# Patient Record
Sex: FEMALE | Race: WHITE | NOT HISPANIC OR LATINO | Employment: FULL TIME | URBAN - METROPOLITAN AREA
[De-identification: names, ages, dates, MRNs, and addresses within clinical notes are randomized per-mention and may not be internally consistent; named-entity substitution may affect disease eponyms.]

---

## 2017-05-11 ENCOUNTER — HOSPITAL ENCOUNTER (EMERGENCY)
Facility: HOSPITAL | Age: 62
Discharge: HOME | End: 2017-05-11
Payer: COMMERCIAL

## 2017-05-11 ENCOUNTER — APPOINTMENT (EMERGENCY)
Dept: RADIOLOGY | Facility: HOSPITAL | Age: 62
End: 2017-05-11
Payer: COMMERCIAL

## 2017-05-11 VITALS
DIASTOLIC BLOOD PRESSURE: 77 MMHG | SYSTOLIC BLOOD PRESSURE: 169 MMHG | BODY MASS INDEX: 44.9 KG/M2 | HEIGHT: 64 IN | OXYGEN SATURATION: 96 % | RESPIRATION RATE: 18 BRPM | TEMPERATURE: 98.1 F | HEART RATE: 65 BPM | WEIGHT: 263 LBS

## 2017-05-11 DIAGNOSIS — S37.009A KIDNEY INJURY: ICD-10-CM

## 2017-05-11 DIAGNOSIS — M54.9 MUSCULOSKELETAL BACK PAIN: Primary | ICD-10-CM

## 2017-05-11 LAB
ALBUMIN SERPL BCP-MCNC: 3.7 G/DL (ref 3.5–5)
ALP SERPL-CCNC: 151 U/L (ref 46–116)
ALT SERPL W P-5'-P-CCNC: 29 U/L (ref 12–78)
ANION GAP SERPL CALCULATED.3IONS-SCNC: 8 MMOL/L (ref 4–13)
ANION GAP SERPL CALCULATED.3IONS-SCNC: 8 MMOL/L (ref 4–13)
APTT PPP: 29 SECONDS (ref 23–35)
AST SERPL W P-5'-P-CCNC: 17 U/L (ref 5–45)
BASOPHILS # BLD AUTO: 0.03 THOUSANDS/ΜL (ref 0–0.1)
BASOPHILS NFR BLD AUTO: 0 % (ref 0–1)
BILIRUB SERPL-MCNC: 0.3 MG/DL (ref 0.2–1)
BUN SERPL-MCNC: 42 MG/DL (ref 5–25)
BUN SERPL-MCNC: 43 MG/DL (ref 5–25)
CALCIUM SERPL-MCNC: 8.7 MG/DL (ref 8.3–10.1)
CALCIUM SERPL-MCNC: 8.9 MG/DL (ref 8.3–10.1)
CHLORIDE SERPL-SCNC: 103 MMOL/L (ref 100–108)
CHLORIDE SERPL-SCNC: 107 MMOL/L (ref 100–108)
CO2 SERPL-SCNC: 25 MMOL/L (ref 21–32)
CO2 SERPL-SCNC: 27 MMOL/L (ref 21–32)
CREAT SERPL-MCNC: 1.49 MG/DL (ref 0.6–1.3)
CREAT SERPL-MCNC: 1.58 MG/DL (ref 0.6–1.3)
EOSINOPHIL # BLD AUTO: 0.17 THOUSAND/ΜL (ref 0–0.61)
EOSINOPHIL NFR BLD AUTO: 2 % (ref 0–6)
ERYTHROCYTE [DISTWIDTH] IN BLOOD BY AUTOMATED COUNT: 14 % (ref 11.6–15.1)
GFR SERPL CREATININE-BSD FRML MDRD: 33.2 ML/MIN/1.73SQ M
GFR SERPL CREATININE-BSD FRML MDRD: 35.6 ML/MIN/1.73SQ M
GLUCOSE SERPL-MCNC: 197 MG/DL (ref 65–140)
GLUCOSE SERPL-MCNC: 360 MG/DL (ref 65–140)
HCT VFR BLD AUTO: 35.9 % (ref 34.8–46.1)
HGB BLD-MCNC: 11.9 G/DL (ref 11.5–15.4)
LYMPHOCYTES # BLD AUTO: 1.95 THOUSANDS/ΜL (ref 0.6–4.47)
LYMPHOCYTES NFR BLD AUTO: 24 % (ref 14–44)
MCH RBC QN AUTO: 28.8 PG (ref 26.8–34.3)
MCHC RBC AUTO-ENTMCNC: 33.1 G/DL (ref 31.4–37.4)
MCV RBC AUTO: 87 FL (ref 82–98)
MONOCYTES # BLD AUTO: 0.56 THOUSAND/ΜL (ref 0.17–1.22)
MONOCYTES NFR BLD AUTO: 7 % (ref 4–12)
NEUTROPHILS # BLD AUTO: 5.53 THOUSANDS/ΜL (ref 1.85–7.62)
NEUTS SEG NFR BLD AUTO: 67 % (ref 43–75)
PLATELET # BLD AUTO: 262 THOUSANDS/UL (ref 149–390)
PMV BLD AUTO: 10.3 FL (ref 8.9–12.7)
POTASSIUM SERPL-SCNC: 5.1 MMOL/L (ref 3.5–5.3)
POTASSIUM SERPL-SCNC: 5.5 MMOL/L (ref 3.5–5.3)
PROT SERPL-MCNC: 8.1 G/DL (ref 6.4–8.2)
RBC # BLD AUTO: 4.13 MILLION/UL (ref 3.81–5.12)
SODIUM SERPL-SCNC: 138 MMOL/L (ref 136–145)
SODIUM SERPL-SCNC: 140 MMOL/L (ref 136–145)
TROPONIN I SERPL-MCNC: <0.02 NG/ML
TROPONIN I SERPL-MCNC: <0.02 NG/ML
WBC # BLD AUTO: 8.24 THOUSAND/UL (ref 4.31–10.16)

## 2017-05-11 PROCEDURE — 96374 THER/PROPH/DIAG INJ IV PUSH: CPT

## 2017-05-11 PROCEDURE — 99285 EMERGENCY DEPT VISIT HI MDM: CPT

## 2017-05-11 PROCEDURE — 84484 ASSAY OF TROPONIN QUANT: CPT | Performed by: PHYSICIAN ASSISTANT

## 2017-05-11 PROCEDURE — 80048 BASIC METABOLIC PNL TOTAL CA: CPT | Performed by: PHYSICIAN ASSISTANT

## 2017-05-11 PROCEDURE — 36415 COLL VENOUS BLD VENIPUNCTURE: CPT | Performed by: PHYSICIAN ASSISTANT

## 2017-05-11 PROCEDURE — 96361 HYDRATE IV INFUSION ADD-ON: CPT

## 2017-05-11 PROCEDURE — 80053 COMPREHEN METABOLIC PANEL: CPT | Performed by: PHYSICIAN ASSISTANT

## 2017-05-11 PROCEDURE — 93005 ELECTROCARDIOGRAM TRACING: CPT | Performed by: PHYSICIAN ASSISTANT

## 2017-05-11 PROCEDURE — 85025 COMPLETE CBC W/AUTO DIFF WBC: CPT | Performed by: PHYSICIAN ASSISTANT

## 2017-05-11 PROCEDURE — 85730 THROMBOPLASTIN TIME PARTIAL: CPT | Performed by: PHYSICIAN ASSISTANT

## 2017-05-11 PROCEDURE — 71020 HB CHEST X-RAY 2VW FRONTAL&LATL: CPT

## 2017-05-11 RX ORDER — TRAMADOL HYDROCHLORIDE 50 MG/1
50 TABLET ORAL EVERY 6 HOURS PRN
Qty: 20 TABLET | Refills: 0 | Status: SHIPPED | OUTPATIENT
Start: 2017-05-11 | End: 2017-05-16

## 2017-05-11 RX ORDER — CLOPIDOGREL BISULFATE 75 MG/1
75 TABLET ORAL DAILY
COMMUNITY

## 2017-05-11 RX ORDER — KETOROLAC TROMETHAMINE 30 MG/ML
30 INJECTION, SOLUTION INTRAMUSCULAR; INTRAVENOUS ONCE
Status: COMPLETED | OUTPATIENT
Start: 2017-05-11 | End: 2017-05-11

## 2017-05-11 RX ORDER — ASPIRIN 81 MG/1
81 TABLET ORAL DAILY
COMMUNITY

## 2017-05-11 RX ORDER — SPIRONOLACTONE 25 MG/1
12.5 TABLET ORAL DAILY
Status: ON HOLD | COMMUNITY
End: 2020-01-07 | Stop reason: ALTCHOICE

## 2017-05-11 RX ORDER — LISINOPRIL 20 MG/1
20 TABLET ORAL DAILY
Status: ON HOLD | COMMUNITY
End: 2020-01-07 | Stop reason: ALTCHOICE

## 2017-05-11 RX ORDER — ATORVASTATIN CALCIUM 40 MG/1
40 TABLET, FILM COATED ORAL DAILY
COMMUNITY

## 2017-05-11 RX ORDER — METOPROLOL SUCCINATE 25 MG/1
75 TABLET, EXTENDED RELEASE ORAL DAILY
Status: ON HOLD | COMMUNITY
End: 2020-01-07 | Stop reason: ALTCHOICE

## 2017-05-11 RX ADMIN — SODIUM CHLORIDE 500 ML: 0.9 INJECTION, SOLUTION INTRAVENOUS at 17:40

## 2017-05-11 RX ADMIN — KETOROLAC TROMETHAMINE 30 MG: 30 INJECTION, SOLUTION INTRAMUSCULAR at 17:40

## 2017-05-12 LAB
ATRIAL RATE: 72 BPM
P AXIS: 24 DEGREES
PR INTERVAL: 126 MS
QRS AXIS: -17 DEGREES
QRSD INTERVAL: 88 MS
QT INTERVAL: 396 MS
QTC INTERVAL: 433 MS
T WAVE AXIS: 86 DEGREES
VENTRICULAR RATE: 72 BPM

## 2017-08-28 ENCOUNTER — APPOINTMENT (EMERGENCY)
Dept: RADIOLOGY | Facility: HOSPITAL | Age: 62
End: 2017-08-28
Attending: EMERGENCY MEDICINE
Payer: COMMERCIAL

## 2017-08-28 ENCOUNTER — HOSPITAL ENCOUNTER (EMERGENCY)
Facility: HOSPITAL | Age: 62
Discharge: HOME | End: 2017-08-28
Attending: EMERGENCY MEDICINE | Admitting: EMERGENCY MEDICINE
Payer: COMMERCIAL

## 2017-08-28 VITALS
HEART RATE: 74 BPM | SYSTOLIC BLOOD PRESSURE: 198 MMHG | HEIGHT: 64 IN | BODY MASS INDEX: 42.68 KG/M2 | DIASTOLIC BLOOD PRESSURE: 93 MMHG | RESPIRATION RATE: 17 BRPM | OXYGEN SATURATION: 98 % | WEIGHT: 250 LBS | TEMPERATURE: 97.8 F

## 2017-08-28 DIAGNOSIS — S92.002A CALCANEUS FRACTURE, LEFT: Primary | ICD-10-CM

## 2017-08-28 PROCEDURE — 73610 X-RAY EXAM OF ANKLE: CPT

## 2017-08-28 PROCEDURE — 99283 EMERGENCY DEPT VISIT LOW MDM: CPT

## 2017-08-28 PROCEDURE — 73630 X-RAY EXAM OF FOOT: CPT

## 2017-08-28 RX ORDER — HYDROCODONE BITARTRATE AND ACETAMINOPHEN 5; 325 MG/1; MG/1
1 TABLET ORAL EVERY 6 HOURS PRN
Qty: 15 TABLET | Refills: 0 | Status: SHIPPED | OUTPATIENT
Start: 2017-08-28 | End: 2017-09-07

## 2017-08-28 RX ORDER — INSULIN GLARGINE 100 [IU]/ML
36 INJECTION, SOLUTION SUBCUTANEOUS
COMMUNITY
End: 2020-02-21 | Stop reason: ALTCHOICE

## 2017-08-31 ENCOUNTER — ALLSCRIPTS OFFICE VISIT (OUTPATIENT)
Dept: OTHER | Facility: OTHER | Age: 62
End: 2017-08-31

## 2017-09-11 DIAGNOSIS — S92.002A CLOSED FRACTURE OF LEFT CALCANEUS: ICD-10-CM

## 2017-09-14 ENCOUNTER — GENERIC CONVERSION - ENCOUNTER (OUTPATIENT)
Dept: OTHER | Facility: OTHER | Age: 62
End: 2017-09-14

## 2017-09-14 ENCOUNTER — APPOINTMENT (OUTPATIENT)
Dept: RADIOLOGY | Facility: CLINIC | Age: 62
End: 2017-09-14
Payer: COMMERCIAL

## 2017-09-14 DIAGNOSIS — S92.002A CLOSED FRACTURE OF LEFT CALCANEUS: ICD-10-CM

## 2017-09-14 PROCEDURE — 73650 X-RAY EXAM OF HEEL: CPT

## 2017-10-16 DIAGNOSIS — S92.002D: ICD-10-CM

## 2017-10-17 ENCOUNTER — APPOINTMENT (OUTPATIENT)
Dept: RADIOLOGY | Facility: CLINIC | Age: 62
End: 2017-10-17
Payer: COMMERCIAL

## 2017-10-17 ENCOUNTER — ALLSCRIPTS OFFICE VISIT (OUTPATIENT)
Dept: OTHER | Facility: OTHER | Age: 62
End: 2017-10-17

## 2017-10-17 DIAGNOSIS — S92.002D: ICD-10-CM

## 2017-10-17 PROCEDURE — 73650 X-RAY EXAM OF HEEL: CPT

## 2017-10-18 NOTE — PROGRESS NOTES
Assessment  Assessed    1  Closed nondisplaced fracture of left calcaneus with routine healing, unspecified portion   of calcaneus, subsequent encounter (V54 19) (S90 002D)    Plan  Closed nondisplaced fracture of left calcaneus with routine healing, unspecified portion  of calcaneus, subsequent encounter    · * XR HEEL / CALCANEUS 2+ VIEW LEFT; Status:Active - Retrospective By Protocol  Authorization; Requested for:[id number];    · Follow-up visit in 6 weeks Evaluation and Treatment  Follow-up  Status: Hold For -  Scheduling  Requested for: 17GAR0482   · Weight bearing as tolerated ; Status:Complete;   Done: 70VLU2778 08:54AM    Discussion/Summary    41-year-old female with a left calcaneus fracture  She will begin weightbearing as tolerated with the cam on  I will see her back in 6 weeks with x-rays of the left calcaneus  Chief Complaint  Broken heel      History of Present Illness  HPI: 41-year-old female here today for follow-up of her left heel  On August 28, 2017 she was walking along the flat driveway when she felt and heard a pop and had exquisite pain and was found to have a left calcaneus fracture  She's been nonweightbearing in a cam boot  The most part she's been fairly compliant with that  Her pains been well controlled  Review of Systems    Constitutional: No fever, no chills, feels well, no tiredness, no recent weight gain or loss  Eyes: No complaints of eyesight problems, no red eyes  ENT: no loss of hearing, no nosebleeds, no sore throat  Cardiovascular: No complaints of chest pain, no palpitations, no leg claudication or lower extremity edema  Respiratory: no compliants of shortness of breath, no wheezing, no cough  Gastrointestinal: no complaints of abdominal pain, no constipation, no nausea or diarrhea, no vomiting, no bloody stools  Genitourinary: no complaints of dysuria, no incontinence  Musculoskeletal: limb pain, but-- as noted in HPI     Integumentary: no complaints of skin rash or lesion, no itching or dry skin, no skin wounds  Neurological: no complaints of headache, no confusion, no numbness or tingling, no dizziness  Endocrine: No complaints of muscle weakness, no feelings of weakness, no frequent urination, no excessive thirst    Psychiatric: No suicidal thoughts, no anxiety, no feelings of depression  Active Problems  Problems    1  Closed nondisplaced fracture of left calcaneus with routine healing, unspecified portion   of calcaneus, subsequent encounter (V54 19) (S92 002D)   2  Diabetes (250 00) (E11 9)   3  Heart disorder (429 9) (I51 9)   4  High blood pressure (401 9) (I10)    Surgical History  Problems    · History of Cath Stent Placement   · History of  Section    Family History  Family History    · Family history of arthritis (V17 7) (Z82 61)    Social History  Problems    · Drinks coffee   · Never a smoker   · Patient ingests cola containing caffeine (V49 89) (Z78 9)   · Social drinker (V49 89) (Z78 9)   · Tea    Current Meds   1  Atorvastatin Calcium 40 MG Oral Tablet; Therapy: (Recorded:48Fya5359) to Recorded   2  Clarithromycin 500 MG Oral Tablet; Therapy: 18ZCK0133 to (Last Elijah Delia)  Requested for: 50Pzm5614 Ordered   3  Lisinopril 20 MG Oral Tablet; Therapy: 50NCC4574 to (Last [facility])  Requested for: 23TMA8109 Ordered   4  Metoprolol Succinate ER 50 MG Oral Tablet Extended Release 24 Hour; Therapy: (Recorded:72Jna6200) to Recorded   5  Plavix 75 MG Oral Tablet; Therapy: (Recorded:19Hyd0835) to Recorded   6  Promethazine-Codeine 6 25-10 MG/5ML Oral Syrup; Therapy: 18OPN0215 to (Last Rx:45Yvo6479)  Requested for: 61Afw0809 Ordered   7  Spironolactone 25 MG Oral Tablet; Therapy: (Recorded:14Xra1335) to Recorded    Allergies  Medication    1  morphine   2   Penicillins    Vitals  Signs   Recorded: 60ZUU5938 08:19AM   Heart Rate: 74  Systolic: 868  Diastolic: 90  Recorded: 31CDB5811 08:17AM   Height: 5 ft 4 in    Physical Exam  Left foot: Full range of motion, no tenderness to palpation, mild swelling      Results/Data  I personally reviewed the films/images/results in the office today  My interpretation follows  X-ray Review X-rays show no further displacement and interval healing at the fracture site        Signatures   Electronically signed by : Bishop Cabrera MD; Oct 17 2017  8:54AM EST                       (Author)

## 2017-11-28 ENCOUNTER — ALLSCRIPTS OFFICE VISIT (OUTPATIENT)
Dept: OTHER | Facility: OTHER | Age: 62
End: 2017-11-28

## 2017-11-28 ENCOUNTER — APPOINTMENT (OUTPATIENT)
Dept: RADIOLOGY | Facility: CLINIC | Age: 62
End: 2017-11-28
Payer: COMMERCIAL

## 2017-11-28 DIAGNOSIS — S92.002D: ICD-10-CM

## 2017-11-28 PROCEDURE — 73650 X-RAY EXAM OF HEEL: CPT

## 2017-11-29 NOTE — PROGRESS NOTES
Assessment  Assessed    1  History of Closed nondisplaced fracture of left calcaneus with routine healing, unspecified portion of calcaneus, subsequent encounter (V54 19) (S92 002D)    Plan  PMH: Closed nondisplaced fracture of left calcaneus with routine healing, unspecifiedportion of calcaneus, subsequent encounter    · * XR HEEL / CALCANEUS 2+ VIEW LEFT; Status:Active - Retrospective By ProtocolAuthorization; Requested for:28Nov2017;    · Follow-up PRN Evaluation and Treatment  Follow-up  Status: Complete  Done:28Nov2017   · We recommend that you move your ankle through its full range of motion each day  [place] exercise 10 times in a row, 3 times a day ; Status:Complete;   Done: 56WWK4487   · We recommend that you use tubing to strengthen the muscles that move your foot andankle out to the side  Do this exercise 10 times in a row, 3 times a day ; Status:Complete;  Done: 66CUF3322   · We recommend that you use tubing to strengthen the muscles that turn your ankle in  [place] exercise 10 times in a row, 3 times a day ; Status:Complete;   Done: 69GOH9451   · *1 - [facility]  * heel calcaneus fracture  Evaluate and treatwith strengthening, range of motion, and proprioceptive training  No restrictions  Weanfrom Cam boot as tolerated  Status: Complete  Done: 43XWD0716  Care Summary provided  : Yes    Discussion/Summary    61-year-old female with a left calcaneus fracture, now healed  She may wean from the Cam boot  I have strongly recommended physical therapy  She will follow up as needed  Chief Complaint  Broken heel      History of Present Illness  HPI: 61-year-old female here today for follow-up of her left heel  On August 28, 2017 she was walking along the flat driveway when she felt and heard a pop and had exquisite pain and was found to have a left calcaneus fracture  She's been weightbearing in a cam boot  She feels like she is doing very well  She has no pain        Review of Systems   Constitutional: No fever, no chills, feels well, no tiredness, no recent weight gain or loss  Eyes: No complaints of eyesight problems, no red eyes  ENT: no loss of hearing, no nosebleeds, no sore throat  Cardiovascular: No complaints of chest pain, no palpitations, no leg claudication or lower extremity edema  Respiratory: no compliants of shortness of breath, no wheezing, no cough  Gastrointestinal: no complaints of abdominal pain, no constipation, no nausea or diarrhea, no vomiting, no bloody stools  Genitourinary: no complaints of dysuria, no incontinence  Musculoskeletal: as noted in HPI  Integumentary: no complaints of skin rash or lesion, no itching or dry skin, no skin wounds  Neurological: no complaints of headache, no confusion, no numbness or tingling, no dizziness  Endocrine: No complaints of muscle weakness, no feelings of weakness, no frequent urination, no excessive thirst   Psychiatric: No suicidal thoughts, no anxiety, no feelings of depression  ROS reviewed  Active Problems  Problems    1  Diabetes (250 00) (E11 9)   2  Heart disorder (429 9) (I51 9)   3  High blood pressure (401 9) (I10)    Past Medical History  Problems    · History of Closed nondisplaced fracture of left calcaneus with routine healing,unspecified portion of calcaneus, subsequent encounter (V54 19) (S92 002D)    Surgical History  Problems    · History of Cath Stent Placement   · History of  Section    Family History  Family History    · Family history of arthritis (V17 7) (Z82 61)    Social History  Problems    · Drinks coffee   · Never a smoker   · Patient ingests cola containing caffeine (V49 89) (Z78 9)   · Social drinker (V49 89) (Z78 9)   · Tea    Current Meds   1  Atorvastatin Calcium 40 MG Oral Tablet; Therapy: (Recorded:01Qzo2322) to Recorded   2  Clarithromycin 500 MG Oral Tablet; Therapy: 09UFA4177 to (Last Sharyon Clause)  Requested for: 05Gsq8864 Ordered   3   Lisinopril 20 MG Oral Tablet; Therapy: 54ZUB4470 to (Last Christine Crabtree)  Requested for: 00HHZ4592 Ordered   4  Metoprolol Succinate ER 50 MG Oral Tablet Extended Release 24 Hour; Therapy: (Recorded:85Yns2516) to Recorded   5  Plavix 75 MG Oral Tablet; Therapy: (Recorded:78Shk0111) to Recorded   6  Promethazine-Codeine 6 25-10 MG/5ML Oral Syrup; Therapy: 33OJB9762 to (Last Rx:20Lhe9075)  Requested for: 96Qmk8234 Ordered   7  Spironolactone 25 MG Oral Tablet; Therapy: (Recorded:31Zxj9847) to Recorded    Allergies  Medication    1  morphine   2  Penicillins    Vitals  Signs   Recorded: 28Nov2017 08:31AM   Heart Rate: 66  Systolic: 007  Diastolic: 92  Height: 5 ft 4 in  Weight: 263 lb   BMI Calculated: 45 14  BSA Calculated: 2 2    Physical Exam    Right Foot: Appearance: no swelling  Tenderness: None  ROM: subtalar motion was restricted   Results/Data  I personally reviewed the films/images/results in the office today  My interpretation follows  X-ray Review X-rays show healing of the left calcaneus fracture with excellent alignment        Signatures   Electronically signed by : William West MD; Nov 28 2017  9:13AM EST                       (Author)

## 2018-01-13 VITALS
SYSTOLIC BLOOD PRESSURE: 177 MMHG | HEIGHT: 64 IN | DIASTOLIC BLOOD PRESSURE: 92 MMHG | HEART RATE: 66 BPM | WEIGHT: 263 LBS | BODY MASS INDEX: 44.9 KG/M2

## 2018-01-13 VITALS — HEIGHT: 64 IN | SYSTOLIC BLOOD PRESSURE: 149 MMHG | HEART RATE: 67 BPM | DIASTOLIC BLOOD PRESSURE: 81 MMHG

## 2018-01-15 VITALS — DIASTOLIC BLOOD PRESSURE: 90 MMHG | SYSTOLIC BLOOD PRESSURE: 158 MMHG | HEIGHT: 64 IN | HEART RATE: 74 BPM

## 2018-01-22 VITALS — HEIGHT: 64 IN

## 2018-01-22 VITALS — SYSTOLIC BLOOD PRESSURE: 150 MMHG | HEART RATE: 71 BPM | DIASTOLIC BLOOD PRESSURE: 80 MMHG

## 2020-01-07 ENCOUNTER — APPOINTMENT (OUTPATIENT)
Dept: CT IMAGING | Facility: HOSPITAL | Age: 65
End: 2020-01-07
Payer: COMMERCIAL

## 2020-01-07 ENCOUNTER — APPOINTMENT (EMERGENCY)
Dept: RADIOLOGY | Facility: HOSPITAL | Age: 65
End: 2020-01-07
Payer: COMMERCIAL

## 2020-01-07 ENCOUNTER — HOSPITAL ENCOUNTER (OUTPATIENT)
Facility: HOSPITAL | Age: 65
Setting detail: OBSERVATION
Discharge: HOME | End: 2020-01-08
Attending: EMERGENCY MEDICINE | Admitting: FAMILY MEDICINE
Payer: COMMERCIAL

## 2020-01-07 DIAGNOSIS — R55 SYNCOPE AND COLLAPSE: ICD-10-CM

## 2020-01-07 DIAGNOSIS — R55 SYNCOPE: Primary | ICD-10-CM

## 2020-01-07 DIAGNOSIS — E11.9 DIABETES MELLITUS (HCC): ICD-10-CM

## 2020-01-07 DIAGNOSIS — L03.119 CELLULITIS OF LOWER EXTREMITY, UNSPECIFIED LATERALITY: ICD-10-CM

## 2020-01-07 PROBLEM — I25.10 CORONARY ARTERY DISEASE: Status: ACTIVE | Noted: 2020-01-07

## 2020-01-07 PROBLEM — I50.9 CHF (CONGESTIVE HEART FAILURE) (HCC): Status: ACTIVE | Noted: 2020-01-07

## 2020-01-07 PROBLEM — I67.1 CEREBRAL ANEURYSM WITHOUT RUPTURE: Status: ACTIVE | Noted: 2019-09-27

## 2020-01-07 PROBLEM — N18.4 STAGE 4 CHRONIC KIDNEY DISEASE (HCC): Status: ACTIVE | Noted: 2018-08-13

## 2020-01-07 PROBLEM — I10 HYPERTENSION: Status: ACTIVE | Noted: 2020-01-07

## 2020-01-07 LAB
ALBUMIN SERPL BCP-MCNC: 3.2 G/DL (ref 3.5–5)
ALP SERPL-CCNC: 97 U/L (ref 46–116)
ALT SERPL W P-5'-P-CCNC: 19 U/L (ref 12–78)
ANION GAP SERPL CALCULATED.3IONS-SCNC: 12 MMOL/L (ref 4–13)
AST SERPL W P-5'-P-CCNC: 14 U/L (ref 5–45)
BASOPHILS # BLD AUTO: 0.04 THOUSANDS/ΜL (ref 0–0.1)
BASOPHILS NFR BLD AUTO: 1 % (ref 0–1)
BILIRUB SERPL-MCNC: 0.5 MG/DL (ref 0.2–1)
BUN SERPL-MCNC: 79 MG/DL (ref 5–25)
CALCIUM SERPL-MCNC: 8.3 MG/DL (ref 8.3–10.1)
CHLORIDE SERPL-SCNC: 111 MMOL/L (ref 100–108)
CLARITY, POC: CLEAR
CO2 SERPL-SCNC: 21 MMOL/L (ref 21–32)
COLOR, POC: CLEAR
CREAT SERPL-MCNC: 2.15 MG/DL (ref 0.6–1.3)
EOSINOPHIL # BLD AUTO: 0.75 THOUSAND/ΜL (ref 0–0.61)
EOSINOPHIL NFR BLD AUTO: 10 % (ref 0–6)
ERYTHROCYTE [DISTWIDTH] IN BLOOD BY AUTOMATED COUNT: 15.7 % (ref 11.6–15.1)
EXT BILIRUBIN, UA: NEGATIVE
EXT BLOOD URINE: NEGATIVE
EXT GLUCOSE, UA: NEGATIVE
EXT KETONES: NEGATIVE
EXT NITRITE, UA: NEGATIVE
EXT PH, UA: 6
EXT PROTEIN, UA: NEGATIVE
EXT SPECIFIC GRAVITY, UA: 1.01
EXT UROBILINOGEN: 0.2
GFR SERPL CREATININE-BSD FRML MDRD: 24 ML/MIN/1.73SQ M
GLUCOSE SERPL-MCNC: 166 MG/DL (ref 65–140)
GLUCOSE SERPL-MCNC: 214 MG/DL (ref 65–140)
HCT VFR BLD AUTO: 29.5 % (ref 34.8–46.1)
HGB BLD-MCNC: 9.1 G/DL (ref 11.5–15.4)
IMM GRANULOCYTES # BLD AUTO: 0.01 THOUSAND/UL (ref 0–0.2)
IMM GRANULOCYTES NFR BLD AUTO: 0 % (ref 0–2)
LYMPHOCYTES # BLD AUTO: 0.73 THOUSANDS/ΜL (ref 0.6–4.47)
LYMPHOCYTES NFR BLD AUTO: 10 % (ref 14–44)
MCH RBC QN AUTO: 28.2 PG (ref 26.8–34.3)
MCHC RBC AUTO-ENTMCNC: 30.8 G/DL (ref 31.4–37.4)
MCV RBC AUTO: 91 FL (ref 82–98)
MONOCYTES # BLD AUTO: 0.45 THOUSAND/ΜL (ref 0.17–1.22)
MONOCYTES NFR BLD AUTO: 6 % (ref 4–12)
NEUTROPHILS # BLD AUTO: 5.29 THOUSANDS/ΜL (ref 1.85–7.62)
NEUTS SEG NFR BLD AUTO: 73 % (ref 43–75)
NRBC BLD AUTO-RTO: 0 /100 WBCS
PLATELET # BLD AUTO: 184 THOUSANDS/UL (ref 149–390)
PMV BLD AUTO: 11.1 FL (ref 8.9–12.7)
POTASSIUM SERPL-SCNC: 4.8 MMOL/L (ref 3.5–5.3)
PROT SERPL-MCNC: 7 G/DL (ref 6.4–8.2)
RBC # BLD AUTO: 3.23 MILLION/UL (ref 3.81–5.12)
SODIUM SERPL-SCNC: 144 MMOL/L (ref 136–145)
TROPONIN I SERPL-MCNC: 0.02 NG/ML
WBC # BLD AUTO: 7.27 THOUSAND/UL (ref 4.31–10.16)
WBC # BLD EST: NEGATIVE 10*3/UL

## 2020-01-07 PROCEDURE — 85025 COMPLETE CBC W/AUTO DIFF WBC: CPT | Performed by: EMERGENCY MEDICINE

## 2020-01-07 PROCEDURE — 70450 CT HEAD/BRAIN W/O DYE: CPT

## 2020-01-07 PROCEDURE — 81002 URINALYSIS NONAUTO W/O SCOPE: CPT | Performed by: EMERGENCY MEDICINE

## 2020-01-07 PROCEDURE — 99285 EMERGENCY DEPT VISIT HI MDM: CPT | Performed by: EMERGENCY MEDICINE

## 2020-01-07 PROCEDURE — 84484 ASSAY OF TROPONIN QUANT: CPT | Performed by: EMERGENCY MEDICINE

## 2020-01-07 PROCEDURE — 83036 HEMOGLOBIN GLYCOSYLATED A1C: CPT | Performed by: FAMILY MEDICINE

## 2020-01-07 PROCEDURE — 36415 COLL VENOUS BLD VENIPUNCTURE: CPT

## 2020-01-07 PROCEDURE — 80053 COMPREHEN METABOLIC PANEL: CPT | Performed by: EMERGENCY MEDICINE

## 2020-01-07 PROCEDURE — 71046 X-RAY EXAM CHEST 2 VIEWS: CPT

## 2020-01-07 PROCEDURE — 82948 REAGENT STRIP/BLOOD GLUCOSE: CPT

## 2020-01-07 PROCEDURE — 99285 EMERGENCY DEPT VISIT HI MDM: CPT

## 2020-01-07 PROCEDURE — 93005 ELECTROCARDIOGRAM TRACING: CPT

## 2020-01-07 PROCEDURE — 99218 PR INITIAL OBSERVATION CARE/DAY 30 MINUTES: CPT | Performed by: FAMILY MEDICINE

## 2020-01-07 RX ORDER — SPIRONOLACTONE 25 MG/1
12.5 TABLET ORAL DAILY
Status: DISCONTINUED | OUTPATIENT
Start: 2020-01-08 | End: 2020-01-07

## 2020-01-07 RX ORDER — CLOPIDOGREL BISULFATE 75 MG/1
75 TABLET ORAL DAILY
Status: DISCONTINUED | OUTPATIENT
Start: 2020-01-08 | End: 2020-01-08 | Stop reason: HOSPADM

## 2020-01-07 RX ORDER — HYDRALAZINE HYDROCHLORIDE 25 MG/1
25 TABLET, FILM COATED ORAL 3 TIMES DAILY
Status: ON HOLD | COMMUNITY
End: 2021-03-15 | Stop reason: CLARIF

## 2020-01-07 RX ORDER — CARVEDILOL 12.5 MG/1
25 TABLET ORAL 2 TIMES DAILY WITH MEALS
Status: DISCONTINUED | OUTPATIENT
Start: 2020-01-07 | End: 2020-01-08 | Stop reason: HOSPADM

## 2020-01-07 RX ORDER — TORSEMIDE 20 MG/1
40 TABLET ORAL DAILY
Status: DISCONTINUED | OUTPATIENT
Start: 2020-01-08 | End: 2020-01-08 | Stop reason: HOSPADM

## 2020-01-07 RX ORDER — HYDRALAZINE HYDROCHLORIDE 25 MG/1
75 TABLET, FILM COATED ORAL 3 TIMES DAILY
Status: DISCONTINUED | OUTPATIENT
Start: 2020-01-07 | End: 2020-01-08 | Stop reason: HOSPADM

## 2020-01-07 RX ORDER — ATORVASTATIN CALCIUM 40 MG/1
40 TABLET, FILM COATED ORAL DAILY
Status: DISCONTINUED | OUTPATIENT
Start: 2020-01-07 | End: 2020-01-08 | Stop reason: HOSPADM

## 2020-01-07 RX ORDER — ASPIRIN 81 MG/1
81 TABLET ORAL DAILY
Status: DISCONTINUED | OUTPATIENT
Start: 2020-01-08 | End: 2020-01-08 | Stop reason: HOSPADM

## 2020-01-07 RX ORDER — CARVEDILOL 25 MG/1
25 TABLET ORAL 2 TIMES DAILY
COMMUNITY
Start: 2019-03-18 | End: 2021-04-05 | Stop reason: HOSPADM

## 2020-01-07 RX ORDER — CEPHALEXIN 250 MG/1
250 CAPSULE ORAL EVERY 12 HOURS SCHEDULED
Status: DISCONTINUED | OUTPATIENT
Start: 2020-01-07 | End: 2020-01-08 | Stop reason: HOSPADM

## 2020-01-07 RX ORDER — INSULIN GLARGINE 100 [IU]/ML
36 INJECTION, SOLUTION SUBCUTANEOUS
Status: DISCONTINUED | OUTPATIENT
Start: 2020-01-07 | End: 2020-01-08 | Stop reason: HOSPADM

## 2020-01-07 RX ORDER — AMLODIPINE BESYLATE 2.5 MG/1
2.5 TABLET ORAL DAILY
COMMUNITY
End: 2021-07-19 | Stop reason: ALTCHOICE

## 2020-01-07 RX ORDER — HYDRALAZINE HYDROCHLORIDE 25 MG/1
25 TABLET, FILM COATED ORAL 3 TIMES DAILY
Status: DISCONTINUED | OUTPATIENT
Start: 2020-01-07 | End: 2020-01-07

## 2020-01-07 RX ORDER — TORSEMIDE 20 MG/1
40 TABLET ORAL DAILY
COMMUNITY
End: 2021-04-05 | Stop reason: HOSPADM

## 2020-01-07 RX ORDER — HYDRALAZINE HYDROCHLORIDE 50 MG/1
75 TABLET, FILM COATED ORAL 2 TIMES DAILY
COMMUNITY
End: 2021-04-05 | Stop reason: HOSPADM

## 2020-01-07 RX ORDER — AMLODIPINE BESYLATE 2.5 MG/1
2.5 TABLET ORAL DAILY
Status: DISCONTINUED | OUTPATIENT
Start: 2020-01-08 | End: 2020-01-08 | Stop reason: HOSPADM

## 2020-01-07 RX ORDER — LISINOPRIL 20 MG/1
20 TABLET ORAL DAILY
Status: DISCONTINUED | OUTPATIENT
Start: 2020-01-08 | End: 2020-01-07

## 2020-01-07 RX ORDER — NITROGLYCERIN 0.4 MG/1
0.4 TABLET SUBLINGUAL
COMMUNITY
Start: 2018-11-05

## 2020-01-07 RX ORDER — ATORVASTATIN CALCIUM 40 MG/1
40 TABLET, FILM COATED ORAL DAILY
Status: DISCONTINUED | OUTPATIENT
Start: 2020-01-08 | End: 2020-01-07

## 2020-01-07 RX ADMIN — CEPHALEXIN 250 MG: 250 CAPSULE ORAL at 23:09

## 2020-01-07 RX ADMIN — CARVEDILOL 25 MG: 12.5 TABLET, FILM COATED ORAL at 18:30

## 2020-01-07 RX ADMIN — HYDRALAZINE HYDROCHLORIDE 75 MG: 25 TABLET ORAL at 23:09

## 2020-01-07 RX ADMIN — ATORVASTATIN CALCIUM 40 MG: 40 TABLET, FILM COATED ORAL at 18:29

## 2020-01-07 NOTE — ED PROVIDER NOTES
History  Chief Complaint   Patient presents with    Syncope     pt presents to ER stating she woke up to get ready for work, got out of bed and passed out  reports nausea and neck pain     64y F here for evaluation after syncopal episodes  Was up getting ready for work  Had some nausea earlier in the am but none immediately prior to the incident  Was walking to the bathroom and then suddenly 'woke up on the floor'  Denies any preceding symptoms of lh, cp/pressure, sob/dillard, nausea, abd pain/cramping, numbness  No hx of sudden syncope in the past   Does have a sig cardiac hx w/ known cad, chf      Denies recent illness  No f/c/s, no sig cough/congestion  Does note a poor appetite w/ some mild nausea earlier today, none now  No v/d, no changes in urination  No sig le pain or swelling  No recent changes in her meds and reports med compliance  History provided by:  Patient and relative  Syncope   Episode history:  Single  Most recent episode: Today  Timing:  Unable to specify  Progression:  Resolved  Chronicity:  New  Context: normal activity    Witnessed: no    Relieved by:  Nothing  Worsened by:  Nothing  Ineffective treatments:  None tried  Associated symptoms: no anxiety, no chest pain, no diaphoresis, no difficulty breathing, no dizziness, no fever, no focal weakness, no headaches, no malaise/fatigue, no palpitations, no recent fall, no recent injury, no recent surgery, no rectal bleeding, no shortness of breath, no vomiting and no weakness    Risk factors: coronary artery disease        Prior to Admission Medications   Prescriptions Last Dose Informant Patient Reported? Taking?    amLODIPine (NORVASC) 2 5 mg tablet 1/6/2020 at Unknown time  Yes Yes   Sig: Take 2 5 mg by mouth daily   aspirin (ECOTRIN LOW STRENGTH) 81 mg EC tablet 1/7/2020 at Unknown time Self Yes Yes   Sig: Take 81 mg by mouth daily   atorvastatin (LIPITOR) 40 mg tablet 1/6/2020 at Unknown time Self Yes Yes   Sig: Take 40 mg by mouth daily   carvedilol (COREG) 25 mg tablet 2020 at Unknown time  Yes Yes   Sig: Take 25 mg by mouth 2 (two) times a day    clopidogrel (PLAVIX) 75 mg tablet 2020 at Unknown time Self Yes Yes   Sig: Take 75 mg by mouth daily   hydrALAZINE (APRESOLINE) 25 mg tablet   Yes Yes   Sig: Take 25 mg by mouth 3 (three) times a day   hydrALAZINE (APRESOLINE) 50 mg tablet   Yes Yes   Sig: Take by mouth 3 (three) times a day   insulin aspart (NovoLOG) 100 units/mL injection 2020 at Unknown time  Yes Yes   Sig: Inject 14 Units under the skin 3 (three) times a day before meals   insulin glargine (LANTUS) 100 units/mL subcutaneous injection   Yes No   Sig: Inject 36 Units under the skin daily at bedtime    nitroglycerin (NITROSTAT) 0 4 mg SL tablet Not Taking at Unknown time  Yes No   Sig: Place 0 4 mg under the tongue   torsemide (DEMADEX) 20 mg tablet 2020 at Unknown time  Yes Yes   Sig: Take 40 mg by mouth daily      Facility-Administered Medications: None       Past Medical History:   Diagnosis Date    Cardiac disease     [facility]     Coronary artery disease     Diabetes mellitus [facility]     Hyperlipidemia     Hypertension     Renal disorder     TIA (transient ischemic attack)        Past Surgical History:   Procedure Laterality Date    CARDIAC SURGERY       SECTION      CORONARY ANGIOPLASTY WITH STENT PLACEMENT         History reviewed  No pertinent family history  I have reviewed and agree with the history as documented  Social History     Tobacco Use    Smoking status: Former Smoker     Years: 40 00    Smokeless tobacco: Never Used    Tobacco comment: quit    Substance Use Topics    Alcohol use: Never     Frequency: Never     Binge frequency: Never     Comment: socially    Drug use: No        Review of Systems   Constitutional: Negative for diaphoresis, fever and malaise/fatigue  Respiratory: Negative for shortness of breath      Cardiovascular: Positive for syncope  Negative for chest pain and palpitations  Gastrointestinal: Negative for vomiting  Neurological: Negative for dizziness, focal weakness, weakness and headaches  All other systems reviewed and are negative  Physical Exam  Physical Exam   Constitutional: She is oriented to person, place, and time  She appears well-developed  Morbidly obese w/ bm 44 27    HENT:   Nose: Nose normal    Mouth/Throat: Oropharynx is clear and moist    Eyes: Pupils are equal, round, and reactive to light  EOM are normal    Neck: Neck supple  No JVD present  Cardiovascular: Normal rate and regular rhythm  Murmur heard  Pulmonary/Chest: Effort normal and breath sounds normal    Abdominal: Soft  There is no tenderness  Musculoskeletal: She exhibits edema (trace)  She exhibits no deformity  Neurological: She is alert and oriented to person, place, and time  She has normal strength  No cranial nerve deficit or sensory deficit  Coordination normal  GCS eye subscore is 4  GCS verbal subscore is 5  GCS motor subscore is 6  Skin: Skin is warm  There is pallor  Psychiatric: She has a normal mood and affect  Nursing note and vitals reviewed        Vital Signs  ED Triage Vitals   Temperature Pulse Respirations Blood Pressure SpO2   01/07/20 1136 01/07/20 1136 01/07/20 1136 01/07/20 1137 01/07/20 1136   (!) 97 2 °F (36 2 °C) 66 19 166/68 96 %      Temp src Heart Rate Source Patient Position - Orthostatic VS BP Location FiO2 (%)   -- 01/07/20 1136 -- -- --    Monitor         Pain Score       01/07/20 1136       7           Vitals:    01/07/20 1538 01/07/20 2309 01/07/20 2314 01/08/20 0740   BP: 142/62 162/74  157/67   Pulse: 66  69 66         Visual Acuity      ED Medications  Medications   amLODIPine (NORVASC) tablet 2 5 mg (2 5 mg Oral Given 1/8/20 0939)   aspirin (ECOTRIN LOW STRENGTH) EC tablet 81 mg (81 mg Oral Given 1/8/20 0939)   clopidogrel (PLAVIX) tablet 75 mg (75 mg Oral Given 1/8/20 0939) torsemide (DEMADEX) tablet 40 mg (40 mg Oral Given 1/8/20 0939)   carvedilol (COREG) tablet 25 mg (25 mg Oral Given 1/8/20 0943)   cephalexin (KEFLEX) capsule 250 mg (250 mg Oral Given 1/8/20 0939)   insulin glargine (LANTUS) subcutaneous injection 36 Units 0 36 mL (36 Units Subcutaneous Refused 1/7/20 2309)   atorvastatin (LIPITOR) tablet 40 mg (40 mg Oral Given 1/8/20 0939)   hydrALAZINE (APRESOLINE) tablet 75 mg (75 mg Oral Given 1/8/20 0939)   insulin lispro (HumaLOG) 100 units/mL subcutaneous injection 14 Units (14 Units Subcutaneous Given 1/8/20 0940)       Diagnostic Studies  Results Reviewed     Procedure Component Value Units Date/Time    Hemoglobin A1C w/ EAG Estimation [[id number]]  (Abnormal) Collected:  01/07/20 1243    Lab Status:  Final result Specimen:  Blood from Arm, Right Updated:  01/08/20 0547     Hemoglobin A1C 7 4 %       mg/dl     POCT urinalysis dipstick [[id number]]  (Normal) Resulted:  01/07/20 1433    Lab Status:  Final result Specimen:  Urine Updated:  01/07/20 1435     Color, UA clear     Clarity, UA clear     Glucose, UA (Ref: Negative) negative     Bilirubin, UA (Ref: Negative) negative     Ketones, UA (Ref: Negative) negative     Spec Grav, UA (Ref:1 003-1 030) 1 015     Blood, UA (Ref: Negative) negative     pH, UA (Ref: 4 5-8 0) 6 0     Protein, UA (Ref: Negative) negative     Urobilinogen, UA (Ref: 0 2- 1 0) 0 2      Leukocytes, UA (Ref: Negative) negative     Nitrite, UA (Ref: Negative) negative    Troponin I [[id number]]  (Normal) Collected:  01/07/20 1243    Lab Status:  Final result Specimen:  Blood from Arm, Right Updated:  01/07/20 1312     Troponin I 0 02 ng/mL     Comprehensive metabolic panel [[id number]]  (Abnormal) Collected:  01/07/20 1243    Lab Status:  Final result Specimen:  Blood from Arm, Right Updated:  01/07/20 1309     Sodium 144 mmol/L      Potassium 4 8 mmol/L      Chloride 111 mmol/L      CO2 21 mmol/L      ANION GAP 12 mmol/L      BUN 79 mg/dL Creatinine 2 15 mg/dL      Glucose 214 mg/dL      Calcium 8 3 mg/dL      AST 14 U/L      ALT 19 U/L      Alkaline Phosphatase 97 U/L      Total Protein 7 0 g/dL      Albumin 3 2 g/dL      Total Bilirubin 0 50 mg/dL      eGFR 24 ml/min/1 73sq m     Narrative:       National Kidney Disease Foundation guidelines for Chronic Kidney Disease (CKD):     Stage 1 with normal or high GFR (GFR > 90 mL/min/1 73 square meters)    Stage 2 Mild CKD (GFR = 60-89 mL/min/1 73 square meters)    Stage 3A Moderate CKD (GFR = 45-59 mL/min/1 73 square meters)    Stage 3B Moderate CKD (GFR = 30-44 mL/min/1 73 square meters)    Stage 4 Severe CKD (GFR = 15-29 mL/min/1 73 square meters)    Stage 5 End Stage CKD (GFR <15 mL/min/1 73 square meters)  Note: GFR calculation is accurate only with a steady state creatinine    CBC and differential [[id number]]  (Abnormal) Collected:  01/07/20 1243    Lab Status:  Final result Specimen:  Blood from Arm, Right Updated:  01/07/20 1254     WBC 7 27 Thousand/uL      RBC 3 23 Million/uL      Hemoglobin 9 1 g/dL      Hematocrit 29 5 %      MCV 91 fL      MCH 28 2 pg      MCHC 30 8 g/dL      RDW 15 7 %      MPV 11 1 fL      Platelets 607 Thousands/uL      nRBC 0 /100 WBCs      Neutrophils Relative 73 %      Immat GRANS % 0 %      Lymphocytes Relative 10 %      Monocytes Relative 6 %      Eosinophils Relative 10 %      Basophils Relative 1 %      Neutrophils Absolute 5 29 Thousands/µL      Immature Grans Absolute 0 01 Thousand/uL      Lymphocytes Absolute 0 73 Thousands/µL      Monocytes Absolute 0 45 Thousand/µL      Eosinophils Absolute 0 75 Thousand/µL      Basophils Absolute 0 04 Thousands/µL                  CT head wo contrast   Final Result by Jag Gray MD (01/08 8170)      No acute intracranial abnormality  Chronic lacunar infarction right caudate head and right frontal periventricular white matter  Atherosclerotic vascular calcifications                    Workstation performed: [id number]         XR chest 2 views   Final Result by Lizette Fox MD (01/07 0956)      No acute cardiopulmonary disease  Workstation performed: [id number]                    Procedures  ECG 12 Lead Documentation Only  Date/Time: 1/7/2020 12:00 PM  Performed by: Boni Gates DO  Authorized by: Boni Gates DO     ECG reviewed by me, the ED Provider: yes    Patient location:  ED  Previous ECG:     Previous ECG:  Unavailable  Interpretation:     Interpretation: normal    Rate:     ECG rate:  65    ECG rate assessment: normal    Rhythm:     Rhythm: sinus rhythm    Ectopy:     Ectopy: none    QRS:     QRS axis:  Normal  ST segments:     ST segments:  Normal  T waves:     T waves: normal               ED Course  ED Course as of Jan 08 1240   Tue Jan 07, 2020   1428 Hemoglobin(!): 9 1   1428 Baseline per patient  Creatinine(!): 2 15                               MDM  Number of Diagnoses or Management Options  Syncope: new and requires workup  Diagnosis management comments: Sudden syncope w/ no prodrome - hx of cad/chf  Will ck ekg, labs, cxr and re-eval  Will likely require observation admission       Amount and/or Complexity of Data Reviewed  Clinical lab tests: ordered and reviewed  Tests in the radiology section of CPT®: ordered and reviewed  Tests in the medicine section of CPT®: ordered and reviewed  Decide to obtain previous medical records or to obtain history from someone other than the patient: yes  Obtain history from someone other than the patient: yes  Independent visualization of images, tracings, or specimens: yes    Risk of Complications, Morbidity, and/or Mortality  General comments: Medium to high risk on [place] [facility] Syncope risk calculators            Disposition  Final diagnoses:   Syncope     Time reflects when diagnosis was documented in both MDM as applicable and the Disposition within this note     Time User Action Codes Description Comment    1/7/2020 2:49 PM Kate Montelongo Add [R55] Syncope     1/7/2020  4:45 PM Carmina Krishna Add [R55] Syncope and collapse       ED Disposition     ED Disposition Condition Date/Time Comment    Admit Stable Tue Jan 7, 2020  2:49 PM Case was discussed with Dr Lavonne Benito and the patient's admission status was agreed to be Admission Status: observation status to the service of Dr Dr Lavonne Benito   Follow-up Information    None         Current Discharge Medication List      CONTINUE these medications which have NOT CHANGED    Details   amLODIPine (NORVASC) 2 5 mg tablet Take 2 5 mg by mouth daily      aspirin (ECOTRIN LOW STRENGTH) 81 mg EC tablet Take 81 mg by mouth daily      atorvastatin (LIPITOR) 40 mg tablet Take 40 mg by mouth daily      carvedilol (COREG) 25 mg tablet Take 25 mg by mouth 2 (two) times a day       clopidogrel (PLAVIX) 75 mg tablet Take 75 mg by mouth daily      !! hydrALAZINE (APRESOLINE) 25 mg tablet Take 25 mg by mouth 3 (three) times a day      !! hydrALAZINE (APRESOLINE) 50 mg tablet Take by mouth 3 (three) times a day      insulin aspart (NovoLOG) 100 units/mL injection Inject 14 Units under the skin 3 (three) times a day before meals      torsemide (DEMADEX) 20 mg tablet Take 40 mg by mouth daily      insulin glargine (LANTUS) 100 units/mL subcutaneous injection Inject 36 Units under the skin daily at bedtime       nitroglycerin (NITROSTAT) 0 4 mg SL tablet Place 0 4 mg under the tongue       !! - Potential duplicate medications found  Please discuss with provider  No discharge procedures on file      ED Provider  Electronically Signed by           Sergey Plascencia DO  01/08/20 2160

## 2020-01-07 NOTE — PLAN OF CARE
Problem: Prexisting or High Potential for Compromised Skin Integrity  Goal: Skin integrity is maintained or improved  Description  INTERVENTIONS:  - Identify patients at risk for skin breakdown  - Assess and monitor skin integrity  - Assess and monitor nutrition and hydration status  - Monitor labs   - Assess for incontinence   - Turn and reposition patient  - Assist with mobility/ambulation  - Relieve pressure over bony prominences  - Avoid friction and shearing  - Provide appropriate hygiene as needed including keeping skin clean and dry  - Evaluate need for skin moisturizer/barrier cream  - Collaborate with interdisciplinary team   - Patient/family teaching  - Consider wound care consult   Outcome: Progressing     Problem: RESPIRATORY - ADULT  Goal: Achieves optimal ventilation and oxygenation  Description  INTERVENTIONS:  - Assess for changes in respiratory status  - Assess for changes in mentation and behavior  - Position to facilitate oxygenation and minimize respiratory effort  - Oxygen administered by appropriate delivery if ordered  - Initiate smoking cessation education as indicated  - Encourage broncho-pulmonary hygiene including cough, deep breathe, Incentive Spirometry  - Assess the need for suctioning and aspirate as needed  - Assess and instruct to report SOB or any respiratory difficulty  - Respiratory Therapy support as indicated  Outcome: Progressing     Problem: HEMATOLOGIC - ADULT  Goal: Maintains hematologic stability  Description  INTERVENTIONS  - Assess for signs and symptoms of bleeding or hemorrhage  - Monitor labs  - Administer supportive blood products/factors as ordered and appropriate  Outcome: Progressing     Problem: PAIN - ADULT  Goal: Verbalizes/displays adequate comfort level or baseline comfort level  Description  Interventions:  - Encourage patient to monitor pain and request assistance  - Assess pain using appropriate pain scale  - Administer analgesics based on type and severity of pain and evaluate response  - Implement non-pharmacological measures as appropriate and evaluate response  - Consider cultural and social influences on pain and pain management  - Notify physician/advanced practitioner if interventions unsuccessful or patient reports new pain  Outcome: Progressing     Problem: SAFETY ADULT  Goal: Patient will remain free of falls  Description  INTERVENTIONS:  - Assess patient frequently for physical needs  -  Identify cognitive and physical deficits and behaviors that affect risk of falls    -  [place] fall precautions as indicated by assessment   - Educate patient/family on patient safety including physical limitations  - Instruct patient to call for assistance with activity based on assessment  - Modify environment to reduce risk of injury  - Consider OT/PT consult to assist with strengthening/mobility  Outcome: Progressing  Goal: Maintain or return to baseline ADL function  Description  INTERVENTIONS:  -  Assess patient's ability to carry out ADLs; assess patient's baseline for ADL function and identify physical deficits which impact ability to perform ADLs (bathing, care of mouth/teeth, toileting, grooming, dressing, etc )  - Assess/evaluate cause of self-care deficits   - Assess range of motion  - Assess patient's mobility; develop plan if impaired  - Assess patient's need for assistive devices and provide as appropriate  - Encourage maximum independence but intervene and supervise when necessary  - Involve family in performance of ADLs  - Assess for home care needs following discharge   - Consider OT consult to assist with ADL evaluation and planning for discharge  - Provide patient education as appropriate  Outcome: Progressing  Goal: Maintain or return mobility status to optimal level  Description  INTERVENTIONS:  - Assess patient's baseline mobility status (ambulation, transfers, stairs, etc )    - Identify cognitive and physical deficits and behaviors that affect mobility  - Identify mobility aids required to assist with transfers and/or ambulation (gait belt, sit-to-stand, lift, walker, cane, etc )  - [place] fall precautions as indicated by assessment  - Record patient progress and toleration of activity level on Mobility SBAR; progress patient to next Phase/Stage  - Instruct patient to call for assistance with activity based on assessment  - Consider rehabilitation consult to assist with strengthening/weightbearing, etc   Outcome: Progressing     Problem: DISCHARGE PLANNING  Goal: Discharge to home or other facility with appropriate resources  Description  INTERVENTIONS:  - Identify barriers to discharge w/patient and caregiver  - Arrange for needed discharge resources and transportation as appropriate  - Identify discharge learning needs (meds, wound care, etc )  - Arrange for interpretive services to assist at discharge as needed  - Refer to Case Management Department for coordinating discharge planning if the patient needs post-hospital services based on physician/advanced practitioner order or complex needs related to functional status, cognitive ability, or social support system  Outcome: Progressing     Problem: Knowledge Deficit  Goal: Patient/family/caregiver demonstrates understanding of disease process, treatment plan, medications, and discharge instructions  Description  Complete learning assessment and assess knowledge base    Interventions:  - Provide teaching at level of understanding  - Provide teaching via preferred learning methods  Outcome: Progressing

## 2020-01-08 ENCOUNTER — APPOINTMENT (OUTPATIENT)
Dept: NON INVASIVE DIAGNOSTICS | Facility: HOSPITAL | Age: 65
End: 2020-01-08
Payer: COMMERCIAL

## 2020-01-08 VITALS
DIASTOLIC BLOOD PRESSURE: 67 MMHG | HEART RATE: 64 BPM | HEIGHT: 64 IN | BODY MASS INDEX: 44.18 KG/M2 | TEMPERATURE: 98.5 F | SYSTOLIC BLOOD PRESSURE: 157 MMHG | OXYGEN SATURATION: 94 % | WEIGHT: 258.8 LBS | RESPIRATION RATE: 16 BRPM

## 2020-01-08 LAB
ALBUMIN SERPL BCP-MCNC: 3.1 G/DL (ref 3.5–5)
ALP SERPL-CCNC: 93 U/L (ref 46–116)
ALT SERPL W P-5'-P-CCNC: 22 U/L (ref 12–78)
ANION GAP SERPL CALCULATED.3IONS-SCNC: 12 MMOL/L (ref 4–13)
AST SERPL W P-5'-P-CCNC: 15 U/L (ref 5–45)
ATRIAL RATE: 63 BPM
ATRIAL RATE: 65 BPM
BILIRUB SERPL-MCNC: 0.5 MG/DL (ref 0.2–1)
BUN SERPL-MCNC: 72 MG/DL (ref 5–25)
CALCIUM SERPL-MCNC: 8.5 MG/DL (ref 8.3–10.1)
CHLORIDE SERPL-SCNC: 115 MMOL/L (ref 100–108)
CO2 SERPL-SCNC: 20 MMOL/L (ref 21–32)
CREAT SERPL-MCNC: 2.06 MG/DL (ref 0.6–1.3)
ERYTHROCYTE [DISTWIDTH] IN BLOOD BY AUTOMATED COUNT: 15.8 % (ref 11.6–15.1)
EST. AVERAGE GLUCOSE BLD GHB EST-MCNC: 166 MG/DL
GFR SERPL CREATININE-BSD FRML MDRD: 25 ML/MIN/1.73SQ M
GLUCOSE SERPL-MCNC: 119 MG/DL (ref 65–140)
GLUCOSE SERPL-MCNC: 152 MG/DL (ref 65–140)
GLUCOSE SERPL-MCNC: 154 MG/DL (ref 65–140)
GLUCOSE SERPL-MCNC: 157 MG/DL (ref 65–140)
GLUCOSE SERPL-MCNC: 58 MG/DL (ref 65–140)
HBA1C MFR BLD: 7.4 % (ref 4.2–6.3)
HCT VFR BLD AUTO: 28.8 % (ref 34.8–46.1)
HGB BLD-MCNC: 8.8 G/DL (ref 11.5–15.4)
MCH RBC QN AUTO: 28.3 PG (ref 26.8–34.3)
MCHC RBC AUTO-ENTMCNC: 30.6 G/DL (ref 31.4–37.4)
MCV RBC AUTO: 93 FL (ref 82–98)
P AXIS: 72 DEGREES
P AXIS: 74 DEGREES
PLATELET # BLD AUTO: 174 THOUSANDS/UL (ref 149–390)
PMV BLD AUTO: 10.7 FL (ref 8.9–12.7)
POTASSIUM SERPL-SCNC: 4.9 MMOL/L (ref 3.5–5.3)
PR INTERVAL: 162 MS
PR INTERVAL: 162 MS
PROT SERPL-MCNC: 6.8 G/DL (ref 6.4–8.2)
QRS AXIS: -1 DEGREES
QRS AXIS: -2 DEGREES
QRSD INTERVAL: 84 MS
QRSD INTERVAL: 88 MS
QT INTERVAL: 458 MS
QT INTERVAL: 462 MS
QTC INTERVAL: 472 MS
QTC INTERVAL: 476 MS
RBC # BLD AUTO: 3.11 MILLION/UL (ref 3.81–5.12)
SODIUM SERPL-SCNC: 147 MMOL/L (ref 136–145)
T WAVE AXIS: 65 DEGREES
T WAVE AXIS: 73 DEGREES
VENTRICULAR RATE: 63 BPM
VENTRICULAR RATE: 65 BPM
WBC # BLD AUTO: 7.72 THOUSAND/UL (ref 4.31–10.16)

## 2020-01-08 PROCEDURE — 93010 ELECTROCARDIOGRAM REPORT: CPT | Performed by: INTERNAL MEDICINE

## 2020-01-08 PROCEDURE — 85027 COMPLETE CBC AUTOMATED: CPT | Performed by: FAMILY MEDICINE

## 2020-01-08 PROCEDURE — 99217 PR OBSERVATION CARE DISCHARGE MANAGEMENT: CPT | Performed by: FAMILY MEDICINE

## 2020-01-08 PROCEDURE — 93306 TTE W/DOPPLER COMPLETE: CPT | Performed by: INTERNAL MEDICINE

## 2020-01-08 PROCEDURE — 82948 REAGENT STRIP/BLOOD GLUCOSE: CPT

## 2020-01-08 PROCEDURE — 93306 TTE W/DOPPLER COMPLETE: CPT

## 2020-01-08 PROCEDURE — 80053 COMPREHEN METABOLIC PANEL: CPT | Performed by: FAMILY MEDICINE

## 2020-01-08 PROCEDURE — 99244 OFF/OP CNSLTJ NEW/EST MOD 40: CPT | Performed by: INTERNAL MEDICINE

## 2020-01-08 RX ORDER — CEPHALEXIN 250 MG/1
250 CAPSULE ORAL EVERY 12 HOURS SCHEDULED
Qty: 10 CAPSULE | Refills: 0 | Status: SHIPPED | OUTPATIENT
Start: 2020-01-08 | End: 2020-01-13

## 2020-01-08 RX ADMIN — INSULIN LISPRO 14 UNITS: 100 INJECTION, SOLUTION INTRAVENOUS; SUBCUTANEOUS at 09:40

## 2020-01-08 RX ADMIN — INSULIN LISPRO 14 UNITS: 100 INJECTION, SOLUTION INTRAVENOUS; SUBCUTANEOUS at 12:46

## 2020-01-08 RX ADMIN — HYDRALAZINE HYDROCHLORIDE 75 MG: 25 TABLET ORAL at 09:39

## 2020-01-08 RX ADMIN — CEPHALEXIN 250 MG: 250 CAPSULE ORAL at 09:39

## 2020-01-08 RX ADMIN — CLOPIDOGREL BISULFATE 75 MG: 75 TABLET ORAL at 09:39

## 2020-01-08 RX ADMIN — ASPIRIN 81 MG: 81 TABLET, COATED ORAL at 09:39

## 2020-01-08 RX ADMIN — ATORVASTATIN CALCIUM 40 MG: 40 TABLET, FILM COATED ORAL at 09:39

## 2020-01-08 RX ADMIN — CARVEDILOL 25 MG: 12.5 TABLET, FILM COATED ORAL at 09:43

## 2020-01-08 RX ADMIN — AMLODIPINE BESYLATE 2.5 MG: 2.5 TABLET ORAL at 09:39

## 2020-01-08 RX ADMIN — TORSEMIDE 40 MG: 20 TABLET ORAL at 09:39

## 2020-01-08 NOTE — PLAN OF CARE
Problem: Prexisting or High Potential for Compromised Skin Integrity  Goal: Skin integrity is maintained or improved  Description  INTERVENTIONS:  - Identify patients at risk for skin breakdown  - Assess and monitor skin integrity  - Assess and monitor nutrition and hydration status  - Monitor labs   - Assess for incontinence   - Turn and reposition patient  - Assist with mobility/ambulation  - Relieve pressure over bony prominences  - Avoid friction and shearing  - Provide appropriate hygiene as needed including keeping skin clean and dry  - Evaluate need for skin moisturizer/barrier cream  - Collaborate with interdisciplinary team   - Patient/family teaching  - Consider wound care consult   Outcome: Progressing     Problem: RESPIRATORY - ADULT  Goal: Achieves optimal ventilation and oxygenation  Description  INTERVENTIONS:  - Assess for changes in respiratory status  - Assess for changes in mentation and behavior  - Position to facilitate oxygenation and minimize respiratory effort  - Oxygen administered by appropriate delivery if ordered  - Initiate smoking cessation education as indicated  - Encourage broncho-pulmonary hygiene including cough, deep breathe, Incentive Spirometry  - Assess the need for suctioning and aspirate as needed  - Assess and instruct to report SOB or any respiratory difficulty  - Respiratory Therapy support as indicated  Outcome: Progressing     Problem: HEMATOLOGIC - ADULT  Goal: Maintains hematologic stability  Description  INTERVENTIONS  - Assess for signs and symptoms of bleeding or hemorrhage  - Monitor labs  - Administer supportive blood products/factors as ordered and appropriate  Outcome: Progressing     Problem: PAIN - ADULT  Goal: Verbalizes/displays adequate comfort level or baseline comfort level  Description  Interventions:  - Encourage patient to monitor pain and request assistance  - Assess pain using appropriate pain scale  - Administer analgesics based on type and severity of pain and evaluate response  - Implement non-pharmacological measures as appropriate and evaluate response  - Consider cultural and social influences on pain and pain management  - Notify physician/advanced practitioner if interventions unsuccessful or patient reports new pain  Outcome: Progressing     Problem: SAFETY ADULT  Goal: Patient will remain free of falls  Description  INTERVENTIONS:  - Assess patient frequently for physical needs  -  Identify cognitive and physical deficits and behaviors that affect risk of falls    -  [place] fall precautions as indicated by assessment   - Educate patient/family on patient safety including physical limitations  - Instruct patient to call for assistance with activity based on assessment  - Modify environment to reduce risk of injury  - Consider OT/PT consult to assist with strengthening/mobility  Outcome: Progressing  Goal: Maintain or return to baseline ADL function  Description  INTERVENTIONS:  -  Assess patient's ability to carry out ADLs; assess patient's baseline for ADL function and identify physical deficits which impact ability to perform ADLs (bathing, care of mouth/teeth, toileting, grooming, dressing, etc )  - Assess/evaluate cause of self-care deficits   - Assess range of motion  - Assess patient's mobility; develop plan if impaired  - Assess patient's need for assistive devices and provide as appropriate  - Encourage maximum independence but intervene and supervise when necessary  - Involve family in performance of ADLs  - Assess for home care needs following discharge   - Consider OT consult to assist with ADL evaluation and planning for discharge  - Provide patient education as appropriate  Outcome: Progressing  Goal: Maintain or return mobility status to optimal level  Description  INTERVENTIONS:  - Assess patient's baseline mobility status (ambulation, transfers, stairs, etc )    - Identify cognitive and physical deficits and behaviors that affect mobility  - Identify mobility aids required to assist with transfers and/or ambulation (gait belt, sit-to-stand, lift, walker, cane, etc )  - [place] fall precautions as indicated by assessment  - Record patient progress and toleration of activity level on Mobility SBAR; progress patient to next Phase/Stage  - Instruct patient to call for assistance with activity based on assessment  - Consider rehabilitation consult to assist with strengthening/weightbearing, etc   Outcome: Progressing     Problem: DISCHARGE PLANNING  Goal: Discharge to home or other facility with appropriate resources  Description  INTERVENTIONS:  - Identify barriers to discharge w/patient and caregiver  - Arrange for needed discharge resources and transportation as appropriate  - Identify discharge learning needs (meds, wound care, etc )  - Arrange for interpretive services to assist at discharge as needed  - Refer to Case Management Department for coordinating discharge planning if the patient needs post-hospital services based on physician/advanced practitioner order or complex needs related to functional status, cognitive ability, or social support system  Outcome: Progressing     Problem: Knowledge Deficit  Goal: Patient/family/caregiver demonstrates understanding of disease process, treatment plan, medications, and discharge instructions  Description  Complete learning assessment and assess knowledge base  Interventions:  - Provide teaching at level of understanding  - Provide teaching via preferred learning methods  Outcome: Progressing     Problem: Potential for Falls  Goal: Patient will remain free of falls  Description  INTERVENTIONS:  - Assess patient frequently for physical needs  -  Identify cognitive and physical deficits and behaviors that affect risk of falls    -  [place] fall precautions as indicated by assessment   - Educate patient/family on patient safety including physical limitations  - Instruct patient to call for assistance with activity based on assessment  - Modify environment to reduce risk of injury  - Consider OT/PT consult to assist with strengthening/mobility  Outcome: Progressing

## 2020-01-08 NOTE — ASSESSMENT & PLAN NOTE
Pt states that she was found to have a "stable aneursym" in her neck at [facility] in the Fall  Was told simply to monitor  To return in 6 months for follow-up  Pt is very afraid of IV contrast due to her kidney disease  CT head was done without contrast showed asthersclerotic disease of the internal carotid  Do not believe this was related to patient's presentation- syncope most likely 2/2 arrythmia  Pt should follow-up with vascular as scheduled at [facility]

## 2020-01-08 NOTE — ASSESSMENT & PLAN NOTE
Pt states that she was found to have a "stable aneursym" in her neck at [facility] in the Fall  Was told simply to monitor  To return in 6 months for follow-up  Pt is very afraid of IV contrast due to her kidney disease  Will consider further imaging, workup as needed  Cards consulted  Will consider vascular consult if needed

## 2020-01-08 NOTE — UTILIZATION REVIEW
Initial Clinical Review    Admission: Date/Time/Statement:  1/7/2020 1449 Observation   Orders Placed This Encounter   Procedures    Place in Observation     Standing Status:   Standing     Number of Occurrences:   1     Order Specific Question:   Admitting Physician     Answer:   Meño Pennington [[id number]]     Order Specific Question:   Level of Care     Answer:   Med Surg [[id number]]     ED Arrival Information     Expected Arrival Acuity Means of Arrival Escorted By Service Admission Type    - 1/7/2020 11:23 Urgent Walk-In Family Member General Medicine Urgent    Arrival Complaint    syncope, dizzy, SOB        Chief Complaint   Patient presents with    Syncope     pt presents to ER stating she woke up to get ready for work, got out of bed and passed out  reports nausea and neck pain     Assessment/Plan: this is a 62year old female from home to ED admitted as observation due to syncope R/O arrhthymias  Presented post syncope, ws in kitchen preparing lunch before work walked to bathroom and went down  Does not remember event, awoke on floor  Since Friday treated with antibiotics for LLE cellulitis  Pt has extensive cardiac hx, with MI s/p 3 stents  approx 5 years ago  Pt also denies having hx of any arrythmia in the past, but previous LVN problem list mentions afib not on medication  Pt also mentioned that she had been found to have a "stable aneursym" in her neck  On exam has minor area on cellulitis bilateral LE   EKG showed sinus  Bun 709,  Creatinine 2 15 with baseline of 2 5   Glucose 214 with history of diabetes  Telemetry, cardiology consult and echo in progress       ED Triage Vitals   Temperature Pulse Respirations Blood Pressure SpO2   01/07/20 1136 01/07/20 1136 01/07/20 1136 01/07/20 1137 01/07/20 1136   (!) 97 2 °F (36 2 °C) 66 19 166/68 96 %      Temp src Heart Rate Source Patient Position - Orthostatic VS BP Location FiO2 (%)   -- 01/07/20 1136 -- -- --    Monitor         Pain Score       01/07/20 1136 7        Wt Readings from Last 1 Encounters:   01/07/20 117 kg (258 lb 12 8 oz)     Additional Vital Signs:   01/08/20 07:40:56  98 5 °F (36 9 °C)  66  16  157/67  97  89 %Abnormal      01/07/20 23:14:45  98 5 °F (36 9 °C)  69        92 %     01/07/20 2309        162/74         01/07/20 15:38:05  97 7 °F (36 5 °C)  66    142/62  89  94 %         Pertinent Labs/Diagnostic Test Results:  1/7/2020 EKG - sinus  Normal ST and T       1/7/2020 CxR - No acute cardiopulmonary disease      1/8/2020 CT head - No acute intracranial abnormality     Chronic lacunar infarction right caudate head and right frontal periventricular white matter   Atherosclerotic vascular calcifications  Results from last 7 days   Lab Units 01/08/20  0511 01/07/20  1243   WBC Thousand/uL 7 72 7 27   HEMOGLOBIN g/dL 8 8* 9 1*   HEMATOCRIT % 28 8* 29 5*   PLATELETS Thousands/uL 174 184   NEUTROS ABS Thousands/µL  --  5 29     Results from last 7 days   Lab Units 01/08/20  0511 01/07/20  1243   SODIUM mmol/L 147* 144   POTASSIUM mmol/L 4 9 4 8   CHLORIDE mmol/L 115* 111*   CO2 mmol/L 20* 21   ANION GAP mmol/L 12 12   BUN mg/dL 72* 79*   CREATININE mg/dL 2 06* 2 15*   EGFR ml/min/1 73sq m 25 24   CALCIUM mg/dL 8 5 8 3     Results from last 7 days   Lab Units 01/08/20  0511 01/07/20  1243   AST U/L 15 14   ALT U/L 22 19   ALK PHOS U/L 93 97   TOTAL PROTEIN g/dL 6 8 7 0   ALBUMIN g/dL 3 1* 3 2*   TOTAL BILIRUBIN mg/dL 0 50 0 50     Results from last 7 days   Lab Units 01/08/20  0740 01/07/20  2204   POC GLUCOSE mg/dl 154* 166*     Results from last 7 days   Lab Units 01/08/20  0511 01/07/20  1243   GLUCOSE RANDOM mg/dL 157* 214*     Results from last 7 days   Lab Units 01/07/20  1243   HEMOGLOBIN A1C % 7 4*   EAG mg/dl 166     Results from last 7 days   Lab Units 01/07/20  1243   TROPONIN I ng/mL 0 02     Results from last 7 days   Lab Units 01/07/20  1433   CLARITY UA  clear   COLOR UA  clear   SPEC GRAV US  1 015   PH UA  6 0   GLUCOSE UA  negative   KETONES UA  negative   BLOOD UA  negative   PROTEIN UA  negative   NITRITE UA  negative   BILIRUBIN, UA  negative   UROBILINOGEN UA  0 2   LEUKOCYTES UA  negative       ED Treatment:   Medication Administration from 01/07/2020 1123 to 01/07/2020 1531     None        Past Medical History:   Diagnosis Date    Cardiac disease     CHF (congestive heart failure) ([place] [address] )     Coronary artery disease     Diabetes mellitus ([place] [address] )     Hyperlipidemia     Hypertension     Renal disorder     TIA (transient ischemic attack)      Present on Admission:   Syncope and collapse   [facility]) [facility]   Hypertension   Stage 4 chronic kidney disease ([facility])   Obesity   Cerebral aneurysm without rupture   Coronary artery disease   Diabetes mellitus ([place] [address] )   Lower extremity cellulitis      Admitting Diagnosis: Syncope [R55]  Age/Sex: 62 y o  female  Admission Orders: 1/7/2020 1449 Observation   Scheduled Medications:  Medications:  amLODIPine 2 5 mg Oral Daily   aspirin 81 mg Oral Daily   atorvastatin 40 mg Oral Daily   carvedilol 25 mg Oral BID With Meals   cephalexin 250 mg Oral Q12H [facility] & intermediate   clopidogrel 75 mg Oral Daily   hydrALAZINE 75 mg Oral TID   insulin glargine 36 Units Subcutaneous HS   insulin lispro 14 Units Subcutaneous TID With Meals   torsemide 40 mg Oral Daily     Continuous IV Infusions:     PRN Meds:       IP CONSULT TO CARDIOLOGY    Network Utilization Review Department  [email or web address]  ATTENTION: Please call with any questions or concerns to [phone] and carefully listen to the prompts so that you are directed to the right person  All voicemails are confidential   Robert Bhatt all requests for admission clinical reviews, approved or denied determinations and any other requests to dedicated fax number below belonging to the campus where the patient is receiving treatment   List of dedicated fax numbers for the Facilities:  [facility] ADMISSION DENIALS (Administrative/Medical Necessity) [address] [facility] (Maternity/NICU/Pediatrics) [place] [phone]     [address] [phone]   [facility] [phone]   [facility] [phone]   [facility]  [phone]   [address] [facility], [address]   [address] [place] [address] And [address] Tia Becker [phone]

## 2020-01-08 NOTE — ASSESSMENT & PLAN NOTE
Wt Readings from Last 3 Encounters:   01/07/20 117 kg (258 lb 12 8 oz)   11/28/17 119 kg (263 lb)   08/28/17 113 kg (250 lb)     Pt's +1 b/l FAUSTINO improved overnight  Continue pt's home toresemide

## 2020-01-08 NOTE — ASSESSMENT & PLAN NOTE
No results found for: HGBA1C  Will add to AM blood draw  No results for input(s): POCGLU in the last 72 hours  Blood Sugar Average: Last 72 hrs:     Pt is on Toujeo 36 units at home  Novolog 14 units with meals  Continue lantus, humalog while inpt  Will add ISS if still uncontrolled  CC diet

## 2020-01-08 NOTE — ASSESSMENT & PLAN NOTE
Hx of uncontrolled HTN that has recently been controlled on amlodipine, coreg, hydralazine   Continue home doses on discharge

## 2020-01-08 NOTE — ASSESSMENT & PLAN NOTE
Hx of uncontrolled HTN that has recently been controlled on amlodipine, coreg, hydralazine    Will continue home doses on admission

## 2020-01-08 NOTE — ASSESSMENT & PLAN NOTE
Lab Results   Component Value Date    HGBA1C 7 4 (H) 01/07/2020        Recent Labs     01/07/20  2204 01/08/20  0740 01/08/20  1122   POCGLU 166* 154* 152*       Blood Sugar Average: Last 72 hrs:  (P) 085 [id number]   Upon admission, pt stated that she was on Toujeo 36 units q AM &  Novolog 14 units standing with meals  This dosing was continued inpatient and this evening pt experienced low blood sugar of 58 that quickly improved with eating to 119  Upon further discussion with pt, she stated that she incorrectly stated 14 units with meals but that she uses a sliding scale depending on what she's eating  This was corrected in the med rec  Pt should continue to closely monitor her blood sugars upon discharge and continue her previous home dosing

## 2020-01-08 NOTE — SOCIAL WORK
LOS: 21 hours  Pt is not a documented bundle  Pt is not a 30 day readmission  Met with Pt  Pt represents AA&Ox3  Discussed role of , discharge planning, identifying help at home and discharge preference  Pt reports she lives with her dtr(Ericka) in [place], has 1st floor setup, 3 nora  Pt reports she is independent with adls and uses cane to get in and out of house  Pt drives  Pt uses [facility] in [place]  Pt reports she has prescription plan and is able to afford medications  Pt reports she does not have living will or POA and is agreeable for information  Pt denies hx of SNF and VNA  Pt denies hx of mental health and drug and alcohol treatment  Pt reports her dtr will help her at home if needed  Pt reports her preference is to return home and does not anticipate discharge needs  Pt reports her dtr will transport her home  Will follow  Information given to Pt for POA and living will

## 2020-01-08 NOTE — H&P
H&P- Radha Pearce 1955, 62 y o  female MRN: [id number]    Unit/Bed#: -01 Encounter: [id number]    Primary Care Provider: Rosenda Hall DO   Date and time admitted to hospital: 1/7/2020 11:49 AM        * Syncope and collapse  Assessment & Plan  Possibly secondary to arrhythmia given pt's description: no prescynopal symptoms  Pt was making lunch in her kitchen and then was walking to her bedroom when all she remembers is waking up on the floor  Pt denies hx of arrythmia, afib in the past- but there is mention of it as a past problem in a LVN note  Admit to telemetry  CT head  Cards consult  Echo    Lower extremity cellulitis  Assessment & Plan  Pt presents with some minor localized cellulitis of b/l LE that pt has been being treated with Keflex and per pt has significantly improved  Will continue keflex     Diabetes mellitus ([facility] )  Assessment & Plan  No results found for: HGBA1C  Will add to AM blood draw  No results for input(s): POCGLU in the last 72 hours  Blood Sugar Average: Last 72 hrs:     Pt is on Toujeo 36 units at home  Novolog 14 units with meals  Continue lantus, humalog while inpt  Will add ISS if still uncontrolled  CC diet  Coronary artery disease  Assessment & Plan  Pt with hx of MI s/p 3 stents  Continue ASA, Plavix, statin    Hypertension  Assessment & Plan  Hx of uncontrolled HTN that has recently been controlled on amlodipine, coreg, hydralazine  Will continue home doses on admission    CHF (congestive heart failure) ([facility] )  Assessment & Plan  Wt Readings from Last 3 Encounters:   01/07/20 117 kg (258 lb 12 8 oz)   11/28/17 119 kg (263 lb)   08/28/17 113 kg (250 lb)     Pt's has+1 b/l FAUSTINO  Will continue pt's home toresemide  Will consider increasing as needed  Cerebral aneurysm without rupture  Assessment & Plan  Pt states that she was found to have a "stable aneursym" in her neck at [facility] in the Fall  Was told simply to monitor    To return in 6 months for follow-up  Pt is very afraid of IV contrast due to her kidney disease  Will consider further imaging, workup as needed  Cards consulted  Will consider vascular consult if needed  Obesity  Assessment & Plan  Pt states that her weight has not changed recently  BMI 44  Encourage lifestyle, diet changes        Stage 4 chronic kidney disease ([facility] )  Assessment & Plan  Pt states that her baseline Cr is approx 2 5  Pt's Cr  2 15 today  If pt is correct, CKD is stable  VTE Prophylaxis: Heparin  / reason for no mechanical VTE prophylaxis LE cellulitis   Code Status: full      Anticipated Length of Stay:  Patient will be admitted on an Observation basis with an anticipated length of stay of  Less than 2 midnights  Justification for Hospital Stay: telemetry, further syncopal workup    Total Time for Visit, including Counseling / Coordination of Care: 45 minutes  Greater than 50% of this total time spent on direct patient counseling and coordination of care  Chief Complaint:   Syncope and collapse    History of Present Illness:    Misty Piedra is a 62 y o  female who presents with episode of syncope this AM   Pt had been in her kitchen preparing lunch before work and then was walking to the bathroom on the same floor when she believes that she simply went down  Pt does not remember the event  Just remembers waking up on the floor  Called her daughter  Daughter came and then convinced pt to come to the hospital   Pt has no previous hx of syncope  Pt has extensive cardiac hx, with MI s/p 3 stents  approx 5 years ago  Pt also denies having hx of any arrythmia in the past, but previous LVN problem list mentions afib not on medication  Pt also mentioned that she had been found to have a "stable aneursym" in her neck  States that she was simply to follow-up in 6 months at [facility]        Pt is currently changing all of her medical care providers from [facility] and so has not been following up there     Pt states that she didn't feel completely "right" this AM   Vague feeling  Pt has been treating some mild lower extremity cellulitis with antibiotics since Friday  Review of Systems:    Review of Systems   Constitutional: Negative for activity change, appetite change, chills, diaphoresis, fatigue, fever and unexpected weight change  HENT: Negative for rhinorrhea and sinus pressure  Respiratory: Negative for apnea, cough, chest tightness and shortness of breath  Cardiovascular: Positive for leg swelling  Negative for chest pain  Gastrointestinal: Negative for abdominal pain, diarrhea and nausea  Musculoskeletal: Negative for gait problem and myalgias  Skin:        LE cellulitis   Neurological: Positive for syncope  Negative for dizziness, light-headedness and headaches  Past Medical and Surgical History:     Past Medical History:   Diagnosis Date    Cardiac disease     CHF [facility]congest[facility] failure) ([facility] )     Coronary artery disease     Diabetes mellitus ([place] [address] )     Hyperlipidemia     Hypertension     Renal disorder     TIA (transient ischemic attack)        Past Surgical History:   Procedure Laterality Date    CARDIAC SURGERY       SECTION      CORONARY ANGIOPLASTY WITH STENT PLACEMENT         Meds/Allergies:    Prior to Admission medications    Medication Sig Start Date End Date Taking?  Authorizing Provider   amLODIPine (NORVASC) 2 5 mg tablet Take 2 5 mg by mouth daily   Yes Historical Provider, MD   aspirin (ECOTRIN LOW STRENGTH) 81 mg EC tablet Take 81 mg by mouth daily   Yes Historical Provider, MD   atorvastatin (LIPITOR) 40 mg tablet Take 40 mg by mouth daily   Yes Historical Provider, MD   carvedilol (COREG) 25 mg tablet Take 25 mg by mouth 2 (two) times a day  3/18/19 3/17/20 Yes Historical Provider, MD   clopidogrel (PLAVIX) 75 mg tablet Take 75 mg by mouth daily   Yes Historical Provider, MD   hydrALAZINE (APRESOLINE) 25 mg tablet Take 25 mg by mouth 3 (three) times a day   Yes Historical Provider, MD   hydrALAZINE (APRESOLINE) 50 mg tablet Take by mouth 3 (three) times a day   Yes Historical Provider, MD   insulin aspart (NovoLOG) 100 units/mL injection Inject 14 Units under the skin 3 (three) times a day before meals   Yes Historical Provider, MD   torsemide (DEMADEX) 20 mg tablet Take 40 mg by mouth daily   Yes Historical Provider, MD   insulin glargine (LANTUS) 100 units/mL subcutaneous injection Inject 36 Units under the skin daily at bedtime     Historical Provider, MD   nitroglycerin (NITROSTAT) 0 4 mg SL tablet Place 0 4 mg under the tongue 11/5/18   Historical Provider, MD   lisinopril (ZESTRIL) 20 mg tablet Take 20 mg by mouth daily  1/7/20  Historical Provider, MD   metoprolol succinate (TOPROL-XL) 25 mg 24 hr tablet Take 75 mg by mouth daily  1/7/20  Historical Provider, MD   spironolactone (ALDACTONE) 25 mg tablet Take 12 5 mg by mouth daily    1/7/20  Historical Provider, MD     I have reviewed home medications with patient personally  Allergies: Allergies   Allergen Reactions    Morphine     Penicillins        Social History:     Marital Status:     Occupation: unknown  Patient Pre-hospital Living Situation: independently at home  Patient Pre-hospital Level of Mobility: ambulates without assistance  Substance Use History:   Social History     Substance and Sexual Activity   Alcohol Use Never    Frequency: Never    Binge frequency: Never    Comment: socially     Social History     Tobacco Use   Smoking Status Former Smoker    Years: 40 00   Smokeless Tobacco Never Used   Tobacco Comment    quit 1980     Social History     Substance and Sexual Activity   Drug Use No       Family History:    non-contributory    Physical Exam:     Vitals:   Blood Pressure: 142/62 (01/07/20 1538)  Pulse: 66 (01/07/20 1538)  Temperature: 97 7 °F (36 5 °C) (01/07/20 1538)  Respirations: 18 (01/07/20 1430)  Height: 5' 4" (162 6 cm) (01/07/20 1748)  Weight - Scale: 117 kg (258 lb 12 8 oz) (01/07/20 1748)  SpO2: 94 % (01/07/20 1538)    Physical Exam   Constitutional: She is oriented to person, place, and time  obese   HENT:   Head: Normocephalic and atraumatic  Eyes: Pupils are equal, round, and reactive to light  Conjunctivae and EOM are normal    Cardiovascular: Normal rate, regular rhythm and normal heart sounds  Pulmonary/Chest: Effort normal and breath sounds normal  No respiratory distress  Abdominal: Soft  Musculoskeletal: She exhibits edema  Neurological: She is alert and oriented to person, place, and time  Skin: There is erythema  Areas of minor cellulitis of b/l LE       Additional Data:     Lab Results: I have personally reviewed pertinent reports  Results from last 7 days   Lab Units 01/07/20  1243   WBC Thousand/uL 7 27   HEMOGLOBIN g/dL 9 1*   HEMATOCRIT % 29 5*   PLATELETS Thousands/uL 184   NEUTROS PCT % 73   LYMPHS PCT % 10*   MONOS PCT % 6   EOS PCT % 10*     Results from last 7 days   Lab Units 01/07/20  1243   SODIUM mmol/L 144   POTASSIUM mmol/L 4 8   CHLORIDE mmol/L 111*   CO2 mmol/L 21   BUN mg/dL 79*   CREATININE mg/dL 2 15*   ANION GAP mmol/L 12   CALCIUM mg/dL 8 3   ALBUMIN g/dL 3 2*   TOTAL BILIRUBIN mg/dL 0 50   ALK PHOS U/L 97   ALT U/L 19   AST U/L 14   GLUCOSE RANDOM mg/dL 214*                       Imaging: I have personally reviewed pertinent reports  XR chest 2 views   Final Result by Marquita Gutiérrez MD (01/07 1635)      No acute cardiopulmonary disease  Workstation performed: [id number]         CT head wo contrast    (Results Pending)       EKG, Pathology, and Other Studies Reviewed on Admission:   · EKG: NSR    Allscripts / Epic Records Reviewed: No     ** Please Note: This note has been constructed using a voice recognition system   **

## 2020-01-08 NOTE — ASSESSMENT & PLAN NOTE
Possibly secondary to arrhythmia given pt's description: no prescynopal symptoms  Pt was making lunch in her kitchen and then was walking to her bedroom when all she remembers is waking up on the floor  Pt denies hx of arrythmia, afib in the past- but there is mention of it as a past problem in a LVN note  Admitted to telemetry overnight  No events  CT head neg for acute intracranial abnormality  Showed old lacunar infarcts  Cards consulted and saw pt  Will see pt in office next week for loop recorder placement  Pt advised not to drive or work until she follows up with cards

## 2020-01-08 NOTE — DISCHARGE SUMMARY
Discharge- Christo Anacoco 1955, 62 y o  female MRN: [id number]    Unit/Bed#: -01 Encounter: [id number]    Primary Care Provider: Jose Ramon Romero DO   Date and time admitted to hospital: 1/7/2020 11:49 AM        * Syncope and collapse  Assessment & Plan  Possibly secondary to arrhythmia given pt's description: no prescynopal symptoms  Pt was making lunch in her kitchen and then was walking to her bedroom when all she remembers is waking up on the floor  Pt denies hx of arrythmia, afib in the past- but there is mention of it as a past problem in a LVN note  Admitted to telemetry overnight  No events  CT head neg for acute intracranial abnormality  Showed old lacunar infarcts  Cards consulted and saw pt  Will see pt in office next week for loop recorder placement  Pt advised not to drive or work until she follows up with cards  Lower extremity cellulitis  Assessment & Plan  Pt presents with some minor localized cellulitis of b/l LE that pt has been being treated with Keflex and per pt had significantly improved  With LE elevation, today appears even better- minimal swelling, minimal erythema  Will continue keflex x 5 days and recommend LE elevation on discharge  Diabetes mellitus [facility])  Assessment & Plan  Lab Results   Component Value Date    HGBA1C 7 4 (H) 01/07/2020        Recent Labs     01/07/20  2204 01/08/20  0740 01/08/20  1122   POCGLU 166* 154* 152*       Blood Sugar Average: Last 72 hrs:  (P) 829 [id number]   Upon admission, pt stated that she was on Toujeo 36 units q AM &  Novolog 14 units standing with meals  This dosing was continued inpatient and this evening pt experienced low blood sugar of 58 that quickly improved with eating to 119  Upon further discussion with pt, she stated that she incorrectly stated 14 units with meals but that she uses a sliding scale depending on what she's eating  This was corrected in the med rec      Pt should continue to closely monitor her blood sugars upon discharge and continue her previous home dosing  Coronary artery disease  Assessment & Plan  Pt with hx of MI s/p 3 stents  Continue ASA, Plavix, statin on discharge    Hypertension  Assessment & Plan  Hx of uncontrolled HTN that has recently been controlled on amlodipine, coreg, hydralazine  Continue home doses on discharge    CHF (congestive heart failure) ([facility] )  Assessment & Plan  Wt Readings from Last 3 Encounters:   01/07/20 117 kg (258 lb 12 8 oz)   11/28/17 119 kg (263 lb)   08/28/17 113 kg (250 lb)     Pt's +1 b/l FAUSTINO improved overnight  Continue pt's home toresemide  Cerebral aneurysm without rupture  Assessment & Plan  Pt states that she was found to have a "stable aneursym" in her neck at [facility] in the Fall  Was told simply to monitor  To return in 6 months for follow-up  Pt is very afraid of IV contrast due to her kidney disease  CT head was done without contrast showed asthersclerotic disease of the internal carotid  Do not believe this was related to patient's presentation- syncope most likely 2/2 arrythmia  Pt should follow-up with vascular as scheduled at [facility]  Obesity  Assessment & Plan  Pt states that her weight has not changed recently  BMI 44  Encourage lifestyle, diet changes        Stage 4 chronic kidney disease ([facility] )  Assessment & Plan  Pt states that her baseline Cr is approx 2 5  Pt's Cr  2 06 today  If pt is correct, CKD is stable          Discharging Physician / Practitioner: Yuni Marino DO  PCP: Ananth Mo DO  Admission Date:   Admission Orders (From admission, onward)     Ordered        01/07/20 1449  Place in Observation  Once                   Discharge Date: 01/08/20    Resolved Problems  Date Reviewed: 1/7/2020    None          Consultations During Hospital Stay:  · Cardiology    Procedures Performed:   · Head CT    Significant Findings / Test Results:   · Head CT- no acute intracranial abnormality    Incidental Findings:   Chronic lacunar infarction right caudate head and right frontal periventricular white matter  Atherosclerotic vascular calcifications  Test Results Pending at Discharge (will require follow up):   · none     Outpatient Tests Requested:  · none    Complications:  none    Reason for Admission:  Syncope and collapse    Hospital Course:     Rajinder Stevenson is a 62 y o  female patient who originally presented to the hospital on 1/7/2020 due to episode of synocope  Pt had been in her kitchen preparing lunch before work and then was walking to the bathroom on the same floor when she believes that she simply went down  Pt does not remember the event  Just remembers waking up on the floor  Called her daughter  Daughter came and then convinced pt to come to the hospital   Pt had no previous hx of syncope  Pt had extensive cardiac hx s/p 3 stents, but denied any hx of arrthymia  Pt was admitted to observation with telemetry monitoring due to concern for possible arrrythmia  However, telemetry revealed no rhythm disturbances  Cardiology was consulted and recommended outpt follow-up for loop monitor placement, with driving and work restrictions for a week  Pt was admitted with improving lower extremity cellulitis treated with keflex that continued to improve while here in the hospital  Pt discharged home on keflex and instructed to elevated her feet  Upon admission, pt stated that she was on Toujeo 36 units q AM &  Novolog 14 units standing with meals  Pt stated that her BS was running high at home on admission  This dosing was continued inpatient and this evening pt experienced low blood sugar of 58 that quickly improved with eating to 119  Upon further discussion with pt, she stated that she incorrectly stated 14 units with meals but that she uses a sliding scale depending on what she's eating  This was corrected in the med rec      Pt should continue to closely monitor her blood sugars upon discharge and continue her previous home dosing  Please see above list of diagnoses and related plan for additional information  Condition at Discharge: good     Discharge Day Visit / Exam:     Subjective:  Pt seen and examined at bedside  Doing well  No acute events overnight per nurse and patient  Pt said that she was feeling better but still didn't feel great, most likely due to her LE celulitis- although it was continuing to improve  Vitals: Blood Pressure: 157/67 (01/08/20 0740)  Pulse: 64 (01/08/20 1700)  Temperature: 98 5 °F (36 9 °C) (01/08/20 0740)  Respirations: 16 (01/08/20 0740)  Height: 5' 4" (162 6 cm) (01/07/20 1748)  Weight - Scale: 117 kg (258 lb 12 8 oz) (01/07/20 1748)  SpO2: 94 % (01/08/20 1700)  Exam:   Physical Exam   Constitutional: She is oriented to person, place, and time  obese   HENT:   Head: Normocephalic and atraumatic  Eyes: Pupils are equal, round, and reactive to light  Conjunctivae and EOM are normal    Cardiovascular: Normal rate, regular rhythm and normal heart sounds  Pulmonary/Chest: Effort normal and breath sounds normal    Abdominal: Soft  Bowel sounds are normal    Neurological: She is alert and oriented to person, place, and time  Skin:   B/l +1 FAUSTINO & erythema improved with elevation and  Abx,     Psychiatric: She has a normal mood and affect  Her behavior is normal  Judgment normal        Discussion with Family: daughter    Discharge instructions/Information to patient and family:   See after visit summary for information provided to patient and family  Provisions for Follow-Up Care:  See after visit summary for information related to follow-up care and any pertinent home health orders  Disposition:     Home    For Discharges to [facility] SNF:   · Not Applicable to this Patient - Not Applicable to this Patient    Planned Readmission: NA     Discharge Statement:  I spent 30 minutes discharging the patient   This time was spent on the day of discharge  I had direct contact with the patient on the day of discharge  Greater than 50% of the total time was spent examining patient, answering all patient questions, arranging and discussing plan of care with patient as well as directly providing post-discharge instructions  Additional time then spent on discharge activities  Discharge Medications:  See after visit summary for reconciled discharge medications provided to patient and family        ** Please Note: This note has been constructed using a voice recognition system **

## 2020-01-08 NOTE — ASSESSMENT & PLAN NOTE
Pt presents with some minor localized cellulitis of b/l LE that pt has been being treated with Keflex and per pt had significantly improved  With LE elevation, today appears even better- minimal swelling, minimal erythema  Will continue keflex x 5 days and recommend LE elevation on discharge

## 2020-01-08 NOTE — ASSESSMENT & PLAN NOTE
Wt Readings from Last 3 Encounters:   01/07/20 117 kg (258 lb 12 8 oz)   11/28/17 119 kg (263 lb)   08/28/17 113 kg (250 lb)     Pt's has+1 b/l FAUSTINO  Will continue pt's home toresemide  Will consider increasing as needed

## 2020-01-08 NOTE — NURSING NOTE
Patient to be discharged to home in stable condition  Instructions given to patient  Daughter to take her home

## 2020-01-08 NOTE — ASSESSMENT & PLAN NOTE
Pt presents with some minor localized cellulitis of b/l LE that pt has been being treated with Keflex and per pt has significantly improved    Will continue keflex

## 2020-01-08 NOTE — CONSULTS
Consultation - Cardiology   Ирина Romo 62 y o  female MRN: [id number]  Unit/Bed#: -01 Encounter: [id number]    Assessment/Plan     Assessment:    Syncope    CAD    PAF      Plan: This is her first episode of syncope  There was no prodrome  EKG shows NSR  BP is stable  EF is normal without valvular disease  Recommend thirty day event monitor for further evaluation  CAD: no angina and no clear ischemia  Continue with usual medical therapy    PAF: She is in sinus rhythm  Not on anticoagulation due to cerebral aneurysm  She would like to followup with us and we will order event monitor  History of Present Illness   Physician Requesting Consult: Rodger Dan DO  Reason for Consult / Principal Problem: Syncope  HPI: Ирина Romo is a 62y o  year old female who presents with syncope    She has a history of CAD and history of RON to LAD and she does have history of residual D1 disease  She has a history of TIA and atrial fibrillation, however not on anticoagulation due to a cerebral aneurysm  She also has chronic diastolic CHF  SHe has no prior history of syncope  She was walking in her bedroom and passed out without warning  She otherwise has had no chest pain or dyspnea or palpitations  Inpatient consult to Cardiology  Consult performed by: Bebo Saleh MD  Consult ordered by: Rodger Dan DO          Review of Systems   Constitutional: Negative  HENT: Negative  Eyes: Negative  Respiratory: Negative  Cardiovascular: Negative  Gastrointestinal: Negative  Endocrine: Negative  Genitourinary: Negative  Musculoskeletal: Negative  Skin: Negative  Allergic/Immunologic: Negative  Neurological: Positive for syncope  Hematological: Negative  Psychiatric/Behavioral: Negative          Historical Information   Past Medical History:   Diagnosis Date    Cardiac disease     [facility]congestive [facility] failure) [facility] [address] [facility]     Coronary artery disease     Diabetes mellitus ([facility] [address] [facility]     Hyperlipidemia     Hypertension     Renal disorder     TIA (transient ischemic attack)      Past Surgical History:   Procedure Laterality Date    CARDIAC SURGERY       SECTION      CORONARY ANGIOPLASTY WITH STENT PLACEMENT       Social History     Substance and Sexual Activity   Alcohol Use Never    Frequency: Never    Binge frequency: Never    Comment: socially     Social History     Substance and Sexual Activity   Drug Use No     Social History     Tobacco Use   Smoking Status Former Smoker    Years: 40 00   Smokeless Tobacco Never Used   Tobacco Comment    quit      Family History: History reviewed  No pertinent family history  Meds/Allergies   current meds:   Current Facility-Administered Medications   Medication Dose Route Frequency    amLODIPine (NORVASC) tablet 2 5 mg  2 5 mg Oral Daily    aspirin (ECOTRIN LOW STRENGTH) EC tablet 81 mg  81 mg Oral Daily    atorvastatin (LIPITOR) tablet 40 mg  40 mg Oral Daily    carvedilol (COREG) tablet 25 mg  25 mg Oral BID With Meals    cephalexin (KEFLEX) capsule 250 mg  250 mg Oral Q12H [facility]    clopidogrel (PLAVIX) tablet 75 mg  75 mg Oral Daily    hydrALAZINE (APRESOLINE) tablet 75 mg  75 mg Oral TID    insulin glargine (LANTUS) subcutaneous injection 36 Units 0 36 mL  36 Units Subcutaneous HS    insulin lispro (HumaLOG) 100 units/mL subcutaneous injection 14 Units  14 Units Subcutaneous TID With Meals    torsemide (DEMADEX) tablet 40 mg  40 mg Oral Daily     Allergies   Allergen Reactions    Morphine     Penicillins        Objective   Vitals: Blood pressure 157/67, pulse 66, temperature 98 5 °F (36 9 °C), resp  rate 16, height 5' 4" (1 626 m), weight 117 kg (258 lb 12 8 oz), SpO2 (!) 89 %, not currently breastfeeding    Orthostatic Blood Pressures      Most Recent Value   Blood Pressure  157/67 filed at 2020 0740          No intake or output data in the 24 hours ending 20 0857    Invasive Devices     Peripheral Intravenous Line            Peripheral IV 01/07/20 Right Arm less than 1 day                Physical Exam   Constitutional: She is oriented to person, place, and time  No distress  HENT:   Mouth/Throat: No oropharyngeal exudate  Eyes: No scleral icterus  Neck: No JVD present  Cardiovascular: Normal rate and regular rhythm  Pulmonary/Chest: Effort normal and breath sounds normal  No stridor  No respiratory distress  She has no wheezes  Abdominal: Soft  Bowel sounds are normal  She exhibits no distension  There is no tenderness  There is no guarding  Musculoskeletal: She exhibits no edema  Neurological: She is alert and oriented to person, place, and time  Skin: Skin is warm and dry  She is not diaphoretic  Psychiatric: She has a normal mood and affect  Her behavior is normal        Lab Results:   I have personally reviewed pertinent lab results  CBC with diff:   Results from last 7 days   Lab Units 01/08/20  0511   WBC Thousand/uL 7 72   RBC Million/uL 3 11*   HEMOGLOBIN g/dL 8 8*   HEMATOCRIT % 28 8*   MCV fL 93   MCH pg 28 3   MCHC g/dL 30 6*   RDW % 15 8*   MPV fL 10 7   PLATELETS Thousands/uL 174     CMP:   Results from last 7 days   Lab Units 01/08/20  0511   SODIUM mmol/L 147*   POTASSIUM mmol/L 4 9   CHLORIDE mmol/L 115*   CO2 mmol/L 20*   BUN mg/dL 72*   CREATININE mg/dL 2 06*   CALCIUM mg/dL 8 5   AST U/L 15   ALT U/L 22   ALK PHOS U/L 93   EGFR ml/min/1 73sq m 25     Troponin:   0   Lab Value Date/Time    TROPONINI 0 02 01/07/2020 1243    TROPONINI <0 02 05/11/2017 1949    TROPONINI <0 02 05/11/2017 1616     BNP:   Results from last 7 days   Lab Units 01/08/20  0511   POTASSIUM mmol/L 4 9   CHLORIDE mmol/L 115*   CO2 mmol/L 20*   BUN mg/dL 72*   CREATININE mg/dL 2 06*   CALCIUM mg/dL 8 5   EGFR ml/min/1 73sq m 25     Coags:     TSH:     Magnesium:     Lipid Profile:     Imaging: I have personally reviewed pertinent films in PACS  EKG: NSR  Normal ECG         Code Status: Level 1 - Full Code  Advance Directive and Living Will:      Power of :    POLST:      Counseling / Coordination of Care  Total floor / unit time spent today 45 minutes  Greater than 50% of total time was spent with the patient and / or family counseling and / or coordination of care  A description of the counseling / coordination of care

## 2020-01-08 NOTE — ASSESSMENT & PLAN NOTE
Possibly secondary to arrhythmia given pt's description: no prescynopal symptoms  Pt was making lunch in her kitchen and then was walking to her bedroom when all she remembers is waking up on the floor    Pt denies hx of arrythmia, afib in the past- but there is mention of it as a past problem in a LVN note  Admit to telemetry  CT head  Cards consult  Echo

## 2020-01-13 ENCOUNTER — TELEPHONE (OUTPATIENT)
Dept: CARDIOLOGY CLINIC | Facility: CLINIC | Age: 65
End: 2020-01-13

## 2020-01-13 NOTE — TELEPHONE ENCOUNTER
----- Message from Arie Donnelly MD sent at 1/8/2020  3:47 PM EST -----  FU WITH ME and I ordered thirty day event monitor

## 2020-02-21 ENCOUNTER — OFFICE VISIT (OUTPATIENT)
Dept: CARDIOLOGY CLINIC | Facility: CLINIC | Age: 65
End: 2020-02-21
Payer: COMMERCIAL

## 2020-02-21 VITALS
DIASTOLIC BLOOD PRESSURE: 60 MMHG | HEART RATE: 68 BPM | WEIGHT: 258 LBS | BODY MASS INDEX: 44.05 KG/M2 | SYSTOLIC BLOOD PRESSURE: 136 MMHG | HEIGHT: 64 IN

## 2020-02-21 DIAGNOSIS — N18.4 STAGE 4 CHRONIC KIDNEY DISEASE (HCC): Primary | ICD-10-CM

## 2020-02-21 DIAGNOSIS — R55 SYNCOPE AND COLLAPSE: ICD-10-CM

## 2020-02-21 PROCEDURE — 99214 OFFICE O/P EST MOD 30 MIN: CPT | Performed by: INTERNAL MEDICINE

## 2020-02-21 NOTE — PROGRESS NOTES
Cardiology Follow Up    Baldo Glasgows  1955  [id number]  [address] [facility] [address] [phone]    1  Syncope and collapse  Ambulatory referral to Cardiology       Interval History: Followup for syncope  She has not had syncope since her hospitalization  She has a history of CAD and RON to LAD  She also has diastolic CHF  SHe is good about her sodium restriction  Problem List     Syncope and collapse    Stage 4 chronic kidney disease ([facility] [address] )    Overview Signed 1/7/2020  9:35 PM by Queenie Strickland DO     Last Assessment & Plan:   Sees Dr Seth Murillo         Obesity    Overview Signed 1/7/2020  9:35 PM by Queenie Strickland DO     Last Assessment & Plan:   bariatric medicine  Cerebral aneurysm without rupture    CHF (congestive heart failure) ([facility])    Wt Readings from Last 3 Encounters:   02/21/20 117 kg (258 lb)   01/07/20 117 kg (258 lb 12 8 oz)   11/28/17 119 kg (263 lb)                 Hypertension    Coronary artery disease    Diabetes mellitus ([facility] [address] )      Lab Results   Component Value Date    HGBA1C 7 4 (H) 01/07/2020         Lower extremity cellulitis        Past Medical History:   Diagnosis Date    Cardiac disease     CHF (congestive heart failure) ([facility] [address] )     Coronary artery disease     Diabetes mellitus ([facility] [address] [facility]     Hyperlipidemia     Hypertension     Renal disorder     TIA (transient ischemic attack)      Social History     Socioeconomic History    Marital status:       Spouse name: Not on file    Number of children: Not on file    Years of education: Not on file    Highest education level: Not on file   Occupational History    Not on file   Social Needs    Financial resource strain: Not on file    Food insecurity:     Worry: Not on file     Inability: Not on file    Transportation needs:     Medical: Not on file     Non-medical: Not on file Tobacco Use    Smoking status: Former Smoker     Years: 40 00    Smokeless tobacco: Never Used    Tobacco comment: quit    Substance and Sexual Activity    Alcohol use: Never     Frequency: Never     Binge frequency: Never     Comment: socially    Drug use: No    Sexual activity: Not on file   Lifestyle    Physical activity:     Days per week: Not on file     Minutes per session: Not on file    Stress: Not on file   Relationships    Social connections:     Talks on phone: Not on file     Gets together: Not on file     Attends Hindu service: Not on file     Active member of club or organization: Not on file     Attends meetings of clubs or organizations: Not on file     Relationship status: Not on file    Intimate partner violence:     Fear of current or ex partner: Not on file     Emotionally abused: Not on file     Physically abused: Not on file     Forced sexual activity: Not on file   Other Topics Concern    Not on file   Social History Narrative    Not on file      No family history on file    Past Surgical History:   Procedure Laterality Date    CARDIAC SURGERY       SECTION      CORONARY ANGIOPLASTY WITH STENT PLACEMENT         Current Outpatient Medications:     amLODIPine (NORVASC) 2 5 mg tablet, Take 2 5 mg by mouth daily, Disp: , Rfl:     aspirin (ECOTRIN LOW STRENGTH) 81 mg EC tablet, Take 81 mg by mouth daily, Disp: , Rfl:     atorvastatin (LIPITOR) 40 mg tablet, Take 40 mg by mouth daily, Disp: , Rfl:     carvedilol (COREG) 25 mg tablet, Take 25 mg by mouth 2 (two) times a day , Disp: , Rfl:     clopidogrel (PLAVIX) 75 mg tablet, Take 75 mg by mouth daily, Disp: , Rfl:     hydrALAZINE (APRESOLINE) 25 mg tablet, Take 25 mg by mouth 3 (three) times a day, Disp: , Rfl:     hydrALAZINE (APRESOLINE) 50 mg tablet, Take by mouth 3 (three) times a day, Disp: , Rfl:     insulin aspart (NovoLOG) 100 units/mL injection, Inject 14 Units under the skin 3 (three) times a day before meals Continue pt's home sliding scale 0-14 units with meals, Disp: , Rfl: 0    insulin glargine (Toujeo SoloStar) 300 units/mL CONCETRATED U-300 injection pen (1-unit dial), use as directed, Disp: , Rfl:     torsemide (DEMADEX) 20 mg tablet, Take 40 mg by mouth daily, Disp: , Rfl:     nitroglycerin (NITROSTAT) 0 4 mg SL tablet, Place 0 4 mg under the tongue, Disp: , Rfl:   Allergies   Allergen Reactions    Sodium Hypochlorite Other (See Comments)    Morphine     Penicillins        Labs:     Chemistry        Component Value Date/Time     03/10/2015 1503    K 4 9 01/08/2020 0511    K 4 3 03/10/2015 1503     (H) 01/08/2020 0511     03/10/2015 1503    CO2 20 (L) 01/08/2020 0511    CO2 27 03/10/2015 1503    BUN 72 (H) 01/08/2020 0511    BUN 22 03/10/2015 1503    CREATININE 2 06 (H) 01/08/2020 0511    CREATININE 0 65 03/10/2015 1503        Component Value Date/Time    CALCIUM 8 5 01/08/2020 0511    CALCIUM 8 8 03/10/2015 1503    ALKPHOS 93 01/08/2020 0511    ALKPHOS 120 (H) 03/10/2015 1503    AST 15 01/08/2020 0511    AST 13 03/10/2015 1503    ALT 22 01/08/2020 0511    ALT 28 03/10/2015 1503    BILITOT 0 50 03/10/2015 1503            No results found for: CHOL  No results found for: HDL  No results found for: LDLCALC  No results found for: TRIG  No results found for: CHOLHDL    Imaging: No results found  Review of Systems   Constitution: Negative  HENT: Negative  Eyes: Negative  Cardiovascular: Positive for dyspnea on exertion  Respiratory: Positive for shortness of breath  Endocrine: Negative  Hematologic/Lymphatic: Negative  Skin: Negative  Musculoskeletal: Negative  Gastrointestinal: Negative  Genitourinary: Negative  Neurological: Negative  Psychiatric/Behavioral: Negative  Allergic/Immunologic: Negative          Vitals:    02/21/20 0911   BP: 136/60   Pulse: 68           Physical Exam   Constitutional: She is oriented to person, place, and time  No distress  HENT:   Mouth/Throat: No oropharyngeal exudate  Eyes: No scleral icterus  Neck: No JVD present  Cardiovascular: Normal rate and regular rhythm  Exam reveals no friction rub  No murmur heard  Pulmonary/Chest: Effort normal and breath sounds normal  No stridor  No respiratory distress  She has no wheezes  Abdominal: Soft  Bowel sounds are normal  She exhibits no distension  There is no tenderness  There is no guarding  Musculoskeletal: She exhibits no edema  Neurological: She is alert and oriented to person, place, and time  Skin: Skin is warm and dry  She is not diaphoretic  Psychiatric: She has a normal mood and affect  Her behavior is normal        Discussion/Summary:    Chronic Diastolic CHF: Her weight is up 2 pounds  She will take an extra 20 mg torsemide this afternoon  Syncope: No recurrence  The event monitor did not go well  CAD: Continue with current medical therapy  She has no angina continue with current medical management  She has been on long term DAPT will need to look into this further  Check BMP and refer to nephrology for CKD  The patient was counseled regarding diagnostic results, instructions for management, risk factor reductions, impressions  total time of encounter was 25 minutes and 15 minutes was spent counseling

## 2020-05-12 ENCOUNTER — TELEPHONE (OUTPATIENT)
Dept: NEPHROLOGY | Facility: CLINIC | Age: 65
End: 2020-05-12

## 2020-05-12 ENCOUNTER — TRANSCRIBE ORDERS (OUTPATIENT)
Dept: ADMINISTRATIVE | Facility: HOSPITAL | Age: 65
End: 2020-05-12

## 2020-05-12 ENCOUNTER — APPOINTMENT (OUTPATIENT)
Dept: WOUND CARE | Facility: HOSPITAL | Age: 65
End: 2020-05-12
Payer: COMMERCIAL

## 2020-05-12 DIAGNOSIS — L97.929 IDIOPATHIC CHRONIC VENOUS HYPERTENSION OF BOTH LOWER EXTREMITIES WITH ULCER (HCC): Primary | ICD-10-CM

## 2020-05-12 DIAGNOSIS — I87.313 IDIOPATHIC CHRONIC VENOUS HYPERTENSION OF BOTH LOWER EXTREMITIES WITH ULCER (HCC): Primary | ICD-10-CM

## 2020-05-12 DIAGNOSIS — L97.919 IDIOPATHIC CHRONIC VENOUS HYPERTENSION OF BOTH LOWER EXTREMITIES WITH ULCER (HCC): Primary | ICD-10-CM

## 2020-05-12 PROCEDURE — 99213 OFFICE O/P EST LOW 20 MIN: CPT

## 2020-05-19 ENCOUNTER — OFFICE VISIT (OUTPATIENT)
Dept: WOUND CARE | Facility: HOSPITAL | Age: 65
End: 2020-05-19
Payer: COMMERCIAL

## 2020-05-19 PROCEDURE — 97597 DBRDMT OPN WND 1ST 20 CM/<: CPT

## 2020-06-02 ENCOUNTER — APPOINTMENT (OUTPATIENT)
Dept: WOUND CARE | Facility: HOSPITAL | Age: 65
End: 2020-06-02
Payer: COMMERCIAL

## 2020-06-02 PROCEDURE — 99213 OFFICE O/P EST LOW 20 MIN: CPT

## 2020-06-16 ENCOUNTER — APPOINTMENT (OUTPATIENT)
Dept: WOUND CARE | Facility: HOSPITAL | Age: 65
End: 2020-06-16
Payer: COMMERCIAL

## 2020-06-16 PROCEDURE — 99213 OFFICE O/P EST LOW 20 MIN: CPT

## 2020-06-30 ENCOUNTER — APPOINTMENT (OUTPATIENT)
Dept: WOUND CARE | Facility: HOSPITAL | Age: 65
End: 2020-06-30
Payer: COMMERCIAL

## 2020-06-30 PROCEDURE — 97597 DBRDMT OPN WND 1ST 20 CM/<: CPT

## 2020-06-30 PROCEDURE — 99213 OFFICE O/P EST LOW 20 MIN: CPT

## 2020-07-07 ENCOUNTER — APPOINTMENT (OUTPATIENT)
Dept: WOUND CARE | Facility: HOSPITAL | Age: 65
End: 2020-07-07
Payer: COMMERCIAL

## 2020-07-07 PROCEDURE — 11042 DBRDMT SUBQ TIS 1ST 20SQCM/<: CPT

## 2020-07-07 PROCEDURE — 97597 DBRDMT OPN WND 1ST 20 CM/<: CPT

## 2020-07-15 ENCOUNTER — APPOINTMENT (OUTPATIENT)
Dept: WOUND CARE | Facility: HOSPITAL | Age: 65
End: 2020-07-15
Payer: COMMERCIAL

## 2020-07-15 PROCEDURE — 99213 OFFICE O/P EST LOW 20 MIN: CPT

## 2020-07-23 ENCOUNTER — APPOINTMENT (OUTPATIENT)
Dept: WOUND CARE | Facility: HOSPITAL | Age: 65
End: 2020-07-23
Payer: COMMERCIAL

## 2020-07-23 PROCEDURE — 11042 DBRDMT SUBQ TIS 1ST 20SQCM/<: CPT

## 2020-07-30 ENCOUNTER — APPOINTMENT (OUTPATIENT)
Dept: WOUND CARE | Facility: HOSPITAL | Age: 65
End: 2020-07-30
Payer: COMMERCIAL

## 2020-07-30 PROCEDURE — 99213 OFFICE O/P EST LOW 20 MIN: CPT

## 2020-08-06 ENCOUNTER — APPOINTMENT (OUTPATIENT)
Dept: WOUND CARE | Facility: HOSPITAL | Age: 65
End: 2020-08-06
Payer: COMMERCIAL

## 2020-08-06 PROCEDURE — 97597 DBRDMT OPN WND 1ST 20 CM/<: CPT

## 2020-08-12 ENCOUNTER — OFFICE VISIT (OUTPATIENT)
Dept: WOUND CARE | Facility: HOSPITAL | Age: 65
End: 2020-08-12
Payer: COMMERCIAL

## 2020-08-12 VITALS
SYSTOLIC BLOOD PRESSURE: 138 MMHG | HEART RATE: 72 BPM | DIASTOLIC BLOOD PRESSURE: 86 MMHG | RESPIRATION RATE: 16 BRPM | TEMPERATURE: 97.9 F

## 2020-08-12 DIAGNOSIS — I87.312 IDIOPATHIC CHRONIC VENOUS HYPERTENSION OF LEFT LOWER EXTREMITY WITH ULCER (HCC): Primary | ICD-10-CM

## 2020-08-12 DIAGNOSIS — Z79.4 TYPE 2 DIABETES MELLITUS WITH CHRONIC KIDNEY DISEASE, WITH LONG-TERM CURRENT USE OF INSULIN, UNSPECIFIED CKD STAGE (HCC): ICD-10-CM

## 2020-08-12 DIAGNOSIS — E11.22 TYPE 2 DIABETES MELLITUS WITH CHRONIC KIDNEY DISEASE, WITH LONG-TERM CURRENT USE OF INSULIN, UNSPECIFIED CKD STAGE (HCC): ICD-10-CM

## 2020-08-12 DIAGNOSIS — L97.929 IDIOPATHIC CHRONIC VENOUS HYPERTENSION OF LEFT LOWER EXTREMITY WITH ULCER (HCC): Primary | ICD-10-CM

## 2020-08-12 PROCEDURE — 97597 DBRDMT OPN WND 1ST 20 CM/<: CPT | Performed by: FAMILY MEDICINE

## 2020-08-12 PROCEDURE — 99213 OFFICE O/P EST LOW 20 MIN: CPT | Performed by: FAMILY MEDICINE

## 2020-08-12 RX ORDER — LIDOCAINE HYDROCHLORIDE 40 MG/ML
5 SOLUTION TOPICAL ONCE
Status: COMPLETED | OUTPATIENT
Start: 2020-08-12 | End: 2020-08-12

## 2020-08-12 RX ADMIN — LIDOCAINE HYDROCHLORIDE 1 ML: 40 SOLUTION TOPICAL at 09:19

## 2020-08-12 NOTE — PATIENT INSTRUCTIONS
Orders Placed This Encounter   Procedures    Wound cleansing and dressings     Wash your hands with soap and water  Remove old dressing, discard into plastic bag and place in trash  Cleanse the wound with soap and water prior to applying a clean dressing  Do not use tissue or cotton balls  Do not scrub the wound  Pat dry using gauze  Shower yes   Left lower leg wound  Apply moistuizer  to skin surrounding wound and dry skin on left lower leg  Apply puracol to the left lower leg wound    Cover with dry dressing, tape  Secure with thang  Change dressing every other day  Done today    Wound compression and edema control     Spandagrip G to be worn daily

## 2020-08-12 NOTE — PROGRESS NOTES
Patient ID: Annabella Naranjo is a 60 y o  female Date of Birth 1955     Chief Complaint  Follow up LLE ulcer    Allergies  Sodium hypochlorite; Morphine; and Penicillins    Assessment:    Idiopathic chronic venous hypertension of left lower extremity with ulcer ([facility] [address] )  Change to purachol  Keep leg elevated  Spandigrip for compression      Type 2 diabetes mellitus, with long-term current use of insulin (HCC)  Low carb diet, take meds as directed  Lab Results   Component Value Date    HGBA1C 7 4 (H) 01/07/2020        Diagnoses and all orders for this visit:    Idiopathic chronic venous hypertension of left lower extremity with ulcer (HCC)  -     Wound cleansing and dressings  -     Wound compression and edema control  -     Debridement  -     lidocaine (XYLOCAINE) 4 % topical solution 5 mL    Type 2 diabetes mellitus with chronic kidney disease, with long-term current use of insulin, unspecified CKD stage ([facility] [address] )    Other orders  -     Cancel: Cast application            Debridement   Wound 04/01/20 Pretibial Left;Lateral    Consent obtained? verbal  Consent given by: patient  Risks discussed?  procedural risks discussed  Time out called at 8/12/2020 8:45 AM  Immediately prior to the procedure a time out was called  Performed by: physician  Debridement type: selective  Pain control: lidocaine 4%  Pre-debridement measurements  Length (cm): 1  Width (cm): 0 8  Depth (cm): 0 1  Surface Area (cm^2): 0 8  Volume (cm^3): 0 08    Post-debridement measurements  Length (cm): 1  Width (cm): 0 8  Depth (cm): 0 1  Percent debrided: 100%  Surface Area (cm^2): 0 8  Area debrided (cm^2): 0 8  Volume (cm^3): 0 08  Tissue and other material debrided: dermis  Devitalized tissue debrided: biofilm  Instrument(s) utilized: curette  Bleeding: small  Hemostasis obtained with: pressure  Procedural pain (0-10): 0  Post-procedural pain: 0   Response to treatment: procedure was tolerated well          Plan:     Wound 04/01/20 Pretibial Left;Lateral (Active)       Subjective: Ani Sood Pt presents for f/u venous ulcer  No new complaints  No increased pain or drainage  The following portions of the patient's history were reviewed and updated as appropriate: allergies, current medications, past family history, past medical history, past social history, past surgical history and problem list     Review of Systems   Constitutional: Negative for chills and fever  HENT: Negative for congestion and sneezing  Respiratory: Negative for cough  Cardiovascular: Positive for leg swelling  Musculoskeletal: Negative for gait problem  Skin: Positive for wound  Psychiatric/Behavioral: Negative for agitation  Objective:     Wound 04/01/20 Pretibial Left;Lateral (Active)   Wound Image Images linked 08/12/20 0825   Wound Description Granulation tissue 08/12/20 0825   Kathy-wound Assessment Intact; Hyperpigmented 08/12/20 0825   Wound Length (cm) 11 cm 08/12/20 0825   Wound Width (cm) 0 8 cm 08/12/20 0825   Wound Depth (cm) 0 1 cm 08/12/20 0825   Wound Surface Area (cm^2) 8 8 cm^2 08/12/20 0825   Wound Volume (cm^3) 0 88 cm^3 08/12/20 0825   Calculated Wound Volume (cm^3) 0 88 cm^3 08/12/20 0825   Drainage Amount Moderate 08/12/20 0825   Drainage Description Serosanguineous 08/12/20 0825   Non-staged Wound Description Full thickness 08/12/20 0825   Dressing Status Intact 08/12/20 0825         /86   Pulse 72   Temp 97 9 °F (36 6 °C)   Resp 16     Physical Exam      Wound Instructions:  Orders Placed This Encounter   Procedures    Wound cleansing and dressings     Wash your hands with soap and water  Remove old dressing, discard into plastic bag and place in trash  Cleanse the wound with soap and water prior to applying a clean dressing  Do not use tissue or cotton balls  Do not scrub the wound  Pat dry using gauze    Shower yes   Left lower leg wound  Apply moistuizer  to skin surrounding wound and dry skin on left lower leg  Apply puracol to the left lower leg wound    Cover with dry dressing, tape  Secure with thang  Change dressing every other day  Done today    Wound compression and edema control     Spandagrip G to be worn daily    Debridement     This order was created via procedure documentation

## 2020-08-12 NOTE — ASSESSMENT & PLAN NOTE
Low carb diet, take meds as directed  Lab Results   Component Value Date    HGBA1C 7 4 (H) 01/07/2020

## 2020-08-17 ENCOUNTER — OFFICE VISIT (OUTPATIENT)
Dept: WOUND CARE | Facility: HOSPITAL | Age: 65
End: 2020-08-17
Payer: COMMERCIAL

## 2020-08-17 VITALS
HEART RATE: 80 BPM | DIASTOLIC BLOOD PRESSURE: 80 MMHG | RESPIRATION RATE: 20 BRPM | SYSTOLIC BLOOD PRESSURE: 140 MMHG | TEMPERATURE: 97.5 F

## 2020-08-17 DIAGNOSIS — L97.929 IDIOPATHIC CHRONIC VENOUS HYPERTENSION OF LEFT LOWER EXTREMITY WITH ULCER (HCC): Primary | ICD-10-CM

## 2020-08-17 DIAGNOSIS — I87.312 IDIOPATHIC CHRONIC VENOUS HYPERTENSION OF LEFT LOWER EXTREMITY WITH ULCER (HCC): Primary | ICD-10-CM

## 2020-08-17 DIAGNOSIS — Z79.4 TYPE 2 DIABETES MELLITUS WITH CHRONIC KIDNEY DISEASE, WITH LONG-TERM CURRENT USE OF INSULIN, UNSPECIFIED CKD STAGE (HCC): ICD-10-CM

## 2020-08-17 DIAGNOSIS — E11.22 TYPE 2 DIABETES MELLITUS WITH CHRONIC KIDNEY DISEASE, WITH LONG-TERM CURRENT USE OF INSULIN, UNSPECIFIED CKD STAGE (HCC): ICD-10-CM

## 2020-08-17 PROCEDURE — 99213 OFFICE O/P EST LOW 20 MIN: CPT | Performed by: FAMILY MEDICINE

## 2020-08-17 PROCEDURE — 99212 OFFICE O/P EST SF 10 MIN: CPT | Performed by: FAMILY MEDICINE

## 2020-08-17 NOTE — ASSESSMENT & PLAN NOTE
Wound is closed  Discussed the importance of long term compression  I recommend use of tubigrips which she has been tolerating during wound treatment  F/u prn or call with questions/concerns

## 2020-08-17 NOTE — PATIENT INSTRUCTIONS
Orders Placed This Encounter   Procedures    Wound cleansing and dressings     Wash your hands with soap and water  Remove old dressing, discard into plastic bag and place in trash  Cleanse the wound with soap and water prior to applying a clean dressing  Do not use tissue or cotton balls  Do not scrub the wound  Pat dry using gauze  Shower yes   Apply mepilex to the wound (Keep wound covered for one week)  Spandagrip G    Tubular elastic bandage: Apply from base of toes to behind the knee  Apply in AM, may remove for sleep  Avoid prolonged standing in one place  Elevate leg(s) above the level of the heart when sitting or as much as possible  Wound is healed- call if you need anything       Standing Status:   Future     Standing Expiration Date:   8/17/2021

## 2020-08-17 NOTE — PROGRESS NOTES
Patient ID: Annabella Naranjo is a 60 y o  female Date of Birth 1955     Chief Complaint  Chief Complaint   Patient presents with    Follow Up Wound Care Visit       Allergies  Sodium hypochlorite; Morphine; and Penicillins    Assessment:    Idiopathic chronic venous hypertension of left lower extremity with ulcer ([facility] [address] )  Wound is closed  Discussed the importance of long term compression  I recommend use of tubigrips which she has been tolerating during wound treatment  F/u prn or call with questions/concerns  Diagnoses and all orders for this visit:    Idiopathic chronic venous hypertension of left lower extremity with ulcer ([address] )  -     Wound cleansing and dressings; Future    Type 2 diabetes mellitus with chronic kidney disease, with long-term current use of insulin, unspecified CKD stage ([facility] [address] )              Procedures    Plan:          Subjective: Néstor Garsia Pt presents for f/u LLE venous ulcer  No pain or drainage  The following portions of the patient's history were reviewed and updated as appropriate: She  has a past medical history of Cardiac disease, CHF (congestive heart failure) ([facility] [address] ), Coronary artery disease, Diabetes mellitus ([address] ), Hyperlipidemia, Hypertension, Renal disorder, and TIA (transient ischemic attack)  She is allergic to sodium hypochlorite; morphine; and penicillins       Review of Systems   Constitutional: Negative for chills and fever  HENT: Negative for congestion and sneezing  Respiratory: Negative for cough  Cardiovascular: Positive for leg swelling  Musculoskeletal: Negative for gait problem  Skin: Negative for wound  Psychiatric/Behavioral: Negative for agitation  Objective:         Wound 01/07/20 Neuropathic/diabetic ulcer Leg Left;Right; Lower (Active)                    /80 (BP Location: Right arm)   Pulse 80   Temp 97 5 °F (36 4 °C)   Resp 20     Physical Exam  Constitutional:       Appearance: Normal appearance  She is obese  HENT:      Head: Normocephalic and atraumatic  Right Ear: External ear normal       Left Ear: External ear normal    Eyes:      Conjunctiva/sclera: Conjunctivae normal    Neck:      Musculoskeletal: Neck supple  Pulmonary:      Effort: Pulmonary effort is normal    Skin:     Comments: Wound appears closed   Neurological:      Mental Status: She is alert  Psychiatric:         Mood and Affect: Mood normal          Behavior: Behavior normal            Wound Instructions:  Orders Placed This Encounter   Procedures    Wound cleansing and dressings     Wash your hands with soap and water  Remove old dressing, discard into plastic bag and place in trash  Cleanse the wound with soap and water prior to applying a clean dressing  Do not use tissue or cotton balls  Do not scrub the wound  Pat dry using gauze  Shower yes   Apply mepilex to the wound (Keep wound covered for one week)  Spandagrip G    Tubular elastic bandage: Apply from base of toes to behind the knee  Apply in AM, may remove for sleep  Avoid prolonged standing in one place  Elevate leg(s) above the level of the heart when sitting or as much as possible  Wound is healed- call if you need anything       Standing Status:   Future     Standing Expiration Date:   8/17/2021

## 2021-03-15 ENCOUNTER — HOSPITAL ENCOUNTER (INPATIENT)
Facility: HOSPITAL | Age: 66
LOS: 21 days | Discharge: HOME HEALTH | DRG: 698 | End: 2021-04-05
Attending: EMERGENCY MEDICINE | Admitting: INTERNAL MEDICINE
Payer: COMMERCIAL

## 2021-03-15 ENCOUNTER — APPOINTMENT (EMERGENCY)
Dept: RADIOLOGY | Facility: HOSPITAL | Age: 66
DRG: 698 | End: 2021-03-15
Payer: COMMERCIAL

## 2021-03-15 DIAGNOSIS — L03.116 CELLULITIS OF LEFT FOOT: ICD-10-CM

## 2021-03-15 DIAGNOSIS — I25.10 CORONARY ARTERY DISEASE INVOLVING NATIVE CORONARY ARTERY OF NATIVE HEART WITHOUT ANGINA PECTORIS: ICD-10-CM

## 2021-03-15 DIAGNOSIS — M79.672 PAIN OF LEFT HEEL: Primary | ICD-10-CM

## 2021-03-15 DIAGNOSIS — N18.4 STAGE 4 CHRONIC KIDNEY DISEASE (HCC): ICD-10-CM

## 2021-03-15 DIAGNOSIS — Z79.4 TYPE 2 DIABETES MELLITUS WITH HYPERGLYCEMIA, WITH LONG-TERM CURRENT USE OF INSULIN (HCC): ICD-10-CM

## 2021-03-15 DIAGNOSIS — I50.9 ACUTE ON CHRONIC CONGESTIVE HEART FAILURE, UNSPECIFIED HEART FAILURE TYPE (HCC): ICD-10-CM

## 2021-03-15 DIAGNOSIS — I50.43 ACUTE ON CHRONIC COMBINED SYSTOLIC AND DIASTOLIC CONGESTIVE HEART FAILURE (HCC): ICD-10-CM

## 2021-03-15 DIAGNOSIS — Z79.4 TYPE 2 DIABETES MELLITUS WITH STAGE 4 CHRONIC KIDNEY DISEASE, WITH LONG-TERM CURRENT USE OF INSULIN (HCC): ICD-10-CM

## 2021-03-15 DIAGNOSIS — N18.9 CKD (CHRONIC KIDNEY DISEASE): ICD-10-CM

## 2021-03-15 DIAGNOSIS — I50.9 CHF (CONGESTIVE HEART FAILURE) (HCC): ICD-10-CM

## 2021-03-15 DIAGNOSIS — E11.22 TYPE 2 DIABETES MELLITUS WITH STAGE 4 CHRONIC KIDNEY DISEASE, WITH LONG-TERM CURRENT USE OF INSULIN (HCC): ICD-10-CM

## 2021-03-15 DIAGNOSIS — I10 ESSENTIAL HYPERTENSION: ICD-10-CM

## 2021-03-15 DIAGNOSIS — E11.65 TYPE 2 DIABETES MELLITUS WITH HYPERGLYCEMIA, WITH LONG-TERM CURRENT USE OF INSULIN (HCC): ICD-10-CM

## 2021-03-15 DIAGNOSIS — N18.4 TYPE 2 DIABETES MELLITUS WITH STAGE 4 CHRONIC KIDNEY DISEASE, WITH LONG-TERM CURRENT USE OF INSULIN (HCC): ICD-10-CM

## 2021-03-15 DIAGNOSIS — R73.9 HYPERGLYCEMIA: ICD-10-CM

## 2021-03-15 DIAGNOSIS — R60.0 LOCALIZED EDEMA: ICD-10-CM

## 2021-03-15 PROBLEM — S81.809A OPEN WOUNDS INVOLVING MULTIPLE REGIONS OF LOWER EXTREMITY: Status: ACTIVE | Noted: 2021-03-15

## 2021-03-15 PROBLEM — Z91.14 NON COMPLIANCE W MEDICATION REGIMEN: Status: ACTIVE | Noted: 2021-03-15

## 2021-03-15 PROBLEM — D64.9 ANEMIA: Status: ACTIVE | Noted: 2021-03-15

## 2021-03-15 LAB
ALBUMIN SERPL BCP-MCNC: 2.3 G/DL (ref 3.5–5)
ALP SERPL-CCNC: 99 U/L (ref 46–116)
ALT SERPL W P-5'-P-CCNC: 19 U/L (ref 12–78)
ANION GAP SERPL CALCULATED.3IONS-SCNC: 12 MMOL/L (ref 4–13)
APTT PPP: 32 SECONDS (ref 23–37)
AST SERPL W P-5'-P-CCNC: 12 U/L (ref 5–45)
BASOPHILS # BLD AUTO: 0.04 THOUSANDS/ΜL (ref 0–0.1)
BASOPHILS NFR BLD AUTO: 0 % (ref 0–1)
BILIRUB SERPL-MCNC: 0.3 MG/DL (ref 0.2–1)
BUN SERPL-MCNC: 72 MG/DL (ref 5–25)
CALCIUM ALBUM COR SERPL-MCNC: 8.9 MG/DL (ref 8.3–10.1)
CALCIUM SERPL-MCNC: 7.5 MG/DL (ref 8.3–10.1)
CHLORIDE SERPL-SCNC: 113 MMOL/L (ref 100–108)
CO2 SERPL-SCNC: 18 MMOL/L (ref 21–32)
CREAT SERPL-MCNC: 3.23 MG/DL (ref 0.6–1.3)
EOSINOPHIL # BLD AUTO: 1.11 THOUSAND/ΜL (ref 0–0.61)
EOSINOPHIL NFR BLD AUTO: 10 % (ref 0–6)
ERYTHROCYTE [DISTWIDTH] IN BLOOD BY AUTOMATED COUNT: 16.2 % (ref 11.6–15.1)
GFR SERPL CREATININE-BSD FRML MDRD: 14 ML/MIN/1.73SQ M
GLUCOSE SERPL-MCNC: 202 MG/DL (ref 65–140)
HCT VFR BLD AUTO: 27 % (ref 34.8–46.1)
HGB BLD-MCNC: 8.1 G/DL (ref 11.5–15.4)
IMM GRANULOCYTES # BLD AUTO: 0.04 THOUSAND/UL (ref 0–0.2)
IMM GRANULOCYTES NFR BLD AUTO: 0 % (ref 0–2)
INR PPP: 1.23 (ref 0.84–1.19)
LYMPHOCYTES # BLD AUTO: 0.79 THOUSANDS/ΜL (ref 0.6–4.47)
LYMPHOCYTES NFR BLD AUTO: 7 % (ref 14–44)
MCH RBC QN AUTO: 27.7 PG (ref 26.8–34.3)
MCHC RBC AUTO-ENTMCNC: 30 G/DL (ref 31.4–37.4)
MCV RBC AUTO: 93 FL (ref 82–98)
MONOCYTES # BLD AUTO: 0.64 THOUSAND/ΜL (ref 0.17–1.22)
MONOCYTES NFR BLD AUTO: 6 % (ref 4–12)
NEUTROPHILS # BLD AUTO: 8.39 THOUSANDS/ΜL (ref 1.85–7.62)
NEUTS SEG NFR BLD AUTO: 77 % (ref 43–75)
NRBC BLD AUTO-RTO: 0 /100 WBCS
NT-PROBNP SERPL-MCNC: ABNORMAL PG/ML
PLATELET # BLD AUTO: 223 THOUSANDS/UL (ref 149–390)
PMV BLD AUTO: 11.1 FL (ref 8.9–12.7)
POTASSIUM SERPL-SCNC: 4.8 MMOL/L (ref 3.5–5.3)
PROT SERPL-MCNC: 6.3 G/DL (ref 6.4–8.2)
PROTHROMBIN TIME: 15.5 SECONDS (ref 11.6–14.5)
RBC # BLD AUTO: 2.92 MILLION/UL (ref 3.81–5.12)
SODIUM SERPL-SCNC: 143 MMOL/L (ref 136–145)
TROPONIN I SERPL-MCNC: <0.02 NG/ML
WBC # BLD AUTO: 11.01 THOUSAND/UL (ref 4.31–10.16)

## 2021-03-15 PROCEDURE — 36415 COLL VENOUS BLD VENIPUNCTURE: CPT | Performed by: PHYSICIAN ASSISTANT

## 2021-03-15 PROCEDURE — 82948 REAGENT STRIP/BLOOD GLUCOSE: CPT

## 2021-03-15 PROCEDURE — 83880 ASSAY OF NATRIURETIC PEPTIDE: CPT | Performed by: PHYSICIAN ASSISTANT

## 2021-03-15 PROCEDURE — 99285 EMERGENCY DEPT VISIT HI MDM: CPT

## 2021-03-15 PROCEDURE — 99223 1ST HOSP IP/OBS HIGH 75: CPT | Performed by: INTERNAL MEDICINE

## 2021-03-15 PROCEDURE — 90682 RIV4 VACC RECOMBINANT DNA IM: CPT | Performed by: INTERNAL MEDICINE

## 2021-03-15 PROCEDURE — 85025 COMPLETE CBC W/AUTO DIFF WBC: CPT | Performed by: PHYSICIAN ASSISTANT

## 2021-03-15 PROCEDURE — 87040 BLOOD CULTURE FOR BACTERIA: CPT | Performed by: PHYSICIAN ASSISTANT

## 2021-03-15 PROCEDURE — 83036 HEMOGLOBIN GLYCOSYLATED A1C: CPT | Performed by: INTERNAL MEDICINE

## 2021-03-15 PROCEDURE — 80053 COMPREHEN METABOLIC PANEL: CPT | Performed by: PHYSICIAN ASSISTANT

## 2021-03-15 PROCEDURE — 71045 X-RAY EXAM CHEST 1 VIEW: CPT

## 2021-03-15 PROCEDURE — 84484 ASSAY OF TROPONIN QUANT: CPT | Performed by: PHYSICIAN ASSISTANT

## 2021-03-15 PROCEDURE — 73630 X-RAY EXAM OF FOOT: CPT

## 2021-03-15 PROCEDURE — 93005 ELECTROCARDIOGRAM TRACING: CPT

## 2021-03-15 PROCEDURE — 85610 PROTHROMBIN TIME: CPT | Performed by: PHYSICIAN ASSISTANT

## 2021-03-15 PROCEDURE — 99285 EMERGENCY DEPT VISIT HI MDM: CPT | Performed by: PHYSICIAN ASSISTANT

## 2021-03-15 PROCEDURE — 85730 THROMBOPLASTIN TIME PARTIAL: CPT | Performed by: PHYSICIAN ASSISTANT

## 2021-03-15 PROCEDURE — 90471 IMMUNIZATION ADMIN: CPT | Performed by: INTERNAL MEDICINE

## 2021-03-15 RX ORDER — HYDRALAZINE HYDROCHLORIDE 25 MG/1
75 TABLET, FILM COATED ORAL 2 TIMES DAILY
Status: DISCONTINUED | OUTPATIENT
Start: 2021-03-15 | End: 2021-03-22

## 2021-03-15 RX ORDER — ACETAMINOPHEN 325 MG/1
650 TABLET ORAL EVERY 6 HOURS PRN
Status: DISCONTINUED | OUTPATIENT
Start: 2021-03-15 | End: 2021-04-05 | Stop reason: HOSPADM

## 2021-03-15 RX ORDER — ATORVASTATIN CALCIUM 40 MG/1
40 TABLET, FILM COATED ORAL DAILY
Status: DISCONTINUED | OUTPATIENT
Start: 2021-03-16 | End: 2021-04-05 | Stop reason: HOSPADM

## 2021-03-15 RX ORDER — ASPIRIN 81 MG/1
81 TABLET ORAL DAILY
Status: DISCONTINUED | OUTPATIENT
Start: 2021-03-16 | End: 2021-03-22

## 2021-03-15 RX ORDER — CLOPIDOGREL BISULFATE 75 MG/1
75 TABLET ORAL DAILY
Status: DISCONTINUED | OUTPATIENT
Start: 2021-03-16 | End: 2021-03-22

## 2021-03-15 RX ORDER — CEFEPIME HYDROCHLORIDE 2 G/50ML
2000 INJECTION, SOLUTION INTRAVENOUS ONCE
Status: COMPLETED | OUTPATIENT
Start: 2021-03-15 | End: 2021-03-16

## 2021-03-15 RX ORDER — CARVEDILOL 12.5 MG/1
25 TABLET ORAL 2 TIMES DAILY
Status: DISCONTINUED | OUTPATIENT
Start: 2021-03-16 | End: 2021-03-22

## 2021-03-15 RX ORDER — HEPARIN SODIUM 5000 [USP'U]/ML
5000 INJECTION, SOLUTION INTRAVENOUS; SUBCUTANEOUS EVERY 8 HOURS SCHEDULED
Status: DISCONTINUED | OUTPATIENT
Start: 2021-03-15 | End: 2021-03-26

## 2021-03-15 RX ORDER — OXYCODONE HYDROCHLORIDE 5 MG/1
2.5 TABLET ORAL ONCE
Status: CANCELLED | OUTPATIENT
Start: 2021-03-15

## 2021-03-15 RX ORDER — TORSEMIDE 20 MG/1
40 TABLET ORAL DAILY
Status: DISCONTINUED | OUTPATIENT
Start: 2021-03-16 | End: 2021-03-16

## 2021-03-15 RX ORDER — CEFAZOLIN SODIUM 1 G/50ML
1000 SOLUTION INTRAVENOUS EVERY 8 HOURS
Status: DISCONTINUED | OUTPATIENT
Start: 2021-03-16 | End: 2021-03-16

## 2021-03-15 RX ORDER — TORSEMIDE 20 MG/1
40 TABLET ORAL ONCE
Status: COMPLETED | OUTPATIENT
Start: 2021-03-15 | End: 2021-03-15

## 2021-03-15 RX ORDER — OXYCODONE HYDROCHLORIDE 5 MG/1
2.5 TABLET ORAL ONCE
Status: COMPLETED | OUTPATIENT
Start: 2021-03-15 | End: 2021-03-15

## 2021-03-15 RX ORDER — INSULIN GLARGINE 100 [IU]/ML
38 INJECTION, SOLUTION SUBCUTANEOUS EVERY MORNING
Status: DISCONTINUED | OUTPATIENT
Start: 2021-03-16 | End: 2021-03-19

## 2021-03-15 RX ADMIN — TORSEMIDE 40 MG: 20 TABLET ORAL at 22:02

## 2021-03-15 RX ADMIN — INFLUENZA A VIRUS A/HAWAII/70/2019 (H1N1) RECOMBINANT HEMAGGLUTININ ANTIGEN, INFLUENZA A VIRUS A/MINNESOTA/41/2019 (H3N2) RECOMBINANT HEMAGGLUTININ ANTIGEN, INFLUENZA B VIRUS B/WASHINGTON/02/2019 RECOMBINANT HEMAGGLUTININ ANTIGEN, AND INFLUENZA B VIRUS B/PHUKET/3073/2013 RECOMBINANT HEMAGGLUTININ ANTIGEN 0.5 ML: 45; 45; 45; 45 INJECTION INTRAMUSCULAR at 23:32

## 2021-03-15 RX ADMIN — CEFEPIME HYDROCHLORIDE 2000 MG: 2 INJECTION, SOLUTION INTRAVENOUS at 21:38

## 2021-03-15 RX ADMIN — INSULIN LISPRO 1 UNITS: 100 INJECTION, SOLUTION INTRAVENOUS; SUBCUTANEOUS at 23:29

## 2021-03-15 RX ADMIN — HEPARIN SODIUM 5000 UNITS: 5000 INJECTION INTRAVENOUS; SUBCUTANEOUS at 23:29

## 2021-03-15 RX ADMIN — HYDRALAZINE HYDROCHLORIDE 75 MG: 25 TABLET, FILM COATED ORAL at 23:38

## 2021-03-15 RX ADMIN — OXYCODONE HYDROCHLORIDE 2.5 MG: 5 TABLET ORAL at 23:31

## 2021-03-15 NOTE — ED NOTES
Pt has scabies and is non compliant with her insulin   Pt states she has not seen a dr since last year due to being afraid of angela Persaud RN  03/15/21 1914

## 2021-03-15 NOTE — ED PROVIDER NOTES
History  Chief Complaint   Patient presents with    Foot Pain     pt c/o left heel pain  pt has had pain for few days  pt also has ulcers on top of left foot  Patient is a 61 y/o F with h/o DM, CHF, CAD, HTN, Hyperlipidemia that presents to the ED with pain in left heel for a few days  Patient states she has had open wounds on both legs for a while  She was discharged from wound care last year after her wounds healed, but states since covid she has been unable to get to a doctor  She states her daughter has been taking care of her wounds  She does have a wound to her left heel, but patient states her daughter told her there were no open wounds  She has not been taking her medications since last year  She is unable to tell me why she stopped taking her medications  Patient had diapers wrapped around both legs upon arrival        History provided by:  Patient  Foot Injury - Major  Location:  Foot  Time since incident:  2 days  Injury: no    Foot location:  Sole of L foot  Pain details:     Quality:  Aching    Radiates to:  L leg    Severity:  Moderate    Onset quality:  Gradual    Timing:  Constant    Progression:  Worsening  Chronicity:  New  Tetanus status:  Unknown  Prior injury to area:  No  Relieved by:  Nothing  Worsened by:  Nothing  Ineffective treatments:  None tried  Associated symptoms: swelling    Associated symptoms: no decreased ROM, no fever and no numbness        Prior to Admission Medications   Prescriptions Last Dose Informant Patient Reported? Taking?    amLODIPine (NORVASC) 2 5 mg tablet Not Taking at Unknown time Self Yes No   Sig: Take 2 5 mg by mouth daily   aspirin (ECOTRIN LOW STRENGTH) 81 mg EC tablet 3/15/2021 at Unknown time Self Yes Yes   Sig: Take 81 mg by mouth daily   atorvastatin (LIPITOR) 40 mg tablet 3/15/2021 at Unknown time Self Yes Yes   Sig: Take 40 mg by mouth daily   carvedilol (COREG) 25 mg tablet 3/15/2021 at Unknown time Self Yes Yes   Sig: Take 25 mg by mouth 2 (two) times a day    clopidogrel (PLAVIX) 75 mg tablet 3/15/2021 at Unknown time Self Yes Yes   Sig: Take 75 mg by mouth daily   hydrALAZINE (APRESOLINE) 25 mg tablet 3/15/2021 at Unknown time Self Yes Yes   Sig: Take 25 mg by mouth 3 (three) times a day   hydrALAZINE (APRESOLINE) 50 mg tablet 3/15/2021 at Unknown time Self Yes Yes   Sig: Take by mouth 3 (three) times a day   insulin aspart (NovoLOG) 100 units/mL injection Not Taking at Unknown time Self No No   Sig: Inject 14 Units under the skin 3 (three) times a day before meals Continue pt's home sliding scale 0-14 units with meals   Patient not taking: Reported on 3/15/2021   insulin glargine (Toujeo SoloStar) 300 units/mL CONCETRATED U-300 injection pen (1-unit dial) Not Taking at Unknown time Self Yes No   Sig: use as directed   nitroglycerin (NITROSTAT) 0 4 mg SL tablet Unknown at Unknown time Self Yes No   Sig: Place 0 4 mg under the tongue   torsemide (DEMADEX) 20 mg tablet 3/15/2021 at Unknown time Self Yes Yes   Sig: Take 40 mg by mouth daily      Facility-Administered Medications: None       Past Medical History:   Diagnosis Date    Cardiac disease     [facility]     Coronary artery disease     Diabetes mellitus ([facility] )     Hyperlipidemia     Hypertension     Renal disorder     TIA (transient ischemic attack)        Past Surgical History:   Procedure Laterality Date    CARDIAC SURGERY       SECTION      CORONARY ANGIOPLASTY WITH STENT PLACEMENT         History reviewed  No pertinent family history  I have reviewed and agree with the history as documented      E-Cigarette/Vaping    E-Cigarette Use Never User      E-Cigarette/Vaping Substances    Nicotine No     THC No     CBD No     Flavoring No     Other No     Unknown No      Social History     Tobacco Use    Smoking status: Former Smoker     Years: 40 00    Smokeless tobacco: Never Used    Tobacco comment: quit    Substance Use Topics    Alcohol use: Never     Frequency: Never     Binge frequency: Never     Comment: socially    Drug use: No       Review of Systems   Constitutional: Negative for chills and fever  HENT: Negative  Respiratory: Positive for shortness of breath  Negative for cough  Cardiovascular: Positive for leg swelling  Negative for chest pain  Gastrointestinal: Negative for abdominal pain, diarrhea, nausea and vomiting  Skin: Positive for color change and wound  Neurological: Positive for weakness  Negative for dizziness and numbness  Psychiatric/Behavioral: Negative for confusion  All other systems reviewed and are negative  Physical Exam  Physical Exam  Vitals signs and nursing note reviewed  Constitutional:       General: She is not in acute distress  Appearance: She is well-developed and overweight  She is not ill-appearing or diaphoretic  HENT:      Head: Normocephalic and atraumatic  Right Ear: Hearing and external ear normal       Left Ear: Hearing and external ear normal       Nose: Nose normal    Eyes:      Conjunctiva/sclera: Conjunctivae normal       Pupils: Pupils are equal    Neck:      Musculoskeletal: Normal range of motion  Cardiovascular:      Rate and Rhythm: Normal rate and regular rhythm  Pulses:           Dorsalis pedis pulses are 1+ on the right side and 1+ on the left side  Heart sounds: Normal heart sounds  Pulmonary:      Breath sounds: Decreased breath sounds (due to poor respiratory effort  ) present  No wheezing  Abdominal:      General: Bowel sounds are normal       Palpations: Abdomen is soft  Tenderness: There is no abdominal tenderness  Hernia: A hernia is present  Hernia is present in the umbilical area  Musculoskeletal:      Right lower leg: 3+ Edema present  Left lower leg: 3+ Edema present  Comments: Swelling b/l LE with multiple open wounds and excoriations     Feet:      Left foot:      Skin integrity: Ulcer (left heel  ) and erythema present  Comments: Patient has tenderness to slightest touch of left heel  Skin:     General: Skin is warm and dry  Coloration: Skin is pale  Findings: Erythema (left foot and leg  ) and rash (multiple excoriations to body  Patient states she is a   ) present  Neurological:      Mental Status: She is alert and oriented to person, place, and time  Sensory: Sensation is intact  Psychiatric:         Mood and Affect: Mood is anxious  Speech: Speech normal          Behavior: Behavior is cooperative           Vital Signs  ED Triage Vitals [03/15/21 1908]   Temperature Pulse Respirations Blood Pressure SpO2   (!) 97 1 °F (36 2 °C) 66 18 (!) 179/71 95 %      Temp Source Heart Rate Source Patient Position - Orthostatic VS BP Location FiO2 (%)   Temporal Monitor Sitting Right arm --      Pain Score       Worst Possible Pain           Vitals:    03/15/21 1908   BP: (!) 179/71   Pulse: 66   Patient Position - Orthostatic VS: Sitting         Visual Acuity      ED Medications  Medications   torsemide (DEMADEX) tablet 40 mg (has no administration in time range)   cefepime (MAXIPIME) IVPB (premix in dextrose) 2,000 mg 50 mL (has no administration in time range)       Diagnostic Studies  Results Reviewed     Procedure Component Value Units Date/Time    NT-BNP PRO [[id number]]  (Abnormal) Collected: 03/15/21 1933    Lab Status: Final result Specimen: Blood from Arm, Right Updated: 03/15/21 2017     NT-proBNP 21,507 pg/mL     Comprehensive metabolic panel [[id number]]  (Abnormal) Collected: 03/15/21 1933    Lab Status: Final result Specimen: Blood from Arm, Right Updated: 03/15/21 2016     Sodium 143 mmol/L      Potassium 4 8 mmol/L      Chloride 113 mmol/L      CO2 18 mmol/L      ANION GAP 12 mmol/L      BUN 72 mg/dL      Creatinine 3 23 mg/dL      Glucose 202 mg/dL      Calcium 7 5 mg/dL      Corrected Calcium 8 9 mg/dL      AST 12 U/L      ALT 19 U/L      Alkaline Phosphatase 99 U/L      Total Protein 6 3 g/dL      Albumin 2 3 g/dL      Total Bilirubin 0 30 mg/dL      eGFR 14 ml/min/1 73sq m     Narrative:      [place] guidelines for Chronic Kidney Disease (CKD):     Stage 1 with normal or high GFR (GFR > 90 mL/min/1 73 square meters)    Stage 2 Mild CKD (GFR = 60-89 mL/min/1 73 square meters)    Stage 3A Moderate CKD (GFR = 45-59 mL/min/1 73 square meters)    Stage 3B Moderate CKD (GFR = 30-44 mL/min/1 73 square meters)    Stage 4 Severe CKD (GFR = 15-29 mL/min/1 73 square meters)    Stage 5 End Stage CKD (GFR <15 mL/min/1 73 square meters)  Note: GFR calculation is accurate only with a steady state creatinine    Troponin I [[id number]]  (Normal) Collected: 03/15/21 1933    Lab Status: Final result Specimen: Blood from Arm, Right Updated: 03/15/21 2009     Troponin I <0 02 ng/mL     Protime-INR [[id number]]  (Abnormal) Collected: 03/15/21 1933    Lab Status: Final result Specimen: Blood from Arm, Right Updated: 03/15/21 2006     Protime 15 5 seconds      INR 1 23    APTT [[id number]]  (Normal) Collected: 03/15/21 1933    Lab Status: Final result Specimen: Blood from Arm, Right Updated: 03/15/21 2006     PTT 32 seconds     CBC and differential [[id number]]  (Abnormal) Collected: 03/15/21 1933    Lab Status: Final result Specimen: Blood from Arm, Right Updated: 03/15/21 1949     WBC 11 01 Thousand/uL      RBC 2 92 Million/uL      Hemoglobin 8 1 g/dL      Hematocrit 27 0 %      MCV 93 fL      MCH 27 7 pg      MCHC 30 0 g/dL      RDW 16 2 %      MPV 11 1 fL      Platelets 061 Thousands/uL      nRBC 0 /100 WBCs      Neutrophils Relative 77 %      Immat GRANS % 0 %      Lymphocytes Relative 7 %      Monocytes Relative 6 %      Eosinophils Relative 10 %      Basophils Relative 0 %      Neutrophils Absolute 8 39 Thousands/µL      Immature Grans Absolute 0 04 Thousand/uL      Lymphocytes Absolute 0 79 Thousands/µL      Monocytes Absolute 0 64 Thousand/µL Eosinophils Absolute 1 11 Thousand/µL      Basophils Absolute 0 04 Thousands/µL     Blood culture #1 [[id number]] Collected: 03/15/21 1933    Lab Status: In process Specimen: Blood from Arm, Left Updated: 03/15/21 1944    Blood culture #2 [[id number]] Collected: 03/15/21 1933    Lab Status: In process Specimen: Blood from Arm, Right Updated: 03/15/21 1944    UA w Reflex to Microscopic w Reflex to Culture [[id number]]     Lab Status: No result Specimen: Urine, Clean Catch                  XR foot 3+ views LEFT   ED Interpretation by Melvin Barbour PA-C (03/15 2019)   Calcaneal spur, no acute fracture  XR chest 1 view portable    (Results Pending)              Procedures  ECG 12 Lead Documentation Only    Date/Time: 3/15/2021 9:25 PM  Performed by: Melvin Barbour PA-C  Authorized by: Melvin Barbour PA-C     Indications / Diagnosis:  Weakness  ECG reviewed by me, the ED Provider: yes    Patient location:  ED  Previous ECG:     Previous ECG:  Compared to current    Similarity:  No change  Rate:     ECG rate:  67  Rhythm:     Rhythm: sinus rhythm    Conduction:     Conduction: normal    ST segments:     ST segments:  Normal  T waves:     T waves: normal               ED Course                         Initial Sepsis Screening     Row Name 03/15/21 2022                Is the patient's history suggestive of a new or worsening infection? (!) Yes (Proceed)  -CD        Suspected source of infection  soft tissue  -CD        Are two or more of the following signs & symptoms of infection both present and new to the patient?   No  -CD        Indicate SIRS criteria  --        If the answer is yes to both questions, suspicion of sepsis is present  --        If severe sepsis is present AND tissue hypoperfusion perists in the hour after fluid resuscitation or lactate > 4, the patient meets criteria for SEPTIC SHOCK  --        Are any of the following organ dysfunction criteria present within 6 hours of suspected infection and SIRS criteria that are NOT considered to be chronic conditions? --        Organ dysfunction  --        Date of presentation of severe sepsis  --        Time of presentation of severe sepsis  --        Tissue hypoperfusion persists in the hour after crystalloid fluid administration, evidenced, by either:  --        Was hypotension present within one hour of the conclusion of crystalloid fluid administration?  --        Date of presentation of septic shock  --        Time of presentation of septic shock  --          User Key  (r) = Recorded By, (t) = Taken By, (c) = Cosigned By    Initials Name Provider Type    RADHA Lopes PA-C Physician Assistant          SBIRT 22yo+      Most Recent Value   SBIRT (23 yo +)   In order to provide better care to our patients, we are screening all of our patients for alcohol and drug use  Would it be okay to ask you these screening questions? No Filed at: 03/15/2021 2007                    MDM  Number of Diagnoses or Management Options  Cellulitis of left foot: new and requires workup  CHF (congestive heart failure) ([facility] ): established and worsening  CKD (chronic kidney disease): established and worsening  Hyperglycemia: established and worsening  Pain of left heel: new and requires workup  Diagnosis management comments: Patient with left heel pain, found to have multiple ulcers to both legs, cellulitis to left leg  She also has not been taking meds and is SOB and has pitting edema B/L, will check labs, to r/o cardiopulmonary disease, electrolyte abnormality  She does have hyperglycemia-due to noncompliance of medications  CKD worsening  BNP over 20,000, will restart demadex  Will start abx for cellulitis          Amount and/or Complexity of Data Reviewed  Clinical lab tests: ordered and reviewed  Tests in the radiology section of CPT®: ordered and reviewed  Discuss the patient with other providers: yes    Patient Progress  Patient progress: stable      Disposition  Final diagnoses:   Pain of left heel   Cellulitis of left foot   CHF (congestive heart failure) ([facility])   Hyperglycemia   CKD (chronic kidney disease)     Time reflects when diagnosis was documented in both MDM as applicable and the Disposition within this note     Time User Action Codes Description Comment    3/15/2021  9:07 PM Kimberly Finders Add [F07 574] Pain of left heel     3/15/2021  9:07 PM Kimberly Finders Add [L03 116] Cellulitis of left foot     3/15/2021  9:07 PM Kimberly Finders Add [I50 9] CHF (congestive heart failure) ([facility] )     3/15/2021  9:07 PM Kimberly Finders Add [R73 9] Hyperglycemia     3/15/2021  9:08 PM Kimberly Finders Add [N18 9] CKD (chronic kidney disease)       ED Disposition     ED Disposition Condition Date/Time Comment    Admit Stable Mon Mar 15, 2021  9:07 PM Case was discussed with Sarah Harper and the patient's admission status was agreed to be Admission Status: inpatient status to the service of Dr Aris Sanchez   Follow-up Information    None         Patient's Medications   Discharge Prescriptions    No medications on file     No discharge procedures on file      PDMP Review     None          ED Provider  Electronically Signed by           John Graham PA-C  03/15/21 2127

## 2021-03-16 ENCOUNTER — APPOINTMENT (INPATIENT)
Dept: RADIOLOGY | Facility: HOSPITAL | Age: 66
DRG: 698 | End: 2021-03-16
Payer: COMMERCIAL

## 2021-03-16 ENCOUNTER — APPOINTMENT (INPATIENT)
Dept: NON INVASIVE DIAGNOSTICS | Facility: HOSPITAL | Age: 66
DRG: 698 | End: 2021-03-16
Payer: COMMERCIAL

## 2021-03-16 PROBLEM — N17.9 ACUTE KIDNEY INJURY SUPERIMPOSED ON CHRONIC KIDNEY DISEASE (HCC): Status: ACTIVE | Noted: 2021-03-16

## 2021-03-16 PROBLEM — E87.2 INCREASED ANION GAP METABOLIC ACIDOSIS: Status: ACTIVE | Noted: 2021-03-16

## 2021-03-16 PROBLEM — L03.115 CELLULITIS OF BOTH LOWER EXTREMITIES: Status: ACTIVE | Noted: 2021-03-15

## 2021-03-16 PROBLEM — N18.9 ACUTE KIDNEY INJURY SUPERIMPOSED ON CHRONIC KIDNEY DISEASE (HCC): Status: ACTIVE | Noted: 2021-03-16

## 2021-03-16 PROBLEM — L97.529 FOOT ULCER, LEFT (HCC): Status: ACTIVE | Noted: 2021-03-16

## 2021-03-16 PROBLEM — L03.116 CELLULITIS OF BOTH LOWER EXTREMITIES: Status: ACTIVE | Noted: 2021-03-15

## 2021-03-16 PROBLEM — E87.5 HYPERKALEMIA: Status: ACTIVE | Noted: 2021-03-16

## 2021-03-16 LAB
ANION GAP SERPL CALCULATED.3IONS-SCNC: 13 MMOL/L (ref 4–13)
ANION GAP SERPL CALCULATED.3IONS-SCNC: 17 MMOL/L (ref 4–13)
ATRIAL RATE: 67 BPM
BACTERIA UR QL AUTO: ABNORMAL /HPF
BASOPHILS # BLD AUTO: 0.03 THOUSANDS/ΜL (ref 0–0.1)
BASOPHILS NFR BLD AUTO: 0 % (ref 0–1)
BILIRUB UR QL STRIP: NEGATIVE
BUN SERPL-MCNC: 69 MG/DL (ref 5–25)
BUN SERPL-MCNC: 71 MG/DL (ref 5–25)
CALCIUM SERPL-MCNC: 7.5 MG/DL (ref 8.3–10.1)
CALCIUM SERPL-MCNC: 7.5 MG/DL (ref 8.3–10.1)
CHLORIDE SERPL-SCNC: 114 MMOL/L (ref 100–108)
CHLORIDE SERPL-SCNC: 114 MMOL/L (ref 100–108)
CLARITY UR: CLEAR
CO2 SERPL-SCNC: 14 MMOL/L (ref 21–32)
CO2 SERPL-SCNC: 16 MMOL/L (ref 21–32)
COLOR UR: YELLOW
CREAT SERPL-MCNC: 3.31 MG/DL (ref 0.6–1.3)
CREAT SERPL-MCNC: 3.33 MG/DL (ref 0.6–1.3)
EOSINOPHIL # BLD AUTO: 0.27 THOUSAND/ΜL (ref 0–0.61)
EOSINOPHIL NFR BLD AUTO: 2 % (ref 0–6)
ERYTHROCYTE [DISTWIDTH] IN BLOOD BY AUTOMATED COUNT: 16.3 % (ref 11.6–15.1)
EST. AVERAGE GLUCOSE BLD GHB EST-MCNC: 171 MG/DL
FINE GRAN CASTS URNS QL MICRO: ABNORMAL /LPF
GFR SERPL CREATININE-BSD FRML MDRD: 14 ML/MIN/1.73SQ M
GFR SERPL CREATININE-BSD FRML MDRD: 14 ML/MIN/1.73SQ M
GLUCOSE SERPL-MCNC: 103 MG/DL (ref 65–140)
GLUCOSE SERPL-MCNC: 111 MG/DL (ref 65–140)
GLUCOSE SERPL-MCNC: 123 MG/DL (ref 65–140)
GLUCOSE SERPL-MCNC: 148 MG/DL (ref 65–140)
GLUCOSE SERPL-MCNC: 153 MG/DL (ref 65–140)
GLUCOSE SERPL-MCNC: 155 MG/DL (ref 65–140)
GLUCOSE UR STRIP-MCNC: ABNORMAL MG/DL
HBA1C MFR BLD: 7.6 %
HCT VFR BLD AUTO: 28.2 % (ref 34.8–46.1)
HGB BLD-MCNC: 8.3 G/DL (ref 11.5–15.4)
HGB UR QL STRIP.AUTO: NEGATIVE
HYALINE CASTS #/AREA URNS LPF: ABNORMAL /LPF
IMM GRANULOCYTES # BLD AUTO: 0.06 THOUSAND/UL (ref 0–0.2)
IMM GRANULOCYTES NFR BLD AUTO: 1 % (ref 0–2)
KETONES UR STRIP-MCNC: NEGATIVE MG/DL
LEUKOCYTE ESTERASE UR QL STRIP: NEGATIVE
LYMPHOCYTES # BLD AUTO: 0.62 THOUSANDS/ΜL (ref 0.6–4.47)
LYMPHOCYTES NFR BLD AUTO: 5 % (ref 14–44)
MCH RBC QN AUTO: 27.5 PG (ref 26.8–34.3)
MCHC RBC AUTO-ENTMCNC: 29.4 G/DL (ref 31.4–37.4)
MCV RBC AUTO: 93 FL (ref 82–98)
MONOCYTES # BLD AUTO: 0.48 THOUSAND/ΜL (ref 0.17–1.22)
MONOCYTES NFR BLD AUTO: 4 % (ref 4–12)
MUCOUS THREADS UR QL AUTO: ABNORMAL
NEUTROPHILS # BLD AUTO: 10.98 THOUSANDS/ΜL (ref 1.85–7.62)
NEUTS SEG NFR BLD AUTO: 88 % (ref 43–75)
NITRITE UR QL STRIP: NEGATIVE
NON-SQ EPI CELLS URNS QL MICRO: ABNORMAL /HPF
NRBC BLD AUTO-RTO: 0 /100 WBCS
P AXIS: 74 DEGREES
PH UR STRIP.AUTO: 5 [PH]
PLATELET # BLD AUTO: 204 THOUSANDS/UL (ref 149–390)
PLATELET # BLD AUTO: 229 THOUSANDS/UL (ref 149–390)
PMV BLD AUTO: 10.7 FL (ref 8.9–12.7)
PMV BLD AUTO: 10.8 FL (ref 8.9–12.7)
POTASSIUM SERPL-SCNC: 5 MMOL/L (ref 3.5–5.3)
POTASSIUM SERPL-SCNC: 5.1 MMOL/L (ref 3.5–5.3)
POTASSIUM SERPL-SCNC: 5.5 MMOL/L (ref 3.5–5.3)
PR INTERVAL: 162 MS
PROT UR STRIP-MCNC: >=300 MG/DL
QRS AXIS: 0 DEGREES
QRSD INTERVAL: 84 MS
QT INTERVAL: 460 MS
QTC INTERVAL: 486 MS
RBC # BLD AUTO: 3.02 MILLION/UL (ref 3.81–5.12)
RBC #/AREA URNS AUTO: ABNORMAL /HPF
SODIUM SERPL-SCNC: 143 MMOL/L (ref 136–145)
SODIUM SERPL-SCNC: 145 MMOL/L (ref 136–145)
SP GR UR STRIP.AUTO: 1.02 (ref 1–1.03)
T WAVE AXIS: 73 DEGREES
UROBILINOGEN UR QL STRIP.AUTO: 0.2 E.U./DL
VENTRICULAR RATE: 67 BPM
WBC # BLD AUTO: 12.44 THOUSAND/UL (ref 4.31–10.16)
WBC #/AREA URNS AUTO: ABNORMAL /HPF

## 2021-03-16 PROCEDURE — 80048 BASIC METABOLIC PNL TOTAL CA: CPT | Performed by: NURSE PRACTITIONER

## 2021-03-16 PROCEDURE — 97163 PT EVAL HIGH COMPLEX 45 MIN: CPT

## 2021-03-16 PROCEDURE — 97167 OT EVAL HIGH COMPLEX 60 MIN: CPT

## 2021-03-16 PROCEDURE — 85025 COMPLETE CBC W/AUTO DIFF WBC: CPT | Performed by: INTERNAL MEDICINE

## 2021-03-16 PROCEDURE — 82948 REAGENT STRIP/BLOOD GLUCOSE: CPT

## 2021-03-16 PROCEDURE — 84132 ASSAY OF SERUM POTASSIUM: CPT | Performed by: INTERNAL MEDICINE

## 2021-03-16 PROCEDURE — 81001 URINALYSIS AUTO W/SCOPE: CPT | Performed by: INTERNAL MEDICINE

## 2021-03-16 PROCEDURE — 93306 TTE W/DOPPLER COMPLETE: CPT | Performed by: INTERNAL MEDICINE

## 2021-03-16 PROCEDURE — 93010 ELECTROCARDIOGRAM REPORT: CPT | Performed by: INTERNAL MEDICINE

## 2021-03-16 PROCEDURE — 87086 URINE CULTURE/COLONY COUNT: CPT | Performed by: INTERNAL MEDICINE

## 2021-03-16 PROCEDURE — 99232 SBSQ HOSP IP/OBS MODERATE 35: CPT | Performed by: INTERNAL MEDICINE

## 2021-03-16 PROCEDURE — 80048 BASIC METABOLIC PNL TOTAL CA: CPT | Performed by: INTERNAL MEDICINE

## 2021-03-16 PROCEDURE — 85049 AUTOMATED PLATELET COUNT: CPT | Performed by: INTERNAL MEDICINE

## 2021-03-16 PROCEDURE — 99255 IP/OBS CONSLTJ NEW/EST HI 80: CPT | Performed by: INTERNAL MEDICINE

## 2021-03-16 PROCEDURE — 93306 TTE W/DOPPLER COMPLETE: CPT

## 2021-03-16 RX ORDER — PERMETHRIN 50 MG/G
CREAM TOPICAL ONCE
Status: DISCONTINUED | OUTPATIENT
Start: 2021-03-16 | End: 2021-03-16

## 2021-03-16 RX ORDER — PERMETHRIN 50 MG/G
CREAM TOPICAL ONCE
Status: COMPLETED | OUTPATIENT
Start: 2021-03-16 | End: 2021-03-16

## 2021-03-16 RX ORDER — CEFEPIME HYDROCHLORIDE 2 G/50ML
2000 INJECTION, SOLUTION INTRAVENOUS EVERY 24 HOURS
Status: DISCONTINUED | OUTPATIENT
Start: 2021-03-16 | End: 2021-03-21

## 2021-03-16 RX ORDER — CALCIUM GLUCONATE 20 MG/ML
1 INJECTION, SOLUTION INTRAVENOUS ONCE
Status: COMPLETED | OUTPATIENT
Start: 2021-03-16 | End: 2021-03-16

## 2021-03-16 RX ORDER — DEXTROSE MONOHYDRATE 25 G/50ML
50 INJECTION, SOLUTION INTRAVENOUS ONCE
Status: COMPLETED | OUTPATIENT
Start: 2021-03-16 | End: 2021-03-16

## 2021-03-16 RX ORDER — ALBUMIN (HUMAN) 12.5 G/50ML
25 SOLUTION INTRAVENOUS ONCE
Status: COMPLETED | OUTPATIENT
Start: 2021-03-16 | End: 2021-03-16

## 2021-03-16 RX ORDER — SODIUM POLYSTYRENE SULFONATE 4.1 MEQ/G
15 POWDER, FOR SUSPENSION ORAL; RECTAL ONCE
Status: COMPLETED | OUTPATIENT
Start: 2021-03-16 | End: 2021-03-16

## 2021-03-16 RX ORDER — SODIUM BICARBONATE 650 MG/1
1300 TABLET ORAL
Status: DISCONTINUED | OUTPATIENT
Start: 2021-03-16 | End: 2021-03-19

## 2021-03-16 RX ORDER — FUROSEMIDE 10 MG/ML
80 INJECTION INTRAMUSCULAR; INTRAVENOUS ONCE
Status: COMPLETED | OUTPATIENT
Start: 2021-03-16 | End: 2021-03-16

## 2021-03-16 RX ADMIN — VANCOMYCIN HYDROCHLORIDE 1250 MG: 1 INJECTION, POWDER, LYOPHILIZED, FOR SOLUTION INTRAVENOUS at 15:49

## 2021-03-16 RX ADMIN — HEPARIN SODIUM 5000 UNITS: 5000 INJECTION INTRAVENOUS; SUBCUTANEOUS at 16:49

## 2021-03-16 RX ADMIN — SODIUM POLYSTYRENE SULFONATE 15 G: 4.1 POWDER, FOR SUSPENSION ORAL; RECTAL at 06:25

## 2021-03-16 RX ADMIN — CARVEDILOL 25 MG: 12.5 TABLET, FILM COATED ORAL at 18:01

## 2021-03-16 RX ADMIN — TORSEMIDE 40 MG: 20 TABLET ORAL at 10:47

## 2021-03-16 RX ADMIN — ASPIRIN 81 MG: 81 TABLET, COATED ORAL at 10:48

## 2021-03-16 RX ADMIN — INSULIN GLARGINE 38 UNITS: 100 INJECTION, SOLUTION SUBCUTANEOUS at 10:50

## 2021-03-16 RX ADMIN — CEFEPIME HYDROCHLORIDE 2000 MG: 2 INJECTION, SOLUTION INTRAVENOUS at 22:09

## 2021-03-16 RX ADMIN — ATORVASTATIN CALCIUM 40 MG: 40 TABLET, FILM COATED ORAL at 10:47

## 2021-03-16 RX ADMIN — CALCIUM GLUCONATE 1 G: 20 INJECTION, SOLUTION INTRAVENOUS at 06:26

## 2021-03-16 RX ADMIN — HEPARIN SODIUM 5000 UNITS: 5000 INJECTION INTRAVENOUS; SUBCUTANEOUS at 22:09

## 2021-03-16 RX ADMIN — HEPARIN SODIUM 5000 UNITS: 5000 INJECTION INTRAVENOUS; SUBCUTANEOUS at 05:13

## 2021-03-16 RX ADMIN — PERMETHRIN: 50 CREAM TOPICAL at 11:59

## 2021-03-16 RX ADMIN — INSULIN HUMAN 10 UNITS: 100 INJECTION, SOLUTION PARENTERAL at 06:25

## 2021-03-16 RX ADMIN — SODIUM BICARBONATE 650 MG TABLET 1300 MG: at 18:01

## 2021-03-16 RX ADMIN — DEXTROSE MONOHYDRATE 50 ML: 500 INJECTION PARENTERAL at 06:25

## 2021-03-16 RX ADMIN — CLOPIDOGREL BISULFATE 75 MG: 75 TABLET ORAL at 10:48

## 2021-03-16 RX ADMIN — CARVEDILOL 25 MG: 12.5 TABLET, FILM COATED ORAL at 10:47

## 2021-03-16 RX ADMIN — ALBUMIN (HUMAN) 25 G: 0.25 INJECTION, SOLUTION INTRAVENOUS at 17:31

## 2021-03-16 RX ADMIN — SODIUM BICARBONATE 650 MG TABLET 1300 MG: at 16:46

## 2021-03-16 RX ADMIN — HYDRALAZINE HYDROCHLORIDE 75 MG: 25 TABLET, FILM COATED ORAL at 22:08

## 2021-03-16 RX ADMIN — FUROSEMIDE 80 MG: 10 INJECTION, SOLUTION INTRAMUSCULAR; INTRAVENOUS at 17:17

## 2021-03-16 RX ADMIN — CEFAZOLIN SODIUM 1000 MG: 1 SOLUTION INTRAVENOUS at 05:16

## 2021-03-16 RX ADMIN — HYDRALAZINE HYDROCHLORIDE 75 MG: 25 TABLET, FILM COATED ORAL at 10:54

## 2021-03-16 NOTE — PLAN OF CARE
Problem: OCCUPATIONAL THERAPY ADULT  Goal: Performs self-care activities at highest level of function for planned discharge setting  See evaluation for individualized goals  Description: Treatment Interventions: ADL retraining, Compensatory technique education, Endurance training, Functional transfer training, Equipment evaluation/education, Activityengagement          See flowsheet documentation for full assessment, interventions and recommendations  Note: Limitation: Decreased ADL status, Decreased self-care trans, Decreased high-level ADLs  Prognosis: Good  Assessment: Pt is a 60 y o  female seen for OT evaluation at [facility], admitted 3/15/2021 w/ Acute on chronic congestive heart failure ([facility] )  OT completed extensive review of pt's medical and social history  Comorbidities affecting pt's functional performance at time of assessment include: hyperglycemia, CKD, left foot cellulitis, class 3 obesity, essential HTN, etc (see chart for additional hx)   Personal factors affecting pt at time of IE include:difficulty performing ADLS, difficulty performing IADLS  and decreased functional mobiltiy  Prior to admission, pt was living with daughter in house with 1st floor set-up  Pt required occasional assist for ADLs  Required use of cane PTA  Upon evaluation: Pt requires mod A X 2 for bed mobility, min A x 1 for functional mobility/transfers, sup for UB ADLs and mod -max A for LB ADLS 2* the following deficits impacting occupational performance: weakness, decreased balance, decreased tolerance and nausea  Pt to benefit from continued skilled OT tx while in the hospital to address deficits as defined above and maximize level of functional independence w ADL's and functional mobility  Occupational Performance areas to address include: bathing/shower, toilet hygiene, dressing and functional mobility  Based on findings, pt is of high complexity   The patient's raw score on the AM-PAC Daily Activity inpatient short form is 19, standardized score is 40 22, greater than 39 4  Patients at this level are likely to benefit from DC to home  Please refer to the recommendation of the Occupational Therapist for safe DC planning  At this time, OT recommendations at time of discharge are home OT/home with family support        OT Discharge Recommendation: Home with skilled therapy(home with family support)

## 2021-03-16 NOTE — CASE MANAGEMENT
LOS: 1 day  Pt is not a documented bundle  Pt is not a 30 day readmission  Unplanned readmission score is 21 and green  Met with Pt  Pt presents AA&Ox3  Pt's dtr at bedside  Discussed role of , discharge planning, identifying help at home and discharge preference  Pt reports she and her dtr live in 1st floor apartment, 2 steps to enter into building and 5-6 steps to enter into apartment  Pt reports she is independent with adls and ambulation  Pt reports her PCP is Dr Lavell Montoya  Pt reports she does not have POA or living will and declines information  Pt reports she uses [facility] pharmacy in [facility], has prescription plan and is able to afford medications  Pt cannot say why she is non compliant with her medications  Pt reports she is employed and works from home  Pt reports her dtr drives, prepares meals and goes grocery shopping  Pt denies hx of VNA and SNF  Pt denies hx of mental health and drug and alcohol treatment  Pt reports her dtr will help her at home as needed  Pt reports her dtr will transport her home upon discharge  CM informed Pt of VNA/home PT/OT recommendation for discharge  Pt in agreement  Freedom of Choice given  Referral sent to [facility] via [facility]  Await determination  CM to follow

## 2021-03-16 NOTE — PLAN OF CARE
Problem: PHYSICAL THERAPY ADULT  Goal: Performs mobility at highest level of function for planned discharge setting  See evaluation for individualized goals  Description: Treatment/Interventions: ADL retraining, Functional transfer training, LE strengthening/ROM, Therapeutic exercise, Endurance training, Gait training, Equipment eval/education, Spoke to nursing, Spoke to case management, OT  Equipment Recommended: Nikki Gong       See flowsheet documentation for full assessment, interventions and recommendations  Outcome: Progressing  Note: Prognosis: Good  Problem List: Decreased strength, Decreased endurance, Impaired balance, Decreased mobility, Decreased coordination, Decreased skin integrity, Pain, Obesity  Assessment: Pt presents with decreased mobility ,strength adn activity tolerance related to adm for CHF , skin issues and B les pain/edema  Able to [facility] e to recliner with assist and RW  Numerous dry heaves during session  Can benefit form skiledTP serivc esto regain safe ind funciotnal mobility adn encoruage increased activity to decreased edema B les  Will follow see goals  Barriers to Discharge: (medical clearance)     PT Discharge Recommendation: Home with skilled therapy     PT - OK to Discharge: Yes    See flowsheet documentation for full assessment

## 2021-03-16 NOTE — ASSESSMENT & PLAN NOTE
· Patient initially came into the ED with diapers wrapped around both of her legs and reported left heel pain over the last few days  Patient noted to have open wounds on both right and left leg with signs of cellulitis  · about 1 year ago patient reports she had been seeing wound care but stopped due to her wounds improving and fear of jennifer COVID-19  · White blood cells slightly elevated at 11, afebrile  · Blood cultures pending  · ED started patient on Cefepime    Start Ancef  · Consult wound care

## 2021-03-16 NOTE — ASSESSMENT & PLAN NOTE
Wt Readings from Last 3 Encounters:   03/15/21 113 kg (250 lb)   02/21/20 117 kg (258 lb)   01/07/20 117 kg (258 lb 12 8 oz)     · Patient does not appear to be fluid overload  · Patient stopped taking home torsemide about 1 week ago due to trouble with ambulation     · In the ED given torsemide p o  40 mg  · Restart torsemide 40 mg daily and continue carvedilol 25 mg b i d  and Plavix 75 mg daily  · BNP 21,507 on admission  · Last echo 01/08/2020:  EF 55%  · Repeat echo  · Place patient on telemetry  · Consult Cardiology

## 2021-03-16 NOTE — PLAN OF CARE
Problem: Potential for Falls  Goal: Patient will remain free of falls  Description: INTERVENTIONS:  - Assess patient frequently for physical needs  -  Identify cognitive and physical deficits and behaviors that affect risk of falls    -  [place] fall precautions as indicated by assessment   - Educate patient/family on patient safety including physical limitations  - Instruct patient to call for assistance with activity based on assessment  - Modify environment to reduce risk of injury  - Consider OT/PT consult to assist with strengthening/mobility  Outcome: Progressing     Problem: PAIN - ADULT  Goal: Verbalizes/displays adequate comfort level or baseline comfort level  Description: Interventions:  - Encourage patient to monitor pain and request assistance  - Assess pain using appropriate pain scale  - Administer analgesics based on type and severity of pain and evaluate response  - Implement non-pharmacological measures as appropriate and evaluate response  - Consider cultural and social influences on pain and pain management  - Notify physician/advanced practitioner if interventions unsuccessful or patient reports new pain  Outcome: Progressing     Problem: INFECTION - ADULT  Goal: Absence or prevention of progression during hospitalization  Description: INTERVENTIONS:  - Assess and monitor for signs and symptoms of infection  - Monitor lab/diagnostic results  - Monitor all insertion sites, i e  indwelling lines, tubes, and drains  - Monitor endotracheal if appropriate and nasal secretions for changes in amount and color  - [place] appropriate cooling/warming therapies per order  - Administer medications as ordered  - Instruct and encourage patient and family to use good hand hygiene technique  - Identify and instruct in appropriate isolation precautions for identified infection/condition  Outcome: Progressing  Goal: Absence of fever/infection during neutropenic period  Description: INTERVENTIONS:  - Monitor WBC    Outcome: Progressing     Problem: SAFETY ADULT  Goal: Patient will remain free of falls  Description: INTERVENTIONS:  - Assess patient frequently for physical needs  -  Identify cognitive and physical deficits and behaviors that affect risk of falls    -  [place] fall precautions as indicated by assessment   - Educate patient/family on patient safety including physical limitations  - Instruct patient to call for assistance with activity based on assessment  - Modify environment to reduce risk of injury  - Consider OT/PT consult to assist with strengthening/mobility  Outcome: Progressing  Goal: Maintain or return to baseline ADL function  Description: INTERVENTIONS:  -  Assess patient's ability to carry out ADLs; assess patient's baseline for ADL function and identify physical deficits which impact ability to perform ADLs (bathing, care of mouth/teeth, toileting, grooming, dressing, etc )  - Assess/evaluate cause of self-care deficits   - Assess range of motion  - Assess patient's mobility; develop plan if impaired  - Assess patient's need for assistive devices and provide as appropriate  - Encourage maximum independence but intervene and supervise when necessary  - Involve family in performance of ADLs  - Assess for home care needs following discharge   - Consider OT consult to assist with ADL evaluation and planning for discharge  - Provide patient education as appropriate  Outcome: Progressing  Goal: Maintain or return mobility status to optimal level  Description: INTERVENTIONS:  - Assess patient's baseline mobility status (ambulation, transfers, stairs, etc )    - Identify cognitive and physical deficits and behaviors that affect mobility  - Identify mobility aids required to assist with transfers and/or ambulation (gait belt, sit-to-stand, lift, walker, cane, etc )  - [place] fall precautions as indicated by assessment  - Record patient progress and toleration of activity level on Mobility SBAR; progress patient to next Phase/Stage  - Instruct patient to call for assistance with activity based on assessment  - Consider rehabilitation consult to assist with strengthening/weightbearing, etc   Outcome: Progressing     Problem: DISCHARGE PLANNING  Goal: Discharge to home or other facility with appropriate resources  Description: INTERVENTIONS:  - Identify barriers to discharge w/patient and caregiver  - Arrange for needed discharge resources and transportation as appropriate  - Identify discharge learning needs (meds, wound care, etc )  - Arrange for interpretive services to assist at discharge as needed  - Refer to Case Management Department for coordinating discharge planning if the patient needs post-hospital services based on physician/advanced practitioner order or complex needs related to functional status, cognitive ability, or social support system  Outcome: Progressing     Problem: Knowledge Deficit  Goal: Patient/family/caregiver demonstrates understanding of disease process, treatment plan, medications, and discharge instructions  Description: Complete learning assessment and assess knowledge base    Interventions:  - Provide teaching at level of understanding  - Provide teaching via preferred learning methods  Outcome: Progressing     Problem: CARDIOVASCULAR - ADULT  Goal: Maintains optimal cardiac output and hemodynamic stability  Description: INTERVENTIONS:  - Monitor I/O, vital signs and rhythm  - Monitor for S/S and trends of decreased cardiac output  - Administer and titrate ordered vasoactive medications to optimize hemodynamic stability  - Assess quality of pulses, skin color and temperature  - Assess for signs of decreased coronary artery perfusion  - Instruct patient to report change in severity of symptoms  Outcome: Progressing  Goal: Absence of cardiac dysrhythmias or at baseline rhythm  Description: INTERVENTIONS:  - Continuous cardiac monitoring, vital signs, obtain 12 lead EKG if ordered  - Administer antiarrhythmic and heart rate control medications as ordered  - Monitor electrolytes and administer replacement therapy as ordered  Outcome: Progressing     Problem: SKIN/TISSUE INTEGRITY - ADULT  Goal: Skin integrity remains intact  Description: INTERVENTIONS  - Identify patients at risk for skin breakdown  - Assess and monitor skin integrity  - Assess and monitor nutrition and hydration status  - Monitor labs (i e  albumin)  - Assess for incontinence   - Turn and reposition patient  - Assist with mobility/ambulation  - Relieve pressure over bony prominences  - Avoid friction and shearing  - Provide appropriate hygiene as needed including keeping skin clean and dry  - Evaluate need for skin moisturizer/barrier cream  - Collaborate with interdisciplinary team (i e  Nutrition, Rehabilitation, etc )   - Patient/family teaching  Outcome: Progressing  Goal: Incision(s), wounds(s) or drain site(s) healing without S/S of infection  Description: INTERVENTIONS  - Assess and document risk factors for skin impairment   - Assess and document dressing, incision, wound bed, drain sites and surrounding tissue  - Consider nutrition services referral as needed  - Oral mucous membranes remain intact  - Provide patient/ family education  Outcome: Progressing  Goal: Oral mucous membranes remain intact  Description: INTERVENTIONS  - Assess oral mucosa and hygiene practices  - Implement preventative oral hygiene regimen  - Implement oral medicated treatments as ordered  - Initiate Nutrition services referral as needed  Outcome: Progressing

## 2021-03-16 NOTE — UTILIZATION REVIEW
Initial Clinical Review    Admission: Date/Time/Statement:   Admission Orders (From admission, onward)     Ordered        03/15/21 2108  Inpatient Admission  Once                   Orders Placed This Encounter   Procedures    Inpatient Admission     Standing Status:   Standing     Number of Occurrences:   1     Order Specific Question:   Level of Care     Answer:   Med Surg [16]     Order Specific Question:   Estimated length of stay     Answer:   More than 2 Midnights     Order Specific Question:   Certification     Answer:   I certify that inpatient services are medically necessary for this patient for a duration of greater than two midnights  See H&P and MD Progress Notes for additional information about the patient's course of treatment  ED Arrival Information     Expected Arrival Acuity Means of Arrival Escorted By Service Admission Type    - 3/15/2021 19:06 Urgent Ambulance [facility]) Hospitalist Urgent    Arrival Complaint    foot pain left        Chief Complaint   Patient presents with    Foot Pain     pt c/o left heel pain  pt has had pain for few days  pt also has ulcers on top of left foot  HPI:      60 y o  female with past medical history of CHF,DM2 on insulin, CKD, HTN, obesity and cellulitis, who presents with left heel pain that has been present for about a week  Patient is found to have an ulcer on the left heel, as well as multiple open wounds on bilateral lower extremities with signs of cellulitis  Patient also reports that she stopped taking her insulin around Thanksgiving and has not been taking her blood sugars  Patient reports compliance with all other medications except for her torsemide that she stop taking about 1 week ago  She stopped taking her torsemide because she has had increased difficulty with ambulation due to the pain in her legs and did not want the frequent trips to the bathroom caused by the torsemide   ED labs revealed elevated BNP, mild leukocytosis,  creatinine above baseline (2 06) and glucose 202  Physical exam: 3+ B/L LE edema, multiple open wounds and excoriations, ulcer on heel of left foot  Plan: Inpatient Med Surg admission for evaluation and treatment of acute on chronic CHF, cellulitis B/L LE, left foot ulcer and ADI:  Telemetry, restart torsemide,  consult Cardiology  IV antibiotics, follow blood cultures, consult  Podiatry and Wound Care  Monitor creatinine, consult Nephrology  Restart insulin, check A1c      3/16 Internal Medicine: Potassium 5 5 on a m  BMP  Give  50 mL of dextrose, 1 g calcium gluconate, 10 units or regular insulin, and 15 g of kayexalate  Continue to keep patient on telemetry  Recheck potassium  Nephrology consult:  Acute kidney injury (POA) on chronic kidney disease, stage IV vs progression of chronic kidney disease, stage IV: Etiology, suspect secondary to cardiorenal syndrome, underlying cellulitis, ATN  Creatinine 1 9- 2 1 mg/dL in 2019, 2 1 mg/dL in 2020  Creatinine 3 33 today  Stop oral diuretics  Give Albumin 25 g and furosemide 80 mg IV now  Check renal US, PVR with bladder scan  No indication for emergent renal replacement therapy  Avoid NSAIDS, nephrotoxic agents, IV contrast  Bicarb 14 and anion gap of 17, start sodium bicarb tablets  Internal Medicine: continue IV antibiotics, monitor renal function, trend H&H, continue Lantus with Humalog sliding scale  Podiatry consult pending          ED Triage Vitals [03/15/21 1908]   Temperature Pulse Respirations Blood Pressure SpO2   (!) 97 1 °F (36 2 °C) 66 18 (!) 179/71 95 %      Temp Source Heart Rate Source Patient Position - Orthostatic VS BP Location FiO2 (%)   Temporal Monitor Sitting Right arm --      Pain Score       Worst Possible Pain          Wt Readings from Last 1 Encounters:   03/16/21 (!) 262 kg (578 lb 0 7 oz)     Additional Vital Signs:     Date/Time  Temp  Pulse  Resp  BP  MAP (mmHg)  SpO2  Calculated FIO2 (%) - Nasal Cannula  Nasal Cannula O2 Flow Rate (L/min)  O2 Device    03/16/21 08:30:58  98 °F (36 7 °C)  71  17  138/58  85  97 %  --  --  --    03/16/21 0016  97 5 °F (36 4 °C)  101  18  155/95  --  --  28  2 L/min  Nasal cannula    03/15/21 23:37:20  --  101  --  155/95  115  97 %  --  --  --    03/15/21 23:34:51  --  82  --  152/104  120  96 %  --  --  --    03/15/21 22:31:01  97 7 °F (36 5 °C)  98  18  112/58  76  90 %  --  --  --          Pertinent Labs/Diagnostic Test Results:     3/16 US kidneys and bladder: Normal    3/16 ECHO:      LEFT VENTRICLE:  Systolic function was at the lower limits of normal  Ejection fraction was estimated in the range of 45 % to 50 %  There was hypokinesis of the mid-apical inferior and apical septal wall(s)  Wall thickness was at the upper limits of normal  Features were consistent with a pseudonormal left ventricular filling pattern, with concomitant abnormal relaxation and increased filling pressure (grade 2 diastolic dysfunction)      LEFT ATRIUM: The atrium was mildly dilated      MITRAL VALVE: There was mild annular calcification  There was mild-moderate diffuse thickening  There was mild regurgitation      AORTIC VALVE: The valve was trileaflet  Leaflets exhibited mildly to moderately increased thickness and moderately reduced cuspal separation  There was mild to moderate stenosis  Valve mean gradient was 15 mmHg  Estimated aortic valve area (by VTI) was 1 52 cmï¾²  Estimated aortic valve area (by Vmax) was 1 56 cmï¾²     TRICUSPID VALVE: There was mild regurgitation        3/15 x-ray left foot: No acute osseous abnormality  Calcaneal spurring  3/15 CXR: No acute cardiopulmonary disease       3/15 EKG:      Normal sinus rhythm with sinus arrhythmia  Poor R wave progression  Abnormal ECG  When compared with ECG of 07-JAN-2020 12:42,  No significant change was found          Results from last 7 days   Lab Units 03/16/21  0522 03/15/21  1933   WBC Thousand/uL 12 44* 11 01*   HEMOGLOBIN g/dL 8  3* 8 1*   HEMATOCRIT % 28 2* 27 0*   PLATELETS Thousands/uL 229 223   NEUTROS ABS Thousands/µL 10 98* 8 39*         Results from last 7 days   Lab Units 03/16/21  0522 03/15/21  1933   SODIUM mmol/L 145 143   POTASSIUM mmol/L 5 5* 4 8   CHLORIDE mmol/L 114* 113*   CO2 mmol/L 14* 18*   ANION GAP mmol/L 17* 12   BUN mg/dL 69* 72*   CREATININE mg/dL 3 33* 3 23*   EGFR ml/min/1 73sq m 14 14   CALCIUM mg/dL 7 5* 7 5*     Results from last 7 days   Lab Units 03/15/21  1933   AST U/L 12   ALT U/L 19   ALK PHOS U/L 99   TOTAL PROTEIN g/dL 6 3*   ALBUMIN g/dL 2 3*   TOTAL BILIRUBIN mg/dL 0 30     Results from last 7 days   Lab Units 03/16/21  0828 03/15/21  2328   POC GLUCOSE mg/dl 155* 153*     Results from last 7 days   Lab Units 03/16/21  0522 03/15/21  1933   GLUCOSE RANDOM mg/dL 148* 202*         Results from last 7 days   Lab Units 03/15/21  1933   HEMOGLOBIN A1C % 7 6*   EAG mg/dl 171         Results from last 7 days   Lab Units 03/15/21  1933   TROPONIN I ng/mL <0 02         Results from last 7 days   Lab Units 03/15/21  1933   PROTIME seconds 15 5*   INR  1 23*   PTT seconds 32         Results from last 7 days   Lab Units 03/15/21  1933   NT-PRO BNP pg/mL 21,507*           Results from last 7 days   Lab Units 03/16/21  0522   CLARITY UA  Clear   COLOR UA  Yellow   SPEC GRAV UA  1 025   PH UA  5 0   GLUCOSE UA mg/dl 250 (1/4%)*   KETONES UA mg/dl Negative   BLOOD UA  Negative   PROTEIN UA mg/dl >=300*   NITRITE UA  Negative   BILIRUBIN UA  Negative   UROBILINOGEN UA E U /dl 0 2   LEUKOCYTES UA  Negative   WBC UA /hpf 10-20*   RBC UA /hpf None Seen   BACTERIA UA /hpf Moderate*   EPITHELIAL CELLS WET PREP /hpf Occasional   MUCUS THREADS  Occasional*         Results from last 7 days   Lab Units 03/15/21  1933   BLOOD CULTURE  Received in Microbiology Lab  Culture in Progress  Received in Microbiology Lab  Culture in Progress                 ED Treatment:   Medication Administration from 03/15/2021 1906 to 03/15/2021 2223       Date/Time Order Dose Route Action     03/15/2021 2202 torsemide (DEMADEX) tablet 40 mg 40 mg Oral Given     03/15/2021 2138 cefepime (MAXIPIME) IVPB (premix in dextrose) 2,000 mg 50 mL 2,000 mg Intravenous New Bag        Past Medical History:   Diagnosis Date    Cardiac disease     [facility]cong[facility])     Coronary artery disease     [facility] mellitus ([facility] [address] )     Hyperlipidemia     Hypertension     Renal disorder     TIA (transient ischemic attack)      Present on Admission:   Acute on chronic congestive heart failure ([facility])      Admitting Diagnosis: CHF (congestive heart failure) ([facility]) [I50 9]  Hyperglycemia [R73 9]  CKD (chronic kidney disease) [N18 9]  Foot pain, left [M79 672]  Cellulitis of left foot [L03 116]  Pain of left heel [M79 672]  Age/Sex: 60 y o  female       Admission Orders:      Telemetry, PT/OT, ECHO      Scheduled Medications:    aspirin, 81 mg, Oral, Daily  atorvastatin, 40 mg, Oral, Daily  carvedilol, 25 mg, Oral, BID  cefazolin, 1,000 mg, Intravenous, Q8H  clopidogrel, 75 mg, Oral, Daily  heparin (porcine), 5,000 Units, Subcutaneous, Q8H MAL  hydrALAZINE, 75 mg, Oral, BID  insulin glargine, 38 Units, Subcutaneous, QAM  insulin lispro, 1-6 Units, Subcutaneous, TID AC  insulin lispro, 1-6 Units, Subcutaneous, HS  permethrin, , Topical, Once  torsemide, 40 mg, Oral, Daily      albumin human (FLEXBUMIN) 25 % injection 25 g   Dose: 25 g  Freq: Once Route: IV  Start: 03/16/21 1215    furosemide (LASIX) injection 80 mg   Dose: 80 mg  Freq: Once Route: IV  Start: 03/16/21 1215    vancomycin (VANCOCIN) 1,250 mg in sodium chloride 0 9 % 250 mL IVPB   Dose: 15 mg/kg  Weight Dosing Info: 80 4 kg (Adjusted)  Freq:  Once Route: IV  Start: 03/16/21 1330    cefepime (MAXIPIME) IVPB (premix in dextrose) 2,000 mg 50 mL   Dose: 2,000 mg  Freq: Every 24 hours Route: IV  Start: 03/16/21 2100    ceFAZolin (ANCEF) IVPB (premix in dextrose) 1,000 mg 50 mL   Dose: 1,000 mg  Freq: Every 8 hours Route: IV  Last Dose: 1,000 mg (03/16/21 0516)  Start: 03/16/21 0600 End: 03/16/21 1314    Continuous IV Infusions: None  PRN Meds:    acetaminophen, 650 mg, Oral, Q6H PRN        IP CONSULT TO NUTRITION SERVICES  IP CONSULT TO PODIATRY  IP CONSULT TO CARDIOLOGY  IP CONSULT TO NEPHROLOGY                Network Utilization Review Department  ATTENTION: Please call with any questions or concerns to [phone] and carefully listen to the prompts so that you are directed to the right person  All voicemails are confidential   Martin Kimball all requests for admission clinical reviews, approved or denied determinations and any other requests to dedicated fax number below belonging to the campus where the patient is receiving treatment   List of dedicated fax numbers for the Facilities:  [address] DENIALS (Administrative/Medical Necessity) [phone]   [address] (Maternity/NICU/Pediatrics) [phone] [address] ([facility]) [address] [place] [address] [address] [place] [address] [phone]

## 2021-03-16 NOTE — ASSESSMENT & PLAN NOTE
· Blood pressure stable  · Continue home carvedilol 25 mg b i d , hydralazine 75 mg b i d   · Continue to monitor blood pressure per unit protocol

## 2021-03-16 NOTE — ASSESSMENT & PLAN NOTE
Lab Results   Component Value Date    EGFR 14 03/16/2021    EGFR 14 03/16/2021    EGFR 14 03/15/2021    CREATININE 3 31 (H) 03/16/2021    CREATININE 3 33 (H) 03/16/2021    CREATININE 3 23 (H) 03/15/2021     · Creatinine 3 23 on admission   · Creatinine around 2 06 1 year ago  · Consult Nephrology - appreciate recommendations  · Plan for IV albumin x1 in addition to 80 mg IV Lasix x1 - may have cardiorenal component given noncompliance with torsemide  · Obtain ultrasound kidney and bladder  · Trend BMP

## 2021-03-16 NOTE — OCCUPATIONAL THERAPY NOTE
Occupational Therapy Evaluation     Patient Name: Annabella Naranjo  UNUPM'W Date: 3/16/2021  Problem List  Principal Problem:    Acute on chronic congestive heart failure ([facility] [address] )  Active Problems:    Class 3 severe obesity with body mass index (BMI) of 45 0 to 49 9 in [facility])    Essential hypertension    Type 2 diabetes mellitus with hyperglycemia, with long-term current use of insulin (HCC)    Anemia    Cellulitis of both lower extremities    Non compliance w medication regimen    Acute kidney injury superimposed on chronic kidney disease (HCC)    Foot ulcer, left (HCC)    Increased anion gap metabolic acidosis    Past Medical History  Past Medical History:   Diagnosis Date    Cardiac disease     [facility]     Coronary artery disease     Diabetes mellitus ([facility] )     Hyperlipidemia     Hypertension     Renal disorder     TIA (transient ischemic attack)      Past Surgical History  Past Surgical History:   Procedure Laterality Date    CARDIAC SURGERY       SECTION      CORONARY ANGIOPLASTY WITH STENT PLACEMENT             21 1359   OT Last Visit   OT Visit Date 21   Note Type   Note type Evaluation   Restrictions/Precautions   Weight Bearing Precautions Per Order No   Other Precautions Contact/isolation; Fall Risk   Pain Assessment   Pain Assessment Tool Barrett-Baker FACES   Barrett-Baker FACES Pain Rating 4   Pain Location/Orientation Orientation: Bilateral  (Feet)   Effect of Pain on Daily Activities movement/touch   Hospital Pain Intervention(s) Repositioned; Emotional support; Ambulation/increased activity   Home Living   Type of [address] [facility] One level   Bathroom Shower/Tub Tub/shower unit  (Pt typicall sponge bathes)   Irma Anguiano   Additional Comments Per dante pt sleeps in recliner   Prior Function   Lives With Daughter   Receives Help From Family   ADL Assistance Needs assistance  (Occasional assist with LB dressing)   IADLs Needs assistance   Vocational Retired   Subjective   Subjective Pt received in semisupine position  Pt agreeable to session   ADL   Eating Assistance 5  Supervision/Setup   Eating Deficit Setup   Grooming Assistance 5  Supervision/Setup   Grooming Deficit Setup   UB Bathing Assistance 5  Supervision/Setup   LB Bathing Assistance 3  Moderate Assistance   UB Dressing Assistance 5  Supervision/Setup   LB Dressing Assistance 2  Maximal Assistance   Toileting Assistance  3  Moderate Assistance   Bed Mobility   Supine to Sit 3  Moderate assistance   Additional items Assist x 2   Additional Comments Pt remained seated in recliner by end of session   Transfers   Sit to Stand 4  Minimal assistance   Additional items Assist x 1;Verbal cues; Increased time required   Stand to Sit 5  Supervision   Additional items Armrests   Stand pivot 4  Minimal assistance   Additional items Assist x 1;Verbal cues  (RW)   Balance   Static Sitting Normal   Dynamic Sitting Good   Static Standing Fair +   Dynamic Standing Fair   Activity Tolerance   Activity Tolerance Patient limited by fatigue   Medical Staff Made Aware PT Poly   Nurse Made Aware XIOMY COCHRAN Assessment   RUE Assessment WFL   LUE Assessment   LUE Assessment WFL   Cognition   Overall Cognitive Status WFL   Arousal/Participation Alert   Attention Within functional limits   Orientation Level Oriented X4   Memory Within functional limits   Following Commands Follows one step commands without difficulty   Assessment   Limitation Decreased ADL status; Decreased self-care trans;Decreased high-level ADLs   Prognosis Good   Assessment Pt is a 60 y o  female seen for OT evaluation at [facility], admitted 3/15/2021 w/ Acute on chronic congestive heart failure ([facility] [address] )  OT completed extensive review of pt's medical and social history   Comorbidities affecting pt's functional performance at time of assessment include: hyperglycemia, CKD, left foot cellulitis, class 3 obesity, essential HTN, etc (see chart for additional hx)   Personal factors affecting pt at time of IE include:difficulty performing ADLS, difficulty performing IADLS  and decreased functional mobiltiy  Prior to admission, pt was living with daughter in house with 1st floor set-up  Pt required occasional assist for ADLs  Required use of cane PTA  Upon evaluation: Pt requires mod A X 2 for bed mobility, min A x 1 for functional mobility/transfers, sup for UB ADLs and mod -max A for LB ADLS 2* the following deficits impacting occupational performance: weakness, decreased balance, decreased tolerance and nausea  Pt to benefit from continued skilled OT tx while in the hospital to address deficits as defined above and maximize level of functional independence w ADL's and functional mobility  Occupational Performance areas to address include: bathing/shower, toilet hygiene, dressing and functional mobility  Based on findings, pt is of high complexity  The patient's raw score on the AM-PAC Daily Activity inpatient short form is 19, standardized score is 40 22, greater than 39 4  Patients at this level are likely to benefit from DC to home  Please refer to the recommendation of the Occupational Therapist for safe DC planning  At this time, OT recommendations at time of discharge are home OT/home with family support  Goals   Patient Goals Pt wishses to get better   Plan   Treatment Interventions ADL retraining; Compensatory technique education; Endurance training;Functional transfer training;Equipment evaluation/education; Activityengagement   Goal Expiration Date 03/26/21   OT Treatment Day 0   Recommendation   OT Discharge Recommendation Home with skilled therapy  (home with family support)   AM-[facility] Daily Activity Inpatient   Lower Body Dressing 2   Bathing 3   Toileting 2   Upper Body Dressing 4   Grooming 4   Eating 4   Daily Activity Raw Score 19   Daily Activity Standardized Score (Calc for Raw Score >=11) 40 22   AM-PAC Applied Cognition Inpatient   Following a Speech/Presentation 4   Understanding Ordinary Conversation 4   Taking Medications 4   Remembering Where Things Are Placed or Put Away 4   Remembering List of 4-5 Errands 4   Taking Care of Complicated Tasks 3   Applied Cognition Raw Score 23   Applied Cognition Standardized Score 53 08       Pt will achieve the following goals within 10 days  *Pt will complete UB bathing and dressing with mod I     *Pt will complete LB bathing and dressing with mod I      * Pt will complete toileting w/ mod I w/ G hygiene/thoroughness using DME PRN    *Pt will perform functional transfers with on/off all surfaces with mod I using DME as needed w/ G balance/safety  *Pt will increase standing tolerance to 5 minutes in order to complete sinkside ADL task  *Pt will identify 3-5 fall risks during ADL routine to ensure home safety upon discharge  *Pt will improve functional mobility during ADL/IADL/leisure tasks to mod I using DME as needed w/ G balance/safety       *Assess DME needs       Heladio Orantes, OTR/L

## 2021-03-16 NOTE — PHYSICAL THERAPY NOTE
PT eval    03/16/21 1400   PT Last Visit   PT Visit Date 03/16/21   Note Type   Note type Evaluation   Pain Assessment   Pain Assessment Tool Barrett-Baker FACES   Barrett-Baker FACES Pain Rating 4   Pain Location/Orientation Orientation: Left;Orientation: Right;Location: Foot   Home Living   Type of [address]   Home Equipment Walker;Cane  (sleeps in recliner)   Prior Function   Level of [place] Needs assistance with IADLs; Needs assistance with ADLs and functional mobility   Lives With Daughter   Receives Help From Family   ADL Assistance Independent   IADLs Needs assistance   Falls in the last 6 months 0   Vocational Retired   Restrictions/Precautions   [facility] Bearing Precautions Per Order No   Other Precautions Pain;Contact/isolation  (decresased skin integrity)   General   Additional Pertinent History adm with CHF, Bles wounds, edema, r/o scabies, N+V    Family/Caregiver Present Yes   Cognition   Overall Cognitive Status WFL   Orientation Level Oriented X4   Memory Within functional limits   Following Commands Follows one step commands without difficulty   RUE Assessment   RUE Assessment WFL   LUE Assessment   LUE Assessment WFL   RLE Assessment   RLE Assessment   (stregnth 3-/5 aarom wlf to 90/90 ankleDF to neutral)   LLE Assessment   LLE Assessment   (aarom wfl strength 3-/5)   Coordination   Movements are Fluid and Coordinated 0   Coordination and Movement Description bradykinetic   Sensation   (hypersensative Bles)   Proprioception   RLE Proprioception Grossly intact   LLE Proprioception Grossly Intact   Bed Mobility   Supine to Sit 3  Moderate assistance   Additional items Assist x 2  (NB pt sleeps in recliner at home)   Transfers   Sit to Stand 4  Minimal assistance   Additional items Assist x 1; Increased time required;Verbal cues   Stand to Sit 5  Supervision   Stand pivot 4  Minimal assistance   Additional items Assist x 1; Increased time required;Verbal cues;Armrests  (rw) Ambulation/Elevation   Gait pattern Wide JARAD; Forward Flexion; Inconsistent paulette; Step to;Short stride   Gait Assistance 4  Minimal assist   Additional items Assist x 1;Verbal cues; Tactile cues   Assistive Device Rolling walker   Distance 5'   Balance   Static Sitting Normal   Dynamic Sitting Good   Static Standing Fair +   Dynamic Standing Fair   Ambulatory Fair   Endurance Deficit   Endurance Deficit Yes   Endurance Deficit Description tires quickly adn dry heaves throughout session   Activity Tolerance   Activity Tolerance Patient limited by fatigue   Medical Staff Made Aware OT Aspen   Nurse Made Aware Rn Elisa   Assessment   Prognosis Good   Problem List Decreased strength;Decreased endurance; Impaired balance;Decreased mobility; Decreased coordination;Decreased skin integrity;Pain;Obesity   Assessment Pt presents with decreased mobility ,strength adn activity tolerance related to adm for CHF , skin issues and B les pain/edema  Able to [facility] e to recliner with assist and RW  Numerous dry heaves during session  Can benefit form skiledTP serivc esto regain safe ind funciotnal mobility adn encoruage increased activity to decreased edema B les  Will follow see goals   Barriers to Discharge   (medical clearance)   Goals 1) safe ind transfers 2) safe ind amb with rw 100' level 3) improve standing tolerance to 5 min 4) up/down 5 steps with rail and sba   Patient Goals get better   Plan   Treatment/Interventions ADL retraining;Functional transfer training;LE strengthening/ROM; Therapeutic exercise; Endurance training;Gait training;Equipment eval/education;Spoke to nursing;Spoke to case management;OT   PT Frequency 2-3x/wk   Recommendation   PT Discharge Recommendation Home with skilled therapy   Equipment Recommended [address] [facility] Recommended Wheeled walker   PT - OK to Discharge Yes   Additional Comments   (with medical clearance)   AM-PAC Basic Mobility Inpatient   Turning in Bed Without Bedrails 2   Lying on Back to Sitting on Edge of Flat Bed 2   Moving Bed to Chair 2   Standing Up From Chair 3   Walk in Room 3   Climb 3-5 Stairs 1   Basic Mobility Inpatient Raw Score 13   Basic Mobility Standardized Score 33 99   Modified [place] Scale   Modified [place] Scale 3   Poly Lenz, PT

## 2021-03-16 NOTE — ASSESSMENT & PLAN NOTE
· Patient with ulcer on left heel of the foot  · Patient is unaware when ulcer started      · Patient reports increased pain with touch and walking over the past week  · Patient with poor compliance of diabetes insulin  · Podiatry and Wound Care consulted

## 2021-03-16 NOTE — PROGRESS NOTES
Alejandro Davis       Progress Note - Flori Gallegos 1955, 60 y o  female MRN: [id number]  Unit/Bed#: -01 Encounter: [id number]  Primary Care Provider: Rian Figueroa DO   Date and time admitted to hospital: 3/15/2021  7:09 PM    * Acute on chronic congestive heart failure ([address] )  Assessment & Plan  Wt Readings from Last 3 Encounters:   03/16/21 119 kg (261 lb 14 4 oz)   02/21/20 117 kg (258 lb)   01/07/20 117 kg (258 lb 12 8 oz)     · Noted to have lower extremity edema  · Patient stopped taking home torsemide about 1 week ago due to trouble with ambulation     · In the ED given torsemide p o  40 mg  · Originally reordered for torsemide 40 mg daily, discussed with Nephrology, will administer IV albumin x1 in addition to 80 mg IV Lasix x1  · BNP 21,507 on admission  · Last echo 01/08/2020:  EF 55%  · Repeat echo pending  · Place patient on telemetry  · Daily weights  · I/O  · Low-sodium diet  · Nephrology consulted - appreciate recommendations    Acute kidney injury superimposed on chronic kidney disease [facility])  Assessment & Plan  Lab Results   Component Value Date    EGFR 14 03/16/2021    EGFR 14 03/16/2021    EGFR 14 03/15/2021    CREATININE 3 31 (H) 03/16/2021    CREATININE 3 33 (H) 03/16/2021    CREATININE 3 23 (H) 03/15/2021     · Creatinine 3 23 on admission   · Creatinine around 2 06 1 year ago  · Consult Nephrology - appreciate recommendations  · Plan for IV albumin x1 in addition to 80 mg IV Lasix x1 - may have cardiorenal component given noncompliance with torsemide  · Obtain ultrasound kidney and bladder  · Trend BMP    Foot ulcer, left (HCC)  Assessment & Plan  · Patient with ulcer on left heel of the foot  · Patient is unaware when ulcer started but notes the rash on her lower extremities began about 6 weeks ago  · Patient reports increased pain with touch and walking over the past week  · Patient with poor compliance of diabetes insulin  · Continue IV cefepime/vancomycin  · XR left foot completed on 03/15 with calcaneal spurring, no acute osseous abnormality  · Podiatry consult    Increased anion gap metabolic acidosis  Assessment & Plan  · Likely due to worsening renal function  · Appreciate nephrology input - start sodium bicarbonate tablets  · Continue to trend BMP    Non compliance w medication regimen  Assessment & Plan  · Patient reports she stopped taking her insulin around Thanksgiving 2020, her torsemide about 1 week ago and has not been seeing wound care for her legs,   · Patient had been seeing wound care for wounds of her bilateral lower extremities but stopped going due to the fear of jennifer Covid-19  · consult case management    Cellulitis of both lower extremities  Assessment & Plan  · Patient initially came into the ED with diapers wrapped around both of her legs and reported left heel pain over the last few days  Patient noted to have open wounds on both right and left leg with signs of cellulitis  · About 1 year ago patient reports she had been seeing wound care but stopped due to her wounds improving and fear of jennifer COVID-19  · Leukocytosis 12 K  · Blood cultures pending  · ED started patient on Cefepime and originally had been transitioned to Ancef, will broadened to cefepime/vancomycin due to diabetic ulcer with drainage  · Consult podiatry      Anemia  Assessment & Plan  · Hemoglobin 8 3  · No signs of active bleeding  · Baseline from about 1 year ago about 9  · Continue to monitor    Type 2 diabetes mellitus with hyperglycemia, with long-term current use of insulin [facility])  Assessment & Plan  Lab Results   Component Value Date    HGBA1C 7 6 (H) 03/15/2021       Recent Labs     03/15/21  2328 03/16/21  0828 03/16/21  1106   POCGLU 153* 155* 111       Blood Sugar Average: Last 72 hrs:  · (P) 423 [id number] with blood sugar of 202 on admission  · Patient stopped taking insulin around Thanksgiving 2020    At that time patient was taking insulin glargine 38 units in the morning and sliding scale insulin with meals  · Restart insulin glargine 38 units in the morning  · Hemoglobin A1c 7 6  · SSI plus Accu-Chek  · Diabetic diet    Essential hypertension  Assessment & Plan  · Blood pressure stable  · Continue home carvedilol 25 mg b i d , hydralazine 75 mg b i d   · Continue to monitor blood pressure per unit protocol    Class 3 severe obesity with body mass index (BMI) of 45 0 to 49 9 in adult [facility])  Assessment & Plan  Body mass index is 44 96 kg/m²  · Encourage lifestyle changes  · Consult nutrition    VTE Pharmacologic Prophylaxis:   Pharmacologic: Heparin  Mechanical VTE Prophylaxis in Place: No    Patient Centered Rounds: I have performed bedside rounds with nursing staff today  Discussions with Specialists or Other Care Team Provider: Nursing, CM, Attending, Nephrology    Education and Discussions with Family / Patient:  Discussed with patient directly at bedside  Declined family update  Time Spent for Care: 30 minutes  More than 50% of total time spent on counseling and coordination of care as described above  Current Length of Stay: 1 day(s)    Current Patient Status: Inpatient   Certification Statement: The patient will continue to require additional inpatient hospital stay due to Diuresis, acute kidney injury on CKD, left foot ulcer, cellulitis    Discharge Plan:  Pending clinical course    Code Status: Level 1 - Full Code      Subjective:   Patient states that she feels much improved this morning  Feels that her left foot pain has improved  Denies chest pain/palpitations, shortness of breath, nausea vomiting, abdominal pain      Objective:     Vitals:   Temp (24hrs), Av 6 °F (36 4 °C), Min:97 1 °F (36 2 °C), Max:98 °F (36 7 °C)    Temp:  [97 1 °F (36 2 °C)-98 °F (36 7 °C)] 98 °F (36 7 °C)  HR:  [] 71  Resp:  [17-18] 17  BP: (112-179)/() 138/58  SpO2:  [90 %-97 %] 93 %  Body mass index is 44 96 kg/m²  Input and Output Summary (last 24 hours): Intake/Output Summary (Last 24 hours) at 3/16/2021 1409  Last data filed at 3/16/2021 0701  Gross per 24 hour   Intake 300 ml   Output --   Net 300 ml       Physical Exam:     Physical Exam  Vitals signs and nursing note reviewed  Constitutional:       Appearance: Normal appearance  Comments: Appears comfortable, no acute distress   HENT:      Head: Normocephalic  Eyes:      General: No scleral icterus  Extraocular Movements: Extraocular movements intact  Conjunctiva/sclera: Conjunctivae normal    Neck:      Musculoskeletal: Normal range of motion  Cardiovascular:      Rate and Rhythm: Normal rate and regular rhythm  Heart sounds: S1 normal and S2 normal    Pulmonary:      Effort: Pulmonary effort is normal       Breath sounds: Normal breath sounds  No wheezing, rhonchi or rales  Abdominal:      General: Bowel sounds are normal       Palpations: Abdomen is soft  Tenderness: There is no abdominal tenderness  There is no guarding or rebound  Musculoskeletal:         General: No swelling, tenderness or deformity  Comments: Able to move upper/lower extremities bilaterally  2+ pitting edema lower extremities bilaterally   Skin:     General: Skin is warm and dry  Findings: Erythema, rash and wound present  Rash is scaling  Comments: Left heel ulcer   Neurological:      Mental Status: She is alert and oriented to person, place, and time     Psychiatric:         Mood and Affect: Mood normal          Speech: Speech normal          Behavior: Behavior normal            Additional Data:     Labs:    Results from last 7 days   Lab Units 03/16/21  1122 03/16/21  0522   WBC Thousand/uL  --  12 44*   HEMOGLOBIN g/dL  --  8 3*   HEMATOCRIT %  --  28 2*   PLATELETS Thousands/uL 204 229   NEUTROS PCT %  --  88*   LYMPHS PCT %  --  5*   MONOS PCT %  --  4   EOS PCT %  --  2     Results from last 7 days   Lab Units 03/16/21  1122  03/15/21  1933   SODIUM mmol/L 143   < > 143   POTASSIUM mmol/L 5 0  5 1   < > 4 8   CHLORIDE mmol/L 114*   < > 113*   CO2 mmol/L 16*   < > 18*   BUN mg/dL 71*   < > 72*   CREATININE mg/dL 3 31*   < > 3 23*   ANION GAP mmol/L 13   < > 12   CALCIUM mg/dL 7 5*   < > 7 5*   ALBUMIN g/dL  --   --  2 3*   TOTAL BILIRUBIN mg/dL  --   --  0 30   ALK PHOS U/L  --   --  99   ALT U/L  --   --  19   AST U/L  --   --  12   GLUCOSE RANDOM mg/dL 103   < > 202*    < > = values in this interval not displayed  Results from last 7 days   Lab Units 03/15/21  1933   INR  1 23*     Results from last 7 days   Lab Units 03/16/21  1106 03/16/21  0828 03/15/21  2328   POC GLUCOSE mg/dl 111 155* 153*     Results from last 7 days   Lab Units 03/15/21  1933   HEMOGLOBIN A1C % 7 6*               * I Have Reviewed All Lab Data Listed Above  * Additional Pertinent Lab Tests Reviewed: All Labs Within Last 24 Hours Reviewed    Imaging:    Imaging Reports Reviewed Today Include: XR foot left, CXR  Imaging Personally Reviewed by Myself Includes:      Recent Cultures (last 7 days):     Results from last 7 days   Lab Units 03/15/21  1933   BLOOD CULTURE  Received in Microbiology Lab  Culture in Progress  Received in Microbiology Lab  Culture in Progress         Last 24 Hours Medication List:   Current Facility-Administered Medications   Medication Dose Route Frequency Provider Last Rate    acetaminophen  650 mg Oral Q6H PRN Ana Nesbitt PA-C      albumin human  25 g Intravenous Once [employer], CRNP      aspirin  81 mg Oral Daily Ana Nesbitt PA-C      atorvastatin  40 mg Oral Daily Ana Nesbitt PA-C      carvedilol  25 mg Oral BID Ana Nesbitt PA-C      cefepime  2,000 mg Intravenous Q24H Nagi Pouch AMBER Miller      clopidogrel  75 mg Oral Daily Ana Nesbitt PA-C      furosemide  80 mg Intravenous Once [employer], CRNP      heparin (porcine)  5,000 Units Subcutaneous [facility] Sarah Sanchez PA-C      hydrALAZINE  75 mg Oral BID Zaida Lizarraga PA-C      insulin glargine  38 Units Subcutaneous QAM HARLAN Antoino-RAGHAVENDRA      insulin lispro  1-6 Units Subcutaneous TID AC HARLAN Antonio-C      insulin lispro  1-6 Units Subcutaneous HS Sarah Sanchez PA-C      sodium bicarbonate  1,300 mg Oral TID after meals HOWARD Puente      vancomycin  15 mg/kg (Adjusted) Intravenous Once Randall Cline PA-C          Today, Patient Was Seen By: Randall Cline PA-C    ** Please Note: Dictation voice to text software may have been used in the creation of this document   **

## 2021-03-16 NOTE — CONSULTS
Consultation - Nephrology   Nishi Brar 60 y o  female MRN: [id number]  Unit/Bed#: -01 Encounter: [id number]    ASSESSMENT and PLAN:  Acute kidney injury (POA) on chronic kidney disease, stage IV vs progression of chronic kidney disease, stage IV:  - etiology suspect secondary to cardiorenal syndrome, underlying cellulitis, ATN  - per review, note reviewed from [facility], but appears patient has not followed with nephrology in the past    - upon review of medical records, creatinine 1 9- 2 1 mg/dL in 2019, 2 1 mg/dL in 2020   - creatinine 3 23 mg/dL upon admission on 3/15   - most recent creatinine 3 33 mg/dL today  - status post torsemide 40 mg p o  upon admission    - will discontinue oral diuretics and provide albumin 25 g + furosemide 80 mg IV now     - UA: 250 glucose, greater than 300 protein, 10-20 WBC, moderate bacteria, 4-10 hyaline casts, 5-10 fine granular casts  - imaging: will check renal ultrasound  - check PVR with bladder scan, maintain urinary retention protocol   - no indication for emergent renal replacement therapy  Discussed dialysis with patient, at present, patient is unsure if she would want to pursue dialysis if warranted  - avoid NSAIDs, nephrotoxic agents, IV contrast   - adjust medications to appropriate GFR  - monitor volume status with strict intake/output, recommend [employer]  Electrolytes, acid/base:  - hyperkalemia most recent potassium 5 5, received medical treatment  Pending repeat potassium level, recommend strict low-potassium diet  - diuretics as above  - suspected elevated anion gap acidosis with most recent bicarbonate 14 and anion gap of 17, will start sodium bicarbonate supplements 2 tab three times daily  - will continue to monitor with repeat lab studies  Hypertension:  - blood pressure elevated upon admission, improving more recently    - outpatient regimen includes:  Carvedilol 25 mg b i d , hydralazine 75 mg b i d , torsemide 40 mg daily, amlodipine 2 5 mg daily  - currently on:  Carvedilol 25 mg b i d , hydralazine 75 mg b i d , torsemide 40 mg daily  - recommendations: will stop oral diuretics, provide IV as above  - optimize hemodynamics; avoid hypotension and fluctuations of blood pressure   - maintain MAP > 65  H/H, anemia of chronic kidney disease:  - most recent hemoglobin 8 3 grams/deciliter   - goal hemoglobin greater than 8 grams/deciliter  - recommend PRBC transfusion for hemoglobin less than 7  Acute on chronic CHF:  - chest x-ray completed upon admission revealed no acute cardiopulmonary disease, lungs clear with no effusion or pneumothorax  - echo completed in 2020 revealed EF of 55% with grade 2 diastolic dysfunction, awaiting repeat echocardiogram   - outpatient diuretics as above  - continue to monitor weight, intake/output closely  Cellulitis of bilateral lower extremities, on antibiotics per primary team, follow-up blood cultures  DM type 2, most recent hemoglobin A1c 7 4, on insulin per primary team        HISTORY OF PRESENT ILLNESS:  Requesting Physician: Rhonda Redmond MD  Reason for Consult:  Acute kidney injury    Reyna Gonzáles is a 60 y o  female with history of CHF, type 2 diabetes, CKD, hypertension who was admitted to [facility] after presenting with left heel pain that had been present for about a week  Patient found to have an ulcer on her left heel and also noted to have multiple open wounds in the bilateral lower extremities with signs of cellulitis  Patient is with history of cellulitis of bilateral lower extremities about 1 year ago when she was hospitalized  Patient reports that she stop taking her insulin around Thanksgiving and has not been checking her blood sugars  Per patient, she has been compliant with her other medications except her torsemide that she stop taking approximately 1 week ago   Patient stopped taking her torsemide as she did not want frequent trips to the bathroom given her pain in her bilateral lower extremities  Upon assessment and evaluation, patient is resting in bed comfortably  Patient is with intermittent shortness of breath  Patient denies chest pain  Patient states she has distention in her abdomen and intermittent nausea  Per patient, she is unsure if she would like to proceed with dialysis if warranted, she will continue to think about this  A renal consultation is requested today for assistance in the management of acute kidney injury  PAST MEDICAL HISTORY:  Past Medical History:   Diagnosis Date    Cardiac disease     [facility]congestive [facility] failure) ([facility] )     Coronary artery disease     Diabetes mellitus ([facility] )     Hyperlipidemia     Hypertension     Renal disorder     TIA (transient ischemic attack)        PAST SURGICAL HISTORY:  Past Surgical History:   Procedure Laterality Date    CARDIAC SURGERY       SECTION      CORONARY ANGIOPLASTY WITH STENT PLACEMENT         ALLERGIES:  Allergies   Allergen Reactions    Sodium Hypochlorite Other (See Comments)    Morphine     Penicillins        SOCIAL HISTORY:  Social History     Substance and Sexual Activity   Alcohol Use Never    Frequency: Never    Binge frequency: Never    Comment: socially     Social History     Substance and Sexual Activity   Drug Use No     Social History     Tobacco Use   Smoking Status Former Smoker    Years: 40 00   Smokeless Tobacco Never Used   Tobacco Comment    quit        FAMILY HISTORY:  History reviewed  No pertinent family history      MEDICATIONS:    Current Facility-Administered Medications:     acetaminophen (TYLENOL) tablet 650 mg, 650 mg, Oral, Q6H PRN, Mir Reilly PA-C    aspirin (ECOTRIN LOW STRENGTH) EC tablet 81 mg, 81 mg, Oral, Daily, Sarah Sanchez PA-C    atorvastatin (LIPITOR) tablet 40 mg, 40 mg, Oral, Daily, Sarah Sanchez PA-C    carvedilol (COREG) tablet 25 mg, 25 mg, Oral, BID, Mir Reilly PA-C    ceFAZolin (ANCEF) IVPB (premix in dextrose) 1,000 mg 50 mL, 1,000 mg, Intravenous, Q8H, Sarah Sanchez PA-C, Last Rate: 100 mL/hr at 03/16/21 0516, 1,000 mg at 03/16/21 0516    clopidogrel (PLAVIX) tablet 75 mg, 75 mg, Oral, Daily, Huong Araujo, PA-C    heparin (porcine) subcutaneous injection 5,000 Units, 5,000 Units, Subcutaneous, Q8H [address], 5,000 Units at 03/16/21 0513 **AND** Platelet count, , , Once, Huong Araujo, PA-C    hydrALAZINE (APRESOLINE) tablet 75 mg, 75 mg, Oral, BID, Sarah Sanchez PA-C, 75 mg at 03/15/21 2338    insulin glargine (LANTUS) subcutaneous injection 38 Units 0 38 mL, 38 Units, Subcutaneous, QAM, Sarah Sanchez PA-C    insulin lispro (HumaLOG) 100 units/mL subcutaneous injection 1-6 Units, 1-6 Units, Subcutaneous, TID AC **AND** Fingerstick Glucose (POCT), , , TID AC, Sarah Sanchez PA-C    insulin lispro (HumaLOG) 100 units/mL subcutaneous injection 1-6 Units, 1-6 Units, Subcutaneous, HS, Sarah Sanchez PA-C, 1 Units at 03/15/21 2329    permethrin (ELIMITE) 5 % cream, , Topical, Once, Huong Araujo, PA-C    torsemide (DEMADEX) tablet 40 mg, 40 mg, Oral, Daily, Huong Araujo, PA-C    REVIEW OF SYSTEMS:  All the systems were reviewed and were negative except as documented on the HPI      PHYSICAL EXAM:  Current Weight: Weight - Scale: (!) 262 kg (578 lb 0 7 oz)  First Weight: Weight - Scale: 113 kg (250 lb)  Vitals:    03/15/21 2337 03/16/21 0016 03/16/21 0500 03/16/21 0830   BP: 155/95 155/95  138/58   BP Location:       Pulse: 101 101  71   Resp:  18  17   Temp:  97 5 °F (36 4 °C)  98 °F (36 7 °C)   TempSrc:  Oral     SpO2: 97%   97%   Weight:   (!) 262 kg (578 lb 0 7 oz)    Height:           Intake/Output Summary (Last 24 hours) at 3/16/2021 0913  Last data filed at 3/16/2021 0701  Gross per 24 hour   Intake 300 ml   Output --   Net 300 ml     General: conscious, coherent, cooperative, not in acute distress  Skin:  Rash, warm, dry  Eyes: pale conjunctivae, anicteric sclerae  ENT: moist lips and mucous membranes  Neck: supple, with trachea midline  Chest:  Diminished breath sounds  CVS:  normal rate, regular rhythm  Abdomen:  Obese, rounded  Extremities:  Edema bilaterally with wounds present  Neuro: awake, alert  Psych: appropriate affect       Invasive Devices:        Lab Results:   Results from last 7 days   Lab Units 03/16/21  0522 03/15/21  1933   WBC Thousand/uL 12 44* 11 01*   HEMOGLOBIN g/dL 8 3* 8 1*   HEMATOCRIT % 28 2* 27 0*   PLATELETS Thousands/uL 229 223   POTASSIUM mmol/L 5 5* 4 8   CHLORIDE mmol/L 114* 113*   CO2 mmol/L 14* 18*   BUN mg/dL 69* 72*   CREATININE mg/dL 3 33* 3 23*   CALCIUM mg/dL 7 5* 7 5*   ALK PHOS U/L  --  99   ALT U/L  --  19   AST U/L  --  12

## 2021-03-16 NOTE — DISCHARGE INSTR - OTHER ORDERS
Skin Care orders:  1-Hydraguard to sacrum, buttocks BID  and PRN  2-EHOB cushion when out of bed in chair  3-Moisturize skin daily with skin nourishing cream  4-Elevate heels to offload pressure  5-Turn/reposition q2h for pressure re-distribution on skin  6  Podiatry to manage lower extremity and left heel wound - orders placed by Dr Barnett Handler   7   Upon discharge for the  wound center for the right lower leg and left heel wound - central scheduling for appointment at [phone] option 1

## 2021-03-16 NOTE — QUICK NOTE
Patient with potassium of 5 5 on morning BMP  Ordered 50 mL of dextrose, 1 g calcium gluconate, 10 units or regular insulin, and 15 g of kayexalate  Continue to keep patient on telemetry   Recheck potassium at 1000

## 2021-03-16 NOTE — ASSESSMENT & PLAN NOTE
· Likely due to worsening renal function  · Appreciate nephrology input - start sodium bicarbonate tablets  · Continue to trend BMP

## 2021-03-16 NOTE — ASSESSMENT & PLAN NOTE
Lab Results   Component Value Date    EGFR 14 03/15/2021    EGFR 25 01/08/2020    EGFR 24 01/07/2020    CREATININE 3 23 (H) 03/15/2021    CREATININE 2 06 (H) 01/08/2020    CREATININE 2 15 (H) 01/07/2020     · Creatinine 3 23 on admission   · Creatinine around 2 06 1 year ago  · Consult Nephrology  · Continue to monitor

## 2021-03-16 NOTE — PLAN OF CARE
Problem: Potential for Falls  Goal: Patient will remain free of falls  Description: INTERVENTIONS:  - Assess patient frequently for physical needs  -  Identify cognitive and physical deficits and behaviors that affect risk of falls    -  [place] fall precautions as indicated by assessment   - Educate patient/family on patient safety including physical limitations  - Instruct patient to call for assistance with activity based on assessment  - Modify environment to reduce risk of injury  - Consider OT/PT consult to assist with strengthening/mobility  Outcome: Progressing     Problem: PAIN - ADULT  Goal: Verbalizes/displays adequate comfort level or baseline comfort level  Description: Interventions:  - Encourage patient to monitor pain and request assistance  - Assess pain using appropriate pain scale  - Administer analgesics based on type and severity of pain and evaluate response  - Implement non-pharmacological measures as appropriate and evaluate response  - Consider cultural and social influences on pain and pain management  - Notify physician/advanced practitioner if interventions unsuccessful or patient reports new pain  Outcome: Progressing     Problem: INFECTION - ADULT  Goal: Absence or prevention of progression during hospitalization  Description: INTERVENTIONS:  - Assess and monitor for signs and symptoms of infection  - Monitor lab/diagnostic results  - Monitor all insertion sites, i e  indwelling lines, tubes, and drains  - Monitor endotracheal if appropriate and nasal secretions for changes in amount and color  - [place] appropriate cooling/warming therapies per order  - Administer medications as ordered  - Instruct and encourage patient and family to use good hand hygiene technique  - Identify and instruct in appropriate isolation precautions for identified infection/condition  Outcome: Progressing  Goal: Absence of fever/infection during neutropenic period  Description: INTERVENTIONS:  - Monitor WBC    Outcome: Progressing     Problem: SAFETY ADULT  Goal: Patient will remain free of falls  Description: INTERVENTIONS:  - Assess patient frequently for physical needs  -  Identify cognitive and physical deficits and behaviors that affect risk of falls    -  [place] fall precautions as indicated by assessment   - Educate patient/family on patient safety including physical limitations  - Instruct patient to call for assistance with activity based on assessment  - Modify environment to reduce risk of injury  - Consider OT/PT consult to assist with strengthening/mobility  Outcome: Progressing  Goal: Maintain or return to baseline ADL function  Description: INTERVENTIONS:  -  Assess patient's ability to carry out ADLs; assess patient's baseline for ADL function and identify physical deficits which impact ability to perform ADLs (bathing, care of mouth/teeth, toileting, grooming, dressing, etc )  - Assess/evaluate cause of self-care deficits   - Assess range of motion  - Assess patient's mobility; develop plan if impaired  - Assess patient's need for assistive devices and provide as appropriate  - Encourage maximum independence but intervene and supervise when necessary  - Involve family in performance of ADLs  - Assess for home care needs following discharge   - Consider OT consult to assist with ADL evaluation and planning for discharge  - Provide patient education as appropriate  Outcome: Progressing  Goal: Maintain or return mobility status to optimal level  Description: INTERVENTIONS:  - Assess patient's baseline mobility status (ambulation, transfers, stairs, etc )    - Identify cognitive and physical deficits and behaviors that affect mobility  - Identify mobility aids required to assist with transfers and/or ambulation (gait belt, sit-to-stand, lift, walker, cane, etc )  - [place] fall precautions as indicated by assessment  - Record patient progress and toleration of activity level on Mobility SBAR; progress patient to next Phase/Stage  - Instruct patient to call for assistance with activity based on assessment  - Consider rehabilitation consult to assist with strengthening/weightbearing, etc   Outcome: Progressing     Problem: DISCHARGE PLANNING  Goal: Discharge to home or other facility with appropriate resources  Description: INTERVENTIONS:  - Identify barriers to discharge w/patient and caregiver  - Arrange for needed discharge resources and transportation as appropriate  - Identify discharge learning needs (meds, wound care, etc )  - Arrange for interpretive services to assist at discharge as needed  - Refer to Case Management Department for coordinating discharge planning if the patient needs post-hospital services based on physician/advanced practitioner order or complex needs related to functional status, cognitive ability, or social support system  Outcome: Progressing     Problem: Knowledge Deficit  Goal: Patient/family/caregiver demonstrates understanding of disease process, treatment plan, medications, and discharge instructions  Description: Complete learning assessment and assess knowledge base    Interventions:  - Provide teaching at level of understanding  - Provide teaching via preferred learning methods  Outcome: Progressing     Problem: CARDIOVASCULAR - ADULT  Goal: Maintains optimal cardiac output and hemodynamic stability  Description: INTERVENTIONS:  - Monitor I/O, vital signs and rhythm  - Monitor for S/S and trends of decreased cardiac output  - Administer and titrate ordered vasoactive medications to optimize hemodynamic stability  - Assess quality of pulses, skin color and temperature  - Assess for signs of decreased coronary artery perfusion  - Instruct patient to report change in severity of symptoms  Outcome: Progressing  Goal: Absence of cardiac dysrhythmias or at baseline rhythm  Description: INTERVENTIONS:  - Continuous cardiac monitoring, vital signs, obtain 12 lead EKG if ordered  - Administer antiarrhythmic and heart rate control medications as ordered  - Monitor electrolytes and administer replacement therapy as ordered  Outcome: Progressing     Problem: SKIN/TISSUE INTEGRITY - ADULT  Goal: Skin integrity remains intact  Description: INTERVENTIONS  - Identify patients at risk for skin breakdown  - Assess and monitor skin integrity  - Assess and monitor nutrition and hydration status  - Monitor labs (i e  albumin)  - Assess for incontinence   - Turn and reposition patient  - Assist with mobility/ambulation  - Relieve pressure over bony prominences  - Avoid friction and shearing  - Provide appropriate hygiene as needed including keeping skin clean and dry  - Evaluate need for skin moisturizer/barrier cream  - Collaborate with interdisciplinary team (i e  Nutrition, Rehabilitation, etc )   - Patient/family teaching  Outcome: Progressing  Goal: Incision(s), wounds(s) or drain site(s) healing without S/S of infection  Description: INTERVENTIONS  - Assess and document risk factors for skin impairment   - Assess and document dressing, incision, wound bed, drain sites and surrounding tissue  - Consider nutrition services referral as needed  - Oral mucous membranes remain intact  - Provide patient/ family education  Outcome: Progressing  Goal: Oral mucous membranes remain intact  Description: INTERVENTIONS  - Assess oral mucosa and hygiene practices  - Implement preventative oral hygiene regimen  - Implement oral medicated treatments as ordered  - Initiate Nutrition services referral as needed  Outcome: Progressing     Problem: Prexisting or High Potential for Compromised Skin Integrity  Goal: Skin integrity is maintained or improved  Description: INTERVENTIONS:  - Identify patients at risk for skin breakdown  - Assess and monitor skin integrity  - Assess and monitor nutrition and hydration status  - Monitor labs   - Assess for incontinence   - Turn and reposition patient  - Assist with mobility/ambulation  - Relieve pressure over bony prominences  - Avoid friction and shearing  - Provide appropriate hygiene as needed including keeping skin clean and dry  - Evaluate need for skin moisturizer/barrier cream  - Collaborate with interdisciplinary team   - Patient/family teaching  - Consider wound care consult   Outcome: Progressing     Problem: Nutrition/Hydration-ADULT  Goal: Nutrient/Hydration intake appropriate for improving, restoring or maintaining nutritional needs  Description: Monitor and assess patient's nutrition/hydration status for malnutrition  Collaborate with interdisciplinary team and initiate plan and interventions as ordered  Monitor patient's weight and dietary intake as ordered or per policy  Utilize nutrition screening tool and intervene as necessary  Determine patient's food preferences and provide high-protein, high-caloric foods as appropriate       INTERVENTIONS:  - Monitor oral intake, urinary output, labs, and treatment plans  - Assess nutrition and hydration status and recommend course of action  - Evaluate amount of meals eaten  - Assist patient with eating if necessary   - Allow adequate time for meals  - Recommend/ encourage appropriate diets, oral nutritional supplements, and vitamin/mineral supplements  - Order, calculate, and assess calorie counts as needed  - Recommend, monitor, and adjust tube feedings and TPN/PPN based on assessed needs  - Assess need for intravenous fluids  - Provide specific nutrition/hydration education as appropriate  - Include patient/family/caregiver in decisions related to nutrition  Outcome: Progressing

## 2021-03-16 NOTE — ASSESSMENT & PLAN NOTE
Lab Results   Component Value Date    HGBA1C 7 6 (H) 03/15/2021       Recent Labs     03/15/21  2328 03/16/21  0828 03/16/21  1106   POCGLU 153* 155* 111       Blood Sugar Average: Last 72 hrs:  · (P) 578 [id number] with blood sugar of 202 on admission  · Patient stopped taking insulin around Thanksgiving 2020  At that time patient was taking insulin glargine 38 units in the morning and sliding scale insulin with meals     · Restart insulin glargine 38 units in the morning  · Hemoglobin A1c 7 6  · SSI plus Accu-Chek  · Diabetic diet

## 2021-03-16 NOTE — H&P
[place]  H&P- Daniel Bail 1955, 60 y o  female MRN: [id number]  Unit/Bed#: -01 Encounter: [id number]  Primary Care Provider: Francisco Messer DO   Date and time admitted to hospital: 3/15/2021  7:09 PM    * Acute on chronic congestive heart failure ([address] )  Assessment & Plan  Wt Readings from Last 3 Encounters:   03/15/21 113 kg (250 lb)   02/21/20 117 kg (258 lb)   01/07/20 117 kg (258 lb 12 8 oz)     · Patient does not appear to be fluid overload  · Patient stopped taking home torsemide about 1 week ago due to trouble with ambulation  · In the ED given torsemide p o  40 mg  · Restart torsemide 40 mg daily and continue carvedilol 25 mg b i d  and Plavix 75 mg daily  · BNP 21,507 on admission  · Last echo 01/08/2020:  EF 55%  · Repeat echo  · Place patient on telemetry  · Consult Cardiology        Cellulitis of both lower extremities  Assessment & Plan  · Patient initially came into the ED with diapers wrapped around both of her legs and reported left heel pain over the last few days  Patient noted to have open wounds on both right and left leg with signs of cellulitis  · about 1 year ago patient reports she had been seeing wound care but stopped due to her wounds improving and fear of jennifer COVID-19  · White blood cells slightly elevated at 11, afebrile  · Blood cultures pending  · ED started patient on Cefepime    Start Ancef  · Consult wound care      Acute kidney injury superimposed on chronic kidney disease [facility])  Assessment & Plan  Lab Results   Component Value Date    EGFR 14 03/15/2021    EGFR 25 01/08/2020    EGFR 24 01/07/2020    CREATININE 3 23 (H) 03/15/2021    CREATININE 2 06 (H) 01/08/2020    CREATININE 2 15 (H) 01/07/2020     · Creatinine 3 23 on admission   · Creatinine around 2 06 1 year ago  · Consult Nephrology  · Continue to monitor    Type 2 diabetes mellitus with hyperglycemia, with long-term current use of insulin (HCC)  Assessment & Plan  Lab Results   Component Value Date    HGBA1C 7 4 (H) 01/07/2020       No results for input(s): POCGLU in the last 72 hours  Blood Sugar Average: Last 72 hrs:  · Patient with blood sugar of 202 on admission  · Patient stopped taking insulin around Thanksgiving 2020  At that time patient was taking insulin glargine 38 units in the morning and sliding scale insulin with meals  · Restart insulin glargine 38 units in the morning  · Order A1c  · Start sliding scale insulin    Foot ulcer, left ([address] )  Assessment & Plan  · Patient with ulcer on left heel of the foot  · Patient is unaware when ulcer started  · Patient reports increased pain with touch and walking over the past week  · Patient with poor compliance of diabetes insulin  · Podiatry and Wound Care consulted    Non compliance w medication regimen  Assessment & Plan  · Patient reports she stopped taking her insulin around Thanksgiving 2020, her torsemide about 1 week ago and has not been seeing wound care for her legs,   · Patient had been seeing wound care for wounds of her bilateral lower extremities but stopped going due to the fear of jennifer Covid-19  · consult case management    Anemia  Assessment & Plan  · Hemoglobin 8 1  · No signs of active bleeding  · Baseline from about 1 year ago about 9  · Continue to monitor    Essential hypertension  Assessment & Plan  · Patient's blood pressure 155/95  · Continue home carvedilol 25 mg b i d , hydralazine 75 mg b i d   · Continue to monitor blood pressure    Class 3 severe obesity with body mass index (BMI) of 45 0 to 49 9 in adult [facility])  Assessment & Plan  · Body mass index is 46 55 kg/m²    · Encourage lifestyle changes  · Consult nutrition    Stage 4 chronic kidney disease [facility])  Assessment & Plan  Lab Results   Component Value Date    EGFR 14 03/15/2021    EGFR 25 01/08/2020    EGFR 24 01/07/2020    CREATININE 3 23 (H) 03/15/2021    CREATININE 2 06 (H) 01/08/2020    CREATININE 2 15 (H) 01/07/2020     VTE Prophylaxis: Heparin  / reason for no mechanical VTE prophylaxis bilateral wounds on lower extremities   Code Status: Level 1 Full code   POLST: There is no POLST form on file for this patient (pre-hospital)  Discussion with family: No     Anticipated Length of Stay:  Patient will be admitted on an Inpatient basis with an anticipated length of stay of  > 2 midnights  Justification for Hospital Stay: Acute CHF exacerbation, cellulitis     Total Time for Visit, including Counseling / Coordination of Care: 1 hour  Greater than 50% of this total time spent on direct patient counseling and coordination of care  Chief Complaint:  Left heel pain  History of Present Illness:    Viviana Avila is a 60 y o  female with past medical history of CHF, type 2 diabetes on insulin, CKD and hypertension who presents with left heel pain that has been present for about a week  Patient is found to have an ulcer on the left heel  Patient is also noted to have multiple open wounds on bilateral lower extremities with signs of cellulitis  Patient has history of cellulitis of bilateral lower extremities about 1 year ago when she was hospitalized  Patient was discharged with instructions to see wound care  Patient followed up with wound care for short period of time but stopped due to improvement and fear of jennifer COVID-19  Patient also reports that she stopped taking her insulin around Thanksgiving and has not been taking her blood sugars  Patient reports compliance with all other medications except for her torsemide that she stop taking about 1 week ago  She stopped taking her torsemide because she has had increased difficulty with ambulation due to the pain in her legs and did not want the frequent trips to the bathroom caused by the torsemide  Patient denies chest pain, shortness of breath, abdominal pain, nausea, vomiting, diarrhea       Review of Systems:    Review of Systems Constitutional: Positive for fever  Negative for fatigue  HENT: Negative for sore throat  Respiratory: Negative for cough, chest tightness and shortness of breath  Cardiovascular: Negative for chest pain and leg swelling  Gastrointestinal: Negative for abdominal distention, abdominal pain, diarrhea, nausea and vomiting  Genitourinary: Negative for difficulty urinating  Musculoskeletal: Negative for arthralgias  Skin: Positive for wound  Ulcer on left heel causing pain   Neurological: Positive for weakness  Negative for headaches  Psychiatric/Behavioral: Negative for agitation and behavioral problems  All other systems reviewed and are negative  Past Medical and Surgical History:     Past Medical History:   Diagnosis Date    Cardiac disease     CHF [facility]congestive [facility] failure) ([facility] )     Coronary artery disease     Diabetes mellitus ([facility] )     Hyperlipidemia     Hypertension     Renal disorder     TIA (transient ischemic attack)        Past Surgical History:   Procedure Laterality Date    CARDIAC SURGERY       SECTION      CORONARY ANGIOPLASTY WITH STENT PLACEMENT         Meds/Allergies:    Prior to Admission medications    Medication Sig Start Date End Date Taking?  Authorizing Provider   aspirin (ECOTRIN LOW STRENGTH) 81 mg EC tablet Take 81 mg by mouth daily   Yes Historical Provider, MD   atorvastatin (LIPITOR) 40 mg tablet Take 40 mg by mouth daily   Yes Historical Provider, MD   carvedilol (COREG) 25 mg tablet Take 25 mg by mouth 2 (two) times a day  3/18/19 3/15/21 Yes Historical Provider, MD   clopidogrel (PLAVIX) 75 mg tablet Take 75 mg by mouth daily   Yes Historical Provider, MD   hydrALAZINE (APRESOLINE) 25 mg tablet Take 25 mg by mouth 3 (three) times a day   Yes Historical Provider, MD   hydrALAZINE (APRESOLINE) 50 mg tablet Take by mouth 3 (three) times a day   Yes Historical Provider, MD   torsemide (DEMADEX) 20 mg tablet Take 40 mg by mouth daily   Yes Historical Provider, MD   amLODIPine (NORVASC) 2 5 mg tablet Take 2 5 mg by mouth daily    Historical Provider, MD   insulin aspart (NovoLOG) 100 units/mL injection Inject 14 Units under the skin 3 (three) times a day before meals Continue pt's home sliding scale 0-14 units with meals  Patient not taking: Reported on 3/15/2021 1/8/20   Juan Alberto De La Torre DO   insulin glargine (Toujeo SoloStar) 300 units/mL CONCETRATED U-300 injection pen (1-unit dial) use as directed 1/9/18   Historical Provider, MD   nitroglycerin (NITROSTAT) 0 4 mg SL tablet Place 0 4 mg under the tongue 11/5/18   Historical Provider, MD     I have reviewed home medications with patient personally  Allergies: Allergies   Allergen Reactions    Sodium Hypochlorite Other (See Comments)    Morphine     Penicillins        Social History:     Marital Status:    Occupation:  Unknown  Patient Pre-hospital Living Situation:  Home  Patient Pre-hospital Level of Mobility:  Limited  Patient Pre-hospital Diet Restrictions:  Diabetic  Substance Use History:   Social History     Substance and Sexual Activity   Alcohol Use Never    Frequency: Never    Binge frequency: Never    Comment: socially     Social History     Tobacco Use   Smoking Status Former Smoker    Years: 40 00   Smokeless Tobacco Never Used   Tobacco Comment    quit 1980     Social History     Substance and Sexual Activity   Drug Use No       Family History:    History reviewed  No pertinent family history  Physical Exam:     Vitals:   Blood Pressure: 155/95 (03/16/21 0016)  Pulse: 101 (03/16/21 0016)  Temperature: 97 5 °F (36 4 °C) (03/16/21 0016)  Temp Source: Oral (03/16/21 0016)  Respirations: 18 (03/16/21 0016)  Height: 5' 4" (162 6 cm) (03/15/21 2231)  Weight - Scale: 123 kg (271 lb 2 7 oz) (03/15/21 2231)  SpO2: 97 % (03/15/21 2337)    Physical Exam  Vitals signs and nursing note reviewed  Constitutional:       Appearance: Normal appearance  She is obese     HENT: Head: Normocephalic  Eyes:      Extraocular Movements: Extraocular movements intact  Pupils: Pupils are equal, round, and reactive to light  Neck:      Musculoskeletal: Normal range of motion  Cardiovascular:      Rate and Rhythm: Normal rate and regular rhythm  Heart sounds: No murmur  Pulmonary:      Effort: Pulmonary effort is normal  No respiratory distress  Breath sounds: Normal breath sounds  No wheezing  Abdominal:      General: Bowel sounds are normal  There is no distension  Tenderness: There is no abdominal tenderness  There is no guarding  Musculoskeletal: Normal range of motion  Right lower leg: No edema  Left lower leg: No edema  Skin:     General: Skin is warm  Findings: Erythema, lesion and rash present  Comments: Ulcer on left heel of foot   Neurological:      General: No focal deficit present  Mental Status: She is alert and oriented to person, place, and time  Psychiatric:         Mood and Affect: Mood is anxious  Behavior: Behavior normal          Thought Content: Thought content normal          Additional Data:     Lab Results: I have personally reviewed pertinent reports        Results from last 7 days   Lab Units 03/15/21  1933   WBC Thousand/uL 11 01*   HEMOGLOBIN g/dL 8 1*   HEMATOCRIT % 27 0*   PLATELETS Thousands/uL 223   NEUTROS PCT % 77*   LYMPHS PCT % 7*   MONOS PCT % 6   EOS PCT % 10*     Results from last 7 days   Lab Units 03/15/21  1933   SODIUM mmol/L 143   POTASSIUM mmol/L 4 8   CHLORIDE mmol/L 113*   CO2 mmol/L 18*   BUN mg/dL 72*   CREATININE mg/dL 3 23*   ANION GAP mmol/L 12   CALCIUM mg/dL 7 5*   ALBUMIN g/dL 2 3*   TOTAL BILIRUBIN mg/dL 0 30   ALK PHOS U/L 99   ALT U/L 19   AST U/L 12   GLUCOSE RANDOM mg/dL 202*     Results from last 7 days   Lab Units 03/15/21  1933   INR  1 23*     Results from last 7 days   Lab Units 03/15/21  2328   POC GLUCOSE mg/dl 153*               Imaging: I have personally reviewed pertinent reports  XR chest 1 view portable   Final Result by Suzann Babinski, MD (03/15 2219)      No acute cardiopulmonary disease  Workstation performed: [id number]         XR foot 3+ views LEFT   ED Interpretation by David Lindsay PA-C (03/15 2019)   Calcaneal spur, no acute fracture  Allscripts / Epic Records Reviewed: Yes     ** Please Note: This note has been constructed using a voice recognition system   **

## 2021-03-16 NOTE — ASSESSMENT & PLAN NOTE
· Patient reports she stopped taking her insulin around Thanksgiving 2020, her torsemide about 1 week ago and has not been seeing wound care for her legs,   · Patient had been seeing wound care for wounds of her bilateral lower extremities but stopped going due to the fear of jennifer Covid-19  · consult case management

## 2021-03-16 NOTE — ASSESSMENT & PLAN NOTE
· Hemoglobin 8 3  · No signs of active bleeding  · Baseline from about 1 year ago about 9  · Continue to monitor

## 2021-03-16 NOTE — WOUND OSTOMY CARE
Consult Note - Wound   Loly Jennifer 60 y o  female MRN: [id number]  Unit/Bed#: -01 Encounter: [id number]      History and Present Illness: Patient is out of bed in the chair   RN reports it is very difficult for her to stand   RN Vikas Early reports that she has a linear scratched area or cut on the left inner buttocks area   Patient reports she scratched herself   No noted picture in media   Vikas Early RN will take a picture for the chart of the left inner buttocks   She was admitted with chronic congestive heart failure , obesity , DM , anemia , cellulitis of both lower legs , and left foot wound   Podiatry Dr Senia Aviles is consulted and he placed treatment orders for the right lower leg and the left heel wound   Patient has scattered scratched areas on the arms and legs   Patient reports she is a  with her skin   Assessment Findings:   1  RN linear area on the left inner buttocks pink in color   Will place orders for treatment         Skin Care orders:  1-Hydraguard to sacrum, buttocks BID  and PRN  2-EHOB cushion when out of bed in chair  3-Moisturize skin daily with skin nourishing cream  4-Elevate heels to offload pressure  5-Turn/reposition q2h for pressure re-distribution on skin  6  Podiatry to manage lower extremity and left heel wound - orders placed by Dr Senia Aviles   7  Upon discharge for the  wound center for the right lower leg and left heel wound - central scheduling for appointment at [phone] option 1            Vitals: Blood pressure 138/58, pulse 71, temperature 98 °F (36 7 °C), resp  rate 17, height 5' 4" (1 626 m), weight 119 kg (261 lb 14 4 oz), SpO2 93 %, not currently breastfeeding  ,Body mass index is 44 96 kg/m²  Picture from the ER   Wound 03/15/21 Neuropathic Leg Left;Right; Lower (Active)   Wound Image    03/15/21 2031   Wound Description Edema;Fragile 03/16/21 0900   Kathy-wound Assessment Edema;Pink 03/16/21 0900   Drainage Amount Small 03/16/21 0900   Drainage Description Yellow 03/16/21 0900   Treatments Cleansed;Site care 03/16/21 0900   Dressing Xeroform;ABD;Pressure dressing 03/16/21 0900   Wound packed?  No 03/16/21 0900       Wound care will follow weekly call or tiger text with questions or concerns     Jacqui Mcgowan RN BSN CWOCN

## 2021-03-16 NOTE — ASSESSMENT & PLAN NOTE
Wt Readings from Last 3 Encounters:   03/16/21 119 kg (261 lb 14 4 oz)   02/21/20 117 kg (258 lb)   01/07/20 117 kg (258 lb 12 8 oz)     · Noted to have lower extremity edema  · Patient stopped taking home torsemide about 1 week ago due to trouble with ambulation     · In the ED given torsemide p o  40 mg  · Originally reordered for torsemide 40 mg daily, discussed with Nephrology, will administer IV albumin x1 in addition to 80 mg IV Lasix x1  · BNP 21,507 on admission  · Last echo 01/08/2020:  EF 55%  · Repeat echo pending  · Place patient on telemetry  · Daily weights  · I/O  · Low-sodium diet  · Nephrology consulted - appreciate recommendations

## 2021-03-16 NOTE — ASSESSMENT & PLAN NOTE
Lab Results   Component Value Date    HGBA1C 7 4 (H) 01/07/2020       No results for input(s): POCGLU in the last 72 hours  Blood Sugar Average: Last 72 hrs:  · Patient with blood sugar of 202 on admission  · Patient stopped taking insulin around Thanksgiving 2020  At that time patient was taking insulin glargine 38 units in the morning and sliding scale insulin with meals     · Restart insulin glargine 38 units in the morning  · Order A1c  · Start sliding scale insulin

## 2021-03-16 NOTE — ASSESSMENT & PLAN NOTE
· Patient's blood pressure 155/95  · Continue home carvedilol 25 mg b i d , hydralazine 75 mg b i d   · Continue to monitor blood pressure

## 2021-03-16 NOTE — ASSESSMENT & PLAN NOTE
· Hemoglobin 8 1  · No signs of active bleeding  · Baseline from about 1 year ago about 9  · Continue to monitor

## 2021-03-16 NOTE — MALNUTRITION/BMI
This medical record reflects one or more clinical indicators suggestive of malnutrition and/or morbid obesity  BMI Findings:  Adult BMI Classifications: Morbid Obesity 40-44 9     Body mass index is 44 96 kg/m²  Treat with CCD2, 2 gm NA+, 1800 ml fluid restriction     See Nutrition note dated 03/16/21 for additional details  Completed nutrition assessment is viewable in the nutrition documentation

## 2021-03-16 NOTE — PROGRESS NOTES
Vancomycin Assessment    Conner Mcmahan is a 60 y o  female who is currently receiving vancomycin 1750mg IV Q24H for skin-soft tissue infection     Relevant clinical data and objective history reviewed:  Creatinine   Date Value Ref Range Status   03/16/2021 3 31 (H) 0 60 - 1 30 mg/dL Final     Comment:     Standardized to IDMS reference method   03/16/2021 3 33 (H) 0 60 - 1 30 mg/dL Final     Comment:     Standardized to IDMS reference method   03/15/2021 3 23 (H) 0 60 - 1 30 mg/dL Final     Comment:     Standardized to IDMS reference method   03/10/2015 0 65 0 60 - 1 30 mg/dL Final     Comment:     Standardized to IDMS reference method   04/07/2014 0 60 0 60 - 1 30 mg/dL Final     Comment:     Standardized to IDMS reference method     /58   Pulse 71   Temp 98 °F (36 7 °C)   Resp 17   Ht 5' 4" (1 626 m)   Wt 119 kg (261 lb 14 4 oz)   SpO2 93%   BMI 44 96 kg/m²   No intake/output data recorded  Lab Results   Component Value Date/Time    BUN 71 (H) 03/16/2021 11:22 AM    BUN 22 03/10/2015 03:03 PM    WBC 12 44 (H) 03/16/2021 05:22 AM    WBC 9 69 03/10/2015 03:03 PM    HGB 8 3 (L) 03/16/2021 05:22 AM    HGB 15 3 03/10/2015 03:03 PM    HCT 28 2 (L) 03/16/2021 05:22 AM    HCT 44 7 03/10/2015 03:03 PM    MCV 93 03/16/2021 05:22 AM    MCV 85 03/10/2015 03:03 PM     03/16/2021 11:22 AM     03/10/2015 03:03 PM     Temp Readings from Last 3 Encounters:   03/16/21 98 °F (36 7 °C)   08/17/20 97 5 °F (36 4 °C)   08/12/20 97 9 °F (36 6 °C)     Vancomycin Days of Therapy: 1    Assessment/Plan  The patient is currently on vancomycin utilizing scheduled dosing based on adjusted body weight (due to obesity)  Baseline risks associated with therapy include: pre-existing renal impairment, concomitant nephrotoxic medications, and advanced age        The patient is currently ordered Vancomycin 1750mg IV Q24H and after clinical evaluation dose will be changed to  1250mg IV once as bolus and then 750mg IV Q24H   Pharmacy will also follow closely for s/sx of nephrotoxicity, infusion reactions, and appropriateness of therapy  BMP and CBC will be ordered per protocol  Plan for trough as patient approaches steady state, prior to the 4th  dose at approximately 1530 on 03/19/21  Due to infection severity, will target a trough of 15-20 (appropriate for most indications)   Pharmacy will continue to follow the patients culture results and clinical progress daily      Efrem Moses, Pharmacist

## 2021-03-16 NOTE — ASSESSMENT & PLAN NOTE
· Patient initially came into the ED with diapers wrapped around both of her legs and reported left heel pain over the last few days  Patient noted to have open wounds on both right and left leg with signs of cellulitis  · About 1 year ago patient reports she had been seeing wound care but stopped due to her wounds improving and fear of jennifer COVID-19     · Leukocytosis 12 K  · Blood cultures pending  · ED started patient on Cefepime and originally had been transitioned to Ancef, will broadened to cefepime/vancomycin due to diabetic ulcer with drainage  · Consult podiatry

## 2021-03-16 NOTE — ASSESSMENT & PLAN NOTE
· Patient with ulcer on left heel of the foot  · Patient is unaware when ulcer started but notes the rash on her lower extremities began about 6 weeks ago  · Patient reports increased pain with touch and walking over the past week  · Patient with poor compliance of diabetes insulin  · Continue IV cefepime/vancomycin  · XR left foot completed on 03/15 with calcaneal spurring, no acute osseous abnormality  · Podiatry consult

## 2021-03-16 NOTE — ASSESSMENT & PLAN NOTE
Lab Results   Component Value Date    EGFR 14 03/15/2021    EGFR 25 01/08/2020    EGFR 24 01/07/2020    CREATININE 3 23 (H) 03/15/2021    CREATININE 2 06 (H) 01/08/2020    CREATININE 2 15 (H) 01/07/2020

## 2021-03-17 ENCOUNTER — APPOINTMENT (INPATIENT)
Dept: ULTRASOUND IMAGING | Facility: HOSPITAL | Age: 66
DRG: 698 | End: 2021-03-17
Payer: COMMERCIAL

## 2021-03-17 LAB
ANION GAP SERPL CALCULATED.3IONS-SCNC: 13 MMOL/L (ref 4–13)
BACTERIA UR CULT: NORMAL
BASOPHILS # BLD AUTO: 0.03 THOUSANDS/ΜL (ref 0–0.1)
BASOPHILS NFR BLD AUTO: 0 % (ref 0–1)
BUN SERPL-MCNC: 74 MG/DL (ref 5–25)
CALCIUM SERPL-MCNC: 7.6 MG/DL (ref 8.3–10.1)
CHLORIDE SERPL-SCNC: 115 MMOL/L (ref 100–108)
CO2 SERPL-SCNC: 17 MMOL/L (ref 21–32)
CREAT SERPL-MCNC: 3.57 MG/DL (ref 0.6–1.3)
EOSINOPHIL # BLD AUTO: 0.47 THOUSAND/ΜL (ref 0–0.61)
EOSINOPHIL NFR BLD AUTO: 6 % (ref 0–6)
ERYTHROCYTE [DISTWIDTH] IN BLOOD BY AUTOMATED COUNT: 16.4 % (ref 11.6–15.1)
GFR SERPL CREATININE-BSD FRML MDRD: 13 ML/MIN/1.73SQ M
GLUCOSE SERPL-MCNC: 100 MG/DL (ref 65–140)
GLUCOSE SERPL-MCNC: 126 MG/DL (ref 65–140)
GLUCOSE SERPL-MCNC: 141 MG/DL (ref 65–140)
GLUCOSE SERPL-MCNC: 178 MG/DL (ref 65–140)
GLUCOSE SERPL-MCNC: 73 MG/DL (ref 65–140)
GLUCOSE SERPL-MCNC: 93 MG/DL (ref 65–140)
HCT VFR BLD AUTO: 24 % (ref 34.8–46.1)
HGB BLD-MCNC: 7.1 G/DL (ref 11.5–15.4)
IMM GRANULOCYTES # BLD AUTO: 0.01 THOUSAND/UL (ref 0–0.2)
IMM GRANULOCYTES NFR BLD AUTO: 0 % (ref 0–2)
LYMPHOCYTES # BLD AUTO: 0.67 THOUSANDS/ΜL (ref 0.6–4.47)
LYMPHOCYTES NFR BLD AUTO: 9 % (ref 14–44)
MAGNESIUM SERPL-MCNC: 1.8 MG/DL (ref 1.6–2.6)
MCH RBC QN AUTO: 27.6 PG (ref 26.8–34.3)
MCHC RBC AUTO-ENTMCNC: 29.6 G/DL (ref 31.4–37.4)
MCV RBC AUTO: 93 FL (ref 82–98)
MONOCYTES # BLD AUTO: 0.61 THOUSAND/ΜL (ref 0.17–1.22)
MONOCYTES NFR BLD AUTO: 8 % (ref 4–12)
NEUTROPHILS # BLD AUTO: 5.82 THOUSANDS/ΜL (ref 1.85–7.62)
NEUTS SEG NFR BLD AUTO: 77 % (ref 43–75)
NRBC BLD AUTO-RTO: 0 /100 WBCS
PHOSPHATE SERPL-MCNC: 6.1 MG/DL (ref 2.3–4.1)
PLATELET # BLD AUTO: 187 THOUSANDS/UL (ref 149–390)
PMV BLD AUTO: 10.9 FL (ref 8.9–12.7)
POTASSIUM SERPL-SCNC: 4.7 MMOL/L (ref 3.5–5.3)
RBC # BLD AUTO: 2.57 MILLION/UL (ref 3.81–5.12)
SODIUM SERPL-SCNC: 145 MMOL/L (ref 136–145)
VANCOMYCIN SERPL-MCNC: 13.5 UG/ML
WBC # BLD AUTO: 7.61 THOUSAND/UL (ref 4.31–10.16)

## 2021-03-17 PROCEDURE — 84100 ASSAY OF PHOSPHORUS: CPT | Performed by: INTERNAL MEDICINE

## 2021-03-17 PROCEDURE — 76770 US EXAM ABDO BACK WALL COMP: CPT

## 2021-03-17 PROCEDURE — 80048 BASIC METABOLIC PNL TOTAL CA: CPT | Performed by: INTERNAL MEDICINE

## 2021-03-17 PROCEDURE — 83735 ASSAY OF MAGNESIUM: CPT | Performed by: INTERNAL MEDICINE

## 2021-03-17 PROCEDURE — 82948 REAGENT STRIP/BLOOD GLUCOSE: CPT

## 2021-03-17 PROCEDURE — 80202 ASSAY OF VANCOMYCIN: CPT | Performed by: PHYSICIAN ASSISTANT

## 2021-03-17 PROCEDURE — 85025 COMPLETE CBC W/AUTO DIFF WBC: CPT | Performed by: INTERNAL MEDICINE

## 2021-03-17 PROCEDURE — 99232 SBSQ HOSP IP/OBS MODERATE 35: CPT | Performed by: INTERNAL MEDICINE

## 2021-03-17 PROCEDURE — 99233 SBSQ HOSP IP/OBS HIGH 50: CPT | Performed by: INTERNAL MEDICINE

## 2021-03-17 RX ORDER — DIPHENHYDRAMINE HCL 25 MG
25 TABLET ORAL EVERY 6 HOURS PRN
Status: DISCONTINUED | OUTPATIENT
Start: 2021-03-17 | End: 2021-04-05 | Stop reason: HOSPADM

## 2021-03-17 RX ORDER — BUMETANIDE 0.25 MG/ML
3 INJECTION, SOLUTION INTRAMUSCULAR; INTRAVENOUS ONCE
Status: COMPLETED | OUTPATIENT
Start: 2021-03-17 | End: 2021-03-17

## 2021-03-17 RX ORDER — CALCIUM ACETATE 667 MG/1
667 CAPSULE ORAL 2 TIMES DAILY WITH MEALS
Status: DISCONTINUED | OUTPATIENT
Start: 2021-03-17 | End: 2021-03-20

## 2021-03-17 RX ORDER — BUMETANIDE 0.25 MG/ML
3 INJECTION, SOLUTION INTRAMUSCULAR; INTRAVENOUS EVERY 8 HOURS
Status: DISCONTINUED | OUTPATIENT
Start: 2021-03-17 | End: 2021-03-17

## 2021-03-17 RX ORDER — ALBUMIN (HUMAN) 12.5 G/50ML
12.5 SOLUTION INTRAVENOUS ONCE
Status: COMPLETED | OUTPATIENT
Start: 2021-03-17 | End: 2021-03-17

## 2021-03-17 RX ADMIN — HEPARIN SODIUM 5000 UNITS: 5000 INJECTION INTRAVENOUS; SUBCUTANEOUS at 05:09

## 2021-03-17 RX ADMIN — ASPIRIN 81 MG: 81 TABLET, COATED ORAL at 10:39

## 2021-03-17 RX ADMIN — ATORVASTATIN CALCIUM 40 MG: 40 TABLET, FILM COATED ORAL at 10:40

## 2021-03-17 RX ADMIN — HEPARIN SODIUM 5000 UNITS: 5000 INJECTION INTRAVENOUS; SUBCUTANEOUS at 21:08

## 2021-03-17 RX ADMIN — HYDRALAZINE HYDROCHLORIDE 75 MG: 25 TABLET, FILM COATED ORAL at 10:39

## 2021-03-17 RX ADMIN — SODIUM BICARBONATE 650 MG TABLET 1300 MG: at 10:39

## 2021-03-17 RX ADMIN — CALCIUM ACETATE 667 MG: 667 CAPSULE ORAL at 17:03

## 2021-03-17 RX ADMIN — CEFEPIME HYDROCHLORIDE 2000 MG: 2 INJECTION, SOLUTION INTRAVENOUS at 21:11

## 2021-03-17 RX ADMIN — VANCOMYCIN HYDROCHLORIDE 500 MG: 500 INJECTION, POWDER, LYOPHILIZED, FOR SOLUTION INTRAVENOUS at 16:55

## 2021-03-17 RX ADMIN — ACETAMINOPHEN 650 MG: 325 TABLET, FILM COATED ORAL at 23:20

## 2021-03-17 RX ADMIN — ACETAMINOPHEN 650 MG: 325 TABLET, FILM COATED ORAL at 15:34

## 2021-03-17 RX ADMIN — INSULIN GLARGINE 38 UNITS: 100 INJECTION, SOLUTION SUBCUTANEOUS at 10:42

## 2021-03-17 RX ADMIN — BUMETANIDE 3 MG: 0.25 INJECTION, SOLUTION INTRAMUSCULAR; INTRAVENOUS at 13:31

## 2021-03-17 RX ADMIN — CARVEDILOL 25 MG: 12.5 TABLET, FILM COATED ORAL at 10:39

## 2021-03-17 RX ADMIN — CARVEDILOL 25 MG: 12.5 TABLET, FILM COATED ORAL at 17:03

## 2021-03-17 RX ADMIN — ALBUMIN (HUMAN) 12.5 G: 0.25 INJECTION, SOLUTION INTRAVENOUS at 19:28

## 2021-03-17 RX ADMIN — HYDRALAZINE HYDROCHLORIDE 75 MG: 25 TABLET, FILM COATED ORAL at 22:02

## 2021-03-17 RX ADMIN — INSULIN LISPRO 1 UNITS: 100 INJECTION, SOLUTION INTRAVENOUS; SUBCUTANEOUS at 13:47

## 2021-03-17 RX ADMIN — HEPARIN SODIUM 5000 UNITS: 5000 INJECTION INTRAVENOUS; SUBCUTANEOUS at 13:38

## 2021-03-17 RX ADMIN — CLOPIDOGREL BISULFATE 75 MG: 75 TABLET ORAL at 10:40

## 2021-03-17 RX ADMIN — SODIUM BICARBONATE 650 MG TABLET 1300 MG: at 13:42

## 2021-03-17 RX ADMIN — BUMETANIDE 3 MG: 0.25 INJECTION INTRAMUSCULAR; INTRAVENOUS at 21:08

## 2021-03-17 RX ADMIN — DIPHENHYDRAMINE HYDROCHLORIDE 25 MG: 25 TABLET ORAL at 05:45

## 2021-03-17 NOTE — PROGRESS NOTES
Post bed bath, patient had one episode of itching and scratched off multiple scabs on left arm and chest  Scabs are bleeding and open to air  Cleansed and placed gauze over wounds  Provider Nuzhat Lamb notified, administered benadryl PRN for itching  Patient resting in bed  Will continue to monitor   Tana Connelly RN

## 2021-03-17 NOTE — NUTRITION
03/17/21 1659   Assessment   Timepoint Nutrition Review   Adequacy of Intake   Nutrition Modality PO  (CCD2, 2 gm NA+)   Current Meal Intake %; Adequate   Estimated Calorie Intake %   Estimated Protein Intake  %   Estimated Fluid Intake %   Estimated calorie intake compared to estimated need Pt eating well  Nutrition Prognosis   Nutrition Considerations   (diet education: discussed current diet restrictions CCD2, 2 GM NA+  Encouraged compliance with meds )   Recommendations/Interventions   Summary Pt with food complaints re: preparation of vegetables and othre menu items  Have discussed with kitchen     Interventions Diet: continued as ordered   Nutrition Recommendations Continue diet as ordered   Nutrition Complexity Risk   Nutrition complexity level Moderate risk   Nutrition review: 03/23/21   Follow up date 03/23/21  (po intake, diet ed questions)

## 2021-03-17 NOTE — ASSESSMENT & PLAN NOTE
· Patient initially came into the ED with diapers wrapped around both of her legs and reported left heel pain over the last few days  Patient noted to have open wounds on both right and left leg with signs of cellulitis  · About 1 year ago patient reports she had been seeing wound care but stopped due to her wounds improving and fear of jennifer COVID-19     · Leukocytosis resolved  · Follow-up culture results  · ED started patient on Cefepime and originally had been transitioned to Ancef, will broadened to cefepime/vancomycin due to diabetic ulcer with drainage  · Consult podiatry

## 2021-03-17 NOTE — ASSESSMENT & PLAN NOTE
· Hemoglobin 8 3   · No signs of active bleeding  · Hemoglobin this morning is 7 1  · Monitor H&H and transfuse as needed  · Baseline from about 1 year ago about 9  · Continue to monitor

## 2021-03-17 NOTE — PROGRESS NOTES
[place]  Progress Note - Leigh Drake 1955, 60 y o  female MRN: [id number]  Unit/Bed#: -Uday Encounter: [id number]  Primary Care Provider: Kamari Spann DO   Date and time admitted to hospital: 3/15/2021  7:09 PM    Increased anion gap metabolic acidosis  Assessment & Plan  · Likely due to worsening renal function  · Appreciate nephrology input - on sodium bicarbonate tablets  · Continue to trend BMP    Foot ulcer, left ([address] )  Assessment & Plan  · Patient with ulcer on left heel of the foot  · Patient is unaware when ulcer started but notes the rash on her lower extremities began about 6 weeks ago  · Patient reports increased pain with touch and walking over the past week  · Patient with poor compliance of diabetes insulin  · Continue IV cefepime/vancomycin  · XR left foot completed on 03/15 with calcaneal spurring, no acute osseous abnormality  · Podiatry consult    Acute kidney injury superimposed on chronic kidney disease [facility])  Assessment & Plan  Lab Results   Component Value Date    EGFR 13 03/17/2021    EGFR 14 03/16/2021    EGFR 14 03/16/2021    CREATININE 3 57 (H) 03/17/2021    CREATININE 3 31 (H) 03/16/2021    CREATININE 3 33 (H) 03/16/2021     · Creatinine 3 23 on admission   · Creatinine around 2 06 1 year ago  · Consult Nephrology - appreciate recommendations  · Patient received IV albumin x1 in addition to 80 mg IV Lasix x1 - may have cardiorenal component given noncompliance with torsemide  · Creatinine this morning is 3 57  · Diuretics per Nephrology  · Ultrasound did not reveal any hydronephrosis  · Avoid hypotension/nephrotoxins medication  · Trend BMP    Non compliance w medication regimen  Assessment & Plan  · Patient reports she stopped taking her insulin around Thanksgiving 2020, her torsemide about 1 week ago and has not been seeing wound care for her legs,   · Patient had been seeing wound care for wounds of her bilateral lower extremities but stopped going due to the fear of jennifer Covid-19  · consult case management    Cellulitis of both lower extremities  Assessment & Plan  · Patient initially came into the ED with diapers wrapped around both of her legs and reported left heel pain over the last few days  Patient noted to have open wounds on both right and left leg with signs of cellulitis  · About 1 year ago patient reports she had been seeing wound care but stopped due to her wounds improving and fear of jennifer COVID-19  · Leukocytosis resolved  · Follow-up culture results  · ED started patient on Cefepime and originally had been transitioned to Ancef, will broadened to cefepime/vancomycin due to diabetic ulcer with drainage  · Consult podiatry      Anemia  Assessment & Plan  · Hemoglobin 8 3   · No signs of active bleeding  · Hemoglobin this morning is 7 1  · Monitor H&H and transfuse as needed  · Baseline from about 1 year ago about 9  · Continue to monitor    Type 2 diabetes mellitus with hyperglycemia, with long-term current use of insulin [facility])  Assessment & Plan  Lab Results   Component Value Date    HGBA1C 7 6 (H) 03/15/2021       Recent Labs     03/15/21  2328 03/16/21  0828 03/16/21  1106 03/16/21  1650   POCGLU 153* 155* 111 123       Blood Sugar Average: Last 72 hrs:  · (P) 135 5Patient with blood sugar of 202 on admission  · Patient stopped taking insulin around Thanksgiving 2020  At that time patient was taking insulin glargine 38 units in the morning and sliding scale insulin with meals  · Continue on insulin glargine 38 units in the morning  · Hemoglobin A1c 7 6  · SSI plus Accu-Chek  · Diabetic diet    Essential hypertension  Assessment & Plan  · Continue home carvedilol 25 mg b i d , hydralazine 75 mg b i d   · Continue to monitor blood pressure per unit protocol    Class 3 severe obesity with body mass index (BMI) of 45 0 to 49 9 in adult [facility])  Assessment & Plan  Body mass index is 44 82 kg/m²    · Encourage lifestyle changes  · Consult nutrition    * Acute on chronic congestive heart failure (HCC)  Assessment & Plan  Wt Readings from Last 3 Encounters:   03/17/21 118 kg (261 lb 1 6 oz)   02/21/20 117 kg (258 lb)   01/07/20 117 kg (258 lb 12 8 oz)     · Noted to have lower extremity edema  · Patient stopped taking home torsemide about 1 week ago due to trouble with ambulation  · In the ED given torsemide p o  40 mg  · Originally reordered for torsemide 40 mg daily, per Nephrology, received IV albumin x1 in addition to 80 mg IV Lasix x1 on 03/16  · BNP 21,507 on admission  · Last echo 01/08/2020:  EF 55%  · Echo showed ejection fraction of 45-50% with grade 2 diastolic dysfunction  · Place patient on telemetry  · Daily weights and I/O  · Low-sodium diet  · Nephrology on board      Labs & Imaging: I have personally reviewed pertinent reports  VTE Pharmacologic Prophylaxis: Heparin  VTE Mechanical Prophylaxis: sequential compression device    Code Status:   Level 1 - Full Code    Patient Centered Rounds: I have performed bedside rounds with nursing staff today  Discussions with Specialists or Other Care Team Provider:  Nephrology    Education and Discussions with Family / Patient:  Patient states she will update her daughter  Current Length of Stay: 2 day(s)    Current Patient Status: Inpatient   Certification Statement: The patient will continue to require additional inpatient hospital stay due to see my assessment and plan  Subjective:   Patient is seen and examined at bedside  Denies any new complaints  States her pain in the lower extremities is improved  Afebrile  All other ROS are negative  Objective:    Vitals: Blood pressure 115/65, pulse 72, temperature (!) 97 4 °F (36 3 °C), resp  rate 20, height 5' 4" (1 626 m), weight 118 kg (261 lb 1 6 oz), SpO2 91 %, not currently breastfeeding  ,Body mass index is 44 82 kg/m²    SPO2 RA Rest      ED to Hosp-Admission (Current) from 3/15/2021 in [place]  [facility]urg [facility]   SpO2  91 %   SpO2 Activity  At Rest   O2 Device  None (Room air)   O2 Flow Rate  --        I&O:     Intake/Output Summary (Last 24 hours) at 3/17/2021 0654  Last data filed at 3/17/2021 0504  Gross per 24 hour   Intake 1301 ml   Output 1025 ml   Net 276 ml       Physical Exam:    General- Alert, sitting comfortably in chair  Not in any acute distress  Neck- Supple, No JVD  CVS- regular, S1 and S2 normal   Chest- Bilateral Air entry, diminished at bases  Abdomen- soft, obese, nontender, not distended, no guarding or rigidity, BS+  Extremities-  has pedal edema, No calf tenderness  Bilateral lower extremity in Ace wrap  CNS-   Alert, awake and orientedx3  No focal deficits present  Invasive Devices     Peripheral Intravenous Line            Peripheral IV 03/15/21 Left Antecubital 1 day    Peripheral IV 03/16/21 Dorsal (posterior); Right Forearm less than 1 day                      Social History  reviewed  History reviewed  No pertinent family history   reviewed    Meds:  Current Facility-Administered Medications   Medication Dose Route Frequency Provider Last Rate Last Admin    acetaminophen (TYLENOL) tablet 650 mg  650 mg Oral Q6H PRN Heidi Negron PA-C        aspirin (ECOTRIN LOW STRENGTH) EC tablet 81 mg  81 mg Oral Daily Sarah Sanchez PA-C   81 mg at 03/16/21 1048    atorvastatin (LIPITOR) tablet 40 mg  40 mg Oral Daily Sarah Sanchez PA-C   40 mg at 03/16/21 1047    carvedilol (COREG) tablet 25 mg  25 mg Oral BID Heidi Negron PA-C   25 mg at 03/16/21 1801    cefepime (MAXIPIME) IVPB (premix in dextrose) 2,000 mg 50 mL  2,000 mg Intravenous Q24H Cat Mireles PA-C 100 mL/hr at 03/16/21 2209 2,000 mg at 03/16/21 2209    clopidogrel (PLAVIX) tablet 75 mg  75 mg Oral Daily Sarah Sanchez PA-C   75 mg at 03/16/21 1048    diphenhydrAMINE (BENADRYL) tablet 25 mg  25 mg Oral Q6H PRN Heidi Negron PA-C   25 mg at 03/17/21 0545    heparin (porcine) subcutaneous injection 5,000 Units  5,000 Units Subcutaneous [facility] Brianna Collado PA-C   5,000 Units at 03/17/21 2361    hydrALAZINE (APRESOLINE) tablet 75 mg  75 mg Oral BID Brianna Collado PA-C   75 mg at 03/16/21 2208    insulin glargine (LANTUS) subcutaneous injection 38 Units 0 38 mL  38 Units Subcutaneous QAM Sarah Sanchez PA-C   38 Units at 03/16/21 1050    insulin lispro (HumaLOG) 100 units/mL subcutaneous injection 1-6 Units  1-6 Units Subcutaneous TID AC Sarah Sanchez PA-C        insulin lispro (HumaLOG) 100 units/mL subcutaneous injection 1-6 Units  1-6 Units Subcutaneous HS Brianna Collado PA-C   1 Units at 03/15/21 2329    sodium bicarbonate tablet 1,300 mg  1,300 mg Oral TID after meals HOWARD Puente   1,300 mg at 03/16/21 1801    vancomycin (VANCOCIN) IVPB (premix in dextrose) 750 mg 150 mL  10 mg/kg (Adjusted) Intravenous Q24H Taisha Mccormick PA-C          Medications Prior to Admission   Medication    aspirin (ECOTRIN LOW STRENGTH) 81 mg EC tablet    atorvastatin (LIPITOR) 40 mg tablet    carvedilol (COREG) 25 mg tablet    clopidogrel (PLAVIX) 75 mg tablet    hydrALAZINE (APRESOLINE) 50 mg tablet    torsemide (DEMADEX) 20 mg tablet    amLODIPine (NORVASC) 2 5 mg tablet    insulin aspart (NovoLOG) 100 units/mL injection    insulin glargine (Toujeo SoloStar) 300 units/mL CONCETRATED U-300 injection pen (1-unit dial)    nitroglycerin (NITROSTAT) 0 4 mg SL tablet       Labs:  Results from last 7 days   Lab Units 03/17/21  0421 03/16/21  1122 03/16/21  0522 03/15/21  1933   WBC Thousand/uL 7 61  --  12 44* 11 01*   HEMOGLOBIN g/dL 7 1*  --  8 3* 8 1*   HEMATOCRIT % 24 0*  --  28 2* 27 0*   PLATELETS Thousands/uL 187 204 229 223   NEUTROS PCT % 77*  --  88* 77*   LYMPHS PCT % 9*  --  5* 7*   MONOS PCT % 8  --  4 6   EOS PCT % 6  --  2 10*     Results from last 7 days   Lab Units 03/17/21  0421 03/16/21  1122 03/16/21  0522 03/15/21  1933   POTASSIUM mmol/L 4 7 5 0  5 1 5 5* 4 8   CHLORIDE mmol/L 115* 114* 114* 113*   CO2 mmol/L 17* 16* 14* 18*   BUN mg/dL 74* 71* 69* 72*   CREATININE mg/dL 3 57* 3 31* 3 33* 3 23*   CALCIUM mg/dL 7 6* 7 5* 7 5* 7 5*   ALK PHOS U/L  --   --   --  99   ALT U/L  --   --   --  19   AST U/L  --   --   --  12     Lab Results   Component Value Date    TROPONINI <0 02 03/15/2021    TROPONINI 0 02 01/07/2020    TROPONINI <0 02 05/11/2017    CKTOTAL 53 04/07/2014     Results from last 7 days   Lab Units 03/15/21  1933   INR  1 23*     Lab Results   Component Value Date    BLOODCX Received in Microbiology Lab  Culture in Progress  03/15/2021    BLOODCX Received in Microbiology Lab  Culture in Progress  03/15/2021         Imaging:  Results for orders placed during the hospital encounter of 03/15/21   XR chest 1 view portable    Narrative CHEST     INDICATION:   weakness  COMPARISON:  Chest radiograph from 1/7/2020 and 5/11/2017  EXAM PERFORMED/VIEWS:  XR CHEST PORTABLE  FINDINGS:    Heart size exaggerated by the portable projection and suboptimal inspiration  Lungs clear  No effusion or pneumothorax  Osseous structures normal for age  Impression No acute cardiopulmonary disease  Workstation performed: [id number]       Results for orders placed during the hospital encounter of 01/07/20   XR chest 2 views    Narrative CHEST     INDICATION:   Chest Pain  COMPARISON:  5/11/2017    EXAM PERFORMED/VIEWS:  XR CHEST PA & LATERAL      FINDINGS:    Cardiomediastinal silhouette appears unremarkable  Crowding of the pulmonary markings secondary to shallow inspiration  Grossly clear lungs  No pneumothorax or pleural effusion  Osseous structures appear within normal limits for patient age  Impression No acute cardiopulmonary disease          Workstation performed: [id number]         Last 24 Hours Medication List:   Current Facility-Administered Medications   Medication Dose Route Frequency Provider Last Rate    acetaminophen  650 mg Oral Q6H PRN Anabelle Asher PA-C      aspirin  81 mg Oral Daily Nj Chen PA-C      atorvastatin  40 mg Oral Daily Nj Chen PA-C      carvedilol  25 mg Oral BID Nj Chen PA-C      cefepime  2,000 mg Intravenous Q24H Szuanna Miller PA-C 2,000 mg (03/16/21 2209)    clopidogrel  75 mg Oral Daily Nj Chen PA-C      diphenhydrAMINE  25 mg Oral Q6H PRN Nj Chen PA-C      heparin (porcine)  5,000 Units Subcutaneous [facility] Nj Chen PA-C      hydrALAZINE  75 mg Oral BID Nj Chen PA-C      insulin glargine  38 Units Subcutaneous QAM Nj Chen PA-C      insulin lispro  1-6 Units Subcutaneous TID AC Sarah Sanchez PA-C      insulin lispro  1-6 Units Subcutaneous HS Sarah Sanchez PA-C      sodium bicarbonate  1,300 mg Oral TID after meals HOWARD Puente      vancomycin  10 mg/kg (Adjusted) Intravenous Q24H Jenny Weathers PA-C          Today, Patient Was Seen By: Pernell Calle MD    ** Please Note: Dictation voice to text software may have been used in the creation of this document   **

## 2021-03-17 NOTE — ASSESSMENT & PLAN NOTE
· Continue home carvedilol 25 mg b i d , hydralazine 75 mg b i d   · Continue to monitor blood pressure per unit protocol

## 2021-03-17 NOTE — ASSESSMENT & PLAN NOTE
· Likely due to worsening renal function  · Appreciate nephrology input - on sodium bicarbonate tablets  · Continue to trend BMP

## 2021-03-17 NOTE — ASSESSMENT & PLAN NOTE
Lab Results   Component Value Date    HGBA1C 7 6 (H) 03/15/2021       Recent Labs     03/15/21  2328 03/16/21  0828 03/16/21  1106 03/16/21  1650   POCGLU 153* 155* 111 123       Blood Sugar Average: Last 72 hrs:  · (P) 135 5Patient with blood sugar of 202 on admission  · Patient stopped taking insulin around Thanksgiving 2020  At that time patient was taking insulin glargine 38 units in the morning and sliding scale insulin with meals     · Continue on insulin glargine 38 units in the morning  · Hemoglobin A1c 7 6  · SSI plus Accu-Chek  · Diabetic diet

## 2021-03-17 NOTE — PROGRESS NOTES
NEPHROLOGY PROGRESS NOTE   Kristine Crowley 60 y o  female MRN: [id number]  Unit/Bed#: -01 Encounter: [id number]      ASSESSMENT/PLAN:  Acute kidney injury (POA) on chronic kidney disease, stage IV vs progression of chronic kidney disease, stage IV:  - etiology suspect secondary to cardiorenal syndrome, underlying cellulitis, ATN versus overall progression of underlying chronic kidney disease   - per review, note reviewed from [facility], but appears patient has not followed with nephrology in the past    - upon review of medical records, creatinine 2- 2 1 mg/dL in 2020   - creatinine 3 23 mg/dL upon admission on 3/15   - most recent creatinine 3 57 mg/dL today  - status post albumin 25 g + furosemide 80 mg IV yesterday, 3/16/21    - will provide Bumex 3 mg IV every 8 hours, order placed for 3 doses for now, will adjust as needed  - UA: 250 glucose, greater than 300 protein, 10-20 WBC, moderate bacteria, 4-10 hyaline casts, 5-10 fine granular casts  - imaging:  Renal ultrasound revealed right kidney 11 9 x 4 2 x 6 6 cm, left kidney 11 8 x 5 4 x 5 6 cm, normal echogenicity and contour bilaterally, no hydronephrosis bilaterally  - maintain urinary retention protocol   - no indication for emergent renal replacement therapy  Discussed dialysis with patient, at present, patient is unsure if she would want to pursue dialysis if warranted  - avoid NSAIDs, nephrotoxic agents, IV contrast   - adjust medications to appropriate GFR  - monitor volume status with strict intake/output, daily weight  - patient without robust response to Lasix, provide Bumex as above and monitor intake/output very closely as well as weights  Patient is voiding independently  If patient is unable to void independently, utilize purwik    - check BMP in a m      Electrolytes, acid/base:  - hyperkalemia improving with most recent potassium 4 7 post medical management, diuretics as above, continue strict low-potassium diet  - suspected anion gap acidosis improving with most recent bicarbonate 17 and anion gap of 13, continue sodium bicarbonate supplements 2 tab three times daily and adjust supplements as needed pending repeat lab studies  - will continue to monitor with repeat lab studies  Hyperphosphatemia:  - most recent phosphorus 6 1, will initiate calcium acetate 667 mg twice daily with meals for now  - check phosphorus in a m      Hypertension:  - blood pressure with elevations and fluctuations  - outpatient regimen includes: Carvedilol 25 mg b i d , hydralazine 75 mg b i d , torsemide 40 mg daily, amlodipine 2 5 mg daily  - currently on: Carvedilol 25 mg b i d , hydralazine 75 mg b i d   - recommendations: provide IV diuretics as above  - optimize hemodynamics; avoid hypotension and fluctuations of blood pressure   - maintain MAP > 65       Anemia of chronic kidney disease:  - most recent hemoglobin 7 1 grams/deciliter   - goal hemoglobin greater than 8 grams/deciliter  - recommend PRBC transfusion for hemoglobin less than 7    - avoid IV Venofer in light of infection  - check FOBT  Acute on chronic CHF:  - chest x-ray completed upon admission revealed no acute cardiopulmonary disease, lungs clear with no effusion or pneumothorax   -  most recent echo completed on 03/16 revealed EF of 12-14%, grade 2 diastolic dysfunction, mild-to-moderate aortic stenosis, dilated IVC  - diuretics as above  - continue to monitor weight, intake/output closely      Cellulitis of bilateral lower extremities, on antibiotics per primary team, follow-up blood cultures, no growth at 24 hours      DM type 2, most recent hemoglobin A1c 7 4, on insulin per primary team      SUBJECTIVE:  Patient is resting in chair  Patient is without current shortness of breath or chest pain  Patient states she does not have nausea today, did have nausea yesterday    She feels as though she is not eating as well      OBJECTIVE:  Current Weight: Weight - Scale: 118 kg (261 lb 1 6 oz)  Vitals:    03/17/21 0805   BP: 145/64   Pulse: 70   Resp: 19   Temp: 98 2 °F (36 8 °C)   SpO2: 91%       Intake/Output Summary (Last 24 hours) at 3/17/2021 1126  Last data filed at 3/17/2021 1631  Gross per 24 hour   Intake 801 ml   Output 1025 ml   Net -224 ml       General:  No acute distress  Skin:  Warm, no rash  Eyes:  Sclerae anicteric  ENT:  Moist lips and mucous membranes  Neck:  Supple trachea midline  Chest:  Diminished breath sounds   CVS:  Regular rate regular rhythm  Abdomen:  Obese, rounded  Extremities:  Bilateral lower extremity edema present, Ace wraps in place   Neuro:  Awake and interactive  Psych:  Appropriate affect      Medications:  Scheduled Meds:  Current Facility-Administered Medications   Medication Dose Route Frequency Provider Last Rate    acetaminophen  650 mg Oral Q6H PRN Yue Daniel, PA-C      aspirin  81 mg Oral Daily Yue Daniel, PA-C      atorvastatin  40 mg Oral Daily Yue Daniel, PA-RAGHAVENDRA      bumetanide  3 mg Intravenous Q8H HOWARD Puente      carvedilol  25 mg Oral BID Yue Daniel, PA-RAGHAVENDRA      cefepime  2,000 mg Intravenous Q24H [place] [employer] AMBER Newman 2,000 mg (03/16/21 2209)    clopidogrel  75 mg Oral Daily Yue Daniel, PA-C      diphenhydrAMINE  25 mg Oral Q6H PRN Yue Daniel, PA-C      heparin (porcine)  5,000 Units Subcutaneous [facility] Yue Daniel, PA-RAGHAVENDRA      hydrALAZINE  75 mg Oral BID Yue Daniel, PA-RAGHAVENDRA      insulin glargine  38 Units Subcutaneous QAM Yue DanielHARLAN pittman-RAGHAVENDRA      insulin lispro  1-6 Units Subcutaneous TID AC Sarah Sanchez PA-C      insulin lispro  1-6 Units Subcutaneous HS Sarah Sanchez PA-C      sodium bicarbonate  1,300 mg Oral TID after meals HOWARD Puente      vancomycin  10 mg/kg (Adjusted) Intravenous Q24H [place] [employer] HARLAN Newman-RAGHAVENDRA         PRN Meds:   acetaminophen    diphenhydrAMINE    Continuous Infusions:     Laboratory Results:  Results from last 7 days   Lab Units 03/17/21  0421 03/16/21  1122 03/16/21  0522 03/15/21  1933   WBC Thousand/uL 7 61  --  12 44* 11 01*   HEMOGLOBIN g/dL 7 1*  --  8 3* 8 1*   HEMATOCRIT % 24 0*  --  28 2* 27 0*   PLATELETS Thousands/uL 187 204 229 223   SODIUM mmol/L 145 143 145 143   POTASSIUM mmol/L 4 7 5 0  5 1 5 5* 4 8   CHLORIDE mmol/L 115* 114* 114* 113*   CO2 mmol/L 17* 16* 14* 18*   BUN mg/dL 74* 71* 69* 72*   CREATININE mg/dL 3 57* 3 31* 3 33* 3 23*   CALCIUM mg/dL 7 6* 7 5* 7 5* 7 5*   MAGNESIUM mg/dL 1 8  --   --   --    PHOSPHORUS mg/dL 6 1*  --   --   --

## 2021-03-17 NOTE — ASSESSMENT & PLAN NOTE
Lab Results   Component Value Date    EGFR 13 03/17/2021    EGFR 14 03/16/2021    EGFR 14 03/16/2021    CREATININE 3 57 (H) 03/17/2021    CREATININE 3 31 (H) 03/16/2021    CREATININE 3 33 (H) 03/16/2021     · Creatinine 3 23 on admission   · Creatinine around 2 06 1 year ago  · Consult Nephrology - appreciate recommendations  · Patient received IV albumin x1 in addition to 80 mg IV Lasix x1 - may have cardiorenal component given noncompliance with torsemide  · Creatinine this morning is 3 57  · Diuretics per Nephrology  · Ultrasound did not reveal any hydronephrosis  · Avoid hypotension/nephrotoxins medication  · Trend BMP

## 2021-03-17 NOTE — PROGRESS NOTES
Vancomycin IV Pharmacy-to-Dose Consultation    Erick García is a 60 y o  female who is currently receiving Vancomycin IV with management by the Pharmacy Consult service  Assessment/Plan:  The patient was reviewed  No signs or symptoms of nephrotoxicity and/or infusion reactions were documented in the chart  Scr is trending up and now @ 3 57 mg/dL today  Based on todays assessment will change vancomycin (day #2) dosing to 500mg IV Q24H  Plan to check trough level @ 1530 on 03/19/21  We will continue to follow the patients culture results and clinical progress daily      Deepa Downing, Pharmacist

## 2021-03-17 NOTE — PLAN OF CARE
Problem: Potential for Falls  Goal: Patient will remain free of falls  Description: INTERVENTIONS:  - Assess patient frequently for physical needs  -  Identify cognitive and physical deficits and behaviors that affect risk of falls    -  [place] fall precautions as indicated by assessment   - Educate patient/family on patient safety including physical limitations  - Instruct patient to call for assistance with activity based on assessment  - Modify environment to reduce risk of injury  - Consider OT/PT consult to assist with strengthening/mobility  Outcome: Progressing     Problem: PAIN - ADULT  Goal: Verbalizes/displays adequate comfort level or baseline comfort level  Description: Interventions:  - Encourage patient to monitor pain and request assistance  - Assess pain using appropriate pain scale  - Administer analgesics based on type and severity of pain and evaluate response  - Implement non-pharmacological measures as appropriate and evaluate response  - Consider cultural and social influences on pain and pain management  - Notify physician/advanced practitioner if interventions unsuccessful or patient reports new pain  Outcome: Progressing     Problem: INFECTION - ADULT  Goal: Absence or prevention of progression during hospitalization  Description: INTERVENTIONS:  - Assess and monitor for signs and symptoms of infection  - Monitor lab/diagnostic results  - Monitor all insertion sites, i e  indwelling lines, tubes, and drains  - Monitor endotracheal if appropriate and nasal secretions for changes in amount and color  - [place] appropriate cooling/warming therapies per order  - Administer medications as ordered  - Instruct and encourage patient and family to use good hand hygiene technique  - Identify and instruct in appropriate isolation precautions for identified infection/condition  Outcome: Progressing  Goal: Absence of fever/infection during neutropenic period  Description: INTERVENTIONS:  - Monitor WBC    Outcome: Progressing     Problem: SAFETY ADULT  Goal: Patient will remain free of falls  Description: INTERVENTIONS:  - Assess patient frequently for physical needs  -  Identify cognitive and physical deficits and behaviors that affect risk of falls    -  [place] fall precautions as indicated by assessment   - Educate patient/family on patient safety including physical limitations  - Instruct patient to call for assistance with activity based on assessment  - Modify environment to reduce risk of injury  - Consider OT/PT consult to assist with strengthening/mobility  Outcome: Progressing  Goal: Maintain or return to baseline ADL function  Description: INTERVENTIONS:  -  Assess patient's ability to carry out ADLs; assess patient's baseline for ADL function and identify physical deficits which impact ability to perform ADLs (bathing, care of mouth/teeth, toileting, grooming, dressing, etc )  - Assess/evaluate cause of self-care deficits   - Assess range of motion  - Assess patient's mobility; develop plan if impaired  - Assess patient's need for assistive devices and provide as appropriate  - Encourage maximum independence but intervene and supervise when necessary  - Involve family in performance of ADLs  - Assess for home care needs following discharge   - Consider OT consult to assist with ADL evaluation and planning for discharge  - Provide patient education as appropriate  Outcome: Progressing  Goal: Maintain or return mobility status to optimal level  Description: INTERVENTIONS:  - Assess patient's baseline mobility status (ambulation, transfers, stairs, etc )    - Identify cognitive and physical deficits and behaviors that affect mobility  - Identify mobility aids required to assist with transfers and/or ambulation (gait belt, sit-to-stand, lift, walker, cane, etc )  - [place] fall precautions as indicated by assessment  - Record patient progress and toleration of activity level on Mobility SBAR; progress patient to next Phase/Stage  - Instruct patient to call for assistance with activity based on assessment  - Consider rehabilitation consult to assist with strengthening/weightbearing, etc   Outcome: Progressing     Problem: DISCHARGE PLANNING  Goal: Discharge to home or other facility with appropriate resources  Description: INTERVENTIONS:  - Identify barriers to discharge w/patient and caregiver  - Arrange for needed discharge resources and transportation as appropriate  - Identify discharge learning needs (meds, wound care, etc )  - Arrange for interpretive services to assist at discharge as needed  - Refer to Case Management Department for coordinating discharge planning if the patient needs post-hospital services based on physician/advanced practitioner order or complex needs related to functional status, cognitive ability, or social support system  Outcome: Progressing     Problem: Knowledge Deficit  Goal: Patient/family/caregiver demonstrates understanding of disease process, treatment plan, medications, and discharge instructions  Description: Complete learning assessment and assess knowledge base    Interventions:  - Provide teaching at level of understanding  - Provide teaching via preferred learning methods  Outcome: Progressing     Problem: CARDIOVASCULAR - ADULT  Goal: Maintains optimal cardiac output and hemodynamic stability  Description: INTERVENTIONS:  - Monitor I/O, vital signs and rhythm  - Monitor for S/S and trends of decreased cardiac output  - Administer and titrate ordered vasoactive medications to optimize hemodynamic stability  - Assess quality of pulses, skin color and temperature  - Assess for signs of decreased coronary artery perfusion  - Instruct patient to report change in severity of symptoms  Outcome: Progressing  Goal: Absence of cardiac dysrhythmias or at baseline rhythm  Description: INTERVENTIONS:  - Continuous cardiac monitoring, vital signs, obtain 12 lead EKG if ordered  - Administer antiarrhythmic and heart rate control medications as ordered  - Monitor electrolytes and administer replacement therapy as ordered  Outcome: Progressing     Problem: SKIN/TISSUE INTEGRITY - ADULT  Goal: Skin integrity remains intact  Description: INTERVENTIONS  - Identify patients at risk for skin breakdown  - Assess and monitor skin integrity  - Assess and monitor nutrition and hydration status  - Monitor labs (i e  albumin)  - Assess for incontinence   - Turn and reposition patient  - Assist with mobility/ambulation  - Relieve pressure over bony prominences  - Avoid friction and shearing  - Provide appropriate hygiene as needed including keeping skin clean and dry  - Evaluate need for skin moisturizer/barrier cream  - Collaborate with interdisciplinary team (i e  Nutrition, Rehabilitation, etc )   - Patient/family teaching  Outcome: Progressing  Goal: Incision(s), wounds(s) or drain site(s) healing without S/S of infection  Description: INTERVENTIONS  - Assess and document risk factors for skin impairment   - Assess and document dressing, incision, wound bed, drain sites and surrounding tissue  - Consider nutrition services referral as needed  - Oral mucous membranes remain intact  - Provide patient/ family education  Outcome: Progressing  Goal: Oral mucous membranes remain intact  Description: INTERVENTIONS  - Assess oral mucosa and hygiene practices  - Implement preventative oral hygiene regimen  - Implement oral medicated treatments as ordered  - Initiate Nutrition services referral as needed  Outcome: Progressing     Problem: Prexisting or High Potential for Compromised Skin Integrity  Goal: Skin integrity is maintained or improved  Description: INTERVENTIONS:  - Identify patients at risk for skin breakdown  - Assess and monitor skin integrity  - Assess and monitor nutrition and hydration status  - Monitor labs   - Assess for incontinence   - Turn and reposition patient  - Assist with mobility/ambulation  - Relieve pressure over bony prominences  - Avoid friction and shearing  - Provide appropriate hygiene as needed including keeping skin clean and dry  - Evaluate need for skin moisturizer/barrier cream  - Collaborate with interdisciplinary team   - Patient/family teaching  - Consider wound care consult   Outcome: Progressing     Problem: Nutrition/Hydration-ADULT  Goal: Nutrient/Hydration intake appropriate for improving, restoring or maintaining nutritional needs  Description: Monitor and assess patient's nutrition/hydration status for malnutrition  Collaborate with interdisciplinary team and initiate plan and interventions as ordered  Monitor patient's weight and dietary intake as ordered or per policy  Utilize nutrition screening tool and intervene as necessary  Determine patient's food preferences and provide high-protein, high-caloric foods as appropriate       INTERVENTIONS:  - Monitor oral intake, urinary output, labs, and treatment plans  - Assess nutrition and hydration status and recommend course of action  - Evaluate amount of meals eaten  - Assist patient with eating if necessary   - Allow adequate time for meals  - Recommend/ encourage appropriate diets, oral nutritional supplements, and vitamin/mineral supplements  - Order, calculate, and assess calorie counts as needed  - Recommend, monitor, and adjust tube feedings and TPN/PPN based on assessed needs  - Assess need for intravenous fluids  - Provide specific nutrition/hydration education as appropriate  - Include patient/family/caregiver in decisions related to nutrition  Outcome: Progressing

## 2021-03-17 NOTE — ASSESSMENT & PLAN NOTE
Wt Readings from Last 3 Encounters:   03/17/21 118 kg (261 lb 1 6 oz)   02/21/20 117 kg (258 lb)   01/07/20 117 kg (258 lb 12 8 oz)     · Noted to have lower extremity edema  · Patient stopped taking home torsemide about 1 week ago due to trouble with ambulation     · In the ED given torsemide p o  40 mg  · Originally reordered for torsemide 40 mg daily, per Nephrology, received IV albumin x1 in addition to 80 mg IV Lasix x1 on 03/16  · BNP 21,507 on admission  · Last echo 01/08/2020:  EF 55%  · Echo showed ejection fraction of 45-50% with grade 2 diastolic dysfunction  · Place patient on telemetry  · Daily weights and I/O  · Low-sodium diet  · Nephrology on board

## 2021-03-18 ENCOUNTER — APPOINTMENT (INPATIENT)
Dept: NON INVASIVE DIAGNOSTICS | Facility: HOSPITAL | Age: 66
DRG: 698 | End: 2021-03-18
Payer: COMMERCIAL

## 2021-03-18 LAB
ALBUMIN SERPL BCP-MCNC: 2.3 G/DL (ref 3.5–5)
ALP SERPL-CCNC: 82 U/L (ref 46–116)
ALT SERPL W P-5'-P-CCNC: 14 U/L (ref 12–78)
ANION GAP SERPL CALCULATED.3IONS-SCNC: 16 MMOL/L (ref 4–13)
AST SERPL W P-5'-P-CCNC: 12 U/L (ref 5–45)
BASOPHILS # BLD AUTO: 0.05 THOUSANDS/ΜL (ref 0–0.1)
BASOPHILS NFR BLD AUTO: 1 % (ref 0–1)
BILIRUB SERPL-MCNC: 0.3 MG/DL (ref 0.2–1)
BUN SERPL-MCNC: 75 MG/DL (ref 5–25)
C3 SERPL-MCNC: 104 MG/DL (ref 90–180)
C4 SERPL-MCNC: 20 MG/DL (ref 10–40)
CALCIUM ALBUM COR SERPL-MCNC: 8.8 MG/DL (ref 8.3–10.1)
CALCIUM SERPL-MCNC: 7.4 MG/DL (ref 8.3–10.1)
CHLORIDE SERPL-SCNC: 115 MMOL/L (ref 100–108)
CO2 SERPL-SCNC: 16 MMOL/L (ref 21–32)
CREAT SERPL-MCNC: 3.76 MG/DL (ref 0.6–1.3)
EOSINOPHIL # BLD AUTO: 1.11 THOUSAND/ΜL (ref 0–0.61)
EOSINOPHIL NFR BLD AUTO: 14 % (ref 0–6)
ERYTHROCYTE [DISTWIDTH] IN BLOOD BY AUTOMATED COUNT: 16.1 % (ref 11.6–15.1)
GFR SERPL CREATININE-BSD FRML MDRD: 12 ML/MIN/1.73SQ M
GLUCOSE SERPL-MCNC: 100 MG/DL (ref 65–140)
GLUCOSE SERPL-MCNC: 115 MG/DL (ref 65–140)
GLUCOSE SERPL-MCNC: 122 MG/DL (ref 65–140)
GLUCOSE SERPL-MCNC: 143 MG/DL (ref 65–140)
GLUCOSE SERPL-MCNC: 77 MG/DL (ref 65–140)
GLUCOSE SERPL-MCNC: 89 MG/DL (ref 65–140)
HCT VFR BLD AUTO: 24.7 % (ref 34.8–46.1)
HGB BLD-MCNC: 7.2 G/DL (ref 11.5–15.4)
IMM GRANULOCYTES # BLD AUTO: 0.02 THOUSAND/UL (ref 0–0.2)
IMM GRANULOCYTES NFR BLD AUTO: 0 % (ref 0–2)
LYMPHOCYTES # BLD AUTO: 0.94 THOUSANDS/ΜL (ref 0.6–4.47)
LYMPHOCYTES NFR BLD AUTO: 12 % (ref 14–44)
MAGNESIUM SERPL-MCNC: 1.9 MG/DL (ref 1.6–2.6)
MCH RBC QN AUTO: 27.4 PG (ref 26.8–34.3)
MCHC RBC AUTO-ENTMCNC: 29.1 G/DL (ref 31.4–37.4)
MCV RBC AUTO: 94 FL (ref 82–98)
MONOCYTES # BLD AUTO: 0.59 THOUSAND/ΜL (ref 0.17–1.22)
MONOCYTES NFR BLD AUTO: 8 % (ref 4–12)
NEUTROPHILS # BLD AUTO: 5.12 THOUSANDS/ΜL (ref 1.85–7.62)
NEUTS SEG NFR BLD AUTO: 65 % (ref 43–75)
NRBC BLD AUTO-RTO: 0 /100 WBCS
PHOSPHATE SERPL-MCNC: 5.6 MG/DL (ref 2.3–4.1)
PLATELET # BLD AUTO: 196 THOUSANDS/UL (ref 149–390)
PMV BLD AUTO: 10.4 FL (ref 8.9–12.7)
POTASSIUM SERPL-SCNC: 4.4 MMOL/L (ref 3.5–5.3)
PROT SERPL-MCNC: 6 G/DL (ref 6.4–8.2)
RBC # BLD AUTO: 2.63 MILLION/UL (ref 3.81–5.12)
SODIUM SERPL-SCNC: 147 MMOL/L (ref 136–145)
VANCOMYCIN SERPL-MCNC: 14.9 UG/ML
WBC # BLD AUTO: 7.83 THOUSAND/UL (ref 4.31–10.16)

## 2021-03-18 PROCEDURE — 82728 ASSAY OF FERRITIN: CPT | Performed by: INTERNAL MEDICINE

## 2021-03-18 PROCEDURE — 86705 HEP B CORE ANTIBODY IGM: CPT | Performed by: NURSE PRACTITIONER

## 2021-03-18 PROCEDURE — 85025 COMPLETE CBC W/AUTO DIFF WBC: CPT | Performed by: INTERNAL MEDICINE

## 2021-03-18 PROCEDURE — 87340 HEPATITIS B SURFACE AG IA: CPT | Performed by: NURSE PRACTITIONER

## 2021-03-18 PROCEDURE — 86038 ANTINUCLEAR ANTIBODIES: CPT | Performed by: NURSE PRACTITIONER

## 2021-03-18 PROCEDURE — 80202 ASSAY OF VANCOMYCIN: CPT | Performed by: INTERNAL MEDICINE

## 2021-03-18 PROCEDURE — 83735 ASSAY OF MAGNESIUM: CPT | Performed by: NURSE PRACTITIONER

## 2021-03-18 PROCEDURE — 86704 HEP B CORE ANTIBODY TOTAL: CPT | Performed by: NURSE PRACTITIONER

## 2021-03-18 PROCEDURE — 86160 COMPLEMENT ANTIGEN: CPT | Performed by: NURSE PRACTITIONER

## 2021-03-18 PROCEDURE — 97530 THERAPEUTIC ACTIVITIES: CPT

## 2021-03-18 PROCEDURE — 83540 ASSAY OF IRON: CPT | Performed by: INTERNAL MEDICINE

## 2021-03-18 PROCEDURE — 80053 COMPREHEN METABOLIC PANEL: CPT | Performed by: NURSE PRACTITIONER

## 2021-03-18 PROCEDURE — 99232 SBSQ HOSP IP/OBS MODERATE 35: CPT | Performed by: INTERNAL MEDICINE

## 2021-03-18 PROCEDURE — 84100 ASSAY OF PHOSPHORUS: CPT | Performed by: NURSE PRACTITIONER

## 2021-03-18 PROCEDURE — 83550 IRON BINDING TEST: CPT | Performed by: INTERNAL MEDICINE

## 2021-03-18 PROCEDURE — 82948 REAGENT STRIP/BLOOD GLUCOSE: CPT

## 2021-03-18 PROCEDURE — 86803 HEPATITIS C AB TEST: CPT | Performed by: NURSE PRACTITIONER

## 2021-03-18 PROCEDURE — 93970 EXTREMITY STUDY: CPT | Performed by: INTERNAL MEDICINE

## 2021-03-18 PROCEDURE — 99233 SBSQ HOSP IP/OBS HIGH 50: CPT | Performed by: INTERNAL MEDICINE

## 2021-03-18 PROCEDURE — 93970 EXTREMITY STUDY: CPT

## 2021-03-18 PROCEDURE — 97110 THERAPEUTIC EXERCISES: CPT

## 2021-03-18 RX ORDER — DEXTROSE MONOHYDRATE 50 MG/ML
50 INJECTION, SOLUTION INTRAVENOUS CONTINUOUS
Status: DISPENSED | OUTPATIENT
Start: 2021-03-18 | End: 2021-03-18

## 2021-03-18 RX ORDER — OXYCODONE HYDROCHLORIDE 5 MG/1
2.5 TABLET ORAL ONCE
Status: COMPLETED | OUTPATIENT
Start: 2021-03-18 | End: 2021-03-18

## 2021-03-18 RX ADMIN — CALCIUM ACETATE 667 MG: 667 CAPSULE ORAL at 19:13

## 2021-03-18 RX ADMIN — CARVEDILOL 25 MG: 12.5 TABLET, FILM COATED ORAL at 19:09

## 2021-03-18 RX ADMIN — DEXTROSE 50 ML/HR: 5 SOLUTION INTRAVENOUS at 13:48

## 2021-03-18 RX ADMIN — SODIUM BICARBONATE 650 MG TABLET 1300 MG: at 13:50

## 2021-03-18 RX ADMIN — ASPIRIN 81 MG: 81 TABLET, COATED ORAL at 08:32

## 2021-03-18 RX ADMIN — SODIUM BICARBONATE 650 MG TABLET 1300 MG: at 19:09

## 2021-03-18 RX ADMIN — HEPARIN SODIUM 5000 UNITS: 5000 INJECTION INTRAVENOUS; SUBCUTANEOUS at 05:44

## 2021-03-18 RX ADMIN — CLOPIDOGREL BISULFATE 75 MG: 75 TABLET ORAL at 08:33

## 2021-03-18 RX ADMIN — OXYCODONE HYDROCHLORIDE 2.5 MG: 5 TABLET ORAL at 00:34

## 2021-03-18 RX ADMIN — HYDRALAZINE HYDROCHLORIDE 75 MG: 25 TABLET, FILM COATED ORAL at 08:32

## 2021-03-18 RX ADMIN — CEFEPIME HYDROCHLORIDE 2000 MG: 2 INJECTION, SOLUTION INTRAVENOUS at 21:03

## 2021-03-18 RX ADMIN — CALCIUM ACETATE 667 MG: 667 CAPSULE ORAL at 08:32

## 2021-03-18 RX ADMIN — SODIUM BICARBONATE 650 MG TABLET 1300 MG: at 08:32

## 2021-03-18 RX ADMIN — HEPARIN SODIUM 5000 UNITS: 5000 INJECTION INTRAVENOUS; SUBCUTANEOUS at 14:12

## 2021-03-18 RX ADMIN — HYDRALAZINE HYDROCHLORIDE 75 MG: 25 TABLET, FILM COATED ORAL at 21:03

## 2021-03-18 RX ADMIN — CARVEDILOL 25 MG: 12.5 TABLET, FILM COATED ORAL at 08:33

## 2021-03-18 RX ADMIN — INSULIN GLARGINE 38 UNITS: 100 INJECTION, SOLUTION SUBCUTANEOUS at 08:37

## 2021-03-18 RX ADMIN — VANCOMYCIN HYDROCHLORIDE 500 MG: 500 INJECTION, POWDER, LYOPHILIZED, FOR SOLUTION INTRAVENOUS at 19:01

## 2021-03-18 RX ADMIN — ATORVASTATIN CALCIUM 40 MG: 40 TABLET, FILM COATED ORAL at 08:33

## 2021-03-18 RX ADMIN — HEPARIN SODIUM 5000 UNITS: 5000 INJECTION INTRAVENOUS; SUBCUTANEOUS at 21:02

## 2021-03-18 NOTE — ASSESSMENT & PLAN NOTE
· Hemoglobin 8 3   · No signs of active bleeding  · Hemoglobin this morning is 7 2  · Monitor H&H and transfuse as needed  · Baseline from about 1 year ago about 9  · Iron panel and stool for occult blood

## 2021-03-18 NOTE — ASSESSMENT & PLAN NOTE
Lab Results   Component Value Date    HGBA1C 7 6 (H) 03/15/2021       Recent Labs     03/17/21  1038 03/17/21  1524 03/17/21 2050 03/18/21  0753   POCGLU 178* 100 126 89       Blood Sugar Average: Last 72 hrs:  · (P) 124 9Patient with blood sugar of 202 on admission  · Patient stopped taking insulin around Thanksgiving 2020  At that time patient was taking insulin glargine 38 units in the morning and sliding scale insulin with meals     · Continue on insulin glargine 38 units in the morning  · Hemoglobin A1c 7 6  · SSI plus Accu-Chek  · Diabetic diet

## 2021-03-18 NOTE — CONSULTS
Consult - Podiatry   Sakina Mcrae 60 y o  female MRN: [id number]  Unit/Bed#: -01 Encounter: [id number]    Assessment/Plan     1  Skin ulceration left heel partial depth with DM2   -Dermagran gauze dressing applied left heel  2  Cellulitis bilateral legs   -secondary to #3     -resolving satisfactorily with current antibiotics  3  Venous hypertension with ulceration and inflammation bilateral legs   -multiple wounds bilateral legs  No purulent drainage noted  -dry sterile dressing bilateral legs with Xeroform, ABDs, Kerlix, and six-inch Ace wraps  4  Noncompliance with medication, anemia, ADI, essential hypertension, morbid obesity   -managed per Internal Medicine and Nephrology      History of Present Illness     HPI:  Sakina Mcrae is a 60 y o  female who presents with left heel pain and swollen lower legs and associated draining wounds  She was previously hospitalized a year ago but failed to follow up  Relates her heel pain present for about a week       Podiatry consult requested to evaluate left heel and leg wounds  Patient's chart reviewed noting all pertinent clinical laboratory data  Inpatient consult to Podiatry  Consult performed by: Tyrone Moreno DPM  Consult ordered by: Gilson Andrade PA-C        Review of Systems   Constitutional: Negative  HENT: Negative  Eyes: Negative  Respiratory: Negative  Cardiovascular:  CHF and acute kidney injury    Gastrointestinal: Negative      Musculoskeletal:  Morbidly obese with painful left heel   Skin:  Multiple open wounds   Neurological:  Negative        Historical Information   Past Medical History:   Diagnosis Date    Cardiac disease     [facility]est[facility] [address] [facility]     Coronary artery disease     Diabetes mellitus [facility] [address] [facility]     Hyperlipidemia     Hypertension     Renal disorder     TIA (transient ischemic attack)      Past Surgical History:   Procedure Laterality Date    CARDIAC SURGERY       SECTION      CORONARY ANGIOPLASTY WITH STENT PLACEMENT       Social History   Social History     Substance and Sexual Activity   Alcohol Use Never    Frequency: Never    Binge frequency: Never    Comment: socially     Social History     Substance and Sexual Activity   Drug Use No     Social History     Tobacco Use   Smoking Status Former Smoker    Years: 40 00   Smokeless Tobacco Never Used   Tobacco Comment    quit 1980     Family History: History reviewed  No pertinent family history      Meds/Allergies   Medications Prior to Admission   Medication    aspirin (ECOTRIN LOW STRENGTH) 81 mg EC tablet    atorvastatin (LIPITOR) 40 mg tablet    carvedilol (COREG) 25 mg tablet    clopidogrel (PLAVIX) 75 mg tablet    hydrALAZINE (APRESOLINE) 50 mg tablet    torsemide (DEMADEX) 20 mg tablet    amLODIPine (NORVASC) 2 5 mg tablet    insulin aspart (NovoLOG) 100 units/mL injection    insulin glargine (Toujeo SoloStar) 300 units/mL CONCETRATED U-300 injection pen (1-unit dial)    nitroglycerin (NITROSTAT) 0 4 mg SL tablet     Allergies   Allergen Reactions    Sodium Hypochlorite Other (See Comments)    Morphine     Penicillins        Objective   First Vitals:   Blood Pressure: (!) 179/71 (03/15/21 1908)  Pulse: 66 (03/15/21 1908)  Temperature: (!) 97 1 °F (36 2 °C) (03/15/21 1908)  Temp Source: Temporal (03/15/21 1908)  Respirations: 18 (03/15/21 1908)  Height: 5' 4" (162 6 cm) (03/15/21 2231)  Weight - Scale: 113 kg (250 lb) (03/15/21 1908)  SpO2: 95 % (03/15/21 1908)    Current Vitals:   Blood Pressure: 138/56 (03/18/21 0826)  Pulse: 66 (03/18/21 0826)  Temperature: 97 9 °F (36 6 °C) (03/18/21 0730)  Temp Source: Oral (03/17/21 1527)  Respirations: 19 (03/18/21 0730)  Height: 5' 4" (162 6 cm) (03/15/21 2231)  Weight - Scale: 117 kg (257 lb 8 oz) (03/18/21 0540)  SpO2: 96 % (03/18/21 0826)        /56   Pulse 66   Temp 97 9 °F (36 6 °C)   Resp 19   Ht 5' 4" (1 626 m)   Wt 117 kg (257 lb 8 oz)   SpO2 96%   BMI 44 20 kg/m²     General Appearance:    Alert, cooperative, no distress, resting comfortably in bed   Head:    Normocephalic, without obvious abnormality, atraumatic   Eyes:    PERRL, conjunctiva/corneas clear, EOM's intact            Nose:   Moist mucous membranes, no drainage or sinus tenderness   Throat:   No tenderness, no exudates   Neck:   Supple, symmetrical, trachea midline, no JVD   Back:     Symmetric, no CVA tenderness   Lungs:     Respirations unlabored   Chest wall:    No tenderness or deformity   Heart:    Rate and rhythm noted on last vitals  Abdomen:     Soft, non-tender, bowel sounds active all four quadrants,     no masses, no organomegaly       Lower Ext/Ortho: Edematous bilateral lower extremities  Neuro/Vasc: CNII-XII intact  Normal strength  Sensation and reflexes:  Diminished epicritic sensation  Pulses: Right: DP 0/4, PT 0/4, Left: DP 0/4, PT 0/4  Capillary Filling:  3 Sec, Edema:  + 2 - +3 edema bilateral     Skin: Texture, Tone and Turgor:  Diminished, Digital Hair:  None  Atrophic Changes: Moderate   Lesions:  None  Nail Pathology:  Multiple dystrophic nails consistent with mycosis  Ulcers/Wounds:   · Left heel:  3 0 x 3 0 x 0 1 cm partial depth ulcerative breakdown posterior lateral aspect, minimal serosanguineous drainage, evidence of old DD removed blister, 50% yellow, 50% pink/red, no evidence of acute infection  Pain palpation  · Bilateral legs:  Multiple partial depth venous hypertension ulcerations with moderate serosanguineous drainage  Erythema appears be diminishing with less calor noted  Edema has also been diminishing over the past 24 hours                         Lab Results:   CBC w/diff  Results from last 7 days   Lab Units 03/18/21  0529   WBC Thousand/uL 7 83   HEMOGLOBIN g/dL 7 2*   HEMATOCRIT % 24 7*   PLATELETS Thousands/uL 196   NEUTROS PCT % 65   LYMPHS PCT % 12*   MONOS PCT % 8   EOS PCT % 14*     BMP  Results from last 7 days   Lab Units 03/18/21  0530 POTASSIUM mmol/L 4 4   CHLORIDE mmol/L 115*   CO2 mmol/L 16*   BUN mg/dL 75*   CREATININE mg/dL 3 76*   CALCIUM mg/dL 7 4*     CMP  Results from last 7 days   Lab Units 03/18/21  0530   POTASSIUM mmol/L 4 4   CHLORIDE mmol/L 115*   CO2 mmol/L 16*   BUN mg/dL 75*   CREATININE mg/dL 3 76*   CALCIUM mg/dL 7 4*   ALK PHOS U/L 82   ALT U/L 14   AST U/L 12     @Culture@  Lab Results   Component Value Date    BLOODCX No Growth at 48 hrs  03/15/2021    BLOODCX No Growth at 48 hrs  03/15/2021    Ferol Persons <10,000 cfu/ml  03/16/2021     No results found for: WOUNDCULT      Imaging: I have personally reviewed pertinent films in PACS      EKG, Pathology, and Other Studies: I have personally reviewed pertinent reports  Code Status: Level 1 - Full Code  Advance Directive and Living Will:      Power of :      Please Note: Voice to text dictation software was used in the creation of this document         Demetrius Khalil DPM

## 2021-03-18 NOTE — ASSESSMENT & PLAN NOTE
Wt Readings from Last 3 Encounters:   03/18/21 117 kg (257 lb 8 oz)   02/21/20 117 kg (258 lb)   01/07/20 117 kg (258 lb 12 8 oz)     · Noted to have lower extremity edema  · Patient stopped taking home torsemide about 1 week ago due to trouble with ambulation     · In the ED given torsemide p o  40 mg  · Originally reordered for torsemide 40 mg daily, per Nephrology, received IV albumin x1 in addition to 80 mg IV Lasix x1 on 03/16  · BNP 21,507 on admission  · Last echo 01/08/2020:  EF 55%  · Echo showed ejection fraction of 45-50% with grade 2 diastolic dysfunction  · Received 2 doses of Bumex on 03/17  · Holding diuretics per Nephrology  · Daily weights and I/O  · Low-sodium diet  · Nephrology on board

## 2021-03-18 NOTE — ASSESSMENT & PLAN NOTE
Lab Results   Component Value Date    EGFR 12 03/18/2021    EGFR 13 03/17/2021    EGFR 14 03/16/2021    CREATININE 3 76 (H) 03/18/2021    CREATININE 3 57 (H) 03/17/2021    CREATININE 3 31 (H) 03/16/2021     · Creatinine 3 23 on admission   · Creatinine around 2 06 1 year ago  · Consult Nephrology - appreciate recommendations  · Patient received IV albumin x1 in addition to 80 mg IV Lasix x1 - may have cardiorenal component given noncompliance with torsemide  · Creatinine this morning is 3 76  · Patient received 2 doses of Bumex yesterday on 03/17  · Hold diuretics today per Nephrology  · Ultrasound did not reveal any hydronephrosis  · Avoid hypotension/nephrotoxins medication  · Trend BMP

## 2021-03-18 NOTE — PROGRESS NOTES
[place]  Progress Note - Andrea Strange 1955, 60 y o  female MRN: [id number]  Unit/Bed#: -01 Encounter: [id number]  Primary Care Provider: Mary Nam DO   Date and time admitted to hospital: 3/15/2021  7:09 PM    Increased anion gap metabolic acidosis  Assessment & Plan  · Likely due to worsening renal function  · Appreciate nephrology input - on sodium bicarbonate tablets  · Continue to trend BMP    Foot ulcer, left ([address] )  Assessment & Plan  · Patient with ulcer on left heel of the foot  · Patient is unaware when ulcer started but notes the rash on her lower extremities began about 6 weeks ago  · Patient reports increased pain with touch and walking over the past week  · Patient with poor compliance of diabetes insulin  · Continue IV cefepime/vancomycin  · XR left foot completed on 03/15 with calcaneal spurring, no acute osseous abnormality  · Podiatry consult    Acute kidney injury superimposed on chronic kidney disease [facility])  Assessment & Plan  Lab Results   Component Value Date    EGFR 12 03/18/2021    EGFR 13 03/17/2021    EGFR 14 03/16/2021    CREATININE 3 76 (H) 03/18/2021    CREATININE 3 57 (H) 03/17/2021    CREATININE 3 31 (H) 03/16/2021     · Creatinine 3 23 on admission   · Creatinine around 2 06 1 year ago  · Consult Nephrology - appreciate recommendations  · Patient received IV albumin x1 in addition to 80 mg IV Lasix x1 - may have cardiorenal component given noncompliance with torsemide  · Creatinine this morning is 3 76  · Patient received 2 doses of Bumex yesterday on 03/17  · Hold diuretics today per Nephrology  · Ultrasound did not reveal any hydronephrosis  · Avoid hypotension/nephrotoxins medication  · Trend BMP    Non compliance w medication regimen  Assessment & Plan  · Patient reports she stopped taking her insulin around Thanksgiving 2020, her torsemide about 1 week ago and has not been seeing wound care for her legs,   · Patient had been seeing wound care for wounds of her bilateral lower extremities but stopped going due to the fear of jennifer Covid-19  · consult case management    Cellulitis of both lower extremities  Assessment & Plan  · Patient initially came into the ED with diapers wrapped around both of her legs and reported left heel pain over the last few days  Patient noted to have open wounds on both right and left leg with signs of cellulitis  · About 1 year ago patient reports she had been seeing wound care but stopped due to her wounds improving and fear of jennifer COVID-19  · Leukocytosis resolved  · Follow-up culture results  · ED started patient on Cefepime and originally had been transitioned to Ancef, will broadened to cefepime/vancomycin due to diabetic ulcer with drainage  · Consult podiatry      Anemia  Assessment & Plan  · Hemoglobin 8 3   · No signs of active bleeding  · Hemoglobin this morning is 7 2  · Monitor H&H and transfuse as needed  · Baseline from about 1 year ago about 9  · Iron panel and stool for occult blood    Type 2 diabetes mellitus with hyperglycemia, with long-term current use of insulin [facility])  Assessment & Plan  Lab Results   Component Value Date    HGBA1C 7 6 (H) 03/15/2021       Recent Labs     03/17/21  1038 03/17/21  1524 03/17/21  2050 03/18/21  0753   POCGLU 178* 100 126 89       Blood Sugar Average: Last 72 hrs:  · (P) 124 9Patient with blood sugar of 202 on admission  · Patient stopped taking insulin around Thanksgiving 2020  At that time patient was taking insulin glargine 38 units in the morning and sliding scale insulin with meals     · Continue on insulin glargine 38 units in the morning  · Hemoglobin A1c 7 6  · SSI plus Accu-Chek  · Diabetic diet    Essential hypertension  Assessment & Plan  · Continue home carvedilol 25 mg b i d , hydralazine 75 mg b i d   · Continue to monitor blood pressure per unit protocol    Class 3 severe obesity with body mass index (BMI) of 45 0 to 49 9 in adult [facility])  Assessment & Plan  Body mass index is 44 2 kg/m²  · Encourage lifestyle changes  · Consult nutrition    * Acute on chronic congestive heart failure (HCC)  Assessment & Plan  Wt Readings from Last 3 Encounters:   03/18/21 117 kg (257 lb 8 oz)   02/21/20 117 kg (258 lb)   01/07/20 117 kg (258 lb 12 8 oz)     · Noted to have lower extremity edema  · Patient stopped taking home torsemide about 1 week ago due to trouble with ambulation  · In the ED given torsemide p o  40 mg  · Originally reordered for torsemide 40 mg daily, per Nephrology, received IV albumin x1 in addition to 80 mg IV Lasix x1 on 03/16  · BNP 21,507 on admission  · Last echo 01/08/2020:  EF 55%  · Echo showed ejection fraction of 45-50% with grade 2 diastolic dysfunction  · Received 2 doses of Bumex on 03/17  · Holding diuretics per Nephrology  · Daily weights and I/O  · Low-sodium diet  · Nephrology on board        Labs & Imaging: I have personally reviewed pertinent reports  VTE Pharmacologic Prophylaxis: Heparin  VTE Mechanical Prophylaxis: sequential compression device    Code Status:   Level 1 - Full Code    Patient Centered Rounds: I have performed bedside rounds with nursing staff today  Discussions with Specialists or Other Care Team Provider:  Nephrology    Education and Discussions with Family / Patient:  Daughter at bedside  Current Length of Stay: 3 day(s)    Current Patient Status: Inpatient   Certification Statement: The patient will continue to require additional inpatient hospital stay due to see my assessment and plan  Subjective:   Patient is seen and examined at bedside  Pain is well controlled  Denies any new complaints  Afebrile  All other ROS are negative  Objective:    Vitals: Blood pressure 138/56, pulse 66, temperature 97 9 °F (36 6 °C), resp  rate 19, height 5' 4" (1 626 m), weight 117 kg (257 lb 8 oz), SpO2 96 %, not currently breastfeeding  ,Body mass index is 44 2 kg/m²    SPO2 RA Rest ED to Hosp-Admission (Current) from 3/15/2021 in [address] Med Surg Unit   SpO2  96 %   SpO2 Activity  At Rest   O2 Device  None (Room air)   O2 Flow Rate  --        I&O:     Intake/Output Summary (Last 24 hours) at 3/18/2021 1059  Last data filed at 3/17/2021 2319  Gross per 24 hour   Intake 450 ml   Output 1100 ml   Net -650 ml       Physical Exam:    General- Alert, lying comfortably in bed  Not in any acute distress  Neck- Supple, No JVD  CVS- regular, S1 and S2 normal  Chest- Bilateral Air entry, No rhochi, crackles or wheezing present  Abdomen- soft, obese, nontender, not distended, no guarding or rigidity, BS+  Extremities-  has pedal edema, No calf tenderness  Bilateral lower extremity-in Ace bandage  CNS-   Alert, awake and orientedx3  No focal deficits present  Invasive Devices     Peripheral Intravenous Line            Peripheral IV 03/16/21 Dorsal (posterior); Right Forearm 1 day                      Social History  reviewed  History reviewed  No pertinent family history   reviewed    Meds:  Current Facility-Administered Medications   Medication Dose Route Frequency Provider Last Rate Last Admin    acetaminophen (TYLENOL) tablet 650 mg  650 mg Oral Q6H PRN Christiane Smith PA-C   650 mg at 03/17/21 2320    aspirin (ECOTRIN LOW STRENGTH) EC tablet 81 mg  81 mg Oral Daily Sarah Sanchez PA-C   81 mg at 03/18/21 3822    atorvastatin (LIPITOR) tablet 40 mg  40 mg Oral Daily Sarah Sanchez PA-C   40 mg at 03/18/21 4495    calcium acetate (PHOSLO) capsule 667 mg  667 mg Oral BID With Meals HOWARD Puente   667 mg at 03/18/21 0832    carvedilol (COREG) tablet 25 mg  25 mg Oral BID Christiane Smith PA-C   25 mg at 03/18/21 9497    cefepime (MAXIPIME) IVPB (premix in dextrose) 2,000 mg 50 mL  2,000 mg Intravenous Q24H Linh Peng PA-C 100 mL/hr at 03/17/21 2111 2,000 mg at 03/17/21 2111    clopidogrel (PLAVIX) tablet 75 mg  75 mg Oral Daily Sarah Sanchez PA-C   75 mg at 03/18/21 8833  diphenhydrAMINE (BENADRYL) tablet 25 mg  25 mg Oral Q6H PRN Zaida Lizarraga PA-C   25 mg at 03/17/21 0545    heparin (porcine) subcutaneous injection 5,000 Units  5,000 Units Subcutaneous [facility] Zaida Lizarraga PA-C   5,000 Units at 03/18/21 0544    hydrALAZINE (APRESOLINE) tablet 75 mg  75 mg Oral BID Zaida Lizarraga PA-C   75 mg at 03/18/21 5207    insulin glargine (LANTUS) subcutaneous injection 38 Units 0 38 mL  38 Units Subcutaneous QAM Zaida Lizarraga PA-C   38 Units at 03/18/21 0837    insulin lispro (HumaLOG) 100 units/mL subcutaneous injection 1-6 Units  1-6 Units Subcutaneous TID AC Zaida Lizarraga PA-C   1 Units at 03/17/21 1347    insulin lispro (HumaLOG) 100 units/mL subcutaneous injection 1-6 Units  1-6 Units Subcutaneous HS Zaida Lizarraga PA-C   1 Units at 03/15/21 2329    sodium bicarbonate tablet 1,300 mg  1,300 mg Oral TID after meals HOWARD Puente   1,300 mg at 03/18/21 2159    vancomycin (VANCOCIN) 500 mg in sodium chloride 0 9% 100 mL IVPB  500 mg Intravenous Q24H Dedrick Newman PA-C   500 mg at 03/17/21 1655      Medications Prior to Admission   Medication    aspirin (ECOTRIN LOW STRENGTH) 81 mg EC tablet    atorvastatin (LIPITOR) 40 mg tablet    carvedilol (COREG) 25 mg tablet    clopidogrel (PLAVIX) 75 mg tablet    hydrALAZINE (APRESOLINE) 50 mg tablet    torsemide (DEMADEX) 20 mg tablet    amLODIPine (NORVASC) 2 5 mg tablet    insulin aspart (NovoLOG) 100 units/mL injection    insulin glargine (Toujeo SoloStar) 300 units/mL CONCETRATED U-300 injection pen (1-unit dial)    nitroglycerin (NITROSTAT) 0 4 mg SL tablet       Labs:  Results from last 7 days   Lab Units 03/18/21  0529 03/17/21  0421 03/16/21  1122 03/16/21  0522   WBC Thousand/uL 7 83 7 61  --  12 44*   HEMOGLOBIN g/dL 7 2* 7 1*  --  8 3*   HEMATOCRIT % 24 7* 24 0*  --  28 2*   PLATELETS Thousands/uL 196 187 204 229   NEUTROS PCT % 65 77*  --  88*   LYMPHS PCT % 12* 9*  --  5*   MONOS PCT % 8 8  --  4   EOS PCT % 14* 6  -- 2     Results from last 7 days   Lab Units 03/18/21  0530 03/17/21  0421 03/16/21  1122  03/15/21  1933   POTASSIUM mmol/L 4 4 4 7 5 0  5 1   < > 4 8   CHLORIDE mmol/L 115* 115* 114*   < > 113*   CO2 mmol/L 16* 17* 16*   < > 18*   BUN mg/dL 75* 74* 71*   < > 72*   CREATININE mg/dL 3 76* 3 57* 3 31*   < > 3 23*   CALCIUM mg/dL 7 4* 7 6* 7 5*   < > 7 5*   ALK PHOS U/L 82  --   --   --  99   ALT U/L 14  --   --   --  19   AST U/L 12  --   --   --  12    < > = values in this interval not displayed  Lab Results   Component Value Date    TROPONINI <0 02 03/15/2021    TROPONINI 0 02 01/07/2020    TROPONINI <0 02 05/11/2017    CKTOTAL 53 04/07/2014     Results from last 7 days   Lab Units 03/15/21  1933   INR  1 23*     Lab Results   Component Value Date    BLOODCX No Growth at 48 hrs  03/15/2021    BLOODCX No Growth at 48 hrs  03/15/2021    Bela Relic <10,000 cfu/ml  03/16/2021         Imaging:  Results for orders placed during the hospital encounter of 03/15/21   XR chest 1 view portable    Narrative CHEST     INDICATION:   weakness  COMPARISON:  Chest radiograph from 1/7/2020 and 5/11/2017  EXAM PERFORMED/VIEWS:  XR CHEST PORTABLE  FINDINGS:    Heart size exaggerated by the portable projection and suboptimal inspiration  Lungs clear  No effusion or pneumothorax  Osseous structures normal for age  Impression No acute cardiopulmonary disease  Workstation performed: [id number]       Results for orders placed during the hospital encounter of 01/07/20   XR chest 2 views    Narrative CHEST     INDICATION:   Chest Pain  COMPARISON:  5/11/2017    EXAM PERFORMED/VIEWS:  XR CHEST PA & LATERAL      FINDINGS:    Cardiomediastinal silhouette appears unremarkable  Crowding of the pulmonary markings secondary to shallow inspiration  Grossly clear lungs  No pneumothorax or pleural effusion  Osseous structures appear within normal limits for patient age        Impression No acute cardiopulmonary disease  Workstation performed: [id number]         Last 24 Hours Medication List:   Current Facility-Administered Medications   Medication Dose Route Frequency Provider Last Rate    acetaminophen  650 mg Oral Q6H PRN Sarah Harper PA-C      aspirin  81 mg Oral Daily Sarah Harper PA-C      atorvastatin  40 mg Oral Daily Sarah Harper PA-C      calcium acetate  667 mg Oral BID With Meals HOWARD Puente      carvedilol  25 mg Oral BID Sarah Harper PA-C      cefepime  2,000 mg Intravenous Q24H Gail Hernández PA-C 2,000 mg (03/17/21 2111)    clopidogrel  75 mg Oral Daily Sarah Harper PA-C      diphenhydrAMINE  25 mg Oral Q6H PRN Sarah Harper PA-C      heparin (porcine)  5,000 Units Subcutaneous [facility] Sarah Harper PA-C      hydrALAZINE  75 mg Oral BID Sarah Harper PA-C      insulin glargine  38 Units Subcutaneous QAM Sarah Harper PA-C      insulin lispro  1-6 Units Subcutaneous TID AC Sarah Sanchez PA-C      insulin lispro  1-6 Units Subcutaneous HS Sarah Sanchez PA-C      sodium bicarbonate  1,300 mg Oral TID after meals HOWARD Puente      vancomycin  500 mg Intravenous Q24H Gail Hernández PA-C          Today, Patient Was Seen By: Arnol Herzog MD    ** Please Note: Dictation voice to text software may have been used in the creation of this document   **

## 2021-03-18 NOTE — OCCUPATIONAL THERAPY NOTE
[address] Progress Note     Patient Name: Guanaco Das  JSTHZ'P Date: 3/18/2021  Problem List  Principal Problem:    Acute on chronic congestive heart failure ([facility] )  Active Problems:    Class 3 severe obesity with body mass index (BMI) of 45 0 to 49 9 in adult [facility])    Essential hypertension    Type 2 diabetes mellitus with hyperglycemia, with long-term current use of insulin ([facility])    Anemia    Cellulitis of both lower extremities    Non compliance w medication regimen    Acute kidney injury superimposed on chronic kidney disease ([facility] )    Foot ulcer, left (HCC)    Increased anion gap metabolic acidosis          03/18/21 1353   OT Last Visit   OT Visit Date 03/18/21   Note Type   Note Type Treatment   Restrictions/Precautions   Weight Bearing Precautions Per Order No   Other Precautions Pain;O2   Pain Assessment   Pain Assessment Tool 0-10   Pain Score 5   Pain Location/Orientation Orientation: Bilateral;Location: Leg   Patient's Stated Pain Goal 1   Hospital Pain Intervention(s) Repositioned; Ambulation/increased activity; Emotional support   ADL   LB Dressing Assistance 2  Maximal Assistance   LB Dressing Deficit Don/doff R sock  (Required assist due to bandage)   Toileting Comments Daughter present and states has been taking pt to/from the bathroom all day; (S)  Bed Mobility   Supine to Sit 6  Modified independent   Additional items HOB elevated; Bedrails   Additional items   (Daughter states pt has been MI w/ bed mobility all day)   Additional Comments Remains OOB in recliner w/ all needs    Transfers   Sit to Stand 5  Supervision   Additional items Armrests; Increased time required;Verbal cues  (Cues for hand placement )   Stand to Sit 5  Supervision   Additional items Armrests; Increased time required;Verbal cues  (Cues for hand placement)   Stand pivot 5  Supervision   Additional items   (Using RW )   Functional Mobility   Functional Mobility   (Declined ambulation in room )   Therapeutic Excerise-Strength   UE Strength   (B/L UE isometrics x 10 reps for 3 planes)   Cognition   Overall Cognitive Status WFL   Arousal/Participation Cooperative   Attention Within functional limits   Orientation Level Oriented X4   Memory Within functional limits   Following Commands Follows multistep commands without difficulty   Activity Tolerance   Activity Tolerance Patient tolerated treatment well   Medical Staff Made Aware Kimberly STAUFFER    Assessment   Assessment Pt seen for OT tx session w/ focus on bed mobility, UE exercises, activity tolerance, and functional transfers  Reports 5/10 B/L leg pain  Completed bed mobility w/ MI w/ HOB elevated  RN present in room and took picture of scratch on buttock  Removed oxygen  Performed transfers w/ (S) using RW  Required cues for hand placement  Declined functional mobility  SPO2 87% RA  Tolerated B/L UE isometrics to increase activity x 10 reps for 3 planes w/ breaks  SPO2 94% RA  Daughter reports she has been supervising her mother to/from bathroom and chair all day  Feels her mother is moving at her baseline status  Remains seated in recliner w/ all needs w/ daughter and RN present  Continue OT for functional consistency  Recommendation for returning home w/ family support remains appropriate  Plan   Treatment Interventions ADL retraining;Functional transfer training;UE strengthening/ROM; Endurance training;Patient/family training; Activityengagement   Goal Expiration Date 03/26/21   OT Treatment Day 1   Recommendation   OT Discharge Recommendation Return to previous environment with social support   AM-PAC Daily Activity Inpatient   Lower Body Dressing 2   Bathing 3   Toileting 3   Upper Body Dressing 4   Grooming 4   Eating 4   Daily Activity Raw Score 20   Daily Activity Standardized Score (Calc for Raw Score >=11) 42 03   AM-PAC Applied Cognition Inpatient   Following a Speech/Presentation 4   Understanding Ordinary Conversation 4   Taking Medications 4   Remembering Where Things Are Placed or Put Away 4   Remembering List of 4-5 Errands 4   Taking Care of Complicated Tasks 3   Applied Cognition Raw Score 23   Applied Cognition Standardized Score 53 08     [facility] Karri CORDOVA, OTR/L

## 2021-03-18 NOTE — PROGRESS NOTES
NEPHROLOGY PROGRESS NOTE   Chace Reyes 60 y o  female MRN: [id number]  Unit/Bed#: -01 Encounter: [id number]      ASSESSMENT/PLAN:  Acute kidney injury (POA) on chronic kidney disease, stage IV vs progression of chronic kidney disease, stage IV:  - etiology suspect secondary to cardiorenal syndrome, underlying cellulitis, ATN versus overall progression of underlying chronic kidney disease   - per review, note reviewed from [address] [facility], but appears patient has not followed with nephrology in the past    - upon review of medical records, creatinine 2- 2 1 mg/dL in 2020   - creatinine 3 23 mg/dL upon admission on 3/15   - most recent creatinine 3 76 mg/dL today  - status post Bumex 3 mg IV x 2, will hold further diuretics for now  Will provide D5W 500 mL over 10 hours  - please avoid administering pain medications if able as patient with significantly impaired renal function  - check serologies              - UA: 250 glucose, greater than 300 protein, 10-20 WBC, moderate bacteria, 4-10 hyaline casts, 5-10 fine granular casts  - proteinuria, urine protein creatinine ratio 3 g in 2018, repeat once in steady state                - imaging:  Renal ultrasound revealed right kidney 11 9 x 4 2 x 6 6 cm, left kidney 11 8 x 5 4 x 5 6 cm, normal echogenicity and contour bilaterally, no hydronephrosis bilaterally               - maintain urinary retention protocol   - no indication for emergent renal replacement therapy today  Discussed dialysis with patient, at present, patient is unsure if she would want to pursue dialysis if warranted  - avoid NSAIDs, nephrotoxic agents, IV contrast   - adjust medications to appropriate GFR  - monitor volume status with strict intake/output, daily weight                - weight slightly improved today, 117 kg    - check BMP in a m      Electrolytes, acid/base:  - hyperkalemia improving with most recent potassium 4 4, continue strict low-potassium diet   - suspected elevated anion gap acidosis with most recent bicarbonate 16 and anion gap of 16, continue sodium bicarbonate supplements 2 tab three times daily  - hypernatremia with most recent sodium 147, adjust fluids as above  Bhavesh Her continue to monitor with repeat lab studies       Hyperphosphatemia:  - most recent phosphorus 5 6, continue calcium acetate 667 mg twice daily with meals  - check phosphorus in a m      Hypertension:  - blood pressure with slight fluctuations    - outpatient regimen includes: Carvedilol 25 mg b i d , hydralazine 75 mg b i d , torsemide 40 mg daily, amlodipine 2 5 mg daily  - currently on: Carvedilol 25 mg b i d , hydralazine 75 mg b i d   - optimize hemodynamics; avoid hypotension and fluctuations of blood pressure   - maintain MAP > 65       Anemia of chronic kidney disease:  - most recent hemoglobin 7 2 grams/deciliter   - goal hemoglobin greater than 8 grams/deciliter   - recommend PRBC transfusion for hemoglobin less than 7    - FOBT ordered, will check iron panel as well       Acute on chronic CHF:  - chest x-ray completed upon admission revealed no acute cardiopulmonary disease, lungs clear with no effusion or pneumothorax  -  most recent echo completed on 03/16 revealed EF of 93-43%, grade 2 diastolic dysfunction, mild-to-moderate aortic stenosis, dilated IVC  - continue to monitor weight, intake/output closely      Cellulitis of bilateral lower extremities, on antibiotics per primary team, follow-up blood cultures, no growth at 48 hours  Will also check duplex as patient with continued pain       DM type 2, most recent hemoglobin A1c 7 4, on insulin per primary team      SUBJECTIVE:  Patient is resting in the chair  Patient is without current shortness of breath, wearing 1 L nasal cannula saturating 99%  She feels lethargic today, did not sleep well overnight  She continues with bilateral lower extremity discomfort       OBJECTIVE:  Current Weight: Weight - Scale: 117 kg (257 lb 8 oz)  Vitals:    03/18/21 0826   BP: 138/56   Pulse: 66   Resp:    Temp:    SpO2: 96%       Intake/Output Summary (Last 24 hours) at 3/18/2021 0855  Last data filed at 3/17/2021 2319  Gross per 24 hour   Intake 450 ml   Output 1100 ml   Net -650 ml       General:  No acute distress  Skin:  Warm, dry  Eyes:  Sclerae anicteric  ENT:  Moist lips and mucous membranes  Neck:  Supple trachea midline  Chest:  Diminished breath sounds at bilateral bases   CVS:  Regular rate regular rhythm  Abdomen:  Obese, rounded, nontender  Extremities:  Bilateral lower extremity edema improving, Ace wraps in place   Neuro:  Awake and interactive  Psych:  Appropriate affect      Medications:  Scheduled Meds:  Current Facility-Administered Medications   Medication Dose Route Frequency Provider Last Rate    acetaminophen  650 mg Oral Q6H PRN Christiane Smith PA-C      aspirin  81 mg Oral Daily Christiane Smith PA-C      atorvastatin  40 mg Oral Daily Christiane Smith PA-C      calcium acetate  667 mg Oral BID With Meals HOWARD Puente      carvedilol  25 mg Oral BID Christiane Smith PA-C      cefepime  2,000 mg Intravenous Q24H Lucie Newman PA-C 2,000 mg (03/17/21 2111)    clopidogrel  75 mg Oral Daily Christiane Smith PA-C      diphenhydrAMINE  25 mg Oral Q6H PRN Christiane Smith PA-C      heparin (porcine)  5,000 Units Subcutaneous [facility] Christiane Smith PA-C      hydrALAZINE  75 mg Oral BID Christiane Smith PA-C      insulin glargine  38 Units Subcutaneous QAM Christiane Smith PA-C      insulin lispro  1-6 Units Subcutaneous TID AC Sarah Sanchez PA-C      insulin lispro  1-6 Units Subcutaneous HS Sarah Sanchez PA-C      sodium bicarbonate  1,300 mg Oral TID after meals HOWARD Puente      vancomycin  500 mg Intravenous Q24H Linh Peng PA-C         PRN Meds:   acetaminophen    diphenhydrAMINE    Continuous Infusions:     Laboratory Results:  Results from last 7 days   Lab Units 03/18/21  0530 03/18/21  0529 03/17/21  2629 03/16/21  1122 03/16/21  0522 03/15/21  1933   WBC Thousand/uL  --  7 83 7 61  --  12 44* 11 01*   HEMOGLOBIN g/dL  --  7 2* 7 1*  --  8 3* 8 1*   HEMATOCRIT %  --  24 7* 24 0*  --  28 2* 27 0*   PLATELETS Thousands/uL  --  196 187 204 229 223   SODIUM mmol/L 147*  --  145 143 145 143   POTASSIUM mmol/L 4 4  --  4 7 5 0  5 1 5 5* 4 8   CHLORIDE mmol/L 115*  --  115* 114* 114* 113*   CO2 mmol/L 16*  --  17* 16* 14* 18*   BUN mg/dL 75*  --  74* 71* 69* 72*   CREATININE mg/dL 3 76*  --  3 57* 3 31* 3 33* 3 23*   CALCIUM mg/dL 7 4*  --  7 6* 7 5* 7 5* 7 5*   MAGNESIUM mg/dL 1 9  --  1 8  --   --   --    PHOSPHORUS mg/dL 5 6*  --  6 1*  --   --   --

## 2021-03-18 NOTE — PROGRESS NOTES
Vancomycin IV Pharmacy-to-Dose Consultation    Erick García is a 60 y o  female who is currently receiving Vancomycin IV with management by the Pharmacy Consult service  Assessment/Plan:  The patient was reviewed  Renal function is declining with a Scr of 3 76 today when her baseline is around 2 0 and no signs or symptoms of nephrotoxicity and/or infusion reactions were documented in the chart  Random level from today came back as 14 9 after a 500 mg dose  Will administer a 500 mg now and check random level again in 24 hours and dose with 500 mg if level < 20  We will continue to follow the patients culture results and clinical progress daily      Tariq Gresham, Pharmacist

## 2021-03-18 NOTE — PLAN OF CARE
Problem: OCCUPATIONAL THERAPY ADULT  Goal: Performs self-care activities at highest level of function for planned discharge setting  See evaluation for individualized goals  Description: Treatment Interventions: ADL retraining, Compensatory technique education, Endurance training, Functional transfer training, Equipment evaluation/education, Activityengagement          See flowsheet documentation for full assessment, interventions and recommendations  Outcome: Progressing  Note: Limitation: Decreased ADL status, Decreased self-care trans, Decreased high-level ADLs  Prognosis: Good  Assessment: Pt seen for OT tx session w/ focus on bed mobility, UE exercises, activity tolerance, and functional transfers  Reports 5/10 B/L leg pain  Completed bed mobility w/ MI w/ HOB elevated  RN present in room and took picture of scratch on buttock  Removed oxygen  Performed transfers w/ (S) using RW  Required cues for hand placement  Declined functional mobility  SPO2 87% RA  Tolerated B/L UE isometrics to increase activity x 10 reps for 3 planes w/ breaks  SPO2 94% RA  Daughter reports she has been supervising her mother to/from bathroom and chair all day  Feels her mother is moving at her baseline status  Remains seated in recliner w/ all needs w/ daughter and RN present  Continue OT for functional consistency  Recommendation for returning home w/ family support remains appropriate        OT Discharge Recommendation: Return to previous environment with social support

## 2021-03-19 LAB
ALBUMIN SERPL BCP-MCNC: 2.2 G/DL (ref 3.5–5)
ALP SERPL-CCNC: 78 U/L (ref 46–116)
ALT SERPL W P-5'-P-CCNC: 12 U/L (ref 12–78)
ANION GAP SERPL CALCULATED.3IONS-SCNC: 13 MMOL/L (ref 4–13)
AST SERPL W P-5'-P-CCNC: 18 U/L (ref 5–45)
BASOPHILS # BLD AUTO: 0.04 THOUSANDS/ΜL (ref 0–0.1)
BASOPHILS NFR BLD AUTO: 0 % (ref 0–1)
BILIRUB SERPL-MCNC: 0.4 MG/DL (ref 0.2–1)
BUN SERPL-MCNC: 72 MG/DL (ref 5–25)
CALCIUM ALBUM COR SERPL-MCNC: 8.9 MG/DL (ref 8.3–10.1)
CALCIUM SERPL-MCNC: 7.5 MG/DL (ref 8.3–10.1)
CHLORIDE SERPL-SCNC: 114 MMOL/L (ref 100–108)
CO2 SERPL-SCNC: 18 MMOL/L (ref 21–32)
CREAT SERPL-MCNC: 3.67 MG/DL (ref 0.6–1.3)
EOSINOPHIL # BLD AUTO: 0.91 THOUSAND/ΜL (ref 0–0.61)
EOSINOPHIL NFR BLD AUTO: 8 % (ref 0–6)
ERYTHROCYTE [DISTWIDTH] IN BLOOD BY AUTOMATED COUNT: 15.9 % (ref 11.6–15.1)
FERRITIN SERPL-MCNC: 88 NG/ML (ref 8–388)
GFR SERPL CREATININE-BSD FRML MDRD: 12 ML/MIN/1.73SQ M
GLUCOSE SERPL-MCNC: 109 MG/DL (ref 65–140)
GLUCOSE SERPL-MCNC: 175 MG/DL (ref 65–140)
GLUCOSE SERPL-MCNC: 57 MG/DL (ref 65–140)
GLUCOSE SERPL-MCNC: 73 MG/DL (ref 65–140)
GLUCOSE SERPL-MCNC: 83 MG/DL (ref 65–140)
GLUCOSE SERPL-MCNC: 85 MG/DL (ref 65–140)
HBV CORE AB SER QL: NORMAL
HBV CORE IGM SER QL: NORMAL
HBV SURFACE AG SER QL: NORMAL
HCT VFR BLD AUTO: 25.9 % (ref 34.8–46.1)
HCV AB SER QL: NORMAL
HGB BLD-MCNC: 7.6 G/DL (ref 11.5–15.4)
IMM GRANULOCYTES # BLD AUTO: 0.07 THOUSAND/UL (ref 0–0.2)
IMM GRANULOCYTES NFR BLD AUTO: 1 % (ref 0–2)
IRON SATN MFR SERPL: 14 %
IRON SERPL-MCNC: 25 UG/DL (ref 50–170)
LYMPHOCYTES # BLD AUTO: 0.61 THOUSANDS/ΜL (ref 0.6–4.47)
LYMPHOCYTES NFR BLD AUTO: 5 % (ref 14–44)
MCH RBC QN AUTO: 27.3 PG (ref 26.8–34.3)
MCHC RBC AUTO-ENTMCNC: 29.3 G/DL (ref 31.4–37.4)
MCV RBC AUTO: 93 FL (ref 82–98)
MONOCYTES # BLD AUTO: 0.72 THOUSAND/ΜL (ref 0.17–1.22)
MONOCYTES NFR BLD AUTO: 6 % (ref 4–12)
NEUTROPHILS # BLD AUTO: 9.22 THOUSANDS/ΜL (ref 1.85–7.62)
NEUTS SEG NFR BLD AUTO: 80 % (ref 43–75)
NRBC BLD AUTO-RTO: 0 /100 WBCS
PHOSPHATE SERPL-MCNC: 4.5 MG/DL (ref 2.3–4.1)
PLATELET # BLD AUTO: 219 THOUSANDS/UL (ref 149–390)
PMV BLD AUTO: 10.9 FL (ref 8.9–12.7)
POTASSIUM SERPL-SCNC: 4.5 MMOL/L (ref 3.5–5.3)
PROT SERPL-MCNC: 6.1 G/DL (ref 6.4–8.2)
RBC # BLD AUTO: 2.78 MILLION/UL (ref 3.81–5.12)
RYE IGE QN: NEGATIVE
SODIUM SERPL-SCNC: 145 MMOL/L (ref 136–145)
TIBC SERPL-MCNC: 174 UG/DL (ref 250–450)
VANCOMYCIN SERPL-MCNC: 18 UG/ML
WBC # BLD AUTO: 11.57 THOUSAND/UL (ref 4.31–10.16)

## 2021-03-19 PROCEDURE — 82948 REAGENT STRIP/BLOOD GLUCOSE: CPT

## 2021-03-19 PROCEDURE — 99232 SBSQ HOSP IP/OBS MODERATE 35: CPT | Performed by: INTERNAL MEDICINE

## 2021-03-19 PROCEDURE — 82570 ASSAY OF URINE CREATININE: CPT | Performed by: INTERNAL MEDICINE

## 2021-03-19 PROCEDURE — 87205 SMEAR GRAM STAIN: CPT | Performed by: INTERNAL MEDICINE

## 2021-03-19 PROCEDURE — 80053 COMPREHEN METABOLIC PANEL: CPT | Performed by: NURSE PRACTITIONER

## 2021-03-19 PROCEDURE — 84165 PROTEIN E-PHORESIS SERUM: CPT | Performed by: NURSE PRACTITIONER

## 2021-03-19 PROCEDURE — 84156 ASSAY OF PROTEIN URINE: CPT | Performed by: INTERNAL MEDICINE

## 2021-03-19 PROCEDURE — 84165 PROTEIN E-PHORESIS SERUM: CPT | Performed by: PATHOLOGY

## 2021-03-19 PROCEDURE — 85025 COMPLETE CBC W/AUTO DIFF WBC: CPT | Performed by: INTERNAL MEDICINE

## 2021-03-19 PROCEDURE — 80202 ASSAY OF VANCOMYCIN: CPT | Performed by: PHYSICIAN ASSISTANT

## 2021-03-19 PROCEDURE — 84100 ASSAY OF PHOSPHORUS: CPT | Performed by: NURSE PRACTITIONER

## 2021-03-19 RX ORDER — FERROUS SULFATE 325(65) MG
325 TABLET ORAL
Status: DISCONTINUED | OUTPATIENT
Start: 2021-03-19 | End: 2021-03-23

## 2021-03-19 RX ORDER — SODIUM BICARBONATE 650 MG/1
1300 TABLET ORAL 4 TIMES DAILY
Status: DISCONTINUED | OUTPATIENT
Start: 2021-03-19 | End: 2021-03-26

## 2021-03-19 RX ORDER — ONDANSETRON 2 MG/ML
4 INJECTION INTRAMUSCULAR; INTRAVENOUS EVERY 6 HOURS PRN
Status: DISCONTINUED | OUTPATIENT
Start: 2021-03-19 | End: 2021-04-05 | Stop reason: HOSPADM

## 2021-03-19 RX ORDER — INSULIN GLARGINE 100 [IU]/ML
34 INJECTION, SOLUTION SUBCUTANEOUS EVERY MORNING
Status: DISCONTINUED | OUTPATIENT
Start: 2021-03-19 | End: 2021-03-21

## 2021-03-19 RX ORDER — FERROUS SULFATE 325(65) MG
325 TABLET ORAL
Status: DISCONTINUED | OUTPATIENT
Start: 2021-03-20 | End: 2021-03-19

## 2021-03-19 RX ADMIN — SODIUM BICARBONATE 650 MG TABLET 1300 MG: at 08:08

## 2021-03-19 RX ADMIN — HEPARIN SODIUM 5000 UNITS: 5000 INJECTION INTRAVENOUS; SUBCUTANEOUS at 13:51

## 2021-03-19 RX ADMIN — SODIUM BICARBONATE 650 MG TABLET 1300 MG: at 12:00

## 2021-03-19 RX ADMIN — CEFEPIME HYDROCHLORIDE 2000 MG: 2 INJECTION, SOLUTION INTRAVENOUS at 21:21

## 2021-03-19 RX ADMIN — HEPARIN SODIUM 5000 UNITS: 5000 INJECTION INTRAVENOUS; SUBCUTANEOUS at 22:21

## 2021-03-19 RX ADMIN — INSULIN GLARGINE 30 UNITS: 100 INJECTION, SOLUTION SUBCUTANEOUS at 09:49

## 2021-03-19 RX ADMIN — VANCOMYCIN HYDROCHLORIDE 500 MG: 500 INJECTION, POWDER, LYOPHILIZED, FOR SOLUTION INTRAVENOUS at 20:38

## 2021-03-19 RX ADMIN — SODIUM BICARBONATE 650 MG TABLET 1300 MG: at 18:23

## 2021-03-19 RX ADMIN — HEPARIN SODIUM 5000 UNITS: 5000 INJECTION INTRAVENOUS; SUBCUTANEOUS at 05:19

## 2021-03-19 RX ADMIN — HYDRALAZINE HYDROCHLORIDE 75 MG: 25 TABLET, FILM COATED ORAL at 08:08

## 2021-03-19 RX ADMIN — HYDRALAZINE HYDROCHLORIDE 75 MG: 25 TABLET, FILM COATED ORAL at 20:39

## 2021-03-19 RX ADMIN — CALCIUM ACETATE 667 MG: 667 CAPSULE ORAL at 18:23

## 2021-03-19 RX ADMIN — CLOPIDOGREL BISULFATE 75 MG: 75 TABLET ORAL at 08:08

## 2021-03-19 RX ADMIN — FERROUS SULFATE TAB 325 MG (65 MG ELEMENTAL FE) 325 MG: 325 (65 FE) TAB at 18:23

## 2021-03-19 RX ADMIN — ONDANSETRON 4 MG: 2 INJECTION INTRAMUSCULAR; INTRAVENOUS at 13:50

## 2021-03-19 RX ADMIN — ATORVASTATIN CALCIUM 40 MG: 40 TABLET, FILM COATED ORAL at 08:08

## 2021-03-19 RX ADMIN — SODIUM BICARBONATE 650 MG TABLET 1300 MG: at 22:21

## 2021-03-19 RX ADMIN — INSULIN LISPRO 1 UNITS: 100 INJECTION, SOLUTION INTRAVENOUS; SUBCUTANEOUS at 22:21

## 2021-03-19 RX ADMIN — CARVEDILOL 25 MG: 12.5 TABLET, FILM COATED ORAL at 08:08

## 2021-03-19 RX ADMIN — CARVEDILOL 25 MG: 12.5 TABLET, FILM COATED ORAL at 18:23

## 2021-03-19 RX ADMIN — CALCIUM ACETATE 667 MG: 667 CAPSULE ORAL at 08:08

## 2021-03-19 RX ADMIN — ASPIRIN 81 MG: 81 TABLET, COATED ORAL at 08:08

## 2021-03-19 NOTE — ASSESSMENT & PLAN NOTE
Lab Results   Component Value Date    EGFR 12 03/19/2021    EGFR 12 03/18/2021    EGFR 13 03/17/2021    CREATININE 3 67 (H) 03/19/2021    CREATININE 3 76 (H) 03/18/2021    CREATININE 3 57 (H) 03/17/2021     · Creatinine 3 23 on admission   · Creatinine around 2 06 1 year ago  · Consult Nephrology - appreciate recommendations  · Patient received IV albumin x1 in addition to 80 mg IV Lasix x1 - may have cardiorenal component given noncompliance with torsemide  · Creatinine this morning is 3 67  · Patient received 2 doses of Bumex yesterday on 03/17  · Diuretics per Nephrology  · Ultrasound did not reveal any hydronephrosis  · Avoid hypotension/nephrotoxins medication  · Trend BMP

## 2021-03-19 NOTE — PROGRESS NOTES
NEPHROLOGY PROGRESS NOTE   Loly Rodriguez 60 y o  female MRN: [id number]  Unit/Bed#: -01 Encounter: [id number]      ASSESSMENT/PLAN:  Acute kidney injury (POA) on chronic kidney disease, stage IV vs progression of chronic kidney disease, stage IV:  - etiology suspect secondary to cardiorenal syndrome, underlying cellulitis, ATN versus overall progression of underlying chronic kidney disease   - per review, note reviewed from [facility], but appears patient has not followed with nephrology in the past    - upon review of medical records, creatinine 2- 2 1 mg/dL in 2020   - creatinine 3 23 mg/dL upon admission on 3/15   - most recent creatinine 3 67 mg/dL today               - diuretics and IVF on hold  - please avoid administering pain medications if able as patient with significantly impaired renal function  - serologies: Complements normal, hepatitis panel negative, further serologies pending               - UA: 250 glucose, greater than 300 protein, 10-20 WBC, moderate bacteria, 4-10 hyaline casts, 5-10 fine granular casts  - proteinuria, urine protein creatinine ratio 3 g in 2018, repeat once in steady state                - imaging:  Renal ultrasound revealed right kidney 11 9 x 4 2 x 6 6 cm, left kidney 11 8 x 5 4 x 5 6 cm, normal echogenicity and contour bilaterally, no hydronephrosis bilaterally               - maintain urinary retention protocol   - no indication for emergent renal replacement therapy today  Dr Sofia Dailey obtained consent from patient and placed in chart    - avoid NSAIDs, nephrotoxic agents, IV contrast   - adjust medications to appropriate GFR    - monitor volume status with strict intake/output, daily weight               - weight unchanged 117 kg    - check BMP in a m      Electrolytes, acid/base:  - suspected anion gap acidosis slowly improving with most recent bicarbonate 18 and anion gap of 13, continue sodium bicarbonate supplements, increase to 4 times daily for now and adjust as needed  - check lactic acid, ABG in am    - hypernatremia slightly improving with most recent sodium 145, encouraged free water intake  Cyndishelby Gooden continue to monitor with repeat lab studies       Hyperphosphatemia:  - improving with most recent phosphorus 4 5, continue calcium acetate 667 mg twice daily with meals   - check phosphorus in a m      Hypertension:  - blood pressure with slight fluctuations    - outpatient regimen includes: Carvedilol 25 mg b i d , hydralazine 75 mg b i d , torsemide 40 mg daily, amlodipine 2 5 mg daily  - currently on: Carvedilol 25 mg b i d , hydralazine 75 mg b i d   - optimize hemodynamics; avoid hypotension and fluctuations of blood pressure   - maintain MAP > 65       Anemia of chronic kidney disease:  - most recent hemoglobin 7 6 grams/deciliter   - goal hemoglobin greater than 8 grams/deciliter   - recommend PRBC transfusion for hemoglobin less than 7    - iron panel: Iron saturation 14%, ferritin 88, holding IV Venofer for now in the setting of infection with mild leukocytosis  Will start oral iron supplements      Acute on chronic CHF:  - chest x-ray completed upon admission revealed no acute cardiopulmonary disease, lungs clear with no effusion or pneumothorax  -  most recent echo completed on 03/16 revealed EF of 29-74%, grade 2 diastolic dysfunction, mild-to-moderate aortic stenosis, dilated IVC     - continue to monitor weight, intake/output closely      Cellulitis of bilateral lower extremities, on antibiotics per primary team, follow-up blood cultures, no growth at 72 hours  Lower extremity duplex negative for acute DVT       DM type 2, most recent hemoglobin A1c 7 4, on insulin per primary team      SUBJECTIVE:  Patient is resting in chair  Patient is without acute shortness of breath or chest pain  Patient with some diarrhea overnight, nausea present during diarrhea, none at current    Patient was able to tolerate her breakfast without issue  Encouraged patient to drink more water  OBJECTIVE:  Current Weight: Weight - Scale: 117 kg (258 lb 1 6 oz)  Vitals:    03/19/21 0730   BP: 147/56   Pulse: 65   Resp: 18   Temp: 98 4 °F (36 9 °C)   SpO2: 95%       Intake/Output Summary (Last 24 hours) at 3/19/2021 1007  Last data filed at 3/19/2021 0947  Gross per 24 hour   Intake 1300 ml   Output 300 ml   Net 1000 ml       General:  No acute distress  Skin:  Warm, no rash  Eyes:  Sclerae anicteric  ENT:  Moist lips mucous membranes  Neck:  Supple trachea midline  Chest:  Clear breath sounds bilaterally   CVS:  Regular rate regular rhythm  Abdomen:  Obese, rounded, normoactive bowel sounds  Extremities:  Bilateral lower extremity edema, bilateral lower extremities wrapped, pedal edema noted   Neuro:  Awake and interactive  Psych:  Appropriate affect      Medications:  Scheduled Meds:  Current Facility-Administered Medications   Medication Dose Route Frequency Provider Last Rate    acetaminophen  650 mg Oral Q6H PRN Mir Reilly PA-C      aspirin  81 mg Oral Daily Mir Reilly PA-C      atorvastatin  40 mg Oral Daily Mir Reilly PA-C      calcium acetate  667 mg Oral BID With Meals HOWARD Puente      carvedilol  25 mg Oral BID Mir Reilly PA-C      cefepime  2,000 mg Intravenous Q24H Baron Sheila Newman PA-C 2,000 mg (03/18/21 2103)    clopidogrel  75 mg Oral Daily Mir Reilly PA-C      diphenhydrAMINE  25 mg Oral Q6H PRN Mir Reilly PA-C      heparin (porcine)  5,000 Units Subcutaneous [facility] Mir Reilly PA-C      hydrALAZINE  75 mg Oral BID Mir Reilly PA-C      insulin glargine  34 Units Subcutaneous QAM Britany Mora MD      insulin lispro  1-6 Units Subcutaneous TID AC Sarah Sanchez PA-C      insulin lispro  1-6 Units Subcutaneous HS Sarah Sanchez PA-C      sodium bicarbonate  1,300 mg Oral TID after meals HOWARD Puente      vancomycin  500 mg Intravenous Daily PRN Brianda Barkley PA-C         PRN Meds:   acetaminophen    diphenhydrAMINE    vancomycin    Continuous Infusions:     Laboratory Results:  Results from last 7 days   Lab Units 03/19/21  0513 03/18/21  0530 03/18/21  0529 03/17/21  0421 03/16/21  1122 03/16/21  0522 03/15/21  1933   WBC Thousand/uL 11 57*  --  7 83 7 61  --  12 44* 11 01*   HEMOGLOBIN g/dL 7 6*  --  7 2* 7 1*  --  8 3* 8 1*   HEMATOCRIT % 25 9*  --  24 7* 24 0*  --  28 2* 27 0*   PLATELETS Thousands/uL 219  --  196 187 204 229 223   SODIUM mmol/L 145 147*  --  145 143 145 143   POTASSIUM mmol/L 4 5 4 4  --  4 7 5 0  5 1 5 5* 4 8   CHLORIDE mmol/L 114* 115*  --  115* 114* 114* 113*   CO2 mmol/L 18* 16*  --  17* 16* 14* 18*   BUN mg/dL 72* 75*  --  74* 71* 69* 72*   CREATININE mg/dL 3 67* 3 76*  --  3 57* 3 31* 3 33* 3 23*   CALCIUM mg/dL 7 5* 7 4*  --  7 6* 7 5* 7 5* 7 5*   MAGNESIUM mg/dL  --  1 9  --  1 8  --   --   --    PHOSPHORUS mg/dL 4 5* 5 6*  --  6 1*  --   --   --

## 2021-03-19 NOTE — ASSESSMENT & PLAN NOTE
· Hemoglobin 8 3   · No signs of active bleeding  · Hemoglobin this morning is 7 6  · Monitor H&H and transfuse as needed  · Baseline from about 1 year ago about 9  · Iron panel and stool for occult blood

## 2021-03-19 NOTE — PROGRESS NOTES
Progress Note - Podiatry  Lavon Lanes 60 y o  female MRN: [id number]  Unit/Bed#: -01 Encounter: [id number]    Assessment/Plan:    1  Skin ulceration left heel partial depth with DM2              -will change bandage tomorrow  2  Cellulitis bilateral legs              -secondary to #3                -resolving satisfactorily with current antibiotics  3  Venous hypertension with ulceration and inflammation bilateral legs              -multiple wounds bilateral legs  No strike through drainage noted              -dry sterile dressing bilateral legs with Xeroform, ABDs, Kerlix, and six-inch Ace wraps QOD  4  Noncompliance with medication, anemia, ADI, essential hypertension, morbid obesity              -managed per Internal Medicine and Nephrology      Subjective/Objective   Chief Complaint:   Chief Complaint   Patient presents with    Foot Pain     pt c/o left heel pain  pt has had pain for few days  pt also has ulcers on top of left foot  Subjective:  56-year-old white female resting comfortably in bedside recliner  Relates no significant pain over the past 24 hours from her legs and finds the bandage is quite comfortable  Anxious about bandage change tomorrow  Denies any fever shortness of breath  Blood pressure 144/59, pulse 65, temperature 98 2 °F (36 8 °C), resp  rate 17, height 5' 4" (1 626 m), weight 117 kg (258 lb 1 6 oz), SpO2 95 %, not currently breastfeeding  ,Body mass index is 44 3 kg/m²  Invasive Devices     Peripheral Intravenous Line            Peripheral IV 03/18/21 Dorsal (posterior); Right Forearm less than 1 day                Physical Exam:   General appearance: alert, cooperative and no distress  Neuro/Vascular: Normal strength  Sensation and reflexes:  Diminished epicritic sensation  Pulses: Right: DP 0/4, PT 0/4, Left: DP 0/4, PT 0/4  Capillary Filling:  3 Sec, Edema:  + 2 - +3 edema bilateral  Extremity: Ulcers/Wounds:   · Left heel:  3 0 x 3 0 x 0 1 cm partial depth ulcerative breakdown posterior lateral aspect, minimal serosanguineous drainage, evidence of old DD removed blister, 50% yellow, 50% pink/red, no evidence of acute infection  Pain with palpation  · Bilateral legs:  Multiple partial depth venous hypertension ulcerations with moderate serosanguineous drainage  Erythema appears be diminishing with less calor noted    Edema has also been diminishing over the past 24 hours            Labs and other studies:   CBC w/diff  Results from last 7 days   Lab Units 03/19/21  0513   WBC Thousand/uL 11 57*   HEMOGLOBIN g/dL 7 6*   HEMATOCRIT % 25 9*   PLATELETS Thousands/uL 219   NEUTROS PCT % 80*   LYMPHS PCT % 5*   MONOS PCT % 6   EOS PCT % 8*     BMP  Results from last 7 days   Lab Units 03/19/21  0513   POTASSIUM mmol/L 4 5   CHLORIDE mmol/L 114*   CO2 mmol/L 18*   BUN mg/dL 72*   CREATININE mg/dL 3 67*   CALCIUM mg/dL 7 5*     CMP  Results from last 7 days   Lab Units 03/19/21  0513   POTASSIUM mmol/L 4 5   CHLORIDE mmol/L 114*   CO2 mmol/L 18*   BUN mg/dL 72*   CREATININE mg/dL 3 67*   CALCIUM mg/dL 7 5*   ALK PHOS U/L 78   ALT U/L 12   AST U/L 18       @Culture@  Lab Results   Component Value Date    BLOODCX No Growth at 72 hrs  03/15/2021    BLOODCX No Growth at 72 hrs  03/15/2021    URINECX <10,000 cfu/ml  03/16/2021     No results found for: WOUNDCULT      Current Facility-Administered Medications:     acetaminophen (TYLENOL) tablet 650 mg, 650 mg, Oral, Q6H PRN, Sarah Sanchez PA-C, 650 mg at 03/17/21 2320    aspirin (ECOTRIN LOW STRENGTH) EC tablet 81 mg, 81 mg, Oral, Daily, Sarah Sanchez PA-C, 81 mg at 03/19/21 0808    atorvastatin (LIPITOR) tablet 40 mg, 40 mg, Oral, Daily, Sarah Sanchez PA-C, 40 mg at 03/19/21 0808    calcium acetate (PHOSLO) capsule 667 mg, 667 mg, Oral, BID With Meals, HOWARD Puente, 667 mg at 03/19/21 0808    carvedilol (COREG) tablet 25 mg, 25 mg, Oral, BID, Sarah Sanchez PA-C, 25 mg at 03/19/21 0808    cefepime (MAXIPIME) IVPB (premix in dextrose) 2,000 mg 50 mL, 2,000 mg, Intravenous, Q24H, Zaheer Miller PA-C, Last Rate: 100 mL/hr at 03/18/21 2103, 2,000 mg at 03/18/21 2103    clopidogrel (PLAVIX) tablet 75 mg, 75 mg, Oral, Daily, Sarah Sanchez PA-C, 75 mg at 03/19/21 0808    diphenhydrAMINE (BENADRYL) tablet 25 mg, 25 mg, Oral, Q6H PRN, Denise Todd PA-C, 25 mg at 03/17/21 0545    ferrous sulfate tablet 325 mg, 325 mg, Oral, Daily With Breakfast, Twin Meier MD    heparin (porcine) subcutaneous injection 5,000 Units, 5,000 Units, Subcutaneous, Q8H [facility], 5,000 Units at 03/19/21 1351 **AND** [COMPLETED] Platelet count, , , Once, Denise Todd PA-C    hydrALAZINE (APRESOLINE) tablet 75 mg, 75 mg, Oral, BID, Sarah Sanchez PA-C, 75 mg at 03/19/21 0808    insulin glargine (LANTUS) subcutaneous injection 34 Units 0 34 mL, 34 Units, Subcutaneous, QAM, Danielle Francisco MD, 30 Units at 03/19/21 0949    insulin lispro (HumaLOG) 100 units/mL subcutaneous injection 1-6 Units, 1-6 Units, Subcutaneous, TID AC, Stopped at 03/19/21 1725 **AND** Fingerstick Glucose (POCT), , , TID AC, Sarah Sanchez PA-C    insulin lispro (HumaLOG) 100 units/mL subcutaneous injection 1-6 Units, 1-6 Units, Subcutaneous, HS, Sarah Sanchez PA-C, 1 Units at 03/15/21 2329    ondansetron (ZOFRAN) injection 4 mg, 4 mg, Intravenous, Q6H PRN, Danielle Francisco MD, 4 mg at 03/19/21 1350    sodium bicarbonate tablet 1,300 mg, 1,300 mg, Oral, 4x Daily, HOWARD Puente, 1,300 mg at 03/19/21 1200    vancomycin (VANCOCIN) 500 mg in sodium chloride 0 9% 100 mL IVPB, 500 mg, Intravenous, Daily PRN, Yamini Srinivasan PA-C    Imaging: I have personally reviewed pertinent films in PACS  EKG, Pathology, and Other Studies: I have personally reviewed pertinent reports    VTE Pharmacologic Prophylaxis: Heparin  VTE Mechanical Prophylaxis: reason for no mechanical VTE prophylaxis Bilateral leg wounds    Janet Shown, DPM

## 2021-03-19 NOTE — ASSESSMENT & PLAN NOTE
· Patient initially came into the ED with diapers wrapped around both of her legs and reported left heel pain over the last few days  Patient noted to have open wounds on both right and left leg with signs of cellulitis  · About 1 year ago patient reports she had been seeing wound care but stopped due to her wounds improving and fear of jennifer COVID-19  · Leukocytosis resolved  · ED started patient on Cefepime and originally had been transitioned to Ancef, will broadened to cefepime/vancomycin due to diabetic ulcer with drainage  · Podiatry consult appreciated  · Wound care and dressing changes per Podiatry  · Will DC vancomycin    Continue on cefepime  · Follow-up culture results

## 2021-03-19 NOTE — ASSESSMENT & PLAN NOTE
· Patient with ulcer on left heel of the foot  · Patient is unaware when ulcer started but notes the rash on her lower extremities began about 6 weeks ago  · Patient reports increased pain with touch and walking over the past week  · Patient with poor compliance of diabetes insulin  · Continue IV cefepime  · XR left foot completed on 03/15 with calcaneal spurring, no acute osseous abnormality  · Podiatry consult and recommendations appreciated  · Wound care and dressing changes per Podiatry recommendations

## 2021-03-19 NOTE — ASSESSMENT & PLAN NOTE
Lab Results   Component Value Date    HGBA1C 7 6 (H) 03/15/2021       Recent Labs     03/18/21  1134 03/18/21  1626 03/18/21 2059 03/18/21  2142   POCGLU 115 100 143* 122       Blood Sugar Average: Last 72 hrs:  · (P) 121 [id number] with blood sugar of 202 on admission  · Patient stopped taking insulin around Thanksgiving 2020  At that time patient was taking insulin glargine 38 units in the morning and sliding scale insulin with meals     · Continue on insulin glargine 38 units in the morning  · Hemoglobin A1c 7 6  · SSI plus Accu-Chek  · Diabetic diet

## 2021-03-19 NOTE — ASSESSMENT & PLAN NOTE
Wt Readings from Last 3 Encounters:   03/19/21 117 kg (258 lb 1 6 oz)   02/21/20 117 kg (258 lb)   01/07/20 117 kg (258 lb 12 8 oz)     · Noted to have lower extremity edema  · Patient stopped taking home torsemide about 1 week ago due to trouble with ambulation     · In the ED given torsemide p o  40 mg  · Originally reordered for torsemide 40 mg daily, per Nephrology, received IV albumin x1 in addition to 80 mg IV Lasix x1 on 03/16  · BNP 21,507 on admission  · Last echo 01/08/2020:  EF 55%  · Echo showed ejection fraction of 45-50% with grade 2 diastolic dysfunction  · Received 2 doses of Bumex on 03/17 and held on 03/18  · Diuretics per Nephrology  · Daily weights and I/O  · Low-sodium diet  · Nephrology on board

## 2021-03-19 NOTE — PROGRESS NOTES
[place]  Progress Note - Maurilio Bravo 1955, 60 y o  female MRN: [id number]  Unit/Bed#: -01 Encounter: [id number]  Primary Care Provider: Santo Jeffries DO   Date and time admitted to hospital: 3/15/2021  7:09 PM    Increased anion gap metabolic acidosis  Assessment & Plan  · Likely due to worsening renal function  · Appreciate nephrology input - on sodium bicarbonate tablets  · Continue to trend BMP    Foot ulcer, left ([address] )  Assessment & Plan  · Patient with ulcer on left heel of the foot  · Patient is unaware when ulcer started but notes the rash on her lower extremities began about 6 weeks ago  · Patient reports increased pain with touch and walking over the past week  · Patient with poor compliance of diabetes insulin  · Continue IV cefepime  · XR left foot completed on 03/15 with calcaneal spurring, no acute osseous abnormality  · Podiatry consult and recommendations appreciated  · Wound care and dressing changes per Podiatry recommendations    Acute kidney injury superimposed on chronic kidney disease [facility])  Assessment & Plan  Lab Results   Component Value Date    EGFR 12 03/19/2021    EGFR 12 03/18/2021    EGFR 13 03/17/2021    CREATININE 3 67 (H) 03/19/2021    CREATININE 3 76 (H) 03/18/2021    CREATININE 3 57 (H) 03/17/2021     · Creatinine 3 23 on admission   · Creatinine around 2 06 1 year ago  · Consult Nephrology - appreciate recommendations  · Patient received IV albumin x1 in addition to 80 mg IV Lasix x1 - may have cardiorenal component given noncompliance with torsemide  · Creatinine this morning is 3 67  · Patient received 2 doses of Bumex yesterday on 03/17  · Diuretics per Nephrology  · Ultrasound did not reveal any hydronephrosis  · Avoid hypotension/nephrotoxins medication  · Trend BMP    Non compliance w medication regimen  Assessment & Plan  · Patient reports she stopped taking her insulin around Thanksgiving 2020, her torsemide about 1 week ago and has not been seeing wound care for her legs,   · Patient had been seeing wound care for wounds of her bilateral lower extremities but stopped going due to the fear of jennifer Covid-19  · consult case management    Cellulitis of both lower extremities  Assessment & Plan  · Patient initially came into the ED with diapers wrapped around both of her legs and reported left heel pain over the last few days  Patient noted to have open wounds on both right and left leg with signs of cellulitis  · About 1 year ago patient reports she had been seeing wound care but stopped due to her wounds improving and fear of jennifer COVID-19  · Leukocytosis resolved  · ED started patient on Cefepime and originally had been transitioned to Ancef, will broadened to cefepime/vancomycin due to diabetic ulcer with drainage  · Podiatry consult appreciated  · Wound care and dressing changes per Podiatry  · Will DC vancomycin  Continue on cefepime  · Follow-up culture results      Anemia  Assessment & Plan  · Hemoglobin 8 3   · No signs of active bleeding  · Hemoglobin this morning is 7 6  · Monitor H&H and transfuse as needed  · Baseline from about 1 year ago about 9  · Iron panel and stool for occult blood    Type 2 diabetes mellitus with hyperglycemia, with long-term current use of insulin [facility])  Assessment & Plan  Lab Results   Component Value Date    HGBA1C 7 6 (H) 03/15/2021       Recent Labs     03/18/21  1134 03/18/21  1626 03/18/21  2059 03/18/21  2142   POCGLU 115 100 143* 122       Blood Sugar Average: Last 72 hrs:  · (P) 121 [id number] with blood sugar of 202 on admission  · Patient stopped taking insulin around Thanksgiving 2020  At that time patient was taking insulin glargine 38 units in the morning and sliding scale insulin with meals     · Continue on insulin glargine 38 units in the morning  · Hemoglobin A1c 7 6  · SSI plus Accu-Chek  · Diabetic diet    Essential hypertension  Assessment & Plan  · Continue home carvedilol 25 mg b i d , hydralazine 75 mg b i d   · Continue to monitor blood pressure per unit protocol    Class 3 severe obesity with body mass index (BMI) of 45 0 to 49 9 in adult [facility])  Assessment & Plan  Body mass index is 44 3 kg/m²  · Encourage lifestyle changes  · Consult nutrition    * Acute on chronic congestive heart failure (HCC)  Assessment & Plan  Wt Readings from Last 3 Encounters:   03/19/21 117 kg (258 lb 1 6 oz)   02/21/20 117 kg (258 lb)   01/07/20 117 kg (258 lb 12 8 oz)     · Noted to have lower extremity edema  · Patient stopped taking home torsemide about 1 week ago due to trouble with ambulation  · In the ED given torsemide p o  40 mg  · Originally reordered for torsemide 40 mg daily, per Nephrology, received IV albumin x1 in addition to 80 mg IV Lasix x1 on 03/16  · BNP 21,507 on admission  · Last echo 01/08/2020:  EF 55%  · Echo showed ejection fraction of 45-50% with grade 2 diastolic dysfunction  · Received 2 doses of Bumex on 03/17 and held on 03/18  · Diuretics per Nephrology  · Daily weights and I/O  · Low-sodium diet  · Nephrology on board        Labs & Imaging: I have personally reviewed pertinent reports  VTE Pharmacologic Prophylaxis: Heparin  VTE Mechanical Prophylaxis: sequential compression device    Code Status:   Level 1 - Full Code    Patient Centered Rounds: I have performed bedside rounds with nursing staff today  Discussions with Specialists or Other Care Team Provider:  Nephrology    Education and Discussions with Family / Patient:  Patient states she will update her daughter  Current Length of Stay: 4 day(s)    Current Patient Status: Inpatient   Certification Statement: The patient will continue to require additional inpatient hospital stay due to see my assessment and plan  Subjective:   Patient is seen and examined at bedside  Denies any new complaints  States she feels a little better    Afebrile  All other ROS are negative  Objective:    Vitals: Blood pressure 138/52, pulse 71, temperature 98 1 °F (36 7 °C), resp  rate (!) 25, height 5' 4" (1 626 m), weight 117 kg (258 lb 1 6 oz), SpO2 (!) 89 %, not currently breastfeeding  ,Body mass index is 44 3 kg/m²  SPO2 RA Rest      ED to Hosp-Admission (Current) from 3/15/2021 in [facility]   SpO2  (!) 89 %   SpO2 Activity  At Rest   O2 Device  None (Room air)   O2 Flow Rate  --        I&O:     Intake/Output Summary (Last 24 hours) at 3/19/2021 0706  Last data filed at 3/19/2021 0229  Gross per 24 hour   Intake 1300 ml   Output --   Net 1300 ml       Physical Exam:    General- Alert, sitting comfortably in chair  Not in any acute distress  Neck- Supple, No JVD  CVS- regular, S1 and S2 normal,  Chest- Bilateral Air entry, No rhochi, crackles or wheezing present  Abdomen- soft, obese, nontender, not distended, no guarding or rigidity, BS+  Extremities-  has pedal edema, No calf tenderness  Dressings in place  CNS-   Alert, awake and orientedx3  No focal deficits present  Invasive Devices     Peripheral Intravenous Line            Peripheral IV 03/18/21 Dorsal (posterior); Right Forearm less than 1 day                      Social History  reviewed  History reviewed  No pertinent family history   reviewed    Meds:  Current Facility-Administered Medications   Medication Dose Route Frequency Provider Last Rate Last Admin    acetaminophen (TYLENOL) tablet 650 mg  650 mg Oral Q6H PRN Tabitha Gorman PA-C   650 mg at 03/17/21 2320    aspirin (ECOTRIN LOW STRENGTH) EC tablet 81 mg  81 mg Oral Daily Sarah Sanchez PA-C   81 mg at 03/18/21 0597    atorvastatin (LIPITOR) tablet 40 mg  40 mg Oral Daily Sarah Sanchez PA-C   40 mg at 03/18/21 0833    calcium acetate (PHOSLO) capsule 667 mg  667 mg Oral BID With Meals HOWARD Puente   667 mg at 03/18/21 1913    carvedilol (COREG) tablet 25 mg  25 mg Oral BID Tabitha Gorman PA-C   25 mg at 03/18/21 1909    cefepime (MAXIPIME) IVPB (premix in dextrose) 2,000 mg 50 mL  2,000 mg Intravenous Q24H Sanjana Masterson PA-C 100 mL/hr at 03/18/21 2103 2,000 mg at 03/18/21 2103    clopidogrel (PLAVIX) tablet 75 mg  75 mg Oral Daily Sarah Sanchez PA-C   75 mg at 03/18/21 1285    diphenhydrAMINE (BENADRYL) tablet 25 mg  25 mg Oral Q6H PRN Marely Henderson PA-C   25 mg at 03/17/21 0545    heparin (porcine) subcutaneous injection 5,000 Units  5,000 Units Subcutaneous [facility] Marely Henderson PA-C   5,000 Units at 03/19/21 4737    hydrALAZINE (APRESOLINE) tablet 75 mg  75 mg Oral BID Marely Henderson PA-C   75 mg at 03/18/21 2103    insulin glargine (LANTUS) subcutaneous injection 38 Units 0 38 mL  38 Units Subcutaneous QAM Sarah Sanchez PA-C   38 Units at 03/18/21 0837    insulin lispro (HumaLOG) 100 units/mL subcutaneous injection 1-6 Units  1-6 Units Subcutaneous TID AC Sarah Sanchez PA-C   1 Units at 03/17/21 1347    insulin lispro (HumaLOG) 100 units/mL subcutaneous injection 1-6 Units  1-6 Units Subcutaneous HS Marely Henderson PA-C   1 Units at 03/15/21 2329    sodium bicarbonate tablet 1,300 mg  1,300 mg Oral TID after meals HOWARD Puente   1,300 mg at 03/18/21 1909    vancomycin (VANCOCIN) 500 mg in sodium chloride 0 9% 100 mL IVPB  500 mg Intravenous Daily PRN Sanjana Masterson PA-C          Medications Prior to Admission   Medication    aspirin (ECOTRIN LOW STRENGTH) 81 mg EC tablet    atorvastatin (LIPITOR) 40 mg tablet    carvedilol (COREG) 25 mg tablet    clopidogrel (PLAVIX) 75 mg tablet    hydrALAZINE (APRESOLINE) 50 mg tablet    torsemide (DEMADEX) 20 mg tablet    amLODIPine (NORVASC) 2 5 mg tablet    insulin aspart (NovoLOG) 100 units/mL injection    insulin glargine (Toujeo SoloStar) 300 units/mL CONCETRATED U-300 injection pen (1-unit dial)    nitroglycerin (NITROSTAT) 0 4 mg SL tablet       Labs:  Results from last 7 days   Lab Units 03/19/21  0513 03/18/21  0529 03/17/21  0421   WBC Thousand/uL 11 57* 7 83 7 61 HEMOGLOBIN g/dL 7 6* 7 2* 7 1*   HEMATOCRIT % 25 9* 24 7* 24 0*   PLATELETS Thousands/uL 219 196 187   NEUTROS PCT % 80* 65 77*   LYMPHS PCT % 5* 12* 9*   MONOS PCT % 6 8 8   EOS PCT % 8* 14* 6     Results from last 7 days   Lab Units 03/19/21  0513 03/18/21  0530 03/17/21  0421  03/15/21  1933   POTASSIUM mmol/L 4 5 4 4 4 7   < > 4 8   CHLORIDE mmol/L 114* 115* 115*   < > 113*   CO2 mmol/L 18* 16* 17*   < > 18*   BUN mg/dL 72* 75* 74*   < > 72*   CREATININE mg/dL 3 67* 3 76* 3 57*   < > 3 23*   CALCIUM mg/dL 7 5* 7 4* 7 6*   < > 7 5*   ALK PHOS U/L 78 82  --   --  99   ALT U/L 12 14  --   --  19   AST U/L 18 12  --   --  12    < > = values in this interval not displayed  Lab Results   Component Value Date    TROPONINI <0 02 03/15/2021    TROPONINI 0 02 01/07/2020    TROPONINI <0 02 05/11/2017    CKTOTAL 53 04/07/2014     Results from last 7 days   Lab Units 03/15/21  1933   INR  1 23*     Lab Results   Component Value Date    BLOODCX No Growth at 72 hrs  03/15/2021    BLOODCX No Growth at 72 hrs  03/15/2021    Johnnye Therese <10,000 cfu/ml  03/16/2021         Imaging:  Results for orders placed during the hospital encounter of 03/15/21   XR chest 1 view portable    Narrative CHEST     INDICATION:   weakness  COMPARISON:  Chest radiograph from 1/7/2020 and 5/11/2017  EXAM PERFORMED/VIEWS:  XR CHEST PORTABLE  FINDINGS:    Heart size exaggerated by the portable projection and suboptimal inspiration  Lungs clear  No effusion or pneumothorax  Osseous structures normal for age  Impression No acute cardiopulmonary disease  Workstation performed: [id number]       Results for orders placed during the hospital encounter of 01/07/20   XR chest 2 views    Narrative CHEST     INDICATION:   Chest Pain  COMPARISON:  5/11/2017    EXAM PERFORMED/VIEWS:  XR CHEST PA & LATERAL      FINDINGS:    Cardiomediastinal silhouette appears unremarkable      Crowding of the pulmonary markings secondary to shallow inspiration  Grossly clear lungs  No pneumothorax or pleural effusion  Osseous structures appear within normal limits for patient age  Impression No acute cardiopulmonary disease  Workstation performed: [id number]         Last 24 Hours Medication List:   Current Facility-Administered Medications   Medication Dose Route Frequency Provider Last Rate    acetaminophen  650 mg Oral Q6H PRN Mir Reilly PA-C      aspirin  81 mg Oral Daily Mir eRilly PA-C      atorvastatin  40 mg Oral Daily Mir Reilly PA-C      calcium acetate  667 mg Oral BID With Meals HOWARD Puente      carvedilol  25 mg Oral BID Mir Reilly PA-C      cefepime  2,000 mg Intravenous Q24H Brianda Barkley PA-C 2,000 mg (03/18/21 2103)    clopidogrel  75 mg Oral Daily Mir Reilly PA-C      diphenhydrAMINE  25 mg Oral Q6H PRN Mir Reilly PA-C      heparin (porcine)  5,000 Units Subcutaneous [facility] Mir Reilly PA-C      hydrALAZINE  75 mg Oral BID Mir Reilly PA-C      insulin glargine  38 Units Subcutaneous QAM Mir Reilly PA-C      insulin lispro  1-6 Units Subcutaneous TID AC Sarah Sanchez PA-C      insulin lispro  1-6 Units Subcutaneous HS Sarah Sanchez PA-C      sodium bicarbonate  1,300 mg Oral TID after meals HOWARD Puente      vancomycin  500 mg Intravenous Daily PRN Brianda Barkley PA-C          Today, Patient Was Seen By: Britany Mora MD    ** Please Note: Dictation voice to text software may have been used in the creation of this document   **

## 2021-03-19 NOTE — PLAN OF CARE
Problem: Potential for Falls  Goal: Patient will remain free of falls  Description: INTERVENTIONS:  - Assess patient frequently for physical needs  -  Identify cognitive and physical deficits and behaviors that affect risk of falls    -  [place] fall precautions as indicated by assessment   - Educate patient/family on patient safety including physical limitations  - Instruct patient to call for assistance with activity based on assessment  - Modify environment to reduce risk of injury  - Consider OT/PT consult to assist with strengthening/mobility  Outcome: Progressing     Problem: PAIN - ADULT  Goal: Verbalizes/displays adequate comfort level or baseline comfort level  Description: Interventions:  - Encourage patient to monitor pain and request assistance  - Assess pain using appropriate pain scale  - Administer analgesics based on type and severity of pain and evaluate response  - Implement non-pharmacological measures as appropriate and evaluate response  - Consider cultural and social influences on pain and pain management  - Notify physician/advanced practitioner if interventions unsuccessful or patient reports new pain  Outcome: Progressing     Problem: INFECTION - ADULT  Goal: Absence or prevention of progression during hospitalization  Description: INTERVENTIONS:  - Assess and monitor for signs and symptoms of infection  - Monitor lab/diagnostic results  - Monitor all insertion sites, i e  indwelling lines, tubes, and drains  - Monitor endotracheal if appropriate and nasal secretions for changes in amount and color  - [place] appropriate cooling/warming therapies per order  - Administer medications as ordered  - Instruct and encourage patient and family to use good hand hygiene technique  - Identify and instruct in appropriate isolation precautions for identified infection/condition  Outcome: Progressing  Goal: Absence of fever/infection during neutropenic period  Description: INTERVENTIONS:  - Monitor WBC    Outcome: Progressing     Problem: SAFETY ADULT  Goal: Patient will remain free of falls  Description: INTERVENTIONS:  - Assess patient frequently for physical needs  -  Identify cognitive and physical deficits and behaviors that affect risk of falls    -  [place] fall precautions as indicated by assessment   - Educate patient/family on patient safety including physical limitations  - Instruct patient to call for assistance with activity based on assessment  - Modify environment to reduce risk of injury  - Consider OT/PT consult to assist with strengthening/mobility  Outcome: Progressing  Goal: Maintain or return to baseline ADL function  Description: INTERVENTIONS:  -  Assess patient's ability to carry out ADLs; assess patient's baseline for ADL function and identify physical deficits which impact ability to perform ADLs (bathing, care of mouth/teeth, toileting, grooming, dressing, etc )  - Assess/evaluate cause of self-care deficits   - Assess range of motion  - Assess patient's mobility; develop plan if impaired  - Assess patient's need for assistive devices and provide as appropriate  - Encourage maximum independence but intervene and supervise when necessary  - Involve family in performance of ADLs  - Assess for home care needs following discharge   - Consider OT consult to assist with ADL evaluation and planning for discharge  - Provide patient education as appropriate  Outcome: Progressing  Goal: Maintain or return mobility status to optimal level  Description: INTERVENTIONS:  - Assess patient's baseline mobility status (ambulation, transfers, stairs, etc )    - Identify cognitive and physical deficits and behaviors that affect mobility  - Identify mobility aids required to assist with transfers and/or ambulation (gait belt, sit-to-stand, lift, walker, cane, etc )  - [place] fall precautions as indicated by assessment  - Record patient progress and toleration of activity level on Mobility SBAR; progress patient to next Phase/Stage  - Instruct patient to call for assistance with activity based on assessment  - Consider rehabilitation consult to assist with strengthening/weightbearing, etc   Outcome: Progressing     Problem: DISCHARGE PLANNING  Goal: Discharge to home or other facility with appropriate resources  Description: INTERVENTIONS:  - Identify barriers to discharge w/patient and caregiver  - Arrange for needed discharge resources and transportation as appropriate  - Identify discharge learning needs (meds, wound care, etc )  - Arrange for interpretive services to assist at discharge as needed  - Refer to Case Management Department for coordinating discharge planning if the patient needs post-hospital services based on physician/advanced practitioner order or complex needs related to functional status, cognitive ability, or social support system  Outcome: Progressing     Problem: Knowledge Deficit  Goal: Patient/family/caregiver demonstrates understanding of disease process, treatment plan, medications, and discharge instructions  Description: Complete learning assessment and assess knowledge base    Interventions:  - Provide teaching at level of understanding  - Provide teaching via preferred learning methods  Outcome: Progressing     Problem: CARDIOVASCULAR - ADULT  Goal: Maintains optimal cardiac output and hemodynamic stability  Description: INTERVENTIONS:  - Monitor I/O, vital signs and rhythm  - Monitor for S/S and trends of decreased cardiac output  - Administer and titrate ordered vasoactive medications to optimize hemodynamic stability  - Assess quality of pulses, skin color and temperature  - Assess for signs of decreased coronary artery perfusion  - Instruct patient to report change in severity of symptoms  Outcome: Progressing  Goal: Absence of cardiac dysrhythmias or at baseline rhythm  Description: INTERVENTIONS:  - Continuous cardiac monitoring, vital signs, obtain 12 lead EKG if ordered  - Administer antiarrhythmic and heart rate control medications as ordered  - Monitor electrolytes and administer replacement therapy as ordered  Outcome: Progressing     Problem: SKIN/TISSUE INTEGRITY - ADULT  Goal: Skin integrity remains intact  Description: INTERVENTIONS  - Identify patients at risk for skin breakdown  - Assess and monitor skin integrity  - Assess and monitor nutrition and hydration status  - Monitor labs (i e  albumin)  - Assess for incontinence   - Turn and reposition patient  - Assist with mobility/ambulation  - Relieve pressure over bony prominences  - Avoid friction and shearing  - Provide appropriate hygiene as needed including keeping skin clean and dry  - Evaluate need for skin moisturizer/barrier cream  - Collaborate with interdisciplinary team (i e  Nutrition, Rehabilitation, etc )   - Patient/family teaching  Outcome: Progressing  Goal: Incision(s), wounds(s) or drain site(s) healing without S/S of infection  Description: INTERVENTIONS  - Assess and document risk factors for skin impairment   - Assess and document dressing, incision, wound bed, drain sites and surrounding tissue  - Consider nutrition services referral as needed  - Oral mucous membranes remain intact  - Provide patient/ family education  Outcome: Progressing  Goal: Oral mucous membranes remain intact  Description: INTERVENTIONS  - Assess oral mucosa and hygiene practices  - Implement preventative oral hygiene regimen  - Implement oral medicated treatments as ordered  - Initiate Nutrition services referral as needed  Outcome: Progressing     Problem: Prexisting or High Potential for Compromised Skin Integrity  Goal: Skin integrity is maintained or improved  Description: INTERVENTIONS:  - Identify patients at risk for skin breakdown  - Assess and monitor skin integrity  - Assess and monitor nutrition and hydration status  - Monitor labs   - Assess for incontinence   - Turn and reposition patient  - Assist with mobility/ambulation  - Relieve pressure over bony prominences  - Avoid friction and shearing  - Provide appropriate hygiene as needed including keeping skin clean and dry  - Evaluate need for skin moisturizer/barrier cream  - Collaborate with interdisciplinary team   - Patient/family teaching  - Consider wound care consult   Outcome: Progressing     Problem: Nutrition/Hydration-ADULT  Goal: Nutrient/Hydration intake appropriate for improving, restoring or maintaining nutritional needs  Description: Monitor and assess patient's nutrition/hydration status for malnutrition  Collaborate with interdisciplinary team and initiate plan and interventions as ordered  Monitor patient's weight and dietary intake as ordered or per policy  Utilize nutrition screening tool and intervene as necessary  Determine patient's food preferences and provide high-protein, high-caloric foods as appropriate       INTERVENTIONS:  - Monitor oral intake, urinary output, labs, and treatment plans  - Assess nutrition and hydration status and recommend course of action  - Evaluate amount of meals eaten  - Assist patient with eating if necessary   - Allow adequate time for meals  - Recommend/ encourage appropriate diets, oral nutritional supplements, and vitamin/mineral supplements  - Order, calculate, and assess calorie counts as needed  - Recommend, monitor, and adjust tube feedings and TPN/PPN based on assessed needs  - Assess need for intravenous fluids  - Provide specific nutrition/hydration education as appropriate  - Include patient/family/caregiver in decisions related to nutrition  Outcome: Progressing

## 2021-03-20 LAB
ALBUMIN SERPL BCP-MCNC: 2.2 G/DL (ref 3.5–5)
ALP SERPL-CCNC: 89 U/L (ref 46–116)
ALT SERPL W P-5'-P-CCNC: 13 U/L (ref 12–78)
ANION GAP SERPL CALCULATED.3IONS-SCNC: 15 MMOL/L (ref 4–13)
AST SERPL W P-5'-P-CCNC: 15 U/L (ref 5–45)
BASOPHILS # BLD AUTO: 0.03 THOUSANDS/ΜL (ref 0–0.1)
BASOPHILS NFR BLD AUTO: 0 % (ref 0–1)
BILIRUB SERPL-MCNC: 0.3 MG/DL (ref 0.2–1)
BUN SERPL-MCNC: 71 MG/DL (ref 5–25)
CALCIUM ALBUM COR SERPL-MCNC: 9 MG/DL (ref 8.3–10.1)
CALCIUM SERPL-MCNC: 7.6 MG/DL (ref 8.3–10.1)
CHLORIDE SERPL-SCNC: 113 MMOL/L (ref 100–108)
CO2 SERPL-SCNC: 18 MMOL/L (ref 21–32)
CREAT SERPL-MCNC: 3.93 MG/DL (ref 0.6–1.3)
CREAT UR-MCNC: 143 MG/DL
EOSINOPHIL # BLD AUTO: 1.23 THOUSAND/ΜL (ref 0–0.61)
EOSINOPHIL NFR BLD AUTO: 12 % (ref 0–6)
EOSINOPHIL NFR URNS MANUAL: 0 %
ERYTHROCYTE [DISTWIDTH] IN BLOOD BY AUTOMATED COUNT: 16.5 % (ref 11.6–15.1)
GFR SERPL CREATININE-BSD FRML MDRD: 11 ML/MIN/1.73SQ M
GLUCOSE SERPL-MCNC: 118 MG/DL (ref 65–140)
GLUCOSE SERPL-MCNC: 126 MG/DL (ref 65–140)
GLUCOSE SERPL-MCNC: 140 MG/DL (ref 65–140)
GLUCOSE SERPL-MCNC: 66 MG/DL (ref 65–140)
GLUCOSE SERPL-MCNC: 73 MG/DL (ref 65–140)
GLUCOSE SERPL-MCNC: 99 MG/DL (ref 65–140)
HCT VFR BLD AUTO: 27 % (ref 34.8–46.1)
HGB BLD-MCNC: 7.9 G/DL (ref 11.5–15.4)
IMM GRANULOCYTES # BLD AUTO: 0.02 THOUSAND/UL (ref 0–0.2)
IMM GRANULOCYTES NFR BLD AUTO: 0 % (ref 0–2)
LACTATE SERPL-SCNC: 0.4 MMOL/L (ref 0.5–2)
LYMPHOCYTES # BLD AUTO: 0.74 THOUSANDS/ΜL (ref 0.6–4.47)
LYMPHOCYTES NFR BLD AUTO: 7 % (ref 14–44)
MCH RBC QN AUTO: 27.4 PG (ref 26.8–34.3)
MCHC RBC AUTO-ENTMCNC: 29.3 G/DL (ref 31.4–37.4)
MCV RBC AUTO: 94 FL (ref 82–98)
MONOCYTES # BLD AUTO: 0.66 THOUSAND/ΜL (ref 0.17–1.22)
MONOCYTES NFR BLD AUTO: 7 % (ref 4–12)
NEUTROPHILS # BLD AUTO: 7.53 THOUSANDS/ΜL (ref 1.85–7.62)
NEUTS SEG NFR BLD AUTO: 74 % (ref 43–75)
PHOSPHATE SERPL-MCNC: 5.4 MG/DL (ref 2.3–4.1)
PLATELET # BLD AUTO: 240 THOUSANDS/UL (ref 149–390)
PMV BLD AUTO: 10 FL (ref 8.9–12.7)
POTASSIUM SERPL-SCNC: 4.3 MMOL/L (ref 3.5–5.3)
PROT SERPL-MCNC: 6.3 G/DL (ref 6.4–8.2)
PROT UR-MCNC: 653 MG/DL
PROT/CREAT UR: 4.57 MG/G{CREAT} (ref 0–0.1)
RBC # BLD AUTO: 2.88 MILLION/UL (ref 3.81–5.12)
SODIUM SERPL-SCNC: 146 MMOL/L (ref 136–145)
WBC # BLD AUTO: 10.21 THOUSAND/UL (ref 4.31–10.16)

## 2021-03-20 PROCEDURE — 99232 SBSQ HOSP IP/OBS MODERATE 35: CPT | Performed by: INTERNAL MEDICINE

## 2021-03-20 PROCEDURE — 84100 ASSAY OF PHOSPHORUS: CPT | Performed by: NURSE PRACTITIONER

## 2021-03-20 PROCEDURE — 82948 REAGENT STRIP/BLOOD GLUCOSE: CPT

## 2021-03-20 PROCEDURE — 80053 COMPREHEN METABOLIC PANEL: CPT | Performed by: NURSE PRACTITIONER

## 2021-03-20 PROCEDURE — 83605 ASSAY OF LACTIC ACID: CPT | Performed by: NURSE PRACTITIONER

## 2021-03-20 PROCEDURE — 85025 COMPLETE CBC W/AUTO DIFF WBC: CPT | Performed by: INTERNAL MEDICINE

## 2021-03-20 RX ORDER — ALBUMIN (HUMAN) 12.5 G/50ML
25 SOLUTION INTRAVENOUS 2 TIMES DAILY
Status: COMPLETED | OUTPATIENT
Start: 2021-03-20 | End: 2021-03-21

## 2021-03-20 RX ORDER — CALCIUM ACETATE 667 MG/1
667 CAPSULE ORAL
Status: DISCONTINUED | OUTPATIENT
Start: 2021-03-20 | End: 2021-04-05 | Stop reason: HOSPADM

## 2021-03-20 RX ADMIN — HEPARIN SODIUM 5000 UNITS: 5000 INJECTION INTRAVENOUS; SUBCUTANEOUS at 21:39

## 2021-03-20 RX ADMIN — FERROUS SULFATE TAB 325 MG (65 MG ELEMENTAL FE) 325 MG: 325 (65 FE) TAB at 08:25

## 2021-03-20 RX ADMIN — ALBUMIN (HUMAN) 25 G: 0.25 INJECTION, SOLUTION INTRAVENOUS at 22:08

## 2021-03-20 RX ADMIN — SODIUM BICARBONATE 650 MG TABLET 1300 MG: at 21:38

## 2021-03-20 RX ADMIN — SODIUM BICARBONATE 650 MG TABLET 1300 MG: at 17:12

## 2021-03-20 RX ADMIN — SODIUM BICARBONATE 650 MG TABLET 1300 MG: at 12:08

## 2021-03-20 RX ADMIN — CALCIUM ACETATE 667 MG: 667 CAPSULE ORAL at 12:08

## 2021-03-20 RX ADMIN — CLOPIDOGREL BISULFATE 75 MG: 75 TABLET ORAL at 08:25

## 2021-03-20 RX ADMIN — CARVEDILOL 25 MG: 12.5 TABLET, FILM COATED ORAL at 08:25

## 2021-03-20 RX ADMIN — VANCOMYCIN HYDROCHLORIDE 500 MG: 500 INJECTION, POWDER, LYOPHILIZED, FOR SOLUTION INTRAVENOUS at 20:09

## 2021-03-20 RX ADMIN — HEPARIN SODIUM 5000 UNITS: 5000 INJECTION INTRAVENOUS; SUBCUTANEOUS at 13:37

## 2021-03-20 RX ADMIN — INSULIN GLARGINE 34 UNITS: 100 INJECTION, SOLUTION SUBCUTANEOUS at 08:30

## 2021-03-20 RX ADMIN — ATORVASTATIN CALCIUM 40 MG: 40 TABLET, FILM COATED ORAL at 08:25

## 2021-03-20 RX ADMIN — CEFEPIME HYDROCHLORIDE 2000 MG: 2 INJECTION, SOLUTION INTRAVENOUS at 22:08

## 2021-03-20 RX ADMIN — SODIUM BICARBONATE 650 MG TABLET 1300 MG: at 08:24

## 2021-03-20 RX ADMIN — CALCIUM ACETATE 667 MG: 667 CAPSULE ORAL at 17:12

## 2021-03-20 RX ADMIN — CARVEDILOL 25 MG: 12.5 TABLET, FILM COATED ORAL at 17:12

## 2021-03-20 RX ADMIN — HYDRALAZINE HYDROCHLORIDE 75 MG: 25 TABLET, FILM COATED ORAL at 21:37

## 2021-03-20 RX ADMIN — ASPIRIN 81 MG: 81 TABLET, COATED ORAL at 08:25

## 2021-03-20 RX ADMIN — ALBUMIN (HUMAN) 25 G: 0.25 INJECTION, SOLUTION INTRAVENOUS at 08:25

## 2021-03-20 RX ADMIN — HYDRALAZINE HYDROCHLORIDE 75 MG: 25 TABLET, FILM COATED ORAL at 08:25

## 2021-03-20 RX ADMIN — HEPARIN SODIUM 5000 UNITS: 5000 INJECTION INTRAVENOUS; SUBCUTANEOUS at 05:29

## 2021-03-20 NOTE — PROGRESS NOTES
[place]  Progress Note - Reyna Gonzáles 1955, 60 y o  female MRN: [id number]  Unit/Bed#: -01 Encounter: [id number]  Primary Care Provider: Conrado Baxter DO   Date and time admitted to hospital: 3/15/2021  7:09 PM    Increased anion gap metabolic acidosis  Assessment & Plan  · Likely due to worsening renal function  · Appreciate nephrology input - on sodium bicarbonate tablets  · Continue to trend BMP    Foot ulcer, left (HCC)  Assessment & Plan  · Patient with ulcer on left heel of the foot  · Patient is unaware when ulcer started but notes the rash on her lower extremities began about 6 weeks ago  · Patient reports increased pain with touch and walking over the past week  · Patient with poor compliance of diabetes insulin  · Continue IV cefepime  · XR left foot completed on 03/15 with calcaneal spurring, no acute osseous abnormality  · Podiatry consult and recommendations appreciated  · Wound care and dressing changes per Podiatry recommendations    Acute kidney injury superimposed on chronic kidney disease [facility])  Assessment & Plan  Lab Results   Component Value Date    EGFR 11 03/20/2021    EGFR 12 03/19/2021    EGFR 12 03/18/2021    CREATININE 3 93 (H) 03/20/2021    CREATININE 3 67 (H) 03/19/2021    CREATININE 3 76 (H) 03/18/2021     · Creatinine 3 23 on admission   · Creatinine around 2 06 1 year ago  · Consult Nephrology - appreciate recommendations  · Patient received IV albumin x1 in addition to 80 mg IV Lasix x1 - may have cardiorenal component given noncompliance with torsemide  · Creatinine this morning is 3 93  · Patient received 2 doses of Bumex yesterday on 03/17  · Diuretics on hold    · Patient is receiving albumin as per Nephrology recommendation  · Diuretics per Nephrology  · Ultrasound did not reveal any hydronephrosis  · Avoid hypotension/nephrotoxins medication  · Trend BMP    Non compliance w medication regimen  Assessment & Plan  · Patient reports she stopped taking her insulin around Thanksgiving 2020, her torsemide about 1 week ago and has not been seeing wound care for her legs,   · Patient had been seeing wound care for wounds of her bilateral lower extremities but stopped going due to the fear of jennifer Covid-19  · consult case management    Cellulitis of both lower extremities  Assessment & Plan  · Patient initially came into the ED with diapers wrapped around both of her legs and reported left heel pain over the last few days  Patient noted to have open wounds on both right and left leg with signs of cellulitis  · About 1 year ago patient reports she had been seeing wound care but stopped due to her wounds improving and fear of jennifer COVID-19  · Leukocytosis resolved  · ED started patient on Cefepime and originally had been transitioned to Ancef, will broadened to cefepime/vancomycin due to diabetic ulcer with drainage  · Podiatry consult appreciated  · Wound care and dressing changes per Podiatry  · Continue on cefepime  · Cultures are negative to date  Anemia  Assessment & Plan  · Hemoglobin 8 3   · No signs of active bleeding  · Hemoglobin this morning is 7 9  · Monitor H&H and transfuse as needed  · Baseline from about 1 year ago about 9  · Iron panel noted and stool for occult blood    Type 2 diabetes mellitus with hyperglycemia, with long-term current use of insulin [facility])  Assessment & Plan  Lab Results   Component Value Date    HGBA1C 7 6 (H) 03/15/2021       Recent Labs     03/19/21  1612 03/19/21  2109 03/20/21  0733 03/20/21  0812   POCGLU 85 175* 66 118       Blood Sugar Average: Last 72 hrs:  · (P) 834 [id number] with blood sugar of 202 on admission  · Patient stopped taking insulin around Thanksgiving 2020  At that time patient was taking insulin glargine 38 units in the morning and sliding scale insulin with meals     · Continue on insulin glargine 38 units in the morning  · Hemoglobin A1c 7 6  · SSI plus Accu-Chek  · Diabetic diet    Essential hypertension  Assessment & Plan  · Continue home carvedilol 25 mg b i d , hydralazine 75 mg b i d   · Continue to monitor blood pressure per unit protocol    Class 3 severe obesity with body mass index (BMI) of 45 0 to 49 9 in adult [facility])  Assessment & Plan  Body mass index is 44 65 kg/m²  · Encourage lifestyle changes  · Consult nutrition    * Acute on chronic congestive heart failure (HCC)  Assessment & Plan  Wt Readings from Last 3 Encounters:   03/20/21 118 kg (260 lb 1 6 oz)   02/21/20 117 kg (258 lb)   01/07/20 117 kg (258 lb 12 8 oz)     · Noted to have lower extremity edema  · Patient stopped taking home torsemide about 1 week ago due to trouble with ambulation  · In the ED given torsemide p o  40 mg  · Originally reordered for torsemide 40 mg daily, per Nephrology, received IV albumin x1 in addition to 80 mg IV Lasix x1 on 03/16  · BNP 21,507 on admission  · Last echo 01/08/2020:  EF 55%  · Echo showed ejection fraction of 45-50% with grade 2 diastolic dysfunction  · Received 2 doses of Bumex on 03/17 and held on 03/18  · Diuretics on hold per Nephrology  · Daily weights and I/O  · Low-sodium diet  · Nephrology on board      Labs & Imaging: I have personally reviewed pertinent reports  VTE Pharmacologic Prophylaxis: Heparin  VTE Mechanical Prophylaxis: sequential compression device    Code Status:   Level 1 - Full Code    Patient Centered Rounds: I have performed bedside rounds with nursing staff today  Discussions with Specialists or Other Care Team Provider:  Nephrology    Education and Discussions with Family / Patient:  Daughter    Current Length of Stay: 5 day(s)    Current Patient Status: Inpatient   Certification Statement: The patient will continue to require additional inpatient hospital stay due to see my assessment and plan  Subjective:   Patient is seen and examined at bedside  Lower extremity pain is well controlled    Denies any nausea, vomiting or abdominal pain  No shortness of breath  Afebrile  All other ROS are negative  Objective:    Vitals: Blood pressure 140/62, pulse 60, temperature 98 °F (36 7 °C), resp  rate 18, height 5' 4" (1 626 m), weight 118 kg (260 lb 1 6 oz), SpO2 93 %, not currently breastfeeding  ,Body mass index is 44 65 kg/m²  SPO2 RA Rest      ED to Hosp-Admission (Current) from 3/15/2021 in [address] Med [facility]urg [address]   SpO2  93 %   SpO2 Activity  At Rest   O2 Device  None (Room air)   O2 Flow Rate  --        I&O:     Intake/Output Summary (Last 24 hours) at 3/20/2021 0936  Last data filed at 3/20/2021 0813  Gross per 24 hour   Intake 360 ml   Output 300 ml   Net 60 ml       Physical Exam:    General- Alert, sitting comfortably in chair  Not in any acute distress  Neck- Supple, No JVD  CVS- regular, S1 and S2 normal   Chest- Bilateral Air entry, decreased at bases  Abdomen- soft, nontender, not distended, no guarding or rigidity, BS+  Extremities- bilateral lower extremity dressing in place  CNS-   Alert, awake and orientedx3  No focal deficits present  Invasive Devices     Peripheral Intravenous Line            Peripheral IV 03/18/21 Dorsal (posterior); Right Forearm 1 day                      Social History  reviewed  History reviewed  No pertinent family history   reviewed    Meds:  Current Facility-Administered Medications   Medication Dose Route Frequency Provider Last Rate Last Admin    acetaminophen (TYLENOL) tablet 650 mg  650 mg Oral Q6H PRN Martinez Berry PA-C   650 mg at 03/17/21 2320    albumin human (FLEXBUMIN) 25 % injection 25 g  25 g Intravenous BID HOWARD Roman   25 g at 03/20/21 0825    aspirin (ECOTRIN LOW STRENGTH) EC tablet 81 mg  81 mg Oral Daily Sarah Sanchez PA-C   81 mg at 03/20/21 0825    atorvastatin (LIPITOR) tablet 40 mg  40 mg Oral Daily Sarah Sanchez PA-C   40 mg at 03/20/21 0825    calcium acetate (PHOSLO) capsule 667 mg  667 mg Oral TID With Meals HOWARD Roman        carvedilol (COREG) tablet 25 mg  25 mg Oral BID Jacqui Correa PA-C   25 mg at 03/20/21 0825    cefepime (MAXIPIME) IVPB (premix in dextrose) 2,000 mg 50 mL  2,000 mg Intravenous Q24H Juan Jose Bray PA-C 100 mL/hr at 03/19/21 2121 2,000 mg at 03/19/21 2121    clopidogrel (PLAVIX) tablet 75 mg  75 mg Oral Daily Sarah Sanchez PA-C   75 mg at 03/20/21 0825    diphenhydrAMINE (BENADRYL) tablet 25 mg  25 mg Oral Q6H PRN Jacqui Correa PA-C   25 mg at 03/17/21 0545    ferrous sulfate tablet 325 mg  325 mg Oral Daily With Breakfast Sarah Norton MD   325 mg at 03/20/21 0825    heparin (porcine) subcutaneous injection 5,000 Units  5,000 Units Subcutaneous [facility] Jacqui Correa PA-C   5,000 Units at 03/20/21 0529    hydrALAZINE (APRESOLINE) tablet 75 mg  75 mg Oral BID Jacqui Correa PA-C   75 mg at 03/20/21 0825    insulin glargine (LANTUS) subcutaneous injection 34 Units 0 34 mL  34 Units Subcutaneous QAM Rosa Mcguire MD   34 Units at 03/20/21 0830    insulin lispro (HumaLOG) 100 units/mL subcutaneous injection 1-6 Units  1-6 Units Subcutaneous TID AC Sarah Sanchez PA-C   Stopped at 03/19/21 1725    insulin lispro (HumaLOG) 100 units/mL subcutaneous injection 1-6 Units  1-6 Units Subcutaneous HS Jacqui Correa PA-C   1 Units at 03/19/21 2221    ondansetron (ZOFRAN) injection 4 mg  4 mg Intravenous Q6H PRN Roas Mcguire MD   4 mg at 03/19/21 1350    sodium bicarbonate tablet 1,300 mg  1,300 mg Oral 4x Daily HOWARD Puente   1,300 mg at 03/20/21 0824    vancomycin (VANCOCIN) 500 mg in sodium chloride 0 9% 100 mL IVPB  7 5 mg/kg (Adjusted) Intravenous Q24H Juan Jose Bray PA-C          Medications Prior to Admission   Medication    aspirin (ECOTRIN LOW STRENGTH) 81 mg EC tablet    atorvastatin (LIPITOR) 40 mg tablet    carvedilol (COREG) 25 mg tablet    clopidogrel (PLAVIX) 75 mg tablet    hydrALAZINE (APRESOLINE) 50 mg tablet    torsemide (DEMADEX) 20 mg tablet    amLODIPine (NORVASC) 2 5 mg tablet    insulin glargine (Toujeo SoloStar) 300 units/mL CONCETRATED U-300 injection pen (1-unit dial)    nitroglycerin (NITROSTAT) 0 4 mg SL tablet       Labs:  Results from last 7 days   Lab Units 03/20/21  0537 03/19/21  0513 03/18/21  0529   WBC Thousand/uL 10 21* 11 57* 7 83   HEMOGLOBIN g/dL 7 9* 7 6* 7 2*   HEMATOCRIT % 27 0* 25 9* 24 7*   PLATELETS Thousands/uL 240 219 196   NEUTROS PCT % 74 80* 65   LYMPHS PCT % 7* 5* 12*   MONOS PCT % 7 6 8   EOS PCT % 12* 8* 14*     Results from last 7 days   Lab Units 03/20/21  0537 03/19/21  0513 03/18/21  0530   POTASSIUM mmol/L 4 3 4 5 4 4   CHLORIDE mmol/L 113* 114* 115*   CO2 mmol/L 18* 18* 16*   BUN mg/dL 71* 72* 75*   CREATININE mg/dL 3 93* 3 67* 3 76*   CALCIUM mg/dL 7 6* 7 5* 7 4*   ALK PHOS U/L 89 78 82   ALT U/L 13 12 14   AST U/L 15 18 12     Lab Results   Component Value Date    TROPONINI <0 02 03/15/2021    TROPONINI 0 02 01/07/2020    TROPONINI <0 02 05/11/2017    CKTOTAL 53 04/07/2014     Results from last 7 days   Lab Units 03/15/21  1933   INR  1 23*     Lab Results   Component Value Date    BLOODCX No Growth After 4 Days  03/15/2021    BLOODCX No Growth After 4 Days  03/15/2021    URINECX <10,000 cfu/ml  03/16/2021         Imaging:  Results for orders placed during the hospital encounter of 03/15/21   XR chest 1 view portable    Narrative CHEST     INDICATION:   weakness  COMPARISON:  Chest radiograph from 1/7/2020 and 5/11/2017  EXAM PERFORMED/VIEWS:  XR CHEST PORTABLE  FINDINGS:    Heart size exaggerated by the portable projection and suboptimal inspiration  Lungs clear  No effusion or pneumothorax  Osseous structures normal for age  Impression No acute cardiopulmonary disease  Workstation performed: [id number]       Results for orders placed during the hospital encounter of 01/07/20   XR chest 2 views    Narrative CHEST     INDICATION:   Chest Pain      COMPARISON:  5/11/2017    EXAM PERFORMED/VIEWS:  XR CHEST PA & LATERAL      FINDINGS:    Cardiomediastinal silhouette appears unremarkable  Crowding of the pulmonary markings secondary to shallow inspiration  Grossly clear lungs  No pneumothorax or pleural effusion  Osseous structures appear within normal limits for patient age  Impression No acute cardiopulmonary disease  Workstation performed: [id number]         Last 24 Hours Medication List:   Current Facility-Administered Medications   Medication Dose Route Frequency Provider Last Rate    acetaminophen  650 mg Oral Q6H PRN Lynda Méndez PA-C      albumin human  25 g Intravenous BID HOWARD Weaver      aspirin  81 mg Oral Daily Lynda Méndez PA-C      atorvastatin  40 mg Oral Daily Lynda Méndez PA-C      calcium acetate  667 mg Oral TID With Meals HOWARD Weaver      carvedilol  25 mg Oral BID Lynda Méndez PA-C      cefepime  2,000 mg Intravenous Q24H Tavares Das PA-C 2,000 mg (03/19/21 2121)    clopidogrel  75 mg Oral Daily Lynda Méndez PA-C      diphenhydrAMINE  25 mg Oral Q6H PRN Lynda Méndez PA-C      ferrous sulfate  325 mg Oral Daily With Breakfast Bonnie Ellis MD      heparin (porcine)  5,000 Units Subcutaneous [facility] Lynda Méndez PA-C      hydrALAZINE  75 mg Oral BID Lynda Méndez PA-C      insulin glargine  34 Units Subcutaneous QAM Kayode Tanner MD      insulin lispro  1-6 Units Subcutaneous TID AC Sarah Sanchez PA-C      insulin lispro  1-6 Units Subcutaneous HS Sarah Sanchez PA-C      ondansetron  4 mg Intravenous Q6H PRN Kayode Tanner MD      sodium bicarbonate  1,300 mg Oral 4x Daily HOWARD Puente      vancomycin  7 5 mg/kg (Adjusted) Intravenous Q24H Tavares Das PA-C          Today, Patient Was Seen By: Kayode Tanner MD    ** Please Note: Dictation voice to text software may have been used in the creation of this document   **

## 2021-03-20 NOTE — PROGRESS NOTES
Progress Note - Podiatry  Marko Casey 60 y o  female MRN: [id number]  Unit/Bed#: -01 Encounter: [id number]    Assessment/Plan:    1  Skin ulceration left heel partial depth with DM2              -dry sterile dressing with Dermagran to left heel    -dressing change to be done nursing  2  Cellulitis bilateral legs              -secondary to #3                -diminishing erythema and edema bilateral   3  Venous hypertension with ulceration and inflammation bilateral legs              -multiple wounds bilateral legs  No strike through drainage noted              -dry sterile dressing bilateral legs with Xeroform, ABDs, Kerlix, and six-inch Ace wraps QOD   -dressing changed by nursing  4  Noncompliance with medication, anemia, ADI, essential hypertension, morbid obesity, DM2              -managed per Internal Medicine and Nephrology      Subjective/Objective   Chief Complaint:   Chief Complaint   Patient presents with    Foot Pain     pt c/o left heel pain  pt has had pain for few days  pt also has ulcers on top of left foot  Subjective:  62-year-old white female resting comfortably in bedside recliner  Relates no new or significant pain over the past 24 hours from her legs and left foot  Denies any fever shortness of breath  Blood pressure 140/62, pulse 60, temperature 98 °F (36 7 °C), resp  rate 18, height 5' 4" (1 626 m), weight 118 kg (260 lb 1 6 oz), SpO2 93 %, not currently breastfeeding  ,Body mass index is 44 65 kg/m²  Invasive Devices     Peripheral Intravenous Line            Peripheral IV 03/18/21 Dorsal (posterior); Right Forearm 1 day                Physical Exam:   General appearance: alert, cooperative and no distress  Neuro/Vascular: Normal strength  Sensation and reflexes:  Diminished epicritic sensation  Pulses: Right: DP 0/4, PT 0/4, Left: DP 0/4, PT 0/4  Capillary Filling:  3 Sec, Edema:  + 2 - +3 edema bilateral  Extremity: Ulcers/Wounds:   · Left heel:  3 0 x 3 0 x 0 1 cm partial depth ulcerative breakdown posterior lateral aspect, minimal serosanguineous drainage, evidence of old DD removed blister, 50% yellow, 50% pink/red, no evidence of acute infection  Pain with palpation  · Bilateral legs:  Multiple partial depth venous hypertension ulcerations with moderate serosanguineous drainage  Erythema appears be diminishing with less calor noted  Wounds granulating satisfactorily  Overall legs are improving  Edema has also been diminishing over the past 24 hours            Labs and other studies:   CBC w/diff  Results from last 7 days   Lab Units 03/20/21  0537   WBC Thousand/uL 10 21*   HEMOGLOBIN g/dL 7 9*   HEMATOCRIT % 27 0*   PLATELETS Thousands/uL 240   NEUTROS PCT % 74   LYMPHS PCT % 7*   MONOS PCT % 7   EOS PCT % 12*     BMP  Results from last 7 days   Lab Units 03/20/21  0537   POTASSIUM mmol/L 4 3   CHLORIDE mmol/L 113*   CO2 mmol/L 18*   BUN mg/dL 71*   CREATININE mg/dL 3 93*   CALCIUM mg/dL 7 6*     CMP  Results from last 7 days   Lab Units 03/20/21  0537   POTASSIUM mmol/L 4 3   CHLORIDE mmol/L 113*   CO2 mmol/L 18*   BUN mg/dL 71*   CREATININE mg/dL 3 93*   CALCIUM mg/dL 7 6*   ALK PHOS U/L 89   ALT U/L 13   AST U/L 15       @Culture@  Lab Results   Component Value Date    BLOODCX No Growth After 4 Days  03/15/2021    BLOODCX No Growth After 4 Days   03/15/2021    URINECX <10,000 cfu/ml  03/16/2021     No results found for: WOUNDCULT      Current Facility-Administered Medications:     acetaminophen (TYLENOL) tablet 650 mg, 650 mg, Oral, Q6H PRN, Juana Juares PA-C, 650 mg at 03/17/21 2320    albumin human (FLEXBUMIN) 25 % injection 25 g, 25 g, Intravenous, BID, HOWARD Arevalo, 25 g at 03/20/21 0825    aspirin (ECOTRIN LOW STRENGTH) EC tablet 81 mg, 81 mg, Oral, Daily, Sarah Sanchez PA-C, 81 mg at 03/20/21 0825    atorvastatin (LIPITOR) tablet 40 mg, 40 mg, Oral, Daily, Sarah Sanchez PA-C, 40 mg at 03/20/21 0825    calcium acetate (PHOSLO) capsule 667 mg, 667 mg, Oral, TID With Meals, HOWARD He    carvedilol (COREG) tablet 25 mg, 25 mg, Oral, BID, Sarah Sanchez PA-C, 25 mg at 03/20/21 0825    cefepime (MAXIPIME) IVPB (premix in dextrose) 2,000 mg 50 mL, 2,000 mg, Intravenous, Q24H, Cat Mireles PA-C, Last Rate: 100 mL/hr at 03/19/21 2121, 2,000 mg at 03/19/21 2121    clopidogrel (PLAVIX) tablet 75 mg, 75 mg, Oral, Daily, Sarah Sanchez PA-C, 75 mg at 03/20/21 0825    diphenhydrAMINE (BENADRYL) tablet 25 mg, 25 mg, Oral, Q6H PRN, Heidi Negron PA-C, 25 mg at 03/17/21 0545    ferrous sulfate tablet 325 mg, 325 mg, Oral, Daily With Breakfast, Zohreh Fry MD, 325 mg at 03/20/21 0825    heparin (porcine) subcutaneous injection 5,000 Units, 5,000 Units, Subcutaneous, Q8H [address], 5,000 Units at 03/20/21 0529 **AND** [COMPLETED] Platelet count, , , Once, Heidi Negron PA-C    hydrALAZINE (APRESOLINE) tablet 75 mg, 75 mg, Oral, BID, Sarah Sanchez PA-C, 75 mg at 03/20/21 0825    insulin glargine (LANTUS) subcutaneous injection 34 Units 0 34 mL, 34 Units, Subcutaneous, QAM, Ag Kyle MD, 34 Units at 03/20/21 0830    insulin lispro (HumaLOG) 100 units/mL subcutaneous injection 1-6 Units, 1-6 Units, Subcutaneous, TID AC, Stopped at 03/19/21 1725 **AND** Fingerstick Glucose (POCT), , , TID AC, Sarah Sanchez PA-C    insulin lispro (HumaLOG) 100 units/mL subcutaneous injection 1-6 Units, 1-6 Units, Subcutaneous, HS, Sarah Sanchez PA-C, 1 Units at 03/19/21 2221    ondansetron (ZOFRAN) injection 4 mg, 4 mg, Intravenous, Q6H PRN, Ag Kyle MD, 4 mg at 03/19/21 1350    sodium bicarbonate tablet 1,300 mg, 1,300 mg, Oral, 4x Daily, HOWARD Puente, 1,300 mg at 03/20/21 0824    vancomycin (VANCOCIN) 500 mg in sodium chloride 0 9% 100 mL IVPB, 7 5 mg/kg (Adjusted), Intravenous, Q24H, Cat Mireles PA-C    Imaging: I have personally reviewed pertinent films in PACS  EKG, Pathology, and Other Studies: I have personally reviewed pertinent reports    VTE Pharmacologic Prophylaxis: Heparin  VTE Mechanical Prophylaxis: reason for no mechanical VTE prophylaxis Bilateral leg wounds    Joel Cola, DPM

## 2021-03-20 NOTE — PROGRESS NOTES
Vancomycin Assessment    Maximiliano Carrion is a 60 y o  female who is currently receiving vancomycin 500 mg daily PRN when level < 20 for skin-soft tissue infection     Relevant clinical data and objective history reviewed:  Creatinine   Date Value Ref Range Status   03/19/2021 3 67 (H) 0 60 - 1 30 mg/dL Final     Comment:     Standardized to IDMS reference method   03/18/2021 3 76 (H) 0 60 - 1 30 mg/dL Final     Comment:     Standardized to IDMS reference method   03/17/2021 3 57 (H) 0 60 - 1 30 mg/dL Final     Comment:     Standardized to IDMS reference method   03/10/2015 0 65 0 60 - 1 30 mg/dL Final     Comment:     Standardized to IDMS reference method   04/07/2014 0 60 0 60 - 1 30 mg/dL Final     Comment:     Standardized to IDMS reference method     Vancomycin Rm   Date Value Ref Range Status   03/19/2021 18 0 ug/mL Final     BP (!) 125/48   Pulse 65   Temp 97 8 °F (36 6 °C)   Resp 18   Ht 5' 4" (1 626 m)   Wt 117 kg (258 lb 1 6 oz)   SpO2 95%   BMI 44 30 kg/m²   I/O last 3 completed shifts: In: 6276 [P O :1040; I V :500]  Out: 300 [Urine:200; Stool:100]  Lab Results   Component Value Date/Time    BUN 72 (H) 03/19/2021 05:13 AM    BUN 22 03/10/2015 03:03 PM    WBC 11 57 (H) 03/19/2021 05:13 AM    WBC 9 69 03/10/2015 03:03 PM    HGB 7 6 (L) 03/19/2021 05:13 AM    HGB 15 3 03/10/2015 03:03 PM    HCT 25 9 (L) 03/19/2021 05:13 AM    HCT 44 7 03/10/2015 03:03 PM    MCV 93 03/19/2021 05:13 AM    MCV 85 03/10/2015 03:03 PM     03/19/2021 05:13 AM     03/10/2015 03:03 PM     Temp Readings from Last 3 Encounters:   03/19/21 97 8 °F (36 6 °C)   08/17/20 97 5 °F (36 4 °C)   08/12/20 97 9 °F (36 6 °C)     Vancomycin Days of Therapy: 4    Assessment/Plan  The patient is currently on vancomycin utilizing scheduled dosing    The patient is receiving 500 mg daily PRN when level < 20 with the most recent vancomycin level being at steady-state and therapeutic based on a goal of 15-20 (appropriate for most indications) ; Trough returned as 18 0; Scr has improved and will continue to be monitored closely for any changes  Therefore, after clinical evaluation will be changed to 500mg IV Q24H   Pharmacy will continue to follow closely for s/sx of nephrotoxicity, infusion reactions, and appropriateness of therapy  BMP and CBC will be ordered per protocol  Plan for trough at approximately 2000 on 3/21/21 Pharmacy will continue to follow the patients culture results and clinical progress daily      Alfonzo Bianchi, Pharmacist

## 2021-03-20 NOTE — PROGRESS NOTES
[address] [facility] NOTE   Dorothy Muller 60 y o  female MRN: [id number]  Unit/Bed#: -01 Encounter: [id number]  Reason for Consult:  Acute kidney injury on CKD    ASSESSMENT and PLAN:  Wyatt Lujan is a 61-year-old female with a PMH of diastolic CHF, progressive worsening CKD, presented with worsening leg edema/leg wounds, shortness of breath   Nephrology consulted for management of acute kidney injury on CKD  1  Acute kidney injury (POA):  · Acute kidney injury on worsening underlying CKD  · Creatinine 3 23 on admission slowly increasing currently 3 93  Creatinine bumped up in the last 24 hours after plateauing near 0 4/3 7 for 3 days  Blood pressure slightly lower/relative hypotension?    Blood pressure improved this morning  · Strongly suspect that increase in creatinine related to significant diarrhea yesterday  · Increase in lower extremity edema may be related to dependent position since patient reports that she was on the commode whole day yesterday  · Etiology:    · Cardiorenal, infection/cellulitis causing ATN all in the setting of underlying progression of disease  Also in the differential AIN given peripheral eosinophilia  · Vancomycin level acceptable 18 on 03/19  · Workup:    · Urinalysis concentrated specimen, 3+ protein, no RBCs, granular casts, hyaline casts  · Imaging:  Renal ultrasound unremarkable, no hydronephrosis  · Serologic workup unrevealing which included BHUMI, hepatitis panel, complements  · UPEP still needs to be collected and SPEP pending  · Urine eosinophils pending  · Plan/recommendations:  · Avoid systolic blood pressure less than 130 in light of low diastolic readings  · Will give a short course of albumin due to significant 3rd spacing, volume losses with diarrhea, lower blood pressures  · Requested UPEP to be sent  2  CKD, stage 4:  · Baseline creatinine 2-2 1 since early 2020   Follows with [facility] but would like to follow-up with [facility] at discharge  3  Proteinuria:    · Previous urine protein creatinine ratio 3 g in 2018  · Worsening proteinuria:  Urine protein creatinine ratio 4 57 in the setting of acute kidney injury  4  Acid-base:    · Low bicarbonate likely metabolic acidosis in the setting of severe renal failure  Started on sodium bicarbonate  · Improved slightly with bicarbonate  · Lactic acid normal/low:  0 4  5  Anemia in the setting of CKD:  Iron deficiency with iron saturation 14%; avoiding IV iron given underlying infection  On oral iron supplement  6  Lower extremity cellulitis:  Antibiotics per primary team   No evidence of DVT lower extremities  7  Electrolytes/mineral bone:    · Hyperphosphatemia improving/increasing- increase binder to t i d  Add diet restriction  · Hypernatremia:  Sodium level mildly elevated  Oral intake encouraged  8  Hypertension blood pressure acceptable  Avoid hypotension  9  Chronic combined CHF:  Echocardiogram this admission shows ejection fraction 45-50% with grade 2 diastolic dysfunction, mild-to-moderate AS, no pericardial effusion, dilated IVC    DISPOSITION:  Albumin 25 g b i d  X3 doses  Check labs in the a m  SUBJECTIVE / 24H INTERVAL HISTORY:   "I had a lot of diarrhea yesterday  I was on the commode all day  No shortness of Breath  Appetite has improved, eating better today  Restricts fluid intake      OBJECTIVE:  Current Weight: Weight - Scale: 118 kg (260 lb 1 6 oz)  Vitals:    03/19/21 2039 03/19/21 2112 03/19/21 2114 03/20/21 0641   BP: (!) 118/46 (!) 125/48 (!) 125/48    Pulse:   62    Resp:  18     Temp:  97 8 °F (36 6 °C) 97 8 °F (36 6 °C)    TempSrc:       SpO2:   93%    Weight:    118 kg (260 lb 1 6 oz)   Height:           Intake/Output Summary (Last 24 hours) at 3/20/2021 5473  Last data filed at 3/19/2021 0947  Gross per 24 hour   Intake --   Output 300 ml   Net -300 ml     General: NAD, sitting out of bed in the chair eating breakfast  Skin: no rash  Eyes: anicteric sclera  ENT: moist mucous membrane  Neck: supple  Chest: CTA b/l, no ronchii, no wheeze, no rubs, no rales  Normal effort  CVS: G1A7, systolic murmur, no gallop, no rub    Regular rhythm  Abdomen: soft, nontender, nl sounds, protuberant  Extremities:  +3 edema LE b/l  : no alvares  Neuro: AAOX3  Psych: normal affect  Medications:    Current Facility-Administered Medications:     acetaminophen (TYLENOL) tablet 650 mg, 650 mg, Oral, Q6H PRN, Sarah Sanchez PA-C, 650 mg at 03/17/21 2320    aspirin (ECOTRIN LOW STRENGTH) EC tablet 81 mg, 81 mg, Oral, Daily, Sarah Sanchez PA-C, 81 mg at 03/19/21 0808    atorvastatin (LIPITOR) tablet 40 mg, 40 mg, Oral, Daily, Sarah Sanchez PA-C, 40 mg at 03/19/21 0808    calcium acetate (PHOSLO) capsule 667 mg, 667 mg, Oral, BID With Meals, HOWARD Puente, 667 mg at 03/19/21 1823    carvedilol (COREG) tablet 25 mg, 25 mg, Oral, BID, Sarah Sanchez PA-C, 25 mg at 03/19/21 1823    cefepime (MAXIPIME) IVPB (premix in dextrose) 2,000 mg 50 mL, 2,000 mg, Intravenous, Q24H, Yong Newman PA-C, Last Rate: 100 mL/hr at 03/19/21 2121, 2,000 mg at 03/19/21 2121    clopidogrel (PLAVIX) tablet 75 mg, 75 mg, Oral, Daily, Sarah Sanchez PA-C, 75 mg at 03/19/21 0808    diphenhydrAMINE (BENADRYL) tablet 25 mg, 25 mg, Oral, Q6H PRN, Sarah Sanchez PA-C, 25 mg at 03/17/21 0545    ferrous sulfate tablet 325 mg, 325 mg, Oral, Daily With Breakfast, Verner Hollering, MD, 325 mg at 03/19/21 1823    heparin (porcine) subcutaneous injection 5,000 Units, 5,000 Units, Subcutaneous, Q8H [address], 5,000 Units at 03/20/21 0529 **AND** [COMPLETED] Platelet count, , , Once, Gerry Allen PA-C    hydrALAZINE (APRESOLINE) tablet 75 mg, 75 mg, Oral, BID, Sarah Sanchez PA-C, 75 mg at 03/19/21 2039    insulin glargine (LANTUS) subcutaneous injection 34 Units 0 34 mL, 34 Units, Subcutaneous, Neyda VELASCO MD, 30 Units at 03/19/21 0949    insulin lispro (HumaLOG) 100 units/mL subcutaneous injection 1-6 Units, 1-6 Units, Subcutaneous, TID AC, Stopped at 03/19/21 1725 **AND** Fingerstick Glucose (POCT), , , TID AC, Sarah Sanchez PA-C    insulin lispro (HumaLOG) 100 units/mL subcutaneous injection 1-6 Units, 1-6 Units, Subcutaneous, HS, Sarah Sanchez PA-C, 1 Units at 03/19/21 2221    ondansetron (ZOFRAN) injection 4 mg, 4 mg, Intravenous, Q6H PRN, Jean Marie Don MD, 4 mg at 03/19/21 1350    sodium bicarbonate tablet 1,300 mg, 1,300 mg, Oral, 4x Daily, HOWARD Puente, 1,300 mg at 03/19/21 2221    vancomycin (VANCOCIN) 500 mg in sodium chloride 0 9% 100 mL IVPB, 7 5 mg/kg (Adjusted), Intravenous, Q24H, Jayne Moreno PA-C    Laboratory Results:  Results from last 7 days   Lab Units 03/20/21  0537 03/19/21  0513 03/18/21  0530 03/18/21  0529 03/17/21  0421 03/16/21  1122 03/16/21  0522 03/15/21  1933   WBC Thousand/uL 10 21* 11 57*  --  7 83 7 61  --  12 44* 11 01*   HEMOGLOBIN g/dL 7 9* 7 6*  --  7 2* 7 1*  --  8 3* 8 1*   HEMATOCRIT % 27 0* 25 9*  --  24 7* 24 0*  --  28 2* 27 0*   PLATELETS Thousands/uL 240 219  --  196 187 204 229 223   POTASSIUM mmol/L 4 3 4 5 4 4  --  4 7 5 0  5 1 5 5* 4 8   CHLORIDE mmol/L 113* 114* 115*  --  115* 114* 114* 113*   CO2 mmol/L 18* 18* 16*  --  17* 16* 14* 18*   BUN mg/dL 71* 72* 75*  --  74* 71* 69* 72*   CREATININE mg/dL 3 93* 3 67* 3 76*  --  3 57* 3 31* 3 33* 3 23*   CALCIUM mg/dL 7 6* 7 5* 7 4*  --  7 6* 7 5* 7 5* 7 5*   MAGNESIUM mg/dL  --   --  1 9  --  1 8  --   --   --    PHOSPHORUS mg/dL 5 4* 4 5* 5 6*  --  6 1*  --   --   --

## 2021-03-20 NOTE — PROGRESS NOTES
Vancomycin IV Pharmacy-to-Dose Consultation    Nishi Brar is a 60 y o  female who is currently receiving Vancomycin IV with management by the Pharmacy Consult service  Assessment/Plan:  The patient was reviewed  Renal function is stable and no signs or symptoms of nephrotoxicity and/or infusion reactions were documented in the chart  Based on todays assessment, continue current vancomycin (day # 5) dosing of 500mg q24h, with a plan for trough to be drawn at 2000 on 3/21  We will continue to follow the patients culture results and clinical progress daily      Howard Crowley, Pharmacist

## 2021-03-20 NOTE — ASSESSMENT & PLAN NOTE
Lab Results   Component Value Date    EGFR 11 03/20/2021    EGFR 12 03/19/2021    EGFR 12 03/18/2021    CREATININE 3 93 (H) 03/20/2021    CREATININE 3 67 (H) 03/19/2021    CREATININE 3 76 (H) 03/18/2021     · Creatinine 3 23 on admission   · Creatinine around 2 06 1 year ago  · Consult Nephrology - appreciate recommendations  · Patient received IV albumin x1 in addition to 80 mg IV Lasix x1 - may have cardiorenal component given noncompliance with torsemide  · Creatinine this morning is 3 93  · Patient received 2 doses of Bumex yesterday on 03/17  · Diuretics on hold    · Patient is receiving albumin as per Nephrology recommendation  · Diuretics per Nephrology  · Ultrasound did not reveal any hydronephrosis  · Avoid hypotension/nephrotoxins medication  · Trend BMP

## 2021-03-20 NOTE — ASSESSMENT & PLAN NOTE
Wt Readings from Last 3 Encounters:   03/20/21 118 kg (260 lb 1 6 oz)   02/21/20 117 kg (258 lb)   01/07/20 117 kg (258 lb 12 8 oz)     · Noted to have lower extremity edema  · Patient stopped taking home torsemide about 1 week ago due to trouble with ambulation     · In the ED given torsemide p o  40 mg  · Originally reordered for torsemide 40 mg daily, per Nephrology, received IV albumin x1 in addition to 80 mg IV Lasix x1 on 03/16  · BNP 21,507 on admission  · Last echo 01/08/2020:  EF 55%  · Echo showed ejection fraction of 45-50% with grade 2 diastolic dysfunction  · Received 2 doses of Bumex on 03/17 and held on 03/18  · Diuretics on hold per Nephrology  · Daily weights and I/O  · Low-sodium diet  · Nephrology on board

## 2021-03-20 NOTE — ASSESSMENT & PLAN NOTE
· Patient initially came into the ED with diapers wrapped around both of her legs and reported left heel pain over the last few days  Patient noted to have open wounds on both right and left leg with signs of cellulitis  · About 1 year ago patient reports she had been seeing wound care but stopped due to her wounds improving and fear of jennifer COVID-19  · Leukocytosis resolved  · ED started patient on Cefepime and originally had been transitioned to Ancef, will broadened to cefepime/vancomycin due to diabetic ulcer with drainage  · Podiatry consult appreciated  · Wound care and dressing changes per Podiatry  · Continue on cefepime  · Cultures are negative to date

## 2021-03-20 NOTE — ASSESSMENT & PLAN NOTE
Lab Results   Component Value Date    HGBA1C 7 6 (H) 03/15/2021       Recent Labs     03/19/21  1612 03/19/21  2109 03/20/21  0733 03/20/21  0812   POCGLU 85 175* 66 118       Blood Sugar Average: Last 72 hrs:  · (P) [id number] with blood sugar of 202 on admission  · Patient stopped taking insulin around Thanksgiving 2020  At that time patient was taking insulin glargine 38 units in the morning and sliding scale insulin with meals     · Continue on insulin glargine 38 units in the morning  · Hemoglobin A1c 7 6  · SSI plus Accu-Chek  · Diabetic diet

## 2021-03-20 NOTE — ASSESSMENT & PLAN NOTE
· Hemoglobin 8 3   · No signs of active bleeding  · Hemoglobin this morning is 7 9  · Monitor H&H and transfuse as needed  · Baseline from about 1 year ago about 9  · Iron panel noted and stool for occult blood

## 2021-03-21 PROBLEM — N18.4 TYPE 2 DIABETES MELLITUS WITH STAGE 4 CHRONIC KIDNEY DISEASE, WITH LONG-TERM CURRENT USE OF INSULIN (HCC): Status: ACTIVE | Noted: 2020-01-07

## 2021-03-21 PROBLEM — E11.22 TYPE 2 DIABETES MELLITUS WITH STAGE 4 CHRONIC KIDNEY DISEASE, WITH LONG-TERM CURRENT USE OF INSULIN (HCC): Status: ACTIVE | Noted: 2020-01-07

## 2021-03-21 PROBLEM — I50.43 ACUTE ON CHRONIC COMBINED SYSTOLIC AND DIASTOLIC CONGESTIVE HEART FAILURE (HCC): Status: ACTIVE | Noted: 2020-01-07

## 2021-03-21 LAB
ANION GAP SERPL CALCULATED.3IONS-SCNC: 12 MMOL/L (ref 4–13)
BACTERIA BLD CULT: NORMAL
BACTERIA BLD CULT: NORMAL
BASOPHILS # BLD AUTO: 0.04 THOUSANDS/ΜL (ref 0–0.1)
BASOPHILS NFR BLD AUTO: 0 % (ref 0–1)
BUN SERPL-MCNC: 72 MG/DL (ref 5–25)
CALCIUM SERPL-MCNC: 7.4 MG/DL (ref 8.3–10.1)
CHLORIDE SERPL-SCNC: 113 MMOL/L (ref 100–108)
CO2 SERPL-SCNC: 18 MMOL/L (ref 21–32)
CREAT SERPL-MCNC: 4.37 MG/DL (ref 0.6–1.3)
EOSINOPHIL # BLD AUTO: 1.44 THOUSAND/ΜL (ref 0–0.61)
EOSINOPHIL NFR BLD AUTO: 15 % (ref 0–6)
ERYTHROCYTE [DISTWIDTH] IN BLOOD BY AUTOMATED COUNT: 16.1 % (ref 11.6–15.1)
GFR SERPL CREATININE-BSD FRML MDRD: 10 ML/MIN/1.73SQ M
GLUCOSE SERPL-MCNC: 105 MG/DL (ref 65–140)
GLUCOSE SERPL-MCNC: 163 MG/DL (ref 65–140)
GLUCOSE SERPL-MCNC: 71 MG/DL (ref 65–140)
GLUCOSE SERPL-MCNC: 89 MG/DL (ref 65–140)
GLUCOSE SERPL-MCNC: 92 MG/DL (ref 65–140)
HCT VFR BLD AUTO: 24.4 % (ref 34.8–46.1)
HGB BLD-MCNC: 7 G/DL (ref 11.5–15.4)
IMM GRANULOCYTES # BLD AUTO: 0.04 THOUSAND/UL (ref 0–0.2)
IMM GRANULOCYTES NFR BLD AUTO: 0 % (ref 0–2)
LYMPHOCYTES # BLD AUTO: 0.71 THOUSANDS/ΜL (ref 0.6–4.47)
LYMPHOCYTES NFR BLD AUTO: 7 % (ref 14–44)
MCH RBC QN AUTO: 27 PG (ref 26.8–34.3)
MCHC RBC AUTO-ENTMCNC: 28.7 G/DL (ref 31.4–37.4)
MCV RBC AUTO: 94 FL (ref 82–98)
MONOCYTES # BLD AUTO: 0.78 THOUSAND/ΜL (ref 0.17–1.22)
MONOCYTES NFR BLD AUTO: 8 % (ref 4–12)
NEUTROPHILS # BLD AUTO: 6.56 THOUSANDS/ΜL (ref 1.85–7.62)
NEUTS SEG NFR BLD AUTO: 70 % (ref 43–75)
NRBC BLD AUTO-RTO: 0 /100 WBCS
PLATELET # BLD AUTO: 209 THOUSANDS/UL (ref 149–390)
PMV BLD AUTO: 10.8 FL (ref 8.9–12.7)
POTASSIUM SERPL-SCNC: 4.3 MMOL/L (ref 3.5–5.3)
RBC # BLD AUTO: 2.59 MILLION/UL (ref 3.81–5.12)
SODIUM SERPL-SCNC: 143 MMOL/L (ref 136–145)
VANCOMYCIN TROUGH SERPL-MCNC: 21.6 UG/ML (ref 10–20)
WBC # BLD AUTO: 9.57 THOUSAND/UL (ref 4.31–10.16)

## 2021-03-21 PROCEDURE — 82948 REAGENT STRIP/BLOOD GLUCOSE: CPT

## 2021-03-21 PROCEDURE — 99233 SBSQ HOSP IP/OBS HIGH 50: CPT | Performed by: INTERNAL MEDICINE

## 2021-03-21 PROCEDURE — 80048 BASIC METABOLIC PNL TOTAL CA: CPT | Performed by: NURSE PRACTITIONER

## 2021-03-21 PROCEDURE — 80202 ASSAY OF VANCOMYCIN: CPT | Performed by: PHYSICIAN ASSISTANT

## 2021-03-21 PROCEDURE — 99232 SBSQ HOSP IP/OBS MODERATE 35: CPT | Performed by: INTERNAL MEDICINE

## 2021-03-21 PROCEDURE — 85025 COMPLETE CBC W/AUTO DIFF WBC: CPT | Performed by: INTERNAL MEDICINE

## 2021-03-21 RX ORDER — INSULIN GLARGINE 100 [IU]/ML
30 INJECTION, SOLUTION SUBCUTANEOUS EVERY MORNING
Status: DISCONTINUED | OUTPATIENT
Start: 2021-03-21 | End: 2021-03-23

## 2021-03-21 RX ORDER — OXYCODONE HYDROCHLORIDE 5 MG/1
5 TABLET ORAL EVERY 6 HOURS PRN
Status: DISCONTINUED | OUTPATIENT
Start: 2021-03-21 | End: 2021-04-05 | Stop reason: HOSPADM

## 2021-03-21 RX ADMIN — CALCIUM ACETATE 667 MG: 667 CAPSULE ORAL at 13:19

## 2021-03-21 RX ADMIN — SODIUM BICARBONATE 650 MG TABLET 1300 MG: at 13:19

## 2021-03-21 RX ADMIN — ALBUMIN (HUMAN) 25 G: 0.25 INJECTION, SOLUTION INTRAVENOUS at 08:48

## 2021-03-21 RX ADMIN — ASPIRIN 81 MG: 81 TABLET, COATED ORAL at 08:46

## 2021-03-21 RX ADMIN — HEPARIN SODIUM 5000 UNITS: 5000 INJECTION INTRAVENOUS; SUBCUTANEOUS at 21:23

## 2021-03-21 RX ADMIN — HYDRALAZINE HYDROCHLORIDE 75 MG: 25 TABLET, FILM COATED ORAL at 21:24

## 2021-03-21 RX ADMIN — INSULIN LISPRO 1 UNITS: 100 INJECTION, SOLUTION INTRAVENOUS; SUBCUTANEOUS at 21:24

## 2021-03-21 RX ADMIN — HEPARIN SODIUM 5000 UNITS: 5000 INJECTION INTRAVENOUS; SUBCUTANEOUS at 13:19

## 2021-03-21 RX ADMIN — ATORVASTATIN CALCIUM 40 MG: 40 TABLET, FILM COATED ORAL at 08:47

## 2021-03-21 RX ADMIN — CARVEDILOL 25 MG: 12.5 TABLET, FILM COATED ORAL at 17:13

## 2021-03-21 RX ADMIN — FERROUS SULFATE TAB 325 MG (65 MG ELEMENTAL FE) 325 MG: 325 (65 FE) TAB at 08:47

## 2021-03-21 RX ADMIN — CALCIUM ACETATE 667 MG: 667 CAPSULE ORAL at 17:13

## 2021-03-21 RX ADMIN — SODIUM BICARBONATE 650 MG TABLET 1300 MG: at 08:45

## 2021-03-21 RX ADMIN — INSULIN GLARGINE 30 UNITS: 100 INJECTION, SOLUTION SUBCUTANEOUS at 08:54

## 2021-03-21 RX ADMIN — SODIUM BICARBONATE 650 MG TABLET 1300 MG: at 17:12

## 2021-03-21 RX ADMIN — CALCIUM ACETATE 667 MG: 667 CAPSULE ORAL at 08:47

## 2021-03-21 RX ADMIN — CARVEDILOL 25 MG: 12.5 TABLET, FILM COATED ORAL at 08:46

## 2021-03-21 RX ADMIN — SODIUM BICARBONATE 650 MG TABLET 1300 MG: at 21:23

## 2021-03-21 RX ADMIN — HYDRALAZINE HYDROCHLORIDE 75 MG: 25 TABLET, FILM COATED ORAL at 08:47

## 2021-03-21 RX ADMIN — CLOPIDOGREL BISULFATE 75 MG: 75 TABLET ORAL at 08:47

## 2021-03-21 RX ADMIN — HEPARIN SODIUM 5000 UNITS: 5000 INJECTION INTRAVENOUS; SUBCUTANEOUS at 05:28

## 2021-03-21 NOTE — PROGRESS NOTES
[place]  Progress Note - Justice Clonts 1955, 60 y o  female MRN: [id number]  Unit/Bed#: -01 Encounter: [id number]  Primary Care Provider: Zoe Becker DO   Date and time admitted to hospital: 3/15/2021  7:09 PM    Increased anion gap metabolic acidosis  Assessment & Plan  · Likely due to worsening renal function  · Appreciate nephrology input - on sodium bicarbonate tablets  · Continue to trend BMP    Foot ulcer, left (HCC)  Assessment & Plan  · Patient with ulcer on left heel of the foot  · Patient is unaware when ulcer started but notes the rash on her lower extremities began about 6 weeks ago  · Patient reports increased pain with touch and walking over the past week  · Patient with poor compliance of diabetes insulin  · Patient completed the course of cefepime  · XR left foot completed on 03/15 with calcaneal spurring, no acute osseous abnormality  · Podiatry consult and recommendations appreciated  · Wound care and dressing changes per Podiatry recommendations    Acute kidney injury superimposed on chronic kidney disease [facility])  Assessment & Plan  Lab Results   Component Value Date    EGFR 10 03/21/2021    EGFR 11 03/20/2021    EGFR 12 03/19/2021    CREATININE 4 37 (H) 03/21/2021    CREATININE 3 93 (H) 03/20/2021    CREATININE 3 67 (H) 03/19/2021     · Creatinine 3 23 on admission   · Creatinine around 2 06 1 year ago  · Consult Nephrology - appreciate recommendations  · Patient received IV albumin x1 in addition to 80 mg IV Lasix x1 - may have cardiorenal component given noncompliance with torsemide  · Creatinine this morning is 4 37  · Patient received 2 doses of Bumex yesterday on 03/17  · Diuretics on hold    · Patient is receiving 3rd dose of albumin as per Nephrology recommendation  · Diuretics per Nephrology  · Ultrasound did not reveal any hydronephrosis  · Avoid hypotension/nephrotoxins medication  · Trend BMP    Non compliance w medication regimen  Assessment & Plan  · Patient reports she stopped taking her insulin around Thanksgiving 2020, her torsemide about 1 week ago and has not been seeing wound care for her legs,   · Patient had been seeing wound care for wounds of her bilateral lower extremities but stopped going due to the fear of jennifer Covid-19  · consult case management    Cellulitis of both lower extremities  Assessment & Plan  · Patient initially came into the ED with diapers wrapped around both of her legs and reported left heel pain over the last few days  Patient noted to have open wounds on both right and left leg with signs of cellulitis  · About 1 year ago patient reports she had been seeing wound care but stopped due to her wounds improving and fear of jennifer COVID-19  · Leukocytosis resolved  · ED started patient on Cefepime and originally had been transitioned to Ancef, will broadened to cefepime/vancomycin due to diabetic ulcer with drainage  · Podiatry consult appreciated  · Wound care and dressing changes per Podiatry  · DC antibiotics  · Cultures are negative to date  · Trend WBC and fever curve    Anemia  Assessment & Plan  · Hemoglobin 8 3   · No signs of active or overt bleeding  · Hemoglobin this morning is 7 0  · Monitor H&H and transfuse as needed  · Baseline from about 1 year ago about 9  · Iron panel noted and stool for occult blood    Type 2 diabetes mellitus with hyperglycemia, with long-term current use of insulin [facility])  Assessment & Plan  Lab Results   Component Value Date    HGBA1C 7 6 (H) 03/15/2021       Recent Labs     03/20/21  1059 03/20/21  1554 03/20/21  2137 03/21/21  0722   POCGLU 126 99 140 71       Blood Sugar Average: Last 72 hrs:  · (P) 106 125Patient with blood sugar of 202 on admission  · Patient stopped taking insulin around Thanksgiving 2020  At that time patient was taking insulin glargine 38 units in the morning and sliding scale insulin with meals     · Continue on insulin glargine 30 units in the morning  · Hemoglobin A1c 7 6  · SSI plus Accu-Chek  · Diabetic diet    Essential hypertension  Assessment & Plan  · Continue home carvedilol 25 mg b i d , hydralazine 75 mg b i d   · Continue to monitor blood pressure per unit protocol    Class 3 severe obesity with body mass index (BMI) of 45 0 to 49 9 in adult [facility])  Assessment & Plan  Body mass index is 45 06 kg/m²  · Encourage lifestyle changes  · Consult nutrition    * Acute on chronic congestive heart failure (HCC)  Assessment & Plan  Wt Readings from Last 3 Encounters:   03/21/21 119 kg (262 lb 8 oz)   02/21/20 117 kg (258 lb)   01/07/20 117 kg (258 lb 12 8 oz)     · Noted to have lower extremity edema  · Patient stopped taking home torsemide about 1 week ago due to trouble with ambulation  · In the ED given torsemide p o  40 mg  · Originally reordered for torsemide 40 mg daily, per Nephrology, received IV albumin x1 in addition to 80 mg IV Lasix x1 on 03/16  · BNP 21,507 on admission  · Last echo 01/08/2020:  EF 55%  · Echo showed ejection fraction of 45-50% with grade 2 diastolic dysfunction  · Received 2 doses of Bumex on 03/17 and held on 03/18  · Diuretics on hold per Nephrology  Patient receiving 3rd does on albumin this morning  · Daily weights and I/O  · Low-sodium diet  · Nephrology on board        Labs & Imaging: I have personally reviewed pertinent reports  VTE Pharmacologic Prophylaxis: Heparin  VTE Mechanical Prophylaxis: sequential compression device    Code Status:   Level 1 - Full Code    Patient Centered Rounds: I have performed bedside rounds with nursing staff today  Discussions with Specialists or Other Care Team Provider:  Nephrology    Education and Discussions with Family / Patient:  Patient states she will update her daughter      Current Length of Stay: 6 day(s)    Current Patient Status: Inpatient   Certification Statement: The patient will continue to require additional inpatient hospital stay due to see my assessment and plan  Subjective:   Patient is seen and examined at bedside  Denies any new complaints  Afebrile  All other ROS are negative  Objective:    Vitals: Blood pressure 141/57, pulse 59, temperature (!) 97 4 °F (36 3 °C), resp  rate 18, height 5' 4" (1 626 m), weight 119 kg (262 lb 8 oz), SpO2 94 %, not currently breastfeeding  ,Body mass index is 45 06 kg/m²  SPO2 RA Rest      ED to Hosp-Admission (Current) from 3/15/2021 in [address] Med [address]urg [address]   SpO2  94 %   SpO2 Activity  At Rest   O2 Device  None (Room air)   O2 Flow Rate  --        I&O:     Intake/Output Summary (Last 24 hours) at 3/21/2021 0953  Last data filed at 3/20/2021 2201  Gross per 24 hour   Intake 600 ml   Output 400 ml   Net 200 ml       Physical Exam:    General- Alert, sitting comfortably in chair  Not in any acute distress  Neck- Supple, No JVD  CVS- regular, S1 and S2 normal  Chest- Bilateral Air entry, No rhochi, crackles or wheezing present  Abdomen- soft, nontender, not distended, no guarding or rigidity, BS+  Extremities- bilateral lower extremity dressings in place  CNS-   Alert, awake and orientedx3  No focal deficits present  Invasive Devices     Peripheral Intravenous Line            Peripheral IV 03/20/21 Right Antecubital less than 1 day                      Social History  reviewed  History reviewed  No pertinent family history   reviewed    Meds:  Current Facility-Administered Medications   Medication Dose Route Frequency Provider Last Rate Last Admin    acetaminophen (TYLENOL) tablet 650 mg  650 mg Oral Q6H PRN Victor Manuel Elizabeth PA-C   650 mg at 03/17/21 2320    aspirin (ECOTRIN LOW STRENGTH) EC tablet 81 mg  81 mg Oral Daily Sarah Sanchez PA-C   81 mg at 03/21/21 0846    atorvastatin (LIPITOR) tablet 40 mg  40 mg Oral Daily Sarah Sanchez PA-C   40 mg at 03/21/21 0847    calcium acetate (PHOSLO) capsule 667 mg  667 mg Oral TID With Meals HOWARD Sanchez   667 mg at 03/21/21 9368  carvedilol (COREG) tablet 25 mg  25 mg Oral BID Twin Aguilar PA-C   25 mg at 03/21/21 0846    clopidogrel (PLAVIX) tablet 75 mg  75 mg Oral Daily Sarah Sanchez PA-C   75 mg at 03/21/21 0847    diphenhydrAMINE (BENADRYL) tablet 25 mg  25 mg Oral Q6H PRN Twin Aguilar PA-C   25 mg at 03/17/21 0545    ferrous sulfate tablet 325 mg  325 mg Oral Daily With Breakfast Shaheen Osroio MD   325 mg at 03/21/21 0847    heparin (porcine) subcutaneous injection 5,000 Units  5,000 Units Subcutaneous [facility] Twin Aguilar PA-C   5,000 Units at 03/21/21 0528    hydrALAZINE (APRESOLINE) tablet 75 mg  75 mg Oral BID Twin Aguilar PA-C   75 mg at 03/21/21 0847    insulin glargine (LANTUS) subcutaneous injection 30 Units 0 3 mL  30 Units Subcutaneous QAM Jean Marie Don MD   30 Units at 03/21/21 0854    insulin lispro (HumaLOG) 100 units/mL subcutaneous injection 1-6 Units  1-6 Units Subcutaneous TID AC Sarah Sanchez PA-C   Stopped at 03/19/21 1725    insulin lispro (HumaLOG) 100 units/mL subcutaneous injection 1-6 Units  1-6 Units Subcutaneous HS Twin Aguilar PA-C   1 Units at 03/19/21 2221    ondansetron (ZOFRAN) injection 4 mg  4 mg Intravenous Q6H PRN Jean Marie Don MD   4 mg at 03/19/21 1350    sodium bicarbonate tablet 1,300 mg  1,300 mg Oral 4x Daily HOWARD Puente   1,300 mg at 03/21/21 0845    vancomycin (VANCOCIN) 500 mg in sodium chloride 0 9% 100 mL IVPB  7 5 mg/kg (Adjusted) Intravenous Daily PRN Jayne Moreno PA-C          Medications Prior to Admission   Medication    aspirin (ECOTRIN LOW STRENGTH) 81 mg EC tablet    atorvastatin (LIPITOR) 40 mg tablet    carvedilol (COREG) 25 mg tablet    clopidogrel (PLAVIX) 75 mg tablet    hydrALAZINE (APRESOLINE) 50 mg tablet    torsemide (DEMADEX) 20 mg tablet    amLODIPine (NORVASC) 2 5 mg tablet    insulin glargine (Toujeo SoloStar) 300 units/mL CONCETRATED U-300 injection pen (1-unit dial)    nitroglycerin (NITROSTAT) 0 4 mg SL tablet Labs:  Results from last 7 days   Lab Units 03/21/21  0522 03/20/21  0537 03/19/21  0513   WBC Thousand/uL 9 57 10 21* 11 57*   HEMOGLOBIN g/dL 7 0* 7 9* 7 6*   HEMATOCRIT % 24 4* 27 0* 25 9*   PLATELETS Thousands/uL 209 240 219   NEUTROS PCT % 70 74 80*   LYMPHS PCT % 7* 7* 5*   MONOS PCT % 8 7 6   EOS PCT % 15* 12* 8*     Results from last 7 days   Lab Units 03/21/21  0522 03/20/21  0537 03/19/21  0513 03/18/21  0530   POTASSIUM mmol/L 4 3 4 3 4 5 4 4   CHLORIDE mmol/L 113* 113* 114* 115*   CO2 mmol/L 18* 18* 18* 16*   BUN mg/dL 72* 71* 72* 75*   CREATININE mg/dL 4 37* 3 93* 3 67* 3 76*   CALCIUM mg/dL 7 4* 7 6* 7 5* 7 4*   ALK PHOS U/L  --  89 78 82   ALT U/L  --  13 12 14   AST U/L  --  15 18 12     Lab Results   Component Value Date    TROPONINI <0 02 03/15/2021    TROPONINI 0 02 01/07/2020    TROPONINI <0 02 05/11/2017    CKTOTAL 53 04/07/2014     Results from last 7 days   Lab Units 03/15/21  1933   INR  1 23*     Lab Results   Component Value Date    BLOODCX No Growth After 5 Days  03/15/2021    BLOODCX No Growth After 5 Days  03/15/2021    URINECX <10,000 cfu/ml  03/16/2021         Imaging:  Results for orders placed during the hospital encounter of 03/15/21   XR chest 1 view portable    Narrative CHEST     INDICATION:   weakness  COMPARISON:  Chest radiograph from 1/7/2020 and 5/11/2017  EXAM PERFORMED/VIEWS:  XR CHEST PORTABLE  FINDINGS:    Heart size exaggerated by the portable projection and suboptimal inspiration  Lungs clear  No effusion or pneumothorax  Osseous structures normal for age  Impression No acute cardiopulmonary disease  Workstation performed: [id number]       Results for orders placed during the hospital encounter of 01/07/20   XR chest 2 views    Narrative CHEST     INDICATION:   Chest Pain  COMPARISON:  5/11/2017    EXAM PERFORMED/VIEWS:  XR CHEST PA & LATERAL      FINDINGS:    Cardiomediastinal silhouette appears unremarkable      Crowding of the pulmonary markings secondary to shallow inspiration  Grossly clear lungs  No pneumothorax or pleural effusion  Osseous structures appear within normal limits for patient age  Impression No acute cardiopulmonary disease  Workstation performed: [id number]         Last 24 Hours Medication List:   Current Facility-Administered Medications   Medication Dose Route Frequency Provider Last Rate    acetaminophen  650 mg Oral Q6H PRN Tabitha Gorman PA-C      aspirin  81 mg Oral Daily Tabitha Gorman PA-C      atorvastatin  40 mg Oral Daily Tabitha Gorman PA-C      calcium acetate  667 mg Oral TID With Meals HOWARD Perez      carvedilol  25 mg Oral BID Tabitha Gorman PA-C      clopidogrel  75 mg Oral Daily Tabitha Gorman PA-C      diphenhydrAMINE  25 mg Oral Q6H PRN Tabitha Gorman PA-C      ferrous sulfate  325 mg Oral Daily With Breakfast Wild Raymundo MD      heparin (porcine)  5,000 Units Subcutaneous [facility] Tabitha Gorman PA-C      hydrALAZINE  75 mg Oral BID Tabitha Gorman PA-C      insulin glargine  30 Units Subcutaneous QAM Hunter Garcia MD      insulin lispro  1-6 Units Subcutaneous TID AC Sarah Sanchez PA-C      insulin lispro  1-6 Units Subcutaneous HS Sarah Sanchez PA-C      ondansetron  4 mg Intravenous Q6H PRN Hunter Garcia MD      sodium bicarbonate  1,300 mg Oral 4x Daily HOWARD Puente      vancomycin  7 5 mg/kg (Adjusted) Intravenous Daily PRN Radha Batista PA-C          Today, Patient Was Seen By: Hunter Garcia MD    ** Please Note: Dictation voice to text software may have been used in the creation of this document   **

## 2021-03-21 NOTE — ASSESSMENT & PLAN NOTE
Lab Results   Component Value Date    HGBA1C 7 6 (H) 03/15/2021       Recent Labs     03/20/21  1059 03/20/21  1554 03/20/21  2137 03/21/21  0722   POCGLU 126 99 140 71       Blood Sugar Average: Last 72 hrs:  · (P) 106 125Patient with blood sugar of 202 on admission  · Patient stopped taking insulin around Thanksgiving 2020  At that time patient was taking insulin glargine 38 units in the morning and sliding scale insulin with meals     · Continue on insulin glargine 30 units in the morning  · Hemoglobin A1c 7 6  · SSI plus Accu-Chek  · Diabetic diet

## 2021-03-21 NOTE — PLAN OF CARE
Problem: Potential for Falls  Goal: Patient will remain free of falls  Description: INTERVENTIONS:  - Assess patient frequently for physical needs  -  Identify cognitive and physical deficits and behaviors that affect risk of falls    -  Asher fall precautions as indicated by assessment   - Educate patient/family on patient safety including physical limitations  - Instruct patient to call for assistance with activity based on assessment  - Modify environment to reduce risk of injury  - Consider OT/PT consult to assist with strengthening/mobility  Outcome: Progressing

## 2021-03-21 NOTE — ASSESSMENT & PLAN NOTE
· Patient with ulcer on left heel of the foot  · Patient is unaware when ulcer started but notes the rash on her lower extremities began about 6 weeks ago  · Patient reports increased pain with touch and walking over the past week  · Patient with poor compliance of diabetes insulin  · Patient completed the course of cefepime  · XR left foot completed on 03/15 with calcaneal spurring, no acute osseous abnormality  · Podiatry consult and recommendations appreciated  · Wound care and dressing changes per Podiatry recommendations

## 2021-03-21 NOTE — PROGRESS NOTES
Vancomycin IV Pharmacy-to-Dose Consultation    Maximiliano Carrion is a 60 y o  female who is currently receiving Vancomycin IV with management by the Pharmacy Consult service  Assessment/Plan:  The patient was reviewed  Renal function is worsening (Scr=4 37 from yesterday's 3 93) and no signs or symptoms of nephrotoxicity and/or infusion reactions were documented in the chart  Based on todays assessment, change current vancomycin (day # 6) dosing back to pulse dosing (500mg daily PRN level<20), with a plan for trough to be drawn at 2000 tonight  We will continue to follow the patients culture results and clinical progress daily      Omaira Cook, Pharmacist

## 2021-03-21 NOTE — ASSESSMENT & PLAN NOTE
Wt Readings from Last 3 Encounters:   03/21/21 119 kg (262 lb 8 oz)   02/21/20 117 kg (258 lb)   01/07/20 117 kg (258 lb 12 8 oz)     · Noted to have lower extremity edema  · Patient stopped taking home torsemide about 1 week ago due to trouble with ambulation  · In the ED given torsemide p o  40 mg  · Originally reordered for torsemide 40 mg daily, per Nephrology, received IV albumin x1 in addition to 80 mg IV Lasix x1 on 03/16  · BNP 21,507 on admission  · Last echo 01/08/2020:  EF 55%  · Echo showed ejection fraction of 45-50% with grade 2 diastolic dysfunction  · Received 2 doses of Bumex on 03/17 and held on 03/18  · Diuretics on hold per Nephrology  Patient receiving 3rd does on albumin this morning    · Daily weights and I/O  · Low-sodium diet  · Nephrology on board
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
## 2021-03-21 NOTE — ASSESSMENT & PLAN NOTE
· Patient initially came into the ED with diapers wrapped around both of her legs and reported left heel pain over the last few days  Patient noted to have open wounds on both right and left leg with signs of cellulitis  · About 1 year ago patient reports she had been seeing wound care but stopped due to her wounds improving and fear of jennifer COVID-19  · Leukocytosis resolved  · ED started patient on Cefepime and originally had been transitioned to Ancef, will broadened to cefepime/vancomycin due to diabetic ulcer with drainage  · Podiatry consult appreciated  · Wound care and dressing changes per Podiatry  · DC antibiotics  · Cultures are negative to date    · Trend WBC and fever curve

## 2021-03-21 NOTE — ASSESSMENT & PLAN NOTE
· Hemoglobin 8 3   · No signs of active or overt bleeding  · Hemoglobin this morning is 7 0  · Monitor H&H and transfuse as needed  · Baseline from about 1 year ago about 9  · Iron panel noted and stool for occult blood

## 2021-03-21 NOTE — PROGRESS NOTES
Progress Note - Podiatry  Maximiliano Carrion 60 y o  female MRN: [id number]  Unit/Bed#: -01 Encounter: [id number]    Assessment/Plan:    1  Skin ulceration left heel partial depth with DM2              -dressing change to be done nursing  2  Cellulitis bilateral legs              -secondary to #3                -diminishing erythema and edema bilateral resolving satisfactorily  3  Venous hypertension with ulceration and inflammation bilateral legs   -nursing advised to encourage elevation of extremities and to avoid dependence at all times  -multiple wounds bilateral legs  No strike through drainage noted              -continue with Xeroform, ABDs, Kerlix, and six-inch Ace wraps QOD   -dressing changed by nursing  4  Noncompliance with medication, anemia, ADI, essential hypertension, morbid obesity, DM2              -managed per Internal Medicine and Nephrology      Subjective/Objective   Chief Complaint:   Chief Complaint   Patient presents with    Foot Pain     pt c/o left heel pain  pt has had pain for few days  pt also has ulcers on top of left foot  Subjective:  67-year-old white female resting comfortably in bedside recliner with legs dependent  Relates no new or significant pain over the past 24 hours from her legs and left foot  Denies any fever shortness of breath  Blood pressure 141/57, pulse 59, temperature (!) 97 4 °F (36 3 °C), resp  rate 18, height 5' 4" (1 626 m), weight 119 kg (262 lb 8 oz), SpO2 94 %, not currently breastfeeding  ,Body mass index is 45 06 kg/m²      Invasive Devices     Peripheral Intravenous Line            Peripheral IV 03/20/21 Right Antecubital less than 1 day                Physical Exam:   General appearance: alert, cooperative and no distress  Neuro/Vascular: Normal strength  Sensation and reflexes:  Diminished epicritic sensation  Pulses: Right: DP 0/4, PT 0/4, Left: DP 0/4, PT 0/4  Capillary Filling:  3 Sec, Edema:  + 2 - +3 edema bilateral  Extremity: Ulcers/Wounds:   · Left heel:  3 0 x 3 0 x 0 1 cm partial depth ulcerative breakdown posterior lateral aspect, minimal serosanguineous drainage, evidence of old DD removed blister, 50% yellow, 50% pink/red, no evidence of acute infection  Pain with palpation  · Bilateral legs:  Multiple partial depth venous hypertension ulcerations with moderate serosanguineous drainage  Erythema appears be diminishing with less calor noted  Wounds granulating satisfactorily  Overall legs are improving  Edema has also been diminishing over the past 24 hours            Labs and other studies:   CBC w/diff  Results from last 7 days   Lab Units 03/21/21  0522   WBC Thousand/uL 9 57   HEMOGLOBIN g/dL 7 0*   HEMATOCRIT % 24 4*   PLATELETS Thousands/uL 209   NEUTROS PCT % 70   LYMPHS PCT % 7*   MONOS PCT % 8   EOS PCT % 15*     BMP  Results from last 7 days   Lab Units 03/21/21  0522   POTASSIUM mmol/L 4 3   CHLORIDE mmol/L 113*   CO2 mmol/L 18*   BUN mg/dL 72*   CREATININE mg/dL 4 37*   CALCIUM mg/dL 7 4*     CMP  Results from last 7 days   Lab Units 03/21/21  0522 03/20/21  0537   POTASSIUM mmol/L 4 3 4 3   CHLORIDE mmol/L 113* 113*   CO2 mmol/L 18* 18*   BUN mg/dL 72* 71*   CREATININE mg/dL 4 37* 3 93*   CALCIUM mg/dL 7 4* 7 6*   ALK PHOS U/L  --  89   ALT U/L  --  13   AST U/L  --  15       @Culture@  Lab Results   Component Value Date    BLOODCX No Growth After 5 Days  03/15/2021    BLOODCX No Growth After 5 Days   03/15/2021    URINECX <10,000 cfu/ml  03/16/2021     No results found for: WOUNDCULT      Current Facility-Administered Medications:     acetaminophen (TYLENOL) tablet 650 mg, 650 mg, Oral, Q6H PRN, Sarah Sanchez PA-C, 650 mg at 03/17/21 2320    aspirin (ECOTRIN LOW STRENGTH) EC tablet 81 mg, 81 mg, Oral, Daily, Sarah Sanchez PA-C, 81 mg at 03/21/21 0846    atorvastatin (LIPITOR) tablet 40 mg, 40 mg, Oral, Daily, Sarah Sanchez PA-C, 40 mg at 03/21/21 0847    calcium acetate (PHOSLO) capsule 663 mg, 667 mg, Oral, TID With Meals, HOWARD Gilmore, 667 mg at 03/21/21 0847    carvedilol (COREG) tablet 25 mg, 25 mg, Oral, BID, Sarah Sanchez PA-C, 25 mg at 03/21/21 0846    clopidogrel (PLAVIX) tablet 75 mg, 75 mg, Oral, Daily, Sarah Sanchez PA-C, 75 mg at 03/21/21 0847    diphenhydrAMINE (BENADRYL) tablet 25 mg, 25 mg, Oral, Q6H PRN, Denise Todd PA-C, 25 mg at 03/17/21 0545    ferrous sulfate tablet 325 mg, 325 mg, Oral, Daily With Breakfast, Twin Meier MD, 325 mg at 03/21/21 0847    heparin (porcine) subcutaneous injection 5,000 Units, 5,000 Units, Subcutaneous, Q8H [address], 5,000 Units at 03/21/21 0528 **AND** [COMPLETED] Platelet count, , , Once, Denise Todd PA-C    hydrALAZINE (APRESOLINE) tablet 75 mg, 75 mg, Oral, BID, Sarah Sanchez PA-C, 75 mg at 03/21/21 0847    insulin glargine (LANTUS) subcutaneous injection 30 Units 0 3 mL, 30 Units, Subcutaneous, QAM, Danielle Francisco MD, 30 Units at 03/21/21 0854    insulin lispro (HumaLOG) 100 units/mL subcutaneous injection 1-6 Units, 1-6 Units, Subcutaneous, TID AC, Stopped at 03/19/21 1725 **AND** Fingerstick Glucose (POCT), , , TID AC, Sarah Sanchez PA-C    insulin lispro (HumaLOG) 100 units/mL subcutaneous injection 1-6 Units, 1-6 Units, Subcutaneous, HS, Sarah Sanchez PA-C, 1 Units at 03/19/21 2221    ondansetron (ZOFRAN) injection 4 mg, 4 mg, Intravenous, Q6H PRN, Danielle Francisco MD, 4 mg at 03/19/21 1350    sodium bicarbonate tablet 1,300 mg, 1,300 mg, Oral, 4x Daily, HOWARD Puente, 1,300 mg at 03/21/21 0845    vancomycin (VANCOCIN) 500 mg in sodium chloride 0 9% 100 mL IVPB, 7 5 mg/kg (Adjusted), Intravenous, Daily PRN, Yamini Srinivasan PA-C    Imaging: I have personally reviewed pertinent films in PACS  EKG, Pathology, and Other Studies: I have personally reviewed pertinent reports    VTE Pharmacologic Prophylaxis: Heparin  VTE Mechanical Prophylaxis: reason for no mechanical VTE prophylaxis Bilateral leg wounds    Janet Shown, DPM

## 2021-03-21 NOTE — ASSESSMENT & PLAN NOTE
Lab Results   Component Value Date    EGFR 10 03/21/2021    EGFR 11 03/20/2021    EGFR 12 03/19/2021    CREATININE 4 37 (H) 03/21/2021    CREATININE 3 93 (H) 03/20/2021    CREATININE 3 67 (H) 03/19/2021     · Creatinine 3 23 on admission   · Creatinine around 2 06 1 year ago  · Consult Nephrology - appreciate recommendations  · Patient received IV albumin x1 in addition to 80 mg IV Lasix x1 - may have cardiorenal component given noncompliance with torsemide  · Creatinine this morning is 4 37  · Patient received 2 doses of Bumex yesterday on 03/17  · Diuretics on hold    · Patient is receiving 3rd dose of albumin as per Nephrology recommendation  · Diuretics per Nephrology  · Ultrasound did not reveal any hydronephrosis  · Avoid hypotension/nephrotoxins medication  · Trend BMP

## 2021-03-22 PROBLEM — D64.9 ANEMIA: Status: ACTIVE | Noted: 2021-03-22

## 2021-03-22 PROBLEM — D63.1 ANEMIA DUE TO STAGE 4 CHRONIC KIDNEY DISEASE (HCC): Status: ACTIVE | Noted: 2021-03-15

## 2021-03-22 PROBLEM — N18.4 ANEMIA DUE TO STAGE 4 CHRONIC KIDNEY DISEASE (HCC): Status: ACTIVE | Noted: 2021-03-15

## 2021-03-22 PROBLEM — E83.39 HYPERPHOSPHATEMIA: Status: ACTIVE | Noted: 2021-03-22

## 2021-03-22 PROBLEM — E87.2 ACIDOSIS: Status: ACTIVE | Noted: 2021-03-22

## 2021-03-22 LAB
ALBUMIN SERPL ELPH-MCNC: 2.86 G/DL (ref 3.5–5)
ALBUMIN SERPL ELPH-MCNC: 48.5 % (ref 52–65)
ALPHA1 GLOB SERPL ELPH-MCNC: 0.49 G/DL (ref 0.1–0.4)
ALPHA1 GLOB SERPL ELPH-MCNC: 8.3 % (ref 2.5–5)
ALPHA2 GLOB SERPL ELPH-MCNC: 0.84 G/DL (ref 0.4–1.2)
ALPHA2 GLOB SERPL ELPH-MCNC: 14.2 % (ref 7–13)
ANION GAP SERPL CALCULATED.3IONS-SCNC: 16 MMOL/L (ref 4–13)
BACTERIA UR QL AUTO: ABNORMAL /HPF
BASOPHILS # BLD AUTO: 0.03 THOUSANDS/ΜL (ref 0–0.1)
BASOPHILS NFR BLD AUTO: 0 % (ref 0–1)
BETA GLOB ABNORMAL SERPL ELPH-MCNC: 0.38 G/DL (ref 0.4–0.8)
BETA1 GLOB SERPL ELPH-MCNC: 6.5 % (ref 5–13)
BETA2 GLOB SERPL ELPH-MCNC: 7.5 % (ref 2–8)
BETA2+GAMMA GLOB SERPL ELPH-MCNC: 0.44 G/DL (ref 0.2–0.5)
BILIRUB UR QL STRIP: NEGATIVE
BUN SERPL-MCNC: 75 MG/DL (ref 5–25)
CALCIUM SERPL-MCNC: 7.9 MG/DL (ref 8.3–10.1)
CHLORIDE SERPL-SCNC: 112 MMOL/L (ref 100–108)
CLARITY UR: ABNORMAL
CO2 SERPL-SCNC: 18 MMOL/L (ref 21–32)
COLOR UR: YELLOW
CREAT SERPL-MCNC: 4.5 MG/DL (ref 0.6–1.3)
EOSINOPHIL # BLD AUTO: 1.46 THOUSAND/ΜL (ref 0–0.61)
EOSINOPHIL NFR BLD AUTO: 15 % (ref 0–6)
ERYTHROCYTE [DISTWIDTH] IN BLOOD BY AUTOMATED COUNT: 16.2 % (ref 11.6–15.1)
FINE GRAN CASTS URNS QL MICRO: ABNORMAL /LPF
GAMMA GLOB ABNORMAL SERPL ELPH-MCNC: 0.89 G/DL (ref 0.5–1.6)
GAMMA GLOB SERPL ELPH-MCNC: 15 % (ref 12–22)
GFR SERPL CREATININE-BSD FRML MDRD: 10 ML/MIN/1.73SQ M
GLUCOSE SERPL-MCNC: 129 MG/DL (ref 65–140)
GLUCOSE SERPL-MCNC: 147 MG/DL (ref 65–140)
GLUCOSE SERPL-MCNC: 156 MG/DL (ref 65–140)
GLUCOSE SERPL-MCNC: 91 MG/DL (ref 65–140)
GLUCOSE SERPL-MCNC: 98 MG/DL (ref 65–140)
GLUCOSE UR STRIP-MCNC: ABNORMAL MG/DL
HCT VFR BLD AUTO: 24.4 % (ref 34.8–46.1)
HGB BLD-MCNC: 7.3 G/DL (ref 11.5–15.4)
HGB UR QL STRIP.AUTO: NEGATIVE
HIV 1+2 AB+HIV1 P24 AG SERPL QL IA: NORMAL
HIV1 P24 AG SER QL: NORMAL
HYALINE CASTS #/AREA URNS LPF: ABNORMAL /LPF
IGG/ALB SER: 0.94 {RATIO} (ref 1.1–1.8)
IMM GRANULOCYTES # BLD AUTO: 0.04 THOUSAND/UL (ref 0–0.2)
IMM GRANULOCYTES NFR BLD AUTO: 0 % (ref 0–2)
KETONES UR STRIP-MCNC: NEGATIVE MG/DL
LEUKOCYTE ESTERASE UR QL STRIP: NEGATIVE
LYMPHOCYTES # BLD AUTO: 0.9 THOUSANDS/ΜL (ref 0.6–4.47)
LYMPHOCYTES NFR BLD AUTO: 9 % (ref 14–44)
MAGNESIUM SERPL-MCNC: 2.1 MG/DL (ref 1.6–2.6)
MCH RBC QN AUTO: 27.8 PG (ref 26.8–34.3)
MCHC RBC AUTO-ENTMCNC: 29.9 G/DL (ref 31.4–37.4)
MCV RBC AUTO: 93 FL (ref 82–98)
MONOCYTES # BLD AUTO: 0.75 THOUSAND/ΜL (ref 0.17–1.22)
MONOCYTES NFR BLD AUTO: 8 % (ref 4–12)
NEUTROPHILS # BLD AUTO: 6.61 THOUSANDS/ΜL (ref 1.85–7.62)
NEUTS SEG NFR BLD AUTO: 68 % (ref 43–75)
NITRITE UR QL STRIP: NEGATIVE
NON-SQ EPI CELLS URNS QL MICRO: ABNORMAL /HPF
NRBC BLD AUTO-RTO: 0 /100 WBCS
OTHER STN SPEC: ABNORMAL
PH UR STRIP.AUTO: 6 [PH]
PLATELET # BLD AUTO: 197 THOUSANDS/UL (ref 149–390)
PMV BLD AUTO: 10.2 FL (ref 8.9–12.7)
POTASSIUM SERPL-SCNC: 4.4 MMOL/L (ref 3.5–5.3)
PROT PATTERN SERPL ELPH-IMP: ABNORMAL
PROT SERPL-MCNC: 5.9 G/DL (ref 6.4–8.2)
PROT UR STRIP-MCNC: >=300 MG/DL
RBC # BLD AUTO: 2.63 MILLION/UL (ref 3.81–5.12)
RBC #/AREA URNS AUTO: ABNORMAL /HPF
SODIUM SERPL-SCNC: 146 MMOL/L (ref 136–145)
SP GR UR STRIP.AUTO: 1.02 (ref 1–1.03)
UROBILINOGEN UR QL STRIP.AUTO: 0.2 E.U./DL
WBC # BLD AUTO: 9.79 THOUSAND/UL (ref 4.31–10.16)
WBC #/AREA URNS AUTO: ABNORMAL /HPF

## 2021-03-22 PROCEDURE — 80048 BASIC METABOLIC PNL TOTAL CA: CPT | Performed by: INTERNAL MEDICINE

## 2021-03-22 PROCEDURE — 85025 COMPLETE CBC W/AUTO DIFF WBC: CPT | Performed by: INTERNAL MEDICINE

## 2021-03-22 PROCEDURE — 86063 ANTISTREPTOLYSIN O SCREEN: CPT | Performed by: INTERNAL MEDICINE

## 2021-03-22 PROCEDURE — 83883 ASSAY NEPHELOMETRY NOT SPEC: CPT | Performed by: INTERNAL MEDICINE

## 2021-03-22 PROCEDURE — 86255 FLUORESCENT ANTIBODY SCREEN: CPT | Performed by: INTERNAL MEDICINE

## 2021-03-22 PROCEDURE — 82595 ASSAY OF CRYOGLOBULIN: CPT | Performed by: INTERNAL MEDICINE

## 2021-03-22 PROCEDURE — 87806 HIV AG W/HIV1&2 ANTB W/OPTIC: CPT | Performed by: INTERNAL MEDICINE

## 2021-03-22 PROCEDURE — 82948 REAGENT STRIP/BLOOD GLUCOSE: CPT

## 2021-03-22 PROCEDURE — 99233 SBSQ HOSP IP/OBS HIGH 50: CPT | Performed by: INTERNAL MEDICINE

## 2021-03-22 PROCEDURE — 86592 SYPHILIS TEST NON-TREP QUAL: CPT | Performed by: INTERNAL MEDICINE

## 2021-03-22 PROCEDURE — 84300 ASSAY OF URINE SODIUM: CPT | Performed by: NURSE PRACTITIONER

## 2021-03-22 PROCEDURE — 86225 DNA ANTIBODY NATIVE: CPT | Performed by: INTERNAL MEDICINE

## 2021-03-22 PROCEDURE — 86430 RHEUMATOID FACTOR TEST QUAL: CPT | Performed by: INTERNAL MEDICINE

## 2021-03-22 PROCEDURE — 82436 ASSAY OF URINE CHLORIDE: CPT | Performed by: NURSE PRACTITIONER

## 2021-03-22 PROCEDURE — 81001 URINALYSIS AUTO W/SCOPE: CPT | Performed by: INTERNAL MEDICINE

## 2021-03-22 PROCEDURE — 83520 IMMUNOASSAY QUANT NOS NONAB: CPT | Performed by: INTERNAL MEDICINE

## 2021-03-22 PROCEDURE — 82570 ASSAY OF URINE CREATININE: CPT | Performed by: NURSE PRACTITIONER

## 2021-03-22 PROCEDURE — 83735 ASSAY OF MAGNESIUM: CPT | Performed by: INTERNAL MEDICINE

## 2021-03-22 RX ORDER — AMLODIPINE BESYLATE 2.5 MG/1
2.5 TABLET ORAL DAILY
Status: DISCONTINUED | OUTPATIENT
Start: 2021-03-22 | End: 2021-03-23

## 2021-03-22 RX ORDER — HYDRALAZINE HYDROCHLORIDE 25 MG/1
75 TABLET, FILM COATED ORAL 2 TIMES DAILY
Status: DISCONTINUED | OUTPATIENT
Start: 2021-03-22 | End: 2021-03-30

## 2021-03-22 RX ORDER — CARVEDILOL 25 MG/1
25 TABLET ORAL 2 TIMES DAILY
Status: DISCONTINUED | OUTPATIENT
Start: 2021-03-22 | End: 2021-04-01

## 2021-03-22 RX ADMIN — HEPARIN SODIUM 5000 UNITS: 5000 INJECTION INTRAVENOUS; SUBCUTANEOUS at 21:09

## 2021-03-22 RX ADMIN — HYDRALAZINE HYDROCHLORIDE 75 MG: 25 TABLET, FILM COATED ORAL at 21:03

## 2021-03-22 RX ADMIN — HYDRALAZINE HYDROCHLORIDE 75 MG: 25 TABLET, FILM COATED ORAL at 08:26

## 2021-03-22 RX ADMIN — INSULIN GLARGINE 30 UNITS: 100 INJECTION, SOLUTION SUBCUTANEOUS at 08:26

## 2021-03-22 RX ADMIN — ATORVASTATIN CALCIUM 40 MG: 40 TABLET, FILM COATED ORAL at 08:26

## 2021-03-22 RX ADMIN — CARVEDILOL 25 MG: 12.5 TABLET, FILM COATED ORAL at 17:21

## 2021-03-22 RX ADMIN — SODIUM BICARBONATE 650 MG TABLET 1300 MG: at 21:09

## 2021-03-22 RX ADMIN — FERROUS SULFATE TAB 325 MG (65 MG ELEMENTAL FE) 325 MG: 325 (65 FE) TAB at 08:27

## 2021-03-22 RX ADMIN — CALCIUM ACETATE 667 MG: 667 CAPSULE ORAL at 17:20

## 2021-03-22 RX ADMIN — ASPIRIN 81 MG: 81 TABLET, COATED ORAL at 08:26

## 2021-03-22 RX ADMIN — DEXTROSE 250 ML: 5 SOLUTION INTRAVENOUS at 11:31

## 2021-03-22 RX ADMIN — AMLODIPINE BESYLATE 2.5 MG: 2.5 TABLET ORAL at 11:27

## 2021-03-22 RX ADMIN — SODIUM BICARBONATE 650 MG TABLET 1300 MG: at 11:28

## 2021-03-22 RX ADMIN — HEPARIN SODIUM 5000 UNITS: 5000 INJECTION INTRAVENOUS; SUBCUTANEOUS at 05:17

## 2021-03-22 RX ADMIN — INSULIN LISPRO 1 UNITS: 100 INJECTION, SOLUTION INTRAVENOUS; SUBCUTANEOUS at 17:18

## 2021-03-22 RX ADMIN — HEPARIN SODIUM 5000 UNITS: 5000 INJECTION INTRAVENOUS; SUBCUTANEOUS at 14:38

## 2021-03-22 RX ADMIN — SODIUM BICARBONATE 650 MG TABLET 1300 MG: at 17:20

## 2021-03-22 RX ADMIN — SODIUM BICARBONATE 650 MG TABLET 1300 MG: at 08:26

## 2021-03-22 RX ADMIN — CALCIUM ACETATE 667 MG: 667 CAPSULE ORAL at 11:27

## 2021-03-22 RX ADMIN — CARVEDILOL 25 MG: 12.5 TABLET, FILM COATED ORAL at 08:26

## 2021-03-22 RX ADMIN — CALCIUM ACETATE 667 MG: 667 CAPSULE ORAL at 08:26

## 2021-03-22 RX ADMIN — CLOPIDOGREL BISULFATE 75 MG: 75 TABLET ORAL at 08:26

## 2021-03-22 NOTE — PLAN OF CARE
Problem: Potential for Falls  Goal: Patient will remain free of falls  Description: INTERVENTIONS:  - Assess patient frequently for physical needs  -  Identify cognitive and physical deficits and behaviors that affect risk of falls    -  [place] fall precautions as indicated by assessment   - Educate patient/family on patient safety including physical limitations  - Instruct patient to call for assistance with activity based on assessment  - Modify environment to reduce risk of injury  - Consider OT/PT consult to assist with strengthening/mobility  Outcome: Progressing     Problem: PAIN - ADULT  Goal: Verbalizes/displays adequate comfort level or baseline comfort level  Description: Interventions:  - Encourage patient to monitor pain and request assistance  - Assess pain using appropriate pain scale  - Administer analgesics based on type and severity of pain and evaluate response  - Implement non-pharmacological measures as appropriate and evaluate response  - Consider cultural and social influences on pain and pain management  - Notify physician/advanced practitioner if interventions unsuccessful or patient reports new pain  Outcome: Progressing     Problem: INFECTION - ADULT  Goal: Absence or prevention of progression during hospitalization  Description: INTERVENTIONS:  - Assess and monitor for signs and symptoms of infection  - Monitor lab/diagnostic results  - Monitor all insertion sites, i e  indwelling lines, tubes, and drains  - Monitor endotracheal if appropriate and nasal secretions for changes in amount and color  - [place] appropriate cooling/warming therapies per order  - Administer medications as ordered  - Instruct and encourage patient and family to use good hand hygiene technique  - Identify and instruct in appropriate isolation precautions for identified infection/condition  Outcome: Progressing  Goal: Absence of fever/infection during neutropenic period  Description: INTERVENTIONS:  - Monitor WBC    Outcome: Progressing     Problem: SAFETY ADULT  Goal: Patient will remain free of falls  Description: INTERVENTIONS:  - Assess patient frequently for physical needs  -  Identify cognitive and physical deficits and behaviors that affect risk of falls    -  [place] fall precautions as indicated by assessment   - Educate patient/family on patient safety including physical limitations  - Instruct patient to call for assistance with activity based on assessment  - Modify environment to reduce risk of injury  - Consider OT/PT consult to assist with strengthening/mobility  Outcome: Progressing  Goal: Maintain or return to baseline ADL function  Description: INTERVENTIONS:  -  Assess patient's ability to carry out ADLs; assess patient's baseline for ADL function and identify physical deficits which impact ability to perform ADLs (bathing, care of mouth/teeth, toileting, grooming, dressing, etc )  - Assess/evaluate cause of self-care deficits   - Assess range of motion  - Assess patient's mobility; develop plan if impaired  - Assess patient's need for assistive devices and provide as appropriate  - Encourage maximum independence but intervene and supervise when necessary  - Involve family in performance of ADLs  - Assess for home care needs following discharge   - Consider OT consult to assist with ADL evaluation and planning for discharge  - Provide patient education as appropriate  Outcome: Progressing  Goal: Maintain or return mobility status to optimal level  Description: INTERVENTIONS:  - Assess patient's baseline mobility status (ambulation, transfers, stairs, etc )    - Identify cognitive and physical deficits and behaviors that affect mobility  - Identify mobility aids required to assist with transfers and/or ambulation (gait belt, sit-to-stand, lift, walker, cane, etc )  - [place] fall precautions as indicated by assessment  - Record patient progress and toleration of activity level on Mobility SBAR; progress patient to next Phase/Stage  - Instruct patient to call for assistance with activity based on assessment  - Consider rehabilitation consult to assist with strengthening/weightbearing, etc   Outcome: Progressing     Problem: DISCHARGE PLANNING  Goal: Discharge to home or other facility with appropriate resources  Description: INTERVENTIONS:  - Identify barriers to discharge w/patient and caregiver  - Arrange for needed discharge resources and transportation as appropriate  - Identify discharge learning needs (meds, wound care, etc )  - Arrange for interpretive services to assist at discharge as needed  - Refer to Case Management Department for coordinating discharge planning if the patient needs post-hospital services based on physician/advanced practitioner order or complex needs related to functional status, cognitive ability, or social support system  Outcome: Progressing     Problem: Knowledge Deficit  Goal: Patient/family/caregiver demonstrates understanding of disease process, treatment plan, medications, and discharge instructions  Description: Complete learning assessment and assess knowledge base    Interventions:  - Provide teaching at level of understanding  - Provide teaching via preferred learning methods  Outcome: Progressing     Problem: CARDIOVASCULAR - ADULT  Goal: Maintains optimal cardiac output and hemodynamic stability  Description: INTERVENTIONS:  - Monitor I/O, vital signs and rhythm  - Monitor for S/S and trends of decreased cardiac output  - Administer and titrate ordered vasoactive medications to optimize hemodynamic stability  - Assess quality of pulses, skin color and temperature  - Assess for signs of decreased coronary artery perfusion  - Instruct patient to report change in severity of symptoms  Outcome: Progressing  Goal: Absence of cardiac dysrhythmias or at baseline rhythm  Description: INTERVENTIONS:  - Continuous cardiac monitoring, vital signs, obtain 12 lead EKG if ordered  - Administer antiarrhythmic and heart rate control medications as ordered  - Monitor electrolytes and administer replacement therapy as ordered  Outcome: Progressing     Problem: SKIN/TISSUE INTEGRITY - ADULT  Goal: Skin integrity remains intact  Description: INTERVENTIONS  - Identify patients at risk for skin breakdown  - Assess and monitor skin integrity  - Assess and monitor nutrition and hydration status  - Monitor labs (i e  albumin)  - Assess for incontinence   - Turn and reposition patient  - Assist with mobility/ambulation  - Relieve pressure over bony prominences  - Avoid friction and shearing  - Provide appropriate hygiene as needed including keeping skin clean and dry  - Evaluate need for skin moisturizer/barrier cream  - Collaborate with interdisciplinary team (i e  Nutrition, Rehabilitation, etc )   - Patient/family teaching  Outcome: Progressing  Goal: Incision(s), wounds(s) or drain site(s) healing without S/S of infection  Description: INTERVENTIONS  - Assess and document risk factors for skin impairment   - Assess and document dressing, incision, wound bed, drain sites and surrounding tissue  - Consider nutrition services referral as needed  - Oral mucous membranes remain intact  - Provide patient/ family education  Outcome: Progressing  Goal: Oral mucous membranes remain intact  Description: INTERVENTIONS  - Assess oral mucosa and hygiene practices  - Implement preventative oral hygiene regimen  - Implement oral medicated treatments as ordered  - Initiate Nutrition services referral as needed  Outcome: Progressing     Problem: Prexisting or High Potential for Compromised Skin Integrity  Goal: Skin integrity is maintained or improved  Description: INTERVENTIONS:  - Identify patients at risk for skin breakdown  - Assess and monitor skin integrity  - Assess and monitor nutrition and hydration status  - Monitor labs   - Assess for incontinence   - Turn and reposition patient  - Assist with mobility/ambulation  - Relieve pressure over bony prominences  - Avoid friction and shearing  - Provide appropriate hygiene as needed including keeping skin clean and dry  - Evaluate need for skin moisturizer/barrier cream  - Collaborate with interdisciplinary team   - Patient/family teaching  - Consider wound care consult   Outcome: Progressing     Problem: Nutrition/Hydration-ADULT  Goal: Nutrient/Hydration intake appropriate for improving, restoring or maintaining nutritional needs  Description: Monitor and assess patient's nutrition/hydration status for malnutrition  Collaborate with interdisciplinary team and initiate plan and interventions as ordered  Monitor patient's weight and dietary intake as ordered or per policy  Utilize nutrition screening tool and intervene as necessary  Determine patient's food preferences and provide high-protein, high-caloric foods as appropriate       INTERVENTIONS:  - Monitor oral intake, urinary output, labs, and treatment plans  - Assess nutrition and hydration status and recommend course of action  - Evaluate amount of meals eaten  - Assist patient with eating if necessary   - Allow adequate time for meals  - Recommend/ encourage appropriate diets, oral nutritional supplements, and vitamin/mineral supplements  - Order, calculate, and assess calorie counts as needed  - Recommend, monitor, and adjust tube feedings and TPN/PPN based on assessed needs  - Assess need for intravenous fluids  - Provide specific nutrition/hydration education as appropriate  - Include patient/family/caregiver in decisions related to nutrition  Outcome: Progressing     Problem: RESPIRATORY - ADULT  Goal: Achieves optimal ventilation and oxygenation  Description: INTERVENTIONS:  - Assess for changes in respiratory status  - Assess for changes in mentation and behavior  - Position to facilitate oxygenation and minimize respiratory effort  - Oxygen administered by appropriate delivery if ordered  - Initiate smoking cessation education as indicated  - Encourage broncho-pulmonary hygiene including cough, deep breathe, Incentive Spirometry  - Assess the need for suctioning and aspirate as needed  - Assess and instruct to report SOB or any respiratory difficulty  - Respiratory Therapy support as indicated  Outcome: Progressing     Problem: METABOLIC, FLUID AND ELECTROLYTES - ADULT  Goal: Electrolytes maintained within normal limits  Description: INTERVENTIONS:  - Monitor labs and assess patient for signs and symptoms of electrolyte imbalances  - Administer electrolyte replacement as ordered  - Monitor response to electrolyte replacements, including repeat lab results as appropriate  - Instruct patient on fluid and nutrition as appropriate  Outcome: Progressing  Goal: Fluid balance maintained  Description: INTERVENTIONS:  - Monitor labs   - Monitor I/O and WT  - Instruct patient on fluid and nutrition as appropriate  - Assess for signs & symptoms of volume excess or deficit  Outcome: Progressing  Goal: Glucose maintained within target range  Description: INTERVENTIONS:  - Monitor Blood Glucose as ordered  - Assess for signs and symptoms of hyperglycemia and hypoglycemia  - Administer ordered medications to maintain glucose within target range  - Assess nutritional intake and initiate nutrition service referral as needed  Outcome: Progressing     Problem: HEMATOLOGIC - ADULT  Goal: Maintains hematologic stability  Description: INTERVENTIONS  - Assess for signs and symptoms of bleeding or hemorrhage  - Monitor labs  - Administer supportive blood products/factors as ordered and appropriate  Outcome: Progressing     Problem: MUSCULOSKELETAL - ADULT  Goal: Maintain or return mobility to safest level of function  Description: INTERVENTIONS:  - Assess patient's ability to carry out ADLs; assess patient's baseline for ADL function and identify physical deficits which impact ability to perform ADLs (bathing, care of mouth/teeth, toileting, grooming, dressing, etc )  - Assess/evaluate cause of self-care deficits   - Assess range of motion  - Assess patient's mobility  - Assess patient's need for assistive devices and provide as appropriate  - Encourage maximum independence but intervene and supervise when necessary  - Involve family in performance of ADLs  - Assess for home care needs following discharge   - Consider OT consult to assist with ADL evaluation and planning for discharge  - Provide patient education as appropriate  Outcome: Progressing

## 2021-03-22 NOTE — PROGRESS NOTES
[place]  Progress Note - Brennan Schmidt 1955, 60 y o  female MRN: [id number]  Unit/Bed#: -01 Encounter: [id number]  Primary Care Provider: Milly Freeman DO   Date and time admitted to hospital: 3/15/2021  7:09 PM    * Acute on chronic combined systolic and diastolic congestive heart failure (HCC)  Assessment & Plan  Wt Readings from Last 3 Encounters:   03/22/21 120 kg (264 lb 1 6 oz)   02/21/20 117 kg (258 lb)   01/07/20 117 kg (258 lb 12 8 oz)     Patient admitted with increased lower extremity edema due to acute systolic CHF due to noncompliance with medications  Ejection fraction was 45-50% on this admission  Patient was treated with IV Bumex then IV diuretics were held because of worsening renal function    Acute systolic CHF resolved lungs are clear to auscultation, oxygen saturation is 90-92% on room air  Patient still has lower extremity edema    I discussed the case with patient's daughter at the bedside in detail March 22nd      Acute kidney injury superimposed on chronic kidney disease [facility])  Assessment & Plan  Lab Results   Component Value Date    EGFR 10 03/22/2021    EGFR 10 03/21/2021    EGFR 11 03/20/2021    CREATININE 4 50 (H) 03/22/2021    CREATININE 4 37 (H) 03/21/2021    CREATININE 3 93 (H) 03/20/2021   ·   Patient has stage IV chronic disease with baseline creatinine between 1 49-2  15  She presented with acute kidney injury  She has nephrotic range proteinuria  Nephrology was consulted  Her renal function is worsening  Nephrology plans to discuss kidney biopsy  D/w dr Belle Heal    Cellulitis of both lower extremities  Assessment & Plan    Patient admitted with bilateral lower extremity cellulitis and was treated with IV cefepime and vancomycin  cellulitis resolved      Type 2 diabetes mellitus with stage 4 chronic kidney disease, with long-term current use of insulin [facility])  Assessment & Plan  Lab Results   Component Value Date    HGBA1C 7 6 (H) 03/15/2021       Recent Labs     21  1544 21  2117 21  0744 21  1125   POCGLU 89 163* 98 129       Blood Sugar Average: Last 72 hrs:  · (P) 106 25     Patient has type 2 diabetes on insulin with stage IV chronic disease  Her blood sugars are controlled    Continue Lantus and pre meal Humalog    Anemia due to stage 4 chronic kidney disease [facility])  Assessment & Plan  Patient has normocytic anemia  Iron studies revealed anemia chronic disease likely due to chronic disease  Patient denies any signs of bleeding  Will monitor hemoglobin closely    Coronary artery disease involving native coronary artery of native heart without angina pectoris  Assessment & Plan  Patient has coronary disease and she status post stent placements in 2018  She denies any chest pain or shortness of breath currently  I am holding aspirin and Plavix before possible renal biopsy  Continue atorvastatin        VTE Prophylaxis: in place    Patient Centered Rounds: I rounded with patient's nurse    Current Length of Stay: 7 day(s)    Current Patient Status: Inpatient    Certification Statement: Pt requires additional inpatient hospital stay due to: see assessment and plan        Subjective:   Patient denies chest pain, shortness of breath, abdominal pain, signs of bleeding, difficulty urinating  Earlier this admission she had frequent loose stools but that has resolved  All other ROS are negative    Objective:     Vitals:   Temp (24hrs), Av 4 °F (36 9 °C), Min:98 1 °F (36 7 °C), Max:98 6 °F (37 °C)    Temp:  [98 1 °F (36 7 °C)-98 6 °F (37 °C)] 98 1 °F (36 7 °C)  HR:  [62-68] 62  Resp:  [18] 18  BP: (156-162)/(65-67) 156/66  SpO2:  [89 %-92 %] 90 %  Body mass index is 45 33 kg/m²  Input and Output Summary (last 24 hours):        Intake/Output Summary (Last 24 hours) at 3/22/2021 1437  Last data filed at 3/22/2021 0828  Gross per 24 hour   Intake 240 ml   Output 750 ml   Net -510 ml Physical Exam:     Physical Exam  HENT:      Head: Normocephalic  Eyes:      Conjunctiva/sclera: Conjunctivae normal    Neck:      Musculoskeletal: Neck supple  Cardiovascular:      Rate and Rhythm: Normal rate and regular rhythm  Heart sounds: Murmur (Systolic murmur is present) present  Pulmonary:      Effort: No respiratory distress  Breath sounds: No wheezing or rales  Abdominal:      General: Bowel sounds are normal       Palpations: Abdomen is soft  Tenderness: There is no abdominal tenderness  Skin:     General: Skin is warm and dry  Findings: Rash ( do superficial excoriations and ulcers are present on the thigh without cellulitis) present  Comments: Lower extremities were wrapped, edema was present   Neurological:      Mental Status: She is alert and oriented to person, place, and time  Cranial Nerves: No cranial nerve deficit  Comments: Patient moves all extremities on command   Psychiatric:         Mood and Affect: Mood normal              I personally reviewed labs and imaging reports for today        Last 24 Hours Medication List:   Current Facility-Administered Medications   Medication Dose Route Frequency Provider Last Rate    acetaminophen  650 mg Oral Q6H PRN Cliffton Ped, PA-C      amLODIPine  2 5 mg Oral Daily Lenteena Box, CRNP      atorvastatin  40 mg Oral Daily Cliffton Ped, PA-C      calcium acetate  667 mg Oral TID With Meals HOWARD Ayala      carvedilol  25 mg Oral BID Agusto Box, CRNP      dextrose  250 mL Intravenous Once Agusto Box CRNP 250 mL (03/22/21 1131)    diphenhydrAMINE  25 mg Oral Q6H PRN Cliffton Ped, PA-C      ferrous sulfate  325 mg Oral Daily With Breakfast Sandhya Gaspar MD      heparin (porcine)  5,000 Units Subcutaneous [facility] Sarah Sanchez PA-C      hydrALAZINE  75 mg Oral BID Agusto Box CRNP      insulin glargine  30 Units Subcutaneous KRISTA Hauser MD      insulin lispro  1-6 Units Subcutaneous TID AC Saarh Sanchez PA-C      insulin lispro  1-6 Units Subcutaneous HS Sarah Sanchez PA-C      ondansetron  4 mg Intravenous Q6H PRN William Finley MD      oxyCODONE  5 mg Oral Q6H PRN Wliliam Finley MD      sodium bicarbonate  1,300 mg Oral 4x Daily HOWARD Puente            Today, Patient Was Seen By: Dejon Bennett MD    ** Please Note: Dictation voice to text software may have been used in the creation of this document   **

## 2021-03-22 NOTE — ASSESSMENT & PLAN NOTE
Patient has coronary disease and she status post stent placements in 2018  She denies any chest pain or shortness of breath currently  I am holding aspirin and Plavix before possible renal biopsy      Continue atorvastatin

## 2021-03-22 NOTE — ASSESSMENT & PLAN NOTE
Wt Readings from Last 3 Encounters:   03/22/21 120 kg (264 lb 1 6 oz)   02/21/20 117 kg (258 lb)   01/07/20 117 kg (258 lb 12 8 oz)     Patient admitted with increased lower extremity edema due to acute systolic CHF due to noncompliance with medications  Ejection fraction was 45-50% on this admission  Patient was treated with IV Bumex then IV diuretics were held because of worsening renal function    Acute systolic CHF resolved lungs are clear to auscultation, oxygen saturation is 90-92% on room air    Patient still has lower extremity edema    I discussed the case with patient's daughter at the bedside in detail March 22nd

## 2021-03-22 NOTE — PROGRESS NOTES
Progress Note - Podiatry  Salem No 60 y o  female MRN: [id number]  Unit/Bed#: -01 Encounter: [id number]    Assessment/Plan:    1  Skin ulceration left heel partial depth with DM2              -dressing change done earlier today by nursing  2  Cellulitis bilateral legs              -secondary to #3                -diminishing erythema and edema bilateral resolving satisfactorily  3  Venous hypertension with ulceration and inflammation bilateral legs   -nursing advised to encourage elevation of extremities and to avoid dependence at all times  -multiple wounds bilateral legs  No strike through drainage noted              -continue with Xeroform, ABDs, Kerlix, and six-inch Ace wraps QOD   -dressing changed by nursing  4  Noncompliance with medication, anemia, ADI, essential hypertension, morbid obesity, DM2              -managed per Internal Medicine and Nephrology      Subjective/Objective   Chief Complaint:   Chief Complaint   Patient presents with    Foot Pain     pt c/o left heel pain  pt has had pain for few days  pt also has ulcers on top of left foot  Subjective:  44-year-old white female resting comfortably in bedside recliner with legs elevated  Relates no new or significant pain over the past 24 hours from her legs and left foot  Relates legs feel much better overall than upon admission  Denies any fever shortness of breath  Blood pressure 156/66, pulse 62, temperature 98 1 °F (36 7 °C), temperature source Oral, resp  rate 18, height 5' 4" (1 626 m), weight 120 kg (264 lb 1 6 oz), SpO2 90 %, not currently breastfeeding  ,Body mass index is 45 33 kg/m²      Invasive Devices     Peripheral Intravenous Line            Peripheral IV 03/22/21 Right Hand less than 1 day                Physical Exam:   General appearance: alert, cooperative and no distress  Neuro/Vascular: Normal strength  Sensation and reflexes:  Diminished epicritic sensation  Pulses: Right: DP 0/4, PT 0/4, Left: DP 0/4, PT 0/4  Capillary Filling:  3 Sec, Edema:  + 2 - +3 edema bilateral  Extremity: Ulcers/Wounds:   · Left heel:  3 0 x 3 0 x 0 1 cm partial depth ulcerative breakdown posterior lateral aspect, minimal serosanguineous drainage, evidence of old DD removed blister, 50% yellow, 50% pink/red, no evidence of acute infection  Pain with palpation  · Bilateral legs:  Multiple partial depth venous hypertension ulcerations with moderate serosanguineous drainage  Erythema appears be diminishing with less calor noted  Wounds granulating satisfactorily  Overall legs are improving  Edema has also been diminishing over the past 24 hours            Labs and other studies:   CBC w/diff  Results from last 7 days   Lab Units 03/22/21  0417   WBC Thousand/uL 9 79   HEMOGLOBIN g/dL 7 3*   HEMATOCRIT % 24 4*   PLATELETS Thousands/uL 197   NEUTROS PCT % 68   LYMPHS PCT % 9*   MONOS PCT % 8   EOS PCT % 15*     BMP  Results from last 7 days   Lab Units 03/22/21  0417   POTASSIUM mmol/L 4 4   CHLORIDE mmol/L 112*   CO2 mmol/L 18*   BUN mg/dL 75*   CREATININE mg/dL 4 50*   CALCIUM mg/dL 7 9*     CMP  Results from last 7 days   Lab Units 03/22/21  0417  03/20/21  0537   POTASSIUM mmol/L 4 4   < > 4 3   CHLORIDE mmol/L 112*   < > 113*   CO2 mmol/L 18*   < > 18*   BUN mg/dL 75*   < > 71*   CREATININE mg/dL 4 50*   < > 3 93*   CALCIUM mg/dL 7 9*   < > 7 6*   ALK PHOS U/L  --   --  89   ALT U/L  --   --  13   AST U/L  --   --  15    < > = values in this interval not displayed  @Culture@  Lab Results   Component Value Date    BLOODCX No Growth After 5 Days  03/15/2021    BLOODCX No Growth After 5 Days   03/15/2021    URINECX <10,000 cfu/ml  03/16/2021     No results found for: WOUNDCULT      Current Facility-Administered Medications:     acetaminophen (TYLENOL) tablet 650 mg, 650 mg, Oral, Q6H PRN, Sarah Sanchez PA-C, 650 mg at 03/17/21 2320    amLODIPine (NORVASC) tablet 2 5 mg, 2 5 mg, Oral, Daily, HOWARD Hopper, 2 5 mg at 03/22/21 1127    aspirin (ECOTRIN LOW STRENGTH) EC tablet 81 mg, 81 mg, Oral, Daily, Sarah Sanchez PA-C, 81 mg at 03/22/21 0826    atorvastatin (LIPITOR) tablet 40 mg, 40 mg, Oral, Daily, Sarah Sanchez PA-C, 40 mg at 03/22/21 8489    calcium acetate (PHOSLO) capsule 667 mg, 667 mg, Oral, TID With Meals, HOWARD Roman, 667 mg at 03/22/21 1127    carvedilol (COREG) tablet 25 mg, 25 mg, Oral, BID, HOWARD Thomas    clopidogrel (PLAVIX) tablet 75 mg, 75 mg, Oral, Daily, Sarah Sanchez PA-C, 75 mg at 03/22/21 0826    dextrose 5 % bolus 250 mL, 250 mL, Intravenous, Once, HOWARD Thomas, Last Rate: 62 5 mL/hr at 03/22/21 1131, 250 mL at 03/22/21 1131    diphenhydrAMINE (BENADRYL) tablet 25 mg, 25 mg, Oral, Q6H PRN, Martinez Berry PA-C, 25 mg at 03/17/21 0545    ferrous sulfate tablet 325 mg, 325 mg, Oral, Daily With Breakfast, Fabricio Flor MD, 325 mg at 03/22/21 0827    heparin (porcine) subcutaneous injection 5,000 Units, 5,000 Units, Subcutaneous, Q8H [address], 5,000 Units at 03/22/21 0517 **AND** [COMPLETED] Platelet count, , , Once, Martinez Berry PA-C    hydrALAZINE (APRESOLINE) tablet 75 mg, 75 mg, Oral, BID, HOWARD Thomas    insulin glargine (LANTUS) subcutaneous injection 30 Units 0 3 mL, 30 Units, Subcutaneous, QAM, Thea Box MD, 30 Units at 03/22/21 0826    insulin lispro (HumaLOG) 100 units/mL subcutaneous injection 1-6 Units, 1-6 Units, Subcutaneous, TID AC, Stopped at 03/19/21 1725 **AND** Fingerstick Glucose (POCT), , , TID AC, Sarah Sanchez PA-C    insulin lispro (HumaLOG) 100 units/mL subcutaneous injection 1-6 Units, 1-6 Units, Subcutaneous, HS, Sarah Sanchez PA-C, 1 Units at 03/21/21 2124    ondansetron (ZOFRAN) injection 4 mg, 4 mg, Intravenous, Q6H PRN, Thea Box MD, 4 mg at 03/19/21 1350    oxyCODONE (ROXICODONE) IR tablet 5 mg, 5 mg, Oral, Q6H PRN, Thea Box MD    sodium bicarbonate tablet 1,300 mg, 1,300 mg, Oral, 4x Daily, HOWARD Puente, 1,300 mg at 03/22/21 1128    Imaging: I have personally reviewed pertinent films in PACS  EKG, Pathology, and Other Studies: I have personally reviewed pertinent reports    VTE Pharmacologic Prophylaxis: Heparin  VTE Mechanical Prophylaxis: reason for no mechanical VTE prophylaxis Bilateral leg wounds    Rhiannon Hinds, JAMIE

## 2021-03-22 NOTE — ASSESSMENT & PLAN NOTE
Lab Results   Component Value Date    HGBA1C 7 6 (H) 03/15/2021       Recent Labs     03/21/21  1544 03/21/21  2117 03/22/21  0744 03/22/21  1125   POCGLU 89 163* 98 129       Blood Sugar Average: Last 72 hrs:  · (P) 106 25     Patient has type 2 diabetes on insulin with stage IV chronic disease      Her blood sugars are controlled    Continue Lantus and pre meal Humalog

## 2021-03-22 NOTE — ASSESSMENT & PLAN NOTE
Lab Results   Component Value Date    EGFR 10 03/22/2021    EGFR 10 03/21/2021    EGFR 11 03/20/2021    CREATININE 4 50 (H) 03/22/2021    CREATININE 4 37 (H) 03/21/2021    CREATININE 3 93 (H) 03/20/2021   ·   Patient has stage IV chronic disease with baseline creatinine between 1 49-2  15  She presented with acute kidney injury  She has nephrotic range proteinuria    Nephrology was consulted  Her renal function is worsening  Nephrology plans to discuss kidney biopsy  D/w dr Geraldine Reyes

## 2021-03-22 NOTE — CONSULTS
Vancomycin therapy was discontinued by attending team  Pharmacy is signing off the consult   Thank you for the consult

## 2021-03-22 NOTE — PROGRESS NOTES
NEPHROLOGY PROGRESS NOTE   Brennan Schmidt 60 y o  female MRN: [id number]  Unit/Bed#: -01 Encounter: [id number]  Reason for Consult: ADI (POA)    PLAN:  -creatinine unfortunately continues to rise  Reports diarrheal stools Friday into Saturday, at least 10 BM's  ? Rise acute rise in creatinine    -complements, hepatitis panel, BHUMI negative  -recheck bladder scan and UA  Check urine lytes  -collect UPEP  SPEP pending    -may need renal biopsy this week, on ASA and plavix  -hypotension resolved, antihypertensives resumed  Holding parameters adjusted  -stable acidosis, continues on sodium bicarbonate 1300 mg 4 times per day   -bilateral lower extremity edema, Doppler negative for DVT  -CHF with EF of 45-50%, last dose of diuretics 3/17   -lower extremity cellulitis, completed antibiotics  -hyperphosphatemia, on phosphorus binder  Continue to monitor and trend  -hypernatremia, sodium level increasing to 146  Will give small bolus of D5W today  ASSESSMENT/PLAN:  Acute kidney injury (POA) on CKD stage 4, versus underlying progression of chronic disease:  Suspected prerenal secondary to volume depletion in the setting of diarrhea and hypotension as well as ATN with cellulitis  -presents with creatinine of 3 23   -creatinine increased to 3 93 with rise to 4 37 on 03/21 and steady increase to 4 5 today    -baseline creatinine previously low 2s in 2020    -diuretics currently held, last dose on 03/17   -received IV albumin for hypotension  -will recheck bladder scan PVR with urinary retention protocol    -urine eosinophils 0   -UCPR: 4 57 g  -BHUMI, hepatitis panel, complements normal     -UA:  3+ protein, no rbc's, fine granular cast   -imaging unremarkable for hydronephrosis  -SPEP/UPEP pending   -avoid nephrotoxins, hypotension, IV contrast     Proteinuria:  -previous UPCR 3 g in 2018   -recent UPCR 4 57    -will continue to monitor  -SPEP/UPEP pending    -not on Ace/Arb      Metabolic acidosis: in the setting of acute renal failure as well as diarrhea   -continues on oral bicarbonate tablets  -CO2 level stable at 18  Hypotension: (resolved) blood pressure now stable between 140s in 160s  -received IV albumin administration   -avoid hypotension or high fluctuations in blood pressure   -outpatient medications:  Coreg 25 mg 2 times per day, hydralazine 75 mg 2 times per day, amlodipine 2 5 mg daily, torsemide 40 mg daily   -continues on Coreg 25 mg b i d , hydralazine 25 mg 2 times per day  -will resume amlodipine 2 5 mg daily  -holding parameters adjusted on antihypertensive for systolic blood pressure less than 130  Diastolic heart failure:  Presenting with shortness of breath and lower extremity edema  Reports stop taking torsemide approximately 1 week ago  Most recent echo showed EF of 45-50% with grade 2 diastolic dysfunction, mild-to-moderate AS, dilated IVC  -chest x-ray negative for acute cardiopulmonary disease   -continue daily weights, I/O, fluid restriction   -documented weight increased since admission 3 lbs  -outpatient diuretic:  Torsemide 40 mg daily  Bilateral lower extremity edema:  -lower extremity Doppler negative for DVT  -continue to elevate legs throughout the day  -recommend low-salt diet  Anemia: Hgb stable in the low 7's    -continue to monitor and transfuse as needed for hemoglobin less than 7 0   -iron panel showed iron sat 14%   -avoiding IV iron due to infection   -continue oral iron supplements  Lower extremity cellulitis:  -completed antibiotics  MBD in CKD:  -hyperphosphatemia, continues on increased dose of phosphorus binder  Last phos level 5 4  Will continue to monitor, check phosphorus level again on Wednesday   -Mg 2 1    Hypernatremia:   -Na level increasing to 146  Other:  Diabetes, obesity    Disposition:  Requiring additional stay due to medical needs  SUBJECTIVE:  The patient is resting in her bedside chair    She denies any chest pain or shortness of breath  She denies nausea, vomiting  She reports having at least 10 diarrheal stools from Friday night into Saturday  She denies any further diarrhea  She feels as if she is urinating less than usual   She states that she has a poor overall appetite      OBJECTIVE:  Current Weight: Weight - Scale: 120 kg (264 lb 1 6 oz)  Vitals:    03/21/21 1423 03/21/21 2119 03/22/21 0415 03/22/21 0759   BP: 160/66 158/65  162/67   BP Location:    Left arm   Pulse: 66 67  68   Resp: 16 18  18   Temp: (!) 97 3 °F (36 3 °C) 98 6 °F (37 °C)  98 1 °F (36 7 °C)   TempSrc:    Oral   SpO2: 94% (!) 89%  92%   Weight:   120 kg (264 lb 1 6 oz)    Height:           Intake/Output Summary (Last 24 hours) at 3/22/2021 0910  Last data filed at 3/22/2021 6755  Gross per 24 hour   Intake 480 ml   Output 1250 ml   Net -770 ml     General: NAD  Skin: warm, dry, intact, no rash  HEENT: Moist mucous membranes, sclera anicteric, normocephalic, atraumatic  Neck: No apparent JVD appreciated  Chest: lung sounds clear B/L, on RA   CVS:Regular rate and rhythm, no murmer   Abdomen: Soft, round, non-tender, +BS, obese  Extremities: B/L LE edema present  Neuro: alert and oriented  Psych: appropriate mood and affect     Medications:    Current Facility-Administered Medications:     acetaminophen (TYLENOL) tablet 650 mg, 650 mg, Oral, Q6H PRN, Sarah Sanchez PA-C, 650 mg at 03/17/21 2320    aspirin (ECOTRIN LOW STRENGTH) EC tablet 81 mg, 81 mg, Oral, Daily, Sarah Sanchez PA-C, 81 mg at 03/22/21 0826    atorvastatin (LIPITOR) tablet 40 mg, 40 mg, Oral, Daily, Sarah Sanchez PA-C, 40 mg at 03/22/21 6262    calcium acetate (PHOSLO) capsule 667 mg, 667 mg, Oral, TID With Meals, Lisa Shells, CRNP, 667 mg at 03/22/21 0826    carvedilol (COREG) tablet 25 mg, 25 mg, Oral, BID, Sarah Sanchez PA-C, 25 mg at 03/22/21 6216    clopidogrel (PLAVIX) tablet 75 mg, 75 mg, Oral, Daily, Sarah Sanchez PA-C, 75 mg at 03/22/21 0826    diphenhydrAMINE (BENADRYL) tablet 25 mg, 25 mg, Oral, Q6H PRN, Brianna Collado PA-C, 25 mg at 03/17/21 0545    ferrous sulfate tablet 325 mg, 325 mg, Oral, Daily With Breakfast, Tiffanie Burns MD, 325 mg at 03/22/21 0827    heparin (porcine) subcutaneous injection 5,000 Units, 5,000 Units, Subcutaneous, Q8H [address], 5,000 Units at 03/22/21 0517 **AND** [COMPLETED] Platelet count, , , Once, Brianna Collado PA-C    hydrALAZINE (APRESOLINE) tablet 75 mg, 75 mg, Oral, BID, Sarah Sanchez PA-C, 75 mg at 03/22/21 0826    insulin glargine (LANTUS) subcutaneous injection 30 Units 0 3 mL, 30 Units, Subcutaneous, QAM, Viri Calhoun MD, 30 Units at 03/22/21 0826    insulin lispro (HumaLOG) 100 units/mL subcutaneous injection 1-6 Units, 1-6 Units, Subcutaneous, TID AC, Stopped at 03/19/21 1725 **AND** Fingerstick Glucose (POCT), , , TID AC, Sarah Sanchez PA-C    insulin lispro (HumaLOG) 100 units/mL subcutaneous injection 1-6 Units, 1-6 Units, Subcutaneous, HS, Sarah Sanchez PA-C, 1 Units at 03/21/21 2124    ondansetron (ZOFRAN) injection 4 mg, 4 mg, Intravenous, Q6H PRN, Viri Calhoun MD, 4 mg at 03/19/21 1350    oxyCODONE (ROXICODONE) IR tablet 5 mg, 5 mg, Oral, Q6H PRN, Viri Calhoun MD    sodium bicarbonate tablet 1,300 mg, 1,300 mg, Oral, 4x Daily, HOWARD Puente, 1,300 mg at 03/22/21 8704    Laboratory Results:  Results from last 7 days   Lab Units 03/22/21  0417 03/21/21  0522 03/20/21  0537 03/19/21  0513 03/18/21  0530  03/17/21  0421   WBC Thousand/uL 9 79 9 57 10 21* 11 57*  --    < > 7 61   HEMOGLOBIN g/dL 7 3* 7 0* 7 9* 7 6*  --    < > 7 1*   HEMATOCRIT % 24 4* 24 4* 27 0* 25 9*  --    < > 24 0*   PLATELETS Thousands/uL 197 209 240 219  --    < > 187   SODIUM mmol/L 146* 143 146* 145 147*  --  145   POTASSIUM mmol/L 4 4 4 3 4 3 4 5 4 4  --  4 7   CHLORIDE mmol/L 112* 113* 113* 114* 115*  --  115*   CO2 mmol/L 18* 18* 18* 18* 16*  --  17*   BUN mg/dL 75* 72* 71* 72* 75*  --  74*   CREATININE mg/dL 4 50* 4 37* 3 93* 3 67* 3 76*  --  3 57* CALCIUM mg/dL 7 9* 7 4* 7 6* 7 5* 7 4*  --  7 6*   MAGNESIUM mg/dL 2 1  --   --   --  1 9  --  1 8   PHOSPHORUS mg/dL  --   --  5 4* 4 5* 5 6*  --  6 1*   ALK PHOS U/L  --   --  89 78 82  --   --    ALT U/L  --   --  13 12 14  --   --    AST U/L  --   --  15 18 12  --   --     < > = values in this interval not displayed

## 2021-03-22 NOTE — ASSESSMENT & PLAN NOTE
Patient has normocytic anemia  Iron studies revealed anemia chronic disease likely due to chronic disease  Patient denies any signs of bleeding    Will monitor hemoglobin closely

## 2021-03-22 NOTE — ASSESSMENT & PLAN NOTE
Patient admitted with bilateral lower extremity cellulitis and was treated with IV cefepime and vancomycin  cellulitis resolved

## 2021-03-23 ENCOUNTER — TRANSCRIBE ORDERS (OUTPATIENT)
Dept: LAB | Facility: HOSPITAL | Age: 66
End: 2021-03-23

## 2021-03-23 DIAGNOSIS — Z79.4 TYPE 2 DIABETES MELLITUS WITH STAGE 4 CHRONIC KIDNEY DISEASE, WITH LONG-TERM CURRENT USE OF INSULIN (HCC): Primary | ICD-10-CM

## 2021-03-23 DIAGNOSIS — L03.119 CELLULITIS OF LOWER EXTREMITY, UNSPECIFIED LATERALITY: ICD-10-CM

## 2021-03-23 DIAGNOSIS — L97.522 ULCER OF LEFT FOOT WITH FAT LAYER EXPOSED (HCC): ICD-10-CM

## 2021-03-23 DIAGNOSIS — E87.5 HYPERKALEMIA: ICD-10-CM

## 2021-03-23 DIAGNOSIS — N18.9 ACUTE KIDNEY INJURY SUPERIMPOSED ON CHRONIC KIDNEY DISEASE (HCC): ICD-10-CM

## 2021-03-23 DIAGNOSIS — E87.2 INCREASED ANION GAP METABOLIC ACIDOSIS: ICD-10-CM

## 2021-03-23 DIAGNOSIS — E83.39 HYPERPHOSPHATEMIA: ICD-10-CM

## 2021-03-23 DIAGNOSIS — I67.1 CEREBRAL ANEURYSM WITHOUT RUPTURE: ICD-10-CM

## 2021-03-23 DIAGNOSIS — E11.22 TYPE 2 DIABETES MELLITUS WITH STAGE 4 CHRONIC KIDNEY DISEASE, WITH LONG-TERM CURRENT USE OF INSULIN (HCC): Primary | ICD-10-CM

## 2021-03-23 DIAGNOSIS — D63.1 ANEMIA DUE TO STAGE 4 CHRONIC KIDNEY DISEASE (HCC): ICD-10-CM

## 2021-03-23 DIAGNOSIS — R55 SYNCOPE AND COLLAPSE: ICD-10-CM

## 2021-03-23 DIAGNOSIS — I50.43 ACUTE ON CHRONIC COMBINED SYSTOLIC AND DIASTOLIC CONGESTIVE HEART FAILURE (HCC): ICD-10-CM

## 2021-03-23 DIAGNOSIS — I87.312 IDIOPATHIC CHRONIC VENOUS HYPERTENSION OF LEFT LOWER EXTREMITY WITH ULCER (HCC): ICD-10-CM

## 2021-03-23 DIAGNOSIS — Z91.14 NON COMPLIANCE W MEDICATION REGIMEN: ICD-10-CM

## 2021-03-23 DIAGNOSIS — E87.2 ACIDOSIS: ICD-10-CM

## 2021-03-23 DIAGNOSIS — L03.116 CELLULITIS OF BOTH LOWER EXTREMITIES: ICD-10-CM

## 2021-03-23 DIAGNOSIS — N18.4 ANEMIA DUE TO STAGE 4 CHRONIC KIDNEY DISEASE (HCC): ICD-10-CM

## 2021-03-23 DIAGNOSIS — L03.115 CELLULITIS OF BOTH LOWER EXTREMITIES: ICD-10-CM

## 2021-03-23 DIAGNOSIS — N28.9 KIDNEY DISEASE: ICD-10-CM

## 2021-03-23 DIAGNOSIS — N18.4 STAGE 4 CHRONIC KIDNEY DISEASE (HCC): ICD-10-CM

## 2021-03-23 DIAGNOSIS — L97.929 IDIOPATHIC CHRONIC VENOUS HYPERTENSION OF LEFT LOWER EXTREMITY WITH ULCER (HCC): ICD-10-CM

## 2021-03-23 DIAGNOSIS — E66.01 CLASS 3 SEVERE OBESITY WITHOUT SERIOUS COMORBIDITY WITH BODY MASS INDEX (BMI) OF 45.0 TO 49.9 IN ADULT, UNSPECIFIED OBESITY TYPE (HCC): ICD-10-CM

## 2021-03-23 DIAGNOSIS — N17.9 ACUTE KIDNEY INJURY SUPERIMPOSED ON CHRONIC KIDNEY DISEASE (HCC): ICD-10-CM

## 2021-03-23 DIAGNOSIS — N18.4 TYPE 2 DIABETES MELLITUS WITH STAGE 4 CHRONIC KIDNEY DISEASE, WITH LONG-TERM CURRENT USE OF INSULIN (HCC): Primary | ICD-10-CM

## 2021-03-23 DIAGNOSIS — I25.10 CORONARY ARTERY DISEASE INVOLVING NATIVE CORONARY ARTERY OF NATIVE HEART WITHOUT ANGINA PECTORIS: ICD-10-CM

## 2021-03-23 DIAGNOSIS — I10 ESSENTIAL HYPERTENSION: ICD-10-CM

## 2021-03-23 LAB
ALBUMIN SERPL BCP-MCNC: 2.7 G/DL (ref 3.5–5)
ALP SERPL-CCNC: 94 U/L (ref 46–116)
ALT SERPL W P-5'-P-CCNC: 26 U/L (ref 12–78)
ANION GAP SERPL CALCULATED.3IONS-SCNC: 14 MMOL/L (ref 4–13)
ASO AB TITR SER LA: NORMAL {TITER}
AST SERPL W P-5'-P-CCNC: 22 U/L (ref 5–45)
BILIRUB SERPL-MCNC: 0.4 MG/DL (ref 0.2–1)
BUN SERPL-MCNC: 73 MG/DL (ref 5–25)
CALCIUM ALBUM COR SERPL-MCNC: 8.9 MG/DL (ref 8.3–10.1)
CALCIUM SERPL-MCNC: 7.9 MG/DL (ref 8.3–10.1)
CHLORIDE SERPL-SCNC: 110 MMOL/L (ref 100–108)
CHLORIDE UR-SCNC: 27 MMOL/L
CO2 SERPL-SCNC: 21 MMOL/L (ref 21–32)
CREAT SERPL-MCNC: 4.24 MG/DL (ref 0.6–1.3)
CREAT UR-MCNC: 108 MG/DL
DSDNA AB SER-ACNC: 1 IU/ML (ref 0–9)
ERYTHROCYTE [DISTWIDTH] IN BLOOD BY AUTOMATED COUNT: 15.9 % (ref 11.6–15.1)
GFR SERPL CREATININE-BSD FRML MDRD: 10 ML/MIN/1.73SQ M
GLUCOSE SERPL-MCNC: 101 MG/DL (ref 65–140)
GLUCOSE SERPL-MCNC: 104 MG/DL (ref 65–140)
GLUCOSE SERPL-MCNC: 204 MG/DL (ref 65–140)
GLUCOSE SERPL-MCNC: 221 MG/DL (ref 65–140)
GLUCOSE SERPL-MCNC: 31 MG/DL (ref 65–140)
GLUCOSE SERPL-MCNC: 73 MG/DL (ref 65–140)
GLUCOSE SERPL-MCNC: 76 MG/DL (ref 65–140)
GLUCOSE SERPL-MCNC: 79 MG/DL (ref 65–140)
GLUCOSE SERPL-MCNC: 98 MG/DL (ref 65–140)
HCT VFR BLD AUTO: 24.1 % (ref 34.8–46.1)
HGB BLD-MCNC: 7.3 G/DL (ref 11.5–15.4)
KAPPA LC FREE SER-MCNC: 157.9 MG/L (ref 3.3–19.4)
KAPPA LC FREE/LAMBDA FREE SER: 2.02 {RATIO} (ref 0.26–1.65)
LAMBDA LC FREE SERPL-MCNC: 78.1 MG/L (ref 5.7–26.3)
MCH RBC QN AUTO: 28.2 PG (ref 26.8–34.3)
MCHC RBC AUTO-ENTMCNC: 30.3 G/DL (ref 31.4–37.4)
MCV RBC AUTO: 93 FL (ref 82–98)
PHOSPHATE SERPL-MCNC: 3.9 MG/DL (ref 2.3–4.1)
PLATELET # BLD AUTO: 207 THOUSANDS/UL (ref 149–390)
PMV BLD AUTO: 11.2 FL (ref 8.9–12.7)
POTASSIUM SERPL-SCNC: 4.3 MMOL/L (ref 3.5–5.3)
PROT SERPL-MCNC: 6.3 G/DL (ref 6.4–8.2)
RBC # BLD AUTO: 2.59 MILLION/UL (ref 3.81–5.12)
RHEUMATOID FACT SER QL LA: NEGATIVE
RPR SER QL: NORMAL
SODIUM 24H UR-SCNC: 35 MOL/L
SODIUM SERPL-SCNC: 145 MMOL/L (ref 136–145)
WBC # BLD AUTO: 9.84 THOUSAND/UL (ref 4.31–10.16)

## 2021-03-23 PROCEDURE — 99232 SBSQ HOSP IP/OBS MODERATE 35: CPT | Performed by: INTERNAL MEDICINE

## 2021-03-23 PROCEDURE — 82947 ASSAY GLUCOSE BLOOD QUANT: CPT | Performed by: INTERNAL MEDICINE

## 2021-03-23 PROCEDURE — 82948 REAGENT STRIP/BLOOD GLUCOSE: CPT

## 2021-03-23 PROCEDURE — 83516 IMMUNOASSAY NONANTIBODY: CPT

## 2021-03-23 PROCEDURE — 84100 ASSAY OF PHOSPHORUS: CPT | Performed by: INTERNAL MEDICINE

## 2021-03-23 PROCEDURE — 85027 COMPLETE CBC AUTOMATED: CPT | Performed by: INTERNAL MEDICINE

## 2021-03-23 PROCEDURE — 80053 COMPREHEN METABOLIC PANEL: CPT | Performed by: INTERNAL MEDICINE

## 2021-03-23 RX ORDER — DEXTROSE MONOHYDRATE 25 G/50ML
25 INJECTION, SOLUTION INTRAVENOUS ONCE
Status: DISCONTINUED | OUTPATIENT
Start: 2021-03-23 | End: 2021-04-05 | Stop reason: HOSPADM

## 2021-03-23 RX ORDER — INSULIN GLARGINE 100 [IU]/ML
25 INJECTION, SOLUTION SUBCUTANEOUS EVERY MORNING
Status: DISCONTINUED | OUTPATIENT
Start: 2021-03-24 | End: 2021-04-05 | Stop reason: HOSPADM

## 2021-03-23 RX ORDER — AMLODIPINE BESYLATE 5 MG/1
5 TABLET ORAL DAILY
Status: DISCONTINUED | OUTPATIENT
Start: 2021-03-24 | End: 2021-03-30

## 2021-03-23 RX ORDER — ALBUMIN (HUMAN) 12.5 G/50ML
12.5 SOLUTION INTRAVENOUS 2 TIMES DAILY
Status: DISCONTINUED | OUTPATIENT
Start: 2021-03-23 | End: 2021-03-26

## 2021-03-23 RX ORDER — DEXTROSE MONOHYDRATE 25 G/50ML
INJECTION, SOLUTION INTRAVENOUS
Status: COMPLETED
Start: 2021-03-23 | End: 2021-03-23

## 2021-03-23 RX ADMIN — CALCIUM ACETATE 667 MG: 667 CAPSULE ORAL at 16:33

## 2021-03-23 RX ADMIN — CALCIUM ACETATE 667 MG: 667 CAPSULE ORAL at 07:40

## 2021-03-23 RX ADMIN — ATORVASTATIN CALCIUM 40 MG: 40 TABLET, FILM COATED ORAL at 09:43

## 2021-03-23 RX ADMIN — HEPARIN SODIUM 5000 UNITS: 5000 INJECTION INTRAVENOUS; SUBCUTANEOUS at 22:05

## 2021-03-23 RX ADMIN — ALBUMIN (HUMAN) 12.5 G: 0.25 INJECTION, SOLUTION INTRAVENOUS at 10:39

## 2021-03-23 RX ADMIN — HEPARIN SODIUM 5000 UNITS: 5000 INJECTION INTRAVENOUS; SUBCUTANEOUS at 14:04

## 2021-03-23 RX ADMIN — HYDRALAZINE HYDROCHLORIDE 75 MG: 25 TABLET, FILM COATED ORAL at 09:43

## 2021-03-23 RX ADMIN — ALBUMIN (HUMAN) 12.5 G: 0.25 INJECTION, SOLUTION INTRAVENOUS at 21:00

## 2021-03-23 RX ADMIN — CALCIUM ACETATE 667 MG: 667 CAPSULE ORAL at 12:52

## 2021-03-23 RX ADMIN — HYDRALAZINE HYDROCHLORIDE 75 MG: 25 TABLET, FILM COATED ORAL at 21:00

## 2021-03-23 RX ADMIN — AMLODIPINE BESYLATE 2.5 MG: 2.5 TABLET ORAL at 09:43

## 2021-03-23 RX ADMIN — FERROUS SULFATE TAB 325 MG (65 MG ELEMENTAL FE) 325 MG: 325 (65 FE) TAB at 07:40

## 2021-03-23 RX ADMIN — SODIUM BICARBONATE 650 MG TABLET 1300 MG: at 12:52

## 2021-03-23 RX ADMIN — SODIUM BICARBONATE 650 MG TABLET 1300 MG: at 18:52

## 2021-03-23 RX ADMIN — CARVEDILOL 25 MG: 12.5 TABLET, FILM COATED ORAL at 09:43

## 2021-03-23 RX ADMIN — INSULIN GLARGINE 30 UNITS: 100 INJECTION, SOLUTION SUBCUTANEOUS at 09:53

## 2021-03-23 RX ADMIN — SODIUM BICARBONATE 650 MG TABLET 1300 MG: at 22:05

## 2021-03-23 RX ADMIN — HEPARIN SODIUM 5000 UNITS: 5000 INJECTION INTRAVENOUS; SUBCUTANEOUS at 06:18

## 2021-03-23 RX ADMIN — CARVEDILOL 25 MG: 12.5 TABLET, FILM COATED ORAL at 18:52

## 2021-03-23 RX ADMIN — INSULIN LISPRO 2 UNITS: 100 INJECTION, SOLUTION INTRAVENOUS; SUBCUTANEOUS at 22:00

## 2021-03-23 RX ADMIN — SODIUM BICARBONATE 650 MG TABLET 1300 MG: at 09:43

## 2021-03-23 RX ADMIN — SODIUM CHLORIDE 200 MG: 9 INJECTION, SOLUTION INTRAVENOUS at 16:00

## 2021-03-23 NOTE — PROGRESS NOTES
Progress Note - Podiatry  Janette Fernando 60 y o  female MRN: [id number]  Unit/Bed#: -01 Encounter: [id number]    Assessment/Plan:    1  Skin ulceration left heel partial depth with DM2   - Continue with Maxorb Ag every other day  -dressing change done earlier today by nursing   - Patient should follow-up at [address] [facility]  upon discharge  2   Cellulitis bilateral legs              -secondary to #3                -diminishing erythema and edema bilateral resolving satisfactorily  3  Venous hypertension with ulceration and inflammation bilateral legs   -nursing advised to encourage elevation of extremities and to avoid dependence at all times  -multiple wounds bilateral legs  No strike through drainage noted              -continue with Xeroform, ABDs, Kerlix, and six-inch Ace wraps QOD   -dressing changes by nursing staff  4  Noncompliance with medication, anemia, ADI, essential hypertension, morbid obesity, DM2              -managed per Internal Medicine and Nephrology      Subjective/Objective   Chief Complaint:   Chief Complaint   Patient presents with    Foot Pain     pt c/o left heel pain  pt has had pain for few days  pt also has ulcers on top of left foot  Subjective:  61-year-old white female resting comfortably in bedside sleeping with legs dependent  Relates no new or significant pain over the past 24 hours from her legs and left foot  Relates legs feel much better overall than upon admission  Denies any fever shortness of breath  Blood pressure 166/62, pulse 66, temperature 97 5 °F (36 4 °C), resp  rate 18, height 5' 4" (1 626 m), weight 120 kg (264 lb 12 8 oz), SpO2 90 %, not currently breastfeeding  ,Body mass index is 45 45 kg/m²      Invasive Devices     Peripheral Intravenous Line            Peripheral IV 03/22/21 Right Hand 1 day                Physical Exam:   General appearance: alert, cooperative and no distress  Neuro/Vascular: Normal strength  Sensation and reflexes:  Diminished epicritic sensation  Pulses: Right: DP 0/4, PT 0/4, Left: DP 0/4, PT 0/4  Capillary Filling:  3 Sec, Edema:  + 2 - +3 edema bilateral  Extremity: Ulcers/Wounds:   · Left heel:  3 0 x 3 0 x 0 1 cm partial depth ulcerative breakdown posterior lateral aspect, minimal serosanguineous drainage, evidence of old DD removed blister, 50% yellow, 50% pink/red, no evidence of acute infection  Pain with palpation  · Bilateral legs:  Multiple partial depth venous hypertension ulcerations with diminishing serosanguineous drainage  Erythema appears be resolving with less calor  Wounds granulating well  Overall legs are improving  Edema has also reduced in both legs  Labs and other studies:   CBC w/diff  Results from last 7 days   Lab Units 03/23/21  0518 03/22/21  0417   WBC Thousand/uL 9 84 9 79   HEMOGLOBIN g/dL 7 3* 7 3*   HEMATOCRIT % 24 1* 24 4*   PLATELETS Thousands/uL 207 197   NEUTROS PCT %  --  68   LYMPHS PCT %  --  9*   MONOS PCT %  --  8   EOS PCT %  --  15*     BMP  Results from last 7 days   Lab Units 03/23/21  0518   POTASSIUM mmol/L 4 3   CHLORIDE mmol/L 110*   CO2 mmol/L 21   BUN mg/dL 73*   CREATININE mg/dL 4 24*   CALCIUM mg/dL 7 9*     CMP  Results from last 7 days   Lab Units 03/23/21  0518   POTASSIUM mmol/L 4 3   CHLORIDE mmol/L 110*   CO2 mmol/L 21   BUN mg/dL 73*   CREATININE mg/dL 4 24*   CALCIUM mg/dL 7 9*   ALK PHOS U/L 94   ALT U/L 26   AST U/L 22       @Culture@  Lab Results   Component Value Date    BLOODCX No Growth After 5 Days  03/15/2021    BLOODCX No Growth After 5 Days   03/15/2021    URINECX <10,000 cfu/ml  03/16/2021     No results found for: WOUNDCULT      Current Facility-Administered Medications:     acetaminophen (TYLENOL) tablet 650 mg, 650 mg, Oral, Q6H PRN, Sarah Sanchez PA-C, 650 mg at 03/17/21 2320    albumin human (FLEXBUMIN) 25 % injection 12 5 g, 12 5 g, Intravenous, BID, HOWARD Dickey, Last Rate: 2 mL/hr at 03/23/21 1039, 12 5 g at 03/23/21 1039    [START ON 3/24/2021] amLODIPine (NORVASC) tablet 5 mg, 5 mg, Oral, Daily, HOWARD Guerrero    atorvastatin (LIPITOR) tablet 40 mg, 40 mg, Oral, Daily, Sarah Sanchez PA-C, 40 mg at 03/23/21 4844    calcium acetate (PHOSLO) capsule 667 mg, 667 mg, Oral, TID With Meals, HOWARD Hernandez, 667 mg at 03/23/21 0740    carvedilol (COREG) tablet 25 mg, 25 mg, Oral, BID, HOWARD Guerrero, 25 mg at 03/23/21 0923    diphenhydrAMINE (BENADRYL) tablet 25 mg, 25 mg, Oral, Q6H PRN, Mikayla Mock PA-C, 25 mg at 03/17/21 0545    ferrous sulfate tablet 325 mg, 325 mg, Oral, Daily With Breakfast, Linda Lujan MD, 325 mg at 03/23/21 0740    heparin (porcine) subcutaneous injection 5,000 Units, 5,000 Units, Subcutaneous, Q8H [address], 5,000 Units at 03/23/21 0618 **AND** [COMPLETED] Platelet count, , , Once, Mikayla Mock PA-C    hydrALAZINE (APRESOLINE) tablet 75 mg, 75 mg, Oral, BID, HOWARD Guerrero, 75 mg at 03/23/21 0943    insulin glargine (LANTUS) subcutaneous injection 30 Units 0 3 mL, 30 Units, Subcutaneous, QAM, Allyn Tomlin MD, 30 Units at 03/23/21 0953    insulin lispro (HumaLOG) 100 units/mL subcutaneous injection 1-6 Units, 1-6 Units, Subcutaneous, TID AC, 1 Units at 03/22/21 1718 **AND** Fingerstick Glucose (POCT), , , TID AC, Sarah Sanchez PA-C    insulin lispro (HumaLOG) 100 units/mL subcutaneous injection 1-6 Units, 1-6 Units, Subcutaneous, HS, Sarah Sanchez PA-C, 1 Units at 03/21/21 2124    ondansetron (ZOFRAN) injection 4 mg, 4 mg, Intravenous, Q6H PRN, Allyn Tomlin MD, 4 mg at 03/19/21 1350    oxyCODONE (ROXICODONE) IR tablet 5 mg, 5 mg, Oral, Q6H PRN, Allyn Tomlin MD    sodium bicarbonate tablet 1,300 mg, 1,300 mg, Oral, 4x Daily, HOWARD Puente, 1,300 mg at 03/23/21 6499    Imaging: I have personally reviewed pertinent films in PACS  EKG, Pathology, and Other Studies: I have personally reviewed pertinent reports    VTE Pharmacologic Prophylaxis: Heparin  VTE Mechanical Prophylaxis: reason for no mechanical VTE prophylaxis Bilateral leg wounds    Jenifer Davis DPM

## 2021-03-23 NOTE — PLAN OF CARE
Problem: Potential for Falls  Goal: Patient will remain free of falls  Description: INTERVENTIONS:  - Assess patient frequently for physical needs  -  Identify cognitive and physical deficits and behaviors that affect risk of falls    -  [place] fall precautions as indicated by assessment   - Educate patient/family on patient safety including physical limitations  - Instruct patient to call for assistance with activity based on assessment  - Modify environment to reduce risk of injury  - Consider OT/PT consult to assist with strengthening/mobility  Outcome: Progressing     Problem: PAIN - ADULT  Goal: Verbalizes/displays adequate comfort level or baseline comfort level  Description: Interventions:  - Encourage patient to monitor pain and request assistance  - Assess pain using appropriate pain scale  - Administer analgesics based on type and severity of pain and evaluate response  - Implement non-pharmacological measures as appropriate and evaluate response  - Consider cultural and social influences on pain and pain management  - Notify physician/advanced practitioner if interventions unsuccessful or patient reports new pain  Outcome: Progressing     Problem: INFECTION - ADULT  Goal: Absence or prevention of progression during hospitalization  Description: INTERVENTIONS:  - Assess and monitor for signs and symptoms of infection  - Monitor lab/diagnostic results  - Monitor all insertion sites, i e  indwelling lines, tubes, and drains  - Monitor endotracheal if appropriate and nasal secretions for changes in amount and color  - [place] appropriate cooling/warming therapies per order  - Administer medications as ordered  - Instruct and encourage patient and family to use good hand hygiene technique  - Identify and instruct in appropriate isolation precautions for identified infection/condition  Outcome: Progressing  Goal: Absence of fever/infection during neutropenic period  Description: INTERVENTIONS:  - Monitor WBC    Outcome: Progressing     Problem: SAFETY ADULT  Goal: Patient will remain free of falls  Description: INTERVENTIONS:  - Assess patient frequently for physical needs  -  Identify cognitive and physical deficits and behaviors that affect risk of falls    -  [place] fall precautions as indicated by assessment   - Educate patient/family on patient safety including physical limitations  - Instruct patient to call for assistance with activity based on assessment  - Modify environment to reduce risk of injury  - Consider OT/PT consult to assist with strengthening/mobility  Outcome: Progressing  Goal: Maintain or return to baseline ADL function  Description: INTERVENTIONS:  -  Assess patient's ability to carry out ADLs; assess patient's baseline for ADL function and identify physical deficits which impact ability to perform ADLs (bathing, care of mouth/teeth, toileting, grooming, dressing, etc )  - Assess/evaluate cause of self-care deficits   - Assess range of motion  - Assess patient's mobility; develop plan if impaired  - Assess patient's need for assistive devices and provide as appropriate  - Encourage maximum independence but intervene and supervise when necessary  - Involve family in performance of ADLs  - Assess for home care needs following discharge   - Consider OT consult to assist with ADL evaluation and planning for discharge  - Provide patient education as appropriate  Outcome: Progressing  Goal: Maintain or return mobility status to optimal level  Description: INTERVENTIONS:  - Assess patient's baseline mobility status (ambulation, transfers, stairs, etc )    - Identify cognitive and physical deficits and behaviors that affect mobility  - Identify mobility aids required to assist with transfers and/or ambulation (gait belt, sit-to-stand, lift, walker, cane, etc )  - [place] fall precautions as indicated by assessment  - Record patient progress and toleration of activity level on Mobility SBAR; progress patient to next Phase/Stage  - Instruct patient to call for assistance with activity based on assessment  - Consider rehabilitation consult to assist with strengthening/weightbearing, etc   Outcome: Progressing     Problem: DISCHARGE PLANNING  Goal: Discharge to home or other facility with appropriate resources  Description: INTERVENTIONS:  - Identify barriers to discharge w/patient and caregiver  - Arrange for needed discharge resources and transportation as appropriate  - Identify discharge learning needs (meds, wound care, etc )  - Arrange for interpretive services to assist at discharge as needed  - Refer to Case Management Department for coordinating discharge planning if the patient needs post-hospital services based on physician/advanced practitioner order or complex needs related to functional status, cognitive ability, or social support system  Outcome: Progressing     Problem: Knowledge Deficit  Goal: Patient/family/caregiver demonstrates understanding of disease process, treatment plan, medications, and discharge instructions  Description: Complete learning assessment and assess knowledge base    Interventions:  - Provide teaching at level of understanding  - Provide teaching via preferred learning methods  Outcome: Progressing     Problem: CARDIOVASCULAR - ADULT  Goal: Maintains optimal cardiac output and hemodynamic stability  Description: INTERVENTIONS:  - Monitor I/O, vital signs and rhythm  - Monitor for S/S and trends of decreased cardiac output  - Administer and titrate ordered vasoactive medications to optimize hemodynamic stability  - Assess quality of pulses, skin color and temperature  - Assess for signs of decreased coronary artery perfusion  - Instruct patient to report change in severity of symptoms  Outcome: Progressing  Goal: Absence of cardiac dysrhythmias or at baseline rhythm  Description: INTERVENTIONS:  - Continuous cardiac monitoring, vital signs, obtain 12 lead EKG if ordered  - Administer antiarrhythmic and heart rate control medications as ordered  - Monitor electrolytes and administer replacement therapy as ordered  Outcome: Progressing     Problem: SKIN/TISSUE INTEGRITY - ADULT  Goal: Skin integrity remains intact  Description: INTERVENTIONS  - Identify patients at risk for skin breakdown  - Assess and monitor skin integrity  - Assess and monitor nutrition and hydration status  - Monitor labs (i e  albumin)  - Assess for incontinence   - Turn and reposition patient  - Assist with mobility/ambulation  - Relieve pressure over bony prominences  - Avoid friction and shearing  - Provide appropriate hygiene as needed including keeping skin clean and dry  - Evaluate need for skin moisturizer/barrier cream  - Collaborate with interdisciplinary team (i e  Nutrition, Rehabilitation, etc )   - Patient/family teaching  Outcome: Progressing  Goal: Incision(s), wounds(s) or drain site(s) healing without S/S of infection  Description: INTERVENTIONS  - Assess and document risk factors for skin impairment   - Assess and document dressing, incision, wound bed, drain sites and surrounding tissue  - Consider nutrition services referral as needed  - Oral mucous membranes remain intact  - Provide patient/ family education  Outcome: Progressing  Goal: Oral mucous membranes remain intact  Description: INTERVENTIONS  - Assess oral mucosa and hygiene practices  - Implement preventative oral hygiene regimen  - Implement oral medicated treatments as ordered  - Initiate Nutrition services referral as needed  Outcome: Progressing     Problem: Prexisting or High Potential for Compromised Skin Integrity  Goal: Skin integrity is maintained or improved  Description: INTERVENTIONS:  - Identify patients at risk for skin breakdown  - Assess and monitor skin integrity  - Assess and monitor nutrition and hydration status  - Monitor labs   - Assess for incontinence   - Turn and reposition patient  - Assist with mobility/ambulation  - Relieve pressure over bony prominences  - Avoid friction and shearing  - Provide appropriate hygiene as needed including keeping skin clean and dry  - Evaluate need for skin moisturizer/barrier cream  - Collaborate with interdisciplinary team   - Patient/family teaching  - Consider wound care consult   Outcome: Progressing     Problem: Nutrition/Hydration-ADULT  Goal: Nutrient/Hydration intake appropriate for improving, restoring or maintaining nutritional needs  Description: Monitor and assess patient's nutrition/hydration status for malnutrition  Collaborate with interdisciplinary team and initiate plan and interventions as ordered  Monitor patient's weight and dietary intake as ordered or per policy  Utilize nutrition screening tool and intervene as necessary  Determine patient's food preferences and provide high-protein, high-caloric foods as appropriate       INTERVENTIONS:  - Monitor oral intake, urinary output, labs, and treatment plans  - Assess nutrition and hydration status and recommend course of action  - Evaluate amount of meals eaten  - Assist patient with eating if necessary   - Allow adequate time for meals  - Recommend/ encourage appropriate diets, oral nutritional supplements, and vitamin/mineral supplements  - Order, calculate, and assess calorie counts as needed  - Recommend, monitor, and adjust tube feedings and TPN/PPN based on assessed needs  - Assess need for intravenous fluids  - Provide specific nutrition/hydration education as appropriate  - Include patient/family/caregiver in decisions related to nutrition  Outcome: Progressing     Problem: RESPIRATORY - ADULT  Goal: Achieves optimal ventilation and oxygenation  Description: INTERVENTIONS:  - Assess for changes in respiratory status  - Assess for changes in mentation and behavior  - Position to facilitate oxygenation and minimize respiratory effort  - Oxygen administered by appropriate delivery if ordered  - Initiate smoking cessation education as indicated  - Encourage broncho-pulmonary hygiene including cough, deep breathe, Incentive Spirometry  - Assess the need for suctioning and aspirate as needed  - Assess and instruct to report SOB or any respiratory difficulty  - Respiratory Therapy support as indicated  Outcome: Progressing     Problem: METABOLIC, FLUID AND ELECTROLYTES - ADULT  Goal: Electrolytes maintained within normal limits  Description: INTERVENTIONS:  - Monitor labs and assess patient for signs and symptoms of electrolyte imbalances  - Administer electrolyte replacement as ordered  - Monitor response to electrolyte replacements, including repeat lab results as appropriate  - Instruct patient on fluid and nutrition as appropriate  Outcome: Progressing  Goal: Fluid balance maintained  Description: INTERVENTIONS:  - Monitor labs   - Monitor I/O and WT  - Instruct patient on fluid and nutrition as appropriate  - Assess for signs & symptoms of volume excess or deficit  Outcome: Progressing  Goal: Glucose maintained within target range  Description: INTERVENTIONS:  - Monitor Blood Glucose as ordered  - Assess for signs and symptoms of hyperglycemia and hypoglycemia  - Administer ordered medications to maintain glucose within target range  - Assess nutritional intake and initiate nutrition service referral as needed  Outcome: Progressing     Problem: HEMATOLOGIC - ADULT  Goal: Maintains hematologic stability  Description: INTERVENTIONS  - Assess for signs and symptoms of bleeding or hemorrhage  - Monitor labs  - Administer supportive blood products/factors as ordered and appropriate  Outcome: Progressing     Problem: MUSCULOSKELETAL - ADULT  Goal: Maintain or return mobility to safest level of function  Description: INTERVENTIONS:  - Assess patient's ability to carry out ADLs; assess patient's baseline for ADL function and identify physical deficits which impact ability to perform ADLs (bathing, care of mouth/teeth, toileting, grooming, dressing, etc )  - Assess/evaluate cause of self-care deficits   - Assess range of motion  - Assess patient's mobility  - Assess patient's need for assistive devices and provide as appropriate  - Encourage maximum independence but intervene and supervise when necessary  - Involve family in performance of ADLs  - Assess for home care needs following discharge   - Consider OT consult to assist with ADL evaluation and planning for discharge  - Provide patient education as appropriate  Outcome: Progressing

## 2021-03-23 NOTE — PROGRESS NOTES
NEPHROLOGY PROGRESS NOTE   Daksha Harris 60 y o  female MRN: [id number]  Unit/Bed#: -[facility] Encounter: [id number]  Reason for Consult: ADI    Plan:  -creatinine slightly improved after 250 mL D5 bolus yesterday to 4 24 today   -calculated FENa 0 9%, indicated pre-renal state    -continue to hold anticoagulation in preparation for renal biopsy (last dose 3/22/21)  -stable acidosis, continues on sodium bicarbonate 1300 mg QID  -serologies pending, workup so far negative   -amlodipine increased to 5 mg daily  Goal systolic blood pressure [phone]   -completed antibiotics for lower extremity cellulitis  -volume status stable, weight remains unchanged, ? Accuracy and documented output  Reports urinating  Poor oral intake   -hypernatremia improved, s/p 250 cc hypotonic fluid administration yesterday   -will give albumin today x2  ASSESSMENT/PLAN:  Acute kidney injury (POA) on CKD stage 4, versus underlying progression of chronic disease:  Suspected prerenal secondary to volume depletion in the setting of diarrhea and hypotension as well as ATN with cellulitis  -presents with creatinine of 3 23   -creatinine increased to 3 93 with rise to 4 37 on 03/21   -creatinine 4 24 today after 250 mL D5 bolus  -baseline creatinine previously low 2s in 2020    -diuretics currently held, last dose on 03/17   -received IV albumin for hypotension  -repeat UA shows protein, 0-1 RBCs, 2-4 hyaline casts, 5-10 granular cast   -will repeat bladder scan PVR  -urine eosinophils 0   -UCPR: 4 57 g  -BHUMI, hepatitis panel, complements, rapid HIV normal     -RPR, RF, GBM, ASO, ANCA, anti-DNA double stranded, cryo: pending  -UA:  3+ protein, no rbc's, fine granular cast   -imaging unremarkable for hydronephrosis    -SPEP negative/UPEP pending   -anticoagulation held in preparation for potential biopsy   -avoid nephrotoxins, hypotension, IV contrast      Proteinuria:  -previous UPCR 3 g in 2018   -recent UPCR 4 57    -will continue to monitor  -SPEP negative/UPEP pending    -not on Ace/Arb   -planning on renal bx, anticoags on hold       Metabolic acidosis:  in the setting of acute renal failure as well as diarrhea  (improved)  -continues on oral bicarbonate tablets  -CO2 level stable at 21       Hypotension: (resolved) blood pressure now stable between 140s in 160s  -received IV albumin administration   -avoid hypotension or high fluctuations in blood pressure   -outpatient medications:  Coreg 25 mg 2 times per day, hydralazine 75 mg 2 times per day, amlodipine 2 5 mg daily, torsemide 40 mg daily   -continues on Coreg 25 mg b i d , hydralazine 75 mg 2 times per day, amlodipine 2 5 mg daily  -increase amlodipine to 5 mg po daily  -holding parameters adjusted on antihypertensive for systolic blood pressure less than 588       Diastolic heart failure:  Presenting with shortness of breath and lower extremity edema  Reports stop taking torsemide approximately 1 week ago  Most recent echo showed EF of 45-50% with grade 2 diastolic dysfunction, mild-to-moderate AS, dilated IVC  -chest x-ray negative for acute cardiopulmonary disease   -accuracy of I/O? Documented 350 UO for yesterday   -continue daily weights, I/O, fluid restriction   -documented weight increased since admission 3 lbs  -outpatient diuretic:  Torsemide 40 mg daily      Bilateral lower extremity edema:  Suspect 3rd spacing   -lower extremity Doppler negative for DVT  -continue to elevate legs throughout the day  -recommend low-salt diet      Anemia: Hgb stable in the low 7's    -continue to monitor and transfuse as needed for hemoglobin less than 7 0   -iron panel showed iron sat 14%   -avoiding IV iron due to infection   -continue oral iron supplements      Lower extremity cellulitis:  -completed antibiotics      MBD in CKD:  -hyperphosphatemia, continues on increased dose of phosphorus binder  Last phos level 5 4    Will continue to monitor, check phosphorus level again on Wednesday   -Mg 2 1     Hypernatremia: improved    -Na level 145 after 250 mL D5W bolus yesterday   -continue to monitor      Other: Diabetes, obesity      Disposition:  Requiring additional stay due to medical needs  SUBJECTIVE:  Patient seen Yolanda Felton in bed  Denies chest pain or shortness of breath  Endorses bilateral lower extremity edema along with decreased appetite       OBJECTIVE:  Current Weight: Weight - Scale: 120 kg (264 lb 12 8 oz)  Vitals:    03/22/21 2315 03/23/21 0539 03/23/21 0742 03/23/21 0744   BP: 153/54  166/62 166/62   BP Location:       Pulse: 66      Resp: 18  16 18   Temp: 98 °F (36 7 °C)   97 5 °F (36 4 °C)   TempSrc:       SpO2: 90%      Weight:  120 kg (264 lb 12 8 oz)     Height:           Intake/Output Summary (Last 24 hours) at 3/23/2021 0945  Last data filed at 3/22/2021 1930  Gross per 24 hour   Intake 250 ml   Output --   Net 250 ml     General: NAD  Skin: warm, dry, intact, no rash  HEENT: Moist mucous membranes, sclera anicteric, normocephalic, atraumatic  Neck: No apparent JVD appreciated  Chest: lung sounds clear B/L, on RA   CVS:Regular rate and rhythm, no murmer   Abdomen: Soft, round, non-tender, +BS, obese  Extremities: +B/L LE edema present, wrapped in ACE BL  Neuro: alert and oriented  Psych: appropriate mood and affect     Medications:    Current Facility-Administered Medications:     acetaminophen (TYLENOL) tablet 650 mg, 650 mg, Oral, Q6H PRN, Sarah Sanchez PA-C, 650 mg at 03/17/21 2320    amLODIPine (NORVASC) tablet 2 5 mg, 2 5 mg, Oral, Daily, Madge Romberg, CRNP, 2 5 mg at 03/23/21 0943    atorvastatin (LIPITOR) tablet 40 mg, 40 mg, Oral, Daily, Sarah Sanchez PA-C, 40 mg at 03/23/21 0943    calcium acetate (PHOSLO) capsule 667 mg, 667 mg, Oral, TID With Meals, HOWARD Sanchez, 667 mg at 03/23/21 0740    carvedilol (COREG) tablet 25 mg, 25 mg, Oral, BID, Madge Romberg, CRNP, 25 mg at 03/23/21 0943    diphenhydrAMINE (BENADRYL) tablet 25 mg, 25 mg, Oral, Q6H PRN, Raymond Ford PA-C, 25 mg at 03/17/21 0545    ferrous sulfate tablet 325 mg, 325 mg, Oral, Daily With Breakfast, Brinda Bustos MD, 325 mg at 03/23/21 0740    heparin (porcine) subcutaneous injection 5,000 Units, 5,000 Units, Subcutaneous, Q8H [address], 5,000 Units at 03/23/21 0618 **AND** [COMPLETED] Platelet count, , , Once, Raymond Ford PA-C    hydrALAZINE (APRESOLINE) tablet 75 mg, 75 mg, Oral, BID, [place], 75 mg at 03/23/21 0943    insulin glargine (LANTUS) subcutaneous injection 30 Units 0 3 mL, 30 Units, Subcutaneous, QAMJose MD, 30 Units at 03/22/21 0826    insulin lispro (HumaLOG) 100 units/mL subcutaneous injection 1-6 Units, 1-6 Units, Subcutaneous, TID AC, 1 Units at 03/22/21 1718 **AND** Fingerstick Glucose (POCT), , , TID AC, Sarah Sanchez PA-C    insulin lispro (HumaLOG) 100 units/mL subcutaneous injection 1-6 Units, 1-6 Units, Subcutaneous, HS, Sarah Sanchez PA-C, 1 Units at 03/21/21 2124    ondansetron (ZOFRAN) injection 4 mg, 4 mg, Intravenous, Q6H PRN, Jose Flores MD, 4 mg at 03/19/21 1350    oxyCODONE (ROXICODONE) IR tablet 5 mg, 5 mg, Oral, Q6H PRN, Jose Flores MD    sodium bicarbonate tablet 1,300 mg, 1,300 mg, Oral, 4x Daily, HOWARD Puente, 1,300 mg at 03/23/21 9709    Laboratory Results:  Results from last 7 days   Lab Units 03/23/21  0518 03/22/21  0417 03/21/21  0522 03/20/21  0537 03/19/21  0513 03/18/21  0530  03/17/21  0421   WBC Thousand/uL 9 84 9 79 9 57 10 21* 11 57*  --    < > 7 61   HEMOGLOBIN g/dL 7 3* 7 3* 7 0* 7 9* 7 6*  --    < > 7 1*   HEMATOCRIT % 24 1* 24 4* 24 4* 27 0* 25 9*  --    < > 24 0*   PLATELETS Thousands/uL 207 197 209 240 219  --    < > 187   SODIUM mmol/L 145 146* 143 146* 145 147*  --  145   POTASSIUM mmol/L 4 3 4 4 4 3 4 3 4 5 4 4  --  4 7   CHLORIDE mmol/L 110* 112* 113* 113* 114* 115*  --  115*   CO2 mmol/L 21 18* 18* 18* 18* 16*  --  17*   BUN mg/dL 73* 75* 72* 71* 72* 75*  --  74*   CREATININE mg/dL 4 24* 4 50* 4 37* 3 93* 3 67* 3 76*  --  3 57*   CALCIUM mg/dL 7 9* 7 9* 7 4* 7 6* 7 5* 7 4*  --  7 6*   MAGNESIUM mg/dL  --  2 1  --   --   --  1 9  --  1 8   PHOSPHORUS mg/dL 3 9  --   --  5 4* 4 5* 5 6*  --  6 1*   ALK PHOS U/L 94  --   --  89 78 82   < >  --    ALT U/L 26  --   --  13 12 14   < >  --    AST U/L 22  --   --  15 18 12   < >  --     < > = values in this interval not displayed

## 2021-03-23 NOTE — PROGRESS NOTES
Patient assisted back to chair after using the bathroom  The patient stated, "I don't feel so good  I feel like my blood sugar is low"  I checked her blood sugar and it read < 31  Dr Ana Maria Desai was at bedside to round on patient and he advised me to place an order of D50% 25ml (1/2 amp)  After apprximately 5 minutes the patient stated, "I feel a little better"  Glucose blood draw to be collected momentarily

## 2021-03-23 NOTE — PLAN OF CARE
Problem: Potential for Falls  Goal: Patient will remain free of falls  Description: INTERVENTIONS:  - Assess patient frequently for physical needs  -  Identify cognitive and physical deficits and behaviors that affect risk of falls    -  [place] fall precautions as indicated by assessment   - Educate patient/family on patient safety including physical limitations  - Instruct patient to call for assistance with activity based on assessment  - Modify environment to reduce risk of injury  - Consider OT/PT consult to assist with strengthening/mobility  Outcome: Progressing     Problem: PAIN - ADULT  Goal: Verbalizes/displays adequate comfort level or baseline comfort level  Description: Interventions:  - Encourage patient to monitor pain and request assistance  - Assess pain using appropriate pain scale  - Administer analgesics based on type and severity of pain and evaluate response  - Implement non-pharmacological measures as appropriate and evaluate response  - Consider cultural and social influences on pain and pain management  - Notify physician/advanced practitioner if interventions unsuccessful or patient reports new pain  Outcome: Progressing     Problem: INFECTION - ADULT  Goal: Absence or prevention of progression during hospitalization  Description: INTERVENTIONS:  - Assess and monitor for signs and symptoms of infection  - Monitor lab/diagnostic results  - Monitor all insertion sites, i e  indwelling lines, tubes, and drains  - Monitor endotracheal if appropriate and nasal secretions for changes in amount and color  - [place] appropriate cooling/warming therapies per order  - Administer medications as ordered  - Instruct and encourage patient and family to use good hand hygiene technique  - Identify and instruct in appropriate isolation precautions for identified infection/condition  Outcome: Progressing  Goal: Absence of fever/infection during neutropenic period  Description: INTERVENTIONS:  - Monitor WBC    Outcome: Progressing     Problem: SAFETY ADULT  Goal: Patient will remain free of falls  Description: INTERVENTIONS:  - Assess patient frequently for physical needs  -  Identify cognitive and physical deficits and behaviors that affect risk of falls    -  [place] fall precautions as indicated by assessment   - Educate patient/family on patient safety including physical limitations  - Instruct patient to call for assistance with activity based on assessment  - Modify environment to reduce risk of injury  - Consider OT/PT consult to assist with strengthening/mobility  Outcome: Progressing  Goal: Maintain or return to baseline ADL function  Description: INTERVENTIONS:  -  Assess patient's ability to carry out ADLs; assess patient's baseline for ADL function and identify physical deficits which impact ability to perform ADLs (bathing, care of mouth/teeth, toileting, grooming, dressing, etc )  - Assess/evaluate cause of self-care deficits   - Assess range of motion  - Assess patient's mobility; develop plan if impaired  - Assess patient's need for assistive devices and provide as appropriate  - Encourage maximum independence but intervene and supervise when necessary  - Involve family in performance of ADLs  - Assess for home care needs following discharge   - Consider OT consult to assist with ADL evaluation and planning for discharge  - Provide patient education as appropriate  Outcome: Progressing  Goal: Maintain or return mobility status to optimal level  Description: INTERVENTIONS:  - Assess patient's baseline mobility status (ambulation, transfers, stairs, etc )    - Identify cognitive and physical deficits and behaviors that affect mobility  - Identify mobility aids required to assist with transfers and/or ambulation (gait belt, sit-to-stand, lift, walker, cane, etc )  - [place]yrus fall precautions as indicated by assessment  - Record patient progress and toleration of activity level on Mobility SBAR; progress patient to next Phase/Stage  - Instruct patient to call for assistance with activity based on assessment  - Consider rehabilitation consult to assist with strengthening/weightbearing, etc   Outcome: Progressing

## 2021-03-24 LAB
ANION GAP SERPL CALCULATED.3IONS-SCNC: 12 MMOL/L (ref 4–13)
BUN SERPL-MCNC: 75 MG/DL (ref 5–25)
C-ANCA TITR SER IF: NORMAL TITER
CALCIUM SERPL-MCNC: 8 MG/DL (ref 8.3–10.1)
CHLORIDE SERPL-SCNC: 110 MMOL/L (ref 100–108)
CO2 SERPL-SCNC: 24 MMOL/L (ref 21–32)
CREAT SERPL-MCNC: 4.38 MG/DL (ref 0.6–1.3)
ERYTHROCYTE [DISTWIDTH] IN BLOOD BY AUTOMATED COUNT: 16.5 % (ref 11.6–15.1)
GBM AB SER IA-ACNC: 3 UNITS (ref 0–20)
GFR SERPL CREATININE-BSD FRML MDRD: 10 ML/MIN/1.73SQ M
GLUCOSE SERPL-MCNC: 122 MG/DL (ref 65–140)
GLUCOSE SERPL-MCNC: 123 MG/DL (ref 65–140)
GLUCOSE SERPL-MCNC: 153 MG/DL (ref 65–140)
GLUCOSE SERPL-MCNC: 161 MG/DL (ref 65–140)
GLUCOSE SERPL-MCNC: 198 MG/DL (ref 65–140)
HCT VFR BLD AUTO: 25.6 % (ref 34.8–46.1)
HGB BLD-MCNC: 7.6 G/DL (ref 11.5–15.4)
MCH RBC QN AUTO: 27.7 PG (ref 26.8–34.3)
MCHC RBC AUTO-ENTMCNC: 29.7 G/DL (ref 31.4–37.4)
MCV RBC AUTO: 93 FL (ref 82–98)
MYELOPEROXIDASE AB SER IA-ACNC: <9 U/ML (ref 0–9)
P-ANCA ATYPICAL TITR SER IF: NORMAL TITER
P-ANCA TITR SER IF: NORMAL TITER
PLATELET # BLD AUTO: 211 THOUSANDS/UL (ref 149–390)
PMV BLD AUTO: 11 FL (ref 8.9–12.7)
POTASSIUM SERPL-SCNC: 4.5 MMOL/L (ref 3.5–5.3)
PROTEINASE3 AB SER IA-ACNC: <3.5 U/ML (ref 0–3.5)
RBC # BLD AUTO: 2.74 MILLION/UL (ref 3.81–5.12)
SODIUM SERPL-SCNC: 146 MMOL/L (ref 136–145)
WBC # BLD AUTO: 8.92 THOUSAND/UL (ref 4.31–10.16)

## 2021-03-24 PROCEDURE — NC001 PR NO CHARGE: Performed by: RADIOLOGY

## 2021-03-24 PROCEDURE — 99232 SBSQ HOSP IP/OBS MODERATE 35: CPT | Performed by: INTERNAL MEDICINE

## 2021-03-24 PROCEDURE — 80048 BASIC METABOLIC PNL TOTAL CA: CPT | Performed by: INTERNAL MEDICINE

## 2021-03-24 PROCEDURE — 85027 COMPLETE CBC AUTOMATED: CPT | Performed by: INTERNAL MEDICINE

## 2021-03-24 PROCEDURE — 82948 REAGENT STRIP/BLOOD GLUCOSE: CPT

## 2021-03-24 RX ORDER — SODIUM CHLORIDE 9 MG/ML
75 INJECTION, SOLUTION INTRAVENOUS CONTINUOUS
Status: DISCONTINUED | OUTPATIENT
Start: 2021-03-24 | End: 2021-03-25

## 2021-03-24 RX ADMIN — SODIUM CHLORIDE 75 ML/HR: 0.9 INJECTION, SOLUTION INTRAVENOUS at 16:47

## 2021-03-24 RX ADMIN — INSULIN LISPRO 1 UNITS: 100 INJECTION, SOLUTION INTRAVENOUS; SUBCUTANEOUS at 12:05

## 2021-03-24 RX ADMIN — HEPARIN SODIUM 5000 UNITS: 5000 INJECTION INTRAVENOUS; SUBCUTANEOUS at 14:15

## 2021-03-24 RX ADMIN — SODIUM BICARBONATE 650 MG TABLET 1300 MG: at 08:17

## 2021-03-24 RX ADMIN — HYDRALAZINE HYDROCHLORIDE 75 MG: 25 TABLET, FILM COATED ORAL at 22:12

## 2021-03-24 RX ADMIN — ALBUMIN (HUMAN) 12.5 G: 0.25 INJECTION, SOLUTION INTRAVENOUS at 22:04

## 2021-03-24 RX ADMIN — CALCIUM ACETATE 667 MG: 667 CAPSULE ORAL at 17:12

## 2021-03-24 RX ADMIN — CALCIUM ACETATE 667 MG: 667 CAPSULE ORAL at 08:10

## 2021-03-24 RX ADMIN — SODIUM BICARBONATE 650 MG TABLET 1300 MG: at 17:12

## 2021-03-24 RX ADMIN — IRON SUCROSE 200 MG: 20 INJECTION, SOLUTION INTRAVENOUS at 12:57

## 2021-03-24 RX ADMIN — DEXTROSE 500 ML: 5 SOLUTION INTRAVENOUS at 11:09

## 2021-03-24 RX ADMIN — CARVEDILOL 25 MG: 12.5 TABLET, FILM COATED ORAL at 17:12

## 2021-03-24 RX ADMIN — CARVEDILOL 25 MG: 12.5 TABLET, FILM COATED ORAL at 08:09

## 2021-03-24 RX ADMIN — INSULIN LISPRO 2 UNITS: 100 INJECTION, SOLUTION INTRAVENOUS; SUBCUTANEOUS at 21:55

## 2021-03-24 RX ADMIN — CALCIUM ACETATE 667 MG: 667 CAPSULE ORAL at 12:06

## 2021-03-24 RX ADMIN — AMLODIPINE BESYLATE 5 MG: 5 TABLET ORAL at 08:11

## 2021-03-24 RX ADMIN — INSULIN GLARGINE 25 UNITS: 100 INJECTION, SOLUTION SUBCUTANEOUS at 08:25

## 2021-03-24 RX ADMIN — HEPARIN SODIUM 5000 UNITS: 5000 INJECTION INTRAVENOUS; SUBCUTANEOUS at 06:22

## 2021-03-24 RX ADMIN — HYDRALAZINE HYDROCHLORIDE 75 MG: 25 TABLET, FILM COATED ORAL at 08:11

## 2021-03-24 RX ADMIN — DEXTROSE 500 ML: 5 SOLUTION INTRAVENOUS at 10:55

## 2021-03-24 RX ADMIN — INSULIN LISPRO 1 UNITS: 100 INJECTION, SOLUTION INTRAVENOUS; SUBCUTANEOUS at 17:11

## 2021-03-24 RX ADMIN — SODIUM BICARBONATE 650 MG TABLET 1300 MG: at 22:04

## 2021-03-24 RX ADMIN — HEPARIN SODIUM 5000 UNITS: 5000 INJECTION INTRAVENOUS; SUBCUTANEOUS at 21:55

## 2021-03-24 RX ADMIN — ATORVASTATIN CALCIUM 40 MG: 40 TABLET, FILM COATED ORAL at 08:11

## 2021-03-24 RX ADMIN — ALBUMIN (HUMAN) 12.5 G: 0.25 INJECTION, SOLUTION INTRAVENOUS at 08:11

## 2021-03-24 RX ADMIN — SODIUM BICARBONATE 650 MG TABLET 1300 MG: at 12:06

## 2021-03-24 NOTE — ASSESSMENT & PLAN NOTE
Lab Results   Component Value Date    HGBA1C 7 6 (H) 03/15/2021       Recent Labs     03/23/21  1349 03/23/21  1410 03/23/21  1613 03/23/21  1756   POCGLU 31* 98 79 221*       Blood Sugar Average: Last 72 hrs:  · (P) 823 [id number]     Patient has type 2 diabetes on insulin with stage IV chronic disease  Patient was hypoglycemic today because she did not eat much lunch      I decreased Lantus 25 units at bedtime and will stop pre meal Humalog

## 2021-03-24 NOTE — ASSESSMENT & PLAN NOTE
Lab Results   Component Value Date    HGBA1C 7 6 (H) 03/15/2021       Recent Labs     03/23/21  2328 03/24/21  0746 03/24/21  1031 03/24/21  1639   POCGLU 204* 123 161* 153*       Blood Sugar Average: Last 72 hrs:  · (P) 120 [id number]     Patient has type 2 diabetes on insulin with stage IV chronic disease      Patient has no hypoglycemic episodes, will continue Lantus and sliding scale insulin

## 2021-03-24 NOTE — ASSESSMENT & PLAN NOTE
Wt Readings from Last 3 Encounters:   03/24/21 121 kg (266 lb 3 2 oz)   02/21/20 117 kg (258 lb)   01/07/20 117 kg (258 lb 12 8 oz)     Patient admitted with increased lower extremity edema due to acute systolic CHF due to noncompliance with medications  Ejection fraction was 45-50% on this admission    Patient was treated with IV Bumex then IV diuretics were held because of worsening renal function    Lungs are clear to auscultation but patient has persistent lower extremity edema    Diuretics are on hold due to acute kidney injury    I called patient's daughter at the phone number on the chart but no one answered the phone and I could not leave a message on March 24th

## 2021-03-24 NOTE — CONSULTS
[facility] Electronic [facility] Record Consult  IR has been consulted to evaluate the patient, determine the appropriate procedure, and whether or not a procedure can and should be performed regarding the care of Nishi Brar  We were consulted by nephrology concerning John Morenoer, and to possibly perform a renal core biopsy if medically appropriate for the patient  The patient is aware that a specialty consultation is taking place, and agrees to the IR Consult on their behalf  Assessment/Plan:   61 yo female with acute on chronic kidney disease with proteinuria  Longstanding diabetes  Will have IR staff confirm last anticoagulation dose and will arrange to have scheduled    I spent 15 minutes in medical consultative time evaluating the medical record and imaging of Nishi Brar  Thank you for allowing [facility] to participate in the care of Nishi Brar  Please don't hesitate to call or TigerText us with any questions       Starla Geronimo, DO

## 2021-03-24 NOTE — PROGRESS NOTES
NEPHROLOGY PROGRESS NOTE   Guanaco Das 60 y o  female MRN: [id number]  Unit/Bed#: -01 Encounter: [id number]  Reason for Consult: ADI (POA) on CKD IV    PLAN:   -creatinine continues to be elevated  -continues on albumin 2 times daily  -will give 500 cc of D5W today  -repeating bladder scan PVR  -anticoagulation remains on hold for future renal biopsy  Planning on biopsy tomorrow  Amrik kit in chart   -serologies pending, so far negative   -hypernatremia, Na level increased to 146    -stable bilateral extremity edema   -if exhibiting acute shortness of breath, may give diuretics  -continues on bicarbonate supplements for chronic acidosis  -completed antibiotics cellulitis  ASSESSMENT/PLAN:  Acute kidney injury (POA) on CKD stage 4, versus underlying progression of chronic disease:  Suspected prerenal secondary to volume depletion in the setting of diarrhea and hypotension as well as ATN with cellulitis  -presents with creatinine of 3 23   -creatinine increased to 3 93 with rise to 4 37 on 03/21   -baseline creatinine previously low 2s in 2020    -diuretics currently held, last dose on 03/17   -received IV albumin for hypotension  -repeat UA shows protein, 0-1 RBCs, 2-4 hyaline casts, 5-10 granular cast   -will repeat bladder scan PVR  -urine eosinophils 0   -UCPR: 4 57 g  -BHUMI, hepatitis panel, complements, rapid HIV, RPR, RF, ASO, anti-DNA double stranded normal     -GBM, ANCA, cryo: pending  -UA:  3+ protein, no rbc's, fine granular cast   -imaging unremarkable for hydronephrosis  -SPEP negative/UPEP pending   -anticoagulation held in preparation for potential biopsy, will need to hold for 5 days  Likely Monday due to IR availability on Monday    -avoid nephrotoxins, hypotension, IV contrast      Proteinuria:  -previous UPCR 3 g in 2018   -recent UPCR 4 57    -will continue to monitor  -SPEP negative/UPEP pending    -not on Ace/Arb   -planning on renal bx, anticoags on hold     Metabolic acidosis:  in the setting of acute renal failure as well as diarrhea  (improved)  -continues on oral bicarbonate tablets      Hypotension: (resolved) blood pressure now stable between 140s in 160s  -received IV albumin administration   -avoid hypotension or high fluctuations in blood pressure   -outpatient medications:  Coreg 25 mg 2 times per day, hydralazine 75 mg 2 times per day, amlodipine 2 5 mg daily, torsemide 40 mg daily   -continues on Coreg 25 mg b i d , hydralazine 75 mg 2 times per day, amlodipine 5 mg daily  -holding parameters adjusted on antihypertensive for systolic blood pressure less than 263       Diastolic heart failure:  Presenting with shortness of breath and lower extremity edema   Reports stop taking torsemide approximately 1 week ago   Most recent echo showed EF of 45-50% with grade 2 diastolic dysfunction, mild-to-moderate AS, dilated IVC  -chest x-ray negative for acute cardiopulmonary disease   -continue daily weights, I/O, fluid restriction  -outpatient diuretic:  Torsemide 40 mg daily      Bilateral lower extremity edema:  Suspect 3rd spacing   -lower extremity Doppler negative for DVT  -continue to elevate legs throughout the day  -recommend low-salt diet      Anemia: Hgb stable in the low 7's    -continue to monitor and transfuse as needed for hemoglobin less than 7 0   -iron panel showed iron sat 14%  -placed on IV Venofer 200 mg daily   -checking occult stools       Lower extremity cellulitis:  -completed antibiotics      MBD in CKD:  -hyperphosphatemia, continues on increased dose of phosphorus binder  Last phos level 5  4   Will continue to monitor, check phosphorus level in am    -Mg 2 1     Hypernatremia:   -Na level increased this morning to 146    -continue to monitor      Other: Diabetes, obesity        Disposition:  Requiring additional stay due to medical needs  SUBJECTIVE:  The patient is resting in bed    She denies chest pain or increased shortness of breath  She denies nausea, vomiting, diarrhea  She reports that she is now eating but not drinking very much  She reports itching of the skin  The patient is tearful during conversation      OBJECTIVE:  Current Weight: Weight - Scale: 121 kg (266 lb 3 2 oz)  Vitals:    03/23/21 1611 03/23/21 2251 03/24/21 0539 03/24/21 0749   BP: 165/63 163/64  159/62   BP Location: Left arm      Pulse: 68   65   Resp: 20 (!) 24  18   Temp: 97 8 °F (36 6 °C) 97 8 °F (36 6 °C)  97 7 °F (36 5 °C)   TempSrc: Oral      SpO2: 94%   (!) 87%   Weight:   121 kg (266 lb 3 2 oz)    Height:           Intake/Output Summary (Last 24 hours) at 3/24/2021 2675  Last data filed at 3/24/2021 0222  Gross per 24 hour   Intake 120 ml   Output 700 ml   Net -580 ml     General: NAD  Skin: warm, dry, intact, no rash  HEENT: Moist mucous membranes, sclera anicteric, normocephalic, atraumatic  Neck: No apparent JVD appreciated  Chest: lung sounds clear B/L, on RA   CVS:Regular rate and rhythm, no murmer   Abdomen: Soft, round, non-tender, +BS, obese  Extremities: B/L LE edema present  Neuro: alert and oriented  Psych: appropriate mood and affect     Medications:    Current Facility-Administered Medications:     acetaminophen (TYLENOL) tablet 650 mg, 650 mg, Oral, Q6H PRN, Sarah Sanchez PA-C, 650 mg at 03/17/21 2320    albumin human (FLEXBUMIN) 25 % injection 12 5 g, 12 5 g, Intravenous, BID, Rondal Carolyn, CRNP, Last Rate: 2 mL/hr at 03/23/21 1039, 12 5 g at 03/24/21 0811    amLODIPine (NORVASC) tablet 5 mg, 5 mg, Oral, Daily, Rondal Carolyn, CRNP, 5 mg at 03/24/21 5624    atorvastatin (LIPITOR) tablet 40 mg, 40 mg, Oral, Daily, Sarah Sanchez PA-C, 40 mg at 03/24/21 6481    calcium acetate (PHOSLO) capsule 667 mg, 667 mg, Oral, TID With Meals, HOWARD Salguero, 667 mg at 03/24/21 0810    carvedilol (COREG) tablet 25 mg, 25 mg, Oral, BID, HOWARD Demarco, 25 mg at 03/24/21 0809    dextrose 50 % IV solution 25 mL, 25 mL, Intravenous, Once, Fannie Joya,     diphenhydrAMINE (BENADRYL) tablet 25 mg, 25 mg, Oral, Q6H PRN, Juana Juares PA-C, 25 mg at 03/17/21 0545    heparin (porcine) subcutaneous injection 5,000 Units, 5,000 Units, Subcutaneous, Q8H [address], 5,000 Units at 03/24/21 0622 **AND** [COMPLETED] Platelet count, , , Once, Juana Juares PA-C    hydrALAZINE (APRESOLINE) tablet 75 mg, 75 mg, Oral, BID, HOWARD Hopper, 75 mg at 03/24/21 0811    insulin glargine (LANTUS) subcutaneous injection 25 Units 0 25 mL, 25 Units, Subcutaneous, QAM, Christine Mayo MD, 25 Units at 03/24/21 0825    insulin lispro (HumaLOG) 100 units/mL subcutaneous injection 1-6 Units, 1-6 Units, Subcutaneous, TID AC, 1 Units at 03/22/21 1718 **AND** Fingerstick Glucose (POCT), , , TID AC, Sarah Sanchez PA-C    insulin lispro (HumaLOG) 100 units/mL subcutaneous injection 1-6 Units, 1-6 Units, Subcutaneous, HS, Sarah Sanchez PA-C, 2 Units at 03/23/21 2200    ondansetron (ZOFRAN) injection 4 mg, 4 mg, Intravenous, Q6H PRN, Casey Bowie MD, 4 mg at 03/19/21 1350    oxyCODONE (ROXICODONE) IR tablet 5 mg, 5 mg, Oral, Q6H PRN, Casey Bowie MD    sodium bicarbonate tablet 1,300 mg, 1,300 mg, Oral, 4x Daily, HOWARD Puente, 1,300 mg at 03/24/21 0817    Laboratory Results:  Results from last 7 days   Lab Units 03/24/21  0540 03/23/21  0518 03/22/21  0417  03/20/21  0537 03/19/21  0513 03/18/21  0530   WBC Thousand/uL 8 92 9 84 9 79   < > 10 21* 11 57*  --    HEMOGLOBIN g/dL 7 6* 7 3* 7 3*   < > 7 9* 7 6*  --    HEMATOCRIT % 25 6* 24 1* 24 4*   < > 27 0* 25 9*  --    PLATELETS Thousands/uL 211 207 197   < > 240 219  --    SODIUM mmol/L 146* 145 146*   < > 146* 145 147*   POTASSIUM mmol/L 4 5 4 3 4 4   < > 4 3 4 5 4 4   CHLORIDE mmol/L 110* 110* 112*   < > 113* 114* 115*   CO2 mmol/L 24 21 18*   < > 18* 18* 16*   BUN mg/dL 75* 73* 75*   < > 71* 72* 75*   CREATININE mg/dL 4 38* 4 24* 4 50*   < > 3 93* 3 67* 3 76*   CALCIUM mg/dL 8 0* 7 9* 7 9*   < > 7 6* 7 5* 7 4* MAGNESIUM mg/dL  --   --  2 1  --   --   --  1 9   PHOSPHORUS mg/dL  --  3 9  --   --  5 4* 4 5* 5 6*   ALK PHOS U/L  --  94  --   --  89 78 82   ALT U/L  --  26  --   --  13 12 14   AST U/L  --  22  --   --  15 18 12    < > = values in this interval not displayed

## 2021-03-24 NOTE — ASSESSMENT & PLAN NOTE
Wt Readings from Last 3 Encounters:   03/23/21 120 kg (264 lb 12 8 oz)   02/21/20 117 kg (258 lb)   01/07/20 117 kg (258 lb 12 8 oz)     Patient admitted with increased lower extremity edema due to acute systolic CHF due to noncompliance with medications  Ejection fraction was 45-50% on this admission    Patient was treated with IV Bumex then IV diuretics were held because of worsening renal function    Lungs are clear to auscultation and oxygen saturation on room air is 94%    Diuretics are on hold due to acute kidney injury

## 2021-03-24 NOTE — ASSESSMENT & PLAN NOTE
Lab Results   Component Value Date    EGFR 10 03/23/2021    EGFR 10 03/22/2021    EGFR 10 03/21/2021    CREATININE 4 24 (H) 03/23/2021    CREATININE 4 50 (H) 03/22/2021    CREATININE 4 37 (H) 03/21/2021     Patient has stage IV chronic disease with baseline creatinine between 1 49-2  15  She presented with acute kidney injury  She has nephrotic range proteinuria    Nephrology was consulted    Patient's renal function is slightly better today compared to yesterday  Will monitor renal function closely

## 2021-03-24 NOTE — PROGRESS NOTES
[place]  Progress Note - Ashley Chanel 1955, 60 y o  female MRN: [id number]  Unit/Bed#: -01 Encounter: [id number]  Primary Care Provider: Karey Schneider DO   Date and time admitted to hospital: 3/15/2021  7:09 PM    * Acute on chronic combined systolic and diastolic congestive heart failure (HCC)  Assessment & Plan  Wt Readings from Last 3 Encounters:   03/23/21 120 kg (264 lb 12 8 oz)   02/21/20 117 kg (258 lb)   01/07/20 117 kg (258 lb 12 8 oz)     Patient admitted with increased lower extremity edema due to acute systolic CHF due to noncompliance with medications  Ejection fraction was 45-50% on this admission  Patient was treated with IV Bumex then IV diuretics were held because of worsening renal function    Lungs are clear to auscultation and oxygen saturation on room air is 94%    Diuretics are on hold due to acute kidney injury    Acute kidney injury superimposed on chronic kidney disease [facility])  Assessment & Plan  Lab Results   Component Value Date    EGFR 10 03/23/2021    EGFR 10 03/22/2021    EGFR 10 03/21/2021    CREATININE 4 24 (H) 03/23/2021    CREATININE 4 50 (H) 03/22/2021    CREATININE 4 37 (H) 03/21/2021     Patient has stage IV chronic disease with baseline creatinine between 1 49-2  15  She presented with acute kidney injury  She has nephrotic range proteinuria  Nephrology was consulted    Patient's renal function is slightly better today compared to yesterday  Will monitor renal function closely    Type 2 diabetes mellitus with stage 4 chronic kidney disease, with long-term current use of insulin [facility])  Assessment & Plan  Lab Results   Component Value Date    HGBA1C 7 6 (H) 03/15/2021       Recent Labs     03/23/21  1349 03/23/21  1410 03/23/21  1613 03/23/21  1756   POCGLU 31* 98 79 221*       Blood Sugar Average: Last 72 hrs:  · (P) 777 [id number]     Patient has type 2 diabetes on insulin with stage IV chronic disease      Patient was hypoglycemic today because she did not eat much lunch  I decreased Lantus 25 units at bedtime and will stop pre meal Humalog      VTE Prophylaxis: in place    Patient Centered Rounds: I rounded with patient's nurse    Current Length of Stay: 8 day(s)    Current Patient Status: Inpatient    Certification Statement: Pt requires additional inpatient hospital stay due to: see assessment and plan        Subjective:   Patient had a hypoglycemic event today and she admits to not eating breakfast or lunch much  She had dinner denies any nausea abdominal pain, chest, shortness of breath    All other ROS are negative    Objective:     Vitals:   Temp (24hrs), Av 8 °F (36 6 °C), Min:97 5 °F (36 4 °C), Max:98 °F (36 7 °C)    Temp:  [97 5 °F (36 4 °C)-98 °F (36 7 °C)] 97 8 °F (36 6 °C)  HR:  [63-68] 68  Resp:  [16-20] 20  BP: (146-166)/(49-63) 165/63  SpO2:  [89 %-94 %] 94 %  Body mass index is 45 45 kg/m²  Input and Output Summary (last 24 hours): Intake/Output Summary (Last 24 hours) at 3/23/2021 2045  Last data filed at 3/23/2021 1909  Gross per 24 hour   Intake 120 ml   Output 300 ml   Net -180 ml       Physical Exam:     Physical Exam  HENT:      Head: Normocephalic  Eyes:      Conjunctiva/sclera: Conjunctivae normal    Cardiovascular:      Rate and Rhythm: Normal rate and regular rhythm  Heart sounds: Murmur (3/6 systolic murmur is heard) present  Pulmonary:      Effort: No respiratory distress  Breath sounds: No wheezing or rales  Abdominal:      Palpations: Abdomen is soft  Tenderness: There is no abdominal tenderness  Skin:     General: Skin is warm and dry  Comments: Patient has 1+ pedal edema   Neurological:      Mental Status: She is alert and oriented to person, place, and time  Cranial Nerves: No cranial nerve deficit  I personally reviewed labs and imaging reports for today        Last 24 Hours Medication List:   Current Facility-Administered Medications   Medication Dose Route Frequency Provider Last Rate    acetaminophen  650 mg Oral Q6H PRN Reggie Delacruz PA-C      albumin human  12 5 g Intravenous BID Jose HOWARD Oropeza 12 5 g (03/23/21 1039)    [START ON 3/24/2021] amLODIPine  5 mg Oral Daily Jose HOWARD Oropeza      atorvastatin  40 mg Oral Daily Reggie Delacruz PA-C      calcium acetate  667 mg Oral TID With Meals HOWARD Stovall      carvedilol  25 mg Oral BID Jose HOWARD Oropeza      dextrose  25 mL Intravenous Once Melani Meade DO      diphenhydrAMINE  25 mg Oral Q6H PRN Reggie Delacruz PA-C      heparin (porcine)  5,000 Units Subcutaneous [facility] Reggie Delacruz PA-C      hydrALAZINE  75 mg Oral BID HOWARD Vyas      [START ON 3/24/2021] insulin glargine  25 Units Subcutaneous QAM Chaim Arias MD      insulin lispro  1-6 Units Subcutaneous TID AC Sarah Sanchez PA-C      insulin lispro  1-6 Units Subcutaneous HS Sarah Sanchez PA-C      ondansetron  4 mg Intravenous Q6H PRN Rhnoda Redmond MD      oxyCODONE  5 mg Oral Q6H PRN Rhonda Redmond MD      sodium bicarbonate  1,300 mg Oral 4x Daily HOWARD Puente            Today, Patient Was Seen By: Chaim Arias MD    ** Please Note: Dictation voice to text software may have been used in the creation of this document   **

## 2021-03-24 NOTE — PROGRESS NOTES
[place]  Progress Note - Maximiliano Carrion 1955, 60 y o  female MRN: [id number]  Unit/Bed#: -01 Encounter: [id number]  Primary Care Provider: Jadiel Oliva DO   Date and time admitted to hospital: 3/15/2021  7:09 PM    * Acute on chronic combined systolic and diastolic congestive heart failure (HCC)  Assessment & Plan  Wt Readings from Last 3 Encounters:   03/24/21 121 kg (266 lb 3 2 oz)   02/21/20 117 kg (258 lb)   01/07/20 117 kg (258 lb 12 8 oz)     Patient admitted with increased lower extremity edema due to acute systolic CHF due to noncompliance with medications  Ejection fraction was 45-50% on this admission  Patient was treated with IV Bumex then IV diuretics were held because of worsening renal function    Lungs are clear to auscultation but patient has persistent lower extremity edema    Diuretics are on hold due to acute kidney injury    I called patient's daughter at the phone number on the chart but no one answered the phone and I could not leave a message on March 24th    Acute kidney injury superimposed on chronic kidney disease [facility])  Assessment & Plan  Lab Results   Component Value Date    EGFR 10 03/24/2021    EGFR 10 03/23/2021    EGFR 10 03/22/2021    CREATININE 4 38 (H) 03/24/2021    CREATININE 4 24 (H) 03/23/2021    CREATININE 4 50 (H) 03/22/2021     Patient has stage IV chronic disease with baseline creatinine between 1 49-2  15  She presented with acute kidney injury  She has nephrotic range proteinuria  Nephrology was consulted    Patient's renal function is worse today than yesterday  Plan is for kidney biopsy tomorrow and depending on results will likely start hemodialysis this week      I discussed the case with Dr Xavi Ford    Type 2 diabetes mellitus with stage 4 chronic kidney disease, with long-term current use of insulin [facility])  Assessment & Plan  Lab Results   Component Value Date    HGBA1C 7 6 (H) 03/15/2021       Recent Labs 21  2328 21  0746 21  1031 21  1639   POCGLU 204* 123 161* 153*       Blood Sugar Average: Last 72 hrs:  · (P) 120 [id number]     Patient has type 2 diabetes on insulin with stage IV chronic disease  Patient has no hypoglycemic episodes, will continue Lantus and sliding scale insulin      VTE Prophylaxis: in place    Patient Centered Rounds: I rounded with patient's nurse    Current Length of Stay: 9 day(s)    Current Patient Status: Inpatient    Certification Statement: Pt requires additional inpatient hospital stay due to: see assessment and plan        Subjective:   Patient is nervous about possibility of starting hemodialysis  Denies chest pain, shortness of breath abdominal pain    All other ROS are negative    Objective:     Vitals:   Temp (24hrs), Av 8 °F (36 6 °C), Min:97 7 °F (36 5 °C), Max:97 8 °F (36 6 °C)    Temp:  [97 7 °F (36 5 °C)-97 8 °F (36 6 °C)] 97 8 °F (36 6 °C)  HR:  [65-66] 66  Resp:  [18-24] 18  BP: (156-163)/(62-65) 156/65  SpO2:  [87 %-90 %] 90 %  Body mass index is 45 69 kg/m²  Input and Output Summary (last 24 hours): Intake/Output Summary (Last 24 hours) at 3/24/2021 1721  Last data filed at 3/24/2021 1245  Gross per 24 hour   Intake 240 ml   Output 900 ml   Net -660 ml       Physical Exam:     Physical Exam  Constitutional:       Appearance: Normal appearance  Cardiovascular:      Rate and Rhythm: Normal rate and regular rhythm  Heart sounds: Murmur (Systolic ejection murmur present) present  Pulmonary:      Effort: No respiratory distress  Breath sounds: No wheezing or rales  Abdominal:      General: Bowel sounds are normal       Palpations: Abdomen is soft  Tenderness: There is no abdominal tenderness  Skin:     General: Skin is warm and dry  Comments: Pedal Edema was present   Neurological:      Mental Status: She is alert and oriented to person, place, and time  Mental status is at baseline               I personally reviewed labs and imaging reports for today  Last 24 Hours Medication List:   Current Facility-Administered Medications   Medication Dose Route Frequency Provider Last Rate    acetaminophen  650 mg Oral Q6H PRN Rylie Brizuela, PA-C      albumin human  12 5 g Intravenous BID CHARLY BrodyNP 12 5 g (03/23/21 1039)    amLODIPine  5 mg Oral Daily Giuliana Bajwa, CRLOS      atorvastatin  40 mg Oral Daily Rylie Brizuela, PA-C      calcium acetate  667 mg Oral TID With Meals HOWARD Walsh      carvedilol  25 mg Oral BID Kathyrandrew Justiceoms, CRNP      dextrose  25 mL Intravenous Once Hemant Desanctis, DO      diphenhydrAMINE  25 mg Oral Q6H PRN Rylie Brizuela, PA-C      heparin (porcine)  5,000 Units Subcutaneous [facility] Rylie Brizuela, PA-C      hydrALAZINE  75 mg Oral BID HOWARD Brody      insulin glargine  25 Units Subcutaneous QAM Albert Farley MD      insulin lispro  1-6 Units Subcutaneous TID AC Rylie Brizuela, PA-C      insulin lispro  1-6 Units Subcutaneous HS Rylie Brizuela, PA-C      iron sucrose  200 mg Intravenous Daily Win Phillips, DO      ondansetron  4 mg Intravenous Q6H PRN Shikha Faulkner MD      oxyCODONE  5 mg Oral Q6H PRN Shikha Faulkner MD      sodium bicarbonate  1,300 mg Oral 4x Daily HOWARD Puente      sodium chloride  75 mL/hr Intravenous Continuous Nisreen Kill, DO 75 mL/hr (03/24/21 1647)          Today, Patient Was Seen By: Albert Farley MD    ** Please Note: Dictation voice to text software may have been used in the creation of this document   **

## 2021-03-24 NOTE — PLAN OF CARE
Problem: Potential for Falls  Goal: Patient will remain free of falls  Description: INTERVENTIONS:  - Assess patient frequently for physical needs  -  Identify cognitive and physical deficits and behaviors that affect risk of falls    -  [place] fall precautions as indicated by assessment   - Educate patient/family on patient safety including physical limitations  - Instruct patient to call for assistance with activity based on assessment  - Modify environment to reduce risk of injury  - Consider OT/PT consult to assist with strengthening/mobility  Outcome: Progressing     Problem: PAIN - ADULT  Goal: Verbalizes/displays adequate comfort level or baseline comfort level  Description: Interventions:  - Encourage patient to monitor pain and request assistance  - Assess pain using appropriate pain scale  - Administer analgesics based on type and severity of pain and evaluate response  - Implement non-pharmacological measures as appropriate and evaluate response  - Consider cultural and social influences on pain and pain management  - Notify physician/advanced practitioner if interventions unsuccessful or patient reports new pain  Outcome: Progressing     Problem: INFECTION - ADULT  Goal: Absence or prevention of progression during hospitalization  Description: INTERVENTIONS:  - Assess and monitor for signs and symptoms of infection  - Monitor lab/diagnostic results  - Monitor all insertion sites, i e  indwelling lines, tubes, and drains  - Monitor endotracheal if appropriate and nasal secretions for changes in amount and color  - [place] appropriate cooling/warming therapies per order  - Administer medications as ordered  - Instruct and encourage patient and family to use good hand hygiene technique  - Identify and instruct in appropriate isolation precautions for identified infection/condition  Outcome: Progressing  Goal: Absence of fever/infection during neutropenic period  Description: INTERVENTIONS:  - Monitor WBC    Outcome: Progressing     Problem: SAFETY ADULT  Goal: Patient will remain free of falls  Description: INTERVENTIONS:  - Assess patient frequently for physical needs  -  Identify cognitive and physical deficits and behaviors that affect risk of falls    -  [place] fall precautions as indicated by assessment   - Educate patient/family on patient safety including physical limitations  - Instruct patient to call for assistance with activity based on assessment  - Modify environment to reduce risk of injury  - Consider OT/PT consult to assist with strengthening/mobility  Outcome: Progressing  Goal: Maintain or return to baseline ADL function  Description: INTERVENTIONS:  -  Assess patient's ability to carry out ADLs; assess patient's baseline for ADL function and identify physical deficits which impact ability to perform ADLs (bathing, care of mouth/teeth, toileting, grooming, dressing, etc )  - Assess/evaluate cause of self-care deficits   - Assess range of motion  - Assess patient's mobility; develop plan if impaired  - Assess patient's need for assistive devices and provide as appropriate  - Encourage maximum independence but intervene and supervise when necessary  - Involve family in performance of ADLs  - Assess for home care needs following discharge   - Consider OT consult to assist with ADL evaluation and planning for discharge  - Provide patient education as appropriate  Outcome: Progressing  Goal: Maintain or return mobility status to optimal level  Description: INTERVENTIONS:  - Assess patient's baseline mobility status (ambulation, transfers, stairs, etc )    - Identify cognitive and physical deficits and behaviors that affect mobility  - Identify mobility aids required to assist with transfers and/or ambulation (gait belt, sit-to-stand, lift, walker, cane, etc )  - [place] fall precautions as indicated by assessment  - Record patient progress and toleration of activity level on Mobility SBAR; progress patient to next Phase/Stage  - Instruct patient to call for assistance with activity based on assessment  - Consider rehabilitation consult to assist with strengthening/weightbearing, etc   Outcome: Progressing     Problem: DISCHARGE PLANNING  Goal: Discharge to home or other facility with appropriate resources  Description: INTERVENTIONS:  - Identify barriers to discharge w/patient and caregiver  - Arrange for needed discharge resources and transportation as appropriate  - Identify discharge learning needs (meds, wound care, etc )  - Arrange for interpretive services to assist at discharge as needed  - Refer to Case Management Department for coordinating discharge planning if the patient needs post-hospital services based on physician/advanced practitioner order or complex needs related to functional status, cognitive ability, or social support system  Outcome: Progressing     Problem: Knowledge Deficit  Goal: Patient/family/caregiver demonstrates understanding of disease process, treatment plan, medications, and discharge instructions  Description: Complete learning assessment and assess knowledge base    Interventions:  - Provide teaching at level of understanding  - Provide teaching via preferred learning methods  Outcome: Progressing     Problem: CARDIOVASCULAR - ADULT  Goal: Maintains optimal cardiac output and hemodynamic stability  Description: INTERVENTIONS:  - Monitor I/O, vital signs and rhythm  - Monitor for S/S and trends of decreased cardiac output  - Administer and titrate ordered vasoactive medications to optimize hemodynamic stability  - Assess quality of pulses, skin color and temperature  - Assess for signs of decreased coronary artery perfusion  - Instruct patient to report change in severity of symptoms  Outcome: Progressing  Goal: Absence of cardiac dysrhythmias or at baseline rhythm  Description: INTERVENTIONS:  - Continuous cardiac monitoring, vital signs, obtain 12 lead EKG if ordered  - Administer antiarrhythmic and heart rate control medications as ordered  - Monitor electrolytes and administer replacement therapy as ordered  Outcome: Progressing     Problem: SKIN/TISSUE INTEGRITY - ADULT  Goal: Skin integrity remains intact  Description: INTERVENTIONS  - Identify patients at risk for skin breakdown  - Assess and monitor skin integrity  - Assess and monitor nutrition and hydration status  - Monitor labs (i e  albumin)  - Assess for incontinence   - Turn and reposition patient  - Assist with mobility/ambulation  - Relieve pressure over bony prominences  - Avoid friction and shearing  - Provide appropriate hygiene as needed including keeping skin clean and dry  - Evaluate need for skin moisturizer/barrier cream  - Collaborate with interdisciplinary team (i e  Nutrition, Rehabilitation, etc )   - Patient/family teaching  Outcome: Progressing  Goal: Incision(s), wounds(s) or drain site(s) healing without S/S of infection  Description: INTERVENTIONS  - Assess and document risk factors for skin impairment   - Assess and document dressing, incision, wound bed, drain sites and surrounding tissue  - Consider nutrition services referral as needed  - Oral mucous membranes remain intact  - Provide patient/ family education  Outcome: Progressing  Goal: Oral mucous membranes remain intact  Description: INTERVENTIONS  - Assess oral mucosa and hygiene practices  - Implement preventative oral hygiene regimen  - Implement oral medicated treatments as ordered  - Initiate Nutrition services referral as needed  Outcome: Progressing     Problem: Prexisting or High Potential for Compromised Skin Integrity  Goal: Skin integrity is maintained or improved  Description: INTERVENTIONS:  - Identify patients at risk for skin breakdown  - Assess and monitor skin integrity  - Assess and monitor nutrition and hydration status  - Monitor labs   - Assess for incontinence   - Turn and reposition patient  - Assist with mobility/ambulation  - Relieve pressure over bony prominences  - Avoid friction and shearing  - Provide appropriate hygiene as needed including keeping skin clean and dry  - Evaluate need for skin moisturizer/barrier cream  - Collaborate with interdisciplinary team   - Patient/family teaching  - Consider wound care consult   Outcome: Progressing     Problem: Nutrition/Hydration-ADULT  Goal: Nutrient/Hydration intake appropriate for improving, restoring or maintaining nutritional needs  Description: Monitor and assess patient's nutrition/hydration status for malnutrition  Collaborate with interdisciplinary team and initiate plan and interventions as ordered  Monitor patient's weight and dietary intake as ordered or per policy  Utilize nutrition screening tool and intervene as necessary  Determine patient's food preferences and provide high-protein, high-caloric foods as appropriate       INTERVENTIONS:  - Monitor oral intake, urinary output, labs, and treatment plans  - Assess nutrition and hydration status and recommend course of action  - Evaluate amount of meals eaten  - Assist patient with eating if necessary   - Allow adequate time for meals  - Recommend/ encourage appropriate diets, oral nutritional supplements, and vitamin/mineral supplements  - Order, calculate, and assess calorie counts as needed  - Recommend, monitor, and adjust tube feedings and TPN/PPN based on assessed needs  - Assess need for intravenous fluids  - Provide specific nutrition/hydration education as appropriate  - Include patient/family/caregiver in decisions related to nutrition  Outcome: Progressing     Problem: RESPIRATORY - ADULT  Goal: Achieves optimal ventilation and oxygenation  Description: INTERVENTIONS:  - Assess for changes in respiratory status  - Assess for changes in mentation and behavior  - Position to facilitate oxygenation and minimize respiratory effort  - Oxygen administered by appropriate delivery if ordered  - Initiate smoking cessation education as indicated  - Encourage broncho-pulmonary hygiene including cough, deep breathe, Incentive Spirometry  - Assess the need for suctioning and aspirate as needed  - Assess and instruct to report SOB or any respiratory difficulty  - Respiratory Therapy support as indicated  Outcome: Progressing     Problem: METABOLIC, FLUID AND ELECTROLYTES - ADULT  Goal: Electrolytes maintained within normal limits  Description: INTERVENTIONS:  - Monitor labs and assess patient for signs and symptoms of electrolyte imbalances  - Administer electrolyte replacement as ordered  - Monitor response to electrolyte replacements, including repeat lab results as appropriate  - Instruct patient on fluid and nutrition as appropriate  Outcome: Progressing  Goal: Fluid balance maintained  Description: INTERVENTIONS:  - Monitor labs   - Monitor I/O and WT  - Instruct patient on fluid and nutrition as appropriate  - Assess for signs & symptoms of volume excess or deficit  Outcome: Progressing  Goal: Glucose maintained within target range  Description: INTERVENTIONS:  - Monitor Blood Glucose as ordered  - Assess for signs and symptoms of hyperglycemia and hypoglycemia  - Administer ordered medications to maintain glucose within target range  - Assess nutritional intake and initiate nutrition service referral as needed  Outcome: Progressing     Problem: HEMATOLOGIC - ADULT  Goal: Maintains hematologic stability  Description: INTERVENTIONS  - Assess for signs and symptoms of bleeding or hemorrhage  - Monitor labs  - Administer supportive blood products/factors as ordered and appropriate  Outcome: Progressing     Problem: MUSCULOSKELETAL - ADULT  Goal: Maintain or return mobility to safest level of function  Description: INTERVENTIONS:  - Assess patient's ability to carry out ADLs; assess patient's baseline for ADL function and identify physical deficits which impact ability to perform ADLs (bathing, care of mouth/teeth, toileting, grooming, dressing, etc )  - Assess/evaluate cause of self-care deficits   - Assess range of motion  - Assess patient's mobility  - Assess patient's need for assistive devices and provide as appropriate  - Encourage maximum independence but intervene and supervise when necessary  - Involve family in performance of ADLs  - Assess for home care needs following discharge   - Consider OT consult to assist with ADL evaluation and planning for discharge  - Provide patient education as appropriate  Outcome: Progressing

## 2021-03-24 NOTE — PROGRESS NOTES
Progress Note - Podiatry  Loly Jennifer 60 y o  female MRN: [id number]  Unit/Bed#: -01 Encounter: [id number]    Assessment/Plan:    1  Skin ulceration left heel partial depth with DM2   - Continue with Maxorb Ag every other day  - Patient should follow-up at [address]  upon discharge  2   Cellulitis bilateral legs              -secondary to #3                -diminishing erythema and edema bilateral resolving satisfactorily  3  Venous hypertension with ulceration and inflammation bilateral legs   -nursing advised to encourage elevation of extremities and to avoid dependence at all times  -multiple wounds bilateral legs  No strike through drainage noted today              -continue with Xeroform, ABDs, Kerlix, and six-inch Ace wraps QOD   -dressing changes by nursing staff  4  Noncompliance with medication, anemia, ADI, essential hypertension, morbid obesity, DM2              -managed per Internal Medicine and Nephrology      Subjective/Objective   Chief Complaint:   Chief Complaint   Patient presents with    Foot Pain     pt c/o left heel pain  pt has had pain for few days  pt also has ulcers on top of left foot  Subjective:  27-year-old white female resting comfortably in bedside sleeping with legs dependent  Denies any new pain or discomfort from legs  Anxious about having kidney biopsy tomorrow  Relates legs feel much better overall than upon admission  Denies any fever shortness of breath  Blood pressure 156/65, pulse 66, temperature 97 8 °F (36 6 °C), resp  rate 18, height 5' 4" (1 626 m), weight 121 kg (266 lb 3 2 oz), SpO2 90 %, not currently breastfeeding  ,Body mass index is 45 69 kg/m²      Invasive Devices     Peripheral Intravenous Line            Peripheral IV 03/22/21 Right Hand 2 days                Physical Exam:   General appearance: alert, cooperative and no distress  Neuro/Vascular: Normal strength  Sensation and reflexes:  Diminished epicritic sensation  Pulses: Right: DP 0/4, PT 0/4, Left: DP 0/4, PT 0/4  Capillary Filling:  3 Sec, Edema:  + 2 - +3 edema bilateral  Extremity: Ulcers/Wounds:   · Left heel:  Partial depth ulcerative breakdown posterior lateral aspect, minimal serosanguineous drainage, evidence of old DD removed blister, 50% yellow, 50% pink/red, no evidence of acute infection  Pain with palpation  · Bilateral legs:  Multiple partial depth venous hypertension ulcerations with diminishing serosanguineous drainage  Erythema appears be resolving with less calor  Wounds granulating well  Overall legs are improving  Edema has also reduced in both legs  Labs and other studies:   CBC w/diff  Results from last 7 days   Lab Units 03/24/21  0540  03/22/21  0417   WBC Thousand/uL 8 92   < > 9 79   HEMOGLOBIN g/dL 7 6*   < > 7 3*   HEMATOCRIT % 25 6*   < > 24 4*   PLATELETS Thousands/uL 211   < > 197   NEUTROS PCT %  --   --  68   LYMPHS PCT %  --   --  9*   MONOS PCT %  --   --  8   EOS PCT %  --   --  15*    < > = values in this interval not displayed  BMP  Results from last 7 days   Lab Units 03/24/21  0540   POTASSIUM mmol/L 4 5   CHLORIDE mmol/L 110*   CO2 mmol/L 24   BUN mg/dL 75*   CREATININE mg/dL 4 38*   CALCIUM mg/dL 8 0*     CMP  Results from last 7 days   Lab Units 03/24/21  0540 03/23/21  0518   POTASSIUM mmol/L 4 5 4 3   CHLORIDE mmol/L 110* 110*   CO2 mmol/L 24 21   BUN mg/dL 75* 73*   CREATININE mg/dL 4 38* 4 24*   CALCIUM mg/dL 8 0* 7 9*   ALK PHOS U/L  --  94   ALT U/L  --  26   AST U/L  --  22       @Culture@  Lab Results   Component Value Date    BLOODCX No Growth After 5 Days  03/15/2021    BLOODCX No Growth After 5 Days   03/15/2021    URINECX <10,000 cfu/ml  03/16/2021     No results found for: WOUNDCULT      Current Facility-Administered Medications:     acetaminophen (TYLENOL) tablet 650 mg, 650 mg, Oral, Q6H PRN, Sarah Sanchez PA-C, 650 mg at 03/17/21 2320    albumin human (FLEXBUMIN) 25 % injection 12 5 g, 12 5 g, Intravenous, BID, Ling Carrera, CRNP, Last Rate: 2 mL/hr at 03/23/21 1039, 12 5 g at 03/24/21 0811    amLODIPine (NORVASC) tablet 5 mg, 5 mg, Oral, Daily, Ling Carmende, CRNP, 5 mg at 03/24/21 2155    atorvastatin (LIPITOR) tablet 40 mg, 40 mg, Oral, Daily, Austin Ryan PA-C, 40 mg at 03/24/21 8432    calcium acetate (PHOSLO) capsule 667 mg, 667 mg, Oral, TID With Meals, Skye Ramsay, CHARLYNP, 667 mg at 03/24/21 1712    carvedilol (COREG) tablet 25 mg, 25 mg, Oral, BID, Ling Carrera CRNP, 25 mg at 03/24/21 1712    dextrose 50 % IV solution 25 mL, 25 mL, Intravenous, Once, Tommy Mckeon DO    diphenhydrAMINE (BENADRYL) tablet 25 mg, 25 mg, Oral, Q6H PRN, Austin Ryan PA-C, 25 mg at 03/17/21 0545    heparin (porcine) subcutaneous injection 5,000 Units, 5,000 Units, Subcutaneous, Q8H [address], 5,000 Units at 03/24/21 1415 **AND** [COMPLETED] Platelet count, , , Once, Austin Ryan PA-C    hydrALAZINE (APRESOLINE) tablet 75 mg, 75 mg, Oral, BID, Ling Carmende, CRNP, 75 mg at 03/24/21 0811    insulin glargine (LANTUS) subcutaneous injection 25 Units 0 25 mL, 25 Units, Subcutaneous, QAMJag MD, 25 Units at 03/24/21 0825    insulin lispro (HumaLOG) 100 units/mL subcutaneous injection 1-6 Units, 1-6 Units, Subcutaneous, TID AC, 1 Units at 03/24/21 1711 **AND** Fingerstick Glucose (POCT), , , TID AC, Sarah Sanchez PA-C    insulin lispro (HumaLOG) 100 units/mL subcutaneous injection 1-6 Units, 1-6 Units, Subcutaneous, HS, Sarah Sanchez PA-C, 2 Units at 03/23/21 2200    iron sucrose (VENOFER) 200 mg in sodium chloride 0 9% 100 ml IVPB 200 mg, 200 mg, Intravenous, Daily, Win Phillips DO, 200 mg at 03/24/21 1257    ondansetron (ZOFRAN) injection 4 mg, 4 mg, Intravenous, Q6H PRN, Wale Parker MD, 4 mg at 03/19/21 1350    oxyCODONE (ROXICODONE) IR tablet 5 mg, 5 mg, Oral, Q6H PRN, Wale Parker MD    sodium bicarbonate tablet 1,300 mg, 1,300 mg, Oral, 4x Daily, HOWARD Puente, 1,300 mg at 03/24/21 1712    sodium chloride 0 9 % infusion, 75 mL/hr, Intravenous, Continuous, Barbra Chandler DO, Last Rate: 75 mL/hr at 03/24/21 1647, 75 mL/hr at 03/24/21 1647    Imaging: I have personally reviewed pertinent films in PACS  EKG, Pathology, and Other Studies: I have personally reviewed pertinent reports    VTE Pharmacologic Prophylaxis: Heparin  VTE Mechanical Prophylaxis: reason for no mechanical VTE prophylaxis Bilateral leg wounds    Nolon Adjutant, DPTEODORA

## 2021-03-24 NOTE — ASSESSMENT & PLAN NOTE
Lab Results   Component Value Date    EGFR 10 03/24/2021    EGFR 10 03/23/2021    EGFR 10 03/22/2021    CREATININE 4 38 (H) 03/24/2021    CREATININE 4 24 (H) 03/23/2021    CREATININE 4 50 (H) 03/22/2021     Patient has stage IV chronic disease with baseline creatinine between 1 49-2  15  She presented with acute kidney injury  She has nephrotic range proteinuria  Nephrology was consulted    Patient's renal function is worse today than yesterday  Plan is for kidney biopsy tomorrow and depending on results will likely start hemodialysis this week      I discussed the case with Dr Priscila Jaeger

## 2021-03-24 NOTE — CASE MANAGEMENT
Continue to follow  [facility] can accept Pt for VNA/home PT/OT services  Acknowledge Pt may need outpt dialysis upon discharge  CM to follow

## 2021-03-25 ENCOUNTER — APPOINTMENT (INPATIENT)
Dept: CT IMAGING | Facility: HOSPITAL | Age: 66
DRG: 698 | End: 2021-03-25
Attending: RADIOLOGY
Payer: COMMERCIAL

## 2021-03-25 LAB
ANION GAP SERPL CALCULATED.3IONS-SCNC: 11 MMOL/L (ref 4–13)
BASOPHILS # BLD AUTO: 0.05 THOUSANDS/ΜL (ref 0–0.1)
BASOPHILS NFR BLD AUTO: 1 % (ref 0–1)
BUN SERPL-MCNC: 77 MG/DL (ref 5–25)
CALCIUM SERPL-MCNC: 8 MG/DL (ref 8.3–10.1)
CHLORIDE SERPL-SCNC: 110 MMOL/L (ref 100–108)
CO2 SERPL-SCNC: 25 MMOL/L (ref 21–32)
CREAT SERPL-MCNC: 4.1 MG/DL (ref 0.6–1.3)
EOSINOPHIL # BLD AUTO: 0.94 THOUSAND/ΜL (ref 0–0.61)
EOSINOPHIL NFR BLD AUTO: 9 % (ref 0–6)
ERYTHROCYTE [DISTWIDTH] IN BLOOD BY AUTOMATED COUNT: 16.6 % (ref 11.6–15.1)
GFR SERPL CREATININE-BSD FRML MDRD: 11 ML/MIN/1.73SQ M
GLUCOSE SERPL-MCNC: 116 MG/DL (ref 65–140)
GLUCOSE SERPL-MCNC: 117 MG/DL (ref 65–140)
GLUCOSE SERPL-MCNC: 150 MG/DL (ref 65–140)
GLUCOSE SERPL-MCNC: 158 MG/DL (ref 65–140)
GLUCOSE SERPL-MCNC: 80 MG/DL (ref 65–140)
GLUCOSE SERPL-MCNC: 87 MG/DL (ref 65–140)
HCT VFR BLD AUTO: 24.2 % (ref 34.8–46.1)
HGB BLD-MCNC: 7.1 G/DL (ref 11.5–15.4)
IMM GRANULOCYTES # BLD AUTO: 0.17 THOUSAND/UL (ref 0–0.2)
IMM GRANULOCYTES NFR BLD AUTO: 2 % (ref 0–2)
INR PPP: 1.22 (ref 0.84–1.19)
LYMPHOCYTES # BLD AUTO: 1.11 THOUSANDS/ΜL (ref 0.6–4.47)
LYMPHOCYTES NFR BLD AUTO: 11 % (ref 14–44)
MCH RBC QN AUTO: 27.7 PG (ref 26.8–34.3)
MCHC RBC AUTO-ENTMCNC: 29.3 G/DL (ref 31.4–37.4)
MCV RBC AUTO: 95 FL (ref 82–98)
MONOCYTES # BLD AUTO: 0.74 THOUSAND/ΜL (ref 0.17–1.22)
MONOCYTES NFR BLD AUTO: 7 % (ref 4–12)
NEUTROPHILS # BLD AUTO: 7.25 THOUSANDS/ΜL (ref 1.85–7.62)
NEUTS SEG NFR BLD AUTO: 70 % (ref 43–75)
NRBC BLD AUTO-RTO: 0 /100 WBCS
PHOSPHATE SERPL-MCNC: 3.7 MG/DL (ref 2.3–4.1)
PLATELET # BLD AUTO: 215 THOUSANDS/UL (ref 149–390)
PMV BLD AUTO: 10.7 FL (ref 8.9–12.7)
POTASSIUM SERPL-SCNC: 4.7 MMOL/L (ref 3.5–5.3)
PROTHROMBIN TIME: 15.4 SECONDS (ref 11.6–14.5)
RBC # BLD AUTO: 2.56 MILLION/UL (ref 3.81–5.12)
SODIUM SERPL-SCNC: 146 MMOL/L (ref 136–145)
WBC # BLD AUTO: 10.26 THOUSAND/UL (ref 4.31–10.16)

## 2021-03-25 PROCEDURE — 88300 SURGICAL PATH GROSS: CPT | Performed by: PATHOLOGY

## 2021-03-25 PROCEDURE — 77012 CT SCAN FOR NEEDLE BIOPSY: CPT

## 2021-03-25 PROCEDURE — 88313 SPECIAL STAINS GROUP 2: CPT

## 2021-03-25 PROCEDURE — 88346 IMFLUOR 1ST 1ANTB STAIN PX: CPT

## 2021-03-25 PROCEDURE — 88350 IMFLUOR EA ADDL 1ANTB STN PX: CPT

## 2021-03-25 PROCEDURE — 99232 SBSQ HOSP IP/OBS MODERATE 35: CPT | Performed by: INTERNAL MEDICINE

## 2021-03-25 PROCEDURE — 99153 MOD SED SAME PHYS/QHP EA: CPT

## 2021-03-25 PROCEDURE — 88348 ELECTRON MICROSCOPY DX: CPT

## 2021-03-25 PROCEDURE — 84100 ASSAY OF PHOSPHORUS: CPT | Performed by: INTERNAL MEDICINE

## 2021-03-25 PROCEDURE — 88305 TISSUE EXAM BY PATHOLOGIST: CPT

## 2021-03-25 PROCEDURE — 82948 REAGENT STRIP/BLOOD GLUCOSE: CPT

## 2021-03-25 PROCEDURE — 80048 BASIC METABOLIC PNL TOTAL CA: CPT | Performed by: INTERNAL MEDICINE

## 2021-03-25 PROCEDURE — 0TB13ZX EXCISION OF LEFT KIDNEY, PERCUTANEOUS APPROACH, DIAGNOSTIC: ICD-10-PCS | Performed by: RADIOLOGY

## 2021-03-25 PROCEDURE — 50200 RENAL BIOPSY PERQ: CPT

## 2021-03-25 PROCEDURE — 99152 MOD SED SAME PHYS/QHP 5/>YRS: CPT

## 2021-03-25 PROCEDURE — 85025 COMPLETE CBC W/AUTO DIFF WBC: CPT | Performed by: INTERNAL MEDICINE

## 2021-03-25 PROCEDURE — 85610 PROTHROMBIN TIME: CPT | Performed by: RADIOLOGY

## 2021-03-25 RX ORDER — FENTANYL CITRATE 50 UG/ML
INJECTION, SOLUTION INTRAMUSCULAR; INTRAVENOUS CODE/TRAUMA/SEDATION MEDICATION
Status: COMPLETED | OUTPATIENT
Start: 2021-03-25 | End: 2021-03-25

## 2021-03-25 RX ORDER — MIDAZOLAM HYDROCHLORIDE 2 MG/2ML
INJECTION, SOLUTION INTRAMUSCULAR; INTRAVENOUS CODE/TRAUMA/SEDATION MEDICATION
Status: COMPLETED | OUTPATIENT
Start: 2021-03-25 | End: 2021-03-25

## 2021-03-25 RX ADMIN — CARVEDILOL 25 MG: 12.5 TABLET, FILM COATED ORAL at 08:34

## 2021-03-25 RX ADMIN — FENTANYL CITRATE 25 MCG: 50 INJECTION, SOLUTION INTRAMUSCULAR; INTRAVENOUS at 10:14

## 2021-03-25 RX ADMIN — HEPARIN SODIUM 5000 UNITS: 5000 INJECTION INTRAVENOUS; SUBCUTANEOUS at 15:39

## 2021-03-25 RX ADMIN — MIDAZOLAM 0.5 MG: 1 INJECTION INTRAMUSCULAR; INTRAVENOUS at 10:08

## 2021-03-25 RX ADMIN — MIDAZOLAM 1 MG: 1 INJECTION INTRAMUSCULAR; INTRAVENOUS at 10:03

## 2021-03-25 RX ADMIN — FENTANYL CITRATE 25 MCG: 50 INJECTION, SOLUTION INTRAMUSCULAR; INTRAVENOUS at 10:20

## 2021-03-25 RX ADMIN — SODIUM BICARBONATE 650 MG TABLET 1300 MG: at 08:33

## 2021-03-25 RX ADMIN — MIDAZOLAM 0.5 MG: 1 INJECTION INTRAMUSCULAR; INTRAVENOUS at 10:13

## 2021-03-25 RX ADMIN — CALCIUM ACETATE 667 MG: 667 CAPSULE ORAL at 12:02

## 2021-03-25 RX ADMIN — CARVEDILOL 25 MG: 12.5 TABLET, FILM COATED ORAL at 17:30

## 2021-03-25 RX ADMIN — SODIUM CHLORIDE 75 ML/HR: 0.9 INJECTION, SOLUTION INTRAVENOUS at 05:42

## 2021-03-25 RX ADMIN — FENTANYL CITRATE 25 MCG: 50 INJECTION, SOLUTION INTRAMUSCULAR; INTRAVENOUS at 10:35

## 2021-03-25 RX ADMIN — DEXTROSE 500 ML: 5 SOLUTION INTRAVENOUS at 12:24

## 2021-03-25 RX ADMIN — FENTANYL CITRATE 25 MCG: 50 INJECTION, SOLUTION INTRAMUSCULAR; INTRAVENOUS at 10:08

## 2021-03-25 RX ADMIN — ALBUMIN (HUMAN) 12.5 G: 0.25 INJECTION, SOLUTION INTRAVENOUS at 22:40

## 2021-03-25 RX ADMIN — CALCIUM ACETATE 667 MG: 667 CAPSULE ORAL at 08:33

## 2021-03-25 RX ADMIN — FENTANYL CITRATE 25 MCG: 50 INJECTION, SOLUTION INTRAMUSCULAR; INTRAVENOUS at 10:22

## 2021-03-25 RX ADMIN — INSULIN GLARGINE 25 UNITS: 100 INJECTION, SOLUTION SUBCUTANEOUS at 08:33

## 2021-03-25 RX ADMIN — SODIUM BICARBONATE 650 MG TABLET 1300 MG: at 17:31

## 2021-03-25 RX ADMIN — FENTANYL CITRATE 50 MCG: 50 INJECTION, SOLUTION INTRAMUSCULAR; INTRAVENOUS at 10:03

## 2021-03-25 RX ADMIN — HYDRALAZINE HYDROCHLORIDE 75 MG: 25 TABLET, FILM COATED ORAL at 08:34

## 2021-03-25 RX ADMIN — IRON SUCROSE 200 MG: 20 INJECTION, SOLUTION INTRAVENOUS at 08:42

## 2021-03-25 RX ADMIN — CALCIUM ACETATE 667 MG: 667 CAPSULE ORAL at 17:31

## 2021-03-25 RX ADMIN — SODIUM BICARBONATE 650 MG TABLET 1300 MG: at 12:02

## 2021-03-25 RX ADMIN — ATORVASTATIN CALCIUM 40 MG: 40 TABLET, FILM COATED ORAL at 08:35

## 2021-03-25 RX ADMIN — HEPARIN SODIUM 5000 UNITS: 5000 INJECTION INTRAVENOUS; SUBCUTANEOUS at 22:40

## 2021-03-25 RX ADMIN — INSULIN LISPRO 1 UNITS: 100 INJECTION, SOLUTION INTRAVENOUS; SUBCUTANEOUS at 17:32

## 2021-03-25 RX ADMIN — SODIUM BICARBONATE 650 MG TABLET 1300 MG: at 22:25

## 2021-03-25 RX ADMIN — ONDANSETRON 4 MG: 2 INJECTION INTRAMUSCULAR; INTRAVENOUS at 12:21

## 2021-03-25 RX ADMIN — HYDRALAZINE HYDROCHLORIDE 75 MG: 25 TABLET, FILM COATED ORAL at 22:25

## 2021-03-25 RX ADMIN — AMLODIPINE BESYLATE 5 MG: 5 TABLET ORAL at 08:34

## 2021-03-25 RX ADMIN — ALBUMIN (HUMAN) 12.5 G: 0.25 INJECTION, SOLUTION INTRAVENOUS at 08:35

## 2021-03-25 NOTE — BRIEF OP NOTE (RAD/CATH)
INTERVENTIONAL RADIOLOGY PROCEDURE NOTE    Date: 3/25/2021    Procedure:  Kidney biopsy    Preoperative diagnosis:   1  Pain of left heel    2  Cellulitis of left foot    3  CHF (congestive heart failure) ([place] [address] )    4  Hyperglycemia    5  CKD (chronic kidney disease)    6  Acute on chronic congestive heart failure, unspecified heart failure type ([place] [address] )    7  Stage 4 chronic kidney disease ([place] [address] )    8  Type 2 diabetes mellitus with hyperglycemia, with long-term current use of insulin (HCC)    9  Localized edema         Postoperative diagnosis: Same  Surgeon: Toro Kelly MD     Assistant: None  No qualified resident was available  Blood loss:  Minimal    Specimens:  Left kidney core biopsy     Findings:  Under CT guidance multiple 18 gauge core biopsy specimens obtained and given to pathologist which was deemed adequate  Gel-Foam embolization of the tract performed  No bleeding postprocedure  Complications: None immediate      Anesthesia: conscious sedation

## 2021-03-25 NOTE — PROGRESS NOTES
Progress Note - Nephrology   Ashley Chanel 60 y o  female MRN: [id number]  Unit/Bed#: -01 Encounter: [id number]      Assessment / Plan:  1  ADI, POA, possibly due to diarrhea, vs hypotension vs ATN with cellulitis  -doubt AIN, urine eos zero  -FLC 2 02-  Acceptable in setting of CKD  -antiPLA 2R pending  -complements and chronic hepatitis panel/HIV/RPR/RF/ASO/anti GBM/ANCA normal, cryoglobulin normal, BHUMI negative  -UpCr 4 57g  -for renal biopsy today  [facility] completed  -A/C being held in preparation for renal biopsy  Last dose aspirin and plavix as of 3/22  Concern for possible ATN  -repeat UA with microscopy  Showed positive glucose, greater than 300 protein, 0-1 RBCs, 2-4 WBCs, occasional bacteria, 2-4 hyaline casts, 5-10 fine granular casts  -renal u/s: normal kidneys but slightly enlarged, possibly due to diabetic nephropathy, normal echogenicity noted-would be safe for renal biopsy  -f/u am BMP/UOP s/p hypotonic fluid for possible dehydration today  -does have LE edema but more likely third spacing in setting of nephrotic syndrome    -continue holding diuretics for now  SCr up to 4 5, b/l sCr 2 15 as of 1 year ago with gradual rise over time noted  Normal sCr prior to 2017  sCr has improved a bit to 4 1 s/p hypotonic fluid  -nonoliguric, no urgent RRT indication, evaluate daily  2  CKD stage 4 vs progressive CKD for some underlying cause (DM2 vs weight related) - f/u renal biopsy results, biopsy being performed today  3  Elevated anion gap acidosis - on high dose sodium bicarbonate supplementation with normalization of serum bicarb, monitor BMP  4  Anemia - SPEP/FLC negative for monoclonality  Hgb 7  1  transfuse prn  Recent iron panel suggests iron deficiency as well as chronic disease as TIBC low, iron and iron sat low  Ferritin 88  Continue oral iron  5  Hyperphosphatemia in setting of ADI - resolved on phoslo 1 tab TID, monitor phos  6   HTN - BP well controlled on amlodipine 5mg daily, hydralazine 75mg po BID and coreg 25mg po BID  Monitor BP  Avoid saline containing solution in light of third spacing and HTN  7  Hypernatremia -d/c NS and provide 500ml D5W today  F/u am BMP  Allow patient to drink to thirst s/p renal biopsy  Expect biopsy results approximately 36hrs s/p biopsy  Will await results  F/u am BMP  Subjective:   She denies any chest pain or shortness of breath  She has been laying for 4 hours status post renal biopsy this morning  She has not yet urinated since the renal biopsy  Objective:     Vitals: Blood pressure 129/59, pulse 61, temperature 98 2 °F (36 8 °C), temperature source Oral, resp  rate 18, height 5' 4" (1 626 m), weight 122 kg (270 lb), SpO2 93 %, not currently breastfeeding  ,Body mass index is 46 35 kg/m²  Temp (24hrs), Av °F (36 7 °C), Min:97 8 °F (36 6 °C), Max:98 2 °F (36 8 °C)      Weight (last 2 days)     Date/Time   Weight    21 0411   122 (270)    21 0539   121 (266 2)    21 0539   120 (264 8)                Intake/Output Summary (Last 24 hours) at 3/25/2021 1202  Last data filed at 3/25/2021 0542  Gross per 24 hour   Intake 1170 ml   Output 550 ml   Net 620 ml     I/O last 24 hours: In: 3559 [P O :300; I V :990]  Out: 850 [Urine:850]        Physical Exam:   Physical Exam  Vitals signs and nursing note reviewed  Constitutional:       General: She is not in acute distress  Appearance: Normal appearance  She is well-developed  She is obese  She is not diaphoretic  HENT:      Head: Normocephalic and atraumatic  Mouth/Throat:      Pharynx: No oropharyngeal exudate  Eyes:      General: No scleral icterus  Right eye: No discharge  Left eye: No discharge  Neck:      Musculoskeletal: Normal range of motion and neck supple  Thyroid: No thyromegaly  Cardiovascular:      Rate and Rhythm: Normal rate and regular rhythm  Heart sounds: No murmur     Pulmonary:      Effort: Pulmonary effort is normal  No respiratory distress  Breath sounds: Normal breath sounds  No wheezing  Abdominal:      General: Bowel sounds are normal  There is no distension  Palpations: Abdomen is soft  Musculoskeletal: Normal range of motion  General: Swelling (b/l LE, wrapped) present  Skin:     General: Skin is warm and dry  Findings: No rash  Neurological:      Mental Status: She is alert  Motor: No abnormal muscle tone        Comments: awake   Psychiatric:         Behavior: Behavior normal          Invasive Devices     Peripheral Intravenous Line            Peripheral IV 03/22/21 Right Hand 3 days                Medications:    Scheduled Meds:  Current Facility-Administered Medications   Medication Dose Route Frequency Provider Last Rate    acetaminophen  650 mg Oral Q6H PRN Sulaiman Gautam PA-C      albumin human  12 5 g Intravenous BID Rondal Carolyn, CRNP 12 5 g (03/25/21 0835)    amLODIPine  5 mg Oral Daily Rondal Carolyn, CRNP      atorvastatin  40 mg Oral Daily Sulaiman Gautam PA-C      calcium acetate  667 mg Oral TID With Meals HOWARD Salguero      carvedilol  25 mg Oral BID Rondal Carolyn, CRNP      dextrose  25 mL Intravenous Once Sharene Saver, DO      diphenhydrAMINE  25 mg Oral Q6H PRN Sulaiman Gautam PA-C      heparin (porcine)  5,000 Units Subcutaneous [facility] Sulaiman Gautam PA-C      hydrALAZINE  75 mg Oral BID Rondal Carolyn, CRNP      insulin glargine  25 Units Subcutaneous KRISTA East MD      insulin lispro  1-6 Units Subcutaneous TID AC Sarah Sanchez PA-C      insulin lispro  1-6 Units Subcutaneous HS Sulaiman Gautam PA-C      iron sucrose  200 mg Intravenous Daily Win Phillips,  mg (03/25/21 0842)    ondansetron  4 mg Intravenous Q6H PRN Jazzmine Romero MD      oxyCODONE  5 mg Oral Q6H PRN Jazzmine Romero MD      sodium bicarbonate  1,300 mg Oral 4x Daily HOWARD Puente      sodium chloride  75 mL/hr Intravenous Continuous Marlo Dame, DO 75 mL/hr (03/25/21 2270)       PRN Meds:   acetaminophen    diphenhydrAMINE    ondansetron    oxyCODONE    Continuous Infusions:sodium chloride, 75 mL/hr, Last Rate: 75 mL/hr (03/25/21 0542)            LAB RESULTS:      Results from last 7 days   Lab Units 03/25/21  0411 03/24/21  0540 03/23/21  0518 03/22/21  0417 03/21/21  0522 03/20/21  0537 03/19/21  0513   WBC Thousand/uL 10 26* 8 92 9 84 9 79 9 57 10 21* 11 57*   HEMOGLOBIN g/dL 7 1* 7 6* 7 3* 7 3* 7 0* 7 9* 7 6*   HEMATOCRIT % 24 2* 25 6* 24 1* 24 4* 24 4* 27 0* 25 9*   PLATELETS Thousands/uL 215 211 207 197 209 240 219   NEUTROS PCT % 70  --   --  68 70 74 80*   LYMPHS PCT % 11*  --   --  9* 7* 7* 5*   MONOS PCT % 7  --   --  8 8 7 6   EOS PCT % 9*  --   --  15* 15* 12* 8*   POTASSIUM mmol/L 4 7 4 5 4 3 4 4 4 3 4 3 4 5   CHLORIDE mmol/L 110* 110* 110* 112* 113* 113* 114*   CO2 mmol/L 25 24 21 18* 18* 18* 18*   BUN mg/dL 77* 75* 73* 75* 72* 71* 72*   CREATININE mg/dL 4 10* 4 38* 4 24* 4 50* 4 37* 3 93* 3 67*   CALCIUM mg/dL 8 0* 8 0* 7 9* 7 9* 7 4* 7 6* 7 5*   ALK PHOS U/L  --   --  94  --   --  89 78   ALT U/L  --   --  26  --   --  13 12   AST U/L  --   --  22  --   --  15 18   MAGNESIUM mg/dL  --   --   --  2 1  --   --   --    PHOSPHORUS mg/dL 3 7  --  3 9  --   --  5 4* 4 5*       CUTURES:  Lab Results   Component Value Date    BLOODCX No Growth After 5 Days  03/15/2021    BLOODCX No Growth After 5 Days  03/15/2021    URINECX <10,000 cfu/ml  03/16/2021                 Portions of the record may have been created with voice recognition software  Occasional wrong word or "sound a like" substitutions may have occurred due to the inherent limitations of voice recognition software  Read the chart carefully and recognize, using context, where substitutions have occurred  If you have any questions, please contact the dictating provider

## 2021-03-25 NOTE — ASSESSMENT & PLAN NOTE
Lab Results   Component Value Date    EGFR 11 03/25/2021    EGFR 10 03/24/2021    EGFR 10 03/23/2021    CREATININE 4 10 (H) 03/25/2021    CREATININE 4 38 (H) 03/24/2021    CREATININE 4 24 (H) 03/23/2021     Patient has stage IV chronic disease with baseline creatinine between 1 49-2  15  She presented with acute kidney injury  She has nephrotic range proteinuria  Nephrology was consulted    Renal function is not significantly better:  Creatinine is 4 1    Patient is scheduled for renal biopsy today she is NPO this morning

## 2021-03-25 NOTE — ASSESSMENT & PLAN NOTE
Wt Readings from Last 3 Encounters:   03/25/21 122 kg (270 lb)   02/21/20 117 kg (258 lb)   01/07/20 117 kg (258 lb 12 8 oz)     Patient admitted with increased lower extremity edema due to acute systolic CHF due to noncompliance with medications  Ejection fraction was 45-50% on this admission    Patient was treated with IV Bumex then IV diuretics were held because of worsening renal function    Lungs are clear to auscultation but patient has persistent lower extremity edema    Diuretics are on hold due to acute kidney injury    I called patient's daughter at the phone number on the chart but no one answered the phone and I could not leave a message because the voicemail has not been set up on March 25th

## 2021-03-25 NOTE — PROGRESS NOTES
[place]  Progress Note - Guanaco Das 1955, 60 y o  female MRN: [id number]  Unit/Bed#: -01 Encounter: [id number]  Primary Care Provider: Chrissie Bill DO   Date and time admitted to hospital: 3/15/2021  7:09 PM    * Acute on chronic combined systolic and diastolic congestive heart failure (HCC)  Assessment & Plan  Wt Readings from Last 3 Encounters:   03/25/21 122 kg (270 lb)   02/21/20 117 kg (258 lb)   01/07/20 117 kg (258 lb 12 8 oz)     Patient admitted with increased lower extremity edema due to acute systolic CHF due to noncompliance with medications  Ejection fraction was 45-50% on this admission  Patient was treated with IV Bumex then IV diuretics were held because of worsening renal function    Lungs are clear to auscultation but patient has persistent lower extremity edema    Diuretics are on hold due to acute kidney injury    I called patient's daughter at the phone number on the chart but no one answered the phone and I could not leave a message because the voicemail has not been set up on March 25th    Acute kidney injury superimposed on chronic kidney disease [facility])  Assessment & Plan  Lab Results   Component Value Date    EGFR 11 03/25/2021    EGFR 10 03/24/2021    EGFR 10 03/23/2021    CREATININE 4 10 (H) 03/25/2021    CREATININE 4 38 (H) 03/24/2021    CREATININE 4 24 (H) 03/23/2021     Patient has stage IV chronic disease with baseline creatinine between 1 49-2  15  She presented with acute kidney injury  She has nephrotic range proteinuria  Nephrology was consulted    Renal function is not significantly better:  Creatinine is 4 1    Patient is scheduled for renal biopsy today she is NPO this morning      Type 2 diabetes mellitus with stage 4 chronic kidney disease, with long-term current use of insulin [facility])  Assessment & Plan  Lab Results   Component Value Date    HGBA1C 7 6 (H) 03/15/2021       Recent Labs     03/24/21  1639 03/24/21  2154 21  0017 21  0732   POCGLU 153* 198* 158* 116       Blood Sugar Average: Last 72 hrs:  · (P) 318 [id number]     Patient has type 2 diabetes on insulin with stage IV chronic disease  Blood sugars are controlled, continue same dose of Lantus and sliding scale insulin    Anemia due to stage 4 chronic kidney disease [facility])  Assessment & Plan  Patient has normocytic anemia  Iron studies revealed anemia chronic disease likely due to chronic disease  Patient denies any signs of bleeding  Patient's hemoglobin is worse today at 7 1 likely due to hemodilution from IV fluids yesterday    Will monitor renal function closely        VTE Prophylaxis: in place    Patient Centered Rounds: I rounded with patient's nurse    Current Length of Stay: 10 day(s)    Current Patient Status: Inpatient    Certification Statement: Pt requires additional inpatient hospital stay due to: see assessment and plan        Subjective:   Patient is anxious about the renal biopsy today  Denies chest pain, shortness of breath, abdominal pain, diarrhea, signs of GI or  bleeding    All other ROS are negative    Objective:     Vitals:   Temp (24hrs), Av °F (36 7 °C), Min:97 8 °F (36 6 °C), Max:98 2 °F (36 8 °C)    Temp:  [97 8 °F (36 6 °C)-98 2 °F (36 8 °C)] 98 2 °F (36 8 °C)  HR:  [66-71] 66  Resp:  [13-20] 20  BP: (138-184)/(58-76) 184/74  SpO2:  [83 %-95 %] 93 %  Body mass index is 46 35 kg/m²  Input and Output Summary (last 24 hours): Intake/Output Summary (Last 24 hours) at 3/25/2021 8193  Last data filed at 3/25/2021 0542  Gross per 24 hour   Intake 1290 ml   Output 850 ml   Net 440 ml       Physical Exam:     Physical Exam  Constitutional:       General: She is not in acute distress  HENT:      Head: Normocephalic  Eyes:      Conjunctiva/sclera: Conjunctivae normal    Cardiovascular:      Rate and Rhythm: Normal rate and regular rhythm  Heart sounds: Murmur (Systolic murmur is present) present  Pulmonary:      Effort: No respiratory distress  Breath sounds: Normal breath sounds  No wheezing or rales  Abdominal:      Palpations: Abdomen is soft  Tenderness: There is no abdominal tenderness  Comments: Ventral hernia is present without tenderness   Skin:     General: Skin is warm and dry  Comments: There is no change in pedal edema   Neurological:      Mental Status: She is alert and oriented to person, place, and time  Comments: Moves all extremities on command, speech is normal             I personally reviewed labs and imaging reports for today        Last 24 Hours Medication List:   Current Facility-Administered Medications   Medication Dose Route Frequency Provider Last Rate    acetaminophen  650 mg Oral Q6H PRN David Martinez PA-C      albumin human  12 5 g Intravenous BID Jerome Maxin, CRNP 12 5 g (03/23/21 1039)    amLODIPine  5 mg Oral Daily Jerome Maxin, CRNP      atorvastatin  40 mg Oral Daily David Martinez PA-C      calcium acetate  667 mg Oral TID With Meals HOWARD Pollard      carvedilol  25 mg Oral BID Jerome Maxin, CRNP      dextrose  25 mL Intravenous Once Emrgenny Cantus, DO      diphenhydrAMINE  25 mg Oral Q6H PRN David Martinez PA-C      heparin (porcine)  5,000 Units Subcutaneous [facility] David Martinez PA-C      hydrALAZINE  75 mg Oral BID Jerome Maxin, CRNP      insulin glargine  25 Units Subcutaneous QAM Radha Chatman MD      insulin lispro  1-6 Units Subcutaneous TID AC David Martinez PA-C      insulin lispro  1-6 Units Subcutaneous HS David Martinez PA-C      iron sucrose  200 mg Intravenous Daily Win Phillips DO      ondansetron  4 mg Intravenous Q6H PRN Jose Ramon Rebolledo MD      oxyCODONE  5 mg Oral Q6H PRN Jose Ramon Rebolledo MD      sodium bicarbonate  1,300 mg Oral 4x Daily HOWARD Puente      sodium chloride  75 mL/hr Intravenous Continuous Sagrario Moya, DO 75 mL/hr (03/25/21 0542)          Today, Patient Was Seen By: Radha Chatman MD    ** Please Note: Dictation voice to text software may have been used in the creation of this document   **

## 2021-03-25 NOTE — PLAN OF CARE
Problem: Potential for Falls  Goal: Patient will remain free of falls  Description: INTERVENTIONS:  - Assess patient frequently for physical needs  -  Identify cognitive and physical deficits and behaviors that affect risk of falls    -  [place] fall precautions as indicated by assessment   - Educate patient/family on patient safety including physical limitations  - Instruct patient to call for assistance with activity based on assessment  - Modify environment to reduce risk of injury  - Consider OT/PT consult to assist with strengthening/mobility  Outcome: Progressing

## 2021-03-25 NOTE — ASSESSMENT & PLAN NOTE
Patient has normocytic anemia  Iron studies revealed anemia chronic disease likely due to chronic disease  Patient denies any signs of bleeding      Patient's hemoglobin is worse today at 7 1 likely due to hemodilution from IV fluids yesterday    Will monitor renal function closely

## 2021-03-25 NOTE — PROGRESS NOTES
Alerted by PCA that patient became dizzy during ambulation to bathroom and had to sit down  RN assessed patient to be sitting in chair  Manual blood pressure is 138/58, heart rate 71, at 83% on room air  Replaced 1 liter of O2 on patient now 91%  Blood sugar is 158  Patient reports ongoing anxiety related to personal life and procedure in am  Patient reports being able to feel heart rate in ears, denies chest pain or palpitation in chest      Assisted patient to commode and notified provider Joshua Plant PA  Patient now sitting in recliner, reports feeling "a lot better" after sitting back down  Instructed to continue to monitor patient  Patient resting comfortably in chair, will continue to monitor   Johnny XIOMY Donis

## 2021-03-25 NOTE — ASSESSMENT & PLAN NOTE
Lab Results   Component Value Date    HGBA1C 7 6 (H) 03/15/2021       Recent Labs     03/24/21  1639 03/24/21  2154 03/25/21  0017 03/25/21  0732   POCGLU 153* 198* 158* 116       Blood Sugar Average: Last 72 hrs:  · (P) 437 [id number]     Patient has type 2 diabetes on insulin with stage IV chronic disease      Blood sugars are controlled, continue same dose of Lantus and sliding scale insulin

## 2021-03-26 LAB
ANION GAP SERPL CALCULATED.3IONS-SCNC: 12 MMOL/L (ref 4–13)
BASOPHILS # BLD AUTO: 0.06 THOUSANDS/ΜL (ref 0–0.1)
BASOPHILS NFR BLD AUTO: 0 % (ref 0–1)
BUN SERPL-MCNC: 76 MG/DL (ref 5–25)
CALCIUM SERPL-MCNC: 8 MG/DL (ref 8.3–10.1)
CHLORIDE SERPL-SCNC: 108 MMOL/L (ref 100–108)
CO2 SERPL-SCNC: 23 MMOL/L (ref 21–32)
CREAT SERPL-MCNC: 4.31 MG/DL (ref 0.6–1.3)
CRYOGLOB SER QL 1D COLD INC: POSITIVE
EOSINOPHIL # BLD AUTO: 0.55 THOUSAND/ΜL (ref 0–0.61)
EOSINOPHIL NFR BLD AUTO: 4 % (ref 0–6)
ERYTHROCYTE [DISTWIDTH] IN BLOOD BY AUTOMATED COUNT: 17 % (ref 11.6–15.1)
GFR SERPL CREATININE-BSD FRML MDRD: 10 ML/MIN/1.73SQ M
GLUCOSE SERPL-MCNC: 138 MG/DL (ref 65–140)
GLUCOSE SERPL-MCNC: 164 MG/DL (ref 65–140)
GLUCOSE SERPL-MCNC: 181 MG/DL (ref 65–140)
GLUCOSE SERPL-MCNC: 224 MG/DL (ref 65–140)
GLUCOSE SERPL-MCNC: 97 MG/DL (ref 65–140)
HCT VFR BLD AUTO: 23 % (ref 34.8–46.1)
HCT VFR BLD AUTO: 23.2 % (ref 34.8–46.1)
HCT VFR BLD AUTO: 24.4 % (ref 34.8–46.1)
HGB BLD-MCNC: 6.7 G/DL (ref 11.5–15.4)
HGB BLD-MCNC: 6.7 G/DL (ref 11.5–15.4)
HGB BLD-MCNC: 7 G/DL (ref 11.5–15.4)
IMM GRANULOCYTES # BLD AUTO: 0.12 THOUSAND/UL (ref 0–0.2)
IMM GRANULOCYTES NFR BLD AUTO: 1 % (ref 0–2)
LYMPHOCYTES # BLD AUTO: 0.85 THOUSANDS/ΜL (ref 0.6–4.47)
LYMPHOCYTES NFR BLD AUTO: 6 % (ref 14–44)
MCH RBC QN AUTO: 28.3 PG (ref 26.8–34.3)
MCHC RBC AUTO-ENTMCNC: 29.1 G/DL (ref 31.4–37.4)
MCV RBC AUTO: 97 FL (ref 82–98)
MONOCYTES # BLD AUTO: 0.94 THOUSAND/ΜL (ref 0.17–1.22)
MONOCYTES NFR BLD AUTO: 7 % (ref 4–12)
NEUTROPHILS # BLD AUTO: 11.31 THOUSANDS/ΜL (ref 1.85–7.62)
NEUTS SEG NFR BLD AUTO: 82 % (ref 43–75)
PHOSPHATE SERPL-MCNC: 4.8 MG/DL (ref 2.3–4.1)
PLATELET # BLD AUTO: 201 THOUSANDS/UL (ref 149–390)
PMV BLD AUTO: 11.3 FL (ref 8.9–12.7)
POTASSIUM SERPL-SCNC: 4.8 MMOL/L (ref 3.5–5.3)
RBC # BLD AUTO: 2.37 MILLION/UL (ref 3.81–5.12)
SODIUM SERPL-SCNC: 143 MMOL/L (ref 136–145)
WBC # BLD AUTO: 13.83 THOUSAND/UL (ref 4.31–10.16)

## 2021-03-26 PROCEDURE — 99233 SBSQ HOSP IP/OBS HIGH 50: CPT | Performed by: INTERNAL MEDICINE

## 2021-03-26 PROCEDURE — 85018 HEMOGLOBIN: CPT | Performed by: INTERNAL MEDICINE

## 2021-03-26 PROCEDURE — 84100 ASSAY OF PHOSPHORUS: CPT | Performed by: INTERNAL MEDICINE

## 2021-03-26 PROCEDURE — 85025 COMPLETE CBC W/AUTO DIFF WBC: CPT | Performed by: INTERNAL MEDICINE

## 2021-03-26 PROCEDURE — 85014 HEMATOCRIT: CPT | Performed by: INTERNAL MEDICINE

## 2021-03-26 PROCEDURE — 82948 REAGENT STRIP/BLOOD GLUCOSE: CPT

## 2021-03-26 PROCEDURE — 99232 SBSQ HOSP IP/OBS MODERATE 35: CPT | Performed by: INTERNAL MEDICINE

## 2021-03-26 PROCEDURE — 80048 BASIC METABOLIC PNL TOTAL CA: CPT | Performed by: INTERNAL MEDICINE

## 2021-03-26 RX ORDER — BUMETANIDE 0.25 MG/ML
1 INJECTION, SOLUTION INTRAMUSCULAR; INTRAVENOUS 2 TIMES DAILY
Status: DISCONTINUED | OUTPATIENT
Start: 2021-03-26 | End: 2021-03-29

## 2021-03-26 RX ORDER — SODIUM BICARBONATE 650 MG/1
1300 TABLET ORAL
Status: DISCONTINUED | OUTPATIENT
Start: 2021-03-26 | End: 2021-03-29

## 2021-03-26 RX ADMIN — IRON SUCROSE 200 MG: 20 INJECTION, SOLUTION INTRAVENOUS at 09:57

## 2021-03-26 RX ADMIN — ALBUMIN (HUMAN) 12.5 G: 0.25 INJECTION, SOLUTION INTRAVENOUS at 09:08

## 2021-03-26 RX ADMIN — HYDRALAZINE HYDROCHLORIDE 75 MG: 25 TABLET, FILM COATED ORAL at 21:30

## 2021-03-26 RX ADMIN — HYDRALAZINE HYDROCHLORIDE 75 MG: 25 TABLET, FILM COATED ORAL at 09:08

## 2021-03-26 RX ADMIN — HEPARIN SODIUM 5000 UNITS: 5000 INJECTION INTRAVENOUS; SUBCUTANEOUS at 06:11

## 2021-03-26 RX ADMIN — ATORVASTATIN CALCIUM 40 MG: 40 TABLET, FILM COATED ORAL at 09:09

## 2021-03-26 RX ADMIN — INSULIN GLARGINE 25 UNITS: 100 INJECTION, SOLUTION SUBCUTANEOUS at 09:17

## 2021-03-26 RX ADMIN — INSULIN LISPRO 2 UNITS: 100 INJECTION, SOLUTION INTRAVENOUS; SUBCUTANEOUS at 17:24

## 2021-03-26 RX ADMIN — INSULIN LISPRO 1 UNITS: 100 INJECTION, SOLUTION INTRAVENOUS; SUBCUTANEOUS at 11:48

## 2021-03-26 RX ADMIN — CARVEDILOL 25 MG: 12.5 TABLET, FILM COATED ORAL at 09:09

## 2021-03-26 RX ADMIN — CALCIUM ACETATE 667 MG: 667 CAPSULE ORAL at 17:23

## 2021-03-26 RX ADMIN — BUMETANIDE 1 MG: 0.25 INJECTION, SOLUTION INTRAMUSCULAR; INTRAVENOUS at 17:23

## 2021-03-26 RX ADMIN — INSULIN LISPRO 1 UNITS: 100 INJECTION, SOLUTION INTRAVENOUS; SUBCUTANEOUS at 22:13

## 2021-03-26 RX ADMIN — SODIUM BICARBONATE 650 MG TABLET 1300 MG: at 17:23

## 2021-03-26 RX ADMIN — SODIUM BICARBONATE 650 MG TABLET 1300 MG: at 09:08

## 2021-03-26 RX ADMIN — CALCIUM ACETATE 667 MG: 667 CAPSULE ORAL at 09:09

## 2021-03-26 RX ADMIN — CALCIUM ACETATE 667 MG: 667 CAPSULE ORAL at 11:47

## 2021-03-26 RX ADMIN — CARVEDILOL 25 MG: 12.5 TABLET, FILM COATED ORAL at 17:23

## 2021-03-26 RX ADMIN — AMLODIPINE BESYLATE 5 MG: 5 TABLET ORAL at 09:10

## 2021-03-26 NOTE — PLAN OF CARE
Problem: Potential for Falls  Goal: Patient will remain free of falls  Description: INTERVENTIONS:  - Assess patient frequently for physical needs  -  Identify cognitive and physical deficits and behaviors that affect risk of falls    -  [place] fall precautions as indicated by assessment   - Educate patient/family on patient safety including physical limitations  - Instruct patient to call for assistance with activity based on assessment  - Modify environment to reduce risk of injury  - Consider OT/PT consult to assist with strengthening/mobility  Outcome: Progressing

## 2021-03-26 NOTE — NURSING NOTE
Pt has c/o of nausea and actively dry heaving  Offered pt PRN zofran but pt refuses stating "it doesn't work"  Helped reposition pt and offered cool compress  Currently sitting in chair with legs elevated and call bell in reach  Will continue to monitor

## 2021-03-26 NOTE — PHYSICAL THERAPY NOTE
Physical Therapy Cancellation Note       03/26/21 1135   PT Last Visit   PT Visit Date 03/26/21   Note Type   Cancel Reasons Medical status   Chart reviewed  Hbg 6 1  Per nursing patient refusing transfusion at this time  To repeat labs and possible imaging in afternoon  Nurse recommend hold of PT at this time        Jameson Vital, PT

## 2021-03-26 NOTE — PROGRESS NOTES
[place]  Progress Note - Brennan Schmidt 1955, 60 y o  female MRN: [id number]  Unit/Bed#: -01 Encounter: [id number]  Primary Care Provider: Milly Freeman DO   Date and time admitted to hospital: 3/15/2021  7:09 PM    Acute kidney injury superimposed on chronic kidney disease [facility])  Assessment & Plan  Lab Results   Component Value Date    EGFR 10 03/26/2021    EGFR 11 03/25/2021    EGFR 10 03/24/2021    CREATININE 4 31 (H) 03/26/2021    CREATININE 4 10 (H) 03/25/2021    CREATININE 4 38 (H) 03/24/2021     Patient has stage IV chronic disease with baseline creatinine between 1 49-2  15  She presented with acute kidney injury  She has nephrotic range proteinuria  Nephrology was consulted    Patient underwent renal biopsy yesterday  She denies any abdominal pain or signs of bleeding    Renal function is at the same level with creatinine 4 3  We are awaiting pathology report    I discussed the case with patient's son on the phone in detail on March 26th      Type 2 diabetes mellitus with stage 4 chronic kidney disease, with long-term current use of insulin [facility])  Assessment & Plan  Lab Results   Component Value Date    HGBA1C 7 6 (H) 03/15/2021       Recent Labs     03/25/21  1109 03/25/21  1723 03/25/21  2239 03/26/21  0744   POCGLU 87 150* 80 138       Blood Sugar Average: Last 72 hrs:  · (P) 430 [id number]     Patient has type 2 diabetes on insulin with stage IV chronic disease  Blood sugars are controlled, continue same dose of Lantus and sliding scale insulin    Anemia due to stage 4 chronic kidney disease [facility])  Assessment & Plan  Patient has normocytic anemia  Iron studies revealed anemia chronic disease likely due to chronic disease  Patient denies any signs of bleeding  Patient's hemoglobin decreased to 6 7 after kidney biopsy  She denies any abdominal pain flank pain    I offered blood transfusion to her but since she feels well without chest pain, shortness of breath, nausea abdominal pain, signs of bleeding she declined it  My plan is repeat hemoglobin at 12 noon and if it decreases then will do CT of the abdomen to look for bleeding after renal biopsy  VTE Prophylaxis:  I am holding subcutaneous heparin due to worsening anemia in case patient has intra-abdominal bleed    Patient Centered Rounds: I rounded with patient's nurse    Current Length of Stay: 11 day(s)    Current Patient Status: Inpatient    Certification Statement: Pt requires additional inpatient hospital stay due to: see assessment and plan        Subjective:   Patient denies any abdominal pain or flank pain after kidney biopsy  Denies any chest pain, shortness of breath, signs of GI or  bleeding    All other ROS are negative    Objective:     Vitals:   Temp (24hrs), Av 4 °F (36 9 °C), Min:98 2 °F (36 8 °C), Max:98 8 °F (37 1 °C)    Temp:  [98 2 °F (36 8 °C)-98 8 °F (37 1 °C)] 98 2 °F (36 8 °C)  HR:  [61-67] 67  Resp:  [15-20] 15  BP: (118-165)/(56-72) 165/72  SpO2:  [93 %-98 %] 97 %  Body mass index is 46 83 kg/m²  Input and Output Summary (last 24 hours): Intake/Output Summary (Last 24 hours) at 3/26/2021 1011  Last data filed at 3/25/2021 2200  Gross per 24 hour   Intake --   Output 200 ml   Net -200 ml       Physical Exam:     Physical Exam  HENT:      Head: Normocephalic  Eyes:      Conjunctiva/sclera: Conjunctivae normal    Neck:      Musculoskeletal: Neck supple  Cardiovascular:      Rate and Rhythm: Normal rate and regular rhythm  Heart sounds: Murmur (Systolic murmur is heard) present  Pulmonary:      Effort: No respiratory distress  Breath sounds: No wheezing or rales  Abdominal:      General: Bowel sounds are normal       Palpations: Abdomen is soft  Tenderness: There is no abdominal tenderness  There is left CVA tenderness ( slight left CVA tenderness is present)  There is no right CVA tenderness     Skin:     General: Skin is warm and dry  Comments: Lower extremity edema is seen, patient has no evidence of lower leg cellulitis   Neurological:      Mental Status: She is alert and oriented to person, place, and time  Cranial Nerves: No cranial nerve deficit  Comments: She moves all extremities on command, speech is normal   Psychiatric:         Mood and Affect: Mood normal              I personally reviewed labs and imaging reports for today  Last 24 Hours Medication List:   Current Facility-Administered Medications   Medication Dose Route Frequency Provider Last Rate    acetaminophen  650 mg Oral Q6H PRN Sorin Morning, PA-C      amLODIPine  5 mg Oral Daily Neldon Alfonso CRLOS      atorvastatin  40 mg Oral Daily Sorin Morning, PA-C      calcium acetate  667 mg Oral TID With Meals HOWARD Estrella      carvedilol  25 mg Oral BID HOWARD Bush      dextrose  25 mL Intravenous Once Anjali Sandy DO      diphenhydrAMINE  25 mg Oral Q6H PRN Mauro Morning, PA-C      hydrALAZINE  75 mg Oral BID HOWARD Bush      insulin glargine  25 Units Subcutaneous QAM King Shira MD      insulin lispro  1-6 Units Subcutaneous TID AC Sorin Morning, PA-C      insulin lispro  1-6 Units Subcutaneous HS Sorin Morning, PA-C      iron sucrose  200 mg Intravenous Daily Win Phillips,  mg (03/26/21 0957)    ondansetron  4 mg Intravenous Q6H PRN Johnny Fountain MD      oxyCODONE  5 mg Oral Q6H PRN Johnny Fountain MD      sodium bicarbonate  1,300 mg Oral TID after meals Jeannette Stephens PA-C            Today, Patient Was Seen By: King Shira MD    ** Please Note: Dictation voice to text software may have been used in the creation of this document   **

## 2021-03-26 NOTE — PROGRESS NOTES
NEPHROLOGY PROGRESS NOTE   Ashley Chanel 60 y o  female MRN: [id number]  Unit/Bed#: -01 Encounter: [id number]      ASSESSMENT/PLAN:  1  Acute kidney injury, POA:  Possibly related to diarrhea, hypotension and ATN with cellulitis  Doubt AIN as urine eosinophils 0     · Creatinine increased to 4 3 today  · Workup:  Free light chain 2 02, complements/hepatitis panel/HIV/RPR/rheumatoid factor/AS/anti-GBM/ANCA/BHUMI/cryoglobin unremarkable  · Pending:  AntiPLA 2R  · Urine protein creatinine ratio 4 57 g  · Repeat UA:  Glucose, 3+ protein, 0-1 RBC, 2-4 WBC, occasional bacteria, 2-4 hyaline casts, 5-10 granular cast  · Renal ultrasound:  Slightly enlarged kidneys possibly due to diabetic nephropathy, normal echogenicity  · Status post renal biopsy yesterday  · Suspect edema is related to 3rd spacing from nephrotic syndrome  · No current indication for HD   · Trend creatinine   2  CKD stage 4 versus progression of CKD:  Creatinine normal prior to 2017 but then 2 15 a year ago   · Likely related to diabetes versus obesity but renal biopsy is pending  3  Anion gap metabolic acidosis:  Currently on sodium bicarb 1300 mg 4 times a day  4  Anemia:  No signs of monoclonal gammopathy on workup  Currently getting Venofer 200 mg x 4 doses  · Hemoglobin decreased to 6 7 today--discussed with primary team who will be giving blood  5  Hyperphosphatemia:  On PhosLo 1 tab t i d  With meals  6  Hypertension:  Continue amlodipine 5 mg daily, hydralazine 75 mg p o  B i d , Coreg 25 mg p o  B i d   7  Hypernatremia:  Improved with D5W yesterday  Currently drinking well and will trend off fluid    Plan Summary:    Transfusion today per primary team--would recommend IV Lasix with blood   Will discontinue albumin   Decrease sodium bicarb to 3 times a day   Check a m  BMP   Currently no need to start hemodialysis but will trend closely    SUBJECTIVE:  Status post renal biopsy yesterday with no back pain or hematuria today    No chest pain, shortness of breath, vomiting or diarrhea  Does have nausea but is still able to eat and drink  Thinks that her edema is stable      OBJECTIVE:  Current Weight: Weight - Scale: 124 kg (272 lb 12 8 oz)  Vitals:    03/26/21 0746   BP: 165/72   Pulse: 67   Resp: 15   Temp: 98 2 °F (36 8 °C)   SpO2: 97%       Intake/Output Summary (Last 24 hours) at 3/26/2021 8581  Last data filed at 3/25/2021 2200  Gross per 24 hour   Intake --   Output 200 ml   Net -200 ml       General:  No acute distress  Skin:  No rash  Eyes:  Sclerae anicteric  ENT:  Moist mucous membranes  Neck:  Trachea midline   Chest:  Clear to auscultation bilaterally  CVS:  Regular rate and rhythm  Abdomen:  Soft, nontender, nondistended  Extremities:  Significant bilateral lower extremity edema with lower extremity wound  Neuro:  Awake and alert  Psych:  Appropriate affect    Medications:  Scheduled Meds:  Current Facility-Administered Medications   Medication Dose Route Frequency Provider Last Rate    acetaminophen  650 mg Oral Q6H PRN Martinez Berry PA-C      albumin human  12 5 g Intravenous BID LisaHOWARD Ashley 12 5 g (03/26/21 0908)    amLODIPine  5 mg Oral Daily HOWARD Thomas      atorvastatin  40 mg Oral Daily Martinez Berry PA-C      calcium acetate  667 mg Oral TID With Meals HOWARD Roman      carvedilol  25 mg Oral BID HOWARD Thomas      dextrose  25 mL Intravenous Once Dolph Peewee, DO      diphenhydrAMINE  25 mg Oral Q6H PRN Martinez Berry PA-C      heparin (porcine)  5,000 Units Subcutaneous [facility] Martinez Berry PA-C      hydrALAZINE  75 mg Oral BID HOWARD Thomas      insulin glargine  25 Units Subcutaneous QAM Jones King MD      insulin lispro  1-6 Units Subcutaneous TID AC Sarah Sanchez PA-C      insulin lispro  1-6 Units Subcutaneous HS Martinez Berry PA-C      iron sucrose  200 mg Intravenous Daily Win Phillips,  mg (03/25/21 0842)    ondansetron  4 mg Intravenous Q6H PRN Thea Box MD  oxyCODONE  5 mg Oral Q6H PRN Ag Kyle MD      sodium bicarbonate  1,300 mg Oral 4x Daily HOWARD Puente         PRN Meds:   acetaminophen    diphenhydrAMINE    ondansetron    oxyCODONE      Laboratory Results:  Results from last 7 days   Lab Units 03/26/21  0601 03/25/21  0411 03/24/21  0540 03/23/21  0518 03/22/21  0417 03/21/21  0522 03/20/21  0537   WBC Thousand/uL 13 83* 10 26* 8 92 9 84 9 79 9 57 10 21*   HEMOGLOBIN g/dL 6 7* 7 1* 7 6* 7 3* 7 3* 7 0* 7 9*   HEMATOCRIT % 23 0* 24 2* 25 6* 24 1* 24 4* 24 4* 27 0*   PLATELETS Thousands/uL 201 215 211 207 197 209 240   SODIUM mmol/L 143 146* 146* 145 146* 143 146*   POTASSIUM mmol/L 4 8 4 7 4 5 4 3 4 4 4 3 4 3   CHLORIDE mmol/L 108 110* 110* 110* 112* 113* 113*   CO2 mmol/L 23 25 24 21 18* 18* 18*   BUN mg/dL 76* 77* 75* 73* 75* 72* 71*   CREATININE mg/dL 4 31* 4 10* 4 38* 4 24* 4 50* 4 37* 3 93*   CALCIUM mg/dL 8 0* 8 0* 8 0* 7 9* 7 9* 7 4* 7 6*   MAGNESIUM mg/dL  --   --   --   --  2 1  --   --    PHOSPHORUS mg/dL 4 8* 3 7  --  3 9  --   --  5 4*

## 2021-03-26 NOTE — PROGRESS NOTES
Progress Note - Podiatry  Qian Velázquez 60 y o  female MRN: [id number]  Unit/Bed#: -01 Encounter: [id number]    Assessment/Plan:    1  Skin ulceration left heel partial depth with DM2   - Continue with Maxorb Ag every other day  - Patient should follow-up at [address]  upon discharge  2   Cellulitis bilateral legs              -secondary to #3                -diminishing erythema and edema bilateral resolving satisfactorily    -should be okay for discharge if medically cleared regarding renal complications  3  Venous hypertension with ulceration and inflammation bilateral legs   -nursing advised to encourage elevation of extremities and to avoid dependence at all times  -multiple wounds bilateral legs improving  No strike through drainage noted today              -continue with Xeroform, ABDs, Kerlix, and six-inch Ace wraps QOD   -dressing changes by nursing staff  4  Noncompliance with medication, anemia, ADI, essential hypertension, morbid obesity, DM2   -status post kidney biopsy              -managed per Internal Medicine and Nephrology      Subjective/Objective   Chief Complaint:   Chief Complaint   Patient presents with    Foot Pain     pt c/o left heel pain  pt has had pain for few days  pt also has ulcers on top of left foot  Subjective:  71-year-old white female resting comfortably in bedside recliner with legs dependent  Denies any new pain or discomfort from legs  Relates legs feel much better compared to admission  2  Denies any fever shortness of breath  Blood pressure 165/72, pulse 67, temperature 98 2 °F (36 8 °C), temperature source Oral, resp  rate 15, height 5' 4" (1 626 m), weight 124 kg (272 lb 12 8 oz), SpO2 97 %, not currently breastfeeding  ,Body mass index is 46 83 kg/m²      Invasive Devices     Peripheral Intravenous Line            Peripheral IV 03/25/21 Right Antecubital less than 1 day                Physical Exam:   General appearance: alert, cooperative and no distress  Neuro/Vascular: Normal strength  Sensation and reflexes:  Diminished epicritic sensation  Pulses: Right: DP 0/4, PT 0/4, Left: DP 0/4, PT 0/4  Capillary Filling:  3 Sec, Edema:  + 2 - +3 edema bilateral  Extremity: Ulcers/Wounds:   · Left heel:  Partial depth ulcerative breakdown posterior lateral aspect, minimal serosanguineous drainage, 50% yellow, 50% pink/red, no evidence of acute infection  Pain with palpation  · Bilateral legs:  Multiple partial depth venous hypertension ulcerations with diminishing size and much less  drainage  Erythema appears be resolving with less calor  Overall legs are improving dramatically  Edema has also reduced in both legs  Labs and other studies:   CBC w/diff  Results from last 7 days   Lab Units 03/26/21  0601   WBC Thousand/uL 13 83*   HEMOGLOBIN g/dL 6 7*   HEMATOCRIT % 23 0*   PLATELETS Thousands/uL 201   NEUTROS PCT % 82*   LYMPHS PCT % 6*   MONOS PCT % 7   EOS PCT % 4     BMP  Results from last 7 days   Lab Units 03/26/21  0601   POTASSIUM mmol/L 4 8   CHLORIDE mmol/L 108   CO2 mmol/L 23   BUN mg/dL 76*   CREATININE mg/dL 4 31*   CALCIUM mg/dL 8 0*     CMP  Results from last 7 days   Lab Units 03/26/21  0601  03/23/21  0518   POTASSIUM mmol/L 4 8   < > 4 3   CHLORIDE mmol/L 108   < > 110*   CO2 mmol/L 23   < > 21   BUN mg/dL 76*   < > 73*   CREATININE mg/dL 4 31*   < > 4 24*   CALCIUM mg/dL 8 0*   < > 7 9*   ALK PHOS U/L  --   --  94   ALT U/L  --   --  26   AST U/L  --   --  22    < > = values in this interval not displayed  @Culture@  Lab Results   Component Value Date    BLOODCX No Growth After 5 Days  03/15/2021    BLOODCX No Growth After 5 Days   03/15/2021    URINECX <10,000 cfu/ml  03/16/2021     No results found for: WOUNDCULT      Current Facility-Administered Medications:     acetaminophen (TYLENOL) tablet 650 mg, 650 mg, Oral, Q6H PRN, Sarah Sanchez PA-C, 650 mg at 03/17/21 2320    albumin human (FLEXBUMIN) 25 % injection 12 5 g, 12 5 g, Intravenous, BID, HOWARD Valencia, Last Rate: 2 mL/hr at 03/25/21 0835, 12 5 g at 03/25/21 2240    amLODIPine (NORVASC) tablet 5 mg, 5 mg, Oral, Daily, Ling Moralesde, CRNP, 5 mg at 03/25/21 0834    atorvastatin (LIPITOR) tablet 40 mg, 40 mg, Oral, Daily, Sarah Sanchez PA-C, 40 mg at 03/25/21 0052    calcium acetate (PHOSLO) capsule 667 mg, 667 mg, Oral, TID With Meals, Skye Ramsay, CRNP, 667 mg at 03/25/21 1731    carvedilol (COREG) tablet 25 mg, 25 mg, Oral, BID, Ling Carrera, CRNP, 25 mg at 03/25/21 1730    dextrose 50 % IV solution 25 mL, 25 mL, Intravenous, Once, Tommy Mckeon DO    diphenhydrAMINE (BENADRYL) tablet 25 mg, 25 mg, Oral, Q6H PRN, Austin Ryan PA-C, 25 mg at 03/17/21 0545    heparin (porcine) subcutaneous injection 5,000 Units, 5,000 Units, Subcutaneous, Q8H [address], 5,000 Units at 03/26/21 0611 **AND** [COMPLETED] Platelet count, , , Once, Austin Ryan PA-C    hydrALAZINE (APRESOLINE) tablet 75 mg, 75 mg, Oral, BID, Ling Deandre, CRNP, 75 mg at 03/25/21 2225    insulin glargine (LANTUS) subcutaneous injection 25 Units 0 25 mL, 25 Units, Subcutaneous, QAJag ARAIZA MD, 25 Units at 03/25/21 0833    insulin lispro (HumaLOG) 100 units/mL subcutaneous injection 1-6 Units, 1-6 Units, Subcutaneous, TID AC, 1 Units at 03/25/21 1732 **AND** Fingerstick Glucose (POCT), , , TID AC, Sarah Sanchez PA-C    insulin lispro (HumaLOG) 100 units/mL subcutaneous injection 1-6 Units, 1-6 Units, Subcutaneous, HS, Sarah Sanchez PA-C, 2 Units at 03/24/21 2155    iron sucrose (VENOFER) 200 mg in sodium chloride 0 9% 100 ml IVPB 200 mg, 200 mg, Intravenous, Daily, Win Phillips DO, Last Rate: 100 mL/hr at 03/25/21 0842, 200 mg at 03/25/21 0842    ondansetron (ZOFRAN) injection 4 mg, 4 mg, Intravenous, Q6H PRN, Wale Parker MD, 4 mg at 03/25/21 1221    oxyCODONE (ROXICODONE) IR tablet 5 mg, 5 mg, Oral, Q6H PRN, Wale Parker MD    sodium bicarbonate tablet 1,300 mg, 1,300 mg, Oral, 4x Daily, HOWARD Puente, 1,300 mg at 03/25/21 7242    Imaging: I have personally reviewed pertinent films in PACS  EKG, Pathology, and Other Studies: I have personally reviewed pertinent reports    VTE Pharmacologic Prophylaxis: Heparin  VTE Mechanical Prophylaxis: reason for no mechanical VTE prophylaxis Bilateral leg wounds    Joel Rahat, JAMIE

## 2021-03-26 NOTE — ASSESSMENT & PLAN NOTE
Lab Results   Component Value Date    EGFR 10 03/26/2021    EGFR 11 03/25/2021    EGFR 10 03/24/2021    CREATININE 4 31 (H) 03/26/2021    CREATININE 4 10 (H) 03/25/2021    CREATININE 4 38 (H) 03/24/2021     Patient has stage IV chronic disease with baseline creatinine between 1 49-2  15  She presented with acute kidney injury  She has nephrotic range proteinuria    Nephrology was consulted    Patient underwent renal biopsy yesterday  She denies any abdominal pain or signs of bleeding    Renal function is at the same level with creatinine 4 3  We are awaiting pathology report    I discussed the case with patient's son on the phone in detail on March 26th

## 2021-03-26 NOTE — ASSESSMENT & PLAN NOTE
Patient has normocytic anemia  Iron studies revealed anemia chronic disease likely due to chronic disease  Patient denies any signs of bleeding  Patient's hemoglobin decreased to 6 7 after kidney biopsy  She denies any abdominal pain flank pain  I offered blood transfusion to her but since she feels well without chest pain, shortness of breath, nausea abdominal pain, signs of bleeding she declined it  My plan is repeat hemoglobin at 12 noon and if it decreases then will do CT of the abdomen to look for bleeding after renal biopsy

## 2021-03-26 NOTE — ASSESSMENT & PLAN NOTE
Lab Results   Component Value Date    HGBA1C 7 6 (H) 03/15/2021       Recent Labs     03/25/21  1109 03/25/21  1723 03/25/21  2239 03/26/21  0744   POCGLU 87 150* 80 138       Blood Sugar Average: Last 72 hrs:  · (P) 916 [id number]     Patient has type 2 diabetes on insulin with stage IV chronic disease      Blood sugars are controlled, continue same dose of Lantus and sliding scale insulin

## 2021-03-27 LAB
ANION GAP SERPL CALCULATED.3IONS-SCNC: 13 MMOL/L (ref 4–13)
BUN SERPL-MCNC: 82 MG/DL (ref 5–25)
CALCIUM SERPL-MCNC: 8.3 MG/DL (ref 8.3–10.1)
CHLORIDE SERPL-SCNC: 107 MMOL/L (ref 100–108)
CO2 SERPL-SCNC: 22 MMOL/L (ref 21–32)
CREAT SERPL-MCNC: 4.93 MG/DL (ref 0.6–1.3)
ERYTHROCYTE [DISTWIDTH] IN BLOOD BY AUTOMATED COUNT: 16.9 % (ref 11.6–15.1)
GFR SERPL CREATININE-BSD FRML MDRD: 9 ML/MIN/1.73SQ M
GLUCOSE SERPL-MCNC: 148 MG/DL (ref 65–140)
GLUCOSE SERPL-MCNC: 170 MG/DL (ref 65–140)
GLUCOSE SERPL-MCNC: 214 MG/DL (ref 65–140)
GLUCOSE SERPL-MCNC: 265 MG/DL (ref 65–140)
GLUCOSE SERPL-MCNC: 84 MG/DL (ref 65–140)
GLUCOSE SERPL-MCNC: 87 MG/DL (ref 65–140)
HCT VFR BLD AUTO: 25 % (ref 34.8–46.1)
HGB BLD-MCNC: 7.2 G/DL (ref 11.5–15.4)
MCH RBC QN AUTO: 28.1 PG (ref 26.8–34.3)
MCHC RBC AUTO-ENTMCNC: 28.8 G/DL (ref 31.4–37.4)
MCV RBC AUTO: 98 FL (ref 82–98)
PLATELET # BLD AUTO: 186 THOUSANDS/UL (ref 149–390)
PMV BLD AUTO: 10 FL (ref 8.9–12.7)
POTASSIUM SERPL-SCNC: 5 MMOL/L (ref 3.5–5.3)
RBC # BLD AUTO: 2.56 MILLION/UL (ref 3.81–5.12)
SODIUM SERPL-SCNC: 142 MMOL/L (ref 136–145)
WBC # BLD AUTO: 10.08 THOUSAND/UL (ref 4.31–10.16)

## 2021-03-27 PROCEDURE — 85027 COMPLETE CBC AUTOMATED: CPT | Performed by: INTERNAL MEDICINE

## 2021-03-27 PROCEDURE — 82948 REAGENT STRIP/BLOOD GLUCOSE: CPT

## 2021-03-27 PROCEDURE — 80048 BASIC METABOLIC PNL TOTAL CA: CPT | Performed by: INTERNAL MEDICINE

## 2021-03-27 PROCEDURE — 99232 SBSQ HOSP IP/OBS MODERATE 35: CPT | Performed by: INTERNAL MEDICINE

## 2021-03-27 PROCEDURE — 99233 SBSQ HOSP IP/OBS HIGH 50: CPT | Performed by: INTERNAL MEDICINE

## 2021-03-27 RX ADMIN — INSULIN LISPRO 2 UNITS: 100 INJECTION, SOLUTION INTRAVENOUS; SUBCUTANEOUS at 17:38

## 2021-03-27 RX ADMIN — CALCIUM ACETATE 667 MG: 667 CAPSULE ORAL at 08:16

## 2021-03-27 RX ADMIN — HYDRALAZINE HYDROCHLORIDE 75 MG: 25 TABLET, FILM COATED ORAL at 08:15

## 2021-03-27 RX ADMIN — CALCIUM ACETATE 667 MG: 667 CAPSULE ORAL at 17:39

## 2021-03-27 RX ADMIN — AMLODIPINE BESYLATE 5 MG: 5 TABLET ORAL at 08:16

## 2021-03-27 RX ADMIN — SODIUM BICARBONATE 650 MG TABLET 1300 MG: at 13:34

## 2021-03-27 RX ADMIN — CARVEDILOL 25 MG: 12.5 TABLET, FILM COATED ORAL at 08:16

## 2021-03-27 RX ADMIN — CARVEDILOL 25 MG: 12.5 TABLET, FILM COATED ORAL at 17:39

## 2021-03-27 RX ADMIN — BUMETANIDE 1 MG: 0.25 INJECTION, SOLUTION INTRAMUSCULAR; INTRAVENOUS at 08:15

## 2021-03-27 RX ADMIN — ATORVASTATIN CALCIUM 40 MG: 40 TABLET, FILM COATED ORAL at 08:16

## 2021-03-27 RX ADMIN — SODIUM BICARBONATE 650 MG TABLET 1300 MG: at 17:39

## 2021-03-27 RX ADMIN — IRON SUCROSE 200 MG: 20 INJECTION, SOLUTION INTRAVENOUS at 09:29

## 2021-03-27 RX ADMIN — BUMETANIDE 1 MG: 0.25 INJECTION, SOLUTION INTRAMUSCULAR; INTRAVENOUS at 17:38

## 2021-03-27 RX ADMIN — INSULIN GLARGINE 25 UNITS: 100 INJECTION, SOLUTION SUBCUTANEOUS at 08:15

## 2021-03-27 RX ADMIN — INSULIN LISPRO 3 UNITS: 100 INJECTION, SOLUTION INTRAVENOUS; SUBCUTANEOUS at 21:46

## 2021-03-27 RX ADMIN — CALCIUM ACETATE 667 MG: 667 CAPSULE ORAL at 13:34

## 2021-03-27 RX ADMIN — SODIUM BICARBONATE 650 MG TABLET 1300 MG: at 08:15

## 2021-03-27 RX ADMIN — HYDRALAZINE HYDROCHLORIDE 75 MG: 25 TABLET, FILM COATED ORAL at 21:43

## 2021-03-27 NOTE — ASSESSMENT & PLAN NOTE
Patient has normocytic anemia  Iron studies revealed anemia chronic disease likely due to chronic disease  Patient denies any signs of bleeding  Hemoglobin is 7 2 this morning  Patient declines blood transfusion  She has no abdominal tenderness and denies abdominal pain  Will continue monitor hemoglobin

## 2021-03-27 NOTE — ASSESSMENT & PLAN NOTE
Lab Results   Component Value Date    EGFR 9 03/27/2021    EGFR 10 03/26/2021    EGFR 11 03/25/2021    CREATININE 4 93 (H) 03/27/2021    CREATININE 4 31 (H) 03/26/2021    CREATININE 4 10 (H) 03/25/2021     Patient has stage IV chronic disease with baseline creatinine between 1 49-2  15  She presented with acute kidney injury  She has nephrotic range proteinuria    Nephrology was consulted    Patient underwent renal biopsy on March 25th  She denies any abdominal pain or signs of bleeding    Renal function is worse today with creatinine 4 9  We are awaiting pathology report    I discussed the case with patient's son at the bedside in detail on March 26th

## 2021-03-27 NOTE — ASSESSMENT & PLAN NOTE
Lab Results   Component Value Date    HGBA1C 7 6 (H) 03/15/2021       Recent Labs     03/26/21  2143 03/27/21  0729 03/27/21  0956 03/27/21  1102   POCGLU 181* 87 170* 148*       Blood Sugar Average: Last 72 hrs:  · (P) 146 125     Patient has type 2 diabetes on insulin with stage IV chronic disease      Blood sugars are controlled, continue same dose of Lantus and sliding scale insulin

## 2021-03-27 NOTE — PROGRESS NOTES
NEPHROLOGY PROGRESS NOTE   Ed Wilson 60 y o  female MRN: [id number]  Unit/Bed#: -01 Encounter: [id number]      ASSESSMENT & PLAN:  1  Acute kidney injury on top of CKD stage 4  Status post kidney biopsy on 03/25, awaiting final results  Renal function unfortunately worsening  Patient does not have any uremic symptoms, no urgent indication for dialysis at the moment  Continue with medical management  Avoid relative hypotension  Follow daily labs  2  Chronic kidney disease likely related to diabetes versus obesity related FSGS    3  Anemia, recheck hemoglobin is stable, patient refused blood transfusion yesterday  Continue with Venofer  Monitor H&H     4  Electrolytes stable  5  Hypertension, blood pressure is stable, avoid relative hypotension    My plan and recommendations were discussed with Dr Boby Rizzo from Internal Medicine team    SUBJECTIVE:  Patient seen and examined, big family at bedside  Patient denies any significant complaint, no chest pain, no shortness of breaths, no nausea, no vomiting, eating well        OBJECTIVE:  Current Weight: Weight - Scale: 124 kg (274 lb 6 4 oz)  Vitals:    03/27/21 0900   BP: 127/59   Pulse: 59   Resp: 18   Temp: 97 8 °F (36 6 °C)   SpO2: 92%       Intake/Output Summary (Last 24 hours) at 3/27/2021 1455  Last data filed at 3/27/2021 0600  Gross per 24 hour   Intake --   Output 200 ml   Net -200 ml     General:  Morbidly obese, cooperative, in not acute distress  Eyes: conjunctivae pale, anicteric sclerae  ENT: lips and mucous membranes moist, on room air  Neck: supple, no JVD  Chest: clear breath sounds bilateral, no crackles, ronchus or wheezings  CVS: distinct S1 & S2, normal rate, regular rhythm  Abdomen:  Obese, non-tender, non-distended, normoactive bowel sounds  Extremities: 1-2+ edema of both legs  Skin: no rash, legs wrapped  Neuro: awake, alert, oriented        Medications:    Current Facility-Administered Medications:     acetaminophen (TYLENOL) tablet 650 mg, 650 mg, Oral, Q6H PRN, Twin Aguilar PA-C, 650 mg at 03/17/21 2320    amLODIPine (NORVASC) tablet 5 mg, 5 mg, Oral, Daily, Vidhya Riddles, CRNP, 5 mg at 03/27/21 0816    atorvastatin (LIPITOR) tablet 40 mg, 40 mg, Oral, Daily, Sarah Sanchez PA-C, 40 mg at 03/27/21 0816    bumetanide (BUMEX) injection 1 mg, 1 mg, Intravenous, BID, Romy K Mathew, DO, 1 mg at 03/27/21 0815    calcium acetate (PHOSLO) capsule 667 mg, 667 mg, Oral, TID With Meals, Carmelo Holiday, CRNP, 667 mg at 03/27/21 1334    carvedilol (COREG) tablet 25 mg, 25 mg, Oral, BID, Vidhya Riddles, CRNP, 25 mg at 03/27/21 0816    dextrose 50 % IV solution 25 mL, 25 mL, Intravenous, Once, Arekarrie Moh, DO    diphenhydrAMINE (BENADRYL) tablet 25 mg, 25 mg, Oral, Q6H PRN, Twin Aguilar PA-C, 25 mg at 03/17/21 0545    hydrALAZINE (APRESOLINE) tablet 75 mg, 75 mg, Oral, BID, Vidhya Riddles, CRNP, 75 mg at 03/27/21 0815    insulin glargine (LANTUS) subcutaneous injection 25 Units 0 25 mL, 25 Units, Subcutaneous, QANova MD, 25 Units at 03/27/21 0815    insulin lispro (HumaLOG) 100 units/mL subcutaneous injection 1-6 Units, 1-6 Units, Subcutaneous, TID AC, 2 Units at 03/26/21 1724 **AND** Fingerstick Glucose (POCT), , , TID ACSarah PA-C    insulin lispro (HumaLOG) 100 units/mL subcutaneous injection 1-6 Units, 1-6 Units, Subcutaneous, HS, Sarah Sanchez PA-C, 1 Units at 03/26/21 2213    ondansetron (ZOFRAN) injection 4 mg, 4 mg, Intravenous, Q6H PRN, Jean Marie Don MD, 4 mg at 03/25/21 1221    oxyCODONE (ROXICODONE) IR tablet 5 mg, 5 mg, Oral, Q6H PRN, Jean Marie Don MD    sodium bicarbonate tablet 1,300 mg, 1,300 mg, Oral, TID after meals, Harish Thurston PA-C, 1,300 mg at 03/27/21 1334    Invasive Devices:        Lab Results:   Results from last 7 days   Lab Units 03/27/21  0521 03/26/21  1915 03/26/21  1446 03/26/21  0601 03/25/21  0411  03/23/21  0518 03/22/21  0417   WBC Thousand/uL 10 08  --   --  13 83* 10 26* < > 9 84 9 79   HEMOGLOBIN g/dL 7 2* 6 7* 7 0* 6 7* 7 1*   < > 7 3* 7 3*   HEMATOCRIT % 25 0* 23 2* 24 4* 23 0* 24 2*   < > 24 1* 24 4*   PLATELETS Thousands/uL 186  --   --  201 215   < > 207 197   SODIUM mmol/L 142  --   --  143 146*   < > 145 146*   POTASSIUM mmol/L 5 0  --   --  4 8 4 7   < > 4 3 4 4   CHLORIDE mmol/L 107  --   --  108 110*   < > 110* 112*   CO2 mmol/L 22  --   --  23 25   < > 21 18*   BUN mg/dL 82*  --   --  76* 77*   < > 73* 75*   CREATININE mg/dL 4 93*  --   --  4 31* 4 10*   < > 4 24* 4 50*   CALCIUM mg/dL 8 3  --   --  8 0* 8 0*   < > 7 9* 7 9*   MAGNESIUM mg/dL  --   --   --   --   --   --   --  2 1   PHOSPHORUS mg/dL  --   --   --  4 8* 3 7  --  3 9  --    ALK PHOS U/L  --   --   --   --   --   --  94  --    ALT U/L  --   --   --   --   --   --  26  --    AST U/L  --   --   --   --   --   --  22  --     < > = values in this interval not displayed  Previous work up:  See previous notes      Portions of the record may have been created with voice recognition software  Occasional wrong word or "sound a like" substitutions may have occurred due to the inherent limitations of voice recognition software  Read the chart carefully and recognize, using context, where substitutions have occurred  If you have any questions, please contact the dictating provider

## 2021-03-27 NOTE — PROGRESS NOTES
[place]  Progress Note - Loly Rodriguez 1955, 60 y o  female MRN: [id number]  Unit/Bed#: -01 Encounter: [id number]  Primary Care Provider: Fei Chow DO   Date and time admitted to hospital: 3/15/2021  7:09 PM    Acute kidney injury superimposed on chronic kidney disease [facility])  Assessment & Plan  Lab Results   Component Value Date    EGFR 9 03/27/2021    EGFR 10 03/26/2021    EGFR 11 03/25/2021    CREATININE 4 93 (H) 03/27/2021    CREATININE 4 31 (H) 03/26/2021    CREATININE 4 10 (H) 03/25/2021     Patient has stage IV chronic disease with baseline creatinine between 1 49-2  15  She presented with acute kidney injury  She has nephrotic range proteinuria  Nephrology was consulted    Patient underwent renal biopsy on March 25th  She denies any abdominal pain or signs of bleeding    Renal function is worse today with creatinine 4 9  We are awaiting pathology report    I discussed the case with patient's son at the bedside in detail on March 26th      Type 2 diabetes mellitus with stage 4 chronic kidney disease, with long-term current use of insulin [facility])  Assessment & Plan  Lab Results   Component Value Date    HGBA1C 7 6 (H) 03/15/2021       Recent Labs     03/26/21  2143 03/27/21  0729 03/27/21  0956 03/27/21  1102   POCGLU 181* 87 170* 148*       Blood Sugar Average: Last 72 hrs:  · (P) 146 125     Patient has type 2 diabetes on insulin with stage IV chronic disease  Blood sugars are controlled, continue same dose of Lantus and sliding scale insulin    Anemia due to stage 4 chronic kidney disease [facility])  Assessment & Plan  Patient has normocytic anemia  Iron studies revealed anemia chronic disease likely due to chronic disease  Patient denies any signs of bleeding  Hemoglobin is 7 2 this morning  Patient declines blood transfusion  She has no abdominal tenderness and denies abdominal pain  Will continue monitor hemoglobin          VTE Prophylaxis: in place    Patient Centered Rounds: I rounded with patient's nurse    Current Length of Stay: 12 day(s)    Current Patient Status: Inpatient    Certification Statement: Pt requires additional inpatient hospital stay due to: see assessment and plan        Subjective:   Patient denies any abdominal pain, signs of GI or  bleeding  Denies chest pain or shortness of breath      All other ROS are negative    Objective:     Vitals:   Temp (24hrs), Av 8 °F (36 6 °C), Min:97 5 °F (36 4 °C), Max:98 3 °F (36 8 °C)    Temp:  [97 5 °F (36 4 °C)-98 3 °F (36 8 °C)] 97 8 °F (36 6 °C)  HR:  [59-62] 59  Resp:  [15-18] 18  BP: (119-151)/(57-80) 127/59  SpO2:  [92 %-100 %] 92 %  Body mass index is 47 1 kg/m²  Input and Output Summary (last 24 hours): Intake/Output Summary (Last 24 hours) at 3/27/2021 1322  Last data filed at 3/27/2021 0600  Gross per 24 hour   Intake --   Output 200 ml   Net -200 ml       Physical Exam:     Physical Exam  HENT:      Head: Normocephalic  Eyes:      Conjunctiva/sclera: Conjunctivae normal    Cardiovascular:      Rate and Rhythm: Normal rate and regular rhythm  Heart sounds: Murmur (Systolic murmur is heard) present  Pulmonary:      Effort: No respiratory distress  Breath sounds: No wheezing or rales  Abdominal:      General: Bowel sounds are normal       Palpations: Abdomen is soft  Tenderness: There is no abdominal tenderness  Hernia: A hernia ( ventral hernia is nontender) is present  Neurological:      Mental Status: She is alert and oriented to person, place, and time  Cranial Nerves: No cranial nerve deficit  Comments: Moves all extremities, speech is no             I personally reviewed labs and imaging reports for today        Last 24 Hours Medication List:   Current Facility-Administered Medications   Medication Dose Route Frequency Provider Last Rate    acetaminophen  650 mg Oral Q6H PRN Christiane Smith PA-C      amLODIPine  5 mg Oral Daily Ángel Meza HOWARD Alvarado      atorvastatin  40 mg Oral Daily Anabelle Asher PA-C      bumetanide  1 mg Intravenous BID Dallas Furrow, DO      calcium acetate  667 mg Oral TID With Meals HOWARD Peñaloza      carvedilol  25 mg Oral BID HOWARD Gutierrez      dextrose  25 mL Intravenous Once Jerrald Grave, DO      diphenhydrAMINE  25 mg Oral Q6H PRN Anabelle Asher PA-C      hydrALAZINE  75 mg Oral BID HOWARD Gutierrez      insulin glargine  25 Units Subcutaneous QAM Ronaldo Sarmiento MD      insulin lispro  1-6 Units Subcutaneous TID AC Sarah Sanchez PA-C      insulin lispro  1-6 Units Subcutaneous HS Sarah Sanchez PA-C      ondansetron  4 mg Intravenous Q6H PRN Brittany Melendez MD      oxyCODONE  5 mg Oral Q6H PRN Brittany Melendez MD      sodium bicarbonate  1,300 mg Oral TID after meals Dheeraj Mendoza PA-C            Today, Patient Was Seen By: Ronaldo Sarmiento MD    ** Please Note: Dictation voice to text software may have been used in the creation of this document   **

## 2021-03-28 LAB
ANION GAP SERPL CALCULATED.3IONS-SCNC: 12 MMOL/L (ref 4–13)
BUN SERPL-MCNC: 89 MG/DL (ref 5–25)
CALCIUM SERPL-MCNC: 7.9 MG/DL (ref 8.3–10.1)
CHLORIDE SERPL-SCNC: 107 MMOL/L (ref 100–108)
CO2 SERPL-SCNC: 26 MMOL/L (ref 21–32)
CREAT SERPL-MCNC: 5.49 MG/DL (ref 0.6–1.3)
ERYTHROCYTE [DISTWIDTH] IN BLOOD BY AUTOMATED COUNT: 16.9 % (ref 11.6–15.1)
GFR SERPL CREATININE-BSD FRML MDRD: 8 ML/MIN/1.73SQ M
GLUCOSE SERPL-MCNC: 121 MG/DL (ref 65–140)
GLUCOSE SERPL-MCNC: 136 MG/DL (ref 65–140)
GLUCOSE SERPL-MCNC: 151 MG/DL (ref 65–140)
GLUCOSE SERPL-MCNC: 161 MG/DL (ref 65–140)
GLUCOSE SERPL-MCNC: 94 MG/DL (ref 65–140)
HCT VFR BLD AUTO: 24.8 % (ref 34.8–46.1)
HGB BLD-MCNC: 7.2 G/DL (ref 11.5–15.4)
MCH RBC QN AUTO: 28.5 PG (ref 26.8–34.3)
MCHC RBC AUTO-ENTMCNC: 29 G/DL (ref 31.4–37.4)
MCV RBC AUTO: 98 FL (ref 82–98)
PLATELET # BLD AUTO: 185 THOUSANDS/UL (ref 149–390)
PMV BLD AUTO: 10.6 FL (ref 8.9–12.7)
POTASSIUM SERPL-SCNC: 5.2 MMOL/L (ref 3.5–5.3)
RBC # BLD AUTO: 2.53 MILLION/UL (ref 3.81–5.12)
SODIUM SERPL-SCNC: 145 MMOL/L (ref 136–145)
WBC # BLD AUTO: 10.57 THOUSAND/UL (ref 4.31–10.16)

## 2021-03-28 PROCEDURE — 99233 SBSQ HOSP IP/OBS HIGH 50: CPT | Performed by: PHYSICIAN ASSISTANT

## 2021-03-28 PROCEDURE — 80048 BASIC METABOLIC PNL TOTAL CA: CPT | Performed by: INTERNAL MEDICINE

## 2021-03-28 PROCEDURE — 99233 SBSQ HOSP IP/OBS HIGH 50: CPT | Performed by: INTERNAL MEDICINE

## 2021-03-28 PROCEDURE — 82948 REAGENT STRIP/BLOOD GLUCOSE: CPT

## 2021-03-28 PROCEDURE — 85027 COMPLETE CBC AUTOMATED: CPT | Performed by: INTERNAL MEDICINE

## 2021-03-28 RX ORDER — HEPARIN SODIUM 5000 [USP'U]/ML
5000 INJECTION, SOLUTION INTRAVENOUS; SUBCUTANEOUS EVERY 8 HOURS SCHEDULED
Status: DISCONTINUED | OUTPATIENT
Start: 2021-03-28 | End: 2021-04-05 | Stop reason: HOSPADM

## 2021-03-28 RX ADMIN — INSULIN LISPRO 1 UNITS: 100 INJECTION, SOLUTION INTRAVENOUS; SUBCUTANEOUS at 08:47

## 2021-03-28 RX ADMIN — BUMETANIDE 1 MG: 0.25 INJECTION, SOLUTION INTRAMUSCULAR; INTRAVENOUS at 17:50

## 2021-03-28 RX ADMIN — SODIUM BICARBONATE 650 MG TABLET 1300 MG: at 17:50

## 2021-03-28 RX ADMIN — ATORVASTATIN CALCIUM 40 MG: 40 TABLET, FILM COATED ORAL at 08:45

## 2021-03-28 RX ADMIN — CARVEDILOL 25 MG: 12.5 TABLET, FILM COATED ORAL at 08:45

## 2021-03-28 RX ADMIN — CALCIUM ACETATE 667 MG: 667 CAPSULE ORAL at 12:41

## 2021-03-28 RX ADMIN — CALCIUM ACETATE 667 MG: 667 CAPSULE ORAL at 08:45

## 2021-03-28 RX ADMIN — INSULIN LISPRO 3 UNITS: 100 INJECTION, SOLUTION INTRAVENOUS; SUBCUTANEOUS at 08:47

## 2021-03-28 RX ADMIN — INSULIN LISPRO 3 UNITS: 100 INJECTION, SOLUTION INTRAVENOUS; SUBCUTANEOUS at 12:42

## 2021-03-28 RX ADMIN — HYDRALAZINE HYDROCHLORIDE 75 MG: 25 TABLET, FILM COATED ORAL at 08:46

## 2021-03-28 RX ADMIN — SODIUM BICARBONATE 650 MG TABLET 1300 MG: at 08:45

## 2021-03-28 RX ADMIN — SODIUM BICARBONATE 650 MG TABLET 1300 MG: at 12:41

## 2021-03-28 RX ADMIN — HYDRALAZINE HYDROCHLORIDE 75 MG: 25 TABLET, FILM COATED ORAL at 22:02

## 2021-03-28 RX ADMIN — HEPARIN SODIUM 5000 UNITS: 5000 INJECTION INTRAVENOUS; SUBCUTANEOUS at 22:03

## 2021-03-28 RX ADMIN — HEPARIN SODIUM 5000 UNITS: 5000 INJECTION INTRAVENOUS; SUBCUTANEOUS at 08:45

## 2021-03-28 RX ADMIN — INSULIN LISPRO 1 UNITS: 100 INJECTION, SOLUTION INTRAVENOUS; SUBCUTANEOUS at 12:42

## 2021-03-28 RX ADMIN — BUMETANIDE 1 MG: 0.25 INJECTION, SOLUTION INTRAMUSCULAR; INTRAVENOUS at 08:45

## 2021-03-28 RX ADMIN — CALCIUM ACETATE 667 MG: 667 CAPSULE ORAL at 17:49

## 2021-03-28 RX ADMIN — INSULIN GLARGINE 25 UNITS: 100 INJECTION, SOLUTION SUBCUTANEOUS at 08:46

## 2021-03-28 RX ADMIN — HEPARIN SODIUM 5000 UNITS: 5000 INJECTION INTRAVENOUS; SUBCUTANEOUS at 17:50

## 2021-03-28 RX ADMIN — AMLODIPINE BESYLATE 5 MG: 5 TABLET ORAL at 08:45

## 2021-03-28 RX ADMIN — INSULIN LISPRO 3 UNITS: 100 INJECTION, SOLUTION INTRAVENOUS; SUBCUTANEOUS at 17:51

## 2021-03-28 RX ADMIN — CARVEDILOL 25 MG: 12.5 TABLET, FILM COATED ORAL at 17:49

## 2021-03-28 NOTE — PROGRESS NOTES
Progress Note - Podiatry  Autumn Gutierrez 60 y o  female MRN: [id number]  Unit/Bed#: -01 Encounter: [id number]    Assessment/Plan:    1  Skin ulceration left heel partial depth with DM2   -overall improving   - Continue with Maxorb Ag every other day  - Patient should follow-up at [address]  upon discharge  2   Cellulitis bilateral legs              -secondary to #3 getting infected              -diminishing erythema and +3 edema bilateral persists  3  Venous hypertension with ulceration and inflammation bilateral legs   -nursing advised to encourage elevation of extremities and to avoid dependence at all times  -multiple wounds bilateral legs continue to improve                 -continue with Xeroform, ABDs, Kerlix, and six-inch Ace wraps QOD   -dressing changes per nursing staff  4  Noncompliance with medication, anemia, ADI, essential hypertension, morbid obesity, DM2   -status post kidney biopsy, results pending              -managed per Internal Medicine and Nephrology      Subjective/Objective   Chief Complaint:   Chief Complaint   Patient presents with    Foot Pain     pt c/o left heel pain  pt has had pain for few days  pt also has ulcers on top of left foot  Subjective:  61-year-old white female resting comfortably in bedside recliner with legs dependent  Denies any new pain or discomfort from legs  Relates legs overall are feeling much better  Denies any fever shortness of breath  Blood pressure 139/68, pulse 62, temperature 98 °F (36 7 °C), temperature source Oral, resp  rate 19, height 5' 4" (1 626 m), weight 124 kg (274 lb 6 4 oz), SpO2 96 %, not currently breastfeeding  ,Body mass index is 47 1 kg/m²      Invasive Devices     Peripheral Intravenous Line            Peripheral IV 03/27/21 Right Forearm 1 day                Physical Exam:   General appearance: alert, cooperative and no distress  Neuro/Vascular: Normal strength  Sensation and reflexes:  Diminished epicritic sensation  Pulses: Right: DP 0/4, PT 0/4, Left: DP 0/4, PT 0/4  Capillary Filling:  3 Sec, Edema:  + 2 - +3 edema bilateral  Extremity: Ulcers/Wounds:   · Left heel:  Partial depth ulcerative breakdown posterior lateral aspect, minimal serosanguineous drainage, 50% yellow, 50% pink/red, diminishing size, no evidence of acute infection  Less pain with palpation  · Bilateral legs:  Multiple partial depth venous hypertension ulcerations continue to diminish in size with much less  drainage  Erythema appears to have resolved  Overall legs are improving dramatically  Edema appears to have increased in both legs  Labs and other studies:   CBC w/diff  Results from last 7 days   Lab Units 03/28/21  0556  03/26/21  0601   WBC Thousand/uL 10 57*   < > 13 83*   HEMOGLOBIN g/dL 7 2*   < > 6 7*   HEMATOCRIT % 24 8*   < > 23 0*   PLATELETS Thousands/uL 185   < > 201   NEUTROS PCT %  --   --  82*   LYMPHS PCT %  --   --  6*   MONOS PCT %  --   --  7   EOS PCT %  --   --  4    < > = values in this interval not displayed  BMP  Results from last 7 days   Lab Units 03/28/21  0556   POTASSIUM mmol/L 5 2   CHLORIDE mmol/L 107   CO2 mmol/L 26   BUN mg/dL 89*   CREATININE mg/dL 5 49*   CALCIUM mg/dL 7 9*     CMP  Results from last 7 days   Lab Units 03/28/21  0556  03/23/21  0518   POTASSIUM mmol/L 5 2   < > 4 3   CHLORIDE mmol/L 107   < > 110*   CO2 mmol/L 26   < > 21   BUN mg/dL 89*   < > 73*   CREATININE mg/dL 5 49*   < > 4 24*   CALCIUM mg/dL 7 9*   < > 7 9*   ALK PHOS U/L  --   --  94   ALT U/L  --   --  26   AST U/L  --   --  22    < > = values in this interval not displayed  @Culture@  Lab Results   Component Value Date    BLOODCX No Growth After 5 Days  03/15/2021    BLOODCX No Growth After 5 Days   03/15/2021    URINECX <10,000 cfu/ml  03/16/2021     No results found for: WOUNDCULT      Current Facility-Administered Medications:     acetaminophen (TYLENOL) tablet 650 mg, 650 mg, Oral, Q6H PRN, Sarah Harper PA-C, 650 mg at 03/17/21 2320    amLODIPine (NORVASC) tablet 5 mg, 5 mg, Oral, Daily, HOWARD Rosario, 5 mg at 03/28/21 0845    atorvastatin (LIPITOR) tablet 40 mg, 40 mg, Oral, Daily, Sarah Sanchez PA-C, 40 mg at 03/28/21 0845    bumetanide (BUMEX) injection 1 mg, 1 mg, Intravenous, BID, Romy Carmona DO, 1 mg at 03/28/21 0845    calcium acetate (PHOSLO) capsule 667 mg, 667 mg, Oral, TID With Meals, HOWARD Langston, 667 mg at 03/28/21 1241    carvedilol (COREG) tablet 25 mg, 25 mg, Oral, BID, HOWARD Rosario, 25 mg at 03/28/21 0845    dextrose 50 % IV solution 25 mL, 25 mL, Intravenous, Once, Rubia Taylor DO    diphenhydrAMINE (BENADRYL) tablet 25 mg, 25 mg, Oral, Q6H PRN, Sarah Sanchez PA-C, 25 mg at 03/17/21 0545    heparin (porcine) subcutaneous injection 5,000 Units, 5,000 Units, Subcutaneous, Q8H [address], Noel Cornejo MD, 5,000 Units at 03/28/21 0845    hydrALAZINE (APRESOLINE) tablet 75 mg, 75 mg, Oral, BID, HOWARD Rosario, 75 mg at 03/28/21 0846    insulin glargine (LANTUS) subcutaneous injection 25 Units 0 25 mL, 25 Units, Subcutaneous, QAM, Noel Cornejo MD, 25 Units at 03/28/21 0846    insulin lispro (HumaLOG) 100 units/mL subcutaneous injection 1-6 Units, 1-6 Units, Subcutaneous, TID AC, 1 Units at 03/28/21 1242 **AND** Fingerstick Glucose (POCT), , , TID AC, Sarah Sanchez PA-C    insulin lispro (HumaLOG) 100 units/mL subcutaneous injection 1-6 Units, 1-6 Units, Subcutaneous, HS, Sarah Sanchez PA-C, 3 Units at 03/27/21 2146    insulin lispro (HumaLOG) 100 units/mL subcutaneous injection 3 Units, 3 Units, Subcutaneous, TID With Meals, Noel Cornejo MD, 3 Units at 03/28/21 1242    ondansetron (ZOFRAN) injection 4 mg, 4 mg, Intravenous, Q6H PRN, Arnol Herzog MD, 4 mg at 03/25/21 1221    oxyCODONE (ROXICODONE) IR tablet 5 mg, 5 mg, Oral, Q6H PRN, Arnol Herzog MD    sodium bicarbonate tablet 1,300 mg, 1,300 mg, Oral, TID after meals, Rashel Huang PA-C, 1,300 mg at 03/28/21 1241    Imaging: I have personally reviewed pertinent films in PACS  EKG, Pathology, and Other Studies: I have personally reviewed pertinent reports    VTE Pharmacologic Prophylaxis: Heparin  VTE Mechanical Prophylaxis: reason for no mechanical VTE prophylaxis Bilateral leg wounds    Tyrone Moreno DPM

## 2021-03-28 NOTE — ASSESSMENT & PLAN NOTE
Lab Results   Component Value Date    EGFR 8 03/28/2021    EGFR 9 03/27/2021    EGFR 10 03/26/2021    CREATININE 5 49 (H) 03/28/2021    CREATININE 4 93 (H) 03/27/2021    CREATININE 4 31 (H) 03/26/2021     Patient has stage IV chronic disease with baseline creatinine between 1 49-2  15  She presented with acute kidney injury  She has nephrotic range proteinuria    Nephrology was consulted    Patient underwent renal biopsy on March 25th  She denies any abdominal pain or signs of bleeding    Renal function is worse today with creatinine of 5 49  We are awaiting pathology report  Nephrology will likely start hemodialysis this week

## 2021-03-28 NOTE — PLAN OF CARE
Problem: Potential for Falls  Goal: Patient will remain free of falls  Description: INTERVENTIONS:  - Assess patient frequently for physical needs  -  Identify cognitive and physical deficits and behaviors that affect risk of falls    -  Palmer fall precautions as indicated by assessment   - Educate patient/family on patient safety including physical limitations  - Instruct patient to call for assistance with activity based on assessment  - Modify environment to reduce risk of injury  - Consider OT/PT consult to assist with strengthening/mobility  Outcome: Progressing     Problem: PAIN - ADULT  Goal: Verbalizes/displays adequate comfort level or baseline comfort level  Description: Interventions:  - Encourage patient to monitor pain and request assistance  - Assess pain using appropriate pain scale  - Administer analgesics based on type and severity of pain and evaluate response  - Implement non-pharmacological measures as appropriate and evaluate response  - Consider cultural and social influences on pain and pain management  - Notify physician/advanced practitioner if interventions unsuccessful or patient reports new pain  Outcome: Progressing     Problem: INFECTION - ADULT  Goal: Absence or prevention of progression during hospitalization  Description: INTERVENTIONS:  - Assess and monitor for signs and symptoms of infection  - Monitor lab/diagnostic results  - Monitor all insertion sites, i e  indwelling lines, tubes, and drains  - Monitor endotracheal if appropriate and nasal secretions for changes in amount and color  - Palmer appropriate cooling/warming therapies per order  - Administer medications as ordered  - Instruct and encourage patient and family to use good hand hygiene technique  - Identify and instruct in appropriate isolation precautions for identified infection/condition  Outcome: Progressing  Goal: Absence of fever/infection during neutropenic period  Description: INTERVENTIONS:  - Monitor WBC    Outcome: Progressing     Problem: SAFETY ADULT  Goal: Patient will remain free of falls  Description: INTERVENTIONS:  - Assess patient frequently for physical needs  -  Identify cognitive and physical deficits and behaviors that affect risk of falls    -  Brock fall precautions as indicated by assessment   - Educate patient/family on patient safety including physical limitations  - Instruct patient to call for assistance with activity based on assessment  - Modify environment to reduce risk of injury  - Consider OT/PT consult to assist with strengthening/mobility  Outcome: Progressing  Goal: Maintain or return to baseline ADL function  Description: INTERVENTIONS:  -  Assess patient's ability to carry out ADLs; assess patient's baseline for ADL function and identify physical deficits which impact ability to perform ADLs (bathing, care of mouth/teeth, toileting, grooming, dressing, etc )  - Assess/evaluate cause of self-care deficits   - Assess range of motion  - Assess patient's mobility; develop plan if impaired  - Assess patient's need for assistive devices and provide as appropriate  - Encourage maximum independence but intervene and supervise when necessary  - Involve family in performance of ADLs  - Assess for home care needs following discharge   - Consider OT consult to assist with ADL evaluation and planning for discharge  - Provide patient education as appropriate  Outcome: Progressing  Goal: Maintain or return mobility status to optimal level  Description: INTERVENTIONS:  - Assess patient's baseline mobility status (ambulation, transfers, stairs, etc )    - Identify cognitive and physical deficits and behaviors that affect mobility  - Identify mobility aids required to assist with transfers and/or ambulation (gait belt, sit-to-stand, lift, walker, cane, etc )  - Brock fall precautions as indicated by assessment  - Record patient progress and toleration of activity level on Mobility SBAR; progress patient to next Phase/Stage  - Instruct patient to call for assistance with activity based on assessment  - Consider rehabilitation consult to assist with strengthening/weightbearing, etc   Outcome: Progressing     Problem: DISCHARGE PLANNING  Goal: Discharge to home or other facility with appropriate resources  Description: INTERVENTIONS:  - Identify barriers to discharge w/patient and caregiver  - Arrange for needed discharge resources and transportation as appropriate  - Identify discharge learning needs (meds, wound care, etc )  - Arrange for interpretive services to assist at discharge as needed  - Refer to Case Management Department for coordinating discharge planning if the patient needs post-hospital services based on physician/advanced practitioner order or complex needs related to functional status, cognitive ability, or social support system  Outcome: Progressing     Problem: Knowledge Deficit  Goal: Patient/family/caregiver demonstrates understanding of disease process, treatment plan, medications, and discharge instructions  Description: Complete learning assessment and assess knowledge base    Interventions:  - Provide teaching at level of understanding  - Provide teaching via preferred learning methods  Outcome: Progressing     Problem: CARDIOVASCULAR - ADULT  Goal: Maintains optimal cardiac output and hemodynamic stability  Description: INTERVENTIONS:  - Monitor I/O, vital signs and rhythm  - Monitor for S/S and trends of decreased cardiac output  - Administer and titrate ordered vasoactive medications to optimize hemodynamic stability  - Assess quality of pulses, skin color and temperature  - Assess for signs of decreased coronary artery perfusion  - Instruct patient to report change in severity of symptoms  Outcome: Progressing  Goal: Absence of cardiac dysrhythmias or at baseline rhythm  Description: INTERVENTIONS:  - Continuous cardiac monitoring, vital signs, obtain 12 lead EKG if ordered  - Administer antiarrhythmic and heart rate control medications as ordered  - Monitor electrolytes and administer replacement therapy as ordered  Outcome: Progressing     Problem: SKIN/TISSUE INTEGRITY - ADULT  Goal: Skin integrity remains intact  Description: INTERVENTIONS  - Identify patients at risk for skin breakdown  - Assess and monitor skin integrity  - Assess and monitor nutrition and hydration status  - Monitor labs (i e  albumin)  - Assess for incontinence   - Turn and reposition patient  - Assist with mobility/ambulation  - Relieve pressure over bony prominences  - Avoid friction and shearing  - Provide appropriate hygiene as needed including keeping skin clean and dry  - Evaluate need for skin moisturizer/barrier cream  - Collaborate with interdisciplinary team (i e  Nutrition, Rehabilitation, etc )   - Patient/family teaching  Outcome: Progressing  Goal: Incision(s), wounds(s) or drain site(s) healing without S/S of infection  Description: INTERVENTIONS  - Assess and document risk factors for skin impairment   - Assess and document dressing, incision, wound bed, drain sites and surrounding tissue  - Consider nutrition services referral as needed  - Oral mucous membranes remain intact  - Provide patient/ family education  Outcome: Progressing  Goal: Oral mucous membranes remain intact  Description: INTERVENTIONS  - Assess oral mucosa and hygiene practices  - Implement preventative oral hygiene regimen  - Implement oral medicated treatments as ordered  - Initiate Nutrition services referral as needed  Outcome: Progressing     Problem: Prexisting or High Potential for Compromised Skin Integrity  Goal: Skin integrity is maintained or improved  Description: INTERVENTIONS:  - Identify patients at risk for skin breakdown  - Assess and monitor skin integrity  - Assess and monitor nutrition and hydration status  - Monitor labs   - Assess for incontinence   - Turn and reposition patient  - Assist with mobility/ambulation  - Relieve pressure over bony prominences  - Avoid friction and shearing  - Provide appropriate hygiene as needed including keeping skin clean and dry  - Evaluate need for skin moisturizer/barrier cream  - Collaborate with interdisciplinary team   - Patient/family teaching  - Consider wound care consult   Outcome: Progressing     Problem: Nutrition/Hydration-ADULT  Goal: Nutrient/Hydration intake appropriate for improving, restoring or maintaining nutritional needs  Description: Monitor and assess patient's nutrition/hydration status for malnutrition  Collaborate with interdisciplinary team and initiate plan and interventions as ordered  Monitor patient's weight and dietary intake as ordered or per policy  Utilize nutrition screening tool and intervene as necessary  Determine patient's food preferences and provide high-protein, high-caloric foods as appropriate       INTERVENTIONS:  - Monitor oral intake, urinary output, labs, and treatment plans  - Assess nutrition and hydration status and recommend course of action  - Evaluate amount of meals eaten  - Assist patient with eating if necessary   - Allow adequate time for meals  - Recommend/ encourage appropriate diets, oral nutritional supplements, and vitamin/mineral supplements  - Order, calculate, and assess calorie counts as needed  - Recommend, monitor, and adjust tube feedings and TPN/PPN based on assessed needs  - Assess need for intravenous fluids  - Provide specific nutrition/hydration education as appropriate  - Include patient/family/caregiver in decisions related to nutrition  Outcome: Progressing     Problem: RESPIRATORY - ADULT  Goal: Achieves optimal ventilation and oxygenation  Description: INTERVENTIONS:  - Assess for changes in respiratory status  - Assess for changes in mentation and behavior  - Position to facilitate oxygenation and minimize respiratory effort  - Oxygen administered by appropriate delivery if ordered  - Initiate smoking cessation education as indicated  - Encourage broncho-pulmonary hygiene including cough, deep breathe, Incentive Spirometry  - Assess the need for suctioning and aspirate as needed  - Assess and instruct to report SOB or any respiratory difficulty  - Respiratory Therapy support as indicated  Outcome: Progressing     Problem: METABOLIC, FLUID AND ELECTROLYTES - ADULT  Goal: Electrolytes maintained within normal limits  Description: INTERVENTIONS:  - Monitor labs and assess patient for signs and symptoms of electrolyte imbalances  - Administer electrolyte replacement as ordered  - Monitor response to electrolyte replacements, including repeat lab results as appropriate  - Instruct patient on fluid and nutrition as appropriate  Outcome: Progressing  Goal: Fluid balance maintained  Description: INTERVENTIONS:  - Monitor labs   - Monitor I/O and WT  - Instruct patient on fluid and nutrition as appropriate  - Assess for signs & symptoms of volume excess or deficit  Outcome: Progressing  Goal: Glucose maintained within target range  Description: INTERVENTIONS:  - Monitor Blood Glucose as ordered  - Assess for signs and symptoms of hyperglycemia and hypoglycemia  - Administer ordered medications to maintain glucose within target range  - Assess nutritional intake and initiate nutrition service referral as needed  Outcome: Progressing     Problem: HEMATOLOGIC - ADULT  Goal: Maintains hematologic stability  Description: INTERVENTIONS  - Assess for signs and symptoms of bleeding or hemorrhage  - Monitor labs  - Administer supportive blood products/factors as ordered and appropriate  Outcome: Progressing     Problem: MUSCULOSKELETAL - ADULT  Goal: Maintain or return mobility to safest level of function  Description: INTERVENTIONS:  - Assess patient's ability to carry out ADLs; assess patient's baseline for ADL function and identify physical deficits which impact ability to perform ADLs (bathing, care of mouth/teeth, toileting, grooming, dressing, etc )  - Assess/evaluate cause of self-care deficits   - Assess range of motion  - Assess patient's mobility  - Assess patient's need for assistive devices and provide as appropriate  - Encourage maximum independence but intervene and supervise when necessary  - Involve family in performance of ADLs  - Assess for home care needs following discharge   - Consider OT consult to assist with ADL evaluation and planning for discharge  - Provide patient education as appropriate  Outcome: Progressing

## 2021-03-28 NOTE — PROGRESS NOTES
NEPHROLOGY PROGRESS NOTE   Thais Loza 60 y o  female MRN: [id number]  Unit/Bed#: -01 Encounter: [id number]  Reason for Consult:     ASSESSMENT/PLAN:  1  Acute kidney injury, POA:  Possibly related to diarrhea, hypotension and ATN with cellulitis  Doubt AIN as urine eosinophils 0     · Creatinine continues to rise  · Workup:  Free light chain 2 02, complements/hepatitis panel/HIV/RPR/rheumatoid factor/AS/anti-GBM/ANCA/BHUMI/cryoglobin unremarkable  · Pending:  AntiPLA 2R  · Urine protein creatinine ratio 4 57 g  · Repeat UA:  Glucose, 3+ protein, 0-1 RBC, 2-4 WBC, occasional bacteria, 2-4 hyaline casts, 5-10 granular cast  · Renal ultrasound:  Slightly enlarged kidneys possibly due to diabetic nephropathy, normal echogenicity  · Status post renal biopsy 3/25  · Suspect edema is related to 3rd spacing from nephrotic syndrome  · No current indication for HD  · Trend creatinine  2  CKD stage 4 versus progression of CKD:  Creatinine normal prior to 2017 but then 2 15 a year ago   · Likely related to diabetes versus obesity but renal biopsy is pending  3  Anion gap metabolic acidosis:  Currently on sodium bicarb 1300mg TID  4  Anemia:  No signs of monoclonal gammopathy on workup  Received venofer this admission  · Status post PRBC, continue to trend  5  Hyperphosphatemia:  continue PhosLo 1 tab t i d  With meals  6  Hypertension:  BP acceptable  Continue amlodipine 5 mg daily, hydralazine 75 mg p o  B i d , Coreg 25 mg p o  B i d   7  Hypernatremia: encourage oral intake, no fluid restriction  8  Volume: currently on bumex 1mg IV BID restarted yesterday    Disposition:  No indication for HD today  May need HD this week if no improvement in symptoms  SUBJECTIVE:  Patient states her appetite is so so  States her breathing is normal   Denies SOB  States LE edema normal for the past few months  Denies nausea  She is tired today but states she didn't sleep at all last night      OBJECTIVE:  Current Weight: Weight - Scale: 124 kg (274 lb 6 4 oz)  Vitals:    03/27/21 1615 03/27/21 2143 03/27/21 2149 03/28/21 0700   BP: 145/65 162/72  139/68   BP Location: Right arm   Right arm   Pulse: 67 64  62   Resp: 15 20  19   Temp: 98 1 °F (36 7 °C) 98 °F (36 7 °C)  98 °F (36 7 °C)   TempSrc: Oral Oral  Oral   SpO2: 98%  96% 96%   Weight:       Height:           Intake/Output Summary (Last 24 hours) at 3/28/2021 1256  Last data filed at 3/28/2021 0800  Gross per 24 hour   Intake 240 ml   Output 300 ml   Net -60 ml     General: NAD  Skin: no rash  Eyes: anicteric  ENMT: mm moist  Neck: no masses  Respiratory: CTAB  Cardiac: RRR  Extremities: + bilateral LE edema  GI: soft nt nd  Neuro: alert awake  Psych: mood and affect appropriate    Medications:    Current Facility-Administered Medications:     acetaminophen (TYLENOL) tablet 650 mg, 650 mg, Oral, Q6H PRN, Sarah Sanchez PA-C, 650 mg at 03/17/21 2320    amLODIPine (NORVASC) tablet 5 mg, 5 mg, Oral, Daily, HOWARD Dickey, 5 mg at 03/28/21 0845    atorvastatin (LIPITOR) tablet 40 mg, 40 mg, Oral, Daily, Sarah Sanchez PA-C, 40 mg at 03/28/21 0845    bumetanide (BUMEX) injection 1 mg, 1 mg, Intravenous, BID, Romy Carmona DO, 1 mg at 03/28/21 0845    calcium acetate (PHOSLO) capsule 667 mg, 667 mg, Oral, TID With Meals, Thereasa Nageotte, CRNP, 667 mg at 03/28/21 1241    carvedilol (COREG) tablet 25 mg, 25 mg, Oral, BID, HOWARD Dickey, 25 mg at 03/28/21 0845    dextrose 50 % IV solution 25 mL, 25 mL, Intravenous, Once, Francois Kawasaki, DO    diphenhydrAMINE (BENADRYL) tablet 25 mg, 25 mg, Oral, Q6H PRN, Sarah Sanchez PA-C, 25 mg at 03/17/21 0545    heparin (porcine) subcutaneous injection 5,000 Units, 5,000 Units, Subcutaneous, Q8H [facility], Lavonne Smith MD, 5,000 Units at 03/28/21 0845    hydrALAZINE (APRESOLINE) tablet 75 mg, 75 mg, Oral, BID, HOWARD Dickey, 75 mg at 03/28/21 0846    insulin glargine (LANTUS) subcutaneous injection 25 Units 0 25 mL, 25 Units, Subcutaneous, QAM, Ronaldo Sarmiento MD, 25 Units at 03/28/21 0846    insulin lispro (HumaLOG) 100 units/mL subcutaneous injection 1-6 Units, 1-6 Units, Subcutaneous, TID AC, 1 Units at 03/28/21 1242 **AND** Fingerstick Glucose (POCT), , , TID AC, Sarah Sanchez PA-C    insulin lispro (HumaLOG) 100 units/mL subcutaneous injection 1-6 Units, 1-6 Units, Subcutaneous, HS, Sarah Sanchez PA-C, 3 Units at 03/27/21 2146    insulin lispro (HumaLOG) 100 units/mL subcutaneous injection 3 Units, 3 Units, Subcutaneous, TID With Meals, Ronaldo Sarmiento MD, 3 Units at 03/28/21 1242    ondansetron (ZOFRAN) injection 4 mg, 4 mg, Intravenous, Q6H PRN, Brittany Melendez MD, 4 mg at 03/25/21 1221    oxyCODONE (ROXICODONE) IR tablet 5 mg, 5 mg, Oral, Q6H PRN, Brittany Melendez MD    sodium bicarbonate tablet 1,300 mg, 1,300 mg, Oral, TID after meals, Dheeraj Mendoza PA-C, 1,300 mg at 03/28/21 1241    Laboratory Results:  Results from last 7 days   Lab Units 03/28/21  0556 03/27/21  0521 03/26/21  1915 03/26/21  1446 03/26/21  0601 03/25/21  0411 03/24/21  0540 03/23/21  0518 03/22/21  0417   WBC Thousand/uL 10 57* 10 08  --   --  13 83* 10 26* 8 92 9 84 9 79   HEMOGLOBIN g/dL 7 2* 7 2* 6 7* 7 0* 6 7* 7 1* 7 6* 7 3* 7 3*   HEMATOCRIT % 24 8* 25 0* 23 2* 24 4* 23 0* 24 2* 25 6* 24 1* 24 4*   PLATELETS Thousands/uL 185 186  --   --  201 215 211 207 197   POTASSIUM mmol/L 5 2 5 0  --   --  4 8 4 7 4 5 4 3 4 4   CHLORIDE mmol/L 107 107  --   --  108 110* 110* 110* 112*   CO2 mmol/L 26 22  --   --  23 25 24 21 18*   BUN mg/dL 89* 82*  --   --  76* 77* 75* 73* 75*   CREATININE mg/dL 5 49* 4 93*  --   --  4 31* 4 10* 4 38* 4 24* 4 50*   CALCIUM mg/dL 7 9* 8 3  --   --  8 0* 8 0* 8 0* 7 9* 7 9*   MAGNESIUM mg/dL  --   --   --   --   --   --   --   --  2 1   PHOSPHORUS mg/dL  --   --   --   --  4 8* 3 7  --  3 9  --      I have personally reviewed the blood work as stated above and in my note

## 2021-03-28 NOTE — PROGRESS NOTES
[place]  Progress Note - Justice Clonts 1955, 60 y o  female MRN: [id number]  Unit/Bed#: -01 Encounter: [id number]  Primary Care Provider: Zoe Becker DO   Date and time admitted to hospital: 3/15/2021  7:09 PM    * Acute on chronic combined systolic and diastolic congestive heart failure (HCC)  Assessment & Plan  Wt Readings from Last 3 Encounters:   03/27/21 124 kg (274 lb 6 4 oz)   02/21/20 117 kg (258 lb)   01/07/20 117 kg (258 lb 12 8 oz)     Patient wasadmitted with increased lower extremity edema due to acute systolic CHF due to noncompliance with medications  Ejection fraction was 45-50% on this admission  Patient was treated with IV Bumex then IV diuretics were held because of worsening renal function then restarted yesterday due to increasing lower extremity edema and requirement for oxygen via nasal cannula which is now at 3 liters/minute as patient's renal function is worsening    Lungs are clear to auscultation but patient has worsening lower extremity edema    Patient is on Bumex 1 mg IV q 12 hours        Acute kidney injury superimposed on chronic kidney disease [facility])  Assessment & Plan  Lab Results   Component Value Date    EGFR 8 03/28/2021    EGFR 9 03/27/2021    EGFR 10 03/26/2021    CREATININE 5 49 (H) 03/28/2021    CREATININE 4 93 (H) 03/27/2021    CREATININE 4 31 (H) 03/26/2021     Patient has stage IV chronic disease with baseline creatinine between 1 49-2  15  She presented with acute kidney injury  She has nephrotic range proteinuria    Nephrology was consulted    Patient underwent renal biopsy on March 25th  She denies any abdominal pain or signs of bleeding    Renal function is worse today with creatinine of 5 49  We are awaiting pathology report  Nephrology will likely start hemodialysis this week    Type 2 diabetes mellitus with stage 4 chronic kidney disease, with long-term current use of insulin [facility])  Assessment & Plan  Lab Results   Component Value Date    HGBA1C 7 6 (H) 03/15/2021       Recent Labs     21  1102 21  1611 21  2142 21  0717   POCGLU 148* 214* 265* 151*       Blood Sugar Average: Last 72 hrs:  · (P) 155 [id number]     Patient has type 2 diabetes on insulin with stage IV chronic disease  Blood sugars are uncontrolled, I am adding Humalog 3 units before meals, continue same dose of Lantus   Anemia due to stage 4 chronic kidney disease [facility])  Assessment & Plan  Patient has normocytic anemia  Iron studies revealed anemia of chronic disease    Patient denies any signs of bleeding  Hemoglobin is 7 2 this morning, stable  Patient declined blood transfusion earlier this this week when hemoglobin was 6 7  She has no abdominal tenderness and denies abdominal pain  I doubt patient would have bleeding after kidney biopsy    Will continue monitor hemoglobin  I am resuming subcutaneous heparin for DVT prevention purposes        VTE Prophylaxis:  I am resuming subcutaneous heparin for DVT prevention    Patient Centered Rounds: I rounded with patient's nurse    Current Length of Stay: 13 day(s)    Current Patient Status: Inpatient    Certification Statement: Pt requires additional inpatient hospital stay due to: see assessment and plan        Subjective:   Patient denies chest pain, pleurisy or shortness of breath on oxygen  Denies any abdominal pain, flank pain, signs of GI or  bleeding  She complains of increasing lower extremity edema    All other ROS are negative    Objective:     Vitals:   Temp (24hrs), Av °F (36 7 °C), Min:97 8 °F (36 6 °C), Max:98 1 °F (36 7 °C)    Temp:  [97 8 °F (36 6 °C)-98 1 °F (36 7 °C)] 98 °F (36 7 °C)  HR:  [59-67] 62  Resp:  [15-20] 19  BP: (127-162)/(59-72) 139/68  SpO2:  [92 %-98 %] 96 %  Body mass index is 47 1 kg/m²  Input and Output Summary (last 24 hours):        Intake/Output Summary (Last 24 hours) at 3/28/2021 6035  Last data filed at 3/28/2021 0700  Gross per 24 hour   Intake --   Output 300 ml   Net -300 ml       Physical Exam:     Physical Exam  HENT:      Head: Normocephalic  Eyes:      Conjunctiva/sclera: Conjunctivae normal    Cardiovascular:      Rate and Rhythm: Normal rate and regular rhythm  Heart sounds: Murmur ( systolic murmur is present) present  Pulmonary:      Effort: No respiratory distress  Breath sounds: No wheezing or rales  Abdominal:      General: Bowel sounds are normal       Palpations: Abdomen is soft  Tenderness: There is no abdominal tenderness  There is no right CVA tenderness or left CVA tenderness  Skin:     General: Skin is warm and dry  Comments: At least 1+ bilateral lower extremity edema is present   Neurological:      Mental Status: She is alert and oriented to person, place, and time  Mental status is at baseline  Psychiatric:         Mood and Affect: Mood normal              I personally reviewed labs and imaging reports for today        Last 24 Hours Medication List:   Current Facility-Administered Medications   Medication Dose Route Frequency Provider Last Rate    acetaminophen  650 mg Oral Q6H PRN Mikayla Mock PA-C      amLODIPine  5 mg Oral Daily HOWARD Guerrero      atorvastatin  40 mg Oral Daily Mikayla Mock PA-C      bumetanide  1 mg Intravenous BID Romy TAWANA XiongMathew, DO      calcium acetate  667 mg Oral TID With Meals HOWARD Hernandez      carvedilol  25 mg Oral BID HOWARD Guerrero      dextrose  25 mL Intravenous Once Othelia Reynold, DO      diphenhydrAMINE  25 mg Oral Q6H PRN Mikayla Mock PA-C      heparin (porcine)  5,000 Units Subcutaneous [facility] Tonia Mahmood MD      hydrALAZINE  75 mg Oral BID HOWARD Guerrero      insulin glargine  25 Units Subcutaneous QAM Tonia Mahmood MD      insulin lispro  1-6 Units Subcutaneous TID AC Sarah Sanchez PA-C      insulin lispro  1-6 Units Subcutaneous HS Sarah Sanchez PA-C      insulin lispro  3 Units Subcutaneous TID With Blayne Velásquez MD      ondansetron  4 mg Intravenous Q6H PRN Viri Calhoun MD      oxyCODONE  5 mg Oral Q6H PRN Viri Calhoun MD      sodium bicarbonate  1,300 mg Oral TID after meals Juliana Gómez PA-C            Today, Patient Was Seen By: Skylar Quick MD    ** Please Note: Dictation voice to text software may have been used in the creation of this document   **

## 2021-03-28 NOTE — ASSESSMENT & PLAN NOTE
Wt Readings from Last 3 Encounters:   03/27/21 124 kg (274 lb 6 4 oz)   02/21/20 117 kg (258 lb)   01/07/20 117 kg (258 lb 12 8 oz)     Patient wasadmitted with increased lower extremity edema due to acute systolic CHF due to noncompliance with medications  Ejection fraction was 45-50% on this admission    Patient was treated with IV Bumex then IV diuretics were held because of worsening renal function then restarted yesterday due to increasing lower extremity edema and requirement for oxygen via nasal cannula which is now at 3 liters/minute as patient's renal function is worsening    Lungs are clear to auscultation but patient has worsening lower extremity edema    Patient is on Bumex 1 mg IV q 12 hours

## 2021-03-28 NOTE — ASSESSMENT & PLAN NOTE
Patient has normocytic anemia  Iron studies revealed anemia of chronic disease    Patient denies any signs of bleeding  Hemoglobin is 7 2 this morning, stable  Patient declined blood transfusion earlier this this week when hemoglobin was 6 7  She has no abdominal tenderness and denies abdominal pain  I doubt patient would have bleeding after kidney biopsy    Will continue monitor hemoglobin      I am resuming subcutaneous heparin for DVT prevention purposes

## 2021-03-28 NOTE — ASSESSMENT & PLAN NOTE
Lab Results   Component Value Date    HGBA1C 7 6 (H) 03/15/2021       Recent Labs     03/27/21  1102 03/27/21  1611 03/27/21  2142 03/28/21  0717   POCGLU 148* 214* 265* 151*       Blood Sugar Average: Last 72 hrs:  · (P) 96 [id number]     Patient has type 2 diabetes on insulin with stage IV chronic disease  Blood sugars are uncontrolled, I am adding Humalog 3 units before meals, continue same dose of Lantus

## 2021-03-29 LAB
ANION GAP SERPL CALCULATED.3IONS-SCNC: 10 MMOL/L (ref 4–13)
BUN SERPL-MCNC: 100 MG/DL (ref 5–25)
CALCIUM SERPL-MCNC: 8 MG/DL (ref 8.3–10.1)
CHLORIDE SERPL-SCNC: 108 MMOL/L (ref 100–108)
CO2 SERPL-SCNC: 27 MMOL/L (ref 21–32)
CREAT SERPL-MCNC: 5.95 MG/DL (ref 0.6–1.3)
GFR SERPL CREATININE-BSD FRML MDRD: 7 ML/MIN/1.73SQ M
GLUCOSE SERPL-MCNC: 142 MG/DL (ref 65–140)
GLUCOSE SERPL-MCNC: 72 MG/DL (ref 65–140)
GLUCOSE SERPL-MCNC: 91 MG/DL (ref 65–140)
GLUCOSE SERPL-MCNC: 93 MG/DL (ref 65–140)
GLUCOSE SERPL-MCNC: 95 MG/DL (ref 65–140)
HBV CORE AB SER QL: NORMAL
HBV CORE IGM SER QL: NORMAL
HBV SURFACE AB SER-ACNC: <3.1 MIU/ML
HBV SURFACE AG SER QL: NORMAL
HCV AB SER QL: NORMAL
Lab: NORMAL
MISCELLANEOUS LAB TEST RESULT: NORMAL
POTASSIUM SERPL-SCNC: 5.2 MMOL/L (ref 3.5–5.3)
SODIUM SERPL-SCNC: 145 MMOL/L (ref 136–145)

## 2021-03-29 PROCEDURE — 86705 HEP B CORE ANTIBODY IGM: CPT | Performed by: NURSE PRACTITIONER

## 2021-03-29 PROCEDURE — 86706 HEP B SURFACE ANTIBODY: CPT | Performed by: NURSE PRACTITIONER

## 2021-03-29 PROCEDURE — 87340 HEPATITIS B SURFACE AG IA: CPT | Performed by: NURSE PRACTITIONER

## 2021-03-29 PROCEDURE — 99233 SBSQ HOSP IP/OBS HIGH 50: CPT | Performed by: INTERNAL MEDICINE

## 2021-03-29 PROCEDURE — 99232 SBSQ HOSP IP/OBS MODERATE 35: CPT | Performed by: INTERNAL MEDICINE

## 2021-03-29 PROCEDURE — 86803 HEPATITIS C AB TEST: CPT | Performed by: NURSE PRACTITIONER

## 2021-03-29 PROCEDURE — 86704 HEP B CORE ANTIBODY TOTAL: CPT | Performed by: NURSE PRACTITIONER

## 2021-03-29 PROCEDURE — 82948 REAGENT STRIP/BLOOD GLUCOSE: CPT

## 2021-03-29 PROCEDURE — 80048 BASIC METABOLIC PNL TOTAL CA: CPT | Performed by: PHYSICIAN ASSISTANT

## 2021-03-29 RX ORDER — BUMETANIDE 0.25 MG/ML
0.5 INJECTION INTRAMUSCULAR; INTRAVENOUS CONTINUOUS
Status: DISCONTINUED | OUTPATIENT
Start: 2021-03-29 | End: 2021-03-30

## 2021-03-29 RX ORDER — ALBUMIN (HUMAN) 12.5 G/50ML
12.5 SOLUTION INTRAVENOUS 3 TIMES DAILY
Status: DISCONTINUED | OUTPATIENT
Start: 2021-03-29 | End: 2021-03-30

## 2021-03-29 RX ORDER — SODIUM BICARBONATE 650 MG/1
650 TABLET ORAL
Status: DISCONTINUED | OUTPATIENT
Start: 2021-03-29 | End: 2021-03-30

## 2021-03-29 RX ADMIN — AMLODIPINE BESYLATE 5 MG: 5 TABLET ORAL at 08:52

## 2021-03-29 RX ADMIN — Medication 0.5 MG/HR: at 11:00

## 2021-03-29 RX ADMIN — HEPARIN SODIUM 5000 UNITS: 5000 INJECTION INTRAVENOUS; SUBCUTANEOUS at 14:42

## 2021-03-29 RX ADMIN — HEPARIN SODIUM 5000 UNITS: 5000 INJECTION INTRAVENOUS; SUBCUTANEOUS at 21:31

## 2021-03-29 RX ADMIN — INSULIN LISPRO 3 UNITS: 100 INJECTION, SOLUTION INTRAVENOUS; SUBCUTANEOUS at 14:44

## 2021-03-29 RX ADMIN — INSULIN GLARGINE 25 UNITS: 100 INJECTION, SOLUTION SUBCUTANEOUS at 08:47

## 2021-03-29 RX ADMIN — ALBUMIN (HUMAN) 12.5 G: 0.25 INJECTION, SOLUTION INTRAVENOUS at 21:40

## 2021-03-29 RX ADMIN — SODIUM BICARBONATE 650 MG TABLET 650 MG: at 18:17

## 2021-03-29 RX ADMIN — ALBUMIN (HUMAN) 12.5 G: 0.25 INJECTION, SOLUTION INTRAVENOUS at 15:51

## 2021-03-29 RX ADMIN — CALCIUM ACETATE 667 MG: 667 CAPSULE ORAL at 16:14

## 2021-03-29 RX ADMIN — ATORVASTATIN CALCIUM 40 MG: 40 TABLET, FILM COATED ORAL at 08:51

## 2021-03-29 RX ADMIN — CARVEDILOL 25 MG: 12.5 TABLET, FILM COATED ORAL at 08:51

## 2021-03-29 RX ADMIN — ALBUMIN (HUMAN) 12.5 G: 0.25 INJECTION, SOLUTION INTRAVENOUS at 11:00

## 2021-03-29 RX ADMIN — BUMETANIDE 1 MG: 0.25 INJECTION, SOLUTION INTRAMUSCULAR; INTRAVENOUS at 08:38

## 2021-03-29 RX ADMIN — HEPARIN SODIUM 5000 UNITS: 5000 INJECTION INTRAVENOUS; SUBCUTANEOUS at 06:27

## 2021-03-29 RX ADMIN — CALCIUM ACETATE 667 MG: 667 CAPSULE ORAL at 08:51

## 2021-03-29 RX ADMIN — SODIUM BICARBONATE 650 MG TABLET 650 MG: at 14:46

## 2021-03-29 RX ADMIN — HYDRALAZINE HYDROCHLORIDE 75 MG: 25 TABLET, FILM COATED ORAL at 21:32

## 2021-03-29 RX ADMIN — SODIUM BICARBONATE 650 MG TABLET 1300 MG: at 08:51

## 2021-03-29 RX ADMIN — CARVEDILOL 25 MG: 12.5 TABLET, FILM COATED ORAL at 18:17

## 2021-03-29 RX ADMIN — INSULIN LISPRO 3 UNITS: 100 INJECTION, SOLUTION INTRAVENOUS; SUBCUTANEOUS at 08:44

## 2021-03-29 RX ADMIN — HYDRALAZINE HYDROCHLORIDE 75 MG: 25 TABLET, FILM COATED ORAL at 08:51

## 2021-03-29 RX ADMIN — CALCIUM ACETATE 667 MG: 667 CAPSULE ORAL at 11:08

## 2021-03-29 NOTE — ASSESSMENT & PLAN NOTE
Patient has normocytic anemia  Iron studies revealed anemia of chronic disease  Patient denies any signs of bleeding  Hemoglobin on 03/28 is 7 2  Patient declined blood transfusion earlier this this week when hemoglobin was 6 7  She has no abdominal tenderness and denies abdominal pain  Will continue monitor hemoglobin    Patient is on subcutaneous heparin for DVT prevention purposes

## 2021-03-29 NOTE — ASSESSMENT & PLAN NOTE
Wt Readings from Last 3 Encounters:   03/29/21 126 kg (277 lb 9 6 oz)   02/21/20 117 kg (258 lb)   01/07/20 117 kg (258 lb 12 8 oz)     Patient was admitted with increased lower extremity edema due to acute systolic CHF due to noncompliance with medications  Ejection fraction was 45-50% on this admission  Patient was treated with IV Bumex then IV diuretics were held because of worsening renal function then restarted on 3/26/2021 due to increasing lower extremity edema and oxygen requirement   Lungs are clear to auscultation but patient has worsening lower extremity edema  Continue on Bumex 1 mg b i d    Daily weight and I&Os  Diuretics per Nephrology

## 2021-03-29 NOTE — PROGRESS NOTES
NEPHROLOGY PROGRESS NOTE   Conner Mcmahan 60 y o  female MRN: [id number]  Unit/Bed#: -01 Encounter: [id number]  Reason for Consult: ADI ( POA) on CKD IV, vs progression of underlying disease  PLAN:   -unfortunately worsening creatinine, S/P renal biopsy, results pending    -admits to having slight metallic taste and uticaria, no urgent indication for RRT  -likely will need to initiate on dialysis this week, likely Tuesday or Wednesday    -weight is trending upward  UOP decreased  Increased hypoxia  -will place on Bumex drip and albumin TID  Monitor for response    -will place consult for IR for line placement tomorrow  Not agreeing for today    -Patient expresses that she would like her biopsy results back prior to initiating dialysis  We discussed that ultimately she will need to start dialysis despite her results  -patient expression she would like peritoneal dialysis as choice of modality in the future   -continues on oral bicarbonate, decreased to 650 mg TID  ASSESSMENT/PLAN:  Acute kidney injury (POA) on CKD stage 4, versus underlying progression of chronic disease:  Suspected prerenal secondary to volume depletion in the setting of diarrhea and hypotension as well as ATN with cellulitis  -presents with creatinine of 3 23   -creatinine continues to rise    -baseline creatinine previously low 2's in 2020    -diuretics resumed, currently on intermittent IV Bumex    -will place on Bumex drip   -previously received IV albumin for hypotension/3rd spacing  Will start IV albumin t i d  X6 doses  -repeat UA shows protein, 0-1 RBCs, 2-4 hyaline casts, 5-10 granular cast   -bladder scan unremarkable    -urine eosinophils 0   -UCPR: 4 57 g  -BHUMI, hepatitis panel, complements, rapid HIV, RPR, RF, ASO, anti-DNA double stranded, GBM, cryo, ANCA level normal     -antiPLA 2 R: pending  -UA:  3+ protein, no rbc's, fine granular cast   -imaging unremarkable for hydronephrosis    -SPEP negative/UPEP pending  -S/P renal biopsy 03/25   -avoid nephrotoxins, hypotension, IV contrast      Proteinuria:  -previous UPCR 3 g in 2018   -recent UPCR 4 57    -will continue to monitor  -SPEP negative/UPEP negative for monoclonal gammopathy   -not on Ace/Arb   -status post renal biopsy, results pending      Metabolic acidosis:  in the setting of acute renal failure as well as diarrhea  (improved)  -continues on oral bicarbonate tablets, decreased to 650 mg t i d        Hypertension: initially hypotensive,  blood pressure now stable between 140s-150s  -avoid hypotension or high fluctuations in blood pressure   -outpatient medications:  Coreg 25 mg 2 times per day, hydralazine 75 mg 2 times per day, amlodipine 2 5 mg daily, torsemide 40 mg daily   -continues on Coreg 25 mg b i d , hydralazine 75 mg 2 times per day, amlodipine 5 mg daily  -holding parameters adjusted on antihypertensive for systolic blood pressure less than 863       Diastolic heart failure:  Presenting with shortness of breath and lower extremity edema   Reports stop taking torsemide approximately 1 week ago   Most recent echo showed EF of 45-50% with grade 2 diastolic dysfunction, mild-to-moderate AS, dilated IVC  -chest x-ray negative for acute cardiopulmonary disease   -continue daily weights, I/O, fluid restriction  -outpatient diuretic:  Torsemide 40 mg daily  -currently on Bumex 1 mg IV BID  Will place on Bumex drip       Bilateral lower extremity edema:  Suspect 3rd spacing   -lower extremity Doppler negative for DVT  -continue to elevate legs throughout the day  -recommend low-salt diet      Anemia: Hgb low 7's  Previously refused PRBC transfusion    -continue to monitor and transfuse as needed for hemoglobin less than 7 0   -iron panel showed iron sat 14%  -received IV Venofer this admissions     -work up negative for monoclonal gammopathy    -checking occult stools       Lower extremity cellulitis: (resolved)  -completed antibiotics      MBD in CKD:  -hyperphosphatemia, continues on increased dose of phosphorus binder  Last phos level 4  8     -Will continue to monitor       Hypernatremia: (stable)  -continue to monitor, encourage oral intake      Other: Diabetes, obesity, CAD  Disposition:  Requiring additional stay due to medical needs  SUBJECTIVE:  The patient is resting in her bed  We discussed potential need to start dialysis tomorrow Wednesday  We discussed that her weight is increasing  She is currently on increased O2  She is short of breath with exertion  Her lower extremity edema is worsening  She is not producing much urine  We discussed the different dialysis modalities  The patient expresses she would like her biopsy results prior to line placement and initiation on dialysis  We discussed that despite her result, she will likely need dialysis by tomorrow or Wednesday      OBJECTIVE:  Current Weight: Weight - Scale: 126 kg (277 lb 9 6 oz)  Vitals:    03/28/21 1604 03/28/21 2100 03/29/21 0600 03/29/21 0700   BP: 138/69 142/63  143/61   BP Location: Right arm Right arm  Left arm   Pulse: 60 60  62   Resp: 16 18  12   Temp: 97 8 °F (36 6 °C) 97 9 °F (36 6 °C)  97 9 °F (36 6 °C)   TempSrc: Oral Oral  Oral   SpO2: 97% 99%  96%   Weight:   126 kg (277 lb 9 6 oz)    Height:           Intake/Output Summary (Last 24 hours) at 3/29/2021 8115  Last data filed at 3/29/2021 0801  Gross per 24 hour   Intake 580 ml   Output 450 ml   Net 130 ml     General: NAD  Skin: warm, dry, intact, no rash  HEENT: Moist mucous membranes, sclera anicteric, normocephalic, atraumatic  Neck: No apparent JVD appreciated  Chest: lung sounds clear B/L, on O2  CVS:Regular rate and rhythm, no murmer   Abdomen: Soft, round, non-tender, +BS, obese  Extremities: B/L LE edema present  Neuro: alert and oriented  Psych: appropriate mood and affect     Medications:    Current Facility-Administered Medications:     acetaminophen (TYLENOL) tablet 650 mg, 650 mg, Oral, Q6H PRN, Huong Araujo PA-C, 650 mg at 03/17/21 2320    amLODIPine (NORVASC) tablet 5 mg, 5 mg, Oral, Daily, HOWARD Martínez, 5 mg at 03/29/21 1327    atorvastatin (LIPITOR) tablet 40 mg, 40 mg, Oral, Daily, Sarah Sanchez PA-C, 40 mg at 03/29/21 0851    bumetanide (BUMEX) injection 1 mg, 1 mg, Intravenous, BID, Romy Carmona, DO, 1 mg at 03/29/21 8065    calcium acetate (PHOSLO) capsule 667 mg, 667 mg, Oral, TID With Meals, CHARLY AyalaNP, 667 mg at 03/29/21 0851    carvedilol (COREG) tablet 25 mg, 25 mg, Oral, BID, CHARLY MartínezNP, 25 mg at 03/29/21 0851    dextrose 50 % IV solution 25 mL, 25 mL, Intravenous, Once, Eleni Patterson DO    diphenhydrAMINE (BENADRYL) tablet 25 mg, 25 mg, Oral, Q6H PRN, Sarah Sanchez PA-C, 25 mg at 03/17/21 0545    heparin (porcine) subcutaneous injection 5,000 Units, 5,000 Units, Subcutaneous, Q8H [facility], Maureen Lemus MD, 5,000 Units at 03/29/21 0627    hydrALAZINE (APRESOLINE) tablet 75 mg, 75 mg, Oral, BID, CHARLY MartínezNP, 75 mg at 03/29/21 0851    insulin glargine (LANTUS) subcutaneous injection 25 Units 0 25 mL, 25 Units, Subcutaneous, QAM, Maureen Lemus MD, 25 Units at 03/29/21 0847    insulin lispro (HumaLOG) 100 units/mL subcutaneous injection 1-6 Units, 1-6 Units, Subcutaneous, TID AC, 1 Units at 03/28/21 1242 **AND** Fingerstick Glucose (POCT), , , TID AC, Sarah Sanchez PA-C    insulin lispro (HumaLOG) 100 units/mL subcutaneous injection 1-6 Units, 1-6 Units, Subcutaneous, HS, Sarah Sanchez PA-C, 3 Units at 03/27/21 2146    insulin lispro (HumaLOG) 100 units/mL subcutaneous injection 3 Units, 3 Units, Subcutaneous, TID With Meals, Maureen Lemus MD, 3 Units at 03/29/21 0844    ondansetron (ZOFRAN) injection 4 mg, 4 mg, Intravenous, Q6H PRN, Aguilar Hauser MD, 4 mg at 03/25/21 1221    oxyCODONE (ROXICODONE) IR tablet 5 mg, 5 mg, Oral, Q6H PRN, Aguilar Hauser MD    sodium bicarbonate tablet 1,300 mg, 1,300 mg, Oral, TID after meals, Kei Lawrence PA-C, 1,300 mg at 03/29/21 0851    Laboratory Results:  Results from last 7 days   Lab Units 03/29/21  0610 03/28/21  0556 03/27/21  0521 03/26/21  1915  03/26/21  0601 03/25/21  0411  03/23/21  0518   WBC Thousand/uL  --  10 57* 10 08  --   --  13 83* 10 26*   < > 9 84   HEMOGLOBIN g/dL  --  7 2* 7 2* 6 7*   < > 6 7* 7 1*   < > 7 3*   HEMATOCRIT %  --  24 8* 25 0* 23 2*   < > 23 0* 24 2*   < > 24 1*   PLATELETS Thousands/uL  --  185 186  --   --  201 215   < > 207   SODIUM mmol/L 145 145 142  --   --  143 146*   < > 145   POTASSIUM mmol/L 5 2 5 2 5 0  --   --  4 8 4 7   < > 4 3   CHLORIDE mmol/L 108 107 107  --   --  108 110*   < > 110*   CO2 mmol/L 27 26 22  --   --  23 25   < > 21   BUN mg/dL 100* 89* 82*  --   --  76* 77*   < > 73*   CREATININE mg/dL 5 95* 5 49* 4 93*  --   --  4 31* 4 10*   < > 4 24*   CALCIUM mg/dL 8 0* 7 9* 8 3  --   --  8 0* 8 0*   < > 7 9*   PHOSPHORUS mg/dL  --   --   --   --   --  4 8* 3 7  --  3 9   ALK PHOS U/L  --   --   --   --   --   --   --   --  94   ALT U/L  --   --   --   --   --   --   --   --  26   AST U/L  --   --   --   --   --   --   --   --  22    < > = values in this interval not displayed

## 2021-03-29 NOTE — ASSESSMENT & PLAN NOTE
Lab Results   Component Value Date    EGFR 7 03/29/2021    EGFR 8 03/28/2021    EGFR 9 03/27/2021    CREATININE 5 95 (H) 03/29/2021    CREATININE 5 49 (H) 03/28/2021    CREATININE 4 93 (H) 03/27/2021     Patient has stage IV chronic disease with baseline creatinine between 1 49-2  15  She presented with acute kidney injury  She has nephrotic range proteinuria    Nephrology was consulted  Patient underwent renal biopsy on March 25th  She denies any abdominal pain or signs of bleeding  Renal function is worse today with creatinine of 5 95  Awaiting kidney biopsy results  Nephrology will likely start hemodialysis this week

## 2021-03-29 NOTE — ASSESSMENT & PLAN NOTE
Lab Results   Component Value Date    HGBA1C 7 6 (H) 03/15/2021       Recent Labs     03/28/21  1100 03/28/21  1602 03/28/21 2058 03/29/21  0711   POCGLU 161* 94 121 91       Blood Sugar Average: Last 72 hrs:  · (P) 187 [id number]     Patient has type 2 diabetes on insulin with stage IV chronic disease  Continue Humalog 3 units before meals, and Lantus 25 units subcu daily

## 2021-03-29 NOTE — PROGRESS NOTES
Progress Note - Podiatry  Siri Caraballo 60 y o  female MRN: [id number]  Unit/Bed#: -01 Encounter: [id number]    Assessment/Plan:  1  Cellulitis bilateral legs              -secondary to #2&3 getting infected              -diminishing erythema and +3 edema bilateral persists  2  Skin ulceration left heel partial depth with DM2    - Continue with Maxorb Ag every other day  - Patient should follow-up at [address] [facility] [address]  upon discharge  - patient advised to avoid sleeping in recliner with legs dependent  3  Venous hypertension with ulceration and inflammation bilateral legs   -nursing advised to encourage elevation of extremities and to avoid dependence at all times  -multiple wounds bilateral legs continue to improve                 -continue with Xeroform, ABDs, Kerlix, and six-inch Ace wraps QOD   -dressing changes per nursing staff  4  Noncompliance with medication, anemia, ADI, essential hypertension, morbid obesity, DM2   -status post kidney biopsy, results pending              -managed per Internal Medicine and Nephrology      Subjective/Objective   Chief Complaint:   Chief Complaint   Patient presents with    Foot Pain     pt c/o left heel pain  pt has had pain for few days  pt also has ulcers on top of left foot  Subjective:  71-year-old white female resting comfortably in bedside recliner with legs dependent again  Denies any new pain or discomfort from legs  Relates legs overall are feeling much better but she continues to gain water weight with extensive lower extremity edema  Denies any fever or shortness of breath  Blood pressure 143/61, pulse 62, temperature 97 9 °F (36 6 °C), temperature source Oral, resp  rate 12, height 5' 4" (1 626 m), weight 126 kg (277 lb 9 6 oz), SpO2 91 %, not currently breastfeeding  ,Body mass index is 47 65 kg/m²      Invasive Devices     Peripheral Intravenous Line            Peripheral IV 03/27/21 Right Forearm 2 days                Physical Exam:   General appearance: alert, cooperative and no distress  Neuro/Vascular: Normal strength  Sensation and reflexes:  Diminished epicritic sensation  Pulses: Right: DP 0/4, PT 0/4, Left: DP 0/4, PT 0/4  Capillary Filling:  3 Sec, Edema:  + 2 - +3 edema bilateral  Extremity: Ulcers/Wounds:   · Left heel:  Partial depth ulcerative breakdown posterior lateral aspect, minimal serosanguineous drainage, 50% yellow, 50% pink/red, diminishing size, no evidence of acute infection  Less pain with palpation  Overall no significant change 2 days  · Bilateral legs:  Multiple partial depth venous hypertension ulcerations continue to diminish in size with much less  drainage  Erythema appears to have resolved  Overall legs are improving  Edema appears to have increased despite wounds positively healing in both legs  Labs and other studies:   CBC w/diff  Results from last 7 days   Lab Units 03/28/21  0556  03/26/21  0601   WBC Thousand/uL 10 57*   < > 13 83*   HEMOGLOBIN g/dL 7 2*   < > 6 7*   HEMATOCRIT % 24 8*   < > 23 0*   PLATELETS Thousands/uL 185   < > 201   NEUTROS PCT %  --   --  82*   LYMPHS PCT %  --   --  6*   MONOS PCT %  --   --  7   EOS PCT %  --   --  4    < > = values in this interval not displayed  BMP  Results from last 7 days   Lab Units 03/29/21  0610   POTASSIUM mmol/L 5 2   CHLORIDE mmol/L 108   CO2 mmol/L 27   BUN mg/dL 100*   CREATININE mg/dL 5 95*   CALCIUM mg/dL 8 0*     CMP  Results from last 7 days   Lab Units 03/29/21  0610  03/23/21  0518   POTASSIUM mmol/L 5 2   < > 4 3   CHLORIDE mmol/L 108   < > 110*   CO2 mmol/L 27   < > 21   BUN mg/dL 100*   < > 73*   CREATININE mg/dL 5 95*   < > 4 24*   CALCIUM mg/dL 8 0*   < > 7 9*   ALK PHOS U/L  --   --  94   ALT U/L  --   --  26   AST U/L  --   --  22    < > = values in this interval not displayed  @Culture@  Lab Results   Component Value Date    BLOODCX No Growth After 5 Days  03/15/2021    BLOODCX No Growth After 5 Days  03/15/2021    URINECX <10,000 cfu/ml  03/16/2021     No results found for: WOUNDCULT      Current Facility-Administered Medications:     acetaminophen (TYLENOL) tablet 650 mg, 650 mg, Oral, Q6H PRN, Sarah Sanchez PA-C, 650 mg at 03/17/21 2320    albumin human (FLEXBUMIN) 25 % injection 12 5 g, 12 5 g, Intravenous, TID, Vidhya Riddles, CRNP, Stopped at 03/29/21 1111    amLODIPine (NORVASC) tablet 5 mg, 5 mg, Oral, Daily, Vidhya Riddles, CRNP, 5 mg at 03/29/21 5292    atorvastatin (LIPITOR) tablet 40 mg, 40 mg, Oral, Daily, Sarah Sanchez PA-C, 40 mg at 03/29/21 0851    bumetanide (BUMEX) 12 5 mg infusion 50 mL, 0 5 mg/hr, Intravenous, Continuous, Vidhya Riddles, CRNP, Last Rate: 2 mL/hr at 03/29/21 1100, 0 5 mg/hr at 03/29/21 1100    calcium acetate (PHOSLO) capsule 667 mg, 667 mg, Oral, TID With Meals, Carmelo Holiday, CRNP, 667 mg at 03/29/21 1108    carvedilol (COREG) tablet 25 mg, 25 mg, Oral, BID, Vidhya Riddles, CRNP, 25 mg at 03/29/21 0851    dextrose 50 % IV solution 25 mL, 25 mL, Intravenous, Once, Areta Moh, DO    diphenhydrAMINE (BENADRYL) tablet 25 mg, 25 mg, Oral, Q6H PRN, Sarah Sanchez PA-C, 25 mg at 03/17/21 0545    heparin (porcine) subcutaneous injection 5,000 Units, 5,000 Units, Subcutaneous, Q8H [address], Nova Chatman MD, 5,000 Units at 03/29/21 7019    hydrALAZINE (APRESOLINE) tablet 75 mg, 75 mg, Oral, BID, Vidhya Riddles, CRNP, 75 mg at 03/29/21 0851    insulin glargine (LANTUS) subcutaneous injection 25 Units 0 25 mL, 25 Units, Subcutaneous, QAM, Nova Chatman MD, 25 Units at 03/29/21 0847    insulin lispro (HumaLOG) 100 units/mL subcutaneous injection 1-6 Units, 1-6 Units, Subcutaneous, TID AC, 1 Units at 03/28/21 1242 **AND** Fingerstick Glucose (POCT), , , TID AC, Sarah Sanchez PA-C    insulin lispro (HumaLOG) 100 units/mL subcutaneous injection 1-6 Units, 1-6 Units, Subcutaneous, HS, Sarah Sanchez PA-C, 3 Units at 03/27/21 2146    insulin lispro (HumaLOG) 100 units/mL subcutaneous injection 3 Units, 3 Units, Subcutaneous, TID With Meals, King Shira MD, 3 Units at 03/29/21 0844    ondansetron (ZOFRAN) injection 4 mg, 4 mg, Intravenous, Q6H PRN, Johnny Fountain MD, 4 mg at 03/25/21 1221    oxyCODONE (ROXICODONE) IR tablet 5 mg, 5 mg, Oral, Q6H PRN, Johnny Fountain MD    sodium bicarbonate tablet 650 mg, 650 mg, Oral, TID after meals, HOWARD Bush    Imaging: I have personally reviewed pertinent films in PACS  EKG, Pathology, and Other Studies: I have personally reviewed pertinent reports    VTE Pharmacologic Prophylaxis: Heparin  VTE Mechanical Prophylaxis: reason for no mechanical VTE prophylaxis Bilateral leg wounds    Rey Cooley DPM

## 2021-03-29 NOTE — PROGRESS NOTES
[place]  Progress Note - Autumn Gutierrez 1955, 60 y o  female MRN: [id number]  Unit/Bed#: -01 Encounter: [id number]  Primary Care Provider: Jeovanny Nichols DO   Date and time admitted to hospital: 3/15/2021  7:09 PM    Foot ulcer, left ([address] )  Assessment & Plan  · Patient with ulcer on left heel of the foot  · Patient is unaware when ulcer started but notes the rash on her lower extremities began about 6 weeks ago  · Patient reports increased pain with touch and walking over the past week  · Patient with poor compliance of diabetes insulin  · Patient completed the course of cefepime  · XR left foot completed on 03/15 with calcaneal spurring, no acute osseous abnormality  · Podiatry consult and recommendations appreciated  · Wound care and dressing changes per Podiatry recommendations    Acute kidney injury superimposed on chronic kidney disease [facility])  Assessment & Plan  Lab Results   Component Value Date    EGFR 7 03/29/2021    EGFR 8 03/28/2021    EGFR 9 03/27/2021    CREATININE 5 95 (H) 03/29/2021    CREATININE 5 49 (H) 03/28/2021    CREATININE 4 93 (H) 03/27/2021     Patient has stage IV chronic disease with baseline creatinine between 1 49-2  15  She presented with acute kidney injury  She has nephrotic range proteinuria    Nephrology was consulted  Patient underwent renal biopsy on March 25th  She denies any abdominal pain or signs of bleeding  Renal function is worse today with creatinine of 5 95  Awaiting kidney biopsy results  Nephrology will likely start hemodialysis this week    Non compliance w medication regimen  Assessment & Plan  · Patient reports she stopped taking her insulin around Thanksgiving 2020, her torsemide about 1 week ago and has not been seeing wound care for her legs,   · Patient had been seeing wound care for wounds of her bilateral lower extremities but stopped going due to the fear of jennifer Covid-19  · Consult case management    Cellulitis of both lower extremities  Assessment & Plan  Patient admitted with bilateral lower extremity cellulitis and was treated with IV cefepime and vancomycin  Cellulitis resolved  Anemia due to stage 4 chronic kidney disease [facility])  Assessment & Plan  Patient has normocytic anemia  Iron studies revealed anemia of chronic disease  Patient denies any signs of bleeding  Hemoglobin on 03/28 is 7 2  Patient declined blood transfusion earlier this this week when hemoglobin was 6 7  She has no abdominal tenderness and denies abdominal pain  Will continue monitor hemoglobin  Patient is on subcutaneous heparin for DVT prevention purposes    Type 2 diabetes mellitus with stage 4 chronic kidney disease, with long-term current use of insulin [facility])  Assessment & Plan  Lab Results   Component Value Date    HGBA1C 7 6 (H) 03/15/2021       Recent Labs     03/28/21  1100 03/28/21  1602 03/28/21 2058 03/29/21  0711   POCGLU 161* 94 121 91       Blood Sugar Average: Last 72 hrs:  · (P) 188 [id number]     Patient has type 2 diabetes on insulin with stage IV chronic disease  Continue Humalog 3 units before meals, and Lantus 25 units subcu daily   Coronary artery disease involving native coronary artery of native heart without angina pectoris  Assessment & Plan  Patient has coronary disease and she status post stent placements in 2018  She denies any chest pain or shortness of breath currently  Aspirin and Plavix are on hold  Continue atorvastatin    Essential hypertension  Assessment & Plan  · Continue home carvedilol 25 mg b i d , hydralazine 75 mg b i d   · Continue to monitor blood pressure per unit protocol    Class 3 severe obesity with body mass index (BMI) of 45 0 to 49 9 in adult [facility])  Assessment & Plan  Body mass index is 47 65 kg/m²    · Encourage lifestyle changes  · Consult nutrition    * Acute on chronic combined systolic and diastolic congestive heart failure (HCC)  Assessment & Plan  Wt Readings from Last 3 Encounters:   03/29/21 126 kg (277 lb 9 6 oz)   02/21/20 117 kg (258 lb)   01/07/20 117 kg (258 lb 12 8 oz)     Patient was admitted with increased lower extremity edema due to acute systolic CHF due to noncompliance with medications  Ejection fraction was 45-50% on this admission  Patient was treated with IV Bumex then IV diuretics were held because of worsening renal function then restarted on 3/26/2021 due to increasing lower extremity edema and oxygen requirement   Lungs are clear to auscultation but patient has worsening lower extremity edema  Continue on Bumex 1 mg b i d  Daily weight and I&Os  Diuretics per Nephrology            Labs & Imaging: I have personally reviewed pertinent reports  VTE Pharmacologic Prophylaxis: Heparin  VTE Mechanical Prophylaxis: sequential compression device    Code Status:   Level 1 - Full Code    Patient Centered Rounds: I have performed bedside rounds with nursing staff today  Discussions with Specialists or Other Care Team Provider:  Nephrology    Education and Discussions with Family / Patient:  Patient states she will update her daughter  I offered to call    Current Length of Stay: 14 day(s)    Current Patient Status: Inpatient   Certification Statement: The patient will continue to require additional inpatient hospital stay due to see my assessment and plan  Subjective:   Patient is seen and examined at bedside  Denies any new complaints  Has some shortness of breath  No chest pain, nausea, vomiting or abdominal pain  Afebrile  All other ROS are negative  Objective:    Vitals: Blood pressure 143/61, pulse 62, temperature 97 9 °F (36 6 °C), temperature source Oral, resp  rate 12, height 5' 4" (1 626 m), weight 126 kg (277 lb 9 6 oz), SpO2 96 %, not currently breastfeeding  ,Body mass index is 47 65 kg/m²    SPO2 RA Rest      ED to Hosp-Admission (Current) from 3/15/2021 in [address] Med [address]urg [address]   SpO2 96 %   SpO2 Activity  At Rest   O2 Device  Nasal cannula   O2 Flow Rate  --        I&O:     Intake/Output Summary (Last 24 hours) at 3/29/2021 0729  Last data filed at 3/29/2021 0600  Gross per 24 hour   Intake 720 ml   Output 450 ml   Net 270 ml       Physical Exam:    General- Alert, sitting comfortably in bed  Not in any acute distress  Neck- Supple, No JVD  CVS- regular, S1 and S2 normal  Chest- Bilateral Air entry, No rhochi, crackles or wheezing present  Abdomen- soft, nontender, not distended, no guarding or rigidity, BS+  Extremities-  has pedal edema, No calf tenderness  Bilateral lower extremity dressing in place  CNS-   Alert, awake and orientedx3  No focal deficits present  Invasive Devices     Peripheral Intravenous Line            Peripheral IV 03/27/21 Right Forearm 1 day                      Social History  reviewed  History reviewed  No pertinent family history   reviewed    Meds:  Current Facility-Administered Medications   Medication Dose Route Frequency Provider Last Rate Last Admin    acetaminophen (TYLENOL) tablet 650 mg  650 mg Oral Q6H PRN Anabelle Asher PA-C   650 mg at 03/17/21 2320    amLODIPine (NORVASC) tablet 5 mg  5 mg Oral Daily HOWARD Gutierrez   5 mg at 03/28/21 0845    atorvastatin (LIPITOR) tablet 40 mg  40 mg Oral Daily Sarah Sanchez PA-C   40 mg at 03/28/21 0845    bumetanide (BUMEX) injection 1 mg  1 mg Intravenous BID Romy Carmona, DO   1 mg at 03/28/21 1750    calcium acetate (PHOSLO) capsule 667 mg  667 mg Oral TID With Meals HOWARD Peñaloza   667 mg at 03/28/21 1749    carvedilol (COREG) tablet 25 mg  25 mg Oral BID HOWARD Gutierrez   25 mg at 03/28/21 1749    dextrose 50 % IV solution 25 mL  25 mL Intravenous Once Heather Gomez, DO        diphenhydrAMINE (BENADRYL) tablet 25 mg  25 mg Oral Q6H PRN Anabelle Asher PA-C   25 mg at 03/17/21 0545    heparin (porcine) subcutaneous injection 5,000 Units  5,000 Units Subcutaneous [facility] Ronaldo Sarmiento MD   5,000 Units at 03/29/21 3972    hydrALAZINE (APRESOLINE) tablet 75 mg  75 mg Oral BID HOWARD Cole   75 mg at 03/28/21 2202    insulin glargine (LANTUS) subcutaneous injection 25 Units 0 25 mL  25 Units Subcutaneous QAM Lenox Alpers, MD   25 Units at 03/28/21 0846    insulin lispro (HumaLOG) 100 units/mL subcutaneous injection 1-6 Units  1-6 Units Subcutaneous TID AC Zaida Lizarraga PA-C   1 Units at 03/28/21 1242    insulin lispro (HumaLOG) 100 units/mL subcutaneous injection 1-6 Units  1-6 Units Subcutaneous HS Zaida Lizarraga PA-C   3 Units at 03/27/21 2146    insulin lispro (HumaLOG) 100 units/mL subcutaneous injection 3 Units  3 Units Subcutaneous TID With Meals Lenox Alpers, MD   3 Units at 03/28/21 1751    ondansetron (ZOFRAN) injection 4 mg  4 mg Intravenous Q6H PRN Patricia Cruz MD   4 mg at 03/25/21 1221    oxyCODONE (ROXICODONE) IR tablet 5 mg  5 mg Oral Q6H PRN Patricia Cruz MD        sodium bicarbonate tablet 1,300 mg  1,300 mg Oral TID after meals Mansi Bowers PA-C   1,300 mg at 03/28/21 1750      Medications Prior to Admission   Medication    aspirin (ECOTRIN LOW STRENGTH) 81 mg EC tablet    atorvastatin (LIPITOR) 40 mg tablet    carvedilol (COREG) 25 mg tablet    clopidogrel (PLAVIX) 75 mg tablet    hydrALAZINE (APRESOLINE) 50 mg tablet    torsemide (DEMADEX) 20 mg tablet    amLODIPine (NORVASC) 2 5 mg tablet    insulin glargine (Toujeo SoloStar) 300 units/mL CONCETRATED U-300 injection pen (1-unit dial)    nitroglycerin (NITROSTAT) 0 4 mg SL tablet       Labs:  Results from last 7 days   Lab Units 03/28/21  0556 03/27/21  0521 03/26/21  1915  03/26/21  0601 03/25/21  0411   WBC Thousand/uL 10 57* 10 08  --   --  13 83* 10 26*   HEMOGLOBIN g/dL 7 2* 7 2* 6 7*   < > 6 7* 7 1*   HEMATOCRIT % 24 8* 25 0* 23 2*   < > 23 0* 24 2*   PLATELETS Thousands/uL 185 186  --   --  201 215   NEUTROS PCT %  --   --   --   --  82* 70   LYMPHS PCT %  --   --   --   --  6* 11*   MONOS PCT %  --   --   -- --  7 7   EOS PCT %  --   --   --   --  4 9*    < > = values in this interval not displayed  Results from last 7 days   Lab Units 03/29/21  0610 03/28/21  0556 03/27/21  0521  03/23/21  0518   POTASSIUM mmol/L 5 2 5 2 5 0   < > 4 3   CHLORIDE mmol/L 108 107 107   < > 110*   CO2 mmol/L 27 26 22   < > 21   BUN mg/dL 100* 89* 82*   < > 73*   CREATININE mg/dL 5 95* 5 49* 4 93*   < > 4 24*   CALCIUM mg/dL 8 0* 7 9* 8 3   < > 7 9*   ALK PHOS U/L  --   --   --   --  94   ALT U/L  --   --   --   --  26   AST U/L  --   --   --   --  22    < > = values in this interval not displayed  Lab Results   Component Value Date    TROPONINI <0 02 03/15/2021    TROPONINI 0 02 01/07/2020    TROPONINI <0 02 05/11/2017    CKTOTAL 53 04/07/2014     Results from last 7 days   Lab Units 03/25/21  0848   INR  1 22*     Lab Results   Component Value Date    BLOODCX No Growth After 5 Days  03/15/2021    BLOODCX No Growth After 5 Days  03/15/2021    URINECX <10,000 cfu/ml  03/16/2021         Imaging:  Results for orders placed during the hospital encounter of 03/15/21   XR chest 1 view portable    Narrative CHEST     INDICATION:   weakness  COMPARISON:  Chest radiograph from 1/7/2020 and 5/11/2017  EXAM PERFORMED/VIEWS:  XR CHEST PORTABLE  FINDINGS:    Heart size exaggerated by the portable projection and suboptimal inspiration  Lungs clear  No effusion or pneumothorax  Osseous structures normal for age  Impression No acute cardiopulmonary disease  Workstation performed: [id number]       Results for orders placed during the hospital encounter of 01/07/20   XR chest 2 views    Narrative CHEST     INDICATION:   Chest Pain  COMPARISON:  5/11/2017    EXAM PERFORMED/VIEWS:  XR CHEST PA & LATERAL      FINDINGS:    Cardiomediastinal silhouette appears unremarkable  Crowding of the pulmonary markings secondary to shallow inspiration  Grossly clear lungs  No pneumothorax or pleural effusion      Osseous structures appear within normal limits for patient age  Impression No acute cardiopulmonary disease  Workstation performed: [id number]         Last 24 Hours Medication List:   Current Facility-Administered Medications   Medication Dose Route Frequency Provider Last Rate    acetaminophen  650 mg Oral Q6H PRN Twin Aguilar PA-C      amLODIPine  5 mg Oral Daily HOWARD Walker      atorvastatin  40 mg Oral Daily Twin Aguilar PA-C      bumetanide  1 mg Intravenous BID Romy K Mathew, DO      calcium acetate  667 mg Oral TID With Meals Carmelo Holiday, CRLOS      carvedilol  25 mg Oral BID HOWARD Walker      dextrose  25 mL Intravenous Once Areta Janice, DO      diphenhydrAMINE  25 mg Oral Q6H PRN Twin Aguilar PA-C      heparin (porcine)  5,000 Units Subcutaneous [facility] Nova Chatman MD      hydrALAZINE  75 mg Oral BID HOWARD Walker      insulin glargine  25 Units Subcutaneous QAM Nova Chatman MD      insulin lispro  1-6 Units Subcutaneous TID AC Sarah Sanchez PA-C      insulin lispro  1-6 Units Subcutaneous HS Sarah Sanchez PA-C      insulin lispro  3 Units Subcutaneous TID With Meals Nova Chatman MD      ondansetron  4 mg Intravenous Q6H PRN Jean Marie Don MD      oxyCODONE  5 mg Oral Q6H PRN Jean Marie Don, MD      sodium bicarbonate  1,300 mg Oral TID after meals Harish Thurston PA-C          Today, Patient Was Seen By: Jean Marie Don MD    ** Please Note: Dictation voice to text software may have been used in the creation of this document   **

## 2021-03-29 NOTE — PLAN OF CARE
Problem: Potential for Falls  Goal: Patient will remain free of falls  Description: INTERVENTIONS:  - Assess patient frequently for physical needs  -  Identify cognitive and physical deficits and behaviors that affect risk of falls    -  [place] fall precautions as indicated by assessment   - Educate patient/family on patient safety including physical limitations  - Instruct patient to call for assistance with activity based on assessment  - Modify environment to reduce risk of injury  - Consider OT/PT consult to assist with strengthening/mobility  Outcome: Progressing     Problem: PAIN - ADULT  Goal: Verbalizes/displays adequate comfort level or baseline comfort level  Description: Interventions:  - Encourage patient to monitor pain and request assistance  - Assess pain using appropriate pain scale  - Administer analgesics based on type and severity of pain and evaluate response  - Implement non-pharmacological measures as appropriate and evaluate response  - Consider cultural and social influences on pain and pain management  - Notify physician/advanced practitioner if interventions unsuccessful or patient reports new pain  Outcome: Progressing     Problem: INFECTION - ADULT  Goal: Absence or prevention of progression during hospitalization  Description: INTERVENTIONS:  - Assess and monitor for signs and symptoms of infection  - Monitor lab/diagnostic results  - Monitor all insertion sites, i e  indwelling lines, tubes, and drains  - Monitor endotracheal if appropriate and nasal secretions for changes in amount and color  - [place] appropriate cooling/warming therapies per order  - Administer medications as ordered  - Instruct and encourage patient and family to use good hand hygiene technique  - Identify and instruct in appropriate isolation precautions for identified infection/condition  Outcome: Progressing  Goal: Absence of fever/infection during neutropenic period  Description: INTERVENTIONS:  - Monitor WBC    Outcome: Progressing     Problem: SAFETY ADULT  Goal: Patient will remain free of falls  Description: INTERVENTIONS:  - Assess patient frequently for physical needs  -  Identify cognitive and physical deficits and behaviors that affect risk of falls    -  [place] fall precautions as indicated by assessment   - Educate patient/family on patient safety including physical limitations  - Instruct patient to call for assistance with activity based on assessment  - Modify environment to reduce risk of injury  - Consider OT/PT consult to assist with strengthening/mobility  Outcome: Progressing  Goal: Maintain or return to baseline ADL function  Description: INTERVENTIONS:  -  Assess patient's ability to carry out ADLs; assess patient's baseline for ADL function and identify physical deficits which impact ability to perform ADLs (bathing, care of mouth/teeth, toileting, grooming, dressing, etc )  - Assess/evaluate cause of self-care deficits   - Assess range of motion  - Assess patient's mobility; develop plan if impaired  - Assess patient's need for assistive devices and provide as appropriate  - Encourage maximum independence but intervene and supervise when necessary  - Involve family in performance of ADLs  - Assess for home care needs following discharge   - Consider OT consult to assist with ADL evaluation and planning for discharge  - Provide patient education as appropriate  Outcome: Progressing  Goal: Maintain or return mobility status to optimal level  Description: INTERVENTIONS:  - Assess patient's baseline mobility status (ambulation, transfers, stairs, etc )    - Identify cognitive and physical deficits and behaviors that affect mobility  - Identify mobility aids required to assist with transfers and/or ambulation (gait belt, sit-to-stand, lift, walker, cane, etc )  - [place] fall precautions as indicated by assessment  - Record patient progress and toleration of activity level on Mobility SBAR; progress patient to next Phase/Stage  - Instruct patient to call for assistance with activity based on assessment  - Consider rehabilitation consult to assist with strengthening/weightbearing, etc   Outcome: Progressing     Problem: DISCHARGE PLANNING  Goal: Discharge to home or other facility with appropriate resources  Description: INTERVENTIONS:  - Identify barriers to discharge w/patient and caregiver  - Arrange for needed discharge resources and transportation as appropriate  - Identify discharge learning needs (meds, wound care, etc )  - Arrange for interpretive services to assist at discharge as needed  - Refer to Case Management Department for coordinating discharge planning if the patient needs post-hospital services based on physician/advanced practitioner order or complex needs related to functional status, cognitive ability, or social support system  Outcome: Progressing     Problem: Knowledge Deficit  Goal: Patient/family/caregiver demonstrates understanding of disease process, treatment plan, medications, and discharge instructions  Description: Complete learning assessment and assess knowledge base    Interventions:  - Provide teaching at level of understanding  - Provide teaching via preferred learning methods  Outcome: Progressing     Problem: CARDIOVASCULAR - ADULT  Goal: Maintains optimal cardiac output and hemodynamic stability  Description: INTERVENTIONS:  - Monitor I/O, vital signs and rhythm  - Monitor for S/S and trends of decreased cardiac output  - Administer and titrate ordered vasoactive medications to optimize hemodynamic stability  - Assess quality of pulses, skin color and temperature  - Assess for signs of decreased coronary artery perfusion  - Instruct patient to report change in severity of symptoms  Outcome: Progressing  Goal: Absence of cardiac dysrhythmias or at baseline rhythm  Description: INTERVENTIONS:  - Continuous cardiac monitoring, vital signs, obtain 12 lead EKG if ordered  - Administer antiarrhythmic and heart rate control medications as ordered  - Monitor electrolytes and administer replacement therapy as ordered  Outcome: Progressing     Problem: SKIN/TISSUE INTEGRITY - ADULT  Goal: Skin integrity remains intact  Description: INTERVENTIONS  - Identify patients at risk for skin breakdown  - Assess and monitor skin integrity  - Assess and monitor nutrition and hydration status  - Monitor labs (i e  albumin)  - Assess for incontinence   - Turn and reposition patient  - Assist with mobility/ambulation  - Relieve pressure over bony prominences  - Avoid friction and shearing  - Provide appropriate hygiene as needed including keeping skin clean and dry  - Evaluate need for skin moisturizer/barrier cream  - Collaborate with interdisciplinary team (i e  Nutrition, Rehabilitation, etc )   - Patient/family teaching  Outcome: Progressing  Goal: Incision(s), wounds(s) or drain site(s) healing without S/S of infection  Description: INTERVENTIONS  - Assess and document risk factors for skin impairment   - Assess and document dressing, incision, wound bed, drain sites and surrounding tissue  - Consider nutrition services referral as needed  - Oral mucous membranes remain intact  - Provide patient/ family education  Outcome: Progressing  Goal: Oral mucous membranes remain intact  Description: INTERVENTIONS  - Assess oral mucosa and hygiene practices  - Implement preventative oral hygiene regimen  - Implement oral medicated treatments as ordered  - Initiate Nutrition services referral as needed  Outcome: Progressing     Problem: Prexisting or High Potential for Compromised Skin Integrity  Goal: Skin integrity is maintained or improved  Description: INTERVENTIONS:  - Identify patients at risk for skin breakdown  - Assess and monitor skin integrity  - Assess and monitor nutrition and hydration status  - Monitor labs   - Assess for incontinence   - Turn and reposition patient  - Assist with mobility/ambulation  - Relieve pressure over bony prominences  - Avoid friction and shearing  - Provide appropriate hygiene as needed including keeping skin clean and dry  - Evaluate need for skin moisturizer/barrier cream  - Collaborate with interdisciplinary team   - Patient/family teaching  - Consider wound care consult   Outcome: Progressing     Problem: Nutrition/Hydration-ADULT  Goal: Nutrient/Hydration intake appropriate for improving, restoring or maintaining nutritional needs  Description: Monitor and assess patient's nutrition/hydration status for malnutrition  Collaborate with interdisciplinary team and initiate plan and interventions as ordered  Monitor patient's weight and dietary intake as ordered or per policy  Utilize nutrition screening tool and intervene as necessary  Determine patient's food preferences and provide high-protein, high-caloric foods as appropriate       INTERVENTIONS:  - Monitor oral intake, urinary output, labs, and treatment plans  - Assess nutrition and hydration status and recommend course of action  - Evaluate amount of meals eaten  - Assist patient with eating if necessary   - Allow adequate time for meals  - Recommend/ encourage appropriate diets, oral nutritional supplements, and vitamin/mineral supplements  - Order, calculate, and assess calorie counts as needed  - Recommend, monitor, and adjust tube feedings and TPN/PPN based on assessed needs  - Assess need for intravenous fluids  - Provide specific nutrition/hydration education as appropriate  - Include patient/family/caregiver in decisions related to nutrition  Outcome: Progressing     Problem: RESPIRATORY - ADULT  Goal: Achieves optimal ventilation and oxygenation  Description: INTERVENTIONS:  - Assess for changes in respiratory status  - Assess for changes in mentation and behavior  - Position to facilitate oxygenation and minimize respiratory effort  - Oxygen administered by appropriate delivery if ordered  - Initiate smoking cessation education as indicated  - Encourage broncho-pulmonary hygiene including cough, deep breathe, Incentive Spirometry  - Assess the need for suctioning and aspirate as needed  - Assess and instruct to report SOB or any respiratory difficulty  - Respiratory Therapy support as indicated  Outcome: Progressing     Problem: METABOLIC, FLUID AND ELECTROLYTES - ADULT  Goal: Electrolytes maintained within normal limits  Description: INTERVENTIONS:  - Monitor labs and assess patient for signs and symptoms of electrolyte imbalances  - Administer electrolyte replacement as ordered  - Monitor response to electrolyte replacements, including repeat lab results as appropriate  - Instruct patient on fluid and nutrition as appropriate  Outcome: Progressing  Goal: Fluid balance maintained  Description: INTERVENTIONS:  - Monitor labs   - Monitor I/O and WT  - Instruct patient on fluid and nutrition as appropriate  - Assess for signs & symptoms of volume excess or deficit  Outcome: Progressing  Goal: Glucose maintained within target range  Description: INTERVENTIONS:  - Monitor Blood Glucose as ordered  - Assess for signs and symptoms of hyperglycemia and hypoglycemia  - Administer ordered medications to maintain glucose within target range  - Assess nutritional intake and initiate nutrition service referral as needed  Outcome: Progressing     Problem: HEMATOLOGIC - ADULT  Goal: Maintains hematologic stability  Description: INTERVENTIONS  - Assess for signs and symptoms of bleeding or hemorrhage  - Monitor labs  - Administer supportive blood products/factors as ordered and appropriate  Outcome: Progressing     Problem: MUSCULOSKELETAL - ADULT  Goal: Maintain or return mobility to safest level of function  Description: INTERVENTIONS:  - Assess patient's ability to carry out ADLs; assess patient's baseline for ADL function and identify physical deficits which impact ability to perform ADLs (bathing, care of mouth/teeth, toileting, grooming, dressing, etc )  - Assess/evaluate cause of self-care deficits   - Assess range of motion  - Assess patient's mobility  - Assess patient's need for assistive devices and provide as appropriate  - Encourage maximum independence but intervene and supervise when necessary  - Involve family in performance of ADLs  - Assess for home care needs following discharge   - Consider OT consult to assist with ADL evaluation and planning for discharge  - Provide patient education as appropriate  Outcome: Progressing

## 2021-03-29 NOTE — TELEMEDICINE
INTERPROFESSIONAL (PHONE) CONSULTATION - Interventional Radiology  Dorothy Muller 60 y o  female MRN: [id number]  Unit/Bed#: -01 Encounter: [id number]    IR has been consulted to evaluate the patient, determine the appropriate procedure, and whether or not a procedure can and should be performed regarding the care of Dorothy Muller  IP Consult to IR  Consult performed by: Mally Rubi MD  Consult ordered by: Noe Bartlett [address] [place] [address]         03/29/21      Assessment/Recommendation:   71-year-old woman with progressive kidney disease with need to start hemodialysis this week per nephrology  Consult for tunneled hemodialysis catheter placement  Plan for procedure tomorrow  NPO after midnight  Total time spent in review of data, discussion with requesting provider and rendering advice was 10 minutes  Thank you for allowing Interventional Radiology to participate in the care of Dorothy Muller  Please don't hesitate to call or TigerText us with any questions       Cathie Garcia MD

## 2021-03-29 NOTE — ASSESSMENT & PLAN NOTE
Patient has coronary disease and she status post stent placements in 2018  She denies any chest pain or shortness of breath currently    Aspirin and Plavix are on hold  Continue atorvastatin

## 2021-03-29 NOTE — TREATMENT PLAN
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

## 2021-03-30 ENCOUNTER — APPOINTMENT (INPATIENT)
Dept: DIALYSIS | Facility: HOSPITAL | Age: 66
DRG: 698 | End: 2021-03-30
Attending: INTERNAL MEDICINE
Payer: COMMERCIAL

## 2021-03-30 ENCOUNTER — APPOINTMENT (INPATIENT)
Dept: INTERVENTIONAL RADIOLOGY/VASCULAR | Facility: HOSPITAL | Age: 66
DRG: 698 | End: 2021-03-30
Attending: RADIOLOGY
Payer: COMMERCIAL

## 2021-03-30 LAB
ANION GAP SERPL CALCULATED.3IONS-SCNC: 12 MMOL/L (ref 4–13)
BASOPHILS # BLD AUTO: 0.03 THOUSANDS/ΜL (ref 0–0.1)
BASOPHILS NFR BLD AUTO: 0 % (ref 0–1)
BUN SERPL-MCNC: 107 MG/DL (ref 5–25)
CALCIUM SERPL-MCNC: 7.8 MG/DL (ref 8.3–10.1)
CHLORIDE SERPL-SCNC: 107 MMOL/L (ref 100–108)
CO2 SERPL-SCNC: 26 MMOL/L (ref 21–32)
CREAT SERPL-MCNC: 6.16 MG/DL (ref 0.6–1.3)
EOSINOPHIL # BLD AUTO: 0.8 THOUSAND/ΜL (ref 0–0.61)
EOSINOPHIL NFR BLD AUTO: 11 % (ref 0–6)
ERYTHROCYTE [DISTWIDTH] IN BLOOD BY AUTOMATED COUNT: 17.3 % (ref 11.6–15.1)
GFR SERPL CREATININE-BSD FRML MDRD: 7 ML/MIN/1.73SQ M
GLUCOSE SERPL-MCNC: 210 MG/DL (ref 65–140)
GLUCOSE SERPL-MCNC: 84 MG/DL (ref 65–140)
GLUCOSE SERPL-MCNC: 86 MG/DL (ref 65–140)
GLUCOSE SERPL-MCNC: 88 MG/DL (ref 65–140)
GLUCOSE SERPL-MCNC: 91 MG/DL (ref 65–140)
GLUCOSE SERPL-MCNC: 96 MG/DL (ref 65–140)
HCT VFR BLD AUTO: 21.9 % (ref 34.8–46.1)
HCT VFR BLD AUTO: 22.2 % (ref 34.8–46.1)
HGB BLD-MCNC: 6.3 G/DL (ref 11.5–15.4)
HGB BLD-MCNC: 6.6 G/DL (ref 11.5–15.4)
IMM GRANULOCYTES # BLD AUTO: 0.04 THOUSAND/UL (ref 0–0.2)
IMM GRANULOCYTES NFR BLD AUTO: 1 % (ref 0–2)
LYMPHOCYTES # BLD AUTO: 0.71 THOUSANDS/ΜL (ref 0.6–4.47)
LYMPHOCYTES NFR BLD AUTO: 10 % (ref 14–44)
MCH RBC QN AUTO: 28.1 PG (ref 26.8–34.3)
MCHC RBC AUTO-ENTMCNC: 28.8 G/DL (ref 31.4–37.4)
MCV RBC AUTO: 98 FL (ref 82–98)
MONOCYTES # BLD AUTO: 0.43 THOUSAND/ΜL (ref 0.17–1.22)
MONOCYTES NFR BLD AUTO: 6 % (ref 4–12)
NEUTROPHILS # BLD AUTO: 5.33 THOUSANDS/ΜL (ref 1.85–7.62)
NEUTS SEG NFR BLD AUTO: 72 % (ref 43–75)
NRBC BLD AUTO-RTO: 0 /100 WBCS
PLATELET # BLD AUTO: 170 THOUSANDS/UL (ref 149–390)
PMV BLD AUTO: 10.8 FL (ref 8.9–12.7)
POTASSIUM SERPL-SCNC: 5.2 MMOL/L (ref 3.5–5.3)
RBC # BLD AUTO: 2.24 MILLION/UL (ref 3.81–5.12)
SODIUM SERPL-SCNC: 145 MMOL/L (ref 136–145)
WBC # BLD AUTO: 7.34 THOUSAND/UL (ref 4.31–10.16)

## 2021-03-30 PROCEDURE — C1894 INTRO/SHEATH, NON-LASER: HCPCS

## 2021-03-30 PROCEDURE — 85025 COMPLETE CBC W/AUTO DIFF WBC: CPT | Performed by: INTERNAL MEDICINE

## 2021-03-30 PROCEDURE — 87340 HEPATITIS B SURFACE AG IA: CPT | Performed by: INTERNAL MEDICINE

## 2021-03-30 PROCEDURE — 90935 HEMODIALYSIS ONE EVALUATION: CPT | Performed by: INTERNAL MEDICINE

## 2021-03-30 PROCEDURE — 86705 HEP B CORE ANTIBODY IGM: CPT | Performed by: INTERNAL MEDICINE

## 2021-03-30 PROCEDURE — 99152 MOD SED SAME PHYS/QHP 5/>YRS: CPT

## 2021-03-30 PROCEDURE — 76937 US GUIDE VASCULAR ACCESS: CPT

## 2021-03-30 PROCEDURE — 36581 REPLACE TUNNELED CV CATH: CPT

## 2021-03-30 PROCEDURE — 86803 HEPATITIS C AB TEST: CPT | Performed by: INTERNAL MEDICINE

## 2021-03-30 PROCEDURE — 82948 REAGENT STRIP/BLOOD GLUCOSE: CPT

## 2021-03-30 PROCEDURE — 85018 HEMOGLOBIN: CPT | Performed by: INTERNAL MEDICINE

## 2021-03-30 PROCEDURE — 5A1D70Z PERFORMANCE OF URINARY FILTRATION, INTERMITTENT, LESS THAN 6 HOURS PER DAY: ICD-10-PCS | Performed by: INTERNAL MEDICINE

## 2021-03-30 PROCEDURE — 80048 BASIC METABOLIC PNL TOTAL CA: CPT | Performed by: INTERNAL MEDICINE

## 2021-03-30 PROCEDURE — 85014 HEMATOCRIT: CPT | Performed by: INTERNAL MEDICINE

## 2021-03-30 PROCEDURE — 99153 MOD SED SAME PHYS/QHP EA: CPT

## 2021-03-30 PROCEDURE — 02H633Z INSERTION OF INFUSION DEVICE INTO RIGHT ATRIUM, PERCUTANEOUS APPROACH: ICD-10-PCS | Performed by: INTERNAL MEDICINE

## 2021-03-30 PROCEDURE — 86706 HEP B SURFACE ANTIBODY: CPT | Performed by: INTERNAL MEDICINE

## 2021-03-30 PROCEDURE — 86704 HEP B CORE ANTIBODY TOTAL: CPT | Performed by: INTERNAL MEDICINE

## 2021-03-30 PROCEDURE — 99232 SBSQ HOSP IP/OBS MODERATE 35: CPT | Performed by: INTERNAL MEDICINE

## 2021-03-30 PROCEDURE — 0JH63XZ INSERTION OF TUNNELED VASCULAR ACCESS DEVICE INTO CHEST SUBCUTANEOUS TISSUE AND FASCIA, PERCUTANEOUS APPROACH: ICD-10-PCS | Performed by: RADIOLOGY

## 2021-03-30 PROCEDURE — C1769 GUIDE WIRE: HCPCS

## 2021-03-30 RX ORDER — CEFAZOLIN SODIUM 1 G/50ML
SOLUTION INTRAVENOUS
Status: COMPLETED | OUTPATIENT
Start: 2021-03-30 | End: 2021-03-30

## 2021-03-30 RX ORDER — HYDRALAZINE HYDROCHLORIDE 25 MG/1
50 TABLET, FILM COATED ORAL 2 TIMES DAILY
Status: DISCONTINUED | OUTPATIENT
Start: 2021-03-30 | End: 2021-03-31

## 2021-03-30 RX ORDER — MIDAZOLAM HYDROCHLORIDE 2 MG/2ML
INJECTION, SOLUTION INTRAMUSCULAR; INTRAVENOUS CODE/TRAUMA/SEDATION MEDICATION
Status: COMPLETED | OUTPATIENT
Start: 2021-03-30 | End: 2021-03-30

## 2021-03-30 RX ORDER — FENTANYL CITRATE 50 UG/ML
INJECTION, SOLUTION INTRAMUSCULAR; INTRAVENOUS CODE/TRAUMA/SEDATION MEDICATION
Status: COMPLETED | OUTPATIENT
Start: 2021-03-30 | End: 2021-03-30

## 2021-03-30 RX ADMIN — ALBUMIN (HUMAN) 12.5 G: 0.25 INJECTION, SOLUTION INTRAVENOUS at 10:35

## 2021-03-30 RX ADMIN — INSULIN LISPRO 3 UNITS: 100 INJECTION, SOLUTION INTRAVENOUS; SUBCUTANEOUS at 10:36

## 2021-03-30 RX ADMIN — SODIUM BICARBONATE 650 MG TABLET 650 MG: at 10:36

## 2021-03-30 RX ADMIN — FENTANYL CITRATE 25 MCG: 50 INJECTION, SOLUTION INTRAMUSCULAR; INTRAVENOUS at 08:55

## 2021-03-30 RX ADMIN — AMLODIPINE BESYLATE 5 MG: 5 TABLET ORAL at 10:37

## 2021-03-30 RX ADMIN — CALCIUM ACETATE 667 MG: 667 CAPSULE ORAL at 11:51

## 2021-03-30 RX ADMIN — OXYCODONE HYDROCHLORIDE 5 MG: 5 TABLET ORAL at 08:13

## 2021-03-30 RX ADMIN — Medication 0.5 MG/HR: at 05:49

## 2021-03-30 RX ADMIN — EPOETIN ALFA 10000 UNITS: 10000 SOLUTION INTRAVENOUS; SUBCUTANEOUS at 14:23

## 2021-03-30 RX ADMIN — INSULIN LISPRO 2 UNITS: 100 INJECTION, SOLUTION INTRAVENOUS; SUBCUTANEOUS at 22:09

## 2021-03-30 RX ADMIN — HEPARIN SODIUM 5000 UNITS: 5000 INJECTION INTRAVENOUS; SUBCUTANEOUS at 22:09

## 2021-03-30 RX ADMIN — CEFAZOLIN SODIUM 3000 MG: 1 SOLUTION INTRAVENOUS at 08:58

## 2021-03-30 RX ADMIN — HYDRALAZINE HYDROCHLORIDE 75 MG: 25 TABLET, FILM COATED ORAL at 10:37

## 2021-03-30 RX ADMIN — INSULIN GLARGINE 25 UNITS: 100 INJECTION, SOLUTION SUBCUTANEOUS at 10:36

## 2021-03-30 RX ADMIN — CALCIUM ACETATE 667 MG: 667 CAPSULE ORAL at 10:36

## 2021-03-30 RX ADMIN — MIDAZOLAM 0.5 MG: 1 INJECTION INTRAMUSCULAR; INTRAVENOUS at 09:21

## 2021-03-30 RX ADMIN — ATORVASTATIN CALCIUM 40 MG: 40 TABLET, FILM COATED ORAL at 10:37

## 2021-03-30 RX ADMIN — MIDAZOLAM 0.5 MG: 1 INJECTION INTRAMUSCULAR; INTRAVENOUS at 08:55

## 2021-03-30 RX ADMIN — HYDRALAZINE HYDROCHLORIDE 50 MG: 25 TABLET, FILM COATED ORAL at 22:09

## 2021-03-30 RX ADMIN — OXYCODONE HYDROCHLORIDE 5 MG: 5 TABLET ORAL at 17:22

## 2021-03-30 RX ADMIN — MIDAZOLAM 0.5 MG: 1 INJECTION INTRAMUSCULAR; INTRAVENOUS at 09:14

## 2021-03-30 RX ADMIN — CARVEDILOL 25 MG: 12.5 TABLET, FILM COATED ORAL at 17:21

## 2021-03-30 RX ADMIN — HEPARIN SODIUM 5000 UNITS: 5000 INJECTION INTRAVENOUS; SUBCUTANEOUS at 13:32

## 2021-03-30 RX ADMIN — SODIUM BICARBONATE 650 MG TABLET 650 MG: at 11:51

## 2021-03-30 RX ADMIN — CARVEDILOL 25 MG: 12.5 TABLET, FILM COATED ORAL at 10:37

## 2021-03-30 NOTE — CASE MANAGEMENT
LOS: 15  Continuing to follow patient  Patient with IJV hemodialysis cath this am and will need outpatient chair  Met with patient and discussed her need for outpatient HD and she is aware and agreeable  She would like HD at [facility] HD at [facility]  She plans to continue to work M-F from 8:30-4:45 and requests an AM chair feeling she would not lose so much work time  Encouraged patient not to think about going directly back to work, her body may need time to adjust to HM  She informed CM her daughter would transport her to HD  Started intake form for Ana DUNCAN  CM to follow

## 2021-03-30 NOTE — PROCEDURES
NEPHROLOGY DIALYSIS PROCEDURE NOTE      Patient seen and examined on Hemodialysis, tolerating procedure well  All documentation, labs, medications were reviewed by myself, and the treatment plan was reviewed with nurse and patient  I was present for the initiation of the treatment, and before and I explained the procedure to her  Seen on Dialysis at : 1:59 PM  Dialysis Access: Right IJ tunneled PermCath  Vitals: 152/60  Dialysis time: 2 hours  Dialyzer: F160  Sodium bath: 140  Potassium bath: 2 K+  Bicarbonate bath: 30  Calcium bath: 2 5  Ultrafiltration: 0 5-1L  Blood flow rate: 250 cc/min  Dialysis flow rate: 1 5 X Qb  Dialysis temperature: 35 5C  Medications given on HD: Epogen 10K    Assessment/Plan:    Acute kidney injury, likely secondary to acute tubular necrosis in the setting of volume depletion along with hypotension and cellulitis  Admission creatinine 3 23 mg/dL  Renal function continued to worsen with symptoms of uremia and volume overload  Started on HD on 3/30/21 for 1st treatment  Plan for 2nd treatment tomorrow   for outpatient HD placement      Dialysis content was obtained last week by my colleague  Biopsy proven severe nodular diabetic glomerular sclerosis with severe tubular atrophy and interstitial fibrosis with severe arterial sclerosis and atherosclerosis with hyalinosis      Chronic kidney disease stage IV, with underlying progression of disease  Baseline creatinine in the low 2s back in 2020  She will now be deemed End Stage Renal Disease     Anemia chronic disease, SPEP/UPEP were negative for monoclonal gammopathy  Refused blood transfusion, Hb 6 3  Plan to start TOREY 10K today, and will give Iron Sucrose with tomorrow treatment      Volume overload, d/c Bumex drip  UF on HD  Hypertension, to allow more UF, d/c amlodipine, and reduce Hydralazine    Hyperkalemia, now on HD    Review of Systems: The entire 12 point ROS has been reviewed      Physical Exam:    General: awake, obese, No acute distress  Skin: excoriations, itchiness  CVS: S1S2+ RRR  Lungs: rales at bases  Abdomen: non-tender, obese  Access: Right IJ permcath  Extremities: ++ edema  Neuro: AAOx3          Current Facility-Administered Medications:     acetaminophen (TYLENOL) tablet 650 mg, 650 mg, Oral, Q6H PRN, Juana Juares PA-C, 650 mg at 03/17/21 2320    atorvastatin (LIPITOR) tablet 40 mg, 40 mg, Oral, Daily, Sarah Sanchez PA-C, 40 mg at 03/30/21 1037    calcium acetate (PHOSLO) capsule 667 mg, 667 mg, Oral, TID With Meals, HOWARD Arevalo, 667 mg at 03/30/21 1151    carvedilol (COREG) tablet 25 mg, 25 mg, Oral, BID, HOWARD Hopper, 25 mg at 03/30/21 1037    dextrose 50 % IV solution 25 mL, 25 mL, Intravenous, Once, Fannie Joya DO    diphenhydrAMINE (BENADRYL) tablet 25 mg, 25 mg, Oral, Q6H PRN, Sarah Sanchez PA-C, 25 mg at 03/17/21 0545    epoetin cedric (EPOGEN,PROCRIT) injection 10,000 Units, 10,000 Units, Intravenous, Once, Benjamin Truong MD    heparin (porcine) subcutaneous injection 5,000 Units, 5,000 Units, Subcutaneous, Q8H [address], Christine Mayo MD, 5,000 Units at 03/30/21 1332    hydrALAZINE (APRESOLINE) tablet 50 mg, 50 mg, Oral, BID, Benjamin Truong MD    insulin glargine (LANTUS) subcutaneous injection 25 Units 0 25 mL, 25 Units, Subcutaneous, QAM, Christine Mayo MD, 25 Units at 03/30/21 1036    insulin lispro (HumaLOG) 100 units/mL subcutaneous injection 1-6 Units, 1-6 Units, Subcutaneous, TID AC, 1 Units at 03/28/21 1242 **AND** Fingerstick Glucose (POCT), , , TID AC, Sarah Sanchez PA-C    insulin lispro (HumaLOG) 100 units/mL subcutaneous injection 1-6 Units, 1-6 Units, Subcutaneous, HS, Sarah Sanchez PA-C, 3 Units at 03/27/21 2146    insulin lispro (HumaLOG) 100 units/mL subcutaneous injection 3 Units, 3 Units, Subcutaneous, TID With Meals, Christine Mayo MD, 3 Units at 03/30/21 1036    ondansetron (ZOFRAN) injection 4 mg, 4 mg, Intravenous, Q6H PRN, Casey Bowie MD, 4 mg at 03/25/21 1222    oxyCODONE (ROXICODONE) IR tablet 5 mg, 5 mg, Oral, Q6H PRN, Jean Marie Don MD, 5 mg at 03/30/21 7796

## 2021-03-30 NOTE — PROGRESS NOTES
[place]  Progress Note - Annabella Naranjo 1955, 60 y o  female MRN: [id number]  Unit/Bed#: -01 Encounter: [id number]  Primary Care Provider: Yessenia Crews DO   Date and time admitted to hospital: 3/15/2021  7:09 PM    Foot ulcer, left ([address] )  Assessment & Plan  · Patient with ulcer on left heel of the foot  · Patient is unaware when ulcer started but notes the rash on her lower extremities began about 6 weeks ago  · Patient reports increased pain with touch and walking over the past week  · Patient with poor compliance of diabetes insulin  · Patient completed the course of cefepime  · XR left foot completed on 03/15 with calcaneal spurring, no acute osseous abnormality  · Podiatry consult and recommendations appreciated  · Wound care and dressing changes per Podiatry recommendations    Acute kidney injury superimposed on chronic kidney disease [facility])  Assessment & Plan  Lab Results   Component Value Date    EGFR 7 03/30/2021    EGFR 7 03/29/2021    EGFR 8 03/28/2021    CREATININE 6 16 (H) 03/30/2021    CREATININE 5 95 (H) 03/29/2021    CREATININE 5 49 (H) 03/28/2021     Patient has stage IV chronic disease with baseline creatinine between 1 49-2  15  She presented with acute kidney injury  She has nephrotic range proteinuria  Nephrology was consulted  Patient underwent renal biopsy on March 25th  She denies any abdominal pain or signs of bleeding  Renal function is worse today with creatinine of 6 16  Continue on Bumex drip as per Nephrology recs  In the biopsy results showed severe nodular diabetic glomerular sclerosis with severe tubular atrophy and interstitial fibrosis with severe arterial sclerosis and atherosclerosis with hyalinosis    Nephrology ordered IR consult for dialysis catheter placement  Nephrology is planning to initiate dialysis    Non compliance w medication regimen  Assessment & Plan  · Patient reports she stopped taking her insulin around Thanksgiving 2020, her torsemide about 1 week ago and has not been seeing wound care for her legs,   · Patient had been seeing wound care for wounds of her bilateral lower extremities but stopped going due to the fear of jennifer Covid-19  · Consult case management    Cellulitis of both lower extremities  Assessment & Plan  Patient admitted with bilateral lower extremity cellulitis and was treated with IV cefepime and vancomycin  Cellulitis resolved  Anemia due to stage 4 chronic kidney disease [facility])  Assessment & Plan  Patient has normocytic anemia  Iron studies revealed anemia of chronic disease  Patient denies any signs of bleeding  Hemoglobin is 6 3  Patient declined blood transfusion earlier this this week when hemoglobin was 6 7  Discussed with patient again and she declined blood transfusion and wants to monitor the H&H  She has no abdominal tenderness and denies abdominal pain  Will continue monitor hemoglobin  Patient is on subcutaneous heparin for DVT prevention purposes    Type 2 diabetes mellitus with stage 4 chronic kidney disease, with long-term current use of insulin [facility])  Assessment & Plan  Lab Results   Component Value Date    HGBA1C 7 6 (H) 03/15/2021       Recent Labs     03/29/21  1117 03/29/21  1604 03/29/21  2118 03/30/21  0636   POCGLU 95 72 142* 84       Blood Sugar Average: Last 72 hrs:  · (P) 135 [id number]     Patient has type 2 diabetes on insulin with stage IV chronic disease  Continue Humalog 3 units before meals, and Lantus 25 units subcu daily   Coronary artery disease involving native coronary artery of native heart without angina pectoris  Assessment & Plan  Patient has coronary disease and she status post stent placements in 2018  She denies any chest pain or shortness of breath currently    Aspirin and Plavix are on hold  Continue atorvastatin    Essential hypertension  Assessment & Plan  · Continue home carvedilol 25 mg b i d , hydralazine 75 mg b i d  · Continue to monitor blood pressure per unit protocol    Class 3 severe obesity with body mass index (BMI) of 45 0 to 49 9 in adult [facility])  Assessment & Plan  Body mass index is 48 82 kg/m²  · Encourage lifestyle changes  · Consult nutrition    * Acute on chronic combined systolic and diastolic congestive heart failure (HCC)  Assessment & Plan  Wt Readings from Last 3 Encounters:   03/30/21 129 kg (284 lb 6 3 oz)   02/21/20 117 kg (258 lb)   01/07/20 117 kg (258 lb 12 8 oz)     Patient was admitted with increased lower extremity edema due to acute systolic CHF due to noncompliance with medications  Ejection fraction was 45-50% on this admission  Patient was treated with IV Bumex then IV diuretics were held because of worsening renal function then restarted on 3/26/2021 due to increasing lower extremity edema and oxygen requirement   Lungs are clear to auscultation but patient has worsening lower extremity edema  Patient was switched to Bumex drip on 03/29  Continue as per Nephrology recommendations  Daily weight and I&Os  Nephrology is planning to initiate dialysis  IR consult for dialysis catheter placement          Labs & Imaging: I have personally reviewed pertinent reports  VTE Pharmacologic Prophylaxis: Heparin  VTE Mechanical Prophylaxis: sequential compression device    Code Status:   Level 1 - Full Code    Patient Centered Rounds: I have performed bedside rounds with nursing staff today  Discussions with Specialists or Other Care Team Provider:  Nephrology    Education and Discussions with Family / Patient:  Patient states she will update her daughter  Current Length of Stay: 15 day(s)    Current Patient Status: Inpatient   Certification Statement: The patient will continue to require additional inpatient hospital stay due to see my assessment and plan  Subjective:   Patient is seen and examined at bedside  Denies any new complaints    Afebrile  All other ROS are negative  Objective:    Vitals: Blood pressure 154/68, pulse 61, temperature 98 °F (36 7 °C), temperature source Oral, resp  rate 18, height 5' 4" (1 626 m), weight 129 kg (284 lb 6 3 oz), SpO2 98 %, not currently breastfeeding  ,Body mass index is 48 82 kg/m²  SPO2 RA Rest      ED to Hosp-Admission (Current) from 3/15/2021 in [address] Med Surg Unit   SpO2  98 %   SpO2 Activity  At Rest   O2 Device  Nasal cannula   O2 Flow Rate  --        I&O:     Intake/Output Summary (Last 24 hours) at 3/30/2021 0715  Last data filed at 3/29/2021 1801  Gross per 24 hour   Intake 730 ml   Output 225 ml   Net 505 ml       Physical Exam:    General- Alert, lying comfortably in bed  Not in any acute distress  Neck- Supple, No JVD  CVS- regular, S1 and S2 normal,  Chest- Bilateral Air entry, rhonchi, crackles or wheezing present  Abdomen- soft, nontender, not distended, no guarding or rigidity, BS+  Extremities-  No pedal edema, No calf tenderness  Bilateral lower extremity dressings in place  CNS-   Alert, awake and orientedx3  No focal deficits present  Invasive Devices     Peripheral Intravenous Line            Peripheral IV 03/27/21 Right Forearm 2 days                      Social History  reviewed  History reviewed  No pertinent family history   reviewed    Meds:  Current Facility-Administered Medications   Medication Dose Route Frequency Provider Last Rate Last Admin    acetaminophen (TYLENOL) tablet 650 mg  650 mg Oral Q6H PRN Gerry Allen PA-C   650 mg at 03/17/21 2320    albumin human (FLEXBUMIN) 25 % injection 12 5 g  12 5 g Intravenous TID HOWARD Malagon 0 mL/hr at 03/29/21 1616 12 5 g at 03/29/21 2140    amLODIPine (NORVASC) tablet 5 mg  5 mg Oral Daily HOWARD Malagon   5 mg at 03/29/21 3701    atorvastatin (LIPITOR) tablet 40 mg  40 mg Oral Daily Sarah Sanchez PA-C   40 mg at 03/29/21 0851    bumetanide (BUMEX) 12 5 mg infusion 50 mL  0 5 mg/hr Intravenous Continuous Yoko Qureshi CRNP 2 mL/hr at 03/30/21 0549 0 5 mg/hr at 03/30/21 0549    calcium acetate (PHOSLO) capsule 667 mg  667 mg Oral TID With Meals Contrerasjenifer Nageotte, CRNP   667 mg at 03/29/21 1614    carvedilol (COREG) tablet 25 mg  25 mg Oral BID Luiskira Cordero CRNP   25 mg at 03/29/21 1817    dextrose 50 % IV solution 25 mL  25 mL Intravenous Once Francois Kawasaki, DO        diphenhydrAMINE (BENADRYL) tablet 25 mg  25 mg Oral Q6H PRN Gilson Andrade PA-C   25 mg at 03/17/21 0545    heparin (porcine) subcutaneous injection 5,000 Units  5,000 Units Subcutaneous [facility] Lavonne Smith MD   5,000 Units at 03/29/21 2131    hydrALAZINE (APRESOLINE) tablet 75 mg  75 mg Oral BID HOWARD Dickey   75 mg at 03/29/21 2132    insulin glargine (LANTUS) subcutaneous injection 25 Units 0 25 mL  25 Units Subcutaneous QAM Lavonne Smith MD   25 Units at 03/29/21 0847    insulin lispro (HumaLOG) 100 units/mL subcutaneous injection 1-6 Units  1-6 Units Subcutaneous TID AC Gilson Andrade PA-C   1 Units at 03/28/21 1242    insulin lispro (HumaLOG) 100 units/mL subcutaneous injection 1-6 Units  1-6 Units Subcutaneous HS Gilson Andrade PA-C   3 Units at 03/27/21 2146    insulin lispro (HumaLOG) 100 units/mL subcutaneous injection 3 Units  3 Units Subcutaneous TID With Meals Lavonne Smith MD   3 Units at 03/29/21 1444    ondansetron (ZOFRAN) injection 4 mg  4 mg Intravenous Q6H PRN Bishop Catrachito MD   4 mg at 03/25/21 1221    oxyCODONE (ROXICODONE) IR tablet 5 mg  5 mg Oral Q6H PRN Bishop Catrachito MD        sodium bicarbonate tablet 650 mg  650 mg Oral TID after meals HOWARD Dickey   650 mg at 03/29/21 1817      Medications Prior to Admission   Medication    aspirin (ECOTRIN LOW STRENGTH) 81 mg EC tablet    atorvastatin (LIPITOR) 40 mg tablet    carvedilol (COREG) 25 mg tablet    clopidogrel (PLAVIX) 75 mg tablet    hydrALAZINE (APRESOLINE) 50 mg tablet    torsemide (DEMADEX) 20 mg tablet    amLODIPine (NORVASC) 2 5 mg tablet    insulin glargine (Toujeo SoloStar) 300 units/mL CONCETRATED U-300 injection pen (1-unit dial)    nitroglycerin (NITROSTAT) 0 4 mg SL tablet       Labs:  Results from last 7 days   Lab Units 03/28/21  0556 03/27/21  0521 03/26/21  1915  03/26/21  0601 03/25/21  0411   WBC Thousand/uL 10 57* 10 08  --   --  13 83* 10 26*   HEMOGLOBIN g/dL 7 2* 7 2* 6 7*   < > 6 7* 7 1*   HEMATOCRIT % 24 8* 25 0* 23 2*   < > 23 0* 24 2*   PLATELETS Thousands/uL 185 186  --   --  201 215   NEUTROS PCT %  --   --   --   --  82* 70   LYMPHS PCT %  --   --   --   --  6* 11*   MONOS PCT %  --   --   --   --  7 7   EOS PCT %  --   --   --   --  4 9*    < > = values in this interval not displayed  Results from last 7 days   Lab Units 03/30/21  0628 03/29/21  0610 03/28/21  0556   POTASSIUM mmol/L 5 2 5 2 5 2   CHLORIDE mmol/L 107 108 107   CO2 mmol/L 26 27 26   BUN mg/dL 107* 100* 89*   CREATININE mg/dL 6 16* 5 95* 5 49*   CALCIUM mg/dL 7 8* 8 0* 7 9*     Lab Results   Component Value Date    TROPONINI <0 02 03/15/2021    TROPONINI 0 02 01/07/2020    TROPONINI <0 02 05/11/2017    CKTOTAL 53 04/07/2014     Results from last 7 days   Lab Units 03/25/21  0848   INR  1 22*     Lab Results   Component Value Date    BLOODCX No Growth After 5 Days  03/15/2021    BLOODCX No Growth After 5 Days  03/15/2021    URINECX <10,000 cfu/ml  03/16/2021         Imaging:  Results for orders placed during the hospital encounter of 03/15/21   XR chest 1 view portable    Narrative CHEST     INDICATION:   weakness  COMPARISON:  Chest radiograph from 1/7/2020 and 5/11/2017  EXAM PERFORMED/VIEWS:  XR CHEST PORTABLE  FINDINGS:    Heart size exaggerated by the portable projection and suboptimal inspiration  Lungs clear  No effusion or pneumothorax  Osseous structures normal for age  Impression No acute cardiopulmonary disease             Workstation performed: [id number]       Results for orders placed during the hospital encounter of 01/07/20   XR chest 2 views    Narrative CHEST     INDICATION:   Chest Pain  COMPARISON:  5/11/2017    EXAM PERFORMED/VIEWS:  XR CHEST PA & LATERAL      FINDINGS:    Cardiomediastinal silhouette appears unremarkable  Crowding of the pulmonary markings secondary to shallow inspiration  Grossly clear lungs  No pneumothorax or pleural effusion  Osseous structures appear within normal limits for patient age  Impression No acute cardiopulmonary disease          Workstation performed: [id number]         Last 24 Hours Medication List:   Current Facility-Administered Medications   Medication Dose Route Frequency Provider Last Rate    acetaminophen  650 mg Oral Q6H PRN Reggie Delacruz PA-C      albumin human  12 5 g Intravenous TID Jose Sandip CRNP 0 g (03/29/21 1616)    amLODIPine  5 mg Oral Daily Jose HOWARD Oropeza      atorvastatin  40 mg Oral Daily Reggie Delacruz PA-C      bumetanide  0 5 mg/hr Intravenous Continuous Jose JuneauHOWARD 0 5 mg/hr (03/30/21 0549)    calcium acetate  667 mg Oral TID With Meals HOWARD Stovall      carvedilol  25 mg Oral BID Jose HOWARD Oropeza      dextrose  25 mL Intravenous Once Melani Meade DO      diphenhydrAMINE  25 mg Oral Q6H PRN Reggie Delacruz PA-C      heparin (porcine)  5,000 Units Subcutaneous [facility] Chaim Arias MD      hydrALAZINE  75 mg Oral BID Jose HOWARD Oropeza      insulin glargine  25 Units Subcutaneous QAM Chaim Arias MD      insulin lispro  1-6 Units Subcutaneous TID AC Sarah Sanchez PA-C      insulin lispro  1-6 Units Subcutaneous HS Sarah Sanchez PA-C      insulin lispro  3 Units Subcutaneous TID With Meals Chaim Arias MD      ondansetron  4 mg Intravenous Q6H PRN Rhonda Redmond MD      oxyCODONE  5 mg Oral Q6H PRN Rhonda Redmond MD      sodium bicarbonate  650 mg Oral TID after meals HOWARD Vyas          Today, Patient Was Seen By: Rhonda Redmond MD    ** Please Note: Dictation voice to text software may have been used in the creation of this document   **

## 2021-03-30 NOTE — BRIEF OP NOTE (RAD/CATH)
INTERVENTIONAL RADIOLOGY PROCEDURE NOTE    Date: 3/30/2021    Procedure: Procedure name not found  Preoperative diagnosis:   1  Pain of left heel    2  Cellulitis of left foot    3  CHF (congestive heart failure) ([place] [address] )    4  Hyperglycemia    5  CKD (chronic kidney disease)    6  Acute on chronic congestive heart failure, unspecified heart failure type ([place] [address] )    7  Stage 4 chronic kidney disease ([place] [address] )    8  Type 2 diabetes mellitus with hyperglycemia, with long-term current use of insulin ([facility])    9  Localized edema         Postoperative diagnosis: Same  Surgeon: Leticia Dai MD     Assistant: None  No qualified resident was available  Blood loss: None    Specimens: None     Findings: Successful placement of 15 5F x 28cm (23cm tip to cuff) tunneled right IJV hemodialysis catheter  OK for immediate use  Complications: None immediate      Anesthesia: conscious sedation and local

## 2021-03-30 NOTE — INTERVAL H&P NOTE
Update: (This section must be completed if the H&P was completed greater than 24 hrs to procedure or admission)    H&P reviewed  After examining the patient, I find no changed to the H&P since it had been written  Patient re-evaluated   Accept as history and physical     [place] MD Beto/March 30, 2021/9:43 AM

## 2021-03-30 NOTE — PLAN OF CARE
Problem: Potential for Falls  Goal: Patient will remain free of falls  Description: INTERVENTIONS:  - Assess patient frequently for physical needs  -  Identify cognitive and physical deficits and behaviors that affect risk of falls    -  [place] fall precautions as indicated by assessment   - Educate patient/family on patient safety including physical limitations  - Instruct patient to call for assistance with activity based on assessment  - Modify environment to reduce risk of injury  - Consider OT/PT consult to assist with strengthening/mobility  Outcome: Progressing

## 2021-03-30 NOTE — ASSESSMENT & PLAN NOTE
Lab Results   Component Value Date    EGFR 7 03/30/2021    EGFR 7 03/29/2021    EGFR 8 03/28/2021    CREATININE 6 16 (H) 03/30/2021    CREATININE 5 95 (H) 03/29/2021    CREATININE 5 49 (H) 03/28/2021     Patient has stage IV chronic disease with baseline creatinine between 1 49-2  15  She presented with acute kidney injury  She has nephrotic range proteinuria  Nephrology was consulted  Patient underwent renal biopsy on March 25th  She denies any abdominal pain or signs of bleeding  Renal function is worse today with creatinine of 6 16  Continue on Bumex drip as per Nephrology recs  In the biopsy results showed severe nodular diabetic glomerular sclerosis with severe tubular atrophy and interstitial fibrosis with severe arterial sclerosis and atherosclerosis with hyalinosis    Nephrology ordered IR consult for dialysis catheter placement  Nephrology is planning to initiate dialysis

## 2021-03-30 NOTE — ASSESSMENT & PLAN NOTE
Lab Results   Component Value Date    HGBA1C 7 6 (H) 03/15/2021       Recent Labs     03/29/21  1117 03/29/21  1604 03/29/21  2118 03/30/21  0636   POCGLU 95 72 142* 84       Blood Sugar Average: Last 72 hrs:  · (P) 135 [id number]     Patient has type 2 diabetes on insulin with stage IV chronic disease  Continue Humalog 3 units before meals, and Lantus 25 units subcu daily

## 2021-03-30 NOTE — OCCUPATIONAL THERAPY NOTE
Occupational Therapy Tx Note     Patient Name: Daniel Gardner  NTBVQ'I Date: 3/30/2021  Problem List  Principal Problem:    Acute on chronic combined systolic and diastolic congestive heart failure (HCC)  Active Problems:    Class 3 severe obesity with body mass index (BMI) of 45 0 to 49 9 in [facility])    Essential hypertension    Coronary artery disease involving native coronary artery of native heart without angina pectoris    Type 2 diabetes mellitus with stage 4 chronic kidney disease, with long-term current use of insulin ([facility])    Anemia due to stage 4 chronic kidney disease ([facility])    Cellulitis of both lower extremities    Non compliance w medication regimen    Acute kidney injury superimposed on chronic kidney disease ([facility] [address] [address] )    Foot ulcer, left ([facility] )            03/30/21 0940   Note Type   Cancel Reasons Medical status   Assessment   Assessment Attempted to see pt for OT treatment session  Pt currently with low H&H  Will follow as appropriate               AUGUSTO Rivera/ROGER

## 2021-03-30 NOTE — HEMODIALYSIS
Post-Dialysis RN Treatment Note    Blood Pressure:  Pre 149/59 mm/Hg  Post 143/58 mmHg   EDW  tbd kg    Weight:  Pre 129 kg   Post 128 7 kg   Mode of weight measurement: Bed Scale   Volume Removed  500 ml    Treatment duration 120 minutes    NS given  No    Treatment shortened? No   Medications given during Rx epogen [id number]   Estimated Kt/V  None Reported   Access type: Permacath/TDC   Access Status:  site bleeding   Report called to primary nurse   Yes Veena Glasgow RN    Bleeding under cath dressing noted post hd  New dressing applied, primary rn aware and reinforced with abd pad

## 2021-03-30 NOTE — ASSESSMENT & PLAN NOTE
Wt Readings from Last 3 Encounters:   03/30/21 129 kg (284 lb 6 3 oz)   02/21/20 117 kg (258 lb)   01/07/20 117 kg (258 lb 12 8 oz)     Patient was admitted with increased lower extremity edema due to acute systolic CHF due to noncompliance with medications  Ejection fraction was 45-50% on this admission  Patient was treated with IV Bumex then IV diuretics were held because of worsening renal function then restarted on 3/26/2021 due to increasing lower extremity edema and oxygen requirement   Lungs are clear to auscultation but patient has worsening lower extremity edema  Patient was switched to Bumex drip on 03/29  Continue as per Nephrology recommendations  Daily weight and I&Os  Nephrology is planning to initiate dialysis    IR consult for dialysis catheter placement

## 2021-03-30 NOTE — ASSESSMENT & PLAN NOTE
Patient has normocytic anemia  Iron studies revealed anemia of chronic disease  Patient denies any signs of bleeding  Hemoglobin is 6 3  Patient declined blood transfusion earlier this this week when hemoglobin was 6 7  Discussed with patient again and she declined blood transfusion and wants to monitor the H&H  She has no abdominal tenderness and denies abdominal pain  Will continue monitor hemoglobin    Patient is on subcutaneous heparin for DVT prevention purposes

## 2021-03-30 NOTE — PLAN OF CARE
2 hour initial hemodialysis session planned for today with ultrafiltration goal of 0 5 to 1 liter to optimize fluid and electrolyte balance      Problem: METABOLIC, FLUID AND ELECTROLYTES - ADULT  Goal: Electrolytes maintained within normal limits  Description: INTERVENTIONS:  - Monitor labs and assess patient for signs and symptoms of electrolyte imbalances  - Administer electrolyte replacement as ordered  - Monitor response to electrolyte replacements, including repeat lab results as appropriate  - Instruct patient on fluid and nutrition as appropriate  Outcome: Progressing  Goal: Fluid balance maintained  Description: INTERVENTIONS:  - Monitor labs   - Monitor I/O and WT  - Instruct patient on fluid and nutrition as appropriate  - Assess for signs & symptoms of volume excess or deficit  Outcome: Progressing

## 2021-03-31 ENCOUNTER — APPOINTMENT (INPATIENT)
Dept: DIALYSIS | Facility: HOSPITAL | Age: 66
DRG: 698 | End: 2021-03-31
Payer: COMMERCIAL

## 2021-03-31 LAB
ANION GAP SERPL CALCULATED.3IONS-SCNC: 10 MMOL/L (ref 4–13)
BUN SERPL-MCNC: 94 MG/DL (ref 5–25)
CALCIUM SERPL-MCNC: 8 MG/DL (ref 8.3–10.1)
CHLORIDE SERPL-SCNC: 107 MMOL/L (ref 100–108)
CO2 SERPL-SCNC: 28 MMOL/L (ref 21–32)
CREAT SERPL-MCNC: 5.75 MG/DL (ref 0.6–1.3)
GFR SERPL CREATININE-BSD FRML MDRD: 7 ML/MIN/1.73SQ M
GLUCOSE SERPL-MCNC: 134 MG/DL (ref 65–140)
GLUCOSE SERPL-MCNC: 159 MG/DL (ref 65–140)
GLUCOSE SERPL-MCNC: 84 MG/DL (ref 65–140)
GLUCOSE SERPL-MCNC: 98 MG/DL (ref 65–140)
GLUCOSE SERPL-MCNC: 99 MG/DL (ref 65–140)
HBV CORE AB SER QL: NORMAL
HBV CORE IGM SER QL: NORMAL
HBV SURFACE AB SER-ACNC: <3.1 MIU/ML
HBV SURFACE AG SER QL: NORMAL
HCV AB SER QL: NORMAL
MAGNESIUM SERPL-MCNC: 2.5 MG/DL (ref 1.6–2.6)
PHOSPHATE SERPL-MCNC: 4.8 MG/DL (ref 2.3–4.1)
POTASSIUM SERPL-SCNC: 4.8 MMOL/L (ref 3.5–5.3)
SODIUM SERPL-SCNC: 145 MMOL/L (ref 136–145)

## 2021-03-31 PROCEDURE — 99232 SBSQ HOSP IP/OBS MODERATE 35: CPT | Performed by: INTERNAL MEDICINE

## 2021-03-31 PROCEDURE — 5A1D70Z PERFORMANCE OF URINARY FILTRATION, INTERMITTENT, LESS THAN 6 HOURS PER DAY: ICD-10-PCS | Performed by: INTERNAL MEDICINE

## 2021-03-31 PROCEDURE — 82948 REAGENT STRIP/BLOOD GLUCOSE: CPT

## 2021-03-31 PROCEDURE — 80048 BASIC METABOLIC PNL TOTAL CA: CPT | Performed by: INTERNAL MEDICINE

## 2021-03-31 PROCEDURE — 84100 ASSAY OF PHOSPHORUS: CPT | Performed by: INTERNAL MEDICINE

## 2021-03-31 PROCEDURE — 83735 ASSAY OF MAGNESIUM: CPT | Performed by: INTERNAL MEDICINE

## 2021-03-31 RX ADMIN — INSULIN LISPRO 3 UNITS: 100 INJECTION, SOLUTION INTRAVENOUS; SUBCUTANEOUS at 18:12

## 2021-03-31 RX ADMIN — INSULIN LISPRO 1 UNITS: 100 INJECTION, SOLUTION INTRAVENOUS; SUBCUTANEOUS at 08:32

## 2021-03-31 RX ADMIN — CALCIUM ACETATE 667 MG: 667 CAPSULE ORAL at 14:20

## 2021-03-31 RX ADMIN — INSULIN LISPRO 3 UNITS: 100 INJECTION, SOLUTION INTRAVENOUS; SUBCUTANEOUS at 14:20

## 2021-03-31 RX ADMIN — HEPARIN SODIUM 5000 UNITS: 5000 INJECTION INTRAVENOUS; SUBCUTANEOUS at 14:20

## 2021-03-31 RX ADMIN — IRON SUCROSE 100 MG: 20 INJECTION, SOLUTION INTRAVENOUS at 06:32

## 2021-03-31 RX ADMIN — ATORVASTATIN CALCIUM 40 MG: 40 TABLET, FILM COATED ORAL at 08:30

## 2021-03-31 RX ADMIN — CARVEDILOL 25 MG: 12.5 TABLET, FILM COATED ORAL at 08:30

## 2021-03-31 RX ADMIN — CARVEDILOL 25 MG: 12.5 TABLET, FILM COATED ORAL at 18:12

## 2021-03-31 RX ADMIN — HYDRALAZINE HYDROCHLORIDE 50 MG: 25 TABLET, FILM COATED ORAL at 08:30

## 2021-03-31 RX ADMIN — HEPARIN SODIUM 5000 UNITS: 5000 INJECTION INTRAVENOUS; SUBCUTANEOUS at 22:00

## 2021-03-31 RX ADMIN — INSULIN LISPRO 3 UNITS: 100 INJECTION, SOLUTION INTRAVENOUS; SUBCUTANEOUS at 08:32

## 2021-03-31 RX ADMIN — CALCIUM ACETATE 667 MG: 667 CAPSULE ORAL at 06:32

## 2021-03-31 RX ADMIN — CALCIUM ACETATE 667 MG: 667 CAPSULE ORAL at 18:13

## 2021-03-31 RX ADMIN — INSULIN GLARGINE 25 UNITS: 100 INJECTION, SOLUTION SUBCUTANEOUS at 08:30

## 2021-03-31 NOTE — ASSESSMENT & PLAN NOTE
Lab Results   Component Value Date    EGFR 7 03/30/2021    EGFR 7 03/29/2021    EGFR 8 03/28/2021    CREATININE 6 16 (H) 03/30/2021    CREATININE 5 95 (H) 03/29/2021    CREATININE 5 49 (H) 03/28/2021     Patient has stage IV chronic disease with baseline creatinine between 1 49-2  15  She presented with acute kidney injury  She has nephrotic range proteinuria  Nephrology was consulted  Patient underwent renal biopsy on March 25th  She denies any abdominal pain or signs of bleeding  Patient creatinine was 6 16 on 03/30  Awaiting blood work from this morning  Patient was on Bumex drip which was discontinued by Nephrology  In the biopsy results showed severe nodular diabetic glomerular sclerosis with severe tubular atrophy and interstitial fibrosis with severe arterial sclerosis and atherosclerosis with hyalinosis  Patient had dialysis catheter placed by IR and underwent dialysis yesterday on 03/30/2021  Plan is to do 2nd session of dialysis today

## 2021-03-31 NOTE — ASSESSMENT & PLAN NOTE
Lab Results   Component Value Date    HGBA1C 7 6 (H) 03/15/2021       Recent Labs     03/30/21  0815 03/30/21  1107 03/30/21  1632 03/30/21  2133   POCGLU 91 88 96 210*       Blood Sugar Average: Last 72 hrs:  · (P) 110 [id number]     Patient has type 2 diabetes on insulin with stage IV chronic disease  Continue Humalog 3 units before meals, and Lantus 25 units subcu daily

## 2021-03-31 NOTE — CASE MANAGEMENT
Spoke with [employer] and they are in receipt of clinical information for dialysis and are reviewing it

## 2021-03-31 NOTE — PLAN OF CARE
Problem: Potential for Falls  Goal: Patient will remain free of falls  Description: INTERVENTIONS:  - Assess patient frequently for physical needs  -  Identify cognitive and physical deficits and behaviors that affect risk of falls    -  [place] fall precautions as indicated by assessment   - Educate patient/family on patient safety including physical limitations  - Instruct patient to call for assistance with activity based on assessment  - Modify environment to reduce risk of injury  - Consider OT/PT consult to assist with strengthening/mobility  Outcome: Progressing

## 2021-03-31 NOTE — WOUND OSTOMY CARE
Progress Note - Wound   Kristine Crowley 60 y o  female MRN: [id number]  Unit/Bed#: -01 Encounter: [id number]        Assessment:   Attempted to see patient for wound follow up  Patient refused assessment  Explained to patient reason for wanted to see previously documented wound to assess healing  Again patient refused  Patient states that, "it is just a scratch and it is healing " Podiatry is following patient's leg wounds  Wound care will sign off  Skin Care orders:  1-Hydraguard to sacrum, buttocks BID  and PRN  2-EHOB cushion when out of bed in chair  3-Moisturize skin daily with skin nourishing cream  4-Elevate heels to offload pressure    5-Turn/reposition q2h for pressure re-distribution on skin      Wound Care will sign off  Call or Tigertext with any questions

## 2021-03-31 NOTE — CASE MANAGEMENT
LOS:16  Patient with first dialysis treatment 3/30/21 and will need outpatient chair  She is requesting [facility] dialysis at [facility]  Faxed initial paperwork to Frank arreola at [phone]

## 2021-03-31 NOTE — ASSESSMENT & PLAN NOTE
Patient has normocytic anemia  Iron studies revealed anemia of chronic disease  Patient denies any signs of bleeding  Patient declined blood transfusion earlier this this week when hemoglobin was 6 7  Hemoglobin on 03/30 was 6 3  Discussed with patient again and she declined blood transfusion and wants to monitor the H&H  Patient is receiving Venofer and awaiting blood work from this morning  She has no abdominal tenderness and denies abdominal pain    Patient is on subcutaneous heparin for DVT prevention purposes

## 2021-03-31 NOTE — ASSESSMENT & PLAN NOTE
Wt Readings from Last 3 Encounters:   03/30/21 129 kg (284 lb 6 3 oz)   02/21/20 117 kg (258 lb)   01/07/20 117 kg (258 lb 12 8 oz)     Patient was admitted with increased lower extremity edema due to acute systolic CHF due to noncompliance with medications  Ejection fraction was 45-50% on this admission  Patient was treated with IV Bumex then IV diuretics were held because of worsening renal function then restarted on 3/26/2021 due to increasing lower extremity edema and oxygen requirement   Lungs are clear to auscultation but patient has worsening lower extremity edema  Patient was switched to Bumex drip on 03/29  Bumex drip was discontinued on 03/30 Daily weight and I&Os  Patient underwent dialysis yesterday and is planned for another session of dialysis today

## 2021-03-31 NOTE — PLAN OF CARE
Post-Dialysis RN Treatment Note    Blood Pressure:  Pre 137/66 mm/Hg  Post 122/63 mmHg   EDW  TBD kg    Weight:  Pre 126 7 kg   Post 126 7 kg   Mode of weight measurement: Standing Scale   Volume Removed  0 ml    Treatment duration 45 minutes    NS given  Yes, Pt c/o light headed & nausea    Treatment shortened?  Yes, describe: Pt sick Dr Delmer Pablo Notified   Medications given during Rx None Reported   Estimated Kt/V  Not Applicable   Access type: Permacath/TDC   Access Status: Yes, describe: able to maintain BFR     Report called to primary nurse   Yes  Mercedes STAUFFER      Problem: METABOLIC, FLUID AND ELECTROLYTES - ADULT  Goal: Electrolytes maintained within normal limits  Description: INTERVENTIONS:  - Monitor labs and assess patient for signs and symptoms of electrolyte imbalances  - Administer electrolyte replacement as ordered  - Monitor response to electrolyte replacements, including repeat lab results as appropriate  - Instruct patient on fluid and nutrition as appropriate  Outcome: Progressing  Goal: Fluid balance maintained  Description: INTERVENTIONS:  - Monitor labs   - Monitor I/O and WT  - Instruct patient on fluid and nutrition as appropriate  - Assess for signs & symptoms of volume excess or deficit  Outcome: Progressing

## 2021-03-31 NOTE — PROGRESS NOTES
NEPHROLOGY PROGRESS NOTE   Nishi Brar 60 y o  female MRN: [id number]  Unit/Bed#: -01 Encounter: [id number]  Reason for Consult: ADI (POA) on CKD IV, now on HD    PLAN:   -for 2nd dialysis treatment today  Plan on 3rd dialysis treatment tomorrow   -case management assisting with outpatient dialysis unit placement  Hepatitis panel completed  -will check Mag and phosphorus level today  Continues on PhosLo with meals  -hemoglobin 6 6 03/30, patient refusing blood transfusion  Receiving IV iron and Epogen with HD     ASSESSMENT/PLAN:  Acute kidney injury (POA) on CKD stage 4, versus underlying progression of chronic disease:  Suspected prerenal secondary to volume depletion in the setting of diarrhea and hypotension as well as ATN with cellulitis  -presents with creatinine of 3 23   -baseline creatinine previously low 2's in 2020    -previously received IV albumin for hypotension/3rd spacing and IV Bumex drip, discontinued 3/30  -repeat UA shows protein, 0-1 RBCs, 2-4 hyaline casts, 5-10 granular cast   -bladder scan unremarkable, urine eosinophils 0, UCPR: 4 57 g  -BHUMI, hepatitis panel, complements, rapid HIV, RPR, RF, ASO, anti-DNA double stranded, GBM, cryo, ANCA level, antiPLA 2 R: negative    -imaging unremarkable for hydronephrosis  -S/P renal biopsy 03/25 which showed diabetic glomerular sclerosis, with severe tubular atrophy, interstitial fibrosis, severe arterial sclerosis, likely progressed to ESRD  -initiated on dialysis 3/30  For 2nd dialysis treatment today   -avoid nephrotoxins, hypotension, IV contrast   -hepatitis panel completed    -case management assisting with outpatient dialysis placement  Patient requesting [facility]  Access: Tunneled dialysis catheter  Site intact   Placed 03/30/21       Proteinuria:  -previous UPCR 3 g in 2018, recent UPCR 4 57    -SPEP negative for monoclonal gammopathy   -not on Ace/Arb   -status post renal biopsy, please see above for results        Hypertension: initially hypotensive,  blood pressure now stable between 120s-150s  -avoid hypotension or high fluctuations in blood pressure   -outpatient medications:  Coreg 25 mg 2 times per day, hydralazine 75 mg 2 times per day, amlodipine 2 5 mg daily, torsemide 40 mg daily   -continues on Coreg 25 mg b i d , and hydralazine 50 mg 2 times per day    -holding parameters adjusted on antihypertensive for systolic blood pressure less than 645       Diastolic heart failure:  Presenting with shortness of breath and lower extremity edema   Reports stop taking torsemide approximately 1 week ago   Most recent echo showed EF of 45-50% with grade 2 diastolic dysfunction, mild-to-moderate AS, dilated IVC  -chest x-ray negative for acute cardiopulmonary disease   -continue daily weights, I/O, fluid restriction  -outpatient diuretic:  Torsemide 40 mg daily  -previous bleed on Bumex drip  Discontinued 3/30  -initiated on dialysis 3/30  Will continue with UF on HD      Bilateral lower extremity edema:  Suspect 3rd spacing   -lower extremity Doppler negative for DVT  -continue to elevate legs throughout the day  -recommend low-salt diet   -received IV albumin      Anemia:   -continue to monitor and transfuse as needed for hemoglobin less than 7 0   -patient refusing blood transfusion   -iron panel showed iron sat 14%  -received IV Venofer this admissions  -started on Epogen   -work up negative for monoclonal gammopathy    -checking occult stools       Lower extremity cellulitis: (resolved)  -completed antibiotics      MBD in CKD:  -hyperphosphatemia, continues on PhosLo with meals   Will check phos level today   -Will continue to monitor       Hypernatremia:  Resolved  -continue to monitor and trend      Other: Diabetes, obesity, CAD  Disposition:  Requiring additional stay due to medical needs  SUBJECTIVE:  The patient is sitting out of bed in her chair  She appears comfortable   She denies chest pain or increased SOB  She is for 2nd HD treatment today       OBJECTIVE:  Current Weight: Weight - Scale: 129 kg (284 lb 6 3 oz)  Vitals:    03/30/21 1530 03/30/21 1600 03/30/21 2209 03/31/21 0700   BP: 154/55 143/58 139/80 128/57   BP Location: Right arm Right arm  Right arm   Pulse: 63 59 59 61   Resp: 18 18 22 16   Temp:  98 1 °F (36 7 °C) 98 °F (36 7 °C) 98 2 °F (36 8 °C)   TempSrc:  Oral Oral Oral   SpO2:   97% 97%   Weight:       Height:           Intake/Output Summary (Last 24 hours) at 3/31/2021 1421  Last data filed at 3/30/2021 1600  Gross per 24 hour   Intake 500 ml   Output 1000 ml   Net -500 ml     General: NAD  Skin: warm, dry, intact, no rash  HEENT: Moist mucous membranes, sclera anicteric, normocephalic, atraumatic  Neck: No apparent JVD appreciated  Chest: lung sounds clear B/L, on O2, perm cath   CVS:Regular rate and rhythm, no murmer   Abdomen: Soft, round, non-tender, +BS, obese  Extremities: B/L LE edema present  Neuro: alert and oriented  Psych: appropriate mood and affect     Medications:    Current Facility-Administered Medications:     acetaminophen (TYLENOL) tablet 650 mg, 650 mg, Oral, Q6H PRN, Anabelle Asher PA-C, 650 mg at 03/17/21 2320    atorvastatin (LIPITOR) tablet 40 mg, 40 mg, Oral, Daily, Sarah Sanchez PA-C, 40 mg at 03/31/21 0830    calcium acetate (PHOSLO) capsule 667 mg, 667 mg, Oral, TID With Meals, Lisa Mendoza, CHARLYNP, 667 mg at 03/31/21 7544    carvedilol (COREG) tablet 25 mg, 25 mg, Oral, BID, HOWARD Gutierrez, 25 mg at 03/31/21 0830    dextrose 50 % IV solution 25 mL, 25 mL, Intravenous, Once, Heather Gomez DO    diphenhydrAMINE (BENADRYL) tablet 25 mg, 25 mg, Oral, Q6H PRN, Sarah Sanchez PA-C, 25 mg at 03/17/21 0545    heparin (porcine) subcutaneous injection 5,000 Units, 5,000 Units, Subcutaneous, Q8H [address], Ronaldo Samriento MD, 5,000 Units at 03/30/21 2209    hydrALAZINE (APRESOLINE) tablet 50 mg, 50 mg, Oral, BID, Hitesh Adam MD, 50 mg at 03/31/21 0830    insulin glargine (LANTUS) subcutaneous injection 25 Units 0 25 mL, 25 Units, Subcutaneous, QAM, Jones King MD, 25 Units at 03/31/21 0830    insulin lispro (HumaLOG) 100 units/mL subcutaneous injection 1-6 Units, 1-6 Units, Subcutaneous, TID AC, 1 Units at 03/31/21 0832 **AND** Fingerstick Glucose (POCT), , , TID AC, Sarah Sanchez PA-C    insulin lispro (HumaLOG) 100 units/mL subcutaneous injection 1-6 Units, 1-6 Units, Subcutaneous, HS, Sarah Sanchez PA-C, 2 Units at 03/30/21 2209    insulin lispro (HumaLOG) 100 units/mL subcutaneous injection 3 Units, 3 Units, Subcutaneous, TID With Meals, Jones King MD, 3 Units at 03/31/21 0832    ondansetron (ZOFRAN) injection 4 mg, 4 mg, Intravenous, Q6H PRN, Thea Box MD, 4 mg at 03/25/21 1221    oxyCODONE (ROXICODONE) IR tablet 5 mg, 5 mg, Oral, Q6H PRN, Thea Box MD, 5 mg at 03/30/21 1722    Laboratory Results:  Results from last 7 days   Lab Units 03/30/21 2009 03/30/21  2729 03/29/21  0610 03/28/21  0556 03/27/21  0521  03/26/21  0601 03/25/21  0411   WBC Thousand/uL  --  7 34  --  10 57* 10 08  --  13 83* 10 26*   HEMOGLOBIN g/dL 6 6* 6 3*  --  7 2* 7 2*   < > 6 7* 7 1*   HEMATOCRIT % 22 2* 21 9*  --  24 8* 25 0*   < > 23 0* 24 2*   PLATELETS Thousands/uL  --  170  --  185 186  --  201 215   SODIUM mmol/L  --  145 145 145 142  --  143 146*   POTASSIUM mmol/L  --  5 2 5 2 5 2 5 0  --  4 8 4 7   CHLORIDE mmol/L  --  107 108 107 107  --  108 110*   CO2 mmol/L  --  26 27 26 22  --  23 25   BUN mg/dL  --  107* 100* 89* 82*  --  76* 77*   CREATININE mg/dL  --  6 16* 5 95* 5 49* 4 93*  --  4 31* 4 10*   CALCIUM mg/dL  --  7 8* 8 0* 7 9* 8 3  --  8 0* 8 0*   PHOSPHORUS mg/dL  --   --   --   --   --   --  4 8* 3 7    < > = values in this interval not displayed

## 2021-04-01 ENCOUNTER — APPOINTMENT (INPATIENT)
Dept: DIALYSIS | Facility: HOSPITAL | Age: 66
DRG: 698 | End: 2021-04-01
Attending: INTERNAL MEDICINE
Payer: COMMERCIAL

## 2021-04-01 LAB
ANION GAP SERPL CALCULATED.3IONS-SCNC: 15 MMOL/L (ref 4–13)
BASOPHILS # BLD AUTO: 0.03 THOUSANDS/ΜL (ref 0–0.1)
BASOPHILS NFR BLD AUTO: 0 % (ref 0–1)
BUN SERPL-MCNC: 85 MG/DL (ref 5–25)
CALCIUM SERPL-MCNC: 8.3 MG/DL (ref 8.3–10.1)
CHLORIDE SERPL-SCNC: 104 MMOL/L (ref 100–108)
CO2 SERPL-SCNC: 23 MMOL/L (ref 21–32)
CREAT SERPL-MCNC: 5.65 MG/DL (ref 0.6–1.3)
EOSINOPHIL # BLD AUTO: 0.88 THOUSAND/ΜL (ref 0–0.61)
EOSINOPHIL NFR BLD AUTO: 11 % (ref 0–6)
ERYTHROCYTE [DISTWIDTH] IN BLOOD BY AUTOMATED COUNT: 17.4 % (ref 11.6–15.1)
GFR SERPL CREATININE-BSD FRML MDRD: 7 ML/MIN/1.73SQ M
GLUCOSE SERPL-MCNC: 116 MG/DL (ref 65–140)
GLUCOSE SERPL-MCNC: 217 MG/DL (ref 65–140)
GLUCOSE SERPL-MCNC: 72 MG/DL (ref 65–140)
GLUCOSE SERPL-MCNC: 90 MG/DL (ref 65–140)
GLUCOSE SERPL-MCNC: 95 MG/DL (ref 65–140)
HCT VFR BLD AUTO: 23.2 % (ref 34.8–46.1)
HGB BLD-MCNC: 6.6 G/DL (ref 11.5–15.4)
IMM GRANULOCYTES # BLD AUTO: 0.04 THOUSAND/UL (ref 0–0.2)
IMM GRANULOCYTES NFR BLD AUTO: 1 % (ref 0–2)
LYMPHOCYTES # BLD AUTO: 0.84 THOUSANDS/ΜL (ref 0.6–4.47)
LYMPHOCYTES NFR BLD AUTO: 11 % (ref 14–44)
MCH RBC QN AUTO: 28.1 PG (ref 26.8–34.3)
MCHC RBC AUTO-ENTMCNC: 28.4 G/DL (ref 31.4–37.4)
MCV RBC AUTO: 99 FL (ref 82–98)
MONOCYTES # BLD AUTO: 0.58 THOUSAND/ΜL (ref 0.17–1.22)
MONOCYTES NFR BLD AUTO: 7 % (ref 4–12)
NEUTROPHILS # BLD AUTO: 5.62 THOUSANDS/ΜL (ref 1.85–7.62)
NEUTS SEG NFR BLD AUTO: 70 % (ref 43–75)
NRBC BLD AUTO-RTO: 0 /100 WBCS
PHOSPHATE SERPL-MCNC: 4.6 MG/DL (ref 2.3–4.1)
PLATELET # BLD AUTO: 105 THOUSANDS/UL (ref 149–390)
PMV BLD AUTO: 11 FL (ref 8.9–12.7)
POTASSIUM SERPL-SCNC: 4.5 MMOL/L (ref 3.5–5.3)
PTH-INTACT SERPL-MCNC: 277.4 PG/ML (ref 18.4–80.1)
RBC # BLD AUTO: 2.35 MILLION/UL (ref 3.81–5.12)
SODIUM SERPL-SCNC: 142 MMOL/L (ref 136–145)
WBC # BLD AUTO: 7.99 THOUSAND/UL (ref 4.31–10.16)

## 2021-04-01 PROCEDURE — 97164 PT RE-EVAL EST PLAN CARE: CPT

## 2021-04-01 PROCEDURE — 83970 ASSAY OF PARATHORMONE: CPT | Performed by: INTERNAL MEDICINE

## 2021-04-01 PROCEDURE — 84100 ASSAY OF PHOSPHORUS: CPT | Performed by: INTERNAL MEDICINE

## 2021-04-01 PROCEDURE — 97168 OT RE-EVAL EST PLAN CARE: CPT

## 2021-04-01 PROCEDURE — 80048 BASIC METABOLIC PNL TOTAL CA: CPT | Performed by: INTERNAL MEDICINE

## 2021-04-01 PROCEDURE — 85025 COMPLETE CBC W/AUTO DIFF WBC: CPT | Performed by: INTERNAL MEDICINE

## 2021-04-01 PROCEDURE — 99232 SBSQ HOSP IP/OBS MODERATE 35: CPT | Performed by: INTERNAL MEDICINE

## 2021-04-01 PROCEDURE — 82948 REAGENT STRIP/BLOOD GLUCOSE: CPT

## 2021-04-01 RX ORDER — ASPIRIN 81 MG/1
81 TABLET ORAL DAILY
Status: DISCONTINUED | OUTPATIENT
Start: 2021-04-01 | End: 2021-04-05 | Stop reason: HOSPADM

## 2021-04-01 RX ORDER — MIDODRINE HYDROCHLORIDE 5 MG/1
10 TABLET ORAL
Status: DISCONTINUED | OUTPATIENT
Start: 2021-04-03 | End: 2021-04-05 | Stop reason: HOSPADM

## 2021-04-01 RX ORDER — CARVEDILOL 12.5 MG/1
12.5 TABLET ORAL 2 TIMES DAILY
Status: DISCONTINUED | OUTPATIENT
Start: 2021-04-01 | End: 2021-04-05 | Stop reason: HOSPADM

## 2021-04-01 RX ORDER — CLOPIDOGREL BISULFATE 75 MG/1
75 TABLET ORAL DAILY
Status: DISCONTINUED | OUTPATIENT
Start: 2021-04-01 | End: 2021-04-05 | Stop reason: HOSPADM

## 2021-04-01 RX ADMIN — IRON SUCROSE 100 MG: 20 INJECTION, SOLUTION INTRAVENOUS at 10:03

## 2021-04-01 RX ADMIN — CARVEDILOL 12.5 MG: 12.5 TABLET, FILM COATED ORAL at 17:10

## 2021-04-01 RX ADMIN — DIPHENHYDRAMINE HYDROCHLORIDE 25 MG: 25 TABLET ORAL at 03:53

## 2021-04-01 RX ADMIN — EPOETIN ALFA 10000 UNITS: 10000 SOLUTION INTRAVENOUS; SUBCUTANEOUS at 13:53

## 2021-04-01 RX ADMIN — INSULIN LISPRO 2 UNITS: 100 INJECTION, SOLUTION INTRAVENOUS; SUBCUTANEOUS at 21:48

## 2021-04-01 RX ADMIN — CLOPIDOGREL BISULFATE 75 MG: 75 TABLET ORAL at 10:03

## 2021-04-01 RX ADMIN — INSULIN GLARGINE 25 UNITS: 100 INJECTION, SOLUTION SUBCUTANEOUS at 08:23

## 2021-04-01 RX ADMIN — ATORVASTATIN CALCIUM 40 MG: 40 TABLET, FILM COATED ORAL at 08:22

## 2021-04-01 RX ADMIN — HEPARIN SODIUM 5000 UNITS: 5000 INJECTION INTRAVENOUS; SUBCUTANEOUS at 14:40

## 2021-04-01 RX ADMIN — CALCIUM ACETATE 667 MG: 667 CAPSULE ORAL at 17:10

## 2021-04-01 RX ADMIN — CARVEDILOL 25 MG: 12.5 TABLET, FILM COATED ORAL at 08:23

## 2021-04-01 RX ADMIN — ASPIRIN 81 MG: 81 TABLET, COATED ORAL at 10:03

## 2021-04-01 RX ADMIN — INSULIN LISPRO 3 UNITS: 100 INJECTION, SOLUTION INTRAVENOUS; SUBCUTANEOUS at 08:24

## 2021-04-01 RX ADMIN — INSULIN LISPRO 3 UNITS: 100 INJECTION, SOLUTION INTRAVENOUS; SUBCUTANEOUS at 12:23

## 2021-04-01 RX ADMIN — CALCIUM ACETATE 667 MG: 667 CAPSULE ORAL at 12:23

## 2021-04-01 RX ADMIN — CALCIUM ACETATE 667 MG: 667 CAPSULE ORAL at 08:22

## 2021-04-01 NOTE — ASSESSMENT & PLAN NOTE
Patient has coronary disease and she status post stent placements in 2018  She denies any chest pain or shortness of breath currently    Restarted on aspirin and Plavix  Continue atorvastatin

## 2021-04-01 NOTE — PROGRESS NOTES
[place]  Progress Note - Cloteal Kendall 1955, 60 y o  female MRN: [id number]  Unit/Bed#: -01 Encounter: [id number]  Primary Care Provider: Amy Leyva DO   Date and time admitted to hospital: 3/15/2021  7:09 PM    Foot ulcer, left ([address] )  Assessment & Plan  · Patient with ulcer on left heel of the foot  · Patient is unaware when ulcer started but notes the rash on her lower extremities began about 6 weeks ago  · Patient reports increased pain with touch and walking over the past week  · Patient with poor compliance of diabetes insulin  · Patient completed the course of cefepime  · XR left foot completed on 03/15 with calcaneal spurring, no acute osseous abnormality  · Podiatry consult and recommendations appreciated  · Wound care and dressing changes per Podiatry recommendations    Acute kidney injury superimposed on chronic kidney disease [facility])  Assessment & Plan  Lab Results   Component Value Date    EGFR 7 04/01/2021    EGFR 7 03/31/2021    EGFR 7 03/30/2021    CREATININE 5 65 (H) 04/01/2021    CREATININE 5 75 (H) 03/31/2021    CREATININE 6 16 (H) 03/30/2021     Patient has stage IV chronic disease with baseline creatinine between 1 49-2  15  She presented with acute kidney injury  She has nephrotic range proteinuria  Nephrology was consulted  Patient underwent renal biopsy on March 25th  She denies any abdominal pain or signs of bleeding  Patient creatinine was 6 16 on 03/30  Creatinine this morning is 5 65  Patient was on Bumex drip which was discontinued by Nephrology  Renal biopsy results showed severe nodular diabetic glomerular sclerosis with severe tubular atrophy and interstitial fibrosis with severe arterial sclerosis and atherosclerosis with hyalinosis  Patient had dialysis catheter placed by IR and underwent dialysis yesterday on 03/30/2021 and 03/31/2021    Patient is scheduled for another session of dialysis today    Non compliance w medication regimen  Assessment & Plan  · Patient reports she stopped taking her insulin around Thanksgiving 2020, her torsemide about 1 week ago and has not been seeing wound care for her legs,   · Patient had been seeing wound care for wounds of her bilateral lower extremities but stopped going due to the fear of jennifer Covid-19  · Consult case management    Cellulitis of both lower extremities  Assessment & Plan  Patient admitted with bilateral lower extremity cellulitis and was treated with IV cefepime and vancomycin  Cellulitis resolved  Anemia due to stage 4 chronic kidney disease [facility])  Assessment & Plan  Patient has normocytic anemia  Iron studies revealed anemia of chronic disease  Patient denies any signs of bleeding  Patient declined blood transfusion earlier this this week when hemoglobin was 6 7  Hemoglobin today is 6 6  Discussed with patient again and she declined blood transfusion and wants to monitor the H&H  No active or overt rectal bleeding  Patient is receiving Venofer  She has no abdominal tenderness and denies abdominal pain  Patient is on subcutaneous heparin for DVT prevention purposes    Type 2 diabetes mellitus with stage 4 chronic kidney disease, with long-term current use of insulin [facility])  Assessment & Plan  Lab Results   Component Value Date    HGBA1C 7 6 (H) 03/15/2021       Recent Labs     03/31/21  1106 03/31/21  1624 03/31/21  2046 04/01/21  0730   POCGLU 99 98 134 116       Blood Sugar Average: Last 72 hrs:  · (P) 112 5     Patient has type 2 diabetes on insulin with stage IV chronic disease  Continue Humalog 3 units before meals, and Lantus 25 units subcu daily   Coronary artery disease involving native coronary artery of native heart without angina pectoris  Assessment & Plan  Patient has coronary disease and she status post stent placements in 2018  She denies any chest pain or shortness of breath currently    Restarted on aspirin and Plavix  Continue atorvastatin    Essential hypertension  Assessment & Plan  · Continue home carvedilol 25 mg b i d , hydralazine 75 mg b i d   · Continue to monitor blood pressure per unit protocol    Class 3 severe obesity with body mass index (BMI) of 45 0 to 49 9 in adult [facility])  Assessment & Plan  Body mass index is 47 96 kg/m²  · Encourage lifestyle changes  · Consult nutrition    * Acute on chronic combined systolic and diastolic congestive heart failure (HCC)  Assessment & Plan  Wt Readings from Last 3 Encounters:   04/01/21 127 kg (279 lb 6 4 oz)   02/21/20 117 kg (258 lb)   01/07/20 117 kg (258 lb 12 8 oz)     Patient was admitted with increased lower extremity edema due to acute systolic CHF due to noncompliance with medications  Ejection fraction was 45-50% on this admission  Patient was treated with IV Bumex then IV diuretics were held because of worsening renal function then restarted on 3/26/2021 due to increasing lower extremity edema and oxygen requirement   Lungs are clear to auscultation but patient has worsening lower extremity edema  Patient was switched to Bumex drip on 03/29  Bumex drip was discontinued on 03/30 Daily weight and I&Os  Continue on dialysis  Patient underwent dialysis yesterday and is planned for another session of dialysis today  Labs & Imaging: I have personally reviewed pertinent reports  VTE Pharmacologic Prophylaxis: Heparin  VTE Mechanical Prophylaxis: sequential compression device    Code Status:   Level 1 - Full Code    Patient Centered Rounds: I have performed bedside rounds with nursing staff today  Discussions with Specialists or Other Care Team Provider:  Nephrology    Education and Discussions with Family / Patient:  Patient is updating her daughter  Current Length of Stay: 17 day(s)    Current Patient Status: Inpatient   Certification Statement: The patient will continue to require additional inpatient hospital stay due to see my assessment and plan  Subjective:   Patient is seen and examined at bedside  Denies any new complaints  Patient is on 4 L of supplemental oxygen  Denies any chest pain, nausea, vomiting or abdominal pain  All other ROS are negative  Objective:    Vitals: Blood pressure 130/60, pulse 82, temperature 98 4 °F (36 9 °C), temperature source Oral, resp  rate 18, height 5' 4" (1 626 m), weight 127 kg (279 lb 6 4 oz), SpO2 94 %, not currently breastfeeding  ,Body mass index is 47 96 kg/m²  SPO2 RA Rest      ED to Hosp-Admission (Current) from 3/15/2021 in [address] Med Surg Unit   SpO2  94 %   SpO2 Activity  At Rest   O2 Device  None (Room air)   O2 Flow Rate  --        I&O:     Intake/Output Summary (Last 24 hours) at 4/1/2021 0914  Last data filed at 4/1/2021 0501  Gross per 24 hour   Intake 920 ml   Output --   Net 920 ml       Physical Exam:    General- Alert, lying comfortably in bed  Not in any acute distress  Neck- Supple, No JVD  CVS- regular, S1 and S2 normal  Chest- Bilateral Air entry, No rhochi, crackles or wheezing present  Abdomen- soft, nontender, not distended, no guarding or rigidity, BS+  Extremities-  has pedal edema, No calf tenderness  Bilateral lower extremity dressings in place  CNS-   Alert, awake and orientedx3  No focal deficits present  Invasive Devices     Peripheral Intravenous Line            Peripheral IV 04/01/21 Dorsal (posterior); Left Wrist less than 1 day          Hemodialysis Catheter            HD Permanent Double Catheter 1 day                      Social History  reviewed  History reviewed  No pertinent family history   reviewed    Meds:  Current Facility-Administered Medications   Medication Dose Route Frequency Provider Last Rate Last Admin    acetaminophen (TYLENOL) tablet 650 mg  650 mg Oral Q6H PRN Sarah Harper PA-C   650 mg at 03/17/21 2320    atorvastatin (LIPITOR) tablet 40 mg  40 mg Oral Daily Sarah Sanchez PA-C   40 mg at 04/01/21 1397    calcium acetate (PHOSLO) capsule 667 mg  667 mg Oral TID With Meals Haily Mccall CRNP   667 mg at 04/01/21 6833    carvedilol (COREG) tablet 25 mg  25 mg Oral BID Brittney Lizarraga CRNP   25 mg at 04/01/21 9385    dextrose 50 % IV solution 25 mL  25 mL Intravenous Once Jami Landaverde DO        diphenhydrAMINE (BENADRYL) tablet 25 mg  25 mg Oral Q6H PRN Olga Kimble PA-C   25 mg at 04/01/21 0353    epoetin cedric (EPOGEN,PROCRIT) injection 10,000 Units  10,000 Units Intravenous After Dialysis Jas Marcus MD        heparin (porcine) subcutaneous injection 5,000 Units  5,000 Units Subcutaneous [facility] Rosana Osei MD   Stopped at 04/01/21 9927    insulin glargine (LANTUS) subcutaneous injection 25 Units 0 25 mL  25 Units Subcutaneous QAM Rosana Osei MD   25 Units at 04/01/21 0823    insulin lispro (HumaLOG) 100 units/mL subcutaneous injection 1-6 Units  1-6 Units Subcutaneous TID AC Olga Kimble PA-C   1 Units at 03/31/21 2086    insulin lispro (HumaLOG) 100 units/mL subcutaneous injection 1-6 Units  1-6 Units Subcutaneous HS Olga Kimble PA-C   2 Units at 03/30/21 2209    insulin lispro (HumaLOG) 100 units/mL subcutaneous injection 3 Units  3 Units Subcutaneous TID With Meals Rosana Osei MD   3 Units at 04/01/21 0824    iron sucrose (VENOFER) 100 mg in sodium chloride 0 9 % 100 mL IVPB  100 mg Intravenous Once Fiordaliza Ulrich MD        ondansetron [facility]) injection 4 mg  4 mg Intravenous Q6H PRN Fiordaliza Ulrich MD   4 mg at 03/25/21 1221    oxyCODONE (ROXICODONE) IR tablet 5 mg  5 mg Oral Q6H PRN Fiordaliza Ulrich MD   5 mg at 03/30/21 1722      Medications Prior to Admission   Medication    aspirin (ECOTRIN LOW STRENGTH) 81 mg EC tablet    atorvastatin (LIPITOR) 40 mg tablet    carvedilol (COREG) 25 mg tablet    clopidogrel (PLAVIX) 75 mg tablet    hydrALAZINE (APRESOLINE) 50 mg tablet    torsemide (DEMADEX) 20 mg tablet    amLODIPine (NORVASC) 2 5 mg tablet    insulin glargine (Toujeo SoloStar) 300 units/mL CONCETRATED U-300 injection pen (1-unit dial)    nitroglycerin (NITROSTAT) 0 4 mg SL tablet       Labs:  Results from last 7 days   Lab Units 04/01/21  0626 03/30/21  2009 03/30/21  0628 03/28/21  0556  03/26/21  0601   WBC Thousand/uL 7 99  --  7 34 10 57*   < > 13 83*   HEMOGLOBIN g/dL 6 6* 6 6* 6 3* 7 2*   < > 6 7*   HEMATOCRIT % 23 2* 22 2* 21 9* 24 8*   < > 23 0*   PLATELETS Thousands/uL 105*  --  170 185   < > 201   NEUTROS PCT % 70  --  72  --   --  82*   LYMPHS PCT % 11*  --  10*  --   --  6*   MONOS PCT % 7  --  6  --   --  7   EOS PCT % 11*  --  11*  --   --  4    < > = values in this interval not displayed  Results from last 7 days   Lab Units 04/01/21  0626 03/31/21  1632 03/30/21  0628   POTASSIUM mmol/L 4 5 4 8 5 2   CHLORIDE mmol/L 104 107 107   CO2 mmol/L 23 28 26   BUN mg/dL 85* 94* 107*   CREATININE mg/dL 5 65* 5 75* 6 16*   CALCIUM mg/dL 8 3 8 0* 7 8*     Lab Results   Component Value Date    TROPONINI <0 02 03/15/2021    TROPONINI 0 02 01/07/2020    TROPONINI <0 02 05/11/2017    CKTOTAL 53 04/07/2014         Lab Results   Component Value Date    BLOODCX No Growth After 5 Days  03/15/2021    BLOODCX No Growth After 5 Days  03/15/2021    URINECX <10,000 cfu/ml  03/16/2021         Imaging:  Results for orders placed during the hospital encounter of 03/15/21   XR chest 1 view portable    Narrative CHEST     INDICATION:   weakness  COMPARISON:  Chest radiograph from 1/7/2020 and 5/11/2017  EXAM PERFORMED/VIEWS:  XR CHEST PORTABLE  FINDINGS:    Heart size exaggerated by the portable projection and suboptimal inspiration  Lungs clear  No effusion or pneumothorax  Osseous structures normal for age  Impression No acute cardiopulmonary disease  Workstation performed: [id number]       Results for orders placed during the hospital encounter of 01/07/20   XR chest 2 views    Narrative CHEST     INDICATION:   Chest Pain      COMPARISON:  5/11/2017    EXAM PERFORMED/VIEWS:  XR CHEST PA & LATERAL      FINDINGS:    Cardiomediastinal silhouette appears unremarkable  Crowding of the pulmonary markings secondary to shallow inspiration  Grossly clear lungs  No pneumothorax or pleural effusion  Osseous structures appear within normal limits for patient age  Impression No acute cardiopulmonary disease  Workstation performed: [id number]         Last 24 Hours Medication List:   Current Facility-Administered Medications   Medication Dose Route Frequency Provider Last Rate    acetaminophen  650 mg Oral Q6H PRN AreHARLAN Nguyen-C      atorvastatin  40 mg Oral Daily AreHARLAN Nguyen-C      calcium acetate  667 mg Oral TID With Meals Carmelo Holiday, CRNP      carvedilol  25 mg Oral BID Vidhyaflip Carney, CRLOS      dextrose  25 mL Intravenous Once Areta Moh, DO      diphenhydrAMINE  25 mg Oral Q6H PRN Arekarrie Aguilar PA-C      epoetin cedric  10,000 Units Intravenous After Dialysis Holden Hinds MD      heparin (porcine)  5,000 Units Subcutaneous [facility] Nova Chatman MD      insulin glargine  25 Units Subcutaneous QAM Nova Chatman MD      insulin lispro  1-6 Units Subcutaneous TID AC Sarah Sanchez PA-C      insulin lispro  1-6 Units Subcutaneous HS Sarah Sanchez PA-C      insulin lispro  3 Units Subcutaneous TID With Meals Nova Chatman MD      iron sucrose  100 mg Intravenous Once Jean Marie Don MD      ondansetron  4 mg Intravenous Q6H PRN Jean Marie Don MD      oxyCODONE  5 mg Oral Q6H PRN Jean Marie Don MD          Today, Patient Was Seen By: Jean Marie Don MD    ** Please Note: Dictation voice to text software may have been used in the creation of this document   **

## 2021-04-01 NOTE — CASE MANAGEMENT
Received call from Celestino Silva at [facility]  She reports that patient will have a chair at [facility] on [facility] at [phone]  OP dialysis to start Tuesday 4/6/21  Patient agreeable to chair days and time

## 2021-04-01 NOTE — ASSESSMENT & PLAN NOTE
Wt Readings from Last 3 Encounters:   04/01/21 127 kg (279 lb 6 4 oz)   02/21/20 117 kg (258 lb)   01/07/20 117 kg (258 lb 12 8 oz)     Patient was admitted with increased lower extremity edema due to acute systolic CHF due to noncompliance with medications  Ejection fraction was 45-50% on this admission  Patient was treated with IV Bumex then IV diuretics were held because of worsening renal function then restarted on 3/26/2021 due to increasing lower extremity edema and oxygen requirement   Lungs are clear to auscultation but patient has worsening lower extremity edema  Patient was switched to Bumex drip on 03/29  Bumex drip was discontinued on 03/30 Daily weight and I&Os  Continue on dialysis  Patient underwent dialysis yesterday and is planned for another session of dialysis today

## 2021-04-01 NOTE — ASSESSMENT & PLAN NOTE
Lab Results   Component Value Date    HGBA1C 7 6 (H) 03/15/2021       Recent Labs     03/31/21  1106 03/31/21  1624 03/31/21 2046 04/01/21  0730   POCGLU 99 98 134 116       Blood Sugar Average: Last 72 hrs:  · (P) 112 5     Patient has type 2 diabetes on insulin with stage IV chronic disease  Continue Humalog 3 units before meals, and Lantus 25 units subcu daily

## 2021-04-01 NOTE — PROGRESS NOTES
Progress Note - Podiatry  Viviana Avila 60 y o  female MRN: [id number]  Unit/Bed#: -01 Encounter: [id number]    Assessment/Plan:  1  Cellulitis bilateral legs              -secondary to #2&3 getting infected              -satisfactorily resolved  2  Skin ulceration left heel partial depth with DM2    - Continue with Maxorb Ag every other day  - Patient should follow-up at [address]  upon discharge  - patient again advised to avoid sleeping in recliner with legs dependent  3  Venous hypertension with ulceration and inflammation bilateral legs   -nursing advised to encourage elevation of extremities and to avoid dependence at all times  -multiple wounds bilateral legs continue to improve                 -continue with Xeroform, ABDs, Kerlix, and six-inch Ace wraps QOD   -dressing changes per nursing staff  4  Noncompliance with medication, anemia, ADI, essential hypertension, morbid obesity, DM2   -status post kidney biopsy, results pending              -managed per Internal Medicine and Nephrology      Subjective/Objective   Chief Complaint:   Chief Complaint   Patient presents with    Foot Pain     pt c/o left heel pain  pt has had pain for few days  pt also has ulcers on top of left foot  Subjective:  27-year-old white female resting in bed receiving hemodialysis  Denies any new pain or discomfort from legs  Relates legs continue to feel much better but she continues to gain water weight with extensive lower extremity edema  Denies any fever or shortness of breath  Blood pressure 138/54, pulse 66, temperature 97 8 °F (36 6 °C), temperature source Oral, resp  rate 18, height 5' 4" (1 626 m), weight 127 kg (279 lb 6 4 oz), SpO2 96 %, not currently breastfeeding  ,Body mass index is 47 96 kg/m²  Invasive Devices     Peripheral Intravenous Line            Peripheral IV 04/01/21 Dorsal (posterior); Left Wrist less than 1 day          Hemodialysis Catheter            HD Permanent Double Catheter 2 days                Physical Exam:   General appearance: alert, cooperative and no distress  Neuro/Vascular: Normal strength  Sensation and reflexes:  Diminished epicritic sensation  Pulses: Right: DP 0/4, PT 0/4, Left: DP 0/4, PT 0/4  Capillary Filling:  3 Sec, Edema:  + 2 - +3 edema bilateral  Extremity: Ulcers/Wounds:   · Left heel:  Partial depth ulcerative breakdown posterior lateral aspect, minimal serosanguineous drainage, 50% yellow, 50% pink/red, diminishing size, no evidence of acute infection  Less pain with palpation  Overall improving  · Bilateral legs:  Multiple partial depth venous hypertension ulcerations continue to diminish in size with much less  drainage  Erythema appears to have resolved  Overall legs are dramatically improved over initial presentation upon admission  Labs and other studies:   CBC w/diff  Results from last 7 days   Lab Units 04/01/21  0626   WBC Thousand/uL 7 99   HEMOGLOBIN g/dL 6 6*   HEMATOCRIT % 23 2*   PLATELETS Thousands/uL 105*   NEUTROS PCT % 70   LYMPHS PCT % 11*   MONOS PCT % 7   EOS PCT % 11*     BMP  Results from last 7 days   Lab Units 04/01/21  0626   POTASSIUM mmol/L 4 5   CHLORIDE mmol/L 104   CO2 mmol/L 23   BUN mg/dL 85*   CREATININE mg/dL 5 65*   CALCIUM mg/dL 8 3     CMP  Results from last 7 days   Lab Units 04/01/21  0626   POTASSIUM mmol/L 4 5   CHLORIDE mmol/L 104   CO2 mmol/L 23   BUN mg/dL 85*   CREATININE mg/dL 5 65*   CALCIUM mg/dL 8 3       @Culture@  Lab Results   Component Value Date    BLOODCX No Growth After 5 Days  03/15/2021    BLOODCX No Growth After 5 Days   03/15/2021    URINECX <10,000 cfu/ml  03/16/2021     No results found for: WOUNDCULT      Current Facility-Administered Medications:     acetaminophen (TYLENOL) tablet 650 mg, 650 mg, Oral, Q6H PRN, Sarah Sanchez PA-C, 650 mg at 03/17/21 2320    aspirin (ECOTRIN LOW STRENGTH) EC tablet 81 mg, 81 mg, Oral, Daily, Padmini Eastman MD, 81 mg at 04/01/21 1003    atorvastatin (LIPITOR) tablet 40 mg, 40 mg, Oral, Daily, Sarah Sanchez PA-C, 40 mg at 04/01/21 1275    calcium acetate (PHOSLO) capsule 667 mg, 667 mg, Oral, TID With Meals, Cleooz Daniels, CHARLYNP, 667 mg at 04/01/21 1710    carvedilol (COREG) tablet 12 5 mg, 12 5 mg, Oral, BID, Sunday Washington MD, 12 5 mg at 04/01/21 1710    clopidogrel (PLAVIX) tablet 75 mg, 75 mg, Oral, Daily, Tawny Mariano MD, 75 mg at 04/01/21 1003    dextrose 50 % IV solution 25 mL, 25 mL, Intravenous, Once, Dell Scheafer,     diphenhydrAMINE (BENADRYL) tablet 25 mg, 25 mg, Oral, Q6H PRN, Sarah Sanchez PA-C, 25 mg at 04/01/21 0353    epoetin cedric (EPOGEN,PROCRIT) injection 10,000 Units, 10,000 Units, Intravenous, After Dialysis, Sunday Washington MD, 10,000 Units at 04/01/21 1353    heparin (porcine) subcutaneous injection 5,000 Units, 5,000 Units, Subcutaneous, Q8H [facility], Maria Mckeon MD, 5,000 Units at 04/01/21 1440    insulin glargine (LANTUS) subcutaneous injection 25 Units 0 25 mL, 25 Units, Subcutaneous, QAM, Maria Mckeon MD, 25 Units at 04/01/21 0823    insulin lispro (HumaLOG) 100 units/mL subcutaneous injection 1-6 Units, 1-6 Units, Subcutaneous, TID AC, 1 Units at 03/31/21 0832 **AND** Fingerstick Glucose (POCT), , , TID AC, Sarah Sanchez PA-C    insulin lispro (HumaLOG) 100 units/mL subcutaneous injection 1-6 Units, 1-6 Units, Subcutaneous, HS, Sarah Sanchez PA-C, 2 Units at 03/30/21 2209    insulin lispro (HumaLOG) 100 units/mL subcutaneous injection 3 Units, 3 Units, Subcutaneous, TID With Meals, Maria Mckeon MD, 3 Units at 04/01/21 1223    [START ON 4/3/2021] midodrine (PROAMATINE) tablet 10 mg, 10 mg, Oral, Before Dialysis, Sunday Washington MD    ondansetron [facility]) injection 4 mg, 4 mg, Intravenous, Q6H PRN, Tawny Mariano MD, 4 mg at 03/25/21 1221    oxyCODONE (ROXICODONE) IR tablet 5 mg, 5 mg, Oral, Q6H PRN, Tawny Mariano MD, 5 mg at 03/30/21 1722    Imaging: I have personally reviewed pertinent films in PACS  EKG, Pathology, and Other Studies: I have personally reviewed pertinent reports    VTE Pharmacologic Prophylaxis: Heparin  VTE Mechanical Prophylaxis: reason for no mechanical VTE prophylaxis Bilateral leg wounds    Edgardo Mayfield DPM

## 2021-04-01 NOTE — ASSESSMENT & PLAN NOTE
Lab Results   Component Value Date    EGFR 7 04/01/2021    EGFR 7 03/31/2021    EGFR 7 03/30/2021    CREATININE 5 65 (H) 04/01/2021    CREATININE 5 75 (H) 03/31/2021    CREATININE 6 16 (H) 03/30/2021     Patient has stage IV chronic disease with baseline creatinine between 1 49-2  15  She presented with acute kidney injury  She has nephrotic range proteinuria  Nephrology was consulted  Patient underwent renal biopsy on March 25th  She denies any abdominal pain or signs of bleeding  Patient creatinine was 6 16 on 03/30  Creatinine this morning is 5 65  Patient was on Bumex drip which was discontinued by Nephrology  Renal biopsy results showed severe nodular diabetic glomerular sclerosis with severe tubular atrophy and interstitial fibrosis with severe arterial sclerosis and atherosclerosis with hyalinosis  Patient had dialysis catheter placed by IR and underwent dialysis yesterday on 03/30/2021 and 03/31/2021    Patient is scheduled for another session of dialysis today

## 2021-04-01 NOTE — PLAN OF CARE
Problem: Potential for Falls  Goal: Patient will remain free of falls  Description: INTERVENTIONS:  - Assess patient frequently for physical needs  -  Identify cognitive and physical deficits and behaviors that affect risk of falls    -  [place] fall precautions as indicated by assessment   - Educate patient/family on patient safety including physical limitations  - Instruct patient to call for assistance with activity based on assessment  - Modify environment to reduce risk of injury  - Consider OT/PT consult to assist with strengthening/mobility  Outcome: Progressing     Problem: PAIN - ADULT  Goal: Verbalizes/displays adequate comfort level or baseline comfort level  Description: Interventions:  - Encourage patient to monitor pain and request assistance  - Assess pain using appropriate pain scale  - Administer analgesics based on type and severity of pain and evaluate response  - Implement non-pharmacological measures as appropriate and evaluate response  - Consider cultural and social influences on pain and pain management  - Notify physician/advanced practitioner if interventions unsuccessful or patient reports new pain  Outcome: Progressing     Problem: INFECTION - ADULT  Goal: Absence or prevention of progression during hospitalization  Description: INTERVENTIONS:  - Assess and monitor for signs and symptoms of infection  - Monitor lab/diagnostic results  - Monitor all insertion sites, i e  indwelling lines, tubes, and drains  - Monitor endotracheal if appropriate and nasal secretions for changes in amount and color  - [place] appropriate cooling/warming therapies per order  - Administer medications as ordered  - Instruct and encourage patient and family to use good hand hygiene technique  - Identify and instruct in appropriate isolation precautions for identified infection/condition  Outcome: Progressing  Goal: Absence of fever/infection during neutropenic period  Description: INTERVENTIONS:  - Monitor WBC    Outcome: Progressing     Problem: SAFETY ADULT  Goal: Patient will remain free of falls  Description: INTERVENTIONS:  - Assess patient frequently for physical needs  -  Identify cognitive and physical deficits and behaviors that affect risk of falls    -  [place] fall precautions as indicated by assessment   - Educate patient/family on patient safety including physical limitations  - Instruct patient to call for assistance with activity based on assessment  - Modify environment to reduce risk of injury  - Consider OT/PT consult to assist with strengthening/mobility  Outcome: Progressing  Goal: Maintain or return to baseline ADL function  Description: INTERVENTIONS:  -  Assess patient's ability to carry out ADLs; assess patient's baseline for ADL function and identify physical deficits which impact ability to perform ADLs (bathing, care of mouth/teeth, toileting, grooming, dressing, etc )  - Assess/evaluate cause of self-care deficits   - Assess range of motion  - Assess patient's mobility; develop plan if impaired  - Assess patient's need for assistive devices and provide as appropriate  - Encourage maximum independence but intervene and supervise when necessary  - Involve family in performance of ADLs  - Assess for home care needs following discharge   - Consider OT consult to assist with ADL evaluation and planning for discharge  - Provide patient education as appropriate  Outcome: Progressing  Goal: Maintain or return mobility status to optimal level  Description: INTERVENTIONS:  - Assess patient's baseline mobility status (ambulation, transfers, stairs, etc )    - Identify cognitive and physical deficits and behaviors that affect mobility  - Identify mobility aids required to assist with transfers and/or ambulation (gait belt, sit-to-stand, lift, walker, cane, etc )  - [place] fall precautions as indicated by assessment  - Record patient progress and toleration of activity level on Mobility SBAR; progress patient to next Phase/Stage  - Instruct patient to call for assistance with activity based on assessment  - Consider rehabilitation consult to assist with strengthening/weightbearing, etc   Outcome: Progressing     Problem: CARDIOVASCULAR - ADULT  Goal: Maintains optimal cardiac output and hemodynamic stability  Description: INTERVENTIONS:  - Monitor I/O, vital signs and rhythm  - Monitor for S/S and trends of decreased cardiac output  - Administer and titrate ordered vasoactive medications to optimize hemodynamic stability  - Assess quality of pulses, skin color and temperature  - Assess for signs of decreased coronary artery perfusion  - Instruct patient to report change in severity of symptoms  Outcome: Progressing  Goal: Absence of cardiac dysrhythmias or at baseline rhythm  Description: INTERVENTIONS:  - Continuous cardiac monitoring, vital signs, obtain 12 lead EKG if ordered  - Administer antiarrhythmic and heart rate control medications as ordered  - Monitor electrolytes and administer replacement therapy as ordered  Outcome: Progressing     Problem: SKIN/TISSUE INTEGRITY - ADULT  Goal: Skin integrity remains intact  Description: INTERVENTIONS  - Identify patients at risk for skin breakdown  - Assess and monitor skin integrity  - Assess and monitor nutrition and hydration status  - Monitor labs (i e  albumin)  - Assess for incontinence   - Turn and reposition patient  - Assist with mobility/ambulation  - Relieve pressure over bony prominences  - Avoid friction and shearing  - Provide appropriate hygiene as needed including keeping skin clean and dry  - Evaluate need for skin moisturizer/barrier cream  - Collaborate with interdisciplinary team (i e  Nutrition, Rehabilitation, etc )   - Patient/family teaching  Outcome: Progressing  Goal: Incision(s), wounds(s) or drain site(s) healing without S/S of infection  Description: INTERVENTIONS  - Assess and document risk factors for skin impairment   - Assess and document dressing, incision, wound bed, drain sites and surrounding tissue  - Consider nutrition services referral as needed  - Oral mucous membranes remain intact  - Provide patient/ family education  Outcome: Progressing  Goal: Oral mucous membranes remain intact  Description: INTERVENTIONS  - Assess oral mucosa and hygiene practices  - Implement preventative oral hygiene regimen  - Implement oral medicated treatments as ordered  - Initiate Nutrition services referral as needed  Outcome: Progressing

## 2021-04-01 NOTE — OCCUPATIONAL THERAPY NOTE
Occupational Therapy Evaluation     Patient Name: Guanaco Das  TJVWA'A Date: 2021  Problem List  Principal Problem:    Acute on chronic combined systolic and diastolic congestive heart failure ([facility] )  Active Problems:    Class 3 severe obesity with body mass index (BMI) of 45 0 to 49 9 in adult [facility])    Essential hypertension    Coronary artery disease involving native coronary artery of native heart without angina pectoris    Type 2 diabetes mellitus with stage 4 chronic kidney disease, with long-term current use of insulin ([facility])    Anemia due to stage 4 chronic kidney disease (HCC)    Cellulitis of both lower extremities    Non compliance w medication regimen    Acute kidney injury superimposed on chronic kidney disease ([facility] )    Foot ulcer, left ([place] [address] )    Past Medical History  Past Medical History:   Diagnosis Date    Cardiac disease     CHF (congestive heart failure) ([facility] )     Coronary artery disease     Diabetes mellitus ([place] [address] )     Hyperlipidemia     Hypertension     Renal disorder     TIA (transient ischemic attack)      Past Surgical History  Past Surgical History:   Procedure Laterality Date    CARDIAC SURGERY       SECTION      CORONARY ANGIOPLASTY WITH STENT PLACEMENT      CT NEEDLE BIOPSY KIDNEY  3/25/2021    IR TUNNELED DIALYSIS CATHETER PLACEMENT  3/30/2021      04/01/21 0954   OT Last Visit   OT Visit Date 21   Note Type   Note type Re-Evaluation   Restrictions/Precautions   Weight Bearing Precautions Per Order No   Other Precautions Fall Risk;O2   Pain Assessment   Pain Assessment Tool 0-10   Pain Score 2   Pain Location/Orientation Orientation: Left; Location: Foot   Home Living   Type of [address] [place] [address] One level   Bathroom Shower/Tub Tub/shower unit  (Pt sponge bathes at baseline)    [address] transfer bench   [address] Walker;Cane   Additional Comments Pt reports that she plans to stay with friend who lives in a house with 1st floor set-up and ramp access upon D/C   Prior Function   Lives With Daughter   Receives Help From Family   ADL Assistance Needs assistance  (needs assist with some LB ADLs)   IADLs Needs assistance   Falls in the last 6 months 0   Comments Pt reports that she plans to stay with friend who lives in a house with 1st floor set-up and ramp access  Psychosocial   Psychosocial (WDL) WDL   ADL   Eating Assistance 7  Independent   Grooming Assistance 7  Independent   UB Bathing Assistance 6  Modified Independent   LB Bathing Assistance 4  Minimal Assistance   UB Dressing Assistance 6  Modified independent   LB Dressing Assistance 3  Moderate Assistance   Toileting Assistance  6  Modified independent   Bed Mobility   Additional Comments Pt received seated in recliner by end of session   Transfers   Sit to Stand 6  Modified independent   Additional items Verbal cues   Stand to Sit 6  Modified independent   Additional items Verbal cues   Functional Mobility   Functional Mobility 6  Modified independent   Additional Comments RW   Balance   Static Sitting Normal   Dynamic Sitting Normal   Static Standing Good   Dynamic Standing Good   Activity Tolerance   Activity Tolerance Patient limited by fatigue   Medical Staff Made Aware PT [address] XIOMY Perez Assessment   RUE Assessment WFL   LUE Assessment   LUE Assessment WFL   Cognition   Overall Cognitive Status WFL   Arousal/Participation Alert   Attention Within functional limits   Orientation Level Oriented X4   Memory Within functional limits   Following Commands Follows all commands and directions without difficulty   Assessment   Assessment Pt is a 60 y o  female seen for OT re-evaluation at [facility], admitted 3/15/2021 w/ Acute on chronic combined systolic and diastolic congestive heart failure ([facility] )  Pt with prolonged hospital course  Now undergoing dialysis   Comorbidities affecting pt's functional performance at time of assessment include: hyperglycemia, CKD, left foot cellulitis, class 3 obesity, essential HTN, etc (see chart for additional hx)   Ibis Hernandez Personal factors affecting pt at time of IE include:difficulty performing ADLS, difficulty performing IADLS  and decreased functional mobility  Prior to admission, pt was living with daughter in house with 1st floor set-up  Pt required occasional assist for LB ADLs  Required use of cane PTA  Upon evaluation: Pt performs mod I for transfers/mobility with RW, supervision for UB ADLs and min-mod A for LB ADLS 2* the following deficits impacting occupational performance: decreased strength, decreased balance and decreased tolerance  Pt to benefit from continued skilled OT tx while in the hospital to address deficits as defined above and maximize level of functional independence w ADL's and functional mobility  Occupational Performance areas to address include: bathing/shower, toilet hygiene, dressing, functional mobility and community mobility  Based on findings, pt is of high complexity  The patient's raw score on the AM-PAC Daily Activity inpatient short form is 21, standardized score is 44 27, greater than 39 4  Patients at this level are likely to benefit from DC to home  Please refer to the recommendation of the Occupational Therapist for safe DC planning  At this time, OT recommendations at time of discharge are home with family support  Pt reports that she plans to stay with friend who lives in a house with 1st floor set-up and ramp access  Goals   Patient Goals Pt wishes to get home   Plan   OT Frequency Eval only  (D/C OT)   Recommendation   OT Discharge Recommendation Return to previous environment with social support   Equipment Recommended   (Reports no need for commode   Owns a tub bench  )   AM-PAC Daily Activity Inpatient   Lower Body Dressing 2   Bathing 3   Toileting 4   Upper Body Dressing 4   Grooming 4   Eating 4   Daily Activity Raw Score 21   Daily Activity Standardized Score (Calc for Raw Score >=11) 44 27   AM-PAC Applied Cognition Inpatient   Following a Speech/Presentation 4   Understanding Ordinary Conversation 4   Taking Medications 4   Remembering Where Things Are Placed or Put Away 4   Remembering List of 4-5 Errands 4   Taking Care of Complicated Tasks 3   Applied Cognition Raw Score 23   Applied Cognition Standardized Score 53 08         Aspen Kapadia OTR/L

## 2021-04-01 NOTE — CASE MANAGEMENT
LOS: 17 days  Continuing to follow patient  Received call from Christiano Helton at [facility]  She requests a CXR be faxed to [phone]  CXR faxed to same  Christiano Helton reports that patient will have a chair at [facility] and she will give me days and times when they receive the CXR  Patient will need a COVID prior to discharge

## 2021-04-01 NOTE — PROGRESS NOTES
NEPHROLOGY PROGRESS NOTE   Nishi Brar 60 y o  female MRN: [id number]  Unit/Bed#: -01 Encounter: [id number]  Reason for Consult: ADI (POA) on CKD IV, vs underlying progression of disease    PLAN:   -ADI on CKD 4, initiated on dialysis due to uremia, status post renal biopsy which showed diabetic glomerular sclerosis, patient now deemed ESRD  -for 3rd dialysis treatment today   -case management assisting with outpatient dialysis unit placement  Hepatitis panel completed  Will need COVID test prior to discharge   -anemia, with Hgb of 6 6, refusing PRBC transfusion  Receiving IV Venofer, started on Epogen  ASSESSMENT/PLAN:  Acute kidney injury (POA) on CKD stage 4, versus underlying progression of chronic disease:  Suspected prerenal secondary to volume depletion in the setting of diarrhea and hypotension as well as ATN with cellulitis  -presents with creatinine of 3 23   -baseline creatinine previously low 2's in 2020    -previously received IV albumin for hypotension/3rd spacing and IV Bumex drip, discontinued 3/30    -repeat UA shows protein, 0-1 RBCs, 2-4 hyaline casts, 5-10 granular cast   -bladder scan unremarkable, urine eosinophils 0, UCPR: 4 57 g  -BHUMI, hepatitis panel, complements, rapid HIV, RPR, RF, ASO, anti-DNA double stranded, GBM, cryo, ANCA level, antiPLA 2 R: negative    -imaging unremarkable for hydronephrosis  -S/P renal biopsy 03/25 which showed diabetic glomerular sclerosis, with severe tubular atrophy, interstitial fibrosis, severe arterial sclerosis, likely progressed to ESRD  -initiated on dialysis 3/30  For 3 rd dialysis treatment today   -avoid nephrotoxins, hypotension, IV contrast   -hepatitis panel completed    -case management assisting with outpatient dialysis placement  Patient requesting [facility] in [address] [place] [address]  WIll need COIVD test prior to discharge       Access: Tunneled dialysis catheter  Site intact   Placed 03/30/21       Proteinuria:  -previous UPCR 3 g in 2018, recent UPCR 4 57    -SPEP negative for monoclonal gammopathy   -not on Ace/Arb   -status post renal biopsy, please see above for results        Hypertension: initially hypotensive,  blood pressure now stable between 120s-150s  -avoid hypotension or high fluctuations in blood pressure   -outpatient medications:  Coreg 25 mg 2 times per day, hydralazine 75 mg 2 times per day, amlodipine 2 5 mg daily, torsemide 40 mg daily   -continues on Coreg 25 mg b i d   -hydralazine discontinued    -holding parameters adjusted on antihypertensive for systolic blood pressure less than 870       Diastolic heart failure:  Presenting with shortness of breath and lower extremity edema   Reports stop taking torsemide approximately 1 week ago   Most recent echo showed EF of 45-50% with grade 2 diastolic dysfunction, mild-to-moderate AS, dilated IVC  -chest x-ray negative for acute cardiopulmonary disease   -continue daily weights, I/O, fluid restriction  -outpatient diuretic:  Torsemide 40 mg daily  -previous bleed on Bumex drip  Discontinued 3/30  -initiated on dialysis 3/30  Will continue with UF on HD      Bilateral lower extremity edema:  Suspect 3rd spacing   -lower extremity Doppler negative for DVT  -continue to elevate legs throughout the day  -recommend low-salt diet   -received IV albumin      Anemia:  Hemoglobin dropping to 6 6   -continue to monitor and transfuse as needed for hemoglobin less than 7 0   -patient refusing blood transfusion   -iron panel showed iron sat 14%  -receiving IV venofer    -started on Epogen   -work up negative for monoclonal gammopathy    -checking occult stools       Lower extremity cellulitis: (resolved)  -completed antibiotics      MBD in CKD:  -hyperphosphatemia, continues on PhosLo with meals   Most recent phosphorus 4 6   -PTH level pending   -Will continue to monitor       Hypernatremia:  Resolved    -continue to monitor and trend      Other: Diabetes, obesity, CAD     Disposition:  Okay to discharge from Renal once medically cleared and outpatient dialysis unit is established  SUBJECTIVE:  The patient is sitting in her chair  She states she is not sleeping well  She denies chest pain or increased SOB  She states she has dry heeves  She is eating well  She is urinating less       OBJECTIVE:  Current Weight: Weight - Scale: 127 kg (279 lb 6 4 oz)  Vitals:    03/31/21 1716 03/31/21 2300 04/01/21 0600 04/01/21 0714   BP:  137/63  130/60   BP Location:  Right arm  Right arm   Pulse:  65  82   Resp:  16  18   Temp:  98 7 °F (37 1 °C)  98 4 °F (36 9 °C)   TempSrc:  Oral  Oral   SpO2:  94%     Weight: 129 kg (284 lb 6 3 oz)  127 kg (279 lb 6 4 oz)    Height: 5' 4" (1 626 m)          Intake/Output Summary (Last 24 hours) at 4/1/2021 0946  Last data filed at 4/1/2021 0501  Gross per 24 hour   Intake 920 ml   Output --   Net 920 ml     General: NAD  Skin: warm, dry, intact, no rash  HEENT: Moist mucous membranes, sclera anicteric, normocephalic, atraumatic  Neck: No apparent JVD appreciated  Chest: lung sounds clear B/L, on )2   CVS:Regular rate and rhythm, no murmer   Abdomen: Soft, round, non-tender, +BS, obese  Extremities: No B/L LE edema present  Neuro: alert and oriented  Psych: appropriate mood and affect     Medications:    Current Facility-Administered Medications:     acetaminophen (TYLENOL) tablet 650 mg, 650 mg, Oral, Q6H PRN, Sarah Sanchez PA-C, 650 mg at 03/17/21 2320    aspirin (ECOTRIN LOW STRENGTH) EC tablet 81 mg, 81 mg, Oral, Daily, Fito Castaneda MD    atorvastatin (LIPITOR) tablet 40 mg, 40 mg, Oral, Daily, Sarah Sanchez PA-C, 40 mg at 04/01/21 1076    calcium acetate (PHOSLO) capsule 667 mg, 667 mg, Oral, TID With Meals, HOWARD Crook, 667 mg at 04/01/21 9451    carvedilol (COREG) tablet 25 mg, 25 mg, Oral, BID, HOWARD Landeros, 25 mg at 04/01/21 7785    clopidogrel (PLAVIX) tablet 75 mg, 75 mg, Oral, Daily, Fito Castaneda MD    dextrose 50 % IV solution 25 mL, 25 mL, Intravenous, Once, Rubia Taylor,     diphenhydrAMINE (BENADRYL) tablet 25 mg, 25 mg, Oral, Q6H PRN, Sarah Sanchez PA-C, 25 mg at 04/01/21 0353    epoetin cedric (EPOGEN,PROCRIT) injection 10,000 Units, 10,000 Units, Intravenous, After Dialysis, Amaris Espinoza MD    heparin (porcine) subcutaneous injection 5,000 Units, 5,000 Units, Subcutaneous, Q8H [facility], Noel Cornejo MD, Stopped at 04/01/21 0607    insulin glargine (LANTUS) subcutaneous injection 25 Units 0 25 mL, 25 Units, Subcutaneous, QAM, Noel Cornejo MD, 25 Units at 04/01/21 0823    insulin lispro (HumaLOG) 100 units/mL subcutaneous injection 1-6 Units, 1-6 Units, Subcutaneous, TID AC, 1 Units at 03/31/21 0832 **AND** Fingerstick Glucose (POCT), , , TID AC, Sarah Sanchez PA-C    insulin lispro (HumaLOG) 100 units/mL subcutaneous injection 1-6 Units, 1-6 Units, Subcutaneous, HS, Sarah Sanchez PA-C, 2 Units at 03/30/21 2209    insulin lispro (HumaLOG) 100 units/mL subcutaneous injection 3 Units, 3 Units, Subcutaneous, TID With Meals, Noel Cornejo MD, 3 Units at 04/01/21 0824    iron sucrose (VENOFER) 100 mg in sodium chloride 0 9 % 100 mL IVPB, 100 mg, Intravenous, Once, Arnol Herzog MD    ondansetron [facility]) injection 4 mg, 4 mg, Intravenous, Q6H PRN, Arnol Herzog MD, 4 mg at 03/25/21 1221    oxyCODONE (ROXICODONE) IR tablet 5 mg, 5 mg, Oral, Q6H PRN, Arnol Herzog MD, 5 mg at 03/30/21 1722    Laboratory Results:  Results from last 7 days   Lab Units 04/01/21  0626 03/31/21  1632 03/30/21 2009 03/30/21  0628  03/28/21  0556  03/26/21  0601   WBC Thousand/uL 7 99  --   --  7 34  --  10 57*   < > 13 83*   HEMOGLOBIN g/dL 6 6*  --  6 6* 6 3*  --  7 2*   < > 6 7*   HEMATOCRIT % 23 2*  --  22 2* 21 9*  --  24 8*   < > 23 0*   PLATELETS Thousands/uL 105*  --   --  170  --  185   < > 201   SODIUM mmol/L 142 145  --  145   < > 145   < > 143   POTASSIUM mmol/L 4 5 4 8  --  5 2   < > 5 2   < > 4 8   CHLORIDE mmol/L 104 107  -- 107   < > 107   < > 108   CO2 mmol/L 23 28  --  26   < > 26   < > 23   BUN mg/dL 85* 94*  --  107*   < > 89*   < > 76*   CREATININE mg/dL 5 65* 5 75*  --  6 16*   < > 5 49*   < > 4 31*   CALCIUM mg/dL 8 3 8 0*  --  7 8*   < > 7 9*   < > 8 0*   MAGNESIUM mg/dL  --  2 5  --   --   --   --   --   --    PHOSPHORUS mg/dL 4 6* 4 8*  --   --   --   --   --  4 8*    < > = values in this interval not displayed

## 2021-04-01 NOTE — ASSESSMENT & PLAN NOTE
Patient has normocytic anemia  Iron studies revealed anemia of chronic disease  Patient denies any signs of bleeding  Patient declined blood transfusion earlier this this week when hemoglobin was 6 7  Hemoglobin today is 6 6  Discussed with patient again and she declined blood transfusion and wants to monitor the H&H  No active or overt rectal bleeding  Patient is receiving Venofer  She has no abdominal tenderness and denies abdominal pain    Patient is on subcutaneous heparin for DVT prevention purposes

## 2021-04-01 NOTE — PLAN OF CARE
HD tx plan: 3 hrs, removing 1-1 5L as josh per physician order  3K bath for K 4 5 4/1  Post-Dialysis RN Treatment Note    Blood Pressure:  Pre 113/77 mm/Hg  Post 138/54 mmHg   EDW  tbd    Weight:  Pre 127 3 kg   Post 126 6 kg   Mode of weight measurement: Standing Scale   Volume Removed  500 ml    Treatment duration 180 minutes    NS given  No    Treatment shortened? No   Medications given during Rx Epogen [id number]  Venofer 100 mg was given this am by primary nurse   Estimated Kt/V  None Reported   Access type: Permacath/TDC   Access Status: No    Report called to primary nurse   Yes   Pt c/o throbbing/pounding in head and feeling "woozy" approx 35 mins into dialysis  BP dropped from pre hd but remained sbp >100 throughout episode, lowest BP was 101/60  UF goal lowered then uf off as josh per Dr Rishi Long  BP improved and pt stated she was feeling better around 25 minutes after start of episode  UF goal increased to remove 0 5L as josh  Pt stable post reinfusion      Problem: METABOLIC, FLUID AND ELECTROLYTES - ADULT  Goal: Electrolytes maintained within normal limits  Description: INTERVENTIONS:  - Monitor labs and assess patient for signs and symptoms of electrolyte imbalances  - Administer electrolyte replacement as ordered  - Monitor response to electrolyte replacements, including repeat lab results as appropriate  - Instruct patient on fluid and nutrition as appropriate  Outcome: Progressing     Problem: METABOLIC, FLUID AND ELECTROLYTES - ADULT  Goal: Fluid balance maintained  Description: INTERVENTIONS:  - Monitor labs   - Monitor I/O and WT  - Instruct patient on fluid and nutrition as appropriate  - Assess for signs & symptoms of volume excess or deficit  Outcome: Progressing

## 2021-04-01 NOTE — PLAN OF CARE
Problem: PHYSICAL THERAPY ADULT  Goal: Performs mobility at highest level of function for planned discharge setting  See evaluation for individualized goals  Description: Treatment/Interventions: ADL retraining, Functional transfer training, LE strengthening/ROM, Therapeutic exercise, Endurance training, Gait training, Equipment eval/education, Spoke to nursing, Spoke to case management, OT  Equipment Recommended: Merlin Sovereign       See flowsheet documentation for full assessment, interventions and recommendations  Outcome: Adequate for Discharge  Note: Prognosis: Good  Problem List: Decreased endurance, Impaired balance, Obesity, Pain, Decreased skin integrity  Assessment: Patient for re-evalulation  Now receiving dialysis and a catheter was placed by IR  Patient was received OOB in chair and agreeable to session  She performed all mobility independently  Reports that she is at her baseline  She plans to [facility] to her [facility] home where there is a ramped entrance  She will stay on the first floor with recliner chair  Refuses home P T  services  Reports that she has been ambulating in room  Patient has no further inpatient P T  needs at this time  Will discharge orders  The patient's AM-PAC Basic Mobility Inpatient Short Form Raw Score is 21, Standardized Score is 45 55  A standardized score greater than 42 9 suggests the patient may benefit from discharge to home  Please also refer to the recommendation of the Physical Therapist for safe discharge planning  Barriers to Discharge: None  Barriers to Discharge Comments: Plans to go to [facility] home at discharge (no steps)  PT Discharge Recommendation: Return to previous environment with no needs     PT - OK to Discharge: Yes    See flowsheet documentation for full assessment

## 2021-04-01 NOTE — PHYSICAL THERAPY NOTE
PHYSICAL THERAPY RE-EVAL       04/01/21 0955   PT Last Visit   PT Visit Date 04/01/21   Note Type   Note type Re-Evaluation   Pain Assessment   Pain Assessment Tool 0-10   Pain Score 2   Pain Location/Orientation Orientation: Left  (heel)   Home Living   Additional Comments Refer to eval  Patient will be staying at [facility] discharge where there is a ramped entrance  0 steps inside  Will sleep in a recliner  Prior Function   Comments Refer to prior eval   Restrictions/Precautions   Weight Bearing Precautions Per Order No   Other Precautions O2;Pain   General   Additional Pertinent History Currently on 4 5L  O2 desat to 87% with amb  Recovered after 1-2 minute rest braek   Family/Caregiver Present No   Cognition   Overall Cognitive Status WFL   Arousal/Participation Alert   Attention Within functional limits   Orientation Level Oriented X4   Following Commands Follows all commands and directions without difficulty   Bed Mobility   Additional Comments Received OOB in chair   Transfers   Sit to Stand 6  Modified independent   Additional items Armrests   Stand to Sit 6  Modified independent   Additional items Armrests   Ambulation/Elevation   Gait pattern Decreased foot clearance; Wide JARAD; Antalgic   Gait Assistance 6  Modified independent   Assistive Device Rolling walker   Distance 20ft   Stair Management Assistance Not tested   Balance   Static Sitting Normal   Dynamic Sitting Normal   Static Standing Good   Dynamic Standing Good   Ambulatory Good   Endurance Deficit   Endurance Deficit Yes   Activity Tolerance   Activity Tolerance Patient limited by fatigue;Patient limited by pain   Medical Staff Made Aware OT, [facility]   Nurse Made Aware XIOMY Hurst  ([facility])   Assessment   Prognosis Good   Problem List Decreased endurance; Impaired balance;Obesity;Pain;Decreased skin integrity   Assessment Patient for re-evalulation   Now receiving dialysis and a catheter was placed by IR  Patient was received OOB in chair and agreeable to session  She performed all mobility independently  Reports that she is at her baseline  She plans to go to her friends home where there is a ramped entrance  She will stay on the first floor with recliner chair  Refuses home P T  services  Reports that she has been ambulating in room  Patient has no further inpatient P T  needs at this time  Will discharge orders  The patient's AM-PAC Basic Mobility Inpatient Short Form Raw Score is 21, Standardized Score is 45 55  A standardized score greater than 42 9 suggests the patient may benefit from discharge to home  Please also refer to the recommendation of the Physical Therapist for safe discharge planning  Barriers to Discharge None   Barriers to Discharge Comments Plans to go to [facility] home at discharge (no steps)   Goals   Patient Goals To get out of the hospital   Plan   Treatment/Interventions   (D/C P T )   Recommendation   PT Discharge Recommendation Return to previous environment with no needs   Equipment Recommended   (owns RW)   PT - OK to Discharge Yes   Additional Comments To [facility] home when medically stable   AM-PAC Basic Mobility Inpatient   Turning in Bed Without Bedrails 3   Lying on Back to Sitting on Edge of Flat Bed 3   Moving Bed to Chair 4   Standing Up From Chair 4   Walk in Room 4   Climb 3-5 Stairs 3   Basic Mobility Inpatient Raw Score 21   Basic Mobility Standardized Score 45 55   Nyla Milian, PT            Patient Name: Ani Rouse  QUCMI'P Date: 4/1/2021

## 2021-04-02 ENCOUNTER — APPOINTMENT (INPATIENT)
Dept: RADIOLOGY | Facility: HOSPITAL | Age: 66
DRG: 698 | End: 2021-04-02
Payer: COMMERCIAL

## 2021-04-02 LAB
ANION GAP SERPL CALCULATED.3IONS-SCNC: 13 MMOL/L (ref 4–13)
BASOPHILS # BLD AUTO: 0.04 THOUSANDS/ΜL (ref 0–0.1)
BASOPHILS NFR BLD AUTO: 0 % (ref 0–1)
BUN SERPL-MCNC: 59 MG/DL (ref 5–25)
CALCIUM SERPL-MCNC: 8.1 MG/DL (ref 8.3–10.1)
CHLORIDE SERPL-SCNC: 105 MMOL/L (ref 100–108)
CO2 SERPL-SCNC: 23 MMOL/L (ref 21–32)
CREAT SERPL-MCNC: 4.31 MG/DL (ref 0.6–1.3)
EOSINOPHIL # BLD AUTO: 1.09 THOUSAND/ΜL (ref 0–0.61)
EOSINOPHIL NFR BLD AUTO: 11 % (ref 0–6)
ERYTHROCYTE [DISTWIDTH] IN BLOOD BY AUTOMATED COUNT: 17.7 % (ref 11.6–15.1)
FLUAV RNA RESP QL NAA+PROBE: NEGATIVE
FLUBV RNA RESP QL NAA+PROBE: NEGATIVE
GFR SERPL CREATININE-BSD FRML MDRD: 10 ML/MIN/1.73SQ M
GLUCOSE SERPL-MCNC: 121 MG/DL (ref 65–140)
GLUCOSE SERPL-MCNC: 136 MG/DL (ref 65–140)
GLUCOSE SERPL-MCNC: 160 MG/DL (ref 65–140)
GLUCOSE SERPL-MCNC: 165 MG/DL (ref 65–140)
GLUCOSE SERPL-MCNC: 193 MG/DL (ref 65–140)
HCT VFR BLD AUTO: 23.3 % (ref 34.8–46.1)
HGB BLD-MCNC: 6.8 G/DL (ref 11.5–15.4)
IMM GRANULOCYTES # BLD AUTO: 0.06 THOUSAND/UL (ref 0–0.2)
IMM GRANULOCYTES NFR BLD AUTO: 1 % (ref 0–2)
LYMPHOCYTES # BLD AUTO: 0.82 THOUSANDS/ΜL (ref 0.6–4.47)
LYMPHOCYTES NFR BLD AUTO: 9 % (ref 14–44)
MCH RBC QN AUTO: 29.2 PG (ref 26.8–34.3)
MCHC RBC AUTO-ENTMCNC: 29.2 G/DL (ref 31.4–37.4)
MCV RBC AUTO: 100 FL (ref 82–98)
MONOCYTES # BLD AUTO: 0.72 THOUSAND/ΜL (ref 0.17–1.22)
MONOCYTES NFR BLD AUTO: 8 % (ref 4–12)
NEUTROPHILS # BLD AUTO: 6.93 THOUSANDS/ΜL (ref 1.85–7.62)
NEUTS SEG NFR BLD AUTO: 71 % (ref 43–75)
NRBC BLD AUTO-RTO: 0 /100 WBCS
PLATELET # BLD AUTO: 87 THOUSANDS/UL (ref 149–390)
PMV BLD AUTO: 10.9 FL (ref 8.9–12.7)
POTASSIUM SERPL-SCNC: 4.6 MMOL/L (ref 3.5–5.3)
RBC # BLD AUTO: 2.33 MILLION/UL (ref 3.81–5.12)
RSV RNA RESP QL NAA+PROBE: NEGATIVE
SARS-COV-2 RNA RESP QL NAA+PROBE: NEGATIVE
SODIUM SERPL-SCNC: 141 MMOL/L (ref 136–145)
WBC # BLD AUTO: 9.66 THOUSAND/UL (ref 4.31–10.16)

## 2021-04-02 PROCEDURE — 99232 SBSQ HOSP IP/OBS MODERATE 35: CPT | Performed by: INTERNAL MEDICINE

## 2021-04-02 PROCEDURE — 71045 X-RAY EXAM CHEST 1 VIEW: CPT

## 2021-04-02 PROCEDURE — 0241U HB NFCT DS VIR RESP RNA 4 TRGT: CPT | Performed by: INTERNAL MEDICINE

## 2021-04-02 PROCEDURE — 85025 COMPLETE CBC W/AUTO DIFF WBC: CPT | Performed by: INTERNAL MEDICINE

## 2021-04-02 PROCEDURE — 82948 REAGENT STRIP/BLOOD GLUCOSE: CPT

## 2021-04-02 PROCEDURE — 80048 BASIC METABOLIC PNL TOTAL CA: CPT | Performed by: INTERNAL MEDICINE

## 2021-04-02 RX ADMIN — CALCIUM ACETATE 667 MG: 667 CAPSULE ORAL at 17:34

## 2021-04-02 RX ADMIN — INSULIN LISPRO 3 UNITS: 100 INJECTION, SOLUTION INTRAVENOUS; SUBCUTANEOUS at 13:10

## 2021-04-02 RX ADMIN — INSULIN LISPRO 1 UNITS: 100 INJECTION, SOLUTION INTRAVENOUS; SUBCUTANEOUS at 17:34

## 2021-04-02 RX ADMIN — INSULIN GLARGINE 25 UNITS: 100 INJECTION, SOLUTION SUBCUTANEOUS at 08:47

## 2021-04-02 RX ADMIN — ATORVASTATIN CALCIUM 40 MG: 40 TABLET, FILM COATED ORAL at 08:47

## 2021-04-02 RX ADMIN — INSULIN LISPRO 1 UNITS: 100 INJECTION, SOLUTION INTRAVENOUS; SUBCUTANEOUS at 13:10

## 2021-04-02 RX ADMIN — CARVEDILOL 12.5 MG: 12.5 TABLET, FILM COATED ORAL at 17:34

## 2021-04-02 RX ADMIN — HEPARIN SODIUM 5000 UNITS: 5000 INJECTION INTRAVENOUS; SUBCUTANEOUS at 13:13

## 2021-04-02 RX ADMIN — INSULIN LISPRO 3 UNITS: 100 INJECTION, SOLUTION INTRAVENOUS; SUBCUTANEOUS at 17:34

## 2021-04-02 RX ADMIN — ASPIRIN 81 MG: 81 TABLET, COATED ORAL at 08:47

## 2021-04-02 RX ADMIN — CALCIUM ACETATE 667 MG: 667 CAPSULE ORAL at 08:46

## 2021-04-02 RX ADMIN — INSULIN LISPRO 2 UNITS: 100 INJECTION, SOLUTION INTRAVENOUS; SUBCUTANEOUS at 22:35

## 2021-04-02 RX ADMIN — CALCIUM ACETATE 667 MG: 667 CAPSULE ORAL at 13:10

## 2021-04-02 RX ADMIN — HEPARIN SODIUM 5000 UNITS: 5000 INJECTION INTRAVENOUS; SUBCUTANEOUS at 22:35

## 2021-04-02 RX ADMIN — HEPARIN SODIUM 5000 UNITS: 5000 INJECTION INTRAVENOUS; SUBCUTANEOUS at 05:37

## 2021-04-02 RX ADMIN — OXYCODONE HYDROCHLORIDE 5 MG: 5 TABLET ORAL at 05:42

## 2021-04-02 RX ADMIN — CLOPIDOGREL BISULFATE 75 MG: 75 TABLET ORAL at 08:47

## 2021-04-02 RX ADMIN — CARVEDILOL 12.5 MG: 12.5 TABLET, FILM COATED ORAL at 08:47

## 2021-04-02 NOTE — DISCHARGE INSTRUCTIONS
Wound Care  · Wash legs soap and water  · Maxorb Ag, ABD's(x3 Right, x2 Left), Kerlix, and Ace wrap(x1 - 4", x1 - 6" per leg) to be done every other day  · Ace wraps from forefoot to knee bilateral fairly tight  · DO NOT allow patient to sit/sleep with legs dependent !!!!!!!!!!!!!  · Schedule follow-up for wound care on a Wednesday with Dr Bill Ames Placement   WHAT YOU NEED TO KNOW:   A perma-cath is a catheter placed through a vein into or near your right atrium  Your right atrium is the right upper chamber of your heart  A perma-cath is used for dialysis in an emergency or until a long-term device is ready to use  After your procedure, you will have some pain and swelling on your chest and neck  You may have some bruises on your chest and neck  You may also have 2 dressings, one on your chest and one on your neck  DISCHARGE INSTRUCTIONS:   Call 911 for any of the following:   · You feel lightheaded, short of breath, and have chest pain  · Your catheter comes out   Contact Interventional Radiology at [phone] [facility] PATIENTS: Contact Interventional Radiology at [phone]) [facility] PATIENTS: Contact Interventional Radiology at [phone]) if:  · Blood soaks through your bandage  · You have new swelling in your arm, neck, face, or chest on your right side  · Your catheter gets wet  · Your bruises or pain get worse  · You have a fever or chills  · Persistent nausea or vomiting  · Your incision is red, swollen, or draining pus  · You have questions or concerns about your condition or care  Self-care:       · Resume your normal diet  · Keep your dressings dry  Do not take a shower or swim  You may take a tub bath, but do not get your dressings wet  Water in your wound can cause bacteria to grow and cause an infection  If your dressing gets wet, dry it off and cover it with dry sterile gauze  Call your healthcare provider  Do not use soaps or ointments    · Do not change your dressings  Your healthcare provider or dialysis nurse will change your dressings  Your dressings should stay in place until your healthcare provider removes them  The dressing on your chest will stay as long as you have the catheter in place  The dressing prevents infection  · Do not remove the red and blue caps from the end of your catheter  The caps prevent air from getting into your catheter  Follow up with your healthcare provider as directed: Write down your questions so you remember to ask them during your visits             POST BIOPSY    Care after your procedure:    1  Limit your activities for 24 hours after your biopsy  2  No driving day of biopsy  3  Return to your normal diet  Small sips of flat soda will help with mild nausea  4  Remove band-aid or dressing 24 hours after procedure  Contact Interventional Radiology at [phone] [place] PATIENTS: Contact Interventional Radiology at [phone]) [facility] PATIENTS: Contact Interventional Radiology at [phone]) if:    1  Difficulty breathing, nausea or vomiting  2  Chills or fever above 101 degrees F      3  Pain at biopsy site not relieved by medication  4  Develop any redness, swelling, heat, unusual drainage, heavy bruising or bleeding from biopsy site

## 2021-04-02 NOTE — ASSESSMENT & PLAN NOTE
Patient has normocytic anemia  Iron studies revealed anemia of chronic disease  Patient denies any signs of bleeding  Patient declined blood transfusion earlier this this week when hemoglobin was 6 7  Hemoglobin today is 6 8  Discussed with patient again and she declined blood transfusion and wants to monitor the H&H  No active or overt rectal bleeding  Patient received Venofer  She has no abdominal tenderness and denies abdominal pain    Patient is on subcutaneous heparin for DVT prevention purposes

## 2021-04-02 NOTE — ASSESSMENT & PLAN NOTE
Lab Results   Component Value Date    HGBA1C 7 6 (H) 03/15/2021       Recent Labs     04/01/21  0730 04/01/21  1153 04/01/21  1602 04/01/21 2126   POCGLU 116 90 72 217*       Blood Sugar Average: Last 72 hrs:  · (P) 578 [id number]     Patient has type 2 diabetes on insulin with stage IV chronic disease  Continue Humalog 3 units before meals, and Lantus 25 units subcu daily

## 2021-04-02 NOTE — PROGRESS NOTES
[place]  Progress Note - Qi Kennedy 1955, 60 y o  female MRN: [id number]  Unit/Bed#: -01 Encounter: [id number]  Primary Care Provider: Kacy Abebe DO   Date and time admitted to hospital: 3/15/2021  7:09 PM    Foot ulcer, left ([address] )  Assessment & Plan  · Patient with ulcer on left heel of the foot  · Patient is unaware when ulcer started but notes the rash on her lower extremities began about 6 weeks ago  · Patient reports increased pain with touch and walking over the past week  · Patient with poor compliance of diabetes insulin  · Patient completed the course of cefepime  · XR left foot completed on 03/15 with calcaneal spurring, no acute osseous abnormality  · Podiatry consult and recommendations appreciated  · Wound care and dressing changes per Podiatry recommendations    Acute kidney injury superimposed on chronic kidney disease [facility])  Assessment & Plan  Lab Results   Component Value Date    EGFR 10 04/02/2021    EGFR 7 04/01/2021    EGFR 7 03/31/2021    CREATININE 4 31 (H) 04/02/2021    CREATININE 5 65 (H) 04/01/2021    CREATININE 5 75 (H) 03/31/2021     Patient has stage IV chronic disease with baseline creatinine between 1 49-2  15  She presented with acute kidney injury  She has nephrotic range proteinuria  Nephrology was consulted  Patient underwent renal biopsy on March 25th  She denies any abdominal pain or signs of bleeding  Patient creatinine was 6 16 on 03/30  Creatinine this morning is 4 31  Patient was on Bumex drip which was discontinued by Nephrology  Renal biopsy results showed severe nodular diabetic glomerular sclerosis with severe tubular atrophy and interstitial fibrosis with severe arterial sclerosis and atherosclerosis with hyalinosis    Patient had dialysis catheter placed by IR and underwent dialysis yesterday on 03/30/2021 and 03/31/2021 and 4/01/2021  Patient is set up for dialysis as outpatient with April Borrow on Tuesday, Thursday and Saturday  COVID test is ordered  Patient will be getting dialyzed on Saturday while inpatient  On 3 L of supplemental oxygen  Wean as tolerated  Non compliance w medication regimen  Assessment & Plan  · Patient reports she stopped taking her insulin around Thanksgiving 2020, her torsemide about 1 week ago and has not been seeing wound care for her legs,   · Patient had been seeing wound care for wounds of her bilateral lower extremities but stopped going due to the fear of jennifer Covid-19  · Consult case management    Cellulitis of both lower extremities  Assessment & Plan  Patient admitted with bilateral lower extremity cellulitis and was treated with IV cefepime and vancomycin  Cellulitis resolved  Anemia due to stage 4 chronic kidney disease [facility])  Assessment & Plan  Patient has normocytic anemia  Iron studies revealed anemia of chronic disease  Patient denies any signs of bleeding  Patient declined blood transfusion earlier this this week when hemoglobin was 6 7  Hemoglobin today is 6 8  Discussed with patient again and she declined blood transfusion and wants to monitor the H&H  No active or overt rectal bleeding  Patient received Venofer  She has no abdominal tenderness and denies abdominal pain  Patient is on subcutaneous heparin for DVT prevention purposes    Type 2 diabetes mellitus with stage 4 chronic kidney disease, with long-term current use of insulin [facility])  Assessment & Plan  Lab Results   Component Value Date    HGBA1C 7 6 (H) 03/15/2021       Recent Labs     04/01/21  0730 04/01/21  1153 04/01/21  1602 04/01/21  2126   POCGLU 116 90 72 217*       Blood Sugar Average: Last 72 hrs:  · (P) 028 [id number]     Patient has type 2 diabetes on insulin with stage IV chronic disease  Continue Humalog 3 units before meals, and Lantus 25 units subcu daily       Coronary artery disease involving native coronary artery of native heart without angina pectoris  Assessment & Plan  Patient has coronary disease and she status post stent placements in 2018  She denies any chest pain or shortness of breath currently  Continue on aspirin and Plavix  Continue atorvastatin    Essential hypertension  Assessment & Plan  · Continue home carvedilol 25 mg b i d , hydralazine 75 mg b i d   · Continue to monitor blood pressure per unit protocol    Class 3 severe obesity with body mass index (BMI) of 45 0 to 49 9 in adult [facility])  Assessment & Plan  Body mass index is 47 65 kg/m²  · Encourage lifestyle changes  · Consult nutrition    * Acute on chronic combined systolic and diastolic congestive heart failure (HCC)  Assessment & Plan  Wt Readings from Last 3 Encounters:   04/02/21 126 kg (277 lb 9 6 oz)   02/21/20 117 kg (258 lb)   01/07/20 117 kg (258 lb 12 8 oz)     Patient was admitted with increased lower extremity edema due to acute systolic CHF due to noncompliance with medications  Ejection fraction was 45-50% on this admission  Patient was treated with IV Bumex then IV diuretics were held because of worsening renal function then restarted on 3/26/2021 due to increasing lower extremity edema and oxygen requirement   Lungs are clear to auscultation but patient has worsening lower extremity edema  Patient was switched to Bumex drip on 03/29  Bumex drip was discontinued on 03/30 Daily weight and I&Os  Continue on dialysis per Nephrology  Patient underwent dialysis for past 3 days  Next dialysis on Saturday      Labs & Imaging: I have personally reviewed pertinent reports  VTE Pharmacologic Prophylaxis: Heparin  VTE Mechanical Prophylaxis: sequential compression device    Code Status:   Level 1 - Full Code    Patient Centered Rounds: I have performed bedside rounds with nursing staff today  Discussions with Specialists or Other Care Team Provider:  Nephrology    Education and Discussions with Family / Patient:  Patient is updating her daughter      Current Length of Stay: 18 day(s)    Current Patient Status: Inpatient   Certification Statement: The patient will continue to require additional inpatient hospital stay due to see my assessment and plan  Subjective:   Patient is seen and examined at bedside  Denies any new complaints  Afebrile  All other ROS are negative  Objective:    Vitals: Blood pressure 138/84, pulse 61, temperature 97 8 °F (36 6 °C), temperature source Oral, resp  rate 17, height 5' 4" (1 626 m), weight 126 kg (277 lb 9 6 oz), SpO2 98 %, not currently breastfeeding  ,Body mass index is 47 65 kg/m²  SPO2 RA Rest      ED to Hosp-Admission (Current) from 3/15/2021 in [address] Med Surg Unit   SpO2  98 %   SpO2 Activity  At Rest   O2 Device  Nasal cannula   O2 Flow Rate  --        I&O:     Intake/Output Summary (Last 24 hours) at 4/2/2021 0724  Last data filed at 4/1/2021 1826  Gross per 24 hour   Intake 680 ml   Output 1000 ml   Net -320 ml       Physical Exam:    General- Alert, sitting comfortably in chair  Not in any acute distress  Neck- Supple, No JVD  CVS- regular, S1 and S2 normal  Chest- Bilateral Air entry, decreased at bases  Abdomen- soft, nontender, not distended, no guarding or rigidity, BS+  Extremities-  No pedal edema, No calf tenderness  Bilateral lower extremity dressing in place  CNS-   Alert, awake and orientedx3  No focal deficits present  Invasive Devices     Peripheral Intravenous Line            Peripheral IV 04/01/21 Dorsal (posterior); Left Wrist 1 day          Hemodialysis Catheter            HD Permanent Double Catheter 2 days                      Social History  reviewed  History reviewed  No pertinent family history   reviewed    Meds:  Current Facility-Administered Medications   Medication Dose Route Frequency Provider Last Rate Last Admin    acetaminophen (TYLENOL) tablet 650 mg  650 mg Oral Q6H PRN Martinez Berry PA-C   650 mg at 03/17/21 2320    aspirin (ECOTRIN LOW STRENGTH) EC tablet 81 mg  81 mg Oral Daily Ab Mendosa MD   81 mg at 04/01/21 1003    atorvastatin (LIPITOR) tablet 40 mg  40 mg Oral Daily Victor Manuel Elizabeth PA-C   40 mg at 04/01/21 0298    calcium acetate (PHOSLO) capsule 667 mg  667 mg Oral TID With Meals Chandler Chiu HOWARD   667 mg at 04/01/21 1710    carvedilol (COREG) tablet 12 5 mg  12 5 mg Oral BID Ama Hensley MD   12 5 mg at 04/01/21 1710    clopidogrel (PLAVIX) tablet 75 mg  75 mg Oral Daily Ab Mendosa MD   75 mg at 04/01/21 1003    dextrose 50 % IV solution 25 mL  25 mL Intravenous Once Sukhdev Mcgowan DO        diphenhydrAMINE (BENADRYL) tablet 25 mg  25 mg Oral Q6H PRN Victor Manuel Elizabeth PA-C   25 mg at 04/01/21 0353    epoetin cedric (EPOGEN,PROCRIT) injection 10,000 Units  10,000 Units Intravenous After Dialysis Ama Hensley MD   10,000 Units at 04/01/21 1353    [START ON 4/3/2021] epoetin cedric (EPOGEN,PROCRIT) injection 10,000 Units  10,000 Units Intravenous After Dialysis Ama Hensley MD        heparin (porcine) subcutaneous injection 5,000 Units  5,000 Units Subcutaneous [facility] Domenic Alexander MD   5,000 Units at 04/02/21 0537    insulin glargine (LANTUS) subcutaneous injection 25 Units 0 25 mL  25 Units Subcutaneous QAM Domenic Alexander MD   25 Units at 04/01/21 0823    insulin lispro (HumaLOG) 100 units/mL subcutaneous injection 1-6 Units  1-6 Units Subcutaneous TID AC Sarah Sanchez PA-C   1 Units at 03/31/21 4156    insulin lispro (HumaLOG) 100 units/mL subcutaneous injection 1-6 Units  1-6 Units Subcutaneous HS Victor Manuel Elizabeth PA-C   2 Units at 04/01/21 2148    insulin lispro (HumaLOG) 100 units/mL subcutaneous injection 3 Units  3 Units Subcutaneous TID With Meals Domenic Alexander MD   3 Units at 04/01/21 1223    [START ON 4/3/2021] iron sucrose (VENOFER) 100 mg in sodium chloride 0 9 % 100 mL IVPB  100 mg Intravenous Once in dialysis Ama Hensley MD       [facility] [START ON 4/[facility]21] midodrine (PROAMATINE) tablet 10 mg  10 mg Oral Before Dialysis Ama Hensley MD        ondansetron [facility] injection 4 mg  4 mg Intravenous Q6H PRN Padmini Eastman MD   4 mg at 03/25/21 1221    oxyCODONE (ROXICODONE) IR tablet 5 mg  5 mg Oral Q6H PRN Padmini Eastman MD   5 mg at 04/02/21 0542      Medications Prior to Admission   Medication    aspirin (ECOTRIN LOW STRENGTH) 81 mg EC tablet    atorvastatin (LIPITOR) 40 mg tablet    carvedilol (COREG) 25 mg tablet    clopidogrel (PLAVIX) 75 mg tablet    hydrALAZINE (APRESOLINE) 50 mg tablet    torsemide (DEMADEX) 20 mg tablet    amLODIPine (NORVASC) 2 5 mg tablet    insulin glargine (Toujeo SoloStar) 300 units/mL CONCETRATED U-300 injection pen (1-unit dial)    nitroglycerin (NITROSTAT) 0 4 mg SL tablet       Labs:  Results from last 7 days   Lab Units 04/02/21  0537 04/01/21  0626 03/30/21 2009 03/30/21  0628   WBC Thousand/uL 9 66 7 99  --  7 34   HEMOGLOBIN g/dL 6 8* 6 6* 6 6* 6 3*   HEMATOCRIT % 23 3* 23 2* 22 2* 21 9*   PLATELETS Thousands/uL 87* 105*  --  170   NEUTROS PCT % 71 70  --  72   LYMPHS PCT % 9* 11*  --  10*   MONOS PCT % 8 7  --  6   EOS PCT % 11* 11*  --  11*     Results from last 7 days   Lab Units 04/02/21  0537 04/01/21  0626 03/31/21  1632   POTASSIUM mmol/L 4 6 4 5 4 8   CHLORIDE mmol/L 105 104 107   CO2 mmol/L 23 23 28   BUN mg/dL 59* 85* 94*   CREATININE mg/dL 4 31* 5 65* 5 75*   CALCIUM mg/dL 8 1* 8 3 8 0*     Lab Results   Component Value Date    TROPONINI <0 02 03/15/2021    TROPONINI 0 02 01/07/2020    TROPONINI <0 02 05/11/2017    CKTOTAL 53 04/07/2014         Lab Results   Component Value Date    BLOODCX No Growth After 5 Days  03/15/2021    BLOODCX No Growth After 5 Days  03/15/2021    URINECX <10,000 cfu/ml  03/16/2021         Imaging:  Results for orders placed during the hospital encounter of 03/15/21   XR chest 1 view portable    Narrative CHEST     INDICATION:   weakness  COMPARISON:  Chest radiograph from 1/7/2020 and 5/11/2017  EXAM PERFORMED/VIEWS:  XR CHEST PORTABLE      FINDINGS:    Heart size exaggerated by the portable projection and suboptimal inspiration  Lungs clear  No effusion or pneumothorax  Osseous structures normal for age  Impression No acute cardiopulmonary disease  Workstation performed: [id number]       Results for orders placed during the hospital encounter of 01/07/20   XR chest 2 views    Narrative CHEST     INDICATION:   Chest Pain  COMPARISON:  5/11/2017    EXAM PERFORMED/VIEWS:  XR CHEST PA & LATERAL      FINDINGS:    Cardiomediastinal silhouette appears unremarkable  Crowding of the pulmonary markings secondary to shallow inspiration  Grossly clear lungs  No pneumothorax or pleural effusion  Osseous structures appear within normal limits for patient age  Impression No acute cardiopulmonary disease          Workstation performed: [id number]         Last 24 Hours Medication List:   Current Facility-Administered Medications   Medication Dose Route Frequency Provider Last Rate    acetaminophen  650 mg Oral Q6H PRN Heidi Negron PA-C      aspirin  81 mg Oral Daily Ag Kyle MD      atorvastatin  40 mg Oral Daily Heidi Negron PA-C      calcium acetate  667 mg Oral TID With Meals HOWARD He      carvedilol  12 5 mg Oral BID Maurice Taylor MD      clopidogrel  75 mg Oral Daily Ag Kyle MD      dextrose  25 mL Intravenous Once Christiana Ken DO      diphenhydrAMINE  25 mg Oral Q6H PRN Heidi Negron PA-C      epoetin cedric  10,000 Units Intravenous After Dialysis Maurice Taylor MD     Christian Pert [START ON 4/3/2021] epoetin cedric  10,000 Units Intravenous After Dialysis Maurice Taylor MD      heparin (porcine)  5,000 Units Subcutaneous [facility] Myla Mullen MD      insulin glargine  25 Units Subcutaneous QAM Myla Mullen MD      insulin lispro  1-6 Units Subcutaneous TID AC Sarah Sanchez PA-C      insulin lispro  1-6 Units Subcutaneous HS Heidi Negron PA-C      insulin lispro  3 Units Subcutaneous TID With Meals Myla Mullen MD      [START ON 4/3/2021] iron sucrose 100 mg Intravenous Once in dialysis MD Valeri Rojasenza Roque [START ON 4/3/2021] midodrine  10 mg Oral Before Dialysis Abi Campos MD      ondansetron  4 mg Intravenous Q6H PRN Allyn Tomlin MD      oxyCODONE  5 mg Oral Q6H PRN Allyn Tomlin MD          Today, Patient Was Seen By: Allyn Tomlin MD    ** Please Note: Dictation voice to text software may have been used in the creation of this document   **

## 2021-04-02 NOTE — ASSESSMENT & PLAN NOTE
Wt Readings from Last 3 Encounters:   04/02/21 126 kg (277 lb 9 6 oz)   02/21/20 117 kg (258 lb)   01/07/20 117 kg (258 lb 12 8 oz)     Patient was admitted with increased lower extremity edema due to acute systolic CHF due to noncompliance with medications  Ejection fraction was 45-50% on this admission  Patient was treated with IV Bumex then IV diuretics were held because of worsening renal function then restarted on 3/26/2021 due to increasing lower extremity edema and oxygen requirement   Lungs are clear to auscultation but patient has worsening lower extremity edema  Patient was switched to Bumex drip on 03/29  Bumex drip was discontinued on 03/30 Daily weight and I&Os  Continue on dialysis per Nephrology  Patient underwent dialysis for past 3 days    Next dialysis on Saturday

## 2021-04-02 NOTE — DISCHARGE INSTR - AVS FIRST PAGE
Wound Care  · Wash legs soap and water  · Maxorb Ag, ABD's(x3 Right, x2 Left), Kerlix, and Ace wrap(x1 - 4", x1 - 6" per leg) to be done every other day  · Ace wraps from forefoot to knee bilateral fairly tight    · DO NOT allow patient to sit/sleep with legs dependent !!!!!!!!!!!!!  · Schedule follow-up for wound care on a Wednesday with Dr Tayo Parada

## 2021-04-02 NOTE — PROGRESS NOTES
Progress Note - Podiatry  Maximiliano Carrion 60 y o  female MRN: [id number]  Unit/Bed#: -01 Encounter: [id number]    Assessment/Plan:  1  Cellulitis bilateral legs              -resolved  2  Skin ulceration left heel partial depth with DM2    - resolved, closed 100%  3  Venous hypertension with ulceration and inflammation bilateral legs   -unless lower extremity edema improves wounds will not heal anytime soon  4  Skin ulceration bilateral legs partial depth   -nursing advised to encourage elevation of extremities and to avoid dependence at all times  -x2 wounds left, x1 wound right              - Maxorb Ag, ABDs, Kerlix, and six-inch Ace wraps applied both legs  -important to wrap legs up to knees bilateral  4  Noncompliance with medication, anemia, ADI, essential hypertension, morbid obesity, DM2   -status post kidney biopsy, results pending              -managed per Internal Medicine and Nephrology      Subjective/Objective   Chief Complaint:   Chief Complaint   Patient presents with    Foot Pain     pt c/o left heel pain  pt has had pain for few days  pt also has ulcers on top of left foot  Subjective:  29-year-old white female resting in bed awaiting wound evaluation  Denies any new pain or discomfort from legs  Relates left heel feels much better  Chronic lower extremity edema persists  Denies any fever or shortness of breath  Blood pressure 138/84, pulse 61, temperature 97 8 °F (36 6 °C), temperature source Oral, resp  rate 17, height 5' 4" (1 626 m), weight 126 kg (277 lb 9 6 oz), SpO2 98 %, not currently breastfeeding  ,Body mass index is 47 65 kg/m²  Invasive Devices     Peripheral Intravenous Line            Peripheral IV 04/01/21 Dorsal (posterior); Left Wrist 1 day          Hemodialysis Catheter            HD Permanent Double Catheter 3 days                Physical Exam:   General appearance: alert, cooperative and no distress  Neuro/Vascular: Normal strength  Sensation and reflexes:  Diminished epicritic sensation  Pulses: Right: DP 0/4, PT 0/4, Left: DP 0/4, PT 0/4  Capillary Filling:  3 Sec, Edema:  + 2 - +3 edema bilateral  Extremity:  · Left heel:   100% closed with superficial skin slough/flaking  Overall good progress  · Right leg:  X1 partial depth venous hypertension ulcerations anterior right leg , moderate serous drainage  Erythema has resolved  +3 edema persists  · Left leg:  X2 partial depth venous hypertension ulcerations, large posterior and medium size on the medial aspect of the calf  No evidence of infection  Cellulitis has resolved  Moderate serosanguineous drainage noted  +3 edema persists  Labs and other studies:   CBC w/diff  Results from last 7 days   Lab Units 04/02/21  0537   WBC Thousand/uL 9 66   HEMOGLOBIN g/dL 6 8*   HEMATOCRIT % 23 3*   PLATELETS Thousands/uL 87*   NEUTROS PCT % 71   LYMPHS PCT % 9*   MONOS PCT % 8   EOS PCT % 11*     BMP  Results from last 7 days   Lab Units 04/02/21  0537   POTASSIUM mmol/L 4 6   CHLORIDE mmol/L 105   CO2 mmol/L 23   BUN mg/dL 59*   CREATININE mg/dL 4 31*   CALCIUM mg/dL 8 1*     CMP  Results from last 7 days   Lab Units 04/02/21  0537   POTASSIUM mmol/L 4 6   CHLORIDE mmol/L 105   CO2 mmol/L 23   BUN mg/dL 59*   CREATININE mg/dL 4 31*   CALCIUM mg/dL 8 1*       @Culture@  Lab Results   Component Value Date    BLOODCX No Growth After 5 Days  03/15/2021    BLOODCX No Growth After 5 Days   03/15/2021    URINECX <10,000 cfu/ml  03/16/2021     No results found for: WOUNDCULT      Current Facility-Administered Medications:     acetaminophen (TYLENOL) tablet 650 mg, 650 mg, Oral, Q6H PRN, Sarah Sanchez PA-C, 650 mg at 03/17/21 2320    aspirin (ECOTRIN LOW STRENGTH) EC tablet 81 mg, 81 mg, Oral, Daily, Kayode Tanner MD, 81 mg at 04/02/21 0847    atorvastatin (LIPITOR) tablet 40 mg, 40 mg, Oral, Daily, Sarah Sanchez PA-C, 40 mg at 04/02/21 0847    calcium acetate (PHOSLO) capsule 667 mg, 667 mg, Oral, TID With Meals, Sirena IdaliaHOWARD, 667 mg at 04/02/21 1310    carvedilol (COREG) tablet 12 5 mg, 12 5 mg, Oral, BID, Abi Campos MD, 12 5 mg at 04/02/21 0847    clopidogrel (PLAVIX) tablet 75 mg, 75 mg, Oral, Daily, Allyn Tomlin MD, 75 mg at 04/02/21 0847    dextrose 50 % IV solution 25 mL, 25 mL, Intravenous, Once, Jesse Flaherty DO    diphenhydrAMINE (BENADRYL) tablet 25 mg, 25 mg, Oral, Q6H PRN, Mikayla Mock PA-C, 25 mg at 04/01/21 0353    epoetin cedric (EPOGEN,PROCRIT) injection 10,000 Units, 10,000 Units, Intravenous, After Dialysis, Abi Campos MD, 10,000 Units at 04/01/21 1353    [START ON 4/3/2021] epoetin cedric (EPOGEN,PROCRIT) injection 10,000 Units, 10,000 Units, Intravenous, After Dialysis, Abi Campos MD    heparin (porcine) subcutaneous injection 5,000 Units, 5,000 Units, Subcutaneous, Q8H [facility], Tonia Mahmood MD, 5,000 Units at 04/02/21 1313    insulin glargine (LANTUS) subcutaneous injection 25 Units 0 25 mL, 25 Units, Subcutaneous, QAM, Tonia Mahmood MD, 25 Units at 04/02/21 0847    insulin lispro (HumaLOG) 100 units/mL subcutaneous injection 1-6 Units, 1-6 Units, Subcutaneous, TID AC, 1 Units at 04/02/21 1310 **AND** Fingerstick Glucose (POCT), , , TID AC, Sarah Sanchez PA-C    insulin lispro (HumaLOG) 100 units/mL subcutaneous injection 1-6 Units, 1-6 Units, Subcutaneous, HS, Sarah Sanchez PA-C, 2 Units at 04/01/21 2148    insulin lispro (HumaLOG) 100 units/mL subcutaneous injection 3 Units, 3 Units, Subcutaneous, TID With Meals, Tonia Mahmood MD, 3 Units at 04/02/21 1310    [START ON 4/3/2021] iron sucrose (VENOFER) 100 mg in sodium chloride 0 9 % 100 mL IVPB, 100 mg, Intravenous, Once in dialysis, MD Sarah Rojas  [START ON 4/3/2021] midodrine (PROAMATINE) tablet 10 mg, 10 mg, Oral, Before Dialysis, Abi Campos MD    ondansetron [facility]) injection 4 mg, 4 mg, Intravenous, Q6H PRN, Allyn Tomlin MD, 4 mg at 03/25/21 1221    oxyCODONE (ROXICODONE) IR tablet 5 mg, 5 mg, Oral, Q6H PRN, Danielle Francisco MD, 5 mg at 04/02/21 0526    Imaging: I have personally reviewed pertinent films in PACS  EKG, Pathology, and Other Studies: I have personally reviewed pertinent reports    VTE Pharmacologic Prophylaxis: Heparin  VTE Mechanical Prophylaxis: reason for no mechanical VTE prophylaxis Bilateral leg wounds    Janet Shown, DPM

## 2021-04-02 NOTE — CASE MANAGEMENT
LOS; 18 days  Continuing to follow patient  Met with patient and family at bedside  They have concerns about patient returning home as she has 8 steps to enter her home  As per PT/OT notes patient ambulating independently in room  Discussed possible need for SNF - daughter states my mother is not going to a nursing home  Daughter upset that patient is weak and did not get PT/OT daily here while at the hospital   Explained that patients are not necessarily seen daily while here  Daughter states she was lied to and told that patient woulld be seen daily  [facility]/SN/PT/OT already arranged for patient  Discussed that patient will most likely need home O2 at discharge  Encouraged patient to speak with MD if she feels unready to be discharged after dialysis tomorrow

## 2021-04-02 NOTE — ASSESSMENT & PLAN NOTE
Patient admitted with bilateral lower extremity cellulitis and was treated with IV cefepime and vancomycin  Cellulitis resolved  83 y/o F PMH HTN, breast ca diagnosed ~ 30 years ago s/p b/l mastectomy, history of heart failure?, hx of diverticulitis, who presents with AMS and profuse diarrhea from home.

## 2021-04-02 NOTE — PROGRESS NOTES
NEPHROLOGY PROGRESS NOTE   Emma Hodgkins 60 y o  female MRN: [id number]  Unit/Bed#: -01 Encounter: [id number]  Reason for Consult: ADI      SUMMARY:    Patient is 76-year-old female with significant medical issues of hypertension for many years, diabetes for 15 to 20 years, diastolic CHF, progressive worsening CKD, presented with worsening leg edema, leg wounds, worsening shortness of breath  We are consulted for ADI/CKD management  ASSESSMENT and PLAN:    Acute kidney injury, likely secondary to acute tubular necrosis in the setting of volume depletion along with hypotension and cellulitis   Admission creatinine 3 23 mg/dL   Renal function continued to worsen with symptoms of uremia and volume overload  Started on HD on 3/30/21 for 1st treatment  Currently on her 3rd dialysis treatment  Once outpatient dialysis set up stable from a renal standpoint for discharge  Shoshana Weber pursue PD options as an outpatient  Plan for next dialysis on Saturday     Dialysis content was obtained by my colleague       Biopsy proven severe nodular diabetic glomerular sclerosis with severe tubular atrophy and interstitial fibrosis with severe arterial sclerosis and atherosclerosis with hyalinosis      Chronic kidney disease stage IV, with underlying progression of disease   Baseline creatinine in the low 2s back in 2020  She will now be deemed End Stage Renal Disease  Outpatient dialysis has been set up Tuesday Thursday Saturday at [facility]  Plan for dialysis tomorrow and would be stable from a renal standpoint for discharge with her next dialysis being at outpatient on Tuesday     Anemia chronic disease, SPEP/UPEP were negative for monoclonal gammopathy  Refused blood transfusion  Started on Epogen with dialysis  Also given IV iron sucrose due to iron deficiency      Volume overload, ultrafiltration with dialysis  Was able to ultrafiltrate 0 7 kg yesterday  Plan for dialysis tomorrow    I will give her midodrine prior to dialysis to allow more ultrafiltration     Hypertension, majority of her antihypertensive agents have been discontinued by me  Reduce carvedilol 12 5 mg every 12 hours with hold parameters  Hold carvedilol on mornings of dialysis  Will give midodrine 10 mg prior to dialysis to prevent intra dialytic low blood pressure as well as to allow ultrafiltration      Hyperkalemia, resolved now on hemodialysis     Mineral bone disorder-chronic kidney disease,   4, phosphorus also at target at 4 6      SUBJECTIVE / INTERVAL HISTORY:    No overnight events  Underwent dialysis yesterday  OBJECTIVE:  Current Weight: Weight - Scale: 126 kg (277 lb 9 6 oz)  Vitals:    04/01/21 2130 04/01/21 2218 04/02/21 0530 04/02/21 0717   BP: 161/68   138/84   BP Location: Right arm   Right arm   Pulse: 69   61   Resp: 18   17   Temp: 98 1 °F (36 7 °C)   97 8 °F (36 6 °C)   TempSrc: Oral   Oral   SpO2:  97%  98%   Weight:   126 kg (277 lb 9 6 oz)    Height:           Intake/Output Summary (Last 24 hours) at 4/2/2021 1405  Last data filed at 4/1/2021 1826  Gross per 24 hour   Intake 480 ml   Output 1000 ml   Net -520 ml       Review of Systems:    12 point ROS has been reviewed  Physical Exam  Vitals signs and nursing note reviewed  Exam conducted with a chaperone present  Constitutional:       General: She is not in acute distress  Appearance: Normal appearance  She is obese  She is not ill-appearing, toxic-appearing or diaphoretic  HENT:      Head: Normocephalic and atraumatic  Eyes:      General: No scleral icterus  Right eye: No discharge  Left eye: No discharge  Neck:      Musculoskeletal: Normal range of motion  Cardiovascular:      Rate and Rhythm: Normal rate  Pulmonary:      Effort: No respiratory distress  Abdominal:      Tenderness: There is no guarding  Musculoskeletal:      Right lower leg: Edema present  Left lower leg: Edema present     Neurological:      Mental Status: She is alert and oriented to person, place, and time  Mental status is at baseline     Psychiatric:         Mood and Affect: Mood normal          Medications:    Current Facility-Administered Medications:     acetaminophen (TYLENOL) tablet 650 mg, 650 mg, Oral, Q6H PRN, Anabelle Asher PA-C, 650 mg at 03/17/21 2320    aspirin (ECOTRIN LOW STRENGTH) EC tablet 81 mg, 81 mg, Oral, Daily, Brittany Melendez MD, 81 mg at 04/02/21 0847    atorvastatin (LIPITOR) tablet 40 mg, 40 mg, Oral, Daily, Sarah Sanchez PA-C, 40 mg at 04/02/21 0847    calcium acetate (PHOSLO) capsule 667 mg, 667 mg, Oral, TID With Meals, HOWARD Peñaloza, 667 mg at 04/02/21 1310    carvedilol (COREG) tablet 12 5 mg, 12 5 mg, Oral, BID, Hitesh Adam MD, 12 5 mg at 04/02/21 0847    clopidogrel (PLAVIX) tablet 75 mg, 75 mg, Oral, Daily, Brittany Melendez MD, 75 mg at 04/02/21 0847    dextrose 50 % IV solution 25 mL, 25 mL, Intravenous, Once, Heather Gomez DO    diphenhydrAMINE (BENADRYL) tablet 25 mg, 25 mg, Oral, Q6H PRN, Sarah Sanchez PA-C, 25 mg at 04/01/21 0353    epoetin cedric (EPOGEN,PROCRIT) injection 10,000 Units, 10,000 Units, Intravenous, After Dialysis, Hitesh Adam MD, 10,000 Units at 04/01/21 1353    [START ON 4/3/2021] epoetin cedric (EPOGEN,PROCRIT) injection 10,000 Units, 10,000 Units, Intravenous, After Dialysis, Hitesh Adam MD    heparin (porcine) subcutaneous injection 5,000 Units, 5,000 Units, Subcutaneous, Q8H [address], Ronaldo Sarmiento MD, 5,000 Units at 04/02/21 1313    insulin glargine (LANTUS) subcutaneous injection 25 Units 0 25 mL, 25 Units, Subcutaneous, QAM, Ronaldo Sarmiento MD, 25 Units at 04/02/21 0847    insulin lispro (HumaLOG) 100 units/mL subcutaneous injection 1-6 Units, 1-6 Units, Subcutaneous, TID AC, 1 Units at 04/02/21 1310 **AND** Fingerstick Glucose (POCT), , , TID AC, Sarah Sanchez PA-C    insulin lispro (HumaLOG) 100 units/mL subcutaneous injection 1-6 Units, 1-6 Units, Subcutaneous, HS, Sarah Sanchez PA-C, 2 Units at 04/01/21 2148    insulin lispro (HumaLOG) 100 units/mL subcutaneous injection 3 Units, 3 Units, Subcutaneous, TID With Meals, Rosana Osei MD, 3 Units at 04/02/21 1310    [START ON 4/3/2021] iron sucrose (VENOFER) 100 mg in sodium chloride 0 9 % 100 mL IVPB, 100 mg, Intravenous, Once in dialysis, MD Angelia Strong  [START ON 4/3/2021] midodrine (PROAMATINE) tablet 10 mg, 10 mg, Oral, Before Dialysis, Jas Marcus MD    ondansetron [facility]) injection 4 mg, 4 mg, Intravenous, Q6H PRN, Fiordaliza Ulrich MD, 4 mg at 03/25/21 1221    oxyCODONE (ROXICODONE) IR tablet 5 mg, 5 mg, Oral, Q6H PRN, Fiordaliza Ulrich MD, 5 mg at 04/02/21 0542    Laboratory Results:  Results from last 7 days   Lab Units 04/02/21  0537 04/01/21  0626 03/31/21  1632 03/30/21 2009 03/30/21  0628 03/29/21  0610 03/28/21  0556 03/27/21  0521 03/26/21  1915   WBC Thousand/uL 9 66 7 99  --   --  7 34  --  10 57* 10 08  --    HEMOGLOBIN g/dL 6 8* 6 6*  --  6 6* 6 3*  --  7 2* 7 2* 6 7*   HEMATOCRIT % 23 3* 23 2*  --  22 2* 21 9*  --  24 8* 25 0* 23 2*   PLATELETS Thousands/uL 87* 105*  --   --  170  --  185 186  --    POTASSIUM mmol/L 4 6 4 5 4 8  --  5 2 5 2 5 2 5 0  --    CHLORIDE mmol/L 105 104 107  --  107 108 107 107  --    CO2 mmol/L 23 23 28  --  26 27 26 22  --    BUN mg/dL 59* 85* 94*  --  107* 100* 89* 82*  --    CREATININE mg/dL 4 31* 5 65* 5 75*  --  6 16* 5 95* 5 49* 4 93*  --    CALCIUM mg/dL 8 1* 8 3 8 0*  --  7 8* 8 0* 7 9* 8 3  --    MAGNESIUM mg/dL  --   --  2 5  --   --   --   --   --   --    PHOSPHORUS mg/dL  --  4 6* 4 8*  --   --   --   --   --   --

## 2021-04-02 NOTE — ASSESSMENT & PLAN NOTE
Lab Results   Component Value Date    EGFR 10 04/02/2021    EGFR 7 04/01/2021    EGFR 7 03/31/2021    CREATININE 4 31 (H) 04/02/2021    CREATININE 5 65 (H) 04/01/2021    CREATININE 5 75 (H) 03/31/2021     Patient has stage IV chronic disease with baseline creatinine between 1 49-2  15  She presented with acute kidney injury  She has nephrotic range proteinuria  Nephrology was consulted  Patient underwent renal biopsy on March 25th  She denies any abdominal pain or signs of bleeding  Patient creatinine was 6 16 on 03/30  Creatinine this morning is 4 31  Patient was on Bumex drip which was discontinued by Nephrology  Renal biopsy results showed severe nodular diabetic glomerular sclerosis with severe tubular atrophy and interstitial fibrosis with severe arterial sclerosis and atherosclerosis with hyalinosis  Patient had dialysis catheter placed by IR and underwent dialysis yesterday on 03/30/2021 and 03/31/2021 and 4/01/2021  Patient is set up for dialysis as outpatient with Tamika Likes on Tuesday, Thursday and Saturday  COVID test is ordered  Patient will be getting dialyzed on Saturday while inpatient  On 3 L of supplemental oxygen  Wean as tolerated

## 2021-04-02 NOTE — ASSESSMENT & PLAN NOTE
Patient has coronary disease and she status post stent placements in 2018  She denies any chest pain or shortness of breath currently    Continue on aspirin and Plavix  Continue atorvastatin

## 2021-04-03 ENCOUNTER — APPOINTMENT (INPATIENT)
Dept: DIALYSIS | Facility: HOSPITAL | Age: 66
DRG: 698 | End: 2021-04-03
Attending: INTERNAL MEDICINE
Payer: COMMERCIAL

## 2021-04-03 LAB
ABO GROUP BLD: NORMAL
ABO GROUP BLD: NORMAL
ANION GAP SERPL CALCULATED.3IONS-SCNC: 12 MMOL/L (ref 4–13)
BASOPHILS # BLD AUTO: 0.03 THOUSANDS/ΜL (ref 0–0.1)
BASOPHILS NFR BLD AUTO: 0 % (ref 0–1)
BLD GP AB SCN SERPL QL: NEGATIVE
BUN SERPL-MCNC: 70 MG/DL (ref 5–25)
CALCIUM SERPL-MCNC: 8.4 MG/DL (ref 8.3–10.1)
CHLORIDE SERPL-SCNC: 107 MMOL/L (ref 100–108)
CO2 SERPL-SCNC: 24 MMOL/L (ref 21–32)
CREAT SERPL-MCNC: 4.83 MG/DL (ref 0.6–1.3)
EOSINOPHIL # BLD AUTO: 1.06 THOUSAND/ΜL (ref 0–0.61)
EOSINOPHIL NFR BLD AUTO: 12 % (ref 0–6)
ERYTHROCYTE [DISTWIDTH] IN BLOOD BY AUTOMATED COUNT: 17.8 % (ref 11.6–15.1)
GFR SERPL CREATININE-BSD FRML MDRD: 9 ML/MIN/1.73SQ M
GLUCOSE SERPL-MCNC: 103 MG/DL (ref 65–140)
GLUCOSE SERPL-MCNC: 105 MG/DL (ref 65–140)
GLUCOSE SERPL-MCNC: 129 MG/DL (ref 65–140)
GLUCOSE SERPL-MCNC: 151 MG/DL (ref 65–140)
GLUCOSE SERPL-MCNC: 169 MG/DL (ref 65–140)
HCT VFR BLD AUTO: 22.3 % (ref 34.8–46.1)
HGB BLD-MCNC: 6.4 G/DL (ref 11.5–15.4)
IMM GRANULOCYTES # BLD AUTO: 0.03 THOUSAND/UL (ref 0–0.2)
IMM GRANULOCYTES NFR BLD AUTO: 0 % (ref 0–2)
LYMPHOCYTES # BLD AUTO: 0.85 THOUSANDS/ΜL (ref 0.6–4.47)
LYMPHOCYTES NFR BLD AUTO: 9 % (ref 14–44)
MCH RBC QN AUTO: 28.7 PG (ref 26.8–34.3)
MCHC RBC AUTO-ENTMCNC: 28.7 G/DL (ref 31.4–37.4)
MCV RBC AUTO: 100 FL (ref 82–98)
MONOCYTES # BLD AUTO: 0.67 THOUSAND/ΜL (ref 0.17–1.22)
MONOCYTES NFR BLD AUTO: 7 % (ref 4–12)
NEUTROPHILS # BLD AUTO: 6.56 THOUSANDS/ΜL (ref 1.85–7.62)
NEUTS SEG NFR BLD AUTO: 72 % (ref 43–75)
NRBC BLD AUTO-RTO: 0 /100 WBCS
PLATELET # BLD AUTO: 97 THOUSANDS/UL (ref 149–390)
PMV BLD AUTO: 11 FL (ref 8.9–12.7)
POTASSIUM SERPL-SCNC: 4.6 MMOL/L (ref 3.5–5.3)
RBC # BLD AUTO: 2.23 MILLION/UL (ref 3.81–5.12)
RH BLD: NEGATIVE
RH BLD: NEGATIVE
SODIUM SERPL-SCNC: 143 MMOL/L (ref 136–145)
SPECIMEN EXPIRATION DATE: NORMAL
WBC # BLD AUTO: 9.2 THOUSAND/UL (ref 4.31–10.16)

## 2021-04-03 PROCEDURE — 86920 COMPATIBILITY TEST SPIN: CPT

## 2021-04-03 PROCEDURE — 80048 BASIC METABOLIC PNL TOTAL CA: CPT | Performed by: INTERNAL MEDICINE

## 2021-04-03 PROCEDURE — 85025 COMPLETE CBC W/AUTO DIFF WBC: CPT | Performed by: INTERNAL MEDICINE

## 2021-04-03 PROCEDURE — 99232 SBSQ HOSP IP/OBS MODERATE 35: CPT | Performed by: INTERNAL MEDICINE

## 2021-04-03 PROCEDURE — 86900 BLOOD TYPING SEROLOGIC ABO: CPT | Performed by: INTERNAL MEDICINE

## 2021-04-03 PROCEDURE — 86850 RBC ANTIBODY SCREEN: CPT | Performed by: INTERNAL MEDICINE

## 2021-04-03 PROCEDURE — 82948 REAGENT STRIP/BLOOD GLUCOSE: CPT

## 2021-04-03 PROCEDURE — 97164 PT RE-EVAL EST PLAN CARE: CPT

## 2021-04-03 PROCEDURE — 97116 GAIT TRAINING THERAPY: CPT

## 2021-04-03 PROCEDURE — 90935 HEMODIALYSIS ONE EVALUATION: CPT | Performed by: INTERNAL MEDICINE

## 2021-04-03 PROCEDURE — 30233N1 TRANSFUSION OF NONAUTOLOGOUS RED BLOOD CELLS INTO PERIPHERAL VEIN, PERCUTANEOUS APPROACH: ICD-10-PCS | Performed by: INTERNAL MEDICINE

## 2021-04-03 PROCEDURE — 86901 BLOOD TYPING SEROLOGIC RH(D): CPT | Performed by: INTERNAL MEDICINE

## 2021-04-03 PROCEDURE — P9016 RBC LEUKOCYTES REDUCED: HCPCS

## 2021-04-03 RX ADMIN — CALCIUM ACETATE 667 MG: 667 CAPSULE ORAL at 20:42

## 2021-04-03 RX ADMIN — MIDODRINE HYDROCHLORIDE 10 MG: 5 TABLET ORAL at 13:01

## 2021-04-03 RX ADMIN — ASPIRIN 81 MG: 81 TABLET, COATED ORAL at 08:37

## 2021-04-03 RX ADMIN — CALCIUM ACETATE 667 MG: 667 CAPSULE ORAL at 08:37

## 2021-04-03 RX ADMIN — OXYCODONE HYDROCHLORIDE 5 MG: 5 TABLET ORAL at 14:23

## 2021-04-03 RX ADMIN — EPOETIN ALFA 10000 UNITS: 10000 SOLUTION INTRAVENOUS; SUBCUTANEOUS at 14:34

## 2021-04-03 RX ADMIN — ATORVASTATIN CALCIUM 40 MG: 40 TABLET, FILM COATED ORAL at 08:36

## 2021-04-03 RX ADMIN — HEPARIN SODIUM 5000 UNITS: 5000 INJECTION INTRAVENOUS; SUBCUTANEOUS at 06:25

## 2021-04-03 RX ADMIN — CLOPIDOGREL BISULFATE 75 MG: 75 TABLET ORAL at 08:37

## 2021-04-03 RX ADMIN — CARVEDILOL 12.5 MG: 12.5 TABLET, FILM COATED ORAL at 08:36

## 2021-04-03 RX ADMIN — OXYCODONE HYDROCHLORIDE 5 MG: 5 TABLET ORAL at 02:58

## 2021-04-03 NOTE — PLAN OF CARE
HD tx plan: 3 hrs, removing 1 5-2L as josh per physician order  2K bath for K 4 6 4/3  Post-Dialysis RN Treatment Note    Blood Pressure:  Pre 153/69 mm/Hg  Post 151/69 mmHg   EDW  tbd   Weight:  Pre 126 5 kg   Post 124 8 kg   Mode of weight measurement: Bed Scale   Volume Removed  1500 ml    Treatment duration 180 minutes    NS given  No    Treatment shortened? No   Medications given during Rx Epogen 29569 units   Estimated Kt/V  None Reported   Access type: Permacath/TDC   Access Status: No    Report called to primary nurse   Yes  Glenn Rosario RN    Pt dialyzed for 3 hrs today  1 unit prbc transfused with dialysis  Last 25 minutes of hd pt c/o "feeling funny" and then a headache  BP wnl at the time of symptoms  Toro LAWRENCE with nephrology was present while pt still had symptoms  Pt stated headache improved approx 30 minutes after reinfusion      Problem: METABOLIC, FLUID AND ELECTROLYTES - ADULT  Goal: Electrolytes maintained within normal limits  Description: INTERVENTIONS:  - Monitor labs and assess patient for signs and symptoms of electrolyte imbalances  - Administer electrolyte replacement as ordered  - Monitor response to electrolyte replacements, including repeat lab results as appropriate  - Instruct patient on fluid and nutrition as appropriate  Outcome: Progressing     Problem: METABOLIC, FLUID AND ELECTROLYTES - ADULT  Goal: Fluid balance maintained  Description: INTERVENTIONS:  - Monitor labs   - Monitor I/O and WT  - Instruct patient on fluid and nutrition as appropriate  - Assess for signs & symptoms of volume excess or deficit  Outcome: Progressing

## 2021-04-03 NOTE — PHYSICAL THERAPY NOTE
PHYSICAL THERAPY RE-eval/TX       04/03/21 0830   PT Last Visit   PT Visit Date 04/03/21   Note Type   Note type Re-Evaluation   Pain Assessment   Pain Assessment Tool Barrett-Baker FACES   Barrett-Baker FACES Pain Rating 2   Pain Location/Orientation Orientation: Bilateral;Location: Leg   Home Living   Type of [address] One level  (3+5 SONIA with 1 railing)   Home Equipment Walker   Prior Function   Level of [place] Needs assistance with IADLs; Needs assistance with ADLs and functional mobility   Lives With Daughter   Receives Help From Family   ADL Assistance Needs assistance   IADLs Needs assistance   Vocational Works at home   Restrictions/Precautions   Weight Bearing Precautions Per Order No   Other Precautions Pain; Fall Risk;O2   General   Additional Pertinent History O2 Desat to 85% on 4L with activity  (Orthostatic hypotension)   Family/Caregiver Present No   Cognition   Overall Cognitive Status WFL   Arousal/Participation Alert   Attention Within functional limits   Orientation Level Oriented X4   Memory Within functional limits   Following Commands Follows all commands and directions without difficulty   RLE Assessment   RLE Assessment   (Limited assessment due to pain, swelling)   LLE Assessment   LLE Assessment   (Limited assesment due to pain and swelling)   Bed Mobility   Additional Comments Received OOB in chair   Transfers   Sit to Stand 6  Modified independent   Additional items Armrests   Stand to Sit 6  Modified independent   Additional items Armrests   Ambulation/Elevation   Gait pattern Short stride; Foward flexed;Decreased foot clearance   Gait Assistance 5  Supervision   Additional items Verbal cues   Assistive Device Rolling walker   Distance 10ft x 2   Stair Management Assistance 4  Minimal assist   Additional items Assist x 1;Verbal cues; Tactile cues   Stair Management Technique One rail L  (2 hands on railing)   Number of Stairs 1  (First attempt, unable to complete   )   Balance   Static Sitting Normal   Dynamic Sitting Good   Static Standing Fair +   Dynamic Standing Fair +   Ambulatory Fair   Endurance Deficit   Endurance Deficit Yes   Endurance Deficit Description Tires very fast  Rest breaks needed   Activity Tolerance   Activity Tolerance Treatment limited secondary to medical complications (Comment)  (Orthostatic hypotension, SOB, fatigue)   Medical Staff Made Aware MD, Dr Andriy Overton RN, Julissa [place]   Assessment   Prognosis Good   Problem List Decreased strength;Decreased endurance; Impaired balance;Decreased mobility;Obesity; Decreased skin integrity;Pain   Assessment Patient for re-evaluation  Current HGB 6 4  Patient refusing transfusions  Patient reports mild pain in the LE's  No SOB at rest  Reports that she is tired  First attempt, patient was unable to complete stairs  Needed to sit down as she felt weak and fatigued  Able to complete 1 step for next trial but became dizzy and lightheaded  Seated BP after stair trial: 121/56  After sitting approx 2-3 minutes 147/63  Standing 127/60  Standing after 2-3 minutes 118/56  Patient with dizziness/lightheadedness again  Also with O2 desat to 85% on 4L with activity  Unable to assess further mobility due to medical status  Also, patient was only able to complete 1 step and has 8 steps to enter  She is no longer staying at a friends home where there was a ramped entrance  Patient is now requiring assistance and is not safe to return home  Goals have been updated  Recommend rehab  The patient's AM-PAC Basic Mobility Inpatient Short Form Raw Score is 19, Standardized Score is 42 48  A standardized score less than 42 9 suggests the patient may benefit from discharge to post-acute rehabilitation services  Please also refer to the recommendation of the Physical Therapist for safe discharge planning     Barriers to Discharge Inaccessible home environment   Barriers to Discharge Comments Patient no longer staying at friends home where there were no steps  Now has 8STE   Goals   Patient Goals To go home   STG Expiration Date 04/17/21   Short Term Goal #1 1  Amb 75ft with RW at a mod I level 2  Ascend/descend 8 steps with 1 railing nonreciprocal pattern supervision level   PT Treatment Day 0   Plan   Treatment/Interventions Elevations;LE strengthening/ROM; Therapeutic exercise; Endurance training;Gait training;Spoke to nursing;Spoke to MD   PT Frequency Other (Comment)  (3-5x/wk)   Recommendation   PT Discharge Recommendation Post-Acute Rehabilitation Services   Equipment Recommended   (owns RW)   PT - OK to Discharge No   Additional Comments Patient orthostatic during session and unable to complete steps   AM-PAC Basic Mobility Inpatient   Turning in Bed Without Bedrails 3   Lying on Back to Sitting on Edge of Flat Bed 3   Moving Bed to Chair 4   Standing Up From Chair 4   Walk in Room 3   Climb 3-5 Stairs 2   Basic Mobility Inpatient Raw Score 19   Basic Mobility Standardized Score 42 48       Nyla Milian, PT        Patient Name: Loly Rodriguez  [id number] Date: 4/3/2021

## 2021-04-03 NOTE — ASSESSMENT & PLAN NOTE
Patient has normocytic anemia  Iron studies revealed anemia of chronic disease  Patient denies any signs of bleeding  Patient declined blood transfusion earlier this this week when hemoglobin was 6 7  Discussed with patient and she had declined blood transfusion and wanted to monitor the H&H  Hemoglobin this morning is 6 4  Discussed with patient and she is in agreement for transfusion this morning  Will order 1 unit of packed red cells transfusion to be given with dialysis  No active or overt rectal bleeding  Patient received Venofer  She has no abdominal tenderness and denies abdominal pain    Patient is on subcutaneous heparin for DVT prevention purposes

## 2021-04-03 NOTE — PLAN OF CARE
Problem: Potential for Falls  Goal: Patient will remain free of falls  Description: INTERVENTIONS:  - Assess patient frequently for physical needs  -  Identify cognitive and physical deficits and behaviors that affect risk of falls    -  [place] fall precautions as indicated by assessment   - Educate patient/family on patient safety including physical limitations  - Instruct patient to call for assistance with activity based on assessment  - Modify environment to reduce risk of injury  - Consider OT/PT consult to assist with strengthening/mobility  Outcome: Progressing

## 2021-04-03 NOTE — PLAN OF CARE
Problem: PHYSICAL THERAPY ADULT  Goal: Performs mobility at highest level of function for planned discharge setting  See evaluation for individualized goals  Description: Treatment/Interventions: Elevations, LE strengthening/ROM, Therapeutic exercise, Endurance training, Gait training, Spoke to nursing, Spoke to MD  Equipment Recommended: (owns RW)       See flowsheet documentation for full assessment, interventions and recommendations  Note: Prognosis: Good  Problem List: Decreased strength, Decreased endurance, Impaired balance, Decreased mobility, Obesity, Decreased skin integrity, Pain  Assessment: Patient for re-evaluation  Current HGB 6 4  Patient refusing transfusions  Patient reports mild pain in the LE's  No SOB at rest  Reports that she is tired  First attempt, patient was unable to complete stairs  Needed to sit down as she felt weak and fatigued  Able to complete 1 step for next trial but became dizzy and lightheaded  Seated BP after stair trial: 121/56  After sitting approx 2-3 minutes 147/63  Standing 127/60  Standing after 2-3 minutes 118/56  Patient with dizziness/lightheadedness again  Also with O2 desat to 85% on 4L with activity  Unable to assess further mobility due to medical status  Also, patient was only able to complete 1 step and has 8 steps to enter  She is no longer staying at a friends home where there was a ramped entrance  Patient is now requiring assistance and is not safe to return home  Goals have been updated  Recommend rehab  The patient's AM-PAC Basic Mobility Inpatient Short Form Raw Score is 19, Standardized Score is 42 48  A standardized score less than 42 9 suggests the patient may benefit from discharge to post-acute rehabilitation services  Please also refer to the recommendation of the Physical Therapist for safe discharge planning    Barriers to Discharge: Inaccessible home environment  Barriers to Discharge Comments: Patient no longer staying at friends home where there were no steps  Now has 8STE  PT Discharge Recommendation: Post-Acute Rehabilitation Services     PT - OK to Discharge: No    See flowsheet documentation for full assessment

## 2021-04-03 NOTE — ASSESSMENT & PLAN NOTE
Wt Readings from Last 3 Encounters:   04/02/21 126 kg (277 lb 9 6 oz)   02/21/20 117 kg (258 lb)   01/07/20 117 kg (258 lb 12 8 oz)     Patient was admitted with increased lower extremity edema due to acute systolic CHF due to noncompliance with medications  Ejection fraction was 45-50% on this admission  Patient was treated with IV Bumex then IV diuretics were held because of worsening renal function then restarted on 3/26/2021 due to increasing lower extremity edema and oxygen requirement   Lungs are clear to auscultation but patient has worsening lower extremity edema  Patient was switched to Bumex drip on 03/29  Bumex drip was discontinued on 03/30 Daily weight and I&Os  Continue on dialysis per Nephrology  Patient underwent dialysis for 3 days [id number]  Scheduled for dialysis today

## 2021-04-03 NOTE — ASSESSMENT & PLAN NOTE
Lab Results   Component Value Date    EGFR 9 04/03/2021    EGFR 10 04/02/2021    EGFR 7 04/01/2021    CREATININE 4 83 (H) 04/03/2021    CREATININE 4 31 (H) 04/02/2021    CREATININE 5 65 (H) 04/01/2021     Patient has stage IV chronic disease with baseline creatinine between 1 49-2  15  She presented with acute kidney injury  She has nephrotic range proteinuria  Nephrology was consulted  Patient underwent renal biopsy on March 25th  She denies any abdominal pain or signs of bleeding  Patient creatinine was 6 16 on 03/30  Creatinine this morning is 4 83  Patient was on Bumex drip which was discontinued by Nephrology  Renal biopsy results showed severe nodular diabetic glomerular sclerosis with severe tubular atrophy and interstitial fibrosis with severe arterial sclerosis and atherosclerosis with hyalinosis  Patient had dialysis catheter placed by IR and underwent dialysis yesterday on 03/30/2021 and 03/31/2021 and 4/01/2021  Patient is set up for dialysis as outpatient with Rosario Pagan on Tuesday, Thursday and Saturday  COVID test is ordered  Patient will be getting dialyzed today  On 3 L of supplemental oxygen  Wean as tolerated  Chest x-ray shows increased vascular congestion  Family requested patient to be reevaluated by PT OT and PT saw patient today and recommended skilled nursing rehab    Will await case management for skilled nursing rehab placement

## 2021-04-03 NOTE — PROGRESS NOTES
Progress note- Nephrology   Leigh Drake 60 y o  female MRN: [id number]  Unit/Bed#: -01 Encounter: [id number]    HEMODIALYSIS PROCEDURE NOTE  The patient was seen and examined on hemodialysis  diacap filter   Time:  3 hours  Sodium: 140 Blood flow: 300   Dialyzer: F160 Potassium: 3 Dialysate flow: 1 5x   Access: CVC Bicarbonate: 35 Ultrafiltration goal: -1 5   Medications on HD: epogen       ASSESSMENT/PLAN:  Acute kidney injury on top of Chronic Kidney Disease IV with progression to end-stage renal disease  Assessment and Plan:  · Etiology of ADI:  Suspect ATN in the setting of hypotension, infection with cellulitis and volume depletion  · Renal function worsened while admitted with symptoms of uremia along with volume overload and dialysis was initiated on 03/30/2021  · Patient completed 4th treatment today with UF negative 1 5 liters  · Plan: to evaluate patient Monday for further HD however, likely will be on a TTS schedule while still admitted  · Outpatient placement at [facility] is interested in pursuing PD options as an outpatient  · Patient underwent renal biopsy on 03/25/2025: Which revealed diabetic glomerular sclerosis, severe tubular atrophy, interstitial fibrosis, severe arterial sclerosis and likely progressed to end-stage renal disease    Access:   Assessment and Plan:  · PermCath placement on 03/30/2021 be IR-site intact    Proteinuria:  Assessment and plan:  · SPEP/UPEP negative for monoclonal gammopathy  · Not on Ace inhibitor or Arb  · As above status post renal biopsy      Hypertension:  BP acceptable 140s  Assessment and Plan:   Avoid variations in blood pressure   Has p r n   Midodrine 10 mg before HD days   Current medications include:  Carvedilol 12 5 mg 2 times daily,   Will UF as blood pressure allows    Anemia likely Chronic Kidney Disease:  Assessment and Plan:   Now on Epogen 38476 units with HD   Status post 1 unit packed cells today for hemoglobin of 6  4   Status post IV Venofer 100 mg x 5 doses-recommend to continue with HD   Transfuse for hemoglobin less than 7 0    Bone mineral disease of Chronic Kidney Disease:  Assessment and Plan:   Hyperphosphatemia:  On calcium acetate one tablets 3 times a day with meals   Secondary hyperparathyroidism:  Will continue monitor PTH and phosphorus as outpatient   Recommend renal diet      Electrolytes/Acid Base:  Assessment and Plan:   Sodium, phosphorus, potassium, and acidosis to be corrected with dialysis as necessary   Will continue to monitor     Disposition:  Patient stable from a nephrology/dialysis standpoint for discharge when medically cleared  Patient for outpatient dialysis at [facility]:  Patient seen and examined on dialysis  Denies chest pain or shortness of breath  Patient reported developing a headache 20 minutes left of dialysis  Also had some nausea post HD while still in room       OBJECTIVE:  Current Weight: Weight - Scale: 126 kg (277 lb 9 6 oz)  Vitals:    04/03/21 1530 04/03/21 1545 04/03/21 1600 04/03/21 1615   BP: 147/65 118/66 146/62 151/69   BP Location:    Right arm   Pulse: 71 72 72 72   Resp: 18 20 20 16   Temp:    98 °F (36 7 °C)   TempSrc:    Oral   SpO2:    94%   Weight:       Height:           Intake/Output Summary (Last 24 hours) at 4/3/2021 1711  Last data filed at 4/3/2021 1615  Gross per 24 hour   Intake 1851 ml   Output 2300 ml   Net -449 ml     General:  No acute distress, cooperative, lying flat, obese  Skin:  Warm and dry without rash  ENMT:  Mucous membranes moist, sclera anicteric  Neck:  Supple without JVD  Respiratory:  Essentially clear on auscultation without crackles, rhonchi, wheezes  Cardiac:  Regular rate and rhythm without rub, or murmur  GI:  Soft, nontender, no distention, active bowel sounds  Extremities:  Bilateral lower extremity edema noted  Neuro:  Alert oriented and awake  Psych:  Appropriate affect    Medications:    Current Facility-Administered Medications:     acetaminophen (TYLENOL) tablet 650 mg, 650 mg, Oral, Q6H PRN, Denise Todd PA-C, 650 mg at 03/17/21 2320    aspirin (ECOTRIN LOW STRENGTH) EC tablet 81 mg, 81 mg, Oral, Daily, Danielle Francisco MD, 81 mg at 04/03/21 0837    atorvastatin (LIPITOR) tablet 40 mg, 40 mg, Oral, Daily, Sarah Sanchez PA-C, 40 mg at 04/03/21 0836    calcium acetate (PHOSLO) capsule 667 mg, 667 mg, Oral, TID With Meals, Kit More, CRNP, 667 mg at 04/03/21 0837    carvedilol (COREG) tablet 12 5 mg, 12 5 mg, Oral, BID, Christian Lam MD, 12 5 mg at 04/03/21 0836    clopidogrel (PLAVIX) tablet 75 mg, 75 mg, Oral, Daily, Danielle Francisco MD, 75 mg at 04/03/21 0837    dextrose 50 % IV solution 25 mL, 25 mL, Intravenous, Once, Raquel Crews,     diphenhydrAMINE (BENADRYL) tablet 25 mg, 25 mg, Oral, Q6H PRN, Sarah Sanchez PA-C, 25 mg at 04/01/21 0353    epoetin cedric (EPOGEN,PROCRIT) injection 10,000 Units, 10,000 Units, Intravenous, After Dialysis, Christian Lam MD, 10,000 Units at 04/01/21 1353    epoetin cedric (EPOGEN,PROCRIT) injection 10,000 Units, 10,000 Units, Intravenous, After Dialysis, Christian Lam MD, 10,000 Units at 04/03/21 1434    heparin (porcine) subcutaneous injection 5,000 Units, 5,000 Units, Subcutaneous, Q8H [facility], Silvia Orellana MD, 5,000 Units at 04/03/21 0625    insulin glargine (LANTUS) subcutaneous injection 25 Units 0 25 mL, 25 Units, Subcutaneous, QAM, Silvia Orellana MD, 25 Units at 04/02/21 0847    insulin lispro (HumaLOG) 100 units/mL subcutaneous injection 1-6 Units, 1-6 Units, Subcutaneous, TID AC, 1 Units at 04/02/21 1734 **AND** Fingerstick Glucose (POCT), , , TID AC, Sarah Sanchez PA-C    insulin lispro (HumaLOG) 100 units/mL subcutaneous injection 1-6 Units, 1-6 Units, Subcutaneous, HS, Sarah Sanchez PA-C, 2 Units at 04/02/21 2235    insulin lispro (HumaLOG) 100 units/mL subcutaneous injection 3 Units, 3 Units, Subcutaneous, TID With Meals, Silvia Orellana MD, 3 Units at 04/02/21 1734    midodrine (PROAMATINE) tablet 10 mg, 10 mg, Oral, Before Dialysis, Hetal Alvarez MD, 10 mg at 04/03/21 1301    ondansetron (ZOFRAN) injection 4 mg, 4 mg, Intravenous, Q6H PRN, Kayode Tanner MD, 4 mg at 03/25/21 1221    oxyCODONE (ROXICODONE) IR tablet 5 mg, 5 mg, Oral, Q6H PRN, Kayode Tanner MD, 5 mg at 04/03/21 1423    Laboratory Results:  Results from last 7 days   Lab Units 04/03/21  0613 04/02/21  0537 04/01/21  0626 03/31/21  1632 03/30/21 2009 03/30/21  0628 03/29/21  0610 03/28/21  0556   WBC Thousand/uL 9 20 9 66 7 99  --   --  7 34  --  10 57*   HEMOGLOBIN g/dL 6 4* 6 8* 6 6*  --  6 6* 6 3*  --  7 2*   HEMATOCRIT % 22 3* 23 3* 23 2*  --  22 2* 21 9*  --  24 8*   PLATELETS Thousands/uL 97* 87* 105*  --   --  170  --  185   SODIUM mmol/L 143 141 142 145  --  145 145 145   POTASSIUM mmol/L 4 6 4 6 4 5 4 8  --  5 2 5 2 5 2   CHLORIDE mmol/L 107 105 104 107  --  107 108 107   CO2 mmol/L 24 23 23 28  --  26 27 26   BUN mg/dL 70* 59* 85* 94*  --  107* 100* 89*   CREATININE mg/dL 4 83* 4 31* 5 65* 5 75*  --  6 16* 5 95* 5 49*   CALCIUM mg/dL 8 4 8 1* 8 3 8 0*  --  7 8* 8 0* 7 9*   MAGNESIUM mg/dL  --   --   --  2 5  --   --   --   --    PHOSPHORUS mg/dL  --   --  4 6* 4 8*  --   --   --   --

## 2021-04-03 NOTE — ASSESSMENT & PLAN NOTE
Lab Results   Component Value Date    HGBA1C 7 6 (H) 03/15/2021       Recent Labs     04/02/21  0739 04/02/21  1121 04/02/21  1644 04/02/21  2136   POCGLU 136 160* 165* 193*       Blood Sugar Average: Last 72 hrs:  · (P) 546 [id number]     Patient has type 2 diabetes on insulin with stage IV chronic disease  Continue Humalog 3 units before meals, and Lantus 25 units subcu daily

## 2021-04-03 NOTE — NURSING NOTE
Pt ambulated to the bathroom assisted by daughter with walker   Pt's oxygen did not reach the bathroom and was caught underneath the bed, pt continued to the bathroom  On the way back from the bathroom the pt made it half way and fell to the ground   The daughter stated she witnessed the fall and she also assisted her mother to the ground  Nurse was called  Nurse found pt sitting upright next to the bed with walker in front of pt  Pt denied any pain or discomfort  Pt did complain of SOB and asked for the oxygen tubing   Spo2 75% on ra  Pt then removed the oxygen sensor, as she stated the device was bothering her  Nursing supervisor, charge nurse, security were all called to asisst in transferring the pt to the bed  The team arrived  Pt was then placed back into bed  MD Sobia Reinoso was informed

## 2021-04-03 NOTE — PLAN OF CARE
Problem: Potential for Falls  Goal: Patient will remain free of falls  Description: INTERVENTIONS:  - Assess patient frequently for physical needs  -  Identify cognitive and physical deficits and behaviors that affect risk of falls    -  [place] fall precautions as indicated by assessment   - Educate patient/family on patient safety including physical limitations  - Instruct patient to call for assistance with activity based on assessment  - Modify environment to reduce risk of injury  - Consider OT/PT consult to assist with strengthening/mobility  Outcome: Progressing     Problem: PAIN - ADULT  Goal: Verbalizes/displays adequate comfort level or baseline comfort level  Description: Interventions:  - Encourage patient to monitor pain and request assistance  - Assess pain using appropriate pain scale  - Administer analgesics based on type and severity of pain and evaluate response  - Implement non-pharmacological measures as appropriate and evaluate response  - Consider cultural and social influences on pain and pain management  - Notify physician/advanced practitioner if interventions unsuccessful or patient reports new pain  Outcome: Progressing     Problem: INFECTION - ADULT  Goal: Absence or prevention of progression during hospitalization  Description: INTERVENTIONS:  - Assess and monitor for signs and symptoms of infection  - Monitor lab/diagnostic results  - Monitor all insertion sites, i e  indwelling lines, tubes, and drains  - Monitor endotracheal if appropriate and nasal secretions for changes in amount and color  - [place] appropriate cooling/warming therapies per order  - Administer medications as ordered  - Instruct and encourage patient and family to use good hand hygiene technique  - Identify and instruct in appropriate isolation precautions for identified infection/condition  Outcome: Progressing  Goal: Absence of fever/infection during neutropenic period  Description: INTERVENTIONS:  - Monitor WBC    Outcome: Progressing     Problem: SAFETY ADULT  Goal: Patient will remain free of falls  Description: INTERVENTIONS:  - Assess patient frequently for physical needs  -  Identify cognitive and physical deficits and behaviors that affect risk of falls    -  [place] fall precautions as indicated by assessment   - Educate patient/family on patient safety including physical limitations  - Instruct patient to call for assistance with activity based on assessment  - Modify environment to reduce risk of injury  - Consider OT/PT consult to assist with strengthening/mobility  Outcome: Progressing  Goal: Maintain or return to baseline ADL function  Description: INTERVENTIONS:  -  Assess patient's ability to carry out ADLs; assess patient's baseline for ADL function and identify physical deficits which impact ability to perform ADLs (bathing, care of mouth/teeth, toileting, grooming, dressing, etc )  - Assess/evaluate cause of self-care deficits   - Assess range of motion  - Assess patient's mobility; develop plan if impaired  - Assess patient's need for assistive devices and provide as appropriate  - Encourage maximum independence but intervene and supervise when necessary  - Involve family in performance of ADLs  - Assess for home care needs following discharge   - Consider OT consult to assist with ADL evaluation and planning for discharge  - Provide patient education as appropriate  Outcome: Progressing  Goal: Maintain or return mobility status to optimal level  Description: INTERVENTIONS:  - Assess patient's baseline mobility status (ambulation, transfers, stairs, etc )    - Identify cognitive and physical deficits and behaviors that affect mobility  - Identify mobility aids required to assist with transfers and/or ambulation (gait belt, sit-to-stand, lift, walker, cane, etc )  - [place] fall precautions as indicated by assessment  - Record patient progress and toleration of activity level on Mobility SBAR; progress patient to next Phase/Stage  - Instruct patient to call for assistance with activity based on assessment  - Consider rehabilitation consult to assist with strengthening/weightbearing, etc   Outcome: Progressing     Problem: DISCHARGE PLANNING  Goal: Discharge to home or other facility with appropriate resources  Description: INTERVENTIONS:  - Identify barriers to discharge w/patient and caregiver  - Arrange for needed discharge resources and transportation as appropriate  - Identify discharge learning needs (meds, wound care, etc )  - Arrange for interpretive services to assist at discharge as needed  - Refer to Case Management Department for coordinating discharge planning if the patient needs post-hospital services based on physician/advanced practitioner order or complex needs related to functional status, cognitive ability, or social support system  Outcome: Progressing     Problem: Knowledge Deficit  Goal: Patient/family/caregiver demonstrates understanding of disease process, treatment plan, medications, and discharge instructions  Description: Complete learning assessment and assess knowledge base    Interventions:  - Provide teaching at level of understanding  - Provide teaching via preferred learning methods  Outcome: Progressing     Problem: CARDIOVASCULAR - ADULT  Goal: Maintains optimal cardiac output and hemodynamic stability  Description: INTERVENTIONS:  - Monitor I/O, vital signs and rhythm  - Monitor for S/S and trends of decreased cardiac output  - Administer and titrate ordered vasoactive medications to optimize hemodynamic stability  - Assess quality of pulses, skin color and temperature  - Assess for signs of decreased coronary artery perfusion  - Instruct patient to report change in severity of symptoms  Outcome: Progressing  Goal: Absence of cardiac dysrhythmias or at baseline rhythm  Description: INTERVENTIONS:  - Continuous cardiac monitoring, vital signs, obtain 12 lead EKG if ordered  - Administer antiarrhythmic and heart rate control medications as ordered  - Monitor electrolytes and administer replacement therapy as ordered  Outcome: Progressing     Problem: SKIN/TISSUE INTEGRITY - ADULT  Goal: Skin integrity remains intact  Description: INTERVENTIONS  - Identify patients at risk for skin breakdown  - Assess and monitor skin integrity  - Assess and monitor nutrition and hydration status  - Monitor labs (i e  albumin)  - Assess for incontinence   - Turn and reposition patient  - Assist with mobility/ambulation  - Relieve pressure over bony prominences  - Avoid friction and shearing  - Provide appropriate hygiene as needed including keeping skin clean and dry  - Evaluate need for skin moisturizer/barrier cream  - Collaborate with interdisciplinary team (i e  Nutrition, Rehabilitation, etc )   - Patient/family teaching  Outcome: Progressing  Goal: Incision(s), wounds(s) or drain site(s) healing without S/S of infection  Description: INTERVENTIONS  - Assess and document risk factors for skin impairment   - Assess and document dressing, incision, wound bed, drain sites and surrounding tissue  - Consider nutrition services referral as needed  - Oral mucous membranes remain intact  - Provide patient/ family education  Outcome: Progressing  Goal: Oral mucous membranes remain intact  Description: INTERVENTIONS  - Assess oral mucosa and hygiene practices  - Implement preventative oral hygiene regimen  - Implement oral medicated treatments as ordered  - Initiate Nutrition services referral as needed  Outcome: Progressing     Problem: RESPIRATORY - ADULT  Goal: Achieves optimal ventilation and oxygenation  Description: INTERVENTIONS:  - Assess for changes in respiratory status  - Assess for changes in mentation and behavior  - Position to facilitate oxygenation and minimize respiratory effort  - Oxygen administered by appropriate delivery if ordered  - Initiate smoking cessation education as indicated  - Encourage broncho-pulmonary hygiene including cough, deep breathe, Incentive Spirometry  - Assess the need for suctioning and aspirate as needed  - Assess and instruct to report SOB or any respiratory difficulty  - Respiratory Therapy support as indicated  Outcome: Progressing     Problem: METABOLIC, FLUID AND ELECTROLYTES - ADULT  Goal: Electrolytes maintained within normal limits  Description: INTERVENTIONS:  - Monitor labs and assess patient for signs and symptoms of electrolyte imbalances  - Administer electrolyte replacement as ordered  - Monitor response to electrolyte replacements, including repeat lab results as appropriate  - Instruct patient on fluid and nutrition as appropriate  Outcome: Progressing  Goal: Fluid balance maintained  Description: INTERVENTIONS:  - Monitor labs   - Monitor I/O and WT  - Instruct patient on fluid and nutrition as appropriate  - Assess for signs & symptoms of volume excess or deficit  Outcome: Progressing  Goal: Glucose maintained within target range  Description: INTERVENTIONS:  - Monitor Blood Glucose as ordered  - Assess for signs and symptoms of hyperglycemia and hypoglycemia  - Administer ordered medications to maintain glucose within target range  - Assess nutritional intake and initiate nutrition service referral as needed  Outcome: Progressing     Problem: HEMATOLOGIC - ADULT  Goal: Maintains hematologic stability  Description: INTERVENTIONS  - Assess for signs and symptoms of bleeding or hemorrhage  - Monitor labs  - Administer supportive blood products/factors as ordered and appropriate  Outcome: Progressing     Problem: MUSCULOSKELETAL - ADULT  Goal: Maintain or return mobility to safest level of function  Description: INTERVENTIONS:  - Assess patient's ability to carry out ADLs; assess patient's baseline for ADL function and identify physical deficits which impact ability to perform ADLs (bathing, care of mouth/teeth, toileting, grooming, dressing, etc )  - Assess/evaluate cause of self-care deficits   - Assess range of motion  - Assess patient's mobility  - Assess patient's need for assistive devices and provide as appropriate  - Encourage maximum independence but intervene and supervise when necessary  - Involve family in performance of ADLs  - Assess for home care needs following discharge   - Consider OT consult to assist with ADL evaluation and planning for discharge  - Provide patient education as appropriate  Outcome: Progressing     Problem: Prexisting or High Potential for Compromised Skin Integrity  Goal: Skin integrity is maintained or improved  Description: INTERVENTIONS:  - Identify patients at risk for skin breakdown  - Assess and monitor skin integrity  - Assess and monitor nutrition and hydration status  - Monitor labs   - Assess for incontinence   - Turn and reposition patient  - Assist with mobility/ambulation  - Relieve pressure over bony prominences  - Avoid friction and shearing  - Provide appropriate hygiene as needed including keeping skin clean and dry  - Evaluate need for skin moisturizer/barrier cream  - Collaborate with interdisciplinary team   - Patient/family teaching  - Consider wound care consult   Outcome: Progressing     Problem: Nutrition/Hydration-ADULT  Goal: Nutrient/Hydration intake appropriate for improving, restoring or maintaining nutritional needs  Description: Monitor and assess patient's nutrition/hydration status for malnutrition  Collaborate with interdisciplinary team and initiate plan and interventions as ordered  Monitor patient's weight and dietary intake as ordered or per policy  Utilize nutrition screening tool and intervene as necessary  Determine patient's food preferences and provide high-protein, high-caloric foods as appropriate       INTERVENTIONS:  - Monitor oral intake, urinary output, labs, and treatment plans  - Assess nutrition and hydration status and recommend course of action  - Evaluate amount of meals eaten  - Assist patient with eating if necessary   - Allow adequate time for meals  - Recommend/ encourage appropriate diets, oral nutritional supplements, and vitamin/mineral supplements  - Order, calculate, and assess calorie counts as needed  - Recommend, monitor, and adjust tube feedings and TPN/PPN based on assessed needs  - Assess need for intravenous fluids  - Provide specific nutrition/hydration education as appropriate  - Include patient/family/caregiver in decisions related to nutrition  Outcome: Progressing

## 2021-04-03 NOTE — PROGRESS NOTES
[place]  Progress Note - Daksha Harris 1955, 60 y o  female MRN: [id number]  Unit/Bed#: -01 Encounter: [id number]  Primary Care Provider: Barrett Boas, DO   Date and time admitted to hospital: 3/15/2021  7:09 PM    Foot ulcer, left ([address] )  Assessment & Plan  · Patient with ulcer on left heel of the foot  · Patient is unaware when ulcer started but notes the rash on her lower extremities began about 6 weeks ago  · Patient reports increased pain with touch and walking over the past week  · Patient with poor compliance of diabetes insulin  · Patient completed the course of cefepime  · XR left foot completed on 03/15 with calcaneal spurring, no acute osseous abnormality  · Podiatry consult and recommendations appreciated  · Wound care and dressing changes per Podiatry recommendations    Acute kidney injury superimposed on chronic kidney disease [facility])  Assessment & Plan  Lab Results   Component Value Date    EGFR 9 04/03/2021    EGFR 10 04/02/2021    EGFR 7 04/01/2021    CREATININE 4 83 (H) 04/03/2021    CREATININE 4 31 (H) 04/02/2021    CREATININE 5 65 (H) 04/01/2021     Patient has stage IV chronic disease with baseline creatinine between 1 49-2  15  She presented with acute kidney injury  She has nephrotic range proteinuria  Nephrology was consulted  Patient underwent renal biopsy on March 25th  She denies any abdominal pain or signs of bleeding  Patient creatinine was 6 16 on 03/30  Creatinine this morning is 4 83  Patient was on Bumex drip which was discontinued by Nephrology  Renal biopsy results showed severe nodular diabetic glomerular sclerosis with severe tubular atrophy and interstitial fibrosis with severe arterial sclerosis and atherosclerosis with hyalinosis    Patient had dialysis catheter placed by IR and underwent dialysis yesterday on 03/30/2021 and 03/31/2021 and 4/01/2021  Patient is set up for dialysis as outpatient with Janet Flanagan on Tuesday, Thursday and Saturday  COVID test is ordered  Patient will be getting dialyzed today  On 3 L of supplemental oxygen  Wean as tolerated  Chest x-ray shows increased vascular congestion  Family requested patient to be reevaluated by PT OT and PT saw patient today and recommended skilled nursing rehab  Will await case management for skilled nursing rehab placement    Non compliance w medication regimen  Assessment & Plan  · Patient reports she stopped taking her insulin around Thanksgiving 2020, her torsemide about 1 week ago and has not been seeing wound care for her legs,   · Patient had been seeing wound care for wounds of her bilateral lower extremities but stopped going due to the fear of jennifer Covid-19  · Consult case management    Cellulitis of both lower extremities  Assessment & Plan  Patient admitted with bilateral lower extremity cellulitis and was treated with IV cefepime and vancomycin  Cellulitis resolved  Anemia due to stage 4 chronic kidney disease [facility])  Assessment & Plan  Patient has normocytic anemia  Iron studies revealed anemia of chronic disease  Patient denies any signs of bleeding  Patient declined blood transfusion earlier this this week when hemoglobin was 6 7  Discussed with patient and she had declined blood transfusion and wanted to monitor the H&H  Hemoglobin this morning is 6 4  Discussed with patient and she is in agreement for transfusion this morning  Will order 1 unit of packed red cells transfusion to be given with dialysis  No active or overt rectal bleeding  Patient received Venofer  She has no abdominal tenderness and denies abdominal pain    Patient is on subcutaneous heparin for DVT prevention purposes    Type 2 diabetes mellitus with stage 4 chronic kidney disease, with long-term current use of insulin [facility])  Assessment & Plan  Lab Results   Component Value Date    HGBA1C 7 6 (H) 03/15/2021       Recent Labs     04/02/21  0739 04/02/21  1121 04/02/21  1644 04/02/21  2136   POCGLU 136 160* 165* 193*       Blood Sugar Average: Last 72 hrs:  · (P) 125 [id number]     Patient has type 2 diabetes on insulin with stage IV chronic disease  Continue Humalog 3 units before meals, and Lantus 25 units subcu daily   Coronary artery disease involving native coronary artery of native heart without angina pectoris  Assessment & Plan  Patient has coronary disease and she status post stent placements in 2018  She denies any chest pain or shortness of breath currently  Continue on aspirin and Plavix  Continue atorvastatin    Essential hypertension  Assessment & Plan  · Continue home carvedilol 25 mg b i d  · Hydralazine was discontinued  · Continue to monitor blood pressure per unit protocol    Class 3 severe obesity with body mass index (BMI) of 45 0 to 49 9 in adult [facility])  Assessment & Plan  Body mass index is 47 65 kg/m²  · Encourage lifestyle changes  · Consult nutrition    * Acute on chronic combined systolic and diastolic congestive heart failure (HCC)  Assessment & Plan  Wt Readings from Last 3 Encounters:   04/02/21 126 kg (277 lb 9 6 oz)   02/21/20 117 kg (258 lb)   01/07/20 117 kg (258 lb 12 8 oz)     Patient was admitted with increased lower extremity edema due to acute systolic CHF due to noncompliance with medications  Ejection fraction was 45-50% on this admission  Patient was treated with IV Bumex then IV diuretics were held because of worsening renal function then restarted on 3/26/2021 due to increasing lower extremity edema and oxygen requirement   Lungs are clear to auscultation but patient has worsening lower extremity edema  Patient was switched to Bumex drip on 03/29  Bumex drip was discontinued on 03/30 Daily weight and I&Os  Continue on dialysis per Nephrology  Patient underwent dialysis for 3 days Tu-Wed-THu  Scheduled for dialysis today  Labs & Imaging: I have personally reviewed pertinent reports        VTE Pharmacologic Prophylaxis: Heparin  VTE Mechanical Prophylaxis: sequential compression device    Code Status:   Level 1 - Full Code    Patient Centered Rounds: I have performed bedside rounds with nursing staff today  Discussions with Specialists or Other Care Team Provider:  Nephrology and CM    Education and Discussions with Family / Patient:  Patient states she will update her daughter  I offered to call    Current Length of Stay: 19 day(s)    Current Patient Status: Inpatient   Certification Statement: The patient will continue to require additional inpatient hospital stay due to see my assessment and plan  Subjective:   Patient is seen and examined at bedside  On 4 L of supplemental oxygen  Has shortness of breath  Denies any new complaints  Afebrile  No nausea, vomiting, abdominal pain, chest pain  All other ROS are negative  Objective:    Vitals: Blood pressure 121/56, pulse 64, temperature 98 4 °F (36 9 °C), temperature source Oral, resp  rate 18, height 5' 4" (1 626 m), weight 126 kg (277 lb 9 6 oz), SpO2 96 %, not currently breastfeeding  ,Body mass index is 47 65 kg/m²  SPO2 RA Rest      ED to Hosp-Admission (Current) from 3/15/2021 in [address] Med Surg Unit   SpO2  96 %   SpO2 Activity  During Exercise [pt only able to tolerate 2 minutes]   O2 Device  None (Room air)   O2 Flow Rate  --        I&O:     Intake/Output Summary (Last 24 hours) at 4/3/2021 0659  Last data filed at 4/3/2021 0354  Gross per 24 hour   Intake 281 ml   Output --   Net 281 ml       Physical Exam:    General- Alert, sitting comfortably in chair  Not in any acute distress  Neck- Supple, No JVD  CVS- regular, S1 and S2 normal  Chest- Bilateral Air entry, fine basilar rales present  Abdomen- soft, nontender, not distended, no guarding or rigidity, BS+  Extremities-  No pedal edema, No calf tenderness  Bilateral lower extremity dressing in place  CNS-   Alert, awake and orientedx3  No focal deficits present      Invasive Devices     Peripheral Intravenous Line            Peripheral IV 04/01/21 Dorsal (posterior); Left Wrist 2 days          Hemodialysis Catheter            HD Permanent Double Catheter 3 days                      Social History  reviewed  History reviewed  No pertinent family history   reviewed    Meds:  Current Facility-Administered Medications   Medication Dose Route Frequency Provider Last Rate Last Admin    acetaminophen (TYLENOL) tablet 650 mg  650 mg Oral Q6H PRN Victor Manuel Elizabeth PA-C   650 mg at 03/17/21 2320    aspirin (ECOTRIN LOW STRENGTH) EC tablet 81 mg  81 mg Oral Daily Ab Mendosa MD   81 mg at 04/02/21 0847    atorvastatin (LIPITOR) tablet 40 mg  40 mg Oral Daily Sarah Sanchez PA-C   40 mg at 04/02/21 0847    calcium acetate (PHOSLO) capsule 667 mg  667 mg Oral TID With Meals HOWARD Snachez   667 mg at 04/02/21 1734    carvedilol (COREG) tablet 12 5 mg  12 5 mg Oral BID Ama Hensley MD   12 5 mg at 04/02/21 1734    clopidogrel (PLAVIX) tablet 75 mg  75 mg Oral Daily Ab Mendosa MD   75 mg at 04/02/21 0847    dextrose 50 % IV solution 25 mL  25 mL Intravenous Once Sukhdev Mcgowan,         diphenhydrAMINE (BENADRYL) tablet 25 mg  25 mg Oral Q6H PRN Victor Manuel Elizabeth PA-C   25 mg at 04/01/21 0353    epoetin cedric (EPOGEN,PROCRIT) injection 10,000 Units  10,000 Units Intravenous After Dialysis Ama Hensley MD   10,000 Units at 04/01/21 1353    epoetin cedric (EPOGEN,PROCRIT) injection 10,000 Units  10,000 Units Intravenous After Dialysis Ama Hensley MD        heparin (porcine) subcutaneous injection 5,000 Units  5,000 Units Subcutaneous Replaced by [facility] Domenic Alexander MD   5,000 Units at 04/03/21 0625    insulin glargine (LANTUS) subcutaneous injection 25 Units 0 25 mL  25 Units Subcutaneous QAM Domenic Alexander MD   25 Units at 04/02/21 0847    insulin lispro (HumaLOG) 100 units/mL subcutaneous injection 1-6 Units  1-6 Units Subcutaneous TID AC Sarah Sanchez PA-C   1 Units at 04/02/21 1734    insulin lispro (HumaLOG) 100 units/mL subcutaneous injection 1-6 Units  1-6 Units Subcutaneous HS Dotkatie AMBER Todd   2 Units at 04/02/21 2235    insulin lispro (HumaLOG) 100 units/mL subcutaneous injection 3 Units  3 Units Subcutaneous TID With Meals Silvia Orellana MD   3 Units at 04/02/21 1734    iron sucrose (VENOFER) 100 mg in sodium chloride 0 9 % 100 mL IVPB  100 mg Intravenous Once in dialysis Christian Lam MD        midodrine (PROAMATINE) tablet 10 mg  10 mg Oral Before Dialysis Christian Lam MD        ondansetron [facility]) injection 4 mg  4 mg Intravenous Q6H PRN Danielle Francisco MD   4 mg at 03/25/21 1221    oxyCODONE (ROXICODONE) IR tablet 5 mg  5 mg Oral Q6H PRN Danielle Francisco MD   5 mg at 04/03/21 0258      Medications Prior to Admission   Medication    aspirin (ECOTRIN LOW STRENGTH) 81 mg EC tablet    atorvastatin (LIPITOR) 40 mg tablet    carvedilol (COREG) 25 mg tablet    clopidogrel (PLAVIX) 75 mg tablet    hydrALAZINE (APRESOLINE) 50 mg tablet    torsemide (DEMADEX) 20 mg tablet    amLODIPine (NORVASC) 2 5 mg tablet    insulin glargine (Toujeo SoloStar) 300 units/mL CONCETRATED U-300 injection pen (1-unit dial)    nitroglycerin (NITROSTAT) 0 4 mg SL tablet       Labs:  Results from last 7 days   Lab Units 04/03/21  0613 04/02/21  0537 04/01/21  0626   WBC Thousand/uL 9 20 9 66 7 99   HEMOGLOBIN g/dL 6 4* 6 8* 6 6*   HEMATOCRIT % 22 3* 23 3* 23 2*   PLATELETS Thousands/uL 97* 87* 105*   NEUTROS PCT % 72 71 70   LYMPHS PCT % 9* 9* 11*   MONOS PCT % 7 8 7   EOS PCT % 12* 11* 11*     Results from last 7 days   Lab Units 04/03/21  0613 04/02/21  0537 04/01/21  0626   POTASSIUM mmol/L 4 6 4 6 4 5   CHLORIDE mmol/L 107 105 104   CO2 mmol/L 24 23 23   BUN mg/dL 70* 59* 85*   CREATININE mg/dL 4 83* 4 31* 5 65*   CALCIUM mg/dL 8 4 8 1* 8 3     Lab Results   Component Value Date    TROPONINI <0 02 03/15/2021    TROPONINI 0 02 01/07/2020    TROPONINI <0 02 05/11/2017    CKTOTAL 53 04/07/2014         Lab Results Component Value Date    BLOODCX No Growth After 5 Days  03/15/2021    BLOODCX No Growth After 5 Days  03/15/2021    URINECX <10,000 cfu/ml  03/16/2021         Imaging:  Results for orders placed during the hospital encounter of 03/15/21   XR chest portable    Narrative CHEST     INDICATION:   sob  COMPARISON:  3/15/2021    EXAM PERFORMED/VIEWS:  XR CHEST PORTABLE  Single view    FINDINGS:  Increased vascular congestion  Persistent cardiomegaly  No localized pulmonary mass or infiltrate  Right IJ dialysis catheter has been placed, terminating at cavoatrial junction  No pneumothorax or pleural effusion  Osseous structures appear within normal limits for patient age  Impression Increased vascular congestion    Typical right IJ dialysis catheter          Workstation performed: [id number]       Results for orders placed during the hospital encounter of 01/07/20   XR chest 2 views    Narrative CHEST     INDICATION:   Chest Pain  COMPARISON:  5/11/2017    EXAM PERFORMED/VIEWS:  XR CHEST PA & LATERAL      FINDINGS:    Cardiomediastinal silhouette appears unremarkable  Crowding of the pulmonary markings secondary to shallow inspiration  Grossly clear lungs  No pneumothorax or pleural effusion  Osseous structures appear within normal limits for patient age  Impression No acute cardiopulmonary disease          Workstation performed: [id number]         Last 24 Hours Medication List:   Current Facility-Administered Medications   Medication Dose Route Frequency Provider Last Rate    acetaminophen  650 mg Oral Q6H PRN Zaida Lizarraga PA-C      aspirin  81 mg Oral Daily Patricia Cruz MD      atorvastatin  40 mg Oral Daily Zaida Lizarraga PA-C      calcium acetate  667 mg Oral TID With Meals HOWARD Grady      carvedilol  12 5 mg Oral BID Gilda Odonnell MD      clopidogrel  75 mg Oral Daily Patricia Cruz MD      dextrose  25 mL Intravenous Once Adamaris Carmona DO      diphenhydrAMINE  25 mg Oral Q6H PRN David Martinez PA-C      epoetin cedric  10,000 Units Intravenous After Dialysis Ar Goodwin MD      epoetin cedric  10,000 Units Intravenous After Dialysis Ar Goodwin MD      heparin (porcine)  5,000 Units Subcutaneous [facility] Radha Chatman MD      insulin glargine  25 Units Subcutaneous QAM Radha Chatman MD      insulin lispro  1-6 Units Subcutaneous TID AC Sarah Sanchez PA-C      insulin lispro  1-6 Units Subcutaneous HS Sarah Sanchez PA-C      insulin lispro  3 Units Subcutaneous TID With Meals Radha Chatman MD      iron sucrose  100 mg Intravenous Once in dialysis Ar Goodwin MD      midodrine  10 mg Oral Before Dialysis Ar Goodwin MD      ondansetron  4 mg Intravenous Q6H PRN Jose Ramon Rebolledo MD      oxyCODONE  5 mg Oral Q6H PRN Jose Ramon Rebolledo MD          Today, Patient Was Seen By: Jose Ramon Rebolledo MD    ** Please Note: Dictation voice to text software may have been used in the creation of this document   **

## 2021-04-03 NOTE — ASSESSMENT & PLAN NOTE
· Continue home carvedilol 25 mg b i d  · Hydralazine was discontinued  · Continue to monitor blood pressure per unit protocol

## 2021-04-04 LAB
ABO GROUP BLD BPU: NORMAL
ANION GAP SERPL CALCULATED.3IONS-SCNC: 10 MMOL/L (ref 4–13)
BASOPHILS # BLD AUTO: 0.04 THOUSANDS/ΜL (ref 0–0.1)
BASOPHILS NFR BLD AUTO: 0 % (ref 0–1)
BPU ID: NORMAL
BUN SERPL-MCNC: 42 MG/DL (ref 5–25)
CALCIUM SERPL-MCNC: 8.4 MG/DL (ref 8.3–10.1)
CHLORIDE SERPL-SCNC: 103 MMOL/L (ref 100–108)
CO2 SERPL-SCNC: 27 MMOL/L (ref 21–32)
CREAT SERPL-MCNC: 3.65 MG/DL (ref 0.6–1.3)
CROSSMATCH: NORMAL
EOSINOPHIL # BLD AUTO: 1.11 THOUSAND/ΜL (ref 0–0.61)
EOSINOPHIL NFR BLD AUTO: 13 % (ref 0–6)
ERYTHROCYTE [DISTWIDTH] IN BLOOD BY AUTOMATED COUNT: 17.8 % (ref 11.6–15.1)
GFR SERPL CREATININE-BSD FRML MDRD: 12 ML/MIN/1.73SQ M
GLUCOSE SERPL-MCNC: 135 MG/DL (ref 65–140)
GLUCOSE SERPL-MCNC: 151 MG/DL (ref 65–140)
GLUCOSE SERPL-MCNC: 162 MG/DL (ref 65–140)
GLUCOSE SERPL-MCNC: 184 MG/DL (ref 65–140)
GLUCOSE SERPL-MCNC: 190 MG/DL (ref 65–140)
HCT VFR BLD AUTO: 24.8 % (ref 34.8–46.1)
HGB BLD-MCNC: 7.3 G/DL (ref 11.5–15.4)
IMM GRANULOCYTES # BLD AUTO: 0.05 THOUSAND/UL (ref 0–0.2)
IMM GRANULOCYTES NFR BLD AUTO: 1 % (ref 0–2)
LYMPHOCYTES # BLD AUTO: 0.63 THOUSANDS/ΜL (ref 0.6–4.47)
LYMPHOCYTES NFR BLD AUTO: 7 % (ref 14–44)
MCH RBC QN AUTO: 29.3 PG (ref 26.8–34.3)
MCHC RBC AUTO-ENTMCNC: 29.4 G/DL (ref 31.4–37.4)
MCV RBC AUTO: 100 FL (ref 82–98)
MONOCYTES # BLD AUTO: 0.58 THOUSAND/ΜL (ref 0.17–1.22)
MONOCYTES NFR BLD AUTO: 7 % (ref 4–12)
NEUTROPHILS # BLD AUTO: 6.48 THOUSANDS/ΜL (ref 1.85–7.62)
NEUTS SEG NFR BLD AUTO: 72 % (ref 43–75)
NRBC BLD AUTO-RTO: 0 /100 WBCS
PLATELET # BLD AUTO: 96 THOUSANDS/UL (ref 149–390)
PMV BLD AUTO: 10.6 FL (ref 8.9–12.7)
POTASSIUM SERPL-SCNC: 3.8 MMOL/L (ref 3.5–5.3)
RBC # BLD AUTO: 2.49 MILLION/UL (ref 3.81–5.12)
SODIUM SERPL-SCNC: 140 MMOL/L (ref 136–145)
UNIT DISPENSE STATUS: NORMAL
UNIT PRODUCT CODE: NORMAL
UNIT RH: NORMAL
WBC # BLD AUTO: 8.89 THOUSAND/UL (ref 4.31–10.16)

## 2021-04-04 PROCEDURE — 82948 REAGENT STRIP/BLOOD GLUCOSE: CPT

## 2021-04-04 PROCEDURE — NC001 PR NO CHARGE: Performed by: INTERNAL MEDICINE

## 2021-04-04 PROCEDURE — 85025 COMPLETE CBC W/AUTO DIFF WBC: CPT | Performed by: INTERNAL MEDICINE

## 2021-04-04 PROCEDURE — 99232 SBSQ HOSP IP/OBS MODERATE 35: CPT | Performed by: INTERNAL MEDICINE

## 2021-04-04 PROCEDURE — 80048 BASIC METABOLIC PNL TOTAL CA: CPT | Performed by: INTERNAL MEDICINE

## 2021-04-04 RX ADMIN — INSULIN LISPRO 1 UNITS: 100 INJECTION, SOLUTION INTRAVENOUS; SUBCUTANEOUS at 21:30

## 2021-04-04 RX ADMIN — HEPARIN SODIUM 5000 UNITS: 5000 INJECTION INTRAVENOUS; SUBCUTANEOUS at 21:31

## 2021-04-04 RX ADMIN — CALCIUM ACETATE 667 MG: 667 CAPSULE ORAL at 12:36

## 2021-04-04 RX ADMIN — INSULIN LISPRO 3 UNITS: 100 INJECTION, SOLUTION INTRAVENOUS; SUBCUTANEOUS at 17:06

## 2021-04-04 RX ADMIN — CALCIUM ACETATE 667 MG: 667 CAPSULE ORAL at 17:06

## 2021-04-04 RX ADMIN — HEPARIN SODIUM 5000 UNITS: 5000 INJECTION INTRAVENOUS; SUBCUTANEOUS at 12:36

## 2021-04-04 RX ADMIN — CALCIUM ACETATE 667 MG: 667 CAPSULE ORAL at 10:11

## 2021-04-04 RX ADMIN — INSULIN LISPRO 1 UNITS: 100 INJECTION, SOLUTION INTRAVENOUS; SUBCUTANEOUS at 12:37

## 2021-04-04 RX ADMIN — CLOPIDOGREL BISULFATE 75 MG: 75 TABLET ORAL at 10:10

## 2021-04-04 RX ADMIN — INSULIN LISPRO 3 UNITS: 100 INJECTION, SOLUTION INTRAVENOUS; SUBCUTANEOUS at 12:37

## 2021-04-04 RX ADMIN — ATORVASTATIN CALCIUM 40 MG: 40 TABLET, FILM COATED ORAL at 10:10

## 2021-04-04 RX ADMIN — CARVEDILOL 12.5 MG: 12.5 TABLET, FILM COATED ORAL at 10:10

## 2021-04-04 RX ADMIN — HEPARIN SODIUM 5000 UNITS: 5000 INJECTION INTRAVENOUS; SUBCUTANEOUS at 05:55

## 2021-04-04 RX ADMIN — ASPIRIN 81 MG: 81 TABLET, COATED ORAL at 10:10

## 2021-04-04 RX ADMIN — CARVEDILOL 12.5 MG: 12.5 TABLET, FILM COATED ORAL at 17:06

## 2021-04-04 RX ADMIN — ACETAMINOPHEN 650 MG: 325 TABLET, FILM COATED ORAL at 03:31

## 2021-04-04 RX ADMIN — INSULIN LISPRO 1 UNITS: 100 INJECTION, SOLUTION INTRAVENOUS; SUBCUTANEOUS at 17:06

## 2021-04-04 NOTE — ASSESSMENT & PLAN NOTE
Wt Readings from Last 3 Encounters:   04/02/21 126 kg (277 lb 9 6 oz)   02/21/20 117 kg (258 lb)   01/07/20 117 kg (258 lb 12 8 oz)     Patient was admitted with increased lower extremity edema due to acute systolic CHF due to noncompliance with medications  Ejection fraction was 45-50% on this admission  Patient was treated with IV Bumex then IV diuretics were held because of worsening renal function then restarted on 3/26/2021 due to increasing lower extremity edema and oxygen requirement   Lungs are clear to auscultation but patient has worsening lower extremity edema  Patient was switched to Bumex drip on 03/29  Bumex drip was discontinued on 03/30 Daily weight and I&Os  Continue on dialysis per Nephrology  Patient underwent dialysis for 3 days Tu-Wed-THu    Patient underwent dialysis on Saturday 4/3

## 2021-04-04 NOTE — NURSING NOTE
Pt was asked if wound care and CHG bath could be completed, and the pt stated not now and she would let us know when she would want to be bathed  Nurse complied, and reminded the pt about the importance of both wound care and a CHG bath

## 2021-04-04 NOTE — PLAN OF CARE
Problem: Potential for Falls  Goal: Patient will remain free of falls  Description: INTERVENTIONS:  - Assess patient frequently for physical needs  -  Identify cognitive and physical deficits and behaviors that affect risk of falls    -  [place] fall precautions as indicated by assessment   - Educate patient/family on patient safety including physical limitations  - Instruct patient to call for assistance with activity based on assessment  - Modify environment to reduce risk of injury  - Consider OT/PT consult to assist with strengthening/mobility  Outcome: Progressing

## 2021-04-04 NOTE — PLAN OF CARE
Problem: Potential for Falls  Goal: Patient will remain free of falls  Description: INTERVENTIONS:  - Assess patient frequently for physical needs  -  Identify cognitive and physical deficits and behaviors that affect risk of falls    -  [place] fall precautions as indicated by assessment   - Educate patient/family on patient safety including physical limitations  - Instruct patient to call for assistance with activity based on assessment  - Modify environment to reduce risk of injury  - Consider OT/PT consult to assist with strengthening/mobility  Outcome: Progressing     Problem: PAIN - ADULT  Goal: Verbalizes/displays adequate comfort level or baseline comfort level  Description: Interventions:  - Encourage patient to monitor pain and request assistance  - Assess pain using appropriate pain scale  - Administer analgesics based on type and severity of pain and evaluate response  - Implement non-pharmacological measures as appropriate and evaluate response  - Consider cultural and social influences on pain and pain management  - Notify physician/advanced practitioner if interventions unsuccessful or patient reports new pain  Outcome: Progressing     Problem: INFECTION - ADULT  Goal: Absence or prevention of progression during hospitalization  Description: INTERVENTIONS:  - Assess and monitor for signs and symptoms of infection  - Monitor lab/diagnostic results  - Monitor all insertion sites, i e  indwelling lines, tubes, and drains  - Monitor endotracheal if appropriate and nasal secretions for changes in amount and color  - [place] appropriate cooling/warming therapies per order  - Administer medications as ordered  - Instruct and encourage patient and family to use good hand hygiene technique  - Identify and instruct in appropriate isolation precautions for identified infection/condition  Outcome: Progressing  Goal: Absence of fever/infection during neutropenic period  Description: INTERVENTIONS:  - Monitor WBC    Outcome: Progressing     Problem: SAFETY ADULT  Goal: Patient will remain free of falls  Description: INTERVENTIONS:  - Assess patient frequently for physical needs  -  Identify cognitive and physical deficits and behaviors that affect risk of falls    -  [place] fall precautions as indicated by assessment   - Educate patient/family on patient safety including physical limitations  - Instruct patient to call for assistance with activity based on assessment  - Modify environment to reduce risk of injury  - Consider OT/PT consult to assist with strengthening/mobility  Outcome: Progressing  Goal: Maintain or return to baseline ADL function  Description: INTERVENTIONS:  -  Assess patient's ability to carry out ADLs; assess patient's baseline for ADL function and identify physical deficits which impact ability to perform ADLs (bathing, care of mouth/teeth, toileting, grooming, dressing, etc )  - Assess/evaluate cause of self-care deficits   - Assess range of motion  - Assess patient's mobility; develop plan if impaired  - Assess patient's need for assistive devices and provide as appropriate  - Encourage maximum independence but intervene and supervise when necessary  - Involve family in performance of ADLs  - Assess for home care needs following discharge   - Consider OT consult to assist with ADL evaluation and planning for discharge  - Provide patient education as appropriate  Outcome: Progressing  Goal: Maintain or return mobility status to optimal level  Description: INTERVENTIONS:  - Assess patient's baseline mobility status (ambulation, transfers, stairs, etc )    - Identify cognitive and physical deficits and behaviors that affect mobility  - Identify mobility aids required to assist with transfers and/or ambulation (gait belt, sit-to-stand, lift, walker, cane, etc )  - [place] fall precautions as indicated by assessment  - Record patient progress and toleration of activity level on Mobility SBAR; progress patient to next Phase/Stage  - Instruct patient to call for assistance with activity based on assessment  - Consider rehabilitation consult to assist with strengthening/weightbearing, etc   Outcome: Progressing     Problem: DISCHARGE PLANNING  Goal: Discharge to home or other facility with appropriate resources  Description: INTERVENTIONS:  - Identify barriers to discharge w/patient and caregiver  - Arrange for needed discharge resources and transportation as appropriate  - Identify discharge learning needs (meds, wound care, etc )  - Arrange for interpretive services to assist at discharge as needed  - Refer to Case Management Department for coordinating discharge planning if the patient needs post-hospital services based on physician/advanced practitioner order or complex needs related to functional status, cognitive ability, or social support system  Outcome: Progressing     Problem: Knowledge Deficit  Goal: Patient/family/caregiver demonstrates understanding of disease process, treatment plan, medications, and discharge instructions  Description: Complete learning assessment and assess knowledge base    Interventions:  - Provide teaching at level of understanding  - Provide teaching via preferred learning methods  Outcome: Progressing     Problem: CARDIOVASCULAR - ADULT  Goal: Maintains optimal cardiac output and hemodynamic stability  Description: INTERVENTIONS:  - Monitor I/O, vital signs and rhythm  - Monitor for S/S and trends of decreased cardiac output  - Administer and titrate ordered vasoactive medications to optimize hemodynamic stability  - Assess quality of pulses, skin color and temperature  - Assess for signs of decreased coronary artery perfusion  - Instruct patient to report change in severity of symptoms  Outcome: Progressing  Goal: Absence of cardiac dysrhythmias or at baseline rhythm  Description: INTERVENTIONS:  - Continuous cardiac monitoring, vital signs, obtain 12 lead EKG if ordered  - Administer antiarrhythmic and heart rate control medications as ordered  - Monitor electrolytes and administer replacement therapy as ordered  Outcome: Progressing     Problem: SKIN/TISSUE INTEGRITY - ADULT  Goal: Skin integrity remains intact  Description: INTERVENTIONS  - Identify patients at risk for skin breakdown  - Assess and monitor skin integrity  - Assess and monitor nutrition and hydration status  - Monitor labs (i e  albumin)  - Assess for incontinence   - Turn and reposition patient  - Assist with mobility/ambulation  - Relieve pressure over bony prominences  - Avoid friction and shearing  - Provide appropriate hygiene as needed including keeping skin clean and dry  - Evaluate need for skin moisturizer/barrier cream  - Collaborate with interdisciplinary team (i e  Nutrition, Rehabilitation, etc )   - Patient/family teaching  Outcome: Progressing  Goal: Incision(s), wounds(s) or drain site(s) healing without S/S of infection  Description: INTERVENTIONS  - Assess and document risk factors for skin impairment   - Assess and document dressing, incision, wound bed, drain sites and surrounding tissue  - Consider nutrition services referral as needed  - Oral mucous membranes remain intact  - Provide patient/ family education  Outcome: Progressing  Goal: Oral mucous membranes remain intact  Description: INTERVENTIONS  - Assess oral mucosa and hygiene practices  - Implement preventative oral hygiene regimen  - Implement oral medicated treatments as ordered  - Initiate Nutrition services referral as needed  Outcome: Progressing     Problem: RESPIRATORY - ADULT  Goal: Achieves optimal ventilation and oxygenation  Description: INTERVENTIONS:  - Assess for changes in respiratory status  - Assess for changes in mentation and behavior  - Position to facilitate oxygenation and minimize respiratory effort  - Oxygen administered by appropriate delivery if ordered  - Initiate smoking cessation education as indicated  - Encourage broncho-pulmonary hygiene including cough, deep breathe, Incentive Spirometry  - Assess the need for suctioning and aspirate as needed  - Assess and instruct to report SOB or any respiratory difficulty  - Respiratory Therapy support as indicated  Outcome: Progressing     Problem: METABOLIC, FLUID AND ELECTROLYTES - ADULT  Goal: Electrolytes maintained within normal limits  Description: INTERVENTIONS:  - Monitor labs and assess patient for signs and symptoms of electrolyte imbalances  - Administer electrolyte replacement as ordered  - Monitor response to electrolyte replacements, including repeat lab results as appropriate  - Instruct patient on fluid and nutrition as appropriate  Outcome: Progressing  Goal: Fluid balance maintained  Description: INTERVENTIONS:  - Monitor labs   - Monitor I/O and WT  - Instruct patient on fluid and nutrition as appropriate  - Assess for signs & symptoms of volume excess or deficit  Outcome: Progressing  Goal: Glucose maintained within target range  Description: INTERVENTIONS:  - Monitor Blood Glucose as ordered  - Assess for signs and symptoms of hyperglycemia and hypoglycemia  - Administer ordered medications to maintain glucose within target range  - Assess nutritional intake and initiate nutrition service referral as needed  Outcome: Progressing     Problem: HEMATOLOGIC - ADULT  Goal: Maintains hematologic stability  Description: INTERVENTIONS  - Assess for signs and symptoms of bleeding or hemorrhage  - Monitor labs  - Administer supportive blood products/factors as ordered and appropriate  Outcome: Progressing     Problem: MUSCULOSKELETAL - ADULT  Goal: Maintain or return mobility to safest level of function  Description: INTERVENTIONS:  - Assess patient's ability to carry out ADLs; assess patient's baseline for ADL function and identify physical deficits which impact ability to perform ADLs (bathing, care of mouth/teeth, toileting, grooming, dressing, etc )  - Assess/evaluate cause of self-care deficits   - Assess range of motion  - Assess patient's mobility  - Assess patient's need for assistive devices and provide as appropriate  - Encourage maximum independence but intervene and supervise when necessary  - Involve family in performance of ADLs  - Assess for home care needs following discharge   - Consider OT consult to assist with ADL evaluation and planning for discharge  - Provide patient education as appropriate  Outcome: Progressing     Problem: Prexisting or High Potential for Compromised Skin Integrity  Goal: Skin integrity is maintained or improved  Description: INTERVENTIONS:  - Identify patients at risk for skin breakdown  - Assess and monitor skin integrity  - Assess and monitor nutrition and hydration status  - Monitor labs   - Assess for incontinence   - Turn and reposition patient  - Assist with mobility/ambulation  - Relieve pressure over bony prominences  - Avoid friction and shearing  - Provide appropriate hygiene as needed including keeping skin clean and dry  - Evaluate need for skin moisturizer/barrier cream  - Collaborate with interdisciplinary team   - Patient/family teaching  - Consider wound care consult   Outcome: Progressing     Problem: Nutrition/Hydration-ADULT  Goal: Nutrient/Hydration intake appropriate for improving, restoring or maintaining nutritional needs  Description: Monitor and assess patient's nutrition/hydration status for malnutrition  Collaborate with interdisciplinary team and initiate plan and interventions as ordered  Monitor patient's weight and dietary intake as ordered or per policy  Utilize nutrition screening tool and intervene as necessary  Determine patient's food preferences and provide high-protein, high-caloric foods as appropriate       INTERVENTIONS:  - Monitor oral intake, urinary output, labs, and treatment plans  - Assess nutrition and hydration status and recommend course of action  - Evaluate amount of meals eaten  - Assist patient with eating if necessary   - Allow adequate time for meals  - Recommend/ encourage appropriate diets, oral nutritional supplements, and vitamin/mineral supplements  - Order, calculate, and assess calorie counts as needed  - Recommend, monitor, and adjust tube feedings and TPN/PPN based on assessed needs  - Assess need for intravenous fluids  - Provide specific nutrition/hydration education as appropriate  - Include patient/family/caregiver in decisions related to nutrition  Outcome: Progressing

## 2021-04-04 NOTE — PROGRESS NOTES
Patient with DAI on Chronic Kidney Disease now progressed to end-stage renal disease on hemodialysis  Last treatment was yesterday for UF of -1 5  Patient is hemodynamically stable, electrolytes within normal limits, will see patient on Monday for ongoing ER SD management    Please call Nephrology if we can be of further assistance

## 2021-04-04 NOTE — ASSESSMENT & PLAN NOTE
Lab Results   Component Value Date    EGFR 12 04/04/2021    EGFR 9 04/03/2021    EGFR 10 04/02/2021    CREATININE 3 65 (H) 04/04/2021    CREATININE 4 83 (H) 04/03/2021    CREATININE 4 31 (H) 04/02/2021     Patient has stage IV chronic disease with baseline creatinine between 1 49-2  15  She presented with acute kidney injury  She has nephrotic range proteinuria  Nephrology was consulted  Patient underwent renal biopsy on March 25th  She denies any abdominal pain or signs of bleeding  Patient creatinine was 6 16 on 03/30  Creatinine this morning is 3 65  Patient was on Bumex drip which was discontinued by Nephrology when patient was started on dialysis  Renal biopsy results showed severe nodular diabetic glomerular sclerosis with severe tubular atrophy and interstitial fibrosis with severe arterial sclerosis and atherosclerosis with hyalinosis  Patient had dialysis catheter placed by IR and underwent dialysis yesterday on 03/30/2021 and 03/31/2021 and 4/01/2021  Patient is set up for dialysis as outpatient with Giovana Wilson on Tuesday, Thursday and Saturday  COVID test is ordered  Patient got dialyzed on Saturday 4/3  On 4 L of supplemental oxygen  Wean as tolerated  Chest x-ray shows increased vascular congestion  Family requested patient to be reevaluated by PT OT and PT saw patient on 4/3 and recommended skilled nursing rehab    Will await case management for skilled nursing rehab placement

## 2021-04-04 NOTE — ASSESSMENT & PLAN NOTE
· Patient reports she stopped taking her insulin around Thanksgiving 2020, her torsemide about 1 week ago and has not been seeing wound care for her legs,   · Patient had been seeing wound care for wounds of her bilateral lower extremities but stopped going due to the fear of jennifer Covid-19  · Case Management on board

## 2021-04-04 NOTE — ASSESSMENT & PLAN NOTE
Patient has normocytic anemia  Iron studies revealed anemia of chronic disease  Patient denies any signs of bleeding  Patient declined blood transfusion earlier this this week when hemoglobin was 6 7  Discussed with patient and she had declined blood transfusion and wanted to monitor the H&H  Hemoglobin on 04/03 was 6 4  Discussed with patient and she is in agreement for transfusion this morning  Patient received 1 unit of packed red cells transfusion  Hemoglobin this morning is 7 3  No active or overt rectal bleeding  Patient received Venofer  She has no abdominal tenderness and denies abdominal pain    Patient is on subcutaneous heparin for DVT prevention purposes

## 2021-04-04 NOTE — ASSESSMENT & PLAN NOTE
Lab Results   Component Value Date    HGBA1C 7 6 (H) 03/15/2021       Recent Labs     04/03/21  1058 04/03/21  1539 04/03/21  2114 04/04/21  0733   POCGLU 169* 151* 129 151*       Blood Sugar Average: Last 72 hrs:  · (P) 100 [id number]     Patient has type 2 diabetes on insulin with stage IV chronic disease  Continue Humalog 3 units before meals, and Lantus 25 units subcu daily

## 2021-04-05 VITALS
HEIGHT: 64 IN | TEMPERATURE: 98 F | BODY MASS INDEX: 46.54 KG/M2 | DIASTOLIC BLOOD PRESSURE: 66 MMHG | RESPIRATION RATE: 18 BRPM | WEIGHT: 272.6 LBS | HEART RATE: 61 BPM | SYSTOLIC BLOOD PRESSURE: 144 MMHG | OXYGEN SATURATION: 96 %

## 2021-04-05 LAB
ANION GAP SERPL CALCULATED.3IONS-SCNC: 12 MMOL/L (ref 4–13)
BASOPHILS # BLD AUTO: 0.04 THOUSANDS/ΜL (ref 0–0.1)
BASOPHILS NFR BLD AUTO: 1 % (ref 0–1)
BUN SERPL-MCNC: 53 MG/DL (ref 5–25)
CALCIUM SERPL-MCNC: 8.2 MG/DL (ref 8.3–10.1)
CHLORIDE SERPL-SCNC: 105 MMOL/L (ref 100–108)
CO2 SERPL-SCNC: 27 MMOL/L (ref 21–32)
CREAT SERPL-MCNC: 4.84 MG/DL (ref 0.6–1.3)
EOSINOPHIL # BLD AUTO: 1.15 THOUSAND/ΜL (ref 0–0.61)
EOSINOPHIL NFR BLD AUTO: 14 % (ref 0–6)
ERYTHROCYTE [DISTWIDTH] IN BLOOD BY AUTOMATED COUNT: 17.9 % (ref 11.6–15.1)
GFR SERPL CREATININE-BSD FRML MDRD: 9 ML/MIN/1.73SQ M
GLUCOSE SERPL-MCNC: 167 MG/DL (ref 65–140)
GLUCOSE SERPL-MCNC: 189 MG/DL (ref 65–140)
GLUCOSE SERPL-MCNC: 215 MG/DL (ref 65–140)
HCT VFR BLD AUTO: 25.3 % (ref 34.8–46.1)
HGB BLD-MCNC: 7.4 G/DL (ref 11.5–15.4)
IMM GRANULOCYTES # BLD AUTO: 0.04 THOUSAND/UL (ref 0–0.2)
IMM GRANULOCYTES NFR BLD AUTO: 1 % (ref 0–2)
LYMPHOCYTES # BLD AUTO: 0.64 THOUSANDS/ΜL (ref 0.6–4.47)
LYMPHOCYTES NFR BLD AUTO: 8 % (ref 14–44)
MCH RBC QN AUTO: 29 PG (ref 26.8–34.3)
MCHC RBC AUTO-ENTMCNC: 29.2 G/DL (ref 31.4–37.4)
MCV RBC AUTO: 99 FL (ref 82–98)
MONOCYTES # BLD AUTO: 0.57 THOUSAND/ΜL (ref 0.17–1.22)
MONOCYTES NFR BLD AUTO: 7 % (ref 4–12)
NEUTROPHILS # BLD AUTO: 5.64 THOUSANDS/ΜL (ref 1.85–7.62)
NEUTS SEG NFR BLD AUTO: 69 % (ref 43–75)
NRBC BLD AUTO-RTO: 0 /100 WBCS
PLATELET # BLD AUTO: 111 THOUSANDS/UL (ref 149–390)
PMV BLD AUTO: 11.1 FL (ref 8.9–12.7)
POTASSIUM SERPL-SCNC: 4.2 MMOL/L (ref 3.5–5.3)
RBC # BLD AUTO: 2.55 MILLION/UL (ref 3.81–5.12)
SODIUM SERPL-SCNC: 144 MMOL/L (ref 136–145)
WBC # BLD AUTO: 8.08 THOUSAND/UL (ref 4.31–10.16)

## 2021-04-05 PROCEDURE — 99239 HOSP IP/OBS DSCHRG MGMT >30: CPT | Performed by: INTERNAL MEDICINE

## 2021-04-05 PROCEDURE — 97116 GAIT TRAINING THERAPY: CPT

## 2021-04-05 PROCEDURE — 80048 BASIC METABOLIC PNL TOTAL CA: CPT | Performed by: INTERNAL MEDICINE

## 2021-04-05 PROCEDURE — 97530 THERAPEUTIC ACTIVITIES: CPT

## 2021-04-05 PROCEDURE — 97168 OT RE-EVAL EST PLAN CARE: CPT

## 2021-04-05 PROCEDURE — 85025 COMPLETE CBC W/AUTO DIFF WBC: CPT | Performed by: INTERNAL MEDICINE

## 2021-04-05 PROCEDURE — 82948 REAGENT STRIP/BLOOD GLUCOSE: CPT

## 2021-04-05 PROCEDURE — 94761 N-INVAS EAR/PLS OXIMETRY MLT: CPT

## 2021-04-05 RX ORDER — MIDODRINE HYDROCHLORIDE 10 MG/1
10 TABLET ORAL
Qty: 30 TABLET | Refills: 1 | Status: SHIPPED | OUTPATIENT
Start: 2021-04-05

## 2021-04-05 RX ORDER — INSULIN GLARGINE 300 U/ML
25 INJECTION, SOLUTION SUBCUTANEOUS
Qty: 4.5 ML | Refills: 0 | Status: SHIPPED | OUTPATIENT
Start: 2021-04-05

## 2021-04-05 RX ORDER — ACETAMINOPHEN 325 MG/1
650 TABLET ORAL EVERY 6 HOURS PRN
Refills: 0
Start: 2021-04-05

## 2021-04-05 RX ORDER — CALCIUM ACETATE 667 MG/1
667 CAPSULE ORAL
Qty: 90 CAPSULE | Refills: 1 | Status: SHIPPED | OUTPATIENT
Start: 2021-04-05

## 2021-04-05 RX ORDER — CARVEDILOL 12.5 MG/1
12.5 TABLET ORAL 2 TIMES DAILY
Qty: 60 TABLET | Refills: 1 | Status: SHIPPED | OUTPATIENT
Start: 2021-04-05

## 2021-04-05 RX ADMIN — DIPHENHYDRAMINE HYDROCHLORIDE 25 MG: 25 TABLET ORAL at 02:16

## 2021-04-05 RX ADMIN — CLOPIDOGREL BISULFATE 75 MG: 75 TABLET ORAL at 09:25

## 2021-04-05 RX ADMIN — INSULIN LISPRO 2 UNITS: 100 INJECTION, SOLUTION INTRAVENOUS; SUBCUTANEOUS at 07:41

## 2021-04-05 RX ADMIN — INSULIN LISPRO 1 UNITS: 100 INJECTION, SOLUTION INTRAVENOUS; SUBCUTANEOUS at 12:23

## 2021-04-05 RX ADMIN — CARVEDILOL 12.5 MG: 12.5 TABLET, FILM COATED ORAL at 09:25

## 2021-04-05 RX ADMIN — ATORVASTATIN CALCIUM 40 MG: 40 TABLET, FILM COATED ORAL at 09:25

## 2021-04-05 RX ADMIN — HEPARIN SODIUM 5000 UNITS: 5000 INJECTION INTRAVENOUS; SUBCUTANEOUS at 05:47

## 2021-04-05 RX ADMIN — ACETAMINOPHEN 650 MG: 325 TABLET, FILM COATED ORAL at 02:16

## 2021-04-05 RX ADMIN — CALCIUM ACETATE 667 MG: 667 CAPSULE ORAL at 07:40

## 2021-04-05 RX ADMIN — ASPIRIN 81 MG: 81 TABLET, COATED ORAL at 09:25

## 2021-04-05 RX ADMIN — INSULIN LISPRO 3 UNITS: 100 INJECTION, SOLUTION INTRAVENOUS; SUBCUTANEOUS at 07:41

## 2021-04-05 RX ADMIN — CALCIUM ACETATE 667 MG: 667 CAPSULE ORAL at 12:24

## 2021-04-05 RX ADMIN — INSULIN GLARGINE 25 UNITS: 100 INJECTION, SOLUTION SUBCUTANEOUS at 09:25

## 2021-04-05 RX ADMIN — INSULIN LISPRO 3 UNITS: 100 INJECTION, SOLUTION INTRAVENOUS; SUBCUTANEOUS at 12:24

## 2021-04-05 NOTE — PLAN OF CARE
Problem: OCCUPATIONAL THERAPY ADULT  Goal: Performs self-care activities at highest level of function for planned discharge setting  See evaluation for individualized goals  Description: Treatment Interventions: ADL retraining, Functional transfer training, UE strengthening/ROM, Endurance training, Patient/family training, Equipment evaluation/education, Activityengagement  Equipment Recommended: (Reports no need for commode  Owns a tub bench  )       See flowsheet documentation for full assessment, interventions and recommendations  Note: Limitation: Decreased ADL status, Decreased endurance, Decreased self-care trans, Decreased high-level ADLs  Prognosis: Good  Assessment: Pt admit 3/15/21 for CHF  Undergoing dialysis  Seen for OT eval 4/1/21 and discharged from services  Re-consult placed to assess functional status due to fluctuating disposition and performance  Pt seen for re-evaluation and presents to OT below functional baseline due to the following performance deficits: balance, stand tolerance, functional mobility, coping, community integration, and self care  Therefore, would benefit from OT to achieve optimal level of performance  Occupational performance areas to be addressed include: bathing, dressing, activity tolerance, and clothing management  Pt continues to desat on 3L O2 during activity  Required (S) for  Functional transfers and ambulation using RW  Fatigues w/ minimal challenges  Pt with fluctuating BP's t/o session and is asymptomatic today  The patient's raw score on the AM-PAC Daily Activity inpatient short form is 22, standardized score is 47 1, greater than 39 4  Patients at this level are likely to benefit from discharge to home  Re[facility] w/ home OT  Disposition confirmed several times w/ pt  Also, reports will not be alone and friend is RN        OT Discharge Recommendation: Home with skilled therapy([facility] )

## 2021-04-05 NOTE — PLAN OF CARE
Problem: Potential for Falls  Goal: Patient will remain free of falls  Description: INTERVENTIONS:  - Assess patient frequently for physical needs  -  Identify cognitive and physical deficits and behaviors that affect risk of falls    -  [place] fall precautions as indicated by assessment   - Educate patient/family on patient safety including physical limitations  - Instruct patient to call for assistance with activity based on assessment  - Modify environment to reduce risk of injury  - Consider OT/PT consult to assist with strengthening/mobility  Outcome: Adequate for Discharge     Problem: PAIN - ADULT  Goal: Verbalizes/displays adequate comfort level or baseline comfort level  Description: Interventions:  - Encourage patient to monitor pain and request assistance  - Assess pain using appropriate pain scale  - Administer analgesics based on type and severity of pain and evaluate response  - Implement non-pharmacological measures as appropriate and evaluate response  - Consider cultural and social influences on pain and pain management  - Notify physician/advanced practitioner if interventions unsuccessful or patient reports new pain  Outcome: Adequate for Discharge     Problem: INFECTION - ADULT  Goal: Absence or prevention of progression during hospitalization  Description: INTERVENTIONS:  - Assess and monitor for signs and symptoms of infection  - Monitor lab/diagnostic results  - Monitor all insertion sites, i e  indwelling lines, tubes, and drains  - Monitor endotracheal if appropriate and nasal secretions for changes in amount and color  - [place] appropriate cooling/warming therapies per order  - Administer medications as ordered  - Instruct and encourage patient and family to use good hand hygiene technique  - Identify and instruct in appropriate isolation precautions for identified infection/condition  Outcome: Adequate for Discharge  Goal: Absence of fever/infection during neutropenic period  Description: INTERVENTIONS:  - Monitor WBC    Outcome: Adequate for Discharge     Problem: SAFETY ADULT  Goal: Patient will remain free of falls  Description: INTERVENTIONS:  - Assess patient frequently for physical needs  -  Identify cognitive and physical deficits and behaviors that affect risk of falls    -  [place] fall precautions as indicated by assessment   - Educate patient/family on patient safety including physical limitations  - Instruct patient to call for assistance with activity based on assessment  - Modify environment to reduce risk of injury  - Consider OT/PT consult to assist with strengthening/mobility  Outcome: Adequate for Discharge  Goal: Maintain or return to baseline ADL function  Description: INTERVENTIONS:  -  Assess patient's ability to carry out ADLs; assess patient's baseline for ADL function and identify physical deficits which impact ability to perform ADLs (bathing, care of mouth/teeth, toileting, grooming, dressing, etc )  - Assess/evaluate cause of self-care deficits   - Assess range of motion  - Assess patient's mobility; develop plan if impaired  - Assess patient's need for assistive devices and provide as appropriate  - Encourage maximum independence but intervene and supervise when necessary  - Involve family in performance of ADLs  - Assess for home care needs following discharge   - Consider OT consult to assist with ADL evaluation and planning for discharge  - Provide patient education as appropriate  Outcome: Adequate for Discharge  Goal: Maintain or return mobility status to optimal level  Description: INTERVENTIONS:  - Assess patient's baseline mobility status (ambulation, transfers, stairs, etc )    - Identify cognitive and physical deficits and behaviors that affect mobility  - Identify mobility aids required to assist with transfers and/or ambulation (gait belt, sit-to-stand, lift, walker, cane, etc )  - [place] fall precautions as indicated by assessment  - Record patient progress and toleration of activity level on Mobility SBAR; progress patient to next Phase/Stage  - Instruct patient to call for assistance with activity based on assessment  - Consider rehabilitation consult to assist with strengthening/weightbearing, etc   Outcome: Adequate for Discharge     Problem: DISCHARGE PLANNING  Goal: Discharge to home or other facility with appropriate resources  Description: INTERVENTIONS:  - Identify barriers to discharge w/patient and caregiver  - Arrange for needed discharge resources and transportation as appropriate  - Identify discharge learning needs (meds, wound care, etc )  - Arrange for interpretive services to assist at discharge as needed  - Refer to Case Management Department for coordinating discharge planning if the patient needs post-hospital services based on physician/advanced practitioner order or complex needs related to functional status, cognitive ability, or social support system  Outcome: Adequate for Discharge     Problem: Knowledge Deficit  Goal: Patient/family/caregiver demonstrates understanding of disease process, treatment plan, medications, and discharge instructions  Description: Complete learning assessment and assess knowledge base    Interventions:  - Provide teaching at level of understanding  - Provide teaching via preferred learning methods  Outcome: Adequate for Discharge     Problem: CARDIOVASCULAR - ADULT  Goal: Maintains optimal cardiac output and hemodynamic stability  Description: INTERVENTIONS:  - Monitor I/O, vital signs and rhythm  - Monitor for S/S and trends of decreased cardiac output  - Administer and titrate ordered vasoactive medications to optimize hemodynamic stability  - Assess quality of pulses, skin color and temperature  - Assess for signs of decreased coronary artery perfusion  - Instruct patient to report change in severity of symptoms  Outcome: Adequate for Discharge  Goal: Absence of cardiac dysrhythmias or at baseline rhythm  Description: INTERVENTIONS:  - Continuous cardiac monitoring, vital signs, obtain 12 lead EKG if ordered  - Administer antiarrhythmic and heart rate control medications as ordered  - Monitor electrolytes and administer replacement therapy as ordered  Outcome: Adequate for Discharge     Problem: SKIN/TISSUE INTEGRITY - ADULT  Goal: Skin integrity remains intact  Description: INTERVENTIONS  - Identify patients at risk for skin breakdown  - Assess and monitor skin integrity  - Assess and monitor nutrition and hydration status  - Monitor labs (i e  albumin)  - Assess for incontinence   - Turn and reposition patient  - Assist with mobility/ambulation  - Relieve pressure over bony prominences  - Avoid friction and shearing  - Provide appropriate hygiene as needed including keeping skin clean and dry  - Evaluate need for skin moisturizer/barrier cream  - Collaborate with interdisciplinary team (i e  Nutrition, Rehabilitation, etc )   - Patient/family teaching  Outcome: Adequate for Discharge  Goal: Incision(s), wounds(s) or drain site(s) healing without S/S of infection  Description: INTERVENTIONS  - Assess and document risk factors for skin impairment   - Assess and document dressing, incision, wound bed, drain sites and surrounding tissue  - Consider nutrition services referral as needed  - Oral mucous membranes remain intact  - Provide patient/ family education  Outcome: Adequate for Discharge  Goal: Oral mucous membranes remain intact  Description: INTERVENTIONS  - Assess oral mucosa and hygiene practices  - Implement preventative oral hygiene regimen  - Implement oral medicated treatments as ordered  - Initiate Nutrition services referral as needed  Outcome: Adequate for Discharge     Problem: Prexisting or High Potential for Compromised Skin Integrity  Goal: Skin integrity is maintained or improved  Description: INTERVENTIONS:  - Identify patients at risk for skin breakdown  - Assess and monitor skin integrity  - Assess and monitor nutrition and hydration status  - Monitor labs   - Assess for incontinence   - Turn and reposition patient  - Assist with mobility/ambulation  - Relieve pressure over bony prominences  - Avoid friction and shearing  - Provide appropriate hygiene as needed including keeping skin clean and dry  - Evaluate need for skin moisturizer/barrier cream  - Collaborate with interdisciplinary team   - Patient/family teaching  - Consider wound care consult   Outcome: Adequate for Discharge     Problem: Nutrition/Hydration-ADULT  Goal: Nutrient/Hydration intake appropriate for improving, restoring or maintaining nutritional needs  Description: Monitor and assess patient's nutrition/hydration status for malnutrition  Collaborate with interdisciplinary team and initiate plan and interventions as ordered  Monitor patient's weight and dietary intake as ordered or per policy  Utilize nutrition screening tool and intervene as necessary  Determine patient's food preferences and provide high-protein, high-caloric foods as appropriate       INTERVENTIONS:  - Monitor oral intake, urinary output, labs, and treatment plans  - Assess nutrition and hydration status and recommend course of action  - Evaluate amount of meals eaten  - Assist patient with eating if necessary   - Allow adequate time for meals  - Recommend/ encourage appropriate diets, oral nutritional supplements, and vitamin/mineral supplements  - Order, calculate, and assess calorie counts as needed  - Recommend, monitor, and adjust tube feedings and TPN/PPN based on assessed needs  - Assess need for intravenous fluids  - Provide specific nutrition/hydration education as appropriate  - Include patient/family/caregiver in decisions related to nutrition  Outcome: Adequate for Discharge     Problem: RESPIRATORY - ADULT  Goal: Achieves optimal ventilation and oxygenation  Description: INTERVENTIONS:  - Assess for changes in respiratory status  - Assess for changes in mentation and behavior  - Position to facilitate oxygenation and minimize respiratory effort  - Oxygen administered by appropriate delivery if ordered  - Initiate smoking cessation education as indicated  - Encourage broncho-pulmonary hygiene including cough, deep breathe, Incentive Spirometry  - Assess the need for suctioning and aspirate as needed  - Assess and instruct to report SOB or any respiratory difficulty  - Respiratory Therapy support as indicated  Outcome: Adequate for Discharge     Problem: METABOLIC, FLUID AND ELECTROLYTES - ADULT  Goal: Electrolytes maintained within normal limits  Description: INTERVENTIONS:  - Monitor labs and assess patient for signs and symptoms of electrolyte imbalances  - Administer electrolyte replacement as ordered  - Monitor response to electrolyte replacements, including repeat lab results as appropriate  - Instruct patient on fluid and nutrition as appropriate  Outcome: Adequate for Discharge  Goal: Fluid balance maintained  Description: INTERVENTIONS:  - Monitor labs   - Monitor I/O and WT  - Instruct patient on fluid and nutrition as appropriate  - Assess for signs & symptoms of volume excess or deficit  Outcome: Adequate for Discharge  Goal: Glucose maintained within target range  Description: INTERVENTIONS:  - Monitor Blood Glucose as ordered  - Assess for signs and symptoms of hyperglycemia and hypoglycemia  - Administer ordered medications to maintain glucose within target range  - Assess nutritional intake and initiate nutrition service referral as needed  Outcome: Adequate for Discharge     Problem: HEMATOLOGIC - ADULT  Goal: Maintains hematologic stability  Description: INTERVENTIONS  - Assess for signs and symptoms of bleeding or hemorrhage  - Monitor labs  - Administer supportive blood products/factors as ordered and appropriate  Outcome: Adequate for Discharge     Problem: MUSCULOSKELETAL - ADULT  Goal: Maintain or return mobility to safest level of function  Description: INTERVENTIONS:  - Assess patient's ability to carry out ADLs; assess patient's baseline for ADL function and identify physical deficits which impact ability to perform ADLs (bathing, care of mouth/teeth, toileting, grooming, dressing, etc )  - Assess/evaluate cause of self-care deficits   - Assess range of motion  - Assess patient's mobility  - Assess patient's need for assistive devices and provide as appropriate  - Encourage maximum independence but intervene and supervise when necessary  - Involve family in performance of ADLs  - Assess for home care needs following discharge   - Consider OT consult to assist with ADL evaluation and planning for discharge  - Provide patient education as appropriate  Outcome: Adequate for Discharge

## 2021-04-05 NOTE — CASE MANAGEMENT
LOS: 21 days  Continuing to follow patient  Patient is medically cleared for discharge  Seen by PT/OT this am and patient is feeling better and ambulating in room and able to do 1-2 steps  PT/OT recommending home with home services  [facility] to follow patient  Patient plans on staying with her friend Hortensia Howell when she leaves the hospital at [facility], [address] [place] [address] [address] ,[place], [address]  [facility] does not service that area  Referrals via [facility] to [address]  [facility] is able to see patient  Patient will need home O2 at [address]l [address]  She is agreeable  Given freedom of choice - she does not have a preference  Referral via [facility] to [facility]  Patients daughter to transport home  Will follow

## 2021-04-05 NOTE — DISCHARGE SUMMARY
Discharge Summary - Autumn Gutierrez 60 y o  female MRN: [id number]    Unit/Bed#: -01 Encounter: [id number]    Admission Date: 3/15/2021     Admitting Diagnosis: CHF (congestive heart failure) ([address] ) [I50 9]  Hyperglycemia [R73 9]  CKD (chronic kidney disease) [N18 9]  Foot pain, left [M79 672]  Cellulitis of left foot [L03 116]  Pain of left heel [M79 672]    HPI:  81-year-old female presented with increased leg edema shortness of breath and left foot pain with cellulitis  Consults  Podiatry-Dr Ortiz Adjutant  Nephrology Dr Jannie Fernandez  Procedures Performed:   Orders Placed This Encounter   Procedures    ED ECG Documentation Only       Hospital Course:  Patient was admitted on March 15, 2021 with diagnosis of acute on chronic diastolic CHF with active cellulitis and marked leg edema  She had wound care consults and was placed empirically on antibiotic therapy with cefepime and vancomycin  Patient had been noncompliant with her diuretic meds since November and had marked increase in her edema  She was given IV torsemide then was seen by Nephrology given IV albumin in addition to IV Lasix for her worsening chronic kidney disease issues  She was seen by podiatry for the heel ulcer issues and x-ray was done which showed calcaneal spurring but no acute osseous abnormalities  Patient had significant anemia felt to be in part due to her chronic kidney disease issues  She also has type 2 diabetes and had stopped using her insulin several months prior to admission  She was placed on a lower dose of Lantus and mealtime Humalog during her hospital stay with improving control  With [place] regard to her acute on chronic kidney disease issue she underwent an IR biopsy which showed showed severe diabetic glomerular sclerosis and severe I FTA with progression to end-stage renal disease-patient was started on dialysis due to uremia and fluid overload issues    Dialysis was initiated on March 30, 2021 and plans will be made for placement at   [facility]  Patient is interested in considering peritoneal dialysis options in future  Patient did have hypotension with dialysis and has been placed on midodrine 10 mg before hemodialysis on those days  Patient also had hyperphosphatemia related to her chronic kidney disease and is on calcium acetate 3 times daily 667 she also has secondary hyperparathyroidism and this will be monitored by the Nephrology team as an outpatient  Patient had improvement in her weakness and shortness of breath after receiving transfusion and additional fluid stabilization over the weekend  She was felt to be stable for discharge to home on April 5, 2021 and after being seen by PT she was felt to be appropriate for discharge to home with home PT and visiting nurses to follow    Creatinine on day of discharge was 4 84 hemoglobin 7 4 hematocrit 25 3  Patient had O2 sat of 85% on room air on 3 L this improved to 91% with ambulation up to 92%-patient will require continuous oxygen at home and this has been ordered as she did show improvement with this level of supplementation      Significant Findings, Care, Treatment and Services Provided:    General appearance: alert  Neck: no adenopathy and no JVD  Lungs: Minimally decreased breath sounds in the bases  Heart: regular rate and rhythm, S1, S2 normal, no murmur, click, rub or gallop  Abdomen: soft, non-tender; bowel sounds normal; no masses,  no organomegaly obese  Extremities:  Dressings in place on the legs bilaterally with trace edema  Skin:  Dressings in place on lower extremities  Neurologic:  No focal motor deficits       Complications: none    Discharge Diagnosis: Principal Problem:    Acute on chronic combined systolic and diastolic congestive heart failure (HCC)  Active Problems:    Anemia due to stage 4 chronic kidney disease (HCC)    Cellulitis of both lower extremities    Acute kidney injury superimposed on chronic kidney disease [facility])    Essential hypertension    Coronary artery disease involving native coronary artery of native heart without angina pectoris    Foot ulcer, left (HCC)    Non compliance w medication regimen    Class 3 severe obesity with body mass index (BMI) of 45 0 to 49 9 in adult [facility])    Type 2 diabetes mellitus with stage 4 chronic kidney disease, with long-term current use of insulin (HCC)      Acute on chronic respiratory failure with hypoxia  Condition at Discharge: fair     Discharge instructions/Information to patient and family:   See after visit summary for information provided to patient and family  Provisions for Follow-Up Care:  See after visit summary for information related to follow-up care and any pertinent home health orders  Disposition: Home    Planned Readmission: No    Discharge Statement   I spent 35 minutes discharging the patient  This time was spent on the day of discharge  I had direct contact with the patient on the day of discharge  Coordination of care with case management, rehab team   Discharge Medications:  See after visit summary for reconciled discharge medications provided to patient and family          Venita Landis DO

## 2021-04-05 NOTE — PLAN OF CARE
Problem: Potential for Falls  Goal: Patient will remain free of falls  Description: INTERVENTIONS:  - Assess patient frequently for physical needs  -  Identify cognitive and physical deficits and behaviors that affect risk of falls    -  [place] fall precautions as indicated by assessment   - Educate patient/family on patient safety including physical limitations  - Instruct patient to call for assistance with activity based on assessment  - Modify environment to reduce risk of injury  - Consider OT/PT consult to assist with strengthening/mobility  Outcome: Progressing     Problem: PAIN - ADULT  Goal: Verbalizes/displays adequate comfort level or baseline comfort level  Description: Interventions:  - Encourage patient to monitor pain and request assistance  - Assess pain using appropriate pain scale  - Administer analgesics based on type and severity of pain and evaluate response  - Implement non-pharmacological measures as appropriate and evaluate response  - Consider cultural and social influences on pain and pain management  - Notify physician/advanced practitioner if interventions unsuccessful or patient reports new pain  Outcome: Progressing     Problem: INFECTION - ADULT  Goal: Absence or prevention of progression during hospitalization  Description: INTERVENTIONS:  - Assess and monitor for signs and symptoms of infection  - Monitor lab/diagnostic results  - Monitor all insertion sites, i e  indwelling lines, tubes, and drains  - Monitor endotracheal if appropriate and nasal secretions for changes in amount and color  - [place] appropriate cooling/warming therapies per order  - Administer medications as ordered  - Instruct and encourage patient and family to use good hand hygiene technique  - Identify and instruct in appropriate isolation precautions for identified infection/condition  Outcome: Progressing  Goal: Absence of fever/infection during neutropenic period  Description: INTERVENTIONS:  - Monitor WBC    Outcome: Progressing     Problem: SAFETY ADULT  Goal: Patient will remain free of falls  Description: INTERVENTIONS:  - Assess patient frequently for physical needs  -  Identify cognitive and physical deficits and behaviors that affect risk of falls    -  [place] fall precautions as indicated by assessment   - Educate patient/family on patient safety including physical limitations  - Instruct patient to call for assistance with activity based on assessment  - Modify environment to reduce risk of injury  - Consider OT/PT consult to assist with strengthening/mobility  Outcome: Progressing  Goal: Maintain or return to baseline ADL function  Description: INTERVENTIONS:  -  Assess patient's ability to carry out ADLs; assess patient's baseline for ADL function and identify physical deficits which impact ability to perform ADLs (bathing, care of mouth/teeth, toileting, grooming, dressing, etc )  - Assess/evaluate cause of self-care deficits   - Assess range of motion  - Assess patient's mobility; develop plan if impaired  - Assess patient's need for assistive devices and provide as appropriate  - Encourage maximum independence but intervene and supervise when necessary  - Involve family in performance of ADLs  - Assess for home care needs following discharge   - Consider OT consult to assist with ADL evaluation and planning for discharge  - Provide patient education as appropriate  Outcome: Progressing  Goal: Maintain or return mobility status to optimal level  Description: INTERVENTIONS:  - Assess patient's baseline mobility status (ambulation, transfers, stairs, etc )    - Identify cognitive and physical deficits and behaviors that affect mobility  - Identify mobility aids required to assist with transfers and/or ambulation (gait belt, sit-to-stand, lift, walker, cane, etc )  - [place] fall precautions as indicated by assessment  - Record patient progress and toleration of activity level on Mobility SBAR; progress patient to next Phase/Stage  - Instruct patient to call for assistance with activity based on assessment  - Consider rehabilitation consult to assist with strengthening/weightbearing, etc   Outcome: Progressing     Problem: DISCHARGE PLANNING  Goal: Discharge to home or other facility with appropriate resources  Description: INTERVENTIONS:  - Identify barriers to discharge w/patient and caregiver  - Arrange for needed discharge resources and transportation as appropriate  - Identify discharge learning needs (meds, wound care, etc )  - Arrange for interpretive services to assist at discharge as needed  - Refer to Case Management Department for coordinating discharge planning if the patient needs post-hospital services based on physician/advanced practitioner order or complex needs related to functional status, cognitive ability, or social support system  Outcome: Progressing     Problem: Knowledge Deficit  Goal: Patient/family/caregiver demonstrates understanding of disease process, treatment plan, medications, and discharge instructions  Description: Complete learning assessment and assess knowledge base    Interventions:  - Provide teaching at level of understanding  - Provide teaching via preferred learning methods  Outcome: Progressing     Problem: CARDIOVASCULAR - ADULT  Goal: Maintains optimal cardiac output and hemodynamic stability  Description: INTERVENTIONS:  - Monitor I/O, vital signs and rhythm  - Monitor for S/S and trends of decreased cardiac output  - Administer and titrate ordered vasoactive medications to optimize hemodynamic stability  - Assess quality of pulses, skin color and temperature  - Assess for signs of decreased coronary artery perfusion  - Instruct patient to report change in severity of symptoms  Outcome: Progressing  Goal: Absence of cardiac dysrhythmias or at baseline rhythm  Description: INTERVENTIONS:  - Continuous cardiac monitoring, vital signs, obtain 12 lead EKG if ordered  - Administer antiarrhythmic and heart rate control medications as ordered  - Monitor electrolytes and administer replacement therapy as ordered  Outcome: Progressing     Problem: SKIN/TISSUE INTEGRITY - ADULT  Goal: Skin integrity remains intact  Description: INTERVENTIONS  - Identify patients at risk for skin breakdown  - Assess and monitor skin integrity  - Assess and monitor nutrition and hydration status  - Monitor labs (i e  albumin)  - Assess for incontinence   - Turn and reposition patient  - Assist with mobility/ambulation  - Relieve pressure over bony prominences  - Avoid friction and shearing  - Provide appropriate hygiene as needed including keeping skin clean and dry  - Evaluate need for skin moisturizer/barrier cream  - Collaborate with interdisciplinary team (i e  Nutrition, Rehabilitation, etc )   - Patient/family teaching  Outcome: Progressing  Goal: Incision(s), wounds(s) or drain site(s) healing without S/S of infection  Description: INTERVENTIONS  - Assess and document risk factors for skin impairment   - Assess and document dressing, incision, wound bed, drain sites and surrounding tissue  - Consider nutrition services referral as needed  - Oral mucous membranes remain intact  - Provide patient/ family education  Outcome: Progressing  Goal: Oral mucous membranes remain intact  Description: INTERVENTIONS  - Assess oral mucosa and hygiene practices  - Implement preventative oral hygiene regimen  - Implement oral medicated treatments as ordered  - Initiate Nutrition services referral as needed  Outcome: Progressing     Problem: RESPIRATORY - ADULT  Goal: Achieves optimal ventilation and oxygenation  Description: INTERVENTIONS:  - Assess for changes in respiratory status  - Assess for changes in mentation and behavior  - Position to facilitate oxygenation and minimize respiratory effort  - Oxygen administered by appropriate delivery if ordered  - Initiate smoking cessation education as indicated  - Encourage broncho-pulmonary hygiene including cough, deep breathe, Incentive Spirometry  - Assess the need for suctioning and aspirate as needed  - Assess and instruct to report SOB or any respiratory difficulty  - Respiratory Therapy support as indicated  Outcome: Progressing     Problem: METABOLIC, FLUID AND ELECTROLYTES - ADULT  Goal: Electrolytes maintained within normal limits  Description: INTERVENTIONS:  - Monitor labs and assess patient for signs and symptoms of electrolyte imbalances  - Administer electrolyte replacement as ordered  - Monitor response to electrolyte replacements, including repeat lab results as appropriate  - Instruct patient on fluid and nutrition as appropriate  Outcome: Progressing  Goal: Fluid balance maintained  Description: INTERVENTIONS:  - Monitor labs   - Monitor I/O and WT  - Instruct patient on fluid and nutrition as appropriate  - Assess for signs & symptoms of volume excess or deficit  Outcome: Progressing  Goal: Glucose maintained within target range  Description: INTERVENTIONS:  - Monitor Blood Glucose as ordered  - Assess for signs and symptoms of hyperglycemia and hypoglycemia  - Administer ordered medications to maintain glucose within target range  - Assess nutritional intake and initiate nutrition service referral as needed  Outcome: Progressing     Problem: HEMATOLOGIC - ADULT  Goal: Maintains hematologic stability  Description: INTERVENTIONS  - Assess for signs and symptoms of bleeding or hemorrhage  - Monitor labs  - Administer supportive blood products/factors as ordered and appropriate  Outcome: Progressing     Problem: MUSCULOSKELETAL - ADULT  Goal: Maintain or return mobility to safest level of function  Description: INTERVENTIONS:  - Assess patient's ability to carry out ADLs; assess patient's baseline for ADL function and identify physical deficits which impact ability to perform ADLs (bathing, care of mouth/teeth, toileting, grooming, dressing, etc )  - Assess/evaluate cause of self-care deficits   - Assess range of motion  - Assess patient's mobility  - Assess patient's need for assistive devices and provide as appropriate  - Encourage maximum independence but intervene and supervise when necessary  - Involve family in performance of ADLs  - Assess for home care needs following discharge   - Consider OT consult to assist with ADL evaluation and planning for discharge  - Provide patient education as appropriate  Outcome: Progressing     Problem: Prexisting or High Potential for Compromised Skin Integrity  Goal: Skin integrity is maintained or improved  Description: INTERVENTIONS:  - Identify patients at risk for skin breakdown  - Assess and monitor skin integrity  - Assess and monitor nutrition and hydration status  - Monitor labs   - Assess for incontinence   - Turn and reposition patient  - Assist with mobility/ambulation  - Relieve pressure over bony prominences  - Avoid friction and shearing  - Provide appropriate hygiene as needed including keeping skin clean and dry  - Evaluate need for skin moisturizer/barrier cream  - Collaborate with interdisciplinary team   - Patient/family teaching  - Consider wound care consult   Outcome: Progressing     Problem: Nutrition/Hydration-ADULT  Goal: Nutrient/Hydration intake appropriate for improving, restoring or maintaining nutritional needs  Description: Monitor and assess patient's nutrition/hydration status for malnutrition  Collaborate with interdisciplinary team and initiate plan and interventions as ordered  Monitor patient's weight and dietary intake as ordered or per policy  Utilize nutrition screening tool and intervene as necessary  Determine patient's food preferences and provide high-protein, high-caloric foods as appropriate       INTERVENTIONS:  - Monitor oral intake, urinary output, labs, and treatment plans  - Assess nutrition and hydration status and recommend course of action  - Evaluate amount of meals eaten  - Assist patient with eating if necessary   - Allow adequate time for meals  - Recommend/ encourage appropriate diets, oral nutritional supplements, and vitamin/mineral supplements  - Order, calculate, and assess calorie counts as needed  - Recommend, monitor, and adjust tube feedings and TPN/PPN based on assessed needs  - Assess need for intravenous fluids  - Provide specific nutrition/hydration education as appropriate  - Include patient/family/caregiver in decisions related to nutrition  Outcome: Progressing

## 2021-04-05 NOTE — PHYSICAL THERAPY NOTE
PHYSICAL THERAPY NOTE       04/05/21 1006   PT Last Visit   PT Visit Date 04/05/21   Note Type   Note Type Treatment   Pain Assessment   Pain Assessment Tool 0-10   Pain Score No Pain   Restrictions/Precautions   Weight Bearing Precautions Per Order No   Other Precautions Fall Risk   General   Chart Reviewed Yes   Additional Pertinent History Patient now reports that [facility]    (1st floor setup  Will never be alone )   Response to Previous Treatment Patient with no complaints from previous session  Family/Caregiver Present Yes   Cognition   Overall Cognitive Status WFL   Arousal/Participation Alert   Attention Within functional limits   Orientation Level Oriented X4   Memory Within functional limits   Following Commands Follows all commands and directions without difficulty   Subjective   Subjective Patient upset about discharge stating "No one is on the same page "   Bed Mobility   Additional Comments Received OOB in chair   Transfers   Sit to Stand 5  Supervision   Additional items Armrests   Stand to Sit 5  Supervision   Additional items Armrests   Ambulation/Elevation   Gait pattern Short stride;Decreased foot clearance   Gait Assistance 5  Supervision   Assistive Device Rolling walker   Distance 25ft   Stair Management Assistance 4  Minimal assist   Additional items Assist x 1  (min A for CG)   Stair Management Technique With walker   Number of Stairs 2  (curb step performed twice)   Balance   Static Sitting Normal   Dynamic Sitting Normal   Static Standing Good   Dynamic Standing Fair +   Ambulatory Fair +   Endurance Deficit   Endurance Deficit Yes   Endurance Deficit Description Easily fatigued  Activity Tolerance   Activity Tolerance Patient limited by fatigue;Treatment limited secondary to medical complications (Comment)  (O2 desat to 88% on 3L  )   Medical Staff Made Aware Nataly ENGLAND Nurse Made Aware RNNona   Assessment   Prognosis Good   Problem List Decreased strength;Decreased endurance; Impaired balance;Decreased mobility;Obesity; Decreased skin integrity   Assessment Patient was received OOB in chair  Since last session, patient is now reporting that [facility] with 24 hr supervision and 1 curb step to enter  Clarified with patient multiple times  Patient was able to tolerate increased amb today and was able to perform a curb step  Her BP during session was seated 181/79, standing 154/66, standing for approx 2-3 minutes 151/68, after activity 129/58  Patient reports that she feels well today and has no dizziness or lightheadedness  Reports no SOB  O2 sat 97% on 3L at rest  With activity 88% on 3L  Recovery to 95% on 3L after 2-3 minutes of rest  With improved functional status, supervision at home and change in home setup, patient can return home with home P T  The patient's AM-PAC Basic Mobility Inpatient Short Form Raw Score is 22, Standardized Score is 47 4  A standardized score greater than 42 9 suggests the patient may benefit from discharge to home  Please also refer to the recommendation of the Physical Therapist for safe discharge planning  Barriers to Discharge None   Barriers to Discharge Comments [facility] where she has 24 hr supervision, 1STE   Goals   Patient Goals To go home today   STG Expiration Date 04/17/21   Plan   Treatment/Interventions Functional transfer training;LE strengthening/ROM; Elevations; Endurance training; Therapeutic exercise;Gait training;Spoke to nursing;OT;Spoke to case management   Progress Progressing toward goals   Recommendation   PT Discharge Recommendation Home with skilled therapy  (Home P T )   Equipment Recommended   (owns RW)   PT - OK to Discharge Yes   Additional Comments To [facility] with supervision and home P T     AM-PAC Basic Mobility Inpatient   Turning in Bed Without Bedrails 4   Lying on Back to Sitting on Edge of Flat Bed 4   Moving Bed to Chair 4   Standing Up From Chair 4   Walk in Room 3   Climb 3-5 Stairs 3   Basic Mobility Inpatient Raw Score 22   Basic Mobility Standardized Score 47 4   Nyla Milian PT            Patient Name: Qi Kennedy  VFB[facility]'R Date: 4/5/2021

## 2021-04-05 NOTE — RESPIRATORY THERAPY NOTE
Home Oxygen Qualifying Test       Patient name: Edgar Batista        : 1955   Date of Test:  2021  Diagnosis:      Home Oxygen Test:    **Medicare Guidelines require item(s) 1-5 on all ambulatory patients or 1 and 2 on non-ambulatory patients  1   Baseline SPO2 on Room Air at rest 85 %  2   SPO2 during exercise on Room Air  %  During exercise monitor SpO2  If SPO2 increases >=89% with ambulation do not add supplemental             oxygen  If <= 88% on room air add O2 via NC and titrate patient  Patient must be ambulated with O2 and titrated to > 88% with exertion  3   SPO2 on Oxygen at rest 92 % 3 lpm     4   SPO2 during exercise on Oxygen  90% a liter flow of 3 lpm     5   Exercise performed:          walking, distance 50 feet with walker (feet)          [x]  Supplemental Home Oxygen is indicated  []  Client does not qualify for home oxygen        Respiratory Additional Notes-     Llewellyn Leventhal, RT

## 2021-04-05 NOTE — PLAN OF CARE
Problem: PHYSICAL THERAPY ADULT  Goal: Performs mobility at highest level of function for planned discharge setting  See evaluation for individualized goals  Description: Treatment/Interventions: Elevations, LE strengthening/ROM, Therapeutic exercise, Endurance training, Gait training, Spoke to nursing, Spoke to MD  Equipment Recommended: (owns RW)       See flowsheet documentation for full assessment, interventions and recommendations  Note: Prognosis: Good  Problem List: Decreased strength, Decreased endurance, Impaired balance, Decreased mobility, Obesity, Decreased skin integrity  Assessment: Patient was received OOB in chair  Since last session, patient is now reporting that [facility] with 24 hr supervision and 1 curb step to enter  Clarified with patient multiple times  Patient was able to tolerate increased amb today and was able to perform a curb step  Her BP during session was seated 181/79, standing 154/66, standing for approx 2-3 minutes 151/68, after activity 129/58  Patient reports that she feels well today and has no dizziness or lightheadedness  Reports no SOB  O2 sat 97% on 3L at rest  With activity 88% on 3L  Recovery to 95% on 3L after 2-3 minutes of rest  With improved functional status, supervision at home and change in home setup, patient can return home with home P T  The patient's AM-PAC Basic Mobility Inpatient Short Form Raw Score is 22, Standardized Score is 47 4  A standardized score greater than 42 9 suggests the patient may benefit from discharge to home  Please also refer to the recommendation of the Physical Therapist for safe discharge planning  Barriers to Discharge: None  Barriers to Discharge Comments: [facility] where she has 24 hr supervision, 1STE  PT Discharge Recommendation: Home with skilled therapy(Home P T )     PT - OK to Discharge: Yes    See flowsheet documentation for full assessment

## 2021-04-05 NOTE — OCCUPATIONAL THERAPY NOTE
Occupational Therapy Re- Evaluation & Treatment     Patient Name: Conner Mcmahan  PWUBS'S Date: 2021  Problem List  Principal Problem:    Acute on chronic combined systolic and diastolic congestive heart failure (HCC)  Active Problems:    Class 3 severe obesity with body mass index (BMI) of 45 0 to 49 9 in adult [facility])    Essential hypertension    Coronary artery disease involving native coronary artery of native heart without angina pectoris    Type 2 diabetes mellitus with stage 4 chronic kidney disease, with long-term current use of insulin ([facility])    Anemia due to stage 4 chronic kidney disease ([facility])    Cellulitis of both lower extremities    Non compliance w medication regimen    Acute kidney injury superimposed on chronic kidney disease ([facility] )    [facility], [facility] )    Past Medical History  Past Medical History:   Diagnosis Date    Cardiac disease     CHF [facility]congestive [facility] failure) ([facility] )     Coronary artery disease     Diabetes mellitus ([facility] )     Hyperlipidemia     Hypertension     Renal disorder     TIA (transient ischemic attack)      Past Surgical History  Past Surgical History:   Procedure Laterality Date    CARDIAC SURGERY       SECTION      CORONARY ANGIOPLASTY WITH STENT PLACEMENT      CT NEEDLE BIOPSY KIDNEY  3/25/2021    IR TUNNELED DIALYSIS CATHETER PLACEMENT  3/30/2021             04/05/21 0910   OT Last Visit   OT Visit Date 21   Note Type   Note type Re-Evaluation   Restrictions/Precautions   Weight Bearing Precautions Per Order No   Other Precautions Fall Risk;O2  (Monitor BP's)   Pain Assessment   Pain Assessment Tool Pain Assessment not indicated - pt denies pain   Home Living   Additional Comments Pt plans [facility] on  s/u w/ 1 SONIA  Tub/shower w/ shower chair  Raised toilet  See IE for additional details  Prior Function   Comments See IE for additional details      Psychosocial   Psychosocial (WDL) X   Patient Behaviors/Mood   (Upset w/ CM )   Subjective   Subjective " I feel better today"    ADL   Eating Assistance 7  Independent   Grooming Assistance 7  Independent   UB Bathing Assistance 6  Modified Independent   LB Bathing Assistance 5  Supervision/Setup   UB Dressing Assistance 6  Modified independent   LB Dressing Assistance 4  Minimal Assistance   Toileting Assistance  6  Modified independent   Bed Mobility   Additional Comments Received OOB and remains there at end of session    Transfers   Sit to Stand 6  Modified independent   Additional items Armrests   Stand to Sit 6  Modified independent   Additional items Armrests   Stand pivot 5  Supervision   Additional items   (RW)   Functional Mobility   Functional Mobility 5  Supervision   Additional Comments RW    Balance   Static Sitting Normal   Dynamic Sitting Fair +   Static Standing Fair +   Dynamic Standing Fair   Ambulatory Fair   Activity Tolerance   Activity Tolerance Patient tolerated treatment well   Medical Staff Made Aware PT, Suzanne    Nurse Made Aware RNTobias    RUE Assessment   RUE Assessment WFL   LUE Assessment   LUE Assessment WFL   Hand Function   Gross Motor Coordination Functional   Fine Motor Coordination Functional   Sensation   Light Touch No apparent deficits   Cognition   Overall Cognitive Status WFL   Arousal/Participation Cooperative   Attention Within functional limits   Orientation Level Oriented X4   Memory Within functional limits   Following Commands Follows all commands and directions without difficulty   Assessment   Limitation Decreased ADL status; Decreased endurance;Decreased self-care trans;Decreased high-level ADLs   Prognosis Good   Assessment Pt admit 3/15/21 for CHF  Undergoing dialysis  Seen for OT eval 4/1/21 and discharged from services  Re-consult placed to assess functional status due to fluctuating disposition and performance   Pt seen for re-evaluation and presents to OT below functional baseline due to the following performance deficits: balance, stand tolerance, functional mobility, coping, community integration, and self care  Therefore, would benefit from OT to achieve optimal level of performance  Occupational performance areas to be addressed include: bathing, dressing, activity tolerance, and clothing management  Pt continues to desat on 3L O2 during activity  Required (S) for  Functional transfers and ambulation using RW  Fatigues w/ minimal challenges  Pt with fluctuating BP's t/o session and is asymptomatic today  The patient's raw score on the AM-PAC Daily Activity inpatient short form is 22, standardized score is 47 1, greater than 39 4  Patients at this level are likely to benefit from discharge to home  Recommend pt discharge to [facility] w/ home OT  Disposition confirmed several times w/ pt  Also, reports will not be alone and friend is RN  Goals   Patient Goals "To go home"    Plan   Treatment Interventions ADL retraining;Functional transfer training;UE strengthening/ROM; Endurance training;Patient/family training;Equipment evaluation/education; Activityengagement   Goal Expiration Date 04/19/21   OT Treatment Day 1   OT Frequency 2-3x/wk   Additional Treatment Session   Start Time 8725   End Time 0910   Treatment Assessment Pt seen for OT tx session s/p re-evaluation  STS from chair w/ MI  Performed stair training w/ CG for safety; assist to stabilize RW  Stand tolerance 5 mins  BP's as follows: Seated-181/79 HR 61; Stand- 154/66 HR 61; Stand after 1 min- 151/68 HR 62; Seated at end of session- 129/58  Pt is asymptomatic  Educated pt on waiting a few minutes between position changes to ensure safety prior to moving  Ambulation in room w/ (S) using RW  SPO2 88% 3L  F balance  Returned to recliner w/ all needs at end of session  Educated pt on benefit of home OT; agreed  Continue OT to achieve goals and decrease caregiver burden      Recommendation   OT Discharge Recommendation Home with skilled therapy  ([facility] )   [facility] Daily Activity Inpatient   Lower Body Dressing 3   Bathing 3   Toileting 4   Upper Body Dressing 4   Grooming 4   Eating 4   Daily Activity Raw Score 22   Daily Activity Standardized Score (Calc for Raw Score >=11) 47  1   AM-PAC Applied Cognition Inpatient   Following a Speech/Presentation 4   Understanding Ordinary Conversation 4   Taking Medications 4   Remembering Where Things Are Placed or Put Away 4   Remembering List of 4-5 Errands 4   Taking Care of Complicated Tasks 3   Applied Cognition Raw Score 23   Applied Cognition Standardized Score 53 08     GOALS:        Pt will complete LB ADL's w/ (S) using AE PRN     Pt will complete toileting including hygiene and clothing management w/ MI      Pt will complete functional transfers and functional mobility w/ MI using RW     Pt will complete dynamic and unsupported stand balance w/ G- for safe clothing management      Pt will improve stand tolerance to 5-10 mins for safety w/ ADL tasks      Pt will improve activity tolerance to G for 20- 30 min tx session for increased engagement in functional tasks     Kris CORDOVA, OTR/L

## 2021-04-06 ENCOUNTER — TRANSCRIBE ORDERS (OUTPATIENT)
Dept: LAB | Facility: HOSPITAL | Age: 66
End: 2021-04-06

## 2021-04-06 DIAGNOSIS — N28.9 KIDNEY DISEASE: Primary | ICD-10-CM

## 2021-04-06 LAB — SCAN RESULT: NORMAL

## 2021-04-20 DIAGNOSIS — N18.6 ESRD (END STAGE RENAL DISEASE) (HCC): Primary | ICD-10-CM

## 2021-04-20 NOTE — PROGRESS NOTES
Patient is agreeable to Advance care planning meeting  Would like daughter to be present  Full note in [employer]  Orders placed  Currently TTS schedule on HD but being evaluated for transition to PD

## 2021-04-21 ENCOUNTER — TELEPHONE (OUTPATIENT)
Dept: NEPHROLOGY | Facility: HOSPITAL | Age: 66
End: 2021-04-21

## 2021-04-26 ENCOUNTER — OFFICE VISIT (OUTPATIENT)
Dept: WOUND CARE | Facility: HOSPITAL | Age: 66
End: 2021-04-26
Payer: COMMERCIAL

## 2021-04-26 VITALS
HEART RATE: 74 BPM | TEMPERATURE: 96.4 F | DIASTOLIC BLOOD PRESSURE: 72 MMHG | RESPIRATION RATE: 16 BRPM | SYSTOLIC BLOOD PRESSURE: 150 MMHG

## 2021-04-26 DIAGNOSIS — I87.2 VENOUS INSUFFICIENCY OF BOTH LOWER EXTREMITIES: Primary | ICD-10-CM

## 2021-04-26 PROCEDURE — 99213 OFFICE O/P EST LOW 20 MIN: CPT | Performed by: FAMILY MEDICINE

## 2021-04-26 PROCEDURE — 99212 OFFICE O/P EST SF 10 MIN: CPT | Performed by: FAMILY MEDICINE

## 2021-04-26 RX ORDER — LISINOPRIL 20 MG/1
20 TABLET ORAL DAILY
COMMUNITY
End: 2021-09-21

## 2021-04-26 NOTE — PROGRESS NOTES
Patient ID: Siri Caraballo is a 60 y o  female Date of Birth 1955     Chief Complaint  Chief Complaint   Patient presents with    No Wound Consult     Patient came for wounds on b/l lower legs that are now healed  Allergies  Sodium hypochlorite, Morphine, and Penicillins    Assessment:    Venous insufficiency of both lower extremities  No wounds today  Discussed the importance of long-term edema control  Fluid balance will be maintained via hemodialysis  Patient also has a significant history of CHF, last EF was noted to be 45-50%  I recommend use of tubigrips bilaterally  If this is not enough compression I would consider getting compression stockings, 20-30 mm hg  Elevate legs whenever possible  Follow up p r n  Or call with questions or concerns      Foot ulcer, left ([address] )  Covered with a callus today  Followup with Dr Cheryl Platt  Will need diabetic shoes       Diagnoses and all orders for this visit:    Venous insufficiency of both lower extremities  -     Wound cleansing and dressings; Future    Other orders  -     lisinopril (ZESTRIL) 20 mg tablet; Take 20 mg by mouth daily              Procedures    Plan:          [REMOVED] Wound 03/15/21 Neuropathic Leg Left;Right; Lower (Removed)   Resolved Date: 04/26/21  Date First Assessed/Time First Assessed: 03/15/21 2030   Pre-Existing Wound: Yes  Primary Wound Type: (c) Neuropathic  Location: Leg  Wound Location Orientation: Left;Right; Lower  Wound Description (Comments): partially weepin    [REMOVED] Wound 03/18/21 Buttocks (Removed)   Resolved Date: 04/26/21  Date First Assessed/Time First Assessed: 03/18/21 1339   Location: Buttocks       [REMOVED] Wound 03/25/21 Incision Back Left; Lower (Removed)   Resolved Date: 04/26/21  Date First Assessed/Time First Assessed: 03/25/21 1030   Primary Wound Type: Incision  Location: Back  Wound Location Orientation: Left; Lower  Wound Description (Comments):  Incision for Procedure       Subjective:      Patient presents for initial evaluation of bilateral lower extremity ulcers and left heel ulcer  Patient was recently hospitalized from 03/15/2021 until 04/05/2021 for CHF exacerbation as well as left lower extremity cellulitis including left foot  Podiatry, Dr Gerrie Dandy, was following patient in the hospital   Once patient was diuresed bilateral lower extremity edema was significantly reduced and patient reports no drainage  During patient's hospitalization she was also noted to have worsening kidney failure and is now on dialysis Tuesdays, Thursdays, and Saturdays  Patient is currently not wearing any diabetic shoes, does wear regular sneakers  No drainage from the foot wound  Patient does reports of itching of bilateral lower extremities  She states that the visiting nurse insist on using 1 topical cream but states that her own cream has always worked better and patient has tolerated it better      The following portions of the patient's history were reviewed and updated as appropriate:   She  has a past medical history of Cardiac disease, CHF (congestive heart failure) ([facility] ), Coronary artery disease, Diabetes mellitus ([address] ), Hyperlipidemia, Hypertension, Renal disorder, and TIA (transient ischemic attack)    She   Patient Active Problem List    Diagnosis Date Noted    Acute kidney injury superimposed on chronic kidney disease ([facility] ) 03/16/2021    [facility] 03/16/2021    Hyperkalemia 03/16/2021    Anemia due to stage 4 chronic kidney disease ([facility] ) 03/15/2021    Cellulitis of both lower extremities 03/15/2021    Non compliance w medication regimen 03/15/2021    Venous insufficiency of both lower extremities 08/12/2020    Syncope and collapse 01/07/2020    Acute on chronic combined systolic and diastolic congestive heart failure ([address] ) 01/07/2020    Essential hypertension 01/07/2020    Coronary artery disease involving native coronary artery of native heart without angina pectoris 2020    Type 2 diabetes mellitus with stage 4 chronic kidney disease, with long-term current use of insulin ([facility] ) 2020    Lower extremity cellulitis 2020    Cerebral aneurysm without rupture 2019    Stage 4 chronic kidney disease ([facility] ) 2018    Class 3 severe obesity with body mass index (BMI) of 45 0 to 49 9 in adult [facility]) 2015     She  has a past surgical history that includes Coronary angioplasty with stent; Cardiac surgery;  section; CT needle biopsy kidney (3/25/2021); and IR tunneled dialysis catheter placement (3/30/2021)  She  reports that she has quit smoking  She quit after 40 00 years of use  She has never used smokeless tobacco  She reports that she does not drink alcohol or use drugs    Current Outpatient Medications   Medication Sig Dispense Refill    acetaminophen (TYLENOL) 325 mg tablet Take 2 tablets (650 mg total) by mouth every 6 (six) hours as needed for mild pain, headaches or fever  0    amLODIPine (NORVASC) 2 5 mg tablet Take 2 5 mg by mouth daily      aspirin (ECOTRIN LOW STRENGTH) 81 mg EC tablet Take 81 mg by mouth daily      atorvastatin (LIPITOR) 40 mg tablet Take 40 mg by mouth daily      calcium acetate (PHOSLO) capsule Take 1 capsule (667 mg total) by mouth 3 (three) times a day with meals 90 capsule 1    carvedilol (COREG) 12 5 mg tablet Take 1 tablet (12 5 mg total) by mouth 2 (two) times a day 60 tablet 1    clopidogrel (PLAVIX) 75 mg tablet Take 75 mg by mouth daily      insulin glargine (Toujeo SoloStar) 300 units/mL CONCENTRATED U-300 injection pen (1-unit dial) Inject 25 Units under the skin daily before breakfast 4 5 mL 0    insulin lispro (HumaLOG) 100 units/mL injection Inject 3 Units under the skin 3 (three) times a day with meals 3 mL 1    lisinopril (ZESTRIL) 20 mg tablet Take 20 mg by mouth daily      nitroglycerin (NITROSTAT) 0 4 mg SL tablet Place 0 4 mg under the tongue      midodrine (PROAMATINE) 10 MG tablet Take 1 tablet (10 mg total) by mouth Before Dialysis (Please give 30-45 minutes prior to dialysis on April 3rd please) (Patient not taking: Reported on 4/26/2021) 30 tablet 1     No current facility-administered medications for this visit  She is allergic to sodium hypochlorite; morphine; and penicillins       Review of Systems   Constitutional: Negative for chills and fever  HENT: Negative for congestion and sneezing  Respiratory: Negative for cough  Cardiovascular: Positive for leg swelling  Musculoskeletal: Positive for gait problem  Psychiatric/Behavioral: Negative for agitation  Objective:            /72   Pulse 74   Temp (!) 96 4 °F (35 8 °C)   Resp 16     Physical Exam  Constitutional:       General: She is not in acute distress  Appearance: Normal appearance  She is obese  She is not ill-appearing, toxic-appearing or diaphoretic  HENT:      Head: Normocephalic and atraumatic  Right Ear: External ear normal       Left Ear: External ear normal    Eyes:      Conjunctiva/sclera: Conjunctivae normal    Neck:      Musculoskeletal: Neck supple  Cardiovascular:      Pulses:           Dorsalis pedis pulses are detected w/ Doppler on the right side and detected w/ Doppler on the left side  Posterior tibial pulses are detected w/ Doppler on the right side and detected w/ Doppler on the left side  Pulmonary:      Effort: Pulmonary effort is normal  No respiratory distress  Comments: On O2 via NC  Musculoskeletal:      Right lower leg: Edema present  Left lower leg: Edema present  Skin:     Comments: See wound assessment   Neurological:      Mental Status: She is alert  Psychiatric:         Mood and Affect: Mood normal          Behavior: Behavior normal            Wound Instructions:  Orders Placed This Encounter   Procedures    Wound cleansing and dressings     Callus on left heel: Now stable  B/L leg wounds: closed/now healed       Follow up with Dr Anitha Crocker (podiatrist)  Discontinue use of ACE wrap for now  Wear daily lite compression  Moisturize b/l legs daily  Wear Spandigrip on b/l lower legs  Elastic Tubular Stocking/Spandipgrip: size F (able to order spandigrip online/[employer])    Tubular elastic bandage: Apply from base of toes to behind the knee  Apply in AM, may remove for sleep  Avoid prolonged standing in one place  Elevate legs above the level of the heart when sitting or as much as possible  If Spandigrip compression not enough to control swelling, wear compression stockings (on in am, off at bedtime) with no more than 20-30mmHg compression  Standing Status:   Future     Standing Expiration Date:   4/26/2022        Diagnosis ICD-10-CM Associated Orders   1   Venous insufficiency of both lower extremities  I87 2 Wound cleansing and dressings

## 2021-04-26 NOTE — ASSESSMENT & PLAN NOTE
No wounds today  Discussed the importance of long-term edema control  Fluid balance will be maintained via hemodialysis  Patient also has a significant history of CHF, last EF was noted to be 45-50%  I recommend use of tubigrips bilaterally  If this is not enough compression I would consider getting compression stockings, 20-30 mm hg  Elevate legs whenever possible  Follow up p r n   Or call with questions or concerns

## 2021-04-26 NOTE — PATIENT INSTRUCTIONS
Orders Placed This Encounter   Procedures    Wound cleansing and dressings     Callus on left heel: Now stable  B/L leg wounds: closed/now healed  Follow up with Dr Patrick Maynard (podiatrist)  Discontinue use of ACE wrap for now  Wear daily lite compression  Moisturize b/l legs daily  Wear Spandigrip on b/l lower legs  Elastic Tubular Stocking/Spandipgrip: size F (able to order spandigrip online/[employer])    Tubular elastic bandage: Apply from base of toes to behind the knee  Apply in AM, may remove for sleep  Avoid prolonged standing in one place  Elevate legs above the level of the heart when sitting or as much as possible  If Spandigrip compression not enough to control swelling, wear compression stockings (on in am, off at bedtime) with no more than 20-30mmHg compression       Standing Status:   Future     Standing Expiration Date:   4/26/2022

## 2021-05-10 DIAGNOSIS — N18.4 TYPE 2 DIABETES MELLITUS WITH STAGE 4 CHRONIC KIDNEY DISEASE, WITH LONG-TERM CURRENT USE OF INSULIN (HCC): Primary | ICD-10-CM

## 2021-05-10 DIAGNOSIS — Z79.4 TYPE 2 DIABETES MELLITUS WITH STAGE 4 CHRONIC KIDNEY DISEASE, WITH LONG-TERM CURRENT USE OF INSULIN (HCC): Primary | ICD-10-CM

## 2021-05-10 DIAGNOSIS — E11.22 TYPE 2 DIABETES MELLITUS WITH STAGE 4 CHRONIC KIDNEY DISEASE, WITH LONG-TERM CURRENT USE OF INSULIN (HCC): Primary | ICD-10-CM

## 2021-05-11 ENCOUNTER — TELEPHONE (OUTPATIENT)
Dept: VASCULAR SURGERY | Facility: CLINIC | Age: 66
End: 2021-05-11

## 2021-05-11 NOTE — TELEPHONE ENCOUNTER
Called pt to schedule - left message     From: Briannashannan Guerra <[email or web address]   Sent: Monday, May 10, 2021 8:51 AM  To: Michael Sumner <[email or web address]; Wilma Weinberg <Dayanna  [email or web address]; Shan Quinn <[email or web address]  Cc: Adamaris Romero <Yoko  [email or web address] Johana Pfleegor <[email or web address]; Eugenie Harper <Eugenie  [email or web address]; Nathalie Rue <Chelsea  [email or web address]; Ferrol Billowitch <Ferrol  [email or web address]  Subject: [EXTERNAL] new CVC pt MF at [facility]! Based upon the critical issue with [employer] targeting Healthcare systems ALL attachments and links from this email should be heavily scrutinized  Hi All,    We have a new CVC pt, Yon Cassidy, at [facility] who needs to start vascular access planning  Nephrologist is Dr Saman Neely and HD schedule is TTS 1st shift  Please contact the pt to schedule vein mapping and surgical consult appts  The pt works full time, so if you have difficulty reaching her, please contact the clinic at [phone] for assistance       Pt details:    Marko Casey  1955    [address] [address]  [phone]  (A)  [phone] (M)    Thank you,  Rogerio Jolly

## 2021-05-12 ENCOUNTER — IMMUNIZATIONS (OUTPATIENT)
Dept: FAMILY MEDICINE CLINIC | Facility: HOSPITAL | Age: 66
End: 2021-05-12

## 2021-05-12 DIAGNOSIS — Z23 ENCOUNTER FOR IMMUNIZATION: Primary | ICD-10-CM

## 2021-05-12 PROCEDURE — 0002A SARS-COV-2 / COVID-19 MRNA VACCINE (PFIZER-BIONTECH) 30 MCG: CPT

## 2021-05-12 PROCEDURE — 91300 SARS-COV-2 / COVID-19 MRNA VACCINE (PFIZER-BIONTECH) 30 MCG: CPT

## 2021-06-14 DIAGNOSIS — I10 ESSENTIAL HYPERTENSION: ICD-10-CM

## 2021-06-14 DIAGNOSIS — I25.10 CORONARY ARTERY DISEASE INVOLVING NATIVE CORONARY ARTERY OF NATIVE HEART WITHOUT ANGINA PECTORIS: ICD-10-CM

## 2021-06-14 RX ORDER — CARVEDILOL 12.5 MG/1
12.5 TABLET ORAL 2 TIMES DAILY
Qty: 60 TABLET | Refills: 5 | Status: CANCELLED | OUTPATIENT
Start: 2021-06-14

## 2021-07-02 ENCOUNTER — HOSPITAL ENCOUNTER (OUTPATIENT)
Dept: NON INVASIVE DIAGNOSTICS | Facility: HOSPITAL | Age: 66
Discharge: HOME | End: 2021-07-02
Attending: SURGERY
Payer: COMMERCIAL

## 2021-07-02 DIAGNOSIS — Z79.4 TYPE 2 DIABETES MELLITUS WITH STAGE 4 CHRONIC KIDNEY DISEASE, WITH LONG-TERM CURRENT USE OF INSULIN (HCC): ICD-10-CM

## 2021-07-02 DIAGNOSIS — N18.4 TYPE 2 DIABETES MELLITUS WITH STAGE 4 CHRONIC KIDNEY DISEASE, WITH LONG-TERM CURRENT USE OF INSULIN (HCC): ICD-10-CM

## 2021-07-02 DIAGNOSIS — E11.22 TYPE 2 DIABETES MELLITUS WITH STAGE 4 CHRONIC KIDNEY DISEASE, WITH LONG-TERM CURRENT USE OF INSULIN (HCC): ICD-10-CM

## 2021-07-02 PROCEDURE — 3066F NEPHROPATHY DOC TX: CPT | Performed by: SURGERY

## 2021-07-02 PROCEDURE — 93985 DUP-SCAN HEMO COMPL BI STD: CPT

## 2021-07-02 PROCEDURE — 93985 DUP-SCAN HEMO COMPL BI STD: CPT | Performed by: SURGERY

## 2021-07-09 ENCOUNTER — TELEPHONE (OUTPATIENT)
Dept: VASCULAR SURGERY | Facility: CLINIC | Age: 66
End: 2021-07-09

## 2021-07-13 ENCOUNTER — APPOINTMENT (EMERGENCY)
Dept: RADIOLOGY | Facility: HOSPITAL | Age: 66
End: 2021-07-13
Payer: COMMERCIAL

## 2021-07-13 ENCOUNTER — HOSPITAL ENCOUNTER (EMERGENCY)
Facility: HOSPITAL | Age: 66
Discharge: HOME | End: 2021-07-13
Attending: EMERGENCY MEDICINE | Admitting: EMERGENCY MEDICINE
Payer: COMMERCIAL

## 2021-07-13 VITALS
RESPIRATION RATE: 18 BRPM | BODY MASS INDEX: 41.44 KG/M2 | OXYGEN SATURATION: 96 % | WEIGHT: 241.4 LBS | SYSTOLIC BLOOD PRESSURE: 198 MMHG | TEMPERATURE: 97.3 F | HEART RATE: 93 BPM | DIASTOLIC BLOOD PRESSURE: 120 MMHG

## 2021-07-13 DIAGNOSIS — J06.9 VIRAL URI: ICD-10-CM

## 2021-07-13 DIAGNOSIS — R11.0 NAUSEA: Primary | ICD-10-CM

## 2021-07-13 DIAGNOSIS — R42 LIGHTHEADEDNESS: ICD-10-CM

## 2021-07-13 LAB
ALBUMIN SERPL BCP-MCNC: 3.2 G/DL (ref 3.5–5)
ALP SERPL-CCNC: 138 U/L (ref 46–116)
ALT SERPL W P-5'-P-CCNC: 20 U/L (ref 12–78)
ANION GAP SERPL CALCULATED.3IONS-SCNC: 10 MMOL/L (ref 4–13)
AST SERPL W P-5'-P-CCNC: 21 U/L (ref 5–45)
BASOPHILS # BLD AUTO: 0.04 THOUSANDS/ΜL (ref 0–0.1)
BASOPHILS NFR BLD AUTO: 0 % (ref 0–1)
BILIRUB SERPL-MCNC: 0.4 MG/DL (ref 0.2–1)
BUN SERPL-MCNC: 29 MG/DL (ref 5–25)
CALCIUM ALBUM COR SERPL-MCNC: 9.2 MG/DL (ref 8.3–10.1)
CALCIUM SERPL-MCNC: 8.6 MG/DL (ref 8.3–10.1)
CHLORIDE SERPL-SCNC: 107 MMOL/L (ref 100–108)
CO2 SERPL-SCNC: 26 MMOL/L (ref 21–32)
CREAT SERPL-MCNC: 2.82 MG/DL (ref 0.6–1.3)
EOSINOPHIL # BLD AUTO: 0.47 THOUSAND/ΜL (ref 0–0.61)
EOSINOPHIL NFR BLD AUTO: 5 % (ref 0–6)
ERYTHROCYTE [DISTWIDTH] IN BLOOD BY AUTOMATED COUNT: 13.9 % (ref 11.6–15.1)
GFR SERPL CREATININE-BSD FRML MDRD: 17 ML/MIN/1.73SQ M
GLUCOSE SERPL-MCNC: 138 MG/DL (ref 65–140)
HCT VFR BLD AUTO: 40.3 % (ref 34.8–46.1)
HGB BLD-MCNC: 12.8 G/DL (ref 11.5–15.4)
IMM GRANULOCYTES # BLD AUTO: 0.05 THOUSAND/UL (ref 0–0.2)
IMM GRANULOCYTES NFR BLD AUTO: 1 % (ref 0–2)
LYMPHOCYTES # BLD AUTO: 1.16 THOUSANDS/ΜL (ref 0.6–4.47)
LYMPHOCYTES NFR BLD AUTO: 12 % (ref 14–44)
MCH RBC QN AUTO: 30.2 PG (ref 26.8–34.3)
MCHC RBC AUTO-ENTMCNC: 31.8 G/DL (ref 31.4–37.4)
MCV RBC AUTO: 95 FL (ref 82–98)
MONOCYTES # BLD AUTO: 0.41 THOUSAND/ΜL (ref 0.17–1.22)
MONOCYTES NFR BLD AUTO: 4 % (ref 4–12)
NEUTROPHILS # BLD AUTO: 7.45 THOUSANDS/ΜL (ref 1.85–7.62)
NEUTS SEG NFR BLD AUTO: 78 % (ref 43–75)
NRBC BLD AUTO-RTO: 0 /100 WBCS
PLATELET # BLD AUTO: 169 THOUSANDS/UL (ref 149–390)
PMV BLD AUTO: 10.6 FL (ref 8.9–12.7)
POTASSIUM SERPL-SCNC: 4.2 MMOL/L (ref 3.5–5.3)
PROT SERPL-MCNC: 7.2 G/DL (ref 6.4–8.2)
RBC # BLD AUTO: 4.24 MILLION/UL (ref 3.81–5.12)
SODIUM SERPL-SCNC: 143 MMOL/L (ref 136–145)
WBC # BLD AUTO: 9.58 THOUSAND/UL (ref 4.31–10.16)

## 2021-07-13 PROCEDURE — 99285 EMERGENCY DEPT VISIT HI MDM: CPT | Performed by: PHYSICIAN ASSISTANT

## 2021-07-13 PROCEDURE — 99284 EMERGENCY DEPT VISIT MOD MDM: CPT

## 2021-07-13 PROCEDURE — 85025 COMPLETE CBC W/AUTO DIFF WBC: CPT | Performed by: PHYSICIAN ASSISTANT

## 2021-07-13 PROCEDURE — 71046 X-RAY EXAM CHEST 2 VIEWS: CPT

## 2021-07-13 PROCEDURE — 93005 ELECTROCARDIOGRAM TRACING: CPT

## 2021-07-13 PROCEDURE — 80053 COMPREHEN METABOLIC PANEL: CPT | Performed by: PHYSICIAN ASSISTANT

## 2021-07-13 PROCEDURE — 36415 COLL VENOUS BLD VENIPUNCTURE: CPT | Performed by: PHYSICIAN ASSISTANT

## 2021-07-13 RX ORDER — FOLIC ACID/VIT B COMPLEX AND C 0.8 MG
1 TABLET ORAL DAILY
COMMUNITY
Start: 2021-04-23

## 2021-07-13 RX ORDER — CALCIUM ACETATE 667 MG/1
CAPSULE ORAL 3 TIMES DAILY
COMMUNITY
End: 2021-07-14 | Stop reason: SDUPTHER

## 2021-07-13 RX ORDER — AZITHROMYCIN 250 MG/1
TABLET, FILM COATED ORAL EVERY 24 HOURS
COMMUNITY
Start: 2021-07-10 | End: 2021-07-19 | Stop reason: ALTCHOICE

## 2021-07-13 RX ORDER — BENZONATATE 100 MG/1
CAPSULE ORAL
COMMUNITY
Start: 2021-07-12 | End: 2021-07-19 | Stop reason: SINTOL

## 2021-07-13 RX ORDER — LISINOPRIL 10 MG/1
10 TABLET ORAL DAILY
COMMUNITY
Start: 2021-04-20 | End: 2021-07-19 | Stop reason: DRUGHIGH

## 2021-07-13 NOTE — ED PROVIDER NOTES
History  Chief Complaint   Patient presents with    Nausea     To ED with c/o nausea started this morning while in dialysis  States that she was given zofran with no change  Also states that she was hypotensive and they replaced some fluids  73-year-old female with history of insulin-dependent diabetes, hypertension, hyperlipidemia, and end-stage renal disease on dialysis presents emergency department for evaluation of nausea and general malaise beginning this morning, worsening while in dialysis  States that she had episode of hypotension post dialysis and was given IV fluids replacement  She did not receive any vasoactive medications  Additionally she has not taken her antihypertensives this morning  She denies associated fevers but does report over the past 7-8 days having a dry cough with postnasal drip  No chest pain, shortness of breath diarrhea, or vomiting  Prior to Admission Medications   Prescriptions Last Dose Informant Patient Reported? Taking?    B Complex-C-Folic Acid (Liat-Cristy) TABS   Yes No   Sig: Take 1 tablet by mouth daily   acetaminophen (TYLENOL) 325 mg tablet   No No   Sig: Take 2 tablets (650 mg total) by mouth every 6 (six) hours as needed for mild pain, headaches or fever   amLODIPine (NORVASC) 2 5 mg tablet  Self Yes No   Sig: Take 2 5 mg by mouth daily   aspirin (ECOTRIN LOW STRENGTH) 81 mg EC tablet  Self Yes No   Sig: Take 81 mg by mouth daily   atorvastatin (LIPITOR) 40 mg tablet  Self Yes No   Sig: Take 40 mg by mouth daily   azithromycin (ZITHROMAX) 250 mg tablet   Yes Yes   Sig: every 24 hours   benzonatate (TESSALON PERLES) 100 mg capsule   Yes Yes   Si capsule as needed   calcium acetate (PHOSLO) capsule   No No   Sig: Take 1 capsule (667 mg total) by mouth 3 (three) times a day with meals   calcium acetate (PhosLo) capsule   Yes No   Sig: 3 (three) times a day   carvedilol (COREG) 12 5 mg tablet   No No   Sig: Take 1 tablet (12 5 mg total) by mouth 2 (two) times a day   clopidogrel (PLAVIX) 75 mg tablet  Self Yes No   Sig: Take 75 mg by mouth daily   insulin glargine (Toujeo SoloStar) 300 units/mL CONCENTRATED U-300 injection pen (1-unit dial)   No No   Sig: Inject 25 Units under the skin daily before breakfast   insulin lispro (HumaLOG) 100 units/mL injection   No No   Sig: Inject 3 Units under the skin 3 (three) times a day with meals   lisinopril (ZESTRIL) 10 mg tablet   Yes No   Sig: Take 10 mg by mouth daily   lisinopril (ZESTRIL) 20 mg tablet  Self Yes No   Sig: Take 20 mg by mouth daily   midodrine (PROAMATINE) 10 MG tablet   No No   Sig: Take 1 tablet (10 mg total) by mouth Before Dialysis (Please give 30-45 minutes prior to dialysis on  please)   Patient not taking: Reported on 2021   nitroglycerin (NITROSTAT) 0 4 mg SL tablet  Self Yes No   Sig: Place 0 4 mg under the tongue      Facility-Administered Medications: None       Past Medical History:   Diagnosis Date    Cardiac disease     CHF (congestive heart failure) ([facility])     Coronary artery disease     Diabetes mellitus ([facility] [place] [address] )     Hyperlipidemia     Hypertension     Renal disorder     TIA (transient ischemic attack)        Past Surgical History:   Procedure Laterality Date    CARDIAC SURGERY       SECTION      CORONARY ANGIOPLASTY WITH STENT PLACEMENT      CT NEEDLE BIOPSY KIDNEY  3/25/2021    IR TUNNELED DIALYSIS CATHETER PLACEMENT  3/30/2021       History reviewed  No pertinent family history  I have reviewed and agree with the history as documented      E-Cigarette/Vaping    E-Cigarette Use Never User      E-Cigarette/Vaping Substances    Nicotine No     THC No     CBD No     Flavoring No     Other No     Unknown No      Social History     Tobacco Use    Smoking status: Former Smoker     Years: 40 00    Smokeless tobacco: Never Used    Tobacco comment: quit    Vaping Use    Vaping Use: Never used   Substance Use Topics    Alcohol use: Never     Comment: socially    Drug use: No       Review of Systems   Constitutional: Positive for fatigue  Negative for chills, diaphoresis and fever  Eyes: Negative for visual disturbance  Respiratory: Negative for cough and shortness of breath  Cardiovascular: Negative for chest pain and palpitations  Gastrointestinal: Positive for nausea  Negative for abdominal pain, diarrhea and vomiting  Genitourinary: Negative for dysuria, flank pain and frequency  Musculoskeletal: Negative for arthralgias and myalgias  Skin: Negative for color change, rash and wound  Allergic/Immunologic: Negative for immunocompromised state  Neurological: Positive for light-headedness  Negative for dizziness  Hematological: Does not bruise/bleed easily  Psychiatric/Behavioral: Negative for confusion  The patient is not nervous/anxious  Physical Exam  Physical Exam  Vitals reviewed  Constitutional:       General: She is not in acute distress  Appearance: She is well-developed  She is not diaphoretic  HENT:      Head: Normocephalic and atraumatic  Mouth/Throat:      Mouth: Mucous membranes are moist    Eyes:      General: No scleral icterus  Pupils: Pupils are equal, round, and reactive to light  Neck:      Vascular: No JVD  Cardiovascular:      Rate and Rhythm: Normal rate and regular rhythm  Heart sounds: No murmur heard  No friction rub  No gallop  Pulmonary:      Effort: No respiratory distress  Breath sounds: No wheezing or rales  Chest:      Comments: R chest wall dialysis catheter, site C/D  Abdominal:      General: Bowel sounds are normal  There is no distension  Palpations: Abdomen is soft  There is no mass  Tenderness: There is no abdominal tenderness  There is no guarding or rebound  Musculoskeletal:      Right lower leg: Edema present  Left lower leg: Edema present  Skin:     General: Skin is warm and dry        Capillary Refill: Capillary refill takes less than 2 seconds  Coloration: Skin is not pale  Neurological:      Mental Status: She is alert and oriented to person, place, and time     Psychiatric:         Behavior: Behavior normal          Vital Signs  ED Triage Vitals   Temperature Pulse Respirations Blood Pressure SpO2   07/13/21 0942 07/13/21 0946 07/13/21 0946 07/13/21 0946 07/13/21 0946   (!) 97 3 °F (36 3 °C) 79 20 (!) 201/80 99 %      Temp Source Heart Rate Source Patient Position - Orthostatic VS BP Location FiO2 (%)   07/13/21 0942 07/13/21 0946 07/13/21 0946 07/13/21 0946 --   Tympanic Monitor Sitting Left arm       Pain Score       --                  Vitals:    07/13/21 0946 07/13/21 1000 07/13/21 1015 07/13/21 1030   BP: (!) 201/80 (!) 184/74 (!) 184/74 (!) 198/120   Pulse: 79 101 69 93   Patient Position - Orthostatic VS: Sitting            Visual Acuity      ED Medications  Medications - No data to display    Diagnostic Studies  Results Reviewed     Procedure Component Value Units Date/Time    Comprehensive metabolic panel [[id number]]  (Abnormal) Collected: 07/13/21 1004    Lab Status: Final result Specimen: Blood from Arm, Right Updated: 07/13/21 1038     Sodium 143 mmol/L      Potassium 4 2 mmol/L      Chloride 107 mmol/L      CO2 26 mmol/L      ANION GAP 10 mmol/L      BUN 29 mg/dL      Creatinine 2 82 mg/dL      Glucose 138 mg/dL      Calcium 8 6 mg/dL      Corrected Calcium 9 2 mg/dL      AST 21 U/L      ALT 20 U/L      Alkaline Phosphatase 138 U/L      Total Protein 7 2 g/dL      Albumin 3 2 g/dL      Total Bilirubin 0 40 mg/dL      eGFR 17 ml/min/1 73sq m     Narrative:      [place] guidelines for Chronic Kidney Disease (CKD):     Stage 1 with normal or high GFR (GFR > 90 mL/min/1 73 square meters)    Stage 2 Mild CKD (GFR = 60-89 mL/min/1 73 square meters)    Stage 3A Moderate CKD (GFR = 45-59 mL/min/1 73 square meters)    Stage 3B Moderate CKD (GFR = 30-44 mL/min/1 73 square meters)    Stage 4 Severe CKD (GFR = 15-29 mL/min/1 73 square meters)    Stage 5 End Stage CKD (GFR <15 mL/min/1 73 square meters)  Note: GFR calculation is accurate only with a steady state creatinine    CBC and differential [[id number]]  (Abnormal) Collected: 07/13/21 1004    Lab Status: Final result Specimen: Blood from Arm, Right Updated: 07/13/21 1013     WBC 9 58 Thousand/uL      RBC 4 24 Million/uL      Hemoglobin 12 8 g/dL      Hematocrit 40 3 %      MCV 95 fL      MCH 30 2 pg      MCHC 31 8 g/dL      RDW 13 9 %      MPV 10 6 fL      Platelets 505 Thousands/uL      nRBC 0 /100 WBCs      Neutrophils Relative 78 %      Immat GRANS % 1 %      Lymphocytes Relative 12 %      Monocytes Relative 4 %      Eosinophils Relative 5 %      Basophils Relative 0 %      Neutrophils Absolute 7 45 Thousands/µL      Immature Grans Absolute 0 05 Thousand/uL      Lymphocytes Absolute 1 16 Thousands/µL      Monocytes Absolute 0 41 Thousand/µL      Eosinophils Absolute 0 47 Thousand/µL      Basophils Absolute 0 04 Thousands/µL                  XR chest 2 views   ED Interpretation by Kris Hare PA-C (07/13 1043)   No acute disease      Final Result by Laurita Iraheta MD (07/13 1418)      No acute cardiopulmonary disease  Workstation performed: [id number]                    Procedures  Procedures         ED Course                             SBIRT 20yo+      Most Recent Value   SBIRT (22 yo +)   In order to provide better care to our patients, we are screening all of our patients for alcohol and drug use  Would it be okay to ask you these screening questions? No Filed at: 07/13/2021 1005   Initial Alcohol Screen: US AUDIT-C    1  How often do you have a drink containing alcohol?  0 Filed at: 07/13/2021 1005   2  How many drinks containing alcohol do you have on a typical day you are drinking? 0 Filed at: 07/13/2021 1005   3b  FEMALE Any Age, or MALE 65+: How often do you have 4 or more drinks on one occassion?   0 Filed at: 07/13/2021 1005   Audit-C Score  0 Filed at: 07/13/2021 1005   ELLA: How many times in the past year have you    Used an illegal drug or used a prescription medication for non-medical reasons? Never Filed at: 07/13/2021 1005                    [facility]  Number of Diagnoses or Management Options  Lightheadedness: new and requires workup  Nausea: new and requires workup  Viral URI: new and requires workup  Diagnosis management comments:   Labs reassuring  Chest x-ray is clear, doubt bacterial etiology to symptoms  Recommended patient discontinue azithromycin, administer antihypertensives upon return  Amount and/or Complexity of Data Reviewed  Clinical lab tests: ordered and reviewed  Tests in the radiology section of CPT®: ordered and reviewed  Tests in the medicine section of CPT®: ordered and reviewed  Review and summarize past medical records: yes  Independent visualization of images, tracings, or specimens: yes        Disposition  Final diagnoses:   Nausea   Lightheadedness   Viral URI     Time reflects when diagnosis was documented in both MDM as applicable and the Disposition within this note     Time User Action Codes Description Comment    7/13/2021 10:52 AM Promise Nathenh Add [R11 0] Nausea     7/13/2021 10:52 AM Promise Bruch Add [R42] Lightheadedness     7/13/2021 10:53 AM Promise Bruch Add [J06 9] Viral URI       ED Disposition     ED Disposition Condition Date/Time Comment    Discharge Stable Tue Jul 13, 2021 10:52 AM Jimmie Cowden discharge to home/self care              Follow-up Information     Follow up With Specialties Details Why [address] [place] [id number]  [phone]            Discharge Medication List as of 7/13/2021 10:53 AM      CONTINUE these medications which have NOT CHANGED    Details   azithromycin (ZITHROMAX) 250 mg tablet every 24 hours, Starting Sat 7/10/2021, Historical Med      benzonatate (TESSALON PERLES) 100 mg capsule 1 capsule as needed, Historical Med      acetaminophen (TYLENOL) 325 mg tablet Take 2 tablets (650 mg total) by mouth every 6 (six) hours as needed for mild pain, headaches or fever, Starting Mon 4/5/2021, No Print      amLODIPine (NORVASC) 2 5 mg tablet Take 2 5 mg by mouth daily, Historical Med      aspirin (ECOTRIN LOW STRENGTH) 81 mg EC tablet Take 81 mg by mouth daily, Until Discontinued, Historical Med      atorvastatin (LIPITOR) 40 mg tablet Take 40 mg by mouth daily, Until Discontinued, Historical Med      B Complex-C-Folic Acid (Liat-Cristy) TABS Take 1 tablet by mouth daily, Starting Fri 4/23/2021, Historical Med      !! calcium acetate (PHOSLO) capsule Take 1 capsule (667 mg total) by mouth 3 (three) times a day with meals, Starting Mon 4/5/2021, Normal      !! calcium acetate (PhosLo) capsule 3 (three) times a day, Historical Med      carvedilol (COREG) 12 5 mg tablet Take 1 tablet (12 5 mg total) by mouth 2 (two) times a day, Starting Mon 4/5/2021, Normal      clopidogrel (PLAVIX) 75 mg tablet Take 75 mg by mouth daily, Until Discontinued, Historical Med      insulin glargine (Toujeo SoloStar) 300 units/mL CONCENTRATED U-300 injection pen (1-unit dial) Inject 25 Units under the skin daily before breakfast, Starting Mon 4/5/2021, Normal      insulin lispro (HumaLOG) 100 units/mL injection Inject 3 Units under the skin 3 (three) times a day with meals, Starting Mon 4/5/2021, Until Wed 5/5/2021, Normal      !! lisinopril (ZESTRIL) 10 mg tablet Take 10 mg by mouth daily, Starting Tue 4/20/2021, Historical Med      !! lisinopril (ZESTRIL) 20 mg tablet Take 20 mg by mouth daily, Historical Med      midodrine (PROAMATINE) 10 MG tablet Take 1 tablet (10 mg total) by mouth Before Dialysis (Please give 30-45 minutes prior to dialysis on April 3rd please), Starting Mon 4/5/2021, Normal      nitroglycerin (NITROSTAT) 0 4 mg SL tablet Place 0 4 mg under the tongue, Starting Mon 11/5/2018, Historical Med       !! - Potential duplicate medications found  Please discuss with provider  No discharge procedures on file      PDMP Review     None          ED Provider  Electronically Signed by           Hanna Patel PA-C  07/13/21 0489

## 2021-07-14 ENCOUNTER — TELEPHONE (OUTPATIENT)
Dept: VASCULAR SURGERY | Facility: CLINIC | Age: 66
End: 2021-07-14

## 2021-07-14 ENCOUNTER — CONSULT (OUTPATIENT)
Dept: VASCULAR SURGERY | Facility: CLINIC | Age: 66
End: 2021-07-14
Payer: COMMERCIAL

## 2021-07-14 VITALS
DIASTOLIC BLOOD PRESSURE: 88 MMHG | SYSTOLIC BLOOD PRESSURE: 126 MMHG | HEART RATE: 88 BPM | HEIGHT: 64 IN | RESPIRATION RATE: 20 BRPM | WEIGHT: 243 LBS | BODY MASS INDEX: 41.48 KG/M2

## 2021-07-14 DIAGNOSIS — I67.1 CEREBRAL ANEURYSM WITHOUT RUPTURE: ICD-10-CM

## 2021-07-14 DIAGNOSIS — E11.22 TYPE 2 DIABETES MELLITUS WITH STAGE 4 CHRONIC KIDNEY DISEASE, WITH LONG-TERM CURRENT USE OF INSULIN (HCC): ICD-10-CM

## 2021-07-14 DIAGNOSIS — Z79.4 TYPE 2 DIABETES MELLITUS WITH STAGE 4 CHRONIC KIDNEY DISEASE, WITH LONG-TERM CURRENT USE OF INSULIN (HCC): ICD-10-CM

## 2021-07-14 DIAGNOSIS — Z99.2 END-STAGE RENAL DISEASE ON HEMODIALYSIS (HCC): Primary | ICD-10-CM

## 2021-07-14 DIAGNOSIS — N18.6 END-STAGE RENAL DISEASE ON HEMODIALYSIS (HCC): Primary | ICD-10-CM

## 2021-07-14 DIAGNOSIS — N18.4 TYPE 2 DIABETES MELLITUS WITH STAGE 4 CHRONIC KIDNEY DISEASE, WITH LONG-TERM CURRENT USE OF INSULIN (HCC): ICD-10-CM

## 2021-07-14 PROCEDURE — 3008F BODY MASS INDEX DOCD: CPT | Performed by: SURGERY

## 2021-07-14 PROCEDURE — 1036F TOBACCO NON-USER: CPT | Performed by: SURGERY

## 2021-07-14 PROCEDURE — 99244 OFF/OP CNSLTJ NEW/EST MOD 40: CPT | Performed by: SURGERY

## 2021-07-14 RX ORDER — CHLORHEXIDINE GLUCONATE 0.12 MG/ML
15 RINSE ORAL ONCE
Status: CANCELLED | OUTPATIENT
Start: 2021-07-14 | End: 2021-07-14

## 2021-07-14 NOTE — PROGRESS NOTES
Assessment/Plan:    Presents in need of permanent dialysis access  Is currently on hemodialysis via tunneled catheter on the right, after review of her vein mapping and physical exam there is a possibility of an AV fistula and after further intraoperative ultrasound evaluation if it does not appear adequate then a left upper arm dialysis graft will be placed  She has history of coronary stents in the last 2 or 3 years and will need to discontinue her Plavix I am requesting cardiac risk evaluation by Dr Jonathan Clatyon prior to placement of her access  Plan:  After cardiac risk assessment has been obtained will plan a left upper arm AV fistula possible AV graft at the [facility] sometime in the near future     Diagnoses and all orders for this visit:    End-stage renal disease on hemodialysis [facility])  -     Case request operating room: CREATION FISTULA ARTERIOVENOUS (AV) left upper extremity possible AV graft; Standing  -     Basic metabolic panel; Future  -     CBC and Platelet; Future  -     Ambulatory referral to Cardiology; Future  -     Basic metabolic panel  -     CBC and Platelet  -     Case request operating room: CREATION FISTULA ARTERIOVENOUS (AV) left upper extremity possible AV graft    Type 2 diabetes mellitus with stage 4 chronic kidney disease, with long-term current use of insulin ([facility])  -     Ambulatory referral to Vascular Surgery    Cerebral aneurysm without rupture    Other orders  -     Diet NPO; Sips with meds; Standing  -     Void on call to OR; Standing  -     Insert peripheral IV; Standing  -     Nursing Communication CHG bath, have staff wash entire body (neck down) per pre op bathing protocol  Routine, evening prior to, and day of surgery ; Standing  -     Nursing Communication Swab both nares with Povidone-Iodine solution, EXCLUDE if patient has shellfish/Iodine allergy   Routine, day of surgery, on call to OR ; Standing  -     chlorhexidine (PERIDEX) 0 12 % oral rinse 15 mL  -     Place sequential compression device; Standing  -     clindamycin (CLEOCIN) 900 mg in sodium chloride 0 9 % 50 mL IVPB        Subjective:      Patient ID: Toro Casey is a 73 y o  female  Patient is new to our practice and was referred by Dr Tavon Callaway for HD access  Pt had a HD vein mapping on 7/2/21  Pt is Rt hand dominate  Pt denies arm or wrist sx  Pt denies pain, numbness, or tingling in either arm or wrist  Pt denies long term use of IV products  Pt goes to [facility] where they use Rt chest PermCath  HPI    The following portions of the patient's history were reviewed and updated as appropriate: allergies, current medications, past family history, past medical history, past social history, past surgical history and problem list     Review of Systems   Constitutional: Negative  HENT: Positive for postnasal drip and rhinorrhea  Eyes: Negative  Respiratory: Positive for cough  Cardiovascular: Positive for leg swelling (uses compression stockings)  Gastrointestinal: Negative  Endocrine: Negative  Genitourinary: Negative  Musculoskeletal: Negative  Skin: Negative  Allergic/Immunologic: Negative  Neurological: Negative  Hematological: Negative  Psychiatric/Behavioral: Negative  Objective: There were no vitals taken for this visit  Physical Exam      Oriented x3 no evidence of clinical depression  Eyes:  Sclera non-icteric    Skin: normal without evidence of inflammation    Neck is supple carotid pulses equal bilaterally no bruits heard    Chest lungs clear, heart regular rhythm  tourniquet placed bilaterally possible lack of median cubital veins bilaterally, pulses are easily palpable brachial and radial    Neurological exam intact cranial nerves 2-12 grossly intact no gross motor sensory deficits detected        Imaging viewed and reviewed with Patient    Operative Scheduling Information:    Hospital:  [address] [place] [address]    Physician:  Me    Surgery:  Left upper arm AV fistula possible AV graft    Urgency:  Urgent:  standard    Level:  Level 3: Outpatients to be scheduled for elective surgery than can be delayed up to 4 weeks without reasonable expectation of detriment to patient    Case Length:  Normal    Post-op Bed:  Outpatient    OR Table:  Standard    Equipment Needs:  None    Medication Instructions:  Plavix:  Hold for  7-10  days prior to procedure    Hydration:  No    I have reviewed and made appropriate changes to the review of systems input by the medical assistant      Vitals:    21 0904 21 0905   BP: 122/84 126/88   BP Location: Right arm Left arm   Patient Position: Sitting Sitting   Pulse: 88    Resp: 20    Weight: 110 kg (243 lb)    Height: 5' 4" (1 626 m)        Patient Active Problem List   Diagnosis    Syncope and collapse    Stage 4 chronic kidney disease (HCC)    Class 3 severe obesity with body mass index (BMI) of 45 0 to 49 9 in [facility])    Cerebral aneurysm without rupture    Acute on chronic combined systolic and diastolic congestive heart failure ([facility])    Essential hypertension    Coronary artery disease involving native coronary artery of native heart without angina pectoris    Type 2 diabetes mellitus with stage 4 chronic kidney disease, with long-term current use of insulin ([facility])    Lower extremity cellulitis    Venous insufficiency of both lower extremities    Anemia due to stage 4 chronic kidney disease ([facility])    Cellulitis of both lower extremities    Non compliance w medication regimen    Acute kidney injury superimposed on chronic kidney disease (HCC)    Foot ulcer, left ([facility])    Hyperkalemia    End-stage renal disease on hemodialysis ([facility] [address] )       Past Surgical History:   Procedure Laterality Date    CARDIAC SURGERY       SECTION      CORONARY ANGIOPLASTY WITH STENT PLACEMENT      CT NEEDLE BIOPSY KIDNEY  3/25/2021    IR TUNNELED DIALYSIS CATHETER PLACEMENT  3/30/2021       No family history on file  Social History     Socioeconomic History    Marital status:      Spouse name: Not on file    Number of children: Not on file    Years of education: Not on file    Highest education level: Not on file   Occupational History    Not on file   Tobacco Use    Smoking status: Former Smoker     Years: 40 00    Smokeless tobacco: Never Used    Tobacco comment: quit 1980   Vaping Use    Vaping Use: Never used   Substance and Sexual Activity    Alcohol use: Never     Comment: socially    Drug use: No    Sexual activity: Not on file   Other Topics Concern    Not on file   Social History Narrative    Not on file     Social Determinants of Health     Financial Resource Strain:     Difficulty of Paying Living Expenses:    Food Insecurity:     Worried About [address] in the Last Year:     [address] in the Last Year:    Transportation Needs:     Lack of Transportation (Medical):  Lack of Transportation (Non-Medical):    Physical Activity:     Days of Exercise per Week:     Minutes of Exercise per Session:    Stress:     Feeling of Stress :    Social Connections:     Frequency of Communication with Friends and Family:     Frequency of Social Gatherings with Friends and Family:     Attends Spiritism Services:     Active Member of Clubs or Organizations:     Attends Club or Organization Meetings:     Marital Status:    Intimate Partner Violence:     Fear of Current or Ex-Partner:     Emotionally Abused:     Physically Abused:     Sexually Abused:         Allergies   Allergen Reactions    Sodium Hypochlorite Other (See Comments)     Nausea and vomiting    Morphine     Penicillins          Current Outpatient Medications:     acetaminophen (TYLENOL) 325 mg tablet, Take 2 tablets (650 mg total) by mouth every 6 (six) hours as needed for mild pain, headaches or fever, Disp:  , Rfl: 0    aspirin (ECOTRIN LOW STRENGTH) 81 mg EC tablet, Take 81 mg by mouth daily, Disp: , Rfl:     atorvastatin (LIPITOR) 40 mg tablet, Take 40 mg by mouth daily, Disp: , Rfl:     calcium acetate (PHOSLO) capsule, Take 1 capsule (667 mg total) by mouth 3 (three) times a day with meals, Disp: 90 capsule, Rfl: 1    carvedilol (COREG) 12 5 mg tablet, Take 1 tablet (12 5 mg total) by mouth 2 (two) times a day, Disp: 60 tablet, Rfl: 1    clopidogrel (PLAVIX) 75 mg tablet, Take 75 mg by mouth daily, Disp: , Rfl:     insulin glargine (Toujeo SoloStar) 300 units/mL CONCENTRATED U-300 injection pen (1-unit dial), Inject 25 Units under the skin daily before breakfast, Disp: 4 5 mL, Rfl: 0    insulin lispro (HumaLOG) 100 units/mL injection, Inject 3 Units under the skin 3 (three) times a day with meals, Disp: 3 mL, Rfl: 1    lisinopril (ZESTRIL) 20 mg tablet, Take 20 mg by mouth daily, Disp: , Rfl:     amLODIPine (NORVASC) 2 5 mg tablet, Take 2 5 mg by mouth daily (Patient not taking: Reported on 7/14/2021), Disp: , Rfl:     azithromycin (ZITHROMAX) 250 mg tablet, every 24 hours (Patient not taking: Reported on 7/14/2021), Disp: , Rfl:     B Complex-C-Folic Acid (Liat-Cristy) TABS, Take 1 tablet by mouth daily (Patient not taking: Reported on 7/14/2021), Disp: , Rfl:     benzonatate (TESSALON PERLES) 100 mg capsule, 1 capsule as needed (Patient not taking: Reported on 7/14/2021), Disp: , Rfl:     lisinopril (ZESTRIL) 10 mg tablet, Take 10 mg by mouth daily (Patient not taking: Reported on 7/14/2021), Disp: , Rfl:     midodrine (PROAMATINE) 10 MG tablet, Take 1 tablet (10 mg total) by mouth Before Dialysis (Please give 30-45 minutes prior to dialysis on April 3rd please) (Patient not taking: Reported on 4/26/2021), Disp: 30 tablet, Rfl: 1    nitroglycerin (NITROSTAT) 0 4 mg SL tablet, Place 0 4 mg under the tongue (Patient not taking: Reported on 7/14/2021), Disp: , Rfl:

## 2021-07-14 NOTE — PATIENT INSTRUCTIONS
Presents in need of permanent dialysis access  Is currently on hemodialysis via tunneled catheter on the right, after review of her vein mapping and physical exam there is a possibility of an AV fistula and after further intraoperative ultrasound evaluation if it does not appear adequate then a left upper arm dialysis graft will be placed  She has history of coronary stents in the last 2 or 3 years and will need to discontinue her Plavix I am requesting cardiac risk evaluation by Dr Johnny Curtis prior to placement of her access        Plan:  After cardiac risk assessment has been obtained will plan a left upper arm AV fistula possible AV graft at the [facility] [place] sometime in the near future

## 2021-07-14 NOTE — ASSESSMENT & PLAN NOTE
Lab Results   Component Value Date    EGFR 17 07/13/2021    EGFR 9 04/05/2021    EGFR 12 04/04/2021    CREATININE 2 82 (H) 07/13/2021    CREATININE 4 84 (H) 04/05/2021    CREATININE 3 65 (H) 04/04/2021   Presents in need of permanent dialysis access  Is currently on hemodialysis via tunneled catheter on the right, after review of her vein mapping and physical exam there is a possibility of an AV fistula and after further intraoperative ultrasound evaluation if it does not appear adequate then a left upper arm dialysis graft will be placed  She has history of coronary stents in the last 2 or 3 years and will need to discontinue her Plavix I am requesting cardiac risk evaluation by Dr Kaitlin Sánchez prior to placement of her access        Plan:  After cardiac risk assessment has been obtained will plan a left upper arm AV fistula possible AV graft at the [address] sometime in the near future

## 2021-07-14 NOTE — TELEPHONE ENCOUNTER
Patient is schedule for Cardiology Clearance on 07/19/2021 at 11:00 am with Dr Sebastian Mckeon at [facility]

## 2021-07-14 NOTE — TELEPHONE ENCOUNTER
REMINDER: Under Reason For Call, comments MUST be formatted as:   (Surgeon's Initials) / (Procedure)    Physician / Hospital: Talib Sotomayor (NPI: [id number]) / Zeyad Fatuma (Tax: [id number] / NPI: [id number])    Procedure: Left upper arm AV fistula possible AV graft    Level: 3 - Route clearance(s) to [facility] Clearance Pool     Equipment / Rep Needs: No    Assistant Surgeon: No    Allergies: Sodium hypochlorite, Morphine, and Penicillins    Instructions Given: Fistulagram / Venogram Instructions    Blood Thinners / Medication Hold: Hold Rx - Plavix (Clopidogrel) , 7-10 days prior to procedure  Hydration Required: Patient does not require hydration  Dialysis: Yes  Where: [facility] Days: Tue-Thur-Sat    Consent: I certify that patient has signed, printed, timed, and dated their surgery consent  I certify that BOTH sides of the completed surgery consent have been scanned into the patient's Epic chart by myself on 7/14/2021  Yes, I have LABELED the consent in Epic as Consent for Vascular Procedure  Clearances     Levels   1-3 ROUTE this encounter to [facility] Clearance Pool   AND   SEND Clearance Form(s) to Vascular Nursing e-mail group   Level   4 ROUTE this encounter to [facility] Surgery Coordinator Pool  AND   SEND Clearance Form(s) to Vascular Surgery Schedulers e-mail group     (1) ONE CLEARANCE NEEDED - Patient requires Cardiology  clearance  Spoke with, Howard Park, at [facility] Cardiology   CLEARING OFFICE MANAGEMENT ALERTED - Per office staff Howard Park, there are NO appointments available in the timeframe needed  Requested clearing office staff will reach out to Dr Stanley Navarro to for scheduling assistance  Office contact information P: [phone] Reanna Siad: [phone]    Yes, I have ROUTED this encounter to [facility] Surgery Coordinator and/or [facility] Clearance Pool

## 2021-07-15 LAB
ATRIAL RATE: 67 BPM
P AXIS: 21 DEGREES
PR INTERVAL: 162 MS
QRS AXIS: 37 DEGREES
QRSD INTERVAL: 96 MS
QT INTERVAL: 454 MS
QTC INTERVAL: 479 MS
T WAVE AXIS: 26 DEGREES
VENTRICULAR RATE: 67 BPM

## 2021-07-15 PROCEDURE — 93010 ELECTROCARDIOGRAM REPORT: CPT | Performed by: INTERNAL MEDICINE

## 2021-07-19 ENCOUNTER — OFFICE VISIT (OUTPATIENT)
Dept: CARDIOLOGY CLINIC | Facility: CLINIC | Age: 66
End: 2021-07-19
Payer: COMMERCIAL

## 2021-07-19 ENCOUNTER — PREP FOR PROCEDURE (OUTPATIENT)
Dept: VASCULAR SURGERY | Facility: CLINIC | Age: 66
End: 2021-07-19

## 2021-07-19 VITALS
HEART RATE: 67 BPM | BODY MASS INDEX: 41.32 KG/M2 | WEIGHT: 242 LBS | HEIGHT: 64 IN | SYSTOLIC BLOOD PRESSURE: 166 MMHG | DIASTOLIC BLOOD PRESSURE: 70 MMHG

## 2021-07-19 DIAGNOSIS — I25.10 CORONARY ARTERY DISEASE INVOLVING NATIVE CORONARY ARTERY OF NATIVE HEART WITHOUT ANGINA PECTORIS: Primary | ICD-10-CM

## 2021-07-19 DIAGNOSIS — N18.6 END-STAGE RENAL DISEASE ON HEMODIALYSIS (HCC): ICD-10-CM

## 2021-07-19 DIAGNOSIS — Z99.2 END-STAGE RENAL DISEASE ON HEMODIALYSIS (HCC): ICD-10-CM

## 2021-07-19 PROCEDURE — 3066F NEPHROPATHY DOC TX: CPT | Performed by: INTERNAL MEDICINE

## 2021-07-19 PROCEDURE — 1036F TOBACCO NON-USER: CPT | Performed by: INTERNAL MEDICINE

## 2021-07-19 PROCEDURE — 3008F BODY MASS INDEX DOCD: CPT | Performed by: INTERNAL MEDICINE

## 2021-07-19 PROCEDURE — 99214 OFFICE O/P EST MOD 30 MIN: CPT | Performed by: INTERNAL MEDICINE

## 2021-07-19 NOTE — H&P (VIEW-ONLY)
medication regimen    Acute kidney injury superimposed on chronic kidney disease [facility])     Lab Results   Component Value Date    EGFR 17 07/13/2021    EGFR 9 04/05/2021    EGFR 12 04/04/2021    CREATININE 2 82 (H) 07/13/2021    CREATININE 4 84 (H) 04/05/2021    CREATININE 3 65 (H) 04/04/2021              Foot ulcer, left (HCC)    Hyperkalemia    End-stage renal disease on hemodialysis [facility])     Lab Results   Component Value Date    EGFR 17 07/13/2021    EGFR 9 04/05/2021    EGFR 12 04/04/2021    CREATININE 2 82 (H) 07/13/2021    CREATININE 4 84 (H) 04/05/2021    CREATININE 3 65 (H) 04/04/2021                        Past Medical History:   Diagnosis Date    Cardiac disease     CHF [facility]congestive heart failure) ([facility] )     Coronary artery disease     Diabetes mellitus ([facility] )     Hyperlipidemia     Hypertension     Renal disorder     TIA (transient ischemic attack)      Social History     Socioeconomic History    Marital status:      Spouse name: Not on file    Number of children: Not on file    Years of education: Not on file    Highest education level: Not on file   Occupational History    Not on file   Tobacco Use    Smoking status: Former Smoker     Years: 40 00    Smokeless tobacco: Never Used    Tobacco comment: quit 1980   Vaping Use    Vaping Use: Never used   Substance and Sexual Activity    Alcohol use: Never     Comment: socially    Drug use: No    Sexual activity: Not on file   Other Topics Concern    Not on file   Social History Narrative    Not on file     Social Determinants of Health     Financial Resource Strain:     Difficulty of Paying Living Expenses:    Food Insecurity:     Worried About [address] in the Last Year:     [address] [place] in the Last Year:    Transportation Needs:     Lack of Transportation (Medical):      Lack of Transportation (Non-Medical):    Physical Activity:     Days of Exercise per Week:     Minutes of Exercise per Session:    Stress:     Feeling of Stress :    Social Connections:     Frequency of Communication with Friends and Family:     Frequency of Social Gatherings with Friends and Family:     Attends Protestant Services:     Active Member of Clubs or Organizations:     Attends Club or Organization Meetings:     Marital Status:    Intimate Partner Violence:     Fear of Current or Ex-Partner:     Emotionally Abused:     Physically Abused:     Sexually Abused:       No family history on file    Past Surgical History:   Procedure Laterality Date    CARDIAC SURGERY       SECTION      CORONARY ANGIOPLASTY WITH STENT PLACEMENT      CT NEEDLE BIOPSY KIDNEY  3/25/2021    IR TUNNELED DIALYSIS CATHETER PLACEMENT  3/30/2021       Current Outpatient Medications:     acetaminophen (TYLENOL) 325 mg tablet, Take 2 tablets (650 mg total) by mouth every 6 (six) hours as needed for mild pain, headaches or fever, Disp:  , Rfl: 0    aspirin (ECOTRIN LOW STRENGTH) 81 mg EC tablet, Take 81 mg by mouth daily, Disp: , Rfl:     atorvastatin (LIPITOR) 40 mg tablet, Take 40 mg by mouth daily, Disp: , Rfl:     calcium acetate (PHOSLO) capsule, Take 1 capsule (667 mg total) by mouth 3 (three) times a day with meals, Disp: 90 capsule, Rfl: 1    carvedilol (COREG) 12 5 mg tablet, Take 1 tablet (12 5 mg total) by mouth 2 (two) times a day, Disp: 60 tablet, Rfl: 1    clopidogrel (PLAVIX) 75 mg tablet, Take 75 mg by mouth daily, Disp: , Rfl:     insulin glargine (Toujeo SoloStar) 300 units/mL CONCENTRATED U-300 injection pen (1-unit dial), Inject 25 Units under the skin daily before breakfast, Disp: 4 5 mL, Rfl: 0    insulin lispro (HumaLOG) 100 units/mL injection, Inject 3 Units under the skin 3 (three) times a day with meals, Disp: 3 mL, Rfl: 1    lisinopril (ZESTRIL) 20 mg tablet, Take 20 mg by mouth daily , Disp: , Rfl:     nitroglycerin (NITROSTAT) 0 4 mg SL tablet, Place 0 4 mg under the tongue , Disp: , Rfl:     B Complex-C-Folic Acid (Gómez) TABS, Take 1 tablet by mouth daily (Patient not taking: Reported on 7/14/2021), Disp: , Rfl:     midodrine (PROAMATINE) 10 MG tablet, Take 1 tablet (10 mg total) by mouth Before Dialysis (Please give 30-45 minutes prior to dialysis on April 3rd please) (Patient not taking: Reported on 7/19/2021), Disp: 30 tablet, Rfl: 1  Allergies   Allergen Reactions    Sodium Hypochlorite Other (See Comments)     Nausea and vomiting    Benzonatate Other (See Comments)     felt drunk    Morphine     Penicillins     Other Rash     Chlorine bleach       Labs:     Chemistry        Component Value Date/Time     03/10/2015 1503    K 4 2 07/13/2021 1004    K 4 3 03/10/2015 1503     07/13/2021 1004     03/10/2015 1503    CO2 26 07/13/2021 1004    CO2 27 03/10/2015 1503    BUN 29 (H) 07/13/2021 1004    BUN 22 03/10/2015 1503    CREATININE 2 82 (H) 07/13/2021 1004    CREATININE 0 65 03/10/2015 1503        Component Value Date/Time    CALCIUM 8 6 07/13/2021 1004    CALCIUM 8 8 03/10/2015 1503    ALKPHOS 138 (H) 07/13/2021 1004    ALKPHOS 120 (H) 03/10/2015 1503    AST 21 07/13/2021 1004    AST 13 03/10/2015 1503    ALT 20 07/13/2021 1004    ALT 28 03/10/2015 1503    BILITOT 0 50 03/10/2015 1503            No results found for: CHOL  No results found for: HDL  No results found for: LDLCALC  No results found for: TRIG  No results found for: CHOLHDL    Imaging: XR chest 2 views    Result Date: 7/13/2021  Narrative: CHEST INDICATION:   cough x 1 week  COMPARISON:  04/02/2021 EXAM PERFORMED/VIEWS:  XR CHEST PA & LATERAL  The frontal view was performed utilizing dual energy radiographic technique  Images: 4 FINDINGS: Cardiomediastinal silhouette appears unremarkable  Dialysis catheter are again noted on the right  Coronary artery stents are present  The lungs are clear  No pneumothorax or pleural effusion  Osseous structures appear within normal limits for patient age       Impression: No acute cardiopulmonary disease  Workstation performed: [id number]     VAS vessel mapping for hemodialysis upper    Result Date: 7/2/2021  Narrative:  [facility] REPORT CLINICAL: Indications:  Pre-Op evaluation for future dialysis AVF  Patient has chronic kidney disease  Patient is Right handed  FINDINGS:  Right                   Diameter AP (mm)  PSV (cm/s)  Subclavian                                        73  Brachial Artery                      4 8          89  Dist  Radial Artery                  1 5          30  Prox  Radial                                      68  Dist  Ulnar Artery                               101  Mid Radial                                        41  Prox  Ulnar                                       94  Mid  Ulnar                                       110  Median Cubital V                     4 5              Bas Mid Arm                          2 9              Basilic Lower Arm                    4 0              Bas AC                               2 2              Basilic Upper Forearm                1 5              Bas Mid Forearm                      1 5              Ceph Upper                           4 9              Ceph Mid Arm                         3 6              Cephalic Lower Arm                   4 1              Ceph AC                              3 6              Cephalic Upper Forearm               3 1              Ceph Mid Forearm                     2 6              Cephalic Lower Forearm               2 9              Ceph Wrist                           2 0               Left                    Diameter AP (mm)  PSV (cm/s)  Subclavian                                        70  Brachial Artery                      4 1          86  Dist  Radial Artery                  1 9          65  Axillary                                          86  Prox   Radial                                      82  Dist  Ulnar Artery                   1 8          74  Mid Radial 65  Prox  Ulnar                                      106  Mid  Ulnar                                        76  Bas Mid Arm                          3 3              Basilic Lower Arm                    2 5              Bas AC                               2 2              Basilic Upper Forearm                2 1              Bas Mid Forearm                      2 4              Basilic Lower Forearm                2 2              Ceph Upper                           3 6              Ceph Mid Arm                         2 4              Cephalic Lower Arm                   3 1              Ceph AC                              4 3              Cephalic Upper Forearm               2 3              Ceph Mid Forearm                     2 1              Cephalic Lower Forearm               2 9              Ceph Wrist                           2 3                 CONCLUSION: Impression RIGHT ARM: The cephalic and basilic veins communicate with the median cubital vein  The cephalic vein is patent and remains >2mm in diameter throughout the arm The basilic vein is patent and remains >2mm in diameter from the mid upper arm to the fossa  The brachial artery measures 4 8mm in its greatest diameter and bifurcates at the mid upper arm  The subclavian, axillary and brachial arteries are widely patent  The IJV, subclavian, axillary and brachial veins are patent  LEFT ARM: The cephalic and basilic veins does not communicate with the median cubital vein  The cephalic vein is patent and remains >2mm in diameter throughout the arm The basilic vein is patent and remains >2mm in diameter throughout the arm The brachial artery measures 4 1mm in its greatest diameter and bifurcates at the upper arm  The ulnar artery then also bifurcates at the proximal forearm  The subclavian, axillary and brachial arteries are widely patent  The IJV, subclavian, axillary and brachial veins are patent    SIGNATURE: Electronically Signed by: Mirta Yousif MD on 2021-07-02 03:57:09 PM          Review of Systems   Constitutional: Positive for malaise/fatigue  HENT: Negative  Eyes: Negative  Cardiovascular: Negative  Respiratory: Negative  Endocrine: Negative  Hematologic/Lymphatic: Negative  Skin: Negative  Musculoskeletal: Negative  Gastrointestinal: Negative  Genitourinary: Negative  Neurological: Negative  Psychiatric/Behavioral: Negative  Allergic/Immunologic: Negative  Vitals:    07/19/21 1108   BP: 166/70   Pulse: 67           Physical Exam  Vitals and nursing note reviewed  Constitutional:       Appearance: Normal appearance  HENT:      Head: Normocephalic  Nose: Nose normal       Mouth/Throat:      Mouth: Mucous membranes are moist    Eyes:      General: No scleral icterus  Conjunctiva/sclera: Conjunctivae normal    Cardiovascular:      Rate and Rhythm: Normal rate and regular rhythm  Heart sounds: No murmur heard  No gallop  Pulmonary:      Effort: Pulmonary effort is normal  No respiratory distress  Breath sounds: Normal breath sounds  No wheezing or rales  Abdominal:      General: Abdomen is flat  Bowel sounds are normal  There is no distension  Palpations: Abdomen is soft  Tenderness: There is no abdominal tenderness  There is no guarding  Musculoskeletal:      Cervical back: Normal range of motion and neck supple  Right lower leg: No edema  Left lower leg: No edema  Skin:     General: Skin is warm and dry  Neurological:      General: No focal deficit present  Mental Status: She is alert and oriented to person, place, and time  Psychiatric:         Mood and Affect: Mood normal          Behavior: Behavior normal          Discussion/Summary:    Chronic Diastolic CHF: now on HD for ESRD She is stable to have AV fistula placed from CV perspective  Syncope: No recurrence       CAD: Continue with current medical therapy   She has no angina continue with current medical management  She has been on long term DAPT will need to look into this further                The patient was counseled regarding diagnostic results, instructions for management, risk factor reductions, impressions  total time of encounter was 25 minutes and 15 minutes was spent counseling

## 2021-07-19 NOTE — TELEPHONE ENCOUNTER
Is patient requesting a call when authorization has been obtained? Patient did not request a call  Surgery Date: 7/26/21  Primary Surgeon: Tam Mendez (NPI: [id number])  Assisting Surgeon: Not Applicable (N/A)  Facility: [facility] (Tax: [id number] / NPI: [id number])  Inpatient / Outpatient: Outpatient  Level: 2    Clearance Received: Yes, Cardiology   Consent Received: Yes, scanned into Epic on 7/14/21  Medication Hold / Last Dose: Hold on Plavix  Last dose 7/19/21  VQI Spreadsheet: 7/19/21  IR Notified: Not Applicable (N/A)  Rep  Notified: Not Applicable (N/A)  Equipment Needs: Not Applicable (N/A)  Vas Lab Requested: Not Applicable (N/A)  Patient Contacted: 7/19/21    Diagnosis: N18 6, Z99 2  Procedure/ CPT Code(s): (AV) Arteriovenous Fistula // CPT: 47083    For varicose vein related procedures, last LEVDR? Not Applicable (N/A)    Post Operative Date/ Time: To Be Determined (TBD)     Pt will have blood work done this week  Ekg done 7/15/21  Pt took last dose of Plavix 7/19/21  Cleared by Dr Matty Erickson 7/19/21

## 2021-07-19 NOTE — PROGRESS NOTES
Cardiology Follow Up    Kuldip Ku  1955  [id number]  [address] [facility] [address] [phone]    1  Coronary artery disease involving native coronary artery of native heart without angina pectoris         Interval History: Followup CAD, preop clearance fistula  Patient started HD for ESRD earlier this year  Cardiac clearance requested for AV fistula placement  She has no exertional chest pain or dyspnea  Medical Problems     Problem List     Syncope and collapse    Stage 4 chronic kidney disease ([address] )    Overview Signed 1/7/2020  9:35 PM by Amina Coffman DO     Last Assessment & Plan:   Sees Dr Luis Favors          Lab Results   Component Value Date    EGFR 17 07/13/2021    EGFR 9 04/05/2021    EGFR 12 04/04/2021    CREATININE 2 82 (H) 07/13/2021    CREATININE 4 84 (H) 04/05/2021    CREATININE 3 65 (H) 04/04/2021              Class 3 severe obesity with body mass index (BMI) of 45 0 to 49 9 in adult [facility])    Overview Signed 1/7/2020  9:35 PM by Amina Coffman DO     Last Assessment & Plan:   bariatric medicine           Cerebral aneurysm without rupture    Acute on chronic combined systolic and diastolic congestive heart failure (HCC)     Wt Readings from Last 3 Encounters:   07/19/21 110 kg (242 lb)   07/14/21 110 kg (243 lb)   07/13/21 110 kg (241 lb 6 5 oz)                      Essential hypertension    Coronary artery disease involving native coronary artery of native heart without angina pectoris    Type 2 diabetes mellitus with stage 4 chronic kidney disease, with long-term current use of insulin (HCC)       Lab Results   Component Value Date    HGBA1C 7 6 (H) 03/15/2021              Lower extremity cellulitis    Venous insufficiency of both lower extremities    Anemia due to stage 4 chronic kidney disease (HCC)    Cellulitis of both lower extremities    Non compliance w medication regimen    Acute kidney injury superimposed on chronic kidney disease [facility])     Lab Results   Component Value Date    EGFR 17 07/13/2021    EGFR 9 04/05/2021    EGFR 12 04/04/2021    CREATININE 2 82 (H) 07/13/2021    CREATININE 4 84 (H) 04/05/2021    CREATININE 3 65 (H) 04/04/2021              Foot ulcer, left (HCC)    Hyperkalemia    End-stage renal disease on hemodialysis [facility])     Lab Results   Component Value Date    EGFR 17 07/13/2021    EGFR 9 04/05/2021    EGFR 12 04/04/2021    CREATININE 2 82 (H) 07/13/2021    CREATININE 4 84 (H) 04/05/2021    CREATININE 3 65 (H) 04/04/2021                        Past Medical History:   Diagnosis Date    Cardiac disease     CHF (congestive heart failure) ([facility] )     Coronary artery disease     Diabetes mellitus ([facility] )     Hyperlipidemia     Hypertension     Renal disorder     TIA (transient ischemic attack)      Social History     Socioeconomic History    Marital status:      Spouse name: Not on file    Number of children: Not on file    Years of education: Not on file    Highest education level: Not on file   Occupational History    Not on file   Tobacco Use    Smoking status: Former Smoker     Years: 40 00    Smokeless tobacco: Never Used    Tobacco comment: quit 1980   Vaping Use    Vaping Use: Never used   Substance and Sexual Activity    Alcohol use: Never     Comment: socially    Drug use: No    Sexual activity: Not on file   Other Topics Concern    Not on file   Social History Narrative    Not on file     Social Determinants of Health     Financial Resource Strain:     Difficulty of Paying Living Expenses:    Food Insecurity:     Worried About [address] in the Last Year:     [address] in the Last Year:    Transportation Needs:     Lack of Transportation (Medical):      Lack of Transportation (Non-Medical):    Physical Activity:     Days of Exercise per Week:     Minutes of Exercise per Session:    Stress:     Feeling of Stress :    Social Connections:     Frequency of Communication with Friends and Family:     Frequency of Social Gatherings with Friends and Family:     Attends [employer]ism Services:     Active Member of Clubs or Organizations:     Attends Club or Organization Meetings:     Marital Status:    Intimate Partner Violence:     Fear of Current or Ex-Partner:     Emotionally Abused:     Physically Abused:     Sexually Abused:       No family history on file    Past Surgical History:   Procedure Laterality Date    CARDIAC SURGERY       SECTION      CORONARY ANGIOPLASTY WITH STENT PLACEMENT      CT NEEDLE BIOPSY KIDNEY  3/25/2021    IR TUNNELED DIALYSIS CATHETER PLACEMENT  3/30/2021       Current Outpatient Medications:     acetaminophen (TYLENOL) 325 mg tablet, Take 2 tablets (650 mg total) by mouth every 6 (six) hours as needed for mild pain, headaches or fever, Disp:  , Rfl: 0    aspirin (ECOTRIN LOW STRENGTH) 81 mg EC tablet, Take 81 mg by mouth daily, Disp: , Rfl:     atorvastatin (LIPITOR) 40 mg tablet, Take 40 mg by mouth daily, Disp: , Rfl:     calcium acetate (PHOSLO) capsule, Take 1 capsule (667 mg total) by mouth 3 (three) times a day with meals, Disp: 90 capsule, Rfl: 1    carvedilol (COREG) 12 5 mg tablet, Take 1 tablet (12 5 mg total) by mouth 2 (two) times a day, Disp: 60 tablet, Rfl: 1    clopidogrel (PLAVIX) 75 mg tablet, Take 75 mg by mouth daily, Disp: , Rfl:     insulin glargine (Toujeo SoloStar) 300 units/mL CONCENTRATED U-300 injection pen (1-unit dial), Inject 25 Units under the skin daily before breakfast, Disp: 4 5 mL, Rfl: 0    insulin lispro (HumaLOG) 100 units/mL injection, Inject 3 Units under the skin 3 (three) times a day with meals, Disp: 3 mL, Rfl: 1    lisinopril (ZESTRIL) 20 mg tablet, Take 20 mg by mouth daily , Disp: , Rfl:     nitroglycerin (NITROSTAT) 0 4 mg SL tablet, Place 0 4 mg under the tongue , Disp: , Rfl:     B Complex-C-Folic Acid (Gómez) TABS, Take 1 tablet by mouth daily (Patient not taking: Reported on 7/14/2021), Disp: , Rfl:     midodrine (PROAMATINE) 10 MG tablet, Take 1 tablet (10 mg total) by mouth Before Dialysis (Please give 30-45 minutes prior to dialysis on April 3rd please) (Patient not taking: Reported on 7/19/2021), Disp: 30 tablet, Rfl: 1  Allergies   Allergen Reactions    Sodium Hypochlorite Other (See Comments)     Nausea and vomiting    Benzonatate Other (See Comments)     felt drunk    Morphine     Penicillins     Other Rash     Chlorine bleach       Labs:     Chemistry        Component Value Date/Time     03/10/2015 1503    K 4 2 07/13/2021 1004    K 4 3 03/10/2015 1503     07/13/2021 1004     03/10/2015 1503    CO2 26 07/13/2021 1004    CO2 27 03/10/2015 1503    BUN 29 (H) 07/13/2021 1004    BUN 22 03/10/2015 1503    CREATININE 2 82 (H) 07/13/2021 1004    CREATININE 0 65 03/10/2015 1503        Component Value Date/Time    CALCIUM 8 6 07/13/2021 1004    CALCIUM 8 8 03/10/2015 1503    ALKPHOS 138 (H) 07/13/2021 1004    ALKPHOS 120 (H) 03/10/2015 1503    AST 21 07/13/2021 1004    AST 13 03/10/2015 1503    ALT 20 07/13/2021 1004    ALT 28 03/10/2015 1503    BILITOT 0 50 03/10/2015 1503            No results found for: CHOL  No results found for: HDL  No results found for: LDLCALC  No results found for: TRIG  No results found for: CHOLHDL    Imaging: XR chest 2 views    Result Date: 7/13/2021  Narrative: CHEST INDICATION:   cough x 1 week  COMPARISON:  04/02/2021 EXAM PERFORMED/VIEWS:  XR CHEST PA & LATERAL  The frontal view was performed utilizing dual energy radiographic technique  Images: 4 FINDINGS: Cardiomediastinal silhouette appears unremarkable  Dialysis catheter are again noted on the right  Coronary artery stents are present  The lungs are clear  No pneumothorax or pleural effusion  Osseous structures appear within normal limits for patient age       Impression: No acute cardiopulmonary disease  Workstation performed: [id number]     VAS vessel mapping for hemodialysis upper    Result Date: 7/2/2021  Narrative:  [facility] REPORT CLINICAL: Indications:  Pre-Op evaluation for future dialysis AVF  Patient has chronic kidney disease  Patient is Right handed  FINDINGS:  Right                   Diameter AP (mm)  PSV (cm/s)  Subclavian                                        73  Brachial Artery                      4 8          89  Dist  Radial Artery                  1 5          30  Prox  Radial                                      68  Dist  Ulnar Artery                               101  Mid Radial                                        41  Prox  Ulnar                                       94  Mid  Ulnar                                       110  Median Cubital V                     4 5              Bas Mid Arm                          2 9              Basilic Lower Arm                    4 0              Bas AC                               2 2              Basilic Upper Forearm                1 5              Bas Mid Forearm                      1 5              Ceph Upper                           4 9              Ceph Mid Arm                         3 6              Cephalic Lower Arm                   4 1              Ceph AC                              3 6              Cephalic Upper Forearm               3 1              Ceph Mid Forearm                     2 6              Cephalic Lower Forearm               2 9              Ceph Wrist                           2 0               Left                    Diameter AP (mm)  PSV (cm/s)  Subclavian                                        70  Brachial Artery                      4 1          86  Dist  Radial Artery                  1 9          65  Axillary                                          86  Prox   Radial                                      82  Dist  Ulnar Artery                   1 8          74  Mid Radial 65  Prox  Ulnar                                      106  Mid  Ulnar                                        76  Bas Mid Arm                          3 3              Basilic Lower Arm                    2 5              Bas AC                               2 2              Basilic Upper Forearm                2 1              Bas Mid Forearm                      2 4              Basilic Lower Forearm                2 2              Ceph Upper                           3 6              Ceph Mid Arm                         2 4              Cephalic Lower Arm                   3 1              Ceph AC                              4 3              Cephalic Upper Forearm               2 3              Ceph Mid Forearm                     2 1              Cephalic Lower Forearm               2 9              Ceph Wrist                           2 3                 CONCLUSION: Impression RIGHT ARM: The cephalic and basilic veins communicate with the median cubital vein  The cephalic vein is patent and remains >2mm in diameter throughout the arm The basilic vein is patent and remains >2mm in diameter from the mid upper arm to the fossa  The brachial artery measures 4 8mm in its greatest diameter and bifurcates at the mid upper arm  The subclavian, axillary and brachial arteries are widely patent  The IJV, subclavian, axillary and brachial veins are patent  LEFT ARM: The cephalic and basilic veins does not communicate with the median cubital vein  The cephalic vein is patent and remains >2mm in diameter throughout the arm The basilic vein is patent and remains >2mm in diameter throughout the arm The brachial artery measures 4 1mm in its greatest diameter and bifurcates at the upper arm  The ulnar artery then also bifurcates at the proximal forearm  The subclavian, axillary and brachial arteries are widely patent  The IJV, subclavian, axillary and brachial veins are patent    SIGNATURE: Electronically Signed by: Harrison Palma MD on 2021-07-02 03:57:09 PM          Review of Systems   Constitutional: Positive for malaise/fatigue  HENT: Negative  Eyes: Negative  Cardiovascular: Negative  Respiratory: Negative  Endocrine: Negative  Hematologic/Lymphatic: Negative  Skin: Negative  Musculoskeletal: Negative  Gastrointestinal: Negative  Genitourinary: Negative  Neurological: Negative  Psychiatric/Behavioral: Negative  Allergic/Immunologic: Negative  Vitals:    07/19/21 1108   BP: 166/70   Pulse: 67           Physical Exam  Vitals and nursing note reviewed  Constitutional:       Appearance: Normal appearance  HENT:      Head: Normocephalic  Nose: Nose normal       Mouth/Throat:      Mouth: Mucous membranes are moist    Eyes:      General: No scleral icterus  Conjunctiva/sclera: Conjunctivae normal    Cardiovascular:      Rate and Rhythm: Normal rate and regular rhythm  Heart sounds: No murmur heard  No gallop  Pulmonary:      Effort: Pulmonary effort is normal  No respiratory distress  Breath sounds: Normal breath sounds  No wheezing or rales  Abdominal:      General: Abdomen is flat  Bowel sounds are normal  There is no distension  Palpations: Abdomen is soft  Tenderness: There is no abdominal tenderness  There is no guarding  Musculoskeletal:      Cervical back: Normal range of motion and neck supple  Right lower leg: No edema  Left lower leg: No edema  Skin:     General: Skin is warm and dry  Neurological:      General: No focal deficit present  Mental Status: She is alert and oriented to person, place, and time  Psychiatric:         Mood and Affect: Mood normal          Behavior: Behavior normal          Discussion/Summary:    Chronic Diastolic CHF: now on HD for ESRD She is stable to have AV fistula placed from CV perspective  Syncope: No recurrence       CAD: Continue with current medical therapy   She has no angina continue with current medical management  She has been on long term DAPT will need to look into this further                The patient was counseled regarding diagnostic results, instructions for management, risk factor reductions, impressions  total time of encounter was 25 minutes and 15 minutes was spent counseling

## 2021-07-22 NOTE — TELEPHONE ENCOUNTER
Authorization requirements reviewed  Please refer to Yanira Houston / Jean Sell number [id number] for case updates

## 2021-07-23 ENCOUNTER — LAB (OUTPATIENT)
Dept: LAB | Facility: HOSPITAL | Age: 66
End: 2021-07-23
Attending: SURGERY
Payer: COMMERCIAL

## 2021-07-23 DIAGNOSIS — Z00.00 ROUTINE GENERAL MEDICAL EXAMINATION AT A HEALTH CARE FACILITY: ICD-10-CM

## 2021-07-23 LAB
ANION GAP SERPL CALCULATED.3IONS-SCNC: 9 MMOL/L (ref 4–13)
BUN SERPL-MCNC: 49 MG/DL (ref 5–25)
CALCIUM SERPL-MCNC: 8.6 MG/DL (ref 8.3–10.1)
CHLORIDE SERPL-SCNC: 104 MMOL/L (ref 100–108)
CO2 SERPL-SCNC: 25 MMOL/L (ref 21–32)
CREAT SERPL-MCNC: 4.06 MG/DL (ref 0.6–1.3)
ERYTHROCYTE [DISTWIDTH] IN BLOOD BY AUTOMATED COUNT: 14.4 % (ref 11.6–15.1)
GFR SERPL CREATININE-BSD FRML MDRD: 11 ML/MIN/1.73SQ M
GLUCOSE P FAST SERPL-MCNC: 185 MG/DL (ref 65–99)
HCT VFR BLD AUTO: 41.5 % (ref 34.8–46.1)
HGB BLD-MCNC: 13 G/DL (ref 11.5–15.4)
MCH RBC QN AUTO: 30.7 PG (ref 26.8–34.3)
MCHC RBC AUTO-ENTMCNC: 31.3 G/DL (ref 31.4–37.4)
MCV RBC AUTO: 98 FL (ref 82–98)
PLATELET # BLD AUTO: 174 THOUSANDS/UL (ref 149–390)
PMV BLD AUTO: 10.7 FL (ref 8.9–12.7)
POTASSIUM SERPL-SCNC: 4.6 MMOL/L (ref 3.5–5.3)
RBC # BLD AUTO: 4.23 MILLION/UL (ref 3.81–5.12)
SODIUM SERPL-SCNC: 138 MMOL/L (ref 136–145)
WBC # BLD AUTO: 7.73 THOUSAND/UL (ref 4.31–10.16)

## 2021-07-23 PROCEDURE — 36415 COLL VENOUS BLD VENIPUNCTURE: CPT

## 2021-07-23 PROCEDURE — 80048 BASIC METABOLIC PNL TOTAL CA: CPT

## 2021-07-23 PROCEDURE — 85027 COMPLETE CBC AUTOMATED: CPT

## 2021-07-26 ENCOUNTER — NURSE TRIAGE (OUTPATIENT)
Dept: OTHER | Facility: OTHER | Age: 66
End: 2021-07-26

## 2021-07-26 ENCOUNTER — HOSPITAL ENCOUNTER (OUTPATIENT)
Facility: HOSPITAL | Age: 66
Setting detail: OUTPATIENT SURGERY
Discharge: HOME | End: 2021-07-26
Attending: SURGERY | Admitting: SURGERY
Payer: COMMERCIAL

## 2021-07-26 ENCOUNTER — ANESTHESIA EVENT (OUTPATIENT)
Dept: PERIOP | Facility: HOSPITAL | Age: 66
End: 2021-07-26
Payer: COMMERCIAL

## 2021-07-26 ENCOUNTER — ANESTHESIA (OUTPATIENT)
Dept: PERIOP | Facility: HOSPITAL | Age: 66
End: 2021-07-26
Payer: COMMERCIAL

## 2021-07-26 VITALS
SYSTOLIC BLOOD PRESSURE: 162 MMHG | HEART RATE: 64 BPM | TEMPERATURE: 97.7 F | DIASTOLIC BLOOD PRESSURE: 72 MMHG | OXYGEN SATURATION: 94 % | RESPIRATION RATE: 19 BRPM

## 2021-07-26 DIAGNOSIS — Z99.2 END-STAGE RENAL DISEASE ON HEMODIALYSIS (HCC): Primary | ICD-10-CM

## 2021-07-26 DIAGNOSIS — N18.6 END-STAGE RENAL DISEASE ON HEMODIALYSIS (HCC): Primary | ICD-10-CM

## 2021-07-26 LAB
GLUCOSE SERPL-MCNC: 161 MG/DL (ref 65–140)
GLUCOSE SERPL-MCNC: 163 MG/DL (ref 65–140)
POTASSIUM SERPL-SCNC: 5.4 MMOL/L (ref 3.5–5.3)

## 2021-07-26 PROCEDURE — 36821 AV FUSION DIRECT ANY SITE: CPT | Performed by: PHYSICIAN ASSISTANT

## 2021-07-26 PROCEDURE — 82948 REAGENT STRIP/BLOOD GLUCOSE: CPT

## 2021-07-26 PROCEDURE — 84132 ASSAY OF SERUM POTASSIUM: CPT | Performed by: SURGERY

## 2021-07-26 PROCEDURE — 36821 AV FUSION DIRECT ANY SITE: CPT | Performed by: SURGERY

## 2021-07-26 RX ORDER — ONDANSETRON 2 MG/ML
INJECTION INTRAMUSCULAR; INTRAVENOUS AS NEEDED
Status: DISCONTINUED | OUTPATIENT
Start: 2021-07-26 | End: 2021-07-26

## 2021-07-26 RX ORDER — HYDRALAZINE HYDROCHLORIDE 20 MG/ML
10 INJECTION INTRAMUSCULAR; INTRAVENOUS EVERY 6 HOURS PRN
Status: DISCONTINUED | OUTPATIENT
Start: 2021-07-26 | End: 2021-07-26 | Stop reason: HOSPADM

## 2021-07-26 RX ORDER — OXYCODONE HYDROCHLORIDE 5 MG/1
5 TABLET ORAL EVERY 6 HOURS PRN
Qty: 10 TABLET | Refills: 0 | Status: SHIPPED | OUTPATIENT
Start: 2021-07-26 | End: 2021-09-21

## 2021-07-26 RX ORDER — CLINDAMYCIN PHOSPHATE 900 MG/50ML
INJECTION INTRAVENOUS AS NEEDED
Status: DISCONTINUED | OUTPATIENT
Start: 2021-07-26 | End: 2021-07-26

## 2021-07-26 RX ORDER — CLINDAMYCIN PHOSPHATE 900 MG/50ML
900 INJECTION INTRAVENOUS ONCE
Status: COMPLETED | OUTPATIENT
Start: 2021-07-26 | End: 2021-07-26

## 2021-07-26 RX ORDER — CHLORHEXIDINE GLUCONATE 0.12 MG/ML
15 RINSE ORAL ONCE
Status: COMPLETED | OUTPATIENT
Start: 2021-07-26 | End: 2021-07-26

## 2021-07-26 RX ORDER — PROPOFOL 10 MG/ML
INJECTION, EMULSION INTRAVENOUS CONTINUOUS PRN
Status: DISCONTINUED | OUTPATIENT
Start: 2021-07-26 | End: 2021-07-26

## 2021-07-26 RX ORDER — OXYCODONE HYDROCHLORIDE 5 MG/1
5 TABLET ORAL EVERY 4 HOURS PRN
Status: COMPLETED | OUTPATIENT
Start: 2021-07-26 | End: 2021-07-26

## 2021-07-26 RX ORDER — EPHEDRINE SULFATE 50 MG/ML
INJECTION INTRAVENOUS AS NEEDED
Status: DISCONTINUED | OUTPATIENT
Start: 2021-07-26 | End: 2021-07-26

## 2021-07-26 RX ORDER — SODIUM CHLORIDE, SODIUM LACTATE, POTASSIUM CHLORIDE, CALCIUM CHLORIDE 600; 310; 30; 20 MG/100ML; MG/100ML; MG/100ML; MG/100ML
INJECTION, SOLUTION INTRAVENOUS CONTINUOUS PRN
Status: DISCONTINUED | OUTPATIENT
Start: 2021-07-26 | End: 2021-07-26

## 2021-07-26 RX ORDER — FENTANYL CITRATE/PF 50 MCG/ML
25 SYRINGE (ML) INJECTION
Status: DISCONTINUED | OUTPATIENT
Start: 2021-07-26 | End: 2021-07-26 | Stop reason: HOSPADM

## 2021-07-26 RX ORDER — BUPIVACAINE HYDROCHLORIDE AND EPINEPHRINE 5; 5 MG/ML; UG/ML
INJECTION, SOLUTION EPIDURAL; INTRACAUDAL; PERINEURAL AS NEEDED
Status: DISCONTINUED | OUTPATIENT
Start: 2021-07-26 | End: 2021-07-26 | Stop reason: HOSPADM

## 2021-07-26 RX ORDER — PROPOFOL 10 MG/ML
INJECTION, EMULSION INTRAVENOUS AS NEEDED
Status: DISCONTINUED | OUTPATIENT
Start: 2021-07-26 | End: 2021-07-26

## 2021-07-26 RX ORDER — ONDANSETRON 2 MG/ML
4 INJECTION INTRAMUSCULAR; INTRAVENOUS ONCE
Status: DISCONTINUED | OUTPATIENT
Start: 2021-07-26 | End: 2021-07-26 | Stop reason: HOSPADM

## 2021-07-26 RX ORDER — MIDAZOLAM HYDROCHLORIDE 2 MG/2ML
INJECTION, SOLUTION INTRAMUSCULAR; INTRAVENOUS AS NEEDED
Status: DISCONTINUED | OUTPATIENT
Start: 2021-07-26 | End: 2021-07-26

## 2021-07-26 RX ORDER — LIDOCAINE HYDROCHLORIDE 10 MG/ML
INJECTION, SOLUTION EPIDURAL; INFILTRATION; INTRACAUDAL; PERINEURAL AS NEEDED
Status: DISCONTINUED | OUTPATIENT
Start: 2021-07-26 | End: 2021-07-26 | Stop reason: HOSPADM

## 2021-07-26 RX ORDER — HEPARIN SODIUM 1000 [USP'U]/ML
INJECTION, SOLUTION INTRAVENOUS; SUBCUTANEOUS AS NEEDED
Status: DISCONTINUED | OUTPATIENT
Start: 2021-07-26 | End: 2021-07-26

## 2021-07-26 RX ADMIN — MIDAZOLAM 2 MG: 1 INJECTION INTRAMUSCULAR; INTRAVENOUS at 09:30

## 2021-07-26 RX ADMIN — HEPARIN SODIUM 2000 UNITS: 1000 INJECTION INTRAVENOUS; SUBCUTANEOUS at 10:53

## 2021-07-26 RX ADMIN — CHLORHEXIDINE GLUCONATE 0.12% ORAL RINSE 15 ML: 1.2 LIQUID ORAL at 08:27

## 2021-07-26 RX ADMIN — OXYCODONE HYDROCHLORIDE 5 MG: 5 TABLET ORAL at 13:41

## 2021-07-26 RX ADMIN — CLINDAMYCIN IN 5 PERCENT DEXTROSE 900 MG: 18 INJECTION, SOLUTION INTRAVENOUS at 09:45

## 2021-07-26 RX ADMIN — EPHEDRINE SULFATE 5 MG: 50 INJECTION, SOLUTION INTRAVENOUS at 10:15

## 2021-07-26 RX ADMIN — PROPOFOL 120 MG: 10 INJECTION, EMULSION INTRAVENOUS at 09:57

## 2021-07-26 RX ADMIN — EPHEDRINE SULFATE 10 MG: 50 INJECTION, SOLUTION INTRAVENOUS at 10:49

## 2021-07-26 RX ADMIN — EPHEDRINE SULFATE 10 MG: 50 INJECTION, SOLUTION INTRAVENOUS at 10:38

## 2021-07-26 RX ADMIN — ONDANSETRON 4 MG: 2 INJECTION INTRAMUSCULAR; INTRAVENOUS at 11:34

## 2021-07-26 RX ADMIN — PROPOFOL 40 MG: 10 INJECTION, EMULSION INTRAVENOUS at 10:18

## 2021-07-26 RX ADMIN — SODIUM CHLORIDE, SODIUM LACTATE, POTASSIUM CHLORIDE, AND CALCIUM CHLORIDE: .6; .31; .03; .02 INJECTION, SOLUTION INTRAVENOUS at 08:25

## 2021-07-26 RX ADMIN — PROPOFOL 60 MCG/KG/MIN: 10 INJECTION, EMULSION INTRAVENOUS at 09:41

## 2021-07-26 RX ADMIN — EPHEDRINE SULFATE 10 MG: 50 INJECTION, SOLUTION INTRAVENOUS at 10:19

## 2021-07-26 RX ADMIN — EPHEDRINE SULFATE 10 MG: 50 INJECTION, SOLUTION INTRAVENOUS at 10:42

## 2021-07-26 NOTE — ANESTHESIA POSTPROCEDURE EVALUATION
Post-Op Assessment Note    CV Status:  Stable  Pain Score: 0    Pain management: adequate     Mental Status:  Alert, awake and anxious   Hydration Status:  Euvolemic   PONV Controlled:  Controlled   Airway Patency:  Patent  Airway: intubated (LMA)      Post Op Vitals Reviewed: Yes      Staff: Anesthesiologist, with CRNAs   Comments: to PACU on 3L NC        No complications documented      BP (!) 197/77 (07/26/21 1204)    Temp (!) 96 5 °F (35 8 °C) (07/26/21 1204)    Pulse 72 (07/26/21 1204)   Resp 17 (07/26/21 1204)    SpO2 94 % (07/26/21 1204)

## 2021-07-26 NOTE — OP NOTE
OPERATIVE REPORT  PATIENT NAME: Mendoza Parekh    :  1955  MRN: [id number]  Pt Location:  OR ROOM 01    SURGERY DATE: 2021    Surgeon(s) and Role:     * Vanessa Whitlock MD - Primary     * Mansi Stokes PA-C - Assisting    Preop Diagnosis:  End-stage renal disease on hemodialysis ([facility] ) [N18 6, Z99 2]    Post-Op Diagnosis Codes:     * End-[facility] renal [facility] ([facility] ) [N18 6, Z99 2]    Procedure(s) (LRB):  CREATION FISTULA ARTERIOVENOUS (AV) left upper extremity (Left)    Specimen(s):  * No specimens in log *    Estimated Blood Loss:   Minimal    Drains:  * No LDAs found *    Anesthesia Type:   IV Sedation with Anesthesia    Operative Indications:  End-stage renal disease on hemodialysis ([facility] ) [N18 6, Z99 2]      Operative Findings:  See op report    Complications:   None    Procedure and Technique:  The patient Kit Hollins was identified in the operating room after adequate IV sedation/laryngeal mask anesthesia was obtained the left arm was prepped and draped using chlorpropamide prep and sterile drape Infiltration with 1% Xylocaine incision was made over the cephalic vein sharp dissection down through the skin and subcutaneous tissue the vein was mobilized for several centimeters  A non-crushing vascular clamp was placed proximally and the vein dilated nicely with a heparin Papaverin solution  Deepening our incision the deep fascia was incised the distal brachial artery was identified and vessel loops were placed proximally and distally  An arteriotomy was made the artery was locally heparinized, the vein was cut to size and anastomosed  to the artery and an end to side fashion using a running 6-0 prolene  Prior to completion the vein was irrigated with heparin/papaverine solution, the artery was dilated with a 2 5 mm coronary dilator and the anastomosis was completed an excellent thrill to be felt immediately into the outflow tract  Handheld Doppler confirmed excellent flow    Physician assistant was required due to depth of the surgical field, helping with retraction and following during creation of the AV fistula anastomosis  The wound was irrigated with copious amounts of antibiotic solution, the wound was closed in 2 layers using running 3-0 Monocryl at the subcutaneous level and 4-0 Monocryl in a subcuticular fashion  Sterile dressing was place and he was taken to the recovery room in stable condition        Vascular Quality Initiative - Hemodialysis Access Placement    Previous Access: forearm fistula (none )    Preop ARTERIAL evaluation and/or treatment: duplex    Preop VENOUS evaluation and/or treatment: ultrasound mapping    Status: Outpatient    Anesthesia: General    Side:left    Access Type: AVF     I was present for the entire procedure, A qualified resident physician was not available and A physician assistant was required during the procedure for retraction tissue handling,dissection and suturing    Patient Disposition:  PACU     SIGNATURE: Nj Gresham MD  DATE: July 26, 2021  TIME: 11:42 AM

## 2021-07-26 NOTE — TELEPHONE ENCOUNTER
I TT Dr Kristine Jeffries regarding this patient's pain #8-9-postop from Fistula AV inserted today  No relief from one Oxycodone and 2- 500 mgs  Tylenol  Dr Alfred Robbins will call patient directly now

## 2021-07-26 NOTE — INTERVAL H&P NOTE
H&P reviewed  After examining the patient I find no changes in the patients condition since the H&P had been written      Plan:  Left upper arm AV fistula possible dialysis graft pending intraoperative ultrasound    Vitals:    07/26/21 0818   BP: (!) 187/77   Pulse: 59   Resp: 18   Temp: 98 1 °F (36 7 °C)   SpO2: 96%

## 2021-07-26 NOTE — DISCHARGE INSTRUCTIONS
DISCHARGE INSTRUCTIONS  ARM SURGERY    Following discharge from the hospital, you may have some questions about your operation, your activities or your general condition  These instructions may answer some of your questions and help you adjust during the first few weeks following your operation  ACTIVITY:    Limit use of the operated arm to what is absolutely necessary for the first day after surgery  On the second day after surgery, you may start to increase use of your arm as tolerated  One week after surgery, you should start to exercise your hand on the side of the dialysis graft by squeezing a ball  This increases blood flow in your graft and arm so your graft will function better  DIET:   Resume your normal diet  Try to eat low fat and low cholesterol foods  INCISION:   Your surgeon may have chosen to use a type of adhesive glue to close your incision  There are stitches present under the skin, which will absorb on their own  The glue is used to cover the incision, assist in closure, and prevent contamination  This adhesive will darken and peel away on its own within one to two weeks  You may shower after your surgery if there is adhesive glue present, but do not scrub the incision  If you do not have this adhesive glue, you may include the operated area in a shower on the third day following surgery  It is normal to have some pain at the surgical site  You will receive a prescription for pain medicine at the time of discharge  It is normal to have some bruising, swelling or mild discoloration around the incision  If increasing redness or pain develops at the incision site or severe pain, numbness, or weakness occurs in the hand, call our office immediately  Numbness in the region of the incision may occur following the surgery  This normally resolves in six to twelve months  ARM SWELLING: Most patients have some noticeable arm swelling after surgery    This usually disappears within a few weeks  If swelling is present, elevate the arm whenever possible  RESTRICTIONS: Do not have blood draws, IVs, or blood pressures performed on the operated arm  PLEASE CALL THE OFFICE IF YOU HAVE ANY QUESTIONS    [phone] Pasquale Other [phone] [facility] FREE [phone] [address] [facility] , [address], [place], [address]  [address], [address], [place], [address] [facility]  [address], [place], [address]  [address] [facility], [facility],[address], [facility], [address] [facility] [address]    [address], [address], [address]  [facility], [address] [facility], [address]  [address] [facility], [address] [facility], [address]  One [facility], 532 [facility], 30 [facility] 6  201 [facility], [place], [address]

## 2021-07-26 NOTE — ANESTHESIA PREPROCEDURE EVALUATION
Procedure:  CREATION FISTULA ARTERIOVENOUS (AV) left upper extremity possible AV graft (Left Arm Upper)    Relevant Problems   CARDIO   (+) Acute on chronic combined systolic and diastolic congestive heart failure (HCC)   (+) Coronary artery disease involving native coronary artery of native heart without angina pectoris   (+) Essential hypertension      ENDO   (+) Type 2 diabetes mellitus with stage 4 chronic kidney disease, with long-term current use of insulin (HCC)      /RENAL   (+) Acute kidney injury superimposed on chronic kidney disease (HCC)   (+) End-stage renal disease on hemodialysis (HCC)   (+) Stage 4 chronic kidney disease (HCC)      HEMATOLOGY   (+) Anemia due to stage 4 chronic kidney disease (HCC)        Physical Exam    Airway    Mallampati score: II  TM Distance: >3 FB  Neck ROM: full     Dental   No notable dental hx     Cardiovascular  Cardiovascular exam normal    Pulmonary  Pulmonary exam normal     Other Findings        Anesthesia Plan  ASA Score- 3     Anesthesia Type- IV sedation with anesthesia with ASA Monitors  Additional Monitors:   Airway Plan:           Plan Factors-    Chart reviewed  EKG reviewed  Patient summary reviewed  Patient is not a current smoker  Induction- intravenous  Postoperative Plan- Plan for postoperative opioid use  Informed Consent- Anesthetic plan and risks discussed with patient  I personally reviewed this patient with the CRNA  Discussed and agreed on the Anesthesia Plan with the CRNA  Rodri Chang

## 2021-07-26 NOTE — TELEPHONE ENCOUNTER
Reason for Disposition   [1] SEVERE post-op pain (e g , excruciating, pain scale 8-10) AND [2] not controlled with pain medications    Answer Assessment - Initial Assessment Questions  1  SYMPTOM: "What's the main symptom you're concerned about?" (e g , pain, fever, vomiting)      pain  2  ONSET: "When did pain  start?"      In recovery room pain started  3  SURGERY: "What surgery was performed?"      Fistula AV Left Upper Arm for hemodialysis  4  DATE of SURGERY: "When was surgery performed?"       Today just left the hospital at 1500  5  ANESTHESIA: " What type of anesthesia did you have?" (e g , general, spinal, epidural, local)      General  6  PAIN: "Is there any pain?" If Yes, ask: "How bad is it?"  (Scale 1-10; or mild, moderate, severe)      # 8-9  One Oxycodone given in Recovery Room and took 2 -500 mgs  Tylenol at 1515 with no relief  7  FEVER: "Do you have a fever?" If Yes, ask: "What is your temperature, how was it measured, and when did it start?"      None  8  VOMITING: "Is there any vomiting?" If yes, ask: "How many times?"      None  9  BLEEDING: "Is there any bleeding?" If Yes, ask: "How much?" and "Where?"      Small amount of dime size bright red blood on her dressing    10  OTHER SYMPTOMS: "Do you have any other symptoms?" (e g , drainage from wound, painful urination, constipation)        None    Protocols used: POST-OP SYMPTOMS AND QUESTIONS-[facility]

## 2021-07-27 NOTE — TELEPHONE ENCOUNTER
Pt left vm on nursing line stating she is returning call, she did s/w on call physician last evening and all of her concerns were addressed

## 2021-07-28 ENCOUNTER — TELEPHONE (OUTPATIENT)
Dept: VASCULAR SURGERY | Facility: CLINIC | Age: 66
End: 2021-07-28

## 2021-07-28 NOTE — TELEPHONE ENCOUNTER
Vascular Nurse Navigator Post Op Call    Procedure: CREATION FISTULA ARTERIOVENOUS (AV) left upper extremity (Left)    Date of Procedure: 7/26/21    Surgeon:    Leonila Hutchinson MD - Primary     * Mansi Stokes PA-C - Assisting      Painful tingling or numbness in your fingers?: No    Paleness/Coolness in hands/fingers?: No    Redness, swelling or pus from your wound?: No    Bleeding?: No    Thrill present?: Yes    Anticoagulation pt was discharged on post op?: Aspirin and Clopidogrel (Plavix)    Statin pt was discharged on post op?:  Lipitor (atorvastatin)    Fever/chills?: No    Uncontrolled Pain?: No      Reviewed discharge instructions and incision care with patient  Dialysis Days and Location:  [facility] at [address] [place] [address] OFFICE VISIT:  8/30/21 at 3:30 pm with Logan Conn at [facility] Confirmed?: Yes      Any Questions or Concerns? Patient stated that she is doing better since procedure  She stated that her pain has improved since yesterday  Reviewed incision care with patient - remove dressing 3 days post op and then wash incision daily with soap and water  Reviewed activity restrictions - no lifting more than 10 lbs  Reviewed hand exercises with patient  All questions answered  No concerns expressed at this time

## 2021-07-28 NOTE — TELEPHONE ENCOUNTER
Pt s/p SALOME AVF 7/26 by Dr Leonila Manning  Received call from Maria R at [facility]  She states she was making post-op calls and noticed that pt's discharge summary states that pt should leave dressing in place until post-op visit in the office but pt is not scheduled to return to the office until 8/30  Informed her I would send a message to our nurse navigator to address w/ the pt when she makes her post-op call

## 2021-08-17 NOTE — PROGRESS NOTES
Assessment/Plan:   Presents almost 1 month status post creation of left upper arm AV fistula  She has an excellent bruit and thrill an easily palpable radial pulse with no steal symptoms  Her incision is healing clean and dry  Plan:  Follow-up hemodialysis access evaluation for maturity in early to mid September and then back in the office for further evaluation and recommendations for accessing at that time  Diagnoses and all orders for this visit:    End-stage renal disease on hemodialysis ([facility] [address] )  -     VAS hemodialysis access duplex left upper limb avf; Future        Subjective:      Patient ID: Toro Casey is a 73 y o  female  Patient presents today s/p creation of LUE AVF by Dr Ginette Mitchell on 7/26/21  Patient denies fever, chills, tingling or coldness in the UE  She reports numbness in the left forearm and a discomfort with minor swelling  There is a positive thrill/bruit in the LUE  Patient goes to HD TTS at the [place] [address] in [facility] and is being dialyzed via Rt chest permacath  Patient is taking ASA 81, Atorvastatin, and Plavix  HPI    The following portions of the patient's history were reviewed and updated as appropriate: allergies, current medications, past family history, past medical history, past social history, past surgical history and problem list     Review of Systems   Constitutional: Negative  HENT: Negative  Eyes: Positive for visual disturbance (Cataracts)  Respiratory: Negative  Cardiovascular: Positive for leg swelling  Gastrointestinal: Negative  Endocrine: Negative  Genitourinary: Negative  Musculoskeletal: Negative  Skin: Negative  Allergic/Immunologic: Negative  Neurological: Positive for numbness (left forearm)  Hematological: Bruises/bleeds easily  Psychiatric/Behavioral: Negative  Objective: There were no vitals taken for this visit           Physical Exam    Left upper arm AV fistula excellent bruit and thrill, incisions healing clean and dry easily palpable radial pulse no steal symptoms  I have reviewed and made appropriate changes to the review of systems input by the medical assistant  Vitals:    21 0940   BP: 132/58   BP Location: Right arm   Patient Position: Sitting   Cuff Size: Adult   Pulse: 76   Temp: 99 6 °F (37 6 °C)   TempSrc: Tympanic   Weight: 109 kg (240 lb)   Height: 5' 4" (1 626 m)       Patient Active Problem List   Diagnosis    Syncope and collapse    Stage 4 chronic kidney disease ([facility])    Class 3 severe obesity with body mass index (BMI) of 45 0 to 49 9 in adult [facility])    Cerebral aneurysm without rupture    Acute on chronic combined systolic and diastolic congestive heart failure ([facility])    Essential hypertension    Coronary artery disease involving native coronary artery of native heart without angina pectoris    Type 2 diabetes mellitus with stage 4 chronic kidney disease, with long-term current use of insulin ([facility])    Lower extremity cellulitis    Venous insufficiency of both lower extremities    Anemia due to stage 4 chronic kidney disease ([facility])    Cellulitis of both lower extremities    Non compliance w medication regimen    Acute kidney injury superimposed on chronic kidney disease ([facility])    Foot ulcer, left ([facility])    Hyperkalemia    End-stage renal disease on hemodialysis ([facility])       Past Surgical History:   Procedure Laterality Date    CARDIAC SURGERY       SECTION      CORONARY ANGIOPLASTY WITH STENT PLACEMENT      CT NEEDLE BIOPSY KIDNEY  3/25/2021    IR TUNNELED DIALYSIS CATHETER PLACEMENT  3/30/2021    NM ANASTOMOSIS,AV,ANY SITE Left 2021    Procedure: CREATION FISTULA ARTERIOVENOUS (AV) left upper extremity;  Surgeon: Doni Ramírez MD;  Location:  MAIN OR;  Service: Vascular       No family history on file  Social History     Socioeconomic History    Marital status:       Spouse name: Not on file    Number of children: Not on file    Years of education: Not on file    Highest education level: Not on file   Occupational History    Not on file   Tobacco Use    Smoking status: Former Smoker     Years: 40 00    Smokeless tobacco: Never Used    Tobacco comment: quit 1980   Vaping Use    Vaping Use: Never used   Substance and Sexual Activity    Alcohol use: Never     Comment: socially    Drug use: No    Sexual activity: Not on file   Other Topics Concern    Not on file   Social History Narrative    Not on file     Social Determinants of Health     Financial Resource Strain:     Difficulty of Paying Living Expenses:    Food Insecurity:     Worried About [address] in the Last Year:     [address] [employer] [facility] in the Last Year:    Transportation Needs:     Lack of Transportation (Medical):  Lack of Transportation (Non-Medical):    Physical Activity:     Days of Exercise per Week:     Minutes of Exercise per Session:    Stress:     Feeling of Stress :    Social Connections:     Frequency of Communication with Friends and Family:     Frequency of Social Gatherings with Friends and Family:     Attends Sabianism Services:     Active Member of Clubs or Organizations:     Attends Club or Organization Meetings:     Marital Status:    Intimate Partner Violence:     Fear of Current or Ex-Partner:     Emotionally Abused:     Physically Abused:     Sexually Abused:         Allergies   Allergen Reactions    Sodium Hypochlorite Other (See Comments)     Nausea and vomiting    Benzonatate Other (See Comments)     felt drunk    Morphine     Penicillins     Other Rash     Chlorine bleach         Current Outpatient Medications:     acetaminophen (TYLENOL) 325 mg tablet, Take 2 tablets (650 mg total) by mouth every 6 (six) hours as needed for mild pain, headaches or fever, Disp:  , Rfl: 0    aspirin (ECOTRIN LOW STRENGTH) 81 mg EC tablet, Take 81 mg by mouth daily, Disp: , Rfl:     atorvastatin (LIPITOR) 40 mg tablet, Take 40 mg by mouth daily, Disp: , Rfl:     calcium acetate (PHOSLO) capsule, Take 1 capsule (667 mg total) by mouth 3 (three) times a day with meals, Disp: 90 capsule, Rfl: 1    carvedilol (COREG) 12 5 mg tablet, Take 1 tablet (12 5 mg total) by mouth 2 (two) times a day, Disp: 60 tablet, Rfl: 1    clopidogrel (PLAVIX) 75 mg tablet, Take 75 mg by mouth daily, Disp: , Rfl:     insulin glargine (Toujeo SoloStar) 300 units/mL CONCENTRATED U-300 injection pen (1-unit dial), Inject 25 Units under the skin daily before breakfast, Disp: 4 5 mL, Rfl: 0    insulin lispro (HumaLOG) 100 units/mL injection, Inject 3 Units under the skin 3 (three) times a day with meals, Disp: 3 mL, Rfl: 1    lisinopril (ZESTRIL) 20 mg tablet, Take 20 mg by mouth daily , Disp: , Rfl:     midodrine (PROAMATINE) 10 MG tablet, Take 1 tablet (10 mg total) by mouth Before Dialysis (Please give 30-45 minutes prior to dialysis on April 3rd please), Disp: 30 tablet, Rfl: 1    nitroglycerin (NITROSTAT) 0 4 mg SL tablet, Place 0 4 mg under the tongue , Disp: , Rfl:     B Complex-C-Folic Acid (Liat-Cristy) TABS, Take 1 tablet by mouth daily  (Patient not taking: Reported on 8/18/2021), Disp: , Rfl:     oxyCODONE (ROXICODONE) 5 mg immediate release tablet, Take 1 tablet (5 mg total) by mouth every 6 (six) hours as needed for severe pain for up to 10 dosesMax Daily Amount: 20 mg (Patient not taking: Reported on 8/18/2021), Disp: 10 tablet, Rfl: 0

## 2021-08-18 ENCOUNTER — OFFICE VISIT (OUTPATIENT)
Dept: VASCULAR SURGERY | Facility: CLINIC | Age: 66
End: 2021-08-18

## 2021-08-18 VITALS
HEART RATE: 76 BPM | HEIGHT: 64 IN | BODY MASS INDEX: 40.97 KG/M2 | WEIGHT: 240 LBS | DIASTOLIC BLOOD PRESSURE: 58 MMHG | TEMPERATURE: 99.6 F | SYSTOLIC BLOOD PRESSURE: 132 MMHG

## 2021-08-18 DIAGNOSIS — Z99.2 END-STAGE RENAL DISEASE ON HEMODIALYSIS (HCC): Primary | ICD-10-CM

## 2021-08-18 DIAGNOSIS — N18.6 END-STAGE RENAL DISEASE ON HEMODIALYSIS (HCC): Primary | ICD-10-CM

## 2021-08-18 PROCEDURE — 1124F ACP DISCUSS-NO DSCNMKR DOCD: CPT | Performed by: SURGERY

## 2021-08-18 PROCEDURE — 3008F BODY MASS INDEX DOCD: CPT | Performed by: INTERNAL MEDICINE

## 2021-08-18 PROCEDURE — 99024 POSTOP FOLLOW-UP VISIT: CPT | Performed by: SURGERY

## 2021-08-18 NOTE — ASSESSMENT & PLAN NOTE
Lab Results   Component Value Date    EGFR 11 07/23/2021    EGFR 17 07/13/2021    EGFR 9 04/05/2021    CREATININE 4 06 (H) 07/23/2021    CREATININE 2 82 (H) 07/13/2021    CREATININE 4 84 (H) 04/05/2021    Presents almost 1 month status post creation of left upper arm AV fistula  She has an excellent bruit and thrill an easily palpable radial pulse with no steal symptoms  Her incision is healing clean and dry  Plan:  Follow-up hemodialysis access evaluation for maturity in early to mid September and then back in the office for further evaluation and recommendations for accessing at that time

## 2021-08-18 NOTE — LETTER
August 18, 2021     Juris Kawasaki, DO  [address]    Patient: Jerome Oconnell   YOB: 1955   Date of Visit: 8/18/2021       Dear Dr Luciano Schwartz:    Thank you for referring Pee Benoit to me for evaluation  Below are my notes for this consultation  If you have questions, please do not hesitate to call me  I look forward to following your patient along with you           Sincerely,        Truong Horowitz MD        CC: Nir Meneses [facility]

## 2021-08-18 NOTE — PATIENT INSTRUCTIONS
Presents almost 1 month status post creation of left upper arm AV fistula  She has an excellent bruit and thrill an easily palpable radial pulse with no steal symptoms  Her incision is healing clean and dry  Plan:  Follow-up hemodialysis access evaluation for maturity in early to mid September and then back in the office for further evaluation and recommendations for accessing at that time

## 2021-09-04 ENCOUNTER — HOSPITAL ENCOUNTER (EMERGENCY)
Age: 66
Discharge: HOME | End: 2021-09-04
Attending: EMERGENCY MEDICINE
Payer: COMMERCIAL

## 2021-09-04 VITALS
HEART RATE: 71 BPM | DIASTOLIC BLOOD PRESSURE: 78 MMHG | SYSTOLIC BLOOD PRESSURE: 131 MMHG | OXYGEN SATURATION: 96 % | BODY MASS INDEX: 40.99 KG/M2 | TEMPERATURE: 97.8 F | WEIGHT: 240.08 LBS | RESPIRATION RATE: 20 BRPM | HEIGHT: 64 IN

## 2021-09-04 DIAGNOSIS — N18.5 STAGE 5 CHRONIC KIDNEY DISEASE NOT ON CHRONIC DIALYSIS (HCC): Primary | ICD-10-CM

## 2021-09-04 LAB
ALBUMIN SERPL-MCNC: 4.4 G/DL (ref 3.5–5.2)
ALP BLD-CCNC: 135 U/L (ref 35–104)
ALT SERPL-CCNC: 20 U/L (ref 0–32)
ANION GAP SERPL CALCULATED.3IONS-SCNC: 17 MMOL/L (ref 7–16)
AST SERPL-CCNC: 20 U/L (ref 0–31)
BASOPHILS ABSOLUTE: 0.04 E9/L (ref 0–0.2)
BASOPHILS RELATIVE PERCENT: 0.5 % (ref 0–2)
BILIRUB SERPL-MCNC: 0.4 MG/DL (ref 0–1.2)
BUN BLDV-MCNC: 64 MG/DL (ref 6–23)
CALCIUM SERPL-MCNC: 8.7 MG/DL (ref 8.6–10.2)
CHLORIDE BLD-SCNC: 103 MMOL/L (ref 98–107)
CO2: 22 MMOL/L (ref 22–29)
CREAT SERPL-MCNC: 6.2 MG/DL (ref 0.5–1)
EOSINOPHILS ABSOLUTE: 0.56 E9/L (ref 0.05–0.5)
EOSINOPHILS RELATIVE PERCENT: 6.9 % (ref 0–6)
GFR AFRICAN AMERICAN: 8
GFR NON-AFRICAN AMERICAN: 7 ML/MIN/1.73
GLUCOSE BLD-MCNC: 179 MG/DL (ref 74–99)
HCT VFR BLD CALC: 38.3 % (ref 34–48)
HEMOGLOBIN: 12.4 G/DL (ref 11.5–15.5)
IMMATURE GRANULOCYTES #: 0.03 E9/L
IMMATURE GRANULOCYTES %: 0.4 % (ref 0–5)
LYMPHOCYTES ABSOLUTE: 1.38 E9/L (ref 1.5–4)
LYMPHOCYTES RELATIVE PERCENT: 16.9 % (ref 20–42)
MCH RBC QN AUTO: 31.2 PG (ref 26–35)
MCHC RBC AUTO-ENTMCNC: 32.4 % (ref 32–34.5)
MCV RBC AUTO: 96.5 FL (ref 80–99.9)
MONOCYTES ABSOLUTE: 0.47 E9/L (ref 0.1–0.95)
MONOCYTES RELATIVE PERCENT: 5.8 % (ref 2–12)
NEUTROPHILS ABSOLUTE: 5.68 E9/L (ref 1.8–7.3)
NEUTROPHILS RELATIVE PERCENT: 69.5 % (ref 43–80)
PDW BLD-RTO: 13.6 FL (ref 11.5–15)
PLATELET # BLD: 189 E9/L (ref 130–450)
PMV BLD AUTO: 10.7 FL (ref 7–12)
POTASSIUM REFLEX MAGNESIUM: 5.2 MMOL/L (ref 3.5–5)
RBC # BLD: 3.97 E12/L (ref 3.5–5.5)
SODIUM BLD-SCNC: 142 MMOL/L (ref 132–146)
TOTAL PROTEIN: 7.8 G/DL (ref 6.4–8.3)
WBC # BLD: 8.2 E9/L (ref 4.5–11.5)

## 2021-09-04 PROCEDURE — 85025 COMPLETE CBC W/AUTO DIFF WBC: CPT

## 2021-09-04 PROCEDURE — 36415 COLL VENOUS BLD VENIPUNCTURE: CPT

## 2021-09-04 PROCEDURE — 90935 HEMODIALYSIS ONE EVALUATION: CPT | Performed by: INTERNAL MEDICINE

## 2021-09-04 PROCEDURE — 86706 HEP B SURFACE ANTIBODY: CPT

## 2021-09-04 PROCEDURE — 99283 EMERGENCY DEPT VISIT LOW MDM: CPT

## 2021-09-04 PROCEDURE — 80053 COMPREHEN METABOLIC PANEL: CPT

## 2021-09-04 PROCEDURE — 80074 ACUTE HEPATITIS PANEL: CPT

## 2021-09-04 RX ORDER — ASPIRIN 81 MG/1
81 TABLET ORAL DAILY
COMMUNITY
End: 2022-09-22 | Stop reason: ALTCHOICE

## 2021-09-04 RX ORDER — CARVEDILOL 12.5 MG/1
6.25 TABLET ORAL 2 TIMES DAILY WITH MEALS
COMMUNITY
End: 2022-09-22 | Stop reason: ALTCHOICE

## 2021-09-04 RX ORDER — SODIUM CHLORIDE 0.9 % (FLUSH) 0.9 %
SYRINGE (ML) INJECTION
Status: DISCONTINUED
Start: 2021-09-04 | End: 2021-09-04 | Stop reason: HOSPADM

## 2021-09-04 RX ORDER — CLOPIDOGREL BISULFATE 75 MG/1
75 TABLET ORAL DAILY
COMMUNITY
End: 2022-09-22 | Stop reason: ALTCHOICE

## 2021-09-04 RX ORDER — ATORVASTATIN CALCIUM 40 MG/1
40 TABLET, FILM COATED ORAL DAILY
COMMUNITY
End: 2022-09-22 | Stop reason: ALTCHOICE

## 2021-09-04 RX ORDER — INSULIN GLARGINE 300 U/ML
25 INJECTION, SOLUTION SUBCUTANEOUS DAILY
COMMUNITY
End: 2022-09-22 | Stop reason: ALTCHOICE

## 2021-09-04 ASSESSMENT — PAIN SCALES - GENERAL: PAINLEVEL_OUTOF10: 0

## 2021-09-04 ASSESSMENT — ENCOUNTER SYMPTOMS
SHORTNESS OF BREATH: 0
ABDOMINAL DISTENTION: 0
CHEST TIGHTNESS: 0
SORE THROAT: 0
COUGH: 0
CHOKING: 0
DIARRHEA: 0
NAUSEA: 0
APNEA: 0
VOMITING: 0
ABDOMINAL PAIN: 0

## 2021-09-04 NOTE — ED NOTES
Bed:  [place] [address]  Expected date: 9/4/21  Expected time: 5:45 PM  Means of arrival:   Comments:  No bed     Dallas Rothman RN  09/04/21 8771

## 2021-09-04 NOTE — FLOWSHEET NOTE
09/04/21 1747   Vital Signs   /78   Temp 97.8 °F (36.6 °C)   Pulse 71   Resp 20   Weight   (cart)   Pain Assessment   Pain Assessment 0-10   Pain Level 0   Post-Hemodialysis Assessment   Machine Disinfection Process Acid/Vinegar Clean;Bleach; Exterior Machine Disinfection   Rinseback Volume (ml) 300 ml   Total Liters Processed (l/min) 85.5 l/min   Dialyzer Clearance Moderately streaked   Duration of Treatment (minutes) 240 minutes   Hemodialysis Intake (ml) 300 ml   Hemodialysis Output (ml) 1532 ml   NET Removed (ml) 1200 ml   Tolerated Treatment Fair   Patient Response to Treatment minimal fluid removed d/t c/o cramping   Bilateral Breath Sounds Clear   Edema Generalized   Physician Notified?  No
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
## 2021-09-07 LAB
HAV IGM SER IA-ACNC: NORMAL
HBV SURFACE AB TITR SER: NORMAL {TITER}
HEPATITIS B CORE IGM ANTIBODY: NORMAL
HEPATITIS B SURFACE ANTIGEN INTERPRETATION: NORMAL
HEPATITIS C ANTIBODY INTERPRETATION: NORMAL

## 2021-09-17 ENCOUNTER — HOSPITAL ENCOUNTER (OUTPATIENT)
Dept: NON INVASIVE DIAGNOSTICS | Age: 66
Discharge: HOME | End: 2021-09-17
Attending: SURGERY
Payer: COMMERCIAL

## 2021-09-17 DIAGNOSIS — N18.6 END-STAGE RENAL DISEASE ON HEMODIALYSIS (HCC): ICD-10-CM

## 2021-09-17 DIAGNOSIS — Z99.2 END-STAGE RENAL DISEASE ON HEMODIALYSIS (HCC): ICD-10-CM

## 2021-09-17 PROCEDURE — 93990 DOPPLER FLOW TESTING: CPT | Performed by: SURGERY

## 2021-09-17 PROCEDURE — 93990 DOPPLER FLOW TESTING: CPT

## 2021-09-21 ENCOUNTER — TELEMEDICINE (OUTPATIENT)
Dept: VASCULAR SURGERY | Facility: CLINIC | Age: 66
End: 2021-09-21

## 2021-09-21 VITALS — WEIGHT: 240 LBS | BODY MASS INDEX: 40.97 KG/M2 | HEIGHT: 64 IN

## 2021-09-21 DIAGNOSIS — Z99.2 END-STAGE RENAL DISEASE ON HEMODIALYSIS (HCC): Primary | ICD-10-CM

## 2021-09-21 DIAGNOSIS — I77.0 AVF (ARTERIOVENOUS FISTULA) (HCC): ICD-10-CM

## 2021-09-21 DIAGNOSIS — N18.6 END-STAGE RENAL DISEASE ON HEMODIALYSIS (HCC): Primary | ICD-10-CM

## 2021-09-21 PROCEDURE — 99024 POSTOP FOLLOW-UP VISIT: CPT | Performed by: SURGERY

## 2021-09-21 NOTE — PATIENT INSTRUCTIONS
AVF (arteriovenous fistula) (HCC)       ESRD s/p left arm brachiocephalic arteriovenous fistula creation in 7/26/21  Presents to review hemodialysis access scan to assess AVF maturity       Hemodialysis access scan suggests adequate diameter of the AVF (8-9 6mm) with inadequate flow volumes and increased depth of the AVF     -Discussed findings of the hemodialysis access scan  The AVF is not matured/ready for use  There appear to be areas of inadequate flow volumes and depth 6 7-16mm  She will benefit from a fistulogram and superficialization of the AVF  -She will be moving to [place] this weekend  Recommend establishing care with new vascular surgeon to reassess dialysis access and discuss revision

## 2021-09-21 NOTE — PROGRESS NOTES
Virtual Brief Visit    Verification of patient location:    Patient is located in the following state in which I hold an active license [place]      Assessment/Plan:    Problem List Items Addressed This Visit        Cardiovascular and Mediastinum    [facility]     ESRD s/p left arm brachiocephalic arteriovenous fistula creation in 7/26/21  Presents to review hemodialysis access scan to assess AVF maturity  Hemodialysis access scan suggests adequate diameter of the AVF (8-9 6mm) with inadequate flow volumes and increased depth of the AVF  -Discussed findings of the hemodialysis access scan  The AVF is not matured/ready for use  There appear to be areas of inadequate flow volumes and depth 6 7-16mm  She will benefit from a fistulogram and superficialization of the AVF  -She will be moving to [place] this weekend  Recommend establishing care with new vascular surgeon to reassess dialysis access and discuss revision  Genitourinary    End-stage renal disease on hemodialysis ([facility] [address] ) - Primary                Reason for visit is   Chief Complaint   Patient presents with    Virtual Brief Visit    Virtual Brief Visit        Encounter provider Luis Nicholson MD    Provider located at [address] [place] [address]  [place] [id number]    Recent Visits  No visits were found meeting these conditions  Showing recent visits within past 7 days and meeting all other requirements  Today's Visits  Date Type Provider Dept   09/21/21 Telemedicine Luis Nicholsno MD [id number] today's visits and meeting all other requirements  Future Appointments  No visits were found meeting these conditions  Showing future appointments within next 150 days and meeting all other requirements       After connecting through telephone and patient was informed that this is not a secure, HIPAA-compliant platform  She agrees to proceed  , the patient was identified by name and date of birth  Chandler Ahmadi was informed that this is a telemedicine visit and that the visit is being conducted through telephone and patient was informed that this is not a secure, HIPAA-compliant platform  She agrees to proceed  My office door was closed  No one else was in the room  She acknowledged consent and understanding of privacy and security of the platform  The patient has agreed to participate and understands she can discontinue the visit at any time  Patient is aware this is a billable service  Subjective    Chandler Ahmadi is a 73 y o  female with ESRD dialyzing via permacath, s/p LUE AVF creation 2021 who presents to review hemodialysis access scan to assess maturity  She is able to feel a good thrill over her LUE AVF  She has no complaints  She is moving to [place] this weekend and will be starting HD on Tuesday in [place]  She is planning to establish care with a nephrologist and vascular surgeon in [place]        Past Medical History:   Diagnosis Date    Cardiac disease     CHF (congestive heart failure) ([facility] [address] )     Coronary artery disease     Diabetes mellitus ([facility] [address] )     Hyperlipidemia     Hypertension     Renal disorder     TIA (transient ischemic attack)        Past Surgical History:   Procedure Laterality Date    CARDIAC SURGERY       SECTION      CORONARY ANGIOPLASTY WITH STENT PLACEMENT      CT NEEDLE BIOPSY KIDNEY  3/25/2021    IR TUNNELED DIALYSIS CATHETER PLACEMENT  3/30/2021    ND ANASTOMOSIS,AV,ANY SITE Left 2021    Procedure: CREATION FISTULA ARTERIOVENOUS (AV) left upper extremity;  Surgeon: Martínez Santos MD;  Location:  MAIN OR;  Service: Vascular       Current Outpatient Medications   Medication Sig Dispense Refill    acetaminophen (TYLENOL) 325 mg tablet Take 2 tablets (650 mg total) by mouth every 6 (six) hours as needed for mild pain, headaches or fever  0    aspirin (ECOTRIN LOW STRENGTH) 81 mg EC tablet Take 81 mg by mouth daily      atorvastatin (LIPITOR) 40 mg tablet Take 40 mg by mouth daily      calcium acetate (PHOSLO) capsule Take 1 capsule (667 mg total) by mouth 3 (three) times a day with meals 90 capsule 1    carvedilol (COREG) 12 5 mg tablet Take 1 tablet (12 5 mg total) by mouth 2 (two) times a day 60 tablet 1    clopidogrel (PLAVIX) 75 mg tablet Take 75 mg by mouth daily      insulin aspart (NovoLOG) 100 units/mL injection Inject 3 Units under the skin      insulin glargine (Toujeo SoloStar) 300 units/mL CONCENTRATED U-300 injection pen (1-unit dial) Inject 25 Units under the skin daily before breakfast 4 5 mL 0    midodrine (PROAMATINE) 10 MG tablet Take 1 tablet (10 mg total) by mouth Before Dialysis (Please give 30-45 minutes prior to dialysis on April 3rd please) 30 tablet 1    nitroglycerin (NITROSTAT) 0 4 mg SL tablet Place 0 4 mg under the tongue       B Complex-C-Folic Acid (Liat-Cristy) TABS Take 1 tablet by mouth daily  (Patient not taking: Reported on 8/18/2021)      insulin lispro (HumaLOG) 100 units/mL injection Inject 3 Units under the skin 3 (three) times a day with meals 3 mL 1     No current facility-administered medications for this visit  Allergies   Allergen Reactions    Sodium Hypochlorite Other (See Comments)     Nausea and vomiting    Benzonatate Other (See Comments)     felt drunk    Morphine     Penicillins     Other Rash     Chlorine bleach       Review of Systems   Constitutional: Negative  HENT: Negative  Eyes: Positive for visual disturbance  Cataracts both eyes   Respiratory: Negative  Cardiovascular: Negative  Gastrointestinal: Negative  Endocrine: Negative  Genitourinary: Negative  Musculoskeletal: Negative  Skin: Negative  Allergic/Immunologic: Negative  Neurological: Positive for dizziness  Dizziness during dialysis   Hematological: Negative  Psychiatric/Behavioral: Negative          Vitals:    09/21/21 0848   Weight: 109 kg (240 lb) Height: 5' 4" (1 626 m)         I spent 10 minutes with patient today in which greater than 50% of the time was spent in counseling/coordination of care regarding LUE AVF maturity and duplex study    VIRTUAL VISIT [facility] verbally agrees to participate in [employer]  Pt is aware that [employer] could be limited without vital signs or the ability to perform a full hands-on physical Wanna Holts understands she or the provider may request at any time to terminate the video visit and request the patient to seek care or treatment in person

## 2021-09-21 NOTE — LETTER
September 21, 2021     Kristine Rizzo DO  [address]    Patient: Phil Almonte   YOB: 1955   Date of Visit: 9/21/2021       Dear Dr Pio Rodríguez:    Thank you for referring Neisha Hernandez to me for evaluation  Below are the relevant portions of my assessment and plan of care          AVF (arteriovenous fistula) ([address] )       ESRD s/p left arm brachiocephalic arteriovenous fistula creation in 7/26/21  Presents to review hemodialysis access scan to assess AVF maturity       Hemodialysis access scan suggests adequate diameter of the AVF (8-9 6mm) with inadequate flow volumes and increased depth of the AVF     -Discussed findings of the hemodialysis access scan  The AVF is not matured/ready for use  There appear to be areas of inadequate flow volumes and depth 6 7-16mm  She will benefit from a fistulogram and superficialization of the AVF  -She will be moving to [place] this weekend  Recommend establishing care with new vascular surgeon to reassess dialysis access and discuss revision  If you have questions, please do not hesitate to call me  I look forward to following Makayla Hammonds along with you           Sincerely,        Rain Wesley MD        CC: Lex Randall DO

## 2021-09-21 NOTE — ASSESSMENT & PLAN NOTE
ESRD s/p left arm brachiocephalic arteriovenous fistula creation in 7/26/21  Presents to review hemodialysis access scan to assess AVF maturity  Hemodialysis access scan suggests adequate diameter of the AVF (8-9 6mm) with inadequate flow volumes and increased depth of the AVF  -Discussed findings of the hemodialysis access scan  The AVF is not matured/ready for use  There appear to be areas of inadequate flow volumes and depth 6 7-16mm  She will benefit from a fistulogram and superficialization of the AVF  -[place] this weekend  Recommend establishing care with new vascular surgeon to reassess dialysis access and discuss revision

## 2021-11-17 ENCOUNTER — APPOINTMENT (OUTPATIENT)
Dept: GENERAL RADIOLOGY | Age: 66
DRG: 314 | End: 2021-11-17
Payer: MEDICARE

## 2021-11-17 LAB
ALBUMIN SERPL-MCNC: 3.9 G/DL (ref 3.5–5.2)
ALP BLD-CCNC: 135 U/L (ref 35–104)
ALT SERPL-CCNC: 14 U/L (ref 0–32)
ANION GAP SERPL CALCULATED.3IONS-SCNC: 16 MMOL/L (ref 7–16)
AST SERPL-CCNC: 18 U/L (ref 0–31)
BASOPHILS ABSOLUTE: 0.03 E9/L (ref 0–0.2)
BASOPHILS RELATIVE PERCENT: 0.2 % (ref 0–2)
BILIRUB SERPL-MCNC: 0.5 MG/DL (ref 0–1.2)
BUN BLDV-MCNC: 55 MG/DL (ref 6–23)
CALCIUM SERPL-MCNC: 8.6 MG/DL (ref 8.6–10.2)
CHLORIDE BLD-SCNC: 103 MMOL/L (ref 98–107)
CO2: 21 MMOL/L (ref 22–29)
CREAT SERPL-MCNC: 5 MG/DL (ref 0.5–1)
EOSINOPHILS ABSOLUTE: 0.15 E9/L (ref 0.05–0.5)
EOSINOPHILS RELATIVE PERCENT: 1 % (ref 0–6)
GFR AFRICAN AMERICAN: 10
GFR NON-AFRICAN AMERICAN: 9 ML/MIN/1.73
GLUCOSE BLD-MCNC: 216 MG/DL (ref 74–99)
HCT VFR BLD CALC: 33.3 % (ref 34–48)
HEMOGLOBIN: 11 G/DL (ref 11.5–15.5)
IMMATURE GRANULOCYTES #: 0.09 E9/L
IMMATURE GRANULOCYTES %: 0.6 % (ref 0–5)
LACTIC ACID: 1.6 MMOL/L (ref 0.5–2.2)
LIPASE: 55 U/L (ref 13–60)
LYMPHOCYTES ABSOLUTE: 0.76 E9/L (ref 1.5–4)
LYMPHOCYTES RELATIVE PERCENT: 5.1 % (ref 20–42)
MCH RBC QN AUTO: 32.7 PG (ref 26–35)
MCHC RBC AUTO-ENTMCNC: 33 % (ref 32–34.5)
MCV RBC AUTO: 99.1 FL (ref 80–99.9)
MONOCYTES ABSOLUTE: 0.62 E9/L (ref 0.1–0.95)
MONOCYTES RELATIVE PERCENT: 4.1 % (ref 2–12)
NEUTROPHILS ABSOLUTE: 13.34 E9/L (ref 1.8–7.3)
NEUTROPHILS RELATIVE PERCENT: 89 % (ref 43–80)
PDW BLD-RTO: 13.5 FL (ref 11.5–15)
PLATELET # BLD: 116 E9/L (ref 130–450)
PMV BLD AUTO: 10.6 FL (ref 7–12)
POTASSIUM SERPL-SCNC: 5.1 MMOL/L (ref 3.5–5)
RBC # BLD: 3.36 E12/L (ref 3.5–5.5)
SARS-COV-2, NAAT: NOT DETECTED
SODIUM BLD-SCNC: 140 MMOL/L (ref 132–146)
TOTAL PROTEIN: 7.5 G/DL (ref 6.4–8.3)
WBC # BLD: 15 E9/L (ref 4.5–11.5)

## 2021-11-17 PROCEDURE — 85025 COMPLETE CBC W/AUTO DIFF WBC: CPT

## 2021-11-17 PROCEDURE — 93005 ELECTROCARDIOGRAM TRACING: CPT | Performed by: PHYSICIAN ASSISTANT

## 2021-11-17 PROCEDURE — 99284 EMERGENCY DEPT VISIT MOD MDM: CPT

## 2021-11-17 PROCEDURE — 6370000000 HC RX 637 (ALT 250 FOR IP): Performed by: PHYSICIAN ASSISTANT

## 2021-11-17 PROCEDURE — 83690 ASSAY OF LIPASE: CPT

## 2021-11-17 PROCEDURE — 96374 THER/PROPH/DIAG INJ IV PUSH: CPT

## 2021-11-17 PROCEDURE — 83605 ASSAY OF LACTIC ACID: CPT

## 2021-11-17 PROCEDURE — 96372 THER/PROPH/DIAG INJ SC/IM: CPT

## 2021-11-17 PROCEDURE — 6360000002 HC RX W HCPCS: Performed by: PHYSICIAN ASSISTANT

## 2021-11-17 PROCEDURE — 80053 COMPREHEN METABOLIC PANEL: CPT

## 2021-11-17 PROCEDURE — 87635 SARS-COV-2 COVID-19 AMP PRB: CPT

## 2021-11-17 PROCEDURE — 87186 SC STD MICRODIL/AGAR DIL: CPT

## 2021-11-17 PROCEDURE — 71045 X-RAY EXAM CHEST 1 VIEW: CPT

## 2021-11-17 PROCEDURE — 87040 BLOOD CULTURE FOR BACTERIA: CPT

## 2021-11-17 PROCEDURE — 87077 CULTURE AEROBIC IDENTIFY: CPT

## 2021-11-17 PROCEDURE — 87150 DNA/RNA AMPLIFIED PROBE: CPT

## 2021-11-17 RX ORDER — PROMETHAZINE HYDROCHLORIDE 25 MG/ML
12.5 INJECTION, SOLUTION INTRAMUSCULAR; INTRAVENOUS ONCE
Status: COMPLETED | OUTPATIENT
Start: 2021-11-17 | End: 2021-11-17

## 2021-11-17 RX ORDER — 0.9 % SODIUM CHLORIDE 0.9 %
1000 INTRAVENOUS SOLUTION INTRAVENOUS ONCE
Status: COMPLETED | OUTPATIENT
Start: 2021-11-17 | End: 2021-11-18

## 2021-11-17 RX ORDER — ACETAMINOPHEN 500 MG
1000 TABLET ORAL ONCE
Status: COMPLETED | OUTPATIENT
Start: 2021-11-17 | End: 2021-11-17

## 2021-11-17 RX ADMIN — ACETAMINOPHEN 1000 MG: 500 TABLET ORAL at 21:58

## 2021-11-17 RX ADMIN — PROMETHAZINE HYDROCHLORIDE 12.5 MG: 25 INJECTION INTRAMUSCULAR; INTRAVENOUS at 16:30

## 2021-11-17 ASSESSMENT — PAIN SCALES - GENERAL: PAINLEVEL_OUTOF10: 10

## 2021-11-17 NOTE — ED NOTES
FIRST PROVIDER CONTACT ASSESSMENT NOTE      Department of Emergency Medicine   11/17/21  4:10 PM EST    Chief Complaint: No chief complaint on file. History of Present Illness:    Deandre Franklin is a 73 y.o. female who presents to the ED by private car for fever, nausea    Focused Screening Exam:  Constitutional:  Alert, appears stated age and is in no distress.       *ALLERGIES*     Morphine, Other, and Pcn [penicillins]     ED Triage Vitals [11/17/21 1546]   BP Temp Temp src Pulse Resp SpO2 Height Weight   -- -- -- 90 -- 91 % -- --        Initial Plan of Care:  Initiate Treatment-Testing, Proceed toTreatment Area When Bed Available for ED Attending/MLP to Continue Care    -----------------[address] ASSESSMENT NOTE--------------  Electronically signed by Author Marile PA-C   DD: 11/17/21     Author Mariel PA-C  11/17/21 8758

## 2021-11-18 ENCOUNTER — APPOINTMENT (OUTPATIENT)
Dept: CT IMAGING | Age: 66
DRG: 314 | End: 2021-11-18
Payer: MEDICARE

## 2021-11-18 ENCOUNTER — HOSPITAL ENCOUNTER (INPATIENT)
Age: 66
LOS: 8 days | Discharge: NURSING, REHAB OR LONG-TERM CARE | DRG: 314 | End: 2021-11-26
Attending: EMERGENCY MEDICINE | Admitting: FAMILY MEDICINE
Payer: MEDICARE

## 2021-11-18 DIAGNOSIS — N18.6 ESRD ON HEMODIALYSIS (HCC): ICD-10-CM

## 2021-11-18 DIAGNOSIS — R50.9 FEVER, UNSPECIFIED: Primary | ICD-10-CM

## 2021-11-18 DIAGNOSIS — Z99.2 ESRD ON HEMODIALYSIS (HCC): ICD-10-CM

## 2021-11-18 PROBLEM — R11.2 NAUSEA & VOMITING: Status: ACTIVE | Noted: 2021-11-18

## 2021-11-18 PROBLEM — R78.81 BACTEREMIA: Status: ACTIVE | Noted: 2021-11-18

## 2021-11-18 LAB
BACTERIA: ABNORMAL /HPF
BILIRUBIN URINE: NEGATIVE
BLOOD, URINE: ABNORMAL
BOTTLE TYPE: ABNORMAL
CANDIDA ALBICANS BY PCR: NOT DETECTED
CANDIDA GLABRATA BY PCR: NOT DETECTED
CANDIDA KRUSEI BY PCR: NOT DETECTED
CANDIDA PARAPSILOSIS BY PCR: NOT DETECTED
CANDIDA TROPICALIS BY PCR: NOT DETECTED
CLARITY: CLEAR
COLOR: YELLOW
EKG ATRIAL RATE: 106 BPM
EKG P AXIS: 29 DEGREES
EKG P-R INTERVAL: 148 MS
EKG Q-T INTERVAL: 358 MS
EKG QRS DURATION: 84 MS
EKG QTC CALCULATION (BAZETT): 475 MS
EKG R AXIS: 2 DEGREES
EKG T AXIS: 56 DEGREES
EKG VENTRICULAR RATE: 106 BPM
ENTEROBACTER CLOACAE COMPLEX BY PCR: NOT DETECTED
ENTEROBACTERALES BY PCR: NOT DETECTED
ENTEROCOCCUS BY PCR: NOT DETECTED
EPITHELIAL CELLS, UA: ABNORMAL /HPF
ESCHERICHIA COLI BY PCR: NOT DETECTED
FINE CASTS, UA: ABNORMAL /LPF (ref 0–2)
GLUCOSE URINE: 500 MG/DL
HAEMOPHILUS INFLUENZAE BY PCR: NOT DETECTED
INR BLD: 1.2
KETONES, URINE: ABNORMAL MG/DL
KLEBSIELLA OXYTOCA BY PCR: NOT DETECTED
KLEBSIELLA PNEUMONIAE GROUP BY PCR: NOT DETECTED
LEUKOCYTE ESTERASE, URINE: NEGATIVE
LISTERIA MONOCYTOGENES BY PCR: NOT DETECTED
METER GLUCOSE: 139 MG/DL (ref 74–99)
METER GLUCOSE: 150 MG/DL (ref 74–99)
METER GLUCOSE: 229 MG/DL (ref 74–99)
METER GLUCOSE: 301 MG/DL (ref 74–99)
METER GLUCOSE: 311 MG/DL (ref 74–99)
METHICILLIN RESISTANCE MECA/C  BY PCR: NOT DETECTED
NEISSERIA MENINGITIDIS BY PCR: NOT DETECTED
NITRITE, URINE: NEGATIVE
ORDER NUMBER: ABNORMAL
PH UA: 6 (ref 5–9)
PROCALCITONIN: 32.33 NG/ML (ref 0–0.08)
PROTEIN UA: >=300 MG/DL
PROTEUS SPECIES BY PCR: NOT DETECTED
PROTHROMBIN TIME: 13.5 SEC (ref 9.3–12.4)
PSEUDOMONAS AERUGINOSA BY PCR: NOT DETECTED
RBC UA: ABNORMAL /HPF (ref 0–2)
SERRATIA MARCESCENS BY PCR: NOT DETECTED
SOURCE OF BLOOD CULTURE: ABNORMAL
SPECIFIC GRAVITY UA: 1.02 (ref 1–1.03)
STAPHYLOCOCCUS AUREUS BY PCR: DETECTED
STAPHYLOCOCCUS SPECIES BY PCR: DETECTED
STREPTOCOCCUS AGALACTIAE BY PCR: NOT DETECTED
STREPTOCOCCUS PNEUMONIAE BY PCR: NOT DETECTED
STREPTOCOCCUS PYOGENES  BY PCR: NOT DETECTED
STREPTOCOCCUS SPECIES BY PCR: NOT DETECTED
UROBILINOGEN, URINE: 0.2 E.U./DL
WBC UA: ABNORMAL /HPF (ref 0–5)

## 2021-11-18 PROCEDURE — 36415 COLL VENOUS BLD VENIPUNCTURE: CPT

## 2021-11-18 PROCEDURE — 82962 GLUCOSE BLOOD TEST: CPT

## 2021-11-18 PROCEDURE — 6370000000 HC RX 637 (ALT 250 FOR IP): Performed by: FAMILY MEDICINE

## 2021-11-18 PROCEDURE — 93010 ELECTROCARDIOGRAM REPORT: CPT | Performed by: INTERNAL MEDICINE

## 2021-11-18 PROCEDURE — 1200000000 HC SEMI PRIVATE

## 2021-11-18 PROCEDURE — 74176 CT ABD & PELVIS W/O CONTRAST: CPT

## 2021-11-18 PROCEDURE — 6360000002 HC RX W HCPCS: Performed by: FAMILY MEDICINE

## 2021-11-18 PROCEDURE — 2580000003 HC RX 258: Performed by: FAMILY MEDICINE

## 2021-11-18 PROCEDURE — 2580000003 HC RX 258: Performed by: INTERNAL MEDICINE

## 2021-11-18 PROCEDURE — 87340 HEPATITIS B SURFACE AG IA: CPT

## 2021-11-18 PROCEDURE — 87040 BLOOD CULTURE FOR BACTERIA: CPT

## 2021-11-18 PROCEDURE — 81001 URINALYSIS AUTO W/SCOPE: CPT

## 2021-11-18 PROCEDURE — 2580000003 HC RX 258: Performed by: EMERGENCY MEDICINE

## 2021-11-18 PROCEDURE — 84145 PROCALCITONIN (PCT): CPT

## 2021-11-18 PROCEDURE — 2500000003 HC RX 250 WO HCPCS: Performed by: STUDENT IN AN ORGANIZED HEALTH CARE EDUCATION/TRAINING PROGRAM

## 2021-11-18 PROCEDURE — 6360000002 HC RX W HCPCS: Performed by: INTERNAL MEDICINE

## 2021-11-18 PROCEDURE — 2700000000 HC OXYGEN THERAPY PER DAY

## 2021-11-18 PROCEDURE — 85610 PROTHROMBIN TIME: CPT

## 2021-11-18 PROCEDURE — 90935 HEMODIALYSIS ONE EVALUATION: CPT

## 2021-11-18 PROCEDURE — 6360000002 HC RX W HCPCS: Performed by: EMERGENCY MEDICINE

## 2021-11-18 PROCEDURE — 86706 HEP B SURFACE ANTIBODY: CPT

## 2021-11-18 PROCEDURE — 99223 1ST HOSP IP/OBS HIGH 75: CPT | Performed by: SURGERY

## 2021-11-18 RX ORDER — DEXTROSE MONOHYDRATE 25 G/50ML
12.5 INJECTION, SOLUTION INTRAVENOUS PRN
Status: DISCONTINUED | OUTPATIENT
Start: 2021-11-18 | End: 2021-11-26 | Stop reason: HOSPADM

## 2021-11-18 RX ORDER — SODIUM CHLORIDE 0.9 % (FLUSH) 0.9 %
10 SYRINGE (ML) INJECTION PRN
Status: DISCONTINUED | OUTPATIENT
Start: 2021-11-18 | End: 2021-11-26 | Stop reason: HOSPADM

## 2021-11-18 RX ORDER — ASPIRIN 81 MG/1
81 TABLET ORAL DAILY
Status: DISCONTINUED | OUTPATIENT
Start: 2021-11-18 | End: 2021-11-26 | Stop reason: HOSPADM

## 2021-11-18 RX ORDER — CLOPIDOGREL BISULFATE 75 MG/1
75 TABLET ORAL DAILY
Status: DISCONTINUED | OUTPATIENT
Start: 2021-11-18 | End: 2021-11-26 | Stop reason: HOSPADM

## 2021-11-18 RX ORDER — HEPARIN SODIUM 10000 [USP'U]/ML
5000 INJECTION, SOLUTION INTRAVENOUS; SUBCUTANEOUS EVERY 8 HOURS SCHEDULED
Status: DISCONTINUED | OUTPATIENT
Start: 2021-11-18 | End: 2021-11-26 | Stop reason: HOSPADM

## 2021-11-18 RX ORDER — ONDANSETRON 2 MG/ML
4 INJECTION INTRAMUSCULAR; INTRAVENOUS EVERY 6 HOURS PRN
Status: DISCONTINUED | OUTPATIENT
Start: 2021-11-18 | End: 2021-11-18

## 2021-11-18 RX ORDER — ONDANSETRON 2 MG/ML
4 INJECTION INTRAMUSCULAR; INTRAVENOUS EVERY 6 HOURS PRN
Status: DISCONTINUED | OUTPATIENT
Start: 2021-11-18 | End: 2021-11-26 | Stop reason: HOSPADM

## 2021-11-18 RX ORDER — SODIUM CHLORIDE 9 MG/ML
INJECTION, SOLUTION INTRAVENOUS CONTINUOUS
Status: DISCONTINUED | OUTPATIENT
Start: 2021-11-18 | End: 2021-11-20

## 2021-11-18 RX ORDER — POLYETHYLENE GLYCOL 3350 17 G/17G
17 POWDER, FOR SOLUTION ORAL DAILY PRN
Status: DISCONTINUED | OUTPATIENT
Start: 2021-11-18 | End: 2021-11-26 | Stop reason: HOSPADM

## 2021-11-18 RX ORDER — SODIUM CHLORIDE 0.9 % (FLUSH) 0.9 %
10 SYRINGE (ML) INJECTION EVERY 12 HOURS SCHEDULED
Status: DISCONTINUED | OUTPATIENT
Start: 2021-11-18 | End: 2021-11-26 | Stop reason: HOSPADM

## 2021-11-18 RX ORDER — LIDOCAINE HYDROCHLORIDE AND EPINEPHRINE 10; 10 MG/ML; UG/ML
20 INJECTION, SOLUTION INFILTRATION; PERINEURAL ONCE
Status: COMPLETED | OUTPATIENT
Start: 2021-11-18 | End: 2021-11-18

## 2021-11-18 RX ORDER — ACETAMINOPHEN 325 MG/1
650 TABLET ORAL EVERY 6 HOURS PRN
Status: DISCONTINUED | OUTPATIENT
Start: 2021-11-18 | End: 2021-11-26 | Stop reason: HOSPADM

## 2021-11-18 RX ORDER — ATORVASTATIN CALCIUM 40 MG/1
40 TABLET, FILM COATED ORAL DAILY
Status: DISCONTINUED | OUTPATIENT
Start: 2021-11-18 | End: 2021-11-26 | Stop reason: HOSPADM

## 2021-11-18 RX ORDER — CARVEDILOL 6.25 MG/1
12.5 TABLET ORAL 2 TIMES DAILY WITH MEALS
Status: DISCONTINUED | OUTPATIENT
Start: 2021-11-18 | End: 2021-11-26 | Stop reason: HOSPADM

## 2021-11-18 RX ORDER — SODIUM CHLORIDE 9 MG/ML
25 INJECTION, SOLUTION INTRAVENOUS PRN
Status: DISCONTINUED | OUTPATIENT
Start: 2021-11-18 | End: 2021-11-26 | Stop reason: HOSPADM

## 2021-11-18 RX ORDER — DEXTROSE MONOHYDRATE 50 MG/ML
100 INJECTION, SOLUTION INTRAVENOUS PRN
Status: DISCONTINUED | OUTPATIENT
Start: 2021-11-18 | End: 2021-11-26 | Stop reason: HOSPADM

## 2021-11-18 RX ORDER — ACETAMINOPHEN 650 MG/1
650 SUPPOSITORY RECTAL EVERY 6 HOURS PRN
Status: DISCONTINUED | OUTPATIENT
Start: 2021-11-18 | End: 2021-11-26 | Stop reason: HOSPADM

## 2021-11-18 RX ORDER — INSULIN GLARGINE 100 [IU]/ML
20 INJECTION, SOLUTION SUBCUTANEOUS DAILY
Status: DISCONTINUED | OUTPATIENT
Start: 2021-11-18 | End: 2021-11-26 | Stop reason: HOSPADM

## 2021-11-18 RX ORDER — PROMETHAZINE HYDROCHLORIDE 25 MG/1
12.5 TABLET ORAL EVERY 6 HOURS PRN
Status: DISCONTINUED | OUTPATIENT
Start: 2021-11-18 | End: 2021-11-26 | Stop reason: HOSPADM

## 2021-11-18 RX ORDER — NICOTINE POLACRILEX 4 MG
15 LOZENGE BUCCAL PRN
Status: DISCONTINUED | OUTPATIENT
Start: 2021-11-18 | End: 2021-11-26 | Stop reason: HOSPADM

## 2021-11-18 RX ADMIN — ACETAMINOPHEN 650 MG: 325 TABLET ORAL at 08:55

## 2021-11-18 RX ADMIN — ONDANSETRON HYDROCHLORIDE 4 MG: 2 SOLUTION INTRAMUSCULAR; INTRAVENOUS at 03:04

## 2021-11-18 RX ADMIN — ATORVASTATIN CALCIUM 40 MG: 40 TABLET, FILM COATED ORAL at 08:47

## 2021-11-18 RX ADMIN — LIDOCAINE HYDROCHLORIDE AND EPINEPHRINE 20 ML: 10; 10 INJECTION, SOLUTION INFILTRATION; PERINEURAL at 23:50

## 2021-11-18 RX ADMIN — ASPIRIN 81 MG: 81 TABLET, COATED ORAL at 08:47

## 2021-11-18 RX ADMIN — TRIMETHOBENZAMIDE HYDROCHLORIDE 200 MG: 100 INJECTION INTRAMUSCULAR at 20:39

## 2021-11-18 RX ADMIN — INSULIN LISPRO 3 UNITS: 100 INJECTION, SOLUTION INTRAVENOUS; SUBCUTANEOUS at 08:48

## 2021-11-18 RX ADMIN — CLOPIDOGREL 75 MG: 75 TABLET, FILM COATED ORAL at 08:47

## 2021-11-18 RX ADMIN — ACETAMINOPHEN 650 MG: 325 TABLET ORAL at 20:39

## 2021-11-18 RX ADMIN — SODIUM CHLORIDE: 9 INJECTION, SOLUTION INTRAVENOUS at 06:45

## 2021-11-18 RX ADMIN — CARVEDILOL 12.5 MG: 6.25 TABLET, FILM COATED ORAL at 08:47

## 2021-11-18 RX ADMIN — ONDANSETRON 4 MG: 2 INJECTION INTRAMUSCULAR; INTRAVENOUS at 15:29

## 2021-11-18 RX ADMIN — VANCOMYCIN HYDROCHLORIDE 1500 MG: 10 INJECTION, POWDER, LYOPHILIZED, FOR SOLUTION INTRAVENOUS at 20:40

## 2021-11-18 RX ADMIN — INSULIN GLARGINE 20 UNITS: 100 INJECTION, SOLUTION SUBCUTANEOUS at 08:47

## 2021-11-18 RX ADMIN — ACETAMINOPHEN 650 MG: 325 TABLET ORAL at 15:00

## 2021-11-18 RX ADMIN — Medication 10 ML: at 10:19

## 2021-11-18 RX ADMIN — SODIUM CHLORIDE 1000 ML: 9 INJECTION, SOLUTION INTRAVENOUS at 03:04

## 2021-11-18 RX ADMIN — ONDANSETRON 4 MG: 2 INJECTION INTRAMUSCULAR; INTRAVENOUS at 18:08

## 2021-11-18 RX ADMIN — INSULIN LISPRO 3 UNITS: 100 INJECTION, SOLUTION INTRAVENOUS; SUBCUTANEOUS at 12:15

## 2021-11-18 RX ADMIN — HEPARIN SODIUM 5000 UNITS: 10000 INJECTION INTRAVENOUS; SUBCUTANEOUS at 06:46

## 2021-11-18 ASSESSMENT — PAIN SCALES - GENERAL
PAINLEVEL_OUTOF10: 0
PAINLEVEL_OUTOF10: 5
PAINLEVEL_OUTOF10: 0
PAINLEVEL_OUTOF10: 0
PAINLEVEL_OUTOF10: 4
PAINLEVEL_OUTOF10: 0
PAINLEVEL_OUTOF10: 5
PAINLEVEL_OUTOF10: 0
PAINLEVEL_OUTOF10: 0

## 2021-11-18 NOTE — H&P
Hospitalist History & Physical      PCP: No primary care provider on file. Date of Service: Pt seen/examined on 2021     Chief Complaint:  had concerns including Nausea (started this am ) and Fever (103 at home ). History Of Present Illness:    Ms. Isaura Nevarez, a 73y.o. year old female  who  has a past medical history of Brain aneurysm, Diabetes mellitus ([address] H/O heart artery stent, Hyperlipidemia, and Hypertension. Patient presents to the emergency department with nausea vomiting and fever. This began suddenly yesterday. Patient is a end-stage renal disease hemodialysis patient. Multiple episodes of emesis. Patient denies chest pain or cough, urinary symptoms. Patient is vaccinated for COVID-19. Vital signs reveal the patient to have a temperature of 100.7 remainder of her vital signs are within normal and stable. Operatory studies notable for potassium 5.1, BUN 55, creatinine 5.0, glucose 216, alk phos 135, WBC 15.0. COVID-19 negative. CT abdomen pelvis is unremarkable. Chest x-ray unremarkable. Patient given IV fluids and antiemetics with some relief of her symptoms. Blood cultures were obtained. Medicine consulted for admission. Past Medical History:   Diagnosis Date    Brain aneurysm     Diabetes mellitus ([address]     H/O heart artery stent     Hyperlipidemia     Hypertension        Past Surgical History:   Procedure Laterality Date    CARDIAC SURGERY       SECTION         Prior to Admission medications    Medication Sig Start Date End Date Taking?  Authorizing Provider   atorvastatin (LIPITOR) 40 MG tablet Take 40 mg by mouth daily    Historical Provider, MD   carvedilol (COREG) 12.5 MG tablet Take 12.5 mg by mouth 2 times daily (with meals)    Historical Provider, MD   clopidogrel (PLAVIX) 75 MG tablet Take 75 mg by mouth daily    Historical Provider, MD   aspirin 81 MG EC tablet Take 81 mg by mouth daily    Historical Provider, MD   Insulin Glargine, 2 Unit Dial, (TOUJEO MAX SOLOSTAR) 300 UNIT/ML SOPN Inject 25 Units into the skin daily    Historical Provider, MD   insulin aspart (NOVOLOG) 100 UNIT/ML injection vial Inject 3 Units into the skin 3 times daily (before meals)    Historical Provider, MD         Allergies:  Morphine, Other, and Pcn [penicillins]    Social History:    TOBACCO:   reports that she has quit smoking. She has never used smokeless tobacco.  ETOH:   reports no history of alcohol use. Family History:    Reviewed in detail and negative for DM, CAD, Cancer, CVA. Positive as follows\"  History reviewed. No pertinent family history. REVIEW OF SYSTEMS:   Pertinent positives as noted in the HPI. All other systems reviewed and negative. PHYSICAL EXAM:  /87   Pulse 92   Temp 100.1 °F (37.8 °C) (Oral)   Resp 19   Wt 240 lb (108.9 kg)   SpO2 94%   BMI 41.20 kg/m²   General appearance: No apparent distress, appears stated age and cooperative. HEENT: Normal cephalic, atraumatic without obvious deformity. Pupils equal, round, and reactive to light. Extra ocular muscles intact. Conjunctivae/corneas clear. Neck: Supple, with full range of motion. No jugular venous distention. Trachea midline. Respiratory: Clear to auscultation bilaterally  Cardiovascular: Regular rate and rhythm  Abdomen: Soft, nontender, nondistended  Musculoskeletal: No clubbing, cyanosis, edema of bilateral lower extremities. Brisk capillary refill. Skin: Normal skin color. No rashes or lesions. Neurologic:  Neurovascularly intact without any focal sensory/motor deficits.  Cranial nerves: II-XII intact, grossly non-focal.      CBC:   Recent Labs     11/17/21  1638   WBC 15.0*   RBC 3.36*   HGB 11.0*   HCT 33.3*   MCV 99.1   RDW 13.5   *     BMP:   Recent Labs     11/17/21  1638      K 5.1*      CO2 21*   BUN 55*   CREATININE 5.0*     LFT:  Recent Labs     11/17/21  1638   PROT 7.5   ALKPHOS 135*   ALT 14   AST 18   BILITOT 0.5   LIPASE 55 CE:  No results for input(s): Ardelle Chalk in the last 72 hours. PT/INR: No results for input(s): INR, APTT in the last 72 hours. BNP: No results for input(s): BNP in the last 72 hours. ESR: No results found for: SEDRATE  CRP: No results found for: CRP  D Dimer: No results found for: DDIMER   Folate and B12: No results found for: YBBBRNTU10, No results found for: FOLATE  Lactic Acid:   Lab Results   Component Value Date    LACTA 1.6 11/17/2021     Thyroid Studies: No results found for: TSH, L6RHJDR, G4BWTXL, THYROIDAB    Oupatient labs:  No results found for: CHOL, TRIG, HDL, LDLCALC, TSH, PSA, INR, LABA1C    Urinalysis:    Lab Results   Component Value Date    NITRU Negative 11/18/2021    WBCUA 1-3 11/18/2021    BACTERIA FEW 11/18/2021    RBCUA 0-1 11/18/2021    BLOODU SMALL 11/18/2021    SPECGRAV 1.020 11/18/2021    GLUCOSEU 500 11/18/2021       Imaging:  CT ABDOMEN PELVIS WO CONTRAST Additional Contrast? None    Result Date: 11/18/2021  EXAMINATION: CT OF THE ABDOMEN AND PELVIS WITHOUT CONTRAST 11/17/2021 11:56 pm TECHNIQUE: CT of the abdomen and pelvis was performed without the administration of intravenous contrast. Multiplanar reformatted images are provided for review. Dose modulation, iterative reconstruction, and/or weight based adjustment of the mA/kV was utilized to reduce the radiation dose to as low as reasonably achievable. COMPARISON: None. HISTORY: ORDERING SYSTEM PROVIDED HISTORY: Flank pain fever TECHNOLOGIST PROVIDED HISTORY: Reason for exam:->Flank pain fever Additional Contrast?->None Decision Support Exception - unselect if not a suspected or confirmed emergency medical condition->Emergency Medical Condition (MA) What reading provider will be dictating this exam?->CRC FINDINGS: Lower Chest: The visualized portions of the lung bases are clear. Organs:  Within the limitations of a noncontrast exam, the visualized liver, spleen, pancreas, adrenal glands and kidneys demonstrate no significant abnormality. There is no nephrolithiasis, hydronephrosis, hydroureter or other evidence for acute obstructive uropathy. GI/Bowel: There are no findings of intestinal obstruction. The appendix is not visualized. There is probable cholelithiasis there is mild diverticulosis involving descending colon. There is no acute diverticulitis seen. Pelvis:  Bladder is unremarkable in appearance. There is no abnormal pelvic mass or fluid collection seen. Peritoneum/Retroperitoneum: There is no abdominal aortic aneurysm. There is no abnormal lymphadenopathy seen. There is no abnormal fluid collection. There is no free intraperitoneal air. Bones/Soft Tissues: There is a moderate-sized fat containing umbilical hernia. There is no acute abnormality seen involving visualized osseous structures. 1.  There is no nephrolithiasis, hydronephrosis, hydroureter or other evidence for acute obstructive uropathy. 2. Probable cholelithiasis. 3. Mild diverticulosis. No acute diverticulitis seen. 4. Moderate-sized fat containing umbilical hernia. XR CHEST PORTABLE    Result Date: 11/17/2021  EXAMINATION: ONE XRAY VIEW OF THE CHEST 11/17/2021 10:46 pm COMPARISON: None. HISTORY: ORDERING SYSTEM PROVIDED HISTORY: fever TECHNOLOGIST PROVIDED HISTORY: Reason for exam:->fever What reading provider will be dictating this exam?->CRC FINDINGS: The heart is mildly enlarged. Pulmonary vessels are congested. There is no pneumothorax. The costophrenic angles are clear. Dialysis catheter in place. Mild cardiomegaly and suspect pulmonary vascular congestion.        ASSESSMENT:  -Intractable nausea vomiting  -Fever of unknown origin  -Leukocytosis  -End-stage renal disease on hemodialysis  -Coronary artery disease  -Hypertension  -Hyperlipidemia  -Diabetes      PLAN:  -Admit to medicine  -Consult nephrology  -Antiemetics  -IV fluids  -Monitor blood cultures  -Check procalcitonin  -Continue home medications        Diet: No diet orders on file  Code Status: No Order  Surrogate decision maker confirmed with patient:   Extended Emergency Contact Information  Primary Emergency Contact: Malcolm Rojas  Mobile Phone: [phone]  Relation: Child  Preferred language: English   needed? No    DVT Prophylaxis: []Lovenox []Heparin []PCD [] [address] []Encouraged ambulation  Disposition: []Med/Surg [] Intermediate [] ICU/CCU  Admit status: [] Observation [] Inpatient     +++++++++++++++++++++++++++++++++++++++++++++++++  Kristian Hale DO  [address]  +++++++++++++++++++++++++++++++++++++++++++++++++  NOTE: This report was transcribed using voice recognition software. Every effort was made to ensure accuracy; however, inadvertent computerized transcription errors may be present.

## 2021-11-18 NOTE — ED NOTES
Bed: 16  Expected date:   Expected time:   Means of arrival:   Comments:  Triage     Dolores Humphreys RN  11/18/21 0236

## 2021-11-18 NOTE — ED NOTES
Blood cultures obtained from right AC, per policy. Set (one of two) drawn at this time.                Marcos Weller RN  11/17/21 8527

## 2021-11-18 NOTE — FLOWSHEET NOTE
11/18/21 1642   Vital Signs   BP (!) 167/83   Temp 99.4 °F (37.4 °C)   Pulse 113   Resp 20   Weight 250 lb 3.6 oz (113.5 kg)   Weight Method Bed scale   Percent Weight Change -0.96   Pain Assessment   Pain Assessment 0-10   Pain Level 0   Post-Hemodialysis Assessment   Post-Treatment Procedures Blood returned; Catheter capped, clamped and heparinized x 2 ports   Machine Disinfection Process Exterior Machine Disinfection; Acid/Vinegar Clean; Heat Disinfect   Rinseback Volume (ml) 300 ml   Total Liters Processed (l/min) 68.2 l/min   Dialyzer Clearance Lightly streaked   Duration of Treatment (minutes) 180 minutes   Heparin amount administered during treatment (units) 0 units   Hemodialysis Intake (ml) 300 ml   Hemodialysis Output (ml) 1400 ml   NET Removed (ml) 1100 ml   Tolerated Treatment Fair   Patient Response to Treatment Pt has back pain throughout treatment, medicated for pain with tylenol 2 tabs , medicated for Nausea with Zofran 4 mg IV. Report to [facility]

## 2021-11-18 NOTE — ED PROVIDER NOTES
HPI:  21, Time: 10:03 PM DANIAL Kwok is a 73 y.o. female presenting to the ED for fever and reportedly not feeling well, beginning 1 day ago. The complaint has been persistent, moderate in severity, and worsened by nothing. Patient reports that she was at work and was not feeling well she was having chills as well as fever. Patient reportedly vomited out in the waiting room. Patient reporting no chest pain or cough. She reports she was vaccinated for Covid. Patient reporting no abdominal pain she does report some flank pain she does report some urinary symptoms. Patient reporting no sore throat. She reports no neck pain. ROS:   Pertinent positives and negatives are stated within HPI, all other systems reviewed and are negative.  --------------------------------------------- PAST HISTORY ---------------------------------------------  Past Medical History:  has a past medical history of Brain aneurysm, Diabetes mellitus ([address] H/O heart artery stent, Hyperlipidemia, and Hypertension. Past Surgical History:  has a past surgical history that includes Cardiac surgery and  section. Social History:  reports that she has quit smoking. She has never used smokeless tobacco. She reports that she does not drink alcohol and does not use drugs. Family History: family history is not on file. The patients home medications have been reviewed. Allergies: Morphine, Other, and Pcn [penicillins]    ---------------------------------------------------PHYSICAL EXAM--------------------------------------    Constitutional/General: Alert and oriented x3, ill-appearing  Head: Normocephalic and atraumatic  Eyes: PERRL, EOMI  Mouth: Oropharynx clear, handling secretions, no trismus  Neck: Supple, full ROM, non tender to palpation in the midline, no stridor, no crepitus, no meningeal signs  Pulmonary: Lungs clear to auscultation bilaterally, no wheezes, rales, or rhonchi.  Not in respiratory distress  Cardiovascular:  Regular rate. Regular rhythm. No murmurs, gallops, or rubs. 2+ distal pulses  Chest: no chest wall tenderness  Abdomen: Soft. Non tender tenderness to right flank region. Non distended. +BS. No rebound, guarding, or rigidity. No pulsatile masses appreciated. Musculoskeletal: Moves all extremities x 4. Warm and well perfused, no clubbing, cyanosis, or edema. Capillary refill <3 seconds  Skin: warm and dry. No rashes. Neurologic: GCS 15, CN 2-12 grossly intact, no focal deficits, symmetric strength 5/5 in the upper and lower extremities bilaterally  Psych: Normal Affect    -------------------------------------------------- RESULTS -------------------------------------------------  I have personally reviewed all laboratory and imaging results for this patient. Results are listed below.      LABS:  Results for orders placed or performed during the hospital encounter of 11/18/21   COVID-19, Rapid    Specimen: Nasopharyngeal Swab   Result Value Ref Range    SARS-CoV-2, NAAT Not Detected Not Detected   CBC Auto Differential   Result Value Ref Range    WBC 15.0 (H) 4.5 - 11.5 E9/L    RBC 3.36 (L) 3.50 - 5.50 E12/L    Hemoglobin 11.0 (L) 11.5 - 15.5 g/dL    Hematocrit 33.3 (L) 34.0 - 48.0 %    MCV 99.1 80.0 - 99.9 fL    MCH 32.7 26.0 - 35.0 pg    MCHC 33.0 32.0 - 34.5 %    RDW 13.5 11.5 - 15.0 fL    Platelets 543 (L) 546 - 450 E9/L    MPV 10.6 7.0 - 12.0 fL    Neutrophils % 89.0 (H) 43.0 - 80.0 %    Immature Granulocytes % 0.6 0.0 - 5.0 %    Lymphocytes % 5.1 (L) 20.0 - 42.0 %    Monocytes % 4.1 2.0 - 12.0 %    Eosinophils % 1.0 0.0 - 6.0 %    Basophils % 0.2 0.0 - 2.0 %    Neutrophils Absolute 13.34 (H) 1.80 - 7.30 E9/L    Immature Granulocytes # 0.09 E9/L    Lymphocytes Absolute 0.76 (L) 1.50 - 4.00 E9/L    Monocytes Absolute 0.62 0.10 - 0.95 E9/L    Eosinophils Absolute 0.15 0.05 - 0.50 E9/L    Basophils Absolute 0.03 0.00 - 0.20 E9/L   Comprehensive Metabolic Panel   Result Value Ref Range Sodium 140 132 - 146 mmol/L    Potassium 5.1 (H) 3.5 - 5.0 mmol/L    Chloride 103 98 - 107 mmol/L    CO2 21 (L) 22 - 29 mmol/L    Anion Gap 16 7 - 16 mmol/L    Glucose 216 (H) 74 - 99 mg/dL    BUN 55 (H) 6 - 23 mg/dL    CREATININE 5.0 (H) 0.5 - 1.0 mg/dL    GFR Non-African American 9 >=60 mL/min/1.73    GFR African American 10     Calcium 8.6 8.6 - 10.2 mg/dL    Total Protein 7.5 6.4 - 8.3 g/dL    Albumin 3.9 3.5 - 5.2 g/dL    Total Bilirubin 0.5 0.0 - 1.2 mg/dL    Alkaline Phosphatase 135 (H) 35 - 104 U/L    ALT 14 0 - 32 U/L    AST 18 0 - 31 U/L   Lipase   Result Value Ref Range    Lipase 55 13 - 60 U/L   Lactic Acid, Plasma   Result Value Ref Range    Lactic Acid 1.6 0.5 - 2.2 mmol/L   Urinalysis   Result Value Ref Range    Color, UA Yellow Straw/Yellow    Clarity, UA Clear Clear    Glucose, Ur 500 (A) Negative mg/dL    Bilirubin Urine Negative Negative    Ketones, Urine TRACE (A) Negative mg/dL    Specific Gravity, UA 1.020 1.005 - 1.030    Blood, Urine SMALL (A) Negative    pH, UA 6.0 5.0 - 9.0    Protein, UA >=300 (A) Negative mg/dL    Urobilinogen, Urine 0.2 <2.0 E.U./dL    Nitrite, Urine Negative Negative    Leukocyte Esterase, Urine Negative Negative   Microscopic Urinalysis   Result Value Ref Range    Fine Casts, UA 3-5 (A) 0 - 2 /LPF    WBC, UA 1-3 0 - 5 /HPF    RBC, UA 0-1 0 - 2 /HPF    Epithelial Cells, UA FEW /HPF    Bacteria, UA FEW (A) None Seen /HPF   EKG 12 Lead   Result Value Ref Range    Ventricular Rate 106 BPM    Atrial Rate 106 BPM    P-R Interval 148 ms    QRS Duration 84 ms    Q-T Interval 358 ms    QTc Calculation (Bazett) 475 ms    P Axis 29 degrees    R Axis 2 degrees    T Axis 56 degrees       RADIOLOGY:  Interpreted by Radiologist.  CT ABDOMEN PELVIS WO CONTRAST Additional Contrast? None   Final Result   1. There is no nephrolithiasis, hydronephrosis, hydroureter or other   evidence for acute obstructive uropathy. 2. Probable cholelithiasis. 3. Mild diverticulosis.   No acute diverticulitis seen. 4. Moderate-sized fat containing umbilical hernia. XR CHEST PORTABLE   Final Result   Mild cardiomegaly and suspect pulmonary vascular congestion. EKG:  This EKG is signed and interpreted by me. Rate: 106  Rhythm: Sinus tachycardia  Interpretation: non-specific EKG  Comparison: no previous EKG available        ------------------------- NURSING NOTES AND VITALS REVIEWED ---------------------------   The nursing notes within the ED encounter and vital signs as below have been reviewed by myself. /87   Pulse 92   Temp 100.1 °F (37.8 °C) (Oral)   Resp 19   Wt 240 lb (108.9 kg)   SpO2 94%   BMI 41.20 kg/m²   Oxygen Saturation Interpretation: Abnormal    The patients available past medical records and past encounters were reviewed. ------------------------------ ED COURSE/MEDICAL DECISION MAKING----------------------  Medications   0.9 % sodium chloride bolus (1,000 mLs IntraVENous New Bag 11/18/21 0304)   ondansetron (ZOFRAN) injection 4 mg (4 mg IntraVENous Given 11/18/21 0304)   acetaminophen (TYLENOL) tablet 1,000 mg (1,000 mg Oral Given 11/17/21 2158)   promethazine (PHENERGAN) injection 12.5 mg (12.5 mg IntraMUSCular Given 11/17/21 1630)             Medical Decision Making:   Patient presenting here for history of fever and chills. Started today. Patient reporting nausea vomiting. Patient reports she is on dialysis. Patient reporting no chest pain no difficulty breathing or cough patient reporting no abdominal pain or neck pain she does report some mild headache she reports no sore throat. Patient has dialysis Tuesday Thursday Saturday. Patient reporting no rash. Labs noted reviewed patient does have fever here. Her white count is 15,000. Labs as well as EKG and CT noted reviewed. Patient still febrile here. She was given fluids she was given Tylenol. She was also given antiemetic here.   Patient will be admitted for further evaluation and treatment. Re-Evaluations:             Re-evaluation. Patients symptoms show no change  Patient given IV fluids as well as Zofran. Patient was given Phenergan out in the waiting room. Patient still nauseated. Patient on recheck still having chills. Patient reporting no chest pain or difficulty breathing. Patient made aware of findings and plan for admission. Consultations:               Internal medicine  Critical Care: This patient's ED course included: a personal history and physicial eaxmination    This patient has been closely monitored during their ED course. Counseling: The emergency provider has spoken with the patient and discussed todays results, in addition to providing specific details for the plan of care and counseling regarding the diagnosis and prognosis. Questions are answered at this time and they are agreeable with the plan.       --------------------------------- IMPRESSION AND DISPOSITION ---------------------------------    IMPRESSION  1. Fever, unspecified        DISPOSITION  Disposition: Admit to telemetry  Patient condition is fair        NOTE: This report was transcribed using voice recognition software.  Every effort was made to ensure accuracy; however, inadvertent computerized transcription errors may be present          Randal Palomares MD  11/17/21 299 Michael Weiner MD  11/18/21 0386

## 2021-11-18 NOTE — PROGRESS NOTES
Pharmacy Consultation Note  (Antibiotic Dosing and Monitoring)    Initial consult date: 11/18/21  Consulting physician/provider: Dr. Joaquní Moise  Drug: Vancomycin  Indication: Gram-positive bacteremia     Age/  Gender Height Weight IBW  Allergy Information   66 y.o./female 5' 4\" (162.6 cm) 240 lb (108.9 kg)     Ideal body weight: 54.7 kg (120 lb 9.5 oz)  Adjusted ideal body weight: 76.4 kg (168 lb 5.7 oz)   Morphine, Other, and Pcn [penicillins]      Renal Function:  Recent Labs     11/17/21  1638   BUN 55*   CREATININE 5.0*     No intake or output data in the 24 hours ending 11/18/21 1215    Vancomycin Monitoring:  Trough:  No results for input(s): VANCOTROUGH in the last 72 hours. Random:  No results for input(s): VANCORANDOM in the last 72 hours. Vancomycin Administration Times:  Recent vancomycin administrations      No vancomycin IV orders with administrations found. Assessment:  · Patient is a 73 y.o. female who has been initiated on vancomycin  · Estimated Creatinine Clearance: 14 mL/min (A) (based on SCr of 5 mg/dL (H)). · To dose vancomycin, pharmacy will be utilizing dosing based off of levels due to patient requiring hemodialysis   · Tue, Thur, Sat HD starting 11/20.     Plan:  · Give vancomycin 1500 mg IV x 1 dose  · Will check vancomycin levels when appropriate  · Will continue to monitor renal function   · Clinical pharmacy to follow      Susanna Moore [facility] 11/18/2021 12:15 PM

## 2021-11-19 ENCOUNTER — ANESTHESIA (OUTPATIENT)
Dept: OPERATING ROOM | Age: 66
End: 2021-11-19

## 2021-11-19 ENCOUNTER — ANESTHESIA EVENT (OUTPATIENT)
Dept: OPERATING ROOM | Age: 66
End: 2021-11-19

## 2021-11-19 PROBLEM — T80.211A CLABSI (CENTRAL LINE-ASSOCIATED BLOODSTREAM INFECTION): Status: ACTIVE | Noted: 2021-11-19

## 2021-11-19 PROBLEM — Z99.2 ESRD ON HEMODIALYSIS (HCC): Status: ACTIVE | Noted: 2021-11-19

## 2021-11-19 PROBLEM — N18.6 ESRD ON HEMODIALYSIS (HCC): Status: ACTIVE | Noted: 2021-11-19

## 2021-11-19 LAB
ALBUMIN SERPL-MCNC: 3.5 G/DL (ref 3.5–5.2)
ALP BLD-CCNC: 91 U/L (ref 35–104)
ALT SERPL-CCNC: 21 U/L (ref 0–32)
ANION GAP SERPL CALCULATED.3IONS-SCNC: 21 MMOL/L (ref 7–16)
AST SERPL-CCNC: 30 U/L (ref 0–31)
BASOPHILS ABSOLUTE: 0.03 E9/L (ref 0–0.2)
BASOPHILS RELATIVE PERCENT: 0.2 % (ref 0–2)
BILIRUB SERPL-MCNC: 0.5 MG/DL (ref 0–1.2)
BUN BLDV-MCNC: 37 MG/DL (ref 6–23)
CALCIUM SERPL-MCNC: 7.8 MG/DL (ref 8.6–10.2)
CHLORIDE BLD-SCNC: 93 MMOL/L (ref 98–107)
CO2: 21 MMOL/L (ref 22–29)
CREAT SERPL-MCNC: 4.2 MG/DL (ref 0.5–1)
EOSINOPHILS ABSOLUTE: 0 E9/L (ref 0.05–0.5)
EOSINOPHILS RELATIVE PERCENT: 0 % (ref 0–6)
GFR AFRICAN AMERICAN: 13
GFR NON-AFRICAN AMERICAN: 11 ML/MIN/1.73
GLUCOSE BLD-MCNC: 305 MG/DL (ref 74–99)
HBV SURFACE AB TITR SER: NORMAL {TITER}
HCT VFR BLD CALC: 29 % (ref 34–48)
HEMOGLOBIN: 9.2 G/DL (ref 11.5–15.5)
HEPATITIS B SURFACE ANTIGEN INTERPRETATION: NORMAL
IMMATURE GRANULOCYTES #: 0.12 E9/L
IMMATURE GRANULOCYTES %: 0.9 % (ref 0–5)
LYMPHOCYTES ABSOLUTE: 0.65 E9/L (ref 1.5–4)
LYMPHOCYTES RELATIVE PERCENT: 4.9 % (ref 20–42)
MCH RBC QN AUTO: 31.8 PG (ref 26–35)
MCHC RBC AUTO-ENTMCNC: 31.7 % (ref 32–34.5)
MCV RBC AUTO: 100.3 FL (ref 80–99.9)
METER GLUCOSE: 196 MG/DL (ref 74–99)
METER GLUCOSE: 217 MG/DL (ref 74–99)
METER GLUCOSE: 292 MG/DL (ref 74–99)
METER GLUCOSE: 308 MG/DL (ref 74–99)
MONOCYTES ABSOLUTE: 1.05 E9/L (ref 0.1–0.95)
MONOCYTES RELATIVE PERCENT: 7.9 % (ref 2–12)
NEUTROPHILS ABSOLUTE: 11.4 E9/L (ref 1.8–7.3)
NEUTROPHILS RELATIVE PERCENT: 86.1 % (ref 43–80)
PDW BLD-RTO: 13.8 FL (ref 11.5–15)
PLATELET # BLD: 108 E9/L (ref 130–450)
PMV BLD AUTO: 11.1 FL (ref 7–12)
POTASSIUM REFLEX MAGNESIUM: 4.6 MMOL/L (ref 3.5–5)
RBC # BLD: 2.89 E12/L (ref 3.5–5.5)
SODIUM BLD-SCNC: 135 MMOL/L (ref 132–146)
TOTAL PROTEIN: 6.5 G/DL (ref 6.4–8.3)
WBC # BLD: 13.3 E9/L (ref 4.5–11.5)

## 2021-11-19 PROCEDURE — 87186 SC STD MICRODIL/AGAR DIL: CPT

## 2021-11-19 PROCEDURE — 82962 GLUCOSE BLOOD TEST: CPT

## 2021-11-19 PROCEDURE — 6360000002 HC RX W HCPCS: Performed by: FAMILY MEDICINE

## 2021-11-19 PROCEDURE — 87070 CULTURE OTHR SPECIMN AEROBIC: CPT

## 2021-11-19 PROCEDURE — 6360000002 HC RX W HCPCS: Performed by: SPECIALIST

## 2021-11-19 PROCEDURE — 36415 COLL VENOUS BLD VENIPUNCTURE: CPT

## 2021-11-19 PROCEDURE — 80053 COMPREHEN METABOLIC PANEL: CPT

## 2021-11-19 PROCEDURE — 6370000000 HC RX 637 (ALT 250 FOR IP): Performed by: FAMILY MEDICINE

## 2021-11-19 PROCEDURE — 1200000000 HC SEMI PRIVATE

## 2021-11-19 PROCEDURE — 87040 BLOOD CULTURE FOR BACTERIA: CPT

## 2021-11-19 PROCEDURE — 6370000000 HC RX 637 (ALT 250 FOR IP): Performed by: INTERNAL MEDICINE

## 2021-11-19 PROCEDURE — 2580000003 HC RX 258: Performed by: FAMILY MEDICINE

## 2021-11-19 PROCEDURE — 99231 SBSQ HOSP IP/OBS SF/LOW 25: CPT | Performed by: SURGERY

## 2021-11-19 PROCEDURE — 85025 COMPLETE CBC W/AUTO DIFF WBC: CPT

## 2021-11-19 PROCEDURE — 87077 CULTURE AEROBIC IDENTIFY: CPT

## 2021-11-19 PROCEDURE — 2700000000 HC OXYGEN THERAPY PER DAY

## 2021-11-19 RX ORDER — CYCLOBENZAPRINE HCL 10 MG
5 TABLET ORAL 3 TIMES DAILY PRN
Status: DISCONTINUED | OUTPATIENT
Start: 2021-11-19 | End: 2021-11-26 | Stop reason: HOSPADM

## 2021-11-19 RX ORDER — HYDROCODONE BITARTRATE AND ACETAMINOPHEN 5; 325 MG/1; MG/1
1 TABLET ORAL EVERY 6 HOURS PRN
Status: DISCONTINUED | OUTPATIENT
Start: 2021-11-19 | End: 2021-11-26 | Stop reason: HOSPADM

## 2021-11-19 RX ADMIN — ONDANSETRON 4 MG: 2 INJECTION INTRAMUSCULAR; INTRAVENOUS at 17:12

## 2021-11-19 RX ADMIN — CLOPIDOGREL 75 MG: 75 TABLET, FILM COATED ORAL at 08:30

## 2021-11-19 RX ADMIN — INSULIN LISPRO 3 UNITS: 100 INJECTION, SOLUTION INTRAVENOUS; SUBCUTANEOUS at 17:07

## 2021-11-19 RX ADMIN — PROMETHAZINE HYDROCHLORIDE 12.5 MG: 25 TABLET ORAL at 07:01

## 2021-11-19 RX ADMIN — Medication 2000 MG: at 14:55

## 2021-11-19 RX ADMIN — CYCLOBENZAPRINE 5 MG: 10 TABLET, FILM COATED ORAL at 10:09

## 2021-11-19 RX ADMIN — TRIMETHOBENZAMIDE HYDROCHLORIDE 200 MG: 100 INJECTION INTRAMUSCULAR at 08:23

## 2021-11-19 RX ADMIN — HEPARIN SODIUM 5000 UNITS: 10000 INJECTION INTRAVENOUS; SUBCUTANEOUS at 20:57

## 2021-11-19 RX ADMIN — INSULIN GLARGINE 20 UNITS: 100 INJECTION, SOLUTION SUBCUTANEOUS at 08:22

## 2021-11-19 RX ADMIN — ACETAMINOPHEN 650 MG: 325 TABLET ORAL at 02:49

## 2021-11-19 RX ADMIN — ONDANSETRON 4 MG: 2 INJECTION INTRAMUSCULAR; INTRAVENOUS at 00:47

## 2021-11-19 RX ADMIN — ATORVASTATIN CALCIUM 40 MG: 40 TABLET, FILM COATED ORAL at 08:23

## 2021-11-19 RX ADMIN — HYDROCODONE BITARTRATE AND ACETAMINOPHEN 1 TABLET: 5; 325 TABLET ORAL at 05:05

## 2021-11-19 RX ADMIN — Medication 10 ML: at 08:29

## 2021-11-19 RX ADMIN — INSULIN LISPRO 3 UNITS: 100 INJECTION, SOLUTION INTRAVENOUS; SUBCUTANEOUS at 06:37

## 2021-11-19 RX ADMIN — HEPARIN SODIUM 5000 UNITS: 10000 INJECTION INTRAVENOUS; SUBCUTANEOUS at 05:05

## 2021-11-19 RX ADMIN — Medication 10 ML: at 20:57

## 2021-11-19 RX ADMIN — HEPARIN SODIUM 5000 UNITS: 10000 INJECTION INTRAVENOUS; SUBCUTANEOUS at 14:54

## 2021-11-19 RX ADMIN — ASPIRIN 81 MG: 81 TABLET, COATED ORAL at 08:23

## 2021-11-19 RX ADMIN — INSULIN LISPRO 3 UNITS: 100 INJECTION, SOLUTION INTRAVENOUS; SUBCUTANEOUS at 12:42

## 2021-11-19 RX ADMIN — CARVEDILOL 12.5 MG: 6.25 TABLET, FILM COATED ORAL at 08:23

## 2021-11-19 ASSESSMENT — PAIN SCALES - GENERAL
PAINLEVEL_OUTOF10: 4
PAINLEVEL_OUTOF10: 7
PAINLEVEL_OUTOF10: 3
PAINLEVEL_OUTOF10: 8
PAINLEVEL_OUTOF10: 9
PAINLEVEL_OUTOF10: 8

## 2021-11-19 ASSESSMENT — PAIN DESCRIPTION - PAIN TYPE: TYPE: CHRONIC PAIN

## 2021-11-19 ASSESSMENT — ENCOUNTER SYMPTOMS: DYSPNEA ACTIVITY LEVEL: AFTER AMBULATING 1 FLIGHT OF STAIRS

## 2021-11-19 NOTE — CONSULTS
[address] [place] [address] [facility]    Consultation Note     Admit Date: 2021  2:36 AM    Reason for Consult:   Healthcare associated bloodstream infection with MSSA    Attending Physician:  Ivet Diggs MD     Chief Complaint: Nausea, fever    HISTORY OF PRESENT ILLNESS:   The patient is a 73 y.o.  woman known known to the Infectious Diseases service. The patient is admitted through the emergency room with fevers chills. Patient is end-stage renal disease dialysis twice a week tunnel catheter. Symptoms: Chills/generally not feeling well chills positive fever. Complains of some vague abdominal pain. Patient is vaccinated against Covid. Work-up in emergency room revealed that she had positive blood cultures and PCR platform associated MSSA. Labs showed a BUN and creatinine 35 and 5.0 admitting white count was 15 hemoglobin  Urine culture had 1-3 white cells per high-power. CT abdomen pelvis in the emergency room was not diagnostic for acute issues however noted includes diverticulosis hernia umbilical cholelithiasis  She was started on vancomycin.   Catheter was removed last night          Past Medical History:        Diagnosis Date    Brain aneurysm     Diabetes mellitus ([address]     H/O heart artery stent     Hyperlipidemia     Hypertension      Past Surgical History:        Procedure Laterality Date    CARDIAC SURGERY       SECTION       Current Medications:   Scheduled Meds:   vancomycin (VANCOCIN) intermittent dosing (placeholder)   Other RX Placeholder    aspirin  81 mg Oral Daily    atorvastatin  40 mg Oral Daily    carvedilol  12.5 mg Oral BID WC    clopidogrel  75 mg Oral Daily    insulin lispro  3 Units SubCUTAneous TID AC    insulin glargine  20 Units SubCUTAneous Daily    sodium chloride flush  10 mL IntraVENous 2 times per day    heparin (porcine)  5,000 Units SubCUTAneous 3 times per day     Continuous Infusions:   sodium chloride      sodium chloride 75 mL/hr at 11/18/21 0645    dextrose       PRN Meds:HYDROcodone-acetaminophen, cyclobenzaprine, sodium chloride flush, sodium chloride, promethazine **OR** ondansetron, polyethylene glycol, acetaminophen **OR** acetaminophen, glucose, dextrose, glucagon (rDNA), dextrose, trimethobenzamide    Allergies:  Morphine, Other, and Pcn [penicillins]    Social History:   Social History     Socioeconomic History    Marital status:      Spouse name: None    Number of children: None    Years of education: None    Highest education level: None   Occupational History    None   Tobacco Use    Smoking status: Former Smoker    Smokeless tobacco: Never Used   Substance and Sexual Activity    Alcohol use: Never    Drug use: Never    Sexual activity: None   Other Topics Concern    None   Social History Narrative    None     Social Determinants of Health     Financial Resource Strain:     Difficulty of Paying Living Expenses: Not on file   Food Insecurity:     Worried About Running Out of Food in the Last Year: Not on file    Gagan of Food in the Last Year: Not on file   Transportation Needs:     Lack of Transportation (Medical): Not on file    Lack of Transportation (Non-Medical):  Not on file   Physical Activity:     Days of Exercise per Week: Not on file    Minutes of Exercise per Session: Not on file   Stress:     Feeling of Stress : Not on file   Social Connections:     Frequency of Communication with Friends and Family: Not on file    Frequency of Social Gatherings with Friends and Family: Not on file    Attends Spiritism Services: Not on file    Active Member of Clubs or Organizations: Not on file    Attends Club or Organization Meetings: Not on file    Marital Status: Not on file   Intimate Partner Violence:     Fear of Current or Ex-Partner: Not on file    Emotionally Abused: Not on file    Physically Abused: Not on file    Sexually Abused: Not on file   Housing Stability:  Unable to Pay for Housing in the Last Year: Not on file    Number of Places Lived in the Last Year: Not on file    Unstable Housing in the Last Year: Not on file     Family History:   History reviewed. No pertinent family history. . Otherwise non-pertinent to the chief complaint. REVIEW OF SYSTEMS:    CONSTITUTIONAL: Positive chills, positive fevers or night sweats. No loss of weight. EYES:  No double vision or drainage from eyes, ears or throat. HEENT:  No neck stiffness. No dysphagia. No drainage from eyes, ears or throat  RESPIRATORY:  No cough, productive sputum or hemoptysis. CARDIOVASCULAR:  No chest pain, palpitations, orthopnea or dyspnea on exertion. GASTROINTESTINAL: Positive nausea, vomiting, negative diarrhea or constipation or hematochezia   GENITOURINARY:  No frequency burning dysuria or hematuria. INTEGUMENT/BREAST:  No rash or breast masses. HEMATOLOGIC/LYMPHATIC:  No lymphadenopathy or blood dyscrasics. ALLERGIC/IMMUNOLOGIC:  No anaphylaxis. ENDOCRINE:  No polyuria or polydipsia or temperature intolerance. MUSCULOSKELETAL:  No myalgia or arthralgia. Full ROM. NEUROLOGICAL:  No focal motor sensory deficit. BEHAVIOR/PSYCH:  No psychosis. PHYSICAL EXAM:    Vitals:    /64   Pulse 68   Temp 98.4 °F (36.9 °C) (Oral)   Resp 18   Ht 5' 4\" (1.626 m)   Wt 250 lb 3.6 oz (113.5 kg)   SpO2 93%   BMI 42.95 kg/m²   Constitutional: The patient is awake, alert, and oriented. Skin: Warm and dry. No rashes were noted. No jaundice. HEENT: Eyes show round, and reactive pupils. Moist mucous membranes, no ulcerations, no thrush. Neck: Supple to movements. No lymphadenopathy. Chest: No use of accessory muscles to breathe. Symmetrical expansion. Auscultation reveals no wheezing, crackles, or rhonchi. Cardiovascular: S1 and S2 are rhythmic and regular. No murmurs appreciated. Abdomen: Positive bowel sounds to auscultation. Benign to palpation. No masses felt.  No hepatosplenomegaly. Genitourinary: Female  Extremities: No clubbing, no cyanosis, positive lower extremities edema. Musculoskeletal: Equal and symmetrical  Neurological: No focal  Lines: peripheral      CBC+dif:  Recent Labs     11/17/21  1638 11/17/21  1638 11/19/21  0454   WBC 15.0*  --  13.3*   HGB 11.0*   < > 9.2*   HCT 33.3*   < > 29.0*   MCV 99.1   < > 100.3*   *   < > 108*   NEUTROABS 13.34*   < > 11.40*    < > = values in this interval not displayed. No results found for: CRP  No results found for: CRPHS  No results found for: SEDRATE  Lab Results   Component Value Date    ALT 21 11/19/2021    AST 30 11/19/2021    ALKPHOS 91 11/19/2021    BILITOT 0.5 11/19/2021     Lab Results   Component Value Date     11/19/2021    K 4.6 11/19/2021    CL 93 11/19/2021    CO2 21 11/19/2021    BUN 37 11/19/2021    CREATININE 4.2 11/19/2021    GFRAA 13 11/19/2021    LABGLOM 11 11/19/2021    GLUCOSE 305 11/19/2021    PROT 6.5 11/19/2021    LABALBU 3.5 11/19/2021    CALCIUM 7.8 11/19/2021    BILITOT 0.5 11/19/2021    ALKPHOS 91 11/19/2021    AST 30 11/19/2021    ALT 21 11/19/2021       Lab Results   Component Value Date    PROTIME 13.5 11/18/2021    INR 1.2 11/18/2021       No results found for: TSH    Lab Results   Component Value Date    COLORU Yellow 11/18/2021    PHUR 6.0 11/18/2021    WBCUA 1-3 11/18/2021    RBCUA 0-1 11/18/2021    BACTERIA FEW 11/18/2021    CLARITYU Clear 11/18/2021    SPECGRAV 1.020 11/18/2021    LEUKOCYTESUR Negative 11/18/2021    UROBILINOGEN 0.2 11/18/2021    BILIRUBINUR Negative 11/18/2021    BLOODU SMALL 11/18/2021    GLUCOSEU 500 11/18/2021       No results found for: LEK7JQA, BEART, F6PFOWNJ, PHART, THGBART, OKG6VHR, PO2ART, OSI4YFW  Radiology:  CT ABDOMEN PELVIS WO CONTRAST Additional Contrast? None   Final Result   1. There is no nephrolithiasis, hydronephrosis, hydroureter or other   evidence for acute obstructive uropathy. 2. Probable cholelithiasis.    3. Mild diverticulosis. No acute diverticulitis seen. 4. Moderate-sized fat containing umbilical hernia. XR CHEST PORTABLE   Final Result   Mild cardiomegaly and suspect pulmonary vascular congestion. Microbiology:  Pending  Recent Labs     11/17/21 2215   BC Gram stain performed from blood culture bottle media  Gram positive cocci in clusters  *  Sensitivity to follow     Recent Labs     11/17/21 2215   ORG Staphylococcus aureus*     Recent Labs     11/18/21  0319   BLOODCULT2 Gram stain performed from blood culture bottle media  Gram positive cocci in clusters  Previously positive blood culture called  *     No results for input(s): STREPNEUMAGU in the last 72 hours. No results for input(s): LP1UAG in the last 72 hours. No results for input(s): ASO in the last 72 hours. No results for input(s): CULTRESP in the last 72 hours. Assessment:  · Healthcare associated bloodstream infection-MSSA    Plan:    · Cont Ancef 2-2-3  · check cultures  · Baseline ESR, CRP  · Monitor labs  · Will follow with you    Thank you for having us see this patient in consultation. I will be discussing this case with the treating physicians.       Electronically signed by Marcie Tay MD on 11/19/2021 at 1:06 PM

## 2021-11-19 NOTE — PROGRESS NOTES
Hospitalist Progress Note      SYNOPSIS: Patient admitted on 2021 for <principal problem not specified>      SUBJECTIVE:    Patient seen and examined  Records reviewed. Feels better today, vomiting improved. No chest pain no abdominal pain. T-max 101.3    Stable overnight. No other overnight issues reported. Temp (24hrs), Av.4 °F (37.4 °C), Min:97.9 °F (36.6 °C), Max:101.3 °F (38.5 °C)    DIET: ADULT DIET; Clear Liquid  CODE: Full Code    Intake/Output Summary (Last 24 hours) at 2021 1057  Last data filed at 2021 0609  Gross per 24 hour   Intake 1347.79 ml   Output 1400 ml   Net -52.21 ml       OBJECTIVE:    /64   Pulse 68   Temp 98.4 °F (36.9 °C) (Oral)   Resp 18   Ht 5' 4\" (1.626 m)   Wt 250 lb 3.6 oz (113.5 kg)   SpO2 93%   BMI 42.95 kg/m²     General appearance: No apparent distress, appears stated age and cooperative. HEENT:  Conjunctivae/corneas clear. Neck: Supple. No jugular venous distention. Respiratory: Clear to auscultation bilaterally, normal respiratory effort  Cardiovascular: Regular rate rhythm, normal S1-S2  Abdomen: Soft, nontender, nondistended  Musculoskeletal: No clubbing, cyanosis, no bilateral lower extremity edema. Skin:  No rashes  on visible skin  Neurologic: awake, alert and following commands     Assessment and plan:  Ms. Gayathri Hernandez, a 73y.o. year old female  who  has a past medical history of Brain aneurysm, Diabetes mellitus ([address] H/O heart artery stent, Hyperlipidemia, and Hypertension.      Patient presents to the emergency department with nausea vomiting and fever. This began suddenly yesterday. Patient is a end-stage renal disease hemodialysis patient. Multiple episodes of emesis. Patient denies chest pain or cough, urinary symptoms. Patient is vaccinated for COVID-19. Vital signs reveal the patient to have a temperature of 100.7 remainder of her vital signs are within normal and stable.   Operatory studies notable for potassium 5.1, BUN 55, creatinine 5.0, glucose 216, alk phos 135, WBC 15.0. COVID-19 negative. CT abdomen pelvis is unremarkable. Chest x-ray unremarkable. Patient given IV fluids and antiemetics with some relief of her symptoms. Blood cultures were obtained. Medicine consulted for admission. 1.  Bacteremia with staph species: Continue IV vancomycin, infectious disease consultation. Vascular surgery consulted, removed right IJ tunneled catheter 11/18/2021, plan to replace with note of catheter 12/20/2021.    2.  End-stage renal disease on hemodialysis: Continue renal replacement therapy, nephrology following. 3.  Nausea and vomiting: Likely associated with underlying infection, improved, continue supportive care. 4.  Hypotension: Likely associated with underlying infection, improved    5. Diabetes type 2 with hyperglycemia: Continue insulin sliding scale    6.   Hyperlipidemia: Continue statin    Disposition: Pending clinical progression    Medications:  REVIEWED DAILY    Infusion Medications    sodium chloride      sodium chloride 75 mL/hr at 11/18/21 0645    dextrose       Scheduled Medications    vancomycin (VANCOCIN) intermittent dosing (placeholder)   Other RX Placeholder    aspirin  81 mg Oral Daily    atorvastatin  40 mg Oral Daily    carvedilol  12.5 mg Oral BID WC    clopidogrel  75 mg Oral Daily    insulin lispro  3 Units SubCUTAneous TID AC    insulin glargine  20 Units SubCUTAneous Daily    sodium chloride flush  10 mL IntraVENous 2 times per day    heparin (porcine)  5,000 Units SubCUTAneous 3 times per day     PRN Meds: HYDROcodone-acetaminophen, cyclobenzaprine, sodium chloride flush, sodium chloride, promethazine **OR** ondansetron, polyethylene glycol, acetaminophen **OR** acetaminophen, glucose, dextrose, glucagon (rDNA), dextrose, trimethobenzamide    Labs:     Recent Labs     11/17/21  1638 11/19/21  0454   WBC 15.0* 13.3*   HGB 11.0* 9.2*   HCT 33.3* 29.0*   PLT 116* 108*       Recent Labs     11/17/21  1638 11/19/21  0454    135   K 5.1* 4.6    93*   CO2 21* 21*   BUN 55* 37*   CREATININE 5.0* 4.2*   CALCIUM 8.6 7.8*       Recent Labs     11/17/21  1638 11/19/21  0454   PROT 7.5 6.5   ALKPHOS 135* 91   ALT 14 21   AST 18 30   BILITOT 0.5 0.5   LIPASE 55  --        Recent Labs     11/18/21 2058   INR 1.2       No results for input(s): Adonica Hoose in the last 72 hours. Chronic labs:    Lab Results   Component Value Date    INR 1.2 11/18/2021       Radiology: REVIEWED DAILY    +++++++++++++++++++++++++++++++++++++++++++++++++  Brian Whittaker MD  [employer] - [address] [place], [place]  +++++++++++++++++++++++++++++++++++++++++++++++++  NOTE: This report was transcribed using voice recognition software. Every effort was made to ensure accuracy; however, inadvertent computerized transcription errors may be present.

## 2021-11-19 NOTE — PROGRESS NOTES
[facility] MD Rabia Nelson MD Francie Dan, MD. Forrest Mota MD   Progress Note    2021    SUBJECTIVE:   Seen in her room she is feeling better since the cath was removed   On RA . Alert oriented , cooperative   PROBLEM LIST:    Active Problems:    Nausea & vomiting    Bacteremia  Resolved Problems:    * No resolved hospital problems. *       DIET:    ADULT DIET; Clear Liquid       Allergies : Morphine, Other, and Pcn [penicillins]    Past Medical History:   Diagnosis Date    Brain aneurysm     Diabetes mellitus ([address]     H/O heart artery stent     Hyperlipidemia     Hypertension        Past Surgical History:   Procedure Laterality Date    CARDIAC SURGERY       SECTION         History reviewed. No pertinent family history. reports that she has quit smoking. She has never used smokeless tobacco. She reports that she does not drink alcohol and does not use drugs. Review of Systems:   Constitutional: no fevers , no chills , feels ok   Eyes: no eye pain , no itching , no drainage  Ears, nose, mouth, throat, and face: no ear ,nose pain , hearing is ok ,no nasal drainage   Respiratory: no sob ,no cough ,no wheezing . Cardiovascular: no chest pain , no palpitation ,no sob . Gastrointestinal: no nausea, vomiting , constipation , no abdominal pain . Genitourinary:no urinary retention , no burning , dysuria . No polyuria   Hematologic/lymphatic: no bleeding , no cougulation issues . Musculoskeletal:no joint pain , no swelling . Neurological: no headaches ,no weakness , no numbness . Endocrine: no thirst , no weight issues .      MEDS (scheduled):    vancomycin (VANCOCIN) intermittent dosing (placeholder)   Other RX Placeholder    aspirin  81 mg Oral Daily    atorvastatin  40 mg Oral Daily    carvedilol  12.5 mg Oral BID WC    clopidogrel  75 mg Oral Daily    insulin lispro  3 Units SubCUTAneous TID AC    insulin glargine  20 Units SubCUTAneous Daily    sodium chloride flush  10 mL IntraVENous 2 times per day    heparin (porcine)  5,000 Units SubCUTAneous 3 times per day       MEDS (infusions):   sodium chloride      sodium chloride 75 mL/hr at 11/18/21 0645    dextrose         MEDS (prn):   HYDROcodone-acetaminophen, cyclobenzaprine, sodium chloride flush, sodium chloride, promethazine **OR** ondansetron, polyethylene glycol, acetaminophen **OR** acetaminophen, glucose, dextrose, glucagon (rDNA), dextrose, trimethobenzamide    PHYSICAL EXAM:     Patient Vitals for the past 24 hrs:   BP Temp Temp src Pulse Resp SpO2 Weight   11/19/21 0815 138/64 98.4 °F (36.9 °C) Oral 68 18 -- --   11/18/21 2302 119/74 100.1 °F (37.8 °C) Oral 87 20 -- --   11/18/21 1935 91/60 101.3 °F (38.5 °C) Oral 86 20 93 % --   11/18/21 1642 (!) 167/83 99.4 °F (37.4 °C) -- 113 20 -- 250 lb 3.6 oz (113.5 kg)   11/18/21 1630 116/85 -- -- 110 -- -- --   11/18/21 1600 (!) 153/56 -- -- 123 -- -- --   11/18/21 1530 (!) 174/35 -- -- 111 -- -- --   11/18/21 1500 (!) 161/38 -- -- 100 -- -- --   11/18/21 1433 139/68 -- -- 76 -- -- --   11/18/21 1404 105/61 -- -- 105 -- -- --   11/18/21 1341 136/64 -- -- 76 -- -- --   11/18/21 1336 137/73 97.9 °F (36.6 °C) -- 74 -- -- 252 lb 10.4 oz (114.6 kg)   @      Intake/Output Summary (Last 24 hours) at 11/19/2021 1121  Last data filed at 11/19/2021 9531  Gross per 24 hour   Intake 1347.79 ml   Output 1400 ml   Net -52.21 ml         Wt Readings from Last 3 Encounters:   11/18/21 250 lb 3.6 oz (113.5 kg)   09/04/21 240 lb 1.3 oz (108.9 kg)       Constitutional:  in no acute distress  Oral: mucus membranes moist  Neck: no JVD  Cardiovascular: S1, S2 regular rhythm, no murmur,or rub  Respiratory:  No crackles, no wheeze  Gastrointestinal:  Soft, nontender, nondistended, NABS  Ext: no edema, feet warm  Skin: dry, no rash  Neuro: awake, alert, interactive      DATA:    Recent Labs     11/17/21  1638 11/19/21  0454   WBC 15.0* 13.3*   HGB 11.0* 9.2*   HCT 33.3* 29.0*   MCV 99.1 100.3*   * 108*     Recent Labs     11/17/21  1638 11/19/21  0454    135   K 5.1* 4.6    93*   CO2 21* 21*   BUN 55* 37*   CREATININE 5.0* 4.2*   ALT 14 21   AST 18 30   BILITOT 0.5 0.5   ALKPHOS 135* 91       No results found for: LABPROT    ASSESSMENT / RECOMMENDATIONS:      1.  End-stage renal disease, on hemodialysis twice weekly. 2.  Gram-positive cocci bacteremia. 3.  Anemia of chronic disease. 4.  Hypertension. Plan :    Garth removed on 11/18 . She did get a dose of vancomycin post dialysis   ID to see today . Will defer PENELOPE , course and length of Abx to them   Repeat Blood cultures today .    D/w Dr Chris Phillips     Electronically signed by Rosie Hare MD on 11/19/2021 at 11:21 AM

## 2021-11-19 NOTE — CONSULTS
[address]                  [address] [facility]  [facility]                                  CONSULTATION    PATIENT NAME: Nathaly Veras                     :        1955  MED REC NO:   [id number]                            ROOM:       8409  ACCOUNT NO:   [de-identified]                           ADMIT DATE: 2021  PROVIDER:     Raelene Heimlich, MD    CONSULT DATE:  2021    REASON FOR CONSULTATION:  End-stage renal disease. HISTORY OF PRESENT ILLNESS:  The patient is a 43-year-old female I am  being asked to see for end-stage renal disease. The patient is  presenting to the hospital with complaints of nausea, fevers, and  chills. The patient is on dialysis twice weekly via right IJ tunneled catheter  and she is telling me she has been on dialysis since 2021. The patient follows up with Dr. Riky Braun as an outpatient regarding  dialysis purposes. I did see her in dialysis treatment, she is alert, she is oriented, she  continued to be chilly. Blood cultures are positive for gram-positive  cocci in clusters. REVIEW OF SYSTEMS:  12-point done, negative except for those mentioned  above. ALLERGIES:  INCLUDE MORPHINE, PENICILLIN. PAST MEDICAL HISTORY:  Includes end-stage renal disease, diabetes,  hyperlipidemia, hypertension. PAST SURGICAL HISTORY:  Includes tunneled line placement, heart surgery,  . SOCIAL HISTORY:  No smoking. No drinking. FAMILY HISTORY:  Noncontributory. MEDICATIONS:  Include the followin. Aspirin. 2.  Lipitor. 3.  Carvedilol. 4.  Plavix. PHYSICAL EXAMINATION:  VITAL SIGNS:  Blood pressure is 116/85, heart rate is 110. GENERAL:  She is in mild distress. HEAD:  Atraumatic and normocephalic. NECK:  Supple, symmetrical.  EYES:  Pupils are round and reactive to light bilaterally. HEART:  Tachycardic. ABDOMEN:  Soft, nontender. Bowel sounds active.   No organomegaly. LUNGS:  Good airflow bilaterally. SKIN:  Dry. PSYCHIATRIC:  Cooperative. BLOOD WORK:  Hemoglobin 11, sodium 140, CO2 21, creatinine 5, calcium  8.6, albumin 3.9. ASSESSMENT:  List of problems:  1. End-stage renal disease, on hemodialysis twice weekly. 2.  Gram-positive cocci bacteremia. 3.  Anemia of chronic disease. 4.  Hypotension. PLAN:  1. Hemodialysis today per orders. 2.  Give a dose of vancomycin post dialysis, discussed with hemodialysis  staff and charge nurse. 3.  Counseled Vascular Surgery for Tesio line removal.  4.  Continue other aspects of kidney management. Thank you for involving us in taking care of the patient.         Juhi Mann MD    D: 11/18/2021 18:29:08       T: 11/18/2021 19:40:31     [email or web address]  Job#: [id number]     Doc#: [id number]    CC:

## 2021-11-19 NOTE — CONSULTS
Vascular Surgery Consultation Note    Reason for Consult:  Removal of tunneled line    HPI :    This is a 73 y.o. female history of end-stage renal disease and heart catheterization who is admitted to the hospital for treatment of nausea, vomiting, fevers, chills, and aches thought to be secondary to staphylococcus bacteremia. Vascular surgery is consulted for evaluation and treatment of removal of tunneled dialysis catheter for source control. Patient undergoes dialysis Tuesday Thursday/Saturday. He uses a right internal jugular tunneled dialysis catheter. She most recently received dialysis this morning. She states that her dialysis they noticed some purulence from the catheter site. She had this tunneled catheter placed in March at [address]. There is no record of this. She underwent brachiocephalic AV fistula creation in [facility] this [facility] with Dr. Jonathan Edwards. In September the fistula was evaluated and flow was determined to be around 400, so the fistula was not mautre and subsequently has not been used. Said that she was also told that it is too deep to access. [place] and has not yet established care with a vascular surgeon. States that she is on aspirin, Plavix, and Coumadin.     ROS : Negative if blank [], Positive if [x]  General Vascular   [x] Fevers [] Claudication (Blocks)   [x] Chills [] Rest Pain   [] Weight Loss [] Tissue Loss   [] Chest Pain [] Clotting Disorder    [] SOB at rest [x] Leg Swelling   [] SOB with exertion [] DVT/PE      [x] Nausea    [x] Vomitting [] Stroke/TIA   [] Abdominal Pain [] Focal weakness   [] Melena [] Slurred Speech   [] Hematochezia [] Vision Changes   [] Hematuria    [] Dysuria [x] Hx of Central Catheters   [] Wears Glasses/Contacts  [x] Dialysis    [] Blindness     []  Hand Dominant   [] Difficulty swallowing        Past Medical History:   Diagnosis Date    Brain aneurysm     Diabetes mellitus ([facility] [address]     H/O heart artery stent Hyperlipidemia     Hypertension         Past Surgical History:   Procedure Laterality Date    CARDIAC SURGERY       SECTION       Current Medications:    sodium chloride      sodium chloride 75 mL/hr at 21 0645    dextrose        sodium chloride flush, sodium chloride, promethazine **OR** ondansetron, polyethylene glycol, acetaminophen **OR** acetaminophen, glucose, dextrose, glucagon (rDNA), dextrose, trimethobenzamide    aspirin  81 mg Oral Daily    atorvastatin  40 mg Oral Daily    carvedilol  12.5 mg Oral BID WC    clopidogrel  75 mg Oral Daily    insulin lispro  3 Units SubCUTAneous TID AC    insulin glargine  20 Units SubCUTAneous Daily    sodium chloride flush  10 mL IntraVENous 2 times per day    heparin (porcine)  5,000 Units SubCUTAneous 3 times per day    vancomycin  20 mg/kg (Adjusted) IntraVENous Once        Allergies:  Morphine, Other, and Pcn [penicillins]    Social History     Socioeconomic History    Marital status:      Spouse name: Not on file    Number of children: Not on file    Years of education: Not on file    Highest education level: Not on file   Occupational History    Not on file   Tobacco Use    Smoking status: Former Smoker    Smokeless tobacco: Never Used   Substance and Sexual Activity    Alcohol use: Never    Drug use: Never    Sexual activity: Not on file   Other Topics Concern    Not on file   Social History Narrative    Not on file     Social Determinants of Health     Financial Resource Strain:     Difficulty of Paying Living Expenses: Not on file   Food Insecurity:     Worried About [address] in the Last Year: Not on file    Ran Out of Food in the Last Year: Not on file   Transportation Needs:     Lack of Transportation (Medical): Not on file    Lack of Transportation (Non-Medical):  Not on file   Physical Activity:     Days of Exercise per Week: Not on file    Minutes of Exercise per Session: Not on file   Stress:     Feeling of Stress : Not on L posterior tibial 2   R dorsalis pedis 2 L dorsalis pedis 2     LABS:    Lab Results   Component Value Date    WBC 15.0 (H) 11/17/2021    HGB 11.0 (L) 11/17/2021    HCT 33.3 (L) 11/17/2021     (L) 11/17/2021    K 5.1 (H) 11/17/2021    BUN 55 (H) 11/17/2021    CREATININE 5.0 (H) 11/17/2021       RADIOLOGY:  CT ABDOMEN PELVIS WO CONTRAST Additional Contrast? None    Result Date: 11/18/2021  EXAMINATION: CT OF THE ABDOMEN AND PELVIS WITHOUT CONTRAST 11/17/2021 11:56 pm TECHNIQUE: CT of the abdomen and pelvis was performed without the administration of intravenous contrast. Multiplanar reformatted images are provided for review. Dose modulation, iterative reconstruction, and/or weight based adjustment of the mA/kV was utilized to reduce the radiation dose to as low as reasonably achievable. COMPARISON: None. HISTORY: ORDERING SYSTEM PROVIDED HISTORY: Flank pain fever TECHNOLOGIST PROVIDED HISTORY: Reason for exam:->Flank pain fever Additional Contrast?->None Decision Support Exception - unselect if not a suspected or confirmed emergency medical condition->Emergency Medical Condition (MA) What reading provider will be dictating this exam?->CRC FINDINGS: Lower Chest: The visualized portions of the lung bases are clear. Organs: Within the limitations of a noncontrast exam, the visualized liver, spleen, pancreas, adrenal glands and kidneys demonstrate no significant abnormality. There is no nephrolithiasis, hydronephrosis, hydroureter or other evidence for acute obstructive uropathy. GI/Bowel: There are no findings of intestinal obstruction. The appendix is not visualized. There is probable cholelithiasis there is mild diverticulosis involving descending colon. There is no acute diverticulitis seen. Pelvis:  Bladder is unremarkable in appearance. There is no abnormal pelvic mass or fluid collection seen. Peritoneum/Retroperitoneum: There is no abdominal aortic aneurysm.   There is no abnormal lymphadenopathy seen.  There is no abnormal fluid collection. There is no free intraperitoneal air. Bones/Soft Tissues: There is a moderate-sized fat containing umbilical hernia. There is no acute abnormality seen involving visualized osseous structures. 1.  There is no nephrolithiasis, hydronephrosis, hydroureter or other evidence for acute obstructive uropathy. 2. Probable cholelithiasis. 3. Mild diverticulosis. No acute diverticulitis seen. 4. Moderate-sized fat containing umbilical hernia. Assesment/Plan  Joann Drake is a 21-year-old female with Staphylococcus bacteremia and concern for right internal jugular tunneled dialysis catheter as potential source. Plan to remove tunneled line at bedside. Will check INR before removal.  Because patient last received dialysis this morning, no need for immediate temporary dialysis line placement. Will monitor electrolytes tomorrow morning. If electrolytes are normal, can consider inserting temporary line. Plan for tunneled central line placement in the operating room sometime in the upcoming days. Patient findings and plan discussed with Dr. Eugene Aguillon. Gita Harris MD    Pt seen and examined    Bacteremia    Tunn hd cath removed at bedside  Will plan onf temp catheter insertion on Saturday orning    Once cleared by id will place new tunn catheter likely tuesd or Wednesday    Pt has L UE bc avf which is patent but to deep for use - will arrange for outpatient revision in future    I reviewed the procedure with the patient and family as available. I discussed the procedure, risks, benefits, complications, and alternatives of the procedure. They understand and consent.   All questions were answered    Danie Vasquez MD

## 2021-11-19 NOTE — PROGRESS NOTES
 Vancomycin has been discontinued   [address] to [address] Physician to re-consult pharmacy if future dosing is needed    Thank you for the consult,  Juany Hernandez. Juan F Hatfield, PharmD 11/19/2021 1:17 PM       __________________________________________________________________    Pharmacy Consultation Note  (Antibiotic Dosing and Monitoring)    Initial consult date: 11/18/21  Consulting physician/provider: Dr. Carliss Soulier  Drug: Vancomycin  Indication: Gram-positive bacteremia     Age/  Gender Height Weight IBW  Allergy Information   66 y.o./female 5' 4\" (162.6 cm) 240 lb (108.9 kg)     Ideal body weight: 54.7 kg (120 lb 9.5 oz)  Adjusted ideal body weight: 78.2 kg (172 lb 7.1 oz)   Morphine, Other, and Pcn [penicillins]      Renal Function:  Recent Labs     11/17/21  1638 11/19/21  0454   BUN 55* 37*   CREATININE 5.0* 4.2*       Intake/Output Summary (Last 24 hours) at 11/19/2021 0742  Last data filed at 11/19/2021 9212  Gross per 24 hour   Intake 1347.79 ml   Output 1400 ml   Net -52.21 ml       Vancomycin Monitoring:  Trough:  No results for input(s): VANCOTROUGH in the last 72 hours. Random:  No results for input(s): VANCORANDOM in the last 72 hours. Date  WBC CrCl Drug/Dose Time   Given Level(s)   (Time) Comments   11/18 15.0  HD x 3 hrs Vancomycin 1500 mg IV x1 2040 11/19 13.3 ESRD No vancomycin -     11/20  HD   Random <0600>               Assessment:  · Patient is a 73 y.o. female who has been initiated on vancomycin  Estimated Creatinine Clearance: 16 mL/min (A) (based on SCr of 4.2 mg/dL (H)). · To dose vancomycin, pharmacy will be utilizing dosing based off of levels due to patient requiring hemodialysis   · [place], Sat HD. Plan:  · No dialysis today, no vancomycin today  · Will check vancomycin level tomorrow AM, prior to HD.   · Will continue to monitor renal function   · Clinical pharmacy to follow      Lázaro Dorado, [address] [facility] 11/19/2021 7:42 AM

## 2021-11-19 NOTE — PLAN OF CARE
Problem: Falls - Risk of:  Goal: Will remain free from falls  Description: Will remain free from falls  11/19/2021 0418 by Valdene Shone, RN  Outcome: Met This Shift  11/19/2021 0417 by Valdene Shone, RN  Outcome: Met This Shift  Goal: Absence of physical injury  Description: Absence of physical injury  11/19/2021 0418 by Valdene Shone, RN  Outcome: Met This Shift  11/19/2021 0417 by Valdene Shone, RN  Outcome: Met This Shift

## 2021-11-19 NOTE — PROGRESS NOTES
Vascular Surgery Progress Note    Pt is being seen in f/u today regarding     Subjective  Pt s/e. Pain well controlled. Right IJ tunneled line removed at bedside last evening    Current Medications:    sodium chloride      sodium chloride 75 mL/hr at 11/18/21 0645    dextrose        HYDROcodone-acetaminophen, sodium chloride flush, sodium chloride, promethazine **OR** ondansetron, polyethylene glycol, acetaminophen **OR** acetaminophen, glucose, dextrose, glucagon (rDNA), dextrose, trimethobenzamide    aspirin  81 mg Oral Daily    atorvastatin  40 mg Oral Daily    carvedilol  12.5 mg Oral BID WC    clopidogrel  75 mg Oral Daily    insulin lispro  3 Units SubCUTAneous TID AC    insulin glargine  20 Units SubCUTAneous Daily    sodium chloride flush  10 mL IntraVENous 2 times per day    heparin (porcine)  5,000 Units SubCUTAneous 3 times per day        PHYSICAL EXAM:    /74   Pulse 87   Temp 100.1 °F (37.8 °C) (Oral)   Resp 20   Ht 5' 4\" (1.626 m)   Wt 250 lb 3.6 oz (113.5 kg)   SpO2 93%   BMI 42.95 kg/m²     Intake/Output Summary (Last 24 hours) at 11/19/2021 2710  Last data filed at 11/19/2021 3923  Gross per 24 hour   Intake 1347.79 ml   Output 1400 ml   Net -52.21 ml          Gen: awake, alert and oriented x3, no apparent distress  CVS: RRR  Neck: Right IJ tunneled line site with minimal purulence draining  Resp: No increased work of breathing  Abd: Soft, non-tender, non-distended  R LE: warm, sensation intact  L LE: warm, sensation intact    LABS:    Lab Results   Component Value Date    WBC 13.3 (H) 11/19/2021    HGB 9.2 (L) 11/19/2021    HCT 29.0 (L) 11/19/2021     (L) 11/19/2021    PROTIME 13.5 (H) 11/18/2021    INR 1.2 11/18/2021    K 4.6 11/19/2021    BUN 37 (H) 11/19/2021    CREATININE 4.2 (H) 11/19/2021       A/P  73 y.o. female with staph bacteremia.   Vascular surgery consulted for removal of tunneled right internal jugular line removal.  Line was removed at bedside and tube was sent for culture 11/18/2021    Appreciate infectious disease assistance  We will plan on temporary hemodialysis catheter placement at the bedside Saturday 11/20 to allow for 2 days of line holiday      Vilma Montgomery MD

## 2021-11-19 NOTE — ANESTHESIA PRE PROCEDURE
Department of Anesthesiology  Preprocedure Note       Name:  Cecilio Sims   Age:  73 y.o.  :  1955                                          MRN:  [id number]         Date:  2021      Surgeon: Arturo Schaefer):  Wen Berkowitz MD    Procedure: Procedure(s):  REMOVAL TUNNELED DIALYSIS CATHETER , INSERTION TEMPORARY   HEMODIALYSIS CATHETER    Medications prior to admission:   Prior to Admission medications    Medication Sig Start Date End Date Taking?  Authorizing Provider   atorvastatin (LIPITOR) 40 MG tablet Take 40 mg by mouth daily   Yes Historical Provider, MD   carvedilol (COREG) 12.5 MG tablet Take 12.5 mg by mouth 2 times daily (with meals)   Yes Historical Provider, MD   clopidogrel (PLAVIX) 75 MG tablet Take 75 mg by mouth daily   Yes Historical Provider, MD   aspirin 81 MG EC tablet Take 81 mg by mouth daily   Yes Historical Provider, MD   Insulin Glargine, 2 Unit Dial, (TOUJEO MAX SOLOSTAR) 300 UNIT/ML SOPN Inject 25 Units into the skin daily   Yes Historical Provider, MD   insulin aspart (NOVOLOG) 100 UNIT/ML injection vial Inject 3 Units into the skin 3 times daily (before meals)   Yes Historical Provider, MD       Current medications:    Current Facility-Administered Medications   Medication Dose Route Frequency Provider Last Rate Last Admin    HYDROcodone-acetaminophen (NORCO) 5-325 MG per tablet 1 tablet  1 tablet Oral Q6H PRN Kierra Nuñez, DO   1 tablet at 21 0505    aspirin EC tablet 81 mg  81 mg Oral Daily Kierra Nuñez, DO   81 mg at 21 0847    atorvastatin (LIPITOR) tablet 40 mg  40 mg Oral Daily Kierra Nuñez, DO   40 mg at 21 0847    carvedilol (COREG) tablet 12.5 mg  12.5 mg Oral BID WC Kierra Nuñez, DO   12.5 mg at 21 0847    clopidogrel (PLAVIX) tablet 75 mg  75 mg Oral Daily Kierra Nuñez, DO   75 mg at 21 0847    insulin lispro (HUMALOG) injection vial 3 Units  3 Units SubCUTAneous TID [facility] Kierra Nuñez, DO   3 Units at 21 5454    insulin glargine (LANTUS) injection vial 20 Units  20 Units SubCUTAneous Daily Chyrel Diego, DO   20 Units at 11/18/21 0847    sodium chloride flush 0.9 % injection 10 mL  10 mL IntraVENous 2 times per day Chyrel Diego, DO   10 mL at 11/18/21 1019    sodium chloride flush 0.9 % injection 10 mL  10 mL IntraVENous PRN Chyrel Diego, DO        0.9 % sodium chloride infusion  25 mL IntraVENous PRN Chyrel Diego, DO        heparin (porcine) injection 5,000 Units  5,000 Units SubCUTAneous 3 times per day Chyrel Diego, DO   5,000 Units at 11/19/21 0505    promethazine (PHENERGAN) tablet 12.5 mg  12.5 mg Oral Q6H PRN Chyrel Diego, DO        Or    ondansetron [facility]) injection 4 mg  4 mg IntraVENous Q6H PRN Chyrel Diego, DO   4 mg at 11/19/21 0047    polyethylene glycol (GLYCOLAX) packet 17 g  17 g Oral Daily PRN Chyrel Diego, DO        acetaminophen (TYLENOL) tablet 650 mg  650 mg Oral Q6H PRN Chyrel Diego, DO   650 mg at 11/19/21 0249    Or    acetaminophen (TYLENOL) suppository 650 mg  650 mg Rectal Q6H PRN Chyrel Diego, DO        0.9 % sodium chloride infusion   IntraVENous Continuous Chyrel Diego, DO 75 mL/hr at 11/18/21 0645 New Bag at 11/18/21 0645    glucose (GLUTOSE) 40 % oral gel 15 g  15 g Oral PRN Chyrel Diego, DO        dextrose 50 % IV solution  12.5 g IntraVENous PRN Chyrel Diego, DO        glucagon (rDNA) injection 1 mg  1 mg IntraMUSCular PRN Chyrel Diego, DO        dextrose 5 % solution  100 mL/hr IntraVENous PRN Chyrel Diego, DO        trimethobenzamide Sebastian Yarbrough) injection 200 mg  200 mg IntraMUSCular Q6H PRN Minoo Reus, DO   200 mg at 11/18/21 2039       Allergies:     Allergies   Allergen Reactions    Morphine     Other      bleach    Pcn [Penicillins]        Problem List:    Patient Active Problem List   Diagnosis Code    Nausea & vomiting R11.2    Bacteremia R78.81       Past Medical History:        Diagnosis Date    Brain aneurysm     Diabetes mellitus ([facility] [address]     H/O heart artery stent     Hyperlipidemia     Hypertension        Past Surgical History:        Procedure Laterality Date    CARDIAC SURGERY       SECTION         Social History:    Social History     Tobacco Use    Smoking status: Former Smoker    Smokeless tobacco: Never Used   Substance Use Topics    Alcohol use: Never                                Counseling given: Not Answered      Vital Signs (Current):   Vitals:    21 1630 21 1642 21 1935 21 2302   BP: 116/85 (!) 167/83 91/60 119/74   Pulse: 110 113 86 87   Resp:   20   Temp:  37.4 °C (99.4 °F) 38.5 °C (101.3 °F) 37.8 °C (100.1 °F)   TempSrc:   Oral Oral   SpO2:   93%    Weight:  250 lb 3.6 oz (113.5 kg)     Height:                                                  BP Readings from Last 3 Encounters:   21 119/74   21 131/78       NPO Status:                                                                                 BMI:   Wt Readings from Last 3 Encounters:   21 250 lb 3.6 oz (113.5 kg)   21 240 lb 1.3 oz (108.9 kg)     Body mass index is 42.95 kg/m². CBC:   Lab Results   Component Value Date    WBC 13.3 2021    RBC 2.89 2021    HGB 9.2 2021    HCT 29.0 2021    .3 2021    RDW 13.8 2021     2021       CMP:   Lab Results   Component Value Date     2021    K 5.1 2021    K 5.2 2021     2021    CO2 21 2021    BUN 55 2021    CREATININE 5.0 2021    GFRAA 10 2021    LABGLOM 9 2021    GLUCOSE 216 2021    PROT 7.5 2021    CALCIUM 8.6 2021    BILITOT 0.5 2021    ALKPHOS 135 2021    AST 18 2021    ALT 14 2021       POC Tests: No results for input(s): POCGLU, POCNA, POCK, POCCL, POCBUN, POCHEMO, POCHCT in the last 72 hours.     Coags:   Lab Results   Component Value Date    PROTIME 13.5 2021    INR 1.2 2021       HCG (If Applicable): No results found for: PREGTESTUR, PREGSERUM, HCG, HCGQUANT     ABGs: No results found for: PHART, PO2ART, IEZ9YVX, VCG6HSI, BEART, O3PVDTNF     Type & Screen (If Applicable):  No results found for: LABABO, LABRH    Drug/Infectious Status (If Applicable):  No results found for: HIV, HEPCAB    COVID-19 Screening (If Applicable):   Lab Results   Component Value Date    COVID19 Not Detected 11/17/2021           Anesthesia Evaluation  Patient summary reviewed and Nursing notes reviewed   history of anesthetic complications (prolonged emergence): PONV. Airway: Mallampati: II  TM distance: >3 FB   Neck ROM: full  Mouth opening: > = 3 FB Dental:          Pulmonary:Negative Pulmonary ROS and normal exam  breath sounds clear to auscultation                             Cardiovascular:  Exercise tolerance: poor (<4 METS),   (+) hypertension: mild, CABG/stent (3 stents placed in 2017):, ZIMMERMAN: after ambulating 1 flight of stairs, hyperlipidemia      ECG reviewed  Rhythm: regular  Rate: normal           Beta Blocker:  Dose within 24 Hrs         Neuro/Psych:                ROS comment: Diagnosed with brain aneurysm  GI/Hepatic/Renal:   (+) renal disease: ESRD, morbid obesity          Endo/Other:    (+) DiabetesType II DM, poorly controlled, using insulin, . Abdominal:   (+) obese,     Abdomen: soft. Vascular: negative vascular ROS. Other Findings:      EKG 11/17/21  Sinus tachycardia with occasional premature ventricular complexes  Moderate voltage criteria for LVH, may be normal variant  Borderline ECG    CHEST XRAY 11/17/21  Mild cardiomegaly and suspect pulmonary vascular congestion     Anesthesia Plan      MAC     ASA 3       Induction: intravenous. MIPS: Postoperative opioids intended and Prophylactic antiemetics administered. Anesthetic plan and risks discussed with patient. Use of blood products discussed with patient whom consented to blood products.    Plan discussed with CRNA and attending.                   Gogo Santizo RN   11/19/2021

## 2021-11-19 NOTE — PROGRESS NOTES
Vascular:    I was called today, for consultation, to consider removal of the tunneled dialysis catheter because of MSSA bacteremia, by ID consultant Dr. Romaine Reaves    When I came to see patient, patient was resting comfortably, on supplemental oxygen, denies any further fever or chills    Patient tells me that originally she came in the hospital of, fever, chills, nausea and not feeling well    On examination, and noted, that the patient does not have a tunneled dialysis catheter    On further questioning, she told me that she was out of it yesterday, somebody came in the last night and took it out, she did not know about it    Explained the patient, for now we will cancel the surgery for tomorrow for removal of the tunneled dialysis catheter    When permitted, by the ID consultants will consider replacement with a new tunneled dialysis catheter, if any interim, patient needs hemodialysis before placement of a tunneled dialysis catheter, patient may need temporary dialysis catheter placement, risks benefits explained    All her questions were answered    I will notify Dr. Chaka Vazquez of the same

## 2021-11-20 LAB
CULTURE, BLOOD 2: ABNORMAL
CULTURE, BLOOD 2: ABNORMAL
METER GLUCOSE: 122 MG/DL (ref 74–99)
METER GLUCOSE: 161 MG/DL (ref 74–99)
METER GLUCOSE: 186 MG/DL (ref 74–99)
ORGANISM: ABNORMAL
ORGANISM: ABNORMAL

## 2021-11-20 PROCEDURE — 1200000000 HC SEMI PRIVATE

## 2021-11-20 PROCEDURE — 6360000002 HC RX W HCPCS: Performed by: FAMILY MEDICINE

## 2021-11-20 PROCEDURE — 5A1D70Z PERFORMANCE OF URINARY FILTRATION, INTERMITTENT, LESS THAN 6 HOURS PER DAY: ICD-10-PCS | Performed by: STUDENT IN AN ORGANIZED HEALTH CARE EDUCATION/TRAINING PROGRAM

## 2021-11-20 PROCEDURE — 6370000000 HC RX 637 (ALT 250 FOR IP): Performed by: FAMILY MEDICINE

## 2021-11-20 PROCEDURE — 82962 GLUCOSE BLOOD TEST: CPT

## 2021-11-20 PROCEDURE — 6370000000 HC RX 637 (ALT 250 FOR IP): Performed by: INTERNAL MEDICINE

## 2021-11-20 PROCEDURE — 90935 HEMODIALYSIS ONE EVALUATION: CPT

## 2021-11-20 PROCEDURE — 2700000000 HC OXYGEN THERAPY PER DAY

## 2021-11-20 PROCEDURE — 36556 INSERT NON-TUNNEL CV CATH: CPT | Performed by: SURGERY

## 2021-11-20 PROCEDURE — 2580000003 HC RX 258: Performed by: FAMILY MEDICINE

## 2021-11-20 RX ORDER — SIMETHICONE 80 MG
80 TABLET,CHEWABLE ORAL EVERY 6 HOURS PRN
Status: DISCONTINUED | OUTPATIENT
Start: 2021-11-20 | End: 2021-11-26 | Stop reason: HOSPADM

## 2021-11-20 RX ORDER — SODIUM CHLORIDE 9 MG/ML
INJECTION, SOLUTION INTRAVENOUS CONTINUOUS
Status: DISCONTINUED | OUTPATIENT
Start: 2021-11-20 | End: 2021-11-20

## 2021-11-20 RX ADMIN — CYCLOBENZAPRINE 5 MG: 10 TABLET, FILM COATED ORAL at 05:47

## 2021-11-20 RX ADMIN — INSULIN GLARGINE 20 UNITS: 100 INJECTION, SOLUTION SUBCUTANEOUS at 09:59

## 2021-11-20 RX ADMIN — INSULIN LISPRO 3 UNITS: 100 INJECTION, SOLUTION INTRAVENOUS; SUBCUTANEOUS at 10:03

## 2021-11-20 RX ADMIN — HEPARIN SODIUM 5000 UNITS: 10000 INJECTION INTRAVENOUS; SUBCUTANEOUS at 04:44

## 2021-11-20 RX ADMIN — SIMETHICONE 80 MG: 80 TABLET, CHEWABLE ORAL at 13:08

## 2021-11-20 RX ADMIN — HYDROCODONE BITARTRATE AND ACETAMINOPHEN 1 TABLET: 5; 325 TABLET ORAL at 04:44

## 2021-11-20 RX ADMIN — CYCLOBENZAPRINE 5 MG: 10 TABLET, FILM COATED ORAL at 21:51

## 2021-11-20 RX ADMIN — ONDANSETRON 4 MG: 2 INJECTION INTRAMUSCULAR; INTRAVENOUS at 04:44

## 2021-11-20 RX ADMIN — ASPIRIN 81 MG: 81 TABLET, COATED ORAL at 13:08

## 2021-11-20 RX ADMIN — CLOPIDOGREL 75 MG: 75 TABLET, FILM COATED ORAL at 13:08

## 2021-11-20 RX ADMIN — HYDROCODONE BITARTRATE AND ACETAMINOPHEN 1 TABLET: 5; 325 TABLET ORAL at 20:07

## 2021-11-20 RX ADMIN — INSULIN LISPRO 3 UNITS: 100 INJECTION, SOLUTION INTRAVENOUS; SUBCUTANEOUS at 13:13

## 2021-11-20 RX ADMIN — CYCLOBENZAPRINE 5 MG: 10 TABLET, FILM COATED ORAL at 16:07

## 2021-11-20 RX ADMIN — HEPARIN SODIUM 5000 UNITS: 10000 INJECTION INTRAVENOUS; SUBCUTANEOUS at 13:12

## 2021-11-20 RX ADMIN — Medication 10 ML: at 22:17

## 2021-11-20 RX ADMIN — ATORVASTATIN CALCIUM 40 MG: 40 TABLET, FILM COATED ORAL at 13:08

## 2021-11-20 ASSESSMENT — ENCOUNTER SYMPTOMS
CHEST TIGHTNESS: 0
SORE THROAT: 0
ABDOMINAL PAIN: 0
NAUSEA: 0
ABDOMINAL DISTENTION: 0
COUGH: 0
BACK PAIN: 1
SHORTNESS OF BREATH: 0
CHOKING: 0
WHEEZING: 0
VOMITING: 0

## 2021-11-20 ASSESSMENT — PAIN SCALES - GENERAL
PAINLEVEL_OUTOF10: 8
PAINLEVEL_OUTOF10: 0
PAINLEVEL_OUTOF10: 0
PAINLEVEL_OUTOF10: 8
PAINLEVEL_OUTOF10: 0
PAINLEVEL_OUTOF10: 0
PAINLEVEL_OUTOF10: 8

## 2021-11-20 NOTE — PROGRESS NOTES
[facility] MD Tristan Weeks MD Deon Cecil, MD. Vance De La Fuente MD   Progress Note    2021    SUBJECTIVE:    :Seen in her room she is feeling better since the cath was removed   On  . Alert oriented , cooperative      : seen in her room , will clarify with vascular and ID when HD line to be placed back   PROBLEM LIST:    Active Problems:    Nausea & vomiting    Bacteremia    CLABSI (central line-associated bloodstream infection)    ESRD on hemodialysis (HCC)    Fever, unspecified  Resolved Problems:    * No resolved hospital problems. *       DIET:    ADULT DIET; Regular; Low Sodium (2 gm); Low Potassium (Less than 3000 mg/day)       Allergies : Morphine, Other, and Pcn [penicillins]    Past Medical History:   Diagnosis Date    Brain aneurysm     CLABSI (central line-associated bloodstream infection) 2021    [facility]     ESRD on hemodialysis ([facility] 2021    H/O heart artery stent     Hyperlipidemia     Hypertension        Past Surgical History:   Procedure Laterality Date    CARDIAC SURGERY       SECTION         History reviewed. No pertinent family history. reports that she has quit smoking. She has never used smokeless tobacco. She reports that she does not drink alcohol and does not use drugs. Review of Systems:   Constitutional: no fevers , no chills , feels ok   Eyes: no eye pain , no itching , no drainage  Ears, nose, mouth, throat, and face: no ear ,nose pain , hearing is ok ,no nasal drainage   Respiratory: no sob ,no cough ,no wheezing . Cardiovascular: no chest pain , no palpitation ,no sob . Gastrointestinal: no nausea, vomiting , constipation , no abdominal pain . Genitourinary:no urinary retention , no burning , dysuria . No polyuria   Hematologic/lymphatic: no bleeding , no cougulation issues . Musculoskeletal:no joint pain , no swelling .    Neurological: no headaches ,no weakness , no numbness . Endocrine: no thirst , no weight issues .      MEDS (scheduled):    ceFAZolin  2,000 mg IntraVENous Q48H    ceFAZolin  2,000 mg IntraVENous Once per day on Tue Thu Sat    ceFAZolin  3,000 mg IntraVENous Once in dialysis    aspirin  81 mg Oral Daily    atorvastatin  40 mg Oral Daily    carvedilol  12.5 mg Oral BID WC    clopidogrel  75 mg Oral Daily    insulin lispro  3 Units SubCUTAneous TID AC    insulin glargine  20 Units SubCUTAneous Daily    sodium chloride flush  10 mL IntraVENous 2 times per day    heparin (porcine)  5,000 Units SubCUTAneous 3 times per day       MEDS (infusions):   sodium chloride      dextrose         MEDS (prn):  simethicone, HYDROcodone-acetaminophen, cyclobenzaprine, sodium chloride flush, sodium chloride, promethazine **OR** ondansetron, polyethylene glycol, acetaminophen **OR** acetaminophen, glucose, dextrose, glucagon (rDNA), dextrose, trimethobenzamide    PHYSICAL EXAM:     Patient Vitals for the past 24 hrs:   BP Temp Temp src Pulse Resp SpO2   11/20/21 0800 124/62 97.7 °F (36.5 °C) Temporal 68 16 97 %   11/20/21 0058 (!) 135/49 97.7 °F (36.5 °C) Temporal 76 16 98 %   11/19/21 1827 (!) 118/34 98.4 °F (36.9 °C) Oral 75 16 96 %   @    No intake or output data in the 24 hours ending 11/20/21 1514      Wt Readings from Last 3 Encounters:   11/18/21 250 lb 3.6 oz (113.5 kg)   09/04/21 240 lb 1.3 oz (108.9 kg)       Constitutional:  in no acute distress  Oral: mucus membranes moist  Neck: no JVD  Cardiovascular: S1, S2 regular rhythm, no murmur,or rub  Respiratory:  No crackles, no wheeze  Gastrointestinal:  Soft, nontender, nondistended, NABS  Ext: no edema, feet warm  Skin: dry, no rash  Neuro: awake, alert, interactive      DATA:    Recent Labs     11/17/21  1638 11/19/21  0454   WBC 15.0* 13.3*   HGB 11.0* 9.2*   HCT 33.3* 29.0*   MCV 99.1 100.3*   * 108*     Recent Labs     11/17/21  1638 11/19/21  0454    135   K 5.1* 4.6    93*   CO2

## 2021-11-20 NOTE — PROGRESS NOTES
[address] [place] [address] [facility]  Progress Note    CC: MSSA bacteremia   Face to face encounter   SUBJECTIVE:  Patient is tolerating medications. No reported adverse drug reactions. ROS: No nausea, vomiting, diarrhea. No fevers. On 5 liters nasal canula. Sitting up in chair- family at bedside. Medications:  Scheduled Meds:   ceFAZolin  2,000 mg IntraVENous Q48H    ceFAZolin  2,000 mg IntraVENous Once per day on Tue Thu Sat    ceFAZolin  3,000 mg IntraVENous Once in dialysis    aspirin  81 mg Oral Daily    atorvastatin  40 mg Oral Daily    carvedilol  12.5 mg Oral BID WC    clopidogrel  75 mg Oral Daily    insulin lispro  3 Units SubCUTAneous TID AC    insulin glargine  20 Units SubCUTAneous Daily    sodium chloride flush  10 mL IntraVENous 2 times per day    heparin (porcine)  5,000 Units SubCUTAneous 3 times per day     Continuous Infusions:   sodium chloride      dextrose       PRN Meds:simethicone, HYDROcodone-acetaminophen, cyclobenzaprine, sodium chloride flush, sodium chloride, promethazine **OR** ondansetron, polyethylene glycol, acetaminophen **OR** acetaminophen, glucose, dextrose, glucagon (rDNA), dextrose, trimethobenzamide  OBJECTIVE:  Patient Vitals for the past 24 hrs:   BP Temp Temp src Pulse Resp SpO2   11/20/21 0800 124/62 97.7 °F (36.5 °C) Temporal 68 16 97 %   11/20/21 0058 (!) 135/49 97.7 °F (36.5 °C) Temporal 76 16 98 %   11/19/21 1827 (!) 118/34 98.4 °F (36.9 °C) Oral 75 16 96 %     Constitutional: The patient is awake, alert, and oriented. Sitting up in bed. Skin: Warm and dry. No rashes were noted. Head: Eyes show round, and reactive pupils. No jaundice. Mouth: Moist mucous membranes, no ulcerations, no thrush. Neck: Supple to movements. No lymphadenopathy. Chest: No use of accessory muscles to breathe. Symmetrical expansion. Auscultation reveals no wheezing, crackles, or rhonchi. Cardiovascular: S1 and S2 are rhythmic and regular.    Abdomen: Positive bowel sounds to auscultation. Extremities: No clubbing, no cyanosis, some edema. PIV    Laboratory and Tests Review:  Lab Results   Component Value Date    WBC 13.3 (H) 11/19/2021    WBC 15.0 (H) 11/17/2021    WBC 8.2 09/04/2021    HGB 9.2 (L) 11/19/2021    HCT 29.0 (L) 11/19/2021    .3 (H) 11/19/2021     (L) 11/19/2021     Lab Results   Component Value Date    NEUTROABS 11.40 (H) 11/19/2021    NEUTROABS 13.34 (H) 11/17/2021    NEUTROABS 5.68 09/04/2021     No results found for: CRP  No results found for: SEDRATE  Lab Results   Component Value Date    ALT 21 11/19/2021    AST 30 11/19/2021    ALKPHOS 91 11/19/2021    BILITOT 0.5 11/19/2021     Lab Results   Component Value Date     11/19/2021    K 4.6 11/19/2021    CL 93 11/19/2021    CO2 21 11/19/2021    BUN 37 11/19/2021    CREATININE 4.2 11/19/2021    GFRAA 13 11/19/2021    LABGLOM 11 11/19/2021    GLUCOSE 305 11/19/2021    PROT 6.5 11/19/2021    LABALBU 3.5 11/19/2021    CALCIUM 7.8 11/19/2021    BILITOT 0.5 11/19/2021    ALKPHOS 91 11/19/2021    AST 30 11/19/2021    ALT 21 11/19/2021     Radiology:  CT  Impression:        1. There is no nephrolithiasis, hydronephrosis, hydroureter or other   evidence for acute obstructive uropathy. 2. Probable cholelithiasis. 3. Mild diverticulosis. No acute diverticulitis seen. 4. Moderate-sized fat containing umbilical hernia     Microbiology:   11/17/2021- blood cx- staph aureus - MSSA  11/18/2021- blood cx- staph aureus   11/19/2021- blood cx- Staph aureus     11/19/2021- tip cx- ++ > 15 colonies staph aureus     ASSESSMENT:  MSSA bacteremia    S/P HD cath removal- CLABSI  Leukocytosis   ESRD- HD     PLAN:  Continue Ancef- 2 grams -2 grams- 3 grams  Had ancef- 11/19/2021    vascular consulted for tunneled cath- cannot not place until blood cultures are negative   Check cultures  ECHO/ PENELOPE ordered   Monitor labs- repeat serial blood cultures ordered   Daughter at bedside. Updated.      Lucinda Calderón TAWNYA Kelley - CNS  12:53 PM  11/20/2021       Pt seen and examined. Above discussed agree with advanced practice nurse. Labs, cultures, and radiographs reviewed. Face to Face encounter occurred. Changes made as necessary.      Albino Vallejo MD

## 2021-11-20 NOTE — PROCEDURES
Benji Arriaza is a 73 y.o. female patient. 1. Fever, unspecified      Past Medical History:   Diagnosis Date    Brain aneurysm     CLABSI (central line-associated bloodstream infection) 11/19/2021    [facility]     ESRD on hemodialysis ([facility] 11/19/2021    H/O heart artery stent     Hyperlipidemia     Hypertension      Blood pressure 124/62, pulse 68, temperature 97.7 °F (36.5 °C), temperature source Temporal, resp. rate 16, height 5' 4\" (1.626 m), weight 250 lb 3.6 oz (113.5 kg), SpO2 97 %. Temporary HD Catheter    Date/Time: 11/20/2021 4:02 PM  Performed by: Manuel Pisano MD  Authorized by: Lynsey Barboza MD   Consent: Verbal consent obtained. Risks and benefits: risks, benefits and alternatives were discussed  Consent given by: patient  Patient identity confirmed: verbally with patient and arm band  Time out: Immediately prior to procedure a \"time out\" was called to verify the correct patient, procedure, equipment, support staff and site/side marked as required.   Indications: vascular access  Anesthesia: local infiltration    Anesthesia:  Local Anesthetic: lidocaine 1% with epinephrine    Sedation:  Patient sedated: no    Preparation: skin prepped with 2% chlorhexidine  Skin prep agent dried: skin prep agent completely dried prior to procedure  Sterile barriers: all five maximum sterile barriers used - cap, mask, sterile gown, sterile gloves, and large sterile sheet  Hand hygiene: hand hygiene performed prior to central venous catheter insertion  Location details: right femoral  Patient position: flat  Catheter type: double lumen  Catheter size: 14 Fr  Ultrasound guidance: yes  Sterile ultrasound techniques: sterile gel and sterile probe covers were used  Number of attempts: 1  Successful placement: yes  Post-procedure: line sutured and dressing applied  Assessment: blood return through all ports and free fluid flow  Patient tolerance: patient tolerated the procedure well with no immediate complications        Dr. Lashawn Nascimento was immediately available for the entirety of the procedure.      Tracie Oleary MD  11/20/2021

## 2021-11-20 NOTE — FLOWSHEET NOTE
11/20/21 1833   Vital Signs   BP (!) 158/60   Temp 98.2 °F (36.8 °C)   Pulse 71   Resp 18   Weight 255 lb 11.7 oz (116 kg)   Weight Method Bed scale   Percent Weight Change -1.19   Pain Assessment   Pain Assessment 0-10   Pain Level 0   Post-Hemodialysis Assessment   Post-Treatment Procedures Blood returned; Catheter capped, clamped with Citrate x 2 ports   Machine Disinfection Process Acid/Vinegar Clean; Exterior Machine Disinfection; Heat Disinfect   Rinseback Volume (ml) 300 ml   Total Liters Processed (l/min) 35 l/min   Dialyzer Clearance Heavily streaked   Duration of Treatment (minutes) 120 minutes   Hemodialysis Intake (ml) 300 ml   Hemodialysis Output (ml) 1800 ml   NET Removed (ml) 1500 ml   Tolerated Treatment Good   Patient Response to Treatment Tolerated tx well. Lines reversed.   Femoral line positional.   Bilateral Breath Sounds Diminished   Edema Generalized

## 2021-11-20 NOTE — PROGRESS NOTES
INTERNAL MEDICINE PROGRESS NOTE          CHIEF COMPLAINT    Chief Complaint   Patient presents with    Nausea     started this am     Fever     103 at home      Eloisa [address] course  73 y. o. year old [de-identified]  has a past medical history of Brain aneurysm, Diabetes mellitus , H/O heart artery stent, Hyperlipidemia, and Hypertension.  Patient presents to the emergency department with nausea vomiting and fever.  Patient is a end-stage renal disease hemodialysis patient.  Multiple episodes of emesis.   Patient is vaccinated for COVID-19. WBC 15. 0. COVID-19 negative.  CT abdomen pelvis is unremarkable.  Chest x-ray unremarkable.    Blood cultures were obtained which came back positive for staph aureus bacteremia infectious disease following nephrology consulted as well vascular surgery was consulted for hemodialysis catheter removal    Bacteremia secondary to staph aureus   Blood cultures collected 11/18+ for staph Aureus   Repeat  Blood cultures collected on 11/19+ for staph Aureus   Infectious disease following continue patient on Ancef   Dialysis catheter removed   Nephrology following as well   Last session of hemodialysis 11/18    Sepsis secondary to bacteremia  Lactic Acid   Date Value Ref Range Status   11/17/2021 1.6 0.5 - 2.2 mmol/L Final   Blood cultures positive for staph aureus  Continue patient on IV antibiotic infectious disease follow      End-stage renal disease on hemodialysis   Nephrology following   Dialysis catheter removed secondary to bacterial   Careful use of IV fluids and dialysis patient to avoid fluid overload    Diabetes mellitus type 2   Accu-Cheks AC at bedtime   Continue patient on sliding scale insulin and Lantus    Anemia of chronic kidney disease  Hemoglobin   Date Value Ref Range Status   11/19/2021 9.2 (L) 11.5 - 15.5 g/dL Final   11/17/2021 11.0 (L) 11.5 - 15.5 g/dL Final   09/04/2021 12.4 11.5 - 15.5 g/dL Final   · Continue to monitor H&H  · Monitor for signs of bleeding      11/20/2021, 11:55 AM  Alireza Alan MD  [facility] [place] Physician - [address] [place], [place]    Subjective         Today, She is seen and Examined at Bedside today. Patient is A & O x 3. Case discussed with the nurse  Pertinent positives mentioned in review of system. All labs and imaging was reviewed, and appropriate steps were taken     Review of Systems   Constitutional: Negative for activity change, chills, diaphoresis and fatigue. HENT: Negative for congestion and sore throat. Eyes: Negative for visual disturbance. Respiratory: Negative for cough, choking, chest tightness, shortness of breath and wheezing. Cardiovascular: Negative for chest pain and leg swelling. Gastrointestinal: Negative for abdominal distention, abdominal pain, nausea and vomiting. Musculoskeletal: Positive for back pain. Skin: Negative for rash. Neurological: Negative for dizziness, syncope, weakness, numbness and headaches. Psychiatric/Behavioral: Negative for behavioral problems. Objective    PHYSICAL EXAMINATION:  Blood pressure 124/62, pulse 68, temperature 97.7 °F (36.5 °C), temperature source Temporal, resp. rate 16, height 5' 4\" (1.626 m), weight 250 lb 3.6 oz (113.5 kg), SpO2 97 %. Physical Exam  HENT:      Head: Normocephalic. Eyes:      Pupils: Pupils are equal, round, and reactive to light. Cardiovascular:      Rate and Rhythm: Normal rate. Heart sounds: No murmur heard. Pulmonary:      Effort: Pulmonary effort is normal.      Breath sounds: Normal breath sounds. Abdominal:      General: Bowel sounds are normal. There is distension. Palpations: Abdomen is soft. Tenderness: There is no abdominal tenderness. Musculoskeletal:         General: No swelling. Cervical back: Neck supple. Right lower leg: Edema present. Left lower leg: Edema present.    Skin:     Findings: No lesion

## 2021-11-20 NOTE — PLAN OF CARE
Problem: Falls - Risk of:  Goal: Will remain free from falls  Description: Will remain free from falls  Outcome: Met This Shift  Goal: Absence of physical injury  Description: Absence of physical injury  Outcome: Met This Shift     Problem: Skin Integrity:  Goal: Will show no infection signs and symptoms  Description: Will show no infection signs and symptoms  Outcome: Met This Shift     Problem: Pain:  Goal: Pain level will decrease  Description: Pain level will decrease  Outcome: Met This Shift  Goal: Control of acute pain  Description: Control of acute pain  Outcome: Met This Shift  Goal: Control of chronic pain  Description: Control of chronic pain  Outcome: Met This Shift

## 2021-11-21 LAB
ANION GAP SERPL CALCULATED.3IONS-SCNC: 19 MMOL/L (ref 7–16)
BASOPHILS ABSOLUTE: 0.04 E9/L (ref 0–0.2)
BASOPHILS RELATIVE PERCENT: 0.3 % (ref 0–2)
BLOOD CULTURE, ROUTINE: ABNORMAL
BUN BLDV-MCNC: 48 MG/DL (ref 6–23)
CALCIUM SERPL-MCNC: 7.5 MG/DL (ref 8.6–10.2)
CHLORIDE BLD-SCNC: 94 MMOL/L (ref 98–107)
CO2: 21 MMOL/L (ref 22–29)
CREAT SERPL-MCNC: 5.2 MG/DL (ref 0.5–1)
CULTURE CATHETER TIP: ABNORMAL
EOSINOPHILS ABSOLUTE: 0.07 E9/L (ref 0.05–0.5)
EOSINOPHILS RELATIVE PERCENT: 0.6 % (ref 0–6)
GFR AFRICAN AMERICAN: 10
GFR NON-AFRICAN AMERICAN: 8 ML/MIN/1.73
GLUCOSE BLD-MCNC: 80 MG/DL (ref 74–99)
HCT VFR BLD CALC: 27.9 % (ref 34–48)
HEMOGLOBIN: 9.1 G/DL (ref 11.5–15.5)
IMMATURE GRANULOCYTES #: 0.06 E9/L
IMMATURE GRANULOCYTES %: 0.5 % (ref 0–5)
LYMPHOCYTES ABSOLUTE: 1.1 E9/L (ref 1.5–4)
LYMPHOCYTES RELATIVE PERCENT: 9.2 % (ref 20–42)
MCH RBC QN AUTO: 32.7 PG (ref 26–35)
MCHC RBC AUTO-ENTMCNC: 32.6 % (ref 32–34.5)
MCV RBC AUTO: 100.4 FL (ref 80–99.9)
METER GLUCOSE: 144 MG/DL (ref 74–99)
METER GLUCOSE: 146 MG/DL (ref 74–99)
METER GLUCOSE: 180 MG/DL (ref 74–99)
METER GLUCOSE: 328 MG/DL (ref 74–99)
METER GLUCOSE: 89 MG/DL (ref 74–99)
MONOCYTES ABSOLUTE: 1.33 E9/L (ref 0.1–0.95)
MONOCYTES RELATIVE PERCENT: 11.1 % (ref 2–12)
NEUTROPHILS ABSOLUTE: 9.39 E9/L (ref 1.8–7.3)
NEUTROPHILS RELATIVE PERCENT: 78.3 % (ref 43–80)
ORGANISM: ABNORMAL
PDW BLD-RTO: 13.3 FL (ref 11.5–15)
PLATELET # BLD: 146 E9/L (ref 130–450)
PMV BLD AUTO: 11.8 FL (ref 7–12)
POTASSIUM SERPL-SCNC: 3.9 MMOL/L (ref 3.5–5)
RBC # BLD: 2.78 E12/L (ref 3.5–5.5)
SEDIMENTATION RATE, ERYTHROCYTE: 103 MM/HR (ref 0–20)
SODIUM BLD-SCNC: 134 MMOL/L (ref 132–146)
WBC # BLD: 12 E9/L (ref 4.5–11.5)

## 2021-11-21 PROCEDURE — 85651 RBC SED RATE NONAUTOMATED: CPT

## 2021-11-21 PROCEDURE — 6360000002 HC RX W HCPCS: Performed by: FAMILY MEDICINE

## 2021-11-21 PROCEDURE — 6370000000 HC RX 637 (ALT 250 FOR IP): Performed by: FAMILY MEDICINE

## 2021-11-21 PROCEDURE — 6370000000 HC RX 637 (ALT 250 FOR IP): Performed by: INTERNAL MEDICINE

## 2021-11-21 PROCEDURE — 85025 COMPLETE CBC W/AUTO DIFF WBC: CPT

## 2021-11-21 PROCEDURE — 82962 GLUCOSE BLOOD TEST: CPT

## 2021-11-21 PROCEDURE — 36415 COLL VENOUS BLD VENIPUNCTURE: CPT

## 2021-11-21 PROCEDURE — 1200000000 HC SEMI PRIVATE

## 2021-11-21 PROCEDURE — 2700000000 HC OXYGEN THERAPY PER DAY

## 2021-11-21 PROCEDURE — 6360000002 HC RX W HCPCS: Performed by: SPECIALIST

## 2021-11-21 PROCEDURE — 90935 HEMODIALYSIS ONE EVALUATION: CPT

## 2021-11-21 PROCEDURE — 2580000003 HC RX 258: Performed by: FAMILY MEDICINE

## 2021-11-21 PROCEDURE — 5A1D70Z PERFORMANCE OF URINARY FILTRATION, INTERMITTENT, LESS THAN 6 HOURS PER DAY: ICD-10-PCS | Performed by: INTERNAL MEDICINE

## 2021-11-21 PROCEDURE — 80048 BASIC METABOLIC PNL TOTAL CA: CPT

## 2021-11-21 PROCEDURE — 87040 BLOOD CULTURE FOR BACTERIA: CPT

## 2021-11-21 RX ORDER — IPRATROPIUM BROMIDE AND ALBUTEROL SULFATE 2.5; .5 MG/3ML; MG/3ML
1 SOLUTION RESPIRATORY (INHALATION) EVERY 6 HOURS
Status: DISCONTINUED | OUTPATIENT
Start: 2021-11-21 | End: 2021-11-21

## 2021-11-21 RX ORDER — MIDODRINE HYDROCHLORIDE 5 MG/1
5 TABLET ORAL ONCE
Status: COMPLETED | OUTPATIENT
Start: 2021-11-21 | End: 2021-11-21

## 2021-11-21 RX ADMIN — MIDODRINE HYDROCHLORIDE 5 MG: 5 TABLET ORAL at 22:10

## 2021-11-21 RX ADMIN — HYDROCODONE BITARTRATE AND ACETAMINOPHEN 1 TABLET: 5; 325 TABLET ORAL at 08:00

## 2021-11-21 RX ADMIN — HEPARIN SODIUM 5000 UNITS: 10000 INJECTION INTRAVENOUS; SUBCUTANEOUS at 05:31

## 2021-11-21 RX ADMIN — HEPARIN SODIUM 5000 UNITS: 10000 INJECTION INTRAVENOUS; SUBCUTANEOUS at 22:10

## 2021-11-21 RX ADMIN — Medication 2000 MG: at 13:07

## 2021-11-21 RX ADMIN — INSULIN LISPRO 3 UNITS: 100 INJECTION, SOLUTION INTRAVENOUS; SUBCUTANEOUS at 18:13

## 2021-11-21 RX ADMIN — HEPARIN SODIUM 5000 UNITS: 10000 INJECTION INTRAVENOUS; SUBCUTANEOUS at 13:07

## 2021-11-21 RX ADMIN — CYCLOBENZAPRINE 5 MG: 10 TABLET, FILM COATED ORAL at 15:09

## 2021-11-21 RX ADMIN — CARVEDILOL 12.5 MG: 6.25 TABLET, FILM COATED ORAL at 18:13

## 2021-11-21 RX ADMIN — CARVEDILOL 12.5 MG: 6.25 TABLET, FILM COATED ORAL at 11:14

## 2021-11-21 RX ADMIN — ASPIRIN 81 MG: 81 TABLET, COATED ORAL at 11:15

## 2021-11-21 RX ADMIN — ATORVASTATIN CALCIUM 40 MG: 40 TABLET, FILM COATED ORAL at 11:15

## 2021-11-21 RX ADMIN — Medication 10 ML: at 21:39

## 2021-11-21 RX ADMIN — INSULIN LISPRO 3 UNITS: 100 INJECTION, SOLUTION INTRAVENOUS; SUBCUTANEOUS at 13:08

## 2021-11-21 RX ADMIN — ONDANSETRON 4 MG: 2 INJECTION INTRAMUSCULAR; INTRAVENOUS at 09:11

## 2021-11-21 RX ADMIN — CLOPIDOGREL 75 MG: 75 TABLET, FILM COATED ORAL at 11:15

## 2021-11-21 ASSESSMENT — ENCOUNTER SYMPTOMS
ABDOMINAL PAIN: 0
VOMITING: 0
BACK PAIN: 1
NAUSEA: 0
ABDOMINAL DISTENTION: 0
CHEST TIGHTNESS: 0
COUGH: 0
SHORTNESS OF BREATH: 0
SORE THROAT: 0
WHEEZING: 0
CHOKING: 0

## 2021-11-21 ASSESSMENT — PAIN SCALES - GENERAL
PAINLEVEL_OUTOF10: 7
PAINLEVEL_OUTOF10: 0

## 2021-11-21 NOTE — FLOWSHEET NOTE
11/21/21 1035   Vital Signs   BP (!) 124/54   Temp 98.9 °F (37.2 °C)   Pulse 62   Weight 248 lb 3.8 oz (112.6 kg)   Weight Method Bed scale   Percent Weight Change -0.71   Post-Hemodialysis Assessment   Post-Treatment Procedures Blood returned; Catheter capped, clamped and heparinized x 2 ports   Machine Disinfection Process Exterior Machine Disinfection   Rinseback Volume (ml) 300 ml   Total Liters Processed (l/min) 47.4 l/min   Dialyzer Clearance Lightly streaked   Duration of Treatment (minutes) 180 minutes   Hemodialysis Output (ml) 2000 ml   NET Removed (ml) 1700 ml   Tolerated Treatment Good   Patient Response to Treatment tolerated well blood returned, cath care per policy/procedure, lines flushed, heparin instilled, ports capped

## 2021-11-21 NOTE — PROGRESS NOTES
Vascular Surgery Progress Note    Pt is being seen in f/u today regarding     Subjective  Pt s/e. Pain well controlled. Underwent dialysis yesterday. Complaining of mild back pain. Current Medications:    sodium chloride      dextrose        simethicone, HYDROcodone-acetaminophen, cyclobenzaprine, sodium chloride flush, sodium chloride, promethazine **OR** ondansetron, polyethylene glycol, acetaminophen **OR** acetaminophen, glucose, dextrose, glucagon (rDNA), dextrose, trimethobenzamide    ceFAZolin  2,000 mg IntraVENous Q48H    ceFAZolin  2,000 mg IntraVENous Once per day on Tue Thu Sat    aspirin  81 mg Oral Daily    atorvastatin  40 mg Oral Daily    carvedilol  12.5 mg Oral BID WC    clopidogrel  75 mg Oral Daily    insulin lispro  3 Units SubCUTAneous TID AC    insulin glargine  20 Units SubCUTAneous Daily    sodium chloride flush  10 mL IntraVENous 2 times per day    heparin (porcine)  5,000 Units SubCUTAneous 3 times per day        PHYSICAL EXAM:    BP (!) 153/62   Pulse 71   Temp 97.7 °F (36.5 °C) (Oral)   Resp 18   Ht 5' 4\" (1.626 m)   Wt 255 lb 11.7 oz (116 kg)   SpO2 96%   BMI 43.90 kg/m²     Intake/Output Summary (Last 24 hours) at 11/21/2021 0839  Last data filed at 11/21/2021 0541  Gross per 24 hour   Intake 420 ml   Output 1800 ml   Net -1380 ml          Gen: awake, alert and oriented x3, no apparent distress  CVS: RRR  Neck: Right IJ tunneled line site with minimal purulence draining  Resp: No increased work of breathing on 5L NC  Abd: Soft, non-tender, non-distended  R LE: warm, sensation intact, pulses palpable  L LE: warm, sensation intact. Pulses palpable  Fistula with + thrill     LABS:    Lab Results   Component Value Date    WBC 12.0 (H) 11/21/2021    HGB 9.1 (L) 11/21/2021    HCT 27.9 (L) 11/21/2021     11/21/2021    PROTIME 13.5 (H) 11/18/2021    INR 1.2 11/18/2021    K 3.9 11/21/2021    BUN 48 (H) 11/21/2021    CREATININE 5.2 (H) 11/21/2021       A/P  73 y.o. female with staph bacteremia.   Vascular surgery consulted for removal of tunneled right internal jugular line removal.  Line was removed at bedside and tube was sent for culture 11/18/2021    Appreciate infectious disease input      Tee Lopez MD

## 2021-11-21 NOTE — PROGRESS NOTES
[address] [place] [address] [facility]  Progress Note    CC: MSSA bacteremia   Face to face encounter   SUBJECTIVE:  Patient is tolerating medications. No reported adverse drug reactions. ROS: No nausea, vomiting, diarrhea. No fevers. On 5 liters nasal canula.   Assisted patient to sitting up at bedside- she felt dizzy  She reports back pain- to lumbar area- about the same as yesterday     Medications:  Scheduled Meds:   ceFAZolin  2,000 mg IntraVENous Q48H    ceFAZolin  2,000 mg IntraVENous Once per day on Tue Thu Sat    aspirin  81 mg Oral Daily    atorvastatin  40 mg Oral Daily    carvedilol  12.5 mg Oral BID WC    clopidogrel  75 mg Oral Daily    insulin lispro  3 Units SubCUTAneous TID AC    insulin glargine  20 Units SubCUTAneous Daily    sodium chloride flush  10 mL IntraVENous 2 times per day    heparin (porcine)  5,000 Units SubCUTAneous 3 times per day     Continuous Infusions:   sodium chloride      dextrose       PRN Meds:simethicone, HYDROcodone-acetaminophen, cyclobenzaprine, sodium chloride flush, sodium chloride, promethazine **OR** ondansetron, polyethylene glycol, acetaminophen **OR** acetaminophen, glucose, dextrose, glucagon (rDNA), dextrose, trimethobenzamide  OBJECTIVE:  Patient Vitals for the past 24 hrs:   BP Temp Temp src Pulse Resp SpO2 Weight   11/21/21 1100 115/62 98.6 °F (37 °C) Temporal 69 18 95 % --   11/21/21 1035 (!) 124/54 98.9 °F (37.2 °C) -- 62 -- -- 248 lb 3.8 oz (112.6 kg)   11/21/21 1000 (!) 128/56 -- -- 68 -- -- --   11/21/21 0930 137/64 -- -- 67 -- -- --   11/21/21 0900 (!) 155/63 -- -- 72 -- -- --   11/21/21 0800 (!) 153/62 -- -- 71 -- -- --   11/21/21 0736 (!) 149/69 -- -- 73 -- -- --   11/21/21 0730 (!) 143/58 98.7 °F (37.1 °C) -- 73 -- -- 250 lb (113.4 kg)   11/20/21 2330 130/72 97.7 °F (36.5 °C) Oral 77 18 96 % --   11/20/21 1833 (!) 158/60 98.2 °F (36.8 °C) -- 71 18 -- 255 lb 11.7 oz (116 kg)   11/20/21 1800 133/71 -- -- 79 -- -- --   11/20/21 1730 (!) 133/49 -- -- 74 -- -- --   11/20/21 1700 (!) 140/70 -- -- 77 -- -- --   11/20/21 1638 (!) 147/71 98.5 °F (36.9 °C) -- 71 20 -- 258 lb 13.1 oz (117.4 kg)     Constitutional: The patient is awake, alert, and oriented. Feeling better today - still with back pain   Skin: Warm and dry. No rashes were noted. Head: Eyes show round, and reactive pupils. No jaundice. Mouth: Moist mucous membranes, no ulcerations, no thrush. Neck: Supple to movements. No lymphadenopathy. Chest: No use of accessory muscles to breathe. Symmetrical expansion. Auscultation reveals no wheezing, crackles, or rhonchi. Cardiovascular: S1 and S2 are rhythmic and regular. Abdomen: Positive bowel sounds to auscultation. Extremities: No clubbing, no cyanosis, some edema. Has tubi- on to lower extremities   PIV  Right fem HD temp line placed    Laboratory and Tests Review:  Lab Results   Component Value Date    WBC 12.0 (H) 11/21/2021    WBC 13.3 (H) 11/19/2021    WBC 15.0 (H) 11/17/2021    HGB 9.1 (L) 11/21/2021    HCT 27.9 (L) 11/21/2021    .4 (H) 11/21/2021     11/21/2021     Lab Results   Component Value Date    NEUTROABS 9.39 (H) 11/21/2021    NEUTROABS 11.40 (H) 11/19/2021    NEUTROABS 13.34 (H) 11/17/2021     No results found for: CRP  Lab Results   Component Value Date    SEDRATE 103 (H) 11/21/2021     Lab Results   Component Value Date    ALT 21 11/19/2021    AST 30 11/19/2021    ALKPHOS 91 11/19/2021    BILITOT 0.5 11/19/2021     Lab Results   Component Value Date     11/21/2021    K 3.9 11/21/2021    K 4.6 11/19/2021    CL 94 11/21/2021    CO2 21 11/21/2021    BUN 48 11/21/2021    CREATININE 5.2 11/21/2021    GFRAA 10 11/21/2021    LABGLOM 8 11/21/2021    GLUCOSE 80 11/21/2021    PROT 6.5 11/19/2021    LABALBU 3.5 11/19/2021    CALCIUM 7.5 11/21/2021    BILITOT 0.5 11/19/2021    ALKPHOS 91 11/19/2021    AST 30 11/19/2021    ALT 21 11/19/2021     Radiology:  CT  Impression:        1.   There is no nephrolithiasis, hydronephrosis, hydroureter or other   evidence for acute obstructive uropathy. 2. Probable cholelithiasis. 3. Mild diverticulosis. No acute diverticulitis seen. 4. Moderate-sized fat containing umbilical hernia     Microbiology:   11/17/2021- blood cx- staph aureus - MSSA  11/18/2021- blood cx- staph aureus   11/19/2021- blood cx- Staph aureus     11/21/2021- blood cx- pending     11/19/2021- tip cx- ++ > 15 colonies staph aureus     ASSESSMENT:  · MSSA bacteremia    · S/P HD cath removal- CLABSI  · Leukocytosis   · ESRD- HD   · S/p temp HD line placed to right groin   · Rule out discitis vertebral osteo-    PLAN:  · Continue Ancef- 2 grams -2 grams- 3 grams- has been ordered   · Had ancef- 11/19/2021 - ancef to be dosed again to day- patient had HD earlier today   · Check cultures  · ECHO/ PENELOPE ordered   · Will need MRI of lumbar spine- will need to coordinate and discuss with nephrology   · Monitor labs- repeat serial blood cultures ordered     TAWNYA Manjarrez - CNS  11:40 AM     Pt seen and examined. Above discussed agree with advanced practice nurse. Labs, cultures, and radiographs reviewed. Face to Face encounter occurred. Changes made as necessary.      Jada Mina MD  11/21/2021

## 2021-11-21 NOTE — PROGRESS NOTES
patient on sliding scale insulin and Lantus    Anemia of chronic kidney disease   Hemoglobin    Date  Value  Ref Range  Status    11/21/2021  9.1 (L)  11.5 - 15.5 g/dL  Final    11/19/2021  9.2 (L)  11.5 - 15.5 g/dL  Final    11/17/2021  11.0 (L)  11.5 - 15.5 g/dL  Final   · Continue to monitor H&H  · Monitor for signs of bleeding      11/21/2021, 10:23 AM  Sugar Garrison MD  [facility] Physician - [address] [place], [place]    Subjective         Today, She is seen and Examined at Bedside today. Patient is A & O x 3. Case discussed with the nurse  Pertinent positives mentioned in review of system. All labs and imaging was reviewed, and appropriate steps were taken     Review of Systems   Constitutional: Negative for activity change, chills, diaphoresis and fatigue. HENT: Negative for congestion and sore throat. Eyes: Negative for visual disturbance. Respiratory: Negative for cough, choking, chest tightness, shortness of breath and wheezing. Cardiovascular: Negative for chest pain and leg swelling. Gastrointestinal: Negative for abdominal distention, abdominal pain, nausea and vomiting. Musculoskeletal: Positive for back pain. Skin: Negative for rash. Neurological: Negative for dizziness, syncope, weakness, numbness and headaches. Psychiatric/Behavioral: Negative for behavioral problems. Objective    PHYSICAL EXAMINATION:  Blood pressure (!) 128/56, pulse 68, temperature 98.7 °F (37.1 °C), resp. rate 18, height 5' 4\" (1.626 m), weight 250 lb (113.4 kg), SpO2 96 %. Physical Exam  HENT:      Head: Normocephalic. Eyes:      Pupils: Pupils are equal, round, and reactive to light. Cardiovascular:      Rate and Rhythm: Normal rate. Heart sounds: No murmur heard. Comments: Right groin HD cath in place   Pulmonary:      Effort: Pulmonary effort is normal.      Breath sounds: Normal breath sounds.    Abdominal: promethazine **OR** ondansetron, polyethylene glycol, acetaminophen **OR** acetaminophen, glucose, dextrose, glucagon (rDNA), dextrose, trimethobenzamide

## 2021-11-21 NOTE — PROGRESS NOTES
@      Intake/Output Summary (Last 24 hours) at 11/21/2021 1430  Last data filed at 11/21/2021 1035  Gross per 24 hour   Intake 420 ml   Output 3800 ml   Net -3380 ml         Wt Readings from Last 3 Encounters:   11/21/21 248 lb 3.8 oz (112.6 kg)   09/04/21 240 lb 1.3 oz (108.9 kg)       Constitutional:  in no acute distress  Oral: mucus membranes moist  Neck: no JVD  Cardiovascular: S1, S2 regular rhythm, no murmur,or rub  Respiratory:  No crackles, no wheeze  Gastrointestinal:  Soft, nontender, nondistended, NABS  Ext: no edema, feet warm  Skin: dry, no rash  Neuro: awake, alert, interactive      DATA:    Recent Labs     11/19/21  0454 11/21/21  0501   WBC 13.3* 12.0*   HGB 9.2* 9.1*   HCT 29.0* 27.9*   .3* 100.4*   * 146     Recent Labs     11/19/21  0454 11/21/21  0501    134   K 4.6 3.9   CL 93* 94*   CO2 21* 21*   BUN 37* 48*   CREATININE 4.2* 5.2*   ALT 21  --    AST 30  --    BILITOT 0.5  --    ALKPHOS 91  --        No results found for: LABPROT    ASSESSMENT / RECOMMENDATIONS:      1.  End-stage renal disease, on hemodialysis twice weekly. 2.  Gram-positive cocci bacteremia/MSSA on Ancef per ID  3. Anemia of chronic disease. 4.  Hypertension. Plan :    HD today tolerated well UF 1800 cc   Abx per ID service   she is for PENELOPE   Consideration for MRI spine . Would avoid gadolinum .    If deemed clinically necessary to give gadolinium then will plan dialysis for three days in a row (to start within 1-2 hours of receiving gadolinium )    Electronically signed by Ely Cushing, MD on 11/21/2021 at 2:30 PM

## 2021-11-22 LAB
METER GLUCOSE: 123 MG/DL (ref 74–99)
METER GLUCOSE: 133 MG/DL (ref 74–99)
METER GLUCOSE: 183 MG/DL (ref 74–99)
METER GLUCOSE: 217 MG/DL (ref 74–99)
METER GLUCOSE: 271 MG/DL (ref 74–99)

## 2021-11-22 PROCEDURE — 5A1D70Z PERFORMANCE OF URINARY FILTRATION, INTERMITTENT, LESS THAN 6 HOURS PER DAY: ICD-10-PCS | Performed by: INTERNAL MEDICINE

## 2021-11-22 PROCEDURE — 6370000000 HC RX 637 (ALT 250 FOR IP): Performed by: INTERNAL MEDICINE

## 2021-11-22 PROCEDURE — 82962 GLUCOSE BLOOD TEST: CPT

## 2021-11-22 PROCEDURE — 6370000000 HC RX 637 (ALT 250 FOR IP): Performed by: FAMILY MEDICINE

## 2021-11-22 PROCEDURE — 2580000003 HC RX 258: Performed by: FAMILY MEDICINE

## 2021-11-22 PROCEDURE — 2700000000 HC OXYGEN THERAPY PER DAY

## 2021-11-22 PROCEDURE — 1200000000 HC SEMI PRIVATE

## 2021-11-22 PROCEDURE — 99231 SBSQ HOSP IP/OBS SF/LOW 25: CPT | Performed by: PHYSICIAN ASSISTANT

## 2021-11-22 PROCEDURE — 6360000002 HC RX W HCPCS: Performed by: FAMILY MEDICINE

## 2021-11-22 PROCEDURE — 87040 BLOOD CULTURE FOR BACTERIA: CPT

## 2021-11-22 PROCEDURE — 36415 COLL VENOUS BLD VENIPUNCTURE: CPT

## 2021-11-22 PROCEDURE — 90935 HEMODIALYSIS ONE EVALUATION: CPT

## 2021-11-22 PROCEDURE — 6360000002 HC RX W HCPCS: Performed by: NURSE PRACTITIONER

## 2021-11-22 RX ADMIN — HYDROCODONE BITARTRATE AND ACETAMINOPHEN 1 TABLET: 5; 325 TABLET ORAL at 10:15

## 2021-11-22 RX ADMIN — CLOPIDOGREL 75 MG: 75 TABLET, FILM COATED ORAL at 11:09

## 2021-11-22 RX ADMIN — INSULIN GLARGINE 20 UNITS: 100 INJECTION, SOLUTION SUBCUTANEOUS at 11:10

## 2021-11-22 RX ADMIN — HEPARIN SODIUM 5000 UNITS: 10000 INJECTION INTRAVENOUS; SUBCUTANEOUS at 14:32

## 2021-11-22 RX ADMIN — Medication 10 ML: at 11:10

## 2021-11-22 RX ADMIN — CYCLOBENZAPRINE 5 MG: 10 TABLET, FILM COATED ORAL at 05:18

## 2021-11-22 RX ADMIN — CARVEDILOL 12.5 MG: 6.25 TABLET, FILM COATED ORAL at 16:58

## 2021-11-22 RX ADMIN — Medication 10 ML: at 20:50

## 2021-11-22 RX ADMIN — ASPIRIN 81 MG: 81 TABLET, COATED ORAL at 11:09

## 2021-11-22 RX ADMIN — Medication 2000 MG: at 16:58

## 2021-11-22 RX ADMIN — HEPARIN SODIUM 5000 UNITS: 10000 INJECTION INTRAVENOUS; SUBCUTANEOUS at 22:43

## 2021-11-22 RX ADMIN — ATORVASTATIN CALCIUM 40 MG: 40 TABLET, FILM COATED ORAL at 11:09

## 2021-11-22 RX ADMIN — HEPARIN SODIUM 5000 UNITS: 10000 INJECTION INTRAVENOUS; SUBCUTANEOUS at 05:13

## 2021-11-22 RX ADMIN — INSULIN LISPRO 3 UNITS: 100 INJECTION, SOLUTION INTRAVENOUS; SUBCUTANEOUS at 16:59

## 2021-11-22 ASSESSMENT — PAIN DESCRIPTION - ONSET: ONSET: ON-GOING

## 2021-11-22 ASSESSMENT — PAIN DESCRIPTION - LOCATION: LOCATION: BACK;ARM

## 2021-11-22 ASSESSMENT — PAIN DESCRIPTION - PROGRESSION: CLINICAL_PROGRESSION: GRADUALLY IMPROVING

## 2021-11-22 ASSESSMENT — PAIN DESCRIPTION - PAIN TYPE: TYPE: ACUTE PAIN

## 2021-11-22 ASSESSMENT — PAIN SCALES - GENERAL
PAINLEVEL_OUTOF10: 0
PAINLEVEL_OUTOF10: 0
PAINLEVEL_OUTOF10: 8
PAINLEVEL_OUTOF10: 4
PAINLEVEL_OUTOF10: 0

## 2021-11-22 ASSESSMENT — PAIN - FUNCTIONAL ASSESSMENT: PAIN_FUNCTIONAL_ASSESSMENT: PREVENTS OR INTERFERES SOME ACTIVE ACTIVITIES AND ADLS

## 2021-11-22 ASSESSMENT — PAIN DESCRIPTION - FREQUENCY: FREQUENCY: CONTINUOUS

## 2021-11-22 ASSESSMENT — PAIN DESCRIPTION - ORIENTATION: ORIENTATION: RIGHT;LOWER

## 2021-11-22 ASSESSMENT — PAIN DESCRIPTION - DESCRIPTORS: DESCRIPTORS: ACHING;CONSTANT;DISCOMFORT

## 2021-11-22 NOTE — FLOWSHEET NOTE
11/22/21 1015   Vital Signs   BP (!) 168/74   Temp 98.5 °F (36.9 °C)   Pulse 61   Weight 247 lb 12.8 oz (112.4 kg)   Weight Method Bed scale   Percent Weight Change -1.23   Pain Assessment   Pain Level 8   Post-Hemodialysis Assessment   Post-Treatment Procedures Blood returned; Catheter capped, clamped and heparinized x 2 ports   Machine Disinfection Process Exterior Machine Disinfection   Rinseback Volume (ml) 300 ml   Total Liters Processed (l/min) 39 l/min   Dialyzer Clearance Lightly streaked   Duration of Treatment (minutes) 180 minutes   Heparin amount administered during treatment (units) 0 units   Hemodialysis Intake (ml) 300 ml   Hemodialysis Output (ml) 2000 ml   NET Removed (ml) 1700 ml   Tolerated Treatment Good   Patient Response to Treatment tolerated well, blood returned, cath care per policy/procedure, lines flushed, heparin instilled, ports capped, pain meds given at end of treatment for back pain

## 2021-11-22 NOTE — PLAN OF CARE
Problem: Falls - Risk of:  Goal: Will remain free from falls  Description: Will remain free from falls  Outcome: Met This Shift  Goal: Absence of physical injury  Description: Absence of physical injury  Outcome: Met This Shift     Problem: Skin Integrity:  Goal: Will show no infection signs and symptoms  Description: Will show no infection signs and symptoms  Outcome: Met This Shift     Problem: Pain:  Goal: Pain level will decrease  Description: Pain level will decrease  Outcome: Met This Shift  Goal: Control of acute pain  Description: Control of acute pain  Outcome: Met This Shift

## 2021-11-22 NOTE — PROGRESS NOTES
Vascular Surgery Progress Note    CC: ESRD requiring HD    HISTORY:  The patient is awake, alert, and oriented. States she is overall feeling well. Denies chills, nausea, vomiting, diarrhea.     IMPRESSION:  CLABSI, s/p removal of tdc 11/18, POD # 2 s/p insertion of temporary hemodialysis catheter    PLAN:    Continue to use temporary catheter for hemodialysis as needed   Will plan for insertion of tunneled hd catheter, removal of temporary once cleared by ID   Likely will need superficialization of LUE AVF, will plan as outpatient    Patient Active Problem List   Diagnosis Code    Nausea & vomiting R11.2    Bacteremia R78.81    CLABSI (central line-associated bloodstream infection) T80.211A    ESRD on hemodialysis ([facility] [address] N18.6, Z99.2    Fever, unspecified R50.9       Current Medications:    sodium chloride      dextrose        simethicone, HYDROcodone-acetaminophen, cyclobenzaprine, sodium chloride flush, sodium chloride, promethazine **OR** ondansetron, polyethylene glycol, acetaminophen **OR** acetaminophen, glucose, dextrose, glucagon (rDNA), dextrose, trimethobenzamide    ceFAZolin  2,000 mg IntraVENous Q48H    ceFAZolin  2,000 mg IntraVENous Once per day on Tue Thu Sat    aspirin  81 mg Oral Daily    atorvastatin  40 mg Oral Daily    carvedilol  12.5 mg Oral BID WC    clopidogrel  75 mg Oral Daily    insulin lispro  3 Units SubCUTAneous TID AC    insulin glargine  20 Units SubCUTAneous Daily    sodium chloride flush  10 mL IntraVENous 2 times per day    heparin (porcine)  5,000 Units SubCUTAneous 3 times per day          PHYSICAL EXAM:    Vitals:    11/22/21 1045   BP: (!) 133/52   Pulse: 82   Resp: 18   Temp: 98.2 °F (36.8 °C)   SpO2: 93%     CONSTITUTIONAL:  awake, alert, cooperative, no apparent distress  LUNGS:  no increased work of breathing, good air exchange  CARDIOVASCULAR:  regular rate and rhythm  ABDOMEN:  soft, non-distended and non-tender  LUE: + thrill through AVF  RLE: temporary hemodialysis catheter present without hematoma or ttp    LABS:    Lab Results   Component Value Date    WBC 12.0 (H) 11/21/2021    HGB 9.1 (L) 11/21/2021    HCT 27.9 (L) 11/21/2021     11/21/2021    PROTIME 13.5 (H) 11/18/2021    INR 1.2 11/18/2021    K 3.9 11/21/2021    BUN 48 (H) 11/21/2021    CREATININE 5.2 (H) 11/21/2021       RADIOLOGY:

## 2021-11-22 NOTE — PROGRESS NOTES
OT SESSION ATTEMPT     Date:2021  Patient Name: Essence Molina  MRN: [id number]  : 1955  Room: [address] [place] [address]     Occupational therapy orders received/chart review completed and OT session attempted this date. Pt off unit for dialysis this date. Will reattempt OT eval at a later time/date.     Sudarshan Michaels, [address] OTR/L #[id number]

## 2021-11-22 NOTE — PLAN OF CARE
Problem: Falls - Risk of:  Goal: Will remain free from falls  Description: Will remain free from falls  11/21/2021 2356 by Karen Adams RN  Outcome: Met This Shift  11/21/2021 1213 by Ana Paula Nguyễn RN  Outcome: Met This Shift  Goal: Absence of physical injury  Description: Absence of physical injury  11/21/2021 2356 by Karen Adams RN  Outcome: Met This Shift  11/21/2021 1213 by Ana Paula Nguyễn RN  Outcome: Met This Shift

## 2021-11-22 NOTE — PROGRESS NOTES
Hospitalist Progress Note      SYNOPSIS: Patient admitted on 2021 for nausea and fever. Blood cultures were positive for Staph aureus. Vascular surgery was consulted for removal of tunneled dialysis catheter. Serial blood cultures are still remain positive for staph aureus. Vascular surgery also on board. ID also on board. SUBJECTIVE:    Patient seen and examined. She was seen in the hemodialysis suite. She complained of lower back pain and rated it at 8 out of 10. Review of systems otherwise negative. Records reviewed. Temp (24hrs), Av.3 °F (36.8 °C), Min:98 °F (36.7 °C), Max:98.5 °F (36.9 °C)    DIET: ADULT DIET; Regular; Low Sodium (2 gm); Low Potassium (Less than 3000 mg/day)  Diet NPO  CODE: Full Code    Intake/Output Summary (Last 24 hours) at 2021 1334  Last data filed at 2021 1015  Gross per 24 hour   Intake 300 ml   Output 2000 ml   Net -1700 ml       OBJECTIVE:    BP (!) 133/52   Pulse 82   Temp 98.2 °F (36.8 °C) (Oral)   Resp 18   Ht 5' 4\" (1.626 m)   Wt 247 lb 12.8 oz (112.4 kg)   SpO2 93%   BMI 42.53 kg/m²     General appearance: No apparent distress, appears stated age and cooperative. miorbidly obese  HEENT:  Conjunctivae/corneas clear. Neck: Supple. No jugular venous distention. Respiratory: Clear to auscultation bilaterally, normal respiratory effort  Cardiovascular: Regular rate rhythm, normal S1-S2  Abdomen: Soft, nontender, nondistended  Musculoskeletal: No clubbing, cyanosis, no bilateral lower extremity edema. Brisk capillary refill. Skin:  No rashes  on visible skin  Neurologic: awake, alert and following commands     ASSESSMENT:  #Staph aureus bacteremia  -repeat blood cultures still positive for Staph  -On IV ancef  -ID on board  -Dialysis catheter was removed. Now has temporary dialysis catheter    #Back pain  -patient complaining of back pain today, which she says is new.  Rates it at 8/10  -if pain persists and doesn't respond to pain meds, will get imaging to review for discitis in light of staph bacteremia    #ESRD on HD  -nephrology on board  -Having dialysis today. Tunneled dialysis catheter removed and now has temporary dialysis catheter in place    #Type 2 diabetes mellitus  -on ISS. -On Lantus. -Accuchecks ACHS    #Anemia of chronic disease: Hb is ~ 9. Will trend    #CAD: On aspirin and statin as well as Plavix    DVT prophylaxis: Heparin    DISPOSITION: to be determined    Medications:  REVIEWED DAILY    Infusion Medications    sodium chloride      dextrose       Scheduled Medications    ceFAZolin  2,000 mg IntraVENous Q48H    ceFAZolin  2,000 mg IntraVENous Once per day on Tue Thu Sat    aspirin  81 mg Oral Daily    atorvastatin  40 mg Oral Daily    carvedilol  12.5 mg Oral BID WC    clopidogrel  75 mg Oral Daily    insulin lispro  3 Units SubCUTAneous TID AC    insulin glargine  20 Units SubCUTAneous Daily    sodium chloride flush  10 mL IntraVENous 2 times per day    heparin (porcine)  5,000 Units SubCUTAneous 3 times per day     PRN Meds: simethicone, HYDROcodone-acetaminophen, cyclobenzaprine, sodium chloride flush, sodium chloride, promethazine **OR** ondansetron, polyethylene glycol, acetaminophen **OR** acetaminophen, glucose, dextrose, glucagon (rDNA), dextrose, trimethobenzamide    Labs:     Recent Labs     11/21/21  0501   WBC 12.0*   HGB 9.1*   HCT 27.9*          Recent Labs     11/21/21  0501      K 3.9   CL 94*   CO2 21*   BUN 48*   CREATININE 5.2*   CALCIUM 7.5*       No results for input(s): PROT, ALB, ALKPHOS, ALT, AST, BILITOT, AMYLASE, LIPASE in the last 72 hours. No results for input(s): INR in the last 72 hours. No results for input(s): Kaleen Hope in the last 72 hours.     Chronic labs:    Lab Results   Component Value Date    INR 1.2 11/18/2021       Radiology: REVIEWED DAILY    +++++++++++++++++++++++++++++++++++++++++++++++++  Robinson Gilliam MD  [facility] Physician - [address] [place], [place]  +++++++++++++++++++++++++++++++++++++++++++++++++  NOTE: This report was transcribed using voice recognition software. Every effort was made to ensure accuracy; however, inadvertent computerized transcription errors may be present.

## 2021-11-22 NOTE — PROGRESS NOTES
[address] [place] [address] [facility]  MAURICIO[facility]  Progress Note    CC: MSSA bacteremia   Face to face encounter   SUBJECTIVE:  Patient is tolerating medications. No reported adverse drug reactions. ROS: No nausea, vomiting, diarrhea. No fevers. On 5 liters nasal canula. On dialysis. Still reports back pain- to lumbar area-improved; he recognized no body right there either abnormal disposition of this for mobility feels may be muscular.    Although she is Tuesday Thursday Saturday she had dialysis today  Medications:  Scheduled Meds:   ceFAZolin  2,000 mg IntraVENous Q48H    ceFAZolin  2,000 mg IntraVENous Once per day on Tue Thu Sat    aspirin  81 mg Oral Daily    atorvastatin  40 mg Oral Daily    carvedilol  12.5 mg Oral BID WC    clopidogrel  75 mg Oral Daily    insulin lispro  3 Units SubCUTAneous TID AC    insulin glargine  20 Units SubCUTAneous Daily    sodium chloride flush  10 mL IntraVENous 2 times per day    heparin (porcine)  5,000 Units SubCUTAneous 3 times per day     Continuous Infusions:   sodium chloride      dextrose       PRN Meds:simethicone, HYDROcodone-acetaminophen, cyclobenzaprine, sodium chloride flush, sodium chloride, promethazine **OR** ondansetron, polyethylene glycol, acetaminophen **OR** acetaminophen, glucose, dextrose, glucagon (rDNA), dextrose, trimethobenzamide  OBJECTIVE:  Patient Vitals for the past 24 hrs:   BP Temp Temp src Pulse Resp SpO2 Weight   11/22/21 1045 (!) 133/52 98.2 °F (36.8 °C) Oral 82 18 93 % --   11/22/21 1015 (!) 168/74 98.5 °F (36.9 °C) -- 61 -- -- 247 lb 12.8 oz (112.4 kg)   11/22/21 1003 (!) 141/55 -- -- 63 -- -- --   11/22/21 0934 (!) 149/58 -- -- 73 -- -- --   11/22/21 0905 (!) 148/53 -- -- 66 -- -- --   11/22/21 0835 (!) 131/51 -- -- 63 -- -- --   11/22/21 0806 (!) 135/54 -- -- 64 -- -- --   11/22/21 0736 96/66 -- -- 71 -- -- --   11/22/21 0715 (!) 154/75 -- -- 69 -- -- --   11/22/21 0710 (!) 153/63 98.5 °F (36.9 °C) -- 67 -- -- 250 lb 14.1 oz (113.8 kg)   11/21/21 2345 132/78 98 °F (36.7 °C) Temporal 65 18 93 % --   11/21/21 2145 (!) 97/54 -- -- -- -- -- --   11/21/21 2030 (!) 74/40 -- -- -- -- -- --     Constitutional: The patient is awake, alert, and oriented. Skin: Warm and dry. No rashes were noted. Head: Eyes show round, and reactive pupils. No jaundice. Mouth: Moist mucous membranes, no ulcerations, no thrush. Neck: Supple to movements. No lymphadenopathy. Chest: No use of accessory muscles to breathe. Symmetrical expansion. Auscultation reveals no wheezing, crackles, or rhonchi. Cardiovascular: S1 and S2 are rhythmic and regular. Abdomen: Positive bowel sounds to auscultation. Extremities: No clubbing, no cyanosis, some edema. Has tubi- on to lower extremities   PIV  Right fem HD temp line placed    Laboratory and Tests Review:  Lab Results   Component Value Date    WBC 12.0 (H) 11/21/2021    WBC 13.3 (H) 11/19/2021    WBC 15.0 (H) 11/17/2021    HGB 9.1 (L) 11/21/2021    HCT 27.9 (L) 11/21/2021    .4 (H) 11/21/2021     11/21/2021     Lab Results   Component Value Date    NEUTROABS 9.39 (H) 11/21/2021    NEUTROABS 11.40 (H) 11/19/2021    NEUTROABS 13.34 (H) 11/17/2021     No results found for: CRP  Lab Results   Component Value Date    SEDRATE 103 (H) 11/21/2021     Lab Results   Component Value Date    ALT 21 11/19/2021    AST 30 11/19/2021    ALKPHOS 91 11/19/2021    BILITOT 0.5 11/19/2021     Lab Results   Component Value Date     11/21/2021    K 3.9 11/21/2021    K 4.6 11/19/2021    CL 94 11/21/2021    CO2 21 11/21/2021    BUN 48 11/21/2021    CREATININE 5.2 11/21/2021    GFRAA 10 11/21/2021    LABGLOM 8 11/21/2021    GLUCOSE 80 11/21/2021    PROT 6.5 11/19/2021    LABALBU 3.5 11/19/2021    CALCIUM 7.5 11/21/2021    BILITOT 0.5 11/19/2021    ALKPHOS 91 11/19/2021    AST 30 11/19/2021    ALT 21 11/19/2021     Radiology:  CT  Impression:        1.   There is no nephrolithiasis, hydronephrosis, hydroureter or other   evidence for acute obstructive uropathy. 2. Probable cholelithiasis. 3. Mild diverticulosis. No acute diverticulitis seen. 4. Moderate-sized fat containing umbilical hernia     Microbiology:   11/17/2021- blood cx- staph aureus - MSSA  11/18/2021- blood cx- staph aureus   11/19/2021- blood cx- Staph aureus     11/21/2021- blood cx- NGTD  11/22/21 blood cx  Sent-pending  11/19/2021- tip cx- ++ > 15 colonies staph aureus     ASSESSMENT:  · MSSA bacteremia    · S/P HD cath removal- CLABSI  · Leukocytosis   · ESRD- HD   · S/p temp HD line placed to right groin   · Rule out discitis vertebral osteo-    PLAN:  · Continue Ancef- 2 grams -2 grams- 3 grams- has been ordered   · Had ancef- 11/19/2021 - ancef to be dosed again to day- patient had HD earlier today   · Check cultures  · ECHO/ PENELOPE ordered   · Monitor labs- repeat serial blood cultures ordered   · If 11/21/2021 blood cultures remain negative patient will have a chronic tunneled catheter placed on Wednesday    TAWNYA Odom - CNP  2:17 PM   Pt seen and examined. Above discussed agree with advanced practice nurse. Labs, cultures, and radiographs reviewed. Face to Face encounter occurred. Changes made as necessary.      Cassie Jc MD

## 2021-11-23 ENCOUNTER — ANESTHESIA EVENT (OUTPATIENT)
Dept: OPERATING ROOM | Age: 66
DRG: 314 | End: 2021-11-23
Payer: MEDICARE

## 2021-11-23 ENCOUNTER — ANESTHESIA EVENT (OUTPATIENT)
Dept: CARDIAC CATH/INVASIVE PROCEDURES | Age: 66
DRG: 314 | End: 2021-11-23
Payer: MEDICARE

## 2021-11-23 ENCOUNTER — ANESTHESIA (OUTPATIENT)
Dept: CARDIAC CATH/INVASIVE PROCEDURES | Age: 66
DRG: 314 | End: 2021-11-23
Payer: MEDICARE

## 2021-11-23 VITALS
SYSTOLIC BLOOD PRESSURE: 109 MMHG | DIASTOLIC BLOOD PRESSURE: 52 MMHG | RESPIRATION RATE: 18 BRPM | OXYGEN SATURATION: 92 %

## 2021-11-23 LAB
ANION GAP SERPL CALCULATED.3IONS-SCNC: 15 MMOL/L (ref 7–16)
BUN BLDV-MCNC: 33 MG/DL (ref 6–23)
CALCIUM SERPL-MCNC: 7.8 MG/DL (ref 8.6–10.2)
CHLORIDE BLD-SCNC: 92 MMOL/L (ref 98–107)
CO2: 23 MMOL/L (ref 22–29)
CREAT SERPL-MCNC: 5.1 MG/DL (ref 0.5–1)
GFR AFRICAN AMERICAN: 10
GFR NON-AFRICAN AMERICAN: 8 ML/MIN/1.73
GLUCOSE BLD-MCNC: 100 MG/DL (ref 74–99)
HCT VFR BLD CALC: 24.2 % (ref 34–48)
HEMOGLOBIN: 7.7 G/DL (ref 11.5–15.5)
MAGNESIUM: 2 MG/DL (ref 1.6–2.6)
MCH RBC QN AUTO: 31 PG (ref 26–35)
MCHC RBC AUTO-ENTMCNC: 31.8 % (ref 32–34.5)
MCV RBC AUTO: 97.6 FL (ref 80–99.9)
METER GLUCOSE: 114 MG/DL (ref 74–99)
METER GLUCOSE: 143 MG/DL (ref 74–99)
METER GLUCOSE: 252 MG/DL (ref 74–99)
METER GLUCOSE: 271 MG/DL (ref 74–99)
PDW BLD-RTO: 13.3 FL (ref 11.5–15)
PHOSPHORUS: 3.6 MG/DL (ref 2.5–4.5)
PLATELET # BLD: 171 E9/L (ref 130–450)
PMV BLD AUTO: 11.1 FL (ref 7–12)
POTASSIUM SERPL-SCNC: 3.9 MMOL/L (ref 3.5–5)
RBC # BLD: 2.48 E12/L (ref 3.5–5.5)
SODIUM BLD-SCNC: 130 MMOL/L (ref 132–146)
WBC # BLD: 12.4 E9/L (ref 4.5–11.5)

## 2021-11-23 PROCEDURE — 2580000003 HC RX 258: Performed by: FAMILY MEDICINE

## 2021-11-23 PROCEDURE — 82962 GLUCOSE BLOOD TEST: CPT

## 2021-11-23 PROCEDURE — 5A1D70Z PERFORMANCE OF URINARY FILTRATION, INTERMITTENT, LESS THAN 6 HOURS PER DAY: ICD-10-PCS | Performed by: INTERNAL MEDICINE

## 2021-11-23 PROCEDURE — 80048 BASIC METABOLIC PNL TOTAL CA: CPT

## 2021-11-23 PROCEDURE — 93312 ECHO TRANSESOPHAGEAL: CPT | Performed by: INTERNAL MEDICINE

## 2021-11-23 PROCEDURE — 84100 ASSAY OF PHOSPHORUS: CPT

## 2021-11-23 PROCEDURE — 1200000000 HC SEMI PRIVATE

## 2021-11-23 PROCEDURE — 85027 COMPLETE CBC AUTOMATED: CPT

## 2021-11-23 PROCEDURE — 2709999900 HC NON-CHARGEABLE SUPPLY

## 2021-11-23 PROCEDURE — 93325 DOPPLER ECHO COLOR FLOW MAPG: CPT

## 2021-11-23 PROCEDURE — 83735 ASSAY OF MAGNESIUM: CPT

## 2021-11-23 PROCEDURE — 6370000000 HC RX 637 (ALT 250 FOR IP): Performed by: FAMILY MEDICINE

## 2021-11-23 PROCEDURE — 99231 SBSQ HOSP IP/OBS SF/LOW 25: CPT | Performed by: PHYSICIAN ASSISTANT

## 2021-11-23 PROCEDURE — 6360000002 HC RX W HCPCS: Performed by: NURSE ANESTHETIST, CERTIFIED REGISTERED

## 2021-11-23 PROCEDURE — 36415 COLL VENOUS BLD VENIPUNCTURE: CPT

## 2021-11-23 PROCEDURE — 2700000000 HC OXYGEN THERAPY PER DAY

## 2021-11-23 PROCEDURE — 97165 OT EVAL LOW COMPLEX 30 MIN: CPT

## 2021-11-23 PROCEDURE — 93312 ECHO TRANSESOPHAGEAL: CPT

## 2021-11-23 PROCEDURE — 2580000003 HC RX 258: Performed by: NURSE ANESTHETIST, CERTIFIED REGISTERED

## 2021-11-23 PROCEDURE — 93325 DOPPLER ECHO COLOR FLOW MAPG: CPT | Performed by: INTERNAL MEDICINE

## 2021-11-23 PROCEDURE — 93321 DOPPLER ECHO F-UP/LMTD STD: CPT

## 2021-11-23 PROCEDURE — 6360000002 HC RX W HCPCS: Performed by: FAMILY MEDICINE

## 2021-11-23 PROCEDURE — 93320 DOPPLER ECHO COMPLETE: CPT | Performed by: INTERNAL MEDICINE

## 2021-11-23 RX ORDER — SODIUM CHLORIDE 9 MG/ML
INJECTION, SOLUTION INTRAVENOUS CONTINUOUS PRN
Status: DISCONTINUED | OUTPATIENT
Start: 2021-11-23 | End: 2021-11-23 | Stop reason: SDUPTHER

## 2021-11-23 RX ORDER — SODIUM CHLORIDE 9 MG/ML
INJECTION, SOLUTION INTRAVENOUS CONTINUOUS
Status: DISCONTINUED | OUTPATIENT
Start: 2021-11-23 | End: 2021-11-23 | Stop reason: ALTCHOICE

## 2021-11-23 RX ORDER — PROPOFOL 10 MG/ML
INJECTION, EMULSION INTRAVENOUS PRN
Status: DISCONTINUED | OUTPATIENT
Start: 2021-11-23 | End: 2021-11-23 | Stop reason: SDUPTHER

## 2021-11-23 RX ADMIN — ATORVASTATIN CALCIUM 40 MG: 40 TABLET, FILM COATED ORAL at 10:18

## 2021-11-23 RX ADMIN — HYDROCODONE BITARTRATE AND ACETAMINOPHEN 1 TABLET: 5; 325 TABLET ORAL at 21:06

## 2021-11-23 RX ADMIN — CARVEDILOL 12.5 MG: 6.25 TABLET, FILM COATED ORAL at 10:18

## 2021-11-23 RX ADMIN — Medication 10 ML: at 21:07

## 2021-11-23 RX ADMIN — HEPARIN SODIUM 5000 UNITS: 10000 INJECTION INTRAVENOUS; SUBCUTANEOUS at 21:07

## 2021-11-23 RX ADMIN — Medication 10 ML: at 09:00

## 2021-11-23 RX ADMIN — INSULIN GLARGINE 20 UNITS: 100 INJECTION, SOLUTION SUBCUTANEOUS at 10:18

## 2021-11-23 RX ADMIN — SODIUM CHLORIDE: 9 INJECTION, SOLUTION INTRAVENOUS at 08:47

## 2021-11-23 RX ADMIN — ONDANSETRON 4 MG: 2 INJECTION INTRAMUSCULAR; INTRAVENOUS at 00:23

## 2021-11-23 RX ADMIN — INSULIN LISPRO 3 UNITS: 100 INJECTION, SOLUTION INTRAVENOUS; SUBCUTANEOUS at 12:52

## 2021-11-23 RX ADMIN — INSULIN LISPRO 3 UNITS: 100 INJECTION, SOLUTION INTRAVENOUS; SUBCUTANEOUS at 17:14

## 2021-11-23 RX ADMIN — CLOPIDOGREL 75 MG: 75 TABLET, FILM COATED ORAL at 10:18

## 2021-11-23 RX ADMIN — HEPARIN SODIUM 5000 UNITS: 10000 INJECTION INTRAVENOUS; SUBCUTANEOUS at 12:52

## 2021-11-23 RX ADMIN — PROPOFOL 220 MG: 10 INJECTION, EMULSION INTRAVENOUS at 09:04

## 2021-11-23 RX ADMIN — ASPIRIN 81 MG: 81 TABLET, COATED ORAL at 10:18

## 2021-11-23 ASSESSMENT — PAIN SCALES - GENERAL
PAINLEVEL_OUTOF10: 4
PAINLEVEL_OUTOF10: 8
PAINLEVEL_OUTOF10: 8
PAINLEVEL_OUTOF10: 3

## 2021-11-23 ASSESSMENT — PAIN DESCRIPTION - ORIENTATION: ORIENTATION: RIGHT

## 2021-11-23 ASSESSMENT — PAIN DESCRIPTION - LOCATION: LOCATION: SHOULDER

## 2021-11-23 ASSESSMENT — ENCOUNTER SYMPTOMS: DYSPNEA ACTIVITY LEVEL: AFTER AMBULATING 1 FLIGHT OF STAIRS

## 2021-11-23 ASSESSMENT — PAIN DESCRIPTION - PAIN TYPE: TYPE: ACUTE PAIN

## 2021-11-23 ASSESSMENT — LIFESTYLE VARIABLES: SMOKING_STATUS: 0

## 2021-11-23 NOTE — PROGRESS NOTES
Pre PENELOPE exam:    H & P reviewed - Seen in the PACU area prior to PENELOPE. ID requested PENELOPE to r/o endocarditis    She denies any CP or dysphagia      Medical / Surgical history: Reviewed    FamilyHistory: Reviewed    Allergies:  Reviewed    Scheduled Meds:   [START ON 11/24/2021] ceFAZolin  3,000 mg IntraVENous Once    [START ON 12/2/2021] ceFAZolin  3,000 mg IntraVENous Weekly - Thursday    [START ON 11/27/2021] ceFAZolin  2,000 mg IntraVENous Once per day on Tue Sat    aspirin  81 mg Oral Daily    atorvastatin  40 mg Oral Daily    carvedilol  12.5 mg Oral BID WC    clopidogrel  75 mg Oral Daily    insulin lispro  3 Units SubCUTAneous TID AC    insulin glargine  20 Units SubCUTAneous Daily    sodium chloride flush  10 mL IntraVENous 2 times per day    heparin (porcine)  5,000 Units SubCUTAneous 3 times per day     Continuous Infusions:   sodium chloride      sodium chloride      dextrose       PRN Meds:.simethicone, HYDROcodone-acetaminophen, cyclobenzaprine, sodium chloride flush, sodium chloride, promethazine **OR** ondansetron, polyethylene glycol, acetaminophen **OR** acetaminophen, glucose, dextrose, glucagon (rDNA), dextrose, trimethobenzamide      Labs/imaging studies: Reviewed. Physical exam:     Vitals:    11/23/21 0745   BP: (!) 125/53   Pulse: 64   Resp: 16   Temp: 97.5 °F (36.4 °C)   SpO2: 99%       In general, this is a well developed, awake, alert, no apparent distress    HEENT: eyes -conjunctivae pink, Pharynx -Clear  Neck-  no stridor, no carotid bruit. no jugular venous distention   RESPIRATORY: No accessory muscles use. Lung auscultation - few rhonchi  CARDIOVASCULAR:     Heart Ausculation - Regular rate and rhythm, 2/6 systolic murmur, No s3 or rub. No lower extremity edema, Distal pulses palpable  ABDOMEN:  Bowel sounds present.   NEURO / PSYCH: oriented to person, place        Impression/Recommendations:    Bacteremia - ID requested PENELOPE to r/o Endocarditis, Risk benefits of PENELOPE discussed, agreeable for PENELOPE          Kim Ramos MD  11/23/2021  8:46 AM  [facility] ([facility] Cardiology

## 2021-11-23 NOTE — PROGRESS NOTES
[facility] MD Jose Valdez MD Madonna Gimenez, MD Otho Duffel, MD  Progress Note    2021    SUBJECTIVE:    :Seen in her room she is feeling better since the cath was removed  On  . Alert oriented , cooperative      : seen in her room , will clarify with vascular and ID when HD line to be placed back      ; seen in her room , HD today 1800 cc UF . BP ok . Has temporary fem line . Once cleared by ID (repeat cultures negative ) she is to have tesio placed in     : \"I feel so well,\" but cannot be more specific. Denies pain. Denies nausea vomiting indigestion, chest pain or palpitation, shortness of breath at rest.  No swelling. No complications with dialysis. : Feeling much better today. No dyspnea at rest on room air. Appetite improving. ROS otherwise unremarkable. PROBLEM LIST:    Active Problems:    Nausea & vomiting    Bacteremia    CLABSI (central line-associated bloodstream infection)    ESRD on hemodialysis ([facility])    Fever, unspecified  Resolved Problems:    * No resolved hospital problems. *       DIET:    ADULT DIET; Regular; 4 carb choices (60 gm/meal); Low Fat/Low Chol/High Fiber/CHELSEA; Low Sodium (2 gm)  Diet NPO       Allergies : Morphine, Other, and Pcn [penicillins]    Past Medical History:   Diagnosis Date    Brain aneurysm     CLABSI (central line-associated bloodstream infection) 2021    Diabetes mellitus ([facility] [address]     ESRD on hemodialysis ([facility] [address] 2021    H/O heart artery stent     Hyperlipidemia     Hypertension        Past Surgical History:   Procedure Laterality Date    CARDIAC SURGERY       SECTION         History reviewed. No pertinent family history. reports that she has quit smoking. She has never used smokeless tobacco. She reports that she does not drink alcohol and does not use drugs.     Review of Systems:   Constitutional: no fevers , no chills , feels ok   Eyes: no eye pain , no itching , no drainage  Ears, nose, mouth, throat, and face: no ear ,nose pain , hearing is ok ,no nasal drainage   Respiratory: no sob ,no cough ,no wheezing . Cardiovascular: no chest pain , no palpitation ,no sob . Gastrointestinal: no nausea, vomiting , constipation , no abdominal pain . Genitourinary:no urinary retention , no burning , dysuria . No polyuria   Hematologic/lymphatic: no bleeding , no cougulation issues . Musculoskeletal:no joint pain , no swelling . Neurological: no headaches ,no weakness , no numbness . Endocrine: no thirst , no weight issues .      MEDS (scheduled):    [START ON 11/24/2021] ceFAZolin  3,000 mg IntraVENous Once    [START ON 12/2/2021] ceFAZolin  3,000 mg IntraVENous Weekly - Thursday    [START ON 11/27/2021] ceFAZolin  2,000 mg IntraVENous Once per day on Tue Sat    aspirin  81 mg Oral Daily    atorvastatin  40 mg Oral Daily    carvedilol  12.5 mg Oral BID WC    clopidogrel  75 mg Oral Daily    insulin lispro  3 Units SubCUTAneous TID AC    insulin glargine  20 Units SubCUTAneous Daily    sodium chloride flush  10 mL IntraVENous 2 times per day    heparin (porcine)  5,000 Units SubCUTAneous 3 times per day       MEDS (infusions):   sodium chloride      dextrose         MEDS (prn):  simethicone, HYDROcodone-acetaminophen, cyclobenzaprine, sodium chloride flush, sodium chloride, promethazine **OR** ondansetron, polyethylene glycol, acetaminophen **OR** acetaminophen, glucose, dextrose, glucagon (rDNA), dextrose, trimethobenzamide    PHYSICAL EXAM:     Patient Vitals for the past 24 hrs:   BP Temp Temp src Pulse Resp SpO2   11/23/21 1630 (!) 109/50 -- -- -- -- --   11/23/21 1015 (!) 137/55 98.3 °F (36.8 °C) Oral 97 16 95 %   11/23/21 0925 (!) 107/52 -- -- 64 18 92 %   11/23/21 0745 (!) 125/53 97.5 °F (36.4 °C) Temporal 64 16 99 %   11/22/21 2345 138/87 98.4 °F (36.9 °C) Oral 68 18 96 %   11/22/21 2015 (!) 141/92 98.6 °F (37 °C) Oral 71 18 96 % @      Intake/Output Summary (Last 24 hours) at 11/23/2021 1827  Last data filed at 11/23/2021 0919  Gross per 24 hour   Intake 50 ml   Output --   Net 50 ml         Wt Readings from Last 3 Encounters:   11/22/21 247 lb 12.8 oz (112.4 kg)   09/04/21 240 lb 1.3 oz (108.9 kg)       Constitutional:  in no acute distress  Oral: mucus membranes moist  Neck: no JVD  Cardiovascular: S1, S2 regular rhythm, no murmur,or rub  Respiratory:  No crackles, no wheeze  Gastrointestinal:  Soft, nontender, nondistended, NABS  Ext: no edema, feet warm  Skin: dry, no rash  Neuro: awake, alert, interactive      DATA:    Recent Labs     11/21/21  0501 11/23/21  0526   WBC 12.0* 12.4*   HGB 9.1* 7.7*   HCT 27.9* 24.2*   .4* 97.6    171     Recent Labs     11/21/21  0501 11/23/21  0526    130*   K 3.9 3.9   CL 94* 92*   CO2 21* 23   MG  --  2.0   PHOS  --  3.6   BUN 48* 33*   CREATININE 5.2* 5.1*       No results found for: LABPROT    ASSESSMENT       1. End-stage renal disease, on hemodialysis thrice weekly. 2.  Gram-positive cocci bacteremia/MSSA on Ancef per ID  3. Anemia of chronic disease. 4.  Hypertension. PENELOPE no vegetation   Clinically improved      RECOMMENDATIONS  Abx per ID service   For tunneled dialysis catheter tomorrow  Consideration for MRI spine . Would avoid gadolinum .    If deemed clinically necessary to give gadolinium then will plan dialysis for three days in a row (to start within 1-2 hours of receiving gadolinium )  Continue IHD support for solute and volume clearance per holiday schedule this week, Monday, Wednesday, Saturday  Follow labs, BP    Electronically signed by Sharmila Schaefer MD on 11/23/2021

## 2021-11-23 NOTE — ANESTHESIA PRE PROCEDURE
1015    cyclobenzaprine (FLEXERIL) tablet 5 mg  5 mg Oral TID PRN Jeni Mitchell MD   5 mg at 11/22/21 0518    aspirin EC tablet 81 mg  81 mg Oral Daily Kimberly Muñoz, DO   81 mg at 11/22/21 1109    atorvastatin (LIPITOR) tablet 40 mg  40 mg Oral Daily Kimberly Muñoz, DO   40 mg at 11/22/21 1109    carvedilol (COREG) tablet 12.5 mg  12.5 mg Oral BID WC Kimberly Muñoz, DO   12.5 mg at 11/22/21 1658    clopidogrel (PLAVIX) tablet 75 mg  75 mg Oral Daily Kimberly Muñoz, DO   75 mg at 11/22/21 1109    insulin lispro (HUMALOG) injection vial 3 Units  3 Units SubCUTAneous TID AC Kimberly Muñoz, DO   3 Units at 11/22/21 1659    insulin glargine (LANTUS) injection vial 20 Units  20 Units SubCUTAneous Daily Kimberly Muñoz, DO   20 Units at 11/22/21 1110    sodium chloride flush 0.9 % injection 10 mL  10 mL IntraVENous 2 times per day Kimberly Muñoz, DO   10 mL at 11/22/21 2050    sodium chloride flush 0.9 % injection 10 mL  10 mL IntraVENous PRN Kimberly Muñoz, DO        0.9 % sodium chloride infusion  25 mL IntraVENous PRN Kimberly Muñoz, DO        heparin (porcine) injection 5,000 Units  5,000 Units SubCUTAneous 3 times per day Kimberly Muñoz, DO   5,000 Units at 11/22/21 2243    promethazine (PHENERGAN) tablet 12.5 mg  12.5 mg Oral Q6H PRN Kimberly Muñoz, DO   12.5 mg at 11/19/21 0701    Or    ondansetron (ZOFRAN) injection 4 mg  4 mg IntraVENous Q6H PRN Kimberly Muñoz, DO   4 mg at 11/23/21 0023    polyethylene glycol (GLYCOLAX) packet 17 g  17 g Oral Daily PRN Kimberly Muñoz, DO        acetaminophen (TYLENOL) tablet 650 mg  650 mg Oral Q6H PRN Kimberly Muñoz, DO   650 mg at 11/19/21 0249    Or    acetaminophen (TYLENOL) suppository 650 mg  650 mg Rectal Q6H PRN Kimberly Muñoz, DO        glucose (GLUTOSE) 40 % oral gel 15 g  15 g Oral PRN Kimberly Muñoz DO        dextrose 50 % IV solution  12.5 g IntraVENous PRN Kimberly Muñoz DO        glucagon (rDNA) injection 1 mg  1 mg IntraMUSCular PRN DO Lilian Brady dextrose 5 % solution  100 mL/hr IntraVENous PRN Alaina Patel DO        trimethobenzamide Clyda Flash) injection 200 mg  200 mg IntraMUSCular Q6H PRN Ke Sim DO   200 mg at 21 2826     Facility-Administered Medications Ordered in Other Encounters   Medication Dose Route Frequency Provider Last Rate Last Admin    0.9 % sodium chloride infusion   IntraVENous Continuous PRN Honor Glow, APRN - CRNA   New Bag at 21 0847    propofol injection   IntraVENous PRN Honor Glow, APRN - CRNA   220 mg at 21 5287       Allergies:     Allergies   Allergen Reactions    Morphine     Other      bleach    Pcn [Penicillins]        Problem List:    Patient Active Problem List   Diagnosis Code    Nausea & vomiting R11.2    Bacteremia R78.81    CLABSI (central line-associated bloodstream infection) T80.211A    ESRD on hemodialysis ([facility] [address] N18.6, Z99.2    Fever, unspecified R50.9       Past Medical History:        Diagnosis Date    Brain aneurysm     CLABSI (central line-associated bloodstream infection) 2021    Diabetes mellitus ([facility] [address]     ESRD on hemodialysis ([facility] [address] 2021    H/O heart artery stent     Hyperlipidemia     Hypertension        Past Surgical History:        Procedure Laterality Date    CARDIAC SURGERY       SECTION         Social History:    Social History     Tobacco Use    Smoking status: Former Smoker    Smokeless tobacco: Never Used   Substance Use Topics    Alcohol use: Never                                Counseling given: Not Answered      Vital Signs (Current):   Vitals:    21 1630 21 2345 21 0745   BP: (!) 133/56 (!) 141/92 138/87 (!) 125/53   Pulse: 70 71 68 64   Resp:  18 18 16   Temp:  98.6 °F (37 °C) 98.4 °F (36.9 °C) 97.5 °F (36.4 °C)   TempSrc:  Oral Oral Temporal   SpO2:  96% 96% 99%   Weight:       Height:                                                  BP Readings from Last 3 Encounters:   21 (!) 125/53 11/23/21 (!) 109/52   09/04/21 131/78       NPO Status:   > 8hrs                                                                               BMI:   Wt Readings from Last 3 Encounters:   11/22/21 247 lb 12.8 oz (112.4 kg)   09/04/21 240 lb 1.3 oz (108.9 kg)     Body mass index is 42.53 kg/m². CBC:   Lab Results   Component Value Date    WBC 12.4 11/23/2021    RBC 2.48 11/23/2021    HGB 7.7 11/23/2021    HCT 24.2 11/23/2021    MCV 97.6 11/23/2021    RDW 13.3 11/23/2021     11/23/2021       CMP:   Lab Results   Component Value Date     11/23/2021    K 3.9 11/23/2021    K 4.6 11/19/2021    CL 92 11/23/2021    CO2 23 11/23/2021    BUN 33 11/23/2021    CREATININE 5.1 11/23/2021    GFRAA 10 11/23/2021    LABGLOM 8 11/23/2021    GLUCOSE 100 11/23/2021    PROT 6.5 11/19/2021    CALCIUM 7.8 11/23/2021    BILITOT 0.5 11/19/2021    ALKPHOS 91 11/19/2021    AST 30 11/19/2021    ALT 21 11/19/2021       POC Tests: No results for input(s): POCGLU, POCNA, POCK, POCCL, POCBUN, POCHEMO, POCHCT in the last 72 hours. Coags:   Lab Results   Component Value Date    PROTIME 13.5 11/18/2021    INR 1.2 11/18/2021       HCG (If Applicable): No results found for: PREGTESTUR, PREGSERUM, HCG, HCGQUANT     ABGs: No results found for: PHART, PO2ART, AYH3WDJ, NSW1AGP, BEART, K1LSQWTH     Type & Screen (If Applicable):  No results found for: LABABO, LABRH    Drug/Infectious Status (If Applicable):  No results found for: HIV, HEPCAB    COVID-19 Screening (If Applicable):   Lab Results   Component Value Date    COVID19 Not Detected 11/17/2021           Anesthesia Evaluation  Patient summary reviewed   history of anesthetic complications (prolonged emergence): PONV.   Airway: Mallampati: II  TM distance: >3 FB   Neck ROM: full  Mouth opening: > = 3 FB Dental:          Pulmonary:Negative Pulmonary ROS                              Cardiovascular:  Exercise tolerance: poor (<4 METS),   (+) hypertension: mild, CABG/stent (3 stents placed in 2017):, ZIMMERMAN: after ambulating 1 flight of stairs, hyperlipidemia      ECG reviewed               Beta Blocker:  Dose within 24 Hrs         Neuro/Psych:                ROS comment: Diagnosed with brain aneurysm  GI/Hepatic/Renal:   (+) renal disease: ESRD, morbid obesity          Endo/Other:    (+) DiabetesType II DM, poorly controlled, using insulin, . ROS comment: bacteremia Abdominal:   (+) obese,           Vascular: negative vascular ROS. Other Findings:        EKG 11/17/21  Sinus tachycardia with occasional premature ventricular complexes  Moderate voltage criteria for LVH, may be normal variant  Borderline ECG    CHEST XRAY 11/17/21  Mild cardiomegaly and suspect pulmonary vascular congestion       Anesthesia Plan      MAC     ASA 4       Induction: intravenous. Use of blood products discussed with whom consented to blood products. Plan discussed with attending.                   Jadiel Peace DO   11/23/2021

## 2021-11-23 NOTE — ANESTHESIA PRE PROCEDURE
Department of Anesthesiology  Preprocedure Note       Name:  Matthieu Linton   Age:  73 y.o.  :  1955                                          MRN:  [id number]         Date:  2021      Surgeon: Sophie Davis):  Maia Grey MD    Procedure: Procedure(s):  CATHETER INSERTION  TUNNELED HEMODIALYSIS, WITH REMOVAL OF TEMPORARY CATHETER    Medications prior to admission:   Prior to Admission medications    Medication Sig Start Date End Date Taking?  Authorizing Provider   atorvastatin (LIPITOR) 40 MG tablet Take 40 mg by mouth daily   Yes Historical Provider, MD   carvedilol (COREG) 12.5 MG tablet Take 12.5 mg by mouth 2 times daily (with meals)   Yes Historical Provider, MD   clopidogrel (PLAVIX) 75 MG tablet Take 75 mg by mouth daily   Yes Historical Provider, MD   aspirin 81 MG EC tablet Take 81 mg by mouth daily   Yes Historical Provider, MD   Insulin Glargine, 2 Unit Dial, (TOUJEO MAX SOLOSTAR) 300 UNIT/ML SOPN Inject 25 Units into the skin daily   Yes Historical Provider, MD   insulin aspart (NOVOLOG) 100 UNIT/ML injection vial Inject 3 Units into the skin 3 times daily (before meals)   Yes Historical Provider, MD       Current medications:    Current Facility-Administered Medications   Medication Dose Route Frequency Provider Last Rate Last Admin    [START ON 2021] ceFAZolin (ANCEF) 3,000 mg in dextrose 5 % 100 mL IVPB  3,000 mg IntraVENous Once TAWNYA Little CNP        [START ON 2021] ceFAZolin (ANCEF) 3,000 mg in dextrose 5 % 100 mL IVPB  3,000 mg IntraVENous Weekly - Thursday TAWNYA Little - CNP        [START ON 2021] ceFAZolin (ANCEF) 2000 mg in sterile water 20 mL IV syringe  2,000 mg IntraVENous Once per day on Tue Sat Papi Caballero APRN - CNP        [facility]) chewable tablet 80 mg  80 mg Oral Q6H PRN Leora Quintana MD   80 mg at 21 1308    HYDROcodone-acetaminophen (NORCO) 5-325 MG per tablet 1 tablet  1 tablet Oral Q6H PRN Paulette Goins Bethany Pereira, DO   1 tablet at 11/22/21 1015    cyclobenzaprine (FLEXERIL) tablet 5 mg  5 mg Oral TID PRN Rafael Goel MD   5 mg at 11/22/21 0518    aspirin EC tablet 81 mg  81 mg Oral Daily Larayne Litter, DO   81 mg at 11/23/21 1018    atorvastatin (LIPITOR) tablet 40 mg  40 mg Oral Daily Larayne Litter, DO   40 mg at 11/23/21 1018    carvedilol (COREG) tablet 12.5 mg  12.5 mg Oral BID WC Larayne Litter, DO   12.5 mg at 11/23/21 1018    clopidogrel (PLAVIX) tablet 75 mg  75 mg Oral Daily Larayne Litter, DO   75 mg at 11/23/21 1018    insulin lispro (HUMALOG) injection vial 3 Units  3 Units SubCUTAneous TID AC Larayne Litter, DO   3 Units at 11/22/21 1659    insulin glargine (LANTUS) injection vial 20 Units  20 Units SubCUTAneous Daily Larayne Litter, DO   20 Units at 11/23/21 1018    sodium chloride flush 0.9 % injection 10 mL  10 mL IntraVENous 2 times per day Larayne Litter, DO   10 mL at 11/22/21 2050    sodium chloride flush 0.9 % injection 10 mL  10 mL IntraVENous PRN Larayne Litter, DO        0.9 % sodium chloride infusion  25 mL IntraVENous PRN Larayne Litter, DO        heparin (porcine) injection 5,000 Units  5,000 Units SubCUTAneous 3 times per day Larayne Litter, DO   5,000 Units at 11/22/21 2243    promethazine (PHENERGAN) tablet 12.5 mg  12.5 mg Oral Q6H PRN Larayne Litter, DO   12.5 mg at 11/19/21 0701    Or    ondansetron (ZOFRAN) injection 4 mg  4 mg IntraVENous Q6H PRN Larayne Litter, DO   4 mg at 11/23/21 0023    polyethylene glycol (GLYCOLAX) packet 17 g  17 g Oral Daily PRN Larayne Litter, DO        acetaminophen (TYLENOL) tablet 650 mg  650 mg Oral Q6H PRN Larayne Litter, DO   650 mg at 11/19/21 0249    Or    acetaminophen (TYLENOL) suppository 650 mg  650 mg Rectal Q6H PRN Larayne Litter, DO        glucose (GLUTOSE) 40 % oral gel 15 g  15 g Oral PRN Larayne Litter, DO        dextrose 50 % IV solution  12.5 g IntraVENous PRN Larayne Litter, DO        glucagon (rDNA) injection 1 mg  1 mg IntraMUSCular PRN Alaina Patel DO        dextrose 5 % solution  100 mL/hr IntraVENous PRN Alaina Patel DO        trimethobenzamide Clyda Flash) injection 200 mg  200 mg IntraMUSCular Q6H PRN Ke Sim DO   200 mg at 21 8377       Allergies:     Allergies   Allergen Reactions    Morphine     Other      bleach    Pcn [Penicillins]        Problem List:    Patient Active Problem List   Diagnosis Code    Nausea & vomiting R11.2    Bacteremia R78.81    CLABSI (central line-associated bloodstream infection) T80.211A    ESRD on hemodialysis ([facility] [address] N18.6, Z99.2    Fever, unspecified R50.9       Past Medical History:        Diagnosis Date    Brain aneurysm     CLABSI (central line-associated bloodstream infection) 2021    Diabetes mellitus ([facility] [address]     ESRD on hemodialysis ([facility] [address] 2021    H/O heart artery stent     Hyperlipidemia     Hypertension        Past Surgical History:        Procedure Laterality Date    CARDIAC SURGERY       SECTION         Social History:    Social History     Tobacco Use    Smoking status: Former Smoker    Smokeless tobacco: Never Used   Substance Use Topics    Alcohol use: Never                                Counseling given: Not Answered      Vital Signs (Current):   Vitals:    21 2345 21 0745 21 0925 21 1015   BP: 138/87 (!) 125/53 (!) 107/52 (!) 137/55   Pulse: 68 64 64 97   Resp: 18 16 18 16   Temp: 36.9 °C (98.4 °F) 36.4 °C (97.5 °F)  36.8 °C (98.3 °F)   TempSrc: Oral Temporal  Oral   SpO2: 96% 99% 92% 95%   Weight:       Height:                                                  BP Readings from Last 3 Encounters:   21 (!) 137/55   21 (!) 109/52   21 131/78       NPO Status:                                                                                 BMI:   Wt Readings from Last 3 Encounters:   21 247 lb 12.8 oz (112.4 kg)   21 240 lb 1.3 oz (108.9 kg)     Body mass index is 42.53 kg/m².    CBC:   Lab Results   Component Value Date    WBC 12.4 11/23/2021    RBC 2.48 11/23/2021    HGB 7.7 11/23/2021    HCT 24.2 11/23/2021    MCV 97.6 11/23/2021    RDW 13.3 11/23/2021     11/23/2021       CMP:   Lab Results   Component Value Date     11/23/2021    K 3.9 11/23/2021    K 4.6 11/19/2021    CL 92 11/23/2021    CO2 23 11/23/2021    BUN 33 11/23/2021    CREATININE 5.1 11/23/2021    GFRAA 10 11/23/2021    LABGLOM 8 11/23/2021    GLUCOSE 100 11/23/2021    PROT 6.5 11/19/2021    CALCIUM 7.8 11/23/2021    BILITOT 0.5 11/19/2021    ALKPHOS 91 11/19/2021    AST 30 11/19/2021    ALT 21 11/19/2021       POC Tests: No results for input(s): POCGLU, POCNA, POCK, POCCL, POCBUN, POCHEMO, POCHCT in the last 72 hours. Coags:   Lab Results   Component Value Date    PROTIME 13.5 11/18/2021    INR 1.2 11/18/2021       HCG (If Applicable): No results found for: PREGTESTUR, PREGSERUM, HCG, HCGQUANT     ABGs: No results found for: PHART, PO2ART, MTI1XQS, ZKF4QPG, BEART, [id number]     Type & Screen (If Applicable):  No results found for: LABABO, LABRH    Drug/Infectious Status (If Applicable):  No results found for: HIV, HEPCAB    COVID-19 Screening (If Applicable):   Lab Results   Component Value Date    COVID19 Not Detected 11/17/2021     EKG 11/17/2021  Narrative & Impression    Sinus tachycardia with occasional premature ventricular complexes  Moderate voltage criteria for LVH, may be normal variant  Borderline ECG  No previous ECGs available     CT ABDOMEN/PELVIS 11/18/2021  Narrative   EXAMINATION:   CT OF THE ABDOMEN AND PELVIS WITHOUT CONTRAST 11/17/2021 11:56 pm       TECHNIQUE:   CT of the abdomen and pelvis was performed without the administration of   intravenous contrast. Multiplanar reformatted images are provided for review. Dose modulation, iterative reconstruction, and/or weight based adjustment of   the mA/kV was utilized to reduce the radiation dose to as low as reasonably   achievable.     COMPARISON:   None.       HISTORY:   ORDERING SYSTEM PROVIDED HISTORY: Flank pain fever   TECHNOLOGIST PROVIDED HISTORY:   Reason for exam:->Flank pain fever   Additional Contrast?->None   Decision Support Exception - unselect if not a suspected or confirmed   emergency medical condition->Emergency Medical Condition (MA)   What reading provider will be dictating this exam?->CRC       FINDINGS:   Lower Chest: The visualized portions of the lung bases are clear.       Organs: Within the limitations of a noncontrast exam, the visualized liver,   spleen, pancreas, adrenal glands and kidneys demonstrate no significant   abnormality.       There is no nephrolithiasis, hydronephrosis, hydroureter or other evidence   for acute obstructive uropathy.       GI/Bowel: There are no findings of intestinal obstruction.  The appendix is   not visualized.  There is probable cholelithiasis there is mild   diverticulosis involving descending colon. Glenroy Blanc is no acute diverticulitis   seen.       Pelvis:  Bladder is unremarkable in appearance.  There is no abnormal pelvic   mass or fluid collection seen.       Peritoneum/Retroperitoneum: There is no abdominal aortic aneurysm.  There is   no abnormal lymphadenopathy seen.  There is no abnormal fluid collection. There is no free intraperitoneal air.       Bones/Soft Tissues: There is a moderate-sized fat containing umbilical   hernia. Glenroy Blanc is no acute abnormality seen involving visualized osseous   structures.           Impression   1. Glenroy Blanc is no nephrolithiasis, hydronephrosis, hydroureter or other   evidence for acute obstructive uropathy. 2. Probable cholelithiasis. 3. Mild diverticulosis.  No acute diverticulitis seen.    4. Moderate-sized fat containing umbilical hernia.               Anesthesia Evaluation  Patient summary reviewed and Nursing notes reviewed no history of anesthetic complications:   Airway: Mallampati: II  TM distance: >3 FB   Neck ROM: full  Mouth opening: > = 3 FB Dental:      Comment: Fair dentition    Pulmonary: breath sounds clear to auscultation      (-) not a current smoker                           Cardiovascular:    (+) hypertension:, CABG/stent ( x3 2017):,         Rhythm: regular  Rate: normal                 ROS comment: Normal LVF     Neuro/Psych:   (+) CVA ( 2017- no residual symptoms):,              ROS comment: Brain aneurysm GI/Hepatic/Renal:   (+) renal disease: ESRD and dialysis,          ROS comment: LD today through temporary dialysis catheter. Endo/Other:    (+) DiabetesType II DM, well controlled, using insulin, blood dyscrasia: anemia, arthritis:., .                 Abdominal:       Abdomen: soft. Vascular: Other Findings:           Anesthesia Plan      MAC     ASA 4       Induction: intravenous. Anesthetic plan and risks discussed with patient and child/children. Plan discussed with CRNA and attending. Ashley Leo RN   11/23/2021      Patient seen and examined, chart reviewed, agree with above findings. Anesthetic plan, risks, benefits, alternatives, and personnel involved discussed with patient. Patient verbalized an understanding and agreed to proceed. NPO status confirmed. Anesthetic plan discussed with care team members and agreed upon.     Eladio Gómez DO   11/24/2021  12:30 PM

## 2021-11-23 NOTE — PLAN OF CARE
Problem: Falls - Risk of:  Goal: Will remain free from falls  Description: Will remain free from falls  11/22/2021 2359 by Pauly Cr RN  Outcome: Met This Shift  11/22/2021 1514 by Lincoln Barton  Outcome: Met This Shift  Goal: Absence of physical injury  Description: Absence of physical injury  11/22/2021 2359 by Pauly Cr RN  Outcome: Met This Shift  11/22/2021 1514 by Lincoln Barton  Outcome: Met This Shift     Problem: Skin Integrity:  Goal: Will show no infection signs and symptoms  Description: Will show no infection signs and symptoms  11/22/2021 2359 by Pauly Cr RN  Outcome: Met This Shift  11/22/2021 1514 by Lincoln Barton  Outcome: Met This Shift     Problem: Pain:  Goal: Pain level will decrease  Description: Pain level will decrease  11/22/2021 2359 by Pauly Cr RN  Outcome: Met This Shift  11/22/2021 1514 by Lincoln Barton  Outcome: Met This Shift  Goal: Control of acute pain  Description: Control of acute pain  11/22/2021 2359 by Pauly Cr RN  Outcome: Met This Shift  11/22/2021 1514 by Lincoln Barton  Outcome: Met This Shift  Goal: Control of chronic pain  Description: Control of chronic pain  Outcome: Met This Shift

## 2021-11-23 NOTE — PROGRESS NOTES
[facility] MD Cosme Brooks MD Diedre Starch, MD Bessie Melia, MD  Progress Note    2021    SUBJECTIVE:    :Seen in her room she is feeling better since the cath was removed  On  . Alert oriented , cooperative      : seen in her room , will clarify with vascular and ID when HD line to be placed back      ; seen in her room , HD today 1800 cc UF . BP ok . Has temporary fem line . Once cleared by ID (repeat cultures negative ) she is to have tesio placed in     : \"I feel so well,\" but cannot be more specific. Denies pain. Denies nausea vomiting indigestion, chest pain or palpitation, shortness of breath at rest.  No swelling. No complications with dialysis. PROBLEM LIST:    Active Problems:    Nausea & vomiting    Bacteremia    CLABSI (central line-associated bloodstream infection)    ESRD on hemodialysis ([facility])    Fever, unspecified  Resolved Problems:    * No resolved hospital problems. *       DIET:    ADULT DIET; Regular; Low Sodium (2 gm); Low Potassium (Less than 3000 mg/day)  Diet NPO       Allergies : Morphine, Other, and Pcn [penicillins]    Past Medical History:   Diagnosis Date    Brain aneurysm     CLABSI (central line-associated bloodstream infection) 2021    Diabetes mellitus ([facility] [address]     ESRD on hemodialysis ([facility] [address] 2021    H/O heart artery stent     Hyperlipidemia     Hypertension        Past Surgical History:   Procedure Laterality Date    CARDIAC SURGERY       SECTION         History reviewed. No pertinent family history. reports that she has quit smoking. She has never used smokeless tobacco. She reports that she does not drink alcohol and does not use drugs.     Review of Systems:   Constitutional: no fevers , no chills , feels ok   Eyes: no eye pain , no itching , no drainage  Ears, nose, mouth, throat, and face: no ear ,nose pain , hearing is ok ,no nasal drainage   Respiratory: no sob -- -- --   11/22/21 0736 96/66 -- -- 71 -- -- --   11/22/21 0715 (!) 154/75 -- -- 69 -- -- --   11/22/21 0710 (!) 153/63 98.5 °F (36.9 °C) -- 67 -- -- 250 lb 14.1 oz (113.8 kg)   11/21/21 2345 132/78 98 °F (36.7 °C) Temporal 65 18 93 % --   11/21/21 2145 (!) 97/54 -- -- -- -- -- --   11/21/21 2030 (!) 74/40 -- -- -- -- -- --   @      Intake/Output Summary (Last 24 hours) at 11/22/2021 1905  Last data filed at 11/22/2021 1015  Gross per 24 hour   Intake 300 ml   Output 2000 ml   Net -1700 ml         Wt Readings from Last 3 Encounters:   11/22/21 247 lb 12.8 oz (112.4 kg)   09/04/21 240 lb 1.3 oz (108.9 kg)       Constitutional:  in no acute distress  Oral: mucus membranes moist  Neck: no JVD  Cardiovascular: S1, S2 regular rhythm, no murmur,or rub  Respiratory:  No crackles, no wheeze  Gastrointestinal:  Soft, nontender, nondistended, NABS  Ext: no edema, feet warm  Skin: dry, no rash  Neuro: awake, alert, interactive      DATA:    Recent Labs     11/21/21  0501   WBC 12.0*   HGB 9.1*   HCT 27.9*   .4*        Recent Labs     11/21/21  0501      K 3.9   CL 94*   CO2 21*   BUN 48*   CREATININE 5.2*       No results found for: LABPROT    ASSESSMENT       1. End-stage renal disease, on hemodialysis thrice weekly. 2.  Gram-positive cocci bacteremia/MSSA on Ancef per ID  3. Anemia of chronic disease. 4.  Hypertension. RECOMMENDATIONS  Abx per ID service   or PENELOPE   Consideration for MRI spine . Would avoid gadolinum .    If deemed clinically necessary to give gadolinium then will plan dialysis for three days in a row (to start within 1-2 hours of receiving gadolinium )  Continue IHD support for solute and volume clearance per holiday schedule this week, Sunday, Tuesday, Thursday, Friday    Electronically signed by Yee Broderick MD on 11/22/2021

## 2021-11-23 NOTE — CARE COORDINATION
Care Coordination  The plan is for the patient to be npo after mn   for a insertion of a tunneled hemodialysis cath and removal of the temporary Catheter in the am and is npo after mn . Joe Carlos will be done today await final results. . The patient is feeling much better. She gets ancef  1 gm on hemo on T_TH_[place] time is 545 am @[facility] at [address] [place] [address]. Her plan is to return to home once she is medically stable and her daughter in her ride home. Pt Darryl meneses pending.  I will follow

## 2021-11-23 NOTE — PROGRESS NOTES
Vascular Surgery Progress Note    CC: ESRD requiring HD    HISTORY:  The patient is awake, alert, and oriented. Sitting up in chair. States she will occasionally get groin discomfort with certain movements but overall feels well. IMPRESSION:  CLABSI, s/p removal of tdc 11/18, POD # 3 s/p insertion of temporary hemodialysis catheter    PLAN:    Continue to use temporary catheter for hemodialysis as needed   Will plan for insertion of tunneled hd catheter, removal of temporary tomorrow   The procedure as well as its indication, risks, benefits, and alternatives were discussed with the patient and available family members. All questions answered. They would like to proceed with intervention.    NPO after midnight    Patient Active Problem List   Diagnosis Code    Nausea & vomiting R11.2    Bacteremia R78.81    CLABSI (central line-associated bloodstream infection) T80.211A    ESRD on hemodialysis ([facility] [address] N18.6, Z99.2    Fever, unspecified R50.9       Current Medications:    sodium chloride      dextrose        simethicone, HYDROcodone-acetaminophen, cyclobenzaprine, sodium chloride flush, sodium chloride, promethazine **OR** ondansetron, polyethylene glycol, acetaminophen **OR** acetaminophen, glucose, dextrose, glucagon (rDNA), dextrose, trimethobenzamide    [START ON 11/24/2021] ceFAZolin  3,000 mg IntraVENous Once    [START ON 12/2/2021] ceFAZolin  3,000 mg IntraVENous Weekly - Thursday    [START ON 11/27/2021] ceFAZolin  2,000 mg IntraVENous Once per day on Tue Sat    aspirin  81 mg Oral Daily    atorvastatin  40 mg Oral Daily    carvedilol  12.5 mg Oral BID WC    clopidogrel  75 mg Oral Daily    insulin lispro  3 Units SubCUTAneous TID AC    insulin glargine  20 Units SubCUTAneous Daily    sodium chloride flush  10 mL IntraVENous 2 times per day    heparin (porcine)  5,000 Units SubCUTAneous 3 times per day          PHYSICAL EXAM:    Vitals:    11/23/21 1015   BP: (!) 137/55   Pulse: 97

## 2021-11-23 NOTE — PROGRESS NOTES
Hospitalist Progress Note      SYNOPSIS: Patient admitted on 2021 for nausea and fever. Blood cultures were positive for Staph aureus. Vascular surgery was consulted for removal of tunneled dialysis catheter. Serial blood cultures are still remain positive for staph aureus. Vascular surgery also on board. ID also on board. SUBJECTIVE:    Patient seen and examined. She had PENELOPE today, and was seen after the procedure. She had no complaints, apart from feeling hungry. Her back pain had largely resolved. Review of systems is otherwise negative. Records reviewed. Temp (24hrs), Av.2 °F (36.8 °C), Min:97.5 °F (36.4 °C), Max:98.6 °F (37 °C)    DIET: ADULT DIET; Regular; 4 carb choices (60 gm/meal); Low Fat/Low Chol/High Fiber/CHELSEA; Low Sodium (2 gm)  Diet NPO  CODE: Full Code    Intake/Output Summary (Last 24 hours) at 2021 1323  Last data filed at 2021 0919  Gross per 24 hour   Intake 50 ml   Output --   Net 50 ml       OBJECTIVE:    BP (!) 137/55   Pulse 97   Temp 98.3 °F (36.8 °C) (Oral)   Resp 16   Ht 5' 4\" (1.626 m)   Wt 247 lb 12.8 oz (112.4 kg)   SpO2 95%   BMI 42.53 kg/m²     General appearance: No apparent distress, appears stated age and cooperative. HEENT:  Conjunctivae/corneas clear. Neck: Supple. No jugular venous distention. Respiratory: Clear to auscultation bilaterally, normal respiratory effort  Cardiovascular: Regular rate rhythm, normal S1-S2  Abdomen: Soft, nontender, nondistended  Musculoskeletal: No clubbing, cyanosis, no bilateral lower extremity edema. Brisk capillary refill. Skin:  No rashes  on visible skin  Neurologic: awake, alert and following commands     ASSESSMENT:  #Staph aureus bacteremia  -repeat blood cultures still positive for Staph  -On IV ancef  -ID on board  -Dialysis catheter was removed. Now has temporary dialysis catheter  -had PENELOPE today which was negative for any evidence of endocarditis     #Back pain  -has resolved. #ESRD on HD  -nephrology on board  -Tunneled dialysis catheter removed and now has temporary dialysis catheter in place  -having regular dialysis. Had dialysis yesterday.      #Type 2 diabetes mellitus  -on ISS. -On Lantus. -Accuchecks ACHS     #Anemia of chronic disease:  - Hb today is 7.7, down from 9 yesterday.   -transfuse if Hb <7      #CAD: On aspirin and statin as well as Plavix     DVT prophylaxis: Heparin       DISPOSITION: to be determined    Medications:  REVIEWED DAILY    Infusion Medications    sodium chloride      dextrose       Scheduled Medications    [START ON 11/24/2021] ceFAZolin  3,000 mg IntraVENous Once    [START ON 12/2/2021] ceFAZolin  3,000 mg IntraVENous Weekly - Thursday    [START ON 11/27/2021] ceFAZolin  2,000 mg IntraVENous Once per day on Tue Sat    aspirin  81 mg Oral Daily    atorvastatin  40 mg Oral Daily    carvedilol  12.5 mg Oral BID WC    clopidogrel  75 mg Oral Daily    insulin lispro  3 Units SubCUTAneous TID AC    insulin glargine  20 Units SubCUTAneous Daily    sodium chloride flush  10 mL IntraVENous 2 times per day    heparin (porcine)  5,000 Units SubCUTAneous 3 times per day     PRN Meds: simethicone, HYDROcodone-acetaminophen, cyclobenzaprine, sodium chloride flush, sodium chloride, promethazine **OR** ondansetron, polyethylene glycol, acetaminophen **OR** acetaminophen, glucose, dextrose, glucagon (rDNA), dextrose, trimethobenzamide    Labs:     Recent Labs     11/21/21  0501 11/23/21  0526   WBC 12.0* 12.4*   HGB 9.1* 7.7*   HCT 27.9* 24.2*    171       Recent Labs     11/21/21  0501 11/23/21  0526    130*   K 3.9 3.9   CL 94* 92*   CO2 21* 23   BUN 48* 33*   CREATININE 5.2* 5.1*   CALCIUM 7.5* 7.8*   PHOS  --  3.6       No results for input(s): PROT, ALB, ALKPHOS, ALT, AST, BILITOT, AMYLASE, LIPASE in the last 72 hours. No results for input(s): INR in the last 72 hours.     No results for input(s): Genaro Gey in the last 72 hours.    Chronic labs:    Lab Results   Component Value Date    INR 1.2 11/18/2021       Radiology: REVIEWED DAILY    +++++++++++++++++++++++++++++++++++++++++++++++++  MD Angela Banuelos Physician - [address] [place] [address]  +++++++++++++++++++++++++++++++++++++++++++++++++  NOTE: This report was transcribed using voice recognition software. Every effort was made to ensure accuracy; however, inadvertent computerized transcription errors may be present.

## 2021-11-23 NOTE — OP NOTE
Preliminary PENELOPE/Operative Note      Patient: Mason Coker  YOB: 1955  MRN: [id number]    Date of Procedure: 11/19/2021      Brief PENELOPE Note    Pre-operative Diagnosis: Bacteremia, R/O Endocarditis    Post-operative Diagnosis: No PENELOPE indication of endocarditis;  Mild MR, TR, and AV sclerosis    Procedure: PENELOPE    Anesthesia: [facility]    Surgeons/Assistants: Dr Emmanuelle Marx    Complications: None    Full report to follow in [facility]      Electronically signed by Kim Ramos MD on 11/23/2021 at 9:43 AM

## 2021-11-23 NOTE — PROGRESS NOTES
[address] [place] [address] [facility]  [facility]  Progress Note    CC: MSSA bacteremia   Face to face encounter   SUBJECTIVE:  Patient is tolerating medications. No reported adverse drug reactions. ROS: No nausea, vomiting, diarrhea. No fevers. On 5 liters nasal canula. Resting in bed. Little back pain. Feels \"stiff\".     Medications:  Scheduled Meds:   [START ON 11/24/2021] ceFAZolin  3,000 mg IntraVENous Once    [START ON 12/2/2021] ceFAZolin  3,000 mg IntraVENous Weekly - Thursday    [START ON 11/27/2021] ceFAZolin  2,000 mg IntraVENous Once per day on Tue Sat    aspirin  81 mg Oral Daily    atorvastatin  40 mg Oral Daily    carvedilol  12.5 mg Oral BID WC    clopidogrel  75 mg Oral Daily    insulin lispro  3 Units SubCUTAneous TID AC    insulin glargine  20 Units SubCUTAneous Daily    sodium chloride flush  10 mL IntraVENous 2 times per day    heparin (porcine)  5,000 Units SubCUTAneous 3 times per day     Continuous Infusions:   sodium chloride      dextrose       PRN Meds:simethicone, HYDROcodone-acetaminophen, cyclobenzaprine, sodium chloride flush, sodium chloride, promethazine **OR** ondansetron, polyethylene glycol, acetaminophen **OR** acetaminophen, glucose, dextrose, glucagon (rDNA), dextrose, trimethobenzamide  OBJECTIVE:  Patient Vitals for the past 24 hrs:   BP Temp Temp src Pulse Resp SpO2 Weight   11/22/21 2345 138/87 98.4 °F (36.9 °C) Oral 68 18 96 % --   11/22/21 2015 (!) 141/92 98.6 °F (37 °C) Oral 71 18 96 % --   11/22/21 1630 (!) 133/56 -- -- 70 -- -- --   11/22/21 1415 (!) 135/54 98.1 °F (36.7 °C) Oral 77 18 -- --   11/22/21 1045 (!) 133/52 98.2 °F (36.8 °C) Oral 82 18 93 % --   11/22/21 1015 (!) 168/74 98.5 °F (36.9 °C) -- 61 -- -- 247 lb 12.8 oz (112.4 kg)   11/22/21 1003 (!) 141/55 -- -- 63 -- -- --   11/22/21 0934 (!) 149/58 -- -- 73 -- -- --   11/22/21 0905 (!) 148/53 -- -- 66 -- -- --   11/22/21 0835 (!) 131/51 -- -- 63 -- -- --   11/22/21 0806 (!) 135/54 -- -- 64 -- -- --     Constitutional: The patient is awake, alert, and oriented. Skin: Warm and dry. No rashes were noted. Head: Eyes show round, and reactive pupils. No jaundice. Mouth: Moist mucous membranes, no ulcerations, no thrush. Neck: Supple to movements. No lymphadenopathy. Chest: No use of accessory muscles to breathe. Symmetrical expansion. Auscultation reveals no wheezing, crackles, or rhonchi. Cardiovascular: S1 and S2 are rhythmic and regular. Abdomen: Positive bowel sounds to auscultation. Extremities: No clubbing, no cyanosis, some edema. Has tubi- on to lower extremities   PIV  Right fem HD temp line placed    Laboratory and Tests Review:  Lab Results   Component Value Date    WBC 12.4 (H) 11/23/2021    WBC 12.0 (H) 11/21/2021    WBC 13.3 (H) 11/19/2021    HGB 7.7 (L) 11/23/2021    HCT 24.2 (L) 11/23/2021    MCV 97.6 11/23/2021     11/23/2021     Lab Results   Component Value Date    NEUTROABS 9.39 (H) 11/21/2021    NEUTROABS 11.40 (H) 11/19/2021    NEUTROABS 13.34 (H) 11/17/2021     No results found for: CRP  Lab Results   Component Value Date    SEDRATE 103 (H) 11/21/2021     Lab Results   Component Value Date    ALT 21 11/19/2021    AST 30 11/19/2021    ALKPHOS 91 11/19/2021    BILITOT 0.5 11/19/2021     Lab Results   Component Value Date     11/23/2021    K 3.9 11/23/2021    K 4.6 11/19/2021    CL 92 11/23/2021    CO2 23 11/23/2021    BUN 33 11/23/2021    CREATININE 5.1 11/23/2021    GFRAA 10 11/23/2021    LABGLOM 8 11/23/2021    GLUCOSE 100 11/23/2021    PROT 6.5 11/19/2021    LABALBU 3.5 11/19/2021    CALCIUM 7.8 11/23/2021    BILITOT 0.5 11/19/2021    ALKPHOS 91 11/19/2021    AST 30 11/19/2021    ALT 21 11/19/2021     Radiology:  CT  Impression:        1. There is no nephrolithiasis, hydronephrosis, hydroureter or other   evidence for acute obstructive uropathy. 2. Probable cholelithiasis. 3. Mild diverticulosis. No acute diverticulitis seen.    4. Moderate-sized fat containing umbilical hernia     Microbiology:   11/17/2021- blood cx- staph aureus - MSSA  11/18/2021- blood cx- staph aureus   11/19/2021- blood cx- Staph aureus     11/21/2021- blood cx- NGTD  11/22/21 blood cx NGTD  11/19/2021- tip cx- ++ > 15 colonies staph aureus     ASSESSMENT:  · MSSA bacteremia    · S/P HD cath removal- CLABSI  · Leukocytosis   · ESRD- HD   · S/p temp HD line placed to right groin   · Back pain-Rule out discitis vertebral osteo-    PLAN:  · Continue Ancef- 2 grams -2 grams- 3 grams- has been ordered for 4 weeks  · Med rec complete  · Check cultures  · PENELOPE-No PENELOPE indication of endocarditis;  Mild MR, TR, and AV sclerosis  · Monitor labs- repeat serial blood cultures ordered   · I11/21/2021 blood cultures remain negative ok for chronic tunneled catheter placed on Wednesday  · Back pain muscle related    Leocadia Cancel, APRN - CNP  7:50 AM     Pt seen and examined. Above discussed agree with advanced practice nurse. Labs, cultures, and radiographs reviewed. Face to Face encounter occurred. Changes made as necessary.      Diane Potts MD

## 2021-11-23 NOTE — PROCEDURES
Victoriano Dessert was performed by Dr Emmanuelle Marx with [employer] WINTER assisting  No bubble study done

## 2021-11-23 NOTE — PROGRESS NOTES
Occupational Therapy  OCCUPATIONAL THERAPY INITIAL EVALUATION     Ava Mixbook Jacy Monacoheavenly [address] [place], [place]      FANJ:                                                Patient Name: Dennise Kwok  MRN: [id number]  : 1955  Room: 6023/0349-[address]    Referring Provider: Kellen Luevano MD  Specific Provider Orders/Date: OT eval and treat 21    Diagnosis: Nausea & vomiting [R11.2]  Fever, unspecified [R50.9]  Bacteremia [R78.81]   Pt admitted to hospital on 21 for fever, chills, n/v    Surgery / Procedure: PENELOPE    REMOVAL TUNNELED DIALYSIS CATHETER on , temporary HD catheter 21    Pertinent Medical History:  has a past medical history of Brain aneurysm, CLABSI (central line-associated bloodstream infection), Diabetes mellitus ([facility] [address] ESRD on hemodialysis ([facility] [address] H/O heart artery stent, Hyperlipidemia, and Hypertension.        Precautions:  Fall Risk, O2, HD T-Th-S    Assessment of current deficits    [x] Functional mobility  [x]ADLs  [x] Strength               []Cognition    [x] Functional transfers   [x] IADLs         [x] Safety Awareness   [x]Endurance    [] Fine Coordination              [x] Balance      [] Vision/perception   []Sensation     []Gross Motor Coordination  [] ROM  [] Delirium                   [] Motor Control     OT PLAN OF CARE   OT POC based on physician orders, patient diagnosis and results of clinical assessment    Frequency/Duration 1-3 days/wk for 2 weeks PRN   Specific OT Treatment Interventions to include:   * Instruction/training on adapted ADL techniques and AE recommendations to increase functional independence within precautions       * Training on energy conservation strategies, correct breathing pattern and techniques to improve independence/tolerance for self-care routine  * Functional transfer/mobility training/DME recommendations for increased NT  Pt declined w/ verbal encouragement and education d/t increased fatigue following procedure in early AM  Stand by Assist    Balance Sitting:     Static:  SBA    Dynamic:Min A  Standing: Min A w/ HHA     Activity Tolerance Poor+  C/o increased fatigue w/ minimal activity  Fair+   Visual/  Perceptual Glasses: yes  cataracts                  Hand Dominance R   AROM (PROM) Strength Additional Info:    RUE  WFL Distal: 3+/5 good  and wfl FMC/dexterity noted during ADL tasks       LUE WFL Distal: 3+/5 good  and wfl FMC/dexterity noted during ADL tasks       Hearing: WFL  Sensation:  No c/o numbness or tingling   Tone: WFL   Edema: none noted    Comments: Upon arrival patient seated in chair. Pt agreeable to OT session this date w/ encouragement/education. Therapist educated pt on role of OT. At end of session, patient seated in chair with call light and phone within reach, all lines and tubes intact. Overall patient demonstrated decreased independence and safety during completion of ADL/functional transfer/mobility tasks. Pt would benefit from continued skilled OT to increase safety and independence with completion of ADL/IADL tasks for functional independence and quality of life. Treatment: OT treatment provided this date includes: Facilitation of sitting balance (addressing posture, weight shifting and safety to prep for ADL's), functional transfers (w/ education/cues for safety/hand placement and attention to task) and standing tolerance tasks (addressing posture, balance and activity tolerance impacting ADLs and functional activity). Therapist facilitated self-care retraining: UB/LB self-care tasks while educating/cuing pt on modified techniques, posture, safety and energy conservation techniques. Skilled monitoring of HR, O2 sats and pts response to treatment. Pt on O2 (5L) via nasal cannula. O2 sat=^93% at rest and w/ activity.     Rehab Potential: Good for established goals     Patient / Family Goal: to return home w/ daughter      Patient and/or family were instructed on functional diagnosis, prognosis/goals and OT plan of care. Demonstrated fair understanding. Eval Complexity: Low    Time In: 11:04  Time Out: 11:20      Min Units   OT Eval Low 97165  X  1   OT Eval Medium 44034      OT Eval High 76850      OT Re-Eval [id number]       Therapeutic Ex 67077       Therapeutic Activities 81346       ADL/Self Care 17935       Orthotic Management 57720       Manual [id number]     Neuro Re-Ed 26922       Non-Billable Time          Evaluation Time additionally includes thorough review of current medical information, gathering information on past medical history/social history and prior level of function, interpretation of standardized testing/informal observation of tasks, assessment of data and development of plan of care and goals.         [place] OTR/L #[id number]

## 2021-11-24 ENCOUNTER — APPOINTMENT (OUTPATIENT)
Dept: GENERAL RADIOLOGY | Age: 66
DRG: 314 | End: 2021-11-24
Payer: MEDICARE

## 2021-11-24 ENCOUNTER — ANESTHESIA (OUTPATIENT)
Dept: OPERATING ROOM | Age: 66
DRG: 314 | End: 2021-11-24
Payer: MEDICARE

## 2021-11-24 VITALS — OXYGEN SATURATION: 99 % | DIASTOLIC BLOOD PRESSURE: 83 MMHG | SYSTOLIC BLOOD PRESSURE: 111 MMHG

## 2021-11-24 LAB
ANION GAP SERPL CALCULATED.3IONS-SCNC: 16 MMOL/L (ref 7–16)
BUN BLDV-MCNC: 50 MG/DL (ref 6–23)
CALCIUM SERPL-MCNC: 7.6 MG/DL (ref 8.6–10.2)
CHLORIDE BLD-SCNC: 94 MMOL/L (ref 98–107)
CO2: 24 MMOL/L (ref 22–29)
CREAT SERPL-MCNC: 7.1 MG/DL (ref 0.5–1)
GFR AFRICAN AMERICAN: 7
GFR NON-AFRICAN AMERICAN: 6 ML/MIN/1.73
GLUCOSE BLD-MCNC: 156 MG/DL (ref 74–99)
HCT VFR BLD CALC: 23.8 % (ref 34–48)
HEMOGLOBIN: 7.6 G/DL (ref 11.5–15.5)
MAGNESIUM: 2.2 MG/DL (ref 1.6–2.6)
MCH RBC QN AUTO: 32.1 PG (ref 26–35)
MCHC RBC AUTO-ENTMCNC: 31.9 % (ref 32–34.5)
MCV RBC AUTO: 100.4 FL (ref 80–99.9)
METER GLUCOSE: 121 MG/DL (ref 74–99)
METER GLUCOSE: 125 MG/DL (ref 74–99)
METER GLUCOSE: 143 MG/DL (ref 74–99)
METER GLUCOSE: 176 MG/DL (ref 74–99)
PDW BLD-RTO: 13.4 FL (ref 11.5–15)
PHOSPHORUS: 5.6 MG/DL (ref 2.5–4.5)
PLATELET # BLD: 175 E9/L (ref 130–450)
PMV BLD AUTO: 10.7 FL (ref 7–12)
POTASSIUM SERPL-SCNC: 4.1 MMOL/L (ref 3.5–5)
RBC # BLD: 2.37 E12/L (ref 3.5–5.5)
SODIUM BLD-SCNC: 134 MMOL/L (ref 132–146)
WBC # BLD: 9.8 E9/L (ref 4.5–11.5)

## 2021-11-24 PROCEDURE — 2700000000 HC OXYGEN THERAPY PER DAY

## 2021-11-24 PROCEDURE — 84100 ASSAY OF PHOSPHORUS: CPT

## 2021-11-24 PROCEDURE — 1200000000 HC SEMI PRIVATE

## 2021-11-24 PROCEDURE — 6360000002 HC RX W HCPCS: Performed by: NURSE PRACTITIONER

## 2021-11-24 PROCEDURE — 2580000003 HC RX 258: Performed by: NURSE ANESTHETIST, CERTIFIED REGISTERED

## 2021-11-24 PROCEDURE — 2580000003 HC RX 258: Performed by: NURSE PRACTITIONER

## 2021-11-24 PROCEDURE — 71045 X-RAY EXAM CHEST 1 VIEW: CPT

## 2021-11-24 PROCEDURE — 6370000000 HC RX 637 (ALT 250 FOR IP): Performed by: INTERNAL MEDICINE

## 2021-11-24 PROCEDURE — 2709999900 HC NON-CHARGEABLE SUPPLY: Performed by: SURGERY

## 2021-11-24 PROCEDURE — 3700000001 HC ADD 15 MINUTES (ANESTHESIA): Performed by: SURGERY

## 2021-11-24 PROCEDURE — 7100000001 HC PACU RECOVERY - ADDTL 15 MIN: Performed by: SURGERY

## 2021-11-24 PROCEDURE — 82962 GLUCOSE BLOOD TEST: CPT

## 2021-11-24 PROCEDURE — 6360000002 HC RX W HCPCS: Performed by: SURGERY

## 2021-11-24 PROCEDURE — 02PY33Z REMOVAL OF INFUSION DEVICE FROM GREAT VESSEL, PERCUTANEOUS APPROACH: ICD-10-PCS | Performed by: SURGERY

## 2021-11-24 PROCEDURE — 6360000002 HC RX W HCPCS: Performed by: PHYSICIAN ASSISTANT

## 2021-11-24 PROCEDURE — 3600000013 HC SURGERY LEVEL 3 ADDTL 15MIN: Performed by: SURGERY

## 2021-11-24 PROCEDURE — 6360000002 HC RX W HCPCS: Performed by: NURSE ANESTHETIST, CERTIFIED REGISTERED

## 2021-11-24 PROCEDURE — 36556 INSERT NON-TUNNEL CV CATH: CPT | Performed by: SURGERY

## 2021-11-24 PROCEDURE — 3600000003 HC SURGERY LEVEL 3 BASE: Performed by: SURGERY

## 2021-11-24 PROCEDURE — 6370000000 HC RX 637 (ALT 250 FOR IP): Performed by: PHYSICIAN ASSISTANT

## 2021-11-24 PROCEDURE — 80048 BASIC METABOLIC PNL TOTAL CA: CPT

## 2021-11-24 PROCEDURE — 7100000000 HC PACU RECOVERY - FIRST 15 MIN: Performed by: SURGERY

## 2021-11-24 PROCEDURE — 2580000003 HC RX 258: Performed by: SURGERY

## 2021-11-24 PROCEDURE — 90935 HEMODIALYSIS ONE EVALUATION: CPT

## 2021-11-24 PROCEDURE — 2500000003 HC RX 250 WO HCPCS: Performed by: SURGERY

## 2021-11-24 PROCEDURE — 85027 COMPLETE CBC AUTOMATED: CPT

## 2021-11-24 PROCEDURE — 3700000000 HC ANESTHESIA ATTENDED CARE: Performed by: SURGERY

## 2021-11-24 PROCEDURE — 77001 FLUOROGUIDE FOR VEIN DEVICE: CPT | Performed by: SURGERY

## 2021-11-24 PROCEDURE — 83735 ASSAY OF MAGNESIUM: CPT

## 2021-11-24 PROCEDURE — 2580000003 HC RX 258: Performed by: PHYSICIAN ASSISTANT

## 2021-11-24 PROCEDURE — 0JPT3XZ REMOVAL OF TUNNELED VASCULAR ACCESS DEVICE FROM TRUNK SUBCUTANEOUS TISSUE AND FASCIA, PERCUTANEOUS APPROACH: ICD-10-PCS | Performed by: SURGERY

## 2021-11-24 PROCEDURE — C1881 DIALYSIS ACCESS SYSTEM: HCPCS | Performed by: SURGERY

## 2021-11-24 PROCEDURE — 36415 COLL VENOUS BLD VENIPUNCTURE: CPT

## 2021-11-24 RX ORDER — FENTANYL CITRATE 50 UG/ML
50 INJECTION, SOLUTION INTRAMUSCULAR; INTRAVENOUS EVERY 5 MIN PRN
Status: DISCONTINUED | OUTPATIENT
Start: 2021-11-24 | End: 2021-11-24 | Stop reason: HOSPADM

## 2021-11-24 RX ORDER — FENTANYL CITRATE 50 UG/ML
INJECTION, SOLUTION INTRAMUSCULAR; INTRAVENOUS PRN
Status: DISCONTINUED | OUTPATIENT
Start: 2021-11-24 | End: 2021-11-24 | Stop reason: SDUPTHER

## 2021-11-24 RX ORDER — HEPARIN SODIUM (PORCINE) LOCK FLUSH IV SOLN 100 UNIT/ML 100 UNIT/ML
SOLUTION INTRAVENOUS PRN
Status: DISCONTINUED | OUTPATIENT
Start: 2021-11-24 | End: 2021-11-24 | Stop reason: ALTCHOICE

## 2021-11-24 RX ORDER — FENTANYL CITRATE 50 UG/ML
25 INJECTION, SOLUTION INTRAMUSCULAR; INTRAVENOUS EVERY 5 MIN PRN
Status: DISCONTINUED | OUTPATIENT
Start: 2021-11-24 | End: 2021-11-24 | Stop reason: HOSPADM

## 2021-11-24 RX ORDER — SODIUM CHLORIDE 9 MG/ML
INJECTION, SOLUTION INTRAVENOUS CONTINUOUS PRN
Status: DISCONTINUED | OUTPATIENT
Start: 2021-11-24 | End: 2021-11-24 | Stop reason: SDUPTHER

## 2021-11-24 RX ORDER — ONDANSETRON 2 MG/ML
4 INJECTION INTRAMUSCULAR; INTRAVENOUS
Status: DISCONTINUED | OUTPATIENT
Start: 2021-11-24 | End: 2021-11-24 | Stop reason: HOSPADM

## 2021-11-24 RX ORDER — PROPOFOL 10 MG/ML
INJECTION, EMULSION INTRAVENOUS PRN
Status: DISCONTINUED | OUTPATIENT
Start: 2021-11-24 | End: 2021-11-24 | Stop reason: SDUPTHER

## 2021-11-24 RX ORDER — MIDAZOLAM HYDROCHLORIDE 1 MG/ML
INJECTION INTRAMUSCULAR; INTRAVENOUS PRN
Status: DISCONTINUED | OUTPATIENT
Start: 2021-11-24 | End: 2021-11-24 | Stop reason: SDUPTHER

## 2021-11-24 RX ADMIN — CYCLOBENZAPRINE 5 MG: 10 TABLET, FILM COATED ORAL at 00:53

## 2021-11-24 RX ADMIN — HEPARIN SODIUM 5000 UNITS: 10000 INJECTION INTRAVENOUS; SUBCUTANEOUS at 20:44

## 2021-11-24 RX ADMIN — PROPOFOL 50 MCG/KG/MIN: 10 INJECTION, EMULSION INTRAVENOUS at 13:03

## 2021-11-24 RX ADMIN — Medication 10 ML: at 20:45

## 2021-11-24 RX ADMIN — FENTANYL CITRATE 50 MCG: 50 INJECTION, SOLUTION INTRAMUSCULAR; INTRAVENOUS at 13:03

## 2021-11-24 RX ADMIN — MIDAZOLAM 1 MG: 1 INJECTION INTRAMUSCULAR; INTRAVENOUS at 12:57

## 2021-11-24 RX ADMIN — FENTANYL CITRATE 50 MCG: 50 INJECTION, SOLUTION INTRAMUSCULAR; INTRAVENOUS at 13:11

## 2021-11-24 RX ADMIN — CEFAZOLIN SODIUM 3000 MG: 10 INJECTION, POWDER, FOR SOLUTION INTRAVENOUS at 15:15

## 2021-11-24 RX ADMIN — CARVEDILOL 12.5 MG: 6.25 TABLET, FILM COATED ORAL at 16:04

## 2021-11-24 RX ADMIN — MIDAZOLAM 1 MG: 1 INJECTION INTRAMUSCULAR; INTRAVENOUS at 12:52

## 2021-11-24 RX ADMIN — SODIUM CHLORIDE: 9 INJECTION, SOLUTION INTRAVENOUS at 12:52

## 2021-11-24 RX ADMIN — HYDROCODONE BITARTRATE AND ACETAMINOPHEN 1 TABLET: 5; 325 TABLET ORAL at 16:03

## 2021-11-24 ASSESSMENT — PAIN SCALES - GENERAL
PAINLEVEL_OUTOF10: 8
PAINLEVEL_OUTOF10: 0
PAINLEVEL_OUTOF10: 6
PAINLEVEL_OUTOF10: 9
PAINLEVEL_OUTOF10: 8

## 2021-11-24 ASSESSMENT — PULMONARY FUNCTION TESTS: PIF_VALUE: 0

## 2021-11-24 NOTE — PROGRESS NOTES
Physical Therapy      Facility/Department: [facility]      NAME: Lane Brown  : 1955  MRN: [id number]    Date of Service: 2021    Attempted to Evaluate pt 2x. Pt off floor for dialysis this AM, remains off the floor for procedure this PM. Will attempt again as schedule allows. Addendum: attempted a third time after pt returned from OR, nursing stating orders for bedrest until 1844 today. Will attempt again on later date.     Rajesh Hammond, PT, DPT  [id number]

## 2021-11-24 NOTE — PROGRESS NOTES
Hospitalist Progress Note      SYNOPSIS:Patient admitted on 2021 for nausea and fever.  Blood cultures were positive for Staph aureus.  Vascular surgery was consulted for removal of tunneled dialysis catheter.  Serial blood cultures are still remain positive for staph aureus.  Vascular surgery also on board.  ID also on board. SUBJECTIVE:    Patient seen and examined in her room. Denies any chest pain, nausea, vomiting. No major overnight events. Records reviewed. Stable overnight. No other overnight issues reported. Temp (24hrs), Av.2 °F (36.8 °C), Min:97.9 °F (36.6 °C), Max:98.5 °F (36.9 °C)    DIET: Diet NPO  CODE: Full Code    Intake/Output Summary (Last 24 hours) at 2021 0913  Last data filed at 2021 0919  Gross per 24 hour   Intake 50 ml   Output --   Net 50 ml       OBJECTIVE:    BP (!) 156/70   Pulse 62   Temp 97.9 °F (36.6 °C)   Resp 16   Ht 5' 4\" (1.626 m)   Wt 251 lb 1.7 oz (113.9 kg)   SpO2 97%   BMI 43.10 kg/m²     General appearance: No apparent distress, appears stated age and cooperative. HEENT:  Conjunctivae/corneas clear. Neck: Supple. No jugular venous distention. Respiratory: Clear to auscultation bilaterally, normal respiratory effort  Cardiovascular: Regular rate rhythm, normal S1-S2  Abdomen: Soft, nontender, nondistended  Musculoskeletal: No clubbing, cyanosis, no bilateral lower extremity edema. Brisk capillary refill.    Skin:  No rashes  on visible skin  Neurologic: awake, alert and following commands     ASSESSMENT & PLAN:    #Staph aureus bacteremia  -repeat blood cultures still positive for Staph  -On IV ancef  -ID on board  -Dialysis catheter was removed.  Now has temporary dialysis catheter  -had PENELOPE on  which was negative for any evidence of endocarditis     #Back pain  -has resolved.      #ESRD on HD  -nephrology on board  -Tunneled dialysis catheter removed and now has temporary dialysis catheter in place  -having regular dialysis.     #Type 2 diabetes mellitus  -on ISS. -On Lantus. -Accuchecks ACHS     #Anemia of chronic disease:  - Hb today is 7.7, down from 9 yesterday.   -transfuse if Hb <7      #CAD: On aspirin and statin as well as Plavix     DVT prophylaxis: Heparin    DISPOSITION:   Unclear discharge disposition at the moment. Medications:  REVIEWED DAILY    Infusion Medications    sodium chloride      dextrose       Scheduled Medications    ceFAZolin  3,000 mg IntraVENous Once    [START ON 12/2/2021] ceFAZolin  3,000 mg IntraVENous Weekly - Thursday    [START ON 11/27/2021] ceFAZolin  2,000 mg IntraVENous Once per day on Tue Sat    aspirin  81 mg Oral Daily    atorvastatin  40 mg Oral Daily    carvedilol  12.5 mg Oral BID WC    clopidogrel  75 mg Oral Daily    insulin lispro  3 Units SubCUTAneous TID AC    insulin glargine  20 Units SubCUTAneous Daily    sodium chloride flush  10 mL IntraVENous 2 times per day    heparin (porcine)  5,000 Units SubCUTAneous 3 times per day     PRN Meds: simethicone, HYDROcodone-acetaminophen, cyclobenzaprine, sodium chloride flush, sodium chloride, promethazine **OR** ondansetron, polyethylene glycol, acetaminophen **OR** acetaminophen, glucose, dextrose, glucagon (rDNA), dextrose, trimethobenzamide    Labs:     Recent Labs     11/23/21  0526 11/24/21  0710   WBC 12.4* 9.8   HGB 7.7* 7.6*   HCT 24.2* 23.8*    175       Recent Labs     11/23/21  0526 11/24/21  0710   * 134   K 3.9 4.1   CL 92* 94*   CO2 23 24   BUN 33* 50*   CREATININE 5.1* 7.1*   CALCIUM 7.8* 7.6*   PHOS 3.6 5.6*       No results for input(s): PROT, ALB, ALKPHOS, ALT, AST, BILITOT, AMYLASE, LIPASE in the last 72 hours. No results for input(s): INR in the last 72 hours. No results for input(s): Cherre Gash in the last 72 hours.     Chronic labs:    Lab Results   Component Value Date    INR 1.2 11/18/2021       Radiology: REVIEWED DAILY    +++++++++++++++++++++++++++++++++++++++++++++++++  Collette Manning MD  [address]  +++++++++++++++++++++++++++++++++++++++++++++++++  NOTE: This report was transcribed using voice recognition software. Every effort was made to ensure accuracy; however, inadvertent computerized transcription errors may be present.

## 2021-11-24 NOTE — ANESTHESIA POSTPROCEDURE EVALUATION
Department of Anesthesiology  Postprocedure Note    Patient: Royal Bauer  MRN: [id number]  YOB: 1955  Date of evaluation: 11/24/2021  Time:  5:44 PM     Procedure Summary     Date: 11/24/21 Room / Location: [facility] OR 03 / [facility]    Anesthesia Start: 1252 Anesthesia Stop: 9027    Procedure: CATHETER INSERTION  TUNNELED HEMODIALYSIS, WITH REMOVAL OF TEMPORARY CATHETER (N/A ) Diagnosis: (/)    Surgeons: Gely Trevino MD Responsible Provider: Yennifer Martinez DO    Anesthesia Type: MAC ASA Status: 4          Anesthesia Type: MAC    Gloria Phase I: Gloria Score: 8    Gloria Phase II:      Last vitals: Reviewed and per EMR flowsheets.        Anesthesia Post Evaluation    Patient location during evaluation: PACU  Patient participation: complete - patient participated  Level of consciousness: awake and alert  Pain score: 1  Airway patency: patent  Nausea & Vomiting: no nausea and no vomiting  Complications: no  Cardiovascular status: hemodynamically stable  Respiratory status: acceptable  Hydration status: euvolemic

## 2021-11-24 NOTE — PROGRESS NOTES
[facility] MD Steven Nelson MD Trenton Gu, MD Daria Leo, MD  Progress Note    11/24/2021    SUBJECTIVE:   11/19 :Seen in her room she is feeling better since the cath was removed  On RA . Alert oriented , cooperative     11/20 : seen in her room , will clarify with vascular and ID when HD line to be placed back     11/21 ; seen in her room , HD today 1800 cc UF . BP ok . Has temporary fem line . Once cleared by ID (repeat cultures negative ) she is to have tesio placed in     11/22: \"I feel so well,\" but cannot be more specific. Denies pain. Denies nausea vomiting indigestion, chest pain or palpitation, shortness of breath at rest.  No swelling. No complications with dialysis. 11/23: Feeling much better today. No dyspnea at rest on room air. Appetite improving. ROS otherwise unremarkable. 11/24: Off the floor, getting tunneled dialysis catheter placed. Dialysis this morning without event. 1.4 L UF. BP stable throughout treatment. BFR through her temporary dialysis line was reasonable. Per dialysis nurse, no complications. PROBLEM LIST:    Active Problems:    Nausea & vomiting    Bacteremia    CLABSI (central line-associated bloodstream infection)    ESRD on hemodialysis ([facility])    Fever, unspecified  Resolved Problems:    * No resolved hospital problems. *       DIET:    Diet NPO       Allergies : Morphine, Other, and Pcn [penicillins]    Review of Systems:   Constitutional: no fevers , no chills , feels ok   Eyes: no eye pain , no itching , no drainage  Ears, nose, mouth, throat, and face: no ear ,nose pain , hearing is ok ,no nasal drainage   Respiratory: no sob ,no cough ,no wheezing . Cardiovascular: no chest pain , no palpitation ,no sob . Gastrointestinal: no nausea, vomiting , constipation , no abdominal pain . Genitourinary:no urinary retention , no burning , dysuria .  No polyuria   Hematologic/lymphatic: no bleeding , no cougulation issues . Musculoskeletal:no joint pain , no swelling . Neurological: no headaches ,no weakness , no numbness . Endocrine: no thirst , no weight issues .      MEDS (scheduled):    ceFAZolin  3,000 mg IntraVENous Once    [START ON 12/2/2021] ceFAZolin  3,000 mg IntraVENous Weekly - Thursday    [START ON 11/27/2021] ceFAZolin  2,000 mg IntraVENous Once per day on Tue Sat    aspirin  81 mg Oral Daily    atorvastatin  40 mg Oral Daily    carvedilol  12.5 mg Oral BID WC    clopidogrel  75 mg Oral Daily    insulin lispro  3 Units SubCUTAneous TID AC    insulin glargine  20 Units SubCUTAneous Daily    sodium chloride flush  10 mL IntraVENous 2 times per day    heparin (porcine)  5,000 Units SubCUTAneous 3 times per day       MEDS (infusions):   sodium chloride      dextrose         MEDS (prn):  fentanNYL, fentanNYL, ondansetron, simethicone, HYDROcodone-acetaminophen, cyclobenzaprine, sodium chloride flush, sodium chloride, promethazine **OR** ondansetron, polyethylene glycol, acetaminophen **OR** acetaminophen, glucose, dextrose, glucagon (rDNA), dextrose, trimethobenzamide    PHYSICAL EXAM:     Patient Vitals for the past 24 hrs:   BP Temp Temp src Pulse Resp SpO2 Height Weight   11/24/21 1119 -- -- -- -- -- -- 5' 4\" (1.626 m) --   11/24/21 1030 (!) 147/86 97.2 °F (36.2 °C) Oral 62 16 100 % -- --   11/24/21 1012 (!) 160/74 97.8 °F (36.6 °C) -- 62 18 -- -- 247 lb 12.8 oz (112.4 kg)   11/24/21 0930 (!) 170/75 -- -- 61 -- -- -- --   11/24/21 0900 (!) 156/70 -- -- 62 -- -- -- --   11/24/21 0830 (!) 141/67 -- -- 60 -- -- -- --   11/24/21 0800 (!) 158/72 -- -- 61 -- -- -- --   11/24/21 0730 (!) 141/71 -- -- 61 -- -- -- --   11/24/21 0702 (!) 172/69 97.9 °F (36.6 °C) -- 68 16 -- -- 251 lb 1.7 oz (113.9 kg)   11/23/21 2000 (!) 101/36 98.5 °F (36.9 °C) Oral 63 18 97 % -- --   11/23/21 1630 (!) 109/50 -- -- -- -- -- -- --   @      Intake/Output Summary (Last 24 hours) at 11/24/2021 1258  Last data filed at 11/24/2021 1012  Gross per 24 hour   Intake 300 ml   Output 2000 ml   Net -1700 ml         Wt Readings from Last 3 Encounters:   11/24/21 247 lb 12.8 oz (112.4 kg)   09/04/21 240 lb 1.3 oz (108.9 kg)       PE:  Off the floor, at line placement    DATA:    Recent Labs     11/23/21  0526 11/24/21  0710   WBC 12.4* 9.8   HGB 7.7* 7.6*   HCT 24.2* 23.8*   MCV 97.6 100.4*    175     Recent Labs     11/23/21  0526 11/24/21  0710   * 134   K 3.9 4.1   CL 92* 94*   CO2 23 24   MG 2.0 2.2   PHOS 3.6 5.6*   BUN 33* 50*   CREATININE 5.1* 7.1*       No results found for: LABPROT    ASSESSMENT       1. End-stage renal disease, on hemodialysis thrice weekly. 2.  Gram-positive cocci bacteremia/MSSA on Ancef per ID  3. Anemia of chronic disease. 4.  Hypertension. 5.  Secondary hyperparathyroidism/mineral bone disease due to ESRD    PENELOPE no vegetation   Clinically improved  Undergoing line placement    RECOMMENDATIONS  Abx per ID service   Consideration for MRI spine . Would avoid gadolinum .    If deemed clinically necessary to give gadolinium then will plan dialysis for three days in a row (to start within 1-2 hours of receiving gadolinium )  Continue IHD support for solute and volume clearance per holiday schedule this week, Monday, Wednesday, Saturday  Follow labs, BP    Okay for discharge from renal standpoint    Electronically signed by Raul Rodrigez MD on 11/24/2021

## 2021-11-24 NOTE — OP NOTE
Operative Note      Patient: Royal Bauer  YOB: 1955  MRN: [id number]    Date of Procedure: 11/24/2021    Pre-Op Diagnosis: ESRD, Dialysis acces    Post-Op Diagnosis: Same       Procedure  Attempted us guided access of right IJ  Right subclavian tunneled hd catheter insertion  Removal of R femoral temporary    Surgeon(s):  Mathew Montoya MD    Assistant:   Surgical Assistant: Zara Palacios    Anesthesia: Monitor Anesthesia Care    Estimated Blood Loss (mL): Minimal    Complications: None    Specimens:   * No specimens in log *    Implants:  * No implants in log *      Drains: * No LDAs found *    Findings: R IJ occlusion    DESCRIPTION OF PROCEDURE: The patient was identified and the procedure was confirmed. The right neck and chest were prepped and draped in the usual sterile fashion. Next, 1% lidocaine mixed with 0.25% Marcaine was used for local anesthesia. The right internal jugular vein was identified under US, noted to be patent, compressible and was percutaneously entered under US guidance but no flow was present. The vein closer to chest was noted to be thrombosed. The right subclavian vein was accessed. A guidewire was advanced into the superior vena cava under fluoroscopic guidance. A small incision was made around the wire. The catheter was pulled through a subcutaneous tunnel from an inferior chest stab incision to the neck incision. The dilators were passed over the wire followed by the introducer. The catheter was passed through the introducer and the tip was positioned in the superior vena cava right atrial junction under fluoroscopic guidance. Both lumens were noted to withdrawal and flush blood easily and were flushed with heparinized saline solution. They catheter was secured to the skin with nylon sutures and the neck incision was approximated with an interrupted Vicryl suture. right groin prepped. Sutures cut, temporary catheter removed and pressure held. Sterile dressings were applied to the incisions in the operating room. Needle, sponge, and instrument counts were reported as correct times two. The patient tolerated the procedure and was transferred back to the floor in satisfactory condition. CC : No primary care provider on file.    Dr. Yessy Brown  Electronically signed by Michael Servin MD on 11/24/2021 at 2:08 PM

## 2021-11-24 NOTE — PROGRESS NOTES
Comprehensive Nutrition Assessment    Type and Reason for Visit:  Initial, RD Nutrition Re-Screen/LOS    Nutrition Recommendations/Plan: Monitor diet progression/tolerance. Further nutritional intervention pending intake at meals. Nutrition Assessment:  Pt w/infected tunneled dialysis cath (+staph aureus bacteremia), s/p removal of tdc and temp cath placement. PMHx: ESRD w/ HD, DM2, anemia, CAD, HTN. Currently NPO for new tdc placement. Noted appetite improving. Monitor diet tolerance as resumed and need for additional intervention. Malnutrition Assessment:  Malnutrition Status:  No malnutrition    Context:  Chronic Illness     Findings of the 6 clinical characteristics of malnutrition:  Energy Intake:  Mild decrease in energy intake (Comment) (N/V, decreased intake on adm)  Weight Loss:  No significant weight loss     Body Fat Loss:  No significant body fat loss     Muscle Mass Loss:  No significant muscle mass loss    Fluid Accumulation:  Unable to assess     Strength:  Not Performed    Estimated Daily Nutrient Needs:  Energy (kcal):  9821-2976; Weight Used for Energy Requirements:  Current     Protein (g):  100-110gm (1.8 - 2gm / kg IBW);  Weight Used for Protein Requirements:  Ideal        Fluid (ml/day):   ; Method Used for Fluid Requirements:  Standard Renal      Nutrition Related Findings:  NPO for new tdc, no intake recorded - noted appetite improving, no more N/V,  +BS, +2 LE edema, HD      Wounds:   (infected site - removal dialysis cath)       Current Nutrition Therapies:    Diet NPO    Anthropometric Measures:  · Height: 5' 4\" (162.6 cm)  · Current Body Weight: 247 lb 12.8 oz (112.4 kg) (11/24)   · Admission Body Weight: 252 lb 10.4 oz (114.6 kg) (11/18)    · Usual Body Weight:       · Ideal Body Weight: 120 lbs; % Ideal Body Weight 206.5 %   · BMI: 42.5  · ABMI Categories: Obese Class 3 (BMI 40.0 or greater)       Nutrition Diagnosis:   No nutrition diagnosis at this time Nutrition Interventions:   Food and/or Nutrient Delivery:  Continue NPO (monitor for diet progression post procedure)  Nutrition Education/Counseling:  No recommendation at this time   Coordination of Nutrition Care:  Continue to monitor while inpatient    Goals:  Consume >75% or meals when diet resumed       Nutrition Monitoring and Evaluation:   Behavioral-Environmental Outcomes:  None Identified   Food/Nutrient Intake Outcomes:  Food and Nutrient Intake  Physical Signs/Symptoms Outcomes:  Skin, Nausea or Vomiting, Fluid Status or Edema, Biochemical Data     Discharge Planning:    No discharge needs at this time     Electronically signed by Kennedi Coombs RD, LD on 11/24/21 at 11:41 AM EST    Contact: ext [phone]

## 2021-11-25 VITALS
HEIGHT: 64 IN | BODY MASS INDEX: 42.3 KG/M2 | HEART RATE: 58 BPM | DIASTOLIC BLOOD PRESSURE: 65 MMHG | TEMPERATURE: 97.9 F | RESPIRATION RATE: 18 BRPM | WEIGHT: 247.8 LBS | OXYGEN SATURATION: 96 % | SYSTOLIC BLOOD PRESSURE: 102 MMHG

## 2021-11-25 LAB
ALBUMIN SERPL-MCNC: 3 G/DL (ref 3.5–5.2)
ALP BLD-CCNC: 123 U/L (ref 35–104)
ALT SERPL-CCNC: <5 U/L (ref 0–32)
ANION GAP SERPL CALCULATED.3IONS-SCNC: 17 MMOL/L (ref 7–16)
AST SERPL-CCNC: 19 U/L (ref 0–31)
BILIRUB SERPL-MCNC: 0.3 MG/DL (ref 0–1.2)
BUN BLDV-MCNC: 37 MG/DL (ref 6–23)
CALCIUM SERPL-MCNC: 7.4 MG/DL (ref 8.6–10.2)
CHLORIDE BLD-SCNC: 97 MMOL/L (ref 98–107)
CO2: 21 MMOL/L (ref 22–29)
CREAT SERPL-MCNC: 6.2 MG/DL (ref 0.5–1)
GFR AFRICAN AMERICAN: 8
GFR NON-AFRICAN AMERICAN: 7 ML/MIN/1.73
GLUCOSE BLD-MCNC: 112 MG/DL (ref 74–99)
HCT VFR BLD CALC: 24.9 % (ref 34–48)
HEMOGLOBIN: 7.8 G/DL (ref 11.5–15.5)
MCH RBC QN AUTO: 32.2 PG (ref 26–35)
MCHC RBC AUTO-ENTMCNC: 31.3 % (ref 32–34.5)
MCV RBC AUTO: 102.9 FL (ref 80–99.9)
METER GLUCOSE: 117 MG/DL (ref 74–99)
METER GLUCOSE: 148 MG/DL (ref 74–99)
METER GLUCOSE: 163 MG/DL (ref 74–99)
METER GLUCOSE: 179 MG/DL (ref 74–99)
METER GLUCOSE: 188 MG/DL (ref 74–99)
PDW BLD-RTO: 13.5 FL (ref 11.5–15)
PLATELET # BLD: 194 E9/L (ref 130–450)
PMV BLD AUTO: 10.9 FL (ref 7–12)
POTASSIUM SERPL-SCNC: 4.6 MMOL/L (ref 3.5–5)
RBC # BLD: 2.42 E12/L (ref 3.5–5.5)
SODIUM BLD-SCNC: 135 MMOL/L (ref 132–146)
TOTAL PROTEIN: 6.1 G/DL (ref 6.4–8.3)
WBC # BLD: 12.6 E9/L (ref 4.5–11.5)

## 2021-11-25 PROCEDURE — 6370000000 HC RX 637 (ALT 250 FOR IP): Performed by: PHYSICIAN ASSISTANT

## 2021-11-25 PROCEDURE — 85027 COMPLETE CBC AUTOMATED: CPT

## 2021-11-25 PROCEDURE — 2580000003 HC RX 258: Performed by: INTERNAL MEDICINE

## 2021-11-25 PROCEDURE — 6360000002 HC RX W HCPCS: Performed by: PHYSICIAN ASSISTANT

## 2021-11-25 PROCEDURE — 5A1D70Z PERFORMANCE OF URINARY FILTRATION, INTERMITTENT, LESS THAN 6 HOURS PER DAY: ICD-10-PCS | Performed by: INTERNAL MEDICINE

## 2021-11-25 PROCEDURE — 2700000000 HC OXYGEN THERAPY PER DAY

## 2021-11-25 PROCEDURE — 1200000000 HC SEMI PRIVATE

## 2021-11-25 PROCEDURE — 2580000003 HC RX 258: Performed by: PHYSICIAN ASSISTANT

## 2021-11-25 PROCEDURE — 82962 GLUCOSE BLOOD TEST: CPT

## 2021-11-25 PROCEDURE — 80053 COMPREHEN METABOLIC PANEL: CPT

## 2021-11-25 PROCEDURE — 36415 COLL VENOUS BLD VENIPUNCTURE: CPT

## 2021-11-25 RX ORDER — 0.9 % SODIUM CHLORIDE 0.9 %
500 INTRAVENOUS SOLUTION INTRAVENOUS ONCE
Status: COMPLETED | OUTPATIENT
Start: 2021-11-25 | End: 2021-11-25

## 2021-11-25 RX ADMIN — ATORVASTATIN CALCIUM 40 MG: 40 TABLET, FILM COATED ORAL at 08:43

## 2021-11-25 RX ADMIN — INSULIN LISPRO 3 UNITS: 100 INJECTION, SOLUTION INTRAVENOUS; SUBCUTANEOUS at 08:43

## 2021-11-25 RX ADMIN — HEPARIN SODIUM 5000 UNITS: 10000 INJECTION INTRAVENOUS; SUBCUTANEOUS at 22:39

## 2021-11-25 RX ADMIN — INSULIN LISPRO 3 UNITS: 100 INJECTION, SOLUTION INTRAVENOUS; SUBCUTANEOUS at 13:36

## 2021-11-25 RX ADMIN — HEPARIN SODIUM 5000 UNITS: 10000 INJECTION INTRAVENOUS; SUBCUTANEOUS at 13:36

## 2021-11-25 RX ADMIN — INSULIN LISPRO 3 UNITS: 100 INJECTION, SOLUTION INTRAVENOUS; SUBCUTANEOUS at 17:27

## 2021-11-25 RX ADMIN — CARVEDILOL 12.5 MG: 6.25 TABLET, FILM COATED ORAL at 17:26

## 2021-11-25 RX ADMIN — INSULIN GLARGINE 20 UNITS: 100 INJECTION, SOLUTION SUBCUTANEOUS at 08:43

## 2021-11-25 RX ADMIN — HEPARIN SODIUM 5000 UNITS: 10000 INJECTION INTRAVENOUS; SUBCUTANEOUS at 06:00

## 2021-11-25 RX ADMIN — CARVEDILOL 12.5 MG: 6.25 TABLET, FILM COATED ORAL at 08:43

## 2021-11-25 RX ADMIN — CLOPIDOGREL 75 MG: 75 TABLET, FILM COATED ORAL at 08:43

## 2021-11-25 RX ADMIN — SODIUM CHLORIDE 500 ML: 9 INJECTION, SOLUTION INTRAVENOUS at 10:51

## 2021-11-25 RX ADMIN — ASPIRIN 81 MG: 81 TABLET, COATED ORAL at 08:43

## 2021-11-25 RX ADMIN — HYDROCODONE BITARTRATE AND ACETAMINOPHEN 1 TABLET: 5; 325 TABLET ORAL at 02:58

## 2021-11-25 RX ADMIN — Medication 10 ML: at 22:39

## 2021-11-25 RX ADMIN — CYCLOBENZAPRINE 5 MG: 10 TABLET, FILM COATED ORAL at 22:44

## 2021-11-25 ASSESSMENT — PAIN SCALES - GENERAL
PAINLEVEL_OUTOF10: 2
PAINLEVEL_OUTOF10: 8

## 2021-11-25 NOTE — PROGRESS NOTES
Hospitalist Progress Note      SYNOPSIS:Patient admitted on 2021 for nausea and fever.  Blood cultures were positive for Staph aureus.  Vascular surgery was consulted for removal of tunneled dialysis catheter.  Serial blood cultures are still remain positive for staph aureus.  Vascular surgery also on board.  ID also on board.  had Mary Flock  which was negative for any evidence of endocarditis. Right femoral temporary dialysis catheter removed on  and on the same day right IJ tunnel hemodialysis catheter inserted. SUBJECTIVE:    Patient seen and examined in her room. Denies any chest pain, nausea, vomiting. No major overnight events. Records reviewed. Stable overnight. No other overnight issues reported. Temp (24hrs), Av °F (36.7 °C), Min:97.8 °F (36.6 °C), Max:98.1 °F (36.7 °C)    DIET: ADULT DIET; Regular; 4 carb choices (60 gm/meal); Low Fat/Low Chol/High Fiber/2 gm Na  CODE: Full Code  No intake or output data in the 24 hours ending 21 1428    OBJECTIVE:    BP (!) 106/51   Pulse 57   Temp 98.1 °F (36.7 °C) (Oral)   Resp 19   Ht 5' 4\" (1.626 m)   Wt 247 lb 12.8 oz (112.4 kg)   SpO2 92%   BMI 42.53 kg/m²     General appearance: No apparent distress, appears stated age and cooperative. HEENT:  Conjunctivae/corneas clear. Neck: Supple. No jugular venous distention. Respiratory: Clear to auscultation bilaterally, normal respiratory effort  Cardiovascular: Regular rate rhythm, normal S1-S2  Abdomen: Soft, nontender, nondistended  Musculoskeletal: No clubbing, cyanosis, no bilateral lower extremity edema. Brisk capillary refill.    Skin:  No rashes  on visible skin  Neurologic: awake, alert and following commands     ASSESSMENT & PLAN:    #Staph aureus bacteremia  -repeat blood cultures still positive for Staph  -On IV ancef  -ID on board  -Dialysis catheter was removed.  Now has temporary dialysis catheter  -had PENELOPE on  which was negative for any evidence of endocarditis     #Back pain  -has resolved.      #ESRD on HD  -nephrology on board  -Tunneled dialysis catheter removed and now has temporary dialysis catheter in place  -having regular dialysis.     #Type 2 diabetes mellitus  -on ISS. -On Lantus. -Accuchecks ACHS     #Anemia of chronic disease:  - Hb today is 7.7, down from 9 yesterday.   -transfuse if Hb <7      #CAD: On aspirin and statin as well as Plavix     DVT prophylaxis: Heparin    DISPOSITION:   Patient was ready for discharge but she was noted to have hypoxia on exertion requiring supplemental oxygen. Also her blood pressure dropped to 97B systolic requiring normal saline bolus 500 cc x 1. Discharge was held for another 24 hours.     Medications:  REVIEWED DAILY    Infusion Medications    sodium chloride      dextrose       Scheduled Medications    [START ON 12/2/2021] ceFAZolin  3,000 mg IntraVENous Weekly - Thursday    [START ON 11/27/2021] ceFAZolin  2,000 mg IntraVENous Once per day on Tue Sat    aspirin  81 mg Oral Daily    atorvastatin  40 mg Oral Daily    carvedilol  12.5 mg Oral BID WC    clopidogrel  75 mg Oral Daily    insulin lispro  3 Units SubCUTAneous TID AC    insulin glargine  20 Units SubCUTAneous Daily    sodium chloride flush  10 mL IntraVENous 2 times per day    heparin (porcine)  5,000 Units SubCUTAneous 3 times per day     PRN Meds: simethicone, HYDROcodone-acetaminophen, cyclobenzaprine, sodium chloride flush, sodium chloride, promethazine **OR** ondansetron, polyethylene glycol, acetaminophen **OR** acetaminophen, glucose, dextrose, glucagon (rDNA), dextrose, trimethobenzamide    Labs:     Recent Labs     11/23/21  0526 11/24/21  0710 11/25/21  0536   WBC 12.4* 9.8 12.6*   HGB 7.7* 7.6* 7.8*   HCT 24.2* 23.8* 24.9*    175 194       Recent Labs     11/23/21  0526 11/24/21  0710 11/25/21  0536   * 134 135   K 3.9 4.1 4.6   CL 92* 94* 97*   CO2 23 24 21*   BUN 33* 50* 37*   CREATININE 5.1* 7.1* 6.2* CALCIUM 7.8* 7.6* 7.4*   PHOS 3.6 5.6*  --        Recent Labs     11/25/21  0536   PROT 6.1*   ALKPHOS 123*   ALT <5   AST 19   BILITOT 0.3       No results for input(s): INR in the last 72 hours. No results for input(s): Genaro Gey in the last 72 hours. Chronic labs:    Lab Results   Component Value Date    INR 1.2 11/18/2021       Radiology: REVIEWED DAILY    +++++++++++++++++++++++++++++++++++++++++++++++++  Isma Garcias MD  [facility] Physician - [address] [place] [address]  +++++++++++++++++++++++++++++++++++++++++++++++++  NOTE: This report was transcribed using voice recognition software. Every effort was made to ensure accuracy; however, inadvertent computerized transcription errors may be present.

## 2021-11-25 NOTE — PROGRESS NOTES
[address] [place] [address] [facility]  [facility]  Progress Note    CC: MSSA bacteremia   Face to face encounter   SUBJECTIVE:  Patient is tolerating medications. No reported adverse drug reactions. ROS: No nausea, vomiting, diarrhea. No fevers. On 5 liters nasal canula. Resting in bed.   Tunneled catheter placed today  Medications:  Scheduled Meds:   [START ON 12/2/2021] ceFAZolin  3,000 mg IntraVENous Weekly - Thursday    [START ON 11/27/2021] ceFAZolin  2,000 mg IntraVENous Once per day on Tue Sat    aspirin  81 mg Oral Daily    atorvastatin  40 mg Oral Daily    carvedilol  12.5 mg Oral BID WC    clopidogrel  75 mg Oral Daily    insulin lispro  3 Units SubCUTAneous TID AC    insulin glargine  20 Units SubCUTAneous Daily    sodium chloride flush  10 mL IntraVENous 2 times per day    heparin (porcine)  5,000 Units SubCUTAneous 3 times per day     Continuous Infusions:   sodium chloride      dextrose       PRN Meds:simethicone, HYDROcodone-acetaminophen, cyclobenzaprine, sodium chloride flush, sodium chloride, promethazine **OR** ondansetron, polyethylene glycol, acetaminophen **OR** acetaminophen, glucose, dextrose, glucagon (rDNA), dextrose, trimethobenzamide  OBJECTIVE:  Patient Vitals for the past 24 hrs:   BP Temp Temp src Pulse Resp SpO2 Height Weight   11/24/21 1515 (!) 140/67 97.9 °F (36.6 °C) Oral 65 16 100 % -- --   11/24/21 1445 (!) 161/69 97.8 °F (36.6 °C) -- 62 -- 100 % -- --   11/24/21 1430 (!) 176/60 -- -- 66 -- 100 % -- --   11/24/21 1415 (!) 177/70 -- -- 68 -- 100 % -- --   11/24/21 1406 (!) 171/73 97 °F (36.1 °C) -- 69 16 100 % -- --   11/24/21 1400 (!) 171/73 97 °F (36.1 °C) Temporal 68 16 100 % -- --   11/24/21 1119 -- -- -- -- -- -- 5' 4\" (1.626 m) --   11/24/21 1030 (!) 147/86 97.2 °F (36.2 °C) Oral 62 16 100 % -- --   11/24/21 1012 (!) 160/74 97.8 °F (36.6 °C) -- 62 18 -- -- 247 lb 12.8 oz (112.4 kg)   11/24/21 0930 (!) 170/75 -- -- 61 -- -- -- --   11/24/21 0900 (!) 156/70 -- -- 62 -- -- -- --   11/24/21 0830 (!) 141/67 -- -- 60 -- -- -- --   11/24/21 0800 (!) 158/72 -- -- 61 -- -- -- --   11/24/21 0730 (!) 141/71 -- -- 61 -- -- -- --   11/24/21 0702 (!) 172/69 97.9 °F (36.6 °C) -- 68 16 -- -- 251 lb 1.7 oz (113.9 kg)     Constitutional: The patient is awake, alert, and oriented. Skin: Warm and dry. No rashes were noted. Head: Eyes show round, and reactive pupils. No jaundice. Mouth: Moist mucous membranes, no ulcerations, no thrush. Neck: Supple to movements. No lymphadenopathy. Chest: No use of accessory muscles to breathe. Symmetrical expansion. Auscultation reveals no wheezing, crackles, or rhonchi. Cardiovascular: S1 and S2 are rhythmic and regular. Abdomen: Positive bowel sounds to auscultation. Extremities: No clubbing, no cyanosis, some edema.  Has tubi- on to lower extremities   PIV  Hemodialysis tunneled catheter placed 11/24/2021  Laboratory and Tests Review:  Lab Results   Component Value Date    WBC 9.8 11/24/2021    WBC 12.4 (H) 11/23/2021    WBC 12.0 (H) 11/21/2021    HGB 7.6 (L) 11/24/2021    HCT 23.8 (L) 11/24/2021    .4 (H) 11/24/2021     11/24/2021     Lab Results   Component Value Date    NEUTROABS 9.39 (H) 11/21/2021    NEUTROABS 11.40 (H) 11/19/2021    NEUTROABS 13.34 (H) 11/17/2021     No results found for: CRP  Lab Results   Component Value Date    SEDRATE 103 (H) 11/21/2021     Lab Results   Component Value Date    ALT 21 11/19/2021    AST 30 11/19/2021    ALKPHOS 91 11/19/2021    BILITOT 0.5 11/19/2021     Lab Results   Component Value Date     11/24/2021    K 4.1 11/24/2021    K 4.6 11/19/2021    CL 94 11/24/2021    CO2 24 11/24/2021    BUN 50 11/24/2021    CREATININE 7.1 11/24/2021    GFRAA 7 11/24/2021    LABGLOM 6 11/24/2021    GLUCOSE 156 11/24/2021    PROT 6.5 11/19/2021    LABALBU 3.5 11/19/2021    CALCIUM 7.6 11/24/2021    BILITOT 0.5 11/19/2021    ALKPHOS 91 11/19/2021    AST 30 11/19/2021    ALT 21 11/19/2021 Radiology:  CT  Impression:        1. There is no nephrolithiasis, hydronephrosis, hydroureter or other   evidence for acute obstructive uropathy. 2. Probable cholelithiasis. 3. Mild diverticulosis. No acute diverticulitis seen.    4. Moderate-sized fat containing umbilical hernia     Microbiology:   11/17/2021- blood cx- staph aureus - MSSA  11/18/2021- blood cx- staph aureus   11/19/2021- blood cx- Staph aureus   11/21/2021- blood cx- NGTD  11/22/21 blood cx NGTD  11/19/2021- tip cx- ++ > 15 colonies staph aureus     ASSESSMENT:  · MSSA bacteremia    · S/P HD cath removal- CLABSI  · Leukocytosis   · ESRD- HD   ·   · Back pain-Rule out discitis vertebral osteo-    PLAN:  · Continue Ancef- 2 grams -2 grams- 3 grams- has been ordered for 4 weeks  · Med rec complete  · Check cultures  · PENELOPE-No PENELOPE indication of endocarditis;  Mild MR, TR, and AV sclerosis  · Monitor labs- repeat serial blood cultures ordered   · Okay for discharge; outpatient follow-up closely for the back pain and possible bone scan or CT if symptoms progress  · Back pain muscle related    Addy Sterling MD  8:26 PM

## 2021-11-25 NOTE — DISCHARGE SUMMARY
Hospitalist Discharge Summary    Patient ID: Parrish Wright   Patient : 1955  Patient's PCP: No primary care provider on file. Admit Date: 2021   Admitting Physician: Reji Campbell DO    Discharge Date:  2021   Discharge Physician: Inocente Foy MD   Discharge Condition: Stable  Discharge Disposition: [facility] course in brief:  Patient admitted on 2021 for nausea and fever.  Blood cultures were positive for Staph aureus.  Vascular surgery was consulted for removal of tunneled dialysis catheter.  Serial blood cultures are still remain positive for staph aureus.  Vascular surgery also on board.  ID also on board. had PENELOPE on  which was negative for any evidence of endocarditis. Right femoral temporary dialysis catheter removed on  and on the same day right IJ tunnel hemodialysis catheter inserted.     ID recommendations on discharge are as below that needs to be followed up:  · MSSA bacteremia    · S/P HD cath removal- CLABSI  · Leukocytosis   · ESRD- HD   ·    · Back pain-Rule out discitis vertebral osteo-     PLAN:  · Continue Ancef- 2 grams -2 grams- 3 grams- has been ordered for 4 weeks  · Med rec complete  · Check cultures  · PENELOPE-No PENELOPE indication of endocarditis;  Mild MR, TR, and AV sclerosis  · Monitor labs- repeat serial blood cultures ordered   · Okay for discharge; outpatient follow-up closely for the back pain and possible bone scan or CT if symptoms progress  · Back pain muscle related        Consults:   IP CONSULT TO INTERNAL MEDICINE  IP CONSULT TO NEPHROLOGY  IP CONSULT TO VASCULAR SURGERY  IP CONSULT TO INFECTIOUS DISEASES  IP CONSULT TO VASCULAR SURGERY    Discharge Diagnoses:     #Staph aureus bacteremia  -repeat blood cultures still positive for Staph  -On IV ancef  -ID on board  -Dialysis catheter was removed.  Now has temporary dialysis catheter  -had PENELOPE on  which was negative for any evidence of endocarditis     #Back pain  -has resolved.      #ESRD on HD  -nephrology on board  -Tunneled dialysis catheter removed and now has temporary dialysis catheter in place  -having regular dialysis.     #Type 2 diabetes mellitus  -on ISS. -On Lantus. -Accuchecks ACHS     #Anemia of chronic disease:  - Hb today is 7.7, down from 9 yesterday.   -transfuse if Hb <7      #CAD: On aspirin and statin as well as Plavix     DVT prophylaxis: Heparin       Discharge Instructions / Follow up:    No future appointments. Continued appropriate risk factor modification of blood pressure, diabetes and serum lipids will remain essential to reducing risk of future atherosclerotic development    Activity: activity as tolerated    Significant labs:  CBC:   Recent Labs     11/23/21  0526 11/24/21  0710 11/25/21  0536   WBC 12.4* 9.8 12.6*   RBC 2.48* 2.37* 2.42*   HGB 7.7* 7.6* 7.8*   HCT 24.2* 23.8* 24.9*   MCV 97.6 100.4* 102.9*   RDW 13.3 13.4 13.5    175 194     BMP:   Recent Labs     11/23/21  0526 11/24/21  0710 11/25/21  0536   * 134 135   K 3.9 4.1 4.6   CL 92* 94* 97*   CO2 23 24 21*   BUN 33* 50* 37*   CREATININE 5.1* 7.1* 6.2*   MG 2.0 2.2  --    PHOS 3.6 5.6*  --      LFT:  Recent Labs     11/25/21  0536   PROT 6.1*   ALKPHOS 123*   ALT <5   AST 19   BILITOT 0.3     PT/INR: No results for input(s): INR, APTT in the last 72 hours. BNP: No results for input(s): BNP in the last 72 hours.   Hgb A1C: No results found for: LABA1C  Folate and B12: No results found for: Tanner Khalil, No results found for: FOLATE  Thyroid Studies: No results found for: TSH, A7MQRWC, V2WXVWR, THYROIDAB    Urinalysis:    Lab Results   Component Value Date    NITRU Negative 11/18/2021    WBCUA 1-3 11/18/2021    BACTERIA FEW 11/18/2021    RBCUA 0-1 11/18/2021    BLOODU SMALL 11/18/2021    SPECGRAV 1.020 11/18/2021    GLUCOSEU 500 11/18/2021       Imaging:  ECHO Transesophageal    Result Date: 11/23/2021  Transesophageal Echocardiography Report (PENELOPE)   Demographics Patient Name       Tre Juarez         Gender               Female                     RADHA   Medical Record     [id number]      Room Number          [address]  Number   Account #          [de-identified]     Procedure Date       11/23/2021   Corporate ID                     Ordering Physician   Accession Number   [id number]    Referring Physician   Date of Birth      1955    Sonographer          Caitlinphilip GongSimms MOY   Age                73 year(s)    Interpreting         Sabrina Griffiths MD                                   Physician                                    Any Other  Procedure Type of Study   PENELOPE procedure:Transesophageal Echo (PENELOPE). Procedure Date Date: 11/23/2021 Start: 08:55 AM Study Location: CVL Technical Quality: Adequate visualization Indications:R/O Vegetation. Patient Status: Routine Height: 64 inches Weight: 247 pounds BSA: 2.14 m^2 BMI: 42.4 kg/m^2 Rhythm: Within normal limits HR: 77 bpm BP: 153/86 mmHg O2 Saturation: 98 % PENELOPE Performed By: the attending and the sonographer  Type of Anesthesia: Moderate sedation  Allergies   - Morphine.   - Penicillins. Findings   Left Ventricle  Normal left ventricular chamber size and systolic function. Right Ventricle  Normal right ventricle structure and function. Left Atrium  Left atrium is mildly enlarged. Interatrial septum intact, focal area of calcification near mid septum. No thrombus in left atrium or left atrial appendage. Right Atrium  Moderately enlarged right atrium size. Mitral Valve  Normal mitral valve structure. There is mild mitral regurgitation. No mitral valve prolapse. There is mild tricuspid regurgitation. Tricuspid Valve  Normal tricuspid valve structure. There is mild tricuspid regurgitation. There is mild tricuspid regurgitation. Mild Pulmonary hypertension, RVSP 40mmHg. Aortic Valve  The aortic valve appears moderately sclerotic.   No hemodynamically significant aortic stenosis, mean gradient 14mmHg, peak  gradient 24mmHg, ERENDIRA 2.1cm2 by VTI, Vmax and planimetry. There is mild tricuspid regurgitation. Pulmonic Valve  The pulmonic valve was not well visualized. Pericardial Effusion  No evidence of pericardial effusion. Pericardium appears normal.   Pleural Effusion  No evidence of pleural effusion. Aorta  Normal aortic root size and ascending aorta. Miscellaneous  The inferior vena cava diameter is normal with normal respiratory  variation. Conclusions   Summary  No PENELOPE indication of Endocarditis. Normal left ventricular chamber size and systolic function. Left atrium is mildly enlarged. Interatrial septum intact, focal area of calcification near mid septum. No thrombus in left atrium or left atrial appendage. Normal right ventricle size and function. Moderately enlarged right atrium size. The aortic valve appears moderately sclerotic. No hemodynamically significant aortic stenosis, mean gradient 14mmHg, peak  gradient 24mmHg, ERENDIRA 2.1cm2 by VTI, Vmax and planimetry. There is mild tricuspid regurgitation. Mild Pulmonary hypertension, RVSP 40mmHg. Normal aortic root size. No evidence of pericardial effusion. No comparison study available.    Signature   ----------------------------------------------------------------  Electronically signed by Ned Chen MD(Interpreting  physician) on 11/23/2021 05:30 PM  ----------------------------------------------------------------  M-Mode/2D Measurements & Calculations   CO: 8.89 l/min  CI: 4.15 l/m*m^2            AV Area (2D): 2.1 cm^2                               LVOT: 2.3 cm  Doppler Measurements & Calculations    AV Peak Velocity: 2.46    LVOT Peak Velocity: 1.29 m/s   m/s                       LVOT Mean Velocity: 0.87 m/s   AV Peak Gradient: 24.29   LVOT Peak Gradient: 6.7 mmHgLVOT Mean Gradient:   mmHg                      3.6 mmHg   AV Mean Velocity: 1.8 m/s   AV Mean Gradient: 13.9   mmHg   AV VTI: 52.4 cm           TR Velocity:2.95 m/s   AV Area (Continuity):2.2 TR Gradient:34.86 mmHg   cm^2    LVOT VTI: 27.8 cm  [id number]    CT ABDOMEN PELVIS WO CONTRAST Additional Contrast? None    Result Date: 11/18/2021  EXAMINATION: CT OF THE ABDOMEN AND PELVIS WITHOUT CONTRAST 11/17/2021 11:56 pm TECHNIQUE: CT of the abdomen and pelvis was performed without the administration of intravenous contrast. Multiplanar reformatted images are provided for review. Dose modulation, iterative reconstruction, and/or weight based adjustment of the mA/kV was utilized to reduce the radiation dose to as low as reasonably achievable. COMPARISON: None. HISTORY: ORDERING SYSTEM PROVIDED HISTORY: Flank pain fever TECHNOLOGIST PROVIDED HISTORY: Reason for exam:->Flank pain fever Additional Contrast?->None Decision Support Exception - unselect if not a suspected or confirmed emergency medical condition->Emergency Medical Condition (MA) What reading provider will be dictating this exam?->CRC FINDINGS: Lower Chest: The visualized portions of the lung bases are clear. Organs: Within the limitations of a noncontrast exam, the visualized liver, spleen, pancreas, adrenal glands and kidneys demonstrate no significant abnormality. There is no nephrolithiasis, hydronephrosis, hydroureter or other evidence for acute obstructive uropathy. GI/Bowel: There are no findings of intestinal obstruction. The appendix is not visualized. There is probable cholelithiasis there is mild diverticulosis involving descending colon. There is no acute diverticulitis seen. Pelvis:  Bladder is unremarkable in appearance. There is no abnormal pelvic mass or fluid collection seen. Peritoneum/Retroperitoneum: There is no abdominal aortic aneurysm. There is no abnormal lymphadenopathy seen. There is no abnormal fluid collection. There is no free intraperitoneal air. Bones/Soft Tissues:  There is a moderate-sized fat containing umbilical hernia. There is no acute abnormality seen involving visualized osseous structures. 1.  There is no nephrolithiasis, hydronephrosis, hydroureter or other evidence for acute obstructive uropathy. 2. Probable cholelithiasis. 3. Mild diverticulosis. No acute diverticulitis seen. 4. Moderate-sized fat containing umbilical hernia. XR CHEST PORTABLE    Result Date: 11/24/2021  EXAMINATION: ONE XRAY VIEW OF THE CHEST 11/24/2021 11:42 am COMPARISON: One-view chest x-ray 11/17/2021 HISTORY: ORDERING SYSTEM PROVIDED HISTORY: line placement TECHNOLOGIST PROVIDED HISTORY: Reason for exam:->line placement What reading provider will be dictating this exam?->CRC FINDINGS: There is moderate enlargement of the cardiac silhouette, which may be exaggerated by AP technique. The mediastinal contours are within normal limits. There is interval removal of the previously noted large caliber right internal jugular central venous catheter. A new large caliber right subclavian central venous catheter terminates in the region of the right atrium. The catheter appears intact with mild luminal narrowing in the region of the thoracic outlet. The lungs are hypoinflated. No significant pleural effusions or pneumothorax. Osseous degenerative changes are present. 1. Large caliber right subclavian central venous catheter terminates in the region of the right atrium with concern for pinching of the catheter between the clavicle and 1st rib, which can result in suboptimal catheter function (often affected by differences in patient arm position) and increased risk for catheter fracture. 2. No evidence of pneumothorax or significant pleural effusions. RECOMMENDATION: \"Pinch off\" sign of the right subclavian central venous catheter. Recommend removal.     XR CHEST PORTABLE    Result Date: 11/17/2021  EXAMINATION: ONE XRAY VIEW OF THE CHEST 11/17/2021 10:46 pm COMPARISON: None.  HISTORY: ORDERING SYSTEM PROVIDED HISTORY: fever TECHNOLOGIST PROVIDED HISTORY: Reason for exam:->fever What reading provider will be dictating this exam?->CRC FINDINGS: The heart is mildly enlarged. Pulmonary vessels are congested. There is no pneumothorax. The costophrenic angles are clear. Dialysis catheter in place. Mild cardiomegaly and suspect pulmonary vascular congestion. Discharge Medications:      Medication List      START taking these medications    * ceFAZolin  infusion  Commonly known as: ANCEF  Infuse 3,000 mg intravenously once a week Compound per protocol. Every Saturday after HD. Stop date 12/23/21. * ceFAZolin  infusion  Commonly known as: ANCEF  Infuse 2,000 mg intravenously Twice a Week for 28 days Compound per protocol. Tuesday and Thursday after HD. Stop date 12/23/2021         * This list has 2 medication(s) that are the same as other medications prescribed for you. Read the directions carefully, and ask your doctor or other care provider to review them with you.             CONTINUE taking these medications    aspirin 81 MG EC tablet     atorvastatin 40 MG tablet  Commonly known as: LIPITOR     carvedilol 12.5 MG tablet  Commonly known as: COREG     clopidogrel 75 MG tablet  Commonly known as: PLAVIX     NovoLOG 100 UNIT/ML injection vial  Generic drug: insulin aspart     Toujeo Max SoloStar 300 UNIT/ML Sopn  Generic drug: Insulin Glargine (2 Unit Dial)           Where to Get Your Medications      You can get these medications from any pharmacy    Bring a paper prescription for each of these medications  · ceFAZolin  infusion  · ceFAZolin  infusion         Time Spent on discharge is more than 45 minutes in the examination, evaluation, counseling and review of medications and discharge plan.    +++++++++++++++++++++++++++++++++++++++++++++++++  Rose Garcia MD  [address]  +++++++++++++++++++++++++++++++++++++++++++++++++  NOTE: This report was transcribed using voice recognition software. Every effort was made to ensure accuracy; however, inadvertent computerized transcription errors may be present.

## 2021-11-25 NOTE — PROGRESS NOTES
[address] [place] [address] [facility]  [facility]IC[facility]  Progress Note    CC: MSSA bacteremia   Face to face encounter   SUBJECTIVE:  Patient is tolerating medications. No reported adverse drug reactions. ROS: No nausea, vomiting, diarrhea. No fevers. On 4 liters nasal canula. Resting in bed.   Tunneled catheter placed right chest  No complaints or concerns  Medications:  Scheduled Meds:   [START ON 12/2/2021] ceFAZolin  3,000 mg IntraVENous Weekly - Thursday    [START ON 11/27/2021] ceFAZolin  2,000 mg IntraVENous Once per day on Tue Sat    aspirin  81 mg Oral Daily    atorvastatin  40 mg Oral Daily    carvedilol  12.5 mg Oral BID WC    clopidogrel  75 mg Oral Daily    insulin lispro  3 Units SubCUTAneous TID AC    insulin glargine  20 Units SubCUTAneous Daily    sodium chloride flush  10 mL IntraVENous 2 times per day    heparin (porcine)  5,000 Units SubCUTAneous 3 times per day     Continuous Infusions:   sodium chloride      dextrose       PRN Meds:simethicone, HYDROcodone-acetaminophen, cyclobenzaprine, sodium chloride flush, sodium chloride, promethazine **OR** ondansetron, polyethylene glycol, acetaminophen **OR** acetaminophen, glucose, dextrose, glucagon (rDNA), dextrose, trimethobenzamide  OBJECTIVE:  Patient Vitals for the past 24 hrs:   BP Temp Temp src Pulse Resp SpO2 Height Weight   11/25/21 0812 (!) 127/56 98.1 °F (36.7 °C) Oral 59 -- 95 % -- --   11/25/21 0730 (!) 127/56 98.1 °F (36.7 °C) -- 63 19 96 % -- --   11/24/21 1515 (!) 140/67 97.9 °F (36.6 °C) Oral 65 16 100 % -- --   11/24/21 1445 (!) 161/69 97.8 °F (36.6 °C) -- 62 -- 100 % -- --   11/24/21 1430 (!) 176/60 -- -- 66 -- 100 % -- --   11/24/21 1415 (!) 177/70 -- -- 68 -- 100 % -- --   11/24/21 1406 (!) 171/73 97 °F (36.1 °C) -- 69 16 100 % -- --   11/24/21 1400 (!) 171/73 97 °F (36.1 °C) Temporal 68 16 100 % -- --   11/24/21 1119 -- -- -- -- -- -- 5' 4\" (1.626 m) --   11/24/21 1030 (!) 147/86 97.2 °F (36.2 °C) Oral 62 16 100 % -- -- 11/24/21 1012 (!) 160/74 97.8 °F (36.6 °C) -- 62 18 -- -- 247 lb 12.8 oz (112.4 kg)   11/24/21 0930 (!) 170/75 -- -- 61 -- -- -- --   11/24/21 0900 (!) 156/70 -- -- 62 -- -- -- --     Constitutional: The patient is awake, alert, and oriented. Skin: Warm and dry. No rashes were noted. Head:  No jaundice. Mouth: Moist mucous membranes, no ulcerations, no thrush. Neck: Supple to movements. No lymphadenopathy. Chest: No use of accessory muscles to breathe. Symmetrical expansion. Auscultation reveals no wheezing, crackles, or rhonchi. Cardiovascular: S1 and S2 are rhythmic and regular. Abdomen: Positive bowel sounds to auscultation. Extremities: No clubbing, no cyanosis, some edema. Has tubi- on to lower extremities   PIV  Hemodialysis tunneled catheter placed 11/24/2021  Laboratory and Tests Review:  Lab Results   Component Value Date    WBC 12.6 (H) 11/25/2021    WBC 9.8 11/24/2021    WBC 12.4 (H) 11/23/2021    HGB 7.8 (L) 11/25/2021    HCT 24.9 (L) 11/25/2021    .9 (H) 11/25/2021     11/25/2021     Lab Results   Component Value Date    NEUTROABS 9.39 (H) 11/21/2021    NEUTROABS 11.40 (H) 11/19/2021    NEUTROABS 13.34 (H) 11/17/2021     No results found for: CRP  Lab Results   Component Value Date    SEDRATE 103 (H) 11/21/2021     Lab Results   Component Value Date    ALT <5 11/25/2021    AST 19 11/25/2021    ALKPHOS 123 (H) 11/25/2021    BILITOT 0.3 11/25/2021     Lab Results   Component Value Date     11/25/2021    K 4.6 11/25/2021    K 4.6 11/19/2021    CL 97 11/25/2021    CO2 21 11/25/2021    BUN 37 11/25/2021    CREATININE 6.2 11/25/2021    GFRAA 8 11/25/2021    LABGLOM 7 11/25/2021    GLUCOSE 112 11/25/2021    PROT 6.1 11/25/2021    LABALBU 3.0 11/25/2021    CALCIUM 7.4 11/25/2021    BILITOT 0.3 11/25/2021    ALKPHOS 123 11/25/2021    AST 19 11/25/2021    ALT <5 11/25/2021     Radiology:  CT  Impression:        1.   There is no nephrolithiasis, hydronephrosis, hydroureter or other   evidence for acute obstructive uropathy. 2. Probable cholelithiasis. 3. Mild diverticulosis. No acute diverticulitis seen. 4. Moderate-sized fat containing umbilical hernia     Microbiology:   11/17/2021- blood cx- staph aureus - MSSA  11/18/2021- blood cx- staph aureus   11/19/2021- blood cx- Staph aureus   11/21/2021- blood cx- NGTD  11/22/21 blood cx NGTD  11/19/2021- tip cx- ++ > 15 colonies staph aureus     ASSESSMENT:  · MSSA bacteremia    · S/P HD cath removal- CLABSI  · Leukocytosis   · ESRD- HD   ·   · Back pain-Rule out discitis vertebral osteo-    PLAN:  · Continue Ancef- 2 grams -2 grams- 3 grams- has been ordered for 4 weeks  · Med rec complete  · Check cultures  · PENELOPE-No PENELOPE indication of endocarditis;  Mild MR, TR, and AV sclerosis  · Monitor labs- repeat serial blood cultures ordered   · Okay for discharge; outpatient follow-up closely for the back pain and possible bone scan or CT if symptoms progress  · Plan is home today    Ariadne Segura, APRN - CNP  8:48 AM   Pt seen and examined. Above discussed agree with advanced practice nurse. Labs, cultures, and radiographs reviewed. Face to Face encounter occurred. Changes made as necessary.      Andre Dawkins MD

## 2021-11-25 NOTE — PLAN OF CARE
Problem: Falls - Risk of:  Goal: Will remain free from falls  Description: Will remain free from falls  Outcome: Met This Shift  Goal: Absence of physical injury  Description: Absence of physical injury  Outcome: Met This Shift     Problem: Skin Integrity:  Goal: Will show no infection signs and symptoms  Description: Will show no infection signs and symptoms  Outcome: Met This Shift     Problem: Pain:  Goal: Control of acute pain  Description: Control of acute pain  Outcome: Met This Shift  Goal: Control of chronic pain  Description: Control of chronic pain  Outcome: Met This Shift

## 2021-11-26 LAB
ALBUMIN SERPL-MCNC: 3.1 G/DL (ref 3.5–5.2)
ALP BLD-CCNC: 129 U/L (ref 35–104)
ALT SERPL-CCNC: <5 U/L (ref 0–32)
ANION GAP SERPL CALCULATED.3IONS-SCNC: 20 MMOL/L (ref 7–16)
AST SERPL-CCNC: 20 U/L (ref 0–31)
BILIRUB SERPL-MCNC: 0.3 MG/DL (ref 0–1.2)
BLOOD CULTURE, ROUTINE: NORMAL
BLOOD CULTURE, ROUTINE: NORMAL
BUN BLDV-MCNC: 54 MG/DL (ref 6–23)
CALCIUM SERPL-MCNC: 7.5 MG/DL (ref 8.6–10.2)
CHLORIDE BLD-SCNC: 93 MMOL/L (ref 98–107)
CO2: 22 MMOL/L (ref 22–29)
CREAT SERPL-MCNC: 7.9 MG/DL (ref 0.5–1)
GFR AFRICAN AMERICAN: 6
GFR NON-AFRICAN AMERICAN: 5 ML/MIN/1.73
GLUCOSE BLD-MCNC: 257 MG/DL (ref 74–99)
HCT VFR BLD CALC: 24.9 % (ref 34–48)
HEMOGLOBIN: 7.9 G/DL (ref 11.5–15.5)
MCH RBC QN AUTO: 31.9 PG (ref 26–35)
MCHC RBC AUTO-ENTMCNC: 31.7 % (ref 32–34.5)
MCV RBC AUTO: 100.4 FL (ref 80–99.9)
METER GLUCOSE: 285 MG/DL (ref 74–99)
PDW BLD-RTO: 13.5 FL (ref 11.5–15)
PLATELET # BLD: 199 E9/L (ref 130–450)
PMV BLD AUTO: 11.3 FL (ref 7–12)
POTASSIUM SERPL-SCNC: 4.5 MMOL/L (ref 3.5–5)
RBC # BLD: 2.48 E12/L (ref 3.5–5.5)
SODIUM BLD-SCNC: 135 MMOL/L (ref 132–146)
TOTAL PROTEIN: 6.3 G/DL (ref 6.4–8.3)
WBC # BLD: 11.7 E9/L (ref 4.5–11.5)

## 2021-11-26 PROCEDURE — 2580000003 HC RX 258: Performed by: PHYSICIAN ASSISTANT

## 2021-11-26 PROCEDURE — 6370000000 HC RX 637 (ALT 250 FOR IP): Performed by: PHYSICIAN ASSISTANT

## 2021-11-26 PROCEDURE — 97530 THERAPEUTIC ACTIVITIES: CPT

## 2021-11-26 PROCEDURE — 85027 COMPLETE CBC AUTOMATED: CPT

## 2021-11-26 PROCEDURE — 6360000002 HC RX W HCPCS: Performed by: PHYSICIAN ASSISTANT

## 2021-11-26 PROCEDURE — 80053 COMPREHEN METABOLIC PANEL: CPT

## 2021-11-26 PROCEDURE — 97162 PT EVAL MOD COMPLEX 30 MIN: CPT

## 2021-11-26 PROCEDURE — 36415 COLL VENOUS BLD VENIPUNCTURE: CPT

## 2021-11-26 PROCEDURE — 82962 GLUCOSE BLOOD TEST: CPT

## 2021-11-26 RX ADMIN — CARVEDILOL 12.5 MG: 6.25 TABLET, FILM COATED ORAL at 09:17

## 2021-11-26 RX ADMIN — CLOPIDOGREL 75 MG: 75 TABLET, FILM COATED ORAL at 09:18

## 2021-11-26 RX ADMIN — HYDROCODONE BITARTRATE AND ACETAMINOPHEN 1 TABLET: 5; 325 TABLET ORAL at 09:17

## 2021-11-26 RX ADMIN — ATORVASTATIN CALCIUM 40 MG: 40 TABLET, FILM COATED ORAL at 09:18

## 2021-11-26 RX ADMIN — Medication 10 ML: at 10:16

## 2021-11-26 RX ADMIN — ASPIRIN 81 MG: 81 TABLET, COATED ORAL at 09:18

## 2021-11-26 RX ADMIN — INSULIN LISPRO 3 UNITS: 100 INJECTION, SOLUTION INTRAVENOUS; SUBCUTANEOUS at 09:19

## 2021-11-26 RX ADMIN — INSULIN GLARGINE 20 UNITS: 100 INJECTION, SOLUTION SUBCUTANEOUS at 09:19

## 2021-11-26 RX ADMIN — HEPARIN SODIUM 5000 UNITS: 10000 INJECTION INTRAVENOUS; SUBCUTANEOUS at 06:16

## 2021-11-26 ASSESSMENT — PAIN SCALES - GENERAL: PAINLEVEL_OUTOF10: 10

## 2021-11-26 NOTE — PROGRESS NOTES
[address] [place] [address] [facility]  Progress Note    CC: MSSA bacteremia   Face to face encounter   SUBJECTIVE:  Patient is tolerating medications. No reported adverse drug reactions. ROS: No nausea, vomiting, diarrhea. No fevers. On 4 liters nasal canula. Resting in bed. Tunneled catheter placed right chest  No complaints or concerns  Right shoulder pain, seen by PT, Felt it was a pinched nerve. Discharge was delayed due to hypotension and hypoxia on exertion. Given iv fluids. Medications:  Scheduled Meds:   [START ON 12/2/2021] ceFAZolin  3,000 mg IntraVENous Weekly - Thursday    [START ON 11/27/2021] ceFAZolin  2,000 mg IntraVENous Once per day on Tue Sat    aspirin  81 mg Oral Daily    atorvastatin  40 mg Oral Daily    carvedilol  12.5 mg Oral BID WC    clopidogrel  75 mg Oral Daily    insulin lispro  3 Units SubCUTAneous TID AC    insulin glargine  20 Units SubCUTAneous Daily    sodium chloride flush  10 mL IntraVENous 2 times per day    heparin (porcine)  5,000 Units SubCUTAneous 3 times per day     Continuous Infusions:   sodium chloride      dextrose       PRN Meds:simethicone, HYDROcodone-acetaminophen, cyclobenzaprine, sodium chloride flush, sodium chloride, promethazine **OR** ondansetron, polyethylene glycol, acetaminophen **OR** acetaminophen, glucose, dextrose, glucagon (rDNA), dextrose, trimethobenzamide  OBJECTIVE:  Patient Vitals for the past 24 hrs:   BP Temp Temp src Pulse Resp SpO2   11/25/21 2103 102/65 97.9 °F (36.6 °C) Temporal 58 18 96 %   11/25/21 1726 102/65 -- -- 58 -- --   11/25/21 1330 (!) 106/51 -- -- 57 -- 92 %   11/25/21 1300 (!) 94/44 -- -- 54 -- --     Constitutional: The patient is awake, alert, and oriented. Skin: Warm and dry. No rashes were noted. Head:  No jaundice. Mouth: Moist mucous membranes, no ulcerations, no thrush. Neck: Supple to movements. No lymphadenopathy. Chest: No use of accessory muscles to breathe. Symmetrical expansion. Auscultation reveals no wheezing, crackles, or rhonchi. Cardiovascular: S1 and S2 are rhythmic and regular. Abdomen: Positive bowel sounds to auscultation. Extremities: No clubbing, no cyanosis, some edema. Has tubi- on to lower extremities   PIV  Hemodialysis tunneled catheter placed 11/24/2021  Laboratory and Tests Review:  Lab Results   Component Value Date    WBC 11.7 (H) 11/26/2021    WBC 12.6 (H) 11/25/2021    WBC 9.8 11/24/2021    HGB 7.9 (L) 11/26/2021    HCT 24.9 (L) 11/26/2021    .4 (H) 11/26/2021     11/26/2021     Lab Results   Component Value Date    NEUTROABS 9.39 (H) 11/21/2021    NEUTROABS 11.40 (H) 11/19/2021    NEUTROABS 13.34 (H) 11/17/2021     No results found for: CRP  Lab Results   Component Value Date    SEDRATE 103 (H) 11/21/2021     Lab Results   Component Value Date    ALT <5 11/26/2021    AST 20 11/26/2021    ALKPHOS 129 (H) 11/26/2021    BILITOT 0.3 11/26/2021     Lab Results   Component Value Date     11/26/2021    K 4.5 11/26/2021    K 4.6 11/19/2021    CL 93 11/26/2021    CO2 22 11/26/2021    BUN 54 11/26/2021    CREATININE 7.9 11/26/2021    GFRAA 6 11/26/2021    LABGLOM 5 11/26/2021    GLUCOSE 257 11/26/2021    PROT 6.3 11/26/2021    LABALBU 3.1 11/26/2021    CALCIUM 7.5 11/26/2021    BILITOT 0.3 11/26/2021    ALKPHOS 129 11/26/2021    AST 20 11/26/2021    ALT <5 11/26/2021     Radiology:  CT  Impression:        1. There is no nephrolithiasis, hydronephrosis, hydroureter or other   evidence for acute obstructive uropathy. 2. Probable cholelithiasis. 3. Mild diverticulosis. No acute diverticulitis seen.    4. Moderate-sized fat containing umbilical hernia     Microbiology:   11/17/2021- blood cx- staph aureus - MSSA  11/18/2021- blood cx- staph aureus   11/19/2021- blood cx- Staph aureus   11/21/2021- blood cx- NGTD  11/22/21 blood cx NGTD  11/19/2021- tip cx- ++ > 15 colonies staph aureus     ASSESSMENT:  MSSA bacteremia    S/P HD cath removal- CLABSI  Leukocytosis   ESRD- HD   Back pain-Rule out discitis vertebral osteo    PLAN:  Continue Ancef- 2 grams -2 grams- 3 grams- has been ordered for 4 weeks  Med rec complete  Check cultures  PENELOPE-No PENELOPE indication of endocarditis;  Mild MR, TR, and AV sclerosis  Monitor labs-  Okay for discharge; outpatient follow-up closely for the back pain and possible bone scan or CT if symptoms progress  Plan is home today    Lina Janeths, APRN - CNP  12:27 PM     Pt seen and examined. Above discussed agree with advanced practice nurse. Labs, cultures, and radiographs reviewed. Face to Face encounter occurred. Changes made as necessary.      Suzanne Crouch MD

## 2021-11-26 NOTE — PROGRESS NOTES
Physical Therapy   Initial Assessment     Name: Indigo Orantes  : 1955  MRN: [id number]      Date of Service: 2021    Evaluating PT:  Nick Olvera. Stanislaw Mcintyre, [id number]    Room #:  1688/3530-O  Diagnosis:  Nausea & vomiting [R11.2]  Fever, unspecified [R50.9]  Bacteremia [R78.81]  PMHx/PSHx:     has a past medical history of Brain aneurysm, CLABSI (central line-associated bloodstream infection), Diabetes mellitus ([facility] [address] ESRD on hemodialysis ([facility] H/O heart artery stent, Hyperlipidemia, and Hypertension. has a past surgical history that includes Cardiac surgery;  section; and vascular surgery (N/A, 2021). Precautions:  Fall risk, dialysis    SUBJECTIVE:    Pt lives with her daughter in a 1 story home with 1 step to enter and no rail. Pt ambulated with a [employer] PTA. OBJECTIVE:   Initial Evaluation  Date: 21 Treatment Short Term/ Long Term   Goals   AM-PAC 6 Clicks 37/28     Was pt agreeable to Eval/treatment? Yes     Does pt have pain? Reported R side neck/shoulder pain     Bed Mobility  Rolling: NT  Supine to sit: NT  Sit to supine: NT  Scooting: NT  Modified Independent   Transfers Sit to stand: April  Stand to sit: April  Stand pivot: April with [employer]  Supervision   Ambulation    20 feet with [employer] with April  >50 feet with [employer] with Supervision   Stair negotiation: ascended and descended  NT  4 steps with 1 rail with SBA   BLE ROM WFL     BLE Strength Grossly 4-/5     Balance Sitting: Independent in chair  Standing: April  Standing: Supervision     Pt is A & O x 4  Sensation:  Denied numbness/tingling  Edema:  None noted in BLE    Therapeutic Exercises:    Ambulation: 2x20 feet with WW with April that improved to SBA  Active head/neck movement with STM to reduce pain and improve cervical mobility    Patient education  Pt educated on transfer technique, use of walker, muscle movement to reduce pain, use of heat for muscle strain.     Patient response to education:   Pt verbalized understanding Pt demonstrated skill Pt requires further education in this area   Yes Yes Reinforce      ASSESSMENT:    Conditions Requiring Skilled Therapeutic Intervention:    [x]Decreased strength     []Decreased ROM  [x]Decreased functional mobility  [x]Decreased balance   [x]Decreased endurance   []Decreased posture  []Decreased sensation  []Decreased coordination   []Decreased vision  [x]Decreased safety awareness   []Increased pain       Comments: The pt was seen sitting up in a chair and reported severe neck pain that was made worse with all cervical motion. The pt was instructed on pain free ROM and was able to follow instruction well, provided gentle STM as well. The pt required assistance to stand, reported her daughter assists her at home, the pt is anxious. Ambulated on RA, noted to be 92% after ambulation, informed nursing. The pt is anxious to return home, daughter is at bedside and expresses the same, pt appears to be at or very near functional baseline. Treatment:  Patient practiced and was instructed in the following treatment:     Transfer training: verbal cues for hand placement sit to stand transfer and assistance for anterior weight shift in order to facilitate initiation of the stand.  Gait training: verbal cues for positioning within walker and need for walker to increase Hawkins and safety with functional mobility.  Cervical ROM and STM to increase ROM and improve Hawkins as well as willingness to move      Patient's/ family goals   1. To return home    Prognosis is good for reaching above PT goals. Patient and or family understand(s) diagnosis, prognosis, and plan of care.   Yes    PHYSICAL THERAPY PLAN OF CARE:    PT POC is established based on physician order and patient diagnosis     Referring provider/PT Order:  DARIELA Love-  Diagnosis:  Nausea & vomiting [R11.2]  Fever, unspecified [R50.9]  Bacteremia [R78.81]  Specific instructions for next treatment:  Progress ambulation    Current Treatment Recommendations:     [x] Strengthening to improve independence with functional mobility   [] ROM to improve independence with functional mobility   [x] Balance Training to improve static/dynamic balance and to reduce fall risk  [x] Endurance Training to improve activity tolerance during functional mobility   [x] Transfer Training to improve safety and independence with all functional transfers   [x] Gait Training to improve gait mechanics, endurance and assess need for appropriate assistive device  [x] Stair Training in preparation for safe discharge home and/or into the community   [] Positioning to prevent skin breakdown and contractures  [x] Safety and Education Training   [] Patient/Caregiver Education   [] HEP  [] Other     PT long term treatment goals are located in above grid    Frequency of treatments: 2-5x/week x 1-2 weeks. Time in  1140  Time out  1205    Total Treatment Time  10 minutes     Evaluation Time includes thorough review of current medical information, gathering information on past medical history/social history and prior level of function, completion of standardized testing/informal observation of tasks, assessment of data and education on plan of care and goals. CPT codes:  [] Low Complexity PT evaluation 89235  [x] Moderate Complexity PT evaluation 36216  [] High Complexity PT evaluation 83413  [] PT Re-evaluation 38430  [] Gait training 67229 0 minutes  [] Manual therapy 82800 0 minutes  [x] Therapeutic activities 33397 10 minutes  [] Therapeutic exercises 23796 0 minutes  [] Neuromuscular reeducation 18835 0 minutes     Emely Stern.  [address]  [id number]

## 2021-11-26 NOTE — PROGRESS NOTES
temporary dialysis catheter  -had PENELOPE on 11/23 which was negative for any evidence of endocarditis     #Back pain  -has resolved.      #ESRD on HD  -nephrology on board  -Tunneled dialysis catheter removed and now has temporary dialysis catheter in place  -having regular dialysis.     #Type 2 diabetes mellitus  -on ISS. -On Lantus. -Accuchecks ACHS     #Anemia of chronic disease:  - Hb today is 7.7, down from 9 yesterday.   -transfuse if Hb <7      #CAD: On aspirin and statin as well as Plavix     DVT prophylaxis: Heparin    DISPOSITION:   Discharge was held on 11/26 because of hypotension. Reevaluate at noon time and possible discharge home today.     Medications:  REVIEWED DAILY    Infusion Medications    sodium chloride      dextrose       Scheduled Medications    [START ON 12/2/2021] ceFAZolin  3,000 mg IntraVENous Weekly - Thursday    [START ON 11/27/2021] ceFAZolin  2,000 mg IntraVENous Once per day on Tue Sat    aspirin  81 mg Oral Daily    atorvastatin  40 mg Oral Daily    carvedilol  12.5 mg Oral BID WC    clopidogrel  75 mg Oral Daily    insulin lispro  3 Units SubCUTAneous TID AC    insulin glargine  20 Units SubCUTAneous Daily    sodium chloride flush  10 mL IntraVENous 2 times per day    heparin (porcine)  5,000 Units SubCUTAneous 3 times per day     PRN Meds: simethicone, HYDROcodone-acetaminophen, cyclobenzaprine, sodium chloride flush, sodium chloride, promethazine **OR** ondansetron, polyethylene glycol, acetaminophen **OR** acetaminophen, glucose, dextrose, glucagon (rDNA), dextrose, trimethobenzamide    Labs:     Recent Labs     11/24/21  0710 11/25/21  0536 11/26/21  0448   WBC 9.8 12.6* 11.7*   HGB 7.6* 7.8* 7.9*   HCT 23.8* 24.9* 24.9*    194 199       Recent Labs     11/24/21  0710 11/25/21  0536 11/26/21  0448    135 135   K 4.1 4.6 4.5   CL 94* 97* 93*   CO2 24 21* 22   BUN 50* 37* 54*   CREATININE 7.1* 6.2* 7.9*   CALCIUM 7.6* 7.4* 7.5*   PHOS 5.6*  --   -- Recent Labs     11/25/21  0536 11/26/21  0448   PROT 6.1* 6.3*   ALKPHOS 123* 129*   ALT <5 <5   AST 19 20   BILITOT 0.3 0.3       No results for input(s): INR in the last 72 hours. No results for input(s): [place] in the last 72 hours. Chronic labs:    Lab Results   Component Value Date    INR 1.2 11/18/2021       Radiology: REVIEWED DAILY    +++++++++++++++++++++++++++++++++++++++++++++++++  Andra Mcgrath MD  [facility] Physician - [address] [place] [address]  +++++++++++++++++++++++++++++++++++++++++++++++++  NOTE: This report was transcribed using voice recognition software. Every effort was made to ensure accuracy; however, inadvertent computerized transcription errors may be present.

## 2021-11-26 NOTE — CARE COORDINATION
Discharge order noted. Met with patient and daughter in room to discuss transition of care. I informed her that once she establishes with a PCP she can have them order home health care if she chooses. She voiced understanding and she said she should be good without it for now. She is an HD patient on T-TH-S  am chair time @ [address] [place] [address]  on [facility] phone# [phone] and fax# [phone]. I called to let them know that patient is discharging home today and that she will need IV antibiotics with dialysis. IV antibiotic script faxed to [facility] Patient's daughter will transport her home today.   Pauline Montero RN CM

## 2021-11-26 NOTE — DISCHARGE SUMMARY
Hospitalist Discharge Summary    Patient ID: Josiah Wu   Patient : 1955  Patient's PCP: No primary care provider on file. Admit Date: 2021   Admitting Physician: Lisandro Hanson DO    Discharge Date:  2021   Discharge Physician: Eleni Cartagena MD   Discharge Condition: Stable  Discharge Disposition: [facility] course in brief:    Patient admitted on 2021 for nausea and fever.  Blood cultures were positive for Staph aureus.  Vascular surgery was consulted for removal of tunneled dialysis catheter.  Serial blood cultures are still remain positive for staph aureus.  Vascular surgery also on board.  ID also on board.  had [place] Gadiels  which was negative for any evidence of endocarditis. Right femoral temporary dialysis catheter removed on  and on the same day right IJ tunnel hemodialysis catheter inserted. ID recommendations on discharge are as below that needs to be followed up:  · MSSA bacteremia    · S/P HD cath removal- CLABSI  · Leukocytosis   · ESRD- HD   ·    · Back pain-Rule out discitis vertebral osteo-     PLAN:  · Continue Ancef- 2 grams -2 grams- 3 grams- has been ordered for 4 weeks  · Med rec complete  · Check cultures  · PENELOPE-No PENELOPE indication of endocarditis;  Mild MR, TR, and AV sclerosis  · Monitor labs- repeat serial blood cultures ordered   · Okay for discharge; outpatient follow-up closely for the back pain and possible bone scan or CT if symptoms progress  · Back pain muscle related      Patient walked without any difficulty with breathing, hypoxia, dizziness.     Consults:   IP CONSULT TO INTERNAL MEDICINE  IP CONSULT TO NEPHROLOGY  IP CONSULT TO VASCULAR SURGERY  IP CONSULT TO INFECTIOUS DISEASES  IP CONSULT TO VASCULAR SURGERY    Discharge Diagnoses:    #Staph aureus bacteremia  -repeat blood cultures still positive for Staph  -On IV ancef  -ID on board  -Dialysis catheter was removed.  Now has temporary dialysis catheter  -had PENELOPE on  which was negative for any evidence of endocarditis     #Back pain  -has resolved.      #ESRD on HD  -nephrology on board  -Tunneled dialysis catheter removed and now has temporary dialysis catheter in place  -having regular dialysis.     #Type 2 diabetes mellitus  -on ISS. -On Lantus. -Accuchecks ACHS     #Anemia of chronic disease:  - Hb today is 7.7, down from 9 yesterday.   -transfuse if Hb <7      #CAD: On aspirin and statin as well as Plavix     DVT prophylaxis: Heparin       Discharge Instructions / Follow up:    No future appointments. Continued appropriate risk factor modification of blood pressure, diabetes and serum lipids will remain essential to reducing risk of future atherosclerotic development    Activity: activity as tolerated    Significant labs:  CBC:   Recent Labs     11/24/21  0710 11/25/21 0536 11/26/21 0448   WBC 9.8 12.6* 11.7*   RBC 2.37* 2.42* 2.48*   HGB 7.6* 7.8* 7.9*   HCT 23.8* 24.9* 24.9*   .4* 102.9* 100.4*   RDW 13.4 13.5 13.5    194 199     BMP:   Recent Labs     11/24/21  0710 11/25/21 0536 11/26/21 0448    135 135   K 4.1 4.6 4.5   CL 94* 97* 93*   CO2 24 21* 22   BUN 50* 37* 54*   CREATININE 7.1* 6.2* 7.9*   MG 2.2  --   --    PHOS 5.6*  --   --      LFT:  Recent Labs     11/25/21 0536 11/26/21 0448   PROT 6.1* 6.3*   ALKPHOS 123* 129*   ALT <5 <5   AST 19 20   BILITOT 0.3 0.3     PT/INR: No results for input(s): INR, APTT in the last 72 hours. BNP: No results for input(s): BNP in the last 72 hours.   Hgb A1C: No results found for: LABA1C  Folate and B12: No results found for: [place], No results found for: FOLATE  Thyroid Studies: No results found for: TSH, V7QQSMZ, H4MNIJK, THYROIDAB    Urinalysis:    Lab Results   Component Value Date    NITRU Negative 11/18/2021    WBCUA 1-3 11/18/2021    BACTERIA FEW 11/18/2021    RBCUA 0-1 11/18/2021    BLOODU SMALL 11/18/2021    SPECGRAV 1.020 11/18/2021    GLUCOSEU 500 11/18/2021       Imaging:  ECHO Transesophageal    Result Date: 11/23/2021  Transesophageal Echocardiography Report (PENELOPE)   Demographics   Patient Name       Thuan Crabtree         Gender               Female                     RADHA   Medical Record     [id number]      Room Number          8409  Number   Account #          [de-identified]     Procedure Date       11/23/2021   Corporate ID                     Ordering Physician   Accession Number   [id number]    Referring Physician   Date of Birth      1955    Sonographer          Jasen WINTER   Age                73 year(s)    Interpreting         Hitesh Hernandez MD                                   Physician                                    Any Other  Procedure Type of Study   PENELOPE procedure:Transesophageal Echo (PENELOPE). Procedure Date Date: 11/23/2021 Start: 08:55 AM Study Location: CVL Technical Quality: Adequate visualization Indications:R/O Vegetation. Patient Status: Routine Height: 64 inches Weight: 247 pounds BSA: 2.14 m^2 BMI: 42.4 kg/m^2 Rhythm: Within normal limits HR: 77 bpm BP: 153/86 mmHg O2 Saturation: 98 % PENELOPE Performed By: the attending and the sonographer  Type of Anesthesia: Moderate sedation  Allergies   - Morphine.   - Penicillins. Findings   Left Ventricle  Normal left ventricular chamber size and systolic function. Right Ventricle  Normal right ventricle structure and function. Left Atrium  Left atrium is mildly enlarged. Interatrial septum intact, focal area of calcification near mid septum. No thrombus in left atrium or left atrial appendage. Right Atrium  Moderately enlarged right atrium size. Mitral Valve  Normal mitral valve structure. There is mild mitral regurgitation. No mitral valve prolapse. There is mild tricuspid regurgitation. Tricuspid Valve  Normal tricuspid valve structure. There is mild tricuspid regurgitation. There is mild tricuspid regurgitation. Mild Pulmonary hypertension, RVSP 40mmHg.    Aortic Valve  The aortic valve appears moderately sclerotic. No hemodynamically significant aortic stenosis, mean gradient 14mmHg, peak  gradient 24mmHg, ERENDIRA 2.1cm2 by VTI, Vmax and planimetry. There is mild tricuspid regurgitation. Pulmonic Valve  The pulmonic valve was not well visualized. Pericardial Effusion  No evidence of pericardial effusion. Pericardium appears normal.   Pleural Effusion  No evidence of pleural effusion. Aorta  Normal aortic root size and ascending aorta. Miscellaneous  The inferior vena cava diameter is normal with normal respiratory  variation. Conclusions   Summary  No PENELOPE indication of Endocarditis. Normal left ventricular chamber size and systolic function. Left atrium is mildly enlarged. Interatrial septum intact, focal area of calcification near mid septum. No thrombus in left atrium or left atrial appendage. Normal right ventricle size and function. Moderately enlarged right atrium size. The aortic valve appears moderately sclerotic. No hemodynamically significant aortic stenosis, mean gradient 14mmHg, peak  gradient 24mmHg, ERENDIRA 2.1cm2 by VTI, Vmax and planimetry. There is mild tricuspid regurgitation. Mild Pulmonary hypertension, RVSP 40mmHg. Normal aortic root size. No evidence of pericardial effusion. No comparison study available.    Signature   ----------------------------------------------------------------  Electronically signed by Hitesh Hernandez MD(Interpreting  physician) on 11/23/2021 05:30 PM  ----------------------------------------------------------------  M-Mode/2D Measurements & Calculations   CO: 8.89 l/min  CI: 4.15 l/m*m^2            AV Area (2D): 2.1 cm^2                               LVOT: 2.3 cm  Doppler Measurements & Calculations    AV Peak Velocity: 2.46    LVOT Peak Velocity: 1.29 m/s   m/s                       LVOT Mean Velocity: 0.87 m/s   AV Peak Gradient: 24.29   LVOT Peak Gradient: 6.7 mmHgLVOT Mean Gradient:   mmHg                      3.6 mmHg   AV Mean Velocity: is no abnormal fluid collection. There is no free intraperitoneal air. Bones/Soft Tissues: There is a moderate-sized fat containing umbilical hernia. There is no acute abnormality seen involving visualized osseous structures. 1.  There is no nephrolithiasis, hydronephrosis, hydroureter or other evidence for acute obstructive uropathy. 2. Probable cholelithiasis. 3. Mild diverticulosis. No acute diverticulitis seen. 4. Moderate-sized fat containing umbilical hernia. XR CHEST PORTABLE    Result Date: 11/24/2021  EXAMINATION: ONE XRAY VIEW OF THE CHEST 11/24/2021 11:42 am COMPARISON: One-view chest x-ray 11/17/2021 HISTORY: ORDERING SYSTEM PROVIDED HISTORY: line placement TECHNOLOGIST PROVIDED HISTORY: Reason for exam:->line placement What reading provider will be dictating this exam?->CRC FINDINGS: There is moderate enlargement of the cardiac silhouette, which may be exaggerated by AP technique. The mediastinal contours are within normal limits. There is interval removal of the previously noted large caliber right internal jugular central venous catheter. A new large caliber right subclavian central venous catheter terminates in the region of the right atrium. The catheter appears intact with mild luminal narrowing in the region of the thoracic outlet. The lungs are hypoinflated. No significant pleural effusions or pneumothorax. Osseous degenerative changes are present. 1. Large caliber right subclavian central venous catheter terminates in the region of the right atrium with concern for pinching of the catheter between the clavicle and 1st rib, which can result in suboptimal catheter function (often affected by differences in patient arm position) and increased risk for catheter fracture. 2. No evidence of pneumothorax or significant pleural effusions. RECOMMENDATION: \"Pinch off\" sign of the right subclavian central venous catheter.   Recommend removal.     XR CHEST PORTABLE    Result Date: 11/17/2021  EXAMINATION: ONE XRAY VIEW OF THE CHEST 11/17/2021 10:46 pm COMPARISON: None. HISTORY: ORDERING SYSTEM PROVIDED HISTORY: fever TECHNOLOGIST PROVIDED HISTORY: Reason for exam:->fever What reading provider will be dictating this exam?->CRC FINDINGS: The heart is mildly enlarged. Pulmonary vessels are congested. There is no pneumothorax. The costophrenic angles are clear. Dialysis catheter in place. Mild cardiomegaly and suspect pulmonary vascular congestion. Discharge Medications:      Medication List      START taking these medications    * ceFAZolin  infusion  Commonly known as: ANCEF  Infuse 3,000 mg intravenously once a week Compound per protocol. Every Saturday after HD. Stop date 12/23/21. * ceFAZolin  infusion  Commonly known as: ANCEF  Infuse 2,000 mg intravenously Twice a Week for 28 days Compound per protocol. Tuesday and Thursday after HD. Stop date 12/23/2021         * This list has 2 medication(s) that are the same as other medications prescribed for you. Read the directions carefully, and ask your doctor or other care provider to review them with you.             CONTINUE taking these medications    aspirin 81 MG EC tablet     atorvastatin 40 MG tablet  Commonly known as: LIPITOR     carvedilol 12.5 MG tablet  Commonly known as: COREG     clopidogrel 75 MG tablet  Commonly known as: PLAVIX     NovoLOG 100 UNIT/ML injection vial  Generic drug: insulin aspart     Toujeo Max SoloStar 300 UNIT/ML Sopn  Generic drug: Insulin Glargine (2 Unit Dial)           Where to Get Your Medications      You can get these medications from any pharmacy    Bring a paper prescription for each of these medications  · ceFAZolin  infusion  · ceFAZolin  infusion         Time Spent on discharge is more than 45 minutes in the examination, evaluation, counseling and review of medications and discharge plan.    +++++++++++++++++++++++++++++++++++++++++++++++++  Jalen Irene MD  [facility], [place] Physician - [address], [place]  +++++++++++++++++++++++++++++++++++++++++++++++++  NOTE: This report was transcribed using voice recognition software. Every effort was made to ensure accuracy; however, inadvertent computerized transcription errors may be present.

## 2021-11-26 NOTE — PROGRESS NOTES
9. 8 12.6* 11.7*   HGB 7.6* 7.8* 7.9*   HCT 23.8* 24.9* 24.9*   .4* 102.9* 100.4*    194 199     Recent Labs     11/24/21  0710 11/25/21  0536 11/26/21  0448    135 135   K 4.1 4.6 4.5   CL 94* 97* 93*   CO2 24 21* 22   MG 2.2  --   --    PHOS 5.6*  --   --    BUN 50* 37* 54*   CREATININE 7.1* 6.2* 7.9*   ALT  --  <5 <5   AST  --  19 20   BILITOT  --  0.3 0.3   ALKPHOS  --  123* 129*       No results found for: LABPROT    ASSESSMENT       1. End-stage renal disease, on hemodialysis thrice weekly. 2.  Gram-positive cocci bacteremia/MSSA on Ancef per ID  3. Anemia of chronic disease. 4.  Hypertension. 5.  Secondary hyperparathyroidism/mineral bone disease due to ESRD    PENELOPE no vegetation   Clinically improved  Undergoing line placement    RECOMMENDATIONS  Abx per ID service   Consideration for MRI spine . Would avoid gadolinum .    If deemed clinically necessary to give gadolinium then will plan dialysis for three days in a row (to start within 1-2 hours of receiving gadolinium )  Continue IHD support for solute and volume clearance per holiday schedule this week, Monday, Wednesday, Saturday  Follow labs, BP    Okay for discharge from renal standpoint    Electronically signed by Leslye Abarca MD on 11/26/2021

## 2021-11-27 LAB — BLOOD CULTURE, ROUTINE: NORMAL

## 2021-11-30 ENCOUNTER — HOSPITAL ENCOUNTER (INPATIENT)
Age: 66
LOS: 3 days | Discharge: HOME | DRG: 314 | End: 2021-12-03
Attending: EMERGENCY MEDICINE | Admitting: INTERNAL MEDICINE
Payer: MEDICARE

## 2021-11-30 ENCOUNTER — APPOINTMENT (OUTPATIENT)
Dept: GENERAL RADIOLOGY | Age: 66
DRG: 314 | End: 2021-11-30
Payer: MEDICARE

## 2021-11-30 ENCOUNTER — APPOINTMENT (OUTPATIENT)
Dept: CT IMAGING | Age: 66
DRG: 314 | End: 2021-11-30
Payer: MEDICARE

## 2021-11-30 DIAGNOSIS — M54.2 NECK PAIN: ICD-10-CM

## 2021-11-30 DIAGNOSIS — K92.2 GASTROINTESTINAL HEMORRHAGE, UNSPECIFIED GASTROINTESTINAL HEMORRHAGE TYPE: ICD-10-CM

## 2021-11-30 DIAGNOSIS — A41.9 SEPTICEMIA (HCC): Primary | ICD-10-CM

## 2021-11-30 DIAGNOSIS — M48.02 CERVICAL STENOSIS OF SPINAL CANAL: ICD-10-CM

## 2021-11-30 PROBLEM — I10 ESSENTIAL HYPERTENSION: Status: ACTIVE | Noted: 2021-11-30

## 2021-11-30 PROBLEM — E78.5 HYPERLIPIDEMIA: Status: ACTIVE | Noted: 2021-11-30

## 2021-11-30 PROBLEM — N18.9 ANEMIA IN CKD (CHRONIC KIDNEY DISEASE): Status: ACTIVE | Noted: 2021-11-30

## 2021-11-30 PROBLEM — E11.8 DIABETES MELLITUS TYPE 2 WITH COMPLICATIONS (HCC): Status: ACTIVE | Noted: 2021-11-30

## 2021-11-30 PROBLEM — D63.1 ANEMIA IN CKD (CHRONIC KIDNEY DISEASE): Status: ACTIVE | Noted: 2021-11-30

## 2021-11-30 PROBLEM — B95.61 MSSA BACTEREMIA: Status: ACTIVE | Noted: 2021-11-18

## 2021-11-30 LAB
ABO/RH: NORMAL
ALBUMIN SERPL-MCNC: 3 G/DL (ref 3.5–5.2)
ALP BLD-CCNC: 125 U/L (ref 35–104)
ALT SERPL-CCNC: <5 U/L (ref 0–32)
ANION GAP SERPL CALCULATED.3IONS-SCNC: 16 MMOL/L (ref 7–16)
ANTIBODY SCREEN: NORMAL
APTT: 45.1 SEC (ref 24.5–35.1)
AST SERPL-CCNC: 16 U/L (ref 0–31)
BASOPHILS ABSOLUTE: 0.07 E9/L (ref 0–0.2)
BASOPHILS RELATIVE PERCENT: 0.4 % (ref 0–2)
BILIRUB SERPL-MCNC: 0.3 MG/DL (ref 0–1.2)
BUN BLDV-MCNC: 23 MG/DL (ref 6–23)
CALCIUM SERPL-MCNC: 8.2 MG/DL (ref 8.6–10.2)
CHLORIDE BLD-SCNC: 97 MMOL/L (ref 98–107)
CO2: 24 MMOL/L (ref 22–29)
CREAT SERPL-MCNC: 4.1 MG/DL (ref 0.5–1)
EKG ATRIAL RATE: 88 BPM
EKG P AXIS: 15 DEGREES
EKG P-R INTERVAL: 158 MS
EKG Q-T INTERVAL: 386 MS
EKG QRS DURATION: 96 MS
EKG QTC CALCULATION (BAZETT): 467 MS
EKG R AXIS: -10 DEGREES
EKG T AXIS: 38 DEGREES
EKG VENTRICULAR RATE: 88 BPM
EOSINOPHILS ABSOLUTE: 0.39 E9/L (ref 0.05–0.5)
EOSINOPHILS RELATIVE PERCENT: 2 % (ref 0–6)
GFR AFRICAN AMERICAN: 13
GFR NON-AFRICAN AMERICAN: 11 ML/MIN/1.73
GLUCOSE BLD-MCNC: 131 MG/DL (ref 74–99)
HCT VFR BLD CALC: 26.5 % (ref 34–48)
HEMOGLOBIN: 8.4 G/DL (ref 11.5–15.5)
IMMATURE GRANULOCYTES #: 0.64 E9/L
IMMATURE GRANULOCYTES %: 3.3 % (ref 0–5)
INR BLD: 1.1
LACTIC ACID: 1.2 MMOL/L (ref 0.5–2.2)
LIPASE: 17 U/L (ref 13–60)
LYMPHOCYTES ABSOLUTE: 1.45 E9/L (ref 1.5–4)
LYMPHOCYTES RELATIVE PERCENT: 7.5 % (ref 20–42)
MCH RBC QN AUTO: 32.2 PG (ref 26–35)
MCHC RBC AUTO-ENTMCNC: 31.7 % (ref 32–34.5)
MCV RBC AUTO: 101.5 FL (ref 80–99.9)
METER GLUCOSE: 111 MG/DL (ref 74–99)
METER GLUCOSE: 177 MG/DL (ref 74–99)
MONOCYTES ABSOLUTE: 1.2 E9/L (ref 0.1–0.95)
MONOCYTES RELATIVE PERCENT: 6.2 % (ref 2–12)
NEUTROPHILS ABSOLUTE: 15.6 E9/L (ref 1.8–7.3)
NEUTROPHILS RELATIVE PERCENT: 80.6 % (ref 43–80)
PDW BLD-RTO: 13.9 FL (ref 11.5–15)
PLATELET # BLD: 247 E9/L (ref 130–450)
PMV BLD AUTO: 9.9 FL (ref 7–12)
POTASSIUM REFLEX MAGNESIUM: 4 MMOL/L (ref 3.5–5)
PRO-BNP: ABNORMAL PG/ML (ref 0–125)
PROTHROMBIN TIME: 11.7 SEC (ref 9.3–12.4)
RBC # BLD: 2.61 E12/L (ref 3.5–5.5)
SODIUM BLD-SCNC: 137 MMOL/L (ref 132–146)
TOTAL PROTEIN: 7 G/DL (ref 6.4–8.3)
TROPONIN, HIGH SENSITIVITY: 484 NG/L (ref 0–9)
TROPONIN, HIGH SENSITIVITY: 531 NG/L (ref 0–9)
WBC # BLD: 19.4 E9/L (ref 4.5–11.5)

## 2021-11-30 PROCEDURE — 6360000002 HC RX W HCPCS: Performed by: STUDENT IN AN ORGANIZED HEALTH CARE EDUCATION/TRAINING PROGRAM

## 2021-11-30 PROCEDURE — 1200000000 HC SEMI PRIVATE

## 2021-11-30 PROCEDURE — 85730 THROMBOPLASTIN TIME PARTIAL: CPT

## 2021-11-30 PROCEDURE — 86850 RBC ANTIBODY SCREEN: CPT

## 2021-11-30 PROCEDURE — 85610 PROTHROMBIN TIME: CPT

## 2021-11-30 PROCEDURE — 2580000003 HC RX 258: Performed by: NURSE PRACTITIONER

## 2021-11-30 PROCEDURE — 6370000000 HC RX 637 (ALT 250 FOR IP): Performed by: STUDENT IN AN ORGANIZED HEALTH CARE EDUCATION/TRAINING PROGRAM

## 2021-11-30 PROCEDURE — 99223 1ST HOSP IP/OBS HIGH 75: CPT | Performed by: INTERNAL MEDICINE

## 2021-11-30 PROCEDURE — 86901 BLOOD TYPING SEROLOGIC RH(D): CPT

## 2021-11-30 PROCEDURE — C9113 INJ PANTOPRAZOLE SODIUM, VIA: HCPCS | Performed by: NURSE PRACTITIONER

## 2021-11-30 PROCEDURE — 6370000000 HC RX 637 (ALT 250 FOR IP): Performed by: NURSE PRACTITIONER

## 2021-11-30 PROCEDURE — APPSS45 APP SPLIT SHARED TIME 31-45 MINUTES: Performed by: NURSE PRACTITIONER

## 2021-11-30 PROCEDURE — 93010 ELECTROCARDIOGRAM REPORT: CPT | Performed by: INTERNAL MEDICINE

## 2021-11-30 PROCEDURE — 6360000002 HC RX W HCPCS: Performed by: NURSE PRACTITIONER

## 2021-11-30 PROCEDURE — 87040 BLOOD CULTURE FOR BACTERIA: CPT

## 2021-11-30 PROCEDURE — 86900 BLOOD TYPING SEROLOGIC ABO: CPT

## 2021-11-30 PROCEDURE — 83605 ASSAY OF LACTIC ACID: CPT

## 2021-11-30 PROCEDURE — 72125 CT NECK SPINE W/O DYE: CPT

## 2021-11-30 PROCEDURE — 2580000003 HC RX 258: Performed by: STUDENT IN AN ORGANIZED HEALTH CARE EDUCATION/TRAINING PROGRAM

## 2021-11-30 PROCEDURE — 71045 X-RAY EXAM CHEST 1 VIEW: CPT

## 2021-11-30 PROCEDURE — 83690 ASSAY OF LIPASE: CPT

## 2021-11-30 PROCEDURE — 82962 GLUCOSE BLOOD TEST: CPT

## 2021-11-30 PROCEDURE — 84484 ASSAY OF TROPONIN QUANT: CPT

## 2021-11-30 PROCEDURE — 80053 COMPREHEN METABOLIC PANEL: CPT

## 2021-11-30 PROCEDURE — 85025 COMPLETE CBC W/AUTO DIFF WBC: CPT

## 2021-11-30 PROCEDURE — C9113 INJ PANTOPRAZOLE SODIUM, VIA: HCPCS | Performed by: STUDENT IN AN ORGANIZED HEALTH CARE EDUCATION/TRAINING PROGRAM

## 2021-11-30 PROCEDURE — 93005 ELECTROCARDIOGRAM TRACING: CPT | Performed by: STUDENT IN AN ORGANIZED HEALTH CARE EDUCATION/TRAINING PROGRAM

## 2021-11-30 PROCEDURE — 99283 EMERGENCY DEPT VISIT LOW MDM: CPT

## 2021-11-30 PROCEDURE — 36415 COLL VENOUS BLD VENIPUNCTURE: CPT

## 2021-11-30 PROCEDURE — 83880 ASSAY OF NATRIURETIC PEPTIDE: CPT

## 2021-11-30 RX ORDER — ACETAMINOPHEN 650 MG/1
650 SUPPOSITORY RECTAL EVERY 6 HOURS PRN
Status: DISCONTINUED | OUTPATIENT
Start: 2021-11-30 | End: 2021-12-03 | Stop reason: HOSPADM

## 2021-11-30 RX ORDER — ACETAMINOPHEN 325 MG/1
650 TABLET ORAL EVERY 6 HOURS PRN
Status: DISCONTINUED | OUTPATIENT
Start: 2021-11-30 | End: 2021-12-03 | Stop reason: HOSPADM

## 2021-11-30 RX ORDER — SODIUM CHLORIDE 9 MG/ML
25 INJECTION, SOLUTION INTRAVENOUS PRN
Status: DISCONTINUED | OUTPATIENT
Start: 2021-11-30 | End: 2021-12-03 | Stop reason: HOSPADM

## 2021-11-30 RX ORDER — SODIUM CHLORIDE 9 MG/ML
10 INJECTION INTRAVENOUS EVERY 12 HOURS
Status: DISCONTINUED | OUTPATIENT
Start: 2021-11-30 | End: 2021-12-03 | Stop reason: HOSPADM

## 2021-11-30 RX ORDER — SODIUM CHLORIDE 0.9 % (FLUSH) 0.9 %
5-40 SYRINGE (ML) INJECTION EVERY 12 HOURS SCHEDULED
Status: DISCONTINUED | OUTPATIENT
Start: 2021-11-30 | End: 2021-12-03 | Stop reason: HOSPADM

## 2021-11-30 RX ORDER — ATORVASTATIN CALCIUM 40 MG/1
40 TABLET, FILM COATED ORAL DAILY
Status: DISCONTINUED | OUTPATIENT
Start: 2021-11-30 | End: 2021-12-03 | Stop reason: HOSPADM

## 2021-11-30 RX ORDER — ONDANSETRON 4 MG/1
4 TABLET, ORALLY DISINTEGRATING ORAL EVERY 8 HOURS PRN
Status: DISCONTINUED | OUTPATIENT
Start: 2021-11-30 | End: 2021-12-03 | Stop reason: HOSPADM

## 2021-11-30 RX ORDER — PANTOPRAZOLE SODIUM 40 MG/10ML
40 INJECTION, POWDER, LYOPHILIZED, FOR SOLUTION INTRAVENOUS EVERY 12 HOURS
Status: DISCONTINUED | OUTPATIENT
Start: 2021-11-30 | End: 2021-12-03 | Stop reason: HOSPADM

## 2021-11-30 RX ORDER — CARVEDILOL 6.25 MG/1
12.5 TABLET ORAL 2 TIMES DAILY WITH MEALS
Status: DISCONTINUED | OUTPATIENT
Start: 2021-11-30 | End: 2021-12-03 | Stop reason: HOSPADM

## 2021-11-30 RX ORDER — HYDROCODONE BITARTRATE AND ACETAMINOPHEN 5; 325 MG/1; MG/1
1 TABLET ORAL ONCE
Status: COMPLETED | OUTPATIENT
Start: 2021-11-30 | End: 2021-11-30

## 2021-11-30 RX ORDER — SODIUM CHLORIDE 0.9 % (FLUSH) 0.9 %
5-40 SYRINGE (ML) INJECTION PRN
Status: DISCONTINUED | OUTPATIENT
Start: 2021-11-30 | End: 2021-12-03 | Stop reason: HOSPADM

## 2021-11-30 RX ORDER — ONDANSETRON 2 MG/ML
4 INJECTION INTRAMUSCULAR; INTRAVENOUS EVERY 6 HOURS PRN
Status: DISCONTINUED | OUTPATIENT
Start: 2021-11-30 | End: 2021-12-03 | Stop reason: HOSPADM

## 2021-11-30 RX ADMIN — HYDROCODONE BITARTRATE AND ACETAMINOPHEN 1 TABLET: 5; 325 TABLET ORAL at 11:22

## 2021-11-30 RX ADMIN — Medication 10 ML: at 21:00

## 2021-11-30 RX ADMIN — INSULIN LISPRO 3 UNITS: 100 INJECTION, SOLUTION INTRAVENOUS; SUBCUTANEOUS at 17:00

## 2021-11-30 RX ADMIN — ATORVASTATIN CALCIUM 40 MG: 40 TABLET, FILM COATED ORAL at 16:54

## 2021-11-30 RX ADMIN — SODIUM CHLORIDE, PRESERVATIVE FREE 10 ML: 5 INJECTION INTRAVENOUS at 17:00

## 2021-11-30 RX ADMIN — SODIUM CHLORIDE 80 MG: 9 INJECTION, SOLUTION INTRAVENOUS at 16:58

## 2021-11-30 RX ADMIN — PANTOPRAZOLE SODIUM 40 MG: 40 INJECTION, POWDER, FOR SOLUTION INTRAVENOUS at 16:53

## 2021-11-30 ASSESSMENT — PAIN SCALES - GENERAL
PAINLEVEL_OUTOF10: 4
PAINLEVEL_OUTOF10: 9
PAINLEVEL_OUTOF10: 0
PAINLEVEL_OUTOF10: 0
PAINLEVEL_OUTOF10: 6

## 2021-11-30 ASSESSMENT — ENCOUNTER SYMPTOMS
VOMITING: 0
EYE REDNESS: 0
SINUS PRESSURE: 0
DIARRHEA: 0
WHEEZING: 0
SHORTNESS OF BREATH: 0
EYE PAIN: 0
ABDOMINAL DISTENTION: 0
NAUSEA: 1
SORE THROAT: 0
COUGH: 0
BACK PAIN: 0
EYE DISCHARGE: 0
BLOOD IN STOOL: 1

## 2021-11-30 ASSESSMENT — PAIN DESCRIPTION - DESCRIPTORS: DESCRIPTORS: ACHING;DISCOMFORT

## 2021-11-30 ASSESSMENT — PAIN - FUNCTIONAL ASSESSMENT: PAIN_FUNCTIONAL_ASSESSMENT: ACTIVITIES ARE NOT PREVENTED

## 2021-11-30 ASSESSMENT — PAIN DESCRIPTION - FREQUENCY: FREQUENCY: CONTINUOUS

## 2021-11-30 ASSESSMENT — PAIN DESCRIPTION - PROGRESSION: CLINICAL_PROGRESSION: NOT CHANGED

## 2021-11-30 ASSESSMENT — PAIN DESCRIPTION - ONSET: ONSET: ON-GOING

## 2021-11-30 ASSESSMENT — PAIN DESCRIPTION - PAIN TYPE: TYPE: ACUTE PAIN

## 2021-11-30 ASSESSMENT — PAIN DESCRIPTION - LOCATION: LOCATION: SHOULDER

## 2021-11-30 ASSESSMENT — PAIN DESCRIPTION - ORIENTATION: ORIENTATION: RIGHT

## 2021-11-30 NOTE — CONSULTS
Gastroenterology Consult Note   Galdino Odom [facility] with Jeyson Santizo M.D. Consult Note      Date of Service: 12/1/2021  Reason for Consult: gi bleed  Requesting Physician: Dr Adelene Osgood: Neck pain, dark tarry stools, fatigue, and difficulty ambulating    History Obtained From:  patient, electronic medical record    HISTORY OF PRESENT ILLNESS:       Viral Brooks is a 73 y.o. female with significant past medical history of ESRD on HD, Anemia, HTN, MSSA Bacteremia 2/2 to CLABSI, and HLD admitted via ED from dialysis for neck pain and dark tarry stools associated with fatigue and difficulty ambulating due to pain. Pt recently admitted 11/18/2021 with MSSA Bacteremia 2/2 to CLABSI. Her HD catheter was removed, she was treated with antibiotics per ID, discharged home 11/26/21 on IV Ancef for 4 weeks. She reportedly had c/o neck pain that admission prior to going home and was reportedly told after a CT was done that it was arthritis for which she was taking Tylenol and using a heating pad without full relief, she states there is concern of infection in that area. She was having difficulty ambulating due to the pain. She reports fatigue, chills, and noted shortness of breath yesterday AM. She states she noted dark stools that were loose yesterday, she reports she was having diarrhea for a couple days of 3/day associated with a lot of gas in her stomach stating diarrhea was yellow water. She reports yesterday she had a dark tarry stool but today it was formed and brown. Patient reports she was having fatigue and some shortness of breath but states the shortness of breath was because she was hyperventilating due to significant neck pain right side posterior neck radiating to her right shoulder right arm and back described as \"sharp like as appendectomy pain to just an ache pain, 10/10 when I came here. \"  Patient states her pain was \"excruciating. \"  Pt states she had had labs with dialysis, was found to be anemic, her treatment was stopped one hour early and she was sent to ED for evaluation. Patient states she is from [place], only lived here for 7 months, reports her last colonoscopy was 3 years ago in [place], she denies polyps stating that she was told to have a repeat in 10 years. Patient states she had EGD a long time ago, she denies ulcers or any abnormalities. Patient states she had 1 day of chills prior to coming to the hospital, she did not check her temperature but did not think she had a fever. She denies abdominal pain, nausea, vomiting, hematemesis, hematochezia, fever, or weight loss. Admission labs: H&H 8.4 & 26.4; RBC 2.61; WBC 19.4; MCHC 31.7; neut 80.6%; lymph 7.5%; Abs neut 15.60; abs lymph 1.45; abs mono 1.20; INR 1.1; aPTT 45.1; Alb 3.0; ; Pro-BNP 58,450; Troponin 531; Cl 97; Creat 4.1; ; Ca 8.2. CT abdomen/pelvis-1. There is no nephrolithiasis, hydronephrosis, hydroureter or other evidence for acute obstructive uropathy. 2. Probable cholelithiasis. 3. Mild diverticulosis. No acute diverticulitis seen. 4. Moderate-sized fat containing umbilical hernia. Other imaging below. Consultation for gi bleed. Currently patient reports after her pain medication her neck pain is 7/10, she is requesting a heating pad and will oblige. Patient denies abdominal pain. Denies BM this a.m. Labs today: H&H 7.6 & 24; .1; RBC 2.35; MCHC 31.7; lymphs 12.2%; absolute neuts 8.60; absolute lymphs 1.39; albumin 3.1; alk phos 109; glucose 129; chloride 96; BUN 32; creatinine 5.3; glucose 129; calcium 8.2; phosphorus 5.2; albumin 3.1; alk phos 109.     Past Medical History:        Diagnosis Date    Brain aneurysm     CLABSI (central line-associated bloodstream infection) 11/19/2021    Diabetes mellitus ([facility] [address]     ESRD on hemodialysis ([facility] [address] 11/19/2021    H/O heart artery stent     Hyperlipidemia     Hypertension      Past Surgical History:        Procedure Laterality Date    Covid.\"    REVIEW OF SYSTEMS:    Aside from what was mentioned in the PMH and HPI, essentially unremarkable, all others negative. PHYSICAL EXAM:      Vitals:    /70   Pulse 72   Temp 98.2 °F (36.8 °C) (Oral)   Resp 16   Ht 5' 4\" (1.626 m)   Wt 252 lb 8 oz (114.5 kg)   SpO2 92%   BMI 43.34 kg/m²       CONSTITUTIONAL:  awake, alert, cooperative, pale, sitting in chair in no apparent distress, and appears stated age  EYES:  pupils equal, round and reactive to light, sclera anicteric and conjunctiva pale  ENT:  normocephalic, oral pharynx with moist mucous membranes  NECK:  supple   LUNGS:  clear to auscultation bilaterally. CARDIOVASCULAR:  regular rate and rhythm, no murmur noted; 2+ pulses; 3+ BLE edema/lymphedema, patient has POLO hose intact;  Hemodialysis catheter right chest was dry dressing intact  ABDOMEN:  normal bowel sounds, soft, large, non-tender, occult to discern masses or hepatosplenomegaly and lieu of body habitus  MUSCULOSKELETAL:  full range of motion noted  motor strength is 5 out of 5 all extremities bilaterally  NEUROLOGIC:  Mental Status Exam:  Level of Alertness:   awake  Orientation:   person, place, time  Motor Exam:  Motor exam is symmetrical 5 out of 5 all extremities bilaterally  SKIN: Pale skin color, normal texture, turgor    DATA:    CBC with Differential:    Lab Results   Component Value Date    WBC 11.4 12/01/2021    RBC 2.35 12/01/2021    HGB 7.6 12/01/2021    HCT 24.0 12/01/2021     12/01/2021    .1 12/01/2021    MCH 32.3 12/01/2021    MCHC 31.7 12/01/2021    RDW 14.0 12/01/2021    LYMPHOPCT 12.2 12/01/2021    MONOPCT 7.4 12/01/2021    BASOPCT 0.4 12/01/2021    MONOSABS 0.85 12/01/2021    LYMPHSABS 1.39 12/01/2021    EOSABS 0.34 12/01/2021    BASOSABS 0.04 12/01/2021     CMP:    Lab Results   Component Value Date     12/01/2021    K 5.0 12/01/2021    CL 96 12/01/2021    CO2 25 12/01/2021    BUN 32 12/01/2021    CREATININE 5.3 12/01/2021    GFRAA 10 12/01/2021    LABGLOM 8 12/01/2021    GLUCOSE 129 12/01/2021    PROT 6.9 12/01/2021    LABALBU 3.1 12/01/2021    CALCIUM 8.2 12/01/2021    BILITOT 0.3 12/01/2021    ALKPHOS 109 12/01/2021    AST 15 12/01/2021    ALT <5 12/01/2021     Hepatic Function Panel:    Lab Results   Component Value Date    ALKPHOS 109 12/01/2021    ALT <5 12/01/2021    AST 15 12/01/2021    PROT 6.9 12/01/2021    BILITOT 0.3 12/01/2021    LABALBU 3.1 12/01/2021     PT/INR:    Lab Results   Component Value Date    PROTIME 11.7 11/30/2021    INR 1.1 11/30/2021     PTT:    Lab Results   Component Value Date    APTT 45.1 11/30/2021   [APTT}  ECHO Transesophageal    Result Date: 11/23/2021  Transesophageal Echocardiography Report (PENELOPE)   Demographics   Patient Name       Jefry Alicia         Gender               Female                     RADHA   Medical Record     [id number]      Room Number          8409  Number   Account #          [de-identified]     Procedure Date       11/23/2021   Corporate ID                     Ordering Physician   Accession Number   [id number]    Referring Physician   Date of Birth      1955    Sonographer          Wyatt Lilly [facility]   Age                73 year(s)    Interpreting         Ned Chen MD                                   Physician                                    Any Other  Procedure Type of Study   PENELOPE procedure:Transesophageal Echo (PENELOPE). Procedure Date Date: 11/23/2021 Start: 08:55 AM Study Location: CVL Technical Quality: Adequate visualization Indications:R/O Vegetation. Patient Status: Routine Height: 64 inches Weight: 247 pounds BSA: 2.14 m^2 BMI: 42.4 kg/m^2 Rhythm: Within normal limits HR: 77 bpm BP: 153/86 mmHg O2 Saturation: 98 % PENELOPE Performed By: the attending and the sonographer  Type of Anesthesia: Moderate sedation  Allergies   - Morphine.   - Penicillins. Findings   Left Ventricle  Normal left ventricular chamber size and systolic function.    Right Ventricle  Normal right ventricle structure and function. Left Atrium  Left atrium is mildly enlarged. Interatrial septum intact, focal area of calcification near mid septum. No thrombus in left atrium or left atrial appendage. Right Atrium  Moderately enlarged right atrium size. Mitral Valve  Normal mitral valve structure. There is mild mitral regurgitation. No mitral valve prolapse. There is mild tricuspid regurgitation. Tricuspid Valve  Normal tricuspid valve structure. There is mild tricuspid regurgitation. There is mild tricuspid regurgitation. Mild Pulmonary hypertension, RVSP 40mmHg. Aortic Valve  The aortic valve appears moderately sclerotic. No hemodynamically significant aortic stenosis, mean gradient 14mmHg, peak  gradient 24mmHg, ERENDIRA 2.1cm2 by VTI, Vmax and planimetry. There is mild tricuspid regurgitation. Pulmonic Valve  The pulmonic valve was not well visualized. Pericardial Effusion  No evidence of pericardial effusion. Pericardium appears normal.   Pleural Effusion  No evidence of pleural effusion. Aorta  Normal aortic root size and ascending aorta. Miscellaneous  The inferior vena cava diameter is normal with normal respiratory  variation. Conclusions   Summary  No PENELOPE indication of Endocarditis. Normal left ventricular chamber size and systolic function. Left atrium is mildly enlarged. Interatrial septum intact, focal area of calcification near mid septum. No thrombus in left atrium or left atrial appendage. Normal right ventricle size and function. Moderately enlarged right atrium size. The aortic valve appears moderately sclerotic. No hemodynamically significant aortic stenosis, mean gradient 14mmHg, peak  gradient 24mmHg, ERENDIRA 2.1cm2 by VTI, Vmax and planimetry. There is mild tricuspid regurgitation. Mild Pulmonary hypertension, RVSP 40mmHg. Normal aortic root size. No evidence of pericardial effusion. No comparison study available.    Signature ----------------------------------------------------------------  Electronically signed by Miguel Bradley MD(Interpreting  physician) on 11/23/2021 05:30 PM  ----------------------------------------------------------------  M-Mode/2D Measurements & Calculations   CO: 8.89 l/min  CI: 4.15 l/m*m^2            AV Area (2D): 2.1 cm^2                               LVOT: 2.3 cm  Doppler Measurements & Calculations    AV Peak Velocity: 2.46    LVOT Peak Velocity: 1.29 m/s   m/s                       LVOT Mean Velocity: 0.87 m/s   AV Peak Gradient: 24.29   LVOT Peak Gradient: 6.7 mmHgLVOT Mean Gradient:   mmHg                      3.6 mmHg   AV Mean Velocity: 1.8 m/s   AV Mean Gradient: 13.9   mmHg   AV VTI: 52.4 cm           TR Velocity:2.95 m/s   AV Area (Continuity):2.2  TR Gradient:34.86 mmHg   cm^2    LVOT VTI: 27.8 cm  [email or web address] [id number]    CT ABDOMEN PELVIS WO CONTRAST Additional Contrast? None    Result Date: 11/18/2021  EXAMINATION: CT OF THE ABDOMEN AND PELVIS WITHOUT CONTRAST 11/17/2021 11:56 pm TECHNIQUE: CT of the abdomen and pelvis was performed without the administration of intravenous contrast. Multiplanar reformatted images are provided for review. Dose modulation, iterative reconstruction, and/or weight based adjustment of the mA/kV was utilized to reduce the radiation dose to as low as reasonably achievable. COMPARISON: None. HISTORY: ORDERING SYSTEM PROVIDED HISTORY: Flank pain fever TECHNOLOGIST PROVIDED HISTORY: Reason for exam:->Flank pain fever Additional Contrast?->None Decision Support Exception - unselect if not a suspected or confirmed emergency medical condition->Emergency Medical Condition (MA) What reading provider will be dictating this exam?->CRC FINDINGS: Lower Chest: The visualized portions of the lung bases are clear. Organs:  Within the limitations of a noncontrast exam, the visualized liver, spleen, pancreas, adrenal glands and kidneys demonstrate no significant abnormality. There is no nephrolithiasis, hydronephrosis, hydroureter or other evidence for acute obstructive uropathy. GI/Bowel: There are no findings of intestinal obstruction. The appendix is not visualized. There is probable cholelithiasis there is mild diverticulosis involving descending colon. There is no acute diverticulitis seen. Pelvis:  Bladder is unremarkable in appearance. There is no abnormal pelvic mass or fluid collection seen. Peritoneum/Retroperitoneum: There is no abdominal aortic aneurysm. There is no abnormal lymphadenopathy seen. There is no abnormal fluid collection. There is no free intraperitoneal air. Bones/Soft Tissues: There is a moderate-sized fat containing umbilical hernia. There is no acute abnormality seen involving visualized osseous structures. 1.  There is no nephrolithiasis, hydronephrosis, hydroureter or other evidence for acute obstructive uropathy. 2. Probable cholelithiasis. 3. Mild diverticulosis. No acute diverticulitis seen. 4. Moderate-sized fat containing umbilical hernia. CT Cervical Spine WO Contrast    Result Date: 11/30/2021  EXAMINATION: CT OF THE CERVICAL SPINE WITHOUT CONTRAST 11/30/2021 10:53 am TECHNIQUE: CT of the cervical spine was performed without the administration of intravenous contrast. Multiplanar reformatted images are provided for review. Dose modulation, iterative reconstruction, and/or weight based adjustment of the mA/kV was utilized to reduce the radiation dose to as low as reasonably achievable. COMPARISON: None. HISTORY: ORDERING SYSTEM PROVIDED HISTORY: neck pain TECHNOLOGIST PROVIDED HISTORY: Reason for exam:->neck pain Decision Support Exception - unselect if not a suspected or confirmed emergency medical condition->Emergency Medical Condition (MA) FINDINGS: The ring of C1 is intact as is the dense.   There is no compression fracture of the cervical spine. No jumped or perched facet is noted. Advanced multilevel degenerative disc and degenerative joint disease is noted. There is bilateral neural foraminal narrowing seen at the C4-5 and C5-6 levels. The prevertebral soft tissues are unremarkable. The airway is widely patent. Images through the lung apices are negative for a pneumothorax. 1. There is no acute compression fracture or subluxation of the cervical spine. 2. Advanced multilevel degenerative disc and degenerative joint disease. 3. If the patient's pain persists dedicated follow-up MRI is recommended for further evaluation. XR CHEST PORTABLE    Result Date: 11/30/2021  EXAMINATION: ONE XRAY VIEW OF THE CHEST PORTABLE SUPINE 11/30/2021 10:37 am COMPARISON: 11/24/2021 HISTORY: ORDERING SYSTEM PROVIDED HISTORY: epigastric pain TECHNOLOGIST PROVIDED HISTORY: Reason for exam:->epigastric pain FINDINGS: The right subclavian dialysis catheter appears unchanged in position. Again seen is short length mild kinking of the catheter at the level of the right 1st rib and the degree of catheter narrowing appears less conspicuous than prior. Otherwise, no significant change. Cardiomegaly and mild pulmonary venous congestion. No acute pulmonary infiltrate or pleural effusion. No pneumothorax. 1.  Cardiomegaly and mild pulmonary venous congestion, unchanged. No pneumonia identified. 2.  Right subclavian dialysis catheter with kinking, as above. XR CHEST PORTABLE    Result Date: 11/24/2021  EXAMINATION: ONE XRAY VIEW OF THE CHEST 11/24/2021 11:42 am COMPARISON: One-view chest x-ray 11/17/2021 HISTORY: ORDERING SYSTEM PROVIDED HISTORY: line placement TECHNOLOGIST PROVIDED HISTORY: Reason for exam:->line placement What reading provider will be dictating this exam?->CRC FINDINGS: There is moderate enlargement of the cardiac silhouette, which may be exaggerated by AP technique. The mediastinal contours are within normal limits.   There is interval removal of the previously noted large caliber right internal jugular central venous catheter. A new large caliber right subclavian central venous catheter terminates in the region of the right atrium. The catheter appears intact with mild luminal narrowing in the region of the thoracic outlet. The lungs are hypoinflated. No significant pleural effusions or pneumothorax. Osseous degenerative changes are present. 1. Large caliber right subclavian central venous catheter terminates in the region of the right atrium with concern for pinching of the catheter between the clavicle and 1st rib, which can result in suboptimal catheter function (often affected by differences in patient arm position) and increased risk for catheter fracture. 2. No evidence of pneumothorax or significant pleural effusions. RECOMMENDATION: \"Pinch off\" sign of the right subclavian central venous catheter. Recommend removal.     XR CHEST PORTABLE    Result Date: 11/17/2021  EXAMINATION: ONE XRAY VIEW OF THE CHEST 11/17/2021 10:46 pm COMPARISON: None. HISTORY: ORDERING SYSTEM PROVIDED HISTORY: fever TECHNOLOGIST PROVIDED HISTORY: Reason for exam:->fever What reading provider will be dictating this exam?->CRC FINDINGS: The heart is mildly enlarged. Pulmonary vessels are congested. There is no pneumothorax. The costophrenic angles are clear. Dialysis catheter in place. Mild cardiomegaly and suspect pulmonary vascular congestion. IMPRESSION:  · Anemia, macrocytic suspicious for GI blood loss, melanotic stool  · ESRD on HD  · Elevated alk phos, mildly elevated  · Right neck pain radiating to right shoulder, arm, and down back-defer to PCP  · MSSA bacteremia associated to CLABSI -ID following (hemodialysis catheter removed and replaced)  · Leukocytosis -ID following    RECOMMENDATIONS:    · Serial H&H, transfuse per PCP  · EGD today with Dr. Jaycob Donato. Procedure details for EGD discussed in detail.  Complications

## 2021-11-30 NOTE — CONSULTS
[address] [place] [address] [facility]  Consultation Note     Admit Date: 2021 10:14 AM    Reason for Consult:   Bacteremia    Attending Physician:  Griselda Skates, DO    HISTORY OF PRESENT ILLNESS:             The history is obtained from extensive review of available past medical records. The patient is a 73 y.o. female who is known to the ID service. The patient presented to [facility] on 2021 with fevers and chills. Blood cultures grew MSSA. She was treated with Vancomycin. Her HD catheter was removed. Seen by Dr. Wang Da Silva. Antibiotics were changed over to Cefazolin with dialysis. PENELOPE was negative for direct stations. She was discharged to complete 4 weeks of Cefazolin on 2021. Patient presents to the ED at [facility] where she was not actually feeling well. She felt weak and somewhat lightheaded. She was not having any fevers relates that she had had a very dark stool which was unusual for her. Knows that her hemoglobin was low so she was sent to the ED. She was also noted to have an elevated white count. The patient said that she was having some neck pain well at [facility]. Neck pain is still present. Past Medical History:        Diagnosis Date    Brain aneurysm     CLABSI (central line-associated bloodstream infection) 2021    Diabetes mellitus [facility] [address]     ESRD on hemodialysis ([facility] [facility] 2021    H/O heart artery stent     Hyperlipidemia     Hypertension      2021. The patient presented to [facility] with fevers and chills. Blood cultures grew MSSA. She was treated with Vancomycin. Her HD catheter was removed. Seen by Dr. Wang Da Silva. Antibiotics were changed over to Cefazolin with dialysis. PENELOPE was negative for vegetation. She was discharged to complete 4 weeks of Cefazolin.     Past Surgical History:        Procedure Laterality Date    CARDIAC SURGERY       SECTION      VASCULAR SURGERY N/A Social Gatherings with Friends and Family: Not on file    Attends Quaker Services: Not on file    Active Member of Clubs or Organizations: Not on file    Attends Club or Organization Meetings: Not on file    Marital Status: Not on file   Intimate Partner Violence:     Fear of Current or Ex-Partner: Not on file    Emotionally Abused: Not on file    Physically Abused: Not on file    Sexually Abused: Not on file   Housing Stability:     Unable to Pay for Housing in the Last Year: Not on file    Number of [place] in the Last Year: Not on file    Unstable Housing in the Last Year: Not on file      Pets: Dog  Travel: No  The patient works as an  from home. She lives with her daughter    Family History:       Problem Relation Age of Onset    Coronary Art Dis Mother     Coronary Art Dis Father     Coronary Art Dis Brother    . Otherwise non-pertinent to the chief complaint. REVIEW OF SYSTEMS:    Constitutional: Negative for fevers, chills, diaphoresis  Neurologic: Negative   Psychiatric: Negative  Rheumatologic: Negative   Endocrine: Negative  Hematologic: Negative  Immunologic: SARS-CoV-2 vaccine. Due for second  ENT: Negative  Respiratory: Negative   Cardiovascular: Negative  GI: Negative  : Negative  Musculoskeletal: As in the HPI  Skin: No rashes. PHYSICAL EXAM:    Vitals:   BP (!) 145/63   Pulse 98   Temp 97.7 °F (36.5 °C) (Oral)   Resp 20   Ht 5' 4\" (1.626 m)   Wt 252 lb 8 oz (114.5 kg)   SpO2 98%   BMI 43.34 kg/m²   Constitutional: The patient is awake, alert, and oriented. Lying in bed in the ED. She is in no distress. Daughter present. Skin: Warm and dry. No rashes were noted. HEENT: Eyes show round, and reactive pupils. No jaundice. Moist mucous membranes, no ulcerations, no thrush. Neck: Supple to movements. No lymphadenopathy. Chest: No use of accessory muscles to breathe. Symmetrical expansion. Auscultation reveals no wheezing, crackles, or rhonchi. Right tunneled HD catheter. Cardiovascular: S1 and S2 are rhythmic and regular. No murmurs appreciated. Abdomen: Positive bowel sounds to auscultation. Benign to palpation. No masses felt. No hepatosplenomegaly. Extremities: Bilateral lower extremity edema. Venous stasis dermatitis. Lines: peripheral      CBC+dif:  Recent Labs     11/30/21  1150   WBC 19.4*   HGB 8.4*   HCT 26.5*   .5*      NEUTROABS 15.60*     No results found for: CRP   No results found for: CRP  Lab Results   Component Value Date    SEDRATE 103 (H) 11/21/2021     Lab Results   Component Value Date    ALT <5 11/30/2021    AST 16 11/30/2021    ALKPHOS 125 (H) 11/30/2021    BILITOT 0.3 11/30/2021     Lab Results   Component Value Date     11/30/2021    K 4.0 11/30/2021    CL 97 11/30/2021    CO2 24 11/30/2021    BUN 23 11/30/2021    CREATININE 4.1 11/30/2021    GFRAA 13 11/30/2021    LABGLOM 11 11/30/2021    GLUCOSE 131 11/30/2021    PROT 7.0 11/30/2021    LABALBU 3.0 11/30/2021    CALCIUM 8.2 11/30/2021    BILITOT 0.3 11/30/2021    ALKPHOS 125 11/30/2021    AST 16 11/30/2021    ALT <5 11/30/2021       Lab Results   Component Value Date    PROTIME 11.7 11/30/2021    INR 1.1 11/30/2021       No results found for: TSH    Lab Results   Component Value Date    COLORU Yellow 11/18/2021    PHUR 6.0 11/18/2021    WBCUA 1-3 11/18/2021    RBCUA 0-1 11/18/2021    BACTERIA FEW 11/18/2021    CLARITYU Clear 11/18/2021    SPECGRAV 1.020 11/18/2021    LEUKOCYTESUR Negative 11/18/2021    UROBILINOGEN 0.2 11/18/2021    BILIRUBINUR Negative 11/18/2021    BLOODU SMALL 11/18/2021    GLUCOSEU 500 11/18/2021       No results found for: HCO3, BE, O2SAT, PH, THGB, PCO2, PO2, TCO2  Radiology:  Noted    Microbiology:  Pending  No results for input(s): BC in the last 72 hours. No results for input(s): ORG in the last 72 hours. No results for input(s): Clarnce Lax in the last 72 hours.   No results for input(s): STREPNEUMAGU in the last 72 hours. No results for input(s): LP1UAG in the last 72 hours. No results for input(s): ASO in the last 72 hours. No results for input(s): CULTRESP in the last 72 hours. No results for input(s): PROCAL in the last 72 hours. Assessment:  · CLABSI secondary to tunneled HD catheter infection. Status post removal and replacement  · MSSA bacteremia associated to CLABSI  · Neck pain. Possible vertebral osteomyelitis versus epidural abscess  · Probable GI bleed  · Leukocytosis associated with GI bleed    Plan:    · Continue Cefazolin Tuesdays, Thursdays, Fridays with a 2-2-3 regimen until at least 1/1/2022 (total 6 weeks since last negative blood culture)  · Check cultures, baseline ESR, CRP  · MRI of the cervical spine  · Will follow with you    Thank you for having us see this patient in consultation.   Discussed with hospitalist service    Vinicio Dunaway MD  3:28 PM  11/30/2021

## 2021-11-30 NOTE — H&P
[facility] History and Physical      CHIEF COMPLAINT:  Neck Pain, Dark Stools    History of Present Illness:  Patient is a 73year old female with a past medical history of hypertension, hyperlipidemia and ESRD on HD. She was recently hospitalized at [facility] from 11/18-11/26 with an MSSA Bacteremia secondary to CLABSI from an infected [facility] s/p HD catheter removal.  She was discharged home with plans to continue IV Ancef with HD TTS for 4 weeks treatment. She presents to the ER this morning from dialysis with complaints of neck pain and dark tarry stools. She reports that following discharge she has had constant pain in her neck that is severe and limits her ability to move. She reports that she was having this pain at the hospital and was told it was arthritis. She reports that since her discharge she has been taking Tylenol and using a heating pad with minimal improvement in her symptoms. She reports that she has also been very fatigued and is having difficulty walking due to the pain. She also admits to having some increased shortness of breath this morning but her dialysis treatment was ended about 40 minutes early on Saturday. She denies any chest discomfort, abdominal pain, nausea or vomiting. She does admit to having chills at home but has been unable to check her temperature as she does not have a working thermometer. She reports that since discharge she has also had some loose stools which were dark in color. This morning she went to dialysis as scheduled and per patient was told by the dialysis nurses that her hemoglobin was \"too low\". Her dialysis treatment was ended 1 hour early and she was told to go to the ER for evaluation. Patient expresses that she is unsure what her hemoglobin was with dialysis but was told it was \"in the 7's\".   Given the continued pain in her neck as well as the concerns expressed by the dialysis nurses she came to the ER for further evaluation. While in the ER lab work was obtained and noted stable H&H. Her WBC were noted to be increased compared to her prior admission. Given the neck pain a CT of the cervical spine was obtained and showed multilevel degenerative disc and joint disease. Due to the worsening leukocytosis as well as the concern for GI bleeding she was admitted for further management. Currently she is seen laying in bed awake and alert in no distress. She reports persistent pain in her neck. She was given a dose of Norco however has had no improvement. Daughter is at the bedside and expresses concerns over her antibiotic regimen reporting that patient is only getting antibiotics on Tuesday and Thursday and did not receive a dose with HD on Saturday. Much of the HPI has been obtained through discussion with patient/daughter as well as extensive chart review. All of patient's past medical history, past surgical history, social history, family history and allergies reviewed and verified with patient for accuracy. Patient is felt to be a good historian. Informant(s) for H&P: Patient    REVIEW OF SYSTEMS:  Full 12 point ROS negative unless mentioned in the HPI      PMH:  Past Medical History:   Diagnosis Date    Brain aneurysm     CLABSI (central line-associated bloodstream infection) 2021    [facility] [address]     ESRD on hemodialysis ([facility] [address] [facility] 2021    H/O heart artery stent     Hyperlipidemia     Hypertension        Surgical History:  Past Surgical History:   Procedure Laterality Date    CARDIAC SURGERY       SECTION      VASCULAR SURGERY N/A 2021    CATHETER INSERTION  TUNNELED HEMODIALYSIS, WITH REMOVAL OF TEMPORARY CATHETER performed by Lawrence Sandhoff, MD at [facility] OR       Medications Prior to Admission:    Prior to Admission medications    Medication Sig Start Date End Date Taking?  Authorizing Provider   ceFAZolin (ANCEF) infusion Infuse 2,000 mg intravenously Twice a Week for 28 days Compound per protocol. Tuesday and Thursday after HD. Stop date 12/23/2021 11/25/21 12/23/21  TAWNYA Walker CNP   ceFAZolin (ANCEF) infusion Infuse 3,000 mg intravenously once a week Compound per protocol. Every Saturday after HD. Stop date 12/23/21. 11/23/21 12/23/21  TAWNYA Walker CNP   atorvastatin (LIPITOR) 40 MG tablet Take 40 mg by mouth daily    Historical Provider, MD   carvedilol (COREG) 12.5 MG tablet Take 12.5 mg by mouth 2 times daily (with meals)    Historical Provider, MD   clopidogrel (PLAVIX) 75 MG tablet Take 75 mg by mouth daily    Historical Provider, MD   aspirin 81 MG EC tablet Take 81 mg by mouth daily    Historical Provider, MD   Insulin Glargine, 2 Unit Dial, (TOUJEO MAX SOLOSTAR) 300 UNIT/ML SOPN Inject 25 Units into the skin daily    Historical Provider, MD   insulin aspart (NOVOLOG) 100 UNIT/ML injection vial Inject 3 Units into the skin 3 times daily (before meals)    Historical Provider, MD       Allergies:    Morphine, Other, and Pcn [penicillins]    Social History:    reports that she quit smoking about 4 years ago. She smoked 1.00 pack per day. She has never used smokeless tobacco. She reports that she does not drink alcohol and does not use drugs. Family History:   family history includes Coronary Art Dis in her brother, father, and mother.        PHYSICAL EXAM:  Vitals:  BP (!) 140/68   Pulse 98   Temp 97.7 °F (36.5 °C) (Oral)   Resp 20   Ht 5' 4\" (1.626 m)   Wt 252 lb 8 oz (114.5 kg)   SpO2 98%   BMI 43.34 kg/m²   General Appearance: awake and alert, oriented x 3, in no distress  Skin: warm and dry, no rashes or lesions noted  Head: normocephalic and atraumatic  Eyes: pupils equal, round, and reactive to light, conjunctivae normal  Neck: supple and non tender without mass, no lymphadenopathy noted  Pulmonary/Chest: clear throughout, respirations even and non-labored on room air  Cardiovascular: regular rate and rhythm, S1S2 present, no murmur noted, right chest wall TDC  Abdomen: obese, soft, non-tender, non-distended, normal bowel sounds  Musculoskeletal: 4/5 strength to all extremities, pain with ROM to the neck, no other joint swelling/tenderness noted  Extremities: 1+ edema to BLE with tubi  in place, no cyanosis or clubbing noted  Neurologic: no cranial nerve deficit and speech normal        LABS:  Recent Labs     11/30/21  1150      K 4.0   CL 97*   CO2 24   BUN 23   CREATININE 4.1*   GLUCOSE 131*   CALCIUM 8.2*       Recent Labs     11/30/21  1150   WBC 19.4*   RBC 2.61*   HGB 8.4*   HCT 26.5*   .5*   MCH 32.2   MCHC 31.7*   RDW 13.9      MPV 9.9       No results for input(s): POCGLU in the last 72 hours. Radiology:   XR CHEST PORTABLE   Final Result   1. Cardiomegaly and mild pulmonary venous congestion, unchanged. No   pneumonia identified. 2.  Right subclavian dialysis catheter with kinking, as above. CT Cervical Spine WO Contrast   Final Result   1. There is no acute compression fracture or subluxation of the cervical   spine. 2. Advanced multilevel degenerative disc and degenerative joint disease. 3. If the patient's pain persists dedicated follow-up MRI is recommended for   further evaluation. EKG: Sinus Rhythm with PVC's    ASSESSMENT:      Active Problems:    MSSA bacteremia    CLABSI (central line-associated bloodstream infection)    ESRD on hemodialysis (HCC)    Upper GI bleed    Essential hypertension    Hyperlipidemia    Neck pain    Anemia in CKD (chronic kidney disease)  Resolved Problems:    * No resolved hospital problems. *      PLAN:    1. CLABSI with MSSA Bacteremia: s/p TDC removal, PENELOPE done during prior admission and negative for vegetation, continue IV Ancef x 4 weeks treatment, ID has been consulted given worsening leukocytosis  2.   Possible Upper GI Bleed: patient with dark tarry stools, hemoglobin is currently stable, will continue PPI BID for now, monitor H&H, full liquid diet pending GI evaluation  3. Neck Pain: was present during prior admission, CT cervical spine with degenerative changes, discussed with ID and will need MRI w/wo contrast to evaluate for osteomyelitis/discitis; will need to coordinate with HD  4. Leukocytosis: worsened since recent discharge, possibly related to infectious process, check ESR/CRP, check cultures, continue IV Ancef for now, ID has been consulted  5. ESRD: continue HD TTS, last two sessions had to be cut short, will defer management to Renal  6. Anemia in CKD: could also be a component of blood loss given dark tarry stools, will monitor H&H, transfuse to keep hemoglobin >7.0  7. Essential Hypertension: continue medications and monitor BP closely, adjust as indicated  8. Hyperlipidemia: continue statin  9. CAD: holding ASA/Plavix for now given concern for GI bleed, if H&H remains stable can resume  10. Physical Debility: patient reports difficulty ambulating and fatigue, will ask PT/OT to evaluate      Code Status: Full Code  DVT prophylaxis: SCD's given concern for GI bleeding      NOTE: This report was transcribed using voice recognition software. Every effort was made to ensure accuracy; however, inadvertent computerized transcription errors may be present. Electronically signed by Doyle Escudero CNP on 11/30/2021 at 2:33 PM

## 2021-11-30 NOTE — ED PROVIDER NOTES
Respiratory: Negative for cough, shortness of breath and wheezing. Cardiovascular: Negative for chest pain. Gastrointestinal: Positive for blood in stool and nausea. Negative for abdominal distention, diarrhea and vomiting. Genitourinary: Negative for dysuria and frequency. Musculoskeletal: Positive for neck pain. Negative for arthralgias and back pain. Skin: Negative for rash and wound. Neurological: Negative for weakness and headaches. Hematological: Negative for adenopathy. All other systems reviewed and are negative. Physical Exam  Vitals and nursing note reviewed. Constitutional:       General: She is not in acute distress. Appearance: Normal appearance. HENT:      Head: Normocephalic and atraumatic. Nose: No congestion or rhinorrhea. Mouth/Throat:      Mouth: Mucous membranes are moist.      Pharynx: Oropharynx is clear. Eyes:      Extraocular Movements: Extraocular movements intact. Pupils: Pupils are equal, round, and reactive to light. Neck:      Comments: No meningeal signs noted. Cardiovascular:      Rate and Rhythm: Normal rate and regular rhythm. Heart sounds: No murmur heard. No gallop. Pulmonary:      Effort: Pulmonary effort is normal. No respiratory distress. Breath sounds: No wheezing, rhonchi or rales. Abdominal:      General: Abdomen is flat. Palpations: Abdomen is soft. There is no mass. Tenderness: There is no abdominal tenderness. There is no guarding. Hernia: No hernia is present. Musculoskeletal:         General: No swelling or signs of injury. Normal range of motion. Cervical back: Normal range of motion. Tenderness (Mild paraspinal tenderness) present. No muscular tenderness. Skin:     General: Skin is warm and dry. Capillary Refill: Capillary refill takes less than 2 seconds. Neurological:      General: No focal deficit present.       Mental Status: She is alert and oriented to person, place, and time. Mental status is at baseline. Comments: Muscle strength 5 out of 5 to bilateral upper and lower extremities, sensation intact light touch. Psychiatric:         Mood and Affect: Mood normal.         Behavior: Behavior normal.          Procedures       MDM  Number of Diagnoses or Management Options  Gastrointestinal hemorrhage, unspecified gastrointestinal hemorrhage type  Neck pain  Septicemia ([facility] [address]  Diagnosis management comments: Patient is a 78-year-old female past medical history of ESRD, diabetes, hyperlipidemia and hypertension. Patient presents with chief complaint of dark tarry stools, nausea as well as neck pain. Vital signs stable on presentation supple mild tachycardia. Physical exam heart mildly tachycardic, regular rhythm, lungs clear to auscultation bilaterally, abdomen soft nontender. On examination neck there is mild paraspinal tenderness, no significant rigidity, no meningeal signs noted. Patient is neurovascular intact. EKG obtained demonstrate no acute ischemic changes. Laboratory work obtained CBC demonstrated mild leukocytosis 19.4, hemoglobin 8.4, CMP obtained demonstrated mildly elevated alk phos of 125, INR 1.1, APTT 45.1, lactic acid 1.2, lipase 17, proBNP 58,450. Troponin was obtained initially 531 on repeat 484. Chest x-ray demonstrated cardiomegaly with mild pulmonary vascular congestion. CT scan of the neck was obtained without contrast demonstrate no acute abnormalities. Findings concerning for sepsis in the setting of recent bacteremia as well as possible GI bleed patient given [place], infectious disease consult placed for antibiotic management. Patient given Protonix. Decision made to admit patient. Case discussed with hospitalist who agreed to admit patient. Plan of care discussed with patient including admission, all questions were answered, patient was in agreement plan of care and admitted to the hospital in stable condition.        Amount and/or Complexity of Data Reviewed  Clinical lab tests: ordered and reviewed  Tests in the radiology section of CPT®: ordered and reviewed  Decide to obtain previous medical records or to obtain history from someone other than the patient: yes    Risk of Complications, Morbidity, and/or Mortality  Presenting problems: moderate  Diagnostic procedures: moderate  Management options: moderate    Patient Progress  Patient progress: stable             --------------------------------------------- PAST HISTORY ---------------------------------------------  Past Medical History:  has a past medical history of Brain aneurysm, CLABSI (central line-associated bloodstream infection), Diabetes mellitus ([facility] [address] ESRD on hemodialysis ([facility] [address] H/O heart artery stent, Hyperlipidemia, and Hypertension. Past Surgical History:  has a past surgical history that includes Cardiac surgery;  section; and vascular surgery (N/A, 2021). Social History:  reports that she quit smoking about 4 years ago. She smoked 1.00 pack per day. She has never used smokeless tobacco. She reports that she does not drink alcohol and does not use drugs. Family History: family history includes Coronary Art Dis in her brother, father, and mother. The patients home medications have been reviewed.     Allergies: Morphine, Other, and Pcn [penicillins]    -------------------------------------------------- RESULTS -------------------------------------------------    LABS:  Results for orders placed or performed during the hospital encounter of 21   CBC Auto Differential   Result Value Ref Range    WBC 19.4 (H) 4.5 - 11.5 E9/L    RBC 2.61 (L) 3.50 - 5.50 E12/L    Hemoglobin 8.4 (L) 11.5 - 15.5 g/dL    Hematocrit 26.5 (L) 34.0 - 48.0 %    .5 (H) 80.0 - 99.9 fL    MCH 32.2 26.0 - 35.0 pg    MCHC 31.7 (L) 32.0 - 34.5 %    RDW 13.9 11.5 - 15.0 fL    Platelets 963 834 - 043 E9/L    MPV 9.9 7.0 - 12.0 fL    Neutrophils % 80.6 (H) 43.0 - 80.0 % Immature Granulocytes % 3.3 0.0 - 5.0 %    Lymphocytes % 7.5 (L) 20.0 - 42.0 %    Monocytes % 6.2 2.0 - 12.0 %    Eosinophils % 2.0 0.0 - 6.0 %    Basophils % 0.4 0.0 - 2.0 %    Neutrophils Absolute 15.60 (H) 1.80 - 7.30 E9/L    Immature Granulocytes # 0.64 E9/L    Lymphocytes Absolute 1.45 (L) 1.50 - 4.00 E9/L    Monocytes Absolute 1.20 (H) 0.10 - 0.95 E9/L    Eosinophils Absolute 0.39 0.05 - 0.50 E9/L    Basophils Absolute 0.07 0.00 - 0.20 E9/L   Comprehensive Metabolic Panel w/ Reflex to MG   Result Value Ref Range    Sodium 137 132 - 146 mmol/L    Potassium reflex Magnesium 4.0 3.5 - 5.0 mmol/L    Chloride 97 (L) 98 - 107 mmol/L    CO2 24 22 - 29 mmol/L    Anion Gap 16 7 - 16 mmol/L    Glucose 131 (H) 74 - 99 mg/dL    BUN 23 6 - 23 mg/dL    CREATININE 4.1 (H) 0.5 - 1.0 mg/dL    GFR Non-African American 11 >=60 mL/min/1.73    GFR African American 13     Calcium 8.2 (L) 8.6 - 10.2 mg/dL    Total Protein 7.0 6.4 - 8.3 g/dL    Albumin 3.0 (L) 3.5 - 5.2 g/dL    Total Bilirubin 0.3 0.0 - 1.2 mg/dL    Alkaline Phosphatase 125 (H) 35 - 104 U/L    ALT <5 0 - 32 U/L    AST 16 0 - 31 U/L   Lipase   Result Value Ref Range    Lipase 17 13 - 60 U/L   Brain Natriuretic Peptide   Result Value Ref Range    Pro-BNP 58,450 (H) 0 - 125 pg/mL   Troponin   Result Value Ref Range    Troponin, High Sensitivity 531 (H) 0 - 9 ng/L   Protime-INR   Result Value Ref Range    Protime 11.7 9.3 - 12.4 sec    INR 1.1    APTT   Result Value Ref Range    aPTT 45.1 (H) 24.5 - 35.1 sec   Lactic Acid, Plasma   Result Value Ref Range    Lactic Acid 1.2 0.5 - 2.2 mmol/L   Troponin   Result Value Ref Range    Troponin, High Sensitivity 484 (H) 0 - 9 ng/L   EKG 12 Lead   Result Value Ref Range    Ventricular Rate 88 BPM    Atrial Rate 88 BPM    P-R Interval 158 ms    QRS Duration 96 ms    Q-T Interval 386 ms    QTc Calculation (Bazett) 467 ms    P Axis 15 degrees    R Axis -10 degrees    T Axis 38 degrees   TYPE AND SCREEN   Result Value Ref Range ABO/Rh B NEG     Antibody Screen NEG        RADIOLOGY:  XR CHEST PORTABLE   Final Result   1. Cardiomegaly and mild pulmonary venous congestion, unchanged. No   pneumonia identified. 2.  Right subclavian dialysis catheter with kinking, as above. CT Cervical Spine WO Contrast   Final Result   1. There is no acute compression fracture or subluxation of the cervical   spine. 2. Advanced multilevel degenerative disc and degenerative joint disease. 3. If the patient's pain persists dedicated follow-up MRI is recommended for   further evaluation. MRI CERVICAL SPINE W WO CONTRAST    (Results Pending)       EKG: This EKG is signed and interpreted by me. Rate: 88  Rhythm: Sinus  Interpretation: EKG obtained demonstrates sinus rhythm sinus arrhythmia with occasional PVCs, rate 88, left axis, , no acute ST segment changes. Comparison: stable as compared to patient's most recent EKG      ------------------------- NURSING NOTES AND VITALS REVIEWED ---------------------------  Date / Time Roomed:  11/30/2021 10:14 AM  ED Bed Assignment:  11/11    The nursing notes within the ED encounter and vital signs as below have been reviewed. Patient Vitals for the past 24 hrs:   BP Temp Temp src Pulse Resp SpO2 Height Weight   11/30/21 1425 (!) 145/63 -- -- 89 20 97 % -- --   11/30/21 1122 (!) 140/68 -- -- 98 20 98 % -- --   11/30/21 1030 (!) 166/63 97.7 °F (36.5 °C) Oral 107 20 97 % 5' 4\" (1.626 m) 252 lb 8 oz (114.5 kg)       Oxygen Saturation Interpretation: Normal    ------------------------------------------ PROGRESS NOTES ------------------------------------------  Re-evaluation(s):  Time: 1430  Patients symptoms show no change  Repeat physical examination is not changed    Counseling:  I have spoken with the patient and discussed todays results, in addition to providing specific details for the plan of care and counseling regarding the diagnosis and prognosis.   Their questions are answered at this time and they are agreeable with the plan of admission.    --------------------------------- ADDITIONAL PROVIDER NOTES ---------------------------------  Consultations:  Time: 7865. Spoke with hospitalist.  Discussed case. They will admit the patient. This patient's ED course included: a personal history and physicial examination and re-evaluation prior to disposition    This patient has remained hemodynamically stable during their ED course. Diagnosis:  1. Septicemia ([address]    2. Neck pain    3. Gastrointestinal hemorrhage, unspecified gastrointestinal hemorrhage type        Disposition:  Patient's disposition: Admit to telemetry  Patient's condition is stable. Patient was seen and evaluated by myself and my attending Gildardo Cogan, DO. Assessment and Plan discussed with attending provider, please see attestation for final plan of care.      DO Lino Whitehead DO  Resident  11/30/21 4569

## 2021-11-30 NOTE — ED NOTES
Bed: 11  Expected date:   Expected time:   Means of arrival:   Comments:  AMY Mccoy, JUDY  11/30/21 4703

## 2021-12-01 ENCOUNTER — ANESTHESIA EVENT (OUTPATIENT)
Dept: ENDOSCOPY | Age: 66
DRG: 314 | End: 2021-12-01
Payer: MEDICARE

## 2021-12-01 ENCOUNTER — ANESTHESIA (OUTPATIENT)
Dept: ENDOSCOPY | Age: 66
DRG: 314 | End: 2021-12-01
Payer: MEDICARE

## 2021-12-01 VITALS — OXYGEN SATURATION: 100 % | DIASTOLIC BLOOD PRESSURE: 52 MMHG | SYSTOLIC BLOOD PRESSURE: 107 MMHG

## 2021-12-01 LAB
ALBUMIN SERPL-MCNC: 3.1 G/DL (ref 3.5–5.2)
ALP BLD-CCNC: 109 U/L (ref 35–104)
ALT SERPL-CCNC: <5 U/L (ref 0–32)
ANION GAP SERPL CALCULATED.3IONS-SCNC: 14 MMOL/L (ref 7–16)
AST SERPL-CCNC: 15 U/L (ref 0–31)
BASOPHILS ABSOLUTE: 0.04 E9/L (ref 0–0.2)
BASOPHILS RELATIVE PERCENT: 0.4 % (ref 0–2)
BILIRUB SERPL-MCNC: 0.3 MG/DL (ref 0–1.2)
BUN BLDV-MCNC: 32 MG/DL (ref 6–23)
CALCIUM SERPL-MCNC: 8.2 MG/DL (ref 8.6–10.2)
CHLORIDE BLD-SCNC: 96 MMOL/L (ref 98–107)
CO2: 25 MMOL/L (ref 22–29)
CREAT SERPL-MCNC: 5.3 MG/DL (ref 0.5–1)
EOSINOPHILS ABSOLUTE: 0.34 E9/L (ref 0.05–0.5)
EOSINOPHILS RELATIVE PERCENT: 3 % (ref 0–6)
GFR AFRICAN AMERICAN: 10
GFR NON-AFRICAN AMERICAN: 8 ML/MIN/1.73
GLUCOSE BLD-MCNC: 129 MG/DL (ref 74–99)
HCT VFR BLD CALC: 24 % (ref 34–48)
HCT VFR BLD CALC: 24.3 % (ref 34–48)
HCT VFR BLD CALC: 24.7 % (ref 34–48)
HEMOGLOBIN: 7.5 G/DL (ref 11.5–15.5)
HEMOGLOBIN: 7.6 G/DL (ref 11.5–15.5)
HEMOGLOBIN: 7.8 G/DL (ref 11.5–15.5)
IMMATURE GRANULOCYTES #: 0.19 E9/L
IMMATURE GRANULOCYTES %: 1.7 % (ref 0–5)
IRON SATURATION: 35 % (ref 15–50)
IRON: 59 MCG/DL (ref 37–145)
LYMPHOCYTES ABSOLUTE: 1.39 E9/L (ref 1.5–4)
LYMPHOCYTES RELATIVE PERCENT: 12.2 % (ref 20–42)
MAGNESIUM: 2 MG/DL (ref 1.6–2.6)
MCH RBC QN AUTO: 32.3 PG (ref 26–35)
MCHC RBC AUTO-ENTMCNC: 31.7 % (ref 32–34.5)
MCV RBC AUTO: 102.1 FL (ref 80–99.9)
METER GLUCOSE: 113 MG/DL (ref 74–99)
METER GLUCOSE: 121 MG/DL (ref 74–99)
METER GLUCOSE: 128 MG/DL (ref 74–99)
METER GLUCOSE: 135 MG/DL (ref 74–99)
MONOCYTES ABSOLUTE: 0.85 E9/L (ref 0.1–0.95)
MONOCYTES RELATIVE PERCENT: 7.4 % (ref 2–12)
NEUTROPHILS ABSOLUTE: 8.6 E9/L (ref 1.8–7.3)
NEUTROPHILS RELATIVE PERCENT: 75.3 % (ref 43–80)
PDW BLD-RTO: 14 FL (ref 11.5–15)
PHOSPHORUS: 5.2 MG/DL (ref 2.5–4.5)
PLATELET # BLD: 226 E9/L (ref 130–450)
PMV BLD AUTO: 10.2 FL (ref 7–12)
POTASSIUM REFLEX MAGNESIUM: 5 MMOL/L (ref 3.5–5)
POTASSIUM SERPL-SCNC: 5.3 MMOL/L (ref 3.5–5)
RBC # BLD: 2.35 E12/L (ref 3.5–5.5)
SODIUM BLD-SCNC: 135 MMOL/L (ref 132–146)
TOTAL IRON BINDING CAPACITY: 171 MCG/DL (ref 250–450)
TOTAL PROTEIN: 6.9 G/DL (ref 6.4–8.3)
WBC # BLD: 11.4 E9/L (ref 4.5–11.5)

## 2021-12-01 PROCEDURE — 2709999900 HC NON-CHARGEABLE SUPPLY: Performed by: INTERNAL MEDICINE

## 2021-12-01 PROCEDURE — 1200000000 HC SEMI PRIVATE

## 2021-12-01 PROCEDURE — 3700000000 HC ANESTHESIA ATTENDED CARE: Performed by: INTERNAL MEDICINE

## 2021-12-01 PROCEDURE — 3609012400 HC EGD TRANSORAL BIOPSY SINGLE/MULTIPLE: Performed by: INTERNAL MEDICINE

## 2021-12-01 PROCEDURE — 7100000010 HC PHASE II RECOVERY - FIRST 15 MIN: Performed by: INTERNAL MEDICINE

## 2021-12-01 PROCEDURE — 82962 GLUCOSE BLOOD TEST: CPT

## 2021-12-01 PROCEDURE — 85018 HEMOGLOBIN: CPT

## 2021-12-01 PROCEDURE — 83540 ASSAY OF IRON: CPT

## 2021-12-01 PROCEDURE — 84100 ASSAY OF PHOSPHORUS: CPT

## 2021-12-01 PROCEDURE — 0DB98ZX EXCISION OF DUODENUM, VIA NATURAL OR ARTIFICIAL OPENING ENDOSCOPIC, DIAGNOSTIC: ICD-10-PCS | Performed by: INTERNAL MEDICINE

## 2021-12-01 PROCEDURE — 2580000003 HC RX 258: Performed by: NURSE PRACTITIONER

## 2021-12-01 PROCEDURE — 99233 SBSQ HOSP IP/OBS HIGH 50: CPT | Performed by: FAMILY MEDICINE

## 2021-12-01 PROCEDURE — C9113 INJ PANTOPRAZOLE SODIUM, VIA: HCPCS | Performed by: NURSE PRACTITIONER

## 2021-12-01 PROCEDURE — 87081 CULTURE SCREEN ONLY: CPT

## 2021-12-01 PROCEDURE — 2580000003 HC RX 258: Performed by: NURSE ANESTHETIST, CERTIFIED REGISTERED

## 2021-12-01 PROCEDURE — 6370000000 HC RX 637 (ALT 250 FOR IP): Performed by: FAMILY MEDICINE

## 2021-12-01 PROCEDURE — 5A1D70Z PERFORMANCE OF URINARY FILTRATION, INTERMITTENT, LESS THAN 6 HOURS PER DAY: ICD-10-PCS | Performed by: INTERNAL MEDICINE

## 2021-12-01 PROCEDURE — 83550 IRON BINDING TEST: CPT

## 2021-12-01 PROCEDURE — 36415 COLL VENOUS BLD VENIPUNCTURE: CPT

## 2021-12-01 PROCEDURE — 3700000001 HC ADD 15 MINUTES (ANESTHESIA): Performed by: INTERNAL MEDICINE

## 2021-12-01 PROCEDURE — 80053 COMPREHEN METABOLIC PANEL: CPT

## 2021-12-01 PROCEDURE — 88305 TISSUE EXAM BY PATHOLOGIST: CPT

## 2021-12-01 PROCEDURE — 6370000000 HC RX 637 (ALT 250 FOR IP): Performed by: NURSE PRACTITIONER

## 2021-12-01 PROCEDURE — 85014 HEMATOCRIT: CPT

## 2021-12-01 PROCEDURE — 84132 ASSAY OF SERUM POTASSIUM: CPT

## 2021-12-01 PROCEDURE — APPSS30 APP SPLIT SHARED TIME 16-30 MINUTES: Performed by: NURSE PRACTITIONER

## 2021-12-01 PROCEDURE — 6360000002 HC RX W HCPCS: Performed by: NURSE ANESTHETIST, CERTIFIED REGISTERED

## 2021-12-01 PROCEDURE — 97165 OT EVAL LOW COMPLEX 30 MIN: CPT

## 2021-12-01 PROCEDURE — 83735 ASSAY OF MAGNESIUM: CPT

## 2021-12-01 PROCEDURE — 85025 COMPLETE CBC W/AUTO DIFF WBC: CPT

## 2021-12-01 PROCEDURE — 7100000011 HC PHASE II RECOVERY - ADDTL 15 MIN: Performed by: INTERNAL MEDICINE

## 2021-12-01 PROCEDURE — 0DB68ZX EXCISION OF STOMACH, VIA NATURAL OR ARTIFICIAL OPENING ENDOSCOPIC, DIAGNOSTIC: ICD-10-PCS | Performed by: INTERNAL MEDICINE

## 2021-12-01 PROCEDURE — 97161 PT EVAL LOW COMPLEX 20 MIN: CPT

## 2021-12-01 PROCEDURE — 6360000002 HC RX W HCPCS: Performed by: NURSE PRACTITIONER

## 2021-12-01 RX ORDER — LIDOCAINE HYDROCHLORIDE 20 MG/ML
INJECTION, SOLUTION INTRAVENOUS PRN
Status: DISCONTINUED | OUTPATIENT
Start: 2021-12-01 | End: 2021-12-01 | Stop reason: SDUPTHER

## 2021-12-01 RX ORDER — PROPOFOL 10 MG/ML
INJECTION, EMULSION INTRAVENOUS PRN
Status: DISCONTINUED | OUTPATIENT
Start: 2021-12-01 | End: 2021-12-01 | Stop reason: SDUPTHER

## 2021-12-01 RX ORDER — SODIUM CHLORIDE 9 MG/ML
INJECTION, SOLUTION INTRAVENOUS CONTINUOUS PRN
Status: DISCONTINUED | OUTPATIENT
Start: 2021-12-01 | End: 2021-12-01 | Stop reason: SDUPTHER

## 2021-12-01 RX ORDER — TRAMADOL HYDROCHLORIDE 50 MG/1
50 TABLET ORAL EVERY 6 HOURS PRN
Status: DISCONTINUED | OUTPATIENT
Start: 2021-12-01 | End: 2021-12-03 | Stop reason: HOSPADM

## 2021-12-01 RX ADMIN — TRAMADOL HYDROCHLORIDE 50 MG: 50 TABLET, COATED ORAL at 09:50

## 2021-12-01 RX ADMIN — Medication 10 ML: at 07:56

## 2021-12-01 RX ADMIN — CARVEDILOL 12.5 MG: 6.25 TABLET, FILM COATED ORAL at 16:29

## 2021-12-01 RX ADMIN — LIDOCAINE HYDROCHLORIDE 50 MG: 20 INJECTION, SOLUTION INTRAVENOUS at 14:13

## 2021-12-01 RX ADMIN — CARVEDILOL 12.5 MG: 6.25 TABLET, FILM COATED ORAL at 07:55

## 2021-12-01 RX ADMIN — SODIUM CHLORIDE: 9 INJECTION, SOLUTION INTRAVENOUS at 14:05

## 2021-12-01 RX ADMIN — ATORVASTATIN CALCIUM 40 MG: 40 TABLET, FILM COATED ORAL at 07:55

## 2021-12-01 RX ADMIN — PROPOFOL 150 MG: 10 INJECTION, EMULSION INTRAVENOUS at 14:13

## 2021-12-01 ASSESSMENT — PAIN SCALES - GENERAL
PAINLEVEL_OUTOF10: 0
PAINLEVEL_OUTOF10: 0
PAINLEVEL_OUTOF10: 8

## 2021-12-01 ASSESSMENT — PAIN DESCRIPTION - DESCRIPTORS: DESCRIPTORS: ACHING;DISCOMFORT

## 2021-12-01 ASSESSMENT — PAIN DESCRIPTION - LOCATION: LOCATION: NECK;SHOULDER

## 2021-12-01 ASSESSMENT — LIFESTYLE VARIABLES: SMOKING_STATUS: 0

## 2021-12-01 ASSESSMENT — PAIN - FUNCTIONAL ASSESSMENT: PAIN_FUNCTIONAL_ASSESSMENT: ACTIVITIES ARE NOT PREVENTED

## 2021-12-01 ASSESSMENT — PAIN DESCRIPTION - ORIENTATION: ORIENTATION: RIGHT

## 2021-12-01 ASSESSMENT — PAIN DESCRIPTION - PROGRESSION
CLINICAL_PROGRESSION: NOT CHANGED
CLINICAL_PROGRESSION: NOT CHANGED

## 2021-12-01 ASSESSMENT — PAIN DESCRIPTION - PAIN TYPE: TYPE: ACUTE PAIN

## 2021-12-01 ASSESSMENT — PAIN DESCRIPTION - DIRECTION: RADIATING_TOWARDS: NON-RADIATING

## 2021-12-01 ASSESSMENT — PAIN DESCRIPTION - FREQUENCY: FREQUENCY: CONTINUOUS

## 2021-12-01 ASSESSMENT — PAIN DESCRIPTION - ONSET: ONSET: ON-GOING

## 2021-12-01 NOTE — PROGRESS NOTES
Physical Therapy    Facility/Department: [facility] SURG  Initial Assessment    NAME: Meg Martinez  : 1955  MRN: [id number]    Date of Service: 2021      Patient Diagnosis(es): The primary encounter diagnosis was Septicemia [facility]). Diagnoses of Neck pain and Gastrointestinal hemorrhage, unspecified gastrointestinal hemorrhage type were also pertinent to this visit. has a past medical history of Brain aneurysm, CLABSI (central line-associated bloodstream infection), Diabetes mellitus ([facility] [address] ESRD on hemodialysis ([facility] [address] H/O heart artery stent, Hyperlipidemia, and Hypertension. has a past surgical history that includes Cardiac surgery;  section; and vascular surgery (N/A, 2021). Evaluating Therapist: Jez Nguyen PT      Room #:  3458/0251-K  Diagnosis:  Neck pain [M54.2]  Septicemia ([facility] [address] [A41.9]  Sepsis ([facility] [address] [A41.9]  Gastrointestinal hemorrhage, unspecified gastrointestinal hemorrhage type [K92.2]  PMHx/PSHx: DM, brain aneurysm  Precautions:  falls      Social:  Pt lives with daughter in a 1 floor plan 1 small step to enter. Independent with ww. Initial Evaluation  Date:  Treatment      Short Term/ Long Term   Goals   Was pt agreeable to Eval/treatment? yes     Does pt have pain? Neck pain and R shoulder pain     Bed Mobility  Rolling: NT  Supine to sit: NT  Sit to supine: NT  Scooting: NT    independent   Transfers Sit to stand: independent  Stand to sit: independent  Stand pivot: independent  independent   Ambulation    40 feet with ww with SBA (distance limited secondary to low Hgb?  100 feet with ww independent   Stair Negotiation  Ascended and descended  NT      LE strength     4-/5    4/5   balance      Good with ww     AM-PAC Raw score               20/24         Pt is alert and Oriented   LE ROM: WFL  Sensation: intact  Edema: none  Endurance: fair+     ASSESSMENT:    Pt displays functional ability as noted in the objective portion of this evaluation. Patient education  Pt educated on PT objectives    Patient response to education:   Pt verbalized understanding Pt demonstrated skill Pt requires further education in this area   yes           Comments:  Pt c/o neck and R shoulder pain. Declined to return to bed stating being in bed makes the pain worse. Conditions Requiring Skilled Therapeutic Intervention:    [x]Decreased strength     []Decreased ROM  [x]Decreased functional mobility  []Decreased balance   [x]Decreased endurance   []Decreased posture  []Decreased sensation  []Decreased coordination   []Decreased vision  []Decreased safety awareness   []Increased pain       Patient and or family understand(s) diagnosis, prognosis, and plan of care. Prognosis is good for reaching above PT goals    PHYSICAL THERAPY PLAN OF CARE:    PT POC is established based on physician order and patient diagnosis     Referring provider/PT Order: TAWNYA Marquez CNP/ PT eval and treat      Current Treatment Recommendations:     [x] Strengthening to improve independence with functional mobility   [] ROM to improve independence with functional mobility   [x] Balance Training to improve static/dynamic balance and to reduce fall risk  [x] Endurance Training to improve activity tolerance during functional mobility   [x] Transfer Training to improve safety and independence with all functional transfers   [x] Gait Training to improve gait mechanics, endurance and assess need for appropriate assistive device  [] Stair Training in preparation for safe discharge home and/or into the community   [] Positioning to prevent skin breakdown and contractures  [x] Safety and Education Training   [x] Patient/Caregiver Education   [] HEP  [] Other     PT long term treatment goals are located in above grid    Frequency of treatments: 2-5x/week x 3-5 days.     Time in  0919  Time out  0932      Evaluation Time includes thorough review of current medical information, gathering information on past medical history/social history and prior level of function, completion of standardized testing/informal observation of tasks, assessment of data and education on plan of care and goals.       CPT codes:  [x] Low Complexity PT evaluation 51079  [] Moderate Complexity PT evaluation 23440  [] High Complexity PT evaluation 22808  [] PT Re-evaluation 63292  [] Gait training 40773 minutes  [] Manual therapy 67263 minutes  [] Therapeutic activities 94521 minutes  [] Therapeutic exercises 11639 minutes  [] Neuromuscular reeducation 06846 minutes     [facility] PSYCHIATRY PT [id number]

## 2021-12-01 NOTE — PROGRESS NOTES
[address] [place] [address] [facility]  Progress Note    SUBJECTIVE:  Chief Complaint   Patient presents with    Rectal Bleeding     pt arrives with ems from dialysis. report of black tarry stools this am. pt had low hgb reported at dialysis 7ish.  Nausea     nausea reported since this am with dry heaves no emesis.  Neck Pain     neck pain since pt discharge from [facility] main friday. 9/10 sharp pain. Patient is tolerating medications. No nausea, vomiting, diarrhea. Sitting up in the chair, daughter present  Neck pain is slightly better today but still painful  She is going for an EGD today     Review of systems:  As stated above in the chief complaint, otherwise negative. Medications:  Scheduled Meds:   atorvastatin  40 mg Oral Daily    carvedilol  12.5 mg Oral BID WC    insulin lispro  3 Units SubCUTAneous TID WC    sodium chloride flush  5-40 mL IntraVENous 2 times per day    pantoprazole  40 mg IntraVENous Q12H    And    sodium chloride (PF)  10 mL IntraVENous Q12H    ceFAZolin 2000 mg in 20 mL SWFI IV Syringe  2,000 mg IntraVENous See Admin Instructions    ceFAZolin  3,000 mg IntraVENous See Admin Instructions     Continuous Infusions:   sodium chloride       PRN Meds:traMADol, sodium chloride flush, sodium chloride, ondansetron **OR** ondansetron, acetaminophen **OR** acetaminophen    OBJECTIVE:  /60   Pulse 71   Temp 98.9 °F (37.2 °C) (Oral)   Resp 18   Ht 5' 4\" (1.626 m)   Wt 252 lb 8 oz (114.5 kg)   SpO2 94%   BMI 43.34 kg/m²   Temp  Av.4 °F (36.9 °C)  Min: 98.2 °F (36.8 °C)  Max: 98.9 °F (37.2 °C)  Constitutional: The patient is awake, alert, and oriented. Sitting up in the chair in no distress  Skin: Warm and dry. No rashes were noted. HEENT: Round and reactive pupils. Moist mucous membranes. No ulcerations or thrush. Neck: Supple to movements. ++pain   Chest: No respiratory distress. Symmetrical expansion.   No wheezing, crackles or TAWNYA Blue - CNP  1:13 PM  12/1/2021     Patient seen and examined. I had a face to face encounter with the patient. Agree with exam.  Assessment and plan as outlined above and directed by me. Addition and corrections were done as deemed appropriate. My exam and plan include: The patient will be having an MRI tomorrow and then have dialysis. Continue Cefazolin.     Yanni Bryant MD  12/1/2021  1:48 PM

## 2021-12-01 NOTE — ANESTHESIA PRE PROCEDURE
Department of Anesthesiology  Preprocedure Note       Name:  Rafaela Lopes   Age:  73 y.o.  :  1955                                          MRN:  [id number]         Date:  2021      Surgeon: Stew Gong):  Dennie Salen, MD    Procedure: Procedure(s):  EGD ESOPHAGOGASTRODUODENOSCOPY    Medications prior to admission:   Prior to Admission medications    Medication Sig Start Date End Date Taking? Authorizing Provider   ceFAZolin (ANCEF) infusion Infuse 2,000 mg intravenously Twice a Week for 28 days Compound per protocol. Tuesday and Thursday after HD. Stop date 2021  TAWNYA Irene CNP   ceFAZolin (ANCEF) infusion Infuse 3,000 mg intravenously once a week Compound per protocol. Every Saturday after HD. Stop date 21. 21  TAWNYA Irene CNP   atorvastatin (LIPITOR) 40 MG tablet Take 40 mg by mouth daily    Historical Provider, MD   carvedilol (COREG) 12.5 MG tablet Take 12.5 mg by mouth 2 times daily (with meals)    Historical Provider, MD   clopidogrel (PLAVIX) 75 MG tablet Take 75 mg by mouth daily    Historical Provider, MD   aspirin 81 MG EC tablet Take 81 mg by mouth daily    Historical Provider, MD   Insulin Glargine, 2 Unit Dial, (TOUJEO MAX SOLOSTAR) 300 UNIT/ML SOPN Inject 25 Units into the skin daily    Historical Provider, MD   insulin aspart (NOVOLOG) 100 UNIT/ML injection vial Inject 3 Units into the skin 3 times daily (before meals)    Historical Provider, MD       Current medications:    No current facility-administered medications for this visit. No current outpatient medications on file.      Facility-Administered Medications Ordered in Other Visits   Medication Dose Route Frequency Provider Last Rate Last Admin    traMADol (ULTRAM) tablet 50 mg  50 mg Oral Q6H PRN Sandy López MD   50 mg at 21 0950    atorvastatin (LIPITOR) tablet 40 mg  40 mg Oral Daily TAWNYA Ghotra CNP   40 mg at 21 6895    carvedilol (COREG) tablet 12.5 mg  12.5 mg Oral BID WC Shelli Pry, APRN - CNP   12.5 mg at 12/01/21 0755    insulin lispro (HUMALOG) injection vial 3 Units  3 Units SubCUTAneous TID WC Shelli Pry, APRN - CNP   3 Units at 11/30/21 1700    sodium chloride flush 0.9 % injection 5-40 mL  5-40 mL IntraVENous 2 times per day Shelli Pry, APRN - CNP   10 mL at 12/01/21 0756    sodium chloride flush 0.9 % injection 5-40 mL  5-40 mL IntraVENous PRN Shelli Pry, APRN - CNP        0.9 % sodium chloride infusion  25 mL IntraVENous PRN Shelli Pry, APRN - CNP        ondansetron (ZOFRAN-ODT) disintegrating tablet 4 mg  4 mg Oral Q8H PRN Shelli Pry, APRN - CNP        Or    ondansetron [facility]) injection 4 mg  4 mg IntraVENous Q6H PRN Shelli Pry, APRN - CNP        acetaminophen (TYLENOL) tablet 650 mg  650 mg Oral Q6H PRN Shelli Pry, APRN - CNP        Or    acetaminophen (TYLENOL) suppository 650 mg  650 mg Rectal Q6H PRN Shelli Pry, APRN - CNP        pantoprazole (PROTONIX) injection 40 mg  40 mg IntraVENous Q12H Shelli Pry, APRN - CNP   40 mg at 11/30/21 1653    And    sodium chloride (PF) 0.9 % injection 10 mL  10 mL IntraVENous Q12H Shelli Pry, APRN - CNP   10 mL at 11/30/21 1700    ceFAZolin (ANCEF) 2000 mg in sterile water 20 mL IV syringe  2,000 mg IntraVENous See Admin Instructions Shelli Pry, APRN - CNP        ceFAZolin (ANCEF) 3,000 mg in dextrose 5 % 100 mL IVPB  3,000 mg IntraVENous See Admin Instructions Shelli Pry, APRN - CNP           Allergies:     Allergies   Allergen Reactions    Morphine Itching    Other      bleach    Pcn [Penicillins] Rash       Problem List:    Patient Active Problem List   Diagnosis Code    Nausea & vomiting R11.2    MSSA bacteremia R78.81, B95.61    CLABSI (central line-associated bloodstream infection) T80.211A    ESRD on hemodialysis ([facility] N18.6, Z99.2    Fever, unspecified R50.9    Sepsis ([facility] A41.9    Upper GI bleed K92.2    Essential hypertension I10    Hyperlipidemia E78.5    Diabetes mellitus type 2 with complications ([facility] E60.1    Neck pain M54.2    Anemia in CKD (chronic kidney disease) N18.9, D63.1       Past Medical History:        Diagnosis Date    Brain aneurysm     CLABSI (central line-associated bloodstream infection) 2021    Diabetes mellitus ([facility] [address]     ESRD on hemodialysis ([facility] [address] 2021    H/O heart artery stent     Hyperlipidemia     Hypertension        Past Surgical History:        Procedure Laterality Date    CARDIAC SURGERY       SECTION      VASCULAR SURGERY N/A 2021    CATHETER INSERTION  TUNNELED HEMODIALYSIS, WITH REMOVAL OF TEMPORARY CATHETER performed by Bartolo Mike MD at  [facility] History:    Social History     Tobacco Use    Smoking status: Former Smoker     Packs/day: 1.00     Quit date: 2017     Years since quittin.0    Smokeless tobacco: Never Used   Substance Use Topics    Alcohol use: Never                                Counseling given: Not Answered      Vital Signs (Current): There were no vitals filed for this visit.                                            BP Readings from Last 3 Encounters:   21 131/60   21 102/65   21 111/83       NPO Status:                                                                                 BMI:   Wt Readings from Last 3 Encounters:   21 252 lb 8 oz (114.5 kg)   21 247 lb 12.8 oz (112.4 kg)   21 240 lb 1.3 oz (108.9 kg)     There is no height or weight on file to calculate BMI.    CBC:   Lab Results   Component Value Date    WBC 11.4 2021    RBC 2.35 2021    HGB 7.8 2021    HCT 24.7 2021    .1 2021    RDW 14.0 2021     2021       CMP:   Lab Results   Component Value Date     2021    K 5.0 2021    CL 96 2021    CO2 25 2021    BUN 32 2021    CREATININE 5.3 12/01/2021    GFRAA 10 12/01/2021    LABGLOM 8 12/01/2021    GLUCOSE 129 12/01/2021    PROT 6.9 12/01/2021    CALCIUM 8.2 12/01/2021    BILITOT 0.3 12/01/2021    ALKPHOS 109 12/01/2021    AST 15 12/01/2021    ALT <5 12/01/2021       POC Tests: No results for input(s): POCGLU, POCNA, POCK, POCCL, POCBUN, POCHEMO, POCHCT in the last 72 hours. Coags:   Lab Results   Component Value Date    PROTIME 11.7 11/30/2021    INR 1.1 11/30/2021    APTT 45.1 11/30/2021       HCG (If Applicable): No results found for: PREGTESTUR, PREGSERUM, HCG, HCGQUANT     ABGs: No results found for: PHART, PO2ART, YPO4REQ, KEG2NUZ, BEART, E7DVNUFH     Type & Screen (If Applicable):  No results found for: LABABO, LABRH    Drug/Infectious Status (If Applicable):  No results found for: HIV, HEPCAB    COVID-19 Screening (If Applicable):   Lab Results   Component Value Date    COVID19 Not Detected 11/17/2021       Narrative   EXAMINATION:   ONE XRAY VIEW OF THE CHEST PORTABLE SUPINE       11/30/2021 10:37 am       COMPARISON:   11/24/2021       HISTORY:   ORDERING SYSTEM PROVIDED HISTORY: epigastric pain   TECHNOLOGIST PROVIDED HISTORY:   Reason for exam:->epigastric pain       FINDINGS:   The right subclavian dialysis catheter appears unchanged in position.  Again   seen is short length mild kinking of the catheter at the level of the right   1st rib and the degree of catheter narrowing appears less conspicuous than   prior.  Otherwise, no significant change.  Cardiomegaly and mild pulmonary   venous congestion.  No acute pulmonary infiltrate or pleural effusion.  No   pneumothorax.          Anesthesia Evaluation  Patient summary reviewed and Nursing notes reviewed no history of anesthetic complications:   Airway: Mallampati: II  TM distance: >3 FB   Neck ROM: full  Mouth opening: > = 3 FB Dental: normal exam         Pulmonary:Negative Pulmonary ROS and normal exam        (-) not a current smoker (Former) [facility] TAWNYA Healy - TANESHA   12/1/2021        History, data, and pertinent studies from chart review. Above represents information available via the shared medical record including previous anesthetic, medication, and allergy history.   Confirmation of above and final disposition per DOS anesthesiologist.    TAWNYA Lion - CRNA  Staff Anesthesiologist  December 1, 2021  2:03 PM

## 2021-12-01 NOTE — PROGRESS NOTES
Pt is a hard stick, RN attempted multiple times to draw pt's 8pm H&H waw unsuccessful. Informed the clinical manager and awaiting for her to come to draw the blood.      Electronically signed by Mesha Ortiz RN on 11/30/2021 at 11:31 PM

## 2021-12-01 NOTE — PROGRESS NOTES
Occupational Therapy  OCCUPATIONAL THERAPY INITIAL EVALUATION      Date:2021  Patient Name: Cecilio Sims  MRN: [id number]  : 1955  Room: ENDO POOL ROOM/NONE    OCCUPATIONAL THERAPY INITIAL EVALUATION    [facility], [place] [facility]:                                                  Patient Name: Cecilio Smis    MRN: [id number]    : 1955    Room: ENDO POOL ROOM/NONE      Evaluating OT: Krishan Delatorre OTR/L   [id number]      Referring TAWNYA Herrera CNP    Specific Provider Orders/Date:OT eval and treat 2021      Diagnosis:  Neck pain [M54.2]  Septicemia ([address] [A41.9]  Sepsis ([facility] [A41.9]  Gastrointestinal hemorrhage, unspecified gastrointestinal hemorrhage type [K92.2]     Pertinent Medical History: DM,  ESRD, dialysis, brain aneurysm     Precautions:  Fall Risk, alarm      Assessment of current deficits    [x] Functional mobility  [x]ADLs  [x] Strength               []Cognition    [x] Functional transfers   [x] IADLs         [x] Safety Awareness   [x]Endurance    [] Fine Coordination              [x] Balance      [] Vision/perception   []Sensation     []Gross Motor Coordination  [] ROM  [] Delirium                   [] Motor Control     OT PLAN OF CARE   OT POC based on physician orders, patient diagnosis and results of clinical assessment    Frequency/Duration  2-3 days/wk for 2 weeks PRN   Specific OT Treatment Interventions to include:   ADL retraining/adapted techniques and AE recommendations to increase functional independence within precautions                    Energy conservation techniques to improve tolerance for selfcare routine   Functional transfer/mobility training/DME recommendations for increased independence, safety and fall prevention         Patient/family education to increase safety and functional independence             Environmental modifications for safe mobility and completion of ADLs                             Therapeutic activity to improve functional performance during ADLs. Therapeutic exercise to improve tolerance and functional strength for ADLs    Balance retraining/tolerance tasks for facilitation of postural control with dynamic challenges during ADLs . Positioning to improve functional independence    Recommended Adaptive Equipment: TBD     Home Living: Pt lives with daughter, ranch with 1 step in.      Bathroom setup: tub/shower with grab bar    Equipment owned: walker     Prior Level of Function: Independent  with ADLs , assist as needed  with IADLs; ambulated with walker    Pain Level: R side neck and shoulder pain ;   Cognition: A&O: 4/4;    Memory:  Good    Sequencing:  Good    Problem solving:  Fair+   Judgement/safety:  Fair +     Functional Assessment:  AM-PAC Daily Activity Raw Score: 19/24   Initial Eval Status  Date: 12/1/21 Treatment Status  Date: STGs = LTGs  Time frame: 10-14 days   Feeding Independent      Grooming Set-up    Independent    UB Dressing Min A  R UE pain   Mod I    LB Dressing Min A  Mod I    Bathing Min A  Mod I    Toileting Independent      Bed Mobility  Up in recliner upon entry   [address] from chair   Mod I    Functional Mobility SBA,w/walker   Ambulating in room   No LOB or SOB   Mod I  with good tolerance    Balance Sitting:     Static:  Independent    Dynamic:SBA  Standing: SBA   Independent    Activity Tolerance Fair with light activity  Good  with ADL activity    Visual/  Perceptual Glasses: none by bedside         R UE ROM  Decrease ROM R UE   Tolerate UE ROM/therapeutic exercises with good tolerance to increase function for ADLS and decrease pain      Hand Dominance righ t   AROM (PROM) Strength Additional Info:    RUE  Patient actively moving R UE within tolerance  C/o increase pain shoulder flexion past 90 degrees  [facility] good  and wfl FMC/dexterity noted during ADL tasks       [facility] WFL good  and wfl FMC/dexterity noted during ADL tasks       Hearing: WFL   Sensation:  No c/o numbness or tingling   Tone: WFL   Edema: none observed     Comments: Upon arrival patient sitting in recliner . At end of session, patient returned to recline r with call light and phone within reach, all lines and tubes intact. *ALARM ON      Overall patient demonstrated  decreased independence and safety during completion of ADL/functional transfer/mobility tasks. Pt would benefit from continued skilled OT to increase safety and independence with completion of ADL/IADL tasks for functional independence and quality of life. Rehab Potential: good for established goals     Patient / Family Goal: return home      Patient and/or family were instructed on functional diagnosis, prognosis/goals and OT plan of care. Demonstrated  Good  understanding. Eval Complexity: Low    Time In: 0917  Time Out: 0929      Min Units   OT Eval Low 97165 x  1   OT Eval Medium 12771      OT Eval High 43182      OT Re-Eval [id number]       Therapeutic Ex [id number]       Therapeutic Activities 65961       ADL/Self Care 54575       Orthotic Management 86808       Manual 00440     Neuro Re-Ed 00404       Non-Billable Time          Evaluation Time additionally includes thorough review of current medical information, gathering information on past medical history/social history and prior level of function, interpretation of standardized testing/informal observation of tasks, assessment of data and development of plan of care and goals.             John Thayer  OTR/L  OT [id number]

## 2021-12-01 NOTE — PROGRESS NOTES
[facility] Progress Note    Admitting Date and Time: 11/30/2021 10:14 AM  Admit Dx: Neck pain [M54.2]  Septicemia ([facility] [address] [A41.9]  S[facility] [A41.9]  Gastrointestinal hemorrhage, unspecified gastrointestinal hemorrhage type [K92.2]    Chief Compliant: Neck Pain, Dark Stools    Subjective: Patient seen sitting up in the chair awake and alert in no distress. Reports that overall she is better however still with pain in her neck. She reports that the [address] yesterday made her sleep so doesn't really want to take that again. She denies any shortness of breath or difficulty breathing. Denies any fever or chills. Reports that she did have a bowel movement this morning and it was normal.  Per nursing patient is for EGD this afternoon with GI. MRI will be done tomorrow morning and patient will receive HD after given the contrast - will also need HD again on Friday and Saturday. No other issues or concerns from nursing. Full 10 point ROS negative unless mentioned above.      atorvastatin  40 mg Oral Daily    carvedilol  12.5 mg Oral BID WC    insulin lispro  3 Units SubCUTAneous TID WC    sodium chloride flush  5-40 mL IntraVENous 2 times per day    pantoprazole  40 mg IntraVENous Q12H    And    sodium chloride (PF)  10 mL IntraVENous Q12H    ceFAZolin 2000 mg in 20 mL SWFI IV Syringe  2,000 mg IntraVENous See Admin Instructions    ceFAZolin  3,000 mg IntraVENous See Admin Instructions     traMADol, 50 mg, Q6H PRN  sodium chloride flush, 5-40 mL, PRN  sodium chloride, 25 mL, PRN  ondansetron, 4 mg, Q8H PRN   Or  ondansetron, 4 mg, Q6H PRN  acetaminophen, 650 mg, Q6H PRN   Or  acetaminophen, 650 mg, Q6H PRN         Objective:    /60   Pulse 71   Temp 98.9 °F (37.2 °C) (Oral)   Resp 18   Ht 5' 4\" (1.626 m)   Wt 252 lb 8 oz (114.5 kg)   SpO2 94%   BMI 43.34 kg/m²   General Appearance: awake and alert, oriented x 3, in no distress  Skin: warm and dry, no rashes or lesions noted  Head: normocephalic and atraumatic  Eyes: pupils equal, round, and reactive to light, conjunctivae normal  Neck: supple and non tender without mass, no lymphadenopathy noted  Pulmonary/Chest: clear throughout, respirations even and non-labored on room air  Cardiovascular: regular rate and rhythm, S1S2 present, no murmur noted, right chest wall TDC  Abdomen: obese, soft, non-tender, non-distended, normal bowel sounds  Musculoskeletal: 4/5 strength to all extremities, pain with ROM to the neck, no other joint swelling/tenderness noted  Extremities: 1+ edema to BLE with tubi  in place, no cyanosis or clubbing noted  Neurologic: no cranial nerve deficit and speech         Recent Labs     11/30/21  1150 12/01/21  0228    135   K 4.0 5.0   CL 97* 96*   CO2 24 25   BUN 23 32*   CREATININE 4.1* 5.3*   GLUCOSE 131* 129*   CALCIUM 8.2* 8.2*       Recent Labs     11/30/21  1150 12/01/21  0228 12/01/21  0858   WBC 19.4* 11.4  --    RBC 2.61* 2.35*  --    HGB 8.4* 7.6* 7.8*   HCT 26.5* 24.0* 24.7*   .5* 102.1*  --    MCH 32.2 32.3  --    MCHC 31.7* 31.7*  --    RDW 13.9 14.0  --     226  --    MPV 9.9 10.2  --        Assessment:    Active Problems:    MSSA bacteremia    CLABSI (central line-associated bloodstream infection)    ESRD on hemodialysis (HCC)    Upper GI bleed    Essential hypertension    Hyperlipidemia    Neck pain    Anemia in CKD (chronic kidney disease)  Resolved Problems:    * No resolved hospital problems. *      Plan:  1. CLABSI with MSSA Bacteremia: s/p TDC removal, PENELOPE done during prior admission and negative for vegetation, continue IV Ancef TTS x 4 weeks treatment, ID following  2. Possible Upper GI Bleed: patient with dark tarry stools, hemoglobin has dropped slightly since admission but remains stable, will continue PPI BID for now, monitor H&H, plan for EGD this afternoon with GI  3.   Neck Pain: was present during prior admission, CT cervical spine with degenerative changes, will check MRI of the cervical spine w/wo contrast to evaluate for possible discitis/abscess; will be done tomorrow  4. Leukocytosis: worsened since recent discharge, possibly related to GI bleed vs infectious process, WBC has normalized this morning, continue IV Ancef - ID following for antibiotic management  5. ESRD: continue HD TTS, since MRI to be done with contrast tomorrow will need treatment Thursday/Friday/Saturday - Renal following  6. Anemia in CKD: could also be a component of blood loss given dark tarry stools, will monitor H&H, transfuse to keep hemoglobin >7.0  7. Essential Hypertension: continue medications and monitor BP closely, adjust as indicated  8. Hyperlipidemia: continue statin  9. CAD: holding ASA/Plavix for now given concern for GI bleed, can consider resuming after EGD if H&H remains stable  10. Physical Debility: patient reports difficulty ambulating and fatigue, will ask PT/OT to evaluate       NOTE: This report was transcribed using voice recognition software. Every effort was made to ensure accuracy; however, inadvertent computerized transcription errors may be present. Electronically signed by Terry Escudero CNP on 12/1/2021 at 10:49 AM

## 2021-12-01 NOTE — PROGRESS NOTES
Call received from Dr. Radha Chong regarding HD schedule. Pt to have MRI tomorrow at 11 am with HD immediately after. Repeat K+ draw per floor to determine if HD is needed today. Charge JUDY Spain made aware.

## 2021-12-01 NOTE — PROGRESS NOTES
Called extension [phone] for EGD schedule. Left voice message.       Electronically signed by Marc Stoner RN on 12/1/2021 at 1:03 AM

## 2021-12-01 NOTE — ANESTHESIA POSTPROCEDURE EVALUATION
Department of Anesthesiology  Postprocedure Note    Patient: Purvi Shelton  MRN: [id number]  YOB: 1955  Date of evaluation: 12/1/2021  Time:  2:21 PM     Procedure Summary     Date: 12/01/21 Room / Location: [address]    Anesthesia Start: 0623 Anesthesia Stop: 9322    Procedure: EGD BIOPSY (N/A ) Diagnosis: (-)    Surgeons: Mohit Buenrostro MD Responsible Provider: Prateek Brito MD    Anesthesia Type: MAC ASA Status: 4          Anesthesia Type: MAC    Gloria Phase I:      Gloria Phase II:      Last vitals: Reviewed and per EMR flowsheets.        Anesthesia Post Evaluation    Patient location during evaluation: PACU  Patient participation: complete - patient participated  Level of consciousness: awake and alert  Airway patency: patent  Nausea & Vomiting: no nausea and no vomiting  Complications: no  Cardiovascular status: blood pressure returned to baseline  Respiratory status: acceptable and room air  Hydration status: euvolemic

## 2021-12-01 NOTE — PATIENT CARE CONFERENCE
[facility] Quality Flow/Interdisciplinary Rounds Progress Note        Quality Flow Rounds held on December 1, 2021    Disciplines Attending:  Bedside Nurse, ,  and Nursing Unit Leadership    Benji Arriaza was admitted on 11/30/2021 10:14 AM    Anticipated Discharge Date:  Expected Discharge Date: 12/04/21    Disposition:    Dm Score:  Dm Scale Score: 19    Readmission Risk              Risk of Unplanned Readmission:  21           Discussed patient goal for the day, patient clinical progression, and barriers to discharge.   The following Goal(s) of the Day/Commitment(s) have been identified:  Diagnostics - Report Results      Mo Blanco RN  December 1, 2021

## 2021-12-01 NOTE — PROGRESS NOTES
Notified the clinical manager multiple times about pt's timed H&H. Clinical manager unable to come upstair and get the blood. Informed the phlebotomy about the timed lab and stated they will come up as soon as possible.         Electronically signed by Cholo Luo RN on 12/1/2021 at 2:13 AM

## 2021-12-01 NOTE — CARE COORDINATION
Introduced my self and provided explanation of CM role to patient. Patient is awake, alert, and aware of current diagnosis and treatment plan including EGD, ID consult, IV atb with HD. Patient voices she resides at home with her daughter. She indicates that daughter did offer some physical assistance with adl's. Daughter also provided transportation to patient's chronic HD sessions at [facility]  Patient adds she is new to [facility]. She has not yet established with a primary care physician. She states it is here intention to schedule with Dr. Shruthi Khalil as family members see him. She currently denies any NEW post discharge needs. If ID writes new IV atb script - will need to forward to JENNIFER. Will follow along with  and assist with discharge planning as necessary. Deisy Gómez.  Suzi, MSN, RN  [facility] Case Management  [phone]

## 2021-12-01 NOTE — PROGRESS NOTES
Clinical manager notified of EGD schedule for today.       Electronically signed by Cass Tamez RN on 12/1/2021 at 1:11 AM

## 2021-12-01 NOTE — ANESTHESIA PRE PROCEDURE
Department of Anesthesiology  Preprocedure Note       Name:  Deandre Franklin   Age:  73 y.o.  :  1955                                          MRN:  [id number]         Date:  2021      Surgeon: Lord Arreguin):  Torey Gale MD    Procedure: Procedure(s):  EGD ESOPHAGOGASTRODUODENOSCOPY    Medications prior to admission:   Prior to Admission medications    Medication Sig Start Date End Date Taking? Authorizing Provider   ceFAZolin (ANCEF) infusion Infuse 2,000 mg intravenously Twice a Week for 28 days Compound per protocol. Tuesday and Thursday after HD. Stop date 2021  TAWNYA Foster CNP   ceFAZolin (ANCEF) infusion Infuse 3,000 mg intravenously once a week Compound per protocol. Every Saturday after HD.  Stop date 21. 21  TAWNYA Foster CNP   atorvastatin (LIPITOR) 40 MG tablet Take 40 mg by mouth daily    Historical Provider, MD   carvedilol (COREG) 12.5 MG tablet Take 12.5 mg by mouth 2 times daily (with meals)    Historical Provider, MD   clopidogrel (PLAVIX) 75 MG tablet Take 75 mg by mouth daily    Historical Provider, MD   aspirin 81 MG EC tablet Take 81 mg by mouth daily    Historical Provider, MD   Insulin Glargine, 2 Unit Dial, (TOUJEO MAX SOLOSTAR) 300 UNIT/ML SOPN Inject 25 Units into the skin daily    Historical Provider, MD   insulin aspart (NOVOLOG) 100 UNIT/ML injection vial Inject 3 Units into the skin 3 times daily (before meals)    Historical Provider, MD       Current medications:    Current Facility-Administered Medications   Medication Dose Route Frequency Provider Last Rate Last Admin    atorvastatin (LIPITOR) tablet 40 mg  40 mg Oral Daily Naveen Cornelius APRN - CNP   40 mg at 21 1654    carvedilol (COREG) tablet 12.5 mg  12.5 mg Oral BID  TAWNYA Napoles - CNP        insulin lispro (HUMALOG) injection vial 3 Units  3 Units SubCUTAneous TID  Naveen Cornelius APRN - CNP   3 Units at 21 1700    sodium chloride flush 0.9 % injection 5-40 mL  5-40 mL IntraVENous 2 times per day Milus Lapidus, APRN - CNP   10 mL at 11/30/21 2100    sodium chloride flush 0.9 % injection 5-40 mL  5-40 mL IntraVENous PRN Milus Lapidus, APRN - CNP        0.9 % sodium chloride infusion  25 mL IntraVENous PRN Milus Lapidus, APRN - CNP        ondansetron (ZOFRAN-ODT) disintegrating tablet 4 mg  4 mg Oral Q8H PRN Milus Lapidus, APRN - CNP        Or    ondansetron [facility]) injection 4 mg  4 mg IntraVENous Q6H PRN Milus Lapidus, APRN - CNP        acetaminophen (TYLENOL) tablet 650 mg  650 mg Oral Q6H PRN Milus Lapidus, APRN - CNP        Or    acetaminophen (TYLENOL) suppository 650 mg  650 mg Rectal Q6H PRN Milus Lapidus, APRN - CNP        pantoprazole (PROTONIX) injection 40 mg  40 mg IntraVENous Q12H Milus Lapidus, APRN - CNP   40 mg at 11/30/21 1653    And    sodium chloride (PF) 0.9 % injection 10 mL  10 mL IntraVENous Q12H Milus Lapidus, APRN - CNP   10 mL at 11/30/21 1700    ceFAZolin (ANCEF) 2000 mg in sterile water 20 mL IV syringe  2,000 mg IntraVENous See Admin Instructions Milus Lapidus, APRN - CNP        ceFAZolin (ANCEF) 3,000 mg in dextrose 5 % 100 mL IVPB  3,000 mg IntraVENous See Admin Instructions Milus Lapidus, APRN - CNP           Allergies:     Allergies   Allergen Reactions    Morphine Itching    Other      bleach    Pcn [Penicillins] Rash       Problem List:    Patient Active Problem List   Diagnosis Code    Nausea & vomiting R11.2    MSSA bacteremia R78.81, B95.61    CLABSI (central line-associated bloodstream infection) T80.211A    ESRD on hemodialysis ([facility] [address] N18.6, Z99.2    Fever, unspecified R50.9    Sepsis ([facility] [address] A41.9    Upper GI bleed K92.2    Essential hypertension I10    Hyperlipidemia E78.5    Diabetes mellitus type 2 with complications ([facility] [address] V24.9    Neck pain M54.2    Anemia in CKD (chronic kidney disease) N18.9, D63.1       Past Medical History:        Diagnosis Date    Brain aneurysm     CLABSI (central line-associated bloodstream infection) 2021    Diabetes mellitus ([facility] [address]     ESRD on hemodialysis ([facility] [address] 2021    H/O heart artery stent     Hyperlipidemia     Hypertension        Past Surgical History:        Procedure Laterality Date    CARDIAC SURGERY       SECTION      VASCULAR SURGERY N/A 2021    CATHETER INSERTION  TUNNELED HEMODIALYSIS, WITH REMOVAL OF TEMPORARY CATHETER performed by Michael Servin MD at  [facility] [employer] History:    Social History     Tobacco Use    Smoking status: Former Smoker     Packs/day: 1.00     Quit date: 2017     Years since quittin.0    Smokeless tobacco: Never Used   Substance Use Topics    Alcohol use: Never                                Counseling given: Not Answered      Vital Signs (Current):   Vitals:    21 1655 21 1755 21 1924 21   BP: 134/66 (!) 105/58 (!) 134/59 134/70   Pulse: 74 74 72 72   Resp: 18 18 16 16   Temp: 98.2 °F (36.8 °C)  98.3 °F (36.8 °C) 98.2 °F (36.8 °C)   TempSrc: Oral  Oral Oral   SpO2: 97% 96% 95% 92%   Weight:       Height:                                                  BP Readings from Last 3 Encounters:   21 134/70   21 102/65   21 111/83       NPO Status: Time of last liquid consumption: 0000                        Time of last solid consumption: 0000                        Date of last liquid consumption: 21                        Date of last solid food consumption: 21    BMI:   Wt Readings from Last 3 Encounters:   21 252 lb 8 oz (114.5 kg)   21 247 lb 12.8 oz (112.4 kg)   21 240 lb 1.3 oz (108.9 kg)     Body mass index is 43.34 kg/m².     CBC:   Lab Results   Component Value Date    WBC 11.4 2021    RBC 2.35 2021    HGB 7.6 2021    HCT 24.0 2021    .1 2021    RDW 14.0 2021     2021       CMP:   Lab Results   Component Value Date     12/01/2021    K 5.0 12/01/2021    CL 96 12/01/2021    CO2 25 12/01/2021    BUN 32 12/01/2021    CREATININE 5.3 12/01/2021    GFRAA 10 12/01/2021    LABGLOM 8 12/01/2021    GLUCOSE 129 12/01/2021    PROT 6.9 12/01/2021    CALCIUM 8.2 12/01/2021    BILITOT 0.3 12/01/2021    ALKPHOS 109 12/01/2021    AST 15 12/01/2021    ALT <5 12/01/2021       POC Tests: No results for input(s): POCGLU, POCNA, POCK, POCCL, POCBUN, POCHEMO, POCHCT in the last 72 hours. Coags:   Lab Results   Component Value Date    PROTIME 11.7 11/30/2021    INR 1.1 11/30/2021    APTT 45.1 11/30/2021       HCG (If Applicable): No results found for: PREGTESTUR, PREGSERUM, HCG, HCGQUANT     ABGs: No results found for: PHART, PO2ART, ZAD1WWC, XVQ8SAQ, BEART, W9NKLBQC     Type & Screen (If Applicable):  No results found for: LABABO, LABRH    Drug/Infectious Status (If Applicable):  No results found for: HIV, HEPCAB    COVID-19 Screening (If Applicable):   Lab Results   Component Value Date    COVID19 Not Detected 11/17/2021       Narrative   EXAMINATION:   ONE XRAY VIEW OF THE CHEST PORTABLE SUPINE       11/30/2021 10:37 am       COMPARISON:   11/24/2021       HISTORY:   ORDERING SYSTEM PROVIDED HISTORY: epigastric pain   TECHNOLOGIST PROVIDED HISTORY:   Reason for exam:->epigastric pain       FINDINGS:   The right subclavian dialysis catheter appears unchanged in position.  Again   seen is short length mild kinking of the catheter at the level of the right   1st rib and the degree of catheter narrowing appears less conspicuous than   prior.  Otherwise, no significant change.  Cardiomegaly and mild pulmonary   venous congestion.  No acute pulmonary infiltrate or pleural effusion.  No   pneumothorax.          Anesthesia Evaluation  Patient summary reviewed and Nursing notes reviewed no history of anesthetic complications:   Airway: Mallampati: II  TM distance: >3 FB   Neck ROM: full  Mouth opening: > = 3 FB Dental: normal exam Pulmonary:Negative Pulmonary ROS and normal exam  breath sounds clear to auscultation      (-) not a current smoker (Former)                           Cardiovascular:  Exercise tolerance: good (>4 METS),   (+) hypertension: moderate, CAD: obstructive, CABG/stent (PTCA with KARMA):, pulmonary hypertension: mild, hyperlipidemia      ECG reviewed  Rhythm: regular  Rate: normal  Echocardiogram reviewed         Beta Blocker:  Dose within 24 Hrs      ROS comment: Sinus rhythm with marked sinus arrhythmia with occasional premature ventricular complexes  Moderate voltage criteria for LVH, may be normal variant  Nonspecific ST and T wave abnormality  Abnormal ECG  No previous ECGs available    Summary  No PENELOPE indication of Endocarditis. Normal left ventricular chamber size and systolic function. Left atrium is mildly enlarged. Interatrial septum intact, focal area of calcification near mid septum. No thrombus in left atrium or left atrial appendage. Normal right ventricle size and function. Moderately enlarged right atrium size. The aortic valve appears moderately sclerotic. No hemodynamically significant aortic stenosis, mean gradient 14mmHg, peak gradient 24mmHg, ERENDIRA 2.1cm2 by VTI, Vmax and  planimetry. There is mild tricuspid regurgitation. Mild Pulmonary hypertension, RVSP 40mmHg. Normal aortic root size. No evidence of pericardial effusion. No comparison study available. Neuro/Psych:                ROS comment: Cerebral aneurysm    Ambulatory dysfunction and fatigue GI/Hepatic/Renal:   (+) GERD: no interval change, renal disease (32/5.3 w/ GFR 8): ESRD and dialysis, morbid obesity (BMI 43.4 kg/m2)         ROS comment: Upper GI bleed.    Endo/Other:    (+) DiabetesType II DM, using insulin, blood dyscrasia (7.6/24.0): anemia:., .          Pt had no PAT visit        ROS comment: MSSA bacteremia    CLABSI (central line-associated bloodstream infection)     Abdominal:             Vascular: negative vascular ROS.         Other Findings:           Anesthesia Plan      MAC     ASA 4       Induction: intravenous. Plan discussed with TANESHA. TAWNYA Baum - TANESHA Jha DO   12/1/2021        History, data, and pertinent studies from chart review. Above represents information available via the shared medical record including previous anesthetic, medication, and allergy history.   Confirmation of above and final disposition per DOS anesthesiologist.    Oziel Jha DO  Staff Anesthesiologist  December 1, 2021  6:09 AM

## 2021-12-01 NOTE — BRIEF OP NOTE
Brief Postoperative Note    Dennise Kwok  YOB: 1955  [id number]    Procedure: EGD with biopsy    Anesthesia: [facility]    Surgeon:  Vielka Escoto MD    Findings:       Esophagus:  GERD      Stomach:  Gastritis bx done to rule out H. Pylori      Duodenum: scalloping  Bx. done to rule out sprue       Complications: None      Estimated blood loss: none      Taryn Weber MD

## 2021-12-02 ENCOUNTER — APPOINTMENT (OUTPATIENT)
Dept: MRI IMAGING | Age: 66
DRG: 314 | End: 2021-12-02
Payer: MEDICARE

## 2021-12-02 ENCOUNTER — APPOINTMENT (OUTPATIENT)
Dept: ULTRASOUND IMAGING | Age: 66
DRG: 314 | End: 2021-12-02
Payer: MEDICARE

## 2021-12-02 LAB
ALBUMIN SERPL-MCNC: 3 G/DL (ref 3.5–5.2)
ALP BLD-CCNC: 100 U/L (ref 35–104)
ALT SERPL-CCNC: <5 U/L (ref 0–32)
ANION GAP SERPL CALCULATED.3IONS-SCNC: 17 MMOL/L (ref 7–16)
AST SERPL-CCNC: 12 U/L (ref 0–31)
BASOPHILS ABSOLUTE: 0.04 E9/L (ref 0–0.2)
BASOPHILS RELATIVE PERCENT: 0.5 % (ref 0–2)
BILIRUB SERPL-MCNC: 0.2 MG/DL (ref 0–1.2)
BUN BLDV-MCNC: 47 MG/DL (ref 6–23)
CALCIUM SERPL-MCNC: 8 MG/DL (ref 8.6–10.2)
CHLORIDE BLD-SCNC: 96 MMOL/L (ref 98–107)
CLOTEST: NORMAL
CO2: 22 MMOL/L (ref 22–29)
CREAT SERPL-MCNC: 7 MG/DL (ref 0.5–1)
EOSINOPHILS ABSOLUTE: 0.28 E9/L (ref 0.05–0.5)
EOSINOPHILS RELATIVE PERCENT: 3.6 % (ref 0–6)
GFR AFRICAN AMERICAN: 7
GFR NON-AFRICAN AMERICAN: 6 ML/MIN/1.73
GLUCOSE BLD-MCNC: 177 MG/DL (ref 74–99)
HCT VFR BLD CALC: 22.4 % (ref 34–48)
HCT VFR BLD CALC: 23.3 % (ref 34–48)
HCT VFR BLD CALC: 24.4 % (ref 34–48)
HEMOGLOBIN: 7.2 G/DL (ref 11.5–15.5)
HEMOGLOBIN: 7.4 G/DL (ref 11.5–15.5)
HEMOGLOBIN: 7.5 G/DL (ref 11.5–15.5)
IMMATURE GRANULOCYTES #: 0.11 E9/L
IMMATURE GRANULOCYTES %: 1.4 % (ref 0–5)
LYMPHOCYTES ABSOLUTE: 1.43 E9/L (ref 1.5–4)
LYMPHOCYTES RELATIVE PERCENT: 18.4 % (ref 20–42)
MAGNESIUM: 2.3 MG/DL (ref 1.6–2.6)
MCH RBC QN AUTO: 32.7 PG (ref 26–35)
MCHC RBC AUTO-ENTMCNC: 31.8 % (ref 32–34.5)
MCV RBC AUTO: 103.1 FL (ref 80–99.9)
METER GLUCOSE: 109 MG/DL (ref 74–99)
METER GLUCOSE: 178 MG/DL (ref 74–99)
METER GLUCOSE: 270 MG/DL (ref 74–99)
MONOCYTES ABSOLUTE: 0.79 E9/L (ref 0.1–0.95)
MONOCYTES RELATIVE PERCENT: 10.2 % (ref 2–12)
NEUTROPHILS ABSOLUTE: 5.12 E9/L (ref 1.8–7.3)
NEUTROPHILS RELATIVE PERCENT: 65.9 % (ref 43–80)
PDW BLD-RTO: 13.8 FL (ref 11.5–15)
PHOSPHORUS: 8.1 MG/DL (ref 2.5–4.5)
PLATELET # BLD: 223 E9/L (ref 130–450)
PMV BLD AUTO: 10.1 FL (ref 7–12)
POTASSIUM REFLEX MAGNESIUM: 5.4 MMOL/L (ref 3.5–5)
POTASSIUM SERPL-SCNC: 5.4 MMOL/L (ref 3.5–5)
RBC # BLD: 2.26 E12/L (ref 3.5–5.5)
SODIUM BLD-SCNC: 135 MMOL/L (ref 132–146)
TOTAL PROTEIN: 7 G/DL (ref 6.4–8.3)
WBC # BLD: 7.8 E9/L (ref 4.5–11.5)

## 2021-12-02 PROCEDURE — 85018 HEMOGLOBIN: CPT

## 2021-12-02 PROCEDURE — 6370000000 HC RX 637 (ALT 250 FOR IP): Performed by: FAMILY MEDICINE

## 2021-12-02 PROCEDURE — 90935 HEMODIALYSIS ONE EVALUATION: CPT

## 2021-12-02 PROCEDURE — 85025 COMPLETE CBC W/AUTO DIFF WBC: CPT

## 2021-12-02 PROCEDURE — 85014 HEMATOCRIT: CPT

## 2021-12-02 PROCEDURE — C9113 INJ PANTOPRAZOLE SODIUM, VIA: HCPCS | Performed by: NURSE PRACTITIONER

## 2021-12-02 PROCEDURE — 6370000000 HC RX 637 (ALT 250 FOR IP): Performed by: STUDENT IN AN ORGANIZED HEALTH CARE EDUCATION/TRAINING PROGRAM

## 2021-12-02 PROCEDURE — 72141 MRI NECK SPINE W/O DYE: CPT

## 2021-12-02 PROCEDURE — 80048 BASIC METABOLIC PNL TOTAL CA: CPT

## 2021-12-02 PROCEDURE — 99233 SBSQ HOSP IP/OBS HIGH 50: CPT | Performed by: STUDENT IN AN ORGANIZED HEALTH CARE EDUCATION/TRAINING PROGRAM

## 2021-12-02 PROCEDURE — 6360000002 HC RX W HCPCS: Performed by: NURSE PRACTITIONER

## 2021-12-02 PROCEDURE — 84100 ASSAY OF PHOSPHORUS: CPT

## 2021-12-02 PROCEDURE — 1200000000 HC SEMI PRIVATE

## 2021-12-02 PROCEDURE — 2580000003 HC RX 258: Performed by: NURSE PRACTITIONER

## 2021-12-02 PROCEDURE — 6370000000 HC RX 637 (ALT 250 FOR IP): Performed by: NURSE PRACTITIONER

## 2021-12-02 PROCEDURE — 83735 ASSAY OF MAGNESIUM: CPT

## 2021-12-02 PROCEDURE — 76536 US EXAM OF HEAD AND NECK: CPT

## 2021-12-02 PROCEDURE — 80053 COMPREHEN METABOLIC PANEL: CPT

## 2021-12-02 PROCEDURE — 82962 GLUCOSE BLOOD TEST: CPT

## 2021-12-02 PROCEDURE — 36415 COLL VENOUS BLD VENIPUNCTURE: CPT

## 2021-12-02 PROCEDURE — APPSS30 APP SPLIT SHARED TIME 16-30 MINUTES: Performed by: NURSE PRACTITIONER

## 2021-12-02 RX ORDER — ASPIRIN 81 MG/1
81 TABLET ORAL DAILY
Status: DISCONTINUED | OUTPATIENT
Start: 2021-12-02 | End: 2021-12-03 | Stop reason: HOSPADM

## 2021-12-02 RX ORDER — CLOPIDOGREL BISULFATE 75 MG/1
75 TABLET ORAL DAILY
Status: DISCONTINUED | OUTPATIENT
Start: 2021-12-02 | End: 2021-12-03 | Stop reason: HOSPADM

## 2021-12-02 RX ORDER — LORAZEPAM 1 MG/1
1 TABLET ORAL ONCE
Status: COMPLETED | OUTPATIENT
Start: 2021-12-02 | End: 2021-12-02

## 2021-12-02 RX ADMIN — Medication 2000 MG: at 21:21

## 2021-12-02 RX ADMIN — CARVEDILOL 12.5 MG: 6.25 TABLET, FILM COATED ORAL at 17:43

## 2021-12-02 RX ADMIN — Medication 10 ML: at 07:41

## 2021-12-02 RX ADMIN — ASPIRIN 81 MG: 81 TABLET, COATED ORAL at 10:51

## 2021-12-02 RX ADMIN — SODIUM CHLORIDE, PRESERVATIVE FREE 10 ML: 5 INJECTION INTRAVENOUS at 00:14

## 2021-12-02 RX ADMIN — PANTOPRAZOLE SODIUM 40 MG: 40 INJECTION, POWDER, FOR SOLUTION INTRAVENOUS at 00:14

## 2021-12-02 RX ADMIN — INSULIN LISPRO 3 UNITS: 100 INJECTION, SOLUTION INTRAVENOUS; SUBCUTANEOUS at 06:26

## 2021-12-02 RX ADMIN — Medication 10 ML: at 20:07

## 2021-12-02 RX ADMIN — TRAMADOL HYDROCHLORIDE 50 MG: 50 TABLET, COATED ORAL at 10:55

## 2021-12-02 RX ADMIN — LORAZEPAM 1 MG: 1 TABLET ORAL at 11:14

## 2021-12-02 RX ADMIN — INSULIN LISPRO 3 UNITS: 100 INJECTION, SOLUTION INTRAVENOUS; SUBCUTANEOUS at 11:16

## 2021-12-02 RX ADMIN — ATORVASTATIN CALCIUM 40 MG: 40 TABLET, FILM COATED ORAL at 07:40

## 2021-12-02 RX ADMIN — CLOPIDOGREL BISULFATE 75 MG: 75 TABLET ORAL at 10:51

## 2021-12-02 RX ADMIN — TRAMADOL HYDROCHLORIDE 50 MG: 50 TABLET, COATED ORAL at 17:45

## 2021-12-02 RX ADMIN — CARVEDILOL 12.5 MG: 6.25 TABLET, FILM COATED ORAL at 07:40

## 2021-12-02 ASSESSMENT — PAIN SCALES - GENERAL
PAINLEVEL_OUTOF10: 8
PAINLEVEL_OUTOF10: 0
PAINLEVEL_OUTOF10: 7
PAINLEVEL_OUTOF10: 4
PAINLEVEL_OUTOF10: 6

## 2021-12-02 ASSESSMENT — PAIN DESCRIPTION - FREQUENCY
FREQUENCY: CONTINUOUS
FREQUENCY: CONTINUOUS

## 2021-12-02 ASSESSMENT — PAIN - FUNCTIONAL ASSESSMENT
PAIN_FUNCTIONAL_ASSESSMENT: ACTIVITIES ARE NOT PREVENTED
PAIN_FUNCTIONAL_ASSESSMENT: ACTIVITIES ARE NOT PREVENTED

## 2021-12-02 ASSESSMENT — PAIN DESCRIPTION - ORIENTATION
ORIENTATION: RIGHT;LOWER
ORIENTATION: RIGHT

## 2021-12-02 ASSESSMENT — PAIN DESCRIPTION - DESCRIPTORS
DESCRIPTORS: CONSTANT;ACHING
DESCRIPTORS: ACHING;THROBBING

## 2021-12-02 ASSESSMENT — PAIN DESCRIPTION - ONSET
ONSET: ON-GOING
ONSET: ON-GOING

## 2021-12-02 ASSESSMENT — PAIN DESCRIPTION - PAIN TYPE
TYPE: ACUTE PAIN
TYPE: ACUTE PAIN

## 2021-12-02 ASSESSMENT — PAIN DESCRIPTION - PROGRESSION
CLINICAL_PROGRESSION: NOT CHANGED
CLINICAL_PROGRESSION: NOT CHANGED

## 2021-12-02 ASSESSMENT — PAIN DESCRIPTION - DIRECTION
RADIATING_TOWARDS: NON-RADIATING
RADIATING_TOWARDS: NON-RADIATING

## 2021-12-02 ASSESSMENT — PAIN DESCRIPTION - LOCATION
LOCATION: NECK;SHOULDER
LOCATION: BACK;NECK;SHOULDER

## 2021-12-02 NOTE — OP NOTE
[id number] [address]                                OPERATIVE REPORT    PATIENT NAME: Michelle Richard                     :        1955  MED REC NO:   [id number]                            ROOM:       [id number]  ACCOUNT NO:   [de-identified]                           ADMIT DATE: 2021  PROVIDER:     Jeyson Santizo MD    DATE OF PROCEDURE:  2021    PROCEDURE PERFORMED:  Upper endoscopy with biopsy. PREOPERATIVE DIAGNOSES:  Evaluation for anemia and epigastric pain. POSTOPERATIVE DIAGNOSES:  Grade B LA classification GERD. Gastritis,  biopsied to rule out H. pylori infection. Duodenum showed scalloping  suspicious for celiac disease; biopsies were done. ANESTHESIA:  LMAC. ESTIMATED BLOOD LOSS:  N/A    CONSENT NOTE:  Prior to the procedure an informed consent was obtained  from the patient after explaining the benefits as well as the risks,  alternatives, and complications of the procedure to the patient, who  understood and agreed. DESCRIPTION OF PROCEDURE:  With the patient in the left lateral  decubitus position, the Olympus GIF-100 forward-viewing videoscope was  introduced into the esophagus, the evaluation of which showed grade B LA  classification GERD. No hiatal hernia was seen. The scope was then advanced through the gastroesophageal junction into  the gastric body, along the greater curvature. Evaluation of the body  of the stomach showed gastritis. Biopsies were taken from this area to  rule out Helicobacter pylori infection. The scope was then advanced through the pylorus into the duodenal bulb  and second portion of the duodenum. Duodenum showed some scalloping,  biopsied to rule out sprue.   The scope was then retrieved and  retroflexed in the prepyloric antrum, with thorough evaluation of the  cardiac and fundal portions of the stomach, which appeared to be within  normal limits. The scope was then straightened, the area deflated, and the procedure  was terminated by withdrawing the scope and conducting a second look on  the way out, which was essentially the same. The patient tolerated the procedure well.         Nhi Payton MD      D: 12/01/2021 15:54:46       T: 12/02/2021 0:09:11     [id number]  Job#: [id number]     Doc#: [id number]    CC:  Claude Penner, MD

## 2021-12-02 NOTE — PROGRESS NOTES
[facility] MD Madhav Sadler, MD Ronda Wills, MD Gustavo Parry, MD Yusef Mai, CASH Echavarria, YOSHI  Progress Note  12/2/2021    SUBJECTIVE:  We are following this patient for end-stage renal failure . 12/2: No complaint. Resting comfortably. ROS unremarkable. Pain in her neck has improved somewhat. No fever or chill. No melena or hematochezia.     PROBLEM LIST:    Patient Active Problem List   Diagnosis    Nausea & vomiting    MSSA bacteremia    CLABSI (central line-associated bloodstream infection)    ESRD on hemodialysis (HCC)    Fever, unspecified    Septicemia ([address]    Gastrointestinal hemorrhage    Essential hypertension    Hyperlipidemia    Diabetes mellitus type 2 with complications ([address]    Neck pain    Anemia in CKD (chronic kidney disease)        PAST MEDICAL HISTORY:    Past Medical History:   Diagnosis Date    Brain aneurysm     CLABSI (central line-associated bloodstream infection) 11/19/2021    Diabetes mellitus ([address]     ESRD on hemodialysis ([facility] [address] 11/19/2021    H/O heart artery stent     Hyperlipidemia     Hypertension        MEDS (scheduled):   [START ON 12/3/2021] epoetin sadia-epbx  30 Units/kg SubCUTAneous Once per day on Mon Wed Fri    aspirin  81 mg Oral Daily    clopidogrel  75 mg Oral Daily    atorvastatin  40 mg Oral Daily    carvedilol  12.5 mg Oral BID WC    insulin lispro  3 Units SubCUTAneous TID WC    sodium chloride flush  5-40 mL IntraVENous 2 times per day    pantoprazole  40 mg IntraVENous Q12H    And    sodium chloride (PF)  10 mL IntraVENous Q12H    ceFAZolin 2000 mg in 20 mL SWFI IV Syringe  2,000 mg IntraVENous See Admin Instructions    ceFAZolin  3,000 mg IntraVENous See Admin Instructions       MEDS (infusions):   sodium chloride         MEDS (prn):  traMADol, sodium chloride flush, sodium chloride, ondansetron **OR** ondansetron, acetaminophen **OR** acetaminophen    DIET:    ADULT DIET; Regular; Low Potassium (Less than 3000 mg/day)      PHYSICAL EXAM:      Patient Vitals for the past 24 hrs:   BP Temp Temp src Pulse Resp SpO2   12/02/21 0730 (!) 178/74 98.2 °F (36.8 °C) Oral 67 18 95 %   12/01/21 1530 (!) 142/69 98.8 °F (37.1 °C) Oral 86 18 93 %   12/01/21 1458 130/75 -- -- 65 18 95 %   12/01/21 1445 118/72 -- -- 63 18 95 %   12/01/21 1430 (!) 123/53 97.8 °F (36.6 °C) -- 63 18 98 %          Intake/Output Summary (Last 24 hours) at 12/2/2021 1117  Last data filed at 12/2/2021 0522  Gross per 24 hour   Intake 220 ml   Output --   Net 220 ml       Wt Readings from Last 3 Encounters:   11/30/21 252 lb 8 oz (114.5 kg)   11/24/21 247 lb 12.8 oz (112.4 kg)   09/04/21 240 lb 1.3 oz (108.9 kg)       General:  in no acute distress  HEENT: NC/AT, EOMI, sclera and conjunctiva are clear and anicteric. Mucous membranes moist.  Cardiovascular: regular rate and rhythm, no murmurs, gallops, or rubs  Respiratory:  Clear, no rales, rhochi, or wheezes  Gastrointestinal: soft, nontender, nondistended, NABS  Ext: no cyanosis, clubbing, or edema bilateral lower extremities  Neuro: awake, alert, oriented x3. Moves all 4 extremities. Cranial nerves II through XII grossly intact. Skin: dry, no rash      DATA:      Recent Labs     11/30/21  1150 11/30/21  1150 12/01/21  0228 12/01/21  0858 12/01/21  1605 12/02/21  0005 12/02/21  0505   WBC 19.4*  --  11.4  --   --   --  7.8   HGB 8.4*   < > 7.6*   < > 7.5* 7.5* 7.4*   HCT 26.5*   < > 24.0*   < > 24.3* 24.4* 23.3*   .5*  --  102.1*  --   --   --  103.1*     --  226  --   --   --  223    < > = values in this interval not displayed.      Recent Labs     11/30/21  1150 11/30/21  1150 12/01/21  0228 12/01/21  1513 12/02/21  0505     --  135  --  135   K 4.0   < > 5.0 5.3* 5.4*  5.4*   CL 97*  --  96*  --  96*   CO2 24  --  25  --  22   BUN 23  --  32*  --  47*   CREATININE 4.1*  --  5.3*  --  7.0*    < > = values in this interval not displayed. Iron studies:No results found for: FERRITIN  Bone disease:  Lab Results   Component Value Date    MG 2.3 12/02/2021    PHOS 8.1 (H) 12/02/2021     DIALYSIS ORDERS:    Reviewed    Assessment  1. 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 Anemia due to ESRD, as well as likely upper GI bleed, and acute blood loss  3. 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 History of hypertension, currently rather low, typically essential, typically well controlled  5. Neck pain, possibly vertebral osteomyelitis given recent bacteremia due to CLABSI    Recommendations  1. Continue IHD support for solute and volume clearance on Tuesdays Thursdays and Saturdays as per outpatient orders and dry weight  2. Given the need to administer gadolinium today even though it is low molecular weight and the risk for developing nephrogenic systemic fibrosis is low, still prudent to dialyze immediately after the MRI is performed, and plan for daily x3, 4-hour treatments, high flux dialyzer  3. Continue JHONATAN: Retacrit while here  4. Would not administer IV iron while here  5. Continue other aspects of renal management  6. Follow labs, BP  7.  Continue supportive care      Yee Broderick MD, MD  12/2/2021

## 2021-12-02 NOTE — PROGRESS NOTES
PROGRESS NOTE    Patient Presents with/Seen in Consultation For      *gi bleed  CHIEF COMPLAINT: Neck pain, dark tarry stools, fatigue, and difficulty ambulating    Subjective:     Patient off unit for testing. Chart/labs reviewed. Review of Systems  Aside from what was mentioned in the PMH and HPI, essentially unremarkable, all others negative. Objective:     BP (!) 93/48   Pulse 58   Temp 97.9 °F (36.6 °C)   Resp 16   Ht 5' 4\" (1.626 m)   Wt 252 lb 8 oz (114.5 kg)   SpO2 95%   BMI 43.34 kg/m²     Deferred, pt off unit for testing.      [START ON 12/3/2021] epoetin sadia-epbx (RETACRIT) injection 3,440 Units, Once per day on Mon Wed Fri  aspirin EC tablet 81 mg, Daily  clopidogrel (PLAVIX) tablet 75 mg, Daily  traMADol (ULTRAM) tablet 50 mg, Q6H PRN  atorvastatin (LIPITOR) tablet 40 mg, Daily  carvedilol (COREG) tablet 12.5 mg, BID WC  insulin lispro (HUMALOG) injection vial 3 Units, TID WC  sodium chloride flush 0.9 % injection 5-40 mL, 2 times per day  sodium chloride flush 0.9 % injection 5-40 mL, PRN  0.9 % sodium chloride infusion, PRN  ondansetron (ZOFRAN-ODT) disintegrating tablet 4 mg, Q8H PRN   Or  ondansetron (ZOFRAN) injection 4 mg, Q6H PRN  acetaminophen (TYLENOL) tablet 650 mg, Q6H PRN   Or  acetaminophen (TYLENOL) suppository 650 mg, Q6H PRN  pantoprazole (PROTONIX) injection 40 mg, Q12H   And  sodium chloride (PF) 0.9 % injection 10 mL, Q12H  ceFAZolin (ANCEF) 2000 mg in sterile water 20 mL IV syringe, See Admin Instructions  ceFAZolin (ANCEF) 3,000 mg in dextrose 5 % 100 mL IVPB, See Admin Instructions         Data Review  CBC:   Lab Results   Component Value Date    WBC 7.8 12/02/2021    RBC 2.26 12/02/2021    HGB 7.2 12/02/2021    HCT 22.4 12/02/2021    .1 12/02/2021    MCH 32.7 12/02/2021    MCHC 31.8 12/02/2021    RDW 13.8 12/02/2021     12/02/2021    MPV 10.1 12/02/2021     CMP:    Lab Results   Component Value Date     12/02/2021    K 5.4 12/02/2021    K 5.4 12/02/2021    CL 96 12/02/2021    CO2 22 12/02/2021    BUN 47 12/02/2021    CREATININE 7.0 12/02/2021    GFRAA 7 12/02/2021    LABGLOM 6 12/02/2021    GLUCOSE 177 12/02/2021    PROT 7.0 12/02/2021    LABALBU 3.0 12/02/2021    CALCIUM 8.0 12/02/2021    BILITOT 0.2 12/02/2021    ALKPHOS 100 12/02/2021    AST 12 12/02/2021    ALT <5 12/02/2021     Hepatic Function Panel:    Lab Results   Component Value Date    ALKPHOS 100 12/02/2021    ALT <5 12/02/2021    AST 12 12/02/2021    PROT 7.0 12/02/2021    BILITOT 0.2 12/02/2021    LABALBU 3.0 12/02/2021     No components found for: CHLPL  No results found for: TRIG  No results found for: HDL  No results found for: LDLCALC  No results found for: LABVLDL   PT/INR:    Lab Results   Component Value Date    PROTIME 11.7 11/30/2021    INR 1.1 11/30/2021     IRON:    Lab Results   Component Value Date    IRON 59 12/01/2021     Iron Saturation:  No components found for: PERCENTFE  FERRITIN:  No results found for: FERRITIN      Assessment:     Active Problems:  · Anemia, macrocytic suspicious for GI blood loss, melanotic stool  · ESRD on HD  · Elevated alk phos, mildly elevated  · Right neck pain radiating to right shoulder, arm, and down back-defer to PCP  · MSSA bacteremia associated to CLABSI -ID following (hemodialysis catheter removed and replaced)  · Leukocytosis -ID following  · EGD 12//1/21 - GERD, Gastritis, BRANDIE pending. Duodenum with scalloping, bx done and pending to r/o sprue. · Thyroid nodule - defer    Plan:     · MRI results noted - defer to PCP  · Heating pad to right neck  · Medicate for pain per PCP  · Antibiotics per ID  · Defer comorbidities to others  · Supportive care  · Diet as tolerated  · Continue to monitor    Note: This report was completed utilizing computer voice recognition software. Every effort has been made to ensure accuracy, however; inadvertent computerized transcription errors may be present.      Discussed with Dr. Rafa Norris per  Lena Pham [id number], FNP-BC 12/2/2021 3:28 PM For Dr. Lena Servin

## 2021-12-02 NOTE — PATIENT CARE CONFERENCE
P Quality Flow/Interdisciplinary Rounds Progress Note        Quality Flow Rounds held on December 2, 2021    Disciplines Attending:  Bedside Nurse, ,  and Nursing Unit Leadership    Christian Fernandez was admitted on 11/30/2021 10:14 AM    Anticipated Discharge Date:  Expected Discharge Date: 12/04/21    Disposition:    Dm Score:  Dm Scale Score: 19    Readmission Risk              Risk of Unplanned Readmission:  21           Discussed patient goal for the day, patient clinical progression, and barriers to discharge.   The following Goal(s) of the Day/Commitment(s) have been identified:  Labs - Report Results      Kobi Tripathi RN  December 2, 2021

## 2021-12-02 NOTE — PROGRESS NOTES
[facility] Progress Note    Admitting Date and Time: 11/30/2021 10:14 AM  Admit Dx: Neck pain [M54.2]  Septicemia ([facility] [address] [A41.9]  Sepsis ([facility] [address] [A41.9]  Gastrointestinal hemorrhage, unspecified gastrointestinal hemorrhage type [K92.2]    Chief Compliant: Neck Pain, Dark Stools    Subjective: Patient seen sitting up in the chair awake and alert in no distress. She reports that today she is feeling better. Still with some pain in her neck but thinks it may be a little bit better. She denies any shortness of breath or difficulty breathing. Denies any fever or chills. Anxious about the MRI today. Daughter present at the bedside and updated on condition as well as plan of care, all questions answered. Discussed with Dr. Shannon Baumann during rounds - plan remains for HD today after the MRI and then treatment again Friday and Saturday. No other issues or concerns from nursing. Full 10 point ROS negative unless mentioned above.      [START ON 12/3/2021] epoetin sadia-epbx  30 Units/kg SubCUTAneous Once per day on Mon Wed Fri    atorvastatin  40 mg Oral Daily    carvedilol  12.5 mg Oral BID WC    insulin lispro  3 Units SubCUTAneous TID WC    sodium chloride flush  5-40 mL IntraVENous 2 times per day    pantoprazole  40 mg IntraVENous Q12H    And    sodium chloride (PF)  10 mL IntraVENous Q12H    ceFAZolin 2000 mg in 20 mL SWFI IV Syringe  2,000 mg IntraVENous See Admin Instructions    ceFAZolin  3,000 mg IntraVENous See Admin Instructions     traMADol, 50 mg, Q6H PRN  sodium chloride flush, 5-40 mL, PRN  sodium chloride, 25 mL, PRN  ondansetron, 4 mg, Q8H PRN   Or  ondansetron, 4 mg, Q6H PRN  acetaminophen, 650 mg, Q6H PRN   Or  acetaminophen, 650 mg, Q6H PRN         Objective:    BP (!) 178/74   Pulse 67   Temp 98.2 °F (36.8 °C) (Oral)   Resp 18   Ht 5' 4\" (1.626 m)   Wt 252 lb 8 oz (114.5 kg)   SpO2 95%   BMI 43.34 kg/m²   General Appearance: awake and alert, oriented x 3, in no distress  Skin: warm and dry, no rashes or lesions noted  Head: normocephalic and atraumatic  Eyes: pupils equal, round, and reactive to light, conjunctivae normal  Neck: supple and non tender without mass, no lymphadenopathy noted  Pulmonary/Chest: clear throughout, respirations even and non-labored on room air  Cardiovascular: regular rate and rhythm, S1S2 present, no murmur noted, right chest wall TDC  Abdomen: obese, soft, non-tender, non-distended, normal bowel sounds  Musculoskeletal: 4/5 strength to all extremities, pain with ROM to the neck, no other joint swelling/tenderness noted  Extremities: 1+ edema to BLE with tubi  in place, no cyanosis or clubbing noted  Neurologic: no cranial nerve deficit and speech         Recent Labs     11/30/21  1150 11/30/21  1150 12/01/21  0228 12/01/21  1513 12/02/21  0505     --  135  --  135   K 4.0   < > 5.0 5.3* 5.4*  5.4*   CL 97*  --  96*  --  96*   CO2 24  --  25  --  22   BUN 23  --  32*  --  47*   CREATININE 4.1*  --  5.3*  --  7.0*   GLUCOSE 131*  --  129*  --  177*   CALCIUM 8.2*  --  8.2*  --  8.0*    < > = values in this interval not displayed. Recent Labs     11/30/21  1150 11/30/21  1150 12/01/21  0228 12/01/21  0858 12/01/21  1605 12/02/21  0005 12/02/21  0505   WBC 19.4*  --  11.4  --   --   --  7.8   RBC 2.61*  --  2.35*  --   --   --  2.26*   HGB 8.4*   < > 7.6*   < > 7.5* 7.5* 7.4*   HCT 26.5*   < > 24.0*   < > 24.3* 24.4* 23.3*   .5*  --  102.1*  --   --   --  103.1*   MCH 32.2  --  32.3  --   --   --  32.7   MCHC 31.7*  --  31.7*  --   --   --  31.8*   RDW 13.9  --  14.0  --   --   --  13.8     --  226  --   --   --  223   MPV 9.9  --  10.2  --   --   --  10.1    < > = values in this interval not displayed.        Assessment:    Active Problems:    MSSA bacteremia    CLABSI (central line-associated bloodstream infection)    ESRD on hemodialysis ([facility] [address]    Septicemia (HCC)    Gastrointestinal hemorrhage    Essential hypertension    Hyperlipidemia    Neck pain    Anemia in CKD (chronic kidney disease)  Resolved Problems:    * No resolved hospital problems. *      Plan:  1. CLABSI with MSSA Bacteremia: s/p TDC removal, PENELOPE done during prior admission and negative for vegetation, continue IV Ancef TTS x 4 weeks, ID following  2. Possible Upper GI Bleed: patient with dark tarry stools, s/p EGD 12/1 - noting GERD/gastritis and scalloping of the duodenum but no active bleeding. Hemoglobin has dropped slightly since admission but overall remains stable, will continue PPI BID for now, monitor H&H  3.  Neck Pain: was present during prior admission, CT cervical spine with degenerative changes, will check MRI of the cervical spine w/wo contrast to evaluate for possible discitis/abscess; will be done tomorrow  4. Leukocytosis: worsened since recent discharge, possibly related to GI bleed vs infectious process, WBC has normalized and patient remains afebrile, continue IV Ancef - ID following  5. ESRD: continue HD as per Renal - plan for treatment today after the MRI as well as Friday and Saturday  6. Anemia in CKD: could also be a component of blood loss given dark tarry stools, will monitor H&H, transfuse to keep hemoglobin >7.0  7. Essential Hypertension: continue medications and monitor BP closely, adjust as indicated  8. Hyperlipidemia: continue statin  9. CAD: ASA/Plavix held on admission given concern for GI bleed, EGD with no active bleeding and H&H is stable, will resume ASA/Plavix  10. Hyperkalemia: related to ESRD, patient for HD today - management as per Renal  11. Physical Debility: patient reports difficulty ambulating and fatigue, PT/OT following, [facility] 20/24  12. Disposition: await results of MRI, if negative will discuss with Renal about possible discharge tomorrow after HD and plan to resume regularly scheduled HD on Saturday      NOTE: This report was transcribed using voice recognition software.  Every effort was made to ensure accuracy; however, inadvertent computerized transcription errors may be present. Electronically signed by Sera Escudero CNP on 12/2/2021 at 10:20 AM

## 2021-12-02 NOTE — PROGRESS NOTES
[address] [place] [address] [facility]  [facility]  Progress Note    SUBJECTIVE:  Chief Complaint   Patient presents with    Rectal Bleeding     pt arrives with ems from dialysis. report of black tarry stools this am. pt had low hgb reported at dialysis 7ish.  Nausea     nausea reported since this am with dry heaves no emesis.  Neck Pain     neck pain since pt discharge from [facility] main friday. 9/10 sharp pain. No new problems reported by nursing. Review of systems:  As stated above in the chief complaint, otherwise negative. Medications:  Scheduled Meds:   [START ON 12/3/2021] epoetin sadia-epbx  30 Units/kg SubCUTAneous Once per day on     aspirin  81 mg Oral Daily    clopidogrel  75 mg Oral Daily    atorvastatin  40 mg Oral Daily    carvedilol  12.5 mg Oral BID WC    insulin lispro  3 Units SubCUTAneous TID WC    sodium chloride flush  5-40 mL IntraVENous 2 times per day    pantoprazole  40 mg IntraVENous Q12H    And    sodium chloride (PF)  10 mL IntraVENous Q12H    ceFAZolin 2000 mg in 20 mL SWFI IV Syringe  2,000 mg IntraVENous See Admin Instructions    ceFAZolin  3,000 mg IntraVENous See Admin Instructions     Continuous Infusions:   sodium chloride       PRN Meds:traMADol, sodium chloride flush, sodium chloride, ondansetron **OR** ondansetron, acetaminophen **OR** acetaminophen    OBJECTIVE:  /60   Pulse 59   Temp 97.9 °F (36.6 °C)   Resp 16   Ht 5' 4\" (1.626 m)   Wt 252 lb 8 oz (114.5 kg)   SpO2 95%   BMI 43.34 kg/m²   Temp  Av.2 °F (36.8 °C)  Min: 97.8 °F (36.6 °C)  Max: 98.8 °F (37.1 °C)  The patient is on dialysis.     Laboratory and Tests Review:  Lab Results   Component Value Date    WBC 7.8 2021    WBC 11.4 2021    WBC 19.4 (H) 2021    HGB 7.4 (L) 2021    HCT 23.3 (L) 2021    .1 (H) 2021     2021     Lab Results   Component Value Date    NEUTROABS 5.12 2021    NEUTROABS 8.60 (H) 12/01/2021    NEUTROABS 15.60 (H) 11/30/2021     No results found for: CRPHS  Lab Results   Component Value Date    ALT <5 12/02/2021    AST 12 12/02/2021    ALKPHOS 100 12/02/2021    BILITOT 0.2 12/02/2021     Lab Results   Component Value Date     12/02/2021    K 5.4 12/02/2021    K 5.4 12/02/2021    CL 96 12/02/2021    CO2 22 12/02/2021    BUN 47 12/02/2021    CREATININE 7.0 12/02/2021    CREATININE 5.3 12/01/2021    CREATININE 4.1 11/30/2021    GFRAA 7 12/02/2021    LABGLOM 6 12/02/2021    GLUCOSE 177 12/02/2021    PROT 7.0 12/02/2021    LABALBU 3.0 12/02/2021    CALCIUM 8.0 12/02/2021    BILITOT 0.2 12/02/2021    ALKPHOS 100 12/02/2021    AST 12 12/02/2021    ALT <5 12/02/2021     No results found for: CRP  Lab Results   Component Value Date    SEDRATE 103 (H) 11/21/2021     Radiology:  MRI reviewed    Microbiology:   Blood cultures: negative so far     ASSESSMENT:  · CLABSI secondary to tunneled HD catheter infection. Status post removal and replacement  · MSSA bacteremia associated to CLABSI  · Neck pain.   Possible vertebral osteomyelitis versus epidural abscess  · Probable GI bleed  · Leukocytosis associated with GI bleed, improved     PLAN:  · Continue Cefazolin 2-2-3 with HD until at least 1/1/2022  · The patient can be discharged from [address] [place] [address] with nursing    Matteo Barrow MD  1:43 PM  12/2/2021

## 2021-12-02 NOTE — CONSULTS
[facility] MD Tamiko Hanley, MD Anali Hall MD Porfirio Hu, YOSHI Beltre  Consultation  Patient's Name: Indigo Orantes  8:04 PM  12/1/2021  Seen this morning    Nephrologist: Tamiko Graham MD    Reason for Consult: ESRD management  Requesting Physician:  No primary care provider on file. Chief Complaint: Neck pain, possible vertebral osteomyelitis    History Obtained From: Patient, chart    History of Present Ilness:    Ms. Sheree Jaquez is a pleasant 21-year-old woman known to service. She recently moved to the [facility] from [facility], outside of [place], to live with her family. She has ESRD, undergoes chronic intermittent hemodialysis on Tuesdays Thursdays and Saturdays through a right IJ tunneled dialysis catheter. With recent infection of the catheter. She has a left upper extremity AV fistula, placed in [place], but which is not appropriately matured, and is a bit deep for cannulation. She is in fact due to be evaluated by Dr. Gloria Nava for revision in the near future (in fact, I believe she has an appointment with him today). She was [facility]. At times she is bacteremic. Catheter was removed, she was dialyzed with temporary catheter, and tunneled dialysis catheter was ultimately replaced. She presents with 2 problems: Melena, fatigue, malaise, hypotension. She has been lightheaded. Has been diagnosed with GI bleed and is for EGD this afternoon. Persistent neck pain, leukocytosis. There is a question of vertebral osteomyelitis. She is for MRI evaluation with gadolinium tomorrow    She otherwise is feeling pretty well. Denies fever or chill, nausea or vomiting, anorexia or recent weight loss, chest pain or palpitations, shortness of breath or dyspnea with exertion, cough wheeze or sputum production.   No abdominal pain or cramping, diarrhea, dysuria or hematuria. No peripheral swelling. No new skin rash or lesion. Past Medical History:   Diagnosis Date    Brain aneurysm     CLABSI (central line-associated bloodstream infection) 2021    Diabetes mellitus ([facility] [address]     ESRD on hemodialysis ([facility] [address] 2021    H/O heart artery stent     Hyperlipidemia     Hypertension        Past Surgical History:   Procedure Laterality Date    CARDIAC SURGERY       SECTION      VASCULAR SURGERY N/A 2021    CATHETER INSERTION  TUNNELED HEMODIALYSIS, WITH REMOVAL OF TEMPORARY CATHETER performed by Gely Trevino MD at [facility] OR       Family History   Problem Relation Age of Onset    Coronary Art Dis Mother     Coronary Art Dis Father     Coronary Art Dis Brother         reports that she quit smoking about 4 years ago. She smoked 1.00 pack per day. She has never used smokeless tobacco. She reports that she does not drink alcohol and does not use drugs.     Allergies:  Morphine, Other, and Pcn [penicillins]    Current Medications:    traMADol (ULTRAM) tablet 50 mg, Q6H PRN  atorvastatin (LIPITOR) tablet 40 mg, Daily  carvedilol (COREG) tablet 12.5 mg, BID WC  insulin lispro (HUMALOG) injection vial 3 Units, TID WC  sodium chloride flush 0.9 % injection 5-40 mL, 2 times per day  sodium chloride flush 0.9 % injection 5-40 mL, PRN  0.9 % sodium chloride infusion, PRN  ondansetron (ZOFRAN-ODT) disintegrating tablet 4 mg, Q8H PRN   Or  ondansetron (ZOFRAN) injection 4 mg, Q6H PRN  acetaminophen (TYLENOL) tablet 650 mg, Q6H PRN   Or  acetaminophen (TYLENOL) suppository 650 mg, Q6H PRN  pantoprazole (PROTONIX) injection 40 mg, Q12H   And  sodium chloride (PF) 0.9 % injection 10 mL, Q12H  ceFAZolin (ANCEF) 2000 mg in sterile water 20 mL IV syringe, See Admin Instructions  ceFAZolin (ANCEF) 3,000 mg in dextrose 5 % 100 mL IVPB, See Admin Instructions        Review of Systems:   As per above    Physical exam:   BP (!) 142/69   Pulse 86   Temp 98.8 °F (37.1 °C) (Oral)   Resp 18   Ht 5' 4\" (1.626 m)   Wt 252 lb 8 oz (114.5 kg)   SpO2 93%   BMI 43.34 kg/m²   Obese age-appropriate white woman in no apparent distress  NC/AT EOMI sclera conjunctiva clear and anicteric mucous membranes are moist  Neck soft supple trachea midline no carotid bruit  CTA bilateral  Regular rhythm normal S1 and S2 no murmur  Abdomen soft nontender nondistended normal active bowel sounds  Distal extremities are without edema  No rash or lesion on visible portions of integument  Moves all 4 extremities  Cranial nerves II through XII grossly intact  Awake, alert, interactive and appropriate    Data:   Labs:  CBC with Differential:    Lab Results   Component Value Date    WBC 11.4 12/01/2021    RBC 2.35 12/01/2021    HGB 7.5 12/01/2021    HCT 24.3 12/01/2021     12/01/2021    .1 12/01/2021    MCH 32.3 12/01/2021    MCHC 31.7 12/01/2021    RDW 14.0 12/01/2021    LYMPHOPCT 12.2 12/01/2021    MONOPCT 7.4 12/01/2021    BASOPCT 0.4 12/01/2021    MONOSABS 0.85 12/01/2021    LYMPHSABS 1.39 12/01/2021    EOSABS 0.34 12/01/2021    BASOSABS 0.04 12/01/2021     CMP:    Lab Results   Component Value Date     12/01/2021    K 5.3 12/01/2021    K 5.0 12/01/2021    CL 96 12/01/2021    CO2 25 12/01/2021    BUN 32 12/01/2021    CREATININE 5.3 12/01/2021    GFRAA 10 12/01/2021    LABGLOM 8 12/01/2021    GLUCOSE 129 12/01/2021    PROT 6.9 12/01/2021    LABALBU 3.1 12/01/2021    CALCIUM 8.2 12/01/2021    BILITOT 0.3 12/01/2021    ALKPHOS 109 12/01/2021    AST 15 12/01/2021    ALT <5 12/01/2021     Ionized Calcium:  No results found for: IONCA  Magnesium:    Lab Results   Component Value Date    MG 2.0 12/01/2021     Phosphorus:    Lab Results   Component Value Date    PHOS 5.2 12/01/2021     U/A:    Lab Results   Component Value Date    COLORU Yellow 11/18/2021    PHUR 6.0 11/18/2021    WBCUA 1-3 11/18/2021    RBCUA 0-1 11/18/2021    BACTERIA FEW 11/18/2021    CLARITYU Clear 11/18/2021 SPECGRAV 1.020 11/18/2021    LEUKOCYTESUR Negative 11/18/2021    UROBILINOGEN 0.2 11/18/2021    BILIRUBINUR Negative 11/18/2021    BLOODU SMALL 11/18/2021    GLUCOSEU 500 11/18/2021     Microalbumen/Creatinine ratio:  No components found for: RUCREAT  Iron Saturation:  No components found for: PERCENTFE  TIBC:    Lab Results   Component Value Date    TIBC 171 12/01/2021     FERRITIN:  No results found for: FERRITIN     Imaging:  XR CHEST PORTABLE   Final Result   1. Cardiomegaly and mild pulmonary venous congestion, unchanged. No   pneumonia identified. 2.  Right subclavian dialysis catheter with kinking, as above. CT Cervical Spine WO Contrast   Final Result   1. There is no acute compression fracture or subluxation of the cervical   spine. 2. Advanced multilevel degenerative disc and degenerative joint disease. 3. If the patient's pain persists dedicated follow-up MRI is recommended for   further evaluation. MRI CERVICAL SPINE W WO CONTRAST    (Results Pending)       Assessment  1. 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 Anemia due to ESRD, as well as likely upper GI bleed, and acute blood loss  3. 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 History of hypertension, currently rather low, typically essential, typically well controlled  5. Neck pain, possibly vertebral osteomyelitis given recent bacteremia due to CLABSI    Recommendations  1. Continue IHD support for solute and volume clearance on Tuesdays Thursdays and Saturdays as per outpatient orders and dry weight  2.  Given the need to administer gadolinium tomorrow, even though it is low molecular weight and the risk for developing nephrogenic systemic fibrosis is low, still prudent to dialyze immediately after the MRI is performed, and plan for daily x3, 4-hour treatments, high flux dialyzer  3. Continue JHONATAN: Retacrit while here  4. Would not administer IV iron while here  5. Continue other aspects of renal management  6. Follow labs, BMP  7. Continue supportive care      Thank you for the opportunity to participate in the care of your pleasant patient. We look forward to following along with you.         Electronically signed by Karan Randall MD on 12/1/2021 at 8:04 PM

## 2021-12-02 NOTE — FLOWSHEET NOTE
HD x 4 hours completed as ordered. UF 2000mls, Dressing changed. Vitals stable throughout tx. . Report given

## 2021-12-03 VITALS
RESPIRATION RATE: 18 BRPM | HEIGHT: 64 IN | SYSTOLIC BLOOD PRESSURE: 133 MMHG | TEMPERATURE: 98.6 F | WEIGHT: 252.5 LBS | BODY MASS INDEX: 43.11 KG/M2 | DIASTOLIC BLOOD PRESSURE: 57 MMHG | OXYGEN SATURATION: 94 % | HEART RATE: 66 BPM

## 2021-12-03 PROBLEM — A41.9 SEPTICEMIA (HCC): Status: RESOLVED | Noted: 2021-11-30 | Resolved: 2021-12-03

## 2021-12-03 PROBLEM — K92.2 GASTROINTESTINAL HEMORRHAGE: Status: RESOLVED | Noted: 2021-11-30 | Resolved: 2021-12-03

## 2021-12-03 LAB
ALBUMIN SERPL-MCNC: 3.4 G/DL (ref 3.5–5.2)
ALP BLD-CCNC: 112 U/L (ref 35–104)
ALT SERPL-CCNC: <5 U/L (ref 0–32)
ANION GAP SERPL CALCULATED.3IONS-SCNC: 16 MMOL/L (ref 7–16)
AST SERPL-CCNC: 12 U/L (ref 0–31)
BASOPHILS ABSOLUTE: 0.06 E9/L (ref 0–0.2)
BASOPHILS RELATIVE PERCENT: 0.7 % (ref 0–2)
BILIRUB SERPL-MCNC: 0.2 MG/DL (ref 0–1.2)
BUN BLDV-MCNC: 26 MG/DL (ref 6–23)
CALCIUM SERPL-MCNC: 8.3 MG/DL (ref 8.6–10.2)
CHLORIDE BLD-SCNC: 97 MMOL/L (ref 98–107)
CO2: 26 MMOL/L (ref 22–29)
CREAT SERPL-MCNC: 5 MG/DL (ref 0.5–1)
EOSINOPHILS ABSOLUTE: 0.26 E9/L (ref 0.05–0.5)
EOSINOPHILS RELATIVE PERCENT: 3.2 % (ref 0–6)
GFR AFRICAN AMERICAN: 10
GFR NON-AFRICAN AMERICAN: 9 ML/MIN/1.73
GLUCOSE BLD-MCNC: 155 MG/DL (ref 74–99)
HCT VFR BLD CALC: 25.2 % (ref 34–48)
HEMOGLOBIN: 7.9 G/DL (ref 11.5–15.5)
IMMATURE GRANULOCYTES #: 0.09 E9/L
IMMATURE GRANULOCYTES %: 1.1 % (ref 0–5)
LYMPHOCYTES ABSOLUTE: 1.05 E9/L (ref 1.5–4)
LYMPHOCYTES RELATIVE PERCENT: 12.7 % (ref 20–42)
MCH RBC QN AUTO: 32.4 PG (ref 26–35)
MCHC RBC AUTO-ENTMCNC: 31.3 % (ref 32–34.5)
MCV RBC AUTO: 103.3 FL (ref 80–99.9)
METER GLUCOSE: 182 MG/DL (ref 74–99)
METER GLUCOSE: 197 MG/DL (ref 74–99)
MONOCYTES ABSOLUTE: 0.69 E9/L (ref 0.1–0.95)
MONOCYTES RELATIVE PERCENT: 8.4 % (ref 2–12)
NEUTROPHILS ABSOLUTE: 6.09 E9/L (ref 1.8–7.3)
NEUTROPHILS RELATIVE PERCENT: 73.9 % (ref 43–80)
PDW BLD-RTO: 13.7 FL (ref 11.5–15)
PLATELET # BLD: 178 E9/L (ref 130–450)
PMV BLD AUTO: 10.4 FL (ref 7–12)
POTASSIUM REFLEX MAGNESIUM: 4.1 MMOL/L (ref 3.5–5)
RBC # BLD: 2.44 E12/L (ref 3.5–5.5)
SODIUM BLD-SCNC: 139 MMOL/L (ref 132–146)
TOTAL PROTEIN: 7.4 G/DL (ref 6.4–8.3)
WBC # BLD: 8.2 E9/L (ref 4.5–11.5)

## 2021-12-03 PROCEDURE — 2580000003 HC RX 258: Performed by: NURSE PRACTITIONER

## 2021-12-03 PROCEDURE — 99239 HOSP IP/OBS DSCHRG MGMT >30: CPT | Performed by: STUDENT IN AN ORGANIZED HEALTH CARE EDUCATION/TRAINING PROGRAM

## 2021-12-03 PROCEDURE — 36415 COLL VENOUS BLD VENIPUNCTURE: CPT

## 2021-12-03 PROCEDURE — 85025 COMPLETE CBC W/AUTO DIFF WBC: CPT

## 2021-12-03 PROCEDURE — 80053 COMPREHEN METABOLIC PANEL: CPT

## 2021-12-03 PROCEDURE — 6360000002 HC RX W HCPCS: Performed by: INTERNAL MEDICINE

## 2021-12-03 PROCEDURE — 6360000002 HC RX W HCPCS: Performed by: NURSE PRACTITIONER

## 2021-12-03 PROCEDURE — 97530 THERAPEUTIC ACTIVITIES: CPT

## 2021-12-03 PROCEDURE — 82962 GLUCOSE BLOOD TEST: CPT

## 2021-12-03 PROCEDURE — C9113 INJ PANTOPRAZOLE SODIUM, VIA: HCPCS | Performed by: NURSE PRACTITIONER

## 2021-12-03 PROCEDURE — 6370000000 HC RX 637 (ALT 250 FOR IP): Performed by: NURSE PRACTITIONER

## 2021-12-03 PROCEDURE — APPSS45 APP SPLIT SHARED TIME 31-45 MINUTES: Performed by: NURSE PRACTITIONER

## 2021-12-03 RX ORDER — TRAMADOL HYDROCHLORIDE 50 MG/1
50 TABLET ORAL EVERY 6 HOURS PRN
Qty: 28 TABLET | Refills: 0 | Status: SHIPPED | OUTPATIENT
Start: 2021-12-03 | End: 2021-12-10

## 2021-12-03 RX ORDER — TRAMADOL HYDROCHLORIDE 50 MG/1
50 TABLET ORAL EVERY 6 HOURS PRN
Qty: 28 TABLET | Refills: 0 | Status: SHIPPED | OUTPATIENT
Start: 2021-12-03 | End: 2021-12-03

## 2021-12-03 RX ORDER — PANTOPRAZOLE SODIUM 40 MG/1
40 TABLET, DELAYED RELEASE ORAL
Qty: 30 TABLET | Refills: 0 | Status: SHIPPED | OUTPATIENT
Start: 2021-12-03 | End: 2022-08-23

## 2021-12-03 RX ADMIN — INSULIN LISPRO 3 UNITS: 100 INJECTION, SOLUTION INTRAVENOUS; SUBCUTANEOUS at 07:18

## 2021-12-03 RX ADMIN — CLOPIDOGREL BISULFATE 75 MG: 75 TABLET ORAL at 09:53

## 2021-12-03 RX ADMIN — PANTOPRAZOLE SODIUM 40 MG: 40 INJECTION, POWDER, FOR SOLUTION INTRAVENOUS at 02:10

## 2021-12-03 RX ADMIN — INSULIN LISPRO 3 UNITS: 100 INJECTION, SOLUTION INTRAVENOUS; SUBCUTANEOUS at 11:41

## 2021-12-03 RX ADMIN — ASPIRIN 81 MG: 81 TABLET, COATED ORAL at 09:53

## 2021-12-03 RX ADMIN — SODIUM CHLORIDE, PRESERVATIVE FREE 10 ML: 5 INJECTION INTRAVENOUS at 02:10

## 2021-12-03 RX ADMIN — EPOETIN ALFA-EPBX 3440 UNITS: 4000 INJECTION, SOLUTION INTRAVENOUS; SUBCUTANEOUS at 09:58

## 2021-12-03 RX ADMIN — ATORVASTATIN CALCIUM 40 MG: 40 TABLET, FILM COATED ORAL at 09:53

## 2021-12-03 RX ADMIN — CARVEDILOL 12.5 MG: 6.25 TABLET, FILM COATED ORAL at 09:53

## 2021-12-03 RX ADMIN — Medication 10 ML: at 09:53

## 2021-12-03 NOTE — PROGRESS NOTES
[facility] MD Michael Dewey, MD Raymundo Marcos, MD Pawan Wellington, CNP   Jaja Echavarria, YOSHI  Progress Note  12/3/2021    SUBJECTIVE:  We are following this patient for end-stage renal failure . 12/2: No complaint. Resting comfortably. ROS unremarkable. Pain in her neck has improved somewhat. No fever or chill. No melena or hematochezia. 12/3 : seen in her room , feels ok , on RA , euvolemic .  Stable vitals   MRI done with no gadoliunium   No reported abcesses     PROBLEM LIST:    Patient Active Problem List   Diagnosis    Nausea & vomiting    MSSA bacteremia    CLABSI (central line-associated bloodstream infection)    [facility] ([facility]    Fever, unspecified    Essential hypertension    Hyperlipidemia    Diabetes mellitus type 2 with complications ([facility] [address]    Neck pain    Anemia in CKD (chronic kidney disease)        PAST MEDICAL HISTORY:    Past Medical History:   Diagnosis Date    Brain aneurysm     CLABSI (central line-associated bloodstream infection) 11/19/2021    Diabetes mellitus ([facility] [address]     ESRD on hemodialysis ([facility] [address] 11/19/2021    H/O heart artery stent     Hyperlipidemia     Hypertension        MEDS (scheduled):   epoetin sadia-epbx  30 Units/kg SubCUTAneous Once per day on Mon Wed Fri    aspirin  81 mg Oral Daily    clopidogrel  75 mg Oral Daily    atorvastatin  40 mg Oral Daily    carvedilol  12.5 mg Oral BID WC    insulin lispro  3 Units SubCUTAneous TID WC    sodium chloride flush  5-40 mL IntraVENous 2 times per day    pantoprazole  40 mg IntraVENous Q12H    And    sodium chloride (PF)  10 mL IntraVENous Q12H    ceFAZolin 2000 mg in 20 mL SWFI IV Syringe  2,000 mg IntraVENous See Admin Instructions    ceFAZolin  3,000 mg IntraVENous See Admin Instructions       MEDS (infusions):   sodium chloride         MEDS (prn):  traMADol, sodium chloride flush, sodium chloride, ondansetron **OR** ondansetron, acetaminophen **OR** acetaminophen    DIET:    ADULT DIET; Regular; Low Potassium (Less than 3000 mg/day)      PHYSICAL EXAM:      Patient Vitals for the past 24 hrs:   BP Temp Temp src Pulse Resp SpO2   12/03/21 0945 (!) 133/57 98.6 °F (37 °C) Oral 66 18 94 %   12/02/21 1957 (!) 143/63 98.2 °F (36.8 °C) Oral 73 18 91 %   12/02/21 1730 (!) 128/56 97.8 °F (36.6 °C) Oral 72 18 94 %   12/02/21 1710 (!) 118/56 97.7 °F (36.5 °C) -- 66 16 --   12/02/21 1700 (!) 114/48 -- -- 60 -- --   12/02/21 1630 (!) 116/30 -- -- 50 -- --   12/02/21 1600 (!) 91/38 -- -- 63 -- --   12/02/21 1530 (!) 98/42 -- -- 60 -- --   12/02/21 1500 (!) 93/48 -- -- 58 -- --   12/02/21 1430 (!) 113/44 -- -- 59 -- --   12/02/21 1400 (!) 101/47 -- -- 57 -- --   12/02/21 1330 109/60 -- -- 59 -- --   12/02/21 1257 (!) 112/43 -- -- 64 -- --   12/02/21 1252 (!) 93/50 97.9 °F (36.6 °C) -- 61 16 --          Intake/Output Summary (Last 24 hours) at 12/3/2021 1230  Last data filed at 12/2/2021 1710  Gross per 24 hour   Intake 500 ml   Output 2500 ml   Net -2000 ml       Wt Readings from Last 3 Encounters:   11/30/21 252 lb 8 oz (114.5 kg)   11/24/21 247 lb 12.8 oz (112.4 kg)   09/04/21 240 lb 1.3 oz (108.9 kg)       General:  in no acute distress  HEENT: NC/AT, EOMI, sclera and conjunctiva are clear and anicteric. Mucous membranes moist.  Cardiovascular: regular rate and rhythm, no murmurs, gallops, or rubs  Respiratory:  Clear, no rales, rhochi, or wheezes  Gastrointestinal: soft, nontender, nondistended, NABS  Ext: no cyanosis, clubbing, or edema bilateral lower extremities  Neuro: awake, alert, oriented x3. Moves all 4 extremities. Cranial nerves II through XII grossly intact.   Skin: dry, no rash      DATA:      Recent Labs     12/01/21  0228 12/01/21  0858 12/02/21  0505 12/02/21  1300 12/03/21  0933   WBC 11.4  --  7.8  --  8.2   HGB 7.6*   < > 7.4* 7.2* 7.9*   HCT 24.0*   < > 23.3* 22.4* 25.2*   .1*  --  103.1*  --  103.3*    --  223  --  178    < > = values in this interval not displayed. Recent Labs     12/01/21  0228 12/01/21  1513 12/02/21  0505 12/03/21  0933     --  135 139   K 5.0   < > 5.4*  5.4* 4.1   CL 96*  --  96* 97*   CO2 25  --  22 26   BUN 32*  --  47* 26*   CREATININE 5.3*  --  7.0* 5.0*    < > = values in this interval not displayed. Iron studies:No results found for: FERRITIN  Bone disease:  Lab Results   Component Value Date    MG 2.3 12/02/2021    PHOS 8.1 (H) 12/02/2021     DIALYSIS ORDERS:    Reviewed    Assessment  1. 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 Anemia due to ESRD, as well as likely upper GI bleed, and acute blood loss  3. 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 History of hypertension, currently rather low, typically essential, typically well controlled  5. Neck pain, possibly vertebral osteomyelitis given recent bacteremia due to CLABSI    Recommendations  1. Continue IHD support for solute and volume clearance on Tuesdays Thursdays and Saturdays as per outpatient orders and dry weight    2. Continue JHONATAN: Retacrit while here  3. Would not administer IV iron while here  4. Continue other aspects of renal management  5. Follow labs, BP  6.  Continue supportive care      Nancy Cabrera MD, MD  12/3/2021

## 2021-12-03 NOTE — PROGRESS NOTES
Occupational Therapy  OT BEDSIDE TREATMENT NOTE      Date:12/3/2021  Patient Name: Jadyn Clayton  MRN: [id number]  : 1955  Room: [address] [place] [address]     Per OT Eval:      Evaluating OT: Subha Riojas OTR/L   [id number]       Referring Lance Angeline, APRN - CNP    Specific Provider Orders/Date:OT eval and treat 2021       Diagnosis:  Neck pain [M54.2]  Septicemia ([facility] [A41.9]  Sepsis ([facility] [address] [facility] [A41.9]  Gastrointestinal hemorrhage, unspecified gastrointestinal hemorrhage type [K92.2]     Pertinent Medical History: DM,  ESRD, dialysis, brain aneurysm     Precautions:  Fall Risk, alarm       Assessment of current deficits    [x]? Functional mobility            [x]?ADLs           [x]? Strength                  []?Cognition    [x]? Functional transfers          [x]? IADLs         [x]? Safety Awareness   [x]? Endurance    []? Fine Coordination              [x]? Balance      []? Vision/perception   []? Sensation      []? Gross Motor Coordination  []? ROM           []? Delirium                   []? Motor Control      OT PLAN OF CARE   OT POC based on physician orders, patient diagnosis and results of clinical assessment     Frequency/Duration  2-3 days/wk for 2 weeks PRN   Specific OT Treatment Interventions to include:   ADL retraining/adapted techniques and AE recommendations to increase functional independence within precautions                    Energy conservation techniques to improve tolerance for selfcare routine   Functional transfer/mobility training/DME recommendations for increased independence, safety and fall prevention         Patient/family education to increase safety and functional independence             Environmental modifications for safe mobility and completion of ADLs                             Therapeutic activity to improve functional performance during ADLs.                                          Therapeutic exercise to improve tolerance and functional strength for ADLs    Balance retraining/tolerance tasks for facilitation of postural control with dynamic challenges during ADLs .       Positioning to improve functional independence     Recommended Adaptive Equipment: TBD      Home Living: Pt lives with daughter, ranch with 1 step in. Bathroom setup: tub/shower with grab bar, standard-height toilet  Equipment owned: walker     Prior Level of Function: Independent  with ADLs , assist as needed  with IADLs; ambulated with walker     Pain Level: Patient reported experiencing pain/discomfort in the R side of her neck and R shoulder; but did not rate her pain. Cognition: Patient alert and oriented. Patient pleasant, cooperative, and motivated to return to home environment and PLOF. Functional Assessment:  AM-PAC Daily Activity Raw Score: 18/24    Initial Eval Status  Date: 12/1/21 Treatment Status  Date: 12/3/2021 STGs = LTGs  Time Frame: 10-14 days   Feeding Independent        Grooming Set-up     Independent    UB Dressing Min A  R UE pain    Mod I    LB Dressing Min A Patient reported that her daughter can assist with ADLs, as needed. Mod I    Bathing Min A   Mod I    Toileting Independent        Bed Mobility  Up in recliner upon entry  Not assessed.  Independent    Functional Transfers Independent  Sit-stand from chair  Sit-to-Stands: SBA - CGA from bedside chair  Mod I    Functional Mobility SBA,w/walker   Ambulating in room   No LOB or SOB   SBA - CGA (with walker) within patient's room.  Mod I  with good tolerance    Balance Sitting:     Static:  Independent    Dynamic:SBA  Standing: SBA  Sitting: Good  (seated at edge of chair)   Standing: Fair  (with walker) Independent    Activity Tolerance Fair with light activity Fair-  Patient reported experiencing lightheadedness during functional mobility.  Good  with ADL activity    Visual/  Perceptual Glasses: none by bedside           R UE ROM  Decrease ROM R UE    Tolerate UE ROM/therapeutic exercises with good tolerance to increase function for ADLS and decrease pain        Comments: RN approved patient's participation in [address] [place] [address] activities. Upon arrival, patient seated in bedside chair. At end of session, patient seated in bedside chair with call light and phone within reach and all lines and tubes intact. Treatment: OT treatment provided this date included:    Instruction/training on safety and adapted techniques for completion of ADLs.   Instruction/training on safe functional mobility/transfer techniques.   Instruction/training on potential benefits of DME use to maximize independence/safety with ADLs. Further skilled OT treatment indicated to increase patient's safety and independence with completion of ADL/IADL tasks in order to maximize patient's functional independence and quality of life. Education: Patient education provided regardin) potential benefits of using walker to prevent falls and maximize safety with ADLs in home environment, 2) potential benefits of home therapy services. Patient verbalized understanding. · Patient has made progress towards set goals. · Continue OT plan of care. Time In: 1150  Time Out: 1210  Total Treatment Time: 20 minutes      Minutes Units   Therapeutic Ex 52259     Therapeutic Activities [id number]   ADL/Self Care 48123     Orthotic Management 93767     Neuro Re-Ed 41208     Non-Billable Time N/A ---       PRAVIN Cabrera/L  License Number: [id number]

## 2021-12-03 NOTE — CARE COORDINATION
Social Work discharge planning   Sw faxed two separate signed Rxs from ID for iv atb with outpt hemo. Sw spoke to Chandni Reeves at [facility], where pt goes to outpt hemo ph [phone], and advised her of 2 seperate and different Rx for iv atb for pt's discharge today.   Electronically signed by Mynor Dixon on 12/3/2021 at 1:46 PM

## 2021-12-03 NOTE — DISCHARGE SUMMARY
[facility] Physician Discharge Summary       PCP    In 1 week      Jose Zamorano MD  [address] [facility] [phone]      Follow up in 2-3 weeks    Kb High MD  [address] [place] [address]  [address] [phone]      Follow up in 2-3 weeks      Activity level: As tolerated    Dispo: Home      Condition on discharge: Stable     Patient ID:  Christian Fernandez  [id number]  48 y.o.  1955    Admit date: 11/30/2021    Discharge date and time:  12/3/2021  12:53 PM    Admission Diagnoses: Active Problems:    MSSA bacteremia    CLABSI (central line-associated bloodstream infection)    ESRD on hemodialysis ([facility] [address]    Essential hypertension    Hyperlipidemia    Neck pain    Anemia in CKD (chronic kidney disease)  Resolved Problems:    Septicemia [facility])    Gastrointestinal hemorrhage      Discharge Diagnoses: Active Problems:    MSSA bacteremia    CLABSI (central line-associated bloodstream infection)    ESRD on hemodialysis (H[facility])    Essential hypertension    Hyperlipidemia    Neck pain    Anemia in CKD (chronic kidney disease)  Resolved Problems:    Septicemia ([facility])    Gastrointestinal hemorrhage      Consults:  IP CONSULT TO INFECTIOUS DISEASES  IP CONSULT TO GI  IP CONSULT TO NEPHROLOGY      Procedures:  EGD 12/1/21    Hospital Course:   Patient is a 73year old female with a past medical history of hypertension, hyperlipidemia and ESRD on HD. She was recently hospitalized at [facility] from 11/18-11/26 with an MSSA Bacteremia secondary to CLABSI from an infected [facility] s/p HD catheter removal.  She was discharged home with plans to continue IV Ancef with HD TTS for 4 weeks treatment. She presented to the ER on 11/30 from dialysis with complaints of neck pain and dark tarry stools. She had been having pain in hr neck since her prior inpatient stay that had never went away and only worsened and caused difficulty ambulating.   While at dialysis she was also told that her hemoglobin was low and she needed to go to the ER for evaluation. While in the ER lab work was obtained and H&H was stable compared to most recent admission. Her WBC were noted to be increased compared to a few days prior. A CT of the cervical spine was obtained and noted multilevel degenerative disc and joint disease. Given the concern for GI bleed and worsening leukocytosis she was admitted for further management. During her stay she was followed by GI, ID and Nephrology. H&H was trended and noted to be stable although slightly lower. GI evaluated and an EGD was performed on 12/1 which showed some GERD/gastritis and also some concerns for possible celiac's for which a biopsy was done. She was continued on PPI and H&H remained stable with no need for transfusion. She was continued on the IV Ancef and given the persistent pain in her neck an MRI was recommended. MRI was obtained but had to be done without contrast as patient was unable to lay long enough due to the pain. MRI showed no evidence of osteomyelitis/discitis but noted degenerative changes in multilevel moderate spinal stenosis as well as a right thyroid nodule. US of the Thyroid was obtained and recommended a repeat US in 1 year. She was started on Tramadol for her pain with improvement. During her stay PT/OT did evaluate and she had an AMPAC score of 20/24 so no additional therapy needs were identified. She was felt to be stable for discharge home on 12/3/21 with the following medications and instructions.       Discharge Exam:  Vitals:    12/03/21 0945   BP: (!) 133/57   Pulse: 66   Resp: 18   Temp: 98.6 °F (37 °C)   SpO2: 94%     General Appearance: awake and alert, oriented x 3, in no distress  Skin: warm and dry, no rashes or lesions noted  Head: normocephalic and atraumatic  Eyes: pupils equal, round, and reactive to light, conjunctivae normal  Neck: supple and non tender without mass, no lymphadenopathy noted  Pulmonary/Chest: clear throughout, respirations even and non-labored on room air  Cardiovascular: regular rate and rhythm, S1S2 present, no murmur noted, right chest wall TDC  Abdomen: obese, soft, non-tender, non-distended, normal bowel sounds  Musculoskeletal: 4/5 strength to all extremities, pain with ROM to the neck - improvement noted, no other joint swelling/tenderness noted  Extremities: 1+ edema to BLE with tubi  in place, no cyanosis or clubbing noted  Neurologic: no cranial nerve deficit and speech     I/O last 3 completed shifts: In: 500   Out: 2500   I/O this shift:  In: 360 [P.O.:360]  Out: -       LABS:  Recent Labs     12/01/21  0228 12/01/21  1513 12/02/21  0505 12/03/21  0933     --  135 139   K 5.0   < > 5.4*  5.4* 4.1   CL 96*  --  96* 97*   CO2 25  --  22 26   BUN 32*  --  47* 26*   CREATININE 5.3*  --  7.0* 5.0*   GLUCOSE 129*  --  177* 155*   CALCIUM 8.2*  --  8.0* 8.3*    < > = values in this interval not displayed. Recent Labs     12/01/21  0228 12/01/21  0858 12/02/21  0505 12/02/21  1300 12/03/21  0933   WBC 11.4  --  7.8  --  8.2   RBC 2.35*  --  2.26*  --  2.44*   HGB 7.6*   < > 7.4* 7.2* 7.9*   HCT 24.0*   < > 23.3* 22.4* 25.2*   .1*  --  103.1*  --  103.3*   MCH 32.3  --  32.7  --  32.4   MCHC 31.7*  --  31.8*  --  31.3*   RDW 14.0  --  13.8  --  13.7     --  223  --  178   MPV 10.2  --  10.1  --  10.4    < > = values in this interval not displayed. No results for input(s): POCGLU in the last 72 hours. Imaging:  CT Cervical Spine WO Contrast    Result Date: 11/30/2021  EXAMINATION: CT OF THE CERVICAL SPINE WITHOUT CONTRAST 11/30/2021 10:53 am TECHNIQUE: CT of the cervical spine was performed without the administration of intravenous contrast. Multiplanar reformatted images are provided for review.  Dose modulation, iterative reconstruction, and/or weight based adjustment of the mA/kV was utilized to reduce the radiation dose to as low as reasonably achievable. COMPARISON: None. HISTORY: ORDERING SYSTEM PROVIDED HISTORY: neck pain TECHNOLOGIST PROVIDED HISTORY: Reason for exam:->neck pain Decision Support Exception - unselect if not a suspected or confirmed emergency medical condition->Emergency Medical Condition (MA) FINDINGS: The ring of C1 is intact as is the dense. There is no compression fracture of the cervical spine. No jumped or perched facet is noted. Advanced multilevel degenerative disc and degenerative joint disease is noted. There is bilateral neural foraminal narrowing seen at the C4-5 and C5-6 levels. The prevertebral soft tissues are unremarkable. The airway is widely patent. Images through the lung apices are negative for a pneumothorax. 1. There is no acute compression fracture or subluxation of the cervical spine. 2. Advanced multilevel degenerative disc and degenerative joint disease. 3. If the patient's pain persists dedicated follow-up MRI is recommended for further evaluation. XR CHEST PORTABLE    Result Date: 11/30/2021  EXAMINATION: ONE XRAY VIEW OF THE CHEST PORTABLE SUPINE 11/30/2021 10:37 am COMPARISON: 11/24/2021 HISTORY: ORDERING SYSTEM PROVIDED HISTORY: epigastric pain TECHNOLOGIST PROVIDED HISTORY: Reason for exam:->epigastric pain FINDINGS: The right subclavian dialysis catheter appears unchanged in position. Again seen is short length mild kinking of the catheter at the level of the right 1st rib and the degree of catheter narrowing appears less conspicuous than prior. Otherwise, no significant change. Cardiomegaly and mild pulmonary venous congestion. No acute pulmonary infiltrate or pleural effusion. No pneumothorax. 1.  Cardiomegaly and mild pulmonary venous congestion, unchanged. No pneumonia identified. 2.  Right subclavian dialysis catheter with kinking, as above.        Patient Instructions:      Medication List      START taking these medications    pantoprazole 40 MG tablet  Commonly known as: PROTONIX  Take 1 tablet by mouth every morning (before breakfast)     traMADol 50 MG tablet  Commonly known as: ULTRAM  Take 1 tablet by mouth every 6 hours as needed for Pain for up to 7 days. CHANGE how you take these medications    * ceFAZolin  infusion  Commonly known as: ANCEF  Infuse 3,000 mg intravenously once a week Compound per protocol. Every Saturday after HD. Stop date 1/1/22. What changed: additional instructions     * ceFAZolin  infusion  Commonly known as: ANCEF  Infuse 2,000 mg intravenously Twice a Week for 28 days Compound per protocol. Tuesday and Thursday after HD. Stop date 1/1/2022  Start taking on: December 6, 2021  What changed: additional instructions         * This list has 2 medication(s) that are the same as other medications prescribed for you. Read the directions carefully, and ask your doctor or other care provider to review them with you. CONTINUE taking these medications    aspirin 81 MG EC tablet     atorvastatin 40 MG tablet  Commonly known as: LIPITOR     carvedilol 12.5 MG tablet  Commonly known as: COREG     clopidogrel 75 MG tablet  Commonly known as: PLAVIX     NovoLOG 100 UNIT/ML injection vial  Generic drug: insulin aspart     Toujeo Max SoloStar 300 UNIT/ML Sopn  Generic drug: Insulin Glargine (2 Unit Dial)           Where to Get Your Medications      These medications were sent to [address] [place] [address]    Phone: [phone]   · pantoprazole 40 MG tablet     You can get these medications from any pharmacy    Bring a paper prescription for each of these medications  · ceFAZolin  infusion  · ceFAZolin  infusion  · traMADol 50 MG tablet           Note that more than 30 minutes was spent in preparing discharge papers, discussing discharge with patient, medication review, etc.    Signed:  Electronically signed by Scott Matthews.  Maurice Escudero CNP

## 2021-12-03 NOTE — PROGRESS NOTES
[address] [place] [address] [facility]  MAURICIOIDA  Progress Note    SUBJECTIVE:  Chief Complaint   Patient presents with    Rectal Bleeding     pt arrives with ems from dialysis. report of black tarry stools this am. pt had low hgb reported at dialysis 7ish.  Nausea     nausea reported since this am with dry heaves no emesis.  Neck Pain     neck pain since pt discharge from [facility] main friday. 9/10 sharp pain. Sitting up in the chair, says she is tired  Daughter is present. In no distress   for discharge today     Review of systems:  As stated above in the chief complaint, otherwise negative. Medications:  Scheduled Meds:   epoetin sadia-epbx  30 Units/kg SubCUTAneous Once per day on     aspirin  81 mg Oral Daily    clopidogrel  75 mg Oral Daily    atorvastatin  40 mg Oral Daily    carvedilol  12.5 mg Oral BID WC    insulin lispro  3 Units SubCUTAneous TID WC    sodium chloride flush  5-40 mL IntraVENous 2 times per day    pantoprazole  40 mg IntraVENous Q12H    And    sodium chloride (PF)  10 mL IntraVENous Q12H    ceFAZolin 2000 mg in 20 mL SWFI IV Syringe  2,000 mg IntraVENous See Admin Instructions    ceFAZolin  3,000 mg IntraVENous See Admin Instructions     Continuous Infusions:   sodium chloride       PRN Meds:traMADol, sodium chloride flush, sodium chloride, ondansetron **OR** ondansetron, acetaminophen **OR** acetaminophen    OBJECTIVE:  BP (!) 133/57   Pulse 66   Temp 98.6 °F (37 °C) (Oral)   Resp 18   Ht 5' 4\" (1.626 m)   Wt 252 lb 8 oz (114.5 kg)   SpO2 94%   BMI 43.34 kg/m²   Temp  Av °F (36.7 °C)  Min: 97.7 °F (36.5 °C)  Max: 98.6 °F (37 °C)  Constitutional: The patient is awake, alert, and oriented. Sitting up in the chair in no distress, daughter is present  Skin: Warm and dry. No rashes were noted. HEENT: Round and reactive pupils. Moist mucous membranes. No ulcerations or thrush. Neck: Supple to movements.  +pain, heatng pad on  Chest: No respiratory distress. Symmetrical expansion. No wheezing, crackles or rhonchi. Cardiovascular: S1 and S2 are rhythmic and regular. No murmurs appreciated. Abdomen: Positive bowel sounds to auscultation. Benign to palpation. No masses felt. Extremities: No edema. Lines: peripheral  Right chest HD catheter     Laboratory and Tests Review:  Lab Results   Component Value Date    WBC 8.2 12/03/2021    WBC 7.8 12/02/2021    WBC 11.4 12/01/2021    HGB 7.9 (L) 12/03/2021    HCT 25.2 (L) 12/03/2021    .3 (H) 12/03/2021     12/03/2021     Lab Results   Component Value Date    NEUTROABS 6.09 12/03/2021    NEUTROABS 5.12 12/02/2021    NEUTROABS 8.60 (H) 12/01/2021     No results found for: CRPHS  Lab Results   Component Value Date    ALT <5 12/03/2021    AST 12 12/03/2021    ALKPHOS 112 (H) 12/03/2021    BILITOT 0.2 12/03/2021     Lab Results   Component Value Date     12/03/2021    K 4.1 12/03/2021    CL 97 12/03/2021    CO2 26 12/03/2021    BUN 26 12/03/2021    CREATININE 5.0 12/03/2021    CREATININE 7.0 12/02/2021    CREATININE 5.3 12/01/2021    GFRAA 10 12/03/2021    LABGLOM 9 12/03/2021    GLUCOSE 155 12/03/2021    PROT 7.4 12/03/2021    LABALBU 3.4 12/03/2021    CALCIUM 8.3 12/03/2021    BILITOT 0.2 12/03/2021    ALKPHOS 112 12/03/2021    AST 12 12/03/2021    ALT <5 12/03/2021     No results found for: CRP  Lab Results   Component Value Date    SEDRATE 103 (H) 11/21/2021     Radiology:  MRI reviewed    Microbiology:   Blood cultures: negative so far     ASSESSMENT:  · CLABSI secondary to tunneled HD catheter infection. Status post removal and replacement  · MSSA bacteremia associated to CLABSI  · Neck pain.   Possible vertebral osteomyelitis versus epidural abscess  · Probable GI bleed  · Leukocytosis associated with GI bleed, improved     PLAN:  · Continue Cefazolin 2-2-3 with HD until at least 1/1/2022  · The patient can be discharged from ID standpoint  · Med rec complete Karyle Manos,

## 2021-12-05 LAB
BLOOD CULTURE, ROUTINE: NORMAL
CULTURE, BLOOD 2: NORMAL

## 2021-12-06 NOTE — PROGRESS NOTES
CLINICAL PHARMACY NOTE: MEDS TO BEDS    Total # of Prescriptions Filled: 1   The following medications were delivered to the patient:  · Pantoprazole 40 mg  ·     Additional Documentation:

## 2021-12-15 ENCOUNTER — TELEPHONE (OUTPATIENT)
Dept: VASCULAR SURGERY | Age: 66
End: 2021-12-15

## 2021-12-15 NOTE — TELEPHONE ENCOUNTER
Left message for patient to call office to schedule revision AVF (L). Does not need to be seen in office prior to surgery.

## 2021-12-20 ENCOUNTER — TELEPHONE (OUTPATIENT)
Dept: VASCULAR SURGERY | Age: 66
End: 2021-12-20

## 2021-12-28 ENCOUNTER — TELEPHONE (OUTPATIENT)
Dept: VASCULAR SURGERY | Age: 66
End: 2021-12-28

## 2021-12-28 NOTE — TELEPHONE ENCOUNTER
Call received from pt's dialysis nurse, Pawan Wellington. Pt is receiving IV abx for CLABSI with the last dose on 1/2/22; blood cultures will be redrawn the following week. Spoke with the pt, postponed AVF revision to 1/28/22 pending those results.

## 2022-01-07 ENCOUNTER — OFFICE VISIT (OUTPATIENT)
Dept: FAMILY MEDICINE CLINIC | Age: 67
End: 2022-01-07
Payer: MEDICARE

## 2022-01-07 VITALS
OXYGEN SATURATION: 96 % | WEIGHT: 256 LBS | DIASTOLIC BLOOD PRESSURE: 68 MMHG | SYSTOLIC BLOOD PRESSURE: 132 MMHG | BODY MASS INDEX: 43.94 KG/M2 | TEMPERATURE: 97.4 F | HEART RATE: 76 BPM

## 2022-01-07 DIAGNOSIS — U07.1 COVID-19: Primary | ICD-10-CM

## 2022-01-07 LAB
Lab: ABNORMAL
QC PASS/FAIL: ABNORMAL
SARS-COV-2 RDRP RESP QL NAA+PROBE: POSITIVE
SARS-COV-2: DETECTED

## 2022-01-07 PROCEDURE — 3017F COLORECTAL CA SCREEN DOC REV: CPT | Performed by: FAMILY MEDICINE

## 2022-01-07 PROCEDURE — 87635 SARS-COV-2 COVID-19 AMP PRB: CPT | Performed by: FAMILY MEDICINE

## 2022-01-07 PROCEDURE — 1036F TOBACCO NON-USER: CPT | Performed by: FAMILY MEDICINE

## 2022-01-07 PROCEDURE — 1123F ACP DISCUSS/DSCN MKR DOCD: CPT | Performed by: FAMILY MEDICINE

## 2022-01-07 PROCEDURE — G8400 PT W/DXA NO RESULTS DOC: HCPCS | Performed by: FAMILY MEDICINE

## 2022-01-07 PROCEDURE — 99203 OFFICE O/P NEW LOW 30 MIN: CPT | Performed by: FAMILY MEDICINE

## 2022-01-07 PROCEDURE — 4040F PNEUMOC VAC/ADMIN/RCVD: CPT | Performed by: FAMILY MEDICINE

## 2022-01-07 PROCEDURE — G8417 CALC BMI ABV UP PARAM F/U: HCPCS | Performed by: FAMILY MEDICINE

## 2022-01-07 PROCEDURE — G8484 FLU IMMUNIZE NO ADMIN: HCPCS | Performed by: FAMILY MEDICINE

## 2022-01-07 PROCEDURE — 1090F PRES/ABSN URINE INCON ASSESS: CPT | Performed by: FAMILY MEDICINE

## 2022-01-07 PROCEDURE — G8427 DOCREV CUR MEDS BY ELIG CLIN: HCPCS | Performed by: FAMILY MEDICINE

## 2022-01-07 ASSESSMENT — ENCOUNTER SYMPTOMS
SHORTNESS OF BREATH: 0
NAUSEA: 1
CONSTIPATION: 0
VOMITING: 0
COUGH: 1
RHINORRHEA: 1
SINUS PRESSURE: 1
DIARRHEA: 1
SORE THROAT: 1
SINUS PAIN: 1
WHEEZING: 0

## 2022-01-07 NOTE — PROGRESS NOTES
2022    Cathleen Damon is a 73 y.o. female here for:    Chief Complaint   Patient presents with    Concern For COVID-19     + home test/pt on dialysis/would like antibody     Congestion    Pharyngitis    Generalized Body Aches        HPI:  Cough, sore throat, postnasal drip, headache, body aches  - Started 2 days ago   - Had a home COVID-19 test that was positive yesterday   - Denies fevers, shortness of breath, and wheezing   - Admits to associated chills, diarrhea, and nausea   - PMHx ESRD on dialysis, T2DM, CAD, HTN  - Vaccinated against COVID-19 with Pfizer x 2   - Vaccinated against flu vaccine this year   - Has not tried anything for her symptoms       Current Outpatient Medications   Medication Sig Dispense Refill    pantoprazole (PROTONIX) 40 MG tablet Take 1 tablet by mouth every morning (before breakfast) 30 tablet 0    atorvastatin (LIPITOR) 40 MG tablet Take 40 mg by mouth daily      carvedilol (COREG) 12.5 MG tablet Take 12.5 mg by mouth 2 times daily (with meals)      clopidogrel (PLAVIX) 75 MG tablet Take 75 mg by mouth daily      aspirin 81 MG EC tablet Take 81 mg by mouth daily      Insulin Glargine, 2 Unit Dial, (TOUJEO MAX SOLOSTAR) 300 UNIT/ML SOPN Inject 25 Units into the skin daily      insulin aspart (NOVOLOG) 100 UNIT/ML injection vial Inject 3 Units into the skin 3 times daily (before meals)       No current facility-administered medications for this visit.       Allergies   Allergen Reactions    Morphine Itching    Other      bleach    Pcn [Penicillins] Rash       Past Medical & Surgical History:      Diagnosis Date    Brain aneurysm     CLABSI (central line-associated bloodstream infection) 2021    Diabetes mellitus ([facility] [address]     ESRD on hemodialysis ([facility] [address] 2021    H/O heart artery stent     Hyperlipidemia     Hypertension      Past Surgical History:   Procedure Laterality Date    CARDIAC SURGERY       SECTION      UPPER GASTROINTESTINAL ENDOSCOPY N/A 2021    EGD BIOPSY performed by Jo Sawyer MD at [address] N/A 2021    CATHETER INSERTION  TUNNELED HEMODIALYSIS, WITH REMOVAL OF TEMPORARY CATHETER performed by Michael Concepcion MD at [facility] OR       Family History:      Problem Relation Age of Onset    Coronary Art Dis Mother     Coronary Art Dis Father     Coronary Art Dis Brother        Social History:  Social History     Tobacco Use    Smoking status: Former Smoker     Packs/day: 1.00     Quit date: 2017     Years since quittin.1    Smokeless tobacco: Never Used   Substance Use Topics    Alcohol use: Never       Review of Systems   Constitutional: Positive for chills. Negative for fever. HENT: Positive for rhinorrhea, sinus pressure, sinus pain and sore throat. Negative for congestion and ear pain. Respiratory: Positive for cough. Negative for shortness of breath and wheezing. Cardiovascular: Positive for palpitations. Negative for chest pain. Gastrointestinal: Positive for diarrhea and nausea. Negative for constipation and vomiting. BP Readings from Last 3 Encounters:   22 132/68   21 (!) 133/57   21 (!) 107/52       VS:  /68   Pulse 76   Temp 97.4 °F (36.3 °C)   Wt 256 lb (116.1 kg)   SpO2 96%   BMI 43.94 kg/m²     Physical Exam  Vitals reviewed. Constitutional:       General: She is awake. She is not in acute distress. Appearance: She is well-developed and well-groomed. She is morbidly obese. HENT:      Head: Normocephalic and atraumatic. Right Ear: Ear canal and external ear normal. Tympanic membrane is not erythematous or bulging. Left Ear: Ear canal and external ear normal. Tympanic membrane is not erythematous or bulging. Nose: No congestion or rhinorrhea. Right Turbinates: Not swollen. Left Turbinates: Not swollen. Mouth/Throat:      Lips: Pink.       Mouth: Mucous membranes are moist. Pharynx: Uvula midline. No pharyngeal swelling, oropharyngeal exudate, posterior oropharyngeal erythema or uvula swelling. Cardiovascular:      Rate and Rhythm: Normal rate and regular rhythm. Heart sounds: S1 normal and S2 normal. Murmur heard. Pulmonary:      Breath sounds: No decreased breath sounds, wheezing, rhonchi or rales. Neurological:      General: No focal deficit present. Mental Status: She is alert. Psychiatric:         Attention and Perception: Attention normal.         Mood and Affect: Mood normal.         Speech: Speech normal.         Behavior: Behavior normal. Behavior is cooperative. Thought Content: Thought content normal.         Cognition and Memory: Cognition normal.         Judgment: Judgment normal.         Assessment/Plan:  1. COVID-19  Positive COVID-19 test today. Symptomatic treatment discussed with patient. COVID-19 antibody therapy ordered. - POCT COVID-19, Rapid        Return if symptoms worsen or fail to improve.       Ray Peña, DO  Family Medicine

## 2022-01-11 ENCOUNTER — APPOINTMENT (OUTPATIENT)
Dept: CT IMAGING | Age: 67
DRG: 177 | End: 2022-01-11
Payer: MEDICARE

## 2022-01-11 ENCOUNTER — HOSPITAL ENCOUNTER (INPATIENT)
Age: 67
LOS: 3 days | Discharge: HOME | DRG: 177 | End: 2022-01-14
Attending: EMERGENCY MEDICINE | Admitting: FAMILY MEDICINE
Payer: MEDICARE

## 2022-01-11 ENCOUNTER — APPOINTMENT (OUTPATIENT)
Dept: GENERAL RADIOLOGY | Age: 67
DRG: 177 | End: 2022-01-11
Payer: MEDICARE

## 2022-01-11 DIAGNOSIS — U07.1 PNEUMONIA DUE TO COVID-19 VIRUS: ICD-10-CM

## 2022-01-11 DIAGNOSIS — R19.7 DIARRHEA, UNSPECIFIED TYPE: ICD-10-CM

## 2022-01-11 DIAGNOSIS — J12.82 PNEUMONIA DUE TO COVID-19 VIRUS: ICD-10-CM

## 2022-01-11 DIAGNOSIS — R10.33 PERIUMBILICAL ABDOMINAL PAIN: Primary | ICD-10-CM

## 2022-01-11 DIAGNOSIS — Z99.2 STAGE 5 CHRONIC KIDNEY DISEASE ON CHRONIC DIALYSIS (HCC): ICD-10-CM

## 2022-01-11 DIAGNOSIS — N18.6 STAGE 5 CHRONIC KIDNEY DISEASE ON CHRONIC DIALYSIS (HCC): ICD-10-CM

## 2022-01-11 LAB
ALBUMIN SERPL-MCNC: 3.8 G/DL (ref 3.5–5.2)
ALP BLD-CCNC: 82 U/L (ref 35–104)
ALT SERPL-CCNC: <5 U/L (ref 0–32)
ANION GAP SERPL CALCULATED.3IONS-SCNC: 23 MMOL/L (ref 7–16)
AST SERPL-CCNC: 20 U/L (ref 0–31)
BASOPHILS ABSOLUTE: 0.01 E9/L (ref 0–0.2)
BASOPHILS RELATIVE PERCENT: 0.2 % (ref 0–2)
BETA-HYDROXYBUTYRATE: 0.46 MMOL/L (ref 0.02–0.27)
BILIRUB SERPL-MCNC: 0.3 MG/DL (ref 0–1.2)
BUN BLDV-MCNC: 66 MG/DL (ref 6–23)
C-REACTIVE PROTEIN: 5.2 MG/DL (ref 0–0.4)
CALCIUM SERPL-MCNC: 7.4 MG/DL (ref 8.6–10.2)
CHLORIDE BLD-SCNC: 92 MMOL/L (ref 98–107)
CHP ED QC CHECK: NORMAL
CO2: 18 MMOL/L (ref 22–29)
CREAT SERPL-MCNC: 8.4 MG/DL (ref 0.5–1)
D DIMER: 1893 NG/ML DDU
EOSINOPHILS ABSOLUTE: 0 E9/L (ref 0.05–0.5)
EOSINOPHILS RELATIVE PERCENT: 0 % (ref 0–6)
GFR AFRICAN AMERICAN: 6
GFR NON-AFRICAN AMERICAN: 5 ML/MIN/1.73
GLUCOSE BLD-MCNC: 145 MG/DL
GLUCOSE BLD-MCNC: 145 MG/DL (ref 74–99)
HCT VFR BLD CALC: 31.9 % (ref 34–48)
HEMOGLOBIN: 9.9 G/DL (ref 11.5–15.5)
IMMATURE GRANULOCYTES #: 0.04 E9/L
IMMATURE GRANULOCYTES %: 0.9 % (ref 0–5)
LACTATE DEHYDROGENASE: 379 U/L (ref 135–214)
LYMPHOCYTES ABSOLUTE: 0.71 E9/L (ref 1.5–4)
LYMPHOCYTES RELATIVE PERCENT: 15.7 % (ref 20–42)
MCH RBC QN AUTO: 31.6 PG (ref 26–35)
MCHC RBC AUTO-ENTMCNC: 31 % (ref 32–34.5)
MCV RBC AUTO: 101.9 FL (ref 80–99.9)
MONOCYTES ABSOLUTE: 0.47 E9/L (ref 0.1–0.95)
MONOCYTES RELATIVE PERCENT: 10.4 % (ref 2–12)
NEUTROPHILS ABSOLUTE: 3.28 E9/L (ref 1.8–7.3)
NEUTROPHILS RELATIVE PERCENT: 72.8 % (ref 43–80)
PDW BLD-RTO: 14.5 FL (ref 11.5–15)
PH VENOUS: 7.35 (ref 7.35–7.45)
PLATELET # BLD: 147 E9/L (ref 130–450)
PMV BLD AUTO: 10.4 FL (ref 7–12)
POTASSIUM REFLEX MAGNESIUM: 5 MMOL/L (ref 3.5–5)
PRO-BNP: ABNORMAL PG/ML (ref 0–125)
PROCALCITONIN: 2.45 NG/ML (ref 0–0.08)
RBC # BLD: 3.13 E12/L (ref 3.5–5.5)
SODIUM BLD-SCNC: 133 MMOL/L (ref 132–146)
T4 FREE: 1.1 NG/DL (ref 0.93–1.7)
TOTAL PROTEIN: 7.5 G/DL (ref 6.4–8.3)
TROPONIN, HIGH SENSITIVITY: 105 NG/L (ref 0–9)
TROPONIN, HIGH SENSITIVITY: 94 NG/L (ref 0–9)
WBC # BLD: 4.5 E9/L (ref 4.5–11.5)

## 2022-01-11 PROCEDURE — 85025 COMPLETE CBC W/AUTO DIFF WBC: CPT

## 2022-01-11 PROCEDURE — 83615 LACTATE (LD) (LDH) ENZYME: CPT

## 2022-01-11 PROCEDURE — 84145 PROCALCITONIN (PCT): CPT

## 2022-01-11 PROCEDURE — 71045 X-RAY EXAM CHEST 1 VIEW: CPT

## 2022-01-11 PROCEDURE — 6360000002 HC RX W HCPCS

## 2022-01-11 PROCEDURE — 83880 ASSAY OF NATRIURETIC PEPTIDE: CPT

## 2022-01-11 PROCEDURE — 86140 C-REACTIVE PROTEIN: CPT

## 2022-01-11 PROCEDURE — 99284 EMERGENCY DEPT VISIT MOD MDM: CPT

## 2022-01-11 PROCEDURE — 6360000004 HC RX CONTRAST MEDICATION: Performed by: RADIOLOGY

## 2022-01-11 PROCEDURE — 85378 FIBRIN DEGRADE SEMIQUANT: CPT

## 2022-01-11 PROCEDURE — 82800 BLOOD PH: CPT

## 2022-01-11 PROCEDURE — 1200000000 HC SEMI PRIVATE

## 2022-01-11 PROCEDURE — 93005 ELECTROCARDIOGRAM TRACING: CPT

## 2022-01-11 PROCEDURE — 84484 ASSAY OF TROPONIN QUANT: CPT

## 2022-01-11 PROCEDURE — 99223 1ST HOSP IP/OBS HIGH 75: CPT | Performed by: INTERNAL MEDICINE

## 2022-01-11 PROCEDURE — 96374 THER/PROPH/DIAG INJ IV PUSH: CPT

## 2022-01-11 PROCEDURE — 84439 ASSAY OF FREE THYROXINE: CPT

## 2022-01-11 PROCEDURE — 71275 CT ANGIOGRAPHY CHEST: CPT

## 2022-01-11 PROCEDURE — 6370000000 HC RX 637 (ALT 250 FOR IP): Performed by: NURSE PRACTITIONER

## 2022-01-11 PROCEDURE — 80053 COMPREHEN METABOLIC PANEL: CPT

## 2022-01-11 PROCEDURE — 2580000003 HC RX 258

## 2022-01-11 PROCEDURE — 6360000002 HC RX W HCPCS: Performed by: NURSE PRACTITIONER

## 2022-01-11 PROCEDURE — 2580000003 HC RX 258: Performed by: NURSE PRACTITIONER

## 2022-01-11 PROCEDURE — 82010 KETONE BODYS QUAN: CPT

## 2022-01-11 RX ORDER — VITAMIN B COMPLEX
6000 TABLET ORAL DAILY
Status: DISCONTINUED | OUTPATIENT
Start: 2022-01-11 | End: 2022-01-14 | Stop reason: HOSPADM

## 2022-01-11 RX ORDER — ATORVASTATIN CALCIUM 40 MG/1
40 TABLET, FILM COATED ORAL DAILY
Status: DISCONTINUED | OUTPATIENT
Start: 2022-01-11 | End: 2022-01-14 | Stop reason: HOSPADM

## 2022-01-11 RX ORDER — GUAIFENESIN/DEXTROMETHORPHAN 100-10MG/5
5 SYRUP ORAL EVERY 4 HOURS PRN
Status: DISCONTINUED | OUTPATIENT
Start: 2022-01-11 | End: 2022-01-14 | Stop reason: HOSPADM

## 2022-01-11 RX ORDER — SODIUM CHLORIDE 9 MG/ML
25 INJECTION, SOLUTION INTRAVENOUS PRN
Status: DISCONTINUED | OUTPATIENT
Start: 2022-01-11 | End: 2022-01-14 | Stop reason: HOSPADM

## 2022-01-11 RX ORDER — SODIUM CHLORIDE 0.9 % (FLUSH) 0.9 %
5-40 SYRINGE (ML) INJECTION PRN
Status: DISCONTINUED | OUTPATIENT
Start: 2022-01-11 | End: 2022-01-14 | Stop reason: HOSPADM

## 2022-01-11 RX ORDER — 0.9 % SODIUM CHLORIDE 0.9 %
30 INTRAVENOUS SOLUTION INTRAVENOUS PRN
Status: DISCONTINUED | OUTPATIENT
Start: 2022-01-11 | End: 2022-01-14 | Stop reason: HOSPADM

## 2022-01-11 RX ORDER — 0.9 % SODIUM CHLORIDE 0.9 %
500 INTRAVENOUS SOLUTION INTRAVENOUS ONCE
Status: COMPLETED | OUTPATIENT
Start: 2022-01-11 | End: 2022-01-12

## 2022-01-11 RX ORDER — HEPARIN SODIUM 10000 [USP'U]/ML
5000 INJECTION, SOLUTION INTRAVENOUS; SUBCUTANEOUS EVERY 8 HOURS SCHEDULED
Status: DISCONTINUED | OUTPATIENT
Start: 2022-01-11 | End: 2022-01-14 | Stop reason: HOSPADM

## 2022-01-11 RX ORDER — SODIUM CHLORIDE 0.9 % (FLUSH) 0.9 %
5-40 SYRINGE (ML) INJECTION EVERY 12 HOURS SCHEDULED
Status: DISCONTINUED | OUTPATIENT
Start: 2022-01-11 | End: 2022-01-14 | Stop reason: HOSPADM

## 2022-01-11 RX ORDER — ONDANSETRON 2 MG/ML
4 INJECTION INTRAMUSCULAR; INTRAVENOUS EVERY 6 HOURS PRN
Status: DISCONTINUED | OUTPATIENT
Start: 2022-01-11 | End: 2022-01-14 | Stop reason: HOSPADM

## 2022-01-11 RX ORDER — CARVEDILOL 6.25 MG/1
12.5 TABLET ORAL 2 TIMES DAILY WITH MEALS
Status: DISCONTINUED | OUTPATIENT
Start: 2022-01-11 | End: 2022-01-14 | Stop reason: HOSPADM

## 2022-01-11 RX ORDER — ACETAMINOPHEN 650 MG/1
650 SUPPOSITORY RECTAL EVERY 6 HOURS PRN
Status: DISCONTINUED | OUTPATIENT
Start: 2022-01-11 | End: 2022-01-14 | Stop reason: HOSPADM

## 2022-01-11 RX ORDER — INSULIN GLARGINE 100 [IU]/ML
25 INJECTION, SOLUTION SUBCUTANEOUS DAILY
Status: DISCONTINUED | OUTPATIENT
Start: 2022-01-11 | End: 2022-01-13

## 2022-01-11 RX ORDER — ACETAMINOPHEN 325 MG/1
650 TABLET ORAL EVERY 6 HOURS PRN
Status: DISCONTINUED | OUTPATIENT
Start: 2022-01-11 | End: 2022-01-14 | Stop reason: HOSPADM

## 2022-01-11 RX ORDER — ASCORBIC ACID 500 MG
500 TABLET ORAL 3 TIMES DAILY
Status: DISCONTINUED | OUTPATIENT
Start: 2022-01-11 | End: 2022-01-14 | Stop reason: HOSPADM

## 2022-01-11 RX ORDER — DEXAMETHASONE SODIUM PHOSPHATE 10 MG/ML
10 INJECTION INTRAMUSCULAR; INTRAVENOUS ONCE
Status: COMPLETED | OUTPATIENT
Start: 2022-01-11 | End: 2022-01-11

## 2022-01-11 RX ORDER — ONDANSETRON 4 MG/1
4 TABLET, ORALLY DISINTEGRATING ORAL EVERY 8 HOURS PRN
Status: DISCONTINUED | OUTPATIENT
Start: 2022-01-11 | End: 2022-01-14 | Stop reason: HOSPADM

## 2022-01-11 RX ORDER — CLOPIDOGREL BISULFATE 75 MG/1
75 TABLET ORAL DAILY
Status: DISCONTINUED | OUTPATIENT
Start: 2022-01-11 | End: 2022-01-14 | Stop reason: HOSPADM

## 2022-01-11 RX ORDER — VITAMIN B COMPLEX
2000 TABLET ORAL DAILY
Status: DISCONTINUED | OUTPATIENT
Start: 2022-01-18 | End: 2022-01-14 | Stop reason: HOSPADM

## 2022-01-11 RX ORDER — ASPIRIN 81 MG/1
81 TABLET ORAL DAILY
Status: DISCONTINUED | OUTPATIENT
Start: 2022-01-11 | End: 2022-01-14 | Stop reason: HOSPADM

## 2022-01-11 RX ORDER — PANTOPRAZOLE SODIUM 40 MG/1
40 TABLET, DELAYED RELEASE ORAL
Status: DISCONTINUED | OUTPATIENT
Start: 2022-01-12 | End: 2022-01-14 | Stop reason: HOSPADM

## 2022-01-11 RX ORDER — DEXAMETHASONE 4 MG/1
6 TABLET ORAL DAILY
Status: DISCONTINUED | OUTPATIENT
Start: 2022-01-12 | End: 2022-01-14 | Stop reason: HOSPADM

## 2022-01-11 RX ORDER — ZINC SULFATE 50(220)MG
50 CAPSULE ORAL DAILY
Status: DISCONTINUED | OUTPATIENT
Start: 2022-01-11 | End: 2022-01-14 | Stop reason: HOSPADM

## 2022-01-11 RX ADMIN — IOPAMIDOL 75 ML: 755 INJECTION, SOLUTION INTRAVENOUS at 17:43

## 2022-01-11 RX ADMIN — DEXAMETHASONE SODIUM PHOSPHATE 10 MG: 10 INJECTION INTRAMUSCULAR; INTRAVENOUS at 15:16

## 2022-01-11 RX ADMIN — OXYCODONE HYDROCHLORIDE AND ACETAMINOPHEN 500 MG: 500 TABLET ORAL at 20:24

## 2022-01-11 RX ADMIN — Medication 10 ML: at 20:34

## 2022-01-11 RX ADMIN — SODIUM CHLORIDE 500 ML: 9 INJECTION, SOLUTION INTRAVENOUS at 20:10

## 2022-01-11 RX ADMIN — AZITHROMYCIN DIHYDRATE 500 MG: 500 INJECTION, POWDER, LYOPHILIZED, FOR SOLUTION INTRAVENOUS at 20:27

## 2022-01-11 RX ADMIN — ONDANSETRON 4 MG: 4 TABLET, ORALLY DISINTEGRATING ORAL at 20:36

## 2022-01-11 RX ADMIN — SODIUM CHLORIDE, PRESERVATIVE FREE 1000 MG: 5 INJECTION INTRAVENOUS at 20:24

## 2022-01-11 ASSESSMENT — ENCOUNTER SYMPTOMS
CONSTIPATION: 0
ALLERGIC/IMMUNOLOGIC NEGATIVE: 1
SHORTNESS OF BREATH: 1
ABDOMINAL PAIN: 1
COUGH: 1
BACK PAIN: 0
WHEEZING: 0
COUGH: 0
DIARRHEA: 1
NAUSEA: 1
BLOOD IN STOOL: 0
EYES NEGATIVE: 1
CHEST TIGHTNESS: 0

## 2022-01-11 NOTE — ED PROVIDER NOTES
[address]      Pt Name: Ana Huerta  MRN: [id number]  [place] 1955  Date of evaluation: 1/11/2022      CHIEF COMPLAINT       Chief Complaint   Patient presents with    Positive For Covid-19     tested 4 days ago dialysis pt due for dialysis today    Diarrhea     3 times since 0001     Nausea     dry heaves no vomiting        HPI  Ana Huerta is a 73 y.o. female with medical history of insulin-dependent type 2 diabetes, CKD (on dialysis Tuesdays Thursdays and Saturdays), cardiac stents (on plavix) presents with shortness of breath, nausea, diarrhea, body aches for the past week. Patient states that she tested positive for COVID on Thursday, and has been quarantining at home. Patient started having nonbloody diarrhea for the past couple days, constant, worsening, she was due to get dialysis today but could not make it due to diarrhea. She has been taking Imodium with no improvement of her diarrhea. Associated with abdominal pain, rated 6 out of 10, dull, around periumbilical region, nonradiating,, worsened with dry heaving and no alleviating factors. Patient also endorses some nausea but denies any vomiting. States that her shortness of breath is worse with exertion and improved with rest, denies any fevers, chills, cough. He describes his symptoms as worsening, moderate in severity alleviating exacerbating factors. nies any headache, dizziness, weakness, cp, palpitations, cough,  dysuria, hematuria, constipation. Patient still makes urine. Except as noted above the remainder of the review of systems was reviewed and negative. Review of Systems   Constitutional: Positive for activity change, appetite change and fatigue. Negative for chills and fever. HENT: Negative for congestion, ear discharge, nosebleeds and tinnitus. Eyes: Negative for visual disturbance.    Respiratory: Positive for shortness of breath. Negative for cough, chest tightness and wheezing. Cardiovascular: Negative for chest pain, palpitations and leg swelling. Gastrointestinal: Positive for abdominal pain, diarrhea and nausea. Negative for blood in stool and constipation. Endocrine: Negative for polydipsia and polyphagia. Genitourinary: Negative for dysuria, flank pain, hematuria, vaginal bleeding, vaginal discharge and vaginal pain. Musculoskeletal: Negative for back pain, gait problem, joint swelling and myalgias. Skin: Negative for rash. Allergic/Immunologic: Negative for immunocompromised state. Neurological: Negative for dizziness, syncope, weakness, numbness and headaches. Hematological: Negative for adenopathy. Psychiatric/Behavioral: Negative for behavioral problems, confusion and hallucinations. Physical Exam  Constitutional:       General: She is not in acute distress. Appearance: Normal appearance. She is obese. She is not ill-appearing, toxic-appearing or diaphoretic. HENT:      Head: Normocephalic and atraumatic. Right Ear: External ear normal.      Left Ear: External ear normal.      Nose: Nose normal. No congestion or rhinorrhea. Mouth/Throat:      Mouth: Mucous membranes are moist.      Pharynx: Oropharynx is clear. No oropharyngeal exudate or posterior oropharyngeal erythema. Eyes:      Conjunctiva/sclera: Conjunctivae normal.      Pupils: Pupils are equal, round, and reactive to light. Cardiovascular:      Rate and Rhythm: Normal rate and regular rhythm. Pulses: Normal pulses. Heart sounds: Normal heart sounds. Pulmonary:      Effort: Pulmonary effort is normal.      Breath sounds: Normal breath sounds. No wheezing, rhonchi or rales. Chest:      Chest wall: No tenderness. Abdominal:      General: Abdomen is flat. Bowel sounds are normal. There is no distension. Palpations: Abdomen is soft. There is no mass. Tenderness:  There is abdominal COVID-pneumonia. Elevated troponin with delta Trop trending down. EKG normal sinus with occasional PVCs but no signs of acute ischemia. Patient became hypoxic while sitting and answering questions, she desaturated down to 86% she was placed on 2 L with improvement of oxygen saturation. Patient was given Decadron. She states that her nausea had improved. Admitted to the medicine team who requested 500 mL of fluid due to elevated beta hydroxyl. Consult placed to nephrology for hemodialysis. Patient currently receiving fluids, she is in agreement with plan of admission. ED Course as of 226   e 2022   1309 Patient became hypoxic while sitting at rest dropped to 88% and 86%, placed on 2L oxygen with improvement of oxygen saturation   [TC]      ED Course User Index  [TC] Svetlana Jauregui MD     --------------------------------------------- PAST HISTORY ---------------------------------------------  Past Medical History:  has a past medical history of Brain aneurysm, CLABSI (central line-associated bloodstream infection), Diabetes mellitus ([facility] [address] ESRD on hemodialysis ([facility] [address] H/O heart artery stent, Hyperlipidemia, and Hypertension. Past Surgical History:  has a past surgical history that includes Cardiac surgery;  section; vascular surgery (N/A, 2021); and Upper gastrointestinal endoscopy (N/A, 2021). Social History:  reports that she quit smoking about 4 years ago. She smoked 1.00 pack per day. She has never used smokeless tobacco. She reports that she does not drink alcohol and does not use drugs. Family History: family history includes Coronary Art Dis in her brother, father, and mother. The patients home medications have been reviewed.     Allergies: Morphine, Other, and Pcn [penicillins]    -------------------------------------------------- RESULTS -------------------------------------------------    LABS:  Results for orders placed or performed during the hospital encounter of 01/11/22   CBC Auto Differential   Result Value Ref Range    WBC 4.5 4.5 - 11.5 E9/L    RBC 3.13 (L) 3.50 - 5.50 E12/L    Hemoglobin 9.9 (L) 11.5 - 15.5 g/dL    Hematocrit 31.9 (L) 34.0 - 48.0 %    .9 (H) 80.0 - 99.9 fL    MCH 31.6 26.0 - 35.0 pg    MCHC 31.0 (L) 32.0 - 34.5 %    RDW 14.5 11.5 - 15.0 fL    Platelets 007 062 - 124 E9/L    MPV 10.4 7.0 - 12.0 fL    Neutrophils % 72.8 43.0 - 80.0 %    Immature Granulocytes % 0.9 0.0 - 5.0 %    Lymphocytes % 15.7 (L) 20.0 - 42.0 %    Monocytes % 10.4 2.0 - 12.0 %    Eosinophils % 0.0 0.0 - 6.0 %    Basophils % 0.2 0.0 - 2.0 %    Neutrophils Absolute 3.28 1.80 - 7.30 E9/L    Immature Granulocytes # 0.04 E9/L    Lymphocytes Absolute 0.71 (L) 1.50 - 4.00 E9/L    Monocytes Absolute 0.47 0.10 - 0.95 E9/L    Eosinophils Absolute 0.00 (L) 0.05 - 0.50 E9/L    Basophils Absolute 0.01 0.00 - 0.20 E9/L   Comprehensive Metabolic Panel w/ Reflex to MG   Result Value Ref Range    Sodium 133 132 - 146 mmol/L    Potassium reflex Magnesium 5.0 3.5 - 5.0 mmol/L    Chloride 92 (L) 98 - 107 mmol/L    CO2 18 (L) 22 - 29 mmol/L    Anion Gap 23 (H) 7 - 16 mmol/L    Glucose 145 (H) 74 - 99 mg/dL    BUN 66 (H) 6 - 23 mg/dL    CREATININE 8.4 (HH) 0.5 - 1.0 mg/dL    GFR Non-African American 5 >=60 mL/min/1.73    GFR African American 6     Calcium 7.4 (L) 8.6 - 10.2 mg/dL    Total Protein 7.5 6.4 - 8.3 g/dL    Albumin 3.8 3.5 - 5.2 g/dL    Total Bilirubin 0.3 0.0 - 1.2 mg/dL    Alkaline Phosphatase 82 35 - 104 U/L    ALT <5 0 - 32 U/L    AST 20 0 - 31 U/L   Troponin   Result Value Ref Range    Troponin, High Sensitivity 105 (H) 0 - 9 ng/L   Brain Natriuretic Peptide   Result Value Ref Range    Pro-BNP 47,179 (H) 0 - 125 pg/mL   pH, venous   Result Value Ref Range    pH, Zaki 7.35 7.35 - 7.45   Procalcitonin   Result Value Ref Range    Procalcitonin 2.45 (H) 0.00 - 0.08 ng/mL   C-Reactive Protein   Result Value Ref Range    CRP 5.2 (H) 0.0 - 0.4 mg/dL   Lactate Dehydrogenase   Result Value Ref Range     (H) 135 - 214 U/L   D-Dimer, Quantitative   Result Value Ref Range    D-Dimer, Quant 1893 ng/mL DDU   Beta-Hydroxybutyrate   Result Value Ref Range    Beta-Hydroxybutyrate 0.46 (H) 0.02 - 0.27 mmol/L   Troponin   Result Value Ref Range    Troponin, High Sensitivity 94 (H) 0 - 9 ng/L   T4, Free   Result Value Ref Range    T4 Free 1.10 0.93 - 1.70 ng/dL   POCT Glucose   Result Value Ref Range    Glucose 145 mg/dL    QC OK? ok    EKG 12 Lead   Result Value Ref Range    Ventricular Rate 80 BPM    Atrial Rate 80 BPM    P-R Interval 146 ms    QRS Duration 82 ms    Q-T Interval 408 ms    QTc Calculation (Bazett) 470 ms    P Axis 35 degrees    R Axis -12 degrees    T Axis 33 degrees       RADIOLOGY:  CTA PULMONARY W CONTRAST   Final Result   1. No pulmonary embolism. 2. Mild peripheral pulmonary infiltrates consistent with COVID-19 pneumonia. 3. Thyroid nodules, with the largest on the right measuring 2.5 cm. Per the   recommendations of the [employer] of Radiology, imaging follow-up with   sonography is recommended. RECOMMENDATIONS:   Unavailable         XR CHEST PORTABLE   Final Result   Cardiomegaly. Nothing active. EKG:  This EKG is signed and interpreted by me. Rate: 80  Rhythm: Sinus and occasional PVCs  Interpretation: LVH with non-specific EKG  Comparison: stable as compared to patient's most recent EKG      ------------------------- NURSING NOTES AND VITALS REVIEWED ---------------------------  Date / Time Roomed:  1/11/2022 12:13 PM  ED Bed Assignment:  02/02    The nursing notes within the ED encounter and vital signs as below have been reviewed.      Patient Vitals for the past 24 hrs:   BP Temp Temp src Pulse Resp SpO2 Height Weight   01/11/22 1836 (!) 142/87 -- -- 79 18 96 % -- --   01/11/22 1419 (!) 148/89 -- -- 88 18 95 % -- --   01/11/22 1314 -- -- -- -- -- 93 % -- --   01/11/22 1223 (!) 159/83 98.1 °F (36.7 °C) -- 87 18 97 % -- --   01/11/22 1209 -- 98 °F (36.7 °C) Temporal -- 18 -- 5' 4\" (1.626 m) 242 lb 8 oz (110 kg)       Oxygen Saturation Interpretation: Improved after treatment    ------------------------------------------ PROGRESS NOTES ------------------------------------------  Re-evaluation(s):  Time: 1300  Patients symptoms show no change  Repeat physical examination is not changed    Counseling:  I have spoken with the patient and discussed todays results, in addition to providing specific details for the plan of care and counseling regarding the diagnosis and prognosis. Their questions are answered at this time and they are agreeable with the plan of admission.    --------------------------------- ADDITIONAL PROVIDER NOTES ---------------------------------  Consultations:  Spoke with Dr. Willis Whitlock. Discussed case. They will admit the patient. This patient's ED course included: a personal history and physicial examination, re-evaluation prior to disposition, multiple bedside re-evaluations, IV medications, cardiac monitoring and continuous pulse oximetry    This patient has remained hemodynamically stable during their ED course. Diagnosis:  1. Periumbilical abdominal pain    2. Diarrhea, unspecified type    3. Pneumonia due to COVID-19 virus    4. Stage 5 chronic kidney disease on chronic dialysis (HCC)        Disposition:  Patient's disposition: Admit to telemetry  Patient's condition is stable.            Mazin Dietrich MD  Resident  01/11/22 8477

## 2022-01-11 NOTE — PROGRESS NOTES
[facility]   Department of Emergency Medicine ED  Provider Note  ED Room:     Radiology Procedure Waiver   Name: Sandrine Robertson  : 1955  MRN: [id number]   Date:  22    Time: 2:49 PM EST    Benefits of immediately proceeding with Radiology exam(s) without pre-testing outweigh the risks or are not indicated as specified below and therefore the following is/are being waived:    [] Pregnancy test:   [] Patients LMP on-time and regular.   [] Patient had Tubal Ligation or has other Contraception Device. [] Patient  is Menopausal or Premenarcheal.    [] Patient had Full or Partial Hysterectomy. [] Protocol for Iodine allergy    [] MRI Questionnaire    [] BUN/Creatinine   [] Patient age w/no hx of renal dysfunction. [x] Patient on Dialysis. [] Recent Normal Labs.     Electronically signed by Nona Talley MD    22    2:49 PM EST

## 2022-01-12 LAB
ALBUMIN SERPL-MCNC: 3.9 G/DL (ref 3.5–5.2)
ALP BLD-CCNC: 78 U/L (ref 35–104)
ALT SERPL-CCNC: <5 U/L (ref 0–32)
ANION GAP SERPL CALCULATED.3IONS-SCNC: 26 MMOL/L (ref 7–16)
AST SERPL-CCNC: 20 U/L (ref 0–31)
BASOPHILS ABSOLUTE: 0 E9/L (ref 0–0.2)
BASOPHILS RELATIVE PERCENT: 0 % (ref 0–2)
BILIRUB SERPL-MCNC: 0.3 MG/DL (ref 0–1.2)
BUN BLDV-MCNC: 81 MG/DL (ref 6–23)
BURR CELLS: ABNORMAL
C-REACTIVE PROTEIN: 5.8 MG/DL (ref 0–0.4)
CALCIUM SERPL-MCNC: 7.2 MG/DL (ref 8.6–10.2)
CHLORIDE BLD-SCNC: 91 MMOL/L (ref 98–107)
CO2: 14 MMOL/L (ref 22–29)
CREAT SERPL-MCNC: 9.4 MG/DL (ref 0.5–1)
D DIMER: 1655 NG/ML DDU
EKG ATRIAL RATE: 80 BPM
EKG P AXIS: 35 DEGREES
EKG P-R INTERVAL: 146 MS
EKG Q-T INTERVAL: 408 MS
EKG QRS DURATION: 82 MS
EKG QTC CALCULATION (BAZETT): 470 MS
EKG R AXIS: -12 DEGREES
EKG T AXIS: 33 DEGREES
EKG VENTRICULAR RATE: 80 BPM
EOSINOPHILS ABSOLUTE: 0 E9/L (ref 0.05–0.5)
EOSINOPHILS RELATIVE PERCENT: 0 % (ref 0–6)
FERRITIN: 1939 NG/ML
FIBRINOGEN: 479 MG/DL (ref 225–540)
GFR AFRICAN AMERICAN: 5
GFR NON-AFRICAN AMERICAN: 4 ML/MIN/1.73
GLUCOSE BLD-MCNC: 262 MG/DL (ref 74–99)
HCT VFR BLD CALC: 33 % (ref 34–48)
HEMOGLOBIN: 10.1 G/DL (ref 11.5–15.5)
LYMPHOCYTES ABSOLUTE: 0.34 E9/L (ref 1.5–4)
LYMPHOCYTES RELATIVE PERCENT: 14 % (ref 20–42)
MCH RBC QN AUTO: 30.8 PG (ref 26–35)
MCHC RBC AUTO-ENTMCNC: 30.6 % (ref 32–34.5)
MCV RBC AUTO: 100.6 FL (ref 80–99.9)
METER GLUCOSE: 158 MG/DL (ref 74–99)
METER GLUCOSE: 252 MG/DL (ref 74–99)
METER GLUCOSE: 308 MG/DL (ref 74–99)
METER GLUCOSE: 368 MG/DL (ref 74–99)
MONOCYTES ABSOLUTE: 0.31 E9/L (ref 0.1–0.95)
MONOCYTES RELATIVE PERCENT: 13.2 % (ref 2–12)
NEUTROPHILS ABSOLUTE: 1.75 E9/L (ref 1.8–7.3)
NEUTROPHILS RELATIVE PERCENT: 72.8 % (ref 43–80)
NUCLEATED RED BLOOD CELLS: 0 /100 WBC
OVALOCYTES: ABNORMAL
PDW BLD-RTO: 14.4 FL (ref 11.5–15)
PLATELET # BLD: 180 E9/L (ref 130–450)
PMV BLD AUTO: 11 FL (ref 7–12)
POIKILOCYTES: ABNORMAL
POLYCHROMASIA: ABNORMAL
POTASSIUM REFLEX MAGNESIUM: 6.2 MMOL/L (ref 3.5–5)
RBC # BLD: 3.28 E12/L (ref 3.5–5.5)
REASON FOR REJECTION: NORMAL
REJECTED TEST: NORMAL
SODIUM BLD-SCNC: 131 MMOL/L (ref 132–146)
TOTAL PROTEIN: 7.8 G/DL (ref 6.4–8.3)
TSH SERPL DL<=0.05 MIU/L-ACNC: 1.03 UIU/ML (ref 0.27–4.2)
VITAMIN D 25-HYDROXY: 8 NG/ML (ref 30–100)
WBC # BLD: 2.4 E9/L (ref 4.5–11.5)

## 2022-01-12 PROCEDURE — 36415 COLL VENOUS BLD VENIPUNCTURE: CPT

## 2022-01-12 PROCEDURE — 6360000002 HC RX W HCPCS: Performed by: NURSE PRACTITIONER

## 2022-01-12 PROCEDURE — 2580000003 HC RX 258: Performed by: NURSE PRACTITIONER

## 2022-01-12 PROCEDURE — 87040 BLOOD CULTURE FOR BACTERIA: CPT

## 2022-01-12 PROCEDURE — 1200000000 HC SEMI PRIVATE

## 2022-01-12 PROCEDURE — 80053 COMPREHEN METABOLIC PANEL: CPT

## 2022-01-12 PROCEDURE — 6370000000 HC RX 637 (ALT 250 FOR IP): Performed by: NURSE PRACTITIONER

## 2022-01-12 PROCEDURE — 90935 HEMODIALYSIS ONE EVALUATION: CPT | Performed by: INTERNAL MEDICINE

## 2022-01-12 PROCEDURE — XW033E5 INTRODUCTION OF REMDESIVIR ANTI-INFECTIVE INTO PERIPHERAL VEIN, PERCUTANEOUS APPROACH, NEW TECHNOLOGY GROUP 5: ICD-10-PCS | Performed by: FAMILY MEDICINE

## 2022-01-12 PROCEDURE — 82728 ASSAY OF FERRITIN: CPT

## 2022-01-12 PROCEDURE — 86140 C-REACTIVE PROTEIN: CPT

## 2022-01-12 PROCEDURE — 85025 COMPLETE CBC W/AUTO DIFF WBC: CPT

## 2022-01-12 PROCEDURE — 85384 FIBRINOGEN ACTIVITY: CPT

## 2022-01-12 PROCEDURE — 5A1D70Z PERFORMANCE OF URINARY FILTRATION, INTERMITTENT, LESS THAN 6 HOURS PER DAY: ICD-10-PCS | Performed by: INTERNAL MEDICINE

## 2022-01-12 PROCEDURE — 85378 FIBRIN DEGRADE SEMIQUANT: CPT

## 2022-01-12 PROCEDURE — 82306 VITAMIN D 25 HYDROXY: CPT

## 2022-01-12 PROCEDURE — 84443 ASSAY THYROID STIM HORMONE: CPT

## 2022-01-12 PROCEDURE — 82962 GLUCOSE BLOOD TEST: CPT

## 2022-01-12 RX ADMIN — INSULIN LISPRO 4 UNITS: 100 INJECTION, SOLUTION INTRAVENOUS; SUBCUTANEOUS at 11:52

## 2022-01-12 RX ADMIN — HEPARIN SODIUM 5000 UNITS: 10000 INJECTION INTRAVENOUS; SUBCUTANEOUS at 22:16

## 2022-01-12 RX ADMIN — CLOPIDOGREL BISULFATE 75 MG: 75 TABLET, FILM COATED ORAL at 10:29

## 2022-01-12 RX ADMIN — ASPIRIN 81 MG: 81 TABLET, COATED ORAL at 10:29

## 2022-01-12 RX ADMIN — CARVEDILOL 12.5 MG: 6.25 TABLET, FILM COATED ORAL at 17:49

## 2022-01-12 RX ADMIN — DEXAMETHASONE 6 MG: 4 TABLET ORAL at 10:28

## 2022-01-12 RX ADMIN — AZITHROMYCIN DIHYDRATE 500 MG: 500 INJECTION, POWDER, LYOPHILIZED, FOR SOLUTION INTRAVENOUS at 22:16

## 2022-01-12 RX ADMIN — GUAIFENESIN AND DEXTROMETHORPHAN 5 ML: 100; 10 SYRUP ORAL at 10:27

## 2022-01-12 RX ADMIN — ATORVASTATIN CALCIUM 40 MG: 40 TABLET, FILM COATED ORAL at 10:28

## 2022-01-12 RX ADMIN — OXYCODONE HYDROCHLORIDE AND ACETAMINOPHEN 500 MG: 500 TABLET ORAL at 10:28

## 2022-01-12 RX ADMIN — INSULIN LISPRO 1 UNITS: 100 INJECTION, SOLUTION INTRAVENOUS; SUBCUTANEOUS at 18:01

## 2022-01-12 RX ADMIN — Medication 10 ML: at 10:27

## 2022-01-12 RX ADMIN — ACETAMINOPHEN 650 MG: 325 TABLET ORAL at 10:29

## 2022-01-12 RX ADMIN — SODIUM CHLORIDE, PRESERVATIVE FREE 1000 MG: 5 INJECTION INTRAVENOUS at 22:14

## 2022-01-12 RX ADMIN — INSULIN LISPRO 2 UNITS: 100 INJECTION, SOLUTION INTRAVENOUS; SUBCUTANEOUS at 22:16

## 2022-01-12 RX ADMIN — INSULIN GLARGINE 25 UNITS: 100 INJECTION, SOLUTION SUBCUTANEOUS at 10:33

## 2022-01-12 RX ADMIN — OXYCODONE HYDROCHLORIDE AND ACETAMINOPHEN 500 MG: 500 TABLET ORAL at 22:14

## 2022-01-12 RX ADMIN — INSULIN LISPRO 5 UNITS: 100 INJECTION, SOLUTION INTRAVENOUS; SUBCUTANEOUS at 10:33

## 2022-01-12 RX ADMIN — PANTOPRAZOLE SODIUM 40 MG: 40 TABLET, DELAYED RELEASE ORAL at 06:31

## 2022-01-12 RX ADMIN — Medication 10 ML: at 22:32

## 2022-01-12 RX ADMIN — Medication 6000 UNITS: at 10:30

## 2022-01-12 RX ADMIN — HEPARIN SODIUM 5000 UNITS: 10000 INJECTION INTRAVENOUS; SUBCUTANEOUS at 06:31

## 2022-01-12 RX ADMIN — ZINC SULFATE 220 MG (50 MG) CAPSULE 50 MG: CAPSULE at 10:28

## 2022-01-12 ASSESSMENT — PAIN SCALES - GENERAL
PAINLEVEL_OUTOF10: 3
PAINLEVEL_OUTOF10: 0
PAINLEVEL_OUTOF10: 0
PAINLEVEL_OUTOF10: 6
PAINLEVEL_OUTOF10: 0

## 2022-01-12 ASSESSMENT — PAIN DESCRIPTION - DESCRIPTORS: DESCRIPTORS: ACHING

## 2022-01-12 ASSESSMENT — PAIN DESCRIPTION - LOCATION: LOCATION: HEAD

## 2022-01-12 NOTE — FLOWSHEET NOTE
Pt completed 3:40 HD on a 2K bath with 2.5L of UF removed safely. 01/12/22 1655   Vital Signs   BP (!) 149/84   Pulse 67   Resp 18   Pain Assessment   Pain Assessment 0-10   Pain Level 0   Post-Hemodialysis Assessment   Post-Treatment Procedures Blood returned;Catheter capped, clamped and heparinized x 2 ports   Machine Disinfection Process Acid/Vinegar Clean;Heat Disinfect; Exterior Machine Disinfection   Rinseback Volume (ml) 300 ml   Total Liters Processed (l/min) 80.1 l/min   Dialyzer Clearance Lightly streaked   Duration of Treatment (minutes) 214 minutes   Hemodialysis Output (ml) 2900 ml   NET Removed (ml) 2600 ml   Tolerated Treatment Good   Patient Response to Treatment tx termed with 26 min remaining in treatment due to cramping; Dr. Anais Carrasco aware; cath care per [facility] policy; cramping resolved with extra fluid post tx; pt stable; care transfered to floor   Bilateral Breath Sounds Diminished

## 2022-01-12 NOTE — H&P
[facility] History and Physical      CHIEF COMPLAINT:    Shortness of breath    History of Present Illness:      Ms. Lynne Orellana presented with dyspnea and cough worsened by exertion, nausea, diarrhea without abdominal pain, body aches for the past week after testing positive for COVID on Thursday on home test confirmed positive at [facility] urgent care, and has been quarantining at home. She has been getting her dialysis through Dr. Salvatore Guevara in an isolation room on her scheduled days. Her last session was Saturday which she did well with, but on Sunday she woke up with worsening symptoms. She had diarrhea which made her late to her last session and when she called the dialysis center to notify them she was running late was advised to report to the ED for evaluation. Her diarrhea has been unresponsive to imodium. In the ED she was started on oxygen for dyspnea and CTA was done which was negative for PE but positive for pulmonary infiltrates. Incidentally, thyroid nodules were identified which patient has been aware of but never formally evaluated as she is not established with a PCP. She is to see Dr. Kennie Opitz as she is new to the area from [place]. Informant(s) for H&P: Patient    REVIEW OF SYSTEMS:  Review of Systems   Constitutional: Positive for fatigue. HENT: Positive for postnasal drip. Eyes: Negative. Respiratory: Positive for cough and shortness of breath. Cardiovascular: Negative. Gastrointestinal: Positive for diarrhea and nausea. Endocrine: Negative. Genitourinary: Negative. Musculoskeletal: Positive for myalgias. Skin: Negative. Allergic/Immunologic: Negative. Neurological: Negative. Hematological: Negative. Psychiatric/Behavioral: Negative.         PMH:  Past Medical History:   Diagnosis Date    Brain aneurysm     CLABSI (central line-associated bloodstream infection) 11/19/2021    [facility]     ESRD on hemodialysis ([facility] 2021    H/O heart artery stent     Hyperlipidemia     Hypertension        Surgical History:  Past Surgical History:   Procedure Laterality Date    CARDIAC SURGERY       SECTION      UPPER GASTROINTESTINAL ENDOSCOPY N/A 2021    EGD BIOPSY performed by Mynor Lin MD at [address] [place] [address] N/A 2021    CATHETER INSERTION  TUNNELED HEMODIALYSIS, WITH REMOVAL OF TEMPORARY CATHETER performed by Council Cowden, MD at [facility] OR       Medications Prior to Admission:    Prior to Admission medications    Medication Sig Start Date End Date Taking? Authorizing Provider   pantoprazole (PROTONIX) 40 MG tablet Take 1 tablet by mouth every morning (before breakfast) 12/3/21   [facility], APRN - CNP   atorvastatin (LIPITOR) 40 MG tablet Take 40 mg by mouth daily    Historical Provider, MD   carvedilol (COREG) 12.5 MG tablet Take 12.5 mg by mouth 2 times daily (with meals)    Historical Provider, MD   clopidogrel (PLAVIX) 75 MG tablet Take 75 mg by mouth daily    Historical Provider, MD   aspirin 81 MG EC tablet Take 81 mg by mouth daily    Historical Provider, MD   Insulin Glargine, 2 Unit Dial, (TOUJEO MAX SOLOSTAR) 300 UNIT/ML SOPN Inject 25 Units into the skin daily    Historical Provider, MD   insulin aspart (NOVOLOG) 100 UNIT/ML injection vial Inject 3 Units into the skin 3 times daily (before meals)    Historical Provider, MD       Allergies:    Morphine, Other, and Pcn [penicillins]    Social History:    reports that she quit smoking about 4 years ago. She smoked 1.00 pack per day. She has never used smokeless tobacco. She reports that she does not drink alcohol and does not use drugs. Family History:   family history includes Coronary Art Dis in her brother, father, and mother.        PHYSICAL EXAM:  Vitals:  BP (!) 142/87   Pulse 79   Temp 98.1 °F (36.7 °C)   Resp 18   Ht 5' 4\" (1.626 m)   Wt 242 lb 8 oz (110 kg)   SpO2 96%   BMI 41.63 kg/m²     General Appearance: Alert and oriented to person, place and time. Up in chair eating dinner. Skin: Warm and dry  Head: Normocephalic and atraumatic  Eyes: Pupils equal, round, and reactive to light, extraocular eye movements intact, conjunctivae normal  Neck: Supple and non tender without mass   Pulmonary/Chest: Scattered fine rales or rhonchi. Normal air movement. No respiratory distress  Cardiovascular: Normal rate, normal S1 and S2  Abdomen: Soft, non-tender, non-distended, normal bowel sounds. No peritoneal signs, no rebound rigidity or guarding. Extremities: Symmetric ROM and strength. BLE edema with compression stockings on. Right chest tesio dressed  Neurologic: Clear speech, no facial asymmetry. Non focal        LABS:  Recent Labs     01/11/22  1245 01/11/22  1437     --    K 5.0  --    CL 92*  --    CO2 18*  --    BUN 66*  --    CREATININE 8.4*  --    GLUCOSE 145* 145   CALCIUM 7.4*  --        Recent Labs     01/11/22  1245   WBC 4.5   RBC 3.13*   HGB 9.9*   HCT 31.9*   .9*   MCH 31.6   MCHC 31.0*   RDW 14.5      MPV 10.4       Radiology:   CTA PULMONARY W CONTRAST   Final Result   1. No pulmonary embolism. 2. Mild peripheral pulmonary infiltrates consistent with COVID-19 pneumonia. 3. Thyroid nodules, with the largest on the right measuring 2.5 cm. Per the   recommendations of the [address] [place] [address] St of Radiology, imaging follow-up with   sonography is recommended. RECOMMENDATIONS:   Unavailable         XR CHEST PORTABLE   Final Result   Cardiomegaly. Nothing active. EKG:    Sinus rhythm with premature supraventricular complexes  Left ventricular hypertrophy with repolarization abnormality  Abnormal ECG  When compared with ECG of 30-NOV-2021 10:46,  premature ventricular complexes are no longer present  premature supraventricular complexes are now present  T wave inversion less evident in Anterolateral leads      ASSESSMENT:      Active Problems:    Pneumonia due to COVID-19 virus  Resolved Problems:    * No resolved hospital problems. *      PLAN:    1. COVID  - Start vitamin C, D, zinc  - Daily decadron - received 10 mg in ED  - Procal > 2.45 - start empiric zithro, ceftriaxone, trend  - PRN robitussin  - Send sputum, strep legionella ag  - Encourage self prone as tolerated    2. Hx ESRD  - Dialysis day T-Th-Sat  - Follows with Dr. Jaime Reed, consulted in ED  - Daily weights    3. Hx DBM  - Resume home lantus  - Start sliding scale    4. Hx HTN  - Resume home coreg    5. Hx HLP  - Resume home statin    6. Thyroid nodule on imaging - incidental  - Check T4, TSH  - Ultrasound done 12/2 without aggressive features - further follow up was advised. Defer to PCP. Code Status:Full  DVT prophylaxis: SQH  GI prophylaxis: pantoprazole  Bowel regimen: Hold with diarrhea, check c diff  Activity: Up as tolerated   Nutrition: Regular low sodium diet      NOTE: This report was transcribed using voice recognition software. Every effort was made to ensure accuracy; however, inadvertent computerized transcription errors may be present.   Electronically signed by TAWNYA Chandler CNP on 1/11/2022 at 7:11 PM

## 2022-01-12 NOTE — ED NOTES
Bed: 04  Expected date:   Expected time:   Means of arrival:   Comments:  Room [address], RN  01/12/22 9151

## 2022-01-12 NOTE — PROGRESS NOTES
Database initiated pharmacy and medications verified with the patient. She is a dialysis patient she goes Tue thur sat to [facility] dialysis [place]. Her nephrologist is Dr Ernestina Alexander. She hasn't been living in [facility] her new PCP is going to be Dr Phillip Krueger she has her first appointment with her 19th of this month.

## 2022-01-12 NOTE — ED NOTES
PT ASSISTED TO BR AT THIS TIME VIA WHEELCHAIR.  NO SIGNS OF DISTRESS     Srinivasan Fish RN  01/12/22 0105

## 2022-01-13 LAB
ALBUMIN SERPL-MCNC: 3.9 G/DL (ref 3.5–5.2)
ALP BLD-CCNC: 82 U/L (ref 35–104)
ALT SERPL-CCNC: <5 U/L (ref 0–32)
ANION GAP SERPL CALCULATED.3IONS-SCNC: 21 MMOL/L (ref 7–16)
AST SERPL-CCNC: 20 U/L (ref 0–31)
ATYPICAL LYMPHOCYTE RELATIVE PERCENT: 0.9 % (ref 0–4)
BASOPHILS ABSOLUTE: 0 E9/L (ref 0–0.2)
BASOPHILS RELATIVE PERCENT: 0 % (ref 0–2)
BILIRUB SERPL-MCNC: 0.2 MG/DL (ref 0–1.2)
BUN BLDV-MCNC: 41 MG/DL (ref 6–23)
C-REACTIVE PROTEIN: 5.3 MG/DL (ref 0–0.4)
CALCIUM SERPL-MCNC: 8.1 MG/DL (ref 8.6–10.2)
CHLORIDE BLD-SCNC: 92 MMOL/L (ref 98–107)
CO2: 22 MMOL/L (ref 22–29)
CREAT SERPL-MCNC: 5.7 MG/DL (ref 0.5–1)
D DIMER: 1594 NG/ML DDU
EOSINOPHILS ABSOLUTE: 0 E9/L (ref 0.05–0.5)
EOSINOPHILS RELATIVE PERCENT: 0 % (ref 0–6)
GFR AFRICAN AMERICAN: 9
GFR NON-AFRICAN AMERICAN: 7 ML/MIN/1.73
GLUCOSE BLD-MCNC: 215 MG/DL (ref 74–99)
HCT VFR BLD CALC: 31.9 % (ref 34–48)
HEMOGLOBIN: 10.1 G/DL (ref 11.5–15.5)
LYMPHOCYTES ABSOLUTE: 0.24 E9/L (ref 1.5–4)
LYMPHOCYTES RELATIVE PERCENT: 2.6 % (ref 20–42)
MAGNESIUM: 2 MG/DL (ref 1.6–2.6)
MCH RBC QN AUTO: 31.1 PG (ref 26–35)
MCHC RBC AUTO-ENTMCNC: 31.7 % (ref 32–34.5)
MCV RBC AUTO: 98.2 FL (ref 80–99.9)
METER GLUCOSE: 151 MG/DL (ref 74–99)
METER GLUCOSE: 178 MG/DL (ref 74–99)
METER GLUCOSE: 181 MG/DL (ref 74–99)
METER GLUCOSE: 270 MG/DL (ref 74–99)
METER GLUCOSE: 91 MG/DL (ref 74–99)
MONOCYTES ABSOLUTE: 0.42 E9/L (ref 0.1–0.95)
MONOCYTES RELATIVE PERCENT: 7 % (ref 2–12)
NEUTROPHILS ABSOLUTE: 5.4 E9/L (ref 1.8–7.3)
NEUTROPHILS RELATIVE PERCENT: 89.5 % (ref 43–80)
NUCLEATED RED BLOOD CELLS: 0 /100 WBC
OVALOCYTES: ABNORMAL
PDW BLD-RTO: 14.4 FL (ref 11.5–15)
PHOSPHORUS: 6 MG/DL (ref 2.5–4.5)
PLATELET # BLD: 212 E9/L (ref 130–450)
PMV BLD AUTO: 11.4 FL (ref 7–12)
POIKILOCYTES: ABNORMAL
POLYCHROMASIA: ABNORMAL
POTASSIUM REFLEX MAGNESIUM: 4.5 MMOL/L (ref 3.5–5)
POTASSIUM SERPL-SCNC: 4.5 MMOL/L (ref 3.5–5)
PROCALCITONIN: 3.67 NG/ML (ref 0–0.08)
RBC # BLD: 3.25 E12/L (ref 3.5–5.5)
SODIUM BLD-SCNC: 135 MMOL/L (ref 132–146)
TOTAL PROTEIN: 7.5 G/DL (ref 6.4–8.3)
WBC # BLD: 6 E9/L (ref 4.5–11.5)

## 2022-01-13 PROCEDURE — 2580000003 HC RX 258: Performed by: NURSE PRACTITIONER

## 2022-01-13 PROCEDURE — 99233 SBSQ HOSP IP/OBS HIGH 50: CPT | Performed by: INTERNAL MEDICINE

## 2022-01-13 PROCEDURE — 6360000002 HC RX W HCPCS: Performed by: NURSE PRACTITIONER

## 2022-01-13 PROCEDURE — 90935 HEMODIALYSIS ONE EVALUATION: CPT | Performed by: INTERNAL MEDICINE

## 2022-01-13 PROCEDURE — 84100 ASSAY OF PHOSPHORUS: CPT

## 2022-01-13 PROCEDURE — 6370000000 HC RX 637 (ALT 250 FOR IP): Performed by: NURSE PRACTITIONER

## 2022-01-13 PROCEDURE — 85025 COMPLETE CBC W/AUTO DIFF WBC: CPT

## 2022-01-13 PROCEDURE — 85378 FIBRIN DEGRADE SEMIQUANT: CPT

## 2022-01-13 PROCEDURE — 2580000003 HC RX 258: Performed by: FAMILY MEDICINE

## 2022-01-13 PROCEDURE — 86140 C-REACTIVE PROTEIN: CPT

## 2022-01-13 PROCEDURE — 80053 COMPREHEN METABOLIC PANEL: CPT

## 2022-01-13 PROCEDURE — 80048 BASIC METABOLIC PNL TOTAL CA: CPT

## 2022-01-13 PROCEDURE — 84145 PROCALCITONIN (PCT): CPT

## 2022-01-13 PROCEDURE — 82962 GLUCOSE BLOOD TEST: CPT

## 2022-01-13 PROCEDURE — 83735 ASSAY OF MAGNESIUM: CPT

## 2022-01-13 PROCEDURE — 2500000003 HC RX 250 WO HCPCS: Performed by: FAMILY MEDICINE

## 2022-01-13 PROCEDURE — 1200000000 HC SEMI PRIVATE

## 2022-01-13 PROCEDURE — 36415 COLL VENOUS BLD VENIPUNCTURE: CPT

## 2022-01-13 PROCEDURE — 6360000002 HC RX W HCPCS: Performed by: INTERNAL MEDICINE

## 2022-01-13 RX ORDER — INSULIN GLARGINE 100 [IU]/ML
25 INJECTION, SOLUTION SUBCUTANEOUS 2 TIMES DAILY
Status: DISCONTINUED | OUTPATIENT
Start: 2022-01-13 | End: 2022-01-14 | Stop reason: HOSPADM

## 2022-01-13 RX ADMIN — AZITHROMYCIN DIHYDRATE 500 MG: 500 INJECTION, POWDER, LYOPHILIZED, FOR SOLUTION INTRAVENOUS at 20:57

## 2022-01-13 RX ADMIN — ZINC SULFATE 220 MG (50 MG) CAPSULE 50 MG: CAPSULE at 12:29

## 2022-01-13 RX ADMIN — EPOETIN ALFA-EPBX 3320 UNITS: 4000 INJECTION, SOLUTION INTRAVENOUS; SUBCUTANEOUS at 13:35

## 2022-01-13 RX ADMIN — HEPARIN SODIUM 5000 UNITS: 10000 INJECTION INTRAVENOUS; SUBCUTANEOUS at 21:07

## 2022-01-13 RX ADMIN — Medication 6000 UNITS: at 12:28

## 2022-01-13 RX ADMIN — OXYCODONE HYDROCHLORIDE AND ACETAMINOPHEN 500 MG: 500 TABLET ORAL at 20:05

## 2022-01-13 RX ADMIN — INSULIN LISPRO 2 UNITS: 100 INJECTION, SOLUTION INTRAVENOUS; SUBCUTANEOUS at 20:50

## 2022-01-13 RX ADMIN — ASPIRIN 81 MG: 81 TABLET, COATED ORAL at 12:29

## 2022-01-13 RX ADMIN — SODIUM CHLORIDE, PRESERVATIVE FREE 1000 MG: 5 INJECTION INTRAVENOUS at 20:05

## 2022-01-13 RX ADMIN — ACETAMINOPHEN 650 MG: 325 TABLET ORAL at 08:55

## 2022-01-13 RX ADMIN — INSULIN LISPRO 1 UNITS: 100 INJECTION, SOLUTION INTRAVENOUS; SUBCUTANEOUS at 17:17

## 2022-01-13 RX ADMIN — ONDANSETRON 4 MG: 2 INJECTION INTRAMUSCULAR; INTRAVENOUS at 00:11

## 2022-01-13 RX ADMIN — REMDESIVIR 200 MG: 100 INJECTION, POWDER, LYOPHILIZED, FOR SOLUTION INTRAVENOUS at 12:33

## 2022-01-13 RX ADMIN — TRIMETHOBENZAMIDE HYDROCHLORIDE 200 MG: 100 INJECTION INTRAMUSCULAR at 04:42

## 2022-01-13 RX ADMIN — Medication 10 ML: at 12:32

## 2022-01-13 RX ADMIN — INSULIN LISPRO 1 UNITS: 100 INJECTION, SOLUTION INTRAVENOUS; SUBCUTANEOUS at 08:45

## 2022-01-13 RX ADMIN — CLOPIDOGREL BISULFATE 75 MG: 75 TABLET, FILM COATED ORAL at 12:29

## 2022-01-13 RX ADMIN — DEXAMETHASONE 6 MG: 4 TABLET ORAL at 12:29

## 2022-01-13 RX ADMIN — Medication 10 ML: at 20:06

## 2022-01-13 RX ADMIN — OXYCODONE HYDROCHLORIDE AND ACETAMINOPHEN 500 MG: 500 TABLET ORAL at 13:43

## 2022-01-13 RX ADMIN — CARVEDILOL 12.5 MG: 6.25 TABLET, FILM COATED ORAL at 17:07

## 2022-01-13 RX ADMIN — HEPARIN SODIUM 5000 UNITS: 10000 INJECTION INTRAVENOUS; SUBCUTANEOUS at 13:45

## 2022-01-13 RX ADMIN — PANTOPRAZOLE SODIUM 40 MG: 40 TABLET, DELAYED RELEASE ORAL at 12:29

## 2022-01-13 RX ADMIN — ATORVASTATIN CALCIUM 40 MG: 40 TABLET, FILM COATED ORAL at 12:29

## 2022-01-13 ASSESSMENT — PAIN SCALES - GENERAL
PAINLEVEL_OUTOF10: 0
PAINLEVEL_OUTOF10: 0
PAINLEVEL_OUTOF10: 2
PAINLEVEL_OUTOF10: 0
PAINLEVEL_OUTOF10: 0
PAINLEVEL_OUTOF10: 2

## 2022-01-13 ASSESSMENT — PAIN DESCRIPTION - DESCRIPTORS: DESCRIPTORS: ACHING

## 2022-01-13 ASSESSMENT — PAIN DESCRIPTION - PAIN TYPE
TYPE: ACUTE PAIN;OTHER (COMMENT)
TYPE: ACUTE PAIN

## 2022-01-13 ASSESSMENT — PAIN DESCRIPTION - LOCATION: LOCATION: HEAD

## 2022-01-13 NOTE — CONSULTS
[facility] MD Eladio Aaron MD Ledell Poag, MD Carleton Morita, MD Anniece Maywood, CASH Bernardo, YOSHI  Consultation  1/12/2022    Thank you for consult  Full note dictated, to follow  Briefly, 73 y.o. woman known to me from outpatient practice for follow her for ESRD. She undergoes chronic intermittent hemodialysis on Tuesdays Thursdays and Saturdays at the [facility]. She was diagnosed with COVID last week. She had a cough and tickle in her throat, no fever, this also developed fatigue, malaise, myalgias, cephalgia, then diarrhea which has been profuse for the 2 days preceding her presentation to the ER. As condition worsen, she came to the ER. With Imodium diarrhea has improved. With supplemental oxygen, dyspnea has resolved. Dialysis today for solute and volume clearance without event. BP stable throughout treatment. A/R:  1. ESRD  2. Anemia due to ESRD  3. 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 Hypertension  5. GTAGA-86 pneumonia, complicated by diarrhea      Continue IHD support for solute and volume clearance.    Plan to resume usual TTS schedule tomorrow, they will evaluate in a.m. as to what to do with her schedule while here  JHONATAN  Continue other aspects of renal management  Continue supportive care    Eladio Singer MD, MD

## 2022-01-13 NOTE — FLOWSHEET NOTE
Pt completed 3hrs on a 3K bath with 1.7    01/13/22 1200   Vital Signs   BP (!) 135/55   Temp 99.9 °F (37.7 °C)   Pulse 69   Resp 18   Weight 241 lb 10 oz (109.6 kg)   Weight Method Bed scale   Percent Weight Change -1.53   Pain Assessment   Pain Assessment 0-10   Pain Level 0   Post-Hemodialysis Assessment   Post-Treatment Procedures Blood returned;Catheter capped, clamped and heparinized x 2 ports   Machine Disinfection Process Acid/Vinegar Clean;Heat Disinfect; Exterior Machine Disinfection   Rinseback Volume (ml) 2300 ml   Total Liters Processed (l/min) 66.5 l/min   Dialyzer Clearance Lightly streaked   Duration of Treatment (minutes) 210 minutes   Hemodialysis Output (ml) 2000 ml   NET Removed (ml) 1700 ml   Tolerated Treatment Good   Patient Response to Treatment tolerated well; cath care per [facility] policy; post report to [facility] JENNA NUNEZ RN   Bilateral Breath Sounds Diminished   Edema Generalized   L of UF removed safely.

## 2022-01-13 NOTE — PLAN OF CARE
Problem: Falls - Risk of:  Goal: Will remain free from falls  Description: Will remain free from falls  Outcome: Met This Shift     Problem: Airway Clearance - Ineffective  Goal: Achieve or maintain patent airway  Outcome: Met This Shift

## 2022-01-13 NOTE — PROGRESS NOTES
[facility] Corey Mathew, MD Brooks Horowitz, MD Regina Lindquist, MD Eduar Gonzalez, CNP   Jaja Echavarria, YOSHI  Progress Note  1/13/2022    SUBJECTIVE:  We are following this patient for end-stage renal failure . 1/13: \"Rough night,\" as \"the antibiotics made me sick. \"  Nausea and vomiting multiple times overnight. No dyspnea on nasal cannula, respiratory status about the same. Ongoing nonproductive though wet sounding cough. No fever or chill. No chest pain or palpitations.   Anorexia    PROBLEM LIST:    Patient Active Problem List   Diagnosis    Nausea & vomiting    MSSA bacteremia    CLABSI (central line-associated bloodstream infection)    [facility]    Fever, unspecified    Essential hypertension    Hyperlipidemia    [facility] type [facility] complications [facility]    Neck pain    Anemia in CKD (chronic kidney disease)    Pneumonia due to COVID-19 virus        PAST MEDICAL HISTORY:    Past Medical History:   Diagnosis Date    Brain aneurysm     CLABSI (central line-associated bloodstream infection) 11/19/2021    Diabetes mellitus ([facility]     ESRD on hemodialysis ([facility] 11/19/2021    H/O heart artery stent     Hyperlipidemia     Hypertension        MEDS (scheduled):   remdesivir IVPB  200 mg IntraVENous Once    [START ON 1/14/2022] remdesivir IVPB  100 mg IntraVENous Q24H    sodium chloride flush  5-40 mL IntraVENous 2 times per day    pantoprazole  40 mg Oral QAM AC    heparin (porcine)  5,000 Units SubCUTAneous 3 times per day    dexamethasone  6 mg Oral Daily    Vitamin D  6,000 Units Oral Daily    Followed by   John Nevarez ON 1/18/2022] Vitamin D  2,000 Units Oral Daily    ascorbic acid  500 mg Oral TID    zinc sulfate  50 mg Oral Daily    cefTRIAXone (ROCEPHIN) IV  1,000 mg IntraVENous Q24H    azithromycin  500 mg IntraVENous Q24H    aspirin  81 mg Oral Daily    atorvastatin  40 mg Oral Daily    carvedilol  12.5 mg Oral BID     clopidogrel  75 mg Oral Daily    insulin glargine  25 Units SubCUTAneous Daily    insulin lispro  0-6 Units SubCUTAneous TID WC    insulin lispro  0-3 Units SubCUTAneous Nightly       MEDS (infusions):   sodium chloride         MEDS (prn):  trimethobenzamide, sodium chloride flush, sodium chloride, ondansetron **OR** ondansetron, acetaminophen **OR** acetaminophen, guaiFENesin-dextromethorphan, sodium chloride    DIET:    ADULT DIET; Regular; 4 carb choices (60 gm/meal); Low Sodium (2 gm); Low Potassium (Less than 3000 mg/day)      PHYSICAL EXAM:      Patient Vitals for the past 24 hrs:   BP Temp Temp src Pulse Resp SpO2 Weight   01/12/22 2200 138/66 98.7 °F (37.1 °C) Oral 74 18 98 % --   01/12/22 1730 136/73 98 °F (36.7 °C) Oral 88 18 98 % --   01/12/22 1655 (!) 149/84 -- -- 67 18 -- --   01/12/22 1630 138/89 -- -- 87 -- -- --   01/12/22 1600 134/85 -- -- 91 -- -- --   01/12/22 1530 (!) 166/79 -- -- 59 -- -- --   01/12/22 1500 134/79 -- -- 68 -- -- --   01/12/22 1430 (!) 180/83 -- -- 66 -- -- --   01/12/22 1400 (!) 144/76 -- -- 67 -- -- --   01/12/22 1330 (!) 162/78 -- -- 63 -- -- --   01/12/22 1307 (!) 156/74 -- -- 66 -- -- --   01/12/22 1255 (!) 156/74 97.7 °F (36.5 °C) -- 66 22 -- 242 lb 8.1 oz (110 kg)   01/12/22 1025 (!) 161/67 97.8 °F (36.6 °C) Oral 68 18 93 % --          Intake/Output Summary (Last 24 hours) at 1/13/2022 9839  Last data filed at 1/12/2022 1655  Gross per 24 hour   Intake --   Output 2900 ml   Net -2900 ml       Wt Readings from Last 3 Encounters:   01/12/22 242 lb 8.1 oz (110 kg)   01/07/22 256 lb (116.1 kg)   11/30/21 252 lb 8 oz (114.5 kg)       General: Age-appropriate obese white woman in no acute distress  HEENT: NC/AT, EOMI, sclera and conjunctiva are clear and anicteric.   Mucous membranes moist.  Cardiovascular: regular rate and rhythm, no murmurs, gallops, or rubs  Respiratory: Bilateral dry crackles throughout both lung fields, coarse upper airway noises, mildly prolonged expiratory phase though no wheeze  Gastrointestinal: soft, nontender, nondistended, NABS  Ext: Scant distal lower extremity edema   Neuro: awake, alert, oriented x3. Moves all 4 extremities. Cranial nerves II through XII grossly intact. Skin: dry, no rash      DATA:      Recent Labs     01/11/22  1245 01/12/22  0510 01/13/22  0438   WBC 4.5 2.4* 6.0   HGB 9.9* 10.1* 10.1*   HCT 31.9* 33.0* 31.9*   .9* 100.6* 98.2    180 212     Recent Labs     01/11/22  1245 01/12/22  0510 01/13/22  0438    131* 135   K 5.0 6.2* 4.5   CL 92* 91* 92*   CO2 18* 14* 22   BUN 66* 81* 41*   CREATININE 8.4* 9.4* 5.7*       Iron studies:  Lab Results   Component Value Date    FERRITIN 1,939 01/12/2022     Bone disease:  Lab Results   Component Value Date    MG 2.0 01/13/2022    PHOS 6.0 (H) 01/13/2022     Nutrition:No results found for: ALB    Microbiology:  Reviewed    DIALYSIS ORDERS:    Reviewed    ASSESSMENT / RECOMMENDATIONS:     1. ESRD  2. Anemia due to ESRD  3. 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 Hypertension  5. ZLALE-82 pneumonia, complicated by diarrhea        Continue IHD support for solute and volume clearance.    Plan to resume usual TTS schedule  today  JHONATAN --Retacrit while here  Continue other aspects of renal management  Continue supportive care    Abdulaziz Rosales MD, MD  1/13/2022

## 2022-01-13 NOTE — PLAN OF CARE
Problem: Falls - Risk of:  Goal: Will remain free from falls  Description: Will remain free from falls  1/13/2022 1621 by Peace Heaton RN  Outcome: Met This Shift  1/13/2022 1621 by Peace Heaton RN  Outcome: Met This Shift     Problem: Falls - Risk of:  Goal: Absence of physical injury  Description: Absence of physical injury  1/13/2022 1621 by Peace Heaton RN  Outcome: Met This Shift  1/13/2022 1621 by Peace Heaton RN  Outcome: Met This Shift

## 2022-01-13 NOTE — PROGRESS NOTES
The patient is currently, nauseous, dry heaving and not feeling well. She doesn't want her one time remdesiver  Right now because she is too nauseous. Called BRITTANY Peng and got the okay to retime the remdesiver for 9am and got new orders he put in for Ysitie 6.

## 2022-01-13 NOTE — PROGRESS NOTES
Objective:    BP (!) 155/48   Pulse 69   Temp 102.3 °F (39.1 °C)   Resp 20   Ht 5' 4\" (1.626 m)   Wt 245 lb 6 oz (111.3 kg)   SpO2 94%   BMI 42.12 kg/m²     General Appearance: alert and oriented to person, place and time and in no acute distress  Skin: warm and dry  Head: normocephalic and atraumatic  Eyes: pupils equal, round, and reactive to light, extraocular eye movements intact, conjunctivae normal  Neck: neck supple and non tender without mass   Pulmonary/Chest: clear to auscultation bilaterally- no wheezes, rales or rhonchi, normal air movement, no respiratory distress  Cardiovascular: normal rate, normal S1 and S2 and no carotid bruits  Abdomen: soft, non-tender, non-distended, normal bowel sounds, no masses or organomegaly  Extremities: no cyanosis, no clubbing and no edema  Neurologic: no cranial nerve deficit and speech normal        Recent Labs     01/11/22  1245 01/11/22  1245 01/11/22  1437 01/12/22  0510 01/13/22  0438     --   --  131* 135   K 5.0  --   --  6.2* 4.5   CL 92*  --   --  91* 92*   CO2 18*  --   --  14* 22   BUN 66*  --   --  81* 41*   CREATININE 8.4*  --   --  9.4* 5.7*   GLUCOSE 145*   < > 145 262* 215*   CALCIUM 7.4*  --   --  7.2* 8.1*    < > = values in this interval not displayed. Recent Labs     01/11/22  1245 01/12/22  0510 01/13/22  0438   WBC 4.5 2.4* 6.0   RBC 3.13* 3.28* 3.25*   HGB 9.9* 10.1* 10.1*   HCT 31.9* 33.0* 31.9*   .9* 100.6* 98.2   MCH 31.6 30.8 31.1   MCHC 31.0* 30.6* 31.7*   RDW 14.5 14.4 14.4    180 212   MPV 10.4 11.0 11.4       Radiology: None    Assessment:    Active Problems:    Pneumonia due to COVID-19 virus  Resolved Problems:    * No resolved hospital problems. *      Plan:    1. Acute hypoxic respiratory failure 2/2 covid with superimposed bacterial infection - Patient is currently on 2 L NC. Continue 7 days of rocephin and azithromycin. Continue vitamin cocktail. Patient is already on decadron and remdesiviri.  Not a candidate for baricitinib due to ESRD. 2. DM-II - Place on home dose of glargine 25 u BID and continue low dose insulin SS.   3. ESRD on HD - Follow up with nephrology  4. HTN - Continue coreg  5. HLD - Continue lipitor  6. DVT prophylaxis - Heparin     Follow up with home oxygen eval. Plan to discharge home. NOTE: This report was transcribed using voice recognition software. Every effort was made to ensure accuracy; however, inadvertent computerized transcription errors may be present.     Electronically signed by Caity Tolliver DO on 1/13/2022 at 9:46 AM

## 2022-01-13 NOTE — PROGRESS NOTES
Comprehensive Nutrition Assessment    Type and Reason for Visit:  Initial,Positive Nutrition Screen    Nutrition Recommendations/Plan: Continue current diet. Nutrition Assessment:  Pt came into ED with dyspnea and cough worsened by exertion, nausea, diarrhea without abdominal pain. Pt adm with COVID +PNA. Pt stated prior to adm had poor appetite and PO ~4-5days. PMHx of ESRD on HD and DM. Malnutrition Assessment:  Malnutrition Status:  Insufficient data    Context:  Acute Illness     Findings of the 6 clinical characteristics of malnutrition:  Energy Intake:  No significant decrease in energy intake  Weight Loss:  Unable to assess     Body Fat Loss:  Unable to assess (COVID isolation)     Muscle Mass Loss:  Unable to assess (COVID isolation)    Fluid Accumulation:  Unable to assess     Strength:  Not Performed    Estimated Daily Nutrient Needs:  Energy (kcal):  1000-1700kcal (8-15kcal/kg CBW); Weight Used for Energy Requirements:  Current     Protein (g):  70-80g (1.2-1.4g/kg IBW); Weight Used for Protein Requirements:  Ideal        Fluid (ml/day):  3514-3363; Method Used for Fluid Requirements:  1 ml/kcal      Nutrition Related Findings:  A&Ox4, BS active, Abd soft, -I/Os, Edema +1 non pitting BUE and +2 pitting      Wounds:  None       Current Nutrition Therapies:    ADULT DIET; Regular; 4 carb choices (60 gm/meal); Low Sodium (2 gm);  Low Potassium (Less than 3000 mg/day)    Anthropometric Measures:  · Height: 5' 4\" (162.6 cm)  · Current Body Weight: 245 lb 6 oz (111.3 kg) (1/13)       · Usual Body Weight: 247 lb 12.8 oz (112.4 kg) (11/18/21 bed)     · Ideal Body Weight: 120 lbs; % Ideal Body Weight 204.5 %   · BMI: 42.1  · Adjusted Body Weight:  ; No Adjustment     · BMI Categories: Obese Class 3 (BMI 40.0 or greater)       Nutrition Diagnosis:   · Inadequate oral intake related to impaired respiratory function (COVID +PNA) as evidenced by intake 26-50%,poor intake prior to admission    Nutrition Interventions:   Food and/or Nutrient Delivery:  Continue Current Diet  Nutrition Education/Counseling:  No recommendation at this time   Coordination of Nutrition Care:  Continue to monitor while inpatient    Goals:  >50% of meals consumed       Nutrition Monitoring and Evaluation:   Behavioral-Environmental Outcomes:  None Identified   Food/Nutrient Intake Outcomes:  Food and Nutrient Intake  Physical Signs/Symptoms Outcomes:  Biochemical Data,Fluid Status or Edema,Hemodynamic Status,Nutrition Focused Physical Findings,Skin,Weight     Discharge Planning:     Too soon to determine     Electronically signed by Molly Almodovra RD, LD on 1/13/22 at 10:32 AM EST    Contact: [phone]

## 2022-01-13 NOTE — CONSULTS
[facility] MD Sabrina Mir MD Marry Nettles, MD Earlyne Atta, MD Carlena Riding, YOSHI Mcguire  Consultation  Patient's Name: Cathleen Damon  2022    Nephrologist: Sabrina Palumbo MD    Reason for Consult: ESRD management  Requesting Physician:  No primary care provider on file. Chief Complaint: Dyspnea, diarrhea    History Obtained From: Patient, chart    History of Present Ilness:    Mrs. Lynne Orellana is a pleasant  68 y.o. woman known to me from outpatient practice for follow her for ESRD. She undergoes chronic intermittent hemodialysis on  and  at the [facility]. She was diagnosed with COVID last week. She had a cough and tickle in her throat, no fever, then subsequently developed fatigue, malaise, myalgias, cephalgia, then diarrhea which has been profuse for the 2 days preceding her presentation to the ER. She became dyspneic on the progress. Intermittent wheezing, as well. No headache or lightheadedness. Dysuria hematuria. No melena or hematochezia. No recent constipation. As condition worsen, she came to the ER. CT scan was consistent with COVID-19 pneumonia. COVID-19 testing was positive.     With Imodium diarrhea has improved. With supplemental oxygen, dyspnea has resolved.     Dialysis today for solute and volume clearance without event. BP stable throughout treatment.   A/R:        Past Medical History:   Diagnosis Date    Brain aneurysm     CLABSI (central line-associated bloodstream infection) 2021    [facility] mellitus [facility] [address]     ESRD on hemodialysis ([facility] [address] 2021    H/O heart artery stent     Hyperlipidemia     Hypertension        Past Surgical History:   Procedure Laterality Date    CARDIAC SURGERY       SECTION      UPPER GASTROINTESTINAL ENDOSCOPY N/A 2021    EGD BIOPSY performed by Damian Munguia MD at [facility] N/A 11/24/2021    CATHETER INSERTION  TUNNELED HEMODIALYSIS, WITH REMOVAL OF TEMPORARY CATHETER performed by Galen Garcia MD at [facility] OR       Family History   Problem Relation Age of Onset    Coronary Art Dis Mother     Coronary Art Dis Father     Coronary Art Dis Brother         reports that she quit smoking about 4 years ago. She smoked 1.00 pack per day. She has never used smokeless tobacco. She reports that she does not drink alcohol and does not use drugs.     Allergies:  Morphine, Other, and Pcn [penicillins]    Current Medications:    trimethobenzamide (TIGAN) injection 200 mg, Q6H PRN  epoetin sadia-epbx (RETACRIT) injection 3,320 Units, Once per day on Mon Wed Fri  remdesivir 200 mg in sodium chloride 0.9 % 250 mL IVPB, Once  [START ON 1/14/2022] remdesivir 100 mg in sodium chloride 0.9 % 250 mL IVPB, Q24H  sodium chloride flush 0.9 % injection 5-40 mL, 2 times per day  sodium chloride flush 0.9 % injection 5-40 mL, PRN  0.9 % sodium chloride infusion, PRN  ondansetron (ZOFRAN-ODT) disintegrating tablet 4 mg, Q8H PRN   Or  ondansetron (ZOFRAN) injection 4 mg, Q6H PRN  acetaminophen (TYLENOL) tablet 650 mg, Q6H PRN   Or  acetaminophen (TYLENOL) suppository 650 mg, Q6H PRN  pantoprazole (PROTONIX) tablet 40 mg, QAM AC  heparin (porcine) injection 5,000 Units, 3 times per day  guaiFENesin-dextromethorphan (ROBITUSSIN DM) 100-10 MG/5ML syrup 5 mL, Q4H PRN  dexamethasone (DECADRON) tablet 6 mg, Daily  Vitamin D (CHOLECALCIFEROL) tablet 6,000 Units, Daily   Followed by  Richard Morales ON 1/18/2022] Vitamin D (CHOLECALCIFEROL) tablet 2,000 Units, Daily  ascorbic acid (VITAMIN C) tablet 500 mg, TID  zinc sulfate (ZINCATE) capsule 50 mg, Daily  cefTRIAXone (ROCEPHIN) 1,000 mg in sodium chloride flush 10 mL IV syringe, Q24H  azithromycin (ZITHROMAX) 500 mg in D5W 250ml addavial, Q24H  aspirin EC tablet 81 mg, Daily  atorvastatin (LIPITOR) tablet 40 mg, Daily  carvedilol (COREG) tablet 12.5 mg, BID WC  clopidogrel (PLAVIX) tablet 75 mg, Daily  insulin glargine (LANTUS) injection vial 25 Units, Daily  insulin lispro (HUMALOG) injection vial 0-6 Units, TID WC  insulin lispro (HUMALOG) injection vial 0-3 Units, Nightly  0.9 % sodium chloride bolus, PRN        Review of Systems:   A comprehensive review of systems was negative. Except as noted above    Physical exam:   Vital signs reviewed  General: Age-appropriate morbidly obese white woman in no acute distress  HEENT: NC/AT, EOMI, sclera and conjunctiva are clear and anicteric. Mucous membranes moist.  Cardiovascular: regular rate and rhythm, no murmur  Respiratory: Bilateral dry crackles throughout both lung fields, coarse upper airway noises, mildly prolonged expiratory phase though no wheeze  Gastrointestinal: soft, nontender, nondistended, NABS  Ext: Scant distal lower extremity edema   Neuro: awake, alert, oriented x3. Moves all 4 extremities. Cranial nerves II through XII grossly intact. Skin: dry, no rash    Data:   Laboratory studies:  Reviewed    Imaging:  CTA PULMONARY W CONTRAST   Final Result   1. No pulmonary embolism. 2. Mild peripheral pulmonary infiltrates consistent with COVID-19 pneumonia. 3. Thyroid nodules, with the largest on the right measuring 2.5 cm. Per the   recommendations of the [employer] of [employer]iology, imaging follow-up with   sonography is recommended. RECOMMENDATIONS:   Unavailable         XR CHEST PORTABLE   Final Result   Cardiomegaly. Nothing active. Assessment  1. ESRD  2. Anemia due to ESRD  3. 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 Hypertension  5. NZVAV-70 pneumonia, complicated by diarrhea        Continue IHD support for solute and volume clearance.    Plan to resume usual TTS schedule tomorrow, they will evaluate in a.m. as to what to do with her schedule while here  JHONATAN  Continue other aspects of renal management  Continue supportive care      Thank you for the opportunity to participate in the care of your pleasant patient. We look forward to following along with you.         Electronically signed by Trung Li MD on 1/12/2022

## 2022-01-14 VITALS
HEIGHT: 64 IN | HEART RATE: 60 BPM | SYSTOLIC BLOOD PRESSURE: 97 MMHG | RESPIRATION RATE: 17 BRPM | DIASTOLIC BLOOD PRESSURE: 61 MMHG | BODY MASS INDEX: 41.25 KG/M2 | OXYGEN SATURATION: 95 % | TEMPERATURE: 97.4 F | WEIGHT: 241.62 LBS

## 2022-01-14 LAB
ALBUMIN SERPL-MCNC: 3.8 G/DL (ref 3.5–5.2)
ALP BLD-CCNC: 75 U/L (ref 35–104)
ALT SERPL-CCNC: <5 U/L (ref 0–32)
ANION GAP SERPL CALCULATED.3IONS-SCNC: 21 MMOL/L (ref 7–16)
AST SERPL-CCNC: 25 U/L (ref 0–31)
BASOPHILS ABSOLUTE: 0 E9/L (ref 0–0.2)
BASOPHILS RELATIVE PERCENT: 0 % (ref 0–2)
BILIRUB SERPL-MCNC: 0.2 MG/DL (ref 0–1.2)
BUN BLDV-MCNC: 36 MG/DL (ref 6–23)
CALCIUM SERPL-MCNC: 8.1 MG/DL (ref 8.6–10.2)
CHLORIDE BLD-SCNC: 92 MMOL/L (ref 98–107)
CO2: 24 MMOL/L (ref 22–29)
CREAT SERPL-MCNC: 4.8 MG/DL (ref 0.5–1)
EOSINOPHILS ABSOLUTE: 0 E9/L (ref 0.05–0.5)
EOSINOPHILS RELATIVE PERCENT: 0 % (ref 0–6)
GFR AFRICAN AMERICAN: 11
GFR NON-AFRICAN AMERICAN: 9 ML/MIN/1.73
GLUCOSE BLD-MCNC: 273 MG/DL (ref 74–99)
HCT VFR BLD CALC: 32 % (ref 34–48)
HEMOGLOBIN: 10.1 G/DL (ref 11.5–15.5)
IMMATURE GRANULOCYTES #: 0.01 E9/L
IMMATURE GRANULOCYTES %: 0.4 % (ref 0–5)
LYMPHOCYTES ABSOLUTE: 0.39 E9/L (ref 1.5–4)
LYMPHOCYTES RELATIVE PERCENT: 14.2 % (ref 20–42)
MAGNESIUM: 2.2 MG/DL (ref 1.6–2.6)
MCH RBC QN AUTO: 31.6 PG (ref 26–35)
MCHC RBC AUTO-ENTMCNC: 31.6 % (ref 32–34.5)
MCV RBC AUTO: 100 FL (ref 80–99.9)
METER GLUCOSE: 209 MG/DL (ref 74–99)
METER GLUCOSE: 276 MG/DL (ref 74–99)
METER GLUCOSE: 288 MG/DL (ref 74–99)
MONOCYTES ABSOLUTE: 0.35 E9/L (ref 0.1–0.95)
MONOCYTES RELATIVE PERCENT: 12.7 % (ref 2–12)
NEUTROPHILS ABSOLUTE: 2 E9/L (ref 1.8–7.3)
NEUTROPHILS RELATIVE PERCENT: 72.7 % (ref 43–80)
PDW BLD-RTO: 14.3 FL (ref 11.5–15)
PHOSPHORUS: 6.8 MG/DL (ref 2.5–4.5)
PLATELET # BLD: 177 E9/L (ref 130–450)
PMV BLD AUTO: 10.7 FL (ref 7–12)
POLYCHROMASIA: ABNORMAL
POTASSIUM REFLEX MAGNESIUM: 4.5 MMOL/L (ref 3.5–5)
POTASSIUM SERPL-SCNC: 4.5 MMOL/L (ref 3.5–5)
RBC # BLD: 3.2 E12/L (ref 3.5–5.5)
SODIUM BLD-SCNC: 137 MMOL/L (ref 132–146)
TOTAL PROTEIN: 7.4 G/DL (ref 6.4–8.3)
WBC # BLD: 2.8 E9/L (ref 4.5–11.5)

## 2022-01-14 PROCEDURE — 83735 ASSAY OF MAGNESIUM: CPT

## 2022-01-14 PROCEDURE — 6370000000 HC RX 637 (ALT 250 FOR IP): Performed by: INTERNAL MEDICINE

## 2022-01-14 PROCEDURE — 97161 PT EVAL LOW COMPLEX 20 MIN: CPT

## 2022-01-14 PROCEDURE — 6370000000 HC RX 637 (ALT 250 FOR IP): Performed by: NURSE PRACTITIONER

## 2022-01-14 PROCEDURE — 99239 HOSP IP/OBS DSCHRG MGMT >30: CPT | Performed by: INTERNAL MEDICINE

## 2022-01-14 PROCEDURE — 2580000003 HC RX 258: Performed by: NURSE PRACTITIONER

## 2022-01-14 PROCEDURE — 2580000003 HC RX 258: Performed by: FAMILY MEDICINE

## 2022-01-14 PROCEDURE — 90935 HEMODIALYSIS ONE EVALUATION: CPT

## 2022-01-14 PROCEDURE — 80053 COMPREHEN METABOLIC PANEL: CPT

## 2022-01-14 PROCEDURE — 84100 ASSAY OF PHOSPHORUS: CPT

## 2022-01-14 PROCEDURE — 36415 COLL VENOUS BLD VENIPUNCTURE: CPT

## 2022-01-14 PROCEDURE — 85025 COMPLETE CBC W/AUTO DIFF WBC: CPT

## 2022-01-14 PROCEDURE — 2700000000 HC OXYGEN THERAPY PER DAY

## 2022-01-14 PROCEDURE — 87070 CULTURE OTHR SPECIMN AEROBIC: CPT

## 2022-01-14 PROCEDURE — 82962 GLUCOSE BLOOD TEST: CPT

## 2022-01-14 PROCEDURE — 97165 OT EVAL LOW COMPLEX 30 MIN: CPT

## 2022-01-14 PROCEDURE — 2500000003 HC RX 250 WO HCPCS: Performed by: FAMILY MEDICINE

## 2022-01-14 PROCEDURE — 87206 SMEAR FLUORESCENT/ACID STAI: CPT

## 2022-01-14 PROCEDURE — 80048 BASIC METABOLIC PNL TOTAL CA: CPT

## 2022-01-14 PROCEDURE — 6360000002 HC RX W HCPCS: Performed by: NURSE PRACTITIONER

## 2022-01-14 RX ORDER — DEXAMETHASONE 0.5 MG/1
6 TABLET ORAL
Qty: 84 TABLET | Refills: 0 | Status: SHIPPED | OUTPATIENT
Start: 2022-01-14 | End: 2022-01-21

## 2022-01-14 RX ORDER — LEVOFLOXACIN 500 MG/1
500 TABLET, FILM COATED ORAL
Qty: 2 TABLET | Refills: 0 | Status: SHIPPED | OUTPATIENT
Start: 2022-01-14 | End: 2022-01-18

## 2022-01-14 RX ADMIN — INSULIN GLARGINE 25 UNITS: 100 INJECTION, SOLUTION SUBCUTANEOUS at 12:10

## 2022-01-14 RX ADMIN — Medication 6000 UNITS: at 08:19

## 2022-01-14 RX ADMIN — ACETAMINOPHEN 650 MG: 325 TABLET ORAL at 04:01

## 2022-01-14 RX ADMIN — OXYCODONE HYDROCHLORIDE AND ACETAMINOPHEN 500 MG: 500 TABLET ORAL at 14:39

## 2022-01-14 RX ADMIN — PANTOPRAZOLE SODIUM 40 MG: 40 TABLET, DELAYED RELEASE ORAL at 06:30

## 2022-01-14 RX ADMIN — ZINC SULFATE 220 MG (50 MG) CAPSULE 50 MG: CAPSULE at 08:16

## 2022-01-14 RX ADMIN — ASPIRIN 81 MG: 81 TABLET, COATED ORAL at 08:19

## 2022-01-14 RX ADMIN — CARVEDILOL 12.5 MG: 6.25 TABLET, FILM COATED ORAL at 08:16

## 2022-01-14 RX ADMIN — INSULIN LISPRO 3 UNITS: 100 INJECTION, SOLUTION INTRAVENOUS; SUBCUTANEOUS at 12:10

## 2022-01-14 RX ADMIN — REMDESIVIR 100 MG: 100 INJECTION, POWDER, LYOPHILIZED, FOR SOLUTION INTRAVENOUS at 09:10

## 2022-01-14 RX ADMIN — ACETAMINOPHEN 650 MG: 325 TABLET ORAL at 11:33

## 2022-01-14 RX ADMIN — CLOPIDOGREL BISULFATE 75 MG: 75 TABLET, FILM COATED ORAL at 08:16

## 2022-01-14 RX ADMIN — HEPARIN SODIUM 5000 UNITS: 10000 INJECTION INTRAVENOUS; SUBCUTANEOUS at 06:36

## 2022-01-14 RX ADMIN — ATORVASTATIN CALCIUM 40 MG: 40 TABLET, FILM COATED ORAL at 08:19

## 2022-01-14 RX ADMIN — OXYCODONE HYDROCHLORIDE AND ACETAMINOPHEN 500 MG: 500 TABLET ORAL at 08:19

## 2022-01-14 RX ADMIN — DEXAMETHASONE 6 MG: 4 TABLET ORAL at 08:19

## 2022-01-14 RX ADMIN — INSULIN LISPRO 3 UNITS: 100 INJECTION, SOLUTION INTRAVENOUS; SUBCUTANEOUS at 06:37

## 2022-01-14 RX ADMIN — Medication 10 ML: at 08:20

## 2022-01-14 RX ADMIN — ONDANSETRON 4 MG: 2 INJECTION INTRAMUSCULAR; INTRAVENOUS at 18:07

## 2022-01-14 ASSESSMENT — PAIN SCALES - GENERAL
PAINLEVEL_OUTOF10: 0
PAINLEVEL_OUTOF10: 5
PAINLEVEL_OUTOF10: 0
PAINLEVEL_OUTOF10: 5
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
## 2022-01-14 NOTE — DISCHARGE SUMMARY
[facility] Physician Discharge Summary       [facility]  Sheng Dejesus. [facility] [address]  [address] [place] [address]. Due to your Covid + status, your outpt hemo schedule is different temporarily. The first time you can go to [facility]  for hemo is Monday 1/17/22. JUDY Johnson at [facility] said for you to call MOnday morning to get a time. Kerry Guy MD  [address] [phone]    Schedule an appointment as soon as possible for a visit  For follow-up    [facility] Pre-Service  [phone]  Schedule an appointment as soon as possible for a visit  For follow-up      Activity level: As tolerated     Dispo: Home      Condition on discharge: Stable     Patient ID:  Leo Kelley  [id number]  89 y.o.  1955    Admit date: 1/11/2022    Discharge date and time:  1/14/2022  3:40 PM    Admission Diagnoses: Active Problems:    Pneumonia due to COVID-19 virus  Resolved Problems:    * No resolved hospital problems. *      Discharge Diagnoses: Active Problems:    Pneumonia due to COVID-19 virus  Resolved Problems:    * No resolved hospital problems. *      Consults:  IP CONSULT TO NEPHROLOGY  IP CONSULT TO PHARMACY  IP CONSULT TO PHARMACY    Procedures: None    Hospital Course:   Patient Leo Kelley is a 73 y.o. presented with Periumbilical abdominal pain [R10.33]  Diarrhea, unspecified type [R19.7]  Stage 5 chronic kidney disease on chronic dialysis ([facility] [address] [N18.6, Z99.2]  Pneumonia due to COVID-19 virus [U07.1, ]    73year old female presents to the hospital with covid 19 pneumonia as well as superimposed bacterial pneumonia. Patient was treated with decadron and remdesivir. Patient has been started on antibiotics. She is now off of oxygen. She is medically stable for discharge.      Discharge Exam:    General Appearance: alert and oriented to person, place and time and in no acute distress  Skin: warm and dry  Head: normocephalic and atraumatic  Eyes: pupils equal, round, and reactive to light, extraocular eye movements intact, conjunctivae normal  Neck: neck supple and non tender without mass   Pulmonary/Chest: clear to auscultation bilaterally- no wheezes, rales or rhonchi, normal air movement, no respiratory distress  Cardiovascular: normal rate, normal S1 and S2 and no carotid bruits  Abdomen: soft, non-tender, non-distended, normal bowel sounds, no masses or organomegaly  Extremities: no cyanosis, no clubbing and no edema  Neurologic: no cranial nerve deficit and speech normal    I/O last 3 completed shifts:  In: -   Out: 2000   No intake/output data recorded. LABS:  Recent Labs     01/12/22 0510 01/12/22 0510 01/13/22 0438 01/14/22  0345   *  --  135 137   K 6.2*   < > 4.5  4.5 4.5  4.5   CL 91*  --  92* 92*   CO2 14*  --  22 24   BUN 81*  --  41* 36*   CREATININE 9.4*  --  5.7* 4.8*   GLUCOSE 262*  --  215* 273*   CALCIUM 7.2*  --  8.1* 8.1*    < > = values in this interval not displayed. Recent Labs     01/12/22 0510 01/13/22 0438 01/14/22  0345   WBC 2.4* 6.0 2.8*   RBC 3.28* 3.25* 3.20*   HGB 10.1* 10.1* 10.1*   HCT 33.0* 31.9* 32.0*   .6* 98.2 100.0*   MCH 30.8 31.1 31.6   MCHC 30.6* 31.7* 31.6*   RDW 14.4 14.4 14.3    212 177   MPV 11.0 11.4 10.7       No results for input(s): POCGLU in the last 72 hours. Imaging:  XR CHEST PORTABLE    Result Date: 1/11/2022  EXAMINATION: ONE XRAY VIEW OF THE CHEST 1/11/2022 11:43 am COMPARISON: 30 November 2021 HISTORY: ORDERING SYSTEM PROVIDED HISTORY: palpitations TECHNOLOGIST PROVIDED HISTORY: Reason for exam:->palpitations FINDINGS: Indwelling dialysis catheter on the right terminating in the SVC. Heart is enlarged. Pulmonary vascularity is normal.  Lungs are clear. Neither costophrenic angle is blunted. Cardiomegaly. Nothing active.      CTA PULMONARY W CONTRAST    Result Date: 1/11/2022  EXAMINATION: CTA OF THE CHEST 1/11/2022 5:17 pm TECHNIQUE: CTA of the chest was performed after the administration of intravenous contrast.  Multiplanar reformatted images are provided for review. MIP images are provided for review. Dose modulation, iterative reconstruction, and/or weight based adjustment of the mA/kV was utilized to reduce the radiation dose to as low as reasonably achievable. COMPARISON: None. HISTORY: ORDERING SYSTEM PROVIDED HISTORY: elevated dimer with sob COVID + TECHNOLOGIST PROVIDED HISTORY: Reason for exam:->elevated dimer with sob COVID + Decision Support Exception - unselect if not a suspected or confirmed emergency medical condition->Emergency Medical Condition (MA) FINDINGS: Pulmonary Arteries: Pulmonary arteries are adequately opacified for evaluation. No evidence of intraluminal filling defect to suggest pulmonary embolism. Main pulmonary artery is normal in caliber. Mediastinum: The heart is mildly enlarged. Prominent calcified atherosclerosis seen in the coronary arteries. Aorta is normal in caliber without acute abnormality. Mid mildly prominent lymph nodes in the mediastinal and marilin are likely reactive. Multiple thyroid nodule seen, with the largest 2.5 cm. Lungs/pleura: Predominantly peripheral pulmonary infiltrates in the lungs, most notably in the lower lobes, are consistent with pneumonia, likely COVID 19. The central airway is clear. No pneumothorax or pleural effusion is seen. Upper Abdomen: Limited images of the upper abdomen are unremarkable. Soft Tissues/Bones: No acute bone or soft tissue abnormality. 1. No pulmonary embolism. 2. Mild peripheral pulmonary infiltrates consistent with COVID-19 pneumonia. 3. Thyroid nodules, with the largest on the right measuring 2.5 cm. Per the recommendations of the [employer] of Radiology, imaging follow-up with sonography is recommended.  RECOMMENDATIONS: Unavailable       Patient Instructions:      Medication List      START taking these medications dexamethasone 0.5 MG tablet  Commonly known as: DECADRON  Take 12 tablets by mouth daily (with breakfast) for 7 days     levoFLOXacin 500 MG tablet  Commonly known as: LEVAQUIN  Take 1 tablet by mouth every 48 hours for 4 days        CONTINUE taking these medications    aspirin 81 MG EC tablet     atorvastatin 40 MG tablet  Commonly known as: LIPITOR     carvedilol 12.5 MG tablet  Commonly known as: COREG     clopidogrel 75 MG tablet  Commonly known as: PLAVIX     NovoLOG 100 UNIT/ML injection vial  Generic drug: insulin aspart     pantoprazole 40 MG tablet  Commonly known as: PROTONIX  Take 1 tablet by mouth every morning (before breakfast)     Touscarlett Max SoloStar 300 UNIT/ML Sopn  Generic drug: Insulin Glargine (2 Unit Dial)           Where to Get Your Medications      You can get these medications from any pharmacy    Bring a paper prescription for each of these medications  · dexamethasone 0.5 MG tablet  · levoFLOXacin 500 MG tablet           Note that 34 minutes was spent in preparing discharge papers, discussing discharge with patient, medication review, etc.    Signed:  Electronically signed by Hemalatha Rutherford DO on 1/14/2022 at 3:40 PM

## 2022-01-14 NOTE — PROGRESS NOTES
Dr. Celina Giron will be in after 4 to round. Pt to receive short treatment today due to change of op schedule. 20-Jun-2018 16:39

## 2022-01-14 NOTE — PROGRESS NOTES
transfers/mobility and ADLs  * Therapeutic exercise to improve motor endurance, ROM, and functional strength for ADLs/functional transfers  * Therapeutic activities to facilitate/challenge dynamic balance, stand tolerance for increased safety and independence with ADLs  * Therapeutic activities to facilitate gross/fine motor skills for increased independence with ADLs        Recommended Adaptive Equipment: TBD     Home Living: Pt lives with daughter in 1 story house with 1 SHIRA. Bathroom setup: tub/shower with grab bars, standard height commode   Equipment owned: wheeled walker    Prior Level of Function: independent with ADLs , assist from daughter with IADLs; functional mobility: wheeled walker      Pain: pt reported no pain this date  Cognition: A&O: 4/4; WFL command follow demonstrated.  Pt pleasant, talkative, and agreeable to therapy   Memory:  WFL   Sequencing:  WFL   Problem solving:  WFL   Judgement/safety:  WFL     Functional Assessment:  AM-PAC Daily Activity Raw Score: 19/24   Initial Eval Status  Date: 1/14/22 Treatment Status  Date: STGs = LTGs  Time frame: 10-14 days   Feeding Independent     Grooming SBA/Set up  I   UB Dressing Min A  Mod I/I   LB Dressing Min A for socks and simulated pants  Mod I/I-with use of AD as appropriate/needed   Bathing Min A  Mod I/I -with use of AD as appropriate/needed   Toileting Min A  I    Bed Mobility  Pt in chair      Functional Transfers SBA with wheeled walker  Sit<>stand from EOB  Cues for hand placement  I    Functional Mobility SBA with wheeled walker  Short distance in room  I -with device as needed to maximize independence with ADLs and functional task completion   Balance Sitting:     Static:  I    Dynamic:Sup  Standing: SBA with wheeled walker  I for dynamic sitting balance to maximize independence with ADLs and functional task completion    I for standing balance to maximize independence with ADLs and functional task completion   Activity Tolerance Fair- with light activity. Pt's SpO2 at start of session was 93% on room air. Pt walked short distance and reported feeling \"funny. \" Pt returned to chair and BP and SpO2 checked. Pt's BP was 104/62 and SpO2 was 85%. Pt improved to 94% with seated rest break and cues for pursed lip breathing within a minute. RN notified. Pt educated on use of incentive spirometer and demonstrated good understanding. Fair+ with ADL activity. Pt will demonstrate good understanding of education provided on EC/WS techniques    Visual/  Perceptual Glasses: none at bedside; pt has cataracts                Additional long-term goal: Pt will increase functional independence to PLOF to allow pt to live in least restrictive environment. Hand Dominance R   AROM (PROM) Strength   RUE  Shoulder Flexion: ~100  Distally WFL 3-/5   LUE WFL 4-/5     Hearing: WFL   Sensation:  No c/o numbness or tingling   Tone: WFL   Edema: none noted    Comments: Upon arrival patient sitting in chair. At end of session, patient returned to chair with call light and phone within reach, all lines and tubes intact. Overall patient demonstrated  decreased independence and safety during completion of ADL/functional transfer/mobility tasks. Pt would benefit from continued skilled OT to increase safety and independence with completion of ADL/IADL tasks for functional independence and quality of life. Rehab Potential: Good for established goals     Patient / Family Goal: to return home    Patient and/or family were instructed on functional diagnosis, prognosis/goals and OT plan of care. Demonstrated good understanding.      Eval Complexity: Low    Time In: 1348  Time Out: 1409    Min Units   OT Eval Low 97165  x  1   OT Eval Medium 13398      OT Eval High 69601      OT Re-Eval [id number]       Therapeutic Ex 24859       Therapeutic Activities 13159       ADL/Self Care 74322       Orthotic Management 79940       Manual 96650     Neuro Re-Ed 26220       Non-Billable Time Evaluation Time additionally includes thorough review of current medical information, gathering information on past medical history/social history and prior level of function, interpretation of standardized testing/informal observation of tasks, assessment of data and development of plan of care and goals.             Eliza Elias, OTR/L, [id number]

## 2022-01-14 NOTE — CARE COORDINATION
Social Work / Discharge Planning:    COVID positive 1/7/22. Plan to discharge home. Patient received dialysis at [facility] and because she is COVID positive her schedule will need to change until 10 days post COVID dx. The FIRST available day is Monday 1/17/22 3rd shift. Patient will need to call them Monday 1/17/22 in the AM to find out chair time. Patient's daughter transports patient to dialysis. Social work contacted patient and patient reports she has not been up out of bed since hospitalization because she is not feeling well. Patient declining [facility] and [facility]. Reports she lives with her daughter, Spike Lowe, who provides care. Patient reports normally she walks independently with no AD but does have a walker at home. Patient made aware she will need to call JENNIFER Bonds Monday morning for chair time. Patient reports daughter will be able to transport her home. JENNIFER Bonds (ph: [phone]) will need contacted when patient is discharged. Social work will continue to follow.  Electronically signed by Ary Severs, LSW on 1/14/22 at 9:51 AM EST

## 2022-01-14 NOTE — ACP (ADVANCE CARE PLANNING)
Advance Care Planning     Advance Care Planning Clinical Specialist  Conversation Note      Date of ACP Conversation: 1/14/2022    Conversation Conducted with: patient    ACP Clinical Specialist: Ron Hutchins RN        Healthcare Decision Maker:     Current Designated Healthcare Decision Maker:     Primary Decision Maker: Naif Matos - [phone]    Care Preferences    Hospitalization: \"If your health worsens and it becomes clear that your chance of recovery is unlikely, what would your preference be regarding hospitalization? \"    Choice:  [] The patient wants hospitalization. [] The patient prefers comfort-focused treatment without hospitalization. Ventilation: \"If you were in your present state of health and suddenly became very ill and were unable to breathe on your own, what would your preference be about the use of a ventilator (breathing machine) if it were available to you? \"      If the patient would desire the use of ventilator (breathing machine), answer \"yes\". If not, \"no\": yes    \"If your health worsens and it becomes clear that your chance of recovery is unlikely, what would your preference be about the use of a ventilator (breathing machine) if it were available to you? \"     Would the patient desire the use of ventilator (breathing machine)?: Yes      Resuscitation  \"CPR works best to restart the heart when there is a sudden event, like a heart attack, in someone who is otherwise healthy. Unfortunately, CPR does not typically restart the heart for people who have serious health conditions or who are very sick. \"    \"In the event your heart stopped as a result of an underlying serious health condition, would you want attempts to be made to restart your heart (answer \"yes\" for attempt to resuscitate) or would you prefer a natural death (answer \"no\" for do not attempt to resuscitate)? \" yes       [] Yes   [] No   Educated Patient / Jadiel Hark regarding differences between Advance Directives and portable DNR orders.     Length of ACP Conversation in minutes:  10    Conversation Outcomes:  [] ACP discussion completed  [x] Existing advance directive reviewed with patient; no changes to patient's previously recorded wishes  [] New Advance Directive completed  [] Portable Do Not Rescitate prepared for Provider review and signature  [] POLST/POST/MOLST/MOST prepared for Provider review and signature      Follow-up plan:    [] Schedule follow-up conversation to continue planning  [x] Referred individual to Provider for additional questions/concerns   [] Advised patient/agent/surrogate to review completed ACP document and update if needed with changes in condition, patient preferences or care setting    [] This note routed to one or more involved healthcare providers

## 2022-01-14 NOTE — PROGRESS NOTES
Physical Therapy    Facility/Department: [address] MED SURG/TELE  Initial Assessment    NAME: Kat Post  : 1955  MRN: [id number]    Date of Service: 2022      Attending Provider:  Alejandra Baez DO    Evaluating PT:  [place] Bae, P.T. Room #:  5737/4970-X  Diagnosis:  Periumbilical abdominal pain [R10.33]  Diarrhea, unspecified type [R19.7]  Stage 5 chronic kidney disease on chronic dialysis (HCC) [N18.6, Z99.2]  Pneumonia due to COVID-19 virus [U07.1, J12.82]  Pertinent PMHx/PSHx:  Brain aneursym  Precautions:  Droplet +    SUBJECTIVE:    Pt lives with daughter in a 1 story home with 1/2 step to enter. Pt ambulated with ww PTA. OBJECTIVE:   Initial Evaluation  Date: 22 Treatment Short Term/ Long Term   Goals   Was pt agreeable to Eval/treatment? yes     Does pt have pain? No c/o pain     Bed Mobility  NA, pt was found and left sitting up in chair    Independent   Transfers Sit to stand: supervision  Stand to sit: supervision  Stand pivot: NA  Independent   Ambulation   NA, pt c/o feeling woozy and weak  100 feet with ww supervision   Stair negotiation: ascended and descended NA  1 step with 1 rail SBA   AM-PAC 6 Clicks 46/07       BLE ROM is WFL. BLE strength is grossly 4-/5 to 4/5. Balance: sitting is Independent in chair and standing with ww is supervision   Endurance: fair-    ASSESSMENT:    Conditions Requiring Skilled Therapeutic Intervention:    [x]Decreased strength     []Decreased ROM  [x]Decreased functional mobility  [x]Decreased balance   [x]Decreased endurance   []Decreased posture  []Decreased sensation  []Decreased coordination   []Decreased vision  []Decreased safety awareness   []Increased pain     Comments:  Pt not feeling good today. C/o feeling woozy and weak with standing and is afraid of falling. Symptoms increased with time and eventually she had to sit down. Pt was left sitting up in chair with call light left by patient. Pt's/ family goals   1. To go home. Patient and or family understand(s) diagnosis, prognosis, and plan of care. PHYSICAL THERAPY PLAN OF CARE:    PT POC is established based on physician order and patient diagnosis     Referring provider/PT Order:  PT eval and treat  Diagnosis:  Periumbilical abdominal pain [R10.33]  Diarrhea, unspecified type [R19.7]  Stage 5 chronic kidney disease on chronic dialysis ([facility] [address] [N18.6, Z99.2]  Pneumonia due to COVID-19 virus [U07.1, J12.82]  Specific instructions for next treatment:  Increase amb as pt is able. Current Treatment Recommendations:     [x] Strengthening to improve independence with functional mobility   [] ROM to improve ROM and decrease spasm and pain which will help promote independence with functional mobility   [x] Balance Training to improve static/dynamic balance and to reduce fall risk  [x] Endurance Training to improve activity tolerance during functional mobility   [x] Transfer Training to improve safety and independence with all functional transfers   [x] Gait Training to improve gait mechanics, endurance and assess need for appropriate assistive device  [x] Stair Training in preparation for safe discharge home and/or into the community   [] Positioning to prevent skin breakdown and contractures  [] Safety and Education Training   [x] Patient/Caregiver Education   [] HEP  [] Other     PT long term treatment goals are located in above grid    Frequency of treatments: 2-5x/week x 1-2 weeks. Time in  14:30  Time out  14:45    Evaluation Time includes thorough review of current medical information, gathering information on past medical history/social history and prior level of function, completion of standardized testing/informal observation of tasks, assessment of data and education on plan of care and goals.     CPT codes:  [x] Low Complexity PT evaluation 84649  [] Moderate Complexity PT evaluation 25519  [] High Complexity PT evaluation 15821  [] PT Re-evaluation 65899  [] Gait training 18613 ** minutes  [] Manual therapy 33717 ** minutes  [] Therapeutic activities 03502 ** minutes  [] Therapeutic exercises 54239 ** minutes  [] Neuromuscular reeducation 65374 ** minutes     Pieter Wellington., P.T.   License Number: [id number]

## 2022-01-14 NOTE — PLAN OF CARE
Problem: Falls - Risk of:  Goal: Will remain free from falls  Description: Will remain free from falls  1/14/2022 1053 by Cristiana Payne RN  Outcome: Completed  Goal: Absence of physical injury  Description: Absence of physical injury  1/14/2022 1053 by Cristiana Payne RN  Outcome: Completed     Problem: Airway Clearance - Ineffective  Goal: Achieve or maintain patent airway  1/14/2022 1053 by Cristiana Payne RN  Outcome: Completed     Problem: Gas Exchange - Impaired  Goal: Absence of hypoxia  1/14/2022 1053 by Cristiana Payne RN  Outcome: Completed  Goal: Promote optimal lung function  1/14/2022 1053 by Cristiana Payne RN  Outcome: Completed     Problem: Breathing Pattern - Ineffective  Goal: Ability to achieve and maintain a regular respiratory rate  1/14/2022 1053 by Cristiana Payne RN  Outcome: Completed     Problem:  Body Temperature -  Risk of, Imbalanced  Goal: Ability to maintain a body temperature within defined limits  1/14/2022 1053 by Cristiana Payne RN  Outcome: Completed  Goal: Will regain or maintain usual level of consciousness  1/14/2022 1053 by Cristiana Payne RN  Outcome: Completed  Goal: Complications related to the disease process, condition or treatment will be avoided or minimized  1/14/2022 1053 by Cristiana Payne RN  Outcome: Completed     Problem: Isolation Precautions - Risk of Spread of Infection  Goal: Prevent transmission of infection  1/14/2022 1053 by Cristiana Payne RN  Outcome: Completed     Problem: Nutrition Deficits  Goal: Optimize nutritional status  1/14/2022 1053 by Cristiana Payne RN  Outcome: Completed     Problem: Risk for Fluid Volume Deficit  Goal: Maintain normal heart rhythm  1/14/2022 1053 by Cristiana Payne RN  Outcome: Completed  Goal: Maintain absence of muscle cramping  1/14/2022 1053 by Cristiana Payne RN  Outcome: Completed  Goal: Maintain normal serum potassium, sodium, calcium, phosphorus, and pH  1/14/2022 1053 by Cristiana Payne RN  Outcome: Completed     Problem: Loneliness or Risk for Loneliness  Goal: Demonstrate positive use of time alone when socialization is not possible  1/14/2022 1053 by Ramos Stanley RN  Outcome: Completed     Problem: Fatigue  Goal: Verbalize increase energy and improved vitality  1/14/2022 1053 by Ramos Stanley RN  Outcome: Completed     Problem: Patient Education: Go to Patient Education Activity  Goal: Patient/Family Education  1/14/2022 1053 by Ramos Stanley RN  Outcome: Completed     Problem: Pain:  Goal: Pain level will decrease  Description: Pain level will decrease  1/14/2022 1053 by aRmos Stanley RN  Outcome: Completed  Goal: Control of acute pain  Description: Control of acute pain  1/14/2022 1053 by Ramos Stanley RN  Outcome: Completed  Goal: Control of chronic pain  Description: Control of chronic pain  1/14/2022 1053 by Ramos Stanley RN  Outcome: Completed

## 2022-01-15 NOTE — PROGRESS NOTES
[facility] MD Thanh Tolbert MD Rance Pears, MD Jemal Galindo, CNP   Jaja Echavarria, YOSHI  Progress Note  1/14/2022    SUBJECTIVE:  We are following this patient for end-stage renal failure . 1/13: \"Rough night,\" as \"the antibiotics made me sick. \"  Nausea and vomiting multiple times overnight. No dyspnea on nasal cannula, respiratory status about the same. Ongoing nonproductive though wet sounding cough. No fever or chill. No chest pain or palpitations.   Anorexia    1/14: possible d/c later on today   It seems her HD schedule as outpt was altered due to COVID 19 +    PROBLEM LIST:    Patient Active Problem List   Diagnosis    Nausea & vomiting    MSSA bacteremia    CLABSI (central line-associated bloodstream infection)    ESRD on hemodialysis ([address]    Fever, unspecified    Essential hypertension    Hyperlipidemia    Diabetes mellitus type 2 with complications ([address]    Neck pain    Anemia in CKD (chronic kidney disease)    Pneumonia due to COVID-19 virus        PAST MEDICAL HISTORY:    Past Medical History:   Diagnosis Date    Brain aneurysm     CLABSI (central line-associated bloodstream infection) 11/19/2021    Diabetes mellitus ([address]     ESRD on hemodialysis ([facility] [address] 11/19/2021    H/O heart artery stent     Hyperlipidemia     Hypertension        MEDS (scheduled):   epoetin sadia-epbx  30 Units/kg SubCUTAneous Once per day on Tue Thu Sat    insulin glargine  25 Units SubCUTAneous BID    remdesivir IVPB  100 mg IntraVENous Q24H    sodium chloride flush  5-40 mL IntraVENous 2 times per day    pantoprazole  40 mg Oral QAM AC    heparin (porcine)  5,000 Units SubCUTAneous 3 times per day    dexamethasone  6 mg Oral Daily    Vitamin D  6,000 Units Oral Daily    Followed by   [facility] 1/18/2022] Vitamin D  2,000 Units Oral Daily    ascorbic acid  500 mg Oral TID    zinc sulfate  50 mg Oral Daily    cefTRIAXone (ROCEPHIN) IV  1,000 mg IntraVENous Q24H    azithromycin  500 mg IntraVENous Q24H    aspirin  81 mg Oral Daily    atorvastatin  40 mg Oral Daily    carvedilol  12.5 mg Oral BID     clopidogrel  75 mg Oral Daily    insulin lispro  0-6 Units SubCUTAneous TID     insulin lispro  0-3 Units SubCUTAneous Nightly       MEDS (infusions):   sodium chloride         MEDS (prn):  trimethobenzamide, sodium chloride flush, sodium chloride, ondansetron **OR** ondansetron, acetaminophen **OR** acetaminophen, guaiFENesin-dextromethorphan, sodium chloride    DIET:    ADULT DIET; Regular; 4 carb choices (60 gm/meal); Low Sodium (2 gm); Low Potassium (Less than 3000 mg/day)      PHYSICAL EXAM:      Patient Vitals for the past 24 hrs:   BP Temp Temp src Pulse Resp   01/14/22 1800 97/61 97.4 °F (36.3 °C) Oral 60 17   01/14/22 1743 128/68 97.5 °F (36.4 °C) -- 61 18   01/14/22 1700 122/66 -- -- 60 --   01/14/22 1630 120/74 -- -- 62 --   01/14/22 1600 128/71 -- -- 61 --   01/14/22 1535 (!) 144/66 97.4 °F (36.3 °C) -- 62 20   01/14/22 0816 118/75 97.5 °F (36.4 °C) Oral 60 18          Intake/Output Summary (Last 24 hours) at 1/14/2022 2040  Last data filed at 1/14/2022 1743  Gross per 24 hour   Intake 300 ml   Output 1800 ml   Net -1500 ml       Wt Readings from Last 3 Encounters:   01/13/22 241 lb 10 oz (109.6 kg)   01/07/22 256 lb (116.1 kg)   11/30/21 252 lb 8 oz (114.5 kg)       General: Age-appropriate obese white woman in no acute distress  HEENT: NC/AT, EOMI, sclera and conjunctiva are clear and anicteric. Mucous membranes moist.  Cardiovascular: regular rate and rhythm, no murmurs, gallops, or rubs  Respiratory: Bilateral dry crackles throughout both lung fields, coarse upper airway noises, mildly prolonged expiratory phase though no wheeze  Gastrointestinal: soft, nontender, nondistended, NABS  Ext: Scant distal lower extremity edema   Neuro: awake, alert, oriented x3. Moves all 4 extremities.   Cranial nerves II through XII grossly intact. Skin: dry, no rash      DATA:      Recent Labs     01/12/22  0510 01/13/22  0438 01/14/22  0345   WBC 2.4* 6.0 2.8*   HGB 10.1* 10.1* 10.1*   HCT 33.0* 31.9* 32.0*   .6* 98.2 100.0*    212 177     Recent Labs     01/12/22  0510 01/12/22  0510 01/13/22  0438 01/14/22  0345   *  --  135 137   K 6.2*   < > 4.5  4.5 4.5  4.5   CL 91*  --  92* 92*   CO2 14*  --  22 24   BUN 81*  --  41* 36*   CREATININE 9.4*  --  5.7* 4.8*    < > = values in this interval not displayed. Iron studies:  Lab Results   Component Value Date    FERRITIN 1,939 01/12/2022     Bone disease:  Lab Results   Component Value Date    MG 2.2 01/14/2022    PHOS 6.8 (H) 01/14/2022     Nutrition:No results found for: ALB    Microbiology:  Reviewed    DIALYSIS ORDERS:    Reviewed    ASSESSMENT / RECOMMENDATIONS:     1. ESRD  2. Anemia due to ESRD  3. 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 Hypertension  5.   [id number] pneumonia, complicated by diarrhea        HD today to bridge her to MWF schedule  JHONATAN --Retacrit while here  Continue other aspects of renal management  Continue supportive care    Shane Garcia MD, MD  1/14/2022

## 2022-01-16 LAB
CULTURE, RESPIRATORY: NORMAL
SMEAR, RESPIRATORY: NORMAL

## 2022-01-17 ENCOUNTER — CARE COORDINATION (OUTPATIENT)
Dept: CASE MANAGEMENT | Age: 67
End: 2022-01-17

## 2022-01-17 LAB
BLOOD CULTURE, ROUTINE: NORMAL
CULTURE, BLOOD 2: NORMAL
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
## 2022-01-19 ENCOUNTER — OFFICE VISIT (OUTPATIENT)
Dept: PRIMARY CARE CLINIC | Age: 67
End: 2022-01-19
Payer: MEDICARE

## 2022-01-19 VITALS
SYSTOLIC BLOOD PRESSURE: 102 MMHG | RESPIRATION RATE: 20 BRPM | TEMPERATURE: 97.5 F | WEIGHT: 240 LBS | HEART RATE: 74 BPM | HEIGHT: 64 IN | DIASTOLIC BLOOD PRESSURE: 62 MMHG | OXYGEN SATURATION: 94 % | BODY MASS INDEX: 40.97 KG/M2

## 2022-01-19 DIAGNOSIS — I27.20 PULMONARY HYPERTENSION, UNSPECIFIED (HCC): ICD-10-CM

## 2022-01-19 DIAGNOSIS — Z91.81 AT HIGH RISK FOR FALLS: Primary | ICD-10-CM

## 2022-01-19 DIAGNOSIS — R42 DIZZINESS ON STANDING: ICD-10-CM

## 2022-01-19 DIAGNOSIS — E66.01 CLASS 3 SEVERE OBESITY DUE TO EXCESS CALORIES WITH SERIOUS COMORBIDITY AND BODY MASS INDEX (BMI) OF 40.0 TO 44.9 IN ADULT (HCC): ICD-10-CM

## 2022-01-19 DIAGNOSIS — N18.6 ESRD ON HEMODIALYSIS (HCC): ICD-10-CM

## 2022-01-19 DIAGNOSIS — N18.6 ESRD (END STAGE RENAL DISEASE) (HCC): ICD-10-CM

## 2022-01-19 DIAGNOSIS — U07.1 PNEUMONIA DUE TO COVID-19 VIRUS: ICD-10-CM

## 2022-01-19 DIAGNOSIS — Z86.19 HISTORY OF CLOSTRIDIOIDES DIFFICILE COLITIS: ICD-10-CM

## 2022-01-19 DIAGNOSIS — J12.82 PNEUMONIA DUE TO COVID-19 VIRUS: ICD-10-CM

## 2022-01-19 DIAGNOSIS — E66.01 OBESITY, CLASS III, BMI 40-49.9 (MORBID OBESITY) (HCC): ICD-10-CM

## 2022-01-19 DIAGNOSIS — Z99.2 ESRD ON HEMODIALYSIS (HCC): ICD-10-CM

## 2022-01-19 DIAGNOSIS — R05.9 COUGH: ICD-10-CM

## 2022-01-19 DIAGNOSIS — R29.6 MULTIPLE FALLS: ICD-10-CM

## 2022-01-19 DIAGNOSIS — I95.1 ORTHOSTATIC HYPOTENSION: ICD-10-CM

## 2022-01-19 DIAGNOSIS — F41.9 ANXIETY AND DEPRESSION: ICD-10-CM

## 2022-01-19 DIAGNOSIS — Z95.5 HISTORY OF CORONARY ANGIOPLASTY WITH INSERTION OF STENT: ICD-10-CM

## 2022-01-19 DIAGNOSIS — I25.10 CORONARY ARTERY DISEASE INVOLVING NATIVE CORONARY ARTERY OF NATIVE HEART WITHOUT ANGINA PECTORIS: ICD-10-CM

## 2022-01-19 DIAGNOSIS — E11.8 DIABETES MELLITUS TYPE 2 WITH COMPLICATIONS (HCC): ICD-10-CM

## 2022-01-19 DIAGNOSIS — F32.A ANXIETY AND DEPRESSION: ICD-10-CM

## 2022-01-19 PROBLEM — E66.813 CLASS 3 SEVERE OBESITY DUE TO EXCESS CALORIES WITH SERIOUS COMORBIDITY AND BODY MASS INDEX (BMI) OF 40.0 TO 44.9 IN ADULT: Status: ACTIVE | Noted: 2022-01-19

## 2022-01-19 PROCEDURE — 4040F PNEUMOC VAC/ADMIN/RCVD: CPT | Performed by: INTERNAL MEDICINE

## 2022-01-19 PROCEDURE — 1090F PRES/ABSN URINE INCON ASSESS: CPT | Performed by: INTERNAL MEDICINE

## 2022-01-19 PROCEDURE — 1036F TOBACCO NON-USER: CPT | Performed by: INTERNAL MEDICINE

## 2022-01-19 PROCEDURE — 2022F DILAT RTA XM EVC RTNOPTHY: CPT | Performed by: INTERNAL MEDICINE

## 2022-01-19 PROCEDURE — G8484 FLU IMMUNIZE NO ADMIN: HCPCS | Performed by: INTERNAL MEDICINE

## 2022-01-19 PROCEDURE — G8427 DOCREV CUR MEDS BY ELIG CLIN: HCPCS | Performed by: INTERNAL MEDICINE

## 2022-01-19 PROCEDURE — 1111F DSCHRG MED/CURRENT MED MERGE: CPT | Performed by: INTERNAL MEDICINE

## 2022-01-19 PROCEDURE — G8417 CALC BMI ABV UP PARAM F/U: HCPCS | Performed by: INTERNAL MEDICINE

## 2022-01-19 PROCEDURE — 3046F HEMOGLOBIN A1C LEVEL >9.0%: CPT | Performed by: INTERNAL MEDICINE

## 2022-01-19 PROCEDURE — G8400 PT W/DXA NO RESULTS DOC: HCPCS | Performed by: INTERNAL MEDICINE

## 2022-01-19 PROCEDURE — 3017F COLORECTAL CA SCREEN DOC REV: CPT | Performed by: INTERNAL MEDICINE

## 2022-01-19 PROCEDURE — 1123F ACP DISCUSS/DSCN MKR DOCD: CPT | Performed by: INTERNAL MEDICINE

## 2022-01-19 PROCEDURE — 99205 OFFICE O/P NEW HI 60 MIN: CPT | Performed by: INTERNAL MEDICINE

## 2022-01-19 RX ORDER — FERROUS SULFATE 325(65) MG
325 TABLET ORAL
COMMUNITY
End: 2022-01-26

## 2022-01-19 RX ORDER — MIDODRINE HYDROCHLORIDE 10 MG/1
10 TABLET ORAL DAILY PRN
COMMUNITY
Start: 2021-12-14

## 2022-01-19 RX ORDER — CALCIUM ACETATE 667 MG/1
667-2001 CAPSULE ORAL
COMMUNITY
Start: 2021-04-05

## 2022-01-19 RX ORDER — ACETAMINOPHEN 325 MG/1
650 TABLET ORAL EVERY 6 HOURS PRN
COMMUNITY
Start: 2021-04-05 | End: 2022-09-22 | Stop reason: ALTCHOICE

## 2022-01-19 SDOH — ECONOMIC STABILITY: FOOD INSECURITY: WITHIN THE PAST 12 MONTHS, YOU WORRIED THAT YOUR FOOD WOULD RUN OUT BEFORE YOU GOT MONEY TO BUY MORE.: NEVER TRUE

## 2022-01-19 SDOH — ECONOMIC STABILITY: FOOD INSECURITY: WITHIN THE PAST 12 MONTHS, THE FOOD YOU BOUGHT JUST DIDN'T LAST AND YOU DIDN'T HAVE MONEY TO GET MORE.: NEVER TRUE

## 2022-01-19 ASSESSMENT — PATIENT HEALTH QUESTIONNAIRE - PHQ9
SUM OF ALL RESPONSES TO PHQ QUESTIONS 1-9: 12
6. FEELING BAD ABOUT YOURSELF - OR THAT YOU ARE A FAILURE OR HAVE LET YOURSELF OR YOUR FAMILY DOWN: 0
5. POOR APPETITE OR OVEREATING: 0
4. FEELING TIRED OR HAVING LITTLE ENERGY: 3
SUM OF ALL RESPONSES TO PHQ QUESTIONS 1-9: 12
SUM OF ALL RESPONSES TO PHQ QUESTIONS 1-9: 12
1. LITTLE INTEREST OR PLEASURE IN DOING THINGS: 2
SUM OF ALL RESPONSES TO PHQ9 QUESTIONS 1 & 2: 4
10. IF YOU CHECKED OFF ANY PROBLEMS, HOW DIFFICULT HAVE THESE PROBLEMS MADE IT FOR YOU TO DO YOUR WORK, TAKE CARE OF THINGS AT HOME, OR GET ALONG WITH OTHER PEOPLE: 0
7. TROUBLE CONCENTRATING ON THINGS, SUCH AS READING THE NEWSPAPER OR WATCHING TELEVISION: 1
9. THOUGHTS THAT YOU WOULD BE BETTER OFF DEAD, OR OF HURTING YOURSELF: 0
2. FEELING DOWN, DEPRESSED OR HOPELESS: 2
3. TROUBLE FALLING OR STAYING ASLEEP: 3
8. MOVING OR SPEAKING SO SLOWLY THAT OTHER PEOPLE COULD HAVE NOTICED. OR THE OPPOSITE, BEING SO FIGETY OR RESTLESS THAT YOU HAVE BEEN MOVING AROUND A LOT MORE THAN USUAL: 1
SUM OF ALL RESPONSES TO PHQ QUESTIONS 1-9: 12

## 2022-01-19 ASSESSMENT — SOCIAL DETERMINANTS OF HEALTH (SDOH): HOW HARD IS IT FOR YOU TO PAY FOR THE VERY BASICS LIKE FOOD, HOUSING, MEDICAL CARE, AND HEATING?: NOT HARD AT ALL

## 2022-01-19 NOTE — PROGRESS NOTES
2022    Lucille Garduno (:  1955) is a 73 y.o. female, here for a preventive medicine evaluation. Patient is here to establish with a new provider. Patient states she moved recently from [place]. She also was discharged from [facility] few days ago when she was hospitalized with COVID-pneumonia and also had C. difficile colitis. Patient states her diarrhea has resolved. She has a follow-up appointment with infectious disease. Patient also was septic due to infection from her dialysis access. Patient had an echo done and showed no vegetations. Patient is complaining of feeling very dizzy especially when she stands. Patient Active Problem List   Diagnosis    Nausea & vomiting    MSSA bacteremia    CLABSI (central line-associated bloodstream infection)    [facility] ([facility] stage renal disease) [facility]    Fever, unspecified    Essential hypertension    Hyperlipidemia    Diabetes mellitus type 2 with complications ([facility])    Neck pain    Anemia in CKD (chronic kidney disease)    Pneumonia due to COVID-19 virus    Pulmonary hypertension, unspecified ([facility])    Obesity, Class III, BMI 40-49.9 (morbid obesity) ([facility]    History of Clostridioides difficile colitis    Anxiety and depression    At high risk for falls    Dizziness on standing    Cough       Review of Systems    Prior to Visit Medications    Medication Sig Taking?  Authorizing Provider   ferrous sulfate (IRON 325) 325 (65 Fe) MG tablet Take 325 mg by mouth daily (with breakfast) Yes Historical Provider, MD   midodrine (PROAMATINE) 10 MG tablet TAKE 1 TABLET BY MOUTH ON TUESDAY, THURSDAY, AND SATURDAY BEFORE DIALYSIS TREATMENT Yes Historical Provider, MD   acetaminophen (TYLENOL) 325 MG tablet Take 650 mg by mouth every 6 hours as needed Yes Historical Provider, MD   calcium acetate (PHOSLO) 667 MG CAPS capsule Take 667 mg by mouth 3 times daily (with meals) Yes Historical Provider, MD   Compression Stockings [address] [facility] 2 each by Does not apply route Daily 20-30 mmhg Yes Lourdes Murrell MD   pantoprazole (PROTONIX) 40 MG tablet Take 1 tablet by mouth every morning (before breakfast) Yes TAWNYA Galvan CNP   atorvastatin (LIPITOR) 40 MG tablet Take 40 mg by mouth daily Yes Historical Provider, MD   carvedilol (COREG) 12.5 MG tablet Take 12.5 mg by mouth 2 times daily (with meals) Yes Historical Provider, MD   clopidogrel (PLAVIX) 75 MG tablet Take 75 mg by mouth daily Yes Historical Provider, MD   aspirin 81 MG EC tablet Take 81 mg by mouth daily Yes Historical Provider, MD   Insulin Glargine, 2 Unit Dial, (TOUJEO MAX SOLOSTAR) 300 UNIT/ML SOPN Inject 25 Units into the skin daily Yes Historical Provider, MD   insulin aspart (NOVOLOG) 100 UNIT/ML injection vial Inject 3 Units into the skin 3 times daily (before meals) Yes Historical Provider, MD        Allergies   Allergen Reactions    Sodium Hypochlorite Nausea And Vomiting and Other (See Comments)     Nausea and vomiting      Benzonatate Other (See Comments)     felt drunk    Morphine Itching    Other      bleach    Pcn [Penicillins] Rash       Past Medical History:   Diagnosis Date    Brain aneurysm     CLABSI (central line-associated bloodstream infection) 2021    Diabetes mellitus [facility]     ESRD on hemodialysis ([facility] 2021    H/O heart artery stent     Hyperlipidemia     Hypertension        Past Surgical History:   Procedure Laterality Date    CARDIAC SURGERY       SECTION      UPPER GASTROINTESTINAL ENDOSCOPY N/A 2021    EGD BIOPSY performed by Gilles Castaneda MD at [address] [facility] N/A 2021    CATHETER INSERTION  TUNNELED HEMODIALYSIS, WITH REMOVAL OF TEMPORARY CATHETER performed by Joslyn Erickson MD at [address] [facility] History     Socioeconomic History    Marital status:       Spouse name: Not on file    Number of children: Not on file    Years of education: Not on file    Highest education level: Not on file   Occupational History    Not on file   Tobacco Use    Smoking status: Former Smoker     Packs/day: 1.00     Years: 10.00     Pack years: 10.00     Quit date: 2017     Years since quittin.1    Smokeless tobacco: Never Used   Substance and Sexual Activity    Alcohol use: Never    Drug use: Never    Sexual activity: Not on file   Other Topics Concern    Not on file   Social History Narrative    Not on file     Social Determinants of Health     Financial Resource Strain: Low Risk     Difficulty of Paying Living Expenses: Not hard at all   Food Insecurity: No Food Insecurity    Worried About [facility] in the Last Year: Never true    [facility] in the Last Year: Never true   Transportation Needs:     Lack of Transportation (Medical): Not on file    Lack of Transportation (Non-Medical):  Not on file   Physical Activity:     Days of Exercise per Week: Not on file    Minutes of Exercise per Session: Not on file   Stress:     Feeling of Stress : Not on file   Social Connections:     Frequency of Communication with Friends and Family: Not on file    Frequency of Social Gatherings with Friends and Family: Not on file    Attends Synagogue Services: Not on file    Active Member of [address] [place] [address] [employer] or Organizations: Not on file    Attends Club or Organization Meetings: Not on file    Marital Status: Not on file   Intimate Partner Violence:     Fear of Current or Ex-Partner: Not on file    Emotionally Abused: Not on file    Physically Abused: Not on file    Sexually Abused: Not on file   Housing Stability:     Unable to Pay for Housing in the Last Year: Not on file    Number of [place] in the Last Year: Not on file    Unstable Housing in the Last Year: Not on file        Family History   Problem Relation Age of Onset    Coronary Art Dis Mother     Coronary Art Dis Father     Coronary Art Dis Brother        ADVANCE DIRECTIVE: N, <no information>    Vitals:    01/19/22 1217 01/19/22 1321 01/19/22 1322   BP: 110/60 (!) 122/50 102/62   Site: Right Upper Arm Right Upper Arm Right Upper Arm   Position: Sitting Sitting Standing   Cuff Size: Large Adult Large Adult Large Adult   Pulse: 74     Resp: 20     Temp: 97.5 °F (36.4 °C)     SpO2: 94%     Weight: 240 lb (108.9 kg)     Height: 5' 4\" (1.626 m)       Estimated body mass index is 41.2 kg/m² as calculated from the following:    Height as of this encounter: 5' 4\" (1.626 m). Weight as of this encounter: 240 lb (108.9 kg). Physical Exam  Constitutional:       Appearance: Normal appearance. She is obese. HENT:      Head: Normocephalic and atraumatic. Right Ear: External ear normal.      Left Ear: External ear normal.      Nose: Nose normal.      Mouth/Throat:      Pharynx: Oropharynx is clear. Eyes:      General:         Right eye: No discharge. Left eye: No discharge. Extraocular Movements: Extraocular movements intact. Conjunctiva/sclera: Conjunctivae normal.   Cardiovascular:      Rate and Rhythm: Normal rate and regular rhythm. Pulses: Normal pulses. Heart sounds: Normal heart sounds. No murmur heard. No gallop. Pulmonary:      Effort: Pulmonary effort is normal.      Breath sounds: Normal breath sounds. No wheezing or rales. Chest:      Chest wall: No tenderness. Abdominal:      General: Bowel sounds are normal.      Palpations: Abdomen is soft. There is no mass. Tenderness: There is no abdominal tenderness. There is no guarding. Musculoskeletal:      Cervical back: Normal range of motion and neck supple. Right lower leg: No edema. Left lower leg: No edema. Skin:     General: Skin is warm. Coloration: Skin is not jaundiced. Findings: No lesion or rash. Neurological:      Mental Status: She is alert and oriented to person, place, and time.       Gait: Gait abnormal.   Psychiatric:         Mood and Affect: Mood normal. Behavior: Behavior normal.         Thought Content: Thought content normal.         No flowsheet data found. Lab Results   Component Value Date    GLUCOSE 273 01/14/2022       The ASCVD Risk score (Ariadne Fish., et al., 2013) failed to calculate for the following reasons:    Cannot find a previous HDL lab    Cannot find a previous total cholesterol lab    Immunization History   Administered Date(s) Administered    COVID-19, Pfizer Purple top, DILUTE for use, 12+ yrs, 30mcg/0.3mL dose 05/12/2021, 06/06/2021    Influenza A ([id number]),all Formulations 01/25/2010    Influenza Virus Vaccine 11/03/2016    Influenza, Naldo Lard, Recombinant, IM PF (Flublok 18 yrs and older) 03/15/2021       Health Maintenance   Topic Date Due    Diabetic foot exam  Never done    Lipid screen  Never done    HIV screen  Never done    Diabetic retinal exam  Never done    DTaP/Tdap/Td vaccine (1 - Tdap) Never done    Cervical cancer screen  Never done    Colon cancer screen colonoscopy  Never done    Breast cancer screen  Never done    Shingles Vaccine (1 of 2) Never done    DEXA (modify frequency per FRAX score)  Never done    A1C test (Diabetic or Prediabetic)  02/18/2020    Pneumococcal 65+ yrs at Risk Vaccine (1 of 2 - PCV13) Never done    Flu vaccine (1) 09/01/2021    COVID-19 Vaccine (3 - Booster for Allen Peter series) 11/06/2021    Annual Wellness Visit (AWV)  Never done    Potassium monitoring  01/14/2023    Creatinine monitoring  01/14/2023    Depression Monitoring  01/19/2023    Hepatitis C screen  Completed    Hepatitis A vaccine  Aged Out    Hib vaccine  Aged Out    Meningococcal (ACWY) vaccine  Aged Out          ASSESSMENT/PLAN:  1. At high risk for falls  2. Obesity, Class III, BMI 40-49.9 (morbid obesity) (HCC)  -     TSH; Future  -     T4, FREE; Future  3. ESRD (end stage renal disease) ([facility]  -     COMPREHENSIVE METABOLIC PANEL; Future  -     CBC WITH AUTO DIFFERENTIAL; Future  4.  Pulmonary hypertension, unspecified ([facility]  5. Diabetes mellitus type 2 with complications (HCC)  -     TSH; Future  -     T4, FREE; Future  -     HEMOGLOBIN A1C; Future  6. Anxiety and depression  7. ESRD on hemodialysis ([facility]  8. History of Clostridioides difficile colitis  9. Class 3 severe obesity due to excess calories with serious comorbidity and body mass index (BMI) of 40.0 to 44.9 in adult ([facility]  10. Dizziness on standing  11. Pneumonia due to COVID-19 virus  -     XR CHEST STANDARD (2 VW); Future  -     MISCELLANEOUS SENDOUT 1; Future  12. Cough  -     XR CHEST STANDARD (2 VW); Future  13. Orthostatic hypotension  14. Coronary artery disease involving native coronary artery of native heart without angina pectoris  -     Guerita Guillen MD, Cardiology, [facility]  15. History of coronary angioplasty with insertion of stent  -     Guerita Guillen MD, Cardiology, [facility]  16. Multiple falls  -     [facility] - Physical Therapy, [address]      Return in about 3 weeks (around 2/9/2022). An electronic signature was used to authenticate this note. --Abbie Crow MD on 1/25/2022 at 12:58 AM    On the basis of positive falls risk screening, assessment and plan is as follows: home safety tips provided.

## 2022-01-21 ENCOUNTER — CARE COORDINATION (OUTPATIENT)
Dept: CASE MANAGEMENT | Age: 67
End: 2022-01-21

## 2022-01-21 NOTE — CARE COORDINATION
Follow Up Call    Challenges to be reviewed by the provider   Additional needs identified to be addressed with provider: No  none                 Encounter was not routed to provider for escalation. Method of communication with provider:  none. Contacted the patient by telephone to follow up after hospital visit. Status: significantly improved. Patient states she is \"feeling better. \"  Patient reports symptoms that remain are productive cough of dark green, funny taste in mouth, and poor sleep. Patient also reports a \"runny nose. \"  Patient states dizziness with standing reported to PCP at visit is improved. Patient states she started her oral steroid therapy today, was unable to obtain sooner d/t bad weather. Patient has resumed her dialysis routine(T-TH-SAT)and states she is back to work(works from home.)  Interventions to address identified needs: Scheduled appointment with PCP-established on 1/19/22 with follow up on 2/10/22    [facility] follow up appointment(s):   Future Appointments   Date Time Provider Surinder Pastrana   2/10/2022  2:30 PM Amira Christine MD [facility]     Non-[facility] follow up appointment(s): none   Follow up appointment completed? Yes. Patient to obtain compression stockings as ordered at visit today. Patient received call from cardiology regarding referral and will call back. Patient has not heard yet from outpatient PT. Patient aware to obtain labs/CXR as ordered by PCP. Provided contact information for future needs. No further follow-up call indicated based on severity of symptoms and risk factors.     Francisco Marinelli RN

## 2022-01-26 NOTE — PROGRESS NOTES
[address]   PRE-ADMISSION TESTING GENERAL INSTRUCTIONS  [facility] Phone Number: [phone]      GENERAL INSTRUCTIONS:    [x] Antibacterial Soap Shower AM of Surgery. [x] Do not wear makeup, lotions, powders, deodorant. [x] Nothing by mouth after midnight, including gum, candy, mints, or water. [x] You may brush your teeth, gargle, but do NOT swallow water. [x] No tobacco products, illegal drugs, or alcohol within 24 hours of your surgery. [x] Jewelry, valuables or body piercing's should not be brought to the hospital. All body and/or tongue piercing's must be removed prior to arriving to hospital. ALL hair pins must be removed. [x] Arrange transportation with a responsible adult  to and from the hospital. Arrange for someone to be with you for the remainder of the day and for 24 hours after your procedure due to having had anesthesia. Who will be your  for transportation? Erla Sensing- Daughter        Who will be staying with you for 24 hrs after your procedure? Erla Sensing- Daughter  [x] Bring insurance card and photo ID. [x] Bring copy of living will or healthcare power of  papers to be placed in your electronic record. PARKING INSTRUCTIONS:     [x] Arrival Time: 1/28/22   · [x] Parking lot '\"I\"  is located on [facility] (the corner of [facility]). To enter, press the button and the gate will lift. A free token will be provided to exit the lot. EDUCATION INSTRUCTIONS:        [x] Incentive Spirometry,coughing & deep breathing exercises reviewed. [x] Medication information sheet(s)   [x] Pain: Post-op pain is normal and to be expected. You will be asked to rate your pain from 0-10. [x] Ask your nurse for your pain medication. MEDICATION INSTRUCTIONS:    [x] Bring a complete list of your medications, please write the last time you took the medicine, give this list to the nurse.   [x] Take the following medications the morning of surgery with 1-2 ounces of water:   [x] Stop herbal supplements and vitamins 5 days before your surgery. [x] DO NOT take any diabetic medicine the morning of surgery. Follow instructions for insulin the day before surgery. [x] If you are diabetic and your blood sugar is low or you feel symptomatic, you may drink 1-2 ounces of apple juice or take a glucose tablet. The morning of your procedure, you may call the pre-op area if you have concerns about your blood sugar [phone]. [x] Follow physician instructions regarding any blood thinners you may be taking. WHAT TO EXPECT:    [x] The day of surgery you will be greeted and checked in by the [employer].  In addition, you will be registered in the [place] by a Patient Access Representative. Please bring your photo ID and insurance card. A nurse will greet you in accordance to the time you are needed in the pre-op area to prepare you for surgery. Please do not be discouraged if you are not greeted in the order you arrive as there are many variables that are involved in patient preparation. Your patience is greatly appreciated as you wait for your nurse. Please bring in items such as: books, magazines, newspapers, electronics, or any other items  to occupy your time in the waiting area. [x]  Delays may occur with surgery and staff will make a sincere effort to keep you informed of delays. If any delays occur with your procedure, we apologize ahead of time for your inconvenience as we recognize the value of your time.

## 2022-01-27 ENCOUNTER — ANESTHESIA EVENT (OUTPATIENT)
Dept: OPERATING ROOM | Age: 67
End: 2022-01-27
Payer: MEDICARE

## 2022-01-28 ENCOUNTER — HOSPITAL ENCOUNTER (OUTPATIENT)
Age: 67
Setting detail: OUTPATIENT SURGERY
Discharge: HOME | End: 2022-01-28
Attending: SURGERY | Admitting: SURGERY
Payer: MEDICARE

## 2022-01-28 ENCOUNTER — ANESTHESIA (OUTPATIENT)
Dept: OPERATING ROOM | Age: 67
End: 2022-01-28
Payer: MEDICARE

## 2022-01-28 VITALS — SYSTOLIC BLOOD PRESSURE: 85 MMHG | OXYGEN SATURATION: 95 % | DIASTOLIC BLOOD PRESSURE: 65 MMHG

## 2022-01-28 VITALS
TEMPERATURE: 97 F | RESPIRATION RATE: 16 BRPM | HEART RATE: 72 BPM | WEIGHT: 245 LBS | BODY MASS INDEX: 41.83 KG/M2 | DIASTOLIC BLOOD PRESSURE: 78 MMHG | SYSTOLIC BLOOD PRESSURE: 132 MMHG | OXYGEN SATURATION: 92 % | HEIGHT: 64 IN

## 2022-01-28 DIAGNOSIS — Z01.818 PRE-OP TESTING: Primary | ICD-10-CM

## 2022-01-28 DIAGNOSIS — N18.6 ESRD (END STAGE RENAL DISEASE) (HCC): ICD-10-CM

## 2022-01-28 LAB
ABO/RH: NORMAL
ANION GAP SERPL CALCULATED.3IONS-SCNC: 17 MMOL/L (ref 7–16)
ANTIBODY SCREEN: NORMAL
BUN BLDV-MCNC: 35 MG/DL (ref 6–23)
CALCIUM SERPL-MCNC: 8.6 MG/DL (ref 8.6–10.2)
CHLORIDE BLD-SCNC: 101 MMOL/L (ref 98–107)
CO2: 23 MMOL/L (ref 22–29)
CREAT SERPL-MCNC: 4.3 MG/DL (ref 0.5–1)
GFR AFRICAN AMERICAN: 12
GFR NON-AFRICAN AMERICAN: 10 ML/MIN/1.73
GLUCOSE BLD-MCNC: 204 MG/DL (ref 74–99)
HCT VFR BLD CALC: 35.8 % (ref 34–48)
HEMOGLOBIN: 10.9 G/DL (ref 11.5–15.5)
INR BLD: 1.1
MCH RBC QN AUTO: 30.6 PG (ref 26–35)
MCHC RBC AUTO-ENTMCNC: 30.4 % (ref 32–34.5)
MCV RBC AUTO: 100.6 FL (ref 80–99.9)
PDW BLD-RTO: 15.4 FL (ref 11.5–15)
PLATELET # BLD: 104 E9/L (ref 130–450)
PMV BLD AUTO: 10.3 FL (ref 7–12)
POTASSIUM REFLEX MAGNESIUM: 5.1 MMOL/L (ref 3.5–5)
PROTHROMBIN TIME: 11.4 SEC (ref 9.3–12.4)
RBC # BLD: 3.56 E12/L (ref 3.5–5.5)
SODIUM BLD-SCNC: 141 MMOL/L (ref 132–146)
WBC # BLD: 7 E9/L (ref 4.5–11.5)

## 2022-01-28 PROCEDURE — 6360000002 HC RX W HCPCS: Performed by: SURGERY

## 2022-01-28 PROCEDURE — A4217 STERILE WATER/SALINE, 500 ML: HCPCS | Performed by: SURGERY

## 2022-01-28 PROCEDURE — 86900 BLOOD TYPING SEROLOGIC ABO: CPT

## 2022-01-28 PROCEDURE — 6360000002 HC RX W HCPCS: Performed by: ANESTHESIOLOGY

## 2022-01-28 PROCEDURE — 6360000002 HC RX W HCPCS: Performed by: NURSE ANESTHETIST, CERTIFIED REGISTERED

## 2022-01-28 PROCEDURE — 85027 COMPLETE CBC AUTOMATED: CPT

## 2022-01-28 PROCEDURE — 80048 BASIC METABOLIC PNL TOTAL CA: CPT

## 2022-01-28 PROCEDURE — 86850 RBC ANTIBODY SCREEN: CPT

## 2022-01-28 PROCEDURE — 3700000001 HC ADD 15 MINUTES (ANESTHESIA): Performed by: SURGERY

## 2022-01-28 PROCEDURE — 2580000003 HC RX 258: Performed by: SURGERY

## 2022-01-28 PROCEDURE — 3600000002 HC SURGERY LEVEL 2 BASE: Performed by: SURGERY

## 2022-01-28 PROCEDURE — 3600000012 HC SURGERY LEVEL 2 ADDTL 15MIN: Performed by: SURGERY

## 2022-01-28 PROCEDURE — 7100000010 HC PHASE II RECOVERY - FIRST 15 MIN: Performed by: SURGERY

## 2022-01-28 PROCEDURE — 85610 PROTHROMBIN TIME: CPT

## 2022-01-28 PROCEDURE — 2709999900 HC NON-CHARGEABLE SUPPLY: Performed by: SURGERY

## 2022-01-28 PROCEDURE — 2580000003 HC RX 258: Performed by: NURSE PRACTITIONER

## 2022-01-28 PROCEDURE — 7100000011 HC PHASE II RECOVERY - ADDTL 15 MIN: Performed by: SURGERY

## 2022-01-28 PROCEDURE — 36832 AV FISTULA REVISION OPEN: CPT | Performed by: SURGERY

## 2022-01-28 PROCEDURE — 82962 GLUCOSE BLOOD TEST: CPT

## 2022-01-28 PROCEDURE — 6360000002 HC RX W HCPCS: Performed by: NURSE PRACTITIONER

## 2022-01-28 PROCEDURE — 86901 BLOOD TYPING SEROLOGIC RH(D): CPT

## 2022-01-28 PROCEDURE — 2500000003 HC RX 250 WO HCPCS

## 2022-01-28 PROCEDURE — 3700000000 HC ANESTHESIA ATTENDED CARE: Performed by: SURGERY

## 2022-01-28 RX ORDER — FENTANYL CITRATE 50 UG/ML
INJECTION, SOLUTION INTRAMUSCULAR; INTRAVENOUS PRN
Status: DISCONTINUED | OUTPATIENT
Start: 2022-01-28 | End: 2022-01-28 | Stop reason: SDUPTHER

## 2022-01-28 RX ORDER — SODIUM CHLORIDE 0.9 % (FLUSH) 0.9 %
5-40 SYRINGE (ML) INJECTION PRN
Status: DISCONTINUED | OUTPATIENT
Start: 2022-01-28 | End: 2022-01-28 | Stop reason: HOSPADM

## 2022-01-28 RX ORDER — FENTANYL CITRATE 50 UG/ML
25 INJECTION, SOLUTION INTRAMUSCULAR; INTRAVENOUS EVERY 5 MIN PRN
Status: DISCONTINUED | OUTPATIENT
Start: 2022-01-28 | End: 2022-01-28 | Stop reason: HOSPADM

## 2022-01-28 RX ORDER — KETAMINE HCL IN NACL, ISO-OSM 100MG/10ML
SYRINGE (ML) INJECTION PRN
Status: DISCONTINUED | OUTPATIENT
Start: 2022-01-28 | End: 2022-01-28 | Stop reason: SDUPTHER

## 2022-01-28 RX ORDER — SODIUM CHLORIDE 9 MG/ML
25 INJECTION, SOLUTION INTRAVENOUS PRN
Status: DISCONTINUED | OUTPATIENT
Start: 2022-01-28 | End: 2022-01-28 | Stop reason: HOSPADM

## 2022-01-28 RX ORDER — MIDAZOLAM HYDROCHLORIDE 2 MG/2ML
2 INJECTION, SOLUTION INTRAMUSCULAR; INTRAVENOUS ONCE
Status: COMPLETED | OUTPATIENT
Start: 2022-01-28 | End: 2022-01-28

## 2022-01-28 RX ORDER — ONDANSETRON 2 MG/ML
4 INJECTION INTRAMUSCULAR; INTRAVENOUS
Status: DISCONTINUED | OUTPATIENT
Start: 2022-01-28 | End: 2022-01-28 | Stop reason: HOSPADM

## 2022-01-28 RX ORDER — PROPOFOL 10 MG/ML
INJECTION, EMULSION INTRAVENOUS CONTINUOUS PRN
Status: DISCONTINUED | OUTPATIENT
Start: 2022-01-28 | End: 2022-01-28 | Stop reason: SDUPTHER

## 2022-01-28 RX ORDER — OXYCODONE HYDROCHLORIDE AND ACETAMINOPHEN 5; 325 MG/1; MG/1
1 TABLET ORAL EVERY 6 HOURS PRN
Qty: 25 TABLET | Refills: 0 | Status: SHIPPED | OUTPATIENT
Start: 2022-01-28 | End: 2022-02-11

## 2022-01-28 RX ORDER — SODIUM CHLORIDE 9 MG/ML
INJECTION, SOLUTION INTRAVENOUS CONTINUOUS
Status: DISCONTINUED | OUTPATIENT
Start: 2022-01-28 | End: 2022-01-28 | Stop reason: HOSPADM

## 2022-01-28 RX ORDER — ROPIVACAINE HYDROCHLORIDE 5 MG/ML
25 INJECTION, SOLUTION EPIDURAL; INFILTRATION; PERINEURAL ONCE
Status: COMPLETED | OUTPATIENT
Start: 2022-01-28 | End: 2022-01-28

## 2022-01-28 RX ORDER — ROPIVACAINE HYDROCHLORIDE 5 MG/ML
INJECTION, SOLUTION EPIDURAL; INFILTRATION; PERINEURAL
Status: COMPLETED | OUTPATIENT
Start: 2022-01-28 | End: 2022-01-28

## 2022-01-28 RX ORDER — SODIUM CHLORIDE 0.9 % (FLUSH) 0.9 %
5-40 SYRINGE (ML) INJECTION EVERY 12 HOURS SCHEDULED
Status: DISCONTINUED | OUTPATIENT
Start: 2022-01-28 | End: 2022-01-28 | Stop reason: HOSPADM

## 2022-01-28 RX ADMIN — PROPOFOL 25 MG: 10 INJECTION, EMULSION INTRAVENOUS at 12:40

## 2022-01-28 RX ADMIN — ROPIVACAINE HYDROCHLORIDE 25 ML: 5 INJECTION, SOLUTION EPIDURAL; INFILTRATION; PERINEURAL at 12:10

## 2022-01-28 RX ADMIN — SODIUM CHLORIDE: 9 INJECTION, SOLUTION INTRAVENOUS at 12:29

## 2022-01-28 RX ADMIN — Medication 40 MG: at 12:37

## 2022-01-28 RX ADMIN — Medication 3 G: at 12:37

## 2022-01-28 RX ADMIN — ROPIVACAINE HYDROCHLORIDE 25 ML: 5 INJECTION EPIDURAL; INFILTRATION; PERINEURAL at 12:48

## 2022-01-28 RX ADMIN — PROPOFOL 25 MG: 10 INJECTION, EMULSION INTRAVENOUS at 12:42

## 2022-01-28 RX ADMIN — FENTANYL CITRATE 50 MCG: 50 INJECTION, SOLUTION INTRAMUSCULAR; INTRAVENOUS at 12:38

## 2022-01-28 RX ADMIN — PROPOFOL 100 MCG/KG/MIN: 10 INJECTION, EMULSION INTRAVENOUS at 12:37

## 2022-01-28 RX ADMIN — MIDAZOLAM 2 MG: 1 INJECTION INTRAMUSCULAR; INTRAVENOUS at 12:09

## 2022-01-28 ASSESSMENT — PULMONARY FUNCTION TESTS
PIF_VALUE: 1
PIF_VALUE: 1
PIF_VALUE: 18
PIF_VALUE: 1
PIF_VALUE: 0
PIF_VALUE: 1
PIF_VALUE: 1
PIF_VALUE: 0
PIF_VALUE: 0
PIF_VALUE: 1
PIF_VALUE: 0
PIF_VALUE: 1
PIF_VALUE: 2
PIF_VALUE: 1
PIF_VALUE: 3
PIF_VALUE: 1
PIF_VALUE: 1
PIF_VALUE: 0
PIF_VALUE: 0
PIF_VALUE: 1
PIF_VALUE: 0
PIF_VALUE: 1
PIF_VALUE: 0
PIF_VALUE: 1
PIF_VALUE: 0
PIF_VALUE: 1
PIF_VALUE: 1
PIF_VALUE: 16
PIF_VALUE: 1
PIF_VALUE: 0
PIF_VALUE: 1

## 2022-01-28 ASSESSMENT — LIFESTYLE VARIABLES: SMOKING_STATUS: 0

## 2022-01-28 ASSESSMENT — PAIN SCALES - GENERAL
PAINLEVEL_OUTOF10: 0

## 2022-01-28 ASSESSMENT — PAIN - FUNCTIONAL ASSESSMENT: PAIN_FUNCTIONAL_ASSESSMENT: 0-10

## 2022-01-28 NOTE — ANESTHESIA PRE PROCEDURE
Department of Anesthesiology  Preprocedure Note       Name:  Tamie Romero   Age:  73 y.o.  :  1955                                          MRN:  [id number]         Date:  2022      Surgeon: Keyshawn Avila):  Marcial Perdomo MD    Procedure: Procedure(s):  REVISION AV FISTULA LEFT ARM    Medications prior to admission:   Prior to Admission medications    Medication Sig Start Date End Date Taking? Authorizing Provider   midodrine (PROAMATINE) 10 MG tablet TAKE 1 TABLET BY MOUTH ON TUESDAY, THURSDAY, AND SATURDAY BEFORE DIALYSIS TREATMENT 21   Historical Provider, MD   acetaminophen (TYLENOL) 325 MG tablet Take 650 mg by mouth every 6 hours as needed 21   Historical Provider, MD   calcium acetate (PHOSLO) 667 MG CAPS capsule Take 667 mg by mouth 3 times daily (with meals) 21   Historical Provider, MD   Compression Stockings [facility] 2 each by Does not apply route Daily 20-30 mmhg 22   Herbie Reyes MD   pantoprazole (PROTONIX) 40 MG tablet Take 1 tablet by mouth every morning (before breakfast) 12/3/21   Sol Santacruz, APRN - CNP   atorvastatin (LIPITOR) 40 MG tablet Take 40 mg by mouth daily    Historical Provider, MD   carvedilol (COREG) 12.5 MG tablet Take 6.25 mg by mouth 2 times daily (with meals)     Historical Provider, MD   clopidogrel (PLAVIX) 75 MG tablet Take 75 mg by mouth daily    Historical Provider, MD   aspirin 81 MG EC tablet Take 81 mg by mouth daily    Historical Provider, MD   Insulin Glargine, 2 Unit Dial, (TOUJEO MAX SOLOSTAR) 300 UNIT/ML SOPN Inject 25 Units into the skin daily    Historical Provider, MD   insulin aspart (NOVOLOG) 100 UNIT/ML injection vial Inject 3 Units into the skin 3 times daily (before meals)    Historical Provider, MD       Current medications:    No current facility-administered medications for this visit. No current outpatient medications on file.      Facility-Administered Medications Ordered in Other Visits   Medication Dose Route Frequency Provider Last Rate Last Admin    0.9 % sodium chloride infusion   IntraVENous Continuous TAWNYA Lin - CNP        sodium chloride flush 0.9 % injection 5-40 mL  5-40 mL IntraVENous 2 times per day Sukumar Saucedo APRN - CNP        sodium chloride flush 0.9 % injection 5-40 mL  5-40 mL IntraVENous PRN Sukumar Saucedo APRN - CNP        0.9 % sodium chloride infusion  25 mL IntraVENous PRN TAWNYA Lin - CNP        ceFAZolin (ANCEF) 2000 mg in sterile water 20 mL IV syringe  2,000 mg IntraVENous On Call to [address] [facility], APRN - CNP        fentaNYL (SUBLIMAZE) injection 25 mcg  25 mcg IntraVENous Q5 Min PRN Ju Dubose MD        ondansetron [facility] injection 4 mg  4 mg IntraVENous Once PRN Ju Dubose MD           Allergies: Allergies   Allergen Reactions    Sodium Hypochlorite Nausea And Vomiting and Other (See Comments)     Nausea and vomiting      Benzonatate Other (See Comments)     felt drunk    Morphine Itching    Other      bleach    Pcn [Penicillins] Rash       Problem List:    Patient Active Problem List   Diagnosis Code    Nausea & vomiting R11.2    MSSA bacteremia R78.81, B95.61    CLABSI (central line-associated bloodstream infection) T80.211A    ESRD (end stage renal disease) ([facility] N18.6    Fever, unspecified R50.9    Essential hypertension I10    Hyperlipidemia E78.5    Diabetes mellitus type 2 with complications ([facility] [address] N18.5    Neck pain M54.2    Anemia in CKD (chronic kidney disease) N18.9, D63.1    Pneumonia due to COVID-19 virus U07.1, J12.82    Pulmonary hypertension, unspecified ([facility]) I27.20    Obesity, Class III, BMI 40-49.9 (morbid obesity) ([facility]) E66.01    History of Clostridioides difficile colitis Z86.19    Anxiety and depression F41.9, F32. A    At high risk for falls Z91.81    Dizziness on standing R42    Cough R05.9       Past Medical History:        Diagnosis Date    Brain aneurysm     CLABSI (central line-associated bloodstream infection) 2021    Diabetes mellitus ([facility]     ESRD on hemodialysis ([facility] 2021    H/O heart artery stent     Hyperlipidemia     Hypertension        Past Surgical History:        Procedure Laterality Date    CARDIAC SURGERY       SECTION      UPPER GASTROINTESTINAL ENDOSCOPY N/A 2021    EGD BIOPSY performed by Brien Man MD at [address] [facility] N/A 2021    CATHETER INSERTION  TUNNELED HEMODIALYSIS, WITH REMOVAL OF TEMPORARY CATHETER performed by Derrick Crowell MD at [address] [facility] History:    Social History     Tobacco Use    Smoking status: Former Smoker     Packs/day: 1.00     Years: 10.00     Pack years: 10.00     Quit date: 2017     Years since quittin.1    Smokeless tobacco: Never Used   Substance Use Topics    Alcohol use: Never                                Counseling given: Not Answered      Vital Signs (Current): There were no vitals filed for this visit.                                            BP Readings from Last 3 Encounters:   22 (!) 121/58   22 102/62   22 97/61       NPO Status:                                                                                 BMI:   Wt Readings from Last 3 Encounters:   22 245 lb (111.1 kg)   22 240 lb (108.9 kg)   22 241 lb 10 oz (109.6 kg)     There is no height or weight on file to calculate BMI.    CBC:   Lab Results   Component Value Date    WBC 7.0 2022    RBC 3.56 2022    HGB 10.9 2022    HCT 35.8 2022    .6 2022    RDW 15.4 2022     2022       CMP:   Lab Results   Component Value Date     2022    K 5.1 2022     2022    CO2 23 2022    BUN 35 2022    CREATININE 4.3 2022    GFRAA 12 2022    LABGLOM 10 2022    GLUCOSE 204 2022    PROT 7.4 2022    CALCIUM 8.6 2022    BILITOT 0.2 2022 ALKPHOS 75 01/14/2022    AST 25 01/14/2022    ALT <5 01/14/2022       POC Tests: No results for input(s): POCGLU, POCNA, POCK, POCCL, POCBUN, POCHEMO, POCHCT in the last 72 hours. Coags:   Lab Results   Component Value Date    PROTIME 11.4 01/28/2022    INR 1.1 01/28/2022    APTT 45.1 11/30/2021       HCG (If Applicable): No results found for: PREGTESTUR, PREGSERUM, HCG, HCGQUANT     ABGs: No results found for: PHART, PO2ART, VBH3QMZ, PIH6EPO, BEART, A2FGIMKF     Type & Screen (If Applicable):  No results found for: LABABO, LABRH    Drug/Infectious Status (If Applicable):  No results found for: HIV, HEPCAB    COVID-19 Screening (If Applicable):   Lab Results   Component Value Date    COVID19 Positive 01/07/2022    COVID19 detected 01/07/2022       Narrative   EXAMINATION:   ONE XRAY VIEW OF THE CHEST PORTABLE SUPINE       11/30/2021 10:37 am       COMPARISON:   11/24/2021       HISTORY:   ORDERING SYSTEM PROVIDED HISTORY: epigastric pain   TECHNOLOGIST PROVIDED HISTORY:   Reason for exam:->epigastric pain       FINDINGS:   The right subclavian dialysis catheter appears unchanged in position.  Again   seen is short length mild kinking of the catheter at the level of the right   1st rib and the degree of catheter narrowing appears less conspicuous than   prior.  Otherwise, no significant change.  Cardiomegaly and mild pulmonary   venous congestion.  No acute pulmonary infiltrate or pleural effusion.  No   pneumothorax.          Anesthesia Evaluation  Patient summary reviewed and Nursing notes reviewed no history of anesthetic complications:   Airway: Mallampati: II  TM distance: >3 FB   Neck ROM: full  Mouth opening: > = 3 FB Dental: normal exam         Pulmonary:Negative Pulmonary ROS and normal exam  breath sounds clear to auscultation      (-) not a current smoker (Former)                           Cardiovascular:  Exercise tolerance: good (>4 METS),   (+) hypertension: moderate, CAD: obstructive, CABG/stent (PTCA with AKRMA):, pulmonary hypertension: mild, hyperlipidemia      ECG reviewed  Rhythm: regular  Rate: normal  Echocardiogram reviewed         Beta Blocker:  Dose within 24 Hrs      ROS comment: Sinus rhythm with marked sinus arrhythmia with occasional premature ventricular complexes  Moderate voltage criteria for LVH, may be normal variant  Nonspecific ST and T wave abnormality  Abnormal ECG  No previous ECGs available    Summary  No PENELOPE indication of Endocarditis. Normal left ventricular chamber size and systolic function. Left atrium is mildly enlarged. Interatrial septum intact, focal area of calcification near mid septum. No thrombus in left atrium or left atrial appendage. Normal right ventricle size and function. Moderately enlarged right atrium size. The aortic valve appears moderately sclerotic. No hemodynamically significant aortic stenosis, mean gradient 14mmHg, peak gradient 24mmHg, ERENDIRA 2.1cm2 by VTI, Vmax and  planimetry. There is mild tricuspid regurgitation. Mild Pulmonary hypertension, RVSP 40mmHg. Normal aortic root size. No evidence of pericardial effusion. No comparison study available. Neuro/Psych:                ROS comment: Cerebral aneurysm    Ambulatory dysfunction and fatigue GI/Hepatic/Renal:   (+) GERD: no interval change, renal disease (32/5.3 w/ GFR 8): ESRD and dialysis, morbid obesity (BMI 43.4 kg/m2)         ROS comment: Upper GI bleed. Endo/Other:    (+) DiabetesType II DM, using insulin, blood dyscrasia (7.6/24.0): anemia:., .          Pt had no PAT visit        ROS comment: MSSA bacteremia    CLABSI (central line-associated bloodstream infection)     Abdominal:             Vascular: negative vascular ROS. Other Findings:               Anesthesia Plan      MAC and regional     ASA 4       Induction: intravenous. MIPS: Postoperative opioids intended. Anesthetic plan and risks discussed with patient. Plan discussed with CRNA.           Negrita Zarate MD Donnell Dinh MD   1/28/2022        History, data, and pertinent studies from chart review. Above represents information available via the shared medical record including previous anesthetic, medication, and allergy history.   Confirmation of above and final disposition per DOS anesthesiologist.    Donnell Moser MD  Staff Anesthesiologist  January 28, 2022  12:01 PM

## 2022-01-28 NOTE — PROGRESS NOTES
CLINICAL PHARMACY NOTE: MEDS TO BEDS    Total # of Prescriptions Filled: 1   The following medications were delivered to the patient:  · Percocet 5-325 mg    Additional Documentation:  Daughter picked up meds in pharmacy

## 2022-01-28 NOTE — OP NOTE
Operative Note      Patient: Marta Laurent  YOB: 1955  MRN: [id number]    Date of Procedure: 1/28/2022    Pre-Op Diagnosis: CHRONIC RENAL FAILURE    Post-Op Diagnosis: Same       Procedure :  Revision of L UE BC AVF - ligation of branches, superficialization    Surgeon(s):  Council Cowden, MD    Assistant:   Surgical Assistant: Aleah Thayer  Resident: Carolina Williamson MD    Anesthesia: Regional    Estimated Blood Loss (mL): less than 50     Complications: None    Specimens:   * No specimens in log *    Implants:  * No implants in log *      Drains: * No LDAs found *    Findings: well dilated vein    DESCRIPTION OF PROCEDURE: The patient was identified and the procedure was confirmed. The left arm was prepped and draped in the usual sterile fashion. Lidocaine 1% mixed with 0.25% Marcaine was used for local anesthesia as needed. A skin incision was made in the distal upper arm and carried down through the subcutaneous tissue. The cephalic vein was identified and freed from the surrounding tissues. Branches were divided between silk ties. Through this incision and a longitudinal incision made in the upper arm freeing up the length of the cephalic vein. The underlying fat was closed with running vicryl suture and flap created to allow vein to be more superficial.      Good flow was appreciated through the fistula, and a 1+ radial pulse was appreciated at the wrist. Hemostasis was obtained, and the incisions were irrigated with saline solution. The incisions were approximated with multiple layers of Vicryl suture, and Dermabond was applied to the skin incisions in the operating room. Needle, sponge and instrument counts were reported as correct x2. The patient tolerated the procedure and was transferred to the recovery area in satisfactory condition.      Council Cowden, MD    CC : Nba Chairez MD  Nephrologist : Dr. Sarina Bullard, NP [phone] : [facility] AT [facility]    3/2021 R IJ tunn hd catheter [place]    7/2021 L BC AVF - [place]   11/24/21 R subclavian tunn hd cath (R IJ occlusion)   1/28/22 L BC AVF revision, superficialization         Electronically signed by Savannah Hicks MD on 1/28/2022 at 1:21 PM

## 2022-01-28 NOTE — H&P
TAWNYA - CNP    0.9 % sodium chloride infusion, 25 mL, IntraVENous, PRN, TAWNYA Strong CNP    ceFAZolin (ANCEF) 2000 mg in sterile water 20 mL IV syringe, 2,000 mg, IntraVENous, On Call to OR, TANWYA Strong CNP    ropivacaine (NAROPIN) 0.5% injection 25 mL, 25 mL, Other, Once, Doris Sorensen MD    midazolam PF (VERSED) injection 2 mg, 2 mg, IntraVENous, Once, Doris Sorensen MD    fentaNYL (SUBLIMAZE) injection 25 mcg, 25 mcg, IntraVENous, Q5 Min PRN, Doris Sorensen MD    ondansetron [facility] injection 4 mg, 4 mg, IntraVENous, Once PRN, Doris Sorensen MD  Allergies:  Sodium hypochlorite, Benzonatate, Morphine, Other, and Pcn [penicillins]  Social History     Socioeconomic History    Marital status:      Spouse name: Not on file    Number of children: Not on file    Years of education: Not on file    Highest education level: Not on file   Occupational History    Not on file   Tobacco Use    Smoking status: Former Smoker     Packs/day: 1.00     Years: 10.00     Pack years: 10.00     Quit date: 2017     Years since quittin.1    Smokeless tobacco: Never Used   Vaping Use    Vaping Use: Never used   Substance and Sexual Activity    Alcohol use: Never    Drug use: Never    Sexual activity: Not Currently   Other Topics Concern    Not on file   Social History Narrative    Not on file     Social Determinants of Health     Financial Resource Strain: Low Risk     Difficulty of Paying Living Expenses: Not hard at all   Food Insecurity: No Food Insecurity    Worried About [facility] in the Last Year: Never true    [facility] in the Last Year: Never true   Transportation Needs:     Lack of Transportation (Medical): Not on file    Lack of Transportation (Non-Medical):  Not on file   Physical Activity:     Days of Exercise per Week: Not on file    Minutes of Exercise per Session: Not on file   Stress:     Feeling of Stress : Not on file   Social Connections:     Frequency of Communication with Friends and Family: Not on file    Frequency of Social Gatherings with Friends and Family: Not on file    Attends [employer] Services: Not on file    Active Member of Clubs or Organizations: Not on file    Attends Club or Organization Meetings: Not on file    Marital Status: Not on file   Intimate Partner Violence:     Fear of Current or Ex-Partner: Not on file    Emotionally Abused: Not on file    Physically Abused: Not on file    Sexually Abused: Not on file   Housing Stability:     Unable to Pay for Housing in the Last Year: Not on file    Number of [place] in the Last Year: Not on file    Unstable Housing in the Last Year: Not on file     Family History   Problem Relation Age of Onset    Coronary Art Dis Mother     Coronary Art Dis Father     Coronary Art Dis Brother        REVIEW OF SYSTEMS:  The chart was reviewed.     PHYSICAL EXAM:    Vitals:    01/28/22 1003   BP: (!) 121/58   Pulse: 73   Resp: 18   Temp: 97.8 °F (36.6 °C)   SpO2: 93%     CONSTITUTIONAL:  awake, alert, cooperative, no apparent distress, and appears stated age  NECK:  supple, symmetrical, trachea midline  LUNGS:  no increased work of breathing, good air exchange  CARDIOVASCULAR:  regular rate and rhythm   ABDOMEN:  soft, non-distended and non-tender  left upper extremity   Thrill/bruit present over AVF, runs deep    LABS:    Lab Results   Component Value Date    WBC 7.0 01/28/2022    HGB 10.9 (L) 01/28/2022    HCT 35.8 01/28/2022     (L) 01/28/2022    PROTIME 11.4 01/28/2022    INR 1.1 01/28/2022    APTT 45.1 (H) 11/30/2021    K 5.1 (H) 01/28/2022    BUN 35 (H) 01/28/2022    CREATININE 4.3 (H) 01/28/2022       Charity Dear, TAWNYA Davidson MD

## 2022-01-28 NOTE — ANESTHESIA POSTPROCEDURE EVALUATION
Department of Anesthesiology  Postprocedure Note    Patient: [facility]  MRN: [id number]  YOB: 1955  Date of evaluation: 1/28/2022  Time:  2:02 PM     Procedure Summary     Date: 01/28/22 Room / Location: [place] [address]    Anesthesia Start: 1229 Anesthesia Stop: 1401    Procedure: REVISION AV FISTULA LEFT ARM (Left ) Diagnosis: (CHRONIC RENAL FAILURE)    Surgeons: Elena Blackwell MD Responsible Provider: Guillermo Maharaj MD    Anesthesia Type: MAC, regional ASA Status: 4          Anesthesia Type: MAC, regional    Gloria Phase I: Gloria Score: 10    Gloria Phase II:      Last vitals: Reviewed and per EMR flowsheets.        Anesthesia Post Evaluation    Patient location during evaluation: PACU  Patient participation: complete - patient participated  Level of consciousness: awake and alert  Pain score: 0  Airway patency: patent  Nausea & Vomiting: no nausea and no vomiting  Complications: no  Cardiovascular status: hemodynamically stable  Respiratory status: acceptable, room air and spontaneous ventilation  Hydration status: stable

## 2022-01-28 NOTE — PROGRESS NOTES
Discharge instructions provided to patient, with daughter at bedside, written and verbal, patient verbalized understanding. Iv site removed. Assisted patient in getting dressed. Transport requested.

## 2022-01-29 LAB — METER GLUCOSE: 188 MG/DL (ref 74–99)

## 2022-02-18 PROBLEM — R05.9 COUGH: Status: RESOLVED | Noted: 2022-01-19 | Resolved: 2022-02-18

## 2022-03-02 ENCOUNTER — OFFICE VISIT (OUTPATIENT)
Dept: PRIMARY CARE CLINIC | Age: 67
End: 2022-03-02

## 2022-03-02 VITALS
SYSTOLIC BLOOD PRESSURE: 144 MMHG | HEART RATE: 86 BPM | DIASTOLIC BLOOD PRESSURE: 62 MMHG | OXYGEN SATURATION: 96 % | TEMPERATURE: 97.1 F | HEIGHT: 64 IN | WEIGHT: 250 LBS | BODY MASS INDEX: 42.68 KG/M2 | RESPIRATION RATE: 18 BRPM

## 2022-03-02 DIAGNOSIS — H26.9 CATARACT OF BOTH EYES, UNSPECIFIED CATARACT TYPE: ICD-10-CM

## 2022-03-02 DIAGNOSIS — F32.A ANXIETY AND DEPRESSION: Primary | ICD-10-CM

## 2022-03-02 DIAGNOSIS — Z99.2 ESRD ON HEMODIALYSIS (HCC): ICD-10-CM

## 2022-03-02 DIAGNOSIS — N18.6 ESRD ON HEMODIALYSIS (HCC): ICD-10-CM

## 2022-03-02 DIAGNOSIS — E11.8 DIABETES MELLITUS TYPE 2 WITH COMPLICATIONS (HCC): ICD-10-CM

## 2022-03-02 DIAGNOSIS — Z91.81 AT HIGH RISK FOR FALLS: ICD-10-CM

## 2022-03-02 DIAGNOSIS — I27.20 PULMONARY HYPERTENSION, UNSPECIFIED (HCC): ICD-10-CM

## 2022-03-02 DIAGNOSIS — F41.9 ANXIETY AND DEPRESSION: Primary | ICD-10-CM

## 2022-03-02 DIAGNOSIS — E66.01 CLASS 3 SEVERE OBESITY DUE TO EXCESS CALORIES WITH SERIOUS COMORBIDITY AND BODY MASS INDEX (BMI) OF 40.0 TO 44.9 IN ADULT (HCC): ICD-10-CM

## 2022-03-02 DIAGNOSIS — E78.2 MIXED HYPERLIPIDEMIA: ICD-10-CM

## 2022-03-02 LAB — HBA1C MFR BLD: 8.9 %

## 2022-03-02 PROCEDURE — G8427 DOCREV CUR MEDS BY ELIG CLIN: HCPCS | Performed by: INTERNAL MEDICINE

## 2022-03-02 PROCEDURE — G8400 PT W/DXA NO RESULTS DOC: HCPCS | Performed by: INTERNAL MEDICINE

## 2022-03-02 PROCEDURE — 4040F PNEUMOC VAC/ADMIN/RCVD: CPT | Performed by: INTERNAL MEDICINE

## 2022-03-02 PROCEDURE — G8417 CALC BMI ABV UP PARAM F/U: HCPCS | Performed by: INTERNAL MEDICINE

## 2022-03-02 PROCEDURE — 83036 HEMOGLOBIN GLYCOSYLATED A1C: CPT | Performed by: INTERNAL MEDICINE

## 2022-03-02 PROCEDURE — G8484 FLU IMMUNIZE NO ADMIN: HCPCS | Performed by: INTERNAL MEDICINE

## 2022-03-02 PROCEDURE — 1036F TOBACCO NON-USER: CPT | Performed by: INTERNAL MEDICINE

## 2022-03-02 PROCEDURE — 1090F PRES/ABSN URINE INCON ASSESS: CPT | Performed by: INTERNAL MEDICINE

## 2022-03-02 PROCEDURE — 3017F COLORECTAL CA SCREEN DOC REV: CPT | Performed by: INTERNAL MEDICINE

## 2022-03-02 PROCEDURE — 3046F HEMOGLOBIN A1C LEVEL >9.0%: CPT | Performed by: INTERNAL MEDICINE

## 2022-03-02 PROCEDURE — 2022F DILAT RTA XM EVC RTNOPTHY: CPT | Performed by: INTERNAL MEDICINE

## 2022-03-02 PROCEDURE — 1123F ACP DISCUSS/DSCN MKR DOCD: CPT | Performed by: INTERNAL MEDICINE

## 2022-03-02 PROCEDURE — 99214 OFFICE O/P EST MOD 30 MIN: CPT | Performed by: INTERNAL MEDICINE

## 2022-03-02 RX ORDER — FLUOXETINE 10 MG/1
10 CAPSULE ORAL DAILY
Qty: 30 CAPSULE | Refills: 3 | Status: SHIPPED
Start: 2022-03-02 | End: 2022-09-22 | Stop reason: ALTCHOICE

## 2022-03-02 ASSESSMENT — ENCOUNTER SYMPTOMS
VOMITING: 0
SORE THROAT: 0
ABDOMINAL PAIN: 0
VOICE CHANGE: 0
CHEST TIGHTNESS: 0
NAUSEA: 0
WHEEZING: 0
SHORTNESS OF BREATH: 0

## 2022-03-02 NOTE — PROGRESS NOTES
HPI:  Patient comes in today for follow-up on blood sugar blood pressure patient did not get all her blood work done and did not go for physical therapy because of difficulty with her insurance that she was able to resolve. Patient states that she expect her blood sugars to be elevated, she is been regular with dialysis but had a recent surgery for her access. Patient is getting panic attacks states that is been happening every 2 weeks last one happened yesterday in dialysis happened out of nowhere she becomes very panicky and he had to take her off the dialysis. Patient also thought that she was more swollen than usual.  Review of Systems   Constitutional: Negative for chills, fever and unexpected weight change. HENT: Negative for postnasal drip, sore throat and voice change. Respiratory: Negative for chest tightness, shortness of breath and wheezing. Cardiovascular: Positive for leg swelling (Chronic lymphedema). Negative for chest pain. Gastrointestinal: Negative for abdominal pain, nausea and vomiting. Musculoskeletal: Positive for gait problem. Negative for joint swelling. Skin: Negative for rash and wound. Psychiatric/Behavioral: Negative.          Past Medical History:   Diagnosis Date    Brain aneurysm     CLABSI (central line-associated bloodstream infection) 2021    Diabetes mellitus ([facility] [address]     ESRD on hemodialysis ([facility] [address] 2021    H/O heart artery stent     Hyperlipidemia     Hypertension      Past Surgical History:   Procedure Laterality Date    CARDIAC SURGERY       SECTION      DIALYSIS FISTULA CREATION Left 2022    REVISION AV FISTULA LEFT ARM performed by Cece Hdz MD at [place] [address] N/A 2021    EGD BIOPSY performed by Gilles Castaneda MD at [address] [place] [address] N/A 2021    CATHETER INSERTION  TUNNELED HEMODIALYSIS, WITH REMOVAL OF TEMPORARY CATHETER performed by Jovani Mckeon Jenn Santiago MD at [address] [facility] History   Problem Relation Age of Onset    Coronary Art Dis Mother     Coronary Art Dis Father     Coronary Art Dis Brother       Social History     Tobacco Use    Smoking status: Former Smoker     Packs/day: 1.00     Years: 10.00     Pack years: 10.00     Quit date: 2017     Years since quittin.2    Smokeless tobacco: Never Used   Vaping Use    Vaping Use: Never used   Substance Use Topics    Alcohol use: Never    Drug use: Never        Current Outpatient Medications on File Prior to Visit   Medication Sig Dispense Refill    midodrine (PROAMATINE) 10 MG tablet TAKE 1 TABLET BY MOUTH ON TUESDAY, THURSDAY, AND SATURDAY BEFORE DIALYSIS TREATMENT      acetaminophen (TYLENOL) 325 MG tablet Take 650 mg by mouth every 6 hours as needed      calcium acetate (PHOSLO) 667 MG CAPS capsule Take 667 mg by mouth 3 times daily (with meals)      Compression Stockings MISC 2 each by Does not apply route Daily 20-30 mmhg 2 each 0    pantoprazole (PROTONIX) 40 MG tablet Take 1 tablet by mouth every morning (before breakfast) 30 tablet 0    atorvastatin (LIPITOR) 40 MG tablet Take 40 mg by mouth daily      carvedilol (COREG) 12.5 MG tablet Take 6.25 mg by mouth 2 times daily (with meals)       clopidogrel (PLAVIX) 75 MG tablet Take 75 mg by mouth daily      aspirin 81 MG EC tablet Take 81 mg by mouth daily      Insulin Glargine, 2 Unit Dial, (TOUJEO MAX SOLOSTAR) 300 UNIT/ML SOPN Inject 25 Units into the skin daily      insulin aspart (NOVOLOG) 100 UNIT/ML injection vial Inject 3 Units into the skin 3 times daily (before meals)       No current facility-administered medications on file prior to visit.         PE:  VS:  BP (!) 144/62   Pulse 86   Temp 97.1 °F (36.2 °C)   Resp 18   Ht 5' 4\" (1.626 m)   Wt 250 lb (113.4 kg)   SpO2 96%   BMI 42.91 kg/m²   General:  Alert and oriented, NAD  HEENT: Ear auricles are normal, EOMI, Conjunctivae clear  NECK:  Supple without adenopathy or thyromegaly, no carotid bruits. LUNGS:  CTA all fields  HEART:  RRR without M, R, or G  ABDOMEN:  Soft and nontender without palpable abnormalities, No renal or aortic bruits. EXTREMITIES: No ankle edema bilaterally. Neuro: No focal deficit. Lab Results   Component Value Date    WBC 7.0 01/28/2022    HGB 10.9 (L) 01/28/2022    HCT 35.8 01/28/2022     (L) 01/28/2022    ALT <5 01/14/2022    AST 25 01/14/2022     01/28/2022    K 5.1 (H) 01/28/2022     01/28/2022    CREATININE 4.3 (H) 01/28/2022    BUN 35 (H) 01/28/2022    CO2 23 01/28/2022    TSH 1.030 01/12/2022    INR 1.1 01/28/2022        Impression/Plan:    1. Anxiety and depression  -     FLUoxetine (PROZAC) 10 MG capsule; Take 1 capsule by mouth daily, Disp-30 capsule, R-3Normal  2. Cataract of both eyes, unspecified cataract type  -     Martha Salomon MD, Ophthalmology, [place] (On license of [facility])  3. Pulmonary hypertension, unspecified ([facility]  4. ESRD on hemodialysis [facility])  Comments:  Patient counseled regarding high salt content in all animal protein. 5. Diabetes mellitus type 2 with complications (HCC)  -     POCT glycosylated hemoglobin (Hb A1C)  6. Class 3 severe obesity due to excess calories with serious comorbidity and body mass index (BMI) of 40.0 to 44.9 in adult ([address]  7. Mixed hyperlipidemia  8. At high risk for falls  Comments:  Patient will call to physical therapy and schedule and confirm that her insurance covers.

## 2022-03-11 ENCOUNTER — TELEPHONE (OUTPATIENT)
Dept: VASCULAR SURGERY | Age: 67
End: 2022-03-11

## 2022-03-14 ENCOUNTER — OFFICE VISIT (OUTPATIENT)
Dept: VASCULAR SURGERY | Age: 67
End: 2022-03-14

## 2022-03-14 ENCOUNTER — TELEPHONE (OUTPATIENT)
Dept: VASCULAR SURGERY | Age: 67
End: 2022-03-14

## 2022-03-14 VITALS — HEIGHT: 64 IN | BODY MASS INDEX: 42.68 KG/M2 | WEIGHT: 250 LBS

## 2022-03-14 DIAGNOSIS — N18.6 ESRD (END STAGE RENAL DISEASE) (HCC): Primary | ICD-10-CM

## 2022-03-14 PROCEDURE — 99024 POSTOP FOLLOW-UP VISIT: CPT | Performed by: NURSE PRACTITIONER

## 2022-03-14 NOTE — PROGRESS NOTES
3/14/2022    Frank Napier  1955    CC :       Edinson Weller MD  Nephrologist :   Dr. Louisa Wilkerson, NP [address] Escobar : MARINA [facility] AT [facility] JENNIFER Wadley     3/2021 [facility]    7[facility]2021 L [facility] 11/24/21 R subclavian tunn hd cath (R IJ occlusion)   1/28/22 L BC AVF revision, superficialization     Patient returns for post operative evaluation. The patient denies any unexpected problems since the procedure. The wound is healing well. Denies left hand pain. PE  Gen NAD Awake and Alert  left Upper Extremity  · Fistula has palpable thrill, and a bruit is auscultated  · Brachial 2 Radial 2   · Wound is clean, dry and intact  with no evidence of cellulitis or drainage  · No deficits in sensation or motor     A/P Dialysis Access  left brachiocephalic AV fistula  · wound is healing well  · no evidence of steal syndrome  · av access is ready for use  · Will notify dialysis facility  · Tunneled catheter will be removed once arm access is functioning well for dialysis   · I reviewed with the patient that normal activities can be resumed as tolerated  · Return in 6 months for follow-up office visit. Pt seen and plan reviewed with Dr. Monae Hernandez.      TAWNYA Gabriel - CNP

## 2022-08-23 ENCOUNTER — APPOINTMENT (OUTPATIENT)
Dept: CT IMAGING | Age: 67
End: 2022-08-23
Payer: MEDICARE

## 2022-08-23 ENCOUNTER — HOSPITAL ENCOUNTER (EMERGENCY)
Age: 67
Discharge: HOME | End: 2022-08-23
Attending: EMERGENCY MEDICINE
Payer: MEDICARE

## 2022-08-23 VITALS
TEMPERATURE: 97.6 F | HEART RATE: 72 BPM | OXYGEN SATURATION: 100 % | DIASTOLIC BLOOD PRESSURE: 56 MMHG | SYSTOLIC BLOOD PRESSURE: 149 MMHG | HEIGHT: 64 IN | RESPIRATION RATE: 18 BRPM | WEIGHT: 264.55 LBS | BODY MASS INDEX: 45.17 KG/M2

## 2022-08-23 DIAGNOSIS — K92.2 LOWER GI BLEED: Primary | ICD-10-CM

## 2022-08-23 DIAGNOSIS — K57.90 DIVERTICULOSIS: ICD-10-CM

## 2022-08-23 LAB
ALBUMIN SERPL-MCNC: 3.8 G/DL (ref 3.5–5.2)
ALP BLD-CCNC: 97 U/L (ref 35–104)
ALT SERPL-CCNC: 12 U/L (ref 0–32)
ANION GAP SERPL CALCULATED.3IONS-SCNC: 20 MMOL/L (ref 7–16)
APTT: 29 SEC (ref 24.5–35.1)
AST SERPL-CCNC: 12 U/L (ref 0–31)
BASOPHILS ABSOLUTE: 0.04 E9/L (ref 0–0.2)
BASOPHILS RELATIVE PERCENT: 0.4 % (ref 0–2)
BILIRUB SERPL-MCNC: 0.4 MG/DL (ref 0–1.2)
BILIRUBIN DIRECT: <0.2 MG/DL (ref 0–0.3)
BILIRUBIN, INDIRECT: NORMAL MG/DL (ref 0–1)
BUN BLDV-MCNC: 77 MG/DL (ref 6–23)
CALCIUM SERPL-MCNC: 8.4 MG/DL (ref 8.6–10.2)
CHLORIDE BLD-SCNC: 97 MMOL/L (ref 98–107)
CO2: 22 MMOL/L (ref 22–29)
CREAT SERPL-MCNC: 8.4 MG/DL (ref 0.5–1)
EKG ATRIAL RATE: 68 BPM
EKG P AXIS: 21 DEGREES
EKG P-R INTERVAL: 146 MS
EKG Q-T INTERVAL: 476 MS
EKG QRS DURATION: 94 MS
EKG QTC CALCULATION (BAZETT): 506 MS
EKG R AXIS: -10 DEGREES
EKG T AXIS: 14 DEGREES
EKG VENTRICULAR RATE: 68 BPM
EOSINOPHILS ABSOLUTE: 0.41 E9/L (ref 0.05–0.5)
EOSINOPHILS RELATIVE PERCENT: 3.9 % (ref 0–6)
GFR AFRICAN AMERICAN: 6
GFR NON-AFRICAN AMERICAN: 5 ML/MIN/1.73
GLUCOSE BLD-MCNC: 297 MG/DL (ref 74–99)
HCT VFR BLD CALC: 29.8 % (ref 34–48)
HEMOGLOBIN: 9.8 G/DL (ref 11.5–15.5)
IMMATURE GRANULOCYTES #: 0.07 E9/L
IMMATURE GRANULOCYTES %: 0.7 % (ref 0–5)
INR BLD: 1
LACTIC ACID: 1.8 MMOL/L (ref 0.5–2.2)
LIPASE: 112 U/L (ref 13–60)
LYMPHOCYTES ABSOLUTE: 1.09 E9/L (ref 1.5–4)
LYMPHOCYTES RELATIVE PERCENT: 10.2 % (ref 20–42)
MCH RBC QN AUTO: 32.5 PG (ref 26–35)
MCHC RBC AUTO-ENTMCNC: 32.9 % (ref 32–34.5)
MCV RBC AUTO: 98.7 FL (ref 80–99.9)
MONOCYTES ABSOLUTE: 0.5 E9/L (ref 0.1–0.95)
MONOCYTES RELATIVE PERCENT: 4.7 % (ref 2–12)
NEUTROPHILS ABSOLUTE: 8.53 E9/L (ref 1.8–7.3)
NEUTROPHILS RELATIVE PERCENT: 80.1 % (ref 43–80)
PDW BLD-RTO: 14.3 FL (ref 11.5–15)
PLATELET # BLD: 179 E9/L (ref 130–450)
PMV BLD AUTO: 10.7 FL (ref 7–12)
POTASSIUM REFLEX MAGNESIUM: 4.2 MMOL/L (ref 3.5–5)
PROTHROMBIN TIME: 11 SEC (ref 9.3–12.4)
RBC # BLD: 3.02 E12/L (ref 3.5–5.5)
SODIUM BLD-SCNC: 139 MMOL/L (ref 132–146)
TOTAL PROTEIN: 6.8 G/DL (ref 6.4–8.3)
TROPONIN, HIGH SENSITIVITY: 80 NG/L (ref 0–9)
WBC # BLD: 10.6 E9/L (ref 4.5–11.5)

## 2022-08-23 PROCEDURE — 83605 ASSAY OF LACTIC ACID: CPT

## 2022-08-23 PROCEDURE — 99285 EMERGENCY DEPT VISIT HI MDM: CPT

## 2022-08-23 PROCEDURE — 90935 HEMODIALYSIS ONE EVALUATION: CPT

## 2022-08-23 PROCEDURE — 6370000000 HC RX 637 (ALT 250 FOR IP)

## 2022-08-23 PROCEDURE — 83690 ASSAY OF LIPASE: CPT

## 2022-08-23 PROCEDURE — 74177 CT ABD & PELVIS W/CONTRAST: CPT

## 2022-08-23 PROCEDURE — 80076 HEPATIC FUNCTION PANEL: CPT

## 2022-08-23 PROCEDURE — 85730 THROMBOPLASTIN TIME PARTIAL: CPT

## 2022-08-23 PROCEDURE — 85025 COMPLETE CBC W/AUTO DIFF WBC: CPT

## 2022-08-23 PROCEDURE — 6360000004 HC RX CONTRAST MEDICATION: Performed by: RADIOLOGY

## 2022-08-23 PROCEDURE — 80048 BASIC METABOLIC PNL TOTAL CA: CPT

## 2022-08-23 PROCEDURE — 36415 COLL VENOUS BLD VENIPUNCTURE: CPT

## 2022-08-23 PROCEDURE — 93005 ELECTROCARDIOGRAM TRACING: CPT

## 2022-08-23 PROCEDURE — 84484 ASSAY OF TROPONIN QUANT: CPT

## 2022-08-23 PROCEDURE — G0257 UNSCHED DIALYSIS ESRD PT HOS: HCPCS

## 2022-08-23 PROCEDURE — 85610 PROTHROMBIN TIME: CPT

## 2022-08-23 RX ORDER — PANTOPRAZOLE SODIUM 40 MG/1
40 TABLET, DELAYED RELEASE ORAL
Qty: 10 TABLET | Refills: 0 | Status: SHIPPED | OUTPATIENT
Start: 2022-08-23 | End: 2022-09-22 | Stop reason: ALTCHOICE

## 2022-08-23 RX ORDER — PANTOPRAZOLE SODIUM 40 MG/1
40 TABLET, DELAYED RELEASE ORAL ONCE
Status: COMPLETED | OUTPATIENT
Start: 2022-08-23 | End: 2022-08-23

## 2022-08-23 RX ADMIN — LIDOCAINE HYDROCHLORIDE: 20 SOLUTION ORAL; TOPICAL at 12:42

## 2022-08-23 RX ADMIN — IOPAMIDOL 75 ML: 755 INJECTION, SOLUTION INTRAVENOUS at 15:56

## 2022-08-23 RX ADMIN — PANTOPRAZOLE SODIUM 40 MG: 40 TABLET, DELAYED RELEASE ORAL at 12:42

## 2022-08-23 ASSESSMENT — ENCOUNTER SYMPTOMS
NAUSEA: 0
BACK PAIN: 0
BLOOD IN STOOL: 1
COUGH: 0
ABDOMINAL DISTENTION: 1
ABDOMINAL PAIN: 1
SHORTNESS OF BREATH: 0
VOMITING: 0
SINUS PRESSURE: 0
EYE DISCHARGE: 0
CONSTIPATION: 0
DIARRHEA: 0
WHEEZING: 0
EYE REDNESS: 0
PHOTOPHOBIA: 0
SORE THROAT: 0
ANAL BLEEDING: 1

## 2022-08-23 ASSESSMENT — PAIN - FUNCTIONAL ASSESSMENT: PAIN_FUNCTIONAL_ASSESSMENT: NONE - DENIES PAIN

## 2022-08-23 NOTE — FLOWSHEET NOTE
08/23/22 1943   Vital Signs   BP (!) 149/56   Temp 97.6 °F (36.4 °C)   Heart Rate 72   Resp 18   Post-Hemodialysis Assessment   Post-Treatment Procedures Blood returned; Access bleeding time < 10 minutes   Machine Disinfection Process Acid/Vinegar Clean;Heat Disinfect; Exterior Machine Disinfection   Rinseback Volume (ml) 300 ml   Blood Volume Processed (Liters) 62.4 l/min   Dialyzer Clearance Lightly streaked   Duration of Treatment (minutes) 180 minutes   Hemodialysis Intake (ml) 400 ml   Hemodialysis Output (ml) 2000 ml   NET Removed (ml) 1600   Tolerated Treatment Good   Patient Response to Treatment Pt had some cramping at 2.5 hr brisa, decreased ufr/ goal 1600cc net fluid removal   Bilateral Breath Sounds Diminished   Edema Generalized   Edema Generalized +1   RLE Edema +3   LLE Edema +3   Physician Notified No   Time Off 1830   Patient Disposition Return to room

## 2022-08-23 NOTE — ED NOTES
TO DIALYSIS 1611     [facility]  08/23/22 1611

## 2022-08-23 NOTE — DISCHARGE INSTRUCTIONS
Please continue taking your prescribed Protonix. Please call and schedule an appointment at the gastroenterologist office as soon as possible. It is also important to follow-up with your primary care soon as possible to follow-up on your symptoms and to discuss your recent emergency room visit. Please return to the emergency room if you experience severe bleeding with loss of consciousness, severe chest pain, palpitations, severe shortness of breath, or severe abdominal pain.

## 2022-08-23 NOTE — ED NOTES
Dialysis Tuesday, Thursday, Saturday.  [employer]      Venita Berkowitz, JUDY  08/23/22 1009 G Husam Huertas RN  08/23/22 [id number]

## 2022-08-23 NOTE — ED NOTES
Radiology Procedure Waiver   Name: Janeen Hartman  : 1955  MRN: [id number]    Date:  22    Time: 3:49 PM EDT    Benefits of immediately proceeding with Radiology exam(s) without pre-testing outweigh the risks or are not indicated as specified below and therefore the following is/are being waived:    [] Pregnancy test   [] Patients LMP on-time and regular.   [] Patient had Tubal Ligation or has other Contraception Device. [] Patient  is Menopausal or Premenarcheal.    [] Patient had Full or Partial Hysterectomy. [] Protocol for Iodine allergy    [] MRI Questionnaire     [x] BUN/Creatinine   [] Patient age w/no hx of renal dysfunction. [x] Patient on Dialysis. [] Recent Normal Labs.   Electronically signed by Surinder Escobar MD on 22 at 3:49 PM EDT               Surinder Escobar MD  Resident  22 [id number]

## 2022-08-23 NOTE — ED PROVIDER NOTES
[facility]  Department of Emergency Medicine     Written by: Christi Avila MD  Patient Name: Nichole Fitzgerald  Attending Provider: Zach Caruso DO  Admit Date: 2022 11:33 AM  MRN: [id number]                   : 1955        Chief Complaint   Patient presents with    Rectal Bleeding     Started yesterday, c/o weakness and fatigue     - Chief complaint    The patient is a 29-year-old female with a past medical history of type 2 diabetes, end-stage renal disease on hemodialysis T, , S, brain aneurysm, and cardiac stent placement presenting with rectal bleeding. Yesterday the patient had a bowel movement and noticed a large amount of bright red blood in the bowl. She has a history of hemorrhoids in the past and thought that a hemorrhoid had burst and thought it would resolve on its own. She also noted a burning abdominal pain that is nonradiating, located in the middle of the abdomen, mild in severity, not improved by anything, not exacerbated by anything. She is also having increased distention and flatulence. Today the patient woke up at 4:30 AM and noticed significant blood upon standing and call the dialysis center who told her that she should come to the hospital to get evaluated instead of coming to dialysis. The patient believes that the blood is coming out of her rectum but is unsure whether there is blood in her urine. Her last colonoscopy was performed in  in [facility], she was told it was benign and that she would not need another colonoscopy for 10 years. In 2021 the patient received an upper endoscopy which revealed signs of celiac disease. Of note she recently took a trip over the weekend so her last round of hemodialysis was Friday, 2022. She also has chronic low back pain and lower extremity swelling with overlying skin peeling and redness.   Patient denies rectal pain, lightheadedness, dizziness, syncope, dysuria, increasing or decreasing urinary frequency, vaginal bleeding/discharge, vaginal pain, nausea, vomiting, chest pain, shortness of breath, fever, sore throat, cough, headache, neck pain, recent surgery, or sick contacts. Review of Systems   Constitutional:  Negative for activity change, chills, fatigue and fever. HENT:  Negative for congestion, postnasal drip, sinus pressure and sore throat. Eyes:  Negative for photophobia, discharge, redness and visual disturbance. Respiratory:  Negative for cough, shortness of breath and wheezing. Cardiovascular:  Negative for chest pain and leg swelling. Gastrointestinal:  Positive for abdominal distention, abdominal pain, anal bleeding and blood in stool. Negative for constipation, diarrhea, nausea and vomiting. Endocrine: Negative for cold intolerance and heat intolerance. Genitourinary:  Negative for decreased urine volume, difficulty urinating, dysuria, flank pain, frequency, hematuria, urgency, vaginal bleeding and vaginal discharge. Musculoskeletal:  Negative for arthralgias, back pain, joint swelling and myalgias. Skin:  Negative for rash and wound. Allergic/Immunologic: Negative for immunocompromised state. Neurological:  Negative for dizziness, syncope, facial asymmetry, weakness, light-headedness, numbness and headaches. Hematological:  Does not bruise/bleed easily. Psychiatric/Behavioral:  Negative for behavioral problems and hallucinations. Physical Exam  Constitutional:       General: She is not in acute distress. Appearance: Normal appearance. She is not toxic-appearing. HENT:      Head: Normocephalic and atraumatic. Right Ear: Hearing normal.      Left Ear: Hearing normal.      Nose: Nose normal.      Mouth/Throat:      Lips: Pink. Mouth: Mucous membranes are moist.      Pharynx: Oropharynx is clear. Eyes:      Extraocular Movements: Extraocular movements intact.       Conjunctiva/sclera: Conjunctivae normal. Pupils: Pupils are equal, round, and reactive to light. Cardiovascular:      Rate and Rhythm: Normal rate and regular rhythm. Pulses: Normal pulses. Heart sounds: S1 normal and S2 normal.   Pulmonary:      Effort: Pulmonary effort is normal. No respiratory distress. Breath sounds: Normal breath sounds and air entry. Abdominal:      General: Abdomen is flat. Bowel sounds are normal.      Palpations: Abdomen is soft. There is no mass. Tenderness: There is no abdominal tenderness. There is no right CVA tenderness, left CVA tenderness or guarding. Musculoskeletal:         General: No swelling or tenderness. Normal range of motion. Cervical back: Normal range of motion and neck supple. Right lower leg: No edema. Left lower leg: No edema. Skin:     General: Skin is warm and dry. Capillary Refill: Capillary refill takes less than 2 seconds. Findings: No bruising, lesion or rash. Neurological:      General: No focal deficit present. Mental Status: She is alert and oriented to person, place, and time. Mental status is at baseline. Motor: No weakness. Psychiatric:         Attention and Perception: Attention normal.         Mood and Affect: Mood and affect normal.         Behavior: Behavior is cooperative. EKG Interpretation    Interpreted by emergency department physician    Rhythm: normal sinus   Rate: normal  Axis: left  Ectopy: none  Conduction: Prolonged QTC of 506ms  ST Segments: no acute change  T Waves: no acute change  Q Waves: none    Clinical Impression: no acute changes; Left ventricular hypertrophy present on prior EKG on 1/1/2022    Arcelia Berman MD    Procedures       MDM  Number of Diagnoses or Management Options  Diverticulosis  Lower GI bleed  Diagnosis management comments:  The patient is a 67-year-old female with a past medical history of type 2 diabetes, end-stage renal disease, brain aneurysm, and cardiac stent placement presenting with rectal bleeding. At the time of examination the patient is vitally stable. EKG as above. CBC shows anemia with a hemoglobin of 9.8. No need for transfusion at this time. APTT, PT/INR, lactic acid, hepatic function panel were all reassuring. Initial troponin is 80 which is improved from a prior 74. BMP shows elevated BUN and creatinine and hyperglycemia consistent with history of no hemodialysis since Friday. Lipase is elevated but not to the degree suspicious for pancreatitis. Patient will receive hemodialysis so CT with contrast was ordered. CT showed mild diverticulosis. No bowel obstruction free air or focal inflammatory changes. Stable fat-containing umbilical hernia. Patient remains hemodynamically stable. Decision to discharge the patient with outpatient follow-up by gastroenterologist.  She was educated about her condition and demonstrates good understanding. At the time of discharge patient has no questions and was vitally stable. ED Course as of 08/23/22 2021 Tue Aug 23, 2022   1202 Bedside digital rectal exam was Hemoccult positive. Performed with chaperone present [VG]   1950 Patient has returned from dialysis. Patient reassessed and is experiencing relief of her abdominal pain. Patient was educated about her condition and the results of the imaging as well as the lab results. She was told about following up with a gastroenterologist for further evaluation for her GI bleed. She demonstrated good understanding. [VG]      ED Course User Index  [VG] Orlando Richards MD       --------------------------------------------- PAST HISTORY ---------------------------------------------  Past Medical History:  has a past medical history of Brain aneurysm, CLABSI (central line-associated bloodstream infection), Diabetes mellitus ([address] [facility] H/O heart artery stent, Hyperlipidemia, and Hypertension.     Past Surgical History:  has a past surgical history that includes Cardiac surgery;  section; vascular surgery (N/A, 2021); Upper gastrointestinal endoscopy (N/A, 2021); and Dialysis fistula creation (Left, 2022). Social History:  reports that she quit smoking about 4 years ago. She has a 10.00 pack-year smoking history. She has never used smokeless tobacco. She reports that she does not drink alcohol and does not use drugs. Family History: family history includes Coronary Art Dis in her brother, father, and mother. The patients home medications have been reviewed.     Allergies: Sodium hypochlorite, Benzonatate, Morphine, Other, and Pcn [penicillins]    -------------------------------------------------- RESULTS -------------------------------------------------  Labs:  Results for orders placed or performed during the hospital encounter of 22   CBC with Auto Differential   Result Value Ref Range    WBC 10.6 4.5 - 11.5 E9/L    RBC 3.02 (L) 3.50 - 5.50 E12/L    Hemoglobin 9.8 (L) 11.5 - 15.5 g/dL    Hematocrit 29.8 (L) 34.0 - 48.0 %    MCV 98.7 80.0 - 99.9 fL    MCH 32.5 26.0 - 35.0 pg    MCHC 32.9 32.0 - 34.5 %    RDW 14.3 11.5 - 15.0 fL    Platelets 129 462 - 755 E9/L    MPV 10.7 7.0 - 12.0 fL    Neutrophils % 80.1 (H) 43.0 - 80.0 %    Immature Granulocytes % 0.7 0.0 - 5.0 %    Lymphocytes % 10.2 (L) 20.0 - 42.0 %    Monocytes % 4.7 2.0 - 12.0 %    Eosinophils % 3.9 0.0 - 6.0 %    Basophils % 0.4 0.0 - 2.0 %    Neutrophils Absolute 8.53 (H) 1.80 - 7.30 E9/L    Immature Granulocytes # 0.07 E9/L    Lymphocytes Absolute 1.09 (L) 1.50 - 4.00 E9/L    Monocytes Absolute 0.50 0.10 - 0.95 E9/L    Eosinophils Absolute 0.41 0.05 - 0.50 E9/L    Basophils Absolute 0.04 0.00 - 0.20 E5/S   Basic Metabolic Panel w/ Reflex to MG   Result Value Ref Range    Sodium 139 132 - 146 mmol/L    Potassium reflex Magnesium 4.2 3.5 - 5.0 mmol/L    Chloride 97 (L) 98 - 107 mmol/L    CO2 22 22 - 29 mmol/L    Anion Gap 20 (H) 7 - 16 mmol/L    Glucose 297 (H) 74 - 99 mg/dL BUN 77 (H) 6 - 23 mg/dL    Creatinine 8.4 (HH) 0.5 - 1.0 mg/dL    GFR Non-African American 5 >=60 mL/min/1.73    GFR African American 6     Calcium 8.4 (L) 8.6 - 10.2 mg/dL   Hepatic Function Panel   Result Value Ref Range    Total Protein 6.8 6.4 - 8.3 g/dL    Albumin 3.8 3.5 - 5.2 g/dL    Alkaline Phosphatase 97 35 - 104 U/L    ALT 12 0 - 32 U/L    AST 12 0 - 31 U/L    Total Bilirubin 0.4 0.0 - 1.2 mg/dL    Bilirubin, Direct <0.2 0.0 - 0.3 mg/dL    Bilirubin, Indirect see below 0.0 - 1.0 mg/dL   Lipase   Result Value Ref Range    Lipase 112 (H) 13 - 60 U/L   Troponin   Result Value Ref Range    Troponin, High Sensitivity 80 (H) 0 - 9 ng/L   Lactic Acid   Result Value Ref Range    Lactic Acid 1.8 0.5 - 2.2 mmol/L   Protime-INR   Result Value Ref Range    Protime 11.0 9.3 - 12.4 sec    INR 1.0    APTT   Result Value Ref Range    aPTT 29.0 24.5 - 35.1 sec   EKG 12 Lead   Result Value Ref Range    Ventricular Rate 68 BPM    Atrial Rate 68 BPM    P-R Interval 146 ms    QRS Duration 94 ms    Q-T Interval 476 ms    QTc Calculation (Bazett) 506 ms    P Axis 21 degrees    R Axis -10 degrees    T Axis 14 degrees       Radiology:  CT ABDOMEN PELVIS W IV CONTRAST Additional Contrast? None   Final Result   1. Mild diverticulosis. 2. No bowel obstruction, free air, or focal inflammatory changes. 3. Stable fat containing umbilical hernia.             ------------------------- NURSING NOTES AND VITALS REVIEWED ---------------------------  Date / Time Roomed:  8/23/2022 11:33 AM  ED Bed Assignment:  24/24    The nursing notes within the ED encounter and vital signs as below have been reviewed.    BP (!) 149/56   Pulse 72   Temp 97.6 °F (36.4 °C)   Resp 18   Ht 5' 4\" (1.626 m)   Wt 264 lb 8.8 oz (120 kg)   SpO2 100%   BMI 45.41 kg/m²   Oxygen Saturation Interpretation: Normal      ------------------------------------------ PROGRESS NOTES ------------------------------------------  I have spoken with the patient and

## 2022-09-22 ENCOUNTER — APPOINTMENT (OUTPATIENT)
Dept: GENERAL RADIOLOGY | Age: 67
End: 2022-09-22
Payer: MEDICARE

## 2022-09-22 ENCOUNTER — APPOINTMENT (OUTPATIENT)
Dept: CT IMAGING | Age: 67
End: 2022-09-22
Payer: MEDICARE

## 2022-09-22 ENCOUNTER — HOSPITAL ENCOUNTER (EMERGENCY)
Age: 67
Discharge: HOME | End: 2022-09-22
Attending: EMERGENCY MEDICINE
Payer: MEDICARE

## 2022-09-22 VITALS
TEMPERATURE: 97.9 F | HEIGHT: 64 IN | RESPIRATION RATE: 15 BRPM | BODY MASS INDEX: 44.39 KG/M2 | SYSTOLIC BLOOD PRESSURE: 122 MMHG | OXYGEN SATURATION: 97 % | DIASTOLIC BLOOD PRESSURE: 78 MMHG | HEART RATE: 89 BPM | WEIGHT: 260 LBS

## 2022-09-22 DIAGNOSIS — R42 DIZZINESS: Primary | ICD-10-CM

## 2022-09-22 LAB
ALBUMIN SERPL-MCNC: 4 G/DL (ref 3.5–5.2)
ALP BLD-CCNC: 103 U/L (ref 35–104)
ALT SERPL-CCNC: 12 U/L (ref 0–32)
ANION GAP SERPL CALCULATED.3IONS-SCNC: 19 MMOL/L (ref 7–16)
AST SERPL-CCNC: 12 U/L (ref 0–31)
BASOPHILS ABSOLUTE: 0.03 E9/L (ref 0–0.2)
BASOPHILS RELATIVE PERCENT: 0.4 % (ref 0–2)
BILIRUB SERPL-MCNC: 0.3 MG/DL (ref 0–1.2)
BUN BLDV-MCNC: 56 MG/DL (ref 6–23)
CALCIUM SERPL-MCNC: 8.4 MG/DL (ref 8.6–10.2)
CHLORIDE BLD-SCNC: 94 MMOL/L (ref 98–107)
CO2: 25 MMOL/L (ref 22–29)
CREAT SERPL-MCNC: 6.3 MG/DL (ref 0.5–1)
EKG ATRIAL RATE: 89 BPM
EKG P-R INTERVAL: 200 MS
EKG Q-T INTERVAL: 414 MS
EKG QRS DURATION: 92 MS
EKG QTC CALCULATION (BAZETT): 503 MS
EKG R AXIS: -15 DEGREES
EKG T AXIS: 29 DEGREES
EKG VENTRICULAR RATE: 89 BPM
EOSINOPHILS ABSOLUTE: 0.5 E9/L (ref 0.05–0.5)
EOSINOPHILS RELATIVE PERCENT: 6.2 % (ref 0–6)
GFR AFRICAN AMERICAN: 8
GFR NON-AFRICAN AMERICAN: 7 ML/MIN/1.73
GLUCOSE BLD-MCNC: 341 MG/DL (ref 74–99)
HCT VFR BLD CALC: 30.4 % (ref 34–48)
HEMOGLOBIN: 10.1 G/DL (ref 11.5–15.5)
IMMATURE GRANULOCYTES #: 0.03 E9/L
IMMATURE GRANULOCYTES %: 0.4 % (ref 0–5)
LYMPHOCYTES ABSOLUTE: 1.08 E9/L (ref 1.5–4)
LYMPHOCYTES RELATIVE PERCENT: 13.4 % (ref 20–42)
MAGNESIUM: 2.3 MG/DL (ref 1.6–2.6)
MCH RBC QN AUTO: 32.7 PG (ref 26–35)
MCHC RBC AUTO-ENTMCNC: 33.2 % (ref 32–34.5)
MCV RBC AUTO: 98.4 FL (ref 80–99.9)
MONOCYTES ABSOLUTE: 0.47 E9/L (ref 0.1–0.95)
MONOCYTES RELATIVE PERCENT: 5.8 % (ref 2–12)
NEUTROPHILS ABSOLUTE: 5.94 E9/L (ref 1.8–7.3)
NEUTROPHILS RELATIVE PERCENT: 73.8 % (ref 43–80)
PDW BLD-RTO: 14.3 FL (ref 11.5–15)
PLATELET # BLD: 176 E9/L (ref 130–450)
PMV BLD AUTO: 10.5 FL (ref 7–12)
POTASSIUM SERPL-SCNC: 4.4 MMOL/L (ref 3.5–5)
RBC # BLD: 3.09 E12/L (ref 3.5–5.5)
SARS-COV-2, NAAT: NOT DETECTED
SODIUM BLD-SCNC: 138 MMOL/L (ref 132–146)
TOTAL PROTEIN: 7.2 G/DL (ref 6.4–8.3)
TROPONIN, HIGH SENSITIVITY: 67 NG/L (ref 0–9)
TROPONIN, HIGH SENSITIVITY: 73 NG/L (ref 0–9)
WBC # BLD: 8.1 E9/L (ref 4.5–11.5)

## 2022-09-22 PROCEDURE — 70450 CT HEAD/BRAIN W/O DYE: CPT

## 2022-09-22 PROCEDURE — 84484 ASSAY OF TROPONIN QUANT: CPT

## 2022-09-22 PROCEDURE — 6360000002 HC RX W HCPCS

## 2022-09-22 PROCEDURE — 96374 THER/PROPH/DIAG INJ IV PUSH: CPT

## 2022-09-22 PROCEDURE — 2580000003 HC RX 258

## 2022-09-22 PROCEDURE — 93005 ELECTROCARDIOGRAM TRACING: CPT | Performed by: PHYSICIAN ASSISTANT

## 2022-09-22 PROCEDURE — 99285 EMERGENCY DEPT VISIT HI MDM: CPT

## 2022-09-22 PROCEDURE — 71045 X-RAY EXAM CHEST 1 VIEW: CPT

## 2022-09-22 PROCEDURE — 80053 COMPREHEN METABOLIC PANEL: CPT

## 2022-09-22 PROCEDURE — 87635 SARS-COV-2 COVID-19 AMP PRB: CPT

## 2022-09-22 PROCEDURE — 83735 ASSAY OF MAGNESIUM: CPT

## 2022-09-22 PROCEDURE — 85025 COMPLETE CBC W/AUTO DIFF WBC: CPT

## 2022-09-22 PROCEDURE — 6370000000 HC RX 637 (ALT 250 FOR IP)

## 2022-09-22 RX ORDER — ONDANSETRON 4 MG/1
4 TABLET, FILM COATED ORAL EVERY 8 HOURS PRN
Qty: 12 TABLET | Refills: 0 | Status: SHIPPED | OUTPATIENT
Start: 2022-09-22 | End: 2022-10-03

## 2022-09-22 RX ORDER — ONDANSETRON 2 MG/ML
4 INJECTION INTRAMUSCULAR; INTRAVENOUS ONCE
Status: COMPLETED | OUTPATIENT
Start: 2022-09-22 | End: 2022-09-22

## 2022-09-22 RX ORDER — SENNOSIDES 8.6 MG
650 CAPSULE ORAL EVERY 8 HOURS PRN
COMMUNITY

## 2022-09-22 RX ORDER — 0.9 % SODIUM CHLORIDE 0.9 %
250 INTRAVENOUS SOLUTION INTRAVENOUS ONCE
Status: COMPLETED | OUTPATIENT
Start: 2022-09-22 | End: 2022-09-22

## 2022-09-22 RX ORDER — MECLIZINE HCL 12.5 MG/1
12.5 TABLET ORAL ONCE
Status: COMPLETED | OUTPATIENT
Start: 2022-09-22 | End: 2022-09-22

## 2022-09-22 RX ORDER — MECLIZINE HCL 12.5 MG/1
12.5 TABLET ORAL 3 TIMES DAILY PRN
Qty: 15 TABLET | Refills: 0 | Status: SHIPPED | OUTPATIENT
Start: 2022-09-22 | End: 2022-10-02

## 2022-09-22 RX ORDER — SODIUM CHLORIDE 0.9 % (FLUSH) 0.9 %
SYRINGE (ML) INJECTION
Status: DISCONTINUED
Start: 2022-09-22 | End: 2022-09-22 | Stop reason: HOSPADM

## 2022-09-22 RX ADMIN — MECLIZINE 12.5 MG: 12.5 TABLET ORAL at 13:46

## 2022-09-22 RX ADMIN — SODIUM CHLORIDE 250 ML: 9 INJECTION, SOLUTION INTRAVENOUS at 13:45

## 2022-09-22 RX ADMIN — ONDANSETRON 4 MG: 2 INJECTION INTRAMUSCULAR; INTRAVENOUS at 13:46

## 2022-09-22 ASSESSMENT — ENCOUNTER SYMPTOMS
VOMITING: 1
NAUSEA: 1
WHEEZING: 0
ABDOMINAL DISTENTION: 0
BACK PAIN: 0
EYE DISCHARGE: 0
COLOR CHANGE: 0
EYE ITCHING: 0
APNEA: 0
STRIDOR: 0
ABDOMINAL PAIN: 0
SHORTNESS OF BREATH: 0
COUGH: 1

## 2022-09-22 ASSESSMENT — PAIN DESCRIPTION - LOCATION: LOCATION: NECK

## 2022-09-22 ASSESSMENT — PAIN - FUNCTIONAL ASSESSMENT: PAIN_FUNCTIONAL_ASSESSMENT: 0-10

## 2022-09-22 NOTE — ED PROVIDER NOTES
difficulty. Hematological:  Negative for adenopathy. Does not bruise/bleed easily. Psychiatric/Behavioral:  Negative for agitation and behavioral problems. Physical Exam  Constitutional:       General: She is not in acute distress. Appearance: She is obese. She is not ill-appearing or toxic-appearing. HENT:      Head: Normocephalic and atraumatic. Right Ear: External ear normal.      Left Ear: External ear normal.      Nose: Nose normal.      Mouth/Throat:      Mouth: Mucous membranes are moist.      Pharynx: Oropharynx is clear. Eyes:      Extraocular Movements: Extraocular movements intact. Pupils: Pupils are equal, round, and reactive to light. Cardiovascular:      Rate and Rhythm: Normal rate and regular rhythm. Pulses: Normal pulses. Heart sounds: Normal heart sounds. Pulmonary:      Effort: Pulmonary effort is normal.      Breath sounds: Normal breath sounds. Abdominal:      General: There is no distension. Palpations: Abdomen is soft. Tenderness: There is no abdominal tenderness. Musculoskeletal:         General: Normal range of motion. Cervical back: Normal range of motion and neck supple. Skin:     General: Skin is warm and dry. Capillary Refill: Capillary refill takes less than 2 seconds. Neurological:      General: No focal deficit present. Mental Status: She is alert and oriented to person, place, and time. Cranial Nerves: No cranial nerve deficit. Motor: No weakness. Psychiatric:         Mood and Affect: Mood normal.         Thought Content: Thought content normal.        Procedures     MDM  Number of Diagnoses or Management Options  Dizziness  Diagnosis management comments: This is a 71-year-old female ESRD on dialysis that presented to the ER for dizziness. Upon evaluation she was AOx4, cooperative, afebrile, hemodynamically stable, completely neurovascular intact.   HINTS exam was normal.  CT of the head without contrast showed no acute intracranial abnormality, chest x-ray showed no acute cardiopulmonary process. Laboratory work-up was unremarkable and within acceptable limits for her ESRD. Spoke with patient's nephrologist, Dr. Heide Rouse, he also evaluated the labs and also reported that patient was clear for receiving HD tomorrow on 9/23/2022 at 5:45 AM.  Patient's potassium and other electrolytes were normal.  Patient was given meclizine and Zofran and 250 mL bolus of normal saline she reported feeling much better after receiving this antinausea and dry dizziness medicine. Patient's daughter was with her for transport home. Patient completely neurovascularly intact. Return precautions ER given. Patient medically clear for discharge home. She was given prescription of meclizine and Zofran. All of her questions and concerns answered. Patient was stable at time of discharge. ED Course as of 09/22/22 1611   Thu Sep 22, 2022   1330 HINTS Exam:   Head Impulse: Normal   Nystagmus: No direction changing or vertical nystagmus. There is horizontal nystagmus   Test of Skew: Occular movement/correction intact. [JR]   [phone] with patient's nephrologist, Dr. Heide Rouse, he reports that patient is stable from nephrology standpoint to get dialysis tomorrow. The dialysis center is closing at this time but her lab work is reassuring and clinical picture is stable for HD on 9/23/2022. [JR]   [phone] Patient's nephrologist, Dr. Heide Rouse, called back again and reported that he wants her at dialysis treatment center on 9/23/2022 at [phone] for her HD treatment. [JR]   1130 EKG: This EKG is signed and interpreted by me and attending physician, Dr. Trisha Diaz.     Rate: 89  Rhythm: Sinus  Interpretation: no acute changes  Comparison: stable as compared to patient's most recent EKG  [JR]      ED Course User Index  [JR] Joy Nagel DO      ED Course as of 09/22/22 1611 Thu Sep 22, 2022   1330 HINTS Exam:   Head Impulse: Normal Nystagmus: No direction changing or vertical nystagmus. There is horizontal nystagmus   Test of Skew: Occular movement/correction intact. [JR]   [phone] with patient's nephrologist, Dr. Vale Andujar, he reports that patient is stable from nephrology standpoint to get dialysis tomorrow. The dialysis center is closing at this time but her lab work is reassuring and clinical picture is stable for HD on 2022. [JR]   [phone] Patient's nephrologist, Dr. Vale Andujar, called back again and reported that he wants her at dialysis treatment center on 2022 at [phone] for her HD treatment. [JR]   5931 EKG: This EKG is signed and interpreted by me and attending physician, Dr. Steven Rodas. Rate: 89  Rhythm: Sinus  Interpretation: no acute changes  Comparison: stable as compared to patient's most recent EKG  [JR]      ED Course User Index  [JR] Jose Flowers, DO       --------------------------------------------- PAST HISTORY ---------------------------------------------  Past Medical History:  has a past medical history of Anemia in CKD (chronic kidney disease), Anxiety and depression, At high risk for falls, Brain aneurysm, CLABSI (central line-associated bloodstream infection), Cough, Diabetes mellitus ([address] Diabetes mellitus type 2 with complications ([address] Dizziness on standing, ESRD (end stage renal disease) ([address] ESRD on hemodialysis ([address] Essential hypertension, Fever, unspecified, H/O heart artery stent, History of Clostridioides difficile colitis, Hyperlipidemia, and Hypertension. Past Surgical History:  has a past surgical history that includes Cardiac surgery;  section; vascular surgery (N/A, 2021); Upper gastrointestinal endoscopy (N/A, 2021); and Dialysis fistula creation (Left, 2022). Social History:  reports that she quit smoking about 4 years ago. She has a 10.00 pack-year smoking history.  She has never used smokeless tobacco. She reports that she does not drink alcohol and does not use drugs. Family History: family history includes Coronary Art Dis in her brother, father, and mother. The patients home medications have been reviewed.     Allergies: Benzonatate, Morphine, Pcn [penicillins], and Sodium hypochlorite    -------------------------------------------------- RESULTS -------------------------------------------------  Labs:  Results for orders placed or performed during the hospital encounter of 09/22/22   COVID-19, Rapid    Specimen: Nasopharyngeal Swab   Result Value Ref Range    SARS-CoV-2, NAAT Not Detected Not Detected   CBC with Auto Differential   Result Value Ref Range    WBC 8.1 4.5 - 11.5 E9/L    RBC 3.09 (L) 3.50 - 5.50 E12/L    Hemoglobin 10.1 (L) 11.5 - 15.5 g/dL    Hematocrit 30.4 (L) 34.0 - 48.0 %    MCV 98.4 80.0 - 99.9 fL    MCH 32.7 26.0 - 35.0 pg    MCHC 33.2 32.0 - 34.5 %    RDW 14.3 11.5 - 15.0 fL    Platelets 269 315 - 967 E9/L    MPV 10.5 7.0 - 12.0 fL    Neutrophils % 73.8 43.0 - 80.0 %    Immature Granulocytes % 0.4 0.0 - 5.0 %    Lymphocytes % 13.4 (L) 20.0 - 42.0 %    Monocytes % 5.8 2.0 - 12.0 %    Eosinophils % 6.2 (H) 0.0 - 6.0 %    Basophils % 0.4 0.0 - 2.0 %    Neutrophils Absolute 5.94 1.80 - 7.30 E9/L    Immature Granulocytes # 0.03 E9/L    Lymphocytes Absolute 1.08 (L) 1.50 - 4.00 E9/L    Monocytes Absolute 0.47 0.10 - 0.95 E9/L    Eosinophils Absolute 0.50 0.05 - 0.50 E9/L    Basophils Absolute 0.03 0.00 - 0.20 E9/L   Comprehensive Metabolic Panel   Result Value Ref Range    Sodium 138 132 - 146 mmol/L    Potassium 4.4 3.5 - 5.0 mmol/L    Chloride 94 (L) 98 - 107 mmol/L    CO2 25 22 - 29 mmol/L    Anion Gap 19 (H) 7 - 16 mmol/L    Glucose 341 (H) 74 - 99 mg/dL    BUN 56 (H) 6 - 23 mg/dL    Creatinine 6.3 (H) 0.5 - 1.0 mg/dL    GFR Non-African American 7 >=60 mL/min/1.73    GFR African American 8     Calcium 8.4 (L) 8.6 - 10.2 mg/dL    Total Protein 7.2 6.4 - 8.3 g/dL    Albumin 4.0 3.5 - 5.2 g/dL    Total Bilirubin 0.3 0.0 - 1.2 mg/dL Alkaline Phosphatase 103 35 - 104 U/L    ALT 12 0 - 32 U/L    AST 12 0 - 31 U/L   Troponin   Result Value Ref Range    Troponin, High Sensitivity 67 (H) 0 - 9 ng/L   Magnesium   Result Value Ref Range    Magnesium 2.3 1.6 - 2.6 mg/dL   Troponin   Result Value Ref Range    Troponin, High Sensitivity 73 (H) 0 - 9 ng/L   EKG 12 Lead   Result Value Ref Range    Ventricular Rate 89 BPM    Atrial Rate 89 BPM    P-R Interval 200 ms    QRS Duration 92 ms    Q-T Interval 414 ms    QTc Calculation (Bazett) 503 ms    R Axis -15 degrees    T Axis 29 degrees       Radiology:  XR CHEST PORTABLE   Final Result   Cardiomegaly. There are no findings of failure or pneumonia. CT HEAD WO CONTRAST   Final Result   No acute intracranial abnormality. Specifically, there is no acute   intracranial hemorrhage             ------------------------- NURSING NOTES AND VITALS REVIEWED ---------------------------  Date / Time Roomed:  9/22/2022 12:07 PM  ED Bed Assignment:  24/24    The nursing notes within the ED encounter and vital signs as below have been reviewed. /78   Pulse 89   Temp 97.9 °F (36.6 °C) (Temporal)   Resp 15   Ht 5' 4\" (1.626 m)   Wt 260 lb (117.9 kg)   SpO2 97%   BMI 44.63 kg/m²   Oxygen Saturation Interpretation: Normal      ------------------------------------------ PROGRESS NOTES ------------------------------------------  4:09 PM EDT  I have spoken with the patient and discussed todays results, in addition to providing specific details for the plan of care and counseling regarding the diagnosis and prognosis. Their questions are answered at this time and they are agreeable with the plan. I discussed at length with them reasons for immediate return here for re evaluation. They will followup with their  Nephrologist tomorrow  by calling their office tomorrow.       --------------------------------- ADDITIONAL PROVIDER NOTES ---------------------------------  At this time the patient is without objective evidence of an acute process requiring hospitalization or inpatient management. They have remained hemodynamically stable throughout their entire ED visit and are stable for discharge with outpatient follow-up. The plan has been discussed in detail and they are aware of the specific conditions for emergent return, as well as the importance of follow-up. New Prescriptions    MECLIZINE (ANTIVERT) 12.5 MG TABLET    Take 1 tablet by mouth 3 times daily as needed for Dizziness    ONDANSETRON (ZOFRAN) 4 MG TABLET    Take 1 tablet by mouth every 8 hours as needed for Nausea or Vomiting       Diagnosis:  1. Dizziness        Disposition:  Patient's disposition: Discharge to home  Patient's condition is stable.          Vincent Tay,   Resident  09/22/22 6804

## 2022-09-22 NOTE — ED NOTES
Department of Emergency Medicine  FIRST PROVIDER TRIAGE NOTE             Independent MLP           9/22/22  12:05 PM EDT    Date of Encounter: 9/22/22   MRN: [id number]      HPI: Jadyn Clayton is a 78 y.o. female who presents to the ED for Dizziness (Dizziness started this morning, weakness on R side)     Patient is a 43-year-old presenting with dizziness that started last night. Patient stated it worsened around 2:30 in the morning and started having vomiting. Patient states she called her daughter. Patient states the episode lightened. Patient states she woke up this morning and it persisted. Patient states that she feels \"off balance. \"  Patient states she has history of vertigo. Patient is a dialysis patient states that she does get nauseated and had vomiting when she is due for dialysis. Patient states that she is due for dialysis today and did not go because of the dizziness and nausea. Patient states her dialysis days are Tuesday, Thursdays and Saturdays. Last known well 8:30 PM last    ROS: Negative for cp, sob, fever, cough, diarrhea, or rash. PE: Gen Appearance/Constitutional: alert  HEENT: NC/NT. PERRLA,  Airway patent. Neck: supple     Initial Plan of Care: All treatment areas with department are currently occupied. Plan to order/Initiate the following while awaiting opening in ED: labs, EKG, imaging studies, and COVID-19 testing.   Initiate Treatment-Testing, Proceed toTreatment Area When Bed Available for ED Attending/MLP to Continue Care    Electronically signed by Kay Galvez PA-C   DD: 9/22/22       Kay Galvez PA-C  09/22/22 0471

## 2022-09-22 NOTE — DISCHARGE INSTRUCTIONS
Thank you for letting us take care of you today. Your laboratory work-up and imaging was normal.  You have been prescribed meclizine and Zofran to help with your dizziness and nausea. Please make sure to go to your normal dialysis appointment on 9/23/2022 at 5:45 AM.  Your nephrologist is expecting you to be there. Return to the ER via any worsening symptoms.

## 2022-09-30 ENCOUNTER — TELEPHONE (OUTPATIENT)
Dept: VASCULAR SURGERY | Age: 67
End: 2022-09-30

## 2022-10-03 ENCOUNTER — OFFICE VISIT (OUTPATIENT)
Dept: VASCULAR SURGERY | Age: 67
End: 2022-10-03
Payer: MEDICARE

## 2022-10-03 VITALS — HEIGHT: 64 IN | BODY MASS INDEX: 44.39 KG/M2 | WEIGHT: 260 LBS

## 2022-10-03 DIAGNOSIS — Z99.2 ENCOUNTER REGARDING VASCULAR ACCESS FOR DIALYSIS FOR END-STAGE RENAL DISEASE (HCC): ICD-10-CM

## 2022-10-03 DIAGNOSIS — N18.6 ESRD (END STAGE RENAL DISEASE) (HCC): Primary | ICD-10-CM

## 2022-10-03 DIAGNOSIS — N18.6 ENCOUNTER REGARDING VASCULAR ACCESS FOR DIALYSIS FOR END-STAGE RENAL DISEASE (HCC): ICD-10-CM

## 2022-10-03 PROCEDURE — 1036F TOBACCO NON-USER: CPT | Performed by: PHYSICIAN ASSISTANT

## 2022-10-03 PROCEDURE — G8417 CALC BMI ABV UP PARAM F/U: HCPCS | Performed by: PHYSICIAN ASSISTANT

## 2022-10-03 PROCEDURE — 99213 OFFICE O/P EST LOW 20 MIN: CPT | Performed by: PHYSICIAN ASSISTANT

## 2022-10-03 PROCEDURE — 1090F PRES/ABSN URINE INCON ASSESS: CPT | Performed by: PHYSICIAN ASSISTANT

## 2022-10-03 PROCEDURE — G8484 FLU IMMUNIZE NO ADMIN: HCPCS | Performed by: PHYSICIAN ASSISTANT

## 2022-10-03 PROCEDURE — G8400 PT W/DXA NO RESULTS DOC: HCPCS | Performed by: PHYSICIAN ASSISTANT

## 2022-10-03 PROCEDURE — 1124F ACP DISCUSS-NO DSCNMKR DOCD: CPT | Performed by: PHYSICIAN ASSISTANT

## 2022-10-03 PROCEDURE — 3017F COLORECTAL CA SCREEN DOC REV: CPT | Performed by: PHYSICIAN ASSISTANT

## 2022-10-03 PROCEDURE — G8427 DOCREV CUR MEDS BY ELIG CLIN: HCPCS | Performed by: PHYSICIAN ASSISTANT

## 2022-10-03 RX ORDER — ATORVASTATIN CALCIUM 40 MG/1
40 TABLET, FILM COATED ORAL DAILY
COMMUNITY

## 2022-10-03 RX ORDER — ASPIRIN 81 MG/1
81 TABLET ORAL DAILY
COMMUNITY

## 2022-10-03 NOTE — PROGRESS NOTES
Vascular Surgery Outpatient Followup     PCP : Kristen Sterling MD  Nephrologist :   Dr. Dianne Yeung, NP [address] : [facility]    3/2021 R IJ tunn hd catheter Alfonsoli Carrillo    2021 L BC AVF - [facility]   21 R subclavian tunn hd cath (R IJ occlusion)   22 L BC AVF revision, superficialization     HISTORY OF PRESENT ILLNESS:    The patient is a 78 y.o. female who is here in regards to follow up of their previously placed L BC AVF. She states it is working very well for dialysis. She has no prolonged bleeding or decreased flow rates. The patient is Right Hand Dominant.     Past Medical History:        Diagnosis Date    Anemia in CKD (chronic kidney disease) 2021    Anxiety and depression 2022    At high risk for falls 2022    Brain aneurysm     CLABSI (central line-associated bloodstream infection) 2021    Cough 2022    Diabetes mellitus ([address]     Diabetes mellitus type 2 with complications ([address]     Dizziness on standing 2022    ESRD (end stage renal disease) ([address] 2021    ESRD on hemodialysis ([address] 2021    Essential hypertension 2021    Fever, unspecified     Gastrointestinal hemorrhage 2021    H/O heart artery stent     History of Clostridioides difficile colitis 2022    Hyperlipidemia     Hypertension     MSSA bacteremia 2021    Nausea & vomiting 2021    Obesity, Class III, BMI 40-49.9 (morbid obesity) ([address]     Periumbilical abdominal pain      Past Surgical History:        Procedure Laterality Date    CARDIAC SURGERY       SECTION      DIALYSIS FISTULA CREATION Left 2022    REVISION AV FISTULA LEFT ARM performed by Andre Pool MD at [address] [place] [address] N/A 2021    EGD BIOPSY performed by Judy Parker MD at [address] [place] [address] N/A 2021    CATHETER INSERTION  TUNNELED HEMODIALYSIS, WITH REMOVAL OF TEMPORARY CATHETER performed by Peyton Moser MD at [facility], [place], operated by [facility] OR     Current Medications:   Current Outpatient Medications   Medication Sig Dispense Refill    atorvastatin (LIPITOR) 40 MG tablet Take 40 mg by mouth daily      aspirin 81 MG EC tablet Take 81 mg by mouth daily      acetaminophen (TYLENOL) 650 MG extended release tablet Take 650 mg by mouth every 8 hours as needed for Pain      midodrine (PROAMATINE) 10 MG tablet Take 10 mg by mouth daily as needed (AT DIALYSIS FOR LOW BP)      calcium acetate (PHOSLO) 667 MG CAPS capsule Take 667-2,001 mg by mouth 3 times daily (with meals)       No current facility-administered medications for this visit. Allergies:  Benzonatate, Morphine, Pcn [penicillins], and Sodium hypochlorite  Social History     Socioeconomic History    Marital status:       Spouse name: Not on file    Number of children: Not on file    Years of education: Not on file    Highest education level: Not on file   Occupational History    Not on file   Tobacco Use    Smoking status: Former     Packs/day: 1.00     Years: 10.00     Pack years: 10.00     Types: Cigarettes     Quit date: 2017     Years since quittin.8    Smokeless tobacco: Never   Vaping Use    Vaping Use: Never used   Substance and Sexual Activity    Alcohol use: Never    Drug use: Never    Sexual activity: Not Currently   Other Topics Concern    Not on file   Social History Narrative    Not on file     Social Determinants of Health     Financial Resource Strain: Low Risk     Difficulty of Paying Living Expenses: Not hard at all   Food Insecurity: No Food Insecurity    Worried About Running Out of Food in the Last Year: Never true    Ran Out of Food in the Last Year: Never true   Transportation Needs: Not on file   Physical Activity: Not on file   Stress: Not on file   Social Connections: Not on file   Intimate Partner Violence: Not on file   Housing Stability: Not on file     Family History   Problem Relation Age of Onset Coronary Art Dis Mother     Coronary Art Dis Father     Coronary Art Dis Brother      Labs  Lab Results   Component Value Date    WBC 8.1 09/22/2022    HGB 10.1 (L) 09/22/2022    HCT 30.4 (L) 09/22/2022     09/22/2022    PROTIME 11.0 08/23/2022    INR 1.0 08/23/2022    APTT 29.0 08/23/2022    K 4.4 09/22/2022    BUN 56 (H) 09/22/2022    CREATININE 6.3 (H) 09/22/2022     PHYSICAL EXAM:    CONSTITUTIONAL:   Awake, alert, cooperative  PSYCHIATRIC :  Oriented to time, place and person     Appropriate insight to disease process  EYES: Lids and lashes normal  ENT:  External ears and nose without lesions   Hearing deficits absent  NECK: Supple, symmetrical, trachea midline  LUNGS:  No increased work of breathing                 Clear to auscultation  CARDIOVASCULAR:  regular rate and rhythm   ABDOMEN:  soft, non-distended, non-tender  SKIN:   Normal skin color   Texture and turgor normal, no induration  EXTREMITIES:   Left UE  Thrill present  Bruit present  Edema absent  Incisions well healed  Left brachial 2   Left radial 2   Left ulnar 1   R LE Edema absent  L LE Edema absent    A/P Hx of previous Left upper extremity BC arteriovenous fistula  pt's access is functioning well  they are using it for dialysis  they are not experiencing symptoms of steal syndrome  f/u in 6 months for followup  Patient understands to call with any issues related to their access    Pt seen and plan reviewed with Dr. Melanie Ayoub.      Bruno Barclay PA-C

## 2022-10-26 NOTE — PROGRESS NOTES
[facility] [place]  Progress Note - Dorothy Muller 1955, 60 y o  female MRN: [id number]  Unit/Bed#: -01 Encounter: [id number]  Primary Care Provider: Marcial Parisi DO   Date and time admitted to hospital: 3/15/2021  7:09 PM    Foot ulcer, left ([address] )  Assessment & Plan  · Patient with ulcer on left heel of the foot  · Patient is unaware when ulcer started but notes the rash on her lower extremities began about 6 weeks ago  · Patient reports increased pain with touch and walking over the past week  · Patient with poor compliance of diabetes insulin  · Patient completed the course of cefepime  · XR left foot completed on 03/15 with calcaneal spurring, no acute osseous abnormality  · Podiatry consult and recommendations appreciated  · Wound care and dressing changes per Podiatry recommendations    Acute kidney injury superimposed on chronic kidney disease [facility])  Assessment & Plan  Lab Results   Component Value Date    EGFR 12 04/04/2021    EGFR 9 04/03/2021    EGFR 10 04/02/2021    CREATININE 3 65 (H) 04/04/2021    CREATININE 4 83 (H) 04/03/2021    CREATININE 4 31 (H) 04/02/2021     Patient has stage IV chronic disease with baseline creatinine between 1 49-2  15  She presented with acute kidney injury  She has nephrotic range proteinuria  Nephrology was consulted  Patient underwent renal biopsy on March 25th  She denies any abdominal pain or signs of bleeding  Patient creatinine was 6 16 on 03/30  Creatinine this morning is 3 65  Patient was on Bumex drip which was discontinued by Nephrology when patient was started on dialysis  Renal biopsy results showed severe nodular diabetic glomerular sclerosis with severe tubular atrophy and interstitial fibrosis with severe arterial sclerosis and atherosclerosis with hyalinosis    Patient had dialysis catheter placed by IR and underwent dialysis yesterday on 03/30/2021 and 03/31/2021 and 4/01/2021  Patient is set up for dialysis as outpatient with Froylan Campbell on Tuesday, Thursday and Saturday  COVID test is ordered  Patient got dialyzed on Saturday 4/3  On 4 L of supplemental oxygen  Wean as tolerated  Chest x-ray shows increased vascular congestion  Family requested patient to be reevaluated by PT OT and PT saw patient on 4/3 and recommended skilled nursing rehab  Will await case management for skilled nursing rehab placement    Non compliance w medication regimen  Assessment & Plan  · Patient reports she stopped taking her insulin around Thanksgiving 2020, her torsemide about 1 week ago and has not been seeing wound care for her legs,   · Patient had been seeing wound care for wounds of her bilateral lower extremities but stopped going due to the fear of jennifer Covid-19  · Case Management on board    Cellulitis of both lower extremities  Assessment & Plan  Patient admitted with bilateral lower extremity cellulitis and was treated with IV cefepime and vancomycin  Cellulitis resolved  Anemia due to stage 4 chronic kidney disease [facility])  Assessment & Plan  Patient has normocytic anemia  Iron studies revealed anemia of chronic disease  Patient denies any signs of bleeding  Patient declined blood transfusion earlier this this week when hemoglobin was 6 7  Discussed with patient and she had declined blood transfusion and wanted to monitor the H&H  Hemoglobin on 04/03 was 6 4  Discussed with patient and she is in agreement for transfusion this morning  Patient received 1 unit of packed red cells transfusion  Hemoglobin this morning is 7 3  No active or overt rectal bleeding  Patient received Venofer  She has no abdominal tenderness and denies abdominal pain    Patient is on subcutaneous heparin for DVT prevention purposes    Type 2 diabetes mellitus with stage 4 chronic kidney disease, with long-term current use of insulin [facility])  Assessment & Plan  Lab Results   Component Value Date    HGBA1C 7 6 (H) 03/15/2021       Recent Labs 04/03/21  1058 04/03/21  1539 04/03/21  2114 04/04/21  0733   POCGLU 169* 151* 129 151*       Blood Sugar Average: Last 72 hrs:  · (P) 471 [id number]     Patient has type 2 diabetes on insulin with stage IV chronic disease  Continue Humalog 3 units before meals, and Lantus 25 units subcu daily   Coronary artery disease involving native coronary artery of native heart without angina pectoris  Assessment & Plan  Patient has coronary disease and she status post stent placements in 2018  She denies any chest pain or shortness of breath currently  Continue on aspirin and Plavix  Continue atorvastatin    Essential hypertension  Assessment & Plan  · Continue home carvedilol 25 mg b i d  · Hydralazine was discontinued  · Continue to monitor blood pressure per unit protocol    Class 3 severe obesity with body mass index (BMI) of 45 0 to 49 9 in adult [facility])  Assessment & Plan  Body mass index is 47 65 kg/m²  · Encourage lifestyle changes  · Consult nutrition    * Acute on chronic combined systolic and diastolic congestive heart failure (HCC)  Assessment & Plan  Wt Readings from Last 3 Encounters:   04/02/21 126 kg (277 lb 9 6 oz)   02/21/20 117 kg (258 lb)   01/07/20 117 kg (258 lb 12 8 oz)     Patient was admitted with increased lower extremity edema due to acute systolic CHF due to noncompliance with medications  Ejection fraction was 45-50% on this admission  Patient was treated with IV Bumex then IV diuretics were held because of worsening renal function then restarted on 3/26/2021 due to increasing lower extremity edema and oxygen requirement   Lungs are clear to auscultation but patient has worsening lower extremity edema  Patient was switched to Bumex drip on 03/29  Bumex drip was discontinued on 03/30 Daily weight and I&Os  Continue on dialysis per Nephrology  Patient underwent dialysis for 3 days [id number]    Patient underwent dialysis on Saturday 4/3      Labs & Imaging: I have personally reviewed pertinent reports  VTE Pharmacologic Prophylaxis: Heparin  VTE Mechanical Prophylaxis: sequential compression device    Code Status:   Level 1 - Full Code    Patient Centered Rounds: I have performed bedside rounds with nursing staff today  Discussions with Specialists or Other Care Team Provider:  Nephrology    Education and Discussions with Family / Patient:  Patient states she will update her daughter  I offered to call    Current Length of Stay: 20 day(s)    Current Patient Status: Inpatient   Certification Statement: The patient will continue to require additional inpatient hospital stay due to see my assessment and plan  Subjective:   Patient is seen and examined at bedside  Denies any new complaints  No overt or active rectal bleeding  Patient is on 4 L of supplemental oxygen  Denies any nausea, vomiting, pain  Afebrile  All other ROS are negative  Objective:    Vitals: Blood pressure 146/65, pulse 61, temperature 98 6 °F (37 °C), temperature source Oral, resp  rate 15, height 5' 4" (1 626 m), weight 126 kg (277 lb 9 6 oz), SpO2 97 %, not currently breastfeeding  ,Body mass index is 47 65 kg/m²  SPO2 RA Rest      ED to Hosp-Admission (Current) from 3/15/2021 in [address] Med Surg Unit   SpO2  97 %   SpO2 Activity  At Rest   O2 Device  None (Room air)   O2 Flow Rate  --        I&O:     Intake/Output Summary (Last 24 hours) at 4/4/2021 0803  Last data filed at 4/3/2021 1615  Gross per 24 hour   Intake 1090 ml   Output 2300 ml   Net -1210 ml       Physical Exam:    General- Alert, sitting comfortably in chair  Not in any acute distress  Neck- Supple, No JVD  CVS- regular, S1 and S2 normal  Chest- Bilateral Air entry, decreased at bases  Abdomen- soft, obese, nontender, not distended, no guarding or rigidity, BS+  Extremities-  Has pedal edema  Bilateral lower extremities-dressings in place  CNS-   Alert, awake and orientedx3  No focal deficits present      Invasive Devices     Peripheral Intravenous Line            Peripheral IV 04/01/21 Dorsal (posterior); Left Wrist 3 days          Hemodialysis Catheter            HD Permanent Double Catheter 4 days                      Social History  reviewed  History reviewed  No pertinent family history   reviewed    Meds:  Current Facility-Administered Medications   Medication Dose Route Frequency Provider Last Rate Last Admin    acetaminophen (TYLENOL) tablet 650 mg  650 mg Oral Q6H PRN Lynda Méndez PA-C   650 mg at 04/04/21 0331    aspirin (ECOTRIN LOW STRENGTH) EC tablet 81 mg  81 mg Oral Daily Kayode Tanner MD   81 mg at 04/03/21 0837    atorvastatin (LIPITOR) tablet 40 mg  40 mg Oral Daily Sarah Sanchez PA-C   40 mg at 04/03/21 0836    calcium acetate (PHOSLO) capsule 667 mg  667 mg Oral TID With Meals HOWARD Weaver   667 mg at 04/03/21 2042    carvedilol (COREG) tablet 12 5 mg  12 5 mg Oral BID Hetal Alvarez MD   12 5 mg at 04/03/21 0836    clopidogrel (PLAVIX) tablet 75 mg  75 mg Oral Daily Kayode Tanner MD   75 mg at 04/03/21 0837    dextrose 50 % IV solution 25 mL  25 mL Intravenous Once Bahman Lemons DO        diphenhydrAMINE (BENADRYL) tablet 25 mg  25 mg Oral Q6H PRN Lynda Méndez PA-C   25 mg at 04/01/21 0353    epoetin cedric (EPOGEN,PROCRIT) injection 10,000 Units  10,000 Units Intravenous After Dialysis Hetal Alvarez MD   10,000 Units at 04/01/21 1353    epoetin cedric (EPOGEN,PROCRIT) injection 10,000 Units  10,000 Units Intravenous After Dialysis Hetal Alvarez MD   10,000 Units at 04/03/21 1434    heparin (porcine) subcutaneous injection 5,000 Units  5,000 Units Subcutaneous [facility] Tunde Govea MD   5,000 Units at 04/04/21 0555    insulin glargine (LANTUS) subcutaneous injection 25 Units 0 25 mL  25 Units Subcutaneous QAM Tunde Govea MD   25 Units at 04/02/21 0847    insulin lispro (HumaLOG) 100 units/mL subcutaneous injection 1-6 Units  1-6 Units Subcutaneous TID AC Lynda Méndez PA-C   1 Units at 04/02/21 1734    insulin lispro (HumaLOG) 100 units/mL subcutaneous injection 1-6 Units  1-6 Units Subcutaneous HS Sual Leonardo PA-C   2 Units at 04/02/21 2235    insulin lispro (HumaLOG) 100 units/mL subcutaneous injection 3 Units  3 Units Subcutaneous TID With Meals Sahra aHtfield MD   3 Units at 04/02/21 1734    midodrine (PROAMATINE) tablet 10 mg  10 mg Oral Before Dialysis Parvin Herrmann MD   10 mg at 04/03/21 1301    ondansetron (ZOFRAN) injection 4 mg  4 mg Intravenous Q6H PRN Mariajose Contreras MD   4 mg at 03/25/21 1221    oxyCODONE (ROXICODONE) IR tablet 5 mg  5 mg Oral Q6H PRN Mariajose Contreras MD   5 mg at 04/03/21 1423      Medications Prior to Admission   Medication    aspirin (ECOTRIN LOW STRENGTH) 81 mg EC tablet    atorvastatin (LIPITOR) 40 mg tablet    carvedilol (COREG) 25 mg tablet    clopidogrel (PLAVIX) 75 mg tablet    hydrALAZINE (APRESOLINE) 50 mg tablet    torsemide (DEMADEX) 20 mg tablet    amLODIPine (NORVASC) 2 5 mg tablet    insulin glargine (Toujeo SoloStar) 300 units/mL CONCETRATED U-300 injection pen (1-unit dial)    nitroglycerin (NITROSTAT) 0 4 mg SL tablet       Labs:  Results from last 7 days   Lab Units 04/03/21  0613 04/02/21  0537 04/01/21  0626   WBC Thousand/uL 9 20 9 66 7 99   HEMOGLOBIN g/dL 6 4* 6 8* 6 6*   HEMATOCRIT % 22 3* 23 3* 23 2*   PLATELETS Thousands/uL 97* 87* 105*   NEUTROS PCT % 72 71 70   LYMPHS PCT % 9* 9* 11*   MONOS PCT % 7 8 7   EOS PCT % 12* 11* 11*     Results from last 7 days   Lab Units 04/04/21  0553 04/03/21  0613 04/02/21  0537   POTASSIUM mmol/L 3 8 4 6 4 6   CHLORIDE mmol/L 103 107 105   CO2 mmol/L 27 24 23   BUN mg/dL 42* 70* 59*   CREATININE mg/dL 3 65* 4 83* 4 31*   CALCIUM mg/dL 8 4 8 4 8 1*     Lab Results   Component Value Date    TROPONINI <0 02 03/15/2021    TROPONINI 0 02 01/07/2020    TROPONINI <0 02 05/11/2017    CKTOTAL 53 04/07/2014         Lab Results   Component Value Date    BLOODCX No Growth After 5 Days   03/15/2021    BLOODCX No Growth After 5 Days  03/15/2021    URINECX <10,000 cfu/ml  03/16/2021         Imaging:  Results for orders placed during the hospital encounter of 03/15/21   XR chest portable    Narrative CHEST     INDICATION:   sob  COMPARISON:  3/15/2021    EXAM PERFORMED/VIEWS:  XR CHEST PORTABLE  Single view    FINDINGS:  Increased vascular congestion  Persistent cardiomegaly  No localized pulmonary mass or infiltrate  Right IJ dialysis catheter has been placed, terminating at cavoatrial junction  No pneumothorax or pleural effusion  Osseous structures appear within normal limits for patient age  Impression Increased vascular congestion    Typical right IJ dialysis catheter          Workstation performed: [id number]       Results for orders placed during the hospital encounter of 01/07/20   XR chest 2 views    Narrative CHEST     INDICATION:   Chest Pain  COMPARISON:  5/11/2017    EXAM PERFORMED/VIEWS:  XR CHEST PA & LATERAL      FINDINGS:    Cardiomediastinal silhouette appears unremarkable  Crowding of the pulmonary markings secondary to shallow inspiration  Grossly clear lungs  No pneumothorax or pleural effusion  Osseous structures appear within normal limits for patient age  Impression No acute cardiopulmonary disease          Workstation performed: [id number]         Last 24 Hours Medication List:   Current Facility-Administered Medications   Medication Dose Route Frequency Provider Last Rate    acetaminophen  650 mg Oral Q6H PRN Sarah Harper PA-C      aspirin  81 mg Oral Daily Arnol Herzog MD      atorvastatin  40 mg Oral Daily Sarah Harper PA-C      calcium acetate  667 mg Oral TID With Meals HOWARD Langston      carvedilol  12 5 mg Oral BID Amaris Espinoza MD      clopidogrel  75 mg Oral Daily Arnol Herzog MD      dextrose  25 mL Intravenous Once Rubia Taylor DO      diphenhydrAMINE  25 mg Oral Q6H PRN Sarah Harper PA-C      epoetin cedric  10,000 Units Intravenous After Dialysis Batsheva Brandt MD      epoetin cedric  10,000 Units Intravenous After Dialysis Batsheva Brandt MD      heparin (porcine)  5,000 Units Subcutaneous [facility] Maureen Lemus MD      insulin glargine  25 Units Subcutaneous QAM Maureen Lemus MD      insulin lispro  1-6 Units Subcutaneous TID AC Sarah Sanchez PA-C      insulin lispro  1-6 Units Subcutaneous HS Sarah Sanchez PA-C      insulin lispro  3 Units Subcutaneous TID With Meals Maureen Lemus MD      midodrine  10 mg Oral Before Dialysis Batsheva Brandt MD      ondansetron  4 mg Intravenous Q6H PRN Aguilar Hauser MD      oxyCODONE  5 mg Oral Q6H PRN Aguilar Hauser MD          Today, Patient Was Seen By: Aguilar Hauser MD    ** Please Note: Dictation voice to text software may have been used in the creation of this document   **

## 2022-12-03 ENCOUNTER — HOSPITAL ENCOUNTER (INPATIENT)
Age: 67
LOS: 3 days | Discharge: HOME | DRG: 175 | End: 2022-12-06
Attending: EMERGENCY MEDICINE | Admitting: INTERNAL MEDICINE
Payer: MEDICARE

## 2022-12-03 ENCOUNTER — APPOINTMENT (OUTPATIENT)
Dept: ULTRASOUND IMAGING | Age: 67
DRG: 175 | End: 2022-12-03
Payer: MEDICARE

## 2022-12-03 ENCOUNTER — APPOINTMENT (OUTPATIENT)
Dept: CT IMAGING | Age: 67
DRG: 175 | End: 2022-12-03
Payer: MEDICARE

## 2022-12-03 DIAGNOSIS — I26.94 MULTIPLE SUBSEGMENTAL PULMONARY EMBOLI WITHOUT ACUTE COR PULMONALE (HCC): Primary | ICD-10-CM

## 2022-12-03 DIAGNOSIS — Z99.2 ESRD NEEDING DIALYSIS (HCC): ICD-10-CM

## 2022-12-03 DIAGNOSIS — N18.6 ESRD NEEDING DIALYSIS (HCC): ICD-10-CM

## 2022-12-03 PROBLEM — I26.99 ACUTE PULMONARY EMBOLISM (HCC): Status: ACTIVE | Noted: 2022-12-03

## 2022-12-03 PROBLEM — I26.99 PULMONARY EMBOLISM AND INFARCTION (HCC): Status: ACTIVE | Noted: 2022-12-03

## 2022-12-03 PROBLEM — Z95.5 H/O HEART ARTERY STENT: Status: ACTIVE | Noted: 2022-12-03

## 2022-12-03 LAB
ALBUMIN SERPL-MCNC: 3.7 G/DL (ref 3.5–5.2)
ALP BLD-CCNC: 89 U/L (ref 35–104)
ALT SERPL-CCNC: 8 U/L (ref 0–32)
ANION GAP SERPL CALCULATED.3IONS-SCNC: 17 MMOL/L (ref 7–16)
APTT: 34.4 SEC (ref 24.5–35.1)
AST SERPL-CCNC: 8 U/L (ref 0–31)
BILIRUB SERPL-MCNC: 0.4 MG/DL (ref 0–1.2)
BILIRUBIN DIRECT: <0.2 MG/DL (ref 0–0.3)
BILIRUBIN, INDIRECT: NORMAL MG/DL (ref 0–1)
BUN BLDV-MCNC: 40 MG/DL (ref 6–23)
C-REACTIVE PROTEIN: 23.8 MG/DL (ref 0–0.4)
CA 125: 19.8 U/ML (ref 0–35)
CALCIUM SERPL-MCNC: 8.3 MG/DL (ref 8.6–10.2)
CEA: 3 NG/ML (ref 0–5.2)
CHLORIDE BLD-SCNC: 90 MMOL/L (ref 98–107)
CO2: 28 MMOL/L (ref 22–29)
CREAT SERPL-MCNC: 6 MG/DL (ref 0.5–1)
D DIMER: 1765 NG/ML DDU
EKG ATRIAL RATE: 92 BPM
EKG P AXIS: 29 DEGREES
EKG P-R INTERVAL: 168 MS
EKG Q-T INTERVAL: 408 MS
EKG QRS DURATION: 92 MS
EKG QTC CALCULATION (BAZETT): 504 MS
EKG R AXIS: 0 DEGREES
EKG T AXIS: 81 DEGREES
EKG VENTRICULAR RATE: 92 BPM
GFR SERPL CREATININE-BSD FRML MDRD: 7 ML/MIN/1.73
GLUCOSE BLD-MCNC: 370 MG/DL (ref 74–99)
HBA1C MFR BLD: 12.7 % (ref 4–5.6)
HCT VFR BLD CALC: 31.7 % (ref 34–48)
HEMOGLOBIN: 10.1 G/DL (ref 11.5–15.5)
INFLUENZA A BY PCR: NOT DETECTED
INFLUENZA B BY PCR: NOT DETECTED
LACTIC ACID: 1.6 MMOL/L (ref 0.5–2.2)
MAGNESIUM: 2.1 MG/DL (ref 1.6–2.6)
MCH RBC QN AUTO: 32.2 PG (ref 26–35)
MCHC RBC AUTO-ENTMCNC: 31.9 % (ref 32–34.5)
MCV RBC AUTO: 101 FL (ref 80–99.9)
METER GLUCOSE: 163 MG/DL (ref 74–99)
METER GLUCOSE: 262 MG/DL (ref 74–99)
METER GLUCOSE: 349 MG/DL (ref 74–99)
PDW BLD-RTO: 13.7 FL (ref 11.5–15)
PHOSPHORUS: 6.7 MG/DL (ref 2.5–4.5)
PLATELET # BLD: 169 E9/L (ref 130–450)
PMV BLD AUTO: 10.7 FL (ref 7–12)
POTASSIUM SERPL-SCNC: 3.9 MMOL/L (ref 3.5–5)
PRO-BNP: ABNORMAL PG/ML (ref 0–125)
PROCALCITONIN: 0.77 NG/ML (ref 0–0.08)
RBC # BLD: 3.14 E12/L (ref 3.5–5.5)
SARS-COV-2, NAAT: NOT DETECTED
SODIUM BLD-SCNC: 135 MMOL/L (ref 132–146)
T4 FREE: 1.27 NG/DL (ref 0.93–1.7)
TOTAL PROTEIN: 7 G/DL (ref 6.4–8.3)
TROPONIN, HIGH SENSITIVITY: 73 NG/L (ref 0–9)
TROPONIN, HIGH SENSITIVITY: 76 NG/L (ref 0–9)
TSH SERPL DL<=0.05 MIU/L-ACNC: 1.47 UIU/ML (ref 0.27–4.2)
WBC # BLD: 11.4 E9/L (ref 4.5–11.5)

## 2022-12-03 PROCEDURE — 82378 CARCINOEMBRYONIC ANTIGEN: CPT

## 2022-12-03 PROCEDURE — 5A1D70Z PERFORMANCE OF URINARY FILTRATION, INTERMITTENT, LESS THAN 6 HOURS PER DAY: ICD-10-PCS | Performed by: INTERNAL MEDICINE

## 2022-12-03 PROCEDURE — 86140 C-REACTIVE PROTEIN: CPT

## 2022-12-03 PROCEDURE — 84145 PROCALCITONIN (PCT): CPT

## 2022-12-03 PROCEDURE — 83880 ASSAY OF NATRIURETIC PEPTIDE: CPT

## 2022-12-03 PROCEDURE — 87040 BLOOD CULTURE FOR BACTERIA: CPT

## 2022-12-03 PROCEDURE — 84100 ASSAY OF PHOSPHORUS: CPT

## 2022-12-03 PROCEDURE — 83036 HEMOGLOBIN GLYCOSYLATED A1C: CPT

## 2022-12-03 PROCEDURE — 83605 ASSAY OF LACTIC ACID: CPT

## 2022-12-03 PROCEDURE — 99285 EMERGENCY DEPT VISIT HI MDM: CPT

## 2022-12-03 PROCEDURE — 71275 CT ANGIOGRAPHY CHEST: CPT

## 2022-12-03 PROCEDURE — 87502 INFLUENZA DNA AMP PROBE: CPT

## 2022-12-03 PROCEDURE — 93005 ELECTROCARDIOGRAM TRACING: CPT | Performed by: EMERGENCY MEDICINE

## 2022-12-03 PROCEDURE — 0202U NFCT DS 22 TRGT SARS-COV-2: CPT

## 2022-12-03 PROCEDURE — 96374 THER/PROPH/DIAG INJ IV PUSH: CPT

## 2022-12-03 PROCEDURE — 6360000002 HC RX W HCPCS: Performed by: INTERNAL MEDICINE

## 2022-12-03 PROCEDURE — 80048 BASIC METABOLIC PNL TOTAL CA: CPT

## 2022-12-03 PROCEDURE — 84443 ASSAY THYROID STIM HORMONE: CPT

## 2022-12-03 PROCEDURE — 93970 EXTREMITY STUDY: CPT

## 2022-12-03 PROCEDURE — 90935 HEMODIALYSIS ONE EVALUATION: CPT

## 2022-12-03 PROCEDURE — 93010 ELECTROCARDIOGRAM REPORT: CPT | Performed by: INTERNAL MEDICINE

## 2022-12-03 PROCEDURE — 36415 COLL VENOUS BLD VENIPUNCTURE: CPT

## 2022-12-03 PROCEDURE — 6360000002 HC RX W HCPCS: Performed by: EMERGENCY MEDICINE

## 2022-12-03 PROCEDURE — 2500000003 HC RX 250 WO HCPCS: Performed by: INTERNAL MEDICINE

## 2022-12-03 PROCEDURE — 85730 THROMBOPLASTIN TIME PARTIAL: CPT

## 2022-12-03 PROCEDURE — 85027 COMPLETE CBC AUTOMATED: CPT

## 2022-12-03 PROCEDURE — 86304 IMMUNOASSAY TUMOR CA 125: CPT

## 2022-12-03 PROCEDURE — 6370000000 HC RX 637 (ALT 250 FOR IP): Performed by: INTERNAL MEDICINE

## 2022-12-03 PROCEDURE — 87635 SARS-COV-2 COVID-19 AMP PRB: CPT

## 2022-12-03 PROCEDURE — 80076 HEPATIC FUNCTION PANEL: CPT

## 2022-12-03 PROCEDURE — 84439 ASSAY OF FREE THYROXINE: CPT

## 2022-12-03 PROCEDURE — 2060000000 HC ICU INTERMEDIATE R&B

## 2022-12-03 PROCEDURE — 6360000004 HC RX CONTRAST MEDICATION: Performed by: RADIOLOGY

## 2022-12-03 PROCEDURE — 84484 ASSAY OF TROPONIN QUANT: CPT

## 2022-12-03 PROCEDURE — 85378 FIBRIN DEGRADE SEMIQUANT: CPT

## 2022-12-03 PROCEDURE — 2700000000 HC OXYGEN THERAPY PER DAY

## 2022-12-03 PROCEDURE — 82962 GLUCOSE BLOOD TEST: CPT

## 2022-12-03 PROCEDURE — 96372 THER/PROPH/DIAG INJ SC/IM: CPT

## 2022-12-03 PROCEDURE — 83735 ASSAY OF MAGNESIUM: CPT

## 2022-12-03 PROCEDURE — 82105 ALPHA-FETOPROTEIN SERUM: CPT

## 2022-12-03 PROCEDURE — 74177 CT ABD & PELVIS W/CONTRAST: CPT

## 2022-12-03 RX ORDER — ORPHENADRINE CITRATE 30 MG/ML
60 INJECTION INTRAMUSCULAR; INTRAVENOUS ONCE
Status: COMPLETED | OUTPATIENT
Start: 2022-12-03 | End: 2022-12-03

## 2022-12-03 RX ORDER — INSULIN LISPRO 100 [IU]/ML
0-4 INJECTION, SOLUTION INTRAVENOUS; SUBCUTANEOUS
Status: DISCONTINUED | OUTPATIENT
Start: 2022-12-03 | End: 2022-12-05

## 2022-12-03 RX ORDER — ACETAMINOPHEN 325 MG/1
650 TABLET ORAL EVERY 8 HOURS PRN
Status: DISCONTINUED | OUTPATIENT
Start: 2022-12-03 | End: 2022-12-06 | Stop reason: HOSPADM

## 2022-12-03 RX ORDER — DEXTROSE MONOHYDRATE 100 MG/ML
INJECTION, SOLUTION INTRAVENOUS CONTINUOUS PRN
Status: DISCONTINUED | OUTPATIENT
Start: 2022-12-03 | End: 2022-12-06 | Stop reason: HOSPADM

## 2022-12-03 RX ORDER — MIDODRINE HYDROCHLORIDE 5 MG/1
10 TABLET ORAL DAILY PRN
Status: DISCONTINUED | OUTPATIENT
Start: 2022-12-03 | End: 2022-12-06 | Stop reason: HOSPADM

## 2022-12-03 RX ORDER — ENOXAPARIN SODIUM 100 MG/ML
40 INJECTION SUBCUTANEOUS DAILY
Status: DISCONTINUED | OUTPATIENT
Start: 2022-12-03 | End: 2022-12-03 | Stop reason: ALTCHOICE

## 2022-12-03 RX ORDER — FENTANYL CITRATE 50 UG/ML
75 INJECTION, SOLUTION INTRAMUSCULAR; INTRAVENOUS ONCE
Status: COMPLETED | OUTPATIENT
Start: 2022-12-03 | End: 2022-12-03

## 2022-12-03 RX ORDER — HEPARIN SODIUM 10000 [USP'U]/ML
5000 INJECTION, SOLUTION INTRAVENOUS; SUBCUTANEOUS EVERY 8 HOURS
Status: DISCONTINUED | OUTPATIENT
Start: 2022-12-03 | End: 2022-12-03

## 2022-12-03 RX ORDER — ENOXAPARIN SODIUM 150 MG/ML
1 INJECTION SUBCUTANEOUS 2 TIMES DAILY
Status: DISCONTINUED | OUTPATIENT
Start: 2022-12-03 | End: 2022-12-03

## 2022-12-03 RX ORDER — PANTOPRAZOLE SODIUM 40 MG/1
40 TABLET, DELAYED RELEASE ORAL
Status: DISCONTINUED | OUTPATIENT
Start: 2022-12-04 | End: 2022-12-06 | Stop reason: HOSPADM

## 2022-12-03 RX ORDER — HEPARIN SODIUM 1000 [USP'U]/ML
80 INJECTION, SOLUTION INTRAVENOUS; SUBCUTANEOUS ONCE
Status: COMPLETED | OUTPATIENT
Start: 2022-12-03 | End: 2022-12-03

## 2022-12-03 RX ORDER — INSULIN LISPRO 100 [IU]/ML
0-4 INJECTION, SOLUTION INTRAVENOUS; SUBCUTANEOUS NIGHTLY
Status: DISCONTINUED | OUTPATIENT
Start: 2022-12-03 | End: 2022-12-06 | Stop reason: HOSPADM

## 2022-12-03 RX ORDER — HEPARIN SODIUM 1000 [USP'U]/ML
80 INJECTION, SOLUTION INTRAVENOUS; SUBCUTANEOUS PRN
Status: DISCONTINUED | OUTPATIENT
Start: 2022-12-03 | End: 2022-12-04 | Stop reason: ALTCHOICE

## 2022-12-03 RX ORDER — ATORVASTATIN CALCIUM 40 MG/1
40 TABLET, FILM COATED ORAL DAILY
Status: DISCONTINUED | OUTPATIENT
Start: 2022-12-03 | End: 2022-12-03

## 2022-12-03 RX ORDER — HEPARIN SODIUM 10000 [USP'U]/100ML
5-30 INJECTION, SOLUTION INTRAVENOUS CONTINUOUS
Status: DISCONTINUED | OUTPATIENT
Start: 2022-12-03 | End: 2022-12-04 | Stop reason: ALTCHOICE

## 2022-12-03 RX ORDER — SODIUM CHLORIDE 0.9 % (FLUSH) 0.9 %
10 SYRINGE (ML) INJECTION PRN
Status: DISCONTINUED | OUTPATIENT
Start: 2022-12-03 | End: 2022-12-06 | Stop reason: HOSPADM

## 2022-12-03 RX ORDER — HEPARIN SODIUM 1000 [USP'U]/ML
40 INJECTION, SOLUTION INTRAVENOUS; SUBCUTANEOUS PRN
Status: DISCONTINUED | OUTPATIENT
Start: 2022-12-03 | End: 2022-12-04 | Stop reason: ALTCHOICE

## 2022-12-03 RX ORDER — FENTANYL CITRATE 50 UG/ML
50 INJECTION, SOLUTION INTRAMUSCULAR; INTRAVENOUS EVERY 6 HOURS PRN
Status: DISCONTINUED | OUTPATIENT
Start: 2022-12-03 | End: 2022-12-06 | Stop reason: HOSPADM

## 2022-12-03 RX ORDER — CALCIUM ACETATE 667 MG/1
667 CAPSULE ORAL
Status: DISCONTINUED | OUTPATIENT
Start: 2022-12-03 | End: 2022-12-06 | Stop reason: HOSPADM

## 2022-12-03 RX ORDER — ENOXAPARIN SODIUM 150 MG/ML
1 INJECTION SUBCUTANEOUS ONCE
Status: COMPLETED | OUTPATIENT
Start: 2022-12-03 | End: 2022-12-03

## 2022-12-03 RX ORDER — ASPIRIN 81 MG/1
81 TABLET ORAL DAILY
Status: DISCONTINUED | OUTPATIENT
Start: 2022-12-03 | End: 2022-12-06 | Stop reason: HOSPADM

## 2022-12-03 RX ADMIN — FENTANYL CITRATE 75 MCG: 50 INJECTION INTRAMUSCULAR; INTRAVENOUS at 06:46

## 2022-12-03 RX ADMIN — ORPHENADRINE CITRATE 60 MG: 30 INJECTION INTRAMUSCULAR; INTRAVENOUS at 06:47

## 2022-12-03 RX ADMIN — IOPAMIDOL 75 ML: 755 INJECTION, SOLUTION INTRAVENOUS at 07:52

## 2022-12-03 RX ADMIN — HEPARIN SODIUM 17 UNITS/KG/HR: 10000 INJECTION, SOLUTION INTRAVENOUS at 18:38

## 2022-12-03 RX ADMIN — HEPARIN SODIUM 9350 UNITS: 1000 INJECTION INTRAVENOUS; SUBCUTANEOUS at 18:36

## 2022-12-03 RX ADMIN — ACETAMINOPHEN 650 MG: 325 TABLET ORAL at 18:16

## 2022-12-03 RX ADMIN — ASPIRIN 81 MG: 81 TABLET, COATED ORAL at 17:35

## 2022-12-03 RX ADMIN — CALCIUM ACETATE 667 MG: 667 CAPSULE ORAL at 17:35

## 2022-12-03 RX ADMIN — HEPARIN SODIUM 9350 UNITS: 1000 INJECTION INTRAVENOUS; SUBCUTANEOUS at 21:13

## 2022-12-03 RX ADMIN — INSULIN LISPRO 3 UNITS: 100 INJECTION, SOLUTION INTRAVENOUS; SUBCUTANEOUS at 11:48

## 2022-12-03 RX ADMIN — ENOXAPARIN SODIUM 120 MG: 150 INJECTION SUBCUTANEOUS at 09:01

## 2022-12-03 ASSESSMENT — ENCOUNTER SYMPTOMS
SINUS PRESSURE: 0
EYE DISCHARGE: 0
BACK PAIN: 0
DIARRHEA: 0
SHORTNESS OF BREATH: 0
WHEEZING: 0
EYE REDNESS: 0
ABDOMINAL DISTENTION: 0
SORE THROAT: 0
COUGH: 0
EYE PAIN: 0
VOMITING: 0
NAUSEA: 0

## 2022-12-03 ASSESSMENT — PAIN DESCRIPTION - LOCATION
LOCATION: CHEST
LOCATION: CHEST

## 2022-12-03 ASSESSMENT — PAIN SCALES - GENERAL
PAINLEVEL_OUTOF10: 6
PAINLEVEL_OUTOF10: 0
PAINLEVEL_OUTOF10: 5

## 2022-12-03 ASSESSMENT — PAIN DESCRIPTION - DESCRIPTORS: DESCRIPTORS: ACHING

## 2022-12-03 ASSESSMENT — PAIN DESCRIPTION - ORIENTATION: ORIENTATION: MID

## 2022-12-03 NOTE — PROGRESS NOTES
Pharmacy called regarding lovenox being contraindicated in ESRD patients. Put a call out to Dr. Bobbie Lundborg. He deferred to Dr. Cynthia Varghese. Awaiting callback.

## 2022-12-03 NOTE — H&P
History and Physical      CHIEF COMPLAINT: Flank pain Beeghly to dialysis, shortness of breath    History of Present Illness: 43-year-old female patient of Dr. Fady Del Valle am yesterday did not follow. History obtained from patient as well as extensive review of electronic record. Patient is on chronic hemodialysis Tuesdays Thursdays Saturdays at [address]. On her way to dialysis she began experiencing chest pain in the middle of her back and progressed. There was no initial dyspnea but she became dyspneic after arrival.  She states she has not been sedentary has been up and on to go. No recent travel history. In the ED temperature was 97.9. Blood pressure 174/95. Pulse 52 respirations 16 SPO2 99 on room air. She underwent CTA of the chest with following results--      FINDINGS:   Pulmonary Arteries: The main pulmonary arteries are well pacified with IV   contrast.     There is a filling defect seen within the segmental and subsegmental   pulmonary arteries of the right medial basal segment and posterior basal   segment. There is no pulmonary embolus seen within the pulmonary arteries of   the right middle lobe or right upper lobe. No left pulmonary embolus is noted. Mediastinum: No evidence of mediastinal lymphadenopathy. The heart is   enlarged. There is no pericardial effusion. There is calcified plaque seen   within the coronary arteries. There are no findings of right ventricular   strain. Lungs/pleura: There is a small mild pleural fluid seen within the right major   fissure     Patchy airspace disease seen within the right lung base which could represent   pneumonia or a small pulmonary arm infarct. There is minimal atelectasis seen within the left lung base. Upper Abdomen: Limited images of the upper abdomen are unremarkable. Soft Tissues/Bones:  Age related degenerative changes of the visualized   osseous structures without focal destructive lesion. Impression:       1. Pulmonary embolus seen within the segmental and subsegmental pulmonary   arteries of the medial basal segment and posterior basal segment of the right   lower lobe. 2. There are no findings of right ventricular strain   3. There is no pulmonary embolus seen within the pulmonary arteries of the   left lung   4. Patchy airspace disease within the right lower lobe which could represent   pneumonia or possibly an early pulmonary infarct   5. Linear atelectasis seen within the left lung base   6. Small amount of pleural fluid seen within the right major fissure. She is underwent CT of the abdomen with following results--     Impression:       Trace to small right pleural effusion and adjacent atelectasis at the right   lung base     No acute findings of the abdomen or pelvis. Fat containing infraumbilical   hernia measuring 2.4 cm neck of hernia without associated bowel. -- In the ED hemoglobin 10.1 WBC 11.4. D-dimer elevated 1765. proBNP 31,643. Troponins were slightly elevated 7376. BUN 40 creatinine 6.0.  Glucose 370.  -- Patient developed diabetes approximately age 49. She had been on metformin never on insulin.   She states recently she is taking nothing, watching her diet  --Smoking approximately  quit  1 pack daily 15 years  --Mother  age 62 acute MI, father  age 62 CVA  --Coronary artery disease with PTCA PCI done while living in [place], [place]  --Hemodialysis she states 2021; [facility] states 2021  --Denies any rheumatic fever scarlet fever polio diphtheria cancer        Past Medical History:   Diagnosis Date    Acute pulmonary embolism ([facility] 12/3/2022    Anemia in CKD (chronic kidney disease) 2021    Anxiety and depression 2022    At high risk for falls 2022    Brain aneurysm     CLABSI (central line-associated bloodstream infection) 2021    Cough 2022    Diabetes mellitus ([address]     Diabetes mellitus type 2 with complications [facility]     Dizziness on standing 2022    ESRD (end stage renal disease) ([facility] 2021    ESRD on hemodialysis ([facility] 2021    Essential hypertension 2021    Fever, unspecified     Gastrointestinal hemorrhage 2021    H/O heart artery stent     History of Clostridioides difficile colitis 2022    Hyperlipidemia     Hypertension     MSSA bacteremia 2021    Nausea & vomiting 2021    Obesity, Class III, BMI 40-49.9 (morbid obesity) ([facility]     Periumbilical abdominal pain          Past Surgical History:   Procedure Laterality Date    CARDIAC SURGERY       SECTION      DIALYSIS FISTULA CREATION Left 2022    REVISION AV FISTULA LEFT ARM performed by Katheryne Prader, MD at [address] [place] [address] N/A 2021    EGD BIOPSY performed by Bang Bear MD at [address] [place] [address] N/A 2021    CATHETER INSERTION  TUNNELED HEMODIALYSIS, WITH REMOVAL OF TEMPORARY CATHETER [facility]       Medications Prior to Admission:    Medications Prior to Admission: aspirin 81 MG EC tablet, Take 81 mg by mouth daily  acetaminophen (TYLENOL) 650 MG extended release tablet, Take 650 mg by mouth every 8 hours as needed for Pain  midodrine (PROAMATINE) 10 MG tablet, Take 10 mg by mouth daily as needed (AT DIALYSIS FOR LOW BP)  calcium acetate (PHOSLO) 667 MG CAPS capsule, Take 667-2,001 mg by mouth 3 times daily (with meals)    Allergies:    Benzonatate, Morphine, Pcn [penicillins], and Sodium hypochlorite    Social History:    reports that she quit smoking about 5 years ago. Her smoking use included cigarettes. She has a 10.00 pack-year smoking history. She has never used smokeless tobacco. She reports that she does not drink alcohol and does not use drugs. Family History:   family history includes Coronary Art Dis in her brother, father, and mother.     REVIEW OF SYSTEMS:  As above in the HPI, otherwise negative    PHYSICAL EXAM:    VS: /76   Pulse 86   Temp 98.6 °F (37 °C)   Resp 18   Ht 5' 4\" (1.626 m)   Wt 259 lb 14.8 oz (117.9 kg)   SpO2 98%   BMI 44.62 kg/m²     General appearance: Alert, Awake, Oriented times 3, no distress; morbidly obese  Skin: Warm and dry ; no rashes  Head: Normocephalic. No masses, lesions or tenderness noted  Eyes: Conjunctivae pink, sclera white. PERRL,EOM-I  Ears: External ears normal  Nose/Sinuses: Nares normal. Septum midline. Mucosa normal. No drainage  Oropharynx: Oropharynx clear with no exudate seen  Neck: Supple. No jugular venous distension, lymphadenopathy or thyromegaly Trachea midline  Lungs: Rare rhonchi  Heart: S1 S2  Regular rate and rhythm. No rub, gallop, murmur  Abdomen: Soft, non-tender. BS normal. No masses, organomegaly; no rebound or guarding  Extremities: Chronic 2-3+ bilateral edema since starting dialysis  Musculoskeletal: Muscular strength appears intact. Neuro:  No focal motor defects ; II-XII grossly intact .  MORRIS equally  Breast: deferred  Rectal: deferred  Genitalia:  deferred    LABS:  CBC:   Lab Results   Component Value Date/Time    WBC 11.4 12/03/2022 06:32 AM    RBC 3.14 12/03/2022 06:32 AM    HGB 10.1 12/03/2022 06:32 AM    HCT 31.7 12/03/2022 06:32 AM    .0 12/03/2022 06:32 AM    MCH 32.2 12/03/2022 06:32 AM    MCHC 31.9 12/03/2022 06:32 AM    RDW 13.7 12/03/2022 06:32 AM     12/03/2022 06:32 AM    MPV 10.7 12/03/2022 06:32 AM     CBC with Differential:    Lab Results   Component Value Date/Time    WBC 11.4 12/03/2022 06:32 AM    RBC 3.14 12/03/2022 06:32 AM    HGB 10.1 12/03/2022 06:32 AM    HCT 31.7 12/03/2022 06:32 AM     12/03/2022 06:32 AM    .0 12/03/2022 06:32 AM    MCH 32.2 12/03/2022 06:32 AM    MCHC 31.9 12/03/2022 06:32 AM    RDW 13.7 12/03/2022 06:32 AM    NRBC 0.0 01/13/2022 04:38 AM    LYMPHOPCT 13.4 09/22/2022 12:14 PM    MONOPCT 5.8 09/22/2022 12:14 PM BASOPCT 0.4 09/22/2022 12:14 PM    MONOSABS 0.47 09/22/2022 12:14 PM    LYMPHSABS 1.08 09/22/2022 12:14 PM    EOSABS 0.50 09/22/2022 12:14 PM    BASOSABS 0.03 09/22/2022 12:14 PM     Hemoglobin/Hematocrit:    Lab Results   Component Value Date/Time    HGB 10.1 12/03/2022 06:32 AM    HCT 31.7 12/03/2022 06:32 AM     CMP:    Lab Results   Component Value Date/Time     12/03/2022 06:32 AM    K 3.9 12/03/2022 06:32 AM    K 4.2 08/23/2022 12:12 PM    CL 90 12/03/2022 06:32 AM    CO2 28 12/03/2022 06:32 AM    BUN 40 12/03/2022 06:32 AM    CREATININE 6.0 12/03/2022 06:32 AM    GFRAA 8 09/22/2022 12:14 PM    LABGLOM 7 12/03/2022 06:32 AM    GLUCOSE 370 12/03/2022 06:32 AM    PROT 7.0 12/03/2022 06:32 AM    LABALBU 3.7 12/03/2022 06:32 AM    CALCIUM 8.3 12/03/2022 06:32 AM    BILITOT 0.4 12/03/2022 06:32 AM    ALKPHOS 89 12/03/2022 06:32 AM    AST 8 12/03/2022 06:32 AM    ALT 8 12/03/2022 06:32 AM     BMP:    Lab Results   Component Value Date/Time     12/03/2022 06:32 AM    K 3.9 12/03/2022 06:32 AM    K 4.2 08/23/2022 12:12 PM    CL 90 12/03/2022 06:32 AM    CO2 28 12/03/2022 06:32 AM    BUN 40 12/03/2022 06:32 AM    LABALBU 3.7 12/03/2022 06:32 AM    CREATININE 6.0 12/03/2022 06:32 AM    CALCIUM 8.3 12/03/2022 06:32 AM    GFRAA 8 09/22/2022 12:14 PM    LABGLOM 7 12/03/2022 06:32 AM    GLUCOSE 370 12/03/2022 06:32 AM     Hepatic Function Panel:    Lab Results   Component Value Date/Time    ALKPHOS 89 12/03/2022 06:32 AM    ALT 8 12/03/2022 06:32 AM    AST 8 12/03/2022 06:32 AM    PROT 7.0 12/03/2022 06:32 AM    BILITOT 0.4 12/03/2022 06:32 AM    BILIDIR <0.2 12/03/2022 06:32 AM    IBILI see below 12/03/2022 06:32 AM    LABALBU 3.7 12/03/2022 06:32 AM     Ionized Calcium:  No results found for: IONCA  Magnesium:    Lab Results   Component Value Date/Time    MG 2.1 12/03/2022 06:32 AM     Phosphorus:    Lab Results   Component Value Date/Time    PHOS 6.7 12/03/2022 12:09 PM     LDH:    Lab Results   Component Value Date/Time     01/11/2022 12:45 PM     Uric Acid:  No results found for: LABURIC, URICACID  PT/INR:    Lab Results   Component Value Date/Time    PROTIME 11.0 08/23/2022 12:12 PM    INR 1.0 08/23/2022 12:12 PM     Warfarin PT/INR:  No components found for: PTPATWAR, PTINRWAR  PTT:    Lab Results   Component Value Date/Time    APTT 29.0 08/23/2022 12:12 PM   [APTT}  Troponin:  No results found for: TROPONINI  Last 3 Troponin:  No results found for: TROPONINI  U/A:    Lab Results   Component Value Date/Time    COLORU Yellow 11/18/2021 03:19 AM    PROTEINU >=300 11/18/2021 03:19 AM    PHUR 6.0 11/18/2021 03:19 AM    WBCUA 1-3 11/18/2021 03:19 AM    RBCUA 0-1 11/18/2021 03:19 AM    BACTERIA FEW 11/18/2021 03:19 AM    CLARITYU Clear 11/18/2021 03:19 AM    SPECGRAV 1.020 11/18/2021 03:19 AM    LEUKOCYTESUR Negative 11/18/2021 03:19 AM    UROBILINOGEN 0.2 11/18/2021 03:19 AM    BILIRUBINUR Negative 11/18/2021 03:19 AM    BLOODU SMALL 11/18/2021 03:19 AM    GLUCOSEU 500 11/18/2021 03:19 AM     ABG:  No results found for: PH, PCO2, PO2, HCO3, BE, THGB, TCO2, O2SAT  HgBA1c:    Lab Results   Component Value Date/Time    LABA1C 8.9 03/02/2022 01:33 PM     FLP:  No results found for: TRIG, HDL, LDLCALC, LDLDIRECT, LABVLDL  TSH:    Lab Results   Component Value Date/Time    TSH 1.470 12/03/2022 12:09 PM     VITAMIN B12: No components found for: B12  FOLATE:  No results found for: FOLATE  IRON:    Lab Results   Component Value Date/Time    IRON 59 12/01/2021 02:28 AM     Iron Saturation:  No components found for: PERCENTFE  TIBC:    Lab Results   Component Value Date/Time    TIBC 171 12/01/2021 02:28 AM     FERRITIN:    Lab Results   Component Value Date/Time    FERRITIN 1,939 01/12/2022 05:10 AM     LIPASE:    Lab Results   Component Value Date/Time    LIPASE 112 08/23/2022 12:12 PM       RADIOLOGY:  US DUP LOWER EXTREMITIES BILATERAL VENOUS   Final Result   No evidence of DVT in either lower extremity.          CTA PULMONARY W CONTRAST   Final Result   1. Pulmonary embolus seen within the segmental and subsegmental pulmonary   arteries of the medial basal segment and posterior basal segment of the right   lower lobe. 2. There are no findings of right ventricular strain   3. There is no pulmonary embolus seen within the pulmonary arteries of the   left lung   4. Patchy airspace disease within the right lower lobe which could represent   pneumonia or possibly an early pulmonary infarct   5. Linear atelectasis seen within the left lung base   6. Small amount of pleural fluid seen within the right major fissure. CT ABDOMEN PELVIS W IV CONTRAST Additional Contrast? None   Final Result   Trace to small right pleural effusion and adjacent atelectasis at the right   lung base      No acute findings of the abdomen or pelvis. Fat containing infraumbilical   hernia measuring 2.4 cm neck of hernia without associated bowel. ASSESSMENT:      Active Hospital Problems    Diagnosis     Acute pulmonary embolism ([facility] [I26.99]--unprovoked?       Priority: Medium    H/O heart artery stent [Z95.5]      Priority: Medium    Pulmonary embolism and infarction [facility]) [I26.99]      Priority: Medium    Pulmonary hypertension, unspecified (HCC) [I27.20]     Obesity, Class III, BMI 40-49.9 (morbid obesity) ([facility] [facility] [E66.01]     Essential hypertension [I10]     Diabetes mellitus type 2 with complications ([facility] [address] [facility] [Q77.0]     Hyperlipidemia [E78.5]     ESRD (end stage renal disease) ([facility] [facility] [N18.6]        PLAN:  Medications discussed with patient  GI prophylaxis  DVT prophylaxis  Consultants notes reviewed  Aspirin 81 mg daily  PhosLo 667, 1 capsule 3 times daily  Enoxaparin 1 mg/kg subcu  Low-dose sliding scale insulin  She received 1 dose of Norflex 60 mg IV once  Hemodialysis 3 times a week  Ultrasound Doppler of lower extremities without evidence of DVT  Tumor markers in view of unprovoked pulmonary embolus  For carb low-fat low-salt diet  Consult to nephrology  Consult to pulmonary  2D echo            Please note that over 50 minutes was spent in evaluating the patient, review of records and results, discussion with staff/family, etc.    [address]    2:53 PM  12/3/2022    Voice recognition software use for dictation

## 2022-12-03 NOTE — FLOWSHEET NOTE
12/03/22 1715   Vital Signs   BP (!) 185/88   Temp 98.1 °F (36.7 °C)   Heart Rate 96   Resp 20   Weight 257 lb 11.5 oz (116.9 kg)   Weight Method Bed scale   Percent Weight Change -0.85   Pain Assessment   Pain Assessment None - Denies Pain   Post-Hemodialysis Assessment   Post-Treatment Procedures Blood returned; Access bleeding time < 10 minutes   Machine Disinfection Process Acid/Vinegar Clean;Heat Disinfect; Exterior Machine Disinfection   Blood Volume Processed (Liters) 98.4 l/min   Dialyzer Clearance Clear   Duration of Treatment (minutes) 240 minutes   Hemodialysis Intake (ml) 300 ml   Hemodialysis Output (ml) 1300 ml   NET Removed (ml) 1000   Tolerated Treatment Fair   Patient Response to Treatment Some cramping   Bilateral Breath Sounds Diminished   Edema Right upper extremity; Left upper extremity; Left lower extremity;Right lower extremity   RUE Edema +1;Non-pitting   LUE Edema +1;Non-pitting   RLE Edema Pitting;+2   LLE Edema Pitting;+2   Time Off 1701   Patient Disposition Return to room

## 2022-12-03 NOTE — ED NOTES
[facility] faxed, spoke to floor, confirmation received, pt to be transported momentarily.       Kb Snow RN  12/03/22 [id number]

## 2022-12-03 NOTE — ED PROVIDER NOTES
Patient reports vague symptom of mild left-sided flank pain beginning last night. On her way to dialysis today her pain became severely worse. She denies nausea vomiting. Movement increases the pain. As an aside, patient reports some mild shortness of breath of recent. She had negative COVID at home. Pulse ox in the low 80s upon arrival.  Improved with nasal cannula oxygen. The history is provided by the patient. Abdominal Pain  Pain location:  L flank  Pain quality: aching    Pain radiates to:  Does not radiate  Pain severity:  Moderate  Onset quality:  Sudden  Duration:  1 day  Timing:  Constant  Progression:  Worsening  Chronicity:  New  Relieved by:  Not moving  Worsened by: Movement  Ineffective treatments:  None tried  Associated symptoms: no chest pain, no chills, no cough, no diarrhea, no dysuria, no fever, no nausea, no shortness of breath, no sore throat and no vomiting       Review of Systems   Constitutional:  Negative for chills and fever. HENT:  Negative for sinus pressure and sore throat. Eyes:  Negative for pain, discharge and redness. Respiratory:  Negative for cough, shortness of breath and wheezing. Cardiovascular:  Negative for chest pain. Gastrointestinal:  Negative for abdominal distention, diarrhea, nausea and vomiting. Genitourinary:  Positive for flank pain. Negative for dysuria and frequency. Musculoskeletal:  Negative for arthralgias and back pain. Skin:  Negative for rash and wound. Neurological:  Negative for weakness and headaches. Hematological:  Negative for adenopathy. All other systems reviewed and are negative. Physical Exam  Vitals and nursing note reviewed. Constitutional:       General: She is not in acute distress. Appearance: She is well-developed. She is not ill-appearing. HENT:      Head: Normocephalic and atraumatic. Eyes:      Pupils: Pupils are equal, round, and reactive to light.    Cardiovascular:      Rate and Rhythm: Normal rate and regular rhythm. Heart sounds: Normal heart sounds. No murmur heard. Pulmonary:      Effort: Pulmonary effort is normal. No respiratory distress. Breath sounds: Normal breath sounds. No wheezing or rales. Abdominal:      General: Bowel sounds are normal.      Palpations: Abdomen is soft. Tenderness: There is no abdominal tenderness. There is no guarding or rebound. Musculoskeletal:      Cervical back: Normal range of motion and neck supple. Back:       Comments: Tenderness to palpation of the left flank as well as superior along the thoracic vertebrae. No deformity noted. Skin:     General: Skin is warm and dry. Neurological:      Mental Status: She is alert and oriented to person, place, and time. Cranial Nerves: No cranial nerve deficit. Coordination: Coordination normal.        Procedures     MDM     EKG: This EKG is signed and interpreted by me. Rate: 92  Rhythm: Sinus and numerous PVCs, bigeminy. Prolonged QT. Interpretation: no acute changes and non-specific EKG  Comparison: changes compared to previous EKG        Radiology Procedure Waiver   Name: Latha Valle  : 1955  MRN: [id number]    Date:  12/3/22    Time: 6:21 AM EST    Benefits of immediately proceeding with radiology exam(s) without pre-testing outweigh the risks or are not indicated as specified below and therefore the following is/are being waived:    [] Benefits of immediate radiology exam(s) outweigh any risk. OR    Pre-exam testing is not indicated for the following reason(s):  [] Pregnancy test   [] Patients LMP on-time and regular.   [] Patient had Tubal Ligation or has other Contraception Device. [] Patient  is Menopausal or Premenarcheal.    [] Patient had Full or Partial Hysterectomy.     [] Protocol for CT contrast allegry   [] Patient has tolerated well previously   [] Patient does not have a true allergy    [] MRI Questionnaire     [x] BUN/Creatinine   [] Patient age w/no hx of renal dysfunction. [x] Patient on Dialysis. [] Recent Normal Labs. Electronically signed by Summer Molina DO on 12/3/22 at 6:21 AM EST            8:28 AM EST  Discussed results with patient. She is willing to be admitted.         --------------------------------------------- PAST HISTORY ---------------------------------------------  Past Medical History:  has a past medical history of Anemia in CKD (chronic kidney disease), Anxiety and depression, At high risk for falls, Brain aneurysm, CLABSI (central line-associated bloodstream infection), Cough, Diabetes mellitus ([facility] Diabetes mellitus type 2 with complications ([facility] [facility] Dizziness on standing, ESRD (end stage renal disease) ([facility] [address] [facility] ESRD on hemodialysis ([facility] [address] [facility] Essential hypertension, Fever, unspecified, Gastrointestinal hemorrhage, H/O heart artery stent, History of Clostridioides difficile colitis, Hyperlipidemia, Hypertension, MSSA bacteremia, Nausea & vomiting, Obesity, Class III, BMI 40-49.9 (morbid obesity) ([facility] [address] [facility] and Periumbilical abdominal pain. Past Surgical History:  has a past surgical history that includes Cardiac surgery;  section; vascular surgery (N/A, 2021); Upper gastrointestinal endoscopy (N/A, 2021); and Dialysis fistula creation (Left, 2022). Social History:  reports that she quit smoking about 5 years ago. Her smoking use included cigarettes. She has a 10.00 pack-year smoking history. She has never used smokeless tobacco. She reports that she does not drink alcohol and does not use drugs. Family History: family history includes Coronary Art Dis in her brother, father, and mother. The patients home medications have been reviewed.     Allergies: Benzonatate, Morphine, Pcn [penicillins], and Sodium hypochlorite    -------------------------------------------------- RESULTS -------------------------------------------------    LABS:  Results for orders placed or performed during the hospital encounter of 12/03/22   COVID-19, Rapid    Specimen: Nasopharyngeal Swab   Result Value Ref Range    SARS-CoV-2, NAAT Not Detected Not Detected   Rapid influenza A/B antigens    Specimen: Nasopharyngeal   Result Value Ref Range    Influenza A by PCR Not Detected Not Detected    Influenza B by PCR Not Detected Not Detected   Basic metabolic panel   Result Value Ref Range    Sodium 135 132 - 146 mmol/L    Potassium 3.9 3.5 - 5.0 mmol/L    Chloride 90 (L) 98 - 107 mmol/L    CO2 28 22 - 29 mmol/L    Anion Gap 17 (H) 7 - 16 mmol/L    Glucose 370 (H) 74 - 99 mg/dL    BUN 40 (H) 6 - 23 mg/dL    Creatinine 6.0 (H) 0.5 - 1.0 mg/dL    Est, Glom Filt Rate 7 >=60 mL/min/1.73    Calcium 8.3 (L) 8.6 - 10.2 mg/dL   CBC   Result Value Ref Range    WBC 11.4 4.5 - 11.5 E9/L    RBC 3.14 (L) 3.50 - 5.50 E12/L    Hemoglobin 10.1 (L) 11.5 - 15.5 g/dL    Hematocrit 31.7 (L) 34.0 - 48.0 %    .0 (H) 80.0 - 99.9 fL    MCH 32.2 26.0 - 35.0 pg    MCHC 31.9 (L) 32.0 - 34.5 %    RDW 13.7 11.5 - 15.0 fL    Platelets 127 575 - 252 E9/L    MPV 10.7 7.0 - 12.0 fL   Troponin   Result Value Ref Range    Troponin, High Sensitivity 76 (H) 0 - 9 ng/L   Brain Natriuretic Peptide   Result Value Ref Range    Pro-BNP 31,643 (H) 0 - 125 pg/mL   D-Dimer, Quantitative   Result Value Ref Range    D-Dimer, Quant 1765 ng/mL DDU   Magnesium   Result Value Ref Range    Magnesium 2.1 1.6 - 2.6 mg/dL   Hepatic Function Panel   Result Value Ref Range    Total Protein 7.0 6.4 - 8.3 g/dL    Albumin 3.7 3.5 - 5.2 g/dL    Alkaline Phosphatase 89 35 - 104 U/L    ALT 8 0 - 32 U/L    AST 8 0 - 31 U/L    Total Bilirubin 0.4 0.0 - 1.2 mg/dL    Bilirubin, Direct <0.2 0.0 - 0.3 mg/dL    Bilirubin, Indirect see below 0.0 - 1.0 mg/dL   EKG 12 Lead   Result Value Ref Range    Ventricular Rate 92 BPM    Atrial Rate 92 BPM    P-R Interval 168 ms    QRS Duration 92 ms    Q-T Interval 408 ms    QTc Calculation (Bazett) 504 ms P Axis 29 degrees    R Axis 0 degrees    T Axis 81 degrees       RADIOLOGY:  CTA PULMONARY W CONTRAST   Final Result   1. Pulmonary embolus seen within the segmental and subsegmental pulmonary   arteries of the medial basal segment and posterior basal segment of the right   lower lobe. 2. There are no findings of right ventricular strain   3. There is no pulmonary embolus seen within the pulmonary arteries of the   left lung   4. Patchy airspace disease within the right lower lobe which could represent   pneumonia or possibly an early pulmonary infarct   5. Linear atelectasis seen within the left lung base   6. Small amount of pleural fluid seen within the right major fissure. CT ABDOMEN PELVIS W IV CONTRAST Additional Contrast? None   Final Result   Trace to small right pleural effusion and adjacent atelectasis at the right   lung base      No acute findings of the abdomen or pelvis. Fat containing infraumbilical   hernia measuring 2.4 cm neck of hernia without associated bowel.             ------------------------- NURSING NOTES AND VITALS REVIEWED ---------------------------  Date / Time Roomed:  12/3/2022  6:07 AM  ED Bed Assignment:  16/16    The nursing notes within the ED encounter and vital signs as below have been reviewed. Patient Vitals for the past 24 hrs:   BP Temp Temp src Pulse Resp SpO2 Height Weight   12/03/22 0804 (!) 174/95 97.9 °F (36.6 °C) Oral 52 16 99 % 5' 4\" (1.626 m) 260 lb (117.9 kg)       Oxygen Saturation Interpretation: Abnormal    ------------------------------------------ PROGRESS NOTES ------------------------------------------  Re-evaluation(s):  Time: 0820  Patients symptoms are improving  Repeat physical examination is improved    Counseling:  I have spoken with the patient and discussed todays results, in addition to providing specific details for the plan of care and counseling regarding the diagnosis and prognosis.   Their questions are answered at this time and they are agreeable with the plan of admission.    --------------------------------- ADDITIONAL PROVIDER NOTES ---------------------------------  Consultations:  Time: 7718. Spoke with Dr. Brent Sinha. Discussed case. They will admit the patient. This patient's ED course included: a personal history and physicial examination, multiple bedside re-evaluations, cardiac monitoring, and continuous pulse oximetry    This patient has remained hemodynamically stable during their ED course. Diagnosis:  1. Multiple subsegmental pulmonary emboli without acute cor pulmonale (HCC)        Disposition:  Patient's disposition: Admit to telemetry  Patient's condition is stable. Jesus Alberto Conti [address] [place] [address]  12/03/22 8285      8:39 AM EST  Discussed with Dr. Arvind Kimball. He will consult.      Jesus Alberto Conti [address] [place] [address]  12/03/22 2898

## 2022-12-03 NOTE — CONSULTS
[facility]  [address] [facility], [address]  [facility], [address] [facility]    Pulmonary Consultation      Reason for Consult:  sob and back pain    Requesting Physician:  Glo Arreguin MD    CHIEF COMPLAINT:  sob and back pain    History Obtained From:  patient    HISTORY OF PRESENT ILLNESS:                The patient is a 78 y.o. female with significant past medical history of ESRD on HD who presents with increased back pain and sob today. May have noticed some back pain yesterday but wasn't that bad, got worse when went to her HD session so came to hospital, noted to be hypoxic and started on NC. Currently reclining in bed, on 4 li NC, not much cough, slightly better with back pain depending on pain medications and (?) sob depending on pain as problems trying to take a deep breath.      Past Medical History:        Diagnosis Date    Acute pulmonary embolism ([address] 12/03/2022    Anemia in CKD (chronic kidney disease) 11/30/2021    Anxiety and depression 01/19/2022    At high risk for falls 01/19/2022    Brain aneurysm     CLABSI (central line-associated bloodstream infection) 11/19/2021    Cough 01/19/2022    Diabetes mellitus ([address] 2006    Diabetes mellitus type 2 with complications ([address] 08/83/6709    Dizziness on standing 01/19/2022    ESRD (end stage renal disease) ([address] 11/19/2021    ESRD on hemodialysis ([address] 11/19/2021    Essential hypertension 11/30/2021    Fever, unspecified     Gastrointestinal hemorrhage 11/30/2021    H/O heart artery stent 2015    Done in [place]    History of Clostridioides difficile colitis 01/19/2022    Hyperlipidemia     Hypertension     MSSA bacteremia 11/18/2021    Nausea & vomiting 11/18/2021    Obesity, Class III, BMI 40-49.9 (morbid obesity) ([address] 89/42/4756    Periumbilical abdominal pain        Past Surgical History:        Procedure Laterality Date    CARDIAC CATHETERIZATION  2015    Done in  [address] SURGERY       SECTION      COLONOSCOPY  2017    Negative findings    DIALYSIS FISTULA CREATION Left 2022    REVISION AV FISTULA LEFT ARM performed by Ella Orellana MD at [address]    PTCA      PTCA [facility] N/A 2021    EGD BIOPSY performed by Anna Villanueva MD at [address] [place] [address] N/A 2021    CATHETER INSERTION  TUNNELED HEMODIALYSIS, WITH REMOVAL OF TEMPORARY CATHETER performed by Ella Orellana MD at [address]       Current Medications:    Current Facility-Administered Medications: sodium chloride flush 0.9 % injection 10 mL, 10 mL, IntraVENous, PRN  acetaminophen (TYLENOL) tablet 650 mg, 650 mg, Oral, Q8H PRN  aspirin EC tablet 81 mg, 81 mg, Oral, Daily  calcium acetate (PHOSLO) capsule 667 mg, 667 mg, Oral, TID WC  midodrine (PROAMATINE) tablet 10 mg, 10 mg, Oral, Daily PRN  [START ON 2022] pantoprazole (PROTONIX) tablet 40 mg, 40 mg, Oral, QAM AC  glucose chewable tablet 16 g, 4 tablet, Oral, PRN  dextrose bolus 10% 125 mL, 125 mL, IntraVENous, PRN **OR** dextrose bolus 10% 250 mL, 250 mL, IntraVENous, PRN  glucagon (rDNA) injection 1 mg, 1 mg, IntraMUSCular, PRN  dextrose 10 % infusion, , IntraVENous, Continuous PRN  insulin lispro (HUMALOG) injection vial 0-4 Units, 0-4 Units, SubCUTAneous, TID WC  insulin lispro (HUMALOG) injection vial 0-4 Units, 0-4 Units, SubCUTAneous, Nightly  [START ON 2022] epoetin sadia-epbx (RETACRIT) injection 3,520 Units, 30 Units/kg, SubCUTAneous, Once per day on   heparin (porcine) injection 9,350 Units, 80 Units/kg, IntraVENous, PRN  heparin (porcine) injection 4,680 Units, 40 Units/kg, IntraVENous, PRN  heparin 25,000 units in dextrose 5% 250 mL (premix) infusion, 5-30 Units/kg/hr, IntraVENous, Continuous    Allergies:  Benzonatate, Morphine, Pcn [penicillins], and Sodium hypochlorite    Social History:    TOBACCO:   reports that she quit smoking about 36 years ago. Her smoking use included cigarettes. She started smoking about 48 years ago. She has a 12.00 pack-year smoking history. She has never used smokeless tobacco.    Family History:       Problem Relation Age of Onset    Coronary Art Dis Mother          age 62 acute MI    Coronary Art Dis Father          age 62 CVA    Coronary Art Dis Brother        REVIEW OF SYSTEMS:    RESPIRATORY:  sob  MUSCULOSKELETAL:  back pain  Remainder of complete ROS is negative. PHYSICAL EXAM:      Vitals:    BP (!) 185/88   Pulse 96   Temp 98.1 °F (36.7 °C)   Resp 20   Ht 5' 4\" (1.626 m)   Wt 257 lb 11.5 oz (116.9 kg)   SpO2 98%   BMI 44.24 kg/m²     CONSTITUTIONAL:  obese  EYES:  Lids and lashes normal, pupils equal, round and reactive to light, extra ocular muscles intact, sclera clear, conjunctiva normal  ENT:  Normocephalic, without obvious abnormality, atraumatic, sinuses nontender on palpation, external ears without lesions, oral pharynx with moist mucus membranes, tonsils without erythema or exudates, gums normal and good dentition. LUNGS:  minimal ronchi bibasal  CARDIOVASCULAR:  Normal apical impulse, regular rate and rhythm, normal S1 and S2, no S3 or S4, and no murmur noted  ABDOMEN:  No scars, normal bowel sounds, soft, non-distended, non-tender, no masses palpated, no hepatosplenomegally  MUSCULOSKELETAL:  There is no redness, warmth, or swelling of the joints. Full range of motion noted. NEUROLOGIC:  Awake, alert, oriented to name, place and time. Cranial nerves II-XII are grossly intact. DATA:    CBC:   Recent Labs     22  0632   WBC 11.4   HGB 10.1*   HCT 31.7*   .0*          BMP:  Recent Labs     22  0632 22  1209     --    K 3.9  --    CL 90*  --    CO2 28  --    PHOS  --  6.7*   BUN 40*  --    CREATININE 6.0*  --     ALB:3,BILIDIR:3,BILITOT:3,ALKPHOS:3)@    PT/INR: No results for input(s): PROTIME, INR in the last 72 hours.     ABG:   No results for input(s): PH, PO2, PCO2, HCO3, BE, O2SAT, METHB, O2HB, COHB, O2CON, HHB, THB in the last 72 hours. Radiology Review:  CTA chest with (+) new PE right lower lobe arteries, small loculated effusion at fissure, patchy opacity right lower lobe, better aeration left lower lobe and resolution of small patchy opacities rest of lung compared to previous    IMPRESSION/RECOMMENDATIONS:      PE  ESRD on HD  hypoxia    Cont with IV heparin, should be able to switch to Eliquis when stable  Cont with oxygen, taper as tolerated  Cont with HD as per Renal  OOB to chair as tolerated      Time at the bedside, reviewing labs and radiographs, reviewing notes and consultations, discussing with staff and family was more than 55 minutes. Thanks for letting us see this patient in consultation. Please contact us with any questions. Office [phone] or after hours through [employer], x [phone].

## 2022-12-03 NOTE — PROGRESS NOTES
This patient is on medication that requires renal, weight, and/or indication dose adjustment. Date Body Weight IBW  Adjusted BW SCr  CrCl Dialysis status   12/3/2022 260 lb (117.9 kg) Ideal body weight: 54.7 kg (120 lb 9.5 oz)  Adjusted ideal body weight: 80 kg (176 lb 5.7 oz) Serum creatinine: 6 mg/dL (H) 12/03/22 3193  Estimated creatinine clearance: 12 mL/min (A) HD       Pharmacy has dose-adjusted the following medication(s):    Date Previous Order Adjusted Order   12/3/2022 Enoxaparin 40 mg daily Heparin 5000 units three times daily       These changes were made per protocol according to the [address] for Pharmacists. *Please note this dose may need readjusted if patient's condition changes. Please contact pharmacy with any questions regarding these changes.     hallie pandey, [address] [facility]  12/3/2022  10:39 AM

## 2022-12-03 NOTE — CONSULTS
[facility] MD William North MD Elray Harold, MD   Robby Doe, CNP   YOSHI Oneil  Consultation  Patient's Name: Aura Yoon  2:07 PM  12/3/2022    Nephrologist: William Marie MD    Reason for Consult: ESRD management  Requesting Physician:  Kaitlin Blanton MD    Chief Complaint: Chest pain, dyspnea    History Obtained From: Patient, chart    History of Present Ilness:    Mrs. Breanna Preciado is a pleasant  78 y.o. woman known to me from outpatient practice where I follow her for ESRD. She undergoes chronic intermittent hemodialysis on Tuesdays Thursdays and Saturdays at the [facility]. Recent treatments have been without complication. This morning she presented to the outpatient center for her usual dialysis treatment. On the way to dialysis she began experiencing posterior chest pain in the middle of her back not radiating. The pain progressed. Was described as sharp and gripping. Was not dyspneic initially, though notes she has become dyspneic since her arrival.  Dyspnea is relieved with nasal cannula. CTA of chest demonstrated pulmonary embolus. She was given Lovenox. She was transported to the inpatient acute care dialysis center where I performed my evaluation. HD so far has been without complication. BP stable. BFR as prescribed. O2 saturation 100%. Pain somewhat improved though still present. Quite anxious regarding her new diagnosis. Notes also a intermittent irritating cough over the past 2 weeks, occasionally productive of thinnish phlegm, without hemoptysis. Feels she is having postnasal drip. She has some sinus pressure and rhinorrhea but no fever or chill. No dyspnea. No wheeze. No anterior chest pain or palpitations. No peripheral swelling.     Past Medical History:   Diagnosis Date    Acute pulmonary embolism ([address] 12/3/2022    Anemia in CKD (chronic kidney disease) 11/30/2021    Anxiety and depression 2022    At high risk for falls 2022    Brain aneurysm     CLABSI (central line-associated bloodstream infection) 2021    Cough 2022    Diabetes mellitus ([facility]     Diabetes mellitus type 2 with complications ([facility]     Dizziness on standing 2022    ESRD (end stage renal disease) ([facility] 2021    ESRD on hemodialysis ([facility] 2021    Essential hypertension 2021    Fever, unspecified     Gastrointestinal hemorrhage 2021    H/O heart artery stent     History of Clostridioides difficile colitis 2022    Hyperlipidemia     Hypertension     MSSA bacteremia 2021    Nausea & vomiting 2021    Obesity, Class III, BMI 40-49.9 (morbid obesity) ([facility] 1153    Periumbilical abdominal pain        Past Surgical History:   Procedure Laterality Date    CARDIAC SURGERY       SECTION      DIALYSIS FISTULA CREATION Left 2022    REVISION AV FISTULA LEFT ARM performed by Billie Pace MD at [address] [place] [address] N/A 2021    EGD BIOPSY performed by Niurka Clancy MD at [address] [place] [address] N/A 2021    CATHETER INSERTION  TUNNELED HEMODIALYSIS, WITH REMOVAL OF TEMPORARY CATHETER performed by Billie Pace MD at [facility] OR       Family History   Problem Relation Age of Onset    Coronary Art Dis Mother     Coronary Art Dis Father     Coronary Art Dis Brother         reports that she quit smoking about 5 years ago. Her smoking use included cigarettes. She has a 10.00 pack-year smoking history. She has never used smokeless tobacco. She reports that she does not drink alcohol and does not use drugs.     Allergies:  Benzonatate, Morphine, Pcn [penicillins], and Sodium hypochlorite    Current Medications:    sodium chloride flush 0.9 % injection 10 mL, PRN  acetaminophen (TYLENOL) tablet 650 mg, Q8H PRN  aspirin EC tablet 81 mg, Daily  atorvastatin (LIPITOR) tablet 40 mg, Daily  calcium acetate (PHOSLO) capsule 667 mg, TID WC  midodrine (PROAMATINE) tablet 10 mg, Daily PRN  [START ON 12/4/2022] pantoprazole (PROTONIX) tablet 40 mg, QAM AC  glucose chewable tablet 16 g, PRN  dextrose bolus 10% 125 mL, PRN   Or  dextrose bolus 10% 250 mL, PRN  glucagon (rDNA) injection 1 mg, PRN  dextrose 10 % infusion, Continuous PRN  insulin lispro (HUMALOG) injection vial 0-4 Units, TID WC  insulin lispro (HUMALOG) injection vial 0-4 Units, Nightly  heparin (porcine) injection 5,000 Units, Q8H        Review of Systems:   Pertinent items are noted in HPI. Otherwise unremarkable    Physical exam:   BP (!) 103/55   Pulse 53   Temp 98.6 °F (37 °C)   Resp 18   Ht 5' 4\" (1.626 m)   Wt 259 lb 14.8 oz (117.9 kg)   SpO2 98%   BMI 44.62 kg/m²   General: Age-appropriate morbidly obese white woman in no acute distress, though appears anxious, and is mildly tachypneic  HEENT: NC/AT, EOMI, sclera and conjunctiva are clear and anicteric. Mucous membranes moist.  Cardiovascular: regular rate and rhythm, no murmur  Respiratory: No crackles or wheeze. Decreased AE at bases, mild, in part due to splinting as it is painful for her to take deep breaths  Gastrointestinal: soft, nontender, nondistended, NABS  Ext: Scant distal lower extremity edema   Neuro: awake, alert, oriented x3. Moves all 4 extremities. Cranial nerves II through XII grossly intact.   Skin: dry, no rash       Data:   Labs:  CBC with Differential:    Lab Results   Component Value Date/Time    WBC 11.4 12/03/2022 06:32 AM    RBC 3.14 12/03/2022 06:32 AM    HGB 10.1 12/03/2022 06:32 AM    HCT 31.7 12/03/2022 06:32 AM     12/03/2022 06:32 AM    .0 12/03/2022 06:32 AM    MCH 32.2 12/03/2022 06:32 AM    MCHC 31.9 12/03/2022 06:32 AM    RDW 13.7 12/03/2022 06:32 AM    NRBC 0.0 01/13/2022 04:38 AM    LYMPHOPCT 13.4 09/22/2022 12:14 PM    MONOPCT 5.8 09/22/2022 12:14 PM    BASOPCT 0.4 09/22/2022 12:14 PM    MONOSABS 0.47 09/22/2022 12:14 PM    LYMPHSABS 1.08 09/22/2022 12:14 PM    EOSABS 0.50 09/22/2022 12:14 PM    BASOSABS 0.03 09/22/2022 12:14 PM     CMP:    Lab Results   Component Value Date/Time     12/03/2022 06:32 AM    K 3.9 12/03/2022 06:32 AM    K 4.2 08/23/2022 12:12 PM    CL 90 12/03/2022 06:32 AM    CO2 28 12/03/2022 06:32 AM    BUN 40 12/03/2022 06:32 AM    CREATININE 6.0 12/03/2022 06:32 AM    GFRAA 8 09/22/2022 12:14 PM    LABGLOM 7 12/03/2022 06:32 AM    GLUCOSE 370 12/03/2022 06:32 AM    PROT 7.0 12/03/2022 06:32 AM    LABALBU 3.7 12/03/2022 06:32 AM    CALCIUM 8.3 12/03/2022 06:32 AM    BILITOT 0.4 12/03/2022 06:32 AM    ALKPHOS 89 12/03/2022 06:32 AM    AST 8 12/03/2022 06:32 AM    ALT 8 12/03/2022 06:32 AM     Ionized Calcium:  No results found for: IONCA  Magnesium:    Lab Results   Component Value Date/Time    MG 2.1 12/03/2022 06:32 AM     Phosphorus:    Lab Results   Component Value Date/Time    PHOS 6.7 12/03/2022 12:09 PM     U/A:    Lab Results   Component Value Date/Time    COLORU Yellow 11/18/2021 03:19 AM    PHUR 6.0 11/18/2021 03:19 AM    WBCUA 1-3 11/18/2021 03:19 AM    RBCUA 0-1 11/18/2021 03:19 AM    BACTERIA FEW 11/18/2021 03:19 AM    CLARITYU Clear 11/18/2021 03:19 AM    SPECGRAV 1.020 11/18/2021 03:19 AM    LEUKOCYTESUR Negative 11/18/2021 03:19 AM    UROBILINOGEN 0.2 11/18/2021 03:19 AM    BILIRUBINUR Negative 11/18/2021 03:19 AM    BLOODU SMALL 11/18/2021 03:19 AM    GLUCOSEU 500 11/18/2021 03:19 AM     Microalbumen/Creatinine ratio:  No components found for: RUCREAT  Iron Saturation:  No components found for: PERCENTFE  TIBC:    Lab Results   Component Value Date/Time    TIBC 171 12/01/2021 02:28 AM     FERRITIN:    Lab Results   Component Value Date/Time    FERRITIN 1,939 01/12/2022 05:10 AM        Imaging:  US DUP LOWER EXTREMITIES BILATERAL VENOUS   Final Result   No evidence of DVT in either lower extremity. CTA PULMONARY W CONTRAST   Final Result   1.  Pulmonary embolus seen within the segmental and subsegmental pulmonary   arteries of the medial basal segment and posterior basal segment of the right   lower lobe. 2. There are no findings of right ventricular strain   3. There is no pulmonary embolus seen within the pulmonary arteries of the   left lung   4. Patchy airspace disease within the right lower lobe which could represent   pneumonia or possibly an early pulmonary infarct   5. Linear atelectasis seen within the left lung base   6. Small amount of pleural fluid seen within the right major fissure. CT ABDOMEN PELVIS W IV CONTRAST Additional Contrast? None   Final Result   Trace to small right pleural effusion and adjacent atelectasis at the right   lung base      No acute findings of the abdomen or pelvis. Fat containing infraumbilical   hernia measuring 2.4 cm neck of hernia without associated bowel. Assessment  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, renal microvascular atherosclerotic disease, and history of acute kidney injury due to contrast-induced nephropathy  Anemia due to ESRD. 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 Status post Lovenox in the ED     Recommendations  Continue IHD support for solute and volume clearance on Tuesdays Thursdays and Saturdays as per outpatient orders and dry weight  Continue JHONATAN: Retacrit while here  Continue other aspects of renal management  Follow labs, BMP  Continue supportive care      Thank you for the opportunity to participate in the care of your pleasant patient. We look forward to following along with you.         Electronically signed by Lynnette Zavaleta MD on 12/3/2022 at 2:07 PM

## 2022-12-04 LAB
ADENOVIRUS BY PCR: NOT DETECTED
ALBUMIN SERPL-MCNC: 3.4 G/DL (ref 3.5–5.2)
ALP BLD-CCNC: 83 U/L (ref 35–104)
ALT SERPL-CCNC: 7 U/L (ref 0–32)
ANION GAP SERPL CALCULATED.3IONS-SCNC: 12 MMOL/L (ref 7–16)
APTT: 136.2 SEC (ref 24.5–35.1)
APTT: 223.8 SEC (ref 24.5–35.1)
AST SERPL-CCNC: 10 U/L (ref 0–31)
BASOPHILS ABSOLUTE: 0.06 E9/L (ref 0–0.2)
BASOPHILS RELATIVE PERCENT: 0.5 % (ref 0–2)
BILIRUB SERPL-MCNC: 0.3 MG/DL (ref 0–1.2)
BILIRUBIN DIRECT: <0.2 MG/DL (ref 0–0.3)
BILIRUBIN, INDIRECT: ABNORMAL MG/DL (ref 0–1)
BORDETELLA PARAPERTUSSIS BY PCR: NOT DETECTED
BORDETELLA PERTUSSIS BY PCR: NOT DETECTED
BUN BLDV-MCNC: 21 MG/DL (ref 6–23)
C-REACTIVE PROTEIN: 29 MG/DL (ref 0–0.4)
CALCIUM IONIZED: 0.99 MMOL/L (ref 1.15–1.33)
CALCIUM SERPL-MCNC: 7.8 MG/DL (ref 8.6–10.2)
CHLAMYDOPHILIA PNEUMONIAE BY PCR: NOT DETECTED
CHLORIDE BLD-SCNC: 92 MMOL/L (ref 98–107)
CHOLESTEROL, TOTAL: 155 MG/DL (ref 0–199)
CO2: 29 MMOL/L (ref 22–29)
CORONAVIRUS 229E BY PCR: NOT DETECTED
CORONAVIRUS HKU1 BY PCR: NOT DETECTED
CORONAVIRUS NL63 BY PCR: NOT DETECTED
CORONAVIRUS OC43 BY PCR: NOT DETECTED
CREAT SERPL-MCNC: 3.7 MG/DL (ref 0.5–1)
EOSINOPHILS ABSOLUTE: 0.28 E9/L (ref 0.05–0.5)
EOSINOPHILS RELATIVE PERCENT: 2.4 % (ref 0–6)
FERRITIN: 1986 NG/ML
GFR SERPL CREATININE-BSD FRML MDRD: 13 ML/MIN/1.73
GLUCOSE BLD-MCNC: 302 MG/DL (ref 74–99)
HCT VFR BLD CALC: 30.4 % (ref 34–48)
HDLC SERPL-MCNC: 52 MG/DL
HEMOGLOBIN: 9.8 G/DL (ref 11.5–15.5)
HUMAN METAPNEUMOVIRUS BY PCR: NOT DETECTED
HUMAN RHINOVIRUS/ENTEROVIRUS BY PCR: NOT DETECTED
IMMATURE GRANULOCYTES #: 0.06 E9/L
IMMATURE GRANULOCYTES %: 0.5 % (ref 0–5)
INFLUENZA A BY PCR: NOT DETECTED
INFLUENZA B BY PCR: NOT DETECTED
IRON SATURATION: 15 % (ref 15–50)
IRON: 25 MCG/DL (ref 37–145)
LACTIC ACID: 1.3 MMOL/L (ref 0.5–2.2)
LDL CHOLESTEROL CALCULATED: 85 MG/DL (ref 0–99)
LYMPHOCYTES ABSOLUTE: 1.2 E9/L (ref 1.5–4)
LYMPHOCYTES RELATIVE PERCENT: 10.2 % (ref 20–42)
MAGNESIUM: 2 MG/DL (ref 1.6–2.6)
MCH RBC QN AUTO: 32.3 PG (ref 26–35)
MCHC RBC AUTO-ENTMCNC: 32.2 % (ref 32–34.5)
MCV RBC AUTO: 100.3 FL (ref 80–99.9)
METER GLUCOSE: 316 MG/DL (ref 74–99)
METER GLUCOSE: 323 MG/DL (ref 74–99)
METER GLUCOSE: 328 MG/DL (ref 74–99)
METER GLUCOSE: 328 MG/DL (ref 74–99)
MONOCYTES ABSOLUTE: 0.81 E9/L (ref 0.1–0.95)
MONOCYTES RELATIVE PERCENT: 6.9 % (ref 2–12)
MYCOPLASMA PNEUMONIAE BY PCR: NOT DETECTED
NEUTROPHILS ABSOLUTE: 9.3 E9/L (ref 1.8–7.3)
NEUTROPHILS RELATIVE PERCENT: 79.5 % (ref 43–80)
PARAINFLUENZA VIRUS 1 BY PCR: NOT DETECTED
PARAINFLUENZA VIRUS 2 BY PCR: NOT DETECTED
PARAINFLUENZA VIRUS 3 BY PCR: NOT DETECTED
PARAINFLUENZA VIRUS 4 BY PCR: NOT DETECTED
PDW BLD-RTO: 13.6 FL (ref 11.5–15)
PHOSPHORUS: 4 MG/DL (ref 2.5–4.5)
PLATELET # BLD: 182 E9/L (ref 130–450)
PMV BLD AUTO: 10.5 FL (ref 7–12)
POTASSIUM SERPL-SCNC: 3.8 MMOL/L (ref 3.5–5)
PRO-BNP: ABNORMAL PG/ML (ref 0–125)
RBC # BLD: 3.03 E12/L (ref 3.5–5.5)
RESPIRATORY SYNCYTIAL VIRUS BY PCR: NOT DETECTED
SARS-COV-2, PCR: NOT DETECTED
SEDIMENTATION RATE, ERYTHROCYTE: 99 MM/HR (ref 0–20)
SODIUM BLD-SCNC: 133 MMOL/L (ref 132–146)
TOTAL IRON BINDING CAPACITY: 170 MCG/DL (ref 250–450)
TOTAL PROTEIN: 6.7 G/DL (ref 6.4–8.3)
TRIGL SERPL-MCNC: 90 MG/DL (ref 0–149)
URIC ACID, SERUM: 3.3 MG/DL (ref 2.4–5.7)
VLDLC SERPL CALC-MCNC: 18 MG/DL
WBC # BLD: 11.7 E9/L (ref 4.5–11.5)

## 2022-12-04 PROCEDURE — 83735 ASSAY OF MAGNESIUM: CPT

## 2022-12-04 PROCEDURE — 80048 BASIC METABOLIC PNL TOTAL CA: CPT

## 2022-12-04 PROCEDURE — 2500000003 HC RX 250 WO HCPCS: Performed by: INTERNAL MEDICINE

## 2022-12-04 PROCEDURE — 84100 ASSAY OF PHOSPHORUS: CPT

## 2022-12-04 PROCEDURE — 6370000000 HC RX 637 (ALT 250 FOR IP): Performed by: INTERNAL MEDICINE

## 2022-12-04 PROCEDURE — 36415 COLL VENOUS BLD VENIPUNCTURE: CPT

## 2022-12-04 PROCEDURE — 82330 ASSAY OF CALCIUM: CPT

## 2022-12-04 PROCEDURE — 6360000004 HC RX CONTRAST MEDICATION: Performed by: INTERNAL MEDICINE

## 2022-12-04 PROCEDURE — 82728 ASSAY OF FERRITIN: CPT

## 2022-12-04 PROCEDURE — 82962 GLUCOSE BLOOD TEST: CPT

## 2022-12-04 PROCEDURE — 6360000002 HC RX W HCPCS: Performed by: INTERNAL MEDICINE

## 2022-12-04 PROCEDURE — 84550 ASSAY OF BLOOD/URIC ACID: CPT

## 2022-12-04 PROCEDURE — 85730 THROMBOPLASTIN TIME PARTIAL: CPT

## 2022-12-04 PROCEDURE — 2700000000 HC OXYGEN THERAPY PER DAY

## 2022-12-04 PROCEDURE — 2060000000 HC ICU INTERMEDIATE R&B

## 2022-12-04 PROCEDURE — 87449 NOS EACH ORGANISM AG IA: CPT

## 2022-12-04 PROCEDURE — 80061 LIPID PANEL: CPT

## 2022-12-04 PROCEDURE — 83605 ASSAY OF LACTIC ACID: CPT

## 2022-12-04 PROCEDURE — 87088 URINE BACTERIA CULTURE: CPT

## 2022-12-04 PROCEDURE — 83880 ASSAY OF NATRIURETIC PEPTIDE: CPT

## 2022-12-04 PROCEDURE — 85025 COMPLETE CBC W/AUTO DIFF WBC: CPT

## 2022-12-04 PROCEDURE — 85651 RBC SED RATE NONAUTOMATED: CPT

## 2022-12-04 PROCEDURE — 83540 ASSAY OF IRON: CPT

## 2022-12-04 PROCEDURE — 80076 HEPATIC FUNCTION PANEL: CPT

## 2022-12-04 PROCEDURE — 83550 IRON BINDING TEST: CPT

## 2022-12-04 PROCEDURE — 86140 C-REACTIVE PROTEIN: CPT

## 2022-12-04 PROCEDURE — 93306 TTE W/DOPPLER COMPLETE: CPT

## 2022-12-04 RX ORDER — INSULIN LISPRO 100 [IU]/ML
3 INJECTION, SOLUTION INTRAVENOUS; SUBCUTANEOUS
Status: DISCONTINUED | OUTPATIENT
Start: 2022-12-04 | End: 2022-12-05

## 2022-12-04 RX ORDER — INSULIN GLARGINE 100 [IU]/ML
5 INJECTION, SOLUTION SUBCUTANEOUS 2 TIMES DAILY
Status: DISCONTINUED | OUTPATIENT
Start: 2022-12-04 | End: 2022-12-05

## 2022-12-04 RX ADMIN — INSULIN LISPRO 3 UNITS: 100 INJECTION, SOLUTION INTRAVENOUS; SUBCUTANEOUS at 16:17

## 2022-12-04 RX ADMIN — INSULIN LISPRO 4 UNITS: 100 INJECTION, SOLUTION INTRAVENOUS; SUBCUTANEOUS at 23:23

## 2022-12-04 RX ADMIN — ASPIRIN 81 MG: 81 TABLET, COATED ORAL at 08:16

## 2022-12-04 RX ADMIN — INSULIN GLARGINE 5 UNITS: 100 INJECTION, SOLUTION SUBCUTANEOUS at 23:23

## 2022-12-04 RX ADMIN — APIXABAN 10 MG: 5 TABLET, FILM COATED ORAL at 23:20

## 2022-12-04 RX ADMIN — CALCIUM ACETATE 667 MG: 667 CAPSULE ORAL at 16:21

## 2022-12-04 RX ADMIN — INSULIN LISPRO 3 UNITS: 100 INJECTION, SOLUTION INTRAVENOUS; SUBCUTANEOUS at 11:15

## 2022-12-04 RX ADMIN — APIXABAN 10 MG: 5 TABLET, FILM COATED ORAL at 14:43

## 2022-12-04 RX ADMIN — INSULIN LISPRO 3 UNITS: 100 INJECTION, SOLUTION INTRAVENOUS; SUBCUTANEOUS at 07:20

## 2022-12-04 RX ADMIN — HEPARIN SODIUM 21 UNITS/KG/HR: 10000 INJECTION, SOLUTION INTRAVENOUS at 05:46

## 2022-12-04 RX ADMIN — FENTANYL CITRATE 50 MCG: 50 INJECTION, SOLUTION INTRAMUSCULAR; INTRAVENOUS at 02:16

## 2022-12-04 RX ADMIN — HEPARIN SODIUM 18 UNITS/KG/HR: 10000 INJECTION, SOLUTION INTRAVENOUS at 11:55

## 2022-12-04 RX ADMIN — PERFLUTREN 1.5 ML: 6.52 INJECTION, SUSPENSION INTRAVENOUS at 08:26

## 2022-12-04 RX ADMIN — ACETAMINOPHEN 650 MG: 325 TABLET ORAL at 23:20

## 2022-12-04 ASSESSMENT — PAIN SCALES - GENERAL
PAINLEVEL_OUTOF10: 3
PAINLEVEL_OUTOF10: 2

## 2022-12-04 ASSESSMENT — PAIN DESCRIPTION - DESCRIPTORS: DESCRIPTORS: ACHING

## 2022-12-04 ASSESSMENT — PAIN DESCRIPTION - PAIN TYPE: TYPE: ACUTE PAIN

## 2022-12-04 ASSESSMENT — PAIN DESCRIPTION - ORIENTATION: ORIENTATION: MID

## 2022-12-04 ASSESSMENT — PAIN DESCRIPTION - LOCATION: LOCATION: HEAD

## 2022-12-04 NOTE — CARE COORDINATION
Introduced my self and provided explanation of CM role to patient. Her daughter Cecilia Hodge is at bedside. Patient is awake, alert, and aware of current diagnosis and treatment plan including pulm consult, heparin drip. She voices she resides at home with her daughter and completes her adl's with her daughter's assistance. Patient is established with a pcp. She voices she pays privately for prescriptions and is familiar with [facility]  Informed by nursing staff of potential  new script for Eliquis. Met with patient and presented them with a  30 day free supply coupon to use on first fill of drug at retail pharmacy. Patient voiced understanding and was thankful for the information. She will reach out to  to see if she qualifies for any patient assistance programs. She denies other home going needs at this time. Flow sheets denote room air, however, nasal canula is indwelling. Will have nursing staff document ambulatory pulse oximetry. Hien Aguirre.  Suzi, MSN, RN  [facility] Case Management  [phone]

## 2022-12-04 NOTE — PROGRESS NOTES
[facility]  [address], [address], [address]      Pulmonary Progress Note      SUBJECTIVE:  sitting up in chair on 4 li NC, claims doing ok with breathing, (?) pain less as slightly better with control, ambulating some and tolerating.     OBJECTIVE    Medications    Continuous Infusions:   dextrose      heparin (PORCINE) Infusion 18 Units/kg/hr (22 1155)       Scheduled Meds:   insulin glargine  5 Units SubCUTAneous BID    insulin lispro  3 Units SubCUTAneous TID WC    aspirin  81 mg Oral Daily    calcium acetate  667 mg Oral TID WC    pantoprazole  40 mg Oral QAM AC    insulin lispro  0-4 Units SubCUTAneous TID WC    insulin lispro  0-4 Units SubCUTAneous Nightly    [START ON 2022] epoetin sadia-epbx  30 Units/kg SubCUTAneous Once per day on        PRN Meds:sodium chloride flush, acetaminophen, midodrine, glucose, dextrose bolus **OR** dextrose bolus, glucagon (rDNA), dextrose, heparin (porcine), heparin (porcine), fentanNYL    Physical    VITALS:  BP (!) 150/86   Pulse (!) 101   Temp 98.1 °F (36.7 °C) (Oral)   Resp 20   Ht 5' 4\" (1.626 m)   Wt 257 lb 11.5 oz (116.9 kg)   SpO2 100%   BMI 44.24 kg/m²     24HR INTAKE/OUTPUT:      Intake/Output Summary (Last 24 hours) at 2022 1415  Last data filed at 2022 1239  Gross per 24 hour   Intake 300 ml   Output 1700 ml   Net -1400 ml       24HR PULSE OXIMETRY RANGE:    SpO2  Av %  Min: 98 %  Max: 100 %    General appearance: alert, appears stated age, and cooperative, obese  Lungs: rhonchi bibasilar minimal  Heart: regular rate and rhythm, S1, S2 normal, no murmur, click, rub or gallop  Abdomen: soft, non-tender; bowel sounds normal; no masses,  no organomegaly  Extremities: extremities normal, atraumatic, no cyanosis or edema  Neurologic: Mental status: Alert, oriented, thought content appropriate    Data    CBC:   Recent Labs     22  1209 12/04/22  0530   WBC 11.4 11.7*   HGB 10.1* 9.8*   HCT 31.7* 30.4*   .0* 100.3*    182       BMP:  Recent Labs     12/03/22  0632 12/03/22  1209 12/04/22  0530     --  133   K 3.9  --  3.8   CL 90*  --  92*   CO2 28  --  29   PHOS  --  6.7* 4.0   BUN 40*  --  21   CREATININE 6.0*  --  3.7*    ALB:3,BILIDIR:3,BILITOT:3,ALKPHOS:3)@    PT/INR: No results for input(s): PROTIME, INR in the last 72 hours. ABG:   No results for input(s): PH, PO2, PCO2, HCO3, BE, O2SAT, METHB, O2HB, COHB, O2CON, HHB, THB in the last 72 hours. Radiology/Other tests reviewed: none    Assessment:     Principal Problem:    Acute pulmonary embolism (HCC)  Active Problems:    H/O heart artery stent    Pulmonary embolism and infarction (HCC)    Diabetes mellitus ([facility]    ESRD (end stage renal disease) ([facility]    Essential hypertension    Hyperlipidemia    Diabetes mellitus type 2 with complications ([facility]    Pulmonary hypertension, unspecified (HCC)    Obesity, Class III, BMI 40-49.9 (morbid obesity) ([facility]  Resolved Problems:    * No resolved hospital problems. *      Plan:       Can switch to eliquis and stop IV heparin once started  Cont with oxygen, taper as tolerated, will need to see if qualifies for home use prior to discharge  Cont with HD as per Renal  OOB to chair as tolerated  Possible discharge once above clarified      Time at the bedside, reviewing labs and radiographs, reviewing notes and consultations, discussing with staff and family was more than 35 minutes. Thanks for letting us see this patient in consultation. Please contact us with any questions. Office [phone] or after hours through [employer], x [phone].

## 2022-12-04 NOTE — PROGRESS NOTES
PROGRESS  NOTE --                                                          INTERNAL  MEDICINE                                                                              I  PERSONALLY SAW , EXAMINED, AND CARED Shanique [address], 12/4/2022     LABS, XRAY ,CHART, AND MEDICATIONS  REVIEWED BY ME       Chief complaint: Flank pain on her way to dialysis, short of breath      12/4/2022-SUBJECTIVE: Aura Yoon is alert awake and cooperative; oriented ×3. Denies any dyspnea nausea emesis. Tolerating diet. No abdominal pain. Still having some pleuritic pain mostly in her back area. Currently sitting in bedside chair family present. I discussed at length current A1c of 12.7, need for better control need for endocrinology consult. I also discussed case with pulmonary, if no procedures planned, apixaban will be started earlier rather than later. Currently on heparin drip. Afebrile last 24 hours. Pulse 101. Respirations 20 blood pressure 150/86. SPO2 100 on room air. Intake and output -1400 cc. BUN 21 creatinine 3.7. Ionized calcium low at 0.99. Total calcium low at 7.8. Fasting glucose 302. CRP 29.0 slightly worse; proBNP 29,968, slightly better. LDL 85. Albumin 3.4 liver functions normal.  Hemoglobin 9.8 WBC 11.7. ESR 99. Ferritin 1986; iron slightly low at 25 iron saturation normal TIBC low at 170. A1c elevated at 12.3. Blood cultures in process. Molecular/viral respiratory panel all not detected. Pulmonary consult yesterday appreciated. Continue with IV heparin drip, switch to apixaban when stable. Last evening Tylenol not helping her flank and back pain, fentanyl was added IV as needed. Patient did have hemodialysis yesterday with net removal of 1000 cc. 2D echo has been completed, interpretation pending.       Objective:     PHYSICAL EXAM:    VS: BP (!) 150/86   Pulse (!) 101   Temp 98.1 °F (36.7 °C) (Oral) Resp 20   Ht 5' 4\" (1.626 m)   Wt 257 lb 11.5 oz (116.9 kg)   SpO2 100%   BMI 44.24 kg/m²     Labs:   CBC:   Lab Results   Component Value Date/Time    WBC 11.7 12/04/2022 05:30 AM    RBC 3.03 12/04/2022 05:30 AM    HGB 9.8 12/04/2022 05:30 AM    HCT 30.4 12/04/2022 05:30 AM    .3 12/04/2022 05:30 AM    MCH 32.3 12/04/2022 05:30 AM    MCHC 32.2 12/04/2022 05:30 AM    RDW 13.6 12/04/2022 05:30 AM     12/04/2022 05:30 AM    MPV 10.5 12/04/2022 05:30 AM     CBC with Differential:    Lab Results   Component Value Date/Time    WBC 11.7 12/04/2022 05:30 AM    RBC 3.03 12/04/2022 05:30 AM    HGB 9.8 12/04/2022 05:30 AM    HCT 30.4 12/04/2022 05:30 AM     12/04/2022 05:30 AM    .3 12/04/2022 05:30 AM    MCH 32.3 12/04/2022 05:30 AM    MCHC 32.2 12/04/2022 05:30 AM    RDW 13.6 12/04/2022 05:30 AM    NRBC 0.0 01/13/2022 04:38 AM    LYMPHOPCT 10.2 12/04/2022 05:30 AM    MONOPCT 6.9 12/04/2022 05:30 AM    BASOPCT 0.5 12/04/2022 05:30 AM    MONOSABS 0.81 12/04/2022 05:30 AM    LYMPHSABS 1.20 12/04/2022 05:30 AM    EOSABS 0.28 12/04/2022 05:30 AM    BASOSABS 0.06 12/04/2022 05:30 AM     Hemoglobin/Hematocrit:    Lab Results   Component Value Date/Time    HGB 9.8 12/04/2022 05:30 AM    HCT 30.4 12/04/2022 05:30 AM     CMP:    Lab Results   Component Value Date/Time     12/04/2022 05:30 AM    K 3.8 12/04/2022 05:30 AM    K 4.2 08/23/2022 12:12 PM    CL 92 12/04/2022 05:30 AM    CO2 29 12/04/2022 05:30 AM    BUN 21 12/04/2022 05:30 AM    CREATININE 3.7 12/04/2022 05:30 AM    GFRAA 8 09/22/2022 12:14 PM    LABGLOM 13 12/04/2022 05:30 AM    GLUCOSE 302 12/04/2022 05:30 AM    PROT 6.7 12/04/2022 05:30 AM    LABALBU 3.4 12/04/2022 05:30 AM    CALCIUM 7.8 12/04/2022 05:30 AM    BILITOT 0.3 12/04/2022 05:30 AM    ALKPHOS 83 12/04/2022 05:30 AM    AST 10 12/04/2022 05:30 AM    ALT 7 12/04/2022 05:30 AM     BMP:    Lab Results   Component Value Date/Time     12/04/2022 05:30 AM    K 3.8 12/04/2022 05:30 AM    K 4.2 08/23/2022 12:12 PM    CL 92 12/04/2022 05:30 AM    CO2 29 12/04/2022 05:30 AM    BUN 21 12/04/2022 05:30 AM    LABALBU 3.4 12/04/2022 05:30 AM    CREATININE 3.7 12/04/2022 05:30 AM    CALCIUM 7.8 12/04/2022 05:30 AM    GFRAA 8 09/22/2022 12:14 PM    LABGLOM 13 12/04/2022 05:30 AM    GLUCOSE 302 12/04/2022 05:30 AM     Hepatic Function Panel:    Lab Results   Component Value Date/Time    ALKPHOS 83 12/04/2022 05:30 AM    ALT 7 12/04/2022 05:30 AM    AST 10 12/04/2022 05:30 AM    PROT 6.7 12/04/2022 05:30 AM    BILITOT 0.3 12/04/2022 05:30 AM    BILIDIR <0.2 12/04/2022 05:30 AM    IBILI see below 12/04/2022 05:30 AM    LABALBU 3.4 12/04/2022 05:30 AM     Ionized Calcium:  No results found for: IONCA  Magnesium:    Lab Results   Component Value Date/Time    MG 2.0 12/04/2022 05:30 AM     Phosphorus:    Lab Results   Component Value Date/Time    PHOS 4.0 12/04/2022 05:30 AM     LDH:    Lab Results   Component Value Date/Time     01/11/2022 12:45 PM     Uric Acid:    Lab Results   Component Value Date/Time    LABURIC 3.3 12/04/2022 05:30 AM     PT/INR:    Lab Results   Component Value Date/Time    PROTIME 11.0 08/23/2022 12:12 PM    INR 1.0 08/23/2022 12:12 PM     Warfarin PT/INR:  No components found for: PTPATWAR, PTINRWAR  PTT:    Lab Results   Component Value Date/Time    APTT 136.2 12/04/2022 09:18 AM   [APTT}  Troponin:  No results found for: TROPONINI  Last 3 Troponin:  No results found for: TROPONINI  U/A:    Lab Results   Component Value Date/Time    COLORU Yellow 11/18/2021 03:19 AM    PROTEINU >=300 11/18/2021 03:19 AM    PHUR 6.0 11/18/2021 03:19 AM    WBCUA 1-3 11/18/2021 03:19 AM    RBCUA 0-1 11/18/2021 03:19 AM    BACTERIA FEW 11/18/2021 03:19 AM    CLARITYU Clear 11/18/2021 03:19 AM    SPECGRAV 1.020 11/18/2021 03:19 AM    LEUKOCYTESUR Negative 11/18/2021 03:19 AM    UROBILINOGEN 0.2 11/18/2021 03:19 AM    BILIRUBINUR Negative 11/18/2021 03:19 AM    BLOODU SMALL infarction ([facility]    Diabetes mellitus ([facility]    ESRD (end stage renal disease) ([facility]    Essential hypertension    Hyperlipidemia    Diabetes mellitus type 2 with complications ([facility]    Pulmonary hypertension, unspecified (HCC)    Obesity, Class III, BMI 40-49.9 (morbid obesity) ([facility]  Resolved Problems:    * No resolved hospital problems. *      PLAN:  SEE ORDERS      RE  CHANGES AND FINDINGS   Medications reviewed with patient  GI prophylaxis  DVT prophylaxis  Consultants notes reviewed    Aspirin 81 mg daily  PhosLo 667, 1 capsule 3 times daily  Low-dose sliding scale insulin  Protonix 40 mg daily  Heparin drip IV  Acetaminophen 650 mg every 8 hours as needed for pain  Fentanyl 50 mcg IV every 6 hours as needed for pain  Endocrinology consult regarding poorly controlled diabetes; patient had stopped metformin and was controlling her glucose with \"diet\". Conversion to apixaban when okay with pulmonary      Discussed with patient and family and nursing. Trena Anne DO     1:02 PM     12/4/2022    TIME > 35 MINUTES    >  50 %  OF  TIME  DISCUSSION               ------------  INFORMATION  -----------      DIET:ADULT DIET; Regular; 4 carb choices (60 gm/meal); Low Fat/Low Chol/High Fiber/CHELSEA        Allergies   Allergen Reactions    Benzonatate Other (See Comments)     Patient states \"it was like I was drunk. \"    Morphine Itching    Pcn [Penicillins] Rash    Sodium Hypochlorite Nausea And Vomiting and Rash     AKA BLEACH.   RASH FROM SKIN EXPOSURE          MEDICATION SIDE EFFECTS:none       SCHEDULED MEDS:                                 Scheduled Meds:   aspirin  81 mg Oral Daily    calcium acetate  667 mg Oral TID WC    pantoprazole  40 mg Oral QAM AC    insulin lispro  0-4 Units SubCUTAneous TID WC    insulin lispro  0-4 Units SubCUTAneous Nightly    [START ON 12/5/2022] epoetin sadia-epbx  30 Units/kg SubCUTAneous Once per day on Mon Wed Fri       Continuous Infusions:   dextrose      heparin (PORCINE) Infusion 18 Units/kg/hr (12/04/22 1155)         Data:       Intake/Output Summary (Last 24 hours) at 12/4/2022 1302  Last data filed at 12/4/2022 1239  Gross per 24 hour   Intake 300 ml   Output 1700 ml   Net -1400 ml       Wt Readings from Last 3 Encounters:   12/03/22 257 lb 11.5 oz (116.9 kg)   10/03/22 260 lb (117.9 kg)   09/22/22 260 lb (117.9 kg)       Labs: Additional    GLUCOSE:No results for input(s): POCGLU in the last 72 hours. BNP:No results found for: BNP    CRP:   Recent Labs     12/03/22  1209 12/04/22  0530   CRP 23.8* 29.0*       ESR:  Recent Labs     12/04/22  0530   SEDRATE 99*       RADIOLOGY: REVIEWED AVAILABLE REPORT  US DUP LOWER EXTREMITIES BILATERAL VENOUS   Final Result   No evidence of DVT in either lower extremity. CTA PULMONARY W CONTRAST   Final Result   1. Pulmonary embolus seen within the segmental and subsegmental pulmonary   arteries of the medial basal segment and posterior basal segment of the right   lower lobe. 2. There are no findings of right ventricular strain   3. There is no pulmonary embolus seen within the pulmonary arteries of the   left lung   4. Patchy airspace disease within the right lower lobe which could represent   pneumonia or possibly an early pulmonary infarct   5. Linear atelectasis seen within the left lung base   6. Small amount of pleural fluid seen within the right major fissure. CT ABDOMEN PELVIS W IV CONTRAST Additional Contrast? None   Final Result   Trace to small right pleural effusion and adjacent atelectasis at the right   lung base      No acute findings of the abdomen or pelvis. Fat containing infraumbilical   hernia measuring 2.4 cm neck of hernia without associated bowel.                    [address],     1:02 PM     12/4/2022      Voice recognition software used for dictation

## 2022-12-05 LAB
AFP-TUMOR MARKER: 1 NG/ML (ref 0–9)
ALBUMIN SERPL-MCNC: 3.3 G/DL (ref 3.5–5.2)
ALP BLD-CCNC: 81 U/L (ref 35–104)
ALT SERPL-CCNC: 8 U/L (ref 0–32)
ANION GAP SERPL CALCULATED.3IONS-SCNC: 14 MMOL/L (ref 7–16)
AST SERPL-CCNC: 11 U/L (ref 0–31)
BASOPHILS ABSOLUTE: 0.04 E9/L (ref 0–0.2)
BASOPHILS RELATIVE PERCENT: 0.4 % (ref 0–2)
BILIRUB SERPL-MCNC: 0.2 MG/DL (ref 0–1.2)
BILIRUBIN DIRECT: <0.2 MG/DL (ref 0–0.3)
BILIRUBIN, INDIRECT: ABNORMAL MG/DL (ref 0–1)
BUN BLDV-MCNC: 35 MG/DL (ref 6–23)
C-REACTIVE PROTEIN: 30.4 MG/DL (ref 0–0.4)
CALCIUM SERPL-MCNC: 8 MG/DL (ref 8.6–10.2)
CHLORIDE BLD-SCNC: 94 MMOL/L (ref 98–107)
CO2: 28 MMOL/L (ref 22–29)
CREAT SERPL-MCNC: 5.4 MG/DL (ref 0.5–1)
EOSINOPHILS ABSOLUTE: 0.33 E9/L (ref 0.05–0.5)
EOSINOPHILS RELATIVE PERCENT: 3.6 % (ref 0–6)
GFR SERPL CREATININE-BSD FRML MDRD: 8 ML/MIN/1.73
GLUCOSE BLD-MCNC: 192 MG/DL (ref 74–99)
HCT VFR BLD CALC: 28 % (ref 34–48)
HEMOGLOBIN: 9 G/DL (ref 11.5–15.5)
IMMATURE GRANULOCYTES #: 0.04 E9/L
IMMATURE GRANULOCYTES %: 0.4 % (ref 0–5)
L. PNEUMOPHILA SEROGP 1 UR AG: NORMAL
LACTIC ACID: 1.4 MMOL/L (ref 0.5–2.2)
LYMPHOCYTES ABSOLUTE: 1.18 E9/L (ref 1.5–4)
LYMPHOCYTES RELATIVE PERCENT: 12.9 % (ref 20–42)
MAGNESIUM: 2.2 MG/DL (ref 1.6–2.6)
MCH RBC QN AUTO: 32.8 PG (ref 26–35)
MCHC RBC AUTO-ENTMCNC: 32.1 % (ref 32–34.5)
MCV RBC AUTO: 102.2 FL (ref 80–99.9)
METER GLUCOSE: 178 MG/DL (ref 74–99)
METER GLUCOSE: 236 MG/DL (ref 74–99)
METER GLUCOSE: 258 MG/DL (ref 74–99)
METER GLUCOSE: 341 MG/DL (ref 74–99)
MONOCYTES ABSOLUTE: 0.83 E9/L (ref 0.1–0.95)
MONOCYTES RELATIVE PERCENT: 9.1 % (ref 2–12)
NEUTROPHILS ABSOLUTE: 6.7 E9/L (ref 1.8–7.3)
NEUTROPHILS RELATIVE PERCENT: 73.6 % (ref 43–80)
PDW BLD-RTO: 13.6 FL (ref 11.5–15)
PHOSPHORUS: 4.5 MG/DL (ref 2.5–4.5)
PLATELET # BLD: 202 E9/L (ref 130–450)
PMV BLD AUTO: 10.6 FL (ref 7–12)
POTASSIUM SERPL-SCNC: 4.3 MMOL/L (ref 3.5–5)
RBC # BLD: 2.74 E12/L (ref 3.5–5.5)
SEDIMENTATION RATE, ERYTHROCYTE: 124 MM/HR (ref 0–20)
SODIUM BLD-SCNC: 136 MMOL/L (ref 132–146)
STREP PNEUMONIAE ANTIGEN, URINE: NORMAL
TOTAL PROTEIN: 6.5 G/DL (ref 6.4–8.3)
WBC # BLD: 9.1 E9/L (ref 4.5–11.5)

## 2022-12-05 PROCEDURE — 2500000003 HC RX 250 WO HCPCS: Performed by: INTERNAL MEDICINE

## 2022-12-05 PROCEDURE — 82962 GLUCOSE BLOOD TEST: CPT

## 2022-12-05 PROCEDURE — 84100 ASSAY OF PHOSPHORUS: CPT

## 2022-12-05 PROCEDURE — 2060000000 HC ICU INTERMEDIATE R&B

## 2022-12-05 PROCEDURE — 80076 HEPATIC FUNCTION PANEL: CPT

## 2022-12-05 PROCEDURE — 86140 C-REACTIVE PROTEIN: CPT

## 2022-12-05 PROCEDURE — 36415 COLL VENOUS BLD VENIPUNCTURE: CPT

## 2022-12-05 PROCEDURE — 80048 BASIC METABOLIC PNL TOTAL CA: CPT

## 2022-12-05 PROCEDURE — 97165 OT EVAL LOW COMPLEX 30 MIN: CPT

## 2022-12-05 PROCEDURE — 6370000000 HC RX 637 (ALT 250 FOR IP): Performed by: INTERNAL MEDICINE

## 2022-12-05 PROCEDURE — 99222 1ST HOSP IP/OBS MODERATE 55: CPT | Performed by: INTERNAL MEDICINE

## 2022-12-05 PROCEDURE — 2700000000 HC OXYGEN THERAPY PER DAY

## 2022-12-05 PROCEDURE — 83735 ASSAY OF MAGNESIUM: CPT

## 2022-12-05 PROCEDURE — 83605 ASSAY OF LACTIC ACID: CPT

## 2022-12-05 PROCEDURE — 6360000002 HC RX W HCPCS: Performed by: INTERNAL MEDICINE

## 2022-12-05 PROCEDURE — 85025 COMPLETE CBC W/AUTO DIFF WBC: CPT

## 2022-12-05 PROCEDURE — 85651 RBC SED RATE NONAUTOMATED: CPT

## 2022-12-05 RX ORDER — INSULIN LISPRO 100 [IU]/ML
0-6 INJECTION, SOLUTION INTRAVENOUS; SUBCUTANEOUS
Status: DISCONTINUED | OUTPATIENT
Start: 2022-12-06 | End: 2022-12-06 | Stop reason: HOSPADM

## 2022-12-05 RX ORDER — INSULIN GLARGINE 100 [IU]/ML
12 INJECTION, SOLUTION SUBCUTANEOUS NIGHTLY
Status: DISCONTINUED | OUTPATIENT
Start: 2022-12-05 | End: 2022-12-06

## 2022-12-05 RX ORDER — INSULIN LISPRO 100 [IU]/ML
6 INJECTION, SOLUTION INTRAVENOUS; SUBCUTANEOUS
Status: DISCONTINUED | OUTPATIENT
Start: 2022-12-06 | End: 2022-12-06 | Stop reason: HOSPADM

## 2022-12-05 RX ORDER — INSULIN GLARGINE 100 [IU]/ML
18 INJECTION, SOLUTION SUBCUTANEOUS NIGHTLY
Status: DISCONTINUED | OUTPATIENT
Start: 2022-12-05 | End: 2022-12-05

## 2022-12-05 RX ADMIN — INSULIN GLARGINE 5 UNITS: 100 INJECTION, SOLUTION SUBCUTANEOUS at 08:07

## 2022-12-05 RX ADMIN — INSULIN LISPRO 3 UNITS: 100 INJECTION, SOLUTION INTRAVENOUS; SUBCUTANEOUS at 16:00

## 2022-12-05 RX ADMIN — APIXABAN 10 MG: 5 TABLET, FILM COATED ORAL at 08:02

## 2022-12-05 RX ADMIN — INSULIN LISPRO 3 UNITS: 100 INJECTION, SOLUTION INTRAVENOUS; SUBCUTANEOUS at 07:47

## 2022-12-05 RX ADMIN — CALCIUM ACETATE 667 MG: 667 CAPSULE ORAL at 15:39

## 2022-12-05 RX ADMIN — APIXABAN 10 MG: 5 TABLET, FILM COATED ORAL at 19:46

## 2022-12-05 RX ADMIN — INSULIN GLARGINE 12 UNITS: 100 INJECTION, SOLUTION SUBCUTANEOUS at 19:46

## 2022-12-05 RX ADMIN — INSULIN LISPRO 1 UNITS: 100 INJECTION, SOLUTION INTRAVENOUS; SUBCUTANEOUS at 07:46

## 2022-12-05 RX ADMIN — INSULIN LISPRO 3 UNITS: 100 INJECTION, SOLUTION INTRAVENOUS; SUBCUTANEOUS at 11:14

## 2022-12-05 RX ADMIN — ASPIRIN 81 MG: 81 TABLET, COATED ORAL at 08:02

## 2022-12-05 RX ADMIN — PANTOPRAZOLE SODIUM 40 MG: 40 TABLET, DELAYED RELEASE ORAL at 07:48

## 2022-12-05 RX ADMIN — EPOETIN ALFA-EPBX 3520 UNITS: 4000 INJECTION, SOLUTION INTRAVENOUS; SUBCUTANEOUS at 08:05

## 2022-12-05 RX ADMIN — CALCIUM ACETATE 667 MG: 667 CAPSULE ORAL at 10:50

## 2022-12-05 ASSESSMENT — PAIN SCALES - GENERAL: PAINLEVEL_OUTOF10: 0

## 2022-12-05 NOTE — PROGRESS NOTES
ENDOCRINOLOGY INITIAL CONSULTATION NOTE      Date of admission: 12/3/2022  Date of service: 12/5/2022  Admitting physician: Sendy Dewitt DO   Primary Care Physician: Cheko Toledo MD  Consultant physician: Aleksandar Horne MD     Reason for the consultation:  Uncontrolled DM    History of Present Illness: The history is provided by the patient. Accuracy of the patient data is excellent    Luz Valera is a very pleasant 78 y.o. old female with PMH of DM type 2, ESRD of HD and other listed below admitted to [facility] on 12/3/2022 because of CP , endocrine service was consulted for diabetes management. Pt denies fever, chills, N/V/D. CTA of chest demonstrated pulmonary embolus. Prior to admission  The patient was diagnosed with type 2 DM long time ago. Prior to admission patient was supposed to be on Toujeo and Humalog but stopped using all insulin for the past 3-4 months due to insurance issue. Patient has had no hypoglycemic episodes. Patient has not been eating consistent carbohydrate meals, self-blood glucose monitoring has been above goal prior to admission. In addition, patient denied macrovascular or microvascular complications.  The patient is not up to date with yearly diabetic eye exam.   Lab Results   Component Value Date/Time    LABA1C 12.7 12/03/2022 12:09 PM     Inpatient diet:   Carb Restricted diet     Point of care glucose monitoring   (Independently reviewed)   Recent Labs     12/03/22  2117 12/04/22  0717 12/04/22  1113 12/04/22  1616 12/04/22  2318 12/05/22  0746 12/05/22  1052 12/05/22  1541   GLUMET 262* 323* 328* 316* 328* 236* 178* 341*       Past medical history:   Past Medical History:   Diagnosis Date    Acute pulmonary embolism ([address] 12/03/2022    Anemia in CKD (chronic kidney disease) 11/30/2021    Anxiety and depression 01/19/2022    At high risk for falls 01/19/2022    Brain aneurysm     CLABSI (central line-associated bloodstream infection) 11/19/2021    Cough 01/19/2022    Diabetes mellitus ([facility]     Diabetes mellitus type 2 with complications ([facility]     Dizziness on standing 2022    ESRD (end stage renal disease) ([facility] 2021    ESRD on hemodialysis ([facility] 2021    Essential hypertension 2021    Fever, unspecified     Gastrointestinal hemorrhage 2021    H/O heart artery stent 2015    Done in [place]    History of Clostridioides difficile colitis 2022    Hyperlipidemia     Hypertension     MSSA bacteremia 2021    Nausea & vomiting 2021    Obesity, Class III, BMI 40-49.9 (morbid obesity) ([facility]     Periumbilical abdominal pain        Past surgical history:  Past Surgical History:   Procedure Laterality Date    CARDIAC CATHETERIZATION  2015    Done in [address] [place] [address]  2017    Negative findings    DIALYSIS FISTULA CREATION Left 2022    REVISION AV FISTULA LEFT ARM performed by Stew Alfred MD at [address]    PTCA  2015    PTCA [address] [place] [address] N/A 2021    EGD BIOPSY performed by Kim Saunders MD at [address] [place] [address] N/A 2021    CATHETER INSERTION  TUNNELED HEMODIALYSIS, WITH REMOVAL OF TEMPORARY CATHETER performed by Stew Alfred MD at [address] [place] [address] history:   Tobacco:   reports that she quit smoking about 36 years ago. Her smoking use included cigarettes. She started smoking about 48 years ago. She has a 12.00 pack-year smoking history. She has never used smokeless tobacco.  Alcohol:   reports no history of alcohol use. Drugs:   reports no history of drug use. Family history:    Family History   Problem Relation Age of Onset    Coronary Art Dis Mother          age 62 acute MI    Coronary Art Dis Father          age 62 CVA    Coronary Art Dis Brother        Allergy and drug reactions:    Allergies   Allergen Reactions    Benzonatate Other (See Comments) Patient states \"it was like I was drunk. \"    Morphine Itching    Pcn [Penicillins] Rash    Sodium Hypochlorite Nausea And Vomiting and Rash     AKA BLEACH. RASH FROM SKIN EXPOSURE        Scheduled Meds:   insulin glargine  18 Units SubCUTAneous Nightly    [START ON 12/6/2022] insulin lispro  6 Units SubCUTAneous TID WC    [START ON 12/6/2022] insulin lispro  0-6 Units SubCUTAneous TID WC    apixaban  10 mg Oral BID    Followed by    Burke Aden ON 12/11/2022] apixaban  5 mg Oral BID    aspirin  81 mg Oral Daily    calcium acetate  667 mg Oral TID WC    pantoprazole  40 mg Oral QAM AC    insulin lispro  0-4 Units SubCUTAneous Nightly    epoetin sadia-epbx  30 Units/kg SubCUTAneous Once per day on Mon Wed Fri       PRN Meds:   sodium chloride flush, 10 mL, PRN  acetaminophen, 650 mg, Q8H PRN  midodrine, 10 mg, Daily PRN  glucose, 4 tablet, PRN  dextrose bolus, 125 mL, PRN   Or  dextrose bolus, 250 mL, PRN  glucagon (rDNA), 1 mg, PRN  dextrose, , Continuous PRN  fentanNYL, 50 mcg, Q6H PRN      Continuous Infusions:   dextrose         Review of Systems  All systems reviewed. All negative except for symptoms mentioned in HPI     OBJECTIVE    BP (!) 134/54   Pulse 75   Temp 98 °F (36.7 °C) (Oral)   Resp 20   Ht 5' 4\" (1.626 m)   Wt 257 lb 11.5 oz (116.9 kg)   SpO2 96%   BMI 44.24 kg/m²     Intake/Output Summary (Last 24 hours) at 12/5/2022 1836  Last data filed at 12/5/2022 0800  Gross per 24 hour   Intake 240 ml   Output --   Net 240 ml       Physical examination:  General: awake alert, oriented x3  HEENT: normocephalic non traumatic, no exophthalmos   Neck: supple, No thyroid tenderness,  Pulm: good equal air entry no added sounds  CVS: S1 + S2  Abd: soft lax, no tenderness  Skin: warm, no lesions, no rash.  No open wounds, no ulcers   Neuro: CN intact, sensation decreased bilateral , muscle power normal  Psych: normal mood, and affect    Review of Laboratory Data:  I personally reviewed the following labs:   Recent Labs     12/03/22  0632 12/04/22  0530 12/05/22  0310   WBC 11.4 11.7* 9.1   RBC 3.14* 3.03* 2.74*   HGB 10.1* 9.8* 9.0*   HCT 31.7* 30.4* 28.0*   .0* 100.3* 102.2*   MCH 32.2 32.3 32.8   MCHC 31.9* 32.2 32.1   RDW 13.7 13.6 13.6    182 202   MPV 10.7 10.5 10.6     Recent Labs     12/03/22  0632 12/04/22  0530 12/05/22  0310    133 136   K 3.9 3.8 4.3   CL 90* 92* 94*   CO2 28 29 28   BUN 40* 21 35*   CREATININE 6.0* 3.7* 5.4*   GLUCOSE 370* 302* 192*   CALCIUM 8.3* 7.8* 8.0*   PROT 7.0 6.7 6.5   LABALBU 3.7 3.4* 3.3*   BILITOT 0.4 0.3 0.2   ALKPHOS 89 83 81   AST 8 10 11   ALT 8 7 8     Beta-Hydroxybutyrate   Date Value Ref Range Status   01/11/2022 0.46 (H) 0.02 - 0.27 mmol/L Final     Lab Results   Component Value Date/Time    LABA1C 12.7 12/03/2022 12:09 PM    LABA1C 8.9 03/02/2022 01:33 PM     Lab Results   Component Value Date/Time    TSH 1.470 12/03/2022 12:09 PM    T4FREE 1.27 12/03/2022 12:09 PM     Lab Results   Component Value Date/Time    LABA1C 12.7 12/03/2022 12:09 PM    GLUCOSE 192 12/05/2022 03:10 AM     Lab Results   Component Value Date/Time    TRIG 90 12/04/2022 05:30 AM    HDL 52 12/04/2022 05:30 AM    LDLCALC 85 12/04/2022 05:30 AM    CHOL 155 12/04/2022 05:30 AM       Blood culture   Lab Results   Component Value Date/Time    BC 24 Hours no growth 12/03/2022 11:55 AM    BC 5 Days no growth 01/12/2022 05:10 AM       Radiology:  US DUP LOWER EXTREMITIES BILATERAL VENOUS   Final Result   No evidence of DVT in either lower extremity. CTA PULMONARY W CONTRAST   Final Result   1. Pulmonary embolus seen within the segmental and subsegmental pulmonary   arteries of the medial basal segment and posterior basal segment of the right   lower lobe. 2. There are no findings of right ventricular strain   3. There is no pulmonary embolus seen within the pulmonary arteries of the   left lung   4.  Patchy airspace disease within the right lower lobe which could represent   pneumonia or possibly an early pulmonary infarct   5. Linear atelectasis seen within the left lung base   6. Small amount of pleural fluid seen within the right major fissure. CT ABDOMEN PELVIS W IV CONTRAST Additional Contrast? None   Final Result   Trace to small right pleural effusion and adjacent atelectasis at the right   lung base      No acute findings of the abdomen or pelvis. Fat containing infraumbilical   hernia measuring 2.4 cm neck of hernia without associated bowel. Medical Records/Labs/Images review:   I personally reviewed and summarized previous records   All labs and imaging were reviewed independently     [address] [facility], a 78 y.o.-old female seen today for inpatient diabetes management     Diabetes Mellitus type 2  Patient's diabetes is uncontrolled   For now, will change diabetes regimen to:  Lantus 12u nightly   Humalog 6u with meals   Low dose sliding scale   Continue glucose check with meals and at bedtime   Will titrate insulin dose based on the blood glucose trend & insulin requirement  Will arrange for patient to be seen in endocrinology clinic upon discharge for routine diabetes maintenance and prevention. ESRD  On hemodialysis  Patient is at risk for hypoglycemia while receiving insulin due to ESRD  Continue to monitor blood glucose closely    Interdisciplinary plan for communication with healthcare providers:   Consult recommendations were discussed with the Primary Service/Nursing staff      The above issues were reviewed with the patient who understood and agreed with the plan. Thank you for allowing us to participate in the care of this patient. Please do not hesitate to contact us with any additional questions.      Ryne Dinh MD  Endocrinologist, [facility] Diabetes Care and Endocrinology   [address] [place] [address]   Phone: [phone]  Fax: [phone]  --------------------------------------------  An electronic signature was used to authenticate this note.  Xander Rivera MD on 12/5/2022 at 6:36 PM

## 2022-12-05 NOTE — PROGRESS NOTES
[facility] MD Richard Delgadillo MD Ria Gauze, MD Linwood Connors, CASH Echavarria, YOSHI Adam, CASH  Progress Note    12/5/2022    SUBJECTIVE:   12/5: Sitting up in the chair with her daughter present at bedside. Feels better today. Pain has improved. On NC. In NAD. PROBLEM LIST:    Principal Problem:    Acute pulmonary embolism (HCC)  Active Problems:    H/O heart artery stent    Pulmonary embolism and infarction (HCC)    Diabetes mellitus ([facility]    ESRD (end stage renal disease) ([facility]    Essential hypertension    Hyperlipidemia    Diabetes mellitus type 2 with complications ([facility]    Pulmonary hypertension, unspecified (HCC)    Obesity, Class III, BMI 40-49.9 (morbid obesity) ([facility]  Resolved Problems:    * No resolved hospital problems. *         DIET:    ADULT DIET; Regular; 4 carb choices (60 gm/meal);  Low Fat/Low Chol/High Fiber/CHELSEA     MEDS (scheduled):    insulin glargine  5 Units SubCUTAneous BID    insulin lispro  3 Units SubCUTAneous TID WC    apixaban  10 mg Oral BID    Followed by    Anni Bryantr ON 12/11/2022] apixaban  5 mg Oral BID    aspirin  81 mg Oral Daily    calcium acetate  667 mg Oral TID WC    pantoprazole  40 mg Oral QAM AC    insulin lispro  0-4 Units SubCUTAneous TID WC    insulin lispro  0-4 Units SubCUTAneous Nightly    epoetin sadia-epbx  30 Units/kg SubCUTAneous Once per day on Mon Wed Fri       MEDS (infusions):   dextrose         MEDS (prn):  sodium chloride flush, acetaminophen, midodrine, glucose, dextrose bolus **OR** dextrose bolus, glucagon (rDNA), dextrose, fentanNYL    PHYSICAL EXAM:     Patient Vitals for the past 24 hrs:   BP Temp Temp src Pulse Resp SpO2   12/05/22 0800 124/74 98.1 °F (36.7 °C) Oral 69 20 99 %   12/05/22 0645 (!) 149/65 97.5 °F (36.4 °C) Oral 70 20 --   12/04/22 2320 135/68 97.2 °F (36.2 °C) Oral 83 20 97 %   12/04/22 1444 132/67 97.1 °F (36.2 °C) Oral 74 24 99 %   @      Intake/Output Summary (Last 24 hours) at 12/5/2022 1157  Last data filed at 12/5/2022 0800  Gross per 24 hour   Intake 240 ml   Output 400 ml   Net -160 ml         Wt Readings from Last 3 Encounters:   12/03/22 257 lb 11.5 oz (116.9 kg)   10/03/22 260 lb (117.9 kg)   09/22/22 260 lb (117.9 kg)       Constitutional:  in no acute distress  HEENT: NC/AT, EOMI, sclera and conjunctiva are clear and anicteric, mucus membranes moist  Neck: Trachea midline, no JVD  Cardiovascular: S1, S2 regular rhythm. Systolic ejection murmur  Respiratory:  CTAB. No crackles, no wheeze  Gastrointestinal:  Soft, nontender, nondistended, NABS  Ext: +1 BLE edema, feet warm  Skin: dry, no rash  Neuro: awake, alert, interactive      DATA:    Recent Labs     12/03/22  0632 12/04/22  0530 12/05/22  0310   WBC 11.4 11.7* 9.1   HGB 10.1* 9.8* 9.0*   HCT 31.7* 30.4* 28.0*   .0* 100.3* 102.2*    182 202     Recent Labs     12/03/22  0632 12/03/22  1209 12/04/22  0530 12/05/22  0310     --  133 136   K 3.9  --  3.8 4.3   CL 90*  --  92* 94*   CO2 28  --  29 28   MG 2.1  --  2.0 2.2   PHOS  --  6.7* 4.0 4.5   BUN 40*  --  21 35*   CREATININE 6.0*  --  3.7* 5.4*   ALT 8  --  7 8   AST 8  --  10 11   BILIDIR <0.2  --  <0.2 <0.2   BILITOT 0.4  --  0.3 0.2   ALKPHOS 89  --  83 81       No results found for: LABPROT    Assessment  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, renal microvascular atherosclerotic disease, and history of acute kidney injury due to contrast-induced nephropathy  Anemia due to ESRD. 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   Status post Lovenox in the ED    Recommendations  Continue IHD support for solute and volume clearance on Tuesdays Thursdays and Saturdays as per outpatient orders and dry weight  Continue JHONATAN: Retacrit while here  Continue other aspects of renal management  Follow labs, BMP  Continue supportive care Electronically signed by Solange Yañez MD on 12/5/2022 at 11:57 AM

## 2022-12-05 NOTE — CONSULTS
ENDOCRINOLOGY INITIAL CONSULTATION NOTE      Date of admission: 12/3/2022  Date of service: 12/5/2022  Admitting physician: Brock Gilbert DO   Primary Care Physician: Katerina Arambula MD  Consultant physician: Diana Nieves MD     Reason for the consultation:  Uncontrolled DM    History of Present Illness: The history is provided by the patient. Accuracy of the patient data is excellent    Rosie Orantes is a very pleasant 78 y.o. old female with PMH of DM type 2, ESRD of HD and other listed below admitted to [facility] on 12/3/2022 because of CP , endocrine service was consulted for diabetes management. Pt denies fever, chills, N/V/D. CTA of chest demonstrated pulmonary embolus. Prior to admission  The patient was diagnosed with type 2 DM long time ago. Prior to admission patient was supposed to be on Toujeo and Humalog but stopped using all insulin for the past 3-4 months due to insurance issue. Patient has had no hypoglycemic episodes. Patient has not been eating consistent carbohydrate meals, self-blood glucose monitoring has been above goal prior to admission. In addition, patient denied macrovascular or microvascular complications.  The patient is not up to date with yearly diabetic eye exam.   Lab Results   Component Value Date/Time    LABA1C 12.7 12/03/2022 12:09 PM     Inpatient diet:   Carb Restricted diet     Point of care glucose monitoring   (Independently reviewed)   Recent Labs     12/03/22  2117 12/04/22  0717 12/04/22  1113 12/04/22  1616 12/04/22  2318 12/05/22  0746 12/05/22  1052 12/05/22  1541   GLUMET 262* 323* 328* 316* 328* 236* 178* 341*       Past medical history:   Past Medical History:   Diagnosis Date    Acute pulmonary embolism ([address] 12/03/2022    Anemia in CKD (chronic kidney disease) 11/30/2021    Anxiety and depression 01/19/2022    At high risk for falls 01/19/2022    Brain aneurysm     CLABSI (central line-associated bloodstream infection) 11/19/2021    Cough 01/19/2022    Diabetes mellitus ([facility] 2006    Diabetes mellitus type 2 with complications ([facility] 6991    Dizziness on standing 2022    ESRD (end stage renal disease) ([facility] 2021    ESRD on hemodialysis ([facility] 2021    Essential hypertension 2021    Fever, unspecified     Gastrointestinal hemorrhage 2021    H/O heart artery stent 2015    Done in [place] ([facility])    History of Clostridioides difficile colitis 2022    Hyperlipidemia     Hypertension     MSSA bacteremia 2021    Nausea & vomiting 2021    Obesity, Class III, BMI 40-49.9 (morbid obesity) ([facility]     Periumbilical abdominal pain        Past surgical history:  Past Surgical History:   Procedure Laterality Date    CARDIAC CATHETERIZATION  2015    Done in [address] [place] [address]  2017    Negative findings    DIALYSIS FISTULA CREATION Left 2022    REVISION AV FISTULA LEFT ARM performed by Kallie Mccrary MD at [address]    PTCA  2015    PTCA [facility] N/A 2021    EGD BIOPSY performed by Marcelo Lora MD at [address] [address] N/A 2021    CATHETER INSERTION  TUNNELED HEMODIALYSIS, WITH REMOVAL OF TEMPORARY CATHETER performed by [address], MD at [address] history:   Tobacco:   reports that she quit smoking about 36 years ago. Her smoking use included cigarettes. She started smoking about 48 years ago. She has a 12.00 pack-year smoking history. She has never used smokeless tobacco.  Alcohol:   reports no history of alcohol use. Drugs:   reports no history of drug use. Family history:    Family History   Problem Relation Age of Onset    Coronary Art Dis Mother          age 62 acute MI    Coronary Art Dis Father          age 62 CVA    Coronary Art Dis Brother        Allergy and drug reactions:    Allergies   Allergen Reactions    Benzonatate Other (See Comments) Patient states \"it was like I was drunk. \"    Morphine Itching    Pcn [Penicillins] Rash    Sodium Hypochlorite Nausea And Vomiting and Rash     AKA BLEACH. RASH FROM SKIN EXPOSURE        Scheduled Meds:   [START ON 12/6/2022] insulin lispro  6 Units SubCUTAneous TID WC    [START ON 12/6/2022] insulin lispro  0-6 Units SubCUTAneous TID WC    insulin glargine  12 Units SubCUTAneous Nightly    apixaban  10 mg Oral BID    Followed by    Chantale Domingo ON 12/11/2022] apixaban  5 mg Oral BID    aspirin  81 mg Oral Daily    calcium acetate  667 mg Oral TID WC    pantoprazole  40 mg Oral QAM AC    insulin lispro  0-4 Units SubCUTAneous Nightly    epoetin sadia-epbx  30 Units/kg SubCUTAneous Once per day on Mon Wed Fri       PRN Meds:   sodium chloride flush, 10 mL, PRN  acetaminophen, 650 mg, Q8H PRN  midodrine, 10 mg, Daily PRN  glucose, 4 tablet, PRN  dextrose bolus, 125 mL, PRN   Or  dextrose bolus, 250 mL, PRN  glucagon (rDNA), 1 mg, PRN  dextrose, , Continuous PRN  fentanNYL, 50 mcg, Q6H PRN    Continuous Infusions:   dextrose         Review of Systems  All systems reviewed. All negative except for symptoms mentioned in HPI     OBJECTIVE    BP (!) 134/54   Pulse 75   Temp 98 °F (36.7 °C) (Oral)   Resp 20   Ht 5' 4\" (1.626 m)   Wt 257 lb 11.5 oz (116.9 kg)   SpO2 96%   BMI 44.24 kg/m²     Intake/Output Summary (Last 24 hours) at 12/5/2022 1845  Last data filed at 12/5/2022 0800  Gross per 24 hour   Intake 240 ml   Output --   Net 240 ml       Physical examination:  General: awake alert, oriented x3  HEENT: normocephalic non traumatic, no exophthalmos   Neck: supple, No thyroid tenderness,  Pulm: good equal air entry no added sounds  CVS: S1 + S2  Abd: soft lax, no tenderness  Skin: warm, no lesions, no rash.  No open wounds, no ulcers   Neuro: CN intact, sensation decreased bilateral , muscle power normal  Psych: normal mood, and affect    Review of Laboratory Data:  I personally reviewed the following labs:   Recent Labs 12/03/22  0632 12/04/22  0530 12/05/22  0310   WBC 11.4 11.7* 9.1   RBC 3.14* 3.03* 2.74*   HGB 10.1* 9.8* 9.0*   HCT 31.7* 30.4* 28.0*   .0* 100.3* 102.2*   MCH 32.2 32.3 32.8   MCHC 31.9* 32.2 32.1   RDW 13.7 13.6 13.6    182 202   MPV 10.7 10.5 10.6     Recent Labs     12/03/22  0632 12/04/22  0530 12/05/22  0310    133 136   K 3.9 3.8 4.3   CL 90* 92* 94*   CO2 28 29 28   BUN 40* 21 35*   CREATININE 6.0* 3.7* 5.4*   GLUCOSE 370* 302* 192*   CALCIUM 8.3* 7.8* 8.0*   PROT 7.0 6.7 6.5   LABALBU 3.7 3.4* 3.3*   BILITOT 0.4 0.3 0.2   ALKPHOS 89 83 81   AST 8 10 11   ALT 8 7 8     Beta-Hydroxybutyrate   Date Value Ref Range Status   01/11/2022 0.46 (H) 0.02 - 0.27 mmol/L Final     Lab Results   Component Value Date/Time    LABA1C 12.7 12/03/2022 12:09 PM    LABA1C 8.9 03/02/2022 01:33 PM     Lab Results   Component Value Date/Time    TSH 1.470 12/03/2022 12:09 PM    T4FREE 1.27 12/03/2022 12:09 PM     Lab Results   Component Value Date/Time    LABA1C 12.7 12/03/2022 12:09 PM    GLUCOSE 192 12/05/2022 03:10 AM     Lab Results   Component Value Date/Time    TRIG 90 12/04/2022 05:30 AM    HDL 52 12/04/2022 05:30 AM    LDLCALC 85 12/04/2022 05:30 AM    CHOL 155 12/04/2022 05:30 AM       Blood culture   Lab Results   Component Value Date/Time    BC 24 Hours no growth 12/03/2022 11:55 AM    BC 5 Days no growth 01/12/2022 05:10 AM       Radiology:  US DUP LOWER EXTREMITIES BILATERAL VENOUS   Final Result   No evidence of DVT in either lower extremity. CTA PULMONARY W CONTRAST   Final Result   1. Pulmonary embolus seen within the segmental and subsegmental pulmonary   arteries of the medial basal segment and posterior basal segment of the right   lower lobe. 2. There are no findings of right ventricular strain   3. There is no pulmonary embolus seen within the pulmonary arteries of the   left lung   4.  Patchy airspace disease within the right lower lobe which could represent   pneumonia or possibly an early pulmonary infarct   5. Linear atelectasis seen within the left lung base   6. Small amount of pleural fluid seen within the right major fissure. CT ABDOMEN PELVIS W IV CONTRAST Additional Contrast? None   Final Result   Trace to small right pleural effusion and adjacent atelectasis at the right   lung base      No acute findings of the abdomen or pelvis. Fat containing infraumbilical   hernia measuring 2.4 cm neck of hernia without associated bowel. Medical Records/Labs/Images review:   I personally reviewed and summarized previous records   All labs and imaging were reviewed independently     [address] [facility], a 78 y.o.-old female seen today for inpatient diabetes management     Diabetes Mellitus type 2  Patient's diabetes is uncontrolled   For now, will change diabetes regimen to:  Lantus 12u nightly   Humalog 6u with meals   Low dose sliding scale   Continue glucose check with meals and at bedtime   Will titrate insulin dose based on the blood glucose trend & insulin requirement  Will arrange for patient to be seen in endocrinology clinic upon discharge for routine diabetes maintenance and prevention. ESRD  On hemodialysis  Patient is at risk for hypoglycemia while receiving insulin due to ESRD  Continue to monitor blood glucose closely    Interdisciplinary plan for communication with healthcare providers:   Consult recommendations were discussed with the Primary Service/Nursing staff      The above issues were reviewed with the patient who understood and agreed with the plan. Thank you for allowing us to participate in the care of this patient. Please do not hesitate to contact us with any additional questions.      Ryne Dinh MD  Endocrinologist, [facility] Diabetes Care and Endocrinology   [address] [place] [address]   Phone: [phone]  Fax: [phone]  --------------------------------------------  An electronic signature was used to authenticate this note.  Khang Parrish MD on 12/5/2022 at 6:45 PM

## 2022-12-05 NOTE — PROGRESS NOTES
Occupational Therapy  OCCUPATIONAL THERAPY INITIAL EVALUATION     [facility]  [facility], [address]                                                  Patient Name: Katty Currie    MRN: [id number]    : 1955    Room: [address] [place] [address]A      Evaluating OT: Antonella Guevara OTR/L [id number]      Referring Provider: Leonid Meza DO    Specific Provider Orders/Date:OT eval and treat 12/3/22      Diagnosis:  Pulmonary embolism and infarction [facility]) [I26.99]  ESRD needing dialysis ([facility] [address] [N18.6, Z99.2]  Multiple subsegmental pulmonary emboli without acute cor pulmonale ([facility] [address] [I26.94]      Pertinent Medical History: DM type 2, ESRD on hemodialysis, HTN       Precautions:  Fall Risk, O2, B cataracts      Assessment of current deficits    [x] Functional mobility  [x]ADLs  [x] Strength               []Cognition    [x] Functional transfers   [x] IADLs         [x] Safety Awareness   [x]Endurance    [] Fine Coordination              [x] Balance      [] Vision/perception   []Sensation     []Gross Motor Coordination  [] ROM  [] Delirium                   [] Motor Control     OT PLAN OF CARE   OT POC based on physician orders, patient diagnosis and results of clinical assessment    Frequency/Duration 2-4 days/wk for 2 weeks PRN   Specific OT Treatment Interventions to include:   * Instruction/training on adapted ADL techniques and AE recommendations to increase functional independence within precautions       * Training on energy conservation strategies, correct breathing pattern and techniques to improve independence/tolerance for self-care routine  * Functional transfer/mobility training/DME recommendations for increased independence, safety, and fall prevention  * Patient/Family education to increase follow through with safety techniques and functional independence  * Recommendation of environmental modifications for increased safety with functional transfers/mobility and ADLs  * Therapeutic exercise to improve motor endurance, ROM, and functional strength for ADLs/functional transfers  * Therapeutic activities to facilitate/challenge dynamic balance, stand tolerance for increased safety and independence with ADLs        Recommended Adaptive Equipment: TBD     Home Living: Pt lives with daughter in 1 story house with 1 SHIRA. Bathroom setup: tub/shower with grab bars, elevated commode   Equipment owned: wheeled walker    Prior Level of Function: independent with ADLs , assist from daughter with IADLs; functional mobility: wheeled walker    Pain Level: pt did not report pain this date; pt agreeable to therapy  Cognition: A&O: 4/4; WFL command follow demonstrated. Pt pleasant throughout session.    Memory:  Good   Sequencing:  Good   Problem solving:  Fair+   Judgement/safety:  Fair+     Functional Assessment:  AM-PAC Daily Activity Raw Score: 18/24   Initial Eval Status  Date: 12/5/22 Treatment Status  Date: STGs = LTGs  Time frame: 10-14 days   Feeding Independent      Grooming CGA  To complete hand hygiene standing at sink  Mod I/I   UB Dressing SBA  Mod I/I   LB Dressing SBA  To don shoes, seated in chair  Min A   Simulated pants    Mod I/I-with use of AD as appropriate/needed   Bathing Mod A/Min A  Mod I/I -with use of AD as appropriate/needed   Toileting Min A  For clothing management  CGA  For pericare, in standing   Mod I/I    Bed Mobility  Pt in chair   Independent    Functional Transfers Min A/CGA with wheeled walker  Sit<>stand from chair  Sit<>stand from commode  Pt with 1 LOB when sitting to commode; Min A to correct  Cues for hand placement and safe technique   Independent    Functional Mobility CGA with wheeled walker  To and from bathroom  Cues for safe wheeled walker management   Independent -with device as needed to maximize independence with ADLs and functional task completion   Balance Sitting:     Static:  Sup    Dynamic:SBA  Standing: CGA with wheeled walker  I for static/dynamic sitting balance to maximize independence with ADLs and functional task completion    I for standing balance to maximize independence with ADLs and functional task completion   Activity Tolerance Fair+ with light activity  Good with ADL activity. Pt will demonstrate good understanding of education provided on EC/WS techniques    Visual/  Perceptual Glasses: no; pt has B cataracts and reports that her vision is \"fuzzy\"                Additional long-term goal: Pt will increase functional independence to PLOF to allow pt to live in least restrictive environment. Hand Dominance R   AROM (PROM) Strength Additional Info:    RUE  WFL WFL good  and wfl FMC/dexterity noted during ADL tasks   LUE WFL WFL good  and wfl FMC/dexterity noted during ADL tasks     Hearing: WFL   Sensation:  No c/o numbness or tingling   Tone: WFL   Edema: none noted    Comments: Upon arrival patient sitting in chair with daughter present. At end of session, patient returned to chair with call light and phone within reach, all lines and tubes intact, and daughter and RN present. Overall patient demonstrated decreased independence and safety during completion of ADL/functional transfer/mobility tasks. Pt would benefit from continued skilled OT to increase safety and independence with completion of ADL/IADL tasks for functional independence and quality of life. Rehab Potential: Good for established goals     Patient / Family Goal: to return home     Patient and/or family were instructed on functional diagnosis, prognosis/goals and OT plan of care. Demonstrated good understanding.      Eval Complexity: Low    Time In: 0474  Time Out: 1052    Min Units   OT Eval Low 97165  x  1   OT Eval Medium 35745      OT Eval High 07512      OT Re-Eval [id number]       Therapeutic Ex 99222       Therapeutic Activities 00842       ADL/Self Care 70733       Orthotic Management 72658       Manual [id number]     Neuro Re-Ed 81144       Non-Billable Time          Evaluation Time additionally includes thorough review of current medical information, gathering information on past medical history/social history and prior level of function, interpretation of standardized testing/informal observation of tasks, assessment of data and development of plan of care and goals.             Benita Jasmine, OTR/L, [id number]

## 2022-12-05 NOTE — PROGRESS NOTES
[facility]  [address], [address], [address]      Pulmonary Progress Note      SUBJECTIVE:  sitting up in chair on 2 li NC, claims doing ok with breathing, ambulating some and tolerating.     OBJECTIVE    Medications    Continuous Infusions:   dextrose         Scheduled Meds:   insulin glargine  5 Units SubCUTAneous BID    insulin lispro  3 Units SubCUTAneous TID WC    apixaban  10 mg Oral BID    Followed by    Saintclair Muskrat ON 2022] apixaban  5 mg Oral BID    aspirin  81 mg Oral Daily    calcium acetate  667 mg Oral TID WC    pantoprazole  40 mg Oral QAM AC    insulin lispro  0-4 Units SubCUTAneous TID WC    insulin lispro  0-4 Units SubCUTAneous Nightly    epoetin sadia-epbx  30 Units/kg SubCUTAneous Once per day on        PRN Meds:sodium chloride flush, acetaminophen, midodrine, glucose, dextrose bolus **OR** dextrose bolus, glucagon (rDNA), dextrose, fentanNYL    Physical    VITALS:  /74   Pulse 69   Temp 98.1 °F (36.7 °C) (Oral)   Resp 20   Ht 5' 4\" (1.626 m)   Wt 257 lb 11.5 oz (116.9 kg)   SpO2 99%   BMI 44.24 kg/m²     24HR INTAKE/OUTPUT:      Intake/Output Summary (Last 24 hours) at 2022 1322  Last data filed at 2022 0800  Gross per 24 hour   Intake 240 ml   Output --   Net 240 ml         24HR PULSE OXIMETRY RANGE:    SpO2  Av.3 %  Min: 97 %  Max: 99 %    General appearance: alert, appears stated age, and cooperative, obese  Lungs: rhonchi bibasilar minimal  Heart: regular rate and rhythm, S1, S2 normal, no murmur, click, rub or gallop  Abdomen: soft, non-tender; bowel sounds normal; no masses,  no organomegaly  Extremities: edema bipedal minimal with chronic skin changes  Neurologic: Mental status: Alert, oriented, thought content appropriate    Data    CBC:   Recent Labs     22  0632 22  0530 22  0310   WBC 11.4 11.7* 9.1   HGB 10.1* 9.8* 9.0*   HCT 31.7* 30.4* 28.0* .0* 100.3* 102.2*    182 202         BMP:  Recent Labs     12/03/22  0632 12/03/22  1209 12/04/22  0530 12/05/22  0310     --  133 136   K 3.9  --  3.8 4.3   CL 90*  --  92* 94*   CO2 28  --  29 28   PHOS  --  6.7* 4.0 4.5   BUN 40*  --  21 35*   CREATININE 6.0*  --  3.7* 5.4*      ALB:3,BILIDIR:3,BILITOT:3,ALKPHOS:3)@    PT/INR: No results for input(s): PROTIME, INR in the last 72 hours. ABG:   No results for input(s): PH, PO2, PCO2, HCO3, BE, O2SAT, METHB, O2HB, COHB, O2CON, HHB, THB in the last 72 hours. Radiology/Other tests reviewed: none    Assessment:     Principal Problem:    Acute pulmonary embolism (HCC)  Active Problems:    H/O heart artery stent    Pulmonary embolism and infarction (HCC)    Diabetes mellitus ([facility] [address]    ESRD (end stage renal disease) ([facility] [address]    Essential hypertension    Hyperlipidemia    Diabetes mellitus type 2 with complications ([facility] [address]    Pulmonary hypertension, unspecified (HCC)    Obesity, Class III, BMI 40-49.9 (morbid obesity) ([facility] [address]  Resolved Problems:    * No resolved hospital problems. *      Plan:       Cont with eliquis taper and then maintenance dose  Cont with oxygen, taper as tolerated, will need to see if qualifies for home use prior to discharge  Cont with HD as per Renal  OOB to chair as tolerated  Possible discharge once above clarified      Time at the bedside, reviewing labs and radiographs, reviewing notes and consultations, discussing with staff and family was more than 35 minutes. Thanks for letting us see this patient in consultation. Please contact us with any questions. Office [phone] or after hours through [employer], [phone].

## 2022-12-05 NOTE — PROGRESS NOTES
PROGRESS  NOTE --                                                          INTERNAL  MEDICINE                                                                              I  PERSONALLY SAW , EXAMINED, AND CARED Shanique [address], 12/5/2022     LABS, XRAY ,CHART, AND MEDICATIONS  REVIEWED BY ME       Chief complaint: Flank pain on her way to dialysis, short of breath      12/4/2022-SUBJECTIVE: Jefe Bai is alert awake and cooperative; oriented ×3. Denies any dyspnea nausea emesis. Tolerating diet. No abdominal pain. Still having some pleuritic pain mostly in her back area. Currently sitting in bedside chair family present. I discussed at length current A1c of 12.7, need for better control need for endocrinology consult. I also discussed case with pulmonary, if no procedures planned, apixaban will be started earlier rather than later. Currently on heparin drip. Afebrile last 24 hours. Pulse 101. Respirations 20 blood pressure 150/86. SPO2 100 on room air. Intake and output -1400 cc. BUN 21 creatinine 3.7. Ionized calcium low at 0.99. Total calcium low at 7.8. Fasting glucose 302. CRP 29.0 slightly worse; proBNP 29,968, slightly better. LDL 85. Albumin 3.4 liver functions normal.  Hemoglobin 9.8 WBC 11.7. ESR 99. Ferritin 1986; iron slightly low at 25 iron saturation normal TIBC low at 170. A1c elevated at 12.3. Blood cultures in process. Molecular/viral respiratory panel all not detected. Pulmonary consult yesterday appreciated. Continue with IV heparin drip, switch to apixaban when stable. Last evening Tylenol not helping her flank and back pain, fentanyl was added IV as needed. Patient did have hemodialysis yesterday with net removal of 1000 cc. 2D echo has been completed, interpretation pending.     12/5/2022-patient sitting quietly in bedside chair; daughter present through the exam.  Pleuritic pains have resolved. No chest pain no dyspnea at rest.  Appetite good. Discussion regarding her 2D echo which is just resulted. EF 55%; she states its been as low as 30 in the past.  I also discussed her apixaban treatment and dosages. All questions answered. Afebrile last 24 hours. Pulse 75 blood pressure 134/54. SPO2 97 on 2 L nasal cannula. Intake and output -1160 cc. BUN 35 creatinine 5.4 magnesium 2.2 fasting glucose 192 CRP 30.4. Albumin 3.3. Hemoglobin 9.0 WBC 9.1. . CEA normal at 3.0; CA-125 normal at 19.9. Alpha-fetoprotein tumor marker normal at 1. Blood cultures negative to date; urine culture no growth incubation continues. Legionella strep antigen presumptive negative. Pulmonary note from yesterday appreciated. Ambulating and tolerating. Stop IV heparin and begin apixaban. Continue O2 taper as tolerated need to see if she qualifies for home use. Out of bed to chair. Cardiology consult from today appreciated. Patient with history of heart murmur and markedly elevated proBNP. Longstanding coronary artery disease, 2015, 3 stents placed separate occasions [facility]. Based on clinical presentation aggressive therapy to determine etiology of pulmonary embolism. Consider echocardiogram every other year to monitor progression of aortic valve disease. Pulmonary note from today appreciated. Continue apixaban 10 mg twice daily and then maintenance dose. Need to clarify if patient qualifies for home O2.  2D echo completed with following results--    Summary   Left ventricle grossly normal in size. Mild left ventricular concentric hypertrophy noted. Normal LV segmental wall motion. Estimated left ventricular ejection fraction is 58±5%. Does not meet 50% diagnostic criteria for diastolic dysfunction. The LAESV Index is <34ml/m2. Borderline dilated right ventricle. TAPSE is normal   Mild aortic stenosis is present.    Aortic valve dimensionless valve index . 41   Physiologic and/or trace tricuspid regurgitation. RVSP is 39 mmHg. Technically fair quality study. Technically difficult study due to patient''s body habitus. No comparison study available. Suggest clinical correlation. Await endocrinology consult. Obtain ambulatory pulse oximetry.     Objective:     PHYSICAL EXAM:    VS: /74   Pulse 69   Temp 98.1 °F (36.7 °C) (Oral)   Resp 20   Ht 5' 4\" (1.626 m)   Wt 257 lb 11.5 oz (116.9 kg)   SpO2 99%   BMI 44.24 kg/m²     Labs:   CBC:   Lab Results   Component Value Date/Time    WBC 9.1 12/05/2022 03:10 AM    RBC 2.74 12/05/2022 03:10 AM    HGB 9.0 12/05/2022 03:10 AM    HCT 28.0 12/05/2022 03:10 AM    .2 12/05/2022 03:10 AM    MCH 32.8 12/05/2022 03:10 AM    MCHC 32.1 12/05/2022 03:10 AM    RDW 13.6 12/05/2022 03:10 AM     12/05/2022 03:10 AM    MPV 10.6 12/05/2022 03:10 AM     CBC with Differential:    Lab Results   Component Value Date/Time    WBC 9.1 12/05/2022 03:10 AM    RBC 2.74 12/05/2022 03:10 AM    HGB 9.0 12/05/2022 03:10 AM    HCT 28.0 12/05/2022 03:10 AM     12/05/2022 03:10 AM    .2 12/05/2022 03:10 AM    MCH 32.8 12/05/2022 03:10 AM    MCHC 32.1 12/05/2022 03:10 AM    RDW 13.6 12/05/2022 03:10 AM    NRBC 0.0 01/13/2022 04:38 AM    LYMPHOPCT 12.9 12/05/2022 03:10 AM    MONOPCT 9.1 12/05/2022 03:10 AM    BASOPCT 0.4 12/05/2022 03:10 AM    MONOSABS 0.83 12/05/2022 03:10 AM    LYMPHSABS 1.18 12/05/2022 03:10 AM    EOSABS 0.33 12/05/2022 03:10 AM    BASOSABS 0.04 12/05/2022 03:10 AM     Hemoglobin/Hematocrit:    Lab Results   Component Value Date/Time    HGB 9.0 12/05/2022 03:10 AM    HCT 28.0 12/05/2022 03:10 AM     CMP:    Lab Results   Component Value Date/Time     12/05/2022 03:10 AM    K 4.3 12/05/2022 03:10 AM    K 4.2 08/23/2022 12:12 PM    CL 94 12/05/2022 03:10 AM    CO2 28 12/05/2022 03:10 AM    BUN 35 12/05/2022 03:10 AM    CREATININE 5.4 12/05/2022 03:10 AM    GFRAA 8 09/22/2022 12:14 PM    LABGLOM 8 12/05/2022 03:10 AM    GLUCOSE 192 12/05/2022 03:10 AM    PROT 6.5 12/05/2022 03:10 AM    LABALBU 3.3 12/05/2022 03:10 AM    CALCIUM 8.0 12/05/2022 03:10 AM    BILITOT 0.2 12/05/2022 03:10 AM    ALKPHOS 81 12/05/2022 03:10 AM    AST 11 12/05/2022 03:10 AM    ALT 8 12/05/2022 03:10 AM     BMP:    Lab Results   Component Value Date/Time     12/05/2022 03:10 AM    K 4.3 12/05/2022 03:10 AM    K 4.2 08/23/2022 12:12 PM    CL 94 12/05/2022 03:10 AM    CO2 28 12/05/2022 03:10 AM    BUN 35 12/05/2022 03:10 AM    LABALBU 3.3 12/05/2022 03:10 AM    CREATININE 5.4 12/05/2022 03:10 AM    CALCIUM 8.0 12/05/2022 03:10 AM    GFRAA 8 09/22/2022 12:14 PM    LABGLOM 8 12/05/2022 03:10 AM    GLUCOSE 192 12/05/2022 03:10 AM     Hepatic Function Panel:    Lab Results   Component Value Date/Time    ALKPHOS 81 12/05/2022 03:10 AM    ALT 8 12/05/2022 03:10 AM    AST 11 12/05/2022 03:10 AM    PROT 6.5 12/05/2022 03:10 AM    BILITOT 0.2 12/05/2022 03:10 AM    BILIDIR <0.2 12/05/2022 03:10 AM    IBILI see below 12/05/2022 03:10 AM    LABALBU 3.3 12/05/2022 03:10 AM     Ionized Calcium:  No results found for: IONCA  Magnesium:    Lab Results   Component Value Date/Time    MG 2.2 12/05/2022 03:10 AM     Phosphorus:    Lab Results   Component Value Date/Time    PHOS 4.5 12/05/2022 03:10 AM     LDH:    Lab Results   Component Value Date/Time     01/11/2022 12:45 PM     Uric Acid:    Lab Results   Component Value Date/Time    LABURIC 3.3 12/04/2022 05:30 AM     PT/INR:    Lab Results   Component Value Date/Time    PROTIME 11.0 08/23/2022 12:12 PM    INR 1.0 08/23/2022 12:12 PM     Warfarin PT/INR:  No components found for: PTPATWAR, PTINRWAR  PTT:    Lab Results   Component Value Date/Time    APTT 136.2 12/04/2022 09:18 AM   [APTT}  Troponin:  No results found for: TROPONINI  Last 3 Troponin:  No results found for: TROPONINI  U/A:    Lab Results   Component Value Date/Time    COLORU Yellow 11/18/2021 03:19 AM    PROTEINU >=300 11/18/2021 03:19 AM    PHUR 6.0 11/18/2021 03:19 AM    WBCUA 1-3 11/18/2021 03:19 AM    RBCUA 0-1 11/18/2021 03:19 AM    BACTERIA FEW 11/18/2021 03:19 AM    CLARITYU Clear 11/18/2021 03:19 AM    SPECGRAV 1.020 11/18/2021 03:19 AM    LEUKOCYTESUR Negative 11/18/2021 03:19 AM    UROBILINOGEN 0.2 11/18/2021 03:19 AM    BILIRUBINUR Negative 11/18/2021 03:19 AM    BLOODU SMALL 11/18/2021 03:19 AM    GLUCOSEU 500 11/18/2021 03:19 AM     ABG:  No results found for: PH, PCO2, PO2, HCO3, BE, THGB, TCO2, O2SAT  HgBA1c:    Lab Results   Component Value Date/Time    LABA1C 12.7 12/03/2022 12:09 PM     FLP:    Lab Results   Component Value Date/Time    TRIG 90 12/04/2022 05:30 AM    HDL 52 12/04/2022 05:30 AM    LDLCALC 85 12/04/2022 05:30 AM    LABVLDL 18 12/04/2022 05:30 AM     TSH:    Lab Results   Component Value Date/Time    TSH 1.470 12/03/2022 12:09 PM     VITAMIN B12: No components found for: B12  FOLATE:  No results found for: FOLATE  IRON:    Lab Results   Component Value Date/Time    IRON 25 12/04/2022 05:30 AM     Iron Saturation:  No components found for: PERCENTFE  TIBC:    Lab Results   Component Value Date/Time    TIBC 170 12/04/2022 05:30 AM     FERRITIN:    Lab Results   Component Value Date/Time    FERRITIN 1,986 12/04/2022 05:30 AM        General appearance: Alert, Awake, Oriented times 3, no distress; morbidly obese  Skin: Warm and dry ; no rashes  Head: Normocephalic. No masses, lesions or tenderness noted  Eyes: Conjunctivae pale, sclera white. PERRL,EOM-I  Ears: External ears normal  Nose/Sinuses: Nares normal. Septum midline. Mucosa normal. No drainage  Oropharynx: Oropharynx clear with no exudate seen  Neck: Supple. No jugular venous distension, lymphadenopathy or thyromegaly Trachea midline  Lungs: Minimal bibasilar rhonchi  Heart: S1 S2  Regular rate and rhythm. No rub, gallop, murmur  Abdomen: Soft, non-tender.  BS normal. No masses, organomegaly; no rebound or guarding  Extremities: Chronic 2-3+ bilateral edema, she states since starting dialysis  Musculoskeletal: Muscular strength appears intact. Neuro:  No focal motor defects ; II-XII grossly intact . MORRIS equally    TELEMETRY: REVIEWED--Telemetry: Sinus and occasional PVCs    ASSESSMENT:   Principal Problem:    Acute pulmonary embolism (HCC)  Active Problems:    H/O heart artery stent    Pulmonary embolism and infarction ([facility])    Diabetes mellitus ([facility]    ESRD (end stage renal disease) ([facility]    Essential hypertension    Hyperlipidemia    Diabetes mellitus type 2 with complications ([facility]    Pulmonary hypertension, unspecified (HCC)    Obesity, Class III, BMI 40-49.9 (morbid obesity) ([facility]  Resolved Problems:    * No resolved hospital problems. *      PLAN:  SEE ORDERS      RE  CHANGES AND FINDINGS   Medications reviewed with patient  GI prophylaxis  DVT prophylaxis  Consultants notes reviewed    Aspirin 81 mg daily  PhosLo 667, 1 capsule 3 times daily  Low-dose sliding scale insulin  Protonix 40 mg daily  Heparin drip IV  Acetaminophen 650 mg every 8 hours as needed for pain  Fentanyl 50 mcg IV every 6 hours as needed for pain  Endocrinology consult regarding poorly controlled diabetes; patient had stopped metformin and was controlling her glucose with \"diet\". Conversion to apixaban when okay with pulmonary  12/5/2022  Heparin drip has been discontinued  Apixaban 10 mg twice daily x7 days then 5 mg twice daily  Ambulatory pulse oximetry  Patient refused atorvastatin  Lantus 5 units subcu twice daily  Low-dose sliding scale insulin  Humalog 3 units 3 times daily with meals  Await endocrinology consult  Hemodialysis in a.m. Discussed with patient and family and nursing. [address]     2:58 PM     12/5/2022    TIME > 35 MINUTES    >  50 %  OF  TIME  DISCUSSION               ------------  INFORMATION  -----------      DIET:ADULT DIET; Regular; 4 carb choices (60 gm/meal);  Low Fat/Low Chol/High Fiber/CHELSEA        Allergies   Allergen Reactions    Benzonatate Other (See Comments)     Patient states \"it was like I was drunk. \"    Morphine Itching    Pcn [Penicillins] Rash    Sodium Hypochlorite Nausea And Vomiting and Rash     AKA BLEACH. RASH FROM SKIN EXPOSURE          MEDICATION SIDE EFFECTS:none       SCHEDULED MEDS:                                 Scheduled Meds:   insulin glargine  5 Units SubCUTAneous BID    insulin lispro  3 Units SubCUTAneous TID WC    apixaban  10 mg Oral BID    Followed by    Jena Mo ON 12/11/2022] apixaban  5 mg Oral BID    aspirin  81 mg Oral Daily    calcium acetate  667 mg Oral TID WC    pantoprazole  40 mg Oral QAM AC    insulin lispro  0-4 Units SubCUTAneous TID WC    insulin lispro  0-4 Units SubCUTAneous Nightly    epoetin sadia-epbx  30 Units/kg SubCUTAneous Once per day on Mon Wed Fri       Continuous Infusions:   dextrose           Data:       Intake/Output Summary (Last 24 hours) at 12/5/2022 1458  Last data filed at 12/5/2022 0800  Gross per 24 hour   Intake 240 ml   Output --   Net 240 ml       Wt Readings from Last 3 Encounters:   12/03/22 257 lb 11.5 oz (116.9 kg)   10/03/22 260 lb (117.9 kg)   09/22/22 260 lb (117.9 kg)       Labs: Additional    GLUCOSE:No results for input(s): POCGLU in the last 72 hours. BNP:No results found for: BNP    CRP:   Recent Labs     12/03/22  1209 12/04/22  0530 12/05/22  0310   CRP 23.8* 29.0* 30.4*       ESR:  Recent Labs     12/04/22  0530 12/05/22  0310   SEDRATE 99* 124*       RADIOLOGY: REVIEWED AVAILABLE REPORT  US DUP LOWER EXTREMITIES BILATERAL VENOUS   Final Result   No evidence of DVT in either lower extremity. CTA PULMONARY W CONTRAST   Final Result   1. Pulmonary embolus seen within the segmental and subsegmental pulmonary   arteries of the medial basal segment and posterior basal segment of the right   lower lobe. 2. There are no findings of right ventricular strain   3.  There is no pulmonary embolus seen within the pulmonary arteries of the   left lung   4. Patchy airspace disease within the right lower lobe which could represent   pneumonia or possibly an early pulmonary infarct   5. Linear atelectasis seen within the left lung base   6. Small amount of pleural fluid seen within the right major fissure. CT ABDOMEN PELVIS W IV CONTRAST Additional Contrast? None   Final Result   Trace to small right pleural effusion and adjacent atelectasis at the right   lung base      No acute findings of the abdomen or pelvis. Fat containing infraumbilical   hernia measuring 2.4 cm neck of hernia without associated bowel.                    [address],     2:58 PM     12/5/2022      Voice recognition software used for dictation

## 2022-12-05 NOTE — CARE COORDINATION
12/5/2022  Social Work Discharge Planning:Pt is independent  from home with her daughter. CM gave Pt a free 30 day eliquis card. Pt is currently on 2l o2 . Pulse ox testing is needed. Wean o2.   Electronically signed by HILDA Fraire Res on 12/5/2022 at 11:20 AM

## 2022-12-05 NOTE — CONSULTS
CARDIOLOGY CONSULTATION    Patient Name:  Emily Mcdonald    :  1955    Reason for Consultation:   Elevated proBNP; heart murmur    History of Present Illness:   Emily Mcdonald presents to [facility] following history of recent onset of severe pleuritic right paravertebral chest discomfort which increased in movement and in breathing. She finished her dialysis and came to the emergency room for further evaluation. She was found to have a pulmonary embolism. Upon further questioning I been asked to see her as she was found to have a heart murmur and markedly elevated proBNP. She does have a longstanding history of coronary artery disease dating back to  when she had 3 stents placed in separate occasions in the [facility]. He has had no chest discomfort since and did undergo a follow-up nuclear stress test which according to the patient did not demonstrate any abnormalities. Upon further questioning she denies any exertional chest discomfort prior to this episode which was not associated with exertion but occurred at rest and she has mild dyspnea with exertion but no palpitations lightheadedness nor claudication.   Until recently she was unaware of the presence of a heart murmur    Past Medical History:   has a past medical history of Acute pulmonary embolism ([address] Anemia in CKD (chronic kidney disease), Anxiety and depression, At high risk for falls, Brain aneurysm, CLABSI (central line-associated bloodstream infection), Cough, Diabetes mellitus ([address] Diabetes mellitus type 2 with complications ([address] Dizziness on standing, ESRD (end stage renal disease) ([address] ESRD on hemodialysis ([address] Essential hypertension, Fever, unspecified, Gastrointestinal hemorrhage, H/O heart artery stent, History of Clostridioides difficile colitis, Hyperlipidemia, Hypertension, MSSA bacteremia, Nausea & vomiting, Obesity, Class III, BMI 40-49.9 (morbid obesity) ([address] and Periumbilical abdominal pain. Surgical History:   has a past surgical history that includes Cardiac surgery;  section; vascular surgery (N/A, 2021); Upper gastrointestinal endoscopy (N/A, 2021); Dialysis fistula creation (Left, 2022); Colonoscopy (2017); Cardiac catheterization (); and Percutaneous Transluminal Coronary Angio (). Social History:   reports that she quit smoking about 36 years ago. Her smoking use included cigarettes. She started smoking about 48 years ago. She has a 12.00 pack-year smoking history. She has never used smokeless tobacco. She reports that she does not drink alcohol and does not use drugs. Family History:  family history includes Coronary Art Dis in her brother, father, and mother. Mother  coronary artery disease Father  secondary to CVA    Medications:  Prior to Admission medications    Medication Sig Start Date End Date Taking? Authorizing Provider   aspirin 81 MG EC tablet Take 81 mg by mouth daily    Historical Provider, MD   acetaminophen (TYLENOL) 650 MG extended release tablet Take 650 mg by mouth every 8 hours as needed for Pain    Historical Provider, MD   midodrine (PROAMATINE) 10 MG tablet Take 10 mg by mouth daily as needed (AT DIALYSIS FOR LOW BP) 21   Historical Provider, MD   calcium acetate (PHOSLO) 667 MG CAPS capsule Take 667-2,001 mg by mouth 3 times daily (with meals) 21   Historical Provider, MD       Allergies:  Benzonatate, Morphine, Pcn [penicillins], and Sodium hypochlorite     Review of Systems:   Constitutional: there has been no unanticipated weight loss. There's been no significant change in energy or activity level, nor sleep pattern . No fever chills or rigors. Eyes: No visual changes or diplopia. No scleral icterus. ENT: No Headaches, hearing loss or vertigo. No mouth sores or sore throat. No change in taste or smell.   Cardiovascular: Pleuritic-like chest discomfort, mild dyspnea on exertion, no palpitations, loss of consciousness, no phlebitis, no claudication. Respiratory: No cough or wheezing, no sputum production. No hemoptysis, pleuritic pain. Gastrointestinal: No abdominal pain, appetite loss, blood in stools. No change in bowel habits. No hematemesis  Genitourinary: End-stage renal disease-chronic dialysis  Musculoskeletal:  No gait disturbance, weakness or joint complaints. Integumentary: No rash or pruritis. Neurological: No headache, diplopia, change in muscle strength, numbness or tingling. No change in gait, balance, coordination, mood, affect, memory, mentation, behavior. Psychiatric: No anxiety or depression. Endocrine: No temperature intolerance. No excessive thirst, fluid intake, or urination. No tremor. Hematologic/Lymphatic: No abnormal bruising or bleeding, blood clots or swollen lymph nodes. Allergic/Immunologic: No nasal congestion or hives. Physical Examination:    Vital Signs: /74   Pulse 69   Temp 98.1 °F (36.7 °C) (Oral)   Resp 20   Ht 5' 4\" (1.626 m)   Wt 257 lb 11.5 oz (116.9 kg)   SpO2 99%   BMI 44.24 kg/m²   General appearance: Well preserved, mesomorphic body habitus, alert, no distress. Skin: Skin color, texture, turgor normal. No rashes or lesions. No induration or tightening palpated. Head: Normocephalic. No masses, lesions, tenderness or abnormalities  Eyes: Conjunctivae/corneas clear. PERRL, EOMs intact. Sclera non icteric. Ears: External ears normal. Canals clear. TM's clear bilaterally. Hearing normal to finger rub. Nose/Sinuses: Nares normal. Septum midline. Mucosa normal. No drainage or sinus tenderness. Oropharynx: Lips, mucosa, and tongue normal. Oropharynx clear with no exudate seen. Neck: Neck supple and symmetric. No adenopathy. Thyroid symmetric, normal size, without nodules. Trachea is midline. Carotids brisk in upstroke without bruits, no abnormal JVP noted at 45°.   Chest: Even excursion  Lungs: Lungs grossly clear to auscultation bilaterally. No retractions or use of accessory muscles. No tactile vocal fremitus. No rhonchi, crackles or rales. Heart:  S1 > S2. Regular rhythm. No gallop grade 2-3 systolic ejection murmur second right and left intercostal space no rub, palpable thrill or heave noted. PMI 5th intercostal space midclavicular line. Abdomen: Abdomen soft, grossly protuberant, non-tender. BS normal. No masses, organomegaly. No hernia noted. Extremities: Extremities normal. No deformities, edema, or skin discoloration. No cyanosis or clubbing noted to the nails. Peripheral pulses present 2+ upper extremities and present 1+  lower extremities. Musculoskeletal: Spine ROM normal. Muscular strength intact. Neuro: Cranial nerves intact. Motor: Strength 5/5 in all extremities. Reflexes 2+ in all extremities. No focal weakness. Sensory: grossly normal to touch. Coordination intact. Pertinent Labs:  CBC:   Recent Labs     12/03/22  0632 12/04/22  0530 12/05/22  0310   WBC 11.4 11.7* 9.1   HGB 10.1* 9.8* 9.0*    182 202     BMP:  Recent Labs     12/03/22  0632 12/04/22  0530 12/05/22  0310    133 136   K 3.9 3.8 4.3   CL 90* 92* 94*   CO2 28 29 28   BUN 40* 21 35*   CREATININE 6.0* 3.7* 5.4*   GLUCOSE 370* 302* 192*   LABGLOM 7 13 8     ABGs: No results found for: PH, PO2, PCO2  INR: No results for input(s): INR in the last 72 hours.   PRO-BNP:   Lab Results   Component Value Date    PROBNP 29,968 (H) 12/04/2022    PROBNP 31,643 (H) 12/03/2022      Cardiac Injury Profile:   Recent Labs     12/03/22  0632 12/03/22  0805   TROPHS 76* 73*      Lipid Profile:   Lab Results   Component Value Date/Time    TRIG 90 12/04/2022 05:30 AM    HDL 52 12/04/2022 05:30 AM    LDLCALC 85 12/04/2022 05:30 AM    CHOL 155 12/04/2022 05:30 AM      Thyroid:   Lab Results   Component Value Date    TSH 1.470 12/03/2022      Hemoglobin A1C: No components found for: HGBA1C   ECG:  See report  ECHO: 12/4/2022  Summary  Left ventricle grossly normal in size. Mild left ventricular concentric hypertrophy noted. Normal LV segmental wall motion. Estimated left ventricular ejection fraction is 58±5%. Does not meet 50% diagnostic criteria for diastolic dysfunction. The LAESV Index is <34ml/m2. Borderline dilated right ventricle. TAPSE is normal  Mild aortic stenosis is present. Aortic valve dimensionless valve index . 41  Physiologic and/or trace tricuspid regurgitation. RVSP is 39 mmHg. Technically fair quality study. Technically difficult study due to patient''s body habitus. No comparison study available. Radiology:  US DUP LOWER EXTREMITIES BILATERAL VENOUS   Final Result   No evidence of DVT in either lower extremity. CTA PULMONARY W CONTRAST   Final Result   1. Pulmonary embolus seen within the segmental and subsegmental pulmonary   arteries of the medial basal segment and posterior basal segment of the right   lower lobe. 2. There are no findings of right ventricular strain   3. There is no pulmonary embolus seen within the pulmonary arteries of the   left lung   4. Patchy airspace disease within the right lower lobe which could represent   pneumonia or possibly an early pulmonary infarct   5. Linear atelectasis seen within the left lung base   6. Small amount of pleural fluid seen within the right major fissure. CT ABDOMEN PELVIS W IV CONTRAST Additional Contrast? None   Final Result   Trace to small right pleural effusion and adjacent atelectasis at the right   lung base      No acute findings of the abdomen or pelvis. Fat containing infraumbilical   hernia measuring 2.4 cm neck of hernia without associated bowel.            Assessment:    Patient Active Problem List   Diagnosis Code    Nausea & vomiting R11.2    MSSA bacteremia R78.81, B95.61    CLABSI (central line-associated bloodstream infection) T80.211A    ESRD (end stage renal disease) ([facility] N18.6    Fever, unspecified R50.9    Essential hypertension I10    Hyperlipidemia E78.5    Diabetes mellitus type 2 with complications (HCC) K44.5    Neck pain M54.2    Anemia in CKD (chronic kidney disease) N18.9, D63.1    Pneumonia due to COVID-19 virus U07.1, J12.82    Pulmonary hypertension, unspecified (HCC) I27.20    Obesity, Class III, BMI 40-49.9 (morbid obesity) ([facility] [address] E66.01    History of Clostridioides difficile colitis Z86.19    Anxiety and depression F41.9, F32. A    At high risk for falls Z91.81    Dizziness on standing O04    Periumbilical abdominal pain R10.33    Encounter regarding vascular access for dialysis for end-stage renal disease ([facility] [address] N18.6, Z99.2    Acute pulmonary embolism (HCC) I26.99    H/O heart artery stent Z95.5    Pulmonary embolism and infarction (HCC) I26.99    Diabetes mellitus ([facility] [address] E11.9       Plan:  Based upon the patient's clinical presentation certainly aggressive therapy and attempt to determine the etiology of her pulmonary embolism is warranted. From a cardiac standpoint she has established coronary artery disease with successful history of stent appointments it is of utmost importance that she maintain her LDL cholesterol at < 55 mg/dL in accordance with updated 2020 ACC/AHA/AACE/ESC/EAS cholesterol guidelines. Additionally, I would consider an echocardiogram every other year to monitor the progression of her aortic valve disease. I have spent more than 55 minutes face to face with Bill Zhao reviewing notes and laboratory data with greater than 50% of this time instructing and counseling the patient and her daughter regarding my findings and recommendations and I have answered all questions as posed to me by Ms. Rafaela Torres and her daughter. Thank you, Lew Martinez DO, MD for allowing me to consult in the care of this patient. Jacques Sheldon DO , VINODP, [facility] [place], [place], [facility]    NOTE:  This report was transcribed using voice recognition software.   Every effort was made to ensure accuracy; however, inadvertent computerized transcription errors may be present.

## 2022-12-05 NOTE — PLAN OF CARE
Problem: ABCDS Injury Assessment  Goal: Absence of physical injury  Outcome: Progressing     Problem: Safety - Adult  Goal: Free from fall injury  Outcome: Progressing     Problem: Pain  Goal: Verbalizes/displays adequate comfort level or baseline comfort level  Outcome: Progressing     Problem: Chronic Conditions and Co-morbidities  Goal: Patient's chronic conditions and co-morbidity symptoms are monitored and maintained or improved  Outcome: Progressing

## 2022-12-06 ENCOUNTER — APPOINTMENT (OUTPATIENT)
Dept: GENERAL RADIOLOGY | Age: 67
DRG: 175 | End: 2022-12-06
Payer: MEDICARE

## 2022-12-06 VITALS
BODY MASS INDEX: 44.41 KG/M2 | OXYGEN SATURATION: 98 % | WEIGHT: 260.14 LBS | TEMPERATURE: 97.7 F | RESPIRATION RATE: 18 BRPM | SYSTOLIC BLOOD PRESSURE: 148 MMHG | HEIGHT: 64 IN | HEART RATE: 76 BPM | DIASTOLIC BLOOD PRESSURE: 72 MMHG

## 2022-12-06 LAB
ALBUMIN SERPL-MCNC: 3.3 G/DL (ref 3.5–5.2)
ALP BLD-CCNC: 87 U/L (ref 35–104)
ALT SERPL-CCNC: 8 U/L (ref 0–32)
ANION GAP SERPL CALCULATED.3IONS-SCNC: 15 MMOL/L (ref 7–16)
AST SERPL-CCNC: 9 U/L (ref 0–31)
BASOPHILS ABSOLUTE: 0.04 E9/L (ref 0–0.2)
BASOPHILS RELATIVE PERCENT: 0.4 % (ref 0–2)
BILIRUB SERPL-MCNC: 0.2 MG/DL (ref 0–1.2)
BILIRUBIN DIRECT: <0.2 MG/DL (ref 0–0.3)
BILIRUBIN, INDIRECT: ABNORMAL MG/DL (ref 0–1)
BUN BLDV-MCNC: 48 MG/DL (ref 6–23)
C-REACTIVE PROTEIN: 19.9 MG/DL (ref 0–0.4)
CALCIUM SERPL-MCNC: 7.9 MG/DL (ref 8.6–10.2)
CHLORIDE BLD-SCNC: 92 MMOL/L (ref 98–107)
CO2: 26 MMOL/L (ref 22–29)
CREAT SERPL-MCNC: 6.9 MG/DL (ref 0.5–1)
EOSINOPHILS ABSOLUTE: 0.43 E9/L (ref 0.05–0.5)
EOSINOPHILS RELATIVE PERCENT: 4.5 % (ref 0–6)
GFR SERPL CREATININE-BSD FRML MDRD: 6 ML/MIN/1.73
GLUCOSE BLD-MCNC: 335 MG/DL (ref 74–99)
HCT VFR BLD CALC: 27.6 % (ref 34–48)
HEMOGLOBIN: 8.8 G/DL (ref 11.5–15.5)
IMMATURE GRANULOCYTES #: 0.03 E9/L
IMMATURE GRANULOCYTES %: 0.3 % (ref 0–5)
LACTIC ACID: 1.2 MMOL/L (ref 0.5–2.2)
LYMPHOCYTES ABSOLUTE: 1.02 E9/L (ref 1.5–4)
LYMPHOCYTES RELATIVE PERCENT: 10.7 % (ref 20–42)
MAGNESIUM: 2.2 MG/DL (ref 1.6–2.6)
MCH RBC QN AUTO: 32.1 PG (ref 26–35)
MCHC RBC AUTO-ENTMCNC: 31.9 % (ref 32–34.5)
MCV RBC AUTO: 100.7 FL (ref 80–99.9)
METER GLUCOSE: 286 MG/DL (ref 74–99)
METER GLUCOSE: 332 MG/DL (ref 74–99)
MONOCYTES ABSOLUTE: 0.77 E9/L (ref 0.1–0.95)
MONOCYTES RELATIVE PERCENT: 8.1 % (ref 2–12)
NEUTROPHILS ABSOLUTE: 7.24 E9/L (ref 1.8–7.3)
NEUTROPHILS RELATIVE PERCENT: 76 % (ref 43–80)
OCCULT BLOOD SCREENING: NORMAL
PDW BLD-RTO: 13.6 FL (ref 11.5–15)
PHOSPHORUS: 5.5 MG/DL (ref 2.5–4.5)
PLATELET # BLD: 212 E9/L (ref 130–450)
PMV BLD AUTO: 10 FL (ref 7–12)
POTASSIUM SERPL-SCNC: 4.9 MMOL/L (ref 3.5–5)
RBC # BLD: 2.74 E12/L (ref 3.5–5.5)
SEDIMENTATION RATE, ERYTHROCYTE: 121 MM/HR (ref 0–20)
SODIUM BLD-SCNC: 133 MMOL/L (ref 132–146)
TOTAL PROTEIN: 6.4 G/DL (ref 6.4–8.3)
URINE CULTURE, ROUTINE: NORMAL
WBC # BLD: 9.5 E9/L (ref 4.5–11.5)

## 2022-12-06 PROCEDURE — 85025 COMPLETE CBC W/AUTO DIFF WBC: CPT

## 2022-12-06 PROCEDURE — 83735 ASSAY OF MAGNESIUM: CPT

## 2022-12-06 PROCEDURE — 71045 X-RAY EXAM CHEST 1 VIEW: CPT

## 2022-12-06 PROCEDURE — 82270 OCCULT BLOOD FECES: CPT

## 2022-12-06 PROCEDURE — 85651 RBC SED RATE NONAUTOMATED: CPT

## 2022-12-06 PROCEDURE — 6360000002 HC RX W HCPCS: Performed by: INTERNAL MEDICINE

## 2022-12-06 PROCEDURE — 2500000003 HC RX 250 WO HCPCS: Performed by: INTERNAL MEDICINE

## 2022-12-06 PROCEDURE — 36415 COLL VENOUS BLD VENIPUNCTURE: CPT

## 2022-12-06 PROCEDURE — 80048 BASIC METABOLIC PNL TOTAL CA: CPT

## 2022-12-06 PROCEDURE — 86140 C-REACTIVE PROTEIN: CPT

## 2022-12-06 PROCEDURE — 82962 GLUCOSE BLOOD TEST: CPT

## 2022-12-06 PROCEDURE — 80076 HEPATIC FUNCTION PANEL: CPT

## 2022-12-06 PROCEDURE — 6370000000 HC RX 637 (ALT 250 FOR IP): Performed by: INTERNAL MEDICINE

## 2022-12-06 PROCEDURE — 83605 ASSAY OF LACTIC ACID: CPT

## 2022-12-06 PROCEDURE — 90935 HEMODIALYSIS ONE EVALUATION: CPT

## 2022-12-06 PROCEDURE — 84100 ASSAY OF PHOSPHORUS: CPT

## 2022-12-06 PROCEDURE — 2700000000 HC OXYGEN THERAPY PER DAY

## 2022-12-06 PROCEDURE — 97161 PT EVAL LOW COMPLEX 20 MIN: CPT

## 2022-12-06 RX ORDER — GLUCOSAMINE HCL/CHONDROITIN SU 500-400 MG
CAPSULE ORAL
Qty: 250 STRIP | Refills: 5 | Status: SHIPPED | OUTPATIENT
Start: 2022-12-06

## 2022-12-06 RX ORDER — INSULIN GLARGINE 100 [IU]/ML
24 INJECTION, SOLUTION SUBCUTANEOUS NIGHTLY
Qty: 10 ADJUSTABLE DOSE PRE-FILLED PEN SYRINGE | Refills: 3 | Status: SHIPPED | OUTPATIENT
Start: 2022-12-06

## 2022-12-06 RX ORDER — PEN NEEDLE, DIABETIC 32 GX 1/4"
NEEDLE, DISPOSABLE MISCELLANEOUS
Qty: 250 EACH | Refills: 5 | Status: SHIPPED | OUTPATIENT
Start: 2022-12-06

## 2022-12-06 RX ORDER — INSULIN LISPRO 100 [IU]/ML
INJECTION, SOLUTION INTRAVENOUS; SUBCUTANEOUS
Qty: 10 ADJUSTABLE DOSE PRE-FILLED PEN SYRINGE | Refills: 4 | Status: SHIPPED | OUTPATIENT
Start: 2022-12-06

## 2022-12-06 RX ORDER — ROSUVASTATIN CALCIUM 5 MG/1
5 TABLET, COATED ORAL
Status: DISCONTINUED | OUTPATIENT
Start: 2022-12-07 | End: 2022-12-06 | Stop reason: HOSPADM

## 2022-12-06 RX ORDER — ROSUVASTATIN CALCIUM 5 MG/1
5 TABLET, COATED ORAL DAILY
Qty: 30 TABLET | Refills: 3 | Status: SHIPPED | OUTPATIENT
Start: 2022-12-06

## 2022-12-06 RX ORDER — LANCETS
EACH MISCELLANEOUS
Qty: 250 EACH | Refills: 5 | Status: SHIPPED | OUTPATIENT
Start: 2022-12-06

## 2022-12-06 RX ORDER — INSULIN GLARGINE 100 [IU]/ML
12 INJECTION, SOLUTION SUBCUTANEOUS 2 TIMES DAILY
Status: DISCONTINUED | OUTPATIENT
Start: 2022-12-06 | End: 2022-12-06

## 2022-12-06 RX ORDER — DIPHENHYDRAMINE HYDROCHLORIDE 50 MG/ML
25 INJECTION INTRAMUSCULAR; INTRAVENOUS DAILY PRN
Status: DISCONTINUED | OUTPATIENT
Start: 2022-12-06 | End: 2022-12-06 | Stop reason: HOSPADM

## 2022-12-06 RX ORDER — INSULIN GLARGINE 100 [IU]/ML
24 INJECTION, SOLUTION SUBCUTANEOUS 2 TIMES DAILY
Status: DISCONTINUED | OUTPATIENT
Start: 2022-12-06 | End: 2022-12-06 | Stop reason: HOSPADM

## 2022-12-06 RX ADMIN — INSULIN LISPRO 4 UNITS: 100 INJECTION, SOLUTION INTRAVENOUS; SUBCUTANEOUS at 16:14

## 2022-12-06 RX ADMIN — CALCIUM ACETATE 667 MG: 667 CAPSULE ORAL at 16:12

## 2022-12-06 RX ADMIN — INSULIN GLARGINE 24 UNITS: 100 INJECTION, SOLUTION SUBCUTANEOUS at 18:00

## 2022-12-06 RX ADMIN — INSULIN LISPRO 6 UNITS: 100 INJECTION, SOLUTION INTRAVENOUS; SUBCUTANEOUS at 16:16

## 2022-12-06 RX ADMIN — INSULIN LISPRO 3 UNITS: 100 INJECTION, SOLUTION INTRAVENOUS; SUBCUTANEOUS at 06:31

## 2022-12-06 RX ADMIN — SALINE NASAL SPRAY 1 SPRAY: 1.5 SOLUTION NASAL at 16:12

## 2022-12-06 RX ADMIN — DIPHENHYDRAMINE HYDROCHLORIDE 25 MG: 50 INJECTION, SOLUTION INTRAMUSCULAR; INTRAVENOUS at 10:32

## 2022-12-06 RX ADMIN — INSULIN LISPRO 6 UNITS: 100 INJECTION, SOLUTION INTRAVENOUS; SUBCUTANEOUS at 06:30

## 2022-12-06 RX ADMIN — PANTOPRAZOLE SODIUM 40 MG: 40 TABLET, DELAYED RELEASE ORAL at 05:00

## 2022-12-06 ASSESSMENT — PAIN SCALES - GENERAL
PAINLEVEL_OUTOF10: 0
PAINLEVEL_OUTOF10: 0

## 2022-12-06 NOTE — PROGRESS NOTES
PROGRESS  NOTE --                                                          INTERNAL  MEDICINE                                                                              I  PERSONALLY SAW , EXAMINED, AND CARED Shanique [address], 12/6/2022     LABS, XRAY ,CHART, AND MEDICATIONS  REVIEWED BY ME       Chief complaint: Flank pain on her way to dialysis, short of breath      12/4/2022-SUBJECTIVE: Crystal Luna is alert awake and cooperative; oriented ×3. Denies any dyspnea nausea emesis. Tolerating diet. No abdominal pain. Still having some pleuritic pain mostly in her back area. Currently sitting in bedside chair family present. I discussed at length current A1c of 12.7, need for better control need for endocrinology consult. I also discussed case with pulmonary, if no procedures planned, apixaban will be started earlier rather than later. Currently on heparin drip. Afebrile last 24 hours. Pulse 101. Respirations 20 blood pressure 150/86. SPO2 100 on room air. Intake and output -1400 cc. BUN 21 creatinine 3.7. Ionized calcium low at 0.99. Total calcium low at 7.8. Fasting glucose 302. CRP 29.0 slightly worse; proBNP 29,968, slightly better. LDL 85. Albumin 3.4 liver functions normal.  Hemoglobin 9.8 WBC 11.7. ESR 99. Ferritin 1986; iron slightly low at 25 iron saturation normal TIBC low at 170. A1c elevated at 12.3. Blood cultures in process. Molecular/viral respiratory panel all not detected. Pulmonary consult yesterday appreciated. Continue with IV heparin drip, switch to apixaban when stable. Last evening Tylenol not helping her flank and back pain, fentanyl was added IV as needed. Patient did have hemodialysis yesterday with net removal of 1000 cc. 2D echo has been completed, interpretation pending.     12/5/2022-patient sitting quietly in bedside chair; daughter present through the exam.  Pleuritic pains have resolved. No chest pain no dyspnea at rest.  Appetite good. Discussion regarding her 2D echo which is just resulted. EF 55%; she states its been as low as 30 in the past.  I also discussed her apixaban treatment and dosages. All questions answered. Afebrile last 24 hours. Pulse 75 blood pressure 134/54. SPO2 97 on 2 L nasal cannula. Intake and output -1160 cc. BUN 35 creatinine 5.4 magnesium 2.2 fasting glucose 192 CRP 30.4. Albumin 3.3. Hemoglobin 9.0 WBC 9.1. . CEA normal at 3.0; CA-125 normal at 19.9. Alpha-fetoprotein tumor marker normal at 1. Blood cultures negative to date; urine culture no growth incubation continues. Legionella strep antigen presumptive negative. Pulmonary note from yesterday appreciated. Ambulating and tolerating. Stop IV heparin and begin apixaban. Continue O2 taper as tolerated need to see if she qualifies for home use. Out of bed to chair. Cardiology consult from today appreciated. Patient with history of heart murmur and markedly elevated proBNP. Longstanding coronary artery disease, 2015, 3 stents placed separate occasions [facility]. Based on clinical presentation aggressive therapy to determine etiology of pulmonary embolism. Consider echocardiogram every other year to monitor progression of aortic valve disease. Pulmonary note from today appreciated. Continue apixaban 10 mg twice daily and then maintenance dose. Need to clarify if patient qualifies for home O2.  2D echo completed with following results--    Summary   Left ventricle grossly normal in size. Mild left ventricular concentric hypertrophy noted. Normal LV segmental wall motion. Estimated left ventricular ejection fraction is 58±5%. Does not meet 50% diagnostic criteria for diastolic dysfunction. The LAESV Index is <34ml/m2. Borderline dilated right ventricle. TAPSE is normal   Mild aortic stenosis is present.    Aortic valve dimensionless valve index . 41   Physiologic and/or trace tricuspid regurgitation. RVSP is 39 mmHg. Technically fair quality study. Technically difficult study due to patient''s body habitus. No comparison study available. Suggest clinical correlation. Await endocrinology consult. Obtain ambulatory pulse oximetry. 12/6/2022-patient sitting in bedside chair; no chest pain no dyspnea. Still having troubles weaning from oxygen. Daughter present through the exam.  Chest x-ray about to be done. She is hoping for discharge today; has been cleared for discharge by cardiology nephrology; pulmonary is awaiting results of chest x-ray; awaiting endocrinology for discharge. Afebrile last 24 hours. Pulse 76 blood pressure 148/72. SPO2 98 on 2 L nasal cannula. Patient dropped to 87 SPO2 while on room air last evening. Intake and output -2985 cc. BUN 48 creatinine 6.9. Fasting glucose 335 calcium 7.9 phosphorus 5.5. CRP 19.9, improving. Albumin 3.3 WBC 9.5 hemoglobin 8.8. . Endocrinology consult from yesterday appreciated. Changes were made with Lantus 12 units nightly, Humalog 6 units 3 times daily with meals, low-dose sliding scale insulin. Arrange to be seen in clinic upon discharge for maintenance. Stool for occult blood was negative. Cardiology note from today appreciated.  note from today, patient will likely require oxygen at home. Patient underwent hemodialysis today with 2000 cc net removal.  Physical therapy AMPAC score from today 17/24. Pulmonary note from today, sitting up in chair slight increase in cough. Continue with Eliquis taper within maintenance. Continue oxygen taper may need for home use. Saline nasal spray. Check chest x-ray today to make sure there is no changes. Possible discharge once above is clarified.           Objective:     PHYSICAL EXAM:    VS: BP (!) 148/72   Pulse 76   Temp 97.7 °F (36.5 °C) (Oral)   Resp 18   Ht 5' 4\" (1.626 m)   Wt 260 lb 2.3 oz (118 kg)   SpO2 98%   BMI 44.65 kg/m²     Labs:   CBC:   Lab Results   Component Value Date/Time    WBC 9.5 12/06/2022 02:47 AM    RBC 2.74 12/06/2022 02:47 AM    HGB 8.8 12/06/2022 02:47 AM    HCT 27.6 12/06/2022 02:47 AM    .7 12/06/2022 02:47 AM    MCH 32.1 12/06/2022 02:47 AM    MCHC 31.9 12/06/2022 02:47 AM    RDW 13.6 12/06/2022 02:47 AM     12/06/2022 02:47 AM    MPV 10.0 12/06/2022 02:47 AM     CBC with Differential:    Lab Results   Component Value Date/Time    WBC 9.5 12/06/2022 02:47 AM    RBC 2.74 12/06/2022 02:47 AM    HGB 8.8 12/06/2022 02:47 AM    HCT 27.6 12/06/2022 02:47 AM     12/06/2022 02:47 AM    .7 12/06/2022 02:47 AM    MCH 32.1 12/06/2022 02:47 AM    MCHC 31.9 12/06/2022 02:47 AM    RDW 13.6 12/06/2022 02:47 AM    NRBC 0.0 01/13/2022 04:38 AM    LYMPHOPCT 10.7 12/06/2022 02:47 AM    MONOPCT 8.1 12/06/2022 02:47 AM    BASOPCT 0.4 12/06/2022 02:47 AM    MONOSABS 0.77 12/06/2022 02:47 AM    LYMPHSABS 1.02 12/06/2022 02:47 AM    EOSABS 0.43 12/06/2022 02:47 AM    BASOSABS 0.04 12/06/2022 02:47 AM     Hemoglobin/Hematocrit:    Lab Results   Component Value Date/Time    HGB 8.8 12/06/2022 02:47 AM    HCT 27.6 12/06/2022 02:47 AM     CMP:    Lab Results   Component Value Date/Time     12/06/2022 02:47 AM    K 4.9 12/06/2022 02:47 AM    K 4.2 08/23/2022 12:12 PM    CL 92 12/06/2022 02:47 AM    CO2 26 12/06/2022 02:47 AM    BUN 48 12/06/2022 02:47 AM    CREATININE 6.9 12/06/2022 02:47 AM    GFRAA 8 09/22/2022 12:14 PM    LABGLOM 6 12/06/2022 02:47 AM    GLUCOSE 335 12/06/2022 02:47 AM    PROT 6.4 12/06/2022 02:47 AM    LABALBU 3.3 12/06/2022 02:47 AM    CALCIUM 7.9 12/06/2022 02:47 AM    BILITOT 0.2 12/06/2022 02:47 AM    ALKPHOS 87 12/06/2022 02:47 AM    AST 9 12/06/2022 02:47 AM    ALT 8 12/06/2022 02:47 AM     BMP:    Lab Results   Component Value Date/Time     12/06/2022 02:47 AM    K 4.9 12/06/2022 02:47 AM    K 4.2 08/23/2022 12:12 PM    CL 92 12/06/2022 02:47 AM    CO2 26 12/06/2022 02:47 AM    BUN 48 12/06/2022 02:47 AM    LABALBU 3.3 12/06/2022 02:47 AM    CREATININE 6.9 12/06/2022 02:47 AM    CALCIUM 7.9 12/06/2022 02:47 AM    GFRAA 8 09/22/2022 12:14 PM    LABGLOM 6 12/06/2022 02:47 AM    GLUCOSE 335 12/06/2022 02:47 AM     Hepatic Function Panel:    Lab Results   Component Value Date/Time    ALKPHOS 87 12/06/2022 02:47 AM    ALT 8 12/06/2022 02:47 AM    AST 9 12/06/2022 02:47 AM    PROT 6.4 12/06/2022 02:47 AM    BILITOT 0.2 12/06/2022 02:47 AM    BILIDIR <0.2 12/06/2022 02:47 AM    IBILI see below 12/06/2022 02:47 AM    LABALBU 3.3 12/06/2022 02:47 AM     Ionized Calcium:  No results found for: IONCA  Magnesium:    Lab Results   Component Value Date/Time    MG 2.2 12/06/2022 02:47 AM     Phosphorus:    Lab Results   Component Value Date/Time    PHOS 5.5 12/06/2022 02:47 AM     LDH:    Lab Results   Component Value Date/Time     01/11/2022 12:45 PM     Uric Acid:    Lab Results   Component Value Date/Time    LABURIC 3.3 12/04/2022 05:30 AM     PT/INR:    Lab Results   Component Value Date/Time    PROTIME 11.0 08/23/2022 12:12 PM    INR 1.0 08/23/2022 12:12 PM     Warfarin PT/INR:  No components found for: PTPATWAR, PTINRWAR  PTT:    Lab Results   Component Value Date/Time    APTT 136.2 12/04/2022 09:18 AM   [APTT}  Troponin:  No results found for: TROPONINI  Last 3 Troponin:  No results found for: TROPONINI  U/A:    Lab Results   Component Value Date/Time    COLORU Yellow 11/18/2021 03:19 AM    PROTEINU >=300 11/18/2021 03:19 AM    PHUR 6.0 11/18/2021 03:19 AM    WBCUA 1-3 11/18/2021 03:19 AM    RBCUA 0-1 11/18/2021 03:19 AM    BACTERIA FEW 11/18/2021 03:19 AM    CLARITYU Clear 11/18/2021 03:19 AM    SPECGRAV 1.020 11/18/2021 03:19 AM    LEUKOCYTESUR Negative 11/18/2021 03:19 AM    UROBILINOGEN 0.2 11/18/2021 03:19 AM    BILIRUBINUR Negative 11/18/2021 03:19 AM    BLOODU SMALL 11/18/2021 03:19 AM    GLUCOSEU 500 11/18/2021 03:19 AM     ABG:  No results found for: PH, PCO2, PO2, HCO3, BE, THGB, TCO2, O2SAT  HgBA1c:    Lab Results   Component Value Date/Time    LABA1C 12.7 12/03/2022 12:09 PM     FLP:    Lab Results   Component Value Date/Time    TRIG 90 12/04/2022 05:30 AM    HDL 52 12/04/2022 05:30 AM    LDLCALC 85 12/04/2022 05:30 AM    LABVLDL 18 12/04/2022 05:30 AM     TSH:    Lab Results   Component Value Date/Time    TSH 1.470 12/03/2022 12:09 PM     VITAMIN B12: No components found for: B12  FOLATE:  No results found for: FOLATE  IRON:    Lab Results   Component Value Date/Time    IRON 25 12/04/2022 05:30 AM     Iron Saturation:  No components found for: PERCENTFE  TIBC:    Lab Results   Component Value Date/Time    TIBC 170 12/04/2022 05:30 AM     FERRITIN:    Lab Results   Component Value Date/Time    FERRITIN 1,986 12/04/2022 05:30 AM        General appearance: Alert, Awake, Oriented times 3, no distress; morbidly obese  Skin: Warm and dry ; no rashes  Head: Normocephalic. No masses, lesions or tenderness noted  Eyes: Conjunctivae pale, sclera white. PERRL,EOM-I  Ears: External ears normal  Nose/Sinuses: Nares normal. Septum midline. Mucosa normal. No drainage  Oropharynx: Oropharynx clear with no exudate seen  Neck: Supple. No jugular venous distension, lymphadenopathy or thyromegaly Trachea midline  Lungs: Mostly clear  Heart: S1 S2  Regular rate and rhythm. No rub, gallop, murmur  Abdomen: Soft, non-tender. BS normal. No masses, organomegaly; no rebound or guarding  Extremities: Chronic 2-3+ bilateral edema, she states since starting dialysis  Musculoskeletal: Muscular strength appears intact. Neuro:  No focal motor defects ; II-XII grossly intact .  MORRIS equally    TELEMETRY: REVIEWED--Telemetry: Sinus and occasional PVCs    ASSESSMENT:   Principal Problem:    Acute pulmonary embolism (HCC)  Active Problems:    H/O heart artery stent    Pulmonary embolism and infarction ([facility]    Diabetes mellitus ([facility]    ESRD (end stage renal disease) ([facility]    Essential hypertension    Hyperlipidemia    Diabetes mellitus type 2 with complications ([facility]    Pulmonary hypertension, unspecified (HCC)    Obesity, Class III, BMI 40-49.9 (morbid obesity) ([facility]  Resolved Problems:    * No resolved hospital problems. *      PLAN:  SEE ORDERS      RE  CHANGES AND FINDINGS   Medications reviewed with patient  GI prophylaxis  DVT prophylaxis  Consultants notes reviewed    Aspirin 81 mg daily  PhosLo 667, 1 capsule 3 times daily  Low-dose sliding scale insulin  Protonix 40 mg daily  Heparin drip IV  Acetaminophen 650 mg every 8 hours as needed for pain  Fentanyl 50 mcg IV every 6 hours as needed for pain  Endocrinology consult regarding poorly controlled diabetes; patient had stopped metformin and was controlling her glucose with \"diet\". Conversion to apixaban when okay with pulmonary  12/5/2022  Heparin drip has been discontinued  Apixaban 10 mg twice daily x7 days then 5 mg twice daily  Ambulatory pulse oximetry  Patient refused atorvastatin  Lantus 5 units subcu twice daily  Low-dose sliding scale insulin  Humalog 3 units 3 times daily with meals  Await endocrinology consult  Hemodialysis in a.m.  12/6/2022  Med reconciliation completed  Prescriptions written  Apixaban 10 mg twice daily for 7 days then 5 mg twice daily  Defer insulin orders to endocrinology  Aspirin 81 milligrams daily  Atorvastatin 40 mg daily, patient declining  PhosLo 667, 3 times daily  Follow-up Dr. Stevan Dixon 1 week  Follow-up nephrology per their instructions  Follow-up pulmonary per their instructions        Discussed with patient and family and nursing. Van Anne DO     2:39 PM     12/6/2022    TIME > 35 MINUTES    >  50 %  OF  TIME  DISCUSSION               ------------  INFORMATION  -----------      DIET:ADULT DIET; Regular; 4 carb choices (60 gm/meal);  Low Fat/Low Chol/High Fiber/CHELSEA        Allergies   Allergen Reactions    Benzonatate Other (See Comments)     Patient states \"it was like I was drunk. \"    Morphine Itching    Pcn [Penicillins] Rash    Sodium Hypochlorite Nausea And Vomiting and Rash     AKA BLEACH. RASH FROM SKIN EXPOSURE          MEDICATION SIDE EFFECTS:none       SCHEDULED MEDS:                                  insulin glargine  12 Units SubCUTAneous BID    [START ON 12/7/2022] rosuvastatin  5 mg Oral Q MWF    sodium chloride  1 spray Each Nostril BID    insulin lispro  6 Units SubCUTAneous TID WC    insulin lispro  0-6 Units SubCUTAneous TID WC    apixaban  10 mg Oral BID    Followed by    [facility] ON 12/11/2022] apixaban  5 mg Oral BID    aspirin  81 mg Oral Daily    calcium acetate  667 mg Oral TID WC    pantoprazole  40 mg Oral QAM AC    insulin lispro  0-4 Units SubCUTAneous Nightly    epoetin sadia-epbx  30 Units/kg SubCUTAneous Once per day on Mon Wed Fri       Continuous Infusions:   dextrose           Data:       Intake/Output Summary (Last 24 hours) at 12/6/2022 1439  Last data filed at 12/6/2022 1217  Gross per 24 hour   Intake 675 ml   Output 2500 ml   Net -1825 ml       Wt Readings from Last 3 Encounters:   12/06/22 260 lb 2.3 oz (118 kg)   10/03/22 260 lb (117.9 kg)   09/22/22 260 lb (117.9 kg)       Labs: Additional    GLUCOSE:No results for input(s): POCGLU in the last 72 hours. BNP:No results found for: BNP    CRP:   Recent Labs     12/04/22  0530 12/05/22  0310 12/06/22  0247   CRP 29.0* 30.4* 19.9*       ESR:  Recent Labs     12/04/22  0530 12/05/22  0310 12/06/22  0247   SEDRATE 99* 124* 121*       RADIOLOGY: REVIEWED AVAILABLE REPORT  US DUP LOWER EXTREMITIES BILATERAL VENOUS   Final Result   No evidence of DVT in either lower extremity. CTA PULMONARY W CONTRAST   Final Result   1. Pulmonary embolus seen within the segmental and subsegmental pulmonary   arteries of the medial basal segment and posterior basal segment of the right   lower lobe. 2. There are no findings of right ventricular strain   3.  There is no pulmonary embolus seen within the pulmonary arteries of the   left lung   4. Patchy airspace disease within the right lower lobe which could represent   pneumonia or possibly an early pulmonary infarct   5. Linear atelectasis seen within the left lung base   6. Small amount of pleural fluid seen within the right major fissure. CT ABDOMEN PELVIS W IV CONTRAST Additional Contrast? None   Final Result   Trace to small right pleural effusion and adjacent atelectasis at the right   lung base      No acute findings of the abdomen or pelvis. Fat containing infraumbilical   hernia measuring 2.4 cm neck of hernia without associated bowel.          XR CHEST PORTABLE    (Results Pending)             Aguilar Buchanan DO    2:39 PM     12/6/2022      Voice recognition software used for dictation

## 2022-12-06 NOTE — PROGRESS NOTES
PROGRESS NOTE       PATIENT PROBLEM LIST:  Patient Active Problem List   Diagnosis Code    Nausea & vomiting R11.2    MSSA bacteremia R78.81, B95.61    CLABSI (central line-associated bloodstream infection) T80.211A    ESRD (end stage renal disease) ([facility] N18.6    Fever, unspecified R50.9    Essential hypertension I10    Hyperlipidemia E78.5    Diabetes mellitus type 2 with complications ([facility] N63.4    Neck pain M54.2    Anemia in CKD (chronic kidney disease) N18.9, D63.1    Pneumonia due to COVID-19 virus U07.1, J12.82    Pulmonary hypertension, unspecified (HCC) I27.20    Obesity, Class III, BMI 40-49.9 (morbid obesity) ([facility] E66.01    History of Clostridioides difficile colitis Z86.19    Anxiety and depression F41.9, F32. A    At high risk for falls Z91.81    Dizziness on standing L18    Periumbilical abdominal pain R10.33    Encounter regarding vascular access for dialysis for end-stage renal disease ([facility] [address] N18.6, Z99.2    Acute pulmonary embolism (HCC) I26.99    H/O heart artery stent Z95.5    Pulmonary embolism and infarction ([facility] [address] I26.99    Diabetes mellitus ([facility] E11.9       SUBJECTIVE:  Yasmeen Guillory states she feels somewhat better and is alert and denies any shortness of breath but has significant pruritus for which she states [facility] for a \"loop\". Denies any chest discomfort nor hemoptysis. REVIEW OF SYSTEMS:  General ROS: negative for - fatigue, malaise,  weight gain or weight loss  Psychological ROS: negative for - anxiety , depression  Ophthalmic ROS: negative for - decreased vision or visual distortion. ENT ROS: negative  Allergy and Immunology ROS: negative  Hematological and Lymphatic ROS: negative  Endocrine: no heat or cold intolerance and no polyphagia, polydipsia, or polyuria  Respiratory ROS: positive for - shortness of breath  Cardiovascular ROS: positive for - edema and shortness of breath.   Gastrointestinal ROS: no abdominal pain, change in bowel habits, or black or bloody stools  Genito-Urinary ROS: no nocturia, dysuria, trouble voiding, frequency or hematuria  Musculoskeletal ROS: negative for- myalgias, arthralgias, or claudication  Neurological ROS: no TIA or stroke symptoms otherwise no significant change in symptoms or problems since yesterday as documented in previous progress notes. SCHEDULED MEDICATIONS:   insulin glargine  12 Units SubCUTAneous BID    insulin lispro  6 Units SubCUTAneous TID WC    insulin lispro  0-6 Units SubCUTAneous TID WC    apixaban  10 mg Oral BID    Followed by    Toi Mclean ON 12/11/2022] apixaban  5 mg Oral BID    aspirin  81 mg Oral Daily    calcium acetate  667 mg Oral TID WC    pantoprazole  40 mg Oral QAM AC    insulin lispro  0-4 Units SubCUTAneous Nightly    epoetin sadia-epbx  30 Units/kg SubCUTAneous Once per day on Mon Wed Fri       VITAL SIGNS:                                                                                                                          BP (!) 161/48   Pulse 72   Temp 98.3 °F (36.8 °C) (Oral)   Resp 18   Ht 5' 4\" (1.626 m)   Wt 257 lb 11.5 oz (116.9 kg)   SpO2 99%   BMI 44.24 kg/m²   Patient Vitals for the past 96 hrs (Last 3 readings):   Weight   12/03/22 1715 257 lb 11.5 oz (116.9 kg)   12/03/22 1240 259 lb 14.8 oz (117.9 kg)   12/03/22 0804 260 lb (117.9 kg)     OBJECTIVE:    HEENT: PERRL, EOM  Intact; sclera non-icteric, conjunctiva pink. Carotids are brisk in upstroke with normal contour. No carotid bruits. Normal jugular venous pulsation at 45°. No palpable cervical nor supraclavicular nodes. Thyroid not palpable. Trachea midline. Chest: Even excursion  Lungs: CTA B, no expiratory wheezes or rhonchi, no decreased tactile fremitus without inspiratory rales. Heart: Regular  rhythm; S1 > S2, no gallop or murmur. No clicks, rub, palpable thrills   or heaves. PMI nondisplaced, 5th intercostal space MCL. Abdomen: Soft, nontender, nondistended,  grossly protuberant, no masses or organomegaly.   Bowel sounds active. Extremities: Without clubbing, cyanosis 2+ bilateral lower pretibial edema. Erythematous desquamating tissue bilateral pretibial aspect lower extremities. Pulses present 3+ upper extermities bilaterally; present 1+ DP  bilaterally and present 1+ PT bilaterally. Data:   Scheduled Meds: Reviewed  Continuous Infusions:    dextrose         Intake/Output Summary (Last 24 hours) at 12/6/2022 1133  Last data filed at 12/6/2022 0327  Gross per 24 hour   Intake 375 ml   Output 200 ml   Net 175 ml     CBC:   Recent Labs     12/04/22 0530 12/05/22 0310 12/06/22  0247   WBC 11.7* 9.1 9.5   HGB 9.8* 9.0* 8.8*   HCT 30.4* 28.0* 27.6*    202 212     BMP:  Recent Labs     12/04/22 0530 12/05/22 0310 12/06/22  0247    136 133   K 3.8 4.3 4.9   CL 92* 94* 92*   CO2 29 28 26   BUN 21 35* 48*   CREATININE 3.7* 5.4* 6.9*   LABGLOM 13 8 6     ABGs: No results found for: PH, PO2, PCO2  INR: No results for input(s): INR in the last 72 hours. PRO-BNP:   Lab Results   Component Value Date    PROBNP 29,968 (H) 12/04/2022    PROBNP 31,643 (H) 12/03/2022      TSH:   Lab Results   Component Value Date    TSH 1.470 12/03/2022      Cardiac Injury Profile: No results for input(s): TROPHS in the last 72 hours. Lipid Profile:   Lab Results   Component Value Date/Time    TRIG 90 12/04/2022 05:30 AM    HDL 52 12/04/2022 05:30 AM    LDLCALC 85 12/04/2022 05:30 AM    CHOL 155 12/04/2022 05:30 AM      Hemoglobin A1C: No components found for: HGBA1C      RAD:   US DUP LOWER EXTREMITIES BILATERAL VENOUS   Final Result   No evidence of DVT in either lower extremity. CTA PULMONARY W CONTRAST   Final Result   1. Pulmonary embolus seen within the segmental and subsegmental pulmonary   arteries of the medial basal segment and posterior basal segment of the right   lower lobe. 2. There are no findings of right ventricular strain   3.  There is no pulmonary embolus seen within the pulmonary arteries of the   left lung 4. Patchy airspace disease within the right lower lobe which could represent   pneumonia or possibly an early pulmonary infarct   5. Linear atelectasis seen within the left lung base   6. Small amount of pleural fluid seen within the right major fissure. CT ABDOMEN PELVIS W IV CONTRAST Additional Contrast? None   Final Result   Trace to small right pleural effusion and adjacent atelectasis at the right   lung base      No acute findings of the abdomen or pelvis. Fat containing infraumbilical   hernia measuring 2.4 cm neck of hernia without associated bowel. EKG: See Report  Echo: See Report      IMPRESSIONS:  Patient Active Problem List   Diagnosis Code    Nausea & vomiting R11.2    MSSA bacteremia R78.81, B95.61    CLABSI (central line-associated bloodstream infection) T80.211A    ESRD (end stage renal disease) ([address] N18.6    Fever, unspecified R50.9    Essential hypertension I10    Hyperlipidemia E78.5    Diabetes mellitus type 2 with complications ([address] I34.9    Neck pain M54.2    Anemia in CKD (chronic kidney disease) N18.9, D63.1    Pneumonia due to COVID-19 virus U07.1, J12.82    Pulmonary hypertension, unspecified (HCC) I27.20    Obesity, Class III, BMI 40-49.9 (morbid obesity) ([address] E66.01    History of Clostridioides difficile colitis Z86.19    Anxiety and depression F41.9, F32. A    At high risk for falls Z91.81    Dizziness on standing E75    Periumbilical abdominal pain R10.33    Encounter regarding vascular access for dialysis for end-stage renal disease ([address] N18.6, Z99.2    Acute pulmonary embolism (HCC) I26.99    H/O heart artery stent Z95.5    Pulmonary embolism and infarction ([address] I26.99    Diabetes mellitus ([address] E11.9       RECOMMENDATIONS:  Would consider full delineation of her underlying markedly elevated sedimentation rate if this has not been previously addressed. Likewise her hemoglobin A1c is extraordinarily elevated.   Would thus strive to maintain her LDL cholesterol at < 55 mg/dL in accordance with updated 2020 ACC/AHA/AACE/ESC/EAS cholesterol guidelines. continue therapeutic intervention for pulmonary embolism as well. I have spent more than 26 minutes face to face with Caryn Blackburn and reviewing notes and laboratory data, with greater than 50% of this time instructing and counseling the patient face to face regarding my findings and recommendations and I have answered all questions as posed to me by MsSridhar Trevor. Rickie Mckeon, DO FACP,FACC,FSCAI      NOTE:  This report was transcribed using voice recognition software.   Every effort was made to ensure accuracy; however, inadvertent computerized transcription errors may be present

## 2022-12-06 NOTE — PROGRESS NOTES
Physical Therapy  Facility/Department: [address] INTERMEDIATE 1  Physical Therapy Initial Assessment    Name: Crystal Luan  : 1955  MRN: [id number]  Date of Service: 2022      Patient Diagnosis(es): The primary encounter diagnosis was Multiple subsegmental pulmonary emboli without acute cor pulmonale ([facility] [address] A diagnosis of ESRD needing dialysis [facility]) was also pertinent to this visit. Past Medical History:  has a past medical history of Acute pulmonary embolism ([facility] [address] Anemia in CKD (chronic kidney disease), Anxiety and depression, At high risk for falls, Brain aneurysm, CLABSI (central line-associated bloodstream infection), Cough, Diabetes mellitus ([facility] [address] Diabetes mellitus type 2 with complications ([facility] [address] Dizziness on standing, ESRD (end stage renal disease) ([facility] [address] ESRD on hemodialysis ([facility] [address] Essential hypertension, Fever, unspecified, Gastrointestinal hemorrhage, H/O heart artery stent, History of Clostridioides difficile colitis, Hyperlipidemia, Hypertension, MSSA bacteremia, Nausea & vomiting, Obesity, Class III, BMI 40-49.9 (morbid obesity) ([facility] [address] and Periumbilical abdominal pain. Past Surgical History:  has a past surgical history that includes Cardiac surgery;  section; vascular surgery (N/A, 2021); Upper gastrointestinal endoscopy (N/A, 2021); Dialysis fistula creation (Left, 2022); Colonoscopy (); Cardiac catheterization (); and Percutaneous Transluminal Coronary Angio (). Evaluating Therapist: Nikos Espinal PT    Room #:  8726/5498-I  Diagnosis:  Pulmonary embolism and infarction [facility]) [I26.99]  ESRD needing dialysis ([facility] [address] [N18.6, Z99.2]  Multiple subsegmental pulmonary emboli without acute cor pulmonale (HCC) [I26.94]  PMHx/PSHx:  CKD on dialysis, DM  Precautions:  falls, O2      Social:  Pt lives with daughter in a 1 floor plan 1 steps  to enter. Prior to admission independent with ww.  Daughter assists as needed     Initial Evaluation  Date:  functional mobility   [x] Balance Training to improve static/dynamic balance and to reduce fall risk  [x] Endurance Training to improve activity tolerance during functional mobility   [x] Transfer Training to improve safety and independence with all functional transfers   [x] Gait Training to improve gait mechanics, endurance and assess need for appropriate assistive device  [] Stair Training in preparation for safe discharge home and/or into the community   [] Positioning to prevent skin breakdown and contractures  [x] Safety and Education Training   [x] Patient/Caregiver Education   [] HEP  [] Other     PT long term treatment goals are located in above grid    Frequency of treatments: 2-5x/week x 3-5 days. Time in  120  Time out  134      Evaluation Time includes thorough review of current medical information, gathering information on past medical history/social history and prior level of function, completion of standardized testing/informal observation of tasks, assessment of data and education on plan of care and goals.       CPT codes:  [x] Low Complexity PT evaluation 66402  [] Moderate Complexity PT evaluation 39249  [] High Complexity PT evaluation 30550  [] PT Re-evaluation 52999  [] Gait training 99095 minutes  [] Manual therapy 44426 minutes  [] Therapeutic activities 19932 minutes  [] Therapeutic exercises 35925 minutes  [] Neuromuscular reeducation 76825 minutes     [facility] PT [id number]

## 2022-12-06 NOTE — DISCHARGE INSTRUCTIONS
Follow-up endocrinology, pulmonary, cardiology per their instructions      Your information:  Name: Mariia Carlos  : 1955    Your instructions:        What to do after you leave the hospital:    Recommended diet: diabetic diet and renal diet    Recommended activity: activity as tolerated        The following personal items were collected during your admission and were returned to you:    Belongings  Dental Appliances: None  Vision - Corrective Lenses: Other (Comment) (cataracts)  Hearing Aid: None  Clothing: Footwear, Pants, Shirt, Socks  Jewelry: None  Electronic Devices: Cell Phone  Weapons (Notify Protective Services/Security): None  Home Medications: None  Valuables Given To: Patient  Provide Name(s) of Who Valuable(s) Were Given To: patient    Information obtained by:  By signing below, I understand that if any problems occur once I leave the hospital I am to contact my primary doctor. I understand and acknowledge receipt of the instructions indicated above.

## 2022-12-06 NOTE — CARE COORDINATION
CASE MANAGEMENT. ... Noted patient will require home o2. Is currently in HD. Will choice for dme when patient returns.

## 2022-12-06 NOTE — PROGRESS NOTES
[facility] MD Virginia Barron MD Tanner Flakes, MD Adán Miramontes, CASH Echavarria, YOSHI Nascimento CNP  Progress Note    12/6/2022    SUBJECTIVE:   12/5: Sitting up in the chair with her daughter present at bedside. Feels better today. Pain has improved. On NC. In NAD.     12/6: Seen while in dialysis. Tolerating treatment without complications. Does report \"itching all over. \" Denies dyspnea or chest pain. Pain in her back continues to improve. She is hoping to leave later today. PROBLEM LIST:    Principal Problem:    Acute pulmonary embolism (HCC)  Active Problems:    H/O heart artery stent    Pulmonary embolism and infarction (HCC)    Diabetes mellitus ([facility] [address]    ESRD (end stage renal disease) ([facility] [address]    Essential hypertension    Hyperlipidemia    Diabetes mellitus type 2 with complications ([facility] [address]    Pulmonary hypertension, unspecified (HCC)    Obesity, Class III, BMI 40-49.9 (morbid obesity) ([facility] [address]  Resolved Problems:    * No resolved hospital problems. *         DIET:    ADULT DIET; Regular; 4 carb choices (60 gm/meal);  Low Fat/Low Chol/High Fiber/CHELSEA     MEDS (scheduled):    insulin glargine  12 Units SubCUTAneous BID    insulin lispro  6 Units SubCUTAneous TID WC    insulin lispro  0-6 Units SubCUTAneous TID WC    apixaban  10 mg Oral BID    Followed by    Marcelo Schmitz ON 12/11/2022] apixaban  5 mg Oral BID    aspirin  81 mg Oral Daily    calcium acetate  667 mg Oral TID WC    pantoprazole  40 mg Oral QAM AC    insulin lispro  0-4 Units SubCUTAneous Nightly    epoetin sadia-epbx  30 Units/kg SubCUTAneous Once per day on Mon Wed Fri       MEDS (infusions):   dextrose         MEDS (prn):  diphenhydrAMINE, sodium chloride flush, acetaminophen, midodrine, glucose, dextrose bolus **OR** dextrose bolus, glucagon (rDNA), dextrose, fentanNYL    PHYSICAL EXAM:     Patient Vitals for the past 24 hrs:   BP Temp Temp src Pulse Resp SpO2   12/06/22 1100 (!) 161/48 -- -- 72 -- -- 12/06/22 1030 (!) 165/55 -- -- 71 -- --   12/06/22 1000 (!) 139/47 -- -- (!) 48 -- --   12/06/22 0930 (!) 148/50 -- -- 51 -- --   12/06/22 0900 (!) 145/63 -- -- (!) 49 -- --   12/06/22 0800 (!) 136/56 -- -- 67 -- --   12/06/22 0656 (!) 144/65 98.3 °F (36.8 °C) Oral 73 18 --   12/06/22 0250 137/68 98.3 °F (36.8 °C) Oral 68 18 99 %   12/05/22 2013 (!) 113/48 98.7 °F (37.1 °C) Oral 76 19 98 %   12/05/22 1545 -- -- -- -- -- 96 %   12/05/22 1527 -- -- -- -- -- (!) 87 %   12/05/22 1445 (!) 134/54 98 °F (36.7 °C) Oral 75 20 97 %     @      Intake/Output Summary (Last 24 hours) at 12/6/2022 1136  Last data filed at 12/6/2022 0327  Gross per 24 hour   Intake 375 ml   Output 200 ml   Net 175 ml           Wt Readings from Last 3 Encounters:   12/03/22 257 lb 11.5 oz (116.9 kg)   10/03/22 260 lb (117.9 kg)   09/22/22 260 lb (117.9 kg)       Constitutional:  in no acute distress  HEENT: NC/AT, EOMI, sclera and conjunctiva are clear and anicteric, mucus membranes moist  Neck: Trachea midline, no JVD  Cardiovascular: S1, S2 regular rhythm. Systolic ejection murmur  Respiratory:  CTAB, diminished in the bases.  No crackles, no wheeze  Gastrointestinal:  Soft, nontender, nondistended, NABS  Ext: +1 BLE edema, feet warm  Skin: dry, no rash  Neuro: awake, alert, interactive      DATA:    Recent Labs     12/04/22  0530 12/05/22  0310 12/06/22  0247   WBC 11.7* 9.1 9.5   HGB 9.8* 9.0* 8.8*   HCT 30.4* 28.0* 27.6*   .3* 102.2* 100.7*    202 212       Recent Labs     12/04/22  0530 12/05/22  0310 12/06/22  0247    136 133   K 3.8 4.3 4.9   CL 92* 94* 92*   CO2 29 28 26   MG 2.0 2.2 2.2   PHOS 4.0 4.5 5.5*   BUN 21 35* 48*   CREATININE 3.7* 5.4* 6.9*   ALT 7 8 8   AST 10 11 9   BILIDIR <0.2 <0.2 <0.2   BILITOT 0.3 0.2 0.2   ALKPHOS 83 81 87         No results found for: LABPROT    Assessment  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, renal microvascular atherosclerotic disease, and history of acute kidney injury due to contrast-induced nephropathy  Anemia due to ESRD. 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   Status post Lovenox in the ED    Recommendations  Continue IHD support for solute and volume clearance on Tuesdays Thursdays and Saturdays as per outpatient orders and dry weight  Continue JHONATAN: Retacrit while here  Continue other aspects of renal management  Follow labs, BMP  Continue supportive care       Electronically signed by Mele Boo MD on 12/6/2022 at 11:36 AM

## 2022-12-06 NOTE — CARE COORDINATION
CASE MANAGEMENT. .. Discussed dme choices for home o2. Referral called to [facility] [place] with MEENA. In anticipation for dc today/tomorrow, he will have portable o2 delivered to patients room today. Patient instructed to call [facility] when she gets home so that arrangements can be made for delivery of concentrator  Pulm/Renal on board. CXR ordered. Will follow.

## 2022-12-06 NOTE — PROGRESS NOTES
[facility]  [address] [facility], [address], [address]       Pulmonary Progress Note      SUBJECTIVE:  sitting up in chair on 3 li NC, claims doing ok with breathing, ambulating some and tolerating. (?) slightly increased cough since last night, not much sputum production, (?) increased nasal congestion. Mentions getting nasal spray but none ordered.     OBJECTIVE    Medications    Continuous Infusions:   dextrose         Scheduled Meds:   insulin glargine  12 Units SubCUTAneous BID    [START ON 2022] rosuvastatin  5 mg Oral Q MWF    sodium chloride  1 spray Each Nostril BID    insulin lispro  6 Units SubCUTAneous TID WC    insulin lispro  0-6 Units SubCUTAneous TID WC    apixaban  10 mg Oral BID    Followed by    Saintclair Muskrat ON 2022] apixaban  5 mg Oral BID    aspirin  81 mg Oral Daily    calcium acetate  667 mg Oral TID WC    pantoprazole  40 mg Oral QAM AC    insulin lispro  0-4 Units SubCUTAneous Nightly    epoetin sadia-epbx  30 Units/kg SubCUTAneous Once per day on        PRN Meds:diphenhydrAMINE, sodium chloride flush, acetaminophen, midodrine, glucose, dextrose bolus **OR** dextrose bolus, glucagon (rDNA), dextrose, fentanNYL    Physical    VITALS:  BP (!) 148/72   Pulse 76   Temp 97.7 °F (36.5 °C) (Oral)   Resp 18   Ht 5' 4\" (1.626 m)   Wt 260 lb 2.3 oz (118 kg)   SpO2 98%   BMI 44.65 kg/m²     24HR INTAKE/OUTPUT:      Intake/Output Summary (Last 24 hours) at 2022 1347  Last data filed at 2022 1217  Gross per 24 hour   Intake 675 ml   Output 2500 ml   Net -1825 ml         24HR PULSE OXIMETRY RANGE:    SpO2  Av.8 %  Min: 87 %  Max: 99 %    General appearance: alert, appears stated age, and cooperative, obese  Lungs: rhonchi bibasilar minimal  Heart: regular rate and rhythm, S1, S2 normal, no murmur, click, rub or gallop  Abdomen: soft, non-tender; bowel sounds normal; no masses,  no organomegaly  Extremities: edema bipedal minimal with chronic skin changes  Neurologic: Mental status: Alert, oriented, thought content appropriate    Data    CBC:   Recent Labs     12/04/22  0530 12/05/22 0310 12/06/22  0247   WBC 11.7* 9.1 9.5   HGB 9.8* 9.0* 8.8*   HCT 30.4* 28.0* 27.6*   .3* 102.2* 100.7*    202 212         BMP:  Recent Labs     12/04/22 0530 12/05/22 0310 12/06/22  0247    136 133   K 3.8 4.3 4.9   CL 92* 94* 92*   CO2 29 28 26   PHOS 4.0 4.5 5.5*   BUN 21 35* 48*   CREATININE 3.7* 5.4* 6.9*      ALB:3,BILIDIR:3,BILITOT:3,ALKPHOS:3)@    PT/INR: No results for input(s): PROTIME, INR in the last 72 hours. ABG:   No results for input(s): PH, PO2, PCO2, HCO3, BE, O2SAT, METHB, O2HB, COHB, O2CON, HHB, THB in the last 72 hours. Radiology/Other tests reviewed: none    Assessment:     Principal Problem:    Acute pulmonary embolism (HCC)  Active Problems:    H/O heart artery stent    Pulmonary embolism and infarction (HCC)    Diabetes mellitus ([facility]    ESRD (end stage renal disease) ([facility] [address]    Essential hypertension    Hyperlipidemia    Diabetes mellitus type 2 with complications ([facility] [address]    Pulmonary hypertension, unspecified (HCC)    Obesity, Class III, BMI 40-49.9 (morbid obesity) ([facility]  Resolved Problems:    * No resolved hospital problems. *      Plan:       Cont with eliquis taper and then maintenance dose  Cont with oxygen, taper as tolerated, will need to see if qualifies for home use prior to discharge  Nasal saline bid and see if helps with nasal congestion, (?) more the reason for cough. Will check CXR today and make sure no changes since admission  Cont with HD as per Renal  OOB to chair as tolerated  Possible discharge once above clarified      Time at the bedside, reviewing labs and radiographs, reviewing notes and consultations, discussing with staff and family was more than 35 minutes. Thanks for letting us see this patient in consultation.   Please contact us with any questions. Office [phone] or after hours through [employer], [phone].

## 2022-12-06 NOTE — PLAN OF CARE
Pt had complaints of nasal soreness with oxygen use this shift- RN applied humidification and issue resolved. Problem: Discharge Planning  Goal: Discharge to home or other facility with appropriate resources  12/6/2022 0132 by Yin Johnston RN  Outcome: Progressing  12/5/2022 1828 by Esther Nova RN  Outcome: Progressing     Problem: ABCDS Injury Assessment  Goal: Absence of physical injury  12/6/2022 0132 by Yin Johnston RN  Outcome: Progressing  Flowsheets (Taken 12/5/2022 2151)  Absence of Physical Injury: Implement safety measures based on patient assessment  12/5/2022 1828 by Esther Nova RN  Outcome: Progressing     Problem: Safety - Adult  Goal: Free from fall injury  12/6/2022 0132 by Yin Johnston RN  Outcome: Mary Jo Sharpe (Taken 12/5/2022 2151)  Free From Fall Injury: Instruct family/caregiver on patient safety  12/5/2022 1828 by Esther Nova RN  Outcome: Progressing     Problem: Pain  Goal: Verbalizes/displays adequate comfort level or baseline comfort level  12/6/2022 0132 by Yin Johnston RN  Outcome: Progressing  [employer] (Taken 12/5/2022 2013)  Verbalizes/displays adequate comfort level or baseline comfort level:   Encourage patient to monitor pain and request assistance   Assess pain using appropriate pain scale  12/5/2022 1828 by Esther Nova RN  Outcome: Progressing     Problem: Chronic Conditions and Co-morbidities  Goal: Patient's chronic conditions and co-morbidity symptoms are monitored and maintained or improved  12/6/2022 0132 by Yin Johnston RN  Outcome: Progressing  12/5/2022 1828 by Esther Nova RN  Outcome: Progressing     Problem: Skin/Tissue Integrity  Goal: Absence of new skin breakdown  Description: 1. Monitor for areas of redness and/or skin breakdown  2. Assess vascular access sites hourly  3. Every 4-6 hours minimum:  Change oxygen saturation probe site  4.   Every 4-6 hours:  If on nasal continuous positive airway pressure, respiratory therapy assess nares and determine need for appliance change or resting period.   12/6/2022 0132 by Louie Borja RN  Outcome: Progressing  12/5/2022 1828 by Jordi Berkowitz RN  Outcome: Progressing

## 2022-12-06 NOTE — CARE COORDINATION
CASE MANAGEMENT. ... Ms Godwin Parra returned from HD. List of dme choices provided. Per previous notes, patient has goes to outpt at [facility] in [facility]. Called and spoke with Mary Jerome [phone] and confirmed patients chair time is 5:45 am q TTS. State she will follow. The Plan for Transition of Care is related to the following treatment goals: dme    The Patient and/or patient representative  was provided with a choice of provider and agrees   with the discharge plan. [x] Yes [] No    Freedom of choice list was provided with basic dialogue that supports the patient's individualized plan of care/goals, treatment preferences and shares the quality data associated with the providers.  [x] Yes [] No

## 2022-12-06 NOTE — FLOWSHEET NOTE
12/06/22 1217   Vital Signs   BP (!) 147/69   Temp 97.3 °F (36.3 °C)   Heart Rate 74   Weight 260 lb 2.3 oz (118 kg)   Percent Weight Change 0.94   Post-Hemodialysis Assessment   Post-Treatment Procedures Blood returned; Access bleeding time > 10 minutes   Machine Disinfection Process Exterior Machine Disinfection   Rinseback Volume (ml) 300 ml   Dialyzer Clearance Lightly streaked   Duration of Treatment (minutes) 240 minutes   Heparin Amount Administered During Treatment (mL) 0 mL   Hemodialysis Intake (ml) 300 ml   Hemodialysis Output (ml) 2300 ml   NET Removed (ml) 2000   Time Off 1149   Patient Disposition Return to room

## 2022-12-06 NOTE — PLAN OF CARE
Problem: Discharge Planning  Goal: Discharge to home or other facility with appropriate resources  12/6/2022 0133 by Robbie Muro RN  Outcome: Progressing  12/6/2022 0132 by Robbie Muro RN  Outcome: Progressing  12/5/2022 1828 by Elissa Marcelino RN  Outcome: Progressing     Problem: ABCDS Injury Assessment  Goal: Absence of physical injury  12/6/2022 0133 by Robbie Muro RN  Outcome: Progressing  12/6/2022 0132 by Robbie Muro RN  Outcome: Progressing  Flowsheets (Taken 12/5/2022 2151)  Absence of Physical Injury: Implement safety measures based on patient assessment  12/5/2022 1828 by Elissa Marcelino RN  Outcome: Progressing     Problem: Safety - Adult  Goal: Free from fall injury  12/6/2022 0133 by Robbie Muro RN  Outcome: Progressing  12/6/2022 0132 by Robbie Muro RN  Outcome: Tanesha Ybarra (Taken 12/5/2022 2151)  Free From Fall Injury: Instruct family/caregiver on patient safety  12/5/2022 1828 by Elissa Marcelino RN  Outcome: Progressing     Problem: Pain  Goal: Verbalizes/displays adequate comfort level or baseline comfort level  12/6/2022 0133 by Robbie Muro RN  Outcome: Progressing  12/6/2022 0132 by Robbie Muro RN  Outcome: Progressing  Flowsheets (Taken 12/5/2022 2013)  Verbalizes/displays adequate comfort level or baseline comfort level:   Encourage patient to monitor pain and request assistance   Assess pain using appropriate pain scale  12/5/2022 1828 by Elissa Marcelino RN  Outcome: Progressing     Problem: Chronic Conditions and Co-morbidities  Goal: Patient's chronic conditions and co-morbidity symptoms are monitored and maintained or improved  12/6/2022 0133 by Robbie Muro RN  Outcome: Progressing  12/6/2022 0132 by Robbie Muro RN  Outcome: Progressing  12/5/2022 1828 by Elissa Marcelino RN  Outcome: Progressing     Problem: Skin/Tissue Integrity  Goal: Absence of new skin breakdown  Description: 1.   Monitor for areas of redness and/or skin breakdown  2. Assess vascular access sites hourly  3. Every 4-6 hours minimum:  Change oxygen saturation probe site  4. Every 4-6 hours:  If on nasal continuous positive airway pressure, respiratory therapy assess nares and determine need for appliance change or resting period.   12/6/2022 0133 by Kimberly Muñoz RN  Outcome: Progressing  12/6/2022 0132 by Kimberly Muñoz RN  Outcome: Progressing  12/5/2022 1828 by Geovanny Taylor RN  Outcome: Progressing

## 2022-12-07 ENCOUNTER — TELEPHONE (OUTPATIENT)
Dept: ENDOCRINOLOGY | Age: 67
End: 2022-12-07

## 2022-12-07 NOTE — DISCHARGE SUMMARY
DISCHARGE SUMMARY  Patient ID:  Mago Patel  [id number]  78 y.o.  1955    Admit date: 12/3/2022      Discharge date : 12/6/2022      Admission Diagnoses: Pulmonary embolism and infarction [facility]) [I26.99]  ESRD needing dialysis ([facility] [N18.6, Z99.2]  Multiple subsegmental pulmonary emboli without acute cor pulmonale (HCC) [I26.94]    Discharge Diagnosis  Principal Problem:    Acute pulmonary embolism ([facility]  Active Problems:    H/O heart artery stent    Pulmonary embolism and infarction ([facility]    Diabetes mellitus ([facility]    ESRD (end stage renal disease) ([facility]    Essential hypertension    Hyperlipidemia    Diabetes mellitus type 2 with complications ([facility]    Pulmonary hypertension, unspecified (HCC)    Obesity, Class III, BMI 40-49.9 (morbid obesity) ([facility]  Resolved Problems:    * No resolved hospital problems. *      Hospital Course:       CHIEF COMPLAINT: Flank pain Beeghly to dialysis, shortness of breath     History of Present Illness: 60-year-old female patient of Dr. Mele Husain am yesterday did not follow. History obtained from patient as well as extensive review of electronic record. Patient is on chronic hemodialysis Tuesdays Thursdays Saturdays at [facility] dialysis center. On her way to dialysis she began experiencing chest pain in the middle of her back and progressed. There was no initial dyspnea but she became dyspneic after arrival.  She states she has not been sedentary has been up and on to go. No recent travel history. In the ED temperature was 97.9. Blood pressure 174/95. Pulse 52 respirations 16 SPO2 99 on room air. She underwent CTA of the chest with following results--             FINDINGS:   Pulmonary Arteries: The main pulmonary arteries are well pacified with IV   contrast.     There is a filling defect seen within the segmental and subsegmental   pulmonary arteries of the right medial basal segment and posterior basal   segment.   There is no pulmonary embolus seen within the pulmonary arteries of   the right middle lobe or right upper lobe. No left pulmonary embolus is noted. Mediastinum: No evidence of mediastinal lymphadenopathy. The heart is   enlarged. There is no pericardial effusion. There is calcified plaque seen   within the coronary arteries. There are no findings of right ventricular   strain. Lungs/pleura: There is a small mild pleural fluid seen within the right major   fissure     Patchy airspace disease seen within the right lung base which could represent   pneumonia or a small pulmonary arm infarct. There is minimal atelectasis seen within the left lung base. Upper Abdomen: Limited images of the upper abdomen are unremarkable. Soft Tissues/Bones:  Age related degenerative changes of the visualized   osseous structures without focal destructive lesion. Impression:       1. Pulmonary embolus seen within the segmental and subsegmental pulmonary   arteries of the medial basal segment and posterior basal segment of the right   lower lobe. 2. There are no findings of right ventricular strain   3. There is no pulmonary embolus seen within the pulmonary arteries of the   left lung   4. Patchy airspace disease within the right lower lobe which could represent   pneumonia or possibly an early pulmonary infarct   5. Linear atelectasis seen within the left lung base   6. Small amount of pleural fluid seen within the right major fissure. She is underwent CT of the abdomen with following results--       Impression:       Trace to small right pleural effusion and adjacent atelectasis at the right   lung base     No acute findings of the abdomen or pelvis. Fat containing infraumbilical   hernia measuring 2.4 cm neck of hernia without associated bowel. -- In the ED hemoglobin 10.1 WBC 11.4. D-dimer elevated 1765. proBNP 31,643. Troponins were slightly elevated 7376.   BUN 40 creatinine 6.0.  Glucose 370.  -- Patient developed diabetes approximately age 49. She had been on metformin never on insulin. She states recently she is taking nothing, watching her diet  --Smoking approximately  quit  1 pack daily 15 years  --Mother  age 62 acute MI, father  age 62 CVA  --Coronary artery disease with PTCA PCI done while living in [place], 1  --Hemodialysis she states 2021; EMR states 2021  --Denies any rheumatic fever scarlet fever polio diphtheria cancer       2022-SUBJECTIVE: Ashley Mullen is alert awake and cooperative; oriented ×3. Denies any dyspnea nausea emesis. Tolerating diet. No abdominal pain. Still having some pleuritic pain mostly in her back area. Currently sitting in bedside chair family present. I discussed at length current A1c of 12.7, need for better control need for endocrinology consult. I also discussed case with pulmonary, if no procedures planned, apixaban will be started earlier rather than later. Currently on heparin drip. Afebrile last 24 hours. Pulse 101. Respirations 20 blood pressure 150/86. SPO2 100 on room air. Intake and output -1400 cc. BUN 21 creatinine 3.7. Ionized calcium low at 0.99. Total calcium low at 7.8. Fasting glucose 302. CRP 29.0 slightly worse; proBNP 29,968, slightly better. LDL 85. Albumin 3.4 liver functions normal.  Hemoglobin 9.8 WBC 11.7. ESR 99. Ferritin 1986; iron slightly low at 25 iron saturation normal TIBC low at 170. A1c elevated at 12.3. Blood cultures in process. Molecular/viral respiratory panel all not detected. Pulmonary consult yesterday appreciated. Continue with IV heparin drip, switch to apixaban when stable. Last evening Tylenol not helping her flank and back pain, fentanyl was added IV as needed. Patient did have hemodialysis yesterday with net removal of 1000 cc. 2D echo has been completed, interpretation pending.      2022-patient sitting quietly in bedside chair; daughter present through the exam.  Pleuritic pains have resolved. No chest pain no dyspnea at rest.  Appetite good. Discussion regarding her 2D echo which is just resulted. EF 55%; she states its been as low as 30 in the past.  I also discussed her apixaban treatment and dosages. All questions answered. Afebrile last 24 hours. Pulse 75 blood pressure 134/54. SPO2 97 on 2 L nasal cannula. Intake and output -1160 cc. BUN 35 creatinine 5.4 magnesium 2.2 fasting glucose 192 CRP 30.4. Albumin 3.3. Hemoglobin 9.0 WBC 9.1. . CEA normal at 3.0; CA-125 normal at 19.9. Alpha-fetoprotein tumor marker normal at 1. Blood cultures negative to date; urine culture no growth incubation continues. Legionella strep antigen presumptive negative. Pulmonary note from yesterday appreciated. Ambulating and tolerating. Stop IV heparin and begin apixaban. Continue O2 taper as tolerated need to see if she qualifies for home use. Out of bed to chair. Cardiology consult from today appreciated. Patient with history of heart murmur and markedly elevated proBNP. Longstanding coronary artery disease, 2015, 3 stents placed separate occasions [facility]. Based on clinical presentation aggressive therapy to determine etiology of pulmonary embolism. Consider echocardiogram every other year to monitor progression of aortic valve disease. Pulmonary note from today appreciated. Continue apixaban 10 mg twice daily and then maintenance dose. Need to clarify if patient qualifies for home O2.  2D echo completed with following results--     Summary   Left ventricle grossly normal in size. Mild left ventricular concentric hypertrophy noted. Normal LV segmental wall motion. Estimated left ventricular ejection fraction is 58±5%. Does not meet 50% diagnostic criteria for diastolic dysfunction. The LAESV Index is <34ml/m2. Borderline dilated right ventricle. TAPSE is normal   Mild aortic stenosis is present.    Aortic valve dimensionless valve index . 41   Physiologic and/or trace tricuspid regurgitation. RVSP is 39 mmHg. Technically fair quality study. Technically difficult study due to patient''s body habitus. No comparison study available. Suggest clinical correlation. Await endocrinology consult. Obtain ambulatory pulse oximetry. 12/6/2022-patient sitting in bedside chair; no chest pain no dyspnea. Still having troubles weaning from oxygen. Daughter present through the exam.  Chest x-ray about to be done. She is hoping for discharge today; has been cleared for discharge by cardiology nephrology; pulmonary is awaiting results of chest x-ray; awaiting endocrinology for discharge. Afebrile last 24 hours. Pulse 76 blood pressure 148/72. SPO2 98 on 2 L nasal cannula. Patient dropped to 87 SPO2 while on room air last evening. Intake and output -2985 cc. BUN 48 creatinine 6.9. Fasting glucose 335 calcium 7.9 phosphorus 5.5. CRP 19.9, improving. Albumin 3.3 WBC 9.5 hemoglobin 8.8. . Endocrinology consult from yesterday appreciated. Changes were made with Lantus 12 units nightly, Humalog 6 units 3 times daily with meals, low-dose sliding scale insulin. Arrange to be seen in clinic upon discharge for maintenance. Stool for occult blood was negative. Cardiology note from today appreciated.  note from today, patient will likely require oxygen at home. Patient underwent hemodialysis today with 2000 cc net removal.  Physical therapy AMPAC score from today 17/24. Pulmonary note from today, sitting up in chair slight increase in cough. Continue with Eliquis taper within maintenance. Continue oxygen taper may need for home use. Saline nasal spray. Check chest x-ray today to make sure there is no changes. Possible discharge once above is clarified. Hospital orders:     PLAN:  Medications discussed with patient  GI prophylaxis  DVT prophylaxis  Consultants notes reviewed  Aspirin 81 mg daily  PhosLo 667, 1 capsule 3 times daily  Enoxaparin 1 mg/kg subcu  Low-dose sliding scale insulin  She received 1 dose of Norflex 60 mg IV once  Hemodialysis 3 times a week  Ultrasound Doppler of lower extremities without evidence of DVT  Tumor markers in view of unprovoked pulmonary embolus  For carb low-fat low-salt diet  Consult to nephrology  Consult to pulmonary  2D echo      RE  CHANGES AND FINDINGS   Medications reviewed with patient  GI prophylaxis  DVT prophylaxis  Consultants notes reviewed    Aspirin 81 mg daily  PhosLo 667, 1 capsule 3 times daily  Low-dose sliding scale insulin  Protonix 40 mg daily  Heparin drip IV  Acetaminophen 650 mg every 8 hours as needed for pain  Fentanyl 50 mcg IV every 6 hours as needed for pain  Endocrinology consult regarding poorly controlled diabetes; patient had stopped metformin and was controlling her glucose with \"diet\". Conversion to apixaban when okay with pulmonary  12/5/2022  Heparin drip has been discontinued  Apixaban 10 mg twice daily x7 days then 5 mg twice daily  Ambulatory pulse oximetry  Patient refused atorvastatin  Lantus 5 units subcu twice daily  Low-dose sliding scale insulin  Humalog 3 units 3 times daily with meals  Await endocrinology consult  Hemodialysis in a.m.     Instructions at discharge:    12/6/2022  Med reconciliation completed  Prescriptions written  Apixaban 10 mg twice daily for 7 days then 5 mg twice daily  Defer insulin orders to endocrinology  Aspirin 81 milligrams daily  Atorvastatin 40 mg daily, patient declining  PhosLo 667, 3 times daily  Follow-up Dr. Covington President 1 week  Follow-up nephrology per their instructions  Follow-up pulmonary per their instructions  Follow-up endocrinology per their instructions    Condition at DISCHARGE : Jude 1878     Discharged to: Home with oxygen    Discharge Instructions: Medications reviewed with patient    Consults:cardiology, pulmonary/intensive care, nephrology, and endocrinology     Past Medical Hx :   Past Medical History:   Diagnosis Date    Acute pulmonary embolism ([address] 12/03/2022    Anemia in CKD (chronic kidney disease) 11/30/2021    Anxiety and depression 01/19/2022    At high risk for falls 01/19/2022    Brain aneurysm     CLABSI (central line-associated bloodstream infection) 11/19/2021    Cough 01/19/2022    Diabetes mellitus ([address] 2006    Diabetes mellitus type 2 with complications ([address] 28/11/4860    Dizziness on standing 01/19/2022    ESRD (end stage renal disease) ([address] 11/19/2021    ESRD on hemodialysis ([address] 11/19/2021    Essential hypertension 11/30/2021    Fever, unspecified     Gastrointestinal hemorrhage 11/30/2021    H/O heart artery stent 2015    Done in [place]    History of Clostridioides difficile colitis 01/19/2022    Hyperlipidemia     Hypertension     MSSA bacteremia 11/18/2021    Nausea & vomiting 11/18/2021    Obesity, Class III, BMI 40-49.9 (morbid obesity) ([address] 62/00/9767    Periumbilical abdominal pain        Past Surgical Hx :  Past Surgical History:   Procedure Laterality Date    CARDIAC CATHETERIZATION  2015    Done in [address] [place] [address]  2017    Negative findings    DIALYSIS FISTULA CREATION Left 01/28/2022    REVISION AV FISTULA LEFT ARM performed by Leanord Sacks, MD at [address]    PTCA  2015    PTCA [facility] N/A 12/01/2021    EGD BIOPSY performed by Eri Thrasher MD at [address] [place] [address] N/A 11/24/2021    CATHETER INSERTION  TUNNELED HEMODIALYSIS, WITH REMOVAL OF TEMPORARY CATHETER performed by Leanord Sacks, MD at [address] [place] [address] Rd:   CBC:   Lab Results   Component Value Date/Time    WBC 9.5 12/06/2022 02:47 AM    RBC 2.74 12/06/2022 02:47 AM    HGB 8.8 12/06/2022 02:47 AM    HCT 27.6 12/06/2022 02:47 AM    .7 12/06/2022 02:47 AM    MCH 32.1 12/06/2022 02:47 AM    MCHC 31.9 12/06/2022 02:47 AM    RDW 13.6 12/06/2022 02:47 AM     12/06/2022 02:47 AM    MPV 10.0 12/06/2022 02:47 AM     CBC with Differential:    Lab Results   Component Value Date/Time    WBC 9.5 12/06/2022 02:47 AM    RBC 2.74 12/06/2022 02:47 AM    HGB 8.8 12/06/2022 02:47 AM    HCT 27.6 12/06/2022 02:47 AM     12/06/2022 02:47 AM    .7 12/06/2022 02:47 AM    MCH 32.1 12/06/2022 02:47 AM    MCHC 31.9 12/06/2022 02:47 AM    RDW 13.6 12/06/2022 02:47 AM    NRBC 0.0 01/13/2022 04:38 AM    LYMPHOPCT 10.7 12/06/2022 02:47 AM    MONOPCT 8.1 12/06/2022 02:47 AM    BASOPCT 0.4 12/06/2022 02:47 AM    MONOSABS 0.77 12/06/2022 02:47 AM    LYMPHSABS 1.02 12/06/2022 02:47 AM    EOSABS 0.43 12/06/2022 02:47 AM    BASOSABS 0.04 12/06/2022 02:47 AM     Hemoglobin/Hematocrit:    Lab Results   Component Value Date/Time    HGB 8.8 12/06/2022 02:47 AM    HCT 27.6 12/06/2022 02:47 AM     CMP:    Lab Results   Component Value Date/Time     12/06/2022 02:47 AM    K 4.9 12/06/2022 02:47 AM    K 4.2 08/23/2022 12:12 PM    CL 92 12/06/2022 02:47 AM    CO2 26 12/06/2022 02:47 AM    BUN 48 12/06/2022 02:47 AM    CREATININE 6.9 12/06/2022 02:47 AM    GFRAA 8 09/22/2022 12:14 PM    LABGLOM 6 12/06/2022 02:47 AM    GLUCOSE 335 12/06/2022 02:47 AM    PROT 6.4 12/06/2022 02:47 AM    LABALBU 3.3 12/06/2022 02:47 AM    CALCIUM 7.9 12/06/2022 02:47 AM    BILITOT 0.2 12/06/2022 02:47 AM    ALKPHOS 87 12/06/2022 02:47 AM    AST 9 12/06/2022 02:47 AM    ALT 8 12/06/2022 02:47 AM     BMP:    Lab Results   Component Value Date/Time     12/06/2022 02:47 AM    K 4.9 12/06/2022 02:47 AM    K 4.2 08/23/2022 12:12 PM    CL 92 12/06/2022 02:47 AM    CO2 26 12/06/2022 02:47 AM    BUN 48 12/06/2022 02:47 AM    LABALBU 3.3 12/06/2022 02:47 AM    CREATININE 6.9 12/06/2022 02:47 AM    CALCIUM 7.9 12/06/2022 02:47 AM    GFRAA 8 09/22/2022 12:14 PM    LABGLOM 6 12/06/2022 02:47 AM    GLUCOSE 335 12/06/2022 02:47 AM     Hepatic Function Panel:    Lab Results   Component Value Date/Time    ALKPHOS 87 12/06/2022 02:47 AM    ALT 8 12/06/2022 02:47 AM    AST 9 12/06/2022 02:47 AM    PROT 6.4 12/06/2022 02:47 AM    BILITOT 0.2 12/06/2022 02:47 AM    BILIDIR <0.2 12/06/2022 02:47 AM    IBILI see below 12/06/2022 02:47 AM    LABALBU 3.3 12/06/2022 02:47 AM     Ionized Calcium:  No results found for: IONCA  Magnesium:    Lab Results   Component Value Date/Time    MG 2.2 12/06/2022 02:47 AM     Phosphorus:    Lab Results   Component Value Date/Time    PHOS 5.5 12/06/2022 02:47 AM     LDH:    Lab Results   Component Value Date/Time     01/11/2022 12:45 PM     Uric Acid:    Lab Results   Component Value Date/Time    LABURIC 3.3 12/04/2022 05:30 AM     PT/INR:    Lab Results   Component Value Date/Time    PROTIME 11.0 08/23/2022 12:12 PM    INR 1.0 08/23/2022 12:12 PM     Warfarin PT/INR:  No components found for: PTPATWAR, PTINRWAR  PTT:    Lab Results   Component Value Date/Time    APTT 136.2 12/04/2022 09:18 AM   [APTT}  Troponin:  No results found for: TROPONINI  Last 3 Troponin:  No results found for: TROPONINI  U/A:    Lab Results   Component Value Date/Time    COLORU Yellow 11/18/2021 03:19 AM    PROTEINU >=300 11/18/2021 03:19 AM    PHUR 6.0 11/18/2021 03:19 AM    WBCUA 1-3 11/18/2021 03:19 AM    RBCUA 0-1 11/18/2021 03:19 AM    BACTERIA FEW 11/18/2021 03:19 AM    CLARITYU Clear 11/18/2021 03:19 AM    SPECGRAV 1.020 11/18/2021 03:19 AM    LEUKOCYTESUR Negative 11/18/2021 03:19 AM    UROBILINOGEN 0.2 11/18/2021 03:19 AM    BILIRUBINUR Negative 11/18/2021 03:19 AM    BLOODU SMALL 11/18/2021 03:19 AM    GLUCOSEU 500 11/18/2021 03:19 AM     ABG:  No results found for: PH, PCO2, PO2, HCO3, BE, THGB, TCO2, O2SAT  HgBA1c:    Lab Results   Component Value Date/Time    LABA1C 12.7 12/03/2022 12:09 PM     FLP:    Lab Results   Component Value Date/Time    TRIG 90 12/04/2022 05:30 AM    HDL 52 12/04/2022 05:30 AM    LDLCALC 85 12/04/2022 05:30 AM LABVLDL 18 12/04/2022 05:30 AM     TSH:    Lab Results   Component Value Date/Time    TSH 1.470 12/03/2022 12:09 PM     VITAMIN B12: No components found for: B12  FOLATE:  No results found for: FOLATE  IRON:    Lab Results   Component Value Date/Time    IRON 25 12/04/2022 05:30 AM     Iron Saturation:  No components found for: PERCENTFE  TIBC:    Lab Results   Component Value Date/Time    TIBC 170 12/04/2022 05:30 AM     FERRITIN:    Lab Results   Component Value Date/Time    FERRITIN 1,986 12/04/2022 05:30 AM          CBC:  Recent Labs     12/05/22 0310 12/06/22 0247   WBC 9.1 9.5   RBC 2.74* 2.74*   HGB 9.0* 8.8*   HCT 28.0* 27.6*    212   .2* 100.7*   MCH 32.8 32.1   MCHC 32.1 31.9*   RDW 13.6 13.6   LYMPHOPCT 12.9* 10.7*   MONOPCT 9.1 8.1   BASOPCT 0.4 0.4   MONOSABS 0.83 0.77   LYMPHSABS 1.18* 1.02*   EOSABS 0.33 0.43   BASOSABS 0.04 0.04          H & H :  Recent Labs     12/05/22 0310 12/06/22 0247   HGB 9.0* 8.8*       TSH:No results for input(s): TSH in the last 72 hours. GLUCOSE:No results for input(s): POCGLU in the last 72 hours. CMP:  Recent Labs     12/05/22 0310 12/06/22 0247    133   K 4.3 4.9   CL 94* 92*   CO2 28 26   BUN 35* 48*   CREATININE 5.4* 6.9*   LABGLOM 8 6   GLUCOSE 192* 335*   PROT 6.5 6.4   LABALBU 3.3* 3.3*   CALCIUM 8.0* 7.9*   BILITOT 0.2 0.2   ALKPHOS 81 87   AST 11 9   ALT 8 8         BNP:No results found for: BNP    PROTIME/INR:No results for input(s): PROTIME, INR in the last 72 hours. CRP:   Recent Labs     12/05/22  0310 12/06/22  0247   CRP 30.4* 19.9*       ESR:  Recent Labs     12/05/22  0310 12/06/22  0247   SEDRATE 124* 121*       LIPASE , AMYLASE:  Lab Results   Component Value Date    LIPASE 112 (H) 08/23/2022      No results found for: AMYLASE    ABGs: No results found for: PHART, PO2ART, YAC0AUQ    CARDIAC: No results for input(s): CKTOTAL, CKMB, CKMBINDEX, TROPONINI in the last 72 hours.     Lipid Profile:   Lab Results   Component Value Date/Time    TRIG 90 12/04/2022 05:30 AM    HDL 52 12/04/2022 05:30 AM    LDLCALC 85 12/04/2022 05:30 AM    CHOL 155 12/04/2022 05:30 AM        CT ABDOMEN PELVIS W IV CONTRAST Additional Contrast? None    Result Date: 12/3/2022  EXAMINATION: CT OF THE ABDOMEN AND PELVIS WITH CONTRAST 12/3/2022 7:53 am TECHNIQUE: CT of the abdomen and pelvis was performed with the administration of intravenous contrast. Multiplanar reformatted images are provided for review. Automated exposure control, iterative reconstruction, and/or weight based adjustment of the mA/kV was utilized to reduce the radiation dose to as low as reasonably achievable. COMPARISON: CT dated 08/23/2022 HISTORY: ORDERING SYSTEM PROVIDED HISTORY: Right abdominal pain TECHNOLOGIST PROVIDED HISTORY: Additional Contrast?->None Reason for exam:->Right abdominal pain Decision Support Exception - unselect if not a suspected or confirmed emergency medical condition->Emergency Medical Condition (MA) FINDINGS: Lower Chest: Lung bases reveal subsegmental atelectasis with trace volume right pleural effusion Organs: Liver without focal lesion. Gallbladder unremarkable. Pancreas and spleen unremarkable. Adrenals without nodule. Kidneys without suspicious renal lesion and no hydronephrosis. GI/Bowel: No focal thickening or disproportion dilatation of bowel. No inflammatory findings. Pelvis: No suspicious pelvic lesion or bulky pelvic adenopathy/free fluid. Peritoneum/Retroperitoneum: No bulky retroperitoneal adenopathy. No suspicious peritoneal or mesenteric process Vasculature: Grossly normal caliber of abdominal aorta and vasculature Bones/Soft Tissues: No acute osseous or soft tissue findings. Fat containing infraumbilical hernia 2.4 cm neck of hernia without associated bowel. Trace to small right pleural effusion and adjacent atelectasis at the right lung base No acute findings of the abdomen or pelvis.  Fat containing infraumbilical hernia measuring 2.4 cm neck of hernia without associated bowel. XR CHEST PORTABLE    Result Date: 12/6/2022  EXAMINATION: ONE XRAY VIEW OF THE CHEST 12/6/2022 3:00 pm COMPARISON: 09/22/2022 HISTORY: ORDERING SYSTEM PROVIDED HISTORY: cough, PE TECHNOLOGIST PROVIDED HISTORY: Reason for exam:->cough, PE FINDINGS: The lungs are without acute focal process. There is no effusion or pneumothorax. Cardiomegaly. The osseous structures are without acute process. No acute process. Cardiomegaly. CTA PULMONARY W CONTRAST    Result Date: 12/3/2022  EXAMINATION: CTA OF THE CHEST 12/3/2022 7:53 am TECHNIQUE: CTA of the chest was performed after the administration of intravenous contrast.  Multiplanar reformatted images are provided for review. MIP images are provided for review. Automated exposure control, iterative reconstruction, and/or weight based adjustment of the mA/kV was utilized to reduce the radiation dose to as low as reasonably achievable. COMPARISON: None. HISTORY: ORDERING SYSTEM PROVIDED HISTORY: chest pain, hypoxia, eval PE TECHNOLOGIST PROVIDED HISTORY: Reason for exam:->chest pain, hypoxia, eval PE Decision Support Exception - unselect if not a suspected or confirmed emergency medical condition->Emergency Medical Condition (MA) FINDINGS: Pulmonary Arteries: The main pulmonary arteries are well pacified with IV contrast. There is a filling defect seen within the segmental and subsegmental pulmonary arteries of the right medial basal segment and posterior basal segment. There is no pulmonary embolus seen within the pulmonary arteries of the right middle lobe or right upper lobe. No left pulmonary embolus is noted. Mediastinum: No evidence of mediastinal lymphadenopathy. The heart is enlarged. There is no pericardial effusion. There is calcified plaque seen within the coronary arteries. There are no findings of right ventricular strain. Lungs/pleura:  There is a small mild pleural fluid seen within the right major fissure Patchy airspace disease seen within the right lung base which could represent pneumonia or a small pulmonary arm infarct. There is minimal atelectasis seen within the left lung base. Upper Abdomen: Limited images of the upper abdomen are unremarkable. Soft Tissues/Bones:  Age related degenerative changes of the visualized osseous structures without focal destructive lesion. 1. Pulmonary embolus seen within the segmental and subsegmental pulmonary arteries of the medial basal segment and posterior basal segment of the right lower lobe. 2. There are no findings of right ventricular strain 3. There is no pulmonary embolus seen within the pulmonary arteries of the left lung 4. Patchy airspace disease within the right lower lobe which could represent pneumonia or possibly an early pulmonary infarct 5. Linear atelectasis seen within the left lung base 6. Small amount of pleural fluid seen within the right major fissure. US DUP LOWER EXTREMITIES BILATERAL VENOUS    Result Date: 12/3/2022  EXAMINATION: DUPLEX VENOUS ULTRASOUND OF THE BILATERAL LOWER [id number]/3/2022 10:52 am TECHNIQUE: Duplex ultrasound using B-mode/gray scaled imaging, Doppler spectral analysis and color flow Doppler was obtained of the deep venous structures of the lower bilateral extremities. COMPARISON: None. HISTORY: ORDERING SYSTEM PROVIDED HISTORY: DVT TECHNOLOGIST PROVIDED HISTORY: Reason for exam:->DVT What reading provider will be dictating this exam?->CRC FINDINGS: The visualized veins of the bilateral lower extremities are patent and free of echogenic thrombus. The veins demonstrate good compressibility with normal color flow study and spectral analysis. No evidence of DVT in either lower extremity.        Discharge Exam:  See progress note from today    Discharge Medication List as of 12/6/2022  5:49 PM        START taking these medications    Details   !! apixaban (ELIQUIS) 5 MG TABS tablet Take 2 tablets by mouth 2 times daily for 10 doses, Disp-60 tablet, R-1Normal      !! apixaban (ELIQUIS) 5 MG TABS tablet Take 1 tablet by mouth 2 times daily, Disp-60 tablet, R-3Normal      rosuvastatin (CRESTOR) 5 MG tablet Take 1 tablet by mouth daily, Disp-30 tablet, R-3Normal      sodium chloride (OCEAN, BABY AYR) 0.65 % nasal spray 1 spray by Nasal route in the morning and 1 spray in the evening., Disp-1 each, R-0Normal      glucose 4 g chewable tablet Take 4 tablets by mouth as needed for Low blood sugar, Disp-60 tablet, R-3Normal      insulin glargine (LANTUS SOLOSTAR) 100 UNIT/ML injection pen Inject 24 Units into the skin nightly, Disp-10 Adjustable Dose Pre-filled Pen Syringe, R-3Normal      insulin lispro, 1 Unit Dial, (HUMALOG KWIKPEN) 100 UNIT/ML SOPN Inject 8 units with meals + following sliding scale. -200 add 2U, -250 add 4U, -300 add 6U, -350 add 8U, -400 add 10U, BS over 400 add 12U, Disp-10 Adjustable Dose Pre-filled Pen Syringe, R-4Normal      Blood Glucose Monitoring Suppl MISC Disp-1 each, R-0, NormalOneTouch ultra Glucometer      ONE TOUCH ULTRASOFT LANCETS MISC Disp-250 each, R-5, NormalTest 4 times/day before meals and at bedtime and as needed for symptoms of irregular blood glucose. blood glucose monitor strips One-Touch Ultra strips. Check 4 times/day before meals and at bedtime and as needed for symptoms of irregular blood glucose, Disp-250 strip, R-5, Normal      Insulin Pen Needle (BD PEN NEEDLE MICRO U/F) 32G X 6 MM MISC Uses with insulin 4 times a day, Disp-250 each, R-5Normal       !! - Potential duplicate medications found. Please discuss with provider.         CONTINUE these medications which have NOT CHANGED    Details   aspirin 81 MG EC tablet Take 81 mg by mouth dailyHistorical Med      acetaminophen (TYLENOL) 650 MG extended release tablet Take 650 mg by mouth every 8 hours as needed for PainHistorical Med      midodrine (PROAMATINE) 10 MG tablet Take 10 mg by mouth

## 2022-12-07 NOTE — PROGRESS NOTES
CLINICAL PHARMACY NOTE: MEDS TO BEDS    Total # of Prescriptions Filled: 2   The following medications were delivered to the patient:  ELIQUIS 5 MG  CRESTOR 5 MG    Additional Documentation:

## 2022-12-08 LAB
BLOOD CULTURE, ROUTINE: NORMAL
CULTURE, BLOOD 2: NORMAL

## 2022-12-13 ENCOUNTER — APPOINTMENT (OUTPATIENT)
Dept: GENERAL RADIOLOGY | Age: 67
End: 2022-12-13
Payer: MEDICARE

## 2022-12-13 ENCOUNTER — HOSPITAL ENCOUNTER (EMERGENCY)
Age: 67
Discharge: HOME | End: 2022-12-13
Attending: EMERGENCY MEDICINE
Payer: MEDICARE

## 2022-12-13 VITALS
HEART RATE: 63 BPM | WEIGHT: 250 LBS | BODY MASS INDEX: 42.68 KG/M2 | HEIGHT: 64 IN | DIASTOLIC BLOOD PRESSURE: 75 MMHG | SYSTOLIC BLOOD PRESSURE: 156 MMHG | OXYGEN SATURATION: 100 % | TEMPERATURE: 97.6 F | RESPIRATION RATE: 16 BRPM

## 2022-12-13 DIAGNOSIS — Z79.01 CHRONIC ANTICOAGULATION: ICD-10-CM

## 2022-12-13 DIAGNOSIS — Z99.2 ESRD ON DIALYSIS (HCC): ICD-10-CM

## 2022-12-13 DIAGNOSIS — I26.99 OTHER PULMONARY EMBOLISM WITHOUT ACUTE COR PULMONALE, UNSPECIFIED CHRONICITY (HCC): ICD-10-CM

## 2022-12-13 DIAGNOSIS — M54.9 UPPER BACK PAIN: Primary | ICD-10-CM

## 2022-12-13 DIAGNOSIS — N18.6 ESRD ON DIALYSIS (HCC): ICD-10-CM

## 2022-12-13 LAB
ANION GAP SERPL CALCULATED.3IONS-SCNC: 15 MMOL/L (ref 7–16)
BASOPHILS ABSOLUTE: 0.02 E9/L (ref 0–0.2)
BASOPHILS RELATIVE PERCENT: 0.2 % (ref 0–2)
BUN BLDV-MCNC: 53 MG/DL (ref 6–23)
CALCIUM SERPL-MCNC: 9 MG/DL (ref 8.6–10.2)
CHLORIDE BLD-SCNC: 92 MMOL/L (ref 98–107)
CO2: 29 MMOL/L (ref 22–29)
CREAT SERPL-MCNC: 6.7 MG/DL (ref 0.5–1)
EKG ATRIAL RATE: 65 BPM
EKG P AXIS: 52 DEGREES
EKG P-R INTERVAL: 148 MS
EKG Q-T INTERVAL: 474 MS
EKG QRS DURATION: 100 MS
EKG QTC CALCULATION (BAZETT): 492 MS
EKG R AXIS: -10 DEGREES
EKG T AXIS: 68 DEGREES
EKG VENTRICULAR RATE: 65 BPM
EOSINOPHILS ABSOLUTE: 0.46 E9/L (ref 0.05–0.5)
EOSINOPHILS RELATIVE PERCENT: 4.6 % (ref 0–6)
GFR SERPL CREATININE-BSD FRML MDRD: 6 ML/MIN/1.73
GLUCOSE BLD-MCNC: 323 MG/DL (ref 74–99)
HCT VFR BLD CALC: 30.5 % (ref 34–48)
HEMOGLOBIN: 10.1 G/DL (ref 11.5–15.5)
IMMATURE GRANULOCYTES #: 0.05 E9/L
IMMATURE GRANULOCYTES %: 0.5 % (ref 0–5)
LYMPHOCYTES ABSOLUTE: 1.12 E9/L (ref 1.5–4)
LYMPHOCYTES RELATIVE PERCENT: 11.3 % (ref 20–42)
MCH RBC QN AUTO: 32.7 PG (ref 26–35)
MCHC RBC AUTO-ENTMCNC: 33.1 % (ref 32–34.5)
MCV RBC AUTO: 98.7 FL (ref 80–99.9)
MONOCYTES ABSOLUTE: 0.52 E9/L (ref 0.1–0.95)
MONOCYTES RELATIVE PERCENT: 5.2 % (ref 2–12)
NEUTROPHILS ABSOLUTE: 7.78 E9/L (ref 1.8–7.3)
NEUTROPHILS RELATIVE PERCENT: 78.2 % (ref 43–80)
PDW BLD-RTO: 12.9 FL (ref 11.5–15)
PLATELET # BLD: 261 E9/L (ref 130–450)
PMV BLD AUTO: 9.9 FL (ref 7–12)
POTASSIUM SERPL-SCNC: 4.8 MMOL/L (ref 3.5–5)
RBC # BLD: 3.09 E12/L (ref 3.5–5.5)
SODIUM BLD-SCNC: 136 MMOL/L (ref 132–146)
TROPONIN, HIGH SENSITIVITY: 67 NG/L (ref 0–9)
WBC # BLD: 10 E9/L (ref 4.5–11.5)

## 2022-12-13 PROCEDURE — 85025 COMPLETE CBC W/AUTO DIFF WBC: CPT

## 2022-12-13 PROCEDURE — 36415 COLL VENOUS BLD VENIPUNCTURE: CPT

## 2022-12-13 PROCEDURE — 80074 ACUTE HEPATITIS PANEL: CPT

## 2022-12-13 PROCEDURE — 84484 ASSAY OF TROPONIN QUANT: CPT

## 2022-12-13 PROCEDURE — 6370000000 HC RX 637 (ALT 250 FOR IP): Performed by: STUDENT IN AN ORGANIZED HEALTH CARE EDUCATION/TRAINING PROGRAM

## 2022-12-13 PROCEDURE — 90935 HEMODIALYSIS ONE EVALUATION: CPT

## 2022-12-13 PROCEDURE — 71045 X-RAY EXAM CHEST 1 VIEW: CPT

## 2022-12-13 PROCEDURE — 93005 ELECTROCARDIOGRAM TRACING: CPT | Performed by: STUDENT IN AN ORGANIZED HEALTH CARE EDUCATION/TRAINING PROGRAM

## 2022-12-13 PROCEDURE — 99285 EMERGENCY DEPT VISIT HI MDM: CPT

## 2022-12-13 PROCEDURE — 86706 HEP B SURFACE ANTIBODY: CPT

## 2022-12-13 PROCEDURE — 93010 ELECTROCARDIOGRAM REPORT: CPT | Performed by: INTERNAL MEDICINE

## 2022-12-13 PROCEDURE — 80048 BASIC METABOLIC PNL TOTAL CA: CPT

## 2022-12-13 RX ORDER — HYDROCODONE BITARTRATE AND ACETAMINOPHEN 5; 325 MG/1; MG/1
1 TABLET ORAL ONCE
Status: COMPLETED | OUTPATIENT
Start: 2022-12-13 | End: 2022-12-13

## 2022-12-13 RX ADMIN — HYDROCODONE BITARTRATE AND ACETAMINOPHEN 1 TABLET: 5; 325 TABLET ORAL at 09:33

## 2022-12-13 ASSESSMENT — ENCOUNTER SYMPTOMS
BACK PAIN: 1
ABDOMINAL PAIN: 0
VOMITING: 0
SHORTNESS OF BREATH: 0
COUGH: 0
RHINORRHEA: 0
NAUSEA: 0
DIARRHEA: 0

## 2022-12-13 NOTE — ED PROVIDER NOTES
Patient is 27-year-old female with recent diagnosis of pulmonary embolism on Eliquis who complains of right-sided back pain. Patient states that she has been asymptomatic since her diagnosis 10 days ago, has been compliant with her Eliquis, complains of a pain that goes down her right back, moderate intensity, not otherwise radiating, nothing makes it better, palpation and deep inspiration make it worse, constant. Patient states onset last night progressive through last night into today. Patient has not taken any OTC or analgesia prior to arrival.  Patient denies any other chest pain, shortness of breath, abdominal pain. Patient denies any fevers or chills. Patient has been asymptomatic until last night. She states that the pain is similar to that of her PE however is less intense. Patient denies any nausea or vomiting, denies any trauma. Pain is reproducible. Review of Systems   Constitutional:  Negative for chills and fever. HENT:  Negative for congestion and rhinorrhea. Eyes:  Negative for visual disturbance. Respiratory:  Negative for cough and shortness of breath. Cardiovascular:  Positive for chest pain. Negative for palpitations and leg swelling. Gastrointestinal:  Negative for abdominal pain, diarrhea, nausea and vomiting. Endocrine: Negative for polyuria. Genitourinary:  Negative for dysuria and hematuria. Musculoskeletal:  Positive for back pain. Negative for arthralgias and myalgias. Skin:  Negative for rash and wound. Allergic/Immunologic: Negative for immunocompromised state. Neurological:  Negative for dizziness, syncope, weakness, light-headedness, numbness and headaches. Psychiatric/Behavioral:  Negative for confusion. The patient is not nervous/anxious. Physical Exam  Vitals and nursing note reviewed. Constitutional:       General: She is not in acute distress. Appearance: She is not ill-appearing. HENT:      Head: Normocephalic and atraumatic. Mouth/Throat:      Mouth: Mucous membranes are moist.   Eyes:      Pupils: Pupils are equal, round, and reactive to light. Cardiovascular:      Rate and Rhythm: Normal rate and regular rhythm. Pulses: Normal pulses. Heart sounds: Normal heart sounds. Pulmonary:      Effort: Pulmonary effort is normal.      Breath sounds: Normal breath sounds. Comments: Chest pain significantly increases with deep inspiration. Abdominal:      Palpations: Abdomen is soft. Tenderness: There is no abdominal tenderness. Musculoskeletal:         General: Normal range of motion. Cervical back: Normal range of motion. Thoracic back: Tenderness present. Comments: Patient with tenderness to the right back. Skin:     General: Skin is warm and dry. Capillary Refill: Capillary refill takes less than 2 seconds. Neurological:      General: No focal deficit present. Mental Status: She is alert and oriented to person, place, and time. MDM  Number of Diagnoses or Management Options  Chronic anticoagulation  ESRD on dialysis [facility])  Other pulmonary embolism without acute cor pulmonale, unspecified chronicity ([address]  Upper back pain  Diagnosis management comments: Patient is a 78-year-old female with a recent diagnosis of PE, is on Eliquis who presents to the emergency department with pain on deep inspiration and palpation to the right back. Patient states since her diagnosis 10 days ago she has been feeling well until last night when she started helping pain patient denies any fevers or chills. Patient presents awake, alert, following all commands, no distress. Vital signs were noted. Physical exam shows reproducible pain to the back as well as pain on deep inspiration. Work-up including labs are reassuring, EKG was reviewed. Troponin is elevated however below her baseline. Chest x-ray does not show any acute abnormalities. Patient was given Norco with relief.   Patient is on dialysis, missed her dialysis today, nephrology was consulted and dialysis was arranged for today. Patient received dialysis, will be returned to the emergency department and with no other significant abnormalities patient may be discharged. Will be reevaluated at that time. Amount and/or Complexity of Data Reviewed  Clinical lab tests: ordered and reviewed  Tests in the radiology section of CPT®: ordered and reviewed      ED Course as of 12/14/22 2256   Tue Dec 13, 2022   1130 Spoke with Dr. Linda Ramos, he will arrange for dialysis today at [facility]. [QC]      ED Course User Index  [QC] Phill Guillaume MD        EKG: This EKG is signed and interpreted by me. Rate: 65  Rhythm: Sinus  Interpretation: sinus rhythm, normal ME interval, normal QRS, prolonged QT interval, no acute ST or T wave changes  Comparison: stable as compared to patient's most recent EKG     ED Course as of 12/14/22 2256   Tue Dec 13, 2022   1130 Spoke with Dr. Linda Ramos, he will arrange for dialysis today at [facility]. [QC]      ED Course User Index  [QC] Phill Guillaume MD       --------------------------------------------- PAST HISTORY ---------------------------------------------  Past Medical History:  has a past medical history of Acute pulmonary embolism ([facility] Anemia in CKD (chronic kidney disease), Anxiety and depression, At high risk for falls, Brain aneurysm, CLABSI (central line-associated bloodstream infection), Cough, Diabetes mellitus ([address] Diabetes mellitus type 2 with complications ([address] Dizziness on standing, ESRD (end stage renal disease) ([facility] ESRD on hemodialysis ([facility] Essential hypertension, Fever, unspecified, Gastrointestinal hemorrhage, H/O heart artery stent, History of Clostridioides difficile colitis, Hyperlipidemia, Hypertension, MSSA bacteremia, Nausea & vomiting, Obesity, Class III, BMI 40-49.9 (morbid obesity) ([address] and Periumbilical abdominal pain.     Past Surgical History: has a past surgical history that includes Cardiac surgery;  section; vascular surgery (N/A, 2021); Upper gastrointestinal endoscopy (N/A, 2021); Dialysis fistula creation (Left, 2022); Colonoscopy (2017); Cardiac catheterization (2015); and Percutaneous Transluminal Coronary Angio (). Social History:  reports that she quit smoking about 36 years ago. Her smoking use included cigarettes. She started smoking about 48 years ago. She has a 12.00 pack-year smoking history. She has never used smokeless tobacco. She reports that she does not drink alcohol and does not use drugs. Family History: family history includes Coronary Art Dis in her brother, father, and mother. The patients home medications have been reviewed.     Allergies: Benzonatate, Morphine, Pcn [penicillins], and Sodium hypochlorite    -------------------------------------------------- RESULTS -------------------------------------------------    Lab  Results for orders placed or performed during the hospital encounter of 22   CBC with Auto Differential   Result Value Ref Range    WBC 10.0 4.5 - 11.5 E9/L    RBC 3.09 (L) 3.50 - 5.50 E12/L    Hemoglobin 10.1 (L) 11.5 - 15.5 g/dL    Hematocrit 30.5 (L) 34.0 - 48.0 %    MCV 98.7 80.0 - 99.9 fL    MCH 32.7 26.0 - 35.0 pg    MCHC 33.1 32.0 - 34.5 %    RDW 12.9 11.5 - 15.0 fL    Platelets 125 736 - 371 E9/L    MPV 9.9 7.0 - 12.0 fL    Neutrophils % 78.2 43.0 - 80.0 %    Immature Granulocytes % 0.5 0.0 - 5.0 %    Lymphocytes % 11.3 (L) 20.0 - 42.0 %    Monocytes % 5.2 2.0 - 12.0 %    Eosinophils % 4.6 0.0 - 6.0 %    Basophils % 0.2 0.0 - 2.0 %    Neutrophils Absolute 7.78 (H) 1.80 - 7.30 E9/L    Immature Granulocytes # 0.05 E9/L    Lymphocytes Absolute 1.12 (L) 1.50 - 4.00 E9/L    Monocytes Absolute 0.52 0.10 - 0.95 E9/L    Eosinophils Absolute 0.46 0.05 - 0.50 E9/L    Basophils Absolute 0.02 0.00 - 0.20 E9/L   BMP   Result Value Ref Range    Sodium 136 132 - 146 mmol/L Potassium 4.8 3.5 - 5.0 mmol/L    Chloride 92 (L) 98 - 107 mmol/L    CO2 29 22 - 29 mmol/L    Anion Gap 15 7 - 16 mmol/L    Glucose 323 (H) 74 - 99 mg/dL    BUN 53 (H) 6 - 23 mg/dL    Creatinine 6.7 (H) 0.5 - 1.0 mg/dL    Est, Glom Filt Rate 6 >=60 mL/min/1.73    Calcium 9.0 8.6 - 10.2 mg/dL   Troponin   Result Value Ref Range    Troponin, High Sensitivity 67 (H) 0 - 9 ng/L   Hepatitis Panel, Acute   Result Value Ref Range    Hep A IgM Non-Reactive Non-Reactive    Hep B Core Ab, IgM Non-Reactive Non-Reactive    Hep B S Ag Interp Non-Reactive Non-Reactive    Hep C Ab Interp Non-Reactive Non-Reactive   Hepatitis B Surface Antibody   Result Value Ref Range    Hep B S Ab REACTIVE (A) Non-Reactive   EKG 12 Lead   Result Value Ref Range    Ventricular Rate 65 BPM    Atrial Rate 65 BPM    P-R Interval 148 ms    QRS Duration 100 ms    Q-T Interval 474 ms    QTc Calculation (Bazett) 492 ms    P Axis 52 degrees    R Axis -10 degrees    T Axis 68 degrees       Radiology  XR CHEST 1 VIEW   Final Result   No significant change in comparison to the prior study. ------------------------- NURSING NOTES AND VITALS REVIEWED ---------------------------  Date / Time Roomed:  12/13/2022  8:59 AM  ED Bed Assignment:  60/08    The nursing notes within the ED encounter and vital signs as below have been reviewed. No data found. Oxygen Saturation Interpretation: Normal      ------------------------------------------ PROGRESS NOTES ------------------------------------------  Re-evaluation(s):  Patients symptoms show improved  Repeat physical examination is not changed    I have spoken with the patient and discussed todays results, in addition to providing specific details for the plan of care and counseling regarding the diagnosis and prognosis.   Their questions are answered at this time and they are agreeable with the plan.    --------------------------------- ADDITIONAL PROVIDER NOTES ---------------------------------  Consultations:  Spoke with nephrology,  They will arrange for her dialysis. .    This patient's ED course included: a personal history and physicial examination and re-evaluation prior to disposition    This patient has remained hemodynamically stable and improved during their ED course. Please note that the withdrawal or failure to initiate urgent interventions for this patient would likely result in a life threatening deterioration or permanent disability. Clinical Impression  1. Upper back pain    2. ESRD on dialysis ([facility]    3. Chronic anticoagulation    4. Other pulmonary embolism without acute cor pulmonale, unspecified chronicity ([facility] [facility]        Disposition  Patient's disposition: Discharge to home  Patient's condition is stable    12/13/22, 4:47 PM EST.     This note is prepared by Ankush Barone MD -PGY- 3                Ankush Barone MD  Resident  12/14/22 9113

## 2022-12-13 NOTE — ED NOTES
Pt arrived back in ER for discharge from dialysis. Peripheral IV removed. DC instructions printed and given.   To WR via [facility] on O2 2L to wait for [email or web address] Michel RN  12/13/22 [id number]

## 2022-12-13 NOTE — FLOWSHEET NOTE
Pt completed 3 hrs of HD on a 3K bath with 2.5L of UF removed safely. 12/13/22 1730   Vital Signs   BP (!) 156/75   Temp 97.6 °F (36.4 °C)   Heart Rate 63   Resp 16   Pain Assessment   Pain Assessment None - Denies Pain   Post-Hemodialysis Assessment   Post-Treatment Procedures Blood returned; Access bleeding time < 10 minutes   Machine Disinfection Process Acid/Vinegar Clean;Heat Disinfect; Exterior Machine Disinfection   Rinseback Volume (ml) 300 ml   Blood Volume Processed (Liters) 75.6 l/min   Dialyzer Clearance Lightly streaked   Duration of Treatment (minutes) 180 minutes   Hemodialysis Output (ml) 2800 ml   Tolerated Treatment Good   Patient Response to Treatment tolerated well; stable at discharge; return to ED   Bilateral Breath Sounds Diminished   Time Off 1720   Patient Disposition Remain in ICU/ED

## 2022-12-14 LAB
HAV IGM SER IA-ACNC: NORMAL
HBV SURFACE AB TITR SER: REACTIVE {TITER}
HEPATITIS B CORE IGM ANTIBODY: NORMAL
HEPATITIS B SURFACE ANTIGEN INTERPRETATION: NORMAL
HEPATITIS C ANTIBODY INTERPRETATION: NORMAL

## 2022-12-22 ENCOUNTER — OFFICE VISIT (OUTPATIENT)
Dept: PRIMARY CARE CLINIC | Age: 67
End: 2022-12-22
Payer: MEDICARE

## 2022-12-22 VITALS
RESPIRATION RATE: 18 BRPM | HEART RATE: 98 BPM | OXYGEN SATURATION: 100 % | HEIGHT: 64 IN | DIASTOLIC BLOOD PRESSURE: 62 MMHG | TEMPERATURE: 96.9 F | BODY MASS INDEX: 44.56 KG/M2 | SYSTOLIC BLOOD PRESSURE: 102 MMHG | WEIGHT: 261 LBS

## 2022-12-22 DIAGNOSIS — E66.01 OBESITY, CLASS III, BMI 40-49.9 (MORBID OBESITY) (HCC): ICD-10-CM

## 2022-12-22 DIAGNOSIS — N18.6 ESRD ON HEMODIALYSIS (HCC): ICD-10-CM

## 2022-12-22 DIAGNOSIS — I26.99 PULMONARY EMBOLISM WITHOUT ACUTE COR PULMONALE, UNSPECIFIED CHRONICITY, UNSPECIFIED PULMONARY EMBOLISM TYPE (HCC): Primary | ICD-10-CM

## 2022-12-22 DIAGNOSIS — N18.6 END STAGE KIDNEY DISEASE (HCC): ICD-10-CM

## 2022-12-22 DIAGNOSIS — E11.8 DIABETES MELLITUS TYPE 2 WITH COMPLICATIONS (HCC): ICD-10-CM

## 2022-12-22 DIAGNOSIS — Z99.2 ESRD ON HEMODIALYSIS (HCC): ICD-10-CM

## 2022-12-22 PROCEDURE — G8417 CALC BMI ABV UP PARAM F/U: HCPCS | Performed by: INTERNAL MEDICINE

## 2022-12-22 PROCEDURE — 3074F SYST BP LT 130 MM HG: CPT | Performed by: INTERNAL MEDICINE

## 2022-12-22 PROCEDURE — 1124F ACP DISCUSS-NO DSCNMKR DOCD: CPT | Performed by: INTERNAL MEDICINE

## 2022-12-22 PROCEDURE — 3078F DIAST BP <80 MM HG: CPT | Performed by: INTERNAL MEDICINE

## 2022-12-22 PROCEDURE — 3046F HEMOGLOBIN A1C LEVEL >9.0%: CPT | Performed by: INTERNAL MEDICINE

## 2022-12-22 PROCEDURE — 99214 OFFICE O/P EST MOD 30 MIN: CPT | Performed by: INTERNAL MEDICINE

## 2022-12-22 PROCEDURE — 1036F TOBACCO NON-USER: CPT | Performed by: INTERNAL MEDICINE

## 2022-12-22 PROCEDURE — 1090F PRES/ABSN URINE INCON ASSESS: CPT | Performed by: INTERNAL MEDICINE

## 2022-12-22 PROCEDURE — G8400 PT W/DXA NO RESULTS DOC: HCPCS | Performed by: INTERNAL MEDICINE

## 2022-12-22 PROCEDURE — 2022F DILAT RTA XM EVC RTNOPTHY: CPT | Performed by: INTERNAL MEDICINE

## 2022-12-22 PROCEDURE — 1111F DSCHRG MED/CURRENT MED MERGE: CPT | Performed by: INTERNAL MEDICINE

## 2022-12-22 PROCEDURE — 3017F COLORECTAL CA SCREEN DOC REV: CPT | Performed by: INTERNAL MEDICINE

## 2022-12-22 PROCEDURE — G8484 FLU IMMUNIZE NO ADMIN: HCPCS | Performed by: INTERNAL MEDICINE

## 2022-12-22 PROCEDURE — G8427 DOCREV CUR MEDS BY ELIG CLIN: HCPCS | Performed by: INTERNAL MEDICINE

## 2022-12-22 NOTE — PROGRESS NOTES
HPI:  Patient comes in today for fu, patient was admitted to the hospital with shortness of breath found to have acute pulmonary embolism and pulmonary infarction, patient is started on anticoagulation. The patient has underlying end-stage renal function with chronic hemodialysis. Review of Systems   Constitutional:  Negative for chills, fever and unexpected weight change. HENT:  Negative for postnasal drip, sore throat and voice change. Respiratory:  Positive for shortness of breath. Negative for chest tightness and wheezing. Cardiovascular:  Negative for chest pain and leg swelling. Gastrointestinal:  Negative for abdominal pain, nausea and vomiting. Musculoskeletal:  Negative for gait problem and joint swelling. Skin:  Negative for rash and wound. Neurological: Negative. Psychiatric/Behavioral: Negative.         Past Medical History:   Diagnosis Date    Acute pulmonary embolism ([address] 12/03/2022    Anemia in CKD (chronic kidney disease) 11/30/2021    Anxiety and depression 01/19/2022    At high risk for falls 01/19/2022    Brain aneurysm     CLABSI (central line-associated bloodstream infection) 11/19/2021    Cough 01/19/2022    Diabetes mellitus ([address] 2006    Diabetes mellitus type 2 with complications ([address] 09/34/6995    Dizziness on standing 01/19/2022    ESRD (end stage renal disease) ([address] 11/19/2021    ESRD on hemodialysis ([address] 11/19/2021    Essential hypertension 11/30/2021    Fever, unspecified     Gastrointestinal hemorrhage 11/30/2021    H/O heart artery stent 2015    Done in [place]    History of Clostridioides difficile colitis 01/19/2022    Hyperlipidemia     Hypertension     MSSA bacteremia 11/18/2021    Nausea & vomiting 11/18/2021    Obesity, Class III, BMI 40-49.9 (morbid obesity) ([address] 27/21/4560    Periumbilical abdominal pain      Past Surgical History:   Procedure Laterality Date    CARDIAC CATHETERIZATION  2015    Done in [facility]  SECTION      COLONOSCOPY  2017    Negative findings    DIALYSIS FISTULA CREATION Left 2022    REVISION AV FISTULA LEFT ARM performed by Mp Graham MD at [facility]  2015    [facility] [address] ENDOSCOPY N/A 2021    EGD BIOPSY performed by Cordelia Burkitt, MD at [facility] N/A 2021    CATHETER INSERTION  TUNNELED HEMODIALYSIS, WITH REMOVAL OF TEMPORARY CATHETER performed by Mp Graham MD at [facility] History   Problem Relation Age of Onset    Coronary Art Dis Mother          age 62 acute MI    Coronary Art Dis Father          age 62 CVA    Coronary Art Dis Brother       Social History     Tobacco Use    Smoking status: Former     Packs/day: 1.00     Years: 12.00     Pack years: 12.00     Types: Cigarettes     Start date:      Quit date:      Years since quittin.0    Smokeless tobacco: Never   Vaping Use    Vaping Use: Never used   Substance Use Topics    Alcohol use: Never    Drug use: Never        Current Outpatient Medications on File Prior to Visit   Medication Sig Dispense Refill    apixaban (ELIQUIS) 5 MG TABS tablet Take 1 tablet by mouth 2 times daily 60 tablet 3    rosuvastatin (CRESTOR) 5 MG tablet Take 1 tablet by mouth daily 30 tablet 3    glucose 4 g chewable tablet Take 4 tablets by mouth as needed for Low blood sugar 60 tablet 3    Blood Glucose Monitoring Suppl MISC OneTouch ultra Glucometer 1 each 0    ONE TOUCH ULTRASOFT LANCETS MISC Test 4 times/day before meals and at bedtime and as needed for symptoms of irregular blood glucose.  250 each 5    Insulin Pen Needle (BD PEN NEEDLE MICRO U/F) 32G X 6 MM MISC Uses with insulin 4 times a day 250 each 5    acetaminophen (TYLENOL) 650 MG extended release tablet Take 650 mg by mouth every 8 hours as needed for Pain      midodrine (PROAMATINE) 10 MG tablet Take 10 mg by mouth daily as needed (AT DIALYSIS FOR LOW BP) calcium acetate (PHOSLO) 667 MG CAPS capsule Take 667-2,001 mg by mouth 3 times daily (with meals)      insulin glargine (LANTUS SOLOSTAR) 100 UNIT/ML injection pen Inject 24 Units into the skin nightly (Patient not taking: Reported on 12/22/2022) 10 Adjustable Dose Pre-filled Pen Syringe 3    insulin lispro, 1 Unit Dial, (HUMALOG KWIKPEN) 100 UNIT/ML SOPN Inject 8 units with meals + following sliding scale. -200 add 2U, -250 add 4U, -300 add 6U, -350 add 8U, -400 add 10U, BS over 400 add 12U (Patient not taking: Reported on 12/22/2022) 10 Adjustable Dose Pre-filled Pen Syringe 4     No current facility-administered medications on file prior to visit. PE:  VS:  /62   Pulse 98   Temp 96.9 °F (36.1 °C)   Resp 18   Ht 5' 4\" (1.626 m)   Wt 261 lb (118.4 kg)   SpO2 100%   BMI 44.80 kg/m²   General:  Alert and oriented, NAD  HEENT: Ear auricles are normal, EOMI, Conjunctivae clear  NECK:  Supple without adenopathy or thyromegaly, no carotid bruits. LUNGS:  CTA all fields  HEART:  RRR without M, R, or G  ABDOMEN:  Soft and nontender without palpable abnormalities, No renal or aortic bruits. EXTREMITIES: No ankle edema bilaterally. AV F left arm with good bruit. Neuro: No focal deficit. Lab Results   Component Value Date    WBC 10.0 12/13/2022    HGB 10.1 (L) 12/13/2022    HCT 30.5 (L) 12/13/2022     12/13/2022    CHOL 155 12/04/2022    TRIG 90 12/04/2022    HDL 52 12/04/2022    LDLCALC 85 12/04/2022    ALT 8 12/06/2022    AST 9 12/06/2022     12/13/2022    K 4.8 12/13/2022    CL 92 (L) 12/13/2022    CREATININE 6.7 (H) 12/13/2022    BUN 53 (H) 12/13/2022    LABGLOM 6 12/13/2022    GFRAA 8 09/22/2022    CO2 29 12/13/2022    TSH 1.470 12/03/2022    INR 1.0 08/23/2022    GLUCOSE 323 (H) 12/13/2022    LABA1C 12.7 (H) 12/03/2022         Impression/Plan:    1.  Pulmonary embolism without acute cor pulmonale, unspecified chronicity, unspecified pulmonary embolism type [facility])  Comments: To continue Eliquis for a minimum of 6 months, follow-up with pulmonary. 2. End stage kidney disease ([facility]  Comments:  Hemodialysis 3 times weekly  3. Obesity, Class III, BMI 40-49.9 (morbid obesity) ([facility]  Comments:  Excessive wt. gain ,pt to FU with patient classes. 4. Diabetes mellitus type 2 with complications ([facility]  Comments:  DM with hyperglycemia patient started on insulin, counseled regarding diet, and advancing insulin as needed, blood sugar check QID& bring readings  5. ESRD on hemodialysis [facility])  Comments:  Patient has a left BC, AV fistula, working well.

## 2022-12-27 DIAGNOSIS — E11.8 DIABETES MELLITUS TYPE 2 WITH COMPLICATIONS (HCC): Primary | ICD-10-CM

## 2022-12-27 RX ORDER — GLUCOSAMINE HCL/CHONDROITIN SU 500-400 MG
CAPSULE ORAL
Qty: 250 STRIP | Refills: 5 | Status: SHIPPED | OUTPATIENT
Start: 2022-12-27

## 2023-01-09 ENCOUNTER — TELEPHONE (OUTPATIENT)
Dept: PRIMARY CARE CLINIC | Age: 68
End: 2023-01-09

## 2023-01-09 DIAGNOSIS — E11.8 DIABETES MELLITUS TYPE 2 WITH COMPLICATIONS (HCC): Primary | ICD-10-CM

## 2023-01-09 NOTE — TELEPHONE ENCOUNTER
Pt stated the pharmacy was unable to take the $10 coupon for Eliquis and pt doesn't have insurance.  Our office only had 3 5MG boxes which pt's daughter, Cynthia La will be picking up today

## 2023-01-17 ENCOUNTER — HOSPITAL ENCOUNTER (INPATIENT)
Age: 68
LOS: 2 days | Discharge: HOME | DRG: 377 | End: 2023-01-21
Attending: STUDENT IN AN ORGANIZED HEALTH CARE EDUCATION/TRAINING PROGRAM | Admitting: INTERNAL MEDICINE
Payer: MEDICARE

## 2023-01-17 DIAGNOSIS — E11.9 TYPE 2 DIABETES MELLITUS WITHOUT COMPLICATION, WITH LONG-TERM CURRENT USE OF INSULIN (HCC): ICD-10-CM

## 2023-01-17 DIAGNOSIS — K92.0 HEMATEMESIS WITH NAUSEA: Primary | ICD-10-CM

## 2023-01-17 DIAGNOSIS — N18.6 ESRD NEEDING DIALYSIS (HCC): ICD-10-CM

## 2023-01-17 DIAGNOSIS — D64.9 ANEMIA, UNSPECIFIED TYPE: ICD-10-CM

## 2023-01-17 DIAGNOSIS — Z99.2 ESRD NEEDING DIALYSIS (HCC): ICD-10-CM

## 2023-01-17 DIAGNOSIS — Z79.4 TYPE 2 DIABETES MELLITUS WITHOUT COMPLICATION, WITH LONG-TERM CURRENT USE OF INSULIN (HCC): ICD-10-CM

## 2023-01-17 LAB
ALBUMIN SERPL-MCNC: 3.8 G/DL (ref 3.5–5.2)
ALP BLD-CCNC: 101 U/L (ref 35–104)
ALT SERPL-CCNC: 9 U/L (ref 0–32)
ANION GAP SERPL CALCULATED.3IONS-SCNC: 18 MMOL/L (ref 7–16)
APTT: 34.5 SEC (ref 24.5–35.1)
AST SERPL-CCNC: 9 U/L (ref 0–31)
BASOPHILS ABSOLUTE: 0.04 E9/L (ref 0–0.2)
BASOPHILS RELATIVE PERCENT: 0.3 % (ref 0–2)
BETA-HYDROXYBUTYRATE: 2.29 MMOL/L (ref 0.02–0.27)
BILIRUB SERPL-MCNC: 0.4 MG/DL (ref 0–1.2)
BUN BLDV-MCNC: 89 MG/DL (ref 6–23)
C-REACTIVE PROTEIN: 5.6 MG/DL (ref 0–0.4)
CALCIUM SERPL-MCNC: 8.4 MG/DL (ref 8.6–10.2)
CHLORIDE BLD-SCNC: 93 MMOL/L (ref 98–107)
CO2: 23 MMOL/L (ref 22–29)
CREAT SERPL-MCNC: 8.6 MG/DL (ref 0.5–1)
EOSINOPHILS ABSOLUTE: 0.73 E9/L (ref 0.05–0.5)
EOSINOPHILS RELATIVE PERCENT: 5.8 % (ref 0–6)
GFR SERPL CREATININE-BSD FRML MDRD: 5 ML/MIN/1.73
GLUCOSE BLD-MCNC: 472 MG/DL (ref 74–99)
HBA1C MFR BLD: 11.9 % (ref 4–5.6)
HCT VFR BLD CALC: 28.6 % (ref 34–48)
HCT VFR BLD CALC: 28.9 % (ref 34–48)
HEMOGLOBIN: 9.3 G/DL (ref 11.5–15.5)
HEMOGLOBIN: 9.5 G/DL (ref 11.5–15.5)
IMMATURE GRANULOCYTES #: 0.05 E9/L
IMMATURE GRANULOCYTES %: 0.4 % (ref 0–5)
INR BLD: 1.5
LACTIC ACID: 1.1 MMOL/L (ref 0.5–2.2)
LIPASE: 43 U/L (ref 13–60)
LYMPHOCYTES ABSOLUTE: 0.86 E9/L (ref 1.5–4)
LYMPHOCYTES RELATIVE PERCENT: 6.8 % (ref 20–42)
MCH RBC QN AUTO: 31.2 PG (ref 26–35)
MCHC RBC AUTO-ENTMCNC: 32.5 % (ref 32–34.5)
MCV RBC AUTO: 96 FL (ref 80–99.9)
METER GLUCOSE: 147 MG/DL (ref 74–99)
METER GLUCOSE: 265 MG/DL (ref 74–99)
MONOCYTES ABSOLUTE: 0.53 E9/L (ref 0.1–0.95)
MONOCYTES RELATIVE PERCENT: 4.2 % (ref 2–12)
NEUTROPHILS ABSOLUTE: 10.4 E9/L (ref 1.8–7.3)
NEUTROPHILS RELATIVE PERCENT: 82.5 % (ref 43–80)
PDW BLD-RTO: 14.2 FL (ref 11.5–15)
PHOSPHORUS: 3.2 MG/DL (ref 2.5–4.5)
PLATELET # BLD: 186 E9/L (ref 130–450)
PMV BLD AUTO: 10.1 FL (ref 7–12)
POTASSIUM SERPL-SCNC: 5.3 MMOL/L (ref 3.5–5)
PRO-BNP: ABNORMAL PG/ML (ref 0–125)
PROCALCITONIN: 0.48 NG/ML (ref 0–0.08)
PROTHROMBIN TIME: 16.8 SEC (ref 9.3–12.4)
RBC # BLD: 2.98 E12/L (ref 3.5–5.5)
SODIUM BLD-SCNC: 134 MMOL/L (ref 132–146)
TOTAL PROTEIN: 7.2 G/DL (ref 6.4–8.3)
WBC # BLD: 12.6 E9/L (ref 4.5–11.5)

## 2023-01-17 PROCEDURE — 90935 HEMODIALYSIS ONE EVALUATION: CPT

## 2023-01-17 PROCEDURE — G0378 HOSPITAL OBSERVATION PER HR: HCPCS

## 2023-01-17 PROCEDURE — 96375 TX/PRO/DX INJ NEW DRUG ADDON: CPT

## 2023-01-17 PROCEDURE — 6360000002 HC RX W HCPCS

## 2023-01-17 PROCEDURE — 83880 ASSAY OF NATRIURETIC PEPTIDE: CPT

## 2023-01-17 PROCEDURE — 86140 C-REACTIVE PROTEIN: CPT

## 2023-01-17 PROCEDURE — 85018 HEMOGLOBIN: CPT

## 2023-01-17 PROCEDURE — C9113 INJ PANTOPRAZOLE SODIUM, VIA: HCPCS

## 2023-01-17 PROCEDURE — 83036 HEMOGLOBIN GLYCOSYLATED A1C: CPT

## 2023-01-17 PROCEDURE — 6370000000 HC RX 637 (ALT 250 FOR IP): Performed by: INTERNAL MEDICINE

## 2023-01-17 PROCEDURE — 85014 HEMATOCRIT: CPT

## 2023-01-17 PROCEDURE — 85610 PROTHROMBIN TIME: CPT

## 2023-01-17 PROCEDURE — 84100 ASSAY OF PHOSPHORUS: CPT

## 2023-01-17 PROCEDURE — 82962 GLUCOSE BLOOD TEST: CPT

## 2023-01-17 PROCEDURE — 84145 PROCALCITONIN (PCT): CPT

## 2023-01-17 PROCEDURE — 5A1D70Z PERFORMANCE OF URINARY FILTRATION, INTERMITTENT, LESS THAN 6 HOURS PER DAY: ICD-10-PCS | Performed by: INTERNAL MEDICINE

## 2023-01-17 PROCEDURE — 85730 THROMBOPLASTIN TIME PARTIAL: CPT

## 2023-01-17 PROCEDURE — 36415 COLL VENOUS BLD VENIPUNCTURE: CPT

## 2023-01-17 PROCEDURE — 82010 KETONE BODYS QUAN: CPT

## 2023-01-17 PROCEDURE — 85025 COMPLETE CBC W/AUTO DIFF WBC: CPT

## 2023-01-17 PROCEDURE — 83605 ASSAY OF LACTIC ACID: CPT

## 2023-01-17 PROCEDURE — 96374 THER/PROPH/DIAG INJ IV PUSH: CPT

## 2023-01-17 PROCEDURE — 99285 EMERGENCY DEPT VISIT HI MDM: CPT

## 2023-01-17 PROCEDURE — 83690 ASSAY OF LIPASE: CPT

## 2023-01-17 PROCEDURE — 6370000000 HC RX 637 (ALT 250 FOR IP)

## 2023-01-17 PROCEDURE — 80053 COMPREHEN METABOLIC PANEL: CPT

## 2023-01-17 RX ORDER — INSULIN GLARGINE 100 [IU]/ML
24 INJECTION, SOLUTION SUBCUTANEOUS NIGHTLY
Status: DISCONTINUED | OUTPATIENT
Start: 2023-01-17 | End: 2023-01-21 | Stop reason: HOSPADM

## 2023-01-17 RX ORDER — PANTOPRAZOLE SODIUM 40 MG/10ML
40 INJECTION, POWDER, LYOPHILIZED, FOR SOLUTION INTRAVENOUS DAILY
Status: DISCONTINUED | OUTPATIENT
Start: 2023-01-18 | End: 2023-01-17 | Stop reason: SDUPTHER

## 2023-01-17 RX ORDER — CEFDINIR 300 MG/1
300 CAPSULE ORAL ONCE
Status: DISCONTINUED | OUTPATIENT
Start: 2023-01-17 | End: 2023-01-17

## 2023-01-17 RX ORDER — SUCRALFATE 1 G/1
1 TABLET ORAL ONCE
Status: COMPLETED | OUTPATIENT
Start: 2023-01-17 | End: 2023-01-17

## 2023-01-17 RX ORDER — INSULIN LISPRO 100 [IU]/ML
0-4 INJECTION, SOLUTION INTRAVENOUS; SUBCUTANEOUS
Status: DISCONTINUED | OUTPATIENT
Start: 2023-01-17 | End: 2023-01-21 | Stop reason: HOSPADM

## 2023-01-17 RX ORDER — DIPHENHYDRAMINE HYDROCHLORIDE 50 MG/ML
12.5 INJECTION INTRAMUSCULAR; INTRAVENOUS ONCE
Status: COMPLETED | OUTPATIENT
Start: 2023-01-17 | End: 2023-01-17

## 2023-01-17 RX ORDER — INSULIN LISPRO 100 [IU]/ML
0-4 INJECTION, SOLUTION INTRAVENOUS; SUBCUTANEOUS NIGHTLY
Status: DISCONTINUED | OUTPATIENT
Start: 2023-01-17 | End: 2023-01-21 | Stop reason: HOSPADM

## 2023-01-17 RX ORDER — DIPHENHYDRAMINE HCL 25 MG
25 TABLET ORAL PRN
Status: DISCONTINUED | OUTPATIENT
Start: 2023-01-17 | End: 2023-01-21 | Stop reason: HOSPADM

## 2023-01-17 RX ORDER — DIPHENHYDRAMINE HYDROCHLORIDE 50 MG/ML
INJECTION INTRAMUSCULAR; INTRAVENOUS
Status: COMPLETED
Start: 2023-01-17 | End: 2023-01-17

## 2023-01-17 RX ORDER — PANTOPRAZOLE SODIUM 40 MG/10ML
40 INJECTION, POWDER, LYOPHILIZED, FOR SOLUTION INTRAVENOUS ONCE
Status: COMPLETED | OUTPATIENT
Start: 2023-01-17 | End: 2023-01-17

## 2023-01-17 RX ORDER — DEXTROSE MONOHYDRATE 100 MG/ML
INJECTION, SOLUTION INTRAVENOUS CONTINUOUS PRN
Status: DISCONTINUED | OUTPATIENT
Start: 2023-01-17 | End: 2023-01-21 | Stop reason: HOSPADM

## 2023-01-17 RX ORDER — MIDODRINE HYDROCHLORIDE 5 MG/1
10 TABLET ORAL DAILY PRN
Status: DISCONTINUED | OUTPATIENT
Start: 2023-01-17 | End: 2023-01-21 | Stop reason: HOSPADM

## 2023-01-17 RX ADMIN — INSULIN GLARGINE 24 UNITS: 100 INJECTION, SOLUTION SUBCUTANEOUS at 20:39

## 2023-01-17 RX ADMIN — SUCRALFATE 1 G: 1 TABLET ORAL at 09:47

## 2023-01-17 RX ADMIN — PANTOPRAZOLE SODIUM 40 MG: 40 INJECTION, POWDER, FOR SOLUTION INTRAVENOUS at 09:47

## 2023-01-17 RX ADMIN — DIPHENHYDRAMINE HYDROCHLORIDE 12.5 MG: 50 INJECTION, SOLUTION INTRAMUSCULAR; INTRAVENOUS at 14:40

## 2023-01-17 RX ADMIN — DIPHENHYDRAMINE HYDROCHLORIDE 12.5 MG: 50 INJECTION INTRAMUSCULAR; INTRAVENOUS at 14:40

## 2023-01-17 ASSESSMENT — PAIN DESCRIPTION - FREQUENCY: FREQUENCY: INTERMITTENT

## 2023-01-17 ASSESSMENT — PAIN SCALES - GENERAL
PAINLEVEL_OUTOF10: 0
PAINLEVEL_OUTOF10: 0
PAINLEVEL_OUTOF10: 5

## 2023-01-17 ASSESSMENT — PAIN - FUNCTIONAL ASSESSMENT
PAIN_FUNCTIONAL_ASSESSMENT: NONE - DENIES PAIN
PAIN_FUNCTIONAL_ASSESSMENT: 0-10

## 2023-01-17 ASSESSMENT — PAIN DESCRIPTION - ORIENTATION
ORIENTATION: LEFT
ORIENTATION: LOWER

## 2023-01-17 ASSESSMENT — PAIN DESCRIPTION - DESCRIPTORS
DESCRIPTORS: ACHING
DESCRIPTORS: ACHING

## 2023-01-17 ASSESSMENT — PAIN DESCRIPTION - LOCATION
LOCATION: BACK
LOCATION: OTHER (COMMENT)

## 2023-01-17 ASSESSMENT — PAIN DESCRIPTION - PAIN TYPE: TYPE: ACUTE PAIN

## 2023-01-17 NOTE — PROGRESS NOTES
Database initiated. Patient is A&O from home with daughter . States she uses a walker but has a wheelchair if needed. She is a NO LEFT ARM d/t dialysis access.  She wears 2 liters at baseline through [employer]

## 2023-01-17 NOTE — ED PROVIDER NOTES
[facility] [address] [place] [address] [address]        Pt Name: Jorge L Purcell  MRN: [id number]  [place] 1955  Date of evaluation: 1/17/2023  Provider: Bobby Quick DO  PCP: Bertha Zuñiga MD  Note Started: 8:07 AM EST 1/17/23    CHIEF COMPLAINT       Chief Complaint   Patient presents with    Hematemesis     Patient stated she vomited and it looked like there was  blood was in it. Patient also stated she has not had dialysis since last Thursday. Patient was on her way to dialysis when starting to vomit blood. HISTORY OF PRESENT ILLNESS: 1 or more Elements   History From: Patient    Limitations to history : None    Jorge L Purcell is a 78 y.o. female who presents to the emergency department today with her daughter with a chief complaint of hematemesis. Patient states that started just this morning and she only had 1 episode of hematemesis where her vomit had a brown tinge to it. Patient states that she has not had this happen to her before and does endorse that she is taking Eliquis for a recently diagnosed pulmonary embolism. Patient also mentions that she was getting ready to go to dialysis this morning given that she has not been to dialysis since last Thursday. She does endorse having some dysuria without hematuria but patient otherwise is only having a little bit of nausea on epigastric discomfort on abdominal exam and all in all states that she feels well. Patient denies any fever, chills, chest pain, shortness of breath, constipation or diarrhea. Nursing Notes were all reviewed and agreed with or any disagreements were addressed in the HPI. REVIEW OF SYSTEMS :      Positives and Pertinent negatives as per HPI.      SURGICAL HISTORY     Past Surgical History:   Procedure Laterality Date    CARDIAC CATHETERIZATION  2015    Done in [address]      COLONOSCOPY  2017    Negative findings    DIALYSIS FISTULA CREATION Left 01/28/2022 REVISION AV FISTULA LEFT ARM performed by Teressa Milner MD at [address]    PTCA      PTCA [address] N/A 2021    EGD BIOPSY performed by Chester Brennan MD at [facility] N/A 2021    CATHETER INSERTION  TUNNELED HEMODIALYSIS, WITH REMOVAL OF TEMPORARY CATHETER performed by Teressa Milner MD at [address]       Current Discharge Medication List        CONTINUE these medications which have NOT CHANGED    Details   apixaban (ELIQUIS) 5 MG TABS tablet Take 1 tablet by mouth 2 times daily  Qty: 60 tablet, Refills: 3      rosuvastatin (CRESTOR) 5 MG tablet Take 1 tablet by mouth daily  Qty: 30 tablet, Refills: 3      insulin glargine (LANTUS SOLOSTAR) 100 UNIT/ML injection pen Inject 24 Units into the skin nightly  Qty: 10 Adjustable Dose Pre-filled Pen Syringe, Refills: 3      insulin lispro, 1 Unit Dial, (HUMALOG KWIKPEN) 100 UNIT/ML SOPN Inject 8 units with meals + following sliding scale.  -200 add 2U, -250 add 4U, -300 add 6U, -350 add 8U, -400 add 10U, BS over 400 add 12U  Qty: 10 Adjustable Dose Pre-filled Pen Syringe, Refills: 4      acetaminophen (TYLENOL) 650 MG extended release tablet Take 1,300 mg by mouth every 8 hours as needed for Pain      midodrine (PROAMATINE) 10 MG tablet Take 10 mg by mouth daily as needed (AT DIALYSIS FOR LOW BP)      calcium acetate (PHOSLO) 667 MG CAPS capsule Take 667 mg by mouth 3 times daily (with meals)             ALLERGIES     Pcn [penicillins], Benzonatate, Morphine, and Sodium hypochlorite    FAMILYHISTORY       Family History   Problem Relation Age of Onset    Coronary Art Dis Mother          age 62 acute MI    Coronary Art Dis Father          age 62 CVA    Coronary Art Dis Brother         SOCIAL HISTORY       Social History     Tobacco Use    Smoking status: Former     Packs/day: 1.00     Years: 12.00     Pack years: 12.00     Types: Cigarettes     Start date: 66     Quit date: 12     Years since quittin.0    Smokeless tobacco: Never   Vaping Use    Vaping Use: Never used   Substance Use Topics    Alcohol use: Never    Drug use: Never       SCREENINGS        Pinky Coma Scale  Eye Opening: Spontaneous  Best Verbal Response: Oriented  Best Motor Response: Obeys commands  [place] Coma Scale Score: 15                CIWA Assessment  BP: 96/80  Heart Rate: 99           PHYSICAL EXAM  1 or more Elements     ED Triage Vitals [23 0633]   BP Temp Temp Source Heart Rate Resp SpO2 Height Weight   (!) 179/67 98 °F (36.7 °C) Tympanic 93 18 98 % 5' 4\" (1.626 m) 260 lb (117.9 kg)       Constitutional/General: Alert and oriented x3  Head: Normocephalic and atraumatic  Eyes: PERRL, EOMI, conjunctiva normal, sclera non icteric  ENT:  Oropharynx clear, handling secretions, no trismus, no asymmetry of the posterior oropharynx or uvular edema  Neck: Supple, full ROM, no stridor, no meningeal signs  Respiratory: Lungs clear to auscultation bilaterally, no wheezes, rales, or rhonchi. Not in respiratory distress  Cardiovascular:  Regular rate. Regular rhythm. No murmurs, no gallops, no rubs. 2+ distal pulses. Equal extremity pulses. Chest: No chest wall tenderness  GI:  Abdomen Soft, mild epigastric and right upper quadrant tenderness to deep palpation, Non distended. +BS. No rebound, guarding, or rigidity. No pulsatile masses. Musculoskeletal: Moves all extremities x 4. Warm and well perfused, no clubbing, no cyanosis, no edema. Capillary refill <3 seconds  Integument: skin warm and dry. No rashes.    Neurologic: GCS 15, no focal deficits, symmetric strength 5/5 in the upper and lower extremities bilaterally  Psychiatric: Normal Affect      DIAGNOSTIC RESULTS   LABS:    Labs Reviewed   CBC WITH AUTO DIFFERENTIAL - Abnormal; Notable for the following components:       Result Value    WBC 12.6 (*)     RBC 2.98 (*)     Hemoglobin 9.3 (*)     Hematocrit 28.6 (*)     Neutrophils % 82.5 (*)     Lymphocytes % 6.8 (*)     Neutrophils Absolute 10.40 (*)     Lymphocytes Absolute 0.86 (*)     Eosinophils Absolute 0.73 (*)     All other components within normal limits   COMPREHENSIVE METABOLIC PANEL - Abnormal; Notable for the following components:    Potassium 5.3 (*)     Chloride 93 (*)     Anion Gap 18 (*)     Glucose 472 (*)     BUN 89 (*)     Creatinine 8.6 (*)     Calcium 8.4 (*)     All other components within normal limits    Narrative:     CALL  Archie MARR tel. [phone],  Critical Chemistry results called to and read back by Vijaya Martel RN,  01/17/2023 09:54, by Yefri Uribe - Abnormal; Notable for the following components:    Protime 16.8 (*)     All other components within normal limits   BRAIN NATRIURETIC PEPTIDE - Abnormal; Notable for the following components:    Pro-BNP 34,677 (*)     All other components within normal limits   BETA-HYDROXYBUTYRATE - Abnormal; Notable for the following components:    Beta-Hydroxybutyrate 2.29 (*)     All other components within normal limits   HEMOGLOBIN AND HEMATOCRIT - Abnormal; Notable for the following components:    Hemoglobin 9.5 (*)     Hematocrit 28.9 (*)     All other components within normal limits   POCT GLUCOSE - Abnormal; Notable for the following components:    Meter Glucose 147 (*)     All other components within normal limits   LACTIC ACID   LIPASE   APTT   PHOSPHORUS   URINALYSIS   BASIC METABOLIC PANEL   PHOSPHORUS   BASIC METABOLIC PANEL   BLOOD OCCULT STOOL SCREEN #1   CALCIUM, IONIZED   HEMOGLOBIN AND HEMATOCRIT   HEMOGLOBIN A1C   C-REACTIVE PROTEIN   MAGNESIUM   LIPID PANEL   LACTATE, SEPSIS   IRON AND TIBC   HEPATIC FUNCTION PANEL   FERRITIN   PHOSPHORUS   PROCALCITONIN   POCT GLUCOSE   POCT GLUCOSE       As interpreted by me, the above displayed labs are abnormal. All other labs obtained during this visit were within normal range or not returned as of this dictation. RADIOLOGY:   No orders to display     No results found. No results found. PROCEDURES   Unless otherwise noted below, none    PAST MEDICAL HISTORY/Chronic Conditions Affecting Care      has a past medical history of Acute pulmonary embolism ([facility] (12/03/2022), Anemia in CKD (chronic kidney disease) (11/30/2021), Anxiety and depression (01/19/2022), At high risk for falls (01/19/2022), Brain aneurysm, CLABSI (central line-associated bloodstream infection) (11/19/2021), Cough (01/19/2022), Diabetes mellitus ([facility] (2006), Diabetes mellitus type 2 with complications ([facility] (54/19/2511), Dizziness on standing (01/19/2022), ESRD (end stage renal disease) ([facility] (11/19/2021), ESRD on hemodialysis ([facility] (11/19/2021), Essential hypertension (11/30/2021), Fever, unspecified, Gastrointestinal hemorrhage (11/30/2021), H/O heart artery stent (2015), History of Clostridioides difficile colitis (01/19/2022), Hyperlipidemia, Hypertension, MSSA bacteremia (11/18/2021), Nausea & vomiting (11/18/2021), Obesity, Class III, BMI 40-49.9 (morbid obesity) ([facility] (92/27/1779), and Periumbilical abdominal pain.      EMERGENCY DEPARTMENT COURSE    Vitals:    Vitals:    01/17/23 1530 01/17/23 1600 01/17/23 1645 01/17/23 1745   BP: 110/74 (!) 127/56 (!) 107/50 96/80   Pulse: 93 69 52 99   Resp:   18 20   Temp:   98.2 °F (36.8 °C) 98.2 °F (36.8 °C)   TempSrc:    Oral   SpO2:    97%   Weight:   255 lb 1.2 oz (115.7 kg)    Height:           Patient was given the following medications:  Medications   insulin glargine (LANTUS) injection vial 24 Units (has no administration in time range)   midodrine (PROAMATINE) tablet 10 mg (has no administration in time range)   glucose chewable tablet 16 g (has no administration in time range)   dextrose bolus 10% 125 mL (has no administration in time range)     Or   dextrose bolus 10% 250 mL (has no administration in time range)   glucagon (rDNA) injection 1 mg (has no administration in time range)   dextrose 10 % infusion (has no administration in time range)   insulin lispro (HUMALOG) injection vial 0-4 Units (0 Units SubCUTAneous Not Given 1/17/23 1836)   insulin lispro (HUMALOG) injection vial 0-4 Units (has no administration in time range)   diphenhydrAMINE (BENADRYL) tablet 25 mg (has no administration in time range)   epoetin sadia-epbx (RETACRIT) injection 3,520 Units (has no administration in time range)   pantoprazole (PROTONIX) 40 mg in sodium chloride (PF) 0.9 % 10 mL injection (has no administration in time range)   pantoprazole (PROTONIX) injection 40 mg (40 mg IntraVENous Given 1/17/23 0947)   sucralfate (CARAFATE) tablet 1 g (1 g Oral Given 1/17/23 0947)   diphenhydrAMINE (BENADRYL) injection 12.5 mg (12.5 mg IntraVENous Given 1/17/23 1440)       Medical Decision Making/Differential Diagnosis:    CC/HPI Summary, Social Determinants of health, Records Reviewed, DDx, testing done/not done, ED Course, Reassessment, disposition considerations/shared decision making with patient, consults, disposition:        CC/HPI Summary, DDx, ED Course, Reassessment, Tests Considered, Patient expectation:      50-year-old female presents to the emergency department today with chief complaint of hematemesis. Differential diagnosis includes but is not limited to peptic ulcer disease, Boerhaave syndrome, gastroenteritis, anticoagulant associated bleeding. Due to patient's physical exam not demonstrating any crepitus in the upper chest and patient's breathing not appearing to be labored, low suspicion for any sort of esophageal perforation including Boerhaave syndrome. Patient has remained afebrile and is not experiencing any symptoms such as diarrhea or otherwise upset stomach in addition to mild leukocytosis of 12.6, decreasing likelihood of gastroenteritis as well.   Due to patient's episode of hematemesis and being on anticoagulants with a remote history for peptic ulcer disease, concern for upper GI bleeding. Lab studies for CBC and CMP were ordered in addition to PT with INR and APTT. Patient's electrolytes are slightly skewed with a potassium of 5.3, BUN of 89 and creatinine of 8.6, all that appear to be consistent with patient's baseline and history of end-stage renal disease needing dialysis. Patient's CBC has a hemoglobin of 9.3 and coagulation studies are within normal limits. Due to this presentation and these lab results, decision made to admit patient to the hospital for gastroenterology consultation as well as for her to acquire hemodialysis today. Spoke with nephrology as well as hospital service who are willing to consult and admit the patient respectively. Patient informed of all these decisions and she is understanding and agreeable with them. All of her questions were answered and she has been closely monitored. Patient has remained hemodynamically stable throughout the course of her emergency department stay. ED Course as of 01/17/23 2011 Tue Jan 17, 2023   3824 Patient evaluated at bedside and is hemodynamically stable and resting comfortably at this time. [RW]   8027 Patient reevaluated at bedside at this time. Patient is in hemodynamically stable condition and is resting comfortably. [RW]   7050 ATTENDING PROVIDER ATTESTATION:     I have personally performed and/or participated in the history, exam, medical decision making, and procedures and agree with all pertinent clinical information unless otherwise noted. I have also reviewed and agree with the past medical, family and social history unless otherwise noted. I have discussed this patient in detail with the resident, and provided the instruction and education regarding the patient. My findings/plan: This is a 81-year-old female with history of ESRD on HD, Tuesday/Thursday/Saturday follows with Dr. Jessica Sanchez who presents for evaluation hematemesis. Notes that her last full session was on Thursday of last week.   Missed Saturday with some back pain, which she states was similar to her pain when she had a PE. Notes she was recently diagnosed with pulmonary embolism and is on anticoagulation. States this morning she woke up with nausea and had an episode of coffee-ground emesis. States that waking up nauseated is normal, occurs about 50% of the time when she wakes up. Denies any dizziness, lightheadedness, chest pain, shortness of breath. She is chronically on 2 L O2, 3 L O2 with ambulation and exertion. Endorses history of a ulcer years ago about 30 years ago. Does not follow with a general surgeon. On exam she is lying bed no acute distress. Fistula to the left upper extremity with good thrill. Heart regular rate and rhythm  Lungs are clear auscultation bilaterally anteriorly  Abdomen soft, nondistended, nontender. She does have some lymphedema bilateral lower extremities. Plan for labs, supportive care. [BB]   [id number] Critical value of creatinine 8.6 communicated. Patient is on dialysis and this creatinine appears to be around her baseline. [RW]   6103 Patient reevaluated at bedside at this time and states that she is feeling well without recurrence of vomiting. [RW]   12 Spoke with Dr. Werner Santiago who is willing to admit the patient to the hospital at this time. [RW]   [phone] with Dr. Deidra Moffett who will have the patient to dialysis later today. [RW]      ED Course User Index  [BB] Katelyn Lopez DO  [RW] Ada Dai,         Social Determinants affecting Dx or Tx: None    Chronic Conditions: Diabetes mellitus, hypertension, hyperlipidemia, end-stage renal disease with dialysis      I am the Primary Clinician of Record.     CONSULTS: (Who and What was discussed)  IP CONSULT TO NEPHROLOGY  IP CONSULT TO INTERNAL MEDICINE  IP CONSULT TO PULMONOLOGY  IP CONSULT TO NEPHROLOGY  IP CONSULT TO SOCIAL WORK  IP CONSULT TO GI  Spoke with Dr. Deidra Moffett with Nephrology regarding patient's need for dialysis who states that patient will get dialysis inpatient later today. Spoke with Dr. Cristian Butler who is willing to admit the patient to the hospital for further evaluation of new onset hematemesis in the setting of anticoagulation use. I am the Primary Clinician of Record. FINAL IMPRESSION      1. Hematemesis with nausea    2. ESRD needing dialysis [facility])          DISPOSITION/PLAN     DISPOSITION Admitted 01/17/2023 11:15:39 AM      PATIENT REFERRED TO:  No follow-up provider specified.     DISCHARGE MEDICATIONS:  Current Discharge Medication List               (Please note that portions of this note were completed with a voice recognition program.  Efforts were made to edit the dictations but occasionally words are mis-transcribed.)    Jaya Marcos DO (electronically signed)           Jaya Marcos DO  Resident  01/17/23 2012

## 2023-01-17 NOTE — H&P
History and Physical      CHIEF COMPLAINT: Vomiting blood    History of Present Illness: 60-year-old female patient Dr. Juana Muro am asked admit and follow. History obtained from patient as well as extensive review electronic record. Patient was discharged from this hospital 12/6/2022 with final diagnosis of acute pulmonary embolus. She was experiencing chest pain and back pain on her way to dialysis. CTA done at that time with following results--     Impression:       1. Pulmonary embolus seen within the segmental and subsegmental pulmonary   arteries of the medial basal segment and posterior basal segment of the right   lower lobe. 2. There are no findings of right ventricular strain   3. There is no pulmonary embolus seen within the pulmonary arteries of the   left lung   4. Patchy airspace disease within the right lower lobe which could represent   pneumonia or possibly an early pulmonary infarct   5. Linear atelectasis seen within the left lung base   6. Small amount of pleural fluid seen within the right major fissure. At discharge she was placed on apixaban 10 mg twice a day for x7 days and then 5 mg twice daily. She was on her way to dialysis this morning an episode of emesis that was brown tinge presumably blood. Never had seen this before. Her last dialysis was on 1/12/2023. --In the ED blood pressure 179/67. SPO2 was 98 on room air; 97 on 3 L nasal cannula. --In the ED BUN 89 creatinine 8.6 potassium 5.3. Glucose was 472. Liver functions were normal.  WBC was 12.9; hemoglobin was 9.3. Previous hemoglobin was 10.1 from 12/13/2022. INR was 1.5.  --From the ED she was sent to hemodialysis. --Patient underwent EGD 12/1/2021 with following results--    DATE OF PROCEDURE:  12/01/2021     PROCEDURE PERFORMED:  Upper endoscopy with biopsy. PREOPERATIVE DIAGNOSES:  Evaluation for anemia and epigastric pain. POSTOPERATIVE DIAGNOSES:  Grade B LA classification GERD. Gastritis,  biopsied to rule out H. pylori infection. Duodenum showed scalloping  suspicious for celiac disease; biopsies were done. -- Biopsy results from that time revealed chronic duodenitis with features of peptic duodenitis. BRANDIE test was negative. -- Patient developed diabetes approximately age 49. She had been on metformin originally; started on insulin at her last hospitalization 2022.   --Smoking approximately  quit  1 pack daily 15 years  --Mother  age 62 acute MI, father  age 62 CVA  --Coronary artery disease with PTCA PCI done while living in [place], [place]  --Hemodialysis she states 2021; [facility] states 2021  --Denies any rheumatic fever scarlet fever polio diphtheria cancer      Past Medical History:   Diagnosis Date    Acute pulmonary embolism ([address] 2022    Anemia in CKD (chronic kidney disease) 2021    Anxiety and depression 2022    At high risk for falls 2022    Brain aneurysm     CLABSI (central line-associated bloodstream infection) 2021    Cough 2022    Diabetes mellitus ([address] 2006    Diabetes mellitus type 2 with complications ([address]     Dizziness on standing 2022    ESRD (end stage renal disease) ([address] 2021    ESRD on hemodialysis ([address] 2021    Essential hypertension 2021    Fever, unspecified     Gastrointestinal hemorrhage 2021    H/O heart artery stent 2015    Done in [place]    History of Clostridioides difficile colitis 2022    Hyperlipidemia     Hypertension     MSSA bacteremia 2021    Nausea & vomiting 2021    Obesity, Class III, BMI 40-49.9 (morbid obesity) ([address]     Periumbilical abdominal pain          Past Surgical History:   Procedure Laterality Date    CARDIAC CATHETERIZATION  2015    Done in [address] [place] [address] Rd  2017    Negative findings    DIALYSIS FISTULA CREATION Left 2022 REVISION AV FISTULA LEFT ARM performed by Willa Bella MD at [address]    PTCA  2015    PTCA [facility] N/A 12/01/2021    EGD BIOPSY performed by Fatou Calderón MD at [address] [place] [address] N/A 11/24/2021    CATHETER INSERTION  TUNNELED HEMODIALYSIS, WITH REMOVAL OF TEMPORARY CATHETER [facility]       Medications Prior to Admission:    Not in a hospital admission. Allergies:    Pcn [penicillins], Benzonatate, Morphine, and Sodium hypochlorite    Social History:    reports that she quit smoking about 37 years ago. Her smoking use included cigarettes. She started smoking about 49 years ago. She has a 12.00 pack-year smoking history. She has never used smokeless tobacco. She reports that she does not drink alcohol and does not use drugs. Family History:   family history includes Coronary Art Dis in her brother, father, and mother. REVIEW OF SYSTEMS:  As above in the HPI, otherwise negative    PHYSICAL EXAM:    VS: BP (!) 151/78   Pulse 73   Temp 98.2 °F (36.8 °C)   Resp 18   Ht 5' 4\" (1.626 m)   Wt 259 lb 14.8 oz (117.9 kg) Comment: chart  SpO2 97%   BMI 44.62 kg/m²     General appearance: Alert, Awake, Oriented times 3, no distress; morbidly obese; seen while in dialysis  Skin: Warm and dry ; no rashes  Head: Normocephalic. No masses, lesions or tenderness noted  Eyes: Conjunctivae pale, sclera white. PERRL,EOM-I  Ears: External ears normal  Nose/Sinuses: Nares normal. Septum midline. Mucosa normal. No drainage  Oropharynx: Oropharynx clear with no exudate seen  Neck: Supple. No jugular venous distension, lymphadenopathy or thyromegaly Trachea midline  Lungs: Clear to auscultation bilaterally. No rhonchi, crackles or wheezes  Heart: S1 S2  Regular rate and rhythm. No rub, gallop, murmur  Abdomen: Soft, tender especially right mid and lower quadrant.  BS normal. No masses, organomegaly; no rebound or guarding  Extremities: Chronic 2-3+ bilateral edema since starting dialysis  Musculoskeletal: Muscular strength appears intact. Neuro:  No focal motor defects ; II-XII grossly intact .  MORRIS equally  Breast: deferred  Rectal: deferred  Genitalia:  deferred    LABS:  CBC:   Lab Results   Component Value Date/Time    WBC 12.6 01/17/2023 08:35 AM    RBC 2.98 01/17/2023 08:35 AM    HGB 9.3 01/17/2023 08:35 AM    HCT 28.6 01/17/2023 08:35 AM    MCV 96.0 01/17/2023 08:35 AM    MCH 31.2 01/17/2023 08:35 AM    MCHC 32.5 01/17/2023 08:35 AM    RDW 14.2 01/17/2023 08:35 AM     01/17/2023 08:35 AM    MPV 10.1 01/17/2023 08:35 AM     CBC with Differential:    Lab Results   Component Value Date/Time    WBC 12.6 01/17/2023 08:35 AM    RBC 2.98 01/17/2023 08:35 AM    HGB 9.3 01/17/2023 08:35 AM    HCT 28.6 01/17/2023 08:35 AM     01/17/2023 08:35 AM    MCV 96.0 01/17/2023 08:35 AM    MCH 31.2 01/17/2023 08:35 AM    MCHC 32.5 01/17/2023 08:35 AM    RDW 14.2 01/17/2023 08:35 AM    NRBC 0.0 01/13/2022 04:38 AM    LYMPHOPCT 6.8 01/17/2023 08:35 AM    MONOPCT 4.2 01/17/2023 08:35 AM    BASOPCT 0.3 01/17/2023 08:35 AM    MONOSABS 0.53 01/17/2023 08:35 AM    LYMPHSABS 0.86 01/17/2023 08:35 AM    EOSABS 0.73 01/17/2023 08:35 AM    BASOSABS 0.04 01/17/2023 08:35 AM     Hemoglobin/Hematocrit:    Lab Results   Component Value Date/Time    HGB 9.3 01/17/2023 08:35 AM    HCT 28.6 01/17/2023 08:35 AM     CMP:    Lab Results   Component Value Date/Time     01/17/2023 08:35 AM    K 5.3 01/17/2023 08:35 AM    K 4.2 08/23/2022 12:12 PM    CL 93 01/17/2023 08:35 AM    CO2 23 01/17/2023 08:35 AM    BUN 89 01/17/2023 08:35 AM    CREATININE 8.6 01/17/2023 08:35 AM    GFRAA 8 09/22/2022 12:14 PM    LABGLOM 5 01/17/2023 08:35 AM    GLUCOSE 472 01/17/2023 08:35 AM    PROT 7.2 01/17/2023 08:35 AM    LABALBU 3.8 01/17/2023 08:35 AM    CALCIUM 8.4 01/17/2023 08:35 AM    BILITOT 0.4 01/17/2023 08:35 AM    ALKPHOS 101 01/17/2023 08:35 AM AST 9 01/17/2023 08:35 AM    ALT 9 01/17/2023 08:35 AM     BMP:    Lab Results   Component Value Date/Time     01/17/2023 08:35 AM    K 5.3 01/17/2023 08:35 AM    K 4.2 08/23/2022 12:12 PM    CL 93 01/17/2023 08:35 AM    CO2 23 01/17/2023 08:35 AM    BUN 89 01/17/2023 08:35 AM    LABALBU 3.8 01/17/2023 08:35 AM    CREATININE 8.6 01/17/2023 08:35 AM    CALCIUM 8.4 01/17/2023 08:35 AM    GFRAA 8 09/22/2022 12:14 PM    LABGLOM 5 01/17/2023 08:35 AM    GLUCOSE 472 01/17/2023 08:35 AM     Hepatic Function Panel:    Lab Results   Component Value Date/Time    ALKPHOS 101 01/17/2023 08:35 AM    ALT 9 01/17/2023 08:35 AM    AST 9 01/17/2023 08:35 AM    PROT 7.2 01/17/2023 08:35 AM    BILITOT 0.4 01/17/2023 08:35 AM    BILIDIR <0.2 12/06/2022 02:47 AM    IBILI see below 12/06/2022 02:47 AM    LABALBU 3.8 01/17/2023 08:35 AM     Ionized Calcium:  No results found for: IONCA  Magnesium:    Lab Results   Component Value Date/Time    MG 2.2 12/06/2022 02:47 AM     Phosphorus:    Lab Results   Component Value Date/Time    PHOS 5.5 12/06/2022 02:47 AM     LDH:    Lab Results   Component Value Date/Time     01/11/2022 12:45 PM     Uric Acid:    Lab Results   Component Value Date/Time    LABURIC 3.3 12/04/2022 05:30 AM     PT/INR:    Lab Results   Component Value Date/Time    PROTIME 16.8 01/17/2023 08:35 AM    INR 1.5 01/17/2023 08:35 AM     Warfarin PT/INR:  No components found for: PTPATWAR, PTINRWAR  PTT:    Lab Results   Component Value Date/Time    APTT 34.5 01/17/2023 08:35 AM   [APTT}  Troponin:  No results found for: TROPONINI  Last 3 Troponin:  No results found for: TROPONINI  U/A:    Lab Results   Component Value Date/Time    COLORU Yellow 11/18/2021 03:19 AM    PROTEINU >=300 11/18/2021 03:19 AM    PHUR 6.0 11/18/2021 03:19 AM    WBCUA 1-3 11/18/2021 03:19 AM    RBCUA 0-1 11/18/2021 03:19 AM    BACTERIA FEW 11/18/2021 03:19 AM    CLARITYU Clear 11/18/2021 03:19 AM    SPECGRAV 1.020 11/18/2021 03:19 AM LEUKOCYTESUR Negative 11/18/2021 03:19 AM    UROBILINOGEN 0.2 11/18/2021 03:19 AM    BILIRUBINUR Negative 11/18/2021 03:19 AM    BLOODU SMALL 11/18/2021 03:19 AM    GLUCOSEU 500 11/18/2021 03:19 AM     ABG:  No results found for: PH, PCO2, PO2, HCO3, BE, THGB, TCO2, O2SAT  HgBA1c:    Lab Results   Component Value Date/Time    LABA1C 12.7 12/03/2022 12:09 PM     FLP:    Lab Results   Component Value Date/Time    TRIG 90 12/04/2022 05:30 AM    HDL 52 12/04/2022 05:30 AM    LDLCALC 85 12/04/2022 05:30 AM    LABVLDL 18 12/04/2022 05:30 AM     TSH:    Lab Results   Component Value Date/Time    TSH 1.470 12/03/2022 12:09 PM     VITAMIN B12: No components found for: B12  FOLATE:  No results found for: FOLATE  IRON:    Lab Results   Component Value Date/Time    IRON 25 12/04/2022 05:30 AM     Iron Saturation:  No components found for: PERCENTFE  TIBC:    Lab Results   Component Value Date/Time    TIBC 170 12/04/2022 05:30 AM     FERRITIN:    Lab Results   Component Value Date/Time    FERRITIN 1,986 12/04/2022 05:30 AM       RADIOLOGY:  No orders to display       ASSESSMENT:      Active Hospital Problems    Diagnosis     Hematemesis [K92.0]      Priority: Medium       PLAN:  Medications discussed with patient  GI prophylaxis  DVT prophylaxis  Apixaban on hold  PhosLo on hold  Rosuvastatin on hold  GIs been consulted for possible endoscopy  Consult to nephrology regarding dialysis  Consult pulmonary regarding anticoagulation  Lantus insulin 24 units daily  Low-dose sliding scale insulin  ProAmatine 10 mg daily as needed at dialysis  Procalcitonin  Admit to telemetry. Admit observation  Protonix 40 mg IV every day  Clear liquid diet  Beta hydroxybutyrate  Hemoccult stool  Brain natruretic peptide      Please note that over 50 minutes was spent .   Greater than 51% includes interviewing patient and reviewing chart; performing medical examination; ordering medications, tests and/or procedures; formulating assessment plan, reviewing rationale for the above recommendations; reviewing available records, results of all previously ordered tests and procedures and current problem list; counseling/educating the patient; coordinating care with other healthcare professionals; communicating results to the patient's family/caregiver; documenting clinical information in the electronic record          Ivy Scheuermann, DO    2:07 PM  1/17/2023    Voice recognition software use for dictation

## 2023-01-17 NOTE — CONSULTS
[facility] MD Jalen Painting MD Cherise Kohut, MD Deneen Plumb, CNP Crystal Lambling, YOSHI Bradley CNP  Consultation  Patient's Name: Sobeida Munguia  2:31 PM  1/17/2023    Nephrologist: Jalen Finn MD    Reason for Consult:  End-stage renal disease   Requesting Physician:  Coretta Ortiz DO    Chief Complaint:  Hematemesis     History Obtained From: Patient, chart    History of Present Ilness:         Mrs. Aleah Mcpherson is a pleasant 78 y.o. woman known to Dr. Dg Louie from the outpatient practice where he follows her for ESRD. She undergoes chronic intermittent hemodialysis on Tuesdays Thursdays and Saturdays at the [facility]. Recent treatments have been without complication. She did miss her treatment on Saturday because she was contemplating on whether or not to come to the emergency room for back pain. The pain subsided and she decided not to come to in for evaluation. Her last dialysis treatment was last Thursday. She presented to the hospital today with the complaint of hematemesis. She was getting ready for dialysis and began to have dry heaves and then vomited what appeared to have a small amount of blood. She was concerned because she is on Eliquis and decided to come in for further evaluation. She was recently hospitalized in early December for back pain which incidentally turned out to be related to a pulmonary embolus. She was started on Eliquis at that time. She is seen in the emergency room with her daughter present at the bedside. She is on nasal canula which she does wear chronically at home. She denies chest pain, palpitations, nausea, or diarrhea. She has not vomited since this morning. She does admit to shortness of breath with exertion.      Past Medical History:   Diagnosis Date    Acute pulmonary embolism ([address] 12/03/2022    Anemia in CKD (chronic kidney disease) 11/30/2021    Anxiety and depression 01/19/2022    At high risk for falls 2022    Brain aneurysm     CLABSI (central line-associated bloodstream infection) 2021    Cough 2022    Diabetes mellitus ([address] 2006    Diabetes mellitus type 2 with complications ([address]     Dizziness on standing 2022    ESRD (end stage renal disease) ([address] 2021    ESRD on hemodialysis ([address] 2021    Essential hypertension 2021    Fever, unspecified     Gastrointestinal hemorrhage 2021    H/O heart artery stent     Done in [place]    History of Clostridioides difficile colitis 2022    Hyperlipidemia     Hypertension     MSSA bacteremia 2021    Nausea & vomiting 2021    Obesity, Class III, BMI 40-49.9 (morbid obesity) ([address]     Periumbilical abdominal pain        Past Surgical History:   Procedure Laterality Date    CARDIAC CATHETERIZATION      Done in [facility] [address]      COLONOSCOPY      Negative findings    DIALYSIS FISTULA CREATION Left 2022    REVISION AV FISTULA LEFT ARM performed by Andrei Jarvis MD at [address] [place] [address]    PTCA  2015    PTCA [facility] ENDOSCOPY N/A 2021    EGD BIOPSY performed by Jeimy Renee MD at [address] [place] [address] N/A 2021    CATHETER INSERTION  TUNNELED HEMODIALYSIS, WITH REMOVAL OF TEMPORARY CATHETER performed by Andrei Jarvis MD at [address] [place] [address]       Family History   Problem Relation Age of Onset    Coronary Art Dis Mother          age 62 acute MI    Coronary Art Dis Father          age 62 CVA    Coronary Art Dis Brother         reports that she quit smoking about 37 years ago. Her smoking use included cigarettes. She started smoking about 49 years ago. She has a 12.00 pack-year smoking history. She has never used smokeless tobacco. She reports that she does not drink alcohol and does not use drugs.     Allergies:  Pcn [penicillins], Benzonatate, Morphine, and Sodium hypochlorite    Current Medications:    No current facility-administered medications for this encounter. Review of Systems:   Constitutional: negative for chills, fatigue, fevers, and malaise  Eyes: negative for icterus, irritation, redness, and visual disturbance  Ears, nose, mouth, throat, and face: negative for ear drainage, earaches, hearing loss, nasal congestion, sore mouth, sore throat, and tinnitus  Respiratory: negative for cough, + dyspnea on exertion, denies hemoptysis, shortness of breath, and wheezing  Cardiovascular: negative for chest pain, chest pressure/discomfort, dyspnea, and palpitations  Gastrointestinal: negative for abdominal pain, diarrhea, melena, nausea, + vomiting  Genitourinary: negative for dysuria, frequency, and hematuria  Integument/breast: negative for pruritus, rash, and skin lesion(s)  Hematologic/lymphatic: negative for bleeding, easy bruising, and petechiae  Musculoskeletal: negative for arthralgias, muscle weakness, myalgias, and stiff joints  Neurological: negative for dizziness, headaches, vertigo, and weakness  Behavioral/Psych: negative for anxiety, depression, and irritability    Physical exam:  General Appearance:  awake, alert, oriented, in no acute distress  Skin:  Skin color, texture, turgor normal. No rashes or lesions. Eyes:  PERRL, EOMI, Sclera nonicteric, and Conjunctiva clear  Ears:  canals and TMs intact  Nose/Sinuses:  Nares normal. Septum midline. Mucosa normal. No drainage or sinus tenderness. Mouth/Throat:  Mucosa moist.  No lesions. Neck:  neck- supple, no mass, non-tender  Lungs:  Normal expansion. Clear to auscultation, diminished in the bases. No rales, rhonchi, or wheezing. Heart:   Regular rate and rhythm without murmur, gallop or rub. Abdomen:  Soft, non-tender, normal bowel sounds. No bruits, organomegaly or masses. Abdomen distended   Extremities: Extremities warm to touch, pink, with +2 BLE edema.   Musculoskeletal:  No joint swelling, deformity, or tenderness. Peripheral Pulses:  Normal  Neurologic: Sensation grossly intact.         Data:   Labs:  CBC with Differential:    Lab Results   Component Value Date/Time    WBC 12.6 01/17/2023 08:35 AM    RBC 2.98 01/17/2023 08:35 AM    HGB 9.3 01/17/2023 08:35 AM    HCT 28.6 01/17/2023 08:35 AM     01/17/2023 08:35 AM    MCV 96.0 01/17/2023 08:35 AM    MCH 31.2 01/17/2023 08:35 AM    MCHC 32.5 01/17/2023 08:35 AM    RDW 14.2 01/17/2023 08:35 AM    NRBC 0.0 01/13/2022 04:38 AM    LYMPHOPCT 6.8 01/17/2023 08:35 AM    MONOPCT 4.2 01/17/2023 08:35 AM    BASOPCT 0.3 01/17/2023 08:35 AM    MONOSABS 0.53 01/17/2023 08:35 AM    LYMPHSABS 0.86 01/17/2023 08:35 AM    EOSABS 0.73 01/17/2023 08:35 AM    BASOSABS 0.04 01/17/2023 08:35 AM     CMP:    Lab Results   Component Value Date/Time     01/17/2023 08:35 AM    K 5.3 01/17/2023 08:35 AM    K 4.2 08/23/2022 12:12 PM    CL 93 01/17/2023 08:35 AM    CO2 23 01/17/2023 08:35 AM    BUN 89 01/17/2023 08:35 AM    CREATININE 8.6 01/17/2023 08:35 AM    GFRAA 8 09/22/2022 12:14 PM    LABGLOM 5 01/17/2023 08:35 AM    GLUCOSE 472 01/17/2023 08:35 AM    PROT 7.2 01/17/2023 08:35 AM    LABALBU 3.8 01/17/2023 08:35 AM    CALCIUM 8.4 01/17/2023 08:35 AM    BILITOT 0.4 01/17/2023 08:35 AM    ALKPHOS 101 01/17/2023 08:35 AM    AST 9 01/17/2023 08:35 AM    ALT 9 01/17/2023 08:35 AM     Ionized Calcium:  No results found for: IONCA  Magnesium:    Lab Results   Component Value Date/Time    MG 2.2 12/06/2022 02:47 AM     Phosphorus:    Lab Results   Component Value Date/Time    PHOS 5.5 12/06/2022 02:47 AM     U/A:    Lab Results   Component Value Date/Time    COLORU Yellow 11/18/2021 03:19 AM    PHUR 6.0 11/18/2021 03:19 AM    WBCUA 1-3 11/18/2021 03:19 AM    RBCUA 0-1 11/18/2021 03:19 AM    BACTERIA FEW 11/18/2021 03:19 AM    CLARITYU Clear 11/18/2021 03:19 AM    SPECGRAV 1.020 11/18/2021 03:19 AM    LEUKOCYTESUR Negative 11/18/2021 03:19 AM    UROBILINOGEN 0.2 11/18/2021 03:19 AM    BILIRUBINUR Negative 11/18/2021 03:19 AM    BLOODU SMALL 11/18/2021 03:19 AM    GLUCOSEU 500 11/18/2021 03:19 AM     Microalbumen/Creatinine ratio:  No components found for: RUCREAT  Iron Saturation:  No components found for: PERCENTFE  TIBC:    Lab Results   Component Value Date/Time    TIBC 170 12/04/2022 05:30 AM     FERRITIN:    Lab Results   Component Value Date/Time    FERRITIN 1,986 12/04/2022 05:30 AM        Imaging:  No orders to display       Assessment  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, renal microvascular atherosclerotic disease, and history of acute kidney injury due to contrast-induced nephropathy  Anemia due to ESRD  Secondary hyperparathyroidism/mineral bone disease due to ESRD  History of hypertension, typically well controlled    Plan  Continue IHD support for solute and volume clearance on Tuesdays Thursdays and Saturdays as per outpatient orders and dry weight  Continue JHONATAN: Retacrit while here  Continue other aspects of renal management  Phosphorus level tomorrow and resume phosphorus binder accordingly   Follow labs, BMP  Continue supportive care      Thank you for the opportunity to participate in the care of your pleasant patient. We look forward to following along with you.         Electronically signed by TAWNYA Garrido CNP on 1/17/2023 at 2:31 PM

## 2023-01-17 NOTE — FLOWSHEET NOTE
Pt completed 3:28 of HD on a 2K bath and signed off tx with 28min remaining. Total Blood volume processed was 82.6L.   01/17/23 1645   Vital Signs   BP (!) 107/50   Temp 98.2 °F (36.8 °C)   Heart Rate 52   Resp 18   Weight 255 lb 1.2 oz (115.7 kg)   Weight Method Estimated   Percent Weight Change -1.87   Pain Assessment   Pain Assessment 0-10   Pain Level 5   Pain Type Acute pain   Pain Location Other (Comment)  (back)   Pain Orientation Lower   Pain Descriptors Aching   Pain Frequency Intermittent   Post-Hemodialysis Assessment   Post-Treatment Procedures Blood returned; Access bleeding time < 10 minutes   Machine Disinfection Process Acid/Vinegar Clean;Heat Disinfect; Exterior Machine Disinfection   Duration of Treatment (minutes) 210 minutes   Hemodialysis Intake (ml) 300 ml   Hemodialysis Output (ml) 2500 ml   NET Removed (ml) 2200   Tolerated Treatment Fair   Patient Response to Treatment pt signed off AMA with 28min remaining of tx; blood returned; statsis achieved; post report to [facility] RN   Bilateral Breath Sounds Clear   Edema Generalized   Time Off 1632   Patient Disposition Return to room

## 2023-01-17 NOTE — CONSULTS
Pulmonary Consultation    Admit Date: 1/17/2023  Requesting Physician: Ivett Arceo MD    Reason for consultation:  Hematemesis with pulmonary embolism  HPI:  The patient is a 79-year-old female with a history of end-stage renal disease who was seen by our practice in December of last year, little more than a month ago, when she had presented with significant chest and back pain. A CTA of the chest was obtained evidencing pulmonary emboli in the right lower lobe. The left lung was clean. A representative image is seen below on the left-hand side. A previous image shows evidence of no embolism. Admitted, the patient was placed on apixaban at its normal doses, 10 mg twice daily for the first 7 days followed by 5 mg twice daily. Today, the patient presents after a bout of retching at home. She vomited a small amount of blood. Previously, in 2021, the patient had undergone EGD which evidenced gastritis. There is no hemodynamic stability or massive hematemesis. Oxygenation is good. Currently, the patient is in dialysis. She is having a \"panic attack\" so history is kind of limited.   The amount of hematemesis seems relatively modest.    PMH:    Past Medical History:   Diagnosis Date    Acute pulmonary embolism ([address] 12/03/2022    Anemia in CKD (chronic kidney disease) 11/30/2021    Anxiety and depression 01/19/2022    At high risk for falls 01/19/2022    Brain aneurysm     CLABSI (central line-associated bloodstream infection) 11/19/2021    Cough 01/19/2022    Diabetes mellitus ([address] 2006    Diabetes mellitus type 2 with complications ([address] 50/32/8954    Dizziness on standing 01/19/2022    ESRD (end stage renal disease) ([address] 11/19/2021    ESRD on hemodialysis ([address] 11/19/2021    Essential hypertension 11/30/2021    Fever, unspecified     Gastrointestinal hemorrhage 11/30/2021    H/O heart artery stent 2015    Done in [place]    History of Clostridioides difficile colitis 01/19/2022    Hyperlipidemia     Hypertension     MSSA bacteremia 11/18/2021    Nausea & vomiting 11/18/2021    Obesity, Class III, BMI 40-49.9 (morbid obesity) ([facility] 88/68/5142    Periumbilical abdominal pain      PSH:   Past Surgical History:   Procedure Laterality Date    CARDIAC CATHETERIZATION  2015    Done in [address]  2017    Negative findings    DIALYSIS FISTULA CREATION Left 01/28/2022    REVISION AV FISTULA LEFT ARM performed by Wendy Heaton MD at [address]    PTCA  2015    PTCA [facility] N/A 12/01/2021    EGD BIOPSY performed by Magen Funk MD at [address] [place] [address] N/A 11/24/2021    CATHETER INSERTION  TUNNELED HEMODIALYSIS, WITH REMOVAL OF TEMPORARY CATHETER performed by Wendy Heaton MD at [address] [place] [address] Road [address] of Systems:     Constitutional: As noted in the HPI. Eyes: No visual changes or diplopia. No scleral icterus. ENT: No headaches, hearing loss or vertigo. No nasal congestion, or sore throat. Cardiovascular: No chest pain, dyspnea on exertion, or palpitations. Respiratory: See above  Gastrointestinal: See above  Genitourinary: No dysuria, urinary frequency, or incontinence. No hematuria. Musculoskeletal: No gait disturbance, weakness or joint complaints. Integumentary: No rash or pruritis. No abnormal pigmentation, hair or nail changes. Neurological: No headache, diplopia, dizziness, tremor, change in muscle strength, numbness or tingling. No change in gait, balance, coordination, mood, affect, memory, mentation, behavior. Psychiatric: No anxiety or depression. Endocrine: No temperature intolerance, excessive thirst, fluid intake, urinary frequency, excessive appetite, or recent weight change. Hematologic/Lymphatic: No abnormal bruising or bleeding, blood clots or swollen lymph nodes.  No anemia, fever, chills, night sweats, or swollen glands. Allergic/Immunologic: No seasonal or perenial allergies. No history of hives or atopic dermatitis. Social History:  Alcohol: No  Tobacco:   No  Employment:  no silica or asbestos exposure  Family:  No family history of lung disease    Medications:     [START ON 2023] epoetin sadia-epbx  30 Units/kg SubCUTAneous Once per day on        Vitals:  Tmax:  VITALS:  /81   Pulse 91   Temp 98.2 °F (36.8 °C)   Resp 18   Ht 5' 4\" (1.626 m)   Wt 259 lb 14.8 oz (117.9 kg) Comment: chart  SpO2 97%   BMI 44.62 kg/m²   24HR INTAKE/OUTPUT:  No intake or output data in the 24 hours ending 23 1522  CURRENT PULSE OXIMETRY:  SpO2: 97 %  24HR PULSE OXIMETRY RANGE:  SpO2  Av.5 %  Min: 97 %  Max: 98 %    EXAM:  General: No distress. Alert. Eyes: PERRL. No sclera icterus. No conjunctival injection. ENT: No discharge. Pharynx clear. Neck: Trachea midline. Normal thyroid. No jvd, no hjr. Resp: No wheezing. No accessory muscle use. No rales. No rhonchi. CV: Regular rate. Regular rhythm. No murmur No rub. Abd: Non-tender. Non-distended. No masses. No organmegaly. Normal bowel sounds. Skin: Warm and dry. No nodule on exposed extremities. No rash on exposed extremities. Obese. Lymph: No cervical LAD. No supraclavicular LAD. Multiple open skin lesions. Wall  Ext: No joint deformity. No clubbing. No cyanosis. No edema  Neuro: Awake. Follows commands. Positive pupils/gag/corneals. Normal pain response.      Lab Results:  CBC:   Recent Labs     23  0835   WBC 12.6*   HGB 9.3*   HCT 28.6*   MCV 96.0          BMP:  Recent Labs     23  0835      K 5.3*   CL 93*   CO2 23   BUN 89*   CREATININE 8.6*    ALB:3,BILIDIR:3,BILITOT:3,ALKPHOS:3)@    PT/INR:   Recent Labs     23  0835   PROTIME 16.8*   INR 1.5       Cultures:  Sputum: not available  Blood: not available    ABG:   No results for input(s): PH, PO2, PCO2, HCO3, BE, O2SAT, METHB, O2HB, COHB, O2CON, HHB, THB in the last 72 hours. Films:     No orders to display   . Assessment:  Hematemesis: Possibly gastritis versus peptic ulcer disease. Hemoglobin appears adequate. End-stage renal disease, on dialysis  Recent pulmonary embolism with therapeutic anticoagulation with apixaban      Plan:  Hold apixaban for now there is no indication for a reversal agent await GI input and possible endoscopy to determine whether or not apixaban can be resumed  Dialysis per renal      Thanks for letting us see this patient in consultation. Total time in reviewing the previous admissions and records, reviewing the current x-rays, labs, and discussing with clinical staff including nursing and physicians, exceeded 50 minutes. Please contact us with any questions. Office [phone] or after hours through [employer], x [phone]. Please note that voice recognition technology was used (while wearing a Covid mandated mask) in the preparation of this note and make therefore it may contain inadvertent transcription errors. If the patient is a COVID 19 isolation patient, the above physical exam reflects that of the examining physician for the day. Melvi Holt MD,  M.D., F.C.C.P.     [facility], [address] [place] [address], [address] [facility]  Office Visits:  [place]

## 2023-01-18 ENCOUNTER — ANESTHESIA (OUTPATIENT)
Dept: ENDOSCOPY | Age: 68
End: 2023-01-18
Payer: MEDICARE

## 2023-01-18 ENCOUNTER — ANESTHESIA EVENT (OUTPATIENT)
Dept: ENDOSCOPY | Age: 68
End: 2023-01-18
Payer: MEDICARE

## 2023-01-18 LAB
ALBUMIN SERPL-MCNC: 3.5 G/DL (ref 3.5–5.2)
ALP BLD-CCNC: 83 U/L (ref 35–104)
ALT SERPL-CCNC: 7 U/L (ref 0–32)
ANION GAP SERPL CALCULATED.3IONS-SCNC: 11 MMOL/L (ref 7–16)
AST SERPL-CCNC: 11 U/L (ref 0–31)
BASOPHILS ABSOLUTE: 0.05 E9/L (ref 0–0.2)
BASOPHILS RELATIVE PERCENT: 0.6 % (ref 0–2)
BILIRUB SERPL-MCNC: 0.5 MG/DL (ref 0–1.2)
BILIRUBIN DIRECT: <0.2 MG/DL (ref 0–0.3)
BILIRUBIN, INDIRECT: NORMAL MG/DL (ref 0–1)
BUN BLDV-MCNC: 34 MG/DL (ref 6–23)
CALCIUM IONIZED: 1.08 MMOL/L (ref 1.15–1.33)
CALCIUM SERPL-MCNC: 8.6 MG/DL (ref 8.6–10.2)
CHLORIDE BLD-SCNC: 94 MMOL/L (ref 98–107)
CHOLESTEROL, TOTAL: 155 MG/DL (ref 0–199)
CO2: 32 MMOL/L (ref 22–29)
CREAT SERPL-MCNC: 4.7 MG/DL (ref 0.5–1)
EOSINOPHILS ABSOLUTE: 0.82 E9/L (ref 0.05–0.5)
EOSINOPHILS RELATIVE PERCENT: 9.4 % (ref 0–6)
FERRITIN: 1513 NG/ML
GFR SERPL CREATININE-BSD FRML MDRD: 10 ML/MIN/1.73
GLUCOSE BLD-MCNC: 198 MG/DL (ref 74–99)
HCT VFR BLD CALC: 27.4 % (ref 34–48)
HCT VFR BLD CALC: 29.1 % (ref 34–48)
HDLC SERPL-MCNC: 41 MG/DL
HEMOGLOBIN: 9 G/DL (ref 11.5–15.5)
HEMOGLOBIN: 9.6 G/DL (ref 11.5–15.5)
IMMATURE GRANULOCYTES #: 0.05 E9/L
IMMATURE GRANULOCYTES %: 0.6 % (ref 0–5)
INR BLD: 1.4
IRON SATURATION: 20 % (ref 15–50)
IRON: 35 MCG/DL (ref 37–145)
LACTIC ACID, SEPSIS: 1.2 MMOL/L (ref 0.5–1.9)
LDL CHOLESTEROL CALCULATED: 79 MG/DL (ref 0–99)
LYMPHOCYTES ABSOLUTE: 1.09 E9/L (ref 1.5–4)
LYMPHOCYTES RELATIVE PERCENT: 12.6 % (ref 20–42)
MAGNESIUM: 2 MG/DL (ref 1.6–2.6)
MCH RBC QN AUTO: 31.7 PG (ref 26–35)
MCHC RBC AUTO-ENTMCNC: 32.8 % (ref 32–34.5)
MCV RBC AUTO: 96.5 FL (ref 80–99.9)
METER GLUCOSE: 161 MG/DL (ref 74–99)
METER GLUCOSE: 199 MG/DL (ref 74–99)
METER GLUCOSE: 200 MG/DL (ref 74–99)
METER GLUCOSE: 256 MG/DL (ref 74–99)
MONOCYTES ABSOLUTE: 0.49 E9/L (ref 0.1–0.95)
MONOCYTES RELATIVE PERCENT: 5.6 % (ref 2–12)
NEUTROPHILS ABSOLUTE: 6.18 E9/L (ref 1.8–7.3)
NEUTROPHILS RELATIVE PERCENT: 71.2 % (ref 43–80)
PDW BLD-RTO: 14.3 FL (ref 11.5–15)
PHOSPHORUS: 4.4 MG/DL (ref 2.5–4.5)
PLATELET # BLD: 154 E9/L (ref 130–450)
PMV BLD AUTO: 10.1 FL (ref 7–12)
POTASSIUM SERPL-SCNC: 3.7 MMOL/L (ref 3.5–5)
PROTHROMBIN TIME: 15.8 SEC (ref 9.3–12.4)
RBC # BLD: 2.84 E12/L (ref 3.5–5.5)
SODIUM BLD-SCNC: 137 MMOL/L (ref 132–146)
TOTAL IRON BINDING CAPACITY: 173 MCG/DL (ref 250–450)
TOTAL PROTEIN: 6.7 G/DL (ref 6.4–8.3)
TRIGL SERPL-MCNC: 173 MG/DL (ref 0–149)
VLDLC SERPL CALC-MCNC: 35 MG/DL
WBC # BLD: 8.7 E9/L (ref 4.5–11.5)

## 2023-01-18 PROCEDURE — 2700000000 HC OXYGEN THERAPY PER DAY

## 2023-01-18 PROCEDURE — 85025 COMPLETE CBC W/AUTO DIFF WBC: CPT

## 2023-01-18 PROCEDURE — G0378 HOSPITAL OBSERVATION PER HR: HCPCS

## 2023-01-18 PROCEDURE — 6370000000 HC RX 637 (ALT 250 FOR IP): Performed by: INTERNAL MEDICINE

## 2023-01-18 PROCEDURE — 37191 INS ENDOVAS VENA CAVA FILTR: CPT | Performed by: SURGERY

## 2023-01-18 PROCEDURE — 83540 ASSAY OF IRON: CPT

## 2023-01-18 PROCEDURE — 87081 CULTURE SCREEN ONLY: CPT

## 2023-01-18 PROCEDURE — 6360000002 HC RX W HCPCS: Performed by: INTERNAL MEDICINE

## 2023-01-18 PROCEDURE — 97165 OT EVAL LOW COMPLEX 30 MIN: CPT

## 2023-01-18 PROCEDURE — 2580000003 HC RX 258: Performed by: NURSE ANESTHETIST, CERTIFIED REGISTERED

## 2023-01-18 PROCEDURE — 84100 ASSAY OF PHOSPHORUS: CPT

## 2023-01-18 PROCEDURE — 3609012400 HC EGD TRANSORAL BIOPSY SINGLE/MULTIPLE: Performed by: INTERNAL MEDICINE

## 2023-01-18 PROCEDURE — A4216 STERILE WATER/SALINE, 10 ML: HCPCS | Performed by: INTERNAL MEDICINE

## 2023-01-18 PROCEDURE — 36415 COLL VENOUS BLD VENIPUNCTURE: CPT

## 2023-01-18 PROCEDURE — C9113 INJ PANTOPRAZOLE SODIUM, VIA: HCPCS | Performed by: INTERNAL MEDICINE

## 2023-01-18 PROCEDURE — 0DB68ZX EXCISION OF STOMACH, VIA NATURAL OR ARTIFICIAL OPENING ENDOSCOPIC, DIAGNOSTIC: ICD-10-PCS | Performed by: SURGERY

## 2023-01-18 PROCEDURE — 85014 HEMATOCRIT: CPT

## 2023-01-18 PROCEDURE — 82728 ASSAY OF FERRITIN: CPT

## 2023-01-18 PROCEDURE — 6360000002 HC RX W HCPCS: Performed by: NURSE ANESTHETIST, CERTIFIED REGISTERED

## 2023-01-18 PROCEDURE — 88305 TISSUE EXAM BY PATHOLOGIST: CPT

## 2023-01-18 PROCEDURE — 85610 PROTHROMBIN TIME: CPT

## 2023-01-18 PROCEDURE — 80048 BASIC METABOLIC PNL TOTAL CA: CPT

## 2023-01-18 PROCEDURE — 3700000000 HC ANESTHESIA ATTENDED CARE: Performed by: INTERNAL MEDICINE

## 2023-01-18 PROCEDURE — 7100000010 HC PHASE II RECOVERY - FIRST 15 MIN: Performed by: INTERNAL MEDICINE

## 2023-01-18 PROCEDURE — 82962 GLUCOSE BLOOD TEST: CPT

## 2023-01-18 PROCEDURE — 83605 ASSAY OF LACTIC ACID: CPT

## 2023-01-18 PROCEDURE — 2580000003 HC RX 258: Performed by: INTERNAL MEDICINE

## 2023-01-18 PROCEDURE — 80061 LIPID PANEL: CPT

## 2023-01-18 PROCEDURE — 96372 THER/PROPH/DIAG INJ SC/IM: CPT

## 2023-01-18 PROCEDURE — 96376 TX/PRO/DX INJ SAME DRUG ADON: CPT

## 2023-01-18 PROCEDURE — 83550 IRON BINDING TEST: CPT

## 2023-01-18 PROCEDURE — 83735 ASSAY OF MAGNESIUM: CPT

## 2023-01-18 PROCEDURE — 82330 ASSAY OF CALCIUM: CPT

## 2023-01-18 PROCEDURE — 2709999900 HC NON-CHARGEABLE SUPPLY: Performed by: INTERNAL MEDICINE

## 2023-01-18 PROCEDURE — 97161 PT EVAL LOW COMPLEX 20 MIN: CPT

## 2023-01-18 PROCEDURE — 80076 HEPATIC FUNCTION PANEL: CPT

## 2023-01-18 PROCEDURE — 85018 HEMOGLOBIN: CPT

## 2023-01-18 PROCEDURE — 7100000011 HC PHASE II RECOVERY - ADDTL 15 MIN: Performed by: INTERNAL MEDICINE

## 2023-01-18 RX ORDER — SODIUM CHLORIDE 0.9 % (FLUSH) 0.9 %
5-40 SYRINGE (ML) INJECTION PRN
Status: DISCONTINUED | OUTPATIENT
Start: 2023-01-18 | End: 2023-01-18 | Stop reason: HOSPADM

## 2023-01-18 RX ORDER — SODIUM CHLORIDE 9 MG/ML
INJECTION, SOLUTION INTRAVENOUS CONTINUOUS PRN
Status: DISCONTINUED | OUTPATIENT
Start: 2023-01-18 | End: 2023-01-18 | Stop reason: SDUPTHER

## 2023-01-18 RX ORDER — SODIUM CHLORIDE 9 MG/ML
INJECTION, SOLUTION INTRAVENOUS PRN
Status: DISCONTINUED | OUTPATIENT
Start: 2023-01-18 | End: 2023-01-18 | Stop reason: HOSPADM

## 2023-01-18 RX ORDER — SODIUM CHLORIDE 0.9 % (FLUSH) 0.9 %
5-40 SYRINGE (ML) INJECTION EVERY 12 HOURS SCHEDULED
Status: DISCONTINUED | OUTPATIENT
Start: 2023-01-18 | End: 2023-01-18 | Stop reason: HOSPADM

## 2023-01-18 RX ORDER — MEPERIDINE HYDROCHLORIDE 25 MG/ML
12.5 INJECTION INTRAMUSCULAR; INTRAVENOUS; SUBCUTANEOUS EVERY 5 MIN PRN
Status: DISCONTINUED | OUTPATIENT
Start: 2023-01-18 | End: 2023-01-18 | Stop reason: HOSPADM

## 2023-01-18 RX ORDER — ACETAMINOPHEN 325 MG/1
650 TABLET ORAL EVERY 6 HOURS PRN
Status: DISCONTINUED | OUTPATIENT
Start: 2023-01-18 | End: 2023-01-21 | Stop reason: HOSPADM

## 2023-01-18 RX ORDER — MORPHINE SULFATE 2 MG/ML
1 INJECTION, SOLUTION INTRAMUSCULAR; INTRAVENOUS EVERY 5 MIN PRN
Status: DISCONTINUED | OUTPATIENT
Start: 2023-01-18 | End: 2023-01-18 | Stop reason: HOSPADM

## 2023-01-18 RX ORDER — MORPHINE SULFATE 2 MG/ML
2 INJECTION, SOLUTION INTRAMUSCULAR; INTRAVENOUS EVERY 5 MIN PRN
Status: DISCONTINUED | OUTPATIENT
Start: 2023-01-18 | End: 2023-01-18 | Stop reason: HOSPADM

## 2023-01-18 RX ORDER — PROPOFOL 10 MG/ML
INJECTION, EMULSION INTRAVENOUS PRN
Status: DISCONTINUED | OUTPATIENT
Start: 2023-01-18 | End: 2023-01-18 | Stop reason: SDUPTHER

## 2023-01-18 RX ADMIN — SODIUM CHLORIDE 40 MG: 9 INJECTION, SOLUTION INTRAMUSCULAR; INTRAVENOUS; SUBCUTANEOUS at 08:27

## 2023-01-18 RX ADMIN — INSULIN GLARGINE 24 UNITS: 100 INJECTION, SOLUTION SUBCUTANEOUS at 20:11

## 2023-01-18 RX ADMIN — EPOETIN ALFA-EPBX 3520 UNITS: 4000 INJECTION, SOLUTION INTRAVENOUS; SUBCUTANEOUS at 08:27

## 2023-01-18 RX ADMIN — ACETAMINOPHEN 650 MG: 325 TABLET ORAL at 12:09

## 2023-01-18 RX ADMIN — SODIUM CHLORIDE: 9 INJECTION, SOLUTION INTRAVENOUS at 14:42

## 2023-01-18 RX ADMIN — PROPOFOL 250 MG: 10 INJECTION, EMULSION INTRAVENOUS at 14:46

## 2023-01-18 ASSESSMENT — LIFESTYLE VARIABLES: SMOKING_STATUS: 0

## 2023-01-18 ASSESSMENT — PAIN SCALES - GENERAL
PAINLEVEL_OUTOF10: 0

## 2023-01-18 ASSESSMENT — PAIN DESCRIPTION - LOCATION: LOCATION: ARM;SHOULDER

## 2023-01-18 NOTE — PROGRESS NOTES
Spoke with Dr. Mariann Mckinnon regarding tylenol administration prior to EGD. He states patient can take med prior to procedure.

## 2023-01-18 NOTE — PROGRESS NOTES
[facility] Roberto A. Birdena Hashimoto, MD Julianna Bunde, MD Jf Kelley, MD Viji Doherty, CNP   Jaja Echavarria, YOSHI Lozada, CASH  Progress Note    1/18/2023    SUBJECTIVE:   1/18: Sitting up in the chair. No acute complaints. She signed off hemodialysis yesterday due to discomfort from being on the hospital [facility]. She does not think that there will be plans for a EGD and hopes to be discharged. Eliquis is on hold until a decision is made. No acute complaints. ROS is unremarkable. PROBLEM LIST:    Principal Problem:    Hematemesis  Resolved Problems:    * No resolved hospital problems.  *         DIET:    Diet NPO     MEDS (scheduled):    insulin glargine  24 Units SubCUTAneous Nightly    insulin lispro  0-4 Units SubCUTAneous TID WC    insulin lispro  0-4 Units SubCUTAneous Nightly    epoetin sadia-epbx  30 Units/kg SubCUTAneous Once per day on Mon Wed Fri    pantoprazole (PROTONIX) 40 mg injection  40 mg IntraVENous Daily       MEDS (infusions):   dextrose         MEDS (prn):  acetaminophen, midodrine, glucose, dextrose bolus **OR** dextrose bolus, glucagon (rDNA), dextrose, diphenhydrAMINE    PHYSICAL EXAM:     Patient Vitals for the past 24 hrs:   BP Temp Temp src Pulse Resp SpO2 Weight   01/18/23 0710 (!) 123/52 97.9 °F (36.6 °C) Oral 67 18 100 % --   01/18/23 0036 -- -- -- -- -- -- 255 lb 3.2 oz (115.8 kg)   01/17/23 2030 (!) 132/54 98.1 °F (36.7 °C) Oral 97 20 98 % --   01/17/23 1745 96/80 98.2 °F (36.8 °C) Oral 99 20 97 % --   01/17/23 1645 (!) 107/50 98.2 °F (36.8 °C) -- 52 18 -- 255 lb 1.2 oz (115.7 kg)   01/17/23 1600 (!) 127/56 -- -- 69 -- -- --   01/17/23 1530 110/74 -- -- 93 -- -- --   01/17/23 1500 130/81 -- -- 91 -- -- --   01/17/23 1430 114/78 -- -- 67 -- -- --   01/17/23 1400 (!) 151/78 -- -- 73 -- -- --   01/17/23 1330 (!) 155/97 -- -- 92 -- -- --   01/17/23 1300 (!) 157/92 -- -- 67 -- -- --   01/17/23 1240 105/79 98.2 °F (36.8 °C) -- 81 18 -- 259 lb 14.8 oz (117.9 kg)   01/17/23 1018 (!) 153/80 -- -- 92 18 97 % --   @      Intake/Output Summary (Last 24 hours) at 1/18/2023 0935  Last data filed at 1/17/2023 1645  Gross per 24 hour   Intake 300 ml   Output 2500 ml   Net -2200 ml         Wt Readings from Last 3 Encounters:   01/18/23 255 lb 3.2 oz (115.8 kg)   12/22/22 261 lb (118.4 kg)   12/13/22 250 lb (113.4 kg)       Constitutional:  in no acute distress  HEENT: NC/AT, EOMI, sclera and conjunctiva are clear and anicteric, mucus membranes moist  Neck: Trachea midline, no JVD  Cardiovascular: S1, S2 regular rhythm, no murmur,or rub  Respiratory: CTAB, diminished in the bases. No crackles, no wheeze  Gastrointestinal:  Soft, nontender, nondistended, NABS  Ext: +2 BLE edema, feet warm  Skin: dry, no rash.  Red/jefferson BLE  Neuro: awake, alert, interactive      DATA:    Recent Labs     01/17/23  0835 01/17/23  1835 01/18/23  0140   WBC 12.6*  --   --    HGB 9.3* 9.5* 9.6*   HCT 28.6* 28.9* 29.1*   MCV 96.0  --   --      --   --      Recent Labs     01/17/23  0835 01/17/23  1835 01/18/23  0355     --  137   K 5.3*  --  3.7   CL 93*  --  94*   CO2 23  --  32*   MG  --   --  2.0   PHOS  --  3.2 4.4   BUN 89*  --  34*   CREATININE 8.6*  --  4.7*   ALT 9  --  7   AST 9  --  11   BILIDIR  --   --  <0.2   BILITOT 0.4  --  0.5   ALKPHOS 101  --  83       No results found for: LABPROT    ASSESSMENT:  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, renal microvascular atherosclerotic disease, and history of acute kidney injury due to contrast-induced nephropathy  Anemia due to ESRD  Secondary hyperparathyroidism/mineral bone disease due to ESRD  History of hypertension, typically well controlled    Plan  Continue IHD support for solute and volume clearance on Tuesdays Thursdays and Saturdays as per outpatient orders and dry weight  Continue JHONATAN: Retacrit while here  Continue other aspects of renal management  Phosphorus level tomorrow and resume phosphorus binder accordingly   Follow labs, BMP  Continue supportive care      Electronically signed by TAWNYA Chowdary CNP on 1/18/2023 at 9:35 AM

## 2023-01-18 NOTE — PROGRESS NOTES
[facility]  [address], [address], [address]       Pulmonary Progress Note      SUBJECTIVE:  sitting up in chair on 3 li NC, claims doing ok with breathing.  No vomiting at all since in hospital, no issues with cough/congestion    OBJECTIVE    Medications    Continuous Infusions:   dextrose         Scheduled Meds:   insulin glargine  24 Units SubCUTAneous Nightly    insulin lispro  0-4 Units SubCUTAneous TID WC    insulin lispro  0-4 Units SubCUTAneous Nightly    epoetin sadia-epbx  30 Units/kg SubCUTAneous Once per day on     pantoprazole (PROTONIX) 40 mg injection  40 mg IntraVENous Daily       PRN Meds:midodrine, glucose, dextrose bolus **OR** dextrose bolus, glucagon (rDNA), dextrose, diphenhydrAMINE    Physical    VITALS:  BP (!) 123/52   Pulse 67   Temp 97.9 °F (36.6 °C) (Oral)   Resp 18   Ht 5' 4\" (1.626 m)   Wt 255 lb 3.2 oz (115.8 kg)   SpO2 100%   BMI 43.80 kg/m²     24HR INTAKE/OUTPUT:      Intake/Output Summary (Last 24 hours) at 2023 0833  Last data filed at 2023 1645  Gross per 24 hour   Intake 300 ml   Output 2500 ml   Net -2200 ml       24HR PULSE OXIMETRY RANGE:    SpO2  Av %  Min: 97 %  Max: 100 %    General appearance: alert, appears stated age, and cooperative  Lungs: clear to auscultation bilaterally  Heart: regular rate and rhythm, S1, S2 normal, no murmur, click, rub or gallop  Abdomen: soft, non-tender; bowel sounds normal; no masses,  no organomegaly  Extremities: extremities normal, atraumatic, no cyanosis or edema  Neurologic: Mental status: Alert, oriented, thought content appropriate    Data    CBC:   Recent Labs     23  0835 23  18323  0140   WBC 12.6*  --   --    HGB 9.3* 9.5* 9.6*   HCT 28.6* 28.9* 29.1*   MCV 96.0  --   --      --   --        BMP:  Recent Labs     23  0835 23  18323  0355     --  137   K 5.3*  --  3.7 CL 93*  --  94*   CO2 23  --  32*   PHOS  --  3.2 4.4   BUN 89*  --  34*   CREATININE 8.6*  --  4.7*    ALB:3,BILIDIR:3,BILITOT:3,ALKPHOS:3)@    PT/INR:   Recent Labs     01/17/23  0835   PROTIME 16.8*   INR 1.5       ABG:   No results for input(s): PH, PO2, PCO2, HCO3, BE, O2SAT, METHB, O2HB, COHB, O2CON, HHB, THB in the last 72 hours. Radiology/Other tests reviewed: none    Assessment:     Principal Problem:    Hematemesis  Resolved Problems:    * No resolved hospital problems. *  PE  ESRD on HD      Plan:       Await GI input  Hold off on eliquis until cleared for resumption or not  HD as per Renal  Cont with oxygen, taper as tolerated, was on 2-3 li as out-pt  OOB to chair, ambulate as tolerated      Time at the bedside, reviewing labs and radiographs, reviewing notes and consultations, discussing with staff and family was more than 35 minutes. Thanks for letting us see this patient in consultation. Please contact us with any questions. Office [phone] or after hours through [employer], x [phone].

## 2023-01-18 NOTE — CONSULTS
Gastroenterology Consult Note   Shiloh BOWLING, NP-C with Westley Hedrick M.D. Consult Note        Date of Service: 1/18/2023  Reason for Consult: Hematemesis, on apixaban for pulmonary embolus  Requesting Physician: Milton Anne DO    CHIEF COMPLAINT:  dark brown emesis    History Obtained From:  patient, electronic medical record    HISTORY OF PRESENT ILLNESS:       Jenelle Powell is a 78 y.o. female with significant past medical history of PE, anemia, CKD, anxiety, depression, brain aneurysm, CLABSI, DM, ESRD on hemo, HTN, GI hemorrhage, HLD, HTN, N/V, obesity  admitted via ED for hematemesis. Pt reports to being on Eliquis for the last 6 weeks for a PE. States she had dry heaves yesterday with brown emesis x1. Denies any bright red appearance. With upper abdominal pain while at home, described as a \"punch\", rated at a 7/10. Denies any radiation of the pain worsening or relieving factors. States the pain is gone at this time. Denies any nausea or fevers. States she is always chilled feeling. Appetite has been poor lately, denies any recent weight loss. Follows a Gluten free diet at home. Denies any prior episodes of such and up until this point was in her normal state of health. Bowels move every other day of soft brown stools. Denies any blood/black appearance. Colonoscopy in 2017 with Dr. Fannie Chan, normal at that time. Denies any FMHx of colon cancer. EGD 12/1/21: Grade B [place] classification GERD. Gastritis, biopsied to rule out H. pylori infection. Duodenum showed scalloping suspicious for celiac disease; biopsies were done. Admission labs K 5.3, chloride 93, BUN 89, creat 9.6, anion gap 18, , calcium 8.4, proBNP 75185, WBC 12.6, RBC 2.98, H&H 9.3 & 28.6. Consultation for Hematemesis, on apixaban for pulmonary embolus. Pt is  known to Dr. Fannie Chan, last seen with prior scopes. Currently, pt reports to feeling well. Requesting o go home.   Labs today chloride 94, CO2 32, BUN 34, creat 4.7, calcium 1.08, , triglycerides 173, H&H 9.6 & 29.1    Past Medical History:        Diagnosis Date    Acute pulmonary embolism ([address] 12/03/2022    Anemia in CKD (chronic kidney disease) 11/30/2021    Anxiety and depression 01/19/2022    At high risk for falls 01/19/2022    Brain aneurysm     CLABSI (central line-associated bloodstream infection) 11/19/2021    Cough 01/19/2022    Diabetes mellitus ([address] 2006    Diabetes mellitus type 2 with complications ([address] 98/10/1977    Dizziness on standing 01/19/2022    ESRD (end stage renal disease) ([address] 11/19/2021    ESRD on hemodialysis ([address] 11/19/2021    Essential hypertension 11/30/2021    Fever, unspecified     Gastrointestinal hemorrhage 11/30/2021    H/O heart artery stent 2015    Done in [place]    History of Clostridioides difficile colitis 01/19/2022    Hyperlipidemia     Hypertension     MSSA bacteremia 11/18/2021    Nausea & vomiting 11/18/2021    Obesity, Class III, BMI 40-49.9 (morbid obesity) ([address] 06/47/7570    Periumbilical abdominal pain      Past Surgical History:        Procedure Laterality Date    CARDIAC CATHETERIZATION  2015    Done in [address] [facility] [address]      COLONOSCOPY  2017    Negative findings    DIALYSIS FISTULA CREATION Left 01/28/2022    REVISION AV FISTULA LEFT ARM performed by Shobha Tucker MD at [address] [place] [address]    PTCA  2015    PTCA [address] [place] [address] N/A 12/01/2021    EGD BIOPSY performed by Alvin Elizalde MD at [address] [place] [address] N/A 11/24/2021    CATHETER INSERTION  TUNNELED HEMODIALYSIS, WITH REMOVAL OF TEMPORARY CATHETER performed by Shobha Tucker MD at [facility] OR     Current Medications:    Current Facility-Administered Medications: acetaminophen (TYLENOL) tablet 650 mg, 650 mg, Oral, Q6H PRN  insulin glargine (LANTUS) injection vial 24 Units, 24 Units, SubCUTAneous, Nightly  midodrine (PROAMATINE) tablet 10 mg, 10 mg, Oral, Daily PRN  glucose chewable tablet 16 g, 4 tablet, Oral, PRN  dextrose bolus 10% 125 mL, 125 mL, IntraVENous, PRN **OR** dextrose bolus 10% 250 mL, 250 mL, IntraVENous, PRN  glucagon (rDNA) injection 1 mg, 1 mg, IntraMUSCular, PRN  dextrose 10 % infusion, , IntraVENous, Continuous PRN  insulin lispro (HUMALOG) injection vial 0-4 Units, 0-4 Units, SubCUTAneous, TID WC  insulin lispro (HUMALOG) injection vial 0-4 Units, 0-4 Units, SubCUTAneous, Nightly  diphenhydrAMINE (BENADRYL) tablet 25 mg, 25 mg, Oral, PRN  epoetin sadia-epbx (RETACRIT) injection 3,520 Units, 30 Units/kg, SubCUTAneous, Once per day on   pantoprazole (PROTONIX) 40 mg in sodium chloride (PF) 0.9 % 10 mL injection, 40 mg, IntraVENous, Daily    Allergies:  Pcn [penicillins], Benzonatate, Morphine, and Sodium hypochlorite    Social History:    Tobacco:  Pt quit in ; 1/2 PPD for 10 years  Alcohol:  Denies  Illicit Drugs: Pt denies    Family History:   Family History   Problem Relation Age of Onset    Coronary Art Dis Mother          age 62 acute MI    Coronary Art Dis Father          age 62 CVA    Coronary Art Dis Brother          REVIEW OF SYSTEMS:    Aside from what was mentioned in the PMH and HPI, essentially unremarkable, all others negative. PHYSICAL EXAM:      Vitals:    BP (!) 123/52   Pulse 67   Temp 97.9 °F (36.6 °C) (Oral)   Resp 18   Ht 5' 4\" (1.626 m)   Wt 255 lb 3.2 oz (115.8 kg)   SpO2 100%   BMI 43.80 kg/m²       CONSTITUTIONAL:  awake, alert, cooperative, no apparent distress, and appears stated age  EYES:  pupils equal, round and reactive to light, sclera anicteric  and conjunctiva normal  ENT:  normocephalic, oral pharynx with moist mucous membranes  NECK:  supple   LUNGS:  clear to auscultation bilaterally.   CARDIOVASCULAR:  regular rate and rhythm, no murmur noted; 2+ pulses; trace edema  ABDOMEN:  No scars, normal bowel sounds, soft, non-distended, non-tender, no masses palpated,large, rounded  NEUROLOGIC: Mental Status Exam:  Level of Alertness:   awake  Orientation:   person, place, time  SKIN: pale  skin color, texture, turgor    DATA:    CBC with Differential:    Lab Results   Component Value Date/Time    WBC 12.6 01/17/2023 08:35 AM    RBC 2.98 01/17/2023 08:35 AM    HGB 9.6 01/18/2023 01:40 AM    HCT 29.1 01/18/2023 01:40 AM     01/17/2023 08:35 AM    MCV 96.0 01/17/2023 08:35 AM    MCH 31.2 01/17/2023 08:35 AM    MCHC 32.5 01/17/2023 08:35 AM    RDW 14.2 01/17/2023 08:35 AM    NRBC 0.0 01/13/2022 04:38 AM    LYMPHOPCT 6.8 01/17/2023 08:35 AM    MONOPCT 4.2 01/17/2023 08:35 AM    BASOPCT 0.3 01/17/2023 08:35 AM    ATYLYMREL 0.9 01/13/2022 04:38 AM    MONOSABS 0.53 01/17/2023 08:35 AM    LYMPHSABS 0.86 01/17/2023 08:35 AM    EOSABS 0.73 01/17/2023 08:35 AM    BASOSABS 0.04 01/17/2023 08:35 AM     CMP:    Lab Results   Component Value Date/Time     01/18/2023 03:55 AM    K 3.7 01/18/2023 03:55 AM    K 4.2 08/23/2022 12:12 PM    CL 94 01/18/2023 03:55 AM    CO2 32 01/18/2023 03:55 AM    BUN 34 01/18/2023 03:55 AM    CREATININE 4.7 01/18/2023 03:55 AM    GFRAA 8 09/22/2022 12:14 PM    LABGLOM 10 01/18/2023 03:55 AM    GLUCOSE 198 01/18/2023 03:55 AM    PROT 6.7 01/18/2023 03:55 AM    LABALBU 3.5 01/18/2023 03:55 AM    CALCIUM 8.6 01/18/2023 03:55 AM    BILITOT 0.5 01/18/2023 03:55 AM    ALKPHOS 83 01/18/2023 03:55 AM    AST 11 01/18/2023 03:55 AM    ALT 7 01/18/2023 03:55 AM     Hepatic Function Panel:    Lab Results   Component Value Date/Time    ALKPHOS 83 01/18/2023 03:55 AM    ALT 7 01/18/2023 03:55 AM    AST 11 01/18/2023 03:55 AM    PROT 6.7 01/18/2023 03:55 AM    BILITOT 0.5 01/18/2023 03:55 AM    BILIDIR <0.2 01/18/2023 03:55 AM    IBILI see below 01/18/2023 03:55 AM    LABALBU 3.5 01/18/2023 03:55 AM     PT/INR:    Lab Results   Component Value Date/Time    PROTIME 16.8 01/17/2023 08:35 AM    INR 1.5 01/17/2023 08:35 AM     PTT:    Lab Results   Component Value Date/Time    APTT 34.5 01/17/2023 08:35 AM   [APTT}  Last 3 Troponin:  No results found for: TROPONINI  TSH:    Lab Results   Component Value Date/Time    TSH 1.470 12/03/2022 12:09 PM       IRON:    Lab Results   Component Value Date/Time    IRON 25 12/04/2022 05:30 AM     Iron Saturation:    Lab Results   Component Value Date/Time    LABIRON 15 12/04/2022 05:30 AM     TIBC:    Lab Results   Component Value Date/Time    TIBC 170 12/04/2022 05:30 AM     FERRITIN:    Lab Results   Component Value Date/Time    FERRITIN 1,986 12/04/2022 05:30 AM         Lab Results   Component Value Date    TRIG 173 (H) 01/18/2023    TRIG 90 12/04/2022       Lab Results   Component Value Date    HDL 41 01/18/2023    HDL 52 12/04/2022       Lab Results   Component Value Date    LDLCALC 79 01/18/2023    LDLCALC 85 12/04/2022       Lab Results   Component Value Date    LABVLDL 35 01/18/2023    LABVLDL 18 12/04/2022        No results found. IMPRESSION:    Hematemesis  Anemia, normocytic  Poor appetite  ESRD on hemodialysis   On Eliquis for PE  DM    RECOMMENDATIONS:      NPO  EGD today with Dr. Marian Pappas. Procedure details for EGD discussed in detail. Complications including but not limited to, perforation, bleeding and infection were discussed in great detail. Risks, benefits, and alternatives explained. Pt has understood the information and has agreed to proceed. CBC, PT/INR now  Continue Protonix daily  Monitor any further emesis & document  Monitor stools  Medical management per Primary, including pain  Supportive care  Trend labs  Will follow    Thank you very much for your consultation. We will follow closely with you.     Discussed with Dr. Julee Hernandez developed by Dr. Marie Tidwell, NP-C 1/18/2023 8:00 AM for Dr. Marian Pappas

## 2023-01-18 NOTE — ANESTHESIA POSTPROCEDURE EVALUATION
Department of Anesthesiology  Postprocedure Note    Patient: Florrie Bernheim  MRN: [id number]  YOB: 1955  Date of evaluation: 1/18/2023      Procedure Summary     Date: 01/18/23 Room / Location: [facility] 02 / [facility]    Anesthesia Start: 1926 Anesthesia Stop: 2952    Procedure: EGD BIOPSY Diagnosis:       Anemia, unspecified type      (Anemia)    Surgeons: Frank Schofield MD Responsible Provider: Lamar Hanson MD    Anesthesia Type: MAC ASA Status: 4          Anesthesia Type: No value filed.     Gloria Phase I:      Gloria Phase II:        Anesthesia Post Evaluation    Patient location during evaluation: bedside  Patient participation: complete - patient participated  Level of consciousness: awake and alert  Pain score: 0  Airway patency: patent  Nausea & Vomiting: no nausea and no vomiting  Complications: no  Cardiovascular status: blood pressure returned to baseline  Respiratory status: acceptable  Hydration status: euvolemic

## 2023-01-18 NOTE — PROGRESS NOTES
Physical Therapy  Facility/Department: [address] INTERMEDIATE 1  Physical Therapy Initial Assessment    Name: Javi Mcpherson  : 1955  MRN: [id number]  Date of Service: 2023      Patient Diagnosis(es): The primary encounter diagnosis was Hematemesis with nausea. A diagnosis of ESRD needing dialysis [facility]) was also pertinent to this visit. Past Medical History:  has a past medical history of Acute pulmonary embolism ([facility] [address] Anemia in CKD (chronic kidney disease), Anxiety and depression, At high risk for falls, Brain aneurysm, CLABSI (central line-associated bloodstream infection), Cough, Diabetes mellitus ([facility] [address] Diabetes mellitus type 2 with complications ([facility] [address] Dizziness on standing, ESRD (end stage renal disease) ([facility] [address] ESRD on hemodialysis ([facility] [address] Essential hypertension, Fever, unspecified, Gastrointestinal hemorrhage, H/O heart artery stent, History of Clostridioides difficile colitis, Hyperlipidemia, Hypertension, MSSA bacteremia, Nausea & vomiting, Obesity, Class III, BMI 40-49.9 (morbid obesity) ([facility] [address] and Periumbilical abdominal pain. Past Surgical History:  has a past surgical history that includes Cardiac surgery;  section; vascular surgery (N/A, 2021); Upper gastrointestinal endoscopy (N/A, 2021); Dialysis fistula creation (Left, 2022); Colonoscopy (); Cardiac catheterization (); and Percutaneous Transluminal Coronary Angio (). Evaluating Therapist: Maryan Carlos PT    Room #:  0042/4999-G  Diagnosis:  Hematemesis [K92.0]  PMHx/PSHx:  DM, HTn, Recent PE  Precautions:  falls, O2      Social:  Pt lives with daughter in a 1 floor plan 1 small step to enter. Prior to admission independent with ww, recent use of home O2     Initial Evaluation  Date: 23   Was pt agreeable to Eval/treatment? yes   Does pt have pain?  No c/o pain   Bed Mobility  Rolling: NT  Supine to sit: nt  Sit to supine: NT  Scooting: NT   Transfers Sit to stand: independent  Stand to sit: independent  Stand pivot: independent   Ambulation    40 feet with ww independent   Stair Negotiation  Ascended and descended  NT   LE strength     4-/5   balance      good   AM-PAC Raw score               24/24       Pt is alert and Oriented   LE ROM: WFL  Sensation: intact  Endurance: fair  Chair alarm: no     ASSESSMENT:    Pt displays functional ability as noted in the objective portion of this evaluation. PHYSICAL THERAPY PLAN OF CARE:    PT POC is established based on physician order and patient diagnosis     Referring provider/PT Order: Jolly Alexander, DO/ PT eval and treat    No PT needs at this time, pt independenet    Time in  0950  Time out  1000        Evaluation Time includes thorough review of current medical information, gathering information on past medical history/social history and prior level of function, completion of standardized testing/informal observation of tasks, assessment of data and education on plan of care and goals.       CPT codes:  [x] Low Complexity PT evaluation 61994  [] Moderate Complexity PT evaluation 72690  [] High Complexity PT evaluation 96116  [] PT Re-evaluation 74572  [] Gait training 85978 minutes  [] Manual therapy 17783 minutes  [] Therapeutic activities 06526 minutes  [] Therapeutic exercises 49604 minutes  [] Neuromuscular reeducation 43276 minutes     [facility] PSYCHIATRY PT [id number]

## 2023-01-18 NOTE — BRIEF OP NOTE
Brief Postoperative Note    Jazz Reed  YOB: 1955  [id number]    Procedure: EGD with biopsy    Anesthesia: [facility]    Surgeon:  Mesha Perry MD    Findings:       Esophagus:  GERD      Stomach:MINIMAL  Gastritis bx done to rule out H. Pylori      Duodenum:  SEVERE DUODENITIS WITH SUPERFICIAL ULCERS     Complications: None      Estimated blood loss: none      Jayce Edwards MD

## 2023-01-18 NOTE — PROGRESS NOTES
Occupational Therapy  OCCUPATIONAL THERAPY INITIAL EVALUATION  [facility] [address] [facility]  [address], [facility], [place]    Date: 2023     Patient Name: Jeanie Covarrubias  MRN: [id number]  : 1955  Room: [address] [place] [address]    Evaluating OT: Curtistine Coma. Rola Nguyen, OTR/L - [id number]    Referring Provider: Marisela Nelson DO  Specific Provider Orders/Date: \"OT eval and treat\" - 2023    Diagnosis: Hematemesis [K92.0]      Pertinent Medical History: DM, ESRD on HD, brain aneurysm, anxiety, depression, HTN, obesity, PE    Precautions: fall risk, skin integrity    Assessment of Current Deficits:    [x] Functional mobility   [x]ADLs  [x] Strength               [x]Cognition   [x] Functional transfers   [x] IADLs         [x] Safety Awareness   [x]Endurance   [] Fine Coordination              [x] Balance      [] Vision/perception   [x]Sensation    []Gross Motor Coordination  [] ROM  [] Delirium                   [] Motor Control     OT PLAN OF CARE   No skilled OT needs within acute care hospital setting identified. Home Living: Patient lives with her daughter in a one-floor home (one step to enter); no basement. Bathroom Setup: tub shower (with grab bar, but no seat) and elevated toilet  Equipment Owned: home O2    Prior Level of Function (PLOF): Patient noted that she is typically independent with ADLs and functional transfers/mobility. Patient's daughter assists with IADLs, including transportation to/from dialysis. Pain Level: No complaints of pain. Cognition: Patient alert and oriented x3. WFL command follow demonstrated. Patient pleasant, cooperative, and motivated to return to [facility] and home environment.   Memory: WFL  Sequencing: WFL  Problem Solving: WFL  Judgement/Safety: WFL grossly    Functional Assessment:  AM-PAC Daily Activity Raw Score: 24   Initial Eval Status  Date: 2023   Feeding Independent   Grooming Mod I   UB Dressing Mod I / Independent   LB Dressing Mod I / Independent   Bathing Mod I / Independent   Toileting Mod I / Independent  Patient noted that she occasionally has difficulty standing from her elevated toilet seat at home; patient's daughter is available to assist, as needed, in this instances. Patient education provided regarding potential benefits of rails or 3:1 frame over toilet to maximize independence/safety with toilet transfers. Bed Mobility  Not assessed. Functional Transfers Sit-to-Stand: Independent   from bedside chair   Functional Mobility Mod I (with walker) within patient's room and hallway. Balance Sitting: Good (at edge of chair)  Standing: Fair+ (with walker)   Activity Tolerance Fair+   Visual/  Perceptual WFL      B UE Strength 4/5        ROM: Additional Information:    R UE  WFL    L UE WFL      Hearing: WFL  Sensation: Patient reported numbness/tingling in her L hand. Tone: WFL  Edema: No (B UEs)    Comments: RN approved patient's participation in [address] [place] [address] activities. Upon arrival, patient seated in bedside chair. At end of session, patient seated in bedside chair with call light and phone within reach and all lines and tubes intact. Patient education provided regardin) potential benefits of DME to maximize independence/safety with toilet transfers in home environment, 2) importance of frequent weight shifting to prevent further skin breakdown. Patient indicated understanding. Patient / Family Goal: Patient anticipates returning home upon discharge. Patient and/or family were instructed on functional diagnosis, prognosis/goals, and OT plan of care. Demonstrated Good understanding.     Eval Complexity: Low    Time In: 1000  Time Out: 1010  Total Treatment Time: 0 minutes      Minutes Units   OT Eval Low 86433 10 1   OT Eval Medium 79417     OT Eval High [id number]     OT Re-Eval [id number]     Therapeutic Ex 82484     Therapeutic Activities 16746     ADL/Self Care 08140     Orthotic Management [id number]     Neuro Re-Ed 23575 Non-Billable Time N/A ---     Evaluation time includes thorough review of current medical information, gathering information on past medical history/social history and prior level of function, completion of standardized testing/informal observation of tasks, assessment of data, and education on plan of care and goals. Tess Pillai, OTR/L  License Number: [id number]

## 2023-01-18 NOTE — ANESTHESIA PRE PROCEDURE
Department of Anesthesiology  Preprocedure Note       Name:  Donna Manrique   Age:  78 y.o.  :  1955                                          MRN:  [id number]         Date:  2023      Surgeon: Daylin Ordaz):  Vinayak Canada MD    Procedure: Procedure(s):  EGD DIAGNOSTIC ONLY    Medications prior to admission:   Prior to Admission medications    Medication Sig Start Date End Date Taking? Authorizing Provider   apixaban (ELIQUIS) 5 MG TABS tablet Take 1 tablet by mouth 2 times daily 22   Alexis Anne, DO   rosuvastatin (CRESTOR) 5 MG tablet Take 1 tablet by mouth daily 22   Alexis Anne, DO   insulin glargine (LANTUS SOLOSTAR) 100 UNIT/ML injection pen Inject 24 Units into the skin nightly  Patient not taking: No sig reported 22   Aida Arredondo MD   insulin lispro, 1 Unit Dial, (HUMALOG KWIKPEN) 100 UNIT/ML SOPN Inject 8 units with meals + following sliding scale.  -200 add 2U, -250 add 4U, -300 add 6U, -350 add 8U, -400 add 10U, BS over 400 add 12U  Patient not taking: No sig reported 22   Aida Arredondo MD   acetaminophen (TYLENOL) 650 MG extended release tablet Take 1,300 mg by mouth every 8 hours as needed for Pain    Historical Provider, MD   midodrine (PROAMATINE) 10 MG tablet Take 10 mg by mouth daily as needed (AT DIALYSIS FOR LOW BP) 21   Historical Provider, MD   calcium acetate (PHOSLO) 667 MG CAPS capsule Take 667 mg by mouth 3 times daily (with meals) 21   Historical Provider, MD       Current medications:    Current Facility-Administered Medications   Medication Dose Route Frequency Provider Last Rate Last Admin    acetaminophen (TYLENOL) tablet 650 mg  650 mg Oral Q6H PRN Alexis Anne DO   650 mg at 23 1209    insulin glargine (LANTUS) injection vial 24 Units  24 Units SubCUTAneous Nightly Loc Anne DO   24 Units at 23    midodrine (PROAMATINE) tablet 10 mg  10 mg Oral Daily PRN Alexis Richards Mihok, DO        glucose chewable tablet 16 g  4 tablet Oral PRN Earnesteen Proper Mihok, DO        dextrose bolus 10% 125 mL  125 mL IntraVENous PRN Earnesteen Proper Mihok, DO        Or    dextrose bolus 10% 250 mL  250 mL IntraVENous PRN Earnesteen Proper Mihok, DO        glucagon (rDNA) injection 1 mg  1 mg IntraMUSCular PRN Earnesteen Proper Mihok, DO        dextrose 10 % infusion   IntraVENous Continuous PRN Earnesteen Proper Mihok, DO        insulin lispro (HUMALOG) injection vial 0-4 Units  0-4 Units SubCUTAneous TID WC Loc Woodsk, DO        insulin lispro (HUMALOG) injection vial 0-4 Units  0-4 Units SubCUTAneous Nightly Loc Anne, DO        diphenhydrAMINE (BENADRYL) tablet 25 mg  25 mg Oral PRN Celestine Flores MD        epoetin sadia-epbx (RETACRIT) injection 3,520 Units  30 Units/kg SubCUTAneous Once per day on Mon Wed Fri Celestine Flores MD   3,520 Units at 01/18/23 0827    pantoprazole (PROTONIX) 40 mg in sodium chloride (PF) 0.9 % 10 mL injection  40 mg IntraVENous Daily Earnesteen Proper Mihok, DO   40 mg at 01/18/23 0827       Allergies: Allergies   Allergen Reactions    Pcn [Penicillins] Shortness Of Breath and Rash    Benzonatate Other (See Comments)     \"PATIENTS STATES \"IT WAS LIKE I WAS DRUNK. \"    Morphine Itching and Rash    Sodium Hypochlorite Nausea And Vomiting and Rash     AKA BLEACH.   RASH FROM SKIN EXPOSURE        Problem List:    Patient Active Problem List   Diagnosis Code    Nausea & vomiting R11.2    MSSA bacteremia R78.81, B95.61    CLABSI (central line-associated bloodstream infection) T80.211A    ESRD (end stage renal disease) ([facility] [address] N18.6    Fever, unspecified R50.9    Essential hypertension I10    Hyperlipidemia E78.5    Diabetes mellitus type 2 with complications ([facility] [address] O00.0    Neck pain M54.2    Anemia in CKD (chronic kidney disease) N18.9, D63.1    Pneumonia due to COVID-19 virus U07.1, J12.82    Pulmonary hypertension, unspecified (HCC) I27.20    Obesity, Class III, BMI 40-49.9 (morbid obesity) ([address] E66.01    History of Clostridioides difficile colitis Z86.19    Anxiety and depression F41.9, F32. A    At high risk for falls Z91.81    Dizziness on standing P84    Periumbilical abdominal pain R10.33    Encounter regarding vascular access for dialysis for end-stage renal disease ([address] N18.6, Z99.2    Acute pulmonary embolism ([address] I26.99    H/O heart artery stent Z95.5    Pulmonary embolism and infarction (HCC) I26.99    Diabetes mellitus ([address] E11.9    Hematemesis K92.0       Past Medical History:        Diagnosis Date    Acute pulmonary embolism ([address] 12/03/2022    Anemia in CKD (chronic kidney disease) 11/30/2021    Anxiety and depression 01/19/2022    At high risk for falls 01/19/2022    Brain aneurysm     CLABSI (central line-associated bloodstream infection) 11/19/2021    Cough 01/19/2022    Diabetes mellitus ([address] 2006    Diabetes mellitus type 2 with complications ([address] 51/76/6141    Dizziness on standing 01/19/2022    ESRD (end stage renal disease) ([address] 11/19/2021    ESRD on hemodialysis ([address] 11/19/2021    Essential hypertension 11/30/2021    Fever, unspecified     Gastrointestinal hemorrhage 11/30/2021    H/O heart artery stent 2015    Done in [address] History of Clostridioides difficile colitis 01/19/2022    Hyperlipidemia     Hypertension     MSSA bacteremia 11/18/2021    Nausea & vomiting 11/18/2021    Obesity, Class III, BMI 40-49.9 (morbid obesity) ([address] 25/12/6375    Periumbilical abdominal pain        Past Surgical History:        Procedure Laterality Date    CARDIAC CATHETERIZATION  2015    Done in [address]      COLONOSCOPY  2017    Negative findings    DIALYSIS FISTULA CREATION Left 01/28/2022    REVISION AV FISTULA LEFT ARM performed by Cricket Adams MD at [place] [address] [facility]  2015    [facility]    UPPER GASTROINTESTINAL ENDOSCOPY N/A 12/01/2021    EGD BIOPSY performed by Marjorie Naranjo MD at [facility] N/A 2021    CATHETER INSERTION  TUNNELED HEMODIALYSIS, WITH REMOVAL OF TEMPORARY CATHETER performed by Jackie Carl MD at  [facility] History:    Social History     Tobacco Use    Smoking status: Former     Packs/day: 1.00     Years: 12.00     Pack years: 12.00     Types: Cigarettes     Start date:      Quit date:      Years since quittin.0    Smokeless tobacco: Never   Substance Use Topics    Alcohol use: Never                                Counseling given: Not Answered      Vital Signs (Current):   Vitals:    23 1745 23 2030 23 0036 23 0710   BP: 96/80 (!) 132/54  (!) 123/52   Pulse: 99 97  67   Resp:    Temp: 98.2 °F (36.8 °C) 98.1 °F (36.7 °C)  97.9 °F (36.6 °C)   TempSrc: Oral Oral  Oral   SpO2: 97% 98%  100%   Weight:   255 lb 3.2 oz (115.8 kg)    Height:                                                  BP Readings from Last 3 Encounters:   23 (!) 123/52   22 102/62   22 (!) 156/75       NPO Status:                                                                                 BMI:   Wt Readings from Last 3 Encounters:   23 255 lb 3.2 oz (115.8 kg)   22 261 lb (118.4 kg)   22 250 lb (113.4 kg)     Body mass index is 43.8 kg/m².     CBC:   Lab Results   Component Value Date/Time    WBC 8.7 2023 01:10 PM    RBC 2.84 2023 01:10 PM    HGB 9.0 2023 01:10 PM    HCT 27.4 2023 01:10 PM    MCV 96.5 2023 01:10 PM    RDW 14.3 2023 01:10 PM     2023 01:10 PM       CMP:   Lab Results   Component Value Date/Time     2023 03:55 AM    K 3.7 2023 03:55 AM    K 4.2 2022 12:12 PM    CL 94 2023 03:55 AM    CO2 32 2023 03:55 AM    BUN 34 2023 03:55 AM    CREATININE 4.7 2023 03:55 AM    GFRAA 8 2022 12:14 PM    LABGLOM 10 2023 03:55 AM    GLUCOSE 198 01/18/2023 03:55 AM    PROT 6.7 01/18/2023 03:55 AM    CALCIUM 8.6 01/18/2023 03:55 AM    BILITOT 0.5 01/18/2023 03:55 AM    ALKPHOS 83 01/18/2023 03:55 AM    AST 11 01/18/2023 03:55 AM    ALT 7 01/18/2023 03:55 AM       POC Tests: No results for input(s): POCGLU, POCNA, POCK, POCCL, POCBUN, POCHEMO, POCHCT in the last 72 hours. Coags:   Lab Results   Component Value Date/Time    PROTIME 15.8 01/18/2023 01:10 PM    INR 1.4 01/18/2023 01:10 PM    APTT 34.5 01/17/2023 08:35 AM       HCG (If Applicable): No results found for: PREGTESTUR, PREGSERUM, HCG, HCGQUANT     ABGs: No results found for: PHART, PO2ART, GNA2KHX, WUM5MRZ, BEART, T2DTMYRW     Type & Screen (If Applicable):  No results found for: LABABO, LABRH    Drug/Infectious Status (If Applicable):  No results found for: HIV, HEPCAB    COVID-19 Screening (If Applicable):   Lab Results   Component Value Date/Time    COVID19 Not Detected 12/03/2022 11:34 AM    COVID19 Not Detected 12/03/2022 06:56 AM       Narrative   EXAMINATION:   ONE XRAY VIEW OF THE CHEST PORTABLE SUPINE       11/30/2021 10:37 am       COMPARISON:   11/24/2021       HISTORY:   ORDERING SYSTEM PROVIDED HISTORY: epigastric pain   TECHNOLOGIST PROVIDED HISTORY:   Reason for exam:->epigastric pain       FINDINGS:   The right subclavian dialysis catheter appears unchanged in position.  Again   seen is short length mild kinking of the catheter at the level of the right   1st rib and the degree of catheter narrowing appears less conspicuous than   prior.  Otherwise, no significant change.  Cardiomegaly and mild pulmonary   venous congestion.  No acute pulmonary infiltrate or pleural effusion.  No   pneumothorax.          Anesthesia Evaluation  Patient summary reviewed and Nursing notes reviewed no history of anesthetic complications:   Airway: Mallampati: II  TM distance: >3 FB   Neck ROM: full  Mouth opening: > = 3 FB   Dental: normal exam         Pulmonary:Negative Pulmonary ROS and normal exam breath sounds clear to auscultation      (-) not a current smoker (Former)                           Cardiovascular:  Exercise tolerance: good (>4 METS),   (+) hypertension: moderate, CAD: obstructive, CABG/stent ([facility]CA with [facility]):, pulmonary hypertension: mild, hyperlipidemia      ECG reviewed  Rhythm: regular  Rate: normal  Echocardiogram reviewed         Beta Blocker:  Dose within 24 Hrs      ROS comment: Sinus rhythm with marked sinus arrhythmia with occasional premature ventricular complexes  Moderate voltage criteria for LVH, may be normal variant  Nonspecific ST and T wave abnormality  Abnormal ECG  No previous ECGs available    Summary  No PENELOPE indication of Endocarditis. Normal left ventricular chamber size and systolic function. Left atrium is mildly enlarged. Interatrial septum intact, focal area of calcification near mid septum. No thrombus in left atrium or left atrial appendage. Normal right ventricle size and function. Moderately enlarged right atrium size. The aortic valve appears moderately sclerotic. No hemodynamically significant aortic stenosis, mean gradient 14mmHg, peak gradient 24mmHg, ERENDIRA 2.1cm2 by VTI, Vmax and  planimetry. There is mild tricuspid regurgitation. Mild Pulmonary hypertension, RVSP 40mmHg. Normal aortic root size. No evidence of pericardial effusion. No comparison study available. Neuro/Psych:   (+) psychiatric history:             ROS comment: Cerebral aneurysm    Ambulatory dysfunction and fatigue GI/Hepatic/Renal:   (+) GERD: no interval change, renal disease (32/5.3 w/ GFR 8): ESRD and dialysis, morbid obesity (BMI 43.4 kg/m2)         ROS comment: Upper GI bleed. Endo/Other:    (+) DiabetesType II DM, using insulin, blood dyscrasia (7.6/24.0): anemia:., .          Pt had no PAT visit        ROS comment: MSSA bacteremia    CLABSI (central line-associated bloodstream infection)     Abdominal:             Vascular: negative vascular ROS.          Other Findings: Anesthesia Plan      MAC     ASA 4       Induction: intravenous. Anesthetic plan and risks discussed with patient. Plan discussed with CRNA. MD Jeffry Andrade MD   1/18/2023        History, data, and pertinent studies from chart review. Above represents information available via the shared medical record including previous anesthetic, medication, and allergy history. Confirmation of above and final disposition per DOS anesthesiologist.    Jeffry Sanchez MD  Staff Anesthesiologist  January 18, 2023  1:43 PM       Patient seen and chart reviewed. No interval change in history or exam.   Anesthesia plan discussed, risk/benefits addressed. Patient's concerns and questions answered.      TAWNYA Bauer - CRNA  January 18, 2023  2:40 PM

## 2023-01-18 NOTE — PROGRESS NOTES
PROGRESS  NOTE --                                                          INTERNAL  MEDICINE                                                                              I  PERSONALLY SAW , EXAMINED, AND CARED Shanique 124, 1/18/2023     LABS, XRAY ,CHART, AND MEDICATIONS  REVIEWED BY ME       Chief complaint: Vomiting blood        SUBJECTIVE: Jazz Reed is alert awake and cooperative; oriented ×3. Denies any chest pain dyspnea nausea emesis. Tolerating diet. No abdominal pain. Sitting in bedside chair; daughter present through the exam.  No complaints voiced at this time feels good. EGD scheduled for this afternoon. Afebrile last 24 hours. Blood pressure 123/52. SPO2 100 on 4 L nasal cannula. Intake and output -2200 cc. CO2 32 BUN 34 creatinine improved 4.7. Ionized calcium low at 1.08. Fasting glucose 198. LDL 79 triglycerides 173. Liver functions normal.  Most recent hemoglobin 9.6, minimal change since admission. Nephrology consult from yesterday appreciated. Retacrit while here; continue hemodialysis Tuesdays Thursdays Saturdays. Pulmonary consult yesterday appreciated. Hold apixaban for now no indication for reversal agent. Await GI input possible endoscopy to determine whether apixaban can be resumed. Patient underwent hemodialysis yesterday with net removal of 2200 cc. Pulmonary note from today, continue off apixaban until GI input. Nephrology note from today appreciated. Patient signed out of hemodialysis yesterday, too much discomfort laying on the gurney. (Patient states dialysis was shortened by 20 minutes). Patient does not think there are plans for EGD and hopes to be discharged. There are plans for EGD today.       Objective:     PHYSICAL EXAM:    VS: BP (!) 123/52   Pulse 67   Temp 97.9 °F (36.6 °C) (Oral)   Resp 18   Ht 5' 4\" (1.626 m)   Wt 255 lb 3.2 oz (115.8 kg)   SpO2 100% BMI 43.80 kg/m²     Labs:   CBC:   Lab Results   Component Value Date/Time    WBC 12.6 01/17/2023 08:35 AM    RBC 2.98 01/17/2023 08:35 AM    HGB 9.6 01/18/2023 01:40 AM    HCT 29.1 01/18/2023 01:40 AM    MCV 96.0 01/17/2023 08:35 AM    MCH 31.2 01/17/2023 08:35 AM    MCHC 32.5 01/17/2023 08:35 AM    RDW 14.2 01/17/2023 08:35 AM     01/17/2023 08:35 AM    MPV 10.1 01/17/2023 08:35 AM     CBC with Differential:    Lab Results   Component Value Date/Time    WBC 12.6 01/17/2023 08:35 AM    RBC 2.98 01/17/2023 08:35 AM    HGB 9.6 01/18/2023 01:40 AM    HCT 29.1 01/18/2023 01:40 AM     01/17/2023 08:35 AM    MCV 96.0 01/17/2023 08:35 AM    MCH 31.2 01/17/2023 08:35 AM    MCHC 32.5 01/17/2023 08:35 AM    RDW 14.2 01/17/2023 08:35 AM    NRBC 0.0 01/13/2022 04:38 AM    LYMPHOPCT 6.8 01/17/2023 08:35 AM    MONOPCT 4.2 01/17/2023 08:35 AM    BASOPCT 0.3 01/17/2023 08:35 AM    MONOSABS 0.53 01/17/2023 08:35 AM    LYMPHSABS 0.86 01/17/2023 08:35 AM    EOSABS 0.73 01/17/2023 08:35 AM    BASOSABS 0.04 01/17/2023 08:35 AM     Hemoglobin/Hematocrit:    Lab Results   Component Value Date/Time    HGB 9.6 01/18/2023 01:40 AM    HCT 29.1 01/18/2023 01:40 AM     CMP:    Lab Results   Component Value Date/Time     01/18/2023 03:55 AM    K 3.7 01/18/2023 03:55 AM    K 4.2 08/23/2022 12:12 PM    CL 94 01/18/2023 03:55 AM    CO2 32 01/18/2023 03:55 AM    BUN 34 01/18/2023 03:55 AM    CREATININE 4.7 01/18/2023 03:55 AM    GFRAA 8 09/22/2022 12:14 PM    LABGLOM 10 01/18/2023 03:55 AM    GLUCOSE 198 01/18/2023 03:55 AM    PROT 6.7 01/18/2023 03:55 AM    LABALBU 3.5 01/18/2023 03:55 AM    CALCIUM 8.6 01/18/2023 03:55 AM    BILITOT 0.5 01/18/2023 03:55 AM    ALKPHOS 83 01/18/2023 03:55 AM    AST 11 01/18/2023 03:55 AM    ALT 7 01/18/2023 03:55 AM     BMP:    Lab Results   Component Value Date/Time     01/18/2023 03:55 AM    K 3.7 01/18/2023 03:55 AM    K 4.2 08/23/2022 12:12 PM    CL 94 01/18/2023 03:55 AM    CO2 32 01/18/2023 03:55 AM    BUN 34 01/18/2023 03:55 AM    LABALBU 3.5 01/18/2023 03:55 AM    CREATININE 4.7 01/18/2023 03:55 AM    CALCIUM 8.6 01/18/2023 03:55 AM    GFRAA 8 09/22/2022 12:14 PM    LABGLOM 10 01/18/2023 03:55 AM    GLUCOSE 198 01/18/2023 03:55 AM     Hepatic Function Panel:    Lab Results   Component Value Date/Time    ALKPHOS 83 01/18/2023 03:55 AM    ALT 7 01/18/2023 03:55 AM    AST 11 01/18/2023 03:55 AM    PROT 6.7 01/18/2023 03:55 AM    BILITOT 0.5 01/18/2023 03:55 AM    BILIDIR <0.2 01/18/2023 03:55 AM    IBILI see below 01/18/2023 03:55 AM    LABALBU 3.5 01/18/2023 03:55 AM     Ionized Calcium:  No results found for: IONCA  Magnesium:    Lab Results   Component Value Date/Time    MG 2.0 01/18/2023 03:55 AM     Phosphorus:    Lab Results   Component Value Date/Time    PHOS 4.4 01/18/2023 03:55 AM     LDH:    Lab Results   Component Value Date/Time     01/11/2022 12:45 PM     Uric Acid:    Lab Results   Component Value Date/Time    LABURIC 3.3 12/04/2022 05:30 AM     PT/INR:    Lab Results   Component Value Date/Time    PROTIME 16.8 01/17/2023 08:35 AM    INR 1.5 01/17/2023 08:35 AM     Warfarin PT/INR:  No components found for: PTPATWAR, PTINRWAR  PTT:    Lab Results   Component Value Date/Time    APTT 34.5 01/17/2023 08:35 AM   [APTT}  Troponin:  No results found for: TROPONINI  Last 3 Troponin:  No results found for: TROPONINI  U/A:    Lab Results   Component Value Date/Time    COLORU Yellow 11/18/2021 03:19 AM    PROTEINU >=300 11/18/2021 03:19 AM    PHUR 6.0 11/18/2021 03:19 AM    WBCUA 1-3 11/18/2021 03:19 AM    RBCUA 0-1 11/18/2021 03:19 AM    BACTERIA FEW 11/18/2021 03:19 AM    CLARITYU Clear 11/18/2021 03:19 AM    SPECGRAV 1.020 11/18/2021 03:19 AM    LEUKOCYTESUR Negative 11/18/2021 03:19 AM    UROBILINOGEN 0.2 11/18/2021 03:19 AM    BILIRUBINUR Negative 11/18/2021 03:19 AM    BLOODU SMALL 11/18/2021 03:19 AM    GLUCOSEU 500 11/18/2021 03:19 AM     ABG:  No results found for: PH, PCO2, PO2, HCO3, BE, THGB, TCO2, O2SAT  HgBA1c:    Lab Results   Component Value Date/Time    LABA1C 11.9 01/17/2023 06:35 PM     FLP:    Lab Results   Component Value Date/Time    TRIG 173 01/18/2023 03:55 AM    HDL 41 01/18/2023 03:55 AM    LDLCALC 79 01/18/2023 03:55 AM    LABVLDL 35 01/18/2023 03:55 AM     TSH:    Lab Results   Component Value Date/Time    TSH 1.470 12/03/2022 12:09 PM     VITAMIN B12: No components found for: B12  FOLATE:  No results found for: FOLATE  IRON:    Lab Results   Component Value Date/Time    IRON 25 12/04/2022 05:30 AM     Iron Saturation:  No components found for: PERCENTFE  TIBC:    Lab Results   Component Value Date/Time    TIBC 170 12/04/2022 05:30 AM     FERRITIN:    Lab Results   Component Value Date/Time    FERRITIN 1,986 12/04/2022 05:30 AM        General appearance: Alert, Awake, Oriented times 3, no distress ; morbidly obese  Skin: Warm and dry ; no rashes  Head: Normocephalic. No masses, lesions or tenderness noted  Eyes: Conjunctivae pale, sclera white. PERRL,EOM-I  Ears: External ears normal  Nose/Sinuses: Nares normal. Septum midline. Mucosa normal. No drainage  Oropharynx: Oropharynx clear with no exudate seen  Neck: Supple. No jugular venous distension, lymphadenopathy or thyromegaly Trachea midline  Lungs: Clear to auscultation bilaterally. No rhonchi, crackles or wheezes  Heart: S1 S2  Regular rate and rhythm. No rub, gallop, murmur  Abdomen: Soft, mildly tender right upper quadrant and epigastrium. BS normal. No masses, organomegaly; no rebound or guarding  Extremities: Chronic 2-3+ bilateral edema since starting dialysis. Musculoskeletal: Muscular strength appears intact. Neuro:  No focal motor defects ; II-XII grossly intact . MORRIS equally    TELEMETRY: REVIEWED--Telemetry: Sinus    ASSESSMENT:   Principal Problem:    Hematemesis  Resolved Problems:    * No resolved hospital problems.  *      PLAN:  SEE ORDERS      RE  CHANGES AND FINDINGS   Medications reviewed with patient  GI prophylaxis  DVT prophylaxis  Consultants notes reviewed  Apixaban and anticoagulants on hold  Lantus 24 units nightly  Low-dose sliding scale insulin  Protonix 40 mg IV  Continue hemodialysis  Midodrine 10 mg as needed for low blood pressure dialysis  EGD today      Discussed with patient and adult child and nursing. Mu Juarez PeggyzaDO     10:54 AM     1/18/2023    TIME > 35 MINUTES      Please note that over 35 minutes was spent . Greater than 51% includes interviewing patient and reviewing chart; performing medical examination; ordering medications, tests and/or procedures; formulating assessment plan, reviewing rationale for the above recommendations; reviewing available records, results of all previously ordered tests and procedures and current problem list; counseling/educating the patient; coordinating care with other healthcare professionals; communicating results to the patient's family/caregiver; documenting clinical information in the electronic record                    ------------  INFORMATION  -----------      DIET:Diet NPO        Allergies   Allergen Reactions    Pcn [Penicillins] Shortness Of Breath and Rash    Benzonatate Other (See Comments)     \"PATIENTS STATES \"IT WAS LIKE I WAS DRUNK. \"    Morphine Itching and Rash    Sodium Hypochlorite Nausea And Vomiting and Rash     AKA BLEACH.   RASH FROM SKIN EXPOSURE          MEDICATION SIDE EFFECTS:none       SCHEDULED MEDS:                                 Scheduled Meds:   insulin glargine  24 Units SubCUTAneous Nightly    insulin lispro  0-4 Units SubCUTAneous TID     insulin lispro  0-4 Units SubCUTAneous Nightly    epoetin sadia-epbx  30 Units/kg SubCUTAneous Once per day on Mon Wed Fri    pantoprazole (PROTONIX) 40 mg injection  40 mg IntraVENous Daily       Continuous Infusions:   dextrose           Data:       Intake/Output Summary (Last 24 hours) at 1/18/2023 1054  Last data filed at 1/17/2023 1645  Gross per 24 hour   Intake 300 ml   Output 2500 ml   Net -2200 ml       Wt Readings from Last 3 Encounters:   01/18/23 255 lb 3.2 oz (115.8 kg)   12/22/22 261 lb (118.4 kg)   12/13/22 250 lb (113.4 kg)       Labs: Additional    GLUCOSE:No results for input(s): POCGLU in the last 72 hours. BNP:No results found for: BNP    CRP:   Recent Labs     01/17/23  1835   CRP 5.6*       ESR:No results for input(s): SEDRATE in the last 72 hours.     RADIOLOGY: REVIEWED AVAILABLE REPORT  No orders to display             Ivy Scheuermann, DO    10:54 AM     1/18/2023      Voice recognition software used for dictation

## 2023-01-19 ENCOUNTER — APPOINTMENT (OUTPATIENT)
Dept: ULTRASOUND IMAGING | Age: 68
DRG: 377 | End: 2023-01-19
Payer: MEDICARE

## 2023-01-19 PROBLEM — B95.61 MSSA BACTEREMIA: Status: RESOLVED | Noted: 2021-11-18 | Resolved: 2023-01-19

## 2023-01-19 PROBLEM — I89.0 LYMPHEDEMA OF BOTH LOWER EXTREMITIES: Status: ACTIVE | Noted: 2023-01-19

## 2023-01-19 PROBLEM — T80.211A CLABSI (CENTRAL LINE-ASSOCIATED BLOODSTREAM INFECTION): Status: RESOLVED | Noted: 2021-11-19 | Resolved: 2023-01-19

## 2023-01-19 PROBLEM — N18.6 END-STAGE RENAL DISEASE ON HEMODIALYSIS (HCC): Status: ACTIVE | Noted: 2023-01-19

## 2023-01-19 PROBLEM — R78.81 MSSA BACTEREMIA: Status: RESOLVED | Noted: 2021-11-18 | Resolved: 2023-01-19

## 2023-01-19 PROBLEM — M79.89 LEG SWELLING: Status: ACTIVE | Noted: 2023-01-19

## 2023-01-19 PROBLEM — N18.6 ENCOUNTER REGARDING VASCULAR ACCESS FOR DIALYSIS FOR END-STAGE RENAL DISEASE (HCC): Status: RESOLVED | Noted: 2022-10-03 | Resolved: 2023-01-19

## 2023-01-19 PROBLEM — Z99.2 ENCOUNTER REGARDING VASCULAR ACCESS FOR DIALYSIS FOR END-STAGE RENAL DISEASE (HCC): Status: RESOLVED | Noted: 2022-10-03 | Resolved: 2023-01-19

## 2023-01-19 PROBLEM — Z99.2 END-STAGE RENAL DISEASE ON HEMODIALYSIS (HCC): Status: ACTIVE | Noted: 2023-01-19

## 2023-01-19 LAB
ALBUMIN SERPL-MCNC: 3.5 G/DL (ref 3.5–5.2)
ALP BLD-CCNC: 89 U/L (ref 35–104)
ALT SERPL-CCNC: 8 U/L (ref 0–32)
ANION GAP SERPL CALCULATED.3IONS-SCNC: 14 MMOL/L (ref 7–16)
AST SERPL-CCNC: 12 U/L (ref 0–31)
BASOPHILS ABSOLUTE: 0.05 E9/L (ref 0–0.2)
BASOPHILS RELATIVE PERCENT: 0.5 % (ref 0–2)
BILIRUB SERPL-MCNC: 0.3 MG/DL (ref 0–1.2)
BILIRUBIN DIRECT: <0.2 MG/DL (ref 0–0.3)
BILIRUBIN, INDIRECT: NORMAL MG/DL (ref 0–1)
BUN BLDV-MCNC: 46 MG/DL (ref 6–23)
CALCIUM SERPL-MCNC: 8.3 MG/DL (ref 8.6–10.2)
CHLORIDE BLD-SCNC: 95 MMOL/L (ref 98–107)
CO2: 28 MMOL/L (ref 22–29)
CREAT SERPL-MCNC: 6.3 MG/DL (ref 0.5–1)
EOSINOPHILS ABSOLUTE: 0.85 E9/L (ref 0.05–0.5)
EOSINOPHILS RELATIVE PERCENT: 9.1 % (ref 0–6)
GFR SERPL CREATININE-BSD FRML MDRD: 7 ML/MIN/1.73
GLUCOSE BLD-MCNC: 279 MG/DL (ref 74–99)
HCT VFR BLD CALC: 30.4 % (ref 34–48)
HEMOGLOBIN: 9.8 G/DL (ref 11.5–15.5)
IMMATURE GRANULOCYTES #: 0.05 E9/L
IMMATURE GRANULOCYTES %: 0.5 % (ref 0–5)
LYMPHOCYTES ABSOLUTE: 0.84 E9/L (ref 1.5–4)
LYMPHOCYTES RELATIVE PERCENT: 9 % (ref 20–42)
MAGNESIUM: 2.2 MG/DL (ref 1.6–2.6)
MCH RBC QN AUTO: 31 PG (ref 26–35)
MCHC RBC AUTO-ENTMCNC: 32.2 % (ref 32–34.5)
MCV RBC AUTO: 96.2 FL (ref 80–99.9)
METER GLUCOSE: 143 MG/DL (ref 74–99)
METER GLUCOSE: 181 MG/DL (ref 74–99)
METER GLUCOSE: 252 MG/DL (ref 74–99)
METER GLUCOSE: 253 MG/DL (ref 74–99)
MONOCYTES ABSOLUTE: 0.58 E9/L (ref 0.1–0.95)
MONOCYTES RELATIVE PERCENT: 6.2 % (ref 2–12)
NEUTROPHILS ABSOLUTE: 6.94 E9/L (ref 1.8–7.3)
NEUTROPHILS RELATIVE PERCENT: 74.7 % (ref 43–80)
PDW BLD-RTO: 14.3 FL (ref 11.5–15)
PHOSPHORUS: 5.3 MG/DL (ref 2.5–4.5)
PLATELET # BLD: 139 E9/L (ref 130–450)
PMV BLD AUTO: 10.4 FL (ref 7–12)
POTASSIUM SERPL-SCNC: 4 MMOL/L (ref 3.5–5)
RBC # BLD: 3.16 E12/L (ref 3.5–5.5)
SODIUM BLD-SCNC: 137 MMOL/L (ref 132–146)
TOTAL PROTEIN: 6.6 G/DL (ref 6.4–8.3)
WBC # BLD: 9.3 E9/L (ref 4.5–11.5)

## 2023-01-19 PROCEDURE — 85025 COMPLETE CBC W/AUTO DIFF WBC: CPT

## 2023-01-19 PROCEDURE — 2060000000 HC ICU INTERMEDIATE R&B

## 2023-01-19 PROCEDURE — 80076 HEPATIC FUNCTION PANEL: CPT

## 2023-01-19 PROCEDURE — 6370000000 HC RX 637 (ALT 250 FOR IP): Performed by: INTERNAL MEDICINE

## 2023-01-19 PROCEDURE — 2580000003 HC RX 258: Performed by: NURSE PRACTITIONER

## 2023-01-19 PROCEDURE — 93970 EXTREMITY STUDY: CPT

## 2023-01-19 PROCEDURE — 2700000000 HC OXYGEN THERAPY PER DAY

## 2023-01-19 PROCEDURE — 36415 COLL VENOUS BLD VENIPUNCTURE: CPT

## 2023-01-19 PROCEDURE — 83735 ASSAY OF MAGNESIUM: CPT

## 2023-01-19 PROCEDURE — 84100 ASSAY OF PHOSPHORUS: CPT

## 2023-01-19 PROCEDURE — 6360000002 HC RX W HCPCS: Performed by: NURSE PRACTITIONER

## 2023-01-19 PROCEDURE — 90935 HEMODIALYSIS ONE EVALUATION: CPT

## 2023-01-19 PROCEDURE — A4216 STERILE WATER/SALINE, 10 ML: HCPCS | Performed by: NURSE PRACTITIONER

## 2023-01-19 PROCEDURE — 99223 1ST HOSP IP/OBS HIGH 75: CPT | Performed by: SURGERY

## 2023-01-19 PROCEDURE — G0378 HOSPITAL OBSERVATION PER HR: HCPCS

## 2023-01-19 PROCEDURE — C9113 INJ PANTOPRAZOLE SODIUM, VIA: HCPCS | Performed by: NURSE PRACTITIONER

## 2023-01-19 PROCEDURE — 82962 GLUCOSE BLOOD TEST: CPT

## 2023-01-19 PROCEDURE — 80048 BASIC METABOLIC PNL TOTAL CA: CPT

## 2023-01-19 RX ADMIN — INSULIN GLARGINE 24 UNITS: 100 INJECTION, SOLUTION SUBCUTANEOUS at 22:09

## 2023-01-19 RX ADMIN — ACETAMINOPHEN 650 MG: 325 TABLET ORAL at 04:02

## 2023-01-19 RX ADMIN — SODIUM CHLORIDE 40 MG: 9 INJECTION, SOLUTION INTRAMUSCULAR; INTRAVENOUS; SUBCUTANEOUS at 22:09

## 2023-01-19 RX ADMIN — INSULIN LISPRO 2 UNITS: 100 INJECTION, SOLUTION INTRAVENOUS; SUBCUTANEOUS at 16:08

## 2023-01-19 ASSESSMENT — PAIN SCALES - GENERAL
PAINLEVEL_OUTOF10: 0
PAINLEVEL_OUTOF10: 3

## 2023-01-19 NOTE — OP NOTE
[id number] [address]                                OPERATIVE REPORT    PATIENT NAME: Stephen Alarcon                     :        1955  MED REC NO:   [id number]                            ROOM:       0430  ACCOUNT NO:   [de-identified]                           ADMIT DATE: 2023  PROVIDER:     Orvilla Bence, MD    DATE OF PROCEDURE:  2023    PROCEDURE PERFORMED:  Upper endoscopy with biopsy. PREOPERATIVE DIAGNOSIS:  Evaluation for anemia and hematemesis. POSTOPERATIVE DIAGNOSES:  Grade B LA classification GERD and gastritis,  minimal, biopsied to rule out H. pylori infection. Duodenum showed  severe duodenitis with superficial ulceration. Biopsies were done. SURGEON:  Orvilla Bence, M.D. ANESTHESIA:  Local monitored anesthesia care. NOTE:  Prior to the procedure an informed consent was obtained from the  patient after explaining the benefits as well as the risks,  alternatives, and complications of the procedure to the patient, who  understood and agreed. DESCRIPTION OF PROCEDURE:  With the patient in the left lateral  decubitus position, the Olympus GIF-100 forward-viewing videoscope was  introduced into the esophagus, the evaluation of which showed grade B LA  classification GERD and no hiatal hernia was seen. The scope was then advanced through the gastroesophageal junction into  the gastric body, along the greater curvature. Evaluation of the body  of the stomach showed minimal gastritis. Biopsies were taken from this  area to rule out Helicobacter pylori infection. The scope was then advanced through the pylorus into the duodenal bulb  and second portion of the duodenum and evaluation showed severe  duodenitis with edema and superficial ulceration. Biopsies were done to  rule out sprue.   The scope was then retrieved and retroflexed in the  prepyloric antrum, with thorough evaluation of the cardiac and fundal  portions of the stomach, which appeared to be within normal limits. The scope was then straightened, the area deflated, and the procedure  was terminated by withdrawing the scope and conducting a second look on  the way out, which was essentially the same. The patient tolerated the procedure well.         Ez Johnson MD    D: 01/18/2023 15:11:18       T: 01/18/2023 15:13:41     [id number]  Job#: [id number]     Doc#: [id number]    CC:  MD Maureen Lee MD

## 2023-01-19 NOTE — PROGRESS NOTES
[facility] MD Kranthi Corbin, MD Josue Pollard, CASH Echavarria, ANP  Subha Francis, CNP  Progress Note    1/19/2023    SUBJECTIVE:   1/18: Sitting up in the chair. No acute complaints. She signed off hemodialysis yesterday due to discomfort from being on the hospital [facility]. She does not think that there will be plans for a EGD and hopes to be discharged. Eliquis is on hold until a decision is made. No acute complaints. ROS is unremarkable. 1/19:  Seen while on dialysis. Tolerated well. On 2L/NC. Denies chest pain or SOB. Denies nausea, vomiting or abd pain. Wants to be discharged. PROBLEM LIST:    Principal Problem:    Hematemesis  Active Problems:    H/O heart artery stent    Diabetes mellitus ([facility] [address]    ESRD (end stage renal disease) ([facility] [address]    Essential hypertension    Diabetes mellitus type 2 with complications ([facility] [address]    Pulmonary hypertension, unspecified (HCC)    Obesity, Class III, BMI 40-49.9 (morbid obesity) ([facility] [address]  Resolved Problems:    * No resolved hospital problems. *         DIET:    ADULT DIET;  Regular; 4 carb choices (60 gm/meal); GI Terrell (GERD/Peptic Ulcer)     MEDS (scheduled):    pantoprazole (PROTONIX) 40 mg injection  40 mg IntraVENous Q12H    insulin glargine  24 Units SubCUTAneous Nightly    insulin lispro  0-4 Units SubCUTAneous TID WC    insulin lispro  0-4 Units SubCUTAneous Nightly    epoetin sadia-epbx  30 Units/kg SubCUTAneous Once per day on Mon Wed Fri       MEDS (infusions):   dextrose         MEDS (prn):  acetaminophen, midodrine, glucose, dextrose bolus **OR** dextrose bolus, glucagon (rDNA), dextrose, diphenhydrAMINE    PHYSICAL EXAM:     Patient Vitals for the past 24 hrs:   BP Temp Temp src Pulse Resp SpO2 Weight   01/19/23 1100 139/68 -- -- 73 -- -- --   01/19/23 1037 117/73 -- -- 69 -- -- --   01/19/23 1002 (!) 122/55 -- -- 69 -- -- --   01/19/23 0930 (!) 121/57 -- -- 69 -- -- --   01/19/23 0900 (!) 125/52 -- -- 70 -- -- --   01/19/23 0831 (!) 118/54 -- -- 69 -- -- --   01/19/23 0801 (!) 116/49 -- -- 70 -- -- --   01/19/23 0721 (!) 135/50 -- -- 70 -- -- --   01/19/23 0715 (!) 148/71 97.5 °F (36.4 °C) -- 72 18 -- 266 lb 8.6 oz (120.9 kg)   01/18/23 2330 (!) 112/53 98.2 °F (36.8 °C) Oral 76 16 -- --   01/18/23 2000 (!) 119/53 98 °F (36.7 °C) Oral 74 16 96 % --   01/18/23 1541 (!) 133/49 97 °F (36.1 °C) Temporal 93 16 100 % --   01/18/23 1530 (!) 113/55 -- -- 97 16 96 % --   01/18/23 1522 (!) 85/43 -- -- -- -- 100 % --   01/18/23 1515 (!) 102/50 -- -- -- -- 99 % --   01/18/23 1513 (!) 99/56 -- -- -- -- 98 % --   01/18/23 1502 (!) 75/46 -- -- 91 15 99 % --   01/18/23 1452 125/78 96.8 °F (36 °C) Tympanic 78 14 95 % --     @      Intake/Output Summary (Last 24 hours) at 1/19/2023 1212  Last data filed at 1/18/2023 1541  Gross per 24 hour   Intake 400 ml   Output --   Net 400 ml           Wt Readings from Last 3 Encounters:   01/19/23 266 lb 8.6 oz (120.9 kg)   12/22/22 261 lb (118.4 kg)   12/13/22 250 lb (113.4 kg)       Constitutional:  in no acute distress  HEENT: NC/AT, EOMI, sclera and conjunctiva are clear and anicteric, mucus membranes moist  Neck: Trachea midline, no JVD  Cardiovascular: S1, S2 regular rhythm, no murmur,or rub  Respiratory: CTAB, diminished in the bases. No crackles, no wheeze  Gastrointestinal:  Soft, nontender, nondistended, NABS  Ext: +2 BLE edema, feet warm  Skin: dry, no rash.  Red/jefferson BLE  Neuro: awake, alert, interactive      DATA:    Recent Labs     01/17/23  0835 01/17/23  1835 01/18/23  0140 01/18/23  1310   WBC 12.6*  --   --  8.7   HGB 9.3* 9.5* 9.6* 9.0*   HCT 28.6* 28.9* 29.1* 27.4*   MCV 96.0  --   --  96.5     --   --  154       Recent Labs     01/17/23  0835 01/17/23  1835 01/18/23  0355 01/19/23  0300     --  137 137   K 5.3*  --  3.7 4.0   CL 93*  --  94* 95*   CO2 23  --  32* 28   MG  --   --  2.0 2.2   PHOS  --  3.2 4.4 5.3*   BUN 89*  --  34* 46* CREATININE 8.6*  --  4.7* 6.3*   ALT 9  --  7 8   AST 9  --  11 12   BILIDIR  --   --  <0.2 <0.2   BILITOT 0.4  --  0.5 0.3   ALKPHOS 101  --  83 89         No results found for: LABPROT    Assessment  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, renal microvascular atherosclerotic disease, and history of acute kidney injury due to contrast-induced nephropathy  Anemia due to ESRD  Secondary hyperparathyroidism/mineral bone disease due to ESRD  History of hypertension, typically well controlled    Removed 3L on dialysis treatment today  GI note reviewed      Recommendations  Continue IHD support for solute and volume clearance on Tuesdays Thursdays and Saturdays as per outpatient orders and dry weight  Continue JHONATAN: Retacrit while here  Continue other aspects of renal management  Follow labs, BMP  OK for discharge strictly from  renal POV      Electronically signed by TAWNYA Ziegler CNP on 1/19/2023 at 12:12 PM    Nephrology Staff   Patient seen and examined. Findings and physical exam confirmed independently by me. HD without event today. Tolerating therapy well. Case discussed with Isaiah Jacobs CNP. As above except as annotated.     Discussed with Dr. Kenia Mansfield MD  1/19/2023

## 2023-01-19 NOTE — PROGRESS NOTES
[facility]  [address] [facility], [address], [address] [facility]      Pulmonary Progress Note      SUBJECTIVE:  lying down in bed on 2 li NC getting HD, claims doing ok with breathing. EGD done yesterday showed gastritis/duodenitis with superficial ulceration.     OBJECTIVE    Medications    Continuous Infusions:   dextrose         Scheduled Meds:   insulin glargine  24 Units SubCUTAneous Nightly    insulin lispro  0-4 Units SubCUTAneous TID WC    insulin lispro  0-4 Units SubCUTAneous Nightly    epoetin sadia-epbx  30 Units/kg SubCUTAneous Once per day on     pantoprazole (PROTONIX) 40 mg injection  40 mg IntraVENous Daily       PRN Meds:acetaminophen, midodrine, glucose, dextrose bolus **OR** dextrose bolus, glucagon (rDNA), dextrose, diphenhydrAMINE    Physical    VITALS:  BP (!) 116/49   Pulse 70   Temp 97.5 °F (36.4 °C)   Resp 18   Ht 5' 4\" (1.626 m)   Wt 266 lb 8.6 oz (120.9 kg)   SpO2 96%   BMI 45.75 kg/m²     24HR INTAKE/OUTPUT:      Intake/Output Summary (Last 24 hours) at 2023 0815  Last data filed at 2023 1541  Gross per 24 hour   Intake 400 ml   Output --   Net 400 ml         24HR PULSE OXIMETRY RANGE:    SpO2  Av.9 %  Min: 95 %  Max: 100 %    General appearance: alert, appears stated age, and cooperative  Lungs: clear to auscultation bilaterally  Heart: regular rate and rhythm, S1, S2 normal, no murmur, click, rub or gallop  Abdomen: soft, non-tender; bowel sounds normal; no masses,  no organomegaly  Extremities: extremities normal, atraumatic, no cyanosis or edema  Neurologic: Mental status: Alert, oriented, thought content appropriate    Data    CBC:   Recent Labs     23  0835 23  1835 23  0140 23  1310   WBC 12.6*  --   --  8.7   HGB 9.3* 9.5* 9.6* 9.0*   HCT 28.6* 28.9* 29.1* 27.4*   MCV 96.0  --   --  96.5     --   --  154         BMP:  Recent Labs     23  0835 01/17/23  1835 01/18/23  0355 01/19/23  0300     --  137 137   K 5.3*  --  3.7 4.0   CL 93*  --  94* 95*   CO2 23  --  32* 28   PHOS  --  3.2 4.4 5.3*   BUN 89*  --  34* 46*   CREATININE 8.6*  --  4.7* 6.3*      ALB:3,BILIDIR:3,BILITOT:3,ALKPHOS:3)@    PT/INR:   Recent Labs     01/17/23  0835 01/18/23  1310   PROTIME 16.8* 15.8*   INR 1.5 1.4         ABG:   No results for input(s): PH, PO2, PCO2, HCO3, BE, O2SAT, METHB, O2HB, COHB, O2CON, HHB, THB in the last 72 hours. Radiology/Other tests reviewed: none    Assessment:     Principal Problem:    Hematemesis  Active Problems:    H/O heart artery stent    Diabetes mellitus (HCC)    ESRD (end stage renal disease) ([facility] [address]    Essential hypertension    Diabetes mellitus type 2 with complications ([facility] [address]    Pulmonary hypertension, unspecified (HCC)    Obesity, Class III, BMI 40-49.9 (morbid obesity) ([facility] [address]  Resolved Problems:    * No resolved hospital problems. *  PE  ESRD on HD      Plan:       Await GI input if can resume eliquis, will need IVC filter if not  HD as per Renal  Cont with oxygen, taper as tolerated, was on 2-3 li as out-pt, may have room to decrease supplementation  OOB to chair, ambulate as tolerated      Time at the bedside, reviewing labs and radiographs, reviewing notes and consultations, discussing with staff and family was more than 35 minutes. Thanks for letting us see this patient in consultation. Please contact us with any questions. Office [phone] or after hours through [place], x [phone].

## 2023-01-19 NOTE — PROGRESS NOTES
PROGRESS NOTE        Patient Presents with/Seen in Consultation For      *Reason for Consult: Hematemesis, on apixaban for pulmonary embolus  CHIEF COMPLAINT:  dark brown emesis  Subjective:     Patient currently off the unit. Labs and chart extensively reviewed. Review of Systems  Aside from what was mentioned in the PMH and HPI, essentially unremarkable, all others negative.     Objective:     /68   Pulse 73   Temp 97.5 °F (36.4 °C)   Resp 18   Ht 5' 4\" (1.626 m)   Wt 266 lb 8.6 oz (120.9 kg)   SpO2 96%   BMI 45.75 kg/m²     DEFFERED-  patient off the unit     acetaminophen (TYLENOL) tablet 650 mg, Q6H PRN  insulin glargine (LANTUS) injection vial 24 Units, Nightly  midodrine (PROAMATINE) tablet 10 mg, Daily PRN  glucose chewable tablet 16 g, PRN  dextrose bolus 10% 125 mL, PRN   Or  dextrose bolus 10% 250 mL, PRN  glucagon (rDNA) injection 1 mg, PRN  dextrose 10 % infusion, Continuous PRN  insulin lispro (HUMALOG) injection vial 0-4 Units, TID WC  insulin lispro (HUMALOG) injection vial 0-4 Units, Nightly  diphenhydrAMINE (BENADRYL) tablet 25 mg, PRN  epoetin sadia-epbx (RETACRIT) injection 3,520 Units, Once per day on Mon Wed Fri  pantoprazole (PROTONIX) 40 mg in sodium chloride (PF) 0.9 % 10 mL injection, Daily         Data Review  CBC:   Lab Results   Component Value Date/Time    WBC 8.7 01/18/2023 01:10 PM    RBC 2.84 01/18/2023 01:10 PM    HGB 9.0 01/18/2023 01:10 PM    HCT 27.4 01/18/2023 01:10 PM    MCV 96.5 01/18/2023 01:10 PM    MCH 31.7 01/18/2023 01:10 PM    MCHC 32.8 01/18/2023 01:10 PM    RDW 14.3 01/18/2023 01:10 PM     01/18/2023 01:10 PM    MPV 10.1 01/18/2023 01:10 PM     CMP:    Lab Results   Component Value Date/Time     01/19/2023 03:00 AM    K 4.0 01/19/2023 03:00 AM    K 4.2 08/23/2022 12:12 PM    CL 95 01/19/2023 03:00 AM    CO2 28 01/19/2023 03:00 AM    BUN 46 01/19/2023 03:00 AM    CREATININE 6.3 01/19/2023 03:00 AM    GFRAA 8 09/22/2022 12:14 PM    LABGLOM 7 01/19/2023 03:00 AM    GLUCOSE 279 01/19/2023 03:00 AM    PROT 6.6 01/19/2023 03:00 AM    LABALBU 3.5 01/19/2023 03:00 AM    CALCIUM 8.3 01/19/2023 03:00 AM    BILITOT 0.3 01/19/2023 03:00 AM    ALKPHOS 89 01/19/2023 03:00 AM    AST 12 01/19/2023 03:00 AM    ALT 8 01/19/2023 03:00 AM     Hepatic Function Panel:    Lab Results   Component Value Date/Time    ALKPHOS 89 01/19/2023 03:00 AM    ALT 8 01/19/2023 03:00 AM    AST 12 01/19/2023 03:00 AM    PROT 6.6 01/19/2023 03:00 AM    BILITOT 0.3 01/19/2023 03:00 AM    BILIDIR <0.2 01/19/2023 03:00 AM    IBILI see below 01/19/2023 03:00 AM    LABALBU 3.5 01/19/2023 03:00 AM     No components found for: CHLPL    Lab Results   Component Value Date    TRIG 173 (H) 01/18/2023    TRIG 90 12/04/2022       Lab Results   Component Value Date    HDL 41 01/18/2023    HDL 52 12/04/2022       Lab Results   Component Value Date    LDLCALC 79 01/18/2023    LDLCALC 85 12/04/2022       Lab Results   Component Value Date    LABVLDL 35 01/18/2023    LABVLDL 18 12/04/2022      PT/INR:    Lab Results   Component Value Date/Time    PROTIME 15.8 01/18/2023 01:10 PM    INR 1.4 01/18/2023 01:10 PM     IRON:    Lab Results   Component Value Date/Time    IRON 35 01/18/2023 03:55 AM     Iron Saturation:  No components found for: PERCENTFE  FERRITIN:    Lab Results   Component Value Date/Time    FERRITIN 1,513 01/18/2023 03:55 AM         Assessment:   Active Problems:  Hematemesis  Anemia, normocytic  Poor appetite  ESRD on hemodialysis   On Eliquis for PE  DM  EGD 1/18/23-Grade B LA classification GERD and gastritis, minimal, biopsied to rule out H. pylori infection. Duodenum showed severe duodenitis with superficial ulceration. Biopsies were done.     Plan:   Increase Protonix to BID  Monitor any further emesis & document  Monitor stools  Diet as tolerated   Medical management per Primary, including pain  Supportive care  CBC daily  Continue to monitor      Note: This report was completed utilizing computer voice recognition software. Every effort has been made to ensure accuracy, however; inadvertent computerized transcription errors may be present.      Discussed with Dr. Leonides Person per Dr. Vanesa BOWLING-NP-C 1/19/2023  11:37 AM For Dr. Gavi Pratt

## 2023-01-19 NOTE — CARE COORDINATION
Social Work/Discharge Planning:  Social Work consult noted. Met with patient and her daughter Maria Victoria Matamoros and completed initial assessment. Explained Social Work role and discussed transition of care/discharge planning. She lives with her daughter in a one story house with one step to enter. PTA she uses a wheeled walker. She had an EGD with biopsy yesterday. She wears two liters oxygen supplied through [facility]. She had home health care in the past when she lived in [place]. She denies any snf history. Patient PCP is Dr. Karen Andujar and pharmacy is [facility]. She goes to dialysis every Tuesday, Thursday and Saturday at [facility] in [facility]. JENNIFER (ph: [phone]) will need to be notified when patient discharges. Patient plans to return home and denies any home care needs at this time. Called Eddie with [employer] and confirmed patient oxygen order is for  two liters continuously. Will continue to follow and assist with discharge planning.   Electronically signed by HILDA Guevara on 1/19/2023 at 1:11 PM

## 2023-01-19 NOTE — PROGRESS NOTES
PROGRESS  NOTE --                                                          INTERNAL  MEDICINE                                                                              I  PERSONALLY SAW , EXAMINED, AND CARED Shanique [address], 1/19/2023     LABS, XRAY ,CHART, AND MEDICATIONS  REVIEWED BY ME       Chief complaint: Vomiting blood      1/18/2023-SUBJECTIVE: Linford Stage is alert awake and cooperative; oriented ×3. Denies any chest pain dyspnea nausea emesis. Tolerating diet. No abdominal pain. Sitting in bedside chair; daughter present through the exam.  No complaints voiced at this time feels good. EGD scheduled for this afternoon. Afebrile last 24 hours. Blood pressure 123/52. SPO2 100 on 4 L nasal cannula. Intake and output -2200 cc. CO2 32 BUN 34 creatinine improved 4.7. Ionized calcium low at 1.08. Fasting glucose 198. LDL 79 triglycerides 173. Liver functions normal.  Most recent hemoglobin 9.6, minimal change since admission. Nephrology consult from yesterday appreciated. Retacrit while here; continue hemodialysis Tuesdays Thursdays Saturdays. Pulmonary consult yesterday appreciated. Hold apixaban for now no indication for reversal agent. Await GI input possible endoscopy to determine whether apixaban can be resumed. Patient underwent hemodialysis yesterday with net removal of 2200 cc. Pulmonary note from today, continue off apixaban until GI input. Nephrology note from today appreciated. Patient signed out of hemodialysis yesterday, too much discomfort laying on the gurney. (Patient states dialysis was shortened by 20 minutes). Patient does not think there are plans for EGD and hopes to be discharged. There are plans for EGD today. 1/19/2023-patient sitting in bedside chair, daughter present through the exam.  No chest pain no dyspnea at present time.   I discussed at length findings on endoscopy-multiple superficial ulcers. I also discussed possible IVC filter. Patient extremely reluctant to restart apixaban would rather IVC filter if possible. I was advised by physician advisor to change patient to inpatient status. Afebrile last 24 hours. Blood pressure 114/56. SPO2 96 on 2 L nasal cannula. Intake and output -4500 cc. BUN 46 creatinine 6.3 fasting glucose 279. Phosphorus 5.3 calcium low 8.3. Liver functions normal.  CBC pending. Physical therapy [facility] score from yesterday 24/24. Patient underwent EGD yesterday with following results--    DESCRIPTION OF PROCEDURE:  With the patient in the left lateral  decubitus position, the Olympus GIF-100 forward-viewing videoscope was  introduced into the esophagus, the evaluation of which showed grade B LA  classification GERD and no hiatal hernia was seen. The scope was then advanced through the gastroesophageal junction into  the gastric body, along the greater curvature. Evaluation of the body  of the stomach showed minimal gastritis. Biopsies were taken from this  area to rule out Helicobacter pylori infection. The scope was then advanced through the pylorus into the duodenal bulb  and second portion of the duodenum and evaluation showed severe  duodenitis with edema and superficial ulceration. Biopsies were done to  rule out sprue. The scope was then retrieved and retroflexed in the  prepyloric antrum, with thorough evaluation of the cardiac and fundal  portions of the stomach, which appeared to be within normal limits. The scope was then straightened, the area deflated, and the procedure  was terminated by withdrawing the scope and conducting a second look on  the way out, which was essentially the same. The patient tolerated the procedure well. Pulmonary note from today appreciated. No difficulties with breathing. Await GI input regarding resumption of apixaban; may need IVC filte if no approval for apixaban. Continue oxygen taper as tolerated. GI note from today appreciated. Increase Protonix to twice daily. Monitor stools. Nephrology note from today, on 2 L nasal cannula no vomiting no abdominal pain wants to be discharged. Continue hemodialysis; continue Retacrit while here. Patient underwent hemodialysis today, net removal of 2700 cc.        Objective:     PHYSICAL EXAM:    VS: BP (!) 114/56   Pulse 71   Temp 97.6 °F (36.4 °C)   Resp 16   Ht 5' 4\" (1.626 m)   Wt 260 lb 9.3 oz (118.2 kg)   SpO2 96%   BMI 44.73 kg/m²     Labs:   CBC:   Lab Results   Component Value Date/Time    WBC 8.7 01/18/2023 01:10 PM    RBC 2.84 01/18/2023 01:10 PM    HGB 9.0 01/18/2023 01:10 PM    HCT 27.4 01/18/2023 01:10 PM    MCV 96.5 01/18/2023 01:10 PM    MCH 31.7 01/18/2023 01:10 PM    MCHC 32.8 01/18/2023 01:10 PM    RDW 14.3 01/18/2023 01:10 PM     01/18/2023 01:10 PM    MPV 10.1 01/18/2023 01:10 PM     CBC with Differential:    Lab Results   Component Value Date/Time    WBC 8.7 01/18/2023 01:10 PM    RBC 2.84 01/18/2023 01:10 PM    HGB 9.0 01/18/2023 01:10 PM    HCT 27.4 01/18/2023 01:10 PM     01/18/2023 01:10 PM    MCV 96.5 01/18/2023 01:10 PM    MCH 31.7 01/18/2023 01:10 PM    MCHC 32.8 01/18/2023 01:10 PM    RDW 14.3 01/18/2023 01:10 PM    NRBC 0.0 01/13/2022 04:38 AM    LYMPHOPCT 12.6 01/18/2023 01:10 PM    MONOPCT 5.6 01/18/2023 01:10 PM    BASOPCT 0.6 01/18/2023 01:10 PM    MONOSABS 0.49 01/18/2023 01:10 PM    LYMPHSABS 1.09 01/18/2023 01:10 PM    EOSABS 0.82 01/18/2023 01:10 PM    BASOSABS 0.05 01/18/2023 01:10 PM     Hemoglobin/Hematocrit:    Lab Results   Component Value Date/Time    HGB 9.0 01/18/2023 01:10 PM    HCT 27.4 01/18/2023 01:10 PM     CMP:    Lab Results   Component Value Date/Time     01/19/2023 03:00 AM    K 4.0 01/19/2023 03:00 AM    K 4.2 08/23/2022 12:12 PM    CL 95 01/19/2023 03:00 AM    CO2 28 01/19/2023 03:00 AM    BUN 46 01/19/2023 03:00 AM    CREATININE 6.3 01/19/2023 03:00 AM    GFRAA 8 09/22/2022 12:14 PM    LABGLOM 7 01/19/2023 03:00 AM    GLUCOSE 279 01/19/2023 03:00 AM    PROT 6.6 01/19/2023 03:00 AM    LABALBU 3.5 01/19/2023 03:00 AM    CALCIUM 8.3 01/19/2023 03:00 AM    BILITOT 0.3 01/19/2023 03:00 AM    ALKPHOS 89 01/19/2023 03:00 AM    AST 12 01/19/2023 03:00 AM    ALT 8 01/19/2023 03:00 AM     BMP:    Lab Results   Component Value Date/Time     01/19/2023 03:00 AM    K 4.0 01/19/2023 03:00 AM    K 4.2 08/23/2022 12:12 PM    CL 95 01/19/2023 03:00 AM    CO2 28 01/19/2023 03:00 AM    BUN 46 01/19/2023 03:00 AM    LABALBU 3.5 01/19/2023 03:00 AM    CREATININE 6.3 01/19/2023 03:00 AM    CALCIUM 8.3 01/19/2023 03:00 AM    GFRAA 8 09/22/2022 12:14 PM    LABGLOM 7 01/19/2023 03:00 AM    GLUCOSE 279 01/19/2023 03:00 AM     Hepatic Function Panel:    Lab Results   Component Value Date/Time    ALKPHOS 89 01/19/2023 03:00 AM    ALT 8 01/19/2023 03:00 AM    AST 12 01/19/2023 03:00 AM    PROT 6.6 01/19/2023 03:00 AM    BILITOT 0.3 01/19/2023 03:00 AM    BILIDIR <0.2 01/19/2023 03:00 AM    IBILI see below 01/19/2023 03:00 AM    LABALBU 3.5 01/19/2023 03:00 AM     Ionized Calcium:  No results found for: IONCA  Magnesium:    Lab Results   Component Value Date/Time    MG 2.2 01/19/2023 03:00 AM     Phosphorus:    Lab Results   Component Value Date/Time    PHOS 5.3 01/19/2023 03:00 AM     LDH:    Lab Results   Component Value Date/Time     01/11/2022 12:45 PM     Uric Acid:    Lab Results   Component Value Date/Time    LABURIC 3.3 12/04/2022 05:30 AM     PT/INR:    Lab Results   Component Value Date/Time    PROTIME 15.8 01/18/2023 01:10 PM    INR 1.4 01/18/2023 01:10 PM     Warfarin PT/INR:  No components found for: PTPATWAR, PTINRWAR  PTT:    Lab Results   Component Value Date/Time    APTT 34.5 01/17/2023 08:35 AM   [APTT}  Troponin:  No results found for: TROPONINI  Last 3 Troponin:  No results found for: TROPONINI  U/A:    Lab Results   Component Value Date/Time    COLORU Yellow 11/18/2021 03:19 AM    PROTEINU >=300 11/18/2021 03:19 AM    PHUR 6.0 11/18/2021 03:19 AM    WBCUA 1-3 11/18/2021 03:19 AM    RBCUA 0-1 11/18/2021 03:19 AM    BACTERIA FEW 11/18/2021 03:19 AM    CLARITYU Clear 11/18/2021 03:19 AM    SPECGRAV 1.020 11/18/2021 03:19 AM    LEUKOCYTESUR Negative 11/18/2021 03:19 AM    UROBILINOGEN 0.2 11/18/2021 03:19 AM    BILIRUBINUR Negative 11/18/2021 03:19 AM    BLOODU SMALL 11/18/2021 03:19 AM    GLUCOSEU 500 11/18/2021 03:19 AM     ABG:  No results found for: PH, PCO2, PO2, HCO3, BE, THGB, TCO2, O2SAT  HgBA1c:    Lab Results   Component Value Date/Time    LABA1C 11.9 01/17/2023 06:35 PM     FLP:    Lab Results   Component Value Date/Time    TRIG 173 01/18/2023 03:55 AM    HDL 41 01/18/2023 03:55 AM    LDLCALC 79 01/18/2023 03:55 AM    LABVLDL 35 01/18/2023 03:55 AM     TSH:    Lab Results   Component Value Date/Time    TSH 1.470 12/03/2022 12:09 PM     VITAMIN B12: No components found for: B12  FOLATE:  No results found for: FOLATE  IRON:    Lab Results   Component Value Date/Time    IRON 35 01/18/2023 03:55 AM     Iron Saturation:  No components found for: PERCENTFE  TIBC:    Lab Results   Component Value Date/Time    TIBC 173 01/18/2023 03:55 AM     FERRITIN:    Lab Results   Component Value Date/Time    FERRITIN 1,513 01/18/2023 03:55 AM        General appearance: Alert, Awake, Oriented times 3, no distress ; morbidly obese; nasal cannula oxygen in place  Skin: Warm and dry ; no rashes  Head: Normocephalic. No masses, lesions or tenderness noted  Eyes: Conjunctivae pale, sclera white. PERRL,EOM-I  Ears: External ears normal  Nose/Sinuses: Nares normal. Septum midline. Mucosa normal. No drainage  Oropharynx: Oropharynx clear with no exudate seen  Neck: Supple. No jugular venous distension, lymphadenopathy or thyromegaly Trachea midline  Lungs: Clear to auscultation bilaterally. No rhonchi, crackles or wheezes  Heart: S1 S2  Regular rate and rhythm.  No rub, gallop, murmur  Abdomen: Soft, mildly tender right upper quadrant and epigastrium. BS normal. No masses, organomegaly; no rebound or guarding  Extremities: Chronic 2-3+ bilateral edema since starting dialysis. Musculoskeletal: Muscular strength appears intact. Neuro:  No focal motor defects ; II-XII grossly intact . MORRIS equally    TELEMETRY: REVIEWED--Telemetry: Sinus    ASSESSMENT:   Principal Problem:    Hematemesis  Active Problems:    H/O heart artery stent    Diabetes mellitus ([facility] [address]    ESRD (end stage renal disease) ([facility] [address]    Essential hypertension    Diabetes mellitus type 2 with complications ([facility] [address]    Pulmonary hypertension, unspecified (HCC)    Obesity, Class III, BMI 40-49.9 (morbid obesity) ([facility] [address]  Resolved Problems:    * No resolved hospital problems. *      PLAN:  SEE ORDERS      RE  CHANGES AND FINDINGS   Medications reviewed with patient  GI prophylaxis  DVT prophylaxis  Consultants notes reviewed  Apixaban and anticoagulants on hold  Lantus 24 units nightly  Low-dose sliding scale insulin  Protonix 40 mg IV  Continue hemodialysis  Midodrine 10 mg as needed for low blood pressure dialysis  EGD today  1/19/2023  Continue hemodialysis  Apixaban anticoagulants on hold  Need further input from GI, if no resumption of apixaban will need IVC filter  Lantus 24 units nightly  Low-dose sliding scale insulin  Protonix increased to 40 mg IV every 12 hours  Vascular consult for IVC filter        Discussed with patient and adult child and nursing. [address]     12:59 PM     1/19/2023    TIME > 35 MINUTES      Please note that over 35 minutes was spent .   Greater than 51% includes interviewing patient and reviewing chart; performing medical examination; ordering medications, tests and/or procedures; formulating assessment plan, reviewing rationale for the above recommendations; reviewing available records, results of all previously ordered tests and procedures and current problem list; counseling/educating the patient; coordinating care with other healthcare professionals; communicating results to the patient's family/caregiver; documenting clinical information in the electronic record                    ------------  INFORMATION  -----------      DIET:ADULT DIET; Regular; 4 carb choices (60 gm/meal); GI [place] (GERD/Peptic Ulcer)        Allergies   Allergen Reactions    Pcn [Penicillins] Shortness Of Breath and Rash    Benzonatate Other (See Comments)     \"PATIENTS STATES \"IT WAS LIKE I WAS DRUNK. \"    Morphine Itching and Rash    Sodium Hypochlorite Nausea And Vomiting and Rash     AKA BLEACH. RASH FROM SKIN EXPOSURE          MEDICATION SIDE EFFECTS:none       SCHEDULED MEDS:                                 Scheduled Meds:   pantoprazole (PROTONIX) 40 mg injection  40 mg IntraVENous Q12H    insulin glargine  24 Units SubCUTAneous Nightly    insulin lispro  0-4 Units SubCUTAneous TID WC    insulin lispro  0-4 Units SubCUTAneous Nightly    epoetin sadia-epbx  30 Units/kg SubCUTAneous Once per day on Mon Wed Fri       Continuous Infusions:   dextrose           Data:       Intake/Output Summary (Last 24 hours) at 1/19/2023 1259  Last data filed at 1/19/2023 1125  Gross per 24 hour   Intake 700 ml   Output 3000 ml   Net -2300 ml       Wt Readings from Last 3 Encounters:   01/19/23 260 lb 9.3 oz (118.2 kg)   12/22/22 261 lb (118.4 kg)   12/13/22 250 lb (113.4 kg)       Labs: Additional    GLUCOSE:No results for input(s): POCGLU in the last 72 hours. BNP:No results found for: BNP    CRP:   Recent Labs     01/17/23  1835   CRP 5.6*       ESR:No results for input(s): SEDRATE in the last 72 hours.     RADIOLOGY: REVIEWED AVAILABLE REPORT  No orders to display             Alberta Heaton DO    12:59 PM     1/19/2023      Voice recognition software used for dictation

## 2023-01-19 NOTE — PROGRESS NOTES
[facility] for pt to go to [facility] for IVC filter.    time is 6:30am

## 2023-01-19 NOTE — FLOWSHEET NOTE
01/19/23 1125   Vital Signs   BP (!) 114/56   Temp 97.6 °F (36.4 °C)   Heart Rate 71   Resp 16   Weight 260 lb 9.3 oz (118.2 kg)   Weight Method Estimated   Percent Weight Change -2.23   Pain Assessment   Pain Assessment None - Denies Pain   Post-Hemodialysis Assessment   Post-Treatment Procedures Blood returned; Access bleeding time < 10 minutes   Machine Disinfection Process Acid/Vinegar Clean;Heat Disinfect; Exterior Machine Disinfection   Rinseback Volume (ml) 300 ml   Blood Volume Processed (Liters) 88.4 l/min   Dialyzer Clearance Clear   Duration of Treatment (minutes) 240 minutes   Hemodialysis Intake (ml) 300 ml   Hemodialysis Output (ml) 3000 ml   NET Removed (ml) 2700   Tolerated Treatment Good   Patient Response to Treatment Tolerated tx well, hemostasis achieved, (+)B/T, stable at dc   Bilateral Breath Sounds Diminished   Edema Right lower extremity; Left lower extremity;Generalized   Edema Generalized +1   RLE Edema +3;Non-pitting   LLE Edema +3;Non-pitting   Patient Disposition Return to room

## 2023-01-19 NOTE — CONSULTS
Chief Complaint: Patient seen for evaluation of insistent for vena cava filter    HPI: This patient, who was diagnosed of pulmonary embolus, on 3 December, segmental subsegmental branches of the right upper lobe has been on anticoagulation with Eliquis, recently developed hematemesis, was hospitalized, and the anticoagulation discontinued and patient did undergo upper endoscopy that revealed severe duodenitis with ulceration, patient was started on Protonix, and today vascular service is consulted, for the placement of IVC filter, as patient is afraid to go home and started having GI bleeding again    Its been noted, patient in the past did have history of anemia, and did undergo endoscopy in 1 December 2021, revealed evidence of GERD gastritis    Patient underwent repeat endoscopy yesterday, revealed severe duodenitis with superficial ulceration        When I came to see patient, noted, patient is a supermarket, 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 Donita Francis      Patient denies any focal lateralizing neurological symptoms like loss of speech, vision or loss of function of extremity    Patient can walk short distances only limited because of her physical condition as well as shortness of breath, and denies any symptoms of rest pain    Allergies   Allergen Reactions    Pcn [Penicillins] Shortness Of Breath and Rash    Benzonatate Other (See Comments)     \"PATIENTS STATES \"IT WAS LIKE I WAS DRUNK. \"    Morphine Itching and Rash    Sodium Hypochlorite Nausea And Vomiting and Rash     AKA BLEACH.   RASH FROM SKIN EXPOSURE        Current Facility-Administered Medications   Medication Dose Route Frequency Provider Last Rate Last Admin    pantoprazole (PROTONIX) 40 mg in sodium chloride (PF) 0.9 % 10 mL injection  40 mg IntraVENous Q12H Kari LowjustusTAWNYA CNP        acetaminophen (TYLENOL) tablet 650 mg  650 mg Oral Q6H PRN Gilford Spitz Mihok, DO   650 mg at 01/19/23 0402    insulin glargine (LANTUS) injection vial 24 Units  24 Units SubCUTAneous Nightly Gilford Spitz Mihok, DO   24 Units at 01/18/23 2011    midodrine (PROAMATINE) tablet 10 mg  10 mg Oral Daily PRN Gilford Spitz Mihok, DO        glucose chewable tablet 16 g  4 tablet Oral PRN Gilford Spitz Mihok, DO        dextrose bolus 10% 125 mL  125 mL IntraVENous PRN Gilford Spitz Mihok, DO        Or    dextrose bolus 10% 250 mL  250 mL IntraVENous PRN Loc Anne, DO        glucagon (rDNA) injection 1 mg  1 mg IntraMUSCular PRN Stu Anne, DO        dextrose 10 % infusion   IntraVENous Continuous PRN Loc Anne DO        insulin lispro (HUMALOG) injection vial 0-4 Units  0-4 Units SubCUTAneous TID WC Loc Anne, DO   2 Units at 01/19/23 1608    insulin lispro (HUMALOG) injection vial 0-4 Units  0-4 Units SubCUTAneous Nightly Loc Anne, DO        diphenhydrAMINE (BENADRYL) tablet 25 mg  25 mg Oral PRN Zac Gill MD        epoetin sadia-epbx (RETACRIT) injection 3,520 Units  30 Units/kg SubCUTAneous Once per day on Mon Wed Fri Zac Gill MD   3,520 Units at 01/18/23 0827       Past Medical History:   Diagnosis Date    Acute pulmonary embolism ([facility] 12/03/2022    Anemia in CKD (chronic kidney disease) 11/30/2021    Anxiety and depression 01/19/2022    At high risk for falls 01/19/2022    Brain aneurysm     CLABSI (central line-associated bloodstream infection) 11/19/2021    Cough 01/19/2022    Diabetes mellitus ([facility] 2006    Diabetes mellitus type 2 with complications ([facility] 16/20/6494    Dizziness on standing 01/19/2022    ESRD (end stage renal disease) ([address] 11/19/2021    ESRD on hemodialysis ([facility] 11/19/2021    Essential hypertension 11/30/2021    Fever, unspecified     Gastrointestinal hemorrhage 11/30/2021    H/O heart artery stent 2015    Done in [place]    History of Clostridioides difficile colitis 2022    Hyperlipidemia     Hypertension     MSSA bacteremia 2021    Nausea & vomiting 2021    Obesity, Class III, BMI 40-49.9 (morbid obesity) ([facility]     Periumbilical abdominal pain        Past Surgical History:   Procedure Laterality Date    CARDIAC CATHETERIZATION  2015    Done in [address]  2017    Negative findings    DIALYSIS FISTULA CREATION Left 2022    REVISION AV FISTULA LEFT ARM performed by Phillip Penaloza MD at [address]    PTCA      PTCA [address] ENDOSCOPY N/A 2021    EGD BIOPSY performed by Mohit Buenrostro MD at [address] [place] [address] N/A 2023    EGD BIOPSY performed by Mohit Buenrostro MD at [address] [place] [address] N/A 2021    CATHETER INSERTION  TUNNELED HEMODIALYSIS, WITH REMOVAL OF TEMPORARY CATHETER performed by Phillip Penaloza MD at [address]       Family History   Problem Relation Age of Onset    Coronary Art Dis Mother          age 62 acute MI    Coronary Art Dis Father          age 62 CVA    Coronary Art Dis Brother        Social History     Socioeconomic History    Marital status:       Spouse name: Not on file    Number of children: Not on file    Years of education: Not on file    Highest education level: Not on file   Occupational History    Not on file   Tobacco Use    Smoking status: Former     Packs/day: 1.00     Years: 12.00     Pack years: 12.00     Types: Cigarettes     Start date: 66     Quit date:      Years since quittin.0    Smokeless tobacco: Never   Vaping Use    Vaping Use: Never used   Substance and Sexual Activity    Alcohol use: Never    Drug use: Never    Sexual activity: Not Currently   Other Topics Concern    Not on file   Social History Narrative    Not on file Social Determinants of Health     Financial Resource Strain: Low Risk     Difficulty of Paying Living Expenses: Not hard at all   Food Insecurity: No Food Insecurity    Worried About Running Out of Food in the Last Year: Never true    Ran Out of Food in the Last Year: Never true   Transportation Needs: Not on file   Physical Activity: Not on file   Stress: Not on file   Social Connections: Not on file   Intimate Partner Violence: Not on file   Housing Stability: Not on file       Review of Systems:  Skin:  No abnormal pigmentation or rash. Eyes:  No blurring, diplopia or vision loss. Ears/Nose/Throat:  No hearing loss or vertigo. Respiratory:  No cough, pleuritic chest pain, dyspnea, or wheezing. Chronic obstructive lung disease on supplemental oxygen 2 to 3 L/min    Cardiovascular: No angina, palpitations . Coronary artery disease with history of coronary artery angioplasty and stent placement, hypertension, hyperlipidemia    Gastrointestinal:  No nausea or vomiting; no abdominal pain or rectal bleeding. History of GI bleeding, in the past with anemia, GERD, gastritis, recent he had hematemesis, endoscopy revealed severe duodenitis with superficial ulceration    Musculoskeletal:  No arthritis or weakness. Neurologic:  No paralysis, paresis, seizures or headaches. History of brain aneurysm    Hematologic/Lymphatic/Immunologic:  No anemia, abnormal bleeding/bruising. Endocrine:  No heat or cold intolerance. No polyphagia, polydipsia or polyuria. End-stage renal disease, currently on hemodialysis through a left brachiocephalic arteriovenous fistula      Physical Exam:  BP (!) 121/54   Pulse 57   Temp 98.4 °F (36.9 °C) (Oral)   Resp 18   Ht 5' 4\" (1.626 m)   Wt 260 lb 9.3 oz (118.2 kg)   SpO2 98%   BMI 44.73 kg/m²   General appearance:  Alert, awake, oriented x 3. No distress. Skin:  Warm and dry. Head:  Normocephalic. No masses, lesions or tenderness.   Eyes:  Conjunctivae appear normal; PERRL. Ears:  External ears normal.  Nose/Sinuses:  Septum midline, mucosa normal; no drainage. Oropharynx:  Clear, no exudate noted. Neck:  No jugular venous distention, lymphadenopathy or thyromegaly. No evidence of carotid bruit      Lungs:  Clear to ausculation bilaterally. No rhonchi, crackles, wheezes. Heart:  Regular rate and rhythm. No rub or murmur. .    Abdomen:  Soft, non-tender. No masses, organomegaly. Musculoskeletal: No joint effusions, tenderness swelling or warmth. Neuro: Speech is intact. Moving all extremities. No focal motor or sensory deficits. Extremities:  Both feet are warm to touch. The color of both feet is normal.    Patient is a moderately severe swelling of both lower extremity mainly below the knee, consider lymphedema tells me she has longstanding swelling of the legs, goes up and down denies any previous history of deep vein thrombosis      Patient does have a left brachiocephalic arteriovenous fistula with a thrill and a bruit      Pulses Right  Left    Brachial 3 3    Radial    3=normal   Femoral 2 2  2=diminished   Popliteal    1=barely palpable   Dorsalis pedis 1 1  0=absent   Posterior tibial    4=aneurysmal           Other pertinent information:1. The past medical records were reviewed.     2.    Lab Results   Component Value Date    WBC 9.3 01/19/2023    HGB 9.8 (L) 01/19/2023    HCT 30.4 (L) 01/19/2023    MCV 96.2 01/19/2023     01/19/2023      Lab Results   Component Value Date     01/19/2023    K 4.0 01/19/2023    CL 95 (L) 01/19/2023    CO2 28 01/19/2023    BUN 46 (H) 01/19/2023    CREATININE 6.3 (H) 01/19/2023    GLUCOSE 279 (H) 01/19/2023    CALCIUM 8.3 (L) 01/19/2023    PROT 6.6 01/19/2023    LABALBU 3.5 01/19/2023    BILITOT 0.3 01/19/2023    ALKPHOS 89 01/19/2023    AST 12 01/19/2023    ALT 8 01/19/2023    LABGLOM 7 01/19/2023    GFRAA 8 09/22/2022     Lab Results   Component Value Date    APTT 34.5 01/17/2023      Lab Results   Component Value Date    INR 1.4 01/18/2023    INR 1.5 01/17/2023    INR 1.0 08/23/2022    PROTIME 15.8 (H) 01/18/2023    PROTIME 16.8 (H) 01/17/2023    PROTIME 11.0 08/23/2022        3. The history physical, pulmonary, nephrology, gastroenterology consultation notes were reviewed along with review of the gastroenterology procedure, upper endoscopy that revealed severe duodenitis with superficial ulceration      4. The CT of the chest that was done in December was personally reviewed, segmental pulm embolus of right upper lobe along with subsegmental pulmonary emboli also noted    5. Venous ultrasound studies done in December revealed no evidence of deep vein thrombosis        Assessment:      1. Pulmonary embolus in December, segmental pulmonary embolus as a subsegmental emboli, on personal interpretation of the CT of the chest done in December    2. Hematemesis this admission, past history of GI bleeding and anemia, recent endoscopy done yesterday revealing evidence of severe duodenitis with superficial ulceration    3.   Multiple comorbid risk factors including oxygen dependent chronic obstructive lung disease on supplemental oxygen 2 to 3 L/min, coronary artery disease with coronary artery angioplasty stent placement, hypertension, hyperlipidemia, history of brain aneurysm, end-stage renal disease currently on hemodialysis through a left brachiocephalic arteriovenous fistula, lymphedema of both lower extremities            Patient Active Problem List   Diagnosis    Nausea & vomiting    MSSA bacteremia    CLABSI (central line-associated bloodstream infection)    ESRD (end stage renal disease) (HCC)    Fever, unspecified    Essential hypertension    Hyperlipidemia    Diabetes mellitus type 2 with complications (HCC)    Neck pain    Anemia in CKD (chronic kidney disease)    Pneumonia due to COVID-19 virus    Pulmonary hypertension, unspecified (HCC)    Obesity, Class III, BMI 40-49.9 (morbid obesity) [facility])    History of Clostridioides difficile colitis    Anxiety and depression    At high risk for falls    Dizziness on standing    Periumbilical abdominal pain    Encounter regarding vascular access for dialysis for end-stage renal disease ([facility]    Acute pulmonary embolism ([facility]    H/O heart artery stent    Pulmonary embolism and infarction ([facility]    Diabetes mellitus ([facility]    Hematemesis            Plan:           Discussed in detail with the patient regarding all options, risks benefits and alternatives, patient informed me that she is afraid to go home on oral anticoagulation because of concern about bleeding and hematemesis, in the past also was worked up for GI bleeding and anemia etc.    She would prefer to be treated for her hematemesis with medications, include Protonix etc. and once the ulcer is healed, consider reinitiation of anticoagulation and wants to consider go ahead with the filter placement by Dr. Lacho Julien and associates    Patient was told, that she will be transferred to [facility] we will plan for the IVC filter by Dr. Lacho Julien, known to her from the past vascular intervention procedure for the AV fistula of the left arm    The risks, benefits, options and alternatives were clearly explained including bleeding, clotting, infection, arterial, venous, nerve, cardiopulmonary complications, chances of major complications which they understand and consent for surgery.      Also because of increasing swelling of both lower extremities since December, we will repeat the venous ultrasound study again    Informed her that I have personally discussed with her gastroenterologist Dr. Laureen Cameron, who informed me that he may reconsider endoscopy number 3 weeks, at that time he will notify Dr. Lacho Julien, if okay to anticoagulate, at the time consider possibility of retrieval of vena cava filter    I have also personally discussed with Dr. Grace Sandhu    All  questions were answered     Patient is tentatively scheduled for IVC filter in the cardiac Cath Lab at 9:30 AM tomorrow morning, discussed with nursing staff, they have already made arrangements for the transportation with patient, and after procedure, patient will be brought back to the [facility]    I will also notify Dr. Brady Roca of the surgical procedure that is scheduled for him tomorrow    Thank you for letting us participate in the care of your patient        Electronically signed by Kwadwo Pleitez MD on 1/19/2023 at 6:52 PM

## 2023-01-20 ENCOUNTER — HOSPITAL ENCOUNTER (OUTPATIENT)
Age: 68
Discharge: HOME | End: 2023-01-20
Payer: MEDICARE

## 2023-01-20 LAB
ALBUMIN SERPL-MCNC: 3.9 G/DL (ref 3.5–5.2)
ALP BLD-CCNC: 90 U/L (ref 35–104)
ALT SERPL-CCNC: 7 U/L (ref 0–32)
ANION GAP SERPL CALCULATED.3IONS-SCNC: 12 MMOL/L (ref 7–16)
AST SERPL-CCNC: 12 U/L (ref 0–31)
BASOPHILS ABSOLUTE: 0.05 E9/L (ref 0–0.2)
BASOPHILS RELATIVE PERCENT: 0.6 % (ref 0–2)
BILIRUB SERPL-MCNC: 0.4 MG/DL (ref 0–1.2)
BILIRUBIN DIRECT: <0.2 MG/DL (ref 0–0.3)
BILIRUBIN, INDIRECT: NORMAL MG/DL (ref 0–1)
BUN BLDV-MCNC: 24 MG/DL (ref 6–23)
CALCIUM SERPL-MCNC: 8.6 MG/DL (ref 8.6–10.2)
CHLORIDE BLD-SCNC: 93 MMOL/L (ref 98–107)
CLOTEST: NORMAL
CO2: 31 MMOL/L (ref 22–29)
CREAT SERPL-MCNC: 4.3 MG/DL (ref 0.5–1)
EOSINOPHILS ABSOLUTE: 1.03 E9/L (ref 0.05–0.5)
EOSINOPHILS RELATIVE PERCENT: 11.7 % (ref 0–6)
GFR SERPL CREATININE-BSD FRML MDRD: 11 ML/MIN/1.73
GLUCOSE BLD-MCNC: 255 MG/DL (ref 74–99)
HCT VFR BLD CALC: 30.7 % (ref 34–48)
HEMOGLOBIN: 9.7 G/DL (ref 11.5–15.5)
IMMATURE GRANULOCYTES #: 0.04 E9/L
IMMATURE GRANULOCYTES %: 0.5 % (ref 0–5)
LYMPHOCYTES ABSOLUTE: 1.39 E9/L (ref 1.5–4)
LYMPHOCYTES RELATIVE PERCENT: 15.8 % (ref 20–42)
MAGNESIUM: 2.1 MG/DL (ref 1.6–2.6)
MCH RBC QN AUTO: 31 PG (ref 26–35)
MCHC RBC AUTO-ENTMCNC: 31.6 % (ref 32–34.5)
MCV RBC AUTO: 98.1 FL (ref 80–99.9)
METER GLUCOSE: 195 MG/DL (ref 74–99)
METER GLUCOSE: 221 MG/DL (ref 74–99)
METER GLUCOSE: 318 MG/DL (ref 74–99)
METER GLUCOSE: 329 MG/DL (ref 74–99)
MONOCYTES ABSOLUTE: 0.71 E9/L (ref 0.1–0.95)
MONOCYTES RELATIVE PERCENT: 8 % (ref 2–12)
NEUTROPHILS ABSOLUTE: 5.6 E9/L (ref 1.8–7.3)
NEUTROPHILS RELATIVE PERCENT: 63.4 % (ref 43–80)
PDW BLD-RTO: 14.5 FL (ref 11.5–15)
PHOSPHORUS: 3.8 MG/DL (ref 2.5–4.5)
PLATELET # BLD: 141 E9/L (ref 130–450)
PMV BLD AUTO: 9.7 FL (ref 7–12)
POTASSIUM SERPL-SCNC: 4.1 MMOL/L (ref 3.5–5)
RBC # BLD: 3.13 E12/L (ref 3.5–5.5)
SODIUM BLD-SCNC: 136 MMOL/L (ref 132–146)
TOTAL PROTEIN: 7.4 G/DL (ref 6.4–8.3)
WBC # BLD: 8.8 E9/L (ref 4.5–11.5)

## 2023-01-20 PROCEDURE — 80076 HEPATIC FUNCTION PANEL: CPT

## 2023-01-20 PROCEDURE — 84100 ASSAY OF PHOSPHORUS: CPT

## 2023-01-20 PROCEDURE — 82962 GLUCOSE BLOOD TEST: CPT

## 2023-01-20 PROCEDURE — 6360000002 HC RX W HCPCS: Performed by: INTERNAL MEDICINE

## 2023-01-20 PROCEDURE — 85025 COMPLETE CBC W/AUTO DIFF WBC: CPT

## 2023-01-20 PROCEDURE — C9113 INJ PANTOPRAZOLE SODIUM, VIA: HCPCS | Performed by: NURSE PRACTITIONER

## 2023-01-20 PROCEDURE — 6370000000 HC RX 637 (ALT 250 FOR IP): Performed by: INTERNAL MEDICINE

## 2023-01-20 PROCEDURE — 36415 COLL VENOUS BLD VENIPUNCTURE: CPT

## 2023-01-20 PROCEDURE — C1769 GUIDE WIRE: HCPCS

## 2023-01-20 PROCEDURE — 2060000000 HC ICU INTERMEDIATE R&B

## 2023-01-20 PROCEDURE — 2580000003 HC RX 258: Performed by: NURSE PRACTITIONER

## 2023-01-20 PROCEDURE — 6360000002 HC RX W HCPCS

## 2023-01-20 PROCEDURE — A4216 STERILE WATER/SALINE, 10 ML: HCPCS | Performed by: NURSE PRACTITIONER

## 2023-01-20 PROCEDURE — 80048 BASIC METABOLIC PNL TOTAL CA: CPT

## 2023-01-20 PROCEDURE — 2700000000 HC OXYGEN THERAPY PER DAY

## 2023-01-20 PROCEDURE — C1880 VENA CAVA FILTER: HCPCS

## 2023-01-20 PROCEDURE — C1894 INTRO/SHEATH, NON-LASER: HCPCS

## 2023-01-20 PROCEDURE — 83735 ASSAY OF MAGNESIUM: CPT

## 2023-01-20 PROCEDURE — 2709999900 HC NON-CHARGEABLE SUPPLY

## 2023-01-20 PROCEDURE — 6360000002 HC RX W HCPCS: Performed by: NURSE PRACTITIONER

## 2023-01-20 PROCEDURE — 37191 INS ENDOVAS VENA CAVA FILTR: CPT

## 2023-01-20 RX ORDER — ONDANSETRON 2 MG/ML
4 INJECTION INTRAMUSCULAR; INTRAVENOUS EVERY 6 HOURS PRN
Status: DISCONTINUED | OUTPATIENT
Start: 2023-01-20 | End: 2023-01-21 | Stop reason: HOSPADM

## 2023-01-20 RX ADMIN — EPOETIN ALFA-EPBX 3520 UNITS: 4000 INJECTION, SOLUTION INTRAVENOUS; SUBCUTANEOUS at 11:59

## 2023-01-20 RX ADMIN — INSULIN LISPRO 1 UNITS: 100 INJECTION, SOLUTION INTRAVENOUS; SUBCUTANEOUS at 06:41

## 2023-01-20 RX ADMIN — INSULIN GLARGINE 24 UNITS: 100 INJECTION, SOLUTION SUBCUTANEOUS at 20:19

## 2023-01-20 RX ADMIN — SODIUM CHLORIDE 40 MG: 9 INJECTION, SOLUTION INTRAMUSCULAR; INTRAVENOUS; SUBCUTANEOUS at 20:18

## 2023-01-20 RX ADMIN — INSULIN LISPRO 3 UNITS: 100 INJECTION, SOLUTION INTRAVENOUS; SUBCUTANEOUS at 15:50

## 2023-01-20 RX ADMIN — INSULIN LISPRO 4 UNITS: 100 INJECTION, SOLUTION INTRAVENOUS; SUBCUTANEOUS at 20:23

## 2023-01-20 RX ADMIN — ONDANSETRON 4 MG: 2 INJECTION INTRAMUSCULAR; INTRAVENOUS at 14:29

## 2023-01-20 ASSESSMENT — PAIN SCALES - GENERAL
PAINLEVEL_OUTOF10: 0

## 2023-01-20 NOTE — PROGRESS NOTES
Vascular:    Sequence of events noted    Right groin puncture site looks clean without hematoma    Discussed with the patient, upon discharge, patient was instructed, to make an appointment to see Dr. Shoshana Lefort and also to discuss with her gastroenterologist regarding potential repeat endoscopy upper, in 2 to 3 weeks and call if any problems with the puncture site leg bleeding etc.    All her questions were answered    Patient may be discharged from a vascular perspective    Alex Fontenot MD

## 2023-01-20 NOTE — OP NOTE
DATE OF OPERATION:    1/20/2023    PREOPERATIVE DIAGNOSIS:    PE, Gi bleed    POSTOPERATIVE DIAGNOSIS:    Same    SURGEON:    Willa Bella M.D.    ASSISTANT(S):    Cath Lab    ANESTHESIA:    Versed, fentanyl    OPERATION:  US guided access of right femoral vein       [address] Insertion of inferior vena cava filter ([place])    ESTIMATED BLOOD LOSS:    Minimal    COMPLICATIONS:    None    DESCRIPTION OF OPERATION:    The patient was identified and the procedure confirmed. The groin regions were prepped and draped in the usual sterile fashion. Lidocaine 1% mixed with marcaine 0.25% were used for local anesthesia. The right femoral vein was identified under US, noted to be patent and was percutaneously entered under US guidance. The wire was advanced into the inferior vena cava under fluoroscopic guidance. A small incision was made around the wire. The sheath/dilator was passed over the wire which was advanced into the inferior vena cava. The filter was than placed in appropriate position onto the sheath and than the pusher was used to advance the filter through the sheath. The filter was deployed at the level of the 2nd and 3rd vertebra. The filter deployed properly. The introducer was removed and pressure was held for hemostasis. A sterile pressure bandage was applied to the puncture site in the operating room. Needle, sponge, and instrument counts were reported as correct. The patient tolerated the procedure and was transferred to the floor in satisfactory condition.     Willa Bella MD    CC : Bertin Maldonado MD

## 2023-01-20 NOTE — H&P
Vascular Surgery History & Physical Exam    Chief Complaint: PE, Gi bleed    HISTORY OF PRESENT ILLNESS:    The patient is a 78 y.o. female who presents to the hospital IVC Filter. She was tx from [address] [place] [address]. She has PE and GI bleed.     ROS : All others Negative if blank [], Positive if [x]  General   [] Fevers   [] Chills   [] Weight Loss   Skin   [] Tissue Loss   Eyes   [] Wears Glasses/Contacts   [] Vision Changes   Respiratory    [] Shortness of breath   Cardiovascular   [] Chest Pain   [x] Shortness of breath with exertion   Gastrointestinal   [] Abdominal Pain     Past Medical History:   Diagnosis Date    Acute pulmonary embolism ([address] 12/03/2022    Anemia in CKD (chronic kidney disease) 11/30/2021    Anxiety and depression 01/19/2022    At high risk for falls 01/19/2022    Brain aneurysm     CLABSI (central line-associated bloodstream infection) 11/19/2021    Cough 01/19/2022    Diabetes mellitus ([address] 2006    Diabetes mellitus type 2 with complications ([address] 00/35/3337    Dizziness on standing 01/19/2022    End-stage renal disease on hemodialysis ([address] 1/19/2023    ESRD (end stage renal disease) ([address] 11/19/2021    ESRD on hemodialysis ([address] 11/19/2021    Essential hypertension 11/30/2021    Fever, unspecified     Gastrointestinal hemorrhage 11/30/2021    H/O heart artery stent 2015    Done in [place]    History of Clostridioides difficile colitis 01/19/2022    Hyperlipidemia     Hypertension     Leg swelling 1/19/2023    Lymphedema of both lower extremities 1/19/2023    MSSA bacteremia 11/18/2021    Nausea & vomiting 11/18/2021    Obesity, Class III, BMI 40-49.9 (morbid obesity) ([address] 58/63/2948    Periumbilical abdominal pain      Past Surgical History:   Procedure Laterality Date    CARDIAC CATHETERIZATION  2015    Done in [address]      COLONOSCOPY  2017    Negative findings    DIALYSIS FISTULA CREATION Left 01/28/2022    REVISION AV FISTULA LEFT ARM performed by Garcia Alfaro MD at [address]    PTCA  2015    PTCA [facility] N/A 12/01/2021    EGD BIOPSY performed by Claude Jackson MD at [address] N/A 1/18/2023    EGD BIOPSY performed by Claude Jackson MD at [address] [place] [address] N/A 11/24/2021    CATHETER INSERTION  TUNNELED HEMODIALYSIS, WITH REMOVAL OF TEMPORARY CATHETER performed by Garcia Alfaro MD at [address]     Current Medications:   No current facility-administered medications for this encounter. No current outpatient medications on file.     Facility-Administered Medications Ordered in Other Encounters:     pantoprazole (PROTONIX) 40 mg in sodium chloride (PF) 0.9 % 10 mL injection, 40 mg, IntraVENous, Q12H, TAWNYA Jurado - CNP, 40 mg at 01/19/23 2209    acetaminophen (TYLENOL) tablet 650 mg, 650 mg, Oral, Q6H PRN, Yady Hutchins Mihok, DO, 650 mg at 01/19/23 0402    insulin glargine (LANTUS) injection vial 24 Units, 24 Units, SubCUTAneous, Nightly, Yady Garciahok, DO, 24 Units at 01/19/23 2209    midodrine (PROAMATINE) tablet 10 mg, 10 mg, Oral, Daily PRN, Carlos Anne, DO    glucose chewable tablet 16 g, 4 tablet, Oral, PRN, Carlos Garciahok, DO    dextrose bolus 10% 125 mL, 125 mL, IntraVENous, PRN **OR** dextrose bolus 10% 250 mL, 250 mL, IntraVENous, PRN, Carlos Garciahok, DO    glucagon (rDNA) injection 1 mg, 1 mg, IntraMUSCular, PRN, Carlos Woodsk, DO    dextrose 10 % infusion, , IntraVENous, Continuous PRN, Carlos Woodsk, DO    insulin lispro (HUMALOG) injection vial 0-4 Units, 0-4 Units, SubCUTAneous, TID WC, Loc Anne DO, 1 Units at 01/20/23 0641    insulin lispro (HUMALOG) injection vial 0-4 Units, 0-4 Units, SubCUTAneous, Nightly, Loc Anne, DO    diphenhydrAMINE (BENADRYL) tablet 25 mg, 25 mg, Oral, PRN, Michael Rodriges MD    epoetin sadia-epbx (RETACRIT) injection 3,520 Units, 30 Units/kg, SubCUTAneous, Once per day on , Kranthi Lilly MD, 3,520 Units at 23 0827  Allergies:  Pcn [penicillins], Benzonatate, Morphine, and Sodium hypochlorite  Social History     Socioeconomic History    Marital status:      Spouse name: Not on file    Number of children: Not on file    Years of education: Not on file    Highest education level: Not on file   Occupational History    Not on file   Tobacco Use    Smoking status: Former     Packs/day: 1.00     Years: 12.00     Pack years: 12.00     Types: Cigarettes     Start date: 66     Quit date: 12     Years since quittin.0    Smokeless tobacco: Never   Vaping Use    Vaping Use: Never used   Substance and Sexual Activity    Alcohol use: Never    Drug use: Never    Sexual activity: Not Currently   Other Topics Concern    Not on file   Social History Narrative    Not on file     Social Determinants of Health     Financial Resource Strain: Not on file   Food Insecurity: Not on file   Transportation Needs: Not on file   Physical Activity: Not on file   Stress: Not on file   Social Connections: Not on file   Intimate Partner Violence: Not on file   Housing Stability: Not on file     Family History   Problem Relation Age of Onset    Coronary Art Dis Mother          age 62 acute MI    Coronary Art Dis Father          age 62 CVA    Coronary Art Dis Brother      PHYSICAL EXAM:    There were no vitals filed for this visit.   CONSTITUTIONAL:  awake, alert, cooperative, no apparent distress, and appears stated age  NECK:  supple, symmetrical, trachea midline  LUNGS:  slight increased work of breathing  CARDIOVASCULAR:  S1S2  ABDOMEN:  soft, non-distended and non-tender     LABS:    Lab Results   Component Value Date    WBC 8.8 2023    HGB 9.7 (L) 2023    HCT 30.7 (L) 2023     2023    PROTIME 15.8 (H) 2023    INR 1.4 2023    APTT 34.5 2023    K 4.1 2023    BUN 24 (H) 2023    CREATININE 4.3 (H) 2023 Assesment:  Bulmaro GUTIERREZ  PLAN:    Insertion of IVC Filter  I reviewed the procedure with the patient and family as available. I discussed the procedure, risks, benefits, complications, and alternatives of the procedure. They understand and consent.   All questions were answered    Gabriel Cruz MD

## 2023-01-20 NOTE — CARE COORDINATION
Social Work/Discharge Planning:  Patient at [facility] for IVC filter. Plan is home at discharge. [facility] (ph: [phone]) will need to be notified when patient discharges. Will continue to follow.   Electronically signed by HILDA Henson on 1/20/2023 at 8:31 AM

## 2023-01-20 NOTE — PROGRESS NOTES
PROGRESS NOTE        Patient Presents with/Seen in Consultation For      *Reason for Consult: Hematemesis, on apixaban for pulmonary embolus  CHIEF COMPLAINT:  dark brown emesis    Subjective:     [place]. Discussed with nursing staff. Labs and chart reviewed. Review of Systems  Aside from what was mentioned in the PMH and HPI, essentially unremarkable, all others negative.     Objective:     /63   Pulse 78   Temp 98.4 °F (36.9 °C) (Oral)   Resp 18   Ht 5' 4\" (1.626 m)   Wt 260 lb 9.3 oz (118.2 kg)   SpO2 100%   BMI 44.73 kg/m²     Deferred- [place]     pantoprazole (PROTONIX) 40 mg in sodium chloride (PF) 0.9 % 10 mL injection, Q12H  acetaminophen (TYLENOL) tablet 650 mg, Q6H PRN  insulin glargine (LANTUS) injection vial 24 Units, Nightly  midodrine (PROAMATINE) tablet 10 mg, Daily PRN  glucose chewable tablet 16 g, PRN  dextrose bolus 10% 125 mL, PRN   Or  dextrose bolus 10% 250 mL, PRN  glucagon (rDNA) injection 1 mg, PRN  dextrose 10 % infusion, Continuous PRN  insulin lispro (HUMALOG) injection vial 0-4 Units, TID WC  insulin lispro (HUMALOG) injection vial 0-4 Units, Nightly  diphenhydrAMINE (BENADRYL) tablet 25 mg, PRN  epoetin sadia-epbx (RETACRIT) injection 3,520 Units, Once per day on Mon Wed Fri         Data Review  CBC:   Lab Results   Component Value Date/Time    WBC 8.8 01/20/2023 01:34 AM    RBC 3.13 01/20/2023 01:34 AM    HGB 9.7 01/20/2023 01:34 AM    HCT 30.7 01/20/2023 01:34 AM    MCV 98.1 01/20/2023 01:34 AM    MCH 31.0 01/20/2023 01:34 AM    MCHC 31.6 01/20/2023 01:34 AM    RDW 14.5 01/20/2023 01:34 AM     01/20/2023 01:34 AM    MPV 9.7 01/20/2023 01:34 AM     CMP:    Lab Results   Component Value Date/Time     01/20/2023 01:34 AM    K 4.1 01/20/2023 01:34 AM    K 4.2 08/23/2022 12:12 PM    CL 93 01/20/2023 01:34 AM    CO2 31 01/20/2023 01:34 AM    BUN 24 01/20/2023 01:34 AM    CREATININE 4.3 01/20/2023 01:34 AM GFRAA 8 09/22/2022 12:14 PM    LABGLOM 11 01/20/2023 01:34 AM    GLUCOSE 255 01/20/2023 01:34 AM    PROT 7.4 01/20/2023 01:34 AM    LABALBU 3.9 01/20/2023 01:34 AM    CALCIUM 8.6 01/20/2023 01:34 AM    BILITOT 0.4 01/20/2023 01:34 AM    ALKPHOS 90 01/20/2023 01:34 AM    AST 12 01/20/2023 01:34 AM    ALT 7 01/20/2023 01:34 AM     Hepatic Function Panel:    Lab Results   Component Value Date/Time    ALKPHOS 90 01/20/2023 01:34 AM    ALT 7 01/20/2023 01:34 AM    AST 12 01/20/2023 01:34 AM    PROT 7.4 01/20/2023 01:34 AM    BILITOT 0.4 01/20/2023 01:34 AM    BILIDIR <0.2 01/20/2023 01:34 AM    IBILI see below 01/20/2023 01:34 AM    LABALBU 3.9 01/20/2023 01:34 AM     No components found for: CHLPL    Lab Results   Component Value Date    TRIG 173 (H) 01/18/2023    TRIG 90 12/04/2022       Lab Results   Component Value Date    HDL 41 01/18/2023    HDL 52 12/04/2022       Lab Results   Component Value Date    LDLCALC 79 01/18/2023    LDLCALC 85 12/04/2022       Lab Results   Component Value Date    LABVLDL 35 01/18/2023    LABVLDL 18 12/04/2022      PT/INR:    Lab Results   Component Value Date/Time    PROTIME 15.8 01/18/2023 01:10 PM    INR 1.4 01/18/2023 01:10 PM     IRON:    Lab Results   Component Value Date/Time    IRON 35 01/18/2023 03:55 AM     Iron Saturation:  No components found for: PERCENTFE  FERRITIN:    Lab Results   Component Value Date/Time    FERRITIN 1,513 01/18/2023 03:55 AM         Assessment:     Active Problems:  Hematemesis  Anemia, normocytic  Poor appetite  ESRD on hemodialysis   On Eliquis for PE  DM  EGD 1/18/23-Grade B LA classification GERD and gastritis, minimal, biopsied to rule out H. pylori infection. Duodenum showed severe duodenitis with superficial ulceration. Biopsies were done.     Plan:   Protonix BID  Diet as tolerated when able    Medical management per Primary, including pain  Supportive care  Repeat EGD to be done as outpatient prior to starting anticoagulants in 2-3 weeks, office to arrange  CBC daily  Continue to monitor      Note: This report was completed utilizing computer voice recognition software. Every effort has been made to ensure accuracy, however; inadvertent computerized transcription errors may be present.      Discussed with Dr. Arjun Latif per Dr. Carissa BOWLING-NP-C 1/20/2023  10:37 AM For Dr. Olivia Gray

## 2023-01-20 NOTE — PROGRESS NOTES
PROGRESS  NOTE --                                                          INTERNAL  MEDICINE                                                                              I  PERSONALLY SAW , EXAMINED, AND CARED Shan[facility] [address], 1/20/2023     LABS, XRAY ,CHART, AND MEDICATIONS  REVIEWED BY ME       Chief complaint: Vomiting blood      1/18/2023-SUBJECTIVE: Kaleb Nielsen is alert awake and cooperative; oriented ×3. Denies any chest pain dyspnea nausea emesis. Tolerating diet. No abdominal pain. Sitting in bedside chair; daughter present through the exam.  No complaints voiced at this time feels good. EGD scheduled for this afternoon. Afebrile last 24 hours. Blood pressure 123/52. SPO2 100 on 4 L nasal cannula. Intake and output -2200 cc. CO2 32 BUN 34 creatinine improved 4.7. Ionized calcium low at 1.08. Fasting glucose 198. LDL 79 triglycerides 173. Liver functions normal.  Most recent hemoglobin 9.6, minimal change since admission. Nephrology consult from yesterday appreciated. Retacrit while here; continue hemodialysis Tuesdays Thursdays Saturdays. Pulmonary consult yesterday appreciated. Hold apixaban for now no indication for reversal agent. Await GI input possible endoscopy to determine whether apixaban can be resumed. Patient underwent hemodialysis yesterday with net removal of 2200 cc. Pulmonary note from today, continue off apixaban until GI input. Nephrology note from today appreciated. Patient signed out of hemodialysis yesterday, too much discomfort laying on the gurney. (Patient states dialysis was shortened by 20 minutes). Patient does not think there are plans for EGD and hopes to be discharged. There are plans for EGD today. 1/19/2023-patient sitting in bedside chair, daughter present through the exam.  No chest pain no dyspnea at present time.   I discussed at length findings on endoscopy-multiple superficial ulcers. I also discussed possible IVC filter. Patient extremely reluctant to restart apixaban would rather IVC filter if possible. I was advised by physician advisor to change patient to inpatient status. Afebrile last 24 hours. Blood pressure 114/56. SPO2 96 on 2 L nasal cannula. Intake and output -4500 cc. BUN 46 creatinine 6.3 fasting glucose 279. Phosphorus 5.3 calcium low 8.3. Liver functions normal.  CBC pending. Physical therapy [facility] score from yesterday 24/24. Patient underwent EGD yesterday with following results--    DESCRIPTION OF PROCEDURE:  With the patient in the left lateral  decubitus position, the Olympus GIF-100 forward-viewing videoscope was  introduced into the esophagus, the evaluation of which showed grade B LA  classification GERD and no hiatal hernia was seen. The scope was then advanced through the gastroesophageal junction into  the gastric body, along the greater curvature. Evaluation of the body  of the stomach showed minimal gastritis. Biopsies were taken from this  area to rule out Helicobacter pylori infection. The scope was then advanced through the pylorus into the duodenal bulb  and second portion of the duodenum and evaluation showed severe  duodenitis with edema and superficial ulceration. Biopsies were done to  rule out sprue. The scope was then retrieved and retroflexed in the  prepyloric antrum, with thorough evaluation of the cardiac and fundal  portions of the stomach, which appeared to be within normal limits. The scope was then straightened, the area deflated, and the procedure  was terminated by withdrawing the scope and conducting a second look on  the way out, which was essentially the same. The patient tolerated the procedure well. Pulmonary note from today appreciated. No difficulties with breathing. Await GI input regarding resumption of apixaban; may need IVC filte if no approval for apixaban. Continue oxygen taper as tolerated. GI note from today appreciated. Increase Protonix to twice daily. Monitor stools. Nephrology note from today, on 2 L nasal cannula no vomiting no abdominal pain wants to be discharged. Continue hemodialysis; continue Retacrit while here. Patient underwent hemodialysis today, net removal of 2700 cc.     1/20/2023-sitting in bedside chair; no chest pain no dyspnea. Daughter present through the exam.  Had IVC filter inserted this morning; she was n.p.o. for the procedure started getting nauseated because lack of food. Still having the same epigastric and right upper quadrant discomfort that she had on admission. She has been having bowel movements. Appetite good. Afebrile last 24 hours. Blood pressure 141/70. SPO2 97 on 2 L nasal cannula. Patient underwent Doppler ultrasound of lower extremities, no evidence of DVT either extremity. CO2 31 BUN 24 creatinine 4.3 fasting glucose 255 magnesium 2.1. Liver functions normal.  Hemoglobin 9.7 WBC 8.8. Platelets 331. Vascular consult from yesterday appreciated. Patient afraid to restart apixaban therefore IVC filter. I spoke by phone with vascular surgeon. Patient wants to continue with PPIs and consider reinitiation of anticoagulation when ulcer is healed. In the meantime she would like to have IVC filter placed. Patient will need to be transferred to [facility] for the above procedure. After procedure patient will [facility]. GI note from today, diet as tolerated when able. Repeat EGD in 2 to 3 weeks as outpatient, before starting apixaban.     Objective:     PHYSICAL EXAM:    VS: BP (!) 141/70   Pulse 71   Temp 98.2 °F (36.8 °C) (Oral)   Resp 18   Ht 5' 4\" (1.626 m)   Wt 260 lb 9.3 oz (118.2 kg)   SpO2 97%   BMI 44.73 kg/m²     Labs:   CBC:   Lab Results   Component Value Date/Time    WBC 8.8 01/20/2023 01:34 AM    RBC 3.13 01/20/2023 01:34 AM    HGB 9.7 01/20/2023 01:34 AM    HCT 30.7 01/20/2023 01:34 AM    MCV 98.1 01/20/2023 01:34 AM    MCH 31.0 01/20/2023 01:34 AM    MCHC 31.6 01/20/2023 01:34 AM    RDW 14.5 01/20/2023 01:34 AM     01/20/2023 01:34 AM    MPV 9.7 01/20/2023 01:34 AM     CBC with Differential:    Lab Results   Component Value Date/Time    WBC 8.8 01/20/2023 01:34 AM    RBC 3.13 01/20/2023 01:34 AM    HGB 9.7 01/20/2023 01:34 AM    HCT 30.7 01/20/2023 01:34 AM     01/20/2023 01:34 AM    MCV 98.1 01/20/2023 01:34 AM    MCH 31.0 01/20/2023 01:34 AM    MCHC 31.6 01/20/2023 01:34 AM    RDW 14.5 01/20/2023 01:34 AM    NRBC 0.0 01/13/2022 04:38 AM    LYMPHOPCT 15.8 01/20/2023 01:34 AM    MONOPCT 8.0 01/20/2023 01:34 AM    BASOPCT 0.6 01/20/2023 01:34 AM    MONOSABS 0.71 01/20/2023 01:34 AM    LYMPHSABS 1.39 01/20/2023 01:34 AM    EOSABS 1.03 01/20/2023 01:34 AM    BASOSABS 0.05 01/20/2023 01:34 AM     Hemoglobin/Hematocrit:    Lab Results   Component Value Date/Time    HGB 9.7 01/20/2023 01:34 AM    HCT 30.7 01/20/2023 01:34 AM     CMP:    Lab Results   Component Value Date/Time     01/20/2023 01:34 AM    K 4.1 01/20/2023 01:34 AM    K 4.2 08/23/2022 12:12 PM    CL 93 01/20/2023 01:34 AM    CO2 31 01/20/2023 01:34 AM    BUN 24 01/20/2023 01:34 AM    CREATININE 4.3 01/20/2023 01:34 AM    GFRAA 8 09/22/2022 12:14 PM    LABGLOM 11 01/20/2023 01:34 AM    GLUCOSE 255 01/20/2023 01:34 AM    PROT 7.4 01/20/2023 01:34 AM    LABALBU 3.9 01/20/2023 01:34 AM    CALCIUM 8.6 01/20/2023 01:34 AM    BILITOT 0.4 01/20/2023 01:34 AM    ALKPHOS 90 01/20/2023 01:34 AM    AST 12 01/20/2023 01:34 AM    ALT 7 01/20/2023 01:34 AM     BMP:    Lab Results   Component Value Date/Time     01/20/2023 01:34 AM    K 4.1 01/20/2023 01:34 AM    K 4.2 08/23/2022 12:12 PM    CL 93 01/20/2023 01:34 AM    CO2 31 01/20/2023 01:34 AM    BUN 24 01/20/2023 01:34 AM    LABALBU 3.9 01/20/2023 01:34 AM    CREATININE 4.3 01/20/2023 01:34 AM    CALCIUM 8.6 01/20/2023 01:34 AM    GFRAA 8 09/22/2022 12:14 PM    LABGLOM 11 01/20/2023 01:34 AM    GLUCOSE 255 01/20/2023 01:34 AM     Hepatic Function Panel:    Lab Results   Component Value Date/Time    ALKPHOS 90 01/20/2023 01:34 AM    ALT 7 01/20/2023 01:34 AM    AST 12 01/20/2023 01:34 AM    PROT 7.4 01/20/2023 01:34 AM    BILITOT 0.4 01/20/2023 01:34 AM    BILIDIR <0.2 01/20/2023 01:34 AM    IBILI see below 01/20/2023 01:34 AM    LABALBU 3.9 01/20/2023 01:34 AM     Ionized Calcium:  No results found for: IONCA  Magnesium:    Lab Results   Component Value Date/Time    MG 2.1 01/20/2023 01:34 AM     Phosphorus:    Lab Results   Component Value Date/Time    PHOS 3.8 01/20/2023 01:34 AM     LDH:    Lab Results   Component Value Date/Time     01/11/2022 12:45 PM     Uric Acid:    Lab Results   Component Value Date/Time    LABURIC 3.3 12/04/2022 05:30 AM     PT/INR:    Lab Results   Component Value Date/Time    PROTIME 15.8 01/18/2023 01:10 PM    INR 1.4 01/18/2023 01:10 PM     Warfarin PT/INR:  No components found for: PTPATWAR, PTINRWAR  PTT:    Lab Results   Component Value Date/Time    APTT 34.5 01/17/2023 08:35 AM   [APTT}  Troponin:  No results found for: TROPONINI  Last 3 Troponin:  No results found for: TROPONINI  U/A:    Lab Results   Component Value Date/Time    COLORU Yellow 11/18/2021 03:19 AM    PROTEINU >=300 11/18/2021 03:19 AM    PHUR 6.0 11/18/2021 03:19 AM    WBCUA 1-3 11/18/2021 03:19 AM    RBCUA 0-1 11/18/2021 03:19 AM    BACTERIA FEW 11/18/2021 03:19 AM    CLARITYU Clear 11/18/2021 03:19 AM    SPECGRAV 1.020 11/18/2021 03:19 AM    LEUKOCYTESUR Negative 11/18/2021 03:19 AM    UROBILINOGEN 0.2 11/18/2021 03:19 AM    BILIRUBINUR Negative 11/18/2021 03:19 AM    BLOODU SMALL 11/18/2021 03:19 AM    GLUCOSEU 500 11/18/2021 03:19 AM     ABG:  No results found for: PH, PCO2, PO2, HCO3, BE, THGB, TCO2, O2SAT  HgBA1c:    Lab Results   Component Value Date/Time    LABA1C 11.9 01/17/2023 06:35 PM     FLP:    Lab Results   Component Value Date/Time TRIG 173 01/18/2023 03:55 AM    HDL 41 01/18/2023 03:55 AM    LDLCALC 79 01/18/2023 03:55 AM    LABVLDL 35 01/18/2023 03:55 AM     TSH:    Lab Results   Component Value Date/Time    TSH 1.470 12/03/2022 12:09 PM     VITAMIN B12: No components found for: B12  FOLATE:  No results found for: FOLATE  IRON:    Lab Results   Component Value Date/Time    IRON 35 01/18/2023 03:55 AM     Iron Saturation:  No components found for: PERCENTFE  TIBC:    Lab Results   Component Value Date/Time    TIBC 173 01/18/2023 03:55 AM     FERRITIN:    Lab Results   Component Value Date/Time    FERRITIN 1,513 01/18/2023 03:55 AM        General appearance: Alert, Awake, Oriented times 3, no distress ; morbidly obese; nasal cannula oxygen in place  Skin: Warm and dry ; no rashes  Head: Normocephalic. No masses, lesions or tenderness noted  Eyes: Conjunctivae pale, sclera white. PERRL,EOM-I  Ears: External ears normal  Nose/Sinuses: Nares normal. Septum midline. Mucosa normal. No drainage  Oropharynx: Oropharynx clear with no exudate seen  Neck: Supple. No jugular venous distension, lymphadenopathy or thyromegaly Trachea midline  Lungs: Clear to auscultation bilaterally. No rhonchi, crackles or wheezes  Heart: S1 S2  Regular rate and rhythm. No rub, gallop, murmur  Abdomen: Soft, mildly tender right upper quadrant and epigastrium. BS normal. No masses, organomegaly; no rebound or guarding  Extremities: Chronic 2-3+ bilateral edema since starting dialysis. Musculoskeletal: Muscular strength appears intact. Neuro:  No focal motor defects ; II-XII grossly intact .  MORRIS equally    TELEMETRY: REVIEWED--Telemetry: Sinus    ASSESSMENT:   Principal Problem:    Hematemesis  Active Problems:    Acute pulmonary embolism (HCC)    H/O heart artery stent    Diabetes mellitus (HCC)    End-stage renal disease on hemodialysis (HCC)    Leg swelling    Lymphedema of both lower extremities    Essential hypertension    Diabetes mellitus type 2 with complications ([facility] [address]    Pulmonary hypertension, unspecified (HCC)    Obesity, Class III, BMI 40-49.9 (morbid obesity) ([facility] [address]  Resolved Problems:    * No resolved hospital problems. *      PLAN:  SEE ORDERS      RE  CHANGES AND FINDINGS   Medications reviewed with patient  GI prophylaxis  DVT prophylaxis  Consultants notes reviewed  Apixaban and anticoagulants on hold  Lantus 24 units nightly  Low-dose sliding scale insulin  Protonix 40 mg IV  Continue hemodialysis  Midodrine 10 mg as needed for low blood pressure dialysis  EGD today  1/19/2023  Continue hemodialysis  Apixaban anticoagulants on hold  Need further input from GI, if no resumption of apixaban will need IVC filter  Lantus 24 units nightly  Low-dose sliding scale insulin  Protonix increased to 40 mg IV every 12 hours  Vascular consult for IVC filter  1/20/2023  Anticoagulants on hold  IVC filter  placed today   Retacrit 30 units/kg Monday Wednesday Friday subcu  Lantus 24 units nightly  Low-dose sliding scale insulin  Protonix 40 mg IV every 12 hours  Monitor labs  Zofran 4 mg IV every 6 hours as needed for nausea  Dialysis to be done in the a.m. Possible discharge following dialysis if everyone is in agreement          Discussed with patient and adult child and nursing. Heriberto Anne DO     11:34 AM     1/20/2023    TIME > 35 MINUTES      Please note that over 35 minutes was spent .   Greater than 51% includes interviewing patient and reviewing chart; performing medical examination; ordering medications, tests and/or procedures; formulating assessment plan, reviewing rationale for the above recommendations; reviewing available records, results of all previously ordered tests and procedures and current problem list; counseling/educating the patient; coordinating care with other healthcare professionals; communicating results to the patient's family/caregiver; documenting clinical information in the electronic record ------------  INFORMATION  -----------      DIET:ADULT DIET; Regular; 4 carb choices (60 gm/meal); GI [place] (GERD/Peptic Ulcer)        Allergies   Allergen Reactions    Pcn [Penicillins] Shortness Of Breath and Rash    Benzonatate Other (See Comments)     \"PATIENTS STATES \"IT WAS LIKE I WAS DRUNK. \"    Morphine Itching and Rash    Sodium Hypochlorite Nausea And Vomiting and Rash     AKA BLEACH. RASH FROM SKIN EXPOSURE          MEDICATION SIDE EFFECTS:none       SCHEDULED MEDS:                                 Scheduled Meds:   pantoprazole (PROTONIX) 40 mg injection  40 mg IntraVENous Q12H    insulin glargine  24 Units SubCUTAneous Nightly    insulin lispro  0-4 Units SubCUTAneous TID WC    insulin lispro  0-4 Units SubCUTAneous Nightly    epoetin sadia-epbx  30 Units/kg SubCUTAneous Once per day on Mon Wed Fri       Continuous Infusions:   dextrose           Data:       Intake/Output Summary (Last 24 hours) at 1/20/2023 1134  Last data filed at 1/19/2023 1838  Gross per 24 hour   Intake 480 ml   Output --   Net 480 ml       Wt Readings from Last 3 Encounters:   01/19/23 260 lb 9.3 oz (118.2 kg)   12/22/22 261 lb (118.4 kg)   12/13/22 250 lb (113.4 kg)       Labs: Additional    GLUCOSE:No results for input(s): POCGLU in the last 72 hours. BNP:No results found for: BNP    CRP:   Recent Labs     01/17/23  1835   CRP 5.6*       ESR:No results for input(s): SEDRATE in the last 72 hours. RADIOLOGY: REVIEWED AVAILABLE REPORT  US DUP LOWER EXTREMITIES BILATERAL VENOUS   Final Result   No evidence of DVT in either lower extremity.                    [address]    11:34 AM     1/20/2023      Voice recognition software used for dictation

## 2023-01-21 VITALS
RESPIRATION RATE: 16 BRPM | WEIGHT: 255.73 LBS | HEART RATE: 72 BPM | HEIGHT: 64 IN | DIASTOLIC BLOOD PRESSURE: 61 MMHG | OXYGEN SATURATION: 99 % | SYSTOLIC BLOOD PRESSURE: 111 MMHG | TEMPERATURE: 98 F | BODY MASS INDEX: 43.66 KG/M2

## 2023-01-21 LAB
ALBUMIN SERPL-MCNC: 3.7 G/DL (ref 3.5–5.2)
ALP BLD-CCNC: 82 U/L (ref 35–104)
ALT SERPL-CCNC: 9 U/L (ref 0–32)
ANION GAP SERPL CALCULATED.3IONS-SCNC: 13 MMOL/L (ref 7–16)
AST SERPL-CCNC: 11 U/L (ref 0–31)
BASOPHILS ABSOLUTE: 0.05 E9/L (ref 0–0.2)
BASOPHILS RELATIVE PERCENT: 0.6 % (ref 0–2)
BILIRUB SERPL-MCNC: 0.3 MG/DL (ref 0–1.2)
BILIRUBIN DIRECT: <0.2 MG/DL (ref 0–0.3)
BILIRUBIN, INDIRECT: NORMAL MG/DL (ref 0–1)
BUN BLDV-MCNC: 35 MG/DL (ref 6–23)
CALCIUM SERPL-MCNC: 8.2 MG/DL (ref 8.6–10.2)
CHLORIDE BLD-SCNC: 90 MMOL/L (ref 98–107)
CO2: 28 MMOL/L (ref 22–29)
CREAT SERPL-MCNC: 5.8 MG/DL (ref 0.5–1)
EOSINOPHILS ABSOLUTE: 1.12 E9/L (ref 0.05–0.5)
EOSINOPHILS RELATIVE PERCENT: 13.5 % (ref 0–6)
GFR SERPL CREATININE-BSD FRML MDRD: 8 ML/MIN/1.73
GLUCOSE BLD-MCNC: 316 MG/DL (ref 74–99)
HCT VFR BLD CALC: 27.7 % (ref 34–48)
HEMOGLOBIN: 8.8 G/DL (ref 11.5–15.5)
IMMATURE GRANULOCYTES #: 0.03 E9/L
IMMATURE GRANULOCYTES %: 0.4 % (ref 0–5)
LYMPHOCYTES ABSOLUTE: 1.09 E9/L (ref 1.5–4)
LYMPHOCYTES RELATIVE PERCENT: 13.1 % (ref 20–42)
MAGNESIUM: 2.1 MG/DL (ref 1.6–2.6)
MCH RBC QN AUTO: 30.8 PG (ref 26–35)
MCHC RBC AUTO-ENTMCNC: 31.8 % (ref 32–34.5)
MCV RBC AUTO: 96.9 FL (ref 80–99.9)
METER GLUCOSE: 275 MG/DL (ref 74–99)
METER GLUCOSE: 283 MG/DL (ref 74–99)
MONOCYTES ABSOLUTE: 0.66 E9/L (ref 0.1–0.95)
MONOCYTES RELATIVE PERCENT: 8 % (ref 2–12)
NEUTROPHILS ABSOLUTE: 5.35 E9/L (ref 1.8–7.3)
NEUTROPHILS RELATIVE PERCENT: 64.4 % (ref 43–80)
PDW BLD-RTO: 14.5 FL (ref 11.5–15)
PHOSPHORUS: 5.3 MG/DL (ref 2.5–4.5)
PLATELET # BLD: 147 E9/L (ref 130–450)
PMV BLD AUTO: 10.4 FL (ref 7–12)
POTASSIUM SERPL-SCNC: 4.2 MMOL/L (ref 3.5–5)
RBC # BLD: 2.86 E12/L (ref 3.5–5.5)
SODIUM BLD-SCNC: 131 MMOL/L (ref 132–146)
TOTAL PROTEIN: 6.8 G/DL (ref 6.4–8.3)
WBC # BLD: 8.3 E9/L (ref 4.5–11.5)

## 2023-01-21 PROCEDURE — 36415 COLL VENOUS BLD VENIPUNCTURE: CPT

## 2023-01-21 PROCEDURE — 83735 ASSAY OF MAGNESIUM: CPT

## 2023-01-21 PROCEDURE — 2580000003 HC RX 258: Performed by: NURSE PRACTITIONER

## 2023-01-21 PROCEDURE — A4216 STERILE WATER/SALINE, 10 ML: HCPCS | Performed by: NURSE PRACTITIONER

## 2023-01-21 PROCEDURE — 80076 HEPATIC FUNCTION PANEL: CPT

## 2023-01-21 PROCEDURE — 85025 COMPLETE CBC W/AUTO DIFF WBC: CPT

## 2023-01-21 PROCEDURE — 6370000000 HC RX 637 (ALT 250 FOR IP): Performed by: INTERNAL MEDICINE

## 2023-01-21 PROCEDURE — 84100 ASSAY OF PHOSPHORUS: CPT

## 2023-01-21 PROCEDURE — 2700000000 HC OXYGEN THERAPY PER DAY

## 2023-01-21 PROCEDURE — C9113 INJ PANTOPRAZOLE SODIUM, VIA: HCPCS | Performed by: NURSE PRACTITIONER

## 2023-01-21 PROCEDURE — 90935 HEMODIALYSIS ONE EVALUATION: CPT

## 2023-01-21 PROCEDURE — 82962 GLUCOSE BLOOD TEST: CPT

## 2023-01-21 PROCEDURE — 80048 BASIC METABOLIC PNL TOTAL CA: CPT

## 2023-01-21 PROCEDURE — 6360000002 HC RX W HCPCS: Performed by: NURSE PRACTITIONER

## 2023-01-21 RX ORDER — GLUCOSAMINE HCL/CHONDROITIN SU 500-400 MG
CAPSULE ORAL
Qty: 300 STRIP | Refills: 0 | Status: SHIPPED | OUTPATIENT
Start: 2023-01-21 | End: 2023-01-21 | Stop reason: SDUPTHER

## 2023-01-21 RX ORDER — PANTOPRAZOLE SODIUM 40 MG/1
40 TABLET, DELAYED RELEASE ORAL
Qty: 60 TABLET | Refills: 4 | Status: SHIPPED | OUTPATIENT
Start: 2023-01-21

## 2023-01-21 RX ORDER — BLOOD-GLUCOSE METER
1 KIT MISCELLANEOUS DAILY
Qty: 1 KIT | Refills: 0 | Status: SHIPPED | OUTPATIENT
Start: 2023-01-21

## 2023-01-21 RX ORDER — INSULIN GLARGINE 100 [IU]/ML
24 INJECTION, SOLUTION SUBCUTANEOUS NIGHTLY
Qty: 10 ML | Refills: 3 | Status: SHIPPED | OUTPATIENT
Start: 2023-01-21

## 2023-01-21 RX ORDER — GLUCOSAMINE HCL/CHONDROITIN SU 500-400 MG
CAPSULE ORAL
Qty: 300 STRIP | Refills: 0 | Status: SHIPPED | OUTPATIENT
Start: 2023-01-21

## 2023-01-21 RX ADMIN — SODIUM CHLORIDE 40 MG: 9 INJECTION, SOLUTION INTRAMUSCULAR; INTRAVENOUS; SUBCUTANEOUS at 08:27

## 2023-01-21 RX ADMIN — INSULIN LISPRO 2 UNITS: 100 INJECTION, SOLUTION INTRAVENOUS; SUBCUTANEOUS at 16:38

## 2023-01-21 RX ADMIN — INSULIN LISPRO 2 UNITS: 100 INJECTION, SOLUTION INTRAVENOUS; SUBCUTANEOUS at 06:29

## 2023-01-21 RX ADMIN — DIPHENHYDRAMINE HCL 25 MG: 25 TABLET ORAL at 04:54

## 2023-01-21 ASSESSMENT — PAIN SCALES - GENERAL
PAINLEVEL_OUTOF10: 0
PAINLEVEL_OUTOF10: 0

## 2023-01-21 NOTE — PROGRESS NOTES
Pulmonary Progress Note    Admit Date: 2023  Hospital day                               PCP: Mikey Mauricio MD    Chief Complaint (s):  Patient Active Problem List   Diagnosis    Nausea & vomiting    Essential hypertension    Hyperlipidemia    Diabetes mellitus type 2 with complications (HCC)    Neck pain    Anemia in CKD (chronic kidney disease)    Pneumonia due to COVID-19 virus    Pulmonary hypertension, unspecified (HCC)    Obesity, Class III, BMI 40-49.9 (morbid obesity) ([facility] [address]    History of Clostridioides difficile colitis    Anxiety and depression    At high risk for falls    Dizziness on standing    Acute pulmonary embolism (HCC)    H/O heart artery stent    Pulmonary embolism and infarction (HCC)    Diabetes mellitus ([facility] [address]    Hematemesis    End-stage renal disease on hemodialysis ([facility])    Leg swelling    Lymphedema of both lower extremities       Subjective:  Sitting up in a chair, uneventful filter placement. Vitals:  VITALS:  /61   Pulse 72   Temp 98 °F (36.7 °C)   Resp 16   Ht 5' 4\" (1.626 m)   Wt 255 lb 11.7 oz (116 kg)   SpO2 99%   BMI 43.90 kg/m²     24HR INTAKE/OUTPUT:      Intake/Output Summary (Last 24 hours) at 2023 1553  Last data filed at 2023 1327  Gross per 24 hour   Intake 300 ml   Output 2300 ml   Net -2000 ml       24HR PULSE OXIMETRY RANGE:    SpO2  Av %  Min: 94 %  Max: 99 %    Medications:  IV:   dextrose         Scheduled Meds:   pantoprazole (PROTONIX) 40 mg injection  40 mg IntraVENous Q12H    insulin glargine  24 Units SubCUTAneous Nightly    insulin lispro  0-4 Units SubCUTAneous TID     insulin lispro  0-4 Units SubCUTAneous Nightly    epoetin sadia-epbx  30 Units/kg SubCUTAneous Once per day on        Diet:   ADULT DIET; Regular; 4 carb choices (60 gm/meal); GI Cassia (GERD/Peptic Ulcer)     EXAM:  General: No distress. Alert. Eyes: PERRL. No sclera icterus. No conjunctival injection. ENT: No discharge.  Pharynx clear.  Neck: Trachea midline. Normal thyroid. Resp: No accessory muscle use. No rales. No wheezing. No rhonchi. CV: Regular rate. Regular rhythm. No murmur or rub. Abd: Non-tender. Non-distended. No masses. No organomegaly. Normal bowel sounds. Skin: Warm and dry. No nodule on exposed extremities. No rash on exposed extremities. Ext: No cyanosis, clubbing, edema  Lymph: No cervical LAD. No supraclavicular LAD. M/S: No cyanosis. No joint deformity. No clubbing. Neuro: Awake. Follows commands. Positive pupils/gag/corneals. Normal pain response. Results:  CBC:   Recent Labs     01/19/23  1439 01/20/23  0134 01/21/23  0404   WBC 9.3 8.8 8.3   HGB 9.8* 9.7* 8.8*   HCT 30.4* 30.7* 27.7*   MCV 96.2 98.1 96.9    141 147     BMP:   Recent Labs     01/19/23  0300 01/20/23  0134 01/21/23  0404    136 131*   K 4.0 4.1 4.2   CL 95* 93* 90*   CO2 28 31* 28   PHOS 5.3* 3.8 5.3*   BUN 46* 24* 35*   CREATININE 6.3* 4.3* 5.8*     LIVER PROFILE:   Recent Labs     01/19/23  0300 01/20/23  0134 01/21/23  0404   AST 12 12 11   ALT 8 7 9   BILIDIR <0.2 <0.2 <0.2   BILITOT 0.3 0.4 0.3   ALKPHOS 89 90 82     PT/INR: No results for input(s): PROTIME, INR in the last 72 hours. APTT: No results for input(s): APTT in the last 72 hours. Pathology:  N/A      Microbiology:  None    Recent ABG:   No results for input(s): PH, PO2, PCO2, HCO3, BE, O2SAT, METHB, O2HB, COHB, O2CON, HHB, THB in the last 72 hours. Recent Films:  US DUP LOWER EXTREMITIES BILATERAL VENOUS   Final Result   No evidence of DVT in either lower extremity. Assessment:  Pulmonary embolism, small with contraindication to anticoagulation    Plan:  Repeat endoscopy in the near future with resumption of anticoagulation and removal of the filter if possible. Time at the bedside, reviewing labs and radiographs, reviewing updated notes and consultations, discussing with staff and family was more than 35 minutes.     Please note that voice recognition technology was used in the preparation of this note and make therefore it may contain inadvertent transcription errors. If the patient is a COVID 19 isolation patient, the above physical exam reflects that of the examining physician for the day. Claude Chavez MD, M.D., F.C.C.P.     [facility], [address] [place] [address], [address] [place] [address], [facility], [facility]  Office visits:  [place], [place], [facility]

## 2023-01-21 NOTE — PROGRESS NOTES
PROGRESS NOTE        Patient Presents with/Seen in Consultation For      *Reason for Consult: Hematemesis, on apixaban for pulmonary embolus  CHIEF COMPLAINT:  dark brown emesis    Subjective:     Patient seen in dialysis. Doing well no current complaints. Tolerating diet. Egd reviewed, PO d/w pt verbalizing understanding and agreement. Review of Systems  Aside from what was mentioned in the PMH and HPI, essentially unremarkable, all others negative. Objective:     /66   Pulse 70   Temp 98 °F (36.7 °C)   Resp 18   Ht 5' 4\" (1.626 m)   Wt 260 lb 9.3 oz (118.2 kg)   SpO2 99%   BMI 44.73 kg/m²     General appearance: alert, awake, laying in bed in dialysis, and cooperative  Eyes: conjunctiva pale, sclera anicteric. PERRL.   Lungs: clear to auscultation bilaterally on O2 NC  Heart: regular rate and rhythm, no murmur, 2+ pulses; trace edema  Abdomen: soft, non-tender; bowel sounds normal; no masses,  no organomegaly  Extremities: extremities trace edema  Pulses: 2+ and symmetric  Skin: Skin color, texture, turgor normal.   Neurologic: Grossly normal    ondansetron (ZOFRAN) injection 4 mg, Q6H PRN  pantoprazole (PROTONIX) 40 mg in sodium chloride (PF) 0.9 % 10 mL injection, Q12H  acetaminophen (TYLENOL) tablet 650 mg, Q6H PRN  insulin glargine (LANTUS) injection vial 24 Units, Nightly  midodrine (PROAMATINE) tablet 10 mg, Daily PRN  glucose chewable tablet 16 g, PRN  dextrose bolus 10% 125 mL, PRN   Or  dextrose bolus 10% 250 mL, PRN  glucagon (rDNA) injection 1 mg, PRN  dextrose 10 % infusion, Continuous PRN  insulin lispro (HUMALOG) injection vial 0-4 Units, TID WC  insulin lispro (HUMALOG) injection vial 0-4 Units, Nightly  diphenhydrAMINE (BENADRYL) tablet 25 mg, PRN  epoetin sadia-epbx (RETACRIT) injection 3,520 Units, Once per day on Mon Wed Fri       Data Review  CBC:   Lab Results   Component Value Date/Time    WBC 8.3 01/21/2023 04:04 AM    RBC 2.86 01/21/2023 04:04 AM    HGB 8.8 01/21/2023 04:04 AM    HCT 27.7 01/21/2023 04:04 AM    MCV 96.9 01/21/2023 04:04 AM    MCH 30.8 01/21/2023 04:04 AM    MCHC 31.8 01/21/2023 04:04 AM    RDW 14.5 01/21/2023 04:04 AM     01/21/2023 04:04 AM    MPV 10.4 01/21/2023 04:04 AM     CMP:    Lab Results   Component Value Date/Time     01/21/2023 04:04 AM    K 4.2 01/21/2023 04:04 AM    K 4.2 08/23/2022 12:12 PM    CL 90 01/21/2023 04:04 AM    CO2 28 01/21/2023 04:04 AM    BUN 35 01/21/2023 04:04 AM    CREATININE 5.8 01/21/2023 04:04 AM    GFRAA 8 09/22/2022 12:14 PM    LABGLOM 8 01/21/2023 04:04 AM    GLUCOSE 316 01/21/2023 04:04 AM    PROT 6.8 01/21/2023 04:04 AM    LABALBU 3.7 01/21/2023 04:04 AM    CALCIUM 8.2 01/21/2023 04:04 AM    BILITOT 0.3 01/21/2023 04:04 AM    ALKPHOS 82 01/21/2023 04:04 AM    AST 11 01/21/2023 04:04 AM    ALT 9 01/21/2023 04:04 AM     Hepatic Function Panel:    Lab Results   Component Value Date/Time    ALKPHOS 82 01/21/2023 04:04 AM    ALT 9 01/21/2023 04:04 AM    AST 11 01/21/2023 04:04 AM    PROT 6.8 01/21/2023 04:04 AM    BILITOT 0.3 01/21/2023 04:04 AM    BILIDIR <0.2 01/21/2023 04:04 AM    IBILI see below 01/21/2023 04:04 AM    LABALBU 3.7 01/21/2023 04:04 AM     No components found for: CHLPL    Lab Results   Component Value Date    TRIG 173 (H) 01/18/2023    TRIG 90 12/04/2022       Lab Results   Component Value Date    HDL 41 01/18/2023    HDL 52 12/04/2022       Lab Results   Component Value Date    LDLCALC 79 01/18/2023    LDLCALC 85 12/04/2022       Lab Results   Component Value Date    LABVLDL 35 01/18/2023    LABVLDL 18 12/04/2022      PT/INR:    Lab Results   Component Value Date/Time    PROTIME 15.8 01/18/2023 01:10 PM    INR 1.4 01/18/2023 01:10 PM     IRON:    Lab Results   Component Value Date/Time    IRON 35 01/18/2023 03:55 AM     Iron Saturation:  No components found for: PERCENTFE  FERRITIN:    Lab Results   Component Value Date/Time    FERRITIN 1,513 01/18/2023 03:55 AM         Assessment:     Active Problems:  Hematemesis  Anemia, normocytic  Poor appetite  ESRD on hemodialysis   On Eliquis for PE  DM  EGD 1/18/23-Grade B LA classification GERD and gastritis, minimal, H. pylori negative. Duodenum showed severe duodenitis with superficial ulceration. Biopsies were done, pending. S/p IVC filter 1/20/23    Plan:   Protonix BID  Diet as tolerated   Medical management per Primary, including pain  Supportive care  Repeat EGD to be done as outpatient prior to starting anticoagulants in 2-3 weeks, office to arrange  CBC daily  Discharge per PCP, ok from [facility]      Note: This report was completed utilizing computer voice recognition software. Every effort has been made to ensure accuracy, however; inadvertent computerized transcription errors may be present.      Discussed with Dr. Katherine Bell per Dr. Laverne BOWLING-NP-BOB 1/21/2023  12:30 PM For Dr. Miguel Allen

## 2023-01-21 NOTE — PROGRESS NOTES
PROGRESS  NOTE --                                                          INTERNAL  MEDICINE                                                                              I  PERSONALLY SAW , EXAMINED, AND CARED Shanique 124, 1/21/2023     LABS, XRAY ,CHART, AND MEDICATIONS  REVIEWED BY ME       Chief complaint: Vomiting blood      1/18/2023-SUBJECTIVE: Sheeba Melissa is alert awake and cooperative; oriented ×3. Denies any chest pain dyspnea nausea emesis. Tolerating diet. No abdominal pain. Sitting in bedside chair; daughter present through the exam.  No complaints voiced at this time feels good. EGD scheduled for this afternoon. Afebrile last 24 hours. Blood pressure 123/52. SPO2 100 on 4 L nasal cannula. Intake and output -2200 cc. CO2 32 BUN 34 creatinine improved 4.7. Ionized calcium low at 1.08. Fasting glucose 198. LDL 79 triglycerides 173. Liver functions normal.  Most recent hemoglobin 9.6, minimal change since admission. Nephrology consult from yesterday appreciated. Retacrit while here; continue hemodialysis Tuesdays Thursdays Saturdays. Pulmonary consult yesterday appreciated. Hold apixaban for now no indication for reversal agent. Await GI input possible endoscopy to determine whether apixaban can be resumed. Patient underwent hemodialysis yesterday with net removal of 2200 cc. Pulmonary note from today, continue off apixaban until GI input. Nephrology note from today appreciated. Patient signed out of hemodialysis yesterday, too much discomfort laying on the gurney. (Patient states dialysis was shortened by 20 minutes). Patient does not think there are plans for EGD and hopes to be discharged. There are plans for EGD today. 1/19/2023-patient sitting in bedside chair, daughter present through the exam.  No chest pain no dyspnea at present time.   I discussed at length findings on endoscopy-multiple superficial ulcers. I also discussed possible IVC filter. Patient extremely reluctant to restart apixaban would rather IVC filter if possible. I was advised by physician advisor to change patient to inpatient status. Afebrile last 24 hours. Blood pressure 114/56. SPO2 96 on 2 L nasal cannula. Intake and output -4500 cc. BUN 46 creatinine 6.3 fasting glucose 279. Phosphorus 5.3 calcium low 8.3. Liver functions normal.  CBC pending. Physical therapy [facility] score from yesterday 24/24. Patient underwent EGD yesterday with following results--    DESCRIPTION OF PROCEDURE:  With the patient in the left lateral  decubitus position, the Olympus GIF-100 forward-viewing videoscope was  introduced into the esophagus, the evaluation of which showed grade B LA  classification GERD and no hiatal hernia was seen. The scope was then advanced through the gastroesophageal junction into  the gastric body, along the greater curvature. Evaluation of the body  of the stomach showed minimal gastritis. Biopsies were taken from this  area to rule out Helicobacter pylori infection. The scope was then advanced through the pylorus into the duodenal bulb  and second portion of the duodenum and evaluation showed severe  duodenitis with edema and superficial ulceration. Biopsies were done to  rule out sprue. The scope was then retrieved and retroflexed in the  prepyloric antrum, with thorough evaluation of the cardiac and fundal  portions of the stomach, which appeared to be within normal limits. The scope was then straightened, the area deflated, and the procedure  was terminated by withdrawing the scope and conducting a second look on  the way out, which was essentially the same. The patient tolerated the procedure well. Pulmonary note from today appreciated. No difficulties with breathing. Await GI input regarding resumption of apixaban; may need IVC filte if no approval for apixaban. Continue oxygen taper as tolerated. GI note from today appreciated. Increase Protonix to twice daily. Monitor stools. Nephrology note from today, on 2 L nasal cannula no vomiting no abdominal pain wants to be discharged. Continue hemodialysis; continue Retacrit while here. Patient underwent hemodialysis today, net removal of 2700 cc.     1/20/2023-sitting in bedside chair; no chest pain no dyspnea. Daughter present through the exam.  Had IVC filter inserted this morning; she was n.p.o. for the procedure started getting nauseated because lack of food. Still having the same epigastric and right upper quadrant discomfort that she had on admission. She has been having bowel movements. Appetite good. Afebrile last 24 hours. Blood pressure 141/70. SPO2 97 on 2 L nasal cannula. Patient underwent Doppler ultrasound of lower extremities, no evidence of DVT either extremity. CO2 31 BUN 24 creatinine 4.3 fasting glucose 255 magnesium 2.1. Liver functions normal.  Hemoglobin 9.7 WBC 8.8. Platelets 893. Vascular consult from yesterday appreciated. Patient afraid to restart apixaban therefore IVC filter. I spoke by phone with vascular surgeon. Patient wants to continue with PPIs and consider reinitiation of anticoagulation when ulcer is healed. In the meantime she would like to have IVC filter placed. Patient will need to be transferred to [facility] for the above procedure. After procedure patient will return to [facility]. GI note from today, diet as tolerated when able. Repeat EGD in 2 to 3 weeks as outpatient, before starting apixaban.      1/21/2023-patient seen while in dialysis. No chest pain no dyspnea feeling good hoping for discharge. No groin pain since IVC filter inserted. Appetite good. Afebrile last 24 hours. Pulse 70 blood pressure 136/56. SPO2 99 on 2 L nasal cannula. Intake and output -4020 cc. Sodium 131 BUN 35 creatinine 5.8. Fasting glucose 316 phosphorus 5.3. Liver functions normal.  Hemoglobin 8.8 WBC 8.3. Vascular note from yesterday, right groin puncture site looks clean without hematoma. May be discharged from vascular perspective. Nephrology note from today appreciated. Nephrology would like to remove more fluid at least 2 L per hemodialysis session. Patient states she does not handle that well. Okay for discharge from renal point of view. According to discharge med reconciliation the patient does not take Lantus nor Humalog since she does not have a glucometer. Glucometer test strips etc. will be ordered.       Objective:     PHYSICAL EXAM:    VS: BP (!) 136/56   Pulse 70   Temp 97.7 °F (36.5 °C) (Oral)   Resp 18   Ht 5' 4\" (1.626 m)   Wt 260 lb 9.3 oz (118.2 kg)   SpO2 99%   BMI 44.73 kg/m²     Labs:   CBC:   Lab Results   Component Value Date/Time    WBC 8.3 01/21/2023 04:04 AM    RBC 2.86 01/21/2023 04:04 AM    HGB 8.8 01/21/2023 04:04 AM    HCT 27.7 01/21/2023 04:04 AM    MCV 96.9 01/21/2023 04:04 AM    MCH 30.8 01/21/2023 04:04 AM    MCHC 31.8 01/21/2023 04:04 AM    RDW 14.5 01/21/2023 04:04 AM     01/21/2023 04:04 AM    MPV 10.4 01/21/2023 04:04 AM     CBC with Differential:    Lab Results   Component Value Date/Time    WBC 8.3 01/21/2023 04:04 AM    RBC 2.86 01/21/2023 04:04 AM    HGB 8.8 01/21/2023 04:04 AM    HCT 27.7 01/21/2023 04:04 AM     01/21/2023 04:04 AM    MCV 96.9 01/21/2023 04:04 AM    MCH 30.8 01/21/2023 04:04 AM    MCHC 31.8 01/21/2023 04:04 AM    RDW 14.5 01/21/2023 04:04 AM    NRBC 0.0 01/13/2022 04:38 AM    LYMPHOPCT 13.1 01/21/2023 04:04 AM    MONOPCT 8.0 01/21/2023 04:04 AM    BASOPCT 0.6 01/21/2023 04:04 AM    MONOSABS 0.66 01/21/2023 04:04 AM    LYMPHSABS 1.09 01/21/2023 04:04 AM    EOSABS 1.12 01/21/2023 04:04 AM    BASOSABS 0.05 01/21/2023 04:04 AM     Hemoglobin/Hematocrit:    Lab Results   Component Value Date/Time    HGB 8.8 01/21/2023 04:04 AM    HCT 27.7 01/21/2023 04:04 AM     CMP:    Lab Results Component Value Date/Time     01/21/2023 04:04 AM    K 4.2 01/21/2023 04:04 AM    K 4.2 08/23/2022 12:12 PM    CL 90 01/21/2023 04:04 AM    CO2 28 01/21/2023 04:04 AM    BUN 35 01/21/2023 04:04 AM    CREATININE 5.8 01/21/2023 04:04 AM    GFRAA 8 09/22/2022 12:14 PM    LABGLOM 8 01/21/2023 04:04 AM    GLUCOSE 316 01/21/2023 04:04 AM    PROT 6.8 01/21/2023 04:04 AM    LABALBU 3.7 01/21/2023 04:04 AM    CALCIUM 8.2 01/21/2023 04:04 AM    BILITOT 0.3 01/21/2023 04:04 AM    ALKPHOS 82 01/21/2023 04:04 AM    AST 11 01/21/2023 04:04 AM    ALT 9 01/21/2023 04:04 AM     BMP:    Lab Results   Component Value Date/Time     01/21/2023 04:04 AM    K 4.2 01/21/2023 04:04 AM    K 4.2 08/23/2022 12:12 PM    CL 90 01/21/2023 04:04 AM    CO2 28 01/21/2023 04:04 AM    BUN 35 01/21/2023 04:04 AM    LABALBU 3.7 01/21/2023 04:04 AM    CREATININE 5.8 01/21/2023 04:04 AM    CALCIUM 8.2 01/21/2023 04:04 AM    GFRAA 8 09/22/2022 12:14 PM    LABGLOM 8 01/21/2023 04:04 AM    GLUCOSE 316 01/21/2023 04:04 AM     Hepatic Function Panel:    Lab Results   Component Value Date/Time    ALKPHOS 82 01/21/2023 04:04 AM    ALT 9 01/21/2023 04:04 AM    AST 11 01/21/2023 04:04 AM    PROT 6.8 01/21/2023 04:04 AM    BILITOT 0.3 01/21/2023 04:04 AM    BILIDIR <0.2 01/21/2023 04:04 AM    IBILI see below 01/21/2023 04:04 AM    LABALBU 3.7 01/21/2023 04:04 AM     Ionized Calcium:  No results found for: IONCA  Magnesium:    Lab Results   Component Value Date/Time    MG 2.1 01/21/2023 04:04 AM     Phosphorus:    Lab Results   Component Value Date/Time    PHOS 5.3 01/21/2023 04:04 AM     LDH:    Lab Results   Component Value Date/Time     01/11/2022 12:45 PM     Uric Acid:    Lab Results   Component Value Date/Time    LABURIC 3.3 12/04/2022 05:30 AM     PT/INR:    Lab Results   Component Value Date/Time    PROTIME 15.8 01/18/2023 01:10 PM    INR 1.4 01/18/2023 01:10 PM     Warfarin PT/INR:  No components found for: PTPATWAR, PTINRWAR  PTT: Lab Results   Component Value Date/Time    APTT 34.5 01/17/2023 08:35 AM   [APTT}  Troponin:  No results found for: TROPONINI  Last 3 Troponin:  No results found for: TROPONINI  U/A:    Lab Results   Component Value Date/Time    COLORU Yellow 11/18/2021 03:19 AM    PROTEINU >=300 11/18/2021 03:19 AM    PHUR 6.0 11/18/2021 03:19 AM    WBCUA 1-3 11/18/2021 03:19 AM    RBCUA 0-1 11/18/2021 03:19 AM    BACTERIA FEW 11/18/2021 03:19 AM    CLARITYU Clear 11/18/2021 03:19 AM    SPECGRAV 1.020 11/18/2021 03:19 AM    LEUKOCYTESUR Negative 11/18/2021 03:19 AM    UROBILINOGEN 0.2 11/18/2021 03:19 AM    BILIRUBINUR Negative 11/18/2021 03:19 AM    BLOODU SMALL 11/18/2021 03:19 AM    GLUCOSEU 500 11/18/2021 03:19 AM     ABG:  No results found for: PH, PCO2, PO2, HCO3, BE, THGB, TCO2, O2SAT  HgBA1c:    Lab Results   Component Value Date/Time    LABA1C 11.9 01/17/2023 06:35 PM     FLP:    Lab Results   Component Value Date/Time    TRIG 173 01/18/2023 03:55 AM    HDL 41 01/18/2023 03:55 AM    LDLCALC 79 01/18/2023 03:55 AM    LABVLDL 35 01/18/2023 03:55 AM     TSH:    Lab Results   Component Value Date/Time    TSH 1.470 12/03/2022 12:09 PM     VITAMIN B12: No components found for: B12  FOLATE:  No results found for: FOLATE  IRON:    Lab Results   Component Value Date/Time    IRON 35 01/18/2023 03:55 AM     Iron Saturation:  No components found for: PERCENTFE  TIBC:    Lab Results   Component Value Date/Time    TIBC 173 01/18/2023 03:55 AM     FERRITIN:    Lab Results   Component Value Date/Time    FERRITIN 1,513 01/18/2023 03:55 AM        General appearance: Alert, Awake, Oriented times 3, no distress ; morbidly obese; nasal cannula oxygen in place  Skin: Warm and dry ; no rashes  Head: Normocephalic. No masses, lesions or tenderness noted  Eyes: Conjunctivae pale, sclera white. PERRL,EOM-I  Ears: External ears normal  Nose/Sinuses: Nares normal. Septum midline.  Mucosa normal. No drainage  Oropharynx: Oropharynx clear with no exudate seen  Neck: Supple. No jugular venous distension, lymphadenopathy or thyromegaly Trachea midline  Lungs: Clear to auscultation bilaterally. No rhonchi, crackles or wheezes  Heart: S1 S2  Regular rate and rhythm. No rub, gallop, murmur  Abdomen: Soft, mildly tender right upper quadrant and epigastrium. BS normal. No masses, organomegaly; no rebound or guarding  Extremities: Chronic 2-3+ bilateral edema since starting dialysis. Musculoskeletal: Muscular strength appears intact. Neuro:  No focal motor defects ; II-XII grossly intact . MORRIS equally    TELEMETRY: REVIEWED--Telemetry: Sinus    ASSESSMENT:   Principal Problem:    Hematemesis  Active Problems:    Acute pulmonary embolism (HCC)    H/O heart artery stent    Diabetes mellitus (HCC)    End-stage renal disease on hemodialysis (HCC)    Leg swelling    Lymphedema of both lower extremities    Essential hypertension    Diabetes mellitus type 2 with complications (HCC)    Pulmonary hypertension, unspecified (HCC)    Obesity, Class III, BMI 40-49.9 (morbid obesity) ([facility] [address]  Resolved Problems:    * No resolved hospital problems.  *      PLAN:  SEE ORDERS      RE  CHANGES AND FINDINGS   Medications reviewed with patient  GI prophylaxis  DVT prophylaxis  Consultants notes reviewed  Apixaban and anticoagulants on hold  Lantus 24 units nightly  Low-dose sliding scale insulin  Protonix 40 mg IV  Continue hemodialysis  Midodrine 10 mg as needed for low blood pressure dialysis  EGD today  1/19/2023  Continue hemodialysis  Apixaban anticoagulants on hold  Need further input from GI, if no resumption of apixaban will need IVC filter  Lantus 24 units nightly  Low-dose sliding scale insulin  Protonix increased to 40 mg IV every 12 hours  Vascular consult for IVC filter  1/20/2023  Anticoagulants on hold  IVC filter  placed today   Retacrit 30 units/kg Monday Wednesday Friday subcu  Lantus 24 units nightly  Low-dose sliding scale insulin  Protonix 40 mg IV every 12 hours  Monitor labs  Zofran 4 mg IV every 6 hours as needed for nausea  Dialysis to be done in the a.m. Possible discharge following dialysis if everyone is in agreement  1/21/2023  Med reconciliation completed  Prescriptions written  Dialysis today  Follow-up Dr. Dl Arenas 1 week  Follow-up GI 2 to 3 weeks for repeat endoscopy  Follow-up vascular surgery per their instructions  DME for glucometer and test strips and supplies  Do not resume apixaban  Lantus 24 units nightly  Sliding scale insulin with Humalog KwikPen  Rosuvastatin 5 mg daily  PhosLo 667, 3 times daily  Midodrine 10 mg daily at dialysis if needed        Discussed with patient and adult child and nursing. Timothy Anne DO     8:49 AM     1/21/2023    TIME > 35 MINUTES      Please note that over 35 minutes was spent . Greater than 51% includes interviewing patient and reviewing chart; performing medical examination; ordering medications, tests and/or procedures; formulating assessment plan, reviewing rationale for the above recommendations; reviewing available records, results of all previously ordered tests and procedures and current problem list; counseling/educating the patient; coordinating care with other healthcare professionals; communicating results to the patient's family/caregiver; documenting clinical information in the electronic record                    ------------  INFORMATION  -----------      DIET:ADULT DIET; Regular; 4 carb choices (60 gm/meal); GI [place] (GERD/Peptic Ulcer)        Allergies   Allergen Reactions    Pcn [Penicillins] Shortness Of Breath and Rash    Benzonatate Other (See Comments)     \"PATIENTS STATES \"IT WAS LIKE I WAS DRUNK. \"    Morphine Itching and Rash    Sodium Hypochlorite Nausea And Vomiting and Rash     AKA BLEACH.   RASH FROM SKIN EXPOSURE          MEDICATION SIDE EFFECTS:none       SCHEDULED MEDS:                                 Scheduled Meds:   pantoprazole (PROTONIX) 40 mg injection  40 mg IntraVENous Q12H    insulin glargine  24 Units SubCUTAneous Nightly    insulin lispro  0-4 Units SubCUTAneous TID WC    insulin lispro  0-4 Units SubCUTAneous Nightly    epoetin sadia-epbx  30 Units/kg SubCUTAneous Once per day on Mon Wed Fri       Continuous Infusions:   dextrose           Data:     No intake or output data in the 24 hours ending 01/21/23 0849      Wt Readings from Last 3 Encounters:   01/19/23 260 lb 9.3 oz (118.2 kg)   12/22/22 261 lb (118.4 kg)   12/13/22 250 lb (113.4 kg)       Labs: Additional    GLUCOSE:No results for input(s): POCGLU in the last 72 hours. BNP:No results found for: BNP    CRP:   No results for input(s): CRP in the last 72 hours. ESR:No results for input(s): SEDRATE in the last 72 hours. RADIOLOGY: REVIEWED AVAILABLE REPORT  US DUP LOWER EXTREMITIES BILATERAL VENOUS   Final Result   No evidence of DVT in either lower extremity.                    [address]    8:49 AM     1/21/2023      Voice recognition software used for dictation

## 2023-01-21 NOTE — PLAN OF CARE
Problem: ABCDS Injury Assessment  Goal: Absence of physical injury  1/19/2023 2255 by Tiffany Mancuso RN  Outcome: Progressing     Problem: Discharge Planning  Goal: Discharge to home or other facility with appropriate resources  1/19/2023 2255 by Tiffany Mancuso RN  Outcome: Progressing     Problem: Pain  Goal: Verbalizes/displays adequate comfort level or baseline comfort level  1/19/2023 2255 by Tiffany Mancuso RN  Outcome: Progressing     Problem: Chronic Conditions and Co-morbidities  Goal: Patient's chronic conditions and co-morbidity symptoms are monitored and maintained or improved  1/19/2023 2255 by Tiffany Mancuso RN  Outcome: Progressing     Problem: Safety - Adult  Goal: Free from fall injury  1/19/2023 2255 by Tiffany Mancuso RN  Outcome: Progressing
Problem: ABCDS Injury Assessment  Goal: Absence of physical injury  Outcome: Progressing     Problem: Discharge Planning  Goal: Discharge to home or other facility with appropriate resources  Outcome: Progressing     Problem: Pain  Goal: Verbalizes/displays adequate comfort level or baseline comfort level  Outcome: Progressing     Problem: Chronic Conditions and Co-morbidities  Goal: Patient's chronic conditions and co-morbidity symptoms are monitored and maintained or improved  Outcome: Progressing     Problem: Safety - Adult  Goal: Free from fall injury  Outcome: Progressing
Problem: ABCDS Injury Assessment  Goal: Absence of physical injury  Outcome: Progressing     Problem: Discharge Planning  Goal: Discharge to home or other facility with appropriate resources  Outcome: Progressing     Problem: Pain  Goal: Verbalizes/displays adequate comfort level or baseline comfort level  Outcome: Progressing     Problem: Chronic Conditions and Co-morbidities  Goal: Patient's chronic conditions and co-morbidity symptoms are monitored and maintained or improved  Outcome: Progressing     Problem: Safety - Adult  Goal: Free from fall injury  Outcome: Progressing
(3) walks occasionally

## 2023-01-21 NOTE — FLOWSHEET NOTE
01/21/23 1327   Vital Signs   /61   Temp 98 °F (36.7 °C)   Heart Rate 72   Resp 16   Weight 255 lb 11.7 oz (116 kg)   Weight Method Estimated   Percent Weight Change -1.86   Pain Assessment   Pain Assessment None - Denies Pain   Pain Level 0   Post-Hemodialysis Assessment   Post-Treatment Procedures Blood returned; Access bleeding time < 10 minutes   Machine Disinfection Process Exterior Machine Disinfection   Rinseback Volume (ml) 300 ml   Dialyzer Clearance Clear   Duration of Treatment (minutes) 240 minutes   Hemodialysis Intake (ml) 300 ml   Hemodialysis Output (ml) 2300 ml   NET Removed (ml) 2000   Tolerated Treatment Good   Patient Response to Treatment tolerated well   Bilateral Breath Sounds Diminished   Edema Right lower extremity; Left lower extremity   Time Off 1315   Patient Disposition Return to room

## 2023-01-21 NOTE — DISCHARGE INSTRUCTIONS
CALL DR. Peri Slater OFFICE TO SCHEDULE REPEAT EGD IN 2-3 WEEKS   [phone]  [address] [place] [address],[address], [address] [place] [address]

## 2023-01-21 NOTE — PROGRESS NOTES
[facility] MD Gorge Phillips, MD Adrien Feliciano, CNP   Jaja Echavarria, YOSHI Henao, CASH  Progress Note    1/21/2023    SUBJECTIVE:   1/18: Sitting up in the chair. No acute complaints. She signed off hemodialysis yesterday due to discomfort from being on the hospital [facility]. She does not think that there will be plans for a EGD and hopes to be discharged. Eliquis is on hold until a decision is made. No acute complaints. ROS is unremarkable. 1/19:  Seen while on dialysis. Tolerated well. On 2L/NC. Denies chest pain or SOB. Denies nausea, vomiting or abd pain. Wants to be discharged. 1/21 seen on HD tolerating treatment . Has 2+ edema in LE , I talked with her re try to achieve more UF (max per 2 L/HD session) she said she does not handle well more UF and she has always been this way     PROBLEM LIST:    Principal Problem:    Hematemesis  Active Problems:    Acute pulmonary embolism ([address]    H/O heart artery stent    Diabetes mellitus ([address]    End-stage renal disease on hemodialysis ([address]    Leg swelling    Lymphedema of both lower extremities    Essential hypertension    Diabetes mellitus type 2 with complications ([address]    Pulmonary hypertension, unspecified (HCC)    Obesity, Class III, BMI 40-49.9 (morbid obesity) ([address]  Resolved Problems:    * No resolved hospital problems. *         DIET:    ADULT DIET;  Regular; 4 carb choices (60 gm/meal); GI [place] (GERD/Peptic Ulcer)     MEDS (scheduled):    pantoprazole (PROTONIX) 40 mg injection  40 mg IntraVENous Q12H    insulin glargine  24 Units SubCUTAneous Nightly    insulin lispro  0-4 Units SubCUTAneous TID WC    insulin lispro  0-4 Units SubCUTAneous Nightly    epoetin sadia-epbx  30 Units/kg SubCUTAneous Once per day on Mon Wed Fri       MEDS (infusions):   dextrose         MEDS (prn):  ondansetron, acetaminophen, midodrine, glucose, dextrose bolus **OR** dextrose bolus, glucagon (rDNA), dextrose, diphenhydrAMINE    PHYSICAL EXAM:     Patient Vitals for the past 24 hrs:   BP Temp Temp src Pulse Resp SpO2   01/21/23 1130 112/66 -- -- -- -- --   01/21/23 1100 (!) 141/63 -- -- -- -- --   01/21/23 1030 (!) 144/65 -- -- -- -- --   01/21/23 1000 122/61 -- -- -- -- --   01/21/23 0930 131/62 -- -- -- -- --   01/21/23 0915 (!) 127/55 -- -- -- -- --   01/21/23 0900 122/62 98 °F (36.7 °C) -- -- -- --   01/21/23 0712 (!) 136/56 97.7 °F (36.5 °C) Oral 70 18 99 %   01/21/23 0400 (!) 98/44 98.5 °F (36.9 °C) Oral 72 18 98 %   01/21/23 0000 -- -- -- 75 -- --   01/20/23 2000 (!) 124/53 97.8 °F (36.6 °C) Oral 70 18 94 %   01/20/23 1519 (!) 155/86 97.3 °F (36.3 °C) Oral 85 18 --     @    No intake or output data in the 24 hours ending 01/21/23 1217        Wt Readings from Last 3 Encounters:   01/19/23 260 lb 9.3 oz (118.2 kg)   12/22/22 261 lb (118.4 kg)   12/13/22 250 lb (113.4 kg)       Constitutional:  in no acute distress  HEENT: NC/AT, EOMI, sclera and conjunctiva are clear and anicteric, mucus membranes moist  Neck: Trachea midline, no JVD  Cardiovascular: S1, S2 regular rhythm, no murmur,or rub  Respiratory: CTAB, diminished in the bases. No crackles, no wheeze  Gastrointestinal:  Soft, nontender, nondistended, NABS  Ext: +2 BLE edema, feet warm  Skin: dry, no rash.  Red/jefferson BLE  Neuro: awake, alert, interactive      DATA:    Recent Labs     01/19/23  1439 01/20/23  0134 01/21/23  0404   WBC 9.3 8.8 8.3   HGB 9.8* 9.7* 8.8*   HCT 30.4* 30.7* 27.7*   MCV 96.2 98.1 96.9    141 147       Recent Labs     01/19/23  0300 01/20/23  0134 01/21/23  0404    136 131*   K 4.0 4.1 4.2   CL 95* 93* 90*   CO2 28 31* 28   MG 2.2 2.1 2.1   PHOS 5.3* 3.8 5.3*   BUN 46* 24* 35*   CREATININE 6.3* 4.3* 5.8*   ALT 8 7 9   AST 12 12 11   BILIDIR <0.2 <0.2 <0.2   BILITOT 0.3 0.4 0.3   ALKPHOS 89 90 82         No results found for: LABPROT    Assessment  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, renal microvascular atherosclerotic disease, and history of acute kidney injury due to contrast-induced nephropathy  Anemia due to ESRD  Secondary hyperparathyroidism/mineral bone disease due to ESRD  History of hypertension, typically well controlled    Removed 3L on dialysis treatment today  GI note reviewed      Recommendations  Continue IHD support for solute and volume clearance on Tuesdays Thursdays and Saturdays as per outpatient orders and dry weight  Continue JHONATAN: Retacrit while here  Continue other aspects of renal management  Follow labs, BMP  OK for discharge strictly from  renal POV      Electronically signed by Keyanna Gupta MD on 1/21/2023 at 12:17 PM

## 2023-01-23 ENCOUNTER — TELEPHONE (OUTPATIENT)
Dept: PRIMARY CARE CLINIC | Age: 68
End: 2023-01-23

## 2023-01-23 RX ORDER — DIPHENHYDRAMINE HYDROCHLORIDE 25 MG/1
CAPSULE, LIQUID FILLED ORAL
COMMUNITY

## 2023-01-23 NOTE — DISCHARGE SUMMARY
DISCHARGE SUMMARY  Patient ID:  Brian Fajardo  [id number]  6 y.o.  1955    Admit date: 1/17/2023      Discharge date : 1/21/2023      Admission Diagnoses: Hematemesis [K92.0]    Discharge Diagnosis  Principal Problem:    Hematemesis  Active Problems:    Acute pulmonary embolism (HCC)    H/O heart artery stent    Diabetes mellitus (HCC)    End-stage renal disease on hemodialysis (HCC)    Leg swelling    Lymphedema of both lower extremities    Essential hypertension    Diabetes mellitus type 2 with complications ([facility]    Pulmonary hypertension, unspecified (HCC)    Obesity, Class III, BMI 40-49.9 (morbid obesity) ([facility]  Resolved Problems:    * No resolved hospital problems. *      Hospital Course:       CHIEF COMPLAINT: Vomiting blood     History of Present Illness: 60-year-old female patient Dr. Darian Mcgraw I am asked admit and follow. History obtained from patient as well as extensive review electronic record. Patient was discharged from this hospital 12/6/2022 with final diagnosis of acute pulmonary embolus. She was experiencing chest pain and back pain on her way to dialysis. CTA done at that time with following results--       Impression:       1. Pulmonary embolus seen within the segmental and subsegmental pulmonary   arteries of the medial basal segment and posterior basal segment of the right   lower lobe. 2. There are no findings of right ventricular strain   3. There is no pulmonary embolus seen within the pulmonary arteries of the   left lung   4. Patchy airspace disease within the right lower lobe which could represent   pneumonia or possibly an early pulmonary infarct   5. Linear atelectasis seen within the left lung base   6. Small amount of pleural fluid seen within the right major fissure. At discharge she was placed on apixaban 10 mg twice a day for x7 days and then 5 mg twice daily.   She was on her way to dialysis this morning an episode of emesis that was brown tinge presumably blood.  Never had seen this before. Her last dialysis was on 2023. --In the ED blood pressure 179/67. SPO2 was 98 on room air; 97 on 3 L nasal cannula. --In the ED BUN 89 creatinine 8.6 potassium 5.3. Glucose was 472. Liver functions were normal.  WBC was 12.9; hemoglobin was 9.3. Previous hemoglobin was 10.1 from 2022. INR was 1.5.  --From the ED she was sent to hemodialysis. --Patient underwent EGD 2021 with following results--     DATE OF PROCEDURE:  2021     PROCEDURE PERFORMED:  Upper endoscopy with biopsy. PREOPERATIVE DIAGNOSES:  Evaluation for anemia and epigastric pain. POSTOPERATIVE DIAGNOSES:  Grade B LA classification GERD. Gastritis,  biopsied to rule out H. pylori infection. Duodenum showed scalloping  suspicious for celiac disease; biopsies were done. -- Biopsy results from that time revealed chronic duodenitis with features of peptic duodenitis. BRANDIE test was negative. -- Patient developed diabetes approximately age 49. She had been on metformin originally; started on insulin at her last hospitalization 2022. --Smoking approximately  quit  1 pack daily 15 years  --Mother  age 62 acute MI, father  age 62 CVA  --Coronary artery disease with PTCA PCI done while living in [place],   --Hemodialysis she states 2021; [facility] states 2021  --Denies any rheumatic fever scarlet fever polio diphtheria cancer          2023-SUBJECTIVE: Viral Brooks is alert awake and cooperative; oriented ×3. Denies any chest pain dyspnea nausea emesis. Tolerating diet. No abdominal pain. Sitting in bedside chair; daughter present through the exam.  No complaints voiced at this time feels good. EGD scheduled for this afternoon. Afebrile last 24 hours. Blood pressure 123/52. SPO2 100 on 4 L nasal cannula. Intake and output -2200 cc. CO2 32 BUN 34 creatinine improved 4.7. Ionized calcium low at 1.08. Fasting glucose 198.   LDL 79 triglycerides 173. Liver functions normal.  Most recent hemoglobin 9.6, minimal change since admission. Nephrology consult from yesterday appreciated. Retacrit while here; continue hemodialysis Tuesdays Thursdays Saturdays. Pulmonary consult yesterday appreciated. Hold apixaban for now no indication for reversal agent. Await GI input possible endoscopy to determine whether apixaban can be resumed. Patient underwent hemodialysis yesterday with net removal of 2200 cc. Pulmonary note from today, continue off apixaban until GI input. Nephrology note from today appreciated. Patient signed out of hemodialysis yesterday, too much discomfort laying on the gurney. (Patient states dialysis was shortened by 20 minutes). Patient does not think there are plans for EGD and hopes to be discharged. There are plans for EGD today. 1/19/2023-patient sitting in bedside chair, daughter present through the exam.  No chest pain no dyspnea at present time. I discussed at length findings on endoscopy-multiple superficial ulcers. I also discussed possible IVC filter. Patient extremely reluctant to restart apixaban would rather IVC filter if possible. I was advised by physician advisor to change patient to inpatient status. Afebrile last 24 hours. Blood pressure 114/56. SPO2 96 on 2 L nasal cannula. Intake and output -4500 cc. BUN 46 creatinine 6.3 fasting glucose 279. Phosphorus 5.3 calcium low 8.3. Liver functions normal.  CBC pending. Physical therapy AMPAC score from yesterday 24/24. Patient underwent EGD yesterday with following results--     DESCRIPTION OF PROCEDURE:  With the patient in the left lateral  decubitus position, the Olympus GIF-100 forward-viewing videoscope was  introduced into the esophagus, the evaluation of which showed grade B LA  classification GERD and no hiatal hernia was seen.      The scope was then advanced through the gastroesophageal junction into  the gastric body, along the greater curvature. Evaluation of the body  of the stomach showed minimal gastritis. Biopsies were taken from this  area to rule out Helicobacter pylori infection. The scope was then advanced through the pylorus into the duodenal bulb  and second portion of the duodenum and evaluation showed severe  duodenitis with edema and superficial ulceration. Biopsies were done to  rule out sprue. The scope was then retrieved and retroflexed in the  prepyloric antrum, with thorough evaluation of the cardiac and fundal  portions of the stomach, which appeared to be within normal limits. The scope was then straightened, the area deflated, and the procedure  was terminated by withdrawing the scope and conducting a second look on  the way out, which was essentially the same. The patient tolerated the procedure well. Pulmonary note from today appreciated. No difficulties with breathing. Await GI input regarding resumption of apixaban; may need IVC filte if no approval for apixaban. Continue oxygen taper as tolerated. GI note from today appreciated. Increase Protonix to twice daily. Monitor stools. Nephrology note from today, on 2 L nasal cannula no vomiting no abdominal pain wants to be discharged. Continue hemodialysis; continue Retacrit while here. Patient underwent hemodialysis today, net removal of 2700 cc.      1/20/2023-sitting in bedside chair; no chest pain no dyspnea. Daughter present through the exam.  Had IVC filter inserted this morning; she was n.p.o. for the procedure started getting nauseated because lack of food. Still having the same epigastric and right upper quadrant discomfort that she had on admission. She has been having bowel movements. Appetite good. Afebrile last 24 hours. Blood pressure 141/70. SPO2 97 on 2 L nasal cannula. Patient underwent Doppler ultrasound of lower extremities, no evidence of DVT either extremity.   CO2 31 BUN 24 creatinine 4.3 fasting glucose 255 magnesium 2.1. Liver functions normal.  Hemoglobin 9.7 WBC 8.8. Platelets 171. Vascular consult from yesterday appreciated. Patient afraid to restart apixaban therefore IVC filter. I spoke by phone with vascular surgeon. Patient wants to continue with PPIs and consider reinitiation of anticoagulation when ulcer is healed. In the meantime she would like to have IVC filter placed. Patient will need to be transferred to [facility] for the above procedure. After procedure patient will return to [facility]. GI note from today, diet as tolerated when able. Repeat EGD in 2 to 3 weeks as outpatient, before starting apixaban.        1/21/2023-patient seen while in dialysis. No chest pain no dyspnea feeling good hoping for discharge. No groin pain since IVC filter inserted. Appetite good. Afebrile last 24 hours. Pulse 70 blood pressure 136/56. SPO2 99 on 2 L nasal cannula. Intake and output -4020 cc. Sodium 131 BUN 35 creatinine 5.8. Fasting glucose 316 phosphorus 5.3. Liver functions normal.  Hemoglobin 8.8 WBC 8.3. Vascular note from yesterday, right groin puncture site looks clean without hematoma. May be discharged from vascular perspective. Nephrology note from today appreciated. Nephrology would like to remove more fluid at least 2 L per hemodialysis session. Patient states she does not handle that well. Okay for discharge from renal point of view. According to discharge med reconciliation the patient does not take Lantus nor Humalog since she does not have a glucometer. Glucometer test strips etc. will be ordered. Hospital orders:     PLAN:  Medications discussed with patient  GI prophylaxis  DVT prophylaxis  Apixaban on hold  PhosLo on hold  Rosuvastatin on hold  GIs been consulted for possible endoscopy  Consult to nephrology regarding dialysis  Consult pulmonary regarding anticoagulation  Lantus insulin 24 units daily  Low-dose sliding scale insulin  ProAmatine 10 mg daily as needed at dialysis  Procalcitonin  Admit to telemetry. Admit observation  Protonix 40 mg IV every day  Clear liquid diet  Beta hydroxybutyrate  Hemoccult stool  Brain natruretic peptide      RE  CHANGES AND FINDINGS   Medications reviewed with patient  GI prophylaxis  DVT prophylaxis  Consultants notes reviewed  Apixaban and anticoagulants on hold  Lantus 24 units nightly  Low-dose sliding scale insulin  Protonix 40 mg IV  Continue hemodialysis  Midodrine 10 mg as needed for low blood pressure dialysis  EGD today  1/19/2023  Continue hemodialysis  Apixaban anticoagulants on hold  Need further input from GI, if no resumption of apixaban will need IVC filter  Lantus 24 units nightly  Low-dose sliding scale insulin  Protonix increased to 40 mg IV every 12 hours  Vascular consult for IVC filter  1/20/2023  Anticoagulants on hold  IVC filter  placed today   Retacrit 30 units/kg Monday Wednesday Friday subcu  Lantus 24 units nightly  Low-dose sliding scale insulin  Protonix 40 mg IV every 12 hours  Monitor labs  Zofran 4 mg IV every 6 hours as needed for nausea  Dialysis to be done in the a.m.   Possible discharge following dialysis if everyone is in agreement      Instructions at discharge:    1/21/2023  Med reconciliation completed  Prescriptions written  Dialysis today  Follow-up Dr. Dawson Da Silva 1 week  Follow-up GI 2 to 3 weeks for repeat endoscopy  Follow-up vascular surgery per their instructions  DME for glucometer and test strips and supplies  Do not resume apixaban  Lantus 24 units nightly  Sliding scale insulin with Humalog KwikPen  Rosuvastatin 5 mg daily  PhosLo 667, 3 times daily  Midodrine 10 mg daily at dialysis if needed    Condition at DISCHARGE : STABLE AND IMPROVED     Discharged to: Home    Discharge Instructions: Medications reviewed with patient    Consults:pulmonary/intensive care, GI, nephrology, and vascular surgery     Past Medical Hx :   Past Medical History:   Diagnosis Date Acute pulmonary embolism ([address] 12/03/2022    Anemia in CKD (chronic kidney disease) 11/30/2021    Anxiety and depression 01/19/2022    At high risk for falls 01/19/2022    Brain aneurysm     CLABSI (central line-associated bloodstream infection) 11/19/2021    Cough 01/19/2022    Diabetes mellitus ([address] 2006    Diabetes mellitus type 2 with complications ([address] 31/95/0390    Dizziness on standing 01/19/2022    End-stage renal disease on hemodialysis ([address] 01/19/2023    ESRD (end stage renal disease) ([address] 11/19/2021    ESRD on hemodialysis ([address] 11/19/2021    Essential hypertension 11/30/2021    Fever, unspecified     Gastrointestinal hemorrhage 11/30/2021    H/O heart artery stent 2015    Done in [place]    History of Clostridioides difficile colitis 01/19/2022    Hyperlipidemia     Hypertension     Leg swelling 01/19/2023    Lymphedema of both lower extremities 01/19/2023    MSSA bacteremia 11/18/2021    Nausea & vomiting 11/18/2021    Obesity, Class III, BMI 40-49.9 (morbid obesity) ([address] 44/27/9203    Periumbilical abdominal pain     S/P insertion of inferior vena caval filter 01/20/2023       Past Surgical Hx :  Past Surgical History:   Procedure Laterality Date    CARDIAC CATHETERIZATION  2015    Done in [address] [place] [address]  2017    Negative findings    DIALYSIS FISTULA CREATION Left 01/28/2022    REVISION AV FISTULA LEFT ARM performed by Maria Isabel Salinas MD at [address]    PTCA  2015    PTCA [facility] N/A 12/01/2021    EGD BIOPSY performed by Tigist Shaffer MD at [address] N/A 1/18/2023    EGD BIOPSY performed by Tigist Shaffer MD at [address] [place] [address] N/A 11/24/2021    CATHETER INSERTION  TUNNELED HEMODIALYSIS, WITH REMOVAL OF TEMPORARY CATHETER performed by Maria Isabel Salinas MD at [address] [place] [address] Rd:   CBC:   Lab Results   Component Value Date/Time    WBC 8.3 01/21/2023 04:04 AM    RBC 2.86 01/21/2023 04:04 AM    HGB 8.8 01/21/2023 04:04 AM    HCT 27.7 01/21/2023 04:04 AM    MCV 96.9 01/21/2023 04:04 AM    MCH 30.8 01/21/2023 04:04 AM    MCHC 31.8 01/21/2023 04:04 AM    RDW 14.5 01/21/2023 04:04 AM     01/21/2023 04:04 AM    MPV 10.4 01/21/2023 04:04 AM     CBC with Differential:    Lab Results   Component Value Date/Time    WBC 8.3 01/21/2023 04:04 AM    RBC 2.86 01/21/2023 04:04 AM    HGB 8.8 01/21/2023 04:04 AM    HCT 27.7 01/21/2023 04:04 AM     01/21/2023 04:04 AM    MCV 96.9 01/21/2023 04:04 AM    MCH 30.8 01/21/2023 04:04 AM    MCHC 31.8 01/21/2023 04:04 AM    RDW 14.5 01/21/2023 04:04 AM    NRBC 0.0 01/13/2022 04:38 AM    LYMPHOPCT 13.1 01/21/2023 04:04 AM    MONOPCT 8.0 01/21/2023 04:04 AM    BASOPCT 0.6 01/21/2023 04:04 AM    MONOSABS 0.66 01/21/2023 04:04 AM    LYMPHSABS 1.09 01/21/2023 04:04 AM    EOSABS 1.12 01/21/2023 04:04 AM    BASOSABS 0.05 01/21/2023 04:04 AM     Hemoglobin/Hematocrit:    Lab Results   Component Value Date/Time    HGB 8.8 01/21/2023 04:04 AM    HCT 27.7 01/21/2023 04:04 AM     CMP:    Lab Results   Component Value Date/Time     01/21/2023 04:04 AM    K 4.2 01/21/2023 04:04 AM    K 4.2 08/23/2022 12:12 PM    CL 90 01/21/2023 04:04 AM    CO2 28 01/21/2023 04:04 AM    BUN 35 01/21/2023 04:04 AM    CREATININE 5.8 01/21/2023 04:04 AM    GFRAA 8 09/22/2022 12:14 PM    LABGLOM 8 01/21/2023 04:04 AM    GLUCOSE 316 01/21/2023 04:04 AM    PROT 6.8 01/21/2023 04:04 AM    LABALBU 3.7 01/21/2023 04:04 AM    CALCIUM 8.2 01/21/2023 04:04 AM    BILITOT 0.3 01/21/2023 04:04 AM    ALKPHOS 82 01/21/2023 04:04 AM    AST 11 01/21/2023 04:04 AM    ALT 9 01/21/2023 04:04 AM     BMP:    Lab Results   Component Value Date/Time     01/21/2023 04:04 AM    K 4.2 01/21/2023 04:04 AM    K 4.2 08/23/2022 12:12 PM    CL 90 01/21/2023 04:04 AM    CO2 28 01/21/2023 04:04 AM    BUN 35 01/21/2023 04:04 AM    LABALBU 3.7 01/21/2023 04:04 AM    CREATININE 5.8 01/21/2023 04:04 AM    CALCIUM 8.2 01/21/2023 04:04 AM    GFRAA 8 09/22/2022 12:14 PM    LABGLOM 8 01/21/2023 04:04 AM    GLUCOSE 316 01/21/2023 04:04 AM     Hepatic Function Panel:    Lab Results   Component Value Date/Time    ALKPHOS 82 01/21/2023 04:04 AM    ALT 9 01/21/2023 04:04 AM    AST 11 01/21/2023 04:04 AM    PROT 6.8 01/21/2023 04:04 AM    BILITOT 0.3 01/21/2023 04:04 AM    BILIDIR <0.2 01/21/2023 04:04 AM    IBILI see below 01/21/2023 04:04 AM    LABALBU 3.7 01/21/2023 04:04 AM     Ionized Calcium:  No results found for: IONCA  Magnesium:    Lab Results   Component Value Date/Time    MG 2.1 01/21/2023 04:04 AM     Phosphorus:    Lab Results   Component Value Date/Time    PHOS 5.3 01/21/2023 04:04 AM     LDH:    Lab Results   Component Value Date/Time     01/11/2022 12:45 PM     Uric Acid:    Lab Results   Component Value Date/Time    LABURIC 3.3 12/04/2022 05:30 AM     PT/INR:    Lab Results   Component Value Date/Time    PROTIME 15.8 01/18/2023 01:10 PM    INR 1.4 01/18/2023 01:10 PM     Warfarin PT/INR:  No components found for: PTPATWAR, PTINRWAR  PTT:    Lab Results   Component Value Date/Time    APTT 34.5 01/17/2023 08:35 AM   [APTT}  Troponin:  No results found for: TROPONINI  Last 3 Troponin:  No results found for: TROPONINI  U/A:    Lab Results   Component Value Date/Time    COLORU Yellow 11/18/2021 03:19 AM    PROTEINU >=300 11/18/2021 03:19 AM    PHUR 6.0 11/18/2021 03:19 AM    WBCUA 1-3 11/18/2021 03:19 AM    RBCUA 0-1 11/18/2021 03:19 AM    BACTERIA FEW 11/18/2021 03:19 AM    CLARITYU Clear 11/18/2021 03:19 AM    SPECGRAV 1.020 11/18/2021 03:19 AM    LEUKOCYTESUR Negative 11/18/2021 03:19 AM    UROBILINOGEN 0.2 11/18/2021 03:19 AM    BILIRUBINUR Negative 11/18/2021 03:19 AM    BLOODU SMALL 11/18/2021 03:19 AM    GLUCOSEU 500 11/18/2021 03:19 AM     ABG:  No results found for: PH, PCO2, PO2, HCO3, BE, THGB, TCO2, O2SAT  HgBA1c:    Lab Results Component Value Date/Time    LABA1C 11.9 01/17/2023 06:35 PM     FLP:    Lab Results   Component Value Date/Time    TRIG 173 01/18/2023 03:55 AM    HDL 41 01/18/2023 03:55 AM    LDLCALC 79 01/18/2023 03:55 AM    LABVLDL 35 01/18/2023 03:55 AM     TSH:    Lab Results   Component Value Date/Time    TSH 1.470 12/03/2022 12:09 PM     VITAMIN B12: No components found for: B12  FOLATE:  No results found for: FOLATE  IRON:    Lab Results   Component Value Date/Time    IRON 35 01/18/2023 03:55 AM     Iron Saturation:  No components found for: PERCENTFE  TIBC:    Lab Results   Component Value Date/Time    TIBC 173 01/18/2023 03:55 AM     FERRITIN:    Lab Results   Component Value Date/Time    FERRITIN 1,513 01/18/2023 03:55 AM          CBC:  Recent Labs     01/21/23  0404   WBC 8.3   RBC 2.86*   HGB 8.8*   HCT 27.7*      MCV 96.9   MCH 30.8   MCHC 31.8*   RDW 14.5   LYMPHOPCT 13.1*   MONOPCT 8.0   BASOPCT 0.6   MONOSABS 0.66   LYMPHSABS 1.09*   EOSABS 1.12*   BASOSABS 0.05          H & H :  Recent Labs     01/21/23  0404   HGB 8.8*       TSH:No results for input(s): TSH in the last 72 hours. GLUCOSE:No results for input(s): POCGLU in the last 72 hours. CMP:  Recent Labs     01/21/23  0404   *   K 4.2   CL 90*   CO2 28   BUN 35*   CREATININE 5.8*   LABGLOM 8   GLUCOSE 316*   PROT 6.8   LABALBU 3.7   CALCIUM 8.2*   BILITOT 0.3   ALKPHOS 82   AST 11   ALT 9         BNP:No results found for: BNP    PROTIME/INR:No results for input(s): PROTIME, INR in the last 72 hours. CRP: No results for input(s): CRP in the last 72 hours. ESR:No results for input(s): SEDRATE in the last 72 hours. LIPASE , AMYLASE:  Lab Results   Component Value Date    LIPASE 43 01/17/2023      No results found for: AMYLASE    ABGs: No results found for: PHART, PO2ART, [id number]    CARDIAC: No results for input(s): CKTOTAL, CKMB, CKMBINDEX, TROPONINI in the last 72 hours.     Lipid Profile:   Lab Results   Component Value Date/Time TRIG 173 01/18/2023 03:55 AM    HDL 41 01/18/2023 03:55 AM    LDLCALC 79 01/18/2023 03:55 AM    CHOL 155 01/18/2023 03:55 AM        US DUP LOWER EXTREMITIES BILATERAL VENOUS    Result Date: 1/19/2023  EXAMINATION: DUPLEX VENOUS ULTRASOUND OF THE BILATERAL LOWER EXTREMITIES1/19/2023 9:18 pm TECHNIQUE: Duplex ultrasound using B-mode/gray scaled imaging, Doppler spectral analysis and color flow Doppler was obtained of the deep venous structures of the lower bilateral extremities. COMPARISON: None. HISTORY: ORDERING SYSTEM PROVIDED HISTORY: Leg swelling, rule out DVT TECHNOLOGIST PROVIDED HISTORY: leg swelling, rule out DVT Reason for exam:->Leg swelling, rule out DVT What reading provider will be dictating this exam?->CRC FINDINGS: The visualized veins of the bilateral lower extremities are patent and free of echogenic thrombus. The veins demonstrate good compressibility with normal color flow study and spectral analysis. No evidence of DVT in either lower extremity. Discharge Exam:  See progress note from today    Discharge Medication List as of 1/21/2023  4:25 PM        START taking these medications    Details   glucose 4 g chewable tablet Take 4 tablets by mouth as needed for Low blood sugar, Disp-60 tablet, R-3Normal      insulin glargine (LANTUS) 100 UNIT/ML injection vial Inject 24 Units into the skin nightly, Disp-10 mL, R-3Normal      pantoprazole (PROTONIX) 40 MG tablet Take 1 tablet by mouth 2 times daily (before meals), Disp-60 tablet, R-4Normal           CONTINUE these medications which have NOT CHANGED    Details   rosuvastatin (CRESTOR) 5 MG tablet Take 1 tablet by mouth daily, Disp-30 tablet, R-3Normal      insulin lispro, 1 Unit Dial, (HUMALOG KWIKPEN) 100 UNIT/ML SOPN Inject 8 units with meals + following sliding scale.  -200 add 2U, -250 add 4U, -300 add 6U, -350 add 8U, -400 add 10U, BS over 400 add 12U, Disp-10 Adjustable Dose Pre-filled Pen Syringe, R-4Normal      acetaminophen (TYLENOL) 650 MG extended release tablet Take 1,300 mg by mouth every 8 hours as needed for PainHistorical Med      midodrine (PROAMATINE) 10 MG tablet Take 10 mg by mouth daily as needed (AT DIALYSIS FOR LOW BP)Historical Med      calcium acetate (PHOSLO) 667 MG CAPS capsule Take 667 mg by mouth 3 times daily (with meals)Historical Med           STOP taking these medications       apixaban (ELIQUIS) 5 MG TABS tablet Comments:   Reason for Stopping:         insulin glargine (LANTUS SOLOSTAR) 100 UNIT/ML injection pen Comments:   Reason for Stopping:               Time Spent on discharge is more than 30 minutes --      TIME  INCLUDES TIME THAT WAS  SPENT WITH DISCHARGE PAPERS, MEDICATION REVIEW, MEDICATION RECONCILIATION,   PRESCRIPTIONS, CHART REVIEW, PATIENT EXAM , FINAL PROGRESS NOTE, DISCUSSION OF FINDINGS WITH PATIENT AND AVAILABLE FAMILY , AND DICTATION  WHERE NEEDED ; Home Health Care [facility]) FORMS COMPLETED ; N-17  COMPLETION ;  H&P UPDATED ; DURABLE EQUIPMENT FORMS.      Active Hospital Problems    Diagnosis     End-stage renal disease [facility] hemod[facility] [N18.6, Z99.2]      Priority: Medium    Leg swelling [M79.89]      Priority: Medium    Lymphedema of both lower extremities [I89.0]      Priority: Medium    Hematemesis [K92.0]      Priority: Medium    H/O heart artery stent [Z95.5]      Priority: Medium    Acute pulmonary embolism ([facility] [I26.99]      Priority: Medium    Diabetes mellitus ([facility] [E11.9]      Priority: Medium    Pulmonary hypertension, unspecified ([facility] [I27.20]     Obesity, Class III, BMI 40-49.9 (morbid obesity) ([facility] [E66.01]     Diabetes mellitus type 2 with complications ([address] [Z16.5]     Essential hypertension [I10]        Signed:    Lourdes Espitia DO      1/23/2023    8:48 AM    Voice recognition software use for dictation

## 2023-01-23 NOTE — TELEPHONE ENCOUNTER
Called and spoke with the pharmacist Brent Caraballo. He states they need the patients information. Medicare part B. Not Covered under her medicare part A. They are trying to get ahold of the patient.

## 2023-01-23 NOTE — TELEPHONE ENCOUNTER
Care Transitions Initial Follow Up Call    Outreach made within 2 business days of discharge: Yes    Patient: Emily Mcdonald Patient : 1955   MRN: [id number]  Reason for Admission: There are no discharge diagnoses documented for the most recent discharge. Discharge Date: 23       Spoke with: Spoke with patient. Discharge department/facility:     [facility] Interactive Patient Contact:  Was patient able to fill all prescriptions: Yes  Was patient instructed to bring all medications to the follow-up visit: Yes  Is patient taking all medications as directed in the discharge summary?  Yes  Does patient understand their discharge instructions: Yes  Does patient have questions or concerns that need addressed prior to 7-14 day follow up office visit: no    Scheduled appointment with PCP within 7-14 days    Follow Up  Future Appointments   Date Time Provider Surinder Pastrana   2023 11:00 AM Batsheva Moctezuma MD [facility]   2023  3:00  [address] [place] [address], [facility]N [facility]   4/3/2023  9:00 AM [facility] [place]

## 2023-01-30 ENCOUNTER — HOSPITAL ENCOUNTER (INPATIENT)
Age: 68
LOS: 2 days | Discharge: HOME | DRG: 377 | End: 2023-02-01
Attending: EMERGENCY MEDICINE | Admitting: INTERNAL MEDICINE
Payer: MEDICARE

## 2023-01-30 ENCOUNTER — APPOINTMENT (OUTPATIENT)
Dept: CT IMAGING | Age: 68
DRG: 377 | End: 2023-01-30
Payer: MEDICARE

## 2023-01-30 ENCOUNTER — OFFICE VISIT (OUTPATIENT)
Dept: PRIMARY CARE CLINIC | Age: 68
End: 2023-01-30

## 2023-01-30 VITALS
RESPIRATION RATE: 18 BRPM | HEIGHT: 64 IN | DIASTOLIC BLOOD PRESSURE: 64 MMHG | SYSTOLIC BLOOD PRESSURE: 138 MMHG | WEIGHT: 262 LBS | TEMPERATURE: 96.6 F | HEART RATE: 80 BPM | OXYGEN SATURATION: 92 % | BODY MASS INDEX: 44.73 KG/M2

## 2023-01-30 DIAGNOSIS — Z09 HOSPITAL DISCHARGE FOLLOW-UP: Primary | ICD-10-CM

## 2023-01-30 DIAGNOSIS — R10.9 FLANK PAIN, ACUTE: ICD-10-CM

## 2023-01-30 DIAGNOSIS — R10.11 RIGHT UPPER QUADRANT ABDOMINAL PAIN: ICD-10-CM

## 2023-01-30 DIAGNOSIS — N18.6 ESRD ON HEMODIALYSIS (HCC): ICD-10-CM

## 2023-01-30 DIAGNOSIS — Z99.2 ESRD ON HEMODIALYSIS (HCC): ICD-10-CM

## 2023-01-30 DIAGNOSIS — N18.6 END STAGE KIDNEY DISEASE (HCC): ICD-10-CM

## 2023-01-30 DIAGNOSIS — K62.5 BRIGHT RED RECTAL BLEEDING: ICD-10-CM

## 2023-01-30 DIAGNOSIS — K92.2 LOWER GI BLEED: Primary | ICD-10-CM

## 2023-01-30 DIAGNOSIS — E11.8 DIABETES MELLITUS TYPE 2 WITH COMPLICATIONS (HCC): ICD-10-CM

## 2023-01-30 LAB
ABO/RH: NORMAL
ANION GAP SERPL CALCULATED.3IONS-SCNC: 12 MMOL/L (ref 7–16)
ANTIBODY SCREEN: NORMAL
BUN BLDV-MCNC: 30 MG/DL (ref 6–23)
CALCIUM SERPL-MCNC: 8.5 MG/DL (ref 8.6–10.2)
CHLORIDE BLD-SCNC: 95 MMOL/L (ref 98–107)
CO2: 29 MMOL/L (ref 22–29)
CREAT SERPL-MCNC: 5.6 MG/DL (ref 0.5–1)
GFR SERPL CREATININE-BSD FRML MDRD: 8 ML/MIN/1.73
GLUCOSE BLD-MCNC: 383 MG/DL (ref 74–99)
HCT VFR BLD CALC: 27.7 % (ref 34–48)
HCT VFR BLD CALC: 28.3 % (ref 34–48)
HEMOGLOBIN: 8.7 G/DL (ref 11.5–15.5)
HEMOGLOBIN: 8.9 G/DL (ref 11.5–15.5)
MCH RBC QN AUTO: 31 PG (ref 26–35)
MCHC RBC AUTO-ENTMCNC: 31.4 % (ref 32–34.5)
MCV RBC AUTO: 98.6 FL (ref 80–99.9)
METER GLUCOSE: 353 MG/DL (ref 74–99)
PDW BLD-RTO: 14.7 FL (ref 11.5–15)
PLATELET # BLD: 139 E9/L (ref 130–450)
PMV BLD AUTO: 10.7 FL (ref 7–12)
POTASSIUM SERPL-SCNC: 4.3 MMOL/L (ref 3.5–5)
RBC # BLD: 2.87 E12/L (ref 3.5–5.5)
SODIUM BLD-SCNC: 136 MMOL/L (ref 132–146)
WBC # BLD: 10 E9/L (ref 4.5–11.5)

## 2023-01-30 PROCEDURE — 85014 HEMATOCRIT: CPT

## 2023-01-30 PROCEDURE — 85018 HEMOGLOBIN: CPT

## 2023-01-30 PROCEDURE — 86850 RBC ANTIBODY SCREEN: CPT

## 2023-01-30 PROCEDURE — 86901 BLOOD TYPING SEROLOGIC RH(D): CPT

## 2023-01-30 PROCEDURE — 99285 EMERGENCY DEPT VISIT HI MDM: CPT

## 2023-01-30 PROCEDURE — 2060000000 HC ICU INTERMEDIATE R&B

## 2023-01-30 PROCEDURE — 2580000003 HC RX 258: Performed by: STUDENT IN AN ORGANIZED HEALTH CARE EDUCATION/TRAINING PROGRAM

## 2023-01-30 PROCEDURE — 86900 BLOOD TYPING SEROLOGIC ABO: CPT

## 2023-01-30 PROCEDURE — 82962 GLUCOSE BLOOD TEST: CPT

## 2023-01-30 PROCEDURE — 85027 COMPLETE CBC AUTOMATED: CPT

## 2023-01-30 PROCEDURE — C9113 INJ PANTOPRAZOLE SODIUM, VIA: HCPCS | Performed by: STUDENT IN AN ORGANIZED HEALTH CARE EDUCATION/TRAINING PROGRAM

## 2023-01-30 PROCEDURE — 6370000000 HC RX 637 (ALT 250 FOR IP): Performed by: INTERNAL MEDICINE

## 2023-01-30 PROCEDURE — 36415 COLL VENOUS BLD VENIPUNCTURE: CPT

## 2023-01-30 PROCEDURE — 80048 BASIC METABOLIC PNL TOTAL CA: CPT

## 2023-01-30 PROCEDURE — 93005 ELECTROCARDIOGRAM TRACING: CPT | Performed by: EMERGENCY MEDICINE

## 2023-01-30 PROCEDURE — 6360000002 HC RX W HCPCS: Performed by: STUDENT IN AN ORGANIZED HEALTH CARE EDUCATION/TRAINING PROGRAM

## 2023-01-30 PROCEDURE — 6360000004 HC RX CONTRAST MEDICATION: Performed by: RADIOLOGY

## 2023-01-30 PROCEDURE — 74177 CT ABD & PELVIS W/CONTRAST: CPT

## 2023-01-30 RX ORDER — ONDANSETRON 4 MG/1
4 TABLET, ORALLY DISINTEGRATING ORAL EVERY 8 HOURS PRN
Status: DISCONTINUED | OUTPATIENT
Start: 2023-01-30 | End: 2023-02-01 | Stop reason: HOSPADM

## 2023-01-30 RX ORDER — INSULIN LISPRO 100 [IU]/ML
0-4 INJECTION, SOLUTION INTRAVENOUS; SUBCUTANEOUS
Status: DISCONTINUED | OUTPATIENT
Start: 2023-01-30 | End: 2023-01-31

## 2023-01-30 RX ORDER — INSULIN LISPRO 100 [IU]/ML
0-4 INJECTION, SOLUTION INTRAVENOUS; SUBCUTANEOUS NIGHTLY
Status: DISCONTINUED | OUTPATIENT
Start: 2023-01-30 | End: 2023-01-31

## 2023-01-30 RX ORDER — SENNOSIDES 8.6 MG
1300 CAPSULE ORAL EVERY 8 HOURS PRN
Status: DISCONTINUED | OUTPATIENT
Start: 2023-01-30 | End: 2023-01-30 | Stop reason: SDUPTHER

## 2023-01-30 RX ORDER — MIDODRINE HYDROCHLORIDE 5 MG/1
10 TABLET ORAL DAILY PRN
Status: DISCONTINUED | OUTPATIENT
Start: 2023-01-30 | End: 2023-02-01 | Stop reason: HOSPADM

## 2023-01-30 RX ORDER — CALCIUM ACETATE 667 MG/1
667 CAPSULE ORAL
Status: DISCONTINUED | OUTPATIENT
Start: 2023-01-30 | End: 2023-02-01 | Stop reason: HOSPADM

## 2023-01-30 RX ORDER — ACETAMINOPHEN 325 MG/1
650 TABLET ORAL EVERY 6 HOURS PRN
Status: DISCONTINUED | OUTPATIENT
Start: 2023-01-30 | End: 2023-02-01 | Stop reason: HOSPADM

## 2023-01-30 RX ORDER — ROSUVASTATIN CALCIUM 5 MG/1
5 TABLET, COATED ORAL DAILY
Status: DISCONTINUED | OUTPATIENT
Start: 2023-01-31 | End: 2023-02-01 | Stop reason: HOSPADM

## 2023-01-30 RX ORDER — SODIUM CHLORIDE 9 MG/ML
INJECTION, SOLUTION INTRAVENOUS CONTINUOUS
Status: DISCONTINUED | OUTPATIENT
Start: 2023-01-30 | End: 2023-01-31

## 2023-01-30 RX ORDER — PANTOPRAZOLE SODIUM 40 MG/10ML
40 INJECTION, POWDER, LYOPHILIZED, FOR SOLUTION INTRAVENOUS 2 TIMES DAILY
Status: DISCONTINUED | OUTPATIENT
Start: 2023-01-30 | End: 2023-02-01 | Stop reason: HOSPADM

## 2023-01-30 RX ORDER — SODIUM CHLORIDE 0.9 % (FLUSH) 0.9 %
5-40 SYRINGE (ML) INJECTION PRN
Status: DISCONTINUED | OUTPATIENT
Start: 2023-01-30 | End: 2023-02-01 | Stop reason: HOSPADM

## 2023-01-30 RX ORDER — SODIUM CHLORIDE 9 MG/ML
INJECTION, SOLUTION INTRAVENOUS PRN
Status: DISCONTINUED | OUTPATIENT
Start: 2023-01-30 | End: 2023-02-01 | Stop reason: HOSPADM

## 2023-01-30 RX ORDER — ACETAMINOPHEN 650 MG/1
650 SUPPOSITORY RECTAL EVERY 6 HOURS PRN
Status: DISCONTINUED | OUTPATIENT
Start: 2023-01-30 | End: 2023-02-01 | Stop reason: HOSPADM

## 2023-01-30 RX ORDER — SODIUM CHLORIDE 0.9 % (FLUSH) 0.9 %
5-40 SYRINGE (ML) INJECTION EVERY 12 HOURS SCHEDULED
Status: DISCONTINUED | OUTPATIENT
Start: 2023-01-30 | End: 2023-02-01 | Stop reason: HOSPADM

## 2023-01-30 RX ORDER — ONDANSETRON 2 MG/ML
4 INJECTION INTRAMUSCULAR; INTRAVENOUS EVERY 6 HOURS PRN
Status: DISCONTINUED | OUTPATIENT
Start: 2023-01-30 | End: 2023-02-01 | Stop reason: HOSPADM

## 2023-01-30 RX ORDER — POLYETHYLENE GLYCOL 3350 17 G/17G
17 POWDER, FOR SOLUTION ORAL DAILY PRN
Status: DISCONTINUED | OUTPATIENT
Start: 2023-01-30 | End: 2023-02-01 | Stop reason: HOSPADM

## 2023-01-30 RX ORDER — INSULIN GLARGINE 100 [IU]/ML
24 INJECTION, SOLUTION SUBCUTANEOUS NIGHTLY
Status: DISCONTINUED | OUTPATIENT
Start: 2023-01-30 | End: 2023-01-31

## 2023-01-30 RX ORDER — DEXTROSE MONOHYDRATE 100 MG/ML
INJECTION, SOLUTION INTRAVENOUS CONTINUOUS PRN
Status: DISCONTINUED | OUTPATIENT
Start: 2023-01-30 | End: 2023-02-01 | Stop reason: HOSPADM

## 2023-01-30 RX ADMIN — SODIUM CHLORIDE, PRESERVATIVE FREE 10 ML: 5 INJECTION INTRAVENOUS at 20:09

## 2023-01-30 RX ADMIN — INSULIN GLARGINE 24 UNITS: 100 INJECTION, SOLUTION SUBCUTANEOUS at 20:11

## 2023-01-30 RX ADMIN — INSULIN LISPRO 4 UNITS: 100 INJECTION, SOLUTION INTRAVENOUS; SUBCUTANEOUS at 20:10

## 2023-01-30 RX ADMIN — IOPAMIDOL 75 ML: 755 INJECTION, SOLUTION INTRAVENOUS at 14:37

## 2023-01-30 RX ADMIN — PANTOPRAZOLE SODIUM 40 MG: 40 INJECTION, POWDER, FOR SOLUTION INTRAVENOUS at 20:09

## 2023-01-30 SDOH — ECONOMIC STABILITY: FOOD INSECURITY: WITHIN THE PAST 12 MONTHS, THE FOOD YOU BOUGHT JUST DIDN'T LAST AND YOU DIDN'T HAVE MONEY TO GET MORE.: NEVER TRUE

## 2023-01-30 SDOH — ECONOMIC STABILITY: FOOD INSECURITY: WITHIN THE PAST 12 MONTHS, YOU WORRIED THAT YOUR FOOD WOULD RUN OUT BEFORE YOU GOT MONEY TO BUY MORE.: NEVER TRUE

## 2023-01-30 ASSESSMENT — PATIENT HEALTH QUESTIONNAIRE - PHQ9
6. FEELING BAD ABOUT YOURSELF - OR THAT YOU ARE A FAILURE OR HAVE LET YOURSELF OR YOUR FAMILY DOWN: 0
8. MOVING OR SPEAKING SO SLOWLY THAT OTHER PEOPLE COULD HAVE NOTICED. OR THE OPPOSITE, BEING SO FIGETY OR RESTLESS THAT YOU HAVE BEEN MOVING AROUND A LOT MORE THAN USUAL: 1
SUM OF ALL RESPONSES TO PHQ QUESTIONS 1-9: 5
1. LITTLE INTEREST OR PLEASURE IN DOING THINGS: 0
7. TROUBLE CONCENTRATING ON THINGS, SUCH AS READING THE NEWSPAPER OR WATCHING TELEVISION: 0
SUM OF ALL RESPONSES TO PHQ9 QUESTIONS 1 & 2: 0
9. THOUGHTS THAT YOU WOULD BE BETTER OFF DEAD, OR OF HURTING YOURSELF: 0
SUM OF ALL RESPONSES TO PHQ QUESTIONS 1-9: 5
SUM OF ALL RESPONSES TO PHQ QUESTIONS 1-9: 5
4. FEELING TIRED OR HAVING LITTLE ENERGY: 1
2. FEELING DOWN, DEPRESSED OR HOPELESS: 0
3. TROUBLE FALLING OR STAYING ASLEEP: 3
5. POOR APPETITE OR OVEREATING: 0
10. IF YOU CHECKED OFF ANY PROBLEMS, HOW DIFFICULT HAVE THESE PROBLEMS MADE IT FOR YOU TO DO YOUR WORK, TAKE CARE OF THINGS AT HOME, OR GET ALONG WITH OTHER PEOPLE: 0
SUM OF ALL RESPONSES TO PHQ QUESTIONS 1-9: 5

## 2023-01-30 ASSESSMENT — PAIN DESCRIPTION - LOCATION
LOCATION: RIB CAGE
LOCATION: ABDOMEN

## 2023-01-30 ASSESSMENT — PAIN DESCRIPTION - FREQUENCY
FREQUENCY: CONTINUOUS
FREQUENCY: CONTINUOUS

## 2023-01-30 ASSESSMENT — ENCOUNTER SYMPTOMS
SHORTNESS OF BREATH: 0
NAUSEA: 1
ABDOMINAL PAIN: 0
BLOOD IN STOOL: 0
DIARRHEA: 0
CONSTIPATION: 0
COLOR CHANGE: 0
VOMITING: 0
ANAL BLEEDING: 1
ABDOMINAL DISTENTION: 0

## 2023-01-30 ASSESSMENT — PAIN DESCRIPTION - ORIENTATION
ORIENTATION: RIGHT
ORIENTATION: RIGHT

## 2023-01-30 ASSESSMENT — PAIN - FUNCTIONAL ASSESSMENT
PAIN_FUNCTIONAL_ASSESSMENT: 0-10
PAIN_FUNCTIONAL_ASSESSMENT: 0-10

## 2023-01-30 ASSESSMENT — PAIN DESCRIPTION - PAIN TYPE
TYPE: ACUTE PAIN
TYPE: ACUTE PAIN

## 2023-01-30 ASSESSMENT — LIFESTYLE VARIABLES
HOW OFTEN DO YOU HAVE A DRINK CONTAINING ALCOHOL: NEVER
HOW MANY STANDARD DRINKS CONTAINING ALCOHOL DO YOU HAVE ON A TYPICAL DAY: PATIENT DOES NOT DRINK

## 2023-01-30 ASSESSMENT — PAIN SCALES - GENERAL
PAINLEVEL_OUTOF10: 0
PAINLEVEL_OUTOF10: 0
PAINLEVEL_OUTOF10: 6

## 2023-01-30 ASSESSMENT — PAIN DESCRIPTION - ONSET
ONSET: ON-GOING
ONSET: ON-GOING

## 2023-01-30 ASSESSMENT — PAIN DESCRIPTION - DESCRIPTORS: DESCRIPTORS: DISCOMFORT

## 2023-01-30 ASSESSMENT — SOCIAL DETERMINANTS OF HEALTH (SDOH): HOW HARD IS IT FOR YOU TO PAY FOR THE VERY BASICS LIKE FOOD, HOUSING, MEDICAL CARE, AND HEATING?: NOT HARD AT ALL

## 2023-01-30 NOTE — PROGRESS NOTES
Database initiated. Patient is A&O from home with daughter. States she uses a walker and wears 2 liters through [employer]. She has a wheelchair if she has to go far distances.  She is a NO LEFT ARM d/t fistula

## 2023-01-30 NOTE — ED NOTES
ED to Inpatient Handoff Report    Notified Yusef Genao that electronic handoff available and patient ready for transport to room 611. Safety Risks: Risk of falls    Patient in Restraints: no    Constant Observer or Patient : no    Telemetry Monitoring Ordered :Yes           Order to transfer to unit without monitor:YES    Last MEWS: 1 Time completed: 1630    Vitals:    01/30/23 1405 01/30/23 1445 01/30/23 1534 01/30/23 1630   BP: (!) 147/63 (!) 150/59 (!) 145/101 (!) 142/59   Pulse: 95 74 73 77   Resp: 19 19 20 19   Temp:    98 °F (36.7 °C)   TempSrc:    Oral   SpO2: 97% 95% 97% 97%   Weight:       Height:           Opportunity for questions and clarification was provided.          Alyce Mckeon RN  01/30/23 0473

## 2023-01-30 NOTE — PROGRESS NOTES
Post-Discharge Transitional Care  Follow Up      Dino Leiva   YOB: 1955    Date of Office Visit:  1/30/2023  Date of Hospital Admission: 1/17/23  Date of Hospital Discharge: 1/21/23  Risk of hospital readmission (high >=14%. Medium >=10%) :Readmission Risk Score: 20.1      Care management risk score Rising risk (score 2-5) and Complex Care (Scores >=6): No Risk Score On File     Non face to face  following discharge, date last encounter closed (first attempt may have been earlier): *No documented post hospital discharge outreach found in the last 14 days    Call initiated 2 business days of discharge: *No response recorded in the last 14 days    ASSESSMENT/PLAN:   Hospital discharge follow-up  -     UT DISCHARGE MEDS RECONCILED W/ CURRENT OUTPATIENT MED LIST  Bright red rectal bleeding  Comments:  Patient is not on anticoagulation at this point, last colonoscopy 2017 out of town, will require repeat and will continue off anticoagulation. Right upper quadrant abdominal pain  Comments:  Need a stat CT scan discussed with the emergency room physician who will order, rule out acute abdominal process cholecystitis/hydronephrosis/acute colitis. Flank pain, acute  End stage kidney disease ([facility] [address]  Diabetes mellitus type 2 with complications ([facility] [address]  ESRD on hemodialysis [facility])    Medical Decision Making: high complexity  No follow-ups on file.            Subjective:   HPI:  Follow up of Hospital problems/diagnosis(es): Patient was admitted to the hospital January 17 due to hematemesis, patient has underlying history of pulmonary embolus and patient was on Eliquis, patient was evaluated at the hospital she was taken off anticoagulation and she had an IVC filter placed January 20  Patient had an EGD in December 2022 and that showed duodenitis/peptic duodenitis with negative BRANDIE  Patient also is diabetic and has end-stage kidney disease and is on hemodialysis since March 2021    Inpatient course: Discharge summary reviewed- see chart. Interval history/Current status: The patient states that she developed severe pain today in the abdomen upper right abdomen and the right upper back it is hard to take a deep breath, she also had 2 bouts of rectal bleeding 1 Saturday and 1 this morning. It hurts to take a deep breath    Patient Active Problem List   Diagnosis    Nausea & vomiting    Essential hypertension    Hyperlipidemia    Diabetes mellitus type 2 with complications (HCC)    Neck pain    Anemia in CKD (chronic kidney disease)    Pneumonia due to COVID-19 virus    Pulmonary hypertension, unspecified (HCC)    Obesity, Class III, BMI 40-49.9 (morbid obesity) ([facility] [address]    History of Clostridioides difficile colitis    Anxiety and depression    At high risk for falls    Dizziness on standing    Acute pulmonary embolism ([facility])    H/O heart artery stent    Pulmonary embolism and infarction ([facility])    Diabetes mellitus ([facility] [address]    Hematemesis    End-stage renal disease on hemodialysis ([facility])    Leg swelling    Lymphedema of both lower extremities       Medications listed as ordered at the time of discharge from hospital     Medication List            Accurate as of January 30, 2023 11:13 AM. If you have any questions, ask your nurse or doctor. CONTINUE taking these medications      acetaminophen 650 MG extended release tablet  Commonly known as: TYLENOL     blood glucose test strips  Test 3 times a day. Dispense sufficient amount for indicated testing frequency plus additional to accommodate PRN testing needs.      calcium acetate 667 MG Caps capsule  Commonly known as: PHOSLO     glucose 4 g chewable tablet  Take 4 tablets by mouth as needed for Low blood sugar     * Blood Glucose Monitor System w/Device Kit     * glucose monitoring kit  1 kit by Does not apply route daily     insulin glargine 100 UNIT/ML injection vial  Commonly known as: LANTUS  Inject 24 Units into the skin nightly     insulin lispro (1 Unit Dial) 100 UNIT/ML Sopn  Commonly known as: HumaLOG KwikPen  Inject 8 units with meals + following sliding scale. -200 add 2U, -250 add 4U, -300 add 6U, -350 add 8U, -400 add 10U, BS over 400 add 12U     Lancets 30G Misc  1 each by Does not apply route daily     midodrine 10 MG tablet  Commonly known as: PROAMATINE     pantoprazole 40 MG tablet  Commonly known as: PROTONIX  Take 1 tablet by mouth 2 times daily (before meals)     rosuvastatin 5 MG tablet  Commonly known as: CRESTOR  Take 1 tablet by mouth daily           * This list has 2 medication(s) that are the same as other medications prescribed for you. Read the directions carefully, and ask your doctor or other care provider to review them with you. Medications marked \"taking\" at this time  Outpatient Medications Marked as Taking for the 1/30/23 encounter (Office Visit) with Elisa Tovar MD   Medication Sig Dispense Refill    Blood Glucose Monitoring Suppl (BLOOD GLUCOSE MONITOR SYSTEM) w/Device KIT by Does not apply route      glucose 4 g chewable tablet Take 4 tablets by mouth as needed for Low blood sugar 60 tablet 3    pantoprazole (PROTONIX) 40 MG tablet Take 1 tablet by mouth 2 times daily (before meals) 60 tablet 4    glucose monitoring (FREESTYLE FREEDOM) kit 1 kit by Does not apply route daily 1 kit 0    Lancets 30G MISC 1 each by Does not apply route daily 100 each 3    blood glucose monitor strips Test 3 times a day. Dispense sufficient amount for indicated testing frequency plus additional to accommodate PRN testing needs.  300 strip 0    rosuvastatin (CRESTOR) 5 MG tablet Take 1 tablet by mouth daily 30 tablet 3    acetaminophen (TYLENOL) 650 MG extended release tablet Take 1,300 mg by mouth every 8 hours as needed for Pain      midodrine (PROAMATINE) 10 MG tablet Take 10 mg by mouth daily as needed (AT DIALYSIS FOR LOW BP)      calcium acetate (PHOSLO) 667 MG CAPS capsule Take 667 mg by mouth 3 times daily (with meals)          Medications patient taking as of now reconciled against medications ordered at time of hospital discharge: Yes    A comprehensive review of systems was negative except for what was noted in the HPI. Objective:    /64   Pulse 80   Temp (!) 96.6 °F (35.9 °C)   Resp 18   Ht 5' 4\" (1.626 m)   Wt 262 lb (118.8 kg)   SpO2 92%   BMI 44.97 kg/m²   General Appearance: alert and oriented to person, place and time, well developed and well- nourished, in no acute distress  Skin: warm and dry, no rash or erythema  Head: normocephalic and atraumatic  Eyes: pupils equal, round, and reactive to light, extraocular eye movements intact, conjunctivae normal  ENT: tympanic membrane, external ear and ear canal normal bilaterally, nose without deformity, nasal mucosa and turbinates normal without polyps  Neck: supple and non-tender without mass, no thyromegaly or thyroid nodules, no cervical lymphadenopathy  Pulmonary/Chest: clear to auscultation bilaterally- no wheezes, rales or rhonchi, normal air movement, no respiratory distress  Cardiovascular: normal rate, regular rhythm, normal S1 and S2, no murmurs, rubs, clicks, or gallops, distal pulses intact, no carotid bruits  Abdomen: soft, non-tender, non-distended, normal bowel sounds, no masses or organomegaly  Extremities: no cyanosis, clubbing or edema  Musculoskeletal: normal range of motion, no joint swelling, deformity or tenderness  Neurologic: reflexes normal and symmetric, no cranial nerve deficit, gait, coordination and speech normal      An electronic signature was used to authenticate this note.   --Alvin Joyce MD

## 2023-01-30 NOTE — ED PROVIDER NOTES
HPI:  1/30/23,   Time: 11:32 AM DANIAL Plaza is a 78 y.o. female presenting to the ED for rectal bleeding BRBPR, beginning 2 days  ago. The complaint has been persistent, moderate in severity, and worsened by nothing. Patient was sent in by her PCP Dr. Beata Berrios concern for bright red blood per rectum x2 with bowel movements. Patient was recently in the hospital found to have pulmonary embolism. She had a Leonila filter placed because of recent diagnosis of bleeding duodenal ulcer. Patient is on no anticoagulation currently. She complains of right upper right flank pain. Patient is hypoxic she is on 2 L nasal cannula continuously. She takes hemodialysis Tuesday Thursday Saturday. Her last dialysis was this past Saturday. Complains of fatigue. Nephrology is Dr. Miroslava Olea      Review of Systems:   Pertinent positives and negatives are stated within HPI, all other systems reviewed and are negative.    --------------------------------------------- PAST HISTORY ---------------------------------------------  Past Medical History:  has a past medical history of Acute pulmonary embolism ([address] Anemia in CKD (chronic kidney disease), Anxiety and depression, At high risk for falls, Brain aneurysm, CLABSI (central line-associated bloodstream infection), Cough, Diabetes mellitus ([address] Diabetes mellitus type 2 with complications ([address] Dizziness on standing, End-stage renal disease on hemodialysis ([address] ESRD (end stage renal disease) ([address] ESRD on hemodialysis ([address] Essential hypertension, Fever, unspecified, Gastrointestinal hemorrhage, H/O heart artery stent, History of Clostridioides difficile colitis, Hyperlipidemia, Hypertension, Leg swelling, Lymphedema of both lower extremities, MSSA bacteremia, Nausea & vomiting, Obesity, Class III, BMI 40-49.9 (morbid obesity) ([address] Periumbilical abdominal pain, and S/P insertion of inferior vena caval filter.     Past Surgical History:  has a past surgical history that includes Cardiac surgery;  section; vascular surgery (N/A, 2021); Upper gastrointestinal endoscopy (N/A, 2021); Dialysis fistula creation (Left, 2022); Colonoscopy (2017); Cardiac catheterization (2015); Percutaneous Transluminal Coronary Angio (2015); and Upper gastrointestinal endoscopy (N/A, 2023). Social History:  reports that she quit smoking about 37 years ago. Her smoking use included cigarettes. She started smoking about 49 years ago. She has a 12.00 pack-year smoking history. She has never used smokeless tobacco. She reports that she does not drink alcohol and does not use drugs. Family History: family history includes Coronary Art Dis in her brother, father, and mother. The patients home medications have been reviewed. Allergies: Pcn [penicillins], Benzonatate, Morphine, and Sodium hypochlorite    ---------------------------------------------------PHYSICAL EXAM--------------------------------------    Constitutional/General: Alert and oriented x3, well appearing, non toxic in NAD  Head: Normocephalic and atraumatic  Eyes: PERRL, EOMI, conjunctive normal, sclera non icteric  Mouth: Oropharynx clear, handling secretions, no trismus, no asymmetry of the posterior oropharynx or uvular edema, mm dry  Neck: Supple, full ROM, non tender to palpation in the midline, no stridor, no crepitus, no meningeal signs  Respiratory: Lungs clear to auscultation bilaterally, no wheezes, rales, or rhonchi. Not in respiratory distress  Cardiovascular:  Regular rate. Regular rhythm. No murmurs, gallops, or rubs. 2+ distal pulses  Chest: No chest wall tenderness  GI:  Abdomen Soft, Non tender, Non distended. +BS. No organomegaly, no palpable masses,  No rebound, guarding, or rigidity. Musculoskeletal: Moves all extremities x 4. Warm and well perfused, no clubbing, cyanosis, or edema. Capillary refill <3 seconds  Integument: skin warm and dry.   Venous stasis ulcers of the legs not infected  Lymphatic: no lymphadenopathy noted  Neurologic: GCS 15, no focal deficits, symmetric strength 5/5 in the upper and lower extremities bilaterally  Psychiatric: Normal Affect    -------------------------------------------------- RESULTS -------------------------------------------------  I have personally reviewed all laboratory and imaging results for this patient. Results are listed below. LABS:  Results for orders placed or performed during the hospital encounter of 01/30/23   CBC   Result Value Ref Range    WBC 10.0 4.5 - 11.5 E9/L    RBC 2.87 (L) 3.50 - 5.50 E12/L    Hemoglobin 8.9 (L) 11.5 - 15.5 g/dL    Hematocrit 28.3 (L) 34.0 - 48.0 %    MCV 98.6 80.0 - 99.9 fL    MCH 31.0 26.0 - 35.0 pg    MCHC 31.4 (L) 32.0 - 34.5 %    RDW 14.7 11.5 - 15.0 fL    Platelets 565 399 - 265 E9/L    MPV 10.7 7.0 - 12.0 fL   Basic Metabolic Panel   Result Value Ref Range    Sodium 136 132 - 146 mmol/L    Potassium 4.3 3.5 - 5.0 mmol/L    Chloride 95 (L) 98 - 107 mmol/L    CO2 29 22 - 29 mmol/L    Anion Gap 12 7 - 16 mmol/L    Glucose 383 (H) 74 - 99 mg/dL    BUN 30 (H) 6 - 23 mg/dL    Creatinine 5.6 (H) 0.5 - 1.0 mg/dL    Est, Glom Filt Rate 8 >=60 mL/min/1.73    Calcium 8.5 (L) 8.6 - 10.2 mg/dL   Hemoglobin and Hematocrit   Result Value Ref Range    Hemoglobin 8.7 (L) 11.5 - 15.5 g/dL    Hematocrit 27.7 (L) 34.0 - 48.0 %   POCT Glucose   Result Value Ref Range    Meter Glucose 353 (H) 74 - 99 mg/dL   EKG 12 Lead   Result Value Ref Range    Ventricular Rate 97 BPM    Atrial Rate 97 BPM    P-R Interval 192 ms    QRS Duration 96 ms    Q-T Interval 388 ms    QTc Calculation (Bazett) 492 ms    P Axis 37 degrees    R Axis -13 degrees    T Axis 103 degrees   TYPE AND SCREEN   Result Value Ref Range    ABO/Rh B NEG     Antibody Screen NEG        RADIOLOGY:  Interpreted by Radiologist.  CT ABDOMEN PELVIS W IV CONTRAST Additional Contrast? None   Final Result   1.   No acute abnormality is seen in the abdomen or the pelvis. 2. Minimal distal colonic diverticulosis without diverticulitis. 3. Prominent fat containing umbilical hernia. No evidence of fat   strangulation. 4. Cholelithiasis. 5. Small right pleural effusion. EKG:  This EKG is signed and interpreted by the EP. Time: 1159  Rate: 97  Rhythm: Sinus  Interpretation: SR with PVCs and prolonged qtc 492 ms  Comparison: stable as compared to patient's most recent EKG      ------------------------- NURSING NOTES AND VITALS REVIEWED ---------------------------   The nursing notes within the ED encounter and vital signs as below have been reviewed by myself. BP (!) 158/69   Pulse 76   Temp 98.1 °F (36.7 °C) (Oral)   Resp 16   Ht 5' 4\" (1.626 m)   Wt 262 lb (118.8 kg)   SpO2 97%   BMI 44.97 kg/m²   Oxygen Saturation Interpretation: Abnormal - but at baseline    The patients available past medical records and past encounters were reviewed.         ------------------------------ ED COURSE/MEDICAL DECISION MAKING----------------------  Medications   sodium chloride flush 0.9 % injection 5-40 mL (10 mLs IntraVENous Given 1/30/23 2009)   sodium chloride flush 0.9 % injection 5-40 mL (has no administration in time range)   0.9 % sodium chloride infusion (has no administration in time range)   ondansetron (ZOFRAN-ODT) disintegrating tablet 4 mg (has no administration in time range)     Or   ondansetron (ZOFRAN) injection 4 mg (has no administration in time range)   polyethylene glycol (GLYCOLAX) packet 17 g (has no administration in time range)   acetaminophen (TYLENOL) tablet 650 mg (650 mg Oral Given 1/31/23 0050)     Or   acetaminophen (TYLENOL) suppository 650 mg ( Rectal See Alternative 1/31/23 0050)   pantoprazole (PROTONIX) injection 40 mg (40 mg IntraVENous Given 1/30/23 2009)   calcium acetate (PHOSLO) capsule 667 mg (667 mg Oral Not Given 1/30/23 2115)   insulin glargine (LANTUS) injection vial 24 Units (24 Units SubCUTAneous Given 23)   midodrine (PROAMATINE) tablet 10 mg (has no administration in time range)   rosuvastatin (CRESTOR) tablet 5 mg (has no administration in time range)   0.9 % sodium chloride infusion ( IntraVENous Not Given 23)   insulin lispro (HUMALOG) injection vial 0-4 Units (0 Units SubCUTAneous Not Given 23)   insulin lispro (HUMALOG) injection vial 0-4 Units (4 Units SubCUTAneous Given 23)   glucose chewable tablet 16 g (has no administration in time range)   dextrose bolus 10% 125 mL (has no administration in time range)     Or   dextrose bolus 10% 250 mL (has no administration in time range)   glucagon (rDNA) injection 1 mg (has no administration in time range)   dextrose 10 % infusion (has no administration in time range)   iopamidol (ISOVUE-370) 76 % injection 75 mL (75 mLs IntraVENous Given 23 143)             Medical Decision Makin yo F wih recent diagnosis of bleeding ulcers in duodenum pulmonary emboli. Had a Leonila filter placed. Unable to be anticoagulation presents with bright red blood per rectum x2 with bowel movements. And right upper right flank pain. History From: Patient    CC/HPI Summary, DDx, ED Course, Reassessment, Tests Considered, Patient expectation:   DDX: Lower GI bleed, coloonic mass, bleeding disorder. CBC, bmp Type and screen ordered. Hemoglobin is low but at baseline. BMP stable. CT with contrast of the abdomen: Minimal distal colonic diverticulosis. Consults were made to hospitalist and nephrology. Social Determinants affecting Dx or Tx: none    Chronic Conditions: Duodenal ulcers PE ESRD    Records Reviewed: Reviewed office records of  Dr. Flavia Wiley and recent hospital admission. 2022        I am the Primary Clinician of Record. ED COURSE:  ED Course as of 23 05   1530 Spoke with Dr Damien Varghese will HD tomorrow unless needed sooner.   [JN]      ED Course User Index  [JN] Licha Bates DO This patient's ED course included: a personal history and physicial examination, re-evaluation prior to disposition, multiple bedside re-evaluations, cardiac monitoring, and complex medical decision making and emergency management    This patient has remained hemodynamically stable, remained unchanged, and been closely monitored during their ED course. Re-Evaluations:             Re-evaluation. Patients symptoms are improving    Re-examination  1/30/23   11:32 AM EST    Consultations:             Dr. Torrey Alarcon and Dr. Charlie Vang: none    Counseling: The emergency provider has spoken with the patient and family member patient and daughter and discussed todays results, in addition to providing specific details for the plan of care and counseling regarding the diagnosis and prognosis. Questions are answered at this time and they are agreeable with the plan.       --------------------------------- IMPRESSION AND DISPOSITION ---------------------------------    IMPRESSION  1. Lower GI bleed New Problem       DISPOSITION  Disposition: Admit to telemetry  Patient condition is serious    NOTE: This report was transcribed using voice recognition software.  Every effort was made to ensure accuracy; however, inadvertent computerized transcription errors may be present       Marisol Aguila DO  01/31/23 8795

## 2023-01-30 NOTE — CONSULTS
GENERAL SURGERY  CONSULT NOTE  2023    Physician Consulted: Dr. Waldemar Patrick  Reason for Consult: Lower GI bleed  Referring Physician: Dr. Gilberto Burton     HPI  Caryn Blackburn is a 78 y.o. female with a past medical hx of PE, CKD on HD, diabetes mellitus type 2, CAD status post stenting x3, hyperlipidemia, hypertension,  x2 who presents for evaluation of bright red blood per rectum over the past 2 days. Patient was recently admitted a week ago for a upper GI bleed secondary to duodenal ulcers and duodenitis. Patient underwent an EGD at that time with gastroenterology and was started on a PPI. She had previously been on Eliquis, aspirin, Plavix and these all were held. She had a IVC filter placed at that time given her GI bleed and history of PE. She was discharged home and had been doing well up until yesterday evening when she had 1 small episode bright red blood per rectum. She had another episode this morning and presented to her PCP who recommended she come into the emergency department for further evaluation. Since arrival she has been hemodynamically stable regular heart rate and hypertension. She has not had any further episodes of bright red blood per rectum since she has been in the emergency department. She does have a history of a prior colonoscopy back in 2017 which was relatively unremarkable other than some diverticulosis.       Past Medical History:   Diagnosis Date    Acute pulmonary embolism ([address] 2022    Anemia in CKD (chronic kidney disease) 2021    Anxiety and depression 2022    At high risk for falls 2022    Brain aneurysm     CLABSI (central line-associated bloodstream infection) 2021    Cough 2022    Diabetes mellitus ([address] 2006    Diabetes mellitus type 2 with complications ([address]     Dizziness on standing 2022    End-stage renal disease on hemodialysis ([address] 2023    ESRD (end stage renal disease) ([address] 2021 ESRD on hemodialysis ([facility] 11/19/2021    Essential hypertension 11/30/2021    Fever, unspecified     Gastrointestinal hemorrhage 11/30/2021    H/O heart artery stent 2015    Done in [place]    History of Clostridioides difficile colitis 01/19/2022    Hyperlipidemia     Hypertension     Leg swelling 01/19/2023    Lymphedema of both lower extremities 01/19/2023    MSSA bacteremia 11/18/2021    Nausea & vomiting 11/18/2021    Obesity, Class III, BMI 40-49.9 (morbid obesity) ([facility] 37/89/7187    Periumbilical abdominal pain     S/P insertion of inferior vena caval filter 01/20/2023       Past Surgical History:   Procedure Laterality Date    CARDIAC CATHETERIZATION  2015    Done in [address] [place] [address]  2017    Negative findings    DIALYSIS FISTULA CREATION Left 01/28/2022    REVISION AV FISTULA LEFT ARM performed by Katheryne Prader, MD at [address]    PTCA  2015    PTCA [facility] N/A 12/01/2021    EGD BIOPSY performed by Bang Bear MD at [address] [place] [address] 1/18/2023    EGD BIOPSY performed by Bang Bear MD at [address] [place] [address] N/A 11/24/2021    CATHETER INSERTION  TUNNELED HEMODIALYSIS, WITH REMOVAL OF TEMPORARY CATHETER performed by Katheryne Prader, MD at [address]       Medications Prior to Admission    Prior to Admission medications    Medication Sig Start Date End Date Taking?  Authorizing Provider   Blood Glucose Monitoring Suppl (BLOOD GLUCOSE MONITOR SYSTEM) w/Device KIT by Does not apply route    Historical Provider, MD   glucose 4 g chewable tablet Take 4 tablets by mouth as needed for Low blood sugar 1/21/23   Nickie Anne DO   insulin glargine (LANTUS) 100 UNIT/ML injection vial Inject 24 Units into the skin nightly  Patient not taking: Reported on 1/30/2023 1/21/23   Baron Edilberto Anne DO   pantoprazole (PROTONIX) 40 MG tablet Take 1 tablet by mouth 2 times daily (before meals) 23   Simon Anne, DO   glucose monitoring (FREESTYLE FREEDOM) kit 1 kit by Does not apply route daily 23   Simon Anne, DO   Lancets 30G MISC 1 each by Does not apply route daily 23   Simon Anne, DO   blood glucose monitor strips Test 3 times a day. Dispense sufficient amount for indicated testing frequency plus additional to accommodate PRN testing needs. 23   Simon Anne, DO   rosuvastatin (CRESTOR) 5 MG tablet Take 1 tablet by mouth daily 22   Simon Anne, DO   insulin lispro, 1 Unit Dial, (HUMALOG KWIKPEN) 100 UNIT/ML SOPN Inject 8 units with meals + following sliding scale. -200 add 2U, -250 add 4U, -300 add 6U, -350 add 8U, -400 add 10U, BS over 400 add 12U  Patient not taking: Reported on 2023   Adriano Bains MD   acetaminophen (TYLENOL) 650 MG extended release tablet Take 1,300 mg by mouth every 8 hours as needed for Pain    Historical Provider, MD   midodrine (PROAMATINE) 10 MG tablet Take 10 mg by mouth daily as needed (AT DIALYSIS FOR LOW BP) 21   Historical Provider, MD   calcium acetate (PHOSLO) 667 MG CAPS capsule Take 667 mg by mouth 3 times daily (with meals) 21   Historical Provider, MD       Allergies   Allergen Reactions    Pcn [Penicillins] Shortness Of Breath and Rash    Benzonatate Other (See Comments)     \"PATIENTS STATES \"IT WAS LIKE I WAS DRUNK. \"    Morphine Itching and Rash    Sodium Hypochlorite Nausea And Vomiting and Rash     AKA BLEACH.   RASH FROM SKIN EXPOSURE        Family History   Problem Relation Age of Onset    Coronary Art Dis Mother          age 62 acute MI    Coronary Art Dis Father          age 62 CVA    Coronary Art Dis Brother        Social History     Tobacco Use    Smoking status: Former     Packs/day: 1.00     Years: 12.00     Pack years: 12.00     Types: Cigarettes     Start date: 66     Quit date:      Years since quittin.1    Smokeless tobacco: Never   Vaping Use    Vaping Use: Never used   Substance Use Topics    Alcohol use: Never    Drug use: Never         Review of Systems:   Review of Systems   Constitutional:  Positive for chills. Negative for fatigue and fever. Respiratory:  Negative for shortness of breath. Cardiovascular:  Negative for chest pain. Gastrointestinal:  Positive for anal bleeding and nausea. Negative for abdominal distention, abdominal pain, blood in stool, constipation, diarrhea and vomiting. Skin:  Negative for color change. Neurological:  Negative for dizziness. Psychiatric/Behavioral:  Negative for agitation. PHYSICAL EXAM:    Vitals:    23 1630   BP: (!) 142/59   Pulse: 77   Resp: 19   Temp: 98 °F (36.7 °C)   SpO2: 97%       GENERAL:  NAD. A&Ox3. HEAD:  Normocephalic. Atraumatic. EYES:   No scleral icterus. PERRL. LUNGS: no acute respiratory distress. CARDIOVASCULAR: Hemodynamically stable   ABDOMEN:  Soft, non-distended, non-tender. No guarding, rigidity, rebound. EXTREMITIES:   MAEx4. Atraumatic. No LE edema. SKIN:  Warm and dry  NEUROLOGIC:  No focal neurologic deficits  RECTAL: No hemorrhoids. Small amount of blood mixed with stool. ASSESSMENT/PLAN:  78 y.o. female with bright red blood per rectum after recent upper GI bleed. Plan  -Patient has been admitted to medicine  -Trend hemoglobin, transfuse for hemoglobin less than 7  -Maintain 2 large-bore IVs at all times  -As needed pain and nausea control  -Protonix 40 twice daily  -Okay for clear liquid diet from surgery standpoint  -Patient may need repeat scope/colonoscopy pending clinical course no plans for emergent scope at this time    Plan discussed with Dr. Janelle Johnson.     Regina Parry MD  Surgery Resident PGY-2  2023  4:53 PM

## 2023-01-31 LAB
ALBUMIN SERPL-MCNC: 3.4 G/DL (ref 3.5–5.2)
ALP BLD-CCNC: 78 U/L (ref 35–104)
ALT SERPL-CCNC: 13 U/L (ref 0–32)
ANION GAP SERPL CALCULATED.3IONS-SCNC: 13 MMOL/L (ref 7–16)
AST SERPL-CCNC: 13 U/L (ref 0–31)
BASOPHILS ABSOLUTE: 0.06 E9/L (ref 0–0.2)
BASOPHILS RELATIVE PERCENT: 0.7 % (ref 0–2)
BILIRUB SERPL-MCNC: 0.4 MG/DL (ref 0–1.2)
BILIRUBIN DIRECT: <0.2 MG/DL (ref 0–0.3)
BILIRUBIN, INDIRECT: ABNORMAL MG/DL (ref 0–1)
BUN BLDV-MCNC: 33 MG/DL (ref 6–23)
C-REACTIVE PROTEIN: 5.3 MG/DL (ref 0–0.4)
CALCIUM IONIZED: 1.07 MMOL/L (ref 1.15–1.33)
CALCIUM SERPL-MCNC: 8.4 MG/DL (ref 8.6–10.2)
CHLORIDE BLD-SCNC: 97 MMOL/L (ref 98–107)
CHOLESTEROL, TOTAL: 172 MG/DL (ref 0–199)
CO2: 27 MMOL/L (ref 22–29)
CREAT SERPL-MCNC: 6.5 MG/DL (ref 0.5–1)
EKG ATRIAL RATE: 97 BPM
EKG P AXIS: 37 DEGREES
EKG P-R INTERVAL: 192 MS
EKG Q-T INTERVAL: 388 MS
EKG QRS DURATION: 96 MS
EKG QTC CALCULATION (BAZETT): 492 MS
EKG R AXIS: -13 DEGREES
EKG T AXIS: 103 DEGREES
EKG VENTRICULAR RATE: 97 BPM
EOSINOPHILS ABSOLUTE: 0.9 E9/L (ref 0.05–0.5)
EOSINOPHILS RELATIVE PERCENT: 10.2 % (ref 0–6)
FERRITIN: 1357 NG/ML
FOLATE: 9.3 NG/ML (ref 4.8–24.2)
GFR SERPL CREATININE-BSD FRML MDRD: 7 ML/MIN/1.73
GLUCOSE BLD-MCNC: 167 MG/DL (ref 74–99)
HBA1C MFR BLD: 11 % (ref 4–5.6)
HCT VFR BLD CALC: 27.6 % (ref 34–48)
HCT VFR BLD CALC: 27.8 % (ref 34–48)
HDLC SERPL-MCNC: 43 MG/DL
HEMOGLOBIN: 8.6 G/DL (ref 11.5–15.5)
HEMOGLOBIN: 8.9 G/DL (ref 11.5–15.5)
IMMATURE GRANULOCYTES #: 0.03 E9/L
IMMATURE GRANULOCYTES %: 0.3 % (ref 0–5)
INR BLD: 1.1
IRON SATURATION: 26 % (ref 15–50)
IRON: 45 MCG/DL (ref 37–145)
LACTIC ACID, SEPSIS: 1.3 MMOL/L (ref 0.5–1.9)
LDL CHOLESTEROL CALCULATED: 100 MG/DL (ref 0–99)
LYMPHOCYTES ABSOLUTE: 1.1 E9/L (ref 1.5–4)
LYMPHOCYTES RELATIVE PERCENT: 12.4 % (ref 20–42)
MAGNESIUM: 1.9 MG/DL (ref 1.6–2.6)
MCH RBC QN AUTO: 30.8 PG (ref 26–35)
MCHC RBC AUTO-ENTMCNC: 31.2 % (ref 32–34.5)
MCV RBC AUTO: 98.9 FL (ref 80–99.9)
METER GLUCOSE: 138 MG/DL (ref 74–99)
METER GLUCOSE: 179 MG/DL (ref 74–99)
METER GLUCOSE: 221 MG/DL (ref 74–99)
MONOCYTES ABSOLUTE: 0.52 E9/L (ref 0.1–0.95)
MONOCYTES RELATIVE PERCENT: 5.9 % (ref 2–12)
NEUTROPHILS ABSOLUTE: 6.25 E9/L (ref 1.8–7.3)
NEUTROPHILS RELATIVE PERCENT: 70.5 % (ref 43–80)
PDW BLD-RTO: 15 FL (ref 11.5–15)
PHOSPHORUS: 5.2 MG/DL (ref 2.5–4.5)
PLATELET # BLD: 145 E9/L (ref 130–450)
PMV BLD AUTO: 10.7 FL (ref 7–12)
POTASSIUM SERPL-SCNC: 3.9 MMOL/L (ref 3.5–5)
PRO-BNP: ABNORMAL PG/ML (ref 0–125)
PROCALCITONIN: 0.41 NG/ML (ref 0–0.08)
PROTHROMBIN TIME: 12 SEC (ref 9.3–12.4)
RBC # BLD: 2.79 E12/L (ref 3.5–5.5)
SEDIMENTATION RATE, ERYTHROCYTE: 70 MM/HR (ref 0–20)
SODIUM BLD-SCNC: 137 MMOL/L (ref 132–146)
TOTAL CK: 32 U/L (ref 20–180)
TOTAL IRON BINDING CAPACITY: 173 MCG/DL (ref 250–450)
TOTAL PROTEIN: 6.3 G/DL (ref 6.4–8.3)
TRIGL SERPL-MCNC: 143 MG/DL (ref 0–149)
TSH SERPL DL<=0.05 MIU/L-ACNC: 1.72 UIU/ML (ref 0.27–4.2)
URIC ACID, SERUM: 7.3 MG/DL (ref 2.4–5.7)
VITAMIN B-12: 451 PG/ML (ref 211–946)
VLDLC SERPL CALC-MCNC: 29 MG/DL
WBC # BLD: 8.9 E9/L (ref 4.5–11.5)

## 2023-01-31 PROCEDURE — 83880 ASSAY OF NATRIURETIC PEPTIDE: CPT

## 2023-01-31 PROCEDURE — 82607 VITAMIN B-12: CPT

## 2023-01-31 PROCEDURE — 97165 OT EVAL LOW COMPLEX 30 MIN: CPT

## 2023-01-31 PROCEDURE — 6370000000 HC RX 637 (ALT 250 FOR IP): Performed by: INTERNAL MEDICINE

## 2023-01-31 PROCEDURE — 84145 PROCALCITONIN (PCT): CPT

## 2023-01-31 PROCEDURE — 83605 ASSAY OF LACTIC ACID: CPT

## 2023-01-31 PROCEDURE — 5A1D70Z PERFORMANCE OF URINARY FILTRATION, INTERMITTENT, LESS THAN 6 HOURS PER DAY: ICD-10-PCS | Performed by: INTERNAL MEDICINE

## 2023-01-31 PROCEDURE — 2500000003 HC RX 250 WO HCPCS: Performed by: INTERNAL MEDICINE

## 2023-01-31 PROCEDURE — 84443 ASSAY THYROID STIM HORMONE: CPT

## 2023-01-31 PROCEDURE — 80076 HEPATIC FUNCTION PANEL: CPT

## 2023-01-31 PROCEDURE — 82550 ASSAY OF CK (CPK): CPT

## 2023-01-31 PROCEDURE — 85014 HEMATOCRIT: CPT

## 2023-01-31 PROCEDURE — 84550 ASSAY OF BLOOD/URIC ACID: CPT

## 2023-01-31 PROCEDURE — 6360000002 HC RX W HCPCS: Performed by: STUDENT IN AN ORGANIZED HEALTH CARE EDUCATION/TRAINING PROGRAM

## 2023-01-31 PROCEDURE — 36415 COLL VENOUS BLD VENIPUNCTURE: CPT

## 2023-01-31 PROCEDURE — 83036 HEMOGLOBIN GLYCOSYLATED A1C: CPT

## 2023-01-31 PROCEDURE — C9113 INJ PANTOPRAZOLE SODIUM, VIA: HCPCS | Performed by: STUDENT IN AN ORGANIZED HEALTH CARE EDUCATION/TRAINING PROGRAM

## 2023-01-31 PROCEDURE — 82728 ASSAY OF FERRITIN: CPT

## 2023-01-31 PROCEDURE — 80061 LIPID PANEL: CPT

## 2023-01-31 PROCEDURE — 93010 ELECTROCARDIOGRAM REPORT: CPT | Performed by: INTERNAL MEDICINE

## 2023-01-31 PROCEDURE — 82746 ASSAY OF FOLIC ACID SERUM: CPT

## 2023-01-31 PROCEDURE — 2580000003 HC RX 258: Performed by: STUDENT IN AN ORGANIZED HEALTH CARE EDUCATION/TRAINING PROGRAM

## 2023-01-31 PROCEDURE — 86140 C-REACTIVE PROTEIN: CPT

## 2023-01-31 PROCEDURE — 82962 GLUCOSE BLOOD TEST: CPT

## 2023-01-31 PROCEDURE — 85018 HEMOGLOBIN: CPT

## 2023-01-31 PROCEDURE — 6370000000 HC RX 637 (ALT 250 FOR IP): Performed by: STUDENT IN AN ORGANIZED HEALTH CARE EDUCATION/TRAINING PROGRAM

## 2023-01-31 PROCEDURE — 85025 COMPLETE CBC W/AUTO DIFF WBC: CPT

## 2023-01-31 PROCEDURE — 83735 ASSAY OF MAGNESIUM: CPT

## 2023-01-31 PROCEDURE — 97161 PT EVAL LOW COMPLEX 20 MIN: CPT

## 2023-01-31 PROCEDURE — 83550 IRON BINDING TEST: CPT

## 2023-01-31 PROCEDURE — 90935 HEMODIALYSIS ONE EVALUATION: CPT

## 2023-01-31 PROCEDURE — 2700000000 HC OXYGEN THERAPY PER DAY

## 2023-01-31 PROCEDURE — 85610 PROTHROMBIN TIME: CPT

## 2023-01-31 PROCEDURE — 6360000002 HC RX W HCPCS

## 2023-01-31 PROCEDURE — 83540 ASSAY OF IRON: CPT

## 2023-01-31 PROCEDURE — 82330 ASSAY OF CALCIUM: CPT

## 2023-01-31 PROCEDURE — 80048 BASIC METABOLIC PNL TOTAL CA: CPT

## 2023-01-31 PROCEDURE — 2060000000 HC ICU INTERMEDIATE R&B

## 2023-01-31 PROCEDURE — 85651 RBC SED RATE NONAUTOMATED: CPT

## 2023-01-31 PROCEDURE — 84100 ASSAY OF PHOSPHORUS: CPT

## 2023-01-31 RX ORDER — DIPHENHYDRAMINE HYDROCHLORIDE 50 MG/ML
INJECTION INTRAMUSCULAR; INTRAVENOUS
Status: COMPLETED
Start: 2023-01-31 | End: 2023-01-31

## 2023-01-31 RX ORDER — INSULIN LISPRO 100 [IU]/ML
4 INJECTION, SOLUTION INTRAVENOUS; SUBCUTANEOUS
Status: DISCONTINUED | OUTPATIENT
Start: 2023-02-01 | End: 2023-02-01 | Stop reason: HOSPADM

## 2023-01-31 RX ORDER — INSULIN LISPRO 100 [IU]/ML
0-4 INJECTION, SOLUTION INTRAVENOUS; SUBCUTANEOUS NIGHTLY
Status: DISCONTINUED | OUTPATIENT
Start: 2023-01-31 | End: 2023-02-01 | Stop reason: HOSPADM

## 2023-01-31 RX ORDER — INSULIN GLARGINE 100 [IU]/ML
20 INJECTION, SOLUTION SUBCUTANEOUS NIGHTLY
Status: DISCONTINUED | OUTPATIENT
Start: 2023-01-31 | End: 2023-02-01 | Stop reason: HOSPADM

## 2023-01-31 RX ORDER — INSULIN LISPRO 100 [IU]/ML
0-8 INJECTION, SOLUTION INTRAVENOUS; SUBCUTANEOUS
Status: DISCONTINUED | OUTPATIENT
Start: 2023-02-01 | End: 2023-02-01 | Stop reason: HOSPADM

## 2023-01-31 RX ORDER — DIPHENHYDRAMINE HYDROCHLORIDE 50 MG/ML
12.5 INJECTION INTRAMUSCULAR; INTRAVENOUS ONCE
Status: COMPLETED | OUTPATIENT
Start: 2023-01-31 | End: 2023-01-31

## 2023-01-31 RX ADMIN — ONDANSETRON 4 MG: 2 INJECTION INTRAMUSCULAR; INTRAVENOUS at 10:34

## 2023-01-31 RX ADMIN — SODIUM CHLORIDE, PRESERVATIVE FREE 10 ML: 5 INJECTION INTRAVENOUS at 20:01

## 2023-01-31 RX ADMIN — DIPHENHYDRAMINE HYDROCHLORIDE 12.5 MG: 50 INJECTION, SOLUTION INTRAMUSCULAR; INTRAVENOUS at 08:50

## 2023-01-31 RX ADMIN — DIPHENHYDRAMINE HYDROCHLORIDE 12.5 MG: 50 INJECTION INTRAMUSCULAR; INTRAVENOUS at 08:50

## 2023-01-31 RX ADMIN — MIDODRINE HYDROCHLORIDE 10 MG: 5 TABLET ORAL at 10:01

## 2023-01-31 RX ADMIN — ROSUVASTATIN CALCIUM 5 MG: 5 TABLET, FILM COATED ORAL at 14:09

## 2023-01-31 RX ADMIN — ACETAMINOPHEN 650 MG: 325 TABLET ORAL at 00:50

## 2023-01-31 RX ADMIN — INSULIN GLARGINE 20 UNITS: 100 INJECTION, SOLUTION SUBCUTANEOUS at 20:02

## 2023-01-31 RX ADMIN — PANTOPRAZOLE SODIUM 40 MG: 40 INJECTION, POWDER, FOR SOLUTION INTRAVENOUS at 20:01

## 2023-01-31 RX ADMIN — PANTOPRAZOLE SODIUM 40 MG: 40 INJECTION, POWDER, FOR SOLUTION INTRAVENOUS at 06:27

## 2023-01-31 RX ADMIN — CALCIUM ACETATE 667 MG: 667 CAPSULE ORAL at 14:09

## 2023-01-31 RX ADMIN — CALCIUM ACETATE 667 MG: 667 CAPSULE ORAL at 16:57

## 2023-01-31 ASSESSMENT — PAIN SCALES - GENERAL
PAINLEVEL_OUTOF10: 0
PAINLEVEL_OUTOF10: 0

## 2023-01-31 ASSESSMENT — PAIN DESCRIPTION - LOCATION: LOCATION: RIB CAGE;SHOULDER

## 2023-01-31 NOTE — PROGRESS NOTES
GENERAL SURGERY  DAILY PROGRESS NOTE  1/31/2023    Subjective:  No new complaints or overnight events. No further rectal bleeding. No bowel movement. No nausea or vomiting    Objective:  BP (!) 136/56   Pulse 70   Temp 98.1 °F (36.7 °C) (Oral)   Resp 16   Ht 5' 4\" (1.626 m)   Wt 260 lb 3.2 oz (118 kg)   SpO2 97%   BMI 44.66 kg/m²     General Appearance:  awake, alert, oriented, in no acute distress  Skin:  Skin color, texture, turgor normal  Head/face:  NCAT  Eyes:  No gross abnormalities. Sclera nonicteric  Lungs/Chest:  Normal expansion. No respiratory distress. Heart: Warm throughout. Regular rate   Abdomen:  Soft, no tenderness, morbidly obese. Umbilical hernia without bowel is present    Extremities: Extremities warm to touch, pink, with no edema. I have personally reviewed all relevant labs and imaging. Hgb 8.6 today from 8.7 yesterday    Assessment/Plan:  78 y.o. female with bright red blood per rectum after recent upper GI bleed.     - no plans for procedure at this point  - patient may follow ups as outpatient for colonoscopy if hemoglobin remains stable  - okay for diet from surgical standpoint    Electronically signed by Luly Salinas DO on 1/31/2023 at 7:39 AM

## 2023-01-31 NOTE — H&P
History and Physical      CHIEF COMPLAINT: Bright red rectal bleeding per rectum    History of Present Illness: 50-year-old female patient of Dr. Brianda Bang I am asked to admit and follow. History obtained from patient as well as extensive review of electronic record. (Patient currently sitting in bedside chair, daughter present through the exam.  No further bleeding. She advises me she has NOT BEEN USING Lantus nor Humalog; insurance would not cover it. ) Patient was seen at Dr. Alfonso Jose office yesterday with the above complaints of bright red rectal bleeding. The above has been ongoing for 2 days. Patient was recently admitted to this hospital 1/17/2023 due to hematemesis. She had been on apixaban which was started early 12/2022 due to finding of pulmonary embolus. At the time of hematemesis the apixaban was discontinued. Patient is on chronic hemodialysis due to end-stage renal disease. She underwent EGD by GI service on 1/19/2023 with the following findings--    DESCRIPTION OF PROCEDURE:  With the patient in the left lateral  decubitus position, the Olympus GIF-100 forward-viewing videoscope was  introduced into the esophagus, the evaluation of which showed grade B LA  classification GERD and no hiatal hernia was seen. The scope was then advanced through the gastroesophageal junction into  the gastric body, along the greater curvature. Evaluation of the body  of the stomach showed minimal gastritis. Biopsies were taken from this  area to rule out Helicobacter pylori infection. The scope was then advanced through the pylorus into the duodenal bulb  and second portion of the duodenum and evaluation showed severe  duodenitis with edema and superficial ulceration. Biopsies were done to  rule out sprue. The scope was then retrieved and retroflexed in the  prepyloric antrum, with thorough evaluation of the cardiac and fundal  portions of the stomach, which appeared to be within normal limits. The scope was then straightened, the area deflated, and the procedure  was terminated by withdrawing the scope and conducting a second look on  the way out, which was essentially the same (BRANDIE test done from that day was negative. Surgical biopsy showed chronic peptic duodenitis). Due to the recent pulmonary embolus it was felt best to have vascular consulted for possible IVC filter. Doppler ultrasound of lower extremities at that time was negative. She was taken to [facility] for the IV filter insertion on 1/20/2023. She was eventually discharged home to continue her hemodialysis. (Patient also has insulin-dependent diabetes had not been taking Lantus nor fast acting insulin prior to the admission for hematemesis. Patient was given prescriptions for the above however some difficulties encountered with insurance coverage. I discussed the above with Dr. Mishel Clark who would rectify the situation. )  --In the ED this admission initial blood pressure 129/52. SPO2 was 85 on room air. --In the ED random glucose was 383 BUN 30 creatinine 5.6. Admitting hemoglobin was 8.9 and it had been 8.4 on 1/21/2023. --Today's labs BUN 33 creatinine 6.5 ionized calcium low at 1.07 total calcium low at 8.4. Phosphorus 5.2 protein 6.3 uric acid 7.3. Fasting glucose today 163 lactic acid 1.3 magnesium 1.9 potassium 3.9.  proBNP 30,524; it was 34,677 on 1/17/2023. A1c today 11.0 TSH 1.720 hemoglobin 8.6 WBC 8.9. INR 1.1 ESR 70.  --CT of the abdomen done in the ED with following results--    FINDINGS:   Lower Chest: The heart is normal in size. Prominent calcified coronary   atherosclerosis. Small right pleural effusion with overlying compressive   atelectasis. Organs:     Liver: Unremarkable. Gallbladder: Layering hyperdensity in the gallbladder likely represent   gallstones. No pericholecystic edema or biliary ductal dilatation. Pancreas: Moderately atrophied.   No mass, ductal dilatation or edema is seen. Spleen:  Unremarkable. Adrenals: Unremarkable. Kidneys: Unremarkable. GI/Bowel: A few scattered distal colonic diverticuli are noted. No evidence   of acute diverticulitis. No bowel wall thickening or obstruction is seen. The appendix is unremarkable. Pelvis: The urinary bladder is unremarkable. Within limits of the CT   technique, the uterus and the adnexa are grossly unremarkable. Peritoneum/Retroperitoneum: No evidence of abdominal aortic aneurysm. Mild   calcified atherosclerosis in the pelvic arteries. IVC filter in situ. No   lymphadenopathy. No free air. No ascites. Bones/Soft Tissues: The visualized bones are intact without fracture or focal   lesion. Miscellaneous: 9.7 x 5.3 x 8.1 cm fat containing umbilical hernia. No   evidence of fat strangulation. Impression:       1. No acute abnormality is seen in the abdomen or the pelvis. 2. Minimal distal colonic diverticulosis without diverticulitis. 3. Prominent fat containing umbilical hernia. No evidence of fat   strangulation. 4. Cholelithiasis. 5. Small right pleural effusion. Patient was seen by surgical service yesterday; last colonoscopy 2017 which was unremarkable. Transfuse hemoglobin less than 7. Protonix 40 mg IV twice daily. Okay for clear liquid diet from surgery. May need repeat colonoscopy, no emergent scope at this time. Surgical note from today no further rectal bleeding no bowel movement. No plans for procedure at this point. Okay for diet from surgical standpoint. Patient underwent dialysis this morning with net removed 1700 cc. Nephrology consult from the appreciated. Anemia due to ESRD. Secondary hyperparathyroidism due to ESRD. Continue hemodialysis Tuesdays Thursdays Saturdays. Calcium acetate 667 mg 3 times daily.           Past Medical History:   Diagnosis Date    Acute pulmonary embolism ([address] 12/03/2022    Anemia in CKD (chronic kidney disease) 11/30/2021    Anxiety and depression 01/19/2022    At high risk for falls 01/19/2022    Brain aneurysm     CLABSI (central line-associated bloodstream infection) 11/19/2021    Cough 01/19/2022    Diabetes mellitus ([address] 2006    Diabetes mellitus type 2 with complications ([address] 67/80/4368    Dizziness on standing 01/19/2022    End-stage renal disease on hemodialysis ([address] 01/19/2023    ESRD (end stage renal disease) ([address] 11/19/2021    ESRD on hemodialysis ([address] 11/19/2021    Essential hypertension 11/30/2021    Fever, unspecified     Gastrointestinal hemorrhage 11/30/2021    H/O heart artery stent 2015    Done in [place]    History of Clostridioides difficile colitis 01/19/2022    Hyperlipidemia     Hypertension     Leg swelling 01/19/2023    Lymphedema of both lower extremities 01/19/2023    MSSA bacteremia 11/18/2021    Nausea & vomiting 11/18/2021    Obesity, Class III, BMI 40-49.9 (morbid obesity) ([address] 60/39/0637    Periumbilical abdominal pain     S/P insertion of inferior vena caval filter 01/20/2023         Past Surgical History:   Procedure Laterality Date    CARDIAC CATHETERIZATION  2015    Done in [address] [place] [address]  2017    Negative findings    DIALYSIS FISTULA CREATION Left 01/28/2022    REVISION AV FISTULA LEFT ARM performed by Aundrea Cheng MD at [address] [place] [address]    PTCA  2015    PTCA [address] [place] [address] N/A 12/01/2021    EGD BIOPSY performed by Radha Segovia MD at [place] [address] N/A 1/18/2023    EGD BIOPSY performed by Radha Segovia MD at [address] [place] [address] N/A 11/24/2021    CATHETER INSERTION  TUNNELED HEMODIALYSIS, WITH REMOVAL OF TEMPORARY CATHETER performed by Aundrea Cheng MD at [facility] OR       Medications Prior to Admission:    Medications Prior to Admission: Blood Glucose Monitoring Suppl (BLOOD GLUCOSE MONITOR SYSTEM) w/Device KIT, by Does not apply route (Patient not taking: Reported on 1/30/2023)  glucose 4 g chewable tablet, Take 4 tablets by mouth as needed for Low blood sugar  insulin glargine (LANTUS) 100 UNIT/ML injection vial, Inject 24 Units into the skin nightly (Patient not taking: No sig reported)  pantoprazole (PROTONIX) 40 MG tablet, Take 1 tablet by mouth 2 times daily (before meals)  glucose monitoring (FREESTYLE FREEDOM) kit, 1 kit by Does not apply route daily (Patient not taking: Reported on 1/30/2023)  Lancets 30G MISC, 1 each by Does not apply route daily (Patient not taking: Reported on 1/30/2023)  blood glucose monitor strips, Test 3 times a day. Dispense sufficient amount for indicated testing frequency plus additional to accommodate PRN testing needs. (Patient not taking: Reported on 1/30/2023)  rosuvastatin (CRESTOR) 5 MG tablet, Take 1 tablet by mouth daily  insulin lispro, 1 Unit Dial, (HUMALOG KWIKPEN) 100 UNIT/ML SOPN, Inject 8 units with meals + following sliding scale. -200 add 2U, -250 add 4U, -300 add 6U, -350 add 8U, -400 add 10U, BS over 400 add 12U (Patient not taking: No sig reported)  acetaminophen (TYLENOL) 650 MG extended release tablet, Take 1,300 mg by mouth every 8 hours as needed for Pain  midodrine (PROAMATINE) 10 MG tablet, Take 10 mg by mouth daily as needed (AT DIALYSIS FOR LOW BP)  calcium acetate (PHOSLO) 667 MG CAPS capsule, Take 667 mg by mouth 3 times daily (with meals)    Allergies:    Pcn [penicillins], Benzonatate, Morphine, and Sodium hypochlorite    Social History:    reports that she quit smoking about 37 years ago. Her smoking use included cigarettes. She started smoking about 49 years ago. She has a 12.00 pack-year smoking history. She has never used smokeless tobacco. She reports that she does not drink alcohol and does not use drugs. Family History:   family history includes Coronary Art Dis in her brother, father, and mother.     REVIEW OF SYSTEMS:  As above in the HPI, otherwise negative    PHYSICAL EXAM:    VS: BP (!) 122/39   Pulse 77   Temp 98 °F (36.7 °C)   Resp 16   Ht 5' 4\" (1.626 m)   Wt 263 lb 0.1 oz (119.3 kg)   SpO2 97%   BMI 45.15 kg/m²     General appearance: Alert, Awake, Oriented times 3, no distress; nasal cannula oxygen in place; morbidly obese; sitting in bedside chair  Skin: Warm and dry ; no rashes  Head: Normocephalic. No masses, lesions or tenderness noted  Eyes: Conjunctivae pale, sclera white. PERRL,EOM-I  Ears: External ears normal  Nose/Sinuses: Nares normal. Septum midline. Mucosa normal. No drainage  Oropharynx: Oropharynx clear with no exudate seen  Neck: Supple. No jugular venous distension, lymphadenopathy or thyromegaly Trachea midline  Lungs: Clear to auscultation bilaterally. No rhonchi, crackles or wheezes  Heart: S1 S2  Regular rate and rhythm. No rub, gallop, murmur  Abdomen: Soft, non-tender. BS normal. No masses, organomegaly; no rebound or guarding  Extremities: 2-3+ edema bilateral, patient states it started with dialysis  Musculoskeletal: Muscular strength appears intact. Neuro:  No focal motor defects ; II-XII grossly intact .  MORRIS equally  Breast: deferred  Rectal: deferred  Genitalia:  deferred    LABS:  CBC:   Lab Results   Component Value Date/Time    WBC 8.9 01/31/2023 06:36 AM    RBC 2.79 01/31/2023 06:36 AM    HGB 8.6 01/31/2023 06:36 AM    HCT 27.6 01/31/2023 06:36 AM    MCV 98.9 01/31/2023 06:36 AM    MCH 30.8 01/31/2023 06:36 AM    MCHC 31.2 01/31/2023 06:36 AM    RDW 15.0 01/31/2023 06:36 AM     01/31/2023 06:36 AM    MPV 10.7 01/31/2023 06:36 AM     CBC with Differential:    Lab Results   Component Value Date/Time    WBC 8.9 01/31/2023 06:36 AM    RBC 2.79 01/31/2023 06:36 AM    HGB 8.6 01/31/2023 06:36 AM    HCT 27.6 01/31/2023 06:36 AM     01/31/2023 06:36 AM    MCV 98.9 01/31/2023 06:36 AM    MCH 30.8 01/31/2023 06:36 AM    MCHC 31.2 01/31/2023 06:36 AM    RDW 15.0 01/31/2023 06:36 AM NRBC 0.0 01/13/2022 04:38 AM    LYMPHOPCT 12.4 01/31/2023 06:36 AM    MONOPCT 5.9 01/31/2023 06:36 AM    BASOPCT 0.7 01/31/2023 06:36 AM    MONOSABS 0.52 01/31/2023 06:36 AM    LYMPHSABS 1.10 01/31/2023 06:36 AM    EOSABS 0.90 01/31/2023 06:36 AM    BASOSABS 0.06 01/31/2023 06:36 AM     Hemoglobin/Hematocrit:    Lab Results   Component Value Date/Time    HGB 8.6 01/31/2023 06:36 AM    HCT 27.6 01/31/2023 06:36 AM     CMP:    Lab Results   Component Value Date/Time     01/31/2023 06:36 AM    K 3.9 01/31/2023 06:36 AM    K 4.2 08/23/2022 12:12 PM    CL 97 01/31/2023 06:36 AM    CO2 27 01/31/2023 06:36 AM    BUN 33 01/31/2023 06:36 AM    CREATININE 6.5 01/31/2023 06:36 AM    GFRAA 8 09/22/2022 12:14 PM    LABGLOM 7 01/31/2023 06:36 AM    GLUCOSE 167 01/31/2023 06:36 AM    PROT 6.3 01/31/2023 06:36 AM    LABALBU 3.4 01/31/2023 06:36 AM    CALCIUM 8.4 01/31/2023 06:36 AM    BILITOT 0.4 01/31/2023 06:36 AM    ALKPHOS 78 01/31/2023 06:36 AM    AST 13 01/31/2023 06:36 AM    ALT 13 01/31/2023 06:36 AM     BMP:    Lab Results   Component Value Date/Time     01/31/2023 06:36 AM    K 3.9 01/31/2023 06:36 AM    K 4.2 08/23/2022 12:12 PM    CL 97 01/31/2023 06:36 AM    CO2 27 01/31/2023 06:36 AM    BUN 33 01/31/2023 06:36 AM    LABALBU 3.4 01/31/2023 06:36 AM    CREATININE 6.5 01/31/2023 06:36 AM    CALCIUM 8.4 01/31/2023 06:36 AM    GFRAA 8 09/22/2022 12:14 PM    LABGLOM 7 01/31/2023 06:36 AM    GLUCOSE 167 01/31/2023 06:36 AM     Hepatic Function Panel:    Lab Results   Component Value Date/Time    ALKPHOS 78 01/31/2023 06:36 AM    ALT 13 01/31/2023 06:36 AM    AST 13 01/31/2023 06:36 AM    PROT 6.3 01/31/2023 06:36 AM    BILITOT 0.4 01/31/2023 06:36 AM    BILIDIR <0.2 01/31/2023 06:36 AM    IBILI see below 01/31/2023 06:36 AM    LABALBU 3.4 01/31/2023 06:36 AM     Ionized Calcium:  No results found for: IONCA  Magnesium:    Lab Results   Component Value Date/Time    MG 1.9 01/31/2023 06:36 AM     Phosphorus: Lab Results   Component Value Date/Time    PHOS 5.2 01/31/2023 06:36 AM     LDH:    Lab Results   Component Value Date/Time     01/11/2022 12:45 PM     Uric Acid:    Lab Results   Component Value Date/Time    LABURIC 7.3 01/31/2023 06:36 AM     PT/INR:    Lab Results   Component Value Date/Time    PROTIME 12.0 01/31/2023 06:36 AM    INR 1.1 01/31/2023 06:36 AM     Warfarin PT/INR:  No components found for: PTPATWAR, PTINRWAR  PTT:    Lab Results   Component Value Date/Time    APTT 34.5 01/17/2023 08:35 AM   [APTT}  Troponin:  No results found for: TROPONINI  Last 3 Troponin:  No results found for: TROPONINI  U/A:    Lab Results   Component Value Date/Time    COLORU Yellow 11/18/2021 03:19 AM    PROTEINU >=300 11/18/2021 03:19 AM    PHUR 6.0 11/18/2021 03:19 AM    WBCUA 1-3 11/18/2021 03:19 AM    RBCUA 0-1 11/18/2021 03:19 AM    BACTERIA FEW 11/18/2021 03:19 AM    CLARITYU Clear 11/18/2021 03:19 AM    SPECGRAV 1.020 11/18/2021 03:19 AM    LEUKOCYTESUR Negative 11/18/2021 03:19 AM    UROBILINOGEN 0.2 11/18/2021 03:19 AM    BILIRUBINUR Negative 11/18/2021 03:19 AM    BLOODU SMALL 11/18/2021 03:19 AM    GLUCOSEU 500 11/18/2021 03:19 AM     HgBA1c:    Lab Results   Component Value Date/Time    LABA1C 11.0 01/31/2023 06:35 AM     FLP:    Lab Results   Component Value Date/Time    TRIG 143 01/31/2023 06:36 AM    HDL 43 01/31/2023 06:36 AM    LDLCALC 100 01/31/2023 06:36 AM    LABVLDL 29 01/31/2023 06:36 AM     TSH:    Lab Results   Component Value Date/Time    TSH 1.720 01/31/2023 06:36 AM     VITAMIN B12: No components found for: B12  FOLATE:  No results found for: FOLATE  IRON:    Lab Results   Component Value Date/Time    IRON 35 01/18/2023 03:55 AM     Iron Saturation:  No components found for: PERCENTFE  TIBC:    Lab Results   Component Value Date/Time    TIBC 173 01/18/2023 03:55 AM     FERRITIN:    Lab Results   Component Value Date/Time    FERRITIN 1,513 01/18/2023 03:55 AM     LIPASE:    Lab Results   Component Value Date/Time    LIPASE 43 01/17/2023 08:35 AM       RADIOLOGY:  CT ABDOMEN PELVIS W IV CONTRAST Additional Contrast? None   Final Result   1.  No acute abnormality is seen in the abdomen or the pelvis.   2. Minimal distal colonic diverticulosis without diverticulitis.   3. Prominent fat containing umbilical hernia.  No evidence of fat   strangulation.   4. Cholelithiasis.   5. Small right pleural effusion.             ASSESSMENT:      Active Hospital Problems    Diagnosis     Rectal bleeding [K62.5]      Priority: Medium    BRBPR (bright red blood per rectum) [K62.5]      Priority: Medium    End-stage renal disease on hemodialysis ([facility]) [N18.6, Z99.2]      Priority: Medium    Lymphedema of both lower extremities [I89.0]      Priority: Medium    H/O heart artery stent [Z95.5]      Priority: Medium    Diabetes mellitus ([facility]) [E11.9]      Priority: Medium    Pulmonary hypertension, unspecified ([facility]) [I27.20]     Obesity, Class III, BMI 40-49.9 (morbid obesity) ([facility]) [E66.01]     Diabetes mellitus type 2 with complications ([facility]) [E11.8]        PLAN:  Medications discussed with patient  GI prophylaxis  DVT prophylaxis  Consultants notes reviewed  Surgery has been consulted  Endocrinology consult; she has not been using Lantus nor Humalog at home  Lantus 24 units nightly  Low-dose sliding scale insulin  Continue hemodialysis  Rosuvastatin 5 mg daily  Protonix 40 mg IV twice daily  Calcium acetate 667 mg, 3 times daily with meals  Retacrit 3560 units Monday Wednesday Friday  ProAmatine 10 mg daily as need for low blood pressure at dialysis  No surgical procedures planned at this point  Change diet to 4 carbohydrate no added salt diet      Please note that over 50 minutes was spent .  Greater than 51% includes interviewing patient and reviewing chart; performing medical examination; ordering medications, tests and/or procedures; formulating assessment plan, reviewing rationale for the above recommendations;  reviewing available records, results of all previously ordered tests and procedures and current problem list; counseling/educating the patient; coordinating care with other healthcare professionals; communicating results to the patient's family/caregiver; documenting clinical information in the electronic record      Sendy Dewitt DO    10:06 AM  1/31/2023    Voice recognition software use for dictation

## 2023-01-31 NOTE — CONSULTS
[facility] MD Teresa Bates MD Elyce Junior, MD Ortencia Linden, CNP Crystal Lambling, ANP Vianne Hugh, CNP  Consultation  Patient's Name: Phillip Doran  12:31 PM  1/31/2023    Nephrologist: Annie Allan MD    Reason for Consult:  End-stage renal failure   Requesting Physician:  Wade Pavon DO    Chief Complaint:  Rectal bleeding     History Obtained From: Patient, chart    History of Present Ilness:         Ms. Ivory Mahajan is a pleasant 78year old woman who presented to the emergency room with the complaint of bright red rectal bleeding. She has been on Eliquis for a pulmonary embolism, however, this was placed on hold a few weeks ago during a hospital admission for hematemesis. She had an EGD at this time that revealed an ulceration in the duodenum and subsequently had an IVC filter placed to alleviate the need for Eliquis for the time being. She tells me that she is having another upper endoscopy on February 8th with Dr. Jacinda Medina to determine if the duodenal ulcer has healed. If that is the case, she will resume Eliquis and the IVC filter will be removed. Surgery was consulted and does not plan on doing a colonoscopy inpatient. Her hemoglobin is currently stable. She is well known to Dr. Unruly Fine from the outpatient practice where he follows her for ESRD. She undergoes chronic intermittent hemodialysis on Tuesdays Thursdays and Saturdays at the [facility]. Recent treatments have been without complication. Her most recent treatment was on Saturday. She is currently seen while on hemodialysis. She is tolerating treatment without complications. She does report slight nausea. Otherwise, she denies chest pain, dyspnea, palpitations, diarrhea, or vomiting.      Past Medical History:   Diagnosis Date    Acute pulmonary embolism ([address] 12/03/2022    Anemia in CKD (chronic kidney disease) 11/30/2021    Anxiety and depression 01/19/2022    At high risk for falls 2022    Brain aneurysm     CLABSI (central line-associated bloodstream infection) 2021    Cough 2022    Diabetes mellitus ([facility] 2006    Diabetes mellitus type 2 with complications ([address]     Dizziness on standing 2022    End-stage renal disease on hemodialysis ([address] 2023    ESRD (end stage renal disease) ([address] 2021    ESRD on hemodialysis ([address] 2021    Essential hypertension 2021    Fever, unspecified     Gastrointestinal hemorrhage 2021    H/O heart artery stent 2015    Done in [place]    History of Clostridioides difficile colitis 2022    Hyperlipidemia     Hypertension     Leg swelling 2023    Lymphedema of both lower extremities 2023    MSSA bacteremia 2021    Nausea & vomiting 2021    Obesity, Class III, BMI 40-49.9 (morbid obesity) ([facility]     Periumbilical abdominal pain     S/P insertion of inferior vena caval filter 2023       Past Surgical History:   Procedure Laterality Date    CARDIAC CATHETERIZATION  2015    Done in [address] [place] [address] Rd  2017    Negative findings    DIALYSIS FISTULA CREATION Left 2022    REVISION AV FISTULA LEFT ARM performed by Andre Pool MD at [address] [place]    PTCA      PTCA [address] ENDOSCOPY N/A 2021    EGD BIOPSY performed by Judy Praker MD at [address] N/A 2023    EGD BIOPSY performed by Judy Parker MD at [address] N/A 2021    CATHETER INSERTION  TUNNELED HEMODIALYSIS, WITH REMOVAL OF TEMPORARY CATHETER performed by Andre Pool MD at [address] [place]       Family History   Problem Relation Age of Onset    Coronary Art Dis Mother          age 62 acute MI    Coronary Art Dis Father          age 62 CVA    Coronary Art Dis Brother         reports that she quit smoking about 37 years ago. Her smoking use included cigarettes. She started smoking about 49 years ago. She has a 12.00 pack-year smoking history. She has never used smokeless tobacco. She reports that she does not drink alcohol and does not use drugs.     Allergies:  Pcn [penicillins], Benzonatate, Morphine, and Sodium hypochlorite    Current Medications:    sodium chloride flush 0.9 % injection 5-40 mL, 2 times per day  sodium chloride flush 0.9 % injection 5-40 mL, PRN  0.9 % sodium chloride infusion, PRN  ondansetron (ZOFRAN-ODT) disintegrating tablet 4 mg, Q8H PRN   Or  ondansetron (ZOFRAN) injection 4 mg, Q6H PRN  polyethylene glycol (GLYCOLAX) packet 17 g, Daily PRN  acetaminophen (TYLENOL) tablet 650 mg, Q6H PRN   Or  acetaminophen (TYLENOL) suppository 650 mg, Q6H PRN  pantoprazole (PROTONIX) injection 40 mg, BID  calcium acetate (PHOSLO) capsule 667 mg, TID WC  insulin glargine (LANTUS) injection vial 24 Units, Nightly  midodrine (PROAMATINE) tablet 10 mg, Daily PRN  rosuvastatin (CRESTOR) tablet 5 mg, Daily  0.9 % sodium chloride infusion, Continuous  insulin lispro (HUMALOG) injection vial 0-4 Units, TID WC  insulin lispro (HUMALOG) injection vial 0-4 Units, Nightly  glucose chewable tablet 16 g, PRN  dextrose bolus 10% 125 mL, PRN   Or  dextrose bolus 10% 250 mL, PRN  glucagon (rDNA) injection 1 mg, PRN  dextrose 10 % infusion, Continuous PRN        Review of Systems:   Constitutional: negative for chills, fatigue, fevers, and malaise  Eyes: negative for icterus, irritation, redness, and visual disturbance  Ears, nose, mouth, throat, and face: negative for ear drainage, earaches, hearing loss, nasal congestion, sore mouth, sore throat, and tinnitus  Respiratory: negative for cough, dyspnea on exertion, hemoptysis, shortness of breath, and wheezing  Cardiovascular: negative for chest pain, chest pressure/discomfort, dyspnea, and palpitations  Gastrointestinal: negative for abdominal pain, diarrhea, melena, + nausea, and denies vomiting. Reports hematochezia   Genitourinary: negative for dysuria, frequency, and hematuria  Integument/breast: negative for pruritus, rash, and skin lesion(s)  Hematologic/lymphatic: negative for bleeding, easy bruising, and petechiae  Musculoskeletal: negative for arthralgias, muscle weakness, myalgias, and stiff joints  Neurological: negative for dizziness, headaches, vertigo, and weakness  Behavioral/Psych: negative for anxiety, depression, and irritability    Physical exam:  General Appearance:  awake, alert, oriented, in no acute distress  Skin:  Skin color, texture, turgor normal. Redness and dryness to lower extremities   Eyes:  PERRL, EOMI, Sclera nonicteric, and Conjunctiva clear  Ears:  canals and TMs intact  Nose/Sinuses:  Nares normal. Septum midline. Mucosa normal. No drainage or sinus tenderness. Mouth/Throat:  Mucosa moist.  No lesions. Neck:  neck- supple, no mass, non-tender  Lungs:  Normal expansion. Clear to auscultation, diminished in the bases. No rales, rhonchi, or wheezing. Heart:   Regular rate and rhythm without murmur, gallop or rub. Abdomen:  Soft, non-tender, normal bowel sounds. No bruits, organomegaly or masses. Extremities: Extremities warm to touch, pink, with +2 dependent edema. Musculoskeletal:  No joint swelling, deformity, or tenderness. Peripheral Pulses:  Normal  Neurologic: Sensation grossly intact.         Data:   Labs:  CBC with Differential:    Lab Results   Component Value Date/Time    WBC 8.9 01/31/2023 06:36 AM    RBC 2.79 01/31/2023 06:36 AM    HGB 8.6 01/31/2023 06:36 AM    HCT 27.6 01/31/2023 06:36 AM     01/31/2023 06:36 AM    MCV 98.9 01/31/2023 06:36 AM    MCH 30.8 01/31/2023 06:36 AM    MCHC 31.2 01/31/2023 06:36 AM    RDW 15.0 01/31/2023 06:36 AM    NRBC 0.0 01/13/2022 04:38 AM    LYMPHOPCT 12.4 01/31/2023 06:36 AM    MONOPCT 5.9 01/31/2023 06:36 AM    BASOPCT 0.7 01/31/2023 06:36 AM    MONOSABS 0.52 01/31/2023 06:36 AM LYMPHSABS 1.10 01/31/2023 06:36 AM    EOSABS 0.90 01/31/2023 06:36 AM    BASOSABS 0.06 01/31/2023 06:36 AM     CMP:    Lab Results   Component Value Date/Time     01/31/2023 06:36 AM    K 3.9 01/31/2023 06:36 AM    K 4.2 08/23/2022 12:12 PM    CL 97 01/31/2023 06:36 AM    CO2 27 01/31/2023 06:36 AM    BUN 33 01/31/2023 06:36 AM    CREATININE 6.5 01/31/2023 06:36 AM    GFRAA 8 09/22/2022 12:14 PM    LABGLOM 7 01/31/2023 06:36 AM    GLUCOSE 167 01/31/2023 06:36 AM    PROT 6.3 01/31/2023 06:36 AM    LABALBU 3.4 01/31/2023 06:36 AM    CALCIUM 8.4 01/31/2023 06:36 AM    BILITOT 0.4 01/31/2023 06:36 AM    ALKPHOS 78 01/31/2023 06:36 AM    AST 13 01/31/2023 06:36 AM    ALT 13 01/31/2023 06:36 AM     Ionized Calcium:  No results found for: IONCA  Magnesium:    Lab Results   Component Value Date/Time    MG 1.9 01/31/2023 06:36 AM     Phosphorus:    Lab Results   Component Value Date/Time    PHOS 5.2 01/31/2023 06:36 AM     U/A:    Lab Results   Component Value Date/Time    COLORU Yellow 11/18/2021 03:19 AM    PHUR 6.0 11/18/2021 03:19 AM    WBCUA 1-3 11/18/2021 03:19 AM    RBCUA 0-1 11/18/2021 03:19 AM    BACTERIA FEW 11/18/2021 03:19 AM    CLARITYU Clear 11/18/2021 03:19 AM    SPECGRAV 1.020 11/18/2021 03:19 AM    LEUKOCYTESUR Negative 11/18/2021 03:19 AM    UROBILINOGEN 0.2 11/18/2021 03:19 AM    BILIRUBINUR Negative 11/18/2021 03:19 AM    BLOODU SMALL 11/18/2021 03:19 AM    GLUCOSEU 500 11/18/2021 03:19 AM     Microalbumen/Creatinine ratio:  No components found for: RUCREAT  Iron Saturation:  No components found for: PERCENTFE  TIBC:    Lab Results   Component Value Date/Time    TIBC 173 01/18/2023 03:55 AM     FERRITIN:    Lab Results   Component Value Date/Time    FERRITIN 1,357 01/31/2023 06:35 AM        Imaging:  CT ABDOMEN PELVIS W IV CONTRAST Additional Contrast? None   Final Result   1. No acute abnormality is seen in the abdomen or the pelvis.    2. Minimal distal colonic diverticulosis without diverticulitis. 3. Prominent fat containing umbilical hernia. No evidence of fat   strangulation. 4. Cholelithiasis. 5. Small right pleural effusion. Assessment  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, renal microvascular atherosclerotic disease, and history of acute kidney injury due to contrast-induced nephropathy  Anemia due to ESRD  Secondary hyperparathyroidism/mineral bone disease due to ESRD  History of hypertension, typically well controlled    Plan  Continue IHD support for solute and volume clearance on Tuesdays Thursdays and Saturdays as per outpatient orders and dry weight  Continue JHONATAN: Retacrit while here  Continue other aspects of renal management  Calcium acetate 667 mg TID   Follow labs, BMP  Continue supportive care      Thank you for the opportunity to participate in the care of your pleasant patient. We look forward to following along with you. Pt seen and discussed   Agree with rekha mckinnon NP history , physical and plan .    Continue HD per schedule   Stop iv fluids   Fu with other services

## 2023-01-31 NOTE — PROGRESS NOTES
Physical Therapy  Facility/Department: [facility]  Physical Therapy Initial Assessment    Name: Mariia Carlos  : 1955  MRN: [id number]  Date of Service: 2023             Patient Diagnosis(es): The encounter diagnosis was Lower GI bleed. Past Medical History:  has a past medical history of Acute pulmonary embolism ([address] Anemia in CKD (chronic kidney disease), Anxiety and depression, At high risk for falls, Brain aneurysm, CLABSI (central line-associated bloodstream infection), Cough, Diabetes mellitus ([address] Diabetes mellitus type 2 with complications ([address] Dizziness on standing, End-stage renal disease on hemodialysis ([address] ESRD (end stage renal disease) ([address] ESRD on hemodialysis ([address] Essential hypertension, Fever, unspecified, Gastrointestinal hemorrhage, H/O heart artery stent, History of Clostridioides difficile colitis, Hyperlipidemia, Hypertension, Leg swelling, Lymphedema of both lower extremities, MSSA bacteremia, Nausea & vomiting, Obesity, Class III, BMI 40-49.9 (morbid obesity) ([address] Periumbilical abdominal pain, and S/P insertion of inferior vena caval filter. Past Surgical History:  has a past surgical history that includes Cardiac surgery;  section; vascular surgery (N/A, 2021); Upper gastrointestinal endoscopy (N/A, 2021); Dialysis fistula creation (Left, 2022); Colonoscopy (); Cardiac catheterization (); Percutaneous Transluminal Coronary Angio (); and Upper gastrointestinal endoscopy (N/A, 2023). Requires PT Follow-Up: Yes       Evaluating Therapist: Holly Clement PT     Referring Provider:  [address] [place] [address]    PT order : PT eval and treat     Room #: 648   DIAGNOSIS: The encounter diagnosis was Lower GI bleed  PRECAUTIONS: falls     Social:  Pt lives with  daughter  in a  1  floor plan  1 small step  to enter. Prior to admission pt walked with  ww, short distances.  Uses ww for longer distances       Initial Evaluation  Date:  1/31/2023  Treatment      Short Term/ Long Term   Goals   Was pt agreeable to Eval/treatment? Yes      Does pt have pain? B LEs, L > R      Bed Mobility  Rolling:  S/I   Supine to sit:  SBA   Sit to supine:  NT  Scooting:  SBA in sit    Independent    Transfers Sit to stand:  SBA   Stand to sit:  SBA   Stand pivot:  NT    Independent    Ambulation     4 0  feet with ww  with  SBA   100  feet with  ww  with  independent        Stair negotiation: ascended and descended NT    1-2  steps with  B  rail with  SBA    LE ROM  Decreased B hips, distally WFL      LE strength  3- to 3+/ 5 hips, 4-/ 5 distally      AM- PAC RAW score  17/ 24            Pt is alert and Oriented x  3      Balance:  SBA . Endurance: decreased   Bed/Chair alarm:  yes      ASSESSMENT  Pt displays functional ability as noted in the objective portion of this evaluation. Conditions Requiring Skilled Therapeutic Intervention:    [x]Decreased strength     [x]Decreased ROM  [x]Decreased functional mobility  [x]Decreased balance   [x]Decreased endurance   []Decreased posture  []Decreased sensation  []Decreased coordination   []Decreased vision  []Decreased safety awareness   [x]Increased pain         Treatment/Education:    Pt  in bed  upon arrival ; agreeable to PT. Mobility as above. Instructed pt in safety with O2 line management . No LOB with gait            Pt educated on fall risk,  safety with mobility        Patient response to education:   Pt verbalized understanding Pt demonstrated skill Pt requires further education in this area    x  With cues   x       Comments:  Pt left in chair after session, with call light in reach. Rehab potential is Good for reaching above PT goals. Pts/ family goals   1. Home     Patient and or family understand(s) diagnosis, prognosis, and plan of care. -  yes     PLAN  PT care will be provided in accordance with the objectives noted above.   Whenever appropriate, clear delegation orders will be provided for nursing staff. Exercises and functional mobility practice will be used as well as appropriate assistive devices or modalities to obtain goals. Patient and family education will also be administered as needed. PLAN OF CARE:    Current Treatment Recommendations     [x] Strengthening to improve independence with functional mobility   [] ROM to improve independence with functional mobility   [x] Balance Training to improve static/dynamic balance and to reduce fall risk  [x] Endurance Training to improve activity tolerance during functional mobility   [x] Transfer Training to improve safety and independence with all functional transfers   [x] Gait Training to improve gait mechanics, endurance and assess need for appropriate assistive device  [x] Stair Training in preparation for safe discharge home and/or into the community   [x] Positioning to prevent skin breakdown and contractures  [x] Safety and Education Training   [x] Patient/Caregiver Education   [] HEP  [] Other     Frequency of treatments will be 2-5x/week x 5-10 days. Time in: 1338   Time out:  1348      Evaluation Time includes thorough review of current medical information, gathering information on past medical history/social history and prior level of function, completion of standardized testing/informal observation of tasks, assessment of data and education on plan of care and goals.     CPT codes:  [x] Low Complexity PT evaluation 61711  [] Moderate Complexity PT evaluation 23453  [] High Complexity PT evaluation 82237  [] PT Re-evaluation 65276  [] Gait training 92297  minutes  [] Therapeutic activities 97150  minutes  [] Therapeutic exercises 95187 minutes  [] Neuromuscular reeducation 10770  minutes       Cheng 18 number:  PT [id number]

## 2023-01-31 NOTE — FLOWSHEET NOTE
Pt completed 4 hrs of HD with 1.7L of UF removed safely. 01/31/23 1140   Vital Signs   BP (!) 198/78   Temp 98.2 °F (36.8 °C)   Heart Rate 96   Resp 18   Weight 260 lb 5.8 oz (118.1 kg)   Weight Method Bed scale   Percent Weight Change -1.01   Pain Assessment   Pain Assessment None - Denies Pain   Pain Level 0   Post-Hemodialysis Assessment   Post-Treatment Procedures Blood returned;Catheter capped, clamped with Citrate x 2 ports   Machine Disinfection Process Acid/Vinegar Clean;Heat Disinfect; Exterior Machine Disinfection   Rinseback Volume (ml) 300 ml   Blood Volume Processed (Liters) 95.3 l/min   Dialyzer Clearance Clear   Duration of Treatment (minutes) 240 minutes   Hemodialysis Intake (ml) 300 ml   Hemodialysis Output (ml) 2000 ml   NET Removed (ml) 1700   Tolerated Treatment Good   Patient Response to Treatment tolerated well   Bilateral Breath Sounds Diminished   Time Off 1132   Patient Disposition Return to room

## 2023-01-31 NOTE — PROGRESS NOTES
[facility] Quality Flow/Interdisciplinary Rounds Progress Note        Quality Flow Rounds held on January 31, 2023    Disciplines Attending:  Bedside Nurse, , , and Nursing Unit Leadership    Onelia Dutton was admitted on 1/30/2023 11:13 AM    Anticipated Discharge Date:       Disposition:    Dm Score:  Dm Scale Score: 19    Readmission Risk              Risk of Unplanned Readmission:  31           Discussed patient goal for the day, patient clinical progression, and barriers to discharge. The following Goal(s) of the Day/Commitment(s) have been identified:   Monitor Hgb and discharge planning home.       Pina Aguilar RN  January 31, 2023

## 2023-02-01 ENCOUNTER — COMMUNITY OUTREACH (OUTPATIENT)
Dept: PRIMARY CARE CLINIC | Age: 68
End: 2023-02-01

## 2023-02-01 VITALS
HEART RATE: 67 BPM | HEIGHT: 64 IN | DIASTOLIC BLOOD PRESSURE: 57 MMHG | RESPIRATION RATE: 18 BRPM | BODY MASS INDEX: 44.45 KG/M2 | TEMPERATURE: 97.9 F | WEIGHT: 260.36 LBS | SYSTOLIC BLOOD PRESSURE: 142 MMHG | OXYGEN SATURATION: 100 %

## 2023-02-01 LAB
ALBUMIN SERPL-MCNC: 3.4 G/DL (ref 3.5–5.2)
ALP BLD-CCNC: 88 U/L (ref 35–104)
ALT SERPL-CCNC: 12 U/L (ref 0–32)
ANION GAP SERPL CALCULATED.3IONS-SCNC: 10 MMOL/L (ref 7–16)
AST SERPL-CCNC: 13 U/L (ref 0–31)
BILIRUB SERPL-MCNC: 0.4 MG/DL (ref 0–1.2)
BILIRUBIN DIRECT: <0.2 MG/DL (ref 0–0.3)
BILIRUBIN, INDIRECT: ABNORMAL MG/DL (ref 0–1)
BUN BLDV-MCNC: 18 MG/DL (ref 6–23)
C-REACTIVE PROTEIN: 8.8 MG/DL (ref 0–0.4)
CALCIUM SERPL-MCNC: 8.1 MG/DL (ref 8.6–10.2)
CHLORIDE BLD-SCNC: 95 MMOL/L (ref 98–107)
CO2: 32 MMOL/L (ref 22–29)
CREAT SERPL-MCNC: 4.4 MG/DL (ref 0.5–1)
GFR SERPL CREATININE-BSD FRML MDRD: 10 ML/MIN/1.73
GLUCOSE BLD-MCNC: 278 MG/DL (ref 74–99)
HCT VFR BLD CALC: 27.6 % (ref 34–48)
HEMOGLOBIN: 8.6 G/DL (ref 11.5–15.5)
LACTIC ACID, SEPSIS: 1.2 MMOL/L (ref 0.5–1.9)
MAGNESIUM: 2.1 MG/DL (ref 1.6–2.6)
METER GLUCOSE: 133 MG/DL (ref 74–99)
METER GLUCOSE: 338 MG/DL (ref 74–99)
PHOSPHORUS: 3.8 MG/DL (ref 2.5–4.5)
POTASSIUM SERPL-SCNC: 4.3 MMOL/L (ref 3.5–5)
SEDIMENTATION RATE, ERYTHROCYTE: 65 MM/HR (ref 0–20)
SODIUM BLD-SCNC: 137 MMOL/L (ref 132–146)
TOTAL PROTEIN: 6.2 G/DL (ref 6.4–8.3)

## 2023-02-01 PROCEDURE — 83605 ASSAY OF LACTIC ACID: CPT

## 2023-02-01 PROCEDURE — 80048 BASIC METABOLIC PNL TOTAL CA: CPT

## 2023-02-01 PROCEDURE — 97530 THERAPEUTIC ACTIVITIES: CPT

## 2023-02-01 PROCEDURE — C9113 INJ PANTOPRAZOLE SODIUM, VIA: HCPCS | Performed by: STUDENT IN AN ORGANIZED HEALTH CARE EDUCATION/TRAINING PROGRAM

## 2023-02-01 PROCEDURE — 86140 C-REACTIVE PROTEIN: CPT

## 2023-02-01 PROCEDURE — 6370000000 HC RX 637 (ALT 250 FOR IP): Performed by: INTERNAL MEDICINE

## 2023-02-01 PROCEDURE — 82962 GLUCOSE BLOOD TEST: CPT

## 2023-02-01 PROCEDURE — 84100 ASSAY OF PHOSPHORUS: CPT

## 2023-02-01 PROCEDURE — 2580000003 HC RX 258: Performed by: STUDENT IN AN ORGANIZED HEALTH CARE EDUCATION/TRAINING PROGRAM

## 2023-02-01 PROCEDURE — 36415 COLL VENOUS BLD VENIPUNCTURE: CPT

## 2023-02-01 PROCEDURE — 2500000003 HC RX 250 WO HCPCS: Performed by: INTERNAL MEDICINE

## 2023-02-01 PROCEDURE — 6370000000 HC RX 637 (ALT 250 FOR IP): Performed by: STUDENT IN AN ORGANIZED HEALTH CARE EDUCATION/TRAINING PROGRAM

## 2023-02-01 PROCEDURE — 85014 HEMATOCRIT: CPT

## 2023-02-01 PROCEDURE — 6360000002 HC RX W HCPCS: Performed by: STUDENT IN AN ORGANIZED HEALTH CARE EDUCATION/TRAINING PROGRAM

## 2023-02-01 PROCEDURE — 83735 ASSAY OF MAGNESIUM: CPT

## 2023-02-01 PROCEDURE — 2700000000 HC OXYGEN THERAPY PER DAY

## 2023-02-01 PROCEDURE — 85018 HEMOGLOBIN: CPT

## 2023-02-01 PROCEDURE — 80076 HEPATIC FUNCTION PANEL: CPT

## 2023-02-01 PROCEDURE — 85651 RBC SED RATE NONAUTOMATED: CPT

## 2023-02-01 RX ORDER — PEN NEEDLE, DIABETIC 29 GAUGE
1 NEEDLE, DISPOSABLE MISCELLANEOUS DAILY
Qty: 100 EACH | Refills: 3 | Status: SHIPPED | OUTPATIENT
Start: 2023-02-01

## 2023-02-01 RX ORDER — INSULIN LISPRO 100 [IU]/ML
4 INJECTION, SOLUTION INTRAVENOUS; SUBCUTANEOUS
Qty: 1 EACH | Refills: 3 | Status: SHIPPED | OUTPATIENT
Start: 2023-02-01

## 2023-02-01 RX ORDER — INSULIN LISPRO 100 [IU]/ML
0-4 INJECTION, SOLUTION INTRAVENOUS; SUBCUTANEOUS NIGHTLY
Qty: 1 EACH | Refills: 3 | Status: SHIPPED | OUTPATIENT
Start: 2023-02-01

## 2023-02-01 RX ORDER — INSULIN GLARGINE 100 [IU]/ML
20 INJECTION, SOLUTION SUBCUTANEOUS NIGHTLY
Qty: 10 ML | Refills: 3 | Status: SHIPPED | OUTPATIENT
Start: 2023-02-01

## 2023-02-01 RX ORDER — INSULIN LISPRO 100 [IU]/ML
0-8 INJECTION, SOLUTION INTRAVENOUS; SUBCUTANEOUS
Qty: 1 EACH | Refills: 2 | Status: SHIPPED | OUTPATIENT
Start: 2023-02-01

## 2023-02-01 RX ADMIN — ACETAMINOPHEN 650 MG: 325 TABLET ORAL at 03:59

## 2023-02-01 RX ADMIN — INSULIN LISPRO 4 UNITS: 100 INJECTION, SOLUTION INTRAVENOUS; SUBCUTANEOUS at 06:50

## 2023-02-01 RX ADMIN — CALCIUM ACETATE 667 MG: 667 CAPSULE ORAL at 11:45

## 2023-02-01 RX ADMIN — SODIUM CHLORIDE, PRESERVATIVE FREE 10 ML: 5 INJECTION INTRAVENOUS at 08:00

## 2023-02-01 RX ADMIN — ROSUVASTATIN CALCIUM 5 MG: 5 TABLET, FILM COATED ORAL at 10:19

## 2023-02-01 RX ADMIN — INSULIN LISPRO 6 UNITS: 100 INJECTION, SOLUTION INTRAVENOUS; SUBCUTANEOUS at 06:50

## 2023-02-01 RX ADMIN — CALCIUM ACETATE 667 MG: 667 CAPSULE ORAL at 10:19

## 2023-02-01 RX ADMIN — PANTOPRAZOLE SODIUM 40 MG: 40 INJECTION, POWDER, FOR SOLUTION INTRAVENOUS at 05:52

## 2023-02-01 ASSESSMENT — PAIN DESCRIPTION - LOCATION: LOCATION: BUTTOCKS

## 2023-02-01 ASSESSMENT — PAIN SCALES - GENERAL
PAINLEVEL_OUTOF10: 3
PAINLEVEL_OUTOF10: 0

## 2023-02-01 NOTE — PROGRESS NOTES
PROGRESS  NOTE --                                                          INTERNAL  MEDICINE                                                                              I  PERSONALLY SAW , EXAMINED, AND CARED Shanique 124, 2/1/2023     LABS, XRAY ,CHART, AND MEDICATIONS  REVIEWED BY ME       Chief complaint: Bright red rectal bleeding      2/1/2023-SUBJECTIVE: Yasmeen Guillory is alert awake and cooperative; oriented ×3. Denies any chest pain dyspnea nausea emesis. Tolerating diet. Sitting in bedside chair; she feels constipated took a dose of prune juice now having some abdominal cramping. She is hoping for discharge today. Afebrile last 24 hours. Blood pressure 142/57. SPO2 100 on 2 L nasal cannula. Intake and output -1700 cc. CO2 32 BUN 18 creatinine 4.4 fasting glucose 278 calcium 8.1 protein 6.2 albumin 3.4 liver functions normal.  Hemoglobin 8.6. It was 8.9 on admission. Physical therapy AMPAC score from yesterday 17/24. Endocrinology consult initially appreciated. Begin Lantus 20 units likely Humalog 4 units 3 times daily with meals. Medium dose sliding scale insulin. \"Daughter is to stop at our office tomorrow to  free insulin. Follow-up endocrinology clinic upon discharge. \"  Surgical note from today, no plans for procedures follow-up outpatient colonoscopy. Okay for diet, call for any other concerns. Physical therapy AMPAC score today 17/24.       Objective:     PHYSICAL EXAM:    VS: BP (!) 142/57   Pulse 67   Temp 97.9 °F (36.6 °C) (Oral)   Resp 18   Ht 5' 4\" (1.626 m)   Wt 260 lb 5.8 oz (118.1 kg)   SpO2 100%   BMI 44.69 kg/m²     Labs:   CBC:   Lab Results   Component Value Date/Time    WBC 8.9 01/31/2023 06:36 AM    RBC 2.79 01/31/2023 06:36 AM    HGB 8.6 02/01/2023 04:00 AM    HCT 27.6 02/01/2023 04:00 AM    MCV 98.9 01/31/2023 06:36 AM    MCH 30.8 01/31/2023 06:36 AM    MCHC 31.2 01/31/2023 06:36 AM    RDW 15.0 01/31/2023 06:36 AM     01/31/2023 06:36 AM    MPV 10.7 01/31/2023 06:36 AM     CBC with Differential:    Lab Results   Component Value Date/Time    WBC 8.9 01/31/2023 06:36 AM    RBC 2.79 01/31/2023 06:36 AM    HGB 8.6 02/01/2023 04:00 AM    HCT 27.6 02/01/2023 04:00 AM     01/31/2023 06:36 AM    MCV 98.9 01/31/2023 06:36 AM    MCH 30.8 01/31/2023 06:36 AM    MCHC 31.2 01/31/2023 06:36 AM    RDW 15.0 01/31/2023 06:36 AM    NRBC 0.0 01/13/2022 04:38 AM    LYMPHOPCT 12.4 01/31/2023 06:36 AM    MONOPCT 5.9 01/31/2023 06:36 AM    BASOPCT 0.7 01/31/2023 06:36 AM    MONOSABS 0.52 01/31/2023 06:36 AM    LYMPHSABS 1.10 01/31/2023 06:36 AM    EOSABS 0.90 01/31/2023 06:36 AM    BASOSABS 0.06 01/31/2023 06:36 AM     Hemoglobin/Hematocrit:    Lab Results   Component Value Date/Time    HGB 8.6 02/01/2023 04:00 AM    HCT 27.6 02/01/2023 04:00 AM     CMP:    Lab Results   Component Value Date/Time     02/01/2023 04:00 AM    K 4.3 02/01/2023 04:00 AM    K 4.2 08/23/2022 12:12 PM    CL 95 02/01/2023 04:00 AM    CO2 32 02/01/2023 04:00 AM    BUN 18 02/01/2023 04:00 AM    CREATININE 4.4 02/01/2023 04:00 AM    GFRAA 8 09/22/2022 12:14 PM    LABGLOM 10 02/01/2023 04:00 AM    GLUCOSE 278 02/01/2023 04:00 AM    PROT 6.2 02/01/2023 04:00 AM    LABALBU 3.4 02/01/2023 04:00 AM    CALCIUM 8.1 02/01/2023 04:00 AM    BILITOT 0.4 02/01/2023 04:00 AM    ALKPHOS 88 02/01/2023 04:00 AM    AST 13 02/01/2023 04:00 AM    ALT 12 02/01/2023 04:00 AM     BMP:    Lab Results   Component Value Date/Time     02/01/2023 04:00 AM    K 4.3 02/01/2023 04:00 AM    K 4.2 08/23/2022 12:12 PM    CL 95 02/01/2023 04:00 AM    CO2 32 02/01/2023 04:00 AM    BUN 18 02/01/2023 04:00 AM    LABALBU 3.4 02/01/2023 04:00 AM    CREATININE 4.4 02/01/2023 04:00 AM    CALCIUM 8.1 02/01/2023 04:00 AM    GFRAA 8 09/22/2022 12:14 PM    LABGLOM 10 02/01/2023 04:00 AM    GLUCOSE 278 02/01/2023 04:00 AM     Hepatic Function Panel:    Lab Results   Component Value Date/Time    ALKPHOS 88 02/01/2023 04:00 AM    ALT 12 02/01/2023 04:00 AM    AST 13 02/01/2023 04:00 AM    PROT 6.2 02/01/2023 04:00 AM    BILITOT 0.4 02/01/2023 04:00 AM    BILIDIR <0.2 02/01/2023 04:00 AM    IBILI see below 02/01/2023 04:00 AM    LABALBU 3.4 02/01/2023 04:00 AM     Ionized Calcium:  No results found for: IONCA  Magnesium:    Lab Results   Component Value Date/Time    MG 2.1 02/01/2023 04:00 AM     Phosphorus:    Lab Results   Component Value Date/Time    PHOS 3.8 02/01/2023 04:00 AM     LDH:    Lab Results   Component Value Date/Time     01/11/2022 12:45 PM     Uric Acid:    Lab Results   Component Value Date/Time    LABURIC 7.3 01/31/2023 06:36 AM     PT/INR:    Lab Results   Component Value Date/Time    PROTIME 12.0 01/31/2023 06:36 AM    INR 1.1 01/31/2023 06:36 AM     Warfarin PT/INR:  No components found for: PTPATWAR, PTINRWAR  PTT:    Lab Results   Component Value Date/Time    APTT 34.5 01/17/2023 08:35 AM   [APTT}  Troponin:  No results found for: TROPONINI  Last 3 Troponin:  No results found for: TROPONINI  U/A:    Lab Results   Component Value Date/Time    COLORU Yellow 11/18/2021 03:19 AM    PROTEINU >=300 11/18/2021 03:19 AM    PHUR 6.0 11/18/2021 03:19 AM    WBCUA 1-3 11/18/2021 03:19 AM    RBCUA 0-1 11/18/2021 03:19 AM    BACTERIA FEW 11/18/2021 03:19 AM    CLARITYU Clear 11/18/2021 03:19 AM    SPECGRAV 1.020 11/18/2021 03:19 AM    LEUKOCYTESUR Negative 11/18/2021 03:19 AM    UROBILINOGEN 0.2 11/18/2021 03:19 AM    BILIRUBINUR Negative 11/18/2021 03:19 AM    BLOODU SMALL 11/18/2021 03:19 AM    GLUCOSEU 500 11/18/2021 03:19 AM     HgBA1c:    Lab Results   Component Value Date/Time    LABA1C 11.0 01/31/2023 06:35 AM     FLP:    Lab Results   Component Value Date/Time    TRIG 143 01/31/2023 06:36 AM    HDL 43 01/31/2023 06:36 AM    LDLCALC 100 01/31/2023 06:36 AM    LABVLDL 29 01/31/2023 06:36 AM     TSH:    Lab Results   Component Value Date/Time    TSH 1.720 01/31/2023 06:36 AM     VITAMIN B12: No components found for: B12  FOLATE:    Lab Results   Component Value Date/Time    FOLATE 9.3 01/31/2023 06:35 AM     IRON:    Lab Results   Component Value Date/Time    IRON 45 01/31/2023 06:35 AM     Iron Saturation:  No components found for: PERCENTFE  TIBC:    Lab Results   Component Value Date/Time    TIBC 173 01/31/2023 06:35 AM     FERRITIN:    Lab Results   Component Value Date/Time    FERRITIN 1,357 01/31/2023 06:35 AM        General appearance: Alert, Awake, Oriented times 3, no distress; nasal cannula oxygen in place, morbidly obese  Skin: Warm and dry ; no rashes  Head: Normocephalic. No masses, lesions or tenderness noted  Eyes: Conjunctivae pale, sclera white. PERRL,EOM-I  Ears: External ears normal  Nose/Sinuses: Nares normal. Septum midline. Mucosa normal. No drainage  Oropharynx: Oropharynx clear with no exudate seen  Neck: Supple. No jugular venous distension, lymphadenopathy or thyromegaly Trachea midline  Lungs: Clear to auscultation bilaterally. No rhonchi, crackles or wheezes  Heart: S1 S2  Regular rate and rhythm. No rub, gallop, murmur  Abdomen: Soft, non-tender. BS normal. No masses, organomegaly; no rebound or guarding  Extremities: 2-3+ bilateral edema  Musculoskeletal: Muscular strength appears intact. Neuro:  No focal motor defects ; II-XII grossly intact . MORRIS equally    TELEMETRY: REVIEWED--Telemetry: Sinus    ASSESSMENT:   Principal Problem:    Rectal bleeding  Active Problems:    H/O heart artery stent    Diabetes mellitus (HCC)    End-stage renal disease on hemodialysis (HCC)    Lymphedema of both lower extremities    BRBPR (bright red blood per rectum)    Diabetes mellitus type 2 with complications (HCC)    Pulmonary hypertension, unspecified (HCC)    Obesity, Class III, BMI 40-49.9 (morbid obesity) ([facility] [address]  Resolved Problems:    * No resolved hospital problems.  *      PLAN:  SEE ORDERS      RE  CHANGES AND FINDINGS   Medications reviewed with patient  GI prophylaxis  DVT prophylaxis  Consultants notes reviewed    MED reconciliation completed  Prescriptions written  Follow-up Dr. Ross Brewer 7 to 10 days  Follow-up endocrinology per their instructions  Lantus 20 units nightly  Humalog 4 units with meals  Medium dose sliding scale insulin  Daughter is to stop and  free insulin at endocrine office      Discussed with patient and nursing. Doug Anne      7:48 AM     2/1/2023    TIME > 35 MINUTES    Please note that over 35 minutes was spent . Greater than 51% includes interviewing patient and reviewing chart; performing medical examination; ordering medications, tests and/or procedures; formulating assessment plan, reviewing rationale for the above recommendations; reviewing available records, results of all previously ordered tests and procedures and current problem list; counseling/educating the patient; coordinating care with other healthcare professionals; communicating results to the patient's family/caregiver; documenting clinical information in the electronic record                ------------  INFORMATION  -----------      DIET:ADULT DIET; Regular; 4 carb choices (60 gm/meal); Low Fat/Low Chol/High Fiber/CHELSEA        Allergies   Allergen Reactions    Pcn [Penicillins] Shortness Of Breath and Rash    Benzonatate Other (See Comments)     \"PATIENTS STATES \"IT WAS LIKE I WAS DRUNK. \"    Morphine Itching and Rash    Sodium Hypochlorite Nausea And Vomiting and Rash     AKA BLEACH.   RASH FROM SKIN EXPOSURE          MEDICATION SIDE EFFECTS:none       SCHEDULED MEDS:                                 Scheduled Meds:   epoetin sadia-epbx  30 Units/kg SubCUTAneous Once per day on Mon Wed Fri    insulin glargine  20 Units SubCUTAneous Nightly    insulin lispro  0-8 Units SubCUTAneous TID     insulin lispro  0-4 Units SubCUTAneous Nightly    insulin lispro  4 Units SubCUTAneous TID     sodium chloride flush  5-40 mL IntraVENous 2 times per day    pantoprazole  40 mg IntraVENous BID    calcium acetate  667 mg Oral TID     rosuvastatin  5 mg Oral Daily       Continuous Infusions:   sodium chloride      dextrose           Data:       Intake/Output Summary (Last 24 hours) at 2/1/2023 0748  Last data filed at 1/31/2023 1140  Gross per 24 hour   Intake 300 ml   Output 2000 ml   Net -1700 ml       Wt Readings from Last 3 Encounters:   01/31/23 260 lb 5.8 oz (118.1 kg)   01/30/23 262 lb (118.8 kg)   01/21/23 255 lb 11.7 oz (116 kg)       Labs: Additional    GLUCOSE:No results for input(s): POCGLU in the last 72 hours. BNP:No results found for: BNP    CRP:   Recent Labs     01/31/23  0635   CRP 5.3*       ESR:  Recent Labs     01/31/23  0636   SEDRATE 70*       RADIOLOGY: REVIEWED AVAILABLE REPORT  CT ABDOMEN PELVIS W IV CONTRAST Additional Contrast? None   Final Result   1. No acute abnormality is seen in the abdomen or the pelvis. 2. Minimal distal colonic diverticulosis without diverticulitis. 3. Prominent fat containing umbilical hernia. No evidence of fat   strangulation. 4. Cholelithiasis. 5. Small right pleural effusion.                Heidy Khan DO    7:48 AM     2/1/2023      Voice recognition software used for dictation

## 2023-02-01 NOTE — PROGRESS NOTES
Physical Therapy  Facility/Department: [facility] MED SURG  Daily Treatment Note  NAME: Rosanna Sahu  : 1955  MRN: [id number]    Date of Service: 2023    Patient Diagnosis(es): The encounter diagnosis was Lower GI bleed. Evaluating Therapist: Ally Eldridge PT      Referring Provider:  Yasmeen Chester DO     PT order : PT eval and treat      Room #: 637   DIAGNOSIS: The encounter diagnosis was Lower GI bleed  PRECAUTIONS: falls      Social:  Pt lives with  daughter  in a  1  floor plan  1 small step  to enter. Prior to admission pt walked with  ww, short distances. Uses ww for longer distances         Initial Evaluation  Date:  2023  Treatment      Short Term/ Long Term   Goals   Was pt agreeable to Eval/treatment? Yes   yes     Does pt have pain? B LEs, L > R   left LE     Bed Mobility  Rolling:  S/I   Supine to sit:  SBA   Sit to supine:  NT  Scooting:  SBA in sit   NT - pt sitting in the chair  Independent    Transfers Sit to stand:  SBA   Stand to sit:  SBA   Stand pivot:  NT   sit to stand:  SBA  Stand to sit:  SBA  Independent    Ambulation     4 0  feet with ww  with  SBA   40 feet and 30 feet with w/w   feet with  ww  with  independent    Stair negotiation: ascended and descended NT   NT  1-2  steps with  B  rail with  SBA    LE ROM  Decreased B hips, distally WFL        LE strength  3- to 3+/ 5 hips, 4-/ 5 distally        AM- PAC RAW score  17/ 24   17/24        Vitals:  SPO2 lowest was 88% on O2 after first time ambulating. Recovered into the 90s with deep breathing. Therapeutic Exercises:  lung expansion exercises performed with the pt while sitting up in the chair. Patient education  Pt educated on PT objectives during treatment session, lung expansion exercises, deep breathing.      Patient response to education:   Pt verbalized understanding Pt demonstrated skill Pt requires further education in this area   yes With cueing yes     ASSESSMENT:    Comments:  Pt found and left sitting up in the chair with call light in reach. Treatment:  Patient practiced and was instructed in the following treatment:   Functional mobility and therapeutic exercises performed as documented above. No report of dizziness during functional mobility. No LOB during ambulation. Pt required seated rest break between ambulation trials due to SOB. PLAN:    Patient is making good progress towards established goals. Will continue with current POC.      Time in  1125  Time out  1145    Total Treatment Time  20 minutes     CPT codes:    [x] Therapeutic activities 63373 15 minutes  [] Therapeutic exercises 08289  5 minutes      Steven Arora, [phone]

## 2023-02-01 NOTE — PROGRESS NOTES
Complete discharge instructions given to patient and daughter at bedside. Reviewed all medications, discharge follow ups and instructions. All question and concerns addressed. Advised patient and daughter to follow ASAP with diabetic educator as previously prescribed upon prior admission and to follow prescription insulin regime as prescribed by Endocrinology. All questions regarding prescription issues direct towards pharmacy and insurance. All questions regarding insulin prescriptions direct towards Endocrinology. Patient and daughter unaware of what was supposed to be followed at home from previous admission due lack of supplies. IV site and telemetry removed. Transport notified of discharge to main entrance.

## 2023-02-01 NOTE — PROGRESS NOTES
Patient discharged without medications. Scripts placed on hold. LVM informing patient to have her pharmacy call us for a transfer.

## 2023-02-01 NOTE — PROGRESS NOTES
GENERAL SURGERY  DAILY PROGRESS NOTE  2/1/2023    Subjective:  No new complaints or overnight events. Hgb 8.6 from 8.9    Objective:  BP (!) 142/57   Pulse 67   Temp 97.9 °F (36.6 °C) (Oral)   Resp 18   Ht 5' 4\" (1.626 m)   Wt 260 lb 5.8 oz (118.1 kg)   SpO2 100%   BMI 44.69 kg/m²     General Appearance:  awake, alert, oriented, in no acute distress  Skin:  Skin color, texture, turgor normal  Head/face:  NCAT  Eyes:  No gross abnormalities. Sclera nonicteric  Lungs/Chest:  Normal expansion. No respiratory distress. Heart: Warm throughout. Regular rate   Abdomen:  Soft, no tenderness, morbidly obese. Umbilical hernia without bowel is present    Extremities: Extremities warm to touch, pink, with no edema. I have personally reviewed all relevant labs and imaging. Assessment/Plan:  78 y.o. female with bright red blood per rectum after recent upper GI bleed.     - no plans for procedure at this point  - patient may follow ups as outpatient for colonoscopy if hemoglobin remains stable  - okay for diet from surgical standpoint  - Please call with any further questions or concerns     Electronically signed by Jody Thomas DO on 2/1/2023 at 8:08 AM

## 2023-02-01 NOTE — PROGRESS NOTES
[facility] MD Willy Birch, MD Neelam Prince, MD Melida De Jesus, MD Shin Prater, CNP   Jaja Echavarria, YOSHI  Progress Note    2/1/2023    SUBJECTIVE:   2/1:  Feels a bit weak. No rectal bleeding noted today. Appetite is OK. Denies nausea or vomiting. PROBLEM LIST:    Principal Problem:    Rectal bleeding  Active Problems:    H/O heart artery stent    Diabetes mellitus (HCC)    End-stage renal disease on hemodialysis (HCC)    Lymphedema of both lower extremities    BRBPR (bright red blood per rectum)    Diabetes mellitus type 2 with complications (HCC)    Pulmonary hypertension, unspecified (HCC)    Obesity, Class III, BMI 40-49.9 (morbid obesity) ([address]  Resolved Problems:    * No resolved hospital problems. *         DIET:    ADULT DIET; Regular; 4 carb choices (60 gm/meal);  Low Fat/Low Chol/High Fiber/CHELSEA     MEDS (scheduled):    [START ON 2/2/2023] epoetin sadia-epbx  30 Units/kg SubCUTAneous Once per day on Tue Thu Sat    insulin glargine  20 Units SubCUTAneous Nightly    insulin lispro  0-8 Units SubCUTAneous TID WC    insulin lispro  0-4 Units SubCUTAneous Nightly    insulin lispro  4 Units SubCUTAneous TID WC    sodium chloride flush  5-40 mL IntraVENous 2 times per day    pantoprazole  40 mg IntraVENous BID    calcium acetate  667 mg Oral TID WC    rosuvastatin  5 mg Oral Daily       MEDS (infusions):   sodium chloride      dextrose         MEDS (prn):  sodium chloride flush, sodium chloride, ondansetron **OR** ondansetron, polyethylene glycol, acetaminophen **OR** acetaminophen, midodrine, glucose, dextrose bolus **OR** dextrose bolus, glucagon (rDNA), dextrose    PHYSICAL EXAM:     Patient Vitals for the past 24 hrs:   BP Temp Temp src Pulse Resp SpO2   02/01/23 0737 (!) 142/57 97.9 °F (36.6 °C) Oral 67 18 100 %   01/31/23 1959 107/75 98.4 °F (36.9 °C) Oral 72 18 97 %   01/31/23 1524 126/62 99.3 °F (37.4 °C) Oral 71 18 98 %   @    No intake or output data in the 24 hours ending 02/01/23 1454      Wt Readings from Last 3 Encounters:   01/31/23 260 lb 5.8 oz (118.1 kg)   01/30/23 262 lb (118.8 kg)   01/21/23 255 lb 11.7 oz (116 kg)       Constitutional:  in no acute distress  HEENT: NC/AT, EOMI, sclera and conjunctiva are clear and anicteric, mucus membranes moist  Neck: Trachea midline, no JVD  Cardiovascular: S1, S2 regular rhythm, no murmur,or rub  Respiratory:  CTAB. No crackles, no wheeze  Gastrointestinal:  Soft, nontender, nondistended, NABS  Ext: 2+ BLE edema, feet warm  Skin: dry, no rash  Neuro: awake, alert, interactive      DATA:    Recent Labs     01/30/23  1246 01/30/23  1950 01/31/23  0636 01/31/23  1730 02/01/23  0400   WBC 10.0  --  8.9  --   --    HGB 8.9*   < > 8.6* 8.9* 8.6*   HCT 28.3*   < > 27.6* 27.8* 27.6*   MCV 98.6  --  98.9  --   --      --  145  --   --     < > = values in this interval not displayed. 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       PLAN:  -OK for discharge from renal standpoint  -Resume outpatient dialysis care at [facility] on TTS schedule  -Will continue to follow care there as an outpt  -Continue current renal care    Electronically signed by TAWNYA Mcgovern - CASH on 2/1/2023 at 2:54 PM

## 2023-02-01 NOTE — CARE COORDINATION
Transition of Care-Hospital Day #2-met with patient a the bedside, introduced myself and Cm role in discharge planning. Patient stated she lives with her daughter in a one story home, uses a walker to ambulate and a wheelchair for outings. She is currently requiring 2L of oxygen, she wears 2L of oxygen at home, provided by [facility]. She goes to dialysis [facility]-please call facility day of discharge to coordinate uninterrupted care #[phone]. PCP is Dr. Leo Hamilton, preferred pharmacy is [address] [place] [address]. Daughter will transport home. Patient's discharge plan is to return home with current support systems in place, does not anticipate any needs.      Janice ISRAELN, RN  [facility]

## 2023-02-01 NOTE — CARE COORDINATION
Discharge order noted, call placed to [address] [place] [address] she is discharging-will put her on the schedule for tomorrow.      Electronically signed by Sophy Valentin RN on 2/1/2023 at 2:32 PM

## 2023-02-01 NOTE — PROGRESS NOTES
Multiple calls to Dr Nuñez Done office and staff regarding prescription status, pharmacy, and availability. Dr Jenn Zuniga called this nurse personally on the unit and states the office only has limited supplies for patients. Staff has given them all products available at this time. Prescription for pen needles have been sent to patient's home pharmacy. Staff states all cost is covered 100% with patients insurance. Patient updated and dissatisfied with staff at office and Dr Jenn Zuniga stating they were provided with a different information yesterday. Daughter and son called to obtain pharmacy number. Unable to reach.

## 2023-02-01 NOTE — PROGRESS NOTES
Daughter returned call to nurse. Updated prescription status at home pharmacy. Again stated they were provided different information regarding pen needles and prescription. Advised this RN spoke to office staff and Dr Josefa Lema and provided all information obtained form telephone conversations. Patient and daughter made aware of all information at this time.

## 2023-02-01 NOTE — PROGRESS NOTES
Call placed to [facility], voice message left alerting facility that patient was discharged today.

## 2023-02-01 NOTE — CONSULTS
ENDOCRINOLOGY INITIAL CONSULTATION NOTE      Date of admission: 1/30/2023  Date of service: 1/31/2023  Admitting physician: Sendy Dewitt DO   Primary Care Physician: Cheko Toledo MD  Consultant physician: Aleksandar Horne MD     Reason for the consultation:  Uncontrolled DM    History of Present Illness: The history is provided by the patient. Accuracy of the patient data is excellent    Luz Valera is a very pleasant 78 y.o. old female with PMH of DM type 2, ESRD of HD and other listed below admitted to [facility] on 1/30/2023 because of two days h/o bright red rectal bleeding , endocrine service was consulted for diabetes management. The patient was recently admitted to this hospital on 1/17/2023 with hematemesis. She had been on apixaban which was started in early 12/2022 due to the finding of pulmonary embolus. At the time of hematemesis, the apixaban was discontinued. Pt denies fever, chills, CP or SOB       Prior to admission  The patient was diagnosed with type 2 DM long time ago. Prior to admission patient wasn't able to get her long acting and short acting insulin due to insurance coverage. Patient has not been eating consistent carbohydrate meals, self-blood glucose monitoring has been above goal prior to admission. In addition, patient denied macrovascular or microvascular complications.  The patient is not up to date with yearly diabetic eye exam.   Lab Results   Component Value Date/Time    LABA1C 11.0 01/31/2023 06:35 AM     Inpatient diet:   Carb Restricted diet     Point of care glucose monitoring   (Independently reviewed)   Recent Labs     01/30/23 2008 01/31/23  0626 01/31/23  1612   GLUMET 353* 179* 138*       Past medical history:   Past Medical History:   Diagnosis Date    Acute pulmonary embolism ([address] 12/03/2022    Anemia in CKD (chronic kidney disease) 11/30/2021    Anxiety and depression 01/19/2022    At high risk for falls 01/19/2022    Brain aneurysm     CLABSI (central line-associated bloodstream infection) 11/19/2021    Cough 01/19/2022    Diabetes mellitus ([facility] 2006    Diabetes mellitus type 2 with complications ([facility] 80/92/2822    Dizziness on standing 01/19/2022    End-stage renal disease on hemodialysis ([facility] 01/19/2023    ESRD (end stage renal disease) ([facility] 11/19/2021    ESRD on hemodialysis ([facility] 11/19/2021    Essential hypertension 11/30/2021    Fever, unspecified     Gastrointestinal hemorrhage 11/30/2021    H/O heart artery stent 2015    Done in [place]    History of Clostridioides difficile colitis 01/19/2022    Hyperlipidemia     Hypertension     Leg swelling 01/19/2023    Lymphedema of both lower extremities 01/19/2023    MSSA bacteremia 11/18/2021    Nausea & vomiting 11/18/2021    Obesity, Class III, BMI 40-49.9 (morbid obesity) ([facility] 49/21/5081    Periumbilical abdominal pain     S/P insertion of inferior vena caval filter 01/20/2023       Past surgical history:  Past Surgical History:   Procedure Laterality Date    CARDIAC CATHETERIZATION  2015    Done in [address] [place] [address]  2017    Negative findings    DIALYSIS FISTULA CREATION Left 01/28/2022    REVISION AV FISTULA LEFT ARM performed by Qamar Shukla MD at [address]    PTCA  2015    PTCA [facility] N/A 12/01/2021    EGD BIOPSY performed by Bakari Wilson MD at [address] 1/18/2023    EGD BIOPSY performed by Bakari Wilson MD at [address] [place] [address] N/A 11/24/2021    CATHETER INSERTION  TUNNELED HEMODIALYSIS, WITH REMOVAL OF TEMPORARY CATHETER performed by Qamar Shukla MD at [facility] history:   Tobacco:   reports that she quit smoking about 37 years ago. Her smoking use included cigarettes. She started smoking about 49 years ago. She has a 12.00 pack-year smoking history.  She has never used smokeless tobacco.  Alcohol: reports no history of alcohol use. Drugs:   reports no history of drug use. Family history:    Family History   Problem Relation Age of Onset    Coronary Art Dis Mother          age 62 acute MI    Coronary Art Dis Father          age 62 CVA    Coronary Art Dis Brother        Allergy and drug reactions: Allergies   Allergen Reactions    Pcn [Penicillins] Shortness Of Breath and Rash    Benzonatate Other (See Comments)     \"PATIENTS STATES \"IT WAS LIKE I WAS DRUNK. \"    Morphine Itching and Rash    Sodium Hypochlorite Nausea And Vomiting and Rash     AKA BLEACH. RASH FROM SKIN EXPOSURE        Scheduled Meds:   [START ON 2023] epoetin sadia-epbx  30 Units/kg SubCUTAneous Once per day on     sodium chloride flush  5-40 mL IntraVENous 2 times per day    pantoprazole  40 mg IntraVENous BID    calcium acetate  667 mg Oral TID WC    insulin glargine  24 Units SubCUTAneous Nightly    rosuvastatin  5 mg Oral Daily    insulin lispro  0-4 Units SubCUTAneous TID WC    insulin lispro  0-4 Units SubCUTAneous Nightly       PRN Meds:   sodium chloride flush, 5-40 mL, PRN  sodium chloride, , PRN  ondansetron, 4 mg, Q8H PRN   Or  ondansetron, 4 mg, Q6H PRN  polyethylene glycol, 17 g, Daily PRN  acetaminophen, 650 mg, Q6H PRN   Or  acetaminophen, 650 mg, Q6H PRN  midodrine, 10 mg, Daily PRN  glucose, 4 tablet, PRN  dextrose bolus, 125 mL, PRN   Or  dextrose bolus, 250 mL, PRN  glucagon (rDNA), 1 mg, PRN  dextrose, , Continuous PRN    Continuous Infusions:   sodium chloride      sodium chloride      dextrose         Review of Systems  All systems reviewed.  All negative except for symptoms mentioned in HPI     OBJECTIVE    /62   Pulse 71   Temp 99.3 °F (37.4 °C) (Oral)   Resp 18   Ht 5' 4\" (1.626 m)   Wt 260 lb 5.8 oz (118.1 kg)   SpO2 98%   BMI 44.69 kg/m²     Intake/Output Summary (Last 24 hours) at 2023  Last data filed at 2023 1140  Gross per 24 hour   Intake 300 ml   Output 2000 ml   Net -1700 ml       Physical examination:  General: awake alert, oriented x3  HEENT: normocephalic non traumatic, no exophthalmos   Neck: supple, No thyroid tenderness,  Pulm: good equal air entry no added sounds  CVS: S1 + S2  Abd: soft lax, no tenderness  Skin: warm, no lesions, no rash.  No open wounds, no ulcers   Neuro: CN intact, sensation decreased bilateral , muscle power normal  Psych: normal mood, and affect    Review of Laboratory Data:  I personally reviewed the following labs:   Recent Labs     01/30/23  1246 01/30/23  1950 01/31/23  0636 01/31/23  1730   WBC 10.0  --  8.9  --    RBC 2.87*  --  2.79*  --    HGB 8.9* 8.7* 8.6* 8.9*   HCT 28.3* 27.7* 27.6* 27.8*   MCV 98.6  --  98.9  --    MCH 31.0  --  30.8  --    MCHC 31.4*  --  31.2*  --    RDW 14.7  --  15.0  --      --  145  --    MPV 10.7  --  10.7  --      Recent Labs     01/30/23  1246 01/31/23  0636    137   K 4.3 3.9   CL 95* 97*   CO2 29 27   BUN 30* 33*   CREATININE 5.6* 6.5*   GLUCOSE 383* 167*   CALCIUM 8.5* 8.4*   PROT  --  6.3*   LABALBU  --  3.4*   BILITOT  --  0.4   ALKPHOS  --  78   AST  --  13   ALT  --  13     Beta-Hydroxybutyrate   Date Value Ref Range Status   01/17/2023 2.29 (H) 0.02 - 0.27 mmol/L Final   01/11/2022 0.46 (H) 0.02 - 0.27 mmol/L Final     Lab Results   Component Value Date/Time    LABA1C 11.0 01/31/2023 06:35 AM    LABA1C 11.9 01/17/2023 06:35 PM    LABA1C 12.7 12/03/2022 12:09 PM     Lab Results   Component Value Date/Time    TSH 1.720 01/31/2023 06:36 AM    T4FREE 1.27 12/03/2022 12:09 PM     Lab Results   Component Value Date/Time    LABA1C 11.0 01/31/2023 06:35 AM    GLUCOSE 167 01/31/2023 06:36 AM     Lab Results   Component Value Date/Time    TRIG 143 01/31/2023 06:36 AM    HDL 43 01/31/2023 06:36 AM    LDLCALC 100 01/31/2023 06:36 AM    CHOL 172 01/31/2023 06:36 AM       Blood culture   Lab Results   Component Value Date/Time    BC 5 Days no growth 12/03/2022 11:55 AM    BC 5 Days no growth 01/12/2022 05:10 AM       Radiology:  CT ABDOMEN PELVIS W IV CONTRAST Additional Contrast? None   Final Result   1. No acute abnormality is seen in the abdomen or the pelvis. 2. Minimal distal colonic diverticulosis without diverticulitis. 3. Prominent fat containing umbilical hernia. No evidence of fat   strangulation. 4. Cholelithiasis. 5. Small right pleural effusion. Medical Records/Labs/Images review:   I personally reviewed and summarized previous records   All labs and imaging were reviewed independently     [address] [facility], a 78 y.o.-old female seen today for inpatient diabetes management     Diabetes Mellitus type 2  Patient's diabetes is uncontrolled   For now, will change diabetes regimen to:  Lantus 20 u nightly   Humalog 4u with meals   medium dose sliding scale   Continue glucose check with meals and at bedtime   Will titrate insulin dose based on the blood glucose trend & insulin requirement  Daughter is to stop at our office tomorrow to  free insulin  Will arrange for patient to be seen in endocrinology clinic upon discharge for routine diabetes maintenance and prevention. Bright per rectal bleeding   Management per primary and surgery      ESRD  On hemodialysis  Patient is at risk for hypoglycemia while receiving insulin due to ESRD  Continue to monitor blood glucose closely    Interdisciplinary plan for communication with healthcare providers:   Consult recommendations were discussed with the Primary Service/Nursing staff      The above issues were reviewed with the patient who understood and agreed with the plan. Thank you for allowing us to participate in the care of this patient. Please do not hesitate to contact us with any additional questions.      Sanam Wong MD  Endocrinologist, [facility]   [address] [facility] [address]   Phone: [phone]  Fax: [phone]  --------------------------------------------  An electronic signature was used to authenticate this note.  Dc Joseph MD on 1/31/2023 at 7:39 PM

## 2023-02-02 NOTE — DISCHARGE SUMMARY
DISCHARGE SUMMARY  Patient ID:  Galdino Miguel  [id number]  54 y.o.  1955    Admit date: 1/30/2023      Discharge date : 2/1/2023      Admission Diagnoses: Rectal bleeding [K62.5]  Lower GI bleed [K92.2]  BRBPR (bright red blood per rectum) [K62.5]    Discharge Diagnosis  Principal Problem:    Rectal bleeding  Active Problems:    H/O heart artery stent    Diabetes mellitus (HCC)    End-stage renal disease on hemodialysis (HCC)    Lymphedema of both lower extremities    BRBPR (bright red blood per rectum)    Diabetes mellitus type 2 with complications ([facility]    Pulmonary hypertension, unspecified ([facility])    Obesity, Class III, BMI 40-49.9 (morbid obesity) ([facility]  Resolved Problems:    * No resolved hospital problems. *      Hospital Course:    CHIEF COMPLAINT: Bright red rectal bleeding per rectum     History of Present Illness: 61-year-old female patient of Dr. Moore How I am asked to admit and follow. History obtained from patient as well as extensive review of electronic record. (Patient currently sitting in bedside chair, daughter present through the exam.  No further bleeding. She advises me she has NOT BEEN USING Lantus nor Humalog; insurance would not cover it. ) Patient was seen at Dr. Henrique Elise office yesterday with the above complaints of bright red rectal bleeding. The above has been ongoing for 2 days. Patient was recently admitted to this hospital 1/17/2023 due to hematemesis. She had been on apixaban which was started early 12/2022 due to finding of pulmonary embolus. At the time of hematemesis the apixaban was discontinued. Patient is on chronic hemodialysis due to end-stage renal disease.   She underwent EGD by GI service on 1/19/2023 with the following findings--     DESCRIPTION OF PROCEDURE:  With the patient in the left lateral  decubitus position, the Olympus GIF-100 forward-viewing videoscope was  introduced into the esophagus, the evaluation of which showed grade B LA  classification GERD and no hiatal hernia was seen. The scope was then advanced through the gastroesophageal junction into  the gastric body, along the greater curvature. Evaluation of the body  of the stomach showed minimal gastritis. Biopsies were taken from this  area to rule out Helicobacter pylori infection. The scope was then advanced through the pylorus into the duodenal bulb  and second portion of the duodenum and evaluation showed severe  duodenitis with edema and superficial ulceration. Biopsies were done to  rule out sprue. The scope was then retrieved and retroflexed in the  prepyloric antrum, with thorough evaluation of the cardiac and fundal  portions of the stomach, which appeared to be within normal limits. The scope was then straightened, the area deflated, and the procedure  was terminated by withdrawing the scope and conducting a second look on  the way out, which was essentially the same (BRANDIE test done from that day was negative. Surgical biopsy showed chronic peptic duodenitis). Due to the recent pulmonary embolus it was felt best to have vascular consulted for possible IVC filter. Doppler ultrasound of lower extremities at that time was negative. She was taken to [facility] for the IV filter insertion on 1/20/2023. She was eventually discharged home to continue her hemodialysis. (Patient also has insulin-dependent diabetes had not been taking Lantus nor fast acting insulin prior to the admission for hematemesis. Patient was given prescriptions for the above however some difficulties encountered with insurance coverage. I discussed the above with Dr. Mary Lowe who would rectify the situation. )  --In the ED this admission initial blood pressure 129/52. SPO2 was 85 on room air. --In the ED random glucose was 383 BUN 30 creatinine 5.6. Admitting hemoglobin was 8.9 and it had been 8.4 on 1/21/2023.   --Today's labs BUN 33 creatinine 6.5 ionized calcium low at 1.07 total calcium low at 8.4. Phosphorus 5.2 protein 6.3 uric acid 7.3. Fasting glucose today 163 lactic acid 1.3 magnesium 1.9 potassium 3.9.  proBNP 30,524; it was 34,677 on 1/17/2023. A1c today 11.0 TSH 1.720 hemoglobin 8.6 WBC 8.9. INR 1.1 ESR 70.  --CT of the abdomen done in the ED with following results--          FINDINGS:   Lower Chest: The heart is normal in size. Prominent calcified coronary   atherosclerosis. Small right pleural effusion with overlying compressive   atelectasis. Organs:     Liver: Unremarkable. Gallbladder: Layering hyperdensity in the gallbladder likely represent   gallstones. No pericholecystic edema or biliary ductal dilatation. Pancreas: Moderately atrophied. No mass, ductal dilatation or edema is seen. Spleen:  Unremarkable. Adrenals: Unremarkable. Kidneys: Unremarkable. GI/Bowel: A few scattered distal colonic diverticuli are noted. No evidence   of acute diverticulitis. No bowel wall thickening or obstruction is seen. The appendix is unremarkable. Pelvis: The urinary bladder is unremarkable. Within limits of the CT   technique, the uterus and the adnexa are grossly unremarkable. Peritoneum/Retroperitoneum: No evidence of abdominal aortic aneurysm. Mild   calcified atherosclerosis in the pelvic arteries. IVC filter in situ. No   lymphadenopathy. No free air. No ascites. Bones/Soft Tissues: The visualized bones are intact without fracture or focal   lesion. Miscellaneous: 9.7 x 5.3 x 8.1 cm fat containing umbilical hernia. No   evidence of fat strangulation. Impression:       1. No acute abnormality is seen in the abdomen or the pelvis. 2. Minimal distal colonic diverticulosis without diverticulitis. 3. Prominent fat containing umbilical hernia. No evidence of fat   strangulation. 4. Cholelithiasis. 5. Small right pleural effusion.        Patient was seen by surgical service yesterday; last colonoscopy 2017 which was unremarkable. Transfuse hemoglobin less than 7. Protonix 40 mg IV twice daily. Okay for clear liquid diet from surgery. May need repeat colonoscopy, no emergent scope at this time. Surgical note from today no further rectal bleeding no bowel movement. No plans for procedure at this point. Okay for diet from surgical standpoint. Patient underwent dialysis this morning with net removed 1700 cc. Nephrology consult from the appreciated. Anemia due to ESRD. Secondary hyperparathyroidism due to ESRD. Continue hemodialysis Tuesdays Thursdays Saturdays. Calcium acetate 667 mg 3 times daily. 2/1/2023-SUBJECTIVE: Tara Hermosillo is alert awake and cooperative; oriented ×3. Denies any chest pain dyspnea nausea emesis. Tolerating diet. Sitting in bedside chair; she feels constipated took a dose of prune juice now having some abdominal cramping. She is hoping for discharge today. Afebrile last 24 hours. Blood pressure 142/57. SPO2 100 on 2 L nasal cannula. Intake and output -1700 cc. CO2 32 BUN 18 creatinine 4.4 fasting glucose 278 calcium 8.1 protein 6.2 albumin 3.4 liver functions normal.  Hemoglobin 8.6. It was 8.9 on admission. Physical therapy AMPAC score from yesterday 17/24. Endocrinology consult initially appreciated. Begin Lantus 20 units likely Humalog 4 units 3 times daily with meals. Medium dose sliding scale insulin. \"Daughter is to stop at our office tomorrow to  free insulin. Follow-up endocrinology clinic upon discharge. \"  Surgical note from today, no plans for procedures follow-up outpatient colonoscopy. Okay for diet, call for any other concerns. Physical therapy AMPAC score today 17/24. Hospital orders:     PLAN:  Medications discussed with patient  GI prophylaxis  DVT prophylaxis  Consultants notes reviewed  Surgery has been consulted  Endocrinology consult; she has not been using Lantus nor Humalog at home  Lantus 24 units nightly  Low-dose sliding scale insulin  Continue hemodialysis  Rosuvastatin 5 mg daily  Protonix 40 mg IV twice daily  Calcium acetate 667 mg, 3 times daily with meals  Retacrit 3560 units Monday Wednesday Friday  ProAmatine 10 mg daily as need for low blood pressure at dialysis  No surgical procedures planned at this point  Change diet to 4 carbohydrate no added salt diet      Instructions at discharge:      RE  CHANGES AND FINDINGS   Medications reviewed with patient  GI prophylaxis  DVT prophylaxis  Consultants notes reviewed    MED reconciliation completed  Prescriptions written  Follow-up Dr. Jamir Restrepo 7 to 10 days  Follow-up endocrinology per their instructions  Lantus 20 units nightly  Humalog 4 units with meals  Medium dose sliding scale insulin  Daughter is to stop and  free insulin at endocrine office      Condition at DISCHARGE : Jude Rasmussen8     Discharged to:  HOME    Discharge Instructions: Medications reviewed with patient    Consults:nephrology, general surgery, and endocrinology     Past Medical Hx :   Past Medical History:   Diagnosis Date    Acute pulmonary embolism ([address] 12/03/2022    Anemia in CKD (chronic kidney disease) 11/30/2021    Anxiety and depression 01/19/2022    At high risk for falls 01/19/2022    Brain aneurysm     CLABSI (central line-associated bloodstream infection) 11/19/2021    Cough 01/19/2022    Diabetes mellitus ([address] 2006    Diabetes mellitus type 2 with complications ([address] 26/18/5711    Dizziness on standing 01/19/2022    End-stage renal disease on hemodialysis ([address] 01/19/2023    ESRD (end stage renal disease) ([address] 11/19/2021    ESRD on hemodialysis ([address] 11/19/2021    Essential hypertension 11/30/2021    Fever, unspecified     Gastrointestinal hemorrhage 11/30/2021    H/O heart artery stent 2015    Done in [place]    History of Clostridioides difficile colitis 01/19/2022    Hyperlipidemia     Hypertension     Leg swelling 01/19/2023    Lymphedema of both lower extremities 01/19/2023    MSSA bacteremia 11/18/2021    Nausea & vomiting 11/18/2021    Obesity, Class III, BMI 40-49.9 (morbid obesity) ([facility] 17/59/8915    Periumbilical abdominal pain     S/P insertion of inferior vena caval filter 01/20/2023       Past Surgical Hx :  Past Surgical History:   Procedure Laterality Date    CARDIAC CATHETERIZATION  2015    Done in [address]      COLONOSCOPY  2017    Negative findings    DIALYSIS FISTULA CREATION Left 01/28/2022    REVISION AV FISTULA LEFT ARM performed by Paxton Gillette MD at [address] [place] [address]    PTCA  2015    PTCA [facility] N/A 12/01/2021    EGD BIOPSY performed by Love Nieto MD at [place] [address] 1/18/2023    EGD BIOPSY performed by Love Nieto MD at [address] [place] [address] N/A 11/24/2021    CATHETER INSERTION  TUNNELED HEMODIALYSIS, WITH REMOVAL OF TEMPORARY CATHETER performed by Paxton Gillette MD at [address] [place] [address] Rd:   CBC:   Lab Results   Component Value Date/Time    WBC 8.9 01/31/2023 06:36 AM    RBC 2.79 01/31/2023 06:36 AM    HGB 8.6 02/01/2023 04:00 AM    HCT 27.6 02/01/2023 04:00 AM    MCV 98.9 01/31/2023 06:36 AM    MCH 30.8 01/31/2023 06:36 AM    MCHC 31.2 01/31/2023 06:36 AM    RDW 15.0 01/31/2023 06:36 AM     01/31/2023 06:36 AM    MPV 10.7 01/31/2023 06:36 AM     CBC with Differential:    Lab Results   Component Value Date/Time    WBC 8.9 01/31/2023 06:36 AM    RBC 2.79 01/31/2023 06:36 AM    HGB 8.6 02/01/2023 04:00 AM    HCT 27.6 02/01/2023 04:00 AM     01/31/2023 06:36 AM    MCV 98.9 01/31/2023 06:36 AM    MCH 30.8 01/31/2023 06:36 AM    MCHC 31.2 01/31/2023 06:36 AM    RDW 15.0 01/31/2023 06:36 AM    NRBC 0.0 01/13/2022 04:38 AM    LYMPHOPCT 12.4 01/31/2023 06:36 AM    MONOPCT 5.9 01/31/2023 06:36 AM    BASOPCT 0.7 01/31/2023 06:36 AM    MONOSABS 0.52 01/31/2023 06:36 AM    LYMPHSABS 1.10 01/31/2023 06:36 AM    EOSABS 0.90 01/31/2023 06:36 AM    BASOSABS 0.06 01/31/2023 06:36 AM     Hemoglobin/Hematocrit:    Lab Results   Component Value Date/Time    HGB 8.6 02/01/2023 04:00 AM    HCT 27.6 02/01/2023 04:00 AM     CMP:    Lab Results   Component Value Date/Time     02/01/2023 04:00 AM    K 4.3 02/01/2023 04:00 AM    K 4.2 08/23/2022 12:12 PM    CL 95 02/01/2023 04:00 AM    CO2 32 02/01/2023 04:00 AM    BUN 18 02/01/2023 04:00 AM    CREATININE 4.4 02/01/2023 04:00 AM    GFRAA 8 09/22/2022 12:14 PM    LABGLOM 10 02/01/2023 04:00 AM    GLUCOSE 278 02/01/2023 04:00 AM    PROT 6.2 02/01/2023 04:00 AM    LABALBU 3.4 02/01/2023 04:00 AM    CALCIUM 8.1 02/01/2023 04:00 AM    BILITOT 0.4 02/01/2023 04:00 AM    ALKPHOS 88 02/01/2023 04:00 AM    AST 13 02/01/2023 04:00 AM    ALT 12 02/01/2023 04:00 AM     BMP:    Lab Results   Component Value Date/Time     02/01/2023 04:00 AM    K 4.3 02/01/2023 04:00 AM    K 4.2 08/23/2022 12:12 PM    CL 95 02/01/2023 04:00 AM    CO2 32 02/01/2023 04:00 AM    BUN 18 02/01/2023 04:00 AM    LABALBU 3.4 02/01/2023 04:00 AM    CREATININE 4.4 02/01/2023 04:00 AM    CALCIUM 8.1 02/01/2023 04:00 AM    GFRAA 8 09/22/2022 12:14 PM    LABGLOM 10 02/01/2023 04:00 AM    GLUCOSE 278 02/01/2023 04:00 AM     Hepatic Function Panel:    Lab Results   Component Value Date/Time    ALKPHOS 88 02/01/2023 04:00 AM    ALT 12 02/01/2023 04:00 AM    AST 13 02/01/2023 04:00 AM    PROT 6.2 02/01/2023 04:00 AM    BILITOT 0.4 02/01/2023 04:00 AM    BILIDIR <0.2 02/01/2023 04:00 AM    IBILI see below 02/01/2023 04:00 AM    LABALBU 3.4 02/01/2023 04:00 AM     Ionized Calcium:  No results found for: IONCA  Magnesium:    Lab Results   Component Value Date/Time    MG 2.1 02/01/2023 04:00 AM     Phosphorus:    Lab Results   Component Value Date/Time    PHOS 3.8 02/01/2023 04:00 AM     LDH:    Lab Results   Component Value Date/Time     01/11/2022 12:45 PM     Uric Acid:    Lab Results Component Value Date/Time    LABURIC 7.3 01/31/2023 06:36 AM     PT/INR:    Lab Results   Component Value Date/Time    PROTIME 12.0 01/31/2023 06:36 AM    INR 1.1 01/31/2023 06:36 AM     Warfarin PT/INR:  No components found for: PTPATWAR, PTINRWAR  PTT:    Lab Results   Component Value Date/Time    APTT 34.5 01/17/2023 08:35 AM   [APTT}  Troponin:  No results found for: TROPONINI  Last 3 Troponin:  No results found for: TROPONINI  U/A:    Lab Results   Component Value Date/Time    COLORU Yellow 11/18/2021 03:19 AM    PROTEINU >=300 11/18/2021 03:19 AM    PHUR 6.0 11/18/2021 03:19 AM    WBCUA 1-3 11/18/2021 03:19 AM    RBCUA 0-1 11/18/2021 03:19 AM    BACTERIA FEW 11/18/2021 03:19 AM    CLARITYU Clear 11/18/2021 03:19 AM    SPECGRAV 1.020 11/18/2021 03:19 AM    LEUKOCYTESUR Negative 11/18/2021 03:19 AM    UROBILINOGEN 0.2 11/18/2021 03:19 AM    BILIRUBINUR Negative 11/18/2021 03:19 AM    BLOODU SMALL 11/18/2021 03:19 AM    GLUCOSEU 500 11/18/2021 03:19 AM     ABG:  No results found for: PH, PCO2, PO2, HCO3, BE, THGB, TCO2, O2SAT  HgBA1c:    Lab Results   Component Value Date/Time    LABA1C 11.0 01/31/2023 06:35 AM     FLP:    Lab Results   Component Value Date/Time    TRIG 143 01/31/2023 06:36 AM    HDL 43 01/31/2023 06:36 AM    LDLCALC 100 01/31/2023 06:36 AM    LABVLDL 29 01/31/2023 06:36 AM     TSH:    Lab Results   Component Value Date/Time    TSH 1.720 01/31/2023 06:36 AM     VITAMIN B12: No components found for: B12  FOLATE:    Lab Results   Component Value Date/Time    FOLATE 9.3 01/31/2023 06:35 AM     IRON:    Lab Results   Component Value Date/Time    IRON 45 01/31/2023 06:35 AM     Iron Saturation:  No components found for: PERCENTFE  TIBC:    Lab Results   Component Value Date/Time    TIBC 173 01/31/2023 06:35 AM     FERRITIN:    Lab Results   Component Value Date/Time    FERRITIN 1,357 01/31/2023 06:35 AM     LIPASE:    Lab Results   Component Value Date/Time    LIPASE 43 01/17/2023 08:35 AM CBC:  Recent Labs     01/30/23  1246 01/30/23  1950 01/31/23 0636 01/31/23  1730 02/01/23  0400   WBC 10.0  --  8.9  --   --    RBC 2.87*  --  2.79*  --   --    HGB 8.9*   < > 8.6* 8.9* 8.6*   HCT 28.3*   < > 27.6* 27.8* 27.6*     --  145  --   --    MCV 98.6  --  98.9  --   --    MCH 31.0  --  30.8  --   --    MCHC 31.4*  --  31.2*  --   --    RDW 14.7  --  15.0  --   --    LYMPHOPCT  --   --  12.4*  --   --    MONOPCT  --   --  5.9  --   --    BASOPCT  --   --  0.7  --   --    MONOSABS  --   --  0.52  --   --    LYMPHSABS  --   --  1.10*  --   --    EOSABS  --   --  0.90*  --   --    BASOSABS  --   --  0.06  --   --     < > = values in this interval not displayed. H & H :  Recent Labs     01/30/23  1246 01/30/23  1950 01/31/23 0636 01/31/23 1730 02/01/23  0400   HGB 8.9* 8.7* 8.6* 8.9* 8.6*       TSH:  Recent Labs     01/31/23 0636   TSH 1.720       GLUCOSE:No results for input(s): POCGLU in the last 72 hours.     CMP:  Recent Labs     01/30/23  1246 01/31/23  0636 02/01/23  0400    137 137   K 4.3 3.9 4.3   CL 95* 97* 95*   CO2 29 27 32*   BUN 30* 33* 18   CREATININE 5.6* 6.5* 4.4*   LABGLOM 8 7 10   GLUCOSE 383* 167* 278*   PROT  --  6.3* 6.2*   LABALBU  --  3.4* 3.4*   CALCIUM 8.5* 8.4* 8.1*   BILITOT  --  0.4 0.4   ALKPHOS  --  78 88   AST  --  13 13   ALT  --  13 12         BNP:No results found for: BNP    PROTIME/INR:  Recent Labs     01/31/23  0636   PROTIME 12.0   INR 1.1       CRP:   Recent Labs     01/31/23  0635 02/01/23  0400   CRP 5.3* 8.8*       ESR:  Recent Labs     01/31/23  0636 02/01/23  0400   SEDRATE 70* 65*       LIPASE , AMYLASE:  Lab Results   Component Value Date    LIPASE 43 01/17/2023      No results found for: AMYLASE    ABGs: No results found for: Jackalyn Wynne, [id number]    CARDIAC:   Recent Labs     01/31/23  0636   CKTOTAL 32       Lipid Profile:   Lab Results   Component Value Date/Time    TRIG 143 01/31/2023 06:36 AM    HDL 43 01/31/2023 06:36 AM [address] 100 01/31/2023 06:36 AM    CHOL 172 01/31/2023 06:36 AM        CT ABDOMEN PELVIS W IV CONTRAST Additional Contrast? None    Result Date: 1/30/2023  EXAMINATION: CT OF THE ABDOMEN AND PELVIS WITH CONTRAST 1/30/2023 2:34 pm TECHNIQUE: CT of the abdomen and pelvis was performed with the administration of intravenous contrast. Multiplanar reformatted images are provided for review. Automated exposure control, iterative reconstruction, and/or weight based adjustment of the mA/kV was utilized to reduce the radiation dose to as low as reasonably achievable. COMPARISON: CT abdomen and pelvis, 12/03/2022 HISTORY: ORDERING SYSTEM PROVIDED HISTORY: rectal bleeding TECHNOLOGIST PROVIDED HISTORY: Reason for exam:->rectal bleeding Additional Contrast?->None Decision Support Exception - unselect if not a suspected or confirmed emergency medical condition->Emergency Medical Condition (MA) FINDINGS: Lower Chest: The heart is normal in size. Prominent calcified coronary atherosclerosis. Small right pleural effusion with overlying compressive atelectasis. Organs: Liver: Unremarkable. Gallbladder: Layering hyperdensity in the gallbladder likely represent gallstones. No pericholecystic edema or biliary ductal dilatation. Pancreas: Moderately atrophied. No mass, ductal dilatation or edema is seen. Spleen:  Unremarkable. Adrenals: Unremarkable. Kidneys: Unremarkable. GI/Bowel: A few scattered distal colonic diverticuli are noted. No evidence of acute diverticulitis. No bowel wall thickening or obstruction is seen. The appendix is unremarkable. Pelvis: The urinary bladder is unremarkable. Within limits of the CT technique, the uterus and the adnexa are grossly unremarkable. Peritoneum/Retroperitoneum: No evidence of abdominal aortic aneurysm. Mild calcified atherosclerosis in the pelvic arteries. IVC filter in situ. No lymphadenopathy. No free air. No ascites. Bones/Soft Tissues:  The visualized bones are intact without fracture or focal lesion. Miscellaneous: 9.7 x 5.3 x 8.1 cm fat containing umbilical hernia. No evidence of fat strangulation. 1.  No acute abnormality is seen in the abdomen or the pelvis. 2. Minimal distal colonic diverticulosis without diverticulitis. 3. Prominent fat containing umbilical hernia. No evidence of fat strangulation. 4. Cholelithiasis. 5. Small right pleural effusion. US DUP LOWER EXTREMITIES BILATERAL VENOUS    Result Date: 1/19/2023  EXAMINATION: DUPLEX VENOUS ULTRASOUND OF THE BILATERAL LOWER EXTREMITIES1/19/2023 9:18 pm TECHNIQUE: Duplex ultrasound using B-mode/gray scaled imaging, Doppler spectral analysis and color flow Doppler was obtained of the deep venous structures of the lower bilateral extremities. COMPARISON: None. HISTORY: ORDERING SYSTEM PROVIDED HISTORY: Leg swelling, rule out DVT TECHNOLOGIST PROVIDED HISTORY: leg swelling, rule out DVT Reason for exam:->Leg swelling, rule out DVT What reading provider will be dictating this exam?->CRC FINDINGS: The visualized veins of the bilateral lower extremities are patent and free of echogenic thrombus. The veins demonstrate good compressibility with normal color flow study and spectral analysis. No evidence of DVT in either lower extremity. Discharge Exam:  See progress note from today    Discharge Medication List as of 2/1/2023  2:23 PM        START taking these medications    Details   !! insulin lispro (HUMALOG) 100 UNIT/ML SOLN injection vial Inject 0-8 Units into the skin 3 times daily (with meals), Disp-1 each, R-2Normal      !! insulin lispro (HUMALOG) 100 UNIT/ML SOLN injection vial Inject 0-4 Units into the skin nightly, Disp-1 each, R-3Normal      !! insulin lispro (HUMALOG) 100 UNIT/ML SOLN injection vial Inject 4 Units into the skin 3 times daily (with meals), Disp-1 each, R-3Normal       !! - Potential duplicate medications found. Please discuss with provider.         CONTINUE these medications which have CHANGED    Details   insulin glargine (LANTUS) 100 UNIT/ML injection vial Inject 20 Units into the skin nightly, Disp-10 mL, R-3Normal           CONTINUE these medications which have NOT CHANGED    Details   rosuvastatin (CRESTOR) 5 MG tablet Take 1 tablet by mouth daily, Disp-30 tablet, R-3Normal      !! Blood Glucose Monitoring Suppl (BLOOD GLUCOSE MONITOR SYSTEM) w/Device KIT Historical Med      glucose 4 g chewable tablet Take 4 tablets by mouth as needed for Low blood sugar, Disp-60 tablet, R-3Normal      pantoprazole (PROTONIX) 40 MG tablet Take 1 tablet by mouth 2 times daily (before meals), Disp-60 tablet, R-4Normal      !! glucose monitoring (FREESTYLE FREEDOM) kit DAILY Starting Sat 1/21/2023, Disp-1 kit, R-0, Normal      Lancets 30G MISC DAILY Starting Sat 1/21/2023, Disp-100 each, R-3, Normal      blood glucose monitor strips Test 3 times a day. Dispense sufficient amount for indicated testing frequency plus additional to accommodate PRN testing needs. , Disp-300 strip, R-0, Normal      acetaminophen (TYLENOL) 650 MG extended release tablet Take 1,300 mg by mouth every 8 hours as needed for PainHistorical Med      midodrine (PROAMATINE) 10 MG tablet Take 10 mg by mouth daily as needed (AT DIALYSIS FOR LOW BP)Historical Med      calcium acetate (PHOSLO) 667 MG CAPS capsule Take 667 mg by mouth 3 times daily (with meals)Historical Med       !! - Potential duplicate medications found. Please discuss with provider. STOP taking these medications       insulin lispro, 1 Unit Dial, (HUMALOG KWIKPEN) 100 UNIT/ML SOPN Comments:   Reason for Stopping:               Time Spent on discharge is more than 30 minutes --      TIME  INCLUDES TIME THAT WAS  SPENT WITH DISCHARGE PAPERS, MEDICATION REVIEW, MEDICATION RECONCILIATION,   PRESCRIPTIONS, CHART REVIEW, PATIENT EXAM , FINAL PROGRESS NOTE, DISCUSSION OF FINDINGS WITH PATIENT AND AVAILABLE FAMILY , AND DICTATION  WHERE NEEDED ;  Home Health Care [facility]) FORMS COMPLETED ; N-17  COMPLETION ;  H&P UPDATED ; DURABLE EQUIPMENT FORMS.      Active Hospital Problems    Diagnosis     Rectal bleeding [K62.5]      Priority: Medium    BRBPR (bright red blood per rectum) [K62.5]      Priority: Medium    End-stage renal disease on hemodialysis (HCC) [N18.6, Z99.2]      Priority: Medium    Lymphedema of both lower extremities [I89.0]      Priority: Medium    H/O heart artery stent [Z95.5]      Priority: Medium    Diabetes mellitus ([address] [E11.9]      Priority: Medium    Pulmonary hypertension, unspecified ([address] [I27.20]     Obesity, Class III, BMI 40-49.9 (morbid obesity) ([address] [E66.01]     Diabetes mellitus type 2 with complications ([address] [Q10.5]        Signed:    Jc Garcia DO      2/2/2023    11:35 AM    Voice recognition software use for dictation

## 2023-02-03 ENCOUNTER — TELEPHONE (OUTPATIENT)
Dept: PRIMARY CARE CLINIC | Age: 68
End: 2023-02-03

## 2023-02-03 NOTE — TELEPHONE ENCOUNTER
Care Transitions Initial Follow Up Call    Outreach made within 2 business days of discharge: Yes    Patient: Katty Currie Patient : 1955   MRN: [id number]  Reason for Admission: There are no discharge diagnoses documented for the most recent discharge. Discharge Date: 23       Spoke with: Patient    Discharge department/facility: [facility] Interactive Patient Contact:  Was patient able to fill all prescriptions: Yes  Was patient instructed to bring all medications to the follow-up visit: Yes  Is patient taking all medications as directed in the discharge summary?  Yes  Does patient understand their discharge instructions: Yes  Does patient have questions or concerns that need addressed prior to 7-14 day follow up office visit: yes - Yes    Scheduled appointment with PCP within 7-14 days    Follow Up  Future Appointments   Date Time Provider Surinder Pastrana   2023  3:00  [address], APRN - NP [facility]   4/3/2023  9:00 AM Omar Mckeon MD [facility], [place]

## 2023-02-07 ENCOUNTER — APPOINTMENT (OUTPATIENT)
Dept: GENERAL RADIOLOGY | Age: 68
DRG: 291 | End: 2023-02-07
Payer: MEDICARE

## 2023-02-07 ENCOUNTER — APPOINTMENT (OUTPATIENT)
Dept: ULTRASOUND IMAGING | Age: 68
DRG: 291 | End: 2023-02-07
Payer: MEDICARE

## 2023-02-07 ENCOUNTER — HOSPITAL ENCOUNTER (INPATIENT)
Age: 68
LOS: 5 days | Discharge: HOME HEALTH | DRG: 291 | End: 2023-02-12
Attending: EMERGENCY MEDICINE | Admitting: INTERNAL MEDICINE
Payer: MEDICARE

## 2023-02-07 ENCOUNTER — APPOINTMENT (OUTPATIENT)
Dept: CT IMAGING | Age: 68
DRG: 291 | End: 2023-02-07
Payer: MEDICARE

## 2023-02-07 DIAGNOSIS — J96.01 ACUTE RESPIRATORY FAILURE WITH HYPOXIA (HCC): Primary | ICD-10-CM

## 2023-02-07 DIAGNOSIS — I26.93 SINGLE SUBSEGMENTAL PULMONARY EMBOLISM WITHOUT ACUTE COR PULMONALE (HCC): ICD-10-CM

## 2023-02-07 DIAGNOSIS — N18.6 ESRD (END STAGE RENAL DISEASE) ON DIALYSIS (HCC): ICD-10-CM

## 2023-02-07 DIAGNOSIS — R06.02 SHORTNESS OF BREATH: ICD-10-CM

## 2023-02-07 DIAGNOSIS — Z99.2 ESRD (END STAGE RENAL DISEASE) ON DIALYSIS (HCC): ICD-10-CM

## 2023-02-07 PROBLEM — I50.33 ACUTE ON CHRONIC DIASTOLIC CHF (CONGESTIVE HEART FAILURE) (HCC): Status: ACTIVE | Noted: 2023-02-07

## 2023-02-07 LAB
ANION GAP SERPL CALCULATED.3IONS-SCNC: 18 MMOL/L (ref 7–16)
BASOPHILS ABSOLUTE: 0.05 E9/L (ref 0–0.2)
BASOPHILS RELATIVE PERCENT: 0.5 % (ref 0–2)
BUN BLDV-MCNC: 15 MG/DL (ref 6–23)
CALCIUM SERPL-MCNC: 8.3 MG/DL (ref 8.6–10.2)
CHLORIDE BLD-SCNC: 94 MMOL/L (ref 98–107)
CO2: 25 MMOL/L (ref 22–29)
CREAT SERPL-MCNC: 3.5 MG/DL (ref 0.5–1)
EKG ATRIAL RATE: 80 BPM
EKG P AXIS: 77 DEGREES
EKG P-R INTERVAL: 162 MS
EKG Q-T INTERVAL: 412 MS
EKG QRS DURATION: 82 MS
EKG QTC CALCULATION (BAZETT): 475 MS
EKG R AXIS: -14 DEGREES
EKG T AXIS: 42 DEGREES
EKG VENTRICULAR RATE: 80 BPM
EOSINOPHILS ABSOLUTE: 0.31 E9/L (ref 0.05–0.5)
EOSINOPHILS RELATIVE PERCENT: 3.1 % (ref 0–6)
GFR SERPL CREATININE-BSD FRML MDRD: 14 ML/MIN/1.73
GLUCOSE BLD-MCNC: 181 MG/DL (ref 74–99)
HCT VFR BLD CALC: 28.6 % (ref 34–48)
HEMOGLOBIN: 9.2 G/DL (ref 11.5–15.5)
IMMATURE GRANULOCYTES #: 0.05 E9/L
IMMATURE GRANULOCYTES %: 0.5 % (ref 0–5)
INFLUENZA A BY PCR: NOT DETECTED
INFLUENZA B BY PCR: NOT DETECTED
LYMPHOCYTES ABSOLUTE: 0.89 E9/L (ref 1.5–4)
LYMPHOCYTES RELATIVE PERCENT: 9 % (ref 20–42)
MAGNESIUM: 1.9 MG/DL (ref 1.6–2.6)
MAGNESIUM: 1.9 MG/DL (ref 1.6–2.6)
MCH RBC QN AUTO: 31.1 PG (ref 26–35)
MCHC RBC AUTO-ENTMCNC: 32.2 % (ref 32–34.5)
MCV RBC AUTO: 96.6 FL (ref 80–99.9)
METER GLUCOSE: 244 MG/DL (ref 74–99)
METER GLUCOSE: 300 MG/DL (ref 74–99)
MONOCYTES ABSOLUTE: 0.65 E9/L (ref 0.1–0.95)
MONOCYTES RELATIVE PERCENT: 6.6 % (ref 2–12)
NEUTROPHILS ABSOLUTE: 7.95 E9/L (ref 1.8–7.3)
NEUTROPHILS RELATIVE PERCENT: 80.3 % (ref 43–80)
PDW BLD-RTO: 15 FL (ref 11.5–15)
PLATELET # BLD: 173 E9/L (ref 130–450)
PMV BLD AUTO: 11 FL (ref 7–12)
POTASSIUM SERPL-SCNC: 4.5 MMOL/L (ref 3.5–5)
PRO-BNP: ABNORMAL PG/ML (ref 0–125)
RBC # BLD: 2.96 E12/L (ref 3.5–5.5)
REASON FOR REJECTION: NORMAL
REJECTED TEST: NORMAL
SARS-COV-2, NAAT: NOT DETECTED
SODIUM BLD-SCNC: 137 MMOL/L (ref 132–146)
TROPONIN, HIGH SENSITIVITY: 91 NG/L (ref 0–9)
WBC # BLD: 9.9 E9/L (ref 4.5–11.5)

## 2023-02-07 PROCEDURE — 71045 X-RAY EXAM CHEST 1 VIEW: CPT

## 2023-02-07 PROCEDURE — 80048 BASIC METABOLIC PNL TOTAL CA: CPT

## 2023-02-07 PROCEDURE — 99285 EMERGENCY DEPT VISIT HI MDM: CPT

## 2023-02-07 PROCEDURE — 6360000004 HC RX CONTRAST MEDICATION: Performed by: RADIOLOGY

## 2023-02-07 PROCEDURE — 2700000000 HC OXYGEN THERAPY PER DAY

## 2023-02-07 PROCEDURE — 93005 ELECTROCARDIOGRAM TRACING: CPT

## 2023-02-07 PROCEDURE — 83735 ASSAY OF MAGNESIUM: CPT

## 2023-02-07 PROCEDURE — 2500000003 HC RX 250 WO HCPCS: Performed by: INTERNAL MEDICINE

## 2023-02-07 PROCEDURE — 2060000000 HC ICU INTERMEDIATE R&B

## 2023-02-07 PROCEDURE — 71275 CT ANGIOGRAPHY CHEST: CPT

## 2023-02-07 PROCEDURE — 93970 EXTREMITY STUDY: CPT

## 2023-02-07 PROCEDURE — 84484 ASSAY OF TROPONIN QUANT: CPT

## 2023-02-07 PROCEDURE — 0202U NFCT DS 22 TRGT SARS-COV-2: CPT

## 2023-02-07 PROCEDURE — 87502 INFLUENZA DNA AMP PROBE: CPT

## 2023-02-07 PROCEDURE — 6370000000 HC RX 637 (ALT 250 FOR IP): Performed by: INTERNAL MEDICINE

## 2023-02-07 PROCEDURE — 85025 COMPLETE CBC W/AUTO DIFF WBC: CPT

## 2023-02-07 PROCEDURE — 83880 ASSAY OF NATRIURETIC PEPTIDE: CPT

## 2023-02-07 PROCEDURE — 93010 ELECTROCARDIOGRAM REPORT: CPT | Performed by: INTERNAL MEDICINE

## 2023-02-07 PROCEDURE — 87635 SARS-COV-2 COVID-19 AMP PRB: CPT

## 2023-02-07 PROCEDURE — 82962 GLUCOSE BLOOD TEST: CPT

## 2023-02-07 RX ORDER — PANTOPRAZOLE SODIUM 40 MG/1
40 TABLET, DELAYED RELEASE ORAL
Status: DISCONTINUED | OUTPATIENT
Start: 2023-02-08 | End: 2023-02-11

## 2023-02-07 RX ORDER — MIDODRINE HYDROCHLORIDE 5 MG/1
10 TABLET ORAL DAILY PRN
Status: DISCONTINUED | OUTPATIENT
Start: 2023-02-07 | End: 2023-02-12 | Stop reason: HOSPADM

## 2023-02-07 RX ORDER — ROSUVASTATIN CALCIUM 5 MG/1
5 TABLET, COATED ORAL DAILY
Status: DISCONTINUED | OUTPATIENT
Start: 2023-02-07 | End: 2023-02-09

## 2023-02-07 RX ORDER — INSULIN GLARGINE 100 [IU]/ML
10 INJECTION, SOLUTION SUBCUTANEOUS ONCE
Status: COMPLETED | OUTPATIENT
Start: 2023-02-07 | End: 2023-02-07

## 2023-02-07 RX ORDER — CALCIUM ACETATE 667 MG/1
667 CAPSULE ORAL
Status: DISCONTINUED | OUTPATIENT
Start: 2023-02-07 | End: 2023-02-12 | Stop reason: HOSPADM

## 2023-02-07 RX ORDER — PANTOPRAZOLE SODIUM 40 MG/1
40 TABLET, DELAYED RELEASE ORAL
Status: DISCONTINUED | OUTPATIENT
Start: 2023-02-08 | End: 2023-02-08

## 2023-02-07 RX ORDER — ACETAMINOPHEN 325 MG/1
1300 TABLET ORAL EVERY 8 HOURS PRN
Status: DISCONTINUED | OUTPATIENT
Start: 2023-02-07 | End: 2023-02-10 | Stop reason: SDUPTHER

## 2023-02-07 RX ORDER — INSULIN LISPRO 100 [IU]/ML
4 INJECTION, SOLUTION INTRAVENOUS; SUBCUTANEOUS
Status: DISCONTINUED | OUTPATIENT
Start: 2023-02-07 | End: 2023-02-12 | Stop reason: HOSPADM

## 2023-02-07 RX ORDER — INSULIN LISPRO 100 [IU]/ML
0-4 INJECTION, SOLUTION INTRAVENOUS; SUBCUTANEOUS NIGHTLY
Status: DISCONTINUED | OUTPATIENT
Start: 2023-02-07 | End: 2023-02-12 | Stop reason: HOSPADM

## 2023-02-07 RX ORDER — ACETAMINOPHEN 325 MG/1
650 TABLET ORAL EVERY 4 HOURS PRN
Status: DISCONTINUED | OUTPATIENT
Start: 2023-02-07 | End: 2023-02-12 | Stop reason: HOSPADM

## 2023-02-07 RX ORDER — DEXTROSE MONOHYDRATE 100 MG/ML
INJECTION, SOLUTION INTRAVENOUS CONTINUOUS PRN
Status: DISCONTINUED | OUTPATIENT
Start: 2023-02-07 | End: 2023-02-12 | Stop reason: HOSPADM

## 2023-02-07 RX ORDER — INSULIN GLARGINE 100 [IU]/ML
20 INJECTION, SOLUTION SUBCUTANEOUS NIGHTLY
Status: DISCONTINUED | OUTPATIENT
Start: 2023-02-07 | End: 2023-02-12 | Stop reason: HOSPADM

## 2023-02-07 RX ORDER — INSULIN LISPRO 100 [IU]/ML
0-4 INJECTION, SOLUTION INTRAVENOUS; SUBCUTANEOUS
Status: DISCONTINUED | OUTPATIENT
Start: 2023-02-07 | End: 2023-02-12 | Stop reason: HOSPADM

## 2023-02-07 RX ORDER — HEPARIN SODIUM 10000 [USP'U]/ML
5000 INJECTION, SOLUTION INTRAVENOUS; SUBCUTANEOUS EVERY 8 HOURS
Status: DISCONTINUED | OUTPATIENT
Start: 2023-02-07 | End: 2023-02-12 | Stop reason: HOSPADM

## 2023-02-07 RX ADMIN — INSULIN LISPRO 3 UNITS: 100 INJECTION, SOLUTION INTRAVENOUS; SUBCUTANEOUS at 18:32

## 2023-02-07 RX ADMIN — INSULIN GLARGINE 10 UNITS: 100 INJECTION, SOLUTION SUBCUTANEOUS at 21:35

## 2023-02-07 RX ADMIN — INSULIN LISPRO 4 UNITS: 100 INJECTION, SOLUTION INTRAVENOUS; SUBCUTANEOUS at 18:30

## 2023-02-07 RX ADMIN — CALCIUM ACETATE 667 MG: 667 CAPSULE ORAL at 18:29

## 2023-02-07 RX ADMIN — ROSUVASTATIN CALCIUM 5 MG: 5 TABLET, FILM COATED ORAL at 18:29

## 2023-02-07 RX ADMIN — IOPAMIDOL 75 ML: 755 INJECTION, SOLUTION INTRAVENOUS at 14:27

## 2023-02-07 ASSESSMENT — ENCOUNTER SYMPTOMS
ABDOMINAL PAIN: 0
EYE PAIN: 0
PHOTOPHOBIA: 0
COUGH: 0
DIARRHEA: 0
BACK PAIN: 0
SORE THROAT: 0
FACIAL SWELLING: 0
NAUSEA: 0
VOMITING: 0
CHOKING: 0
CONSTIPATION: 0
SHORTNESS OF BREATH: 1

## 2023-02-07 ASSESSMENT — PAIN SCALES - GENERAL
PAINLEVEL_OUTOF10: 6
PAINLEVEL_OUTOF10: 0

## 2023-02-07 ASSESSMENT — PAIN - FUNCTIONAL ASSESSMENT: PAIN_FUNCTIONAL_ASSESSMENT: 0-10

## 2023-02-07 ASSESSMENT — PAIN DESCRIPTION - LOCATION: LOCATION: BACK

## 2023-02-07 NOTE — PROGRESS NOTES
Dr. Cammy Apgar answering service notified of new consult. Dr. Mitzi Bates answering service notified of new consult.

## 2023-02-07 NOTE — ED NOTES
Radiology Procedure Waiver   Name: Melodie Irwin  : 1955  MRN: [id number]    Date:  23    Time: 1:39 PM EST    Benefits of immediately proceeding with Radiology exam(s) without pre-testing outweigh the risks or are not indicated as specified below and therefore the following is/are being waived:    [] Pregnancy test   [] Patients LMP on-time and regular.   [] Patient had Tubal Ligation or has other Contraception Device. [] Patient  is Menopausal or Premenarcheal.    [] Patient had Full or Partial Hysterectomy. [] Protocol for Iodine allergy    [] MRI Questionnaire     [] BUN/Creatinine   [] Patient age w/no hx of renal dysfunction. [x] Patient on Dialysis. [] Recent Normal Labs.   Electronically signed by Bill Elliott MD on 23 at 1:39 PM EST               Bill Elliott MD  Resident  [phone]

## 2023-02-07 NOTE — ED PROVIDER NOTES
[facility]  Department of Emergency Medicine     Written by: Antonio Oneil MD  Patient Name: Myrtle Cockayne  Attending Provider: Gris Corona DO  Admit Date: 2023 10:40 AM  MRN: [id number]                   : 1955        Chief Complaint   Patient presents with    Shortness of Breath     Pain rt posterior rib cage, pt diagnosed with pulm embolus in Dec. O2 dependent at home. SaO2 80's in dialysis     - Chief complaint    This is 52-year-old female with history of diabetes, hyperlipidemia, hypertension, brain aneurysm, CKD on dialysis on Saturday, Tuesday, Thursday, pulmonary embolism diagnosed 3 months ago the presents the ED with complaints of shortness of breath and chest pressure and \"not feeling well\". She states that she was diagnosed with a pulmonary embolism 3 months ago in November and was admitted and discharged on Eliquis, however was later diagnosed peptic ulcer disease in December was taken off Eliquis. She says that she has endoscopy scheduled tomorrow to evaluate peptic ulcer and evaluated the patient can be back on Eliquis patient does not since last night, she developed left-sided chest pressure and gradually worsening shortness of breath. The patient states that she is normally 2 L of oxygen at home, however today she went to dialysis, and on 2 L she was noted to be around 80%. The nurse practitioner prompted her to come to the ED to be evaluated. She says she has no history of thrombi in her legs, however her left leg has become more swollen than usual.         Review of Systems   Constitutional:  Negative for appetite change, chills, diaphoresis and fever. Malaise   HENT:  Negative for ear discharge, ear pain, facial swelling, sneezing and sore throat. Eyes:  Negative for photophobia and pain. Respiratory:  Positive for shortness of breath. Negative for cough and choking. Cardiovascular:  Positive for chest pain and leg swelling.  Negative for palpitations. Gastrointestinal:  Negative for abdominal pain, constipation, diarrhea, nausea and vomiting. Genitourinary:  Negative for dysuria, enuresis, flank pain, frequency and hematuria. Musculoskeletal:  Negative for arthralgias, back pain, joint swelling, myalgias and neck pain. Skin:  Negative for pallor and rash. Neurological:  Negative for weakness, light-headedness and headaches. Physical Exam  Constitutional:       General: She is not in acute distress. Appearance: Normal appearance. She is not ill-appearing. HENT:      Head: Normocephalic and atraumatic. Nose: Nose normal. No congestion. Mouth/Throat:      Mouth: Mucous membranes are moist.      Pharynx: No posterior oropharyngeal erythema. Eyes:      General: No scleral icterus. Extraocular Movements: Extraocular movements intact. Pupils: Pupils are equal, round, and reactive to light. Cardiovascular:      Rate and Rhythm: Normal rate and regular rhythm. Pulses: Normal pulses. Heart sounds: Normal heart sounds. Pulmonary:      Effort: Pulmonary effort is normal. No respiratory distress. Breath sounds: No stridor. Examination of the right-lower field reveals decreased breath sounds. Decreased breath sounds present. No wheezing or rhonchi. Chest:      Chest wall: No tenderness. Abdominal:      General: Bowel sounds are normal. There is no distension. Palpations: Abdomen is soft. There is no mass. Tenderness: There is no abdominal tenderness. Musculoskeletal:         General: No swelling, tenderness or deformity. Normal range of motion. Cervical back: Normal range of motion. No rigidity or tenderness. Right lower leg: Edema present. Left lower leg: Edema present. Skin:     Capillary Refill: Capillary refill takes less than 2 seconds. Coloration: Skin is not jaundiced or pale. Findings: Rash present. No erythema.    Neurological:      General: No focal deficit present. Mental Status: She is alert and oriented to person, place, and time. Mental status is at baseline. Procedures       MDM  Number of Diagnoses or Management Options  Acute respiratory failure with hypoxia (HCC)  ESRD (end stage renal disease) on dialysis (HCC)  Shortness of breath  Single subsegmental pulmonary embolism without acute cor pulmonale ([facility] [address]  Diagnosis management comments: This is 56-year-old female that presents to the ED with complaints of acute severe shortness of breath and chest pressure and \"not feeling well\". The patient here in the ED is 95% on 2 L of oxygen, and does not appear to be in acute distress. They are calm, alert, oriented, and pleasant to speak with. On auscultation, the patient's lungs sound clear, no abnormal heart murmurs are heard. The patient has no abdominal pain or tenderness. The patient has good pulses and capillary refill bilaterally in both upper and lower extremities. No evidence of extremity or perioral cyanosis. No signs of peripheral clubbing. No signs of extremity swellings. No evidence of neck crepitus. The patient has no significant chest wall tenderness. CTA of chest with contrast was ordered to evaluate for any signs of pulmonary embolism, effusions, ededma, pneumonia, hemothorax, rib fractures, malignancy within the lungs or other pathological fracture. Remainder of MDM will be in the ED course below. ED Course as of 02/07/23 2043 Tue Feb 07, 2023 1705 Spoke to Dr. Maisha Dolan and Dr. Roxanna Golden, accepted the patient for admission. Patient BNP elevated at 52,000, however she is a dialysis patient. However, the patient's BNP level is higher than usual.  Patient troponin elevated at 91, repeat will be ordered. Patient creatinine 3.5, baseline. No acute anemia, no other white count. Viral swabs are negative. CT reveals no concerns for PE, however there are effusions and signs of fluid overload.   Patient's ultrasound Dopplers of bilateral lower extremities reveal no concerns for DVT. No pneumothorax on x-ray. Patient remains hemodynamically stable, saturating at 97% on 2 L of oxygen. []    Spoke to Dr. Jaycob Beckman, accepted patient for admission. Urinalysis ordered and awaiting results. []      ED Course User Index  [] Andreas Bowling MD       Chart review: According to chart review, patient admitted by Dr. Kim Edwards on 2023 for rectal bleeding. The patient had Eliquis discontinued. Social determinants: Patient on dialysis 3 times a week, social support available. Daughter at bedside. Acute or chronic illness(es): Chronic PE, and respiratory compensation. Additionally, patient on Eliquis, however has peptic ulcer disease, therefore Eliquis had to be discontinued. During hospitalization stay, patient is at risk for PE.    --------------------------------------------- PAST HISTORY ---------------------------------------------  Past Medical History:  has a past medical history of Acute pulmonary embolism ([address] Anemia in CKD (chronic kidney disease), Anxiety and depression, At high risk for falls, Brain aneurysm, CLABSI (central line-associated bloodstream infection), Cough, Diabetes mellitus ([address] Diabetes mellitus type 2 with complications ([address] Dizziness on standing, End-stage renal disease on hemodialysis ([address] ESRD (end stage renal disease) ([address] ESRD on hemodialysis ([address] Essential hypertension, Fever, unspecified, Gastrointestinal hemorrhage, H/O heart artery stent, History of Clostridioides difficile colitis, Hyperlipidemia, Hypertension, Leg swelling, Lymphedema of both lower extremities, MSSA bacteremia, Nausea & vomiting, Obesity, Class III, BMI 40-49.9 (morbid obesity) ([address] Periumbilical abdominal pain, and S/P insertion of inferior vena caval filter. Past Surgical History:  has a past surgical history that includes Cardiac surgery;   section; vascular surgery (N/A, 11/24/2021); Upper gastrointestinal endoscopy (N/A, 12/01/2021); Dialysis fistula creation (Left, 01/28/2022); Colonoscopy (2017); Cardiac catheterization (2015); Percutaneous Transluminal Coronary Angio (2015); and Upper gastrointestinal endoscopy (N/A, 1/18/2023). Social History:  reports that she quit smoking about 37 years ago. Her smoking use included cigarettes. She started smoking about 49 years ago. She has a 12.00 pack-year smoking history. She has never used smokeless tobacco. She reports that she does not drink alcohol and does not use drugs. Family History: family history includes Coronary Art Dis in her brother, father, and mother. The patients home medications have been reviewed.     Allergies: Pcn [penicillins], Benzonatate, Morphine, and Sodium hypochlorite    -------------------------------------------------- RESULTS -------------------------------------------------    LABS reviewed and interpreted by me:  Results for orders placed or performed during the hospital encounter of 02/07/23   COVID-19, Rapid    Specimen: Nasopharyngeal Swab   Result Value Ref Range    SARS-CoV-2, NAAT Not Detected Not Detected   RAPID INFLUENZA A/B ANTIGENS    Specimen: Nasopharyngeal   Result Value Ref Range    Influenza A by PCR Not Detected Not Detected    Influenza B by PCR Not Detected Not Detected   CBC with Auto Differential   Result Value Ref Range    WBC 9.9 4.5 - 11.5 E9/L    RBC 2.96 (L) 3.50 - 5.50 E12/L    Hemoglobin 9.2 (L) 11.5 - 15.5 g/dL    Hematocrit 28.6 (L) 34.0 - 48.0 %    MCV 96.6 80.0 - 99.9 fL    MCH 31.1 26.0 - 35.0 pg    MCHC 32.2 32.0 - 34.5 %    RDW 15.0 11.5 - 15.0 fL    Platelets 757 328 - 943 E9/L    MPV 11.0 7.0 - 12.0 fL    Neutrophils % 80.3 (H) 43.0 - 80.0 %    Immature Granulocytes % 0.5 0.0 - 5.0 %    Lymphocytes % 9.0 (L) 20.0 - 42.0 %    Monocytes % 6.6 2.0 - 12.0 %    Eosinophils % 3.1 0.0 - 6.0 %    Basophils % 0.5 0.0 - 2.0 %    Neutrophils Absolute 7.95 (H) 1.80 - 7.30 E9/L    Immature Granulocytes # 0.05 E9/L    Lymphocytes Absolute 0.89 (L) 1.50 - 4.00 E9/L    Monocytes Absolute 0.65 0.10 - 0.95 E9/L    Eosinophils Absolute 0.31 0.05 - 0.50 E9/L    Basophils Absolute 0.05 0.00 - 0.20 E9/L   BMP   Result Value Ref Range    Sodium 137 132 - 146 mmol/L    Potassium 4.5 3.5 - 5.0 mmol/L    Chloride 94 (L) 98 - 107 mmol/L    CO2 25 22 - 29 mmol/L    Anion Gap 18 (H) 7 - 16 mmol/L    Glucose 181 (H) 74 - 99 mg/dL    BUN 15 6 - 23 mg/dL    Creatinine 3.5 (H) 0.5 - 1.0 mg/dL    Est, Glom Filt Rate 14 >=60 mL/min/1.73    Calcium 8.3 (L) 8.6 - 10.2 mg/dL   Magnesium   Result Value Ref Range    Magnesium 1.9 1.6 - 2.6 mg/dL   Brain Natriuretic Peptide   Result Value Ref Range    Pro-BNP 52,305 (H) 0 - 125 pg/mL   Troponin   Result Value Ref Range    Troponin, High Sensitivity 91 (H) 0 - 9 ng/L   Magnesium   Result Value Ref Range    Magnesium 1.9 1.6 - 2.6 mg/dL   SPECIMEN REJECTION   Result Value Ref Range    Rejected Test TRP5     Reason for Rejection see below    POCT Glucose   Result Value Ref Range    Meter Glucose 300 (H) 74 - 99 mg/dL   EKG 12 Lead   Result Value Ref Range    Ventricular Rate 80 BPM    Atrial Rate 80 BPM    P-R Interval 162 ms    QRS Duration 82 ms    Q-T Interval 412 ms    QTc Calculation (Bazett) 475 ms    P Axis 77 degrees    R Axis -14 degrees    T Axis 42 degrees       RADIOLOGY reviewed and interpreted by me:  CTA PULMONARY W CONTRAST   Final Result   1. Limited examination due to motion and suboptimal timing of contrast.  No   large central or proximal segmental pulmonary arterial embolism. Pulmonary   embolism seen on prior is not definitively visualized. Repeat CTA may be   helpful for further evaluation. 2. Increased opacities in right lower lobe suggesting atelectasis and   pneumonia. 3. New right pleural effusion. 4. Pulmonary vascular congestion and findings which could indicate right   heart failure.          US DUP LOWER EXTREMITIES BILATERAL VENOUS   Final Result   No visible DVT in either lower extremity.         XR CHEST PORTABLE   Final Result   1. Cardiomegaly.  There are no findings of CHF   2. Possible bibasilar airspace disease and or pleural effusions.             MEDICATIONS:  Medications   acetaminophen (TYLENOL) tablet 1,300 mg (has no administration in time range)   calcium acetate (PHOSLO) capsule 667 mg (667 mg Oral Given 2/7/23 1829)   glucose chewable tablet 16 g (has no administration in time range)   insulin glargine (LANTUS) injection vial 20 Units (has no administration in time range)   insulin lispro (HUMALOG) injection vial 4 Units (4 Units SubCUTAneous Given 2/7/23 1830)   midodrine (PROAMATINE) tablet 10 mg (has no administration in time range)   pantoprazole (PROTONIX) tablet 40 mg (has no administration in time range)   rosuvastatin (CRESTOR) tablet 5 mg (5 mg Oral Given 2/7/23 1829)   pantoprazole (PROTONIX) tablet 40 mg (has no administration in time range)   glucose chewable tablet 16 g (has no administration in time range)   dextrose bolus 10% 125 mL (has no administration in time range)     Or   dextrose bolus 10% 250 mL (has no administration in time range)   glucagon (rDNA) injection 1 mg (has no administration in time range)   dextrose 10 % infusion (has no administration in time range)   insulin lispro (HUMALOG) injection vial 0-4 Units (3 Units SubCUTAneous Given 2/7/23 1832)   insulin lispro (HUMALOG) injection vial 0-4 Units (has no administration in time range)   heparin (porcine) injection 5,000 Units (5,000 Units SubCUTAneous Not Given 2/7/23 1830)   acetaminophen (TYLENOL) tablet 650 mg (has no administration in time range)   iopamidol (ISOVUE-370) 76 % injection 75 mL (75 mLs IntraVENous Given 2/7/23 1427)       EKG:  This EKG is signed and interpreted by me.    Rate: 97  Rhythm: Sinus and few PVCs  Interpretation: non-specific EKG, left axis deviation, normal IN interval, normal QRS duration, mild QT  prolongation of 497, no acute ST changes  Comparison: stable as compared to patient's most recent EKG    ------------------------- NURSING NOTES AND VITALS REVIEWED ---------------------------  Date / Time Roomed:  2/7/2023 10:40 AM  ED Bed Assignment:  [id number]    The nursing notes within the ED encounter and vital signs as below have been reviewed. Patient Vitals for the past 24 hrs:   BP Temp Temp src Pulse Resp SpO2 Height Weight   02/07/23 1815 (!) 122/55 98.1 °F (36.7 °C) Axillary 81 18 94 % -- --   02/07/23 1734 120/73 97.8 °F (36.6 °C) Oral 70 20 97 % -- --   02/07/23 1726 -- -- -- -- -- -- 5' 4\" (1.626 m) 258 lb (117 kg)   02/07/23 1255 -- -- -- 82 20 95 % -- --   02/07/23 1254 136/86 -- -- 91 29 -- -- --   02/07/23 1053 132/60 -- -- -- -- -- -- --   02/07/23 1051 -- -- -- -- -- 99 % -- --   02/07/23 1049 (!) 133/121 97.5 °F (36.4 °C) Oral 80 24 100 % -- --       ------------------------------------------ PROGRESS NOTES ------------------------------------------  Re-evaluation(s):  Time: Every 30 minutes. Patients symptoms show no change  Repeat physical examination is not changed    Counseling:  I have spoken with the patient and discussed todays results, in addition to providing specific details for the plan of care and counseling regarding the diagnosis and prognosis. Their questions are answered at this time and they are agreeable with the plan of admission.    --------------------------------- ADDITIONAL PROVIDER NOTES ---------------------------------  This patient's ED course included: a personal history and physicial examination, re-evaluation prior to disposition, multiple bedside re-evaluations, IV medications, cardiac monitoring, and continuous pulse oximetry    This patient has remained hemodynamically stable during their ED course. Diagnosis:  1. Acute respiratory failure with hypoxia ([address]    2. Shortness of breath    3. ESRD (end stage renal disease) on dialysis ([address]    4. Single subsegmental pulmonary embolism without acute cor pulmonale (HCC)        Disposition:  Patient's disposition: Admit to telemetry  Patient's condition is stable. Patient was seen and evaluated by myself and my attending Trung Mccoy DO. Assessment and Plan discussed with attending provider, please see attestation for final plan of care.      MD Maranda Torres MD  Resident  02/07/23 2043

## 2023-02-07 NOTE — PROGRESS NOTES
Anticipated admission: Database initiated. Patient is A&O from home with daughter. States she uses a walker and wears 2 liters through [employer]. She has a wheelchair if she has to go far distances.  She is a NO LEFT ARM d/t fistula

## 2023-02-08 PROBLEM — Z86.711 HISTORY OF PULMONARY EMBOLUS (PE): Status: ACTIVE | Noted: 2023-02-08

## 2023-02-08 PROBLEM — I35.0 AORTIC STENOSIS, MILD: Status: ACTIVE | Noted: 2023-02-08

## 2023-02-08 PROBLEM — Z95.828 S/P INSERTION OF INFERIOR VENA CAVAL FILTER: Status: ACTIVE | Noted: 2023-01-20

## 2023-02-08 PROBLEM — N18.5 ANEMIA DUE TO STAGE 5 CHRONIC KIDNEY DISEASE, NOT ON CHRONIC DIALYSIS (HCC): Status: ACTIVE | Noted: 2023-02-08

## 2023-02-08 PROBLEM — D63.1 ANEMIA DUE TO STAGE 5 CHRONIC KIDNEY DISEASE, NOT ON CHRONIC DIALYSIS (HCC): Status: ACTIVE | Noted: 2023-02-08

## 2023-02-08 LAB
ADENOVIRUS BY PCR: NOT DETECTED
ALBUMIN SERPL-MCNC: 3.4 G/DL (ref 3.5–5.2)
ALP BLD-CCNC: 89 U/L (ref 35–104)
ALT SERPL-CCNC: 50 U/L (ref 0–32)
ANION GAP SERPL CALCULATED.3IONS-SCNC: 15 MMOL/L (ref 7–16)
AST SERPL-CCNC: 16 U/L (ref 0–31)
BASOPHILS ABSOLUTE: 0.04 E9/L (ref 0–0.2)
BASOPHILS RELATIVE PERCENT: 0.6 % (ref 0–2)
BILIRUB SERPL-MCNC: 0.4 MG/DL (ref 0–1.2)
BILIRUBIN DIRECT: <0.2 MG/DL (ref 0–0.3)
BILIRUBIN, INDIRECT: ABNORMAL MG/DL (ref 0–1)
BORDETELLA PARAPERTUSSIS BY PCR: NOT DETECTED
BORDETELLA PERTUSSIS BY PCR: NOT DETECTED
BUN BLDV-MCNC: 23 MG/DL (ref 6–23)
CALCIUM IONIZED: 1.09 MMOL/L (ref 1.15–1.33)
CALCIUM SERPL-MCNC: 8.4 MG/DL (ref 8.6–10.2)
CHLAMYDOPHILIA PNEUMONIAE BY PCR: NOT DETECTED
CHLORIDE BLD-SCNC: 96 MMOL/L (ref 98–107)
CHOLESTEROL, TOTAL: 151 MG/DL (ref 0–199)
CO2: 26 MMOL/L (ref 22–29)
CORONAVIRUS 229E BY PCR: NOT DETECTED
CORONAVIRUS HKU1 BY PCR: NOT DETECTED
CORONAVIRUS NL63 BY PCR: NOT DETECTED
CORONAVIRUS OC43 BY PCR: NOT DETECTED
CREAT SERPL-MCNC: 5.1 MG/DL (ref 0.5–1)
EOSINOPHILS ABSOLUTE: 0.41 E9/L (ref 0.05–0.5)
EOSINOPHILS RELATIVE PERCENT: 5.9 % (ref 0–6)
FOLATE: 10.9 NG/ML (ref 4.8–24.2)
GFR SERPL CREATININE-BSD FRML MDRD: 9 ML/MIN/1.73
GLUCOSE BLD-MCNC: 191 MG/DL (ref 74–99)
HBA1C MFR BLD: 10.3 % (ref 4–5.6)
HBA1C MFR BLD: 10.3 % (ref 4–5.6)
HCT VFR BLD CALC: 27.9 % (ref 34–48)
HDLC SERPL-MCNC: 43 MG/DL
HEMOGLOBIN: 8.6 G/DL (ref 11.5–15.5)
HUMAN METAPNEUMOVIRUS BY PCR: NOT DETECTED
HUMAN RHINOVIRUS/ENTEROVIRUS BY PCR: NOT DETECTED
IMMATURE GRANULOCYTES #: 0.03 E9/L
IMMATURE GRANULOCYTES %: 0.4 % (ref 0–5)
INFLUENZA A BY PCR: NOT DETECTED
INFLUENZA B BY PCR: NOT DETECTED
INR BLD: 1.2
LACTIC ACID, SEPSIS: 1.8 MMOL/L (ref 0.5–1.9)
LDL CHOLESTEROL CALCULATED: 78 MG/DL (ref 0–99)
LYMPHOCYTES ABSOLUTE: 0.82 E9/L (ref 1.5–4)
LYMPHOCYTES RELATIVE PERCENT: 11.8 % (ref 20–42)
MAGNESIUM: 2 MG/DL (ref 1.6–2.6)
MCH RBC QN AUTO: 30.9 PG (ref 26–35)
MCHC RBC AUTO-ENTMCNC: 30.8 % (ref 32–34.5)
MCV RBC AUTO: 100.4 FL (ref 80–99.9)
METER GLUCOSE: 107 MG/DL (ref 74–99)
METER GLUCOSE: 152 MG/DL (ref 74–99)
METER GLUCOSE: 182 MG/DL (ref 74–99)
METER GLUCOSE: 192 MG/DL (ref 74–99)
MONOCYTES ABSOLUTE: 0.57 E9/L (ref 0.1–0.95)
MONOCYTES RELATIVE PERCENT: 8.2 % (ref 2–12)
MYCOPLASMA PNEUMONIAE BY PCR: NOT DETECTED
NEUTROPHILS ABSOLUTE: 5.09 E9/L (ref 1.8–7.3)
NEUTROPHILS RELATIVE PERCENT: 73.1 % (ref 43–80)
PARAINFLUENZA VIRUS 1 BY PCR: NOT DETECTED
PARAINFLUENZA VIRUS 2 BY PCR: NOT DETECTED
PARAINFLUENZA VIRUS 3 BY PCR: NOT DETECTED
PARAINFLUENZA VIRUS 4 BY PCR: NOT DETECTED
PDW BLD-RTO: 14.9 FL (ref 11.5–15)
PHOSPHORUS: 4.4 MG/DL (ref 2.5–4.5)
PLATELET # BLD: 194 E9/L (ref 130–450)
PMV BLD AUTO: 10.3 FL (ref 7–12)
POTASSIUM SERPL-SCNC: 4.2 MMOL/L (ref 3.5–5)
PRO-BNP: ABNORMAL PG/ML (ref 0–125)
PROCALCITONIN: 0.58 NG/ML (ref 0–0.08)
PROTHROMBIN TIME: 13 SEC (ref 9.3–12.4)
RBC # BLD: 2.78 E12/L (ref 3.5–5.5)
RESPIRATORY SYNCYTIAL VIRUS BY PCR: NOT DETECTED
SARS-COV-2, PCR: NOT DETECTED
SODIUM BLD-SCNC: 137 MMOL/L (ref 132–146)
TOTAL PROTEIN: 6.8 G/DL (ref 6.4–8.3)
TRIGL SERPL-MCNC: 148 MG/DL (ref 0–149)
TSH SERPL DL<=0.05 MIU/L-ACNC: 2.97 UIU/ML (ref 0.27–4.2)
URIC ACID, SERUM: 5.1 MG/DL (ref 2.4–5.7)
VITAMIN B-12: 550 PG/ML (ref 211–946)
VLDLC SERPL CALC-MCNC: 30 MG/DL
WBC # BLD: 7 E9/L (ref 4.5–11.5)

## 2023-02-08 PROCEDURE — 83880 ASSAY OF NATRIURETIC PEPTIDE: CPT

## 2023-02-08 PROCEDURE — 80061 LIPID PANEL: CPT

## 2023-02-08 PROCEDURE — 6360000002 HC RX W HCPCS: Performed by: INTERNAL MEDICINE

## 2023-02-08 PROCEDURE — 2500000003 HC RX 250 WO HCPCS: Performed by: INTERNAL MEDICINE

## 2023-02-08 PROCEDURE — 80048 BASIC METABOLIC PNL TOTAL CA: CPT

## 2023-02-08 PROCEDURE — 83036 HEMOGLOBIN GLYCOSYLATED A1C: CPT

## 2023-02-08 PROCEDURE — 2060000000 HC ICU INTERMEDIATE R&B

## 2023-02-08 PROCEDURE — 83735 ASSAY OF MAGNESIUM: CPT

## 2023-02-08 PROCEDURE — 87040 BLOOD CULTURE FOR BACTERIA: CPT

## 2023-02-08 PROCEDURE — 6370000000 HC RX 637 (ALT 250 FOR IP): Performed by: INTERNAL MEDICINE

## 2023-02-08 PROCEDURE — 90935 HEMODIALYSIS ONE EVALUATION: CPT

## 2023-02-08 PROCEDURE — 85610 PROTHROMBIN TIME: CPT

## 2023-02-08 PROCEDURE — 82330 ASSAY OF CALCIUM: CPT

## 2023-02-08 PROCEDURE — 2700000000 HC OXYGEN THERAPY PER DAY

## 2023-02-08 PROCEDURE — 82607 VITAMIN B-12: CPT

## 2023-02-08 PROCEDURE — 84550 ASSAY OF BLOOD/URIC ACID: CPT

## 2023-02-08 PROCEDURE — 80076 HEPATIC FUNCTION PANEL: CPT

## 2023-02-08 PROCEDURE — 97165 OT EVAL LOW COMPLEX 30 MIN: CPT

## 2023-02-08 PROCEDURE — 84443 ASSAY THYROID STIM HORMONE: CPT

## 2023-02-08 PROCEDURE — 36415 COLL VENOUS BLD VENIPUNCTURE: CPT

## 2023-02-08 PROCEDURE — 83605 ASSAY OF LACTIC ACID: CPT

## 2023-02-08 PROCEDURE — 97161 PT EVAL LOW COMPLEX 20 MIN: CPT

## 2023-02-08 PROCEDURE — 84100 ASSAY OF PHOSPHORUS: CPT

## 2023-02-08 PROCEDURE — 85025 COMPLETE CBC W/AUTO DIFF WBC: CPT

## 2023-02-08 PROCEDURE — 82746 ASSAY OF FOLIC ACID SERUM: CPT

## 2023-02-08 PROCEDURE — 84145 PROCALCITONIN (PCT): CPT

## 2023-02-08 PROCEDURE — 82962 GLUCOSE BLOOD TEST: CPT

## 2023-02-08 RX ADMIN — INSULIN GLARGINE 20 UNITS: 100 INJECTION, SOLUTION SUBCUTANEOUS at 20:09

## 2023-02-08 RX ADMIN — INSULIN LISPRO 4 UNITS: 100 INJECTION, SOLUTION INTRAVENOUS; SUBCUTANEOUS at 10:56

## 2023-02-08 RX ADMIN — CALCIUM ACETATE 667 MG: 667 CAPSULE ORAL at 10:55

## 2023-02-08 RX ADMIN — CALCIUM ACETATE 667 MG: 667 CAPSULE ORAL at 17:19

## 2023-02-08 RX ADMIN — PANTOPRAZOLE SODIUM 40 MG: 40 TABLET, DELAYED RELEASE ORAL at 17:19

## 2023-02-08 RX ADMIN — HEPARIN SODIUM 5000 UNITS: 10000 INJECTION INTRAVENOUS; SUBCUTANEOUS at 02:06

## 2023-02-08 RX ADMIN — ACETAMINOPHEN 650 MG: 325 TABLET ORAL at 22:09

## 2023-02-08 RX ADMIN — INSULIN LISPRO 4 UNITS: 100 INJECTION, SOLUTION INTRAVENOUS; SUBCUTANEOUS at 08:42

## 2023-02-08 RX ADMIN — HEPARIN SODIUM 5000 UNITS: 10000 INJECTION INTRAVENOUS; SUBCUTANEOUS at 17:24

## 2023-02-08 RX ADMIN — ROSUVASTATIN CALCIUM 5 MG: 5 TABLET, FILM COATED ORAL at 08:42

## 2023-02-08 RX ADMIN — HEPARIN SODIUM 5000 UNITS: 10000 INJECTION INTRAVENOUS; SUBCUTANEOUS at 10:56

## 2023-02-08 ASSESSMENT — PAIN SCALES - GENERAL
PAINLEVEL_OUTOF10: 7
PAINLEVEL_OUTOF10: 0

## 2023-02-08 ASSESSMENT — PAIN DESCRIPTION - LOCATION: LOCATION: BACK

## 2023-02-08 ASSESSMENT — PAIN DESCRIPTION - DESCRIPTORS: DESCRIPTORS: ACHING

## 2023-02-08 ASSESSMENT — PAIN DESCRIPTION - ORIENTATION: ORIENTATION: LOWER

## 2023-02-08 NOTE — PROGRESS NOTES
Occupational Therapy  OCCUPATIONAL THERAPY INITIAL EVALUATION  [facility], [place]  [address], [facility], [place]    Date: 2023     Patient Name: Kirsten Romero  MRN: [id number]  : 1955  Room: [address] [place] [address]    Evaluating OT: Vashti Phillips [place], OTR/L - [id number]    Referring Provider: Madeline Lee DO  Specific Provider Orders/Date: \"OT eval and treat\" - 2023    Diagnosis: Shortness of breath [R06.02]  ESRD (end stage renal disease) on dialysis ([address] [N18.6, Z99.2]  Acute respiratory failure with hypoxia ([address] [J96.01]  Acute on chronic diastolic CHF (congestive heart failure) ([address] [I50.33]  Single subsegmental pulmonary embolism without acute cor pulmonale ([address] [I26.93]     Pertinent Medical History: recent IVC filter insertion (2023), DM, ESRD, HTN, anxiety and depression, lymphedema, obesity     Precautions: fall risk, bed/chair alarms, 2L O2 via nasal cannula, visual impairment    Assessment of Current Deficits:    [x] Functional mobility   [x]ADLs  [x] Strength               [x]Cognition   [x] Functional transfers   [x] IADLs         [x] Safety Awareness   [x]Endurance   [] Fine Coordination              [x] Balance      [] Vision/perception   [x]Sensation    []Gross Motor Coordination  [] ROM  [] Delirium                   [] Motor Control     OT PLAN OF CARE   OT POC is based on physician orders, patient diagnosis, and results of clinical assessment.   Frequency/Duration 2-5 days/week for 2 weeks PRN   Specific OT Treatment Interventions to Include:   * Instruction/training on adapted ADL techniques and AE recommendations to increase functional independence within precautions       * Training on energy conservation strategies, correct breathing pattern and techniques to improve independence/tolerance for self-care routine  * Functional transfer/mobility training/DME recommendations for increased independence, safety, and fall prevention  * Patient/Family education to increase follow through with safety techniques and functional independence  * Recommendation of environmental modifications for increased safety with functional transfers/mobility and ADLs  * Therapeutic exercise to improve motor endurance, ROM, and functional strength for ADLs/functional transfers  * Therapeutic activities to facilitate/challenge dynamic balance, stand tolerance for increased safety and independence with ADLs  * Neuro-muscular re-education: facilitation of righting/equilibrium reactions, midline orientation, scapular stability/mobility, normalization of muscle tone, and facilitation of volitional active controled movement  * Positioning to improve skin integrity, interaction with environment and functional independence    Recommended Adaptive Equipment: TBD    Home Living: Patient lives with her daughter in a one-floor plan. Bathroom Setup: tub shower (with grab bar, but no seat) and elevated toilet seat  Equipment Owned: walker, wheelchair, home O2    Prior Level of Function (PLOF): Patient is typically independent with ADLs; patient's daughter is primarily responsible for completion of IADLs. Patient had been using a walker for functional mobility short distances. Pain Level: Patient denied experiencing pain. Cognition: Patient alert and oriented x3. WFL command follow demonstrated. Patient pleasant, cooperative, and motivated to return to [facility] and home environment.   Memory: WFL  Sequencing: WFL  Problem Solving: WFL  Judgement/Safety: WFL grossly    Functional Assessment:  AM-PAC Daily Activity Raw Score: 16/24   Initial Eval Status  Date: 2/8/2023 Treatment Status  Date:  Short Term Goals = Long Term Goals   Feeding Setup  Independent   Grooming CGA  Mod I / Independent (seated/standing at sink)   UB Dressing Setup  Mod I / Independent (including item retrieval)   LB Dressing Mod A  Mod I / Independent - with use of AE, as needed/appropriate   Bathing Mod A  Mod I / [address] - with use of AE/DME, as needed/appropriate   Toileting CGA  Mod I / Independent   Bed Mobility  Supine-to-Sit: Min A  Assistance needed with L LE. Independent / Mod I in order to maximize patient's independence with ADLs, re-positioning, and other functional tasks. Functional Transfers Sit-to-Stand: SBA   from EOB  Independent   Functional Mobility SBA (with walker) within patient's room and hallway. Shortness of breath noted with minimal functional mobility; patient's O2 saturation was 85% initially upon sitting in bedside chair (wearing 2L of O2). Patient's O2 saturations improved to 90% with seated rest.   Mod I with functional mobility (with device, as needed/appropriate) in order to maximize independence with ADLs/IADLs and other functional tasks. Balance Sitting: Good (at EOB)  Standing: Fair (with walker)  Fair+ dynamic standing balance during completion of ADLs/IADLs and other functional tasks. Activity Tolerance Fair-  Patient will demonstrate Good understanding and consistent implementation of energy conservation techniques and work simplification techniques into ADL/IADL routines. Visual/  Perceptual Impaired  Patient reported having impaired vision in B eyes, which she attributed to cataracts. N/A   B UE Strength 4-/5  Patient will demonstrate 4+/5 B UE strength in order to maximize independence with ADLs/IADLs and functional transfers. Additional Long-Term Goal: Patient will increase functional independence to PLOF in order to allow patient to live in least restrictive environment. ROM: Additional Information:    R UE  WFL    L UE WFL      Hearing: WFL  Sensation: No complaints of numbness/tingling in B UEs. Tone: WFL  Edema: No    Comments: RN approved patient's participation in [address] [place] [address] activities. Upon arrival, patient supine in bed with patient's RN present.  At end of session, patient seated in bedside chair with call light and phone within reach, chair alarm activated, and all lines and tubes intact. Patient would benefit from continued skilled OT to increase safety and independence with completion of ADL/IADL tasks for functional independence and quality of life. Patient education provided regardin) importance of having staff assistance with ADLs and other OOB activities to prevent falls/injury during hospitalization. Patient indicated understanding. Rehab Potential: Good for established goals. Patient / Family Goal: Patient anticipates returning home upon discharge. Patient and/or family were instructed on functional diagnosis, prognosis/goals, and OT plan of care. Demonstrated Good understanding. Eval Complexity: Low    Time In: 1110  Time Out: 1125  Total Treatment Time: 0 minutes      Minutes Units   OT Eval Low 76076 15 1   OT Eval Medium 32414     OT Eval High 80447     OT Re-Eval [id number]     Therapeutic Ex 10621     Therapeutic Activities 62198     ADL/Self Care 33255     Orthotic Management 50705     Neuro Re-Ed 76794     Non-Billable Time N/A ---     Evaluation time includes thorough review of current medical information, gathering information on past medical history/social history and prior level of function, completion of standardized testing/informal observation of tasks, assessment of data, and education on plan of care and goals. Tess Bernard, OTR/L  License Number: [id number]

## 2023-02-08 NOTE — CONSULTS
[facility]  [address] [facility], [address]  [place], [address] [place] [address]    Pulmonary Consultation      Reason for Consult:  sob    Requesting Physician:  Odessa Queen MD    CHIEF COMPLAINT:  sob    History Obtained From:  patient    HISTORY OF PRESENT ILLNESS:                The patient is a 78 y.o. female with significant past medical history of ESRD on HD and PE s/p IVC filter who presents with increased sob and cough for a few days. Mentions getting a fever on Saturday with nausea (missed HD that day), got better next day, developed sob and cough with not much sputum production on Monday and gradually got worse, seen at HD yesterday and noted to be hypoxic (usually on 2 li NC) so told to come to hospital. Currently on 3 li NC, slightly better with breathing, similar with cough, lying down in bed getting IV line placed, looking comfortable with breathing, mentions increased swelling of left lower ext.      Past Medical History:        Diagnosis Date    Acute pulmonary embolism ([address] 12/03/2022    Anemia in CKD (chronic kidney disease) 11/30/2021    Anxiety and depression 01/19/2022    At high risk for falls 01/19/2022    Brain aneurysm     CLABSI (central line-associated bloodstream infection) 11/19/2021    Cough 01/19/2022    Diabetes mellitus ([address] 2006    Diabetes mellitus type 2 with complications ([address] 62/97/4538    Dizziness on standing 01/19/2022    End-stage renal disease on hemodialysis ([address] 01/19/2023    ESRD (end stage renal disease) ([address] 11/19/2021    ESRD on hemodialysis ([address] 11/19/2021    Essential hypertension 11/30/2021    Fever, unspecified     Gastrointestinal hemorrhage 11/30/2021    H/O heart artery stent 2015    Done in [place]    History of Clostridioides difficile colitis 01/19/2022    Hyperlipidemia     Hypertension     Leg swelling 01/19/2023    Lymphedema of both lower extremities 01/19/2023    MSSA bacteremia 11/18/2021 Nausea & vomiting 11/18/2021    Obesity, Class III, BMI 40-49.9 (morbid obesity) ([facility] 69/63/0003    Periumbilical abdominal pain     S/P insertion of inferior vena caval filter 01/20/2023       Past Surgical History:        Procedure Laterality Date    CARDIAC CATHETERIZATION  2015    Done in [address] [place] [address] Rd  2017    Negative findings    DIALYSIS FISTULA CREATION Left 01/28/2022    REVISION AV FISTULA LEFT ARM performed by Mark Grubbs MD at [address] [place] [address]    PTCA  2015    PTCA [address] [place] [address] N/A 12/01/2021    EGD BIOPSY performed by Phineas Mcburney, MD at [address] [place] [address] [place] 1/18/2023    EGD BIOPSY performed by Phineas Mcburney, MD at [address] [place] [address] N/A 11/24/2021    CATHETER INSERTION  TUNNELED HEMODIALYSIS, WITH REMOVAL OF TEMPORARY CATHETER performed by Mark Grubbs MD at [address] [place] [address]       Current Medications:    Current Facility-Administered Medications: acetaminophen (TYLENOL) tablet 1,300 mg, 1,300 mg, Oral, Q8H PRN  calcium acetate (PHOSLO) capsule 667 mg, 667 mg, Oral, TID WC  glucose chewable tablet 16 g, 4 tablet, Oral, PRN  insulin glargine (LANTUS) injection vial 20 Units, 20 Units, SubCUTAneous, Nightly  insulin lispro (HUMALOG) injection vial 4 Units, 4 Units, SubCUTAneous, TID WC  midodrine (PROAMATINE) tablet 10 mg, 10 mg, Oral, Daily PRN  pantoprazole (PROTONIX) tablet 40 mg, 40 mg, Oral, BID AC  rosuvastatin (CRESTOR) tablet 5 mg, 5 mg, Oral, Daily  pantoprazole (PROTONIX) tablet 40 mg, 40 mg, Oral, QAM AC  glucose chewable tablet 16 g, 4 tablet, Oral, PRN  dextrose bolus 10% 125 mL, 125 mL, IntraVENous, PRN **OR** dextrose bolus 10% 250 mL, 250 mL, IntraVENous, PRN  glucagon (rDNA) injection 1 mg, 1 mg, IntraMUSCular, PRN  dextrose 10 % infusion, , IntraVENous, Continuous PRN  insulin lispro (HUMALOG) injection vial 0-4 Units, 0-4 Units, SubCUTAneous, TID WC  insulin lispro (HUMALOG) injection vial 0-4 Units, 0-4 Units, SubCUTAneous, Nightly  heparin (porcine) injection 5,000 Units, 5,000 Units, SubCUTAneous, Q8H  acetaminophen (TYLENOL) tablet 650 mg, 650 mg, Oral, Q4H PRN    Allergies:  Pcn [penicillins], Benzonatate, Morphine, and Sodium hypochlorite    Social History:    TOBACCO:   reports that she quit smoking about 37 years ago. Her smoking use included cigarettes. She started smoking about 49 years ago. She has a 12.00 pack-year smoking history. She has never used smokeless tobacco.    Family History:       Problem Relation Age of Onset    Coronary Art Dis Mother          age 62 acute MI    Coronary Art Dis Father          age 62 CVA    Coronary Art Dis Brother        REVIEW OF SYSTEMS:    RESPIRATORY:  sob and cough  CARDIOVASCULAR:  leg swelling  Remainder of complete ROS is negative. PHYSICAL EXAM:      Vitals:    BP (!) 155/70   Pulse 78   Temp 97.9 °F (36.6 °C) (Oral)   Resp 20   Ht 5' 4\" (1.626 m)   Wt 258 lb (117 kg)   SpO2 96%   BMI 44.29 kg/m²     CONSTITUTIONAL:  obese  EYES:  Lids and lashes normal, pupils equal, round and reactive to light, extra ocular muscles intact, sclera clear, conjunctiva normal  ENT:  Normocephalic, without obvious abnormality, atraumatic, sinuses nontender on palpation, external ears without lesions, oral pharynx with moist mucus membranes, tonsils without erythema or exudates, gums normal and good dentition. LUNGS:  minimal bibasal ronchi  CARDIOVASCULAR:  Normal apical impulse, regular rate and rhythm, normal S1 and S2, no S3 or S4, and no murmur noted  ABDOMEN:  No scars, normal bowel sounds, soft, non-distended, non-tender, no masses palpated, no hepatosplenomegally  MUSCULOSKELETAL:  minimal bipedal edema  NEUROLOGIC:  Awake, alert, oriented to name, place and time. Cranial nerves II-XII are grossly intact.     DATA:    CBC:   Recent Labs     23  1156   WBC 9.9   HGB 9.2* HCT 28.6*   MCV 96.6          BMP:  Recent Labs     02/07/23  1156      K 4.5   CL 94*   CO2 25   BUN 15   CREATININE 3.5*    ALB:3,BILIDIR:3,BILITOT:3,ALKPHOS:3)@    PT/INR: No results for input(s): PROTIME, INR in the last 72 hours. ABG:   No results for input(s): PH, PO2, PCO2, HCO3, BE, O2SAT, METHB, O2HB, COHB, O2CON, HHB, THB in the last 72 hours. Radiology Review:  CTA chest reviewed with some resolution of PE and new right pleural effusion with right lower lobe congestion/atelectasis compared to previous    IMPRESSION/RECOMMENDATIONS:      Sob/hypoxia  ESRD on HD  Hx of PE s/p IVC filter  Pleural effusion    Cont with HD as per Renal, watch fluid balance, can repeat CXR tomorrow and see if any need for thoracentesis or not  Cont with oxygen, taper as tolerated  No lung medications, observe respiratory function  OOB to chair, ambulate as tolerated      Time at the bedside, reviewing labs and radiographs, reviewing notes and consultations, discussing with staff and family was more than 55 minutes. Thanks for letting us see this patient in consultation. Please contact us with any questions. Office [phone] or after hours through [employer], x [phone].

## 2023-02-08 NOTE — CONSULTS
[facility] MD Peter Segovia MD Emilio Meyers, MD Shelvia Salinas, MD Morrie Crown, CASH Rao, YOSHI  Consultation  Patient's Name: Jadyn Clayton   2/8/2023      Reason for Consult:  ESRD  Requesting Physician:  Nithin Linton MD    Chief Complaint: Shortness of breath    History Obtained From: Patient, chart    History of Present Ilness:         Jadyn Clayton is a 78year old woman that was sent to the ED from dialysis for a complaint of shortness of breath on 2/7/23. She arrived to dialysis feeling short of breath and her pulse ox was 82%. She was placed on oxygen per [place] and the pulse ox came up. She completed her entire dialysis treatment but reported that she just didn't feel well. Her pulse ox was 86% after the treatment and still felt SOB so she was instructed to go to the ED. She has a PMH that includes:  Acute on chronic heart failure, acute PE, anemia due to CKD, brain aneurysm, diabetes mellitus, ESRD on hemodialysis, hypertension GI hemorrhage, HLD, lymphedema of lower extremities and most recently S/P insertion of vena cava filter. She was hospitalized on 1/17/23 with a GI bleed and EGD revealed gastritis, severe duodenitis with superficial ulceration. She was eliquis for a recent hospitalization for a PE which was put on hold due to the GI bleed. She had a vena cava filter placed by [facility]. She has a follow-up EGD in the AM with Dr. Trudi Lanes. She is well known to Dr. Chanelle Miller for management and treatment of her ESRD. She receives outpatient hemodialysis at [facility] on a TTS schedule. Pulmonary CTA revealed a new pleural effusion and pulmonary vascular congestion that could indicate right heart failure. PE seen on prior study was not visualized. Repeat CTA was suggested. Bilateral lower extremity lymphedema noted. Ultrasound was negative for DVT. She was  Admitted for further evaluation.      Past Medical History:   Diagnosis Date    Acute on chronic heart failure with preserved ejection fraction ([address] 2/7/2023    Acute pulmonary embolism ([address] 12/03/2022    Anemia due to stage 5 chronic kidney disease, not on chronic dialysis ([address] 2/8/2023    Anemia in CKD (chronic kidney disease) 11/30/2021    Anxiety and depression 01/19/2022    Aortic stenosis, mild 2/8/2023 12/2022    At high risk for falls 01/19/2022    Brain aneurysm     CLABSI (central line-associated bloodstream infection) 11/19/2021    Cough 01/19/2022    Diabetes mellitus ([address] 2006    Diabetes mellitus type 2 with complications ([address] 63/71/4467    Dizziness on standing 01/19/2022    End-stage renal disease on hemodialysis ([address] 01/19/2023    ESRD (end stage renal disease) ([address] 11/19/2021    ESRD on hemodialysis ([address] 11/19/2021    Essential hypertension 11/30/2021    Fever, unspecified     Gastrointestinal hemorrhage 11/30/2021    H/O heart artery stent 2015    Done in [place]    History of Clostridioides difficile colitis 01/19/2022    History of pulmonary embolus (PE) 2/8/2023 12/2022    Hyperlipidemia     Hypertension     Leg swelling 01/19/2023    Lymphedema of both lower extremities 01/19/2023    MSSA bacteremia 11/18/2021    Nausea & vomiting 11/18/2021    Obesity, Class III, BMI 40-49.9 (morbid obesity) ([address] 77/85/7660    Periumbilical abdominal pain     S/P insertion of inferior vena caval filter 01/20/2023       Past Surgical History:   Procedure Laterality Date    CARDIAC CATHETERIZATION  2015    Done in [address] [place] [address] Rd  2017    Negative findings    DIALYSIS FISTULA CREATION Left 01/28/2022    REVISION AV FISTULA LEFT ARM performed by Nnamdi Desouza MD at [address] [place] [address]    IVC FILTER INSERTION  01/20/2023    DVT and pulmonary emboli, bleeding duodenal ulcer    PTCA  2015    PTCA [facility]    UPPER GASTROINTESTINAL ENDOSCOPY N/A 12/01/2021    EGD BIOPSY performed by Syeda Álvarez MD at [facility] ENDOSCOPY    UPPER GASTROINTESTINAL ENDOSCOPY N/A 2023    EGD BIOPSY performed by Syeda Álvarez MD at [facility] ENDOSCOPY    VASCULAR SURGERY N/A 2021    CATHETER INSERTION  TUNNELED HEMODIALYSIS, WITH REMOVAL OF TEMPORARY CATHETER performed by Eber Henry MD at [facility] OR       Family History   Problem Relation Age of Onset    Coronary Art Dis Mother          age 58 acute MI    Coronary Art Dis Father          age 57 CVA    Coronary Art Dis Brother          Review of Systems:   Constitutional: negative for chills, fatigue and  fevers.  POSITIVE for malaise  Eyes: negative for icterus, irritation, redness, and visual disturbance  Ears, nose, mouth, throat, and face: negative for ear drainage, earaches, hearing loss, nasal congestion, sore mouth, sore throat, and tinnitus  Respiratory: POSITIVE for shortness of breath and diminished breath sounds. NEGATIVE for cough or wheeze  Cardiovascular: negative for chest pain, chest pressure/discomfort, dyspnea, and palpitations.  POSITIVE for BLE lymphedema  Gastrointestinal: negative for abdominal pain, diarrhea, melena, nausea, and vomiting  Genitourinary: negative for dysuria, frequency, and hematuria  Integument/breast: negative for pruritus, rash, and skin lesion(s)  Hematologic/lymphatic: negative for bleeding, easy bruising, and petechiae  Musculoskeletal: negative for arthralgias, muscle weakness, myalgias, and stiff joints  Neurological: negative for dizziness, headaches, vertigo, and weakness  Behavioral/Psych: negative for anxiety, depression, and irritability      Constitutional:  in no acute distress  HEENT: NC/AT, EOMI, sclera and conjunctiva are clear and anicteric, mucus membranes moist  Neck: Trachea midline, no JVD  Cardiovascular: S1, S2 regular rhythm, no murmur,or rub  Respiratory:  Diminished breath sounds .No crackles, no wheeze  Gastrointestinal:  Soft, nontender, nondistended, NABS  Ext: BLE  lymphedema, lower extremity erythema, Feet warm  Skin: dry, rash  Neuro: awake, alert, interactive    Data:   Labs:  CBC with Differential:    Lab Results   Component Value Date/Time    WBC 7.0 02/08/2023 10:30 AM    RBC 2.78 02/08/2023 10:30 AM    HGB 8.6 02/08/2023 10:30 AM    HCT 27.9 02/08/2023 10:30 AM     02/08/2023 10:30 AM    .4 02/08/2023 10:30 AM    MCH 30.9 02/08/2023 10:30 AM    MCHC 30.8 02/08/2023 10:30 AM    RDW 14.9 02/08/2023 10:30 AM    NRBC 0.0 01/13/2022 04:38 AM    LYMPHOPCT 11.8 02/08/2023 10:30 AM    MONOPCT 8.2 02/08/2023 10:30 AM    BASOPCT 0.6 02/08/2023 10:30 AM    MONOSABS 0.57 02/08/2023 10:30 AM    LYMPHSABS 0.82 02/08/2023 10:30 AM    EOSABS 0.41 02/08/2023 10:30 AM    BASOSABS 0.04 02/08/2023 10:30 AM     CMP:    Lab Results   Component Value Date/Time     02/08/2023 08:50 AM    K 4.2 02/08/2023 08:50 AM    K 4.2 08/23/2022 12:12 PM    CL 96 02/08/2023 08:50 AM    CO2 26 02/08/2023 08:50 AM    BUN 23 02/08/2023 08:50 AM    CREATININE 5.1 02/08/2023 08:50 AM    GFRAA 8 09/22/2022 12:14 PM    LABGLOM 9 02/08/2023 08:50 AM    GLUCOSE 191 02/08/2023 08:50 AM    PROT 6.8 02/08/2023 08:50 AM    LABALBU 3.4 02/08/2023 08:50 AM    CALCIUM 8.4 02/08/2023 08:50 AM    BILITOT 0.4 02/08/2023 08:50 AM    ALKPHOS 89 02/08/2023 08:50 AM    AST 16 02/08/2023 08:50 AM    ALT 50 02/08/2023 08:50 AM     Ionized Calcium:  No results found for: IONCA  Magnesium:    Lab Results   Component Value Date/Time    MG 2.0 02/08/2023 08:50 AM     Phosphorus:    Lab Results   Component Value Date/Time    PHOS 4.4 02/08/2023 08:50 AM     U/A:    Lab Results   Component Value Date/Time    COLORU Yellow 11/18/2021 03:19 AM    PHUR 6.0 11/18/2021 03:19 AM    WBCUA 1-3 11/18/2021 03:19 AM    RBCUA 0-1 11/18/2021 03:19 AM    BACTERIA FEW 11/18/2021 03:19 AM    CLARITYU Clear 11/18/2021 03:19 AM    SPECGRAV 1.020 11/18/2021 03:19 AM    LEUKOCYTESUR Negative 11/18/2021 03:19 AM    UROBILINOGEN 0.2 11/18/2021 03:19 AM    BILIRUBINUR Negative 11/18/2021 03:19 AM    BLOODU SMALL 11/18/2021 03:19 AM    GLUCOSEU 500 11/18/2021 03:19 AM     Microalbumen/Creatinine ratio:  No components found for: RUCREAT  Iron Saturation:  No components found for: PERCENTFE  TIBC:    Lab Results   Component Value Date/Time    TIBC 173 01/31/2023 06:35 AM     FERRITIN:    Lab Results   Component Value Date/Time    FERRITIN 1,357 01/31/2023 06:35 AM        Imaging:  CTA PULMONARY W CONTRAST   Final Result   1. Limited examination due to motion and suboptimal timing of contrast.  No   large central or proximal segmental pulmonary arterial embolism. Pulmonary   embolism seen on prior is not definitively visualized. Repeat CTA may be   helpful for further evaluation. 2. Increased opacities in right lower lobe suggesting atelectasis and   pneumonia. 3. New right pleural effusion. 4. Pulmonary vascular congestion and findings which could indicate right   heart failure. US DUP LOWER EXTREMITIES BILATERAL VENOUS   Final Result   No visible DVT in either lower extremity. XR CHEST PORTABLE   Final Result   1. Cardiomegaly. There are no findings of CHF   2. Possible bibasilar airspace disease and or pleural effusions. XR CHEST PORTABLE    (Results Pending)       Assessment   End Stage Renal Disease due to diabetic Nephropathy and Renal Microvascular Atherosclerotic Disease  Anemia due to CKD  Essential Hypertension  Secondary Hyperparathyroidism  Pleural effusion  PE- S/P IVC filter (was on eliquis but held d/t GIB_  Right sided heart failure      Plan  Continue IHD for removal of solutes and volume on a TTS schedule  Additional dialysis treatment today and will evaluate for additional treatments on a daily basis  Monitor labs  Monitor I & O  Continue current renal care      Thank you for the opportunity to participate in the care of your pleasant patient.   We look forward to following along with you.      Patient seen and examined. Findings and physical exam confirmed independently by me. Case discussed with Darlin Richards CNP. As below, except as annotated.

## 2023-02-08 NOTE — PROGRESS NOTES
Physical Therapy  Facility/Department: [address] INTERNAL MEDICINE 2  Physical Therapy Initial Assessment    Name: Viral Brooks  : 1955  MRN: [id number]  Date of Service: 2023      Patient Diagnosis(es): The primary encounter diagnosis was Acute respiratory failure with hypoxia ([address] Diagnoses of Shortness of breath, ESRD (end stage renal disease) on dialysis [facility]), and Single subsegmental pulmonary embolism without acute cor pulmonale (HCC) were also pertinent to this visit. Past Medical History:  has a past medical history of Acute on chronic heart failure with preserved ejection fraction ([address] Acute pulmonary embolism ([address] Anemia due to stage 5 chronic kidney disease, not on chronic dialysis ([address] Anemia in CKD (chronic kidney disease), Anxiety and depression, Aortic stenosis, mild, At high risk for falls, Brain aneurysm, CLABSI (central line-associated bloodstream infection), Cough, Diabetes mellitus ([address] Diabetes mellitus type 2 with complications (HCC), Dizziness on standing, End-stage renal disease on hemodialysis ([address] ESRD (end stage renal disease) ([address] ESRD on hemodialysis ([address] Essential hypertension, Fever, unspecified, Gastrointestinal hemorrhage, H/O heart artery stent, History of Clostridioides difficile colitis, History of pulmonary embolus (PE), Hyperlipidemia, Hypertension, Leg swelling, Lymphedema of both lower extremities, MSSA bacteremia, Nausea & vomiting, Obesity, Class III, BMI 40-49.9 (morbid obesity) ([address] Periumbilical abdominal pain, and S/P insertion of inferior vena caval filter. Past Surgical History:  has a past surgical history that includes Cardiac surgery;  section; vascular surgery (N/A, 2021); Upper gastrointestinal endoscopy (N/A, 2021); Dialysis fistula creation (Left, 2022); Colonoscopy (); Cardiac catheterization (); Percutaneous Transluminal Coronary Angio ();  Upper gastrointestinal endoscopy (N/A, 01/18/2023); and IVC filter insertion (01/20/2023). Evaluating Therapist: Carol Davidson PT    Room #:  6246/8784-N  Diagnosis:  Shortness of breath [R06.02]  ESRD (end stage renal disease) on dialysis [facility] [N18.6, Z99.2]  Acute respiratory failure with hypoxia ([facility] [J96.01]  Acute on chronic diastolic CHF (congestive heart failure) ([facility] [I50.33]  Single subsegmental pulmonary embolism without acute cor pulmonale (HCC) [I26.93]  PMHx/PSHx:  CKD, DM, HTn  Precautions:  falls, O2      Social:  Pt lives with daughter in a 1 floor plan 1 small steps  to enter. Prior to admission ambulates with ww, uses home O2 daughter assists as needed. Sleeps in recliner chair     Initial Evaluation  Date: 2/8/23 Treatment      Short Term/ Long Term   Goals   Was pt agreeable to Eval/treatment? yes     Does pt have pain? No c/o pain     Bed Mobility  Rolling: independent  Supine to sit: independent  Sit to supine: NT  Scooting: independent  independent   Transfers Sit to stand: SBA  Stand to sit: SBA  Stand pivot: SBA  independent   Ambulation    50 feet with ww with SBA  50 feet with ww independent   Stair Negotiation  Ascended and descended  NT       LE strength     4-/5    4/5   balance      Good with ww     AM-PAC Raw score               20/24         Pt is alert and Oriented   LE ROM: WFL  Edema: B LE's  Endurance: fair-     ASSESSMENT:    Pt displays functional ability as noted in the objective portion of this evaluation. Patient education  Pt educated on PT objectives    Patient response to education:   Pt verbalized understanding Pt demonstrated skill Pt requires further education in this area   yes           Comments:  SpO2 on 2L after ambulation 85%. Recovered to 90% with seated rest      Pt's/ family goals   1.  To return home    Conditions Requiring Skilled Therapeutic Intervention:    [x]Decreased strength     []Decreased ROM  [x]Decreased functional mobility  [x]Decreased balance   [x]Decreased endurance []Decreased posture  []Decreased sensation  []Decreased coordination   []Decreased vision  []Decreased safety awareness   []Increased pain       Patient and or family understand(s) diagnosis, prognosis, and plan of care. Prognosis is good for reaching above PT goals    PHYSICAL THERAPY PLAN OF CARE:    PT POC is established based on physician order and patient diagnosis     Referring provider/PT Order: Flaquita Mccoy, DO      Current Treatment Recommendations:     [] Strengthening to improve independence with functional mobility   [] ROM to improve independence with functional mobility   [] Balance Training to improve static/dynamic balance and to reduce fall risk  [] Endurance Training to improve activity tolerance during functional mobility   [] Transfer Training to improve safety and independence with all functional transfers   [] Gait Training to improve gait mechanics, endurance and assess need for appropriate assistive device  [] Stair Training in preparation for safe discharge home and/or into the community   [] Positioning to prevent skin breakdown and contractures  [] Safety and Education Training   [] Patient/Caregiver Education   [] HEP  [] Other     PT long term treatment goals are located in above grid    Frequency of treatments: 2-5x/week x 5 days. Time in  1059  Time out  1112    Evaluation Time includes thorough review of current medical information, gathering information on past medical history/social history and prior level of function, completion of standardized testing/informal observation of tasks, assessment of data and education on plan of care and goals.       CPT codes:  [x] Low Complexity PT evaluation 21423  [] Moderate Complexity PT evaluation 15049  [] High Complexity PT evaluation 69337  [] PT Re-evaluation 43680  [] Gait training 53156 minutes  [] Manual therapy 19920 minutes  [] Therapeutic activities 46588 minutes  [] Therapeutic exercises 61032 minutes  [] Neuromuscular reeducation 18766 minutes     Jez Nguyen PT [id number]

## 2023-02-08 NOTE — H&P
History and Physical      CHIEF COMPLAINT: Short of breath    History of Present Illness: 60-year-old female patient of Dr. Erica Davis I am asked to admit and follow. History obtained from patient as well as extensive review electronic record; seen today while in dialysis. Patient with known end-stage renal disease on hemodialysis. Presented to the ED with shortness of breath chest pressure and \"not feeling well. \"  Patient is normally on 2 L nasal cannula oxygen. On 2/5/2023 she had fever with nausea and missed hemodialysis that day. Shortness of breath and cough without much sputum production which progressed. At hemodialysis yesterday she was found to have SPO2 of 80. Nurse practitioner prompted her to come to the ED. Because of her previous history of DVT she underwent ultrasound duplex of lower extremities with following results--    FINDINGS:   The visualized veins of the right and left lower extremities are patent and   free of echogenic thrombus. The veins demonstrate good compressibility with   normal color flow study and spectral analysis. Note that evaluation of the   calf veins in both legs is limited due to patient body habitus. Impression:       No visible DVT in either lower extremity     She also had prior history of pulmonary emboli which occurred 12/3/2022. CT of the chest at that time as follows--    Impression   1. Pulmonary embolus seen within the segmental and subsegmental pulmonary   arteries of the medial basal segment and posterior basal segment of the right   lower lobe. 2. There are no findings of right ventricular strain   3. There is no pulmonary embolus seen within the pulmonary arteries of the   left lung   4. Patchy airspace disease within the right lower lobe which could represent   pneumonia or possibly an early pulmonary infarct   5. Linear atelectasis seen within the left lung base   6. Small amount of pleural fluid seen within the right major fissure.      At the time of that admission she was placed on apixaban 5 mg twice daily. She presented to the ED on 1/17/2023 with complaints of hematemesis. She underwent EGD by GI service on 1/18/2023 with following findings--    The scope was then advanced through the gastroesophageal junction into  the gastric body, along the greater curvature. Evaluation of the body  of the stomach showed minimal gastritis. Biopsies were taken from this  area to rule out Helicobacter pylori infection. The scope was then advanced through the pylorus into the duodenal bulb  and second portion of the duodenum and evaluation showed severe  duodenitis with edema and superficial ulceration. Biopsies were done to  rule out sprue. The scope was then retrieved and retroflexed in the  prepyloric antrum, with thorough evaluation of the cardiac and fundal  portions of the stomach, which appeared to be within normal limits. 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 Due to the above findings the apixaban was discontinued. She then underwent placement of IVC filter on 1/20/2023. --She underwent CTA of lungs in the ED yesterday with following results--    Impression   1. Limited examination due to motion and suboptimal timing of contrast.  No   large central or proximal segmental pulmonary arterial embolism. Pulmonary   embolism seen on prior is not definitively visualized. Repeat CTA may be   helpful for further evaluation. 2. Increased opacities in right lower lobe suggesting atelectasis and   pneumonia. 3. New right pleural effusion. 4. Pulmonary vascular congestion and findings which could indicate right   heart failure. --In the ED BUN of 15 creatinine 3.5. Glucose 181. proBNP 52,305; had been 30,524 on 1/31/2023. She underwent 2D echo on 12/3/2022 with following results--     Summary   Left ventricle grossly normal in size.    Mild left ventricular concentric hypertrophy noted. Normal LV segmental wall motion. Estimated left ventricular ejection fraction is 58±5%. Does not meet 50% diagnostic criteria for diastolic dysfunction. The LAESV Index is <34ml/m2. Borderline dilated right ventricle. TAPSE is normal   Mild aortic stenosis is present. Aortic valve dimensionless valve index . 41   Physiologic and/or trace tricuspid regurgitation. RVSP is 39 mmHg. Technically fair quality study. Technically difficult study due to patient''s body habitus. No comparison study available. Suggest clinical correlation. -- In the ED hemoglobin 9.2 WC 9.9. Molecular/viral respiratory panel all not detected. --Fasting glucose today 152; remainder of labs pending.  -- Patient developed diabetes approximately age 49. She had been on metformin never on insulin until 2022. At the time of the 2022 admission she was taking nothing for her diabetes, simply watching her diet. --Smoking approximately  quit  1 pack daily 15 years  --Mother  age 62 acute MI, father  age 62 CVA  --Coronary artery disease with PTCA PCI done while living in [place],   --Hemodialysis she states started 2021; [facility] states 2021  --Denies any rheumatic fever scarlet fever polio diphtheria cancer  --Seen by pulmonary in consult today; continue hemodialysis, repeat chest x-ray tomorrow and see if there is any need for thoracentesis. No lung medications needed. -- GI consult from today appreciated. Continue Protonix twice daily, stool for occult blood. EGD to be scheduled for 2/10/2023, orders have been placed. --Physical therapy AMPAC score from today . Occupational Therapy score . --Nephrology consult from today additional dialysis treatment today evaluate for additional treatments on a daily basis. Otherwise continue normal  schedule.       Past Medical History:   Diagnosis Date    Acute pulmonary embolism ([address] 12/03/2022    Anemia in CKD (chronic kidney disease) 11/30/2021    Anxiety and depression 01/19/2022    At high risk for falls 01/19/2022    Brain aneurysm     CLABSI (central line-associated bloodstream infection) 11/19/2021    Cough 01/19/2022    Diabetes mellitus ([address] 2006    Diabetes mellitus type 2 with complications ([address] 70/31/6202    Dizziness on standing 01/19/2022    End-stage renal disease on hemodialysis ([address] 01/19/2023    ESRD (end stage renal disease) ([address] 11/19/2021    ESRD on hemodialysis ([address] 11/19/2021    Essential hypertension 11/30/2021    Fever, unspecified     Gastrointestinal hemorrhage 11/30/2021    H/O heart artery stent 2015    Done in [place]    History of Clostridioides difficile colitis 01/19/2022    Hyperlipidemia     Hypertension     Leg swelling 01/19/2023    Lymphedema of both lower extremities 01/19/2023    MSSA bacteremia 11/18/2021    Nausea & vomiting 11/18/2021    Obesity, Class III, BMI 40-49.9 (morbid obesity) ([address] 88/88/3011    Periumbilical abdominal pain     S/P insertion of inferior vena caval filter 01/20/2023         Past Surgical History:   Procedure Laterality Date    CARDIAC CATHETERIZATION  2015    Done in [address] [place] [address]  2017    Negative findings    DIALYSIS FISTULA CREATION Left 01/28/2022    REVISION AV FISTULA LEFT ARM performed by Allison Sandhu MD at [address] [place] [address]    PTCA  2015    PTCA [address] [place] [address] N/A 12/01/2021    EGD BIOPSY performed by Caryn Rodrigues MD at [address] [place] [address] N/A 1/18/2023    EGD BIOPSY performed by Caryn Rodrigues MD at [address] [place] [address] N/A 11/24/2021    CATHETER INSERTION  TUNNELED HEMODIALYSIS, WITH REMOVAL OF TEMPORARY CATHETER performed by Allison Sandhu MD at [facility] OR       Medications Prior to Admission:    Medications Prior to Admission: insulin glargine (LANTUS) 100 UNIT/ML injection vial, Inject 20 Units into the skin nightly  insulin lispro (HUMALOG) 100 UNIT/ML SOLN injection vial, Inject 0-8 Units into the skin 3 times daily (with meals)  insulin lispro (HUMALOG) 100 UNIT/ML SOLN injection vial, Inject 0-4 Units into the skin nightly  insulin lispro (HUMALOG) 100 UNIT/ML SOLN injection vial, Inject 4 Units into the skin 3 times daily (with meals)  Insulin Pen Needle ([employer] PEN NEEDLES 29G) 29G X 12MM MISC, 1 each by Does not apply route daily  Blood Glucose Monitoring Suppl (BLOOD GLUCOSE MONITOR SYSTEM) w/Device KIT, by Does not apply route (Patient not taking: Reported on 1/30/2023)  glucose 4 g chewable tablet, Take 4 tablets by mouth as needed for Low blood sugar  pantoprazole (PROTONIX) 40 MG tablet, Take 1 tablet by mouth 2 times daily (before meals)  glucose monitoring (FREESTYLE FREEDOM) kit, 1 kit by Does not apply route daily (Patient not taking: Reported on 1/30/2023)  Lancets 30G MISC, 1 each by Does not apply route daily (Patient not taking: Reported on 1/30/2023)  blood glucose monitor strips, Test 3 times a day. Dispense sufficient amount for indicated testing frequency plus additional to accommodate PRN testing needs. rosuvastatin (CRESTOR) 5 MG tablet, Take 1 tablet by mouth daily  acetaminophen (TYLENOL) 650 MG extended release tablet, Take 1,300 mg by mouth every 8 hours as needed for Pain  midodrine (PROAMATINE) 10 MG tablet, Take 10 mg by mouth daily as needed (AT DIALYSIS FOR LOW BP)  calcium acetate (PHOSLO) 667 MG CAPS capsule, Take 667 mg by mouth 3 times daily (with meals)    Allergies:    Pcn [penicillins], Benzonatate, Morphine, and Sodium hypochlorite    Social History:    reports that she quit smoking about 37 years ago. Her smoking use included cigarettes. She started smoking about 49 years ago. She has a 12.00 pack-year smoking history.  She has never used smokeless tobacco. She reports that she does not drink alcohol and does not use drugs. Family History:   family history includes Coronary Art Dis in her brother, father, and mother. REVIEW OF SYSTEMS:  As above in the HPI, otherwise negative    PHYSICAL EXAM:    VS: BP (!) 155/70   Pulse 78   Temp 97.9 °F (36.6 °C) (Oral)   Resp 20   Ht 5' 4\" (1.626 m)   Wt 258 lb (117 kg)   SpO2 96%   BMI 44.29 kg/m²     General appearance: Alert, Awake, Oriented times 3, no distress, morbidly obese, nasal cannula oxygen in place  Skin: Warm and dry ; no rashes  Head: Normocephalic. No masses, lesions or tenderness noted  Eyes: Conjunctivae pale, sclera white. PERRL,EOM-I  Ears: External ears normal  Nose/Sinuses: Nares normal. Septum midline. Mucosa normal. No drainage  Oropharynx: Oropharynx clear with no exudate seen  Neck: Supple. No jugular venous distension, lymphadenopathy or thyromegaly Trachea midline  Lungs: Decreased breath sounds at bases  Heart: S1 S2  Regular rate and rhythm. No rub, gallop; grade 1/6 systolic murmur second right intercostal space  Abdomen: Soft, non-tender. BS normal. No masses, organomegaly; no rebound or guarding  Extremities: 2-3+ bilateral edema  Musculoskeletal: Muscular strength appears intact. Neuro:  No focal motor defects ; II-XII grossly intact .  MORRIS equally  Breast: deferred  Rectal: deferred  Genitalia:  deferred    LABS:  CBC:   Lab Results   Component Value Date/Time    WBC 9.9 02/07/2023 11:56 AM    RBC 2.96 02/07/2023 11:56 AM    HGB 9.2 02/07/2023 11:56 AM    HCT 28.6 02/07/2023 11:56 AM    MCV 96.6 02/07/2023 11:56 AM    MCH 31.1 02/07/2023 11:56 AM    MCHC 32.2 02/07/2023 11:56 AM    RDW 15.0 02/07/2023 11:56 AM     02/07/2023 11:56 AM    MPV 11.0 02/07/2023 11:56 AM     CBC with Differential:    Lab Results   Component Value Date/Time    WBC 9.9 02/07/2023 11:56 AM    RBC 2.96 02/07/2023 11:56 AM    HGB 9.2 02/07/2023 11:56 AM    HCT 28.6 02/07/2023 11:56 AM     02/07/2023 11:56 AM    MCV 96.6 02/07/2023 11:56 AM    MCH 31.1 02/07/2023 11:56 AM    MCHC 32.2 02/07/2023 11:56 AM    RDW 15.0 02/07/2023 11:56 AM    NRBC 0.0 01/13/2022 04:38 AM    LYMPHOPCT 9.0 02/07/2023 11:56 AM    MONOPCT 6.6 02/07/2023 11:56 AM    BASOPCT 0.5 02/07/2023 11:56 AM    MONOSABS 0.65 02/07/2023 11:56 AM    LYMPHSABS 0.89 02/07/2023 11:56 AM    EOSABS 0.31 02/07/2023 11:56 AM    BASOSABS 0.05 02/07/2023 11:56 AM     Hemoglobin/Hematocrit:    Lab Results   Component Value Date/Time    HGB 9.2 02/07/2023 11:56 AM    HCT 28.6 02/07/2023 11:56 AM     CMP:    Lab Results   Component Value Date/Time     02/07/2023 11:56 AM    K 4.5 02/07/2023 11:56 AM    K 4.2 08/23/2022 12:12 PM    CL 94 02/07/2023 11:56 AM    CO2 25 02/07/2023 11:56 AM    BUN 15 02/07/2023 11:56 AM    CREATININE 3.5 02/07/2023 11:56 AM    GFRAA 8 09/22/2022 12:14 PM    LABGLOM 14 02/07/2023 11:56 AM    GLUCOSE 181 02/07/2023 11:56 AM    PROT 6.2 02/01/2023 04:00 AM    LABALBU 3.4 02/01/2023 04:00 AM    CALCIUM 8.3 02/07/2023 11:56 AM    BILITOT 0.4 02/01/2023 04:00 AM    ALKPHOS 88 02/01/2023 04:00 AM    AST 13 02/01/2023 04:00 AM    ALT 12 02/01/2023 04:00 AM     BMP:    Lab Results   Component Value Date/Time     02/07/2023 11:56 AM    K 4.5 02/07/2023 11:56 AM    K 4.2 08/23/2022 12:12 PM    CL 94 02/07/2023 11:56 AM    CO2 25 02/07/2023 11:56 AM    BUN 15 02/07/2023 11:56 AM    LABALBU 3.4 02/01/2023 04:00 AM    CREATININE 3.5 02/07/2023 11:56 AM    CALCIUM 8.3 02/07/2023 11:56 AM    GFRAA 8 09/22/2022 12:14 PM    LABGLOM 14 02/07/2023 11:56 AM    GLUCOSE 181 02/07/2023 11:56 AM     Hepatic Function Panel:    Lab Results   Component Value Date/Time    ALKPHOS 88 02/01/2023 04:00 AM    ALT 12 02/01/2023 04:00 AM    AST 13 02/01/2023 04:00 AM    PROT 6.2 02/01/2023 04:00 AM    BILITOT 0.4 02/01/2023 04:00 AM    BILIDIR <0.2 02/01/2023 04:00 AM    IBILI see below 02/01/2023 04:00 AM    LABALBU 3.4 02/01/2023 04:00 AM     Ionized Calcium:  No results found for: IONCA  Magnesium:    Lab Results   Component Value Date/Time    MG 1.9 02/07/2023 12:55 PM     Phosphorus:    Lab Results   Component Value Date/Time    PHOS 3.8 02/01/2023 04:00 AM     LDH:    Lab Results   Component Value Date/Time     01/11/2022 12:45 PM     Uric Acid:    Lab Results   Component Value Date/Time    LABURIC 7.3 01/31/2023 06:36 AM     PT/INR:    Lab Results   Component Value Date/Time    PROTIME 12.0 01/31/2023 06:36 AM    INR 1.1 01/31/2023 06:36 AM     Warfarin PT/INR:  No components found for: PTPATWAR, PTINRWAR  PTT:    Lab Results   Component Value Date/Time    APTT 34.5 01/17/2023 08:35 AM   [APTT}  Troponin:  No results found for: TROPONINI  Last 3 Troponin:  No results found for: TROPONINI  U/A:    Lab Results   Component Value Date/Time    COLORU Yellow 11/18/2021 03:19 AM    PROTEINU >=300 11/18/2021 03:19 AM    PHUR 6.0 11/18/2021 03:19 AM    WBCUA 1-3 11/18/2021 03:19 AM    RBCUA 0-1 11/18/2021 03:19 AM    BACTERIA FEW 11/18/2021 03:19 AM    CLARITYU Clear 11/18/2021 03:19 AM    SPECGRAV 1.020 11/18/2021 03:19 AM    LEUKOCYTESUR Negative 11/18/2021 03:19 AM    UROBILINOGEN 0.2 11/18/2021 03:19 AM    BILIRUBINUR Negative 11/18/2021 03:19 AM    BLOODU SMALL 11/18/2021 03:19 AM    GLUCOSEU 500 11/18/2021 03:19 AM     ABG:  No results found for: PH, PCO2, PO2, HCO3, BE, THGB, TCO2, O2SAT  HgBA1c:    Lab Results   Component Value Date/Time    LABA1C 11.0 01/31/2023 06:35 AM     FLP:    Lab Results   Component Value Date/Time    TRIG 143 01/31/2023 06:36 AM    HDL 43 01/31/2023 06:36 AM    LDLCALC 100 01/31/2023 06:36 AM    LABVLDL 29 01/31/2023 06:36 AM     TSH:    Lab Results   Component Value Date/Time    TSH 1.720 01/31/2023 06:36 AM     VITAMIN B12: No components found for: B12  FOLATE:    Lab Results   Component Value Date/Time    FOLATE 9.3 01/31/2023 06:35 AM     IRON:    Lab Results   Component Value Date/Time    IRON 45 01/31/2023 06:35 AM     Iron Saturation:  No components found for: PERCENTFE  TIBC:    Lab Results   Component Value Date/Time    TIBC 173 01/31/2023 06:35 AM     FERRITIN:    Lab Results   Component Value Date/Time    FERRITIN 1,357 01/31/2023 06:35 AM       RADIOLOGY:  CTA PULMONARY W CONTRAST   Final Result   1. Limited examination due to motion and suboptimal timing of contrast.  No   large central or proximal segmental pulmonary arterial embolism.  Pulmonary   embolism seen on prior is not definitively visualized.  Repeat CTA may be   helpful for further evaluation.   2. Increased opacities in right lower lobe suggesting atelectasis and   pneumonia.   3. New right pleural effusion.   4. Pulmonary vascular congestion and findings which could indicate right   heart failure.         US DUP LOWER EXTREMITIES BILATERAL VENOUS   Final Result   No visible DVT in either lower extremity.         XR CHEST PORTABLE   Final Result   1. Cardiomegaly.  There are no findings of CHF   2. Possible bibasilar airspace disease and or pleural effusions.         XR CHEST PORTABLE    (Results Pending)       ASSESSMENT:      Active Hospital Problems    Diagnosis     Acute on chronic diastolic CHF (congestive heart failure) ([facility]) [I50.33]      Priority: Medium       PLAN:  Medications discussed with patient  GI prophylaxis  DVT prophylaxis  Consultants notes reviewed  Cardiology's been consulted regarding the heart failure preserved ejection fraction  Nephrology consulted for hemodialysis  PhosLo 667 1 capsule 3 times daily  Heparin 5000 units subcu every 8 hours  Lantus 20 units nightly  Low-dose sliding scale insulin  Humalog 4 units 3 times daily with meals  Rosuvastatin 5 mg daily  Consult GI for EGD  EGD scheduled for 2/10/2023  She has appointment to follow-up with endocrinology on 2/13/2023        Please note that over 50 minutes was spent .  Greater than 51% includes interviewing patient and reviewing chart; performing medical examination; ordering medications, tests and/or  procedures; formulating assessment plan, reviewing rationale for the above recommendations; reviewing available records, results of all previously ordered tests and procedures and current problem list; counseling/educating the patient; coordinating care with other healthcare professionals; communicating results to the patient's family/caregiver; documenting clinical information in the electronic record      Jennifer Mae DO    9:14 AM  2/8/2023    Voice recognition software use for dictation

## 2023-02-08 NOTE — CONSULTS
Gastroenterology Consult Note   Shiloh BOWLING NP-C with Syeda Álvarez M.D. Consult Note        Date of Service: 2/8/2023  Reason for Consult: endoscopy tomorrow  Requesting Physician: Sedrick Jones MD    CHIEF COMPLAINT:  shortness of breath    History Obtained From:  patient, electronic medical record    HISTORY OF PRESENT ILLNESS:       Lizette Murray is a 67 y.o. female with significant past medical history of  PE, anemia, CKD, anxiety, depression, brain aneurysm, CLABSI, DM, ESRD on hemo, HTN, GI hemorrhage, HLD, HTN, N/V, obesity admitted via ED for shortness of breath.  Pt reports to shortness of breath beginning this last Monday. Having trouble completing her ADLs. States she turned up her O2 at home from 2-4L, for the complaints. States this seemed to help her at that time. Went to dialysis yesterday, completed treatment. While at dialysis her O2 sat was around 79-80% on 2L- instructed to come to the ER for evaluation. States she was inpatient for 3 days last week for rectal bleeding. States the rectal bleeding stopped at that time, and no findings came from the hospitalization. GI wasn't consulted for that admission.  Reports General Surgery seen her while she was inpatient. Following that discharge patient states she got the flu for a day and a half at home. Was having N/V. Describes the emesis as \"food\". Denies any blood/black appearance. N/V has since resolved. With constipation recently. Bowels are moving every other day described as \"soft brown\". Denies any blood/black. Denies any N/V, or abdominal pain at this time.  Last colon was in 2017 in [place]; normal at that time. Scheduled for a repeat colon in July with Dr. Álvarez.  Denies any FMHx of colon cancer.  EGD with Dr. Álvarez 1/18/23: Grade B LA classification GERD and gastritis, minimal, biopsied to rule out H. pylori infection.  Duodenum showed severe duodenitis with superficial ulceration.  Biopsies were done.EGD with Dr. Álvarez 12/1/21: Grade  B LA classification GERD. Gastritis, biopsied to rule out H. pylori infection. Duodenum showed scalloping suspicious for celiac disease; biopsies were done. Admission labs chloride 94, creat 3.5, anion gap 18, , calcium 8.3, BNP 48872, RBC 2.96, H&H 9.2 & 28.6. US BLE:  No visible DVT in either lower extremity. Pulmonary CTA: 1. Limited examination due to motion and suboptimal timing of contrast.  No large central or proximal segmental pulmonary arterial embolism. Pulmonary embolism seen on prior is not definitively visualized. Repeat CTA may be helpful for further evaluation. 2. Increased opacities in right lower lobe suggesting atelectasis and pneumonia. 3. New right pleural effusion. 4. Pulmonary vascular congestion and findings which could indicate right heart failure. CXR: 1. Cardiomegaly. There are no findings of CHF 2. Possible bibasilar airspace disease and or pleural effusions. Consultation for endoscopy tomorrow. Pt is  known to Dr. Yoselyn Rankin, last seen with prior hospitalization earlier last month. Currently, pt reports to feeling better since admission. Labs today chloride 96, creat 5.1, , calcium 8.4, BNP 53314, albumin 3.4, ALT 50.     Past Medical History:        Diagnosis Date    Acute on chronic heart failure with preserved ejection fraction ([address] 2/7/2023    Acute pulmonary embolism ([address] 12/03/2022    Anemia due to stage 5 chronic kidney disease, not on chronic dialysis ([address] 2/8/2023    Anemia in CKD (chronic kidney disease) 11/30/2021    Anxiety and depression 01/19/2022    Aortic stenosis, mild 2/8/2023 12/2022    At high risk for falls 01/19/2022    Brain aneurysm     CLABSI (central line-associated bloodstream infection) 11/19/2021    Cough 01/19/2022    Diabetes mellitus ([address] 2006    Diabetes mellitus type 2 with complications ([address] 08/37/5801    Dizziness on standing 01/19/2022    End-stage renal disease on hemodialysis ([address] 01/19/2023    ESRD (end stage renal disease) ([facility] 11/19/2021    ESRD on hemodialysis ([facility] 11/19/2021    Essential hypertension 11/30/2021    Fever, unspecified     Gastrointestinal hemorrhage 11/30/2021    H/O heart artery stent 2015    Done in [place]    History of Clostridioides difficile colitis 01/19/2022    History of pulmonary embolus (PE) 2/8/2023 12/2022    Hyperlipidemia     Hypertension     Leg swelling 01/19/2023    Lymphedema of both lower extremities 01/19/2023    MSSA bacteremia 11/18/2021    Nausea & vomiting 11/18/2021    Obesity, Class III, BMI 40-49.9 (morbid obesity) ([facility] 97/37/4914    Periumbilical abdominal pain     S/P insertion of inferior vena caval filter 01/20/2023     Past Surgical History:        Procedure Laterality Date    CARDIAC CATHETERIZATION  2015    Done in [facility] [address]      COLONOSCOPY  2017    Negative findings    DIALYSIS FISTULA CREATION Left 01/28/2022    REVISION AV FISTULA LEFT ARM performed by Wen Berkowitz MD at [address] [place] [address]    IVC FILTER INSERTION  01/20/2023    DVT and pulmonary emboli, bleeding duodenal ulcer    PTCA  2015    PTCA [facility],[place]    UPPER GASTROINTESTINAL ENDOSCOPY N/A 12/01/2021    EGD BIOPSY performed by Marci Campos MD at [address] [place] [address] Dr Edgar 01/18/2023    EGD BIOPSY performed by Marci Campos MD at [facility] N/A 11/24/2021    CATHETER INSERTION  TUNNELED HEMODIALYSIS, WITH REMOVAL OF TEMPORARY CATHETER performed by Wen Berkowitz MD at [facility] OR     Current Medications:    Current Facility-Administered Medications: acetaminophen (TYLENOL) tablet 1,300 mg, 1,300 mg, Oral, Q8H PRN  calcium acetate (PHOSLO) capsule 667 mg, 667 mg, Oral, TID WC  glucose chewable tablet 16 g, 4 tablet, Oral, PRN  insulin glargine (LANTUS) injection vial 20 Units, 20 Units, SubCUTAneous, Nightly  insulin lispro (HUMALOG) injection vial 4 Units, 4 Units, SubCUTAneous, TID WC  midodrine (PROAMATINE) tablet 10 mg, 10 mg, Oral, Daily PRN  pantoprazole (PROTONIX) tablet 40 mg, 40 mg, Oral, BID AC  rosuvastatin (CRESTOR) tablet 5 mg, 5 mg, Oral, Daily  pantoprazole (PROTONIX) tablet 40 mg, 40 mg, Oral, QAM AC  glucose chewable tablet 16 g, 4 tablet, Oral, PRN  dextrose bolus 10% 125 mL, 125 mL, IntraVENous, PRN **OR** dextrose bolus 10% 250 mL, 250 mL, IntraVENous, PRN  glucagon (rDNA) injection 1 mg, 1 mg, IntraMUSCular, PRN  dextrose 10 % infusion, , IntraVENous, Continuous PRN  insulin lispro (HUMALOG) injection vial 0-4 Units, 0-4 Units, SubCUTAneous, TID WC  insulin lispro (HUMALOG) injection vial 0-4 Units, 0-4 Units, SubCUTAneous, Nightly  heparin (porcine) injection 5,000 Units, 5,000 Units, SubCUTAneous, Q8H  acetaminophen (TYLENOL) tablet 650 mg, 650 mg, Oral, Q4H PRN    Allergies:  Pcn [penicillins], Benzonatate, Morphine, and Sodium hypochlorite    Social History:    Tobacco:  Pt quit in ; 1/2 PPD for 10 years  Alcohol:  Denies  Illicit Drugs: Pt denies    Family History:   Family History   Problem Relation Age of Onset    Coronary Art Dis Mother          age 62 acute MI    Coronary Art Dis Father          age 62 CVA    Coronary Art Dis Brother          REVIEW OF SYSTEMS:    Aside from what was mentioned in the PMH and HPI, essentially unremarkable, all others negative. PHYSICAL EXAM:      Vitals:    BP (!) 155/70   Pulse 78   Temp 97.9 °F (36.6 °C) (Oral)   Resp 20   Ht 5' 4\" (1.626 m)   Wt 258 lb (117 kg)   SpO2 96%   BMI 44.29 kg/m²       CONSTITUTIONAL:  awake, alert, cooperative, no apparent distress, and appears older than stated age, obese  EYES:  pupils equal, round and reactive to light, sclera anicteric and conjunctiva normal  ENT:  normocephalic, oral pharynx with moist mucous membranes  LUNGS: clear to auscultation bilaterally.  O2 at 3L  CARDIOVASCULAR:  regular rate and rhythm, no murmur noted; 2+ pulses; trace edema  ABDOMEN:   normal bowel sounds, soft, non-distended, non-tender, no masses palpated, large, pendulous   NEUROLOGIC:  Mental Status Exam:  Level of Alertness:   awake  Orientation:   person, place, time  SKIN:  pale skin color, texture, turgor    DATA:    CBC with Differential:    Lab Results   Component Value Date/Time    WBC 9.9 02/07/2023 11:56 AM    RBC 2.96 02/07/2023 11:56 AM    HGB 9.2 02/07/2023 11:56 AM    HCT 28.6 02/07/2023 11:56 AM     02/07/2023 11:56 AM    MCV 96.6 02/07/2023 11:56 AM    MCH 31.1 02/07/2023 11:56 AM    MCHC 32.2 02/07/2023 11:56 AM    RDW 15.0 02/07/2023 11:56 AM    NRBC 0.0 01/13/2022 04:38 AM    LYMPHOPCT 9.0 02/07/2023 11:56 AM    MONOPCT 6.6 02/07/2023 11:56 AM    BASOPCT 0.5 02/07/2023 11:56 AM    ATYLYMREL 0.9 01/13/2022 04:38 AM    MONOSABS 0.65 02/07/2023 11:56 AM    LYMPHSABS 0.89 02/07/2023 11:56 AM    EOSABS 0.31 02/07/2023 11:56 AM    BASOSABS 0.05 02/07/2023 11:56 AM     CMP:    Lab Results   Component Value Date/Time     02/08/2023 08:50 AM    K 4.2 02/08/2023 08:50 AM    K 4.2 08/23/2022 12:12 PM    CL 96 02/08/2023 08:50 AM    CO2 26 02/08/2023 08:50 AM    BUN 23 02/08/2023 08:50 AM    CREATININE 5.1 02/08/2023 08:50 AM    GFRAA 8 09/22/2022 12:14 PM    LABGLOM 9 02/08/2023 08:50 AM    GLUCOSE 191 02/08/2023 08:50 AM    PROT 6.8 02/08/2023 08:50 AM    LABALBU 3.4 02/08/2023 08:50 AM    CALCIUM 8.4 02/08/2023 08:50 AM    BILITOT 0.4 02/08/2023 08:50 AM    ALKPHOS 89 02/08/2023 08:50 AM    AST 16 02/08/2023 08:50 AM    ALT 50 02/08/2023 08:50 AM     Hepatic Function Panel:    Lab Results   Component Value Date/Time    ALKPHOS 89 02/08/2023 08:50 AM    ALT 50 02/08/2023 08:50 AM    AST 16 02/08/2023 08:50 AM    PROT 6.8 02/08/2023 08:50 AM    BILITOT 0.4 02/08/2023 08:50 AM    BILIDIR <0.2 02/08/2023 08:50 AM    IBILI see below 02/08/2023 08:50 AM    LABALBU 3.4 02/08/2023 08:50 AM     PT/INR:    Lab Results   Component Value Date/Time    PROTIME 12.0 01/31/2023 06:36 AM    INR 1.1 01/31/2023 06:36 AM     PTT:    Lab Results   Component Value Date/Time    APTT 34.5 01/17/2023 08:35 AM   [APTT}  Last 3 Troponin:  No results found for: TROPONINI  TSH:    Lab Results   Component Value Date/Time    TSH 2.970 02/08/2023 08:50 AM     VITAMIN B12:   Lab Results   Component Value Date/Time    VDDORORV68 451 01/31/2023 06:35 AM     FOLATE:    Lab Results   Component Value Date/Time    FOLATE 9.3 01/31/2023 06:35 AM     IRON:    Lab Results   Component Value Date/Time    IRON 45 01/31/2023 06:35 AM     Iron Saturation:    Lab Results   Component Value Date/Time    LABIRON 26 01/31/2023 06:35 AM     TIBC:    Lab Results   Component Value Date/Time    TIBC 173 01/31/2023 06:35 AM     FERRITIN:    Lab Results   Component Value Date/Time    FERRITIN 1,357 01/31/2023 06:35 AM         Lab Results   Component Value Date    TRIG 148 02/08/2023    TRIG 143 01/31/2023    TRIG 173 (H) 01/18/2023       Lab Results   Component Value Date    HDL 43 02/08/2023    HDL 43 01/31/2023    HDL 41 01/18/2023       Lab Results   Component Value Date    LDLCALC 78 02/08/2023    LDLCALC 100 (H) 01/31/2023    LDLCALC 79 01/18/2023       Lab Results   Component Value Date    LABVLDL 30 02/08/2023    LABVLDL 29 01/31/2023    LABVLDL 35 01/18/2023        XR CHEST PORTABLE    Result Date: 2/7/2023  EXAMINATION: ONE XRAY VIEW OF THE CHEST 2/7/2023 11:13 am COMPARISON: None. HISTORY: ORDERING SYSTEM PROVIDED HISTORY: SOB hypoxia TECHNOLOGIST PROVIDED HISTORY: Reason for exam:->SOB hypoxia FINDINGS: The chest x-ray is limited due to the patient's body habitus The heart is enlarged. I cannot exclude bibasilar airspace disease and/or pleural effusions. The upper lobes are clear. There is no pneumothorax     1. Cardiomegaly. There are no findings of CHF 2. Possible bibasilar airspace disease and or pleural effusions.      CTA PULMONARY W CONTRAST    Result Date: 2/7/2023  EXAMINATION: CTA OF THE CHEST 2/7/2023 2:27 pm TECHNIQUE: CTA of the chest was performed after the administration of intravenous contrast.  Multiplanar reformatted images are provided for review.  MIP images are provided for review. Automated exposure control, iterative reconstruction, and/or weight based adjustment of the mA/kV was utilized to reduce the radiation dose to as low as reasonably achievable. COMPARISON: December 3, 2022 HISTORY: ORDERING SYSTEM PROVIDED HISTORY: r/o PE TECHNOLOGIST PROVIDED HISTORY: Reason for exam:->r/o PE Decision Support Exception - unselect if not a suspected or confirmed emergency medical condition->Emergency Medical Condition (MA) FINDINGS: Limited examination due to motion and suboptimal timing of contrast.  Distal segmental and subsegmental pulmonary arteries are not optimally assessed.  No large central or proximal segmental pulmonary arterial embolism.  There are increased opacities in right lower lobe.  There is a moderate right pleural effusion.  There is prominence of the pulmonary vasculature.  No pneumothorax.  There is moderate cardiomegaly.  No pericardial effusion. Atherosclerotic calcifications are associated with the coronary arteries. Few prominent lymph nodes are seen in right hilar location.  There are multiple prominent mediastinal lymph nodes appearing similar in size and number notable in pretracheal location measuring up to 1.7 x 1.2 cm.  View of the upper abdomen shows reflux of contrast from right atrium into the hepatic veins.     1. Limited examination due to motion and suboptimal timing of contrast.  No large central or proximal segmental pulmonary arterial embolism.  Pulmonary embolism seen on prior is not definitively visualized.  Repeat CTA may be helpful for further evaluation. 2. Increased opacities in right lower lobe suggesting atelectasis and pneumonia. 3. New right pleural effusion. 4. Pulmonary vascular congestion and findings which could indicate right heart failure.     US DUP LOWER EXTREMITIES BILATERAL  VENOUS    Result Date: 2/7/2023  EXAMINATION: DUPLEX VENOUS ULTRASOUND OF THE BILATERAL LOWER EXTREMITIES2/7/2023 11:05 am TECHNIQUE: Duplex ultrasound using B-mode/gray scaled imaging, Doppler spectral analysis and color flow Doppler was obtained of the deep venous structures of the lower bilateral extremities. COMPARISON: None. HISTORY: ORDERING SYSTEM PROVIDED HISTORY: DVT r/o, hx of PE TECHNOLOGIST PROVIDED HISTORY: Reason for exam:->DVT r/o, hx of PE What reading provider will be dictating this exam?->CRC FINDINGS: The visualized veins of the right and left lower extremities are patent and free of echogenic thrombus. The veins demonstrate good compressibility with normal color flow study and spectral analysis. Note that evaluation of the calf veins in both legs is limited due to patient body habitus. No visible DVT in either lower extremity. IMPRESSION:    SOB  Anemia, normocytic  ESRD on hemodialysis   PE- was on Eliquis- on hold recently d/t GIB  DM  EGD 1/18/23-Grade B LA classification GERD and gastritis, minimal, H. pylori negative. Duodenum showed severe duodenitis with superficial ulceration. Biopsies were done, pending. S/p IVC filter 1/20/23  Pleural effusions  R HF  Pneumonia     RECOMMENDATIONS:      Patient was scheduled for repeat EGD today as OP, to assess duodenitis & ulcerations; prior to restart of [address]. Cancelled for now  Occult stool today; document in progress notes  Continue Protonix BID dosing  Diet as tolerated for now  Pulmonary & Cardiology following patient  H&H now  EGD on Friday with Dr. Mae Josue. EGD orders have been placed. Supportive care  Monitor stools  Medical management per Primary, including pain management   Trend labs  Will follow    Thank you very much for your consultation. We will follow closely with you.     Discussed with Dr. Jordi Hannah developed by Dr. Sunil Ospina, NP-C 2/8/2023 10:00 AM for Dr. Bethany Noble EXAMINED. I HAD A FACE TO FACE ENCOUNTER WITH THE PATIENT. AGREE WITH THE EXAM, ASSESSMENT, AND PLAN AS OUTLINED ABOVE. ADDITION AND CORRECTIONS WERE DONE AS DEEMED APPROPRIATE. MY EXAM AND PLAN INCLUDE: PATIENT SEEN, WAS SCHEDULED FOR EGD FOLLOW UP PRIOR TO RESUMING ANTICOAGULATION. NOW WITH CHF, WILL PLAN FOR EGD ON Friday, IF OK WITH CARDIOLOGY. DISCUSSED WITH PATIENT & FAMILY.

## 2023-02-08 NOTE — CARE COORDINATION
Met w/ patient. Explained role of  and plan of care. Lives w/ her daughter Yesenia Sullivan in a 1 story house- 1 step to entrance. Has WC, [employer], grab bars in bath. Does not drive- daughter provides needed transportation. Requiring O2 2lNC which is pt's home O2 baseline provided by [employer]. Receives hemodialysis @ [facility] T--Sat-chair time 5:30 am- notify when pt is discharging [phone]. Per pt, plan is to return home on discharge- requesting HHC/PT on discharge- no HHC preference- referral made to [address] [place] [address]- awaiting acceptance- [facility] orders. PCP is Dr. Glendora Saint and pharmacy is [address] [place] [address]. States daughter will provide transportation home. Will follow See Willoughby RN case manager    The Plan for Transition of Care is related to the following treatment goals: physical conditioning    The Patient and/or patient representative Edith Landin was provided with a choice of provider and agrees   with the discharge plan. [x] Yes [] No    Freedom of choice list was provided with basic dialogue that supports the patient's individualized plan of care/goals, treatment preferences and shares the quality data associated with the providers. [x] Yes [] No    1355:  Accepted by [facility]- next start of care 2/11-   [facility] orders on chart  See Willoughby RN case manager

## 2023-02-08 NOTE — FLOWSHEET NOTE
Pt completed 3 hrs of HD on a 2K bath with 3L of UF removed safely. 02/08/23 1640   Vital Signs   BP (!) 116/50   Temp 98 °F (36.7 °C)   Heart Rate 63   Resp 18   Weight 251 lb 5.2 oz (114 kg)   Weight Method Bed scale   Percent Weight Change -2.56   Pain Assessment   Pain Assessment None - Denies Pain   Post-Hemodialysis Assessment   Post-Treatment Procedures Blood returned; Access bleeding time < 10 minutes   Machine Disinfection Process Acid/Vinegar Clean;Heat Disinfect; Exterior Machine Disinfection   Rinseback Volume (ml) 300 ml   Blood Volume Processed (Liters) 66.1 l/min   Dialyzer Clearance Moderately streaked   Duration of Treatment (minutes) 180 minutes   Hemodialysis Intake (ml) 300 ml   Hemodialysis Output (ml) 3300 ml   NET Removed (ml) 3000   Tolerated Treatment Good   Patient Response to Treatment tolerated well; blood returned; stasis achieved; post report to drew KIM   Bilateral Breath Sounds Diminished   Edema Right upper extremity; Left upper extremity;Right lower extremity; Left lower extremity   Time Off 1625   Patient Disposition Return to room

## 2023-02-08 NOTE — CONSULTS
CARDIOLOGY CONSULTATION    Patient Name:  Viviana Palm    :  1955    Reason for Consultation:   CHF; shortness of breath    History of Present Illness:   Viviana Palm presents to [facility] following history of increasing shortness of breath and fatigability over the past few weeks. She denies any concomitant chest discomfort nor coughing. She also notes that she has gained a considerable amount of weight. She does have underlying multisystem disease including end-stage renal disease requiring dialysis as well as underlying diabetes mellitus and coronary disease for which she has multiple stents. She now presents for adjustment of her dialysis and general medical therapy. She denies hemoptysis presently. Past Medical History:   has a past medical history of Acute on chronic heart failure with preserved ejection fraction ([address] Acute pulmonary embolism ([address] Anemia due to stage 5 chronic kidney disease, not on chronic dialysis ([address] Anemia in CKD (chronic kidney disease), Anxiety and depression, Aortic stenosis, mild, At high risk for falls, Brain aneurysm, CLABSI (central line-associated bloodstream infection), Cough, Diabetes mellitus ([address] Diabetes mellitus type 2 with complications (HCC), Dizziness on standing, End-stage renal disease on hemodialysis ([address] ESRD (end stage renal disease) ([address] ESRD on hemodialysis ([address] Essential hypertension, Fever, unspecified, Gastrointestinal hemorrhage, H/O heart artery stent, History of Clostridioides difficile colitis, History of pulmonary embolus (PE), Hyperlipidemia, Hypertension, Leg swelling, Lymphedema of both lower extremities, MSSA bacteremia, Nausea & vomiting, Obesity, Class III, BMI 40-49.9 (morbid obesity) ([address] Periumbilical abdominal pain, and S/P insertion of inferior vena caval filter. Surgical History:   has a past surgical history that includes Cardiac surgery;   section; vascular surgery (N/A, 11/24/2021); Upper gastrointestinal endoscopy (N/A, 12/01/2021); Dialysis fistula creation (Left, 01/28/2022); Colonoscopy (2017); Cardiac catheterization (2015); Percutaneous Transluminal Coronary Angio (2015); Upper gastrointestinal endoscopy (N/A, 01/18/2023); and IVC filter insertion (01/20/2023). Social History:   reports that she quit smoking about 37 years ago. Her smoking use included cigarettes. She started smoking about 49 years ago. She has a 12.00 pack-year smoking history. She has never used smokeless tobacco. She reports that she does not drink alcohol and does not use drugs. Family History:  family history includes Coronary Art Dis in her brother, father, and mother. Medications:  Prior to Admission medications    Medication Sig Start Date End Date Taking?  Authorizing Provider   insulin glargine (LANTUS) 100 UNIT/ML injection vial Inject 20 Units into the skin nightly 2/1/23   Lito Anne, DO   insulin lispro (HUMALOG) 100 UNIT/ML SOLN injection vial Inject 0-8 Units into the skin 3 times daily (with meals) 2/1/23   Lito Anne DO   insulin lispro (HUMALOG) 100 UNIT/ML SOLN injection vial Inject 0-4 Units into the skin nightly 2/1/23   Mona Anne, DO   insulin lispro (HUMALOG) 100 UNIT/ML SOLN injection vial Inject 4 Units into the skin 3 times daily (with meals) 2/1/23   Mona Anne DO   Insulin Pen Needle ([employer] PEN NEEDLES 29G) 29G X 12MM MISC 1 each by Does not apply route daily 2/1/23   Lavonne Nageotte, MD   Blood Glucose Monitoring Suppl (BLOOD GLUCOSE MONITOR SYSTEM) w/Device KIT by Does not apply route  Patient not taking: Reported on 1/30/2023    Historical Provider, MD   glucose 4 g chewable tablet Take 4 tablets by mouth as needed for Low blood sugar 1/21/23   Mona Anne DO   pantoprazole (PROTONIX) 40 MG tablet Take 1 tablet by mouth 2 times daily (before meals) 1/21/23   Lito Anne DO   glucose monitoring (FREESTYLE FREEDOM) kit 1 kit by Does not apply route daily  Patient not taking: Reported on 1/30/2023 1/21/23   Shashank Anne DO   Lancets 30G MISC 1 each by Does not apply route daily  Patient not taking: Reported on 1/30/2023 1/21/23   Susanne Emelynwade Anne DO   blood glucose monitor strips Test 3 times a day. Dispense sufficient amount for indicated testing frequency plus additional to accommodate PRN testing needs. 1/21/23   Shashank Anne DO   rosuvastatin (CRESTOR) 5 MG tablet Take 1 tablet by mouth daily 12/6/22   Shashank Anne DO   acetaminophen (TYLENOL) 650 MG extended release tablet Take 1,300 mg by mouth every 8 hours as needed for Pain    Historical Provider, MD   midodrine (PROAMATINE) 10 MG tablet Take 10 mg by mouth daily as needed (AT DIALYSIS FOR LOW BP) 12/14/21   Historical Provider, MD   calcium acetate (PHOSLO) 667 MG CAPS capsule Take 667 mg by mouth 3 times daily (with meals) 4/5/21   Historical Provider, MD       Allergies:  Pcn [penicillins], Benzonatate, Morphine, and Sodium hypochlorite     Review of Systems:   Constitutional: there has been no unanticipated weight loss. There's been no significant change in energy or activity level, nor sleep pattern . No fever chills or rigors. Eyes: No visual changes or diplopia. No scleral icterus. ENT: No Headaches, hearing loss or vertigo. No mouth sores or sore throat. No change in taste or smell. Cardiovascular: No chest discomfort, + dyspnea on exertion, palpitations, but no loss of consciousness, no phlebitis, no claudication. Respiratory: No cough or wheezing, no sputum production. No hemoptysis, pleuritic pain. Gastrointestinal: No abdominal pain, appetite loss, blood in stools. No change in bowel habits. No hematemesis  Genitourinary: No dysuria, trouble voiding or hematuria. No nocturia or increased frequency. Musculoskeletal:  No gait disturbance, weakness or joint complaints. Integumentary: No rash or pruritis.   Neurological: No headache, diplopia, change in muscle strength, numbness or tingling. No change in gait, balance, coordination, mood, affect, memory, mentation, behavior. Psychiatric: No anxiety or depression. Endocrine: No temperature intolerance. No excessive thirst, fluid intake, or urination. No tremor. Hematologic/Lymphatic: No abnormal bruising or bleeding, blood clots or swollen lymph nodes. Allergic/Immunologic: No nasal congestion or hives. Physical Examination:    Vital Signs: BP (!) 155/70   Pulse 78   Temp 97.9 °F (36.6 °C) (Oral)   Resp 20   Ht 5' 4\" (1.626 m)   Wt 258 lb (117 kg)   SpO2 96%   BMI 44.29 kg/m²   General appearance: Well preserved, mesomorphic body habitus, alert, no distress. Skin: Skin color, texture, turgor normal. No rashes or lesions. No induration or tightening palpated. Head: Normocephalic. No masses, lesions, tenderness or abnormalities  Eyes: Conjunctivae/corneas clear. PERRL, EOMs intact. Sclera non icteric. Ears: External ears normal. Canals clear. TM's clear bilaterally. Hearing normal to finger rub. Nose/Sinuses: Nares normal. Septum midline. Mucosa normal. No drainage or sinus tenderness. Oropharynx: Lips, mucosa, and tongue normal. Oropharynx clear with no exudate seen. Neck: Neck supple and symmetric. No adenopathy. Thyroid symmetric, normal size, without nodules. Trachea is midline. Carotids brisk in upstroke without bruits, no abnormal JVP noted at 45°. Chest: Even excursion  Lungs: Lungs clear to auscultation bilaterally. No retractions or use of accessory muscles. No tactile vocal fremitus. No rhonchi, crackles or rales. Heart:  S1 > S2. Regular rhythm. No gallop or murmur. No rub, palpable thrill or heave noted. PMI 5th intercostal space midclavicular line. Abdomen: Abdomen soft, grossly protuberant, non-tender. BS normal. No masses, organomegaly. No hernia noted. Extremities: Extremities normal. No deformities, 2+ bilateral lower pretibial edema, or skin discoloration.   No cyanosis or clubbing noted to the nails. Peripheral pulses present 1+ upper extremities and present 1+  lower extremities. Musculoskeletal: Spine ROM normal. Muscular strength intact. Neuro: Cranial nerves intact. Motor: Strength 5/5 in all extremities. Reflexes 2+ in all extremities. No focal weakness. Sensory: grossly normal to touch. Coordination intact. Pertinent Labs:  CBC:   Recent Labs     02/07/23  1156   WBC 9.9   HGB 9.2*        BMP:  Recent Labs     02/07/23  1156 02/08/23  0850    137   K 4.5 4.2   CL 94* 96*   CO2 25 26   BUN 15 23   CREATININE 3.5* 5.1*   GLUCOSE 181* 191*   LABGLOM 14 9     ABGs: No results found for: PH, PO2, PCO2  INR: No results for input(s): INR in the last 72 hours. PRO-BNP:   Lab Results   Component Value Date    PROBNP 45,687 (H) 02/08/2023    PROBNP 52,305 (H) 02/07/2023      Cardiac Injury Profile:   Recent Labs     02/07/23  1255   TROPHS 91*      Lipid Profile:   Lab Results   Component Value Date/Time    TRIG 148 02/08/2023 08:50 AM    HDL 43 02/08/2023 08:50 AM    LDLCALC 78 02/08/2023 08:50 AM    CHOL 151 02/08/2023 08:50 AM      Thyroid:   Lab Results   Component Value Date    TSH 2.970 02/08/2023      Hemoglobin A1C: No components found for: HGBA1C   ECG:  See report    Radiology:  XR CHEST PORTABLE    Result Date: 2/7/2023  1. Cardiomegaly. There are no findings of CHF 2. Possible bibasilar airspace disease and or pleural effusions. CTA PULMONARY W CONTRAST    Result Date: 2/7/2023  1. Limited examination due to motion and suboptimal timing of contrast.  No large central or proximal segmental pulmonary arterial embolism. Pulmonary embolism seen on prior is not definitively visualized. Repeat CTA may be helpful for further evaluation. 2. Increased opacities in right lower lobe suggesting atelectasis and pneumonia. 3. New right pleural effusion. 4. Pulmonary vascular congestion and findings which could indicate right heart failure.      US DUP LOWER EXTREMITIES BILATERAL VENOUS    Result Date: 2/7/2023  No visible DVT in either lower extremity. Assessment:    Patient Active Problem List   Diagnosis Code    Nausea & vomiting R11.2    Essential hypertension I10    Hyperlipidemia E78.5    Diabetes mellitus type 2 with complications ([facility] F69.8    Neck pain M54.2    Anemia in CKD (chronic kidney disease) N18.9, D63.1    Pneumonia due to COVID-19 virus U07.1, J12.82    Pulmonary hypertension, unspecified (HCC) I27.20    Obesity, Class III, BMI 40-49.9 (morbid obesity) ([facility] [address] E66.01    History of Clostridioides difficile colitis Z86.19    Anxiety and depression F41.9, F32. A    At high risk for falls Z91.81    Dizziness on standing R42    Acute pulmonary embolism (HCC) I26.99    H/O heart artery stent Z95.5    Pulmonary embolism and infarction (HCC) I26.99    Diabetes mellitus (HCC) E11.9    Hematemesis K92.0    End-stage renal disease on hemodialysis (HCC) N18.6, Z99.2    Leg swelling M79.89    Lymphedema of both lower extremities I89.0    Rectal bleeding K62.5    BRBPR (bright red blood per rectum) K62.5    Acute on chronic heart failure with preserved ejection fraction (HCC) I50.33    Aortic stenosis, mild I35.0    Anemia due to stage 5 chronic kidney disease (HCC) N18.5, D63.1    History of pulmonary embolus (PE) Z86.711    S/P insertion of inferior vena caval filter Z95.828       Plan:  Of note is that Mrs. Garduno's dyspnea is probably multifactorial she is morbidly obese has end-stage renal disease has underlying coronary disease and thus her dialysis needs to be recalibrated and I will adjust her cardiac medications considering she does have a history of stents and although without chest discomfort may actually have an angina equivalent. She should also maintain her LDL cholesterol at < 55 mg/dL in accordance with updated 2020 ACC/AHA/AACE/ESC/EAS cholesterol guidelines.   I will follow her during this hospitalization and attempt to make appropriate changes to her benefit. I have spent more than 45 minutes face to face with Bita Khan reviewing notes and laboratory data with greater than 50% of this time instructing and counseling the patient regarding my findings and recommendations and I have answered all questions as posed to me by Ms. Trevor. Thank you, Tresa Morrison MD for allowing me to consult in the care of this patient. Isai Gonzalez DO , FACP, [facility] [place] [facility]    NOTE:  This report was transcribed using voice recognition software. Every effort was made to ensure accuracy; however, inadvertent computerized transcription errors may be present.

## 2023-02-09 ENCOUNTER — APPOINTMENT (OUTPATIENT)
Dept: GENERAL RADIOLOGY | Age: 68
DRG: 291 | End: 2023-02-09
Payer: MEDICARE

## 2023-02-09 ENCOUNTER — ANESTHESIA EVENT (OUTPATIENT)
Dept: ENDOSCOPY | Age: 68
End: 2023-02-09
Payer: MEDICARE

## 2023-02-09 LAB
ANION GAP SERPL CALCULATED.3IONS-SCNC: 15 MMOL/L (ref 7–16)
BASOPHILS ABSOLUTE: 0.05 E9/L (ref 0–0.2)
BASOPHILS RELATIVE PERCENT: 0.7 % (ref 0–2)
BUN BLDV-MCNC: 18 MG/DL (ref 6–23)
CALCIUM SERPL-MCNC: 8.2 MG/DL (ref 8.6–10.2)
CHLORIDE BLD-SCNC: 94 MMOL/L (ref 98–107)
CO2: 29 MMOL/L (ref 22–29)
CREAT SERPL-MCNC: 4.1 MG/DL (ref 0.5–1)
EOSINOPHILS ABSOLUTE: 0.49 E9/L (ref 0.05–0.5)
EOSINOPHILS RELATIVE PERCENT: 6.7 % (ref 0–6)
GFR SERPL CREATININE-BSD FRML MDRD: 11 ML/MIN/1.73
GLUCOSE BLD-MCNC: 259 MG/DL (ref 74–99)
HCT VFR BLD CALC: 31.1 % (ref 34–48)
HEMOGLOBIN: 9.5 G/DL (ref 11.5–15.5)
IMMATURE GRANULOCYTES #: 0.03 E9/L
IMMATURE GRANULOCYTES %: 0.4 % (ref 0–5)
LACTIC ACID, SEPSIS: 1.5 MMOL/L (ref 0.5–1.9)
LYMPHOCYTES ABSOLUTE: 1.2 E9/L (ref 1.5–4)
LYMPHOCYTES RELATIVE PERCENT: 16.4 % (ref 20–42)
MAGNESIUM: 2.1 MG/DL (ref 1.6–2.6)
MCH RBC QN AUTO: 30.7 PG (ref 26–35)
MCHC RBC AUTO-ENTMCNC: 30.5 % (ref 32–34.5)
MCV RBC AUTO: 100.6 FL (ref 80–99.9)
METER GLUCOSE: 116 MG/DL (ref 74–99)
METER GLUCOSE: 154 MG/DL (ref 74–99)
METER GLUCOSE: 242 MG/DL (ref 74–99)
METER GLUCOSE: 257 MG/DL (ref 74–99)
MONOCYTES ABSOLUTE: 0.74 E9/L (ref 0.1–0.95)
MONOCYTES RELATIVE PERCENT: 10.1 % (ref 2–12)
NEUTROPHILS ABSOLUTE: 4.79 E9/L (ref 1.8–7.3)
NEUTROPHILS RELATIVE PERCENT: 65.7 % (ref 43–80)
PDW BLD-RTO: 14.8 FL (ref 11.5–15)
PHOSPHORUS: 3.7 MG/DL (ref 2.5–4.5)
PLATELET # BLD: 176 E9/L (ref 130–450)
PMV BLD AUTO: 10.6 FL (ref 7–12)
POTASSIUM SERPL-SCNC: 3.8 MMOL/L (ref 3.5–5)
RBC # BLD: 3.09 E12/L (ref 3.5–5.5)
SODIUM BLD-SCNC: 138 MMOL/L (ref 132–146)
WBC # BLD: 7.3 E9/L (ref 4.5–11.5)

## 2023-02-09 PROCEDURE — 36415 COLL VENOUS BLD VENIPUNCTURE: CPT

## 2023-02-09 PROCEDURE — 2700000000 HC OXYGEN THERAPY PER DAY

## 2023-02-09 PROCEDURE — 82962 GLUCOSE BLOOD TEST: CPT

## 2023-02-09 PROCEDURE — 97530 THERAPEUTIC ACTIVITIES: CPT

## 2023-02-09 PROCEDURE — 6370000000 HC RX 637 (ALT 250 FOR IP): Performed by: INTERNAL MEDICINE

## 2023-02-09 PROCEDURE — 90935 HEMODIALYSIS ONE EVALUATION: CPT

## 2023-02-09 PROCEDURE — 71045 X-RAY EXAM CHEST 1 VIEW: CPT

## 2023-02-09 PROCEDURE — 97535 SELF CARE MNGMENT TRAINING: CPT

## 2023-02-09 PROCEDURE — 83605 ASSAY OF LACTIC ACID: CPT

## 2023-02-09 PROCEDURE — 80048 BASIC METABOLIC PNL TOTAL CA: CPT

## 2023-02-09 PROCEDURE — 85025 COMPLETE CBC W/AUTO DIFF WBC: CPT

## 2023-02-09 PROCEDURE — 2060000000 HC ICU INTERMEDIATE R&B

## 2023-02-09 PROCEDURE — 84100 ASSAY OF PHOSPHORUS: CPT

## 2023-02-09 PROCEDURE — 2500000003 HC RX 250 WO HCPCS: Performed by: INTERNAL MEDICINE

## 2023-02-09 PROCEDURE — 83735 ASSAY OF MAGNESIUM: CPT

## 2023-02-09 RX ORDER — ATORVASTATIN CALCIUM 10 MG/1
10 TABLET, FILM COATED ORAL DAILY
Status: DISCONTINUED | OUTPATIENT
Start: 2023-02-09 | End: 2023-02-12 | Stop reason: HOSPADM

## 2023-02-09 RX ORDER — AMLODIPINE BESYLATE 2.5 MG/1
2.5 TABLET ORAL DAILY
Status: DISCONTINUED | OUTPATIENT
Start: 2023-02-09 | End: 2023-02-12 | Stop reason: HOSPADM

## 2023-02-09 RX ADMIN — INSULIN LISPRO 1 UNITS: 100 INJECTION, SOLUTION INTRAVENOUS; SUBCUTANEOUS at 16:48

## 2023-02-09 RX ADMIN — ATORVASTATIN CALCIUM 10 MG: 10 TABLET, FILM COATED ORAL at 13:25

## 2023-02-09 RX ADMIN — CALCIUM ACETATE 667 MG: 667 CAPSULE ORAL at 06:16

## 2023-02-09 RX ADMIN — INSULIN LISPRO 2 UNITS: 100 INJECTION, SOLUTION INTRAVENOUS; SUBCUTANEOUS at 06:18

## 2023-02-09 RX ADMIN — INSULIN LISPRO 4 UNITS: 100 INJECTION, SOLUTION INTRAVENOUS; SUBCUTANEOUS at 06:16

## 2023-02-09 RX ADMIN — CALCIUM ACETATE 667 MG: 667 CAPSULE ORAL at 13:25

## 2023-02-09 RX ADMIN — CALCIUM ACETATE 667 MG: 667 CAPSULE ORAL at 16:48

## 2023-02-09 RX ADMIN — AMLODIPINE BESYLATE 2.5 MG: 2.5 TABLET ORAL at 13:25

## 2023-02-09 RX ADMIN — PANTOPRAZOLE SODIUM 40 MG: 40 TABLET, DELAYED RELEASE ORAL at 16:48

## 2023-02-09 RX ADMIN — INSULIN LISPRO 4 UNITS: 100 INJECTION, SOLUTION INTRAVENOUS; SUBCUTANEOUS at 16:49

## 2023-02-09 RX ADMIN — PANTOPRAZOLE SODIUM 40 MG: 40 TABLET, DELAYED RELEASE ORAL at 05:02

## 2023-02-09 ASSESSMENT — PAIN SCALES - GENERAL
PAINLEVEL_OUTOF10: 0
PAINLEVEL_OUTOF10: 0

## 2023-02-09 ASSESSMENT — LIFESTYLE VARIABLES: SMOKING_STATUS: 0

## 2023-02-09 NOTE — PROGRESS NOTES
[facility]  [address] [facility], [address] [place] [address] 93, [address] [facility]      Pulmonary Progress Note      SUBJECTIVE:  lying down in bed getting ready to go to HD, feeling some better with breathing, not much cough/congestion. Mentions possible EGD tomorrow to see if can start eliquis or not.     OBJECTIVE    Medications    Continuous Infusions:   dextrose         Scheduled Meds:   calcium acetate  667 mg Oral TID WC    insulin glargine  20 Units SubCUTAneous Nightly    insulin lispro  4 Units SubCUTAneous TID WC    pantoprazole  40 mg Oral BID AC    rosuvastatin  5 mg Oral Daily    insulin lispro  0-4 Units SubCUTAneous TID WC    insulin lispro  0-4 Units SubCUTAneous Nightly    heparin (porcine)  5,000 Units SubCUTAneous Q8H       PRN Meds:acetaminophen, glucose, midodrine, glucose, dextrose bolus **OR** dextrose bolus, glucagon (rDNA), dextrose, acetaminophen    Physical    VITALS:  BP (!) 117/49   Pulse 67   Temp 97.9 °F (36.6 °C) (Oral)   Resp 18   Ht 5' 4\" (1.626 m)   Wt 251 lb 5.2 oz (114 kg)   SpO2 100%   BMI 43.14 kg/m²     24HR INTAKE/OUTPUT:      Intake/Output Summary (Last 24 hours) at 2023 0833  Last data filed at 2023 1640  Gross per 24 hour   Intake 540 ml   Output 3300 ml   Net -2760 ml       24HR PULSE OXIMETRY RANGE:    SpO2  Av.8 %  Min: 95 %  Max: 100 %    General appearance: alert, appears stated age, and cooperative, obese  Lungs: rhonchi bibasilar  Heart: regular rate and rhythm, S1, S2 normal, no murmur, click, rub or gallop  Abdomen: soft, non-tender; bowel sounds normal; no masses,  no organomegaly  Extremities: extremities normal, atraumatic, no cyanosis or edema  Neurologic: Mental status: Alert, oriented, thought content appropriate    Data    CBC:   Recent Labs     23  1156 23  1030 23  0430   WBC 9.9 7.0 7.3   HGB 9.2* 8.6* 9.5*   HCT 28.6* 27.9* 31.1*   MCV 96.6 100.4* 100.6*    194 176 BMP:  Recent Labs     02/07/23  1156 02/08/23  0850 02/09/23  0430    137 138   K 4.5 4.2 3.8   CL 94* 96* 94*   CO2 25 26 29   PHOS  --  4.4 3.7   BUN 15 23 18   CREATININE 3.5* 5.1* 4.1*    ALB:3,BILIDIR:3,BILITOT:3,ALKPHOS:3)@    PT/INR:   Recent Labs     02/08/23  1030   PROTIME 13.0*   INR 1.2       ABG:   No results for input(s): PH, PO2, PCO2, HCO3, BE, O2SAT, METHB, O2HB, COHB, O2CON, HHB, THB in the last 72 hours. Radiology/Other tests reviewed: CXR reviewed with similar/slightly increased right pleural effusion    Assessment:     Principal Problem:    Acute on chronic heart failure with preserved ejection fraction (HCC)  Active Problems:    H/O heart artery stent    Diabetes mellitus (HCC)    End-stage renal disease on hemodialysis ([facility])    Lymphedema of both lower extremities    Anemia due to stage 5 chronic kidney disease ([facility])    S/P insertion of inferior vena caval filter    Essential hypertension    Obesity, Class III, BMI 40-49.9 (morbid obesity) ([facility]    Aortic stenosis, mild    History of pulmonary embolus (PE)  Resolved Problems:    * No resolved hospital problems. *      Plan:       Cont with HD as per Renal, watch fluid balance  Wants to hold off on thoracentesis and see if further HD will help control/lessen effusion, can do CXR over the weekend and decide on IR thoracentesis next week if still needed or not  Cont with oxygen, taper as tolerated  No lung medications, observe  OOB to chair, ambulate as tolerated      Time at the bedside, reviewing labs and radiographs, reviewing notes and consultations, discussing with staff and family was more than 35 minutes. Thanks for letting us see this patient in consultation. Please contact us with any questions. Office [phone] or after hours through [employer], x [phone].

## 2023-02-09 NOTE — PROGRESS NOTES
PROGRESS NOTE       PATIENT PROBLEM LIST:  Patient Active Problem List   Diagnosis Code    Nausea & vomiting R11.2    Essential hypertension I10    Hyperlipidemia E78.5    Diabetes mellitus type 2 with complications ([facility] [address] R73.5    Neck pain M54.2    Anemia in CKD (chronic kidney disease) N18.9, D63.1    Pneumonia due to COVID-19 virus U07.1, J12.82    Pulmonary hypertension, unspecified (HCC) I27.20    Obesity, Class III, BMI 40-49.9 (morbid obesity) ([facility] [address] E66.01    History of Clostridioides difficile colitis Z86.19    Anxiety and depression F41.9, F32. A    At high risk for falls Z91.81    Dizziness on standing R42    Acute pulmonary embolism (HCC) I26.99    H/O heart artery stent Z95.5    Pulmonary embolism and infarction (HCC) I26.99    Diabetes mellitus (HCC) E11.9    Hematemesis K92.0    End-stage renal disease on hemodialysis (HCC) N18.6, Z99.2    Leg swelling M79.89    Lymphedema of both lower extremities I89.0    Rectal bleeding K62.5    BRBPR (bright red blood per rectum) K62.5    Acute on chronic heart failure with preserved ejection fraction (HCC) I50.33    Aortic stenosis, mild I35.0    Anemia due to stage 5 chronic kidney disease (HCC) N18.5, D63.1    History of pulmonary embolus (PE) Z86.711    S/P insertion of inferior vena caval filter Z95.828       SUBJECTIVE:  Dennise Kwok states she feels about the same and is quite fatigued while on dialysis presently. She still notes shortness of breath with minimal exertion. She denies any chest discomfort. She has state we switch her from rosuvastatin to atorvastatin as she thinks she is having side effects from the 5 mg of rosuvastatin. REVIEW OF SYSTEMS:  General ROS: Positive for - fatigue and weight gain psychological ROS: negative for - anxiety , depression  Ophthalmic ROS: negative for - decreased vision or visual distortion.   ENT ROS: negative  Allergy and Immunology ROS: negative  Hematological and Lymphatic ROS: negative  Endocrine: no heat or cold intolerance and no polyphagia, polydipsia, or polyuria  Respiratory ROS: positive for - shortness of breath  Cardiovascular ROS: positive for - dyspnea on exertion, edema, and shortness of breath. Gastrointestinal ROS: no abdominal pain, change in bowel habits, or black or bloody stools  Genito-Urinary ROS: no nocturia, dysuria, trouble voiding, frequency or hematuria  Musculoskeletal ROS: negative for- myalgias, arthralgias, or claudication  Neurological ROS: no TIA or stroke symptoms otherwise no significant change in symptoms or problems since yesterday as documented in previous progress notes. SCHEDULED MEDICATIONS:   calcium acetate  667 mg Oral TID WC    insulin glargine  20 Units SubCUTAneous Nightly    insulin lispro  4 Units SubCUTAneous TID WC    pantoprazole  40 mg Oral BID AC    rosuvastatin  5 mg Oral Daily    insulin lispro  0-4 Units SubCUTAneous TID WC    insulin lispro  0-4 Units SubCUTAneous Nightly    heparin (porcine)  5,000 Units SubCUTAneous Q8H       VITAL SIGNS:                                                                                                                          BP (!) 161/66   Pulse 64   Temp 97.5 °F (36.4 °C)   Resp 18   Ht 5' 4\" (1.626 m)   Wt 258 lb 13.1 oz (117.4 kg)   SpO2 100%   BMI 44.43 kg/m²   Patient Vitals for the past 96 hrs (Last 3 readings):   Weight   02/09/23 0815 258 lb 13.1 oz (117.4 kg)   02/08/23 1640 251 lb 5.2 oz (114 kg)   02/08/23 1300 257 lb 15 oz (117 kg)     OBJECTIVE:    HEENT: PERRL, EOM  Intact; sclera non-icteric, conjunctiva pink. Carotids are brisk in upstroke with normal contour. No carotid bruits. Normal jugular venous pulsation at 45°. No palpable cervical nor supraclavicular nodes. Thyroid not palpable. Trachea midline. Chest: Even excursion  Lungs: Decreased bases otherwise grossly clear to auscultation bilaterally. No expiratory wheezes or rhonchi, no decreased tactile fremitus without inspiratory rales.   Heart: Regular  rhythm; S1 > S2, no gallop or murmur. No clicks, rub, palpable thrills   or heaves. PMI nondisplaced, 5th intercostal space MCL. Abdomen: Soft, nontender, nondistended,  grossly protuberant, no masses or organomegaly. Bowel sounds active. Extremities: Without clubbing, cyanosis 2+ bilateral lower pretibial edema. Pulses present 3+ upper extermities bilaterally; present 1+ DP  bilaterally and present 1+ PT bilaterally. Data:   Scheduled Meds: Reviewed  Continuous Infusions:    dextrose         Intake/Output Summary (Last 24 hours) at 2/9/2023 1003  Last data filed at 2/8/2023 1640  Gross per 24 hour   Intake 540 ml   Output 3300 ml   Net -2760 ml     CBC:   Recent Labs     02/07/23  1156 02/08/23  1030 02/09/23  0430   WBC 9.9 7.0 7.3   HGB 9.2* 8.6* 9.5*   HCT 28.6* 27.9* 31.1*    194 176     BMP:  Recent Labs     02/07/23  1156 02/08/23  0850 02/09/23  0430    137 138   K 4.5 4.2 3.8   CL 94* 96* 94*   CO2 25 26 29   BUN 15 23 18   CREATININE 3.5* 5.1* 4.1*   LABGLOM 14 9 11     ABGs: No results found for: PH, PO2, PCO2  INR:   Recent Labs     02/08/23  1030   INR 1.2     PRO-BNP:   Lab Results   Component Value Date    PROBNP 45,687 (H) 02/08/2023    PROBNP 52,305 (H) 02/07/2023      TSH:   Lab Results   Component Value Date    TSH 2.970 02/08/2023      Cardiac Injury Profile:   Recent Labs     02/07/23  1255   TROPHS 91*      Lipid Profile:   Lab Results   Component Value Date/Time    TRIG 148 02/08/2023 08:50 AM    HDL 43 02/08/2023 08:50 AM    LDLCALC 78 02/08/2023 08:50 AM    CHOL 151 02/08/2023 08:50 AM      Hemoglobin A1C: No components found for: HGBA1C      RAD:   XR CHEST PORTABLE    Result Date: 2/9/2023  Findings suggest some degree of CHF, unchanged     XR CHEST PORTABLE    Result Date: 2/7/2023  1. Cardiomegaly. There are no findings of CHF 2. Possible bibasilar airspace disease and or pleural effusions. CTA PULMONARY W CONTRAST    Result Date: 2/7/2023  1.  Limited examination due to motion and suboptimal timing of contrast.  No large central or proximal segmental pulmonary arterial embolism. Pulmonary embolism seen on prior is not definitively visualized. Repeat CTA may be helpful for further evaluation. 2. Increased opacities in right lower lobe suggesting atelectasis and pneumonia. 3. New right pleural effusion. 4. Pulmonary vascular congestion and findings which could indicate right heart failure. US DUP LOWER EXTREMITIES BILATERAL VENOUS    Result Date: 2/7/2023  No visible DVT in either lower extremity. EKG: See Report  Echo: See Report      IMPRESSIONS:  Patient Active Problem List   Diagnosis Code    Nausea & vomiting R11.2    Essential hypertension I10    Hyperlipidemia E78.5    Diabetes mellitus type 2 with complications ([address] L16.7    Neck pain M54.2    Anemia in CKD (chronic kidney disease) N18.9, D63.1    Pneumonia due to COVID-19 virus U07.1, J12.82    Pulmonary hypertension, unspecified (HCC) I27.20    Obesity, Class III, BMI 40-49.9 (morbid obesity) ([facility] [address] E66.01    History of Clostridioides difficile colitis Z86.19    Anxiety and depression F41.9, F32. A    At high risk for falls Z91.81    Dizziness on standing R42    Acute pulmonary embolism (HCC) I26.99    H/O heart artery stent Z95.5    Pulmonary embolism and infarction (HCC) I26.99    Diabetes mellitus (HCC) E11.9    Hematemesis K92.0    End-stage renal disease on hemodialysis (HCC) N18.6, Z99.2    Leg swelling M79.89    Lymphedema of both lower extremities I89.0    Rectal bleeding K62.5    BRBPR (bright red blood per rectum) K62.5    Acute on chronic heart failure with preserved ejection fraction (HCC) I50.33    Aortic stenosis, mild I35.0    Anemia due to stage 5 chronic kidney disease (HCC) N18.5, D63.1    History of pulmonary embolus (PE) Z86.711    S/P insertion of inferior vena caval filter Z95.828       RECOMMENDATIONS:  We will place on low-dose amlodipine and will review her previous two-dimensional echocardiogram to determine right ventricular dynamics based upon the inference made in the interpretation of her chest x-ray. I have spent more than 25 minutes face to face with Rafaela Lopes and reviewing notes and laboratory data, with greater than 50% of this time instructing and counseling the patient face to face regarding my findings and recommendations and I have answered all questions as posed to me by Ms. Thomasonza. Maco Yanes, DO FACP,FACC,[facility]      NOTE:  This report was transcribed using voice recognition software.   Every effort was made to ensure accuracy; however, inadvertent computerized transcription errors may be present

## 2023-02-09 NOTE — PLAN OF CARE
Problem: Discharge Planning  Goal: Discharge to home or other facility with appropriate resources  Outcome: Progressing     Problem: Pain  Goal: Verbalizes/displays adequate comfort level or baseline comfort level  2/9/2023 0022 by Derrick Desouza RN  Outcome: Progressing  2/8/2023 1616 by Aidan Haynes RN  Outcome: Progressing     Problem: Safety - Adult  Goal: Free from fall injury  2/9/2023 0022 by eDrrick Desouza RN  Outcome: Progressing  2/8/2023 1616 by Aidan Haynes RN  Outcome: Progressing     Problem: ABCDS Injury Assessment  Goal: Absence of physical injury  Outcome: Progressing

## 2023-02-09 NOTE — PLAN OF CARE
Problem: Discharge Planning  Goal: Discharge to home or other facility with appropriate resources  2/9/2023 0917 by Miguel Chung RN  Outcome: Progressing  2/9/2023 0022 by Donovan Kim RN  Outcome: Progressing     Problem: Pain  Goal: Verbalizes/displays adequate comfort level or baseline comfort level  2/9/2023 0917 by Miguel Chung RN  Outcome: Progressing  2/9/2023 0022 by Donovan Kim RN  Outcome: Progressing     Problem: Safety - Adult  Goal: Free from fall injury  2/9/2023 0917 by Miguel Chung RN  Outcome: Progressing  2/9/2023 0022 by Donovan Kim RN  Outcome: Progressing     Problem: Chronic Conditions and Co-morbidities  Goal: Patient's chronic conditions and co-morbidity symptoms are monitored and maintained or improved  Outcome: Progressing     Problem: ABCDS Injury Assessment  Goal: Absence of physical injury  2/9/2023 0917 by Miguel Chung RN  Outcome: Progressing  2/9/2023 0022 by Donovan Kim RN  Outcome: Progressing

## 2023-02-09 NOTE — PROGRESS NOTES
Patient in dialysis. I will see her to provide CHF eduction upon her return.     Jez VARGAS, RN  Heart Failure Navigator

## 2023-02-09 NOTE — PROGRESS NOTES
Occupational Therapy  OT BEDSIDE TREATMENT NOTE      Date:2023  Patient Name: Rafaela Lopes  MRN: [id number]  : 1955  Room: [address] [place] [address]     Evaluating OT: PRAVIN Chakraborty/NINOSKA Escudero [id number]     Referring Provider: Lucille Hall DO  Specific Provider Orders/Date: \"OT eval and treat\" - 2023     Diagnosis: Shortness of breath [R06.02]  ESRD (end stage renal disease) on dialysis ([facility] [address] [N18.6, Z99.2]  Acute respiratory failure with hypoxia ([address] [J96.01]  Acute on chronic diastolic CHF (congestive heart failure) ([address] [I50.33]  Single subsegmental pulmonary embolism without acute cor pulmonale ([facility] [address] [I26.93]      Pertinent Medical History: recent IVC filter insertion (2023), DM, ESRD, HTN, anxiety and depression, lymphedema, obesity      Precautions: fall risk, O2      Assessment of Current Deficits:    [x] Functional mobility             [x]ADLs           [x] Strength                  [x]Cognition   [x] Functional transfers           [x] IADLs         [x] Safety Awareness   [x]Endurance   [] Fine Coordination              [x] Balance      [] Vision/perception   [x]Sensation     []Gross Motor Coordination  [] ROM           [] Delirium                   [] Motor Control      OT PLAN OF CARE   OT POC is based on physician orders, patient diagnosis, and results of clinical assessment.   Frequency/Duration 2-5 days/week for 2 weeks PRN   Specific OT Treatment Interventions to Include:   * Instruction/training on adapted ADL techniques and AE recommendations to increase functional independence within precautions       * Training on energy conservation strategies, correct breathing pattern and techniques to improve independence/tolerance for self-care routine  * Functional transfer/mobility training/DME recommendations for increased independence, safety, and fall prevention  * Patient/Family education to increase follow through with safety techniques and functional independence  * Recommendation of environmental modifications for increased safety with functional transfers/mobility and ADLs  * Therapeutic exercise to improve motor endurance, ROM, and functional strength for ADLs/functional transfers  * Therapeutic activities to facilitate/challenge dynamic balance, stand tolerance for increased safety and independence with ADLs  * Neuro-muscular re-education: facilitation of righting/equilibrium reactions, midline orientation, scapular stability/mobility, normalization of muscle tone, and facilitation of volitional active controled movement  * Positioning to improve skin integrity, interaction with environment and functional independence     Recommended Adaptive Equipment: continue to assess      Home Living: Patient lives with her daughter in a one-floor plan. Bathroom Setup: tub shower (with grab bar, but no seat) and elevated toilet seat  Equipment Owned: walker, wheelchair, home O2     Prior Level of Function (PLOF): Patient is typically independent with ADLs; patient's daughter is primarily responsible for completion of IADLs. Patient had been using a walker for functional mobility short distances. Pain Level: Patient denied experiencing pain. Reports fatigue. Cognition: Patient alert and oriented x3. Functional Assessment:                  AM-PAC Daily Activity Raw Score: 16/24    Initial Eval Status  Date: 2/8/2023 Treatment Status  Date: 2/9/23  Short Term Goals = Long Term Goals   Feeding Setup Independent   Independent   Grooming CGA Setup   Mod I / Independent (seated/standing at sink)   UB Dressing Setup Min A to change gown. Mod I / Anna Johnston (including item retrieval)   LB Dressing Mod A   Mod I / Independent - with use of AE, as needed/appropriate   Bathing Mod A   Mod I / Independent - with use of AE/DME, as needed/appropriate   Toileting CGA   Mod I / Independent   Bed Mobility  Supine-to-Sit: Min A  Assistance needed with L LE.    Independent / Mod I in order to maximize patient's independence with ADLs, re-positioning, and other functional tasks. Functional Transfers Sit-to-Stand: SBA   from EOB Pt sitting in the chair. Sit to stand SBA. Independent   Functional Mobility SBA (with walker) within patient's room and hallway. Shortness of breath noted with minimal functional mobility; patient's O2 saturation was 85% initially upon sitting in bedside chair (wearing 2L of O2). Patient's O2 saturations improved to 90% with seated rest.    SBA using w/w for short distance in the room. Fatigue limiting mobility. Mod I with functional mobility (with device, as needed/appropriate) in order to maximize independence with ADLs/IADLs and other functional tasks. Balance Sitting: Good (at EOB)  Standing: Fair (with walker) Stand balance SBA during ADL. Fair+ dynamic standing balance during completion of ADLs/IADLs and other functional tasks. Activity Tolerance Fair- Limited. Fatigue from dialysis. Patient will demonstrate Good understanding and consistent implementation of energy conservation techniques and work simplification techniques into ADL/IADL routines. Visual/  Perceptual Impaired  Patient reported having impaired vision in B eyes, which she attributed to cataracts. N/A   B UE Strength 4-/5   Patient will demonstrate 4+/5 B UE strength in order to maximize independence with ADLs/IADLs and functional transfers. Comments:  pt sitting in the chair. Fatigued but willing to participate. O2 saturation 85% after mobility. Remained seated in the chair at the end of the session. Education/treatment:  ADL retraining with facilitation of movement to increase self care skills. Therapeutic activity to address balance and endurance for ADL and transfers. Pt education of energy conservation. Pt has made  progress towards set goals.        Time In: 2:00   Time Out: 2:15     Min Units   Therapeutic Ex [id number]     Therapeutic Activities [id number]    ADL/Self Care [id number] 10 1   Orthotic Management [id number]     Neuro Re-Ed [id number]     Non-Billable Time     TOTAL TIMED TREATMENT 15 [address] [facility] ENOC/L [id number]

## 2023-02-09 NOTE — PROGRESS NOTES
PROGRESS NOTE      Patient Presents with/Seen in Consultation For      *Reason for Consult: endoscopy tomorrow  CHIEF COMPLAINT:  shortness of breath  Subjective:     Patient seen Evon Jackson in bed currently in dialysis no apparent distress. No current GI complaints. Tolerating diet. No complaints of abdominal pain or nausea. Plan for repeat EGD tomorrow a.m., patient verbalizing understanding and agreement to proceed. Review of Systems  Aside from what was mentioned in the PMH and HPI, essentially unremarkable, all others negative. Objective:     BP (!) 115/55   Pulse 67   Temp 97.5 °F (36.4 °C)   Resp 18   Ht 5' 4\" (1.626 m)   Wt 258 lb 13.1 oz (117.4 kg)   SpO2 100%   BMI 44.43 kg/m²     General appearance: alert, awake, laying in bed in dialysis, and cooperative  Eyes: conjunctiva pale, sclera anicteric. PERRL.   Lungs: clear to auscultation bilaterally  Heart: regular rate and rhythm, no murmur, 2+ pulses; trace edema  Abdomen: soft, non-tender; bowel sounds normal; no masses,  no organomegaly  Extremities: extremities trace edema  Pulses: 2+ and symmetric  Skin: Skin color, texture, turgor normal.   Neurologic: Grossly normal    atorvastatin (LIPITOR) tablet 10 mg, Daily  amLODIPine (NORVASC) tablet 2.5 mg, Daily  acetaminophen (TYLENOL) tablet 1,300 mg, Q8H PRN  calcium acetate (PHOSLO) capsule 667 mg, TID WC  glucose chewable tablet 16 g, PRN  insulin glargine (LANTUS) injection vial 20 Units, Nightly  insulin lispro (HUMALOG) injection vial 4 Units, TID WC  midodrine (PROAMATINE) tablet 10 mg, Daily PRN  pantoprazole (PROTONIX) tablet 40 mg, BID AC  glucose chewable tablet 16 g, PRN  dextrose bolus 10% 125 mL, PRN   Or  dextrose bolus 10% 250 mL, PRN  glucagon (rDNA) injection 1 mg, PRN  dextrose 10 % infusion, Continuous PRN  insulin lispro (HUMALOG) injection vial 0-4 Units, TID WC  insulin lispro (HUMALOG) injection vial 0-4 Units, Nightly  heparin (porcine) injection 5,000 Units, Q8H  acetaminophen (TYLENOL) tablet 650 mg, Q4H PRN       Data Review  CBC:   Lab Results   Component Value Date/Time    WBC 7.3 02/09/2023 04:30 AM    RBC 3.09 02/09/2023 04:30 AM    HGB 9.5 02/09/2023 04:30 AM    HCT 31.1 02/09/2023 04:30 AM    .6 02/09/2023 04:30 AM    MCH 30.7 02/09/2023 04:30 AM    MCHC 30.5 02/09/2023 04:30 AM    RDW 14.8 02/09/2023 04:30 AM     02/09/2023 04:30 AM    MPV 10.6 02/09/2023 04:30 AM     CMP:    Lab Results   Component Value Date/Time     02/09/2023 04:30 AM    K 3.8 02/09/2023 04:30 AM    K 4.2 08/23/2022 12:12 PM    CL 94 02/09/2023 04:30 AM    CO2 29 02/09/2023 04:30 AM    BUN 18 02/09/2023 04:30 AM    CREATININE 4.1 02/09/2023 04:30 AM    GFRAA 8 09/22/2022 12:14 PM    LABGLOM 11 02/09/2023 04:30 AM    GLUCOSE 259 02/09/2023 04:30 AM    PROT 6.8 02/08/2023 08:50 AM    LABALBU 3.4 02/08/2023 08:50 AM    CALCIUM 8.2 02/09/2023 04:30 AM    BILITOT 0.4 02/08/2023 08:50 AM    ALKPHOS 89 02/08/2023 08:50 AM    AST 16 02/08/2023 08:50 AM    ALT 50 02/08/2023 08:50 AM     Hepatic Function Panel:    Lab Results   Component Value Date/Time    ALKPHOS 89 02/08/2023 08:50 AM    ALT 50 02/08/2023 08:50 AM    AST 16 02/08/2023 08:50 AM    PROT 6.8 02/08/2023 08:50 AM    BILITOT 0.4 02/08/2023 08:50 AM    BILIDIR <0.2 02/08/2023 08:50 AM    IBILI see below 02/08/2023 08:50 AM    LABALBU 3.4 02/08/2023 08:50 AM     No components found for: CHLPL    Lab Results   Component Value Date    TRIG 148 02/08/2023    TRIG 143 01/31/2023    TRIG 173 (H) 01/18/2023       Lab Results   Component Value Date    HDL 43 02/08/2023    HDL 43 01/31/2023    HDL 41 01/18/2023       Lab Results   Component Value Date    LDLCALC 78 02/08/2023    LDLCALC 100 (H) 01/31/2023    LDLCALC 79 01/18/2023       Lab Results   Component Value Date    LABVLDL 30 02/08/2023    LABVLDL 29 01/31/2023    LABVLDL 35 01/18/2023      PT/INR:    Lab Results   Component Value Date/Time    PROTIME 13.0 02/08/2023 10:30 AM    INR 1.2 02/08/2023 10:30 AM     IRON:    Lab Results   Component Value Date/Time    IRON 45 01/31/2023 06:35 AM     Iron Saturation:  No components found for: PERCENTFE  FERRITIN:    Lab Results   Component Value Date/Time    FERRITIN 1,357 01/31/2023 06:35 AM         Assessment:     Active Problems:  SOB  Anemia, normocytic  ESRD on hemodialysis   PE- was on Eliquis- on hold recently d/t GIB  DM  EGD 1/18/23-Grade B LA classification GERD and gastritis, minimal, H. pylori negative. Duodenum showed severe duodenitis with superficial ulceration. Biopsies were done, pending. S/p IVC filter 1/20/23  Pleural effusions  R HF  Pneumonia     Plan:   EGD tomorrow. Procedure details for EGD discussed in detail. Complications including but not limited to, perforation, bleeding and infection were discussed in great detail. Risks, benefits, and alternatives explained. Pt has understood the information and has agreed to proceed. Orders have been placed  Continue Protonix BID dosing  Diet as tolerated today  Pulmonary & Cardiology following patient  Supportive care  Monitor stools  Medical management per Primary, including pain management   Trend labs  Will follow      Note: This report was completed utilizing computer voice recognition software. Every effort has been made to ensure accuracy, however; inadvertent computerized transcription errors may be present.      Discussed with Dr. Kamilah Welch per Dr. David Sweet APRN-NP-C 2/9/2023  12:26 PM For Dr. Katie Calvin

## 2023-02-09 NOTE — PROGRESS NOTES
PROGRESS  NOTE --                                                          INTERNAL  MEDICINE                                                                              I  PERSONALLY SAW , EXAMINED, AND CARED Shanique [address], 2/9/2023     LABS, XRAY ,CHART, AND MEDICATIONS  REVIEWED BY ME       Chief complaint: Short of breath      2/9/2023-SUBJECTIVE: Gayathri Hernandez is alert awake and cooperative; oriented ×3. Denies any chest pain nausea emesis. Tolerating diet. No abdominal pain. Sitting in bedside chair; only real complaint is fatigue. I discussed with her results on chest x-ray; she states it was done before today's dialysis. Afebrile last 24 hours. Pulse 69 blood pressure 175/61. SPO2 100 on 2 L nasal cannula. Intake and output -2520 cc. BUN 18 creatinine 4.1. Fasting glucose 259 calcium 8.2. Ionized calcium done yesterday low at 1.09. LDL from yesterday 78; proBNP slightly improved 45,687. A1c 10.3. TSH 2.970. Hemoglobin 9.5 WBC 7.3. Molecular/PCR viral respiratory panel all not detected. Blood cultures pending. Chest x-ray from today pending. Hemodialysis performed yesterday with net removed 3000 cc. Pulmonary note from today appreciated. Feeling some better, hold off on thoracentesis EF further hemodialysis will help control and less of the effusion. Cardiology note from today appreciated; patient quite fatigued while on dialysis. Still short of breath with exertion. She believes she is having side effects from rosuvastatin and would prefer atorvastatin. Start low-dose amlodipine reviewed previous 2D echo determine right ventricular dynamics. Hematology note from today, continue hemodialysis. GI consult from 2/8 appreciated; consider resuming apixaban if ulcerations have healed.  note from today, will return home with home health care. GI note from today EGD planned in a.m. Patient underwent dialysis this morning with net removal of 2700 cc. Nephrology note from today appreciated. Refusing thoracentesis will consider if effusion is not improving. Scheduled for 3-hour UF WMD treatment tomorrow; continue IHD for removal of solute and volume. Physical therapy AMPAC score from today 20/24. SPO2 dropped to 85% on 2 L recovered when seated with rest.  Chest x-ray from today with following results--    FINDINGS:   Heart is mildly enlarged and there is borderline prominence of the pulmonary   vascularity. There is a moderate size right pleural effusion unchanged.    Remainder of the lungs and pleural spaces are normal.       Impression:       Findings suggest some degree of CHF, unchanged          Objective:     PHYSICAL EXAM:    VS: BP (!) 175/61   Pulse 69   Temp 97.9 °F (36.6 °C) (Oral)   Resp 18   Ht 5' 4\" (1.626 m)   Wt 251 lb 5.2 oz (114 kg)   SpO2 100%   BMI 43.14 kg/m²     Labs:   CBC:   Lab Results   Component Value Date/Time    WBC 7.3 02/09/2023 04:30 AM    RBC 3.09 02/09/2023 04:30 AM    HGB 9.5 02/09/2023 04:30 AM    HCT 31.1 02/09/2023 04:30 AM    .6 02/09/2023 04:30 AM    MCH 30.7 02/09/2023 04:30 AM    MCHC 30.5 02/09/2023 04:30 AM    RDW 14.8 02/09/2023 04:30 AM     02/09/2023 04:30 AM    MPV 10.6 02/09/2023 04:30 AM     CBC with Differential:    Lab Results   Component Value Date/Time    WBC 7.3 02/09/2023 04:30 AM    RBC 3.09 02/09/2023 04:30 AM    HGB 9.5 02/09/2023 04:30 AM    HCT 31.1 02/09/2023 04:30 AM     02/09/2023 04:30 AM    .6 02/09/2023 04:30 AM    MCH 30.7 02/09/2023 04:30 AM    MCHC 30.5 02/09/2023 04:30 AM    RDW 14.8 02/09/2023 04:30 AM    NRBC 0.0 01/13/2022 04:38 AM    LYMPHOPCT 16.4 02/09/2023 04:30 AM    MONOPCT 10.1 02/09/2023 04:30 AM    BASOPCT 0.7 02/09/2023 04:30 AM    MONOSABS 0.74 02/09/2023 04:30 AM    LYMPHSABS 1.20 02/09/2023 04:30 AM    EOSABS 0.49 02/09/2023 04:30 AM    BASOSABS 0.05 02/09/2023 04:30 AM Hemoglobin/Hematocrit:    Lab Results   Component Value Date/Time    HGB 9.5 02/09/2023 04:30 AM    HCT 31.1 02/09/2023 04:30 AM     CMP:    Lab Results   Component Value Date/Time     02/09/2023 04:30 AM    K 3.8 02/09/2023 04:30 AM    K 4.2 08/23/2022 12:12 PM    CL 94 02/09/2023 04:30 AM    CO2 29 02/09/2023 04:30 AM    BUN 18 02/09/2023 04:30 AM    CREATININE 4.1 02/09/2023 04:30 AM    GFRAA 8 09/22/2022 12:14 PM    LABGLOM 11 02/09/2023 04:30 AM    GLUCOSE 259 02/09/2023 04:30 AM    PROT 6.8 02/08/2023 08:50 AM    LABALBU 3.4 02/08/2023 08:50 AM    CALCIUM 8.2 02/09/2023 04:30 AM    BILITOT 0.4 02/08/2023 08:50 AM    ALKPHOS 89 02/08/2023 08:50 AM    AST 16 02/08/2023 08:50 AM    ALT 50 02/08/2023 08:50 AM     BMP:    Lab Results   Component Value Date/Time     02/09/2023 04:30 AM    K 3.8 02/09/2023 04:30 AM    K 4.2 08/23/2022 12:12 PM    CL 94 02/09/2023 04:30 AM    CO2 29 02/09/2023 04:30 AM    BUN 18 02/09/2023 04:30 AM    LABALBU 3.4 02/08/2023 08:50 AM    CREATININE 4.1 02/09/2023 04:30 AM    CALCIUM 8.2 02/09/2023 04:30 AM    GFRAA 8 09/22/2022 12:14 PM    LABGLOM 11 02/09/2023 04:30 AM    GLUCOSE 259 02/09/2023 04:30 AM     Hepatic Function Panel:    Lab Results   Component Value Date/Time    ALKPHOS 89 02/08/2023 08:50 AM    ALT 50 02/08/2023 08:50 AM    AST 16 02/08/2023 08:50 AM    PROT 6.8 02/08/2023 08:50 AM    BILITOT 0.4 02/08/2023 08:50 AM    BILIDIR <0.2 02/08/2023 08:50 AM    IBILI see below 02/08/2023 08:50 AM    LABALBU 3.4 02/08/2023 08:50 AM     Ionized Calcium:  No results found for: IONCA  Magnesium:    Lab Results   Component Value Date/Time    MG 2.1 02/09/2023 04:30 AM     Phosphorus:    Lab Results   Component Value Date/Time    PHOS 3.7 02/09/2023 04:30 AM     LDH:    Lab Results   Component Value Date/Time     01/11/2022 12:45 PM     Uric Acid:    Lab Results   Component Value Date/Time    LABURIC 5.1 02/08/2023 08:50 AM     PT/INR:    Lab Results Component Value Date/Time    PROTIME 13.0 02/08/2023 10:30 AM    INR 1.2 02/08/2023 10:30 AM     Warfarin PT/INR:  No components found for: PTPATWAR, PTINRWAR  PTT:    Lab Results   Component Value Date/Time    APTT 34.5 01/17/2023 08:35 AM   [APTT}  Troponin:  No results found for: TROPONINI  Last 3 Troponin:  No results found for: TROPONINI  U/A:    Lab Results   Component Value Date/Time    COLORU Yellow 11/18/2021 03:19 AM    PROTEINU >=300 11/18/2021 03:19 AM    PHUR 6.0 11/18/2021 03:19 AM    WBCUA 1-3 11/18/2021 03:19 AM    RBCUA 0-1 11/18/2021 03:19 AM    BACTERIA FEW 11/18/2021 03:19 AM    CLARITYU Clear 11/18/2021 03:19 AM    SPECGRAV 1.020 11/18/2021 03:19 AM    LEUKOCYTESUR Negative 11/18/2021 03:19 AM    UROBILINOGEN 0.2 11/18/2021 03:19 AM    BILIRUBINUR Negative 11/18/2021 03:19 AM    BLOODU SMALL 11/18/2021 03:19 AM    GLUCOSEU 500 11/18/2021 03:19 AM     ABG:  No results found for: PH, PCO2, PO2, HCO3, BE, THGB, TCO2, O2SAT  HgBA1c:    Lab Results   Component Value Date/Time    LABA1C 10.3 02/08/2023 11:00 AM     FLP:    Lab Results   Component Value Date/Time    TRIG 148 02/08/2023 08:50 AM    HDL 43 02/08/2023 08:50 AM    LDLCALC 78 02/08/2023 08:50 AM    LABVLDL 30 02/08/2023 08:50 AM     TSH:    Lab Results   Component Value Date/Time    TSH 2.970 02/08/2023 08:50 AM     VITAMIN B12: No components found for: B12  FOLATE:    Lab Results   Component Value Date/Time    FOLATE 10.9 02/08/2023 10:30 AM     IRON:    Lab Results   Component Value Date/Time    IRON 45 01/31/2023 06:35 AM     Iron Saturation:  No components found for: PERCENTFE  TIBC:    Lab Results   Component Value Date/Time    TIBC 173 01/31/2023 06:35 AM     FERRITIN:    Lab Results   Component Value Date/Time    FERRITIN 1,357 01/31/2023 06:35 AM        General appearance: Alert, Awake, Oriented times 3, no distress, nasal cannula oxygen in place, morbidly obese  Skin: Warm and dry ; no rashes  Head: Normocephalic.  No masses, lesions or tenderness noted  Eyes: Conjunctivae pale, sclera white. PERRL,EOM-I  Ears: External ears normal  Nose/Sinuses: Nares normal. Septum midline. Mucosa normal. No drainage  Oropharynx: Oropharynx clear with no exudate seen  Neck: Supple. No jugular venous distension, lymphadenopathy or thyromegaly Trachea midline  Lungs: Decreased breath sounds right lung scattered rhonchi  Heart: S1 S2  Regular rate and rhythm. No rub, gallop; grade 1/6 systolic murmur second intercostal space  Abdomen: Soft, non-tender. BS normal. No masses, organomegaly; no rebound or guarding  Extremities: 2-3+ bilateral edema  Musculoskeletal: Muscular strength appears intact. Neuro:  No focal motor defects ; II-XII grossly intact . MORRIS equally    TELEMETRY: REVIEWED--Telemetry: Sinus    ASSESSMENT:   Principal Problem:    Acute on chronic heart failure with preserved ejection fraction (HCC)  Active Problems:    H/O heart artery stent    Diabetes mellitus (HCC)    End-stage renal disease on hemodialysis (HCC)    Lymphedema of both lower extremities    Aortic stenosis, mild    Anemia due to stage 5 chronic kidney disease (HCC)    History of pulmonary embolus (PE)    S/P insertion of inferior vena caval filter    Essential hypertension    Obesity, Class III, BMI 40-49.9 (morbid obesity) ([facility] [address]  Resolved Problems:    * No resolved hospital problems. *      PLAN:  SEE ORDERS      RE  CHANGES AND FINDINGS   Medications reviewed with patient  GI prophylaxis  DVT prophylaxis  Consultants notes reviewed    Continue hemodialysis  Lantus 20 units nightly  Low-dose sliding scale insulin  Humalog 4 units 3 times daily  Rosuvastatin 5 mg  has been discontinued  Atorvastatin 10 mg daily has been started  She may need thoracentesis in view of persistent effusion    Discussed with patient and nursing. Lito Anne DO     9:14 AM     2/9/2023    TIME > 35 MINUTES    Please note that over 35 minutes was spent .   Greater than 51% includes interviewing patient and reviewing chart; performing medical examination; ordering medications, tests and/or procedures; formulating assessment plan, reviewing rationale for the above recommendations; reviewing available records, results of all previously ordered tests and procedures and current problem list; counseling/educating the patient; coordinating care with other healthcare professionals; communicating results to the patient's family/caregiver; documenting clinical information in the electronic record                ------------  INFORMATION  -----------      DIET:ADULT DIET; Regular; 4 carb choices (60 gm/meal); Low Fat/Low Chol/High Fiber/CHELSEA  Diet NPO        Allergies   Allergen Reactions    Pcn [Penicillins] Shortness Of Breath and Rash    Benzonatate Other (See Comments)     \"PATIENTS STATES \"IT WAS LIKE I WAS DRUNK. \"    Morphine Itching and Rash    Sodium Hypochlorite Nausea And Vomiting and Rash     AKA BLEACH. RASH FROM SKIN EXPOSURE          MEDICATION SIDE EFFECTS:none       SCHEDULED MEDS:                                 Scheduled Meds:   calcium acetate  667 mg Oral TID WC    insulin glargine  20 Units SubCUTAneous Nightly    insulin lispro  4 Units SubCUTAneous TID WC    pantoprazole  40 mg Oral BID AC    rosuvastatin  5 mg Oral Daily    insulin lispro  0-4 Units SubCUTAneous TID WC    insulin lispro  0-4 Units SubCUTAneous Nightly    heparin (porcine)  5,000 Units SubCUTAneous Q8H       Continuous Infusions:   dextrose           Data:       Intake/Output Summary (Last 24 hours) at 2/9/2023 0914  Last data filed at 2/8/2023 1640  Gross per 24 hour   Intake 540 ml   Output 3300 ml   Net -2760 ml       Wt Readings from Last 3 Encounters:   02/08/23 251 lb 5.2 oz (114 kg)   01/31/23 260 lb 5.8 oz (118.1 kg)   01/30/23 262 lb (118.8 kg)       Labs: Additional    GLUCOSE:No results for input(s): POCGLU in the last 72 hours.     BNP:No results found for: BNP    CRP: No results for input(s): CRP in the last 72 hours. ESR:No results for input(s): SEDRATE in the last 72 hours. RADIOLOGY: REVIEWED AVAILABLE REPORT  CTA PULMONARY W CONTRAST   Final Result   1. Limited examination due to motion and suboptimal timing of contrast.  No   large central or proximal segmental pulmonary arterial embolism. Pulmonary   embolism seen on prior is not definitively visualized. Repeat CTA may be   helpful for further evaluation. 2. Increased opacities in right lower lobe suggesting atelectasis and   pneumonia. 3. New right pleural effusion. 4. Pulmonary vascular congestion and findings which could indicate right   heart failure. US DUP LOWER EXTREMITIES BILATERAL VENOUS   Final Result   No visible DVT in either lower extremity. XR CHEST PORTABLE   Final Result   1. Cardiomegaly. There are no findings of CHF   2. Possible bibasilar airspace disease and or pleural effusions.          XR CHEST PORTABLE    (Results Pending)         Toni Nascimento DO    9:14 AM     2/9/2023      Voice recognition software used for dictation

## 2023-02-09 NOTE — ANESTHESIA PRE PROCEDURE
Department of Anesthesiology  Preprocedure Note       Name:  Pita Eugene   Age:  78 y.o.  :  1955                                          MRN:  [id number]         Date:  2023      Surgeon: Sandra Mobley):  Irene Pop MD    Procedure: Procedure(s):  EGD ESOPHAGOGASTRODUODENOSCOPY    Medications prior to admission:   Prior to Admission medications    Medication Sig Start Date End Date Taking? Authorizing Provider   insulin glargine (LANTUS) 100 UNIT/ML injection vial Inject 20 Units into the skin nightly 23   Kerrie Anne, DO   insulin lispro (HUMALOG) 100 UNIT/ML SOLN injection vial Inject 0-8 Units into the skin 3 times daily (with meals) 23   Kerrie Anne, DO   insulin lispro (HUMALOG) 100 UNIT/ML SOLN injection vial Inject 0-4 Units into the skin nightly 23   Lena Anne, DO   insulin lispro (HUMALOG) 100 UNIT/ML SOLN injection vial Inject 4 Units into the skin 3 times daily (with meals) 23   Kerrie Anne, DO   Insulin Pen Needle ([employer] PEN NEEDLES 29G) 29G X 12MM MISC 1 each by Does not apply route daily 23   Katalina Duffy MD   Blood Glucose Monitoring Suppl (BLOOD GLUCOSE MONITOR SYSTEM) w/Device KIT by Does not apply route  Patient not taking: Reported on 2023    Historical Provider, MD   glucose 4 g chewable tablet Take 4 tablets by mouth as needed for Low blood sugar 23   Lena Anne DO   pantoprazole (PROTONIX) 40 MG tablet Take 1 tablet by mouth 2 times daily (before meals) 23   Kerrie Anne DO   glucose monitoring (FREESTYLE FREEDOM) kit 1 kit by Does not apply route daily  Patient not taking: Reported on 2023   Kerrie Anne DO   Lancets 30G MISC 1 each by Does not apply route daily  Patient not taking: Reported on 2023   Lena Anne DO   blood glucose monitor strips Test 3 times a day. Dispense sufficient amount for indicated testing frequency plus additional to accommodate PRN testing needs.  23 Jose May Dayana, DO   rosuvastatin (CRESTOR) 5 MG tablet Take 1 tablet by mouth daily 12/6/22   Fledominique May Dayana, DO   acetaminophen (TYLENOL) 650 MG extended release tablet Take 1,300 mg by mouth every 8 hours as needed for Pain    Historical Provider, MD   midodrine (PROAMATINE) 10 MG tablet Take 10 mg by mouth daily as needed (AT DIALYSIS FOR LOW BP) 12/14/21   Historical Provider, MD   calcium acetate (PHOSLO) 667 MG CAPS capsule Take 667 mg by mouth 3 times daily (with meals) 4/5/21   Historical Provider, MD       Current medications:    Current Facility-Administered Medications   Medication Dose Route Frequency Provider Last Rate Last Admin    atorvastatin (LIPITOR) tablet 10 mg  10 mg Oral Daily Rice Settle, DO   10 mg at 02/09/23 1325    amLODIPine (NORVASC) tablet 2.5 mg  2.5 mg Oral Daily Rice Settle, DO   2.5 mg at 02/09/23 1325    acetaminophen (TYLENOL) tablet 1,300 mg  1,300 mg Oral Q8H PRN Jose May Dayana DO        calcium acetate (PHOSLO) capsule 667 mg  667 mg Oral TID  Loc Anne DO   667 mg at 02/09/23 1325    glucose chewable tablet 16 g  4 tablet Oral PRN Jose May Dayana, DO        insulin glargine (LANTUS) injection vial 20 Units  20 Units SubCUTAneous Nightly Loc Anne DO   20 Units at 02/08/23 2009    insulin lispro (HUMALOG) injection vial 4 Units  4 Units SubCUTAneous TID  Loc Anne DO   4 Units at 02/09/23 0616    midodrine (PROAMATINE) tablet 10 mg  10 mg Oral Daily PRN Jose May Dayana DO        pantoprazole (PROTONIX) tablet 40 mg  40 mg Oral BID  Loc Anne DO   40 mg at 02/09/23 0502    glucose chewable tablet 16 g  4 tablet Oral PRN Heather Anne DO        dextrose bolus 10% 125 mL  125 mL IntraVENous PRN Jose May Dayana DO        Or    dextrose bolus 10% 250 mL  250 mL IntraVENous PRN Jose May Dayana, DO        glucagon (rDNA) injection 1 mg  1 mg IntraMUSCular PRN Loc Anne DO        dextrose 10 % infusion   IntraVENous Continuous PRN Maci Adas Mihok, DO        insulin lispro (HUMALOG) injection vial 0-4 Units  0-4 Units SubCUTAneous TID WC Maci Adas Mihok, DO   2 Units at 02/09/23 0618    insulin lispro (HUMALOG) injection vial 0-4 Units  0-4 Units SubCUTAneous Nightly Loc A Mihok, DO        heparin (porcine) injection 5,000 Units  5,000 Units SubCUTAneous Q8H Loc Woodsk, DO   5,000 Units at 02/08/23 1724    acetaminophen (TYLENOL) tablet 650 mg  650 mg Oral Q4H PRN Maci Adas Mihok, DO   650 mg at 02/08/23 2209       Allergies: Allergies   Allergen Reactions    Pcn [Penicillins] Shortness Of Breath and Rash    Benzonatate Other (See Comments)     \"PATIENTS STATES \"IT WAS LIKE I WAS DRUNK. \"    Morphine Itching and Rash    Sodium Hypochlorite Nausea And Vomiting and Rash     AKA BLEACH. RASH FROM SKIN EXPOSURE        Problem List:    Patient Active Problem List   Diagnosis Code    Nausea & vomiting R11.2    Essential hypertension I10    Hyperlipidemia E78.5    Diabetes mellitus type 2 with complications ([facility] Z08.9    Neck pain M54.2    Anemia in CKD (chronic kidney disease) N18.9, D63.1    Pneumonia due to COVID-19 virus U07.1, J12.82    Pulmonary hypertension, unspecified ([facility] I27.20    Obesity, Class III, BMI 40-49.9 (morbid obesity) ([facility] E66.01    History of Clostridioides difficile colitis Z86.19    Anxiety and depression F41.9, F32. A    At high risk for falls Z91.81    Dizziness on standing R42    Acute pulmonary embolism (HCC) I26.99    H/O heart artery stent Z95.5    Pulmonary embolism and infarction (HCC) I26.99    Diabetes mellitus (HCC) E11.9    Hematemesis K92.0    End-stage renal disease on hemodialysis (HCC) N18.6, Z99.2    Leg swelling M79.89    Lymphedema of both lower extremities I89.0    Rectal bleeding K62.5    BRBPR (bright red blood per rectum) K62.5    Acute on chronic heart failure with preserved ejection fraction (HCC) I50.33    Aortic stenosis, mild I35.0    Anemia due to stage 5 chronic kidney disease (HCC) N18.5, D63.1    History of pulmonary embolus (PE) Z86.711    S/P insertion of inferior vena caval filter Z95.828       Past Medical History:        Diagnosis Date    Acute on chronic heart failure with preserved ejection fraction ([address] 2/7/2023    Acute pulmonary embolism ([address] 12/03/2022    Anemia due to stage 5 chronic kidney disease, not on chronic dialysis ([address] 2/8/2023    Anemia in CKD (chronic kidney disease) 11/30/2021    Anxiety and depression 01/19/2022    Aortic stenosis, mild 2/8/2023 12/2022    At high risk for falls 01/19/2022    Brain aneurysm     CLABSI (central line-associated bloodstream infection) 11/19/2021    Cough 01/19/2022    Diabetes mellitus ([address] 2006    Diabetes mellitus type 2 with complications ([address] 97/45/8665    Dizziness on standing 01/19/2022    End-stage renal disease on hemodialysis ([address] 01/19/2023    ESRD (end stage renal disease) ([address] 11/19/2021    ESRD on hemodialysis ([address] 11/19/2021    Essential hypertension 11/30/2021    Fever, unspecified     Gastrointestinal hemorrhage 11/30/2021    H/O heart artery stent 2015    Done [address] History of Clostridioides difficile colitis 01/19/2022    History of pulmonary embolus (PE) 2/8/2023 12/2022    Hyperlipidemia     Hypertension     Leg swelling 01/19/2023    Lymphedema of both lower extremities 01/19/2023    MSSA bacteremia 11/18/2021    Nausea & vomiting 11/18/2021    Obesity, Class III, BMI 40-49.9 (morbid obesity) ([address] 77/67/3767    Periumbilical abdominal pain     S/P insertion of inferior vena caval filter 01/20/2023       Past Surgical History:        Procedure Laterality Date    CARDIAC CATHETERIZATION  2015    Done in [address]      COLONOSCOPY  2017    Negative findings    DIALYSIS FISTULA CREATION Left 01/28/2022    REVISION AV FISTULA LEFT ARM performed by Kira Vazquez, MD at [address] IVC FILTER INSERTION  2023    DVT and pulmonary emboli, bleeding duodenal ulcer    PTCA  2015    PTCA [facility]    UPPER GASTROINTESTINAL ENDOSCOPY N/A 2021    EGD BIOPSY performed by Marija Webster MD at [address] 2023    EGD BIOPSY performed by Marija Webster MD at [facility] N/A 2021    CATHETER INSERTION  TUNNELED HEMODIALYSIS, WITH REMOVAL OF TEMPORARY CATHETER performed by Naty Morse MD at [facility] History:    Social History     Tobacco Use    Smoking status: Former     Packs/day: 1.00     Years: 12.00     Pack years: 12.00     Types: Cigarettes     Start date:      Quit date:      Years since quittin.1    Smokeless tobacco: Never   Substance Use Topics    Alcohol use: Never                                Counseling given: Not Answered      Vital Signs (Current):   Vitals:    23 1200 23 1230 23 1245 23 1315   BP: (!) 115/55 (!) 113/29 (!) 146/65 (!) 122/44   Pulse: 67 78 64 61   Resp:   18 18   Temp:   36.6 °C (97.8 °F) 36.4 °C (97.5 °F)   TempSrc:    Oral   SpO2:    96%   Weight:   252 lb 13.9 oz (114.7 kg)    Height:                                                  BP Readings from Last 3 Encounters:   23 (!) 122/44   23 (!) 142/57   23 138/64       NPO Status:  >8 hrs                                                                               BMI:   Wt Readings from Last 3 Encounters:   23 252 lb 13.9 oz (114.7 kg)   23 260 lb 5.8 oz (118.1 kg)   23 262 lb (118.8 kg)     Body mass index is 43.4 kg/m².     CBC:   Lab Results   Component Value Date/Time    WBC 7.3 2023 04:30 AM    RBC 3.09 2023 04:30 AM    HGB 9.5 2023 04:30 AM    HCT 31.1 2023 04:30 AM    .6 2023 04:30 AM    RDW 14.8 2023 04:30 AM     2023 04:30 AM       CMP:   Lab Results   Component Value Date/Time     02/09/2023 04:30 AM    K 3.8 02/09/2023 04:30 AM    K 4.2 08/23/2022 12:12 PM    CL 94 02/09/2023 04:30 AM    CO2 29 02/09/2023 04:30 AM    BUN 18 02/09/2023 04:30 AM    CREATININE 4.1 02/09/2023 04:30 AM    GFRAA 8 09/22/2022 12:14 PM    LABGLOM 11 02/09/2023 04:30 AM    GLUCOSE 259 02/09/2023 04:30 AM    PROT 6.8 02/08/2023 08:50 AM    CALCIUM 8.2 02/09/2023 04:30 AM    BILITOT 0.4 02/08/2023 08:50 AM    ALKPHOS 89 02/08/2023 08:50 AM    AST 16 02/08/2023 08:50 AM    ALT 50 02/08/2023 08:50 AM       POC Tests: No results for input(s): POCGLU, POCNA, POCK, POCCL, POCBUN, POCHEMO, POCHCT in the last 72 hours.     Coags:   Lab Results   Component Value Date/Time    PROTIME 13.0 02/08/2023 10:30 AM    INR 1.2 02/08/2023 10:30 AM    APTT 34.5 01/17/2023 08:35 AM       HCG (If Applicable): No results found for: PREGTESTUR, PREGSERUM, HCG, HCGQUANT     ABGs: No results found for: PHART, PO2ART, UFV4UFW, CYF3EFX, BEART, A3XFBBOW     Type & Screen (If Applicable):  No results found for: LABABO, LABRH    Drug/Infectious Status (If Applicable):  No results found for: HIV, HEPCAB    COVID-19 Screening (If Applicable):   Lab Results   Component Value Date/Time    COVID19 Not Detected 02/07/2023 06:35 PM           Anesthesia Evaluation  Patient summary reviewed and Nursing notes reviewed no history of anesthetic complications:   Airway: Mallampati: III  TM distance: >3 FB   Neck ROM: full  Mouth opening: > = 3 FB   Dental:          Pulmonary:   (+) pneumonia: resolved,  decreased breath sounds     (-) not a current smoker                          ROS comment: 2L oxygen baseline   Cardiovascular:  Exercise tolerance: poor (<4 METS),   (+) hypertension:, valvular problems/murmurs: AS, CABG/stent (stents x3): no interval change, CHF: no interval change, pulmonary hypertension:, hyperlipidemia      ECG reviewed  Rhythm: regular  Rate: normal  Echocardiogram reviewed Neuro/Psych:   (+) TIA (per patient, TIA in 2015; no residual symptoms), psychiatric history:depression/anxiety             GI/Hepatic/Renal:   (+) GERD: poorly controlled, renal disease: ESRD,           Endo/Other:    (+) DiabetesType II DM, using insulin, blood dyscrasia: anemia:., .                 Abdominal:             Vascular:   + PE.        ROS comment: Current PE. Other Findings:           Anesthesia Plan      MAC     ASA 4       Induction: intravenous. Anesthetic plan and risks discussed with patient. Plan discussed with attending.                     Su Kirk RN   2/9/2023

## 2023-02-09 NOTE — PROGRESS NOTES
Occupational Therapy  Patient treatment attempted this AM.  Patient off the unit for dialysis. Will attempt at a later time.   Holden STUBBS/NINOSKA [id number]

## 2023-02-09 NOTE — CARE COORDINATION
Plan for EGD tomorrow. PT am-pac 20. HD T-T-S @ [address] [facility] [phone] when pt is discharging. Requiring O2 2lNC which is [facility]'s [facility] Plan remains to return home w/ her daughter on discharge. [address]- [facility] order on chart- notify when pt is discharging. Daughter Steven Corey will provide transportation.  Will follow Ranjan Zafar RN case manager

## 2023-02-09 NOTE — PROGRESS NOTES
[facility] MD Yesi Echols, MD Keven Keita, MD Emily Melgar, CASH Echavarria, YOSHI  Progress Note    2/9/2023    SUBJECTIVE:   2/9:  Seen while on dialysis. Feels fatigued. Denies chest pain or SOB. Oxygen on 2L/NC. Appetite is OK. Moving bowels OK. PROBLEM LIST:    Principal Problem:    Acute on chronic heart failure with preserved ejection fraction (HCC)  Active Problems:    H/O heart artery stent    Diabetes mellitus (HCC)    End-stage renal disease on hemodialysis (HCC)    Lymphedema of both lower extremities    Anemia due to stage 5 chronic kidney disease (HCC)    S/P insertion of inferior vena caval filter    Essential hypertension    Obesity, Class III, BMI 40-49.9 (morbid obesity) ([address]    Aortic stenosis, mild    History of pulmonary embolus (PE)  Resolved Problems:    * No resolved hospital problems. *         DIET:    ADULT DIET; Regular; 4 carb choices (60 gm/meal);  Low Fat/Low Chol/High Fiber/CHELSEA  Diet NPO     MEDS (scheduled):    atorvastatin  10 mg Oral Daily    amLODIPine  2.5 mg Oral Daily    calcium acetate  667 mg Oral TID WC    insulin glargine  20 Units SubCUTAneous Nightly    insulin lispro  4 Units SubCUTAneous TID WC    pantoprazole  40 mg Oral BID AC    insulin lispro  0-4 Units SubCUTAneous TID WC    insulin lispro  0-4 Units SubCUTAneous Nightly    heparin (porcine)  5,000 Units SubCUTAneous Q8H       MEDS (infusions):   dextrose         MEDS (prn):  acetaminophen, glucose, midodrine, glucose, dextrose bolus **OR** dextrose bolus, glucagon (rDNA), dextrose, acetaminophen    PHYSICAL EXAM:     Patient Vitals for the past 24 hrs:   BP Temp Temp src Pulse Resp SpO2 Weight   02/09/23 1315 (!) 122/44 97.5 °F (36.4 °C) Oral 61 18 96 % --   02/09/23 1245 (!) 146/65 97.8 °F (36.6 °C) -- 64 18 -- 252 lb 13.9 oz (114.7 kg)   02/09/23 1230 (!) 113/29 -- -- 78 -- -- --   02/09/23 1200 (!) 115/55 -- -- 67 -- -- -- 02/09/23 1130 (!) 120/54 -- -- 66 -- -- --   02/09/23 1100 (!) 104/56 -- -- 66 -- -- --   02/09/23 1030 127/63 -- -- 65 -- -- --   02/09/23 1000 (!) 141/59 -- -- 66 -- -- --   02/09/23 0930 (!) 161/66 -- -- 64 -- -- --   02/09/23 0900 (!) 175/61 -- -- 69 -- -- --   02/09/23 0836 (!) 144/70 -- -- 70 -- -- --   02/09/23 0815 (!) 144/70 97.5 °F (36.4 °C) -- 70 18 -- 258 lb 13.1 oz (117.4 kg)   02/09/23 0745 (!) 117/49 97.9 °F (36.6 °C) Oral 67 18 100 % --   02/09/23 0500 (!) 113/48 -- -- -- -- -- --   02/09/23 0200 (!) 93/46 98.2 °F (36.8 °C) Oral 70 18 95 % --   02/08/23 2000 113/61 98.3 °F (36.8 °C) Oral 70 18 97 % --   02/08/23 1713 (!) 144/62 97.5 °F (36.4 °C) Oral 71 18 99 % --   02/08/23 1640 (!) 116/50 98 °F (36.7 °C) -- 63 18 -- 251 lb 5.2 oz (114 kg)   02/08/23 1600 (!) 98/47 -- -- 61 -- -- --   02/08/23 1530 99/61 -- -- 65 -- -- --   02/08/23 1500 (!) 105/59 -- -- 63 -- -- --   02/08/23 1430 (!) 127/55 -- -- 63 -- -- --   @      Intake/Output Summary (Last 24 hours) at 2/9/2023 1411  Last data filed at 2/9/2023 1245  Gross per 24 hour   Intake 600 ml   Output 6300 ml   Net -5700 ml         Wt Readings from Last 3 Encounters:   02/09/23 252 lb 13.9 oz (114.7 kg)   01/31/23 260 lb 5.8 oz (118.1 kg)   01/30/23 262 lb (118.8 kg)       Constitutional:  in no acute distress  HEENT: NC/AT, EOMI, sclera and conjunctiva are clear and anicteric, mucus membranes moist  Neck: Trachea midline, no JVD  Cardiovascular: S1, S2 regular rhythm, no murmur,or rub  Respiratory:  Diminished. No crackles, no wheeze  Gastrointestinal:  Soft, nontender, nondistended, NABS  Ext: 2+ BLE edema (L>R).   BUE edema 2+  Skin:  Skin on legs erythemic- dry   Neuro: awake, alert, interactive      DATA:    Recent Labs     02/07/23  1156 02/08/23  1030 02/09/23  0430   WBC 9.9 7.0 7.3   HGB 9.2* 8.6* 9.5*   HCT 28.6* 27.9* 31.1*   MCV 96.6 100.4* 100.6*    194 176     Recent Labs     02/07/23  1156 02/07/23  1255 02/08/23  0850 02/09/23  0430     --  137 138   K 4.5  --  4.2 3.8   CL 94*  --  96* 94*   CO2 25  --  26 29   MG 1.9 1.9 2.0 2.1   PHOS  --   --  4.4 3.7   BUN 15  --  23 18   CREATININE 3.5*  --  5.1* 4.1*   ALT  --   --  50*  --    AST  --   --  16  --    BILIDIR  --   --  <0.2  --    BILITOT  --   --  0.4  --    ALKPHOS  --   --  89  --        No results found for: LABPROT    ASSESSMENT :   End Stage Renal Disease due to diabetic Nephropathy and Renal Microvascular Atherosclerotic Disease  Anemia due to CKD  Essential Hypertension  Secondary Hyperparathyroidism  Pleural effusion  PE- S/P IVC filter (was on eliquis but held d/t GIB  Right sided heart failure       Removed 2.7L on dialysis treatment today.   Tolerated well  CXR today suggestive of CHF and moderate size right pleural effusion  -refusing thoracentesis at this time but will consent if it is not improving  -BP stable  -EGD in AM    PLAN:  Continue IHD for removal of solutes and volume on a TTS schedule  Schedule for 3 hour UFWOD treatment tomorrow  Will evaluate for additional treatments on a daily basis-   Monitor labs  Monitor I & O  Continue current renal care       Electronically signed by TAWNYA Chin CNP on 2/9/2023 at 2:11 PM

## 2023-02-09 NOTE — PROGRESS NOTES
Patient refusing waffle cushion for the chair. Educated on the importance of using it.  Lizette Mederos RN

## 2023-02-09 NOTE — DISCHARGE INSTRUCTIONS
HEART FAILURE  / CONGESTIVE HEART FAILURE  DISCHARGE INSTRUCTIONS:  GUIDELINES TO FOLLOW AT HOME    Self- Managed Care:     MEDICATIONS:  Take your medication as directed. If you are experiencing any side effects, inform your doctor, Do not stop taking any of your medications without letting your doctor know. Check with your doctor before taking any over-the-counter medications / herbal / or dietary supplements. They may interfere with your other medications. Do not take ibuprofen (Advil or Motrin) and naproxen (Aleve) without talking to your doctor first. They could make your heart failure worse. WEIGHT MONITORING:   Weigh yourself everyday (with the same scale) around the same time of the day and write it down. (you can chart them on a calendar or keep track of them on paper. Notify your doctor of a weight gain of 3 pounds or more in 1 day   OR a total of 5 pounds or more in 1 week    Take your weight record to your doctor visits  Also, the same goes if you loose more than 3# in one day, let your heart doctor know. DIET:   Cardiac heart healthy diet- Low saturated / low trans fat, no added salt, caffeine restricted, Low sodium diet-   No more than 2,000mg (2 grams) of salt / sodium per day (which equals to a little less than  a teaspoon of salt)  If your doctor wants you on a fluid restriction. ..it is usually recommended a fluid limit of 2,000cc -  Fluid restriction- 2,000 ml (milliliters) = 64 ounces = you can have 8 glasses of fluid per day (each glass 8 ounces)    Follow a low salt diet - avoid using salt at the table, avoid / limit use of canned soups, processed / packaged foods, salted snacks, olives and pickles. Do not use a salt substitute without checking with your doctor, they may contain a high amount of potassioum. (Mrs. Jean Claude Gee is safe to use).     Limit the use of alcohol       CALL YOUR DOCTOR THE FIRST DAY YOU NOTICE ANY OF THESE   SYMPTOMS:  You have a weight gain of 3 pounds or more in 1 day         OR 5 pounds or more in one week  More shortness of breath  More swelling of your stomach, legs, ankles or feet  Feeling more tired, No energy  Dry hacky cough  Dizziness  More chest pain / discomfort       (CALL 911 IF ANY OF THE FOLLOWING OCCURS  Chest pain (not relieved with nitroglycerine, if you have been prescribed this medication)  Severe shortness of breath  Faint / Pass out  Confusion / cannot think clearly  If symptoms get worse           SMOKING - TOBACCO USE:  * IF YOU SMOKE - STOP! Kick the habit. [address] Program is offered at [facility] and [address] [facility]. Call [phone] extension [phone] for more information. ACTIVITY:   (Ask your doctor when you will be able to return to work and before starting any exercise program.  Do not drive unless unless your doctor has given you permission to do so). Start light exercise. Even if you can only do a small amount, exercise will help you get stronger, have more energy, help manage your weight and decrease  stress. Walking is an easy way to get exercise. Start out slowly and  increase the amount you walk as tolerated  If you become short of breath, dizzy or have chest pain; stop, sit down, and rest.  If you feel \"wiped out\" the day after you exercise, walk at a slower pace or for a shorter distance. You can gradually increase the pace or amount of time. (Do not exercise right after a meal or in extreme temperatures, such as above 85 degrees, if the air is really humid, or wind chill is less than 20 degrees)                                             ADDITIONAL INFORMATION:  Avoid getting sick from colds and the flu. Stay away from friends or family that you know may have a contagious illness  Get plenty of rest   Get a flu shot each year. Get a pneumococcal vaccine shot.  If you have had one before, ask your doctor whether you need another dose. My Goal for Self-management of Heart Failure Includes 5 steps :    1. Notice a change in symptoms ( weight gain, short of breath, leg swelling, decreased activity level, bloating. ...)    2. Evaluate the change: (use the Heart Failure Zones )     3. Decide to take action: decide what your options are, such as: (call your doctor for an extra visit, take a prescribed medication, such as your water pill if your doctor has given you directions to do so, [address])    4. Come up with a strategy:  (now you call the doctor for advice / appointment. This is where you take action!!! Do not wait, catch the symptom early and treat it before it worsens. 5. Evaluate the response: The next day, check your Heart Failure Zones: are you in the GREEN ZONE (safe zone)? Worsening symptoms of YELLOW ZONE? Or have you moved to the RED ZONE and need to call 911 or go to the Emergency Room for evaluation? Call your doctor's office to update them on your symptoms of heart failure. Follow-up nephrology, pulmonary per their instructions.

## 2023-02-09 NOTE — FLOWSHEET NOTE
02/09/23 1245   Vital Signs   BP (!) 146/65   Temp 97.8 °F (36.6 °C)   Heart Rate 64   Resp 18   Weight 252 lb 13.9 oz (114.7 kg)   Weight Method Stated   Percent Weight Change -2.3   Post-Hemodialysis Assessment   Post-Treatment Procedures Blood returned; Access bleeding time < 10 minutes   Machine Disinfection Process Exterior Machine Disinfection   Rinseback Volume (ml) 300 ml   Blood Volume Processed (Liters) 99.4 l/min   Dialyzer Clearance Moderately streaked   Duration of Treatment (minutes) 240 minutes   Hemodialysis Intake (ml) 300 ml   Hemodialysis Output (ml) 3000 ml   NET Removed (ml) 2700   Tolerated Treatment Good   Patient Response to Treatment pt tolertaed tx well 2700mL removed bruit/thrill present nurse to nurse report given to floor nurse pt/vitals stable upon d/c   Bilateral Breath Sounds Diminished   Edema Generalized;Right upper extremity; Left upper extremity;Right lower extremity; Left lower extremity   Edema Generalized +2   RUE Edema +2   LUE Edema +2   RLE Edema +2   LLE Edema +2   Time Off 1236   Patient Disposition Return to room

## 2023-02-09 NOTE — PROGRESS NOTES
Physical Therapy  Facility/Department: [address] INTERNAL MEDICINE 2  Daily Treatment Note  NAME: Kaleb You  : 1955  MRN: [id number]    Date of Service: 2023    Patient Diagnosis(es): The primary encounter diagnosis was Acute respiratory failure with hypoxia ([facility] [address] Diagnoses of Shortness of breath, ESRD (end stage renal disease) on dialysis [facility]), and Single subsegmental pulmonary embolism without acute cor pulmonale (HCC) were also pertinent to this visit. Evaluating Therapist: Terrilee Goldberg PT     Room #:  8494/2673-U  Diagnosis:  Shortness of breath [R06.02]  ESRD (end stage renal disease) on dialysis ([facility] [address] [N18.6, Z99.2]  Acute respiratory failure with hypoxia ([facility] [address] [J96.01]  Acute on chronic diastolic CHF (congestive heart failure) ([facility] [address] [I50.33]  Single subsegmental pulmonary embolism without acute cor pulmonale (HCC) [I26.93]  PMHx/PSHx:  CKD, DM, HTn  Precautions:  falls, O2        Social:  Pt lives with daughter in a 1 floor plan 1 small steps  to enter. Prior to admission ambulates with ww, uses home O2 daughter assists as needed. Sleeps in recliner chair       Initial Evaluation  Date: 23 Treatment      Short Term/ Long Term   Goals   Was pt agreeable to Eval/treatment? yes  yes     Does pt have pain? No c/o pain  none reported. Pt reported LE weakness     Bed Mobility  Rolling: independent  Supine to sit: independent  Sit to supine: NT  Scooting: independent  NT - pt found sitting up in the chair.  independent   Transfers Sit to stand: SBA  Stand to sit: SBA  Stand pivot: SBA Sit to stand:  SBA  Stand to sit:  SBA  Stand pivot:  NT independent   Ambulation    50 feet with ww with SBA  30 feet with w/w SBA 50 feet with ww independent   Stair Negotiation  Ascended and descended  NT  NT      LE strength     4-/5     4/5   balance      Good with ww       AM-PAC Raw score               24             Vitals:  SPO2 dropped as low as 85% on 2L and recovered to 945 with seated rest.     Patient education  Pt educated on PT objectives during treatment session, deep breathing. Patient response to education:   Pt verbalized understanding Pt demonstrated skill Pt requires further education in this area   yes With cueing yes     ASSESSMENT:    Comments:  Pt found and left sitting up in the chair with call light in reach. Treatment:  Patient practiced and was instructed in the following treatment:   Functional mobility performed as documented above. No report of dizziness during functional mobility. Pt reported feeling fatigued after having dialysis three days in a row. No LOB during ambulation. Ambulation distance limited due to LW weakness. PLAN:    Patient is making good progress towards established goals. Will continue with current POC.      Time in  1400  Time out  1413    Total Treatment Time  13 minutes     CPT codes:    [x] Therapeutic activities 70997 13 minutes  [] Therapeutic exercises 80356  minutes      Juan Alberto Hernandez, [phone]

## 2023-02-10 ENCOUNTER — ANESTHESIA (OUTPATIENT)
Dept: ENDOSCOPY | Age: 68
End: 2023-02-10
Payer: MEDICARE

## 2023-02-10 LAB
ANION GAP SERPL CALCULATED.3IONS-SCNC: 13 MMOL/L (ref 7–16)
BASOPHILS ABSOLUTE: 0.04 E9/L (ref 0–0.2)
BASOPHILS RELATIVE PERCENT: 0.5 % (ref 0–2)
BUN BLDV-MCNC: 16 MG/DL (ref 6–23)
CALCIUM SERPL-MCNC: 8.8 MG/DL (ref 8.6–10.2)
CHLORIDE BLD-SCNC: 92 MMOL/L (ref 98–107)
CO2: 31 MMOL/L (ref 22–29)
CREAT SERPL-MCNC: 3.8 MG/DL (ref 0.5–1)
EOSINOPHILS ABSOLUTE: 0.44 E9/L (ref 0.05–0.5)
EOSINOPHILS RELATIVE PERCENT: 5.6 % (ref 0–6)
GFR SERPL CREATININE-BSD FRML MDRD: 12 ML/MIN/1.73
GLUCOSE BLD-MCNC: 199 MG/DL (ref 74–99)
HCT VFR BLD CALC: 30.7 % (ref 34–48)
HEMOGLOBIN: 9.7 G/DL (ref 11.5–15.5)
IMMATURE GRANULOCYTES #: 0.05 E9/L
IMMATURE GRANULOCYTES %: 0.6 % (ref 0–5)
LACTIC ACID, SEPSIS: 1.6 MMOL/L (ref 0.5–1.9)
LYMPHOCYTES ABSOLUTE: 1.4 E9/L (ref 1.5–4)
LYMPHOCYTES RELATIVE PERCENT: 17.9 % (ref 20–42)
MAGNESIUM: 1.8 MG/DL (ref 1.6–2.6)
MCH RBC QN AUTO: 31 PG (ref 26–35)
MCHC RBC AUTO-ENTMCNC: 31.6 % (ref 32–34.5)
MCV RBC AUTO: 98.1 FL (ref 80–99.9)
METER GLUCOSE: 180 MG/DL (ref 74–99)
METER GLUCOSE: 215 MG/DL (ref 74–99)
METER GLUCOSE: 222 MG/DL (ref 74–99)
METER GLUCOSE: 238 MG/DL (ref 74–99)
MONOCYTES ABSOLUTE: 0.75 E9/L (ref 0.1–0.95)
MONOCYTES RELATIVE PERCENT: 9.6 % (ref 2–12)
NEUTROPHILS ABSOLUTE: 5.15 E9/L (ref 1.8–7.3)
NEUTROPHILS RELATIVE PERCENT: 65.8 % (ref 43–80)
PDW BLD-RTO: 14.6 FL (ref 11.5–15)
PHOSPHORUS: 2.6 MG/DL (ref 2.5–4.5)
PLATELET # BLD: 189 E9/L (ref 130–450)
PMV BLD AUTO: 10.2 FL (ref 7–12)
POTASSIUM SERPL-SCNC: 4.1 MMOL/L (ref 3.5–5)
RBC # BLD: 3.13 E12/L (ref 3.5–5.5)
SODIUM BLD-SCNC: 136 MMOL/L (ref 132–146)
WBC # BLD: 7.8 E9/L (ref 4.5–11.5)

## 2023-02-10 PROCEDURE — 6360000002 HC RX W HCPCS: Performed by: INTERNAL MEDICINE

## 2023-02-10 PROCEDURE — 82962 GLUCOSE BLOOD TEST: CPT

## 2023-02-10 PROCEDURE — 2060000000 HC ICU INTERMEDIATE R&B

## 2023-02-10 PROCEDURE — 2700000000 HC OXYGEN THERAPY PER DAY

## 2023-02-10 PROCEDURE — 83735 ASSAY OF MAGNESIUM: CPT

## 2023-02-10 PROCEDURE — 3609017100 HC EGD: Performed by: INTERNAL MEDICINE

## 2023-02-10 PROCEDURE — 2709999900 HC NON-CHARGEABLE SUPPLY: Performed by: INTERNAL MEDICINE

## 2023-02-10 PROCEDURE — 7100000011 HC PHASE II RECOVERY - ADDTL 15 MIN: Performed by: INTERNAL MEDICINE

## 2023-02-10 PROCEDURE — 3700000000 HC ANESTHESIA ATTENDED CARE: Performed by: INTERNAL MEDICINE

## 2023-02-10 PROCEDURE — 2580000003 HC RX 258

## 2023-02-10 PROCEDURE — 36415 COLL VENOUS BLD VENIPUNCTURE: CPT

## 2023-02-10 PROCEDURE — 6370000000 HC RX 637 (ALT 250 FOR IP): Performed by: INTERNAL MEDICINE

## 2023-02-10 PROCEDURE — 90945 DIALYSIS ONE EVALUATION: CPT

## 2023-02-10 PROCEDURE — 5A1D70Z PERFORMANCE OF URINARY FILTRATION, INTERMITTENT, LESS THAN 6 HOURS PER DAY: ICD-10-PCS | Performed by: INTERNAL MEDICINE

## 2023-02-10 PROCEDURE — 2500000003 HC RX 250 WO HCPCS: Performed by: INTERNAL MEDICINE

## 2023-02-10 PROCEDURE — 7100000010 HC PHASE II RECOVERY - FIRST 15 MIN: Performed by: INTERNAL MEDICINE

## 2023-02-10 PROCEDURE — 6360000002 HC RX W HCPCS

## 2023-02-10 PROCEDURE — 0DJ08ZZ INSPECTION OF UPPER INTESTINAL TRACT, VIA NATURAL OR ARTIFICIAL OPENING ENDOSCOPIC: ICD-10-PCS | Performed by: INTERNAL MEDICINE

## 2023-02-10 PROCEDURE — 85025 COMPLETE CBC W/AUTO DIFF WBC: CPT

## 2023-02-10 PROCEDURE — 80048 BASIC METABOLIC PNL TOTAL CA: CPT

## 2023-02-10 PROCEDURE — 83605 ASSAY OF LACTIC ACID: CPT

## 2023-02-10 PROCEDURE — 84100 ASSAY OF PHOSPHORUS: CPT

## 2023-02-10 RX ORDER — SODIUM CHLORIDE 9 MG/ML
INJECTION, SOLUTION INTRAVENOUS CONTINUOUS PRN
Status: DISCONTINUED | OUTPATIENT
Start: 2023-02-10 | End: 2023-02-10 | Stop reason: SDUPTHER

## 2023-02-10 RX ORDER — PROPOFOL 10 MG/ML
INJECTION, EMULSION INTRAVENOUS PRN
Status: DISCONTINUED | OUTPATIENT
Start: 2023-02-10 | End: 2023-02-10 | Stop reason: SDUPTHER

## 2023-02-10 RX ORDER — DIPHENHYDRAMINE HYDROCHLORIDE 50 MG/ML
25 INJECTION INTRAMUSCULAR; INTRAVENOUS EVERY 6 HOURS PRN
Status: DISCONTINUED | OUTPATIENT
Start: 2023-02-10 | End: 2023-02-12 | Stop reason: HOSPADM

## 2023-02-10 RX ADMIN — INSULIN LISPRO 4 UNITS: 100 INJECTION, SOLUTION INTRAVENOUS; SUBCUTANEOUS at 16:14

## 2023-02-10 RX ADMIN — CALCIUM ACETATE 667 MG: 667 CAPSULE ORAL at 16:13

## 2023-02-10 RX ADMIN — PANTOPRAZOLE SODIUM 40 MG: 40 TABLET, DELAYED RELEASE ORAL at 16:13

## 2023-02-10 RX ADMIN — DIPHENHYDRAMINE HYDROCHLORIDE 25 MG: 50 INJECTION, SOLUTION INTRAMUSCULAR; INTRAVENOUS at 02:26

## 2023-02-10 RX ADMIN — INSULIN GLARGINE 20 UNITS: 100 INJECTION, SOLUTION SUBCUTANEOUS at 19:54

## 2023-02-10 RX ADMIN — AMLODIPINE BESYLATE 2.5 MG: 2.5 TABLET ORAL at 16:13

## 2023-02-10 RX ADMIN — PROPOFOL 120 MG: 10 INJECTION, EMULSION INTRAVENOUS at 14:31

## 2023-02-10 RX ADMIN — DIPHENHYDRAMINE HYDROCHLORIDE 25 MG: 50 INJECTION, SOLUTION INTRAMUSCULAR; INTRAVENOUS at 23:34

## 2023-02-10 RX ADMIN — INSULIN LISPRO 1 UNITS: 100 INJECTION, SOLUTION INTRAVENOUS; SUBCUTANEOUS at 13:30

## 2023-02-10 RX ADMIN — ATORVASTATIN CALCIUM 10 MG: 10 TABLET, FILM COATED ORAL at 16:13

## 2023-02-10 RX ADMIN — SODIUM CHLORIDE: 9 INJECTION, SOLUTION INTRAVENOUS at 14:25

## 2023-02-10 RX ADMIN — HEPARIN SODIUM 5000 UNITS: 10000 INJECTION INTRAVENOUS; SUBCUTANEOUS at 18:33

## 2023-02-10 ASSESSMENT — PAIN SCALES - GENERAL
PAINLEVEL_OUTOF10: 0

## 2023-02-10 NOTE — CARE COORDINATION
2/10/2023  Social Work Discharge Planning:EGD today. Plan is home with daughter and [facility] who are following. [facility] order is on chart. Pt is at baseline of 2lo2 and uses this via [facility]. Pt goes to dialysis at [phone] them when Pt discharges. Daughter will transport Pt home.  Electronically signed by HILDA Michaels on 2/10/2023 at 9:45 AM

## 2023-02-10 NOTE — PROGRESS NOTES
[facility] MD Rich Strickland MD Ezequiel Peers, MD Ariadne Gomez, CASH Echavarria, YOSHI  Progress Note    2/10/2023    SUBJECTIVE:   2/9:  Seen while on dialysis. Feels fatigued. Denies chest pain or SOB. Oxygen on 2L/NC. Appetite is OK. Moving bowels OK. 2/10 seen on HD tolerating treatment . Stable vitals . PROBLEM LIST:    Principal Problem:    Acute on chronic heart failure with preserved ejection fraction (HCC)  Active Problems:    H/O heart artery stent    Diabetes mellitus (HCC)    End-stage renal disease on hemodialysis (HCC)    Lymphedema of both lower extremities    Anemia due to stage 5 chronic kidney disease (HCC)    S/P insertion of inferior vena caval filter    Essential hypertension    Obesity, Class III, BMI 40-49.9 (morbid obesity) ([address]    Aortic stenosis, mild    History of pulmonary embolus (PE)  Resolved Problems:    * No resolved hospital problems.  *         DIET:    Diet NPO     MEDS (scheduled):    atorvastatin  10 mg Oral Daily    amLODIPine  2.5 mg Oral Daily    calcium acetate  667 mg Oral TID WC    insulin glargine  20 Units SubCUTAneous Nightly    insulin lispro  4 Units SubCUTAneous TID WC    pantoprazole  40 mg Oral BID AC    insulin lispro  0-4 Units SubCUTAneous TID WC    insulin lispro  0-4 Units SubCUTAneous Nightly    heparin (porcine)  5,000 Units SubCUTAneous Q8H       MEDS (infusions):   dextrose         MEDS (prn):  diphenhydrAMINE, acetaminophen, glucose, midodrine, glucose, dextrose bolus **OR** dextrose bolus, glucagon (rDNA), dextrose, acetaminophen    PHYSICAL EXAM:     Patient Vitals for the past 24 hrs:   BP Temp Temp src Pulse Resp SpO2 Weight   02/10/23 0958 (!) 115/47 -- -- 56 -- -- --   02/10/23 0930 123/61 -- -- 62 -- -- --   02/10/23 0859 110/60 -- -- 50 -- -- --   02/10/23 0830 124/63 -- -- 82 -- -- --   02/10/23 0800 (!) 96/45 -- -- 53 -- -- --   02/10/23 0742 108/73 -- -- 60 -- -- --   02/10/23 8183 126/63 98.3 °F (36.8 °C) -- 58 18 -- 252 lb 6.8 oz (114.5 kg)   02/10/23 0730 126/63 98.3 °F (36.8 °C) -- 58 16 -- 252 lb 6.8 oz (114.5 kg)   02/10/23 0200 (!) 120/59 98.3 °F (36.8 °C) Oral 70 18 96 % --   02/09/23 2345 -- -- -- -- -- 93 % --   02/09/23 1930 (!) 106/56 98.3 °F (36.8 °C) Oral 70 18 96 % --   02/09/23 1615 120/73 -- -- 74 -- -- --   02/09/23 1530 89/72 98 °F (36.7 °C) Oral 61 18 93 % --   02/09/23 1315 (!) 122/44 97.5 °F (36.4 °C) Oral 61 18 96 % --   02/09/23 1245 (!) 146/65 97.8 °F (36.6 °C) -- 64 18 -- 252 lb 13.9 oz (114.7 kg)   02/09/23 1230 (!) 113/29 -- -- 78 -- -- --   02/09/23 1200 (!) 115/55 -- -- 67 -- -- --   02/09/23 1130 (!) 120/54 -- -- 66 -- -- --   02/09/23 1100 (!) 104/56 -- -- 66 -- -- --   02/09/23 1030 127/63 -- -- 65 -- -- --     @      Intake/Output Summary (Last 24 hours) at 2/10/2023 1005  Last data filed at 2/9/2023 1245  Gross per 24 hour   Intake 300 ml   Output 3000 ml   Net -2700 ml           Wt Readings from Last 3 Encounters:   02/10/23 252 lb 6.8 oz (114.5 kg)   01/31/23 260 lb 5.8 oz (118.1 kg)   01/30/23 262 lb (118.8 kg)       Constitutional:  in no acute distress  HEENT: NC/AT, EOMI, sclera and conjunctiva are clear and anicteric, mucus membranes moist  Neck: Trachea midline, no JVD  Cardiovascular: S1, S2 regular rhythm, no murmur,or rub  Respiratory:  Diminished. No crackles, no wheeze  Gastrointestinal:  Soft, nontender, nondistended, NABS  Ext: 2+ BLE edema (L>R).   BUE edema 2+  Skin:  Skin on legs erythemic- dry   Neuro: awake, alert, interactive      DATA:    Recent Labs     02/08/23  1030 02/09/23  0430 02/10/23  0410   WBC 7.0 7.3 7.8   HGB 8.6* 9.5* 9.7*   HCT 27.9* 31.1* 30.7*   .4* 100.6* 98.1    176 189       Recent Labs     02/08/23  0850 02/09/23  0430 02/10/23  0410    138 136   K 4.2 3.8 4.1   CL 96* 94* 92*   CO2 26 29 31*   MG 2.0 2.1 1.8   PHOS 4.4 3.7 2.6   BUN 23 18 16   CREATININE 5.1* 4.1* 3.8*   ALT 50*  --   --    AST 16 --   --    BILIDIR <0.2  --   --    BILITOT 0.4  --   --    ALKPHOS 89  --   --          No results found for: LABPROT    ASSESSMENT :   End Stage Renal Disease due to diabetic Nephropathy and Renal Microvascular Atherosclerotic Disease  Anemia due to CKD  Essential Hypertension  Secondary Hyperparathyroidism  Pleural effusion  PE- S/P IVC filter (was on eliquis but held d/t GIB  Right sided heart failure         PLAN:  Continue IHD for removal of solutes and volume on a TTS schedule  Additional UF session today   Monitor labs  Monitor I & O  Continue current renal care       Electronically signed by Jean-Paul Wellington MD on 2/10/2023 at 10:05 AM

## 2023-02-10 NOTE — ANESTHESIA POSTPROCEDURE EVALUATION
Department of Anesthesiology  Postprocedure Note    Patient: Michelle Chambers  MRN: [id number]  YOB: 1955  Date of evaluation: 2/10/2023      Procedure Summary     Date: 02/10/23 Room / Location: [facility] 02 / [facility]    Anesthesia Start: 3321 Anesthesia Stop: 6971    Procedure: EGD ESOPHAGOGASTRODUODENOSCOPY Diagnosis:       Anemia, unspecified type      (Anemia, unspecified type [D64.9])    Surgeons: Nidhi Coates MD Responsible Provider: Basilia Martinez MD    Anesthesia Type: MAC ASA Status: 4          Anesthesia Type: No value filed.     Gloria Phase I:      Gloria Phase II: Gloria Score: 9      Anesthesia Post Evaluation    Patient location during evaluation: PACU  Patient participation: complete - patient participated  Level of consciousness: awake  Pain score: 0  Airway patency: patent  Nausea & Vomiting: no nausea and no vomiting  Complications: no  Cardiovascular status: hemodynamically stable  Respiratory status: acceptable  Hydration status: euvolemic

## 2023-02-10 NOTE — PROGRESS NOTES
PROGRESS NOTE      Patient Presents with/Seen in Consultation For      *Reason for Consult: endoscopy tomorrow  CHIEF COMPLAINT:  shortness of breath  Subjective:     Patient seen Sharmin Tovar in bed, in dialysis. States she is feeling good. No c/o at this time. Tolerating diet. EGD findings reviewed with the patient, all questions answered. No BM today, +flatus. POC reviewed with the patient, all questions answered. Review of Systems  Aside from what was mentioned in the PMH and HPI, essentially unremarkable, all others negative. Objective:     BP (!) 108/58   Pulse 81   Temp 97.2 °F (36.2 °C)   Resp 18   Ht 5' 4\" (1.626 m)   Wt 249 lb 12.5 oz (113.3 kg)   SpO2 96%   BMI 42.87 kg/m²     General appearance: alert, awake, laying in bed in dialysis, and cooperative, obese  Eyes: conjunctiva pale, sclera anicteric. PERRL.   Lungs: clear to auscultation bilaterally  Heart: regular rate and rhythm, no murmur, 2+ pulses; trace edema  Abdomen: soft, non-tender; bowel sounds normal; no masses  Extremities: extremities trace edema  Pulses: 2+ and symmetric  Skin: Skin color, texture, turgor normal.   Neurologic: Grossly normal    diphenhydrAMINE (BENADRYL) injection 25 mg, Q6H PRN  atorvastatin (LIPITOR) tablet 10 mg, Daily  amLODIPine (NORVASC) tablet 2.5 mg, Daily  calcium acetate (PHOSLO) capsule 667 mg, TID WC  glucose chewable tablet 16 g, PRN  insulin glargine (LANTUS) injection vial 20 Units, Nightly  insulin lispro (HUMALOG) injection vial 4 Units, TID WC  midodrine (PROAMATINE) tablet 10 mg, Daily PRN  pantoprazole (PROTONIX) tablet 40 mg, BID AC  glucose chewable tablet 16 g, PRN  dextrose bolus 10% 125 mL, PRN   Or  dextrose bolus 10% 250 mL, PRN  glucagon (rDNA) injection 1 mg, PRN  dextrose 10 % infusion, Continuous PRN  insulin lispro (HUMALOG) injection vial 0-4 Units, TID WC  insulin lispro (HUMALOG) injection vial 0-4 Units, Nightly  heparin (porcine) injection 5,000 Units, Q8H  acetaminophen (TYLENOL) tablet 650 mg, Q4H PRN       Data Review  CBC:   Lab Results   Component Value Date/Time    WBC 7.8 02/10/2023 04:10 AM    RBC 3.13 02/10/2023 04:10 AM    HGB 9.7 02/10/2023 04:10 AM    HCT 30.7 02/10/2023 04:10 AM    MCV 98.1 02/10/2023 04:10 AM    MCH 31.0 02/10/2023 04:10 AM    MCHC 31.6 02/10/2023 04:10 AM    RDW 14.6 02/10/2023 04:10 AM     02/10/2023 04:10 AM    MPV 10.2 02/10/2023 04:10 AM     CMP:    Lab Results   Component Value Date/Time     02/10/2023 04:10 AM    K 4.1 02/10/2023 04:10 AM    K 4.2 08/23/2022 12:12 PM    CL 92 02/10/2023 04:10 AM    CO2 31 02/10/2023 04:10 AM    BUN 16 02/10/2023 04:10 AM    CREATININE 3.8 02/10/2023 04:10 AM    GFRAA 8 09/22/2022 12:14 PM    LABGLOM 12 02/10/2023 04:10 AM    GLUCOSE 199 02/10/2023 04:10 AM    PROT 6.8 02/08/2023 08:50 AM    LABALBU 3.4 02/08/2023 08:50 AM    CALCIUM 8.8 02/10/2023 04:10 AM    BILITOT 0.4 02/08/2023 08:50 AM    ALKPHOS 89 02/08/2023 08:50 AM    AST 16 02/08/2023 08:50 AM    ALT 50 02/08/2023 08:50 AM     Hepatic Function Panel:    Lab Results   Component Value Date/Time    ALKPHOS 89 02/08/2023 08:50 AM    ALT 50 02/08/2023 08:50 AM    AST 16 02/08/2023 08:50 AM    PROT 6.8 02/08/2023 08:50 AM    BILITOT 0.4 02/08/2023 08:50 AM    BILIDIR <0.2 02/08/2023 08:50 AM    IBILI see below 02/08/2023 08:50 AM    LABALBU 3.4 02/08/2023 08:50 AM     No components found for: CHLPL    Lab Results   Component Value Date    TRIG 148 02/08/2023    TRIG 143 01/31/2023    TRIG 173 (H) 01/18/2023       Lab Results   Component Value Date    HDL 43 02/08/2023    HDL 43 01/31/2023    HDL 41 01/18/2023       Lab Results   Component Value Date    LDLCALC 78 02/08/2023    LDLCALC 100 (H) 01/31/2023    LDLCALC 79 01/18/2023       Lab Results   Component Value Date    LABVLDL 30 02/08/2023    LABVLDL 29 01/31/2023    LABVLDL 35 01/18/2023      PT/INR:    Lab Results   Component Value Date/Time    PROTIME 13.0 02/08/2023 10:30 AM    INR 1.2 02/08/2023 10:30 AM     IRON:    Lab Results   Component Value Date/Time    IRON 45 01/31/2023 06:35 AM     Iron Saturation:  No components found for: PERCENTFE  FERRITIN:    Lab Results   Component Value Date/Time    FERRITIN 1,357 01/31/2023 06:35 AM         Assessment:     Active Problems:  SOB  Anemia, normocytic  ESRD on hemodialysis   PE- was on Eliquis- on hold recently d/t GIB  DM  EGD 1/18/23-Grade B LA classification GERD and gastritis, minimal, H. pylori negative. Duodenum showed severe duodenitis with superficial ulceration. Biopsies were done, pending.   S/p IVC filter 1/20/23  Pleural effusions  R HF  Pneumonia   EGD 2/10/23:Esophagus:  GERD Stomach:  Gastritis Duodenum:  DUODENITIS    Plan:     Change Protonix to PO daily  Diet as tolerated today  Pulmonary & Cardiology following patient  Supportive care  Medical management per Primary, including pain management   Trend labs  OP colonoscopy too follow; already scheduled  OK to DC from GI POV; when OK with others      Discussed with Dr. Raegan Duran per Dr. David Scott, APRN - CNP  2/11/2023  10:00 AM For Dr. Kory Humphrey

## 2023-02-10 NOTE — PROGRESS NOTES
PROGRESS  NOTE --                                                          INTERNAL  MEDICINE                                                                              I  PERSONALLY SAW , EXAMINED, AND CARED Shanique 124, 2/10/2023     LABS, XRAY ,CHART, AND MEDICATIONS  REVIEWED BY ME       Chief complaint: Short of breath      2/9/2023-SUBJECTIVE: Parrish Wright is alert awake and cooperative; oriented ×3. Denies any chest pain nausea emesis. Tolerating diet. No abdominal pain. Sitting in bedside chair; only real complaint is fatigue. I discussed with her results on chest x-ray; she states it was done before today's dialysis. Afebrile last 24 hours. Pulse 69 blood pressure 175/61. SPO2 100 on 2 L nasal cannula. Intake and output -2520 cc. BUN 18 creatinine 4.1. Fasting glucose 259 calcium 8.2. Ionized calcium done yesterday low at 1.09. LDL from yesterday 78; proBNP slightly improved 45,687. A1c 10.3. TSH 2.970. Hemoglobin 9.5 WBC 7.3. Molecular/PCR viral respiratory panel all not detected. Blood cultures pending. Chest x-ray from today pending. Hemodialysis performed yesterday with net removed 3000 cc. Pulmonary note from today appreciated. Feeling some better, hold off on thoracentesis EF further hemodialysis will help control and less of the effusion. Cardiology note from today appreciated; patient quite fatigued while on dialysis. Still short of breath with exertion. She believes she is having side effects from rosuvastatin and would prefer atorvastatin. Start low-dose amlodipine reviewed previous 2D echo determine right ventricular dynamics. Hematology note from today, continue hemodialysis. GI consult from 2/8 appreciated; consider resuming apixaban if ulcerations have healed.  note from today, will return home with home health care. GI note from today EGD planned in a.m. Patient underwent dialysis this morning with net removal of 2700 cc. Nephrology note from today appreciated. Refusing thoracentesis will consider if effusion is not improving. Scheduled for 3-hour UF WMD treatment tomorrow; continue IHD for removal of solute and volume. Physical therapy AMPAC score from today 20/24. SPO2 dropped to 85% on 2 L recovered when seated with rest.  Chest x-ray from today with following results--    FINDINGS:   Heart is mildly enlarged and there is borderline prominence of the pulmonary   vascularity. There is a moderate size right pleural effusion unchanged. Remainder of the lungs and pleural spaces are normal.       Impression:       Findings suggest some degree of CHF, unchanged        2/10/2023-patient seen in recovery after EGD performed. She is awake alert oriented x3. She is impressed because she is now able to flex her right knee which she had not been able to do since admission due to the edema. She has resigned herself to the fact she will be here over the weekend, pulmonary suggested repeat chest x-ray in 48 hours, if no improvement of pleural effusion then thoracentesis. Still short of breath with activity; no chest pain tolerating dialysis without difficulty. Brief op note following EGD with findings of GERD in the esophagus; gastritis in the stomach; duodenitis in the duodenum. I discussed with her at length, since she has no evidence of DVT in lower extremities, since she has IVC filter I would be reluctant to restart apixaban at this time in view of ongoing duodenitis. She agrees. She is not sure why she had such itching problems last evening, she thinks it might be from the shakes. She still having some itching at this time; she questions if amlodipine may be the culprit. I would think not. Afebrile last 24 hours. Blood pressure 110/60. SPO2 96 on 2 L nasal cannula. Intake and output -5220 cc.   BUN 16 creatinine 3.8 fasting glucose 199 lactic acid 1.6 magnesium 1.8 hemoglobin 9.7 WBC 7.8. Stool for occult blood negative. Patient was experiencing significant pruritus of her arms back and neck; no hives noted by nursing. Patient was given diphenhydramine 25 mg IV one-time. GI note today, scheduled for EGD today. Social service note, plan is home with home health care, [facility] home health care following. Pulmonary note from today, no lung medications repeat chest x-ray over the weekend to decide on IR thoracentesis next week; large body habitus may be too difficult to do at bedside. Patient underwent hemodialysis this a.m. with net removal of 1200 cc.         Objective:     PHYSICAL EXAM:    VS: /60   Pulse 50   Temp 98.3 °F (36.8 °C)   Resp 18   Ht 5' 4\" (1.626 m)   Wt 252 lb 6.8 oz (114.5 kg)   SpO2 96%   BMI 43.33 kg/m²     Labs:   CBC:   Lab Results   Component Value Date/Time    WBC 7.8 02/10/2023 04:10 AM    RBC 3.13 02/10/2023 04:10 AM    HGB 9.7 02/10/2023 04:10 AM    HCT 30.7 02/10/2023 04:10 AM    MCV 98.1 02/10/2023 04:10 AM    MCH 31.0 02/10/2023 04:10 AM    MCHC 31.6 02/10/2023 04:10 AM    RDW 14.6 02/10/2023 04:10 AM     02/10/2023 04:10 AM    MPV 10.2 02/10/2023 04:10 AM     CBC with Differential:    Lab Results   Component Value Date/Time    WBC 7.8 02/10/2023 04:10 AM    RBC 3.13 02/10/2023 04:10 AM    HGB 9.7 02/10/2023 04:10 AM    HCT 30.7 02/10/2023 04:10 AM     02/10/2023 04:10 AM    MCV 98.1 02/10/2023 04:10 AM    MCH 31.0 02/10/2023 04:10 AM    MCHC 31.6 02/10/2023 04:10 AM    RDW 14.6 02/10/2023 04:10 AM    NRBC 0.0 01/13/2022 04:38 AM    LYMPHOPCT 17.9 02/10/2023 04:10 AM    MONOPCT 9.6 02/10/2023 04:10 AM    BASOPCT 0.5 02/10/2023 04:10 AM    MONOSABS 0.75 02/10/2023 04:10 AM    LYMPHSABS 1.40 02/10/2023 04:10 AM    EOSABS 0.44 02/10/2023 04:10 AM    BASOSABS 0.04 02/10/2023 04:10 AM     Hemoglobin/Hematocrit:    Lab Results   Component Value Date/Time    HGB 9.7 02/10/2023 04:10 AM    HCT 30.7 02/10/2023 04:10 AM     CMP:    Lab Results   Component Value Date/Time     02/10/2023 04:10 AM    K 4.1 02/10/2023 04:10 AM    K 4.2 08/23/2022 12:12 PM    CL 92 02/10/2023 04:10 AM    CO2 31 02/10/2023 04:10 AM    BUN 16 02/10/2023 04:10 AM    CREATININE 3.8 02/10/2023 04:10 AM    GFRAA 8 09/22/2022 12:14 PM    LABGLOM 12 02/10/2023 04:10 AM    GLUCOSE 199 02/10/2023 04:10 AM    PROT 6.8 02/08/2023 08:50 AM    LABALBU 3.4 02/08/2023 08:50 AM    CALCIUM 8.8 02/10/2023 04:10 AM    BILITOT 0.4 02/08/2023 08:50 AM    ALKPHOS 89 02/08/2023 08:50 AM    AST 16 02/08/2023 08:50 AM    ALT 50 02/08/2023 08:50 AM     BMP:    Lab Results   Component Value Date/Time     02/10/2023 04:10 AM    K 4.1 02/10/2023 04:10 AM    K 4.2 08/23/2022 12:12 PM    CL 92 02/10/2023 04:10 AM    CO2 31 02/10/2023 04:10 AM    BUN 16 02/10/2023 04:10 AM    LABALBU 3.4 02/08/2023 08:50 AM    CREATININE 3.8 02/10/2023 04:10 AM    CALCIUM 8.8 02/10/2023 04:10 AM    GFRAA 8 09/22/2022 12:14 PM    LABGLOM 12 02/10/2023 04:10 AM    GLUCOSE 199 02/10/2023 04:10 AM     Hepatic Function Panel:    Lab Results   Component Value Date/Time    ALKPHOS 89 02/08/2023 08:50 AM    ALT 50 02/08/2023 08:50 AM    AST 16 02/08/2023 08:50 AM    PROT 6.8 02/08/2023 08:50 AM    BILITOT 0.4 02/08/2023 08:50 AM    BILIDIR <0.2 02/08/2023 08:50 AM    IBILI see below 02/08/2023 08:50 AM    LABALBU 3.4 02/08/2023 08:50 AM     Ionized Calcium:  No results found for: IONCA  Magnesium:    Lab Results   Component Value Date/Time    MG 1.8 02/10/2023 04:10 AM     Phosphorus:    Lab Results   Component Value Date/Time    PHOS 2.6 02/10/2023 04:10 AM     LDH:    Lab Results   Component Value Date/Time     01/11/2022 12:45 PM     Uric Acid:    Lab Results   Component Value Date/Time    LABURIC 5.1 02/08/2023 08:50 AM     PT/INR:    Lab Results   Component Value Date/Time    PROTIME 13.0 02/08/2023 10:30 AM    INR 1.2 02/08/2023 10:30 AM     Warfarin PT/INR:  No components found for: PTPATWAR, PTINRWAR  PTT:    Lab Results   Component Value Date/Time    APTT 34.5 01/17/2023 08:35 AM   [APTT}  Troponin:  No results found for: TROPONINI  Last 3 Troponin:  No results found for: TROPONINI  U/A:    Lab Results   Component Value Date/Time    COLORU Yellow 11/18/2021 03:19 AM    PROTEINU >=300 11/18/2021 03:19 AM    PHUR 6.0 11/18/2021 03:19 AM    WBCUA 1-3 11/18/2021 03:19 AM    RBCUA 0-1 11/18/2021 03:19 AM    BACTERIA FEW 11/18/2021 03:19 AM    CLARITYU Clear 11/18/2021 03:19 AM    SPECGRAV 1.020 11/18/2021 03:19 AM    LEUKOCYTESUR Negative 11/18/2021 03:19 AM    UROBILINOGEN 0.2 11/18/2021 03:19 AM    BILIRUBINUR Negative 11/18/2021 03:19 AM    BLOODU SMALL 11/18/2021 03:19 AM    GLUCOSEU 500 11/18/2021 03:19 AM     ABG:  No results found for: PH, PCO2, PO2, HCO3, BE, THGB, TCO2, O2SAT  HgBA1c:    Lab Results   Component Value Date/Time    LABA1C 10.3 02/08/2023 11:00 AM     FLP:    Lab Results   Component Value Date/Time    TRIG 148 02/08/2023 08:50 AM    HDL 43 02/08/2023 08:50 AM    LDLCALC 78 02/08/2023 08:50 AM    LABVLDL 30 02/08/2023 08:50 AM     TSH:    Lab Results   Component Value Date/Time    TSH 2.970 02/08/2023 08:50 AM     VITAMIN B12: No components found for: B12  FOLATE:    Lab Results   Component Value Date/Time    FOLATE 10.9 02/08/2023 10:30 AM     IRON:    Lab Results   Component Value Date/Time    IRON 45 01/31/2023 06:35 AM     Iron Saturation:  No components found for: PERCENTFE  TIBC:    Lab Results   Component Value Date/Time    TIBC 173 01/31/2023 06:35 AM     FERRITIN:    Lab Results   Component Value Date/Time    FERRITIN 1,357 01/31/2023 06:35 AM        General appearance: Alert, Awake, Oriented times 3, no distress, nasal cannula oxygen in place, morbidly obese  Skin: Warm and dry ; no rashes  Head: Normocephalic. No masses, lesions or tenderness noted  Eyes: Conjunctivae pale, sclera white.  PERRL,EOM-I  Ears: External ears normal  Nose/Sinuses: Nares normal. Septum midline. Mucosa normal. No drainage  Oropharynx: Oropharynx clear with no exudate seen  Neck: Supple. No jugular venous distension, lymphadenopathy or thyromegaly Trachea midline  Lungs: Decreased breath sounds right lung scattered rhonchi  Heart: S1 S2  Regular rate and rhythm. No rub, gallop; grade 1/6 systolic murmur second intercostal space  Abdomen: Soft, non-tender. BS normal. No masses, organomegaly; no rebound or guarding  Extremities: 2+ bilateral edema; now able to flex her right knee, less edema  Musculoskeletal: Muscular strength appears intact. Neuro:  No focal motor defects ; II-XII grossly intact . MORRIS equally    TELEMETRY: REVIEWED--Telemetry: Sinus    ASSESSMENT:   Principal Problem:    Acute on chronic heart failure with preserved ejection fraction (HCC)  Active Problems:    H/O heart artery stent    Diabetes mellitus (HCC)    End-stage renal disease on hemodialysis (HCC)    Lymphedema of both lower extremities    Aortic stenosis, mild    Anemia due to stage 5 chronic kidney disease ([facility])    History of pulmonary embolus (PE)    S/P insertion of inferior vena caval filter    Essential hypertension    Obesity, Class III, BMI 40-49.9 (morbid obesity) ([facility] [address]  Resolved Problems:    * No resolved hospital problems.  *      PLAN:  SEE ORDERS      RE  CHANGES AND FINDINGS   Medications reviewed with patient  GI prophylaxis  DVT prophylaxis  Consultants notes reviewed    Continue hemodialysis  Lantus 20 units nightly  Low-dose sliding scale insulin  Humalog 4 units 3 times daily  Rosuvastatin 5 mg  has been discontinued  Atorvastatin 10 mg daily has been started  She may need thoracentesis in view of persistent effusion  2/10/2023  EGD today  Diphenhydramine 25 mg IV every 6 hours if needed for pruritus  Amlodipine 2.5 mg daily  Atorvastatin 10 mg daily  Lantus 20 units nightly  Humalog 4 units 3 times daily with meals  Low-dose sliding scale insulin  Continue hemodialysis  Repeat chest x-ray 2/12/2022 for size of effusion  May need IR thoracentesis if effusion not significantly lower          Discussed with patient and nursing. Salazar Gonzalez DO     9:20 AM     2/10/2023    TIME > 35 MINUTES    Please note that over 35 minutes was spent . Greater than 51% includes interviewing patient and reviewing chart; performing medical examination; ordering medications, tests and/or procedures; formulating assessment plan, reviewing rationale for the above recommendations; reviewing available records, results of all previously ordered tests and procedures and current problem list; counseling/educating the patient; coordinating care with other healthcare professionals; communicating results to the patient's family/caregiver; documenting clinical information in the electronic record                ------------  INFORMATION  -----------      DIET:Diet NPO        Allergies   Allergen Reactions    Pcn [Penicillins] Shortness Of Breath and Rash    Benzonatate Other (See Comments)     \"PATIENTS STATES \"IT WAS LIKE I WAS DRUNK. \"    Morphine Itching and Rash    Sodium Hypochlorite Nausea And Vomiting and Rash     AKA BLEACH.   RASH FROM SKIN EXPOSURE          MEDICATION SIDE EFFECTS:none       SCHEDULED MEDS:                                 Scheduled Meds:   atorvastatin  10 mg Oral Daily    amLODIPine  2.5 mg Oral Daily    calcium acetate  667 mg Oral TID WC    insulin glargine  20 Units SubCUTAneous Nightly    insulin lispro  4 Units SubCUTAneous TID WC    pantoprazole  40 mg Oral BID AC    insulin lispro  0-4 Units SubCUTAneous TID WC    insulin lispro  0-4 Units SubCUTAneous Nightly    heparin (porcine)  5,000 Units SubCUTAneous Q8H       Continuous Infusions:   dextrose           Data:       Intake/Output Summary (Last 24 hours) at 2/10/2023 0920  Last data filed at 2/9/2023 1245  Gross per 24 hour   Intake 300 ml   Output 3000 ml   Net -2700 ml       Wt Readings from Last 3 Encounters:   02/10/23 252 lb 6.8 oz (114.5 kg)   01/31/23 260 lb 5.8 oz (118.1 kg)   01/30/23 262 lb (118.8 kg)       Labs: Additional    GLUCOSE:No results for input(s): POCGLU in the last 72 hours. BNP:No results found for: BNP    CRP: No results for input(s): CRP in the last 72 hours. ESR:No results for input(s): SEDRATE in the last 72 hours. RADIOLOGY: REVIEWED AVAILABLE REPORT  XR CHEST PORTABLE   Final Result   Findings suggest some degree of CHF, unchanged         CTA PULMONARY W CONTRAST   Final Result   1. Limited examination due to motion and suboptimal timing of contrast.  No   large central or proximal segmental pulmonary arterial embolism. Pulmonary   embolism seen on prior is not definitively visualized. Repeat CTA may be   helpful for further evaluation. 2. Increased opacities in right lower lobe suggesting atelectasis and   pneumonia. 3. New right pleural effusion. 4. Pulmonary vascular congestion and findings which could indicate right   heart failure. US DUP LOWER EXTREMITIES BILATERAL VENOUS   Final Result   No visible DVT in either lower extremity. XR CHEST PORTABLE   Final Result   1. Cardiomegaly. There are no findings of CHF   2. Possible bibasilar airspace disease and or pleural effusions.                Anabelle Dillon DO    9:20 AM     2/10/2023      Voice recognition software used for dictation

## 2023-02-10 NOTE — BRIEF OP NOTE
Brief Postoperative Note    Gilford Deck  YOB: 1955  [id number]    Procedure: EGD with biopsy    Anesthesia: [facility]    Surgeon:  Tammie Piper MD    Findings:       Esophagus:  GERD      Stomach:  Gastritis     Duodenum:  DUODENITIS     Complications: None      Estimated blood loss: none      Adolfo White MD

## 2023-02-10 NOTE — PROGRESS NOTES
Patient complaining of itching on arms/back/and around neck. No hives or redness noted on assessment. Patient in no acute distress. Dr. Raad Zuñiga notified of this via phone. See new orders.

## 2023-02-10 NOTE — FLOWSHEET NOTE
02/10/23 1020   Vital Signs   /63   Temp 97.5 °F (36.4 °C)   Heart Rate 68   Resp 18   Weight 249 lb 12.5 oz (113.3 kg)   Weight Method Stated   Percent Weight Change -1.05   Pain Assessment   Pain Assessment None - Denies Pain   Post-Hemodialysis Assessment   Post-Treatment Procedures Blood returned; Access bleeding time < 10 minutes   Machine Disinfection Process Exterior Machine Disinfection   Rinseback Volume (ml) 300 ml   Blood Volume Processed (Liters) 0 l/min   Dialyzer Clearance Moderately streaked   Duration of Treatment (minutes) 154 minutes   Hemodialysis Intake (ml) 300 ml   Hemodialysis Output (ml) 1500 ml   NET Removed (ml) 1200   Tolerated Treatment Good   Patient Response to Treatment PT taken off tx 26mins early due to pt system clotted Dr. Soria Doing at bedside pt tolerated tx well 1200mL removed bruit/thrill present nurse to nurse report given to Gabe Walker RN pt/vitals stable upon d/c   Bilateral Breath Sounds Diminished   Edema Generalized;Right upper extremity; Left upper extremity;Right lower extremity; Left lower extremity   Edema Generalized +2   RUE Edema +2   LUE Edema +2   RLE Edema +2   LLE Edema +2   Physician Notified Yes   Time Off 1020   Patient Disposition Return to room

## 2023-02-10 NOTE — PROGRESS NOTES
[facility]  [address] [facility], [address], [address] [facility]      Pulmonary Progress Note      SUBJECTIVE:  seen at HD, feeling some better with breathing, not much cough/congestion. For EGD today.     OBJECTIVE    Medications    Continuous Infusions:   dextrose         Scheduled Meds:   atorvastatin  10 mg Oral Daily    amLODIPine  2.5 mg Oral Daily    calcium acetate  667 mg Oral TID WC    insulin glargine  20 Units SubCUTAneous Nightly    insulin lispro  4 Units SubCUTAneous TID WC    pantoprazole  40 mg Oral BID AC    insulin lispro  0-4 Units SubCUTAneous TID WC    insulin lispro  0-4 Units SubCUTAneous Nightly    heparin (porcine)  5,000 Units SubCUTAneous Q8H       PRN Meds:diphenhydrAMINE, acetaminophen, glucose, midodrine, glucose, dextrose bolus **OR** dextrose bolus, glucagon (rDNA), dextrose, acetaminophen    Physical    VITALS:  BP (!) 115/47   Pulse 56   Temp 98.3 °F (36.8 °C)   Resp 18   Ht 5' 4\" (1.626 m)   Wt 252 lb 6.8 oz (114.5 kg)   SpO2 96%   BMI 43.33 kg/m²     24HR INTAKE/OUTPUT:      Intake/Output Summary (Last 24 hours) at 2/10/2023 1023  Last data filed at 2023 1245  Gross per 24 hour   Intake 300 ml   Output 3000 ml   Net -2700 ml         24HR PULSE OXIMETRY RANGE:    SpO2  Av.8 %  Min: 93 %  Max: 96 %    General appearance: alert, appears stated age, and cooperative, obese  Lungs: rhonchi bibasilar  Heart: regular rate and rhythm, S1, S2 normal, no murmur, click, rub or gallop  Abdomen: soft, non-tender; bowel sounds normal; no masses,  no organomegaly  Extremities: extremities normal, atraumatic, no cyanosis or edema  Neurologic: Mental status: Alert, oriented, thought content appropriate    Data    CBC:   Recent Labs     23  1030 23  0430 02/10/23  0410   WBC 7.0 7.3 7.8   HGB 8.6* 9.5* 9.7*   HCT 27.9* 31.1* 30.7*   .4* 100.6* 98.1    176 189         BMP:  Recent Labs 02/08/23  0850 02/09/23  0430 02/10/23  0410    138 136   K 4.2 3.8 4.1   CL 96* 94* 92*   CO2 26 29 31*   PHOS 4.4 3.7 2.6   BUN 23 18 16   CREATININE 5.1* 4.1* 3.8*      ALB:3,BILIDIR:3,BILITOT:3,ALKPHOS:3)@    PT/INR:   Recent Labs     02/08/23  1030   PROTIME 13.0*   INR 1.2         ABG:   No results for input(s): PH, PO2, PCO2, HCO3, BE, O2SAT, METHB, O2HB, COHB, O2CON, HHB, THB in the last 72 hours. Radiology/Other tests reviewed: CXR reviewed with similar/slightly increased right pleural effusion    Assessment:     Principal Problem:    Acute on chronic heart failure with preserved ejection fraction (HCC)  Active Problems:    H/O heart artery stent    Diabetes mellitus (HCC)    End-stage renal disease on hemodialysis (HCC)    Lymphedema of both lower extremities    Anemia due to stage 5 chronic kidney disease ([facility])    S/P insertion of inferior vena caval filter    Essential hypertension    Obesity, Class III, BMI 40-49.9 (morbid obesity) ([facility] [address]    Aortic stenosis, mild    History of pulmonary embolus (PE)  Resolved Problems:    * No resolved hospital problems. *      Plan:       Cont with HD as per Renal, watch fluid balance  Repeat CXR over the weekend and decide on IR thoracentesis next week if still needed or not, large body habitus, may be difficult to do at bedside  Cont with oxygen, taper as tolerated  No lung medications, observe  OOB to chair, ambulate as tolerated      Time at the bedside, reviewing labs and radiographs, reviewing notes and consultations, discussing with staff and family was more than 35 minutes. Thanks for letting us see this patient in consultation. Please contact us with any questions. Office [phone] or after hours through [employer], x [phone].

## 2023-02-11 LAB
ANION GAP SERPL CALCULATED.3IONS-SCNC: 12 MMOL/L (ref 7–16)
BUN BLDV-MCNC: 32 MG/DL (ref 6–23)
CALCIUM SERPL-MCNC: 9.1 MG/DL (ref 8.6–10.2)
CHLORIDE BLD-SCNC: 92 MMOL/L (ref 98–107)
CO2: 31 MMOL/L (ref 22–29)
CREAT SERPL-MCNC: 6.3 MG/DL (ref 0.5–1)
GFR SERPL CREATININE-BSD FRML MDRD: 7 ML/MIN/1.73
GLUCOSE BLD-MCNC: 318 MG/DL (ref 74–99)
HCT VFR BLD CALC: 31.1 % (ref 34–48)
HEMOGLOBIN: 9.6 G/DL (ref 11.5–15.5)
MAGNESIUM: 2 MG/DL (ref 1.6–2.6)
METER GLUCOSE: 215 MG/DL (ref 74–99)
METER GLUCOSE: 239 MG/DL (ref 74–99)
METER GLUCOSE: 277 MG/DL (ref 74–99)
METER GLUCOSE: 308 MG/DL (ref 74–99)
PHOSPHORUS: 3.5 MG/DL (ref 2.5–4.5)
POTASSIUM SERPL-SCNC: 4.7 MMOL/L (ref 3.5–5)
SODIUM BLD-SCNC: 135 MMOL/L (ref 132–146)

## 2023-02-11 PROCEDURE — 85014 HEMATOCRIT: CPT

## 2023-02-11 PROCEDURE — 85018 HEMOGLOBIN: CPT

## 2023-02-11 PROCEDURE — 80048 BASIC METABOLIC PNL TOTAL CA: CPT

## 2023-02-11 PROCEDURE — 6370000000 HC RX 637 (ALT 250 FOR IP): Performed by: INTERNAL MEDICINE

## 2023-02-11 PROCEDURE — 2700000000 HC OXYGEN THERAPY PER DAY

## 2023-02-11 PROCEDURE — 6360000002 HC RX W HCPCS: Performed by: INTERNAL MEDICINE

## 2023-02-11 PROCEDURE — 36415 COLL VENOUS BLD VENIPUNCTURE: CPT

## 2023-02-11 PROCEDURE — 2500000003 HC RX 250 WO HCPCS: Performed by: INTERNAL MEDICINE

## 2023-02-11 PROCEDURE — 84100 ASSAY OF PHOSPHORUS: CPT

## 2023-02-11 PROCEDURE — 2060000000 HC ICU INTERMEDIATE R&B

## 2023-02-11 PROCEDURE — 82962 GLUCOSE BLOOD TEST: CPT

## 2023-02-11 PROCEDURE — 83735 ASSAY OF MAGNESIUM: CPT

## 2023-02-11 PROCEDURE — 90935 HEMODIALYSIS ONE EVALUATION: CPT

## 2023-02-11 RX ORDER — PANTOPRAZOLE SODIUM 40 MG/1
40 TABLET, DELAYED RELEASE ORAL
Status: DISCONTINUED | OUTPATIENT
Start: 2023-02-12 | End: 2023-02-12 | Stop reason: HOSPADM

## 2023-02-11 RX ADMIN — HEPARIN SODIUM 5000 UNITS: 10000 INJECTION INTRAVENOUS; SUBCUTANEOUS at 03:00

## 2023-02-11 RX ADMIN — INSULIN LISPRO 4 UNITS: 100 INJECTION, SOLUTION INTRAVENOUS; SUBCUTANEOUS at 13:02

## 2023-02-11 RX ADMIN — INSULIN LISPRO 4 UNITS: 100 INJECTION, SOLUTION INTRAVENOUS; SUBCUTANEOUS at 17:26

## 2023-02-11 RX ADMIN — ATORVASTATIN CALCIUM 10 MG: 10 TABLET, FILM COATED ORAL at 13:00

## 2023-02-11 RX ADMIN — HEPARIN SODIUM 5000 UNITS: 10000 INJECTION INTRAVENOUS; SUBCUTANEOUS at 13:11

## 2023-02-11 RX ADMIN — INSULIN GLARGINE 20 UNITS: 100 INJECTION, SOLUTION SUBCUTANEOUS at 21:23

## 2023-02-11 RX ADMIN — HEPARIN SODIUM 5000 UNITS: 10000 INJECTION INTRAVENOUS; SUBCUTANEOUS at 22:00

## 2023-02-11 RX ADMIN — INSULIN LISPRO 3 UNITS: 100 INJECTION, SOLUTION INTRAVENOUS; SUBCUTANEOUS at 17:26

## 2023-02-11 RX ADMIN — DIPHENHYDRAMINE HYDROCHLORIDE 25 MG: 50 INJECTION, SOLUTION INTRAMUSCULAR; INTRAVENOUS at 21:22

## 2023-02-11 RX ADMIN — INSULIN LISPRO 1 UNITS: 100 INJECTION, SOLUTION INTRAVENOUS; SUBCUTANEOUS at 13:03

## 2023-02-11 RX ADMIN — AMLODIPINE BESYLATE 2.5 MG: 2.5 TABLET ORAL at 13:00

## 2023-02-11 RX ADMIN — CALCIUM ACETATE 667 MG: 667 CAPSULE ORAL at 13:00

## 2023-02-11 ASSESSMENT — PAIN SCALES - GENERAL
PAINLEVEL_OUTOF10: 0
PAINLEVEL_OUTOF10: 0

## 2023-02-11 NOTE — OP NOTE
[address] [address]                                OPERATIVE REPORT    PATIENT NAME: Hema Davis                     :        1955  MED REC NO:   [id number]                            ROOM:       0001  ACCOUNT NO:   [de-identified]                           ADMIT DATE: 2023  PROVIDER:     Pro Weaver MD    DATE OF PROCEDURE:  02/10/2023    PROCEDURE PERFORMED:  Upper endoscopy. PREOPERATIVE DIAGNOSIS:  Evaluation for followup on bleeding ulcer in  view of the patient needing anticoagulation for her recurrent DVTs. POSTOPERATIVE DIAGNOSES:  Complete healing of previously seen  ulceration, minimal gastritis, moderately severe duodenitis. No active  bleeding seen in this study, so would recommend careful reintroduction  of anticoagulation with followup and will discuss with Vascular Surgery. ANESTHESIA:  LMAC. NOTE:  Prior to the procedure an informed consent was obtained from the  patient after explaining the benefits as well as the risks,  alternatives, and complications of the procedure to the patient, who  understood and agreed. PROCEDURE:  With the patient in the left lateral decubitus position, the  Olympus GIF-100 forward-viewing videoscope was introduced into the  esophagus, the evaluation of which showed grade B GERD and no hiatal  hernia was seen. The scope was then advanced through the gastroesophageal junction into  the gastric body, along the greater curvature. Evaluation of the body  of the stomach showed minimal gastritis, complete healing of previously  seen lesions, and no active bleeding seen. The scope was then advanced through the pylorus into the duodenal bulb  and second portion of the duodenum. Duodenum showed duodenitis at the  bulb, otherwise, unremarkable.   The scope was then retrieved and  retroflexed in the prepyloric antrum, with thorough evaluation of the  cardiac and fundal portions of the stomach, which appeared to be within  normal limits. The scope was then straightened, the area deflated, and the procedure  was terminated by withdrawing the scope and conducting a second look on  the way out, which was essentially the same. The patient tolerated the procedure well.         Lilian Amaral MD    D: 02/10/2023 14:58:19       T: 02/10/2023 15:01:49     [id number]  Job#: [id number]     Doc#: [id number]    CC:   _____ Shantelle Carrion MD

## 2023-02-11 NOTE — PROGRESS NOTES
Subjective:    Chief complaint:    Doing okay  Denies new issues No problems overnight.  Denies chest pain, angina, and dyspnea.  Denies abdominal pain.  Tolerating diet.  No nausea or vomiting.    Objective:    BP (!) 147/66   Pulse 74   Temp 97.6 °F (36.4 °C) (Oral)   Resp 17   Ht 5' 4\" (1.626 m)   Wt 254 lb 10.1 oz (115.5 kg)   SpO2 99%   BMI 43.71 kg/m²   General : Awake ,alert,no distress.  Heart:  RRR, no murmurs, gallops, or rubs.  Lungs:  CTA bilaterally, no wheeze, rales or rhonchi  Abd: bowel sounds present, nontender, nondistended, no masses  Extrem:  No clubbing, cyanosis, or edema    CBC:   Lab Results   Component Value Date/Time    WBC 7.8 02/10/2023 04:10 AM    RBC 3.13 02/10/2023 04:10 AM    HGB 9.6 02/11/2023 10:10 AM    HCT 31.1 02/11/2023 10:10 AM    MCV 98.1 02/10/2023 04:10 AM    MCH 31.0 02/10/2023 04:10 AM    MCHC 31.6 02/10/2023 04:10 AM    RDW 14.6 02/10/2023 04:10 AM     02/10/2023 04:10 AM    MPV 10.2 02/10/2023 04:10 AM     BMP:    Lab Results   Component Value Date/Time     02/11/2023 04:07 AM    K 4.7 02/11/2023 04:07 AM    K 4.2 08/23/2022 12:12 PM    CL 92 02/11/2023 04:07 AM    CO2 31 02/11/2023 04:07 AM    BUN 32 02/11/2023 04:07 AM    LABALBU 3.4 02/08/2023 08:50 AM    CREATININE 6.3 02/11/2023 04:07 AM    CALCIUM 9.1 02/11/2023 04:07 AM    GFRAA 8 09/22/2022 12:14 PM    LABGLOM 7 02/11/2023 04:07 AM    GLUCOSE 318 02/11/2023 04:07 AM     PT/INR:    Lab Results   Component Value Date/Time    PROTIME 13.0 02/08/2023 10:30 AM    INR 1.2 02/08/2023 10:30 AM     Troponin:  No results found for: TROPONINI    No results for input(s): LABURIN in the last 72 hours.  No results for input(s): BC in the last 72 hours.  No results for input(s): BLOODCULT2 in the last 72 hours.      Current Facility-Administered Medications:     [START ON 2/12/2023] pantoprazole (PROTONIX) tablet 40 mg, 40 mg, Oral, QAM AC, Shiloh A Sugar, APRN - CNP    diphenhydrAMINE (BENADRYL) injection 25  mg, 25 mg, IntraVENous, Q6H PRN, Diana Thapa Mihok, DO, 25 mg at 02/10/23 2334    atorvastatin (LIPITOR) tablet 10 mg, 10 mg, Oral, Daily, Connee Roulette, DO, 10 mg at 02/11/23 1300    amLODIPine (NORVASC) tablet 2.5 mg, 2.5 mg, Oral, Daily, Connee Roulette, DO, 2.5 mg at 02/11/23 1300    calcium acetate (PHOSLO) capsule 667 mg, 667 mg, Oral, TID WC, Loc A Mihok, DO, 667 mg at 02/11/23 1300    glucose chewable tablet 16 g, 4 tablet, Oral, PRN, Christina Oxford A Mihok, DO    insulin glargine (LANTUS) injection vial 20 Units, 20 Units, SubCUTAneous, Nightly, Loc Garciahok, DO, 20 Units at 02/10/23 1954    insulin lispro (HUMALOG) injection vial 4 Units, 4 Units, SubCUTAneous, TID WC, Loc A Mihok, DO, 4 Units at 02/11/23 1302    midodrine (PROAMATINE) tablet 10 mg, 10 mg, Oral, Daily PRN, Christina Oxford A Mihok, DO    glucose chewable tablet 16 g, 4 tablet, Oral, PRN, Christina Oxford A Mihok, DO    dextrose bolus 10% 125 mL, 125 mL, IntraVENous, PRN **OR** dextrose bolus 10% 250 mL, 250 mL, IntraVENous, PRN, Christina Oxford A Mihok, DO    glucagon (rDNA) injection 1 mg, 1 mg, IntraMUSCular, PRN, Christina Oxford A Mihok, DO    dextrose 10 % infusion, , IntraVENous, Continuous PRN, Christina Oxford A Mihok, DO    insulin lispro (HUMALOG) injection vial 0-4 Units, 0-4 Units, SubCUTAneous, TID WC, Loc A Mihok, DO, 1 Units at 02/11/23 1303    insulin lispro (HUMALOG) injection vial 0-4 Units, 0-4 Units, SubCUTAneous, Nightly, Loc A Mihok, DO    heparin (porcine) injection 5,000 Units, 5,000 Units, SubCUTAneous, Q8H, Loc A Mihok, DO, 5,000 Units at 02/11/23 1311    acetaminophen (TYLENOL) tablet 650 mg, 650 mg, Oral, Q4H PRN, Diana Alanis Anne DO, 650 mg at 02/08/23 2209    ADULT DIET; Regular; Low Sodium (2 gm)    XR CHEST PORTABLE   Final Result   Findings suggest some degree of CHF, unchanged         CTA PULMONARY W CONTRAST   Final Result   1.  Limited examination due to motion and suboptimal timing of contrast.  No   large central or proximal segmental pulmonary arterial embolism. Pulmonary   embolism seen on prior is not definitively visualized. Repeat CTA may be   helpful for further evaluation. 2. Increased opacities in right lower lobe suggesting atelectasis and   pneumonia. 3. New right pleural effusion. 4. Pulmonary vascular congestion and findings which could indicate right   heart failure. US DUP LOWER EXTREMITIES BILATERAL VENOUS   Final Result   No visible DVT in either lower extremity. XR CHEST PORTABLE   Final Result   1. Cardiomegaly. There are no findings of CHF   2. Possible bibasilar airspace disease and or pleural effusions. Assessment:    Principal Problem:    Acute on chronic heart failure with preserved ejection fraction (HCC)  Active Problems:    H/O heart artery stent    Diabetes mellitus (HCC)    End-stage renal disease on hemodialysis (HCC)    Lymphedema of both lower extremities    Anemia due to stage 5 chronic kidney disease ([facility])    S/P insertion of inferior vena caval filter    Essential hypertension    Obesity, Class III, BMI 40-49.9 (morbid obesity) ([address]    Aortic stenosis, mild    History of pulmonary embolus (PE)  Resolved Problems:    * No resolved hospital problems. *      Plan:    GI plans noted  Hemoglobin stable  Colonoscopy as an outpatient  Patient has a history of PE however has IVC filter  Current currently off anticoagulation  Just returned from hemodialysis  Per pulmonary. Repeat chest x-ray for possible thoracentesis  Increase activity as able      Koby Weaver MD  2:07 PM  2/11/2023    NOTE: This report was transcribed using voice recognition software.  Every effort was made to ensure accuracy; however, inadvertent transcription errors may be present

## 2023-02-11 NOTE — CONSULTS
CHF NURSE NAVIGATOR CONSULT NOTE:      Patient currently admitted with diagnosis of Diastolic heart failure. Patient was awake and alert during the consultation. She was engaged and asked appropriate questions throughout the education session. She is agreeable to education. Scheduling with the CHF clinic No: not a candidate due to dialysis . Future Appointments   Date Time Provider Surinder Oharai   2/13/2023  3:00 PM TAWNYA Hamilton - NP [facility]   4/3/2023  9:00 AM Marcin Brito MD [facility]       Barriers to Care:  Contributing risk factors for Heart Failure are identified as none. The patient is ordered:  Diet: ADULT DIET; Regular; Low Sodium (2 gm)   Sodium controlled diet Yes  Fluid restriction daily ordered (fluid restriction recommended if patient is hyponatremic and/or diuretic is initiated or increased) No  FR:   Daily Weights: Patient Vitals for the past 96 hrs (Last 3 readings):   Weight   02/11/23 1120 254 lb 10.1 oz (115.5 kg)   02/11/23 0748 257 lb 4.4 oz (116.7 kg)   02/10/23 1020 249 lb 12.5 oz (113.3 kg)     I/O:   Intake/Output Summary (Last 24 hours) at 2/11/2023 1423  Last data filed at 2/11/2023 1120  Gross per 24 hour   Intake 350 ml   Output 1500 ml   Net -1150 ml              We reviewed the introduction to Heart Failure, the HF zones, signs and symptoms to report on day 1 of onset, medications, medication compliance, the importance of obtaining daily weights, following a low sodium diet, reading food labels for the sodium content, keeping physician appointments, and smoking cessation. We discussed writing / tracking daily weights on a calendar / log because a 5 pound gain in 1 week can sneak up if you are not tracking it. I advised patient they can reduce the risk for Heart Failure exacerbations by modifying / controlling the risk factors.  We discussed self-managed care which includes the following:  to take medications as prescribed, report any intolerable side effects of medications to the cardiologist / doctor, do not just stop taking the medication; follow a cardiac heart healthy / low sodium diet; weigh yourself daily, exercise regularly- per doctor recommendation and not to smoke or use an excess amount of alcohol. We discussed calling the cardiologist / doctor with a weight gain of 3 pounds in one day or a total of 5 pounds or more in one week. Also, if you should have a significant weight loss of 3# or more in one day to call the doctor, they may need to decrease or hold the diuretic dose. On days you feels nauseated and not eating / drinking, having emesis or diarrhea,  informed to call the cardiologist  / doctor, they may need to decrease or hold diuretic to avoid dehydration. I stressed the importance of informing their cardiologist the first day of onset of any of the signs and symptoms in the \"Yellow Zone\" of the HF Zones. Patient verbalizes understanding. Greater than 30 minutes was spent educating patient. The Heart Failure Booklet given to the patient with additional patient education addressing:  What is Heart Failure? Things You Can Do to Live Well with HF  How to Take Your Medications  How to Eat Less Salt  Imperial its Salt?   Exercising Well with Heart Failure  Signs and symptoms of HF to report  Weight Yourself Each Day  Home Self Management- activity, weight tracking, taking medications as prescribed, meals /diet planning (sodium and fluid restriction), how to read food labels, keeping physician follow ups, smoking cessation, follow the Heart Failure Zones  The Heart Failure zones  Every Dose Every Day      Instructed  to call 911 if you have any of the following symptoms:       Struggling to breathe unrelieved with rest,     Having chest pain     Have confusion or cant think clearly          Anamaria Arevalo, RN BSN, RN  Heart Failure Navigator        CONGESTIVE HEART FAILURE (CHF) AHA GUIDELINES  (Must be completed for Primary Diagnosis CHF or History of CHF)    Discharge Plan:  I placed the Heart Failure Home Instructions in patient's discharge instructions. Per Heart Failure GWTG, the patient should have a follow-up appointment made within 7 days of discharge.     New Diagnosis No    ECHO Results most recent:  Lab Results   Component Value Date    LVEF 58 2022                                        Social History     Tobacco Use   Smoking Status Former    Packs/day: 1.00    Years: 12.00    Pack years: 12.00    Types: Cigarettes    Start date: 66    Quit date:     Years since quittin.1   Smokeless Tobacco Never        Immunization History   Administered Date(s) Administered    COVID-19, PFIZER PURPLE top, DILUTE for use, (age 15 y+), 30mcg/0.3mL 2021, 2021    Influenza A ([id number]),all Formulations 2010    Influenza Virus Vaccine 2016    Influenza, FLUBLOK, (age 25 y+), PF, 0.5mL 03/15/2021          Angiotensin-Converting-Enzyme (ACE) inhibitor ordered:  [] Yes  [x] No (specify contraindication):    [] Contraindicated  [x] Hypotensive patient who was at immediate risk of cardiogenic shock  [x] Hospitalized patient who experienced marked azotemia  [] Other Contraindications  [] Not Eligible  [] Not Tolerant  [] Patient Reason  [] System Reason  [] Other Reason    Angiotensin II receptor blockers (ARB) ordered:  [] Yes  [x] No (specify contraindication):    [] Contraindicated  [x] Hypotensive patient who was at immediate risk of cardiogenic shock  [x] Hospitalized patient who experienced marked azotemia  [] Other Contraindications    ARNI - Angiotensin Receptor Neprilysin Inhibitor ordered:  [] Yes  [x] No (specify contraindication):    [] ACE inhibitor use within the prior 36 hours  [] Allergy  [] Hyperkalemia  [] Hypotension  [x] Renal dysfunction defined as creatinine > 2.5 mg/dL in men or > 2.0 mg/dL in women  [] Other Contraindications  [] Not Eligible  [] Not Tolerant  [] Patient Reason  []System Reason  []Other Reason      Beta Blocker ordered:    [] Yes  [x] No (specify contraindication):    [] Contraindicated  [] Asthma  [] Fluid Overload  [x] Low Blood Pressure  [] Patient recently treated with an intravenous positive inotropic agent  [] Other Contraindications  [] Not Eligible  [] Not Tolerant  [] Patient Reason  [] System Reason    SGLT2 Inhibitor ordered:  [] Yes  [x] No (specify contraindication):    [] Contraindicated  [x] Patient currently on dialysis  [] Ketoacidosis  [] Known hypersensitivity to the medication  [] Type I diabetes (not approved for use in patients with Type I diabetes due to increased risk of ketoacidosis)  [] Other Contraindications  [] Not Eligible  [] Not Tolerant  [] Patient Reason  [] System Reason  [] Other Reason    Aldosterone Antagonist ordered:  [] Yes  [x] No (specify contraindication):    [] Contraindicated  [] Allergy due to aldosterone receptor antagonist  [] Hyperkalemia  [x] Renal dysfunction defined as creatinine >2.5 mg/dL in men or >2.0 mg/dL in women.   [] Other contraindications  [] Not Eligible  [] Not Tolerant  [] Patient Reason  [] System Reason  [] Other Reason

## 2023-02-11 NOTE — PROGRESS NOTES
[facility]  [address], [address], [address]      Pulmonary Progress Note      SUBJECTIVE:  sitting up in chair on 2 li NC, feeling some better with breathing, not much cough/congestion. EGD done yesterday and still with some gastritis/duodenitis, (?) still holding off on anticoagulation.     OBJECTIVE    Medications    Continuous Infusions:   dextrose         Scheduled Meds:   [START ON 2023] pantoprazole  40 mg Oral QAM AC    atorvastatin  10 mg Oral Daily    amLODIPine  2.5 mg Oral Daily    calcium acetate  667 mg Oral TID WC    insulin glargine  20 Units SubCUTAneous Nightly    insulin lispro  4 Units SubCUTAneous TID WC    insulin lispro  0-4 Units SubCUTAneous TID WC    insulin lispro  0-4 Units SubCUTAneous Nightly    heparin (porcine)  5,000 Units SubCUTAneous Q8H       PRN Meds:diphenhydrAMINE, glucose, midodrine, glucose, dextrose bolus **OR** dextrose bolus, glucagon (rDNA), dextrose, acetaminophen    Physical    VITALS:  BP (!) 147/66   Pulse 74   Temp 97.6 °F (36.4 °C) (Oral)   Resp 17   Ht 5' 4\" (1.626 m)   Wt 254 lb 10.1 oz (115.5 kg)   SpO2 99%   BMI 43.71 kg/m²     24HR INTAKE/OUTPUT:      Intake/Output Summary (Last 24 hours) at 2023 1517  Last data filed at 2023 1120  Gross per 24 hour   Intake 300 ml   Output 1500 ml   Net -1200 ml         24HR PULSE OXIMETRY RANGE:    SpO2  Av.7 %  Min: 96 %  Max: 99 %    General appearance: alert, appears stated age, and cooperative, obese  Lungs: rhonchi bibasilar  Heart: regular rate and rhythm, S1, S2 normal, no murmur, click, rub or gallop  Abdomen: soft, non-tender; bowel sounds normal; no masses,  no organomegaly  Extremities: extremities normal, atraumatic, no cyanosis or edema  Neurologic: Mental status: Alert, oriented, thought content appropriate    Data    CBC:   Recent Labs     23  0430 02/10/23  0410 23  1010   WBC 7.3 7.8  -- HGB 9.5* 9.7* 9.6*   HCT 31.1* 30.7* 31.1*   .6* 98.1  --     189  --          BMP:  Recent Labs     02/09/23  0430 02/10/23  0410 02/11/23  0407    136 135   K 3.8 4.1 4.7   CL 94* 92* 92*   CO2 29 31* 31*   PHOS 3.7 2.6 3.5   BUN 18 16 32*   CREATININE 4.1* 3.8* 6.3*      ALB:3,BILIDIR:3,BILITOT:3,ALKPHOS:3)@    PT/INR:   No results for input(s): PROTIME, INR in the last 72 hours. ABG:   No results for input(s): PH, PO2, PCO2, HCO3, BE, O2SAT, METHB, O2HB, COHB, O2CON, HHB, THB in the last 72 hours. Radiology/Other tests reviewed: CXR reviewed with similar/slightly increased right pleural effusion    Assessment:     Principal Problem:    Acute on chronic heart failure with preserved ejection fraction (HCC)  Active Problems:    H/O heart artery stent    Diabetes mellitus (HCC)    End-stage renal disease on hemodialysis (HCC)    Lymphedema of both lower extremities    Anemia due to stage 5 chronic kidney disease (HCC)    S/P insertion of inferior vena caval filter    Essential hypertension    Obesity, Class III, BMI 40-49.9 (morbid obesity) ([facility] [address]    Aortic stenosis, mild    History of pulmonary embolus (PE)  Resolved Problems:    * No resolved hospital problems. *      Plan:       Cont with HD as per Renal, watch fluid balance  Repeat CXR tomorrow and decide on IR thoracentesis next week if still needed or not, large body habitus, may be difficult to do at bedside  Cont with oxygen, taper as tolerated  No lung medications, observe  OOB to chair, ambulate as tolerated      Time at the bedside, reviewing labs and radiographs, reviewing notes and consultations, discussing with staff and family was more than 35 minutes. Thanks for letting us see this patient in consultation. Please contact us with any questions. Office [phone] or after hours through [employer], x [phone].

## 2023-02-11 NOTE — PROGRESS NOTES
Physical Therapy    Pt NA for Rx this AM, off the floor at dialysis. Will follow on another date/time.   Radha Sanchez PTA [id number]

## 2023-02-11 NOTE — FLOWSHEET NOTE
02/11/23 1120   Vital Signs   /64   Temp 97.8 °F (36.6 °C)   Heart Rate 98   Resp 18   Weight 254 lb 10.1 oz (115.5 kg)   Weight Method Estimated   Percent Weight Change -1.03   Pain Assessment   Pain Assessment None - Denies Pain   Post-Hemodialysis Assessment   Post-Treatment Procedures Blood returned; Access bleeding time < 10 minutes   Machine Disinfection Process Acid/Vinegar Clean;Heat Disinfect; Exterior Machine Disinfection   Rinseback Volume (ml) 300 ml   Blood Volume Processed (Liters) 70.1 l/min   Dialyzer Clearance Clotted   Duration of Treatment (minutes) 200 minutes   Hemodialysis Intake (ml) 300 ml   Hemodialysis Output (ml) 1500 ml   NET Removed (ml) 1200   Tolerated Treatment Good   Patient Response to Treatment Pt w 40 minutes left of tx, TMP rising, system clotting. Dr Covarrubias Side aware, pt rinsed back all blood returned and not placed back on tx. catheter clamped and capped, stable at dc   Bilateral Breath Sounds Diminished   Edema Right lower extremity; Left lower extremity   Patient Disposition Return to room

## 2023-02-12 ENCOUNTER — APPOINTMENT (OUTPATIENT)
Dept: GENERAL RADIOLOGY | Age: 68
DRG: 291 | End: 2023-02-12
Payer: MEDICARE

## 2023-02-12 VITALS
WEIGHT: 249 LBS | RESPIRATION RATE: 18 BRPM | DIASTOLIC BLOOD PRESSURE: 57 MMHG | SYSTOLIC BLOOD PRESSURE: 118 MMHG | HEART RATE: 71 BPM | HEIGHT: 64 IN | TEMPERATURE: 97 F | OXYGEN SATURATION: 100 % | BODY MASS INDEX: 42.51 KG/M2

## 2023-02-12 LAB
ALBUMIN SERPL-MCNC: 3.9 G/DL (ref 3.5–5.2)
ALP BLD-CCNC: 95 U/L (ref 35–104)
ALT SERPL-CCNC: 26 U/L (ref 0–32)
ANION GAP SERPL CALCULATED.3IONS-SCNC: 13 MMOL/L (ref 7–16)
AST SERPL-CCNC: 23 U/L (ref 0–31)
BASOPHILS ABSOLUTE: 0.05 E9/L (ref 0–0.2)
BASOPHILS RELATIVE PERCENT: 0.6 % (ref 0–2)
BILIRUB SERPL-MCNC: 2.2 MG/DL (ref 0–1.2)
BILIRUBIN DIRECT: <0.2 MG/DL (ref 0–0.3)
BILIRUBIN, INDIRECT: ABNORMAL MG/DL (ref 0–1)
BUN BLDV-MCNC: 23 MG/DL (ref 6–23)
CALCIUM SERPL-MCNC: 8.8 MG/DL (ref 8.6–10.2)
CHLORIDE BLD-SCNC: 90 MMOL/L (ref 98–107)
CO2: 30 MMOL/L (ref 22–29)
CREAT SERPL-MCNC: 4.8 MG/DL (ref 0.5–1)
EOSINOPHILS ABSOLUTE: 0.68 E9/L (ref 0.05–0.5)
EOSINOPHILS RELATIVE PERCENT: 8.5 % (ref 0–6)
GFR SERPL CREATININE-BSD FRML MDRD: 9 ML/MIN/1.73
GLUCOSE BLD-MCNC: 347 MG/DL (ref 74–99)
HCT VFR BLD CALC: 32.2 % (ref 34–48)
HEMOGLOBIN: 10.1 G/DL (ref 11.5–15.5)
IMMATURE GRANULOCYTES #: 0.04 E9/L
IMMATURE GRANULOCYTES %: 0.5 % (ref 0–5)
LYMPHOCYTES ABSOLUTE: 1.35 E9/L (ref 1.5–4)
LYMPHOCYTES RELATIVE PERCENT: 16.8 % (ref 20–42)
MAGNESIUM: 1.9 MG/DL (ref 1.6–2.6)
MCH RBC QN AUTO: 31.1 PG (ref 26–35)
MCHC RBC AUTO-ENTMCNC: 31.4 % (ref 32–34.5)
MCV RBC AUTO: 99.1 FL (ref 80–99.9)
METER GLUCOSE: 215 MG/DL (ref 74–99)
METER GLUCOSE: 329 MG/DL (ref 74–99)
MONOCYTES ABSOLUTE: 0.75 E9/L (ref 0.1–0.95)
MONOCYTES RELATIVE PERCENT: 9.3 % (ref 2–12)
NEUTROPHILS ABSOLUTE: 5.17 E9/L (ref 1.8–7.3)
NEUTROPHILS RELATIVE PERCENT: 64.3 % (ref 43–80)
PDW BLD-RTO: 14.6 FL (ref 11.5–15)
PHOSPHORUS: 2.7 MG/DL (ref 2.5–4.5)
PLATELET # BLD: 158 E9/L (ref 130–450)
PMV BLD AUTO: 10.5 FL (ref 7–12)
POTASSIUM SERPL-SCNC: 4.5 MMOL/L (ref 3.5–5)
PRO-BNP: ABNORMAL PG/ML (ref 0–125)
RBC # BLD: 3.25 E12/L (ref 3.5–5.5)
SODIUM BLD-SCNC: 133 MMOL/L (ref 132–146)
TOTAL PROTEIN: 7.4 G/DL (ref 6.4–8.3)
WBC # BLD: 8 E9/L (ref 4.5–11.5)

## 2023-02-12 PROCEDURE — 80048 BASIC METABOLIC PNL TOTAL CA: CPT

## 2023-02-12 PROCEDURE — 6370000000 HC RX 637 (ALT 250 FOR IP): Performed by: NURSE PRACTITIONER

## 2023-02-12 PROCEDURE — 85025 COMPLETE CBC W/AUTO DIFF WBC: CPT

## 2023-02-12 PROCEDURE — 6370000000 HC RX 637 (ALT 250 FOR IP): Performed by: INTERNAL MEDICINE

## 2023-02-12 PROCEDURE — 83880 ASSAY OF NATRIURETIC PEPTIDE: CPT

## 2023-02-12 PROCEDURE — 2700000000 HC OXYGEN THERAPY PER DAY

## 2023-02-12 PROCEDURE — 83735 ASSAY OF MAGNESIUM: CPT

## 2023-02-12 PROCEDURE — 84100 ASSAY OF PHOSPHORUS: CPT

## 2023-02-12 PROCEDURE — 6360000002 HC RX W HCPCS: Performed by: INTERNAL MEDICINE

## 2023-02-12 PROCEDURE — 82962 GLUCOSE BLOOD TEST: CPT

## 2023-02-12 PROCEDURE — 2500000003 HC RX 250 WO HCPCS: Performed by: INTERNAL MEDICINE

## 2023-02-12 PROCEDURE — 36415 COLL VENOUS BLD VENIPUNCTURE: CPT

## 2023-02-12 PROCEDURE — 71046 X-RAY EXAM CHEST 2 VIEWS: CPT

## 2023-02-12 PROCEDURE — 80076 HEPATIC FUNCTION PANEL: CPT

## 2023-02-12 RX ORDER — ATORVASTATIN CALCIUM 10 MG/1
10 TABLET, FILM COATED ORAL DAILY
Qty: 90 TABLET | Refills: 1 | Status: SHIPPED | OUTPATIENT
Start: 2023-02-12

## 2023-02-12 RX ORDER — AMLODIPINE BESYLATE 2.5 MG/1
2.5 TABLET ORAL DAILY
Qty: 30 TABLET | Refills: 3 | Status: SHIPPED | OUTPATIENT
Start: 2023-02-13

## 2023-02-12 RX ADMIN — INSULIN LISPRO 4 UNITS: 100 INJECTION, SOLUTION INTRAVENOUS; SUBCUTANEOUS at 13:00

## 2023-02-12 RX ADMIN — INSULIN LISPRO 3 UNITS: 100 INJECTION, SOLUTION INTRAVENOUS; SUBCUTANEOUS at 06:58

## 2023-02-12 RX ADMIN — CALCIUM ACETATE 667 MG: 667 CAPSULE ORAL at 10:58

## 2023-02-12 RX ADMIN — HEPARIN SODIUM 5000 UNITS: 10000 INJECTION INTRAVENOUS; SUBCUTANEOUS at 10:57

## 2023-02-12 RX ADMIN — PANTOPRAZOLE SODIUM 40 MG: 40 TABLET, DELAYED RELEASE ORAL at 06:22

## 2023-02-12 RX ADMIN — HEPARIN SODIUM 5000 UNITS: 10000 INJECTION INTRAVENOUS; SUBCUTANEOUS at 06:30

## 2023-02-12 RX ADMIN — ATORVASTATIN CALCIUM 10 MG: 10 TABLET, FILM COATED ORAL at 09:31

## 2023-02-12 RX ADMIN — INSULIN LISPRO 4 UNITS: 100 INJECTION, SOLUTION INTRAVENOUS; SUBCUTANEOUS at 07:00

## 2023-02-12 RX ADMIN — INSULIN LISPRO 1 UNITS: 100 INJECTION, SOLUTION INTRAVENOUS; SUBCUTANEOUS at 13:00

## 2023-02-12 RX ADMIN — DIPHENHYDRAMINE HYDROCHLORIDE 25 MG: 50 INJECTION, SOLUTION INTRAMUSCULAR; INTRAVENOUS at 03:23

## 2023-02-12 ASSESSMENT — PAIN SCALES - GENERAL: PAINLEVEL_OUTOF10: 0

## 2023-02-12 NOTE — PROGRESS NOTES
When preforming skin assessment, pt refused assessment of JUSTIN feet stating \"I only take my socks off when showering, I like to keep them covered. \"

## 2023-02-12 NOTE — PROGRESS NOTES
PROGRESS NOTE      Patient Presents with/Seen in Consultation For      *Reason for Consult: endoscopy tomorrow  CHIEF COMPLAINT:  shortness of breath  Subjective:     Patient seen sitting up in chair, at [facility]. States she is fatigued, didn't sleep well. Tolerating diet. Denies any N/V, or abdominal pain. No BM today, +flatus. POC reviewed with the patient, all questions answered. Review of Systems  Aside from what was mentioned in the PMH and HPI, essentially unremarkable, all others negative. Objective:     BP (!) 118/57   Pulse 71   Temp 97 °F (36.1 °C) (Temporal)   Resp 18   Ht 5' 4\" (1.626 m)   Wt 249 lb (112.9 kg)   SpO2 100%   BMI 42.74 kg/m²     General appearance: alert, awake, up in chair, and cooperative, obese  Eyes: conjunctiva pale, sclera anicteric. PERRL.   Lungs: clear to auscultation bilaterally  Heart: regular rate and rhythm, no murmur, 2+ pulses; trace edema  Abdomen: soft, non-tender; bowel sounds normal; no masses  Extremities: extremities trace edema  Pulses: 2+ and symmetric  Skin: Skin color, texture, turgor normal.   Neurologic: Grossly normal    pantoprazole (PROTONIX) tablet 40 mg, QAM AC  diphenhydrAMINE (BENADRYL) injection 25 mg, Q6H PRN  atorvastatin (LIPITOR) tablet 10 mg, Daily  amLODIPine (NORVASC) tablet 2.5 mg, Daily  calcium acetate (PHOSLO) capsule 667 mg, TID WC  glucose chewable tablet 16 g, PRN  insulin glargine (LANTUS) injection vial 20 Units, Nightly  insulin lispro (HUMALOG) injection vial 4 Units, TID WC  midodrine (PROAMATINE) tablet 10 mg, Daily PRN  glucose chewable tablet 16 g, PRN  dextrose bolus 10% 125 mL, PRN   Or  dextrose bolus 10% 250 mL, PRN  glucagon (rDNA) injection 1 mg, PRN  dextrose 10 % infusion, Continuous PRN  insulin lispro (HUMALOG) injection vial 0-4 Units, TID WC  insulin lispro (HUMALOG) injection vial 0-4 Units, Nightly  heparin (porcine) injection 5,000 Units, Q8H  acetaminophen (TYLENOL) tablet 650 mg, Q4H PRN       Data Review  CBC:   Lab Results   Component Value Date/Time    WBC 8.0 02/12/2023 12:22 PM    RBC 3.25 02/12/2023 12:22 PM    HGB 10.1 02/12/2023 12:22 PM    HCT 32.2 02/12/2023 12:22 PM    MCV 99.1 02/12/2023 12:22 PM    MCH 31.1 02/12/2023 12:22 PM    MCHC 31.4 02/12/2023 12:22 PM    RDW 14.6 02/12/2023 12:22 PM     02/12/2023 12:22 PM    MPV 10.5 02/12/2023 12:22 PM     CMP:    Lab Results   Component Value Date/Time     02/12/2023 03:56 AM    K 4.5 02/12/2023 03:56 AM    K 4.2 08/23/2022 12:12 PM    CL 90 02/12/2023 03:56 AM    CO2 30 02/12/2023 03:56 AM    BUN 23 02/12/2023 03:56 AM    CREATININE 4.8 02/12/2023 03:56 AM    GFRAA 8 09/22/2022 12:14 PM    LABGLOM 9 02/12/2023 03:56 AM    GLUCOSE 347 02/12/2023 03:56 AM    PROT 7.4 02/12/2023 12:22 PM    LABALBU 3.9 02/12/2023 12:22 PM    CALCIUM 8.8 02/12/2023 03:56 AM    BILITOT 2.2 02/12/2023 12:22 PM    ALKPHOS 95 02/12/2023 12:22 PM    AST 23 02/12/2023 12:22 PM    ALT 26 02/12/2023 12:22 PM     Hepatic Function Panel:    Lab Results   Component Value Date/Time    ALKPHOS 95 02/12/2023 12:22 PM    ALT 26 02/12/2023 12:22 PM    AST 23 02/12/2023 12:22 PM    PROT 7.4 02/12/2023 12:22 PM    BILITOT 2.2 02/12/2023 12:22 PM    BILIDIR <0.2 02/12/2023 12:22 PM    IBILI see below 02/12/2023 12:22 PM    LABALBU 3.9 02/12/2023 12:22 PM     No components found for: CHLPL    Lab Results   Component Value Date    TRIG 148 02/08/2023    TRIG 143 01/31/2023    TRIG 173 (H) 01/18/2023       Lab Results   Component Value Date    HDL 43 02/08/2023    HDL 43 01/31/2023    HDL 41 01/18/2023       Lab Results   Component Value Date    LDLCALC 78 02/08/2023    LDLCALC 100 (H) 01/31/2023    LDLCALC 79 01/18/2023       Lab Results   Component Value Date    LABVLDL 30 02/08/2023    LABVLDL 29 01/31/2023    LABVLDL 35 01/18/2023      PT/INR:    Lab Results   Component Value Date/Time    PROTIME 13.0 02/08/2023 10:30 AM    INR 1.2 02/08/2023 10:30 AM     IRON:    Lab Results Component Value Date/Time    IRON 45 01/31/2023 06:35 AM     Iron Saturation:  No components found for: PERCENTFE  FERRITIN:    Lab Results   Component Value Date/Time    FERRITIN 1,357 01/31/2023 06:35 AM         Assessment:     Active Problems:  SOB  Anemia, normocytic  ESRD on hemodialysis   PE- was on Eliquis- on hold recently d/t GIB  DM  EGD 1/18/23-Grade B LA classification GERD and gastritis, minimal, H. pylori negative. Duodenum showed severe duodenitis with superficial ulceration. Biopsies were done, pending.   S/p IVC filter 1/20/23  Pleural effusions  R HF  Pneumonia   EGD 2/10/23:Esophagus:  GERD Stomach:  Gastritis Duodenum:  DUODENITIS    Plan:     Continue Protonix   Diet as tolerated today  Pulmonary & Cardiology following patient  Supportive care  Medical management per Primary, including pain management   Trend labs  OP colonoscopy too follow; already scheduled  OK to DC from GI POV; when OK with others      Discussed with Dr. Aleta Hatfield per Dr. Rodolfo Abraham, APRN - CNP  2/12/2023  10:30 AM For Dr. Therese Obregon

## 2023-02-12 NOTE — PROGRESS NOTES
[facility]  [address] [facility], [address], [address] [facility]      Pulmonary Progress Note      SUBJECTIVE:  sitting up in chair on 2 li NC, feeling stable with breathing, not much cough/congestion.     OBJECTIVE    Medications    Continuous Infusions:   dextrose         Scheduled Meds:   pantoprazole  40 mg Oral QAM AC    atorvastatin  10 mg Oral Daily    amLODIPine  2.5 mg Oral Daily    calcium acetate  667 mg Oral TID WC    insulin glargine  20 Units SubCUTAneous Nightly    insulin lispro  4 Units SubCUTAneous TID WC    insulin lispro  0-4 Units SubCUTAneous TID WC    insulin lispro  0-4 Units SubCUTAneous Nightly    heparin (porcine)  5,000 Units SubCUTAneous Q8H       PRN Meds:diphenhydrAMINE, glucose, midodrine, glucose, dextrose bolus **OR** dextrose bolus, glucagon (rDNA), dextrose, acetaminophen    Physical    VITALS:  BP (!) 118/57   Pulse 71   Temp 97 °F (36.1 °C) (Temporal)   Resp 18   Ht 5' 4\" (1.626 m)   Wt 249 lb (112.9 kg)   SpO2 100%   BMI 42.74 kg/m²     24HR INTAKE/OUTPUT:      Intake/Output Summary (Last 24 hours) at 2023 1114  Last data filed at 2023 1120  Gross per 24 hour   Intake 300 ml   Output 1500 ml   Net -1200 ml         24HR PULSE OXIMETRY RANGE:    SpO2  Av.3 %  Min: 98 %  Max: 100 %    General appearance: alert, appears stated age, and cooperative, obese  Lungs: rhonchi bibasilar minimal  Heart: regular rate and rhythm, S1, S2 normal, no murmur, click, rub or gallop  Abdomen: soft, non-tender; bowel sounds normal; no masses,  no organomegaly  Extremities: extremities normal, atraumatic, no cyanosis or edema  Neurologic: Mental status: Alert, oriented, thought content appropriate    Data    CBC:   Recent Labs     02/10/23  0410 23  1010   WBC 7.8  --    HGB 9.7* 9.6*   HCT 30.7* 31.1*   MCV 98.1  --      --          BMP:  Recent Labs     02/10/23  0410 23  0407 23  0356   NA 136 135 133   K 4.1 4.7 4.5   CL 92* 92* 90*   CO2 31* 31* 30*   PHOS 2.6 3.5 2.7   BUN 16 32* 23   CREATININE 3.8* 6.3* 4.8*      ALB:3,BILIDIR:3,BILITOT:3,ALKPHOS:3)@    PT/INR:   No results for input(s): PROTIME, INR in the last 72 hours. ABG:   No results for input(s): PH, PO2, PCO2, HCO3, BE, O2SAT, METHB, O2HB, COHB, O2CON, HHB, THB in the last 72 hours. Radiology/Other tests reviewed: CXR reviewed with decrease in right pleural effusion compared to previous    Assessment:     Principal Problem:    Acute on chronic heart failure with preserved ejection fraction (HCC)  Active Problems:    H/O heart artery stent    Diabetes mellitus (HCC)    End-stage renal disease on hemodialysis (HCC)    Lymphedema of both lower extremities    Anemia due to stage 5 chronic kidney disease ([facility])    S/P insertion of inferior vena caval filter    Essential hypertension    Obesity, Class III, BMI 40-49.9 (morbid obesity) ([facility] [address]    Aortic stenosis, mild    History of pulmonary embolus (PE)  Resolved Problems:    * No resolved hospital problems. *      Plan:       Cont with HD as per Renal, watch fluid balance  Decrease in effusion, observe for now, no need for thoracentesis  Cont with oxygen, taper as tolerated  No lung medications, observe  OOB to chair, ambulate as tolerated  Can be discharged from pulmonary pov      Time at the bedside, reviewing labs and radiographs, reviewing notes and consultations, discussing with staff and family was more than 35 minutes. Thanks for letting us see this patient in consultation. Please contact us with any questions. Office [phone] or after hours through [employer], x [phone].

## 2023-02-12 NOTE — PLAN OF CARE
Problem: Discharge Planning  Goal: Discharge to home or other facility with appropriate resources  2/12/2023 1241 by Vanesa Valderrama RN  Outcome: Completed  2/11/2023 2252 by Felipe Roman RN  Outcome: Progressing     Problem: Pain  Goal: Verbalizes/displays adequate comfort level or baseline comfort level  2/12/2023 1241 by Vanesa Valderrama RN  Outcome: Completed  2/11/2023 2252 by Felipe Roman RN  Outcome: Progressing     Problem: Safety - Adult  Goal: Free from fall injury  2/12/2023 1241 by Vanesa Valderrama RN  Outcome: Completed  2/11/2023 2252 by Felipe Roman RN  Outcome: Progressing     Problem: Chronic Conditions and Co-morbidities  Goal: Patient's chronic conditions and co-morbidity symptoms are monitored and maintained or improved  Outcome: Completed     Problem: ABCDS Injury Assessment  Goal: Absence of physical injury  Outcome: Completed

## 2023-02-12 NOTE — PROGRESS NOTES
PROGRESS  NOTE --                                                          INTERNAL  MEDICINE                                                                              I  PERSONALLY SAW , EXAMINED, AND CARED Shanique Gee, 2/12/2023     LABS, XRAY ,CHART, AND MEDICATIONS  REVIEWED BY ME       Chief complaint: Short of breath      2/9/2023-SUBJECTIVE: Haven Pizarro is alert awake and cooperative; oriented ×3. Denies any chest pain nausea emesis. Tolerating diet. No abdominal pain. Sitting in bedside chair; only real complaint is fatigue. I discussed with her results on chest x-ray; she states it was done before today's dialysis. Afebrile last 24 hours. Pulse 69 blood pressure 175/61. SPO2 100 on 2 L nasal cannula. Intake and output -2520 cc. BUN 18 creatinine 4.1. Fasting glucose 259 calcium 8.2. Ionized calcium done yesterday low at 1.09. LDL from yesterday 78; proBNP slightly improved 45,687. A1c 10.3. TSH 2.970. Hemoglobin 9.5 WBC 7.3. Molecular/PCR viral respiratory panel all not detected. Blood cultures pending. Chest x-ray from today pending. Hemodialysis performed yesterday with net removed 3000 cc. Pulmonary note from today appreciated. Feeling some better, hold off on thoracentesis EF further hemodialysis will help control and less of the effusion. Cardiology note from today appreciated; patient quite fatigued while on dialysis. Still short of breath with exertion. She believes she is having side effects from rosuvastatin and would prefer atorvastatin. Start low-dose amlodipine reviewed previous 2D echo determine right ventricular dynamics. Hematology note from today, continue hemodialysis. GI consult from 2/8 appreciated; consider resuming apixaban if ulcerations have healed.  note from today, will return home with home health care. GI note from today EGD planned in a.m. Patient underwent dialysis this morning with net removal of 2700 cc. Nephrology note from today appreciated. Refusing thoracentesis will consider if effusion is not improving. Scheduled for 3-hour UF WMD treatment tomorrow; continue IHD for removal of solute and volume. Physical therapy AMPAC score from today 20/24. SPO2 dropped to 85% on 2 L recovered when seated with rest.  Chest x-ray from today with following results--    FINDINGS:   Heart is mildly enlarged and there is borderline prominence of the pulmonary   vascularity. There is a moderate size right pleural effusion unchanged. Remainder of the lungs and pleural spaces are normal.       Impression:       Findings suggest some degree of CHF, unchanged        2/10/2023-patient seen in recovery after EGD performed. She is awake alert oriented x3. She is impressed because she is now able to flex her right knee which she had not been able to do since admission due to the edema. She has resigned herself to the fact she will be here over the weekend, pulmonary suggested repeat chest x-ray in 48 hours, if no improvement of pleural effusion then thoracentesis. Still short of breath with activity; no chest pain tolerating dialysis without difficulty. Brief op note following EGD with findings of GERD in the esophagus; gastritis in the stomach; duodenitis in the duodenum. I discussed with her at length, since she has no evidence of DVT in lower extremities, since she has IVC filter I would be reluctant to restart apixaban at this time in view of ongoing duodenitis. She agrees. She is not sure why she had such itching problems last evening, she thinks it might be from the shakes. She still having some itching at this time; she questions if amlodipine may be the culprit. I would think not. Afebrile last 24 hours. Blood pressure 110/60. SPO2 96 on 2 L nasal cannula. Intake and output -5220 cc.   BUN 16 creatinine 3.8 fasting glucose 199 lactic acid 1.6 magnesium 1.8 hemoglobin 9.7 WBC 7.8. Stool for occult blood negative. Patient was experiencing significant pruritus of her arms back and neck; no hives noted by nursing. Patient was given diphenhydramine 25 mg IV one-time. GI note today, scheduled for EGD today. Social service note, plan is home with home health care, [facility] home health care following. Pulmonary note from today, no lung medications repeat chest x-ray over the weekend to decide on IR thoracentesis next week; large body habitus may be too difficult to do at bedside. Patient underwent hemodialysis this a.m. with net removal of 1200 cc.      2/12/2022-patient sitting in bedside chair finishing her lunch. No chest pain no dyspnea. Appetite good. No dialysis scheduled for the next 48 hours. I spoke with pulmonary who reviewed her chest x-ray from this morning, effusion seems to be diminishing continue hemodialysis as a way to remove excess fluid. No reason for thoracentesis done by IR. Afebrile last 24 hours. Blood pressure 118/57. SPO2 100 on 2 L nasal cannula. Intake and output -7570 cc. BUN 23 creatinine 4.8. Fasting glucose 347. Phosphorus 2.7.  GI note from yesterday appreciated. Flatus no bowel movement. Change Protonix to oral daily. Outpatient colonoscopy. Okay to discharge from GI point of view. Patient underwent hemodialysis yesterday with net removal of 1200. CHF nurse navigator consult from yesterday, not a candidate due to dialysis. Pulmonary note from yesterday feeling better not much congestion. Watch fluid balance repeat chest x-ray in a.m. possible IR thoracentesis if significant effusion.       Objective:     PHYSICAL EXAM:    VS: BP (!) 118/57   Pulse 71   Temp 97 °F (36.1 °C) (Temporal)   Resp 18   Ht 5' 4\" (1.626 m)   Wt 249 lb (112.9 kg)   SpO2 100%   BMI 42.74 kg/m²     Labs:   CBC:   Lab Results   Component Value Date/Time    WBC 7.8 02/10/2023 04:10 AM    RBC 3.13 02/10/2023 04:10 AM    HGB 9.6 02/11/2023 10:10 AM    HCT 31.1 02/11/2023 10:10 AM    MCV 98.1 02/10/2023 04:10 AM    MCH 31.0 02/10/2023 04:10 AM    MCHC 31.6 02/10/2023 04:10 AM    RDW 14.6 02/10/2023 04:10 AM     02/10/2023 04:10 AM    MPV 10.2 02/10/2023 04:10 AM     CBC with Differential:    Lab Results   Component Value Date/Time    WBC 7.8 02/10/2023 04:10 AM    RBC 3.13 02/10/2023 04:10 AM    HGB 9.6 02/11/2023 10:10 AM    HCT 31.1 02/11/2023 10:10 AM     02/10/2023 04:10 AM    MCV 98.1 02/10/2023 04:10 AM    MCH 31.0 02/10/2023 04:10 AM    MCHC 31.6 02/10/2023 04:10 AM    RDW 14.6 02/10/2023 04:10 AM    NRBC 0.0 01/13/2022 04:38 AM    LYMPHOPCT 17.9 02/10/2023 04:10 AM    MONOPCT 9.6 02/10/2023 04:10 AM    BASOPCT 0.5 02/10/2023 04:10 AM    MONOSABS 0.75 02/10/2023 04:10 AM    LYMPHSABS 1.40 02/10/2023 04:10 AM    EOSABS 0.44 02/10/2023 04:10 AM    BASOSABS 0.04 02/10/2023 04:10 AM     Hemoglobin/Hematocrit:    Lab Results   Component Value Date/Time    HGB 9.6 02/11/2023 10:10 AM    HCT 31.1 02/11/2023 10:10 AM     CMP:    Lab Results   Component Value Date/Time     02/12/2023 03:56 AM    K 4.5 02/12/2023 03:56 AM    K 4.2 08/23/2022 12:12 PM    CL 90 02/12/2023 03:56 AM    CO2 30 02/12/2023 03:56 AM    BUN 23 02/12/2023 03:56 AM    CREATININE 4.8 02/12/2023 03:56 AM    GFRAA 8 09/22/2022 12:14 PM    LABGLOM 9 02/12/2023 03:56 AM    GLUCOSE 347 02/12/2023 03:56 AM    PROT 6.8 02/08/2023 08:50 AM    LABALBU 3.4 02/08/2023 08:50 AM    CALCIUM 8.8 02/12/2023 03:56 AM    BILITOT 0.4 02/08/2023 08:50 AM    ALKPHOS 89 02/08/2023 08:50 AM    AST 16 02/08/2023 08:50 AM    ALT 50 02/08/2023 08:50 AM     BMP:    Lab Results   Component Value Date/Time     02/12/2023 03:56 AM    K 4.5 02/12/2023 03:56 AM    K 4.2 08/23/2022 12:12 PM    CL 90 02/12/2023 03:56 AM    CO2 30 02/12/2023 03:56 AM    BUN 23 02/12/2023 03:56 AM    LABALBU 3.4 02/08/2023 08:50 AM    CREATININE 4.8 02/12/2023 03:56 AM    CALCIUM 8.8 02/12/2023 03:56 AM    GFRAA 8 09/22/2022 12:14 PM    LABGLOM 9 02/12/2023 03:56 AM    GLUCOSE 347 02/12/2023 03:56 AM     Hepatic Function Panel:    Lab Results   Component Value Date/Time    ALKPHOS 89 02/08/2023 08:50 AM    ALT 50 02/08/2023 08:50 AM    AST 16 02/08/2023 08:50 AM    PROT 6.8 02/08/2023 08:50 AM    BILITOT 0.4 02/08/2023 08:50 AM    BILIDIR <0.2 02/08/2023 08:50 AM    IBILI see below 02/08/2023 08:50 AM    LABALBU 3.4 02/08/2023 08:50 AM     Ionized Calcium:  No results found for: IONCA  Magnesium:    Lab Results   Component Value Date/Time    MG 1.9 02/12/2023 03:56 AM     Phosphorus:    Lab Results   Component Value Date/Time    PHOS 2.7 02/12/2023 03:56 AM     LDH:    Lab Results   Component Value Date/Time     01/11/2022 12:45 PM     Uric Acid:    Lab Results   Component Value Date/Time    LABURIC 5.1 02/08/2023 08:50 AM     PT/INR:    Lab Results   Component Value Date/Time    PROTIME 13.0 02/08/2023 10:30 AM    INR 1.2 02/08/2023 10:30 AM     Warfarin PT/INR:  No components found for: PTPATWAR, PTINRWAR  PTT:    Lab Results   Component Value Date/Time    APTT 34.5 01/17/2023 08:35 AM   [APTT}  Troponin:  No results found for: TROPONINI  Last 3 Troponin:  No results found for: TROPONINI  U/A:    Lab Results   Component Value Date/Time    COLORU Yellow 11/18/2021 03:19 AM    PROTEINU >=300 11/18/2021 03:19 AM    PHUR 6.0 11/18/2021 03:19 AM    WBCUA 1-3 11/18/2021 03:19 AM    RBCUA 0-1 11/18/2021 03:19 AM    BACTERIA FEW 11/18/2021 03:19 AM    CLARITYU Clear 11/18/2021 03:19 AM    SPECGRAV 1.020 11/18/2021 03:19 AM    LEUKOCYTESUR Negative 11/18/2021 03:19 AM    UROBILINOGEN 0.2 11/18/2021 03:19 AM    BILIRUBINUR Negative 11/18/2021 03:19 AM    BLOODU SMALL 11/18/2021 03:19 AM    GLUCOSEU 500 11/18/2021 03:19 AM     ABG:  No results found for: PH, PCO2, PO2, HCO3, BE, THGB, TCO2, O2SAT  HgBA1c:    Lab Results   Component Value Date/Time    LABA1C 10.3 02/08/2023 11:00 AM     FLP:    Lab Results Component Value Date/Time    TRIG 148 02/08/2023 08:50 AM    HDL 43 02/08/2023 08:50 AM    LDLCALC 78 02/08/2023 08:50 AM    LABVLDL 30 02/08/2023 08:50 AM     TSH:    Lab Results   Component Value Date/Time    TSH 2.970 02/08/2023 08:50 AM     VITAMIN B12: No components found for: B12  FOLATE:    Lab Results   Component Value Date/Time    FOLATE 10.9 02/08/2023 10:30 AM     IRON:    Lab Results   Component Value Date/Time    IRON 45 01/31/2023 06:35 AM     Iron Saturation:  No components found for: PERCENTFE  TIBC:    Lab Results   Component Value Date/Time    TIBC 173 01/31/2023 06:35 AM     FERRITIN:    Lab Results   Component Value Date/Time    FERRITIN 1,357 01/31/2023 06:35 AM        General appearance: Alert, Awake, Oriented times 3, no distress, nasal cannula oxygen in place, morbidly obese  Skin: Warm and dry ; no rashes  Head: Normocephalic. No masses, lesions or tenderness noted  Eyes: Conjunctivae pale, sclera white. PERRL,EOM-I  Ears: External ears normal  Nose/Sinuses: Nares normal. Septum midline. Mucosa normal. No drainage  Oropharynx: Oropharynx clear with no exudate seen  Neck: Supple. No jugular venous distension, lymphadenopathy or thyromegaly Trachea midline  Lungs: Decreased breath sounds right lung scattered rhonchi  Heart: S1 S2  Regular rate and rhythm. No rub, gallop; grade 1/6 systolic murmur second intercostal space  Abdomen: Soft, non-tender. BS normal. No masses, organomegaly; no rebound or guarding  Extremities: 2+ bilateral edema; now able to flex her right knee, less edema  Musculoskeletal: Muscular strength appears intact. Neuro:  No focal motor defects ; II-XII grossly intact .  MORRIS equally    TELEMETRY: REVIEWED--Telemetry: Sinus    ASSESSMENT:   Principal Problem:    Acute on chronic heart failure with preserved ejection fraction (HCC)  Active Problems:    H/O heart artery stent    Diabetes mellitus ([facility] [address]    End-stage renal disease on hemodialysis (HCC)    Lymphedema of both lower extremities    Aortic stenosis, mild    Anemia due to stage 5 chronic kidney disease ([address]    History of pulmonary embolus (PE)    S/P insertion of inferior vena caval filter    Essential hypertension    Obesity, Class III, BMI 40-49.9 (morbid obesity) ([address]  Resolved Problems:    * No resolved hospital problems. *      PLAN:  SEE ORDERS      RE  CHANGES AND FINDINGS   Medications reviewed with patient  GI prophylaxis  DVT prophylaxis  Consultants notes reviewed    Continue hemodialysis  Lantus 20 units nightly  Low-dose sliding scale insulin  Humalog 4 units 3 times daily  Rosuvastatin 5 mg  has been discontinued  Atorvastatin 10 mg daily has been started  She may need thoracentesis in view of persistent effusion  2/10/2023  EGD today  Diphenhydramine 25 mg IV every 6 hours if needed for pruritus  Amlodipine 2.5 mg daily  Atorvastatin 10 mg daily  Lantus 20 units nightly  Humalog 4 units 3 times daily with meals  Low-dose sliding scale insulin  Continue hemodialysis  Repeat chest x-ray 2/12/2022 for size of effusion  May need IR thoracentesis if effusion not significantly lower  2/11/2023  Med reconciliation completed  Prescriptions written  Follow-up Dr. Dl Arenas 1 week  Follow-up nephrology and pulmonary per their instructions  Resume Lantus 20 units nightly  Resume sliding scale insulin from home  Humalog 4 units 3 times daily with meals  Rosuvastatin 5 mg daily  Midodrine as needed days of dialysis 10 mg  Protonix 40 mg twice daily  Amlodipine 2.5 mg daily            Discussed with patient and nursing. Timothy Anne DO     10:07 AM     2/12/2023    TIME > 35 MINUTES    Please note that over 35 minutes was spent .   Greater than 51% includes interviewing patient and reviewing chart; performing medical examination; ordering medications, tests and/or procedures; formulating assessment plan, reviewing rationale for the above recommendations; reviewing available records, results of all previously ordered tests and procedures and current problem list; counseling/educating the patient; coordinating care with other healthcare professionals; communicating results to the patient's family/caregiver; documenting clinical information in the electronic record                ------------  INFORMATION  -----------      DIET:ADULT DIET; Regular; Low Sodium (2 gm)        Allergies   Allergen Reactions    Pcn [Penicillins] Shortness Of Breath and Rash    Benzonatate Other (See Comments)     \"PATIENTS STATES \"IT WAS LIKE I WAS DRUNK. \"    Morphine Itching and Rash    Sodium Hypochlorite Nausea And Vomiting and Rash     AKA BLEACH. RASH FROM SKIN EXPOSURE          MEDICATION SIDE EFFECTS:none       SCHEDULED MEDS:                                 Scheduled Meds:   pantoprazole  40 mg Oral QAM AC    atorvastatin  10 mg Oral Daily    amLODIPine  2.5 mg Oral Daily    calcium acetate  667 mg Oral TID WC    insulin glargine  20 Units SubCUTAneous Nightly    insulin lispro  4 Units SubCUTAneous TID WC    insulin lispro  0-4 Units SubCUTAneous TID WC    insulin lispro  0-4 Units SubCUTAneous Nightly    heparin (porcine)  5,000 Units SubCUTAneous Q8H       Continuous Infusions:   dextrose           Data:       Intake/Output Summary (Last 24 hours) at 2/12/2023 1007  Last data filed at 2/11/2023 1120  Gross per 24 hour   Intake 300 ml   Output 1500 ml   Net -1200 ml       Wt Readings from Last 3 Encounters:   02/12/23 249 lb (112.9 kg)   01/31/23 260 lb 5.8 oz (118.1 kg)   01/30/23 262 lb (118.8 kg)       Labs: Additional    GLUCOSE:No results for input(s): POCGLU in the last 72 hours. BNP:No results found for: BNP    CRP: No results for input(s): CRP in the last 72 hours. ESR:No results for input(s): SEDRATE in the last 72 hours. RADIOLOGY: REVIEWED AVAILABLE REPORT  XR CHEST PORTABLE   Final Result   Findings suggest some degree of CHF, unchanged         CTA PULMONARY W CONTRAST   Final Result   1.  Limited examination due to motion and suboptimal timing of contrast.  No   large central or proximal segmental pulmonary arterial embolism. Pulmonary   embolism seen on prior is not definitively visualized. Repeat CTA may be   helpful for further evaluation. 2. Increased opacities in right lower lobe suggesting atelectasis and   pneumonia. 3. New right pleural effusion. 4. Pulmonary vascular congestion and findings which could indicate right   heart failure. US DUP LOWER EXTREMITIES BILATERAL VENOUS   Final Result   No visible DVT in either lower extremity. XR CHEST PORTABLE   Final Result   1. Cardiomegaly. There are no findings of CHF   2. Possible bibasilar airspace disease and or pleural effusions.          XR CHEST (2 VW)    (Results Pending)         Flaquita Mccoy DO    10:07 AM     2/12/2023      Voice recognition software used for dictation

## 2023-02-13 ENCOUNTER — TELEPHONE (OUTPATIENT)
Dept: PRIMARY CARE CLINIC | Age: 68
End: 2023-02-13

## 2023-02-13 LAB
BLOOD CULTURE, ROUTINE: NORMAL
CULTURE, BLOOD 2: NORMAL

## 2023-02-13 NOTE — TELEPHONE ENCOUNTER
Kartik Louie with [address] called needs to know what pts sliding scale is for her Humalog    [phone]

## 2023-02-14 NOTE — DISCHARGE SUMMARY
DISCHARGE SUMMARY  Patient ID:  Nichole Fitzgerald  [id number]  78 y.o.  1955    Admit date: 2/7/2023      Discharge date : 2/12/2023      Admission Diagnoses: Shortness of breath [R06.02]  ESRD (end stage renal disease) on dialysis ([facility] [N18.6, Z99.2]  Acute respiratory failure with hypoxia ([facility]) [J96.01]  Acute on chronic diastolic CHF (congestive heart failure) ([facility] [I50.33]  Single subsegmental pulmonary embolism without acute cor pulmonale ([facility]) [I26.93]    Discharge Diagnosis  Principal Problem:    Acute on chronic heart failure with preserved ejection fraction ([facility]  Active Problems:    H/O heart artery stent    Diabetes mellitus ([facility]    End-stage renal disease on hemodialysis ([facility])    Lymphedema of both lower extremities    Aortic stenosis, mild    Anemia due to stage 5 chronic kidney disease ([facility])    History of pulmonary embolus (PE)    S/P insertion of inferior vena caval filter    Essential hypertension    Obesity, Class III, BMI 40-49.9 (morbid obesity) ([facility]  Resolved Problems:    * No resolved hospital problems. *      Hospital Course:       CHIEF COMPLAINT: Short of breath     History of Present Illness: 61-year-old female patient of Dr. Tk Ruiz I am asked to admit and follow. History obtained from patient as well as extensive review electronic record; seen today while in dialysis. Patient with known end-stage renal disease on hemodialysis. Presented to the ED with shortness of breath chest pressure and \"not feeling well. \"  Patient is normally on 2 L nasal cannula oxygen. On 2/5/2023 she had fever with nausea and missed hemodialysis that day. Shortness of breath and cough without much sputum production which progressed. At hemodialysis yesterday she was found to have SPO2 of 80. Nurse practitioner prompted her to come to the ED.   Because of her previous history of DVT she underwent ultrasound duplex of lower extremities with following results--          FINDINGS:   The visualized veins of the right and left lower extremities are patent and   free of echogenic thrombus. The veins demonstrate good compressibility with   normal color flow study and spectral analysis. Note that evaluation of the   calf veins in both legs is limited due to patient body habitus. Impression:       No visible DVT in either lower extremity      She also had prior history of pulmonary emboli which occurred 12/3/2022. CT of the chest at that time as follows--     Impression   1. Pulmonary embolus seen within the segmental and subsegmental pulmonary   arteries of the medial basal segment and posterior basal segment of the right   lower lobe. 2. There are no findings of right ventricular strain   3. There is no pulmonary embolus seen within the pulmonary arteries of the   left lung   4. Patchy airspace disease within the right lower lobe which could represent   pneumonia or possibly an early pulmonary infarct   5. Linear atelectasis seen within the left lung base   6. Small amount of pleural fluid seen within the right major fissure. At the time of that admission she was placed on apixaban 5 mg twice daily. She presented to the ED on 1/17/2023 with complaints of hematemesis. She underwent EGD by GI service on 1/18/2023 with following findings--     The scope was then advanced through the gastroesophageal junction into  the gastric body, along the greater curvature. Evaluation of the body  of the stomach showed minimal gastritis. Biopsies were taken from this  area to rule out Helicobacter pylori infection. The scope was then advanced through the pylorus into the duodenal bulb  and second portion of the duodenum and evaluation showed severe  duodenitis with edema and superficial ulceration. Biopsies were done to  rule out sprue.   The scope was then retrieved and retroflexed in the  prepyloric antrum, with thorough evaluation of the cardiac and fundal  portions of the stomach, which appeared to be within normal limits. 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 Due to the above findings the apixaban was discontinued. She then underwent placement of IVC filter on 1/20/2023. --She underwent CTA of lungs in the ED yesterday with following results--     Impression   1. Limited examination due to motion and suboptimal timing of contrast.  No   large central or proximal segmental pulmonary arterial embolism. Pulmonary   embolism seen on prior is not definitively visualized. Repeat CTA may be   helpful for further evaluation. 2. Increased opacities in right lower lobe suggesting atelectasis and   pneumonia. 3. New right pleural effusion. 4. Pulmonary vascular congestion and findings which could indicate right   heart failure. --In the ED BUN of 15 creatinine 3.5. Glucose 181. proBNP 52,305; had been 30,524 on 1/31/2023. She underwent 2D echo on 12/3/2022 with following results--      Summary   Left ventricle grossly normal in size. Mild left ventricular concentric hypertrophy noted. Normal LV segmental wall motion. Estimated left ventricular ejection fraction is 58±5%. Does not meet 50% diagnostic criteria for diastolic dysfunction. The LAESV Index is <34ml/m2. Borderline dilated right ventricle. TAPSE is normal   Mild aortic stenosis is present. Aortic valve dimensionless valve index . 41   Physiologic and/or trace tricuspid regurgitation. RVSP is 39 mmHg. Technically fair quality study. Technically difficult study due to patient''s body habitus. No comparison study available. Suggest clinical correlation. -- In the ED hemoglobin 9.2 WC 9.9. Molecular/viral respiratory panel all not detected. --Fasting glucose today 152; remainder of labs pending.  -- Patient developed diabetes approximately age 49. She had been on metformin never on insulin until 12/2022.   At the time of the 12/2022 admission she was taking nothing for her diabetes, simply watching her diet. --Smoking approximately  quit  1 pack daily 15 years  --Mother  age 62 acute MI, father  age 62 CVA  --Coronary artery disease with PTCA PCI done while living in [place],   --Hemodialysis she states started 2021; [facility] states 2021  --Denies any rheumatic fever scarlet fever polio diphtheria cancer  --Seen by pulmonary in consult today; continue hemodialysis, repeat chest x-ray tomorrow and see if there is any need for thoracentesis. No lung medications needed. -- GI consult from today appreciated. Continue Protonix twice daily, stool for occult blood. EGD to be scheduled for 2/10/2023, orders have been placed. --Physical therapy AMPAC score from today . Occupational Therapy score . --Nephrology consult from today additional dialysis treatment today evaluate for additional treatments on a daily basis. Otherwise continue normal  schedule    2023-SUBJECTIVE: Dennise Kwok is alert awake and cooperative; oriented ×3. Denies any chest pain nausea emesis. Tolerating diet. No abdominal pain. Sitting in bedside chair; only real complaint is fatigue. I discussed with her results on chest x-ray; she states it was done before today's dialysis. Afebrile last 24 hours. Pulse 69 blood pressure 175/61. SPO2 100 on 2 L nasal cannula. Intake and output -2520 cc. BUN 18 creatinine 4.1. Fasting glucose 259 calcium 8.2. Ionized calcium done yesterday low at 1.09. LDL from yesterday 78; proBNP slightly improved 45,687. A1c 10.3. TSH 2.970. Hemoglobin 9.5 WBC 7.3. Molecular/PCR viral respiratory panel all not detected. Blood cultures pending. Chest x-ray from today pending. Hemodialysis performed yesterday with net removed 3000 cc. Pulmonary note from today appreciated.   Feeling some better, hold off on thoracentesis EF further hemodialysis will help control and less of the effusion. Cardiology note from today appreciated; patient quite fatigued while on dialysis. Still short of breath with exertion. She believes she is having side effects from rosuvastatin and would prefer atorvastatin. Start low-dose amlodipine reviewed previous 2D echo determine right ventricular dynamics. Hematology note from today, continue hemodialysis. GI consult from 2/8 appreciated; consider resuming apixaban if ulcerations have healed.  note from today, will return home with home health care. GI note from today EGD planned in a.m. Patient underwent dialysis this morning with net removal of 2700 cc. Nephrology note from today appreciated. Refusing thoracentesis will consider if effusion is not improving. Scheduled for 3-hour UF WMD treatment tomorrow; continue IHD for removal of solute and volume. Physical therapy AMPAC score from today 20/24. SPO2 dropped to 85% on 2 L recovered when seated with rest.  Chest x-ray from today with following results--          FINDINGS:   Heart is mildly enlarged and there is borderline prominence of the pulmonary   vascularity. There is a moderate size right pleural effusion unchanged. Remainder of the lungs and pleural spaces are normal.        Impression:       Findings suggest some degree of CHF, unchanged          2/10/2023-patient seen in recovery after EGD performed. She is awake alert oriented x3. She is impressed because she is now able to flex her right knee which she had not been able to do since admission due to the edema. She has resigned herself to the fact she will be here over the weekend, pulmonary suggested repeat chest x-ray in 48 hours, if no improvement of pleural effusion then thoracentesis. Still short of breath with activity; no chest pain tolerating dialysis without difficulty. Brief op note following EGD with findings of GERD in the esophagus; gastritis in the stomach; duodenitis in the duodenum.   I discussed with her at length, since she has no evidence of DVT in lower extremities, since she has IVC filter I would be reluctant to restart apixaban at this time in view of ongoing duodenitis. She agrees. She is not sure why she had such itching problems last evening, she thinks it might be from the shakes. She still having some itching at this time; she questions if amlodipine may be the culprit. I would think not. Afebrile last 24 hours. Blood pressure 110/60. SPO2 96 on 2 L nasal cannula. Intake and output -5220 cc. BUN 16 creatinine 3.8 fasting glucose 199 lactic acid 1.6 magnesium 1.8 hemoglobin 9.7 WBC 7.8. Stool for occult blood negative. Patient was experiencing significant pruritus of her arms back and neck; no hives noted by nursing. Patient was given diphenhydramine 25 mg IV one-time. GI note today, scheduled for EGD today. Social service note, plan is home with home health care, [facility] home health care following. Pulmonary note from today, no lung medications repeat chest x-ray over the weekend to decide on IR thoracentesis next week; large body habitus may be too difficult to do at bedside. Patient underwent hemodialysis this a.m. with net removal of 1200 cc.        2/12/2022-patient sitting in bedside chair finishing her lunch. No chest pain no dyspnea. Appetite good. No dialysis scheduled for the next 48 hours. I spoke with pulmonary who reviewed her chest x-ray from this morning, effusion seems to be diminishing continue hemodialysis as a way to remove excess fluid. No reason for thoracentesis done by IR. Afebrile last 24 hours. Blood pressure 118/57. SPO2 100 on 2 L nasal cannula. Intake and output -7570 cc. BUN 23 creatinine 4.8. Fasting glucose 347. Phosphorus 2.7.  GI note from yesterday appreciated. Flatus no bowel movement. Change Protonix to oral daily. Outpatient colonoscopy. Okay to discharge from GI point of view.   Patient underwent hemodialysis yesterday with net removal of 1200. CHF nurse navigator consult from yesterday, not a candidate due to dialysis. Pulmonary note from yesterday feeling better not much congestion. Watch fluid balance repeat chest x-ray in a.m. possible IR thoracentesis if significant effusion. I discussed chest x-ray with pulmonary, they believe effusion is decreasing no reason for thoracentesis may discharge. GI note from today, okay for discharge colonoscopy already scheduled as an outpatient.       Hospital orders:    Medications discussed with patient  GI prophylaxis  DVT prophylaxis  Consultants notes reviewed  Cardiology's been consulted regarding the heart failure preserved ejection fraction  Nephrology consulted for hemodialysis  PhosLo 667 1 capsule 3 times daily  Heparin 5000 units subcu every 8 hours  Lantus 20 units nightly  Low-dose sliding scale insulin  Humalog 4 units 3 times daily with meals  Rosuvastatin 5 mg daily  Consult GI for EGD  EGD scheduled for 2/10/2023  She has appointment to follow-up with endocrinology on 2/13/2023      RE  CHANGES AND FINDINGS   Medications reviewed with patient  GI prophylaxis  DVT prophylaxis  Consultants notes reviewed    Continue hemodialysis  Lantus 20 units nightly  Low-dose sliding scale insulin  Humalog 4 units 3 times daily  Rosuvastatin 5 mg  has been discontinued  Atorvastatin 10 mg daily has been started  She may need thoracentesis in view of persistent effusion  2/10/2023  EGD today  Diphenhydramine 25 mg IV every 6 hours if needed for pruritus  Amlodipine 2.5 mg daily  Atorvastatin 10 mg daily  Lantus 20 units nightly  Humalog 4 units 3 times daily with meals  Low-dose sliding scale insulin  Continue hemodialysis  Repeat chest x-ray 2/12/2022 for size of effusion  May need IR thoracentesis if effusion not significantly lower      Instructions at discharge:    2/12/2023  Med reconciliation completed  Prescriptions written  Follow-up Dr. Anthony Benson 1 week  Follow-up nephrology and pulmonary per their instructions  Resume Lantus 20 units nightly  Resume sliding scale insulin from home  Humalog 4 units 3 times daily with meals  Rosuvastatin 5 mg daily  Midodrine as needed days of dialysis 10 mg  Protonix 40 mg twice daily  Amlodipine 2.5 mg daily      Condition at DISCHARGE : Jued 1878     Discharged to:  HOME    Discharge Instructions: Medications reviewed with patient    Consults:cardiology, pulmonary/intensive care, GI, nephrology, and CHF nurse navigator     Past Medical Hx :   Past Medical History:   Diagnosis Date    Acute on chronic heart failure with preserved ejection fraction ([address] 2/7/2023    Acute pulmonary embolism ([address] 12/03/2022    Anemia due to stage 5 chronic kidney disease, not on chronic dialysis ([address] 2/8/2023    Anemia in CKD (chronic kidney disease) 11/30/2021    Anxiety and depression 01/19/2022    Aortic stenosis, mild 2/8/2023 12/2022    At high risk for falls 01/19/2022    Brain aneurysm     CLABSI (central line-associated bloodstream infection) 11/19/2021    Cough 01/19/2022    Diabetes mellitus ([address] 2006    Diabetes mellitus type 2 with complications ([address] 71/87/7627    Dizziness on standing 01/19/2022    End-stage renal disease on hemodialysis ([address] 01/19/2023    ESRD (end stage renal disease) ([address] 11/19/2021    ESRD on hemodialysis ([address] 11/19/2021    Essential hypertension 11/30/2021    Fever, unspecified     Gastrointestinal hemorrhage 11/30/2021    H/O heart artery stent 2015    Done in [place]    History of Clostridioides difficile colitis 01/19/2022    History of pulmonary embolus (PE) 2/8/2023 12/2022    Hyperlipidemia     Hypertension     Leg swelling 01/19/2023    Lymphedema of both lower extremities 01/19/2023    MSSA bacteremia 11/18/2021    Nausea & vomiting 11/18/2021    Obesity, Class III, BMI 40-49.9 (morbid obesity) ([address] 17/06/1961    Periumbilical abdominal pain     S/P insertion of inferior vena caval filter 01/20/2023       Past Surgical Hx :  Past Surgical History:   Procedure Laterality Date    CARDIAC CATHETERIZATION  2015    Done in [address]      COLONOSCOPY  2017    Negative findings    DIALYSIS FISTULA CREATION Left 01/28/2022    REVISION AV FISTULA LEFT ARM performed by Stacy Hendricks MD at [address]    IVC FILTER INSERTION  01/20/2023    DVT and pulmonary emboli, bleeding duodenal ulcer    PTCA  2015    PTCA [facility]    UPPER GASTROINTESTINAL ENDOSCOPY N/A 12/01/2021    EGD BIOPSY performed by Kayley Camarillo MD at [address] [place] [address] 01/18/2023    EGD BIOPSY performed by Kayley Camarillo MD at [place] [address] N/A 2/10/2023    EGD ESOPHAGOGASTRODUODENOSCOPY performed by Kayley Camarillo MD at [address] [place] [address] N/A 11/24/2021    CATHETER INSERTION  TUNNELED HEMODIALYSIS, WITH REMOVAL OF TEMPORARY CATHETER performed by Stacy Hendricks MD at [address] [place] [address] Rd:   CBC:   Lab Results   Component Value Date/Time    WBC 8.0 02/12/2023 12:22 PM    RBC 3.25 02/12/2023 12:22 PM    HGB 10.1 02/12/2023 12:22 PM    HCT 32.2 02/12/2023 12:22 PM    MCV 99.1 02/12/2023 12:22 PM    MCH 31.1 02/12/2023 12:22 PM    MCHC 31.4 02/12/2023 12:22 PM    RDW 14.6 02/12/2023 12:22 PM     02/12/2023 12:22 PM    MPV 10.5 02/12/2023 12:22 PM     CBC with Differential:    Lab Results   Component Value Date/Time    WBC 8.0 02/12/2023 12:22 PM    RBC 3.25 02/12/2023 12:22 PM    HGB 10.1 02/12/2023 12:22 PM    HCT 32.2 02/12/2023 12:22 PM     02/12/2023 12:22 PM    MCV 99.1 02/12/2023 12:22 PM    MCH 31.1 02/12/2023 12:22 PM    MCHC 31.4 02/12/2023 12:22 PM    RDW 14.6 02/12/2023 12:22 PM    NRBC 0.0 01/13/2022 04:38 AM    LYMPHOPCT 16.8 02/12/2023 12:22 PM    MONOPCT 9.3 02/12/2023 12:22 PM    BASOPCT 0.6 02/12/2023 12:22 PM    MONOSABS 0.75 02/12/2023 12:22 PM LYMPHSABS 1.35 02/12/2023 12:22 PM    EOSABS 0.68 02/12/2023 12:22 PM    BASOSABS 0.05 02/12/2023 12:22 PM     Hemoglobin/Hematocrit:    Lab Results   Component Value Date/Time    HGB 10.1 02/12/2023 12:22 PM    HCT 32.2 02/12/2023 12:22 PM     CMP:    Lab Results   Component Value Date/Time     02/12/2023 03:56 AM    K 4.5 02/12/2023 03:56 AM    K 4.2 08/23/2022 12:12 PM    CL 90 02/12/2023 03:56 AM    CO2 30 02/12/2023 03:56 AM    BUN 23 02/12/2023 03:56 AM    CREATININE 4.8 02/12/2023 03:56 AM    GFRAA 8 09/22/2022 12:14 PM    LABGLOM 9 02/12/2023 03:56 AM    GLUCOSE 347 02/12/2023 03:56 AM    PROT 7.4 02/12/2023 12:22 PM    LABALBU 3.9 02/12/2023 12:22 PM    CALCIUM 8.8 02/12/2023 03:56 AM    BILITOT 2.2 02/12/2023 12:22 PM    ALKPHOS 95 02/12/2023 12:22 PM    AST 23 02/12/2023 12:22 PM    ALT 26 02/12/2023 12:22 PM     BMP:    Lab Results   Component Value Date/Time     02/12/2023 03:56 AM    K 4.5 02/12/2023 03:56 AM    K 4.2 08/23/2022 12:12 PM    CL 90 02/12/2023 03:56 AM    CO2 30 02/12/2023 03:56 AM    BUN 23 02/12/2023 03:56 AM    LABALBU 3.9 02/12/2023 12:22 PM    CREATININE 4.8 02/12/2023 03:56 AM    CALCIUM 8.8 02/12/2023 03:56 AM    GFRAA 8 09/22/2022 12:14 PM    LABGLOM 9 02/12/2023 03:56 AM    GLUCOSE 347 02/12/2023 03:56 AM     Hepatic Function Panel:    Lab Results   Component Value Date/Time    ALKPHOS 95 02/12/2023 12:22 PM    ALT 26 02/12/2023 12:22 PM    AST 23 02/12/2023 12:22 PM    PROT 7.4 02/12/2023 12:22 PM    BILITOT 2.2 02/12/2023 12:22 PM    BILIDIR <0.2 02/12/2023 12:22 PM    IBILI see below 02/12/2023 12:22 PM    LABALBU 3.9 02/12/2023 12:22 PM     Ionized Calcium:  No results found for: IONCA  Magnesium:    Lab Results   Component Value Date/Time    MG 1.9 02/12/2023 03:56 AM     Phosphorus:    Lab Results   Component Value Date/Time    PHOS 2.7 02/12/2023 03:56 AM     LDH:    Lab Results   Component Value Date/Time     01/11/2022 12:45 PM     Uric Acid:    Lab Results   Component Value Date/Time    LABURIC 5.1 02/08/2023 08:50 AM     PT/INR:    Lab Results   Component Value Date/Time    PROTIME 13.0 02/08/2023 10:30 AM    INR 1.2 02/08/2023 10:30 AM     Warfarin PT/INR:  No components found for: PTPATWAR, PTINRWAR  PTT:    Lab Results   Component Value Date/Time    APTT 34.5 01/17/2023 08:35 AM   [APTT}  Troponin:  No results found for: TROPONINI  Last 3 Troponin:  No results found for: TROPONINI  U/A:    Lab Results   Component Value Date/Time    COLORU Yellow 11/18/2021 03:19 AM    PROTEINU >=300 11/18/2021 03:19 AM    PHUR 6.0 11/18/2021 03:19 AM    WBCUA 1-3 11/18/2021 03:19 AM    RBCUA 0-1 11/18/2021 03:19 AM    BACTERIA FEW 11/18/2021 03:19 AM    CLARITYU Clear 11/18/2021 03:19 AM    SPECGRAV 1.020 11/18/2021 03:19 AM    LEUKOCYTESUR Negative 11/18/2021 03:19 AM    UROBILINOGEN 0.2 11/18/2021 03:19 AM    BILIRUBINUR Negative 11/18/2021 03:19 AM    BLOODU SMALL 11/18/2021 03:19 AM    GLUCOSEU 500 11/18/2021 03:19 AM     ABG:  No results found for: PH, PCO2, PO2, HCO3, BE, THGB, TCO2, O2SAT  HgBA1c:    Lab Results   Component Value Date/Time    LABA1C 10.3 02/08/2023 11:00 AM     FLP:    Lab Results   Component Value Date/Time    TRIG 148 02/08/2023 08:50 AM    HDL 43 02/08/2023 08:50 AM    LDLCALC 78 02/08/2023 08:50 AM    LABVLDL 30 02/08/2023 08:50 AM     TSH:    Lab Results   Component Value Date/Time    TSH 2.970 02/08/2023 08:50 AM     VITAMIN B12: No components found for: B12  FOLATE:    Lab Results   Component Value Date/Time    FOLATE 10.9 02/08/2023 10:30 AM     IRON:    Lab Results   Component Value Date/Time    IRON 45 01/31/2023 06:35 AM     Iron Saturation:  No components found for: PERCENTFE  TIBC:    Lab Results   Component Value Date/Time    TIBC 173 01/31/2023 06:35 AM     FERRITIN:    Lab Results   Component Value Date/Time    FERRITIN 1,357 01/31/2023 06:35 AM          CBC:  Recent Labs     02/11/23  1010 02/12/23  1222   WBC  --  8.0   RBC  -- 3.25*   HGB 9.6* 10.1*   HCT 31.1* 32.2*   PLT  --  158   MCV  --  99.1   MCH  --  31.1   MCHC  --  31.4*   RDW  --  14.6   LYMPHOPCT  --  16.8*   MONOPCT  --  9.3   BASOPCT  --  0.6   MONOSABS  --  0.75   LYMPHSABS  --  1.35*   EOSABS  --  0.68*   BASOSABS  --  0.05          H & H :  Recent Labs     02/11/23  1010 02/12/23  1222   HGB 9.6* 10.1*       TSH:No results for input(s): TSH in the last 72 hours. GLUCOSE:No results for input(s): POCGLU in the last 72 hours. CMP:  Recent Labs     02/12/23  0356 02/12/23  1222     --    K 4.5  --    CL 90*  --    CO2 30*  --    BUN 23  --    CREATININE 4.8*  --    LABGLOM 9  --    GLUCOSE 347*  --    PROT  --  7.4   LABALBU  --  3.9   CALCIUM 8.8  --    BILITOT  --  2.2*   ALKPHOS  --  95   AST  --  23   ALT  --  26         BNP:No results found for: BNP    PROTIME/INR:No results for input(s): PROTIME, INR in the last 72 hours. CRP: No results for input(s): CRP in the last 72 hours. ESR:No results for input(s): SEDRATE in the last 72 hours. LIPASE , AMYLASE:  Lab Results   Component Value Date    LIPASE 43 01/17/2023      No results found for: AMYLASE    ABGs: No results found for: PHART, PO2ART, [id number]    CARDIAC: No results for input(s): CKTOTAL, CKMB, CKMBINDEX, TROPONINI in the last 72 hours. Lipid Profile:   Lab Results   Component Value Date/Time    TRIG 148 02/08/2023 08:50 AM    HDL 43 02/08/2023 08:50 AM    LDLCALC 78 02/08/2023 08:50 AM    CHOL 151 02/08/2023 08:50 AM        XR CHEST (2 VW)    Result Date: 2/12/2023  EXAMINATION: TWO XRAY VIEWS OF THE CHEST 2/12/2023 8:18 am COMPARISON: February 9, 2023 HISTORY: ORDERING SYSTEM PROVIDED HISTORY: pleural effusion TECHNOLOGIST PROVIDED HISTORY: Reason for exam:->pleural effusion FINDINGS: There are low lung volumes. Persistent hazy opacities in right lung base. There are opacities at level of posterior costophrenic sulci on lateral view. The heart appears to be normal in size.   Interstitial prominence in perihilar locations. No pneumothorax. Improved aeration in right lung base. Persistent small pleural effusion or subsegmental atelectasis at level of posterior costophrenic sulci. CT ABDOMEN PELVIS W IV CONTRAST Additional Contrast? None    Result Date: 1/30/2023  EXAMINATION: CT OF THE ABDOMEN AND PELVIS WITH CONTRAST 1/30/2023 2:34 pm TECHNIQUE: CT of the abdomen and pelvis was performed with the administration of intravenous contrast. Multiplanar reformatted images are provided for review. Automated exposure control, iterative reconstruction, and/or weight based adjustment of the mA/kV was utilized to reduce the radiation dose to as low as reasonably achievable. COMPARISON: CT abdomen and pelvis, 12/03/2022 HISTORY: ORDERING SYSTEM PROVIDED HISTORY: rectal bleeding TECHNOLOGIST PROVIDED HISTORY: Reason for exam:->rectal bleeding Additional Contrast?->None Decision Support Exception - unselect if not a suspected or confirmed emergency medical condition->Emergency Medical Condition (MA) FINDINGS: Lower Chest: The heart is normal in size. Prominent calcified coronary atherosclerosis. Small right pleural effusion with overlying compressive atelectasis. Organs: Liver: Unremarkable. Gallbladder: Layering hyperdensity in the gallbladder likely represent gallstones. No pericholecystic edema or biliary ductal dilatation. Pancreas: Moderately atrophied. No mass, ductal dilatation or edema is seen. Spleen:  Unremarkable. Adrenals: Unremarkable. Kidneys: Unremarkable. GI/Bowel: A few scattered distal colonic diverticuli are noted. No evidence of acute diverticulitis. No bowel wall thickening or obstruction is seen. The appendix is unremarkable. Pelvis: The urinary bladder is unremarkable. Within limits of the CT technique, the uterus and the adnexa are grossly unremarkable. Peritoneum/Retroperitoneum: No evidence of abdominal aortic aneurysm.   Mild calcified atherosclerosis in the pelvic arteries. IVC filter in situ. No lymphadenopathy. No free air. No ascites. Bones/Soft Tissues: The visualized bones are intact without fracture or focal lesion. Miscellaneous: 9.7 x 5.3 x 8.1 cm fat containing umbilical hernia. No evidence of fat strangulation. 1.  No acute abnormality is seen in the abdomen or the pelvis. 2. Minimal distal colonic diverticulosis without diverticulitis. 3. Prominent fat containing umbilical hernia. No evidence of fat strangulation. 4. Cholelithiasis. 5. Small right pleural effusion. XR CHEST PORTABLE    Result Date: 2/9/2023  EXAMINATION: ONE XRAY VIEW OF THE CHEST 2/9/2023 7:07 am COMPARISON: 02/07/2023 HISTORY: ORDERING SYSTEM PROVIDED HISTORY: right pleural effusion TECHNOLOGIST PROVIDED HISTORY: Reason for exam:->right pleural effusion FINDINGS: Heart is mildly enlarged and there is borderline prominence of the pulmonary vascularity. There is a moderate size right pleural effusion unchanged. Remainder of the lungs and pleural spaces are normal.     Findings suggest some degree of CHF, unchanged     XR CHEST PORTABLE    Result Date: 2/7/2023  EXAMINATION: ONE XRAY VIEW OF THE CHEST 2/7/2023 11:13 am COMPARISON: None. HISTORY: ORDERING SYSTEM PROVIDED HISTORY: SOB hypoxia TECHNOLOGIST PROVIDED HISTORY: Reason for exam:->SOB hypoxia FINDINGS: The chest x-ray is limited due to the patient's body habitus The heart is enlarged. I cannot exclude bibasilar airspace disease and/or pleural effusions. The upper lobes are clear. There is no pneumothorax     1. Cardiomegaly. There are no findings of CHF 2. Possible bibasilar airspace disease and or pleural effusions. CTA PULMONARY W CONTRAST    Result Date: 2/7/2023  EXAMINATION: CTA OF THE CHEST 2/7/2023 2:27 pm TECHNIQUE: CTA of the chest was performed after the administration of intravenous contrast.  Multiplanar reformatted images are provided for review. MIP images are provided for review.  Automated exposure control, iterative reconstruction, and/or weight based adjustment of the mA/kV was utilized to reduce the radiation dose to as low as reasonably achievable. COMPARISON: December 3, 2022 HISTORY: ORDERING SYSTEM PROVIDED HISTORY: r/o PE TECHNOLOGIST PROVIDED HISTORY: Reason for exam:->r/o PE Decision Support Exception - unselect if not a suspected or confirmed emergency medical condition->Emergency Medical Condition (MA) FINDINGS: Limited examination due to motion and suboptimal timing of contrast.  Distal segmental and subsegmental pulmonary arteries are not optimally assessed. No large central or proximal segmental pulmonary arterial embolism. There are increased opacities in right lower lobe. There is a moderate right pleural effusion. There is prominence of the pulmonary vasculature. No pneumothorax. There is moderate cardiomegaly. No pericardial effusion. Atherosclerotic calcifications are associated with the coronary arteries. Few prominent lymph nodes are seen in right hilar location. There are multiple prominent mediastinal lymph nodes appearing similar in size and number notable in pretracheal location measuring up to 1.7 x 1.2 cm. View of the upper abdomen shows reflux of contrast from right atrium into the hepatic veins. 1. Limited examination due to motion and suboptimal timing of contrast.  No large central or proximal segmental pulmonary arterial embolism. Pulmonary embolism seen on prior is not definitively visualized. Repeat CTA may be helpful for further evaluation. 2. Increased opacities in right lower lobe suggesting atelectasis and pneumonia. 3. New right pleural effusion. 4. Pulmonary vascular congestion and findings which could indicate right heart failure.      US DUP LOWER EXTREMITIES BILATERAL VENOUS    Result Date: 2/7/2023  EXAMINATION: DUPLEX VENOUS ULTRASOUND OF THE BILATERAL LOWER EXTREMITIES2/7/2023 11:05 am TECHNIQUE: Duplex ultrasound using B-mode/gray scaled imaging, Doppler spectral analysis and color flow Doppler was obtained of the deep venous structures of the lower bilateral extremities. COMPARISON: None. HISTORY: ORDERING SYSTEM PROVIDED HISTORY: DVT r/o, hx of PE TECHNOLOGIST PROVIDED HISTORY: Reason for exam:->DVT r/o, hx of PE What reading provider will be dictating this exam?->CRC FINDINGS: The visualized veins of the right and left lower extremities are patent and free of echogenic thrombus. The veins demonstrate good compressibility with normal color flow study and spectral analysis. Note that evaluation of the calf veins in both legs is limited due to patient body habitus. No visible DVT in either lower extremity. US DUP LOWER EXTREMITIES BILATERAL VENOUS    Result Date: 1/19/2023  EXAMINATION: DUPLEX VENOUS ULTRASOUND OF THE BILATERAL LOWER EXTREMITIES1/19/2023 9:18 pm TECHNIQUE: Duplex ultrasound using B-mode/gray scaled imaging, Doppler spectral analysis and color flow Doppler was obtained of the deep venous structures of the lower bilateral extremities. COMPARISON: None. HISTORY: ORDERING SYSTEM PROVIDED HISTORY: Leg swelling, rule out DVT TECHNOLOGIST PROVIDED HISTORY: leg swelling, rule out DVT Reason for exam:->Leg swelling, rule out DVT What reading provider will be dictating this exam?->CRC FINDINGS: The visualized veins of the bilateral lower extremities are patent and free of echogenic thrombus. The veins demonstrate good compressibility with normal color flow study and spectral analysis. No evidence of DVT in either lower extremity.        Discharge Exam:  See progress note from today    Discharge Medication List as of 2/12/2023 12:49 PM        START taking these medications    Details   amLODIPine (NORVASC) 2.5 MG tablet Take 1 tablet by mouth daily, Disp-30 tablet, R-3Normal           CONTINUE these medications which have NOT CHANGED    Details   insulin glargine (LANTUS) 100 UNIT/ML injection vial Inject 20 Units into the skin nightly, Disp-10 mL, R-3Normal      !! insulin lispro (HUMALOG) 100 UNIT/ML SOLN injection vial Inject 0-8 Units into the skin 3 times daily (with meals), Disp-1 each, R-2Normal      !! insulin lispro (HUMALOG) 100 UNIT/ML SOLN injection vial Inject 0-4 Units into the skin nightly, Disp-1 each, R-3Normal      !! insulin lispro (HUMALOG) 100 UNIT/ML SOLN injection vial Inject 4 Units into the skin 3 times daily (with meals), Disp-1 each, R-3Normal      Insulin Pen Needle ([employer] PEN NEEDLES 29G) 29G X 12MM MISC DAILY Starting Wed 2/1/2023, Disp-100 each, R-3, Normal      !! Blood Glucose Monitoring Suppl (BLOOD GLUCOSE MONITOR SYSTEM) w/Device KIT Historical Med      glucose 4 g chewable tablet Take 4 tablets by mouth as needed for Low blood sugar, Disp-60 tablet, R-3Normal      pantoprazole (PROTONIX) 40 MG tablet Take 1 tablet by mouth 2 times daily (before meals), Disp-60 tablet, R-4Normal      !! glucose monitoring (FREESTYLE FREEDOM) kit DAILY Starting Sat 1/21/2023, Disp-1 kit, R-0, Normal      Lancets 30G MISC DAILY Starting Sat 1/21/2023, Disp-100 each, R-3, Normal      blood glucose monitor strips Test 3 times a day. Dispense sufficient amount for indicated testing frequency plus additional to accommodate PRN testing needs. , Disp-300 strip, R-0, Normal      rosuvastatin (CRESTOR) 5 MG tablet Take 1 tablet by mouth daily, Disp-30 tablet, R-3Normal      acetaminophen (TYLENOL) 650 MG extended release tablet Take 1,300 mg by mouth every 8 hours as needed for PainHistorical Med      midodrine (PROAMATINE) 10 MG tablet Take 10 mg by mouth daily as needed (AT DIALYSIS FOR LOW BP)Historical Med      calcium acetate (PHOSLO) 667 MG CAPS capsule Take 667 mg by mouth 3 times daily (with meals)Historical Med       !! - Potential duplicate medications found. Please discuss with provider.           Time Spent on discharge is more than 30 minutes --      TIME  INCLUDES TIME THAT WAS  SPENT WITH DISCHARGE PAPERS, MEDICATION REVIEW, MEDICATION RECONCILIATION,   PRESCRIPTIONS, CHART REVIEW, PATIENT EXAM , FINAL PROGRESS NOTE, DISCUSSION OF FINDINGS WITH PATIENT AND AVAILABLE FAMILY , AND DICTATION  WHERE NEEDED ; Home Health Care [facility]) FORMS COMPLETED ; N-17  COMPLETION ;  H&P UPDATED ; DURABLE EQUIPMENT FORMS.      Active Hospital Problems    Diagnosis     Aortic stenosis, mild [I35.0]      Priority: Medium    Anemia due to stage 5 chronic kidney disease (HCC) [N18.5, D63.1]      Priority: Medium    History of pulmonary embolus (PE) [Z86.711]      Priority: Medium    Acute on chronic heart failure with preserved ejection fraction (HCC) [I50.33]      Priority: Medium    S/P insertion of inferior vena caval filter [Z95.828]      Priority: Medium    End-stage renal disease on hemodialysis ([facility] [N18.6, Z99.2]      Priority: Medium    Lymphedema of both lower extremities [I89.0]      Priority: Medium    H/O heart artery stent [Z95.5]      Priority: Medium    Diabetes mellitus ([facility] [E11.9]      Priority: Medium    Obesity, Class III, BMI 40-49.9 (morbid obesity) ([facility] [E66.01]     Essential hypertension [I10]        Signed:    Angélica Thompson DO      2/14/2023    9:29 AM    Voice recognition software use for dictation

## 2023-02-22 ENCOUNTER — OFFICE VISIT (OUTPATIENT)
Dept: PRIMARY CARE CLINIC | Age: 68
End: 2023-02-22

## 2023-02-22 VITALS
RESPIRATION RATE: 18 BRPM | WEIGHT: 252 LBS | HEIGHT: 64 IN | HEART RATE: 78 BPM | DIASTOLIC BLOOD PRESSURE: 58 MMHG | TEMPERATURE: 96.9 F | SYSTOLIC BLOOD PRESSURE: 118 MMHG | BODY MASS INDEX: 43.02 KG/M2 | OXYGEN SATURATION: 90 %

## 2023-02-22 DIAGNOSIS — I26.99 PULMONARY EMBOLISM WITHOUT ACUTE COR PULMONALE, UNSPECIFIED CHRONICITY, UNSPECIFIED PULMONARY EMBOLISM TYPE (HCC): ICD-10-CM

## 2023-02-22 DIAGNOSIS — I89.0 LYMPHEDEMA: ICD-10-CM

## 2023-02-22 DIAGNOSIS — I50.9 CHRONIC CONGESTIVE HEART FAILURE, UNSPECIFIED HEART FAILURE TYPE (HCC): ICD-10-CM

## 2023-02-22 DIAGNOSIS — Z99.2 ESRD ON HEMODIALYSIS (HCC): ICD-10-CM

## 2023-02-22 DIAGNOSIS — Z09 HOSPITAL DISCHARGE FOLLOW-UP: Primary | ICD-10-CM

## 2023-02-22 DIAGNOSIS — K21.01 GASTROESOPHAGEAL REFLUX DISEASE WITH ESOPHAGITIS AND HEMORRHAGE: ICD-10-CM

## 2023-02-22 DIAGNOSIS — L30.9 DERMATITIS: ICD-10-CM

## 2023-02-22 DIAGNOSIS — E11.65 UNCONTROLLED TYPE 2 DIABETES MELLITUS WITH HYPERGLYCEMIA (HCC): ICD-10-CM

## 2023-02-22 DIAGNOSIS — N18.6 ESRD ON HEMODIALYSIS (HCC): ICD-10-CM

## 2023-02-22 DIAGNOSIS — N18.6 END STAGE KIDNEY DISEASE (HCC): ICD-10-CM

## 2023-02-22 DIAGNOSIS — E66.01 OBESITY, CLASS III, BMI 40-49.9 (MORBID OBESITY) (HCC): ICD-10-CM

## 2023-02-22 DIAGNOSIS — E11.8 DIABETES MELLITUS TYPE 2 WITH COMPLICATIONS (HCC): ICD-10-CM

## 2023-02-22 RX ORDER — INSULIN LISPRO 100 [IU]/ML
5 INJECTION, SOLUTION INTRAVENOUS; SUBCUTANEOUS
Qty: 4 EACH | Refills: 3 | Status: SHIPPED | OUTPATIENT
Start: 2023-02-22

## 2023-02-22 RX ORDER — BLOOD-GLUCOSE SENSOR
1 EACH MISCELLANEOUS
Qty: 2 EACH | Refills: 5 | Status: SHIPPED | OUTPATIENT
Start: 2023-02-22

## 2023-02-22 RX ORDER — BLOOD-GLUCOSE METER
1 KIT MISCELLANEOUS DAILY
Qty: 1 KIT | Refills: 0 | Status: SHIPPED | OUTPATIENT
Start: 2023-02-22

## 2023-02-22 RX ORDER — PEN NEEDLE, DIABETIC 29 GAUGE
1 NEEDLE, DISPOSABLE MISCELLANEOUS DAILY
Qty: 100 EACH | Refills: 3 | Status: SHIPPED | OUTPATIENT
Start: 2023-02-22

## 2023-02-22 RX ORDER — AMMONIUM LACTATE 12 G/100G
CREAM TOPICAL
Qty: 385 G | Refills: 4 | Status: SHIPPED | OUTPATIENT
Start: 2023-02-22 | End: 2023-03-24

## 2023-02-22 SDOH — ECONOMIC STABILITY: HOUSING INSECURITY
IN THE LAST 12 MONTHS, WAS THERE A TIME WHEN YOU DID NOT HAVE A STEADY PLACE TO SLEEP OR SLEPT IN A SHELTER (INCLUDING NOW)?: NO

## 2023-02-22 SDOH — ECONOMIC STABILITY: INCOME INSECURITY: HOW HARD IS IT FOR YOU TO PAY FOR THE VERY BASICS LIKE FOOD, HOUSING, MEDICAL CARE, AND HEATING?: NOT HARD AT ALL

## 2023-02-22 SDOH — ECONOMIC STABILITY: FOOD INSECURITY: WITHIN THE PAST 12 MONTHS, THE FOOD YOU BOUGHT JUST DIDN'T LAST AND YOU DIDN'T HAVE MONEY TO GET MORE.: NEVER TRUE

## 2023-02-22 SDOH — ECONOMIC STABILITY: FOOD INSECURITY: WITHIN THE PAST 12 MONTHS, YOU WORRIED THAT YOUR FOOD WOULD RUN OUT BEFORE YOU GOT MONEY TO BUY MORE.: NEVER TRUE

## 2023-02-22 NOTE — PROGRESS NOTES
Post-Discharge Transitional Care  Follow Up      Mago Patel   YOB: 1955    Date of Office Visit:  2/22/2023  Date of Hospital Admission: 2/7/23  Date of Hospital Discharge: 2/12/23  Risk of hospital readmission (high >=14%. Medium >=10%) :Readmission Risk Score: 24.7      Care management risk score Rising risk (score 2-5) and Complex Care (Scores >=6): No Risk Score On File     Non face to face  following discharge, date last encounter closed (first attempt may have been earlier): *No documented post hospital discharge outreach found in the last 14 days    Call initiated 2 business days of discharge: *No response recorded in the last 14 days    ASSESSMENT/PLAN:   Hospital discharge follow-up  -     [facility] DISCHARGE MEDS RECONCILED W/ CURRENT OUTPATIENT MED LIST  Chronic congestive heart failure, unspecified heart failure type ([facility]  Comments:  Continu fluid removal with dialysis,hospital disch.meds reviewed,avoid salt ,caffien ,soda,chocholate etc... Diabetes mellitus type 2 with complications (HCC)  Comments:  S.S. of insulin humalogue + 5 unit TID ,A1c unacceptable ,free Style Lucien ordered ,pt. has a smart phone,will bring readings,she is reluctant to FU with endo.   Orders:  -     Continuous Blood Gluc Sensor (FREESTYLE LUCIEN 3 SENSOR) MISC; 1 Device by Does not apply route every 14 days, Disp-2 each, R-5Normal  -     glucose monitoring (FREESTYLE FREEDOM) kit; DAILY Starting Wed 2/22/2023, Disp-1 kit, R-0, Normal  -     Insulin Pen Needle ([employer] PEN NEEDLES 29G) 29G X 12MM MISC; DAILY Starting Wed 2/22/2023, Disp-100 each, R-3, Normal  -     Lancets 30G MISC; DAILY Starting Wed 2/22/2023, Disp-100 each, R-3, Normal  Uncontrolled type 2 diabetes mellitus with hyperglycemia (HCC)  -     Continuous Blood Gluc Sensor (FREESTYLE LUCIEN 3 SENSOR) MISC; 1 Device by Does not apply route every 14 days, Disp-2 each, R-5Normal  -     glucose monitoring (FREESTYLE FREEDOM) kit; DAILY Starting Wed 2/22/2023, Disp-1 kit, R-0, Normal  -     Insulin Pen Needle ([employer] PEN NEEDLES 29G) 29G X 12MM MISC; DAILY Starting Wed 2/22/2023, Disp-100 each, R-3, Normal  -     Lancets 30G MISC; DAILY Starting Wed 2/22/2023, Disp-100 each, R-3, Normal  End stage kidney disease ([facility]  Comments:  Cont. Hemodialysis 3-4 X/wk  Obesity, Class III, BMI 40-49.9 (morbid obesity) ([facility]  Pulmonary embolism without acute cor pulmonale, unspecified chronicity, unspecified pulmonary embolism type (HCC)  Comments:  Cont. off Elequis due to GIB,she has IVC filter  ESRD on hemodialysis ([facility])  Gastroesophageal reflux disease with esophagitis and hemorrhage  Dermatitis  -     ammonium lactate (LAC-HYDRIN) 12 % cream; Apply topically as needed. , Disp-385 g, R-4, Normal  Lymphedema  Comments:  LLext. will referre to Therapy. Medical Decision Making: moderate complexity  Return in about 4 weeks (around 3/22/2023). Subjective:   HPI:  Follow up of Hospital problems/diagnosis(es): The pt. Is admitted with CHF ,needed daily dialysis 5 ds in a raw, she lost 18 lbs & she will also have an extra dialysis this friday    Inpatient course: Discharge summary reviewed- see chart.     Interval history/Current status: She had to be off Eliquis due to GI bleeding ,sever esophagitis ,she had afilter placed after her PE as she cannot take Eliquis    Patient Active Problem List   Diagnosis    Nausea & vomiting    Essential hypertension    Hyperlipidemia    Diabetes mellitus type 2 with complications (HCC)    Neck pain    Anemia in CKD (chronic kidney disease)    Pneumonia due to COVID-19 virus    Pulmonary hypertension, unspecified (HCC)    Obesity, Class III, BMI 40-49.9 (morbid obesity) ([facility]    History of Clostridioides difficile colitis    Anxiety and depression    At high risk for falls    Dizziness on standing    Acute pulmonary embolism (HCC)    H/O heart artery stent    Pulmonary embolism and infarction ([facility]    Diabetes mellitus ([facility] Hematemesis    End-stage renal disease on hemodialysis (HCC)    Leg swelling    Lymphedema of both lower extremities    Rectal bleeding    BRBPR (bright red blood per rectum)    Acute on chronic heart failure with preserved ejection fraction (HCC)    Aortic stenosis, mild    Anemia due to stage 5 chronic kidney disease ([facility]    History of pulmonary embolus (PE)    S/P insertion of inferior vena caval filter       Medications listed as ordered at the time of discharge from hospital     Medication List            Accurate as of February 22, 2023  7:54 PM. If you have any questions, ask your nurse or doctor. START taking these medications      ammonium lactate 12 % cream  Commonly known as: Lac-Hydrin  Apply topically as needed. Started by: Robert Guevara MD     FreeStyle Lucien 3 Sensor Misc  1 Device by Does not apply route every 14 days  Started by: Robert Guevara MD            CHANGE how you take these medications      * insulin lispro 100 UNIT/ML Soln injection vial  Commonly known as: HUMALOG  Inject 0-8 Units into the skin 3 times daily (with meals)  What changed: Another medication with the same name was changed. Make sure you understand how and when to take each. Changed by: Robert Guevara MD     * insulin lispro 100 UNIT/ML Soln injection vial  Commonly known as: HUMALOG  Inject 0-4 Units into the skin nightly  What changed: Another medication with the same name was changed. Make sure you understand how and when to take each. Changed by: Robert Guevara MD     * insulin lispro 100 UNIT/ML Soln injection vial  Commonly known as: HUMALOG  Inject 5 Units into the skin 3 times daily (with meals) In addition to sliding scale ( max 55 units /day)  What changed:   how much to take  additional instructions  Changed by: Robert Guevara MD           * This list has 3 medication(s) that are the same as other medications prescribed for you.  Read the directions carefully, and ask your doctor or other care provider to review them with you. CONTINUE taking these medications      acetaminophen 650 MG extended release tablet  Commonly known as: TYLENOL     amLODIPine 2.5 MG tablet  Commonly known as: NORVASC  Take 1 tablet by mouth daily     atorvastatin 10 MG tablet  Commonly known as: LIPITOR  Take 1 tablet by mouth daily     * Blood Glucose Monitor System w/Device Kit     * glucose monitoring kit  1 kit by Does not apply route daily     blood glucose test strips  Test 3 times a day. Dispense sufficient amount for indicated testing frequency plus additional to accommodate PRN testing needs. calcium acetate 667 MG Caps capsule  Commonly known as: PHOSLO     insulin glargine 100 UNIT/ML injection vial  Commonly known as: LANTUS  Inject 20 Units into the skin nightly     [employer] Pen Hulbert 29G 29G X 12MM Misc  Generic drug: Insulin Pen Needle  1 each by Does not apply route daily     Lancets 30G Misc  1 each by Does not apply route daily     midodrine 10 MG tablet  Commonly known as: PROAMATINE     pantoprazole 40 MG tablet  Commonly known as: PROTONIX  Take 1 tablet by mouth 2 times daily (before meals)           * This list has 2 medication(s) that are the same as other medications prescribed for you. Read the directions carefully, and ask your doctor or other care provider to review them with you.                 STOP taking these medications      glucose 4 g chewable tablet  Stopped by: Martin Avendano MD               Where to Get Your Medications        These medications were sent to [address] [facility] [address], [address] [place] [id number]      Phone: [phone]   ammonium lactate 12 % cream  FreeStyle Lucien 3 Sensor Misc  glucose monitoring kit  insulin lispro 100 UNIT/ML Soln injection vial  [employer] Pen Hulbert 29G 29G X 12MM Misc  Lancets 30G Misc           Medications marked \"taking\" at this time  Outpatient Medications Marked as Taking for the 2/22/23 encounter (Office Visit) with Barbara Muñoz MD   Medication Sig Dispense Refill    Continuous Blood Gluc Sensor (FREESTYLE RAMIN 3 SENSOR) MISC 1 Device by Does not apply route every 14 days 2 each 5    ammonium lactate (LAC-HYDRIN) 12 % cream Apply topically as needed. 385 g 4    glucose monitoring (FREESTYLE FREEDOM) kit 1 kit by Does not apply route daily 1 kit 0    Insulin Pen Needle ([employer] PEN NEEDLES 29G) 29G X 12MM MISC 1 each by Does not apply route daily 100 each 3    Lancets 30G MISC 1 each by Does not apply route daily 100 each 3    insulin lispro (HUMALOG) 100 UNIT/ML SOLN injection vial Inject 5 Units into the skin 3 times daily (with meals) In addition to sliding scale ( max 55 units /day) 4 each 3    amLODIPine (NORVASC) 2.5 MG tablet Take 1 tablet by mouth daily 30 tablet 3    atorvastatin (LIPITOR) 10 MG tablet Take 1 tablet by mouth daily 90 tablet 1    insulin glargine (LANTUS) 100 UNIT/ML injection vial Inject 20 Units into the skin nightly 10 mL 3    insulin lispro (HUMALOG) 100 UNIT/ML SOLN injection vial Inject 0-8 Units into the skin 3 times daily (with meals) 1 each 2    insulin lispro (HUMALOG) 100 UNIT/ML SOLN injection vial Inject 0-4 Units into the skin nightly 1 each 3    Blood Glucose Monitoring Suppl (BLOOD GLUCOSE MONITOR SYSTEM) w/Device KIT by Does not apply route      pantoprazole (PROTONIX) 40 MG tablet Take 1 tablet by mouth 2 times daily (before meals) 60 tablet 4    blood glucose monitor strips Test 3 times a day. Dispense sufficient amount for indicated testing frequency plus additional to accommodate PRN testing needs.  300 strip 0    acetaminophen (TYLENOL) 650 MG extended release tablet Take 1,300 mg by mouth every 8 hours as needed for Pain      midodrine (PROAMATINE) 10 MG tablet Take 10 mg by mouth daily as needed (AT DIALYSIS FOR LOW BP)      calcium acetate (PHOSLO) 667 MG CAPS capsule Take 667 mg by mouth 3 times daily (with meals)          Medications patient taking as of now reconciled against medications ordered at time of hospital discharge: Yes    A comprehensive review of systems was negative except for what was noted in the HPI. Objective:    BP (!) 118/58   Pulse 78   Temp 96.9 °F (36.1 °C)   Resp 18   Ht 5' 4\" (1.626 m)   Wt 252 lb (114.3 kg)   SpO2 90%   BMI 43.26 kg/m²   General Appearance: alert and oriented to person, place and time, well developed and well- nourished, obese,in no acute distress  Skin: warm and dry, no rash or erythema  Head: normocephalic and atraumatic  Eyes: pupils equal, round, and reactive to light, extraocular eye movements intact, conjunctivae normal  ENT: tympanic membrane, external ear and ear canal normal bilaterally, nose without deformity, nasal mucosa and turbinates normal without polyps  Neck: supple and non-tender without mass, no thyromegaly or thyroid nodules, no cervical lymphadenopathy  Pulmonary/Chest: clear to auscultation bilaterally- no wheezes, rales or rhonchi, normal air movement, no respiratory distress  Cardiovascular: normal rate, regular rhythm, normal S1 and S2, no murmurs, rubs, clicks, or gallops, distal pulses intact, no carotid bruits  Abdomen: soft, non-tender, non-distended, normal bowel sounds, no masses or organomegaly  Extremities: + 3-4 edema left L Ext. & 2-3 in Rt L Ext ,partially pitting  Musculoskeletal: normal range of motion, no joint swelling, deformity or tenderness  Neurologic: no cranial nerve deficit, gait walking with a walker. An electronic signature was used to authenticate this note.   --Hua Fenton MD

## 2023-02-23 RX ORDER — INSULIN LISPRO 100 [IU]/ML
INJECTION, SOLUTION INTRAVENOUS; SUBCUTANEOUS
COMMUNITY
Start: 2023-02-22 | End: 2023-03-04 | Stop reason: SDUPTHER

## 2023-02-23 RX ORDER — PEN NEEDLE, DIABETIC 29 G X1/2"
NEEDLE, DISPOSABLE MISCELLANEOUS
COMMUNITY
Start: 2023-02-22

## 2023-02-25 RX ORDER — INSULIN LISPRO 100 [IU]/ML
INJECTION, SOLUTION INTRAVENOUS; SUBCUTANEOUS
Qty: 4 ADJUSTABLE DOSE PRE-FILLED PEN SYRINGE | Refills: 3 | OUTPATIENT
Start: 2023-02-25

## 2023-02-27 ENCOUNTER — TELEPHONE (OUTPATIENT)
Dept: PRIMARY CARE CLINIC | Age: 68
End: 2023-02-27

## 2023-02-28 ENCOUNTER — TELEPHONE (OUTPATIENT)
Dept: PRIMARY CARE CLINIC | Age: 68
End: 2023-02-28

## 2023-02-28 NOTE — TELEPHONE ENCOUNTER
Note    Pharmacy called they need to know if insulin is to be pens or vials         I pended the pens and they were refused. Please advise.

## 2023-02-28 NOTE — TELEPHONE ENCOUNTER
Pt uses pens       Pt also stated that pharmacy needs a call regarding test strips before they refill

## 2023-02-28 NOTE — TELEPHONE ENCOUNTER
Pt needs refill test strips     Frequency test 3 times a day       [id number] - [place], [place] - [address]

## 2023-03-04 RX ORDER — INSULIN LISPRO 100 [IU]/ML
5 INJECTION, SOLUTION INTRAVENOUS; SUBCUTANEOUS
Qty: 4 ADJUSTABLE DOSE PRE-FILLED PEN SYRINGE | Refills: 3 | Status: SHIPPED | OUTPATIENT
Start: 2023-03-04

## 2023-03-11 ENCOUNTER — APPOINTMENT (OUTPATIENT)
Dept: CT IMAGING | Age: 68
End: 2023-03-11
Payer: MEDICARE

## 2023-03-11 ENCOUNTER — HOSPITAL ENCOUNTER (EMERGENCY)
Age: 68
Discharge: HOME | End: 2023-03-11
Attending: EMERGENCY MEDICINE
Payer: MEDICARE

## 2023-03-11 ENCOUNTER — APPOINTMENT (OUTPATIENT)
Dept: GENERAL RADIOLOGY | Age: 68
End: 2023-03-11
Payer: MEDICARE

## 2023-03-11 VITALS
DIASTOLIC BLOOD PRESSURE: 96 MMHG | RESPIRATION RATE: 16 BRPM | SYSTOLIC BLOOD PRESSURE: 143 MMHG | OXYGEN SATURATION: 100 % | BODY MASS INDEX: 42.68 KG/M2 | HEART RATE: 77 BPM | WEIGHT: 250 LBS | TEMPERATURE: 98.6 F | HEIGHT: 64 IN

## 2023-03-11 DIAGNOSIS — R07.9 CHEST PAIN, UNSPECIFIED TYPE: Primary | ICD-10-CM

## 2023-03-11 DIAGNOSIS — N18.6 ESRD ON DIALYSIS (HCC): ICD-10-CM

## 2023-03-11 DIAGNOSIS — Z99.2 ESRD ON DIALYSIS (HCC): ICD-10-CM

## 2023-03-11 DIAGNOSIS — R10.13 EPIGASTRIC PAIN: ICD-10-CM

## 2023-03-11 DIAGNOSIS — K43.9 FATTY HERNIA OF LINEA ALBA: ICD-10-CM

## 2023-03-11 PROBLEM — I27.20 MILD PULMONARY HYPERTENSION (HCC): Status: ACTIVE | Noted: 2021-01-01

## 2023-03-11 LAB
ALBUMIN SERPL-MCNC: 3.7 G/DL (ref 3.5–5.2)
ALP BLD-CCNC: 116 U/L (ref 35–104)
ALT SERPL-CCNC: 10 U/L (ref 0–32)
ANION GAP SERPL CALCULATED.3IONS-SCNC: 15 MMOL/L (ref 7–16)
AST SERPL-CCNC: 14 U/L (ref 0–31)
BASOPHILS ABSOLUTE: 0.03 E9/L (ref 0–0.2)
BASOPHILS RELATIVE PERCENT: 0.3 % (ref 0–2)
BILIRUB SERPL-MCNC: 0.3 MG/DL (ref 0–1.2)
BILIRUBIN DIRECT: <0.2 MG/DL (ref 0–0.3)
BILIRUBIN, INDIRECT: ABNORMAL MG/DL (ref 0–1)
BUN BLDV-MCNC: 53 MG/DL (ref 6–23)
CALCIUM SERPL-MCNC: 9.3 MG/DL (ref 8.6–10.2)
CHLORIDE BLD-SCNC: 93 MMOL/L (ref 98–107)
CO2: 28 MMOL/L (ref 22–29)
CREAT SERPL-MCNC: 5.8 MG/DL (ref 0.5–1)
EKG ATRIAL RATE: 77 BPM
EKG P AXIS: 54 DEGREES
EKG P-R INTERVAL: 164 MS
EKG Q-T INTERVAL: 442 MS
EKG QRS DURATION: 98 MS
EKG QTC CALCULATION (BAZETT): 500 MS
EKG R AXIS: -12 DEGREES
EKG T AXIS: 55 DEGREES
EKG VENTRICULAR RATE: 77 BPM
EOSINOPHILS ABSOLUTE: 0.32 E9/L (ref 0.05–0.5)
EOSINOPHILS RELATIVE PERCENT: 3.6 % (ref 0–6)
GFR SERPL CREATININE-BSD FRML MDRD: 7 ML/MIN/1.73
GLUCOSE BLD-MCNC: 399 MG/DL (ref 74–99)
HCT VFR BLD CALC: 34.7 % (ref 34–48)
HEMOGLOBIN: 11.2 G/DL (ref 11.5–15.5)
IMMATURE GRANULOCYTES #: 0.04 E9/L
IMMATURE GRANULOCYTES %: 0.5 % (ref 0–5)
LACTIC ACID: 1.2 MMOL/L (ref 0.5–2.2)
LIPASE: 14 U/L (ref 13–60)
LYMPHOCYTES ABSOLUTE: 1.2 E9/L (ref 1.5–4)
LYMPHOCYTES RELATIVE PERCENT: 13.5 % (ref 20–42)
MCH RBC QN AUTO: 30.8 PG (ref 26–35)
MCHC RBC AUTO-ENTMCNC: 32.3 % (ref 32–34.5)
MCV RBC AUTO: 95.3 FL (ref 80–99.9)
MONOCYTES ABSOLUTE: 0.56 E9/L (ref 0.1–0.95)
MONOCYTES RELATIVE PERCENT: 6.3 % (ref 2–12)
NEUTROPHILS ABSOLUTE: 6.71 E9/L (ref 1.8–7.3)
NEUTROPHILS RELATIVE PERCENT: 75.8 % (ref 43–80)
PDW BLD-RTO: 14.3 FL (ref 11.5–15)
PLATELET # BLD: 154 E9/L (ref 130–450)
PMV BLD AUTO: 11 FL (ref 7–12)
POTASSIUM SERPL-SCNC: 3.9 MMOL/L (ref 3.5–5)
PRO-BNP: ABNORMAL PG/ML (ref 0–125)
RBC # BLD: 3.64 E12/L (ref 3.5–5.5)
SODIUM BLD-SCNC: 136 MMOL/L (ref 132–146)
TOTAL PROTEIN: 6.9 G/DL (ref 6.4–8.3)
TROPONIN, HIGH SENSITIVITY: 59 NG/L (ref 0–9)
TROPONIN, HIGH SENSITIVITY: 60 NG/L (ref 0–9)
WBC # BLD: 8.9 E9/L (ref 4.5–11.5)

## 2023-03-11 PROCEDURE — 83605 ASSAY OF LACTIC ACID: CPT

## 2023-03-11 PROCEDURE — 83690 ASSAY OF LIPASE: CPT

## 2023-03-11 PROCEDURE — 80048 BASIC METABOLIC PNL TOTAL CA: CPT

## 2023-03-11 PROCEDURE — 36415 COLL VENOUS BLD VENIPUNCTURE: CPT

## 2023-03-11 PROCEDURE — 93005 ELECTROCARDIOGRAM TRACING: CPT | Performed by: EMERGENCY MEDICINE

## 2023-03-11 PROCEDURE — 85025 COMPLETE CBC W/AUTO DIFF WBC: CPT

## 2023-03-11 PROCEDURE — 84484 ASSAY OF TROPONIN QUANT: CPT

## 2023-03-11 PROCEDURE — 71045 X-RAY EXAM CHEST 1 VIEW: CPT

## 2023-03-11 PROCEDURE — 99285 EMERGENCY DEPT VISIT HI MDM: CPT

## 2023-03-11 PROCEDURE — 74176 CT ABD & PELVIS W/O CONTRAST: CPT

## 2023-03-11 PROCEDURE — 6370000000 HC RX 637 (ALT 250 FOR IP): Performed by: STUDENT IN AN ORGANIZED HEALTH CARE EDUCATION/TRAINING PROGRAM

## 2023-03-11 PROCEDURE — 83880 ASSAY OF NATRIURETIC PEPTIDE: CPT

## 2023-03-11 PROCEDURE — 80076 HEPATIC FUNCTION PANEL: CPT

## 2023-03-11 RX ORDER — SUCRALFATE 1 G/1
1 TABLET ORAL 4 TIMES DAILY
Qty: 120 TABLET | Refills: 0 | Status: SHIPPED | OUTPATIENT
Start: 2023-03-11

## 2023-03-11 RX ADMIN — ALUMINUM HYDROXIDE, MAGNESIUM HYDROXIDE, AND SIMETHICONE: 200; 200; 20 SUSPENSION ORAL at 07:01

## 2023-03-11 ASSESSMENT — PAIN - FUNCTIONAL ASSESSMENT
PAIN_FUNCTIONAL_ASSESSMENT: 0-10
PAIN_FUNCTIONAL_ASSESSMENT: 0-10

## 2023-03-11 ASSESSMENT — PAIN SCALES - GENERAL: PAINLEVEL_OUTOF10: 8

## 2023-03-11 ASSESSMENT — PAIN DESCRIPTION - LOCATION: LOCATION: CHEST

## 2023-03-11 NOTE — ED PROVIDER NOTES
[facility] ENCOUNTER        Pt Name: Viviana Palm  MRN: [id number]  [place] 1955  Date of evaluation: 3/11/2023  Provider: Lele Morejon MD  PCP: Martita Vizcaino MD  Note Started: 6:31 AM EST 3/11/23    CHIEF COMPLAINT       Chief Complaint   Patient presents with    Chest Pain       HISTORY OF PRESENT ILLNESS: 1 or more Elements        Limitations to history : None    Viviana Palm is a 78 y.o. female who presents to the emerged from for left-sided chest pain, ongoing since this morning, denies any shortness of breath, denies any fevers or chills, denies any nausea vomiting or diarrhea. Does have a history of prior MI, that was on her right side, this feels different. She is also complaining of some left upper quadrant pain. Nursing Notes were all reviewed and agreed with or any disagreements were addressed in the HPI. REVIEW OF EXTERNAL NOTE :       As below    REVIEW OF SYSTEMS :      Positives and Pertinent negatives as per HPI.      SURGICAL HISTORY     Past Surgical History:   Procedure Laterality Date    CARDIAC CATHETERIZATION  2015    Done in [address] [facility] [address]      COLONOSCOPY  2017    Negative findings    DIALYSIS FISTULA CREATION Left 01/28/2022    REVISION AV FISTULA LEFT ARM performed by Gregorio Hanna MD at [address] [place] [address]    IVC FILTER INSERTION  01/20/2023    DVT and pulmonary emboli, bleeding duodenal ulcer    PTCA  2015    PTCA [facility]    UPPER GASTROINTESTINAL ENDOSCOPY N/A 12/01/2021    EGD BIOPSY performed by Tyler Sterling MD at [place] [address] N/A 01/18/2023    EGD BIOPSY performed by Tyler Sterling MD at [place] [address] N/A 2/10/2023    EGD ESOPHAGOGASTRODUODENOSCOPY performed by Tyler Sterling MD at [address] [place] [address] N/A 11/24/2021    CATHETER INSERTION TUNNELED HEMODIALYSIS, WITH REMOVAL OF TEMPORARY CATHETER performed by Sandra Fenton MD at [address] [facility]       Discharge Medication List as of 3/11/2023  9:54 AM        CONTINUE these medications which have NOT CHANGED    Details   HUMALOG KWIKPEN 100 UNIT/ML SOPN Inject 5 Units into the skin 3 times daily (before meals), Disp-4 Adjustable Dose Pre-filled Pen Syringe, R-3, DAWNormal      !! blood glucose test strips (ASCENSIA AUTODISC VI;ONE TOUCH ULTRA TEST VI) strip 4 TIMES DAILY AFTER MEALS AND BEFORE BEDTIME Starting Sat 3/4/2023, Disp-400 each, R-5, Normal      !! BD ULTRA-FINE PEN NEEDLES 29G X 12.7MM MISC Starting Wed 2/22/2023, DANETTE, Historical Med      Continuous Blood Gluc Sensor (FREESTYLE RAMIN 3 SENSOR) MISC 1 Device by Does not apply route every 14 days, Disp-2 each, R-5Normal      ammonium lactate (LAC-HYDRIN) 12 % cream Apply topically as needed. , Disp-385 g, R-4, Normal      !! glucose monitoring (FREESTYLE FREEDOM) kit DAILY Starting Wed 2/22/2023, Disp-1 kit, R-0, Normal      !! Insulin Pen Needle ([employer] PEN NEEDLES 29G) 29G X 12MM MISC DAILY Starting Wed 2/22/2023, Disp-100 each, R-3, Normal      Lancets 30G MISC DAILY Starting Wed 2/22/2023, Disp-100 each, R-3, Normal      insulin lispro (HUMALOG) 100 UNIT/ML SOLN injection vial Inject 5 Units into the skin 3 times daily (with meals) In addition to sliding scale ( max 55 units /day), Disp-4 each, R-3Normal      amLODIPine (NORVASC) 2.5 MG tablet Take 1 tablet by mouth daily, Disp-30 tablet, R-3Normal      atorvastatin (LIPITOR) 10 MG tablet Take 1 tablet by mouth daily, Disp-90 tablet, R-1Normal      insulin glargine (LANTUS) 100 UNIT/ML injection vial Inject 20 Units into the skin nightly, Disp-10 mL, R-3Normal      !!  Blood Glucose Monitoring Suppl (BLOOD GLUCOSE MONITOR SYSTEM) w/Device KIT Historical Med      pantoprazole (PROTONIX) 40 MG tablet Take 1 tablet by mouth 2 times daily (before meals), Disp-60 tablet, R-4Normal      !! blood glucose monitor strips Test 3 times a day. Dispense sufficient amount for indicated testing frequency plus additional to accommodate PRN testing needs. , Disp-300 strip, R-0, Normal      acetaminophen (TYLENOL) 650 MG extended release tablet Take 1,300 mg by mouth every 8 hours as needed for PainHistorical Med      midodrine (PROAMATINE) 10 MG tablet Take 10 mg by mouth daily as needed (AT DIALYSIS FOR LOW BP)Historical Med      calcium acetate (PHOSLO) 667 MG CAPS capsule Take 667 mg by mouth 3 times daily (with meals)Historical Med       !! - Potential duplicate medications found. Please discuss with provider. ALLERGIES     Pcn [penicillins], Benzonatate, Morphine, and Sodium hypochlorite    FAMILYHISTORY       Family History   Problem Relation Age of Onset    Coronary Art Dis Mother          age 62 acute MI    Coronary Art Dis Father          age 62 CVA    Coronary Art Dis Brother         SOCIAL HISTORY       Social History     Tobacco Use    Smoking status: Former     Packs/day: 1.00     Years: 12.00     Pack years: 12.00     Types: Cigarettes     Start date:      Quit date:      Years since quittin.2    Smokeless tobacco: Never   Vaping Use    Vaping Use: Never used   Substance Use Topics    Alcohol use: Never    Drug use: Never       SCREENINGS        [place] Coma Scale  Eye Opening: Spontaneous  Best Verbal Response: Oriented  Best Motor Response: Obeys commands  [place] Coma Scale Score: 15                CIWA Assessment  BP: (!) 143/96  Heart Rate: 77           PHYSICAL EXAM  1 or more Elements     ED Triage Vitals [23 0545]   BP Temp Temp Source Heart Rate Resp SpO2 Height Weight   134/78 98.1 °F (36.7 °C) Oral 78 16 98 % 5' 4\" (1.626 m) 250 lb (113.4 kg)         Physical Exam  Vitals and nursing note reviewed. Constitutional:       Appearance: Normal appearance. HENT:      Head: Normocephalic and atraumatic.       Right Ear: External ear normal.      Left Ear: External ear normal.      Nose: Nose normal.      Mouth/Throat:      Mouth: Mucous membranes are moist.   Eyes:      Extraocular Movements: Extraocular movements intact. Pupils: Pupils are equal, round, and reactive to light. Cardiovascular:      Rate and Rhythm: Normal rate and regular rhythm. Pulses: Normal pulses. Heart sounds: Normal heart sounds. Pulmonary:      Effort: Pulmonary effort is normal.      Breath sounds: Normal breath sounds. Abdominal:      General: Abdomen is flat. Bowel sounds are normal.      Palpations: Abdomen is soft. Tenderness: There is abdominal tenderness (Left upper quadrant region). Musculoskeletal:         General: Normal range of motion. Cervical back: Normal range of motion and neck supple. Skin:     General: Skin is warm and dry. Neurological:      Mental Status: She is alert.                 DIAGNOSTIC RESULTS   LABS:    Labs Reviewed   CBC WITH AUTO DIFFERENTIAL - Abnormal; Notable for the following components:       Result Value    Hemoglobin 11.2 (*)     Lymphocytes % 13.5 (*)     Lymphocytes Absolute 1.20 (*)     All other components within normal limits   BASIC METABOLIC PANEL - Abnormal; Notable for the following components:    Chloride 93 (*)     Glucose 399 (*)     BUN 53 (*)     Creatinine 5.8 (*)     All other components within normal limits   HEPATIC FUNCTION PANEL - Abnormal; Notable for the following components:    Alkaline Phosphatase 116 (*)     All other components within normal limits   TROPONIN - Abnormal; Notable for the following components:    Troponin, High Sensitivity 60 (*)     All other components within normal limits   BRAIN NATRIURETIC PEPTIDE - Abnormal; Notable for the following components:    Pro-BNP 12,424 (*)     All other components within normal limits   TROPONIN - Abnormal; Notable for the following components:    Troponin, High Sensitivity 59 (*)     All other components within normal limits   LACTIC ACID   LIPASE       As interpreted by me, the above displayed labs are abnormal. All other labs obtained during this visit were within normal range or not returned as of this dictation. RADIOLOGY:   Non-plain film images such as CT, Ultrasound and MRI are read by the radiologist. Plain radiographic images are visualized and preliminarily interpreted by the ED Provider with the findings documented in the ED Course. Interpretation per the Radiologist below, if available at the time of this note:    CT ABDOMEN PELVIS WO CONTRAST Additional Contrast? None   Final Result   No acute findings of the abdomen or pelvis specifically no mechanical   obstructive process of bowel or obstructing uropathy with atrophic kidneys   bilateral      Fat containing ventral abdominal wall hernia without associated bowel or   fluid collection hernia neck measures 3.2 cm      Trace to small right pleural effusion with adjacent atelectasis         XR CHEST PORTABLE   Final Result   1. Cardiomegaly. There are no findings of failure or pneumonia. No results found. No results found.     PROCEDURES   Unless otherwise noted below, none       CRITICAL CARE TIME (.cct)   none    PAST MEDICAL HISTORY/Chronic Conditions Affecting Care      has a past medical history of Acute on chronic heart failure with preserved ejection fraction ([address] (02/07/2023), Acute pulmonary embolism ([address] (12/03/2022), Anemia due to stage 5 chronic kidney disease, not on chronic dialysis ([address] (02/08/2023), Anemia in CKD (chronic kidney disease) (11/30/2021), Anxiety and depression (01/19/2022), Aortic stenosis, mild (02/08/2023), At high risk for falls (01/19/2022), Brain aneurysm, CLABSI (central line-associated bloodstream infection) (11/19/2021), Cough (01/19/2022), Diabetes mellitus ([address] (2006), Diabetes mellitus type 2 with complications ([address] (37/46/3785), Dizziness on standing (01/19/2022), End-stage renal disease on hemodialysis ([facility] (01/19/2023), ESRD (end stage renal disease) ([facility] (11/19/2021), ESRD on hemodialysis ([facility] (11/19/2021), Essential hypertension (11/30/2021), Fever, unspecified, Gastrointestinal hemorrhage (11/30/2021), H/O heart artery stent (2015), History of Clostridioides difficile colitis (01/19/2022), History of pulmonary embolus (PE) (02/08/2023), Hyperlipidemia, Hypertension, Leg swelling (01/19/2023), Lymphedema of both lower extremities (01/19/2023), Mild pulmonary hypertension ([facility] (2021), MSSA bacteremia (11/18/2021), Nausea & vomiting (11/18/2021), Obesity, Class III, BMI 40-49.9 (morbid obesity) ([facility] (43/64/3227), Periumbilical abdominal pain, and S/P insertion of inferior vena caval filter (01/20/2023). EMERGENCY DEPARTMENT COURSE    Vitals:    Vitals:    03/11/23 0545 03/11/23 0548   BP: 134/78 (!) 143/96   Pulse: 78 77   Resp: 16 16   Temp: 98.1 °F (36.7 °C) 98.6 °F (37 °C)   TempSrc: Oral Oral   SpO2: 98% 100%   Weight: 250 lb (113.4 kg)    Height: 5' 4\" (1.626 m)        Patient was given the following medications:  Medications   aluminum & magnesium hydroxide-simethicone (MAALOX) 30 mL, lidocaine viscous hcl (XYLOCAINE) 5 mL (GI COCKTAIL) ( Oral Given 3/11/23 0701)         Is this patient to be included in the SEP-1 Core Measure due to severe sepsis or septic shock? No Exclusion criteria - the patient is NOT to be included for SEP-1 Core Measure due to:  Infection is not suspected          Medical Decision Making/Differential Diagnosis:    CC/HPI Summary, Social Determinants of health, Records Reviewed, DDx, testing done/not done, ED Course, Reassessment, disposition considerations/shared decision making with patient, consults, disposition:        ED Course as of 03/11/23 1228   Sat Mar 11, 2023   0604 EKG  0557  Interpreted by myself  Heart rate 70  Normal sinus rhythm  PVCs, LVH, prolonged QT, normal axis, no acute ischemic finding  Similar to previous EKG [HILDA]   0629 Admission to 2/7/23 patient has an IVC filter. History of PE [JG]   0734 On my interpretation of patient's chest x-ray no visible pneumothorax. [JG]   [id number] On my interpretation patient CT abdomen pelvis no visualized free air. [JG]   [id number] Fat-containing ventral hernia seen on patient's CT but was otherwise unremarkable. Will speak to patient about potential discharge [JG]   [phone] Poke to patient not reassuring work-up she did get some relief after the GI cocktail, will speak to her nephrologist that she missed her dialysis session today. [JG]      ED Course User Index  [HILDA] Анна , DO  [JG] Dinah Gonzalez MD       Medical Decision Making  60-year-old female ESRD on dialysis presenting to the emerged part for chest pain. Was set to have dialysis today but came in as she was having chest discomfort. Considered possible MI, EKG was unchanged compared to prior, troponins were not significant elevated from her baseline with no significant delta. Considered possible electrolyte abnormality given the missed dialysis, was not present on laboratory evaluation, considered fluid overload as well, however patient did not appear to have fluid overload on her chest x-ray, proBNP within her baseline. Considered intra-abdominal infection, no significant findings on CT imaging, she was given a GI cocktail emergency department with improvement of her discomfort, along with her chest discomfort. Felt she may have underlying GI process, she has a history of prior ulcers. She was given a prescription for Carafate, instructed follow-up with her primary care physician, return precautions given. She was arranged to get dialysis after speaking her nephrologist at her dialysis center. Patient discharged.     Problems Addressed:  Chest pain, unspecified type: acute illness or injury  Epigastric pain: acute illness or injury  ESRD on dialysis [facility]): chronic illness or injury  Fatty hernia of linea alba: undiagnosed new problem with uncertain prognosis    Amount and/or Complexity of Data Reviewed  External Data Reviewed: notes. Labs: ordered. Decision-making details documented in ED Course. Radiology: ordered and independent interpretation performed. Decision-making details documented in ED Course. Risk  Prescription drug management. CONSULTS: (Who and What was discussed)  IP CONSULT TO NEPHROLOGY        I am the Primary Clinician of Record. FINAL IMPRESSION      1. Chest pain, unspecified type    2. ESRD on dialysis ([address]    3. Epigastric pain    4.  Fatty hernia of linea alba          DISPOSITION/PLAN     DISPOSITION Decision To Discharge 03/11/2023 09:54:00 AM      PATIENT REFERRED TO:  Odessa Queen MD  [address] [place] [phone]    Call in 1 day  For follow up appointment    DISCHARGE MEDICATIONS:  Discharge Medication List as of 3/11/2023  9:54 AM        START taking these medications    Details   sucralfate (CARAFATE) 1 GM tablet Take 1 tablet by mouth 4 times daily, Disp-120 tablet, R-0Normal             DISCONTINUED MEDICATIONS:  Discharge Medication List as of 3/11/2023  9:54 AM                 (Please note that portions of this note were completed with a voice recognition program.  Efforts were made to edit the dictations but occasionally words are mis-transcribed.)    Zach Bull MD (electronically signed)           Luis Andrews MD  Resident  03/11/23 7368

## 2023-03-14 LAB
EKG ATRIAL RATE: 77 BPM
EKG P AXIS: 54 DEGREES
EKG P-R INTERVAL: 164 MS
EKG Q-T INTERVAL: 442 MS
EKG QRS DURATION: 98 MS
EKG QTC CALCULATION (BAZETT): 500 MS
EKG R AXIS: -12 DEGREES
EKG T AXIS: 55 DEGREES
EKG VENTRICULAR RATE: 77 BPM

## 2023-03-14 PROCEDURE — 93010 ELECTROCARDIOGRAM REPORT: CPT | Performed by: INTERNAL MEDICINE

## 2023-04-03 ENCOUNTER — OFFICE VISIT (OUTPATIENT)
Dept: VASCULAR SURGERY | Age: 68
End: 2023-04-03
Payer: MEDICARE

## 2023-04-03 DIAGNOSIS — N18.6 END-STAGE RENAL DISEASE ON HEMODIALYSIS (HCC): ICD-10-CM

## 2023-04-03 DIAGNOSIS — I26.99 ACUTE PULMONARY EMBOLISM, UNSPECIFIED PULMONARY EMBOLISM TYPE, UNSPECIFIED WHETHER ACUTE COR PULMONALE PRESENT (HCC): Primary | ICD-10-CM

## 2023-04-03 DIAGNOSIS — Z99.2 END-STAGE RENAL DISEASE ON HEMODIALYSIS (HCC): ICD-10-CM

## 2023-04-03 PROCEDURE — 99213 OFFICE O/P EST LOW 20 MIN: CPT

## 2023-04-03 PROCEDURE — G8417 CALC BMI ABV UP PARAM F/U: HCPCS

## 2023-04-03 PROCEDURE — 1036F TOBACCO NON-USER: CPT

## 2023-04-03 PROCEDURE — 1123F ACP DISCUSS/DSCN MKR DOCD: CPT

## 2023-04-03 PROCEDURE — 1090F PRES/ABSN URINE INCON ASSESS: CPT

## 2023-04-03 PROCEDURE — G8427 DOCREV CUR MEDS BY ELIG CLIN: HCPCS

## 2023-04-03 PROCEDURE — G8400 PT W/DXA NO RESULTS DOC: HCPCS

## 2023-04-03 PROCEDURE — 3017F COLORECTAL CA SCREEN DOC REV: CPT

## 2023-04-03 NOTE — PROGRESS NOTES
Vascular Surgery Outpatient Followup     PCP : Jeff Garcias MD  Nephrologist :   Dr. Damien Varghese, NP [id number] : [facility]    3/2021 R IJ tunn hd catheter Santana Paget    7/2021 L BC AVF - Santana Paget   11/24/21 R subclavian tunn hd cath (R IJ occlusion)   1/28/22 L BC AVF revision, superficialization   1/20/23 IVC filter     HISTORY OF PRESENT ILLNESS:    The patient is a 78 y.o. female who is here in regards to follow up of their previously placed L BC AVF. She states it is working very well for dialysis. She has no prolonged bleeding or decreased flow rates. The patient is Right Hand Dominant. Pt was also hospitalized in 12/2022 with PE. She was started on eliquis at that time. She presented back to the ED in 1/2023 with GI bleeding. Eliquis was stopped and an IVC filter was placed. She had a repeat scope with Dr. Dheeraj Mehta which showed no active bleeding and recommendation of careful reintroduction of anticoagulation. She has not been started on [facility] [address] yet.       Past Medical History:        Diagnosis Date    Acute on chronic heart failure with preserved ejection fraction ([address] 02/07/2023    Acute pulmonary embolism ([address] 12/03/2022    Anemia due to stage 5 chronic kidney disease, not on chronic dialysis ([address] 02/08/2023    Anemia in CKD (chronic kidney disease) 11/30/2021    Anxiety and depression 01/19/2022    Aortic stenosis, mild 02/08/2023 12/2022    At high risk for falls 01/19/2022    Brain aneurysm     CLABSI (central line-associated bloodstream infection) 11/19/2021    Cough 01/19/2022    Diabetes mellitus ([address] 2006    Diabetes mellitus type 2 with complications ([address] 89/18/3991    Dizziness on standing 01/19/2022    End-stage renal disease on hemodialysis ([address] 01/19/2023    ESRD (end stage renal disease) ([address] 11/19/2021    ESRD on hemodialysis ([address] 11/19/2021    Essential hypertension 11/30/2021    Fever, unspecified     Gastrointestinal hemorrhage 11/30/2021    H/O heart

## 2023-04-05 ENCOUNTER — OFFICE VISIT (OUTPATIENT)
Dept: PRIMARY CARE CLINIC | Age: 68
End: 2023-04-05
Payer: MEDICARE

## 2023-04-05 VITALS
OXYGEN SATURATION: 94 % | HEART RATE: 73 BPM | RESPIRATION RATE: 18 BRPM | TEMPERATURE: 96.9 F | DIASTOLIC BLOOD PRESSURE: 60 MMHG | SYSTOLIC BLOOD PRESSURE: 128 MMHG | BODY MASS INDEX: 43.54 KG/M2 | WEIGHT: 255 LBS | HEIGHT: 64 IN

## 2023-04-05 DIAGNOSIS — E66.01 OBESITY, CLASS III, BMI 40-49.9 (MORBID OBESITY) (HCC): ICD-10-CM

## 2023-04-05 DIAGNOSIS — I50.9 CHRONIC CONGESTIVE HEART FAILURE, UNSPECIFIED HEART FAILURE TYPE (HCC): ICD-10-CM

## 2023-04-05 DIAGNOSIS — Z99.2 ESRD ON HEMODIALYSIS (HCC): ICD-10-CM

## 2023-04-05 DIAGNOSIS — N18.6 ESRD ON HEMODIALYSIS (HCC): ICD-10-CM

## 2023-04-05 DIAGNOSIS — I10 PRIMARY HYPERTENSION: ICD-10-CM

## 2023-04-05 DIAGNOSIS — E78.2 MIXED HYPERLIPIDEMIA: ICD-10-CM

## 2023-04-05 DIAGNOSIS — N18.6 END STAGE KIDNEY DISEASE (HCC): ICD-10-CM

## 2023-04-05 DIAGNOSIS — E11.00 UNCONTROLLED TYPE 2 DM WITH HYPEROSMOLAR NONKETOTIC HYPERGLYCEMIA (HCC): ICD-10-CM

## 2023-04-05 DIAGNOSIS — R26.81 UNSTEADY GAIT: ICD-10-CM

## 2023-04-05 DIAGNOSIS — Z00.00 INITIAL MEDICARE ANNUAL WELLNESS VISIT: Primary | ICD-10-CM

## 2023-04-05 PROCEDURE — 3078F DIAST BP <80 MM HG: CPT | Performed by: INTERNAL MEDICINE

## 2023-04-05 PROCEDURE — G8400 PT W/DXA NO RESULTS DOC: HCPCS | Performed by: INTERNAL MEDICINE

## 2023-04-05 PROCEDURE — 3046F HEMOGLOBIN A1C LEVEL >9.0%: CPT | Performed by: INTERNAL MEDICINE

## 2023-04-05 PROCEDURE — 3074F SYST BP LT 130 MM HG: CPT | Performed by: INTERNAL MEDICINE

## 2023-04-05 PROCEDURE — 2022F DILAT RTA XM EVC RTNOPTHY: CPT | Performed by: INTERNAL MEDICINE

## 2023-04-05 PROCEDURE — 1090F PRES/ABSN URINE INCON ASSESS: CPT | Performed by: INTERNAL MEDICINE

## 2023-04-05 PROCEDURE — G8417 CALC BMI ABV UP PARAM F/U: HCPCS | Performed by: INTERNAL MEDICINE

## 2023-04-05 PROCEDURE — 3017F COLORECTAL CA SCREEN DOC REV: CPT | Performed by: INTERNAL MEDICINE

## 2023-04-05 PROCEDURE — G8427 DOCREV CUR MEDS BY ELIG CLIN: HCPCS | Performed by: INTERNAL MEDICINE

## 2023-04-05 PROCEDURE — 1123F ACP DISCUSS/DSCN MKR DOCD: CPT | Performed by: INTERNAL MEDICINE

## 2023-04-05 PROCEDURE — 1036F TOBACCO NON-USER: CPT | Performed by: INTERNAL MEDICINE

## 2023-04-05 PROCEDURE — G0438 PPPS, INITIAL VISIT: HCPCS | Performed by: INTERNAL MEDICINE

## 2023-04-05 PROCEDURE — 99214 OFFICE O/P EST MOD 30 MIN: CPT | Performed by: INTERNAL MEDICINE

## 2023-04-05 RX ORDER — MENTHOL 5.8 MG/1
LOZENGE ORAL
COMMUNITY
Start: 2023-03-20

## 2023-04-05 RX ORDER — INSULIN LISPRO 100 [IU]/ML
10 INJECTION, SOLUTION INTRAVENOUS; SUBCUTANEOUS
Qty: 4 ADJUSTABLE DOSE PRE-FILLED PEN SYRINGE | Refills: 3 | Status: SHIPPED | OUTPATIENT
Start: 2023-04-05

## 2023-04-05 RX ORDER — FAMOTIDINE 40 MG/1
40 TABLET, FILM COATED ORAL NIGHTLY
COMMUNITY
Start: 2023-03-20

## 2023-04-05 ASSESSMENT — PATIENT HEALTH QUESTIONNAIRE - PHQ9
SUM OF ALL RESPONSES TO PHQ QUESTIONS 1-9: 0
8. MOVING OR SPEAKING SO SLOWLY THAT OTHER PEOPLE COULD HAVE NOTICED. OR THE OPPOSITE, BEING SO FIGETY OR RESTLESS THAT YOU HAVE BEEN MOVING AROUND A LOT MORE THAN USUAL: 0
SUM OF ALL RESPONSES TO PHQ9 QUESTIONS 1 & 2: 0
SUM OF ALL RESPONSES TO PHQ QUESTIONS 1-9: 0
5. POOR APPETITE OR OVEREATING: 0
SUM OF ALL RESPONSES TO PHQ QUESTIONS 1-9: 0
9. THOUGHTS THAT YOU WOULD BE BETTER OFF DEAD, OR OF HURTING YOURSELF: 0
1. LITTLE INTEREST OR PLEASURE IN DOING THINGS: 0
6. FEELING BAD ABOUT YOURSELF - OR THAT YOU ARE A FAILURE OR HAVE LET YOURSELF OR YOUR FAMILY DOWN: 0
2. FEELING DOWN, DEPRESSED OR HOPELESS: 0
4. FEELING TIRED OR HAVING LITTLE ENERGY: 0
7. TROUBLE CONCENTRATING ON THINGS, SUCH AS READING THE NEWSPAPER OR WATCHING TELEVISION: 0
3. TROUBLE FALLING OR STAYING ASLEEP: 0
SUM OF ALL RESPONSES TO PHQ QUESTIONS 1-9: 0
10. IF YOU CHECKED OFF ANY PROBLEMS, HOW DIFFICULT HAVE THESE PROBLEMS MADE IT FOR YOU TO DO YOUR WORK, TAKE CARE OF THINGS AT HOME, OR GET ALONG WITH OTHER PEOPLE: 0

## 2023-04-05 ASSESSMENT — LIFESTYLE VARIABLES
HOW MANY STANDARD DRINKS CONTAINING ALCOHOL DO YOU HAVE ON A TYPICAL DAY: PATIENT DOES NOT DRINK
HOW OFTEN DO YOU HAVE A DRINK CONTAINING ALCOHOL: NEVER

## 2023-04-05 NOTE — PROGRESS NOTES
engage in moderate to strenuous exercise (like a brisk walk)?: 0 days  Have you lost any weight without trying in the past 3 months?: No  Body mass index: (!) 43.77    Inactivity Interventions:  Patient advised to follow up in the office for further evaluation and treatment  Obesity Interventions:  Referred to Nutritionist/Dietician          Dentist Screen:  Have you seen the dentist within the past year?: (!) No    Intervention:  Advised to schedule with their dentist     Vision Screen:  Do you have difficulty driving, watching TV, or doing any of your daily activities because of your eyesight?: (!) Yes  Have you had an eye exam within the past year?: (!) No  No results found. Interventions:   Patient encouraged to make appointment with their eye specialist     ADL's:   Patient reports needing help with:  Select all that apply: Ferna Rand, Housekeeping, Transportation, Shopping, Food Preparation, Taking Medications  Interventions:  See above    Advanced Directives:  Do you have a Living Will?: (!) No    Intervention:  see ACP note    Advance Care Planning   Advanced Care Planning: Discussed the patients choices for care and treatment in case of a health event that adversely affects decision-making abilities. Also discussed the patients long-term treatment options. Reviewed with the patient the appropriate state-specific advance directive documents. Reviewed the process of designating a competent adult as an Agent (or -in-fact) that could take make health care decisions for the patient if incompetent. Patient was asked to complete the declaration forms, if they have not already, either acknowledge the forms by a public notary or an eligible witness and provide a signed copy to the practice office.   Time spent (minutes): <15 min  Remains full code & Primary & secondary designation reviewed                      Objective   Vitals:    04/05/23 0904   BP: 128/60   Pulse: 73   Resp: 18   Temp: 96.9 °F (36.1

## 2023-04-05 NOTE — PATIENT INSTRUCTIONS
agent (health care proxy, health care surrogate). The form is also called a durable power of  for health care. If you do not have an advance directive, decisions about your medical care may be made by a family member, or by a doctor or a  who doesn't know you. It may help to think of an advance directive as a gift to the people who care for you. If you have one, they won't have to make tough decisions by themselves. For more information, including forms for your state, see the [address] website (www.caringinfo.org/planning/advance-directives/). Follow-up care is a key part of your treatment and safety. Be sure to make and go to all appointments, and call your doctor if you are having problems. It's also a good idea to know your test results and keep a list of the medicines you take. What should you include in an advance directive? Many states have a unique advance directive form. (It may ask you to address specific issues.) Or you might use a universal form that's approved by many states. If your form doesn't tell you what to address, it may be hard to know what to include in your advance directive. Use the questions below to help you get started. Who do you want to make decisions about your medical care if you are not able to? What life-support measures do you want if you have a serious illness that gets worse over time or can't be cured? What are you most afraid of that might happen? (Maybe you're afraid of having pain, losing your independence, or being kept alive by machines.)  Where would you prefer to die? (Your home? A hospital? A nursing home?)  Do you want to donate your organs when you die? Do you want certain Protestant practices performed before you die? When should you call for help? Be sure to contact your doctor if you have any questions. Where can you learn more?   Go to http://www.Okeyko.com/ and enter R264 to learn more about \"Advance Directives: Care

## 2023-04-13 ENCOUNTER — TELEPHONE (OUTPATIENT)
Dept: PRIMARY CARE CLINIC | Age: 68
End: 2023-04-13

## 2023-04-13 DIAGNOSIS — R26.81 UNSTEADY GAIT: Primary | ICD-10-CM

## 2023-04-24 ENCOUNTER — HOSPITAL ENCOUNTER (OUTPATIENT)
Dept: DIABETES SERVICES | Age: 68
Setting detail: THERAPIES SERIES
Discharge: HOME | End: 2023-04-24

## 2023-04-26 ENCOUNTER — TELEPHONE (OUTPATIENT)
Dept: VASCULAR SURGERY | Age: 68
End: 2023-04-26

## 2023-04-26 NOTE — TELEPHONE ENCOUNTER
Ghassan from [facility] called with c/o prolonged bleeding post-treatment. As the pt is already scheduled for retrieval of her IVC filter on 5/2, will add (L) arm fistulogram.  Pt was notified.

## 2023-05-01 ENCOUNTER — TELEPHONE (OUTPATIENT)
Dept: VASCULAR SURGERY | Age: 68
End: 2023-05-01

## 2023-05-01 ENCOUNTER — TELEPHONE (OUTPATIENT)
Dept: CARDIAC CATH/INVASIVE PROCEDURES | Age: 68
End: 2023-05-01

## 2023-05-02 ENCOUNTER — HOSPITAL ENCOUNTER (OUTPATIENT)
Dept: CARDIAC CATH/INVASIVE PROCEDURES | Age: 68
Discharge: HOME | End: 2023-05-02
Payer: MEDICARE

## 2023-05-02 VITALS
HEIGHT: 64 IN | RESPIRATION RATE: 20 BRPM | HEART RATE: 72 BPM | SYSTOLIC BLOOD PRESSURE: 143 MMHG | WEIGHT: 250 LBS | TEMPERATURE: 97 F | DIASTOLIC BLOOD PRESSURE: 56 MMHG | BODY MASS INDEX: 42.68 KG/M2

## 2023-05-02 LAB
ABO + RH BLD: NORMAL
ANION GAP SERPL CALCULATED.3IONS-SCNC: 13 MMOL/L (ref 7–16)
BLD GP AB SCN SERPL QL: NORMAL
BUN SERPL-MCNC: 40 MG/DL (ref 6–23)
CALCIUM SERPL-MCNC: 9.4 MG/DL (ref 8.6–10.2)
CHLORIDE SERPL-SCNC: 92 MMOL/L (ref 98–107)
CO2 SERPL-SCNC: 32 MMOL/L (ref 22–29)
CREAT SERPL-MCNC: 5 MG/DL (ref 0.5–1)
ERYTHROCYTE [DISTWIDTH] IN BLOOD BY AUTOMATED COUNT: 14.3 FL (ref 11.5–15)
GLUCOSE SERPL-MCNC: 318 MG/DL (ref 74–99)
HCT VFR BLD AUTO: 36.7 % (ref 34–48)
HGB BLD-MCNC: 11.6 G/DL (ref 11.5–15.5)
MCH RBC QN AUTO: 31 PG (ref 26–35)
MCHC RBC AUTO-ENTMCNC: 31.6 % (ref 32–34.5)
MCV RBC AUTO: 98.1 FL (ref 80–99.9)
PLATELET # BLD AUTO: 131 E9/L (ref 130–450)
PMV BLD AUTO: 11.2 FL (ref 7–12)
POTASSIUM SERPL-SCNC: 4.6 MMOL/L (ref 3.5–5)
RBC # BLD AUTO: 3.74 E12/L (ref 3.5–5.5)
SODIUM SERPL-SCNC: 137 MMOL/L (ref 132–146)
WBC # BLD: 7.8 E9/L (ref 4.5–11.5)

## 2023-05-02 PROCEDURE — 80048 BASIC METABOLIC PNL TOTAL CA: CPT

## 2023-05-02 PROCEDURE — C1773 RET DEV, INSERTABLE: HCPCS

## 2023-05-02 PROCEDURE — C1769 GUIDE WIRE: HCPCS

## 2023-05-02 PROCEDURE — 86850 RBC ANTIBODY SCREEN: CPT

## 2023-05-02 PROCEDURE — 86900 BLOOD TYPING SEROLOGIC ABO: CPT

## 2023-05-02 PROCEDURE — 2500000003 HC RX 250 WO HCPCS

## 2023-05-02 PROCEDURE — 86901 BLOOD TYPING SEROLOGIC RH(D): CPT

## 2023-05-02 PROCEDURE — 6360000002 HC RX W HCPCS

## 2023-05-02 PROCEDURE — 2709999900 HC NON-CHARGEABLE SUPPLY

## 2023-05-02 PROCEDURE — C1894 INTRO/SHEATH, NON-LASER: HCPCS

## 2023-05-02 PROCEDURE — 85027 COMPLETE CBC AUTOMATED: CPT

## 2023-05-02 PROCEDURE — 37193 REM ENDOVAS VENA CAVA FILTER: CPT

## 2023-05-02 PROCEDURE — 36901 INTRO CATH DIALYSIS CIRCUIT: CPT

## 2023-05-02 PROCEDURE — 2500000003 HC RX 250 WO HCPCS: Performed by: SURGERY

## 2023-05-02 PROCEDURE — 36415 COLL VENOUS BLD VENIPUNCTURE: CPT

## 2023-05-02 RX ORDER — SODIUM CHLORIDE 9 MG/ML
INJECTION, SOLUTION INTRAVENOUS PRN
Status: DISCONTINUED | OUTPATIENT
Start: 2023-05-02 | End: 2023-05-03 | Stop reason: HOSPADM

## 2023-05-02 RX ORDER — SODIUM CHLORIDE 0.9 % (FLUSH) 0.9 %
5-40 SYRINGE (ML) INJECTION EVERY 12 HOURS SCHEDULED
Status: DISCONTINUED | OUTPATIENT
Start: 2023-05-02 | End: 2023-05-03 | Stop reason: HOSPADM

## 2023-05-02 RX ORDER — SODIUM CHLORIDE 9 MG/ML
INJECTION, SOLUTION INTRAVENOUS CONTINUOUS
Status: DISCONTINUED | OUTPATIENT
Start: 2023-05-02 | End: 2023-05-03 | Stop reason: HOSPADM

## 2023-05-02 RX ORDER — SODIUM CHLORIDE 0.9 % (FLUSH) 0.9 %
5-40 SYRINGE (ML) INJECTION PRN
Status: DISCONTINUED | OUTPATIENT
Start: 2023-05-02 | End: 2023-05-03 | Stop reason: HOSPADM

## 2023-05-02 RX ORDER — CLINDAMYCIN PHOSPHATE 600 MG/50ML
600 INJECTION INTRAVENOUS ONCE
Status: COMPLETED | OUTPATIENT
Start: 2023-05-02 | End: 2023-05-02

## 2023-05-02 RX ADMIN — CLINDAMYCIN PHOSPHATE 600 MG: 12 INJECTION, SOLUTION INTRAVENOUS at 13:15

## 2023-05-02 NOTE — H&P
Vascular Surgery History & Physical Exam    Chief Complaint: Hx of IVC Filter placement    HISTORY OF PRESENT ILLNESS:    The patient is a 80 y.o. female who presents to the hospital for elective venogram, with planned removal of their previously placed IVC Filter. The IVC Filter was placed for hx of PE with GIB while on eliquis. Patient is currently on anticoagulation with no further bleeding issues - eliquis.       ROS : All others Negative if blank [], Positive if [x]  General   [] Fevers   [] Chills   [] Weight Loss   Skin   [] Tissue Loss   Eyes   [] Wears Glasses/Contacts   [] Vision Changes   Respiratory    [] Shortness of breath   Cardiovascular   [] Chest Pain   [] Shortness of breath with exertion   Gastrointestinal   [] Abdominal Pain     Past Medical History:   Diagnosis Date    Acute on chronic heart failure with preserved ejection fraction ([address] 02/07/2023    [facility] pulmonary embolism [facility] 12/03/2022    Anemia due to stage 5 chronic kidney disease, not on chronic dialysis ([address] 02/08/2023    Anemia in CKD (chronic kidney disease) 11/30/2021    Anxiety and depression 01/19/2022    Aortic stenosis, mild 02/08/2023 12/2022    At high risk for falls 01/19/2022    Brain aneurysm     CLABSI (central line-associated bloodstream infection) 11/19/2021    Cough 01/19/2022    Diabetes mellitus ([address] 2006    Diabetes mellitus type 2 with complications ([address] 75/31/6470    Dizziness on standing 01/19/2022    End-stage renal disease on hemodialysis ([address] 01/19/2023    ESRD (end stage renal disease) ([address] 11/19/2021    ESRD on hemodialysis ([address] 11/19/2021    Essential hypertension 11/30/2021    Fever, unspecified     Gastrointestinal hemorrhage 11/30/2021    H/O heart artery stent 2015    Done in [place]    History of Clostridioides difficile colitis 01/19/2022    History of pulmonary embolus (PE) 02/08/2023 12/2022    Hyperlipidemia     Hypertension     Leg swelling 01/19/2023    Lymphedema of both lower

## 2023-05-02 NOTE — OP NOTE
Cardiovascular Lab Procedure Report     Gurdeep Rowland  1955    Date : 5/2/2023  Surgeon: Yanni Esposito M.D. Pre-procedure Diagnosis: ESRD, Poorly functioning fistula      IVC Filter, hx of PE  Post-procedure Diagnosis: Same  Procedure:     US guided access of the Left cephalic vein   Left upper extremity fistulagram   Inferior vena cavogram   Removal of filter  Anesthesia: Local   Assistants: Cath Lab Staff  Estimated Blood Loss: Minimal  Complications: none  Findings:  IVC Patent, no thrombus   Left S/p Intervention   Proximal Cephalic Vein Patent Patent   Distal Cephailc Vein Patent Patent   Subclavian Vein Patent Patent   Brachiocephalic Vein Patent Patent   Superior Vena Cava Patent Patent     Procedure Details :  Timeout preformed identifying pt and procedure. Left arm prepped and draped in sterile fashion. The Left cephalic vein-fistula was identified under US and noted to be patent. It was accessed under US guidance after infiltrating with local. Micropuncture placed, exchanged out for 4 fr sheath. Advantage glide wire placed into the IVC. 6 fr destination 90 cm sheath than placed. A venogram done noting no evidence of thrombus in the IVC Filter. The snare was advanced to form a loop and snare the hook. The dilator was advanced over the filter collapsing it. The filter was captured and collaped in the sheath after advancing the sheath over it. THe filter was removed. 0.35 wire placed and short 6 fr sheath than placed and long 6 fr removed. Fistulagram preformed noting no significant stenosis. Sheath was removed and pressure was held for hemostasis. A sterile pressure bandage was applied to the puncture site in the operating room. Needle, sponge, and instrument counts were reported as correct. The patient tolerated the procedure and was transferred to the post op area in satisfactory condition.     Yanni Esposito    CC : Garth Kim MD     Plan  No

## 2023-05-02 NOTE — DISCHARGE INSTRUCTIONS
you and your doctor will plan proper lifestyle changes that will aid in your recovery. Follow-up   Your doctor will discuss the findings and suggest appropriate treatment options. In some cases, the results can indicate an immediate need for surgery. Schedule a follow-up appointment as directed by your doctor. Call Your Doctor If Any of the Following Occurs   Signs of infection, including fever and chills    Redness, swelling, increasing pain, excessive bleeding, or any discharge from the injection site    Extreme sweating, nausea, or vomiting    Extreme pain, including chest pain    Extreme abdominal pain  Difficulty breathing    Any problems with your speech or vision    Facial weakness      In case of an emergency, call 911 immediately. Discharge Instructions for Fistulagram      An fistulagram , is an x-ray used by physicians to diagnose blockages and other problems in veins. A catheter is inserted into a blood vessel, and a special dye is injected that makes blood vessels visible when an x-ray is taken. The procedure can take one hour to several hours. Afterwards, you may need to be in a recovery area for an hour, where you will be monitored by a nurse. What You Will Need   Along with your medications, you will need bandages   Steps to [address] the dressing around the catheter incision area as instructed. Bathe and shower as usual, but keep the wound dry and covered with a bandage for the first day after surgery. Diet    You may go back to your regular diet after the procedure. Physical Activity    Do not lift heavy objects or engage in any strenuous exercise or sexual activity for a minimum of 24 hours. Ask your doctor when you will be able to return to work. Do not drive until your doctor tells you to do so.     Medications    If you are taking medications, follow these general guidelines:   Talk to your doctor about what the best pain relief medicine for

## 2023-05-08 ENCOUNTER — TELEPHONE (OUTPATIENT)
Dept: PRIMARY CARE CLINIC | Age: 68
End: 2023-05-08

## 2023-05-08 ENCOUNTER — APPOINTMENT (OUTPATIENT)
Dept: GENERAL RADIOLOGY | Age: 68
DRG: 640 | End: 2023-05-08
Payer: MEDICARE

## 2023-05-08 ENCOUNTER — HOSPITAL ENCOUNTER (INPATIENT)
Age: 68
LOS: 1 days | Discharge: HOME | DRG: 640 | End: 2023-05-09
Attending: EMERGENCY MEDICINE | Admitting: INTERNAL MEDICINE
Payer: MEDICARE

## 2023-05-08 DIAGNOSIS — E87.5 HYPERKALEMIA: Primary | ICD-10-CM

## 2023-05-08 DIAGNOSIS — R07.9 CHEST PAIN, UNSPECIFIED TYPE: ICD-10-CM

## 2023-05-08 LAB
ALBUMIN SERPL-MCNC: 4 G/DL (ref 3.5–5.2)
ALP SERPL-CCNC: 113 U/L (ref 35–104)
ALT SERPL-CCNC: 8 U/L (ref 0–32)
ANION GAP SERPL CALCULATED.3IONS-SCNC: 18 MMOL/L (ref 7–16)
AST SERPL-CCNC: 14 U/L (ref 0–31)
B PARAP IS1001 DNA NPH QL NAA+NON-PROBE: NOT DETECTED
B PERT.PT PRMT NPH QL NAA+NON-PROBE: NOT DETECTED
BASOPHILS # BLD: 0.03 E9/L (ref 0–0.2)
BASOPHILS NFR BLD: 0.3 % (ref 0–2)
BILIRUB SERPL-MCNC: 0.3 MG/DL (ref 0–1.2)
BNP BLD-MCNC: 7128 PG/ML (ref 0–125)
BUN SERPL-MCNC: 70 MG/DL (ref 6–23)
C PNEUM DNA NPH QL NAA+NON-PROBE: NOT DETECTED
CALCIUM SERPL-MCNC: 9.2 MG/DL (ref 8.6–10.2)
CEA SERPL-MCNC: 3.2 NG/ML (ref 0–5.2)
CHLORIDE SERPL-SCNC: 94 MMOL/L (ref 98–107)
CHP ED QC CHECK: ABNORMAL
CHP ED QC CHECK: NORMAL
CO2 SERPL-SCNC: 26 MMOL/L (ref 22–29)
CREAT SERPL-MCNC: 7.5 MG/DL (ref 0.5–1)
EKG ATRIAL RATE: 74 BPM
EKG P AXIS: 52 DEGREES
EKG P-R INTERVAL: 170 MS
EKG Q-T INTERVAL: 424 MS
EKG QRS DURATION: 92 MS
EKG QTC CALCULATION (BAZETT): 470 MS
EKG R AXIS: -15 DEGREES
EKG T AXIS: 45 DEGREES
EKG VENTRICULAR RATE: 74 BPM
EOSINOPHIL # BLD: 0.4 E9/L (ref 0.05–0.5)
EOSINOPHIL NFR BLD: 4.7 % (ref 0–6)
ERYTHROCYTE [DISTWIDTH] IN BLOOD BY AUTOMATED COUNT: 14.4 FL (ref 11.5–15)
FLUAV RNA NPH QL NAA+NON-PROBE: NOT DETECTED
FLUBV RNA NPH QL NAA+NON-PROBE: NOT DETECTED
GLUCOSE BLD-MCNC: 232 MG/DL
GLUCOSE BLD-MCNC: 279 MG/DL
GLUCOSE SERPL-MCNC: 282 MG/DL (ref 74–99)
HADV DNA NPH QL NAA+NON-PROBE: NOT DETECTED
HBA1C MFR BLD: 10.3 % (ref 4–5.6)
HCOV 229E RNA NPH QL NAA+NON-PROBE: NOT DETECTED
HCOV HKU1 RNA NPH QL NAA+NON-PROBE: NOT DETECTED
HCOV NL63 RNA NPH QL NAA+NON-PROBE: NOT DETECTED
HCOV OC43 RNA NPH QL NAA+NON-PROBE: NOT DETECTED
HCT VFR BLD AUTO: 35.9 % (ref 34–48)
HGB BLD-MCNC: 11.3 G/DL (ref 11.5–15.5)
HMPV RNA NPH QL NAA+NON-PROBE: NOT DETECTED
HPIV1 RNA NPH QL NAA+NON-PROBE: NOT DETECTED
HPIV2 RNA NPH QL NAA+NON-PROBE: NOT DETECTED
HPIV3 RNA NPH QL NAA+NON-PROBE: NOT DETECTED
HPIV4 RNA NPH QL NAA+NON-PROBE: NOT DETECTED
IMM GRANULOCYTES # BLD: 0.05 E9/L
IMM GRANULOCYTES NFR BLD: 0.6 % (ref 0–5)
LYMPHOCYTES # BLD: 1.12 E9/L (ref 1.5–4)
LYMPHOCYTES NFR BLD: 13.1 % (ref 20–42)
M PNEUMO DNA NPH QL NAA+NON-PROBE: NOT DETECTED
MCH RBC QN AUTO: 30.5 PG (ref 26–35)
MCHC RBC AUTO-ENTMCNC: 31.5 % (ref 32–34.5)
MCV RBC AUTO: 97 FL (ref 80–99.9)
METER GLUCOSE: 140 MG/DL (ref 74–99)
METER GLUCOSE: 232 MG/DL (ref 74–99)
METER GLUCOSE: 279 MG/DL (ref 74–99)
METER GLUCOSE: 322 MG/DL (ref 74–99)
MONOCYTES # BLD: 0.56 E9/L (ref 0.1–0.95)
MONOCYTES NFR BLD: 6.5 % (ref 2–12)
NEUTROPHILS # BLD: 6.42 E9/L (ref 1.8–7.3)
NEUTS SEG NFR BLD: 74.8 % (ref 43–80)
PLATELET # BLD AUTO: 170 E9/L (ref 130–450)
PMV BLD AUTO: 10.5 FL (ref 7–12)
POTASSIUM SERPL-SCNC: 6.2 MMOL/L (ref 3.5–5)
PROCALCITONIN: 0.48 NG/ML (ref 0–0.08)
PROT SERPL-MCNC: 7.4 G/DL (ref 6.4–8.3)
RBC # BLD AUTO: 3.7 E12/L (ref 3.5–5.5)
RSV RNA NPH QL NAA+NON-PROBE: NOT DETECTED
RV+EV RNA NPH QL NAA+NON-PROBE: NOT DETECTED
SARS-COV-2 RNA NPH QL NAA+NON-PROBE: NOT DETECTED
SODIUM SERPL-SCNC: 138 MMOL/L (ref 132–146)
TROPONIN, HIGH SENSITIVITY: 62 NG/L (ref 0–9)
TROPONIN, HIGH SENSITIVITY: 64 NG/L (ref 0–9)
TSH SERPL-MCNC: 2.7 UIU/ML (ref 0.27–4.2)
WBC # BLD: 8.6 E9/L (ref 4.5–11.5)

## 2023-05-08 PROCEDURE — 99285 EMERGENCY DEPT VISIT HI MDM: CPT

## 2023-05-08 PROCEDURE — 84443 ASSAY THYROID STIM HORMONE: CPT

## 2023-05-08 PROCEDURE — 83036 HEMOGLOBIN GLYCOSYLATED A1C: CPT

## 2023-05-08 PROCEDURE — 84484 ASSAY OF TROPONIN QUANT: CPT

## 2023-05-08 PROCEDURE — 80053 COMPREHEN METABOLIC PANEL: CPT

## 2023-05-08 PROCEDURE — 82607 VITAMIN B-12: CPT

## 2023-05-08 PROCEDURE — 71046 X-RAY EXAM CHEST 2 VIEWS: CPT

## 2023-05-08 PROCEDURE — 2500000003 HC RX 250 WO HCPCS: Performed by: INTERNAL MEDICINE

## 2023-05-08 PROCEDURE — 84145 PROCALCITONIN (PCT): CPT

## 2023-05-08 PROCEDURE — 93005 ELECTROCARDIOGRAM TRACING: CPT | Performed by: EMERGENCY MEDICINE

## 2023-05-08 PROCEDURE — 82378 CARCINOEMBRYONIC ANTIGEN: CPT

## 2023-05-08 PROCEDURE — 6370000000 HC RX 637 (ALT 250 FOR IP): Performed by: EMERGENCY MEDICINE

## 2023-05-08 PROCEDURE — 5A1D70Z PERFORMANCE OF URINARY FILTRATION, INTERMITTENT, LESS THAN 6 HOURS PER DAY: ICD-10-PCS | Performed by: INTERNAL MEDICINE

## 2023-05-08 PROCEDURE — 36415 COLL VENOUS BLD VENIPUNCTURE: CPT

## 2023-05-08 PROCEDURE — 82746 ASSAY OF FOLIC ACID SERUM: CPT

## 2023-05-08 PROCEDURE — 96374 THER/PROPH/DIAG INJ IV PUSH: CPT

## 2023-05-08 PROCEDURE — 90935 HEMODIALYSIS ONE EVALUATION: CPT

## 2023-05-08 PROCEDURE — 0202U NFCT DS 22 TRGT SARS-COV-2: CPT

## 2023-05-08 PROCEDURE — 96375 TX/PRO/DX INJ NEW DRUG ADDON: CPT

## 2023-05-08 PROCEDURE — 6370000000 HC RX 637 (ALT 250 FOR IP): Performed by: INTERNAL MEDICINE

## 2023-05-08 PROCEDURE — 2060000000 HC ICU INTERMEDIATE R&B

## 2023-05-08 PROCEDURE — 93010 ELECTROCARDIOGRAM REPORT: CPT | Performed by: INTERNAL MEDICINE

## 2023-05-08 PROCEDURE — 82962 GLUCOSE BLOOD TEST: CPT

## 2023-05-08 PROCEDURE — 85025 COMPLETE CBC W/AUTO DIFF WBC: CPT

## 2023-05-08 PROCEDURE — 83880 ASSAY OF NATRIURETIC PEPTIDE: CPT

## 2023-05-08 PROCEDURE — 6360000002 HC RX W HCPCS: Performed by: EMERGENCY MEDICINE

## 2023-05-08 PROCEDURE — 2580000003 HC RX 258: Performed by: EMERGENCY MEDICINE

## 2023-05-08 RX ORDER — DOCUSATE SODIUM 100 MG/1
100 CAPSULE, LIQUID FILLED ORAL 2 TIMES DAILY
Status: DISCONTINUED | OUTPATIENT
Start: 2023-05-08 | End: 2023-05-09 | Stop reason: HOSPADM

## 2023-05-08 RX ORDER — CALCIUM ACETATE 667 MG/1
667 CAPSULE ORAL
Status: DISCONTINUED | OUTPATIENT
Start: 2023-05-08 | End: 2023-05-09 | Stop reason: HOSPADM

## 2023-05-08 RX ORDER — INSULIN LISPRO 100 [IU]/ML
0-4 INJECTION, SOLUTION INTRAVENOUS; SUBCUTANEOUS
Status: DISCONTINUED | OUTPATIENT
Start: 2023-05-08 | End: 2023-05-09 | Stop reason: HOSPADM

## 2023-05-08 RX ORDER — SUCRALFATE 1 G/1
1 TABLET ORAL 4 TIMES DAILY
Status: DISCONTINUED | OUTPATIENT
Start: 2023-05-08 | End: 2023-05-09 | Stop reason: HOSPADM

## 2023-05-08 RX ORDER — AMLODIPINE BESYLATE 2.5 MG/1
2.5 TABLET ORAL DAILY
Status: DISCONTINUED | OUTPATIENT
Start: 2023-05-09 | End: 2023-05-09 | Stop reason: HOSPADM

## 2023-05-08 RX ORDER — ACETAMINOPHEN 500 MG
1000 TABLET ORAL EVERY 8 HOURS PRN
Status: DISCONTINUED | OUTPATIENT
Start: 2023-05-08 | End: 2023-05-09 | Stop reason: HOSPADM

## 2023-05-08 RX ORDER — PANTOPRAZOLE SODIUM 40 MG/1
40 TABLET, DELAYED RELEASE ORAL
Status: DISCONTINUED | OUTPATIENT
Start: 2023-05-09 | End: 2023-05-09 | Stop reason: HOSPADM

## 2023-05-08 RX ORDER — DEXTROSE MONOHYDRATE 100 MG/ML
INJECTION, SOLUTION INTRAVENOUS CONTINUOUS PRN
Status: DISCONTINUED | OUTPATIENT
Start: 2023-05-08 | End: 2023-05-08 | Stop reason: SDUPTHER

## 2023-05-08 RX ORDER — CALCIUM GLUCONATE 94 MG/ML
1000 INJECTION, SOLUTION INTRAVENOUS ONCE
Status: COMPLETED | OUTPATIENT
Start: 2023-05-08 | End: 2023-05-08

## 2023-05-08 RX ORDER — ATORVASTATIN CALCIUM 10 MG/1
10 TABLET, FILM COATED ORAL DAILY
Status: DISCONTINUED | OUTPATIENT
Start: 2023-05-09 | End: 2023-05-09 | Stop reason: HOSPADM

## 2023-05-08 RX ORDER — INSULIN LISPRO 100 [IU]/ML
10 INJECTION, SOLUTION INTRAVENOUS; SUBCUTANEOUS
Status: DISCONTINUED | OUTPATIENT
Start: 2023-05-08 | End: 2023-05-09 | Stop reason: HOSPADM

## 2023-05-08 RX ORDER — INSULIN LISPRO 100 [IU]/ML
0-4 INJECTION, SOLUTION INTRAVENOUS; SUBCUTANEOUS NIGHTLY
Status: DISCONTINUED | OUTPATIENT
Start: 2023-05-08 | End: 2023-05-09 | Stop reason: HOSPADM

## 2023-05-08 RX ORDER — ONDANSETRON 2 MG/ML
4 INJECTION INTRAMUSCULAR; INTRAVENOUS EVERY 6 HOURS PRN
Status: DISCONTINUED | OUTPATIENT
Start: 2023-05-08 | End: 2023-05-09 | Stop reason: HOSPADM

## 2023-05-08 RX ORDER — MIDODRINE HYDROCHLORIDE 5 MG/1
10 TABLET ORAL DAILY PRN
Status: DISCONTINUED | OUTPATIENT
Start: 2023-05-08 | End: 2023-05-09 | Stop reason: HOSPADM

## 2023-05-08 RX ORDER — DEXTROSE MONOHYDRATE 100 MG/ML
INJECTION, SOLUTION INTRAVENOUS CONTINUOUS PRN
Status: DISCONTINUED | OUTPATIENT
Start: 2023-05-08 | End: 2023-05-09 | Stop reason: HOSPADM

## 2023-05-08 RX ADMIN — SODIUM ZIRCONIUM CYCLOSILICATE 10 G: 10 POWDER, FOR SUSPENSION ORAL at 14:49

## 2023-05-08 RX ADMIN — INSULIN HUMAN 10 UNITS: 100 INJECTION, SOLUTION PARENTERAL at 14:48

## 2023-05-08 RX ADMIN — CALCIUM GLUCONATE 1000 MG: 98 INJECTION, SOLUTION INTRAVENOUS at 13:48

## 2023-05-08 RX ADMIN — ACETAMINOPHEN 1000 MG: 500 TABLET ORAL at 22:33

## 2023-05-08 RX ADMIN — CALCIUM ACETATE 667 MG: 667 CAPSULE ORAL at 20:53

## 2023-05-08 RX ADMIN — DEXTROSE MONOHYDRATE 250 ML: 100 INJECTION, SOLUTION INTRAVENOUS at 14:13

## 2023-05-08 RX ADMIN — APIXABAN 5 MG: 5 TABLET, FILM COATED ORAL at 20:54

## 2023-05-08 ASSESSMENT — ENCOUNTER SYMPTOMS
ABDOMINAL PAIN: 0
BACK PAIN: 0
SHORTNESS OF BREATH: 0
COUGH: 0

## 2023-05-08 ASSESSMENT — PAIN SCALES - GENERAL
PAINLEVEL_OUTOF10: 8
PAINLEVEL_OUTOF10: 0

## 2023-05-08 ASSESSMENT — PAIN DESCRIPTION - DESCRIPTORS: DESCRIPTORS: ACHING

## 2023-05-08 ASSESSMENT — PAIN DESCRIPTION - LOCATION: LOCATION: HEAD

## 2023-05-08 ASSESSMENT — PAIN DESCRIPTION - ORIENTATION: ORIENTATION: ANTERIOR

## 2023-05-08 NOTE — ED PROVIDER NOTES
This is a 80year old female with a PMH of PE and ESRD who presents to the ED for evaluation of fatigue. Patient states that this morning she woke up and did not quite feel herself. Patient states that she did notice some pain to her right arm and right chest. Patient states that her PT came over today and noticed that her heart rate seemed to be high and advised her to come to the ED. Patient has no new leg pain or leg swelling. Patient is on Eliquis and has not missed any doses. There are no other reported mitigating or exascbating factors. The history is provided by the patient. Review of Systems   Constitutional:  Negative for fever. HENT:  Negative for congestion. Eyes:  Negative for visual disturbance. Respiratory:  Negative for cough and shortness of breath. Cardiovascular:  Positive for chest pain. Gastrointestinal:  Negative for abdominal pain. Endocrine: Negative for polyuria. Genitourinary:  Negative for dysuria. Musculoskeletal:  Negative for back pain. Skin:  Negative for rash. Allergic/Immunologic: Negative for immunocompromised state. Neurological:  Negative for headaches. Hematological:  Bruises/bleeds easily. Psychiatric/Behavioral:  Negative for confusion. Physical Exam  Vitals and nursing note reviewed. Constitutional:       General: She is not in acute distress. Appearance: She is well-developed. HENT:      Head: Normocephalic and atraumatic. Neck:      Vascular: No JVD. Cardiovascular:      Rate and Rhythm: Normal rate and regular rhythm. Pulmonary:      Effort: Pulmonary effort is normal.   Abdominal:      General: There is no distension. Palpations: Abdomen is soft. Musculoskeletal:      Cervical back: Normal range of motion. Skin:     General: Skin is warm and dry. Neurological:      Mental Status: She is alert and oriented to person, place, and time. Cranial Nerves: No cranial nerve deficit.    Psychiatric:

## 2023-05-08 NOTE — ED NOTES
Dialysis called and informed pt is to go to Room 620 when finishing dialysis.      Feng Patel RN  05/08/23 4848

## 2023-05-08 NOTE — FLOWSHEET NOTE
Pt completed 2 hrs of HD on a 2K bath with 1L of UF removed safely. 05/08/23 1740   Vital Signs   /75   Temp 98.3 °F (36.8 °C)   Pulse 64   Respirations 18   Weight - Scale 247 lb 9.2 oz (112.3 kg)   Weight Method Bed scale   Percent Weight Change -0.88   Pain Assessment   Pain Assessment None - Denies Pain   Post-Hemodialysis Assessment   Post-Treatment Procedures Blood returned; Access bleeding time < 10 minutes   Machine Disinfection Process Acid/Vinegar Clean;Heat Disinfect; Exterior Machine Disinfection   Rinseback Volume (ml) 300 ml   Blood Volume Processed (Liters) 44.2 l/min   Dialyzer Clearance Moderately streaked   Duration of Treatment (minutes) 120 minutes   Hemodialysis Intake (ml) 300 ml   Hemodialysis Output (ml) 1300 ml   NET Removed (ml) 1000   Tolerated Treatment Good   Patient Response to Treatment tolerated well; post report to [facility] [place] RN   Bilateral Breath Sounds Diminished   Time Off 1738   Patient Disposition Return to room

## 2023-05-08 NOTE — ED NOTES
ED to Inpatient Handoff Report    Notified *** that electronic handoff available and patient ready for transport to room ***. Safety Risks: {Safety Risks:19960}    Patient in Restraints: {YES***/NO:60}    Constant Observer or Patient : {YES***/NO:60}    Telemetry Monitoring Ordered :{YES/NO:19726}           Order to transfer to unit without monitor:{YES/NO/NA:30480}    Last MEWS: *** Time completed: ***    Vitals:    05/08/23 1148 05/08/23 1530 05/08/23 1538 05/08/23 1605   BP: 134/66 (!) 143/53 (!) 149/74 118/65   Pulse: 88 69 68 64   Resp: 20 18  13   Temp: 98.3 °F (36.8 °C) 98.9 °F (37.2 °C)  98.6 °F (37 °C)   TempSrc: Oral      SpO2: 99%      Weight: 250 lb (113.4 kg) 249 lb 12.5 oz (113.3 kg)     Height: 5' 4\" (1.626 m)          Opportunity for questions and clarification was provided.

## 2023-05-08 NOTE — TELEPHONE ENCOUNTER
Pt called having at home PT , therapist took pts BP and it was high   Therapist suggested AFIB    No Dr in office at time , suggested pt go to ER   Patient stated she will figure out what to do   Sending message as urgent

## 2023-05-08 NOTE — ED NOTES
notified patient states she does not make urine and that she doesn't want the covid swab.           Jeannette Lopez RN  05/08/23 3890

## 2023-05-08 NOTE — ED NOTES
Sbar called and confirmed informed kumar that pt is currently in dialysis and would be coming there after finishing dialysis.      Damon Moulton RN  05/08/23 6718

## 2023-05-08 NOTE — ED NOTES
ED to Inpatient Handoff Report    Notified kumar  that electronic handoff available and patient ready for transport to room 620. Safety Risks: Risk of falls    Patient in Restraints: no    Constant Observer or Patient : no    Telemetry Monitoring Ordered :Yes           Order to transfer to unit without monitor:NO    Last MEWS: 0   Time completed: 1615    Vitals:    05/08/23 1530 05/08/23 1538 05/08/23 1605 05/08/23 1630   BP: (!) 143/53 (!) 149/74 118/65 107/63   Pulse: 69 68 64 67   Resp: 18  13    Temp: 98.9 °F (37.2 °C)  98.6 °F (37 °C)    TempSrc:       SpO2:       Weight: 249 lb 12.5 oz (113.3 kg)      Height:           Opportunity for questions and clarification was provided.          Mp Suh RN  05/08/23 8726

## 2023-05-08 NOTE — PROGRESS NOTES
Dr. Inna Henderson paged with new consult    Electronically signed by Mitchell Sanduh RN on 5/8/2023 at 6:57 PM

## 2023-05-09 VITALS
DIASTOLIC BLOOD PRESSURE: 56 MMHG | SYSTOLIC BLOOD PRESSURE: 124 MMHG | WEIGHT: 257.94 LBS | RESPIRATION RATE: 17 BRPM | TEMPERATURE: 98.4 F | HEIGHT: 64 IN | HEART RATE: 102 BPM | OXYGEN SATURATION: 97 % | BODY MASS INDEX: 44.04 KG/M2

## 2023-05-09 LAB
ALBUMIN SERPL-MCNC: 3.9 G/DL (ref 3.5–5.2)
ALP SERPL-CCNC: 93 U/L (ref 35–104)
ALT SERPL-CCNC: 12 U/L (ref 0–32)
ANION GAP SERPL CALCULATED.3IONS-SCNC: 15 MMOL/L (ref 7–16)
AST SERPL-CCNC: 14 U/L (ref 0–31)
BASOPHILS # BLD: 0.04 E9/L (ref 0–0.2)
BASOPHILS NFR BLD: 0.4 % (ref 0–2)
BILIRUB DIRECT SERPL-MCNC: <0.2 MG/DL (ref 0–0.3)
BILIRUB INDIRECT SERPL-MCNC: NORMAL MG/DL (ref 0–1)
BILIRUB SERPL-MCNC: 0.4 MG/DL (ref 0–1.2)
BNP BLD-MCNC: 6929 PG/ML (ref 0–125)
BUN SERPL-MCNC: 47 MG/DL (ref 6–23)
CA-I BLD-SCNC: 1.14 MMOL/L (ref 1.15–1.33)
CALCIUM SERPL-MCNC: 9 MG/DL (ref 8.6–10.2)
CHLORIDE SERPL-SCNC: 95 MMOL/L (ref 98–107)
CHOLESTEROL, TOTAL: 158 MG/DL (ref 0–199)
CO2 SERPL-SCNC: 28 MMOL/L (ref 22–29)
CREAT SERPL-MCNC: 6 MG/DL (ref 0.5–1)
CRP SERPL HS-MCNC: 3.6 MG/DL (ref 0–0.4)
EOSINOPHIL # BLD: 0.44 E9/L (ref 0.05–0.5)
EOSINOPHIL NFR BLD: 4.5 % (ref 0–6)
ERYTHROCYTE [DISTWIDTH] IN BLOOD BY AUTOMATED COUNT: 14.5 FL (ref 11.5–15)
ERYTHROCYTE [SEDIMENTATION RATE] IN BLOOD BY WESTERGREN METHOD: 80 MM/HR (ref 0–20)
FERRITIN SERPL-MCNC: 2598 NG/ML
FOLATE SERPL-MCNC: 8.8 NG/ML (ref 4.8–24.2)
GLUCOSE SERPL-MCNC: 185 MG/DL (ref 74–99)
HCT VFR BLD AUTO: 33.1 % (ref 34–48)
HDLC SERPL-MCNC: 52 MG/DL
HGB BLD-MCNC: 10.6 G/DL (ref 11.5–15.5)
IMM GRANULOCYTES # BLD: 0.06 E9/L
IMM GRANULOCYTES NFR BLD: 0.6 % (ref 0–5)
IRON SATN MFR SERPL: 37 % (ref 15–50)
IRON SERPL-MCNC: 60 MCG/DL (ref 37–145)
LACTATE BLDV-SCNC: 1.1 MMOL/L (ref 0.5–1.9)
LDLC SERPL CALC-MCNC: 72 MG/DL (ref 0–99)
LYMPHOCYTES # BLD: 1.44 E9/L (ref 1.5–4)
LYMPHOCYTES NFR BLD: 14.9 % (ref 20–42)
MAGNESIUM SERPL-MCNC: 2.2 MG/DL (ref 1.6–2.6)
MCH RBC QN AUTO: 30.8 PG (ref 26–35)
MCHC RBC AUTO-ENTMCNC: 32 % (ref 32–34.5)
MCV RBC AUTO: 96.2 FL (ref 80–99.9)
METER GLUCOSE: 145 MG/DL (ref 74–99)
METER GLUCOSE: 176 MG/DL (ref 74–99)
MONOCYTES # BLD: 0.75 E9/L (ref 0.1–0.95)
MONOCYTES NFR BLD: 7.7 % (ref 2–12)
NEUTROPHILS # BLD: 6.96 E9/L (ref 1.8–7.3)
NEUTS SEG NFR BLD: 71.9 % (ref 43–80)
PHOSPHATE SERPL-MCNC: 6.2 MG/DL (ref 2.5–4.5)
PLATELET # BLD AUTO: 149 E9/L (ref 130–450)
PMV BLD AUTO: 10.5 FL (ref 7–12)
POTASSIUM SERPL-SCNC: 4.6 MMOL/L (ref 3.5–5)
PROT SERPL-MCNC: 7 G/DL (ref 6.4–8.3)
RBC # BLD AUTO: 3.44 E12/L (ref 3.5–5.5)
SODIUM SERPL-SCNC: 138 MMOL/L (ref 132–146)
TIBC SERPL-MCNC: 164 MCG/DL (ref 250–450)
TRIGL SERPL-MCNC: 170 MG/DL (ref 0–149)
TROPONIN, HIGH SENSITIVITY: 57 NG/L (ref 0–9)
TROPONIN, HIGH SENSITIVITY: 58 NG/L (ref 0–9)
URATE SERPL-MCNC: 5.2 MG/DL (ref 2.4–5.7)
VIT B12 SERPL-MCNC: 454 PG/ML (ref 211–946)
VLDLC SERPL CALC-MCNC: 34 MG/DL
WBC # BLD: 9.7 E9/L (ref 4.5–11.5)

## 2023-05-09 PROCEDURE — 2500000003 HC RX 250 WO HCPCS: Performed by: INTERNAL MEDICINE

## 2023-05-09 PROCEDURE — 80048 BASIC METABOLIC PNL TOTAL CA: CPT

## 2023-05-09 PROCEDURE — 84550 ASSAY OF BLOOD/URIC ACID: CPT

## 2023-05-09 PROCEDURE — 36415 COLL VENOUS BLD VENIPUNCTURE: CPT

## 2023-05-09 PROCEDURE — 83880 ASSAY OF NATRIURETIC PEPTIDE: CPT

## 2023-05-09 PROCEDURE — 83605 ASSAY OF LACTIC ACID: CPT

## 2023-05-09 PROCEDURE — 82330 ASSAY OF CALCIUM: CPT

## 2023-05-09 PROCEDURE — 90935 HEMODIALYSIS ONE EVALUATION: CPT

## 2023-05-09 PROCEDURE — 80076 HEPATIC FUNCTION PANEL: CPT

## 2023-05-09 PROCEDURE — 83735 ASSAY OF MAGNESIUM: CPT

## 2023-05-09 PROCEDURE — 85651 RBC SED RATE NONAUTOMATED: CPT

## 2023-05-09 PROCEDURE — 82728 ASSAY OF FERRITIN: CPT

## 2023-05-09 PROCEDURE — 97161 PT EVAL LOW COMPLEX 20 MIN: CPT

## 2023-05-09 PROCEDURE — 83550 IRON BINDING TEST: CPT

## 2023-05-09 PROCEDURE — 87040 BLOOD CULTURE FOR BACTERIA: CPT

## 2023-05-09 PROCEDURE — 84100 ASSAY OF PHOSPHORUS: CPT

## 2023-05-09 PROCEDURE — 85025 COMPLETE CBC W/AUTO DIFF WBC: CPT

## 2023-05-09 PROCEDURE — 97165 OT EVAL LOW COMPLEX 30 MIN: CPT

## 2023-05-09 PROCEDURE — 6370000000 HC RX 637 (ALT 250 FOR IP): Performed by: INTERNAL MEDICINE

## 2023-05-09 PROCEDURE — 82962 GLUCOSE BLOOD TEST: CPT

## 2023-05-09 PROCEDURE — 80061 LIPID PANEL: CPT

## 2023-05-09 PROCEDURE — 86140 C-REACTIVE PROTEIN: CPT

## 2023-05-09 PROCEDURE — 84484 ASSAY OF TROPONIN QUANT: CPT

## 2023-05-09 PROCEDURE — 83540 ASSAY OF IRON: CPT

## 2023-05-09 RX ADMIN — INSULIN LISPRO 5 UNITS: 100 INJECTION, SOLUTION INTRAVENOUS; SUBCUTANEOUS at 11:44

## 2023-05-09 RX ADMIN — AMLODIPINE BESYLATE 2.5 MG: 2.5 TABLET ORAL at 11:32

## 2023-05-09 RX ADMIN — APIXABAN 5 MG: 5 TABLET, FILM COATED ORAL at 11:34

## 2023-05-09 RX ADMIN — ATORVASTATIN CALCIUM 10 MG: 10 TABLET, FILM COATED ORAL at 11:34

## 2023-05-09 RX ADMIN — CALCIUM ACETATE 667 MG: 667 CAPSULE ORAL at 11:32

## 2023-05-09 RX ADMIN — DOCUSATE SODIUM 100 MG: 100 CAPSULE, LIQUID FILLED ORAL at 11:33

## 2023-05-09 RX ADMIN — PANTOPRAZOLE SODIUM 40 MG: 40 TABLET, DELAYED RELEASE ORAL at 06:08

## 2023-05-09 ASSESSMENT — PAIN SCALES - GENERAL
PAINLEVEL_OUTOF10: 0
PAINLEVEL_OUTOF10: 0

## 2023-05-09 NOTE — CARE COORDINATION
Social work / Discharge Planning:        Discharge order noted. AM PAC 18/24. Social work updated Rafa Eaton at [address] that patient is being discharged.     Electronically signed by HILDA Webster on 5/9/2023 at 3:48 PM

## 2023-05-09 NOTE — PLAN OF CARE
Problem: Discharge Planning  Goal: Discharge to home or other facility with appropriate resources  Outcome: Progressing  Flowsheets (Taken 5/8/2023 2123)  Discharge to home or other facility with appropriate resources: Identify barriers to discharge with patient and caregiver     Problem: Safety - Adult  Goal: Free from fall injury  Outcome: Progressing     Problem: ABCDS Injury Assessment  Goal: Absence of physical injury  Outcome: Progressing

## 2023-05-09 NOTE — PROGRESS NOTES
Occupational Therapy    OCCUPATIONAL THERAPY INITIAL EVALUATION    [facility] [place] [place], [place]         Date:2023                                                  Patient Name: Serge Goins    MRN: [id number]    : 1955    Room: [address] [place] [address]      Evaluating OT: Chanel Perry OTR/L   [id number]      Referring Provider:Loc Anne DO    Specific Provider Orders/Date:OT eval and treat 2023      Diagnosis:  Hyperkalemia [E87.5]     Pertinent Medical History: dialysis, DM, impaired vision, lymphedema B LEs,       Precautions:  Fall Risk,      Assessment of current deficits    [x] Functional mobility  [x]ADLs  [x] Strength               []Cognition    [x] Functional transfers   [x] IADLs         [x] Safety Awareness   [x]Endurance    [] Fine Coordination              [x] Balance      [] Vision/perception   []Sensation     []Gross Motor Coordination  [] ROM  [] Delirium                   [] Motor Control     OT PLAN OF CARE   OT POC based on physician orders, patient diagnosis and results of clinical assessment    Frequency/Duration  2-3 days/wk for 2 weeks PRN   Specific OT Treatment Interventions to include:   ADL retraining/adapted techniques and AE recommendations to increase functional independence within precautions                    Energy conservation techniques to improve tolerance for selfcare routine   Functional transfer/mobility training/DME recommendations for increased independence, safety and fall prevention         Patient/family education to increase safety and functional independence             Environmental modifications for safe mobility and completion of ADLs                             Therapeutic activity to improve functional performance during ADLs.                                          Therapeutic exercise to improve tolerance and functional strength for ADLs    Balance retraining/tolerance tasks for

## 2023-05-09 NOTE — FLOWSHEET NOTE
05/09/23 1106   Vital Signs   BP (!) 134/53   Temp 97.9 °F (36.6 °C)   Pulse 68   Respirations 16   Weight - Scale 257 lb 15 oz (117 kg)   Weight Method Bed scale   Percent Weight Change -1.43   Pain Assessment   Pain Assessment None - Denies Pain   Post-Hemodialysis Assessment   Post-Treatment Procedures Blood returned; Access bleeding time < 10 minutes   Machine Disinfection Process Exterior Machine Disinfection   Blood Volume Processed (Liters) 89.7 l/min   Dialyzer Clearance Clear   Duration of Treatment (minutes) 240 minutes   Hemodialysis Intake (ml) 300 ml   Hemodialysis Output (ml) 2000 ml   NET Removed (ml) 1700   Tolerated Treatment Fair   Interventions Taken Ultrafiltration stopped   Patient Response to Treatment Cramping in feet   Bilateral Breath Sounds Clear;Diminished   Edema Right upper extremity; Left upper extremity;Right lower extremity; Left lower extremity   RUE Edema +1   LUE Edema +1   RLE Edema +2   LLE Edema +2   Physician Notified Yes   Time Off 1103   Patient Disposition Return to room

## 2023-05-09 NOTE — ACP (ADVANCE CARE PLANNING)
Advance Care Planning   Healthcare Decision Maker:    Primary Decision Maker: Sonia Mackay Child - [phone]    Secondary Decision Maker: Anel Riveras - Child - [phone]      Today we documented Decision Maker(s) consistent with Legal Next of Kin hierarchy.

## 2023-05-09 NOTE — PROGRESS NOTES
Physical Therapy  Facility/Department: [facility]  Physical Therapy Initial Assessment    Name: Lorenza Coburn  : 1955  MRN: [id number]  Date of Service: 2023    Patient Diagnosis(es): The primary encounter diagnosis was Hyperkalemia. A diagnosis of Chest pain, unspecified type was also pertinent to this visit. Past Medical History:  has a past medical history of Acute on chronic heart failure with preserved ejection fraction ([address] Acute pulmonary embolism ([address] Anemia due to stage 5 chronic kidney disease, not on chronic dialysis ([address] Anemia in CKD (chronic kidney disease), Anxiety and depression, Aortic stenosis, mild, At high risk for falls, Brain aneurysm, CLABSI (central line-associated bloodstream infection), Cough, Diabetes mellitus ([address] Diabetes mellitus type 2 with complications (HCC), Dizziness on standing, End-stage renal disease on hemodialysis ([address] ESRD (end stage renal disease) ([address] ESRD on hemodialysis ([address] Essential hypertension, Fever, unspecified, Gastrointestinal hemorrhage, H/O heart artery stent, History of Clostridioides difficile colitis, History of pulmonary embolus (PE), Hyperlipidemia, Hypertension, Leg swelling, Lymphedema of both lower extremities, Mild pulmonary hypertension ([address] MSSA bacteremia, Nausea & vomiting, Obesity, Class III, BMI 40-49.9 (morbid obesity) ([address] Periumbilical abdominal pain, and S/P insertion of inferior vena caval filter. Past Surgical History:  has a past surgical history that includes Cardiac surgery;  section; vascular surgery (N/A, 2021); Upper gastrointestinal endoscopy (N/A, 2021); Dialysis fistula creation (Left, 2022); Colonoscopy (); Cardiac catheterization (); Percutaneous Transluminal Coronary Angio (); Upper gastrointestinal endoscopy (N/A, 2023); IVC filter insertion (2023); and Upper gastrointestinal endoscopy (N/A, 2/10/2023).       Referring provider:  Ugo Espinal

## 2023-05-09 NOTE — CONSULTS
drugs. Allergies:  Pcn [penicillins], Benzonatate, Morphine, and Sodium hypochlorite    Current Medications:    acetaminophen (TYLENOL) tablet 1,000 mg, Q8H PRN  amLODIPine (NORVASC) tablet 2.5 mg, Daily  apixaban (ELIQUIS) tablet 5 mg, BID  atorvastatin (LIPITOR) tablet 10 mg, Daily  calcium acetate (PHOSLO) capsule 667 mg, TID WC  insulin lispro (HUMALOG) injection vial 10 Units, TID AC  midodrine (PROAMATINE) tablet 10 mg, Daily PRN  pantoprazole (PROTONIX) tablet 40 mg, BID AC  sucralfate (CARAFATE) tablet 1 g, 4x Daily  docusate sodium (COLACE) capsule 100 mg, BID  insulin lispro (HUMALOG) injection vial 0-4 Units, TID WC  insulin lispro (HUMALOG) injection vial 0-4 Units, Nightly  glucose chewable tablet 16 g, PRN  dextrose bolus 10% 125 mL, PRN   Or  dextrose bolus 10% 250 mL, PRN  glucagon injection 1 mg, PRN  dextrose 10 % infusion, Continuous PRN  ondansetron (ZOFRAN) injection 4 mg, Q6H PRN        Review of Systems:   A comprehensive review of systems was negative. Physical exam:   BP (!) 134/57   Pulse 77   Temp 98 °F (36.7 °C) (Oral)   Resp 17   Ht 5' 4\" (1.626 m)   Wt 257 lb 15 oz (117 kg)   SpO2 94%   BMI 44.27 kg/m²   Morbidly obese age-appropriate white woman in no apparent distress  NC/AT EOMI sclera conjunctiva are clear and anicteric mucous membranes are moist  Neck soft supple trachea midline no bruit  CTA bilaterally though decreased AE bilaterally at the bases, as well.   Regular rhythm normal S1 and S2 (note: Not irregular)  Abdomen soft nontender nondistended normal active bowel sounds  Distal extremities reveal chronic gas type edema  Skin changes consistent with chronic venous stasis  Moves all 4 extremities  Cranial nerves II through XII grossly intact  Awake, alert, interactive and appropriate    Data:   Labs:  CBC with Differential:    Lab Results   Component Value Date/Time    WBC 9.7 05/09/2023 02:45 AM    RBC 3.44 05/09/2023 02:45 AM    HGB 10.6 05/09/2023 02:45 AM    HCT
rhythm with ventricular ectopy and periods of sinus tachycardia as well. There is no demonstrable atrial fibrillation present. Once again she should maintain her LDL cholesterol at < 55 mg/dL in accordance with updated 2020 ACC/AHA/AACE/ESC/EAS cholesterol guidelines. continue to maintain her volume status with dialysis and carefully monitor electrolytes as well. Obviously with her multisystem disease and defined heart disease she is at risk for atrial fibrillation and if and when this is documented we will treat accordingly. I will follow her during this hospitalization and attempt to make appropriate changes to her benefit. I have spent more than 45 minutes face to face with Nicole Lockhart reviewing notes and laboratory data with greater than 50% of this time instructing and counseling the patient regarding my findings and recommendations and I have answered all questions as posed to me by Ms. Murray. Thank you, Andreea Benavidez MD for allowing me to consult in the care of this patient. Meaghan Gerber DO , FACP, [facility] [place], [facility]    NOTE:  This report was transcribed using voice recognition software. Every effort was made to ensure accuracy; however, inadvertent computerized transcription errors may be present.

## 2023-05-10 ENCOUNTER — TELEPHONE (OUTPATIENT)
Dept: PRIMARY CARE CLINIC | Age: 68
End: 2023-05-10

## 2023-05-10 NOTE — DISCHARGE SUMMARY
DISCHARGE SUMMARY  Patient ID:  Alberto Thompson  [id number]  80 y.o.  1955    Admit date: 5/8/2023      Discharge date : 5/9/2023      Admission Diagnoses: Hyperkalemia [E87.5]    Discharge Diagnosis  Principal Problem:    Hyperkalemia  Active Problems:    H/O heart artery stent    Diabetes mellitus (HCC)    End-stage renal disease on hemodialysis (HCC)    Lymphedema of both lower extremities    Acute on chronic heart failure with preserved ejection fraction (HCC)    Aortic stenosis, mild    Anemia due to stage 5 chronic kidney disease ([facility]    History of pulmonary embolus (PE)    Essential hypertension    Hyperlipidemia    Diabetes mellitus type 2 with complications (HCC)    Mild pulmonary hypertension (HCC)    Obesity, Class III, BMI 40-49.9 (morbid obesity) ([facility]  Resolved Problems:    * No resolved hospital problems. *      Hospital Course:    CHIEF COMPLAINT: Fatigue, tachycardia     History of Present Illness: 81-year-old female patient of Dr. Theresa Gomez I am asked to admit and follow. History obtained from patient as well as extensive review of electronic record. Patient had her last physical therapy session at home, home health care. Therapist took vitals and noticed significant irregularity, possible atrial fibrillation. Patient called Dr. Dorado Spikes office could not get through. She then contacted NP with nephrology and requested advice. She was told to report to the ED. Patient uncertain if she continues to feel irregularities. However she states she does have frequent PVCs. Patient also experienced right chest discomfort as well as right arm discomfort. No associated cardiac symptoms. In the ED temperature was 98.3 heart rate 88 respirations 20 blood pressure 134/66. SPO2 is 99 on 2 L nasal cannula. Chest x-ray revealed mild cardiomegaly and mild CHF. Also mild right basilar subsegmental atelectasis. Patient is on chronic oxygen at home 2 L/min.   --EKG done in the ED showed normal sinus

## 2023-05-10 NOTE — TELEPHONE ENCOUNTER
Care Transitions Initial Follow Up Call    Outreach made within 2 business days of discharge: Yes    Patient: Albert Carvalho Patient : 1955   MRN: [id number]  Reason for Admission: There are no discharge diagnoses documented for the most recent discharge. Discharge Date: 23       Spoke with: Patient     Discharge department/facility: [address] [place] [address]    TCM Interactive Patient Contact:  Was patient able to fill all prescriptions: Yes  Was patient instructed to bring all medications to the follow-up visit: Yes  Is patient taking all medications as directed in the discharge summary?  Yes  Does patient understand their discharge instructions: Yes  Does patient have questions or concerns that need addressed prior to 7-14 day follow up office visit: no    Scheduled appointment with PCP within 7-14 days    Follow Up  Future Appointments   Date Time Provider Surinder Pastrana   2023  8:30 Michelle Whitehead MD [facility]   2023  8:45 AM Steven Watts MD [facility]   4/10/2024  8:30 AM Vahe Gil MD [facility]       Jessica Mcgovern, [facility]

## 2023-05-14 LAB
BACTERIA BLD CULT ORG #2: NORMAL
BACTERIA BLD CULT: NORMAL

## 2023-05-15 ENCOUNTER — OFFICE VISIT (OUTPATIENT)
Dept: PRIMARY CARE CLINIC | Age: 68
End: 2023-05-15

## 2023-05-15 VITALS
HEART RATE: 79 BPM | SYSTOLIC BLOOD PRESSURE: 118 MMHG | TEMPERATURE: 97.3 F | BODY MASS INDEX: 43.94 KG/M2 | OXYGEN SATURATION: 91 % | WEIGHT: 256 LBS | DIASTOLIC BLOOD PRESSURE: 74 MMHG

## 2023-05-15 DIAGNOSIS — I10 PRIMARY HYPERTENSION: ICD-10-CM

## 2023-05-15 DIAGNOSIS — E78.2 MIXED HYPERLIPIDEMIA: ICD-10-CM

## 2023-05-15 DIAGNOSIS — E11.65 UNCONTROLLED TYPE 2 DIABETES MELLITUS WITH HYPERGLYCEMIA (HCC): ICD-10-CM

## 2023-05-15 DIAGNOSIS — Z99.2 ESRD ON HEMODIALYSIS (HCC): ICD-10-CM

## 2023-05-15 DIAGNOSIS — E11.8 DIABETES MELLITUS TYPE 2 WITH COMPLICATIONS (HCC): ICD-10-CM

## 2023-05-15 DIAGNOSIS — N18.6 ESRD ON HEMODIALYSIS (HCC): ICD-10-CM

## 2023-05-15 DIAGNOSIS — E87.5 HYPERKALEMIA: Primary | ICD-10-CM

## 2023-05-15 DIAGNOSIS — Z09 HOSPITAL DISCHARGE FOLLOW-UP: ICD-10-CM

## 2023-05-15 RX ORDER — INSULIN LISPRO 100 [IU]/ML
15 INJECTION, SOLUTION INTRAVENOUS; SUBCUTANEOUS
Qty: 4 ADJUSTABLE DOSE PRE-FILLED PEN SYRINGE | Refills: 3 | Status: SHIPPED | OUTPATIENT
Start: 2023-05-15

## 2023-05-15 RX ORDER — BLOOD-GLUCOSE SENSOR
1 EACH MISCELLANEOUS
Qty: 3 EACH | Refills: 5 | Status: SHIPPED | OUTPATIENT
Start: 2023-05-15

## 2023-05-15 NOTE — PROGRESS NOTES
Post-Discharge Transitional Care  Follow Up      Jefe Bai   YOB: 1955    Date of Office Visit:  5/15/2023  Date of Hospital Admission: 5/8/23  Date of Hospital Discharge: 5/9/23  Risk of hospital readmission (high >=14%. Medium >=10%) :Readmission Risk Score: 23.3      Care management risk score Rising risk (score 2-5) and Complex Care (Scores >=6): No Risk Score On File     Non face to face  following discharge, date last encounter closed (first attempt may have been earlier): 05/10/2023    Call initiated 2 business days of discharge: Yes    ASSESSMENT/PLAN:   Hyperkalemia  Comments:  hospitalized for it,she feels fine today ,nephrology thought it could be lab error  Diabetes mellitus type 2 with complications ([facility]  -     Continuous Blood Gluc Sensor (FREESTYLE LUCIEN 3 SENSOR) MISC; 1 Device by Does not apply route every 14 days, Disp-3 each, R-5Print  Mixed hyperlipidemia  Comments:  LDL 72 ,goal is < 70 cont. atorvastatin same dose  Primary hypertension  Comments:  BP at goal ,cont. amlodipine  ESRD on hemodialysis ([address]  Uncontrolled type 2 diabetes mellitus with hyperglycemia ([facility]  -     Suzette Vincent DO, Endocrinology, [place] ([place])  -     Continuous Blood Gluc Sensor (FREESTYLE LUCIEN 3 SENSOR) MISC; 1 Device by Does not apply route every 14 days, Disp-3 each, R-5Print  Hospital discharge follow-up  -     [place] DISCHARGE MEDS RECONCILED W/ CURRENT OUTPATIENT MED LIST  Diabetes mellitus type 2 with complications ([facility]  Comments:  S.S. of insulin humalogue + 5 unit TID ,A1c unacceptable ,free Style Lucien ordered ,pt. has a smart phone,will bring readings,she is reluctant to FU with endo. Orders:  -     Continuous Blood Gluc Sensor (FREESTYLE LUCIEN 3 SENSOR) MISC; 1 Device by Does not apply route every 14 days, Disp-3 each, R-5Print      Medical Decision Making: moderate complexity  Return in 1 month (on 6/15/2023).            Subjective:   HPI:  Follow up of BRITTANY RODRIGUEZ

## 2023-05-16 ENCOUNTER — NURSE ONLY (OUTPATIENT)
Dept: PRIMARY CARE CLINIC | Age: 68
End: 2023-05-16
Payer: MEDICARE

## 2023-05-16 DIAGNOSIS — Z20.822 ENCOUNTER FOR LABORATORY TESTING FOR COVID-19 VIRUS: Primary | ICD-10-CM

## 2023-05-16 LAB
Lab: NORMAL
PERFORMING INSTRUMENT: NORMAL
QC PASS/FAIL: NORMAL
SARS-COV-2, POC: NORMAL

## 2023-05-16 PROCEDURE — 87426 SARSCOV CORONAVIRUS AG IA: CPT | Performed by: INTERNAL MEDICINE

## 2023-05-25 ENCOUNTER — OFFICE VISIT (OUTPATIENT)
Dept: PRIMARY CARE CLINIC | Age: 68
End: 2023-05-25
Payer: MEDICARE

## 2023-05-25 VITALS
OXYGEN SATURATION: 91 % | HEART RATE: 106 BPM | SYSTOLIC BLOOD PRESSURE: 134 MMHG | WEIGHT: 254 LBS | DIASTOLIC BLOOD PRESSURE: 78 MMHG | TEMPERATURE: 97.3 F | BODY MASS INDEX: 43.36 KG/M2 | HEIGHT: 64 IN

## 2023-05-25 DIAGNOSIS — R22.42 LOCALIZED SWELLING OF LEFT LOWER LEG: ICD-10-CM

## 2023-05-25 DIAGNOSIS — M79.662 PAIN OF LEFT CALF: Primary | ICD-10-CM

## 2023-05-25 DIAGNOSIS — Z99.2 ESRD ON HEMODIALYSIS (HCC): ICD-10-CM

## 2023-05-25 DIAGNOSIS — L03.119 CELLULITIS AND ABSCESS OF LEG: ICD-10-CM

## 2023-05-25 DIAGNOSIS — N18.6 ESRD ON HEMODIALYSIS (HCC): ICD-10-CM

## 2023-05-25 DIAGNOSIS — Z79.01 CHRONIC ANTICOAGULATION: ICD-10-CM

## 2023-05-25 DIAGNOSIS — L02.419 CELLULITIS AND ABSCESS OF LEG: ICD-10-CM

## 2023-05-25 DIAGNOSIS — I89.0 LYMPHEDEMA OF BOTH LOWER EXTREMITIES: ICD-10-CM

## 2023-05-25 DIAGNOSIS — E78.2 MIXED HYPERLIPIDEMIA: ICD-10-CM

## 2023-05-25 PROCEDURE — G8427 DOCREV CUR MEDS BY ELIG CLIN: HCPCS | Performed by: INTERNAL MEDICINE

## 2023-05-25 PROCEDURE — G8400 PT W/DXA NO RESULTS DOC: HCPCS | Performed by: INTERNAL MEDICINE

## 2023-05-25 PROCEDURE — 3078F DIAST BP <80 MM HG: CPT | Performed by: INTERNAL MEDICINE

## 2023-05-25 PROCEDURE — 1036F TOBACCO NON-USER: CPT | Performed by: INTERNAL MEDICINE

## 2023-05-25 PROCEDURE — 3017F COLORECTAL CA SCREEN DOC REV: CPT | Performed by: INTERNAL MEDICINE

## 2023-05-25 PROCEDURE — 1111F DSCHRG MED/CURRENT MED MERGE: CPT | Performed by: INTERNAL MEDICINE

## 2023-05-25 PROCEDURE — 99213 OFFICE O/P EST LOW 20 MIN: CPT | Performed by: INTERNAL MEDICINE

## 2023-05-25 PROCEDURE — 3075F SYST BP GE 130 - 139MM HG: CPT | Performed by: INTERNAL MEDICINE

## 2023-05-25 PROCEDURE — G8417 CALC BMI ABV UP PARAM F/U: HCPCS | Performed by: INTERNAL MEDICINE

## 2023-05-25 PROCEDURE — 1123F ACP DISCUSS/DSCN MKR DOCD: CPT | Performed by: INTERNAL MEDICINE

## 2023-05-25 PROCEDURE — 1090F PRES/ABSN URINE INCON ASSESS: CPT | Performed by: INTERNAL MEDICINE

## 2023-05-25 RX ORDER — ATORVASTATIN CALCIUM 40 MG/1
40 TABLET, FILM COATED ORAL DAILY
Qty: 90 TABLET | Refills: 3 | Status: SHIPPED | OUTPATIENT
Start: 2023-05-25

## 2023-05-25 RX ORDER — CEPHALEXIN 500 MG/1
500 CAPSULE ORAL 4 TIMES DAILY
Qty: 28 CAPSULE | Refills: 0 | Status: SHIPPED | OUTPATIENT
Start: 2023-05-25 | End: 2023-06-01

## 2023-05-25 ASSESSMENT — ENCOUNTER SYMPTOMS
VOMITING: 0
NAUSEA: 0
SHORTNESS OF BREATH: 0
ABDOMINAL PAIN: 0
CHEST TIGHTNESS: 0
VOICE CHANGE: 0
SORE THROAT: 0
WHEEZING: 0

## 2023-05-25 NOTE — PROGRESS NOTES
HPI:  Patient comes in today for pain and swelling left leg. The patient states she went on a trip to [place] she was stopping every hour and a half to walk around, she is compliant with taking her Eliquis 5 mg twice daily patient states when she was getting into the car she smashed her left leg on the border of the car stepper, it has been swollen and very tender. Patient on chronic dialysis 3 times weekly. Review of Systems   Constitutional:  Negative for chills, fever and unexpected weight change. HENT:  Negative for postnasal drip, sore throat and voice change. Respiratory:  Negative for chest tightness, shortness of breath and wheezing. Cardiovascular:  Positive for leg swelling. Negative for chest pain. Gastrointestinal:  Negative for abdominal pain, nausea and vomiting. Musculoskeletal:  Negative for gait problem and joint swelling. Skin:  Negative for rash and wound. Neurological: Negative. Psychiatric/Behavioral: Negative.         Past Medical History:   Diagnosis Date    Acute on chronic heart failure with preserved ejection fraction ([address] 02/07/2023    Acute pulmonary embolism ([address] 12/03/2022    Anemia due to stage 5 chronic kidney disease, not on chronic dialysis ([address] 02/08/2023    Anemia in CKD (chronic kidney disease) 11/30/2021    Anxiety and depression 01/19/2022    Aortic stenosis, mild 02/08/2023 12/2022    At high risk for falls 01/19/2022    Brain aneurysm     CLABSI (central line-associated bloodstream infection) 11/19/2021    Cough 01/19/2022    Diabetes mellitus ([address] 2006    Diabetes mellitus type 2 with complications ([address] 06/64/6605    Dizziness on standing 01/19/2022    End-stage renal disease on hemodialysis ([address] 01/19/2023    ESRD (end stage renal disease) ([address] 11/19/2021    ESRD on hemodialysis ([address] 11/19/2021    Essential hypertension 11/30/2021    Fever, unspecified     Gastrointestinal hemorrhage 11/30/2021    H/O heart artery stent 2015    Done in

## 2023-06-15 ENCOUNTER — APPOINTMENT (OUTPATIENT)
Dept: GENERAL RADIOLOGY | Age: 68
End: 2023-06-15
Payer: MEDICARE

## 2023-06-15 ENCOUNTER — HOSPITAL ENCOUNTER (EMERGENCY)
Age: 68
Discharge: HOME | End: 2023-06-15
Attending: EMERGENCY MEDICINE
Payer: MEDICARE

## 2023-06-15 ENCOUNTER — APPOINTMENT (OUTPATIENT)
Dept: CT IMAGING | Age: 68
End: 2023-06-15
Payer: MEDICARE

## 2023-06-15 VITALS
RESPIRATION RATE: 18 BRPM | HEART RATE: 96 BPM | DIASTOLIC BLOOD PRESSURE: 90 MMHG | OXYGEN SATURATION: 100 % | BODY MASS INDEX: 42.68 KG/M2 | SYSTOLIC BLOOD PRESSURE: 127 MMHG | TEMPERATURE: 97.9 F | WEIGHT: 250 LBS | HEIGHT: 64 IN

## 2023-06-15 DIAGNOSIS — M25.519 SHOULDER PAIN, UNSPECIFIED CHRONICITY, UNSPECIFIED LATERALITY: Primary | ICD-10-CM

## 2023-06-15 DIAGNOSIS — R07.89 RIGHT-SIDED CHEST WALL PAIN: ICD-10-CM

## 2023-06-15 DIAGNOSIS — N17.9 ACUTE RENAL FAILURE, UNSPECIFIED ACUTE RENAL FAILURE TYPE (HCC): ICD-10-CM

## 2023-06-15 LAB
ANION GAP SERPL CALCULATED.3IONS-SCNC: 16 MMOL/L (ref 7–16)
BASOPHILS # BLD: 0.04 E9/L (ref 0–0.2)
BASOPHILS NFR BLD: 0.5 % (ref 0–2)
BNP BLD-MCNC: 8924 PG/ML (ref 0–125)
BUN SERPL-MCNC: 65 MG/DL (ref 6–23)
CALCIUM SERPL-MCNC: 8.5 MG/DL (ref 8.6–10.2)
CHLORIDE SERPL-SCNC: 95 MMOL/L (ref 98–107)
CO2 SERPL-SCNC: 28 MMOL/L (ref 22–29)
CREAT SERPL-MCNC: 7.2 MG/DL (ref 0.5–1)
EOSINOPHIL # BLD: 0.49 E9/L (ref 0.05–0.5)
EOSINOPHIL NFR BLD: 6.3 % (ref 0–6)
ERYTHROCYTE [DISTWIDTH] IN BLOOD BY AUTOMATED COUNT: 14.4 FL (ref 11.5–15)
GLUCOSE SERPL-MCNC: 310 MG/DL (ref 74–99)
HCT VFR BLD AUTO: 32.3 % (ref 34–48)
HGB BLD-MCNC: 10.5 G/DL (ref 11.5–15.5)
IMM GRANULOCYTES # BLD: 0.03 E9/L
IMM GRANULOCYTES NFR BLD: 0.4 % (ref 0–5)
LYMPHOCYTES # BLD: 1.21 E9/L (ref 1.5–4)
LYMPHOCYTES NFR BLD: 15.6 % (ref 20–42)
MCH RBC QN AUTO: 32.2 PG (ref 26–35)
MCHC RBC AUTO-ENTMCNC: 32.5 % (ref 32–34.5)
MCV RBC AUTO: 99.1 FL (ref 80–99.9)
MONOCYTES # BLD: 0.66 E9/L (ref 0.1–0.95)
MONOCYTES NFR BLD: 8.5 % (ref 2–12)
NEUTROPHILS # BLD: 5.33 E9/L (ref 1.8–7.3)
NEUTS SEG NFR BLD: 68.7 % (ref 43–80)
PLATELET # BLD AUTO: 133 E9/L (ref 130–450)
PMV BLD AUTO: 10.8 FL (ref 7–12)
POTASSIUM SERPL-SCNC: 5.2 MMOL/L (ref 3.5–5)
RBC # BLD AUTO: 3.26 E12/L (ref 3.5–5.5)
SODIUM SERPL-SCNC: 139 MMOL/L (ref 132–146)
TROPONIN, HIGH SENSITIVITY: 55 NG/L (ref 0–9)
TROPONIN, HIGH SENSITIVITY: 58 NG/L (ref 0–9)
WBC # BLD: 7.8 E9/L (ref 4.5–11.5)

## 2023-06-15 PROCEDURE — 99285 EMERGENCY DEPT VISIT HI MDM: CPT

## 2023-06-15 PROCEDURE — 71275 CT ANGIOGRAPHY CHEST: CPT

## 2023-06-15 PROCEDURE — 83880 ASSAY OF NATRIURETIC PEPTIDE: CPT

## 2023-06-15 PROCEDURE — 6360000002 HC RX W HCPCS: Performed by: EMERGENCY MEDICINE

## 2023-06-15 PROCEDURE — 84484 ASSAY OF TROPONIN QUANT: CPT

## 2023-06-15 PROCEDURE — 93005 ELECTROCARDIOGRAM TRACING: CPT | Performed by: EMERGENCY MEDICINE

## 2023-06-15 PROCEDURE — 85025 COMPLETE CBC W/AUTO DIFF WBC: CPT

## 2023-06-15 PROCEDURE — 90935 HEMODIALYSIS ONE EVALUATION: CPT | Performed by: INTERNAL MEDICINE

## 2023-06-15 PROCEDURE — 96374 THER/PROPH/DIAG INJ IV PUSH: CPT

## 2023-06-15 PROCEDURE — 6360000004 HC RX CONTRAST MEDICATION: Performed by: RADIOLOGY

## 2023-06-15 PROCEDURE — 80048 BASIC METABOLIC PNL TOTAL CA: CPT

## 2023-06-15 PROCEDURE — 71045 X-RAY EXAM CHEST 1 VIEW: CPT

## 2023-06-15 RX ORDER — KETOROLAC TROMETHAMINE 30 MG/ML
15 INJECTION, SOLUTION INTRAMUSCULAR; INTRAVENOUS ONCE
Status: COMPLETED | OUTPATIENT
Start: 2023-06-15 | End: 2023-06-15

## 2023-06-15 RX ADMIN — IOPAMIDOL 75 ML: 755 INJECTION, SOLUTION INTRAVENOUS at 08:21

## 2023-06-15 RX ADMIN — KETOROLAC TROMETHAMINE 15 MG: 30 INJECTION, SOLUTION INTRAMUSCULAR; INTRAVENOUS at 06:46

## 2023-06-15 ASSESSMENT — PAIN DESCRIPTION - ORIENTATION
ORIENTATION: RIGHT
ORIENTATION: RIGHT

## 2023-06-15 ASSESSMENT — PAIN SCALES - GENERAL
PAINLEVEL_OUTOF10: 6
PAINLEVEL_OUTOF10: 4

## 2023-06-15 ASSESSMENT — ENCOUNTER SYMPTOMS
EYE REDNESS: 0
SINUS PRESSURE: 0
EYE PAIN: 0
TROUBLE SWALLOWING: 0
VOMITING: 0
EYE DISCHARGE: 0
SORE THROAT: 0
SHORTNESS OF BREATH: 1
BACK PAIN: 0
COUGH: 0
ABDOMINAL PAIN: 0
ABDOMINAL DISTENTION: 0
DIARRHEA: 0
WHEEZING: 0
NAUSEA: 0

## 2023-06-15 ASSESSMENT — PAIN DESCRIPTION - PAIN TYPE: TYPE: ACUTE PAIN

## 2023-06-15 ASSESSMENT — PAIN DESCRIPTION - LOCATION
LOCATION: SHOULDER
LOCATION: SHOULDER;CHEST;NECK

## 2023-06-15 NOTE — FLOWSHEET NOTE
06/15/23 1730   Vital Signs   BP (!) 187/87   Temp   (thermometer broken)   Pulse 65   Respirations 20   Post-Hemodialysis Assessment   Post-Treatment Procedures Access bleeding time < 10 minutes   Machine Disinfection Process Acid/Vinegar Clean;Heat Disinfect; Exterior Machine Disinfection   Rinseback Volume (ml) 300 ml   Blood Volume Processed (Liters) 84.1 l/min   Dialyzer Clearance Lightly streaked   Duration of Treatment (minutes) 240 minutes   Heparin Amount Administered During Treatment (mL) 0 mL   Hemodialysis Intake (ml) 500 ml   Hemodialysis Output (ml) 2800 ml   NET Removed (ml) 2300   Tolerated Treatment Good   Patient Response to Treatment rinse back given. 4hr dialysis completed. all blood returned to pt. lines disconnected. needles pulled. pressure held to sites. 2x2's and tape applied after bleeding stopped. report called to E.R   Bilateral Breath Sounds Clear;Diminished   Edema Generalized;Right lower extremity; Left lower extremity   Edema Generalized +2   RLE Edema +2   LLE Edema +2   Physician Notified No   Time Off 1147   Patient Disposition Remain in ICU/ED

## 2023-06-15 NOTE — ED PROVIDER NOTES
60-year-old female presents to the emergency department with right-sided chest pain right arm pain and pain rating into her right back. Patient states a previous history of pulmonary embolism and has been on Eliquis for this she is also a dialysis patient is currently missing dialysis to be in the emergency department today. Follows with Dr. Alex Darling. She reports the symptoms are similar to when she was previously diagnosed with pulmonary embolism. She states no fevers chills nausea vomiting diarrhea abdominal pain urinary symptoms leg swelling or other complaints at this time. The history is provided by the patient. Chest Pain  Pain location:  R chest and R lateral chest  Pain quality: aching and sharp    Pain radiates to:  Upper back and R arm  Pain severity:  Moderate  Onset quality:  Gradual  Duration:  12 hours  Timing:  Intermittent  Progression:  Waxing and waning  Relieved by:  Nothing  Worsened by:  Nothing  Ineffective treatments:  None tried  Associated symptoms: shortness of breath    Associated symptoms: no abdominal pain, no anxiety, no back pain, no claudication, no cough, no dysphagia, no fatigue, no fever, no headache, no nausea, no vomiting and no weakness    Risk factors: prior DVT/PE       Review of Systems   Constitutional:  Negative for chills, fatigue and fever. HENT:  Negative for ear pain, sinus pressure, sore throat and trouble swallowing. Eyes:  Negative for pain, discharge and redness. Respiratory:  Positive for shortness of breath. Negative for cough and wheezing. Cardiovascular:  Positive for chest pain. Negative for claudication. Gastrointestinal:  Negative for abdominal distention, abdominal pain, diarrhea, nausea and vomiting. Genitourinary:  Negative for dysuria and frequency. Musculoskeletal:  Negative for arthralgias and back pain. Skin:  Negative for rash and wound. Neurological:  Negative for weakness and headaches.    Hematological:  Negative for

## 2023-06-15 NOTE — CARE COORDINATION
Social Work / Discharge Planning : SW noted consult set up HD outpatient. Patient is established at [address]. SW called and they verified. Her days are Tues, Thursday and  Sat at 5:40 am. Patient missed her treatment today therefore will need HD in ER. ER nurse updated by VISH on initial call this am. SW to follow.  Electronically signed by HILDA Ambriz on 6/15/23 at 11:20 AM EDT

## 2023-06-15 NOTE — CONSULTS
rales, rhonchi, or wheezing. Heart:   Regular rate and rhythm without murmur, gallop or rub. Abdomen:  Soft, non-tender, normal bowel sounds. No bruits, organomegaly or masses. Extremities: 2+ bilateral lower extremity edema. Right arm AVF  Musculoskeletal:  No joint swelling, deformity, or tenderness. Peripheral Pulses:  Normal  Neurologic:  Gait normal. Reflexes normal and symmetric. Sensation grossly intact.         Data:   Labs:  CBC with Differential:    Lab Results   Component Value Date/Time    WBC 7.8 06/15/2023 06:29 AM    RBC 3.26 06/15/2023 06:29 AM    HGB 10.5 06/15/2023 06:29 AM    HCT 32.3 06/15/2023 06:29 AM     06/15/2023 06:29 AM    MCV 99.1 06/15/2023 06:29 AM    MCH 32.2 06/15/2023 06:29 AM    MCHC 32.5 06/15/2023 06:29 AM    RDW 14.4 06/15/2023 06:29 AM    NRBC 0.0 01/13/2022 04:38 AM    LYMPHOPCT 15.6 06/15/2023 06:29 AM    MONOPCT 8.5 06/15/2023 06:29 AM    BASOPCT 0.5 06/15/2023 06:29 AM    MONOSABS 0.66 06/15/2023 06:29 AM    LYMPHSABS 1.21 06/15/2023 06:29 AM    EOSABS 0.49 06/15/2023 06:29 AM    BASOSABS 0.04 06/15/2023 06:29 AM     CMP:    Lab Results   Component Value Date/Time     06/15/2023 06:29 AM    K 5.2 06/15/2023 06:29 AM    K 4.6 05/02/2023 11:05 AM    CL 95 06/15/2023 06:29 AM    CO2 28 06/15/2023 06:29 AM    BUN 65 06/15/2023 06:29 AM    CREATININE 7.2 06/15/2023 06:29 AM    GFRAA 8 09/22/2022 12:14 PM    LABGLOM 6 06/15/2023 06:29 AM    GLUCOSE 310 06/15/2023 06:29 AM    PROT 7.0 05/09/2023 02:45 AM    LABALBU 3.9 05/09/2023 02:45 AM    CALCIUM 8.5 06/15/2023 06:29 AM    BILITOT 0.4 05/09/2023 02:45 AM    ALKPHOS 93 05/09/2023 02:45 AM    AST 14 05/09/2023 02:45 AM    ALT 12 05/09/2023 02:45 AM     Ionized Calcium:  No results found for: IONCA  Magnesium:    Lab Results   Component Value Date/Time    MG 2.2 05/09/2023 02:45 AM     Phosphorus:    Lab Results   Component Value Date/Time    PHOS 6.2 05/09/2023 02:45 AM     U/A:    Lab Results   Component Value

## 2023-06-16 LAB
EKG ATRIAL RATE: 94 BPM
EKG P AXIS: 34 DEGREES
EKG P-R INTERVAL: 194 MS
EKG Q-T INTERVAL: 388 MS
EKG QRS DURATION: 88 MS
EKG QTC CALCULATION (BAZETT): 485 MS
EKG R AXIS: -14 DEGREES
EKG T AXIS: 47 DEGREES
EKG VENTRICULAR RATE: 94 BPM

## 2023-06-16 PROCEDURE — 93010 ELECTROCARDIOGRAM REPORT: CPT | Performed by: INTERNAL MEDICINE

## 2023-06-19 ENCOUNTER — OFFICE VISIT (OUTPATIENT)
Dept: PRIMARY CARE CLINIC | Age: 68
End: 2023-06-19

## 2023-06-19 VITALS
SYSTOLIC BLOOD PRESSURE: 136 MMHG | OXYGEN SATURATION: 92 % | HEART RATE: 67 BPM | DIASTOLIC BLOOD PRESSURE: 58 MMHG | BODY MASS INDEX: 44.05 KG/M2 | WEIGHT: 258 LBS | TEMPERATURE: 97.1 F | HEIGHT: 64 IN

## 2023-06-19 DIAGNOSIS — Z99.2 ESRD ON HEMODIALYSIS (HCC): Primary | ICD-10-CM

## 2023-06-19 DIAGNOSIS — E87.5 HYPERKALEMIA: ICD-10-CM

## 2023-06-19 DIAGNOSIS — N18.6 ESRD ON HEMODIALYSIS (HCC): Primary | ICD-10-CM

## 2023-06-19 DIAGNOSIS — D63.1 ANEMIA DUE TO STAGE 5 CHRONIC KIDNEY DISEASE (HCC): ICD-10-CM

## 2023-06-19 DIAGNOSIS — I36.1 NONRHEUMATIC TRICUSPID VALVE REGURGITATION: ICD-10-CM

## 2023-06-19 DIAGNOSIS — E13.649 UNCONTROLLED DIABETES MELLITUS OF OTHER TYPE WITH HYPOGLYCEMIA, UNSPECIFIED HYPOGLYCEMIA COMA STATUS (HCC): ICD-10-CM

## 2023-06-19 DIAGNOSIS — N18.5 ANEMIA DUE TO STAGE 5 CHRONIC KIDNEY DISEASE (HCC): ICD-10-CM

## 2023-06-19 DIAGNOSIS — E66.01 OBESITY, CLASS III, BMI 40-49.9 (MORBID OBESITY) (HCC): ICD-10-CM

## 2023-06-19 DIAGNOSIS — I27.20 MILD PULMONARY HYPERTENSION (HCC): ICD-10-CM

## 2023-06-19 DIAGNOSIS — I35.0 NONRHEUMATIC AORTIC VALVE STENOSIS: ICD-10-CM

## 2023-06-19 DIAGNOSIS — E78.2 MIXED HYPERLIPIDEMIA: ICD-10-CM

## 2023-06-19 RX ORDER — AMLODIPINE BESYLATE 2.5 MG/1
2.5 TABLET ORAL DAILY
Qty: 30 TABLET | Refills: 3 | Status: SHIPPED | OUTPATIENT
Start: 2023-06-19

## 2023-06-19 RX ORDER — INSULIN LISPRO 100 [IU]/ML
20 INJECTION, SOLUTION INTRAVENOUS; SUBCUTANEOUS
Qty: 4 ADJUSTABLE DOSE PRE-FILLED PEN SYRINGE | Refills: 3 | Status: SHIPPED | OUTPATIENT
Start: 2023-06-19

## 2023-06-19 ASSESSMENT — ENCOUNTER SYMPTOMS
CHEST TIGHTNESS: 0
WHEEZING: 0
ABDOMINAL PAIN: 0
VOMITING: 0
VOICE CHANGE: 0
SORE THROAT: 0
NAUSEA: 0
SHORTNESS OF BREATH: 1

## 2023-06-19 NOTE — PROGRESS NOTES
HPI:  Patient comes in today for follow-up after she has been to the emergency room patient states that she felt discomfort in her chest and in her shoulder similar to how she felt when she had a pulmonary embolus she went to the ER she had a CAT scan of the chest shows no evidence of embolism, there was mild CHF and mild pleural effusion. Patient received dialysis in the hospital prior to discharging her to home. .    Review of Systems   Constitutional:  Negative for chills, fever and unexpected weight change. HENT:  Negative for postnasal drip, sore throat and voice change. Respiratory:  Positive for shortness of breath. Negative for chest tightness and wheezing. Cardiovascular:  Positive for leg swelling (Lymphedema. ). Negative for chest pain. Gastrointestinal:  Negative for abdominal pain, nausea and vomiting. Abdominal wall hernia reducible. Musculoskeletal:  Negative for gait problem and joint swelling. Skin:  Positive for rash (Skin changes of venous insufficiency in both legs with scaliness and erythema no cellulitis). Negative for wound. Neurological: Negative. Psychiatric/Behavioral: Negative.         Past Medical History:   Diagnosis Date    Acute on chronic heart failure with preserved ejection fraction ([address] 02/07/2023    Acute pulmonary embolism ([address] 12/03/2022    Anemia due to stage 5 chronic kidney disease, not on chronic dialysis ([address] 02/08/2023    Anemia in CKD (chronic kidney disease) 11/30/2021    Anxiety and depression 01/19/2022    Aortic stenosis, mild 02/08/2023 12/2022    At high risk for falls 01/19/2022    Brain aneurysm     CLABSI (central line-associated bloodstream infection) 11/19/2021    Cough 01/19/2022    Diabetes mellitus ([address] 2006    Diabetes mellitus type 2 with complications ([address] 74/59/2520    Dizziness on standing 01/19/2022    End-stage renal disease on hemodialysis ([address] 01/19/2023    ESRD (end stage renal disease) ([address] 11/19/2021    ESRD on

## 2023-06-22 ENCOUNTER — TELEPHONE (OUTPATIENT)
Dept: VASCULAR SURGERY | Age: 68
End: 2023-06-22

## 2023-06-26 ENCOUNTER — OFFICE VISIT (OUTPATIENT)
Dept: VASCULAR SURGERY | Age: 68
End: 2023-06-26
Payer: MEDICARE

## 2023-06-26 VITALS — BODY MASS INDEX: 42.34 KG/M2 | WEIGHT: 248 LBS | HEIGHT: 64 IN

## 2023-06-26 DIAGNOSIS — Z95.828 S/P IVC FILTER: ICD-10-CM

## 2023-06-26 DIAGNOSIS — I26.99 ACUTE PULMONARY EMBOLISM, UNSPECIFIED PULMONARY EMBOLISM TYPE, UNSPECIFIED WHETHER ACUTE COR PULMONALE PRESENT (HCC): ICD-10-CM

## 2023-06-26 DIAGNOSIS — Z99.2 ENCOUNTER REGARDING VASCULAR ACCESS FOR DIALYSIS FOR END-STAGE RENAL DISEASE (HCC): Primary | ICD-10-CM

## 2023-06-26 DIAGNOSIS — N18.6 ENCOUNTER REGARDING VASCULAR ACCESS FOR DIALYSIS FOR END-STAGE RENAL DISEASE (HCC): Primary | ICD-10-CM

## 2023-06-26 PROCEDURE — 3017F COLORECTAL CA SCREEN DOC REV: CPT | Performed by: NURSE PRACTITIONER

## 2023-06-26 PROCEDURE — 1090F PRES/ABSN URINE INCON ASSESS: CPT | Performed by: NURSE PRACTITIONER

## 2023-06-26 PROCEDURE — G8427 DOCREV CUR MEDS BY ELIG CLIN: HCPCS | Performed by: NURSE PRACTITIONER

## 2023-06-26 PROCEDURE — 1123F ACP DISCUSS/DSCN MKR DOCD: CPT | Performed by: NURSE PRACTITIONER

## 2023-06-26 PROCEDURE — 99212 OFFICE O/P EST SF 10 MIN: CPT | Performed by: NURSE PRACTITIONER

## 2023-06-26 PROCEDURE — G8400 PT W/DXA NO RESULTS DOC: HCPCS | Performed by: NURSE PRACTITIONER

## 2023-06-26 PROCEDURE — G8417 CALC BMI ABV UP PARAM F/U: HCPCS | Performed by: NURSE PRACTITIONER

## 2023-06-26 PROCEDURE — 1036F TOBACCO NON-USER: CPT | Performed by: NURSE PRACTITIONER

## 2023-06-26 RX ORDER — ASPIRIN 81 MG/1
81 TABLET, CHEWABLE ORAL DAILY
Qty: 30 TABLET | Refills: 3 | COMMUNITY
Start: 2023-06-26

## 2023-07-13 ENCOUNTER — OFFICE VISIT (OUTPATIENT)
Dept: PRIMARY CARE CLINIC | Age: 68
End: 2023-07-13
Payer: MEDICARE

## 2023-07-13 VITALS
RESPIRATION RATE: 18 BRPM | HEART RATE: 76 BPM | WEIGHT: 258 LBS | DIASTOLIC BLOOD PRESSURE: 62 MMHG | SYSTOLIC BLOOD PRESSURE: 88 MMHG | BODY MASS INDEX: 44.05 KG/M2 | OXYGEN SATURATION: 90 % | TEMPERATURE: 97.3 F | HEIGHT: 64 IN

## 2023-07-13 DIAGNOSIS — I10 PRIMARY HYPERTENSION: ICD-10-CM

## 2023-07-13 DIAGNOSIS — I95.2 HYPOTENSION DUE TO DRUGS: ICD-10-CM

## 2023-07-13 DIAGNOSIS — E66.01 OBESITY, CLASS III, BMI 40-49.9 (MORBID OBESITY) (HCC): ICD-10-CM

## 2023-07-13 DIAGNOSIS — L02.415 CELLULITIS AND ABSCESS OF RIGHT LOWER EXTREMITY: Primary | ICD-10-CM

## 2023-07-13 DIAGNOSIS — E13.649 UNCONTROLLED DIABETES MELLITUS OF OTHER TYPE WITH HYPOGLYCEMIA, UNSPECIFIED HYPOGLYCEMIA COMA STATUS (HCC): ICD-10-CM

## 2023-07-13 DIAGNOSIS — I36.1 NONRHEUMATIC TRICUSPID VALVE REGURGITATION: ICD-10-CM

## 2023-07-13 DIAGNOSIS — E16.2 HYPOGLYCEMIA: ICD-10-CM

## 2023-07-13 DIAGNOSIS — Z99.2 ESRD ON HEMODIALYSIS (HCC): ICD-10-CM

## 2023-07-13 DIAGNOSIS — L03.115 CELLULITIS AND ABSCESS OF RIGHT LOWER EXTREMITY: Primary | ICD-10-CM

## 2023-07-13 DIAGNOSIS — N18.6 ESRD ON HEMODIALYSIS (HCC): ICD-10-CM

## 2023-07-13 PROCEDURE — 1123F ACP DISCUSS/DSCN MKR DOCD: CPT | Performed by: INTERNAL MEDICINE

## 2023-07-13 PROCEDURE — 3017F COLORECTAL CA SCREEN DOC REV: CPT | Performed by: INTERNAL MEDICINE

## 2023-07-13 PROCEDURE — 82962 GLUCOSE BLOOD TEST: CPT | Performed by: INTERNAL MEDICINE

## 2023-07-13 PROCEDURE — 1036F TOBACCO NON-USER: CPT | Performed by: INTERNAL MEDICINE

## 2023-07-13 PROCEDURE — G8427 DOCREV CUR MEDS BY ELIG CLIN: HCPCS | Performed by: INTERNAL MEDICINE

## 2023-07-13 PROCEDURE — G8400 PT W/DXA NO RESULTS DOC: HCPCS | Performed by: INTERNAL MEDICINE

## 2023-07-13 PROCEDURE — 3078F DIAST BP <80 MM HG: CPT | Performed by: INTERNAL MEDICINE

## 2023-07-13 PROCEDURE — 3046F HEMOGLOBIN A1C LEVEL >9.0%: CPT | Performed by: INTERNAL MEDICINE

## 2023-07-13 PROCEDURE — G8417 CALC BMI ABV UP PARAM F/U: HCPCS | Performed by: INTERNAL MEDICINE

## 2023-07-13 PROCEDURE — 3074F SYST BP LT 130 MM HG: CPT | Performed by: INTERNAL MEDICINE

## 2023-07-13 PROCEDURE — 2022F DILAT RTA XM EVC RTNOPTHY: CPT | Performed by: INTERNAL MEDICINE

## 2023-07-13 PROCEDURE — 99214 OFFICE O/P EST MOD 30 MIN: CPT | Performed by: INTERNAL MEDICINE

## 2023-07-13 PROCEDURE — 1090F PRES/ABSN URINE INCON ASSESS: CPT | Performed by: INTERNAL MEDICINE

## 2023-07-13 RX ORDER — BACITRACIN ZINC AND POLYMYXIN B SULFATE 500; 1000 [USP'U]/G; [USP'U]/G
OINTMENT TOPICAL
Qty: 15 G | Refills: 1
Start: 2023-07-13 | End: 2023-07-20

## 2023-07-13 RX ORDER — CEPHALEXIN 500 MG/1
500 CAPSULE ORAL 4 TIMES DAILY
Qty: 40 CAPSULE | Refills: 0 | Status: SHIPPED | OUTPATIENT
Start: 2023-07-13

## 2023-07-13 RX ORDER — INSULIN LISPRO 100 [IU]/ML
INJECTION, SOLUTION INTRAVENOUS; SUBCUTANEOUS
Qty: 4 ADJUSTABLE DOSE PRE-FILLED PEN SYRINGE | Refills: 3
Start: 2023-07-13

## 2023-07-13 ASSESSMENT — ENCOUNTER SYMPTOMS
CHEST TIGHTNESS: 0
VOMITING: 0
ROS SKIN COMMENTS: SEE HPI
SHORTNESS OF BREATH: 1
NAUSEA: 0
ABDOMINAL PAIN: 0
VOICE CHANGE: 0
WHEEZING: 0
SORE THROAT: 0

## 2023-07-13 NOTE — PROGRESS NOTES
HPI:  Patient comes in today for complaint of swelling and pain in the right leg, patient states that the car door slammed over it it blistered and today it became extremely swollen and seeping cellulitis right lower extremity. Patient came directly from dialysis due to the deterioration of her leg, she did not bring her blood sugar readings but states she has been having low blood sugars. Mostly at 9 PM before she goes to bed  Review of Systems   Constitutional:  Positive for fatigue. Negative for chills, fever and unexpected weight change. HENT:  Negative for postnasal drip, sore throat and voice change. Respiratory:  Positive for shortness of breath. Negative for chest tightness and wheezing. Cardiovascular:  Positive for leg swelling. Negative for chest pain. Gastrointestinal:  Negative for abdominal pain, nausea and vomiting. Musculoskeletal:  Negative for gait problem and joint swelling. Skin:  Positive for wound. Negative for rash. See HPI   Neurological: Negative. Psychiatric/Behavioral: Negative.         Past Medical History:   Diagnosis Date    Acute on chronic heart failure with preserved ejection fraction ([address]) 02/07/2023    Acute pulmonary embolism ([address]) 12/03/2022    Anemia due to stage 5 chronic kidney disease, not on chronic dialysis ([address]) 02/08/2023    Anemia in CKD (chronic kidney disease) 11/30/2021    Anxiety and depression 01/19/2022    Aortic stenosis, mild 02/08/2023 12/2022    At high risk for falls 01/19/2022    Brain aneurysm     CLABSI (central line-associated bloodstream infection) 11/19/2021    Cough 01/19/2022    Diabetes mellitus ([address]) 2006    Diabetes mellitus type 2 with complications ([address]) 71/03/5635    Dizziness on standing 01/19/2022    End-stage renal disease on hemodialysis ([address]) 01/19/2023    ESRD (end stage renal disease) ([address]) 11/19/2021    ESRD on hemodialysis ([address]) 11/19/2021    Essential hypertension 11/30/2021    Fever, unspecified

## 2023-07-21 ENCOUNTER — TELEMEDICINE (OUTPATIENT)
Dept: PRIMARY CARE CLINIC | Age: 68
End: 2023-07-21
Payer: MEDICARE

## 2023-07-21 ENCOUNTER — TELEPHONE (OUTPATIENT)
Dept: PRIMARY CARE CLINIC | Age: 68
End: 2023-07-21

## 2023-07-21 DIAGNOSIS — Z99.2 ESRD ON HEMODIALYSIS (HCC): ICD-10-CM

## 2023-07-21 DIAGNOSIS — N18.6 ESRD ON HEMODIALYSIS (HCC): ICD-10-CM

## 2023-07-21 DIAGNOSIS — E13.649 UNCONTROLLED DIABETES MELLITUS OF OTHER TYPE WITH HYPOGLYCEMIA, UNSPECIFIED HYPOGLYCEMIA COMA STATUS (HCC): Primary | ICD-10-CM

## 2023-07-21 DIAGNOSIS — E66.01 OBESITY, CLASS III, BMI 40-49.9 (MORBID OBESITY) (HCC): ICD-10-CM

## 2023-07-21 PROCEDURE — 2022F DILAT RTA XM EVC RTNOPTHY: CPT | Performed by: INTERNAL MEDICINE

## 2023-07-21 PROCEDURE — G8400 PT W/DXA NO RESULTS DOC: HCPCS | Performed by: INTERNAL MEDICINE

## 2023-07-21 PROCEDURE — 1123F ACP DISCUSS/DSCN MKR DOCD: CPT | Performed by: INTERNAL MEDICINE

## 2023-07-21 PROCEDURE — G8417 CALC BMI ABV UP PARAM F/U: HCPCS | Performed by: INTERNAL MEDICINE

## 2023-07-21 PROCEDURE — 3017F COLORECTAL CA SCREEN DOC REV: CPT | Performed by: INTERNAL MEDICINE

## 2023-07-21 PROCEDURE — 3046F HEMOGLOBIN A1C LEVEL >9.0%: CPT | Performed by: INTERNAL MEDICINE

## 2023-07-21 PROCEDURE — 99213 OFFICE O/P EST LOW 20 MIN: CPT | Performed by: INTERNAL MEDICINE

## 2023-07-21 PROCEDURE — 1036F TOBACCO NON-USER: CPT | Performed by: INTERNAL MEDICINE

## 2023-07-21 PROCEDURE — G8427 DOCREV CUR MEDS BY ELIG CLIN: HCPCS | Performed by: INTERNAL MEDICINE

## 2023-07-21 PROCEDURE — 1090F PRES/ABSN URINE INCON ASSESS: CPT | Performed by: INTERNAL MEDICINE

## 2023-07-21 NOTE — TELEPHONE ENCOUNTER
The below requires prior auth per pharmacy    Through Cover my meds   [phone]      insulin lispro, 1 Unit Dial, (HUMALOG KWIKPEN) 100 UNIT/ML SOPN

## 2023-07-25 NOTE — PROGRESS NOTES
Paris Martinez (:  1955) is a Established patient, presenting virtually for evaluation of the following:    Assessment & Plan   Below is the assessment and plan developed based on review of pertinent history, physical exam, labs, studies, and medications. 1. Uncontrolled diabetes mellitus of other type with hypoglycemia, unspecified hypoglycemia coma status (HCC)  Comments:  BS from home reviewed,will add lantus nightly dose  2. ESRD on hemodialysis (HCC)  3. Obesity, Class III, BMI 40-49.9 (morbid obesity) ([address])    No follow-ups on file. Subjective   HPI:The pt.  Is evaluated for adjustment of insulin due to high BS  Review of Systems       Objective   Patient-Reported Vitals  No data recorded     Physical Exam  [INSTRUCTIONS:  \"[x]\" Indicates a positive item  \"[]\" Indicates a negative item  -- DELETE ALL ITEMS NOT EXAMINED]    Constitutional: [x] Appears well-developed and well-nourished [x] No apparent distress      [] Abnormal -     Mental status: [x] Alert and awake  [x] Oriented to person/place/time [x] Able to follow commands    [] Abnormal -     Eyes:   EOM    [x]  Normal    [] Abnormal -   Sclera  [x]  Normal    [] Abnormal -          Discharge [x]  None visible   [] Abnormal -     HENT: [x] Normocephalic, atraumatic  [] Abnormal -   [x] Mouth/Throat: Mucous membranes are moist    External Ears [x] Normal  [] Abnormal -    Neck: [x] No visualized mass [] Abnormal -     Pulmonary/Chest: [x] Respiratory effort normal   [x] No visualized signs of difficulty breathing or respiratory distress        [] Abnormal -      Musculoskeletal:   [x] Normal gait with no signs of ataxia         [x] Normal range of motion of neck        [] Abnormal -     Neurological:        [x] No Facial Asymmetry (Cranial nerve 7 motor function) (limited exam due to video visit)          [x] No gaze palsy        [] Abnormal -          Skin:        [x] No significant exanthematous lesions or discoloration noted on facial skin

## 2023-07-27 NOTE — TELEPHONE ENCOUNTER
[id number] [place] Case ID: [id number] help?  Call us at [phone]  Status  Sent to [facility]

## 2023-07-31 DIAGNOSIS — E11.8 DIABETES MELLITUS TYPE 2 WITH COMPLICATIONS (HCC): ICD-10-CM

## 2023-07-31 DIAGNOSIS — E11.65 UNCONTROLLED TYPE 2 DIABETES MELLITUS WITH HYPERGLYCEMIA (HCC): ICD-10-CM

## 2023-07-31 NOTE — TELEPHONE ENCOUNTER
Patient called today , asking why refill is taking 2 weeks to fill, brought to her attention prior auth , she stated shes aware of that , pt out of medication as of today     Asking for a call back today

## 2023-07-31 NOTE — TELEPHONE ENCOUNTER
Patient also stated she needs pen needles and written 3 times a day     Pt stated refill for pens where sent On July 21st but sent in for as directed which meant once daily per patient , PT needs 3 times daily       Insulin Pen Needle ([employer] PEN NEEDLES 29G) 29G X 12MM [facility]

## 2023-08-01 RX ORDER — PEN NEEDLE, DIABETIC 29 GAUGE
1 NEEDLE, DISPOSABLE MISCELLANEOUS 3 TIMES DAILY
Qty: 300 EACH | Refills: 6 | Status: SHIPPED | OUTPATIENT
Start: 2023-08-01

## 2023-08-01 NOTE — PROGRESS NOTES
PROGRESS NOTE    Patient Presents with/Seen in Consultation For      *gi bleed  CHIEF COMPLAINT: Neck pain, dark tarry stools, fatigue, and difficulty ambulating    Subjective:     Patient states she feels good today and wants to go home. Patient denies abdominal pain, nausea, or vomiting. States she had a brown BM. EGD results discussed with patient and her daughter with all questions answered. They verbalized understanding. Review of Systems  Aside from what was mentioned in the PMH and HPI, essentially unremarkable, all others negative. Objective:     BP (!) 133/57   Pulse 66   Temp 98.6 °F (37 °C) (Oral)   Resp 18   Ht 5' 4\" (1.626 m)   Wt 252 lb 8 oz (114.5 kg)   SpO2 94%   BMI 43.34 kg/m²     General appearance: alert, awake, sitting in chair with daughter at [facility], pale, and cooperative  Eyes: conjunctiva pale, sclera anicteric. PERRL.   Lungs: clear to auscultation bilaterally  Heart: regular rate and rhythm, no murmur, 2+ pulses; 3+ BLE edema with POLO hose intact  Abdomen: soft, non-tender; bowel sounds normal; no masses,  no organomegaly  Extremities: extremities with 3+ BLE edema with POLO hose intact  Pulses: 2+ and symmetric  Skin: Skin color pale, texture, turgor normal.   Neurologic: Grossly normal    epoetin sadia-epbx (RETACRIT) injection 3,440 Units, Once per day on Mon Wed Fri  aspirin EC tablet 81 mg, Daily  clopidogrel (PLAVIX) tablet 75 mg, Daily  traMADol (ULTRAM) tablet 50 mg, Q6H PRN  atorvastatin (LIPITOR) tablet 40 mg, Daily  carvedilol (COREG) tablet 12.5 mg, BID WC  insulin lispro (HUMALOG) injection vial 3 Units, TID WC  sodium chloride flush 0.9 % injection 5-40 mL, 2 times per day  sodium chloride flush 0.9 % injection 5-40 mL, PRN  0.9 % sodium chloride infusion, PRN  ondansetron (ZOFRAN-ODT) disintegrating tablet 4 mg, Q8H PRN   Or  ondansetron (ZOFRAN) injection 4 mg, Q6H PRN  acetaminophen (TYLENOL) tablet 650 mg, Q6H PRN   Or  acetaminophen (TYLENOL) suppository 650 mg, Q6H PRN  pantoprazole (PROTONIX) injection 40 mg, Q12H   And  sodium chloride (PF) 0.9 % injection 10 mL, Q12H  ceFAZolin (ANCEF) 2000 mg in sterile water 20 mL IV syringe, See Admin Instructions  ceFAZolin (ANCEF) 3,000 mg in dextrose 5 % 100 mL IVPB, See Admin Instructions         Data Review  CBC:   Lab Results   Component Value Date    WBC 8.2 12/03/2021    RBC 2.44 12/03/2021    HGB 7.9 12/03/2021    HCT 25.2 12/03/2021    .3 12/03/2021    MCH 32.4 12/03/2021    MCHC 31.3 12/03/2021    RDW 13.7 12/03/2021     12/03/2021    MPV 10.4 12/03/2021     CMP:    Lab Results   Component Value Date     12/03/2021    K 4.1 12/03/2021    CL 97 12/03/2021    CO2 26 12/03/2021    BUN 26 12/03/2021    CREATININE 5.0 12/03/2021    GFRAA 10 12/03/2021    LABGLOM 9 12/03/2021    GLUCOSE 155 12/03/2021    PROT 7.4 12/03/2021    LABALBU 3.4 12/03/2021    CALCIUM 8.3 12/03/2021    BILITOT 0.2 12/03/2021    ALKPHOS 112 12/03/2021    AST 12 12/03/2021    ALT <5 12/03/2021     Hepatic Function Panel:    Lab Results   Component Value Date    ALKPHOS 112 12/03/2021    ALT <5 12/03/2021    AST 12 12/03/2021    PROT 7.4 12/03/2021    BILITOT 0.2 12/03/2021    LABALBU 3.4 12/03/2021     No components found for: CHLPL  No results found for: TRIG  No results found for: HDL  No results found for: LDLCALC  No results found for: LABVLDL   PT/INR:    Lab Results   Component Value Date    PROTIME 11.7 11/30/2021    INR 1.1 11/30/2021     IRON:    Lab Results   Component Value Date    IRON 59 12/01/2021     Iron Saturation:  No components found for: PERCENTFE  FERRITIN:  No results found for: FERRITIN      Assessment:     Active Problems:  · Anemia, macrocytic suspicious for GI blood loss, melanotic stool  · ESRD on HD  · Elevated alk phos, mildly elevated  · Right neck pain radiating to right shoulder, arm, and down back-defer to PCP  · MSSA bacteremia associated to CLABSI -ID following (hemodialysis catheter removed and replaced)  · Leukocytosis -ID following  · EGD 12//1/21 - GERD, Gastritis, BRANDIE negative. Duodenum with scalloping, bx done and pending to r/o sprue. · Thyroid nodule - defer      Plan:     · Sprue pending  · Discharge per PCP/others, ok from GI POV. Pt to call office for follow up appt.     Note: This report was completed utilizing computer voice recognition software. Every effort has been made to ensure accuracy, however; inadvertent computerized transcription errors may be present.      Discussed with Dr. Kamilah Welch per Dr. Dickerson Given [facility], FNP-BC 12/3/2021 1:51 PM For Dr. Katie Calvin The patient is a 41y year old Female complaining of finger pain/injury.

## 2023-08-02 NOTE — TELEPHONE ENCOUNTER
Denied on July 27  Denied. This drug is not covered on the formulary. We are denying your request because we do not show that you have tried at least 2 covered drugs that can treat your condition. You have already tried [employer]. Other covered drug(s) is/are: Fiasp subcutaneous solution pen-injector 100 unit/ml or Fiasp subcutaneous solution 100 unit/ml. We may be able to make an exception to cover this drug. Your doctor will need to send us medical records showing that you tried this drug. If you cannot take the covered drug, your doctor will need to tell us why. Note: Some covered drug(s) may have quantity limits. Please refer to the formulary for details.

## 2023-08-03 ENCOUNTER — APPOINTMENT (OUTPATIENT)
Dept: ULTRASOUND IMAGING | Age: 68
End: 2023-08-03
Payer: MEDICARE

## 2023-08-03 ENCOUNTER — APPOINTMENT (OUTPATIENT)
Dept: CT IMAGING | Age: 68
End: 2023-08-03
Payer: MEDICARE

## 2023-08-03 ENCOUNTER — TELEPHONE (OUTPATIENT)
Dept: PRIMARY CARE CLINIC | Age: 68
End: 2023-08-03

## 2023-08-03 ENCOUNTER — APPOINTMENT (OUTPATIENT)
Dept: GENERAL RADIOLOGY | Age: 68
End: 2023-08-03
Payer: MEDICARE

## 2023-08-03 ENCOUNTER — HOSPITAL ENCOUNTER (INPATIENT)
Age: 68
LOS: 1 days | Discharge: HOME HEALTH | End: 2023-08-04
Attending: EMERGENCY MEDICINE | Admitting: STUDENT IN AN ORGANIZED HEALTH CARE EDUCATION/TRAINING PROGRAM
Payer: MEDICARE

## 2023-08-03 DIAGNOSIS — Z99.2 ESRD (END STAGE RENAL DISEASE) ON DIALYSIS (HCC): ICD-10-CM

## 2023-08-03 DIAGNOSIS — J18.9 PNEUMONIA OF RIGHT LOWER LOBE DUE TO INFECTIOUS ORGANISM: ICD-10-CM

## 2023-08-03 DIAGNOSIS — K62.5 RECTAL BLEEDING: Primary | ICD-10-CM

## 2023-08-03 DIAGNOSIS — N18.6 ESRD (END STAGE RENAL DISEASE) ON DIALYSIS (HCC): ICD-10-CM

## 2023-08-03 DIAGNOSIS — K80.20 GALLSTONES: ICD-10-CM

## 2023-08-03 LAB
ABO + RH BLD: NORMAL
ALBUMIN SERPL-MCNC: 4.3 G/DL (ref 3.5–5.2)
ALP SERPL-CCNC: 106 U/L (ref 35–104)
ALT SERPL-CCNC: 14 U/L (ref 0–32)
ANION GAP SERPL CALCULATED.3IONS-SCNC: 15 MMOL/L (ref 7–16)
ARM BAND NUMBER: NORMAL
AST SERPL-CCNC: 16 U/L (ref 0–31)
BASOPHILS # BLD: 0.04 K/UL (ref 0–0.2)
BASOPHILS NFR BLD: 0 % (ref 0–2)
BILIRUB SERPL-MCNC: 0.4 MG/DL (ref 0–1.2)
BLOOD BANK SAMPLE EXPIRATION: NORMAL
BLOOD GROUP ANTIBODIES SERPL: NEGATIVE
BNP SERPL-MCNC: ABNORMAL PG/ML (ref 0–125)
BUN SERPL-MCNC: 20 MG/DL (ref 6–23)
CALCIUM SERPL-MCNC: 9.2 MG/DL (ref 8.6–10.2)
CHLORIDE SERPL-SCNC: 92 MMOL/L (ref 98–107)
CO2 SERPL-SCNC: 30 MMOL/L (ref 22–29)
CREAT SERPL-MCNC: 3.5 MG/DL (ref 0.5–1)
EKG ATRIAL RATE: 107 BPM
EKG P-R INTERVAL: 212 MS
EKG Q-T INTERVAL: 426 MS
EKG QRS DURATION: 88 MS
EKG QTC CALCULATION (BAZETT): 568 MS
EKG R AXIS: -18 DEGREES
EKG T AXIS: 55 DEGREES
EKG VENTRICULAR RATE: 107 BPM
EOSINOPHIL # BLD: 0.26 K/UL (ref 0.05–0.5)
EOSINOPHILS RELATIVE PERCENT: 2 % (ref 0–6)
ERYTHROCYTE [DISTWIDTH] IN BLOOD BY AUTOMATED COUNT: 13.9 % (ref 11.5–15)
GFR SERPL CREATININE-BSD FRML MDRD: 14 ML/MIN/1.73M2
GLUCOSE SERPL-MCNC: 220 MG/DL (ref 74–99)
HCT VFR BLD AUTO: 33.8 % (ref 34–48)
HGB BLD-MCNC: 11.2 G/DL (ref 11.5–15.5)
IMM GRANULOCYTES # BLD AUTO: 0.08 K/UL (ref 0–0.58)
IMM GRANULOCYTES NFR BLD: 1 % (ref 0–5)
LACTATE BLDV-SCNC: 1.8 MMOL/L (ref 0.5–1.9)
LIPASE SERPL-CCNC: 31 U/L (ref 13–60)
LYMPHOCYTES NFR BLD: 0.77 K/UL (ref 1.5–4)
LYMPHOCYTES RELATIVE PERCENT: 7 % (ref 20–42)
MCH RBC QN AUTO: 33 PG (ref 26–35)
MCHC RBC AUTO-ENTMCNC: 33.1 G/DL (ref 32–34.5)
MCV RBC AUTO: 99.7 FL (ref 80–99.9)
METER GLUCOSE: 237 MG/DL (ref 74–99)
MONOCYTES NFR BLD: 0.98 K/UL (ref 0.1–0.95)
MONOCYTES NFR BLD: 9 % (ref 2–12)
NEUTROPHILS NFR BLD: 82 % (ref 43–80)
NEUTS SEG NFR BLD: 9.45 K/UL (ref 1.8–7.3)
PLATELET, FLUORESCENCE: 123 K/UL (ref 130–450)
PMV BLD AUTO: 11.1 FL (ref 7–12)
POTASSIUM SERPL-SCNC: 3.8 MMOL/L (ref 3.5–5)
PROT SERPL-MCNC: 7.5 G/DL (ref 6.4–8.3)
RBC # BLD AUTO: 3.39 M/UL (ref 3.5–5.5)
SODIUM SERPL-SCNC: 137 MMOL/L (ref 132–146)
TROPONIN I SERPL HS-MCNC: 49 NG/L (ref 0–9)
TROPONIN I SERPL HS-MCNC: 51 NG/L (ref 0–9)
WBC OTHER # BLD: 11.6 K/UL (ref 4.5–11.5)

## 2023-08-03 PROCEDURE — 2500000003 HC RX 250 WO HCPCS: Performed by: EMERGENCY MEDICINE

## 2023-08-03 PROCEDURE — 83880 ASSAY OF NATRIURETIC PEPTIDE: CPT

## 2023-08-03 PROCEDURE — 93005 ELECTROCARDIOGRAM TRACING: CPT | Performed by: EMERGENCY MEDICINE

## 2023-08-03 PROCEDURE — 71275 CT ANGIOGRAPHY CHEST: CPT

## 2023-08-03 PROCEDURE — 2580000003 HC RX 258: Performed by: EMERGENCY MEDICINE

## 2023-08-03 PROCEDURE — 6360000004 HC RX CONTRAST MEDICATION: Performed by: RADIOLOGY

## 2023-08-03 PROCEDURE — 96375 TX/PRO/DX INJ NEW DRUG ADDON: CPT

## 2023-08-03 PROCEDURE — 83690 ASSAY OF LIPASE: CPT

## 2023-08-03 PROCEDURE — 93010 ELECTROCARDIOGRAM REPORT: CPT | Performed by: INTERNAL MEDICINE

## 2023-08-03 PROCEDURE — 83605 ASSAY OF LACTIC ACID: CPT

## 2023-08-03 PROCEDURE — 6360000002 HC RX W HCPCS: Performed by: EMERGENCY MEDICINE

## 2023-08-03 PROCEDURE — 74177 CT ABD & PELVIS W/CONTRAST: CPT

## 2023-08-03 PROCEDURE — 85025 COMPLETE CBC W/AUTO DIFF WBC: CPT

## 2023-08-03 PROCEDURE — 0202U NFCT DS 22 TRGT SARS-COV-2: CPT

## 2023-08-03 PROCEDURE — 86850 RBC ANTIBODY SCREEN: CPT

## 2023-08-03 PROCEDURE — 99222 1ST HOSP IP/OBS MODERATE 55: CPT | Performed by: STUDENT IN AN ORGANIZED HEALTH CARE EDUCATION/TRAINING PROGRAM

## 2023-08-03 PROCEDURE — 80053 COMPREHEN METABOLIC PANEL: CPT

## 2023-08-03 PROCEDURE — 96366 THER/PROPH/DIAG IV INF ADDON: CPT

## 2023-08-03 PROCEDURE — 76705 ECHO EXAM OF ABDOMEN: CPT

## 2023-08-03 PROCEDURE — 99285 EMERGENCY DEPT VISIT HI MDM: CPT

## 2023-08-03 PROCEDURE — 82947 ASSAY GLUCOSE BLOOD QUANT: CPT

## 2023-08-03 PROCEDURE — C9113 INJ PANTOPRAZOLE SODIUM, VIA: HCPCS | Performed by: EMERGENCY MEDICINE

## 2023-08-03 PROCEDURE — 71045 X-RAY EXAM CHEST 1 VIEW: CPT

## 2023-08-03 PROCEDURE — 2580000003 HC RX 258: Performed by: STUDENT IN AN ORGANIZED HEALTH CARE EDUCATION/TRAINING PROGRAM

## 2023-08-03 PROCEDURE — 96365 THER/PROPH/DIAG IV INF INIT: CPT

## 2023-08-03 PROCEDURE — 86900 BLOOD TYPING SEROLOGIC ABO: CPT

## 2023-08-03 PROCEDURE — 86901 BLOOD TYPING SEROLOGIC RH(D): CPT

## 2023-08-03 PROCEDURE — 84484 ASSAY OF TROPONIN QUANT: CPT

## 2023-08-03 PROCEDURE — 1200000000 HC SEMI PRIVATE

## 2023-08-03 RX ORDER — INSULIN LISPRO 100 [IU]/ML
0-4 INJECTION, SOLUTION INTRAVENOUS; SUBCUTANEOUS NIGHTLY
Status: DISCONTINUED | OUTPATIENT
Start: 2023-08-03 | End: 2023-08-04 | Stop reason: HOSPADM

## 2023-08-03 RX ORDER — SODIUM CHLORIDE 0.9 % (FLUSH) 0.9 %
5-40 SYRINGE (ML) INJECTION EVERY 12 HOURS SCHEDULED
Status: DISCONTINUED | OUTPATIENT
Start: 2023-08-03 | End: 2023-08-04 | Stop reason: HOSPADM

## 2023-08-03 RX ORDER — SODIUM CHLORIDE 9 MG/ML
INJECTION, SOLUTION INTRAVENOUS PRN
Status: DISCONTINUED | OUTPATIENT
Start: 2023-08-03 | End: 2023-08-04 | Stop reason: HOSPADM

## 2023-08-03 RX ORDER — ONDANSETRON 2 MG/ML
4 INJECTION INTRAMUSCULAR; INTRAVENOUS EVERY 6 HOURS PRN
Status: DISCONTINUED | OUTPATIENT
Start: 2023-08-03 | End: 2023-08-04

## 2023-08-03 RX ORDER — ONDANSETRON 4 MG/1
4 TABLET, ORALLY DISINTEGRATING ORAL EVERY 8 HOURS PRN
Status: DISCONTINUED | OUTPATIENT
Start: 2023-08-03 | End: 2023-08-04

## 2023-08-03 RX ORDER — PANTOPRAZOLE SODIUM 40 MG/10ML
40 INJECTION, POWDER, LYOPHILIZED, FOR SOLUTION INTRAVENOUS ONCE
Status: COMPLETED | OUTPATIENT
Start: 2023-08-03 | End: 2023-08-03

## 2023-08-03 RX ORDER — INSULIN ASPART INJECTION 100 [IU]/ML
INJECTION, SOLUTION SUBCUTANEOUS
Qty: 4 ADJUSTABLE DOSE PRE-FILLED PEN SYRINGE | Refills: 0 | Status: SHIPPED | OUTPATIENT
Start: 2023-08-03

## 2023-08-03 RX ORDER — INSULIN LISPRO 100 [IU]/ML
0-4 INJECTION, SOLUTION INTRAVENOUS; SUBCUTANEOUS
Status: DISCONTINUED | OUTPATIENT
Start: 2023-08-04 | End: 2023-08-04 | Stop reason: HOSPADM

## 2023-08-03 RX ORDER — PANTOPRAZOLE SODIUM 40 MG/1
40 TABLET, DELAYED RELEASE ORAL
Status: DISCONTINUED | OUTPATIENT
Start: 2023-08-04 | End: 2023-08-03

## 2023-08-03 RX ORDER — ACETAMINOPHEN 325 MG/1
650 TABLET ORAL EVERY 6 HOURS PRN
Status: DISCONTINUED | OUTPATIENT
Start: 2023-08-03 | End: 2023-08-04 | Stop reason: HOSPADM

## 2023-08-03 RX ORDER — FENTANYL CITRATE 50 UG/ML
25 INJECTION, SOLUTION INTRAMUSCULAR; INTRAVENOUS ONCE
Status: COMPLETED | OUTPATIENT
Start: 2023-08-03 | End: 2023-08-03

## 2023-08-03 RX ORDER — FAMOTIDINE 20 MG/1
20 TABLET, FILM COATED ORAL NIGHTLY
Status: DISCONTINUED | OUTPATIENT
Start: 2023-08-03 | End: 2023-08-04 | Stop reason: HOSPADM

## 2023-08-03 RX ORDER — CALCIUM ACETATE 667 MG/1
667 CAPSULE ORAL
Status: DISCONTINUED | OUTPATIENT
Start: 2023-08-04 | End: 2023-08-04 | Stop reason: HOSPADM

## 2023-08-03 RX ORDER — POLYETHYLENE GLYCOL 3350 17 G/17G
17 POWDER, FOR SOLUTION ORAL DAILY PRN
Status: DISCONTINUED | OUTPATIENT
Start: 2023-08-03 | End: 2023-08-04 | Stop reason: HOSPADM

## 2023-08-03 RX ORDER — MIDODRINE HYDROCHLORIDE 5 MG/1
10 TABLET ORAL DAILY PRN
Status: DISCONTINUED | OUTPATIENT
Start: 2023-08-03 | End: 2023-08-04 | Stop reason: HOSPADM

## 2023-08-03 RX ORDER — SODIUM CHLORIDE 0.9 % (FLUSH) 0.9 %
5-40 SYRINGE (ML) INJECTION PRN
Status: DISCONTINUED | OUTPATIENT
Start: 2023-08-03 | End: 2023-08-04 | Stop reason: HOSPADM

## 2023-08-03 RX ORDER — ATORVASTATIN CALCIUM 40 MG/1
40 TABLET, FILM COATED ORAL DAILY
Status: DISCONTINUED | OUTPATIENT
Start: 2023-08-04 | End: 2023-08-04 | Stop reason: HOSPADM

## 2023-08-03 RX ORDER — ACETAMINOPHEN 650 MG/1
650 SUPPOSITORY RECTAL EVERY 6 HOURS PRN
Status: DISCONTINUED | OUTPATIENT
Start: 2023-08-03 | End: 2023-08-04 | Stop reason: HOSPADM

## 2023-08-03 RX ORDER — FAMOTIDINE 20 MG/1
40 TABLET, FILM COATED ORAL NIGHTLY
Status: DISCONTINUED | OUTPATIENT
Start: 2023-08-03 | End: 2023-08-03 | Stop reason: DRUGHIGH

## 2023-08-03 RX ORDER — DEXTROSE MONOHYDRATE 100 MG/ML
INJECTION, SOLUTION INTRAVENOUS CONTINUOUS PRN
Status: DISCONTINUED | OUTPATIENT
Start: 2023-08-03 | End: 2023-08-04 | Stop reason: HOSPADM

## 2023-08-03 RX ORDER — ONDANSETRON 2 MG/ML
4 INJECTION INTRAMUSCULAR; INTRAVENOUS ONCE
Status: DISCONTINUED | OUTPATIENT
Start: 2023-08-03 | End: 2023-08-04

## 2023-08-03 RX ADMIN — PANTOPRAZOLE SODIUM 40 MG: 40 INJECTION, POWDER, FOR SOLUTION INTRAVENOUS at 13:21

## 2023-08-03 RX ADMIN — DOXYCYCLINE 100 MG: 100 INJECTION, POWDER, LYOPHILIZED, FOR SOLUTION INTRAVENOUS at 16:48

## 2023-08-03 RX ADMIN — IOPAMIDOL 75 ML: 755 INJECTION, SOLUTION INTRAVENOUS at 14:00

## 2023-08-03 RX ADMIN — WATER 1000 MG: 1 INJECTION INTRAMUSCULAR; INTRAVENOUS; SUBCUTANEOUS at 16:46

## 2023-08-03 RX ADMIN — FENTANYL CITRATE 25 MCG: 50 INJECTION INTRAMUSCULAR; INTRAVENOUS at 20:10

## 2023-08-03 RX ADMIN — Medication 10 ML: at 22:42

## 2023-08-03 ASSESSMENT — PAIN - FUNCTIONAL ASSESSMENT: PAIN_FUNCTIONAL_ASSESSMENT: 0-10

## 2023-08-03 ASSESSMENT — PAIN SCALES - GENERAL
PAINLEVEL_OUTOF10: 0
PAINLEVEL_OUTOF10: 0
PAINLEVEL_OUTOF10: 6

## 2023-08-03 NOTE — ED PROVIDER NOTES
SURGICAL HISTORY     Past Surgical History:   Procedure Laterality Date    CARDIAC CATHETERIZATION  2015    Done in [address]      COLONOSCOPY  2017    Negative findings    DIALYSIS FISTULA CREATION Left 01/28/2022    REVISION AV FISTULA LEFT ARM performed by Crow Fields MD at [address]    IVC FILTER INSERTION  01/20/2023    DVT and pulmonary emboli, bleeding duodenal ulcer    PTCA  2015    PTCA [facility],[place]    UPPER GASTROINTESTINAL ENDOSCOPY N/A 12/01/2021    EGD BIOPSY performed by Garcia Stanley MD at [address] [place] [address] 01/18/2023    EGD BIOPSY performed by Garcia Stanley MD at [address] [place] [address] N/A 2/10/2023    EGD ESOPHAGOGASTRODUODENOSCOPY performed by Garcia Stanley MD at [address] N/A 11/24/2021    CATHETER INSERTION  TUNNELED HEMODIALYSIS, WITH REMOVAL OF TEMPORARY CATHETER performed by Crow Fields MD at [facility]       Previous Medications    ACETAMINOPHEN (TYLENOL) 650 MG EXTENDED RELEASE TABLET    Take 2 tablets by mouth every 8 hours as needed for Pain    ASPIRIN (ASPIRIN CHILDRENS) 81 MG CHEWABLE TABLET    Take 1 tablet by mouth daily    ATORVASTATIN (LIPITOR) 40 MG TABLET    Take 1 tablet by mouth daily    BD ULTRA-FINE PEN NEEDLES 29G X 12.7MM MISC        BLOOD GLUCOSE MONITOR STRIPS    Test 3 times a day. Dispense sufficient amount for indicated testing frequency plus additional to accommodate PRN testing needs.     BLOOD GLUCOSE MONITORING SUPPL (BLOOD GLUCOSE MONITOR SYSTEM) W/DEVICE KIT    by Does not apply route    BLOOD GLUCOSE TEST STRIPS (ASCENSIA AUTODISC VI;ONE TOUCH ULTRA TEST VI) STRIP    1 each by In Vitro route 4 times daily (after meals and at bedtime)    CALCIUM ACETATE (PHOSLO) 667 MG CAPS CAPSULE    Take 1 capsule by mouth 3 times daily (with meals)    CEPHALEXIN (KEFLEX) 500 MG CAPSULE lower extremities, Mild pulmonary hypertension ([address]), MSSA bacteremia, Nausea & vomiting, Obesity, Class III, BMI 40-49.9 (morbid obesity) ([address]), Periumbilical abdominal pain, and S/P insertion of inferior vena caval filter. ED Course Summary:(labs and imaging reviewed, interventions, reassessment, consults,shared decision making with patient, disposition)      Patient was given 40 of IV Protonix. Still was quite positive but no gross blood seen. Patient was given 40 mcg of IV fentanyl for abdominal pain. EKG shows sinus tachycardia 107 beats minute no signs of ST changes no STEMI. Troponin is 51 seconds 49 decreasing no NSTEMI. CBC shows a hemoglobin 11.2 with a second 11.6. Chemistry showed chronically elevated creatinine 3.5 she is a dialysis patient normal potassium 3.8 glucose 220. Normal anion gap no DKA. Lipase normal at 31. Lactic acid normal.  BNP chronically elevated 13,000. Patient did have a CT a pulmonary of the chest that showed no PE;increased markings in the right lung base concerning for pneumonia. Also stones in the gallbladder but no ductal dilatation. Patient started 1 g of IV Rocephin and 100 mg of IV doxycycline for pneumonia. Chest x-ray question mild CHF no fluid overload on clinical exam..  CT of the pelvis again showed stones in the gallbladder. No tenderness to [facility] sign on clinical exam.  Ultrasound showed gallstones and gallbladder thickening normal common bile duct. No sign of Cole sign. There is a general surgery. They do not recommend further biotics to treat cholecystitis. Consult cardiology was in house. We will also consult regarding GI bleed, Dr. Onur Ramos. Patient is abdominal pain upon admission. Patient admitted for pneumonia and rectal bleeding. Patient with no dialysis. She also department. If etiology of patient had IV contrast today will need dialysis within the next 24 hours stable right side. Patient admitted to hospice service.   Stable

## 2023-08-03 NOTE — TELEPHONE ENCOUNTER
Pharmacy requesting for Dr Clare Richardson to provide a max daily dose instead of \"18 units with meals plus sliding scale, use sliding scale only if skip a meal.\"    Insulin Aspart, w/Niacinamide, (FIASP FLEXTOUCH) 100 UNIT/ML SOPN

## 2023-08-03 NOTE — TELEPHONE ENCOUNTER
Patient called very upset that the process is taking so long for her to get her medication , stated that pharmacy told her the office should know what needs done to get medication approved , office to send medical nec clinical proving pt needs medication and some type of code    Patient upset that nobody ever calls her back with any update when she request a call back  , she always needs to call office to see what is going on    Patient stated doesn't seem like anyone cares about her health and her receiving the medication she needs       I did read message that was written on 8. 2.2023 to patient     Pt asked if another Dr in the office can assist with getting her , her medication today

## 2023-08-03 NOTE — ED NOTES
Radiology Procedure Waiver   Name: Adamaris Woodruff  : 1955  MRN: [id number]    Date:  8/3/23    Time: 12:10 PM EDT    Benefits of immediately proceeding with Radiology exam(s) without pre-testing outweigh the risks or are not indicated as specified below and therefore the following is/are being waived:    [] Pregnancy test   [] Patients LMP on-time and regular.   [] Patient had Tubal Ligation or has other Contraception Device. [] Patient  is Menopausal or Premenarcheal.    [] Patient had Full or Partial Hysterectomy. [] Protocol for Iodine allergy    [] MRI Questionnaire     [x] BUN/Creatinine   [] Patient age w/no hx of renal dysfunction. [x] Patient on Dialysis. [] Recent Normal Labs.   Electronically signed by Levar Nails MD on 8/3/23 at 12:10 PM EDT              Levar Nails MD  [phone] No

## 2023-08-03 NOTE — ED NOTES
Patient states that she is on Oxygen at home.   Placed patient on 2L with nasal cannula     Alicia Dominique RN  08/03/23 [id number]

## 2023-08-04 VITALS
SYSTOLIC BLOOD PRESSURE: 129 MMHG | DIASTOLIC BLOOD PRESSURE: 59 MMHG | BODY MASS INDEX: 43.58 KG/M2 | HEIGHT: 64 IN | WEIGHT: 255.29 LBS | RESPIRATION RATE: 18 BRPM | TEMPERATURE: 98 F | HEART RATE: 100 BPM | OXYGEN SATURATION: 97 %

## 2023-08-04 LAB
ANION GAP SERPL CALCULATED.3IONS-SCNC: 16 MMOL/L (ref 7–16)
B PARAP IS1001 DNA NPH QL NAA+NON-PROBE: NOT DETECTED
B PERT DNA SPEC QL NAA+PROBE: NOT DETECTED
BASOPHILS # BLD: 0.04 K/UL (ref 0–0.2)
BASOPHILS NFR BLD: 1 % (ref 0–2)
BUN SERPL-MCNC: 41 MG/DL (ref 6–23)
C PNEUM DNA NPH QL NAA+NON-PROBE: NOT DETECTED
CALCIUM SERPL-MCNC: 8.7 MG/DL (ref 8.6–10.2)
CHLORIDE SERPL-SCNC: 91 MMOL/L (ref 98–107)
CO2 SERPL-SCNC: 28 MMOL/L (ref 22–29)
CREAT SERPL-MCNC: 6.4 MG/DL (ref 0.5–1)
EOSINOPHIL # BLD: 0.4 K/UL (ref 0.05–0.5)
EOSINOPHILS RELATIVE PERCENT: 6 % (ref 0–6)
ERYTHROCYTE [DISTWIDTH] IN BLOOD BY AUTOMATED COUNT: 14.3 % (ref 11.5–15)
FLUAV RNA NPH QL NAA+NON-PROBE: NOT DETECTED
FLUBV RNA NPH QL NAA+NON-PROBE: NOT DETECTED
GFR SERPL CREATININE-BSD FRML MDRD: 7 ML/MIN/1.73M2
GLUCOSE SERPL-MCNC: 204 MG/DL (ref 74–99)
HADV DNA NPH QL NAA+NON-PROBE: NOT DETECTED
HCOV 229E RNA NPH QL NAA+NON-PROBE: NOT DETECTED
HCOV HKU1 RNA NPH QL NAA+NON-PROBE: NOT DETECTED
HCOV NL63 RNA NPH QL NAA+NON-PROBE: NOT DETECTED
HCOV OC43 RNA NPH QL NAA+NON-PROBE: NOT DETECTED
HCT VFR BLD AUTO: 31.9 % (ref 34–48)
HGB BLD-MCNC: 10.4 G/DL (ref 11.5–15.5)
HMPV RNA NPH QL NAA+NON-PROBE: NOT DETECTED
HPIV1 RNA NPH QL NAA+NON-PROBE: NOT DETECTED
HPIV2 RNA NPH QL NAA+NON-PROBE: NOT DETECTED
HPIV3 RNA NPH QL NAA+NON-PROBE: NOT DETECTED
HPIV4 RNA NPH QL NAA+NON-PROBE: NOT DETECTED
IMM GRANULOCYTES # BLD AUTO: 0.04 K/UL (ref 0–0.58)
IMM GRANULOCYTES NFR BLD: 1 % (ref 0–5)
LYMPHOCYTES NFR BLD: 1.18 K/UL (ref 1.5–4)
LYMPHOCYTES RELATIVE PERCENT: 16 % (ref 20–42)
M PNEUMO DNA NPH QL NAA+NON-PROBE: NOT DETECTED
MCH RBC QN AUTO: 33 PG (ref 26–35)
MCHC RBC AUTO-ENTMCNC: 32.6 G/DL (ref 32–34.5)
MCV RBC AUTO: 101.3 FL (ref 80–99.9)
METER GLUCOSE: 127 MG/DL (ref 74–99)
METER GLUCOSE: 235 MG/DL (ref 74–99)
MONOCYTES NFR BLD: 0.79 K/UL (ref 0.1–0.95)
MONOCYTES NFR BLD: 11 % (ref 2–12)
NEUTROPHILS NFR BLD: 66 % (ref 43–80)
NEUTS SEG NFR BLD: 4.8 K/UL (ref 1.8–7.3)
PLATELET # BLD AUTO: 122 K/UL (ref 130–450)
PMV BLD AUTO: 11.2 FL (ref 7–12)
POTASSIUM SERPL-SCNC: 4.4 MMOL/L (ref 3.5–5)
RBC # BLD AUTO: 3.15 M/UL (ref 3.5–5.5)
RSV RNA NPH QL NAA+NON-PROBE: NOT DETECTED
RV+EV RNA NPH QL NAA+NON-PROBE: NOT DETECTED
SARS-COV-2 RNA NPH QL NAA+NON-PROBE: NOT DETECTED
SODIUM SERPL-SCNC: 135 MMOL/L (ref 132–146)
SPECIMEN DESCRIPTION: NORMAL
WBC OTHER # BLD: 7.3 K/UL (ref 4.5–11.5)

## 2023-08-04 PROCEDURE — 2580000003 HC RX 258: Performed by: STUDENT IN AN ORGANIZED HEALTH CARE EDUCATION/TRAINING PROGRAM

## 2023-08-04 PROCEDURE — 99239 HOSP IP/OBS DSCHRG MGMT >30: CPT | Performed by: INTERNAL MEDICINE

## 2023-08-04 PROCEDURE — 5A1D70Z PERFORMANCE OF URINARY FILTRATION, INTERMITTENT, LESS THAN 6 HOURS PER DAY: ICD-10-PCS | Performed by: INTERNAL MEDICINE

## 2023-08-04 PROCEDURE — 2700000000 HC OXYGEN THERAPY PER DAY

## 2023-08-04 PROCEDURE — A4216 STERILE WATER/SALINE, 10 ML: HCPCS | Performed by: STUDENT IN AN ORGANIZED HEALTH CARE EDUCATION/TRAINING PROGRAM

## 2023-08-04 PROCEDURE — 90935 HEMODIALYSIS ONE EVALUATION: CPT

## 2023-08-04 PROCEDURE — 82947 ASSAY GLUCOSE BLOOD QUANT: CPT

## 2023-08-04 PROCEDURE — 6370000000 HC RX 637 (ALT 250 FOR IP): Performed by: STUDENT IN AN ORGANIZED HEALTH CARE EDUCATION/TRAINING PROGRAM

## 2023-08-04 PROCEDURE — 6360000002 HC RX W HCPCS: Performed by: STUDENT IN AN ORGANIZED HEALTH CARE EDUCATION/TRAINING PROGRAM

## 2023-08-04 PROCEDURE — C9113 INJ PANTOPRAZOLE SODIUM, VIA: HCPCS | Performed by: STUDENT IN AN ORGANIZED HEALTH CARE EDUCATION/TRAINING PROGRAM

## 2023-08-04 PROCEDURE — 2500000003 HC RX 250 WO HCPCS: Performed by: STUDENT IN AN ORGANIZED HEALTH CARE EDUCATION/TRAINING PROGRAM

## 2023-08-04 PROCEDURE — 87340 HEPATITIS B SURFACE AG IA: CPT

## 2023-08-04 PROCEDURE — 80048 BASIC METABOLIC PNL TOTAL CA: CPT

## 2023-08-04 PROCEDURE — 85025 COMPLETE CBC W/AUTO DIFF WBC: CPT

## 2023-08-04 RX ORDER — DOXYCYCLINE HYCLATE 100 MG
100 TABLET ORAL 2 TIMES DAILY
Qty: 10 TABLET | Refills: 0 | Status: SHIPPED | OUTPATIENT
Start: 2023-08-04 | End: 2023-08-09

## 2023-08-04 RX ADMIN — ACETAMINOPHEN 650 MG: 325 TABLET ORAL at 03:39

## 2023-08-04 RX ADMIN — CALCIUM ACETATE 667 MG: 667 CAPSULE ORAL at 12:40

## 2023-08-04 RX ADMIN — SODIUM CHLORIDE 40 MG: 9 INJECTION INTRAMUSCULAR; INTRAVENOUS; SUBCUTANEOUS at 05:46

## 2023-08-04 RX ADMIN — DOXYCYCLINE 100 MG: 100 INJECTION, POWDER, LYOPHILIZED, FOR SOLUTION INTRAVENOUS at 05:47

## 2023-08-04 RX ADMIN — Medication 10 ML: at 08:36

## 2023-08-04 ASSESSMENT — PAIN SCALES - GENERAL
PAINLEVEL_OUTOF10: 8
PAINLEVEL_OUTOF10: 2
PAINLEVEL_OUTOF10: 0

## 2023-08-04 ASSESSMENT — PAIN DESCRIPTION - LOCATION: LOCATION: BACK

## 2023-08-04 ASSESSMENT — PAIN DESCRIPTION - ORIENTATION: ORIENTATION: LOWER

## 2023-08-04 ASSESSMENT — ENCOUNTER SYMPTOMS
ALLERGIC/IMMUNOLOGIC NEGATIVE: 1
VOMITING: 0
SHORTNESS OF BREATH: 1
NAUSEA: 0
COUGH: 0
DIARRHEA: 0
ABDOMINAL PAIN: 0
CONSTIPATION: 0
BLOOD IN STOOL: 1
ABDOMINAL DISTENTION: 0
RECTAL PAIN: 0

## 2023-08-04 ASSESSMENT — PAIN DESCRIPTION - DESCRIPTORS: DESCRIPTORS: ACHING;DISCOMFORT;SORE

## 2023-08-04 NOTE — CONSULTS
[address]                  [address] [facility], [address] [place] [address]                                  CONSULTATION    PATIENT NAME: Miriam Rutherford                     :        1955  MED REC NO:   [id number]                            ROOM:       [id number]  ACCOUNT NO:   [de-identified]                           ADMIT DATE: 2023  PROVIDER:     Garth Aase, MD    CONSULT DATE:  2023    REASON FOR CONSULTATION:  End-stage renal disease. HISTORY OF PRESENT ILLNESS:  The patient is a 72-year-old female who I  am being asked to see for end-stage renal disease. The patient has a  history of end-stage renal disease, heart failure, history of pulmonary  emboli, diabetes type 2, coronary artery disease, and hyperlipidemia  among other medical problems. She presented to the hospital with a chief complaint of rectal bleeding. She had hemodialysis yesterday. She was seen by General Surgery and  they recommended no acute intervention. It was felt that her bleeding  was related to hemorrhoids. I saw her on hemodialysis this morning. She is alert and oriented. She  is on room air. She appears in no apparent distress. We discussed  trying to challenge her dry weight. She is resistant to this and I  believe she has been resistant to that idea in the past.    REVIEW OF SYSTEMS:  Twelve point done and negative except for those  mentioned above. ALLERGIES:  Include PENICILLIN and MORPHINE. PAST MEDICAL HISTORY:  Includes end-stage renal disease. PAST SURGICAL HISTORY:  Includes AV fistula. SOCIAL HISTORY:  No smoking. No drinking. FAMILY HISTORY:  Reviewed and noncontributory. MEDICATIONS:  Include atorvastatin and Pepcid. PHYSICAL EXAMINATION:  VITAL SIGNS:  Blood pressure 115/56, heart rate 55. HEENT:  Head:  Atraumatic and normocephalic.   NECK:  Supple, symmetrical.  Eyes:  Pupils are equal, round, and  reactive to light

## 2023-08-04 NOTE — DISCHARGE SUMMARY
Discharge Summary     Patient ID:  Marcela Mazariegos  [id number]  80 y.o. 1955 female  Jim Vazquez MD        Admit date: 8/3/2023    Discharge date and time:  8/4/2023  2:08 PM      Activity level: Increased as tolerated, no driving until seen by primary care  Diet: Renal diabetic  Labs: Predialysis  Disposition: Home  Condition on Discharge: Stable  DME:    Admit Diagnoses:   Patient Active Problem List   Diagnosis    Nausea & vomiting    Sepsis without acute organ dysfunction ([address])    Essential hypertension    Hyperlipidemia    Diabetes mellitus type 2 with complications ([address])    Neck pain    Anemia in CKD (chronic kidney disease)    Pneumonia due to COVID-19 virus    Mild pulmonary hypertension (HCC)    Obesity, Class III, BMI 40-49.9 (morbid obesity) ([address])    History of Clostridioides difficile colitis    Anxiety and depression    At high risk for falls    Dizziness on standing    Acute pulmonary embolism (HCC)    H/O heart artery stent    Pulmonary embolism and infarction (HCC)    Diabetes mellitus (HCC)    Hematemesis    ESRD (end stage renal disease) on dialysis (HCC)    Leg swelling    Lymphedema of both lower extremities    Rectal bleeding    BRBPR (bright red blood per rectum)    Acute on chronic heart failure with preserved ejection fraction (HCC)    Aortic stenosis, mild    Anemia due to stage 5 chronic kidney disease ([address])    History of pulmonary embolus (PE)    Presence of inferior vena cava filter    Hyperkalemia    Pneumonia due to organism    Gallstones       Discharge Diagnoses: Principal Problem:    Rectal bleeding  Active Problems:    ESRD (end stage renal disease) on dialysis ([address])    Sepsis without acute organ dysfunction (HCC)    Pneumonia due to organism    Gallstones  Resolved Problems:    * No resolved hospital problems.  *      Consults:  IP CONSULT TO IV TEAM  IP CONSULT TO GENERAL SURGERY  IP CONSULT TO NEPHROLOGY    Procedures: None    Hospital Course: 81-year-old female presented

## 2023-08-04 NOTE — ACP (ADVANCE CARE PLANNING)
Advance Care Planning   Healthcare Decision Maker:    Primary Decision Maker: Yoandy Marito Child - [phone]    Secondary Decision Maker: Hermilo Real Child - [phone]    Provided patient with Healthcare Power of  and Living Will forms.

## 2023-08-04 NOTE — CARE COORDINATION
Social Work/Discharge Planning:  Discharge order noted. Notified Deann with [facility] of discharge.   Electronically signed by HILDA Lu on 8/4/2023 at 2:07 PM

## 2023-08-04 NOTE — PROGRESS NOTES
4 Eyes Skin Assessment     NAME:  Matthew Head  YOB: 1955  MEDICAL RECORD NUMBER:  [id number]    The patient is being assessed for  Admission    I agree that at least one RN has performed a thorough Head to Toe Skin Assessment on the patient. ALL assessment sites listed below have been assessed. Areas assessed by both nurses:    Head, Face, Ears, Shoulders, Back, Chest, Arms, Elbows, Hands, Sacrum. Buttock, Coccyx, Ischium, Legs. Feet and Heels, and Under Medical Devices         Does the Patient have a Wound? Yes wound(s) were present on assessment.  LDA wound assessment was Initiated and completed by RN     - Right lower leg  Dm Prevention initiated by RN: Yes  Wound Care Orders initiated by RN: Yes    Pressure Injury (Stage 3,4, Unstageable, DTI, NWPT, and Complex wounds) if present, place Wound referral order by RN under : No    New Ostomies, if present place, Ostomy referral order under : No     Nurse 1 eSignature: Electronically signed by Bess Soni RN on 8/4/23 at 12:08 AM EDT    **SHARE this note so that the co-signing nurse can place an eSignature**    Nurse 2 eSignature: Electronically signed by Kyrie Patel RN on 8/4/23 at 12:10 AM EDT

## 2023-08-04 NOTE — CARE COORDINATION
Social Work/Discharge Planning:  Met with patient and completed initial assessment. Explained Social Work role and discussed transition of care/discharge planning. Patient lives with her daughter in a one story house. PTA she uses a front wheeled walker. She wears two liters oxygen at home supplied through [employer]. She has dialysis every Tuesday, Thursday and Saturday at [facility] in [place]. She has a home health care history with [facility]. Provided patient with [address] and Living Will forms. She plans to return home at discharge and denies any home care needs at this time. Will continue to follow and assist with discharge planning.   Electronically signed by TheHILDA Watson on 8/4/2023 at 1:45 PM

## 2023-08-04 NOTE — DISCHARGE INSTRUCTIONS
Your information:  Name: Chelo Solis  : 1955    Your instructions:                     HEART FAILURE  / CONGESTIVE HEART FAILURE  DISCHARGE INSTRUCTIONS:  GUIDELINES TO FOLLOW AT HOME    Self- Managed Care:     MEDICATIONS:  Take your medication as directed. If you are experiencing any side effects, inform your doctor, Do not stop taking any of your medications without letting your doctor know. Check with your doctor before taking any over-the-counter medications / herbal / or dietary supplements. They may interfere with your other medications. Do not take ibuprofen (Advil or Motrin) and naproxen (Aleve) without talking to your doctor first. They could make your heart failure worse. WEIGHT MONITORING:   Weigh yourself everyday (with the same scale) around the same time of the day and write it down. (you can chart them on a calendar or keep track of them on paper. Notify your doctor of a weight gain of 3 pounds or more in 1 day   OR a total of 5 pounds or more in 1 week    Take your weight record to your doctor visits  Also, the same goes if you loose more than 3# in one day, let your heart doctor know. DIET:   Cardiac heart healthy diet- Low saturated / low trans fat, no added salt, caffeine restricted, Low sodium diet-   No more than 2,000mg (2 grams) of salt / sodium per day (which equals to a little less than  a teaspoon of salt)  If your doctor wants you on a fluid restriction. ..it is usually recommended a fluid limit of 2,000cc -  Fluid restriction- 2,000 ml (milliliters) = 64 ounces = you can have 8 glasses of fluid per day (each glass 8 ounces)    Follow a low salt diet - avoid using salt at the table, avoid / limit use of canned soups, processed / packaged foods, salted snacks, olives and pickles. Do not use a salt substitute without checking with your doctor, they may contain a high amount of potassioum. (Mrs. Patric Zimmerman is safe to use).     Limit the use of alcohol CALL YOUR DOCTOR THE FIRST DAY YOU NOTICE ANY OF THESE   SYMPTOMS:  You have a weight gain of 3 pounds or more in 1 day         OR 5 pounds or more in one week  More shortness of breath  More swelling of your stomach, legs, ankles or feet  Feeling more tired, No energy  Dry hacky cough  Dizziness  More chest pain / discomfort       (CALL 911 IF ANY OF THE FOLLOWING OCCURS  Chest pain (not relieved with nitroglycerine, if you have been prescribed this medication)  Severe shortness of breath  Faint / Pass out  Confusion / cannot think clearly  If symptoms get worse           SMOKING - TOBACCO USE:  * IF YOU SMOKE - STOP! Kick the habit. [facility] Program is offered at [address] [facility] and [address]. Call [phone] extension [phone] for more information. ACTIVITY:   (Ask your doctor when you will be able to return to work and before starting any exercise program.  Do not drive unless unless your doctor has given you permission to do so). Start light exercise. Even if you can only do a small amount, exercise will help you get stronger, have more energy, help manage your weight and decrease  stress. Walking is an easy way to get exercise. Start out slowly and  increase the amount you walk as tolerated  If you become short of breath, dizzy or have chest pain; stop, sit down, and rest.  If you feel \"wiped out\" the day after you exercise, walk at a slower pace or for a shorter distance. You can gradually increase the pace or amount of time. (Do not exercise right after a meal or in extreme temperatures, such as above 85 degrees, if the air is really humid, or wind chill is less than 20 degrees)                                             ADDITIONAL INFORMATION:  Avoid getting sick from colds and the flu.  Stay away from friends or family that you know may have a contagious illness  Get plenty of rest   Get a flu shot each

## 2023-08-04 NOTE — PLAN OF CARE
Patient's chart updated to reflect:      . - HF care plan, HF education points and HF discharge instructions.  -Orders: 2 gram sodium diet, daily weights, I/O.  -PCP and/or Cardiologist appointment to be scheduled within 7 days of hospital discharge.  -History of HF, not primary admission Dx. Patient admitted for treatment of rectal bleeding.      Nathaniel Marx RN BSN  Heart Failure Navigator

## 2023-08-04 NOTE — H&P
[facility] History and Physical      CHIEF COMPLAINT: Shortness of breath, rectal bleeding    History of Present Illness:    Ms. Veronika Henao is a 81-year-old female with past medical history significant for ESRD on HD, chronic diastolic CHF, history of PE (12/2022), DM type II, hypertension, CAD s/p PCI, hyperlipidemia, obesity class III who presents with shortness of breath and rectal bleeding. In talking with Ms. Stanley Ordoñez, she notes shortness of breath over the past few days as well as onset of rectal bleeding yesterday. She reports shortness of breath was moderate and worse with exertion. Nothing seems to make it better or worse. She reports cough. She denies fevers or chills. She also noted onset of rectal bleeding with bright red blood per rectum yesterday. She reports loose stools. She reports that it is moderate in severity when wiping. She became concerned and decided to present to the [facility] ED for further evaluation. In the ED, vitals on presentation were temp 97.9, RR 20, , /56, O2 sat 90% on room air. Lab work was obtained which revealed serum sodium 137, potassium 3.8, bicarb 30, creatinine 3.5, blood glucose 220, lactic acid 1.8, proBNP 13,582, high-sensitivity troponin 51 followed by 49. CBC was obtained which revealed WBC 11.6 with left shift, hemoglobin 11.2 with MCV 99.7, platelets 904G. Chest x-ray was obtained which revealed cardiomegaly. Pulmonary venous hypertension, findings may reflect early CHF. This is followed up with CT abdomen/pelvis with IV contrast as well as CTA pulmonary with contrast which revealed no evidence of pulmonary embolism. There is reactive lymph nodes seen in the mediastinum and right hilar region with some increased markings seen at the right lung base concerning for pneumonia. Bronchial wall thickening to suggest a nonspecific bronchiolitis.   Abdomen and pelvis grossly unremarkable with no bruits  Abdomen: soft, non-tender, non-distended, normal bowel sounds, no masses or organomegaly  Extremities: no cyanosis, no clubbing and no edema  Neurologic: no cranial nerve deficit and speech normal        LABS:  Recent Labs     08/03/23  1212      K 3.8   CL 92*   CO2 30*   BUN 20   CREATININE 3.5*   GLUCOSE 220*   CALCIUM 9.2       Recent Labs     08/03/23  1212   WBC 11.6*   RBC 3.39*   HGB 11.2*   HCT 33.8*   MCV 99.7   MCH 33.0   MCHC 33.1   RDW 13.9   MPV 11.1       No results for input(s): POCGLU in the last 72 hours. Radiology:   US ABDOMEN LIMITED   Final Result   Cholelithiasis and gallbladder wall thickening. Normal common bile duct. Hepatomegaly at 17.9 cm with normal liver parenchyma. CT ABDOMEN PELVIS W IV CONTRAST Additional Contrast? None   Final Result   No evidence of pulmonary embolism. There is reactive lymph nodes seen in the   mediastinum and right hilar region with some increased markings seen at the   right lung base concerning for pneumonia. Bronchial wall thickening to   suggest a nonspecific bronchiolitis. Abdomen and pelvis is grossly unremarkable with no inflammatory changes seen   throughout the gastrointestinal tract. Stones layering in the gallbladder   with no evidence of wall thickening or biliary ductal dilatation. Large umbilical hernia containing fat only. CTA PULMONARY W CONTRAST   Final Result   No evidence of pulmonary embolism. There is reactive lymph nodes seen in the   mediastinum and right hilar region with some increased markings seen at the   right lung base concerning for pneumonia. Bronchial wall thickening to   suggest a nonspecific bronchiolitis. Abdomen and pelvis is grossly unremarkable with no inflammatory changes seen   throughout the gastrointestinal tract. Stones layering in the gallbladder   with no evidence of wall thickening or biliary ductal dilatation.       Large umbilical hernia containing fat

## 2023-08-04 NOTE — PLAN OF CARE
Problem: Discharge Planning  Goal: Discharge to home or other facility with appropriate resources  Outcome: Progressing  Flowsheets (Taken 8/4/2023 0021)  Discharge to home or other facility with appropriate resources:   Refer to discharge planning if patient needs post-hospital services based on physician order or complex needs related to functional status, cognitive ability or social support system   Arrange for interpreters to assist at discharge as needed   Identify discharge learning needs (meds, wound care, etc)   Arrange for needed discharge resources and transportation as appropriate   Identify barriers to discharge with patient and caregiver     Problem: Chronic Conditions and Co-morbidities  Goal: Patient's chronic conditions and co-morbidity symptoms are monitored and maintained or improved  Outcome: Progressing  Flowsheets (Taken 8/4/2023 0021)  Care Plan - Patient's Chronic Conditions and Co-Morbidity Symptoms are Monitored and Maintained or Improved:   Update acute care plan with appropriate goals if chronic or comorbid symptoms are exacerbated and prevent overall improvement and discharge   Collaborate with multidisciplinary team to address chronic and comorbid conditions and prevent exacerbation or deterioration   Monitor and assess patient's chronic conditions and comorbid symptoms for stability, deterioration, or improvement     Problem: Respiratory - Adult  Goal: Achieves optimal ventilation and oxygenation  Outcome: Progressing  Flowsheets (Taken 8/4/2023 0021)  Achieves optimal ventilation and oxygenation:   Assess for changes in respiratory status   Assess for changes in mentation and behavior   Position to facilitate oxygenation and minimize respiratory effort   Oxygen supplementation based on oxygen saturation or arterial blood gases   Initiate smoking cessation protocol as indicated   Encourage broncho-pulmonary hygiene including cough, deep breathe, incentive spirometry   Assess the need

## 2023-08-07 LAB — HBV SURFACE AG SERPL QL IA: NONREACTIVE

## 2023-08-07 NOTE — PROGRESS NOTES
CLINICAL PHARMACY NOTE: MEDS TO BEDS    Total # of Prescriptions Filled: 1   The following medications were delivered to the patient:  Doxycycline 100 mg     Additional Documentation:

## 2023-08-07 NOTE — TELEPHONE ENCOUNTER
Dr. Sonya Christian changed this medication  Chanel Bledsoe MD  You; Chepe Astudillo  Clinical Staff 4 days ago       Phx notified,switch humalog to Novalog with max 90/day & lantus to basaglar,same directions

## 2023-08-08 ENCOUNTER — TELEPHONE (OUTPATIENT)
Dept: PRIMARY CARE CLINIC | Age: 68
End: 2023-08-08

## 2023-08-08 NOTE — TELEPHONE ENCOUNTER
Care Transitions Initial Follow Up Call    Outreach made within 2 business days of discharge: Yes    Patient: Alok Alejo Patient : 1955   MRN: [id number]  Reason for Admission: There are no discharge diagnoses documented for the most recent discharge. Discharge Date: 23       Spoke with: Patient     Discharge department/facility: [facility] Interactive Patient Contact:  Was patient able to fill all prescriptions: Yes  Was patient instructed to bring all medications to the follow-up visit: Yes  Is patient taking all medications as directed in the discharge summary?  Yes  Does patient understand their discharge instructions: Yes  Does patient have questions or concerns that need addressed prior to 7-14 day follow up office visit: no    Scheduled appointment with PCP within 7-14 days    Follow Up  Future Appointments   Date Time Provider [address] [place] [address]   2024 10:00 AM Satinder Buchanan MD [facility]   4/10/2024  8:30 AM Trey Tracey MD [place], [place]

## 2023-08-10 ENCOUNTER — OFFICE VISIT (OUTPATIENT)
Dept: PRIMARY CARE CLINIC | Age: 68
End: 2023-08-10

## 2023-08-10 VITALS
OXYGEN SATURATION: 89 % | BODY MASS INDEX: 43.19 KG/M2 | RESPIRATION RATE: 18 BRPM | HEIGHT: 64 IN | WEIGHT: 253 LBS | HEART RATE: 86 BPM | SYSTOLIC BLOOD PRESSURE: 88 MMHG | DIASTOLIC BLOOD PRESSURE: 50 MMHG | TEMPERATURE: 96.9 F

## 2023-08-10 DIAGNOSIS — I95.9 HYPOTENSION, UNSPECIFIED HYPOTENSION TYPE: ICD-10-CM

## 2023-08-10 DIAGNOSIS — R42 DIZZINESS: ICD-10-CM

## 2023-08-10 DIAGNOSIS — Z09 HOSPITAL DISCHARGE FOLLOW-UP: Primary | ICD-10-CM

## 2023-08-10 DIAGNOSIS — Z99.2 ESRD ON HEMODIALYSIS (HCC): ICD-10-CM

## 2023-08-10 DIAGNOSIS — D68.9 COAGULATION DEFECT (HCC): ICD-10-CM

## 2023-08-10 DIAGNOSIS — N18.6 ESRD ON HEMODIALYSIS (HCC): ICD-10-CM

## 2023-08-10 DIAGNOSIS — E66.01 OBESITY, CLASS III, BMI 40-49.9 (MORBID OBESITY) (HCC): ICD-10-CM

## 2023-08-10 DIAGNOSIS — E11.65 UNCONTROLLED TYPE 2 DIABETES MELLITUS WITH HYPERGLYCEMIA (HCC): ICD-10-CM

## 2023-08-10 LAB
CHP ED QC CHECK: ABNORMAL
GLUCOSE BLD-MCNC: 153 MG/DL

## 2023-08-10 RX ORDER — MIDODRINE HYDROCHLORIDE 10 MG/1
10 TABLET ORAL 2 TIMES DAILY
Qty: 60 TABLET | Refills: 2 | Status: SHIPPED
Start: 2023-08-10 | End: 2023-08-10

## 2023-08-10 RX ORDER — MIDODRINE HYDROCHLORIDE 10 MG/1
10 TABLET ORAL 2 TIMES DAILY
Qty: 60 TABLET | Refills: 2 | Status: SHIPPED | OUTPATIENT
Start: 2023-08-10

## 2023-08-10 RX ORDER — INSULIN ASPART 100 [IU]/ML
INJECTION, SOLUTION INTRAVENOUS; SUBCUTANEOUS
COMMUNITY
Start: 2023-08-04

## 2023-08-10 RX ORDER — INSULIN GLARGINE 100 [IU]/ML
INJECTION, SOLUTION SUBCUTANEOUS
COMMUNITY
Start: 2023-08-04

## 2023-08-10 NOTE — PROGRESS NOTES
Accurate as of August 10, 2023 11:59 PM. If you have any questions, ask your nurse or doctor. CHANGE how you take these medications      * midodrine 10 MG tablet  Commonly known as: PROAMATINE  What changed: Another medication with the same name was added. Make sure you understand how and when to take each. Changed by: Clayton Lea MD     * midodrine 10 MG tablet  Commonly known as: PROAMATINE  Take 1 tablet by mouth in the morning and at bedtime Skip the dose if BP is > 105  What changed: You were already taking a medication with the same name, and this prescription was added. Make sure you understand how and when to take each. Changed by: Clayton Lea MD           * This list has 2 medication(s) that are the same as other medications prescribed for you. Read the directions carefully, and ask your doctor or other care provider to review them with you.                 CONTINUE taking these medications      acetaminophen 650 MG extended release tablet  Commonly known as: TYLENOL     aspirin 81 MG chewable tablet  Commonly known as: Aspirin Childrens  Take 1 tablet by mouth daily     atorvastatin 40 MG tablet  Commonly known as: LIPITOR  Take 1 tablet by mouth daily     Basaglar KwikPen 100 UNIT/ML injection pen  Generic drug: insulin glargine     * BD ULTRA-FINE PEN NEEDLES 29G X 12.7MM Misc  Generic drug: Insulin Pen Needle     * [employer] Pen Needles 29G 29G X 12MM Misc  Generic drug: Insulin Pen Needle  1 each by Does not apply route 3 times daily     * Blood Glucose Monitor System w/Device Kit     * glucose monitoring kit  1 kit by Does not apply route daily     calcium acetate 667 MG Caps capsule  Commonly known as: PHOSLO     FreeStyle Lucien 3 Sensor Misc  1 Device by Does not apply route every 14 days     Lancets 30G Misc  1 each by Does not apply route daily     NovoLOG FlexPen 100 UNIT/ML injection pen  Generic drug: insulin aspart     OXYGEN     pantoprazole 40 MG tablet  Commonly known as:

## 2023-08-16 ENCOUNTER — OFFICE VISIT (OUTPATIENT)
Dept: PRIMARY CARE CLINIC | Age: 68
End: 2023-08-16
Payer: MEDICARE

## 2023-08-16 VITALS
HEIGHT: 64 IN | HEART RATE: 81 BPM | BODY MASS INDEX: 43.71 KG/M2 | OXYGEN SATURATION: 95 % | DIASTOLIC BLOOD PRESSURE: 60 MMHG | SYSTOLIC BLOOD PRESSURE: 122 MMHG | RESPIRATION RATE: 17 BRPM | WEIGHT: 256 LBS

## 2023-08-16 DIAGNOSIS — E11.8 DIABETES MELLITUS TYPE 2 WITH COMPLICATIONS (HCC): Primary | ICD-10-CM

## 2023-08-16 DIAGNOSIS — I10 PRIMARY HYPERTENSION: ICD-10-CM

## 2023-08-16 DIAGNOSIS — E78.2 MIXED HYPERLIPIDEMIA: ICD-10-CM

## 2023-08-16 DIAGNOSIS — E66.01 OBESITY, CLASS III, BMI 40-49.9 (MORBID OBESITY) (HCC): ICD-10-CM

## 2023-08-16 PROCEDURE — 1090F PRES/ABSN URINE INCON ASSESS: CPT | Performed by: INTERNAL MEDICINE

## 2023-08-16 PROCEDURE — 3078F DIAST BP <80 MM HG: CPT | Performed by: INTERNAL MEDICINE

## 2023-08-16 PROCEDURE — 1123F ACP DISCUSS/DSCN MKR DOCD: CPT | Performed by: INTERNAL MEDICINE

## 2023-08-16 PROCEDURE — 1111F DSCHRG MED/CURRENT MED MERGE: CPT | Performed by: INTERNAL MEDICINE

## 2023-08-16 PROCEDURE — 3074F SYST BP LT 130 MM HG: CPT | Performed by: INTERNAL MEDICINE

## 2023-08-16 PROCEDURE — G8400 PT W/DXA NO RESULTS DOC: HCPCS | Performed by: INTERNAL MEDICINE

## 2023-08-16 PROCEDURE — 99213 OFFICE O/P EST LOW 20 MIN: CPT | Performed by: INTERNAL MEDICINE

## 2023-08-16 PROCEDURE — 1036F TOBACCO NON-USER: CPT | Performed by: INTERNAL MEDICINE

## 2023-08-16 PROCEDURE — 3046F HEMOGLOBIN A1C LEVEL >9.0%: CPT | Performed by: INTERNAL MEDICINE

## 2023-08-16 PROCEDURE — G8417 CALC BMI ABV UP PARAM F/U: HCPCS | Performed by: INTERNAL MEDICINE

## 2023-08-16 PROCEDURE — 3017F COLORECTAL CA SCREEN DOC REV: CPT | Performed by: INTERNAL MEDICINE

## 2023-08-16 PROCEDURE — G8427 DOCREV CUR MEDS BY ELIG CLIN: HCPCS | Performed by: INTERNAL MEDICINE

## 2023-08-16 PROCEDURE — 2022F DILAT RTA XM EVC RTNOPTHY: CPT | Performed by: INTERNAL MEDICINE

## 2023-08-17 DIAGNOSIS — I10 PRIMARY HYPERTENSION: ICD-10-CM

## 2023-08-17 DIAGNOSIS — E11.8 DIABETES MELLITUS TYPE 2 WITH COMPLICATIONS (HCC): ICD-10-CM

## 2023-08-17 DIAGNOSIS — E78.2 MIXED HYPERLIPIDEMIA: ICD-10-CM

## 2023-08-17 DIAGNOSIS — E66.01 OBESITY, CLASS III, BMI 40-49.9 (MORBID OBESITY) (HCC): ICD-10-CM

## 2023-08-17 LAB
ABSOLUTE IMMATURE GRANULOCYTE: 0.04 K/UL (ref 0–0.58)
ALBUMIN SERPL-MCNC: 4.2 G/DL (ref 3.5–5.2)
ALP BLD-CCNC: 118 U/L (ref 35–104)
ALT SERPL-CCNC: 15 U/L (ref 0–32)
ANION GAP SERPL CALCULATED.3IONS-SCNC: 24 MMOL/L (ref 7–16)
AST SERPL-CCNC: 19 U/L (ref 0–31)
BASOPHILS ABSOLUTE: 0.05 K/UL (ref 0–0.2)
BASOPHILS RELATIVE PERCENT: 1 % (ref 0–2)
BILIRUB SERPL-MCNC: 0.3 MG/DL (ref 0–1.2)
BUN BLDV-MCNC: 59 MG/DL (ref 6–23)
CALCIUM SERPL-MCNC: 9.1 MG/DL (ref 8.6–10.2)
CHLORIDE BLD-SCNC: 96 MMOL/L (ref 98–107)
CHOLESTEROL: 146 MG/DL
CO2: 22 MMOL/L (ref 22–29)
CREAT SERPL-MCNC: 6.6 MG/DL (ref 0.5–1)
EOSINOPHILS ABSOLUTE: 0.48 K/UL (ref 0.05–0.5)
EOSINOPHILS RELATIVE PERCENT: 5 % (ref 0–6)
GFR SERPL CREATININE-BSD FRML MDRD: 6 ML/MIN/1.73M2
GLUCOSE BLD-MCNC: 94 MG/DL (ref 74–99)
HCT VFR BLD CALC: 38.1 % (ref 34–48)
HDLC SERPL-MCNC: 56 MG/DL
HEMOGLOBIN: 11.6 G/DL (ref 11.5–15.5)
IMMATURE GRANULOCYTES: 0 % (ref 0–5)
LDL CHOLESTEROL: 64 MG/DL
LYMPHOCYTES ABSOLUTE: 1.63 K/UL (ref 1.5–4)
LYMPHOCYTES RELATIVE PERCENT: 17 % (ref 20–42)
MCH RBC QN AUTO: 33.2 PG (ref 26–35)
MCHC RBC AUTO-ENTMCNC: 30.4 G/DL (ref 32–34.5)
MCV RBC AUTO: 109.2 FL (ref 80–99.9)
MONOCYTES ABSOLUTE: 0.73 K/UL (ref 0.1–0.95)
MONOCYTES RELATIVE PERCENT: 8 % (ref 2–12)
NEUTROPHILS ABSOLUTE: 6.66 K/UL (ref 1.8–7.3)
NEUTROPHILS RELATIVE PERCENT: 70 % (ref 43–80)
PDW BLD-RTO: 15 % (ref 11.5–15)
PLATELET # BLD: 140 K/UL (ref 130–450)
PMV BLD AUTO: 12.2 FL (ref 7–12)
POTASSIUM SERPL-SCNC: 4.6 MMOL/L (ref 3.5–5)
RBC # BLD: 3.49 M/UL (ref 3.5–5.5)
SODIUM BLD-SCNC: 142 MMOL/L (ref 132–146)
TOTAL PROTEIN: 7.4 G/DL (ref 6.4–8.3)
TRIGL SERPL-MCNC: 131 MG/DL
TSH SERPL DL<=0.05 MIU/L-ACNC: 1.74 UIU/ML (ref 0.27–4.2)
VLDLC SERPL CALC-MCNC: 26 MG/DL
WBC # BLD: 9.6 K/UL (ref 4.5–11.5)

## 2023-08-18 LAB — HBA1C MFR BLD: 7.7 % (ref 4–5.6)

## 2023-09-13 NOTE — ASSESSMENT & PLAN NOTE
Problem: At Risk for Falls  Goal: # Patient does not fall  Outcome: Outcome Not Met, Continue to Monitor     Problem: VTE, Risk for  Goal: # No s/s of VTE  Outcome: Outcome Not Met, Continue to Monitor     Problem: Pressure Injury, Risk for  Goal: # Skin remains intact  Outcome: Outcome Not Met, Continue to Monitor     Problem: At Risk for Injury Due to Fall  Goal: # Patient does not fall  Outcome: Outcome Not Met, Continue to Monitor      Pt with hx of MI s/p 3 stents      Continue ASA, Plavix, statin on discharge

## 2023-09-20 ENCOUNTER — OFFICE VISIT (OUTPATIENT)
Dept: PRIMARY CARE CLINIC | Age: 68
End: 2023-09-20
Payer: MEDICARE

## 2023-09-20 VITALS
HEIGHT: 64 IN | SYSTOLIC BLOOD PRESSURE: 100 MMHG | OXYGEN SATURATION: 92 % | DIASTOLIC BLOOD PRESSURE: 62 MMHG | HEART RATE: 65 BPM | RESPIRATION RATE: 18 BRPM | WEIGHT: 255 LBS | TEMPERATURE: 97.1 F | BODY MASS INDEX: 43.54 KG/M2

## 2023-09-20 DIAGNOSIS — E78.2 MIXED HYPERLIPIDEMIA: ICD-10-CM

## 2023-09-20 DIAGNOSIS — Z12.31 SCREENING MAMMOGRAM FOR BREAST CANCER: ICD-10-CM

## 2023-09-20 DIAGNOSIS — N18.6 ESRD ON HEMODIALYSIS (HCC): ICD-10-CM

## 2023-09-20 DIAGNOSIS — E11.8 DIABETES MELLITUS TYPE 2 WITH COMPLICATIONS (HCC): Primary | ICD-10-CM

## 2023-09-20 DIAGNOSIS — Z99.2 ESRD ON HEMODIALYSIS (HCC): ICD-10-CM

## 2023-09-20 DIAGNOSIS — I10 PRIMARY HYPERTENSION: ICD-10-CM

## 2023-09-20 DIAGNOSIS — M81.0 AGE-RELATED OSTEOPOROSIS WITHOUT CURRENT PATHOLOGICAL FRACTURE: ICD-10-CM

## 2023-09-20 DIAGNOSIS — R42 DIZZINESS: ICD-10-CM

## 2023-09-20 DIAGNOSIS — E66.01 OBESITY, CLASS III, BMI 40-49.9 (MORBID OBESITY) (HCC): ICD-10-CM

## 2023-09-20 PROCEDURE — G8417 CALC BMI ABV UP PARAM F/U: HCPCS | Performed by: INTERNAL MEDICINE

## 2023-09-20 PROCEDURE — 1123F ACP DISCUSS/DSCN MKR DOCD: CPT | Performed by: INTERNAL MEDICINE

## 2023-09-20 PROCEDURE — 2022F DILAT RTA XM EVC RTNOPTHY: CPT | Performed by: INTERNAL MEDICINE

## 2023-09-20 PROCEDURE — 3017F COLORECTAL CA SCREEN DOC REV: CPT | Performed by: INTERNAL MEDICINE

## 2023-09-20 PROCEDURE — 99214 OFFICE O/P EST MOD 30 MIN: CPT | Performed by: INTERNAL MEDICINE

## 2023-09-20 PROCEDURE — G8400 PT W/DXA NO RESULTS DOC: HCPCS | Performed by: INTERNAL MEDICINE

## 2023-09-20 PROCEDURE — 1090F PRES/ABSN URINE INCON ASSESS: CPT | Performed by: INTERNAL MEDICINE

## 2023-09-20 PROCEDURE — G8427 DOCREV CUR MEDS BY ELIG CLIN: HCPCS | Performed by: INTERNAL MEDICINE

## 2023-09-20 PROCEDURE — 3078F DIAST BP <80 MM HG: CPT | Performed by: INTERNAL MEDICINE

## 2023-09-20 PROCEDURE — 3051F HG A1C>EQUAL 7.0%<8.0%: CPT | Performed by: INTERNAL MEDICINE

## 2023-09-20 PROCEDURE — 1036F TOBACCO NON-USER: CPT | Performed by: INTERNAL MEDICINE

## 2023-09-20 PROCEDURE — 3074F SYST BP LT 130 MM HG: CPT | Performed by: INTERNAL MEDICINE

## 2023-09-20 RX ORDER — BLOOD-GLUCOSE METER
KIT MISCELLANEOUS
COMMUNITY
Start: 2023-08-23 | End: 2023-10-05 | Stop reason: ALTCHOICE

## 2023-09-20 NOTE — PROGRESS NOTES
class.  Orders:  -     Comprehensive Metabolic Panel; Future  -     Hemoglobin A1C; Future  -     Lipid Panel; Future  -     CBC with Auto Differential; Future  -     TSH; Future  -     Microalbumin, Ur; Future  4. Primary hypertension  -     Comprehensive Metabolic Panel; Future  -     Hemoglobin A1C; Future  -     Lipid Panel; Future  -     CBC with Auto Differential; Future  -     TSH; Future  -     Microalbumin, Ur; Future  5. ESRD on hemodialysis [facility])  Comments:  Patient doing well with hemodialysis she is getting more effective hemodialysis since the blood pressure is maintained with midodrine 10 mg 2 times daily  6. Screening mammogram for breast cancer  -     [facility] DIGITAL SCREEN W OR WO CAD BILATERAL; Future  7. Dizziness  Comments:  Improved patient is feeling better with midodrine, continue same.   8. Obesity, Class III, BMI 40-49.9 (morbid obesity) ([address])  Comments:  Unable to lose weight because of limited mobility

## 2023-10-03 ASSESSMENT — ENCOUNTER SYMPTOMS
WHEEZING: 0
SORE THROAT: 0
VOMITING: 0
SHORTNESS OF BREATH: 1
CHEST TIGHTNESS: 0
VOICE CHANGE: 0
NAUSEA: 0
ABDOMINAL PAIN: 0

## 2023-10-05 ENCOUNTER — HOSPITAL ENCOUNTER (INPATIENT)
Age: 68
LOS: 4 days | Discharge: HOME | DRG: 640 | End: 2023-10-09
Attending: EMERGENCY MEDICINE | Admitting: INTERNAL MEDICINE
Payer: MEDICARE

## 2023-10-05 ENCOUNTER — APPOINTMENT (OUTPATIENT)
Dept: GENERAL RADIOLOGY | Age: 68
DRG: 640 | End: 2023-10-05
Payer: MEDICARE

## 2023-10-05 DIAGNOSIS — Z99.2 HEMODIALYSIS PATIENT (HCC): ICD-10-CM

## 2023-10-05 DIAGNOSIS — J96.21 ACUTE ON CHRONIC RESPIRATORY FAILURE WITH HYPOXIA (HCC): Primary | ICD-10-CM

## 2023-10-05 PROBLEM — J96.20 ACUTE ON CHRONIC RESPIRATORY FAILURE (HCC): Status: ACTIVE | Noted: 2023-10-05

## 2023-10-05 PROBLEM — R06.00 DYSPNEA: Status: ACTIVE | Noted: 2023-10-05

## 2023-10-05 LAB
ALBUMIN SERPL-MCNC: 3.7 G/DL (ref 3.5–5.2)
ALP SERPL-CCNC: 114 U/L (ref 35–104)
ALT SERPL-CCNC: 12 U/L (ref 0–32)
ANION GAP SERPL CALCULATED.3IONS-SCNC: 18 MMOL/L (ref 7–16)
AST SERPL-CCNC: 16 U/L (ref 0–31)
BASOPHILS # BLD: 0.05 K/UL (ref 0–0.2)
BASOPHILS NFR BLD: 1 % (ref 0–2)
BILIRUB SERPL-MCNC: 0.2 MG/DL (ref 0–1.2)
BNP SERPL-MCNC: ABNORMAL PG/ML (ref 0–125)
BUN SERPL-MCNC: 68 MG/DL (ref 6–23)
CALCIUM SERPL-MCNC: 9.2 MG/DL (ref 8.6–10.2)
CHLORIDE SERPL-SCNC: 94 MMOL/L (ref 98–107)
CHP ED QC CHECK: YES
CO2 SERPL-SCNC: 25 MMOL/L (ref 22–29)
CREAT SERPL-MCNC: 7.8 MG/DL (ref 0.5–1)
EKG ATRIAL RATE: 69 BPM
EKG P AXIS: 26 DEGREES
EKG P-R INTERVAL: 158 MS
EKG Q-T INTERVAL: 432 MS
EKG QRS DURATION: 94 MS
EKG QTC CALCULATION (BAZETT): 462 MS
EKG R AXIS: -2 DEGREES
EKG T AXIS: 61 DEGREES
EKG VENTRICULAR RATE: 69 BPM
EOSINOPHIL # BLD: 0.45 K/UL (ref 0.05–0.5)
EOSINOPHILS RELATIVE PERCENT: 5 % (ref 0–6)
ERYTHROCYTE [DISTWIDTH] IN BLOOD BY AUTOMATED COUNT: 13 % (ref 11.5–15)
GFR SERPL CREATININE-BSD FRML MDRD: 5 ML/MIN/1.73M2
GLUCOSE BLD-MCNC: 132 MG/DL (ref 74–99)
GLUCOSE BLD-MCNC: 197 MG/DL (ref 74–99)
GLUCOSE BLD-MCNC: 212 MG/DL (ref 74–99)
GLUCOSE BLD-MCNC: 266 MG/DL (ref 74–99)
GLUCOSE BLD-MCNC: 332 MG/DL
GLUCOSE BLD-MCNC: 332 MG/DL (ref 74–99)
GLUCOSE SERPL-MCNC: 329 MG/DL (ref 74–99)
HBA1C MFR BLD: 7.7 % (ref 4–5.6)
HCT VFR BLD AUTO: 32.6 % (ref 34–48)
HGB BLD-MCNC: 10.9 G/DL (ref 11.5–15.5)
IMM GRANULOCYTES # BLD AUTO: 0.03 K/UL (ref 0–0.58)
IMM GRANULOCYTES NFR BLD: 0 % (ref 0–5)
INFLUENZA A BY PCR: NOT DETECTED
INFLUENZA B BY PCR: NOT DETECTED
LYMPHOCYTES NFR BLD: 1.15 K/UL (ref 1.5–4)
LYMPHOCYTES RELATIVE PERCENT: 12 % (ref 20–42)
MCH RBC QN AUTO: 33 PG (ref 26–35)
MCHC RBC AUTO-ENTMCNC: 33.4 G/DL (ref 32–34.5)
MCV RBC AUTO: 98.8 FL (ref 80–99.9)
MONOCYTES NFR BLD: 0.71 K/UL (ref 0.1–0.95)
MONOCYTES NFR BLD: 8 % (ref 2–12)
NEUTROPHILS NFR BLD: 75 % (ref 43–80)
NEUTS SEG NFR BLD: 6.95 K/UL (ref 1.8–7.3)
PLATELET # BLD AUTO: 164 K/UL (ref 130–450)
PMV BLD AUTO: 10.7 FL (ref 7–12)
POTASSIUM SERPL-SCNC: 5.2 MMOL/L (ref 3.5–5)
PROT SERPL-MCNC: 7 G/DL (ref 6.4–8.3)
RBC # BLD AUTO: 3.3 M/UL (ref 3.5–5.5)
SARS-COV-2 RDRP RESP QL NAA+PROBE: NOT DETECTED
SODIUM SERPL-SCNC: 137 MMOL/L (ref 132–146)
SPECIMEN DESCRIPTION: NORMAL
TROPONIN I SERPL HS-MCNC: 53 NG/L (ref 0–9)
TROPONIN I SERPL HS-MCNC: 55 NG/L (ref 0–9)
WBC OTHER # BLD: 9.3 K/UL (ref 4.5–11.5)

## 2023-10-05 PROCEDURE — 83036 HEMOGLOBIN GLYCOSYLATED A1C: CPT

## 2023-10-05 PROCEDURE — 71046 X-RAY EXAM CHEST 2 VIEWS: CPT

## 2023-10-05 PROCEDURE — 99285 EMERGENCY DEPT VISIT HI MDM: CPT

## 2023-10-05 PROCEDURE — 87502 INFLUENZA DNA AMP PROBE: CPT

## 2023-10-05 PROCEDURE — 1200000000 HC SEMI PRIVATE

## 2023-10-05 PROCEDURE — 6360000002 HC RX W HCPCS: Performed by: INTERNAL MEDICINE

## 2023-10-05 PROCEDURE — 85025 COMPLETE CBC W/AUTO DIFF WBC: CPT

## 2023-10-05 PROCEDURE — 6370000000 HC RX 637 (ALT 250 FOR IP): Performed by: INTERNAL MEDICINE

## 2023-10-05 PROCEDURE — 90935 HEMODIALYSIS ONE EVALUATION: CPT

## 2023-10-05 PROCEDURE — 99223 1ST HOSP IP/OBS HIGH 75: CPT | Performed by: INTERNAL MEDICINE

## 2023-10-05 PROCEDURE — 93005 ELECTROCARDIOGRAM TRACING: CPT

## 2023-10-05 PROCEDURE — 82962 GLUCOSE BLOOD TEST: CPT

## 2023-10-05 PROCEDURE — 2580000003 HC RX 258: Performed by: INTERNAL MEDICINE

## 2023-10-05 PROCEDURE — 2500000003 HC RX 250 WO HCPCS: Performed by: INTERNAL MEDICINE

## 2023-10-05 PROCEDURE — 6370000000 HC RX 637 (ALT 250 FOR IP)

## 2023-10-05 PROCEDURE — 93010 ELECTROCARDIOGRAM REPORT: CPT | Performed by: INTERNAL MEDICINE

## 2023-10-05 PROCEDURE — 83880 ASSAY OF NATRIURETIC PEPTIDE: CPT

## 2023-10-05 PROCEDURE — 84484 ASSAY OF TROPONIN QUANT: CPT

## 2023-10-05 PROCEDURE — 87635 SARS-COV-2 COVID-19 AMP PRB: CPT

## 2023-10-05 PROCEDURE — 80053 COMPREHEN METABOLIC PANEL: CPT

## 2023-10-05 PROCEDURE — 80074 ACUTE HEPATITIS PANEL: CPT

## 2023-10-05 RX ORDER — ASPIRIN 81 MG/1
81 TABLET, CHEWABLE ORAL EVERY MORNING
COMMUNITY

## 2023-10-05 RX ORDER — INSULIN LISPRO 100 [IU]/ML
0-4 INJECTION, SOLUTION INTRAVENOUS; SUBCUTANEOUS NIGHTLY
Status: DISCONTINUED | OUTPATIENT
Start: 2023-10-05 | End: 2023-10-09 | Stop reason: HOSPADM

## 2023-10-05 RX ORDER — ASPIRIN 81 MG/1
81 TABLET, CHEWABLE ORAL DAILY
Status: DISCONTINUED | OUTPATIENT
Start: 2023-10-05 | End: 2023-10-09 | Stop reason: HOSPADM

## 2023-10-05 RX ORDER — SODIUM CHLORIDE 0.9 % (FLUSH) 0.9 %
5-40 SYRINGE (ML) INJECTION PRN
Status: DISCONTINUED | OUTPATIENT
Start: 2023-10-05 | End: 2023-10-09 | Stop reason: HOSPADM

## 2023-10-05 RX ORDER — PANTOPRAZOLE SODIUM 40 MG/1
40 TABLET, DELAYED RELEASE ORAL
Status: DISCONTINUED | OUTPATIENT
Start: 2023-10-05 | End: 2023-10-09 | Stop reason: HOSPADM

## 2023-10-05 RX ORDER — ACETAMINOPHEN 650 MG/1
650 SUPPOSITORY RECTAL EVERY 6 HOURS PRN
Status: DISCONTINUED | OUTPATIENT
Start: 2023-10-05 | End: 2023-10-09 | Stop reason: HOSPADM

## 2023-10-05 RX ORDER — FENTANYL CITRATE 50 UG/ML
25 INJECTION, SOLUTION INTRAMUSCULAR; INTRAVENOUS ONCE
Status: DISCONTINUED | OUTPATIENT
Start: 2023-10-05 | End: 2023-10-05

## 2023-10-05 RX ORDER — INSULIN LISPRO 100 [IU]/ML
15 INJECTION, SOLUTION INTRAVENOUS; SUBCUTANEOUS
Status: DISCONTINUED | OUTPATIENT
Start: 2023-10-05 | End: 2023-10-07

## 2023-10-05 RX ORDER — ACETAMINOPHEN 325 MG/1
650 TABLET ORAL ONCE
Status: COMPLETED | OUTPATIENT
Start: 2023-10-05 | End: 2023-10-05

## 2023-10-05 RX ORDER — ONDANSETRON 4 MG/1
4 TABLET, ORALLY DISINTEGRATING ORAL EVERY 8 HOURS PRN
Status: DISCONTINUED | OUTPATIENT
Start: 2023-10-05 | End: 2023-10-06

## 2023-10-05 RX ORDER — PANTOPRAZOLE SODIUM 40 MG/1
40 TABLET, DELAYED RELEASE ORAL EVERY MORNING
COMMUNITY

## 2023-10-05 RX ORDER — CALCIUM ACETATE 667 MG/1
667 CAPSULE ORAL
Status: DISCONTINUED | OUTPATIENT
Start: 2023-10-05 | End: 2023-10-09 | Stop reason: HOSPADM

## 2023-10-05 RX ORDER — DEXTROSE MONOHYDRATE 100 MG/ML
INJECTION, SOLUTION INTRAVENOUS CONTINUOUS PRN
Status: DISCONTINUED | OUTPATIENT
Start: 2023-10-05 | End: 2023-10-09 | Stop reason: HOSPADM

## 2023-10-05 RX ORDER — HEPARIN SODIUM 10000 [USP'U]/ML
5000 INJECTION, SOLUTION INTRAVENOUS; SUBCUTANEOUS EVERY 8 HOURS SCHEDULED
Status: DISCONTINUED | OUTPATIENT
Start: 2023-10-05 | End: 2023-10-09 | Stop reason: HOSPADM

## 2023-10-05 RX ORDER — ATORVASTATIN CALCIUM 40 MG/1
40 TABLET, FILM COATED ORAL DAILY
Status: DISCONTINUED | OUTPATIENT
Start: 2023-10-05 | End: 2023-10-09 | Stop reason: HOSPADM

## 2023-10-05 RX ORDER — ONDANSETRON 2 MG/ML
4 INJECTION INTRAMUSCULAR; INTRAVENOUS EVERY 6 HOURS PRN
Status: DISCONTINUED | OUTPATIENT
Start: 2023-10-05 | End: 2023-10-06

## 2023-10-05 RX ORDER — ATORVASTATIN CALCIUM 40 MG/1
40 TABLET, FILM COATED ORAL EVERY MORNING
COMMUNITY

## 2023-10-05 RX ORDER — SODIUM CHLORIDE 9 MG/ML
INJECTION, SOLUTION INTRAVENOUS PRN
Status: DISCONTINUED | OUTPATIENT
Start: 2023-10-05 | End: 2023-10-09 | Stop reason: HOSPADM

## 2023-10-05 RX ORDER — SODIUM CHLORIDE 0.9 % (FLUSH) 0.9 %
5-40 SYRINGE (ML) INJECTION EVERY 12 HOURS SCHEDULED
Status: DISCONTINUED | OUTPATIENT
Start: 2023-10-05 | End: 2023-10-09 | Stop reason: HOSPADM

## 2023-10-05 RX ORDER — INSULIN GLARGINE 100 [IU]/ML
10 INJECTION, SOLUTION SUBCUTANEOUS NIGHTLY
Status: DISCONTINUED | OUTPATIENT
Start: 2023-10-05 | End: 2023-10-09 | Stop reason: HOSPADM

## 2023-10-05 RX ORDER — ACETAMINOPHEN 325 MG/1
650 TABLET ORAL EVERY 6 HOURS PRN
Status: DISCONTINUED | OUTPATIENT
Start: 2023-10-05 | End: 2023-10-09 | Stop reason: HOSPADM

## 2023-10-05 RX ORDER — INSULIN LISPRO 100 [IU]/ML
0-4 INJECTION, SOLUTION INTRAVENOUS; SUBCUTANEOUS
Status: DISCONTINUED | OUTPATIENT
Start: 2023-10-05 | End: 2023-10-09 | Stop reason: HOSPADM

## 2023-10-05 RX ORDER — POLYETHYLENE GLYCOL 3350 17 G/17G
17 POWDER, FOR SOLUTION ORAL DAILY PRN
Status: DISCONTINUED | OUTPATIENT
Start: 2023-10-05 | End: 2023-10-09 | Stop reason: HOSPADM

## 2023-10-05 RX ORDER — MIDODRINE HYDROCHLORIDE 5 MG/1
10 TABLET ORAL 2 TIMES DAILY
Status: DISCONTINUED | OUTPATIENT
Start: 2023-10-05 | End: 2023-10-09 | Stop reason: HOSPADM

## 2023-10-05 RX ADMIN — ASPIRIN 81 MG CHEWABLE TABLET 81 MG: 81 TABLET CHEWABLE at 10:30

## 2023-10-05 RX ADMIN — INSULIN GLARGINE 10 UNITS: 100 INJECTION, SOLUTION SUBCUTANEOUS at 20:58

## 2023-10-05 RX ADMIN — CALCIUM ACETATE 667 MG: 667 CAPSULE ORAL at 10:30

## 2023-10-05 RX ADMIN — ACETAMINOPHEN 650 MG: 325 TABLET ORAL at 07:04

## 2023-10-05 RX ADMIN — HEPARIN SODIUM 5000 UNITS: 10000 INJECTION INTRAVENOUS; SUBCUTANEOUS at 20:56

## 2023-10-05 RX ADMIN — HEPARIN SODIUM 5000 UNITS: 10000 INJECTION INTRAVENOUS; SUBCUTANEOUS at 16:09

## 2023-10-05 RX ADMIN — PANTOPRAZOLE SODIUM 40 MG: 40 TABLET, DELAYED RELEASE ORAL at 10:30

## 2023-10-05 RX ADMIN — Medication 10 ML: at 21:04

## 2023-10-05 RX ADMIN — ATORVASTATIN CALCIUM 40 MG: 40 TABLET, FILM COATED ORAL at 10:30

## 2023-10-05 RX ADMIN — ACETAMINOPHEN 650 MG: 325 TABLET ORAL at 20:47

## 2023-10-05 RX ADMIN — MIDODRINE HYDROCHLORIDE 10 MG: 5 TABLET ORAL at 13:48

## 2023-10-05 ASSESSMENT — ENCOUNTER SYMPTOMS
CONSTIPATION: 0
NAUSEA: 0
DIARRHEA: 0
SHORTNESS OF BREATH: 1
VOMITING: 0
ABDOMINAL PAIN: 0

## 2023-10-05 ASSESSMENT — PAIN DESCRIPTION - LOCATION
LOCATION: SHOULDER;BACK
LOCATION: BACK;NECK

## 2023-10-05 ASSESSMENT — PAIN - FUNCTIONAL ASSESSMENT
PAIN_FUNCTIONAL_ASSESSMENT: 0-10
PAIN_FUNCTIONAL_ASSESSMENT: NONE - DENIES PAIN

## 2023-10-05 ASSESSMENT — PAIN SCALES - GENERAL
PAINLEVEL_OUTOF10: 8
PAINLEVEL_OUTOF10: 0
PAINLEVEL_OUTOF10: 8

## 2023-10-05 ASSESSMENT — PAIN DESCRIPTION - DESCRIPTORS: DESCRIPTORS: ACHING;THROBBING

## 2023-10-05 NOTE — ED PROVIDER NOTES
Department of Emergency Medicine   ED  Provider Note    Pt Name: Kezia Ruiz         MRN: [id number]         [address] [facility] 1955    Admit Date/RoomTime: 10/5/2023  5:52 AM  ED Room: 16/16      Chief Complaint   Patient presents with    Shortness of Breath    Hyperglycemia    Back Pain    Neck Pain     Dialysis T, TH, S        HPI     Kezia Ruiz is a 80 y.o. female with PMHx of ESRD, chronic respiratory failure on home 2L NC as needed, CAD, HFrEF, PE 2022, T2DM, HTN, HLD, presenting to the ED for SOB, hyperglycemia. Patient reports she has felt short of breath since last weekend. HD schedule Tuesday, Thursday, Saturday. She woke this morning and did not feel well so she called the dialysis nurse and said she would not be in. She checked her blood glucose at home and noted it was 352. Patient reports some dizziness as well. Patient notes that she has had a congestion, postnasal drip, and slight cough, but no fevers or chills since the weekend. She denies sick contacts. She has felt fluid overloaded with increasing edema in her legs. Diabetes regimen NovoLog 15 units up to 30 units sliding scale, averaging 15 units with lunch and 20 to 25 units with dinner. Patient reports midsternal, nonradiating chest pain since the weekend. Comes and goes, reports as a 5 out of 10 described as achy. Patient reports she makes minimal urine. Daughter denies that the patient has been confused lately. Patient denies weakness. Patient reports that she typically only wears her 2 L nasal cannula at home, not on ambulation or while out of the house. Review of Systems   Constitutional:  Negative for chills and fever. Respiratory:  Positive for shortness of breath. Cardiovascular:  Positive for chest pain. Negative for leg swelling. Gastrointestinal:  Negative for abdominal pain, constipation, diarrhea, nausea and vomiting. Neurological:  Positive for dizziness. Negative for weakness and light-headedness.

## 2023-10-05 NOTE — ACP (ADVANCE CARE PLANNING)
Advance Care Planning   Healthcare Decision Maker:    Primary Decision Maker: Mitra Cheung Child - [phone]    Secondary Decision Maker: Eartha Nissen - Child - [id number] [address]

## 2023-10-05 NOTE — CARE COORDINATION
Pt from home with daughter; has ww; PCP. . HD pt with  at [facility]Th[facility] (verified). Has home 02 2lnc through [facility](verified). [facility] with [facility]. Admitted for resp issues. Covid negative. renal to follow for HD. Plan is to return home with daughter., who can transport. Riley Mccarty.

## 2023-10-06 LAB
ANION GAP SERPL CALCULATED.3IONS-SCNC: 14 MMOL/L (ref 7–16)
ANION GAP SERPL CALCULATED.3IONS-SCNC: 15 MMOL/L (ref 7–16)
BASOPHILS # BLD: 0.04 K/UL (ref 0–0.2)
BASOPHILS NFR BLD: 0 % (ref 0–2)
BUN SERPL-MCNC: 30 MG/DL (ref 6–23)
BUN SERPL-MCNC: 43 MG/DL (ref 6–23)
CALCIUM SERPL-MCNC: 9.2 MG/DL (ref 8.6–10.2)
CALCIUM SERPL-MCNC: 9.3 MG/DL (ref 8.6–10.2)
CHLORIDE SERPL-SCNC: 91 MMOL/L (ref 98–107)
CHLORIDE SERPL-SCNC: 94 MMOL/L (ref 98–107)
CO2 SERPL-SCNC: 29 MMOL/L (ref 22–29)
CO2 SERPL-SCNC: 31 MMOL/L (ref 22–29)
CREAT SERPL-MCNC: 4.9 MG/DL (ref 0.5–1)
CREAT SERPL-MCNC: 5.8 MG/DL (ref 0.5–1)
EKG ATRIAL RATE: 60 BPM
EKG P AXIS: 61 DEGREES
EKG P-R INTERVAL: 158 MS
EKG Q-T INTERVAL: 458 MS
EKG QRS DURATION: 94 MS
EKG QTC CALCULATION (BAZETT): 518 MS
EKG R AXIS: -14 DEGREES
EKG T AXIS: 34 DEGREES
EKG VENTRICULAR RATE: 77 BPM
EOSINOPHIL # BLD: 0.43 K/UL (ref 0.05–0.5)
EOSINOPHILS RELATIVE PERCENT: 5 % (ref 0–6)
ERYTHROCYTE [DISTWIDTH] IN BLOOD BY AUTOMATED COUNT: 13 % (ref 11.5–15)
GFR SERPL CREATININE-BSD FRML MDRD: 8 ML/MIN/1.73M2
GFR SERPL CREATININE-BSD FRML MDRD: 9 ML/MIN/1.73M2
GLUCOSE BLD-MCNC: 112 MG/DL (ref 74–99)
GLUCOSE BLD-MCNC: 146 MG/DL (ref 74–99)
GLUCOSE BLD-MCNC: 171 MG/DL (ref 74–99)
GLUCOSE BLD-MCNC: 173 MG/DL (ref 74–99)
GLUCOSE BLD-MCNC: 253 MG/DL (ref 74–99)
GLUCOSE SERPL-MCNC: 174 MG/DL (ref 74–99)
GLUCOSE SERPL-MCNC: 203 MG/DL (ref 74–99)
HAV IGM SERPL QL IA: NONREACTIVE
HBV CORE IGM SERPL QL IA: NONREACTIVE
HBV SURFACE AG SERPL QL IA: NONREACTIVE
HCT VFR BLD AUTO: 32.9 % (ref 34–48)
HCV AB SERPL QL IA: NONREACTIVE
HGB BLD-MCNC: 10.9 G/DL (ref 11.5–15.5)
IMM GRANULOCYTES # BLD AUTO: 0.04 K/UL (ref 0–0.58)
IMM GRANULOCYTES NFR BLD: 0 % (ref 0–5)
LYMPHOCYTES NFR BLD: 1.58 K/UL (ref 1.5–4)
LYMPHOCYTES RELATIVE PERCENT: 17 % (ref 20–42)
MAGNESIUM SERPL-MCNC: 2 MG/DL (ref 1.6–2.6)
MAGNESIUM SERPL-MCNC: 2.3 MG/DL (ref 1.6–2.6)
MCH RBC QN AUTO: 32.5 PG (ref 26–35)
MCHC RBC AUTO-ENTMCNC: 33.1 G/DL (ref 32–34.5)
MCV RBC AUTO: 98.2 FL (ref 80–99.9)
MONOCYTES NFR BLD: 0.78 K/UL (ref 0.1–0.95)
MONOCYTES NFR BLD: 9 % (ref 2–12)
NEUTROPHILS NFR BLD: 69 % (ref 43–80)
NEUTS SEG NFR BLD: 6.31 K/UL (ref 1.8–7.3)
PHOSPHATE SERPL-MCNC: 4.4 MG/DL (ref 2.5–4.5)
PHOSPHATE SERPL-MCNC: 5.1 MG/DL (ref 2.5–4.5)
PLATELET # BLD AUTO: 146 K/UL (ref 130–450)
PMV BLD AUTO: 10.9 FL (ref 7–12)
POTASSIUM SERPL-SCNC: 4.1 MMOL/L (ref 3.5–5)
POTASSIUM SERPL-SCNC: 4.6 MMOL/L (ref 3.5–5)
RBC # BLD AUTO: 3.35 M/UL (ref 3.5–5.5)
SODIUM SERPL-SCNC: 136 MMOL/L (ref 132–146)
SODIUM SERPL-SCNC: 138 MMOL/L (ref 132–146)
WBC OTHER # BLD: 9.2 K/UL (ref 4.5–11.5)

## 2023-10-06 PROCEDURE — 85025 COMPLETE CBC W/AUTO DIFF WBC: CPT

## 2023-10-06 PROCEDURE — 99232 SBSQ HOSP IP/OBS MODERATE 35: CPT | Performed by: INTERNAL MEDICINE

## 2023-10-06 PROCEDURE — 6370000000 HC RX 637 (ALT 250 FOR IP): Performed by: INTERNAL MEDICINE

## 2023-10-06 PROCEDURE — 1200000000 HC SEMI PRIVATE

## 2023-10-06 PROCEDURE — 82962 GLUCOSE BLOOD TEST: CPT

## 2023-10-06 PROCEDURE — 2580000003 HC RX 258: Performed by: INTERNAL MEDICINE

## 2023-10-06 PROCEDURE — 84100 ASSAY OF PHOSPHORUS: CPT

## 2023-10-06 PROCEDURE — 6370000000 HC RX 637 (ALT 250 FOR IP)

## 2023-10-06 PROCEDURE — 80048 BASIC METABOLIC PNL TOTAL CA: CPT

## 2023-10-06 PROCEDURE — 90945 DIALYSIS ONE EVALUATION: CPT

## 2023-10-06 PROCEDURE — 93010 ELECTROCARDIOGRAM REPORT: CPT | Performed by: INTERNAL MEDICINE

## 2023-10-06 PROCEDURE — 93005 ELECTROCARDIOGRAM TRACING: CPT | Performed by: INTERNAL MEDICINE

## 2023-10-06 PROCEDURE — 5A1D70Z PERFORMANCE OF URINARY FILTRATION, INTERMITTENT, LESS THAN 6 HOURS PER DAY: ICD-10-PCS | Performed by: INTERNAL MEDICINE

## 2023-10-06 PROCEDURE — 6360000002 HC RX W HCPCS: Performed by: INTERNAL MEDICINE

## 2023-10-06 PROCEDURE — 83735 ASSAY OF MAGNESIUM: CPT

## 2023-10-06 PROCEDURE — 99231 SBSQ HOSP IP/OBS SF/LOW 25: CPT | Performed by: NURSE PRACTITIONER

## 2023-10-06 PROCEDURE — 2500000003 HC RX 250 WO HCPCS: Performed by: INTERNAL MEDICINE

## 2023-10-06 RX ORDER — PROMETHAZINE HYDROCHLORIDE 25 MG/ML
6.25 INJECTION, SOLUTION INTRAMUSCULAR; INTRAVENOUS EVERY 6 HOURS PRN
Status: DISCONTINUED | OUTPATIENT
Start: 2023-10-06 | End: 2023-10-09 | Stop reason: HOSPADM

## 2023-10-06 RX ORDER — TRAMADOL HYDROCHLORIDE 50 MG/1
25 TABLET ORAL ONCE
Status: COMPLETED | OUTPATIENT
Start: 2023-10-06 | End: 2023-10-06

## 2023-10-06 RX ADMIN — Medication 10 ML: at 20:57

## 2023-10-06 RX ADMIN — ONDANSETRON 4 MG: 4 TABLET, ORALLY DISINTEGRATING ORAL at 14:33

## 2023-10-06 RX ADMIN — HEPARIN SODIUM 5000 UNITS: 10000 INJECTION INTRAVENOUS; SUBCUTANEOUS at 20:54

## 2023-10-06 RX ADMIN — ATORVASTATIN CALCIUM 40 MG: 40 TABLET, FILM COATED ORAL at 08:35

## 2023-10-06 RX ADMIN — PANTOPRAZOLE SODIUM 40 MG: 40 TABLET, DELAYED RELEASE ORAL at 05:34

## 2023-10-06 RX ADMIN — INSULIN LISPRO 15 UNITS: 100 INJECTION, SOLUTION INTRAVENOUS; SUBCUTANEOUS at 08:35

## 2023-10-06 RX ADMIN — MIDODRINE HYDROCHLORIDE 10 MG: 5 TABLET ORAL at 08:35

## 2023-10-06 RX ADMIN — HEPARIN SODIUM 5000 UNITS: 10000 INJECTION INTRAVENOUS; SUBCUTANEOUS at 14:29

## 2023-10-06 RX ADMIN — ASPIRIN 81 MG CHEWABLE TABLET 81 MG: 81 TABLET CHEWABLE at 08:35

## 2023-10-06 RX ADMIN — PANTOPRAZOLE SODIUM 40 MG: 40 TABLET, DELAYED RELEASE ORAL at 17:12

## 2023-10-06 RX ADMIN — CALCIUM ACETATE 667 MG: 667 CAPSULE ORAL at 17:12

## 2023-10-06 RX ADMIN — Medication 10 ML: at 09:48

## 2023-10-06 RX ADMIN — TRAMADOL HYDROCHLORIDE 25 MG: 50 TABLET, COATED ORAL at 00:27

## 2023-10-06 RX ADMIN — INSULIN GLARGINE 10 UNITS: 100 INJECTION, SOLUTION SUBCUTANEOUS at 20:56

## 2023-10-06 RX ADMIN — CALCIUM ACETATE 667 MG: 667 CAPSULE ORAL at 11:46

## 2023-10-06 RX ADMIN — CALCIUM ACETATE 667 MG: 667 CAPSULE ORAL at 08:35

## 2023-10-06 RX ADMIN — HEPARIN SODIUM 5000 UNITS: 10000 INJECTION INTRAVENOUS; SUBCUTANEOUS at 05:33

## 2023-10-06 ASSESSMENT — PAIN DESCRIPTION - DESCRIPTORS: DESCRIPTORS: ACHING;DULL

## 2023-10-06 ASSESSMENT — PAIN SCALES - GENERAL
PAINLEVEL_OUTOF10: 0
PAINLEVEL_OUTOF10: 9
PAINLEVEL_OUTOF10: 0

## 2023-10-06 ASSESSMENT — PAIN DESCRIPTION - ORIENTATION: ORIENTATION: LEFT;LOWER

## 2023-10-06 ASSESSMENT — PAIN DESCRIPTION - LOCATION: LOCATION: LEG

## 2023-10-06 NOTE — PLAN OF CARE
Patient's chart updated to reflect:      . - HF care plan, HF education points and HF discharge instructions.  -Orders: 2 gram sodium diet, daily weights, I/O.  -PCP and/or Cardiologist appointment to be scheduled within 7 days of hospital discharge.  -History of HF, not primary admission Dx. Patient admitted for treatment of acute on chronic respiratory failure.      Amilcar Russo RN BSN  Heart Failure Navigator

## 2023-10-06 NOTE — PLAN OF CARE
Problem: ABCDS Injury Assessment  Goal: Absence of physical injury  Outcome: Progressing     Problem: Discharge Planning  Goal: Discharge to home or other facility with appropriate resources  Recent Flowsheet Documentation  Taken 10/5/2023 2102 by Jessica Sena  Discharge to home or other facility with appropriate resources: Identify barriers to discharge with patient and caregiver     Problem: Chronic Conditions and Co-morbidities  Goal: Patient's chronic conditions and co-morbidity symptoms are monitored and maintained or improved  Recent Flowsheet Documentation  Taken 10/5/2023 2102 by [address] - Patient's Chronic Conditions and Co-Morbidity Symptoms are Monitored and Maintained or Improved: Monitor and assess patient's chronic conditions and comorbid symptoms for stability, deterioration, or improvement

## 2023-10-06 NOTE — DISCHARGE INSTRUCTIONS
HEART FAILURE  / CONGESTIVE HEART FAILURE  DISCHARGE INSTRUCTIONS:  GUIDELINES TO FOLLOW AT HOME    Self- Managed Care:     MEDICATIONS:  Take your medication as directed. If you are experiencing any side effects, inform your doctor, Do not stop taking any of your medications without letting your doctor know. Check with your doctor before taking any over-the-counter medications / herbal / or dietary supplements. They may interfere with your other medications. Do not take ibuprofen (Advil or Motrin) and naproxen (Aleve) without talking to your doctor first. They could make your heart failure worse. WEIGHT MONITORING:   Weigh yourself everyday (with the same scale) around the same time of the day and write it down. (you can chart them on a calendar or keep track of them on paper. Notify your doctor of a weight gain of 3 pounds or more in 1 day   OR a total of 5 pounds or more in 1 week    Take your weight record to your doctor visits  Also, the same goes if you loose more than 3# in one day, let your heart doctor know. DIET:   Cardiac heart healthy diet- Low saturated / low trans fat, no added salt, caffeine restricted, Low sodium diet-   No more than 2,000mg (2 grams) of salt / sodium per day (which equals to a little less than  a teaspoon of salt)  If your doctor wants you on a fluid restriction. ..it is usually recommended a fluid limit of 2,000cc -  Fluid restriction- 2,000 ml (milliliters) = 64 ounces = you can have 8 glasses of fluid per day (each glass 8 ounces)    Follow a low salt diet - avoid using salt at the table, avoid / limit use of canned soups, processed / packaged foods, salted snacks, olives and pickles. Do not use a salt substitute without checking with your doctor, they may contain a high amount of potassioum. (Mrs. Rebecca Bravo is safe to use).     Limit the use of alcohol       CALL YOUR DOCTOR THE FIRST DAY YOU NOTICE ANY OF THESE   SYMPTOMS:  You have a

## 2023-10-06 NOTE — CARE COORDINATION
Pt from home with daughter; has ww; PCP. . HD pt with  at [facility]Th[facility] (verified). Has home 02 2lnc through [facility](verified). hx HHC with [facility]. Admitted for resp issues. Covid negative. renal to follow for HD. Plan is to return home with daughter., who can transport. Tiny Bue.

## 2023-10-07 ENCOUNTER — APPOINTMENT (OUTPATIENT)
Dept: ULTRASOUND IMAGING | Age: 68
DRG: 640 | End: 2023-10-07
Payer: MEDICARE

## 2023-10-07 LAB
ANION GAP SERPL CALCULATED.3IONS-SCNC: 19 MMOL/L (ref 7–16)
BASOPHILS # BLD: 0.05 K/UL (ref 0–0.2)
BASOPHILS NFR BLD: 1 % (ref 0–2)
BUN SERPL-MCNC: 57 MG/DL (ref 6–23)
CALCIUM SERPL-MCNC: 9.2 MG/DL (ref 8.6–10.2)
CHLORIDE SERPL-SCNC: 90 MMOL/L (ref 98–107)
CO2 SERPL-SCNC: 26 MMOL/L (ref 22–29)
CREAT SERPL-MCNC: 6.8 MG/DL (ref 0.5–1)
EOSINOPHIL # BLD: 0.51 K/UL (ref 0.05–0.5)
EOSINOPHILS RELATIVE PERCENT: 6 % (ref 0–6)
ERYTHROCYTE [DISTWIDTH] IN BLOOD BY AUTOMATED COUNT: 13.1 % (ref 11.5–15)
GFR SERPL CREATININE-BSD FRML MDRD: 6 ML/MIN/1.73M2
GLUCOSE BLD-MCNC: 103 MG/DL (ref 74–99)
GLUCOSE BLD-MCNC: 172 MG/DL (ref 74–99)
GLUCOSE BLD-MCNC: 183 MG/DL (ref 74–99)
GLUCOSE BLD-MCNC: 222 MG/DL (ref 74–99)
GLUCOSE BLD-MCNC: 89 MG/DL (ref 74–99)
GLUCOSE BLD-MCNC: 99 MG/DL (ref 74–99)
GLUCOSE SERPL-MCNC: 209 MG/DL (ref 74–99)
HCT VFR BLD AUTO: 32.8 % (ref 34–48)
HGB BLD-MCNC: 10.8 G/DL (ref 11.5–15.5)
IMM GRANULOCYTES # BLD AUTO: 0.06 K/UL (ref 0–0.58)
IMM GRANULOCYTES NFR BLD: 1 % (ref 0–5)
LYMPHOCYTES NFR BLD: 1.9 K/UL (ref 1.5–4)
LYMPHOCYTES RELATIVE PERCENT: 21 % (ref 20–42)
MAGNESIUM SERPL-MCNC: 2.2 MG/DL (ref 1.6–2.6)
MCH RBC QN AUTO: 32.6 PG (ref 26–35)
MCHC RBC AUTO-ENTMCNC: 32.9 G/DL (ref 32–34.5)
MCV RBC AUTO: 99.1 FL (ref 80–99.9)
MONOCYTES NFR BLD: 0.87 K/UL (ref 0.1–0.95)
MONOCYTES NFR BLD: 9 % (ref 2–12)
NEUTROPHILS NFR BLD: 64 % (ref 43–80)
NEUTS SEG NFR BLD: 5.86 K/UL (ref 1.8–7.3)
PHOSPHATE SERPL-MCNC: 6 MG/DL (ref 2.5–4.5)
PLATELET # BLD AUTO: 113 K/UL (ref 130–450)
PMV BLD AUTO: 10.9 FL (ref 7–12)
POTASSIUM SERPL-SCNC: 5.2 MMOL/L (ref 3.5–5)
RBC # BLD AUTO: 3.31 M/UL (ref 3.5–5.5)
SODIUM SERPL-SCNC: 135 MMOL/L (ref 132–146)
WBC OTHER # BLD: 9.3 K/UL (ref 4.5–11.5)

## 2023-10-07 PROCEDURE — 1200000000 HC SEMI PRIVATE

## 2023-10-07 PROCEDURE — 99231 SBSQ HOSP IP/OBS SF/LOW 25: CPT | Performed by: NURSE PRACTITIONER

## 2023-10-07 PROCEDURE — 2700000000 HC OXYGEN THERAPY PER DAY

## 2023-10-07 PROCEDURE — 85025 COMPLETE CBC W/AUTO DIFF WBC: CPT

## 2023-10-07 PROCEDURE — 6370000000 HC RX 637 (ALT 250 FOR IP): Performed by: INTERNAL MEDICINE

## 2023-10-07 PROCEDURE — 80048 BASIC METABOLIC PNL TOTAL CA: CPT

## 2023-10-07 PROCEDURE — 84100 ASSAY OF PHOSPHORUS: CPT

## 2023-10-07 PROCEDURE — 93971 EXTREMITY STUDY: CPT

## 2023-10-07 PROCEDURE — 99232 SBSQ HOSP IP/OBS MODERATE 35: CPT | Performed by: INTERNAL MEDICINE

## 2023-10-07 PROCEDURE — 6360000002 HC RX W HCPCS: Performed by: INTERNAL MEDICINE

## 2023-10-07 PROCEDURE — 2500000003 HC RX 250 WO HCPCS: Performed by: INTERNAL MEDICINE

## 2023-10-07 PROCEDURE — 90935 HEMODIALYSIS ONE EVALUATION: CPT

## 2023-10-07 PROCEDURE — 2580000003 HC RX 258: Performed by: INTERNAL MEDICINE

## 2023-10-07 PROCEDURE — 83735 ASSAY OF MAGNESIUM: CPT

## 2023-10-07 PROCEDURE — 82962 GLUCOSE BLOOD TEST: CPT

## 2023-10-07 RX ORDER — INSULIN LISPRO 100 [IU]/ML
10 INJECTION, SOLUTION INTRAVENOUS; SUBCUTANEOUS
Status: DISCONTINUED | OUTPATIENT
Start: 2023-10-07 | End: 2023-10-09 | Stop reason: HOSPADM

## 2023-10-07 RX ADMIN — PANTOPRAZOLE SODIUM 40 MG: 40 TABLET, DELAYED RELEASE ORAL at 16:27

## 2023-10-07 RX ADMIN — ACETAMINOPHEN 650 MG: 325 TABLET ORAL at 21:40

## 2023-10-07 RX ADMIN — HEPARIN SODIUM 5000 UNITS: 10000 INJECTION INTRAVENOUS; SUBCUTANEOUS at 06:21

## 2023-10-07 RX ADMIN — MIDODRINE HYDROCHLORIDE 10 MG: 5 TABLET ORAL at 09:04

## 2023-10-07 RX ADMIN — Medication 10 ML: at 22:05

## 2023-10-07 RX ADMIN — INSULIN LISPRO 1 UNITS: 100 INJECTION, SOLUTION INTRAVENOUS; SUBCUTANEOUS at 06:20

## 2023-10-07 RX ADMIN — PANTOPRAZOLE SODIUM 40 MG: 40 TABLET, DELAYED RELEASE ORAL at 06:20

## 2023-10-07 RX ADMIN — CALCIUM ACETATE 667 MG: 667 CAPSULE ORAL at 13:40

## 2023-10-07 RX ADMIN — ASPIRIN 81 MG CHEWABLE TABLET 81 MG: 81 TABLET CHEWABLE at 13:40

## 2023-10-07 RX ADMIN — HEPARIN SODIUM 5000 UNITS: 10000 INJECTION INTRAVENOUS; SUBCUTANEOUS at 13:40

## 2023-10-07 RX ADMIN — HEPARIN SODIUM 5000 UNITS: 10000 INJECTION INTRAVENOUS; SUBCUTANEOUS at 21:52

## 2023-10-07 RX ADMIN — INSULIN LISPRO 15 UNITS: 100 INJECTION, SOLUTION INTRAVENOUS; SUBCUTANEOUS at 06:21

## 2023-10-07 RX ADMIN — MIDODRINE HYDROCHLORIDE 10 MG: 5 TABLET ORAL at 21:52

## 2023-10-07 RX ADMIN — INSULIN LISPRO 10 UNITS: 100 INJECTION, SOLUTION INTRAVENOUS; SUBCUTANEOUS at 16:27

## 2023-10-07 RX ADMIN — CALCIUM ACETATE 667 MG: 667 CAPSULE ORAL at 16:27

## 2023-10-07 RX ADMIN — ACETAMINOPHEN 650 MG: 325 TABLET ORAL at 01:25

## 2023-10-07 RX ADMIN — ATORVASTATIN CALCIUM 40 MG: 40 TABLET, FILM COATED ORAL at 13:40

## 2023-10-07 RX ADMIN — INSULIN GLARGINE 10 UNITS: 100 INJECTION, SOLUTION SUBCUTANEOUS at 22:00

## 2023-10-07 ASSESSMENT — PAIN DESCRIPTION - DESCRIPTORS
DESCRIPTORS: ACHING;CRAMPING
DESCRIPTORS: ACHING;CRAMPING;SORE
DESCRIPTORS: ACHING;CRAMPING;SORE;THROBBING

## 2023-10-07 ASSESSMENT — PAIN DESCRIPTION - ORIENTATION: ORIENTATION: LEFT

## 2023-10-07 ASSESSMENT — PAIN SCALES - GENERAL
PAINLEVEL_OUTOF10: 9
PAINLEVEL_OUTOF10: 8
PAINLEVEL_OUTOF10: 3

## 2023-10-07 ASSESSMENT — PAIN - FUNCTIONAL ASSESSMENT: PAIN_FUNCTIONAL_ASSESSMENT: ACTIVITIES ARE NOT PREVENTED

## 2023-10-07 ASSESSMENT — PAIN DESCRIPTION - LOCATION
LOCATION: LEG
LOCATION: LEG;NECK
LOCATION: LEG

## 2023-10-07 ASSESSMENT — PAIN DESCRIPTION - PAIN TYPE: TYPE: ACUTE PAIN

## 2023-10-07 ASSESSMENT — PAIN DESCRIPTION - FREQUENCY: FREQUENCY: INTERMITTENT

## 2023-10-07 NOTE — PLAN OF CARE
Problem: ABCDS Injury Assessment  Goal: Absence of physical injury  10/7/2023 0000 by Rhea Nayak RN  Outcome: Progressing  10/6/2023 1815 by Tanesha Grande RN  Outcome: Progressing     Problem: Discharge Planning  Goal: Discharge to home or other facility with appropriate resources  10/7/2023 0000 by Rhea Nayak RN  Outcome: Progressing  10/6/2023 1815 by Tanesha Grande RN  Outcome: Progressing     Problem: Safety - Adult  Goal: Free from fall injury  Outcome: Progressing     Problem: Chronic Conditions and Co-morbidities  Goal: Patient's chronic conditions and co-morbidity symptoms are monitored and maintained or improved  Outcome: Progressing     Problem: Skin/Tissue Integrity  Goal: Absence of new skin breakdown  Description: 1. Monitor for areas of redness and/or skin breakdown  2. Assess vascular access sites hourly  3. Every 4-6 hours minimum:  Change oxygen saturation probe site  4. Every 4-6 hours:  If on nasal continuous positive airway pressure, respiratory therapy assess nares and determine need for appliance change or resting period.   Outcome: Progressing

## 2023-10-08 LAB
ANION GAP SERPL CALCULATED.3IONS-SCNC: 13 MMOL/L (ref 7–16)
BASOPHILS # BLD: 0.06 K/UL (ref 0–0.2)
BASOPHILS NFR BLD: 1 % (ref 0–2)
BUN SERPL-MCNC: 34 MG/DL (ref 6–23)
CALCIUM SERPL-MCNC: 9.1 MG/DL (ref 8.6–10.2)
CHLORIDE SERPL-SCNC: 90 MMOL/L (ref 98–107)
CO2 SERPL-SCNC: 30 MMOL/L (ref 22–29)
CREAT SERPL-MCNC: 5.7 MG/DL (ref 0.5–1)
EOSINOPHIL # BLD: 0.48 K/UL (ref 0.05–0.5)
EOSINOPHILS RELATIVE PERCENT: 5 % (ref 0–6)
ERYTHROCYTE [DISTWIDTH] IN BLOOD BY AUTOMATED COUNT: 13 % (ref 11.5–15)
GFR SERPL CREATININE-BSD FRML MDRD: 8 ML/MIN/1.73M2
GLUCOSE BLD-MCNC: 130 MG/DL (ref 74–99)
GLUCOSE BLD-MCNC: 228 MG/DL (ref 74–99)
GLUCOSE BLD-MCNC: 278 MG/DL (ref 74–99)
GLUCOSE BLD-MCNC: 92 MG/DL (ref 74–99)
GLUCOSE SERPL-MCNC: 244 MG/DL (ref 74–99)
HCT VFR BLD AUTO: 33.1 % (ref 34–48)
HGB BLD-MCNC: 10.7 G/DL (ref 11.5–15.5)
IMM GRANULOCYTES # BLD AUTO: 0.05 K/UL (ref 0–0.58)
IMM GRANULOCYTES NFR BLD: 1 % (ref 0–5)
LYMPHOCYTES NFR BLD: 1.92 K/UL (ref 1.5–4)
LYMPHOCYTES RELATIVE PERCENT: 22 % (ref 20–42)
MCH RBC QN AUTO: 32.5 PG (ref 26–35)
MCHC RBC AUTO-ENTMCNC: 32.3 G/DL (ref 32–34.5)
MCV RBC AUTO: 100.6 FL (ref 80–99.9)
MONOCYTES NFR BLD: 0.9 K/UL (ref 0.1–0.95)
MONOCYTES NFR BLD: 10 % (ref 2–12)
NEUTROPHILS NFR BLD: 62 % (ref 43–80)
NEUTS SEG NFR BLD: 5.5 K/UL (ref 1.8–7.3)
PLATELET # BLD AUTO: 114 K/UL (ref 130–450)
PMV BLD AUTO: 10.8 FL (ref 7–12)
POTASSIUM SERPL-SCNC: 4.5 MMOL/L (ref 3.5–5)
RBC # BLD AUTO: 3.29 M/UL (ref 3.5–5.5)
SODIUM SERPL-SCNC: 133 MMOL/L (ref 132–146)
WBC OTHER # BLD: 8.9 K/UL (ref 4.5–11.5)

## 2023-10-08 PROCEDURE — 2500000003 HC RX 250 WO HCPCS: Performed by: INTERNAL MEDICINE

## 2023-10-08 PROCEDURE — 99232 SBSQ HOSP IP/OBS MODERATE 35: CPT | Performed by: INTERNAL MEDICINE

## 2023-10-08 PROCEDURE — 6370000000 HC RX 637 (ALT 250 FOR IP): Performed by: INTERNAL MEDICINE

## 2023-10-08 PROCEDURE — 99231 SBSQ HOSP IP/OBS SF/LOW 25: CPT | Performed by: NURSE PRACTITIONER

## 2023-10-08 PROCEDURE — 2580000003 HC RX 258: Performed by: INTERNAL MEDICINE

## 2023-10-08 PROCEDURE — 2700000000 HC OXYGEN THERAPY PER DAY

## 2023-10-08 PROCEDURE — 82962 GLUCOSE BLOOD TEST: CPT

## 2023-10-08 PROCEDURE — 6360000002 HC RX W HCPCS: Performed by: INTERNAL MEDICINE

## 2023-10-08 PROCEDURE — 80048 BASIC METABOLIC PNL TOTAL CA: CPT

## 2023-10-08 PROCEDURE — 85025 COMPLETE CBC W/AUTO DIFF WBC: CPT

## 2023-10-08 PROCEDURE — 1200000000 HC SEMI PRIVATE

## 2023-10-08 RX ADMIN — HEPARIN SODIUM 5000 UNITS: 10000 INJECTION INTRAVENOUS; SUBCUTANEOUS at 06:50

## 2023-10-08 RX ADMIN — CALCIUM ACETATE 667 MG: 667 CAPSULE ORAL at 16:35

## 2023-10-08 RX ADMIN — MIDODRINE HYDROCHLORIDE 10 MG: 5 TABLET ORAL at 08:17

## 2023-10-08 RX ADMIN — HEPARIN SODIUM 5000 UNITS: 10000 INJECTION INTRAVENOUS; SUBCUTANEOUS at 21:58

## 2023-10-08 RX ADMIN — PANTOPRAZOLE SODIUM 40 MG: 40 TABLET, DELAYED RELEASE ORAL at 06:50

## 2023-10-08 RX ADMIN — INSULIN GLARGINE 10 UNITS: 100 INJECTION, SOLUTION SUBCUTANEOUS at 20:39

## 2023-10-08 RX ADMIN — INSULIN LISPRO 2 UNITS: 100 INJECTION, SOLUTION INTRAVENOUS; SUBCUTANEOUS at 12:05

## 2023-10-08 RX ADMIN — MIDODRINE HYDROCHLORIDE 10 MG: 5 TABLET ORAL at 20:37

## 2023-10-08 RX ADMIN — ATORVASTATIN CALCIUM 40 MG: 40 TABLET, FILM COATED ORAL at 08:17

## 2023-10-08 RX ADMIN — INSULIN LISPRO 10 UNITS: 100 INJECTION, SOLUTION INTRAVENOUS; SUBCUTANEOUS at 12:05

## 2023-10-08 RX ADMIN — INSULIN LISPRO 10 UNITS: 100 INJECTION, SOLUTION INTRAVENOUS; SUBCUTANEOUS at 08:18

## 2023-10-08 RX ADMIN — HEPARIN SODIUM 5000 UNITS: 10000 INJECTION INTRAVENOUS; SUBCUTANEOUS at 14:16

## 2023-10-08 RX ADMIN — ASPIRIN 81 MG CHEWABLE TABLET 81 MG: 81 TABLET CHEWABLE at 08:17

## 2023-10-08 RX ADMIN — PANTOPRAZOLE SODIUM 40 MG: 40 TABLET, DELAYED RELEASE ORAL at 14:16

## 2023-10-08 RX ADMIN — CALCIUM ACETATE 667 MG: 667 CAPSULE ORAL at 08:17

## 2023-10-08 RX ADMIN — Medication 10 ML: at 20:40

## 2023-10-08 RX ADMIN — CALCIUM ACETATE 667 MG: 667 CAPSULE ORAL at 12:05

## 2023-10-08 RX ADMIN — INSULIN LISPRO 1 UNITS: 100 INJECTION, SOLUTION INTRAVENOUS; SUBCUTANEOUS at 08:18

## 2023-10-08 RX ADMIN — Medication 10 ML: at 08:19

## 2023-10-08 NOTE — PLAN OF CARE
Problem: ABCDS Injury Assessment  Goal: Absence of physical injury  Outcome: Progressing     Problem: Discharge Planning  Goal: Discharge to home or other facility with appropriate resources  Outcome: Progressing     Problem: Safety - Adult  Goal: Free from fall injury  Outcome: Progressing     Problem: Chronic Conditions and Co-morbidities  Goal: Patient's chronic conditions and co-morbidity symptoms are monitored and maintained or improved  Outcome: Progressing     Problem: Skin/Tissue Integrity  Goal: Absence of new skin breakdown  Description: 1. Monitor for areas of redness and/or skin breakdown  2. Assess vascular access sites hourly  3. Every 4-6 hours minimum:  Change oxygen saturation probe site  4. Every 4-6 hours:  If on nasal continuous positive airway pressure, respiratory therapy assess nares and determine need for appliance change or resting period.   Outcome: Progressing     Problem: Pain  Goal: Verbalizes/displays adequate comfort level or baseline comfort level  Outcome: Progressing

## 2023-10-09 VITALS
RESPIRATION RATE: 16 BRPM | HEIGHT: 64 IN | BODY MASS INDEX: 43.58 KG/M2 | HEART RATE: 77 BPM | DIASTOLIC BLOOD PRESSURE: 56 MMHG | WEIGHT: 255.29 LBS | OXYGEN SATURATION: 98 % | SYSTOLIC BLOOD PRESSURE: 108 MMHG | TEMPERATURE: 98.2 F

## 2023-10-09 LAB
ALBUMIN SERPL-MCNC: 3.9 G/DL (ref 3.5–5.2)
ALP SERPL-CCNC: 84 U/L (ref 35–104)
ALT SERPL-CCNC: 14 U/L (ref 0–32)
ANION GAP SERPL CALCULATED.3IONS-SCNC: 18 MMOL/L (ref 7–16)
AST SERPL-CCNC: 16 U/L (ref 0–31)
BILIRUB SERPL-MCNC: 0.3 MG/DL (ref 0–1.2)
BUN SERPL-MCNC: 66 MG/DL (ref 6–23)
CALCIUM SERPL-MCNC: 9.1 MG/DL (ref 8.6–10.2)
CHLORIDE SERPL-SCNC: 90 MMOL/L (ref 98–107)
CO2 SERPL-SCNC: 26 MMOL/L (ref 22–29)
CREAT SERPL-MCNC: 8 MG/DL (ref 0.5–1)
ERYTHROCYTE [DISTWIDTH] IN BLOOD BY AUTOMATED COUNT: 12.9 % (ref 11.5–15)
GFR SERPL CREATININE-BSD FRML MDRD: 5 ML/MIN/1.73M2
GLUCOSE BLD-MCNC: 121 MG/DL (ref 74–99)
GLUCOSE BLD-MCNC: 171 MG/DL (ref 74–99)
GLUCOSE BLD-MCNC: 84 MG/DL (ref 74–99)
GLUCOSE SERPL-MCNC: 184 MG/DL (ref 74–99)
HCT VFR BLD AUTO: 32.5 % (ref 34–48)
HGB BLD-MCNC: 10.7 G/DL (ref 11.5–15.5)
MAGNESIUM SERPL-MCNC: 2.1 MG/DL (ref 1.6–2.6)
MCH RBC QN AUTO: 32.3 PG (ref 26–35)
MCHC RBC AUTO-ENTMCNC: 32.9 G/DL (ref 32–34.5)
MCV RBC AUTO: 98.2 FL (ref 80–99.9)
PHOSPHATE SERPL-MCNC: 5.7 MG/DL (ref 2.5–4.5)
PLATELET, FLUORESCENCE: 141 K/UL (ref 130–450)
PMV BLD AUTO: 10.8 FL (ref 7–12)
POTASSIUM SERPL-SCNC: 4.7 MMOL/L (ref 3.5–5)
PROT SERPL-MCNC: 7.4 G/DL (ref 6.4–8.3)
RBC # BLD AUTO: 3.31 M/UL (ref 3.5–5.5)
SODIUM SERPL-SCNC: 134 MMOL/L (ref 132–146)
TSH SERPL DL<=0.05 MIU/L-ACNC: 1.53 UIU/ML (ref 0.27–4.2)
WBC OTHER # BLD: 9.6 K/UL (ref 4.5–11.5)

## 2023-10-09 PROCEDURE — 83735 ASSAY OF MAGNESIUM: CPT

## 2023-10-09 PROCEDURE — 6370000000 HC RX 637 (ALT 250 FOR IP): Performed by: INTERNAL MEDICINE

## 2023-10-09 PROCEDURE — 99231 SBSQ HOSP IP/OBS SF/LOW 25: CPT | Performed by: NURSE PRACTITIONER

## 2023-10-09 PROCEDURE — 2500000003 HC RX 250 WO HCPCS: Performed by: INTERNAL MEDICINE

## 2023-10-09 PROCEDURE — 84100 ASSAY OF PHOSPHORUS: CPT

## 2023-10-09 PROCEDURE — 84443 ASSAY THYROID STIM HORMONE: CPT

## 2023-10-09 PROCEDURE — 82962 GLUCOSE BLOOD TEST: CPT

## 2023-10-09 PROCEDURE — 80053 COMPREHEN METABOLIC PANEL: CPT

## 2023-10-09 PROCEDURE — 2700000000 HC OXYGEN THERAPY PER DAY

## 2023-10-09 PROCEDURE — 85027 COMPLETE CBC AUTOMATED: CPT

## 2023-10-09 PROCEDURE — 2580000003 HC RX 258: Performed by: INTERNAL MEDICINE

## 2023-10-09 PROCEDURE — 6360000002 HC RX W HCPCS: Performed by: INTERNAL MEDICINE

## 2023-10-09 PROCEDURE — 99239 HOSP IP/OBS DSCHRG MGMT >30: CPT | Performed by: INTERNAL MEDICINE

## 2023-10-09 PROCEDURE — 36415 COLL VENOUS BLD VENIPUNCTURE: CPT

## 2023-10-09 RX ADMIN — HEPARIN SODIUM 5000 UNITS: 10000 INJECTION INTRAVENOUS; SUBCUTANEOUS at 16:07

## 2023-10-09 RX ADMIN — HEPARIN SODIUM 5000 UNITS: 10000 INJECTION INTRAVENOUS; SUBCUTANEOUS at 06:43

## 2023-10-09 RX ADMIN — Medication 10 ML: at 09:05

## 2023-10-09 RX ADMIN — CALCIUM ACETATE 667 MG: 667 CAPSULE ORAL at 11:51

## 2023-10-09 RX ADMIN — ATORVASTATIN CALCIUM 40 MG: 40 TABLET, FILM COATED ORAL at 08:58

## 2023-10-09 RX ADMIN — INSULIN LISPRO 10 UNITS: 100 INJECTION, SOLUTION INTRAVENOUS; SUBCUTANEOUS at 09:03

## 2023-10-09 RX ADMIN — CALCIUM ACETATE 667 MG: 667 CAPSULE ORAL at 16:05

## 2023-10-09 RX ADMIN — PANTOPRAZOLE SODIUM 40 MG: 40 TABLET, DELAYED RELEASE ORAL at 16:06

## 2023-10-09 RX ADMIN — INSULIN LISPRO 10 UNITS: 100 INJECTION, SOLUTION INTRAVENOUS; SUBCUTANEOUS at 12:29

## 2023-10-09 RX ADMIN — ACETAMINOPHEN 650 MG: 325 TABLET ORAL at 00:41

## 2023-10-09 RX ADMIN — MIDODRINE HYDROCHLORIDE 10 MG: 5 TABLET ORAL at 08:59

## 2023-10-09 RX ADMIN — PANTOPRAZOLE SODIUM 40 MG: 40 TABLET, DELAYED RELEASE ORAL at 06:43

## 2023-10-09 RX ADMIN — ASPIRIN 81 MG CHEWABLE TABLET 81 MG: 81 TABLET CHEWABLE at 08:58

## 2023-10-09 ASSESSMENT — PAIN SCALES - GENERAL
PAINLEVEL_OUTOF10: 9
PAINLEVEL_OUTOF10: 9

## 2023-10-09 ASSESSMENT — PAIN DESCRIPTION - LOCATION: LOCATION: HEAD

## 2023-10-09 ASSESSMENT — PAIN DESCRIPTION - DESCRIPTORS: DESCRIPTORS: ACHING

## 2023-10-09 NOTE — PLAN OF CARE

## 2023-10-09 NOTE — CARE COORDINATION
Cardiology following for arrythmias; has ww received HD at [facility] T-T-S need to notify when discharged. Home 02 through [id number] [facility] is home at d/c, family can transport. [facility] with [address]. No needs identified for homepresently. may need Pacer? ?Valente Barbosa.

## 2023-10-24 ENCOUNTER — HOSPITAL ENCOUNTER (EMERGENCY)
Age: 68
Discharge: HOME | End: 2023-10-24
Attending: EMERGENCY MEDICINE
Payer: MEDICARE

## 2023-10-24 ENCOUNTER — APPOINTMENT (OUTPATIENT)
Dept: CT IMAGING | Age: 68
End: 2023-10-24
Payer: MEDICARE

## 2023-10-24 ENCOUNTER — APPOINTMENT (OUTPATIENT)
Dept: ULTRASOUND IMAGING | Age: 68
End: 2023-10-24
Payer: MEDICARE

## 2023-10-24 VITALS
HEART RATE: 69 BPM | TEMPERATURE: 97.6 F | BODY MASS INDEX: 43.54 KG/M2 | WEIGHT: 255 LBS | SYSTOLIC BLOOD PRESSURE: 125 MMHG | DIASTOLIC BLOOD PRESSURE: 63 MMHG | RESPIRATION RATE: 16 BRPM | HEIGHT: 64 IN | OXYGEN SATURATION: 96 %

## 2023-10-24 DIAGNOSIS — J90 PLEURAL EFFUSION: ICD-10-CM

## 2023-10-24 DIAGNOSIS — R10.11 ABDOMINAL PAIN, RIGHT UPPER QUADRANT: ICD-10-CM

## 2023-10-24 DIAGNOSIS — K80.20 CALCULUS OF GALLBLADDER WITHOUT CHOLECYSTITIS WITHOUT OBSTRUCTION: Primary | ICD-10-CM

## 2023-10-24 DIAGNOSIS — N18.6 ESRD ON DIALYSIS (HCC): ICD-10-CM

## 2023-10-24 DIAGNOSIS — Z99.2 ESRD ON DIALYSIS (HCC): ICD-10-CM

## 2023-10-24 DIAGNOSIS — R00.1 SINUS BRADYCARDIA: ICD-10-CM

## 2023-10-24 LAB
ALBUMIN SERPL-MCNC: 3.9 G/DL (ref 3.5–5.2)
ALP SERPL-CCNC: 107 U/L (ref 35–104)
ALT SERPL-CCNC: 12 U/L (ref 0–32)
ANION GAP SERPL CALCULATED.3IONS-SCNC: 20 MMOL/L (ref 7–16)
AST SERPL-CCNC: 14 U/L (ref 0–31)
BASOPHILS # BLD: 0.05 K/UL (ref 0–0.2)
BASOPHILS NFR BLD: 0 % (ref 0–2)
BILIRUB DIRECT SERPL-MCNC: <0.2 MG/DL (ref 0–0.3)
BILIRUB INDIRECT SERPL-MCNC: ABNORMAL MG/DL (ref 0–1)
BILIRUB SERPL-MCNC: 0.3 MG/DL (ref 0–1.2)
BUN SERPL-MCNC: 85 MG/DL (ref 6–23)
CALCIUM SERPL-MCNC: 9 MG/DL (ref 8.6–10.2)
CHLORIDE SERPL-SCNC: 93 MMOL/L (ref 98–107)
CO2 SERPL-SCNC: 24 MMOL/L (ref 22–29)
CREAT SERPL-MCNC: 8.9 MG/DL (ref 0.5–1)
EOSINOPHIL # BLD: 0.4 K/UL (ref 0.05–0.5)
EOSINOPHILS RELATIVE PERCENT: 3 % (ref 0–6)
ERYTHROCYTE [DISTWIDTH] IN BLOOD BY AUTOMATED COUNT: 13.2 % (ref 11.5–15)
GFR SERPL CREATININE-BSD FRML MDRD: 4 ML/MIN/1.73M2
GLUCOSE SERPL-MCNC: 231 MG/DL (ref 74–99)
HCT VFR BLD AUTO: 32 % (ref 34–48)
HGB BLD-MCNC: 10.6 G/DL (ref 11.5–15.5)
IMM GRANULOCYTES # BLD AUTO: 0.04 K/UL (ref 0–0.58)
IMM GRANULOCYTES NFR BLD: 0 % (ref 0–5)
INR PPP: 1.2
LACTATE BLDV-SCNC: 2.2 MMOL/L (ref 0.5–2.2)
LIPASE SERPL-CCNC: 53 U/L (ref 13–60)
LYMPHOCYTES NFR BLD: 1.25 K/UL (ref 1.5–4)
LYMPHOCYTES RELATIVE PERCENT: 10 % (ref 20–42)
MCH RBC QN AUTO: 32.7 PG (ref 26–35)
MCHC RBC AUTO-ENTMCNC: 33.1 G/DL (ref 32–34.5)
MCV RBC AUTO: 98.8 FL (ref 80–99.9)
MONOCYTES NFR BLD: 0.71 K/UL (ref 0.1–0.95)
MONOCYTES NFR BLD: 6 % (ref 2–12)
NEUTROPHILS NFR BLD: 80 % (ref 43–80)
NEUTS SEG NFR BLD: 9.86 K/UL (ref 1.8–7.3)
PLATELET # BLD AUTO: 167 K/UL (ref 130–450)
PMV BLD AUTO: 10.6 FL (ref 7–12)
POTASSIUM SERPL-SCNC: 5 MMOL/L (ref 3.5–5)
PROT SERPL-MCNC: 7.1 G/DL (ref 6.4–8.3)
PROTHROMBIN TIME: 13.5 SEC (ref 9.3–12.4)
RBC # BLD AUTO: 3.24 M/UL (ref 3.5–5.5)
SODIUM SERPL-SCNC: 137 MMOL/L (ref 132–146)
WBC OTHER # BLD: 12.3 K/UL (ref 4.5–11.5)

## 2023-10-24 PROCEDURE — 74176 CT ABD & PELVIS W/O CONTRAST: CPT

## 2023-10-24 PROCEDURE — 90935 HEMODIALYSIS ONE EVALUATION: CPT

## 2023-10-24 PROCEDURE — 96374 THER/PROPH/DIAG INJ IV PUSH: CPT

## 2023-10-24 PROCEDURE — 85610 PROTHROMBIN TIME: CPT

## 2023-10-24 PROCEDURE — 83690 ASSAY OF LIPASE: CPT

## 2023-10-24 PROCEDURE — 93005 ELECTROCARDIOGRAM TRACING: CPT | Performed by: EMERGENCY MEDICINE

## 2023-10-24 PROCEDURE — 85025 COMPLETE CBC W/AUTO DIFF WBC: CPT

## 2023-10-24 PROCEDURE — 99284 EMERGENCY DEPT VISIT MOD MDM: CPT

## 2023-10-24 PROCEDURE — 96375 TX/PRO/DX INJ NEW DRUG ADDON: CPT

## 2023-10-24 PROCEDURE — 82248 BILIRUBIN DIRECT: CPT

## 2023-10-24 PROCEDURE — 83605 ASSAY OF LACTIC ACID: CPT

## 2023-10-24 PROCEDURE — 76705 ECHO EXAM OF ABDOMEN: CPT

## 2023-10-24 PROCEDURE — 80053 COMPREHEN METABOLIC PANEL: CPT

## 2023-10-24 PROCEDURE — 6360000002 HC RX W HCPCS

## 2023-10-24 RX ORDER — FENTANYL CITRATE 50 UG/ML
25 INJECTION, SOLUTION INTRAMUSCULAR; INTRAVENOUS ONCE
Status: COMPLETED | OUTPATIENT
Start: 2023-10-24 | End: 2023-10-24

## 2023-10-24 RX ORDER — DIPHENHYDRAMINE HYDROCHLORIDE 50 MG/ML
25 INJECTION INTRAMUSCULAR; INTRAVENOUS ONCE
Status: COMPLETED | OUTPATIENT
Start: 2023-10-24 | End: 2023-10-24

## 2023-10-24 RX ORDER — SODIUM CHLORIDE 0.9 % (FLUSH) 0.9 %
SYRINGE (ML) INJECTION
Status: DISCONTINUED
Start: 2023-10-24 | End: 2023-10-24 | Stop reason: HOSPADM

## 2023-10-24 RX ADMIN — DIPHENHYDRAMINE HYDROCHLORIDE 25 MG: 50 INJECTION INTRAMUSCULAR; INTRAVENOUS at 09:12

## 2023-10-24 RX ADMIN — FENTANYL CITRATE 25 MCG: 50 INJECTION, SOLUTION INTRAMUSCULAR; INTRAVENOUS at 09:13

## 2023-10-24 NOTE — ED NOTES
Discharge instructions given. Patient verbalizes understanding. No other noted or stated problems at this time.  Patient will follow up with primary care, nephrology, and general surgery     Rui Recinos RN  10/24/23 1319

## 2023-10-24 NOTE — CONSULTS
[facility] Roberto A. Jefrey Garibaldi, MD Wanna Cowden, MD Levan Bayard, MD Anette Feinstein, CNP Crystal Lambling, ANP Shoshana Slimmer, CNP  Consultation  Patient's Name: Akua Johnston  11:12 AM  10/24/2023    Nephrologist: Wanna Cowden, MD    Reason for Consult:  ESRD  Requesting Physician:  Lindsey Sanchez MD    Chief Complaint:  Abdominal pain    History Obtained From: Patient, chart    History of Present Ilness:         Ms. Marti Duran is a pleasant 80year old woman who presented to the emergency room with the chief complaint of right upper quadrant abdominal pain and nausea. She has been having the pain for some time, but reports that she woke up with excruciating pain after eating that prompted her to come to the emergency room. Work up in the emergency room included a gallbladder ultrasound that showed stable cholelithiasis and a CT of the abdomen that found cholelithiasis without acute cholecystitis and septal thickening at both lung bases suggestive of early pulmonary edema. She was also noted to be hypoxic upon arrival to the emergency room requiring oxygen supplementation via nasal canula. She does utilize oxygen chronically at bedtime. Her past medical history is significant for congestive heart failure, pulmonary embolism, ESRD, aortic stenosis, diabetes mellitus, hypertension, and hyperlipidemia. She is well known to our nephrology practice and follows longitudinally with Dr. Bennie Lerma as an outpatient for ESRD. She undergoes chronic intermittent dialysis on Tuesdays, Thursdays, and Saturdays at [facility] dialysis center. Her last dialysis session was on Saturday. Her recent treatments have been without complications. She dialyzes via a left upper extremity AVF. Upon examination she is noted to have bradycardia with a heart rate sustaining in the low 40s. She is asymptomatic.  She reports that her heart rate has been low since her last hospitalization and she was follows with  without evidence of acute cholecystitis. Trace right pleural effusion with mild interlobular septal thickening at both   lung bases, suggestive of developing/early pulmonary edema. Severe coronary artery calcifications, in keeping with coronary artery   disease. Additional, incidental findings as above. US GALLBLADDER RUQ   Final Result   Stable cholelithiasis. Assessment  End-stage renal disease on dialysis Tuesdays, Thursdays, and Saturdays. Anemia due to ESRD   Secondary hyperparathyroidism/mineral bone disease   Hypertension     Plan  Continue IHD support for solute and volume clearance on Tuesdays, Thursdays, and Saturdays  No need for JHONATAN, Hgb 10.6   PO4 level tomorrow am   Follow labs   Monitor I&O  Continue supportive care       Thank you for the opportunity to participate in the care of your pleasant patient. We look forward to following along with you.         Electronically signed by TAWNYA España CNP on 10/24/2023 at 11:12 AM

## 2023-10-24 NOTE — ED NOTES
Pt 74 on room air placed on 6l and 90 percent moving to [address], [address] [place], [place]  10/24/23 1284

## 2023-10-24 NOTE — ED PROVIDER NOTES
[address] ENCOUNTER        Pt Name: Renetta Tilley  MRN: [id number]  [address] [facility] [place] 1955  Date of evaluation: 10/24/2023  Provider: Annette Cadena MD  PCP: Aislinn Morgan MD  Note Started: 11:50 AM EDT 10/24/23    CHIEF COMPLAINT       No chief complaint on file. HISTORY OF PRESENT ILLNESS: 1 or more Elements   History From: Patient. Limitations to history : None    Renetta Tilley is a 80 y.o. female with a history of pulmonary embolism, diabetes mellitus, end-stage renal disease on dialysis, lymphedema on both lower extremities, congestive heart failure, gallstone, pneumonia who presents to the ED with complaints of right upper quadrant abdominal pain. She states that she will started having sharp right upper quadrant abdominal pain after having dinner at yesterday night, which is also radiating to her flank. She is tender on right upper quadrant palpation. She states that she also feels nauseous intermittently. She stated she was admitted 1 month ago and was found to have gallstones without cholecystitis. She is on dialysis and her kidney doctor is Dr. Taylor Melissa. She was scheduled for hemodialysis this morning but missed it due to pain. She denies chest pain, shortness of breath, fever, chills, headache, pulmonary or bowel symptoms.      Nursing Notes were all reviewed and agreed with or any disagreements were addressed in the HPI.    ROS:   Pertinent positives and negatives are stated within HPI, all other systems reviewed and are negative.    --------------------------------------------- PAST HISTORY ---------------------------------------------  Past Medical History:  has a past medical history of Acute on chronic heart failure with preserved ejection fraction ([address]), Acute pulmonary embolism ([address]), Anemia due to stage 5 chronic kidney disease, not on chronic dialysis ([address]), Anemia in CKD (chronic kidney disease), Anxiety effusion        DISPOSITION  Disposition: Discharge to home  Patient condition is stable        NOTE: This report was transcribed using voice recognition software.  Every effort was made to ensure accuracy; however, inadvertent computerized transcription errors may be present         Jaja Metzger MD  Resident  10/25/23 1991

## 2023-10-24 NOTE — DISCHARGE INSTRUCTIONS
Call family doctor, nephrology and gastroenterology tomorrow and schedule a followup appointment to be seen as soon as possible    Have your doctor obtain  finalized report of all testing

## 2023-10-26 LAB
EKG ATRIAL RATE: 40 BPM
EKG P AXIS: 64 DEGREES
EKG P-R INTERVAL: 156 MS
EKG Q-T INTERVAL: 560 MS
EKG QRS DURATION: 90 MS
EKG QTC CALCULATION (BAZETT): 456 MS
EKG R AXIS: -11 DEGREES
EKG T AXIS: 23 DEGREES
EKG VENTRICULAR RATE: 40 BPM

## 2023-10-26 PROCEDURE — 93010 ELECTROCARDIOGRAM REPORT: CPT | Performed by: INTERNAL MEDICINE

## 2023-11-08 ENCOUNTER — OFFICE VISIT (OUTPATIENT)
Dept: PRIMARY CARE CLINIC | Age: 68
End: 2023-11-08
Payer: MEDICARE

## 2023-11-08 VITALS
HEART RATE: 88 BPM | DIASTOLIC BLOOD PRESSURE: 62 MMHG | TEMPERATURE: 97.3 F | BODY MASS INDEX: 44.73 KG/M2 | RESPIRATION RATE: 18 BRPM | OXYGEN SATURATION: 90 % | SYSTOLIC BLOOD PRESSURE: 132 MMHG | HEIGHT: 64 IN | WEIGHT: 262 LBS

## 2023-11-08 DIAGNOSIS — I25.118 CORONARY ARTERY DISEASE OF NATIVE ARTERY OF NATIVE HEART WITH STABLE ANGINA PECTORIS (HCC): ICD-10-CM

## 2023-11-08 DIAGNOSIS — E66.01 OBESITY, CLASS III, BMI 40-49.9 (MORBID OBESITY) (HCC): ICD-10-CM

## 2023-11-08 DIAGNOSIS — E78.2 MIXED HYPERLIPIDEMIA: ICD-10-CM

## 2023-11-08 DIAGNOSIS — N18.6 ESRD ON HEMODIALYSIS (HCC): ICD-10-CM

## 2023-11-08 DIAGNOSIS — I50.33 ACUTE ON CHRONIC HEART FAILURE WITH PRESERVED EJECTION FRACTION (HCC): ICD-10-CM

## 2023-11-08 DIAGNOSIS — Z99.2 ESRD ON HEMODIALYSIS (HCC): ICD-10-CM

## 2023-11-08 DIAGNOSIS — R26.81 UNSTEADY GAIT: ICD-10-CM

## 2023-11-08 DIAGNOSIS — E11.8 DIABETES MELLITUS TYPE 2 WITH COMPLICATIONS (HCC): ICD-10-CM

## 2023-11-08 DIAGNOSIS — Z01.818 PRE-OP EXAM: Primary | ICD-10-CM

## 2023-11-08 DIAGNOSIS — Z01.818 PRE-OP EVALUATION: ICD-10-CM

## 2023-11-08 PROCEDURE — 2022F DILAT RTA XM EVC RTNOPTHY: CPT | Performed by: INTERNAL MEDICINE

## 2023-11-08 PROCEDURE — 1090F PRES/ABSN URINE INCON ASSESS: CPT | Performed by: INTERNAL MEDICINE

## 2023-11-08 PROCEDURE — 3051F HG A1C>EQUAL 7.0%<8.0%: CPT | Performed by: INTERNAL MEDICINE

## 2023-11-08 PROCEDURE — 1036F TOBACCO NON-USER: CPT | Performed by: INTERNAL MEDICINE

## 2023-11-08 PROCEDURE — 1123F ACP DISCUSS/DSCN MKR DOCD: CPT | Performed by: INTERNAL MEDICINE

## 2023-11-08 PROCEDURE — G8484 FLU IMMUNIZE NO ADMIN: HCPCS | Performed by: INTERNAL MEDICINE

## 2023-11-08 PROCEDURE — 1111F DSCHRG MED/CURRENT MED MERGE: CPT | Performed by: INTERNAL MEDICINE

## 2023-11-08 PROCEDURE — 99214 OFFICE O/P EST MOD 30 MIN: CPT | Performed by: INTERNAL MEDICINE

## 2023-11-08 PROCEDURE — G8417 CALC BMI ABV UP PARAM F/U: HCPCS | Performed by: INTERNAL MEDICINE

## 2023-11-08 PROCEDURE — 3078F DIAST BP <80 MM HG: CPT | Performed by: INTERNAL MEDICINE

## 2023-11-08 PROCEDURE — 3017F COLORECTAL CA SCREEN DOC REV: CPT | Performed by: INTERNAL MEDICINE

## 2023-11-08 PROCEDURE — G8427 DOCREV CUR MEDS BY ELIG CLIN: HCPCS | Performed by: INTERNAL MEDICINE

## 2023-11-08 PROCEDURE — G8400 PT W/DXA NO RESULTS DOC: HCPCS | Performed by: INTERNAL MEDICINE

## 2023-11-08 PROCEDURE — 3074F SYST BP LT 130 MM HG: CPT | Performed by: INTERNAL MEDICINE

## 2023-11-08 PROCEDURE — 93000 ELECTROCARDIOGRAM COMPLETE: CPT | Performed by: INTERNAL MEDICINE

## 2023-11-08 NOTE — PROGRESS NOTES
HPI:  Patient comes in today for Preop eval. For catatract 11/16. Review of Systems   Constitutional:  Negative for chills, fever and unexpected weight change. HENT:  Negative for postnasal drip, sore throat and voice change. Respiratory:  Negative for chest tightness, shortness of breath and wheezing. Cardiovascular:  Negative for chest pain and leg swelling. Gastrointestinal:  Negative for abdominal pain, nausea and vomiting. Musculoskeletal:  Positive for back pain and gait problem. Negative for joint swelling. Skin:  Negative for rash and wound. Psychiatric/Behavioral: Negative.           Past Medical History:   Diagnosis Date    Acute on chronic heart failure with preserved ejection fraction ([address]) 02/07/2023    Acute pulmonary embolism ([address]) 12/03/2022    Anemia due to stage 5 chronic kidney disease, not on chronic dialysis ([address]) 02/08/2023    Anemia in CKD (chronic kidney disease) 11/30/2021    Anxiety and depression 01/19/2022    Aortic stenosis, mild 02/08/2023 12/2022    At high risk for falls 01/19/2022    Brain aneurysm     CLABSI (central line-associated bloodstream infection) 11/19/2021    Cough 01/19/2022    Diabetes mellitus ([address]) 2006    Diabetes mellitus type 2 with complications ([address]) 57/57/0787    Dizziness on standing 01/19/2022    End-stage renal disease on hemodialysis ([address]) 01/19/2023    ESRD (end stage renal disease) ([address]) 11/19/2021    ESRD on hemodialysis ([address]) 11/19/2021    Essential hypertension 11/30/2021    Fever, unspecified     Gastrointestinal hemorrhage 11/30/2021    H/O heart artery stent 2015    Done in [place]    History of Clostridioides difficile colitis 01/19/2022    History of pulmonary embolus (PE) 02/08/2023 12/2022    Hyperlipidemia     Hypertension     Leg swelling 01/19/2023    Lymphedema of both lower extremities 01/19/2023    Mild pulmonary hypertension ([address]) 2021    Transesophageal echo    MSSA bacteremia 11/18/2021    Nausea & vomiting

## 2023-11-27 ASSESSMENT — ENCOUNTER SYMPTOMS
BACK PAIN: 1
CHEST TIGHTNESS: 0
NAUSEA: 0
SORE THROAT: 0
VOICE CHANGE: 0
SHORTNESS OF BREATH: 0
ABDOMINAL PAIN: 0
WHEEZING: 0
VOMITING: 0

## 2023-11-29 LAB — POTASSIUM SERPL-SCNC: 4.9 MMOL/L (ref 3.5–5)

## 2023-12-03 NOTE — PROGRESS NOTES
Spoke with Dr. Maia Ortega about medication management of PE. Orders received. dizziness x 3 weeks w/ nausea / vomiting

## 2023-12-06 LAB — POTASSIUM SERPL-SCNC: 4.6 MMOL/L (ref 3.5–5)

## 2023-12-13 NOTE — TELEPHONE ENCOUNTER
PT called needs refill      atorvastatin (LIPITOR) 40 MG tablet       [facility] [place], [place] - [address] [[id number]]

## 2023-12-14 RX ORDER — ATORVASTATIN CALCIUM 40 MG/1
40 TABLET, FILM COATED ORAL EVERY MORNING
Qty: 90 TABLET | Refills: 1 | Status: SHIPPED | OUTPATIENT
Start: 2023-12-14

## 2023-12-25 ENCOUNTER — HOSPITAL ENCOUNTER (EMERGENCY)
Age: 68
Discharge: HOME | End: 2023-12-25
Attending: EMERGENCY MEDICINE
Payer: MEDICARE

## 2023-12-25 ENCOUNTER — APPOINTMENT (OUTPATIENT)
Dept: GENERAL RADIOLOGY | Age: 68
End: 2023-12-25
Payer: MEDICARE

## 2023-12-25 ENCOUNTER — APPOINTMENT (OUTPATIENT)
Dept: CT IMAGING | Age: 68
End: 2023-12-25
Payer: MEDICARE

## 2023-12-25 VITALS
SYSTOLIC BLOOD PRESSURE: 147 MMHG | WEIGHT: 255 LBS | BODY MASS INDEX: 43.54 KG/M2 | HEART RATE: 92 BPM | RESPIRATION RATE: 18 BRPM | OXYGEN SATURATION: 100 % | TEMPERATURE: 98.1 F | DIASTOLIC BLOOD PRESSURE: 74 MMHG | HEIGHT: 64 IN

## 2023-12-25 DIAGNOSIS — R07.9 CHEST PAIN, UNSPECIFIED TYPE: Primary | ICD-10-CM

## 2023-12-25 LAB
ALBUMIN SERPL-MCNC: 3.9 G/DL (ref 3.5–5.2)
ALP SERPL-CCNC: 108 U/L (ref 35–104)
ALT SERPL-CCNC: 9 U/L (ref 0–32)
ANION GAP SERPL CALCULATED.3IONS-SCNC: 22 MMOL/L (ref 7–16)
AST SERPL-CCNC: 11 U/L (ref 0–31)
BASOPHILS # BLD: 0.04 K/UL (ref 0–0.2)
BASOPHILS NFR BLD: 0 % (ref 0–2)
BILIRUB SERPL-MCNC: 0.3 MG/DL (ref 0–1.2)
BNP SERPL-MCNC: ABNORMAL PG/ML (ref 0–125)
BUN SERPL-MCNC: 79 MG/DL (ref 6–23)
CALCIUM SERPL-MCNC: 8.9 MG/DL (ref 8.6–10.2)
CHLORIDE SERPL-SCNC: 92 MMOL/L (ref 98–107)
CO2 SERPL-SCNC: 25 MMOL/L (ref 22–29)
CREAT SERPL-MCNC: 7.8 MG/DL (ref 0.5–1)
EOSINOPHIL # BLD: 0.49 K/UL (ref 0.05–0.5)
EOSINOPHILS RELATIVE PERCENT: 5 % (ref 0–6)
ERYTHROCYTE [DISTWIDTH] IN BLOOD BY AUTOMATED COUNT: 14.4 % (ref 11.5–15)
GFR SERPL CREATININE-BSD FRML MDRD: 5 ML/MIN/1.73M2
GLUCOSE SERPL-MCNC: 373 MG/DL (ref 74–99)
HCT VFR BLD AUTO: 29.9 % (ref 34–48)
HGB BLD-MCNC: 9.8 G/DL (ref 11.5–15.5)
IMM GRANULOCYTES # BLD AUTO: 0.03 K/UL (ref 0–0.58)
IMM GRANULOCYTES NFR BLD: 0 % (ref 0–5)
INFLUENZA A BY PCR: NOT DETECTED
INFLUENZA B BY PCR: NOT DETECTED
LYMPHOCYTES NFR BLD: 1 K/UL (ref 1.5–4)
LYMPHOCYTES RELATIVE PERCENT: 10 % (ref 20–42)
MCH RBC QN AUTO: 32.8 PG (ref 26–35)
MCHC RBC AUTO-ENTMCNC: 32.8 G/DL (ref 32–34.5)
MCV RBC AUTO: 100 FL (ref 80–99.9)
MONOCYTES NFR BLD: 0.57 K/UL (ref 0.1–0.95)
MONOCYTES NFR BLD: 6 % (ref 2–12)
NEUTROPHILS NFR BLD: 79 % (ref 43–80)
NEUTS SEG NFR BLD: 7.81 K/UL (ref 1.8–7.3)
PLATELET # BLD AUTO: 165 K/UL (ref 130–450)
PMV BLD AUTO: 10.9 FL (ref 7–12)
POTASSIUM SERPL-SCNC: 4.8 MMOL/L (ref 3.5–5)
PROT SERPL-MCNC: 7.2 G/DL (ref 6.4–8.3)
RBC # BLD AUTO: 2.99 M/UL (ref 3.5–5.5)
SARS-COV-2 RDRP RESP QL NAA+PROBE: NOT DETECTED
SODIUM SERPL-SCNC: 139 MMOL/L (ref 132–146)
SPECIMEN DESCRIPTION: NORMAL
TROPONIN I SERPL HS-MCNC: 69 NG/L (ref 0–9)
TROPONIN I SERPL HS-MCNC: 72 NG/L (ref 0–9)
WBC OTHER # BLD: 9.9 K/UL (ref 4.5–11.5)

## 2023-12-25 PROCEDURE — 71045 X-RAY EXAM CHEST 1 VIEW: CPT

## 2023-12-25 PROCEDURE — 85025 COMPLETE CBC W/AUTO DIFF WBC: CPT

## 2023-12-25 PROCEDURE — 87635 SARS-COV-2 COVID-19 AMP PRB: CPT

## 2023-12-25 PROCEDURE — 87502 INFLUENZA DNA AMP PROBE: CPT

## 2023-12-25 PROCEDURE — 84484 ASSAY OF TROPONIN QUANT: CPT

## 2023-12-25 PROCEDURE — 80053 COMPREHEN METABOLIC PANEL: CPT

## 2023-12-25 PROCEDURE — 6360000004 HC RX CONTRAST MEDICATION: Performed by: RADIOLOGY

## 2023-12-25 PROCEDURE — 93005 ELECTROCARDIOGRAM TRACING: CPT

## 2023-12-25 PROCEDURE — 71275 CT ANGIOGRAPHY CHEST: CPT

## 2023-12-25 PROCEDURE — 99285 EMERGENCY DEPT VISIT HI MDM: CPT

## 2023-12-25 PROCEDURE — 83880 ASSAY OF NATRIURETIC PEPTIDE: CPT

## 2023-12-25 RX ADMIN — IOPAMIDOL 75 ML: 755 INJECTION, SOLUTION INTRAVENOUS at 14:33

## 2023-12-25 NOTE — ED PROVIDER NOTES
[address] [facility]        Pt Name: Chelo Solis  MRN: [id number]  [address] [facility] 1955  Date of evaluation: 12/25/2023  Provider: Halie Wallace MD  PCP: Rio De La Fuente MD  Note Started: 1:26 PM EST 12/25/23    CHIEF COMPLAINT       Chief Complaint   Patient presents with    Chest Pain     Onset last night after eating, since resolved, and then came back about hr ago Lt chest hurts worse with movement    Shortness of Breath       HISTORY OF PRESENT ILLNESS: 1 or more Elements   History From: Patient. Limitations to history : None    Chelo Solis is a 80 y.o. female with a history of pulmonary embolism, congestive heart failure, aortic stenosis, type 2 diabetes mellitus, end-stage renal disease on hemodialysis, hypertension who presents to the ED with complaints of left-sided chest pain. Patient states that she had 1 episode of left-sided pressurizing chest pain at yesterday night, which went away on its own after some time. While having brunch this noon she started having left-sided chest pain again which is pressurizing in nature and constant. Chest pain is also radiating to her neck and jaw. She does have a history of coronary artery stent placement in 2015 and 2016. She is on hemodialysis Tuesday, Thursday and Saturday. She denies abdominal pain, constipation, diarrhea, urinary complaints or any other active symptoms now.     Nursing Notes were all reviewed and agreed with or any disagreements were addressed in the HPI.    ROS:   Pertinent positives and negatives are stated within HPI, all other systems reviewed and are negative.    --------------------------------------------- PAST HISTORY ---------------------------------------------  Past Medical History:  has a past medical history of Acute on chronic heart failure with preserved ejection fraction ([address]), Acute pulmonary embolism ([address]), Anemia due to stage 5 chronic

## 2023-12-27 LAB
EKG ATRIAL RATE: 89 BPM
EKG P AXIS: 10 DEGREES
EKG P-R INTERVAL: 190 MS
EKG Q-T INTERVAL: 408 MS
EKG QRS DURATION: 94 MS
EKG QTC CALCULATION (BAZETT): 496 MS
EKG R AXIS: -5 DEGREES
EKG T AXIS: 66 DEGREES
EKG VENTRICULAR RATE: 89 BPM

## 2023-12-27 PROCEDURE — 93010 ELECTROCARDIOGRAM REPORT: CPT | Performed by: INTERNAL MEDICINE

## 2024-01-01 ENCOUNTER — APPOINTMENT (OUTPATIENT)
Dept: DIALYSIS | Facility: HOSPITAL | Age: 69
End: 2024-01-01
Payer: MEDICARE

## 2024-01-01 ENCOUNTER — APPOINTMENT (OUTPATIENT)
Dept: RADIOLOGY | Facility: HOSPITAL | Age: 69
DRG: 266 | End: 2024-01-01
Payer: MEDICARE

## 2024-01-01 ENCOUNTER — APPOINTMENT (OUTPATIENT)
Dept: CARDIOLOGY | Facility: HOSPITAL | Age: 69
End: 2024-01-01
Payer: MEDICARE

## 2024-01-01 ENCOUNTER — APPOINTMENT (OUTPATIENT)
Dept: CARDIOLOGY | Facility: HOSPITAL | Age: 69
DRG: 266 | End: 2024-01-01
Payer: MEDICARE

## 2024-01-01 ENCOUNTER — HOSPITAL ENCOUNTER (OUTPATIENT)
Dept: CARDIOLOGY | Facility: HOSPITAL | Age: 69
Discharge: HOME | DRG: 266 | End: 2024-11-07
Payer: MEDICARE

## 2024-01-01 ENCOUNTER — APPOINTMENT (OUTPATIENT)
Dept: VASCULAR MEDICINE | Facility: HOSPITAL | Age: 69
End: 2024-01-01
Payer: MEDICARE

## 2024-01-01 ENCOUNTER — CARDIOLOGY CONFERENCE (OUTPATIENT)
Dept: CARDIOLOGY | Facility: HOSPITAL | Age: 69
End: 2024-01-01

## 2024-01-01 DIAGNOSIS — I44.2 COMPLETE HEART BLOCK: ICD-10-CM

## 2024-01-01 DIAGNOSIS — I48.91 ATRIAL FIBRILLATION, UNSPECIFIED TYPE (MULTI): Primary | ICD-10-CM

## 2024-01-01 PROCEDURE — 71045 X-RAY EXAM CHEST 1 VIEW: CPT

## 2024-01-01 PROCEDURE — 93005 ELECTROCARDIOGRAM TRACING: CPT

## 2024-01-01 PROCEDURE — 74018 RADEX ABDOMEN 1 VIEW: CPT

## 2024-01-01 PROCEDURE — 93321 DOPPLER ECHO F-UP/LMTD STD: CPT | Performed by: INTERNAL MEDICINE

## 2024-01-01 PROCEDURE — 71046 X-RAY EXAM CHEST 2 VIEWS: CPT

## 2024-01-01 PROCEDURE — 93325 DOPPLER ECHO COLOR FLOW MAPG: CPT

## 2024-01-01 PROCEDURE — 93308 TTE F-UP OR LMTD: CPT | Performed by: INTERNAL MEDICINE

## 2024-01-01 PROCEDURE — 70355 PANORAMIC X-RAY OF JAWS: CPT

## 2024-01-01 PROCEDURE — 93308 TTE F-UP OR LMTD: CPT

## 2024-01-01 PROCEDURE — 93926 LOWER EXTREMITY STUDY: CPT | Performed by: SURGERY

## 2024-01-01 PROCEDURE — 93325 DOPPLER ECHO COLOR FLOW MAPG: CPT | Performed by: STUDENT IN AN ORGANIZED HEALTH CARE EDUCATION/TRAINING PROGRAM

## 2024-01-01 PROCEDURE — 93926 LOWER EXTREMITY STUDY: CPT

## 2024-01-01 PROCEDURE — 74176 CT ABD & PELVIS W/O CONTRAST: CPT

## 2024-01-01 PROCEDURE — 93321 DOPPLER ECHO F-UP/LMTD STD: CPT | Performed by: STUDENT IN AN ORGANIZED HEALTH CARE EDUCATION/TRAINING PROGRAM

## 2024-01-01 PROCEDURE — 74174 CTA ABD&PLVS W/CONTRAST: CPT

## 2024-01-01 PROCEDURE — 93325 DOPPLER ECHO COLOR FLOW MAPG: CPT | Performed by: INTERNAL MEDICINE

## 2024-01-01 PROCEDURE — 93925 LOWER EXTREMITY STUDY: CPT

## 2024-01-01 PROCEDURE — 93308 TTE F-UP OR LMTD: CPT | Performed by: STUDENT IN AN ORGANIZED HEALTH CARE EDUCATION/TRAINING PROGRAM

## 2024-01-16 ENCOUNTER — HOSPITAL ENCOUNTER (INPATIENT)
Age: 69
LOS: 4 days | Discharge: HOME | DRG: 177 | End: 2024-01-20
Attending: EMERGENCY MEDICINE | Admitting: INTERNAL MEDICINE
Payer: MEDICARE

## 2024-01-16 ENCOUNTER — APPOINTMENT (OUTPATIENT)
Dept: GENERAL RADIOLOGY | Age: 69
DRG: 177 | End: 2024-01-16
Payer: MEDICARE

## 2024-01-16 ENCOUNTER — APPOINTMENT (OUTPATIENT)
Dept: CT IMAGING | Age: 69
DRG: 177 | End: 2024-01-16
Payer: MEDICARE

## 2024-01-16 ENCOUNTER — APPOINTMENT (OUTPATIENT)
Dept: ULTRASOUND IMAGING | Age: 69
DRG: 177 | End: 2024-01-16
Payer: MEDICARE

## 2024-01-16 DIAGNOSIS — E87.5 HYPERKALEMIA: ICD-10-CM

## 2024-01-16 DIAGNOSIS — U07.1 COVID-19: ICD-10-CM

## 2024-01-16 DIAGNOSIS — E87.70 HYPERVOLEMIA, UNSPECIFIED HYPERVOLEMIA TYPE: Primary | ICD-10-CM

## 2024-01-16 PROBLEM — D64.9 CHRONIC ANEMIA: Status: ACTIVE | Noted: 2024-01-16

## 2024-01-16 PROBLEM — D75.89 MACROCYTOSIS: Status: ACTIVE | Noted: 2024-01-16

## 2024-01-16 LAB
ALBUMIN SERPL-MCNC: 3.9 G/DL (ref 3.5–5.2)
ALP SERPL-CCNC: 100 U/L (ref 35–104)
ALT SERPL-CCNC: 12 U/L (ref 0–32)
ANION GAP SERPL CALCULATED.3IONS-SCNC: 18 MMOL/L (ref 7–16)
AST SERPL-CCNC: 20 U/L (ref 0–31)
BASOPHILS # BLD: 0.04 K/UL (ref 0–0.2)
BASOPHILS # BLD: 0.05 K/UL (ref 0–0.2)
BASOPHILS NFR BLD: 1 % (ref 0–2)
BASOPHILS NFR BLD: 1 % (ref 0–2)
BILIRUB SERPL-MCNC: 0.3 MG/DL (ref 0–1.2)
BNP SERPL-MCNC: ABNORMAL PG/ML (ref 0–125)
BUN SERPL-MCNC: 68 MG/DL (ref 6–23)
CALCIUM SERPL-MCNC: 9.3 MG/DL (ref 8.6–10.2)
CHLORIDE SERPL-SCNC: 95 MMOL/L (ref 98–107)
CO2 SERPL-SCNC: 27 MMOL/L (ref 22–29)
CREAT SERPL-MCNC: 8.4 MG/DL (ref 0.5–1)
CRP SERPL HS-MCNC: 12 MG/L (ref 0–5)
EOSINOPHIL # BLD: 0.63 K/UL (ref 0.05–0.5)
EOSINOPHIL # BLD: 0.72 K/UL (ref 0.05–0.5)
EOSINOPHILS RELATIVE PERCENT: 7 % (ref 0–6)
EOSINOPHILS RELATIVE PERCENT: 9 % (ref 0–6)
ERYTHROCYTE [DISTWIDTH] IN BLOOD BY AUTOMATED COUNT: 14.9 % (ref 11.5–15)
ERYTHROCYTE [DISTWIDTH] IN BLOOD BY AUTOMATED COUNT: 15 % (ref 11.5–15)
ERYTHROCYTE [SEDIMENTATION RATE] IN BLOOD BY WESTERGREN METHOD: 56 MM/HR (ref 0–20)
GFR SERPL CREATININE-BSD FRML MDRD: 5 ML/MIN/1.73M2
GLUCOSE BLD-MCNC: 152 MG/DL (ref 74–99)
GLUCOSE SERPL-MCNC: 165 MG/DL (ref 74–99)
HBA1C MFR BLD: 7.4 % (ref 4–5.6)
HCT VFR BLD AUTO: 32.3 % (ref 34–48)
HCT VFR BLD AUTO: 32.9 % (ref 34–48)
HGB BLD-MCNC: 10.3 G/DL (ref 11.5–15.5)
HGB BLD-MCNC: 10.3 G/DL (ref 11.5–15.5)
IMM GRANULOCYTES # BLD AUTO: 0.05 K/UL (ref 0–0.58)
IMM GRANULOCYTES # BLD AUTO: <0.03 K/UL (ref 0–0.58)
IMM GRANULOCYTES NFR BLD: 0 % (ref 0–5)
IMM GRANULOCYTES NFR BLD: 1 % (ref 0–5)
INFLUENZA A BY PCR: NOT DETECTED
INFLUENZA B BY PCR: NOT DETECTED
LYMPHOCYTES NFR BLD: 0.97 K/UL (ref 1.5–4)
LYMPHOCYTES NFR BLD: 1.07 K/UL (ref 1.5–4)
LYMPHOCYTES RELATIVE PERCENT: 11 % (ref 20–42)
LYMPHOCYTES RELATIVE PERCENT: 13 % (ref 20–42)
MCH RBC QN AUTO: 32.4 PG (ref 26–35)
MCH RBC QN AUTO: 33.1 PG (ref 26–35)
MCHC RBC AUTO-ENTMCNC: 31.3 G/DL (ref 32–34.5)
MCHC RBC AUTO-ENTMCNC: 31.9 G/DL (ref 32–34.5)
MCV RBC AUTO: 103.5 FL (ref 80–99.9)
MCV RBC AUTO: 103.9 FL (ref 80–99.9)
MONOCYTES NFR BLD: 0.43 K/UL (ref 0.1–0.95)
MONOCYTES NFR BLD: 0.47 K/UL (ref 0.1–0.95)
MONOCYTES NFR BLD: 5 % (ref 2–12)
MONOCYTES NFR BLD: 6 % (ref 2–12)
NEUTROPHILS NFR BLD: 72 % (ref 43–80)
NEUTROPHILS NFR BLD: 76 % (ref 43–80)
NEUTS SEG NFR BLD: 6.1 K/UL (ref 1.8–7.3)
NEUTS SEG NFR BLD: 6.92 K/UL (ref 1.8–7.3)
PARTIAL THROMBOPLASTIN TIME: 31 SEC (ref 24.5–35.1)
PLATELET # BLD AUTO: 156 K/UL (ref 130–450)
PLATELET, FLUORESCENCE: 145 K/UL (ref 130–450)
PMV BLD AUTO: 10.4 FL (ref 7–12)
PMV BLD AUTO: 10.7 FL (ref 7–12)
POTASSIUM SERPL-SCNC: 5.8 MMOL/L (ref 3.5–5)
PROT SERPL-MCNC: 7.2 G/DL (ref 6.4–8.3)
RBC # BLD AUTO: 3.11 M/UL (ref 3.5–5.5)
RBC # BLD AUTO: 3.18 M/UL (ref 3.5–5.5)
RSV BY PCR: NOT DETECTED
SARS-COV-2 RDRP RESP QL NAA+PROBE: DETECTED
SODIUM SERPL-SCNC: 140 MMOL/L (ref 132–146)
SPECIMEN DESCRIPTION: ABNORMAL
SPECIMEN SOURCE: NORMAL
TROPONIN I SERPL HS-MCNC: 59 NG/L (ref 0–9)
TROPONIN I SERPL HS-MCNC: 64 NG/L (ref 0–9)
WBC OTHER # BLD: 8.4 K/UL (ref 4.5–11.5)
WBC OTHER # BLD: 9.1 K/UL (ref 4.5–11.5)

## 2024-01-16 PROCEDURE — 85652 RBC SED RATE AUTOMATED: CPT

## 2024-01-16 PROCEDURE — 6370000000 HC RX 637 (ALT 250 FOR IP): Performed by: STUDENT IN AN ORGANIZED HEALTH CARE EDUCATION/TRAINING PROGRAM

## 2024-01-16 PROCEDURE — 99222 1ST HOSP IP/OBS MODERATE 55: CPT | Performed by: STUDENT IN AN ORGANIZED HEALTH CARE EDUCATION/TRAINING PROGRAM

## 2024-01-16 PROCEDURE — 86317 IMMUNOASSAY INFECTIOUS AGENT: CPT

## 2024-01-16 PROCEDURE — 5A1D70Z PERFORMANCE OF URINARY FILTRATION, INTERMITTENT, LESS THAN 6 HOURS PER DAY: ICD-10-PCS | Performed by: INTERNAL MEDICINE

## 2024-01-16 PROCEDURE — 2580000003 HC RX 258: Performed by: STUDENT IN AN ORGANIZED HEALTH CARE EDUCATION/TRAINING PROGRAM

## 2024-01-16 PROCEDURE — 84484 ASSAY OF TROPONIN QUANT: CPT

## 2024-01-16 PROCEDURE — 6360000002 HC RX W HCPCS: Performed by: STUDENT IN AN ORGANIZED HEALTH CARE EDUCATION/TRAINING PROGRAM

## 2024-01-16 PROCEDURE — 93005 ELECTROCARDIOGRAM TRACING: CPT

## 2024-01-16 PROCEDURE — 86140 C-REACTIVE PROTEIN: CPT

## 2024-01-16 PROCEDURE — 82607 VITAMIN B-12: CPT

## 2024-01-16 PROCEDURE — 6360000002 HC RX W HCPCS: Performed by: EMERGENCY MEDICINE

## 2024-01-16 PROCEDURE — 3E0333Z INTRODUCTION OF ANTI-INFLAMMATORY INTO PERIPHERAL VEIN, PERCUTANEOUS APPROACH: ICD-10-PCS | Performed by: STUDENT IN AN ORGANIZED HEALTH CARE EDUCATION/TRAINING PROGRAM

## 2024-01-16 PROCEDURE — 2700000000 HC OXYGEN THERAPY PER DAY

## 2024-01-16 PROCEDURE — 90935 HEMODIALYSIS ONE EVALUATION: CPT

## 2024-01-16 PROCEDURE — 82962 GLUCOSE BLOOD TEST: CPT

## 2024-01-16 PROCEDURE — 87635 SARS-COV-2 COVID-19 AMP PRB: CPT

## 2024-01-16 PROCEDURE — 71045 X-RAY EXAM CHEST 1 VIEW: CPT

## 2024-01-16 PROCEDURE — 83880 ASSAY OF NATRIURETIC PEPTIDE: CPT

## 2024-01-16 PROCEDURE — 83036 HEMOGLOBIN GLYCOSYLATED A1C: CPT

## 2024-01-16 PROCEDURE — 87502 INFLUENZA DNA AMP PROBE: CPT

## 2024-01-16 PROCEDURE — 99285 EMERGENCY DEPT VISIT HI MDM: CPT

## 2024-01-16 PROCEDURE — 6360000004 HC RX CONTRAST MEDICATION: Performed by: RADIOLOGY

## 2024-01-16 PROCEDURE — 85730 THROMBOPLASTIN TIME PARTIAL: CPT

## 2024-01-16 PROCEDURE — 87340 HEPATITIS B SURFACE AG IA: CPT

## 2024-01-16 PROCEDURE — 80053 COMPREHEN METABOLIC PANEL: CPT

## 2024-01-16 PROCEDURE — 85025 COMPLETE CBC W/AUTO DIFF WBC: CPT

## 2024-01-16 PROCEDURE — 87634 RSV DNA/RNA AMP PROBE: CPT

## 2024-01-16 PROCEDURE — 94664 DEMO&/EVAL PT USE INHALER: CPT

## 2024-01-16 PROCEDURE — 82746 ASSAY OF FOLIC ACID SERUM: CPT

## 2024-01-16 PROCEDURE — 93970 EXTREMITY STUDY: CPT

## 2024-01-16 PROCEDURE — 1200000000 HC SEMI PRIVATE

## 2024-01-16 RX ORDER — CALCIUM ACETATE 667 MG/1
2 CAPSULE ORAL
Status: DISCONTINUED | OUTPATIENT
Start: 2024-01-16 | End: 2024-01-20 | Stop reason: HOSPADM

## 2024-01-16 RX ORDER — ONDANSETRON 4 MG/1
4 TABLET, ORALLY DISINTEGRATING ORAL EVERY 8 HOURS PRN
Status: DISCONTINUED | OUTPATIENT
Start: 2024-01-16 | End: 2024-01-20 | Stop reason: HOSPADM

## 2024-01-16 RX ORDER — INSULIN GLARGINE 100 [IU]/ML
10 INJECTION, SOLUTION SUBCUTANEOUS EVERY MORNING
COMMUNITY

## 2024-01-16 RX ORDER — SODIUM CHLORIDE 0.9 % (FLUSH) 0.9 %
5-40 SYRINGE (ML) INJECTION PRN
Status: DISCONTINUED | OUTPATIENT
Start: 2024-01-16 | End: 2024-01-20 | Stop reason: HOSPADM

## 2024-01-16 RX ORDER — ASPIRIN 81 MG/1
81 TABLET, CHEWABLE ORAL EVERY MORNING
Status: DISCONTINUED | OUTPATIENT
Start: 2024-01-17 | End: 2024-01-20 | Stop reason: HOSPADM

## 2024-01-16 RX ORDER — HEPARIN SODIUM 1000 [USP'U]/ML
80 INJECTION, SOLUTION INTRAVENOUS; SUBCUTANEOUS PRN
Status: DISCONTINUED | OUTPATIENT
Start: 2024-01-16 | End: 2024-01-18

## 2024-01-16 RX ORDER — MIDODRINE HYDROCHLORIDE 10 MG/1
10 TABLET ORAL 2 TIMES DAILY PRN
COMMUNITY

## 2024-01-16 RX ORDER — HEPARIN SODIUM 1000 [USP'U]/ML
80 INJECTION, SOLUTION INTRAVENOUS; SUBCUTANEOUS ONCE
Status: COMPLETED | OUTPATIENT
Start: 2024-01-16 | End: 2024-01-16

## 2024-01-16 RX ORDER — POLYETHYLENE GLYCOL 3350 17 G/17G
17 POWDER, FOR SOLUTION ORAL DAILY PRN
Status: DISCONTINUED | OUTPATIENT
Start: 2024-01-16 | End: 2024-01-20 | Stop reason: HOSPADM

## 2024-01-16 RX ORDER — PREDNISOLONE ACETATE 10 MG/ML
1 SUSPENSION/ DROPS OPHTHALMIC EVERY MORNING
COMMUNITY

## 2024-01-16 RX ORDER — PREDNISOLONE ACETATE 10 MG/ML
1 SUSPENSION/ DROPS OPHTHALMIC EVERY MORNING
Status: DISCONTINUED | OUTPATIENT
Start: 2024-01-17 | End: 2024-01-20 | Stop reason: HOSPADM

## 2024-01-16 RX ORDER — ONDANSETRON 2 MG/ML
4 INJECTION INTRAMUSCULAR; INTRAVENOUS EVERY 6 HOURS PRN
Status: DISCONTINUED | OUTPATIENT
Start: 2024-01-16 | End: 2024-01-20 | Stop reason: HOSPADM

## 2024-01-16 RX ORDER — IPRATROPIUM BROMIDE AND ALBUTEROL SULFATE 2.5; .5 MG/3ML; MG/3ML
1 SOLUTION RESPIRATORY (INHALATION)
Status: DISCONTINUED | OUTPATIENT
Start: 2024-01-16 | End: 2024-01-20

## 2024-01-16 RX ORDER — HEPARIN SODIUM 10000 [USP'U]/100ML
5-30 INJECTION, SOLUTION INTRAVENOUS CONTINUOUS
Status: DISCONTINUED | OUTPATIENT
Start: 2024-01-16 | End: 2024-01-18

## 2024-01-16 RX ORDER — INSULIN GLARGINE 100 [IU]/ML
10 INJECTION, SOLUTION SUBCUTANEOUS EVERY MORNING
COMMUNITY
End: 2024-01-16

## 2024-01-16 RX ORDER — SODIUM CHLORIDE 9 MG/ML
INJECTION, SOLUTION INTRAVENOUS PRN
Status: DISCONTINUED | OUTPATIENT
Start: 2024-01-16 | End: 2024-01-20 | Stop reason: HOSPADM

## 2024-01-16 RX ORDER — INSULIN LISPRO 100 [IU]/ML
0-4 INJECTION, SOLUTION INTRAVENOUS; SUBCUTANEOUS NIGHTLY
Status: DISCONTINUED | OUTPATIENT
Start: 2024-01-16 | End: 2024-01-16

## 2024-01-16 RX ORDER — INSULIN LISPRO 100 [IU]/ML
0-4 INJECTION, SOLUTION INTRAVENOUS; SUBCUTANEOUS NIGHTLY
Status: DISCONTINUED | OUTPATIENT
Start: 2024-01-16 | End: 2024-01-20 | Stop reason: HOSPADM

## 2024-01-16 RX ORDER — HEPARIN SODIUM 5000 [USP'U]/ML
5000 INJECTION, SOLUTION INTRAVENOUS; SUBCUTANEOUS EVERY 8 HOURS SCHEDULED
Status: DISCONTINUED | OUTPATIENT
Start: 2024-01-16 | End: 2024-01-16

## 2024-01-16 RX ORDER — DEXTROSE MONOHYDRATE 100 MG/ML
INJECTION, SOLUTION INTRAVENOUS CONTINUOUS PRN
Status: DISCONTINUED | OUTPATIENT
Start: 2024-01-16 | End: 2024-01-20 | Stop reason: HOSPADM

## 2024-01-16 RX ORDER — CALCIUM GLUCONATE 94 MG/ML
1000 INJECTION, SOLUTION INTRAVENOUS ONCE
Status: COMPLETED | OUTPATIENT
Start: 2024-01-16 | End: 2024-01-16

## 2024-01-16 RX ORDER — ACETAMINOPHEN 325 MG/1
650 TABLET ORAL EVERY 6 HOURS PRN
Status: DISCONTINUED | OUTPATIENT
Start: 2024-01-16 | End: 2024-01-20 | Stop reason: HOSPADM

## 2024-01-16 RX ORDER — INSULIN LISPRO 100 [IU]/ML
0-4 INJECTION, SOLUTION INTRAVENOUS; SUBCUTANEOUS
Status: DISCONTINUED | OUTPATIENT
Start: 2024-01-16 | End: 2024-01-16

## 2024-01-16 RX ORDER — INSULIN LISPRO 100 [IU]/ML
0-16 INJECTION, SOLUTION INTRAVENOUS; SUBCUTANEOUS
Status: DISCONTINUED | OUTPATIENT
Start: 2024-01-16 | End: 2024-01-20 | Stop reason: HOSPADM

## 2024-01-16 RX ORDER — MIDODRINE HYDROCHLORIDE 5 MG/1
10 TABLET ORAL 2 TIMES DAILY PRN
Status: DISCONTINUED | OUTPATIENT
Start: 2024-01-16 | End: 2024-01-20 | Stop reason: HOSPADM

## 2024-01-16 RX ORDER — HEPARIN SODIUM 1000 [USP'U]/ML
40 INJECTION, SOLUTION INTRAVENOUS; SUBCUTANEOUS PRN
Status: DISCONTINUED | OUTPATIENT
Start: 2024-01-16 | End: 2024-01-18

## 2024-01-16 RX ORDER — ATORVASTATIN CALCIUM 40 MG/1
40 TABLET, FILM COATED ORAL EVERY MORNING
Status: DISCONTINUED | OUTPATIENT
Start: 2024-01-17 | End: 2024-01-20 | Stop reason: HOSPADM

## 2024-01-16 RX ORDER — DEXAMETHASONE SODIUM PHOSPHATE 10 MG/ML
6 INJECTION INTRAMUSCULAR; INTRAVENOUS EVERY 24 HOURS
Status: DISCONTINUED | OUTPATIENT
Start: 2024-01-16 | End: 2024-01-20 | Stop reason: HOSPADM

## 2024-01-16 RX ORDER — INSULIN GLARGINE 100 [IU]/ML
10 INJECTION, SOLUTION SUBCUTANEOUS EVERY MORNING
Status: DISCONTINUED | OUTPATIENT
Start: 2024-01-17 | End: 2024-01-20 | Stop reason: HOSPADM

## 2024-01-16 RX ORDER — SODIUM CHLORIDE 0.9 % (FLUSH) 0.9 %
5-40 SYRINGE (ML) INJECTION EVERY 12 HOURS SCHEDULED
Status: DISCONTINUED | OUTPATIENT
Start: 2024-01-16 | End: 2024-01-20 | Stop reason: HOSPADM

## 2024-01-16 RX ORDER — ACETAMINOPHEN 650 MG/1
650 SUPPOSITORY RECTAL EVERY 6 HOURS PRN
Status: DISCONTINUED | OUTPATIENT
Start: 2024-01-16 | End: 2024-01-20 | Stop reason: HOSPADM

## 2024-01-16 RX ORDER — SEMAGLUTIDE 0.68 MG/ML
0.5 INJECTION, SOLUTION SUBCUTANEOUS WEEKLY
COMMUNITY
Start: 2023-12-05 | End: 2024-01-16 | Stop reason: ALTCHOICE

## 2024-01-16 RX ADMIN — SODIUM CHLORIDE, PRESERVATIVE FREE 10 ML: 5 INJECTION INTRAVENOUS at 21:46

## 2024-01-16 RX ADMIN — DEXAMETHASONE SODIUM PHOSPHATE 6 MG: 10 INJECTION INTRAMUSCULAR; INTRAVENOUS at 21:45

## 2024-01-16 RX ADMIN — ACETAMINOPHEN 650 MG: 325 TABLET ORAL at 22:35

## 2024-01-16 RX ADMIN — IPRATROPIUM BROMIDE AND ALBUTEROL SULFATE 1 DOSE: .5; 2.5 SOLUTION RESPIRATORY (INHALATION) at 22:16

## 2024-01-16 RX ADMIN — CALCIUM GLUCONATE 1000 MG: 98 INJECTION, SOLUTION INTRAVENOUS at 16:39

## 2024-01-16 RX ADMIN — IOPAMIDOL 75 ML: 755 INJECTION, SOLUTION INTRAVENOUS at 15:54

## 2024-01-16 RX ADMIN — HEPARIN SODIUM 9260 UNITS: 1000 INJECTION INTRAVENOUS; SUBCUTANEOUS at 22:32

## 2024-01-16 RX ADMIN — HEPARIN SODIUM 15.56 UNITS/KG/HR: 10000 INJECTION, SOLUTION INTRAVENOUS at 22:34

## 2024-01-16 ASSESSMENT — PAIN DESCRIPTION - LOCATION: LOCATION: HEAD

## 2024-01-16 ASSESSMENT — PAIN DESCRIPTION - PAIN TYPE: TYPE: ACUTE PAIN

## 2024-01-16 ASSESSMENT — PAIN - FUNCTIONAL ASSESSMENT
PAIN_FUNCTIONAL_ASSESSMENT: NONE - DENIES PAIN
PAIN_FUNCTIONAL_ASSESSMENT: ACTIVITIES ARE NOT PREVENTED
PAIN_FUNCTIONAL_ASSESSMENT: NONE - DENIES PAIN

## 2024-01-16 ASSESSMENT — PAIN DESCRIPTION - ORIENTATION: ORIENTATION: MID

## 2024-01-16 ASSESSMENT — PAIN DESCRIPTION - FREQUENCY: FREQUENCY: INTERMITTENT

## 2024-01-16 ASSESSMENT — PAIN SCALES - GENERAL: PAINLEVEL_OUTOF10: 8

## 2024-01-16 ASSESSMENT — PAIN DESCRIPTION - ONSET: ONSET: GRADUAL

## 2024-01-16 ASSESSMENT — PAIN DESCRIPTION - DESCRIPTORS: DESCRIPTORS: ACHING

## 2024-01-16 NOTE — ED NOTES
Radiology Procedure Waiver   Name: Lizette Murray  : 1955  MRN: [id number]    Date:  24    Time: 1:31 PM EST    Benefits of immediately proceeding with radiology exam(s) without pre-testing outweigh the risks or are not indicated as specified below and therefore the following is/are being waived:    [] Benefits of immediate radiology exam(s) outweigh any risk.                                               OR    Pre-exam testing is not indicated for the following reason(s):  [] Pregnancy test   [] Patients LMP on-time and regular.   [] Patient had Tubal Ligation or has other Contraception Device.   [] Patient  is Menopausal or Premenarcheal.    [] Patient had Full or Partial Hysterectomy.    [] Protocol for CT contrast allegry   [] Patient has tolerated well previously   [] Patient does not have a true allergy    [] MRI Questionnaire     [x] BUN/Creatinine   [] Patient age w/no hx of renal dysfunction.   [x] Patient on Dialysis.   [] Recent Normal Labs.  Electronically signed by Chay Guidry II, DO on 24 at 1:31 PM EST

## 2024-01-16 NOTE — ED NOTES
Pharmacy notified that the patient will be leaving unit to go to dialysis then to her inpatient room.

## 2024-01-16 NOTE — H&P
[facility]  Hospitalist Group   History and Physical      CHIEF COMPLAINT: SOB    History of Present Illness:  68 y.o. female with a history of PE, HLD, HTN, CKD on HD, CHF, DM, IVC filter, brain aneurysm presents with increased shortness of breath.  Patient said chronically wears 2 LNC at home.  Began to feel increased shortness of breath approximately 2 days prior to presentation.  States pulse oximetry at home was in the 80s.  Also notes mild pains to right side of chest wall over the last couple of days.  Cough/congestion over the last week.  She denies any fevers, chills, N/V/D.  Decision to admit patient for further evaluation and treatment.    Informant(s) for H&P: Patient and EMR    REVIEW OF SYSTEMS:  Admits to cough, congestion, CP, SOB.  Denies  fevers, chills, n/v, ha, vision/hearing changes, wt changes, hot/cold flashes, other open skin lesions, diarrhea, constipation, dysuria/hematuria unless noted in HPI. Complete ROS performed with the patient and is otherwise negative.      PMH:  Past Medical History:   Diagnosis Date    Acute on chronic heart failure with preserved ejection fraction ([facility]) 02/07/2023    Acute pulmonary embolism (HCC) 12/03/2022    Anemia due to stage 5 chronic kidney disease, not on chronic dialysis ([facility]) 02/08/2023    Anemia in CKD (chronic kidney disease) 11/30/2021    Anxiety and depression 01/19/2022    Aortic stenosis, mild 02/08/2023 12/2022    At high risk for falls 01/19/2022    Brain aneurysm     CLABSI (central line-associated bloodstream infection) 11/19/2021    Cough 01/19/2022    Diabetes mellitus ([facility]) 2006    Diabetes mellitus type 2 with complications ([facility]) 11/30/2021    Dizziness on standing 01/19/2022    End-stage renal disease on hemodialysis ([facility]) 01/19/2023    ESRD (end stage renal disease) ([facility]) 11/19/2021    ESRD on hemodialysis ([facility]) 11/19/2021    Essential hypertension 11/30/2021    Fever, unspecified     Gastrointestinal hemorrhage

## 2024-01-16 NOTE — ED PROVIDER NOTES
[facility] EMERGENCY DEPARTMENT  EMERGENCY DEPARTMENT ENCOUNTER        Pt Name: Lizette Murray  MRN: [id number]  Birthdate 1955  Date of evaluation: 2024  Provider: Chay Guidry II, DO  PCP: Lourdes Murrell MD  Note Started: 1:28 PM EST 24    CHIEF COMPLAINT       Chief Complaint   Patient presents with    Shortness of Breath     75% upon arrival via wc. Has prn o2. In 80's at home. Place on 4L. Missed dialysis today.        HISTORY OF PRESENT ILLNESS: 1 or more Elements            Lizette Murray is a 68 y.o. female history of ESRD on dialysis, presents to ER for complaint of hypoxia.  Patient states her home pulse ox was showing saturation in the 80s on her baseline 2 L nasal cannula.  Patient states she has been feeling short of breath for the past 2 days.  Patient also complains of very mild sharp pains in the right side of her chest wall for the past several days.  Patient reports cough and congestion for the past week.  Patient does also report some chronic leg swelling in the bilateral lower extremities up to the level of the calves.  Patient reports some nausea with no vomiting, no diarrhea.  Patient does state that she has had a history of PE 1 year ago.    Nursing Notes were all reviewed and agreed with or any disagreements were addressed in the HPI.      REVIEW OF EXTERNAL NOTE :       Records reviewed for medical history, surgical history, medication list.      Chart Review/External Note Review    Last Echo reviewed by Me:  Lab Results   Component Value Date    LVEF 58 2022             Controlled Substance Monitoring:    Acute and Chronic Pain Monitoring:        No data to display                    REVIEW OF SYSTEMS :      Positives and Pertinent negatives as per HPI.     SURGICAL HISTORY     Past Surgical History:   Procedure Laterality Date    CARDIAC CATHETERIZATION  2015    Done in [place]    CARDIAC SURGERY       SECTION    cannot tolerate CTA due to her feeling short of breath likely from her fluid overload. [RASHEEDA]      ED Course User Index  [CF] Clint Palmer DO  [RASHEEDA] Chay Guidry II, DO       Medical Decision Making  Amount and/or Complexity of Data Reviewed  Labs: ordered. Decision-making details documented in ED Course.  Radiology: ordered.  ECG/medicine tests: ordered.    Risk  Prescription drug management.  Decision regarding hospitalization.        68-year-old female history of ESRD, CAD, PE in the past, diabetes, presents to ER for shortness of breath for the past several days.  Per report patient was hypoxic on her baseline 2 L nasal cannula at home, saturating in the 80s.  Patient reports cough and congestion for the past week, no fevers, vomiting, diarrhea or dysuria.  Patient denies any recent surgeries, recent travel.  Patient also missed her dialysis appointment today.  On exam patient is nontoxic in appearance, answering all questions appropriately, lungs are mildly diminished, no obvious Rales, rhonchi, wheezes.  Patient requiring more oxygen than her baseline, maintaining saturations well.  Abdomen is soft nontender, no chest wall tenderness palpation.  Bilateral lower extremities appear to be chronically edematous, no signs of evidence  cellulitis or lymphedema.  Patient does endorse some tenderness palpation over the posterior calves bilaterally.  Differential diagnosis includes not limited to renal failure, ACS, CHF exacerbation, COPD exacerbation, pneumonia, viral illness, PE.  Workup to include CTA, bilateral lower leg ultrasounds, chest x-ray, CBC, CMP, troponin, BNP, COVID, influenza, RSV swabs.  Patient's workup shows no leukocytosis, no anemia, chemistries show hyperkalemia 5.8, elevation creatinine 8.4, GFR of 5, this is to be expected in the setting of patient missing her dialysis today.  Consulted with her nephrologist Dr. Mcdowell who agrees for plan to admit and dialyze patient.  Patient's BNP is

## 2024-01-16 NOTE — ACP (ADVANCE CARE PLANNING)
Advance Care Planning     Advance Care Planning Activator (Inpatient)  Conversation Note      Date of ACP Conversation: 1/16/2024     Conversation Conducted with: Patient with Decision Making Capacity    ACP Activator: HILDA brown    Health Care Decision Maker:     Current Designated Health Care Decision Maker:     Primary Decision Maker: Meghann Murray - Child - [phone]    Secondary Decision Maker: Wang Murray - Child - [phone]  Click here to complete Healthcare Decision Makers including section of the Healthcare Decision Maker Relationship (ie \"Primary\")  Today we documented Decision Maker(s) consistent with Legal Next of Kin hierarchy.    Care Preferences    Ventilation:  \"If you were in your present state of health and suddenly became very ill and were unable to breathe on your own, what would your preference be about the use of a ventilator (breathing machine) if it were available to you?\"      Would the patient desire the use of ventilator (breathing machine)?: yes    \"If your health worsens and it becomes clear that your chance of recovery is unlikely, what would your preference be about the use of a ventilator (breathing machine) if it were available to you?\"     Would the patient desire the use of ventilator (breathing machine)?: Yes      Resuscitation  \"CPR works best to restart the heart when there is a sudden event, like a heart attack, in someone who is otherwise healthy. Unfortunately, CPR does not typically restart the heart for people who have serious health conditions or who are very sick.\"    \"In the event your heart stopped as a result of an underlying serious health condition, would you want attempts to be made to restart your heart (answer \"yes\" for attempt to resuscitate) or would you prefer a natural death (answer \"no\" for do not attempt to resuscitate)?\" yes       [] Yes   [x] No   Educated Patient / Decision Maker regarding differences between Advance Directives and  portable DNR orders.    Length of ACP Conversation in minutes:  10    Conversation Outcomes:  ACP discussion completed    Follow-up plan:    [] Schedule follow-up conversation to continue planning  [x] Referred individual to Provider for additional questions/concerns   [] Advised patient/agent/surrogate to review completed ACP document and update if needed with changes in condition, patient preferences or care setting    [] This note routed to one or more involved healthcare providers

## 2024-01-16 NOTE — ED NOTES
ED to Inpatient Handoff Report    Notified KENJI Hernandez that electronic handoff available and patient ready for transport to room 525.    Safety Risks: Risk of falls    Patient in Restraints: no    Constant Observer or Patient : no    Telemetry Monitoring Ordered: Yes          Order to transfer to unit without monitor: NO    Last MEWS: 1 Time completed: 1644    Vitals:    01/16/24 1302 01/16/24 1644   BP: 117/82 (!) 146/77   Pulse: 71 85   Resp: 16 19   Temp: 98.2 °F (36.8 °C) 98.7 °F (37.1 °C)   TempSrc:  Oral   SpO2: 98% 99%   Weight: 115.7 kg (255 lb)    Height: 1.626 m (5' 4\")        Opportunity for questions and clarification was provided.

## 2024-01-16 NOTE — PROGRESS NOTES
Database initiated. Patient is A&O comes in from home with daughter. States she uses a walker and wears 2 liters oxygen at baseline. She is a NO LEFT ARM d/t fistula.

## 2024-01-17 LAB
ALBUMIN SERPL-MCNC: 4 G/DL (ref 3.5–5.2)
ALP SERPL-CCNC: 96 U/L (ref 35–104)
ALT SERPL-CCNC: 12 U/L (ref 0–32)
ANION GAP SERPL CALCULATED.3IONS-SCNC: 14 MMOL/L (ref 7–16)
AST SERPL-CCNC: 15 U/L (ref 0–31)
BASOPHILS # BLD: 0.02 K/UL (ref 0–0.2)
BASOPHILS NFR BLD: 0 % (ref 0–2)
BILIRUB SERPL-MCNC: 0.5 MG/DL (ref 0–1.2)
BUN SERPL-MCNC: 36 MG/DL (ref 6–23)
CALCIUM SERPL-MCNC: 8.9 MG/DL (ref 8.6–10.2)
CHLORIDE SERPL-SCNC: 93 MMOL/L (ref 98–107)
CO2 SERPL-SCNC: 29 MMOL/L (ref 22–29)
CREAT SERPL-MCNC: 5.5 MG/DL (ref 0.5–1)
EKG ATRIAL RATE: 69 BPM
EKG P AXIS: 57 DEGREES
EKG P-R INTERVAL: 186 MS
EKG Q-T INTERVAL: 430 MS
EKG QRS DURATION: 94 MS
EKG QTC CALCULATION (BAZETT): 460 MS
EKG R AXIS: -7 DEGREES
EKG T AXIS: 82 DEGREES
EKG VENTRICULAR RATE: 69 BPM
EOSINOPHIL # BLD: 0.04 K/UL (ref 0.05–0.5)
EOSINOPHILS RELATIVE PERCENT: 1 % (ref 0–6)
ERYTHROCYTE [DISTWIDTH] IN BLOOD BY AUTOMATED COUNT: 14.9 % (ref 11.5–15)
FOLATE SERPL-MCNC: 10.9 NG/ML (ref 4.8–24.2)
GFR SERPL CREATININE-BSD FRML MDRD: 8 ML/MIN/1.73M2
GLUCOSE BLD-MCNC: 214 MG/DL (ref 74–99)
GLUCOSE BLD-MCNC: 245 MG/DL (ref 74–99)
GLUCOSE BLD-MCNC: 302 MG/DL (ref 74–99)
GLUCOSE BLD-MCNC: 352 MG/DL (ref 74–99)
GLUCOSE BLD-MCNC: 368 MG/DL (ref 74–99)
GLUCOSE SERPL-MCNC: 304 MG/DL (ref 74–99)
HBV SURFACE AB SERPL IA-ACNC: 45.18 MIU/ML (ref 0–9.99)
HBV SURFACE AG SERPL QL IA: NONREACTIVE
HCT VFR BLD AUTO: 31.9 % (ref 34–48)
HGB BLD-MCNC: 10.2 G/DL (ref 11.5–15.5)
IMM GRANULOCYTES # BLD AUTO: 0.04 K/UL (ref 0–0.58)
IMM GRANULOCYTES NFR BLD: 1 % (ref 0–5)
LYMPHOCYTES NFR BLD: 0.5 K/UL (ref 1.5–4)
LYMPHOCYTES RELATIVE PERCENT: 6 % (ref 20–42)
MAGNESIUM SERPL-MCNC: 2.2 MG/DL (ref 1.6–2.6)
MCH RBC QN AUTO: 32.8 PG (ref 26–35)
MCHC RBC AUTO-ENTMCNC: 32 G/DL (ref 32–34.5)
MCV RBC AUTO: 102.6 FL (ref 80–99.9)
MONOCYTES NFR BLD: 0.12 K/UL (ref 0.1–0.95)
MONOCYTES NFR BLD: 1 % (ref 2–12)
NEUTROPHILS NFR BLD: 92 % (ref 43–80)
NEUTS SEG NFR BLD: 7.84 K/UL (ref 1.8–7.3)
PARTIAL THROMBOPLASTIN TIME: 109.8 SEC (ref 24.5–35.1)
PARTIAL THROMBOPLASTIN TIME: 230.8 SEC (ref 24.5–35.1)
PARTIAL THROMBOPLASTIN TIME: 73.9 SEC (ref 24.5–35.1)
PHOSPHATE SERPL-MCNC: 4 MG/DL (ref 2.5–4.5)
PLATELET # BLD AUTO: 128 K/UL (ref 130–450)
PMV BLD AUTO: 10.6 FL (ref 7–12)
POTASSIUM SERPL-SCNC: 5.6 MMOL/L (ref 3.5–5)
PROT SERPL-MCNC: 7.4 G/DL (ref 6.4–8.3)
RBC # BLD AUTO: 3.11 M/UL (ref 3.5–5.5)
RBC # BLD: ABNORMAL 10*6/UL
SODIUM SERPL-SCNC: 136 MMOL/L (ref 132–146)
VIT B12 SERPL-MCNC: 451 PG/ML (ref 211–946)
WBC OTHER # BLD: 8.6 K/UL (ref 4.5–11.5)

## 2024-01-17 PROCEDURE — 2500000003 HC RX 250 WO HCPCS: Performed by: STUDENT IN AN ORGANIZED HEALTH CARE EDUCATION/TRAINING PROGRAM

## 2024-01-17 PROCEDURE — 2700000000 HC OXYGEN THERAPY PER DAY

## 2024-01-17 PROCEDURE — 94640 AIRWAY INHALATION TREATMENT: CPT

## 2024-01-17 PROCEDURE — 6370000000 HC RX 637 (ALT 250 FOR IP): Performed by: STUDENT IN AN ORGANIZED HEALTH CARE EDUCATION/TRAINING PROGRAM

## 2024-01-17 PROCEDURE — 1200000000 HC SEMI PRIVATE

## 2024-01-17 PROCEDURE — 93010 ELECTROCARDIOGRAM REPORT: CPT | Performed by: INTERNAL MEDICINE

## 2024-01-17 PROCEDURE — 83735 ASSAY OF MAGNESIUM: CPT

## 2024-01-17 PROCEDURE — 6360000002 HC RX W HCPCS: Performed by: STUDENT IN AN ORGANIZED HEALTH CARE EDUCATION/TRAINING PROGRAM

## 2024-01-17 PROCEDURE — 85730 THROMBOPLASTIN TIME PARTIAL: CPT

## 2024-01-17 PROCEDURE — 2580000003 HC RX 258: Performed by: STUDENT IN AN ORGANIZED HEALTH CARE EDUCATION/TRAINING PROGRAM

## 2024-01-17 PROCEDURE — 2500000003 HC RX 250 WO HCPCS: Performed by: INTERNAL MEDICINE

## 2024-01-17 PROCEDURE — 90935 HEMODIALYSIS ONE EVALUATION: CPT

## 2024-01-17 PROCEDURE — 82962 GLUCOSE BLOOD TEST: CPT

## 2024-01-17 PROCEDURE — 85025 COMPLETE CBC W/AUTO DIFF WBC: CPT

## 2024-01-17 PROCEDURE — 6370000000 HC RX 637 (ALT 250 FOR IP): Performed by: NURSE PRACTITIONER

## 2024-01-17 PROCEDURE — 84100 ASSAY OF PHOSPHORUS: CPT

## 2024-01-17 PROCEDURE — 99232 SBSQ HOSP IP/OBS MODERATE 35: CPT | Performed by: STUDENT IN AN ORGANIZED HEALTH CARE EDUCATION/TRAINING PROGRAM

## 2024-01-17 PROCEDURE — 80053 COMPREHEN METABOLIC PANEL: CPT

## 2024-01-17 RX ORDER — CALCIUM GLUCONATE 20 MG/ML
1000 INJECTION, SOLUTION INTRAVENOUS ONCE
Status: COMPLETED | OUTPATIENT
Start: 2024-01-17 | End: 2024-01-17

## 2024-01-17 RX ORDER — INSULIN LISPRO 100 [IU]/ML
4 INJECTION, SOLUTION INTRAVENOUS; SUBCUTANEOUS ONCE
Status: COMPLETED | OUTPATIENT
Start: 2024-01-17 | End: 2024-01-17

## 2024-01-17 RX ADMIN — INSULIN LISPRO 12 UNITS: 100 INJECTION, SOLUTION INTRAVENOUS; SUBCUTANEOUS at 06:17

## 2024-01-17 RX ADMIN — INSULIN LISPRO 4 UNITS: 100 INJECTION, SOLUTION INTRAVENOUS; SUBCUTANEOUS at 17:13

## 2024-01-17 RX ADMIN — INSULIN LISPRO 4 UNITS: 100 INJECTION, SOLUTION INTRAVENOUS; SUBCUTANEOUS at 11:26

## 2024-01-17 RX ADMIN — IPRATROPIUM BROMIDE AND ALBUTEROL SULFATE 1 DOSE: .5; 2.5 SOLUTION RESPIRATORY (INHALATION) at 22:10

## 2024-01-17 RX ADMIN — CALCIUM ACETATE 1334 MG: 667 CAPSULE ORAL at 11:26

## 2024-01-17 RX ADMIN — DEXAMETHASONE SODIUM PHOSPHATE 6 MG: 10 INJECTION INTRAMUSCULAR; INTRAVENOUS at 16:28

## 2024-01-17 RX ADMIN — INSULIN GLARGINE 10 UNITS: 100 INJECTION, SOLUTION SUBCUTANEOUS at 08:10

## 2024-01-17 RX ADMIN — HEPARIN SODIUM 12 UNITS/KG/HR: 10000 INJECTION, SOLUTION INTRAVENOUS at 09:39

## 2024-01-17 RX ADMIN — HEPARIN SODIUM 9 UNITS/KG/HR: 10000 INJECTION, SOLUTION INTRAVENOUS at 18:56

## 2024-01-17 RX ADMIN — INSULIN LISPRO 4 UNITS: 100 INJECTION, SOLUTION INTRAVENOUS; SUBCUTANEOUS at 22:07

## 2024-01-17 RX ADMIN — SODIUM CHLORIDE, PRESERVATIVE FREE 10 ML: 5 INJECTION INTRAVENOUS at 21:46

## 2024-01-17 RX ADMIN — CALCIUM ACETATE 1334 MG: 667 CAPSULE ORAL at 16:28

## 2024-01-17 RX ADMIN — SODIUM CHLORIDE, PRESERVATIVE FREE 10 ML: 5 INJECTION INTRAVENOUS at 08:10

## 2024-01-17 RX ADMIN — CALCIUM GLUCONATE 1000 MG: 20 INJECTION, SOLUTION INTRAVENOUS at 17:46

## 2024-01-17 RX ADMIN — INSULIN LISPRO 4 UNITS: 100 INJECTION, SOLUTION INTRAVENOUS; SUBCUTANEOUS at 21:46

## 2024-01-17 RX ADMIN — ASPIRIN 81 MG CHEWABLE TABLET 81 MG: 81 TABLET CHEWABLE at 08:10

## 2024-01-17 RX ADMIN — IPRATROPIUM BROMIDE AND ALBUTEROL SULFATE 1 DOSE: .5; 2.5 SOLUTION RESPIRATORY (INHALATION) at 10:03

## 2024-01-17 RX ADMIN — ACETAMINOPHEN 650 MG: 325 TABLET ORAL at 23:28

## 2024-01-17 RX ADMIN — ATORVASTATIN CALCIUM 40 MG: 40 TABLET, FILM COATED ORAL at 08:10

## 2024-01-17 ASSESSMENT — PAIN DESCRIPTION - DESCRIPTORS: DESCRIPTORS: ACHING

## 2024-01-17 ASSESSMENT — PAIN DESCRIPTION - LOCATION: LOCATION: HEAD

## 2024-01-17 ASSESSMENT — PAIN SCALES - GENERAL: PAINLEVEL_OUTOF10: 9

## 2024-01-17 NOTE — FLOWSHEET NOTE
01/16/24 2030   Vital Signs   /64   Temp 97.5 °F (36.4 °C)   Pulse 81   Respirations 18   Post-Hemodialysis Assessment   Post-Treatment Procedures Blood returned;Access bleeding time < 10 minutes   Machine Disinfection Process Acid/Vinegar Clean;Heat Disinfect;Exterior Machine Disinfection   Rinseback Volume (ml) 300 ml   Blood Volume Processed (Liters) 69.5 L   Dialyzer Clearance Clear   Duration of Treatment (minutes) 180 minutes   Heparin Amount Administered During Treatment (mL) 0 mL   Hemodialysis Intake (ml) 300 ml   Hemodialysis Output (ml) 2300 ml   NET Removed (ml) 2000   Tolerated Treatment Good   Interventions Taken Ultrafiltration goal decreased   Patient Response to Treatment vitals stable, legs started cramping UF goal decreased, alert and orientated, transported back to floor   Bilateral Breath Sounds Diminished   Edema Generalized;Right upper extremity;Left upper extremity;Right lower extremity;Left lower extremity;Facial   Edema Generalized +2   RUE Edema +1   LUE Edema +1   RLE Edema +2   LLE Edema +2   Facial Edema +1   Physician Notified No   Time Off 2013   Patient Disposition Return to room   Observations & Evaluations   Level of Consciousness 0   Oriented X 3   Heart Rhythm Regular   Respiratory Quality/Effort Unlabored   O2 Device Nasal cannula   Skin Color Pink   Skin Condition/Temp Warm;Dry;Swollen/edematous   Appetite Good   Abdomen Inspection Obese;Rounded   Bowel Sounds (All Quadrants) Active   Comments nurse to nurse, vitals stable, transported to floor

## 2024-01-17 NOTE — PROGRESS NOTES
Educated patient on increased risk for bleeding and bleeding precautions. Verbalized understanding.

## 2024-01-17 NOTE — PROGRESS NOTES
Dialysis team spoke with Dr. Mcdowell regarding am labs. Pt will be having dialysis this afternoon.

## 2024-01-17 NOTE — DISCHARGE INSTRUCTIONS

## 2024-01-17 NOTE — PROGRESS NOTES
Pharmacy Consultation Note    Consult date: 1/17/2024  Physician/provider: Janet Araujo  Pharmacy has been consulted to evaluate criteria for Baricitinib therapy.    The patient DOES NOT currently meet [facility] P&T approved Covid-19 treatment criteria for Baricitinib due to renal function (eGFR 8). Please re-consult if the clinical condition changes and patient meets criteria for initiation based on [facility] P[employer] approved criteria for use.    Thank you for the consult,  hallie pandey RP

## 2024-01-17 NOTE — PLAN OF CARE
Problem: ABCDS Injury Assessment  Goal: Absence of physical injury  1/17/2024 1040 by Stephanie Perez RN  Outcome: Progressing  1/16/2024 2333 by Estefani Sweet RN  Outcome: Progressing     Problem: Discharge Planning  Goal: Discharge to home or other facility with appropriate resources  1/17/2024 1040 by Stephanie Perez RN  Outcome: Progressing  1/16/2024 2333 by Estefani Sweet RN  Outcome: Progressing

## 2024-01-17 NOTE — CARE COORDINATION
Social work / Discharge Planning:          COVID POSITIVE.    Patient is from home with her daughter in a one story home.    She has a ww and wc.    Patient has used [facility] in the past.   [facility]     Social work spoke to staff at [facility] [phone] and confirmed her dialysis schedule is TTS at 5:30am.    Update provided that patient is covid positive.   Currently on IV Decadron.   Electronically signed by HIDLA Cifuentes on 1/17/2024 at 10:04 AM

## 2024-01-17 NOTE — PROGRESS NOTES
Notified Lissette PSOEY regarding patient APTT level of 230.8 sec and K of 5.6mmol/l. See new orders.

## 2024-01-17 NOTE — PLAN OF CARE
Patient's chart updated to reflect:      .    - HF care plan, HF education points and HF discharge instructions.  -Orders: daily weights, I/O.  -PCP and/or Cardiologist appointment to be scheduled within 7 days of hospital discharge.  -History of HF, not primary admission Dx.  Patient admitted for treatment of fluid overload (dialysis patient)    Suzi Rai RN BSN  Heart Failure Navigator

## 2024-01-17 NOTE — PROGRESS NOTES
[facility] - Hospitalist   Progress Note    Admitting Date and Time: 1/16/2024  1:17 PM  Admit Dx: Hyperkalemia [E87.5]  Fluid overload [E87.70]  Hypervolemia, unspecified hypervolemia type [E87.70]  COVID-19 [U07.1]    Subjective:    Pt feels a little more SOB today. Was about to go to hemodialysis.       ROS: denies fever, chills, cp, sob, n/v, HA unless stated above.     calcium gluconate  1,000 mg IntraVENous Once    sodium chloride flush  5-40 mL IntraVENous 2 times per day    aspirin  81 mg Oral QAM    atorvastatin  40 mg Oral QAM    calcium acetate  2 capsule Oral TID WC    insulin glargine  10 Units SubCUTAneous QAM    prednisoLONE acetate  1 drop Both Eyes QAM    dexAMETHasone  6 mg IntraVENous Q24H    ipratropium 0.5 mg-albuterol 2.5 mg  1 Dose Inhalation Q4H WA RT    insulin lispro  0-16 Units SubCUTAneous TID WC    insulin lispro  0-4 Units SubCUTAneous Nightly     sodium chloride flush, 5-40 mL, PRN  sodium chloride, , PRN  ondansetron, 4 mg, Q8H PRN   Or  ondansetron, 4 mg, Q6H PRN  polyethylene glycol, 17 g, Daily PRN  acetaminophen, 650 mg, Q6H PRN   Or  acetaminophen, 650 mg, Q6H PRN  dextrose bolus, 125 mL, PRN   Or  dextrose bolus, 250 mL, PRN  glucagon (rDNA), 1 mg, PRN  dextrose, , Continuous PRN  midodrine, 10 mg, BID PRN  Glucose, 15 g, PRN  heparin (porcine), 80 Units/kg, PRN  heparin (porcine), 40 Units/kg, PRN         Objective:    BP (!) 92/47   Pulse (!) 43   Temp 98.1 °F (36.7 °C)   Resp 18   Ht 1.626 m (5' 4\")   Wt 115.7 kg (255 lb)   SpO2 98%   BMI 43.77 kg/m²     General Appearance: alert and oriented x3 in no acute distress  Skin: warm and dry, no rash or erythema, partially healed wounds on her L leg and several on her abdomen  Head: normocephalic and atraumatic  Eyes: pupils equal, round, extraocular eye movements intact, conjunctivae normal  Neck: supple and non-tender without mass, no cervical lymphadenopathy  Pulmonary/Chest: diminished but otherwise clear, no

## 2024-01-17 NOTE — CONSULTS
[facility]  MD Sidney Amezcua MD Ali Hassan, MD Lisa Kniska, CNP   Jaja Echavarria, YOSHI  Consultation  1/17/2024    Thank you for consult  Full note dictated, to follow  68-year-old woman known to me he undergoes chronic intermittent hemodialysis at the [facility] dialysis center on Tuesdays Thursdays Saturdays.  Admitted with dyspnea, cough.  Diagnosed with COVID.  Usually is edematous getting difficulties with fluid and salt restrictions as an outpatient, as well as intradialytic hypotension.    Feels \"weird\" after receiving albuterol treatment.  Has some periorbital dysesthesias.  Has palpitations.    Potassium 5.9 presentation, 5.6 this morning despite 3-hour dialysis treatment yesterday    A/R:  1.  ESRD  2.  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  Hyperkalemia  5.  Edema, chronic  6.  Palpitations and perioral dysesthesias after albuterol.  Likely side effect of the medication.  Has history of PVCs.  Regular rhythm currently with frequent PVCs    Calcium gluconate IV x 1  Dialysis today  Resume TTS schedule tomorrow  Continue other aspects of renal management  Follow labs BP    Sidney Mcdowell MD

## 2024-01-17 NOTE — FLOWSHEET NOTE
01/17/24 1521   Vital Signs   BP (!) 119/47   Temp 98 °F (36.7 °C)   Pulse 52   Respirations 16   Weight - Scale 115.3 kg (254 lb 3.1 oz)   Weight Method Estimated   Percent Weight Change -1.87   Pain Assessment   Pain Assessment None - Denies Pain   Post-Hemodialysis Assessment   Post-Treatment Procedures Blood returned;Access bleeding time < 10 minutes   Machine Disinfection Process Acid/Vinegar Clean;Heat Disinfect;Exterior Machine Disinfection   Rinseback Volume (ml) 300 ml   Blood Volume Processed (Liters) 66.5 L   Dialyzer Clearance Clear   Duration of Treatment (minutes) 180 minutes   Hemodialysis Intake (ml) 300 ml   Hemodialysis Output (ml) 2500 ml   NET Removed (ml) 2200   Tolerated Treatment Good   Patient Response to Treatment tolerated tx, hemostasis achieved, VSS, stable at dc   Bilateral Breath Sounds Diminished   Edema Right lower extremity;Left lower extremity   Time Off 1516   Patient Disposition Return to room   Observations & Evaluations   Level of Consciousness 0   Oriented X 3   Heart Rhythm Regular   Respiratory Quality/Effort Unlabored   O2 Device Nasal cannula   Skin Color Pink   Skin Condition/Temp Dry;Warm   Comments stable at dc

## 2024-01-17 NOTE — PROGRESS NOTES
4 Eyes Skin Assessment     NAME:  Lizette Murray  YOB: 1955  MEDICAL RECORD NUMBER:  [id number]    The patient is being assessed for  Admission    I agree that at least one RN has performed a thorough Head to Toe Skin Assessment on the patient. ALL assessment sites listed below have been assessed.      Areas assessed by both nurses:    Head, Face, Ears, Shoulders, Back, Chest, Arms, Elbows, Hands, Sacrum. Buttock, Coccyx, Ischium, Legs. Feet and Heels, and Under Medical Devices         Does the Patient have a Wound? No noted wound(s)       Dm Prevention initiated by RN: No  Wound Care Orders initiated by RN: No    Pressure Injury (Stage 3,4, Unstageable, DTI, NWPT, and Complex wounds) if present, place Wound referral order by RN under : No    New Ostomies, if present place, Ostomy referral order under : No     Nurse 1 eSignature: Electronically signed by Estefani Sweet RN on 1/16/24 at 11:16 PM EST    **SHARE this note so that the co-signing nurse can place an eSignature**    Nurse 2 eSignature: {Esignature:[id number]}

## 2024-01-18 ENCOUNTER — APPOINTMENT (OUTPATIENT)
Dept: CT IMAGING | Age: 69
DRG: 177 | End: 2024-01-18
Payer: MEDICARE

## 2024-01-18 LAB
ANION GAP SERPL CALCULATED.3IONS-SCNC: 16 MMOL/L (ref 7–16)
BASOPHILS # BLD: 0.01 K/UL (ref 0–0.2)
BASOPHILS NFR BLD: 0 % (ref 0–2)
BUN SERPL-MCNC: 33 MG/DL (ref 6–23)
CALCIUM SERPL-MCNC: 9.3 MG/DL (ref 8.6–10.2)
CHLORIDE SERPL-SCNC: 93 MMOL/L (ref 98–107)
CO2 SERPL-SCNC: 27 MMOL/L (ref 22–29)
CREAT SERPL-MCNC: 4.6 MG/DL (ref 0.5–1)
EOSINOPHIL # BLD: 0 K/UL (ref 0.05–0.5)
EOSINOPHILS RELATIVE PERCENT: 0 % (ref 0–6)
ERYTHROCYTE [DISTWIDTH] IN BLOOD BY AUTOMATED COUNT: 15.1 % (ref 11.5–15)
GFR SERPL CREATININE-BSD FRML MDRD: 10 ML/MIN/1.73M2
GLUCOSE BLD-MCNC: 129 MG/DL (ref 74–99)
GLUCOSE BLD-MCNC: 262 MG/DL (ref 74–99)
GLUCOSE BLD-MCNC: 299 MG/DL (ref 74–99)
GLUCOSE BLD-MCNC: 313 MG/DL (ref 74–99)
GLUCOSE SERPL-MCNC: 261 MG/DL (ref 74–99)
HCT VFR BLD AUTO: 32.5 % (ref 34–48)
HGB BLD-MCNC: 10.2 G/DL (ref 11.5–15.5)
IMM GRANULOCYTES # BLD AUTO: 0.04 K/UL (ref 0–0.58)
IMM GRANULOCYTES NFR BLD: 1 % (ref 0–5)
LYMPHOCYTES NFR BLD: 0.74 K/UL (ref 1.5–4)
LYMPHOCYTES RELATIVE PERCENT: 9 % (ref 20–42)
MCH RBC QN AUTO: 32.8 PG (ref 26–35)
MCHC RBC AUTO-ENTMCNC: 31.4 G/DL (ref 32–34.5)
MCV RBC AUTO: 104.5 FL (ref 80–99.9)
MONOCYTES NFR BLD: 0.37 K/UL (ref 0.1–0.95)
MONOCYTES NFR BLD: 5 % (ref 2–12)
NEUTROPHILS NFR BLD: 86 % (ref 43–80)
NEUTS SEG NFR BLD: 6.82 K/UL (ref 1.8–7.3)
PARTIAL THROMBOPLASTIN TIME: 49.6 SEC (ref 24.5–35.1)
PARTIAL THROMBOPLASTIN TIME: 51.5 SEC (ref 24.5–35.1)
PLATELET # BLD AUTO: 125 K/UL (ref 130–450)
PMV BLD AUTO: 11.3 FL (ref 7–12)
POTASSIUM SERPL-SCNC: 4.8 MMOL/L (ref 3.5–5)
RBC # BLD AUTO: 3.11 M/UL (ref 3.5–5.5)
SODIUM SERPL-SCNC: 136 MMOL/L (ref 132–146)
WBC OTHER # BLD: 8 K/UL (ref 4.5–11.5)

## 2024-01-18 PROCEDURE — 2500000003 HC RX 250 WO HCPCS: Performed by: STUDENT IN AN ORGANIZED HEALTH CARE EDUCATION/TRAINING PROGRAM

## 2024-01-18 PROCEDURE — 80048 BASIC METABOLIC PNL TOTAL CA: CPT

## 2024-01-18 PROCEDURE — 90935 HEMODIALYSIS ONE EVALUATION: CPT

## 2024-01-18 PROCEDURE — 36415 COLL VENOUS BLD VENIPUNCTURE: CPT

## 2024-01-18 PROCEDURE — 1200000000 HC SEMI PRIVATE

## 2024-01-18 PROCEDURE — 85730 THROMBOPLASTIN TIME PARTIAL: CPT

## 2024-01-18 PROCEDURE — 6370000000 HC RX 637 (ALT 250 FOR IP): Performed by: STUDENT IN AN ORGANIZED HEALTH CARE EDUCATION/TRAINING PROGRAM

## 2024-01-18 PROCEDURE — 71275 CT ANGIOGRAPHY CHEST: CPT

## 2024-01-18 PROCEDURE — 85025 COMPLETE CBC W/AUTO DIFF WBC: CPT

## 2024-01-18 PROCEDURE — 94640 AIRWAY INHALATION TREATMENT: CPT

## 2024-01-18 PROCEDURE — 2700000000 HC OXYGEN THERAPY PER DAY

## 2024-01-18 PROCEDURE — 6360000002 HC RX W HCPCS: Performed by: STUDENT IN AN ORGANIZED HEALTH CARE EDUCATION/TRAINING PROGRAM

## 2024-01-18 PROCEDURE — 82962 GLUCOSE BLOOD TEST: CPT

## 2024-01-18 PROCEDURE — 99233 SBSQ HOSP IP/OBS HIGH 50: CPT | Performed by: STUDENT IN AN ORGANIZED HEALTH CARE EDUCATION/TRAINING PROGRAM

## 2024-01-18 PROCEDURE — 2580000003 HC RX 258: Performed by: STUDENT IN AN ORGANIZED HEALTH CARE EDUCATION/TRAINING PROGRAM

## 2024-01-18 PROCEDURE — 6360000004 HC RX CONTRAST MEDICATION: Performed by: RADIOLOGY

## 2024-01-18 RX ORDER — ENOXAPARIN SODIUM 100 MG/ML
40 INJECTION SUBCUTANEOUS DAILY
Status: DISCONTINUED | OUTPATIENT
Start: 2024-01-18 | End: 2024-01-18 | Stop reason: ALTCHOICE

## 2024-01-18 RX ORDER — HEPARIN SODIUM 10000 [USP'U]/ML
5000 INJECTION, SOLUTION INTRAVENOUS; SUBCUTANEOUS EVERY 8 HOURS
Status: DISCONTINUED | OUTPATIENT
Start: 2024-01-18 | End: 2024-01-20 | Stop reason: HOSPADM

## 2024-01-18 RX ORDER — LORAZEPAM 2 MG/ML
0.5 INJECTION INTRAMUSCULAR EVERY 6 HOURS PRN
Status: DISCONTINUED | OUTPATIENT
Start: 2024-01-18 | End: 2024-01-20 | Stop reason: HOSPADM

## 2024-01-18 RX ADMIN — ACETAMINOPHEN 650 MG: 325 TABLET ORAL at 21:31

## 2024-01-18 RX ADMIN — INSULIN LISPRO 4 UNITS: 100 INJECTION, SOLUTION INTRAVENOUS; SUBCUTANEOUS at 21:40

## 2024-01-18 RX ADMIN — HEPARIN SODIUM 11 UNITS/KG/HR: 10000 INJECTION, SOLUTION INTRAVENOUS at 10:39

## 2024-01-18 RX ADMIN — ASPIRIN 81 MG CHEWABLE TABLET 81 MG: 81 TABLET CHEWABLE at 08:17

## 2024-01-18 RX ADMIN — CALCIUM ACETATE 1334 MG: 667 CAPSULE ORAL at 06:45

## 2024-01-18 RX ADMIN — LORAZEPAM 0.5 MG: 2 INJECTION INTRAMUSCULAR; INTRAVENOUS at 14:34

## 2024-01-18 RX ADMIN — IPRATROPIUM BROMIDE AND ALBUTEROL SULFATE 1 DOSE: .5; 2.5 SOLUTION RESPIRATORY (INHALATION) at 09:02

## 2024-01-18 RX ADMIN — IPRATROPIUM BROMIDE AND ALBUTEROL SULFATE 1 DOSE: .5; 2.5 SOLUTION RESPIRATORY (INHALATION) at 12:44

## 2024-01-18 RX ADMIN — ATORVASTATIN CALCIUM 40 MG: 40 TABLET, FILM COATED ORAL at 08:17

## 2024-01-18 RX ADMIN — DEXAMETHASONE SODIUM PHOSPHATE 6 MG: 10 INJECTION INTRAMUSCULAR; INTRAVENOUS at 21:32

## 2024-01-18 RX ADMIN — INSULIN GLARGINE 10 UNITS: 100 INJECTION, SOLUTION SUBCUTANEOUS at 08:17

## 2024-01-18 RX ADMIN — INSULIN LISPRO 8 UNITS: 100 INJECTION, SOLUTION INTRAVENOUS; SUBCUTANEOUS at 06:40

## 2024-01-18 RX ADMIN — SODIUM CHLORIDE, PRESERVATIVE FREE 10 ML: 5 INJECTION INTRAVENOUS at 08:17

## 2024-01-18 RX ADMIN — IOPAMIDOL 75 ML: 755 INJECTION, SOLUTION INTRAVENOUS at 14:56

## 2024-01-18 RX ADMIN — CALCIUM ACETATE 1334 MG: 667 CAPSULE ORAL at 18:10

## 2024-01-18 RX ADMIN — HEPARIN SODIUM 4630 UNITS: 1000 INJECTION INTRAVENOUS; SUBCUTANEOUS at 10:37

## 2024-01-18 RX ADMIN — SODIUM CHLORIDE, PRESERVATIVE FREE 10 ML: 5 INJECTION INTRAVENOUS at 21:32

## 2024-01-18 RX ADMIN — INSULIN LISPRO 8 UNITS: 100 INJECTION, SOLUTION INTRAVENOUS; SUBCUTANEOUS at 10:42

## 2024-01-18 ASSESSMENT — PAIN SCALES - GENERAL: PAINLEVEL_OUTOF10: 7

## 2024-01-18 ASSESSMENT — PAIN DESCRIPTION - LOCATION: LOCATION: JAW

## 2024-01-18 NOTE — CARE COORDINATION
Social work / Discharge Planning:      Patient is from home with her daughter and uses a ww and wc.     She goes to [facility] [phone] TTS at 5:30am.      [facility]     She is on IV Decadron and Heparin drip.     Per IDR, plan is for CT chest.    Electronically signed by HILDA Cifuentes on 1/18/2024 at 9:47 AM

## 2024-01-18 NOTE — PROGRESS NOTES
[facility] Hospitalist Progress Note    Admitting Date and Time: 1/16/2024  1:17 PM  Admit Dx: Hyperkalemia [E87.5]  Fluid overload [E87.70]  Hypervolemia, unspecified hypervolemia type [E87.70]  COVID-19 [U07.1]    Subjective:    Patient was seen and examined bedside.  No acute event overnight       enoxaparin  40 mg SubCUTAneous Daily    sodium chloride flush  5-40 mL IntraVENous 2 times per day    aspirin  81 mg Oral QAM    atorvastatin  40 mg Oral QAM    calcium acetate  2 capsule Oral TID WC    insulin glargine  10 Units SubCUTAneous QAM    prednisoLONE acetate  1 drop Both Eyes QAM    dexAMETHasone  6 mg IntraVENous Q24H    ipratropium 0.5 mg-albuterol 2.5 mg  1 Dose Inhalation Q4H WA RT    insulin lispro  0-16 Units SubCUTAneous TID WC    insulin lispro  0-4 Units SubCUTAneous Nightly     LORazepam, 0.5 mg, Q6H PRN  sodium chloride flush, 5-40 mL, PRN  sodium chloride, , PRN  ondansetron, 4 mg, Q8H PRN   Or  ondansetron, 4 mg, Q6H PRN  polyethylene glycol, 17 g, Daily PRN  acetaminophen, 650 mg, Q6H PRN   Or  acetaminophen, 650 mg, Q6H PRN  dextrose bolus, 125 mL, PRN   Or  dextrose bolus, 250 mL, PRN  glucagon (rDNA), 1 mg, PRN  dextrose, , Continuous PRN  midodrine, 10 mg, BID PRN  Glucose, 15 g, PRN  heparin (porcine), 80 Units/kg, PRN  heparin (porcine), 40 Units/kg, PRN         Objective:    BP (!) 136/56   Pulse (!) 46   Temp 97.7 °F (36.5 °C)   Resp 18   Ht 1.626 m (5' 4\")   Wt 115.8 kg (255 lb 4.7 oz)   SpO2 92%   BMI 43.82 kg/m²     General Appearance: alert and oriented to person, place and time and in no acute distress  Skin: warm and dry  Head: normocephalic and atraumatic  Eyes: pupils equal, round, and reactive to light, extraocular eye movements intact, conjunctivae normal  Neck: neck supple and non tender without mass   Pulmonary/Chest: clear to auscultation bilaterally- no wheezes, rales or rhonchi, normal air movement, no respiratory distress  Cardiovascular: normal rate,  normal S1 and S2 and no carotid bruits  Abdomen: soft, non-tender, non-distended, normal bowel sounds, no masses or organomegaly  Extremities: no cyanosis, no clubbing and no edema  Neurologic: no cranial nerve deficit and speech normal        Recent Labs     01/16/24  1338 01/17/24  0443 01/18/24  0605    136 136   K 5.8* 5.6* 4.8   CL 95* 93* 93*   CO2 27 29 27   BUN 68* 36* 33*   CREATININE 8.4* 5.5* 4.6*   GLUCOSE 165* 304* 261*   CALCIUM 9.3 8.9 9.3       Recent Labs     01/16/24  1338 01/16/24  2140 01/17/24  0443 01/18/24  0605   WBC 8.4 9.1 8.6 8.0   RBC 3.11* 3.18* 3.11* 3.11*   HGB 10.3* 10.3* 10.2* 10.2*   HCT 32.3* 32.9* 31.9* 32.5*   .9* 103.5* 102.6* 104.5*   MCH 33.1 32.4 32.8 32.8   MCHC 31.9* 31.3* 32.0 31.4*   RDW 15.0 14.9 14.9 15.1*     --  128* 125*   MPV 10.7 10.4 10.6 11.3       Radiology: na     Assessment:    Principal Problem:    Fluid overload  Active Problems:    ESRD on hemodialysis (HCC)    COVID-19    Macrocytosis    Chronic anemia  Resolved Problems:    * No resolved hospital problems. *      Plan:  Acute on chronic hypoxic respiratory failure 2/2 COVID-19 infection.   Cont decadron   Incentive spirometer  Wean O2 as tolerated  The angio was negative for acute PE.    ESRD on HD  This is scheduled per nephrology.     DM  Home meds lantus 10 units QAM, humalog 15 units TIDAC + ISS  Will continue lantus and high ISS with hypoglycemic protocol  A1C 7.4  Diabetic diet     HLD  Continue statin     Chronic anemia 2/2 CKD   Around baseline hgb (9-10) however now with macrocytosis so will B12 & folate WNL  Patient denies alcohol use  Monitor CBC daily      NOTE: This report was transcribed using voice recognition software. Every effort was made to ensure accuracy; however, inadvertent computerized transcription errors may be present.  Electronically signed by Juve Mazariegos DO on 1/18/2024 at 4:49 PM

## 2024-01-18 NOTE — FLOWSHEET NOTE
01/18/24 1838   Vital Signs   BP 97/61   Temp 97.4 °F (36.3 °C)   Pulse 52   Respirations 16   Weight - Scale 113.9 kg (251 lb 1.7 oz)   Weight Method Bed scale   Percent Weight Change -1.64   Pain Assessment   Pain Assessment None - Denies Pain   Post-Hemodialysis Assessment   Post-Treatment Procedures Blood returned;Access bleeding time < 10 minutes   Machine Disinfection Process Acid/Vinegar Clean;Heat Disinfect;Exterior Machine Disinfection   Blood Volume Processed (Liters) 60.5 L   Dialyzer Clearance Lightly streaked   Duration of Treatment (minutes) 180 minutes   Hemodialysis Intake (ml) 300 ml   Hemodialysis Output (ml) 2200 ml   NET Removed (ml) 1900   Tolerated Treatment Good   Bilateral Breath Sounds Clear;Diminished   Edema Right upper extremity;Left upper extremity;Right lower extremity;Left lower extremity   RUE Edema +1   LUE Edema +1   RLE Edema +1   LLE Edema +1   Time Off 1826   Patient Disposition Return to room   Observations & Evaluations   Level of Consciousness 0   Heart Rhythm Regular   Respiratory Quality/Effort Unlabored   O2 Device Nasal cannula   Skin Color Pink   Skin Condition/Temp Dry;Warm   Abdomen Inspection Soft   Bowel Sounds (All Quadrants) Active

## 2024-01-18 NOTE — PROGRESS NOTES
DVT Prophylaxis Adjustment Policy (DVT Prophylaxis)     This patient is on DVT Prophylaxis medication that requires a dose adjustment      Date Body Weight IBW  Adjusted BW SCr  CrCl Dialysis status   1/18/2024 115.8 kg (255 lb 4.7 oz) Ideal body weight: 54.7 kg (120 lb 9.5 oz)  Adjusted ideal body weight: 79.1 kg (174 lb 7.6 oz) Serum creatinine: 4.6 mg/dL (H) 01/18/24 0605  Estimated creatinine clearance: 15 mL/min (A) HD       Pharmacy has dose-adjusted the DVT Prophylaxis regimen to match   the recommendations from the following table        Ordered Medication:Lovenox 40mg daily    Order Changed/converted to: Heparin 5000 Units TID      These changes were made per protocol according to the [facility] Pharmacist   Review for Appropriate Use and Automatic Dose Adjustments of   Subcutaneous Anticoagulants Policy     *Please note this dose may need readjusted if patient's condition changes.    Please contact pharmacy with any questions regarding these changes.    Petr Wilson RP  1/18/2024  4:53 PM

## 2024-01-18 NOTE — PLAN OF CARE
Problem: ABCDS Injury Assessment  Goal: Absence of physical injury  1/18/2024 1026 by Stephanie Perez RN  Outcome: Progressing  1/18/2024 0038 by Delfina Jefferson RN  Outcome: Progressing     Problem: Discharge Planning  Goal: Discharge to home or other facility with appropriate resources  1/18/2024 1026 by Stephanie Perez, RN  Outcome: Progressing  1/18/2024 0038 by Delfina Jefferson, RN  Outcome: Progressing

## 2024-01-18 NOTE — PLAN OF CARE
Problem: ABCDS Injury Assessment  Goal: Absence of physical injury  1/18/2024 0038 by Delfina Jefferson, RN  Outcome: Progressing  1/17/2024 1040 by Stephanie Perez RN  Outcome: Progressing     Problem: Discharge Planning  Goal: Discharge to home or other facility with appropriate resources  1/18/2024 0038 by Delfina Jefferson RN  Outcome: Progressing  1/17/2024 1040 by Stephanie Perez RN  Outcome: Progressing     Problem: Pain  Goal: Verbalizes/displays adequate comfort level or baseline comfort level  Outcome: Progressing     Problem: Safety - Adult  Goal: Free from fall injury  Outcome: Progressing     Problem: Chronic Conditions and Co-morbidities  Goal: Patient's chronic conditions and co-morbidity symptoms are monitored and maintained or improved  Outcome: Progressing

## 2024-01-19 LAB
ANION GAP SERPL CALCULATED.3IONS-SCNC: 15 MMOL/L (ref 7–16)
BASOPHILS # BLD: 0.04 K/UL (ref 0–0.2)
BASOPHILS NFR BLD: 0 % (ref 0–2)
BUN SERPL-MCNC: 29 MG/DL (ref 6–23)
CALCIUM SERPL-MCNC: 9.3 MG/DL (ref 8.6–10.2)
CHLORIDE SERPL-SCNC: 91 MMOL/L (ref 98–107)
CO2 SERPL-SCNC: 29 MMOL/L (ref 22–29)
CREAT SERPL-MCNC: 3.9 MG/DL (ref 0.5–1)
EKG ATRIAL RATE: 92 BPM
EKG P AXIS: -18 DEGREES
EKG P-R INTERVAL: 184 MS
EKG Q-T INTERVAL: 408 MS
EKG QRS DURATION: 100 MS
EKG QTC CALCULATION (BAZETT): 504 MS
EKG R AXIS: -15 DEGREES
EKG T AXIS: 94 DEGREES
EKG VENTRICULAR RATE: 92 BPM
EOSINOPHIL # BLD: 0.03 K/UL (ref 0.05–0.5)
EOSINOPHILS RELATIVE PERCENT: 0 % (ref 0–6)
ERYTHROCYTE [DISTWIDTH] IN BLOOD BY AUTOMATED COUNT: 15.3 % (ref 11.5–15)
GFR SERPL CREATININE-BSD FRML MDRD: 12 ML/MIN/1.73M2
GLUCOSE BLD-MCNC: 211 MG/DL (ref 74–99)
GLUCOSE BLD-MCNC: 300 MG/DL (ref 74–99)
GLUCOSE BLD-MCNC: 355 MG/DL (ref 74–99)
GLUCOSE BLD-MCNC: 364 MG/DL (ref 74–99)
GLUCOSE SERPL-MCNC: 330 MG/DL (ref 74–99)
HCT VFR BLD AUTO: 35.3 % (ref 34–48)
HGB BLD-MCNC: 11.1 G/DL (ref 11.5–15.5)
IMM GRANULOCYTES # BLD AUTO: 0.07 K/UL (ref 0–0.58)
IMM GRANULOCYTES NFR BLD: 1 % (ref 0–5)
LYMPHOCYTES NFR BLD: 0.74 K/UL (ref 1.5–4)
LYMPHOCYTES RELATIVE PERCENT: 6 % (ref 20–42)
MCH RBC QN AUTO: 32.5 PG (ref 26–35)
MCHC RBC AUTO-ENTMCNC: 31.4 G/DL (ref 32–34.5)
MCV RBC AUTO: 103.2 FL (ref 80–99.9)
MONOCYTES NFR BLD: 0.51 K/UL (ref 0.1–0.95)
MONOCYTES NFR BLD: 4 % (ref 2–12)
NEUTROPHILS NFR BLD: 89 % (ref 43–80)
NEUTS SEG NFR BLD: 10.62 K/UL (ref 1.8–7.3)
PLATELET, FLUORESCENCE: 131 K/UL (ref 130–450)
PMV BLD AUTO: 11.2 FL (ref 7–12)
POTASSIUM SERPL-SCNC: 4.7 MMOL/L (ref 3.5–5)
RBC # BLD AUTO: 3.42 M/UL (ref 3.5–5.5)
SODIUM SERPL-SCNC: 135 MMOL/L (ref 132–146)
WBC OTHER # BLD: 12 K/UL (ref 4.5–11.5)

## 2024-01-19 PROCEDURE — 99238 HOSP IP/OBS DSCHRG MGMT 30/<: CPT | Performed by: STUDENT IN AN ORGANIZED HEALTH CARE EDUCATION/TRAINING PROGRAM

## 2024-01-19 PROCEDURE — 2580000003 HC RX 258: Performed by: STUDENT IN AN ORGANIZED HEALTH CARE EDUCATION/TRAINING PROGRAM

## 2024-01-19 PROCEDURE — 82962 GLUCOSE BLOOD TEST: CPT

## 2024-01-19 PROCEDURE — 2700000000 HC OXYGEN THERAPY PER DAY

## 2024-01-19 PROCEDURE — 2500000003 HC RX 250 WO HCPCS: Performed by: STUDENT IN AN ORGANIZED HEALTH CARE EDUCATION/TRAINING PROGRAM

## 2024-01-19 PROCEDURE — 80048 BASIC METABOLIC PNL TOTAL CA: CPT

## 2024-01-19 PROCEDURE — 93010 ELECTROCARDIOGRAM REPORT: CPT | Performed by: INTERNAL MEDICINE

## 2024-01-19 PROCEDURE — 6360000002 HC RX W HCPCS: Performed by: STUDENT IN AN ORGANIZED HEALTH CARE EDUCATION/TRAINING PROGRAM

## 2024-01-19 PROCEDURE — 1200000000 HC SEMI PRIVATE

## 2024-01-19 PROCEDURE — 93005 ELECTROCARDIOGRAM TRACING: CPT | Performed by: NURSE PRACTITIONER

## 2024-01-19 PROCEDURE — 85025 COMPLETE CBC W/AUTO DIFF WBC: CPT

## 2024-01-19 PROCEDURE — 6370000000 HC RX 637 (ALT 250 FOR IP): Performed by: STUDENT IN AN ORGANIZED HEALTH CARE EDUCATION/TRAINING PROGRAM

## 2024-01-19 PROCEDURE — 94640 AIRWAY INHALATION TREATMENT: CPT

## 2024-01-19 RX ADMIN — HEPARIN SODIUM 5000 UNITS: 10000 INJECTION INTRAVENOUS; SUBCUTANEOUS at 16:49

## 2024-01-19 RX ADMIN — IPRATROPIUM BROMIDE AND ALBUTEROL SULFATE 1 DOSE: .5; 2.5 SOLUTION RESPIRATORY (INHALATION) at 13:26

## 2024-01-19 RX ADMIN — INSULIN LISPRO 4 UNITS: 100 INJECTION, SOLUTION INTRAVENOUS; SUBCUTANEOUS at 11:34

## 2024-01-19 RX ADMIN — SODIUM CHLORIDE, PRESERVATIVE FREE 10 ML: 5 INJECTION INTRAVENOUS at 07:49

## 2024-01-19 RX ADMIN — ACETAMINOPHEN 650 MG: 325 TABLET ORAL at 22:06

## 2024-01-19 RX ADMIN — INSULIN LISPRO 12 UNITS: 100 INJECTION, SOLUTION INTRAVENOUS; SUBCUTANEOUS at 16:49

## 2024-01-19 RX ADMIN — CALCIUM ACETATE 1334 MG: 667 CAPSULE ORAL at 16:48

## 2024-01-19 RX ADMIN — INSULIN LISPRO 16 UNITS: 100 INJECTION, SOLUTION INTRAVENOUS; SUBCUTANEOUS at 06:21

## 2024-01-19 RX ADMIN — IPRATROPIUM BROMIDE AND ALBUTEROL SULFATE 1 DOSE: .5; 2.5 SOLUTION RESPIRATORY (INHALATION) at 16:43

## 2024-01-19 RX ADMIN — CALCIUM ACETATE 1334 MG: 667 CAPSULE ORAL at 11:34

## 2024-01-19 RX ADMIN — ATORVASTATIN CALCIUM 40 MG: 40 TABLET, FILM COATED ORAL at 07:48

## 2024-01-19 RX ADMIN — HEPARIN SODIUM 5000 UNITS: 10000 INJECTION INTRAVENOUS; SUBCUTANEOUS at 09:07

## 2024-01-19 RX ADMIN — IPRATROPIUM BROMIDE AND ALBUTEROL SULFATE 1 DOSE: .5; 2.5 SOLUTION RESPIRATORY (INHALATION) at 09:31

## 2024-01-19 RX ADMIN — INSULIN LISPRO 4 UNITS: 100 INJECTION, SOLUTION INTRAVENOUS; SUBCUTANEOUS at 22:10

## 2024-01-19 RX ADMIN — INSULIN GLARGINE 10 UNITS: 100 INJECTION, SOLUTION SUBCUTANEOUS at 07:52

## 2024-01-19 RX ADMIN — SODIUM CHLORIDE, PRESERVATIVE FREE 10 ML: 5 INJECTION INTRAVENOUS at 22:06

## 2024-01-19 RX ADMIN — ASPIRIN 81 MG CHEWABLE TABLET 81 MG: 81 TABLET CHEWABLE at 07:48

## 2024-01-19 RX ADMIN — HEPARIN SODIUM 5000 UNITS: 10000 INJECTION INTRAVENOUS; SUBCUTANEOUS at 01:51

## 2024-01-19 RX ADMIN — DEXAMETHASONE SODIUM PHOSPHATE 6 MG: 10 INJECTION INTRAMUSCULAR; INTRAVENOUS at 16:49

## 2024-01-19 RX ADMIN — CALCIUM ACETATE 1334 MG: 667 CAPSULE ORAL at 07:48

## 2024-01-19 RX ADMIN — IPRATROPIUM BROMIDE AND ALBUTEROL SULFATE 1 DOSE: .5; 2.5 SOLUTION RESPIRATORY (INHALATION) at 20:01

## 2024-01-19 ASSESSMENT — PAIN DESCRIPTION - DESCRIPTORS: DESCRIPTORS: DISCOMFORT

## 2024-01-19 ASSESSMENT — PAIN SCALES - GENERAL: PAINLEVEL_OUTOF10: 0

## 2024-01-19 ASSESSMENT — PAIN DESCRIPTION - LOCATION: LOCATION: BUTTOCKS

## 2024-01-19 NOTE — PROGRESS NOTES
[facility]  MD Sidney Amezcua MD Ali Hassan, MD Lisa Kniska, CNP   Jaja Echavarria, YOSHI Hope, CASH  Progress Note    1/18/2024    SUBJECTIVE:   1/18: Seen this early afternoon.  Awaiting CT scan.  Anxious but otherwise denies other complaint.  Swelling still present in lower extremities.  Dyspnea with even minimal exertion such as walking 6 feet.   (-) sob/johnson/cp/palp Appetite ok    PROBLEM LIST:    Principal Problem:    Fluid overload  Active Problems:    ESRD on hemodialysis (HCC)    COVID-19    Macrocytosis    Chronic anemia  Resolved Problems:    * No resolved hospital problems. *         DIET:    ADULT DIET; Regular; 4 carb choices (60 gm/meal); Low Potassium (Less than 3000 mg/day); Low Phosphorus (Less than 1000 mg)     MEDS (scheduled):    heparin (porcine)  5,000 Units SubCUTAneous Q8H    sodium chloride flush  5-40 mL IntraVENous 2 times per day    aspirin  81 mg Oral QAM    atorvastatin  40 mg Oral QAM    calcium acetate  2 capsule Oral TID WC    insulin glargine  10 Units SubCUTAneous QAM    prednisoLONE acetate  1 drop Both Eyes QAM    dexAMETHasone  6 mg IntraVENous Q24H    ipratropium 0.5 mg-albuterol 2.5 mg  1 Dose Inhalation Q4H WA RT    insulin lispro  0-16 Units SubCUTAneous TID WC    insulin lispro  0-4 Units SubCUTAneous Nightly       MEDS (infusions):   sodium chloride      dextrose         MEDS (prn):  LORazepam, sodium chloride flush, sodium chloride, ondansetron **OR** ondansetron, polyethylene glycol, acetaminophen **OR** acetaminophen, dextrose bolus **OR** dextrose bolus, glucagon (rDNA), dextrose, midodrine, Glucose    PHYSICAL EXAM:     Patient Vitals for the past 24 hrs:   BP Temp Temp src Pulse Resp SpO2 Weight   01/18/24 1838 97/61 97.4 °F (36.3 °C) -- 52 16 -- 113.9 kg (251 lb 1.7 oz)   01/18/24 1800 (!) 88/62 -- -- 61 -- -- --   01/18/24 1730 111/63 -- -- 60 -- -- --   01/18/24 1700 (!) 117/58 -- -- (!) 42 -- -- --   01/18/24

## 2024-01-19 NOTE — PROGRESS NOTES
[facility] Hospitalist Progress Note    Admitting Date and Time: 1/16/2024  1:17 PM  Admit Dx: Hyperkalemia [E87.5]  Fluid overload [E87.70]  Hypervolemia, unspecified hypervolemia type [E87.70]  COVID-19 [U07.1]    Subjective:    Patient was seen and examined bedside  She says she has increased cough and short of breath overnight.  She is still on 3 to 4 L oxygen this morning          ROS: denies fever, chills, cp, sob, n/v, HA unless stated above.      heparin (porcine)  5,000 Units SubCUTAneous Q8H    sodium chloride flush  5-40 mL IntraVENous 2 times per day    aspirin  81 mg Oral QAM    atorvastatin  40 mg Oral QAM    calcium acetate  2 capsule Oral TID WC    insulin glargine  10 Units SubCUTAneous QAM    prednisoLONE acetate  1 drop Both Eyes QAM    dexAMETHasone  6 mg IntraVENous Q24H    ipratropium 0.5 mg-albuterol 2.5 mg  1 Dose Inhalation Q4H WA RT    insulin lispro  0-16 Units SubCUTAneous TID WC    insulin lispro  0-4 Units SubCUTAneous Nightly     LORazepam, 0.5 mg, Q6H PRN  sodium chloride flush, 5-40 mL, PRN  sodium chloride, , PRN  ondansetron, 4 mg, Q8H PRN   Or  ondansetron, 4 mg, Q6H PRN  polyethylene glycol, 17 g, Daily PRN  acetaminophen, 650 mg, Q6H PRN   Or  acetaminophen, 650 mg, Q6H PRN  dextrose bolus, 125 mL, PRN   Or  dextrose bolus, 250 mL, PRN  glucagon (rDNA), 1 mg, PRN  dextrose, , Continuous PRN  midodrine, 10 mg, BID PRN  Glucose, 15 g, PRN         Objective:    /60   Pulse 92   Temp 97.7 °F (36.5 °C) (Oral)   Resp 18   Ht 1.626 m (5' 4\")   Wt 116.6 kg (257 lb 1.6 oz)   SpO2 97%   BMI 44.13 kg/m²     General Appearance: alert and oriented to person, place and time and in no acute distress  Skin: warm and dry  Head: normocephalic and atraumatic  Eyes: pupils equal, round, and reactive to light, extraocular eye movements intact, conjunctivae normal  Neck: neck supple and non tender without mass   Pulmonary/Chest: clear to auscultation bilaterally- no wheezes,

## 2024-01-19 NOTE — CARE COORDINATION
Social work / Discharge planning:          COVID POSITIVE.    Discharge plan is home with her daughter.   She goes to [facility] [phone].   They will need to be informed when patient is discharged.      Her baseline oxygen is 2 liters from [employer].     Patient uses a wc and ww at home.    Electronically signed by HILDA Cifuentes on 1/19/2024 at 9:57 AM

## 2024-01-19 NOTE — PLAN OF CARE
Problem: ABCDS Injury Assessment  Goal: Absence of physical injury  1/19/2024 0932 by Ricardo Mistry RN  Outcome: Progressing  1/19/2024 0331 by Delfina Jefferson RN  Outcome: Progressing     Problem: Discharge Planning  Goal: Discharge to home or other facility with appropriate resources  1/19/2024 0331 by Delfina Jefferson RN  Outcome: Progressing     Problem: Pain  Goal: Verbalizes/displays adequate comfort level or baseline comfort level  1/19/2024 0932 by Ricardo Mistry RN  Outcome: Progressing  1/19/2024 0331 by Delfina Jefferson RN  Outcome: Progressing     Problem: Safety - Adult  Goal: Free from fall injury  1/19/2024 0331 by Delfina Jefferson RN  Outcome: Progressing     Problem: Chronic Conditions and Co-morbidities  Goal: Patient's chronic conditions and co-morbidity symptoms are monitored and maintained or improved  1/19/2024 0331 by Delfina Jefferson, RN  Outcome: Progressing

## 2024-01-19 NOTE — PROGRESS NOTES
Notified attending NP of pt's abnormal cardiac rhythm of bigeminy. New orders received.   Trazodone ineffective per 7/23 OV note, rx denied.

## 2024-01-19 NOTE — PROGRESS NOTES
Patient blood sugar 364; given 16 units of Humalog. Attending NP notified. No new orders at this time.

## 2024-01-19 NOTE — CONSULTS
[facility]  [facility]    Consultation Note     Admit Date: 1/16/2024  1:17 PM    Reason for Consult:   Tocilizumab use and COVID-19 patient    Attending Physician:  Juve Mazariegos DO     Chief Complaint: SOB    HISTORY OF PRESENT ILLNESS:   The patient is a 68 y.o.  female known to the Infectious Diseases service. The patient was treated for Staph aureus bacteremia in November 2021 with IV Ancef.  Patient was admitted on 1/16 with shortness of breath and pain in the right side of the neck and shoulder.  She has a history of ESRD on hemodialysis. Patient started having cough and hypoxic at HD center so she came to ER. She is on 2L oxygen at home.   She has mostly dry cough some times gets mucus. She gets mostly after eating. She feels better since admission cough is improving. She is able to breath better. No fever or chills some time gets diaphoresis. No vomiting/diarrhea. She is back on 2L now. She has more of post nasal drip today. No chest pain.     Since admission patient is afebrile intermittently tachypneic blood pressure is normal currently on 2 L nasal cannula saturating 96% labs on admission showed white count of 8.4 today is 12 hemoglobin was 10.3 today 7.1 platelet count was 166 on admission today's 131 LFTs were normal BNP was 35,000 CRP was 12.  Creatinine of 3.9/BUN of 29 improved from 8.4/68 patient tested positive for COVID.  CT chest did not show any pulmonary abnormalities.  US Duplex of Bilateral Lower Extremities Did Not Show Any DVT.  Patient is on dexamethasone and I got consulted for tocilizumab use.    Past Medical History:        Diagnosis Date    Acute on chronic heart failure with preserved ejection fraction (HCC) 02/07/2023    Acute pulmonary embolism (HCC) 12/03/2022    Anemia due to stage 5 chronic kidney disease, not on chronic dialysis (HCC) 02/08/2023    Anemia in CKD (chronic kidney disease) 11/30/2021    Anxiety and depression 01/19/2022    Aortic  01/19/24  0041   WBC 8.6 8.0 12.0*   HGB 10.2* 10.2* 11.1*   HCT 31.9* 32.5* 35.3   .6* 104.5* 103.2*   * 125*  --    NEUTROABS 7.84* 6.82 10.62*     Lab Results   Component Value Date    CRP 12.0 (H) 01/16/2024    CRP 3.6 (H) 05/09/2023    CRP 8.8 (H) 02/01/2023     No results found for: \"CRPHS\"  Lab Results   Component Value Date    SEDRATE 56 (H) 01/16/2024    SEDRATE 80 (H) 05/09/2023    SEDRATE 65 (H) 02/01/2023     Lab Results   Component Value Date    ALT 12 01/17/2024    AST 15 01/17/2024    ALKPHOS 96 01/17/2024    BILITOT 0.5 01/17/2024     Lab Results   Component Value Date/Time     01/19/2024 12:41 AM    K 4.7 01/19/2024 12:41 AM    K 4.6 05/02/2023 11:05 AM    CL 91 01/19/2024 12:41 AM    CO2 29 01/19/2024 12:41 AM    BUN 29 01/19/2024 12:41 AM    CREATININE 3.9 01/19/2024 12:41 AM    GFRAA 8 09/22/2022 12:14 PM    LABGLOM 12 01/19/2024 12:41 AM    GLUCOSE 330 01/19/2024 12:41 AM    PROT 7.4 01/17/2024 04:43 AM    LABALBU 4.0 01/17/2024 04:43 AM    CALCIUM 9.3 01/19/2024 12:41 AM    BILITOT 0.5 01/17/2024 04:43 AM    ALKPHOS 96 01/17/2024 04:43 AM    AST 15 01/17/2024 04:43 AM    ALT 12 01/17/2024 04:43 AM       Lab Results   Component Value Date/Time    PROTIME 13.5 10/24/2023 07:50 AM    INR 1.2 10/24/2023 07:50 AM       Lab Results   Component Value Date/Time    TSH 1.53 10/09/2023 07:42 AM       Lab Results   Component Value Date/Time    COLORU Yellow 11/18/2021 03:19 AM    PHUR 6.0 11/18/2021 03:19 AM    WBCUA 1-3 11/18/2021 03:19 AM    RBCUA 0-1 11/18/2021 03:19 AM    BACTERIA FEW 11/18/2021 03:19 AM    CLARITYU Clear 11/18/2021 03:19 AM    SPECGRAV 1.020 11/18/2021 03:19 AM    LEUKOCYTESUR Negative 11/18/2021 03:19 AM    UROBILINOGEN 0.2 11/18/2021 03:19 AM    BILIRUBINUR Negative 11/18/2021 03:19 AM    BLOODU SMALL 11/18/2021 03:19 AM    GLUCOSEU 500 11/18/2021 03:19 AM       No results found for: \"KRF7XVD\", \"BEART\", \"P0JSEFSV\", \"PHART\", \"THGBART\", \"EVW0CGX\", \"PO2ART\",

## 2024-01-20 VITALS
OXYGEN SATURATION: 99 % | TEMPERATURE: 98.2 F | DIASTOLIC BLOOD PRESSURE: 88 MMHG | HEART RATE: 54 BPM | HEIGHT: 64 IN | WEIGHT: 252.21 LBS | RESPIRATION RATE: 16 BRPM | BODY MASS INDEX: 43.06 KG/M2 | SYSTOLIC BLOOD PRESSURE: 115 MMHG

## 2024-01-20 LAB
ANION GAP SERPL CALCULATED.3IONS-SCNC: 18 MMOL/L (ref 7–16)
BASOPHILS # BLD: 0.02 K/UL (ref 0–0.2)
BASOPHILS NFR BLD: 0 % (ref 0–2)
BUN SERPL-MCNC: 65 MG/DL (ref 6–23)
CALCIUM SERPL-MCNC: 9.4 MG/DL (ref 8.6–10.2)
CHLORIDE SERPL-SCNC: 90 MMOL/L (ref 98–107)
CO2 SERPL-SCNC: 26 MMOL/L (ref 22–29)
CREAT SERPL-MCNC: 6.3 MG/DL (ref 0.5–1)
EOSINOPHIL # BLD: 0 K/UL (ref 0.05–0.5)
EOSINOPHILS RELATIVE PERCENT: 0 % (ref 0–6)
ERYTHROCYTE [DISTWIDTH] IN BLOOD BY AUTOMATED COUNT: 14.8 % (ref 11.5–15)
GFR SERPL CREATININE-BSD FRML MDRD: 7 ML/MIN/1.73M2
GLUCOSE BLD-MCNC: 167 MG/DL (ref 74–99)
GLUCOSE BLD-MCNC: 315 MG/DL (ref 74–99)
GLUCOSE SERPL-MCNC: 295 MG/DL (ref 74–99)
HCT VFR BLD AUTO: 33.5 % (ref 34–48)
HGB BLD-MCNC: 10.5 G/DL (ref 11.5–15.5)
IMM GRANULOCYTES # BLD AUTO: 0.11 K/UL (ref 0–0.58)
IMM GRANULOCYTES NFR BLD: 1 % (ref 0–5)
LYMPHOCYTES NFR BLD: 0.83 K/UL (ref 1.5–4)
LYMPHOCYTES RELATIVE PERCENT: 9 % (ref 20–42)
MAGNESIUM SERPL-MCNC: 2.3 MG/DL (ref 1.6–2.6)
MCH RBC QN AUTO: 32.2 PG (ref 26–35)
MCHC RBC AUTO-ENTMCNC: 31.3 G/DL (ref 32–34.5)
MCV RBC AUTO: 102.8 FL (ref 80–99.9)
MONOCYTES NFR BLD: 0.43 K/UL (ref 0.1–0.95)
MONOCYTES NFR BLD: 5 % (ref 2–12)
NEUTROPHILS NFR BLD: 85 % (ref 43–80)
NEUTS SEG NFR BLD: 7.91 K/UL (ref 1.8–7.3)
PHOSPHATE SERPL-MCNC: 4.1 MG/DL (ref 2.5–4.5)
PLATELET # BLD AUTO: 128 K/UL (ref 130–450)
PMV BLD AUTO: 11.4 FL (ref 7–12)
POTASSIUM SERPL-SCNC: 5.1 MMOL/L (ref 3.5–5)
RBC # BLD AUTO: 3.26 M/UL (ref 3.5–5.5)
SODIUM SERPL-SCNC: 134 MMOL/L (ref 132–146)
WBC OTHER # BLD: 9.3 K/UL (ref 4.5–11.5)

## 2024-01-20 PROCEDURE — 82962 GLUCOSE BLOOD TEST: CPT

## 2024-01-20 PROCEDURE — 6360000002 HC RX W HCPCS: Performed by: STUDENT IN AN ORGANIZED HEALTH CARE EDUCATION/TRAINING PROGRAM

## 2024-01-20 PROCEDURE — 85025 COMPLETE CBC W/AUTO DIFF WBC: CPT

## 2024-01-20 PROCEDURE — 6370000000 HC RX 637 (ALT 250 FOR IP): Performed by: STUDENT IN AN ORGANIZED HEALTH CARE EDUCATION/TRAINING PROGRAM

## 2024-01-20 PROCEDURE — 90935 HEMODIALYSIS ONE EVALUATION: CPT

## 2024-01-20 PROCEDURE — 2700000000 HC OXYGEN THERAPY PER DAY

## 2024-01-20 PROCEDURE — 84100 ASSAY OF PHOSPHORUS: CPT

## 2024-01-20 PROCEDURE — 80048 BASIC METABOLIC PNL TOTAL CA: CPT

## 2024-01-20 PROCEDURE — 2500000003 HC RX 250 WO HCPCS: Performed by: STUDENT IN AN ORGANIZED HEALTH CARE EDUCATION/TRAINING PROGRAM

## 2024-01-20 PROCEDURE — 99239 HOSP IP/OBS DSCHRG MGMT >30: CPT | Performed by: STUDENT IN AN ORGANIZED HEALTH CARE EDUCATION/TRAINING PROGRAM

## 2024-01-20 PROCEDURE — 83735 ASSAY OF MAGNESIUM: CPT

## 2024-01-20 RX ORDER — IPRATROPIUM BROMIDE AND ALBUTEROL SULFATE 2.5; .5 MG/3ML; MG/3ML
1 SOLUTION RESPIRATORY (INHALATION) EVERY 4 HOURS PRN
Status: DISCONTINUED | OUTPATIENT
Start: 2024-01-20 | End: 2024-01-20 | Stop reason: HOSPADM

## 2024-01-20 RX ORDER — DEXAMETHASONE 6 MG/1
6 TABLET ORAL
Qty: 6 TABLET | Refills: 0 | Status: SHIPPED | OUTPATIENT
Start: 2024-01-20 | End: 2024-01-26

## 2024-01-20 RX ADMIN — INSULIN LISPRO 12 UNITS: 100 INJECTION, SOLUTION INTRAVENOUS; SUBCUTANEOUS at 06:20

## 2024-01-20 RX ADMIN — CALCIUM ACETATE 1334 MG: 667 CAPSULE ORAL at 12:56

## 2024-01-20 RX ADMIN — HEPARIN SODIUM 5000 UNITS: 10000 INJECTION INTRAVENOUS; SUBCUTANEOUS at 02:38

## 2024-01-20 ASSESSMENT — PAIN SCALES - GENERAL
PAINLEVEL_OUTOF10: 0
PAINLEVEL_OUTOF10: 0

## 2024-01-20 NOTE — PLAN OF CARE
Problem: ABCDS Injury Assessment  Goal: Absence of physical injury  1/20/2024 0917 by Ricardo Mistry RN  Outcome: Progressing  1/19/2024 2335 by Ellen Tamayo RN  Outcome: Progressing     Problem: Discharge Planning  Goal: Discharge to home or other facility with appropriate resources  1/19/2024 2335 by Ellen Tamayo RN  Outcome: Progressing     Problem: Pain  Goal: Verbalizes/displays adequate comfort level or baseline comfort level  1/19/2024 2335 by Ellen Tamayo RN  Outcome: Progressing     Problem: Safety - Adult  Goal: Free from fall injury  1/19/2024 2335 by Ellen Tamayo RN  Outcome: Progressing     Problem: Chronic Conditions and Co-morbidities  Goal: Patient's chronic conditions and co-morbidity symptoms are monitored and maintained or improved  1/19/2024 2335 by Ellen Tamayo RN  Outcome: Progressing

## 2024-01-20 NOTE — PROGRESS NOTES
[facility]  MD Sidney Amezcua MD Ali Hassan, MD Lisa Kniska, CNP   Jaja Echavarria, YOSHI Hope, CASH  Progress Note    1/19/2024    SUBJECTIVE:   1/18: Seen this early afternoon.  Awaiting CT scan.  Anxious but otherwise denies other complaint.  Swelling still present in lower extremities.  Dyspnea with even minimal exertion such as walking 6 feet.   (-) sob/johnson/cp/palp Appetite ok    1/19: Seen this early afternoon.  Yesterday after dialysis while chewing HLP subjective she experienced \"locked jaw\" and facial dysesthesias.  The sensations lasted 5 to 10 minutes, resolved spontaneously.  Otherwise no new complaint.  Still has severe fatigue, dyspnea with even minimal exertion.  Intermittent dry cough associated with COVID    PROBLEM LIST:    Principal Problem:    Fluid overload  Active Problems:    ESRD on hemodialysis (HCC)    COVID-19    Macrocytosis    Chronic anemia  Resolved Problems:    * No resolved hospital problems. *         DIET:    ADULT DIET; Regular; 4 carb choices (60 gm/meal); Low Potassium (Less than 3000 mg/day); Low Phosphorus (Less than 1000 mg)     MEDS (scheduled):    heparin (porcine)  5,000 Units SubCUTAneous Q8H    sodium chloride flush  5-40 mL IntraVENous 2 times per day    aspirin  81 mg Oral QAM    atorvastatin  40 mg Oral QAM    calcium acetate  2 capsule Oral TID WC    insulin glargine  10 Units SubCUTAneous QAM    prednisoLONE acetate  1 drop Both Eyes QAM    dexAMETHasone  6 mg IntraVENous Q24H    ipratropium 0.5 mg-albuterol 2.5 mg  1 Dose Inhalation Q4H WA RT    insulin lispro  0-16 Units SubCUTAneous TID WC    insulin lispro  0-4 Units SubCUTAneous Nightly       MEDS (infusions):   sodium chloride      dextrose         MEDS (prn):  LORazepam, sodium chloride flush, sodium chloride, ondansetron **OR** ondansetron, polyethylene glycol, acetaminophen **OR** acetaminophen, dextrose bolus **OR** dextrose bolus, glucagon (rDNA),

## 2024-01-20 NOTE — DISCHARGE SUMMARY
[facility] Hospitalist Physician Discharge Summary       No follow-up provider specified.    Activity level: As tolerated     Dispo: home       Condition on discharge: Stable     Patient ID:  Lizette Murray  [id number]  68 y.o.  1955    Admit date: 1/16/2024    Discharge date and time:  1/20/2024  2:10 PM    Admission Diagnoses: Principal Problem:    Fluid overload  Active Problems:    ESRD on hemodialysis (HCC)    COVID-19    Macrocytosis    Chronic anemia  Resolved Problems:    * No resolved hospital problems. *      Discharge Diagnoses: Principal Problem:    Fluid overload  Active Problems:    ESRD on hemodialysis (HCC)    COVID-19    Macrocytosis    Chronic anemia  Resolved Problems:    * No resolved hospital problems. *      Consults:  IP CONSULT TO NEPHROLOGY  IP CONSULT TO PHARMACY  IP CONSULT TO IV TEAM  IP CONSULT TO PHARMACY  IP CONSULT TO PHARMACY  IP CONSULT TO INFECTIOUS DISEASES    Procedures: na     Hospital Course:     68-year-old female present to ER for chief complaint of dyspnea on exertion.  She is a end-stage renal disease patient, hemodialysis patient was tested positive for COVID-19 infection, she wears 2 L oxygen nasal cannula at home, increased to 4 L nasal cannula in the ER patient received vitamin supplement and also dexamethasone scheduled nebulizer treatment, infectious disease consulted she is not a candidate for remdesivir.  This patient continue to improve with steroid treatment.  She is medically stable to discharge home new prescription sent to her pharmacy instructed to come back to the ER if symptoms worse.  Need to close follow-up PCP in 1 week  She is back to 2 L oxygen which is her baseline oxygen requirement at home    Discharge Exam:    General Appearance: alert and oriented to person, place and time and in no acute distress  Skin: warm and dry  Head: normocephalic and atraumatic  Eyes: pupils equal, round, and reactive to light, extraocular eye movements  imaging and Doppler spectral analysis and color flow was obtained of the deep venous structures of the bilateral extremities. COMPARISON: None. HISTORY: ORDERING SYSTEM PROVIDED HISTORY: Pain left calf FINDINGS: The visualized veins of the bilateral lower extremities are patent and free of echogenic thrombus. The veins demonstrate good compressibility with normal color flow study and spectral analysis. Please note study is limited by patient's body habitus and extensive soft tissue edema.     No evidence of DVT in either lower extremity.       Patient Instructions:      Medication List        START taking these medications      dexAMETHasone 6 MG tablet  Commonly known as: Decadron  Take 1 tablet by mouth daily (with breakfast) for 6 days            CONTINUE taking these medications      acetaminophen 650 MG extended release tablet  Commonly known as: TYLENOL     aspirin 81 MG chewable tablet     atorvastatin 40 MG tablet  Commonly known as: Lipitor  Take 1 tablet by mouth every morning     Basaglar KwikPen 100 UNIT/ML injection pen  Generic drug: insulin glargine     calcium acetate 667 MG Caps capsule  Commonly known as: PHOSLO     midodrine 10 MG tablet  Commonly known as: PROAMATINE     NovoLOG FlexPen 100 UNIT/ML injection pen  Generic drug: insulin aspart     OXYGEN     prednisoLONE acetate 1 % ophthalmic suspension  Commonly known as: PRED FORTE               Where to Get Your Medications        These medications were sent to [id number] - [place], [place] - [address] - P [phone] - F [phone] [address] [facility] [address]      Phone: [phone]   dexAMETHasone 6 MG tablet           Note that more than 30 minutes was spent in preparing discharge papers, discussing discharge with patient, medication review, etc.    Signed:  Electronically signed by Juve Mazariegos DO on 1/20/2024 at 2:10 PM

## 2024-01-20 NOTE — FLOWSHEET NOTE
01/20/24 1250   Vital Signs   /88   Pulse 54   Respirations 16   Weight - Scale 114.4 kg (252 lb 3.3 oz)   Weight Method Stated   Percent Weight Change -1.89   Pain Assessment   Pain Assessment None - Denies Pain   Pain Level 0   Post-Hemodialysis Assessment   Post-Treatment Procedures Blood returned;Access bleeding time < 10 minutes   Machine Disinfection Process Exterior Machine Disinfection   Blood Volume Processed (Liters) 300 L   Dialyzer Clearance Moderately streaked   Duration of Treatment (minutes) 240 minutes   Heparin Amount Administered During Treatment (mL) 0 mL   Hemodialysis Intake (ml) 300 ml   Hemodialysis Output (ml) 2500 ml   NET Removed (ml) 2200   Tolerated Treatment Good   Patient Response to Treatment pt tolerated tx well 2200mL removed bruit/thrill noted nurse to nurse report given to floor JUDY Sharma pt/vitals stable upon d/c dialysis unit   Patient Disposition Return to room

## 2024-01-20 NOTE — PROGRESS NOTES
Spoke with Dr. Jones regarding dc, ok to dc - resume TTS HD.   Spoke with Dr. Mazariegos regarding above if he wanted to dc, will check with patient regarding dc and on baseline o2 demands. Pt ready to go, Dr. Mazariegos to place dc orders.

## 2024-01-20 NOTE — PROGRESS NOTES
[facility]  MD Sidney Amezcua MD Ali Hassan, MD Lisa Kniska, CNP   Jaja Echavarria, YOSHI Hope, CASH  Progress Note    1/20/2024    SUBJECTIVE:   1/18: Seen this early afternoon.  Awaiting CT scan.  Anxious but otherwise denies other complaint.  Swelling still present in lower extremities.  Dyspnea with even minimal exertion such as walking 6 feet.   (-) sob/johnson/cp/palp Appetite ok    1/19: Seen this early afternoon.  Yesterday after dialysis while chewing HLP subjective she experienced \"locked jaw\" and facial dysesthesias.  The sensations lasted 5 to 10 minutes, resolved spontaneously.  Otherwise no new complaint.  Still has severe fatigue, dyspnea with even minimal exertion.  Intermittent dry cough associated with COVID    1/20 seen on HD tolerating treatment . Able to get 3 L on UF beyond that BP become an issues     PROBLEM LIST:    Principal Problem:    Fluid overload  Active Problems:    ESRD on hemodialysis (HCC)    COVID-19    Macrocytosis    Chronic anemia  Resolved Problems:    * No resolved hospital problems. *         DIET:    ADULT DIET; Regular; 4 carb choices (60 gm/meal); Low Potassium (Less than 3000 mg/day); Low Phosphorus (Less than 1000 mg)     MEDS (scheduled):    heparin (porcine)  5,000 Units SubCUTAneous Q8H    sodium chloride flush  5-40 mL IntraVENous 2 times per day    aspirin  81 mg Oral QAM    atorvastatin  40 mg Oral QAM    calcium acetate  2 capsule Oral TID WC    insulin glargine  10 Units SubCUTAneous QAM    prednisoLONE acetate  1 drop Both Eyes QAM    dexAMETHasone  6 mg IntraVENous Q24H    insulin lispro  0-16 Units SubCUTAneous TID WC    insulin lispro  0-4 Units SubCUTAneous Nightly       MEDS (infusions):   sodium chloride      dextrose         MEDS (prn):  ipratropium 0.5 mg-albuterol 2.5 mg, LORazepam, sodium chloride flush, sodium chloride, ondansetron **OR** ondansetron, polyethylene glycol, acetaminophen **OR**  2.3   PHOS  --   --  4.1   BUN 33* 29* 65*   CREATININE 4.6* 3.9* 6.3*         No results found for: \"LABPROT\"    ASSESSMENT  68-year-old woman known to me he undergoes chronic intermittent hemodialysis at the [facility] dialysis center on Tuesdays Thursdays Saturdays.  Admitted with dyspnea, cough.  Diagnosed with COVID.  Usually is edematous getting difficulties with fluid and salt restrictions as an outpatient, as well as intradialytic hypotension.      Potassium 5.9 presentation, 5.6 this morning despite 3-hour dialysis treatment yesterday  1.  ESRD  2.  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  Hyperkalemia  5.  Edema, chronic  6.  Palpitations and perioral dysesthesias after albuterol.  Likely side effect of the medication.  Has history of PVCs.  Regular rhythm currently with frequent PVCs    RECOMMENDATIONS  Continue IHD support for solute and volume clearance as per usual Tuesday Thursday Saturday schedule.  Continue other aspects of renal management  Follow labs BP      Electronically signed by Sedrick Jones MD on 1/20/2024

## 2024-01-20 NOTE — PROGRESS NOTES
JENNIFER HANEY notified of discharge. VM left    Electronically signed by Mary Herrera RN on 1/20/2024 at 3:07 PM

## 2024-01-20 NOTE — PLAN OF CARE
Problem: Pain  Goal: Verbalizes/displays adequate comfort level or baseline comfort level  Outcome: Progressing     Problem: Discharge Planning  Goal: Discharge to home or other facility with appropriate resources  Outcome: Progressing     Problem: ABCDS Injury Assessment  Goal: Absence of physical injury  Outcome: Progressing

## 2024-01-20 NOTE — RT PROTOCOL NOTE
RT Nebulizer Bronchodilator Protocol Note    There is a bronchodilator order in the chart from a provider indicating to follow the RT Bronchodilator Protocol and there is an “Initiate RT Bronchodilator Protocol” order as well (see protocol at bottom of note).    CXR Findings:  No results found.cat scan  no abnormal findings    The findings from the last RT Protocol Assessment were as follows:  Smoking: None or smoker <15 pack years  Respiratory Pattern: Regular pattern and RR 12-20 bpm  Breath Sounds: Clear breath sounds  Cough: Strong, spontaneous, non-productive  Indication for Bronchodilator Therapy:    Bronchodilator Assessment Score: 0    Aerosolized bronchodilator medication orders have been revised according to the RT Nebulizer Bronchodilator Protocol below.    Respiratory Therapist to perform RT Therapy Protocol Assessment initially then follow the protocol.  Repeat RT Therapy Protocol Assessment PRN for score 0-3 or on second treatment, BID, and PRN for scores above 3.    No Indications - adjust the frequency to every 6 hours PRN wheezing or bronchospasm, if no treatments needed after 48 hours then discontinue using Per Protocol order mode.     If indication present, adjust the RT bronchodilator orders based on the Bronchodilator Assessment Score as indicated below.  If a patient is on this medication at home then do not decrease Frequency below that used at home.    0-3 - enter or revise RT bronchodilator order(s) to equivalent RT Bronchodilator order with Frequency of every 4 hours PRN for wheezing or increased work of breathing using Per Protocol order mode.       4-6 - enter or revise RT Bronchodilator order(s) to two equivalent RT bronchodilator orders with one order with BID Frequency and one order with Frequency of every 4 hours PRN wheezing or increased work of breathing using Per Protocol order mode.         7-10 - enter or revise RT Bronchodilator order(s) to two equivalent RT bronchodilator  orders with one order with TID Frequency and one order with Frequency of every 4 hours PRN wheezing or increased work of breathing using Per Protocol order mode.       11-13 - enter or revise RT Bronchodilator order(s) to one equivalent RT bronchodilator order with QID Frequency and an Albuterol order with Frequency of every 4 hours PRN wheezing or increased work of breathing using Per Protocol order mode.      Greater than 13 - enter or revise RT Bronchodilator order(s) to one equivalent RT bronchodilator order with every 4 hours Frequency and an Albuterol order with Frequency of every 2 hours PRN wheezing or increased work of breathing using Per Protocol order mode.   .    Electronically signed by Zahra Jacob RCP on 1/20/2024 at 12:13 PM

## 2024-02-17 ENCOUNTER — APPOINTMENT (OUTPATIENT)
Dept: GENERAL RADIOLOGY | Age: 69
DRG: 291 | End: 2024-02-17
Payer: MEDICARE

## 2024-02-17 ENCOUNTER — HOSPITAL ENCOUNTER (INPATIENT)
Age: 69
LOS: 1 days | Discharge: HOME | DRG: 291 | End: 2024-02-18
Attending: EMERGENCY MEDICINE | Admitting: STUDENT IN AN ORGANIZED HEALTH CARE EDUCATION/TRAINING PROGRAM
Payer: MEDICARE

## 2024-02-17 ENCOUNTER — APPOINTMENT (OUTPATIENT)
Dept: CT IMAGING | Age: 69
DRG: 291 | End: 2024-02-17
Payer: MEDICARE

## 2024-02-17 DIAGNOSIS — E87.5 HYPERKALEMIA: ICD-10-CM

## 2024-02-17 DIAGNOSIS — N18.6 ESRD (END STAGE RENAL DISEASE) ON DIALYSIS (HCC): ICD-10-CM

## 2024-02-17 DIAGNOSIS — R05.1 ACUTE COUGH: Primary | ICD-10-CM

## 2024-02-17 DIAGNOSIS — Z99.2 ESRD (END STAGE RENAL DISEASE) ON DIALYSIS (HCC): ICD-10-CM

## 2024-02-17 LAB
ALBUMIN SERPL-MCNC: 4.1 G/DL (ref 3.5–5.2)
ALP SERPL-CCNC: 106 U/L (ref 35–104)
ALT SERPL-CCNC: 13 U/L (ref 0–32)
ANION GAP SERPL CALCULATED.3IONS-SCNC: 19 MMOL/L (ref 7–16)
AST SERPL-CCNC: 16 U/L (ref 0–31)
BASOPHILS # BLD: 0 K/UL (ref 0–0.2)
BASOPHILS NFR BLD: 0 % (ref 0–2)
BILIRUB SERPL-MCNC: 0.3 MG/DL (ref 0–1.2)
BNP SERPL-MCNC: ABNORMAL PG/ML (ref 0–125)
BUN SERPL-MCNC: 54 MG/DL (ref 6–23)
CALCIUM SERPL-MCNC: 9.4 MG/DL (ref 8.6–10.2)
CHLORIDE SERPL-SCNC: 91 MMOL/L (ref 98–107)
CO2 SERPL-SCNC: 26 MMOL/L (ref 22–29)
CREAT SERPL-MCNC: 7.1 MG/DL (ref 0.5–1)
EOSINOPHIL # BLD: 0.35 K/UL (ref 0.05–0.5)
EOSINOPHILS RELATIVE PERCENT: 4 % (ref 0–6)
ERYTHROCYTE [DISTWIDTH] IN BLOOD BY AUTOMATED COUNT: 14.4 % (ref 11.5–15)
GFR SERPL CREATININE-BSD FRML MDRD: 6 ML/MIN/1.73M2
GLUCOSE BLD-MCNC: 137 MG/DL (ref 74–99)
GLUCOSE SERPL-MCNC: 224 MG/DL (ref 74–99)
HCT VFR BLD AUTO: 32.6 % (ref 34–48)
HGB BLD-MCNC: 10.5 G/DL (ref 11.5–15.5)
INFLUENZA A BY PCR: NOT DETECTED
INFLUENZA B BY PCR: NOT DETECTED
LYMPHOCYTES NFR BLD: 0.42 K/UL (ref 1.5–4)
LYMPHOCYTES RELATIVE PERCENT: 5 % (ref 20–42)
MCH RBC QN AUTO: 32.8 PG (ref 26–35)
MCHC RBC AUTO-ENTMCNC: 32.2 G/DL (ref 32–34.5)
MCV RBC AUTO: 101.9 FL (ref 80–99.9)
MONOCYTES NFR BLD: 0.42 K/UL (ref 0.1–0.95)
MONOCYTES NFR BLD: 5 % (ref 2–12)
NEUTROPHILS NFR BLD: 85 % (ref 43–80)
NEUTS SEG NFR BLD: 6.81 K/UL (ref 1.8–7.3)
NUCLEATED RED BLOOD CELLS: 1 PER 100 WBC
PLATELET # BLD AUTO: 141 K/UL (ref 130–450)
PMV BLD AUTO: 10.5 FL (ref 7–12)
POTASSIUM SERPL-SCNC: 5.4 MMOL/L (ref 3.5–5)
PROT SERPL-MCNC: 7.3 G/DL (ref 6.4–8.3)
RBC # BLD AUTO: 3.2 M/UL (ref 3.5–5.5)
RBC # BLD: ABNORMAL 10*6/UL
RBC # BLD: ABNORMAL 10*6/UL
SARS-COV-2 RDRP RESP QL NAA+PROBE: NOT DETECTED
SODIUM SERPL-SCNC: 136 MMOL/L (ref 132–146)
SPECIMEN DESCRIPTION: NORMAL
TROPONIN I SERPL HS-MCNC: 63 NG/L (ref 0–9)
TROPONIN I SERPL HS-MCNC: 66 NG/L (ref 0–9)
WBC OTHER # BLD: 8 K/UL (ref 4.5–11.5)

## 2024-02-17 PROCEDURE — 6360000004 HC RX CONTRAST MEDICATION: Performed by: RADIOLOGY

## 2024-02-17 PROCEDURE — 84484 ASSAY OF TROPONIN QUANT: CPT

## 2024-02-17 PROCEDURE — 83880 ASSAY OF NATRIURETIC PEPTIDE: CPT

## 2024-02-17 PROCEDURE — 87635 SARS-COV-2 COVID-19 AMP PRB: CPT

## 2024-02-17 PROCEDURE — 90935 HEMODIALYSIS ONE EVALUATION: CPT

## 2024-02-17 PROCEDURE — 94640 AIRWAY INHALATION TREATMENT: CPT

## 2024-02-17 PROCEDURE — 5A1D70Z PERFORMANCE OF URINARY FILTRATION, INTERMITTENT, LESS THAN 6 HOURS PER DAY: ICD-10-PCS | Performed by: INTERNAL MEDICINE

## 2024-02-17 PROCEDURE — 85025 COMPLETE CBC W/AUTO DIFF WBC: CPT

## 2024-02-17 PROCEDURE — 99223 1ST HOSP IP/OBS HIGH 75: CPT | Performed by: STUDENT IN AN ORGANIZED HEALTH CARE EDUCATION/TRAINING PROGRAM

## 2024-02-17 PROCEDURE — 94664 DEMO&/EVAL PT USE INHALER: CPT

## 2024-02-17 PROCEDURE — 99285 EMERGENCY DEPT VISIT HI MDM: CPT

## 2024-02-17 PROCEDURE — 80053 COMPREHEN METABOLIC PANEL: CPT

## 2024-02-17 PROCEDURE — 96374 THER/PROPH/DIAG INJ IV PUSH: CPT

## 2024-02-17 PROCEDURE — 93005 ELECTROCARDIOGRAM TRACING: CPT

## 2024-02-17 PROCEDURE — 71275 CT ANGIOGRAPHY CHEST: CPT

## 2024-02-17 PROCEDURE — 82962 GLUCOSE BLOOD TEST: CPT

## 2024-02-17 PROCEDURE — 87502 INFLUENZA DNA AMP PROBE: CPT

## 2024-02-17 PROCEDURE — 6370000000 HC RX 637 (ALT 250 FOR IP): Performed by: EMERGENCY MEDICINE

## 2024-02-17 PROCEDURE — 6360000002 HC RX W HCPCS

## 2024-02-17 PROCEDURE — 1200000000 HC SEMI PRIVATE

## 2024-02-17 PROCEDURE — 6370000000 HC RX 637 (ALT 250 FOR IP): Performed by: NURSE PRACTITIONER

## 2024-02-17 PROCEDURE — 2580000003 HC RX 258: Performed by: NURSE PRACTITIONER

## 2024-02-17 RX ORDER — POLYETHYLENE GLYCOL 3350 17 G/17G
17 POWDER, FOR SOLUTION ORAL DAILY PRN
Status: DISCONTINUED | OUTPATIENT
Start: 2024-02-17 | End: 2024-02-18 | Stop reason: HOSPADM

## 2024-02-17 RX ORDER — CALCIUM ACETATE 667 MG/1
2 CAPSULE ORAL
Status: DISCONTINUED | OUTPATIENT
Start: 2024-02-18 | End: 2024-02-18 | Stop reason: HOSPADM

## 2024-02-17 RX ORDER — INSULIN GLARGINE 100 [IU]/ML
10 INJECTION, SOLUTION SUBCUTANEOUS EVERY MORNING
Status: DISCONTINUED | OUTPATIENT
Start: 2024-02-18 | End: 2024-02-18 | Stop reason: HOSPADM

## 2024-02-17 RX ORDER — SODIUM CHLORIDE 9 MG/ML
INJECTION, SOLUTION INTRAVENOUS PRN
Status: DISCONTINUED | OUTPATIENT
Start: 2024-02-17 | End: 2024-02-18 | Stop reason: HOSPADM

## 2024-02-17 RX ORDER — HEPARIN SODIUM 10000 [USP'U]/ML
5000 INJECTION, SOLUTION INTRAVENOUS; SUBCUTANEOUS EVERY 8 HOURS SCHEDULED
Status: DISCONTINUED | OUTPATIENT
Start: 2024-02-17 | End: 2024-02-18 | Stop reason: HOSPADM

## 2024-02-17 RX ORDER — ACETAMINOPHEN 325 MG/1
650 TABLET ORAL
Status: DISPENSED | OUTPATIENT
Start: 2024-02-17 | End: 2024-02-18

## 2024-02-17 RX ORDER — SODIUM CHLORIDE 0.9 % (FLUSH) 0.9 %
5-40 SYRINGE (ML) INJECTION EVERY 12 HOURS SCHEDULED
Status: DISCONTINUED | OUTPATIENT
Start: 2024-02-17 | End: 2024-02-18 | Stop reason: HOSPADM

## 2024-02-17 RX ORDER — IPRATROPIUM BROMIDE AND ALBUTEROL SULFATE 2.5; .5 MG/3ML; MG/3ML
1 SOLUTION RESPIRATORY (INHALATION) ONCE
Status: COMPLETED | OUTPATIENT
Start: 2024-02-17 | End: 2024-02-17

## 2024-02-17 RX ORDER — ASPIRIN 81 MG/1
81 TABLET, CHEWABLE ORAL EVERY MORNING
Status: DISCONTINUED | OUTPATIENT
Start: 2024-02-18 | End: 2024-02-18 | Stop reason: HOSPADM

## 2024-02-17 RX ORDER — ACETAMINOPHEN 650 MG/1
650 SUPPOSITORY RECTAL EVERY 6 HOURS PRN
Status: DISCONTINUED | OUTPATIENT
Start: 2024-02-17 | End: 2024-02-18 | Stop reason: HOSPADM

## 2024-02-17 RX ORDER — SODIUM CHLORIDE 0.9 % (FLUSH) 0.9 %
5-40 SYRINGE (ML) INJECTION PRN
Status: DISCONTINUED | OUTPATIENT
Start: 2024-02-17 | End: 2024-02-18 | Stop reason: HOSPADM

## 2024-02-17 RX ORDER — PREDNISOLONE ACETATE 10 MG/ML
1 SUSPENSION/ DROPS OPHTHALMIC EVERY MORNING
Status: DISCONTINUED | OUTPATIENT
Start: 2024-02-18 | End: 2024-02-18 | Stop reason: HOSPADM

## 2024-02-17 RX ORDER — ATORVASTATIN CALCIUM 40 MG/1
40 TABLET, FILM COATED ORAL EVERY MORNING
Status: DISCONTINUED | OUTPATIENT
Start: 2024-02-18 | End: 2024-02-18 | Stop reason: HOSPADM

## 2024-02-17 RX ORDER — ONDANSETRON 2 MG/ML
INJECTION INTRAMUSCULAR; INTRAVENOUS
Status: COMPLETED
Start: 2024-02-17 | End: 2024-02-17

## 2024-02-17 RX ORDER — INSULIN LISPRO 100 [IU]/ML
15 INJECTION, SOLUTION INTRAVENOUS; SUBCUTANEOUS
Status: DISCONTINUED | OUTPATIENT
Start: 2024-02-17 | End: 2024-02-18

## 2024-02-17 RX ORDER — ONDANSETRON 2 MG/ML
4 INJECTION INTRAMUSCULAR; INTRAVENOUS EVERY 6 HOURS PRN
Status: DISCONTINUED | OUTPATIENT
Start: 2024-02-17 | End: 2024-02-18 | Stop reason: HOSPADM

## 2024-02-17 RX ORDER — ACETAMINOPHEN 325 MG/1
650 TABLET ORAL EVERY 6 HOURS PRN
Status: DISCONTINUED | OUTPATIENT
Start: 2024-02-17 | End: 2024-02-18 | Stop reason: HOSPADM

## 2024-02-17 RX ADMIN — ONDANSETRON 4 MG: 2 INJECTION INTRAMUSCULAR; INTRAVENOUS at 21:59

## 2024-02-17 RX ADMIN — IPRATROPIUM BROMIDE AND ALBUTEROL SULFATE 1 DOSE: 2.5; .5 SOLUTION RESPIRATORY (INHALATION) at 13:59

## 2024-02-17 RX ADMIN — IOPAMIDOL 75 ML: 755 INJECTION, SOLUTION INTRAVENOUS at 15:11

## 2024-02-17 RX ADMIN — SODIUM CHLORIDE, PRESERVATIVE FREE 10 ML: 5 INJECTION INTRAVENOUS at 21:59

## 2024-02-17 RX ADMIN — ACETAMINOPHEN 650 MG: 325 TABLET ORAL at 21:00

## 2024-02-17 ASSESSMENT — PAIN SCALES - GENERAL: PAINLEVEL_OUTOF10: 0

## 2024-02-17 NOTE — H&P
[facility]  Hospitalist Group   History and Physical      CHIEF COMPLAINT:  Cough    History of Present Illness:  68 y.o. female with a history of CHF, PE, anemia, ESRD on HD, depression/anxiety, DM, HTN, HLD presents with SOB and cough.  Patient states about 3 PM yesterday she developed significant cough.  Has been ongoing since.  Does chronically wear 3L NC at home.  States she did not go to HD this morning 2/2 cough.  Also notes some SOB.  Denies any fevers, chills, N/V/D, CP.  Decision to admit patient for further evaluation and treatment.    Informant(s) for H&P:Pt and EMR    REVIEW OF SYSTEMS:  Admits to cough, SOB.  Denies fevers, chills, cp, n/v, ha, vision/hearing changes, wt changes, hot/cold flashes, other open skin lesions, diarrhea, constipation, dysuria/hematuria unless noted in HPI. Complete ROS performed with the patient and is otherwise negative.      PMH:  Past Medical History:   Diagnosis Date    Acute on chronic heart failure with preserved ejection fraction (HCC) 02/07/2023    Acute pulmonary embolism ([facility]) 12/03/2022    Anemia due to stage 5 chronic kidney disease, not on chronic dialysis ([facility]) 02/08/2023    Anemia in CKD (chronic kidney disease) 11/30/2021    Anxiety and depression 01/19/2022    Aortic stenosis, mild 02/08/2023 12/2022    At high risk for falls 01/19/2022    Brain aneurysm     CLABSI (central line-associated bloodstream infection) 11/19/2021    Cough 01/19/2022    Diabetes mellitus ([facility]) 2006    Diabetes mellitus type 2 with complications ([facility]) 11/30/2021    Dizziness on standing 01/19/2022    End-stage renal disease on hemodialysis ([facility]) 01/19/2023    ESRD (end stage renal disease) ([facility]) 11/19/2021    ESRD on hemodialysis ([facility]) 11/19/2021    Essential hypertension 11/30/2021    Fever, unspecified     Gastrointestinal hemorrhage 11/30/2021    H/O heart artery stent 2015    Done in [place]    History of Clostridioides difficile colitis 01/19/2022

## 2024-02-17 NOTE — ED NOTES
Radiology Procedure Waiver   Name: Lizette Murray  : 1955  MRN: [id number]    Date:  24    Time: 1:24 PM EST    Benefits of immediately proceeding with Radiology exam(s) without pre-testing outweigh the risks or are not indicated as specified below and therefore the following is/are being waived:    [] Pregnancy test   [] Patients LMP on-time and regular.   [] Patient had Tubal Ligation or has other Contraception Device.   [] Patient  is Menopausal or Premenarcheal.    [] Patient had Full or Partial Hysterectomy.    [] Protocol for Iodine allergy    [] MRI Questionnaire     [x] BUN/Creatinine   [] Patient age w/no hx of renal dysfunction.   [x] Patient on Dialysis.   [] Recent Normal Labs.  Electronically signed by Marysol Durand MD on 24 at 1:24 PM EST          Due for dialysis today     Marysol Durand MD  24 [id number]

## 2024-02-17 NOTE — ED PROVIDER NOTES
follow-up with family physician.    Records Reviewed: EKG from 1/16/2024 reviewed in comparison to today's.    I am the Primary Clinician of Record.    CONSULTS: (Who and What was discussed)  None    FINAL IMPRESSION      1. Acute cough    2. Hyperkalemia    3. ESRD (end stage renal disease) on dialysis (HCC)          DISPOSITION/PLAN     DISPOSITION Decision To Admit 02/17/2024 04:11:57 PM    PATIENT REFERRED TO:  No follow-up provider specified.    DISCHARGE MEDICATIONS:  New Prescriptions    No medications on file            (Please note that portions of this note were completed with a voice recognition program.  Efforts were made to edit the dictations but occasionally words are mis-transcribed.)    Jeison Fowler DO (electronically signed)

## 2024-02-18 ENCOUNTER — APPOINTMENT (OUTPATIENT)
Dept: GENERAL RADIOLOGY | Age: 69
DRG: 291 | End: 2024-02-18
Payer: MEDICARE

## 2024-02-18 VITALS
TEMPERATURE: 98.2 F | RESPIRATION RATE: 18 BRPM | BODY MASS INDEX: 42.38 KG/M2 | DIASTOLIC BLOOD PRESSURE: 69 MMHG | OXYGEN SATURATION: 96 % | HEART RATE: 73 BPM | WEIGHT: 246.91 LBS | SYSTOLIC BLOOD PRESSURE: 113 MMHG

## 2024-02-18 LAB
ANION GAP SERPL CALCULATED.3IONS-SCNC: 16 MMOL/L (ref 7–16)
BASOPHILS # BLD: 0.04 K/UL (ref 0–0.2)
BASOPHILS NFR BLD: 1 % (ref 0–2)
BUN SERPL-MCNC: 32 MG/DL (ref 6–23)
CALCIUM SERPL-MCNC: 9 MG/DL (ref 8.6–10.2)
CHLORIDE SERPL-SCNC: 93 MMOL/L (ref 98–107)
CO2 SERPL-SCNC: 26 MMOL/L (ref 22–29)
CREAT SERPL-MCNC: 5.2 MG/DL (ref 0.5–1)
EOSINOPHIL # BLD: 0.07 K/UL (ref 0.05–0.5)
EOSINOPHILS RELATIVE PERCENT: 1 % (ref 0–6)
ERYTHROCYTE [DISTWIDTH] IN BLOOD BY AUTOMATED COUNT: 14.6 % (ref 11.5–15)
GFR SERPL CREATININE-BSD FRML MDRD: 8 ML/MIN/1.73M2
GLUCOSE BLD-MCNC: 188 MG/DL (ref 74–99)
GLUCOSE BLD-MCNC: 212 MG/DL (ref 74–99)
GLUCOSE SERPL-MCNC: 203 MG/DL (ref 74–99)
HCT VFR BLD AUTO: 33.2 % (ref 34–48)
HGB BLD-MCNC: 10.3 G/DL (ref 11.5–15.5)
IMM GRANULOCYTES # BLD AUTO: <0.03 K/UL (ref 0–0.58)
IMM GRANULOCYTES NFR BLD: 0 % (ref 0–5)
LYMPHOCYTES NFR BLD: 0.78 K/UL (ref 1.5–4)
LYMPHOCYTES RELATIVE PERCENT: 13 % (ref 20–42)
MCH RBC QN AUTO: 32.4 PG (ref 26–35)
MCHC RBC AUTO-ENTMCNC: 31 G/DL (ref 32–34.5)
MCV RBC AUTO: 104.4 FL (ref 80–99.9)
MONOCYTES NFR BLD: 0.72 K/UL (ref 0.1–0.95)
MONOCYTES NFR BLD: 12 % (ref 2–12)
NEUTROPHILS NFR BLD: 73 % (ref 43–80)
NEUTS SEG NFR BLD: 4.41 K/UL (ref 1.8–7.3)
PLATELET, FLUORESCENCE: 132 K/UL (ref 130–450)
PMV BLD AUTO: 10.7 FL (ref 7–12)
POTASSIUM SERPL-SCNC: 4.7 MMOL/L (ref 3.5–5)
RBC # BLD AUTO: 3.18 M/UL (ref 3.5–5.5)
SODIUM SERPL-SCNC: 135 MMOL/L (ref 132–146)
WBC OTHER # BLD: 6 K/UL (ref 4.5–11.5)

## 2024-02-18 PROCEDURE — 80048 BASIC METABOLIC PNL TOTAL CA: CPT

## 2024-02-18 PROCEDURE — 6360000002 HC RX W HCPCS: Performed by: NURSE PRACTITIONER

## 2024-02-18 PROCEDURE — 82962 GLUCOSE BLOOD TEST: CPT

## 2024-02-18 PROCEDURE — 2580000003 HC RX 258: Performed by: NURSE PRACTITIONER

## 2024-02-18 PROCEDURE — 99239 HOSP IP/OBS DSCHRG MGMT >30: CPT | Performed by: STUDENT IN AN ORGANIZED HEALTH CARE EDUCATION/TRAINING PROGRAM

## 2024-02-18 PROCEDURE — 85025 COMPLETE CBC W/AUTO DIFF WBC: CPT

## 2024-02-18 PROCEDURE — 6370000000 HC RX 637 (ALT 250 FOR IP): Performed by: NURSE PRACTITIONER

## 2024-02-18 PROCEDURE — 2500000003 HC RX 250 WO HCPCS: Performed by: NURSE PRACTITIONER

## 2024-02-18 PROCEDURE — 6370000000 HC RX 637 (ALT 250 FOR IP): Performed by: STUDENT IN AN ORGANIZED HEALTH CARE EDUCATION/TRAINING PROGRAM

## 2024-02-18 PROCEDURE — 71045 X-RAY EXAM CHEST 1 VIEW: CPT

## 2024-02-18 RX ORDER — AZITHROMYCIN 500 MG/1
500 TABLET, FILM COATED ORAL DAILY
Qty: 3 TABLET | Refills: 0 | Status: ON HOLD | OUTPATIENT
Start: 2024-02-19 | End: 2024-02-23 | Stop reason: HOSPADM

## 2024-02-18 RX ORDER — AZITHROMYCIN 250 MG/1
500 TABLET, FILM COATED ORAL DAILY
Status: DISCONTINUED | OUTPATIENT
Start: 2024-02-18 | End: 2024-02-18 | Stop reason: HOSPADM

## 2024-02-18 RX ORDER — GUAIFENESIN 400 MG/1
400 TABLET ORAL 3 TIMES DAILY
Qty: 15 TABLET | Refills: 0 | Status: SHIPPED | OUTPATIENT
Start: 2024-02-18 | End: 2024-02-23

## 2024-02-18 RX ORDER — GUAIFENESIN 400 MG/1
400 TABLET ORAL 3 TIMES DAILY
Status: DISCONTINUED | OUTPATIENT
Start: 2024-02-18 | End: 2024-02-18 | Stop reason: HOSPADM

## 2024-02-18 RX ORDER — GUAIFENESIN/DEXTROMETHORPHAN 100-10MG/5
5 SYRUP ORAL EVERY 4 HOURS PRN
Status: DISCONTINUED | OUTPATIENT
Start: 2024-02-18 | End: 2024-02-18 | Stop reason: HOSPADM

## 2024-02-18 RX ORDER — FLUTICASONE PROPIONATE 50 MCG
1 SPRAY, SUSPENSION (ML) NASAL DAILY
Status: DISCONTINUED | OUTPATIENT
Start: 2024-02-18 | End: 2024-02-18 | Stop reason: HOSPADM

## 2024-02-18 RX ORDER — INSULIN LISPRO 100 [IU]/ML
0-4 INJECTION, SOLUTION INTRAVENOUS; SUBCUTANEOUS NIGHTLY
Status: DISCONTINUED | OUTPATIENT
Start: 2024-02-18 | End: 2024-02-18 | Stop reason: HOSPADM

## 2024-02-18 RX ORDER — HYDROXYZINE HYDROCHLORIDE 10 MG/1
25 TABLET, FILM COATED ORAL 3 TIMES DAILY PRN
Status: DISCONTINUED | OUTPATIENT
Start: 2024-02-18 | End: 2024-02-18 | Stop reason: HOSPADM

## 2024-02-18 RX ORDER — INSULIN LISPRO 100 [IU]/ML
0-4 INJECTION, SOLUTION INTRAVENOUS; SUBCUTANEOUS
Status: DISCONTINUED | OUTPATIENT
Start: 2024-02-18 | End: 2024-02-18 | Stop reason: HOSPADM

## 2024-02-18 RX ORDER — HYDROXYZINE PAMOATE 25 MG/1
25 CAPSULE ORAL ONCE
Status: COMPLETED | OUTPATIENT
Start: 2024-02-18 | End: 2024-02-18

## 2024-02-18 RX ADMIN — INSULIN LISPRO 1 UNITS: 100 INJECTION, SOLUTION INTRAVENOUS; SUBCUTANEOUS at 09:08

## 2024-02-18 RX ADMIN — HYDROXYZINE HYDROCHLORIDE 25 MG: 10 TABLET ORAL at 13:42

## 2024-02-18 RX ADMIN — CALCIUM ACETATE 1334 MG: 667 CAPSULE ORAL at 12:30

## 2024-02-18 RX ADMIN — HEPARIN SODIUM 5000 UNITS: 10000 INJECTION INTRAVENOUS; SUBCUTANEOUS at 00:06

## 2024-02-18 RX ADMIN — ASPIRIN 81 MG CHEWABLE TABLET 81 MG: 81 TABLET CHEWABLE at 09:09

## 2024-02-18 RX ADMIN — ACETAMINOPHEN 650 MG: 325 TABLET ORAL at 08:04

## 2024-02-18 RX ADMIN — HEPARIN SODIUM 5000 UNITS: 10000 INJECTION INTRAVENOUS; SUBCUTANEOUS at 08:03

## 2024-02-18 RX ADMIN — ATORVASTATIN CALCIUM 40 MG: 40 TABLET, FILM COATED ORAL at 09:09

## 2024-02-18 RX ADMIN — INSULIN GLARGINE 10 UNITS: 100 INJECTION, SOLUTION SUBCUTANEOUS at 09:23

## 2024-02-18 RX ADMIN — HYDROXYZINE PAMOATE 25 MG: 25 CAPSULE ORAL at 00:29

## 2024-02-18 RX ADMIN — SODIUM CHLORIDE, PRESERVATIVE FREE 10 ML: 5 INJECTION INTRAVENOUS at 09:11

## 2024-02-18 RX ADMIN — AZITHROMYCIN 500 MG: 250 TABLET, FILM COATED ORAL at 10:33

## 2024-02-18 RX ADMIN — GUAIFENESIN 400 MG: 400 TABLET ORAL at 10:33

## 2024-02-18 ASSESSMENT — PAIN DESCRIPTION - LOCATION: LOCATION: BACK

## 2024-02-18 ASSESSMENT — PAIN SCALES - GENERAL: PAINLEVEL_OUTOF10: 8

## 2024-02-18 NOTE — FLOWSHEET NOTE
02/17/24 1913   Vital Signs   /64   Temp 97.5 °F (36.4 °C)   Pulse 71   Respirations 20   SpO2 93 %   Weight - Scale 112 kg (246 lb 14.6 oz)   Weight Method Actual   Percent Weight Change -1.23   Post-Hemodialysis Assessment   Post-Treatment Procedures Blood returned;Access bleeding time < 10 minutes   Machine Disinfection Process Acid/Vinegar Clean;Machine Absence of Bleach Machine;Exterior Machine Disinfection;Bleach   Rinseback Volume (ml) 300 ml   Blood Volume Processed (Liters) 54.3 L   Dialyzer Clearance Lightly streaked   Duration of Treatment (minutes) 150 minutes   Hemodialysis Intake (ml) 300 ml   Hemodialysis Output (ml) 1600 ml   NET Removed (ml) 1300   Tolerated Treatment Fair   Patient Response to Treatment Tolerated well, continued to cough / has some shivers no temp .   Bilateral Breath Sounds Diminished   Edema Generalized   Edema Generalized +1   Physician Notified No   Time Off 0806   Patient Disposition Remain in ICU/ED   Observations & Evaluations   Level of Consciousness 0   Oriented X 3   Heart Rhythm Regular   Respiratory Quality/Effort Dyspnea with exertion;Unlabored   O2 Device Nasal cannula  (4l)   Skin Color Pale   Skin Condition/Temp Warm   Appetite Good   Abdomen Inspection Soft   Bowel Sounds (All Quadrants) Active   Comments $L nc 1200cc net fluid removal.

## 2024-02-18 NOTE — DISCHARGE SUMMARY
tablet  Take 1 tablet by mouth in the morning, at noon, and at bedtime for 5 days            CONTINUE taking these medications      acetaminophen 650 MG extended release tablet  Commonly known as: TYLENOL     aspirin 81 MG chewable tablet     atorvastatin 40 MG tablet  Commonly known as: Lipitor  Take 1 tablet by mouth every morning     Basaglar KwikPen 100 UNIT/ML injection pen  Generic drug: insulin glargine     calcium acetate 667 MG Caps capsule  Commonly known as: PHOSLO     midodrine 10 MG tablet  Commonly known as: PROAMATINE     NovoLOG FlexPen 100 UNIT/ML injection pen  Generic drug: insulin aspart     OXYGEN     prednisoLONE acetate 1 % ophthalmic suspension  Commonly known as: PRED FORTE               Where to Get Your Medications        These medications were sent to [id number] - [place], [place] - [address] - P [phone] - F [phone] [address] [facility] [address]      Phone: [phone]   azithromycin 500 MG tablet  guaiFENesin 400 MG tablet           Note that more than 30 minutes was spent in preparing discharge papers, discussing discharge with patient, medication review, etc.    Signed:  Electronically signed by Juve Mazariegos DO on 2/18/2024 at 2:36 PM

## 2024-02-18 NOTE — ED NOTES
Patient called out to RN stating, \"I need help\", patient reports episode of dizziness, nausea, and general malaise. This RN called admitting provider, zofran was ordered and administered. Patient currently talking on the phone and in no current distress.

## 2024-02-18 NOTE — ED NOTES
Patient states she is feeling extremely anxious and requested something for anxiety. Provider called, verbal order for 25 mg of vistaril PO ordered and administered. Patient provided with tv, call light in reach. No other stated problems or concerns at this time.

## 2024-02-19 LAB
EKG ATRIAL RATE: 81 BPM
EKG P AXIS: 83 DEGREES
EKG P-R INTERVAL: 154 MS
EKG Q-T INTERVAL: 394 MS
EKG QRS DURATION: 86 MS
EKG QTC CALCULATION (BAZETT): 457 MS
EKG R AXIS: -13 DEGREES
EKG T AXIS: 90 DEGREES
EKG VENTRICULAR RATE: 81 BPM

## 2024-02-19 PROCEDURE — 93010 ELECTROCARDIOGRAM REPORT: CPT | Performed by: INTERNAL MEDICINE

## 2024-02-20 ENCOUNTER — HOSPITAL ENCOUNTER (INPATIENT)
Age: 69
LOS: 1 days | Discharge: HOME | DRG: 640 | End: 2024-02-23
Attending: STUDENT IN AN ORGANIZED HEALTH CARE EDUCATION/TRAINING PROGRAM | Admitting: STUDENT IN AN ORGANIZED HEALTH CARE EDUCATION/TRAINING PROGRAM
Payer: MEDICARE

## 2024-02-20 ENCOUNTER — APPOINTMENT (OUTPATIENT)
Dept: GENERAL RADIOLOGY | Age: 69
DRG: 640 | End: 2024-02-20
Payer: MEDICARE

## 2024-02-20 DIAGNOSIS — N18.6 ESRD (END STAGE RENAL DISEASE) (HCC): ICD-10-CM

## 2024-02-20 DIAGNOSIS — E87.5 HYPERKALEMIA: Primary | ICD-10-CM

## 2024-02-20 DIAGNOSIS — N18.6 END STAGE RENAL DISEASE (HCC): ICD-10-CM

## 2024-02-20 PROCEDURE — 99285 EMERGENCY DEPT VISIT HI MDM: CPT

## 2024-02-20 PROCEDURE — 71045 X-RAY EXAM CHEST 1 VIEW: CPT

## 2024-02-20 PROCEDURE — 80048 BASIC METABOLIC PNL TOTAL CA: CPT

## 2024-02-20 PROCEDURE — 83880 ASSAY OF NATRIURETIC PEPTIDE: CPT

## 2024-02-20 PROCEDURE — 84484 ASSAY OF TROPONIN QUANT: CPT

## 2024-02-20 PROCEDURE — 85025 COMPLETE CBC W/AUTO DIFF WBC: CPT

## 2024-02-20 PROCEDURE — 96375 TX/PRO/DX INJ NEW DRUG ADDON: CPT

## 2024-02-20 PROCEDURE — 0202U NFCT DS 22 TRGT SARS-COV-2: CPT

## 2024-02-20 PROCEDURE — 96374 THER/PROPH/DIAG INJ IV PUSH: CPT

## 2024-02-20 RX ORDER — ONDANSETRON 2 MG/ML
4 INJECTION INTRAMUSCULAR; INTRAVENOUS ONCE
Status: COMPLETED | OUTPATIENT
Start: 2024-02-20 | End: 2024-02-21

## 2024-02-20 RX ORDER — FENTANYL CITRATE 50 UG/ML
50 INJECTION, SOLUTION INTRAMUSCULAR; INTRAVENOUS ONCE
Status: COMPLETED | OUTPATIENT
Start: 2024-02-20 | End: 2024-02-21

## 2024-02-20 ASSESSMENT — PAIN SCALES - GENERAL: PAINLEVEL_OUTOF10: 10

## 2024-02-20 ASSESSMENT — PAIN - FUNCTIONAL ASSESSMENT: PAIN_FUNCTIONAL_ASSESSMENT: 0-10

## 2024-02-21 ENCOUNTER — APPOINTMENT (OUTPATIENT)
Dept: CT IMAGING | Age: 69
DRG: 640 | End: 2024-02-21
Payer: MEDICARE

## 2024-02-21 PROBLEM — D64.9 ANEMIA: Status: ACTIVE | Noted: 2021-11-30

## 2024-02-21 PROBLEM — R59.0 MEDIASTINAL LYMPHADENOPATHY: Status: ACTIVE | Noted: 2024-02-21

## 2024-02-21 LAB
ALBUMIN SERPL-MCNC: 3.7 G/DL (ref 3.5–5.2)
ALP SERPL-CCNC: 91 U/L (ref 35–104)
ALT SERPL-CCNC: 33 U/L (ref 0–32)
ANION GAP SERPL CALCULATED.3IONS-SCNC: 22 MMOL/L (ref 7–16)
ANION GAP SERPL CALCULATED.3IONS-SCNC: 25 MMOL/L (ref 7–16)
AST SERPL-CCNC: 39 U/L (ref 0–31)
B PARAP IS1001 DNA NPH QL NAA+NON-PROBE: NOT DETECTED
B PERT DNA SPEC QL NAA+PROBE: NOT DETECTED
BASOPHILS # BLD: 0.02 K/UL (ref 0–0.2)
BASOPHILS # BLD: 0.02 K/UL (ref 0–0.2)
BASOPHILS NFR BLD: 0 % (ref 0–2)
BASOPHILS NFR BLD: 0 % (ref 0–2)
BILIRUB SERPL-MCNC: 0.2 MG/DL (ref 0–1.2)
BNP SERPL-MCNC: ABNORMAL PG/ML (ref 0–125)
BUN SERPL-MCNC: 82 MG/DL (ref 6–23)
BUN SERPL-MCNC: 86 MG/DL (ref 6–23)
C PNEUM DNA NPH QL NAA+NON-PROBE: NOT DETECTED
CALCIUM SERPL-MCNC: 7.4 MG/DL (ref 8.6–10.2)
CALCIUM SERPL-MCNC: 7.8 MG/DL (ref 8.6–10.2)
CHLORIDE SERPL-SCNC: 94 MMOL/L (ref 98–107)
CHLORIDE SERPL-SCNC: 99 MMOL/L (ref 98–107)
CO2 SERPL-SCNC: 18 MMOL/L (ref 22–29)
CO2 SERPL-SCNC: 21 MMOL/L (ref 22–29)
CREAT SERPL-MCNC: 10.2 MG/DL (ref 0.5–1)
CREAT SERPL-MCNC: 10.5 MG/DL (ref 0.5–1)
EOSINOPHIL # BLD: 0.11 K/UL (ref 0.05–0.5)
EOSINOPHIL # BLD: 0.11 K/UL (ref 0.05–0.5)
EOSINOPHILS RELATIVE PERCENT: 2 % (ref 0–6)
EOSINOPHILS RELATIVE PERCENT: 2 % (ref 0–6)
ERYTHROCYTE [DISTWIDTH] IN BLOOD BY AUTOMATED COUNT: 14.4 % (ref 11.5–15)
ERYTHROCYTE [DISTWIDTH] IN BLOOD BY AUTOMATED COUNT: 14.5 % (ref 11.5–15)
FLUAV RNA NPH QL NAA+NON-PROBE: NOT DETECTED
FLUBV RNA NPH QL NAA+NON-PROBE: NOT DETECTED
GFR SERPL CREATININE-BSD FRML MDRD: 4 ML/MIN/1.73M2
GFR SERPL CREATININE-BSD FRML MDRD: 4 ML/MIN/1.73M2
GLUCOSE BLD-MCNC: 103 MG/DL (ref 74–99)
GLUCOSE BLD-MCNC: 238 MG/DL (ref 74–99)
GLUCOSE BLD-MCNC: 385 MG/DL (ref 74–99)
GLUCOSE SERPL-MCNC: 101 MG/DL (ref 74–99)
GLUCOSE SERPL-MCNC: 192 MG/DL (ref 74–99)
HADV DNA NPH QL NAA+NON-PROBE: NOT DETECTED
HCOV 229E RNA NPH QL NAA+NON-PROBE: NOT DETECTED
HCOV HKU1 RNA NPH QL NAA+NON-PROBE: NOT DETECTED
HCOV NL63 RNA NPH QL NAA+NON-PROBE: NOT DETECTED
HCOV OC43 RNA NPH QL NAA+NON-PROBE: NOT DETECTED
HCT VFR BLD AUTO: 29 % (ref 34–48)
HCT VFR BLD AUTO: 30.9 % (ref 34–48)
HGB BLD-MCNC: 9.3 G/DL (ref 11.5–15.5)
HGB BLD-MCNC: 9.6 G/DL (ref 11.5–15.5)
HMPV RNA NPH QL NAA+NON-PROBE: NOT DETECTED
HPIV1 RNA NPH QL NAA+NON-PROBE: NOT DETECTED
HPIV2 RNA NPH QL NAA+NON-PROBE: NOT DETECTED
HPIV3 RNA NPH QL NAA+NON-PROBE: NOT DETECTED
HPIV4 RNA NPH QL NAA+NON-PROBE: NOT DETECTED
IMM GRANULOCYTES # BLD AUTO: 0.04 K/UL (ref 0–0.58)
IMM GRANULOCYTES # BLD AUTO: 0.04 K/UL (ref 0–0.58)
IMM GRANULOCYTES NFR BLD: 1 % (ref 0–5)
IMM GRANULOCYTES NFR BLD: 1 % (ref 0–5)
LACTATE BLDV-SCNC: 1 MMOL/L (ref 0.5–2.2)
LYMPHOCYTES NFR BLD: 0.81 K/UL (ref 1.5–4)
LYMPHOCYTES NFR BLD: 0.97 K/UL (ref 1.5–4)
LYMPHOCYTES RELATIVE PERCENT: 11 % (ref 20–42)
LYMPHOCYTES RELATIVE PERCENT: 14 % (ref 20–42)
M PNEUMO DNA NPH QL NAA+NON-PROBE: NOT DETECTED
MCH RBC QN AUTO: 32.4 PG (ref 26–35)
MCH RBC QN AUTO: 32.9 PG (ref 26–35)
MCHC RBC AUTO-ENTMCNC: 31.1 G/DL (ref 32–34.5)
MCHC RBC AUTO-ENTMCNC: 32.1 G/DL (ref 32–34.5)
MCV RBC AUTO: 102.5 FL (ref 80–99.9)
MCV RBC AUTO: 104.4 FL (ref 80–99.9)
MONOCYTES NFR BLD: 0.47 K/UL (ref 0.1–0.95)
MONOCYTES NFR BLD: 0.49 K/UL (ref 0.1–0.95)
MONOCYTES NFR BLD: 7 % (ref 2–12)
MONOCYTES NFR BLD: 7 % (ref 2–12)
NEUTROPHILS NFR BLD: 76 % (ref 43–80)
NEUTROPHILS NFR BLD: 80 % (ref 43–80)
NEUTS SEG NFR BLD: 5.2 K/UL (ref 1.8–7.3)
NEUTS SEG NFR BLD: 5.69 K/UL (ref 1.8–7.3)
PLATELET # BLD AUTO: 119 K/UL (ref 130–450)
PLATELET # BLD AUTO: 127 K/UL (ref 130–450)
PMV BLD AUTO: 10.5 FL (ref 7–12)
PMV BLD AUTO: 10.9 FL (ref 7–12)
POTASSIUM SERPL-SCNC: 5.8 MMOL/L (ref 3.5–5)
POTASSIUM SERPL-SCNC: 5.9 MMOL/L (ref 3.5–5)
PROT SERPL-MCNC: 6.7 G/DL (ref 6.4–8.3)
RBC # BLD AUTO: 2.83 M/UL (ref 3.5–5.5)
RBC # BLD AUTO: 2.96 M/UL (ref 3.5–5.5)
RSV RNA NPH QL NAA+NON-PROBE: NOT DETECTED
RV+EV RNA NPH QL NAA+NON-PROBE: NOT DETECTED
SARS-COV-2 RNA NPH QL NAA+NON-PROBE: NOT DETECTED
SODIUM SERPL-SCNC: 137 MMOL/L (ref 132–146)
SODIUM SERPL-SCNC: 142 MMOL/L (ref 132–146)
SPECIMEN DESCRIPTION: NORMAL
TROPONIN I SERPL HS-MCNC: 69 NG/L (ref 0–9)
TROPONIN I SERPL HS-MCNC: 71 NG/L (ref 0–9)
WBC OTHER # BLD: 6.8 K/UL (ref 4.5–11.5)
WBC OTHER # BLD: 7.1 K/UL (ref 4.5–11.5)

## 2024-02-21 PROCEDURE — G0378 HOSPITAL OBSERVATION PER HR: HCPCS

## 2024-02-21 PROCEDURE — 80053 COMPREHEN METABOLIC PANEL: CPT

## 2024-02-21 PROCEDURE — 6370000000 HC RX 637 (ALT 250 FOR IP): Performed by: STUDENT IN AN ORGANIZED HEALTH CARE EDUCATION/TRAINING PROGRAM

## 2024-02-21 PROCEDURE — 6360000002 HC RX W HCPCS: Performed by: STUDENT IN AN ORGANIZED HEALTH CARE EDUCATION/TRAINING PROGRAM

## 2024-02-21 PROCEDURE — 90935 HEMODIALYSIS ONE EVALUATION: CPT

## 2024-02-21 PROCEDURE — 6360000002 HC RX W HCPCS: Performed by: INTERNAL MEDICINE

## 2024-02-21 PROCEDURE — 74177 CT ABD & PELVIS W/CONTRAST: CPT

## 2024-02-21 PROCEDURE — 96374 THER/PROPH/DIAG INJ IV PUSH: CPT

## 2024-02-21 PROCEDURE — 82962 GLUCOSE BLOOD TEST: CPT

## 2024-02-21 PROCEDURE — 84484 ASSAY OF TROPONIN QUANT: CPT

## 2024-02-21 PROCEDURE — 99223 1ST HOSP IP/OBS HIGH 75: CPT | Performed by: INTERNAL MEDICINE

## 2024-02-21 PROCEDURE — 2580000003 HC RX 258: Performed by: STUDENT IN AN ORGANIZED HEALTH CARE EDUCATION/TRAINING PROGRAM

## 2024-02-21 PROCEDURE — 6360000004 HC RX CONTRAST MEDICATION: Performed by: RADIOLOGY

## 2024-02-21 PROCEDURE — 96375 TX/PRO/DX INJ NEW DRUG ADDON: CPT

## 2024-02-21 PROCEDURE — 96376 TX/PRO/DX INJ SAME DRUG ADON: CPT

## 2024-02-21 PROCEDURE — 99221 1ST HOSP IP/OBS SF/LOW 40: CPT | Performed by: STUDENT IN AN ORGANIZED HEALTH CARE EDUCATION/TRAINING PROGRAM

## 2024-02-21 PROCEDURE — 6370000000 HC RX 637 (ALT 250 FOR IP)

## 2024-02-21 PROCEDURE — 94640 AIRWAY INHALATION TREATMENT: CPT

## 2024-02-21 PROCEDURE — 2500000003 HC RX 250 WO HCPCS: Performed by: STUDENT IN AN ORGANIZED HEALTH CARE EDUCATION/TRAINING PROGRAM

## 2024-02-21 PROCEDURE — 96372 THER/PROPH/DIAG INJ SC/IM: CPT

## 2024-02-21 PROCEDURE — 6370000000 HC RX 637 (ALT 250 FOR IP): Performed by: INTERNAL MEDICINE

## 2024-02-21 PROCEDURE — 5A1D70Z PERFORMANCE OF URINARY FILTRATION, INTERMITTENT, LESS THAN 6 HOURS PER DAY: ICD-10-PCS | Performed by: INTERNAL MEDICINE

## 2024-02-21 PROCEDURE — 83605 ASSAY OF LACTIC ACID: CPT

## 2024-02-21 RX ORDER — ACETAMINOPHEN 325 MG/1
650 TABLET ORAL EVERY 6 HOURS PRN
Status: DISCONTINUED | OUTPATIENT
Start: 2024-02-21 | End: 2024-02-23 | Stop reason: HOSPADM

## 2024-02-21 RX ORDER — SODIUM CHLORIDE 0.9 % (FLUSH) 0.9 %
5-40 SYRINGE (ML) INJECTION EVERY 12 HOURS SCHEDULED
Status: DISCONTINUED | OUTPATIENT
Start: 2024-02-21 | End: 2024-02-23 | Stop reason: HOSPADM

## 2024-02-21 RX ORDER — SODIUM CHLORIDE 9 MG/ML
INJECTION, SOLUTION INTRAVENOUS PRN
Status: DISCONTINUED | OUTPATIENT
Start: 2024-02-21 | End: 2024-02-23 | Stop reason: HOSPADM

## 2024-02-21 RX ORDER — SODIUM CHLORIDE 0.9 % (FLUSH) 0.9 %
5-40 SYRINGE (ML) INJECTION PRN
Status: DISCONTINUED | OUTPATIENT
Start: 2024-02-21 | End: 2024-02-23 | Stop reason: HOSPADM

## 2024-02-21 RX ORDER — METHYLPREDNISOLONE SODIUM SUCCINATE 40 MG/ML
40 INJECTION, POWDER, LYOPHILIZED, FOR SOLUTION INTRAMUSCULAR; INTRAVENOUS EVERY 6 HOURS
Status: COMPLETED | OUTPATIENT
Start: 2024-02-21 | End: 2024-02-22

## 2024-02-21 RX ORDER — INSULIN LISPRO 100 [IU]/ML
0-4 INJECTION, SOLUTION INTRAVENOUS; SUBCUTANEOUS
Status: DISCONTINUED | OUTPATIENT
Start: 2024-02-21 | End: 2024-02-22

## 2024-02-21 RX ORDER — MIDODRINE HYDROCHLORIDE 5 MG/1
10 TABLET ORAL 2 TIMES DAILY PRN
Status: DISCONTINUED | OUTPATIENT
Start: 2024-02-21 | End: 2024-02-23 | Stop reason: HOSPADM

## 2024-02-21 RX ORDER — ASPIRIN 81 MG/1
81 TABLET, CHEWABLE ORAL EVERY MORNING
Status: DISCONTINUED | OUTPATIENT
Start: 2024-02-21 | End: 2024-02-23 | Stop reason: HOSPADM

## 2024-02-21 RX ORDER — IPRATROPIUM BROMIDE AND ALBUTEROL SULFATE 2.5; .5 MG/3ML; MG/3ML
1 SOLUTION RESPIRATORY (INHALATION)
Status: DISCONTINUED | OUTPATIENT
Start: 2024-02-21 | End: 2024-02-23 | Stop reason: HOSPADM

## 2024-02-21 RX ORDER — GUAIFENESIN/DEXTROMETHORPHAN 100-10MG/5
5 SYRUP ORAL EVERY 4 HOURS PRN
Status: DISCONTINUED | OUTPATIENT
Start: 2024-02-21 | End: 2024-02-23 | Stop reason: HOSPADM

## 2024-02-21 RX ORDER — ONDANSETRON 2 MG/ML
4 INJECTION INTRAMUSCULAR; INTRAVENOUS EVERY 6 HOURS PRN
Status: DISCONTINUED | OUTPATIENT
Start: 2024-02-21 | End: 2024-02-23 | Stop reason: HOSPADM

## 2024-02-21 RX ORDER — BUDESONIDE 0.5 MG/2ML
1000 INHALANT ORAL
Status: DISCONTINUED | OUTPATIENT
Start: 2024-02-21 | End: 2024-02-23 | Stop reason: HOSPADM

## 2024-02-21 RX ORDER — INSULIN LISPRO 100 [IU]/ML
2 INJECTION, SOLUTION INTRAVENOUS; SUBCUTANEOUS ONCE
Status: COMPLETED | OUTPATIENT
Start: 2024-02-21 | End: 2024-02-21

## 2024-02-21 RX ORDER — POLYETHYLENE GLYCOL 3350 17 G/17G
17 POWDER, FOR SOLUTION ORAL DAILY PRN
Status: DISCONTINUED | OUTPATIENT
Start: 2024-02-21 | End: 2024-02-23 | Stop reason: HOSPADM

## 2024-02-21 RX ORDER — DEXTROSE MONOHYDRATE 100 MG/ML
INJECTION, SOLUTION INTRAVENOUS CONTINUOUS PRN
Status: DISCONTINUED | OUTPATIENT
Start: 2024-02-21 | End: 2024-02-23 | Stop reason: HOSPADM

## 2024-02-21 RX ORDER — ACETAMINOPHEN 650 MG/1
650 SUPPOSITORY RECTAL EVERY 6 HOURS PRN
Status: DISCONTINUED | OUTPATIENT
Start: 2024-02-21 | End: 2024-02-23 | Stop reason: HOSPADM

## 2024-02-21 RX ORDER — CALCIUM ACETATE 667 MG/1
2 CAPSULE ORAL
Status: DISCONTINUED | OUTPATIENT
Start: 2024-02-21 | End: 2024-02-23 | Stop reason: HOSPADM

## 2024-02-21 RX ORDER — CALCIUM GLUCONATE 94 MG/ML
1000 INJECTION, SOLUTION INTRAVENOUS ONCE
Status: COMPLETED | OUTPATIENT
Start: 2024-02-21 | End: 2024-02-21

## 2024-02-21 RX ORDER — ONDANSETRON 4 MG/1
4 TABLET, ORALLY DISINTEGRATING ORAL EVERY 8 HOURS PRN
Status: DISCONTINUED | OUTPATIENT
Start: 2024-02-21 | End: 2024-02-23 | Stop reason: HOSPADM

## 2024-02-21 RX ORDER — HEPARIN SODIUM 10000 [USP'U]/ML
5000 INJECTION, SOLUTION INTRAVENOUS; SUBCUTANEOUS EVERY 8 HOURS
Status: DISCONTINUED | OUTPATIENT
Start: 2024-02-21 | End: 2024-02-23 | Stop reason: HOSPADM

## 2024-02-21 RX ORDER — ATORVASTATIN CALCIUM 40 MG/1
40 TABLET, FILM COATED ORAL EVERY MORNING
Status: DISCONTINUED | OUTPATIENT
Start: 2024-02-21 | End: 2024-02-23 | Stop reason: HOSPADM

## 2024-02-21 RX ORDER — INSULIN LISPRO 100 [IU]/ML
0-4 INJECTION, SOLUTION INTRAVENOUS; SUBCUTANEOUS NIGHTLY
Status: DISCONTINUED | OUTPATIENT
Start: 2024-02-21 | End: 2024-02-22

## 2024-02-21 RX ORDER — LANOLIN ALCOHOL/MO/W.PET/CERES
3 CREAM (GRAM) TOPICAL NIGHTLY PRN
Status: DISCONTINUED | OUTPATIENT
Start: 2024-02-21 | End: 2024-02-23 | Stop reason: HOSPADM

## 2024-02-21 RX ORDER — FAMOTIDINE 20 MG/1
20 TABLET, FILM COATED ORAL DAILY
Status: DISCONTINUED | OUTPATIENT
Start: 2024-02-21 | End: 2024-02-23 | Stop reason: HOSPADM

## 2024-02-21 RX ADMIN — Medication 3 MG: at 21:11

## 2024-02-21 RX ADMIN — METHYLPREDNISOLONE SODIUM SUCCINATE 40 MG: 40 INJECTION INTRAMUSCULAR; INTRAVENOUS at 16:30

## 2024-02-21 RX ADMIN — METHYLPREDNISOLONE SODIUM SUCCINATE 40 MG: 40 INJECTION INTRAMUSCULAR; INTRAVENOUS at 20:58

## 2024-02-21 RX ADMIN — INSULIN HUMAN 10 UNITS: 100 INJECTION, SOLUTION PARENTERAL at 02:12

## 2024-02-21 RX ADMIN — ATORVASTATIN CALCIUM 40 MG: 40 TABLET, FILM COATED ORAL at 13:00

## 2024-02-21 RX ADMIN — GUAIFENESIN AND DEXTROMETHORPHAN 5 ML: 100; 10 SYRUP ORAL at 20:58

## 2024-02-21 RX ADMIN — SODIUM ZIRCONIUM CYCLOSILICATE 10 G: 10 POWDER, FOR SUSPENSION ORAL at 02:42

## 2024-02-21 RX ADMIN — ONDANSETRON 4 MG: 4 TABLET, ORALLY DISINTEGRATING ORAL at 21:17

## 2024-02-21 RX ADMIN — INSULIN LISPRO 2 UNITS: 100 INJECTION, SOLUTION INTRAVENOUS; SUBCUTANEOUS at 21:13

## 2024-02-21 RX ADMIN — FENTANYL CITRATE 50 MCG: 50 INJECTION INTRAMUSCULAR; INTRAVENOUS at 00:03

## 2024-02-21 RX ADMIN — CALCIUM ACETATE 1334 MG: 667 CAPSULE ORAL at 13:00

## 2024-02-21 RX ADMIN — IPRATROPIUM BROMIDE AND ALBUTEROL SULFATE 1 DOSE: 2.5; .5 SOLUTION RESPIRATORY (INHALATION) at 20:28

## 2024-02-21 RX ADMIN — INSULIN LISPRO 4 UNITS: 100 INJECTION, SOLUTION INTRAVENOUS; SUBCUTANEOUS at 21:12

## 2024-02-21 RX ADMIN — HEPARIN SODIUM 5000 UNITS: 10000 INJECTION INTRAVENOUS; SUBCUTANEOUS at 16:31

## 2024-02-21 RX ADMIN — ASPIRIN 81 MG CHEWABLE TABLET 81 MG: 81 TABLET CHEWABLE at 13:00

## 2024-02-21 RX ADMIN — IPRATROPIUM BROMIDE AND ALBUTEROL SULFATE 1 DOSE: 2.5; .5 SOLUTION RESPIRATORY (INHALATION) at 16:13

## 2024-02-21 RX ADMIN — ONDANSETRON 4 MG: 2 INJECTION INTRAMUSCULAR; INTRAVENOUS at 00:03

## 2024-02-21 RX ADMIN — FAMOTIDINE 20 MG: 20 TABLET ORAL at 13:00

## 2024-02-21 RX ADMIN — CALCIUM GLUCONATE 1000 MG: 98 INJECTION, SOLUTION INTRAVENOUS at 02:12

## 2024-02-21 RX ADMIN — IOPAMIDOL 75 ML: 755 INJECTION, SOLUTION INTRAVENOUS at 01:27

## 2024-02-21 RX ADMIN — SODIUM CHLORIDE, PRESERVATIVE FREE 10 ML: 5 INJECTION INTRAVENOUS at 21:11

## 2024-02-21 RX ADMIN — BUDESONIDE 1000 MCG: 0.5 SUSPENSION RESPIRATORY (INHALATION) at 20:28

## 2024-02-21 RX ADMIN — DEXTROSE MONOHYDRATE 250 ML: 100 INJECTION, SOLUTION INTRAVENOUS at 02:13

## 2024-02-21 ASSESSMENT — PAIN - FUNCTIONAL ASSESSMENT
PAIN_FUNCTIONAL_ASSESSMENT: 0-10
PAIN_FUNCTIONAL_ASSESSMENT: NONE - DENIES PAIN

## 2024-02-21 ASSESSMENT — PAIN SCALES - GENERAL
PAINLEVEL_OUTOF10: 8
PAINLEVEL_OUTOF10: 0

## 2024-02-21 NOTE — H&P
[facility] Hospitalist Group History and Physical      CHIEF COMPLAINT: Cough and congestion    History of Present Illness: 68-year-old lady with past medical history significant for essential hypertension.  Renal disease on hemodialysis diabetes mellitus type 2 anxiety and depression, anemia of CKD presented to ED with cough and congestion for last 4 days.  Her symptoms started 4 days ago with cough and runny nose and fever when she came to ED to get checked.  Testing was done including viral panel which was negative, and she was sent home.  She stated that her symptoms never improved and Are getting worse.  She is on hemodialysis and missed her dialysis yesterday because of upper respiratory symptoms.  She does have history of COPD recently on azithromycin for worsening COPD.  She did have nausea and vomiting along with diarrhea to, no blood.  She does wear oxygen at home at 2 to 3 L and her oxygen saturation requirement does not go up.  Denies any chest pain, no fever or chills, no lightheadedness or dizziness, no focal deficits.  In ED her potassium was found to be 5.9 because of missed dialysis.  When I examined her she did state that her upper respite symptoms are gradually improving.    Informant(s) for H&P: Patient    REVIEW OF SYSTEMS:  A comprehensive review of systems was negative except for: what is in the HPI      PMH:  Past Medical History:   Diagnosis Date    Acute on chronic heart failure with preserved ejection fraction (HCC) 02/07/2023    Acute pulmonary embolism (HCC) 12/03/2022    Anemia due to stage 5 chronic kidney disease, not on chronic dialysis (HCC) 02/08/2023    Anemia in CKD (chronic kidney disease) 11/30/2021    Anxiety and depression 01/19/2022    Aortic stenosis, mild 02/08/2023 12/2022    At high risk for falls 01/19/2022    Brain aneurysm     CLABSI (central line-associated bloodstream infection) 11/19/2021    Cough 01/19/2022    Diabetes mellitus ([facility]) 2006    Diabetes

## 2024-02-21 NOTE — ED NOTES
ED to Inpatient Handoff Report    Notified floor that electronic handoff available and patient ready for transport to room 424.    Safety Risks: None identified    Patient in Restraints: no    Constant Observer or Patient : no    Telemetry Monitoring Ordered: Yes          Order to transfer to unit without monitor: YES    Last MEWS: 1 Time completed: 1759    Deterioration Index: 41.42    Vitals:    02/21/24 1345 02/21/24 1400 02/21/24 1613 02/21/24 1759   BP: 116/87 (!) 107/92  139/72   Pulse: 91 87 81 85   Resp: 21 23 19   Temp:    99 °F (37.2 °C)   TempSrc:    Oral   SpO2:    92%   Weight:       Height:           Opportunity for questions and clarification was provided.

## 2024-02-21 NOTE — FLOWSHEET NOTE
02/21/24 1152   Vital Signs   /69   Temp 97 °F (36.1 °C)   Pulse 52   Respirations 18   Post-Hemodialysis Assessment   Post-Treatment Procedures Blood returned;Access bleeding time < 10 minutes   Machine Disinfection Process Exterior Machine Disinfection   Rinseback Volume (ml) 300 ml   Blood Volume Processed (Liters) 89.8 L   Dialyzer Clearance Moderately streaked   Duration of Treatment (minutes) 240 minutes   Heparin Amount Administered During Treatment (mL) 0 mL   Hemodialysis Intake (ml) 300 ml   Hemodialysis Output (ml) 3300 ml   NET Removed (ml) 3000   Tolerated Treatment Good   Interventions Taken Ultrafiltration goal decreased   Patient Response to Treatment tolerted tx well, 3L fluid removed, pt complained of cramping during tx uf goal decreased and symptoms went away, complaining of cramping again after tx finished and needles were pulled   Bilateral Breath Sounds Diminished   Edema Right lower extremity;Left lower extremity   Time Off 1142   Patient Disposition Return to room   Observations & Evaluations   Level of Consciousness 0   Oriented X 3   Heart Rhythm Regular   Respiratory Quality/Effort Unlabored;Dyspnea with exertion   O2 Device Nasal cannula   Skin Condition/Temp Dry;Warm;Swollen/edematous     Tolerated  HD 4 hr on 2 k 3 ca bath, 3000 ml removed, pt complained of cramping during tx uf goal decreased and symptoms went away, complaining of cramping again after tx finished and needles were pulled. Needles removed and pressure held until hemostasis achieved and dressing applied. Pt alert and vitals stable at time of transport, report to RN, patient remains in care of staff.

## 2024-02-21 NOTE — PROGRESS NOTES
Patient off unit at time of assessment.     Electronically signed by TAWNYA Canada NP on 2/21/2024 at 11:42 AM

## 2024-02-21 NOTE — ED NOTES
Radiology Procedure Waiver   Name: Lizette Murray  : 1955  MRN: [id number]    Date:  24    Time: 12:21 AM EST    Benefits of immediately proceeding with radiology exam(s) without pre-testing outweigh the risks or are not indicated as specified below and therefore the following is/are being waived:    [x] Benefits of immediate radiology exam(s) outweigh any risk.                                               OR    Pre-exam testing is not indicated for the following reason(s):  [] Pregnancy test   [] Patients LMP on-time and regular.   [] Patient had Tubal Ligation or has other Contraception Device.   [] Patient  is Menopausal or Premenarcheal.    [] Patient had Full or Partial Hysterectomy.    [] Protocol for CT contrast allegry   [] Patient has tolerated well previously   [] Patient does not have a true allergy    [] MRI Questionnaire     [] BUN/Creatinine   [] Patient age w/no hx of renal dysfunction.   [] Patient on Dialysis.   [] Recent Normal Labs.  Electronically signed by Raza Kelly MD on 24 at 12:21 AM Raza Danielson MD  24 0021

## 2024-02-21 NOTE — CONSULTS
Inpatient Medical Oncology  Attending Consult Note      Reason for Visit:   persistent cough, worse mediastinal lymphadenopathy.     Referring Physician:  TOMY STONE     PCP:  Lourdes Murrell MD    History of Present Illness:  Ms Murray is a 68-year-old female with past medical history significant for end-stage renal disease on HD, type II DM, hypertension, anemia of CKD, COPD and depression/anxiety who presented to the ED with complaints of shortness of breath and lower abdominal pain. Associated with of persistent nonproductive cough cough, nausea and vomiting along with diarrhea.  Patient states she has postnasal drip that has been chronic. Patient missed dialysis yesterday due to flulike symptoms. She currently denies any fever or chills, chest pain, palpitations, abdominal pain.    Laboratory workup was pertinent for hyperkalemia K5.8 with elevated BUN and creatinine in keeping with history of ESRD, proBNP elevated at 51 K and troponin 69, WBC normal at 6.0 with no immature cells, hemoglobin 10.3 and hematocrit 33.2, .  Respiratory viral panel was negative.  Recent CTA pulmonary with contrast was concerning for interval increase in size of mediastinal lymph nodes and a 1.5 cm subcarinal lymph node, chest CT is currently pending. Medical oncology was consulted for further evaluation of mediastinal lymph nodes given short interval change.    Review of Systems;  CONSTITUTIONAL: No fever, chills. Good appetite and energy level.   ENMT: Eyes: No diplopia; Nose: No epistaxis. Mouth: No sore throat.  RESPIRATORY: No hemoptysis, positive for shortness of breath and nonproductive cough.   CARDIOVASCULAR: No chest pain, palpitations.  GASTROINTESTINAL: No nausea/vomiting, abdominal pain, diarrhea/constipation.  GENITOURINARY: No dysuria, urinary frequency, hematuria.  NEURO: No syncope, presyncope, headache.  Remainder ROS: NEGATIVE    Past Medical History:      Diagnosis Date    Acute on chronic heart

## 2024-02-21 NOTE — CONSULTS
[facility]  MD Sidney Amezcua MD ALI HASSAN MD .  Consultation  Patient's Name: Lizette Murray  10:29 AM  2/22/2024    Nephrologist: Sedrick Jones MD    Reason for Consult:  ESRD   Requesting Physician:  Lourdes Murrell MD    Chief Complaint:  shortness of breath    History Obtained From:  patient , records staff     History of Present Ilness:      68-year-old lady with past medical history significant for essential hypertension.  Renal disease on hemodialysis diabetes mellitus type 2 anxiety and depression, anemia of CKD presented to ED with cough and congestion for last 4 days  Nephrology asked to see for HD needs   Pt underwent HD today with no reported issues 3 L removed   Pt seen in ED she is sitting in chair , she appear comfortable   She is willing to have another HD in am   UF efforts has been offset by her getting cramping on HD       Past Medical History:   Diagnosis Date    Acute on chronic heart failure with preserved ejection fraction ([facility]) 02/07/2023    Acute pulmonary embolism ([facility]) 12/03/2022    Anemia due to stage 5 chronic kidney disease, not on chronic dialysis ([facility]) 02/08/2023    Anemia in CKD (chronic kidney disease) 11/30/2021    Anxiety and depression 01/19/2022    Aortic stenosis, mild 02/08/2023 12/2022    At high risk for falls 01/19/2022    Brain aneurysm     CLABSI (central line-associated bloodstream infection) 11/19/2021    Cough 01/19/2022    Diabetes mellitus ([facility]) 2006    Diabetes mellitus type 2 with complications ([facility]) 11/30/2021    Dizziness on standing 01/19/2022    End-stage renal disease on hemodialysis ([facility]) 01/19/2023    ESRD (end stage renal disease) ([facility]) 11/19/2021    ESRD on hemodialysis ([facility]) 11/19/2021    Essential hypertension 11/30/2021    Fever, unspecified     Gastrointestinal hemorrhage 11/30/2021    H/O heart artery stent 2015    Done in [place]    History of Clostridioides difficile colitis 01/19/2022    History      Ionized Calcium:  No results found for: \"IONCA\"  Magnesium:    Lab Results   Component Value Date/Time    MG 2.3 02/22/2024 05:27 AM     Phosphorus:    Lab Results   Component Value Date/Time    PHOS 6.4 02/22/2024 05:27 AM     U/A:    Lab Results   Component Value Date/Time    COLORU Yellow 11/18/2021 03:19 AM    PHUR 6.0 11/18/2021 03:19 AM    WBCUA 1-3 11/18/2021 03:19 AM    RBCUA 0-1 11/18/2021 03:19 AM    BACTERIA FEW 11/18/2021 03:19 AM    CLARITYU Clear 11/18/2021 03:19 AM    SPECGRAV 1.020 11/18/2021 03:19 AM    LEUKOCYTESUR Negative 11/18/2021 03:19 AM    UROBILINOGEN 0.2 11/18/2021 03:19 AM    BILIRUBINUR Negative 11/18/2021 03:19 AM    BLOODU SMALL 11/18/2021 03:19 AM    GLUCOSEU 500 11/18/2021 03:19 AM     Microalbumen/Creatinine ratio:  No components found for: \"RUCREAT\"  Iron Saturation:  No components found for: \"PERCENTFE\"  TIBC:    Lab Results   Component Value Date/Time    TIBC 164 05/09/2023 02:45 AM     FERRITIN:    Lab Results   Component Value Date/Time    FERRITIN 2,598 05/09/2023 02:45 AM        Imaging:  CT ABDOMEN PELVIS W IV CONTRAST Additional Contrast? None   Final Result   1. No acute intra-abdominal or pelvic abnormality.   2. Cholelithiasis.   3. Diverticulosis.   4. Moderate fat containing periumbilical hernia.         XR CHEST PORTABLE   Final Result   No acute process.      Cardiomegaly.         CT CHEST WO CONTRAST    (Results Pending)   CT CHEST WO CONTRAST    (Results Pending)       Assessment    1.  ESRD on HD TTS at [facility]   2.  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  Hyperkalemia due to missing HD treatment  5.  Edema, chronic  6   Adenopathy seen by pulm , hematology    Plan     -Hemodialysis today for solute and volume management     -Hemodialysis again in am per her TTS schedule .will try profile 2 on dialysis to minimize cramping     -check iron studies     -continue phoslo tid with meals     -continue supportive care    Thank

## 2024-02-21 NOTE — CONSULTS
clinical staff including nursing and physicians, exceeded 50 minutes.      Please contact us with any questions. Office [phone] or after hours through [employer], x [phone].    Please note that voice recognition technology was used (while wearing a Covid mandated mask) in the preparation of this note and make therefore it may contain inadvertent transcription errors.  If the patient is a COVID 19 isolation patient, the above physical exam reflects that of the examining physician for the day.    Jim Acosta MD,  M.D., F.C.C.P.    [facility]    [facility], [address] [place] [address], [address], [place] [address]  Office Visits:  [phone] [phone]

## 2024-02-21 NOTE — ED PROVIDER NOTES
Department of Emergency Medicine   ED  Provider Note  Admit Date/RoomTime: 2/20/2024 10:23 PM  ED Room: 06/06          History of Present Illness:    2/20/24, Time: 11:04 PM DANIAL Murray is a 68 y.o. female presenting to the ED for complaint of worsening symptoms.  Recently admitted about a few days ago for similar complaints.  States she is having cough congestion coughing up greenish sputum.  Does have a history of COPD as well as chronic kidney disease on hemodialysis.  Recently put on azithromycin for this.  She also elicits having left lower quadrant abdominal pain over the last 1 day with diarrhea nausea and vomiting.  States nothing makes her symptoms better or worse.  Does wear chronic oxygen at home at 2 to 3 L.  Saturating well on her home oxygen.  She otherwise denies any other acute complaints this time.    Review of Systems:   Pertinent positives and negatives are stated within HPI, all other systems reviewed and are negative.        --------------------------------------------- PAST HISTORY ---------------------------------------------  Past Medical History:  has a past medical history of Acute on chronic heart failure with preserved ejection fraction (HCC), Acute pulmonary embolism (HCC), Anemia due to stage 5 chronic kidney disease, not on chronic dialysis (HCC), Anemia in CKD (chronic kidney disease), Anxiety and depression, Aortic stenosis, mild, At high risk for falls, Brain aneurysm, CLABSI (central line-associated bloodstream infection), Cough, Diabetes mellitus ([facility]), Diabetes mellitus type 2 with complications ([facility]), Dizziness on standing, End-stage renal disease on hemodialysis (HCC), ESRD (end stage renal disease) ([facility]), ESRD on hemodialysis (HCC), Essential hypertension, Fever, unspecified, Gastrointestinal hemorrhage, H/O heart artery stent, History of Clostridioides difficile colitis, History of pulmonary embolus (PE), Hyperlipidemia, Hypertension, Leg swelling, Lymphedema  Pt Expectation of Test or Tx.): Will be admitted to the hospital secondary to hyperkalemia requiring dialysis for      Telemetry    The cardiac monitor revealed sinus as interpreted by me. The cardiac monitor was ordered secondary to the patient’s Abdominal pain, shortness of breath and to monitor the patient for dysrhythmia    Please note that the withdrawal or failure to initiate urgent interventions for this patient would likely result in a life threatening deterioration or permanent disability.      Accordingly this patient received 40 minutes of critical care time, excluding separately billable procedures.        Medications   glucose chewable tablet 16 g (has no administration in time range)   dextrose bolus 10% 125 mL (has no administration in time range)     Or   dextrose bolus 10% 250 mL (has no administration in time range)   glucagon injection 1 mg (has no administration in time range)   dextrose 10 % infusion (has no administration in time range)   fentaNYL (SUBLIMAZE) injection 50 mcg (50 mcg IntraVENous Given 2/21/24 0003)   ondansetron (ZOFRAN) injection 4 mg (4 mg IntraVENous Given 2/21/24 0003)   iopamidol (ISOVUE-370) 76 % injection 75 mL (75 mLs IntraVENous Given 2/21/24 0127)   calcium gluconate 10 % injection 1,000 mg (1,000 mg IntraVENous Given 2/21/24 0212)   insulin regular (HUMULIN R;NOVOLIN R) injection 10 Units (10 Units IntraVENous Given 2/21/24 0212)     And   dextrose bolus 10% 250 mL (0 mLs IntraVENous Stopped 2/21/24 0233)   sodium zirconium cyclosilicate (LOKELMA) oral suspension 10 g (10 g Oral Given 2/21/24 0242)         I am the primary provider of record       Medication List        ASK your doctor about these medications      acetaminophen 650 MG extended release tablet  Commonly known as: TYLENOL     aspirin 81 MG chewable tablet     atorvastatin 40 MG tablet  Commonly known as: Lipitor  Take 1 tablet by mouth every morning     azithromycin 500 MG tablet  Commonly known as:

## 2024-02-21 NOTE — PLAN OF CARE
Patient was seen and examined.  Patient admitted by nocturnist this morning for chronic cough, ESRD nephrology consulted for dialysis  I reviewed key labs images cultures  Will continue supportive care steroids added by pulmonology service.

## 2024-02-21 NOTE — ED NOTES
Department of Emergency Medicine  FIRST PROVIDER TRIAGE NOTE             Independent MLP           2/20/24  10:06 PM EST    Date of Encounter: 2/20/24   MRN: [id number]      HPI: Lizette Murray is a 68 y.o. female who presents to the ED for Shortness of Breath (Seen here over the weekend for the same symptoms but now feeling worse. ), Diarrhea, Abdominal Pain (LLQ), and Cough (Green mucus )   Pt presents to ED with shortness of breath, LLQ abdominal pain, diarrhea. Pt missed HD today, rescheduled for tomorrow.      ROS: Negative for cp, back pain, vomiting, or urinary complaints.    PE: Gen Appearance/Constitutional: alert  CV: regular rate  GI: soft, +BS, tender focal LLQ/L periumbilical     Initial Plan of Care: All treatment areas with department are currently occupied. Plan to order/Initiate the following while awaiting opening in ED: labs and imaging studies.  Initiate Treatment-Testing, Proceed toTreatment Area When Bed Available for ED Attending/MLP to Continue Care      Electronically signed by Holly Fontento PA-C   DD: 2/20/24

## 2024-02-22 ENCOUNTER — APPOINTMENT (OUTPATIENT)
Dept: CT IMAGING | Age: 69
DRG: 640 | End: 2024-02-22
Payer: MEDICARE

## 2024-02-22 LAB
ALBUMIN SERPL-MCNC: 4 G/DL (ref 3.5–5.2)
ANION GAP SERPL CALCULATED.3IONS-SCNC: 13 MMOL/L (ref 7–16)
BASOPHILS # BLD: 0 K/UL (ref 0–0.2)
BASOPHILS NFR BLD: 0 % (ref 0–2)
BUN SERPL-MCNC: 39 MG/DL (ref 6–23)
CALCIUM SERPL-MCNC: 8.5 MG/DL (ref 8.6–10.2)
CHLORIDE SERPL-SCNC: 91 MMOL/L (ref 98–107)
CO2 SERPL-SCNC: 29 MMOL/L (ref 22–29)
CREAT SERPL-MCNC: 6.1 MG/DL (ref 0.5–1)
EOSINOPHIL # BLD: 0 K/UL (ref 0.05–0.5)
EOSINOPHILS RELATIVE PERCENT: 0 % (ref 0–6)
ERYTHROCYTE [DISTWIDTH] IN BLOOD BY AUTOMATED COUNT: 14.2 % (ref 11.5–15)
FOLATE SERPL-MCNC: 13.2 NG/ML (ref 4.8–24.2)
GFR SERPL CREATININE-BSD FRML MDRD: 7 ML/MIN/1.73M2
GLUCOSE BLD-MCNC: 176 MG/DL (ref 74–99)
GLUCOSE BLD-MCNC: 338 MG/DL (ref 74–99)
GLUCOSE BLD-MCNC: 458 MG/DL (ref 74–99)
GLUCOSE SERPL-MCNC: 308 MG/DL (ref 74–99)
HCT VFR BLD AUTO: 30.2 % (ref 34–48)
HGB BLD-MCNC: 9.4 G/DL (ref 11.5–15.5)
IMM RETICS NFR: 19.5 % (ref 3–15.9)
IRON SATN MFR SERPL: 36 % (ref 15–50)
IRON SERPL-MCNC: 61 UG/DL (ref 37–145)
LDH SERPL-CCNC: 276 U/L (ref 135–214)
LYMPHOCYTES NFR BLD: 0.15 K/UL (ref 1.5–4)
LYMPHOCYTES RELATIVE PERCENT: 6 % (ref 20–42)
MAGNESIUM SERPL-MCNC: 2.3 MG/DL (ref 1.6–2.6)
MCH RBC QN AUTO: 32.5 PG (ref 26–35)
MCHC RBC AUTO-ENTMCNC: 31.1 G/DL (ref 32–34.5)
MCV RBC AUTO: 104.5 FL (ref 80–99.9)
MONOCYTES NFR BLD: 0 % (ref 2–12)
MONOCYTES NFR BLD: 0 K/UL (ref 0.1–0.95)
NEUTROPHILS NFR BLD: 94 % (ref 43–80)
NEUTS SEG NFR BLD: 2.25 K/UL (ref 1.8–7.3)
PATH REV BLD -IMP: NORMAL
PHOSPHATE SERPL-MCNC: 6.4 MG/DL (ref 2.5–4.5)
PLATELET # BLD AUTO: 109 K/UL (ref 130–450)
PMV BLD AUTO: 10.9 FL (ref 7–12)
POTASSIUM SERPL-SCNC: 5.3 MMOL/L (ref 3.5–5)
RBC # BLD AUTO: 2.89 M/UL (ref 3.5–5.5)
RBC # BLD: ABNORMAL 10*6/UL
RBC # BLD: ABNORMAL 10*6/UL
RETIC HEMOGLOBIN: 33.5 PG (ref 28.2–36.6)
RETICS # AUTO: 0.06 M/UL
RETICS/RBC NFR AUTO: 2.1 % (ref 0.4–1.9)
SODIUM SERPL-SCNC: 133 MMOL/L (ref 132–146)
TIBC SERPL-MCNC: 170 UG/DL (ref 250–450)
URATE SERPL-MCNC: 4.9 MG/DL (ref 2.4–5.7)
VIT B12 SERPL-MCNC: 612 PG/ML (ref 211–946)
WBC OTHER # BLD: 2.4 K/UL (ref 4.5–11.5)

## 2024-02-22 PROCEDURE — 83540 ASSAY OF IRON: CPT

## 2024-02-22 PROCEDURE — 99233 SBSQ HOSP IP/OBS HIGH 50: CPT | Performed by: STUDENT IN AN ORGANIZED HEALTH CARE EDUCATION/TRAINING PROGRAM

## 2024-02-22 PROCEDURE — 83615 LACTATE (LD) (LDH) ENZYME: CPT

## 2024-02-22 PROCEDURE — 84630 ASSAY OF ZINC: CPT

## 2024-02-22 PROCEDURE — 84165 PROTEIN E-PHORESIS SERUM: CPT

## 2024-02-22 PROCEDURE — 96372 THER/PROPH/DIAG INJ SC/IM: CPT

## 2024-02-22 PROCEDURE — 6370000000 HC RX 637 (ALT 250 FOR IP): Performed by: STUDENT IN AN ORGANIZED HEALTH CARE EDUCATION/TRAINING PROGRAM

## 2024-02-22 PROCEDURE — 83550 IRON BINDING TEST: CPT

## 2024-02-22 PROCEDURE — 85045 AUTOMATED RETICULOCYTE COUNT: CPT

## 2024-02-22 PROCEDURE — 71250 CT THORAX DX C-: CPT

## 2024-02-22 PROCEDURE — 90935 HEMODIALYSIS ONE EVALUATION: CPT

## 2024-02-22 PROCEDURE — 6360000002 HC RX W HCPCS: Performed by: INTERNAL MEDICINE

## 2024-02-22 PROCEDURE — 36415 COLL VENOUS BLD VENIPUNCTURE: CPT

## 2024-02-22 PROCEDURE — 84155 ASSAY OF PROTEIN SERUM: CPT

## 2024-02-22 PROCEDURE — G0378 HOSPITAL OBSERVATION PER HR: HCPCS

## 2024-02-22 PROCEDURE — 84550 ASSAY OF BLOOD/URIC ACID: CPT

## 2024-02-22 PROCEDURE — 82607 VITAMIN B-12: CPT

## 2024-02-22 PROCEDURE — 6370000000 HC RX 637 (ALT 250 FOR IP): Performed by: INTERNAL MEDICINE

## 2024-02-22 PROCEDURE — 85025 COMPLETE CBC W/AUTO DIFF WBC: CPT

## 2024-02-22 PROCEDURE — 2580000003 HC RX 258: Performed by: STUDENT IN AN ORGANIZED HEALTH CARE EDUCATION/TRAINING PROGRAM

## 2024-02-22 PROCEDURE — 82746 ASSAY OF FOLIC ACID SERUM: CPT

## 2024-02-22 PROCEDURE — 2500000003 HC RX 250 WO HCPCS: Performed by: STUDENT IN AN ORGANIZED HEALTH CARE EDUCATION/TRAINING PROGRAM

## 2024-02-22 PROCEDURE — 83735 ASSAY OF MAGNESIUM: CPT

## 2024-02-22 PROCEDURE — 80069 RENAL FUNCTION PANEL: CPT

## 2024-02-22 PROCEDURE — 94640 AIRWAY INHALATION TREATMENT: CPT

## 2024-02-22 PROCEDURE — 82962 GLUCOSE BLOOD TEST: CPT

## 2024-02-22 PROCEDURE — 6370000000 HC RX 637 (ALT 250 FOR IP)

## 2024-02-22 PROCEDURE — 96376 TX/PRO/DX INJ SAME DRUG ADON: CPT

## 2024-02-22 RX ORDER — GUAIFENESIN 400 MG/1
400 TABLET ORAL 2 TIMES DAILY PRN
Status: DISCONTINUED | OUTPATIENT
Start: 2024-02-22 | End: 2024-02-23 | Stop reason: HOSPADM

## 2024-02-22 RX ORDER — INSULIN LISPRO 100 [IU]/ML
4 INJECTION, SOLUTION INTRAVENOUS; SUBCUTANEOUS ONCE
Status: COMPLETED | OUTPATIENT
Start: 2024-02-22 | End: 2024-02-22

## 2024-02-22 RX ORDER — INSULIN LISPRO 100 [IU]/ML
0-8 INJECTION, SOLUTION INTRAVENOUS; SUBCUTANEOUS
Status: DISCONTINUED | OUTPATIENT
Start: 2024-02-22 | End: 2024-02-23 | Stop reason: HOSPADM

## 2024-02-22 RX ORDER — INSULIN LISPRO 100 [IU]/ML
0-4 INJECTION, SOLUTION INTRAVENOUS; SUBCUTANEOUS NIGHTLY
Status: DISCONTINUED | OUTPATIENT
Start: 2024-02-22 | End: 2024-02-23 | Stop reason: HOSPADM

## 2024-02-22 RX ORDER — INSULIN LISPRO 100 [IU]/ML
4 INJECTION, SOLUTION INTRAVENOUS; SUBCUTANEOUS ONCE
Status: DISCONTINUED | OUTPATIENT
Start: 2024-02-22 | End: 2024-02-22 | Stop reason: SDUPTHER

## 2024-02-22 RX ADMIN — HEPARIN SODIUM 5000 UNITS: 10000 INJECTION INTRAVENOUS; SUBCUTANEOUS at 01:48

## 2024-02-22 RX ADMIN — Medication 3 MG: at 21:01

## 2024-02-22 RX ADMIN — HEPARIN SODIUM 5000 UNITS: 10000 INJECTION INTRAVENOUS; SUBCUTANEOUS at 16:18

## 2024-02-22 RX ADMIN — GUAIFENESIN 400 MG: 400 TABLET ORAL at 19:22

## 2024-02-22 RX ADMIN — IPRATROPIUM BROMIDE AND ALBUTEROL SULFATE 1 DOSE: 2.5; .5 SOLUTION RESPIRATORY (INHALATION) at 16:19

## 2024-02-22 RX ADMIN — METHYLPREDNISOLONE SODIUM SUCCINATE 40 MG: 40 INJECTION INTRAMUSCULAR; INTRAVENOUS at 03:58

## 2024-02-22 RX ADMIN — INSULIN LISPRO 4 UNITS: 100 INJECTION, SOLUTION INTRAVENOUS; SUBCUTANEOUS at 06:37

## 2024-02-22 RX ADMIN — CALCIUM ACETATE 1334 MG: 667 CAPSULE ORAL at 16:18

## 2024-02-22 RX ADMIN — GUAIFENESIN AND DEXTROMETHORPHAN 5 ML: 100; 10 SYRUP ORAL at 14:49

## 2024-02-22 RX ADMIN — ASPIRIN 81 MG CHEWABLE TABLET 81 MG: 81 TABLET CHEWABLE at 08:34

## 2024-02-22 RX ADMIN — INSULIN LISPRO 6 UNITS: 100 INJECTION, SOLUTION INTRAVENOUS; SUBCUTANEOUS at 08:36

## 2024-02-22 RX ADMIN — ACETAMINOPHEN 650 MG: 325 TABLET ORAL at 21:01

## 2024-02-22 RX ADMIN — INSULIN LISPRO 4 UNITS: 100 INJECTION, SOLUTION INTRAVENOUS; SUBCUTANEOUS at 21:11

## 2024-02-22 RX ADMIN — SODIUM CHLORIDE, PRESERVATIVE FREE 10 ML: 5 INJECTION INTRAVENOUS at 08:35

## 2024-02-22 RX ADMIN — HEPARIN SODIUM 5000 UNITS: 10000 INJECTION INTRAVENOUS; SUBCUTANEOUS at 08:36

## 2024-02-22 RX ADMIN — SODIUM CHLORIDE, PRESERVATIVE FREE 10 ML: 5 INJECTION INTRAVENOUS at 21:02

## 2024-02-22 RX ADMIN — CALCIUM ACETATE 1334 MG: 667 CAPSULE ORAL at 06:26

## 2024-02-22 RX ADMIN — FAMOTIDINE 20 MG: 20 TABLET ORAL at 08:34

## 2024-02-22 RX ADMIN — METHYLPREDNISOLONE SODIUM SUCCINATE 40 MG: 40 INJECTION INTRAMUSCULAR; INTRAVENOUS at 14:46

## 2024-02-22 RX ADMIN — ATORVASTATIN CALCIUM 40 MG: 40 TABLET, FILM COATED ORAL at 08:34

## 2024-02-22 RX ADMIN — INSULIN LISPRO 4 UNITS: 100 INJECTION, SOLUTION INTRAVENOUS; SUBCUTANEOUS at 06:21

## 2024-02-22 ASSESSMENT — PAIN SCALES - GENERAL
PAINLEVEL_OUTOF10: 0
PAINLEVEL_OUTOF10: 2

## 2024-02-22 ASSESSMENT — PAIN DESCRIPTION - DESCRIPTORS: DESCRIPTORS: ACHING

## 2024-02-22 ASSESSMENT — PAIN DESCRIPTION - LOCATION: LOCATION: HIP

## 2024-02-22 ASSESSMENT — PAIN DESCRIPTION - ORIENTATION: ORIENTATION: RIGHT;LEFT

## 2024-02-22 NOTE — FLOWSHEET NOTE
02/22/24 1253   Vital Signs   BP (!) 112/50   Temp 97.5 °F (36.4 °C)   Pulse 55   Respirations 18   Weight - Scale 109 kg (240 lb 4.8 oz)   Weight Method Bed scale   Percent Weight Change -2.33   Pain Assessment   Pain Assessment None - Denies Pain   Post-Hemodialysis Assessment   Post-Treatment Procedures Blood returned;Access bleeding time < 10 minutes   Machine Disinfection Process Exterior Machine Disinfection;Acid/Vinegar Clean;Heat Disinfect   Rinseback Volume (ml) 300 ml   Blood Volume Processed (Liters) 85 L   Dialyzer Clearance Heavily streaked   Duration of Treatment (minutes) 240 minutes   Hemodialysis Intake (ml) 300 ml   Hemodialysis Output (ml) 3200 ml   NET Removed (ml) 2900   Tolerated Treatment Good   Patient Response to Treatment Tolerated tx well   Bilateral Breath Sounds Diminished   Patient Disposition Return to room   Observations & Evaluations   Level of Consciousness 0   Heart Rhythm Regular   Respiratory Quality/Effort Unlabored

## 2024-02-22 NOTE — PROGRESS NOTES
Pulmonary Progress Note    Admit Date: 2/20/2024  Hospital day                               PCP: Lourdes Murrell MD    Chief Complaint (s):  Patient Active Problem List   Diagnosis    Nausea & vomiting    Sepsis without acute organ dysfunction (HCC)    Essential hypertension    Hyperlipidemia    Diabetes mellitus type 2 with complications (HCC)    Neck pain    Anemia    COVID-19    Mild pulmonary hypertension (HCC)    Obesity, Class III, BMI 40-49.9 (morbid obesity) (HCC)    History of Clostridioides difficile colitis    Anxiety and depression    At high risk for falls    Dizziness on standing    Acute cough    Acute pulmonary embolism (HCC)    H/O heart artery stent    Pulmonary embolism and infarction (HCC)    Diabetes mellitus (HCC)    Hematemesis    ESRD on hemodialysis (HCC)    Leg swelling    Lymphedema of both lower extremities    Rectal bleeding    BRBPR (bright red blood per rectum)    Acute on chronic heart failure with preserved ejection fraction (HCC)    Aortic stenosis, mild    Anemia due to stage 5 chronic kidney disease (HCC)    History of pulmonary embolus (PE)    Presence of inferior vena cava filter    Hyperkalemia    Pneumonia due to organism    Gallstones    Coagulation defect (HCC)    Acute on chronic respiratory failure (HCC)    Dyspnea    Fluid overload    Macrocytosis    Chronic anemia    End stage renal disease (HCC)    Mediastinal lymphadenopathy       Subjective:  Attempted to see the patient both at the bedside as well as dialysis.  The patient has been transported already to CAT scan but she was unavailable for exam.      Vitals:  VITALS:  BP (!) 112/50   Pulse 55   Temp 97.5 °F (36.4 °C)   Resp 18   Ht 1.626 m (5' 4\")   Wt 109 kg (240 lb 4.8 oz)   SpO2 97%   BMI 41.25 kg/m²     24HR INTAKE/OUTPUT:      Intake/Output Summary (Last 24 hours) at 2/22/2024 1407  Last data filed at 2/22/2024 1253  Gross per 24 hour   Intake 300 ml   Output 3200 ml   Net -2900 ml

## 2024-02-22 NOTE — PROGRESS NOTES
Patient off unit at time of assessment.   Electronically signed by TAWNYA Canada NP on 2/22/2024 at 10:43 AM

## 2024-02-22 NOTE — PROGRESS NOTES
[facility] Hospitalist Progress Note    Admitting Date and Time: 2/20/2024 10:23 PM  Admit Dx: End stage renal disease (HCC) [N18.6]  Hyperkalemia [E87.5]  ESRD (end stage renal disease) (HCC) [N18.6]    Subjective:  Patient is being followed for End stage renal disease (HCC) [N18.6]  Hyperkalemia [E87.5]  ESRD (end stage renal disease) (HCC) [N18.6]     Patient was seen and examined  ROS: denies fever, chills, cp, sob, n/v, HA unless stated above.      insulin lispro  0-8 Units SubCUTAneous TID WC    insulin lispro  0-4 Units SubCUTAneous Nightly    sodium chloride flush  5-40 mL IntraVENous 2 times per day    famotidine  20 mg Oral Daily    aspirin  81 mg Oral QAM    atorvastatin  40 mg Oral QAM    calcium acetate  2 capsule Oral TID WC    heparin (porcine)  5,000 Units SubCUTAneous Q8H    budesonide  1,000 mcg Nebulization BID RT    ipratropium 0.5 mg-albuterol 2.5 mg  1 Dose Inhalation Q4H RT    methylPREDNISolone  40 mg IntraVENous Q6H     Glucose, 15 g, PRN  dextrose bolus, 125 mL, PRN   Or  dextrose bolus, 250 mL, PRN  glucagon (rDNA), 1 mg, PRN  dextrose, , Continuous PRN  sodium chloride flush, 5-40 mL, PRN  sodium chloride, , PRN  ondansetron, 4 mg, Q8H PRN   Or  ondansetron, 4 mg, Q6H PRN  polyethylene glycol, 17 g, Daily PRN  acetaminophen, 650 mg, Q6H PRN   Or  acetaminophen, 650 mg, Q6H PRN  midodrine, 10 mg, BID PRN  guaiFENesin-dextromethorphan, 5 mL, Q4H PRN  melatonin, 3 mg, Nightly PRN         Objective:    BP (!) 112/50   Pulse 55   Temp 97.5 °F (36.4 °C)   Resp 18   Ht 1.626 m (5' 4\")   Wt 109 kg (240 lb 4.8 oz)   SpO2 97%   BMI 41.25 kg/m²     General Appearance: alert and oriented to person, place and time and in no acute distress  Skin: warm and dry  Head: normocephalic and atraumatic  Eyes: pupils equal, round, and reactive to light, extraocular eye movements intact, conjunctivae normal  Neck: neck supple and non tender without mass   Pulmonary/Chest: clear to auscultation

## 2024-02-22 NOTE — CARE COORDINATION
Social Work:    Reviewed chart notes. Lizette was admitted due to a cough, congestion,and missed one O.P. hemodialysis treatment as the result of her symptoms. Social service met with Lizette, advised her about social service and  roles, as well as discussed discharge planning. Lizette is a patient of Dr Murrell. She resides with her daughter Meghann in a ranch home and uses a wheeled walker, wheelchair, and 02 ( [facility] liters[facility]. Lizette is active at [facility] [employer].  She has used [facility] and was pleased with care but presently does note feel she will need home care. Lizette has never used snf.  [facility] will need notified of discharge day [phone].    Electronically signed by HILDA Quiles on 2/22/2024 at 3:14 PM

## 2024-02-22 NOTE — PROGRESS NOTES
Patient refusing detailed admission skin assessment- refusing to take off socks, allow this RN to assess coccyx area. Pt did allow RN to assess scabbed abdomen and scabbed shins/calves BLE. Pt educated on importance of skin assessment. Patient verbalized understanding yet still refusing.

## 2024-02-22 NOTE — PLAN OF CARE
Problem: Safety - Adult  Goal: Free from fall injury  2/22/2024 1258 by Cyril Arcos RN  Outcome: Progressing  2/21/2024 2356 by ANH MARQUEZ  Outcome: Progressing  Flowsheets (Taken 2/21/2024 2356)  Free From Fall Injury: Instruct family/caregiver on patient safety     Problem: Chronic Conditions and Co-morbidities  Goal: Patient's chronic conditions and co-morbidity symptoms are monitored and maintained or improved  Outcome: Progressing

## 2024-02-22 NOTE — PROGRESS NOTES
[facility]  MD Sidney Amezcua, MD Sedrick Jones, MD Melissa Jones, CNP   Jaja Echavarria, ANP  Priya Hope, CNP  Progress Note    2/22/2024    SUBJECTIVE:   2/22: Seen while on dialysis. Tolerating therapy without issues or complaints. BP is stable. She denies any current dyspnea. She feels that her cough is improving.     PROBLEM LIST:    Principal Problem:    Hyperkalemia  Active Problems:    Anemia    Mediastinal lymphadenopathy  Resolved Problems:    * No resolved hospital problems. *         DIET:    ADULT DIET; Regular     MEDS (scheduled):    insulin lispro  0-8 Units SubCUTAneous TID WC    insulin lispro  0-4 Units SubCUTAneous Nightly    sodium chloride flush  5-40 mL IntraVENous 2 times per day    famotidine  20 mg Oral Daily    aspirin  81 mg Oral QAM    atorvastatin  40 mg Oral QAM    calcium acetate  2 capsule Oral TID WC    heparin (porcine)  5,000 Units SubCUTAneous Q8H    budesonide  1,000 mcg Nebulization BID RT    ipratropium 0.5 mg-albuterol 2.5 mg  1 Dose Inhalation Q4H RT    methylPREDNISolone  40 mg IntraVENous Q6H       MEDS (infusions):   dextrose      sodium chloride         MEDS (prn):  Glucose, dextrose bolus **OR** dextrose bolus, glucagon (rDNA), dextrose, sodium chloride flush, sodium chloride, ondansetron **OR** ondansetron, polyethylene glycol, acetaminophen **OR** acetaminophen, midodrine, guaiFENesin-dextromethorphan, melatonin    PHYSICAL EXAM:     Patient Vitals for the past 24 hrs:   BP Temp Temp src Pulse Resp SpO2 Height Weight   02/22/24 1230 115/70 -- -- 58 -- -- -- --   02/22/24 1200 (!) 110/54 -- -- 68 -- -- -- --   02/22/24 1130 (!) 104/55 -- -- 59 -- -- -- --   02/22/24 1100 (!) 158/61 -- -- 60 -- -- -- --   02/22/24 1030 131/63 -- -- 59 -- -- -- --   02/22/24 1000 136/63 -- -- 58 18 -- -- --   02/22/24 0930 138/71 -- -- 60 -- -- -- --   02/22/24 0900 (!) 147/76 -- -- 63 -- -- -- --   02/22/24 0840 (!) 116/53 97.2 °F (36.2 °C)

## 2024-02-22 NOTE — PROGRESS NOTES
SPIRITUAL HEALTH SERVICES - [facility]  PROGRESS NOTE    Name: Lizette Murray                Yazdanism: Lutheran   Anointed (Last Rites): No    Referral: Spiritual Care Consult    Assessment:  Upon entering the room  observes The patient is seated in a chair by the bedside and this  observes the patient to be resting and calm.      Intervention:  Engaged the patient in conversation remaining present through attentive listening offering words of support, encouragement, and comfort.    Outcome:  Patient easily engaged in the visit and is awaiting result from tests, and is accepting of whatever the outcome medically. Patient would also like a visit from [facility].    Plan:  Chaplains will remain available to offer spiritual and emotional support as needed.      Electronically signed by Chaplain Darwin, on 2/22/2024 at 3:45 PM.  Spiritual Care Department  [facility]  [phone]

## 2024-02-22 NOTE — PROGRESS NOTES
4 Eyes Skin Assessment     NAME:  Lizette Murray  YOB: 1955  MEDICAL RECORD NUMBER:  [id number]    The patient is being assessed for  Admission    I agree that at least one RN has performed a thorough Head to Toe Skin Assessment on the patient. ALL assessment sites listed below have been assessed.      Areas assessed by both nurses:    Head, Face, Ears, Shoulders, Back, Chest, Arms, Elbows, Hands, and Under Medical Devices Patient refusing to allow RN to look at coccyx and feet.        Does the Patient have a Wound? No noted wound(s)       Dm Prevention initiated by RN: No  Wound Care Orders initiated by RN: No    Pressure Injury (Stage 3,4, Unstageable, DTI, NWPT, and Complex wounds) if present, place Wound referral order by RN under : No    New Ostomies, if present place, Ostomy referral order under : No     Nurse 1 eSignature: Electronically signed by Mary Macdonald RN on 2/22/24 at 4:34 AM EST    **SHARE this note so that the co-signing nurse can place an eSignature**    Nurse 2 eSignature: {Esignature:[id number]}

## 2024-02-22 NOTE — PLAN OF CARE
Problem: Discharge Planning  Goal: Discharge to home or other facility with appropriate resources  Outcome: Progressing  Flowsheets (Taken 2/21/2024 2356)  Discharge to home or other facility with appropriate resources:   Identify barriers to discharge with patient and caregiver   Arrange for needed discharge resources and transportation as appropriate     Problem: Safety - Adult  Goal: Free from fall injury  Outcome: Progressing  Flowsheets (Taken 2/21/2024 2356)  Free From Fall Injury: Instruct family/caregiver on patient safety

## 2024-02-23 ENCOUNTER — APPOINTMENT (OUTPATIENT)
Age: 69
DRG: 640 | End: 2024-02-23
Attending: STUDENT IN AN ORGANIZED HEALTH CARE EDUCATION/TRAINING PROGRAM
Payer: MEDICARE

## 2024-02-23 VITALS
HEIGHT: 64 IN | WEIGHT: 256.62 LBS | OXYGEN SATURATION: 94 % | TEMPERATURE: 99.9 F | BODY MASS INDEX: 43.81 KG/M2 | SYSTOLIC BLOOD PRESSURE: 106 MMHG | DIASTOLIC BLOOD PRESSURE: 90 MMHG | HEART RATE: 83 BPM | RESPIRATION RATE: 18 BRPM

## 2024-02-23 LAB
ALBUMIN SERPL-MCNC: 3.3 G/DL (ref 3.5–4.7)
ALBUMIN SERPL-MCNC: 3.8 G/DL (ref 3.5–5.2)
ALPHA1 GLOB SERPL ELPH-MCNC: 0.3 G/DL (ref 0.2–0.4)
ALPHA2 GLOB SERPL ELPH-MCNC: 1 G/DL (ref 0.5–1)
ANION GAP SERPL CALCULATED.3IONS-SCNC: 16 MMOL/L (ref 7–16)
B-GLOBULIN SERPL ELPH-MCNC: 1 G/DL (ref 0.8–1.3)
BASOPHILS # BLD: 0 K/UL (ref 0–0.2)
BASOPHILS NFR BLD: 0 % (ref 0–2)
BUN SERPL-MCNC: 33 MG/DL (ref 6–23)
CALCIUM SERPL-MCNC: 8.9 MG/DL (ref 8.6–10.2)
CHLORIDE SERPL-SCNC: 93 MMOL/L (ref 98–107)
CO2 SERPL-SCNC: 26 MMOL/L (ref 22–29)
CREAT SERPL-MCNC: 4.1 MG/DL (ref 0.5–1)
EOSINOPHIL # BLD: 0 K/UL (ref 0.05–0.5)
EOSINOPHILS RELATIVE PERCENT: 0 % (ref 0–6)
ERYTHROCYTE [DISTWIDTH] IN BLOOD BY AUTOMATED COUNT: 14.2 % (ref 11.5–15)
GAMMA GLOB SERPL ELPH-MCNC: 1.1 G/DL (ref 0.7–1.6)
GFR SERPL CREATININE-BSD FRML MDRD: 11 ML/MIN/1.73M2
GLUCOSE BLD-MCNC: 187 MG/DL (ref 74–99)
GLUCOSE BLD-MCNC: 328 MG/DL (ref 74–99)
GLUCOSE SERPL-MCNC: 317 MG/DL (ref 74–99)
HCT VFR BLD AUTO: 31.9 % (ref 34–48)
HGB BLD-MCNC: 9.9 G/DL (ref 11.5–15.5)
LYMPHOCYTES NFR BLD: 0.08 K/UL (ref 1.5–4)
LYMPHOCYTES RELATIVE PERCENT: 1 % (ref 20–42)
MAGNESIUM SERPL-MCNC: 2.1 MG/DL (ref 1.6–2.6)
MCH RBC QN AUTO: 32.2 PG (ref 26–35)
MCHC RBC AUTO-ENTMCNC: 31 G/DL (ref 32–34.5)
MCV RBC AUTO: 103.9 FL (ref 80–99.9)
MONOCYTES NFR BLD: 0.25 K/UL (ref 0.1–0.95)
MONOCYTES NFR BLD: 3 % (ref 2–12)
NEUTROPHILS NFR BLD: 97 % (ref 43–80)
NEUTS SEG NFR BLD: 9.17 K/UL (ref 1.8–7.3)
PATHOLOGIST: ABNORMAL
PHOSPHATE SERPL-MCNC: 4.1 MG/DL (ref 2.5–4.5)
PLATELET, FLUORESCENCE: 142 K/UL (ref 130–450)
PMV BLD AUTO: 11 FL (ref 7–12)
POTASSIUM SERPL-SCNC: 4.4 MMOL/L (ref 3.5–5)
PROT PATTERN SERPL ELPH-IMP: ABNORMAL
PROT SERPL-MCNC: 6.7 G/DL (ref 6.4–8.3)
RBC # BLD AUTO: 3.07 M/UL (ref 3.5–5.5)
RBC # BLD: ABNORMAL 10*6/UL
RBC # BLD: ABNORMAL 10*6/UL
SODIUM SERPL-SCNC: 135 MMOL/L (ref 132–146)
WBC OTHER # BLD: 9.5 K/UL (ref 4.5–11.5)

## 2024-02-23 PROCEDURE — 6370000000 HC RX 637 (ALT 250 FOR IP): Performed by: STUDENT IN AN ORGANIZED HEALTH CARE EDUCATION/TRAINING PROGRAM

## 2024-02-23 PROCEDURE — 2500000003 HC RX 250 WO HCPCS: Performed by: STUDENT IN AN ORGANIZED HEALTH CARE EDUCATION/TRAINING PROGRAM

## 2024-02-23 PROCEDURE — 2060000000 HC ICU INTERMEDIATE R&B

## 2024-02-23 PROCEDURE — 99232 SBSQ HOSP IP/OBS MODERATE 35: CPT | Performed by: CLINICAL NURSE SPECIALIST

## 2024-02-23 PROCEDURE — 6370000000 HC RX 637 (ALT 250 FOR IP): Performed by: INTERNAL MEDICINE

## 2024-02-23 PROCEDURE — 94640 AIRWAY INHALATION TREATMENT: CPT

## 2024-02-23 PROCEDURE — 83735 ASSAY OF MAGNESIUM: CPT

## 2024-02-23 PROCEDURE — 80069 RENAL FUNCTION PANEL: CPT

## 2024-02-23 PROCEDURE — 36415 COLL VENOUS BLD VENIPUNCTURE: CPT

## 2024-02-23 PROCEDURE — 99239 HOSP IP/OBS DSCHRG MGMT >30: CPT | Performed by: STUDENT IN AN ORGANIZED HEALTH CARE EDUCATION/TRAINING PROGRAM

## 2024-02-23 PROCEDURE — 96372 THER/PROPH/DIAG INJ SC/IM: CPT

## 2024-02-23 PROCEDURE — 6370000000 HC RX 637 (ALT 250 FOR IP)

## 2024-02-23 PROCEDURE — 82962 GLUCOSE BLOOD TEST: CPT

## 2024-02-23 PROCEDURE — 6360000002 HC RX W HCPCS: Performed by: INTERNAL MEDICINE

## 2024-02-23 PROCEDURE — 90935 HEMODIALYSIS ONE EVALUATION: CPT

## 2024-02-23 PROCEDURE — 85025 COMPLETE CBC W/AUTO DIFF WBC: CPT

## 2024-02-23 RX ORDER — PREDNISONE 20 MG/1
40 TABLET ORAL DAILY
Qty: 10 TABLET | Refills: 0 | Status: SHIPPED | OUTPATIENT
Start: 2024-02-23 | End: 2024-02-28

## 2024-02-23 RX ORDER — CALCIUM CARBONATE 500 MG/1
500 TABLET, CHEWABLE ORAL 3 TIMES DAILY
Status: DISCONTINUED | OUTPATIENT
Start: 2024-02-23 | End: 2024-02-23 | Stop reason: HOSPADM

## 2024-02-23 RX ADMIN — BUDESONIDE 1000 MCG: 0.5 SUSPENSION RESPIRATORY (INHALATION) at 10:21

## 2024-02-23 RX ADMIN — ACETAMINOPHEN 650 MG: 325 TABLET ORAL at 11:03

## 2024-02-23 RX ADMIN — IPRATROPIUM BROMIDE AND ALBUTEROL SULFATE 1 DOSE: 2.5; .5 SOLUTION RESPIRATORY (INHALATION) at 10:22

## 2024-02-23 RX ADMIN — CALCIUM ACETATE 1334 MG: 667 CAPSULE ORAL at 06:22

## 2024-02-23 RX ADMIN — HEPARIN SODIUM 5000 UNITS: 10000 INJECTION INTRAVENOUS; SUBCUTANEOUS at 04:40

## 2024-02-23 RX ADMIN — CALCIUM CARBONATE 500 MG: 500 TABLET, CHEWABLE ORAL at 12:38

## 2024-02-23 RX ADMIN — IPRATROPIUM BROMIDE AND ALBUTEROL SULFATE 1 DOSE: 2.5; .5 SOLUTION RESPIRATORY (INHALATION) at 13:03

## 2024-02-23 RX ADMIN — INSULIN LISPRO 6 UNITS: 100 INJECTION, SOLUTION INTRAVENOUS; SUBCUTANEOUS at 06:22

## 2024-02-23 RX ADMIN — GUAIFENESIN 400 MG: 400 TABLET ORAL at 04:47

## 2024-02-23 RX ADMIN — CALCIUM ACETATE 1334 MG: 667 CAPSULE ORAL at 11:04

## 2024-02-23 ASSESSMENT — PAIN SCALES - GENERAL
PAINLEVEL_OUTOF10: 0
PAINLEVEL_OUTOF10: 0

## 2024-02-23 NOTE — PROGRESS NOTES
Pulmonary Progress Note    Admit Date: 2/20/2024  Hospital day                               PCP: Lourdes Murrell MD    Chief Complaint (s):  Patient Active Problem List   Diagnosis    Nausea & vomiting    Sepsis without acute organ dysfunction (HCC)    Essential hypertension    Hyperlipidemia    Diabetes mellitus type 2 with complications (HCC)    Neck pain    Anemia    COVID-19    Mild pulmonary hypertension (HCC)    Obesity, Class III, BMI 40-49.9 (morbid obesity) (HCC)    History of Clostridioides difficile colitis    Anxiety and depression    At high risk for falls    Dizziness on standing    Acute cough    Acute pulmonary embolism (HCC)    H/O heart artery stent    Pulmonary embolism and infarction (HCC)    Diabetes mellitus (HCC)    Hematemesis    ESRD on hemodialysis (HCC)    Leg swelling    Lymphedema of both lower extremities    Rectal bleeding    BRBPR (bright red blood per rectum)    Acute on chronic heart failure with preserved ejection fraction (HCC)    Aortic stenosis, mild    Anemia due to stage 5 chronic kidney disease (HCC)    History of pulmonary embolus (PE)    Presence of inferior vena cava filter    Hyperkalemia    Pneumonia due to organism    Gallstones    Coagulation defect (HCC)    Acute on chronic respiratory failure (HCC)    Dyspnea    Fluid overload    Macrocytosis    Chronic anemia    End stage renal disease (HCC)    Mediastinal lymphadenopathy       Subjective:  Attempted to see the patient both at the bedside as well as dialysis.  The patient has been transported already to CAT scan but she was unavailable for exam.      Vitals:  VITALS:  BP (!) 106/90   Pulse 77   Temp 100.4 °F (38 °C) (Oral)   Resp 16   Ht 1.626 m (5' 4\")   Wt 116.4 kg (256 lb 9.9 oz)   SpO2 94%   BMI 44.05 kg/m²     24HR INTAKE/OUTPUT:      Intake/Output Summary (Last 24 hours) at 2/23/2024 1139  Last data filed at 2/23/2024 0945  Gross per 24 hour   Intake 600 ml   Output 5000 ml   Net -4400    BILITOT 0.2   ALKPHOS 91       PT/INR: No results for input(s): \"PROTIME\", \"INR\" in the last 72 hours.  APTT: No results for input(s): \"APTT\" in the last 72 hours.      Pathology:  N/A      Microbiology:  None    Recent ABG:   No results for input(s): \"PH\", \"PO2\", \"PCO2\", \"HCO3\", \"BE\", \"O2SAT\", \"METHB\", \"O2HB\", \"COHB\", \"O2CON\", \"HHB\", \"THB\" in the last 72 hours.          Recent Films:  CT CHEST WO CONTRAST   Final Result   1.  Residual atelectasis and peripheral subpleural scar seen in both lungs as   above commented the.      2.  Stable multi compartment mediastinal and right hilar adenopathy since the   study of January 2022.      3.  No indication for random distribution of a pulmonary nodules Kenisha   lymphatic lymph nodes along the pleural fissures and peribronchial   bronchocentric interstitium.      5.  Presently, mediastinal adenopathy will be the only compatible   manifestation for sarcoidosis.  There is no indication for sarcoidosis in the   lung presently.         CT ABDOMEN PELVIS W IV CONTRAST Additional Contrast? None   Final Result   1. No acute intra-abdominal or pelvic abnormality.   2. Cholelithiasis.   3. Diverticulosis.   4. Moderate fat containing periumbilical hernia.         XR CHEST PORTABLE   Final Result   No acute process.      Cardiomegaly.                      Assessment:  Cough: Likely reactive and secondary to infection.  Adenopathy: Likely reactive  CKD 5  History of pulmonary embolism        Plan:  DVT prophylaxis  Bronchodilators  Check sputum  Short course of steroids  Further recommendations to follow      Time at the bedside, reviewing labs and radiographs, reviewing updated notes and consultations, discussing with staff and family was more than 35 minutes.    Please note that voice recognition technology was used in the preparation of this note and make therefore it may contain inadvertent transcription errors.  If the patient is a COVID 19 isolation patient, the above physical

## 2024-02-23 NOTE — FLOWSHEET NOTE
Pt completed 2 hr UF with 1.5L removed.   02/23/24 0945   Vital Signs   BP (!) 106/90   Temp 98 °F (36.7 °C)   Pulse 77   Respirations 16   Weight - Scale 116.4 kg (256 lb 9.9 oz)   Weight Method Bed scale   Percent Weight Change -1.27   Pain Assessment   Pain Assessment None - Denies Pain   Pain Level 0   Post-Hemodialysis Assessment   Post-Treatment Procedures Blood returned;Access bleeding time < 10 minutes   Machine Disinfection Process Acid/Vinegar Clean;Exterior Machine Disinfection   Rinseback Volume (ml) 300 ml   Dialyzer Clearance Clear   Duration of Treatment (minutes) 120 minutes   Hemodialysis Intake (ml) 300 ml   Hemodialysis Output (ml) 1800 ml   NET Removed (ml) 1500   Tolerated Treatment Good   Patient Response to Treatment Tolerated 2 hr UF with 1.5L removal; blood returned; needles pulled; stasis achieved; post report to Phylicia KIM   Bilateral Breath Sounds Diminished   Time Off 0930   Patient Disposition Return to room   Observations & Evaluations   Level of Consciousness 0   Oriented X 3   Heart Rhythm Regular   Respiratory Quality/Effort Unlabored   O2 Device Nasal cannula   Skin Color Pink   Skin Condition/Temp Dry;Warm   Abdomen Inspection Soft   Bowel Sounds (All Quadrants) Active

## 2024-02-23 NOTE — PROGRESS NOTES
Department of [facility] Med Oncology  Consult Follow Up Note    SUBJECTIVE:  States not feeling that well today , fever 100.4 , feeling constipated and SOB and cough     OBJECTIVE:   Patient is reclining in her chair she reports increased SOB and cough improved with breathing tx, overall not feeling as well as she had yesterday.       General Appearance: NAD, A&O x3  Skin: warm and dry  Head: normocephalic and atraumatic  Eyes: pupils equal, round  Pulmonary/Chest: clear to auscultation bilaterally- , no respiratory distress  Cardiovascular: normal rate, normal S1 and S2 and no carotid bruits  Abdomen: soft, non-tender, non-distended, normal bowel sounds, no masses or organomegaly  Extremities: no cyanosis, no clubbing and no edema  Neurologic: no cranial nerve deficit and speech normal  Current Medications:   calcium carbonate (TUMS) chewable tablet 500 mg, TID  insulin lispro (HUMALOG) injection vial 0-8 Units, TID WC  insulin lispro (HUMALOG) injection vial 0-4 Units, Nightly  Glucose (TRUEPLUS) oral gel 15 g, PRN  guaiFENesin tablet 400 mg, BID PRN  dextrose bolus 10% 125 mL, PRN   Or  dextrose bolus 10% 250 mL, PRN  glucagon injection 1 mg, PRN  dextrose 10 % infusion, Continuous PRN  sodium chloride flush 0.9 % injection 5-40 mL, 2 times per day  sodium chloride flush 0.9 % injection 5-40 mL, PRN  0.9 % sodium chloride infusion, PRN  ondansetron (ZOFRAN-ODT) disintegrating tablet 4 mg, Q8H PRN   Or  ondansetron (ZOFRAN) injection 4 mg, Q6H PRN  polyethylene glycol (GLYCOLAX) packet 17 g, Daily PRN  acetaminophen (TYLENOL) tablet 650 mg, Q6H PRN   Or  acetaminophen (TYLENOL) suppository 650 mg, Q6H PRN  famotidine (PEPCID) tablet 20 mg, Daily  aspirin chewable tablet 81 mg, QAM  atorvastatin (LIPITOR) tablet 40 mg, QAM  calcium acetate (PHOSLO) capsule 1,334 mg, TID WC  midodrine (PROAMATINE) tablet 10 mg, BID PRN  heparin (porcine) injection 5,000 Units, Q8H  budesonide (PULMICORT) nebulizer suspension 1,000 mcg,

## 2024-02-23 NOTE — PLAN OF CARE
Problem: Discharge Planning  Goal: Discharge to home or other facility with appropriate resources  Outcome: Progressing  Flowsheets (Taken 2/21/2024 2356)  Discharge to home or other facility with appropriate resources:   Identify barriers to discharge with patient and caregiver   Arrange for needed discharge resources and transportation as appropriate     Problem: Safety - Adult  Goal: Free from fall injury  2/22/2024 2345 by ANH MARQUEZ  Outcome: Progressing  Flowsheets (Taken 2/21/2024 2356)  Free From Fall Injury: Instruct family/caregiver on patient safety  2/22/2024 1258 by Cyril Arcos, RN  Outcome: Progressing     Problem: Chronic Conditions and Co-morbidities  Goal: Patient's chronic conditions and co-morbidity symptoms are monitored and maintained or improved  2/22/2024 2345 by ANH MARQUEZ  Outcome: Progressing  Flowsheets (Taken 2/22/2024 2345)  Care Plan - Patient's Chronic Conditions and Co-Morbidity Symptoms are Monitored and Maintained or Improved: Monitor and assess patient's chronic conditions and comorbid symptoms for stability, deterioration, or improvement  2/22/2024 1258 by Cyril Arcos, RN  Outcome: Progressing

## 2024-02-23 NOTE — DISCHARGE SUMMARY
[facility] Hospitalist Physician Discharge Summary       No follow-up provider specified.    Activity level: As tolerated     Dispo: Home       Condition on discharge: Stable     Patient ID:  Lizette Murray  [id number]  68 y.o.  1955    Admit date: 2/20/2024    Discharge date and time:  2/23/2024  12:07 PM    Admission Diagnoses: Principal Problem:    Hyperkalemia  Active Problems:    Anemia    Mediastinal lymphadenopathy  Resolved Problems:    * No resolved hospital problems. *      Discharge Diagnoses: Principal Problem:    Hyperkalemia  Active Problems:    Anemia    Mediastinal lymphadenopathy  Resolved Problems:    * No resolved hospital problems. *      Consults:  IP CONSULT TO NEPHROLOGY  IP CONSULT TO ONCOLOGY  IP CONSULT TO PULMONOLOGY  IP CONSULT TO SPIRITUAL SERVICES    Procedures:     Hospital Course:     68-year-old female admitted for missed dialysis secondary to dry cough patient is not currently taking ACE inhibitor or chronic smoker.  She slowly recovered from COVID-19 pneumonia about 1 month ago, and also recently have a viral upper respiratory tract infection.  On the CT chest, she was found to have mediastinal lymphadenopathy, pulmonology and also oncology consulted.  ESRD patient, nephrology consulted for dialysis she received her dialysis session while she is in-house patient received systemic steroids nebulizer treatment, symptoms improving.  Oncology suggested outpatient follow-up for a biopsy to rule out lymphoma.  Patient medical stable discharge    Discharge Exam:    General Appearance: alert and oriented to person, place and time and in no acute distress  Skin: warm and dry  Head: normocephalic and atraumatic  Eyes: pupils equal, round, and reactive to light, extraocular eye movements intact, conjunctivae normal  Neck: neck supple and non tender without mass   Pulmonary/Chest: clear to auscultation bilaterally- no wheezes, rales or rhonchi, normal air movement, no respiratory

## 2024-02-23 NOTE — PROGRESS NOTES
SPIRITUAL HEALTH SERVICES - [facility] Encounter    Name: Lizette Murray                  Referral: Routine Visit    Sacraments  Anointed (Last Rites): Yes  Apostolic [place]: No  Confession: No  Communion: Yes     Assessment:  Patient receptive to  visit.      Intervention:   provided spiritual support and sacramental ministry for patient.     Outcome:  Patient voiced being spiritually uplifted by the Sacraments and expressed appreciation for the visit.    Plan:  Chaplains will remain available to offer spiritual and emotional support as needed.      Electronically signed by Chaplain Mildred, on 2/23/2024 at 12:52 PM.  Spiritual Care Department  [facility]  [phone]

## 2024-02-23 NOTE — PROGRESS NOTES
[facility]  MD Sidney Amezcua, MD Melissa Morel, CNP   Jaja Echavarria, YOSHI Hope, CASH  Progress Note    2/23/2024    SUBJECTIVE:   2/22: Seen while on dialysis. Tolerating therapy without issues or complaints. BP is stable. She denies any current dyspnea. She feels that her cough is improving.     2/23: Seen while on dialysis. She is anxious on treatment due to worsening cough today. Feels mildly short of breath. Oxygen saturation is normal. Blood pressure is stable.    PROBLEM LIST:    Principal Problem:    Hyperkalemia  Active Problems:    Anemia    Mediastinal lymphadenopathy  Resolved Problems:    * No resolved hospital problems. *         DIET:    ADULT DIET; Regular; Low Potassium (Less than 3000 mg/day)     MEDS (scheduled):    insulin lispro  0-8 Units SubCUTAneous TID WC    insulin lispro  0-4 Units SubCUTAneous Nightly    sodium chloride flush  5-40 mL IntraVENous 2 times per day    famotidine  20 mg Oral Daily    aspirin  81 mg Oral QAM    atorvastatin  40 mg Oral QAM    calcium acetate  2 capsule Oral TID WC    heparin (porcine)  5,000 Units SubCUTAneous Q8H    budesonide  1,000 mcg Nebulization BID RT    ipratropium 0.5 mg-albuterol 2.5 mg  1 Dose Inhalation Q4H RT       MEDS (infusions):   dextrose      sodium chloride         MEDS (prn):  Glucose, guaiFENesin, dextrose bolus **OR** dextrose bolus, glucagon (rDNA), dextrose, sodium chloride flush, sodium chloride, ondansetron **OR** ondansetron, polyethylene glycol, acetaminophen **OR** acetaminophen, midodrine, guaiFENesin-dextromethorphan, melatonin    PHYSICAL EXAM:     Patient Vitals for the past 24 hrs:   BP Temp Temp src Pulse Resp SpO2 Weight   02/23/24 0945 (!) 106/90 98 °F (36.7 °C) -- 77 16 -- 116.4 kg (256 lb 9.9 oz)   02/23/24 0900 (!) 125/103 -- -- 74 -- -- --   02/23/24 0830 124/67 -- -- 72 -- -- --   02/23/24 0800 (!) 162/72 -- -- 81 -- -- --   02/23/24 0715 (!) 142/80

## 2024-02-25 LAB — ZINC SERPL-MCNC: 62.6 UG/DL (ref 60–120)

## 2024-02-28 NOTE — PROGRESS NOTES
CLINICAL PHARMACY NOTE: MEDS TO BEDS    Total # of Prescriptions Filled: 1   The following medications were delivered to the patient:  Prednisone 20 mg    Additional Documentation:

## 2024-03-14 ENCOUNTER — COMMUNITY OUTREACH (OUTPATIENT)
Dept: PRIMARY CARE CLINIC | Age: 69
End: 2024-03-14

## 2024-03-14 NOTE — PROGRESS NOTES
Patient's HM shows they are overdue for Mammogram.   [place] and  files searched without success.

## 2024-03-15 RX ORDER — BLOOD-GLUCOSE METER
KIT MISCELLANEOUS
Qty: 100 STRIP | Refills: 4 | Status: SHIPPED | OUTPATIENT
Start: 2024-03-15

## 2024-03-22 ENCOUNTER — HOSPITAL ENCOUNTER (OUTPATIENT)
Dept: INFUSION THERAPY | Age: 69
Discharge: HOME | End: 2024-03-22

## 2024-03-22 ENCOUNTER — OFFICE VISIT (OUTPATIENT)
Dept: ONCOLOGY | Age: 69
End: 2024-03-22
Payer: MEDICARE

## 2024-03-22 VITALS
HEIGHT: 64 IN | BODY MASS INDEX: 43.02 KG/M2 | TEMPERATURE: 98 F | SYSTOLIC BLOOD PRESSURE: 133 MMHG | HEART RATE: 70 BPM | WEIGHT: 252 LBS | DIASTOLIC BLOOD PRESSURE: 62 MMHG | OXYGEN SATURATION: 95 %

## 2024-03-22 DIAGNOSIS — R59.0 MEDIASTINAL LYMPHADENOPATHY: Primary | ICD-10-CM

## 2024-03-22 DIAGNOSIS — Z86.2 HISTORY OF ANEMIA DUE TO CKD: ICD-10-CM

## 2024-03-22 DIAGNOSIS — N18.9 HISTORY OF ANEMIA DUE TO CKD: ICD-10-CM

## 2024-03-22 PROCEDURE — 99205 OFFICE O/P NEW HI 60 MIN: CPT | Performed by: INTERNAL MEDICINE

## 2024-03-22 PROCEDURE — G8427 DOCREV CUR MEDS BY ELIG CLIN: HCPCS | Performed by: INTERNAL MEDICINE

## 2024-03-22 PROCEDURE — 3017F COLORECTAL CA SCREEN DOC REV: CPT | Performed by: INTERNAL MEDICINE

## 2024-03-22 PROCEDURE — G8400 PT W/DXA NO RESULTS DOC: HCPCS | Performed by: INTERNAL MEDICINE

## 2024-03-22 PROCEDURE — G8417 CALC BMI ABV UP PARAM F/U: HCPCS | Performed by: INTERNAL MEDICINE

## 2024-03-22 PROCEDURE — 1036F TOBACCO NON-USER: CPT | Performed by: INTERNAL MEDICINE

## 2024-03-22 PROCEDURE — 1111F DSCHRG MED/CURRENT MED MERGE: CPT | Performed by: INTERNAL MEDICINE

## 2024-03-22 PROCEDURE — 1123F ACP DISCUSS/DSCN MKR DOCD: CPT | Performed by: INTERNAL MEDICINE

## 2024-03-22 PROCEDURE — G8484 FLU IMMUNIZE NO ADMIN: HCPCS | Performed by: INTERNAL MEDICINE

## 2024-03-22 PROCEDURE — 3078F DIAST BP <80 MM HG: CPT | Performed by: INTERNAL MEDICINE

## 2024-03-22 PROCEDURE — 3075F SYST BP GE 130 - 139MM HG: CPT | Performed by: INTERNAL MEDICINE

## 2024-03-22 PROCEDURE — 1090F PRES/ABSN URINE INCON ASSESS: CPT | Performed by: INTERNAL MEDICINE

## 2024-03-22 SDOH — ECONOMIC STABILITY: HOUSING INSECURITY: PLEASE ASSESS YOUR PATIENT'S LEVEL OF DISTRESS CONCERNING HOUSING (SCALE FROM 1-10): 0

## 2024-03-22 NOTE — PROGRESS NOTES
[facility] PHYSICIANS [facility] MED ONCOLOGY  [address] [facility] [address]  Dept: [phone]  Loc: [phone]  Attending Consult Note      Reason for Visit:   Mediastinal lymphadenopathy.    Referring Physician:  TOMY STONE      PCP:  Lourdes Murrell MD    History of Present Illness:    Ms Murray is a 68-year-old female with past medical history significant for end-stage renal disease on HD, type II DM, hypertension, anemia of CKD, COPD and depression/anxiety who presented to the ED on 2/20/2024 with complaints of shortness of breath and lower abdominal pain. Associated with of persistent nonproductive cough cough, nausea and vomiting along with diarrhea.  Patient states she has postnasal drip that has been chronic. Patient missed dialysis yesterday due to flulike symptoms.      Laboratory workup was pertinent for hyperkalemia K5.8 with elevated BUN and creatinine in keeping with history of ESRD, proBNP elevated at 51 K and troponin 69, WBC normal at 6.0 with no immature cells, hemoglobin 10.3 and hematocrit 33.2, .  Respiratory viral panel was negative.  From 2/17/2024 chest CTA  was concerning for interval increase in size of mediastinal lymph nodes and a 1.5 cm subcarinal lymph node, chest CT is currently pending. Medical oncology was consulted for further evaluation of mediastinal lymph nodes given short interval change.  CT scan of the chest was done during the hospitalization, revealing stable multicompartment mediastinal and right hilar adenopathy since the study of January 2022.    The patient is feeling much better, the cough has almost resolved, the shortness of breath had improved, she was on oxygen 4l/min, now down to 2.  She is accompanied by her daughter.    Review of Systems;  CONSTITUTIONAL: No fever, chills. Good appetite, positive for fatigue.  ENMT: Eyes: No diplopia; Nose: No epistaxis. Mouth: No sore throat.  RESPIRATORY: As per

## 2024-03-22 NOTE — PROGRESS NOTES
Lizette Murray  1955 68 y.o.      Referring Physician:     PCP: Lourdes Murrell MD    Vitals:    24 1347   BP: 133/62   Pulse: 70   Temp: 98 °F (36.7 °C)   SpO2: 95%        Wt Readings from Last 3 Encounters:   24 114.3 kg (252 lb)   24 116.4 kg (256 lb 9.9 oz)   24 112 kg (246 lb 14.6 oz)        Body mass index is 43.26 kg/m².          Chief Complaint:   Chief Complaint   Patient presents with    New Patient    Lymphadenopathy           LMP: age 61    Age at first Menses: 13    : 3    Para: 2          Current Outpatient Medications:     blood glucose test strips (FREESTYLE LITE) strip, USE TO TEST BLOOD GLUCOSE 4 TIMES DAILY AFTER EACH MEAL AND AT BEDTIME, Disp: 100 strip, Rfl: 4    midodrine (PROAMATINE) 10 MG tablet, Take 1 tablet by mouth 2 times daily as needed (FOR SBP<105 @ DIALYSIS (TUE-THUR-SAT)), Disp: , Rfl:     insulin glargine (BASAGLAR KWIKPEN) 100 UNIT/ML injection pen, Inject 15 Units into the skin every morning, Disp: , Rfl:     atorvastatin (LIPITOR) 40 MG tablet, Take 1 tablet by mouth every morning, Disp: 90 tablet, Rfl: 1    aspirin 81 MG chewable tablet, Take 1 tablet by mouth every morning, Disp: , Rfl:     NOVOLOG FLEXPEN 100 UNIT/ML injection pen, Inject 15 Units into the skin 3 times daily (before meals) PLUS SLIDING SCALE: EVERY 50 UNITS ADD 1 UNIT *IF SKIPPING MEAL, ONLY TAKE SS DOSE*, Disp: , Rfl:     OXYGEN, Inhale 2 L/min into the lungs continuous, Disp: , Rfl:     acetaminophen (TYLENOL) 650 MG extended release tablet, Take 2 tablets by mouth every 8 hours as needed for Pain, Disp: , Rfl:     calcium acetate (PHOSLO) 667 MG CAPS capsule, Take 2 capsules by mouth 3 times daily (with meals), Disp: , Rfl:        Past Medical History:   Diagnosis Date    Acute on chronic heart failure with preserved ejection fraction (HCC) 2023    Acute pulmonary embolism (HCC) 2022    Anemia due to stage 5 chronic kidney disease, not on chronic dialysis

## 2024-03-23 PROBLEM — D68.9 COAGULATION DEFECT (HCC): Status: RESOLVED | Noted: 2023-08-10 | Resolved: 2024-03-23

## 2024-04-10 ENCOUNTER — OFFICE VISIT (OUTPATIENT)
Dept: PRIMARY CARE CLINIC | Age: 69
End: 2024-04-10
Payer: MEDICARE

## 2024-04-10 VITALS
HEART RATE: 48 BPM | DIASTOLIC BLOOD PRESSURE: 70 MMHG | TEMPERATURE: 97 F | WEIGHT: 251 LBS | BODY MASS INDEX: 42.85 KG/M2 | OXYGEN SATURATION: 95 % | HEIGHT: 64 IN | SYSTOLIC BLOOD PRESSURE: 118 MMHG | RESPIRATION RATE: 20 BRPM

## 2024-04-10 DIAGNOSIS — I10 PRIMARY HYPERTENSION: ICD-10-CM

## 2024-04-10 DIAGNOSIS — R00.1 BRADYCARDIA: ICD-10-CM

## 2024-04-10 DIAGNOSIS — Z86.2 HISTORY OF ANEMIA DUE TO CHRONIC KIDNEY DISEASE: ICD-10-CM

## 2024-04-10 DIAGNOSIS — N18.6 ESRD ON HEMODIALYSIS (HCC): ICD-10-CM

## 2024-04-10 DIAGNOSIS — Z79.4 TYPE 2 DIABETES MELLITUS WITH STAGE 5 CHRONIC KIDNEY DISEASE NOT ON CHRONIC DIALYSIS, WITH LONG-TERM CURRENT USE OF INSULIN (HCC): ICD-10-CM

## 2024-04-10 DIAGNOSIS — D36.0 BENIGN NEOPLASM OF MEDIASTINAL LYMPH NODE: ICD-10-CM

## 2024-04-10 DIAGNOSIS — N18.5 TYPE 2 DIABETES MELLITUS WITH STAGE 5 CHRONIC KIDNEY DISEASE NOT ON CHRONIC DIALYSIS, WITH LONG-TERM CURRENT USE OF INSULIN (HCC): ICD-10-CM

## 2024-04-10 DIAGNOSIS — E78.2 MIXED HYPERLIPIDEMIA: ICD-10-CM

## 2024-04-10 DIAGNOSIS — I25.118 CORONARY ARTERY DISEASE OF NATIVE ARTERY OF NATIVE HEART WITH STABLE ANGINA PECTORIS (HCC): ICD-10-CM

## 2024-04-10 DIAGNOSIS — Z00.00 MEDICARE ANNUAL WELLNESS VISIT, SUBSEQUENT: Primary | ICD-10-CM

## 2024-04-10 DIAGNOSIS — E11.22 TYPE 2 DIABETES MELLITUS WITH STAGE 5 CHRONIC KIDNEY DISEASE NOT ON CHRONIC DIALYSIS, WITH LONG-TERM CURRENT USE OF INSULIN (HCC): ICD-10-CM

## 2024-04-10 DIAGNOSIS — Z99.2 ESRD ON HEMODIALYSIS (HCC): ICD-10-CM

## 2024-04-10 DIAGNOSIS — I87.2 VENOUS INSUFFICIENCY: ICD-10-CM

## 2024-04-10 DIAGNOSIS — E11.8 DIABETES MELLITUS TYPE 2 WITH COMPLICATIONS (HCC): ICD-10-CM

## 2024-04-10 DIAGNOSIS — E66.01 OBESITY, CLASS III, BMI 40-49.9 (MORBID OBESITY) (HCC): ICD-10-CM

## 2024-04-10 DIAGNOSIS — N18.9 HISTORY OF ANEMIA DUE TO CHRONIC KIDNEY DISEASE: ICD-10-CM

## 2024-04-10 DIAGNOSIS — J96.01 ACUTE RESPIRATORY FAILURE WITH HYPOXIA (HCC): ICD-10-CM

## 2024-04-10 PROBLEM — I50.33 ACUTE ON CHRONIC HEART FAILURE WITH PRESERVED EJECTION FRACTION (HCC): Status: RESOLVED | Noted: 2023-02-07 | Resolved: 2024-04-10

## 2024-04-10 PROBLEM — I26.99 PULMONARY EMBOLISM AND INFARCTION (HCC): Status: RESOLVED | Noted: 2022-12-03 | Resolved: 2024-04-10

## 2024-04-10 PROBLEM — I26.99 ACUTE PULMONARY EMBOLISM (HCC): Status: RESOLVED | Noted: 2022-12-03 | Resolved: 2024-04-10

## 2024-04-10 PROCEDURE — 3017F COLORECTAL CA SCREEN DOC REV: CPT | Performed by: INTERNAL MEDICINE

## 2024-04-10 PROCEDURE — 2022F DILAT RTA XM EVC RTNOPTHY: CPT | Performed by: INTERNAL MEDICINE

## 2024-04-10 PROCEDURE — 99214 OFFICE O/P EST MOD 30 MIN: CPT | Performed by: INTERNAL MEDICINE

## 2024-04-10 PROCEDURE — 1123F ACP DISCUSS/DSCN MKR DOCD: CPT | Performed by: INTERNAL MEDICINE

## 2024-04-10 PROCEDURE — G8427 DOCREV CUR MEDS BY ELIG CLIN: HCPCS | Performed by: INTERNAL MEDICINE

## 2024-04-10 PROCEDURE — G8417 CALC BMI ABV UP PARAM F/U: HCPCS | Performed by: INTERNAL MEDICINE

## 2024-04-10 PROCEDURE — G8400 PT W/DXA NO RESULTS DOC: HCPCS | Performed by: INTERNAL MEDICINE

## 2024-04-10 PROCEDURE — 1090F PRES/ABSN URINE INCON ASSESS: CPT | Performed by: INTERNAL MEDICINE

## 2024-04-10 PROCEDURE — G0439 PPPS, SUBSEQ VISIT: HCPCS | Performed by: INTERNAL MEDICINE

## 2024-04-10 PROCEDURE — 3051F HG A1C>EQUAL 7.0%<8.0%: CPT | Performed by: INTERNAL MEDICINE

## 2024-04-10 PROCEDURE — 1036F TOBACCO NON-USER: CPT | Performed by: INTERNAL MEDICINE

## 2024-04-10 PROCEDURE — 3074F SYST BP LT 130 MM HG: CPT | Performed by: INTERNAL MEDICINE

## 2024-04-10 PROCEDURE — 3078F DIAST BP <80 MM HG: CPT | Performed by: INTERNAL MEDICINE

## 2024-04-10 RX ORDER — ATORVASTATIN CALCIUM 40 MG/1
40 TABLET, FILM COATED ORAL EVERY MORNING
Qty: 90 TABLET | Refills: 3 | Status: SHIPPED | OUTPATIENT
Start: 2024-04-10

## 2024-04-10 RX ORDER — BLOOD-GLUCOSE METER
KIT MISCELLANEOUS
Qty: 400 STRIP | Refills: 5 | Status: SHIPPED | OUTPATIENT
Start: 2024-04-10

## 2024-04-10 RX ORDER — PEN NEEDLE, DIABETIC 29 G X1/2"
1 NEEDLE, DISPOSABLE MISCELLANEOUS 3 TIMES DAILY
COMMUNITY
Start: 2024-01-02

## 2024-04-10 ASSESSMENT — PATIENT HEALTH QUESTIONNAIRE - PHQ9
9. THOUGHTS THAT YOU WOULD BE BETTER OFF DEAD, OR OF HURTING YOURSELF: NOT AT ALL
SUM OF ALL RESPONSES TO PHQ QUESTIONS 1-9: 5
8. MOVING OR SPEAKING SO SLOWLY THAT OTHER PEOPLE COULD HAVE NOTICED. OR THE OPPOSITE, BEING SO FIGETY OR RESTLESS THAT YOU HAVE BEEN MOVING AROUND A LOT MORE THAN USUAL: SEVERAL DAYS
SUM OF ALL RESPONSES TO PHQ QUESTIONS 1-9: 5
2. FEELING DOWN, DEPRESSED OR HOPELESS: NOT AT ALL
SUM OF ALL RESPONSES TO PHQ QUESTIONS 1-9: 5
6. FEELING BAD ABOUT YOURSELF - OR THAT YOU ARE A FAILURE OR HAVE LET YOURSELF OR YOUR FAMILY DOWN: NOT AT ALL
3. TROUBLE FALLING OR STAYING ASLEEP: MORE THAN HALF THE DAYS
SUM OF ALL RESPONSES TO PHQ9 QUESTIONS 1 & 2: 0
5. POOR APPETITE OR OVEREATING: NOT AT ALL
SUM OF ALL RESPONSES TO PHQ QUESTIONS 1-9: 5
7. TROUBLE CONCENTRATING ON THINGS, SUCH AS READING THE NEWSPAPER OR WATCHING TELEVISION: NOT AT ALL
4. FEELING TIRED OR HAVING LITTLE ENERGY: MORE THAN HALF THE DAYS
1. LITTLE INTEREST OR PLEASURE IN DOING THINGS: NOT AT ALL
10. IF YOU CHECKED OFF ANY PROBLEMS, HOW DIFFICULT HAVE THESE PROBLEMS MADE IT FOR YOU TO DO YOUR WORK, TAKE CARE OF THINGS AT HOME, OR GET ALONG WITH OTHER PEOPLE: NOT DIFFICULT AT ALL

## 2024-04-10 NOTE — PATIENT INSTRUCTIONS
deductible, co-insurance, and/or copay.    Some of these benefits include a comprehensive review of your medical history including lifestyle, illnesses that may run in your family, and various assessments and screenings as appropriate.    After reviewing your medical record and screening and assessments performed today your provider may have ordered immunizations, labs, imaging, and/or referrals for you.  A list of these orders (if applicable) as well as your Preventive Care list are included within your After Visit Summary for your review.    Other Preventive Recommendations:    A preventive eye exam performed by an eye specialist is recommended every 1-2 years to screen for glaucoma; cataracts, macular degeneration, and other eye disorders.  A preventive dental visit is recommended every 6 months.  Try to get at least 150 minutes of exercise per week or 10,000 steps per day on a pedometer .  Order or download the FREE \"Exercise & Physical Activity: Your Everyday Guide\" from [employer] [place] [employer]. Call 1-421.799.2776 or search [employer] [place] [employer] online.  You need 3259-3545 mg of calcium and 4121-6537 IU of vitamin D per day. It is possible to meet your calcium requirement with diet alone, but a vitamin D supplement is usually necessary to meet this goal.  When exposed to the sun, use a sunscreen that protects against both UVA and UVB radiation with an SPF of 30 or greater. Reapply every 2 to 3 hours or after sweating, drying off with a towel, or swimming.  Always wear a seat belt when traveling in a car. Always wear a helmet when riding a bicycle or motorcycle.

## 2024-04-10 NOTE — PROGRESS NOTES
Medicare Annual Wellness Visit    Lizette Murray is here for Medicare AWV    Assessment & Plan   Medicare annual wellness visit, subsequent  Mixed hyperlipidemia  Comments:  LDL at goal patient will continue Lipitor 40 mg daily  Coronary artery disease of native artery of native heart with stable angina pectoris ([facility])  Comments:  Patient has no unstable angina continue current medications and continue follow-up with cardiology  Obesity, Class III, BMI 40-49.9 (morbid obesity) ([facility])  Comments:  Discussed low carbohydrates and avoiding free sugars and desserts and illuminating fluid calories  Diabetes mellitus type 2 with complications ([facility])  Type 2 diabetes mellitus with stage 5 chronic kidney disease not on chronic dialysis, with long-term current use of insulin ([facility])  Comments:  A1c at goal patient will continue current dose of Basaglar  Bradycardia  Comments:  Asymptomatic bradycardia patient has appointment scheduled with cardiology Dr. Joseph  Acute respiratory failure with hypoxia ([facility])  Comments:  Patient became dependent on oxygen since her viral infection in February, patient to reschedule with pulmonary  Primary hypertension  ESRD on hemodialysis ([facility])  History of anemia due to chronic kidney disease  Comments:  Hemoglobin is stable recent H&H is above 10, continue follow-up with hematology Dr. Brad Hernandez  Benign neoplasm of mediastinal lymph node  Comments:  Patient had recent increase in the size on prior CT scans, will need repeat CT of the chest in the future and follow-up with pulmonary and oncology    Recommendations for Preventive Services Due: see orders and patient instructions/AVS.  Recommended screening schedule for the next 5-10 years is provided to the patient in written form: see Patient Instructions/AVS.     Return for Medicare Annual Wellness Visit in 1 year.     Subjective   The following acute and/or chronic problems were also addressed today: Follow-up on blood pressure and pulmonary

## 2024-04-22 ENCOUNTER — TELEPHONE (OUTPATIENT)
Dept: PRIMARY CARE CLINIC | Age: 69
End: 2024-04-22

## 2024-04-22 NOTE — TELEPHONE ENCOUNTER
Patient called stating that her insulin is no longer covered. She said this was a problem before. She is not sure if a prior authorization is needed or if it just needed changed. Advised we'd be on look out for PA request from pharmacy

## 2024-04-23 NOTE — PROGRESS NOTES
Patient for EGD today to assess for ulcers from previous EGD on 1/18/23 prior to starting anticoagulation. Additional questions and concerns addressed. Labs and chart reviewed. Pt currently in dialysis. Consent signed. On 2 L O2 NC. Ok to proceed.     TAWNYA Jauregui - CNP 2/10/2023 9:28 AM no

## 2024-04-24 ENCOUNTER — HOSPITAL ENCOUNTER (OUTPATIENT)
Dept: CT IMAGING | Age: 69
Discharge: HOME | End: 2024-04-26
Attending: INTERNAL MEDICINE
Payer: MEDICARE

## 2024-04-24 DIAGNOSIS — R59.0 MEDIASTINAL LYMPHADENOPATHY: ICD-10-CM

## 2024-04-24 PROCEDURE — 71250 CT THORAX DX C-: CPT

## 2024-05-01 DIAGNOSIS — I89.0 LYMPHEDEMA OF BOTH LOWER EXTREMITIES: Primary | ICD-10-CM

## 2024-05-07 ENCOUNTER — OFFICE VISIT (OUTPATIENT)
Dept: ONCOLOGY | Age: 69
End: 2024-05-07
Payer: MEDICARE

## 2024-05-07 ENCOUNTER — HOSPITAL ENCOUNTER (OUTPATIENT)
Dept: INFUSION THERAPY | Age: 69
Discharge: HOME | End: 2024-05-07
Payer: MEDICARE

## 2024-05-07 VITALS
TEMPERATURE: 98 F | SYSTOLIC BLOOD PRESSURE: 122 MMHG | HEART RATE: 80 BPM | BODY MASS INDEX: 43.54 KG/M2 | OXYGEN SATURATION: 94 % | DIASTOLIC BLOOD PRESSURE: 57 MMHG | HEIGHT: 64 IN | WEIGHT: 255 LBS

## 2024-05-07 DIAGNOSIS — N18.9 HISTORY OF ANEMIA DUE TO CKD: ICD-10-CM

## 2024-05-07 DIAGNOSIS — R59.0 MEDIASTINAL LYMPHADENOPATHY: Primary | ICD-10-CM

## 2024-05-07 DIAGNOSIS — I89.0 LYMPHEDEMA OF BOTH LOWER EXTREMITIES: ICD-10-CM

## 2024-05-07 DIAGNOSIS — Z86.2 HISTORY OF ANEMIA DUE TO CKD: ICD-10-CM

## 2024-05-07 LAB
ALBUMIN SERPL-MCNC: 4 G/DL (ref 3.5–5.2)
ALP SERPL-CCNC: 105 U/L (ref 35–104)
ALT SERPL-CCNC: 10 U/L (ref 0–32)
ANION GAP SERPL CALCULATED.3IONS-SCNC: 15 MMOL/L (ref 7–16)
AST SERPL-CCNC: 15 U/L (ref 0–31)
BASOPHILS # BLD: 0.05 K/UL (ref 0–0.2)
BASOPHILS NFR BLD: 1 % (ref 0–2)
BILIRUB SERPL-MCNC: 0.3 MG/DL (ref 0–1.2)
BUN SERPL-MCNC: 23 MG/DL (ref 6–23)
CALCIUM SERPL-MCNC: 9.4 MG/DL (ref 8.6–10.2)
CHLORIDE SERPL-SCNC: 89 MMOL/L (ref 98–107)
CO2 SERPL-SCNC: 32 MMOL/L (ref 22–29)
CREAT SERPL-MCNC: 4 MG/DL (ref 0.5–1)
EOSINOPHIL # BLD: 0.68 K/UL (ref 0.05–0.5)
EOSINOPHILS RELATIVE PERCENT: 7 % (ref 0–6)
ERYTHROCYTE [DISTWIDTH] IN BLOOD BY AUTOMATED COUNT: 14 % (ref 11.5–15)
GFR, ESTIMATED: 12 ML/MIN/1.73M2
GLUCOSE SERPL-MCNC: 233 MG/DL (ref 74–99)
HCT VFR BLD AUTO: 34.5 % (ref 34–48)
HGB BLD-MCNC: 11.4 G/DL (ref 11.5–15.5)
IMM GRANULOCYTES # BLD AUTO: 0.04 K/UL (ref 0–0.58)
IMM GRANULOCYTES NFR BLD: 0 % (ref 0–5)
LYMPHOCYTES NFR BLD: 1.05 K/UL (ref 1.5–4)
LYMPHOCYTES RELATIVE PERCENT: 11 % (ref 20–42)
MCH RBC QN AUTO: 33.4 PG (ref 26–35)
MCHC RBC AUTO-ENTMCNC: 33 G/DL (ref 32–34.5)
MCV RBC AUTO: 101.2 FL (ref 80–99.9)
MONOCYTES NFR BLD: 0.53 K/UL (ref 0.1–0.95)
MONOCYTES NFR BLD: 5 % (ref 2–12)
NEUTROPHILS NFR BLD: 76 % (ref 43–80)
NEUTS SEG NFR BLD: 7.5 K/UL (ref 1.8–7.3)
PLATELET, FLUORESCENCE: 136 K/UL (ref 130–450)
PMV BLD AUTO: 11.2 FL (ref 7–12)
POTASSIUM SERPL-SCNC: 4.3 MMOL/L (ref 3.5–5)
PROT SERPL-MCNC: 7.4 G/DL (ref 6.4–8.3)
RBC # BLD AUTO: 3.41 M/UL (ref 3.5–5.5)
SODIUM SERPL-SCNC: 136 MMOL/L (ref 132–146)
WBC OTHER # BLD: 9.9 K/UL (ref 4.5–11.5)

## 2024-05-07 PROCEDURE — G8400 PT W/DXA NO RESULTS DOC: HCPCS | Performed by: INTERNAL MEDICINE

## 2024-05-07 PROCEDURE — 1090F PRES/ABSN URINE INCON ASSESS: CPT | Performed by: INTERNAL MEDICINE

## 2024-05-07 PROCEDURE — 3074F SYST BP LT 130 MM HG: CPT | Performed by: INTERNAL MEDICINE

## 2024-05-07 PROCEDURE — 3017F COLORECTAL CA SCREEN DOC REV: CPT | Performed by: INTERNAL MEDICINE

## 2024-05-07 PROCEDURE — 99214 OFFICE O/P EST MOD 30 MIN: CPT | Performed by: INTERNAL MEDICINE

## 2024-05-07 PROCEDURE — 3078F DIAST BP <80 MM HG: CPT | Performed by: INTERNAL MEDICINE

## 2024-05-07 PROCEDURE — 36415 COLL VENOUS BLD VENIPUNCTURE: CPT

## 2024-05-07 PROCEDURE — G8417 CALC BMI ABV UP PARAM F/U: HCPCS | Performed by: INTERNAL MEDICINE

## 2024-05-07 PROCEDURE — 80053 COMPREHEN METABOLIC PANEL: CPT

## 2024-05-07 PROCEDURE — G8427 DOCREV CUR MEDS BY ELIG CLIN: HCPCS | Performed by: INTERNAL MEDICINE

## 2024-05-07 PROCEDURE — 85025 COMPLETE CBC W/AUTO DIFF WBC: CPT

## 2024-05-07 PROCEDURE — 1036F TOBACCO NON-USER: CPT | Performed by: INTERNAL MEDICINE

## 2024-05-07 PROCEDURE — 1123F ACP DISCUSS/DSCN MKR DOCD: CPT | Performed by: INTERNAL MEDICINE

## 2024-05-07 RX ORDER — INSULIN ASPART INJECTION 100 [IU]/ML
INJECTION, SOLUTION SUBCUTANEOUS
COMMUNITY
Start: 2024-04-29

## 2024-05-07 NOTE — PROGRESS NOTES
Patient refused printed AVS.   Pt verbalized understanding of follow up plan of care.  All questions answered.     
[facility] MED ONCOLOGY  [address] [facility] [address] [address]  Dept: [phone]  Loc: [phone]

## 2024-07-22 ENCOUNTER — TELEPHONE (OUTPATIENT)
Dept: VASCULAR SURGERY | Age: 69
End: 2024-07-22

## 2024-07-22 NOTE — TELEPHONE ENCOUNTER
Ghassan from [facility] dialysis called stating pt has had prolonged bleeding post-tx.  Scheduled (L) fistulogram with Dr. Henry 7/30/24 at 11:30 a.m.  Ghassan will notify and instruct the pt to report to [facility] at 10:30 a.m and to be NPO after midnight the night before except heart and/or BP meds the morning of with sips of water.

## 2024-07-30 ENCOUNTER — HOSPITAL ENCOUNTER (OUTPATIENT)
Age: 69
Setting detail: OUTPATIENT SURGERY
Discharge: HOME | End: 2024-07-30
Attending: SURGERY | Admitting: SURGERY
Payer: MEDICARE

## 2024-07-30 VITALS
HEIGHT: 64 IN | HEART RATE: 73 BPM | RESPIRATION RATE: 23 BRPM | OXYGEN SATURATION: 98 % | BODY MASS INDEX: 43.54 KG/M2 | DIASTOLIC BLOOD PRESSURE: 67 MMHG | SYSTOLIC BLOOD PRESSURE: 113 MMHG | WEIGHT: 255 LBS | TEMPERATURE: 97.1 F

## 2024-07-30 DIAGNOSIS — N18.6 END STAGE RENAL DISEASE (HCC): ICD-10-CM

## 2024-07-30 PROBLEM — Z99.2 ENCOUNTER REGARDING VASCULAR ACCESS FOR DIALYSIS FOR ESRD (HCC): Chronic | Status: ACTIVE | Noted: 2024-07-30

## 2024-07-30 PROBLEM — Z95.828 PRESENCE OF INFERIOR VENA CAVA FILTER: Status: RESOLVED | Noted: 2023-01-20 | Resolved: 2024-07-30

## 2024-07-30 LAB — ECHO BSA: 2.29 M2

## 2024-07-30 PROCEDURE — C1769 GUIDE WIRE: HCPCS | Performed by: SURGERY

## 2024-07-30 PROCEDURE — 36901 INTRO CATH DIALYSIS CIRCUIT: CPT | Performed by: SURGERY

## 2024-07-30 PROCEDURE — 2709999900 HC NON-CHARGEABLE SUPPLY: Performed by: SURGERY

## 2024-07-30 PROCEDURE — 6360000004 HC RX CONTRAST MEDICATION: Performed by: SURGERY

## 2024-07-30 PROCEDURE — 76937 US GUIDE VASCULAR ACCESS: CPT | Performed by: SURGERY

## 2024-07-30 PROCEDURE — C1894 INTRO/SHEATH, NON-LASER: HCPCS | Performed by: SURGERY

## 2024-07-30 PROCEDURE — 2500000003 HC RX 250 WO HCPCS: Performed by: SURGERY

## 2024-07-30 RX ORDER — SODIUM CHLORIDE 9 MG/ML
INJECTION, SOLUTION INTRAVENOUS PRN
Status: DISCONTINUED | OUTPATIENT
Start: 2024-07-30 | End: 2024-08-04 | Stop reason: HOSPADM

## 2024-07-30 RX ORDER — SODIUM CHLORIDE 0.9 % (FLUSH) 0.9 %
5-40 SYRINGE (ML) INJECTION PRN
Status: DISCONTINUED | OUTPATIENT
Start: 2024-07-30 | End: 2024-08-04 | Stop reason: HOSPADM

## 2024-07-30 RX ORDER — SODIUM CHLORIDE 0.9 % (FLUSH) 0.9 %
5-40 SYRINGE (ML) INJECTION EVERY 12 HOURS SCHEDULED
Status: DISCONTINUED | OUTPATIENT
Start: 2024-07-30 | End: 2024-08-04 | Stop reason: HOSPADM

## 2024-07-30 NOTE — H&P
Vascular Surgery History & Physical Exam      Chief Complaint:  ESRD, evaluate AVF    HISTORY OF PRESENT ILLNESS:                The patient is a 69 y.o. female who presents to the hospital for elective fistulagram with possible intervention.  The patient has a history of chronic renal failure and a left AVF which is not functioning well.  Per pt she is having prolonged bleeding.    IMPRESSION:   ESRD, evaluate AVF    PLAN:  Fistulagram, possible intervention.    I reviewed the procedure with the patient and family as available.  I discussed the procedure, risks, benefits, complications, and alternatives of the procedure.  They understand and consent.  All questions were answered    ROS : All others Negative if blank [], Positive if [x]  General   [] Fevers   [] Chills   [] Weight Loss   Skin   [] Tissue Loss   Eyes   [] Wears Glasses/Contacts   [] Vision Changes   Respiratory    [x] Shortness of breath   Cardiovascular   [] Chest Pain   [x] Shortness of breath with exertion   Gastrointestinal   [] Abdominal Pain     Past Medical History:   Diagnosis Date    Acute on chronic heart failure with preserved ejection fraction ([facility]) 02/07/2023    Acute pulmonary embolism ([facility]) 12/03/2022    Anemia due to stage 5 chronic kidney disease, not on chronic dialysis ([facility]) 02/08/2023    Anemia in CKD (chronic kidney disease) 11/30/2021    Anxiety and depression 01/19/2022    Aortic stenosis, mild 02/08/2023 12/2022    At high risk for falls 01/19/2022    Brain aneurysm     CLABSI (central line-associated bloodstream infection) 11/19/2021    Cough 01/19/2022    Diabetes mellitus ([facility]) 2006    Diabetes mellitus type 2 with complications ([facility]) 11/30/2021    Dizziness on standing 01/19/2022    End-stage renal disease on hemodialysis ([facility]) 01/19/2023    ESRD (end stage renal disease) ([facility]) 11/19/2021    ESRD on hemodialysis ([facility]) 11/19/2021    Essential hypertension 11/30/2021    Fever, unspecified     Gastrointestinal hemorrhage

## 2024-07-30 NOTE — DISCHARGE INSTRUCTIONS
Discharge Instructions for Fistulagram      Call Dr. Henry's office [phone] for follow-up appointment.      An fistulagram , is an x-ray used by physicians to diagnose blockages and other problems in veins. A catheter is inserted into a blood vessel, and a special dye is injected that makes blood vessels visible when an x-ray is taken.   The procedure can take one hour to several hours. Afterwards, you may need to be in a recovery area for an hour, where you will be monitored by a nurse.    What You Will Need   Along with your medications, you will need bandages   Steps to Take   Home Care    Change the dressing around the catheter incision area as instructed.    Bathe and shower as usual, but keep the wound dry and covered with a bandage for the first day after surgery.    Diet    You may go back to your regular diet after the procedure.   Physical Activity    Do not lift heavy objects or engage in any strenuous exercise or sexual activity for a minimum of 24 hours.    Ask your doctor when you will be able to return to work.    Do not drive until your doctor tells you to do so.    Medications    If you are taking medications, follow these general guidelines:   Talk to your doctor about what the best pain relief medicine for you is.   Take your medication as directednot more, not less, not at a different time. If you are taking warfarin or persantine, your doctor will tell you when to stop these.   Do not stop taking your medications without consulting your healthcare provider.   Don't share medications with anyone else.   Know what effects and side effects to expect, and report them to your healthcare provider.   If you are taking more than one drug, even if it is an over-the-counter medication, herb, or dietary supplement, be sure to check with a physician or pharmacist about drug interactions.   Plan ahead for refills so you don't run out.   Follow-up   Your doctor will discuss the findings and suggest

## 2024-07-30 NOTE — OP NOTE
Cardiovascular Lab Procedure Report     Lizette Murray  1955    Date : 7/30/2024  Surgeon: Eber Henry M.D.  Pre-procedure Diagnosis: ESRD, Poorly functioning fistula  Post-procedure Diagnosis: Same  Procedure:     US guided access of the Left cephalic vein   Left upper extremity fistulagram  Anesthesia: Local   Assistants: Cath Lab Staff  Estimated Blood Loss: Minimal  Complications: none  Findings:   Left   Arterial anastomosis Patent   Proximal Cephalic Vein Patent   Mid Cephalic Vein Patent   Distal Cephailc Vein Patent   Swing Segment - cephalic subclavian Patent   Axillary Vein Patent   Subclavian Vein Patent   Brachiocephalic Vein Patent   Superior Vena Cava Patent     Procedure Details :  Timeout preformed identifying pt and procedure. Left arm prepped and draped in sterile fashion. The Left cephalic vein-fistula was identified under US and noted to be patent.  It was accessed under US guidance after infiltrating with local. Printer used to print a still image.  Micropuncture placed, exchanged out for 4 fr sheath. Fistulogram preformed.      Sheath and wire were removed and pressure was held.      Postop Exam  Left arm   - Radial 1+   - Thrill is is unchanged    Plan  No plans for surgical intervention  Discussed with BRITTANY Jones  Staff at center will need to hold - when they do doesn't seem like pt has bleeding issues per pt report  Patient is not strong enough due to arthritis in shoulders to hold her self  Suspect when clamps are used that they are rolling off the access sites  Will have pt f/u with me in office    Eber Henry MD

## 2024-08-05 RX ORDER — PEN NEEDLE, DIABETIC 29 G X1/2"
NEEDLE, DISPOSABLE MISCELLANEOUS
Qty: 90 EACH | Refills: 0 | Status: SHIPPED | OUTPATIENT
Start: 2024-08-05 | End: 2024-11-03

## 2024-08-06 ENCOUNTER — HOSPITAL ENCOUNTER (INPATIENT)
Age: 69
LOS: 2 days | Discharge: HOME | End: 2024-08-08
Attending: STUDENT IN AN ORGANIZED HEALTH CARE EDUCATION/TRAINING PROGRAM | Admitting: INTERNAL MEDICINE
Payer: MEDICARE

## 2024-08-06 ENCOUNTER — APPOINTMENT (OUTPATIENT)
Dept: GENERAL RADIOLOGY | Age: 69
End: 2024-08-06
Payer: MEDICARE

## 2024-08-06 DIAGNOSIS — Z99.2 ESRD ON DIALYSIS (HCC): ICD-10-CM

## 2024-08-06 DIAGNOSIS — E87.70 HYPERVOLEMIA, UNSPECIFIED HYPERVOLEMIA TYPE: Primary | ICD-10-CM

## 2024-08-06 DIAGNOSIS — N18.6 ESRD ON DIALYSIS (HCC): ICD-10-CM

## 2024-08-06 LAB
ALBUMIN SERPL-MCNC: 4.1 G/DL (ref 3.5–5.2)
ALP SERPL-CCNC: 82 U/L (ref 35–104)
ALT SERPL-CCNC: 5 U/L (ref 0–32)
ANION GAP SERPL CALCULATED.3IONS-SCNC: 13 MMOL/L (ref 7–16)
AST SERPL-CCNC: 15 U/L (ref 0–31)
BASOPHILS # BLD: 0.05 K/UL (ref 0–0.2)
BASOPHILS NFR BLD: 1 % (ref 0–2)
BILIRUB SERPL-MCNC: 0.4 MG/DL (ref 0–1.2)
BNP SERPL-MCNC: ABNORMAL PG/ML (ref 0–125)
BUN SERPL-MCNC: 36 MG/DL (ref 6–23)
CALCIUM SERPL-MCNC: 9.4 MG/DL (ref 8.6–10.2)
CHLORIDE SERPL-SCNC: 91 MMOL/L (ref 98–107)
CO2 SERPL-SCNC: 33 MMOL/L (ref 22–29)
CREAT SERPL-MCNC: 5.1 MG/DL (ref 0.5–1)
EOSINOPHIL # BLD: 0.24 K/UL (ref 0.05–0.5)
EOSINOPHILS RELATIVE PERCENT: 3 % (ref 0–6)
ERYTHROCYTE [DISTWIDTH] IN BLOOD BY AUTOMATED COUNT: 15 % (ref 11.5–15)
GFR, ESTIMATED: 9 ML/MIN/1.73M2
GLUCOSE BLD-MCNC: 167 MG/DL (ref 74–99)
GLUCOSE SERPL-MCNC: 187 MG/DL (ref 74–99)
HCT VFR BLD AUTO: 35.4 % (ref 34–48)
HGB BLD-MCNC: 10.9 G/DL (ref 11.5–15.5)
IMM GRANULOCYTES # BLD AUTO: 0.04 K/UL (ref 0–0.58)
IMM GRANULOCYTES NFR BLD: 0 % (ref 0–5)
LACTATE BLDV-SCNC: 1.6 MMOL/L (ref 0.5–2.2)
LIPASE SERPL-CCNC: 23 U/L (ref 13–60)
LYMPHOCYTES NFR BLD: 0.87 K/UL (ref 1.5–4)
LYMPHOCYTES RELATIVE PERCENT: 9 % (ref 20–42)
MCH RBC QN AUTO: 32 PG (ref 26–35)
MCHC RBC AUTO-ENTMCNC: 30.8 G/DL (ref 32–34.5)
MCV RBC AUTO: 103.8 FL (ref 80–99.9)
MONOCYTES NFR BLD: 0.58 K/UL (ref 0.1–0.95)
MONOCYTES NFR BLD: 6 % (ref 2–12)
NEUTROPHILS NFR BLD: 81 % (ref 43–80)
NEUTS SEG NFR BLD: 7.65 K/UL (ref 1.8–7.3)
PLATELET # BLD AUTO: 137 K/UL (ref 130–450)
PMV BLD AUTO: 10.4 FL (ref 7–12)
POTASSIUM SERPL-SCNC: 5.4 MMOL/L (ref 3.5–5)
PROT SERPL-MCNC: 7.6 G/DL (ref 6.4–8.3)
RBC # BLD AUTO: 3.41 M/UL (ref 3.5–5.5)
SARS-COV-2 RDRP RESP QL NAA+PROBE: NOT DETECTED
SODIUM SERPL-SCNC: 137 MMOL/L (ref 132–146)
SPECIMEN DESCRIPTION: NORMAL
TROPONIN I SERPL HS-MCNC: 68 NG/L (ref 0–9)
TROPONIN I SERPL HS-MCNC: 77 NG/L (ref 0–9)
WBC OTHER # BLD: 9.4 K/UL (ref 4.5–11.5)

## 2024-08-06 PROCEDURE — 85025 COMPLETE CBC W/AUTO DIFF WBC: CPT

## 2024-08-06 PROCEDURE — 83690 ASSAY OF LIPASE: CPT

## 2024-08-06 PROCEDURE — 71045 X-RAY EXAM CHEST 1 VIEW: CPT

## 2024-08-06 PROCEDURE — 99285 EMERGENCY DEPT VISIT HI MDM: CPT

## 2024-08-06 PROCEDURE — 80053 COMPREHEN METABOLIC PANEL: CPT

## 2024-08-06 PROCEDURE — 96365 THER/PROPH/DIAG IV INF INIT: CPT

## 2024-08-06 PROCEDURE — 96375 TX/PRO/DX INJ NEW DRUG ADDON: CPT

## 2024-08-06 PROCEDURE — 83880 ASSAY OF NATRIURETIC PEPTIDE: CPT

## 2024-08-06 PROCEDURE — 2580000003 HC RX 258: Performed by: STUDENT IN AN ORGANIZED HEALTH CARE EDUCATION/TRAINING PROGRAM

## 2024-08-06 PROCEDURE — 1200000000 HC SEMI PRIVATE

## 2024-08-06 PROCEDURE — 93005 ELECTROCARDIOGRAM TRACING: CPT | Performed by: STUDENT IN AN ORGANIZED HEALTH CARE EDUCATION/TRAINING PROGRAM

## 2024-08-06 PROCEDURE — 87635 SARS-COV-2 COVID-19 AMP PRB: CPT

## 2024-08-06 PROCEDURE — 82962 GLUCOSE BLOOD TEST: CPT

## 2024-08-06 PROCEDURE — 6370000000 HC RX 637 (ALT 250 FOR IP): Performed by: STUDENT IN AN ORGANIZED HEALTH CARE EDUCATION/TRAINING PROGRAM

## 2024-08-06 PROCEDURE — 83605 ASSAY OF LACTIC ACID: CPT

## 2024-08-06 PROCEDURE — 84484 ASSAY OF TROPONIN QUANT: CPT

## 2024-08-06 PROCEDURE — 2500000003 HC RX 250 WO HCPCS: Performed by: STUDENT IN AN ORGANIZED HEALTH CARE EDUCATION/TRAINING PROGRAM

## 2024-08-06 RX ORDER — CALCIUM GLUCONATE 20 MG/ML
1000 INJECTION, SOLUTION INTRAVENOUS ONCE
Status: COMPLETED | OUTPATIENT
Start: 2024-08-06 | End: 2024-08-06

## 2024-08-06 RX ORDER — GLUCAGON 1 MG/ML
1 KIT INJECTION PRN
Status: DISCONTINUED | OUTPATIENT
Start: 2024-08-06 | End: 2024-08-07 | Stop reason: SDUPTHER

## 2024-08-06 RX ORDER — DEXTROSE MONOHYDRATE 100 MG/ML
INJECTION, SOLUTION INTRAVENOUS CONTINUOUS PRN
Status: DISCONTINUED | OUTPATIENT
Start: 2024-08-06 | End: 2024-08-07 | Stop reason: SDUPTHER

## 2024-08-06 RX ADMIN — INSULIN HUMAN 5 UNITS: 100 INJECTION, SOLUTION PARENTERAL at 22:29

## 2024-08-06 RX ADMIN — DEXTROSE MONOHYDRATE 250 ML: 100 INJECTION, SOLUTION INTRAVENOUS at 22:27

## 2024-08-06 RX ADMIN — CALCIUM GLUCONATE 1000 MG: 20 INJECTION, SOLUTION INTRAVENOUS at 22:26

## 2024-08-07 PROBLEM — N18.6 ESRD (END STAGE RENAL DISEASE) (HCC): Status: ACTIVE | Noted: 2024-08-07

## 2024-08-07 LAB
ALBUMIN: 3.8 G/DL (ref 3.5–5.2)
ANION GAP SERPL CALCULATED.3IONS-SCNC: 14 MMOL/L (ref 7–16)
BUN SERPL-MCNC: 38 MG/DL (ref 6–23)
CALCIUM SERPL-MCNC: 9.1 MG/DL (ref 8.6–10.2)
CHLORIDE SERPL-SCNC: 90 MMOL/L (ref 98–107)
CO2 SERPL-SCNC: 30 MMOL/L (ref 22–29)
CREAT SERPL-MCNC: 5.6 MG/DL (ref 0.5–1)
GFR, ESTIMATED: 8 ML/MIN/1.73M2
GLUCOSE BLD-MCNC: 149 MG/DL (ref 74–99)
GLUCOSE BLD-MCNC: 152 MG/DL (ref 74–99)
GLUCOSE BLD-MCNC: 195 MG/DL (ref 74–99)
GLUCOSE BLD-MCNC: 249 MG/DL (ref 74–99)
GLUCOSE SERPL-MCNC: 191 MG/DL (ref 74–99)
HBA1C MFR BLD: 7 % (ref 4–5.6)
PHOSPHATE SERPL-MCNC: 4.6 MG/DL (ref 2.5–4.5)
POTASSIUM SERPL-SCNC: 5.2 MMOL/L (ref 3.5–5)
POTASSIUM SERPL-SCNC: 5.3 MMOL/L (ref 3.5–5)
SODIUM SERPL-SCNC: 134 MMOL/L (ref 132–146)

## 2024-08-07 PROCEDURE — 5A1D70Z PERFORMANCE OF URINARY FILTRATION, INTERMITTENT, LESS THAN 6 HOURS PER DAY: ICD-10-PCS | Performed by: INTERNAL MEDICINE

## 2024-08-07 PROCEDURE — 84132 ASSAY OF SERUM POTASSIUM: CPT

## 2024-08-07 PROCEDURE — 83036 HEMOGLOBIN GLYCOSYLATED A1C: CPT

## 2024-08-07 PROCEDURE — 2580000003 HC RX 258: Performed by: INTERNAL MEDICINE

## 2024-08-07 PROCEDURE — 82962 GLUCOSE BLOOD TEST: CPT

## 2024-08-07 PROCEDURE — 99222 1ST HOSP IP/OBS MODERATE 55: CPT | Performed by: INTERNAL MEDICINE

## 2024-08-07 PROCEDURE — 6360000002 HC RX W HCPCS: Performed by: INTERNAL MEDICINE

## 2024-08-07 PROCEDURE — 2500000003 HC RX 250 WO HCPCS: Performed by: INTERNAL MEDICINE

## 2024-08-07 PROCEDURE — 1200000000 HC SEMI PRIVATE

## 2024-08-07 PROCEDURE — 90935 HEMODIALYSIS ONE EVALUATION: CPT

## 2024-08-07 PROCEDURE — 6370000000 HC RX 637 (ALT 250 FOR IP): Performed by: INTERNAL MEDICINE

## 2024-08-07 PROCEDURE — 80069 RENAL FUNCTION PANEL: CPT

## 2024-08-07 RX ORDER — SODIUM CHLORIDE 0.9 % (FLUSH) 0.9 %
5-40 SYRINGE (ML) INJECTION PRN
Status: DISCONTINUED | OUTPATIENT
Start: 2024-08-07 | End: 2024-08-08 | Stop reason: HOSPADM

## 2024-08-07 RX ORDER — MIDODRINE HYDROCHLORIDE 5 MG/1
10 TABLET ORAL 2 TIMES DAILY PRN
Status: DISCONTINUED | OUTPATIENT
Start: 2024-08-07 | End: 2024-08-08 | Stop reason: HOSPADM

## 2024-08-07 RX ORDER — SODIUM CHLORIDE 9 MG/ML
INJECTION, SOLUTION INTRAVENOUS PRN
Status: DISCONTINUED | OUTPATIENT
Start: 2024-08-07 | End: 2024-08-08 | Stop reason: HOSPADM

## 2024-08-07 RX ORDER — ACETAMINOPHEN 325 MG/1
650 TABLET ORAL EVERY 6 HOURS PRN
Status: DISCONTINUED | OUTPATIENT
Start: 2024-08-07 | End: 2024-08-08 | Stop reason: HOSPADM

## 2024-08-07 RX ORDER — POLYETHYLENE GLYCOL 3350 17 G/17G
17 POWDER, FOR SOLUTION ORAL DAILY PRN
Status: DISCONTINUED | OUTPATIENT
Start: 2024-08-07 | End: 2024-08-08 | Stop reason: HOSPADM

## 2024-08-07 RX ORDER — INSULIN LISPRO 100 [IU]/ML
0-4 INJECTION, SOLUTION INTRAVENOUS; SUBCUTANEOUS
Status: DISCONTINUED | OUTPATIENT
Start: 2024-08-07 | End: 2024-08-08 | Stop reason: HOSPADM

## 2024-08-07 RX ORDER — INSULIN GLARGINE 100 [IU]/ML
15 INJECTION, SOLUTION SUBCUTANEOUS EVERY MORNING
Status: DISCONTINUED | OUTPATIENT
Start: 2024-08-08 | End: 2024-08-08 | Stop reason: HOSPADM

## 2024-08-07 RX ORDER — GLUCAGON 1 MG/ML
1 KIT INJECTION PRN
Status: DISCONTINUED | OUTPATIENT
Start: 2024-08-07 | End: 2024-08-08 | Stop reason: HOSPADM

## 2024-08-07 RX ORDER — SODIUM CHLORIDE 0.9 % (FLUSH) 0.9 %
5-40 SYRINGE (ML) INJECTION EVERY 12 HOURS SCHEDULED
Status: DISCONTINUED | OUTPATIENT
Start: 2024-08-07 | End: 2024-08-08 | Stop reason: HOSPADM

## 2024-08-07 RX ORDER — MIDODRINE HYDROCHLORIDE 5 MG/1
10 TABLET ORAL
Status: DISCONTINUED | OUTPATIENT
Start: 2024-08-08 | End: 2024-08-08 | Stop reason: HOSPADM

## 2024-08-07 RX ORDER — HEPARIN SODIUM 5000 [USP'U]/ML
5000 INJECTION, SOLUTION INTRAVENOUS; SUBCUTANEOUS EVERY 8 HOURS SCHEDULED
Status: DISCONTINUED | OUTPATIENT
Start: 2024-08-07 | End: 2024-08-08 | Stop reason: HOSPADM

## 2024-08-07 RX ORDER — INSULIN LISPRO 100 [IU]/ML
0-4 INJECTION, SOLUTION INTRAVENOUS; SUBCUTANEOUS NIGHTLY
Status: DISCONTINUED | OUTPATIENT
Start: 2024-08-07 | End: 2024-08-08 | Stop reason: HOSPADM

## 2024-08-07 RX ORDER — ATORVASTATIN CALCIUM 40 MG/1
40 TABLET, FILM COATED ORAL EVERY MORNING
Status: DISCONTINUED | OUTPATIENT
Start: 2024-08-08 | End: 2024-08-08 | Stop reason: HOSPADM

## 2024-08-07 RX ORDER — ACETAMINOPHEN 650 MG/1
650 SUPPOSITORY RECTAL EVERY 6 HOURS PRN
Status: DISCONTINUED | OUTPATIENT
Start: 2024-08-07 | End: 2024-08-08 | Stop reason: HOSPADM

## 2024-08-07 RX ORDER — ONDANSETRON 4 MG/1
4 TABLET, ORALLY DISINTEGRATING ORAL EVERY 8 HOURS PRN
Status: DISCONTINUED | OUTPATIENT
Start: 2024-08-07 | End: 2024-08-08 | Stop reason: HOSPADM

## 2024-08-07 RX ORDER — DEXTROSE MONOHYDRATE 100 MG/ML
INJECTION, SOLUTION INTRAVENOUS CONTINUOUS PRN
Status: DISCONTINUED | OUTPATIENT
Start: 2024-08-07 | End: 2024-08-08 | Stop reason: HOSPADM

## 2024-08-07 RX ORDER — CALCIUM ACETATE 667 MG/1
2 CAPSULE ORAL
Status: DISCONTINUED | OUTPATIENT
Start: 2024-08-07 | End: 2024-08-08 | Stop reason: HOSPADM

## 2024-08-07 RX ORDER — ASPIRIN 81 MG/1
81 TABLET, CHEWABLE ORAL EVERY MORNING
Status: DISCONTINUED | OUTPATIENT
Start: 2024-08-08 | End: 2024-08-08 | Stop reason: HOSPADM

## 2024-08-07 RX ORDER — ONDANSETRON 2 MG/ML
4 INJECTION INTRAMUSCULAR; INTRAVENOUS EVERY 6 HOURS PRN
Status: DISCONTINUED | OUTPATIENT
Start: 2024-08-07 | End: 2024-08-08 | Stop reason: HOSPADM

## 2024-08-07 RX ADMIN — CALCIUM ACETATE 1334 MG: 667 CAPSULE ORAL at 18:07

## 2024-08-07 RX ADMIN — ACETAMINOPHEN 650 MG: 325 TABLET ORAL at 22:56

## 2024-08-07 RX ADMIN — SODIUM CHLORIDE, PRESERVATIVE FREE 10 ML: 5 INJECTION INTRAVENOUS at 21:07

## 2024-08-07 RX ADMIN — SODIUM CHLORIDE, PRESERVATIVE FREE 10 ML: 5 INJECTION INTRAVENOUS at 17:51

## 2024-08-07 RX ADMIN — HEPARIN SODIUM 5000 UNITS: 5000 INJECTION INTRAVENOUS; SUBCUTANEOUS at 21:07

## 2024-08-07 ASSESSMENT — PAIN - FUNCTIONAL ASSESSMENT: PAIN_FUNCTIONAL_ASSESSMENT: ACTIVITIES ARE NOT PREVENTED

## 2024-08-07 ASSESSMENT — PAIN SCALES - GENERAL
PAINLEVEL_OUTOF10: 0
PAINLEVEL_OUTOF10: 7
PAINLEVEL_OUTOF10: 0

## 2024-08-07 ASSESSMENT — PAIN DESCRIPTION - LOCATION: LOCATION: NECK

## 2024-08-07 NOTE — PROGRESS NOTES
Consult completed to nephro in ED - orders placed for HD already    Electronically signed by Mary Herrera RN on 8/7/2024 at 6:16 PM

## 2024-08-07 NOTE — PROGRESS NOTES
Patient admitted by nighttime hospitalist earlier today.  Patient was seen and examined today.  Patient was admitted for SOB.    GEN: NAD  Card: RRR  Pulm: Bibasilar crackles  ABD: NTND, + BS  Ext: 2+ edema    Agree with H&P's assessment and plan, no changes    Electronically signed by Moses Gaspar MD on 8/7/2024 at 5:14 PM

## 2024-08-07 NOTE — PROGRESS NOTES
4 Eyes Skin Assessment     NAME:  Lizette Murray  YOB: 1955  MEDICAL RECORD NUMBER:  [id number]    The patient is being assessed for  Admission    I agree that at least one RN has performed a thorough Head to Toe Skin Assessment on the patient. ALL assessment sites listed below have been assessed.      Areas assessed by both nurses:    Head, Face, Ears, Shoulders, Back, Chest, Arms, Elbows, Hands, Sacrum. Buttock, Coccyx, Ischium, and Legs. Feet and Heels  Patient refused assessment of feet and heels.   Healing wounds in multiple stages of healing on legs, although patient unwilling to fully to show nursing during assessment.     Av Fistula in left upper arm.   1x1cm healed abrasion on mid-lower abdomen.     Does the Patient have a Wound? Yes wound(s) were present on assessment. LDA wound assessment was Initiated and completed by RN       Dm Prevention initiated by RN: No  Wound Care Orders initiated by RN: No    Pressure Injury (Stage 3,4, Unstageable, DTI, NWPT, and Complex wounds) if present, place Wound referral order by RN under : Yes    New Ostomies, if present place, Ostomy referral order under : No     Nurse 1 eSignature: Electronically signed by Liliya Cleaning RN on 8/7/24 at 6:10 PM EDT    **SHARE this note so that the co-signing nurse can place an eSignature**    Nurse 2 eSignature: Electronically signed by Nunu Ritter RN on 8/8/24 at 2:05 AM EDT\

## 2024-08-07 NOTE — H&P
[facility] Hospitalist Group History and Physical          PCP: Lourdes Murrell MD    Date of Admission: 8/6/2024    Date of Service: Pt seen/examined on 8/6/2024 and is admitted to Inpatient with expected LOS greater than two midnights due to medical therapy.  Placed in Observation.    Chief Complaint:  had no chief complaint listed for this encounter.    History Of Present Illness:    Ms. Lizette Murray, a 69 y.o. year old female  who  has a past medical history of Acute on chronic heart failure with preserved ejection fraction ([facility]), Acute pulmonary embolism ([facility]), Anemia due to stage 5 chronic kidney disease, not on chronic dialysis ([facility]), Anemia in CKD (chronic kidney disease), Anxiety and depression, Aortic stenosis, mild, At high risk for falls, Brain aneurysm, CLABSI (central line-associated bloodstream infection), Cough, Diabetes mellitus ([facility]), Diabetes mellitus type 2 with complications ([facility]), Dizziness on standing, Encounter regarding vascular access for dialysis for ESRD ([facility]), End-stage renal disease on hemodialysis ([facility]), ESRD (end stage renal disease) ([facility]), ESRD on hemodialysis ([facility]), Essential hypertension, Fever, unspecified, Gastrointestinal hemorrhage, H/O heart artery stent, History of Clostridioides difficile colitis, History of pulmonary embolus (PE), Hyperlipidemia, Hypertension, Leg swelling, Lymphedema of both lower extremities, Mild pulmonary hypertension ([facility]), MSSA bacteremia, Nausea & vomiting, Obesity, Class III, BMI 40-49.9 (morbid obesity) ([facility]), Periumbilical abdominal pain, and S/P insertion of inferior vena caval filter.     Who presents emergency department with shortness of breath, diaphoresis, increasing oxygen requirements.  Patient stated she had her dialysis session which was completed, she went home, lost power, her oxygen tank was not working, felt increasing shortness of breath, decided to come to the ED.  Denies any issues with the dialysis, has left upper  of breath TECHNOLOGIST PROVIDED HISTORY: Reason for exam:->short of breath FINDINGS: Single AP upright portable chest demonstrates prominent pulmonary vascularity with moderate cardiomegaly and questionable small pleural effusions consistent with CHF.     CHF.     Invasive vascular procedure    Result Date: 7/30/2024  S/p fistulagram see op note       ASSESSMENT and PLAN:    ESRD on HD  Hyperkalemia  Volume overload  Chronic anemia  Potassium 5.4, NT proBNP over 70,000, on HD TTSat  Had complete session of dialysis today, via LUE Fistula   BUN/creat 36/5.1, no acute emergent dialysis indication  Nephrology notified, dialysis in the morning, renal panel.  Received hyperkalemia protocol in the ED, telemetry.    Insulin-dependent T2DM  Stable, resume home basal insulin, Lantus insulin, diabetic diet    Chronic respiratory failure  Chronic HFpEF w/o exacerbation  Hypertension  Hyperlipidemia  Baseline 4 L nasal cannula oxygen, denies chest pain, SOB  Chronically elevated troponin in the setting of ESRD,  Resume Lipitor, aspirin, not on antihypertensive.  Bronchodilators    Diet: ADULT DIET; Regular; Low Sodium (2 gm); Low Phosphorus (Less than 1000 mg)  Code Status: Full Code    Surrogate decision maker confirmed with patient:  Primary Emergency Contact: Meghann Murray, Home Phone: [phone]    DVT Prophylaxis: []Lovenox [x]Heparin []PCD [] Warfarin/NOAC []Encouraged ambulation    Disposition: [x]Med/Surg [] Intermediate [] ICU/CCU  Admit status: [] Observation [] Inpatient     +++++++++++++++++++++++++++++++++++++++++++++++++  Dr. KIRK Conner [facility] - Hospitalist  [place], [place]  +++++++++++++++++++++++++++++++++++++++++++++++++  NOTE: This report was transcribed using voice recognition software. Every effort was made to ensure accuracy; however, inadvertent computerized transcription errors may be present.

## 2024-08-07 NOTE — FLOWSHEET NOTE
08/07/24 1500   Vital Signs   BP (!) 90/52   Temp 98.3 °F (36.8 °C)   Pulse 60   Respirations 18   Pain Assessment   Pain Assessment None - Denies Pain   Pain Level 0   Post-Hemodialysis Assessment   Post-Treatment Procedures Blood returned;Access bleeding time < 10 minutes   Machine Disinfection Process Exterior Machine Disinfection   Rinseback Volume (ml) 300 ml   Blood Volume Processed (Liters) 63.4 L   Dialyzer Clearance Moderately streaked   Duration of Treatment (minutes) 180 minutes   Heparin Amount Administered During Treatment (mL) 0 mL   Hemodialysis Intake (ml) 300 ml   Hemodialysis Output (ml) 2300 ml   NET Removed (ml) 2000   Tolerated Treatment Good   Patient Response to Treatment pt tolerated tx well 2000mL removed stasis achieved bruit/thrill noted nurse to nurse report given to floor RN pt/vitals stable upon d/c dialysis unit   Patient Disposition Remain in ICU/ED  (return to ER)

## 2024-08-07 NOTE — ED PROVIDER NOTES
[facility] MED SURG/TELE  EMERGENCY DEPARTMENT ENCOUNTER        Pt Name: Lizette Murray  MRN: [id number]  Birthdate 1955  Date of evaluation: 2024  Provider: Rachel Lal DO  PCP: Lourdes Murrell MD  Note Started: 8:27 PM EDT 24    CHIEF COMPLAINT       No chief complaint on file.      HISTORY OF PRESENT ILLNESS: 1 or more Elements   History From: patient    Limitations to history : None    Lizette Murray is a 69 y.o. female with a history of essential hypertension, pulmonary hypertension, aortic stenosis, CAD, type 2 diabetes, hyperlipidemia presenting to the emergency department complaining of shortness of breath.  Patient was complaining of shortness of breath, diaphoresis, increasing oxygen requirements.  States that she had her dialysis session which was completed and she went home and lost power and her oxygen tank was not working so she called paramedics bring her in for further evaluation.  She states has been experiencing increased shortness of breath since Saturday coming emergency department.  She states that she has not missed any of her dialysis sessions.  She denies any cough, congestion, fever, chills, abdominal pain, chest pain, palpitations, nausea, vomiting or diarrhea, no spoke to anything out of weakness, rehospitalization, recent illness, or other acute symptoms or concerns.    Nursing Notes were all reviewed and agreed with or any disagreements were addressed in the HPI.    REVIEW OF SYSTEMS :      Review of Systems    Positives and Pertinent negatives as per HPI.     SURGICAL HISTORY     Past Surgical History:   Procedure Laterality Date    CARDIAC CATHETERIZATION      Done in [place]    CARDIAC SURGERY       SECTION      COLONOSCOPY      Negative findings    DIALYSIS FISTULA CREATION Left 2022    REVISION AV FISTULA LEFT ARM [place] [facility] [place]    EYE SURGERY Bilateral 2023    INVASIVE VASCULAR N/A      Is this patient to be included in the SEP-1 Core Measure due to severe sepsis or septic shock?   {[id number]}        Medical Decision Making/Differential Diagnosis:    CC/HPI Summary, Social Determinants of health, Records Reviewed, DDx, testing done/not done, ED Course, Reassessment, disposition considerations/shared decision making with patient, consults, disposition:        ***      CONSULTS: (Who and What was discussed)  None  ***      I am the Primary Clinician of Record.    FINAL IMPRESSION    No diagnosis found.      DISPOSITION/PLAN     DISPOSITION        PATIENT REFERRED TO:  No follow-up provider specified.    DISCHARGE MEDICATIONS:  New Prescriptions    No medications on file       DISCONTINUED MEDICATIONS:  Discontinued Medications    No medications on file              (Please note that portions of this note were completed with a voice recognition program.  Efforts were made to edit the dictations but occasionally words are mis-transcribed.)    Rachel Lal DO (electronically signed)

## 2024-08-08 ENCOUNTER — APPOINTMENT (OUTPATIENT)
Dept: GENERAL RADIOLOGY | Age: 69
End: 2024-08-08
Payer: MEDICARE

## 2024-08-08 VITALS
RESPIRATION RATE: 18 BRPM | HEART RATE: 70 BPM | BODY MASS INDEX: 43.02 KG/M2 | HEIGHT: 64 IN | SYSTOLIC BLOOD PRESSURE: 101 MMHG | OXYGEN SATURATION: 100 % | DIASTOLIC BLOOD PRESSURE: 57 MMHG | TEMPERATURE: 98.2 F | WEIGHT: 251.99 LBS

## 2024-08-08 LAB
ANION GAP SERPL CALCULATED.3IONS-SCNC: 16 MMOL/L (ref 7–16)
BASOPHILS # BLD: 0.05 K/UL (ref 0–0.2)
BASOPHILS NFR BLD: 1 % (ref 0–2)
BUN SERPL-MCNC: 37 MG/DL (ref 6–23)
CALCIUM SERPL-MCNC: 9.6 MG/DL (ref 8.6–10.2)
CHLORIDE SERPL-SCNC: 93 MMOL/L (ref 98–107)
CO2 SERPL-SCNC: 28 MMOL/L (ref 22–29)
CREAT SERPL-MCNC: 5.1 MG/DL (ref 0.5–1)
EKG ATRIAL RATE: 80 BPM
EKG P AXIS: -20 DEGREES
EKG P-R INTERVAL: 184 MS
EKG Q-T INTERVAL: 422 MS
EKG QRS DURATION: 94 MS
EKG QTC CALCULATION (BAZETT): 486 MS
EKG R AXIS: -13 DEGREES
EKG T AXIS: 106 DEGREES
EKG VENTRICULAR RATE: 80 BPM
EOSINOPHIL # BLD: 0.58 K/UL (ref 0.05–0.5)
EOSINOPHILS RELATIVE PERCENT: 8 % (ref 0–6)
ERYTHROCYTE [DISTWIDTH] IN BLOOD BY AUTOMATED COUNT: 14.8 % (ref 11.5–15)
GFR, ESTIMATED: 9 ML/MIN/1.73M2
GLUCOSE BLD-MCNC: 116 MG/DL (ref 74–99)
GLUCOSE BLD-MCNC: 189 MG/DL (ref 74–99)
GLUCOSE BLD-MCNC: 273 MG/DL (ref 74–99)
GLUCOSE SERPL-MCNC: 197 MG/DL (ref 74–99)
HCT VFR BLD AUTO: 35.5 % (ref 34–48)
HGB BLD-MCNC: 11.1 G/DL (ref 11.5–15.5)
IMM GRANULOCYTES # BLD AUTO: 0.03 K/UL (ref 0–0.58)
IMM GRANULOCYTES NFR BLD: 0 % (ref 0–5)
LYMPHOCYTES NFR BLD: 1.16 K/UL (ref 1.5–4)
LYMPHOCYTES RELATIVE PERCENT: 15 % (ref 20–42)
MCH RBC QN AUTO: 32.3 PG (ref 26–35)
MCHC RBC AUTO-ENTMCNC: 31.3 G/DL (ref 32–34.5)
MCV RBC AUTO: 103.2 FL (ref 80–99.9)
MONOCYTES NFR BLD: 0.77 K/UL (ref 0.1–0.95)
MONOCYTES NFR BLD: 10 % (ref 2–12)
NEUTROPHILS NFR BLD: 66 % (ref 43–80)
NEUTS SEG NFR BLD: 5.09 K/UL (ref 1.8–7.3)
PHOSPHATE SERPL-MCNC: 5.4 MG/DL (ref 2.5–4.5)
PLATELET # BLD AUTO: 134 K/UL (ref 130–450)
PMV BLD AUTO: 11.4 FL (ref 7–12)
POTASSIUM SERPL-SCNC: 4.2 MMOL/L (ref 3.5–5)
RBC # BLD AUTO: 3.44 M/UL (ref 3.5–5.5)
SODIUM SERPL-SCNC: 137 MMOL/L (ref 132–146)
WBC OTHER # BLD: 7.7 K/UL (ref 4.5–11.5)

## 2024-08-08 PROCEDURE — 6360000002 HC RX W HCPCS: Performed by: INTERNAL MEDICINE

## 2024-08-08 PROCEDURE — 6370000000 HC RX 637 (ALT 250 FOR IP): Performed by: INTERNAL MEDICINE

## 2024-08-08 PROCEDURE — 82962 GLUCOSE BLOOD TEST: CPT

## 2024-08-08 PROCEDURE — P9047 ALBUMIN (HUMAN), 25%, 50ML: HCPCS | Performed by: INTERNAL MEDICINE

## 2024-08-08 PROCEDURE — 85025 COMPLETE CBC W/AUTO DIFF WBC: CPT

## 2024-08-08 PROCEDURE — 99239 HOSP IP/OBS DSCHRG MGMT >30: CPT | Performed by: INTERNAL MEDICINE

## 2024-08-08 PROCEDURE — 84100 ASSAY OF PHOSPHORUS: CPT

## 2024-08-08 PROCEDURE — 2500000003 HC RX 250 WO HCPCS: Performed by: INTERNAL MEDICINE

## 2024-08-08 PROCEDURE — 93010 ELECTROCARDIOGRAM REPORT: CPT | Performed by: INTERNAL MEDICINE

## 2024-08-08 PROCEDURE — 71045 X-RAY EXAM CHEST 1 VIEW: CPT

## 2024-08-08 PROCEDURE — 2700000000 HC OXYGEN THERAPY PER DAY

## 2024-08-08 PROCEDURE — 80048 BASIC METABOLIC PNL TOTAL CA: CPT

## 2024-08-08 PROCEDURE — 90935 HEMODIALYSIS ONE EVALUATION: CPT

## 2024-08-08 RX ORDER — ALBUMIN (HUMAN) 12.5 G/50ML
25 SOLUTION INTRAVENOUS PRN
Status: DISCONTINUED | OUTPATIENT
Start: 2024-08-08 | End: 2024-08-08 | Stop reason: HOSPADM

## 2024-08-08 RX ADMIN — HEPARIN SODIUM 5000 UNITS: 5000 INJECTION INTRAVENOUS; SUBCUTANEOUS at 06:37

## 2024-08-08 RX ADMIN — MIDODRINE HYDROCHLORIDE 10 MG: 5 TABLET ORAL at 07:13

## 2024-08-08 RX ADMIN — ASPIRIN 81 MG CHEWABLE TABLET 81 MG: 81 TABLET CHEWABLE at 12:30

## 2024-08-08 RX ADMIN — ACETAMINOPHEN 650 MG: 325 TABLET ORAL at 12:31

## 2024-08-08 RX ADMIN — INSULIN LISPRO 2 UNITS: 100 INJECTION, SOLUTION INTRAVENOUS; SUBCUTANEOUS at 15:35

## 2024-08-08 RX ADMIN — ATORVASTATIN CALCIUM 40 MG: 40 TABLET, FILM COATED ORAL at 12:30

## 2024-08-08 RX ADMIN — HEPARIN SODIUM 5000 UNITS: 5000 INJECTION INTRAVENOUS; SUBCUTANEOUS at 13:56

## 2024-08-08 RX ADMIN — CALCIUM ACETATE 1334 MG: 667 CAPSULE ORAL at 12:30

## 2024-08-08 RX ADMIN — ALBUMIN (HUMAN) 25 G: 0.25 INJECTION, SOLUTION INTRAVENOUS at 09:53

## 2024-08-08 ASSESSMENT — PAIN DESCRIPTION - LOCATION: LOCATION: ABDOMEN

## 2024-08-08 ASSESSMENT — PAIN SCALES - GENERAL: PAINLEVEL_OUTOF10: 4

## 2024-08-08 ASSESSMENT — PAIN DESCRIPTION - DESCRIPTORS: DESCRIPTORS: CRAMPING;ACHING

## 2024-08-08 NOTE — FLOWSHEET NOTE
08/08/24 1146   Vital Signs   /63   Temp 98.2 °F (36.8 °C)   Pulse 73   Respirations 18   Weight - Scale 114.3 kg (251 lb 15.8 oz)   Weight Method Bed scale   Percent Weight Change -1.18   Post-Hemodialysis Assessment   Post-Treatment Procedures Blood returned;Access bleeding time < 10 minutes   Machine Disinfection Process Acid/Vinegar Clean;Heat Disinfect;Exterior Machine Disinfection   Rinseback Volume (ml) 300 ml   Blood Volume Processed (Liters) 89.8 L   Dialyzer Clearance Moderately streaked   Duration of Treatment (minutes) 240 minutes   Heparin Amount Administered During Treatment (mL) 0 mL   Hemodialysis Intake (ml) 300 ml   Hemodialysis Output (ml) 1600 ml   NET Removed (ml) 1300   Tolerated Treatment Good   Interventions Taken Medication;Ultrafiltration goal decreased;Ultrafiltration stopped   Patient Response to Treatment Tolerated tx, 1L fluid removed, pt became dizzy, nauseous, and cramping during tx, Dr. Jones notified, uf stopped and 25G albumin given, uf turned on when symptoms resolved and UF goal decreased. Pt stated she was crampy at end of tx again.   Bilateral Breath Sounds Diminished   Edema Right lower extremity;Left lower extremity;Generalized   Edema Generalized Trace   RLE Edema Pitting;+1   LLE Edema +1;Pitting   Physician Notified Yes   Time Off 1136   Patient Disposition Return to room   Observations & Evaluations   Level of Consciousness 0   Oriented X 3   Heart Rhythm Regular   Respiratory Quality/Effort Unlabored   O2 Device Nasal cannula   Skin Condition/Temp Dry;Warm     Tolerated  HD 4 hr on 2 k 2.5 ca bath,1L fluid removed, pt became dizzy, nauseous, and cramping during tx, Dr. Jones notified, uf stopped and 25G albumin given, uf turned on when symptoms resolved and UF goal decreased. Pt stated she was crampy at end of tx again. HD needles removed and pressure held until hemostasis achieved, gauze dressing applied and CDI. Pt alert and vitals stable at time of  transport, report to floor RN, remains in care of staff.

## 2024-08-08 NOTE — PROGRESS NOTES
[facility]  MD Sidney Amezcua, MD Sedrick Jones, MD Melissa Jones, CNP   Jaja Echavarria, YOSHI Hope, CNP  Progress Note    8/8/2024    SUBJECTIVE:   8/8: Sitting up in the chair, no acute distress. Feels better, though still feels her breathing is somewhat \"heavy.\" Lungs are clear. Had some cramping on HD that improved with albumin administration, this limited fluid removal to one liter.     PROBLEM LIST:    Principal Problem:    Fluid overload  Active Problems:    ESRD (end stage renal disease) (HCC)  Resolved Problems:    * No resolved hospital problems. *         DIET:    ADULT DIET; Regular; Low Sodium (2 gm); Low Phosphorus (Less than 1000 mg); 1500 ml     MEDS (scheduled):    sodium chloride flush  5-40 mL IntraVENous 2 times per day    heparin (porcine)  5,000 Units SubCUTAneous 3 times per day    insulin glargine  15 Units SubCUTAneous QAM    calcium acetate  2 capsule Oral TID WC    atorvastatin  40 mg Oral QAM    aspirin  81 mg Oral QAM    insulin lispro  0-4 Units SubCUTAneous TID WC    insulin lispro  0-4 Units SubCUTAneous Nightly    midodrine  10 mg Oral Once per day on Tue Thu Sat       MEDS (infusions):   sodium chloride      dextrose         MEDS (prn):  albumin human 25%, sodium chloride flush, sodium chloride, ondansetron **OR** ondansetron, polyethylene glycol, acetaminophen **OR** acetaminophen, midodrine, dextrose bolus **OR** dextrose bolus, glucagon (rDNA), dextrose, Glucose    PHYSICAL EXAM:     Patient Vitals for the past 24 hrs:   BP Temp Temp src Pulse Resp SpO2 Weight   08/08/24 1208 (!) 101/57 -- -- 70 -- 100 % --   08/08/24 1146 129/63 98.2 °F (36.8 °C) -- 73 18 -- 114.3 kg (251 lb 15.8 oz)   08/08/24 0706 (!) 112/49 97.7 °F (36.5 °C) Oral 65 16 95 % --   08/07/24 1840 (!) 105/59 98.1 °F (36.7 °C) Oral 79 20 100 % --   08/07/24 1730 108/62 97.9 °F (36.6 °C) Oral 90 18 97 % --   08/07/24 1716 114/82 97.9 °F (36.6 °C) Oral 78 18 96 % --

## 2024-08-08 NOTE — PLAN OF CARE
Problem: Discharge Planning  Goal: Discharge to home or other facility with appropriate resources  8/8/2024 0225 by Nunu Ritter RN  Outcome: Progressing  8/7/2024 1812 by Liliya Cleaning RN  Outcome: Progressing     Problem: Pain  Goal: Verbalizes/displays adequate comfort level or baseline comfort level  8/8/2024 0225 by Nunu Ritter RN  Outcome: Progressing  8/7/2024 1812 by Liliya Cleaning RN  Outcome: Progressing     Problem: Safety - Adult  Goal: Free from fall injury  8/8/2024 0225 by Nunu Ritter RN  Outcome: Progressing  8/7/2024 1812 by Liliya Cleaning RN  Outcome: Progressing

## 2024-08-08 NOTE — CARE COORDINATION
Initial CM Assessment-Met with patient at the bedside, introduced myself and CM role in care coordination. Patient is from home with her daughter in a one story home. She has a ww and wc. Patient has used [facility] in the past. Home oxygen baseline is 2 liters from [employer]. Dialysis @ [facility] [phone] -TTS at 5:30am. Discharge plan is home, no needs, her daughter Meghann to transport home.    [place] ISRAELN, RN  [facility]

## 2024-08-08 NOTE — CONSULTS
[facility]  MD Sidney Amezcua MD ALI HASSAN MD .  Consultation  Patient's Name: Lizette Murray  9:34 PM  8/7/2024    Nephrologist: Sedrick Jones MD    Reason for Consult:  ESRD   Requesting Physician:  Lourdes Murrell MD    Chief Complaint:  shortness of breath     History of Present Ilness:      68 y/o F presenting with cc of sohortness of breath   Nephrology asked to see for ESRD management   She has PMH of essential hypertension.  Renal disease on hemodialysis diabetes mellitus type 2 anxiety and depression, anemia   She underwent HD today 2 L were removed  She is seen in her room she is sleeping in recliner she is on o2/nc   She has 2+ edema b/l LE     Past Medical History:   Diagnosis Date    Acute on chronic heart failure with preserved ejection fraction ([facility]) 02/07/2023    Acute pulmonary embolism ([facility]) 12/03/2022    Anemia due to stage 5 chronic kidney disease, not on chronic dialysis ([facility]) 02/08/2023    Anemia in CKD (chronic kidney disease) 11/30/2021    Anxiety and depression 01/19/2022    Aortic stenosis, mild 02/08/2023 12/2022    At high risk for falls 01/19/2022    Brain aneurysm     CLABSI (central line-associated bloodstream infection) 11/19/2021    Cough 01/19/2022    Diabetes mellitus ([facility]) 2006    Diabetes mellitus type 2 with complications ([facility]) 11/30/2021    Dizziness on standing 01/19/2022    Encounter regarding vascular access for dialysis for ESRD ([facility]) 07/30/2024    End-stage renal disease on hemodialysis ([facility]) 01/19/2023    ESRD (end stage renal disease) ([facility]) 11/19/2021    ESRD on hemodialysis ([facility]) 11/19/2021    Essential hypertension 11/30/2021    Fever, unspecified     Gastrointestinal hemorrhage 11/30/2021    H/O heart artery stent 2015    Done in [place]    History of Clostridioides difficile colitis 01/19/2022    History of pulmonary embolus (PE) 02/08/2023 12/2022    Hyperlipidemia     Hypertension     Leg swelling 01/19/2023       Lab Results   Component Value Date/Time    PHOS 4.6 08/07/2024 03:16 AM     U/A:    Lab Results   Component Value Date/Time    COLORU Yellow 11/18/2021 03:19 AM    PHUR 6.0 11/18/2021 03:19 AM    WBCUA 1-3 11/18/2021 03:19 AM    RBCUA 0-1 11/18/2021 03:19 AM    BACTERIA FEW 11/18/2021 03:19 AM    CLARITYU Clear 11/18/2021 03:19 AM    LEUKOCYTESUR Negative 11/18/2021 03:19 AM    UROBILINOGEN 0.2 11/18/2021 03:19 AM    BILIRUBINUR Negative 11/18/2021 03:19 AM    BLOODU SMALL 11/18/2021 03:19 AM    GLUCOSEU 500 11/18/2021 03:19 AM     Microalbumen/Creatinine ratio:  No components found for: \"RUCREAT\"  Iron Saturation:  No components found for: \"PERCENTFE\"  TIBC:    Lab Results   Component Value Date/Time    TIBC 170 02/22/2024 05:27 AM     FERRITIN:    Lab Results   Component Value Date/Time    FERRITIN 2,598 05/09/2023 02:45 AM        Imaging:  XR CHEST PORTABLE   Final Result   CHF.             Assessment    1.  ESRD on HD TTS at [facility]   2.  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  Hyperkalemia due to missing HD treatment  5.  Edema, chronic  6   Heart failure      Plan      -Hemodialysis today for solute and volume management      -Hemodialysis again in am per her TTS schedule .will try profile 2 on dialysis to minimize cramping      -continue phoslo tid with meals     -midodrine 10 mg prior to HD .     -fluid and salt restricted diet      -continue supportive care      Thank you     Electronically signed by Sedrick Jones MD on 8/7/2024 at 9:34 PM

## 2024-08-08 NOTE — DISCHARGE SUMMARY
[facility] Hospitalist Physician Discharge Summary       No follow-up provider specified.    Activity level: As tolerated     Dispo: Home      Condition on discharge: Stable     Patient ID:  Lizette Murray  [id number]  69 y.o.  1955    Admit date: 8/6/2024    Discharge date and time:  8/8/2024  3:25 PM    Admission Diagnoses: Principal Problem:    Fluid overload  Active Problems:    ESRD (end stage renal disease) (HCC)  Resolved Problems:    * No resolved hospital problems. *      Discharge Diagnoses: Principal Problem:    Fluid overload  Active Problems:    ESRD (end stage renal disease) (HCC)  Resolved Problems:    * No resolved hospital problems. *      Consults:  IP CONSULT TO NEPHROLOGY    Procedures: None    Hospital Course:   Patient Lizette Murray is a 69 y.o. presented with ESRD (end stage renal disease) (HCC) [N18.6]  ESRD on dialysis (HCC) [N18.6, Z99.2]  Fluid overload [E87.70]  Hypervolemia, unspecified hypervolemia type [E87.70]    This is a 69-year-old female presents to the hospital due to missed dialysis.  She was seen by nephrology.  Patient did receive dialysis on the day admission and the following day.  Currently she is asymptomatic and medically stable for discharge.    Discharge Exam:    General Appearance: alert and oriented to person, place and time and in no acute distress  Skin: warm and dry  Head: normocephalic and atraumatic  Eyes: pupils equal, round, extraocular eye movements intact, conjunctivae normal  Pulmonary/Chest: clear to auscultation bilaterally- no wheezes, rales or rhonchi, normal air movement, no respiratory distress  Cardiovascular: normal rate, normal S1 and S2   Abdomen: soft, non-tender, non-distended, normal bowel sounds,   Extremities: no cyanosis, no clubbing and no edema  Neurologic: no cranial nerve deficit and speech normal      I/O last 3 completed shifts:  In: 300   Out: 2300   I/O this shift:  In: 300   Out: 1600       LABS:  Recent Labs      36 minutes was spent in preparing discharge papers, discussing discharge with patient, medication review, etc.    Signed:  Electronically signed by Cindy Ortega DO on 8/8/2024 at 3:25 PM

## 2024-08-08 NOTE — PLAN OF CARE
Problem: Pain  Goal: Verbalizes/displays adequate comfort level or baseline comfort level  8/8/2024 1151 by Liliya Cleaning RN  Outcome: Progressing  8/8/2024 0225 by Nunu Ritter RN  Outcome: Progressing     Problem: Safety - Adult  Goal: Free from fall injury  8/8/2024 1151 by Liliya Cleaning RN  Outcome: Progressing  8/8/2024 0225 by Nunu Ritter RN  Outcome: Progressing     Problem: Chronic Conditions and Co-morbidities  Goal: Patient's chronic conditions and co-morbidity symptoms are monitored and maintained or improved  Outcome: Progressing

## 2024-08-08 NOTE — PROGRESS NOTES
Physical Therapy  Facility/Department: [address] MED SURG/TELE      Name: Lizette Murray  : 1955  MRN: [id number]    Chart reviewed and PT mehreen attempted this am.  Pt out of the room.  Will check back at later time/date.     Nargis Almanza, PT [id number]

## 2024-08-08 NOTE — PROGRESS NOTES
Page sent to nephro with cxr results    Electronically signed by Mary Herrera RN on 8/8/2024 at 4:05 PM

## 2024-08-08 NOTE — ACP (ADVANCE CARE PLANNING)
Advance Care Planning   Healthcare Decision Maker:    Primary Decision Maker: TrevorMeghann - Child - [phone]    Secondary Decision Maker: TrevorWang - Child - [phone]    Click here to complete Healthcare Decision Makers including selection of the Healthcare Decision Maker Relationship (ie \"Primary\").

## 2024-08-10 NOTE — PROGRESS NOTES
Physician Progress Note      PATIENT:               RADHA ARANDA  CSN #:                  [id number]  :                       1955  ADMIT DATE:       2024 8:09 PM  DISCH DATE:        2024 5:30 PM  RESPONDING  PROVIDER #:        Cindy Ortega DO          QUERY TEXT:    Pt admitted with ESRD, hyperkalemia and volume overload.  Chronic respiratory   failure noted per H&P. If possible, please document in the progress notes and   discharge summary if you are evaluating and/or treating any of the following:    The medical record reflects the following:  Risk Factors: CHF, ESRD on HD, obesity  Clinical Indicators: H&P \"Chronic respiratory failure\" \"Baseline 4 L nasal   cannula oxygen, denies chest pain, SOB\".  Treatment: supplemental O2    Thank you, Umu Fish RN BSN [facility]  [phone]  Options provided:  -- Chronic respiratory failure with hypoxia  -- Chronic respiratory failure with hypercapnia  -- Chronic respiratory failure with hypoxia and hypercapnia  -- Other - I will add my own diagnosis  -- Disagree - Not applicable / Not valid  -- Disagree - Clinically unable to determine / Unknown  -- Refer to Clinical Documentation Reviewer    PROVIDER RESPONSE TEXT:    Provider is clinically unable to determine a response to this query.    Query created by: Tunde Fish on 2024 1:20 PM      Electronically signed by:  Cindy Ortega DO 8/10/2024 1:50 PM

## 2024-08-12 ENCOUNTER — TELEPHONE (OUTPATIENT)
Dept: PRIMARY CARE CLINIC | Age: 69
End: 2024-08-12

## 2024-08-12 ENCOUNTER — TELEPHONE (OUTPATIENT)
Dept: INFUSION THERAPY | Age: 69
End: 2024-08-12

## 2024-08-12 NOTE — TELEPHONE ENCOUNTER
Care Transitions Initial Follow Up Call    Outreach made within 2 business days of discharge: Yes    Patient: Lizette Murray Patient : 1955   MRN: [id number]  Reason for Admission: Fluid overload  Discharge Date: 24       Spoke with: Patient    Discharge department/facility: [facility] Interactive Patient Contact:  Was patient able to fill all prescriptions: Yes  Was patient instructed to bring all medications to the follow-up visit: Yes  Is patient taking all medications as directed in the discharge summary? Yes  Does patient understand their discharge instructions: Yes  Does patient have questions or concerns that need addressed prior to 7-14 day follow up office visit: no    Additional needs identified to be addressed with provider  No needs identified             Scheduled appointment with PCP within 7-14 days    Follow Up  Future Appointments   Date Time Provider Department Center   2024  2:00 PM [facility] MED ONC FAST TRACK 1 [facility] Med Onc [facility]   2024  2:15 PM Ina Crum MD MED ONC [facility]   8/15/2024 11:00 AM Lourdes Murrell MD [facility] DEP   10/29/2024  3:45 PM Jim Acosta MD [facility]   2/3/2025  9:45 AM Eber Henry MD VAS/MED [facility]   4/15/2025  1:00 PM Lourdes Murrell MD [facility] DEP       Kerri Avelar MA

## 2024-08-12 NOTE — TELEPHONE ENCOUNTER
PT LVM TO CANCEL 8/13 APPT. RETURNED PT CALL AND SHE STATED SHE NO LONGER NEEDS TO FOLLOW W/DR TOBY DUTTA AS SHE IS SEEING THE PULMONOLOGIST FOR THIS. I LET PT KNOW I WOULD SPEAK TO NATALIA OR DR TOBY DUTTA AND CALL HER BACK IF DR TOBY DUTTA WOULD LIKE TO SEE HER. PT AGREEABLE TO THIS.

## 2024-08-13 ENCOUNTER — HOSPITAL ENCOUNTER (OUTPATIENT)
Dept: INFUSION THERAPY | Age: 69
Discharge: HOME | End: 2024-08-13

## 2024-08-15 ENCOUNTER — OFFICE VISIT (OUTPATIENT)
Dept: PRIMARY CARE CLINIC | Age: 69
End: 2024-08-15

## 2024-08-15 VITALS
WEIGHT: 261 LBS | TEMPERATURE: 97 F | HEIGHT: 64 IN | RESPIRATION RATE: 20 BRPM | DIASTOLIC BLOOD PRESSURE: 62 MMHG | SYSTOLIC BLOOD PRESSURE: 92 MMHG | BODY MASS INDEX: 44.56 KG/M2 | HEART RATE: 64 BPM | OXYGEN SATURATION: 99 %

## 2024-08-15 DIAGNOSIS — E78.2 MIXED HYPERLIPIDEMIA: ICD-10-CM

## 2024-08-15 DIAGNOSIS — L02.419 CELLULITIS AND ABSCESS OF LEG: ICD-10-CM

## 2024-08-15 DIAGNOSIS — I27.20 MILD PULMONARY HYPERTENSION (HCC): ICD-10-CM

## 2024-08-15 DIAGNOSIS — E11.22 TYPE 2 DIABETES MELLITUS WITH STAGE 5 CHRONIC KIDNEY DISEASE NOT ON CHRONIC DIALYSIS, WITH LONG-TERM CURRENT USE OF INSULIN (HCC): Primary | ICD-10-CM

## 2024-08-15 DIAGNOSIS — I25.118 CORONARY ARTERY DISEASE OF NATIVE ARTERY OF NATIVE HEART WITH STABLE ANGINA PECTORIS (HCC): ICD-10-CM

## 2024-08-15 DIAGNOSIS — J96.11 CHRONIC RESPIRATORY FAILURE WITH HYPOXIA (HCC): ICD-10-CM

## 2024-08-15 DIAGNOSIS — N18.5 TYPE 2 DIABETES MELLITUS WITH STAGE 5 CHRONIC KIDNEY DISEASE NOT ON CHRONIC DIALYSIS, WITH LONG-TERM CURRENT USE OF INSULIN (HCC): Primary | ICD-10-CM

## 2024-08-15 DIAGNOSIS — Z79.4 TYPE 2 DIABETES MELLITUS WITH STAGE 5 CHRONIC KIDNEY DISEASE NOT ON CHRONIC DIALYSIS, WITH LONG-TERM CURRENT USE OF INSULIN (HCC): Primary | ICD-10-CM

## 2024-08-15 DIAGNOSIS — L03.119 CELLULITIS AND ABSCESS OF LEG: ICD-10-CM

## 2024-08-15 PROBLEM — D69.6 THROMBOCYTOPENIA, UNSPECIFIED (HCC): Status: ACTIVE | Noted: 2024-08-15

## 2024-08-15 PROBLEM — D69.6 THROMBOCYTOPENIA, UNSPECIFIED (HCC): Status: RESOLVED | Noted: 2024-08-15 | Resolved: 2024-08-15

## 2024-08-15 RX ORDER — BLOOD-GLUCOSE METER
KIT MISCELLANEOUS
Qty: 300 STRIP | Refills: 5 | Status: SHIPPED | OUTPATIENT
Start: 2024-08-15

## 2024-08-15 RX ORDER — CEPHALEXIN 250 MG/1
250 CAPSULE ORAL 4 TIMES DAILY
Qty: 28 CAPSULE | Refills: 0 | Status: SHIPPED | OUTPATIENT
Start: 2024-08-15

## 2024-08-15 RX ORDER — PEN NEEDLE, DIABETIC 29 G X1/2"
1 NEEDLE, DISPOSABLE MISCELLANEOUS 3 TIMES DAILY
Qty: 300 EACH | Refills: 3 | Status: SHIPPED | OUTPATIENT
Start: 2024-08-15

## 2024-08-15 SDOH — ECONOMIC STABILITY: INCOME INSECURITY: HOW HARD IS IT FOR YOU TO PAY FOR THE VERY BASICS LIKE FOOD, HOUSING, MEDICAL CARE, AND HEATING?: NOT HARD AT ALL

## 2024-08-15 SDOH — ECONOMIC STABILITY: FOOD INSECURITY: WITHIN THE PAST 12 MONTHS, THE FOOD YOU BOUGHT JUST DIDN'T LAST AND YOU DIDN'T HAVE MONEY TO GET MORE.: NEVER TRUE

## 2024-08-15 SDOH — ECONOMIC STABILITY: FOOD INSECURITY: WITHIN THE PAST 12 MONTHS, YOU WORRIED THAT YOUR FOOD WOULD RUN OUT BEFORE YOU GOT MONEY TO BUY MORE.: NEVER TRUE

## 2024-08-15 ASSESSMENT — ENCOUNTER SYMPTOMS
VOMITING: 0
NAUSEA: 0
VOICE CHANGE: 0
SHORTNESS OF BREATH: 0
ABDOMINAL PAIN: 0
CHEST TIGHTNESS: 0
WHEEZING: 0
SORE THROAT: 0

## 2024-08-15 NOTE — PROGRESS NOTES
HPI:  Patient comes in today for follow-up after hospital discharge patient was admitted to the hospital with hyperkalemia potassium was 5.6 even though she had dialysis the same day she was at the hospital, patient took herself to the hospital because she was without power and without oxygen, nephrology decided to keep the patient in the hospital due to the hyperkalemia she had dialysis daily 3 days in a row potassium was normalized and patient was discharged 8 8, she has a problem today of open wounds in both lower extremities her dog jumped on her and scratched her.  She has redness and some swelling..    Review of Systems   Constitutional:  Negative for chills, fever and unexpected weight change.   HENT:  Negative for postnasal drip, sore throat and voice change.    Respiratory:  Negative for chest tightness, shortness of breath and wheezing.    Cardiovascular:  Positive for leg swelling. Negative for chest pain.   Gastrointestinal:  Negative for abdominal pain, nausea and vomiting.   Musculoskeletal:  Positive for gait problem. Negative for joint swelling.   Skin:  Positive for wound. Negative for rash.   Psychiatric/Behavioral: Negative.          Past Medical History:   Diagnosis Date    Acute on chronic heart failure with preserved ejection fraction ([facility]) 02/07/2023    Acute pulmonary embolism ([facility]) 12/03/2022    Anemia due to stage 5 chronic kidney disease, not on chronic dialysis ([facility]) 02/08/2023    Anemia in CKD (chronic kidney disease) 11/30/2021    Anxiety and depression 01/19/2022    Aortic stenosis, mild 02/08/2023 12/2022    At high risk for falls 01/19/2022    Brain aneurysm     CLABSI (central line-associated bloodstream infection) 11/19/2021    Cough 01/19/2022    Diabetes mellitus ([facility]) 2006    Diabetes mellitus type 2 with complications ([facility]) 11/30/2021    Dizziness on standing 01/19/2022    Encounter regarding vascular access for dialysis for ESRD ([facility]) 07/30/2024    End-stage renal disease

## 2024-09-24 ENCOUNTER — APPOINTMENT (OUTPATIENT)
Dept: GENERAL RADIOLOGY | Age: 69
End: 2024-09-24
Payer: MEDICARE

## 2024-09-24 ENCOUNTER — APPOINTMENT (OUTPATIENT)
Dept: ULTRASOUND IMAGING | Age: 69
End: 2024-09-24
Payer: MEDICARE

## 2024-09-24 ENCOUNTER — HOSPITAL ENCOUNTER (INPATIENT)
Age: 69
LOS: 3 days | Discharge: HOME | End: 2024-09-28
Attending: EMERGENCY MEDICINE | Admitting: HOSPITALIST
Payer: MEDICARE

## 2024-09-24 ENCOUNTER — APPOINTMENT (OUTPATIENT)
Dept: CT IMAGING | Age: 69
End: 2024-09-24
Payer: MEDICARE

## 2024-09-24 DIAGNOSIS — I50.20 SYSTOLIC CONGESTIVE HEART FAILURE, UNSPECIFIED HF CHRONICITY (HCC): ICD-10-CM

## 2024-09-24 DIAGNOSIS — L08.9 DIABETIC FOOT INFECTION (HCC): ICD-10-CM

## 2024-09-24 DIAGNOSIS — E87.70 HYPERVOLEMIA, UNSPECIFIED HYPERVOLEMIA TYPE: Primary | ICD-10-CM

## 2024-09-24 DIAGNOSIS — R06.02 SHORTNESS OF BREATH: ICD-10-CM

## 2024-09-24 DIAGNOSIS — I73.9 PVD (PERIPHERAL VASCULAR DISEASE) (HCC): ICD-10-CM

## 2024-09-24 DIAGNOSIS — E11.628 DIABETIC FOOT INFECTION (HCC): ICD-10-CM

## 2024-09-24 DIAGNOSIS — E87.5 HYPERKALEMIA: ICD-10-CM

## 2024-09-24 LAB
ALBUMIN SERPL-MCNC: 3.8 G/DL (ref 3.5–5.2)
ALP SERPL-CCNC: 77 U/L (ref 35–104)
ALT SERPL-CCNC: 11 U/L (ref 0–32)
ANION GAP SERPL CALCULATED.3IONS-SCNC: 19 MMOL/L (ref 7–16)
AST SERPL-CCNC: 22 U/L (ref 0–31)
BASOPHILS # BLD: 0.04 K/UL (ref 0–0.2)
BASOPHILS NFR BLD: 1 % (ref 0–2)
BILIRUB SERPL-MCNC: 0.3 MG/DL (ref 0–1.2)
BUN SERPL-MCNC: 80 MG/DL (ref 6–23)
CALCIUM SERPL-MCNC: 9.1 MG/DL (ref 8.6–10.2)
CHLORIDE SERPL-SCNC: 89 MMOL/L (ref 98–107)
CO2 SERPL-SCNC: 26 MMOL/L (ref 22–29)
CREAT SERPL-MCNC: 8.7 MG/DL (ref 0.5–1)
EOSINOPHIL # BLD: 0.08 K/UL (ref 0.05–0.5)
EOSINOPHILS RELATIVE PERCENT: 1 % (ref 0–6)
ERYTHROCYTE [DISTWIDTH] IN BLOOD BY AUTOMATED COUNT: 15.5 % (ref 11.5–15)
GFR, ESTIMATED: 5 ML/MIN/1.73M2
GLUCOSE SERPL-MCNC: 130 MG/DL (ref 74–99)
HCT VFR BLD AUTO: 32.5 % (ref 34–48)
HGB BLD-MCNC: 10.4 G/DL (ref 11.5–15.5)
IMM GRANULOCYTES # BLD AUTO: 0.03 K/UL (ref 0–0.58)
IMM GRANULOCYTES NFR BLD: 0 % (ref 0–5)
LYMPHOCYTES NFR BLD: 1.2 K/UL (ref 1.5–4)
LYMPHOCYTES RELATIVE PERCENT: 16 % (ref 20–42)
MCH RBC QN AUTO: 32.5 PG (ref 26–35)
MCHC RBC AUTO-ENTMCNC: 32 G/DL (ref 32–34.5)
MCV RBC AUTO: 101.6 FL (ref 80–99.9)
MONOCYTES NFR BLD: 0.58 K/UL (ref 0.1–0.95)
MONOCYTES NFR BLD: 8 % (ref 2–12)
NEUTROPHILS NFR BLD: 74 % (ref 43–80)
NEUTS SEG NFR BLD: 5.5 K/UL (ref 1.8–7.3)
PLATELET, FLUORESCENCE: 136 K/UL (ref 130–450)
PMV BLD AUTO: 11.4 FL (ref 7–12)
POTASSIUM SERPL-SCNC: 5.5 MMOL/L (ref 3.5–5)
PROT SERPL-MCNC: 6.7 G/DL (ref 6.4–8.3)
RBC # BLD AUTO: 3.2 M/UL (ref 3.5–5.5)
SARS-COV-2 RDRP RESP QL NAA+PROBE: NOT DETECTED
SODIUM SERPL-SCNC: 134 MMOL/L (ref 132–146)
SPECIMEN DESCRIPTION: NORMAL
TROPONIN I SERPL HS-MCNC: 72 NG/L (ref 0–9)
WBC OTHER # BLD: 7.4 K/UL (ref 4.5–11.5)

## 2024-09-24 PROCEDURE — 87635 SARS-COV-2 COVID-19 AMP PRB: CPT

## 2024-09-24 PROCEDURE — 71046 X-RAY EXAM CHEST 2 VIEWS: CPT

## 2024-09-24 PROCEDURE — 84484 ASSAY OF TROPONIN QUANT: CPT

## 2024-09-24 PROCEDURE — 71275 CT ANGIOGRAPHY CHEST: CPT

## 2024-09-24 PROCEDURE — 6360000004 HC RX CONTRAST MEDICATION: Performed by: RADIOLOGY

## 2024-09-24 PROCEDURE — 93971 EXTREMITY STUDY: CPT

## 2024-09-24 PROCEDURE — 85025 COMPLETE CBC W/AUTO DIFF WBC: CPT

## 2024-09-24 PROCEDURE — 93005 ELECTROCARDIOGRAM TRACING: CPT

## 2024-09-24 PROCEDURE — 99285 EMERGENCY DEPT VISIT HI MDM: CPT

## 2024-09-24 PROCEDURE — 80053 COMPREHEN METABOLIC PANEL: CPT

## 2024-09-24 RX ORDER — IOPAMIDOL 755 MG/ML
75 INJECTION, SOLUTION INTRAVASCULAR
Status: COMPLETED | OUTPATIENT
Start: 2024-09-24 | End: 2024-09-24

## 2024-09-24 RX ORDER — IPRATROPIUM BROMIDE AND ALBUTEROL SULFATE 2.5; .5 MG/3ML; MG/3ML
3 SOLUTION RESPIRATORY (INHALATION) ONCE
Status: COMPLETED | OUTPATIENT
Start: 2024-09-24 | End: 2024-09-25

## 2024-09-24 RX ADMIN — IOPAMIDOL 75 ML: 755 INJECTION, SOLUTION INTRAVENOUS at 23:08

## 2024-09-24 ASSESSMENT — PAIN - FUNCTIONAL ASSESSMENT: PAIN_FUNCTIONAL_ASSESSMENT: NONE - DENIES PAIN

## 2024-09-24 NOTE — ED TRIAGE NOTES
Department of Emergency Medicine  FIRST PROVIDER TRIAGE NOTE             Independent MLP           9/24/24  2:02 PM EDT    Date of Encounter: 9/24/24   MRN: [id number]      HPI: Lizette Murray is a 69 y.o. female who presents to the ED for Shortness of Breath (Pt arrived at 81% only because she didn't wear home oxygen on the way here. Wears 3.5 liters at home. )       ROS: Negative for fever, vomiting, diarrhea, Suicidal ideation, or Homicidal Ideation.    PE: Gen Appearance/Constitutional: alert  CV: regular rate  Pulm: CTA bilat     Initial Plan of Care: All treatment areas with department are currently occupied. Plan to order/Initiate the following while awaiting opening in ED: EKG.  Initiate Treatment-Testing, Proceed toTreatment Area When Bed Available for ED Attending/MLP to Continue Care    Electronically signed by TAWNYA Earl - CASH   DD: 9/24/24

## 2024-09-25 ENCOUNTER — APPOINTMENT (OUTPATIENT)
Dept: GENERAL RADIOLOGY | Age: 69
End: 2024-09-25
Payer: MEDICARE

## 2024-09-25 PROBLEM — E11.621 DIABETIC ULCER OF LEFT GREAT TOE (HCC): Status: ACTIVE | Noted: 2024-09-25

## 2024-09-25 PROBLEM — L97.529 DIABETIC ULCER OF LEFT GREAT TOE (HCC): Status: ACTIVE | Noted: 2024-09-25

## 2024-09-25 PROBLEM — L08.9 WOUND INFECTION: Status: ACTIVE | Noted: 2024-09-25

## 2024-09-25 PROBLEM — T07.XXXA MULTIPLE OPEN WOUNDS: Status: ACTIVE | Noted: 2024-09-25

## 2024-09-25 PROBLEM — T14.8XXA WOUND INFECTION: Status: ACTIVE | Noted: 2024-09-25

## 2024-09-25 LAB
ALBUMIN SERPL-MCNC: 3.7 G/DL (ref 3.5–5.2)
ALP SERPL-CCNC: 69 U/L (ref 35–104)
ALT SERPL-CCNC: 10 U/L (ref 0–32)
ANION GAP SERPL CALCULATED.3IONS-SCNC: 21 MMOL/L (ref 7–16)
AST SERPL-CCNC: 16 U/L (ref 0–31)
BILIRUB SERPL-MCNC: 0.3 MG/DL (ref 0–1.2)
BUN SERPL-MCNC: 84 MG/DL (ref 6–23)
CALCIUM SERPL-MCNC: 8.6 MG/DL (ref 8.6–10.2)
CHLORIDE SERPL-SCNC: 91 MMOL/L (ref 98–107)
CO2 SERPL-SCNC: 25 MMOL/L (ref 22–29)
CREAT SERPL-MCNC: 9.4 MG/DL (ref 0.5–1)
CRP SERPL HS-MCNC: 60 MG/L (ref 0–5)
EKG ATRIAL RATE: 69 BPM
EKG P AXIS: 63 DEGREES
EKG P-R INTERVAL: 160 MS
EKG Q-T INTERVAL: 472 MS
EKG QRS DURATION: 86 MS
EKG QTC CALCULATION (BAZETT): 505 MS
EKG R AXIS: -16 DEGREES
EKG T AXIS: 118 DEGREES
EKG VENTRICULAR RATE: 69 BPM
ERYTHROCYTE [SEDIMENTATION RATE] IN BLOOD BY WESTERGREN METHOD: 50 MM/HR (ref 0–20)
GFR, ESTIMATED: 4 ML/MIN/1.73M2
GLUCOSE BLD-MCNC: 126 MG/DL (ref 74–99)
GLUCOSE BLD-MCNC: 138 MG/DL (ref 74–99)
GLUCOSE BLD-MCNC: 364 MG/DL (ref 74–99)
GLUCOSE SERPL-MCNC: 117 MG/DL (ref 74–99)
POTASSIUM SERPL-SCNC: 5.3 MMOL/L (ref 3.5–5)
PROT SERPL-MCNC: 6.2 G/DL (ref 6.4–8.3)
SODIUM SERPL-SCNC: 137 MMOL/L (ref 132–146)

## 2024-09-25 PROCEDURE — 2580000003 HC RX 258: Performed by: NURSE PRACTITIONER

## 2024-09-25 PROCEDURE — 6370000000 HC RX 637 (ALT 250 FOR IP): Performed by: EMERGENCY MEDICINE

## 2024-09-25 PROCEDURE — 96367 TX/PROPH/DG ADDL SEQ IV INF: CPT

## 2024-09-25 PROCEDURE — 85652 RBC SED RATE AUTOMATED: CPT

## 2024-09-25 PROCEDURE — 2500000003 HC RX 250 WO HCPCS: Performed by: NURSE PRACTITIONER

## 2024-09-25 PROCEDURE — 90935 HEMODIALYSIS ONE EVALUATION: CPT

## 2024-09-25 PROCEDURE — 6360000002 HC RX W HCPCS: Performed by: NURSE PRACTITIONER

## 2024-09-25 PROCEDURE — 1200000000 HC SEMI PRIVATE

## 2024-09-25 PROCEDURE — 93010 ELECTROCARDIOGRAM REPORT: CPT | Performed by: INTERNAL MEDICINE

## 2024-09-25 PROCEDURE — 6370000000 HC RX 637 (ALT 250 FOR IP): Performed by: NURSE PRACTITIONER

## 2024-09-25 PROCEDURE — 5A1D70Z PERFORMANCE OF URINARY FILTRATION, INTERMITTENT, LESS THAN 6 HOURS PER DAY: ICD-10-PCS | Performed by: INTERNAL MEDICINE

## 2024-09-25 PROCEDURE — 87205 SMEAR GRAM STAIN: CPT

## 2024-09-25 PROCEDURE — NBSRV NON-BILLABLE SERVICE: Performed by: INTERNAL MEDICINE

## 2024-09-25 PROCEDURE — 6360000002 HC RX W HCPCS: Performed by: EMERGENCY MEDICINE

## 2024-09-25 PROCEDURE — 82962 GLUCOSE BLOOD TEST: CPT

## 2024-09-25 PROCEDURE — 80053 COMPREHEN METABOLIC PANEL: CPT

## 2024-09-25 PROCEDURE — 94640 AIRWAY INHALATION TREATMENT: CPT

## 2024-09-25 PROCEDURE — 94664 DEMO&/EVAL PT USE INHALER: CPT

## 2024-09-25 PROCEDURE — APPSS45 APP SPLIT SHARED TIME 31-45 MINUTES: Performed by: NURSE PRACTITIONER

## 2024-09-25 PROCEDURE — 87070 CULTURE OTHR SPECIMN AEROBIC: CPT

## 2024-09-25 PROCEDURE — 96365 THER/PROPH/DIAG IV INF INIT: CPT

## 2024-09-25 PROCEDURE — 86140 C-REACTIVE PROTEIN: CPT

## 2024-09-25 PROCEDURE — 2580000003 HC RX 258: Performed by: EMERGENCY MEDICINE

## 2024-09-25 PROCEDURE — 73630 X-RAY EXAM OF FOOT: CPT

## 2024-09-25 PROCEDURE — 87077 CULTURE AEROBIC IDENTIFY: CPT

## 2024-09-25 RX ORDER — SODIUM CHLORIDE 9 MG/ML
INJECTION, SOLUTION INTRAVENOUS PRN
Status: DISCONTINUED | OUTPATIENT
Start: 2024-09-25 | End: 2024-09-28 | Stop reason: HOSPADM

## 2024-09-25 RX ORDER — PROCHLORPERAZINE MALEATE 10 MG
10 TABLET ORAL EVERY 8 HOURS PRN
Status: DISCONTINUED | OUTPATIENT
Start: 2024-09-25 | End: 2024-09-28 | Stop reason: HOSPADM

## 2024-09-25 RX ORDER — ACETAMINOPHEN 650 MG/1
650 SUPPOSITORY RECTAL EVERY 6 HOURS PRN
Status: DISCONTINUED | OUTPATIENT
Start: 2024-09-25 | End: 2024-09-28 | Stop reason: HOSPADM

## 2024-09-25 RX ORDER — ONDANSETRON 2 MG/ML
4 INJECTION INTRAMUSCULAR; INTRAVENOUS EVERY 6 HOURS PRN
Status: DISCONTINUED | OUTPATIENT
Start: 2024-09-25 | End: 2024-09-25

## 2024-09-25 RX ORDER — ATORVASTATIN CALCIUM 40 MG/1
40 TABLET, FILM COATED ORAL EVERY MORNING
Status: DISCONTINUED | OUTPATIENT
Start: 2024-09-25 | End: 2024-09-28 | Stop reason: HOSPADM

## 2024-09-25 RX ORDER — PROCHLORPERAZINE EDISYLATE 5 MG/ML
10 INJECTION INTRAMUSCULAR; INTRAVENOUS EVERY 6 HOURS PRN
Status: DISCONTINUED | OUTPATIENT
Start: 2024-09-25 | End: 2024-09-28 | Stop reason: HOSPADM

## 2024-09-25 RX ORDER — ACETAMINOPHEN 325 MG/1
650 TABLET ORAL EVERY 6 HOURS PRN
Status: DISCONTINUED | OUTPATIENT
Start: 2024-09-25 | End: 2024-09-28 | Stop reason: HOSPADM

## 2024-09-25 RX ORDER — HEPARIN SODIUM 5000 [USP'U]/ML
5000 INJECTION, SOLUTION INTRAVENOUS; SUBCUTANEOUS EVERY 8 HOURS SCHEDULED
Status: DISCONTINUED | OUTPATIENT
Start: 2024-09-25 | End: 2024-09-28 | Stop reason: HOSPADM

## 2024-09-25 RX ORDER — INSULIN LISPRO 100 [IU]/ML
0-4 INJECTION, SOLUTION INTRAVENOUS; SUBCUTANEOUS NIGHTLY
Status: DISCONTINUED | OUTPATIENT
Start: 2024-09-25 | End: 2024-09-28 | Stop reason: HOSPADM

## 2024-09-25 RX ORDER — INSULIN GLARGINE 100 [IU]/ML
15 INJECTION, SOLUTION SUBCUTANEOUS EVERY MORNING
Status: DISCONTINUED | OUTPATIENT
Start: 2024-09-25 | End: 2024-09-28 | Stop reason: HOSPADM

## 2024-09-25 RX ORDER — DIPHENHYDRAMINE HCL 25 MG
25 TABLET ORAL EVERY 6 HOURS PRN
Status: DISCONTINUED | OUTPATIENT
Start: 2024-09-25 | End: 2024-09-28 | Stop reason: HOSPADM

## 2024-09-25 RX ORDER — SODIUM CHLORIDE 0.9 % (FLUSH) 0.9 %
5-40 SYRINGE (ML) INJECTION EVERY 12 HOURS SCHEDULED
Status: DISCONTINUED | OUTPATIENT
Start: 2024-09-25 | End: 2024-09-28 | Stop reason: HOSPADM

## 2024-09-25 RX ORDER — SODIUM CHLORIDE 0.9 % (FLUSH) 0.9 %
5-40 SYRINGE (ML) INJECTION PRN
Status: DISCONTINUED | OUTPATIENT
Start: 2024-09-25 | End: 2024-09-28 | Stop reason: HOSPADM

## 2024-09-25 RX ORDER — ONDANSETRON 4 MG/1
4 TABLET, ORALLY DISINTEGRATING ORAL EVERY 8 HOURS PRN
Status: DISCONTINUED | OUTPATIENT
Start: 2024-09-25 | End: 2024-09-25

## 2024-09-25 RX ORDER — POLYETHYLENE GLYCOL 3350 17 G/17G
17 POWDER, FOR SOLUTION ORAL DAILY PRN
Status: DISCONTINUED | OUTPATIENT
Start: 2024-09-25 | End: 2024-09-28 | Stop reason: HOSPADM

## 2024-09-25 RX ORDER — CALCIUM ACETATE 667 MG/1
2 CAPSULE ORAL
Status: DISCONTINUED | OUTPATIENT
Start: 2024-09-25 | End: 2024-09-28 | Stop reason: HOSPADM

## 2024-09-25 RX ORDER — INSULIN LISPRO 100 [IU]/ML
4 INJECTION, SOLUTION INTRAVENOUS; SUBCUTANEOUS ONCE
Status: COMPLETED | OUTPATIENT
Start: 2024-09-26 | End: 2024-09-25

## 2024-09-25 RX ORDER — INSULIN LISPRO 100 [IU]/ML
0-4 INJECTION, SOLUTION INTRAVENOUS; SUBCUTANEOUS
Status: DISCONTINUED | OUTPATIENT
Start: 2024-09-25 | End: 2024-09-28 | Stop reason: HOSPADM

## 2024-09-25 RX ORDER — MIDODRINE HYDROCHLORIDE 5 MG/1
10 TABLET ORAL 2 TIMES DAILY PRN
Status: DISCONTINUED | OUTPATIENT
Start: 2024-09-25 | End: 2024-09-28 | Stop reason: HOSPADM

## 2024-09-25 RX ADMIN — CEFEPIME 1000 MG: 1 INJECTION, POWDER, FOR SOLUTION INTRAMUSCULAR; INTRAVENOUS at 01:11

## 2024-09-25 RX ADMIN — DIPHENHYDRAMINE HCL 25 MG: 25 TABLET ORAL at 23:42

## 2024-09-25 RX ADMIN — HEPARIN SODIUM 5000 UNITS: 5000 INJECTION INTRAVENOUS; SUBCUTANEOUS at 08:17

## 2024-09-25 RX ADMIN — INSULIN LISPRO 4 UNITS: 100 INJECTION, SOLUTION INTRAVENOUS; SUBCUTANEOUS at 23:42

## 2024-09-25 RX ADMIN — VANCOMYCIN HYDROCHLORIDE 750 MG: 1 INJECTION, POWDER, LYOPHILIZED, FOR SOLUTION INTRAVENOUS at 23:40

## 2024-09-25 RX ADMIN — VANCOMYCIN HYDROCHLORIDE 2500 MG: 10 INJECTION, POWDER, LYOPHILIZED, FOR SOLUTION INTRAVENOUS at 01:57

## 2024-09-25 RX ADMIN — IPRATROPIUM BROMIDE AND ALBUTEROL SULFATE 3 DOSE: 2.5; .5 SOLUTION RESPIRATORY (INHALATION) at 09:41

## 2024-09-25 RX ADMIN — SODIUM CHLORIDE, PRESERVATIVE FREE 10 ML: 5 INJECTION INTRAVENOUS at 10:06

## 2024-09-25 RX ADMIN — CALCIUM ACETATE 1334 MG: 667 CAPSULE ORAL at 18:21

## 2024-09-25 RX ADMIN — CEFEPIME 1000 MG: 1 INJECTION, POWDER, FOR SOLUTION INTRAMUSCULAR; INTRAVENOUS at 18:22

## 2024-09-25 RX ADMIN — SODIUM CHLORIDE, PRESERVATIVE FREE 10 ML: 5 INJECTION INTRAVENOUS at 23:07

## 2024-09-25 RX ADMIN — INSULIN LISPRO 4 UNITS: 100 INJECTION, SOLUTION INTRAVENOUS; SUBCUTANEOUS at 23:06

## 2024-09-25 RX ADMIN — HEPARIN SODIUM 5000 UNITS: 5000 INJECTION INTRAVENOUS; SUBCUTANEOUS at 23:07

## 2024-09-25 RX ADMIN — SODIUM ZIRCONIUM CYCLOSILICATE 10 G: 10 POWDER, FOR SUSPENSION ORAL at 01:11

## 2024-09-25 RX ADMIN — ACETAMINOPHEN 650 MG: 325 TABLET ORAL at 16:49

## 2024-09-25 RX ADMIN — ATORVASTATIN CALCIUM 40 MG: 40 TABLET, FILM COATED ORAL at 10:05

## 2024-09-25 ASSESSMENT — PAIN - FUNCTIONAL ASSESSMENT
PAIN_FUNCTIONAL_ASSESSMENT: ACTIVITIES ARE NOT PREVENTED
PAIN_FUNCTIONAL_ASSESSMENT: 0-10

## 2024-09-25 ASSESSMENT — PAIN SCALES - GENERAL
PAINLEVEL_OUTOF10: 3
PAINLEVEL_OUTOF10: 0

## 2024-09-25 ASSESSMENT — ENCOUNTER SYMPTOMS
NAUSEA: 0
VOMITING: 0
SHORTNESS OF BREATH: 1
ABDOMINAL PAIN: 0
EYE REDNESS: 0

## 2024-09-25 ASSESSMENT — PAIN DESCRIPTION - LOCATION: LOCATION: HEAD

## 2024-09-25 ASSESSMENT — PAIN DESCRIPTION - DESCRIPTORS: DESCRIPTORS: ACHING;DULL;DISCOMFORT

## 2024-09-25 NOTE — ED NOTES
[address] [facility]   PHYSICAL / OCCUPATIONAL THERAPY - DAILY TREATMENT NOTE (updated )    Patient Name: Iman Beatty    Date: 2023    : 1971  Insurance: Payor: [employer] / Plan: [employer] / Product Type: *No Product type* /      Patient  verified yes     Visit #   Current / Total 8 8-12   Time   In / Out 435 505   Pain   In / Out 0 0   Subjective Functional Status/Changes: No dizziness; balance getting better. Overall- 70% improved   Changes to:  Meds, Allergies, Med Hx, Sx Hx? If yes, update Summary List no     TREATMENT AREA =  No admission diagnoses are documented for this encounter. OBJECTIVE        Therapeutic Procedures: Tx Min Billable or 1:1 Min (if diff from Tx Min) Procedure, Rationale, Specifics   30  [id number] Neuromuscular Re-Education (timed):  improve balance, coordination, kinesthetic sense, posture, core stability and proprioception to improve patient's ability to develop conscious control of individual muscles and awareness of position of extremities in order to progress to PLOF and address remaining functional goals. (see flow sheet as applicable)     Details if applicable:                             [facility] Totals Reminder: bill using total billable min of TIMED therapeutic procedures (example: do not include dry needle or estim unattended, both untimed codes, in totals to left)  8-22 min = 1 unit; 23-37 min = 2 units; 38-52 min = 3 units; 53-67 min = 4 units; 68-82 min = 5 units   Total Total     [x]  Patient Education billed concurrently with other procedures   [x] Review HEP    [] Progressed/Changed HEP, detail:    [] Other detail:       Objective Information/Functional Measures/Assessment    Resumed VVI but limited to 10 reps.   Instructed to perform as HEP provided good tolerance       Patient will continue to benefit from skilled PT / OT services to modify and progress therapeutic interventions, analyze and address functional mobility deficits, analyze and address imbalance/dizziness, and instruct in home and community integration to address functional deficits and attain remaining goals. Progress towards LTGs:  Long Term Goals:   1. Patient will report at least 50% reduction of symptoms with ADLs.--MET  2. Patient will be independent with self progression of HEP and demonstrate willingness to continue HEP after D/C to maximize/maintain gains in functional mobility. -progressing  3. [address] will improve to less than or equal to 6 points to demonstrate significant reduction of dizziness and imbalance with ADLs. 4. FGA will improve to greater then or equal to 30/30 points to demonstrate a significant improvement in ambulatory balance. --progressing well       PLAN  yes Continue plan of care  [x]  Upgrade activities as tolerated  []  Discharge due to :  []  Other:    Freda Adkins, PT    2/23/2023    4:36 PM    Future Appointments   Date Time Provider Ken Flores   2/27/2023  4:30 PM Freda Adkins, PT [facility] [address]   3/2/2023  4:30 PM [facility] [address]

## 2024-09-25 NOTE — ED NOTES
Provider contacted regarding patients diet order. Instructed nurse to reach out to podiatry and see if there are plans for surgery. If patient is to not have surgery today diet order can be changed to carb controlled.

## 2024-09-25 NOTE — CONSULTS
[facility]  MD Sidney Amezcua, MD Melissa Morel, YOSHI Eckert  Consultation  Patient's Name: Lizette Murray  3:56 PM  9/25/2024    Nephrologist: Sidney Mcdowell MD    Reason for Consult:  End Stage Renal Disease/Hemodialysis  Requesting Physician:  Alfonso Lopez MD    Chief Complaint:  Shortness of breath and nausea    History Obtained From: Patient, chart    History of Present Ilness:         Lizette Murray is a 69 year old woman that presented to the ED on 9/24/2024 with complaints of shortness of breath and nausea.  She reports that she was dry heaving when she woke up in the morning but had no episodes of vomiting or abdominal discomfort.  She endorsed shortness of breath which was worsening with activity and exertion.  She is typically on 3 Lper nasal cannula of oxygen at home.   She denied chest pain, palpitations, headache, dizziness, lightheadedness, numbness, tingling, fever or chills she did endorse increased edema in her left lower extremity and a left great toe wound which has worsened over the past 2 days.  She is well-known to our nephrology group and follows longitudinally with Dr. Mcdowell for treatment and management of end-stage renal disease.  She dialyzes on a Tuesday Thursday Saturday schedule at [facility] via a left arm AV fistula.  It has a good thrill and bruit.         She has a past medical history that includes: Acute on chronic heart failure with preserved ejection fraction, acute pulmonary embolism, anemia, anxiety and depression, aortic stenosis, brain aneurysm, CLABSI, diabetes mellitus, essential hypertension, gastrointestinal hemorrhage, stent, C. difficile colitis, lymphedema of both lower extremities, mild pulmonary hypertension and insertion of inferior vena cava cava filter.  Her last dialysis treatment was on Saturday and she did not go to dialysis on Tuesday as she came to the ER for  lower extremity arterial segmental pressures w PPG    (Results Pending)       Assessment  End Stage Renal Disease requiring hemodialysis caused by diabetic nephropathy and renal microvascular atherosclerotic disease  Hyperkalemia- missed dialysis treatment yesterday  Volumes overload  Hypertension  Anemia due to CKD  Secondary hyperparathyroidism/Bone mineral disease  Ulcer left great toe  Multiple open wounds BLE    Plan   Hemodialysis today  Continue IHD for removal of solutes and volume on a TTS schedule  Additional dialysis treatments on non-dialysis for removal of volume and to achieve dry weight as warranted  No need for JHONATAN- hgb > 10  Monitor labs- iron studies, Po4   Monitor I & O  Monitor BP  Low K diet  Continue supportive renal care      Thank you for the opportunity to participate in the care of your pleasant patient.  We look forward to following along with you.        Electronically signed by TAWNYA BELL CNP on 9/25/2024 at 3:56 PM

## 2024-09-25 NOTE — CONSULTS
Podiatry Consult H&P  9/25/2024   Lizette Murray     HISTORY OF PRESENT ILLNESS: This is a 69 y.o. female seen bedside for left great toe wound. Patient states she noticed a blister on her great toe recently and bloody drainage coming from the great toe. Patient denies n,v,f,c,d at this time. Patient has no other pedal complaints.    Past Medical History:   Diagnosis Date    Acute on chronic heart failure with preserved ejection fraction ([facility]) 02/07/2023    Acute pulmonary embolism ([facility]) 12/03/2022    Anemia due to stage 5 chronic kidney disease, not on chronic dialysis ([facility]) 02/08/2023    Anemia in CKD (chronic kidney disease) 11/30/2021    Anxiety and depression 01/19/2022    Aortic stenosis, mild 02/08/2023 12/2022    At high risk for falls 01/19/2022    Brain aneurysm     CLABSI (central line-associated bloodstream infection) 11/19/2021    Cough 01/19/2022    Diabetes mellitus ([facility]) 2006    Diabetes mellitus type 2 with complications ([facility]) 11/30/2021    Dizziness on standing 01/19/2022    Encounter regarding vascular access for dialysis for ESRD ([facility]) 07/30/2024    End-stage renal disease on hemodialysis ([facility]) 01/19/2023    ESRD (end stage renal disease) ([facility]) 11/19/2021    ESRD on hemodialysis ([facility]) 11/19/2021    Essential hypertension 11/30/2021    Fever, unspecified     Gastrointestinal hemorrhage 11/30/2021    H/O heart artery stent 2015    Done in [place]    History of Clostridioides difficile colitis 01/19/2022    History of pulmonary embolus (PE) 02/08/2023 12/2022    Hyperlipidemia     Hypertension     Leg swelling 01/19/2023    Lymphedema of both lower extremities 01/19/2023    Mild pulmonary hypertension ([facility]) 2021    Transesophageal echo    MSSA bacteremia 11/18/2021    Nausea & vomiting 11/18/2021    Obesity, Class III, BMI 40-49.9 (morbid obesity) ([facility]) 01/19/2022    Periumbilical abdominal pain     S/P insertion of inferior vena caval filter 01/20/2023        Past Surgical History:

## 2024-09-25 NOTE — H&P
[facility] History and Physical      CHIEF COMPLAINT:  shortness of breath and dry heaves    History of Present Illness:  This is a 69 year old female with PMH significant for CHF, PE, ESRD on HD Tuesdays, Thursdays, Saturdays, mild AR, diabetes mellitus type II, HTN, mild pulmonary hypertension, HLD, and obesity.  Patient to ED due to shortness of breath and dry heaves. wears 2-2.5 L of oxygen at home and arrived to ED with no oxygen on and was 81% on room air.  Now oxygen saturation is 100% on 2 L via nasal cannula.  Patient missed dialysis yesterday due to not feeling well.  States that she had the dry heaves for 4 hours that is why she came to the hospital.  Denies any dry heaving at this time.  Denies fever, chills, shortness of breath, chest pain, abdominal pain, nausea/vomiting, and changes in bowel/bladder habits.  Patient had a blister on left great toe that which broke open in ED draining serous drainage.  Bilateral lower extremities reddened with multiple open wounds.  Patient reports that she has had the wounds for a while.  Does not follow with a podiatrist.  Treats wounds with bacitracin.    Labs remarkable for potassium 5.5, BUN/creatinine 80/8.7, AG 19, GFR 5, troponin 72, H&H 10.4/32.5.  CTA showing no acute pulmonary disease with incidental left thyroid lesion.  Left foot x-ray shows possible osseous erosion versus subchondral cyst suggested over the dorsal head of the first metatarsal.  Finding is nonspecific and can be seen in osteoarthritis or crystalline arthropathy.  In the absence of an overlying skin ulceration, osteomyelitis is considered less likely.  Given Lokelma, cefepime, and vancomycin in ED.  Will admit for further evaluation and treatment.    Informant(s) for H&P:Patient and chart review    REVIEW OF SYSTEMS:  A comprehensive review of systems was negative except for: what is in the HPI      PMH:  Past Medical History:   Diagnosis Date    Acute on chronic

## 2024-09-25 NOTE — CONSULTS
[facility]  MD Sidney Amezcua, MD Melissa Morel, YOSHI Eckert  Consultation  Patient's Name: Lizette Murray  12:05 PM  9/25/2024    Nephrologist: Sidney Mcdowell MD    Reason for Consult:  End Stage Renal Disease/Hemodialysis  Requesting Physician:  Alfonso Lopez MD    Chief Complaint:  Shortness of breath and nausea    History Obtained From: Patient, chart    History of Present Ilness:         Lizette Murray is a 69 year old woman that presented to the ED on 9/24/2024 with complaints of shortness of breath and nausea.  She reports that she was dry heaving when she woke up in the morning but had no episodes of vomiting or abdominal discomfort.  She endorsed shortness of breath which was worsening with activity and exertion.  She is typically on 3 Lper nasal cannula of oxygen at home.   She denied chest pain, palpitations, headache, dizziness, lightheadedness, numbness, tingling, fever or chills she did endorse increased edema in her left lower extremity and a left great toe wound which has worsened over the past 2 days.  She is well-known to our nephrology group and follows longitudinally with Dr. Mcdowell for treatment and management of end-stage renal disease.  She dialyzes on a Tuesday Thursday Saturday schedule at [place] [facility] via a left arm AV fistula  She has a past medical history that includes: Acute on chronic heart failure with preserved ejection fraction, acute pulmonary embolism, anemia, anxiety and depression, aortic stenosis, brain aneurysm, CLABSI, diabetes mellitus, essential hypertension, gastrointestinal hemorrhage, stent, C. difficile colitis, lymphedema of both lower extremities, mild pulmonary hypertension and insertion of inferior vena cava cava filter.  Her last dialysis treatment was on Saturday and she did not go to dialysis on Tuesday as she came to the ER for evaluation.       Abnormal labs in the ED  components found for: \"IONCA\"  Magnesium:    Lab Results   Component Value Date/Time    MG 2.1 02/23/2024 06:05 AM     Phosphorus:    Lab Results   Component Value Date/Time    PHOS 5.4 08/08/2024 07:30 AM     U/A:    Lab Results   Component Value Date/Time    COLORU Yellow 11/18/2021 03:19 AM    PHUR 6.0 11/18/2021 03:19 AM    WBCUA 1-3 11/18/2021 03:19 AM    RBCUA 0-1 11/18/2021 03:19 AM    BACTERIA FEW 11/18/2021 03:19 AM    CLARITYU Clear 11/18/2021 03:19 AM    LEUKOCYTESUR Negative 11/18/2021 03:19 AM    UROBILINOGEN 0.2 11/18/2021 03:19 AM    BILIRUBINUR Negative 11/18/2021 03:19 AM    BLOODU SMALL 11/18/2021 03:19 AM    GLUCOSEU 500 11/18/2021 03:19 AM     Microalbumen/Creatinine ratio:  No components found for: \"RUCREAT\"  Iron Saturation:  No components found for: \"PERCENTFE\"  TIBC:    Lab Results   Component Value Date/Time    TIBC 170 02/22/2024 05:27 AM     FERRITIN:    Lab Results   Component Value Date/Time    FERRITIN 2,598 05/09/2023 02:45 AM        Imaging:  XR FOOT LEFT (MIN 3 VIEWS)   Final Result   1.  Osteopenia limits evaluation of fractures and erosions.      2.  Subtle osseous erosion versus subchondral cyst suggested over the dorsal   head of the 1st metatarsal. This finding is nonspecific and can be seen in   osteoarthritis or crystalline arthropathy.  In the absence of an overlying   skin ulceration, osteomyelitis is considered less likely.  Recommend direct   visualization for determination of skin integrity.         CTA PULMONARY W CONTRAST   Final Result   No acute pulmonary embolism identified. No focal infiltrates.      Left thyroid lesion.  Could correlate with ultrasound if clinically warranted.         XR CHEST (2 VW)   Final Result   No acute process.      Stable cardiomegaly.         Vascular duplex lower extremity venous left   Final Result   No evidence of DVT in the left lower extremity.             Assessment  End Stage Renal Disease requiring hemodialysis caused by diabetic  nephropathy and renal microvascular atherosclerotic disease  Hyperkalemia- missed dialysis treatment yesterday  Volumes overload  Hypertension  Anemia due to CKD  Secondary hyperparathyroidism/Bone mineral disease  Ulcer left great toe  Multiple open wounds BLE    Plan   Hemodialysis today  Continue IHD for removal of solutes and volume on a TTS schedule  Additional dialysis treatments on non-dialysis for removal of volume and to achieve dry weight as warranted  No need for JHONATAN- hgb > 10  Monitor labs- iron studies, Po4   Monitor I & O  Monitor BP  Low K diet  Continue supportive renal care      Thank you for the opportunity to participate in the care of your pleasant patient.  We look forward to following along with you.        Electronically signed by TAWNYA BELL CNP on 9/25/2024 at 12:05 PM

## 2024-09-25 NOTE — FLOWSHEET NOTE
09/25/24 1547   Vital Signs   /64   Temp 97.4 °F (36.3 °C)   Pulse 64   Respirations 16   Weight - Scale   (cart)   Pain Assessment   Pain Assessment None - Denies Pain   Post-Hemodialysis Assessment   Post-Treatment Procedures Blood returned;Access bleeding time < 10 minutes   Machine Disinfection Process Acid/Vinegar Clean;Heat Disinfect;Exterior Machine Disinfection   Blood Volume Processed (Liters) 87.2 L   Dialyzer Clearance Clear   Duration of Treatment (minutes) 225 minutes   Hemodialysis Intake (ml) 300 ml   Hemodialysis Output (ml) 2600 ml   NET Removed (ml) 2300   Tolerated Treatment Good   Bilateral Breath Sounds Clear   Edema Right upper extremity;Left upper extremity;Right lower extremity;Left lower extremity   RUE Edema Trace   LUE Edema Trace   RLE Edema +1;Pitting   LLE Edema +1;Pitting   Time Off 1532   Patient Disposition Return to room   Observations & Evaluations   Level of Consciousness 0   Heart Rhythm Regular   Respiratory Quality/Effort Unlabored   O2 Device Nasal cannula   Skin Color Pink   Skin Condition/Temp Dry;Warm   Abdomen Inspection Soft   Bowel Sounds (All Quadrants) Active

## 2024-09-25 NOTE — PROGRESS NOTES
4 Eyes Skin Assessment     NAME:  Lizette Murray  YOB: 1955  MEDICAL RECORD NUMBER:  [id number]    The patient is being assessed for  Admission    I agree that at least one RN has performed a thorough Head to Toe Skin Assessment on the patient. ALL assessment sites listed below have been assessed.      Areas assessed by both nurses:    Head, Face, Ears, Shoulders, Back, Chest, Arms, Elbows, Hands, Sacrum. Buttock, Coccyx, Ischium, and Legs. Feet and Heels        Does the Patient have a Wound? No noted wound(s)       Dm Prevention initiated by RN: No  Wound Care Orders initiated by RN: No    Pressure Injury (Stage 3,4, Unstageable, DTI, NWPT, and Complex wounds) if present, place Wound referral order by RN under : No    New Ostomies, if present place, Ostomy referral order under : No     Nurse 1 eSignature: Electronically signed by Faith Crespo RN on 9/25/24 at 6:32 PM EDT    **SHARE this note so that the co-signing nurse can place an eSignature**    Nurse 2 eSignature: {Esignature:[id number]}

## 2024-09-25 NOTE — ED NOTES
Spoke with Dr. Hogue regarding patient status and whether or not patient was going to be having surgery today. Per provider patient was not going to have surgery today and is okay to be feed.

## 2024-09-25 NOTE — PROGRESS NOTES
Your patient is on a medication that requires a renal and/or weight dose adjustment.    Renal/Body Weight Function Assessment:    Date Body Weight IBW  Adjusted BW SCr  CrCl Dialysis status   9/25/2024 113.4 kg Ideal body weight: 54.7 kg (120 lb 9.5 oz)  Adjusted ideal body weight: 78.2 kg (172 lb 5.7 oz) Serum creatinine: 9.4 mg/dL (HH) 09/25/24 0728  Estimated creatinine clearance: 7 mL/min (A) HD       Pharmacy has dose-adjusted the following medication(s):    Date Previous Order Adjusted Order   9/25/2024 Cefepime 1000 mg q12h Cefepime 1000 mg q24h       These changes were made per protocol according to the [facility] Renal Dosing Policy.     *Please note this dose may need readjusted if your patient's condition changes.    Please contact pharmacy with any questions regarding these changes.    Electronically signed by Breonna Reina RPH, Pharm.D. 9/25/2024 10:16 AM   Ext: [phone]

## 2024-09-25 NOTE — ED PROVIDER NOTES
[facility] EMERGENCY DEPARTMENT  EMERGENCY DEPARTMENT ENCOUNTER        Pt Name: Lizette Murray  MRN: [id number]  Birthdate 1955  Date of evaluation: 9/24/2024  Provider: Tamra Skelton DO  PCP: Lourdes Murrell MD  Note Started: 12:18 AM EDT 9/25/24    CHIEF COMPLAINT       Chief Complaint   Patient presents with    Shortness of Breath     Pt arrived at 81% only because she didn't wear home oxygen on the way here. Wears 3.5 liters at home.        HISTORY OF PRESENT ILLNESS: 1 or more Elements       Lizette Murray is a 69 y.o. female who presents to the emergency department the chief complaint of shortness of breath as well as nausea.  The patient states that she woke up today this morning and was very nauseous.  She states that she was dry heaving.  She had no episodes of vomiting.  She did complain of associated shortness of breath which is worse with activity and exertion.  She is typically on 3 L nasal cannula at home.  The patient has also had increasing edema noted of her left leg.  She also reports a left great toe wound which has worsened over the last 2 days.  She has not seen a podiatrist.  The patient states she does have a history of pulmonary embolus.  She is not currently on Eliquis.  She did continue a 6-month course of Eliquis.  She does follow with Dr. Mcdowell for dialysis.  Her last dialysis was Saturday she is due today she did not go secondary to not feeling well.    Nursing Notes were all reviewed and agreed with or any disagreements were addressed in the HPI.    REVIEW OF SYSTEMS :      Review of Systems   Constitutional:  Negative for chills and fatigue.   HENT:  Negative for congestion.    Eyes:  Negative for redness.   Respiratory:  Positive for shortness of breath.    Cardiovascular:  Positive for leg swelling. Negative for chest pain.   Gastrointestinal:  Negative for abdominal pain, nausea and vomiting.   Genitourinary:  Negative for dysuria.  shortness of breath and fatigue.  The patient is sitting in the bed no acute distress, mildly coarse breath sounds, mild expiratory wheezing present, no rales or rhonchi.Moves all extremities x 4. Warm and well perfused, there are superficial wounds present in the lateral aspect of the patient's left leg, there is no overlying erythema, warm to touch or cellulitic changes, there is edema and erythema noted on the patient left great toe, as well as mild necrosis of the left great toe.  Differential diagnose includes but not limited to cellulitis, anemia, volume overload, pneumonia, pulmonary embolus, pneumothorax, necrotizing fasciitis.  The patient did have labs and imaging reviewed by me demonstrating CBC fairly unremarkable, CMP with mildly elevated potassium at 5.5, creatinine elevated at 8.7, high-sensitivity troponin 72, COVID-19 negative, CTA of the chest demonstrates no evidence of pulmonary embolus, chest x-ray demonstrates no acute process.  Results of today were discussed with the patient.  Patient was covered with IV vancomycin and cefepime.  She initially did not report any toe wound.  This is present when I was getting ready to disposition the patient.  The wound was examined vancomycin and cefepime ordered.  Patient will be admitted for further treatment evaluation    Chronic conditions:   Past Medical History:   Diagnosis Date    Acute on chronic heart failure with preserved ejection fraction (HCC) 02/07/2023    Acute pulmonary embolism (HCC) 12/03/2022    Anemia due to stage 5 chronic kidney disease, not on chronic dialysis (HCC) 02/08/2023    Anemia in CKD (chronic kidney disease) 11/30/2021    Anxiety and depression 01/19/2022    Aortic stenosis, mild 02/08/2023 12/2022    At high risk for falls 01/19/2022    Brain aneurysm     CLABSI (central line-associated bloodstream infection) 11/19/2021    Cough 01/19/2022    Diabetes mellitus ([facility]) 2006    Diabetes mellitus type 2 with complications ([facility])  11/30/2021    Dizziness on standing 01/19/2022    Encounter regarding vascular access for dialysis for ESRD ([facility]) 07/30/2024    End-stage renal disease on hemodialysis ([facility]) 01/19/2023    ESRD (end stage renal disease) ([facility]) 11/19/2021    ESRD on hemodialysis ([facility]) 11/19/2021    Essential hypertension 11/30/2021    Fever, unspecified     Gastrointestinal hemorrhage 11/30/2021    H/O heart artery stent 2015    Done in [place]    History of Clostridioides difficile colitis 01/19/2022    History of pulmonary embolus (PE) 02/08/2023 12/2022    Hyperlipidemia     Hypertension     Leg swelling 01/19/2023    Lymphedema of both lower extremities 01/19/2023    Mild pulmonary hypertension ([facility]) 2021    Transesophageal echo    MSSA bacteremia 11/18/2021    Nausea & vomiting 11/18/2021    Obesity, Class III, BMI 40-49.9 (morbid obesity) ([facility]) 01/19/2022    Periumbilical abdominal pain     S/P insertion of inferior vena caval filter 01/20/2023         Social determinants: The patient is a former smoker    Records reviewed: Reviewed patient's medical record patient seen in the office on 8/15/2024 by Dr. Murrell for type 2 diabetes      Patient treated with   Medications   ipratropium 0.5 mg-albuterol 2.5 mg (DUONEB) nebulizer solution 3 Dose (has no administration in time range)   sodium zirconium cyclosilicate (LOKELMA) oral suspension 10 g (has no administration in time range)   vancomycin (VANCOCIN) 2,500 mg in sodium chloride 0.9 % 500 mL IVPB (has no administration in time range)   cefepime (MAXIPIME) 1,000 mg in sodium chloride 0.9 % 50 mL IVPB (has no administration in time range)   iopamidol (ISOVUE-370) 76 % injection 75 mL (75 mLs IntraVENous Given 9/24/24 3535)         EKG:  This EKG is signed and interpreted by me.    Sinus rhythm with PACs, rate of 69, no ST segment elevation pression, NM normal 160 MS, QRS 86 MS, QTc 472 MS    Discussions with other health professionals:      Dr. Sampson will admit the

## 2024-09-25 NOTE — PROGRESS NOTES
Pharmacy Consultation Note  (Antibiotic Dosing and Monitoring)    Initial consult date: 9/25/2024   Consulting physician/provider: Maki Harvey APRN - CNP   Drug: Vancomycin  Indication: SSTI    Age/  Gender Height Weight IBW  Allergy Information   69 y.o./female 162.6 cm (5' 4\") 113.4 kg (250 lb)     Ideal body weight: 54.7 kg (120 lb 9.5 oz)  Adjusted ideal body weight: 78.2 kg (172 lb 5.7 oz)   Pcn [penicillins], Morphine, Sodium hypochlorite, and Tessalon [benzonatate]      Renal Function:  Recent Labs     09/24/24  1812 09/25/24  0728   BUN 80* 84*   CREATININE 8.7* 9.4*       Intake/Output Summary (Last 24 hours) at 9/25/2024 1641  Last data filed at 9/25/2024 1547  Gross per 24 hour   Intake 850 ml   Output 2600 ml   Net -1750 ml       Vancomycin Monitoring:  Trough:  No results for input(s): \"VANCOTROUGH\" in the last 72 hours.  Random:  No results for input(s): \"VANCORANDOM\" in the last 72 hours.    Vancomycin Administration Times:  Recent vancomycin administrations                     vancomycin (VANCOCIN) 2,500 mg in sodium chloride 0.9 % 500 mL IVPB (mg) 2,500 mg New Bag 09/25/24 0157             Assessment:  Patient is a 69 y.o. female who has been initiated on vancomycin.  Estimated Creatinine Clearance: 7 mL/min (A) (based on SCr of 9.4 mg/dL (HH)).  To dose vancomycin, pharmacy will be utilizing dosing based off of levels due to patient requiring hemodialysis- T/S/S schedule.     Plan:  750 mg vancomycin x1 after HD today.   Random level ordered tomorrow with AM labs in order to determine further vancomycin dosing.   Pharmacy to follow.      Electronically signed by Breonna Reina RPH, Pharm.D. 9/25/2024 4:45 PM   Ext: [phone]    JODI: [phone]  SEY: [phone]  SJW: [phone]

## 2024-09-25 NOTE — PROGRESS NOTES
Radiology Procedure Waiver   Name: Lizette Murray  : 1955  MRN: [id number]    Date:  24    Time: 10:16 PM EDT    Benefits of immediately proceeding with Radiology exam(s) without pre-testing outweigh the risks or are not indicated as specified below and therefore the following is/are being waived:    [] Pregnancy test   [] Patients LMP on-time and regular.   [] Patient had Tubal Ligation or has other Contraception Device.   [] Patient  is Menopausal or Premenarcheal.    [] Patient had Full or Partial Hysterectomy.    [] Protocol for Iodine allergy    [] MRI Questionnaire     [x] BUN/Creatinine   [] Patient age w/no hx of renal dysfunction.   [x] Patient on Dialysis.   [] Recent Normal Labs.  Electronically signed by Tamra Skelton DO on 24 at 10:16 PM EDT

## 2024-09-25 NOTE — PROGRESS NOTES
Pharmacy Note    Order for Zofran has been interchanged to Compazine for this patient due to the risk of QT prolongation.    QTc as of 9- = 505 ms    Joel Sanchez RPH  9/25/2024  3:42 AM

## 2024-09-25 NOTE — PLAN OF CARE
Patient admitted this morning by hospitalist team.  Per H&P:     69 year old female with PMH significant for CHF, PE, ESRD on HD Tuesdays, Thursdays, Saturdays, mild AR, diabetes mellitus type II, HTN, mild pulmonary hypertension, HLD, and obesity.  Patient to ED due to shortness of breath and dry heaves. wears 2-2.5 L of oxygen at home and arrived to ED with no oxygen on and was 81% on room air.  Now oxygen saturation is 100% on 2 L via nasal cannula.  Patient missed dialysis yesterday due to not feeling well.  States that she had the dry heaves for 4 hours that is why she came to the hospital.  Denies any dry heaving at this time.  Denies fever, chills, shortness of breath, chest pain, abdominal pain, nausea/vomiting, and changes in bowel/bladder habits.  Patient had a blister on left great toe that which broke open in ED draining serous drainage.  Bilateral lower extremities reddened with multiple open wounds.  Patient reports that she has had the wounds for a while.  Does not follow with a podiatrist.  Treats wounds with bacitracin.     Labs remarkable for potassium 5.5, BUN/creatinine 80/8.7, AG 19, GFR 5, troponin 72, H&H 10.4/32.5.  CTA showing no acute pulmonary disease with incidental left thyroid lesion.  Left foot x-ray shows possible osseous erosion versus subchondral cyst suggested over the dorsal head of the first metatarsal.  Finding is nonspecific and can be seen in osteoarthritis or crystalline arthropathy.  In the absence of an overlying skin ulceration, osteomyelitis is considered less likely.  Given Lokelma, cefepime, and vancomycin in ED.  Will admit for further evaluation and treatment.       Wound infection  Diabetic ulcer of left great toe  Multiple open wounds  -Started on cefepime and vancomycin in ED.  Will continue for now.  WBC within normal limits.  Patient afebrile.  -Consult podiatry for input.  Appears to be a blood blister that broke open.  Unsure if tissue is necrotic.  X-ray

## 2024-09-26 ENCOUNTER — APPOINTMENT (OUTPATIENT)
Dept: ULTRASOUND IMAGING | Age: 69
End: 2024-09-26
Payer: MEDICARE

## 2024-09-26 LAB
ANION GAP SERPL CALCULATED.3IONS-SCNC: 18 MMOL/L (ref 7–16)
BASOPHILS # BLD: 0.03 K/UL (ref 0–0.2)
BASOPHILS NFR BLD: 0 % (ref 0–2)
BUN SERPL-MCNC: 38 MG/DL (ref 6–23)
CALCIUM SERPL-MCNC: 8.6 MG/DL (ref 8.6–10.2)
CHLORIDE SERPL-SCNC: 90 MMOL/L (ref 98–107)
CO2 SERPL-SCNC: 30 MMOL/L (ref 22–29)
CREAT SERPL-MCNC: 5.4 MG/DL (ref 0.5–1)
EOSINOPHIL # BLD: 0.35 K/UL (ref 0.05–0.5)
EOSINOPHILS RELATIVE PERCENT: 5 % (ref 0–6)
ERYTHROCYTE [DISTWIDTH] IN BLOOD BY AUTOMATED COUNT: 15.6 % (ref 11.5–15)
FERRITIN SERPL-MCNC: 6116 NG/ML
GFR, ESTIMATED: 8 ML/MIN/1.73M2
GLUCOSE BLD-MCNC: 108 MG/DL (ref 74–99)
GLUCOSE BLD-MCNC: 142 MG/DL (ref 74–99)
GLUCOSE BLD-MCNC: 241 MG/DL (ref 74–99)
GLUCOSE BLD-MCNC: 242 MG/DL (ref 74–99)
GLUCOSE SERPL-MCNC: 132 MG/DL (ref 74–99)
HCT VFR BLD AUTO: 32.1 % (ref 34–48)
HGB BLD-MCNC: 10.3 G/DL (ref 11.5–15.5)
IMM GRANULOCYTES # BLD AUTO: 0.04 K/UL (ref 0–0.58)
IMM GRANULOCYTES NFR BLD: 1 % (ref 0–5)
IRON SATN MFR SERPL: 40 % (ref 15–50)
IRON SERPL-MCNC: 75 UG/DL (ref 37–145)
LYMPHOCYTES NFR BLD: 0.75 K/UL (ref 1.5–4)
LYMPHOCYTES RELATIVE PERCENT: 10 % (ref 20–42)
MCH RBC QN AUTO: 32.7 PG (ref 26–35)
MCHC RBC AUTO-ENTMCNC: 32.1 G/DL (ref 32–34.5)
MCV RBC AUTO: 101.9 FL (ref 80–99.9)
MONOCYTES NFR BLD: 0.62 K/UL (ref 0.1–0.95)
MONOCYTES NFR BLD: 9 % (ref 2–12)
NEUTROPHILS NFR BLD: 75 % (ref 43–80)
NEUTS SEG NFR BLD: 5.42 K/UL (ref 1.8–7.3)
PHOSPHATE SERPL-MCNC: 5.1 MG/DL (ref 2.5–4.5)
PLATELET, FLUORESCENCE: 129 K/UL (ref 130–450)
PMV BLD AUTO: 11.7 FL (ref 7–12)
POTASSIUM SERPL-SCNC: 4.1 MMOL/L (ref 3.5–5)
RBC # BLD AUTO: 3.15 M/UL (ref 3.5–5.5)
SODIUM SERPL-SCNC: 138 MMOL/L (ref 132–146)
TIBC SERPL-MCNC: 186 UG/DL (ref 250–450)
VANCOMYCIN SERPL-MCNC: 30.4 UG/ML (ref 5–40)
WBC OTHER # BLD: 7.2 K/UL (ref 4.5–11.5)

## 2024-09-26 PROCEDURE — 1200000000 HC SEMI PRIVATE

## 2024-09-26 PROCEDURE — 36415 COLL VENOUS BLD VENIPUNCTURE: CPT

## 2024-09-26 PROCEDURE — 82962 GLUCOSE BLOOD TEST: CPT

## 2024-09-26 PROCEDURE — 83550 IRON BINDING TEST: CPT

## 2024-09-26 PROCEDURE — 6360000002 HC RX W HCPCS: Performed by: NURSE PRACTITIONER

## 2024-09-26 PROCEDURE — 83540 ASSAY OF IRON: CPT

## 2024-09-26 PROCEDURE — 84100 ASSAY OF PHOSPHORUS: CPT

## 2024-09-26 PROCEDURE — 2500000003 HC RX 250 WO HCPCS: Performed by: NURSE PRACTITIONER

## 2024-09-26 PROCEDURE — 2580000003 HC RX 258: Performed by: NURSE PRACTITIONER

## 2024-09-26 PROCEDURE — 85025 COMPLETE CBC W/AUTO DIFF WBC: CPT

## 2024-09-26 PROCEDURE — 90935 HEMODIALYSIS ONE EVALUATION: CPT

## 2024-09-26 PROCEDURE — 80202 ASSAY OF VANCOMYCIN: CPT

## 2024-09-26 PROCEDURE — 6370000000 HC RX 637 (ALT 250 FOR IP): Performed by: NURSE PRACTITIONER

## 2024-09-26 PROCEDURE — 80048 BASIC METABOLIC PNL TOTAL CA: CPT

## 2024-09-26 PROCEDURE — 82728 ASSAY OF FERRITIN: CPT

## 2024-09-26 PROCEDURE — 99233 SBSQ HOSP IP/OBS HIGH 50: CPT | Performed by: INTERNAL MEDICINE

## 2024-09-26 RX ADMIN — CALCIUM ACETATE 1334 MG: 667 CAPSULE ORAL at 17:16

## 2024-09-26 RX ADMIN — SODIUM CHLORIDE, PRESERVATIVE FREE 10 ML: 5 INJECTION INTRAVENOUS at 08:19

## 2024-09-26 RX ADMIN — CALCIUM ACETATE 1334 MG: 667 CAPSULE ORAL at 08:17

## 2024-09-26 RX ADMIN — CALCIUM ACETATE 1334 MG: 667 CAPSULE ORAL at 11:30

## 2024-09-26 RX ADMIN — CEFEPIME 1000 MG: 1 INJECTION, POWDER, FOR SOLUTION INTRAMUSCULAR; INTRAVENOUS at 18:11

## 2024-09-26 RX ADMIN — INSULIN LISPRO 1 UNITS: 100 INJECTION, SOLUTION INTRAVENOUS; SUBCUTANEOUS at 11:31

## 2024-09-26 RX ADMIN — ATORVASTATIN CALCIUM 40 MG: 40 TABLET, FILM COATED ORAL at 08:17

## 2024-09-26 RX ADMIN — HEPARIN SODIUM 5000 UNITS: 5000 INJECTION INTRAVENOUS; SUBCUTANEOUS at 20:03

## 2024-09-26 RX ADMIN — INSULIN GLARGINE 15 UNITS: 100 INJECTION, SOLUTION SUBCUTANEOUS at 08:17

## 2024-09-26 RX ADMIN — HEPARIN SODIUM 5000 UNITS: 5000 INJECTION INTRAVENOUS; SUBCUTANEOUS at 06:25

## 2024-09-26 NOTE — CARE COORDINATION
9/26/2024  Social Work Discharge Planning: Multiple falls.Left great toe wound. IV ATB. This worker dicussed discharge planning with Pts daughter due to Pt just left for dialysis. Pt and daughter Meghann reside together in a one story home and Pt uses  a ww and wc for long distances.  Pt is on 3l o2 here and uses 2l via [employer].  [facility] [phone] [facility] at 5:30am. Daughter transports Pt there. Electronically signed by HILDA Guevara on 9/26/2024 at 1:10 PM

## 2024-09-26 NOTE — ACP (ADVANCE CARE PLANNING)
9/26/2024.Advance Care Planning   Healthcare Decision Maker:    Primary Decision Maker: TrevorMeghann - Micky - [phone]    Secondary Decision Maker: Wang Murray - Micky - [phone]    Click here to complete Healthcare Decision Makers including selection of the Healthcare Decision Maker Relationship (ie \"Primary\").

## 2024-09-26 NOTE — FLOWSHEET NOTE
09/26/24 1601   Vital Signs   BP (!) 106/38   Temp 98 °F (36.7 °C)   Pulse 59   Respirations 18   Weight - Scale 110 kg (242 lb 8.1 oz)   Weight Method Bed scale   Percent Weight Change -3   Pain Assessment   Pain Assessment None - Denies Pain   Post-Hemodialysis Assessment   Post-Treatment Procedures Blood returned   Machine Disinfection Process Acid/Vinegar Clean;Exterior Machine Disinfection;Heat Disinfect   Blood Volume Processed (Liters) 81 L   Dialyzer Clearance Clotted   Duration of Treatment (minutes) 220 minutes   Hemodialysis Intake (ml) 300 ml   Hemodialysis Output (ml) 2800 ml   NET Removed (ml) 2500   Tolerated Treatment Good   Patient Response to Treatment Tolerated tx well   Bilateral Breath Sounds Clear   Edema Generalized   Observations & Evaluations   Level of Consciousness 0   Heart Rhythm Regular   Respiratory Quality/Effort Unlabored

## 2024-09-26 NOTE — PLAN OF CARE
Problem: Discharge Planning  Goal: Discharge to home or other facility with appropriate resources  9/26/2024 1049 by Faith Crespo RN  Outcome: Progressing  9/26/2024 0322 by Nunu Ritter RN  Outcome: Progressing     Problem: Chronic Conditions and Co-morbidities  Goal: Patient's chronic conditions and co-morbidity symptoms are monitored and maintained or improved  9/26/2024 1049 by Faith Crespo RN  Outcome: Progressing  9/26/2024 0322 by Nunu Ritter RN  Outcome: Progressing     Problem: Skin/Tissue Integrity  Goal: Absence of new skin breakdown  Description: 1.  Monitor for areas of redness and/or skin breakdown  2.  Assess vascular access sites hourly  3.  Every 4-6 hours minimum:  Change oxygen saturation probe site  4.  Every 4-6 hours:  If on nasal continuous positive airway pressure, respiratory therapy assess nares and determine need for appliance change or resting period.  9/26/2024 0322 by Nunu Ritter, RN  Outcome: Progressing     Problem: Safety - Adult  Goal: Free from fall injury  9/26/2024 0322 by Nunu Ritter, RN  Outcome: Progressing

## 2024-09-26 NOTE — PROGRESS NOTES
Patient states that she got red man syndrome from vancomycin notified NP advised to run at 100 and give 25 mg of benadryl.   Patient's BS is 364 notified Np see new orders . Giving additional 4 units

## 2024-09-26 NOTE — PROGRESS NOTES
Podiatry Progress Note  9/26/2024   Lizette Murray       SUBJECTIVE: This is a 69 y.o. female seen bedside for left great toe wound. Patient states she noticed a blister on her great toe recently and bloody drainage coming from the great toe. Patient denies n,v,f,c,d at this time. Patient has no other pedal complaints.       OBJECTIVE:      Pt is AAOx3    Vitals:    09/26/24 0730   BP: 118/60   Pulse: 73   Resp: 18   Temp: 97.7 °F (36.5 °C)   SpO2:       EXAM:      Vascular Exam:  DP and PT pulses are faintly palpable. CFT <5 seconds to digits. Skin temp is warm to warm from proximal to distal.     Neuro Exam:  Light touch sensation is intact     Dermatologic Exam:  There is a fluid filled blister noted to dorsolateral left hallux. There is surrounding erythema and edema. There is no malodor or purulence noted. There is milld fluctuance noted to the area. Wound does not probe to bone.     MSK: Deferred         Current Facility-Administered Medications   Medication Dose Route Frequency Provider Last Rate Last Admin    sodium chloride flush 0.9 % injection 5-40 mL  5-40 mL IntraVENous 2 times per day Maki Harvey APRN - CNP   10 mL at 09/26/24 0819    sodium chloride flush 0.9 % injection 5-40 mL  5-40 mL IntraVENous PRN Maki Harvey APRN - CNP        0.9 % sodium chloride infusion   IntraVENous PRN Maki Harvey APRN - CNP        heparin (porcine) injection 5,000 Units  5,000 Units SubCUTAneous 3 times per day Maki Harvey APRN - CNP   5,000 Units at 09/26/24 0625    polyethylene glycol (GLYCOLAX) packet 17 g  17 g Oral Daily PRN Maki Harvey APRN - CNP        acetaminophen (TYLENOL) tablet 650 mg  650 mg Oral Q6H PRN Maki Harvey APRN - CNP   650 mg at 09/25/24 1649    Or    acetaminophen (TYLENOL) suppository 650 mg  650 mg Rectal Q6H PRN Maki Harvey APRN - CNP        atorvastatin (LIPITOR) tablet 40 mg  40 mg Oral QAM Maki Harvey APRN - CNP   40 mg at 09/26/24  0817    calcium acetate (PHOSLO) capsule 1,334 mg  2 capsule Oral TID  Maki Harvey APRN - CNP   1,334 mg at 09/26/24 1130    insulin glargine (LANTUS) injection vial 15 Units  15 Units SubCUTAneous QAM Maki Harvey APRN - CNP   15 Units at 09/26/24 0817    midodrine (PROAMATINE) tablet 10 mg  10 mg Oral BID PRN Maki Harvey APRN - CNP        insulin lispro (HUMALOG,ADMELOG) injection vial 0-4 Units  0-4 Units SubCUTAneous TID  Maki Harvey APRN - CNP   1 Units at 09/26/24 1131    insulin lispro (HUMALOG,ADMELOG) injection vial 0-4 Units  0-4 Units SubCUTAneous Nightly Maki Harvey APRN - CNP   4 Units at 09/25/24 2306    prochlorperazine (COMPAZINE) tablet 10 mg  10 mg Oral Q8H PRN Maki Harvey APRN - CNP        Or    prochlorperazine (COMPAZINE) injection 10 mg  10 mg IntraVENous Q6H PRN Maki Harvey APRN - CNP        cefepime (MAXIPIME) 1,000 mg in sodium chloride 0.9 % 50 mL IVPB  1,000 mg IntraVENous Q24H Maki aHrvey APRN - CNP   Stopped at 09/25/24 2305    vancomycin (VANCOCIN) intermittent dosing (placeholder)   Other RX Placeholder Maki Harvey APRN - CNP        diphenhydrAMINE (BENADRYL) tablet 25 mg  25 mg Oral Q6H PRN Maki Harvey APRN - CNP   25 mg at 09/25/24 2342        Lab Results   Component Value Date    WBC 7.2 09/26/2024    HCT 32.1 (L) 09/26/2024    HGB 10.3 (L) 09/26/2024     08/08/2024     09/26/2024    K 4.1 09/26/2024    CL 90 (L) 09/26/2024    CO2 30 (H) 09/26/2024    BUN 38 (H) 09/26/2024    CREATININE 5.4 (H) 09/26/2024    GLUCOSE 132 (H) 09/26/2024    CRP 60.0 (H) 09/25/2024     ASSESSMENT:  - Cellulitis left foot  - Blister left foot  - PVD        PLAN:  - Patient was assessed and all findings discussed with the patient  - Wbc 7.4, ESR 50, CRP 60  - XR L foot:   1.  Osteopenia limits evaluation of fractures and erosions.  2.  Subtle osseous erosion versus subchondral cyst suggested over the dorsal  head of the  1st metatarsal. This finding is nonspecific and can be seen in  osteoarthritis or crystalline arthropathy.  In the absence of an overlying  skin ulceration, osteomyelitis is considered less likely.  Recommend direct  visualization for determination of skin integrity.  - Arterial studies: Pending  - MRI ordered  - Culture obtained and pending  - Abx per IM/ID  - No surgical intervention at this time per podiatry. Will review mri findings.   - Discussed with Dr. Hogue  - Will continue to follow while in house      Josh Gibbs DPM PGY-3  9/26/2024   12:11 PM

## 2024-09-26 NOTE — PROGRESS NOTES
Progress  Note  Chief Complaint   Patient presents with    Shortness of Breath     Pt arrived at 81% only because she didn't wear home oxygen on the way here. Wears 3.5 liters at home.      Historical Issues:  Current Facility-Administered Medications   Medication Dose Route Frequency Provider Last Rate Last Admin    sodium chloride flush 0.9 % injection 5-40 mL  5-40 mL IntraVENous 2 times per day Maki Harvey APRN - CNP   10 mL at 09/26/24 0819    sodium chloride flush 0.9 % injection 5-40 mL  5-40 mL IntraVENous PRN Maki Harvey APRN - CNP        0.9 % sodium chloride infusion   IntraVENous PRN Maki Harvey APRN - CNP        heparin (porcine) injection 5,000 Units  5,000 Units SubCUTAneous 3 times per day Maki Harvey APRN - CNP   5,000 Units at 09/26/24 0625    polyethylene glycol (GLYCOLAX) packet 17 g  17 g Oral Daily PRN Maki Harvey APRN - CNP        acetaminophen (TYLENOL) tablet 650 mg  650 mg Oral Q6H PRN Maki Harvey APRN - CNP   650 mg at 09/25/24 1649    Or    acetaminophen (TYLENOL) suppository 650 mg  650 mg Rectal Q6H PRN Maki Harvey APRN - CNP        atorvastatin (LIPITOR) tablet 40 mg  40 mg Oral [facility] Maki Harvey APRN - CNP   40 mg at 09/26/24 0817    calcium acetate (PHOSLO) capsule 1,334 mg  2 capsule Oral TID  Maki Harvey APRN - CNP   1,334 mg at 09/26/24 1130    insulin glargine (LANTUS) injection vial 15 Units  15 Units SubCUTAneous [facility] Maki Harvey APRN - CNP   15 Units at 09/26/24 0817    midodrine (PROAMATINE) tablet 10 mg  10 mg Oral BID PRN Maki Harvey APRN - CNP        insulin lispro (HUMALOG,ADMELOG) injection vial 0-4 Units  0-4 Units SubCUTAneous TID  Maki Harvey APRN - CNP   1 Units at 09/26/24 1131    insulin lispro (HUMALOG,ADMELOG) injection vial 0-4 Units  0-4 Units SubCUTAneous Nightly Maki Harvey APRN - CNP   4 Units at 09/25/24 2306    prochlorperazine (COMPAZINE) tablet 10 mg  10 mg

## 2024-09-26 NOTE — PROGRESS NOTES
Wound care consulted for this pt with foot wound. Podiatry is following. Will defer treatment to them.

## 2024-09-26 NOTE — PROGRESS NOTES
Pharmacy Consultation Note  (Antibiotic Dosing and Monitoring)    Initial consult date: 9/25/2024   Consulting physician/provider: Maki Harvey APRN - CNP   Drug: Vancomycin  Indication: SSTI    Age/  Gender Height Weight IBW  Allergy Information   69 y.o./female 162.6 cm (5' 4\") 113.4 kg (250 lb)     Ideal body weight: 54.7 kg (120 lb 9.5 oz)  Adjusted ideal body weight: 78.2 kg (172 lb 5.7 oz)   Pcn [penicillins], Morphine, Sodium hypochlorite, and Tessalon [benzonatate]      Renal Function:  Recent Labs     09/24/24  1812 09/25/24  0728 09/26/24  0740   BUN 80* 84* 38*   CREATININE 8.7* 9.4* 5.4*       Intake/Output Summary (Last 24 hours) at 9/26/2024 1527  Last data filed at 9/26/2024 0830  Gross per 24 hour   Intake 480 ml   Output 2600 ml   Net -2120 ml       Vancomycin Monitoring:  Trough:  No results for input(s): \"VANCOTROUGH\" in the last 72 hours.  Random:    Recent Labs     09/26/24  0740   VANCORANDOM 30.4       Recent vancomycin administrations                     vancomycin (VANCOCIN) 750 mg in sodium chloride 0.9 % 250 mL IVPB (mg) 750 mg New Bag 09/25/24 2340    vancomycin (VANCOCIN) 2,500 mg in sodium chloride 0.9 % 500 mL IVPB (mg) 2,500 mg New Bag 09/25/24 0157             Assessment:  Patient is a 69 y.o. female who has been initiated on vancomycin.  Estimated Creatinine Clearance: 12 mL/min (A) (based on SCr of 5.4 mg/dL (H)).  To dose vancomycin, pharmacy will be utilizing dosing based off of levels due to patient requiring hemodialysis- T/S/S schedule.   Random level 30.4 mcg/mL.     Plan:  No vancomycin today.   Random level ordered tomorrow with AM labs in order to determine further vancomycin dosing.   Pharmacy to follow.      Electronically signed by Breonna Reina RPH, Pharm.D. 9/26/2024 3:27 PM   Ext: [phone]    JODI: [phone]  SEY: [phone]  SJW: [phone]

## 2024-09-26 NOTE — PROGRESS NOTES
[facility]  MD Sidney Amezcua, MD Melissa Morel, CNP   Jaja Echavarria, ANP  Priya Hope, CASH  Progress Note    9/26/2024    SUBJECTIVE:   9/26: Sitting up in the chair, no acute distress. No longer having nausea or dry heaves. Denies chest pain or palpitations. Does have mild dyspnea.     PROBLEM LIST:    Principal Problem:    Wound infection  Active Problems:    ESRD on hemodialysis (HCC)    Hyperkalemia    Fluid overload    Diabetic ulcer of left great toe (HCC)    Multiple open wounds  Resolved Problems:    * No resolved hospital problems. *         DIET:    ADULT DIET; Regular; 4 carb choices (60 gm/meal); Low Potassium (Less than 3000 mg/day)     MEDS (scheduled):    sodium chloride flush  5-40 mL IntraVENous 2 times per day    heparin (porcine)  5,000 Units SubCUTAneous 3 times per day    atorvastatin  40 mg Oral QAM    calcium acetate  2 capsule Oral TID WC    insulin glargine  15 Units SubCUTAneous QAM    insulin lispro  0-4 Units SubCUTAneous TID WC    insulin lispro  0-4 Units SubCUTAneous Nightly    cefepime  1,000 mg IntraVENous Q24H    vancomycin (VANCOCIN) intermittent dosing (placeholder)   Other RX Placeholder       MEDS (infusions):   sodium chloride         MEDS (prn):  sodium chloride flush, sodium chloride, polyethylene glycol, acetaminophen **OR** acetaminophen, midodrine, prochlorperazine **OR** prochlorperazine, diphenhydrAMINE    PHYSICAL EXAM:     Patient Vitals for the past 24 hrs:   BP Temp Temp src Pulse Resp SpO2   09/26/24 0730 118/60 97.7 °F (36.5 °C) Oral 73 18 --   09/26/24 0643 130/60 98.1 °F (36.7 °C) Oral 75 18 94 %   09/25/24 1847 97/70 97.8 °F (36.6 °C) Oral 68 18 96 %   09/25/24 1757 (!) 86/53 97.5 °F (36.4 °C) Oral 77 18 95 %   09/25/24 1648 122/72 -- -- -- -- --   09/25/24 1619 (!) 136/92 -- -- 74 18 99 %   09/25/24 1547 122/64 97.4 °F (36.3 °C) -- 64 16 --   @      Intake/Output Summary (Last 24 hours) at  9/26/2024 1335  Last data filed at 9/25/2024 1547  Gross per 24 hour   Intake 300 ml   Output 2600 ml   Net -2300 ml         Wt Readings from Last 3 Encounters:   09/24/24 113.4 kg (250 lb)   08/15/24 118.4 kg (261 lb)   08/08/24 114.3 kg (251 lb 15.8 oz)       Constitutional:  in no acute distress  HEENT: NC/AT, EOMI, sclera and conjunctiva are clear and anicteric, mucus membranes moist  Neck: Trachea midline, no JVD  Cardiovascular: S1, S2 regular rhythm, no murmur,or rub  Respiratory:  CTAB, diminished in the bases. No crackles, no wheeze  Gastrointestinal:  Soft, nontender, distended, NABS  Ext: +2 dependent edema, erythematous with bilateral wounds, feet warm  Skin: dry, no rash  Neuro: awake, alert, interactive      DATA:    Recent Labs     09/24/24  1812 09/26/24  0740   WBC 7.4 7.2   HGB 10.4* 10.3*   HCT 32.5* 32.1*   .6* 101.9*     Recent Labs     09/24/24  1812 09/25/24  0728 09/26/24  0740    137 138   K 5.5* 5.3* 4.1   CL 89* 91* 90*   CO2 26 25 30*   PHOS  --   --  5.1*   BUN 80* 84* 38*   CREATININE 8.7* 9.4* 5.4*   ALT 11 10  --    AST 22 16  --    BILITOT 0.3 0.3  --    ALKPHOS 77 69  --        No results found for: \"LABPROT\"    Assessment  End Stage Renal Disease requiring hemodialysis caused by diabetic nephropathy and renal microvascular atherosclerotic disease  Hyperkalemia- missed dialysis treatment yesterday  Volumes overload  Hypertension  Anemia due to CKD  Secondary hyperparathyroidism/Bone mineral disease  Ulcer left great toe  Multiple open wounds BLE    For an MRI of her foot to r/o osteomyelitis      Plan  Continue IHD for removal of solutes and volume on a TTS schedule  Additional dialysis treatments on non-dialysis for removal of volume and to achieve dry weight as warranted  No need for JHONATAN- hgb > 10  Monitor labs  Monitor I & O  Monitor BP  Low K diet  Continue supportive renal care    Electronically signed by TAWNYA Hand CNP on 9/26/2024 at 1:35 PM

## 2024-09-27 ENCOUNTER — APPOINTMENT (OUTPATIENT)
Dept: MRI IMAGING | Age: 69
End: 2024-09-27
Payer: MEDICARE

## 2024-09-27 ENCOUNTER — APPOINTMENT (OUTPATIENT)
Age: 69
End: 2024-09-27
Attending: INTERNAL MEDICINE
Payer: MEDICARE

## 2024-09-27 ENCOUNTER — APPOINTMENT (OUTPATIENT)
Dept: ULTRASOUND IMAGING | Age: 69
End: 2024-09-27
Payer: MEDICARE

## 2024-09-27 LAB
25(OH)D3 SERPL-MCNC: 12.3 NG/ML (ref 30–100)
ANION GAP SERPL CALCULATED.3IONS-SCNC: 15 MMOL/L (ref 7–16)
BASOPHILS # BLD: 0.05 K/UL (ref 0–0.2)
BASOPHILS NFR BLD: 1 % (ref 0–2)
BUN SERPL-MCNC: 23 MG/DL (ref 6–23)
CALCIUM SERPL-MCNC: 8.5 MG/DL (ref 8.6–10.2)
CHLORIDE SERPL-SCNC: 92 MMOL/L (ref 98–107)
CO2 SERPL-SCNC: 29 MMOL/L (ref 22–29)
CREAT SERPL-MCNC: 4.3 MG/DL (ref 0.5–1)
EOSINOPHIL # BLD: 0.41 K/UL (ref 0.05–0.5)
EOSINOPHILS RELATIVE PERCENT: 6 % (ref 0–6)
ERYTHROCYTE [DISTWIDTH] IN BLOOD BY AUTOMATED COUNT: 15.2 % (ref 11.5–15)
GFR, ESTIMATED: 11 ML/MIN/1.73M2
GLUCOSE BLD-MCNC: 177 MG/DL (ref 74–99)
GLUCOSE BLD-MCNC: 192 MG/DL (ref 74–99)
GLUCOSE BLD-MCNC: 193 MG/DL (ref 74–99)
GLUCOSE BLD-MCNC: 217 MG/DL (ref 74–99)
GLUCOSE SERPL-MCNC: 178 MG/DL (ref 74–99)
HCT VFR BLD AUTO: 32.9 % (ref 34–48)
HGB BLD-MCNC: 10.2 G/DL (ref 11.5–15.5)
IMM GRANULOCYTES # BLD AUTO: 0.04 K/UL (ref 0–0.58)
IMM GRANULOCYTES NFR BLD: 1 % (ref 0–5)
LYMPHOCYTES NFR BLD: 0.89 K/UL (ref 1.5–4)
LYMPHOCYTES RELATIVE PERCENT: 13 % (ref 20–42)
MAGNESIUM SERPL-MCNC: 2.2 MG/DL (ref 1.6–2.6)
MCH RBC QN AUTO: 31.9 PG (ref 26–35)
MCHC RBC AUTO-ENTMCNC: 31 G/DL (ref 32–34.5)
MCV RBC AUTO: 102.8 FL (ref 80–99.9)
MICROORGANISM SPEC CULT: ABNORMAL
MICROORGANISM/AGENT SPEC: ABNORMAL
MONOCYTES NFR BLD: 0.65 K/UL (ref 0.1–0.95)
MONOCYTES NFR BLD: 10 % (ref 2–12)
NEUTROPHILS NFR BLD: 70 % (ref 43–80)
NEUTS SEG NFR BLD: 4.75 K/UL (ref 1.8–7.3)
PLATELET, FLUORESCENCE: 120 K/UL (ref 130–450)
PMV BLD AUTO: 11.2 FL (ref 7–12)
POTASSIUM SERPL-SCNC: 3.5 MMOL/L (ref 3.5–5)
RBC # BLD AUTO: 3.2 M/UL (ref 3.5–5.5)
SODIUM SERPL-SCNC: 136 MMOL/L (ref 132–146)
SPECIMEN DESCRIPTION: ABNORMAL
TSH SERPL DL<=0.05 MIU/L-ACNC: 2.27 UIU/ML (ref 0.27–4.2)
VANCOMYCIN SERPL-MCNC: 21.3 UG/ML (ref 5–40)
WBC OTHER # BLD: 6.8 K/UL (ref 4.5–11.5)

## 2024-09-27 PROCEDURE — 83735 ASSAY OF MAGNESIUM: CPT

## 2024-09-27 PROCEDURE — 73718 MRI LOWER EXTREMITY W/O DYE: CPT

## 2024-09-27 PROCEDURE — C8929 TTE W OR WO FOL WCON,DOPPLER: HCPCS

## 2024-09-27 PROCEDURE — 2500000003 HC RX 250 WO HCPCS: Performed by: NURSE PRACTITIONER

## 2024-09-27 PROCEDURE — 2580000003 HC RX 258: Performed by: NURSE PRACTITIONER

## 2024-09-27 PROCEDURE — 85025 COMPLETE CBC W/AUTO DIFF WBC: CPT

## 2024-09-27 PROCEDURE — 6360000004 HC RX CONTRAST MEDICATION: Performed by: INTERNAL MEDICINE

## 2024-09-27 PROCEDURE — 6360000002 HC RX W HCPCS: Performed by: NURSE PRACTITIONER

## 2024-09-27 PROCEDURE — 93923 UPR/LXTR ART STDY 3+ LVLS: CPT

## 2024-09-27 PROCEDURE — 6370000000 HC RX 637 (ALT 250 FOR IP): Performed by: NURSE PRACTITIONER

## 2024-09-27 PROCEDURE — 82306 VITAMIN D 25 HYDROXY: CPT

## 2024-09-27 PROCEDURE — 80048 BASIC METABOLIC PNL TOTAL CA: CPT

## 2024-09-27 PROCEDURE — 84443 ASSAY THYROID STIM HORMONE: CPT

## 2024-09-27 PROCEDURE — 1200000000 HC SEMI PRIVATE

## 2024-09-27 PROCEDURE — 99233 SBSQ HOSP IP/OBS HIGH 50: CPT | Performed by: INTERNAL MEDICINE

## 2024-09-27 PROCEDURE — 36415 COLL VENOUS BLD VENIPUNCTURE: CPT

## 2024-09-27 PROCEDURE — 80202 ASSAY OF VANCOMYCIN: CPT

## 2024-09-27 PROCEDURE — 82962 GLUCOSE BLOOD TEST: CPT

## 2024-09-27 RX ADMIN — ACETAMINOPHEN 650 MG: 325 TABLET ORAL at 22:42

## 2024-09-27 RX ADMIN — INSULIN LISPRO 1 UNITS: 100 INJECTION, SOLUTION INTRAVENOUS; SUBCUTANEOUS at 16:39

## 2024-09-27 RX ADMIN — DIPHENHYDRAMINE HCL 25 MG: 25 TABLET ORAL at 01:55

## 2024-09-27 RX ADMIN — SODIUM CHLORIDE, PRESERVATIVE FREE 10 ML: 5 INJECTION INTRAVENOUS at 20:36

## 2024-09-27 RX ADMIN — HEPARIN SODIUM 5000 UNITS: 5000 INJECTION INTRAVENOUS; SUBCUTANEOUS at 22:43

## 2024-09-27 RX ADMIN — CEFEPIME 1000 MG: 1 INJECTION, POWDER, FOR SOLUTION INTRAMUSCULAR; INTRAVENOUS at 17:49

## 2024-09-27 RX ADMIN — CALCIUM ACETATE 1334 MG: 667 CAPSULE ORAL at 11:28

## 2024-09-27 RX ADMIN — CALCIUM ACETATE 1334 MG: 667 CAPSULE ORAL at 09:51

## 2024-09-27 RX ADMIN — CALCIUM ACETATE 1334 MG: 667 CAPSULE ORAL at 16:41

## 2024-09-27 RX ADMIN — HEPARIN SODIUM 5000 UNITS: 5000 INJECTION INTRAVENOUS; SUBCUTANEOUS at 05:43

## 2024-09-27 RX ADMIN — PERFLUTREN 1.5 ML: 6.52 INJECTION, SUSPENSION INTRAVENOUS at 09:21

## 2024-09-27 RX ADMIN — ATORVASTATIN CALCIUM 40 MG: 40 TABLET, FILM COATED ORAL at 09:51

## 2024-09-27 RX ADMIN — HEPARIN SODIUM 5000 UNITS: 5000 INJECTION INTRAVENOUS; SUBCUTANEOUS at 14:12

## 2024-09-27 RX ADMIN — INSULIN GLARGINE 15 UNITS: 100 INJECTION, SOLUTION SUBCUTANEOUS at 09:52

## 2024-09-27 RX ADMIN — ACETAMINOPHEN 650 MG: 325 TABLET ORAL at 01:53

## 2024-09-27 RX ADMIN — SODIUM CHLORIDE, PRESERVATIVE FREE 10 ML: 5 INJECTION INTRAVENOUS at 09:53

## 2024-09-27 ASSESSMENT — PAIN - FUNCTIONAL ASSESSMENT: PAIN_FUNCTIONAL_ASSESSMENT: ACTIVITIES ARE NOT PREVENTED

## 2024-09-27 ASSESSMENT — PAIN DESCRIPTION - DESCRIPTORS
DESCRIPTORS: ACHING
DESCRIPTORS: ACHING;SORE

## 2024-09-27 ASSESSMENT — PAIN SCALES - GENERAL
PAINLEVEL_OUTOF10: 6
PAINLEVEL_OUTOF10: 0
PAINLEVEL_OUTOF10: 3

## 2024-09-27 ASSESSMENT — PAIN DESCRIPTION - LOCATION
LOCATION: NECK
LOCATION: BACK;SHOULDER

## 2024-09-27 ASSESSMENT — PAIN DESCRIPTION - ORIENTATION: ORIENTATION: MID

## 2024-09-27 NOTE — PROGRESS NOTES
Progress  Note  Chief Complaint   Patient presents with    Shortness of Breath     Pt arrived at 81% only because she didn't wear home oxygen on the way here. Wears 3.5 liters at home.      Historical Issues:  Current Facility-Administered Medications   Medication Dose Route Frequency Provider Last Rate Last Admin    sodium chloride flush 0.9 % injection 5-40 mL  5-40 mL IntraVENous 2 times per day Maki Harvey APRN - CNP   10 mL at 09/27/24 0953    sodium chloride flush 0.9 % injection 5-40 mL  5-40 mL IntraVENous PRN Maki Harvey APRN - CNP        0.9 % sodium chloride infusion   IntraVENous PRN Maki Harvey APRN - CNP        heparin (porcine) injection 5,000 Units  5,000 Units SubCUTAneous 3 times per day Maki Harvey APRN - CNP   5,000 Units at 09/27/24 1412    polyethylene glycol (GLYCOLAX) packet 17 g  17 g Oral Daily PRN Maki Harvey APRN - CNP        acetaminophen (TYLENOL) tablet 650 mg  650 mg Oral Q6H PRN Maki Harvey APRN - CNP   650 mg at 09/27/24 0153    Or    acetaminophen (TYLENOL) suppository 650 mg  650 mg Rectal Q6H PRN Maki Harvey APRN - CNP        atorvastatin (LIPITOR) tablet 40 mg  40 mg Oral [facility] Maki Harvey APRN - CNP   40 mg at 09/27/24 0951    calcium acetate (PHOSLO) capsule 1,334 mg  2 capsule Oral TID  Maki Harvey APRN - CNP   1,334 mg at 09/27/24 1128    insulin glargine (LANTUS) injection vial 15 Units  15 Units SubCUTAneous [facility] Maki Harvey APRN - CNP   15 Units at 09/27/24 0952    midodrine (PROAMATINE) tablet 10 mg  10 mg Oral BID PRN Maki Harvey APRN - CNP        insulin lispro (HUMALOG,ADMELOG) injection vial 0-4 Units  0-4 Units SubCUTAneous TID  Maki Harvey APRN - CNP   1 Units at 09/26/24 1131    insulin lispro (HUMALOG,ADMELOG) injection vial 0-4 Units  0-4 Units SubCUTAneous Nightly Maki Harvey APRN - CNP   4 Units at 09/25/24 2306    prochlorperazine (COMPAZINE) tablet 10 mg  10 mg

## 2024-09-27 NOTE — PROGRESS NOTES
Podiatry Progress Note  9/27/2024   Lizette Murray       SUBJECTIVE: This is a 69 y.o. female seen bedside for left great toe wound. Patient states she noticed a blister on her great toe recently and bloody drainage coming from the great toe.  Patient also complains of swelling to bilateral lower extremity.  Patient previously lost her  and gained excessive amount of weight after.  Patient denies n,v,f,c,d at this time. Patient has no other pedal complaints.       OBJECTIVE:      Pt is AAOx3    Vitals:    09/27/24 1123   BP: (!) 96/58   Pulse:    Resp:    Temp:    SpO2:       EXAM:      Vascular Exam:  DP and PT pulses are faintly palpable. CFT <5 seconds to digits. Skin temp is warm to warm from proximal to distal.     Neuro Exam:  Light touch sensation is intact     Dermatologic Exam:  There is a fluid filled blister noted to dorsolateral left hallux. There is surrounding erythema and edema. There is no malodor or purulence noted. There is milld fluctuance noted to the area. Wound does not probe to bone.     MSK: Deferred         Current Facility-Administered Medications   Medication Dose Route Frequency Provider Last Rate Last Admin    sodium chloride flush 0.9 % injection 5-40 mL  5-40 mL IntraVENous 2 times per day Maki Harvey APRN - CNP   10 mL at 09/27/24 0953    sodium chloride flush 0.9 % injection 5-40 mL  5-40 mL IntraVENous PRN Maki Harvey APRN - CNP        0.9 % sodium chloride infusion   IntraVENous PRN Maki Harvey APRN - CNP        heparin (porcine) injection 5,000 Units  5,000 Units SubCUTAneous 3 times per day Maki Harvey APRN - CNP   5,000 Units at 09/27/24 1412    polyethylene glycol (GLYCOLAX) packet 17 g  17 g Oral Daily PRN Maki Harvey APRN - CNP        acetaminophen (TYLENOL) tablet 650 mg  650 mg Oral Q6H PRN Maki Harvey APRN - CNP   650 mg at 09/27/24 0153    Or    acetaminophen (TYLENOL) suppository 650 mg  650 mg Rectal Q6H PRN  limits evaluation of fractures and erosions.  2.  Subtle osseous erosion versus subchondral cyst suggested over the dorsal  head of the 1st metatarsal. This finding is nonspecific and can be seen in  osteoarthritis or crystalline arthropathy.  In the absence of an overlying  skin ulceration, osteomyelitis is considered less likely.  Recommend direct  visualization for determination of skin integrity.  - Arterial studies: TUYET bilaterally 1.6 on right, 1.5 on left.  Multiphasic waveforms bilaterally possible noncompressibility.  Digital waveforms suggest irregular possibility decreased amplitude.  - MRI: No suggestion of osteomyelitis at this time, soft tissue edema to dorsum of forefoot.  - Culture: Staphylococcus stimulants, Staphylococcus epidermidis, staph urealyticus  - Abx per IM/ID, adjust for culture results  - No surgical intervention at this time per podiatry.  Recommend local wound care at this time.   - Discussed with Dr. Hogue  - Will continue to follow while in house      Daryl Murphy DPM PGY-1  9/27/2024   5:25 PM

## 2024-09-27 NOTE — PROGRESS NOTES
Spiritual Health History and Assessment/Progress Note  [facility]     Encounter, Rituals, Rites and Sacraments,  ,  ,      Name: Lizette Murray MRN: [id number]    Age: 69 y.o.     Sex: female   Language: English   Jainism: Confucianism   Wound infection     Date: 9/27/2024                           Spiritual Assessment began in [facility] MED SURG/TELE        Referral/Consult From: Rounding   Encounter Overview/Reason:  Encounter, Rituals, Rites and Sacraments  Service Provided For: Patient    Aide, Belief, Meaning:   Patient is connected with a aide tradition or spiritual practice  Family/Friends No family/friends present      Importance and Influence:  Patient has no beliefs influential to healthcare decision-making identified during this visit  Family/Friends No family/friends present    Community:  Patient feels well-supported. Support system includes: Children  Family/Friends No family/friends present    Assessment and Plan of Care:     Patient Interventions include: Provided sacramental/Rastafari ritual  Family/Friends Interventions include: No family/friends present    Patient Plan of Care: Spiritual Care available upon further referral  Family/Friends Plan of Care: Spiritual Care available upon further referral    Electronically signed by Chaplain Mildred on 9/27/2024 at 3:57 PM

## 2024-09-27 NOTE — PROGRESS NOTES
Pharmacy Consultation Note  (Antibiotic Dosing and Monitoring)    Initial consult date: 9/25/2024   Consulting physician/provider: Maki Harvey APRN - CNP   Drug: Vancomycin  Indication: SSTI    Age/  Gender Height Weight IBW  Allergy Information   69 y.o./female 162.6 cm (5' 4\") 113.4 kg (250 lb)     Ideal body weight: 54.7 kg (120 lb 9.5 oz)  Adjusted ideal body weight: 76.8 kg (169 lb 5.7 oz)   Pcn [penicillins], Morphine, Sodium hypochlorite, and Tessalon [benzonatate]      Renal Function:  Recent Labs     09/25/24  0728 09/26/24  0740 09/27/24  0507   BUN 84* 38* 23   CREATININE 9.4* 5.4* 4.3*       Intake/Output Summary (Last 24 hours) at 9/27/2024 1036  Last data filed at 9/27/2024 0953  Gross per 24 hour   Intake 310 ml   Output 2800 ml   Net -2490 ml       Vancomycin Monitoring:  Trough:  No results for input(s): \"VANCOTROUGH\" in the last 72 hours.  Random:    Recent Labs     09/26/24  0740 09/27/24  0507   VANCORANDOM 30.4 21.3       Recent vancomycin administrations                     vancomycin (VANCOCIN) 750 mg in sodium chloride 0.9 % 250 mL IVPB (mg) 750 mg New Bag 09/25/24 2340    vancomycin (VANCOCIN) 2,500 mg in sodium chloride 0.9 % 500 mL IVPB (mg) 2,500 mg New Bag 09/25/24 0157             Assessment:  Patient is a 69 y.o. female who has been initiated on vancomycin.  Estimated Creatinine Clearance: 15 mL/min (A) (based on SCr of 4.3 mg/dL (H)).  To dose vancomycin, pharmacy will be utilizing dosing based off of levels due to patient requiring hemodialysis- T/S/S schedule.   Vancomycin level on 9/27 is 21.3 mcg/mL.  Nephrology note from 9/26 will continue TTS HD schedule (patient had HD on 9/26)    Plan:  No vancomycin today.   Will re-order vancomycin after next HD session.  Will monitor nephrology notes and dialysis schedule to determine future vancomycin dosing plans.   Pharmacy to follow.      Electronically signed by Manuel Swartz RPH, Pharm.D. 9/27/2024 10:36 AM   Ext: [phone]    SEB:  [phone]  SEY: [phone]  [facility]: [phone]

## 2024-09-27 NOTE — PROGRESS NOTES
[facility]  MD Sidney Amezcua, MD Sedrick Jones, MD Melissa Jones, CNP   Jaja Echavarria, ANP  Priya Hope, CASH  Progress Note    9/27/2024    SUBJECTIVE:   9/26: Sitting up in the chair, no acute distress. No longer having nausea or dry heaves. Denies chest pain or palpitations. Does have mild dyspnea.     9/27: Sitting up in the chair. Feels better. Denies dyspnea. Had some nausea this morning. Vital signs are stable. Tolerated HD yesterday without issues.     PROBLEM LIST:    Principal Problem:    Wound infection  Active Problems:    ESRD on hemodialysis (HCC)    Hyperkalemia    Fluid overload    Diabetic ulcer of left great toe (HCC)    Multiple open wounds  Resolved Problems:    * No resolved hospital problems. *         DIET:    ADULT DIET; Regular; 4 carb choices (60 gm/meal); Low Potassium (Less than 3000 mg/day)     MEDS (scheduled):    sodium chloride flush  5-40 mL IntraVENous 2 times per day    heparin (porcine)  5,000 Units SubCUTAneous 3 times per day    atorvastatin  40 mg Oral QAM    calcium acetate  2 capsule Oral TID WC    insulin glargine  15 Units SubCUTAneous QAM    insulin lispro  0-4 Units SubCUTAneous TID WC    insulin lispro  0-4 Units SubCUTAneous Nightly    cefepime  1,000 mg IntraVENous Q24H    vancomycin (VANCOCIN) intermittent dosing (placeholder)   Other RX Placeholder       MEDS (infusions):   sodium chloride         MEDS (prn):  sodium chloride flush, sodium chloride, polyethylene glycol, acetaminophen **OR** acetaminophen, midodrine, prochlorperazine **OR** prochlorperazine, diphenhydrAMINE    PHYSICAL EXAM:     Patient Vitals for the past 24 hrs:   BP Temp Temp src Pulse Resp SpO2 Weight   09/27/24 1123 (!) 96/58 -- -- -- -- -- --   09/27/24 1100 (!) 96/45 -- -- 76 -- -- --   09/26/24 1945 112/67 -- -- 77 -- -- --   09/26/24 1841 (!) 94/32 98.6 °F (37 °C) Oral 77 18 100 % --   09/26/24 1601 (!) 106/38 98 °F (36.7 °C) -- 59 18 -- 110 kg  labs  Monitor I & O  Monitor BP  Low K diet  Continue supportive renal care    Electronically signed by TAWNYA Hand CNP on 9/27/2024 at 12:01 PM

## 2024-09-27 NOTE — PROGRESS NOTES
Sent page to Dr. Joseph to confirm receipt of consult.     11:21 AM Spoke with Dr. Joseph regarding consult, he will see her soon.

## 2024-09-27 NOTE — PLAN OF CARE
Problem: Discharge Planning  Goal: Discharge to home or other facility with appropriate resources  9/27/2024 1341 by Kaitlin Blank, RN  Outcome: Progressing  9/27/2024 0354 by Kellen Arellano, RN  Outcome: Progressing     Problem: Chronic Conditions and Co-morbidities  Goal: Patient's chronic conditions and co-morbidity symptoms are monitored and maintained or improved  9/27/2024 1341 by Kaitlin Blank, RN  Outcome: Progressing  9/27/2024 0354 by Kellen Arellano, RN  Outcome: Progressing     Problem: Skin/Tissue Integrity  Goal: Absence of new skin breakdown  Description: 1.  Monitor for areas of redness and/or skin breakdown  2.  Assess vascular access sites hourly  3.  Every 4-6 hours minimum:  Change oxygen saturation probe site  4.  Every 4-6 hours:  If on nasal continuous positive airway pressure, respiratory therapy assess nares and determine need for appliance change or resting period.  9/27/2024 1341 by Kaitlin Blank, RN  Outcome: Progressing  9/27/2024 0354 by Kellen Arellano, RN  Outcome: Progressing

## 2024-09-27 NOTE — CARE COORDINATION
9/27/2024  Social Work Discharge Planning:MRI this morning. ESRD. Neph and wound care are following.  Pt and daughter Meghann reside together in a one story home and Pt uses a ww and wc for long distances. Pt is on 3l o2 here and uses 2l via [facility]. Pt goes to [facility] [phone] at 5:30am. Daughter transports Pt there. Electronically signed by HILDA Guevara on 9/27/2024 at 10:06 AM

## 2024-09-28 VITALS
SYSTOLIC BLOOD PRESSURE: 109 MMHG | DIASTOLIC BLOOD PRESSURE: 43 MMHG | HEIGHT: 64 IN | TEMPERATURE: 98 F | OXYGEN SATURATION: 98 % | BODY MASS INDEX: 40.76 KG/M2 | RESPIRATION RATE: 16 BRPM | WEIGHT: 238.76 LBS | HEART RATE: 71 BPM

## 2024-09-28 PROBLEM — I35.0 AORTIC STENOSIS, SEVERE: Status: ACTIVE | Noted: 2024-09-28

## 2024-09-28 LAB
ANION GAP SERPL CALCULATED.3IONS-SCNC: 18 MMOL/L (ref 7–16)
BASOPHILS # BLD: 0.05 K/UL (ref 0–0.2)
BASOPHILS NFR BLD: 1 % (ref 0–2)
BUN SERPL-MCNC: 40 MG/DL (ref 6–23)
CALCIUM SERPL-MCNC: 8.8 MG/DL (ref 8.6–10.2)
CHLORIDE SERPL-SCNC: 91 MMOL/L (ref 98–107)
CO2 SERPL-SCNC: 29 MMOL/L (ref 22–29)
CREAT SERPL-MCNC: 6 MG/DL (ref 0.5–1)
ECHO AO ASC DIAM: 2.9 CM
ECHO AO ASCENDING AORTA INDEX: 1.37 CM/M2
ECHO AO ROOT DIAM: 2.9 CM
ECHO AO ROOT INDEX: 1.37 CM/M2
ECHO AO SINUS VALSALVA DIAM: 2.7 CM
ECHO AO SINUS VALSALVA INDEX: 1.27 CM/M2
ECHO AR MAX VEL PISA: 3.6 M/S
ECHO AV AREA PEAK VELOCITY: 0.6 CM2
ECHO AV AREA VTI: 0.6 CM2
ECHO AV AREA/BSA PEAK VELOCITY: 0.3 CM2/M2
ECHO AV AREA/BSA VTI: 0.3 CM2/M2
ECHO AV CUSP MM: 0.7 CM
ECHO AV MEAN GRADIENT: 49 MMHG
ECHO AV MEAN VELOCITY: 3.4 M/S
ECHO AV PEAK GRADIENT: 80 MMHG
ECHO AV PEAK VELOCITY: 4.5 M/S
ECHO AV REGURGITANT PHT: 357.8 MILLISECOND
ECHO AV VELOCITY RATIO: 0.2
ECHO AV VTI: 118.9 CM
ECHO BSA: 2.26 M2
ECHO EST RA PRESSURE: 15 MMHG
ECHO LA DIAMETER INDEX: 1.93 CM/M2
ECHO LA DIAMETER: 4.1 CM
ECHO LA TO AORTIC ROOT RATIO: 1.41
ECHO LA VOL A-L A2C: 101 ML (ref 22–52)
ECHO LA VOL A-L A4C: 96 ML (ref 22–52)
ECHO LA VOL MOD A2C: 94 ML (ref 22–52)
ECHO LA VOL MOD A4C: 90 ML (ref 22–52)
ECHO LA VOLUME AREA LENGTH: 103 ML
ECHO LA VOLUME INDEX A-L A2C: 48 ML/M2 (ref 16–34)
ECHO LA VOLUME INDEX A-L A4C: 45 ML/M2 (ref 16–34)
ECHO LA VOLUME INDEX AREA LENGTH: 49 ML/M2 (ref 16–34)
ECHO LA VOLUME INDEX MOD A2C: 44 ML/M2 (ref 16–34)
ECHO LA VOLUME INDEX MOD A4C: 42 ML/M2 (ref 16–34)
ECHO LV E' LATERAL VELOCITY: 7 CM/S
ECHO LV E' SEPTAL VELOCITY: 4 CM/S
ECHO LV EDV A2C: 104 ML
ECHO LV EDV A4C: 126 ML
ECHO LV EDV BP: 114 ML (ref 56–104)
ECHO LV EDV INDEX A4C: 59 ML/M2
ECHO LV EDV INDEX BP: 54 ML/M2
ECHO LV EDV NDEX A2C: 49 ML/M2
ECHO LV EF PHYSICIAN: 42 %
ECHO LV EJECTION FRACTION A2C: 37 %
ECHO LV EJECTION FRACTION A4C: 38 %
ECHO LV EJECTION FRACTION BIPLANE: 36 % (ref 55–100)
ECHO LV ESV A2C: 65 ML
ECHO LV ESV A4C: 78 ML
ECHO LV ESV BP: 73 ML (ref 19–49)
ECHO LV ESV INDEX A2C: 31 ML/M2
ECHO LV ESV INDEX A4C: 37 ML/M2
ECHO LV ESV INDEX BP: 34 ML/M2
ECHO LV FRACTIONAL SHORTENING: 18 % (ref 28–44)
ECHO LV INTERNAL DIMENSION DIASTOLE INDEX: 2.64 CM/M2
ECHO LV INTERNAL DIMENSION DIASTOLIC: 5.6 CM (ref 3.9–5.3)
ECHO LV INTERNAL DIMENSION SYSTOLIC INDEX: 2.17 CM/M2
ECHO LV INTERNAL DIMENSION SYSTOLIC: 4.6 CM
ECHO LV ISOVOLUMETRIC RELAXATION TIME (IVRT): 69.2 MS
ECHO LV IVSD: 1 CM (ref 0.6–0.9)
ECHO LV IVSS: 1.1 CM
ECHO LV MASS 2D: 219.7 G (ref 67–162)
ECHO LV MASS INDEX 2D: 103.6 G/M2 (ref 43–95)
ECHO LV POSTERIOR WALL DIASTOLIC: 1 CM (ref 0.6–0.9)
ECHO LV POSTERIOR WALL SYSTOLIC: 1.2 CM
ECHO LV RELATIVE WALL THICKNESS RATIO: 0.36
ECHO LVOT AREA: 3.1 CM2
ECHO LVOT AV VTI INDEX: 0.19
ECHO LVOT DIAM: 2 CM
ECHO LVOT MEAN GRADIENT: 1 MMHG
ECHO LVOT PEAK GRADIENT: 3 MMHG
ECHO LVOT PEAK VELOCITY: 0.9 M/S
ECHO LVOT STROKE VOLUME INDEX: 32.6 ML/M2
ECHO LVOT SV: 69.1 ML
ECHO LVOT VTI: 22 CM
ECHO MV "A" WAVE DURATION: 110.7 MSEC
ECHO MV A VELOCITY: 0.87 M/S
ECHO MV AREA PHT: 2.1 CM2
ECHO MV AREA VTI: 2 CM2
ECHO MV E DECELERATION TIME (DT): 191.4 MS
ECHO MV E VELOCITY: 1 M/S
ECHO MV E/A RATIO: 1.15
ECHO MV E/E' LATERAL: 14.29
ECHO MV E/E' RATIO (AVERAGED): 19.64
ECHO MV E/E' SEPTAL: 25
ECHO MV LVOT VTI INDEX: 1.55
ECHO MV MAX VELOCITY: 1 M/S
ECHO MV MEAN GRADIENT: 2 MMHG
ECHO MV MEAN VELOCITY: 0.7 M/S
ECHO MV PEAK GRADIENT: 4 MMHG
ECHO MV PRESSURE HALF TIME (PHT): 103.2 MS
ECHO MV VTI: 34.1 CM
ECHO PV MAX VELOCITY: 1.2 M/S
ECHO PV MEAN GRADIENT: 3 MMHG
ECHO PV MEAN VELOCITY: 0.8 M/S
ECHO PV PEAK GRADIENT: 6 MMHG
ECHO PV VTI: 28 CM
ECHO PVEIN A DURATION: 106.1 MS
ECHO PVEIN A VELOCITY: 0.1 M/S
ECHO PVEIN PEAK D VELOCITY: 0.3 M/S
ECHO PVEIN PEAK S VELOCITY: 0.3 M/S
ECHO PVEIN S/D RATIO: 1
ECHO RIGHT VENTRICULAR SYSTOLIC PRESSURE (RVSP): 58 MMHG
ECHO RV INTERNAL DIMENSION: 3.5 CM
ECHO RV TAPSE: 2 CM (ref 1.7–?)
ECHO TV REGURGITANT MAX VELOCITY: 3.29 M/S
ECHO TV REGURGITANT PEAK GRADIENT: 43 MMHG
EOSINOPHIL # BLD: 0.44 K/UL (ref 0.05–0.5)
EOSINOPHILS RELATIVE PERCENT: 6 % (ref 0–6)
ERYTHROCYTE [DISTWIDTH] IN BLOOD BY AUTOMATED COUNT: 15.5 % (ref 11.5–15)
GFR, ESTIMATED: 7 ML/MIN/1.73M2
GLUCOSE BLD-MCNC: 190 MG/DL (ref 74–99)
GLUCOSE BLD-MCNC: 202 MG/DL (ref 74–99)
GLUCOSE BLD-MCNC: 222 MG/DL (ref 74–99)
GLUCOSE SERPL-MCNC: 191 MG/DL (ref 74–99)
HCT VFR BLD AUTO: 33.6 % (ref 34–48)
HGB BLD-MCNC: 10.5 G/DL (ref 11.5–15.5)
IMM GRANULOCYTES # BLD AUTO: 0.03 K/UL (ref 0–0.58)
IMM GRANULOCYTES NFR BLD: 0 % (ref 0–5)
LYMPHOCYTES NFR BLD: 0.83 K/UL (ref 1.5–4)
LYMPHOCYTES RELATIVE PERCENT: 12 % (ref 20–42)
MCH RBC QN AUTO: 32.1 PG (ref 26–35)
MCHC RBC AUTO-ENTMCNC: 31.3 G/DL (ref 32–34.5)
MCV RBC AUTO: 102.8 FL (ref 80–99.9)
MONOCYTES NFR BLD: 0.61 K/UL (ref 0.1–0.95)
MONOCYTES NFR BLD: 8 % (ref 2–12)
NEUTROPHILS NFR BLD: 73 % (ref 43–80)
NEUTS SEG NFR BLD: 5.27 K/UL (ref 1.8–7.3)
PLATELET # BLD AUTO: 132 K/UL (ref 130–450)
PMV BLD AUTO: 11.2 FL (ref 7–12)
POTASSIUM SERPL-SCNC: 4.3 MMOL/L (ref 3.5–5)
RBC # BLD AUTO: 3.27 M/UL (ref 3.5–5.5)
SODIUM SERPL-SCNC: 138 MMOL/L (ref 132–146)
WBC OTHER # BLD: 7.2 K/UL (ref 4.5–11.5)

## 2024-09-28 PROCEDURE — 6370000000 HC RX 637 (ALT 250 FOR IP): Performed by: INTERNAL MEDICINE

## 2024-09-28 PROCEDURE — 99239 HOSP IP/OBS DSCHRG MGMT >30: CPT | Performed by: INTERNAL MEDICINE

## 2024-09-28 PROCEDURE — 6360000002 HC RX W HCPCS: Performed by: NURSE PRACTITIONER

## 2024-09-28 PROCEDURE — 90935 HEMODIALYSIS ONE EVALUATION: CPT

## 2024-09-28 PROCEDURE — 2700000000 HC OXYGEN THERAPY PER DAY

## 2024-09-28 PROCEDURE — 6360000002 HC RX W HCPCS

## 2024-09-28 PROCEDURE — 82962 GLUCOSE BLOOD TEST: CPT

## 2024-09-28 PROCEDURE — 2500000003 HC RX 250 WO HCPCS: Performed by: NURSE PRACTITIONER

## 2024-09-28 PROCEDURE — 80048 BASIC METABOLIC PNL TOTAL CA: CPT

## 2024-09-28 PROCEDURE — 2580000003 HC RX 258: Performed by: NURSE PRACTITIONER

## 2024-09-28 PROCEDURE — 6370000000 HC RX 637 (ALT 250 FOR IP): Performed by: NURSE PRACTITIONER

## 2024-09-28 PROCEDURE — 85025 COMPLETE CBC W/AUTO DIFF WBC: CPT

## 2024-09-28 RX ORDER — DIPHENHYDRAMINE HYDROCHLORIDE 50 MG/ML
INJECTION INTRAMUSCULAR; INTRAVENOUS
Status: COMPLETED
Start: 2024-09-28 | End: 2024-09-28

## 2024-09-28 RX ORDER — METRONIDAZOLE 500 MG/1
500 TABLET ORAL EVERY 8 HOURS SCHEDULED
Qty: 42 TABLET | Refills: 0 | Status: SHIPPED | OUTPATIENT
Start: 2024-09-28 | End: 2024-10-12

## 2024-09-28 RX ORDER — DIPHENHYDRAMINE HYDROCHLORIDE 50 MG/ML
25 INJECTION INTRAMUSCULAR; INTRAVENOUS ONCE
Status: COMPLETED | OUTPATIENT
Start: 2024-09-28 | End: 2024-09-28

## 2024-09-28 RX ORDER — LEVOFLOXACIN 500 MG/1
500 TABLET, FILM COATED ORAL
Status: DISCONTINUED | OUTPATIENT
Start: 2024-09-28 | End: 2024-09-28 | Stop reason: HOSPADM

## 2024-09-28 RX ORDER — LEVOFLOXACIN 500 MG/1
TABLET, FILM COATED ORAL
Qty: 6 TABLET | Refills: 0 | Status: SHIPPED | OUTPATIENT
Start: 2024-09-30

## 2024-09-28 RX ORDER — METRONIDAZOLE 500 MG/1
500 TABLET ORAL EVERY 8 HOURS SCHEDULED
Status: DISCONTINUED | OUTPATIENT
Start: 2024-09-28 | End: 2024-09-28 | Stop reason: HOSPADM

## 2024-09-28 RX ADMIN — INSULIN GLARGINE 15 UNITS: 100 INJECTION, SOLUTION SUBCUTANEOUS at 12:04

## 2024-09-28 RX ADMIN — METRONIDAZOLE 500 MG: 500 TABLET ORAL at 14:33

## 2024-09-28 RX ADMIN — CALCIUM ACETATE 1334 MG: 667 CAPSULE ORAL at 06:34

## 2024-09-28 RX ADMIN — INSULIN LISPRO 1 UNITS: 100 INJECTION, SOLUTION INTRAVENOUS; SUBCUTANEOUS at 12:04

## 2024-09-28 RX ADMIN — MIDODRINE HYDROCHLORIDE 10 MG: 5 TABLET ORAL at 06:33

## 2024-09-28 RX ADMIN — PROCHLORPERAZINE EDISYLATE 10 MG: 5 INJECTION INTRAMUSCULAR; INTRAVENOUS at 02:29

## 2024-09-28 RX ADMIN — LEVOFLOXACIN 500 MG: 500 TABLET, FILM COATED ORAL at 12:04

## 2024-09-28 RX ADMIN — CALCIUM ACETATE 1334 MG: 667 CAPSULE ORAL at 12:04

## 2024-09-28 RX ADMIN — SODIUM CHLORIDE, PRESERVATIVE FREE 10 ML: 5 INJECTION INTRAVENOUS at 12:11

## 2024-09-28 RX ADMIN — VANCOMYCIN HYDROCHLORIDE 1500 MG: 10 INJECTION, POWDER, LYOPHILIZED, FOR SOLUTION INTRAVENOUS at 12:54

## 2024-09-28 RX ADMIN — DIPHENHYDRAMINE HYDROCHLORIDE 25 MG: 50 INJECTION INTRAMUSCULAR; INTRAVENOUS at 07:58

## 2024-09-28 RX ADMIN — HEPARIN SODIUM 5000 UNITS: 5000 INJECTION INTRAVENOUS; SUBCUTANEOUS at 14:33

## 2024-09-28 RX ADMIN — ATORVASTATIN CALCIUM 40 MG: 40 TABLET, FILM COATED ORAL at 12:04

## 2024-09-28 RX ADMIN — HEPARIN SODIUM 5000 UNITS: 5000 INJECTION INTRAVENOUS; SUBCUTANEOUS at 06:33

## 2024-09-28 ASSESSMENT — PAIN SCALES - GENERAL
PAINLEVEL_OUTOF10: 0
PAINLEVEL_OUTOF10: 0

## 2024-09-28 NOTE — CONSULTS
CARDIOLOGY CONSULTATION    Patient Name:  Lizette Murray    :  1955    Reason for Consultation:   ***    History of Present Illness:   Lizette Murray presents to [facility]  - ***    Past Medical History:   has a past medical history of Acute on chronic heart failure with preserved ejection fraction (HCC), Acute pulmonary embolism (HCC), Anemia due to stage 5 chronic kidney disease, not on chronic dialysis (HCC), Anemia in CKD (chronic kidney disease), Anxiety and depression, Aortic stenosis, mild, At high risk for falls, Brain aneurysm, CLABSI (central line-associated bloodstream infection), Cough, Diabetes mellitus (HCC), Diabetes mellitus type 2 with complications (HCC), Dizziness on standing, Encounter regarding vascular access for dialysis for ESRD (HCC), End-stage renal disease on hemodialysis (HCC), ESRD (end stage renal disease) (HCC), ESRD on hemodialysis (HCC), Essential hypertension, Fever, unspecified, Gastrointestinal hemorrhage, H/O heart artery stent, History of Clostridioides difficile colitis, History of pulmonary embolus (PE), Hyperlipidemia, Hypertension, Leg swelling, Lymphedema of both lower extremities, Mild pulmonary hypertension (HCC), MSSA bacteremia, Nausea & vomiting, Obesity, Class III, BMI 40-49.9 (morbid obesity), Periumbilical abdominal pain, and S/P insertion of inferior vena caval filter.    Surgical History:   has a past surgical history that includes Cardiac surgery;  section; vascular surgery (N/A, 2021); Upper gastrointestinal endoscopy (N/A, 2021); Dialysis fistula creation (Left, 2022); Colonoscopy (); Cardiac catheterization (); Percutaneous Transluminal Coronary Angio (); Upper gastrointestinal endoscopy (N/A, 2023); IVC filter insertion (2023); Upper gastrointestinal endoscopy (N/A, 02/10/2023); eye surgery (Bilateral, 2023); and invasive vascular (N/A, 2024).     Social History:   reports that she quit  Acute cough R05.1    H/O heart artery stent Z95.5    Diabetes mellitus (HCC) E11.9    Hematemesis K92.0    ESRD on hemodialysis (HCC) N18.6, Z99.2    Leg swelling M79.89    Lymphedema of both lower extremities I89.0    Rectal bleeding K62.5    BRBPR (bright red blood per rectum) K62.5    Aortic stenosis, mild I35.0    Anemia due to stage 5 chronic kidney disease (HCC) N18.5, D63.1    History of pulmonary embolus (PE) Z86.711    Hyperkalemia E87.5    Pneumonia due to organism J18.9    Gallstones K80.20    Acute on chronic respiratory failure (HCC) J96.20    Dyspnea R06.00    Fluid overload E87.70    Macrocytosis D75.89    Chronic anemia D64.9    End stage renal disease (HCC) N18.6    Mediastinal lymphadenopathy R59.0    Coronary artery disease of native artery of native heart with stable angina pectoris ([facility]) I25.118    Encounter regarding vascular access for dialysis for ESRD ([facility]) N18.6, Z99.2    ESRD (end stage renal disease) ([facility]) N18.6    Wound infection T14.8XXA, L08.9    Diabetic ulcer of left great toe (HCC) E11.621, L97.529    Multiple open wounds T07.XXXA       Plan:  ***    I have spent more than *** minutes face to face with Lizette Murray,and reviewing notes and laboratory data with greater than 50% of this time instructing and counseling the patient *** regarding my findings and recommendations and I have answered all questions as posed to me by Ms. Murray. Thank you, Lourdes Murrell MD for allowing me to consult in the care of this patient.    Skyler Joseph, DO , FACP, FACC, [facility]    NOTE:  This report was transcribed using voice recognition software.  Every effort was made to ensure accuracy; however, inadvertent computerized transcription errors may be present.

## 2024-09-28 NOTE — FLOWSHEET NOTE
Pt completed 3.45 hrs of HD on a 2K bath with 1.7L of UF removed safely. Post repot to Trevor KIM   09/28/24 1104   Vital Signs   BP (!) 112/58   Temp 98.2 °F (36.8 °C)   Pulse 55   Respirations 18   Weight - Scale 108.3 kg (238 lb 12.1 oz)   Percent Weight Change -1.55   Pain Assessment   Pain Assessment 0-10   Pain Level 0   Post-Hemodialysis Assessment   Post-Treatment Procedures Blood returned;Access bleeding time < 10 minutes   Machine Disinfection Process Acid/Vinegar Clean;Heat Disinfect;Exterior Machine Disinfection   Rinseback Volume (ml) 300 ml   Blood Volume Processed (Liters) 93.7 L   Dialyzer Clearance Moderately streaked   Duration of Treatment (minutes) 225 minutes   Hemodialysis Intake (ml) 300 ml   Hemodialysis Output (ml) 2000 ml   NET Removed (ml) 1700   Tolerated Treatment Good   Patient Response to Treatment tolerated well; blood returned; needles pulled; stasis achieved; post report to Trevor KIM   Bilateral Breath Sounds Clear   Edema Generalized   Time Off 1100   Patient Disposition Return to room   Observations & Evaluations   Level of Consciousness 0   Oriented X 3   Respiratory Quality/Effort Unlabored   O2 Device Nasal cannula   Skin Color Pale   Skin Condition/Temp Dry;Warm   Abdomen Inspection Soft   Bowel Sounds (All Quadrants) Active

## 2024-09-28 NOTE — DISCHARGE SUMMARY
Discharge Summary  Patient ID:  Lizette Murray  [id number]  69 y.o. 1955 female  Lourdes Murrell MD        Admit date: 9/24/2024    Discharge date and time:  9/28/2024  1:29 PM      Activity level: As tolerated  Diet: Regular diet, carb control, renal  Labs: Per primary  Disposition: Home  Condition on Discharge: Stable      Admit Diagnoses:   Patient Active Problem List   Diagnosis    Nausea & vomiting    Sepsis without acute organ dysfunction (HCC)    Essential hypertension    Hyperlipidemia    Type 2 diabetes mellitus with stage 5 chronic kidney disease not on chronic dialysis, with long-term current use of insulin (HCC)    Neck pain    Anemia    COVID-19    Mild pulmonary hypertension ([facility])    Obesity, Class III, BMI 40-49.9 (morbid obesity)    History of Clostridioides difficile colitis    Anxiety and depression    At high risk for falls    Dizziness on standing    Acute cough    H/O heart artery stent    Diabetes mellitus (HCC)    Hematemesis    ESRD on hemodialysis ([facility])    Leg swelling    Lymphedema of both lower extremities    Rectal bleeding    BRBPR (bright red blood per rectum)    Aortic stenosis, mild    Anemia due to stage 5 chronic kidney disease (HCC)    History of pulmonary embolus (PE)    Hyperkalemia    Pneumonia due to organism    Gallstones    Acute on chronic respiratory failure (HCC)    Dyspnea    Fluid overload    Macrocytosis    Chronic anemia    End stage renal disease (HCC)    Mediastinal lymphadenopathy    Coronary artery disease of native artery of native heart with stable angina pectoris ([facility])    Encounter regarding vascular access for dialysis for ESRD (HCC)    ESRD (end stage renal disease) (HCC)    Wound infection    Diabetic ulcer of left great toe (HCC)    Multiple open wounds    Aortic stenosis, severe       Discharge Diagnoses: Principal Problem:    Wound infection  Active Problems:    ESRD on hemodialysis ([facility])    Hyperkalemia    Fluid overload    Diabetic ulcer of left  102.8* 102.8*   MCH 32.7 31.9 32.1   MCHC 32.1 31.0* 31.3*   RDW 15.6* 15.2* 15.5*   PLT  --   --  132   MPV 11.7 11.2 11.2       No results for input(s): \"GLUMET\" in the last 72 hours.        Imaging:  XR FOOT LEFT (MIN 3 VIEWS)    Result Date: 9/25/2024  EXAMINATION: THREE XRAY VIEWS OF THE LEFT FOOT 9/24/2024 11:57 pm COMPARISON: None. HISTORY: ORDERING SYSTEM PROVIDED HISTORY: eval right toe wound TECHNOLOGIST PROVIDED HISTORY: Reason for exam:->eval right toe wound FINDINGS: Diffusely decreased osseous mineralization.  No acute fracture or dislocation.  Subtle lucency over the dorsal head of 1st metatarsal.  Diffuse vascular calcifications and soft tissue swelling around the ankle and foot.     1.  Osteopenia limits evaluation of fractures and erosions. 2.  Subtle osseous erosion versus subchondral cyst suggested over the dorsal head of the 1st metatarsal. This finding is nonspecific and can be seen in osteoarthritis or crystalline arthropathy.  In the absence of an overlying skin ulceration, osteomyelitis is considered less likely.  Recommend direct visualization for determination of skin integrity.     CTA PULMONARY W CONTRAST    Result Date: 9/25/2024  EXAMINATION: CTA OF THE CHEST 9/24/2024 10:21 pm TECHNIQUE: CTA of the chest was performed after the administration of intravenous contrast.  Multiplanar reformatted images are provided for review.  MIP images are provided for review. Automated exposure control, iterative reconstruction, and/or weight based adjustment of the mA/kV was utilized to reduce the radiation dose to as low as reasonably achievable. COMPARISON: None. HISTORY: ORDERING SYSTEM PROVIDED HISTORY: shortness of breath TECHNOLOGIST PROVIDED HISTORY: Reason for exam:->shortness of breath Additional Contrast?->1 FINDINGS: VESSELS: Thoracic aorta is normal in caliber. No filling defect is identified within the pulmonary arterial system. HEART: Mildly enlarged.  No pericardial effusion.  Coronary

## 2024-09-28 NOTE — PROGRESS NOTES
Pharmacy Consultation Note  (Antibiotic Dosing and Monitoring)    Initial consult date: 9/25/2024   Consulting physician/provider: Maki Harvey APRN - CNP   Drug: Vancomycin  Indication: SSTI    Age/  Gender Height Weight IBW  Allergy Information   69 y.o./female 162.6 cm (5' 4\") 113.4 kg (250 lb)     Ideal body weight: 54.7 kg (120 lb 9.5 oz)  Adjusted ideal body weight: 76.1 kg (167 lb 13.7 oz)   Pcn [penicillins], Morphine, Sodium hypochlorite, and Tessalon [benzonatate]      Renal Function:  Recent Labs     09/26/24  0740 09/27/24  0507 09/28/24  0436   BUN 38* 23 40*   CREATININE 5.4* 4.3* 6.0*       Intake/Output Summary (Last 24 hours) at 9/28/2024 1217  Last data filed at 9/28/2024 1104  Gross per 24 hour   Intake 300 ml   Output 2000 ml   Net -1700 ml       Vancomycin Monitoring:  Trough:  No results for input(s): \"VANCOTROUGH\" in the last 72 hours.  Random:    Recent Labs     09/26/24  0740 09/27/24  0507   VANCORANDOM 30.4 21.3       Recent vancomycin administrations                     vancomycin (VANCOCIN) 750 mg in sodium chloride 0.9 % 250 mL IVPB (mg) 750 mg New Bag 09/25/24 2340    vancomycin (VANCOCIN) 2,500 mg in sodium chloride 0.9 % 500 mL IVPB (mg) 2,500 mg New Bag 09/25/24 0157             Assessment:  Patient is a 69 y.o. female who has been initiated on vancomycin.  Estimated Creatinine Clearance: 11 mL/min (A) (based on SCr of 6 mg/dL (H)).  To dose vancomycin, pharmacy will be utilizing dosing based off of levels due to patient requiring hemodialysis- T/S/S schedule.   Vancomycin level on 9/27 is 21.3 mcg/mL.  Hemodialysis (225 minutes) completed on 9/28 at 1104.    Plan:  Vancomycin 1500 mg IV x 1 now.  Will monitor nephrology notes and dialysis schedule to determine future vancomycin dosing plans.   Pharmacy to follow.      Electronically signed by Manuel Swartz RPH, Pharm.D. 9/28/2024 12:17 PM   Ext: [id number]    JODI: [phone]  SEY: [phone]  SJW: [phone]

## 2024-09-28 NOTE — PROGRESS NOTES
VM left with Dr. Hogue regarding patients wound care at [place]     Electronically signed by Mary Herrera RN on 9/28/2024 at 2:45 PM    Spoke with Dr. Hogue - Xeroform and Dry Dressing Daily until seen in the office - follow up in 1 week     Electronically signed by Mary Herrera RN on 9/28/2024 at 3:08 PM

## 2024-09-28 NOTE — PROGRESS NOTES
Podiatry Progress Note  9/28/2024   Lizette Murray       SUBJECTIVE: This is a 69 y.o. female seen bedside for left great toe wound. Patient states she noticed a blister on her great toe recently and bloody drainage coming from the great toe.  Patient also complains of swelling to bilateral lower extremity.  Patient previously lost her  and gained excessive amount of weight after.  Patient denies n,v,f,c,d at this time. Patient has no other pedal complaints.       OBJECTIVE:      Pt is AAOx3    Vitals:    09/28/24 0652   BP: (!) 113/54   Pulse: 69   Resp: 16   Temp: 97.8 °F (36.6 °C)   SpO2: 97%      EXAM:      Vascular Exam:  DP and PT pulses are faintly palpable. CFT <5 seconds to digits. Skin temp is warm to warm from proximal to distal.     Neuro Exam:  Light touch sensation is intact     Dermatologic Exam:  There is a fluid filled blister noted to dorsolateral left hallux, improved. There is surrounding erythema and edema, improved but present. There is no malodor or purulence noted. There is milld fluctuance noted to the area. Wound does not probe to bone.     MSK: Deferred         Current Facility-Administered Medications   Medication Dose Route Frequency Provider Last Rate Last Admin    sodium chloride flush 0.9 % injection 5-40 mL  5-40 mL IntraVENous 2 times per day Maki Harvey APRN - CNP   10 mL at 09/27/24 2036    sodium chloride flush 0.9 % injection 5-40 mL  5-40 mL IntraVENous PRN Maki Harvey APRN - CNP        0.9 % sodium chloride infusion   IntraVENous PRN Maki Harvey APRN - CNP        heparin (porcine) injection 5,000 Units  5,000 Units SubCUTAneous 3 times per day Maki Harvey APRN - CNP   5,000 Units at 09/28/24 0633    polyethylene glycol (GLYCOLAX) packet 17 g  17 g Oral Daily PRN Maki Harvey APRN - CNP        acetaminophen (TYLENOL) tablet 650 mg  650 mg Oral Q6H PRN Maki Harvey APRN - CNP   650 mg at 09/27/24 2242    Or    acetaminophen

## 2024-09-28 NOTE — PROGRESS NOTES
JENNIFER MEDINA notified of DC - pt received HD today and can be expected Tuesday AM as scheduled    Electronically signed by Mary Herrera RN on 9/28/2024 at 12:30 PM

## 2024-09-28 NOTE — PROGRESS NOTES
[facility]  MD Sidney Amezcua, MD Sedrick Jones, MD Melissa Jones, CNP   Jaja Echavarria, ANP  Priya Hope, CASH  Progress Note    9/28/2024    SUBJECTIVE:   9/26: Sitting up in the chair, no acute distress. No longer having nausea or dry heaves. Denies chest pain or palpitations. Does have mild dyspnea.     9/27: Sitting up in the chair. Feels better. Denies dyspnea. Had some nausea this morning. Vital signs are stable. Tolerated HD yesterday without issues.     PROBLEM LIST:    Principal Problem:    Wound infection  Active Problems:    ESRD on hemodialysis (HCC)    Hyperkalemia    Fluid overload    Diabetic ulcer of left great toe (HCC)    Multiple open wounds    Aortic stenosis, severe  Resolved Problems:    * No resolved hospital problems. *         DIET:    ADULT DIET; Regular; 4 carb choices (60 gm/meal); Low Potassium (Less than 3000 mg/day)     MEDS (scheduled):    levoFLOXacin  500 mg Oral Q48H    metroNIDAZOLE  500 mg Oral 3 times per day    sodium chloride flush  5-40 mL IntraVENous 2 times per day    heparin (porcine)  5,000 Units SubCUTAneous 3 times per day    atorvastatin  40 mg Oral QAM    calcium acetate  2 capsule Oral TID WC    insulin glargine  15 Units SubCUTAneous QAM    insulin lispro  0-4 Units SubCUTAneous TID WC    insulin lispro  0-4 Units SubCUTAneous Nightly       MEDS (infusions):   sodium chloride         MEDS (prn):  sodium chloride flush, sodium chloride, polyethylene glycol, acetaminophen **OR** acetaminophen, midodrine, prochlorperazine **OR** prochlorperazine, diphenhydrAMINE    PHYSICAL EXAM:     Patient Vitals for the past 24 hrs:   BP Temp Temp src Pulse Resp SpO2 Weight   09/28/24 1145 (!) 109/43 98 °F (36.7 °C) Oral 71 16 98 % --   09/28/24 1104 (!) 112/58 98.2 °F (36.8 °C) -- 55 18 -- 108.3 kg (238 lb 12.1 oz)   09/28/24 0652 (!) 113/54 97.8 °F (36.6 °C) Oral 69 16 97 % --   09/27/24 1904 123/78 97.9 °F (36.6 °C) Oral 72 16 100 % --

## 2024-09-28 NOTE — PROGRESS NOTES
PROGRESS NOTE       PATIENT PROBLEM LIST:  Patient Active Problem List   Diagnosis Code    Nausea & vomiting R11.2    Sepsis without acute organ dysfunction ([facility]) A41.9    Essential hypertension I10    Hyperlipidemia E78.5    Type 2 diabetes mellitus with stage 5 chronic kidney disease not on chronic dialysis, with long-term current use of insulin ([facility]) E11.22, N18.5, Z79.4    Neck pain M54.2    Anemia D64.9    COVID-19 U07.1    Mild pulmonary hypertension ([facility]) I27.20    Obesity, Class III, BMI 40-49.9 (morbid obesity) E66.01    History of Clostridioides difficile colitis Z86.19    Anxiety and depression F41.9, F32.A    At high risk for falls Z91.81    Dizziness on standing R42    Acute cough R05.1    H/O heart artery stent Z95.5    Diabetes mellitus (HCC) E11.9    Hematemesis K92.0    ESRD on hemodialysis ([facility]) N18.6, Z99.2    Leg swelling M79.89    Lymphedema of both lower extremities I89.0    Rectal bleeding K62.5    BRBPR (bright red blood per rectum) K62.5    Aortic stenosis, mild I35.0    Anemia due to stage 5 chronic kidney disease (HCC) N18.5, D63.1    History of pulmonary embolus (PE) Z86.711    Hyperkalemia E87.5    Pneumonia due to organism J18.9    Gallstones K80.20    Acute on chronic respiratory failure ([facility]) J96.20    Dyspnea R06.00    Fluid overload E87.70    Macrocytosis D75.89    Chronic anemia D64.9    End stage renal disease ([facility]) N18.6    Mediastinal lymphadenopathy R59.0    Coronary artery disease of native artery of native heart with stable angina pectoris ([facility]) I25.118    Encounter regarding vascular access for dialysis for ESRD ([facility]) N18.6, Z99.2    ESRD (end stage renal disease) ([facility]) N18.6    Wound infection T14.8XXA, L08.9    Diabetic ulcer of left great toe ([facility]) E11.621, L97.529    Multiple open wounds T07.XXXA       SUBJECTIVE:  Lizette Murray states she feels somewhat better and denies shortness of breath nor chest discomfort or palpitations presently.    REVIEW OF SYSTEMS:  General

## 2024-10-01 ENCOUNTER — TELEPHONE (OUTPATIENT)
Dept: PRIMARY CARE CLINIC | Age: 69
End: 2024-10-01

## 2024-10-01 NOTE — TELEPHONE ENCOUNTER
Care Transitions Initial Follow Up Call    Outreach made within 2 business days of discharge: Yes    Patient: Lizette Murray Patient : 1955   MRN: [id number]  Reason for Admission:   Discharge Date: 24       Spoke with: LVM for pt to rtc    Discharge department/facility: [facility]    TCM Interactive Patient Contact:      Scheduled appointment with PCP within 7-14 days    Follow Up  Future Appointments   Date Time Provider Department Center   10/29/2024  3:45 PM Jim Acosta MD [facility]   2024 11:30 AM Lourdes Murrell MD [facility] DEP   2/3/2025  9:45 AM Eber Henry MD [facility]   4/15/2025  1:00 PM Lourdes Murrell MD [facility] DEP       Emilee Goodson MA

## 2024-10-02 ENCOUNTER — TELEPHONE (OUTPATIENT)
Dept: PRIMARY CARE CLINIC | Age: 69
End: 2024-10-02

## 2024-10-02 NOTE — H&P
[facility]                                     Progress Note    Patient Name: Kiesha Mcguire  Date: 10/2/2024       Service Type: Individual      Session Start Time:  10:00 AM Session End Time: 10:51 AM     Session Length: 38-52 minutes    Session #: 66 with this provider    Attendees: Client attended alone    Service Modality:  Video Visit:      Provider verified identity through the following two step process.  Patient provided:  Patient is known previously to provider    Telemedicine Visit: The patient's condition can be safely assessed and treated via synchronous audio and visual telemedicine encounter.      Reason for Telemedicine Visit: Patient convenience (e.g. access to timely appointments / distance to available provider) and Services only offered telehealth    Originating Site (Patient Location): Patient's home    Distant Site (Provider Location): [facility]: Mireille [place] /On-site    Consent:  The patient/guardian has verbally consented to: the potential risks and benefits of telemedicine (video visit) versus in person care; bill my insurance or make self-payment for services provided; and responsibility for payment of non-covered services.     Patient would like the video invitation sent by:  [employer]    Mode of Communication:  Video    As the provider I attest to compliance with applicable laws and regulations related to telemedicine.    DATA  Interactive Complexity: No   Crisis: No      Progress Since Last Session (Related to Symptoms / Goals / Homework):  Symptoms:  no change since previous appointment    Homework: Achieved / completed to satisfaction     Episode of Care Goals: Satisfactory progress - ACTION (Actively working towards change); Intervened by reinforcing change plan / affirming steps taken    There has been demonstrated improvement in functioning while patient has been engaged in psychotherapy/psychological service- if withdrawn the patient would  "deteriorate and/or relapse.       Current / Ongoing Stressors and Concerns:   Difficulty trusting others and being vulnerable  Limited close relationships  Stressors related to parenting   Work related stressors  Dynamics in relationship with ex-  Fear of Failure     Treatment Objective(s) Addressed in This Session:   Identify negative self-talk and behaviors: challenge core beliefs, myths, and actions  Maintain behavior changes    Use 1 strategy to increase engagement in completion of tasks    Safe/calm state titled \"calm\"  Container: box with a lock     Intervention:   Engaged in active listening   CBT: engaged in perspective taking    Assessments completed prior to visit:  The following assessments were completed by patient for this visit:  None    ASSESSMENT: Current Emotional / Mental Status (status of significant symptoms):   Risk status (Self / Other harm or suicidal ideation)   Patient denies current fears or concerns for personal safety.   Patient denies current or recent suicidal ideation or behaviors.   Patient denies current or recent homicidal ideation or behaviors.   Patient denies current or recent self injurious behavior or ideation.   Patient denies other safety concerns.   Patient reports there has been no change in risk factors since their last session.     Patient reports there has been no change in protective factors since their last session.     Recommended that patient call 911 or go to the local ED should there be a change in any of these risk factors.     Appearance:   Appropriate    Eye Contact:   Good    Psychomotor Behavior: Normal Restless hands   Attitude:   Cooperative  Interested Friendly Pleasant    Orientation:   All   Speech    Rate / Production: Talkative Normal     Volume:  Normal    Mood:    Anxious Stable   Affect:    Mood Congruent     Thought Content:  Clear    Thought Form:  Coherent  Goal Directed  Logical    Insight:    Good      Medication Review:   No changes to " current psychiatric medication(s)   Vyvanse   Buspar-client is not taking daily    Estrogen patch     Medication Compliance:   Yes     Changes in Health Issues:   None reported     Chemical Use Review:   Substance Use: no use     Tobacco Use: no use    Diagnosis:  Generalized Anxiety Disorder   Other Specified Trauma and Stress Related Disorder   Unspecified Depressive Disorder     Collateral Reports Completed:   Not Applicable    PLAN: (Patient Tasks / Therapist Tasks / Other)  EMDR:  Plan for next session to have client keep her eyes open.   Target: concerns about hoarding  Therapist to continue resetting affective circuits, engage client in building resources and the early trauma protocol.  Client will maintain behavior changes; including focusing on areas within her control    Magali Montilla, [facility] 10/2/2024    ______________________________________________________________________    Individual Treatment Plan    Patient's Name: Kiesha Mcguire  YOB: 1968    Date of Creation: 5/11/2022  Date Treatment Plan Last Reviewed/Revised: 9/20/2024    DSM5 Diagnoses:    Generalized Anxiety Disorder  Other Specified Trauma and Stress Related Disorder  Unspecified Depressive Disorder     Psychosocial / Contextual Factors: stressors related to parenting  PROMIS (reviewed every 90 days):    PROMIS 10-Global Health (only subscores and total score):       1/23/2024    12:55 PM 2/8/2024    10:13 AM 5/23/2024    10:26 AM 6/27/2024    10:08 AM 7/10/2024    12:04 PM 8/8/2024    12:27 PM 8/21/2024    10:36 AM   PROMIS-10 Scores Only   Global Mental Health Score 14 12 13 14 13 13 14   Global Physical Health Score 17 16 15 15 15 14 15   PROMIS TOTAL - SUBSCORES 31 28 28 29 28 27 29        Referral / Collaboration:  Referral to another professional/service is not indicated at this time..    Anticipated number of session for this episode of care: will review in 90 days  Anticipation frequency of session: Every other  "week  Anticipated Duration of each session: 38-52 minutes  Treatment plan will be reviewed in 90 days or when goals have been changed.       MeasurableTreatment Goal(s) related to diagnosis / functional impairment(s)  Goal 1: Patient will sustain attention and concentration for consistently longer periods of time.  Patient will meet goals set for completing tasks 80% of the time.   Client's Goal:  I will know I've met my goal when my space isn't so chaotic, meeting deadlines around bills and work, when I am not always playing catch-up, completing tasks.      Objective #A (Patient Action)    Patient will learn and implement organization and planning skills.  Status: New - Date: 5/11/2022 , continued date: 9/8/2022, continued date: 12/21/2022, completed date: 3/29/2023    Intervention(s)  Therapist will teach the client organization and planning skills.  Therapist will address any potential barriers to using skills.    Objective #B  Patient will  identify, challenge, and change self-talk that contributes to maladaptive feelings and actions .  Status: New - Date: 5/11/2022 , Continued date: 9/8/2022, continued date: 12/21/2022, continued date: 3/29/2023, continued date: 7/28/2023, continued date: 11/9/2023, continued date: 1/23/2024, continued date: 5/9/2024, continued date: 9/20/2024    Intervention(s)  Therapist will teach the CBT model, cognitive distortions, and cognitive restructuring techniques.      Goal 2: Patient will be able to recall the traumatic events without becoming overwhelmed with negative thoughts, feelings, or urges.   Client's Goal:  I will know I've met my goal when I do not cry every time I talk about it.\"    Objective #A (Patient Action)    Status: New - Date: 5/11/2022, continued date: 9/8/2022, continued date: 12/21/2022, continued date: 3/29/2023, continued date: 7/28/2023, continued date: 11/9/2023, continued date: 1/23/2024, continued date: 5/9/2024, continued date: 9/20/2024    Patient " will describe the history of and nature of trauma symptoms    Intervention(s)  Therapist will assess the client's frequency, intensity, duration, and history of trauma symptoms and their impact on functioning.    Objective #B (Patient Action)  Status: New Date: 5/11/2022, continued date: 9/8/2022, continued date: 12/21/2022, continued date: 3/29/2023, continued date: 7/28/2023, continued date: 11/9/2023, continued date: 1/23/2024, continued date: 5/9/2024, continued date: 9/20/2024    Patient will cooperate with eye movement desensitization and reprocessing (EMDR) to reduce emotional reaction to traumatic event(s)    Intervention(s)  Therapist will utilize EMDR to reduce the client's emotional reactivity to the traumatic event(s).    Objective #C (Patient Action)  Status: New Date: 5/11/2022, continued date: 9/8/2022, continued date: 12/21/2022, continued date: 3/29/2023, continued date: 7/28/2023, continued date: 11/9/2023, continued date: 1/23/2024, continued date: 5/9/2024, continued date: 9/20/2024    Patient will learn and implement calming and coping strategies to manage trauma symptoms.    Intervention(s)  Therapist will teach grounding techniques, distress tolerance, and emotion regulation techniques.    Goal 3: Patient's anxiety symptoms will remit as evidenced by a decrease in GAD7 scores, where symptoms occur fewer than half the days for a minimum of four weeks.    Objective #A (Patient Action)    Patient will identify 3 symptoms of anxiety.  Status: New - Date: 9/20/2024    Intervention(s)  Therapist will provide psychoeducation on anxiety and engage client in identifying symptoms in appointments.    Objective #B  Patient will use a minimum of 3 strategies to manage anxiety symptoms  Status: New - Date: 9/20/2024    Intervention(s)  Therapist will teach strategies such as grounding techniques, thought stopping, and use of distraction    Objective #C  Patient will use cognitive strategies to manage  thoughts/fears that contribute to anxiety symptoms.  Status: New - Date:     Intervention(s)  Therapist will teach the CBT (Cognitive Behavioral Therapy) model, including cognitive distortions and cognitive restructuring techniques.       Patient has reviewed and agreed to the above plan.      Magali Montilla, [facility]  May 11, 2022  Magali Montilla, [facility]  9/8/2022  Magali Montilla, [facility]  12/21/2022  Magali Montilla, [facility]  3/29/2023  Magali Montilla, [facility]  7/28/2023  [facility]  11/9/2023  Magali Montilla, [facility]  1/23/2024  [facility], [facility]  5/9/2024  Magali Montilla, [facility]  9/20/2024   11/18/2021 03:19 AM    BILIRUBINUR Negative 11/18/2021 03:19 AM    BLOODU SMALL 11/18/2021 03:19 AM    GLUCOSEU 500 11/18/2021 03:19 AM     ABG:  No results found for: PH, PCO2, PO2, HCO3, BE, THGB, TCO2, O2SAT  HgBA1c:    Lab Results   Component Value Date/Time    LABA1C 10.3 05/08/2023 08:11 PM     FLP:    Lab Results   Component Value Date/Time    TRIG 170 05/09/2023 02:45 AM    HDL 52 05/09/2023 02:45 AM    LDLCALC 72 05/09/2023 02:45 AM    LABVLDL 34 05/09/2023 02:45 AM     TSH:    Lab Results   Component Value Date/Time    TSH 2.700 05/08/2023 12:16 PM     VITAMIN B12: No components found for: B12  FOLATE:    Lab Results   Component Value Date/Time    FOLATE 8.8 05/08/2023 08:11 PM     IRON:    Lab Results   Component Value Date/Time    IRON 45 01/31/2023 06:35 AM     Iron Saturation:  No components found for: PERCENTFE  TIBC:    Lab Results   Component Value Date/Time    TIBC 173 01/31/2023 06:35 AM     FERRITIN:    Lab Results   Component Value Date/Time    FERRITIN 2,598 05/09/2023 02:45 AM       RADIOLOGY:  XR CHEST (2 VW)   Final Result   Mild cardiomegaly with mild CHF. Mild right basilar subsegmental atelectasis. ASSESSMENT:      Active Hospital Problems    Diagnosis     Hyperkalemia [E87.5]        PLAN:  Medications discussed with patient  GI prophylaxis  DVT prophylaxis  Nephrology was consulted for hemodialysis  Cardiology consult--I reviewed consult, no further work-up  Med reconciliation completed  Prescriptions written  Follow-up Dr. Harley Mcghee 1 week  Follow-up with dialysis per her regular schedule  Apixaban 5 mg by mouth twice daily  Amlodipine 2.5 mg daily  Atorvastatin 10 mg daily  PhosLo 667, 1 capsule 3 times daily  Humalog 10 units 3 times daily with meals  Midodrine 10 mg daily as needed for low blood pressure during dialysis  Protonix 40 mg twice daily  Carafate 1 g 4 times daily  Resume prehospital insulins        Please note that over 50 minutes was spent .   Greater

## 2024-10-02 NOTE — TELEPHONE ENCOUNTER
Care Transitions Initial Follow Up Call    Outreach made within 2 business days of discharge: Yes    Patient: Lizette Murray Patient : 1955   MRN: [id number]  Reason for Admission: Wound infection   Discharge Date: 24       Spoke with: Patient    Discharge department/facility: [facility] Patient Contact:  Was patient able to fill all prescriptions: Yes  Was patient instructed to bring all medications to the follow-up visit: Yes  Is patient taking all medications as directed in the discharge summary? Yes  Does patient understand their discharge instructions: Yes  Does patient have questions or concerns that need addressed prior to 7-14 day follow up office visit: no    Additional needs identified to be addressed with provider  No needs identified             Scheduled appointment with PCP within 7-14 days    Follow Up  Future Appointments   Date Time Provider Department Center   10/9/2024 11:00 AM Lourdes Murrell MD [facility] DEP   10/29/2024  3:45 PM Jim Acosta MD [facility]   2024 11:30 AM Lourdes Murrell MD [facility] DEP   2/3/2025  9:45 AM Eber Henry MD [facility]   4/15/2025  1:00 PM [facility]       Emilee Goodson MA

## 2024-10-07 NOTE — PROGRESS NOTES
Physician Progress Note      PATIENT:               RADHA ARANDA  CSN #:                  [id number]  :                       1955  ADMIT DATE:       2024 5:04 PM  DISCH DATE:        2024 4:03 PM  RESPONDING  PROVIDER #:        Jasper Juarez MD          QUERY TEXT:    Pt admitted with left foot cellulitis. Pt noted to have DM type 2. If   possible, please document in progress notes and discharge summary the   relationship, if any, between cellulitis and DM.    The medical record reflects the following:  Risk Factors: ESRD  Clinical Indicators: per IM \"...Diabetic foot infection of the left hallux   rule out osteomyelitis...\", per podiatry \"...Cellulitis left foot...\"  Treatment: podiatry consult, IV Maxipime    Thank you,  Courtney Jane RN, BSN, CDIS  Clinical Documentation Integrity  [email or web address]  Options provided:  -- Left foot cellulitis associated with Diabetes  -- Left foot cellulitis unrelated to Diabetes  -- Other - I will add my own diagnosis  -- Disagree - Not applicable / Not valid  -- Disagree - Clinically unable to determine / Unknown  -- Refer to Clinical Documentation Reviewer    PROVIDER RESPONSE TEXT:    Left foot cellulitis associated with Diabetes.    Query created by: Courtney Jane on 10/4/2024 7:17 AM      Electronically signed by:  Jasper Juarez MD 10/7/2024 3:03 PM

## 2024-10-09 ENCOUNTER — OFFICE VISIT (OUTPATIENT)
Dept: PRIMARY CARE CLINIC | Age: 69
End: 2024-10-09

## 2024-10-09 VITALS
SYSTOLIC BLOOD PRESSURE: 100 MMHG | HEIGHT: 66 IN | DIASTOLIC BLOOD PRESSURE: 70 MMHG | WEIGHT: 256.84 LBS | TEMPERATURE: 97 F | RESPIRATION RATE: 22 BRPM | HEART RATE: 64 BPM | BODY MASS INDEX: 41.28 KG/M2 | OXYGEN SATURATION: 96 %

## 2024-10-09 DIAGNOSIS — Z09 HOSPITAL DISCHARGE FOLLOW-UP: Primary | ICD-10-CM

## 2024-10-09 DIAGNOSIS — E11.22 TYPE 2 DIABETES MELLITUS WITH CHRONIC KIDNEY DISEASE ON CHRONIC DIALYSIS, WITH LONG-TERM CURRENT USE OF INSULIN (HCC): ICD-10-CM

## 2024-10-09 DIAGNOSIS — I35.0 NONRHEUMATIC AORTIC VALVE STENOSIS: ICD-10-CM

## 2024-10-09 DIAGNOSIS — L03.119 CELLULITIS AND ABSCESS OF LEG: ICD-10-CM

## 2024-10-09 DIAGNOSIS — E11.65 UNCONTROLLED TYPE 2 DIABETES MELLITUS WITH HYPERGLYCEMIA (HCC): ICD-10-CM

## 2024-10-09 DIAGNOSIS — L02.419 CELLULITIS AND ABSCESS OF LEG: ICD-10-CM

## 2024-10-09 DIAGNOSIS — Z79.4 TYPE 2 DIABETES MELLITUS WITH CHRONIC KIDNEY DISEASE ON CHRONIC DIALYSIS, WITH LONG-TERM CURRENT USE OF INSULIN (HCC): ICD-10-CM

## 2024-10-09 DIAGNOSIS — N18.6 TYPE 2 DIABETES MELLITUS WITH CHRONIC KIDNEY DISEASE ON CHRONIC DIALYSIS, WITH LONG-TERM CURRENT USE OF INSULIN (HCC): ICD-10-CM

## 2024-10-09 DIAGNOSIS — Z99.2 TYPE 2 DIABETES MELLITUS WITH CHRONIC KIDNEY DISEASE ON CHRONIC DIALYSIS, WITH LONG-TERM CURRENT USE OF INSULIN (HCC): ICD-10-CM

## 2024-10-09 DIAGNOSIS — T14.8XXA WOUND INFECTION: ICD-10-CM

## 2024-10-09 DIAGNOSIS — E78.2 MIXED HYPERLIPIDEMIA: ICD-10-CM

## 2024-10-09 DIAGNOSIS — L08.9 WOUND INFECTION: ICD-10-CM

## 2024-10-09 RX ORDER — LEVOFLOXACIN 500 MG/1
500 TABLET, FILM COATED ORAL
Qty: 6 TABLET | Refills: 0 | Status: SHIPPED | OUTPATIENT
Start: 2024-10-09 | End: 2024-10-23

## 2024-10-09 RX ORDER — INSULIN GLARGINE 100 [IU]/ML
12 INJECTION, SOLUTION SUBCUTANEOUS EVERY MORNING
Qty: 5 ADJUSTABLE DOSE PRE-FILLED PEN SYRINGE | Refills: 3 | Status: SHIPPED | OUTPATIENT
Start: 2024-10-09

## 2024-10-09 NOTE — PROGRESS NOTES
Post-Discharge Transitional Care  Follow Up      Lizette Murray   YOB: 1955    Date of Office Visit:  10/9/2024  Date of Hospital Admission: 9/24/24  Date of Hospital Discharge: 9/28/24  Risk of hospital readmission (high >=14%. Medium >=10%) :Readmission Risk Score: 23.1      Care management risk score Rising risk (score 2-5) and Complex Care (Scores >=6): No Risk Score On File     Non face to face  following discharge, date last encounter closed (first attempt may have been earlier): 10/02/2024    Call initiated 2 business days of discharge: Yes    ASSESSMENT/PLAN:   Hospital discharge follow-up  -     MD DISCHARGE MEDS RECONCILED W/ CURRENT OUTPATIENT MED LIST  Type 2 diabetes mellitus with chronic kidney disease on chronic dialysis, with long-term current use of insulin (HCC)  Comments:  Pt. BS high due to infection.  Increase Basaglar  Orders:  -     insulin glargine (BASAGLAR KWIKPEN) 100 UNIT/ML injection pen; Inject 12 Units into the skin every morning, Disp-5 Adjustable Dose Pre-filled Pen Syringe, R-3Normal  Wound infection  Comments:  Patient did not fill the levothyroxine/not available continue metronidazole prescription for Levaquin/follow-up next week with wound clinic  Orders:  -     levoFLOXacin (LEVAQUIN) 500 MG tablet; Take 1 tablet by mouth three times a week for 14 days Patient to take after dialysis., Disp-6 tablet, R-0Print  Nonrheumatic aortic valve stenosis  Comments:  Severe aortic stenosis patient will follow closely with Dr. Joseph and will require referral for thoracic surgery after infection is healed  Cellulitis and abscess of leg  Comments:  Patient regular with taking the metronidazole and we have added the levaquine  Mixed hyperlipidemia  Uncontrolled type 2 diabetes mellitus with hyperglycemia (HCC)  Comments:  Blood sugars elevated due to infection patient to increase Basaglar to 16 and to take 12 of Humalog with each meal in addition to sliding scale bring

## 2024-10-21 ENCOUNTER — TELEPHONE (OUTPATIENT)
Dept: PRIMARY CARE CLINIC | Age: 69
End: 2024-10-21

## 2024-10-21 RX ORDER — AZITHROMYCIN 250 MG/1
TABLET, FILM COATED ORAL
Qty: 6 TABLET | Refills: 0 | Status: SHIPPED
Start: 2024-10-21 | End: 2024-10-23 | Stop reason: ALTCHOICE

## 2024-10-21 NOTE — TELEPHONE ENCOUNTER
Patient was in for a hospital follow up last week and had some post nasal drip which has gotten worse now and is now coughing up green mucus. She would like to know what she can take for this.         [id number] - [place], [place] - [address] - P [phone] - F [phone]

## 2024-10-22 ENCOUNTER — HOSPITAL ENCOUNTER (INPATIENT)
Age: 69
LOS: 3 days | Discharge: ANOTHER HOSPITAL | DRG: 202 | End: 2024-10-28
Attending: STUDENT IN AN ORGANIZED HEALTH CARE EDUCATION/TRAINING PROGRAM | Admitting: INTERNAL MEDICINE
Payer: MEDICARE

## 2024-10-22 DIAGNOSIS — E11.22 TYPE 2 DIABETES MELLITUS WITH CHRONIC KIDNEY DISEASE ON CHRONIC DIALYSIS, WITH LONG-TERM CURRENT USE OF INSULIN (HCC): ICD-10-CM

## 2024-10-22 DIAGNOSIS — I49.8 BIGEMINY: Primary | ICD-10-CM

## 2024-10-22 DIAGNOSIS — B34.8 RHINOVIRUS: ICD-10-CM

## 2024-10-22 DIAGNOSIS — Z99.2 ESRD ON DIALYSIS (HCC): ICD-10-CM

## 2024-10-22 DIAGNOSIS — Z99.2 TYPE 2 DIABETES MELLITUS WITH CHRONIC KIDNEY DISEASE ON CHRONIC DIALYSIS, WITH LONG-TERM CURRENT USE OF INSULIN (HCC): ICD-10-CM

## 2024-10-22 DIAGNOSIS — N18.6 TYPE 2 DIABETES MELLITUS WITH CHRONIC KIDNEY DISEASE ON CHRONIC DIALYSIS, WITH LONG-TERM CURRENT USE OF INSULIN (HCC): ICD-10-CM

## 2024-10-22 DIAGNOSIS — N18.6 ESRD ON DIALYSIS (HCC): ICD-10-CM

## 2024-10-22 DIAGNOSIS — Z79.4 TYPE 2 DIABETES MELLITUS WITH CHRONIC KIDNEY DISEASE ON CHRONIC DIALYSIS, WITH LONG-TERM CURRENT USE OF INSULIN (HCC): ICD-10-CM

## 2024-10-22 PROCEDURE — 99285 EMERGENCY DEPT VISIT HI MDM: CPT

## 2024-10-22 PROCEDURE — 93005 ELECTROCARDIOGRAM TRACING: CPT | Performed by: PHYSICIAN ASSISTANT

## 2024-10-22 RX ORDER — ONDANSETRON 2 MG/ML
4 INJECTION INTRAMUSCULAR; INTRAVENOUS ONCE
Status: COMPLETED | OUTPATIENT
Start: 2024-10-23 | End: 2024-10-23

## 2024-10-22 RX ORDER — 0.9 % SODIUM CHLORIDE 0.9 %
500 INTRAVENOUS SOLUTION INTRAVENOUS ONCE
Status: COMPLETED | OUTPATIENT
Start: 2024-10-23 | End: 2024-10-23

## 2024-10-22 ASSESSMENT — PAIN SCALES - GENERAL: PAINLEVEL_OUTOF10: 10

## 2024-10-22 ASSESSMENT — PAIN DESCRIPTION - LOCATION: LOCATION: NECK

## 2024-10-22 ASSESSMENT — PAIN - FUNCTIONAL ASSESSMENT: PAIN_FUNCTIONAL_ASSESSMENT: 0-10

## 2024-10-23 ENCOUNTER — APPOINTMENT (OUTPATIENT)
Dept: GENERAL RADIOLOGY | Age: 69
DRG: 202 | End: 2024-10-23
Payer: MEDICARE

## 2024-10-23 PROBLEM — J20.8 ACUTE BRONCHITIS DUE TO OTHER SPECIFIED ORGANISMS: Status: ACTIVE | Noted: 2024-10-23

## 2024-10-23 PROBLEM — J20.9 ACUTE BRONCHITIS: Status: ACTIVE | Noted: 2024-10-23

## 2024-10-23 LAB
ALBUMIN SERPL-MCNC: 3.7 G/DL (ref 3.5–5.2)
ALP SERPL-CCNC: 124 U/L (ref 35–104)
ALT SERPL-CCNC: 28 U/L (ref 0–32)
ANION GAP SERPL CALCULATED.3IONS-SCNC: 20 MMOL/L (ref 7–16)
AST SERPL-CCNC: 40 U/L (ref 0–31)
B PARAP IS1001 DNA NPH QL NAA+NON-PROBE: NOT DETECTED
B PERT DNA SPEC QL NAA+PROBE: NOT DETECTED
BASOPHILS # BLD: 0.04 K/UL (ref 0–0.2)
BASOPHILS NFR BLD: 1 % (ref 0–2)
BILIRUB SERPL-MCNC: 0.4 MG/DL (ref 0–1.2)
BUN SERPL-MCNC: 71 MG/DL (ref 6–23)
C PNEUM DNA NPH QL NAA+NON-PROBE: NOT DETECTED
CALCIUM SERPL-MCNC: 8.8 MG/DL (ref 8.6–10.2)
CHLORIDE SERPL-SCNC: 87 MMOL/L (ref 98–107)
CO2 SERPL-SCNC: 25 MMOL/L (ref 22–29)
CREAT SERPL-MCNC: 8.9 MG/DL (ref 0.5–1)
EOSINOPHIL # BLD: 0.25 K/UL (ref 0.05–0.5)
EOSINOPHILS RELATIVE PERCENT: 3 % (ref 0–6)
ERYTHROCYTE [DISTWIDTH] IN BLOOD BY AUTOMATED COUNT: 15.3 % (ref 11.5–15)
FLUAV RNA NPH QL NAA+NON-PROBE: NOT DETECTED
FLUBV RNA NPH QL NAA+NON-PROBE: NOT DETECTED
GFR, ESTIMATED: 4 ML/MIN/1.73M2
GLUCOSE BLD-MCNC: 115 MG/DL (ref 74–99)
GLUCOSE BLD-MCNC: 260 MG/DL (ref 74–99)
GLUCOSE SERPL-MCNC: 144 MG/DL (ref 74–99)
HADV DNA NPH QL NAA+NON-PROBE: NOT DETECTED
HCOV 229E RNA NPH QL NAA+NON-PROBE: NOT DETECTED
HCOV HKU1 RNA NPH QL NAA+NON-PROBE: NOT DETECTED
HCOV NL63 RNA NPH QL NAA+NON-PROBE: NOT DETECTED
HCOV OC43 RNA NPH QL NAA+NON-PROBE: NOT DETECTED
HCT VFR BLD AUTO: 36.3 % (ref 34–48)
HGB BLD-MCNC: 11.5 G/DL (ref 11.5–15.5)
HMPV RNA NPH QL NAA+NON-PROBE: NOT DETECTED
HPIV1 RNA NPH QL NAA+NON-PROBE: NOT DETECTED
HPIV2 RNA NPH QL NAA+NON-PROBE: NOT DETECTED
HPIV3 RNA NPH QL NAA+NON-PROBE: NOT DETECTED
HPIV4 RNA NPH QL NAA+NON-PROBE: NOT DETECTED
IMM GRANULOCYTES # BLD AUTO: 0.03 K/UL (ref 0–0.58)
IMM GRANULOCYTES NFR BLD: 0 % (ref 0–5)
LACTATE BLDV-SCNC: 1.2 MMOL/L (ref 0.5–1.9)
LACTATE BLDV-SCNC: 2.2 MMOL/L (ref 0.5–1.9)
LIPASE SERPL-CCNC: 70 U/L (ref 13–60)
LYMPHOCYTES NFR BLD: 0.81 K/UL (ref 1.5–4)
LYMPHOCYTES RELATIVE PERCENT: 10 % (ref 20–42)
M PNEUMO DNA NPH QL NAA+NON-PROBE: NOT DETECTED
MAGNESIUM SERPL-MCNC: 2.7 MG/DL (ref 1.6–2.6)
MCH RBC QN AUTO: 31.6 PG (ref 26–35)
MCHC RBC AUTO-ENTMCNC: 31.7 G/DL (ref 32–34.5)
MCV RBC AUTO: 99.7 FL (ref 80–99.9)
MONOCYTES NFR BLD: 0.92 K/UL (ref 0.1–0.95)
MONOCYTES NFR BLD: 11 % (ref 2–12)
NEUTROPHILS NFR BLD: 76 % (ref 43–80)
NEUTS SEG NFR BLD: 6.37 K/UL (ref 1.8–7.3)
PLATELET, FLUORESCENCE: 144 K/UL (ref 130–450)
PMV BLD AUTO: 11.4 FL (ref 7–12)
POTASSIUM SERPL-SCNC: 4.9 MMOL/L (ref 3.5–5)
PROCALCITONIN SERPL-MCNC: 1.47 NG/ML (ref 0–0.08)
PROT SERPL-MCNC: 6.7 G/DL (ref 6.4–8.3)
RBC # BLD AUTO: 3.64 M/UL (ref 3.5–5.5)
RSV RNA NPH QL NAA+NON-PROBE: NOT DETECTED
RV+EV RNA NPH QL NAA+NON-PROBE: DETECTED
SARS-COV-2 RDRP RESP QL NAA+PROBE: NOT DETECTED
SARS-COV-2 RNA NPH QL NAA+NON-PROBE: NOT DETECTED
SODIUM SERPL-SCNC: 132 MMOL/L (ref 132–146)
SPECIMEN DESCRIPTION: ABNORMAL
SPECIMEN DESCRIPTION: NORMAL
TROPONIN I SERPL HS-MCNC: 72 NG/L (ref 0–9)
TROPONIN I SERPL HS-MCNC: 72 NG/L (ref 0–9)
WBC OTHER # BLD: 8.4 K/UL (ref 4.5–11.5)

## 2024-10-23 PROCEDURE — 6360000002 HC RX W HCPCS: Performed by: INTERNAL MEDICINE

## 2024-10-23 PROCEDURE — 0202U NFCT DS 22 TRGT SARS-COV-2: CPT

## 2024-10-23 PROCEDURE — G0378 HOSPITAL OBSERVATION PER HR: HCPCS

## 2024-10-23 PROCEDURE — 2700000000 HC OXYGEN THERAPY PER DAY

## 2024-10-23 PROCEDURE — 80053 COMPREHEN METABOLIC PANEL: CPT

## 2024-10-23 PROCEDURE — 6370000000 HC RX 637 (ALT 250 FOR IP): Performed by: INTERNAL MEDICINE

## 2024-10-23 PROCEDURE — 2500000003 HC RX 250 WO HCPCS: Performed by: INTERNAL MEDICINE

## 2024-10-23 PROCEDURE — 85025 COMPLETE CBC W/AUTO DIFF WBC: CPT

## 2024-10-23 PROCEDURE — 2580000003 HC RX 258: Performed by: STUDENT IN AN ORGANIZED HEALTH CARE EDUCATION/TRAINING PROGRAM

## 2024-10-23 PROCEDURE — 84484 ASSAY OF TROPONIN QUANT: CPT

## 2024-10-23 PROCEDURE — 96374 THER/PROPH/DIAG INJ IV PUSH: CPT

## 2024-10-23 PROCEDURE — 96375 TX/PRO/DX INJ NEW DRUG ADDON: CPT

## 2024-10-23 PROCEDURE — 96372 THER/PROPH/DIAG INJ SC/IM: CPT

## 2024-10-23 PROCEDURE — 83690 ASSAY OF LIPASE: CPT

## 2024-10-23 PROCEDURE — 87040 BLOOD CULTURE FOR BACTERIA: CPT

## 2024-10-23 PROCEDURE — 5A1D70Z PERFORMANCE OF URINARY FILTRATION, INTERMITTENT, LESS THAN 6 HOURS PER DAY: ICD-10-PCS | Performed by: INTERNAL MEDICINE

## 2024-10-23 PROCEDURE — 96361 HYDRATE IV INFUSION ADD-ON: CPT

## 2024-10-23 PROCEDURE — 83735 ASSAY OF MAGNESIUM: CPT

## 2024-10-23 PROCEDURE — 71045 X-RAY EXAM CHEST 1 VIEW: CPT

## 2024-10-23 PROCEDURE — 87635 SARS-COV-2 COVID-19 AMP PRB: CPT

## 2024-10-23 PROCEDURE — 94640 AIRWAY INHALATION TREATMENT: CPT

## 2024-10-23 PROCEDURE — 83605 ASSAY OF LACTIC ACID: CPT

## 2024-10-23 PROCEDURE — 2580000003 HC RX 258: Performed by: INTERNAL MEDICINE

## 2024-10-23 PROCEDURE — 94664 DEMO&/EVAL PT USE INHALER: CPT

## 2024-10-23 PROCEDURE — 90935 HEMODIALYSIS ONE EVALUATION: CPT

## 2024-10-23 PROCEDURE — 96376 TX/PRO/DX INJ SAME DRUG ADON: CPT

## 2024-10-23 PROCEDURE — 6360000002 HC RX W HCPCS: Performed by: STUDENT IN AN ORGANIZED HEALTH CARE EDUCATION/TRAINING PROGRAM

## 2024-10-23 PROCEDURE — 82962 GLUCOSE BLOOD TEST: CPT

## 2024-10-23 PROCEDURE — 84145 PROCALCITONIN (PCT): CPT

## 2024-10-23 PROCEDURE — 99222 1ST HOSP IP/OBS MODERATE 55: CPT | Performed by: INTERNAL MEDICINE

## 2024-10-23 RX ORDER — ACETAMINOPHEN 650 MG/1
650 SUPPOSITORY RECTAL EVERY 6 HOURS PRN
Status: DISCONTINUED | OUTPATIENT
Start: 2024-10-23 | End: 2024-10-28 | Stop reason: HOSPADM

## 2024-10-23 RX ORDER — ASPIRIN 81 MG/1
81 TABLET, CHEWABLE ORAL EVERY MORNING
Status: DISCONTINUED | OUTPATIENT
Start: 2024-10-23 | End: 2024-10-28 | Stop reason: HOSPADM

## 2024-10-23 RX ORDER — METHYLPREDNISOLONE SODIUM SUCCINATE 40 MG/ML
40 INJECTION, POWDER, LYOPHILIZED, FOR SOLUTION INTRAMUSCULAR; INTRAVENOUS EVERY 6 HOURS
Status: COMPLETED | OUTPATIENT
Start: 2024-10-23 | End: 2024-10-25

## 2024-10-23 RX ORDER — HEPARIN SODIUM 5000 [USP'U]/ML
5000 INJECTION, SOLUTION INTRAVENOUS; SUBCUTANEOUS EVERY 8 HOURS SCHEDULED
Status: DISCONTINUED | OUTPATIENT
Start: 2024-10-23 | End: 2024-10-25

## 2024-10-23 RX ORDER — SODIUM CHLORIDE 0.9 % (FLUSH) 0.9 %
5-40 SYRINGE (ML) INJECTION PRN
Status: DISCONTINUED | OUTPATIENT
Start: 2024-10-23 | End: 2024-10-28 | Stop reason: HOSPADM

## 2024-10-23 RX ORDER — INSULIN LISPRO 100 [IU]/ML
12 INJECTION, SOLUTION INTRAVENOUS; SUBCUTANEOUS
Status: DISCONTINUED | OUTPATIENT
Start: 2024-10-23 | End: 2024-10-27

## 2024-10-23 RX ORDER — ATORVASTATIN CALCIUM 40 MG/1
40 TABLET, FILM COATED ORAL EVERY MORNING
Status: DISCONTINUED | OUTPATIENT
Start: 2024-10-23 | End: 2024-10-28 | Stop reason: HOSPADM

## 2024-10-23 RX ORDER — INSULIN GLARGINE 100 [IU]/ML
12 INJECTION, SOLUTION SUBCUTANEOUS EVERY MORNING
Status: DISCONTINUED | OUTPATIENT
Start: 2024-10-23 | End: 2024-10-26

## 2024-10-23 RX ORDER — SODIUM CHLORIDE 9 MG/ML
INJECTION, SOLUTION INTRAVENOUS PRN
Status: DISCONTINUED | OUTPATIENT
Start: 2024-10-23 | End: 2024-10-28 | Stop reason: HOSPADM

## 2024-10-23 RX ORDER — ACETAMINOPHEN 325 MG/1
650 TABLET ORAL EVERY 6 HOURS PRN
Status: DISCONTINUED | OUTPATIENT
Start: 2024-10-23 | End: 2024-10-28 | Stop reason: HOSPADM

## 2024-10-23 RX ORDER — PREDNISONE 20 MG/1
40 TABLET ORAL DAILY
Status: COMPLETED | OUTPATIENT
Start: 2024-10-25 | End: 2024-10-27

## 2024-10-23 RX ORDER — ONDANSETRON 2 MG/ML
4 INJECTION INTRAMUSCULAR; INTRAVENOUS EVERY 6 HOURS PRN
Status: DISCONTINUED | OUTPATIENT
Start: 2024-10-23 | End: 2024-10-28 | Stop reason: HOSPADM

## 2024-10-23 RX ORDER — CALCIUM ACETATE 667 MG/1
2 CAPSULE ORAL
Status: DISCONTINUED | OUTPATIENT
Start: 2024-10-23 | End: 2024-10-28 | Stop reason: HOSPADM

## 2024-10-23 RX ORDER — MIDODRINE HYDROCHLORIDE 5 MG/1
10 TABLET ORAL 2 TIMES DAILY PRN
Status: DISCONTINUED | OUTPATIENT
Start: 2024-10-23 | End: 2024-10-28 | Stop reason: HOSPADM

## 2024-10-23 RX ORDER — ONDANSETRON 4 MG/1
4 TABLET, ORALLY DISINTEGRATING ORAL EVERY 8 HOURS PRN
Status: DISCONTINUED | OUTPATIENT
Start: 2024-10-23 | End: 2024-10-28 | Stop reason: HOSPADM

## 2024-10-23 RX ORDER — SODIUM CHLORIDE 0.9 % (FLUSH) 0.9 %
5-40 SYRINGE (ML) INJECTION EVERY 12 HOURS SCHEDULED
Status: DISCONTINUED | OUTPATIENT
Start: 2024-10-23 | End: 2024-10-28 | Stop reason: HOSPADM

## 2024-10-23 RX ORDER — DEXTROMETHORPHAN HYDROBROMIDE AND PROMETHAZINE HYDROCHLORIDE 15; 6.25 MG/5ML; MG/5ML
5 SYRUP ORAL EVERY 4 HOURS PRN
Status: DISCONTINUED | OUTPATIENT
Start: 2024-10-23 | End: 2024-10-23 | Stop reason: RX

## 2024-10-23 RX ORDER — IPRATROPIUM BROMIDE AND ALBUTEROL SULFATE 2.5; .5 MG/3ML; MG/3ML
1 SOLUTION RESPIRATORY (INHALATION)
Status: DISCONTINUED | OUTPATIENT
Start: 2024-10-23 | End: 2024-10-28 | Stop reason: HOSPADM

## 2024-10-23 RX ORDER — POLYETHYLENE GLYCOL 3350 17 G/17G
17 POWDER, FOR SOLUTION ORAL DAILY PRN
Status: DISCONTINUED | OUTPATIENT
Start: 2024-10-23 | End: 2024-10-28 | Stop reason: HOSPADM

## 2024-10-23 RX ORDER — ARFORMOTEROL TARTRATE 15 UG/2ML
15 SOLUTION RESPIRATORY (INHALATION)
Status: DISCONTINUED | OUTPATIENT
Start: 2024-10-23 | End: 2024-10-28 | Stop reason: HOSPADM

## 2024-10-23 RX ORDER — GUAIFENESIN AND DEXTROMETHORPHAN HYDROBROMIDE 20; 400 MG/1; MG/1
1 TABLET ORAL EVERY 4 HOURS PRN
Status: DISCONTINUED | OUTPATIENT
Start: 2024-10-23 | End: 2024-10-24

## 2024-10-23 RX ADMIN — INSULIN LISPRO 12 UNITS: 100 INJECTION, SOLUTION INTRAVENOUS; SUBCUTANEOUS at 09:18

## 2024-10-23 RX ADMIN — IPRATROPIUM BROMIDE AND ALBUTEROL SULFATE 1 DOSE: 2.5; .5 SOLUTION RESPIRATORY (INHALATION) at 21:54

## 2024-10-23 RX ADMIN — METHYLPREDNISOLONE SODIUM SUCCINATE 40 MG: 40 INJECTION INTRAMUSCULAR; INTRAVENOUS at 16:52

## 2024-10-23 RX ADMIN — HEPARIN SODIUM 5000 UNITS: 5000 INJECTION INTRAVENOUS; SUBCUTANEOUS at 16:52

## 2024-10-23 RX ADMIN — MICONAZOLE NITRATE: 20 POWDER TOPICAL at 09:17

## 2024-10-23 RX ADMIN — MICONAZOLE NITRATE: 2 OINTMENT TOPICAL at 09:17

## 2024-10-23 RX ADMIN — METHYLPREDNISOLONE SODIUM SUCCINATE 40 MG: 40 INJECTION INTRAMUSCULAR; INTRAVENOUS at 09:19

## 2024-10-23 RX ADMIN — INSULIN LISPRO 12 UNITS: 100 INJECTION, SOLUTION INTRAVENOUS; SUBCUTANEOUS at 16:52

## 2024-10-23 RX ADMIN — DEXTROMETHORPHAN HYDROBROMIDE / GUAIFENESIN 1 TABLET: 20; 400 TABLET ORAL at 09:16

## 2024-10-23 RX ADMIN — CALCIUM ACETATE 1334 MG: 667 CAPSULE ORAL at 09:18

## 2024-10-23 RX ADMIN — ARFORMOTEROL TARTRATE 15 MCG: 15 SOLUTION RESPIRATORY (INHALATION) at 08:35

## 2024-10-23 RX ADMIN — SODIUM CHLORIDE, PRESERVATIVE FREE 10 ML: 5 INJECTION INTRAVENOUS at 20:40

## 2024-10-23 RX ADMIN — ATORVASTATIN CALCIUM 40 MG: 40 TABLET, FILM COATED ORAL at 09:18

## 2024-10-23 RX ADMIN — ONDANSETRON 4 MG: 2 INJECTION, SOLUTION INTRAMUSCULAR; INTRAVENOUS at 08:07

## 2024-10-23 RX ADMIN — METHYLPREDNISOLONE SODIUM SUCCINATE 40 MG: 40 INJECTION INTRAMUSCULAR; INTRAVENOUS at 23:00

## 2024-10-23 RX ADMIN — CALCIUM ACETATE 1334 MG: 667 CAPSULE ORAL at 16:52

## 2024-10-23 RX ADMIN — DEXTROMETHORPHAN HYDROBROMIDE / GUAIFENESIN 1 TABLET: 20; 400 TABLET ORAL at 23:00

## 2024-10-23 RX ADMIN — HEPARIN SODIUM 5000 UNITS: 5000 INJECTION INTRAVENOUS; SUBCUTANEOUS at 09:26

## 2024-10-23 RX ADMIN — ASPIRIN 81 MG CHEWABLE TABLET 81 MG: 81 TABLET CHEWABLE at 09:18

## 2024-10-23 RX ADMIN — SODIUM CHLORIDE 500 ML: 9 INJECTION, SOLUTION INTRAVENOUS at 00:57

## 2024-10-23 RX ADMIN — INSULIN GLARGINE 12 UNITS: 100 INJECTION, SOLUTION SUBCUTANEOUS at 09:19

## 2024-10-23 RX ADMIN — IPRATROPIUM BROMIDE AND ALBUTEROL SULFATE 1 DOSE: 2.5; .5 SOLUTION RESPIRATORY (INHALATION) at 08:35

## 2024-10-23 RX ADMIN — ARFORMOTEROL TARTRATE 15 MCG: 15 SOLUTION RESPIRATORY (INHALATION) at 21:53

## 2024-10-23 RX ADMIN — ONDANSETRON 4 MG: 2 INJECTION, SOLUTION INTRAMUSCULAR; INTRAVENOUS at 00:56

## 2024-10-23 NOTE — FLOWSHEET NOTE
10/23/24 1607   Vital Signs   BP (!) 96/50   Temp 97.8 °F (36.6 °C)   Pulse 67   Respirations 18   Weight - Scale   (er cart)   Pain Assessment   Pain Assessment None - Denies Pain   Post-Hemodialysis Assessment   Post-Treatment Procedures Blood returned;Access bleeding time < 10 minutes   Machine Disinfection Process Exterior Machine Disinfection   Rinseback Volume (ml) 300 ml   Blood Volume Processed (Liters) 84.7 L   Dialyzer Clearance Moderately streaked   Duration of Treatment (minutes) 231 minutes   Hemodialysis Intake (ml) 300 ml   Hemodialysis Output (ml) 3000 ml   NET Removed (ml) 2700   Tolerated Treatment Good   Patient Response to Treatment tolerated tx well, 2700 net removed, VSS, needles pulled and hemostasis achieved. system clotted earlier w venous blood lost, Dr Mcdowell aware w NNO. post report to Mary Herrera RN   Bilateral Breath Sounds Diminished   Edema Right lower extremity;Left lower extremity   Time Off 1602   Patient Disposition Return to room   Observations & Evaluations   Level of Consciousness 0   Oriented X 3   Heart Rhythm Regular   Respiratory Quality/Effort Unlabored   O2 Device Nasal cannula   Skin Color Pink   Skin Condition/Temp Dry;Warm

## 2024-10-23 NOTE — H&P
History & Physicial  Lizette Murray  [id number]  1955  10/23/24  Primary Care:  Lourdes Murrell MD  [address]  / [place] [address]        Chief Complaint   Patient presents with    Nausea    Chest Pain     x2days    Cough       HPI:    Lizette is a 69-year-old female with a pre-existing history of aortic stenosis.  She was at home when she developed chest discomfort that was concerning for her.  I was requested to evaluate her for her chest discomfort.  She did miss dialysis in the last 24 hours.  She states that the pain was severe and took her breath away.    My evaluation detects a right sided reproducible costal pain along the sternal border.  This is different than the reproducible back pain that was present.  As I palpated the vertebral body she had some discomfort towards the upper thoracic region.  However this pain was not consistent with the pain that she had felt.  As I palpated the lower portion of the costal sternal border I did detect discomfort that made her adjust her posture.  She did state that that was the pain she was feeling.  I then auscultated her and was able to hear bilateral wheezing.  However the worst part of the wheezing was towards the right side.  I will keep her in the hospital on an observation status to evaluate her bronchitis, get her back on her dialysis schedule and ultimately reassure her that we have not missed anything from a cardiac perspective.      Prior to Visit Medications    Medication Sig Taking? Authorizing Provider   insulin glargine (BASAGLAR KWIKPEN) 100 UNIT/ML injection pen Inject 12 Units into the skin every morning  Lourdes Murrell MD   FIASP FLEXTOUCH 100 UNIT/ML SOPN Inject 12 Units into the skin 3 times daily (before meals) PER SLIDING SCALE: UNK  Provider, MD Joon   atorvastatin (LIPITOR) 40 MG tablet Take 1 tablet by mouth every morning  Lourdes Murrell MD   midodrine (PROAMATINE) 10 MG tablet Take 1 tablet by mouth 2 times daily as

## 2024-10-23 NOTE — CONSULTS
[facility]  MD Sidney Amezcua MD Ali Hassan, MD Lisa Kniska, YOSHI Eckert CNP  Consultation    Patient's Name: Lizette Murray  5:40 PM  10/23/2024    Nephrologist: Sidney Mcdowell MD    Reason for Consult: ESRD management  Requesting Physician:  Lourdes Murrell MD    Chief Complaint: Chest pain    History Obtained From: Patient, chart    History of Present Ilness:    Ms. Murray is a 69-year-old woman with ESRD who undergoes chronic intermittent hemodialysis on Tuesdays Thursdays and Saturdays at the [facility].  She uses a tunneled dialysis catheter as attempt to place AVF and AVG have been unsuccessful.  With a 3-day history of progressive nonproductive cough, wheeze, generalized myalgias without fever or chill, nausea or vomiting.  Felt so poorly she postponed her dialysis yesterday planning on attending today, though yesterday developed substernal chest discomfort described as achy/crampy without radiation though associated with some dyspnea and mild nausea, so she presented to the ED.  She tells me she has \"bronchial pneumonia.\"  She has been diagnosed with bronchitis.    At the time of my initial evaluation this afternoon she was undergoing hemodialysis.  Treatment has been without complication.    Past Medical History:   Diagnosis Date    Acute on chronic heart failure with preserved ejection fraction (HCC) 02/07/2023    Acute pulmonary embolism (HCC) 12/03/2022    Anemia due to stage 5 chronic kidney disease, not on chronic dialysis ([facility]) 02/08/2023    Anemia in CKD (chronic kidney disease) 11/30/2021    Anxiety and depression 01/19/2022    Aortic stenosis, mild 02/08/2023 12/2022    At high risk for falls 01/19/2022    Brain aneurysm     CLABSI (central line-associated bloodstream infection) 11/19/2021    Cough 01/19/2022    Diabetes mellitus ([facility]) 2006    Diabetes mellitus type 2 with complications ([facility])

## 2024-10-23 NOTE — ED NOTES
Nurse to nurse report called to Shanti on 5S. Notified patient was in dialysis then would be coming to the floor.

## 2024-10-23 NOTE — ED PROVIDER NOTES
[facility] EMERGENCY DEPARTMENT  EMERGENCY DEPARTMENT ENCOUNTER        Pt Name: Lizette Murray  MRN: [id number]  Birthdate 1955  Date of evaluation: 10/22/2024  Provider: Kaitlin Monk MD  PCP: Lourdes Murrell MD  Note Started: 11:59 PM EDT 10/22/24    HPI  69 y.o. female on 2 L nasal cannula chronically presenting for cough, chest pain, nausea, shortness of breath.  Patient complains of feeling ill for the past few days.  She is had cough with green sputum and clear runny nose.  She is been on a [employer].  She complains of right-sided chest pain, nausea, dry heaves, decreased appetite.  She endorses occasional abdominal pain.  She is ESRD on dialysis, supposed to get treatment today but canceled.  She complains of shortness of breath and has had to turn her oxygen up to 4 L.  She denies any fevers or diarrhea.      --------------------------------------------- PAST HISTORY ---------------------------------------------  Past Medical History:  has a past medical history of Acute on chronic heart failure with preserved ejection fraction (HCC), Acute pulmonary embolism (HCC), Anemia due to stage 5 chronic kidney disease, not on chronic dialysis (HCC), Anemia in CKD (chronic kidney disease), Anxiety and depression, Aortic stenosis, mild, At high risk for falls, Brain aneurysm, CLABSI (central line-associated bloodstream infection), Cough, Diabetes mellitus (HCC), Diabetes mellitus type 2 with complications (HCC), Dizziness on standing, Encounter regarding vascular access for dialysis for ESRD (HCC), End-stage renal disease on hemodialysis (HCC), ESRD (end stage renal disease) (HCC), ESRD on hemodialysis (HCC), Essential hypertension, Fever, unspecified, Gastrointestinal hemorrhage, H/O heart artery stent, History of Clostridioides difficile colitis, History of pulmonary embolus (PE), Hyperlipidemia, Hypertension, Leg swelling, Lymphedema of both lower extremities, Mild pulmonary

## 2024-10-24 LAB
ALBUMIN SERPL-MCNC: 4 G/DL (ref 3.5–5.2)
ALP SERPL-CCNC: 135 U/L (ref 35–104)
ALT SERPL-CCNC: 28 U/L (ref 0–32)
ANION GAP SERPL CALCULATED.3IONS-SCNC: 17 MMOL/L (ref 7–16)
AST SERPL-CCNC: 29 U/L (ref 0–31)
BASOPHILS # BLD: 0 K/UL (ref 0–0.2)
BASOPHILS NFR BLD: 0 % (ref 0–2)
BILIRUB SERPL-MCNC: 0.4 MG/DL (ref 0–1.2)
BUN SERPL-MCNC: 39 MG/DL (ref 6–23)
CALCIUM SERPL-MCNC: 8.8 MG/DL (ref 8.6–10.2)
CHLORIDE SERPL-SCNC: 89 MMOL/L (ref 98–107)
CO2 SERPL-SCNC: 27 MMOL/L (ref 22–29)
CREAT SERPL-MCNC: 5.6 MG/DL (ref 0.5–1)
EOSINOPHIL # BLD: 0 K/UL (ref 0.05–0.5)
EOSINOPHILS RELATIVE PERCENT: 0 % (ref 0–6)
ERYTHROCYTE [DISTWIDTH] IN BLOOD BY AUTOMATED COUNT: 15.4 % (ref 11.5–15)
GFR, ESTIMATED: 8 ML/MIN/1.73M2
GLUCOSE BLD-MCNC: 212 MG/DL (ref 74–99)
GLUCOSE BLD-MCNC: 289 MG/DL (ref 74–99)
GLUCOSE BLD-MCNC: 354 MG/DL (ref 74–99)
GLUCOSE BLD-MCNC: 369 MG/DL (ref 74–99)
GLUCOSE SERPL-MCNC: 299 MG/DL (ref 74–99)
HCT VFR BLD AUTO: 38.3 % (ref 34–48)
HGB BLD-MCNC: 11.8 G/DL (ref 11.5–15.5)
IMM GRANULOCYTES # BLD AUTO: 0.03 K/UL (ref 0–0.58)
IMM GRANULOCYTES NFR BLD: 1 % (ref 0–5)
LYMPHOCYTES NFR BLD: 0.24 K/UL (ref 1.5–4)
LYMPHOCYTES RELATIVE PERCENT: 5 % (ref 20–42)
MAGNESIUM SERPL-MCNC: 2.2 MG/DL (ref 1.6–2.6)
MCH RBC QN AUTO: 31.6 PG (ref 26–35)
MCHC RBC AUTO-ENTMCNC: 30.8 G/DL (ref 32–34.5)
MCV RBC AUTO: 102.4 FL (ref 80–99.9)
MONOCYTES NFR BLD: 0.09 K/UL (ref 0.1–0.95)
MONOCYTES NFR BLD: 2 % (ref 2–12)
NEUTROPHILS NFR BLD: 93 % (ref 43–80)
NEUTS SEG NFR BLD: 4.86 K/UL (ref 1.8–7.3)
PHOSPHATE SERPL-MCNC: 6.8 MG/DL (ref 2.5–4.5)
PLATELET, FLUORESCENCE: 112 K/UL (ref 130–450)
PMV BLD AUTO: 11.9 FL (ref 7–12)
POTASSIUM SERPL-SCNC: 4.8 MMOL/L (ref 3.5–5)
PROT SERPL-MCNC: 7.1 G/DL (ref 6.4–8.3)
RBC # BLD AUTO: 3.74 M/UL (ref 3.5–5.5)
RBC # BLD: ABNORMAL 10*6/UL
SODIUM SERPL-SCNC: 133 MMOL/L (ref 132–146)
WBC OTHER # BLD: 5.2 K/UL (ref 4.5–11.5)

## 2024-10-24 PROCEDURE — 6370000000 HC RX 637 (ALT 250 FOR IP): Performed by: INTERNAL MEDICINE

## 2024-10-24 PROCEDURE — 6360000002 HC RX W HCPCS: Performed by: INTERNAL MEDICINE

## 2024-10-24 PROCEDURE — 2700000000 HC OXYGEN THERAPY PER DAY

## 2024-10-24 PROCEDURE — 83735 ASSAY OF MAGNESIUM: CPT

## 2024-10-24 PROCEDURE — 2500000003 HC RX 250 WO HCPCS: Performed by: INTERNAL MEDICINE

## 2024-10-24 PROCEDURE — 94640 AIRWAY INHALATION TREATMENT: CPT

## 2024-10-24 PROCEDURE — 96376 TX/PRO/DX INJ SAME DRUG ADON: CPT

## 2024-10-24 PROCEDURE — 80053 COMPREHEN METABOLIC PANEL: CPT

## 2024-10-24 PROCEDURE — 90935 HEMODIALYSIS ONE EVALUATION: CPT

## 2024-10-24 PROCEDURE — 96372 THER/PROPH/DIAG INJ SC/IM: CPT

## 2024-10-24 PROCEDURE — G0378 HOSPITAL OBSERVATION PER HR: HCPCS

## 2024-10-24 PROCEDURE — 2580000003 HC RX 258: Performed by: INTERNAL MEDICINE

## 2024-10-24 PROCEDURE — 99232 SBSQ HOSP IP/OBS MODERATE 35: CPT | Performed by: STUDENT IN AN ORGANIZED HEALTH CARE EDUCATION/TRAINING PROGRAM

## 2024-10-24 PROCEDURE — 84100 ASSAY OF PHOSPHORUS: CPT

## 2024-10-24 PROCEDURE — 87081 CULTURE SCREEN ONLY: CPT

## 2024-10-24 PROCEDURE — 85025 COMPLETE CBC W/AUTO DIFF WBC: CPT

## 2024-10-24 PROCEDURE — 94667 MNPJ CHEST WALL 1ST: CPT

## 2024-10-24 PROCEDURE — 6370000000 HC RX 637 (ALT 250 FOR IP): Performed by: STUDENT IN AN ORGANIZED HEALTH CARE EDUCATION/TRAINING PROGRAM

## 2024-10-24 PROCEDURE — 82962 GLUCOSE BLOOD TEST: CPT

## 2024-10-24 RX ORDER — GUAIFENESIN 400 MG/1
400 TABLET ORAL EVERY 8 HOURS
Status: DISCONTINUED | OUTPATIENT
Start: 2024-10-24 | End: 2024-10-28 | Stop reason: HOSPADM

## 2024-10-24 RX ORDER — LEVOFLOXACIN 250 MG/1
250 TABLET, FILM COATED ORAL DAILY
Status: DISCONTINUED | OUTPATIENT
Start: 2024-10-25 | End: 2024-10-28 | Stop reason: HOSPADM

## 2024-10-24 RX ADMIN — HEPARIN SODIUM 5000 UNITS: 5000 INJECTION INTRAVENOUS; SUBCUTANEOUS at 00:59

## 2024-10-24 RX ADMIN — INSULIN LISPRO 12 UNITS: 100 INJECTION, SOLUTION INTRAVENOUS; SUBCUTANEOUS at 08:12

## 2024-10-24 RX ADMIN — METHYLPREDNISOLONE SODIUM SUCCINATE 40 MG: 40 INJECTION INTRAMUSCULAR; INTRAVENOUS at 11:34

## 2024-10-24 RX ADMIN — INSULIN LISPRO 12 UNITS: 100 INJECTION, SOLUTION INTRAVENOUS; SUBCUTANEOUS at 19:04

## 2024-10-24 RX ADMIN — SODIUM CHLORIDE, PRESERVATIVE FREE 10 ML: 5 INJECTION INTRAVENOUS at 21:42

## 2024-10-24 RX ADMIN — INSULIN GLARGINE 12 UNITS: 100 INJECTION, SOLUTION SUBCUTANEOUS at 08:11

## 2024-10-24 RX ADMIN — SODIUM CHLORIDE, PRESERVATIVE FREE 10 ML: 5 INJECTION INTRAVENOUS at 08:13

## 2024-10-24 RX ADMIN — IPRATROPIUM BROMIDE AND ALBUTEROL SULFATE 1 DOSE: 2.5; .5 SOLUTION RESPIRATORY (INHALATION) at 07:58

## 2024-10-24 RX ADMIN — INSULIN LISPRO 12 UNITS: 100 INJECTION, SOLUTION INTRAVENOUS; SUBCUTANEOUS at 11:34

## 2024-10-24 RX ADMIN — LEVOFLOXACIN 750 MG: 500 TABLET, FILM COATED ORAL at 11:34

## 2024-10-24 RX ADMIN — HEPARIN SODIUM 5000 UNITS: 5000 INJECTION INTRAVENOUS; SUBCUTANEOUS at 19:03

## 2024-10-24 RX ADMIN — METHYLPREDNISOLONE SODIUM SUCCINATE 40 MG: 40 INJECTION INTRAMUSCULAR; INTRAVENOUS at 19:04

## 2024-10-24 RX ADMIN — METHYLPREDNISOLONE SODIUM SUCCINATE 40 MG: 40 INJECTION INTRAMUSCULAR; INTRAVENOUS at 06:14

## 2024-10-24 RX ADMIN — CALCIUM ACETATE 1334 MG: 667 CAPSULE ORAL at 08:10

## 2024-10-24 RX ADMIN — ARFORMOTEROL TARTRATE 15 MCG: 15 SOLUTION RESPIRATORY (INHALATION) at 21:38

## 2024-10-24 RX ADMIN — CALCIUM ACETATE 1334 MG: 667 CAPSULE ORAL at 19:03

## 2024-10-24 RX ADMIN — CALCIUM ACETATE 1334 MG: 667 CAPSULE ORAL at 11:33

## 2024-10-24 RX ADMIN — ASPIRIN 81 MG CHEWABLE TABLET 81 MG: 81 TABLET CHEWABLE at 08:10

## 2024-10-24 RX ADMIN — MIDODRINE HYDROCHLORIDE 10 MG: 5 TABLET ORAL at 11:35

## 2024-10-24 RX ADMIN — IPRATROPIUM BROMIDE AND ALBUTEROL SULFATE 1 DOSE: 2.5; .5 SOLUTION RESPIRATORY (INHALATION) at 21:38

## 2024-10-24 RX ADMIN — IPRATROPIUM BROMIDE AND ALBUTEROL SULFATE 1 DOSE: 2.5; .5 SOLUTION RESPIRATORY (INHALATION) at 13:19

## 2024-10-24 RX ADMIN — ATORVASTATIN CALCIUM 40 MG: 40 TABLET, FILM COATED ORAL at 08:10

## 2024-10-24 RX ADMIN — HEPARIN SODIUM 5000 UNITS: 5000 INJECTION INTRAVENOUS; SUBCUTANEOUS at 08:11

## 2024-10-24 RX ADMIN — GUAIFENESIN 400 MG: 400 TABLET ORAL at 11:33

## 2024-10-24 RX ADMIN — ARFORMOTEROL TARTRATE 15 MCG: 15 SOLUTION RESPIRATORY (INHALATION) at 07:58

## 2024-10-24 RX ADMIN — GUAIFENESIN 400 MG: 400 TABLET ORAL at 19:03

## 2024-10-24 ASSESSMENT — PAIN SCALES - WONG BAKER: WONGBAKER_NUMERICALRESPONSE: NO HURT

## 2024-10-24 ASSESSMENT — PAIN SCALES - GENERAL
PAINLEVEL_OUTOF10: 0

## 2024-10-24 NOTE — CARE COORDINATION
Initial CM Assessment-Met with patient at the bedside, introduced myself and CM role in discharge planing/care coordination.  Patient is from home with her daughter in a one story home. She has a ww and wc. Patient has used [facility] in the past. Home oxygen baseline is 2 liters from [employer]. Dialysis @ [facility] [phone] -TTS at 5:30am. PCP is Dr. Liliana Murrell, preferred pharmacy is [facility] in [place]. Discharge plan is home, no needs, her daughter Meghann to transport home.     [place] ISRAELN, RN  [facility]

## 2024-10-24 NOTE — PLAN OF CARE
Problem: Chronic Conditions and Co-morbidities  Goal: Patient's chronic conditions and co-morbidity symptoms are monitored and maintained or improved  Outcome: Progressing     Problem: Discharge Planning  Goal: Discharge to home or other facility with appropriate resources  Outcome: Progressing     Problem: Safety - Adult  Goal: Free from fall injury  Outcome: Progressing     Problem: Chronic Conditions and Co-morbidities  Goal: Patient's chronic conditions and co-morbidity symptoms are monitored and maintained or improved  Outcome: Progressing     Problem: Discharge Planning  Goal: Discharge to home or other facility with appropriate resources  Outcome: Progressing     Problem: Safety - Adult  Goal: Free from fall injury  Outcome: Progressing

## 2024-10-24 NOTE — PLAN OF CARE
Problem: Chronic Conditions and Co-morbidities  Goal: Patient's chronic conditions and co-morbidity symptoms are monitored and maintained or improved  10/24/2024 1046 by Elizabeth Lawson RN  Outcome: Progressing  10/24/2024 0318 by Nargis Fonseca RN  Outcome: Progressing     Problem: Discharge Planning  Goal: Discharge to home or other facility with appropriate resources  10/24/2024 1046 by Elizabeth Lawson RN  Outcome: Progressing  10/24/2024 0318 by Nargis Fonseca RN  Outcome: Progressing     Problem: Safety - Adult  Goal: Free from fall injury  10/24/2024 1046 by Elizabeth Lawson RN  Outcome: Progressing  10/24/2024 0318 by Nargis Fonseca RN  Outcome: Progressing

## 2024-10-24 NOTE — FLOWSHEET NOTE
10/24/24 1759   Vital Signs   /70   Temp 97.2 °F (36.2 °C)   Pulse 67   Respirations 18   Weight - Scale 117.6 kg (259 lb 4.2 oz)   Weight Method Bed scale   Percent Weight Change 0.49   Post-Hemodialysis Assessment   Post-Treatment Procedures Blood returned;Access bleeding time < 10 minutes   Machine Disinfection Process Acid/Vinegar Clean;Heat Disinfect;Exterior Machine Disinfection   Rinseback Volume (ml) 300 ml   Blood Volume Processed (Liters) 80.5 L   Dialyzer Clearance Moderately streaked   Duration of Treatment (minutes) 225 minutes   Heparin Amount Administered During Treatment (mL) 0 mL   Hemodialysis Intake (ml) 300 ml   Hemodialysis Output (ml) 1800 ml   NET Removed (ml) 1500   Tolerated Treatment Good   Interventions Taken Ultrafiltration goal decreased   Patient Response to Treatment Tolerated tx well, 1.5L fluid removed, uf goal decreased to maintain BP   Bilateral Breath Sounds Diminished;Expiratory wheezes   Edema Right lower extremity;Left lower extremity   RLE Edema +1   LLE Edema +1   Time Off 4319   Patient Disposition Return to room   Observations & Evaluations   Level of Consciousness 0   Oriented X 3   Heart Rhythm Regular   Respiratory Quality/Effort Unlabored   O2 Device Nasal cannula   Skin Condition/Temp Dry;Warm     Tolerated  HD 3.75 hr on 2 k 2.5 ca bath, 1500 ml removed with out difficulty.  Needles removed and pressure held until hemostasis achieved and dressing applied. Pt alert and vitals stable at time of transport, report to RN, patient remains in care of staff.

## 2024-10-25 LAB
ALBUMIN SERPL-MCNC: 3.9 G/DL (ref 3.5–5.2)
ALP SERPL-CCNC: 132 U/L (ref 35–104)
ALT SERPL-CCNC: 22 U/L (ref 0–32)
ANION GAP SERPL CALCULATED.3IONS-SCNC: 18 MMOL/L (ref 7–16)
AST SERPL-CCNC: 20 U/L (ref 0–31)
BASOPHILS # BLD: 0.01 K/UL (ref 0–0.2)
BASOPHILS NFR BLD: 0 % (ref 0–2)
BILIRUB SERPL-MCNC: 0.4 MG/DL (ref 0–1.2)
BUN SERPL-MCNC: 39 MG/DL (ref 6–23)
CALCIUM SERPL-MCNC: 9.2 MG/DL (ref 8.6–10.2)
CHLORIDE SERPL-SCNC: 88 MMOL/L (ref 98–107)
CO2 SERPL-SCNC: 28 MMOL/L (ref 22–29)
CREAT SERPL-MCNC: 4.6 MG/DL (ref 0.5–1)
D-DIMER QUANTITATIVE: 1619 NG/ML DDU (ref 0–230)
EKG ATRIAL RATE: 83 BPM
EKG P AXIS: -9 DEGREES
EKG P-R INTERVAL: 178 MS
EKG Q-T INTERVAL: 408 MS
EKG QRS DURATION: 86 MS
EKG QTC CALCULATION (BAZETT): 479 MS
EKG T AXIS: 103 DEGREES
EKG VENTRICULAR RATE: 83 BPM
EOSINOPHIL # BLD: 0 K/UL (ref 0.05–0.5)
EOSINOPHILS RELATIVE PERCENT: 0 % (ref 0–6)
ERYTHROCYTE [DISTWIDTH] IN BLOOD BY AUTOMATED COUNT: 15.7 % (ref 11.5–15)
GFR, ESTIMATED: 10 ML/MIN/1.73M2
GLUCOSE BLD-MCNC: 322 MG/DL (ref 74–99)
GLUCOSE BLD-MCNC: 372 MG/DL (ref 74–99)
GLUCOSE BLD-MCNC: 377 MG/DL (ref 74–99)
GLUCOSE BLD-MCNC: 379 MG/DL (ref 74–99)
GLUCOSE SERPL-MCNC: 362 MG/DL (ref 74–99)
HCT VFR BLD AUTO: 37 % (ref 34–48)
HGB BLD-MCNC: 11.3 G/DL (ref 11.5–15.5)
IMM GRANULOCYTES # BLD AUTO: 0.09 K/UL (ref 0–0.58)
IMM GRANULOCYTES NFR BLD: 1 % (ref 0–5)
INR PPP: 1.1
LYMPHOCYTES NFR BLD: 0.28 K/UL (ref 1.5–4)
LYMPHOCYTES RELATIVE PERCENT: 3 % (ref 20–42)
MCH RBC QN AUTO: 31.7 PG (ref 26–35)
MCHC RBC AUTO-ENTMCNC: 30.5 G/DL (ref 32–34.5)
MCV RBC AUTO: 103.6 FL (ref 80–99.9)
MONOCYTES NFR BLD: 0.34 K/UL (ref 0.1–0.95)
MONOCYTES NFR BLD: 4 % (ref 2–12)
NEUTROPHILS NFR BLD: 93 % (ref 43–80)
NEUTS SEG NFR BLD: 8.83 K/UL (ref 1.8–7.3)
PLATELET # BLD AUTO: 116 K/UL (ref 130–450)
PMV BLD AUTO: 11.8 FL (ref 7–12)
POTASSIUM SERPL-SCNC: 4.4 MMOL/L (ref 3.5–5)
PROT SERPL-MCNC: 7 G/DL (ref 6.4–8.3)
PROTHROMBIN TIME: 11.9 SEC (ref 9.3–12.4)
RBC # BLD AUTO: 3.57 M/UL (ref 3.5–5.5)
RBC # BLD: ABNORMAL 10*6/UL
SODIUM SERPL-SCNC: 134 MMOL/L (ref 132–146)
TROPONIN I SERPL HS-MCNC: 72 NG/L (ref 0–9)
WBC OTHER # BLD: 9.6 K/UL (ref 4.5–11.5)

## 2024-10-25 PROCEDURE — 85379 FIBRIN DEGRADATION QUANT: CPT

## 2024-10-25 PROCEDURE — 2500000003 HC RX 250 WO HCPCS: Performed by: STUDENT IN AN ORGANIZED HEALTH CARE EDUCATION/TRAINING PROGRAM

## 2024-10-25 PROCEDURE — 2500000003 HC RX 250 WO HCPCS: Performed by: INTERNAL MEDICINE

## 2024-10-25 PROCEDURE — 85025 COMPLETE CBC W/AUTO DIFF WBC: CPT

## 2024-10-25 PROCEDURE — 2700000000 HC OXYGEN THERAPY PER DAY

## 2024-10-25 PROCEDURE — 99233 SBSQ HOSP IP/OBS HIGH 50: CPT | Performed by: STUDENT IN AN ORGANIZED HEALTH CARE EDUCATION/TRAINING PROGRAM

## 2024-10-25 PROCEDURE — 6370000000 HC RX 637 (ALT 250 FOR IP): Performed by: INTERNAL MEDICINE

## 2024-10-25 PROCEDURE — 1200000000 HC SEMI PRIVATE

## 2024-10-25 PROCEDURE — 93005 ELECTROCARDIOGRAM TRACING: CPT | Performed by: STUDENT IN AN ORGANIZED HEALTH CARE EDUCATION/TRAINING PROGRAM

## 2024-10-25 PROCEDURE — 80053 COMPREHEN METABOLIC PANEL: CPT

## 2024-10-25 PROCEDURE — 96372 THER/PROPH/DIAG INJ SC/IM: CPT

## 2024-10-25 PROCEDURE — 6370000000 HC RX 637 (ALT 250 FOR IP): Performed by: STUDENT IN AN ORGANIZED HEALTH CARE EDUCATION/TRAINING PROGRAM

## 2024-10-25 PROCEDURE — 6360000002 HC RX W HCPCS: Performed by: INTERNAL MEDICINE

## 2024-10-25 PROCEDURE — 94640 AIRWAY INHALATION TREATMENT: CPT

## 2024-10-25 PROCEDURE — G0378 HOSPITAL OBSERVATION PER HR: HCPCS

## 2024-10-25 PROCEDURE — 36415 COLL VENOUS BLD VENIPUNCTURE: CPT

## 2024-10-25 PROCEDURE — 2580000003 HC RX 258

## 2024-10-25 PROCEDURE — 2580000003 HC RX 258: Performed by: INTERNAL MEDICINE

## 2024-10-25 PROCEDURE — 85610 PROTHROMBIN TIME: CPT

## 2024-10-25 PROCEDURE — 84484 ASSAY OF TROPONIN QUANT: CPT

## 2024-10-25 PROCEDURE — 93010 ELECTROCARDIOGRAM REPORT: CPT | Performed by: INTERNAL MEDICINE

## 2024-10-25 PROCEDURE — 96376 TX/PRO/DX INJ SAME DRUG ADON: CPT

## 2024-10-25 PROCEDURE — 82962 GLUCOSE BLOOD TEST: CPT

## 2024-10-25 RX ORDER — PREDNISONE 10 MG/1
TABLET ORAL
Qty: 30 TABLET | Refills: 0 | Status: SHIPPED | OUTPATIENT
Start: 2024-10-25 | End: 2024-11-06

## 2024-10-25 RX ORDER — AMIODARONE HYDROCHLORIDE 200 MG/1
200 TABLET ORAL 2 TIMES DAILY
Status: DISCONTINUED | OUTPATIENT
Start: 2024-10-25 | End: 2024-10-28 | Stop reason: HOSPADM

## 2024-10-25 RX ORDER — INSULIN ASPART INJECTION 100 [IU]/ML
12 INJECTION, SOLUTION SUBCUTANEOUS
Qty: 9 ML | Refills: 1 | Status: SHIPPED | OUTPATIENT
Start: 2024-10-25

## 2024-10-25 RX ORDER — LEVOFLOXACIN 250 MG/1
250 TABLET, FILM COATED ORAL DAILY
Qty: 3 TABLET | Refills: 0 | Status: SHIPPED | OUTPATIENT
Start: 2024-10-26 | End: 2024-10-29

## 2024-10-25 RX ORDER — GUAIFENESIN 400 MG/1
400 TABLET ORAL 4 TIMES DAILY PRN
Qty: 56 TABLET | Refills: 0 | Status: SHIPPED | OUTPATIENT
Start: 2024-10-25

## 2024-10-25 RX ORDER — METOPROLOL TARTRATE 1 MG/ML
5 INJECTION, SOLUTION INTRAVENOUS ONCE
Status: COMPLETED | OUTPATIENT
Start: 2024-10-25 | End: 2024-10-25

## 2024-10-25 RX ORDER — INSULIN GLARGINE 100 [IU]/ML
26 INJECTION, SOLUTION SUBCUTANEOUS EVERY MORNING
Qty: 18 EACH | Refills: 3 | Status: SHIPPED | OUTPATIENT
Start: 2024-10-25

## 2024-10-25 RX ORDER — MIDODRINE HYDROCHLORIDE 10 MG/1
10 TABLET ORAL 3 TIMES DAILY
Qty: 90 TABLET | Refills: 2 | Status: SHIPPED | OUTPATIENT
Start: 2024-10-25

## 2024-10-25 RX ORDER — WATER 10 ML/10ML
INJECTION INTRAMUSCULAR; INTRAVENOUS; SUBCUTANEOUS
Status: COMPLETED
Start: 2024-10-25 | End: 2024-10-25

## 2024-10-25 RX ADMIN — HEPARIN SODIUM 5000 UNITS: 5000 INJECTION INTRAVENOUS; SUBCUTANEOUS at 02:22

## 2024-10-25 RX ADMIN — INSULIN LISPRO 12 UNITS: 100 INJECTION, SOLUTION INTRAVENOUS; SUBCUTANEOUS at 06:56

## 2024-10-25 RX ADMIN — CALCIUM ACETATE 1334 MG: 667 CAPSULE ORAL at 11:58

## 2024-10-25 RX ADMIN — PREDNISONE 40 MG: 20 TABLET ORAL at 13:52

## 2024-10-25 RX ADMIN — IPRATROPIUM BROMIDE AND ALBUTEROL SULFATE 1 DOSE: 2.5; .5 SOLUTION RESPIRATORY (INHALATION) at 16:19

## 2024-10-25 RX ADMIN — GUAIFENESIN 400 MG: 400 TABLET ORAL at 11:58

## 2024-10-25 RX ADMIN — METHYLPREDNISOLONE SODIUM SUCCINATE 40 MG: 40 INJECTION INTRAMUSCULAR; INTRAVENOUS at 08:00

## 2024-10-25 RX ADMIN — POLYETHYLENE GLYCOL 3350 17 G: 17 POWDER, FOR SOLUTION ORAL at 10:30

## 2024-10-25 RX ADMIN — SODIUM CHLORIDE, PRESERVATIVE FREE 10 ML: 5 INJECTION INTRAVENOUS at 08:00

## 2024-10-25 RX ADMIN — ARFORMOTEROL TARTRATE 15 MCG: 15 SOLUTION RESPIRATORY (INHALATION) at 08:54

## 2024-10-25 RX ADMIN — CALCIUM ACETATE 1334 MG: 667 CAPSULE ORAL at 17:00

## 2024-10-25 RX ADMIN — MICONAZOLE NITRATE: 2 OINTMENT TOPICAL at 12:01

## 2024-10-25 RX ADMIN — WATER: 1 INJECTION INTRAMUSCULAR; INTRAVENOUS; SUBCUTANEOUS at 02:15

## 2024-10-25 RX ADMIN — METOPROLOL TARTRATE 5 MG: 5 INJECTION INTRAVENOUS at 15:14

## 2024-10-25 RX ADMIN — MICONAZOLE NITRATE: 20 POWDER TOPICAL at 08:00

## 2024-10-25 RX ADMIN — ASPIRIN 81 MG CHEWABLE TABLET 81 MG: 81 TABLET CHEWABLE at 08:00

## 2024-10-25 RX ADMIN — AMIODARONE HYDROCHLORIDE 200 MG: 200 TABLET ORAL at 21:00

## 2024-10-25 RX ADMIN — IPRATROPIUM BROMIDE AND ALBUTEROL SULFATE 1 DOSE: 2.5; .5 SOLUTION RESPIRATORY (INHALATION) at 08:54

## 2024-10-25 RX ADMIN — INSULIN GLARGINE 12 UNITS: 100 INJECTION, SOLUTION SUBCUTANEOUS at 08:03

## 2024-10-25 RX ADMIN — ATORVASTATIN CALCIUM 40 MG: 40 TABLET, FILM COATED ORAL at 08:00

## 2024-10-25 RX ADMIN — INSULIN LISPRO 12 UNITS: 100 INJECTION, SOLUTION INTRAVENOUS; SUBCUTANEOUS at 16:59

## 2024-10-25 RX ADMIN — INSULIN LISPRO 12 UNITS: 100 INJECTION, SOLUTION INTRAVENOUS; SUBCUTANEOUS at 11:58

## 2024-10-25 RX ADMIN — ARFORMOTEROL TARTRATE 15 MCG: 15 SOLUTION RESPIRATORY (INHALATION) at 20:21

## 2024-10-25 RX ADMIN — GUAIFENESIN 400 MG: 400 TABLET ORAL at 02:22

## 2024-10-25 RX ADMIN — APIXABAN 5 MG: 5 TABLET, FILM COATED ORAL at 21:00

## 2024-10-25 RX ADMIN — IPRATROPIUM BROMIDE AND ALBUTEROL SULFATE 1 DOSE: 2.5; .5 SOLUTION RESPIRATORY (INHALATION) at 13:37

## 2024-10-25 RX ADMIN — CALCIUM ACETATE 1334 MG: 667 CAPSULE ORAL at 08:00

## 2024-10-25 RX ADMIN — METHYLPREDNISOLONE SODIUM SUCCINATE 40 MG: 40 INJECTION INTRAMUSCULAR; INTRAVENOUS at 02:23

## 2024-10-25 RX ADMIN — SODIUM CHLORIDE, PRESERVATIVE FREE 10 ML: 5 INJECTION INTRAVENOUS at 21:00

## 2024-10-25 RX ADMIN — HEPARIN SODIUM 5000 UNITS: 5000 INJECTION INTRAVENOUS; SUBCUTANEOUS at 11:58

## 2024-10-25 RX ADMIN — MIDODRINE HYDROCHLORIDE 10 MG: 5 TABLET ORAL at 14:24

## 2024-10-25 RX ADMIN — LEVOFLOXACIN 250 MG: 250 TABLET, FILM COATED ORAL at 08:00

## 2024-10-25 RX ADMIN — GUAIFENESIN 400 MG: 400 TABLET ORAL at 18:42

## 2024-10-25 RX ADMIN — IPRATROPIUM BROMIDE AND ALBUTEROL SULFATE 1 DOSE: 2.5; .5 SOLUTION RESPIRATORY (INHALATION) at 20:21

## 2024-10-25 ASSESSMENT — PAIN SCALES - GENERAL: PAINLEVEL_OUTOF10: 0

## 2024-10-25 NOTE — ACP (ADVANCE CARE PLANNING)
10/25/2024  Advance Care Planning   Healthcare Decision Maker:    Primary Decision Maker: PaddyMeghann mendenhall Kota Micky - [phone]    Secondary Decision Maker: Wang Murray - [phone]    Click here to complete Healthcare Decision Makers including selection of the Healthcare Decision Maker Relationship (ie \"Primary\").

## 2024-10-25 NOTE — CARE COORDINATION
IMM letter given and signed by Pt. Electronically signed by HILDA Guevara on 10/25/2024 at 1:15 PM

## 2024-10-25 NOTE — PLAN OF CARE
Problem: Chronic Conditions and Co-morbidities  Goal: Patient's chronic conditions and co-morbidity symptoms are monitored and maintained or improved  10/25/2024 1308 by Maryana Haynes RN  Outcome: Adequate for Discharge     Problem: Discharge Planning  Goal: Discharge to home or other facility with appropriate resources  10/25/2024 1308 by Maryana Haynes RN  Outcome: Adequate for Discharge     Problem: Safety - Adult  Goal: Free from fall injury  10/25/2024 1308 by Maryana Haynes RN  Outcome: Adequate for Discharge

## 2024-10-25 NOTE — DISCHARGE INSTRUCTIONS
Change your Basaglar to 26 units daily and start using Insulin Aspart 12 units three times daily, please check your sugars 3 times a day. Take Levofloxacin daily for another 3 days. Take Prednisone taper as prescribed. Start using Dulera inhaler twice a day moving forward. Use Guaifenesin up to 4 times a day as needed for cough. Change your Midodrine to 3 times daily scheduled.    Please make sure to follow up with your primary care doctor.

## 2024-10-25 NOTE — CARE COORDINATION
10/25/2024  Social Work Discharge Planning: Plan is home with her daughter at discharge. Pt declined HHC. Pt is on 4l o2 here and uses 2l via [facility] at home. Wean o2. Pulse ox testing is needed. If unable to wean then a new order will need to be faxed to [facility].  Pt goes to dialysis at [phone] t,th,sat at 5:30am. Call them when Pt discharges. Daughter will transport at discharge. Pt has a ww. Electronically signed by HILDA Guevara on 10/25/2024 at 8:50 AM

## 2024-10-26 LAB
ALBUMIN SERPL-MCNC: 3.9 G/DL (ref 3.5–5.2)
ALP SERPL-CCNC: 140 U/L (ref 35–104)
ALT SERPL-CCNC: 20 U/L (ref 0–32)
ANION GAP SERPL CALCULATED.3IONS-SCNC: 18 MMOL/L (ref 7–16)
AST SERPL-CCNC: 26 U/L (ref 0–31)
BASOPHILS # BLD: 0.01 K/UL (ref 0–0.2)
BASOPHILS NFR BLD: 0 % (ref 0–2)
BILIRUB SERPL-MCNC: 0.3 MG/DL (ref 0–1.2)
BUN SERPL-MCNC: 59 MG/DL (ref 6–23)
CALCIUM SERPL-MCNC: 9.2 MG/DL (ref 8.6–10.2)
CHLORIDE SERPL-SCNC: 87 MMOL/L (ref 98–107)
CO2 SERPL-SCNC: 25 MMOL/L (ref 22–29)
CREAT SERPL-MCNC: 5.6 MG/DL (ref 0.5–1)
EKG ATRIAL RATE: 234 BPM
EKG Q-T INTERVAL: 348 MS
EKG QRS DURATION: 96 MS
EKG QTC CALCULATION (BAZETT): 504 MS
EKG R AXIS: -27 DEGREES
EKG T AXIS: 160 DEGREES
EKG VENTRICULAR RATE: 126 BPM
EOSINOPHIL # BLD: 0 K/UL (ref 0.05–0.5)
EOSINOPHILS RELATIVE PERCENT: 0 % (ref 0–6)
ERYTHROCYTE [DISTWIDTH] IN BLOOD BY AUTOMATED COUNT: 15.9 % (ref 11.5–15)
GFR, ESTIMATED: 8 ML/MIN/1.73M2
GLUCOSE BLD-MCNC: 188 MG/DL (ref 74–99)
GLUCOSE BLD-MCNC: 264 MG/DL (ref 74–99)
GLUCOSE BLD-MCNC: 292 MG/DL (ref 74–99)
GLUCOSE BLD-MCNC: 336 MG/DL (ref 74–99)
GLUCOSE BLD-MCNC: 405 MG/DL (ref 74–99)
GLUCOSE BLD-MCNC: >500 MG/DL (ref 74–99)
GLUCOSE SERPL-MCNC: 485 MG/DL (ref 74–99)
GLUCOSE SERPL-MCNC: 536 MG/DL (ref 74–99)
HCT VFR BLD AUTO: 35.5 % (ref 34–48)
HGB BLD-MCNC: 11 G/DL (ref 11.5–15.5)
IMM GRANULOCYTES # BLD AUTO: 0.11 K/UL (ref 0–0.58)
IMM GRANULOCYTES NFR BLD: 1 % (ref 0–5)
LYMPHOCYTES NFR BLD: 0.35 K/UL (ref 1.5–4)
LYMPHOCYTES RELATIVE PERCENT: 3 % (ref 20–42)
MCH RBC QN AUTO: 31.9 PG (ref 26–35)
MCHC RBC AUTO-ENTMCNC: 31 G/DL (ref 32–34.5)
MCV RBC AUTO: 102.9 FL (ref 80–99.9)
MICROORGANISM SPEC CULT: NORMAL
MONOCYTES NFR BLD: 0.51 K/UL (ref 0.1–0.95)
MONOCYTES NFR BLD: 4 % (ref 2–12)
NEUTROPHILS NFR BLD: 92 % (ref 43–80)
NEUTS SEG NFR BLD: 10.65 K/UL (ref 1.8–7.3)
PLATELET # BLD AUTO: 124 K/UL (ref 130–450)
PMV BLD AUTO: 11.1 FL (ref 7–12)
POTASSIUM SERPL-SCNC: 5.5 MMOL/L (ref 3.5–5)
PROT SERPL-MCNC: 6.7 G/DL (ref 6.4–8.3)
RBC # BLD AUTO: 3.45 M/UL (ref 3.5–5.5)
RBC # BLD: ABNORMAL 10*6/UL
SODIUM SERPL-SCNC: 130 MMOL/L (ref 132–146)
SPECIMEN DESCRIPTION: NORMAL
WBC OTHER # BLD: 11.6 K/UL (ref 4.5–11.5)

## 2024-10-26 PROCEDURE — 1200000000 HC SEMI PRIVATE

## 2024-10-26 PROCEDURE — 6370000000 HC RX 637 (ALT 250 FOR IP): Performed by: INTERNAL MEDICINE

## 2024-10-26 PROCEDURE — 80053 COMPREHEN METABOLIC PANEL: CPT

## 2024-10-26 PROCEDURE — 90935 HEMODIALYSIS ONE EVALUATION: CPT

## 2024-10-26 PROCEDURE — 6370000000 HC RX 637 (ALT 250 FOR IP): Performed by: STUDENT IN AN ORGANIZED HEALTH CARE EDUCATION/TRAINING PROGRAM

## 2024-10-26 PROCEDURE — 99233 SBSQ HOSP IP/OBS HIGH 50: CPT | Performed by: STUDENT IN AN ORGANIZED HEALTH CARE EDUCATION/TRAINING PROGRAM

## 2024-10-26 PROCEDURE — 94640 AIRWAY INHALATION TREATMENT: CPT

## 2024-10-26 PROCEDURE — 2500000003 HC RX 250 WO HCPCS: Performed by: INTERNAL MEDICINE

## 2024-10-26 PROCEDURE — 2580000003 HC RX 258: Performed by: INTERNAL MEDICINE

## 2024-10-26 PROCEDURE — 6360000002 HC RX W HCPCS: Performed by: INTERNAL MEDICINE

## 2024-10-26 PROCEDURE — 2700000000 HC OXYGEN THERAPY PER DAY

## 2024-10-26 PROCEDURE — 6370000000 HC RX 637 (ALT 250 FOR IP): Performed by: NURSE PRACTITIONER

## 2024-10-26 PROCEDURE — 85025 COMPLETE CBC W/AUTO DIFF WBC: CPT

## 2024-10-26 PROCEDURE — 82962 GLUCOSE BLOOD TEST: CPT

## 2024-10-26 PROCEDURE — 82947 ASSAY GLUCOSE BLOOD QUANT: CPT

## 2024-10-26 RX ORDER — INSULIN LISPRO 100 [IU]/ML
5 INJECTION, SOLUTION INTRAVENOUS; SUBCUTANEOUS ONCE
Status: COMPLETED | OUTPATIENT
Start: 2024-10-26 | End: 2024-10-26

## 2024-10-26 RX ORDER — INSULIN GLARGINE 100 [IU]/ML
26 INJECTION, SOLUTION SUBCUTANEOUS EVERY MORNING
Status: DISCONTINUED | OUTPATIENT
Start: 2024-10-27 | End: 2024-10-27

## 2024-10-26 RX ORDER — INSULIN GLARGINE 100 [IU]/ML
14 INJECTION, SOLUTION SUBCUTANEOUS ONCE
Status: COMPLETED | OUTPATIENT
Start: 2024-10-26 | End: 2024-10-26

## 2024-10-26 RX ORDER — INSULIN LISPRO 100 [IU]/ML
0-8 INJECTION, SOLUTION INTRAVENOUS; SUBCUTANEOUS
Status: DISCONTINUED | OUTPATIENT
Start: 2024-10-26 | End: 2024-10-28 | Stop reason: HOSPADM

## 2024-10-26 RX ADMIN — PREDNISONE 40 MG: 20 TABLET ORAL at 08:11

## 2024-10-26 RX ADMIN — APIXABAN 5 MG: 5 TABLET, FILM COATED ORAL at 08:10

## 2024-10-26 RX ADMIN — INSULIN LISPRO 2 UNITS: 100 INJECTION, SOLUTION INTRAVENOUS; SUBCUTANEOUS at 13:56

## 2024-10-26 RX ADMIN — SODIUM CHLORIDE, PRESERVATIVE FREE 10 ML: 5 INJECTION INTRAVENOUS at 08:11

## 2024-10-26 RX ADMIN — INSULIN LISPRO 12 UNITS: 100 INJECTION, SOLUTION INTRAVENOUS; SUBCUTANEOUS at 13:55

## 2024-10-26 RX ADMIN — GUAIFENESIN 400 MG: 400 TABLET ORAL at 20:26

## 2024-10-26 RX ADMIN — ARFORMOTEROL TARTRATE 15 MCG: 15 SOLUTION RESPIRATORY (INHALATION) at 20:49

## 2024-10-26 RX ADMIN — INSULIN LISPRO 12 UNITS: 100 INJECTION, SOLUTION INTRAVENOUS; SUBCUTANEOUS at 06:10

## 2024-10-26 RX ADMIN — LEVOFLOXACIN 250 MG: 250 TABLET, FILM COATED ORAL at 08:10

## 2024-10-26 RX ADMIN — INSULIN GLARGINE 14 UNITS: 100 INJECTION, SOLUTION SUBCUTANEOUS at 14:05

## 2024-10-26 RX ADMIN — INSULIN GLARGINE 12 UNITS: 100 INJECTION, SOLUTION SUBCUTANEOUS at 08:10

## 2024-10-26 RX ADMIN — GUAIFENESIN 400 MG: 400 TABLET ORAL at 02:05

## 2024-10-26 RX ADMIN — SODIUM CHLORIDE, PRESERVATIVE FREE 10 ML: 5 INJECTION INTRAVENOUS at 20:26

## 2024-10-26 RX ADMIN — MICONAZOLE NITRATE: 2 OINTMENT TOPICAL at 20:27

## 2024-10-26 RX ADMIN — APIXABAN 5 MG: 5 TABLET, FILM COATED ORAL at 20:26

## 2024-10-26 RX ADMIN — CALCIUM ACETATE 1334 MG: 667 CAPSULE ORAL at 13:52

## 2024-10-26 RX ADMIN — INSULIN LISPRO 5 UNITS: 100 INJECTION, SOLUTION INTRAVENOUS; SUBCUTANEOUS at 06:50

## 2024-10-26 RX ADMIN — INSULIN LISPRO 4 UNITS: 100 INJECTION, SOLUTION INTRAVENOUS; SUBCUTANEOUS at 16:57

## 2024-10-26 RX ADMIN — INSULIN LISPRO 4 UNITS: 100 INJECTION, SOLUTION INTRAVENOUS; SUBCUTANEOUS at 20:31

## 2024-10-26 RX ADMIN — ACETAMINOPHEN 650 MG: 325 TABLET ORAL at 02:05

## 2024-10-26 RX ADMIN — INSULIN LISPRO 12 UNITS: 100 INJECTION, SOLUTION INTRAVENOUS; SUBCUTANEOUS at 16:56

## 2024-10-26 RX ADMIN — ATORVASTATIN CALCIUM 40 MG: 40 TABLET, FILM COATED ORAL at 08:10

## 2024-10-26 RX ADMIN — IPRATROPIUM BROMIDE AND ALBUTEROL SULFATE 1 DOSE: 2.5; .5 SOLUTION RESPIRATORY (INHALATION) at 20:49

## 2024-10-26 RX ADMIN — AMIODARONE HYDROCHLORIDE 200 MG: 200 TABLET ORAL at 08:10

## 2024-10-26 RX ADMIN — ASPIRIN 81 MG CHEWABLE TABLET 81 MG: 81 TABLET CHEWABLE at 08:10

## 2024-10-26 RX ADMIN — CALCIUM ACETATE 1334 MG: 667 CAPSULE ORAL at 16:56

## 2024-10-26 RX ADMIN — AMIODARONE HYDROCHLORIDE 200 MG: 200 TABLET ORAL at 20:26

## 2024-10-26 RX ADMIN — IPRATROPIUM BROMIDE AND ALBUTEROL SULFATE 1 DOSE: 2.5; .5 SOLUTION RESPIRATORY (INHALATION) at 15:56

## 2024-10-26 RX ADMIN — MIDODRINE HYDROCHLORIDE 10 MG: 5 TABLET ORAL at 13:52

## 2024-10-26 RX ADMIN — CALCIUM ACETATE 1334 MG: 667 CAPSULE ORAL at 08:10

## 2024-10-26 ASSESSMENT — PAIN DESCRIPTION - DESCRIPTORS
DESCRIPTORS: DULL
DESCRIPTORS: ACHING;DULL

## 2024-10-26 ASSESSMENT — PAIN DESCRIPTION - ORIENTATION: ORIENTATION: LOWER;MID

## 2024-10-26 ASSESSMENT — PAIN SCALES - GENERAL
PAINLEVEL_OUTOF10: 3
PAINLEVEL_OUTOF10: 0
PAINLEVEL_OUTOF10: 3

## 2024-10-26 ASSESSMENT — PAIN DESCRIPTION - LOCATION
LOCATION: BACK
LOCATION: BACK

## 2024-10-26 NOTE — CONSULTS
CARDIOLOGY CONSULTATION    Patient Name:  Lizette Murray    :  1955    Reason for Consultation:   New onset atrial fibrillation    History of Present Illness:   Lizette Murray presents to [facility] following history of right shoulder and right upper chest discomfort she was subsequently diagnosed with a rhinovirus infection and in addition to receiving dialysis was treated as same.  No chest discomfort seem to akash following admission.  She was to be discharged on 10/25/2024 when she developed a sudden onset of atrial fibrillation for which she has no previous history.  Of importance is that she has hemodynamically severe aortic stenosis and was due to be seen in follow-up as an outpatient to determine where she wished to proceed for aortic valve replacement when the symptoms suddenly occurred.  Her history dates back to approximately  when she sustained a non-STEMI and underwent successful coronary artery intervention with a KARMA to her LAD but unfortunately had diagonal ostium jailed with >70% stenosis.  Nonetheless she has fared well and followed with her cardiologist in [facility].  She was then hospitalized in September of this year for an underlying bacterial infection and was stable from a cardiac standpoint despite the presence as per echo of severe calcific aortic valve stenosis and underlying cardiomyopathy with a left ventricular ejection fraction estimated at 38%.  Her mean aortic valve gradient was 49 mmHg.  I have been asked to see her presently for atrial fibrillation and underlying valvular disease.  In addition to the above she has end-stage renal disease and has been on dialysis for several years.  Her major symptoms at this time are shortness of breath with exertion and a singular episode of right shoulder and right upper chest discomfort leading up to admission.  From a cardiac status she also has a history of previous pulmonary embolism and systolic

## 2024-10-26 NOTE — PLAN OF CARE
Problem: ABCDS Injury Assessment  Goal: Absence of physical injury  10/26/2024 1113 by Liliya Cleaning RN  Outcome: Progressing  10/26/2024 0308 by Tess Soriano RN  Outcome: Progressing     Problem: Chronic Conditions and Co-morbidities  Goal: Patient's chronic conditions and co-morbidity symptoms are monitored and maintained or improved  10/26/2024 1113 by Liliya Cleaning RN  Outcome: Progressing  10/26/2024 0308 by Tess Soriano RN  Outcome: Progressing     Problem: Skin/Tissue Integrity  Goal: Absence of new skin breakdown  Description: 1.  Monitor for areas of redness and/or skin breakdown  2.  Assess vascular access sites hourly  3.  Every 4-6 hours minimum:  Change oxygen saturation probe site  4.  Every 4-6 hours:  If on nasal continuous positive airway pressure, respiratory therapy assess nares and determine need for appliance change or resting period.  10/26/2024 1113 by Liliya Cleaning RN  Outcome: Progressing  10/26/2024 0308 by Tess Soriano RN  Outcome: Progressing

## 2024-10-26 NOTE — FLOWSHEET NOTE
10/26/24 1315   Vital Signs   BP (!) 111/50   Temp 98.2 °F (36.8 °C)   Pulse 78   Respirations 18   Weight - Scale 115.5 kg (254 lb 10.1 oz)   Weight Method Stated   Percent Weight Change -1.45   Pain Assessment   Pain Assessment None - Denies Pain   Pain Level 0   Hemodialysis Fistula/Graft Superior Arm   No placement date or time found.   Present on Admission/Arrival: Yes  Orientation: Superior  Access Location: Arm  Description (optional): LEFT   Site Assessment Clean, dry & intact   Thrill Present   Bruit Present   Post-Hemodialysis Assessment   Post-Treatment Procedures Blood returned;Access bleeding time < 10 minutes   Machine Disinfection Process Exterior Machine Disinfection   Rinseback Volume (ml) 300 ml   Blood Volume Processed (Liters) 93.4 L   Dialyzer Clearance Moderately streaked   Duration of Treatment (minutes) 225 minutes   Heparin Amount Administered During Treatment (mL) 0 mL   Hemodialysis Intake (ml) 300 ml   Hemodialysis Output (ml) 2000 ml   NET Removed (ml) 1700   Tolerated Treatment Good   Patient Response to Treatment pt tolerated tx well 1700mL removed stasis achieved bruit/thrill noted nurse to nurse report given to floor RN pt/vitals stable upon d/c dialysis RN   Patient Disposition Return to room

## 2024-10-27 LAB
ALBUMIN SERPL-MCNC: 4.1 G/DL (ref 3.5–5.2)
ALP SERPL-CCNC: 133 U/L (ref 35–104)
ALT SERPL-CCNC: 21 U/L (ref 0–32)
ANION GAP SERPL CALCULATED.3IONS-SCNC: 15 MMOL/L (ref 7–16)
AST SERPL-CCNC: 29 U/L (ref 0–31)
BASOPHILS # BLD: 0.05 K/UL (ref 0–0.2)
BASOPHILS NFR BLD: 0 % (ref 0–2)
BILIRUB SERPL-MCNC: 0.4 MG/DL (ref 0–1.2)
BUN SERPL-MCNC: 41 MG/DL (ref 6–23)
CALCIUM SERPL-MCNC: 9.4 MG/DL (ref 8.6–10.2)
CHLORIDE SERPL-SCNC: 94 MMOL/L (ref 98–107)
CO2 SERPL-SCNC: 25 MMOL/L (ref 22–29)
CREAT SERPL-MCNC: 4 MG/DL (ref 0.5–1)
EOSINOPHIL # BLD: 0 K/UL (ref 0.05–0.5)
EOSINOPHILS RELATIVE PERCENT: 0 % (ref 0–6)
ERYTHROCYTE [DISTWIDTH] IN BLOOD BY AUTOMATED COUNT: 16 % (ref 11.5–15)
GFR, ESTIMATED: 11 ML/MIN/1.73M2
GLUCOSE BLD-MCNC: 199 MG/DL (ref 74–99)
GLUCOSE BLD-MCNC: 214 MG/DL (ref 74–99)
GLUCOSE BLD-MCNC: 236 MG/DL (ref 74–99)
GLUCOSE BLD-MCNC: 88 MG/DL (ref 74–99)
GLUCOSE BLD-MCNC: 93 MG/DL (ref 74–99)
GLUCOSE SERPL-MCNC: 190 MG/DL (ref 74–99)
HCT VFR BLD AUTO: 38.1 % (ref 34–48)
HGB BLD-MCNC: 11.6 G/DL (ref 11.5–15.5)
IMM GRANULOCYTES # BLD AUTO: 0.29 K/UL (ref 0–0.58)
IMM GRANULOCYTES NFR BLD: 2 % (ref 0–5)
LYMPHOCYTES NFR BLD: 0.98 K/UL (ref 1.5–4)
LYMPHOCYTES RELATIVE PERCENT: 7 % (ref 20–42)
MCH RBC QN AUTO: 31.9 PG (ref 26–35)
MCHC RBC AUTO-ENTMCNC: 30.4 G/DL (ref 32–34.5)
MCV RBC AUTO: 104.7 FL (ref 80–99.9)
MONOCYTES NFR BLD: 0.9 K/UL (ref 0.1–0.95)
MONOCYTES NFR BLD: 6 % (ref 2–12)
NEUTROPHILS NFR BLD: 84 % (ref 43–80)
NEUTS SEG NFR BLD: 11.99 K/UL (ref 1.8–7.3)
PLATELET, FLUORESCENCE: 134 K/UL (ref 130–450)
PMV BLD AUTO: 11.4 FL (ref 7–12)
POTASSIUM SERPL-SCNC: 4.9 MMOL/L (ref 3.5–5)
PROT SERPL-MCNC: 6.8 G/DL (ref 6.4–8.3)
RBC # BLD AUTO: 3.64 M/UL (ref 3.5–5.5)
SODIUM SERPL-SCNC: 134 MMOL/L (ref 132–146)
WBC OTHER # BLD: 14.2 K/UL (ref 4.5–11.5)

## 2024-10-27 PROCEDURE — 2500000003 HC RX 250 WO HCPCS: Performed by: INTERNAL MEDICINE

## 2024-10-27 PROCEDURE — 85025 COMPLETE CBC W/AUTO DIFF WBC: CPT

## 2024-10-27 PROCEDURE — 1200000000 HC SEMI PRIVATE

## 2024-10-27 PROCEDURE — 2580000003 HC RX 258: Performed by: INTERNAL MEDICINE

## 2024-10-27 PROCEDURE — 6370000000 HC RX 637 (ALT 250 FOR IP): Performed by: INTERNAL MEDICINE

## 2024-10-27 PROCEDURE — 82962 GLUCOSE BLOOD TEST: CPT

## 2024-10-27 PROCEDURE — 6370000000 HC RX 637 (ALT 250 FOR IP): Performed by: NURSE PRACTITIONER

## 2024-10-27 PROCEDURE — 6360000002 HC RX W HCPCS: Performed by: INTERNAL MEDICINE

## 2024-10-27 PROCEDURE — 6370000000 HC RX 637 (ALT 250 FOR IP): Performed by: STUDENT IN AN ORGANIZED HEALTH CARE EDUCATION/TRAINING PROGRAM

## 2024-10-27 PROCEDURE — 80053 COMPREHEN METABOLIC PANEL: CPT

## 2024-10-27 PROCEDURE — 94640 AIRWAY INHALATION TREATMENT: CPT

## 2024-10-27 PROCEDURE — 99233 SBSQ HOSP IP/OBS HIGH 50: CPT | Performed by: STUDENT IN AN ORGANIZED HEALTH CARE EDUCATION/TRAINING PROGRAM

## 2024-10-27 PROCEDURE — 2700000000 HC OXYGEN THERAPY PER DAY

## 2024-10-27 RX ORDER — CALCIUM CARBONATE 500 MG/1
500 TABLET, CHEWABLE ORAL 3 TIMES DAILY PRN
Status: DISCONTINUED | OUTPATIENT
Start: 2024-10-27 | End: 2024-10-28 | Stop reason: HOSPADM

## 2024-10-27 RX ORDER — GLUCAGON 1 MG/ML
1 KIT INJECTION PRN
Status: DISCONTINUED | OUTPATIENT
Start: 2024-10-27 | End: 2024-10-28 | Stop reason: HOSPADM

## 2024-10-27 RX ORDER — INSULIN LISPRO 100 [IU]/ML
8 INJECTION, SOLUTION INTRAVENOUS; SUBCUTANEOUS
Status: DISCONTINUED | OUTPATIENT
Start: 2024-10-27 | End: 2024-10-28 | Stop reason: HOSPADM

## 2024-10-27 RX ORDER — INSULIN LISPRO 100 [IU]/ML
4 INJECTION, SOLUTION INTRAVENOUS; SUBCUTANEOUS ONCE
Status: COMPLETED | OUTPATIENT
Start: 2024-10-27 | End: 2024-10-27

## 2024-10-27 RX ORDER — PREDNISONE 5 MG/1
10 TABLET ORAL DAILY
Status: DISCONTINUED | OUTPATIENT
Start: 2024-11-03 | End: 2024-10-28 | Stop reason: HOSPADM

## 2024-10-27 RX ORDER — OXYCODONE HYDROCHLORIDE 5 MG/1
5 TABLET ORAL ONCE
Status: COMPLETED | OUTPATIENT
Start: 2024-10-27 | End: 2024-10-27

## 2024-10-27 RX ORDER — INSULIN GLARGINE 100 [IU]/ML
18 INJECTION, SOLUTION SUBCUTANEOUS EVERY MORNING
Status: DISCONTINUED | OUTPATIENT
Start: 2024-10-28 | End: 2024-10-28 | Stop reason: HOSPADM

## 2024-10-27 RX ORDER — DEXTROSE MONOHYDRATE 100 MG/ML
INJECTION, SOLUTION INTRAVENOUS CONTINUOUS PRN
Status: DISCONTINUED | OUTPATIENT
Start: 2024-10-27 | End: 2024-10-28 | Stop reason: HOSPADM

## 2024-10-27 RX ORDER — PREDNISONE 20 MG/1
20 TABLET ORAL DAILY
Status: DISCONTINUED | OUTPATIENT
Start: 2024-10-31 | End: 2024-10-28 | Stop reason: HOSPADM

## 2024-10-27 RX ORDER — HYDROXYZINE PAMOATE 25 MG/1
25 CAPSULE ORAL EVERY 8 HOURS PRN
Status: DISCONTINUED | OUTPATIENT
Start: 2024-10-27 | End: 2024-10-28 | Stop reason: HOSPADM

## 2024-10-27 RX ADMIN — AMIODARONE HYDROCHLORIDE 200 MG: 200 TABLET ORAL at 20:42

## 2024-10-27 RX ADMIN — OXYCODONE 5 MG: 5 TABLET ORAL at 03:50

## 2024-10-27 RX ADMIN — LEVOFLOXACIN 250 MG: 250 TABLET, FILM COATED ORAL at 08:23

## 2024-10-27 RX ADMIN — ACETAMINOPHEN 650 MG: 325 TABLET ORAL at 01:04

## 2024-10-27 RX ADMIN — PREDNISONE 40 MG: 20 TABLET ORAL at 08:24

## 2024-10-27 RX ADMIN — CALCIUM ACETATE 1334 MG: 667 CAPSULE ORAL at 16:32

## 2024-10-27 RX ADMIN — IPRATROPIUM BROMIDE AND ALBUTEROL SULFATE 1 DOSE: 2.5; .5 SOLUTION RESPIRATORY (INHALATION) at 17:10

## 2024-10-27 RX ADMIN — PSYLLIUM HUSK 1 PACKET: 3.4 GRANULE ORAL at 20:42

## 2024-10-27 RX ADMIN — INSULIN GLARGINE 26 UNITS: 100 INJECTION, SOLUTION SUBCUTANEOUS at 08:23

## 2024-10-27 RX ADMIN — INSULIN LISPRO 2 UNITS: 100 INJECTION, SOLUTION INTRAVENOUS; SUBCUTANEOUS at 16:31

## 2024-10-27 RX ADMIN — IPRATROPIUM BROMIDE AND ALBUTEROL SULFATE 1 DOSE: 2.5; .5 SOLUTION RESPIRATORY (INHALATION) at 12:57

## 2024-10-27 RX ADMIN — AMIODARONE HYDROCHLORIDE 200 MG: 200 TABLET ORAL at 08:23

## 2024-10-27 RX ADMIN — CALCIUM ACETATE 1334 MG: 667 CAPSULE ORAL at 11:29

## 2024-10-27 RX ADMIN — APIXABAN 5 MG: 5 TABLET, FILM COATED ORAL at 20:43

## 2024-10-27 RX ADMIN — INSULIN LISPRO 8 UNITS: 100 INJECTION, SOLUTION INTRAVENOUS; SUBCUTANEOUS at 16:30

## 2024-10-27 RX ADMIN — INSULIN LISPRO 12 UNITS: 100 INJECTION, SOLUTION INTRAVENOUS; SUBCUTANEOUS at 07:24

## 2024-10-27 RX ADMIN — HYDROXYZINE PAMOATE 25 MG: 25 CAPSULE ORAL at 23:29

## 2024-10-27 RX ADMIN — APIXABAN 5 MG: 5 TABLET, FILM COATED ORAL at 08:23

## 2024-10-27 RX ADMIN — INSULIN LISPRO 2 UNITS: 100 INJECTION, SOLUTION INTRAVENOUS; SUBCUTANEOUS at 07:24

## 2024-10-27 RX ADMIN — INSULIN LISPRO 4 UNITS: 100 INJECTION, SOLUTION INTRAVENOUS; SUBCUTANEOUS at 12:40

## 2024-10-27 RX ADMIN — CALCIUM CARBONATE 500 MG: 500 TABLET, CHEWABLE ORAL at 18:24

## 2024-10-27 RX ADMIN — INSULIN LISPRO 2 UNITS: 100 INJECTION, SOLUTION INTRAVENOUS; SUBCUTANEOUS at 20:51

## 2024-10-27 RX ADMIN — SODIUM CHLORIDE, PRESERVATIVE FREE 10 ML: 5 INJECTION INTRAVENOUS at 08:23

## 2024-10-27 RX ADMIN — ATORVASTATIN CALCIUM 40 MG: 40 TABLET, FILM COATED ORAL at 08:23

## 2024-10-27 RX ADMIN — MICONAZOLE NITRATE: 2 OINTMENT TOPICAL at 20:44

## 2024-10-27 RX ADMIN — ASPIRIN 81 MG CHEWABLE TABLET 81 MG: 81 TABLET CHEWABLE at 08:23

## 2024-10-27 RX ADMIN — IPRATROPIUM BROMIDE AND ALBUTEROL SULFATE 1 DOSE: 2.5; .5 SOLUTION RESPIRATORY (INHALATION) at 21:04

## 2024-10-27 RX ADMIN — SODIUM CHLORIDE, PRESERVATIVE FREE 10 ML: 5 INJECTION INTRAVENOUS at 20:45

## 2024-10-27 RX ADMIN — HYDROXYZINE PAMOATE 25 MG: 25 CAPSULE ORAL at 16:32

## 2024-10-27 RX ADMIN — CALCIUM ACETATE 1334 MG: 667 CAPSULE ORAL at 08:23

## 2024-10-27 RX ADMIN — ARFORMOTEROL TARTRATE 15 MCG: 15 SOLUTION RESPIRATORY (INHALATION) at 21:04

## 2024-10-27 ASSESSMENT — PAIN DESCRIPTION - LOCATION
LOCATION: SHOULDER
LOCATION: SHOULDER
LOCATION: CHEST;SHOULDER
LOCATION: SHOULDER

## 2024-10-27 ASSESSMENT — PAIN DESCRIPTION - ORIENTATION
ORIENTATION: RIGHT

## 2024-10-27 ASSESSMENT — PAIN SCALES - GENERAL
PAINLEVEL_OUTOF10: 8
PAINLEVEL_OUTOF10: 5
PAINLEVEL_OUTOF10: 6
PAINLEVEL_OUTOF10: 3
PAINLEVEL_OUTOF10: 10
PAINLEVEL_OUTOF10: 0

## 2024-10-27 ASSESSMENT — PAIN DESCRIPTION - DESCRIPTORS
DESCRIPTORS: DISCOMFORT;SORE
DESCRIPTORS: PRESSURE;BURNING
DESCRIPTORS: DISCOMFORT;SORE
DESCRIPTORS: DISCOMFORT

## 2024-10-27 ASSESSMENT — PAIN DESCRIPTION - PAIN TYPE: TYPE: ACUTE PAIN

## 2024-10-27 ASSESSMENT — PAIN DESCRIPTION - ONSET: ONSET: PROGRESSIVE

## 2024-10-27 NOTE — PLAN OF CARE
Problem: ABCDS Injury Assessment  Goal: Absence of physical injury  Outcome: Progressing     Problem: Chronic Conditions and Co-morbidities  Goal: Patient's chronic conditions and co-morbidity symptoms are monitored and maintained or improved  Outcome: Progressing     Problem: Skin/Tissue Integrity  Goal: Absence of new skin breakdown  Description: 1.  Monitor for areas of redness and/or skin breakdown  2.  Assess vascular access sites hourly  3.  Every 4-6 hours minimum:  Change oxygen saturation probe site  4.  Every 4-6 hours:  If on nasal continuous positive airway pressure, respiratory therapy assess nares and determine need for appliance change or resting period.  Outcome: Progressing

## 2024-10-27 NOTE — PLAN OF CARE
Problem: ABCDS Injury Assessment  Goal: Absence of physical injury  10/27/2024 1039 by Liliya Cleaning RN  Outcome: Progressing  10/27/2024 0311 by Ellen Tamayo RN  Outcome: Progressing     Problem: Chronic Conditions and Co-morbidities  Goal: Patient's chronic conditions and co-morbidity symptoms are monitored and maintained or improved  10/27/2024 1039 by Liliya Cleaning RN  Outcome: Progressing  10/27/2024 0311 by Ellen Tamayo RN  Outcome: Progressing     Problem: Skin/Tissue Integrity  Goal: Absence of new skin breakdown  Description: 1.  Monitor for areas of redness and/or skin breakdown  2.  Assess vascular access sites hourly  3.  Every 4-6 hours minimum:  Change oxygen saturation probe site  4.  Every 4-6 hours:  If on nasal continuous positive airway pressure, respiratory therapy assess nares and determine need for appliance change or resting period.  10/27/2024 1039 by Liliya Cleaning RN  Outcome: Progressing  10/27/2024 0311 by Ellen Tamayo RN  Outcome: Progressing

## 2024-10-28 ENCOUNTER — HOSPITAL ENCOUNTER (INPATIENT)
Facility: HOSPITAL | Age: 69
End: 2024-10-28
Attending: INTERNAL MEDICINE | Admitting: INTERNAL MEDICINE
Payer: MEDICARE

## 2024-10-28 VITALS
TEMPERATURE: 97.4 F | WEIGHT: 263.2 LBS | RESPIRATION RATE: 18 BRPM | BODY MASS INDEX: 44.94 KG/M2 | DIASTOLIC BLOOD PRESSURE: 65 MMHG | HEART RATE: 110 BPM | OXYGEN SATURATION: 94 % | HEIGHT: 64 IN | SYSTOLIC BLOOD PRESSURE: 110 MMHG

## 2024-10-28 DIAGNOSIS — I35.0 SEVERE AORTIC STENOSIS: Primary | ICD-10-CM

## 2024-10-28 DIAGNOSIS — I35.0 AORTIC STENOSIS: ICD-10-CM

## 2024-10-28 DIAGNOSIS — Z95.2 S/P TAVR (TRANSCATHETER AORTIC VALVE REPLACEMENT): ICD-10-CM

## 2024-10-28 DIAGNOSIS — I48.91 ATRIAL FIBRILLATION WITH NORMAL VENTRICULAR RATE (MULTI): ICD-10-CM

## 2024-10-28 DIAGNOSIS — I72.4 FEMORAL ARTERY PSEUDO-ANEURYSM, LEFT: ICD-10-CM

## 2024-10-28 DIAGNOSIS — I35.0 NONRHEUMATIC AORTIC VALVE STENOSIS: ICD-10-CM

## 2024-10-28 DIAGNOSIS — I44.2 COMPLETE HEART BLOCK: ICD-10-CM

## 2024-10-28 DIAGNOSIS — I70.8 ATHEROSCLEROSIS OF OTHER ARTERIES: ICD-10-CM

## 2024-10-28 DIAGNOSIS — B34.8 RHINOVIRUS: ICD-10-CM

## 2024-10-28 DIAGNOSIS — I21.3 STEMI (ST ELEVATION MYOCARDIAL INFARCTION) (MULTI): ICD-10-CM

## 2024-10-28 DIAGNOSIS — I50.21 ACUTE SYSTOLIC HF (HEART FAILURE): ICD-10-CM

## 2024-10-28 DIAGNOSIS — R06.00 DYSPNEA, UNSPECIFIED: ICD-10-CM

## 2024-10-28 LAB
ALBUMIN SERPL BCP-MCNC: 3.7 G/DL (ref 3.4–5)
ALP SERPL-CCNC: 94 U/L (ref 33–136)
ALT SERPL W P-5'-P-CCNC: 14 U/L (ref 7–45)
ANION GAP SERPL CALC-SCNC: 22 MMOL/L (ref 10–20)
APTT PPP: 27 SECONDS (ref 27–38)
AST SERPL W P-5'-P-CCNC: 22 U/L (ref 9–39)
BASOPHILS # BLD AUTO: 0.06 X10*3/UL (ref 0–0.1)
BASOPHILS NFR BLD AUTO: 0.4 %
BILIRUB SERPL-MCNC: 0.5 MG/DL (ref 0–1.2)
BNP SERPL-MCNC: 1284 PG/ML (ref 0–99)
BUN SERPL-MCNC: 71 MG/DL (ref 6–23)
CALCIUM SERPL-MCNC: 8.5 MG/DL (ref 8.6–10.6)
CHLORIDE SERPL-SCNC: 96 MMOL/L (ref 98–107)
CO2 SERPL-SCNC: 22 MMOL/L (ref 21–32)
CREAT SERPL-MCNC: 6.02 MG/DL (ref 0.5–1.05)
EGFRCR SERPLBLD CKD-EPI 2021: 7 ML/MIN/1.73M*2
EOSINOPHIL # BLD AUTO: 0.02 X10*3/UL (ref 0–0.7)
EOSINOPHIL NFR BLD AUTO: 0.1 %
ERYTHROCYTE [DISTWIDTH] IN BLOOD BY AUTOMATED COUNT: 16 % (ref 11.5–14.5)
GLUCOSE BLD MANUAL STRIP-MCNC: 191 MG/DL (ref 74–99)
GLUCOSE BLD MANUAL STRIP-MCNC: 340 MG/DL (ref 74–99)
GLUCOSE BLD MANUAL STRIP-MCNC: 64 MG/DL (ref 74–99)
GLUCOSE BLD MANUAL STRIP-MCNC: 95 MG/DL (ref 74–99)
GLUCOSE BLD-MCNC: 374 MG/DL (ref 74–99)
GLUCOSE SERPL-MCNC: 93 MG/DL (ref 74–99)
HCT VFR BLD AUTO: 36.1 % (ref 36–46)
HGB BLD-MCNC: 11.2 G/DL (ref 12–16)
IMM GRANULOCYTES # BLD AUTO: 0.41 X10*3/UL (ref 0–0.7)
IMM GRANULOCYTES NFR BLD AUTO: 2.9 % (ref 0–0.9)
INR PPP: 1.6 (ref 0.9–1.1)
LYMPHOCYTES # BLD AUTO: 1.29 X10*3/UL (ref 1.2–4.8)
LYMPHOCYTES NFR BLD AUTO: 9 %
MAGNESIUM SERPL-MCNC: 2.23 MG/DL (ref 1.6–2.4)
MCH RBC QN AUTO: 32.8 PG (ref 26–34)
MCHC RBC AUTO-ENTMCNC: 31 G/DL (ref 32–36)
MCV RBC AUTO: 106 FL (ref 80–100)
MICROORGANISM SPEC CULT: NORMAL
MICROORGANISM SPEC CULT: NORMAL
MONOCYTES # BLD AUTO: 1.09 X10*3/UL (ref 0.1–1)
MONOCYTES NFR BLD AUTO: 7.6 %
NEUTROPHILS # BLD AUTO: 11.46 X10*3/UL (ref 1.2–7.7)
NEUTROPHILS NFR BLD AUTO: 80 %
NRBC BLD-RTO: 2.9 /100 WBCS (ref 0–0)
PLATELET # BLD AUTO: 125 X10*3/UL (ref 150–450)
POTASSIUM SERPL-SCNC: 4.7 MMOL/L (ref 3.5–5.3)
PROT SERPL-MCNC: 6.2 G/DL (ref 6.4–8.2)
PROTHROMBIN TIME: 18.6 SECONDS (ref 9.8–12.8)
RBC # BLD AUTO: 3.41 X10*6/UL (ref 4–5.2)
SERVICE CMNT-IMP: NORMAL
SERVICE CMNT-IMP: NORMAL
SODIUM SERPL-SCNC: 135 MMOL/L (ref 136–145)
SPECIMEN DESCRIPTION: NORMAL
SPECIMEN DESCRIPTION: NORMAL
WBC # BLD AUTO: 14.3 X10*3/UL (ref 4.4–11.3)

## 2024-10-28 PROCEDURE — 6370000000 HC RX 637 (ALT 250 FOR IP): Performed by: STUDENT IN AN ORGANIZED HEALTH CARE EDUCATION/TRAINING PROGRAM

## 2024-10-28 PROCEDURE — 80069 RENAL FUNCTION PANEL: CPT | Mod: CCI

## 2024-10-28 PROCEDURE — 80053 COMPREHEN METABOLIC PANEL: CPT

## 2024-10-28 PROCEDURE — 36415 COLL VENOUS BLD VENIPUNCTURE: CPT

## 2024-10-28 PROCEDURE — 99239 HOSP IP/OBS DSCHRG MGMT >30: CPT | Performed by: STUDENT IN AN ORGANIZED HEALTH CARE EDUCATION/TRAINING PROGRAM

## 2024-10-28 PROCEDURE — 2500000004 HC RX 250 GENERAL PHARMACY W/ HCPCS (ALT 636 FOR OP/ED)

## 2024-10-28 PROCEDURE — 2700000000 HC OXYGEN THERAPY PER DAY

## 2024-10-28 PROCEDURE — 85025 COMPLETE CBC W/AUTO DIFF WBC: CPT

## 2024-10-28 PROCEDURE — 82962 GLUCOSE BLOOD TEST: CPT

## 2024-10-28 PROCEDURE — 1200000002 HC GENERAL ROOM WITH TELEMETRY DAILY

## 2024-10-28 PROCEDURE — 2500000002 HC RX 250 W HCPCS SELF ADMINISTERED DRUGS (ALT 637 FOR MEDICARE OP, ALT 636 FOR OP/ED): Performed by: NURSE PRACTITIONER

## 2024-10-28 PROCEDURE — XXE2X19 MEASUREMENT OF CARDIAC OUTPUT, COMPUTER-AIDED ASSESSMENT, NEW TECHNOLOGY GROUP 9: ICD-10-PCS | Performed by: INTERNAL MEDICINE

## 2024-10-28 PROCEDURE — 85610 PROTHROMBIN TIME: CPT

## 2024-10-28 PROCEDURE — 99223 1ST HOSP IP/OBS HIGH 75: CPT | Performed by: INTERNAL MEDICINE

## 2024-10-28 PROCEDURE — 71046 X-RAY EXAM CHEST 2 VIEWS: CPT | Performed by: RADIOLOGY

## 2024-10-28 PROCEDURE — 2500000005 HC RX 250 GENERAL PHARMACY W/O HCPCS

## 2024-10-28 PROCEDURE — 83880 ASSAY OF NATRIURETIC PEPTIDE: CPT

## 2024-10-28 PROCEDURE — 83735 ASSAY OF MAGNESIUM: CPT

## 2024-10-28 PROCEDURE — 2500000004 HC RX 250 GENERAL PHARMACY W/ HCPCS (ALT 636 FOR OP/ED): Performed by: NURSE PRACTITIONER

## 2024-10-28 PROCEDURE — 82947 ASSAY GLUCOSE BLOOD QUANT: CPT

## 2024-10-28 PROCEDURE — 93010 ELECTROCARDIOGRAM REPORT: CPT | Performed by: INTERNAL MEDICINE

## 2024-10-28 PROCEDURE — 2500000001 HC RX 250 WO HCPCS SELF ADMINISTERED DRUGS (ALT 637 FOR MEDICARE OP): Performed by: NURSE PRACTITIONER

## 2024-10-28 PROCEDURE — 2500000001 HC RX 250 WO HCPCS SELF ADMINISTERED DRUGS (ALT 637 FOR MEDICARE OP)

## 2024-10-28 RX ORDER — GUAIFENESIN 600 MG/1
600 TABLET, EXTENDED RELEASE ORAL 2 TIMES DAILY PRN
Status: DISCONTINUED | OUTPATIENT
Start: 2024-10-28 | End: 2024-11-08

## 2024-10-28 RX ORDER — PREDNISONE 5 MG/1
10 TABLET ORAL DAILY
Status: COMPLETED | OUTPATIENT
Start: 2024-11-02 | End: 2024-11-02

## 2024-10-28 RX ORDER — ATORVASTATIN CALCIUM 40 MG/1
40 TABLET, FILM COATED ORAL EVERY MORNING
COMMUNITY

## 2024-10-28 RX ORDER — INSULIN LISPRO 100 [IU]/ML
0-5 INJECTION, SOLUTION INTRAVENOUS; SUBCUTANEOUS
Status: DISCONTINUED | OUTPATIENT
Start: 2024-10-28 | End: 2024-10-29

## 2024-10-28 RX ORDER — CALCIUM ACETATE 667 MG/1
1334 CAPSULE ORAL
Status: DISCONTINUED | OUTPATIENT
Start: 2024-10-28 | End: 2024-11-08

## 2024-10-28 RX ORDER — MIDODRINE HYDROCHLORIDE 5 MG/1
5 TABLET ORAL 3 TIMES WEEKLY
Status: DISCONTINUED | OUTPATIENT
Start: 2024-10-29 | End: 2024-10-28

## 2024-10-28 RX ORDER — AMOXICILLIN 250 MG
1 CAPSULE ORAL NIGHTLY
Status: DISCONTINUED | OUTPATIENT
Start: 2024-10-28 | End: 2024-11-10 | Stop reason: HOSPADM

## 2024-10-28 RX ORDER — MIDODRINE HYDROCHLORIDE 5 MG/1
TABLET ORAL
COMMUNITY

## 2024-10-28 RX ORDER — INSULIN GLARGINE 100 [IU]/ML
12 INJECTION, SOLUTION SUBCUTANEOUS EVERY MORNING
COMMUNITY

## 2024-10-28 RX ORDER — DEXTROSE 50 % IN WATER (D50W) INTRAVENOUS SYRINGE
12.5
Status: DISCONTINUED | OUTPATIENT
Start: 2024-10-28 | End: 2024-10-29

## 2024-10-28 RX ORDER — POLYETHYLENE GLYCOL 3350 17 G/17G
17 POWDER, FOR SOLUTION ORAL DAILY PRN
Status: DISCONTINUED | OUTPATIENT
Start: 2024-10-28 | End: 2024-11-10 | Stop reason: HOSPADM

## 2024-10-28 RX ORDER — ASPIRIN 81 MG/1
81 TABLET ORAL DAILY
COMMUNITY

## 2024-10-28 RX ORDER — MIDODRINE HYDROCHLORIDE 10 MG/1
5 TABLET ORAL
Status: DISCONTINUED | OUTPATIENT
Start: 2024-10-28 | End: 2024-11-06

## 2024-10-28 RX ORDER — MIDODRINE HYDROCHLORIDE 5 MG/1
5 TABLET ORAL 3 TIMES DAILY PRN
Status: DISCONTINUED | OUTPATIENT
Start: 2024-10-28 | End: 2024-10-28

## 2024-10-28 RX ORDER — PREDNISONE 20 MG/1
40 TABLET ORAL DAILY
Status: COMPLETED | OUTPATIENT
Start: 2024-10-28 | End: 2024-10-30

## 2024-10-28 RX ORDER — ONDANSETRON HYDROCHLORIDE 2 MG/ML
4 INJECTION, SOLUTION INTRAVENOUS EVERY 6 HOURS PRN
Status: DISCONTINUED | OUTPATIENT
Start: 2024-10-28 | End: 2024-11-10 | Stop reason: HOSPADM

## 2024-10-28 RX ORDER — INSULIN GLARGINE 100 [IU]/ML
6 INJECTION, SOLUTION SUBCUTANEOUS EVERY MORNING
Status: DISCONTINUED | OUTPATIENT
Start: 2024-10-28 | End: 2024-10-29

## 2024-10-28 RX ORDER — CALCIUM ACETATE 667 MG/1
1334 CAPSULE ORAL
Status: DISCONTINUED | OUTPATIENT
Start: 2024-10-28 | End: 2024-10-28

## 2024-10-28 RX ORDER — INSULIN ASPART INJECTION 100 [IU]/ML
12 INJECTION, SOLUTION SUBCUTANEOUS
COMMUNITY

## 2024-10-28 RX ORDER — PREDNISONE 20 MG/1
20 TABLET ORAL DAILY
Status: COMPLETED | OUTPATIENT
Start: 2024-11-01 | End: 2024-11-01

## 2024-10-28 RX ORDER — HEPARIN SODIUM 5000 [USP'U]/ML
7500 INJECTION, SOLUTION INTRAVENOUS; SUBCUTANEOUS EVERY 8 HOURS SCHEDULED
Status: DISCONTINUED | OUTPATIENT
Start: 2024-10-28 | End: 2024-10-28

## 2024-10-28 RX ORDER — ENOXAPARIN SODIUM 100 MG/ML
40 INJECTION SUBCUTANEOUS EVERY 24 HOURS
Status: DISCONTINUED | OUTPATIENT
Start: 2024-10-28 | End: 2024-10-28

## 2024-10-28 RX ORDER — AMIODARONE HYDROCHLORIDE 200 MG/1
200 TABLET ORAL 2 TIMES DAILY
Status: DISCONTINUED | OUTPATIENT
Start: 2024-10-28 | End: 2024-10-30

## 2024-10-28 RX ORDER — DEXTROSE 50 % IN WATER (D50W) INTRAVENOUS SYRINGE
25
Status: DISCONTINUED | OUTPATIENT
Start: 2024-10-28 | End: 2024-10-29

## 2024-10-28 RX ORDER — TALC
3 POWDER (GRAM) TOPICAL NIGHTLY PRN
Status: DISCONTINUED | OUTPATIENT
Start: 2024-10-28 | End: 2024-11-10 | Stop reason: HOSPADM

## 2024-10-28 RX ORDER — ATORVASTATIN CALCIUM 40 MG/1
40 TABLET, FILM COATED ORAL EVERY MORNING
Status: DISCONTINUED | OUTPATIENT
Start: 2024-10-28 | End: 2024-11-10 | Stop reason: HOSPADM

## 2024-10-28 RX ORDER — FLUTICASONE FUROATE AND VILANTEROL 100; 25 UG/1; UG/1
1 POWDER RESPIRATORY (INHALATION)
Status: DISCONTINUED | OUTPATIENT
Start: 2024-10-28 | End: 2024-11-10 | Stop reason: HOSPADM

## 2024-10-28 RX ORDER — PANTOPRAZOLE SODIUM 40 MG/1
40 TABLET, DELAYED RELEASE ORAL
Status: DISCONTINUED | OUTPATIENT
Start: 2024-10-28 | End: 2024-11-04

## 2024-10-28 RX ORDER — LEVOFLOXACIN 25 MG/ML
250 SOLUTION ORAL DAILY
Status: COMPLETED | OUTPATIENT
Start: 2024-10-28 | End: 2024-10-30

## 2024-10-28 RX ORDER — ASPIRIN 81 MG/1
81 TABLET ORAL DAILY
Status: DISCONTINUED | OUTPATIENT
Start: 2024-10-28 | End: 2024-11-07

## 2024-10-28 RX ORDER — ACETAMINOPHEN 325 MG/1
650 TABLET ORAL EVERY 4 HOURS PRN
Status: DISCONTINUED | OUTPATIENT
Start: 2024-10-28 | End: 2024-11-08

## 2024-10-28 RX ORDER — CALCIUM ACETATE 667 MG/1
1334 CAPSULE ORAL
COMMUNITY

## 2024-10-28 RX ADMIN — INSULIN LISPRO 8 UNITS: 100 INJECTION, SOLUTION INTRAVENOUS; SUBCUTANEOUS at 05:44

## 2024-10-28 RX ADMIN — INSULIN LISPRO 8 UNITS: 100 INJECTION, SOLUTION INTRAVENOUS; SUBCUTANEOUS at 05:43

## 2024-10-28 SDOH — ECONOMIC STABILITY: FOOD INSECURITY: WITHIN THE PAST 12 MONTHS, THE FOOD YOU BOUGHT JUST DIDN'T LAST AND YOU DIDN'T HAVE MONEY TO GET MORE.: NEVER TRUE

## 2024-10-28 SDOH — SOCIAL STABILITY: SOCIAL INSECURITY
WITHIN THE LAST YEAR, HAVE YOU BEEN KICKED, HIT, SLAPPED, OR OTHERWISE PHYSICALLY HURT BY YOUR PARTNER OR EX-PARTNER?: NO

## 2024-10-28 SDOH — SOCIAL STABILITY: SOCIAL INSECURITY: ARE THERE ANY APPARENT SIGNS OF INJURIES/BEHAVIORS THAT COULD BE RELATED TO ABUSE/NEGLECT?: NO

## 2024-10-28 SDOH — SOCIAL STABILITY: SOCIAL INSECURITY: HAVE YOU HAD THOUGHTS OF HARMING ANYONE ELSE?: NO

## 2024-10-28 SDOH — SOCIAL STABILITY: SOCIAL INSECURITY: DO YOU FEEL ANYONE HAS EXPLOITED OR TAKEN ADVANTAGE OF YOU FINANCIALLY OR OF YOUR PERSONAL PROPERTY?: NO

## 2024-10-28 SDOH — SOCIAL STABILITY: SOCIAL INSECURITY: WITHIN THE LAST YEAR, HAVE YOU BEEN AFRAID OF YOUR PARTNER OR EX-PARTNER?: NO

## 2024-10-28 SDOH — SOCIAL STABILITY: SOCIAL INSECURITY: HAS ANYONE EVER THREATENED TO HURT YOUR FAMILY OR YOUR PETS?: NO

## 2024-10-28 SDOH — SOCIAL STABILITY: SOCIAL INSECURITY
WITHIN THE LAST YEAR, HAVE YOU BEEN RAPED OR FORCED TO HAVE ANY KIND OF SEXUAL ACTIVITY BY YOUR PARTNER OR EX-PARTNER?: NO

## 2024-10-28 SDOH — SOCIAL STABILITY: SOCIAL INSECURITY: WITHIN THE LAST YEAR, HAVE YOU BEEN HUMILIATED OR EMOTIONALLY ABUSED IN OTHER WAYS BY YOUR PARTNER OR EX-PARTNER?: NO

## 2024-10-28 SDOH — ECONOMIC STABILITY: FOOD INSECURITY: WITHIN THE PAST 12 MONTHS, YOU WORRIED THAT YOUR FOOD WOULD RUN OUT BEFORE YOU GOT THE MONEY TO BUY MORE.: NEVER TRUE

## 2024-10-28 SDOH — SOCIAL STABILITY: SOCIAL INSECURITY: HAVE YOU HAD ANY THOUGHTS OF HARMING ANYONE ELSE?: NO

## 2024-10-28 SDOH — ECONOMIC STABILITY: INCOME INSECURITY: IN THE PAST 12 MONTHS HAS THE ELECTRIC, GAS, OIL, OR WATER COMPANY THREATENED TO SHUT OFF SERVICES IN YOUR HOME?: NO

## 2024-10-28 SDOH — SOCIAL STABILITY: SOCIAL INSECURITY: DOES ANYONE TRY TO KEEP YOU FROM HAVING/CONTACTING OTHER FRIENDS OR DOING THINGS OUTSIDE YOUR HOME?: NO

## 2024-10-28 SDOH — SOCIAL STABILITY: SOCIAL INSECURITY: WERE YOU ABLE TO COMPLETE ALL THE BEHAVIORAL HEALTH SCREENINGS?: YES

## 2024-10-28 SDOH — SOCIAL STABILITY: SOCIAL INSECURITY: DO YOU FEEL UNSAFE GOING BACK TO THE PLACE WHERE YOU ARE LIVING?: NO

## 2024-10-28 SDOH — SOCIAL STABILITY: SOCIAL INSECURITY: ARE YOU OR HAVE YOU BEEN THREATENED OR ABUSED PHYSICALLY, EMOTIONALLY, OR SEXUALLY BY ANYONE?: NO

## 2024-10-28 SDOH — SOCIAL STABILITY: SOCIAL INSECURITY: ABUSE: ADULT

## 2024-10-28 ASSESSMENT — COGNITIVE AND FUNCTIONAL STATUS - GENERAL
DAILY ACTIVITIY SCORE: 24
TOILETING: A LITTLE
MOBILITY SCORE: 20
PATIENT BASELINE BEDBOUND: NO
DRESSING REGULAR UPPER BODY CLOTHING: A LITTLE
PERSONAL GROOMING: A LITTLE
STANDING UP FROM CHAIR USING ARMS: A LITTLE
DAILY ACTIVITIY SCORE: 21
MOBILITY SCORE: 23
CLIMB 3 TO 5 STEPS WITH RAILING: A LITTLE
MOVING TO AND FROM BED TO CHAIR: A LITTLE
CLIMB 3 TO 5 STEPS WITH RAILING: A LITTLE
WALKING IN HOSPITAL ROOM: A LITTLE

## 2024-10-28 ASSESSMENT — LIFESTYLE VARIABLES
AUDIT-C TOTAL SCORE: 0
SKIP TO QUESTIONS 9-10: 1
HOW OFTEN DO YOU HAVE A DRINK CONTAINING ALCOHOL: NEVER
AUDIT-C TOTAL SCORE: 0
HOW OFTEN DO YOU HAVE 6 OR MORE DRINKS ON ONE OCCASION: NEVER
HOW MANY STANDARD DRINKS CONTAINING ALCOHOL DO YOU HAVE ON A TYPICAL DAY: PATIENT DOES NOT DRINK

## 2024-10-28 ASSESSMENT — ACTIVITIES OF DAILY LIVING (ADL)
FEEDING YOURSELF: INDEPENDENT
ADEQUATE_TO_COMPLETE_ADL: YES
LACK_OF_TRANSPORTATION: NO
WALKS IN HOME: INDEPENDENT
TOILETING: INDEPENDENT
GROOMING: INDEPENDENT
HEARING - LEFT EAR: FUNCTIONAL
HEARING - RIGHT EAR: FUNCTIONAL
JUDGMENT_ADEQUATE_SAFELY_COMPLETE_DAILY_ACTIVITIES: YES
BATHING: INDEPENDENT
PATIENT'S MEMORY ADEQUATE TO SAFELY COMPLETE DAILY ACTIVITIES?: YES
DRESSING YOURSELF: INDEPENDENT

## 2024-10-28 ASSESSMENT — PAIN - FUNCTIONAL ASSESSMENT
PAIN_FUNCTIONAL_ASSESSMENT: 0-10

## 2024-10-28 ASSESSMENT — PAIN SCALES - GENERAL
PAINLEVEL_OUTOF10: 0 - NO PAIN
PAINLEVEL_OUTOF10: 5 - MODERATE PAIN

## 2024-10-28 ASSESSMENT — COLUMBIA-SUICIDE SEVERITY RATING SCALE - C-SSRS
2. HAVE YOU ACTUALLY HAD ANY THOUGHTS OF KILLING YOURSELF?: NO
6. HAVE YOU EVER DONE ANYTHING, STARTED TO DO ANYTHING, OR PREPARED TO DO ANYTHING TO END YOUR LIFE?: NO
1. IN THE PAST MONTH, HAVE YOU WISHED YOU WERE DEAD OR WISHED YOU COULD GO TO SLEEP AND NOT WAKE UP?: NO

## 2024-10-28 ASSESSMENT — PAIN DESCRIPTION - DESCRIPTORS: DESCRIPTORS: THROBBING

## 2024-10-28 ASSESSMENT — PATIENT HEALTH QUESTIONNAIRE - PHQ9
1. LITTLE INTEREST OR PLEASURE IN DOING THINGS: NOT AT ALL
2. FEELING DOWN, DEPRESSED OR HOPELESS: NOT AT ALL
SUM OF ALL RESPONSES TO PHQ9 QUESTIONS 1 & 2: 0

## 2024-10-28 NOTE — Clinical Note
Note patient on versed drip at 2 mg'hr and fentanyl drip at 100 mcg/hr, therefore  RN's monitoring patient sedation with drip rate

## 2024-10-28 NOTE — Clinical Note
Accessed site: left femoral vein.   Ultrasound guidance was used. Multiple attempts, difficult anatomy in neck

## 2024-10-28 NOTE — PLAN OF CARE
Problem: ABCDS Injury Assessment  Goal: Absence of physical injury  10/28/2024 0008 by Tami Lawson RN  Outcome: Progressing  10/27/2024 1039 by Liliya Cleaning RN  Outcome: Progressing     Problem: Chronic Conditions and Co-morbidities  Goal: Patient's chronic conditions and co-morbidity symptoms are monitored and maintained or improved  10/28/2024 0008 by Tami Lawson RN  Outcome: Progressing  10/27/2024 1039 by Liliya Cleaning RN  Outcome: Progressing     Problem: Skin/Tissue Integrity  Goal: Absence of new skin breakdown  Description: 1.  Monitor for areas of redness and/or skin breakdown  2.  Assess vascular access sites hourly  3.  Every 4-6 hours minimum:  Change oxygen saturation probe site  4.  Every 4-6 hours:  If on nasal continuous positive airway pressure, respiratory therapy assess nares and determine need for appliance change or resting period.  10/28/2024 0008 by Tami Lawson RN  Outcome: Progressing  10/27/2024 1039 by Liliya Cleaning RN  Outcome: Progressing

## 2024-10-28 NOTE — NURSING NOTE
C/o right sided chest/shoulder pain.  EKG, VS completed.  Pt states it is same as pain as her chief complaint.  Nunu MCDONOUGH,NP notified.

## 2024-10-28 NOTE — SIGNIFICANT EVENT
Rapid Response Nurse Note: RADAR alert: Score = 7    Pager time:   Arrival time:   Event end time:   Location: [place] [address]  [x] Triage by phone or secure messaging    Rapid response initiated by:  [] Rapid response RN [] Family [] Nursing Supervisor [] Physician   [x] RADAR auto page [] Sepsis auto-page [] RN [] RT   [] NP/PA [] Other:     Primary reason for call:   [] BAT [] New CPAP/BiPAP [] Bleeding [] Change in mental status   [] Chest pain [] Code blue [] FiO2 >/= 50% [] HR </= 40 bpm   [] HR >/= 130 bpm [] Hyperglycemia [] Hypoglycemia [x] RADAR    [] RR </= 8 bpm [] RR >/= 30 bpm [] SBP </= 90 mmHg [] SpO2 < 90%   [] Seizure [] Sepsis [] Shorness of breath  [] Staff concern: see comments     Initial VS and/or RADAR VS: T 36.5 °C; ; RR 20; BP 97/65; SPO2 93%.      Interventions:  [x] None [] ABG/VBG [] Assist w/ICU transfer [] BAT paged    [] Bag mask [] Blood [] Cardioversion [] Code Blue   [] Code blue for intubation [] Code status changed [] Chest x-ray [] EKG   [] IV fluid/bolus [] KUB x-ray [] Labs/cultures [] Medication   [] Nebulizer treatment [] NIPPV (CPAP/BiPAP) [] Oxygen [] Oral airway   [] Peripheral IV [] Palliative care consult [] CT/MRI [] Sepsis protocol    [] Suctioned [] Other:     Outcome:  [] Coded and  [] Code blue for intubation [] Coded and transferred to ICU []  on division   [x] Remained on division (no change) [] Remained on division + additional monitoring [] Remained in ED [] Transferred to ED   [] Transferred to ICU [] Transferred to inpatient status [] Transferred for interventions (procedure) [] Transferred to ICU stepdown    [] Transferred to surgery [] Transferred to telemetry [] Sepsis protocol [] STEMI protocol   [] Stroke protocol [x] Bedside nurse instructed to page rapid response for any concerns or acute change in condition/VS     Additional Comments: RADAR auto page received for above vitals. Per bedside RN, no acute changes or concerns at  this time.  Please page rapid response for any concerns for deterioration or assistance needed.

## 2024-10-28 NOTE — Clinical Note
Angioplasty of the left anterior descending lesion. Inflation 1: Pressure = 18 dameon; Duration = 5 sec. Inflation 2: Pressure = 24 dameon; Duration = 5 sec. For multiple inflations

## 2024-10-28 NOTE — NURSING NOTE
Pt admitted with light headedness upon rising from cart. Pt instructed by transport to lay back down.  Pt recovered and was able to transfer to chair without incident. Assessment, skin check, vitals completed.  Pt placed on tele [id number]. Pt oriented to unit, hourly rounding, call light.  Pt has no needs at this time.

## 2024-10-28 NOTE — PROGRESS NOTES
[facility] Hospitalist Progress Note    Admitting Date and Time: 10/22/2024 11:40 PM  Admit Dx: Acute bronchitis [J20.9]  Bigeminy [I49.8]  Rhinovirus [B34.8]  ESRD on dialysis (HCC) [N18.6, Z99.2]    Subjective:  Patient is being followed for Acute bronchitis [J20.9]  Bigeminy [I49.8]  Rhinovirus [B34.8]  ESRD on dialysis (HCC) [N18.6, Z99.2]   Pt was seen and examined today. Patient was going to be discharged today, but had chest pain, tachycardia and hypotension.    ROS: denies fever, chills, cp, sob, n/v, HA unless stated above.     amiodarone  200 mg Oral BID    apixaban  5 mg Oral BID    levoFLOXacin  250 mg Oral Daily    guaiFENesin  400 mg Oral Q8H    aspirin  81 mg Oral QAM    atorvastatin  40 mg Oral QAM    calcium acetate  2 capsule Oral TID WC    insulin glargine  12 Units SubCUTAneous QAM    sodium chloride flush  5-40 mL IntraVENous 2 times per day    ipratropium 0.5 mg-albuterol 2.5 mg  1 Dose Inhalation Q4H WA RT    predniSONE  40 mg Oral Daily    arformoterol tartrate  15 mcg Nebulization BID RT    miconazole nitrate   Topical BID    miconazole   Topical BID    insulin lispro  12 Units SubCUTAneous TID AC     benzocaine-menthol, 1 lozenge, Q2H PRN  midodrine, 10 mg, BID PRN  sodium chloride flush, 5-40 mL, PRN  sodium chloride, , PRN  ondansetron, 4 mg, Q8H PRN   Or  ondansetron, 4 mg, Q6H PRN  polyethylene glycol, 17 g, Daily PRN  acetaminophen, 650 mg, Q6H PRN   Or  acetaminophen, 650 mg, Q6H PRN         Objective:    BP (!) 83/50   Pulse (!) 127   Temp 97.7 °F (36.5 °C)   Resp 16   Ht 1.626 m (5' 4\")   Wt 117.2 kg (258 lb 6.1 oz)   SpO2 92%   BMI 44.35 kg/m²     General Appearance: alert and oriented to person, place and time and in no acute distress  Skin: warm and dry  Head: normocephalic and atraumatic  Pulmonary/Chest: bilateral ronchi, bibasilar crackles, no wheezing, normal air movement, no respiratory distress  Cardiovascular: normal rate, normal S1 and S2  Abdomen: soft, 
       [facility] Hospitalist Progress Note    Admitting Date and Time: 10/22/2024 11:40 PM  Admit Dx: Acute bronchitis [J20.9]  Bigeminy [I49.8]  Rhinovirus [B34.8]  ESRD on dialysis (HCC) [N18.6, Z99.2]    Subjective:  Patient is being followed for Acute bronchitis [J20.9]  Bigeminy [I49.8]  Rhinovirus [B34.8]  ESRD on dialysis (HCC) [N18.6, Z99.2]   Pt was seen and examined today. HR improved with amiodarone. She had BG > 500 this morning.    ROS: denies fever, chills, cp, sob, n/v, HA unless stated above.     [START ON 10/27/2024] insulin glargine  26 Units SubCUTAneous QAM    insulin lispro  0-8 Units SubCUTAneous 4x Daily AC & HS    amiodarone  200 mg Oral BID    apixaban  5 mg Oral BID    levoFLOXacin  250 mg Oral Daily    guaiFENesin  400 mg Oral Q8H    aspirin  81 mg Oral QAM    atorvastatin  40 mg Oral QAM    calcium acetate  2 capsule Oral TID WC    sodium chloride flush  5-40 mL IntraVENous 2 times per day    ipratropium 0.5 mg-albuterol 2.5 mg  1 Dose Inhalation Q4H WA RT    predniSONE  40 mg Oral Daily    arformoterol tartrate  15 mcg Nebulization BID RT    miconazole nitrate   Topical BID    miconazole   Topical BID    insulin lispro  12 Units SubCUTAneous TID AC     benzocaine-menthol, 1 lozenge, Q2H PRN  midodrine, 10 mg, BID PRN  sodium chloride flush, 5-40 mL, PRN  sodium chloride, , PRN  ondansetron, 4 mg, Q8H PRN   Or  ondansetron, 4 mg, Q6H PRN  polyethylene glycol, 17 g, Daily PRN  acetaminophen, 650 mg, Q6H PRN   Or  acetaminophen, 650 mg, Q6H PRN         Objective:    BP (!) 94/38   Pulse 95   Temp 97.5 °F (36.4 °C) (Oral)   Resp 18   Ht 1.626 m (5' 4\")   Wt 117.2 kg (258 lb 6.1 oz)   SpO2 100%   BMI 44.35 kg/m²     General Appearance: alert and oriented to person, place and time and in no acute distress  Skin: warm and dry  Head: normocephalic and atraumatic  Pulmonary/Chest: bilateral ronchi, bibasilar crackles, no wheezing, normal air movement, no respiratory 
       [facility] Hospitalist Progress Note    Admitting Date and Time: 10/22/2024 11:40 PM  Admit Dx: Acute bronchitis [J20.9]  Bigeminy [I49.8]  Rhinovirus [B34.8]  ESRD on dialysis (HCC) [N18.6, Z99.2]    Subjective:  Patient is being followed for Acute bronchitis [J20.9]  Bigeminy [I49.8]  Rhinovirus [B34.8]  ESRD on dialysis (HCC) [N18.6, Z99.2]   Pt was seen and examined today. BG improved on adjusted insulin regimen    ROS: denies fever, chills, cp, sob, n/v, HA unless stated above.     [START ON 10/28/2024] predniSONE  30 mg Oral Daily    Followed by    [START ON 10/31/2024] predniSONE  20 mg Oral Daily    Followed by    [START ON 11/3/2024] predniSONE  10 mg Oral Daily    insulin glargine  26 Units SubCUTAneous QAM    insulin lispro  0-8 Units SubCUTAneous 4x Daily AC & HS    amiodarone  200 mg Oral BID    apixaban  5 mg Oral BID    levoFLOXacin  250 mg Oral Daily    guaiFENesin  400 mg Oral Q8H    aspirin  81 mg Oral QAM    atorvastatin  40 mg Oral QAM    calcium acetate  2 capsule Oral TID WC    sodium chloride flush  5-40 mL IntraVENous 2 times per day    ipratropium 0.5 mg-albuterol 2.5 mg  1 Dose Inhalation Q4H WA RT    arformoterol tartrate  15 mcg Nebulization BID RT    miconazole nitrate   Topical BID    miconazole   Topical BID    insulin lispro  12 Units SubCUTAneous TID AC     benzocaine-menthol, 1 lozenge, Q2H PRN  midodrine, 10 mg, BID PRN  sodium chloride flush, 5-40 mL, PRN  sodium chloride, , PRN  ondansetron, 4 mg, Q8H PRN   Or  ondansetron, 4 mg, Q6H PRN  polyethylene glycol, 17 g, Daily PRN  acetaminophen, 650 mg, Q6H PRN   Or  acetaminophen, 650 mg, Q6H PRN         Objective:    /73   Pulse 97   Temp 97.8 °F (36.6 °C) (Oral)   Resp 18   Ht 1.626 m (5' 4\")   Wt 119.4 kg (263 lb 3.2 oz)   SpO2 95%   BMI 45.18 kg/m²     General Appearance: alert and oriented to person, place and time and in no acute distress  Skin: warm and dry  Head: normocephalic and 
       [facility] Hospitalist Progress Note    Admitting Date and Time: 10/22/2024 11:40 PM  Admit Dx: Acute bronchitis [J20.9]  Bigeminy [I49.8]  Rhinovirus [B34.8]  ESRD on dialysis (HCC) [N18.6, Z99.2]    Subjective:  Patient is being followed for Acute bronchitis [J20.9]  Bigeminy [I49.8]  Rhinovirus [B34.8]  ESRD on dialysis (HCC) [N18.6, Z99.2]   Pt was seen and examined today. Denies any new issues. States she is continuing to have issues with coughing and SOB.    ROS: denies fever, chills, cp, sob, n/v, HA unless stated above.     levoFLOXacin  750 mg Oral Daily    Followed by    [START ON 10/25/2024] levoFLOXacin  250 mg Oral Daily    guaiFENesin  400 mg Oral Q8H    aspirin  81 mg Oral QAM    atorvastatin  40 mg Oral QAM    calcium acetate  2 capsule Oral TID WC    insulin glargine  12 Units SubCUTAneous QAM    sodium chloride flush  5-40 mL IntraVENous 2 times per day    heparin (porcine)  5,000 Units SubCUTAneous 3 times per day    ipratropium 0.5 mg-albuterol 2.5 mg  1 Dose Inhalation Q4H WA RT    methylPREDNISolone  40 mg IntraVENous Q6H    Followed by    [START ON 10/25/2024] predniSONE  40 mg Oral Daily    arformoterol tartrate  15 mcg Nebulization BID RT    miconazole nitrate   Topical BID    miconazole   Topical BID    insulin lispro  12 Units SubCUTAneous TID AC     midodrine, 10 mg, BID PRN  sodium chloride flush, 5-40 mL, PRN  sodium chloride, , PRN  ondansetron, 4 mg, Q8H PRN   Or  ondansetron, 4 mg, Q6H PRN  polyethylene glycol, 17 g, Daily PRN  acetaminophen, 650 mg, Q6H PRN   Or  acetaminophen, 650 mg, Q6H PRN         Objective:    BP (!) 100/53   Pulse 73   Temp 97.7 °F (36.5 °C) (Oral)   Resp 16   Ht 1.626 m (5' 4\")   Wt 117 kg (258 lb)   SpO2 94%   BMI 44.29 kg/m²     General Appearance: alert and in no acute distress  Skin: warm and dry  Head: normocephalic and atraumatic  Pulmonary/Chest: Bibasilar crackles, bilateral rhonchi, bilateral wheezing, normal air movement, no 
4 Eyes Skin Assessment     NAME:  Lizette Murray  YOB: 1955  MEDICAL RECORD NUMBER:  [id number]    The patient is being assessed for  Admission    I agree that at least one RN has performed a thorough Head to Toe Skin Assessment on the patient. ALL assessment sites listed below have been assessed.      Areas assessed by both nurses:    Legs. Feet and Heels        Does the Patient have a Wound? Yes wound(s) were present on assessment. LDA wound assessment was Initiated and completed by RN  Patient has wound on LLE that is managed by podiatry, she is refusing to have it unwrapped for assesment. Refusing assessment of buttocks and back side.       Dm Prevention initiated by RN: No  Wound Care Orders initiated by RN: No    Pressure Injury (Stage 3,4, Unstageable, DTI, NWPT, and Complex wounds) if present, place Wound referral order by RN under : No    New Ostomies, if present place, Ostomy referral order under : No     Nurse 1 eSignature: Electronically signed by maggy amin RN on 10/23/24 at 4:57 PM EDT    **SHARE this note so that the co-signing nurse can place an eSignature**    Nurse 2 eSignature: {Esignature:[id number]}    
AAT APPROXIMATELY  2345 PATIENT STATES SOB, COPHED AND NOTHING CAME UP. SAO2  93%, PULSE 73, /78. ENCOURAGED TO  SLOW BREATHING, PATIENT C/O NASAL STUFFINESS. SOLUMEDROL AND GUAIFFESEN GIVEN. PATIENT CALMER AND RESTING COMFORTABLY AFTER.  
[facility]  MD Sidney Amezcua, MD Melissa Morel, CNP   Jaja Echavarria, ANP  Priya Hope, CASH  Progress Note    10/26/2024    SUBJECTIVE:   10/24: Seen while sitting up in the chair. Overall, feels better. Still complains of a cough, unable to expectorate. Does have some shortness of breath with exertion. BP is stable, on the lower side.     10/25: Sitting up in the chair in no acute distress.  She denies any acute complaints.  She denies any shortness of breath, chest pain, or palpitations.  She is hopeful for discharge later today.  Her blood pressure is stable.    10/26 charts reviewed , HD no reported issues    PROBLEM LIST:    Principal Problem:    Acute bronchitis  Active Problems:    Diabetes mellitus (HCC)    ESRD on hemodialysis (HCC)    Lymphedema of both lower extremities    Essential hypertension    Coronary artery disease of native artery of native heart with stable angina pectoris (HCC)    Aortic stenosis, severe    Acute bronchitis due to other specified organisms  Resolved Problems:    * No resolved hospital problems. *         DIET:    ADULT DIET; Regular     MEDS (scheduled):    [START ON 10/27/2024] insulin glargine  26 Units SubCUTAneous QAM    insulin lispro  0-8 Units SubCUTAneous 4x Daily AC & HS    amiodarone  200 mg Oral BID    apixaban  5 mg Oral BID    levoFLOXacin  250 mg Oral Daily    guaiFENesin  400 mg Oral Q8H    aspirin  81 mg Oral QAM    atorvastatin  40 mg Oral QAM    calcium acetate  2 capsule Oral TID WC    sodium chloride flush  5-40 mL IntraVENous 2 times per day    ipratropium 0.5 mg-albuterol 2.5 mg  1 Dose Inhalation Q4H WA RT    predniSONE  40 mg Oral Daily    arformoterol tartrate  15 mcg Nebulization BID RT    miconazole nitrate   Topical BID    miconazole   Topical BID    insulin lispro  12 Units SubCUTAneous TID AC       MEDS (infusions):   sodium chloride         MEDS (prn):  benzocaine-menthol, midodrine, 
[facility]  MD Sidney Amezcua, MD Melissa Morel, CNP   Jaja Echavarria, YOSHI Hope, CASH  Progress Note    10/27/2024    SUBJECTIVE:   Comfortable on room air she is telling me she decided to proceed with evaluation at [facility] and the plan is to transfer early morning over their  PROBLEM LIST:    Principal Problem:    Acute bronchitis  Active Problems:    Diabetes mellitus (HCC)    ESRD on hemodialysis ([facility])    Lymphedema of both lower extremities    Essential hypertension    Coronary artery disease of native artery of native heart with stable angina pectoris (HCC)    Aortic stenosis, severe    Acute bronchitis due to other specified organisms  Resolved Problems:    * No resolved hospital problems. *         DIET:    ADULT DIET; Regular     MEDS (scheduled):    [START ON 10/28/2024] predniSONE  30 mg Oral Daily    Followed by    [START ON 10/31/2024] predniSONE  20 mg Oral Daily    Followed by    [START ON 11/3/2024] predniSONE  10 mg Oral Daily    [START ON 10/28/2024] insulin glargine  18 Units SubCUTAneous QAM    insulin lispro  8 Units SubCUTAneous TID AC    psyllium  1 packet Oral Daily    insulin lispro  0-8 Units SubCUTAneous 4x Daily AC & HS    amiodarone  200 mg Oral BID    apixaban  5 mg Oral BID    levoFLOXacin  250 mg Oral Daily    guaiFENesin  400 mg Oral Q8H    aspirin  81 mg Oral QAM    atorvastatin  40 mg Oral QAM    calcium acetate  2 capsule Oral TID WC    sodium chloride flush  5-40 mL IntraVENous 2 times per day    ipratropium 0.5 mg-albuterol 2.5 mg  1 Dose Inhalation Q4H WA RT    arformoterol tartrate  15 mcg Nebulization BID RT    miconazole nitrate   Topical BID    miconazole   Topical BID       MEDS (infusions):   dextrose      sodium chloride         MEDS (prn):  glucose, dextrose bolus **OR** dextrose bolus, glucagon (rDNA), dextrose, hydrOXYzine pamoate, calcium carbonate, benzocaine-menthol, midodrine, sodium 
[facility]  MD Sidney Amezcua, MD Sedrick Jones, MD Melissa Jones, CNP   Jaja Echavarria, ANP  Priya Hope, CASH  Progress Note    10/25/2024    SUBJECTIVE:   10/24: Seen while sitting up in the chair. Overall, feels better. Still complains of a cough, unable to expectorate. Does have some shortness of breath with exertion. BP is stable, on the lower side.     10/25: Sitting up in the chair in no acute distress.  She denies any acute complaints.  She denies any shortness of breath, chest pain, or palpitations.  She is hopeful for discharge later today.  Her blood pressure is stable.    PROBLEM LIST:    Principal Problem:    Acute bronchitis  Active Problems:    Diabetes mellitus (HCC)    ESRD on hemodialysis (HCC)    Lymphedema of both lower extremities    Essential hypertension    Coronary artery disease of native artery of native heart with stable angina pectoris (HCC)    Aortic stenosis, severe    Acute bronchitis due to other specified organisms  Resolved Problems:    * No resolved hospital problems. *         DIET:    ADULT DIET; Regular     MEDS (scheduled):    amiodarone  200 mg Oral BID    apixaban  5 mg Oral BID    levoFLOXacin  250 mg Oral Daily    guaiFENesin  400 mg Oral Q8H    aspirin  81 mg Oral QAM    atorvastatin  40 mg Oral QAM    calcium acetate  2 capsule Oral TID WC    insulin glargine  12 Units SubCUTAneous QAM    sodium chloride flush  5-40 mL IntraVENous 2 times per day    ipratropium 0.5 mg-albuterol 2.5 mg  1 Dose Inhalation Q4H WA RT    predniSONE  40 mg Oral Daily    arformoterol tartrate  15 mcg Nebulization BID RT    miconazole nitrate   Topical BID    miconazole   Topical BID    insulin lispro  12 Units SubCUTAneous TID AC       MEDS (infusions):   sodium chloride         MEDS (prn):  benzocaine-menthol, midodrine, sodium chloride flush, sodium chloride, ondansetron **OR** ondansetron, polyethylene glycol, acetaminophen **OR** 
CLINICAL PHARMACY NOTE: MEDS TO BEDS    Total # of Prescriptions Filled: 3   The following medications were delivered to the patient:  Midodrine 10 mg   Guaifenesin 400 mg   Levofloxacin 250 mg     Additional Documentation:    
Call back received from  nursing, detailed nurse to nurse report given on patient. Patient ready for transport by [facility] to .  
Called  for nurse to nurse report, no answer, left voicemail and phone number to obtain nurse report.   
Dr. Gaspar notified of patients increasing anxiety. See new orders.   
Dr. Gaspar updated on heart rate. Orders obtained. Consult called to cardiology.  
Face sheet requested from . Faxed to [phone]. Also requesting imaging and echo to be sent through [facility]. Notified radiology - they can send imaging, but may not be able to send echo. They were going to contact echo department to see if anyone available to send.  
Message left with Dr Joseph's answering service. Patient's son requesting to talk with Dr Joseph. Call back stating he will be rounding soon. Notified patient.  
Notified BRITTANY Gambino of glucometer reading HI. Random glucose sent to lab.   
Notified [facility] of request to tx to [facility]  
PROGRESS NOTE       PATIENT PROBLEM LIST:  Patient Active Problem List   Diagnosis Code    Nausea & vomiting R11.2    Sepsis without acute organ dysfunction ([facility]) A41.9    Essential hypertension I10    Hyperlipidemia E78.5    Neck pain M54.2    Anemia D64.9    COVID-19 U07.1    Mild pulmonary hypertension ([facility]) I27.20    Obesity, Class III, BMI 40-49.9 (morbid obesity) E66.01    History of Clostridioides difficile colitis Z86.19    Anxiety and depression F41.9, F32.A    At high risk for falls Z91.81    Dizziness on standing R42    Acute cough R05.1    H/O heart artery stent Z95.5    Diabetes mellitus (HCC) E11.9    Hematemesis K92.0    ESRD on hemodialysis ([facility]) N18.6, Z99.2    Leg swelling M79.89    Lymphedema of both lower extremities I89.0    Rectal bleeding K62.5    BRBPR (bright red blood per rectum) K62.5    Aortic stenosis, mild I35.0    Anemia due to stage 5 chronic kidney disease ([facility]) N18.5, D63.1    History of pulmonary embolus (PE) Z86.711    Hyperkalemia E87.5    Pneumonia due to organism J18.9    Gallstones K80.20    Acute on chronic respiratory failure J96.20    Dyspnea R06.00    Fluid overload E87.70    Macrocytosis D75.89    Chronic anemia D64.9    End stage renal disease ([facility]) N18.6    Mediastinal lymphadenopathy R59.0    Coronary artery disease of native artery of native heart with stable angina pectoris ([facility]) I25.118    Encounter regarding vascular access for dialysis for ESRD ([facility]) N18.6, Z99.2    ESRD (end stage renal disease) ([facility]) N18.6    Wound infection T14.8XXA, L08.9    Diabetic ulcer of left great toe ([facility]) E11.621, L97.529    Multiple open wounds T07.XXXA    Aortic stenosis, severe I35.0    Acute bronchitis due to other specified organisms J20.8    Acute bronchitis J20.9       SUBJECTIVE:  Lizette Murray states she is somewhat more comfortable since undergoing dialysis yesterday but still short of breath with exertion but fortunately no further right upper chest and arm discomfort since 
Patient noted to have a dressing to the left lower leg new orders placed and dressing changed to left lower leg and dressing applied to the right lower leg also open area noted from previous blister noted on LDA wound care orders placed and wound care consult also placed will ask physician on rounds if podiatry needs consulted as well   
Patient reporting R shoulder pain all evening; was medicated with PO Tylenol x1 but 2 hours later is now reporting unbearable pain of the R shoulder. Attending TAWNYA Havrey notified of unrelieved pain. New med order received.   
Per [facility], no bed currently at .   
[facility] called back with 7AM ETA with [facility]. Called  to update of delayed transportation.  
Spoke to access center on pt transport,  has an accepting doctor, Dr Garcias with cardiology, they do not have a bed yet.   
Spoke with [facility] regarding transportation for tx to .  
[facility] called, pt has bed, [id number], report number .      would like a call back with an eta whenever available   
  10/24/24 0727 (!) 113/57 97.7 °F (36.5 °C) Oral 73 16 94 %   10/23/24 1925 (!) 100/50 97.7 °F (36.5 °C) Oral 75 19 91 %   10/23/24 1628 (!) 106/54 97.3 °F (36.3 °C) Oral 72 18 99 %   10/23/24 1607 (!) 96/50 97.8 °F (36.6 °C) -- 67 18 --   @      Intake/Output Summary (Last 24 hours) at 10/24/2024 1353  Last data filed at 10/24/2024 0700  Gross per 24 hour   Intake 490 ml   Output 3000 ml   Net -2510 ml         Wt Readings from Last 3 Encounters:   10/22/24 117 kg (258 lb)   10/09/24 116.5 kg (256 lb 13.4 oz)   09/28/24 108.3 kg (238 lb 12.1 oz)       Constitutional:  in no acute distress  HEENT: NC/AT, EOMI, sclera and conjunctiva are clear and anicteric, mucus membranes moist  Neck: Trachea midline, no JVD  Cardiovascular: S1, S2 regular rhythm, no murmur,or rub  Respiratory:  Scattered rhonchi present bilaterally. No crackles, no wheeze  Gastrointestinal:  Soft, nontender, nondistended, NABS  Ext: 2/3 + chronic lower extremity edema, feet warm  Skin: dry, no rash  Neuro: awake, alert, interactive      DATA:    Recent Labs     10/23/24  0029 10/24/24  0331   WBC 8.4 5.2   HGB 11.5 11.8   HCT 36.3 38.3   MCV 99.7 102.4*     Recent Labs     10/23/24  0029 10/24/24  0331    133   K 4.9 4.8   CL 87* 89*   CO2 25 27   MG 2.7* 2.2   PHOS  --  6.8*   BUN 71* 39*   CREATININE 8.9* 5.6*   ALT 28 28   AST 40* 29   BILITOT 0.4 0.4   ALKPHOS 124* 135*       No results found for: \"LABPROT\"    Assessment  1.  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  2.  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  Lower extremity edema, chronic     Recommendations  Continue IHD support for solute and volume clearance on Tuesday Thursday Saturday schedule  Continue other aspects of renal management  Continue midodrine 10 mg PRN, please give before dialysis today  Follow labs, BP         Electronically signed by TAWNYA Hand CNP on 10/24/2024 at 1:53 PM

## 2024-10-28 NOTE — Clinical Note
Angioplasty of the left anterior descending lesion. Inflation 1: Pressure = 16 dameon; Duration = 10 sec. Inflation 2: Pressure = 16 dameon; Duration = 10 sec.

## 2024-10-28 NOTE — Clinical Note
Dry, sterile, transparent dressing applied. Chg LFV. Transparent over arterial site L and transparent RFA access site

## 2024-10-28 NOTE — Clinical Note
Angioplasty of the left anterior descending lesion. Inflation 1: Pressure = 8 dameon; Duration = 5 sec.

## 2024-10-28 NOTE — PROGRESS NOTES
Pharmacy Medication History Review    Faviola Pleitez is a 69 y.o. female admitted for Severe aortic stenosis. Pharmacy reviewed the patient's [id number] medications and allergies for accuracy.    Medications ADDED:  ALL  Medications CHANGED:  NONE  Medications REMOVED:   NONE     The list below reflects the updated PTA list.   Prior to Admission Medications   Prescriptions Last Dose Informant   aspirin 81 mg EC tablet  Self, Other   Sig: Take 1 tablet (81 mg) by mouth once daily.   atorvastatin (Lipitor) 40 mg tablet  Self, Other   Sig: Take 1 tablet (40 mg) by mouth once daily in the morning.   calcium acetate (Phoslo) 667 mg capsule  Self, Other   Sig: Take 2 capsules (1,334 mg) by mouth 3 times daily (morning, midday, late afternoon). With meals   insulin aspart, with niacinamide, (Fiasp FlexTouch U-100 Insulin) 100 unit/mL (3 mL) injection  Self, Other   Sig: Inject 12 Units under the skin 3 times daily (morning, midday, late afternoon). Take as directed per insulin instructions before meals     Uses sliding scale currently   insulin glargine (Basaglar KwikPen U-100 Insulin) 100 unit/mL (3 mL) pen  Self, Other   Sig: Inject 12 Units under the skin once daily in the morning. Take as directed per insulin instructions.   midodrine (Proamatine) 5 mg tablet  Self, Other   Sig: Take 1 tablet prior to dialysis on Tuesday, Thursday and Saturday    Only uses as needed      Facility-Administered Medications: None        The list below reflects the updated allergy list. Please review each documented allergy for additional clarification and justification.  Allergies  Indicated as Unable to Assess by Reggie Wilson RN on 10/28/2024 (Patient not present)   Not on File         Patient accepts M2B at discharge.     Sources:   [facility]  Pharmacy dispense history  Patient interview Good historian  [facility] [phone] and spoke to Carlos Angulo who verified all medications patient was taking.  "    Additional Comments:  None       Jarrod Walker, [facility]  Transitions of Care Pharmacist  10/28/24     Secure Chat preferred   If no response call [phone] or Vocera \"Med Rec\"    "

## 2024-10-28 NOTE — H&P
History Of Present Illness:    Faviola Pleitez is a 69 y.o. female presenting from [facility] for evaluation of severe aortic valve stenosis. Hx of ESRD-HD, CAD with previous stent, lymphedema, anemia of chronic disease,  chronic respiratory failure (on 3LNC at home), MO, DM, HLP, HTN.     Seen for hospital follow up by PCP on 10/9/24 for admission 9/24-9/28 for infection in the diabetic wounds patient required antibiotics and metronidazole. She had elevated blood glucose levels since discharge in the 200s to 300s. Patient was on Basaglar 12 units and was using the sliding scale of the short acting insulin. She was not able to get the Levaquin since it was not available in her pharmacy. Patient did not look in other pharmacies. Insulin increased: Basaglar 16 and to take 12 of Humalog with each meal in addition to sliding scale bring readings , levaquin rx given for cellulitis. Newly diagnosed cardiomyopathy with EF 42%, moderate global hypokinesis, aortic Trileaflet valve. Severely calcified right and noncoronary cusps. Mild regurgitation. Severe stenosis of the aortic valve. AV mean gradient is 49 mmHg. AV area by continuity VTI is 0.6 cm2. Mod MR, mild TR, RVSP 58mmHg. Follow up appt scheduled for wound clinic next week. Dr. Molina to follow severe AS.     Admission 10/22-10/28 [facility] following history of right shoulder and right upper chest discomfort she was subsequently diagnosed with a rhinovirus infection, new onset afib: insulin changed further:  Basaglar to 26 units daily and start using Insulin Aspart 12 units three times daily. For rhinovirus and acute bronchitis: Take Levofloxacin daily for another 3 days. Take Prednisone taper as prescribed. Start using Dulera inhaler twice a day moving forward. Use Guaifenesin up to 4 times a day as needed for cough. Change your Midodrine to 3 times daily scheduled. Seen by cardiology and started on amio and eliquis. Cardiology felt valves and coronaries  "needed evaluated and she agreed to transfer to [facility] for evaluation.     Patient complains of fatigue, dizziness, presyncope. All other systems negative except those mentioned in HPI.      Last Recorded Vitals:  Vitals:    10/28/24 0920 10/28/24 0930 10/28/24 1118 10/28/24 1141   BP: 86/54 96/63  96/52   BP Location: Right arm Right arm     Pulse:    74   Resp:    20   Temp:    36.2 °C (97.2 °F)   TempSrc:   Temporal    SpO2:    92%   Weight:   119 kg (262 lb 12.6 oz)    Height:   1.626 m (5' 4\")        Last Labs:  CBC - 10/28/2024:  9:50 AM  14.3 11.2 125    36.1      CMP - 10/28/2024:  9:50 AM  8.5 6.2 22 --- 0.5   _ 3.7 14 94      PTT - 10/28/2024:  9:50 AM  1.6   18.6 27     BNP   Date/Time Value Ref Range Status   10/28/2024 09:50 AM 1,284 (H) 0 - 99 pg/mL Final     Hemoglobin A1C   Date/Time Value Ref Range Status   08/07/2024 03:16 AM 7.0 (H) 4.0 - 5.6 % Final   01/16/2024 04:33 PM 7.4 (H) 4.0 - 5.6 % Final      Last I/O:  No intake/output data recorded.    Past Cardiology Tests (Last 3 Years):  EKG:  No results found for this or any previous visit from the past 1095 days.    Echo:  No results found for this or any previous visit from the past 1095 days.    Ejection Fractions:  No results found for: \"EF\"  Echo 9/24/24:     Left Ventricle: Moderately reduced left ventricular systolic function.   EF by visual approximation is 42%. Left ventricle is dilated. Normal wall   thickness. Moderate global hypokinesis present.     Right Ventricle: Right ventricle is mildly dilated. Low normal systolic   function. TAPSE is normal. Global hypokinesis present.     Aortic Valve: Trileaflet valve. Severely calcified right and   noncoronary cusps. Mild regurgitation. Severe stenosis of the aortic   valve. AV mean gradient is 49 mmHg. AV area by continuity VTI is 0.6 cm2.     Mitral Valve: Mild annular calcification at the posterior leaflet.    Increased E-point septal separation suggesting decreased forward cardiac   output. " Moderate regurgitation.     Tricuspid Valve: Mild regurgitation. The estimated RVSP is 58 mmHg.   Moderately elevated RVSP, consistent with moderate pulmonary hypertension.   The estimated RVSP is 58 mmHg.     Left Atrium: Left atrium is mildly dilated.     Pericardium: Trivial/physiologic pericardial effusion present. No   indication of cardiac tamponade.     Image quality is technically difficult. Contrast used: Definity.   Technically difficult study with poor endocardial visualization and   technically difficult study due to patient's body habitus.   Cath:  No results found for this or any previous visit from the past 1095 days.    Stress Test:  No results found for this or any previous visit from the past 1095 days.    Cardiac Imaging:  No results found for this or any previous visit from the past 1095 days.      Past Medical History:  She has a past medical history of Acute bronchitis due to Rhinovirus (10/26/2024), Anemia, Aneurysm (CMS-[facility]), Anxiety, Arrhythmia, Atrial fibrillation (Multi) (10/26/2024), C. difficile colitis, Chronic kidney disease, Chronic respiratory failure, COPD (chronic obstructive pulmonary disease) (Multi), Coronary artery disease, Depression, Diabetes mellitus (Multi), ESRD (end stage renal disease) on dialysis (Multi), Gallstones, Heart murmur, Hyperlipidemia, Hypertension, Lymphedema associated with obesity, Morbid obesity with BMI of 45.0-49.9, adult (Multi), Neck pain, Pulmonary embolism, Pulmonary hypertension (Multi), and Sleep apnea.    Past Surgical History:  She has a past surgical history that includes  section, classic; Vascular surgery (2021); Upper gastrointestinal endoscopy (2021); AV fistula, brachiocephalic (Left, 2022); Cardiac catheterization (); Upper gastrointestinal endoscopy (2023); IVC filter retrieval (2023); and Eye surgery (Bilateral, 2023).      Social History:  She reports that she quit smoking about 12 years  ago. Her smoking use included cigarettes. She has never used smokeless tobacco. She reports that she does not drink alcohol and does not use drugs.    Family History:  Family History   Problem Relation Name Age of Onset    Coronary artery disease Mother      Coronary artery disease Father      Stroke Father      Coronary artery disease Brother          Allergies:  Morphine, Tessalon [benzonatate], Bleach (sodium hypochlorite), and Penicillins    Inpatient Medications:  Scheduled medications   Medication Dose Route Frequency    amiodarone  200 mg oral BID    apixaban  5 mg oral BID    aspirin  81 mg oral Daily    atorvastatin  40 mg oral q AM    calcium acetate  1,334 mg oral TID    insulin glargine  6 Units subcutaneous q AM    insulin lispro  0-5 Units subcutaneous TID    levoFLOXacin  250 mg oral Daily    midodrine  5 mg oral TID    predniSONE  40 mg oral Daily    Followed by    [START ON 10/31/2024] predniSONE  30 mg oral Daily    Followed by    [START ON 11/3/2024] predniSONE  20 mg oral Daily    Followed by    [START ON 11/6/2024] predniSONE  10 mg oral Daily     PRN medications   Medication    acetaminophen    dextrose    dextrose    glucagon    glucagon    guaiFENesin    melatonin    polyethylene glycol     Continuous Medications   Medication Dose Last Rate     Outpatient Medications:  Current Outpatient Medications   Medication Instructions    aspirin 81 mg, oral, Daily    atorvastatin (LIPITOR) 40 mg, oral, Every morning    Basaglar KwikPen U-100 Insulin 12 Units, subcutaneous, Every morning, Take as directed per insulin instructions.    calcium acetate (PHOSLO) 1,334 mg, oral, 3 times daily (morning, midday, late afternoon), With meals    insulin aspart, with niacinamide, (Fiasp FlexTouch U-100 Insulin) 100 unit/mL (3 mL) injection 12 Units, subcutaneous, 3 times daily (morning, midday, late afternoon), Take as directed per insulin instructions before meals    midodrine (Proamatine) 5 mg tablet Take 1  tablet prior to dialysis on Tuesday, Thursday and Saturday        Physical Exam:  General: NAD, lying in bed  Skin: warm and dry   Head/ neck: no JVD seen at 90 degrees  Cardiac: RRR, S1, S2   Pulm: decreased bilaterally, 4LNC   GI: soft, nontender   Extremities: +2 pitting LE edema   Neuro: no focal neuro deficits   Psych: appropriate mood and behavior        Assessment/Plan   Faviola Pleitez is a 69 y.o. female with Hx of ESRD-HD, CAD with previous stent, lymphedema, anemia of chronic disease,  chronic respiratory failure (on 3LNC at home), MO, DM, HLP, HTN, recently diagnosed cardiomyopathy presenting from [facility] for evaluation of severe aortic valve stenosis. Also, diagnosed with rhinovirus/bronchitis and new onset afib.     Severe AS  - echo  - CT TAVR without contrast  -  consult    Newly diagnosed CM, type unknown by echo 9/24/24  Acute systolic and diastolic heart failure, decompensated  - echo at OSH  9/27/24 EF 42%. Left Ventricle: Moderately reduced left ventricular systolic function. Moderate global hypokinesis present. Aortic Valve: Trileaflet valve. Severely calcified right and noncoronary cusps. Mild regurgitation. Severe stenosis of the aortic valve. AV mean gradient is 49 mmHg. AV area by continuity VTI is 0.6 cm2. Mitral Valve: Mild annular calcification at the posterior leaflet.  Increased E-point septal separation suggesting decreased forward cardiac output. Moderate regurgitation. Tricuspid Valve: Mild regurgitation. The estimated RVSP is 58 mmHg. Moderately elevated RVSP, consistent with moderate pulmonary hypertension.   Left atrium is mildly dilated.   - BNP 1284  - admit CXR: Cardiomegaly and interstitial pulmonary edema. Correlate with  volume status. Cannot exclude trace bibasilar pleural effusion.    Newly diagnosed afib 10/25/24   - seen by cardiology at OSH, started on amio and eliquis, will continue   - afib rate controlled on tele  - EKG    Chronic respiratory failure    Rhinovirus/Bronchitis  - Baseline 3L NC at home, 4L inpatient  - Continue levaquin for 3 more days per OSH rec  - Continue prednisone taper per OSH recs  - Continue dulera per OSH recs  - Continue guaifenesin PRN per OSH rec        CAD  - followed with her cardiologist in [facility].  - Patient admitted with CP, found to have AS   - 2015 non-STEMI and underwent successful coronary artery intervention with a DIANA to her LAD but unfortunately had diagonal ostium jailed with >70% stenosis.   - continue ASA, statin   - historically not on BB due to COPD  - Will need ischemic eval due to CP and new onset CM       ESRD on HD T,TH, Sat via left arm fistula  - 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  - consult nephrology      Anemia of chronic disease  - Hgb 11.2 on admit     Acute Cellulitis   Hx lymphedema  - continue levaquin for 3 more day  - recently completed metronidazole course  - wound care consult     DM  - Glucose well controlled inpatient  - at OSH had poorly controlled BS due to prednisone and home lantus and meal time insulin increased  - Will lower lantus and give SS #1 inpatient      Code Status:  Full Code    I spent 45 minutes in the professional and overall care of this patient.    To be staffed with Dr. Bang Vargas, APRN-CNP    I conducted a shared care visit with the SONY, patient has known aortic stenosis previousy but not felt to be severe, she is now transferred with concern of severe stenosis and possible need for TAVR, seems to be poor candiate for surgery given ESRD and hypotension will obtain angiogram and echocardiogram here, appears volume overloaded but not sure how much volume we can remove

## 2024-10-28 NOTE — Clinical Note
Angioplasty of the left main lesion. Inflation 1: Pressure = 10 dameon; Duration = 10 sec. Neck , no lymphadenopathy

## 2024-10-28 NOTE — Clinical Note
Truedil balloon inserted, advanced to aortic valve.   Pacer on 180bpm 10mA. Balloon inflated, deflated. Pacer off to backup 40bpm 10mA . Baloon removed

## 2024-10-28 NOTE — Clinical Note
The PACEMAKER SYSTEM, MICRA [id number], WITH INTRODUCER - [id number] device was inserted. The leads were placed into the connector and visually verified to be in correct position. Injury current obtained.

## 2024-10-28 NOTE — Clinical Note
Angioplasty of the left anterior descending lesion. Inflation 1: Pressure = 10 dameon; Duration = 40 sec. Inflation 2: Pressure = 10 dameon; Duration = 40 sec.

## 2024-10-28 NOTE — Clinical Note
Pacer on 180 bpm 10mA balloon inflated. Balloon deflated. Pacer returned to backup 40 bpm 10 mA balloon removed.

## 2024-10-28 NOTE — Clinical Note
14 fr lashayt removed RFA over safari. Delivery system for evolut 29 inserted. Advanced to aortic valve.

## 2024-10-28 NOTE — Clinical Note
Inflation 1: Pressure = 22 dameon; Duration = 5 sec. Inflation 2: Pressure = 22 dameon; Duration = 10 sec.

## 2024-10-28 NOTE — Clinical Note
Angioplasty of the left anterior descending lesion. Inflation 1: Pressure = 12 dameon; Duration = 5 sec. Inflation 2: Pressure = 18 dameon; Duration = 5 sec.

## 2024-10-28 NOTE — CARE PLAN
Problem: Respiratory  Goal: Minimal/no exertional discomfort or dyspnea this shift  Outcome: Progressing     The patient's goals for the shift include  understand what will be happening at  and estimated length of stay.    The clinical goals for the shift include patients pulse ox to be greater than 88%    Over the shift, the patient did not make progress toward the following goals n/a. Barriers to progression include none. Recommendations to address these barriers include none.

## 2024-10-28 NOTE — SIGNIFICANT EVENT
Rapid Response Nurse Note: RADAR alert: 6    Pager time: [phone]  Arrival time: 920  Event end time: 945  Location: T5  [] Triage by phone or secure messaging    Rapid response initiated by:  [] Rapid response RN [] Family [] Nursing Supervisor [] Physician   [x] RADAR auto page [] Sepsis auto-page [] RN [] RT   [] NP/PA [] Other:     Primary reason for call:   [] BAT [] New CPAP/BiPAP [] Bleeding [] Change in mental status   [] Chest pain [] Code blue [] FiO2 >/= 50% [] HR </= 40 bpm   [] HR >/= 130 bpm [] Hyperglycemia [] Hypoglycemia [] RADAR    [] RR </= 8 bpm [] RR >/= 30 bpm [] SBP </= 90 mmHg [] SpO2 < 90%   [] Seizure [] Sepsis [] Shorness of breath  [] Staff concern: see comments     Initial VS and/or RADAR VS: T 36.7 °C; HR 67; RR 19; BP 89/45; SPO2 95%.  Providers present at bedside (if applicable):     Interventions:  [x] None [] ABG/VBG [] Assist w/ICU transfer [] BAT paged    [] Bag mask [] Blood [] Cardioversion [] Code Blue   [] Code blue for intubation [] Code status changed [] Chest x-ray [] EKG   [] IV fluid/bolus [] KUB x-ray [] Labs/cultures [] Medication   [] Nebulizer treatment [] NIPPV (CPAP/BiPAP) [] Oxygen [] Oral airway   [] Peripheral IV [] Palliative care consult [] CT/MRI [] Sepsis protocol    [] Suctioned [] Other:     Name of ICU Provider contacted (if applicable):   Outcome:  [] Coded and  [] Code blue for intubation [] Coded and transferred to ICU []  on division   [x] Remained on division (no change) [] Remained on division + additional monitoring [] Remained in ED [] Transferred to ED   [] Transferred to ICU [] Transferred to inpatient status [] Transferred for interventions (procedure) [] Transferred to ICU stepdown    [] Transferred to surgery [] Transferred to telemetry [] Sepsis protocol [] STEMI protocol   [] Stroke protocol [x] Bedside nurse instructed to page rapid response for any concerns or acute change in condition/VS     Additional Comments: RADAR alert-  SBP in the 80's. Upon arrival pt sitting up in the bedside chair- states she is tired from the ambulance ride to  this am- new admit for HF and TAVR work up. Unable to utilize L arm so recheck on R arm and upper arm 86/54 and lower arm 96/63. Pt appears asymptomatic sitting in chair and does not endorse any lightheadness or dizziness. Primary team will be by to see pt soon and updated with bedside nurse and will call with any concerns.

## 2024-10-28 NOTE — Clinical Note
Atherectomy performed in the left anterior descending. Atherectomy device inserted. Pass 1 rate = 60 RPM. Pass 1 duration = 10 seconds. Pass 2 rate = 60 RPM. Pass 2 duration = 10 seconds. For multiple passes

## 2024-10-28 NOTE — Clinical Note
Patient Clipped and Prepped: left neck. Prepped with Betadine and draped in sterile fashion. In prep for sheath exchange to 13 fr. Straight dialysis catheter 20 cm

## 2024-10-28 NOTE — Clinical Note
Swapped pressors to groin line  6 fr. And planning for left neck jugular  line change to dialysis catheter , then will perclose

## 2024-10-29 ENCOUNTER — APPOINTMENT (OUTPATIENT)
Dept: CARDIOLOGY | Facility: HOSPITAL | Age: 69
End: 2024-10-29
Payer: MEDICARE

## 2024-10-29 LAB
AORTIC VALVE MEAN GRADIENT: 38.4 MMHG
AORTIC VALVE PEAK VELOCITY: 4 M/S
AV PEAK GRADIENT: 64 MMHG
AVA (VTI): 0.74 CM2
EJECTION FRACTION APICAL 4 CHAMBER: 42.8
EJECTION FRACTION: 31 %
GLUCOSE BLD MANUAL STRIP-MCNC: 132 MG/DL (ref 74–99)
GLUCOSE BLD MANUAL STRIP-MCNC: 142 MG/DL (ref 74–99)
GLUCOSE BLD MANUAL STRIP-MCNC: 190 MG/DL (ref 74–99)
GLUCOSE BLD MANUAL STRIP-MCNC: 261 MG/DL (ref 74–99)
GLUCOSE BLD MANUAL STRIP-MCNC: 318 MG/DL (ref 74–99)
HBV SURFACE AB SER-ACNC: 344 MIU/ML
HBV SURFACE AG SERPL QL IA: NONREACTIVE
LEFT ATRIUM VOLUME AREA LENGTH INDEX BSA: 41.6 ML/M2
LEFT VENTRICLE INTERNAL DIMENSION DIASTOLE: 5.26 CM (ref 3.5–6)
LEFT VENTRICULAR OUTFLOW TRACT DIAMETER: 2.21 CM
MITRAL VALVE E/A RATIO: 3.13
RIGHT VENTRICLE FREE WALL PEAK S': 4 CM/S
RIGHT VENTRICLE PEAK SYSTOLIC PRESSURE: 60.9 MMHG
UFH PPP CHRO-ACNC: 2 IU/ML

## 2024-10-29 PROCEDURE — 1200000002 HC GENERAL ROOM WITH TELEMETRY DAILY

## 2024-10-29 PROCEDURE — 36415 COLL VENOUS BLD VENIPUNCTURE: CPT | Performed by: INTERNAL MEDICINE

## 2024-10-29 PROCEDURE — 2500000004 HC RX 250 GENERAL PHARMACY W/ HCPCS (ALT 636 FOR OP/ED)

## 2024-10-29 PROCEDURE — 99233 SBSQ HOSP IP/OBS HIGH 50: CPT | Performed by: INTERNAL MEDICINE

## 2024-10-29 PROCEDURE — 85520 HEPARIN ASSAY: CPT | Performed by: NURSE PRACTITIONER

## 2024-10-29 PROCEDURE — 93306 TTE W/DOPPLER COMPLETE: CPT

## 2024-10-29 PROCEDURE — 2500000005 HC RX 250 GENERAL PHARMACY W/O HCPCS

## 2024-10-29 PROCEDURE — 2500000001 HC RX 250 WO HCPCS SELF ADMINISTERED DRUGS (ALT 637 FOR MEDICARE OP): Performed by: NURSE PRACTITIONER

## 2024-10-29 PROCEDURE — 5A1D70Z PERFORMANCE OF URINARY FILTRATION, INTERMITTENT, LESS THAN 6 HOURS PER DAY: ICD-10-PCS | Performed by: INTERNAL MEDICINE

## 2024-10-29 PROCEDURE — 99221 1ST HOSP IP/OBS SF/LOW 40: CPT | Performed by: INTERNAL MEDICINE

## 2024-10-29 PROCEDURE — 93306 TTE W/DOPPLER COMPLETE: CPT | Performed by: INTERNAL MEDICINE

## 2024-10-29 PROCEDURE — 36415 COLL VENOUS BLD VENIPUNCTURE: CPT | Performed by: NURSE PRACTITIONER

## 2024-10-29 PROCEDURE — 87340 HEPATITIS B SURFACE AG IA: CPT | Performed by: INTERNAL MEDICINE

## 2024-10-29 PROCEDURE — 90935 HEMODIALYSIS ONE EVALUATION: CPT | Performed by: INTERNAL MEDICINE

## 2024-10-29 PROCEDURE — 2500000002 HC RX 250 W HCPCS SELF ADMINISTERED DRUGS (ALT 637 FOR MEDICARE OP, ALT 636 FOR OP/ED): Performed by: NURSE PRACTITIONER

## 2024-10-29 PROCEDURE — 8010000001 HC DIALYSIS - HEMODIALYSIS PER DAY

## 2024-10-29 PROCEDURE — 70355 PANORAMIC X-RAY OF JAWS: CPT | Performed by: STUDENT IN AN ORGANIZED HEALTH CARE EDUCATION/TRAINING PROGRAM

## 2024-10-29 PROCEDURE — 86706 HEP B SURFACE ANTIBODY: CPT | Performed by: INTERNAL MEDICINE

## 2024-10-29 PROCEDURE — 82947 ASSAY GLUCOSE BLOOD QUANT: CPT

## 2024-10-29 PROCEDURE — 2500000004 HC RX 250 GENERAL PHARMACY W/ HCPCS (ALT 636 FOR OP/ED): Performed by: NURSE PRACTITIONER

## 2024-10-29 RX ORDER — DEXTROSE 50 % IN WATER (D50W) INTRAVENOUS SYRINGE
25
Status: DISCONTINUED | OUTPATIENT
Start: 2024-10-29 | End: 2024-10-29

## 2024-10-29 RX ORDER — DEXTROSE 50 % IN WATER (D50W) INTRAVENOUS SYRINGE
12.5
Status: DISCONTINUED | OUTPATIENT
Start: 2024-10-29 | End: 2024-10-29

## 2024-10-29 RX ORDER — HEPARIN SODIUM 10000 [USP'U]/100ML
0-4500 INJECTION, SOLUTION INTRAVENOUS CONTINUOUS
Status: DISCONTINUED | OUTPATIENT
Start: 2024-10-29 | End: 2024-11-05

## 2024-10-29 RX ORDER — INSULIN GLARGINE 100 [IU]/ML
1-125 INJECTION, SOLUTION SUBCUTANEOUS DAILY
Status: DISCONTINUED | OUTPATIENT
Start: 2024-10-30 | End: 2024-10-30

## 2024-10-29 RX ORDER — INSULIN LISPRO 100 [IU]/ML
0-125 INJECTION, SOLUTION INTRAVENOUS; SUBCUTANEOUS AS NEEDED
Status: DISCONTINUED | OUTPATIENT
Start: 2024-10-29 | End: 2024-10-29

## 2024-10-29 RX ORDER — INSULIN LISPRO 100 [IU]/ML
0-125 INJECTION, SOLUTION INTRAVENOUS; SUBCUTANEOUS
Status: DISCONTINUED | OUTPATIENT
Start: 2024-10-29 | End: 2024-10-30

## 2024-10-29 RX ORDER — INSULIN LISPRO 100 [IU]/ML
0-15 INJECTION, SOLUTION INTRAVENOUS; SUBCUTANEOUS
Status: DISCONTINUED | OUTPATIENT
Start: 2024-10-29 | End: 2024-10-29

## 2024-10-29 RX ORDER — INSULIN GLARGINE 100 [IU]/ML
1-125 INJECTION, SOLUTION SUBCUTANEOUS DAILY
Status: DISCONTINUED | OUTPATIENT
Start: 2024-10-30 | End: 2024-10-29

## 2024-10-29 RX ORDER — GLUCAGON 1 MG/ML
1 KIT INJECTION
Status: DISCONTINUED | OUTPATIENT
Start: 2024-10-29 | End: 2024-11-10 | Stop reason: HOSPADM

## 2024-10-29 RX ORDER — INSULIN LISPRO 100 [IU]/ML
0-125 INJECTION, SOLUTION INTRAVENOUS; SUBCUTANEOUS
Status: DISCONTINUED | OUTPATIENT
Start: 2024-10-29 | End: 2024-10-29

## 2024-10-29 RX ORDER — INSULIN GLARGINE 100 [IU]/ML
12 INJECTION, SOLUTION SUBCUTANEOUS EVERY MORNING
Status: DISCONTINUED | OUTPATIENT
Start: 2024-10-30 | End: 2024-10-29

## 2024-10-29 RX ORDER — DEXTROSE 50 % IN WATER (D50W) INTRAVENOUS SYRINGE
10-50
Status: DISCONTINUED | OUTPATIENT
Start: 2024-10-29 | End: 2024-11-10 | Stop reason: HOSPADM

## 2024-10-29 RX ORDER — INSULIN LISPRO 100 [IU]/ML
0-125 INJECTION, SOLUTION INTRAVENOUS; SUBCUTANEOUS AS NEEDED
Status: DISCONTINUED | OUTPATIENT
Start: 2024-10-29 | End: 2024-10-30

## 2024-10-29 ASSESSMENT — COGNITIVE AND FUNCTIONAL STATUS - GENERAL
DRESSING REGULAR UPPER BODY CLOTHING: A LITTLE
MOBILITY SCORE: 19
TOILETING: A LOT
MOBILITY SCORE: 19
MOVING TO AND FROM BED TO CHAIR: A LITTLE
DRESSING REGULAR UPPER BODY CLOTHING: A LITTLE
WALKING IN HOSPITAL ROOM: A LITTLE
CLIMB 3 TO 5 STEPS WITH RAILING: A LOT
HELP NEEDED FOR BATHING: A LITTLE
HELP NEEDED FOR BATHING: A LITTLE
TOILETING: A LOT
WALKING IN HOSPITAL ROOM: A LITTLE
PERSONAL GROOMING: A LITTLE
STANDING UP FROM CHAIR USING ARMS: A LITTLE
CLIMB 3 TO 5 STEPS WITH RAILING: A LOT
STANDING UP FROM CHAIR USING ARMS: A LITTLE
DAILY ACTIVITIY SCORE: 18
MOVING TO AND FROM BED TO CHAIR: A LITTLE
DAILY ACTIVITIY SCORE: 18
DRESSING REGULAR LOWER BODY CLOTHING: A LITTLE
DRESSING REGULAR LOWER BODY CLOTHING: A LITTLE
PERSONAL GROOMING: A LITTLE

## 2024-10-29 ASSESSMENT — PAIN SCALES - GENERAL
PAINLEVEL_OUTOF10: 0 - NO PAIN

## 2024-10-29 ASSESSMENT — ENCOUNTER SYMPTOMS
ENDOCRINE NEGATIVE: 1
CARDIOVASCULAR NEGATIVE: 1
NEUROLOGICAL NEGATIVE: 1
MUSCULOSKELETAL NEGATIVE: 1
EYES NEGATIVE: 1
FATIGUE: 1
SHORTNESS OF BREATH: 1
GASTROINTESTINAL NEGATIVE: 1

## 2024-10-29 ASSESSMENT — PAIN - FUNCTIONAL ASSESSMENT
PAIN_FUNCTIONAL_ASSESSMENT: 0-10

## 2024-10-29 NOTE — SIGNIFICANT EVENT
.Rapid Response Nurse Note: DORCAS alert: 7    Pager time: [phone]  Arrival time: [id number]  Event end time:   Location: T5  [x] Triage by secure messaging    Rapid response initiated by:  [] Rapid response RN [] Family [] Nursing Supervisor [] Physician   [x] RADAR auto page [] Sepsis auto-page [] RN [] RT   [] NP/PA [] Other:     Primary reason for call:   [] BAT [] New CPAP/BiPAP [] Bleeding [] Change in mental status   [] Chest pain [] Code blue [] FiO2 >/= 50% [] HR </= 40 bpm   [] HR >/= 130 bpm [] Hyperglycemia [] Hypoglycemia [x] RADAR    [] RR </= 8 bpm [] RR >/= 30 bpm [] SBP </= 90 mmHg [] SpO2 < 90%   [] Seizure [] Sepsis [] Shorness of breath  [] Staff concern: see comments     Interventions:  [x] None [] ABG/VBG [] Assist w/ICU transfer [] BAT paged    [] Bag mask [] Blood [] Cardioversion [] Code Blue   [] Code blue for intubation [] Code status changed [] Chest x-ray [] EKG   [] IV fluid/bolus [] KUB x-ray [] Labs/cultures [] Medication   [] Nebulizer treatment [] NIPPV (CPAP/BiPAP) [] Oxygen [] Oral airway   [] Peripheral IV [] Palliative care consult [] CT/MRI [] Sepsis protocol    [] Suctioned [] Other:     Outcome:  [] Coded and  [] Code blue for intubation [] Coded and transferred to ICU []  on division   [x] Remained on division (no change) [] Remained on division + additional monitoring [] Remained in ED [] Transferred to ED   [] Transferred to ICU [] Transferred to inpatient status [] Transferred for interventions (procedure) [] Transferred to ICU stepdown    [] Transferred to surgery [] Transferred to telemetry [] Sepsis protocol [] STEMI protocol   [] Stroke protocol       Additional Comments: DORCAS page received: 35.8 [phone]/62 93%.  No concerns per nurse at this time; encouraged to call with changes.

## 2024-10-29 NOTE — PROGRESS NOTES
Subjective Data:  Patient tearful, anxious about possible surgery. Coughing and aware to try to eat smaller bites slower to prevent aspiration. Afib on tele 80's.     Overnight Events:    NA      Objective Data:  Last Recorded Vitals:  Vitals:    10/29/24 0828 10/29/24 1111 10/29/24 1158 10/29/24 1224   BP: 147/84  115/62    BP Location: Right arm  Right arm    Patient Position: Sitting  Lying    Pulse: 88  82 59   Resp: 20  22    Temp: 35.7 °C (96.3 °F)  35.8 °C (96.4 °F) 35.8 °C (96.4 °F)   TempSrc: Temporal  Temporal Temporal   SpO2: 97% 100% 93%    Weight:       Height:           Last Labs:  CBC - 10/28/2024:  9:50 AM  14.3 11.2 125    36.1      CMP - 10/28/2024:  9:50 AM  8.5 6.2 22 --- 0.5   _ 3.7 14 94      PTT - 10/28/2024:  9:50 AM  1.6   18.6 27     BNP   Date/Time Value Ref Range Status   10/28/2024 09:50 AM 1,284 0 - 99 pg/mL Final     HGBA1C   Date/Time Value Ref Range Status   08/07/2024 03:16 AM 7.0 4.0 - 5.6 % Final   01/16/2024 04:33 PM 7.4 4.0 - 5.6 % Final      Last I/O:  I/O last 3 completed shifts:  In: 300 (2.5 mL/kg) [P.O.:300]  Out: 0 (0 mL/kg)   Weight: 119.2 kg     Past Cardiology Tests (Last 3 Years):  EKG:  ECG 12 lead 10/28/2024 (Preliminary)    Echo:  Transthoracic Echo (TTE) Complete 10/29/2024    Ejection Fractions:  EF   Date/Time Value Ref Range Status   10/29/2024 12:00 PM 31 %      Cath:  No results found for this or any previous visit from the past 1095 days.    Stress Test:  No results found for this or any previous visit from the past 1095 days.    Cardiac Imaging:  No results found for this or any previous visit from the past 1095 days.      Inpatient Medications:  Scheduled medications   Medication Dose Route Frequency    amiodarone  200 mg oral BID    [Held by provider] apixaban  5 mg oral BID    aspirin  81 mg oral Daily    atorvastatin  40 mg oral q AM    calcium acetate  1,334 mg oral TID    fluticasone furoate-vilanteroL  1 puff inhalation Daily    [START ON 10/30/2024]  Glucommander - insulin glargine  1-125 Units subcutaneous Daily    And    Glucommander - insulin lispro  0-125 Units subcutaneous With meals & nightly    levoFLOXacin  250 mg oral Daily    midodrine  5 mg oral TID    pantoprazole  40 mg oral Daily before breakfast    predniSONE  40 mg oral Daily    Followed by    [START ON 10/31/2024] predniSONE  30 mg oral Daily    Followed by    [START ON 11/1/2024] predniSONE  20 mg oral Daily    Followed by    [START ON 11/2/2024] predniSONE  10 mg oral Daily    sennosides-docusate sodium  1 tablet oral Nightly     PRN medications   Medication    acetaminophen    dextrose    glucagon    glucagon    glucagon HCL    Glucommander - insulin lispro    guaiFENesin    heparin    melatonin    ondansetron    polyethylene glycol     Continuous Medications   Medication Dose Last Rate    heparin  0-4,500 Units/hr         Physical Exam:  General: NAD, sitting in chair   Skin: warm and dry   Head/ neck: no JVD seen at 90 degrees  Cardiac: RRR, S1, S2   Pulm: crackles bilaterally, 4LNC   GI: soft, nontender   Extremities: +2 pitting LE edema   Neuro: no focal neuro deficits   Psych: tearful     Assessment/Plan   Faviola Pleitez is a 69 y.o. female with Hx of ESRD-HD, CAD with previous stent, lymphedema, anemia of chronic disease,  chronic respiratory failure (on 3LNC at home), MO, DM, HLP, HTN, recently diagnosed cardiomyopathy presenting from [facility] for evaluation of severe aortic valve stenosis. Also, diagnosed with rhinovirus/bronchitis and new onset afib.      Severe AS  - TTE 10/29 EF 31%. global hypokinesis of the left ventricle with minor regional variations. Left ventricular diastolic filling was indeterminate. reduced right ventricular systolic function. LA mildly dilated. Mod MAC. Mild to mod TR, mod elevated RSVP. Severe calcific AS with gradients of 64/38.4mmHg and DI of 0.19 and mild AI. ( Note the gradients and VTI were averaged over 5 consecutive beats given atrial  fibrillation ) consistent with severe AS. severe aortic valve cusp calcification. Mild aortic valve regurgitation.  - CT TAVR without contrast 10/29  Mild calcified atherosclerotic changes involving the thoracoabdominal aorta and its branches. Please note that, the assessment of vascular patency is not possible in absence of  intravenous contrast. Severe calcifications of the aortic valve correlating with history  of aortic stenosis. Moderate coronary artery calcifications, with suggestion of  underlying stents. Please note,the study is not optimized for evaluation of coronary arteries.   Multiple solid non-calcified pulmonary nodules measuring up to 7 mm. In absence of prior comparative examinations, to ensure stability, consider follow up non contrast chest CT at 3-6 months,  then consider CT chest at 18-24 months. Heterogenous thyroid gland with nodules measuring up to 1.3 cm. Further evaluation with nonemergent thyroid ultrasound is recommended. Cholelithiasis without evidence of acute cholecystitis. Moderate-sized fat containing periumbilical hernia. Atrophic bilateral kidneys.  -  consult will see patient once imaging complete   - CT Surgery felt patient not SAVR candidate. Patient prefers TAVR.   - Panorex      Newly diagnosed CM, type unknown by echo 9/24/24  Acute systolic and diastolic heart failure, decompensated  - echo at OSH  9/27/24 EF 42%. Left Ventricle: Moderately reduced left ventricular systolic function. Moderate global hypokinesis present. Aortic Valve: Trileaflet valve. Severely calcified right and noncoronary cusps. Mild regurgitation. Severe stenosis of the aortic valve. AV mean gradient is 49 mmHg. AV area by continuity VTI is 0.6 cm2. Mitral Valve: Mild annular calcification at the posterior leaflet.  Increased E-point septal separation suggesting decreased forward cardiac output. Moderate regurgitation. Tricuspid Valve: Mild regurgitation. The estimated RVSP is 58 mmHg. Moderately  elevated RVSP, consistent with moderate pulmonary hypertension.   Left atrium is mildly dilated.   - BNP 1284  - admit CXR: Cardiomegaly and interstitial pulmonary edema. Correlate with  volume status. Cannot exclude trace bibasilar pleural effusion.  - volume management with HD   - TTE 10/29 EF 31%     Newly diagnosed afib 10/25/24   - seen by cardiology at OSH, started on amio and eliquis, will continue   - afib rate controlled on tele  - EKG  - 10/28 hold eliquis  - IV hep gtt (once nosebleed resolved)     Chronic respiratory failure   Rhinovirus/Bronchitis  - Baseline 3L NC at home, 4L inpatient  - Continue prednisone taper per OSH recs-adjusted inpatient for shorter course   - Continue dulera per OSH recs  - Continue guaifenesin PRN per OSH rec    - RT consult for chest therapy (unable to bring up secretions with cough)        CAD  - followed with her cardiologist in [facility].  - Patient admitted with CP, found to have AS   - 2015 non-STEMI and underwent successful coronary artery intervention with a DIANA to her LAD but unfortunately had diagonal ostium jailed with >70% stenosis.   - continue ASA, statin   - historically not on BB due to COPD  - Will need ischemic eval due to CP and new onset CM -->L/R heart cath 10/31 ordered. (On HD day, nephrology informed)NPO ordered.         ESRD on HD T,TH, Sat via left arm fistula  - 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  - consult nephrology, HD 10/29     Anemia of chronic disease  - Hgb 11.2 on admit      Acute Cellulitis   Hx lymphedema  - continue levaquin for 3 more day  - recently completed metronidazole course  - wound care consult awaiting recs     DM  - Glucose well controlled inpatient  - at OSH had poorly controlled BS due to prednisone and home lantus and meal time insulin increased  - Will lower lantus and give SS #1 inpatient  - Glucose elevated. Initiated glucommander      Dispo: pending eval for TAVR  Code Status:  Full Code  DVT ppx: IV hep gtt       I spent 45 minutes in the professional and overall care of this patient.     Seen and discussed with Dr. Raz Vargas, APRN-CNP  Peripheral IV 20 G Right Antecubital (Active)   Site Assessment Clean;Dry;Intact 10/29/24 0900   Dressing Status Dry;Clean 10/29/24 0900   Number of days: 1       Code Status:  Full Code    I conducted a shared care visit with the SON[facility], she remains short of breath today we will go for dialysis had plan to initiate heparin but she is having some nosebleeds so we will hold off for the moment discussed with surgery and structural team she is not likely to be a surgical candidate given numerous comorbidities echocardiogram shows drop in ejection fraction with severe aortic stenosis

## 2024-10-29 NOTE — PROGRESS NOTES
TCC went to assess patient. Patient currently receiving ultrasound; TCC will attempt at a later time.    MARYLOU Richard, RN  [facility]  Transitional Care Coordinator, Mon-Fri  Cell: [phone], Office: [phone]  Email: [email or web address]

## 2024-10-29 NOTE — CONSULTS
Reason for Consult: Aortic stenosis    CARDIAC SURGERY CONSULT NOTE    HISTORY OF PRESENT ILLNESS  Faviola Pleitez is a 69 y.o. female presenting from [facility] for evaluation of severe aortic valve stenosis. Hx of ESRD-HD, CAD with previous stent, lymphedema, anemia of chronic disease, chronic respiratory failure (on 3LNC at home), morbid obesity (BMI 45), DM, HLP, HTN.      The patient was seen by her PCP on 10/9/24 for admission 9/24-9/28 for infection in the diabetic wounds patient required antibiotics and metronidazole. She had elevated blood glucose levels since discharge in the 200s to 300s. Patient also with newly diagnosed cardiomyopathy with EF 42%, moderate global hypokinesis, aortic Trileaflet valve. Severely calcified right and noncoronary cusps. Mild regurgitation. Severe stenosis of the aortic valve. AV mean gradient is 49 mmHg. AV area by continuity VTI is 0.6 cm2. Mod MR, mild TR, RVSP 58mmHg. Cardiologist Dr. Molina was to follow for her severe AS. However, then the patient was admitted 10/22-10/28 [facility] for right shoulder and right upper chest discomfort she was subsequently diagnosed with a rhinovirus infection and new onset afib. It was recommended that the patient be transferred to [facility] for her valve and coronary evaluation.      Cardiac surgery and structural heart both consulted to determine the best treatment option for the patient's aortic stenosis.     The patient states she is mostly independent with her ADLs including dressing and bathing. She has needed to use a walker when ambulating for the past year which she states for weakness. She states her daughter helps with all of her IADLs including driving, cleaning, cooking etc.  Is on O2 at baseline all of the time.     Subjective   Past Medical History:   Diagnosis Date    Acute bronchitis due to Rhinovirus 10/26/2024    Anemia     due to ESRD    Aneurysm (CMS-[facility])     brain    Anxiety     Arrhythmia     Atrial  fibrillation (Multi) 10/26/2024    C. difficile colitis     Chronic kidney disease     Chronic respiratory failure     COPD (chronic obstructive pulmonary disease) (Multi)     Coronary artery disease     Depression     Diabetes mellitus (Multi)     ESRD (end stage renal disease) on dialysis (Multi)     Gallstones     Heart murmur     Hyperlipidemia     Hypertension     Lymphedema associated with obesity     Morbid obesity with BMI of 45.0-49.9, adult (Multi)     Neck pain     Pulmonary embolism     Pulmonary hypertension (Multi)     Sleep apnea      Past Surgical History:   Procedure Laterality Date    AV FISTULA PLACEMENT, BRACHIOCEPHALIC Left 2022    CARDIAC CATHETERIZATION  2015     SECTION, CLASSIC      EYE SURGERY Bilateral 2023    IVC FILTER RETRIEVAL  2023    NO MENTION OF RETRIEVAL    UPPER GASTROINTESTINAL ENDOSCOPY  2021    UPPER GASTROINTESTINAL ENDOSCOPY  2023    VASCULAR SURGERY  2021     Social History     Tobacco Use    Smoking status: Former     Current packs/day: 0.00     Types: Cigarettes     Quit date:      Years since quittin.8    Smokeless tobacco: Never   Substance Use Topics    Alcohol use: Never    Drug use: Never     Family History   Problem Relation Name Age of Onset    Coronary artery disease Mother      Coronary artery disease Father      Stroke Father      Coronary artery disease Brother         Morphine, Tessalon [benzonatate], Bleach (sodium hypochlorite), and Penicillins    Prior to Admission medications    Medication Sig Start Date End Date Taking? Authorizing Provider   aspirin 81 mg EC tablet Take 1 tablet (81 mg) by mouth once daily.    Historical Provider, MD   atorvastatin (Lipitor) 40 mg tablet Take 1 tablet (40 mg) by mouth once daily in the morning.    Historical Provider, MD   calcium acetate (Phoslo) 667 mg capsule Take 2 capsules (1,334 mg) by mouth 3 times daily (morning, midday, late afternoon). With meals     "Historical Provider, MD   insulin aspart, with niacinamide, (Fiasp FlexTouch U-100 Insulin) 100 unit/mL (3 mL) injection Inject 12 Units under the skin 3 times daily (morning, midday, late afternoon). Take as directed per insulin instructions before meals    Historical Provider, MD   insulin glargine (Basaglar KwikPen U-100 Insulin) 100 unit/mL (3 mL) pen Inject 12 Units under the skin once daily in the morning. Take as directed per insulin instructions.    Historical Provider, MD   midodrine (Proamatine) 5 mg tablet Take 1 tablet prior to dialysis on Tuesday, Thursday and Saturday    Historical Provider, MD       Review of Systems  Review of Systems   Constitutional:  Positive for fatigue.   HENT: Negative.     Eyes: Negative.    Respiratory:  Positive for shortness of breath.    Cardiovascular: Negative.    Gastrointestinal: Negative.    Endocrine: Negative.    Genitourinary: Negative.    Musculoskeletal: Negative.    Neurological: Negative.            Objective   /84   Pulse 88   Temp 35.7 °C (96.3 °F)   Resp 20   Ht 1.626 m (5' 4\")   Wt 119 kg (262 lb 12.6 oz)   SpO2 100%   BMI 45.11 kg/m²   0-10 (Numeric) Pain Score: 0 - No pain   Vitals:    10/29/24 0447   Weight: 119 kg (262 lb 12.6 oz)          Intake/Output Summary (Last 24 hours) at 10/29/2024 1125  Last data filed at 10/29/2024 0900  Gross per 24 hour   Intake 420 ml   Output 0 ml   Net 420 ml       Physical Exam  Physical Exam  Constitutional:       General: She is not in acute distress.     Appearance: She is obese. She is ill-appearing. She is not toxic-appearing.   HENT:      Head: Normocephalic.      Mouth/Throat:      Mouth: Mucous membranes are moist.   Cardiovascular:      Rate and Rhythm: Normal rate and regular rhythm.      Heart sounds: Murmur heard.   Pulmonary:      Comments: On supplemental oxygen   Musculoskeletal:         General: Normal range of motion.      Cervical back: Neck supple.      Right lower leg: Edema present.     "  Left lower leg: Edema present.   Skin:     General: Skin is warm and dry.   Neurological:      General: No focal deficit present.      Mental Status: She is alert and oriented to person, place, and time.   Psychiatric:         Mood and Affect: Mood normal.         Behavior: Behavior normal.         Medications  Scheduled medications  amiodarone, 200 mg, oral, BID  [Held by provider] apixaban, 5 mg, oral, BID  aspirin, 81 mg, oral, Daily  atorvastatin, 40 mg, oral, q AM  calcium acetate, 1,334 mg, oral, TID  fluticasone furoate-vilanteroL, 1 puff, inhalation, Daily  insulin glargine, 6 Units, subcutaneous, q AM  insulin lispro, 0-5 Units, subcutaneous, TID  levoFLOXacin, 250 mg, oral, Daily  midodrine, 5 mg, oral, TID  pantoprazole, 40 mg, oral, Daily before breakfast  predniSONE, 40 mg, oral, Daily   Followed by  [START ON 10/31/2024] predniSONE, 30 mg, oral, Daily   Followed by  [START ON 11/3/2024] predniSONE, 20 mg, oral, Daily   Followed by  [START ON 11/6/2024] predniSONE, 10 mg, oral, Daily  sennosides-docusate sodium, 1 tablet, oral, Nightly    Continuous medications   PRN medications  PRN medications: acetaminophen, dextrose, dextrose, glucagon, glucagon, guaiFENesin, melatonin, ondansetron, polyethylene glycol    Labs  Results for orders placed or performed during the hospital encounter of 10/28/24 (from the past 24 hours)   POCT GLUCOSE   Result Value Ref Range    POCT Glucose 64 (L) 74 - 99 mg/dL   POCT GLUCOSE   Result Value Ref Range    POCT Glucose 95 74 - 99 mg/dL   ECG 12 lead   Result Value Ref Range    Ventricular Rate 101 BPM    Atrial Rate 101 BPM    SC Interval 224 ms    QRS Duration 102 ms    QT Interval 350 ms    QTC Calculation(Bazett) 453 ms    R Axis -10 degrees    T Axis 166 degrees    QRS Count 17 beats    Q Onset 214 ms    T Offset 389 ms    QTC Fredericia 416 ms   POCT GLUCOSE   Result Value Ref Range    POCT Glucose 191 (H) 74 - 99 mg/dL   POCT GLUCOSE   Result Value Ref Range    POCT  Glucose 340 (H) 74 - 99 mg/dL   POCT GLUCOSE   Result Value Ref Range    POCT Glucose 261 (H) 74 - 99 mg/dL               ASSESSMENT & PLAN  Faviola Pleitez is a 69 y.o. female presenting from [facility] for evaluation of severe aortic valve stenosis. Hx of ESRD-HD, CAD with previous stent, lymphedema, anemia of chronic disease, chronic respiratory failure (on 3LNC at home), morbid obesity (BMI 45), DM, HLP, HTN.      The patient was seen by her PCP on 10/9/24 for admission 9/24-9/28 for infection in the diabetic wounds patient required antibiotics and metronidazole. She had elevated blood glucose levels since discharge in the 200s to 300s. Patient also with newly diagnosed cardiomyopathy with EF 42%, moderate global hypokinesis, aortic Trileaflet valve. Severely calcified right and noncoronary cusps. Mild regurgitation. Severe stenosis of the aortic valve. AV mean gradient is 49 mmHg. AV area by continuity VTI is 0.6 cm2. Mod MR, mild TR, RVSP 58mmHg. Cardiologist Dr. Molina was to follow for her severe AS. However, then the patient was admitted 10/22-10/28 [facility] for right shoulder and right upper chest discomfort she was subsequently diagnosed with a rhinovirus infection and new onset afib. It was recommended that the patient be transferred to [facility] for her valve and coronary evaluation.      Cardiac surgery and structural heart both consulted to determine the best treatment option for the patient's aortic stenosis.       The patient states she is mostly independent with her ADLs including dressing and bathing. She has needed to use a walker when ambulating for the past year which she states for weakness. She states her daughter helps with all of her IADLs including driving, cleaning, cooking etc.  Is on O2 at baseline all of the time.     RECOMMENDATIONS/PLAN  Dr. Chowdhury aware of patient and will review imaging and data. Once imaging and workup has been obtained, will discuss with structural heart  to determine best treatment option. Overall, given patient's medical comorbidities and functional status, she is not a good SAVR candidate. The patient also states she would not want cardiac surgery and would prefer a TAVR. Further recs pending imaging/workup obtained.   - Please obtain [facility] imaging.   - Formal TTE done 10/29 and report pending   - CT TAVR completed yesterday 10/28  - Patient will need a C       Will continue to follow along.  Thank you for the consultation.   Patient educated and all questions answered.  Please page the cardiac surgery consult pager [phone] with any questions or changes in patient condition.    Phoebe Scherer PA-C  Cardiac Surgery Consult [facility]  [facility]  Cardiac Surgery Consult Pager [phone]     10/29/2024  11:25 AM

## 2024-10-29 NOTE — CARE PLAN
The patient's goals for the shift include      The clinical goals for the shift include have a BM    Over the shift, the patient did  make progress toward the following goals. Barriers to progression include still some stool burden with nausea. Recommendations to address these barriers include continue laxatives.

## 2024-10-29 NOTE — CONSULTS
Reason For Consult  ESRD on HD    History Of Present Illness  Faviola Plietez is a 69 y.o. female  with h/o ESRD on HD TTS [facility]/Dr Woodruff. Last HD at out center 10/19 post weight 118.7, on Mircera 30mcg Q 4 weeks currently on HOLD. F 180, 3.75 hr, 400/500, , EUGENIE AVF 15 g needles admitted for evaluation for severe aortic stenosis. Also diagnosed with rhinovirus infection.  Seen on HD.  Has mild SoB, on 3 L NC at baseline. Tends to be as low as 90s/50s on HD as outpatient, asymptomatic. On Midodrine 3/day.   No other complaints     Past Medical History  She has a past medical history of Acute bronchitis due to Rhinovirus (10/26/2024), Anemia, Aneurysm (CMS-[facility]), Anxiety, Arrhythmia, Atrial fibrillation (Multi) (10/26/2024), C. difficile colitis, Chronic kidney disease, Chronic respiratory failure, COPD (chronic obstructive pulmonary disease) (Multi), Coronary artery disease, Depression, Diabetes mellitus (Multi), ESRD (end stage renal disease) on dialysis (Multi), Gallstones, Heart murmur, Hyperlipidemia, Hypertension, Lymphedema associated with obesity, Morbid obesity with BMI of 45.0-49.9, adult (Multi), Neck pain, Pulmonary embolism, Pulmonary hypertension (Multi), and Sleep apnea.    Surgical History  She has a past surgical history that includes  section, classic; Vascular surgery (2021); Upper gastrointestinal endoscopy (2021); AV fistula, brachiocephalic (Left, 2022); Cardiac catheterization (); Upper gastrointestinal endoscopy (2023); IVC filter retrieval (2023); and Eye surgery (Bilateral, 2023).     Social History  She reports that she quit smoking about 12 years ago. Her smoking use included cigarettes. She has never used smokeless tobacco. She reports that she does not drink alcohol and does not use drugs.    Family History  Family History   Problem Relation Name Age of Onset    Coronary artery disease Mother      Coronary artery  disease Father      Stroke Father      Coronary artery disease Brother          Allergies  Morphine, Tessalon [benzonatate], Bleach (sodium hypochlorite), and Penicillins    Review of Systems  RoS negative for all other systems except as noted above.    Physical Exam  No distress  HEENT:  moist, no pallor  1+ edema calvin LE  Breath sounds calvin equal, clear  S1 S2 regular, normal, no rub or murmur  Abdomen soft  AAO x3, non focal     I&O 24HR    Intake/Output Summary (Last 24 hours) at 10/29/2024 1443  Last data filed at 10/29/2024 1224  Gross per 24 hour   Intake 700 ml   Output 0 ml   Net 700 ml       Vitals 24HR  Heart Rate:  []   Temp:  [35.7 °C (96.3 °F)-36.6 °C (97.9 °F)]   Resp:  [20-22]   BP: ()/(62-84)   Weight:  [119 kg (262 lb 12.6 oz)]   SpO2:  [93 %-100 %]     amiodarone, 200 mg, oral, BID  [Held by provider] apixaban, 5 mg, oral, BID  aspirin, 81 mg, oral, Daily  atorvastatin, 40 mg, oral, q AM  calcium acetate, 1,334 mg, oral, TID  fluticasone furoate-vilanteroL, 1 puff, inhalation, Daily  [START ON 10/30/2024] Glucommander - insulin glargine, 1-125 Units, subcutaneous, Daily   And  Glucommander - insulin lispro, 0-125 Units, subcutaneous, With meals & nightly  levoFLOXacin, 250 mg, oral, Daily  midodrine, 5 mg, oral, TID  pantoprazole, 40 mg, oral, Daily before breakfast  predniSONE, 40 mg, oral, Daily   Followed by  [START ON 10/31/2024] predniSONE, 30 mg, oral, Daily   Followed by  [START ON 11/1/2024] predniSONE, 20 mg, oral, Daily   Followed by  [START ON 11/2/2024] predniSONE, 10 mg, oral, Daily  sennosides-docusate sodium, 1 tablet, oral, Nightly      Assessment/Plan     69 Year old with h/o ESRD on HD admitted for evaluation for TAVR vs SAVR  Nephrology following for ESRD    #ESRD: Tolerating HD per submitted orders, 2 K, 2.5 Ca, UF goal 2.5 L    # Anemia: HgB   Hemoglobin   Date Value Ref Range Status   10/28/2024 11.2 (L) 12.0 - 16.0 g/dL Final   continue to hold JOHAN    # MBD: Ca    Calcium   Date Value Ref Range Status   10/28/2024 8.5 (L) 8.6 - 10.6 mg/dL Final   continue Ca acetate and daily renal MVI.     #Hypertension: Bp control acceptable continue midodrine 5 mg PO TID    #Volume status: continue UF as tolerated    Will continue to follow

## 2024-10-29 NOTE — CARE PLAN
The patient's goals for the shift include      The clinical goals for the shift include Patient will show no signs or symptoms of respiratory distress this shift    Over the shift, the patient did not show any s/sx of respiratory distress this shift. Pulse ox stayed above 90%        Problem: Respiratory  Goal: Clear secretions with interventions this shift  Outcome: Progressing  Goal: Minimize anxiety/maximize coping throughout shift  Outcome: Progressing  Goal: Minimal/no exertional discomfort or dyspnea this shift  Outcome: Progressing  Goal: No signs of respiratory distress (eg. Use of accessory muscles. Peds grunting)  Outcome: Progressing  Goal: Patent airway maintained this shift  Outcome: Progressing  Goal: Tolerate mechanical ventilation evidenced by VS/agitation level this shift  Outcome: Progressing  Goal: Tolerate pulmonary toileting this shift  Outcome: Progressing  Goal: Verbalize decreased shortness of breath this shift  Outcome: Progressing  Goal: Wean oxygen to maintain O2 saturation per order/standard this shift  Outcome: Progressing  Goal: Increase self care and/or family involvement in next 24 hours  Outcome: Progressing     Problem: Fall/Injury  Goal: Not fall by end of shift  Outcome: Progressing  Goal: Be free from injury by end of the shift  Outcome: Progressing  Goal: Verbalize understanding of personal risk factors for fall in the hospital  Outcome: Progressing  Goal: Verbalize understanding of risk factor reduction measures to prevent injury from fall in the home  Outcome: Progressing  Goal: Use assistive devices by end of the shift  Outcome: Progressing  Goal: Pace activities to prevent fatigue by end of the shift  Outcome: Progressing     Problem: Pain - Adult  Goal: Verbalizes/displays adequate comfort level or baseline comfort level  Outcome: Progressing     Problem: Safety - Adult  Goal: Free from fall injury  Outcome: Progressing     Problem: Discharge Planning  Goal: Discharge to  home or other facility with appropriate resources  Outcome: Progressing     Problem: Chronic Conditions and Co-morbidities  Goal: Patient's chronic conditions and co-morbidity symptoms are monitored and maintained or improved  Outcome: Progressing

## 2024-10-29 NOTE — NURSING NOTE
Report from Sending RN:    Report From:  JENNIFER Lucero  Recent Surgery of Procedure: No  Baseline Level of Consciousness (LOC): A/O X 4  Oxygen Use: Yes, 3L sat 100%  Type: NC  Diabetic: Yes, last   Last BP Med Given Day of Dialysis: midodrine, BP support   Last Pain Med Given: none  Lab Tests to be Obtained with Dialysis: No  Blood Transfusion to be Given During Dialysis: No  Available IV Access: Yes  Medications to be Administered During Dialysis: No  Continuous IV Infusion Running: No  Restraints on Currently or in the Last 24 Hours: No  Hand-Off Communication: full code, droplet precautions, pt is weak today travel by bed  Dialysis Catheter Dressing: N/A pt has a fistula  Last Dressing Change: N/A

## 2024-10-29 NOTE — NURSING NOTE
Report to Receiving RN:    Report To: Jazmine RODRIGUEZ  Time Report Called: 5795  Hand-Off Communication:   Time Report Called: SBP maintained > 90, 1.4 liters of fluid removed, denies C/O  Complications During Treatment: Yes, hypotension  Ultrafiltration Treatment: No  Medications Administered During Dialysis: Yes, midodrine 5 mg po  Blood Products Administered During Dialysis: No  Labs Sent During Dialysis: No  Heparin Drip Rate Changes: N/A  Dialysis Catheter Dressing: N/A  Last Dressing Change: N/A

## 2024-10-29 NOTE — CONSULTS
Inpatient consult to Structural Heart/TAVR  Consult performed by: Rui Prakash MD  Consult ordered by: Nunu Vargas, SHARONDA-CNP        History Of Present Illness:    Faviola Pleitez is a 69 y.o. female with PMHx of ESRD-HD, CAD with previous stent, lymphedema, anemia of chronic disease, obesity, chronic respiratory failure (on 3LNC at home), MO, DM, HLP, HTN and recently diagnosed severe aortic stenosis with moderately reduced EF. She presented to [facility] because of right and right upper chest discomfort and was diagnosed with rhinovirus infection and new onset Afib. She was transferred to [facility] for further evaluation of severe aortic stenosis.    Patient relates a few weeks history of worsening fatigue, SOB, OLIVEIRA.  Denies overt orthopnea, PND but endorses exertional CP.  Has occasional dizziness.  Denies syncope or presyncope. Endorse leg edema.  Gradually doing less and less activity secondary to symptoms. She lives at home but is mostly sedentary due to mobility issues. However, she still reports severe impairment in daily activities due to her shortness of breath.    ROS:  Constitutional: fatigue  Eyes: no acute eye problems, no blurred vision, no diplopia, no eye pain  ENT: no nosebleeds, no acute hearing loss, no earache, no sore throat  Cardiovascular: dyspnea on exertion, occasional exertional chest pain  Respiratory: no chronic cough, not coughing up sputum, no wheezing that is consistent with asthma  Gastrointestinal: no acute bowel complaints  Musculoskeletal: no acute arthralgias, no acute myalgias, no acute joint swelling  Skin: no skin rashes, no change in skin color and pigmentation, no skin lesions and no skin lumps.   Neurological: no headaches, no dizziness, no tingling, no fainting and no limb weakness.   Psychiatric:  no suicidal ideation, no confusion, no personality change and no emotional problems.   Hematologic/Lymphatic: no bleeding issues.  ROS complete and negative except as noted  above.     Past Medical History:  She has a past medical history of Acute bronchitis due to Rhinovirus (10/26/2024), Anemia, Aneurysm (CMS-[facility]), Anxiety, Arrhythmia, Atrial fibrillation (Multi) (10/26/2024), C. difficile colitis, Chronic kidney disease, Chronic respiratory failure, COPD (chronic obstructive pulmonary disease) (Multi), Coronary artery disease, Depression, Diabetes mellitus (Multi), ESRD (end stage renal disease) on dialysis (Multi), Gallstones, Heart murmur, Hyperlipidemia, Hypertension, Lymphedema associated with obesity, Morbid obesity with BMI of 45.0-49.9, adult (Multi), Neck pain, Pulmonary embolism, Pulmonary hypertension (Multi), and Sleep apnea.    Past Surgical History:  She has a past surgical history that includes  section, classic; Vascular surgery (2021); Upper gastrointestinal endoscopy (2021); AV fistula, brachiocephalic (Left, 2022); Cardiac catheterization (); Upper gastrointestinal endoscopy (2023); IVC filter retrieval (2023); and Eye surgery (Bilateral, 2023).      Social History:  She reports that she quit smoking about 12 years ago. Her smoking use included cigarettes. She has never used smokeless tobacco. She reports that she does not drink alcohol and does not use drugs.     Allergies:  Morphine, Tessalon [benzonatate], Bleach (sodium hypochlorite), and Penicillins    Outpatient Medications:  Current Outpatient Medications   Medication Instructions    aspirin 81 mg, oral, Daily    atorvastatin (LIPITOR) 40 mg, oral, Every morning    Basaglar KwikPen U-100 Insulin 12 Units, subcutaneous, Every morning, Take as directed per insulin instructions.    calcium acetate (PHOSLO) 1,334 mg, oral, 3 times daily (morning, midday, late afternoon), With meals    insulin aspart, with niacinamide, (Fiasp FlexTouch U-100 Insulin) 100 unit/mL (3 mL) injection 12 Units, subcutaneous, 3 times daily (morning, midday, late afternoon), Take as directed  per insulin instructions before meals    midodrine (Proamatine) 5 mg tablet Take 1 tablet prior to dialysis on Tuesday, Thursday and Saturday        TAVR Workup:   - NYHA: III  - Frailty: 2/5   - EKG:  Afib,  msec.  - TTE: EF 31%, severe aortic stenosis, AV gradient 64/39, Vmax 4, RIAN 0.7, DI 0.19, moderate MAC, mild AI  - CT TAVR: done  - LHC: pending  - dental clearance:  done  - STS  Procedure Type: Isolated AVR  Perioperative Outcome Estimate %  Operative Mortality 38.1%  Morbidity & Mortality 56.1%  Stroke 2.3%  Renal Failure NA  Reoperation 6.59%  Prolonged Ventilation 54%  Deep Sternal Wound Infection 0.728%  Long Hospital Stay (>14 days) 49.2%  Short Hospital Stay (<6 days)* 3.48%         KCCQ Questionnaire      1  Heart failure affects different people in different ways. Some feel shortness of breath while others feel fatigue. Please indicate how much you are limited by heart failure (shortness of breath or fatigue) in your ability to do the following activities over the past 2 weeks. 1 month Structural Heart NP follow-up     A.) Showering/bathing  4. Slightly  B.) Walking 1 block on level ground 4. Slightly  C.) Hurrying or Jogging   1. Extremely    2.  Over the past 2 weeks, how many times did you have swelling in your feet, ankles or legs when you woke up in the morning? 1. Every morning    3.  Over the past 2 weeks, on average, how many times has fatigue limited your ability to do what you wanted? 3. At least once a day    4.  Over the past 2 weeks, on average, how many times has shortness of breath limited your ability to do what you wanted? 2. Several times a day    5.  Over the past 2 weeks, on average, how many times have you been forced to sleep sitting up in a chair or with at least 3 pillows to prop you up because of shortness of breath? Every night    6. Over the past 2 weeks, how much has your heart failure limited your enjoyment of life? It has moderately limited my enjoyment of  life    7. If you had to spend the rest of your life with your heart failure the way it is right now, how would you feel about this? 1. Not at all     8. How much does your heart failure affect your lifestyle? Please indicate how your heart failure may have limited yourparticipation in the following activities over the past 2 weeks    A.)  Hobbies, recreational activities  3. Moderately limited    B.) Working or doing household chores  4. Slightly limited    C.) Visiting family or friends out of your home  5. Did not limit at all                        Physical Exam:  Constitutional: alert and in no acute distress.   Eyes: no erythema, swelling or discharge from the eye .   ENT: no erythema, edema, exudate or lesions .   Neck: neck is supple, no JVD  Pulmonary: no increased work of breathing or signs of respiratory distress , decreased lung sounds at the bases  Cardiovascular: RRR, 3/6 JAVON RUSB,  pitting edema, non-displaced PMI, no S3 or S4  Abdomen: abdomen non-tender, no masses  and no hepatomegaly .   Neurologic: non-focal neurologic examination.       Last Recorded Vitals:  Vitals:    10/29/24 1111 10/29/24 1158 10/29/24 1224 10/29/24 1600   BP:  115/62     BP Location:  Right arm     Patient Position:  Lying     Pulse:  82 59 66   Resp:  22     Temp:  35.8 °C (96.4 °F) 35.8 °C (96.4 °F) 36 °C (96.8 °F)   TempSrc:  Temporal Temporal Temporal   SpO2: 100% 93%     Weight:       Height:           Last Labs:  CBC - 10/28/2024:  9:50 AM  14.3 11.2 125    36.1      BMP  135  96  71                  ----------------<93     4.7  22  6.02     CMP - 10/28/2024:  9:50 AM  8.5 6.2 22 --- 0.5   _ 3.7 14 94      PTT - 10/28/2024:  9:50 AM  1.6   18.6 27     BNP   Date/Time Value Ref Range Status   10/28/2024 09:50 AM 1,284 (H) 0 - 99 pg/mL Final        Last I/O:  I/O last 3 completed shifts:  In: 300 (2.5 mL/kg) [P.O.:300]  Out: 0 (0 mL/kg)   Weight: 119.2 kg     Ejection Fractions:  EF   Date/Time Value Ref Range Status    10/29/2024 12:00 PM 31 %        Inpatient Medications:  Scheduled medications   Medication Dose Route Frequency    amiodarone  200 mg oral BID    [Held by provider] apixaban  5 mg oral BID    aspirin  81 mg oral Daily    atorvastatin  40 mg oral q AM    calcium acetate  1,334 mg oral TID    fluticasone furoate-vilanteroL  1 puff inhalation Daily    [START ON 10/30/2024] Glucommander - insulin glargine  1-125 Units subcutaneous Daily    And    Glucommander - insulin lispro  0-125 Units subcutaneous With meals & nightly    levoFLOXacin  250 mg oral Daily    midodrine  5 mg oral TID    pantoprazole  40 mg oral Daily before breakfast    predniSONE  40 mg oral Daily    Followed by    [START ON 10/31/2024] predniSONE  30 mg oral Daily    Followed by    [START ON 11/1/2024] predniSONE  20 mg oral Daily    Followed by    [START ON 11/2/2024] predniSONE  10 mg oral Daily    sennosides-docusate sodium  1 tablet oral Nightly     PRN medications   Medication    acetaminophen    dextrose    glucagon    glucagon    glucagon HCL    Glucommander - insulin lispro    guaiFENesin    heparin    melatonin    ondansetron    polyethylene glycol     Continuous Medications   Medication Dose Last Rate    heparin  0-4,500 Units/hr 2,000 Units/hr (10/29/24 1718)       Impression:  Faviola Pleitez is a 69 y.o. female with PMHx of ESRD-HD, CAD with previous stent, lymphedema, anemia of chronic disease, obesity, chronic respiratory failure (on 3LNC at home), MO, DM, HLP, HTN and recently diagnosed severe aortic stenosis with moderately reduced EF. She presented to [facility] because of right and right upper chest discomfort and was diagnosed with rhinovirus infection and new onset Afib. She was transferred to [facility] for further evaluation of severe symptomatic aortic stenosis.    Plan:  Needs [facility]  Discussion about ICD for primary prevention  Inpatient TAVR    The overall decision regarding the best treatment for this condition is complex.  We  discussed options of both surgical aortic valve replacement and transcatheter aortic valve replacement along with risks and benefits involved with both of them in detail.    We discussed all the risks associated with the procedure, including but not limited to stroke, MI, pericardial tamponade, vascular complications, infection and death were discussed with the patient. The risk of needing a permament pacemaker was also discussed in detail. The patient verbalized understanding and decided to proceed with the procedure.     We will discuss this patient's case at our Valve Team meeting with representatives from Structural Heart and Cardiac Surgery on Monday. Our nurse navigators will contact patient with further diagnostic needs and formal plan.    Rui Prakash MD

## 2024-10-29 NOTE — CONSULTS
Wound Care Consult     Visit Date: 10/29/2024      Patient Name: Faviola Pleitez         MRN: [id number]           YOB: 1955     Reason for Consult: BLE wounds        Wound History: 69 y.o. female with Hx of ESRD-HD, CAD with previous stent, lymphedema, anemia of chronic disease, chronic respiratory failure (on 3LNC at home), MO, DM, HLP, HTN, recently diagnosed cardiomyopathy presenting from [facility] for evaluation of severe aortic valve stenosis.      Pertinent Labs:   Albumin   Date Value Ref Range Status   10/28/2024 3.7 3.4 - 5.0 g/dL Final       Wound Assessment:  Wound 10/29/24 Other (comment) Dorsal foot;Left (Active)   Wound Image   10/29/24 1744   Site Assessment Dry;Yellow 10/29/24 1744   Shape round 10/29/24 1744   Wound Length (cm) 2 cm 10/29/24 1744   Wound Width (cm) 1 cm 10/29/24 1744   Wound Surface Area (cm^2) 2 cm^2 10/29/24 1744   Wound Depth (cm) 0 cm 10/29/24 1744   Wound Volume (cm^3) 0 cm^3 10/29/24 1744   State of Healing Non-healing;Slough 10/29/24 1744   Margins Poorly defined 10/29/24 1744   Treatments Cleansed;Site care 10/29/24 1744   Drainage Description None 10/29/24 1744   Drainage Amount None 10/29/24 1744   Dressing Wound gel;Silicone border dressing 10/29/24 1744   Dressing Changed New 10/29/24 1744   Dressing Status Clean;Dry 10/29/24 1744       Wound 10/29/24 Venous Ulcer Pretibial Right (Active)   Wound Image   10/29/24 1758   Site Assessment Dry;Fragile;Red;Purple 10/29/24 1758   Non-staged Wound Description Partial thickness 10/29/24 1758   Shape irregular 10/29/24 1758   Wound Length (cm) 2 cm 10/29/24 1758   Wound Width (cm) 1.5 cm 10/29/24 1758   Wound Surface Area (cm^2) 3 cm^2 10/29/24 1758   Wound Depth (cm) 0.1 cm 10/29/24 1758   Wound Volume (cm^3) 0.3 cm^3 10/29/24 1758   State of Healing Non-healing 10/29/24 1758   Margins Well-defined edges 10/29/24 1758   Drainage Description Serous 10/29/24 1758   Drainage Amount Scant 10/29/24 1758   Dressing  Non adherent;ABD;Kerlix/rolled gauze 10/29/24 1758   Dressing Changed New 10/29/24 1758   Dressing Status Dry;Clean 10/29/24 1758       Wound Team Summary Assessment: The wound care team came to bedside to assess the patient left great toe and RLE venous ulcers.  The patient RLE have hemosiderin staining, fragile tissue with several dry open wounds.  The RLE was cleansed with vashe wound cleanser and gently pat dry.  Mepilex transfer was applied over the fragile tissue and wounds and covered with an ABD.  The leg was then wrapped with kerlix and ACE wraps from the base of the toes to below the knee. The patient has dry yellow crusted wound on the left great hallux. The wound stated as a blister that ruptured.  The wound was cleansed with vashe wound cleanser and gently pat dry.  Medihoney gel was applied to the yellow dry crusted dead tissue and covered with a mepilex lite dressing.  The left leg does not have any visible wounds and was cleansed with vashe and lotion was applied.  The leg was wrapped with kerlix rolled gauze and ACE wraps.      Wound Team Plan: Recommendations: Daily   LLE: Cleanse the left great hallux wound was vashe wound cleanser and gently pat dry   Apply medihoney gel to the dry yellow dead tissue and cover with a mepilex lite (# [id number]) or dry dressing   Wash the left leg with bath wipes or soap and water. Then apply lotion   Wrap the leg from the base of the toes with kerlix rolled gauze and ACE wraps     RLE: Change every other day  Cleanse the right leg and wounds with vashe wound cleanser and gently pat dry   Apply mepilex transfer (#[id number]) to the wounds and cover with an ABD pad  Wrap the leg from the base of the toes with kerlix rolled gauze and ACE wraps     Please encourage the patient to elevate her legs as often as possible     JENNIFER Canada  10/29/2024  6:19 PM

## 2024-10-30 LAB
ALBUMIN SERPL BCP-MCNC: 3.6 G/DL (ref 3.4–5)
ALBUMIN SERPL BCP-MCNC: 3.7 G/DL (ref 3.4–5)
ANION GAP SERPL CALC-SCNC: 19 MMOL/L (ref 10–20)
ANION GAP SERPL CALC-SCNC: 19 MMOL/L (ref 10–20)
ANION GAP SERPL CALC-SCNC: 20 MMOL/L (ref 10–20)
ANION GAP SERPL CALC-SCNC: 25 MMOL/L (ref 10–20)
BUN SERPL-MCNC: 53 MG/DL (ref 6–23)
BUN SERPL-MCNC: 62 MG/DL (ref 6–23)
BUN SERPL-MCNC: 71 MG/DL (ref 6–23)
BUN SERPL-MCNC: 71 MG/DL (ref 6–23)
CALCIUM SERPL-MCNC: 8.6 MG/DL (ref 8.6–10.6)
CALCIUM SERPL-MCNC: 8.6 MG/DL (ref 8.6–10.6)
CALCIUM SERPL-MCNC: 8.9 MG/DL (ref 8.6–10.6)
CALCIUM SERPL-MCNC: 9.1 MG/DL (ref 8.6–10.6)
CHLORIDE SERPL-SCNC: 92 MMOL/L (ref 98–107)
CHLORIDE SERPL-SCNC: 92 MMOL/L (ref 98–107)
CHLORIDE SERPL-SCNC: 94 MMOL/L (ref 98–107)
CHLORIDE SERPL-SCNC: 99 MMOL/L (ref 98–107)
CO2 SERPL-SCNC: 15 MMOL/L (ref 21–32)
CO2 SERPL-SCNC: 26 MMOL/L (ref 21–32)
CO2 SERPL-SCNC: 27 MMOL/L (ref 21–32)
CO2 SERPL-SCNC: 27 MMOL/L (ref 21–32)
CREAT SERPL-MCNC: 4.62 MG/DL (ref 0.5–1.05)
CREAT SERPL-MCNC: 5.31 MG/DL (ref 0.5–1.05)
CREAT SERPL-MCNC: 5.5 MG/DL (ref 0.5–1.05)
CREAT SERPL-MCNC: 6.02 MG/DL (ref 0.5–1.05)
EGFRCR SERPLBLD CKD-EPI 2021: 10 ML/MIN/1.73M*2
EGFRCR SERPLBLD CKD-EPI 2021: 7 ML/MIN/1.73M*2
EGFRCR SERPLBLD CKD-EPI 2021: 8 ML/MIN/1.73M*2
EGFRCR SERPLBLD CKD-EPI 2021: 8 ML/MIN/1.73M*2
ERYTHROCYTE [DISTWIDTH] IN BLOOD BY AUTOMATED COUNT: 17.1 % (ref 11.5–14.5)
GLUCOSE BLD MANUAL STRIP-MCNC: 118 MG/DL (ref 74–99)
GLUCOSE BLD MANUAL STRIP-MCNC: 124 MG/DL (ref 74–99)
GLUCOSE BLD MANUAL STRIP-MCNC: 130 MG/DL (ref 74–99)
GLUCOSE BLD MANUAL STRIP-MCNC: 205 MG/DL (ref 74–99)
GLUCOSE BLD MANUAL STRIP-MCNC: 270 MG/DL (ref 74–99)
GLUCOSE BLD MANUAL STRIP-MCNC: 349 MG/DL (ref 74–99)
GLUCOSE SERPL-MCNC: 107 MG/DL (ref 74–99)
GLUCOSE SERPL-MCNC: 197 MG/DL (ref 74–99)
GLUCOSE SERPL-MCNC: 259 MG/DL (ref 74–99)
GLUCOSE SERPL-MCNC: 92 MG/DL (ref 74–99)
HCT VFR BLD AUTO: 36.8 % (ref 36–46)
HGB BLD-MCNC: 12 G/DL (ref 12–16)
MCH RBC QN AUTO: 32.8 PG (ref 26–34)
MCHC RBC AUTO-ENTMCNC: 32.6 G/DL (ref 32–36)
MCV RBC AUTO: 101 FL (ref 80–100)
NRBC BLD-RTO: 2.7 /100 WBCS (ref 0–0)
PHOSPHATE SERPL-MCNC: 4.1 MG/DL (ref 2.5–4.9)
PHOSPHATE SERPL-MCNC: 4.2 MG/DL (ref 2.5–4.9)
PHOSPHATE SERPL-MCNC: 4.3 MG/DL (ref 2.5–4.9)
PHOSPHATE SERPL-MCNC: 4.5 MG/DL (ref 2.5–4.9)
PLATELET # BLD AUTO: 147 X10*3/UL (ref 150–450)
POTASSIUM SERPL-SCNC: 5 MMOL/L (ref 3.5–5.3)
POTASSIUM SERPL-SCNC: 5.8 MMOL/L (ref 3.5–5.3)
POTASSIUM SERPL-SCNC: 5.8 MMOL/L (ref 3.5–5.3)
POTASSIUM SERPL-SCNC: 6.3 MMOL/L (ref 3.5–5.3)
RBC # BLD AUTO: 3.66 X10*6/UL (ref 4–5.2)
SODIUM SERPL-SCNC: 132 MMOL/L (ref 136–145)
SODIUM SERPL-SCNC: 132 MMOL/L (ref 136–145)
SODIUM SERPL-SCNC: 134 MMOL/L (ref 136–145)
SODIUM SERPL-SCNC: 134 MMOL/L (ref 136–145)
UFH PPP CHRO-ACNC: 0.8 IU/ML
UFH PPP CHRO-ACNC: 1.1 IU/ML
UFH PPP CHRO-ACNC: 1.4 IU/ML
UFH PPP CHRO-ACNC: 1.7 IU/ML
UFH PPP CHRO-ACNC: 1.9 IU/ML
WBC # BLD AUTO: 12.7 X10*3/UL (ref 4.4–11.3)

## 2024-10-30 PROCEDURE — 82947 ASSAY GLUCOSE BLOOD QUANT: CPT

## 2024-10-30 PROCEDURE — 94640 AIRWAY INHALATION TREATMENT: CPT

## 2024-10-30 PROCEDURE — 36415 COLL VENOUS BLD VENIPUNCTURE: CPT | Performed by: NURSE PRACTITIONER

## 2024-10-30 PROCEDURE — 2500000001 HC RX 250 WO HCPCS SELF ADMINISTERED DRUGS (ALT 637 FOR MEDICARE OP)

## 2024-10-30 PROCEDURE — 2500000005 HC RX 250 GENERAL PHARMACY W/O HCPCS: Performed by: STUDENT IN AN ORGANIZED HEALTH CARE EDUCATION/TRAINING PROGRAM

## 2024-10-30 PROCEDURE — 2500000004 HC RX 250 GENERAL PHARMACY W/ HCPCS (ALT 636 FOR OP/ED): Performed by: NURSE PRACTITIONER

## 2024-10-30 PROCEDURE — 2500000001 HC RX 250 WO HCPCS SELF ADMINISTERED DRUGS (ALT 637 FOR MEDICARE OP): Performed by: NURSE PRACTITIONER

## 2024-10-30 PROCEDURE — 80069 RENAL FUNCTION PANEL: CPT

## 2024-10-30 PROCEDURE — 2500000002 HC RX 250 W HCPCS SELF ADMINISTERED DRUGS (ALT 637 FOR MEDICARE OP, ALT 636 FOR OP/ED): Performed by: STUDENT IN AN ORGANIZED HEALTH CARE EDUCATION/TRAINING PROGRAM

## 2024-10-30 PROCEDURE — 80069 RENAL FUNCTION PANEL: CPT | Performed by: STUDENT IN AN ORGANIZED HEALTH CARE EDUCATION/TRAINING PROGRAM

## 2024-10-30 PROCEDURE — 1200000002 HC GENERAL ROOM WITH TELEMETRY DAILY

## 2024-10-30 PROCEDURE — 2500000002 HC RX 250 W HCPCS SELF ADMINISTERED DRUGS (ALT 637 FOR MEDICARE OP, ALT 636 FOR OP/ED)

## 2024-10-30 PROCEDURE — 99233 SBSQ HOSP IP/OBS HIGH 50: CPT | Performed by: NURSE PRACTITIONER

## 2024-10-30 PROCEDURE — 85520 HEPARIN ASSAY: CPT | Performed by: NURSE PRACTITIONER

## 2024-10-30 PROCEDURE — 2500000002 HC RX 250 W HCPCS SELF ADMINISTERED DRUGS (ALT 637 FOR MEDICARE OP, ALT 636 FOR OP/ED): Performed by: NURSE PRACTITIONER

## 2024-10-30 PROCEDURE — 2500000005 HC RX 250 GENERAL PHARMACY W/O HCPCS

## 2024-10-30 PROCEDURE — 85027 COMPLETE CBC AUTOMATED: CPT

## 2024-10-30 PROCEDURE — 2500000004 HC RX 250 GENERAL PHARMACY W/ HCPCS (ALT 636 FOR OP/ED): Performed by: STUDENT IN AN ORGANIZED HEALTH CARE EDUCATION/TRAINING PROGRAM

## 2024-10-30 PROCEDURE — 93010 ELECTROCARDIOGRAM REPORT: CPT | Performed by: INTERNAL MEDICINE

## 2024-10-30 PROCEDURE — 99232 SBSQ HOSP IP/OBS MODERATE 35: CPT | Performed by: INTERNAL MEDICINE

## 2024-10-30 RX ORDER — DEXTROSE MONOHYDRATE 100 MG/ML
50 INJECTION, SOLUTION INTRAVENOUS CONTINUOUS
Status: DISCONTINUED | OUTPATIENT
Start: 2024-10-30 | End: 2024-10-31

## 2024-10-30 RX ORDER — INSULIN GLARGINE 100 [IU]/ML
1-125 INJECTION, SOLUTION SUBCUTANEOUS DAILY
Status: DISCONTINUED | OUTPATIENT
Start: 2024-10-31 | End: 2024-11-02

## 2024-10-30 RX ORDER — INSULIN GLARGINE 100 [IU]/ML
INJECTION, SOLUTION SUBCUTANEOUS ONCE
Status: COMPLETED | OUTPATIENT
Start: 2024-10-30 | End: 2024-10-30

## 2024-10-30 RX ORDER — INSULIN LISPRO 100 [IU]/ML
0-125 INJECTION, SOLUTION INTRAVENOUS; SUBCUTANEOUS
Status: DISCONTINUED | OUTPATIENT
Start: 2024-10-30 | End: 2024-11-02

## 2024-10-30 RX ORDER — AMIODARONE HYDROCHLORIDE 200 MG/1
200 TABLET ORAL DAILY
Status: DISCONTINUED | OUTPATIENT
Start: 2024-10-31 | End: 2024-11-10 | Stop reason: HOSPADM

## 2024-10-30 RX ORDER — DEXTROSE 50 % IN WATER (D50W) INTRAVENOUS SYRINGE
25 ONCE
Status: COMPLETED | OUTPATIENT
Start: 2024-10-30 | End: 2024-10-30

## 2024-10-30 RX ORDER — INSULIN LISPRO 100 [IU]/ML
0-125 INJECTION, SOLUTION INTRAVENOUS; SUBCUTANEOUS AS NEEDED
Status: DISCONTINUED | OUTPATIENT
Start: 2024-10-30 | End: 2024-11-02

## 2024-10-30 ASSESSMENT — COGNITIVE AND FUNCTIONAL STATUS - GENERAL
WALKING IN HOSPITAL ROOM: A LITTLE
STANDING UP FROM CHAIR USING ARMS: A LITTLE
TOILETING: A LOT
DRESSING REGULAR UPPER BODY CLOTHING: A LITTLE
DRESSING REGULAR UPPER BODY CLOTHING: A LITTLE
DRESSING REGULAR LOWER BODY CLOTHING: A LITTLE
HELP NEEDED FOR BATHING: A LITTLE
MOVING TO AND FROM BED TO CHAIR: A LITTLE
CLIMB 3 TO 5 STEPS WITH RAILING: A LOT
WALKING IN HOSPITAL ROOM: A LITTLE
MOBILITY SCORE: 17
DAILY ACTIVITIY SCORE: 18
TURNING FROM BACK TO SIDE WHILE IN FLAT BAD: A LITTLE
TOILETING: A LITTLE
DAILY ACTIVITIY SCORE: 19
MOVING TO AND FROM BED TO CHAIR: A LITTLE
HELP NEEDED FOR BATHING: A LITTLE
STANDING UP FROM CHAIR USING ARMS: A LITTLE
PERSONAL GROOMING: A LITTLE
DRESSING REGULAR LOWER BODY CLOTHING: A LITTLE
MOBILITY SCORE: 17
MOVING FROM LYING ON BACK TO SITTING ON SIDE OF FLAT BED WITH BEDRAILS: A LITTLE
CLIMB 3 TO 5 STEPS WITH RAILING: A LOT
PERSONAL GROOMING: A LITTLE
TURNING FROM BACK TO SIDE WHILE IN FLAT BAD: A LITTLE
MOVING FROM LYING ON BACK TO SITTING ON SIDE OF FLAT BED WITH BEDRAILS: A LITTLE

## 2024-10-30 ASSESSMENT — ACTIVITIES OF DAILY LIVING (ADL): LACK_OF_TRANSPORTATION: NO

## 2024-10-30 ASSESSMENT — PAIN - FUNCTIONAL ASSESSMENT
PAIN_FUNCTIONAL_ASSESSMENT: 0-10

## 2024-10-30 ASSESSMENT — PAIN SCALES - GENERAL
PAINLEVEL_OUTOF10: 0 - NO PAIN
PAINLEVEL_OUTOF10: 9
PAINLEVEL_OUTOF10: 0 - NO PAIN
PAINLEVEL_OUTOF10: 0 - NO PAIN

## 2024-10-30 ASSESSMENT — PAIN DESCRIPTION - LOCATION: LOCATION: FOOT

## 2024-10-30 NOTE — CARE PLAN
The patient's goals for the shift include      The clinical goals for the shift include heparin therapeutic    Over the shift, the patient did not make progress toward the following goals. Barriers to progression include supratherapeutic assay. Recommendations to address these barriers include monitor every 4 hrs.

## 2024-10-30 NOTE — PROGRESS NOTES
Interval events:    No acute events overnight  Subjective:  Patient notes feeling much better today, attributing yesterday's symptoms to anxiety. She also notes improvement in her nausea. While eating breakfast this morning, she was able to tolerate small bites.     Today in brief:  - plan for L/RHC tomorrow AM, HD scheduled for the afternoon  - amiodarone reduced to 200mg daily  - Panorex revealed multifocal dental caries. No periapical lucency to suggest abscess  - wound care recs ordered    Objective:  Vitals:    10/30/24 1139   BP: 99/50   Pulse: 71   Resp: 20   Temp: 36.3 °C (97.3 °F)   SpO2: 97%     Weight         10/28/2024  0829 10/28/2024  1118 10/29/2024  0447 10/30/2024  0447    Weight: 119 kg (262 lb 12.6 oz) 119 kg (262 lb 12.6 oz) 119 kg (262 lb 12.6 oz) 120 kg (264 lb 1.8 oz)            Intake/Output Summary (Last 24 hours) at 10/30/2024 1454  Last data filed at 10/30/2024 1200  Gross per 24 hour   Intake 1607.24 ml   Output 1828 ml   Net -220.76 ml     Recent Results (from the past 24 hours)   POCT GLUCOSE    Collection Time: 10/29/24  5:21 PM   Result Value Ref Range    POCT Glucose 142 (H) 74 - 99 mg/dL   POCT GLUCOSE    Collection Time: 10/29/24  6:21 PM   Result Value Ref Range    POCT Glucose 190 (H) 74 - 99 mg/dL   POCT GLUCOSE    Collection Time: 10/29/24 10:00 PM   Result Value Ref Range    POCT Glucose 132 (H) 74 - 99 mg/dL   Heparin Assay, UFH    Collection Time: 10/29/24 10:39 PM   Result Value Ref Range    Heparin Unfractionated 2.0 (HH) See Comment Below for Therapeutic Ranges IU/mL   Heparin Assay, UFH    Collection Time: 10/30/24 12:39 AM   Result Value Ref Range    Heparin Unfractionated 1.7 (HH) See Comment Below for Therapeutic Ranges IU/mL   Heparin Assay, UFH    Collection Time: 10/30/24  6:18 AM   Result Value Ref Range    Heparin Unfractionated 1.9 (HH) See Comment Below for Therapeutic Ranges IU/mL   POCT GLUCOSE    Collection Time: 10/30/24  6:28 AM   Result Value Ref Range     POCT Glucose 130 (H) 74 - 99 mg/dL   POCT GLUCOSE    Collection Time: 10/30/24  7:47 AM   Result Value Ref Range    POCT Glucose 118 (H) 74 - 99 mg/dL   CBC    Collection Time: 10/30/24  9:57 AM   Result Value Ref Range    WBC 12.7 (H) 4.4 - 11.3 x10*3/uL    nRBC 2.7 (H) 0.0 - 0.0 /100 WBCs    RBC 3.66 (L) 4.00 - 5.20 x10*6/uL    Hemoglobin 12.0 12.0 - 16.0 g/dL    Hematocrit 36.8 36.0 - 46.0 %     (H) 80 - 100 fL    MCH 32.8 26.0 - 34.0 pg    MCHC 32.6 32.0 - 36.0 g/dL    RDW 17.1 (H) 11.5 - 14.5 %    Platelets 147 (L) 150 - 450 x10*3/uL   Heparin Assay, UFH    Collection Time: 10/30/24  9:57 AM   Result Value Ref Range    Heparin Unfractionated 1.4 (HH) See Comment Below for Therapeutic Ranges IU/mL   Renal function panel    Collection Time: 10/30/24 10:05 AM   Result Value Ref Range    Glucose 107 (H) 74 - 99 mg/dL    Sodium 134 (L) 136 - 145 mmol/L    Potassium 5.8 (H) 3.5 - 5.3 mmol/L    Chloride 94 (L) 98 - 107 mmol/L    Bicarbonate 27 21 - 32 mmol/L    Anion Gap 19 10 - 20 mmol/L    Urea Nitrogen 53 (H) 6 - 23 mg/dL    Creatinine 4.62 (H) 0.50 - 1.05 mg/dL    eGFR 10 (L) >60 mL/min/1.73m*2    Calcium 8.6 8.6 - 10.6 mg/dL    Phosphorus 4.2 2.5 - 4.9 mg/dL    Albumin 3.6 3.4 - 5.0 g/dL   POCT GLUCOSE    Collection Time: 10/30/24 11:49 AM   Result Value Ref Range    POCT Glucose 124 (H) 74 - 99 mg/dL   Heparin Assay, UFH    Collection Time: 10/30/24 12:37 PM   Result Value Ref Range    Heparin Unfractionated 0.8 See Comment Below for Therapeutic Ranges IU/mL     Inpatient Medications:  Scheduled medications   Medication Dose Route Frequency    [START ON 10/31/2024] amiodarone  200 mg oral Daily    [Held by provider] apixaban  5 mg oral BID    aspirin  81 mg oral Daily    atorvastatin  40 mg oral q AM    calcium acetate  1,334 mg oral TID    fluticasone furoate-vilanteroL  1 puff inhalation Daily    [START ON 10/31/2024] Glucommander - insulin glargine  1-125 Units subcutaneous Daily    And    Glucommander  - insulin lispro  0-125 Units subcutaneous With meals & nightly    levoFLOXacin  250 mg oral Daily    midodrine  5 mg oral TID    pantoprazole  40 mg oral Daily before breakfast    [START ON 10/31/2024] predniSONE  30 mg oral Daily    Followed by    [START ON 11/1/2024] predniSONE  20 mg oral Daily    Followed by    [START ON 11/2/2024] predniSONE  10 mg oral Daily    sennosides-docusate sodium  1 tablet oral Nightly    vitamin B complex-vitamin C-folic acid  1 capsule oral Daily     PRN medications   Medication    acetaminophen    dextrose    glucagon    glucagon    glucagon HCL    Glucommander - insulin lispro    guaiFENesin    heparin    melatonin    ondansetron    polyethylene glycol     Continuous Medications   Medication Dose Last Rate    heparin  0-4,500 Units/hr 1,500 Units/hr (10/30/24 1326)     Procedures/testing:  TRANSTHORACIC ECHO (TTE) COMPLETE 10/29/2024   1. Left ventricular ejection fraction is moderately decreased, calculated by Daniels's biplane at 31%.   2. There is global hypokinesis of the left ventricle with minor regional variations.   3. Left ventricular diastolic filling was indeterminate.   4. There is reduced right ventricular systolic function.   5. Moderately enlarged right ventricle.   6. The left atrium is mildly dilated.   7. There is moderate mitral annular calcification.   8. Mild to moderate tricuspid regurgitation visualized.   9. Moderately elevated right ventricular systolic pressure.  10. Severe calcific AS with gradients of 64/38.4mmHg and DI of 0.19 and mild AI. ( Note the gradients and VTI were averaged over 5 consecutive beats given atrial fibrillation ) consistent with severe AS.  11. There is severe aortic valve cusp calcification.  12. Mild aortic valve regurgitation.  13. No prior echocardioram available for comparison.    CT TAVR NO CONTRAST CHEST ABDOMEN PELVIS; 10/28/2024   1. Mild calcified atherosclerotic changes involving the thoracoabdominal aorta and its  branches. Please note that, the assessment of vascular patency is not possible in absence of intravenous contrast.  2. Severe calcifications of the aortic valve correlating with history of aortic stenosis.  3. Moderate coronary artery calcifications, with suggestion of underlying stents. Please note,the study is not optimized for evaluation of coronary arteries.  4. Multiple solid non-calcified pulmonary nodules measuring up to 7mm. In absence of prior comparative examinations, to ensure stability, consider follow up non contrast chest CT at 3-6 months, then consider CT chest at 18-24 months.  5. Heterogenous thyroid gland with nodules measuring up to 1.3 cm. Further evaluation with nonemergent thyroid ultrasound is recommended.  6. Cholelithiasis without evidence of acute cholecystitis.  7. Moderate-sized fat containing periumbilical hernia.  8. Atrophic bilateral kidneys.    Telemetry 10/30/2024 (personally reviewed): Atrial fibrillation 60-80s    Physical exam:  General: NAD  Head/ neck: no JVD at 90 degrees  Cardiac: Irregular, S1 S2 , systolic murmur  Pulm: decreased breath sounds throughout, on 4L O2 NC (baseline 3L)  Vascular: Radial 2+ bilaterally  GI: Non distended  Extremities: +1/2 BLE edema, ACE wraps applied to bilateral lower legs  Neuro: no focal neuro deficits   Psych: appropriate mood and behavior, pleasant  Skin: Lukewarm and dry    Assessment/Plan   Faviola Pleitez is a 69 y.o. female with Hx of ESRD-HD, CAD with previous stent, lymphedema, anemia of chronic disease,  chronic respiratory failure (on 3LNC at home), MO, DM, HLP, HTN, recently diagnosed cardiomyopathy presenting from [facility] for evaluation of severe aortic valve stenosis. Also, diagnosed with rhinovirus/bronchitis and new onset afib.      Severe AS  - TTE 10/29/24: EF 31%, GHK, reduced RV systolic function, LA mildly dilated, mod mitral annular calcification, mod elevated RVSP, Severe calcific AS with gradients of 64/38.4mmHg  and DI of 0.19 and mild AI. ( Note the gradients and VTI were averaged over 5 consecutive beats given atrial fibrillation ) consistent with severe aortic stenosis, severe AV cusp calc, mild AR  - CT TAVR without contrast 10/29  Mild calcified atherosclerotic changes involving the thoracoabdominal aorta and its branches. Severe calcifications of the aortic valve correlating with history of aortic stenosis. Moderate coronary artery calcifications, with suggestion of  underlying stents.   -  consult will see patient once imaging complete   - CT Surgery felt patient not SAVR candidate. Patient prefers TAVR.   -- CT TAVR completed  -- Panorex revealed multifocal dental caries. No periapical lucency to suggest abscess  -- L/RHC tomorrow AM     Newly diagnosed CM, type unknown by echo 9/24/24  Acute systolic and diastolic heart failure, decompensated  - echo at OSH  9/27/24 EF 42%. Left Ventricle: Moderately reduced left ventricular systolic function. Moderate global hypokinesis present. Aortic Valve: Trileaflet valve. Severely calcified right and noncoronary cusps. Mild regurgitation. Severe stenosis of the aortic valve. AV mean gradient is 49 mmHg. AV area by continuity VTI is 0.6 cm2. Mitral Valve: Mild annular calcification at the posterior leaflet.  Increased E-point septal separation suggesting decreased forward cardiac output. Moderate regurgitation. Tricuspid Valve: Mild regurgitation. The estimated RVSP is 58 mmHg. Moderately elevated RVSP, consistent with moderate pulmonary hypertension.   Left atrium is mildly dilated.   - TTE 10/29/24: EF 31%, GHK, reduced RV systolic function, LA mildly dilated, mod mitral annular calcification, mod elevated RVSP, Severe calcific AS with gradients of 64/38.4mmHg and DI of 0.19 and mild AI. ( Note the gradients and VTI were averaged over 5 consecutive beats given atrial fibrillation ) consistent with severe aortic stenosis, severe AV cusp calc, mild AR  - admit CXR:  Cardiomegaly and interstitial pulmonary edema. Correlate with volume status. Cannot exclude trace bibasilar pleural effusion.  - BNP 1284  - admit wt: 119 kg  - daily wt: 120 kg  - volume management with HD   - GDMT limited due to ESRD on HD  - Daily standing weights, strict I&O's    Newly diagnosed afib 10/25/24   - seen by cardiology at OSH, started on amio and eliquis  - afib rate controlled on tele  - EKG: atrial fibrillation with frequent PVCs 101 bpm  - 10/28 hold eliquis  - heparin gtt started yesterday evening following resolution of epistaxis   - amiodarone reduced to 200mg daily    CAD  - followed with her cardiologist in [facility].  - Patient admitted with CP, found to have AS   - 2015 non-STEMI and underwent successful coronary artery intervention with a DIANA to her LAD but unfortunately had diagonal ostium jailed with >70% stenosis.   - continue ASA, statin   - historically not on BB due to COPD  - Will need ischemic eval due to CP and new onset CM -->L/R heart cath 10/31 ordered. (On HD day, nephrology informed) NPO ordered.      Chronic respiratory failure   Rhinovirus/Bronchitis  Pulmonary nodules, incidental finding  - CT TAVR: Multiple solid non-calcified pulmonary nodules measuring up to 7 mm. In absence of prior comparative examinations, to ensure stability, consider follow up non contrast chest CT at 3-6 months,  then consider CT chest at 18-24 months.   - Baseline 3L NC at home, 4L inpatient  - Continue prednisone taper per OSH recs-adjusted inpatient for shorter course   - Continue dulera per OSH recs  - Continue guaifenesin PRN per OSH rec    - RT consult for chest therapy (unable to bring up secretions with cough)     ESRD on HD T,TH, Sat via left arm fistula  - 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  - consult nephrology, HD 10/29     Anemia of chronic disease  - Hgb 11.2 on admit      Acute Cellulitis   Hx lymphedema  - continue levaquin for 3 more day  - recently completed  metronidazole course  - wound care consulted, wound care recs ordered     DM  - Glucose well controlled inpatient  - at OSH had poorly controlled BS due to prednisone and home lantus and meal time insulin increased  - Will lower lantus and give SS #1 inpatient  - Glucose elevated. Initiated glucommander     DVT ppx: heparin gtt  DISPO: pending eval for TAVR   Code status: Full Code    Patient seen and discussed with Dr. Marin.    Donte Gutierrez PA-C     I conducted a shared care visit with the SONY overall she feels better after dialysis we will reduce the amiodarone at this time she is now tolerating heparin infusion anticipate cardioversion at the time of valve intervention, blood glucose remains elevated we will are tapering off of the steroids she has evidence of cavities but no abscess right and left heart catheterization tomorrow after dialysis

## 2024-10-30 NOTE — CARE PLAN
The patient's goals for the shift include      The clinical goals for the shift include patient heparin assay will be theraputic this shift    Problem: Respiratory  Goal: Clear secretions with interventions this shift  Outcome: Progressing  Goal: Minimize anxiety/maximize coping throughout shift  Outcome: Progressing  Goal: Minimal/no exertional discomfort or dyspnea this shift  Outcome: Progressing  Goal: No signs of respiratory distress (eg. Use of accessory muscles. Peds grunting)  Outcome: Progressing  Goal: Patent airway maintained this shift  Outcome: Progressing  Goal: Tolerate mechanical ventilation evidenced by VS/agitation level this shift  Outcome: Progressing  Goal: Tolerate pulmonary toileting this shift  Outcome: Progressing  Goal: Verbalize decreased shortness of breath this shift  Outcome: Progressing  Goal: Wean oxygen to maintain O2 saturation per order/standard this shift  Outcome: Progressing  Goal: Increase self care and/or family involvement in next 24 hours  Outcome: Progressing     Problem: Fall/Injury  Goal: Not fall by end of shift  Outcome: Progressing  Goal: Be free from injury by end of the shift  Outcome: Progressing  Goal: Verbalize understanding of personal risk factors for fall in the hospital  Outcome: Progressing  Goal: Verbalize understanding of risk factor reduction measures to prevent injury from fall in the home  Outcome: Progressing  Goal: Use assistive devices by end of the shift  Outcome: Progressing  Goal: Pace activities to prevent fatigue by end of the shift  Outcome: Progressing     Problem: Pain - Adult  Goal: Verbalizes/displays adequate comfort level or baseline comfort level  Outcome: Progressing     Problem: Safety - Adult  Goal: Free from fall injury  Outcome: Progressing     Problem: Discharge Planning  Goal: Discharge to home or other facility with appropriate resources  Outcome: Progressing     Problem: Chronic Conditions and Co-morbidities  Goal: Patient's  chronic conditions and co-morbidity symptoms are monitored and maintained or improved  Outcome: Progressing

## 2024-10-30 NOTE — SIGNIFICANT EVENT
Rapid Response Nurse Note: RADAR alert: 9    Pager time:   Arrival time:   Event end time:   Location: T5  [] Triage by phone or secure messaging    Rapid response initiated by:  [] Rapid response RN [] Family [] Nursing Supervisor [] Physician   [x] RADAR auto page [] Sepsis auto-page [] RN [] RT   [] NP/PA [] Other:     Primary reason for call:   [] BAT [] New CPAP/BiPAP [] Bleeding [] Change in mental status   [] Chest pain [] Code blue [] FiO2 >/= 50% [] HR </= 40 bpm   [] HR >/= 130 bpm [] Hyperglycemia [] Hypoglycemia [] RADAR    [] RR </= 8 bpm [] RR >/= 30 bpm [] SBP </= 90 mmHg [] SpO2 < 90%   [] Seizure [] Sepsis [] Shorness of breath  [] Staff concern: see comments     Initial VS and/or RADAR VS: T 36.2 °C; HR 63; RR 22; BP 82/51; SPO2 93%.  Providers present at bedside (if applicable):     Interventions:  [] None [] ABG/VBG [] Assist w/ICU transfer [] BAT paged    [] Bag mask [] Blood [] Cardioversion [] Code Blue   [] Code blue for intubation [] Code status changed [] Chest x-ray [] EKG   [] IV fluid/bolus [] KUB x-ray [] Labs/cultures [] Medication   [] Nebulizer treatment [] NIPPV (CPAP/BiPAP) [] Oxygen [] Oral airway   [] Peripheral IV [] Palliative care consult [] CT/MRI [] Sepsis protocol    [] Suctioned [] Other:     Name of ICU Provider contacted (if applicable):   Outcome:  [] Coded and  [] Code blue for intubation [] Coded and transferred to ICU []  on division   [] Remained on division (no change) [] Remained on division + additional monitoring [] Remained in ED [] Transferred to ED   [] Transferred to ICU [] Transferred to inpatient status [] Transferred for interventions (procedure) [] Transferred to ICU stepdown    [] Transferred to surgery [] Transferred to telemetry [] Sepsis protocol [] STEMI protocol   [] Stroke protocol [x] Bedside nurse instructed to page rapid response for any concerns or acute change in condition/VS     Additional Comments: RADAR alert- BP  lower with a SBP in the 80's. Upon arrival pt sitting on the side of the bed- appears comfortable with no complaints. Recheck of BP - see flow sheet documentation. Updated with bedside nurse and no concerns- pt had also been given her Midodrine dose. Pt ok to remain on the floor- tele at this time.

## 2024-10-30 NOTE — PROGRESS NOTES
"Faviola Pleitez is a 69 y.o. female on day 2 of admission presenting with Severe aortic stenosis.    Subjective   Pt sitting up in chair with family at bedside. Denies sob, n/v/d, fever, cough, chills, pain, chest pains, light head, dizziness, constipation       Objective     Physical Exam  Constitutional:       Appearance: She is obese.   Cardiovascular:      Comments: AFIB-74  Pulmonary:      Comments: George lung sounds dim  O2 2L nc  O2 2L ( home use )  Abdominal:      General: There is distension.      Comments: Non-tender to palpation   Genitourinary:     Comments: States anuric  Musculoskeletal:      Comments: Ble edema with acewraps   Skin:     General: Skin is warm and dry.   Neurological:      Mental Status: She is alert and oriented to person, place, and time.   Psychiatric:         Mood and Affect: Mood normal.         Behavior: Behavior normal.         Last Recorded Vitals  Blood pressure 99/50, pulse 71, temperature 36.3 °C (97.3 °F), temperature source Temporal, resp. rate 20, height 1.626 m (5' 4\"), weight 120 kg (264 lb 1.8 oz), SpO2 97%.  Intake/Output last 3 Shifts:  I/O last 3 completed shifts:  In: 1827.2 (15.3 mL/kg) [P.O.:540; I.V.:887.2 (7.4 mL/kg); Other:400]  Out: 1827 (15.3 mL/kg) [Other:1827]  Weight: 119.8 kg     Relevant Results  Scheduled medications  [START ON 10/31/2024] amiodarone, 200 mg, oral, Daily  [Held by provider] apixaban, 5 mg, oral, BID  aspirin, 81 mg, oral, Daily  atorvastatin, 40 mg, oral, q AM  calcium acetate, 1,334 mg, oral, TID  fluticasone furoate-vilanteroL, 1 puff, inhalation, Daily  [START ON 10/31/2024] Glucommander - insulin glargine, 1-125 Units, subcutaneous, Daily   And  Glucommander - insulin lispro, 0-125 Units, subcutaneous, With meals & nightly  levoFLOXacin, 250 mg, oral, Daily  midodrine, 5 mg, oral, TID  pantoprazole, 40 mg, oral, Daily before breakfast  [START ON 10/31/2024] predniSONE, 30 mg, oral, Daily   Followed by  [START ON 11/1/2024] predniSONE, " 20 mg, oral, Daily   Followed by  [START ON 11/2/2024] predniSONE, 10 mg, oral, Daily  sennosides-docusate sodium, 1 tablet, oral, Nightly  vitamin B complex-vitamin C-folic acid, 1 capsule, oral, Daily      Continuous medications  heparin, 0-4,500 Units/hr, Last Rate: 1,500 Units/hr (10/30/24 1326)      PRN medications  PRN medications: acetaminophen, dextrose, glucagon, glucagon, glucagon HCL, [START ON 10/31/2024] Glucommander - insulin glargine **AND** Glucommander - insulin lispro **AND** Glucommander - insulin lispro **AND** POCT Glucose, guaiFENesin, heparin, melatonin, ondansetron, polyethylene glycol   Results for orders placed or performed during the hospital encounter of 10/28/24 (from the past 24 hours)   POCT GLUCOSE   Result Value Ref Range    POCT Glucose 142 (H) 74 - 99 mg/dL   POCT GLUCOSE   Result Value Ref Range    POCT Glucose 190 (H) 74 - 99 mg/dL   POCT GLUCOSE   Result Value Ref Range    POCT Glucose 132 (H) 74 - 99 mg/dL   Heparin Assay, UFH   Result Value Ref Range    Heparin Unfractionated 2.0 (HH) See Comment Below for Therapeutic Ranges IU/mL   Heparin Assay, UFH   Result Value Ref Range    Heparin Unfractionated 1.7 (HH) See Comment Below for Therapeutic Ranges IU/mL   Heparin Assay, UFH   Result Value Ref Range    Heparin Unfractionated 1.9 (HH) See Comment Below for Therapeutic Ranges IU/mL   POCT GLUCOSE   Result Value Ref Range    POCT Glucose 130 (H) 74 - 99 mg/dL   POCT GLUCOSE   Result Value Ref Range    POCT Glucose 118 (H) 74 - 99 mg/dL   CBC   Result Value Ref Range    WBC 12.7 (H) 4.4 - 11.3 x10*3/uL    nRBC 2.7 (H) 0.0 - 0.0 /100 WBCs    RBC 3.66 (L) 4.00 - 5.20 x10*6/uL    Hemoglobin 12.0 12.0 - 16.0 g/dL    Hematocrit 36.8 36.0 - 46.0 %     (H) 80 - 100 fL    MCH 32.8 26.0 - 34.0 pg    MCHC 32.6 32.0 - 36.0 g/dL    RDW 17.1 (H) 11.5 - 14.5 %    Platelets 147 (L) 150 - 450 x10*3/uL   Heparin Assay, UFH   Result Value Ref Range    Heparin Unfractionated 1.4 (HH) See  Comment Below for Therapeutic Ranges IU/mL   Renal function panel   Result Value Ref Range    Glucose 107 (H) 74 - 99 mg/dL    Sodium 134 (L) 136 - 145 mmol/L    Potassium 5.8 (H) 3.5 - 5.3 mmol/L    Chloride 94 (L) 98 - 107 mmol/L    Bicarbonate 27 21 - 32 mmol/L    Anion Gap 19 10 - 20 mmol/L    Urea Nitrogen 53 (H) 6 - 23 mg/dL    Creatinine 4.62 (H) 0.50 - 1.05 mg/dL    eGFR 10 (L) >60 mL/min/1.73m*2    Calcium 8.6 8.6 - 10.6 mg/dL    Phosphorus 4.2 2.5 - 4.9 mg/dL    Albumin 3.6 3.4 - 5.0 g/dL   POCT GLUCOSE   Result Value Ref Range    POCT Glucose 124 (H) 74 - 99 mg/dL   Heparin Assay, UFH   Result Value Ref Range    Heparin Unfractionated 0.8 See Comment Below for Therapeutic Ranges IU/mL                             Assessment/Plan   Assessment & Plan  Severe aortic stenosis    Rhinovirus    Tolerated hemodialysis yesterday with net fluid loss loss 1.4L    Bp started at 131/58 with drop to 85/52 at which UF OFF, bp at 95/46 at term of tx, euvolemic on exam and has stable electrolytes . K+=5.8 with mild hemolysis.. awaiting repeat results     Outpatient Dialysis schedule: TTS [facility]/Dr Woodruff       Access: lt fist with +thrill/bruit- no issues - able to achieve      Anemia of ESRD:   not on JOHAN.. current hgb 12.0.. will cont to monitor... (Mircera 30mcg q4wks op )     CKD-MBD Phosphate Binder:calcium acetate (Phoslo) capsule 1,334 mg tid ac, vitamin B complex-vitamin C-folic acid (Nephrocaps) capsule 1 capsule daily     Plan HD tomorrow with UF as tolerated     Renal diet      Please obtain daily standing wt (if possible)     Medication to be adjusted for ESRD      Patient to continue regular HD schedule while inpatient and to follow with the outpatient nephrologist at discharge              SHARONDA Lawrence-CNP

## 2024-10-30 NOTE — PROGRESS NOTES
10/30/24 1321   Discharge Planning   Living Arrangements Children   Support Systems Children   Assistance Needed None   Type of Residence Private residence   Number of Stairs to Enter Residence 0   Number of Stairs Within Residence 0   Do you have animals or pets at home? Yes   Type of Animals or Pets 1 dog   Who is requesting discharge planning? Provider   Expected Discharge Disposition SNF   Does the patient need discharge transport arranged? No   Financial Resource Strain   How hard is it for you to pay for the very basics like food, housing, medical care, and heating? Not very   Housing Stability   In the last 12 months, was there a time when you were not able to pay the mortgage or rent on time? N   At any time in the past 12 months, were you homeless or living in a shelter (including now)? N   Transportation Needs   In the past 12 months, has lack of transportation kept you from medical appointments or from getting medications? no   In the past 12 months, has lack of transportation kept you from meetings, work, or from getting things needed for daily living? No   Patient Choice   Provider Choice list and CMS website (https://medicare.gov/care-compare#search) for post-acute Quality and Resource Measure Data were provided and reviewed with: Patient   Patient / Family choosing to utilize agency / facility established prior to hospitalization No     10/30/24        Transitional Care Coordination Progress Note:  Patient discussed during interdisciplinary rounds.   Team members present: RN WANG TREJO MD   Plan per Medical/Surgical team: pending eval for TAVR   Payor: MEDICARE   Discharge disposition: Home   Potential Barriers: None   ADOD: 11-2-2024     Previous Home Care:   DME: [facility] None   Falls: None   Dialysis: [facility]

## 2024-10-30 NOTE — DISCHARGE SUMMARY
[facility] Hospitalist Physician Discharge Summary       Lourdes Murrell MD  [address] [place] [address] [phone]    Schedule an appointment as soon as possible for a visit in 1 week(s)  Hospital Follow up      Activity level: As tolerated     Dispo: Home      Condition on discharge: Stable     Patient ID:  Lizette Murray  [id number]  69 y.o.  1955    Admit date: 10/22/2024    Discharge date and time:  10/25/2024  12:41 PM    Admission Diagnoses: Principal Problem:    Acute bronchitis  Active Problems:    Diabetes mellitus (HCC)    ESRD on hemodialysis (HCC)    Lymphedema of both lower extremities    Essential hypertension    Coronary artery disease of native artery of native heart with stable angina pectoris (HCC)    Aortic stenosis, severe    Acute bronchitis due to other specified organisms  Resolved Problems:    * No resolved hospital problems. *      Discharge Diagnoses: Principal Problem:    Acute bronchitis  Active Problems:    Diabetes mellitus (HCC)    ESRD on hemodialysis (HCC)    Lymphedema of both lower extremities    Essential hypertension    Coronary artery disease of native artery of native heart with stable angina pectoris (HCC)    Aortic stenosis, severe    Acute bronchitis due to other specified organisms  Resolved Problems:    * No resolved hospital problems. *      Consults:  IP CONSULT TO NEPHROLOGY    Procedures:     Hospital Course:   Patient Lizette Murray is a 69 y.o. with PMHx CHF, ESRD, PE, T2DM, HTN, HLD, Pulm HTN, chronic hypoxia at 2 L who presented with Acute bronchitis [J20.9]  Bigeminy [I49.8]  Rhinovirus [B34.8]  ESRD on dialysis (HCC) [N18.6, Z99.2]  She presented with nausea, chest pain and cough, she had missed HD as well. Patient was found to have rhinovirus infection, also had elevated procalcitonin and placed on Lexaquin, after which she improved. She will be discharged on Dulera as well. She will have a prednisone taper for discharge. [facility] has been 
pharmacy    Bring a paper prescription for each of these medications  predniSONE 10 MG tablet           Note that 35 minutes were spent in preparing discharge papers, discussing discharge with patient, medication review, etc.    Signed:  Electronically signed by Moses Gaspar MD on 10/30/2024 at 4:43 PM

## 2024-10-31 DIAGNOSIS — I35.0 AORTIC STENOSIS: ICD-10-CM

## 2024-10-31 LAB
ALBUMIN SERPL BCP-MCNC: 3.4 G/DL (ref 3.4–5)
ALBUMIN SERPL BCP-MCNC: 3.5 G/DL (ref 3.4–5)
ALBUMIN SERPL BCP-MCNC: 3.6 G/DL (ref 3.4–5)
ANION GAP SERPL CALC-SCNC: 21 MMOL/L (ref 10–20)
ANION GAP SERPL CALC-SCNC: 21 MMOL/L (ref 10–20)
ANION GAP SERPL CALC-SCNC: 25 MMOL/L (ref 10–20)
BUN SERPL-MCNC: 73 MG/DL (ref 6–23)
BUN SERPL-MCNC: 73 MG/DL (ref 6–23)
BUN SERPL-MCNC: 78 MG/DL (ref 6–23)
CALCIUM SERPL-MCNC: 8.1 MG/DL (ref 8.6–10.6)
CALCIUM SERPL-MCNC: 8.4 MG/DL (ref 8.6–10.6)
CALCIUM SERPL-MCNC: 8.7 MG/DL (ref 8.6–10.6)
CHLORIDE SERPL-SCNC: 87 MMOL/L (ref 98–107)
CHLORIDE SERPL-SCNC: 90 MMOL/L (ref 98–107)
CHLORIDE SERPL-SCNC: 91 MMOL/L (ref 98–107)
CO2 SERPL-SCNC: 22 MMOL/L (ref 21–32)
CO2 SERPL-SCNC: 24 MMOL/L (ref 21–32)
CO2 SERPL-SCNC: 26 MMOL/L (ref 21–32)
CREAT SERPL-MCNC: 5.51 MG/DL (ref 0.5–1.05)
CREAT SERPL-MCNC: 5.69 MG/DL (ref 0.5–1.05)
CREAT SERPL-MCNC: 5.98 MG/DL (ref 0.5–1.05)
EGFRCR SERPLBLD CKD-EPI 2021: 7 ML/MIN/1.73M*2
EGFRCR SERPLBLD CKD-EPI 2021: 8 ML/MIN/1.73M*2
EGFRCR SERPLBLD CKD-EPI 2021: 8 ML/MIN/1.73M*2
ERYTHROCYTE [DISTWIDTH] IN BLOOD BY AUTOMATED COUNT: 16.8 % (ref 11.5–14.5)
GLUCOSE BLD MANUAL STRIP-MCNC: 123 MG/DL (ref 74–99)
GLUCOSE BLD MANUAL STRIP-MCNC: 178 MG/DL (ref 74–99)
GLUCOSE BLD MANUAL STRIP-MCNC: 186 MG/DL (ref 74–99)
GLUCOSE BLD MANUAL STRIP-MCNC: 222 MG/DL (ref 74–99)
GLUCOSE BLD MANUAL STRIP-MCNC: 289 MG/DL (ref 74–99)
GLUCOSE BLD MANUAL STRIP-MCNC: 292 MG/DL (ref 74–99)
GLUCOSE BLD MANUAL STRIP-MCNC: 298 MG/DL (ref 74–99)
GLUCOSE BLD MANUAL STRIP-MCNC: 304 MG/DL (ref 74–99)
GLUCOSE BLD MANUAL STRIP-MCNC: 327 MG/DL (ref 74–99)
GLUCOSE BLD MANUAL STRIP-MCNC: 335 MG/DL (ref 74–99)
GLUCOSE BLD MANUAL STRIP-MCNC: 353 MG/DL (ref 74–99)
GLUCOSE SERPL-MCNC: 165 MG/DL (ref 74–99)
GLUCOSE SERPL-MCNC: 242 MG/DL (ref 74–99)
GLUCOSE SERPL-MCNC: 551 MG/DL (ref 74–99)
HCT VFR BLD AUTO: 34.8 % (ref 36–46)
HGB BLD-MCNC: 11.1 G/DL (ref 12–16)
MAGNESIUM SERPL-MCNC: 2.41 MG/DL (ref 1.6–2.4)
MCH RBC QN AUTO: 32.1 PG (ref 26–34)
MCHC RBC AUTO-ENTMCNC: 31.9 G/DL (ref 32–36)
MCV RBC AUTO: 101 FL (ref 80–100)
NRBC BLD-RTO: 1.8 /100 WBCS (ref 0–0)
PHOSPHATE SERPL-MCNC: 3.9 MG/DL (ref 2.5–4.9)
PHOSPHATE SERPL-MCNC: 4.3 MG/DL (ref 2.5–4.9)
PHOSPHATE SERPL-MCNC: 4.6 MG/DL (ref 2.5–4.9)
PLATELET # BLD AUTO: 137 X10*3/UL (ref 150–450)
POTASSIUM SERPL-SCNC: 5.3 MMOL/L (ref 3.5–5.3)
POTASSIUM SERPL-SCNC: 6.3 MMOL/L (ref 3.5–5.3)
POTASSIUM SERPL-SCNC: 6.3 MMOL/L (ref 3.5–5.3)
RBC # BLD AUTO: 3.46 X10*6/UL (ref 4–5.2)
SODIUM SERPL-SCNC: 126 MMOL/L (ref 136–145)
SODIUM SERPL-SCNC: 131 MMOL/L (ref 136–145)
SODIUM SERPL-SCNC: 133 MMOL/L (ref 136–145)
UFH PPP CHRO-ACNC: 0.4 IU/ML
UFH PPP CHRO-ACNC: 0.6 IU/ML
UFH PPP CHRO-ACNC: 0.6 IU/ML
UFH PPP CHRO-ACNC: 0.8 IU/ML
UFH PPP CHRO-ACNC: 0.9 IU/ML
UFH PPP CHRO-ACNC: 1.3 IU/ML
WBC # BLD AUTO: 9.8 X10*3/UL (ref 4.4–11.3)

## 2024-10-31 PROCEDURE — 80069 RENAL FUNCTION PANEL: CPT

## 2024-10-31 PROCEDURE — 2500000001 HC RX 250 WO HCPCS SELF ADMINISTERED DRUGS (ALT 637 FOR MEDICARE OP): Performed by: NURSE PRACTITIONER

## 2024-10-31 PROCEDURE — 36415 COLL VENOUS BLD VENIPUNCTURE: CPT | Performed by: NURSE PRACTITIONER

## 2024-10-31 PROCEDURE — 83735 ASSAY OF MAGNESIUM: CPT

## 2024-10-31 PROCEDURE — 99233 SBSQ HOSP IP/OBS HIGH 50: CPT | Performed by: INTERNAL MEDICINE

## 2024-10-31 PROCEDURE — 2500000002 HC RX 250 W HCPCS SELF ADMINISTERED DRUGS (ALT 637 FOR MEDICARE OP, ALT 636 FOR OP/ED)

## 2024-10-31 PROCEDURE — 85520 HEPARIN ASSAY: CPT | Performed by: NURSE PRACTITIONER

## 2024-10-31 PROCEDURE — 71275 CT ANGIOGRAPHY CHEST: CPT | Performed by: STUDENT IN AN ORGANIZED HEALTH CARE EDUCATION/TRAINING PROGRAM

## 2024-10-31 PROCEDURE — 2500000001 HC RX 250 WO HCPCS SELF ADMINISTERED DRUGS (ALT 637 FOR MEDICARE OP)

## 2024-10-31 PROCEDURE — 1200000002 HC GENERAL ROOM WITH TELEMETRY DAILY

## 2024-10-31 PROCEDURE — 74174 CTA ABD&PLVS W/CONTRAST: CPT | Performed by: STUDENT IN AN ORGANIZED HEALTH CARE EDUCATION/TRAINING PROGRAM

## 2024-10-31 PROCEDURE — 2550000001 HC RX 255 CONTRASTS: Performed by: NURSE PRACTITIONER

## 2024-10-31 PROCEDURE — 85027 COMPLETE CBC AUTOMATED: CPT

## 2024-10-31 PROCEDURE — 90935 HEMODIALYSIS ONE EVALUATION: CPT | Performed by: INTERNAL MEDICINE

## 2024-10-31 PROCEDURE — 2500000005 HC RX 250 GENERAL PHARMACY W/O HCPCS

## 2024-10-31 PROCEDURE — 2500000004 HC RX 250 GENERAL PHARMACY W/ HCPCS (ALT 636 FOR OP/ED): Performed by: NURSE PRACTITIONER

## 2024-10-31 PROCEDURE — 80069 RENAL FUNCTION PANEL: CPT | Performed by: STUDENT IN AN ORGANIZED HEALTH CARE EDUCATION/TRAINING PROGRAM

## 2024-10-31 PROCEDURE — 2500000002 HC RX 250 W HCPCS SELF ADMINISTERED DRUGS (ALT 637 FOR MEDICARE OP, ALT 636 FOR OP/ED): Performed by: STUDENT IN AN ORGANIZED HEALTH CARE EDUCATION/TRAINING PROGRAM

## 2024-10-31 PROCEDURE — 82947 ASSAY GLUCOSE BLOOD QUANT: CPT

## 2024-10-31 ASSESSMENT — COGNITIVE AND FUNCTIONAL STATUS - GENERAL
TOILETING: A LITTLE
STANDING UP FROM CHAIR USING ARMS: A LITTLE
MOVING TO AND FROM BED TO CHAIR: A LITTLE
TOILETING: A LITTLE
CLIMB 3 TO 5 STEPS WITH RAILING: A LOT
PERSONAL GROOMING: A LITTLE
CLIMB 3 TO 5 STEPS WITH RAILING: A LOT
PERSONAL GROOMING: A LITTLE
HELP NEEDED FOR BATHING: A LOT
MOVING FROM LYING ON BACK TO SITTING ON SIDE OF FLAT BED WITH BEDRAILS: A LITTLE
WALKING IN HOSPITAL ROOM: A LITTLE
STANDING UP FROM CHAIR USING ARMS: A LITTLE
MOBILITY SCORE: 17
HELP NEEDED FOR BATHING: A LOT
DRESSING REGULAR UPPER BODY CLOTHING: A LITTLE
DRESSING REGULAR LOWER BODY CLOTHING: A LOT
TURNING FROM BACK TO SIDE WHILE IN FLAT BAD: A LITTLE
DRESSING REGULAR UPPER BODY CLOTHING: A LITTLE
DRESSING REGULAR LOWER BODY CLOTHING: A LOT
DAILY ACTIVITIY SCORE: 17
TURNING FROM BACK TO SIDE WHILE IN FLAT BAD: A LITTLE
MOVING TO AND FROM BED TO CHAIR: A LITTLE
WALKING IN HOSPITAL ROOM: A LITTLE
MOVING FROM LYING ON BACK TO SITTING ON SIDE OF FLAT BED WITH BEDRAILS: A LITTLE
MOBILITY SCORE: 17
DAILY ACTIVITIY SCORE: 17

## 2024-10-31 ASSESSMENT — PAIN SCALES - GENERAL
PAINLEVEL_OUTOF10: 0 - NO PAIN
PAINLEVEL_OUTOF10: 0 - NO PAIN

## 2024-10-31 ASSESSMENT — PAIN - FUNCTIONAL ASSESSMENT
PAIN_FUNCTIONAL_ASSESSMENT: NO/DENIES PAIN
PAIN_FUNCTIONAL_ASSESSMENT: 0-10

## 2024-10-31 NOTE — NURSING NOTE
"0057 RN contacted MD via secure chart regarding heparin assay result an hour after holding heparin. Heparin assay was 0.9.  Per order \"If repeat is > 0.7, continue to HOLD INFUSION. Confirm last draw was performed correctly (pump was paused for a minimum of 2 minutes, sample NOT drawn off line/lumen where medication was infusing) and contact provider for further orders. No response from Mahin Boyd MD       0430 This nurse contacted via page Mahin Boyd MD regarding heparin assay redraw 1 hour after holding heparin due to heparin assay being 0.9 Per order \"This nurse asked MD for further orders and was told \" You don't have to call me just follow the orders\" After explaining that contacting MD was part of order set, was told to redraw heparin assay and follow current order set, that there was no need to call after redraw.   "

## 2024-10-31 NOTE — CARE PLAN
The patient's goals for the shift include      The clinical goals for the shift include pt will be therapeutic on heparin drip this shift    Over the shift, the patient did not make progress toward the following goals. Barriers to progression include ***. Recommendations to address these barriers include ***.

## 2024-10-31 NOTE — CARE PLAN
The patient's goals for the shift include      The clinical goals for the shift include Pt will remain theraputic on hep drip      Problem: Respiratory  Goal: Clear secretions with interventions this shift  Outcome: Progressing  Goal: Minimize anxiety/maximize coping throughout shift  Outcome: Progressing  Goal: Minimal/no exertional discomfort or dyspnea this shift  Outcome: Progressing  Goal: No signs of respiratory distress (eg. Use of accessory muscles. Peds grunting)  Outcome: Progressing  Goal: Patent airway maintained this shift  Outcome: Progressing  Goal: Tolerate mechanical ventilation evidenced by VS/agitation level this shift  Outcome: Progressing  Goal: Tolerate pulmonary toileting this shift  Outcome: Progressing  Goal: Verbalize decreased shortness of breath this shift  Outcome: Progressing  Goal: Wean oxygen to maintain O2 saturation per order/standard this shift  Outcome: Progressing  Goal: Increase self care and/or family involvement in next 24 hours  Outcome: Progressing     Problem: Fall/Injury  Goal: Not fall by end of shift  Outcome: Progressing  Goal: Be free from injury by end of the shift  Outcome: Progressing  Goal: Verbalize understanding of personal risk factors for fall in the hospital  Outcome: Progressing  Goal: Verbalize understanding of risk factor reduction measures to prevent injury from fall in the home  Outcome: Progressing  Goal: Use assistive devices by end of the shift  Outcome: Progressing  Goal: Pace activities to prevent fatigue by end of the shift  Outcome: Progressing     Problem: Pain - Adult  Goal: Verbalizes/displays adequate comfort level or baseline comfort level  Outcome: Progressing     Problem: Safety - Adult  Goal: Free from fall injury  Outcome: Progressing     Problem: Discharge Planning  Goal: Discharge to home or other facility with appropriate resources  Outcome: Progressing     Problem: Chronic Conditions and Co-morbidities  Goal: Patient's chronic  conditions and co-morbidity symptoms are monitored and maintained or improved  Outcome: Progressing

## 2024-10-31 NOTE — SIGNIFICANT EVENT
Rapid Response Nurse Note:  [x] RADAR alert/Score 7    Pager time: [phone]  Arrival time: 800  Event end time: 807  Location:  5039  [] Phone triage     Rapid response initiated by:  [] Rapid Response RN [] Family [] Nursing Supervisor [] Physician   [x] RADAR auto-page [] Sepsis auto-page [] RN [] RT   [] NP/PA [] Other:     Primary reason for call:   [] BAT [] New CPAP/BiPAP [] Bleeding [] Change in mental status   [] Chest pain [] Code blue [] FiO2 >/= 50% [] HR </= 40 bpm   [] HR >/= 130 bpm [] Hyperglycemia [] Hypoglycemia [x] RADAR    [] RR </= 8 bpm [] RR >/= 30 bpm [] SBP </= 90 mmHg [] SpO2 < 90%   [] Seizure [] Sepsis [] Staff concern:     Initial VS and/or RADAR VS:  radar vitals below in bold    Vitals:    10/30/24 2012 10/31/24 0021 10/31/24 0423 10/31/24 0700   BP: 104/60 103/71 108/71 91/58   BP Location: Right arm Right arm Right arm Right arm   Patient Position: Sitting Sitting Sitting Sitting   Pulse: 60 74 62 71   Resp: 20 20 20 20   Temp: 36.3 °C (97.3 °F) 36.2 °C (97.2 °F) 36.3 °C (97.3 °F) 35.9 °C (96.6 °F)   TempSrc: Temporal Temporal Temporal Temporal   SpO2: 95% 95% 98% 92%   Weight:       Height:            Interventions:  [x] None [] ABG [] Assist w/ICU transfer [] BAT paged    [] Bag mask [] Blood [] Cardioversion [] Code Blue   [] Code blue for intubation [] Code status changed [] Chest x-ray [] EKG   [] IV fluid/bolus [] KUB x-ray [] Labs/cultures [] Medication   [] Nebulizer treatment [] NIPPV (CPAP/BiPAP) [] Oxygen [] Oral airway   [] Peripheral IV [] Palliative care consult [] CT/MRI [] Sepsis protocol    [] Suctioned [] Other:       Outcome:  [] Coded and  [] Code blue for intubation [] Coded and transferred to ICU []  on division   [x] Remained on division (no change) [] Remained on division + additional monitoring [] Remained in ED [] Transferred to ED   [] Transferred to ICU [] Transferred to inpatient status [] Transferred for interventions (procedure) []  Transferred to ICU stepdown    [] Transferred to surgery [] Transferred to telemetry [] Sepsis protocol [] STEMI protocol   [] Stroke protocol [x] Bedside nurse instructed to page rapid response for any concerns or acute change in condition/VS     Additional Comments: Spoke to RN regarding vital signs.  No concerns from RN at this time.

## 2024-10-31 NOTE — INTERVAL H&P NOTE
H&P reviewed. The patient was examined and there are no changes to the H&P. Reports improved breathing since being in the hospital; does wear 2L O2 chronically. Reports chest pressure that radiated to her arm and shoulder that felt similar to prior MI in 2015. Typically unable to lay flat and sleeps in recliner but thinks she can lay flat for procedure. Chronic bilateral lymphedema. LUE AV fistula, no HD access on RUE.

## 2024-10-31 NOTE — NURSING NOTE
Report from Sending RN:    Report From: JENNIFER Esteves  Recent Surgery of Procedure: Yes, CT Scan  Baseline Level of Consciousness (LOC): A/O X 4  Oxygen Use: Yes, 3L O2 sat %  Type: NC  Diabetic: Yes, 178  Last BP Med Given Day of Dialysis: midodrine last BP 91/55  Last Pain Med Given: none  Lab Tests to be Obtained with Dialysis: Yes, Heparin assay 15:00  Blood Transfusion to be Given During Dialysis: No  Available IV Access: Yes  Medications to be Administered During Dialysis: No  Continuous IV Infusion Running: Yes, Heparin gtt 800 units/hr  Restraints on Currently or in the Last 24 Hours: No  Hand-Off Communication: full code, droplet precautions, pt can stand safely on a scale with a walker but is requesting to travel by bed  Dialysis Catheter Dressing: N/A Fistula  Last Dressing Change: N/A

## 2024-10-31 NOTE — NURSING NOTE
Report to Receiving RN:    Report To: JENNIFER Esteves  Time Report Called: [id number]  Hand-Off Communication:  Primary nurse unavailable for report on phone- smart text: Pt completed 3.75 hrs HD, 1.7L fluid removed, tolerated tx, /68, HR78, pt stable. A/O.   Complications During Treatment: No  Ultrafiltration Treatment: Yes  Medications Administered During Dialysis: No  Blood Products Administered During Dialysis: No  Labs Sent During Dialysis: Yes  Heparin Drip Rate Changes: No    Last Updated: 6:08 PM by RADHA VALDEZ

## 2024-10-31 NOTE — PROGRESS NOTES
Faviola Pleitez is a 69 y.o. female on day 3 of admission presenting with Severe aortic stenosis.      Subjective   Seen on HD, no complaints. Planned for [facility] later today     Objective   Vitals 24HR  Heart Rate:  [60-76]   Temp:  [35.2 °C (95.4 °F)-36.3 °C (97.3 °F)]   Resp:  [20-22]   BP: ()/(51-71)   SpO2:  [92 %-98 %]     Intake/Output last 3 Shifts:    Intake/Output Summary (Last 24 hours) at 10/31/2024 1540  Last data filed at 10/31/2024 1340  Gross per 24 hour   Intake 850.61 ml   Output --   Net 850.61 ml       Physical Exam  No distress  HEENT:  moist, no pallor  1+ edema calvin LE  Breath sounds calvin equal, clear  S1 S2 regular, normal, no rub or murmur  Abdomen soft  AAO x3, non focal    amiodarone, 200 mg, oral, Daily  [Held by provider] apixaban, 5 mg, oral, BID  aspirin, 81 mg, oral, Daily  atorvastatin, 40 mg, oral, q AM  calcium acetate, 1,334 mg, oral, TID  fluticasone furoate-vilanteroL, 1 puff, inhalation, Daily  Glucommander - insulin glargine, 1-125 Units, subcutaneous, Daily   And  Glucommander - insulin lispro, 0-125 Units, subcutaneous, With meals & nightly  midodrine, 5 mg, oral, TID  pantoprazole, 40 mg, oral, Daily before breakfast  [START ON 11/1/2024] predniSONE, 20 mg, oral, Daily   Followed by  [START ON 11/2/2024] predniSONE, 10 mg, oral, Daily  sennosides-docusate sodium, 1 tablet, oral, Nightly  vitamin B complex-vitamin C-folic acid, 1 capsule, oral, Daily      Assessment/Plan      69 Year old with h/o ESRD on HD admitted with severe ao stenosis for possible TAVR  Nephrology following for ESRD    #ESRD: Tolerating HD per submitted orders, 2 K, 2.5 Ca, UF goal 3 L     # Anemia: HgB   Hemoglobin   Date Value Ref Range Status   10/31/2024 11.1 (L) 12.0 - 16.0 g/dL Final   continue to hold JOHAN    # MBD: Ca   Calcium   Date Value Ref Range Status   10/31/2024 8.4 (L) 8.6 - 10.6 mg/dL Final     Phosphorus   Date Value Ref Range Status   10/31/2024 3.9 2.5 - 4.9 mg/dL Final     Comment:      The performance characteristics of phosphorus testing in heparinized plasma have been validated by the individual  laboratory site where testing is performed. Testing on heparinized plasma is not approved by the FDA; however, such approval is not necessary.   no need for phosphate binders. Continue daily renal MVI.      #Hypertension:  asymptomatic hypotension, ct midodrine    #Volume status: continue UF as tolerated    Will continue to follow

## 2024-10-31 NOTE — TREATMENT PLAN
Faviola Pleitez is a 69 y.o. female presenting from [facility] for evaluation of severe aortic valve stenosis. Hx of ESRD-HD, CAD with previous stent, lymphedema, anemia of chronic disease,  chronic respiratory failure (on 3LNC at home), MO, DM, HLP, HTN.     Plan for inpatient treatment with TAVR    Plan  Repeat CT with contrast (ordered), unable to safely plan procedure with non-con imaging  [facility] today with Dr Hart  Plan for TAVR on Tuesday  Hold Eliquis- if AC needed would prefer Heparin gtt at this time until TAVR complete      Have reviewed case with DR. Hailey Hall MSN, [facility]  Structural Heart Program  Advanced Interventional Cardiology  Pager [phone]  Team pager [phone]

## 2024-10-31 NOTE — PROGRESS NOTES
"Interval events:    Hyperkalemic overnight, 6.3 with repeat 5.8. Was started on Lokelma + received 10U regular insulin, 25g D50 and D10W infusion. Repeat early this morning 6.3 x2.    Subjective:  Patient reports not feeling the best this morning following \"eventful night\". She reports feeling symptomatic with her hyperglycemia overnight (nausea, diaphoretic). She reports feeling a little better this morning.     Today in brief:  - per SH: repeat CT TAVR with full contrast (previous was done without contrast, unable to safely plan procedure with non-con imaging). L/RHC today with Dr. Hart. Plan for TAVR on Tuesday 11/5 with Dr. Hart  - remained hyperkalemic following overnight interventions. Cardiac cath pushed back to this evening. Patient dialyzed prior to cath iso hyperkalemia-> repeat K following HD 5.3, will discontinue Lokelma  - discussed insulin settings with endocrine and Glucommander team iso hyperglycemia overnight. As patient has been NPO today, will use dinner orders on glucommander this evening after cardiac cath    Objective:  Vitals:    10/31/24 1340   BP:    Pulse: 65   Resp:    Temp: 35.2 °C (95.4 °F)   SpO2:      Weight         10/28/2024  0829 10/28/2024  1118 10/29/2024  0447 10/30/2024  0447    Weight: 119 kg (262 lb 12.6 oz) 119 kg (262 lb 12.6 oz) 119 kg (262 lb 12.6 oz) 120 kg (264 lb 1.8 oz)            Intake/Output Summary (Last 24 hours) at 10/31/2024 1355  Last data filed at 10/31/2024 1340  Gross per 24 hour   Intake 850.61 ml   Output --   Net 850.61 ml     Recent Results (from the past 24 hours)   Renal Function Panel    Collection Time: 10/30/24  4:51 PM   Result Value Ref Range    Glucose 197 (H) 74 - 99 mg/dL    Sodium 132 (L) 136 - 145 mmol/L    Potassium 6.3 (HH) 3.5 - 5.3 mmol/L    Chloride 92 (L) 98 - 107 mmol/L    Bicarbonate 27 21 - 32 mmol/L    Anion Gap 19 10 - 20 mmol/L    Urea Nitrogen 62 (H) 6 - 23 mg/dL    Creatinine 5.31 (H) 0.50 - 1.05 mg/dL    eGFR 8 (L) " >60 mL/min/1.73m*2    Calcium 8.9 8.6 - 10.6 mg/dL    Phosphorus 4.5 2.5 - 4.9 mg/dL    Albumin 3.6 3.4 - 5.0 g/dL   Heparin Assay, UFH    Collection Time: 10/30/24  4:52 PM   Result Value Ref Range    Heparin Unfractionated 1.1 (HH) See Comment Below for Therapeutic Ranges IU/mL   POCT GLUCOSE    Collection Time: 10/30/24  6:02 PM   Result Value Ref Range    POCT Glucose 205 (H) 74 - 99 mg/dL   POCT GLUCOSE    Collection Time: 10/30/24  8:06 PM   Result Value Ref Range    POCT Glucose 349 (H) 74 - 99 mg/dL   POCT GLUCOSE    Collection Time: 10/30/24  9:07 PM   Result Value Ref Range    POCT Glucose 270 (H) 74 - 99 mg/dL   Renal Function Panel    Collection Time: 10/30/24  9:56 PM   Result Value Ref Range    Glucose 259 (H) 74 - 99 mg/dL    Sodium 132 (L) 136 - 145 mmol/L    Potassium 5.8 (H) 3.5 - 5.3 mmol/L    Chloride 92 (L) 98 - 107 mmol/L    Bicarbonate 26 21 - 32 mmol/L    Anion Gap 20 10 - 20 mmol/L    Urea Nitrogen 71 (H) 6 - 23 mg/dL    Creatinine 5.50 (H) 0.50 - 1.05 mg/dL    eGFR 8 (L) >60 mL/min/1.73m*2    Calcium 9.1 8.6 - 10.6 mg/dL    Phosphorus 4.3 2.5 - 4.9 mg/dL    Albumin 3.6 3.4 - 5.0 g/dL   Heparin Assay, UFH    Collection Time: 10/31/24 12:01 AM   Result Value Ref Range    Heparin Unfractionated 1.3 (HH) See Comment Below for Therapeutic Ranges IU/mL   POCT GLUCOSE    Collection Time: 10/31/24  1:13 AM   Result Value Ref Range    POCT Glucose 335 (H) 74 - 99 mg/dL   POCT GLUCOSE    Collection Time: 10/31/24  2:12 AM   Result Value Ref Range    POCT Glucose 353 (H) 74 - 99 mg/dL   Heparin Assay, UFH    Collection Time: 10/31/24  2:17 AM   Result Value Ref Range    Heparin Unfractionated 0.9 See Comment Below for Therapeutic Ranges IU/mL   ECG 12 Lead    Collection Time: 10/31/24  2:28 AM   Result Value Ref Range    Ventricular Rate 73 BPM    Atrial Rate 681 BPM    QRS Duration 106 ms    QT Interval 442 ms    QTC Calculation(Bazett) 486 ms    R Axis 193 degrees    T Axis 16 degrees    QRS Count  12 beats    Q Onset 212 ms    T Offset 433 ms    QTC Fredericia 472 ms   POCT GLUCOSE    Collection Time: 10/31/24  3:15 AM   Result Value Ref Range    POCT Glucose 327 (H) 74 - 99 mg/dL   POCT GLUCOSE    Collection Time: 10/31/24  4:21 AM   Result Value Ref Range    POCT Glucose 304 (H) 74 - 99 mg/dL   Heparin Assay, UFH    Collection Time: 10/31/24  4:40 AM   Result Value Ref Range    Heparin Unfractionated 0.6 See Comment Below for Therapeutic Ranges IU/mL   CBC    Collection Time: 10/31/24  5:31 AM   Result Value Ref Range    WBC 9.8 4.4 - 11.3 x10*3/uL    nRBC 1.8 (H) 0.0 - 0.0 /100 WBCs    RBC 3.46 (L) 4.00 - 5.20 x10*6/uL    Hemoglobin 11.1 (L) 12.0 - 16.0 g/dL    Hematocrit 34.8 (L) 36.0 - 46.0 %     (H) 80 - 100 fL    MCH 32.1 26.0 - 34.0 pg    MCHC 31.9 (L) 32.0 - 36.0 g/dL    RDW 16.8 (H) 11.5 - 14.5 %    Platelets 137 (L) 150 - 450 x10*3/uL   Renal Function Panel    Collection Time: 10/31/24  5:31 AM   Result Value Ref Range    Glucose 551 (HH) 74 - 99 mg/dL    Sodium 126 (L) 136 - 145 mmol/L    Potassium 6.3 (HH) 3.5 - 5.3 mmol/L    Chloride 87 (L) 98 - 107 mmol/L    Bicarbonate 24 21 - 32 mmol/L    Anion Gap 21 (H) 10 - 20 mmol/L    Urea Nitrogen 73 (H) 6 - 23 mg/dL    Creatinine 5.69 (H) 0.50 - 1.05 mg/dL    eGFR 8 (L) >60 mL/min/1.73m*2    Calcium 8.1 (L) 8.6 - 10.6 mg/dL    Phosphorus 4.6 2.5 - 4.9 mg/dL    Albumin 3.6 3.4 - 5.0 g/dL   Magnesium    Collection Time: 10/31/24  5:31 AM   Result Value Ref Range    Magnesium 2.41 (H) 1.60 - 2.40 mg/dL   POCT GLUCOSE    Collection Time: 10/31/24  5:36 AM   Result Value Ref Range    POCT Glucose 298 (H) 74 - 99 mg/dL   POCT GLUCOSE    Collection Time: 10/31/24  6:14 AM   Result Value Ref Range    POCT Glucose 292 (H) 74 - 99 mg/dL   Renal Function Panel    Collection Time: 10/31/24  7:04 AM   Result Value Ref Range    Glucose 242 (H) 74 - 99 mg/dL    Sodium 131 (L) 136 - 145 mmol/L    Potassium 6.3 (HH) 3.5 - 5.3 mmol/L    Chloride 90 (L) 98 - 107  mmol/L    Bicarbonate 22 21 - 32 mmol/L    Anion Gap 25 (H) 10 - 20 mmol/L    Urea Nitrogen 78 (H) 6 - 23 mg/dL    Creatinine 5.98 (H) 0.50 - 1.05 mg/dL    eGFR 7 (L) >60 mL/min/1.73m*2    Calcium 8.7 8.6 - 10.6 mg/dL    Phosphorus 4.3 2.5 - 4.9 mg/dL    Albumin 3.4 3.4 - 5.0 g/dL   POCT GLUCOSE    Collection Time: 10/31/24  9:39 AM   Result Value Ref Range    POCT Glucose 222 (H) 74 - 99 mg/dL   Heparin Assay, UFH    Collection Time: 10/31/24  9:49 AM   Result Value Ref Range    Heparin Unfractionated 0.8 See Comment Below for Therapeutic Ranges IU/mL   POCT GLUCOSE    Collection Time: 10/31/24 11:58 AM   Result Value Ref Range    POCT Glucose 178 (H) 74 - 99 mg/dL     Inpatient Medications:  Scheduled medications   Medication Dose Route Frequency    amiodarone  200 mg oral Daily    [Held by provider] apixaban  5 mg oral BID    aspirin  81 mg oral Daily    atorvastatin  40 mg oral q AM    calcium acetate  1,334 mg oral TID    fluticasone furoate-vilanteroL  1 puff inhalation Daily    Glucommander - insulin glargine  1-125 Units subcutaneous Daily    And    Glucommander - insulin lispro  0-125 Units subcutaneous With meals & nightly    midodrine  5 mg oral TID    pantoprazole  40 mg oral Daily before breakfast    [START ON 11/1/2024] predniSONE  20 mg oral Daily    Followed by    [START ON 11/2/2024] predniSONE  10 mg oral Daily    sennosides-docusate sodium  1 tablet oral Nightly    sodium zirconium cyclosilicate  10 g oral q8h    vitamin B complex-vitamin C-folic acid  1 capsule oral Daily     PRN medications   Medication    acetaminophen    dextrose    glucagon    glucagon    glucagon HCL    Glucommander - insulin lispro    guaiFENesin    heparin    melatonin    ondansetron    polyethylene glycol     Continuous Medications   Medication Dose Last Rate    heparin  0-4,500 Units/hr 800 Units/hr (10/31/24 1108)     Procedures/testing:  TRANSTHORACIC ECHO (TTE) COMPLETE 10/29/2024   1. Left ventricular ejection  fraction is moderately decreased, calculated by Daniels's biplane at 31%.   2. There is global hypokinesis of the left ventricle with minor regional variations.   3. Left ventricular diastolic filling was indeterminate.   4. There is reduced right ventricular systolic function.   5. Moderately enlarged right ventricle.   6. The left atrium is mildly dilated.   7. There is moderate mitral annular calcification.   8. Mild to moderate tricuspid regurgitation visualized.   9. Moderately elevated right ventricular systolic pressure.  10. Severe calcific AS with gradients of 64/38.4mmHg and DI of 0.19 and mild AI. ( Note the gradients and VTI were averaged over 5 consecutive beats given atrial fibrillation ) consistent with severe AS.  11. There is severe aortic valve cusp calcification.  12. Mild aortic valve regurgitation.  13. No prior echocardioram available for comparison.    CT TAVR NO CONTRAST CHEST ABDOMEN PELVIS; 10/28/2024   1. Mild calcified atherosclerotic changes involving the thoracoabdominal aorta and its branches. Please note that, the assessment of vascular patency is not possible in absence of intravenous contrast.  2. Severe calcifications of the aortic valve correlating with history of aortic stenosis.  3. Moderate coronary artery calcifications, with suggestion of underlying stents. Please note,the study is not optimized for evaluation of coronary arteries.  4. Multiple solid non-calcified pulmonary nodules measuring up to 7mm. In absence of prior comparative examinations, to ensure stability, consider follow up non contrast chest CT at 3-6 months, then consider CT chest at 18-24 months.  5. Heterogenous thyroid gland with nodules measuring up to 1.3 cm. Further evaluation with nonemergent thyroid ultrasound is recommended.  6. Cholelithiasis without evidence of acute cholecystitis.  7. Moderate-sized fat containing periumbilical hernia.  8. Atrophic bilateral kidneys.    Telemetry 10/31/2024  (personally reviewed): Atrial fibrillation 60-80s    Physical exam:  General: NAD  Head/ neck: no JVD at 90 degrees  Cardiac: Irregular, S1 S2 , systolic murmur  Pulm: decreased breath sounds throughout, on 4L O2 NC (baseline 3L)  Vascular: Radial 2+ bilaterally  GI: Non distended  Extremities: +1/2 BLE edema, ACE wraps applied to bilateral lower legs  Neuro: no focal neuro deficits   Psych: appropriate mood and behavior, pleasant  Skin: Lukewarm and dry    Assessment/Plan   Faviola Pleitez is a 69 y.o. female with Hx of ESRD-HD, CAD with previous stent, lymphedema, anemia of chronic disease,  chronic respiratory failure (on 3LNC at home), MO, DM, HLP, HTN, recently diagnosed cardiomyopathy presenting from [facility] for evaluation of severe aortic valve stenosis. Also, diagnosed with rhinovirus/bronchitis and new onset afib.      Severe AS  - TTE 10/29/24: EF 31%, GHK, reduced RV systolic function, LA mildly dilated, mod mitral annular calcification, mod elevated RVSP, Severe calcific AS with gradients of 64/38.4mmHg and DI of 0.19 and mild AI. ( Note the gradients and VTI were averaged over 5 consecutive beats given atrial fibrillation ) consistent with severe aortic stenosis, severe AV cusp calc, mild AR  - CT TAVR without contrast 10/29  Mild calcified atherosclerotic changes involving the thoracoabdominal aorta and its branches. Severe calcifications of the aortic valve correlating with history of aortic stenosis. Moderate coronary artery calcifications, with suggestion of  underlying stents.   - CT Surgery felt patient not SAVR candidate. Patient prefers TAVR.   -  following, plan for TAVR on Tuesday 11/5 with Dr. Hart  -- repeat CT TAVR with full contrast completed this morning (previous was done without contrast, unable to safely plan procedure with non-con imaging)  -- Panorex revealed multifocal dental caries. No periapical lucency to suggest abscess  -- L/RHC today with Dr. Way     Newly  diagnosed CM, type unknown by echo 9/24/24  Acute systolic and diastolic heart failure, decompensated  - echo at OSH  9/27/24 EF 42%. Left Ventricle: Moderately reduced left ventricular systolic function. Moderate global hypokinesis present. Aortic Valve: Trileaflet valve. Severely calcified right and noncoronary cusps. Mild regurgitation. Severe stenosis of the aortic valve. AV mean gradient is 49 mmHg. AV area by continuity VTI is 0.6 cm2. Mitral Valve: Mild annular calcification at the posterior leaflet.  Increased E-point septal separation suggesting decreased forward cardiac output. Moderate regurgitation. Tricuspid Valve: Mild regurgitation. The estimated RVSP is 58 mmHg. Moderately elevated RVSP, consistent with moderate pulmonary hypertension.   Left atrium is mildly dilated.   - TTE 10/29/24: EF 31%, GHK, reduced RV systolic function, LA mildly dilated, mod mitral annular calcification, mod elevated RVSP, Severe calcific AS with gradients of 64/38.4mmHg and DI of 0.19 and mild AI. ( Note the gradients and VTI were averaged over 5 consecutive beats given atrial fibrillation ) consistent with severe aortic stenosis, severe AV cusp calc, mild AR  - admit CXR: Cardiomegaly and interstitial pulmonary edema. Correlate with volume status. Cannot exclude trace bibasilar pleural effusion.  - BNP 1284  - admit wt: 119 kg  - daily wt: 120 kg  - volume management with HD   - GDMT limited due to ESRD on HD  - Daily standing weights, strict I&O's    Newly diagnosed afib 10/25/24   - seen by cardiology at OSH, started on amio and eliquis  - afib rate controlled on tele  - EKG: atrial fibrillation with frequent PVCs 101 bpm  - 10/28 hold eliquis-- Eliquis is covered and copay is $4.60   - cont heparin gtt  - amiodarone reduced to 200mg daily    CAD  - followed with her cardiologist in [facility].  - Patient admitted with CP, found to have AS   - 2015 non-STEMI and underwent successful coronary artery  intervention with a DIANA to her LAD but unfortunately had diagonal ostium jailed with >70% stenosis.   - continue ASA, statin   - historically not on BB due to COPD  - Will need ischemic eval due to CP and new onset CM  - pending L/RHC today     Chronic respiratory failure   Rhinovirus/Bronchitis  Pulmonary nodules, incidental finding  - CT TAVR: Multiple solid non-calcified pulmonary nodules measuring up to 7 mm. In absence of prior comparative examinations, to ensure stability, consider follow up non contrast chest CT at 3-6 months,  then consider CT chest at 18-24 months.   - Baseline 3L NC at home, 4L inpatient  - Continue prednisone taper per OSH recs-adjusted inpatient for shorter course   - Continue dulera per OSH recs  - Continue guaifenesin PRN per OSH rec    - RT consult for chest therapy (unable to bring up secretions with cough)     ESRD on HD T,TH, Sat via left arm fistula  - 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  - nephrology following, HD 10/29, 10/31    Hyperkalemia  - 10/31 Hyperkalemic overnight, 6.3 with repeat 5.8. Was started on Lokelma + received 10U regular insulin, 25g D50 and D10W infusion. Repeat early this morning 6.3 x2.  - remains hyperkalemic following overnight interventions. Cardiac cath pushed back to this evening. Patient dialyzed prior to cath iso hyperkalemia  - repeat K following HD 5.3     Anemia of chronic disease  - Hgb 11.2 on admit      Acute Cellulitis   Hx lymphedema  - continue levaquin for 3 more day  - recently completed metronidazole course  - wound care consulted, wound care recs ordered     DM  - Glucose well controlled inpatient  - at OSH had poorly controlled BS due to prednisone and home lantus and meal time insulin increased  - Will lower lantus and give SS #1 inpatient  - Glucose elevated. Initiated glucommander     DVT ppx: heparin gtt  DISPO: complete TAVR evaluation, will stay in house for TAVR scheduled 11/5 with Hailey  Code status: Full Code    Patient seen  and discussed with Dr. Marin.    Donte Gutierrez PA-C     I conducted a shared care visit with the SONY, overall she feels improved today, reviewed testing showing critical aortic stenosis, will have repeat CT scan and coronary angiogram tentative TAVR on Tuesday if she continues to recover from rhino, she did have hyperkalemia which we acutely treated before dialysis in case she had procedure

## 2024-11-01 LAB
ACT BLD: 258 SEC (ref 83–199)
ACT BLD: 277 SEC (ref 83–199)
ACT BLD: 337 SEC (ref 83–199)
ALBUMIN SERPL BCP-MCNC: 3.6 G/DL (ref 3.4–5)
ANION GAP SERPL CALC-SCNC: 20 MMOL/L (ref 10–20)
ATRIAL RATE: 101 BPM
ATRIAL RATE: 681 BPM
BUN SERPL-MCNC: 44 MG/DL (ref 6–23)
CALCIUM SERPL-MCNC: 8.2 MG/DL (ref 8.6–10.6)
CHLORIDE SERPL-SCNC: 94 MMOL/L (ref 98–107)
CO2 SERPL-SCNC: 28 MMOL/L (ref 21–32)
CREAT SERPL-MCNC: 4.77 MG/DL (ref 0.5–1.05)
EGFRCR SERPLBLD CKD-EPI 2021: 9 ML/MIN/1.73M*2
ERYTHROCYTE [DISTWIDTH] IN BLOOD BY AUTOMATED COUNT: 17.1 % (ref 11.5–14.5)
GLUCOSE BLD MANUAL STRIP-MCNC: 171 MG/DL (ref 74–99)
GLUCOSE BLD MANUAL STRIP-MCNC: 213 MG/DL (ref 74–99)
GLUCOSE BLD MANUAL STRIP-MCNC: 225 MG/DL (ref 74–99)
GLUCOSE BLD MANUAL STRIP-MCNC: 229 MG/DL (ref 74–99)
GLUCOSE SERPL-MCNC: 229 MG/DL (ref 74–99)
HCT VFR BLD AUTO: 36.8 % (ref 36–46)
HGB BLD-MCNC: 11.5 G/DL (ref 12–16)
MAGNESIUM SERPL-MCNC: 2.23 MG/DL (ref 1.6–2.4)
MCH RBC QN AUTO: 32 PG (ref 26–34)
MCHC RBC AUTO-ENTMCNC: 31.3 G/DL (ref 32–36)
MCV RBC AUTO: 103 FL (ref 80–100)
NRBC BLD-RTO: 1.2 /100 WBCS (ref 0–0)
PHOSPHATE SERPL-MCNC: 3.7 MG/DL (ref 2.5–4.9)
PLATELET # BLD AUTO: 125 X10*3/UL (ref 150–450)
POTASSIUM SERPL-SCNC: 4.8 MMOL/L (ref 3.5–5.3)
PR INTERVAL: 224 MS
Q ONSET: 212 MS
Q ONSET: 214 MS
QRS COUNT: 12 BEATS
QRS COUNT: 17 BEATS
QRS DURATION: 102 MS
QRS DURATION: 106 MS
QT INTERVAL: 350 MS
QT INTERVAL: 442 MS
QTC CALCULATION(BAZETT): 453 MS
QTC CALCULATION(BAZETT): 486 MS
QTC FREDERICIA: 416 MS
QTC FREDERICIA: 472 MS
R AXIS: -10 DEGREES
R AXIS: 193 DEGREES
RBC # BLD AUTO: 3.59 X10*6/UL (ref 4–5.2)
SODIUM SERPL-SCNC: 137 MMOL/L (ref 136–145)
T AXIS: 16 DEGREES
T AXIS: 166 DEGREES
T OFFSET: 389 MS
T OFFSET: 433 MS
UFH PPP CHRO-ACNC: 0.5 IU/ML
VENTRICULAR RATE: 101 BPM
VENTRICULAR RATE: 73 BPM
WBC # BLD AUTO: 11.1 X10*3/UL (ref 4.4–11.3)

## 2024-11-01 PROCEDURE — 2500000005 HC RX 250 GENERAL PHARMACY W/O HCPCS

## 2024-11-01 PROCEDURE — C1769 GUIDE WIRE: HCPCS

## 2024-11-01 PROCEDURE — 82947 ASSAY GLUCOSE BLOOD QUANT: CPT

## 2024-11-01 PROCEDURE — 76937 US GUIDE VASCULAR ACCESS: CPT

## 2024-11-01 PROCEDURE — 2720000007 HC OR 272 NO HCPCS

## 2024-11-01 PROCEDURE — 75710 ARTERY X-RAYS ARM/LEG: CPT

## 2024-11-01 PROCEDURE — C1724 CATH, TRANS ATHEREC,ROTATION: HCPCS

## 2024-11-01 PROCEDURE — 2500000002 HC RX 250 W HCPCS SELF ADMINISTERED DRUGS (ALT 637 FOR MEDICARE OP, ALT 636 FOR OP/ED)

## 2024-11-01 PROCEDURE — 92978 ENDOLUMINL IVUS OCT C 1ST: CPT

## 2024-11-01 PROCEDURE — C9602 PERC D-E COR STENT ATHER S: HCPCS | Mod: 22,LD

## 2024-11-01 PROCEDURE — 1200000002 HC GENERAL ROOM WITH TELEMETRY DAILY

## 2024-11-01 PROCEDURE — 84520 ASSAY OF UREA NITROGEN: CPT

## 2024-11-01 PROCEDURE — 2780000003 HC OR 278 NO HCPCS

## 2024-11-01 PROCEDURE — 85347 COAGULATION TIME ACTIVATED: CPT

## 2024-11-01 PROCEDURE — 85520 HEPARIN ASSAY: CPT | Performed by: NURSE PRACTITIONER

## 2024-11-01 PROCEDURE — C1760 CLOSURE DEV, VASC: HCPCS

## 2024-11-01 PROCEDURE — 2500000001 HC RX 250 WO HCPCS SELF ADMINISTERED DRUGS (ALT 637 FOR MEDICARE OP)

## 2024-11-01 PROCEDURE — C1894 INTRO/SHEATH, NON-LASER: HCPCS

## 2024-11-01 PROCEDURE — 2500000004 HC RX 250 GENERAL PHARMACY W/ HCPCS (ALT 636 FOR OP/ED)

## 2024-11-01 PROCEDURE — 2550000001 HC RX 255 CONTRASTS

## 2024-11-01 PROCEDURE — C1889 IMPLANT/INSERT DEVICE, NOC: HCPCS

## 2024-11-01 PROCEDURE — 99152 MOD SED SAME PHYS/QHP 5/>YRS: CPT

## 2024-11-01 PROCEDURE — 99233 SBSQ HOSP IP/OBS HIGH 50: CPT | Performed by: INTERNAL MEDICINE

## 2024-11-01 PROCEDURE — 02703ZZ DILATION OF CORONARY ARTERY, ONE ARTERY, PERCUTANEOUS APPROACH: ICD-10-PCS

## 2024-11-01 PROCEDURE — C1874 STENT, COATED/COV W/DEL SYS: HCPCS

## 2024-11-01 PROCEDURE — C1725 CATH, TRANSLUMIN NON-LASER: HCPCS

## 2024-11-01 PROCEDURE — G0269 OCCLUSIVE DEVICE IN VEIN ART: HCPCS | Mod: 59

## 2024-11-01 PROCEDURE — C1753 CATH, INTRAVAS ULTRASOUND: HCPCS

## 2024-11-01 PROCEDURE — 99153 MOD SED SAME PHYS/QHP EA: CPT

## 2024-11-01 PROCEDURE — 027035Z DILATION OF CORONARY ARTERY, ONE ARTERY WITH TWO DRUG-ELUTING INTRALUMINAL DEVICES, PERCUTANEOUS APPROACH: ICD-10-PCS

## 2024-11-01 PROCEDURE — 36415 COLL VENOUS BLD VENIPUNCTURE: CPT

## 2024-11-01 PROCEDURE — 2500000001 HC RX 250 WO HCPCS SELF ADMINISTERED DRUGS (ALT 637 FOR MEDICARE OP): Performed by: NURSE PRACTITIONER

## 2024-11-01 PROCEDURE — 2500000004 HC RX 250 GENERAL PHARMACY W/ HCPCS (ALT 636 FOR OP/ED): Performed by: NURSE PRACTITIONER

## 2024-11-01 PROCEDURE — 92933 PRQ TRLML C ATHRC ST ANGIOP1: CPT

## 2024-11-01 PROCEDURE — 83735 ASSAY OF MAGNESIUM: CPT

## 2024-11-01 PROCEDURE — 02C03ZZ EXTIRPATION OF MATTER FROM CORONARY ARTERY, ONE ARTERY, PERCUTANEOUS APPROACH: ICD-10-PCS

## 2024-11-01 PROCEDURE — C1887 CATHETER, GUIDING: HCPCS

## 2024-11-01 PROCEDURE — C9601 PERC DRUG-EL COR STENT BRAN: HCPCS | Mod: 22,LM

## 2024-11-01 PROCEDURE — 4A023N7 MEASUREMENT OF CARDIAC SAMPLING AND PRESSURE, LEFT HEART, PERCUTANEOUS APPROACH: ICD-10-PCS

## 2024-11-01 PROCEDURE — B2111ZZ FLUOROSCOPY OF MULTIPLE CORONARY ARTERIES USING LOW OSMOLAR CONTRAST: ICD-10-PCS

## 2024-11-01 PROCEDURE — 85027 COMPLETE CBC AUTOMATED: CPT

## 2024-11-01 DEVICE — EVEROLIMUS-ELUTING PLATINUM CHROMIUM CORONARY STENT SYSTEM
Type: IMPLANTABLE DEVICE | Site: CORONARY | Status: FUNCTIONAL
Brand: SYNERGY™ XD

## 2024-11-01 RX ORDER — FENTANYL CITRATE 50 UG/ML
INJECTION, SOLUTION INTRAMUSCULAR; INTRAVENOUS AS NEEDED
Status: DISCONTINUED | OUTPATIENT
Start: 2024-11-01 | End: 2024-11-01 | Stop reason: HOSPADM

## 2024-11-01 RX ORDER — SODIUM CHLORIDE 9 MG/ML
INJECTION, SOLUTION INTRAVENOUS CONTINUOUS PRN
Status: DISCONTINUED | OUTPATIENT
Start: 2024-11-01 | End: 2024-11-01 | Stop reason: HOSPADM

## 2024-11-01 RX ORDER — CLOPIDOGREL BISULFATE 300 MG/1
TABLET, FILM COATED ORAL AS NEEDED
Status: DISCONTINUED | OUTPATIENT
Start: 2024-11-01 | End: 2024-11-01 | Stop reason: HOSPADM

## 2024-11-01 RX ORDER — CLOPIDOGREL BISULFATE 75 MG/1
75 TABLET ORAL DAILY
Status: DISCONTINUED | OUTPATIENT
Start: 2024-11-02 | End: 2024-11-10 | Stop reason: HOSPADM

## 2024-11-01 RX ORDER — HEPARIN SODIUM 1000 [USP'U]/ML
INJECTION, SOLUTION INTRAVENOUS; SUBCUTANEOUS AS NEEDED
Status: DISCONTINUED | OUTPATIENT
Start: 2024-11-01 | End: 2024-11-01 | Stop reason: HOSPADM

## 2024-11-01 RX ORDER — MIDAZOLAM HYDROCHLORIDE 1 MG/ML
INJECTION, SOLUTION INTRAMUSCULAR; INTRAVENOUS AS NEEDED
Status: DISCONTINUED | OUTPATIENT
Start: 2024-11-01 | End: 2024-11-01 | Stop reason: HOSPADM

## 2024-11-01 RX ORDER — LIDOCAINE HYDROCHLORIDE 10 MG/ML
INJECTION, SOLUTION EPIDURAL; INFILTRATION; INTRACAUDAL; PERINEURAL AS NEEDED
Status: DISCONTINUED | OUTPATIENT
Start: 2024-11-01 | End: 2024-11-01 | Stop reason: HOSPADM

## 2024-11-01 ASSESSMENT — COGNITIVE AND FUNCTIONAL STATUS - GENERAL
DAILY ACTIVITIY SCORE: 22
TURNING FROM BACK TO SIDE WHILE IN FLAT BAD: A LITTLE
TOILETING: A LITTLE
MOVING TO AND FROM BED TO CHAIR: A LITTLE
STANDING UP FROM CHAIR USING ARMS: A LITTLE
PERSONAL GROOMING: A LITTLE
MOBILITY SCORE: 18
STANDING UP FROM CHAIR USING ARMS: A LITTLE
WALKING IN HOSPITAL ROOM: A LITTLE
DAILY ACTIVITIY SCORE: 22
MOBILITY SCORE: 18
TURNING FROM BACK TO SIDE WHILE IN FLAT BAD: A LITTLE
TOILETING: A LITTLE
MOVING TO AND FROM BED TO CHAIR: A LITTLE
WALKING IN HOSPITAL ROOM: A LITTLE
CLIMB 3 TO 5 STEPS WITH RAILING: A LOT
PERSONAL GROOMING: A LITTLE
CLIMB 3 TO 5 STEPS WITH RAILING: A LOT

## 2024-11-01 ASSESSMENT — PAIN SCALES - GENERAL
PAINLEVEL_OUTOF10: 0 - NO PAIN

## 2024-11-01 ASSESSMENT — PAIN - FUNCTIONAL ASSESSMENT
PAIN_FUNCTIONAL_ASSESSMENT: 0-10

## 2024-11-01 NOTE — INTERVAL H&P NOTE
H&P reviewed. The patient was examined and there are no changes to the H&P. Reports improved cough and breathing this morning. Chronic O2 use, LUE HD access site, no HD access on RUE, chronic lymphedema.

## 2024-11-01 NOTE — PROGRESS NOTES
Pt eating a second dinner. BS obtained. Glucommander said to give her 3hours before administering anymore insulin. None given. Somehow, the programmed confirmed 2 doses given without a confirmation or a 'yes' from me. Reps assisted me in voiding one. A note was left concerning the other dose not being given.  BS/HS obtained 3 hours later around 2300. Program said not to give. Pt also NPO after midnight. Will continue to monitor.

## 2024-11-01 NOTE — CARE PLAN
The patient's goals for the shift include      The clinical goals for the shift include heart cath    Over the shift, the patient did make progress toward the following goals. Barriers to progression include none. Recommendations to address these barriers include n/a. 2 stents placed to the LAD via right femoral approach

## 2024-11-01 NOTE — POST-PROCEDURE NOTE
Physician Transition of Care Summary  Invasive Cardiovascular Lab    Procedure Date: 11/1/2024  Attending:    * Joesph Francois - Primary  Resident/Fellow/Other Assistant: Surgeons and Role:     * Noah Corona MD - Fellow    Indications:   Pre-op Diagnosis      * Severe aortic stenosis [I35.0]     * Rhinovirus [B34.8]    Post-procedure diagnosis:   Post-op Diagnosis     * Severe aortic stenosis [I35.0]     * Rhinovirus [B34.8]    Procedure(s):   Left & Right Heart Cath w Angiography & LV  64838 - SD R & L HRT CATH WINJX HRT ART& L VENTR IMG        Procedure Findings:   Severe 80-90% Lmain-prox LAD stenosis s/p DCB and DIANA x 1. 2 layers of DIANA in prox LAD which were under-expanded s/p POBA. Severe mid LAD stenosis 90% distal to prox LAD stent s/p DIANA x1    Severe 95% prox LCX disease - recommend medical management     Description of the Procedure:   R femoral access with 6F sheath s/p perclose. Bed rest for 2h    Complications:   none    Stents/Implants:   Cardiovascular Implants       Stent    Stent, Synergy Xd, Mr Us, 2.25 X 24mm - [id number] - Implanted        Inventory item: STENT, SYNERGY XDMR QUINONES, 2.25 X 24MM Model/Cat number: [id number]    : [employer] Lot number: [id number]    Device identifier: [id number]        As of 11/1/2024       Status: Implanted                      Stent, Synergy XMr Joselito nguyen, 3.00 X 16mm - [id number] - Implanted        Inventory item: STENT, SYNERGY XDMR QUINONES, 3.00 X 16MM Model/Cat number: [id number]    : [employer] Lot number: [id number]    Device identifier: [id number]        As of 11/1/2024       Status: Implanted                              Anticoagulation/Antiplatelet Plan:   Continue ASA 81mg and plavix 75mg daily. Loaded with plavix 600mg once in lab     Estimated Blood Loss:   10 mL    Anesthesia: Moderate Sedation Anesthesia Staff: No anesthesia staff entered.    Any Specimen(s) Removed:   No specimens collected  during this procedure.    Disposition:   floor      Electronically signed by: Seble Steele MD, 11/1/2024 2:16 PM

## 2024-11-01 NOTE — PROGRESS NOTES
Interval events:    No acute events overnight    Subjective:  During round this morning, patient reports feeling okay/anxious about heart cath today. Continues to promote dry cough; however, has been improving day by day.    Team reassessed patient following heart cath. Reports good understanding of interventions (see below).    Today in brief:  - LHC today: Severe 80-90% Lmain-prox LAD stenosis s/p DCB and DIANA x 1. 2 layers of DIANA in prox LAD which were under-expanded s/p POBA. Severe mid LAD stenosis 90% distal to prox LAD stent s/p DIANA x1. Severe 95% prox LCX disease - recommend medical management   - interventional fellow reached out to structural heart team who gave okay to proceed with LHC solely instead of L/RHC  - plavix 75mg to start tomorrow AM  - will restart heparin gtt 6hr post cath (19:30)  - remains hypervolemic following HD yesterday (removed 1.7L)-- scheduled for HD tomorrow and Monday prior to TAVR scheduled for Tuesday   - per SH: Inpatient TAVR 11/5/24 at 1630 with Dr. Hart. Will send type and screen on [facility] 11/3/24 (last T/S from 2023 was negative for antibodies), CBC + BMP + INR at 0400 on 11/5/24. Stop Heparin gtt at 0900 on 11/5/24  - improvement in hyperkalemia today following HD yesterday, K 4.8    Objective:  Vitals:    11/01/24 1440   BP: 94/62   Pulse: 70   Resp: 22   Temp: 35.9 °C (96.6 °F)   SpO2: 98%     Weight         10/28/2024  0829 10/28/2024  1118 10/29/2024  0447 10/30/2024  0447 11/1/2024  0437    Weight: 119 kg (262 lb 12.6 oz) 119 kg (262 lb 12.6 oz) 119 kg (262 lb 12.6 oz) 120 kg (264 lb 1.8 oz) 121 kg (266 lb 5.1 oz)            Intake/Output Summary (Last 24 hours) at 11/1/2024 1541  Last data filed at 11/1/2024 1411  Gross per 24 hour   Intake 1032.76 ml   Output 2201 ml   Net -1168.24 ml     Recent Results (from the past 24 hours)   POCT GLUCOSE    Collection Time: 10/31/24  5:51 PM   Result Value Ref Range    POCT Glucose 123 (H) 74 - 99 mg/dL   POCT GLUCOSE     Collection Time: 10/31/24  7:51 PM   Result Value Ref Range    POCT Glucose 289 (H) 74 - 99 mg/dL   Heparin Assay, UFH    Collection Time: 10/31/24  9:19 PM   Result Value Ref Range    Heparin Unfractionated 0.4 See Comment Below for Therapeutic Ranges IU/mL   POCT GLUCOSE    Collection Time: 10/31/24 11:10 PM   Result Value Ref Range    POCT Glucose 186 (H) 74 - 99 mg/dL   CBC    Collection Time: 11/01/24  5:34 AM   Result Value Ref Range    WBC 11.1 4.4 - 11.3 x10*3/uL    nRBC 1.2 (H) 0.0 - 0.0 /100 WBCs    RBC 3.59 (L) 4.00 - 5.20 x10*6/uL    Hemoglobin 11.5 (L) 12.0 - 16.0 g/dL    Hematocrit 36.8 36.0 - 46.0 %     (H) 80 - 100 fL    MCH 32.0 26.0 - 34.0 pg    MCHC 31.3 (L) 32.0 - 36.0 g/dL    RDW 17.1 (H) 11.5 - 14.5 %    Platelets 125 (L) 150 - 450 x10*3/uL   Renal Function Panel    Collection Time: 11/01/24  5:34 AM   Result Value Ref Range    Glucose 229 (H) 74 - 99 mg/dL    Sodium 137 136 - 145 mmol/L    Potassium 4.8 3.5 - 5.3 mmol/L    Chloride 94 (L) 98 - 107 mmol/L    Bicarbonate 28 21 - 32 mmol/L    Anion Gap 20 10 - 20 mmol/L    Urea Nitrogen 44 (H) 6 - 23 mg/dL    Creatinine 4.77 (H) 0.50 - 1.05 mg/dL    eGFR 9 (L) >60 mL/min/1.73m*2    Calcium 8.2 (L) 8.6 - 10.6 mg/dL    Phosphorus 3.7 2.5 - 4.9 mg/dL    Albumin 3.6 3.4 - 5.0 g/dL   Magnesium    Collection Time: 11/01/24  5:34 AM   Result Value Ref Range    Magnesium 2.23 1.60 - 2.40 mg/dL   Heparin Assay, UFH    Collection Time: 11/01/24  5:34 AM   Result Value Ref Range    Heparin Unfractionated 0.5 See Comment Below for Therapeutic Ranges IU/mL   POCT GLUCOSE    Collection Time: 11/01/24  6:35 AM   Result Value Ref Range    POCT Glucose 229 (H) 74 - 99 mg/dL   POCT GLUCOSE    Collection Time: 11/01/24  8:07 AM   Result Value Ref Range    POCT Glucose 225 (H) 74 - 99 mg/dL   ACTIVATED CLOTTING TIME LOW    Collection Time: 11/01/24 12:51 PM   Result Value Ref Range    POCT Activated Clotting Time Low Range 258 (H) 83 - 199 sec   ACTIVATED  CLOTTING TIME LOW    Collection Time: 11/01/24  1:21 PM   Result Value Ref Range    POCT Activated Clotting Time Low Range 277 (H) 83 - 199 sec   ACTIVATED CLOTTING TIME LOW    Collection Time: 11/01/24  2:00 PM   Result Value Ref Range    POCT Activated Clotting Time Low Range 337 (H) 83 - 199 sec     Inpatient Medications:  Scheduled medications   Medication Dose Route Frequency    amiodarone  200 mg oral Daily    [Held by provider] apixaban  5 mg oral BID    aspirin  81 mg oral Daily    atorvastatin  40 mg oral q AM    calcium acetate  1,334 mg oral TID    [START ON 11/2/2024] clopidogrel  75 mg oral Daily    fluticasone furoate-vilanteroL  1 puff inhalation Daily    Glucommander - insulin glargine  1-125 Units subcutaneous Daily    And    Glucommander - insulin lispro  0-125 Units subcutaneous With meals & nightly    midodrine  5 mg oral TID    pantoprazole  40 mg oral Daily before breakfast    [START ON 11/2/2024] predniSONE  10 mg oral Daily    sennosides-docusate sodium  1 tablet oral Nightly    vitamin B complex-vitamin C-folic acid  1 capsule oral Daily     PRN medications   Medication    acetaminophen    dextrose    glucagon    glucagon    glucagon HCL    Glucommander - insulin lispro    guaiFENesin    heparin    melatonin    ondansetron    polyethylene glycol     Continuous Medications   Medication Dose Last Rate    [Held by provider] heparin  0-4,500 Units/hr Stopped (11/01/24 1527)     Procedures/testing:  TRANSTHORACIC ECHO (TTE) COMPLETE 10/29/2024   1. Left ventricular ejection fraction is moderately decreased, calculated by Daniels's biplane at 31%.   2. There is global hypokinesis of the left ventricle with minor regional variations.   3. Left ventricular diastolic filling was indeterminate.   4. There is reduced right ventricular systolic function.   5. Moderately enlarged right ventricle.   6. The left atrium is mildly dilated.   7. There is moderate mitral annular calcification.   8. Mild to  moderate tricuspid regurgitation visualized.   9. Moderately elevated right ventricular systolic pressure.  10. Severe calcific AS with gradients of 64/38.4mmHg and DI of 0.19 and mild AI. ( Note the gradients and VTI were averaged over 5 consecutive beats given atrial fibrillation ) consistent with severe AS.  11. There is severe aortic valve cusp calcification.  12. Mild aortic valve regurgitation.  13. No prior echocardioram available for comparison.    CT TAVR NO CONTRAST CHEST ABDOMEN PELVIS; 10/28/2024   1. Mild calcified atherosclerotic changes involving the thoracoabdominal aorta and its branches. Please note that, the assessment of vascular patency is not possible in absence of intravenous contrast.  2. Severe calcifications of the aortic valve correlating with history of aortic stenosis.  3. Moderate coronary artery calcifications, with suggestion of underlying stents. Please note,the study is not optimized for evaluation of coronary arteries.  4. Multiple solid non-calcified pulmonary nodules measuring up to 7mm. In absence of prior comparative examinations, to ensure stability, consider follow up non contrast chest CT at 3-6 months, then consider CT chest at 18-24 months.  5. Heterogenous thyroid gland with nodules measuring up to 1.3 cm. Further evaluation with nonemergent thyroid ultrasound is recommended.  6. Cholelithiasis without evidence of acute cholecystitis.  7. Moderate-sized fat containing periumbilical hernia.  8. Atrophic bilateral kidneys.    Telemetry 11/1/2024 (personally reviewed): Atrial fibrillation 60-80s    Physical exam:  General: NAD  Head/ neck: no JVD at 90 degrees  Cardiac: Irregular, S1 S2 , systolic murmur  Pulm: decreased breath sounds throughout, on 3L O2 NC (baseline)  Vascular: Radial 2+ bilaterally  GI: Non distended. Ecchymosis along lower abdomen  Extremities: +1/2 BLE edema, ACE wraps applied to bilateral lower legs  Neuro: no focal neuro deficits   Psych: appropriate  mood and behavior, pleasant  Skin: Lukewarm and dry. R groin site dressing clean/dry/intact with small skin tear superior to dressing. No active bleeding/oozing from site. Non tender without signs of hematoma    Assessment/Plan   Faviola Pleitez is a 69 y.o. female with Hx of ESRD-HD, CAD with previous stent, lymphedema, anemia of chronic disease,  chronic respiratory failure (on 3LNC at home), MO, DM, HLP, HTN, recently diagnosed cardiomyopathy presenting from [facility] for evaluation of severe aortic valve stenosis. Also, diagnosed with rhinovirus/bronchitis and new onset afib.     Severe AS  - TTE 10/29/24: EF 31%, GHK, reduced RV systolic function, LA mildly dilated, mod mitral annular calcification, mod elevated RVSP, Severe calcific AS with gradients of 64/38.4mmHg and DI of 0.19 and mild AI. ( Note the gradients and VTI were averaged over 5 consecutive beats given atrial fibrillation ) consistent with severe aortic stenosis, severe AV cusp calc, mild AR  - CT TAVR without contrast 10/29  Mild calcified atherosclerotic changes involving the thoracoabdominal aorta and its branches. Severe calcifications of the aortic valve correlating with history of aortic stenosis. Moderate coronary artery calcifications, with suggestion of  underlying stents.   - CT Surgery felt patient not SAVR candidate. Patient prefers TAVR.   -  following, Inpatient TAVR 11/5/24 at 1630 with Dr. Hart  - Will send type and screen on [facility] 11/3/24 (last T/S from 2023 was negative for antibodies), CBC + BMP + INR at 0400 on 11/5/24. Stop Heparin gtt at 0900 on 11/5/24  -- repeat CT TAVR with full contrast completed, pending read (previous was done without contrast, unable to safely plan procedure with non-con imaging)  -- Panorex revealed multifocal dental caries. No periapical lucency to suggest abscess  -- [facility] 11/1: Severe 80-90% Lmain-prox LAD stenosis s/p DCB and DIANA x 1. 2 layers of DIANA in prox LAD which were under-expanded  s/p POBA. Severe mid LAD stenosis 90% distal to prox LAD stent s/p DIANA x1. Severe 95% prox LCX disease - recommend medical management (see below)     Newly diagnosed CM, type unknown by echo 9/24/24  Acute systolic and diastolic heart failure, decompensated  - echo at OSH  9/27/24 EF 42%. Left Ventricle: Moderately reduced left ventricular systolic function. Moderate global hypokinesis present. Aortic Valve: Trileaflet valve. Severely calcified right and noncoronary cusps. Mild regurgitation. Severe stenosis of the aortic valve. AV mean gradient is 49 mmHg. AV area by continuity VTI is 0.6 cm2. Mitral Valve: Mild annular calcification at the posterior leaflet.  Increased E-point septal separation suggesting decreased forward cardiac output. Moderate regurgitation. Tricuspid Valve: Mild regurgitation. The estimated RVSP is 58 mmHg. Moderately elevated RVSP, consistent with moderate pulmonary hypertension.   Left atrium is mildly dilated.   - TTE 10/29/24: EF 31%, GHK, reduced RV systolic function, LA mildly dilated, mod mitral annular calcification, mod elevated RVSP, Severe calcific AS with gradients of 64/38.4mmHg and DI of 0.19 and mild AI. ( Note the gradients and VTI were averaged over 5 consecutive beats given atrial fibrillation ) consistent with severe aortic stenosis, severe AV cusp calc, mild AR  - admit CXR: Cardiomegaly and interstitial pulmonary edema. Correlate with volume status. Cannot exclude trace bibasilar pleural effusion.  - BNP 1284  - admit wt: 119 kg  - daily wt: 121 kg  - volume management with HD   - GDMT limited due to ESRD on HD  - Daily standing weights, strict I&O's    Newly diagnosed afib 10/25/24   - seen by cardiology at OSH, started on amio and eliquis  - afib rate controlled on tele  - EKG: atrial fibrillation with frequent PVCs 101 bpm  - 10/28 hold eliquis-- Eliquis is covered and copay is $4.60   - cont heparin gtt  - amiodarone reduced to 200mg daily    CAD s/p PCI   - followed  with her cardiologist in [facility].  - Patient admitted with CP, found to have AS   - 2015 non-STEMI and underwent successful coronary artery intervention with a DIANA to her LAD but unfortunately had diagonal ostium jailed with >70% stenosis.   - [facility] 11/1: Severe 80-90% Lmain-prox LAD stenosis s/p DCB and DIANA x 1. 2 layers of DIANA in prox LAD which were under-expanded s/p POBA. Severe mid LAD stenosis 90% distal to prox LAD stent s/p DIANA x1. Severe 95% prox LCX disease - recommend medical management   - continue ASA, statin   - historically not on BB due to COPD  - plavix 75mg to start tomorrow AM     Chronic respiratory failure   Rhinovirus/Bronchitis  Pulmonary nodules, incidental finding  - CT TAVR: Multiple solid non-calcified pulmonary nodules measuring up to 7 mm. In absence of prior comparative examinations, to ensure stability, consider follow up non contrast chest CT at 3-6 months,  then consider CT chest at 18-24 months.   - Baseline 3L NC at home, 4L inpatient  - Continue prednisone taper per OSH recs-adjusted inpatient for shorter course   - Continue dulera per OSH recs  - Continue guaifenesin PRN per OSH rec    - RT consult for chest therapy      ESRD on HD T,TH, Sat via left arm fistula  - 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  - nephrology following, HD 10/29, 10/31  - remains hypervolemic following HD yesterday (removed 1.7L)-- scheduled for HD tomorrow and Monday prior to TAVR scheduled for Tuesday     Hyperkalemia  - 10/31 Hyperkalemic overnight, 6.3 with repeat 5.8. Was started on Lokelma + received 10U regular insulin, 25g D50 and D10W infusion. Repeat early this morning 6.3 x2.  - remains hyperkalemic following overnight interventions. Cardiac cath pushed back to this evening. Patient dialyzed prior to cath iso hyperkalemia-> repeat K following HD 5.3  - improvement in hyperkalemia today following HD yesterday, K 4.8     Anemia of chronic disease  - Hgb 11.2 on admit      Acute  Cellulitis  Hx lymphedema  - continue levaquin for 3 more day  - recently completed metronidazole course  - wound care consulted, wound care recs ordered     DM  - Glucose well controlled inpatient  - at OSH had poorly controlled BS due to prednisone and home lantus and meal time insulin increased  - Will lower lantus and give SS #1 inpatient  - Glucose elevated. Initiated glucommander     DVT ppx: heparin gtt-- will restart 6hr post cath (19:30)  DISPO: will stay in house for TAVR scheduled 11/5 with Hailey  Code status: Full Code    Patient seen and discussed with Dr. Marin.    Donte Gutierrez PA-C     I conducted a shared care visit the SONY, she continues to feel well, underwent coronary angiogram today with PCI to prox LAD lesion, will start DAPT, still planning for TAVR early next week

## 2024-11-01 NOTE — SIGNIFICANT EVENT
.Rapid Response Nurse Note: RADAR alert: 7    Pager time: [phone]  Arrival time: 1500  Event end time: 151  Location: T5    Rapid response initiated by:  [] Rapid response RN [] Family [] Nursing Supervisor [] Physician   [x] RADAR auto page [] Sepsis auto-page [] RN [] RT   [] NP/PA [] Other:     Primary reason for call:   [] BAT [] New CPAP/BiPAP [] Bleeding [] Change in mental status   [] Chest pain [] Code blue [] FiO2 >/= 50% [] HR </= 40 bpm   [] HR >/= 130 bpm [] Hyperglycemia [] Hypoglycemia [x] RADAR    [] RR </= 8 bpm [] RR >/= 30 bpm [] SBP </= 90 mmHg [] SpO2 < 90%   [] Seizure [] Sepsis [] Shortness of breath  [] Staff concern: see comments     Interventions:  [x] None [] ABG/VBG [] Assist w/ICU transfer [] BAT paged    [] Bag mask [] Blood [] Cardioversion [] Code Blue   [] Code blue for intubation [] Code status changed [] Chest x-ray [] EKG   [] IV fluid/bolus [] KUB x-ray [] Labs/cultures [] Medication   [] Nebulizer treatment [] NIPPV (CPAP/BiPAP) [] Oxygen [] Oral airway   [] Peripheral IV [] Palliative care consult [] CT/MRI [] Sepsis protocol    [] Suctioned [] Other:     Outcome:  [] Coded and  [] Code blue for intubation [] Coded and transferred to ICU []  on division   [x] Remained on division (no change) [] Remained on division + additional monitoring [] Remained in ED [] Transferred to ED   [] Transferred to ICU [] Transferred to inpatient status [] Transferred for interventions (procedure) [] Transferred to ICU stepdown    [] Transferred to surgery [] Transferred to telemetry [] Sepsis protocol [] STEMI protocol   [] Stroke protocol       Additional Comments: Notified nurse of RADAR page: [phone] 94/62 98%.  No concerns at this time; encouraged to call with changes.

## 2024-11-01 NOTE — TREATMENT PLAN
Structural Heart Plan of Care Note    We are following Ms. Pleitez for critical symptomatic AV stenosis      TAVR work-up checklist  - Dental - Cleared by Panorex  - ECHO - completed, critical AS  - LHC - completed today, pending signed report  - EBONY CTA (C/A/P) with IV con - complete, feasible for transfemoral access  - Cardiac Surgery consult - Completed    Recs/Plan:  - Inpatient TAVR 11/5/24 at 1630 with Dr. Hart   - Send type and screen on Ravinder 11/3/24 (last T/S from 2023 was negative for antibodies)  - Needs CBC, BMP, INR at 0400 on 11/5/24  - NPO at 0000 on 11/5/24  - Stop Heparin gtt at 0900 on 11/5/24    We will continue to follow, please contact us with any questions      Inderjit Smith MSN, AGACNP-BC  Acute Care Nurse Practitioner  Structural Heart / TAVR Team  Structural Pager: [phone]  (Nights) ED fellow pager: [phone]

## 2024-11-01 NOTE — DISCHARGE INSTRUCTIONS
CARDIAC CATHETERIZATION DISCHARGE INSTRUCTIONS (procedure done on 11/1/24)     FOR SUDDEN AND SEVERE CHEST PAIN, SHORTNESS OF BREATH, EXCESSIVE BLEEDING, SIGNS OF STROKE, OR CHANGES IN MENTAL STATUS YOU SHOULD CALL 911 IMMEDIATELY.     If your provider has prescribed aspirin and/or clopidogrel (Plavix), or prasugrel (Effient), or ticagrelor (Brilinta), DO NOT STOP THESE MEDICATIONS for any reason without talking to your cardiologist first. If any of these were prescribed, you must take them every day without missing a single dose. If you are getting low on these medications, contact your provider immediately for a refill.     FOR NEXT 24 HOURS  - Upon discharge, you should return home and rest for the remainder of the day and evening. You do not have to stay on bed rest but should not be very active.  It is recommended a responsible adult be with you for the first 24 hours after the procedure.    - No driving for 24 hours after procedure. Please arrange for someone to drive you home from the hospital today.     - Do not drive, operate machinery, or use power tools for 24 hours after your procedure.     - Do not make any legal decisions for 24 hours after your procedure.     - Do not drink alcoholic beverages for 24 hours after your procedure.    WOUND CARE   *FOR FEMORAL (LEG) ACCESS*  ·      Avoid heavy lifting (over 10 pounds) for 7 days, squatting or excessive bending for 2 days, and strenuous exercise for 7 days.  ·      No submerged bathing, swimming, or hot tubs for the next 7 days, or until fully healed.  ·      Avoid sexual activity for 3-4 days until any groin discomfort has ceased.     *FOR RADIAL (WRIST) ACCESS*  ·      No lifting more than 5 pounds or excessive use of the wrist for 24 hours - for example, treat your wrist as if it is sprained.  ·      Do not engage in vigorous activities (tennis, golf, bowling, weights) for at least 48 hours after the procedure.  ·      Do not submerge the wrist  for 7 days after the procedure.  ·      You should expect mild tingling in your hand and tenderness at the puncture site for up to 3 days.    - The transparent dressing should be removed from the site 24 hours after the procedure.  Wash the site gently with soap and water. Rinse well and pat dry. Keep the area clean and dry. You may apply a Band-Aid to the site. Avoid lotions, ointments, or powders until fully healed.     - You may shower the day after your procedure.      - It is normal to notice a small bruise around the puncture site and/or a small grape sized or smaller lump. Any large bruising or large lump warrants a call to the office.     - If bleeding should occur, lay down and apply pressure to the affected area for 10 minutes.  If the bleeding stops notify your physician.  If there is a large amount of bleeding or spurting of blood CALL 911 immediately.  DO NOT drive yourself to the hospital.    - You may experience some tenderness, bruising or minimal inflammation.  If you have any concerns, you may contact the Cath Lab or if any of these symptoms become excessive, contact your cardiologist or go to the emergency room.     OTHER INSTRUCTIONS  - You may take acetaminophen (Tylenol) as directed for discomfort.  If pain is not relieved with acetaminophen (Tylenol), contact your doctor.    - If you notice or experience any of the following, you should notify your doctor or seek medical attention  Chest pain or discomfort  Change in mental status or weakness in extremities.  Dizziness, light headedness, or feeling faint.  Change in the site where the procedure was performed, such as bleeding or an increased area of bruising or swelling.  Tingling, numbness, pain, or coolness in the leg/arm beyond the site where the procedure was performed.  Signs of infection (i.e. shaking chills, temperature > 100 degrees Fahrenheit, warmth, redness) in the leg/arm area where the procedure was performed.  Changes in  urination   Bloody or black stools  Vomiting blood  Severe nose bleeds  Any excessive bleeding    - If you DO NOT have an appointment with your cardiologist within 2-4 weeks following your procedure, please contact their office.        ####  POST VALVE PROCEDURE DISCHARGE INSTRUCTIONS   ####    - Please weigh yourself daily (first thing in the morning) and record reading  - Please take and record your blood pressure 2 times a day (at least 60-90 mins AFTER you take your meds)  PLEASE HAVE THESE READINGS AVAILABLE DURING YOUR FOLLOW-UP APPOINTMENTS!!    - NO DRIVING OR LIFTING/PULLING/PUSHING GREATER THAN 10 POUNDS FOR 1 WEEK FROM YOUR PROCEDURE DATE.    - A lab requisition has been provided for you to draw a CBC and BMP.  Please take this rec to your local lab to have your labs drawn between 4-7 days post discharge.     - You have been given the order requisition for the 1 month ECHO at the time of your discharge.  Please call and schedule this ECHO at your LOCAL center (no sooner than 23 days after your procedure date).    - You will have 2 (possibly 3 - if 1 week appointment available) appointments that are vital to your post procedure care, these will be scheduled for you IF YOU NEED TO CHANGE YOUR APPOINTMENT TIME/DATE, PLEASE CALL [phone]   - Please follow up with your PCP within 7-14 days of discharge  - You will follow up with your primary cardiologist in 6-10 weeks    **** No elective dental procedures or cleanings for 3 months post procedure - You will need dental prophylaxis (oral antibiotic) prior to dental work/cleanings for life *****    Please call the [facility] TEAM LINE if you have any questions or concerns - [phone] (M-F 9am-4pm)  On-Call Cardiology Fellow: [phone] (ONLY for urgent issues on nights/weekends/holidays)      ****   CALL PROVIDER IF:   ****  - Breathing faster than normal.   - Breathing harder than normal or having retractions.   - Fever of 100.4 F (38 C) or  higher.   - Chills  - Drinking less than normal.   - Urinating less than normal, over 1 day.   - Acting very sleepy and difficult to awaken.   - Vomiting (throwing up) and not able to eat or drink for 12 hours.   - 3 or more loose, watery bowel movements in 24 hours (diarrhea).    -Any new concerning symptoms.    - If you develop difficulty breathing, rash, hives, severe nausea, vomiting, light-headedness or any signs of infection, immediately contact your doctor and go to the nearest emergency room.      #####   MISC. HOME-GOING INFO   #####  - DO NOT drink any alcoholic drinks or take any non-prescriptive medications that contain alcohol for the first 24 hours.   - DO NOT make any important decisions for the first 24 hours.      ACTIVITY:  - You are advised to go directly home from the hospital.   - DO NOT lift anything heavier than 10 pounds for one week, this allows for proper healing of the groin.   - No excessive exercise or treadmill use for one week. You may walk and do stairs, slowly.   - No sexual activities for 24 hours after you arrive home.      WOUND CARE:  - If slight bleeding should occur at site, lie down and have someone apply firm pressure just above the puncture site for 5 minutes.  If it continues or is profuse, call 911. Always notify your doctor if bleeding occurs.   - Keep site clean and dry. Let air dry or you may use a simple bandaid.   - Gently cleanse the puncture site in your groin with soap and water only.   - You may experience some tenderness, bruising or minimal inflammation.  If you have any concerns, you may contact the Cath Lab or if any of these symptoms become excessive, contact your cardiologist or go to the emergency room.   - No tub baths, soaking, or swimming for one week.   - May shower the next day after your procedure.      DIET:  - You may resume your normal diet. However, be mindful of your sodium intake.  Ideally you should try to limit your daily intake of sodium to  2-3g a day      HEART FAILURE SPECIFIC INSTRUCTIONS:   - CALL 911 IF YOU HAVE ANY OF THE SIGNS AND SYMPTOMS OF HEART FAILURE:   1. Chest pain   2. Significant Shortness of breath   3. Fainting.   - Notify your physician immediately if you have shortness of breath; weight gain of 3 lbs. or more; fatigue and loss of energy; swelling of lower extremities or abdomen; dizziness or fainting; change of appetite; and frequent coughing.   - Daily weight on the same scale, same time after voiding and before eating.   - Maintain daily weight log.

## 2024-11-02 LAB
ALBUMIN SERPL BCP-MCNC: 3.4 G/DL (ref 3.4–5)
ANION GAP SERPL CALC-SCNC: 28 MMOL/L (ref 10–20)
APTT PPP: 124 SECONDS (ref 27–38)
BUN SERPL-MCNC: 62 MG/DL (ref 6–23)
CALCIUM SERPL-MCNC: 8 MG/DL (ref 8.6–10.6)
CHLORIDE SERPL-SCNC: 93 MMOL/L (ref 98–107)
CO2 SERPL-SCNC: 24 MMOL/L (ref 21–32)
CREAT SERPL-MCNC: 6.03 MG/DL (ref 0.5–1.05)
EGFRCR SERPLBLD CKD-EPI 2021: 7 ML/MIN/1.73M*2
ERYTHROCYTE [DISTWIDTH] IN BLOOD BY AUTOMATED COUNT: 17.5 % (ref 11.5–14.5)
ERYTHROCYTE [DISTWIDTH] IN BLOOD BY AUTOMATED COUNT: 18 % (ref 11.5–14.5)
GLUCOSE BLD MANUAL STRIP-MCNC: 106 MG/DL (ref 74–99)
GLUCOSE BLD MANUAL STRIP-MCNC: 157 MG/DL (ref 74–99)
GLUCOSE BLD MANUAL STRIP-MCNC: 213 MG/DL (ref 74–99)
GLUCOSE BLD MANUAL STRIP-MCNC: 219 MG/DL (ref 74–99)
GLUCOSE BLD MANUAL STRIP-MCNC: 233 MG/DL (ref 74–99)
GLUCOSE SERPL-MCNC: 114 MG/DL (ref 74–99)
HCT VFR BLD AUTO: 35 % (ref 36–46)
HCT VFR BLD AUTO: 35.7 % (ref 36–46)
HGB BLD-MCNC: 10.6 G/DL (ref 12–16)
HGB BLD-MCNC: 10.8 G/DL (ref 12–16)
MAGNESIUM SERPL-MCNC: 2.35 MG/DL (ref 1.6–2.4)
MCH RBC QN AUTO: 31.8 PG (ref 26–34)
MCH RBC QN AUTO: 32 PG (ref 26–34)
MCHC RBC AUTO-ENTMCNC: 29.7 G/DL (ref 32–36)
MCHC RBC AUTO-ENTMCNC: 30.9 G/DL (ref 32–36)
MCV RBC AUTO: 104 FL (ref 80–100)
MCV RBC AUTO: 107 FL (ref 80–100)
NRBC BLD-RTO: 0.8 /100 WBCS (ref 0–0)
NRBC BLD-RTO: 1 /100 WBCS (ref 0–0)
PHOSPHATE SERPL-MCNC: 4.7 MG/DL (ref 2.5–4.9)
PLATELET # BLD AUTO: 116 X10*3/UL (ref 150–450)
PLATELET # BLD AUTO: 124 X10*3/UL (ref 150–450)
POTASSIUM SERPL-SCNC: 5 MMOL/L (ref 3.5–5.3)
RBC # BLD AUTO: 3.33 X10*6/UL (ref 4–5.2)
RBC # BLD AUTO: 3.37 X10*6/UL (ref 4–5.2)
SODIUM SERPL-SCNC: 140 MMOL/L (ref 136–145)
UFH PPP CHRO-ACNC: 0.3 IU/ML
UFH PPP CHRO-ACNC: 0.6 IU/ML
UFH PPP CHRO-ACNC: 1 IU/ML
WBC # BLD AUTO: 10.1 X10*3/UL (ref 4.4–11.3)
WBC # BLD AUTO: 10.6 X10*3/UL (ref 4.4–11.3)

## 2024-11-02 PROCEDURE — 2500000001 HC RX 250 WO HCPCS SELF ADMINISTERED DRUGS (ALT 637 FOR MEDICARE OP)

## 2024-11-02 PROCEDURE — 85730 THROMBOPLASTIN TIME PARTIAL: CPT | Performed by: NURSE PRACTITIONER

## 2024-11-02 PROCEDURE — 2500000002 HC RX 250 W HCPCS SELF ADMINISTERED DRUGS (ALT 637 FOR MEDICARE OP, ALT 636 FOR OP/ED)

## 2024-11-02 PROCEDURE — 36415 COLL VENOUS BLD VENIPUNCTURE: CPT | Performed by: NURSE PRACTITIONER

## 2024-11-02 PROCEDURE — 83735 ASSAY OF MAGNESIUM: CPT

## 2024-11-02 PROCEDURE — 85027 COMPLETE CBC AUTOMATED: CPT | Performed by: NURSE PRACTITIONER

## 2024-11-02 PROCEDURE — 2500000001 HC RX 250 WO HCPCS SELF ADMINISTERED DRUGS (ALT 637 FOR MEDICARE OP): Performed by: NURSE PRACTITIONER

## 2024-11-02 PROCEDURE — 85027 COMPLETE CBC AUTOMATED: CPT

## 2024-11-02 PROCEDURE — 2500000005 HC RX 250 GENERAL PHARMACY W/O HCPCS

## 2024-11-02 PROCEDURE — 80069 RENAL FUNCTION PANEL: CPT

## 2024-11-02 PROCEDURE — 94640 AIRWAY INHALATION TREATMENT: CPT

## 2024-11-02 PROCEDURE — 99232 SBSQ HOSP IP/OBS MODERATE 35: CPT | Performed by: INTERNAL MEDICINE

## 2024-11-02 PROCEDURE — 90935 HEMODIALYSIS ONE EVALUATION: CPT | Performed by: INTERNAL MEDICINE

## 2024-11-02 PROCEDURE — 1200000002 HC GENERAL ROOM WITH TELEMETRY DAILY

## 2024-11-02 PROCEDURE — 85520 HEPARIN ASSAY: CPT | Performed by: NURSE PRACTITIONER

## 2024-11-02 PROCEDURE — 2500000002 HC RX 250 W HCPCS SELF ADMINISTERED DRUGS (ALT 637 FOR MEDICARE OP, ALT 636 FOR OP/ED): Performed by: NURSE PRACTITIONER

## 2024-11-02 PROCEDURE — 82947 ASSAY GLUCOSE BLOOD QUANT: CPT

## 2024-11-02 PROCEDURE — 2500000004 HC RX 250 GENERAL PHARMACY W/ HCPCS (ALT 636 FOR OP/ED): Performed by: NURSE PRACTITIONER

## 2024-11-02 RX ORDER — INSULIN GLARGINE 100 [IU]/ML
1-125 INJECTION, SOLUTION SUBCUTANEOUS DAILY
Status: DISCONTINUED | OUTPATIENT
Start: 2024-11-03 | End: 2024-11-08

## 2024-11-02 RX ORDER — INSULIN LISPRO 100 [IU]/ML
0-125 INJECTION, SOLUTION INTRAVENOUS; SUBCUTANEOUS
Status: DISCONTINUED | OUTPATIENT
Start: 2024-11-02 | End: 2024-11-08

## 2024-11-02 RX ORDER — INSULIN LISPRO 100 [IU]/ML
0-125 INJECTION, SOLUTION INTRAVENOUS; SUBCUTANEOUS AS NEEDED
Status: DISCONTINUED | OUTPATIENT
Start: 2024-11-02 | End: 2024-11-08

## 2024-11-02 ASSESSMENT — COGNITIVE AND FUNCTIONAL STATUS - GENERAL
HELP NEEDED FOR BATHING: A LITTLE
MOVING FROM LYING ON BACK TO SITTING ON SIDE OF FLAT BED WITH BEDRAILS: A LITTLE
MOBILITY SCORE: 16
MOVING TO AND FROM BED TO CHAIR: A LITTLE
DAILY ACTIVITIY SCORE: 20
CLIMB 3 TO 5 STEPS WITH RAILING: A LOT
TOILETING: A LITTLE
TURNING FROM BACK TO SIDE WHILE IN FLAT BAD: A LITTLE
DRESSING REGULAR UPPER BODY CLOTHING: A LITTLE
PERSONAL GROOMING: A LITTLE
WALKING IN HOSPITAL ROOM: A LOT
STANDING UP FROM CHAIR USING ARMS: A LITTLE

## 2024-11-02 ASSESSMENT — PAIN SCALES - GENERAL
PAINLEVEL_OUTOF10: 0 - NO PAIN

## 2024-11-02 ASSESSMENT — PAIN - FUNCTIONAL ASSESSMENT
PAIN_FUNCTIONAL_ASSESSMENT: 0-10
PAIN_FUNCTIONAL_ASSESSMENT: NO/DENIES PAIN
PAIN_FUNCTIONAL_ASSESSMENT: UNABLE TO SELF-REPORT

## 2024-11-02 NOTE — NURSING NOTE
Report to Receiving RN:    Report To: JENNIFER Schulz  Time Report Called: 0098  Hand-Off Communication: Pt completed 4 hrs HD, 2L fluid removed, /52, HR 72, tolerated tx, pt stable, A/O.  Complications During Treatment: No  Ultrafiltration Treatment: Yes  Medications Administered During Dialysis: No  Blood Products Administered During Dialysis: No  Labs Sent During Dialysis: No  Heparin Drip Rate Changes: No      Last Updated: 1:18 PM by RADHA VALDEZ

## 2024-11-02 NOTE — NURSING NOTE
.Report from Sending RN:    Report From: JENNIFER Leonard  Recent Surgery of Procedure: Yes, Heart cath  Baseline Level of Consciousness (LOC): A&O X3  Oxygen Use: Yes  Type: 2 Liter  Diabetic: Yes  Last BP Med Given Day of Dialysis: none  Last Pain Med Given: none  Lab Tests to be Obtained with Dialysis: No  Blood Transfusion to be Given During Dialysis: No  Available IV Access:   Medications to be Administered During Dialysis: No  Continuous IV Infusion Running: Yes, heparin 6 ml/hr  Restraints on Currently or in the Last 24 Hours: No  Hand-Off Communication: Pt had no acute event overnight, vital signs stable. Pt on droplet precaution, no morning medication given.  Dialysis Catheter Dressing: AVF  Last Dressing Change: N/A

## 2024-11-02 NOTE — PROGRESS NOTES
Aptt accidentally released and drawn. Unable to cancel the result without drawing the lab again from the pt. Aptt resulted. Assay added on.

## 2024-11-02 NOTE — PROGRESS NOTES
Interval events:    No acute events overnight    Subjective:  Seen after dialysis feels ok  Oxygen to 2 liters     Today in brief:  - LHC today: Severe 80-90% Lmain-prox LAD stenosis s/p DCB and DIANA x 1. 2 layers of DIANA in prox LAD which were under-expanded s/p POBA. Severe mid LAD stenosis 90% distal to prox LAD stent s/p DIANA x1. Severe 95% prox LCX disease - recommend medical management   - plavix 75mg daily   - c/w with heparin   - Femoral groin site cath site examined ok   -  hypervolemic following HD for diuresis -- scheduled for HD today and Monday prior to TAVR scheduled for Tuesday   - per SH: Inpatient TAVR 11/5/24 at 1630 with Dr. Hart. Will send type and screen on Ravinder 11/3/24 (last T/S from 2023 was negative for antibodies), CBC + BMP + INR at 0400 on 11/5/24. Stop Heparin gtt at 0900 on 11/5/24  - improvement in hyperkalemia today following HD yesterday, K 4.8    Objective:  Vitals:    11/02/24 1534   BP: 92/59   Pulse: 53   Resp: 18   Temp: 36 °C (96.8 °F)   SpO2: 98%     Weight         10/28/2024  1118 10/29/2024  0447 10/30/2024  0447 11/1/2024  0437 11/2/2024  0628    Weight: 119 kg (262 lb 12.6 oz) 119 kg (262 lb 12.6 oz) 120 kg (264 lb 1.8 oz) 121 kg (266 lb 5.1 oz) 122 kg (269 lb 2.9 oz)            Intake/Output Summary (Last 24 hours) at 11/2/2024 1642  Last data filed at 11/2/2024 1200  Gross per 24 hour   Intake 1346.3 ml   Output 2531 ml   Net -1184.7 ml     Recent Results (from the past 24 hours)   POCT GLUCOSE    Collection Time: 11/01/24  8:04 PM   Result Value Ref Range    POCT Glucose 213 (H) 74 - 99 mg/dL   POCT GLUCOSE    Collection Time: 11/01/24 11:59 PM   Result Value Ref Range    POCT Glucose 233 (H) 74 - 99 mg/dL   CBC    Collection Time: 11/02/24 12:01 AM   Result Value Ref Range    WBC 10.6 4.4 - 11.3 x10*3/uL    nRBC 0.8 (H) 0.0 - 0.0 /100 WBCs    RBC 3.37 (L) 4.00 - 5.20 x10*6/uL    Hemoglobin 10.8 (L) 12.0 - 16.0 g/dL    Hematocrit 35.0 (L) 36.0 - 46.0 %     (H)  80 - 100 fL    MCH 32.0 26.0 - 34.0 pg    MCHC 30.9 (L) 32.0 - 36.0 g/dL    RDW 17.5 (H) 11.5 - 14.5 %    Platelets 124 (L) 150 - 450 x10*3/uL   aPTT - baseline    Collection Time: 11/02/24 12:01 AM   Result Value Ref Range    aPTT 124 (HH) 27 - 38 seconds   Heparin Assay, UFH    Collection Time: 11/02/24  1:51 AM   Result Value Ref Range    Heparin Unfractionated 1.0 See Comment Below for Therapeutic Ranges IU/mL   CBC    Collection Time: 11/02/24  5:55 AM   Result Value Ref Range    WBC 10.1 4.4 - 11.3 x10*3/uL    nRBC 1.0 (H) 0.0 - 0.0 /100 WBCs    RBC 3.33 (L) 4.00 - 5.20 x10*6/uL    Hemoglobin 10.6 (L) 12.0 - 16.0 g/dL    Hematocrit 35.7 (L) 36.0 - 46.0 %     (H) 80 - 100 fL    MCH 31.8 26.0 - 34.0 pg    MCHC 29.7 (L) 32.0 - 36.0 g/dL    RDW 18.0 (H) 11.5 - 14.5 %    Platelets 116 (L) 150 - 450 x10*3/uL   Renal Function Panel    Collection Time: 11/02/24  5:55 AM   Result Value Ref Range    Glucose 114 (H) 74 - 99 mg/dL    Sodium 140 136 - 145 mmol/L    Potassium 5.0 3.5 - 5.3 mmol/L    Chloride 93 (L) 98 - 107 mmol/L    Bicarbonate 24 21 - 32 mmol/L    Anion Gap 28 (H) 10 - 20 mmol/L    Urea Nitrogen 62 (H) 6 - 23 mg/dL    Creatinine 6.03 (H) 0.50 - 1.05 mg/dL    eGFR 7 (L) >60 mL/min/1.73m*2    Calcium 8.0 (L) 8.6 - 10.6 mg/dL    Phosphorus 4.7 2.5 - 4.9 mg/dL    Albumin 3.4 3.4 - 5.0 g/dL   Magnesium    Collection Time: 11/02/24  5:55 AM   Result Value Ref Range    Magnesium 2.35 1.60 - 2.40 mg/dL   Heparin Assay, UFH    Collection Time: 11/02/24  5:55 AM   Result Value Ref Range    Heparin Unfractionated 0.6 See Comment Below for Therapeutic Ranges IU/mL   POCT GLUCOSE    Collection Time: 11/02/24  1:24 PM   Result Value Ref Range    POCT Glucose 106 (H) 74 - 99 mg/dL   POCT GLUCOSE    Collection Time: 11/02/24  3:29 PM   Result Value Ref Range    POCT Glucose 157 (H) 74 - 99 mg/dL   Heparin Assay, UFH    Collection Time: 11/02/24  3:32 PM   Result Value Ref Range    Heparin Unfractionated 0.3 See  Comment Below for Therapeutic Ranges IU/mL     Inpatient Medications:  Scheduled medications   Medication Dose Route Frequency    amiodarone  200 mg oral Daily    [Held by provider] apixaban  5 mg oral BID    aspirin  81 mg oral Daily    atorvastatin  40 mg oral q AM    calcium acetate  1,334 mg oral TID    clopidogrel  75 mg oral Daily    fluticasone furoate-vilanteroL  1 puff inhalation Daily    [START ON 11/3/2024] Glucommander - insulin glargine  1-125 Units subcutaneous Daily    And    Glucommander - insulin lispro  0-125 Units subcutaneous With meals & nightly    midodrine  5 mg oral TID    oxygen   inhalation Continuous - Inhalation    pantoprazole  40 mg oral Daily before breakfast    sennosides-docusate sodium  1 tablet oral Nightly    vitamin B complex-vitamin C-folic acid  1 capsule oral Daily     PRN medications   Medication    acetaminophen    dextrose    glucagon    glucagon    glucagon HCL    Glucommander - insulin lispro    guaiFENesin    heparin    melatonin    ondansetron    polyethylene glycol     Continuous Medications   Medication Dose Last Rate    heparin  0-4,500 Units/hr 600 Units/hr (11/02/24 0225)     Procedures/testing:  TRANSTHORACIC ECHO (TTE) COMPLETE 10/29/2024   1. Left ventricular ejection fraction is moderately decreased, calculated by Daniels's biplane at 31%.   2. There is global hypokinesis of the left ventricle with minor regional variations.   3. Left ventricular diastolic filling was indeterminate.   4. There is reduced right ventricular systolic function.   5. Moderately enlarged right ventricle.   6. The left atrium is mildly dilated.   7. There is moderate mitral annular calcification.   8. Mild to moderate tricuspid regurgitation visualized.   9. Moderately elevated right ventricular systolic pressure.  10. Severe calcific AS with gradients of 64/38.4mmHg and DI of 0.19 and mild AI. ( Note the gradients and VTI were averaged over 5 consecutive beats given atrial  fibrillation ) consistent with severe AS.  11. There is severe aortic valve cusp calcification.  12. Mild aortic valve regurgitation.  13. No prior echocardioram available for comparison.    CT TAVR NO CONTRAST CHEST ABDOMEN PELVIS; 10/28/2024   1. Mild calcified atherosclerotic changes involving the thoracoabdominal aorta and its branches. Please note that, the assessment of vascular patency is not possible in absence of intravenous contrast.  2. Severe calcifications of the aortic valve correlating with history of aortic stenosis.  3. Moderate coronary artery calcifications, with suggestion of underlying stents. Please note,the study is not optimized for evaluation of coronary arteries.  4. Multiple solid non-calcified pulmonary nodules measuring up to 7mm. In absence of prior comparative examinations, to ensure stability, consider follow up non contrast chest CT at 3-6 months, then consider CT chest at 18-24 months.  5. Heterogenous thyroid gland with nodules measuring up to 1.3 cm. Further evaluation with nonemergent thyroid ultrasound is recommended.  6. Cholelithiasis without evidence of acute cholecystitis.  7. Moderate-sized fat containing periumbilical hernia.  8. Atrophic bilateral kidneys.    Telemetry 11/2/2024 (personally reviewed): Atrial fibrillation 60-80s    Physical exam:  General: NAD  Head/ neck: no JVD at 90 degrees  Cardiac: Irregular, S1 S2 , systolic murmur  Pulm: decreased breath sounds throughout, on 3L O2 NC (baseline)  Vascular: Radial 2+ bilaterally  GI: Non distended. Ecchymosis along lower abdomen  Extremities: +1/2 BLE edema, ACE wraps applied to bilateral lower legs   right groin dressing cath site soft and clean   Neuro: no focal neuro deficits   Psych: appropriate mood and behavior, pleasant  Skin: Lukewarm and dry. R groin site dressing clean/dry/intact with small skin tear superior to dressing. No active bleeding/oozing from site. Non tender without signs of  hematoma    Assessment/Plan   Faviola Pleitze is a 69 y.o. female with Hx of ESRD-HD, CAD with previous stent, lymphedema, anemia of chronic disease,  chronic respiratory failure (on 3LNC at home), MO, DM, HLP, HTN, recently diagnosed cardiomyopathy presenting from [facility] for evaluation of severe aortic valve stenosis. Also, diagnosed with rhinovirus/bronchitis and new onset afib.     Severe AS  - TTE 10/29/24: EF 31%, GHK, reduced RV systolic function, LA mildly dilated, mod mitral annular calcification, mod elevated RVSP, Severe calcific AS with gradients of 64/38.4mmHg and DI of 0.19 and mild AI. ( Note the gradients and VTI were averaged over 5 consecutive beats given atrial fibrillation ) consistent with severe aortic stenosis, severe AV cusp calc, mild AR  - CT TAVR without contrast 10/29  Mild calcified atherosclerotic changes involving the thoracoabdominal aorta and its branches. Severe calcifications of the aortic valve correlating with history of aortic stenosis. Moderate coronary artery calcifications, with suggestion of  underlying stents.   - CT Surgery felt patient not SAVR candidate. Patient prefers TAVR.   -  following, Inpatient TAVR 11/5/24 at 1630 with Dr. Hart  - Will send type and screen on [facility]inder 11/3/24 (last T/S from 2023 was negative for antibodies), CBC + BMP + INR at 0400 on 11/5/24. Stop Heparin gtt at 0900 on 11/5/24  -- repeat CT TAVR with full contrast completed, pending read (previous was done without contrast, unable to safely plan procedure with non-con imaging)  -- Panorex revealed multifocal dental caries. No periapical lucency to suggest abscess  -- [facility] 11/1: Severe 80-90% Lmain-prox LAD stenosis s/p DCB and DIANA x 1. 2 layers of DIANA in prox LAD which were under-expanded s/p POBA. Severe mid LAD stenosis 90% distal to prox LAD stent s/p DIANA x1. Severe 95% prox LCX disease - recommend medical management (see below)     Newly diagnosed CM, type unknown by echo  9/24/24  Acute systolic and diastolic heart failure, decompensated  - echo at OSH  9/27/24 EF 42%. Left Ventricle: Moderately reduced left ventricular systolic function. Moderate global hypokinesis present. Aortic Valve: Trileaflet valve. Severely calcified right and noncoronary cusps. Mild regurgitation. Severe stenosis of the aortic valve. AV mean gradient is 49 mmHg. AV area by continuity VTI is 0.6 cm2. Mitral Valve: Mild annular calcification at the posterior leaflet.  Increased E-point septal separation suggesting decreased forward cardiac output. Moderate regurgitation. Tricuspid Valve: Mild regurgitation. The estimated RVSP is 58 mmHg. Moderately elevated RVSP, consistent with moderate pulmonary hypertension.   Left atrium is mildly dilated.   - TTE 10/29/24: EF 31%, GHK, reduced RV systolic function, LA mildly dilated, mod mitral annular calcification, mod elevated RVSP, Severe calcific AS with gradients of 64/38.4mmHg and DI of 0.19 and mild AI. ( Note the gradients and VTI were averaged over 5 consecutive beats given atrial fibrillation ) consistent with severe aortic stenosis, severe AV cusp calc, mild AR  - admit CXR: Cardiomegaly and interstitial pulmonary edema. Correlate with volume status. Cannot exclude trace bibasilar pleural effusion.  - BNP 1284  - admit wt: 119 kg  - daily wt: 121 kg  - volume management with HD   - GDMT limited due to ESRD on HD  - Daily standing weights, strict I&O's    Newly diagnosed afib 10/25/24   - seen by cardiology at OSH, started on amio and eliquis  - afib rate controlled on tele  - EKG: atrial fibrillation with frequent PVCs 101 bpm  - 10/28 hold eliquis-- Eliquis is covered and copay is $4.60   - cont heparin gtt  - amiodarone reduced to 200mg daily    CAD s/p PCI   - followed with her cardiologist in [facility].  - Patient admitted with CP, found to have AS   - 2015 non-STEMI and underwent successful coronary artery intervention with a DIANA to her LAD  but unfortunately had diagonal ostium jailed with >70% stenosis.   - [facility] 11/1: Severe 80-90% Lmain-prox LAD stenosis s/p DCB and DIANA x 1. 2 layers of DIANA in prox LAD which were under-expanded s/p POBA. Severe mid LAD stenosis 90% distal to prox LAD stent s/p DIANA x1. Severe 95% prox LCX disease - recommend medical management   - continue ASA, statin   - historically not on BB due to COPD  - plavix 75mg daily      Chronic respiratory failure   Rhinovirus/Bronchitis  Pulmonary nodules, incidental finding  - CT TAVR: Multiple solid non-calcified pulmonary nodules measuring up to 7 mm. In absence of prior comparative examinations, to ensure stability, consider follow up non contrast chest CT at 3-6 months,  then consider CT chest at 18-24 months.   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 Was started on Lokelma + received 10U regular insulin, 25g D50 and D10W infusion. Repeat early this morning 6.3 x2.  - remains hyperkalemic following overnight interventions. Cardiac cath pushed back to this evening. Patient dialyzed prior to cath iso hyperkalemia-> repeat K following HD 5.3  - HD today , K 5 prior to dialysis      Anemia of chronic disease  - Hgb 11.2 on admit      Acute Cellulitis  Hx lymphedema  - continue levaquin for 3 more day  - recently completed metronidazole course  - wound care consulted, wound care recs ordered     DM  - Glucose well controlled inpatient  - at OSH had poorly controlled BS due to  prednisone and home lantus and meal time insulin increased  - Will lower lantus and give SS #1 inpatient  - Glucose elevated. Initiated glucommander     DVT ppx: heparin gtt--   DISPO: will stay in house for TAVR scheduled 11/5 with Hailey  Code status: Full Code    Patient seen and discussed with Dr. Marin.    Vivian Ott, APRN-CNP     I conducted a shared care visit with the SONY, she underwent dialysis today otherwise no changes this weekend, diabetes should improve as we taper off the prednisone

## 2024-11-02 NOTE — PROGRESS NOTES
"Faviola Pleitez is a 69 y.o. female on day 5 of admission presenting with Severe aortic stenosis.    Subjective   Patient seen this AM while at HD. Patient reported some minor chest discomfort  at times this morning and some GERD but no active chest discomfort. She also notes some reflux but did not get her PPI this morning yet.       Objective   Constitutional: Well developed, awake/alert/oriented x3, no distress, cooperative  Eyes: PERRL, EOMI, clear sclera  ENMT: mucous membranes moist  Head/Neck: Neck supple, No JVD  Respiratory/Thorax:  decreased bilaterally   Cardiovascular: Regular rate and rhythm, 3/6 JAVON, normal S1 and S2  Gastrointestinal: Obese, oft, non-tender, no rebound tenderness or guarding, diffuse chronic ecchymoses  Extremities: trace BLE edema, no cyanosis, right groin with no s/s of hematoma, dressing with minor serosanguinous discharge, no bleeding.  Neurological: alert and oriented x3, intact senses, motor, response and reflexes, normal strength  Psychological: Appropriate mood and behavior   Skin: Warm and dry, intact.     Last Recorded Vitals  Blood pressure 95/61, pulse 56, temperature 35.3 °C (95.6 °F), temperature source Temporal, resp. rate 18, height 1.626 m (5' 4\"), weight 122 kg (269 lb 2.9 oz), SpO2 99%.  Intake/Output last 3 Shifts:  I/O last 3 completed shifts:  In: 779.1 (6.4 mL/kg) [P.O.:480; I.V.:299.1 (2.4 mL/kg)]  Out: 110 (0.9 mL/kg) [Emesis/NG output:100; Blood:10]  Weight: 122.1 kg     Relevant Results                 Last Labs:  CBC - 11/2/2024:  5:55 AM  10.1 10.6 116    35.7      BMP  140  93  62                  ----------------<114     5.0  24  6.03     CMP - 11/2/2024:  5:55 AM  8.0 6.2 22 --- 0.5   4.7 3.4 14 94      PTT - 11/2/2024: 12:01 AM  1.6   18.6 124     BNP   Date/Time Value Ref Range Status   10/28/2024 09:50 AM 1,284 (H) 0 - 99 pg/mL Final                  Assessment/Plan   Assessment & Plan  Severe aortic stenosis    Rhinovirus    Aortic " stenosis  Faviola Pleitez is a 69 y.o. female with Hx of ESRD-HD, CAD with previous stent, lymphedema, anemia of chronic disease,  chronic respiratory failure (on 3LNC at home), MO, DM, HLP, HTN, recently diagnosed cardiomyopathy presenting from [facility] for evaluation of severe aortic valve stenosis. Also, diagnosed with rhinovirus/bronchitis and new onset afib. Underwent LHC on 11/1 as part of EBONY work-up which revealed 2VD and underwent PCI.      11/1  Severe 80-90% Lmain-prox LAD stenosis s/p DCB and DIANA x 1. 2 layers of DIANA in prox LAD which were under-expanded s/p POBA. Severe mid LAD stenosis 90% distal to prox LAD stent s/p DIANA x1     Severe 95% prox LCX disease - recommend medical management    R femoral access with 6F sheath s/p perclose     Continue ASA 81mg daily and plavix 75mg daily for at least 6 months and aspirin alone for life thereafter.    Interventional Recs:   - cont telemetry care per PCI protocol  - OK to discharge from interventional perspective   - cont DAPT with aspirin and plavix  - provided education on compliance with DAPT  - at discharge, PLEASE send 90 day prescription of plavix to [facility] (for price check and Meds to Beds) and remaining refills to patient's home pharmacy   - continue high intensity statin, BB  - please ensure cardiology follow up appointment after discharge in 1-3 weeks  - patient referred to cardiac rehab   - no lifting anything heavier than 10 lbs for one week for groin access        Interventional cardiology will sign off, please do not hesitate to call with questions or concerns.           I spent 60 minutes in the professional and overall care of this patient.      Rui Parkash MD

## 2024-11-02 NOTE — PROCEDURES
"DIALYSIS NOTE:    Seen during hemodialysis, undergoing treatment per submitted orders: 2 K, 2.5 Ca, 3.75  hours. Fluid removal 2 liters as tolerated (keep SBP> 90mmHg).     BP 95/61 (BP Location: Right arm, Patient Position: Lying)   Pulse 56   Temp 35.3 °C (95.6 °F) (Temporal)   Resp 18   Ht 1.626 m (5' 4\")   Wt 122 kg (269 lb 2.9 oz)   SpO2 99%   BMI 46.20 kg/m²     Recent Results (from the past 24 hours)   ACTIVATED CLOTTING TIME LOW    Collection Time: 11/01/24 12:51 PM   Result Value Ref Range    POCT Activated Clotting Time Low Range 258 (H) 83 - 199 sec   ACTIVATED CLOTTING TIME LOW    Collection Time: 11/01/24  1:21 PM   Result Value Ref Range    POCT Activated Clotting Time Low Range 277 (H) 83 - 199 sec   ACTIVATED CLOTTING TIME LOW    Collection Time: 11/01/24  2:00 PM   Result Value Ref Range    POCT Activated Clotting Time Low Range 337 (H) 83 - 199 sec   POCT GLUCOSE    Collection Time: 11/01/24  3:41 PM   Result Value Ref Range    POCT Glucose 171 (H) 74 - 99 mg/dL   POCT GLUCOSE    Collection Time: 11/01/24  8:04 PM   Result Value Ref Range    POCT Glucose 213 (H) 74 - 99 mg/dL   POCT GLUCOSE    Collection Time: 11/01/24 11:59 PM   Result Value Ref Range    POCT Glucose 233 (H) 74 - 99 mg/dL   CBC    Collection Time: 11/02/24 12:01 AM   Result Value Ref Range    WBC 10.6 4.4 - 11.3 x10*3/uL    nRBC 0.8 (H) 0.0 - 0.0 /100 WBCs    RBC 3.37 (L) 4.00 - 5.20 x10*6/uL    Hemoglobin 10.8 (L) 12.0 - 16.0 g/dL    Hematocrit 35.0 (L) 36.0 - 46.0 %     (H) 80 - 100 fL    MCH 32.0 26.0 - 34.0 pg    MCHC 30.9 (L) 32.0 - 36.0 g/dL    RDW 17.5 (H) 11.5 - 14.5 %    Platelets 124 (L) 150 - 450 x10*3/uL   aPTT - baseline    Collection Time: 11/02/24 12:01 AM   Result Value Ref Range    aPTT 124 (HH) 27 - 38 seconds   Heparin Assay, UFH    Collection Time: 11/02/24  1:51 AM   Result Value Ref Range    Heparin Unfractionated 1.0 See Comment Below for Therapeutic Ranges IU/mL   CBC    Collection Time: " 11/02/24  5:55 AM   Result Value Ref Range    WBC 10.1 4.4 - 11.3 x10*3/uL    nRBC 1.0 (H) 0.0 - 0.0 /100 WBCs    RBC 3.33 (L) 4.00 - 5.20 x10*6/uL    Hemoglobin 10.6 (L) 12.0 - 16.0 g/dL    Hematocrit 35.7 (L) 36.0 - 46.0 %     (H) 80 - 100 fL    MCH 31.8 26.0 - 34.0 pg    MCHC 29.7 (L) 32.0 - 36.0 g/dL    RDW 18.0 (H) 11.5 - 14.5 %    Platelets 116 (L) 150 - 450 x10*3/uL   Renal Function Panel    Collection Time: 11/02/24  5:55 AM   Result Value Ref Range    Glucose 114 (H) 74 - 99 mg/dL    Sodium 140 136 - 145 mmol/L    Potassium 5.0 3.5 - 5.3 mmol/L    Chloride 93 (L) 98 - 107 mmol/L    Bicarbonate 24 21 - 32 mmol/L    Anion Gap 28 (H) 10 - 20 mmol/L    Urea Nitrogen 62 (H) 6 - 23 mg/dL    Creatinine 6.03 (H) 0.50 - 1.05 mg/dL    eGFR 7 (L) >60 mL/min/1.73m*2    Calcium 8.0 (L) 8.6 - 10.6 mg/dL    Phosphorus 4.7 2.5 - 4.9 mg/dL    Albumin 3.4 3.4 - 5.0 g/dL   Magnesium    Collection Time: 11/02/24  5:55 AM   Result Value Ref Range    Magnesium 2.35 1.60 - 2.40 mg/dL   Heparin Assay, UFH    Collection Time: 11/02/24  5:55 AM   Result Value Ref Range    Heparin Unfractionated 0.6 See Comment Below for Therapeutic Ranges IU/mL     Scheduled medications  amiodarone, 200 mg, oral, Daily  [Held by provider] apixaban, 5 mg, oral, BID  aspirin, 81 mg, oral, Daily  atorvastatin, 40 mg, oral, q AM  calcium acetate, 1,334 mg, oral, TID  clopidogrel, 75 mg, oral, Daily  fluticasone furoate-vilanteroL, 1 puff, inhalation, Daily  Glucommander - insulin glargine, 1-125 Units, subcutaneous, Daily   And  Glucommander - insulin lispro, 0-125 Units, subcutaneous, With meals & nightly  midodrine, 5 mg, oral, TID  oxygen, , inhalation, Continuous - Inhalation  pantoprazole, 40 mg, oral, Daily before breakfast  predniSONE, 10 mg, oral, Daily  sennosides-docusate sodium, 1 tablet, oral, Nightly  vitamin B complex-vitamin C-folic acid, 1 capsule, oral, Daily      Continuous medications  heparin, 0-4,500 Units/hr, Last Rate: 600  Units/hr (11/02/24 1905)      PRN medications  PRN medications: acetaminophen, dextrose, glucagon, glucagon, glucagon HCL, Glucommander - insulin glargine **AND** Glucommander - insulin lispro **AND** Glucommander - insulin lispro **AND** POCT Glucose, guaiFENesin, heparin, melatonin, ondansetron, polyethylene glycol

## 2024-11-02 NOTE — ASSESSMENT & PLAN NOTE
Faviola Pleitez is a 69 y.o. female with Hx of ESRD-HD, CAD with previous stent, lymphedema, anemia of chronic disease,  chronic respiratory failure (on 3LNC at home), MO, DM, HLP, HTN, recently diagnosed cardiomyopathy presenting from [facility] for evaluation of severe aortic valve stenosis. Also, diagnosed with rhinovirus/bronchitis and new onset afib. Underwent LHC on 11/1 as part of EBONY work-up which revealed 2VD and underwent PCI.      11/1  Severe 80-90% Lmain-prox LAD stenosis s/p DCB and DIANA x 1. 2 layers of DIANA in prox LAD which were under-expanded s/p POBA. Severe mid LAD stenosis 90% distal to prox LAD stent s/p DIANA x1     Severe 95% prox LCX disease - recommend medical management    R femoral access with 6F sheath s/p perclose     Continue ASA 81mg daily and plavix 75mg daily for at least 6 months and aspirin alone for life thereafter.

## 2024-11-03 VITALS
DIASTOLIC BLOOD PRESSURE: 59 MMHG | TEMPERATURE: 98.6 F | SYSTOLIC BLOOD PRESSURE: 103 MMHG | HEART RATE: 70 BPM | RESPIRATION RATE: 20 BRPM | OXYGEN SATURATION: 94 % | BODY MASS INDEX: 46.03 KG/M2 | WEIGHT: 269.62 LBS | HEIGHT: 64 IN

## 2024-11-03 LAB
ABO GROUP (TYPE) IN BLOOD: NORMAL
ALBUMIN SERPL BCP-MCNC: 3.6 G/DL (ref 3.4–5)
ANION GAP SERPL CALC-SCNC: 16 MMOL/L (ref 10–20)
ANTIBODY SCREEN: NORMAL
BUN SERPL-MCNC: 38 MG/DL (ref 6–23)
CALCIUM SERPL-MCNC: 8.5 MG/DL (ref 8.6–10.6)
CHLORIDE SERPL-SCNC: 96 MMOL/L (ref 98–107)
CO2 SERPL-SCNC: 32 MMOL/L (ref 21–32)
CREAT SERPL-MCNC: 4.66 MG/DL (ref 0.5–1.05)
EGFRCR SERPLBLD CKD-EPI 2021: 10 ML/MIN/1.73M*2
ERYTHROCYTE [DISTWIDTH] IN BLOOD BY AUTOMATED COUNT: 18.3 % (ref 11.5–14.5)
GLUCOSE BLD MANUAL STRIP-MCNC: 141 MG/DL (ref 74–99)
GLUCOSE BLD MANUAL STRIP-MCNC: 165 MG/DL (ref 74–99)
GLUCOSE BLD MANUAL STRIP-MCNC: 193 MG/DL (ref 74–99)
GLUCOSE BLD MANUAL STRIP-MCNC: 82 MG/DL (ref 74–99)
GLUCOSE SERPL-MCNC: 140 MG/DL (ref 74–99)
HCT VFR BLD AUTO: 36.3 % (ref 36–46)
HGB BLD-MCNC: 10.8 G/DL (ref 12–16)
MAGNESIUM SERPL-MCNC: 2.22 MG/DL (ref 1.6–2.4)
MCH RBC QN AUTO: 31.7 PG (ref 26–34)
MCHC RBC AUTO-ENTMCNC: 29.8 G/DL (ref 32–36)
MCV RBC AUTO: 107 FL (ref 80–100)
NRBC BLD-RTO: 1.1 /100 WBCS (ref 0–0)
PHOSPHATE SERPL-MCNC: 3.4 MG/DL (ref 2.5–4.9)
PLATELET # BLD AUTO: 108 X10*3/UL (ref 150–450)
POTASSIUM SERPL-SCNC: 5.1 MMOL/L (ref 3.5–5.3)
RBC # BLD AUTO: 3.41 X10*6/UL (ref 4–5.2)
RH FACTOR (ANTIGEN D): NORMAL
SODIUM SERPL-SCNC: 139 MMOL/L (ref 136–145)
UFH PPP CHRO-ACNC: 0.3 IU/ML
WBC # BLD AUTO: 10.6 X10*3/UL (ref 4.4–11.3)

## 2024-11-03 PROCEDURE — 2500000001 HC RX 250 WO HCPCS SELF ADMINISTERED DRUGS (ALT 637 FOR MEDICARE OP)

## 2024-11-03 PROCEDURE — 36415 COLL VENOUS BLD VENIPUNCTURE: CPT

## 2024-11-03 PROCEDURE — 86038 ANTINUCLEAR ANTIBODIES: CPT

## 2024-11-03 PROCEDURE — 1200000002 HC GENERAL ROOM WITH TELEMETRY DAILY

## 2024-11-03 PROCEDURE — 85520 HEPARIN ASSAY: CPT | Performed by: NURSE PRACTITIONER

## 2024-11-03 PROCEDURE — 82947 ASSAY GLUCOSE BLOOD QUANT: CPT

## 2024-11-03 PROCEDURE — 2500000004 HC RX 250 GENERAL PHARMACY W/ HCPCS (ALT 636 FOR OP/ED): Performed by: NURSE PRACTITIONER

## 2024-11-03 PROCEDURE — 2500000005 HC RX 250 GENERAL PHARMACY W/O HCPCS

## 2024-11-03 PROCEDURE — 2500000002 HC RX 250 W HCPCS SELF ADMINISTERED DRUGS (ALT 637 FOR MEDICARE OP, ALT 636 FOR OP/ED)

## 2024-11-03 PROCEDURE — 2500000002 HC RX 250 W HCPCS SELF ADMINISTERED DRUGS (ALT 637 FOR MEDICARE OP, ALT 636 FOR OP/ED): Performed by: NURSE PRACTITIONER

## 2024-11-03 PROCEDURE — 2500000001 HC RX 250 WO HCPCS SELF ADMINISTERED DRUGS (ALT 637 FOR MEDICARE OP): Performed by: NURSE PRACTITIONER

## 2024-11-03 PROCEDURE — 85027 COMPLETE CBC AUTOMATED: CPT

## 2024-11-03 PROCEDURE — 83735 ASSAY OF MAGNESIUM: CPT

## 2024-11-03 PROCEDURE — 80069 RENAL FUNCTION PANEL: CPT

## 2024-11-03 PROCEDURE — 2500000004 HC RX 250 GENERAL PHARMACY W/ HCPCS (ALT 636 FOR OP/ED)

## 2024-11-03 PROCEDURE — 86901 BLOOD TYPING SEROLOGIC RH(D): CPT

## 2024-11-03 PROCEDURE — 99232 SBSQ HOSP IP/OBS MODERATE 35: CPT | Performed by: INTERNAL MEDICINE

## 2024-11-03 ASSESSMENT — COGNITIVE AND FUNCTIONAL STATUS - GENERAL
PERSONAL GROOMING: A LITTLE
WALKING IN HOSPITAL ROOM: A LOT
MOVING FROM LYING ON BACK TO SITTING ON SIDE OF FLAT BED WITH BEDRAILS: A LITTLE
MOVING TO AND FROM BED TO CHAIR: A LITTLE
CLIMB 3 TO 5 STEPS WITH RAILING: A LOT
STANDING UP FROM CHAIR USING ARMS: A LITTLE
MOBILITY SCORE: 16
TOILETING: A LITTLE
TURNING FROM BACK TO SIDE WHILE IN FLAT BAD: A LITTLE
DAILY ACTIVITIY SCORE: 20
DRESSING REGULAR UPPER BODY CLOTHING: A LITTLE
HELP NEEDED FOR BATHING: A LITTLE

## 2024-11-03 ASSESSMENT — PAIN SCALES - GENERAL
PAINLEVEL_OUTOF10: 0 - NO PAIN
PAINLEVEL_OUTOF10: 3

## 2024-11-03 ASSESSMENT — PAIN - FUNCTIONAL ASSESSMENT: PAIN_FUNCTIONAL_ASSESSMENT: 0-10

## 2024-11-03 NOTE — CARE PLAN
Problem: Respiratory  Goal: Minimal/no exertional discomfort or dyspnea this shift  11/3/2024 1728 by Maria Isabel Alonso RN  Outcome: Progressing  11/3/2024 1728 by Maria Isabel Alonso RN  Outcome: Progressing     Problem: Fall/Injury  Goal: Not fall by end of shift  11/3/2024 1728 by Maria Isabel Alonso RN  Outcome: Progressing  11/3/2024 1728 by Maria Isabel Alonso RN  Outcome: Progressing     Problem: Pain - Adult  Goal: Verbalizes/displays adequate comfort level or baseline comfort level  11/3/2024 1728 by Maria Isabel Alonso RN  Outcome: Progressing  11/3/2024 1728 by Maria Isabel Alonso RN  Outcome: Progressing     Problem: Safety - Adult  Goal: Free from fall injury  Outcome: Progressing     Problem: Skin  Goal: Participates in plan/prevention/treatment measures  Outcome: Progressing     Patient remained stable. Therapeutic on heparin drip. No c/o chest pain or SOB. Plan is for HD tomorrow and TAVR on Tuesday.

## 2024-11-04 PROBLEM — Z95.2 S/P TAVR (TRANSCATHETER AORTIC VALVE REPLACEMENT): Status: ACTIVE | Noted: 2024-01-01

## 2024-11-04 LAB
ALBUMIN SERPL BCP-MCNC: 3.5 G/DL (ref 3.4–5)
ANION GAP SERPL CALC-SCNC: 17 MMOL/L (ref 10–20)
BUN SERPL-MCNC: 55 MG/DL (ref 6–23)
CALCIUM SERPL-MCNC: 8.2 MG/DL (ref 8.6–10.6)
CHLORIDE SERPL-SCNC: 96 MMOL/L (ref 98–107)
CO2 SERPL-SCNC: 30 MMOL/L (ref 21–32)
CREAT SERPL-MCNC: 5.51 MG/DL (ref 0.5–1.05)
EGFRCR SERPLBLD CKD-EPI 2021: 8 ML/MIN/1.73M*2
ERYTHROCYTE [DISTWIDTH] IN BLOOD BY AUTOMATED COUNT: 18.1 % (ref 11.5–14.5)
GLUCOSE BLD MANUAL STRIP-MCNC: 115 MG/DL (ref 74–99)
GLUCOSE BLD MANUAL STRIP-MCNC: 167 MG/DL (ref 74–99)
GLUCOSE BLD MANUAL STRIP-MCNC: 85 MG/DL (ref 74–99)
GLUCOSE SERPL-MCNC: 108 MG/DL (ref 74–99)
HCT VFR BLD AUTO: 34 % (ref 36–46)
HGB BLD-MCNC: 10.7 G/DL (ref 12–16)
MAGNESIUM SERPL-MCNC: 2.25 MG/DL (ref 1.6–2.4)
MCH RBC QN AUTO: 33.1 PG (ref 26–34)
MCHC RBC AUTO-ENTMCNC: 31.5 G/DL (ref 32–36)
MCV RBC AUTO: 105 FL (ref 80–100)
NRBC BLD-RTO: 0.5 /100 WBCS (ref 0–0)
PHOSPHATE SERPL-MCNC: 3.3 MG/DL (ref 2.5–4.9)
PLATELET # BLD AUTO: 100 X10*3/UL (ref 150–450)
POTASSIUM SERPL-SCNC: 5.3 MMOL/L (ref 3.5–5.3)
RBC # BLD AUTO: 3.23 X10*6/UL (ref 4–5.2)
SODIUM SERPL-SCNC: 138 MMOL/L (ref 136–145)
UFH PPP CHRO-ACNC: 0.2 IU/ML
UFH PPP CHRO-ACNC: 0.3 IU/ML
UFH PPP CHRO-ACNC: 0.5 IU/ML
WBC # BLD AUTO: 10 X10*3/UL (ref 4.4–11.3)

## 2024-11-04 PROCEDURE — 84466 ASSAY OF TRANSFERRIN: CPT

## 2024-11-04 PROCEDURE — 2500000002 HC RX 250 W HCPCS SELF ADMINISTERED DRUGS (ALT 637 FOR MEDICARE OP, ALT 636 FOR OP/ED): Performed by: NURSE PRACTITIONER

## 2024-11-04 PROCEDURE — 2500000001 HC RX 250 WO HCPCS SELF ADMINISTERED DRUGS (ALT 637 FOR MEDICARE OP)

## 2024-11-04 PROCEDURE — 36415 COLL VENOUS BLD VENIPUNCTURE: CPT | Performed by: NURSE PRACTITIONER

## 2024-11-04 PROCEDURE — 2500000005 HC RX 250 GENERAL PHARMACY W/O HCPCS

## 2024-11-04 PROCEDURE — 90935 HEMODIALYSIS ONE EVALUATION: CPT | Performed by: INTERNAL MEDICINE

## 2024-11-04 PROCEDURE — 82607 VITAMIN B-12: CPT

## 2024-11-04 PROCEDURE — 8010000001 HC DIALYSIS - HEMODIALYSIS PER DAY

## 2024-11-04 PROCEDURE — 80069 RENAL FUNCTION PANEL: CPT

## 2024-11-04 PROCEDURE — 2500000002 HC RX 250 W HCPCS SELF ADMINISTERED DRUGS (ALT 637 FOR MEDICARE OP, ALT 636 FOR OP/ED)

## 2024-11-04 PROCEDURE — 85027 COMPLETE CBC AUTOMATED: CPT | Performed by: NURSE PRACTITIONER

## 2024-11-04 PROCEDURE — 2500000001 HC RX 250 WO HCPCS SELF ADMINISTERED DRUGS (ALT 637 FOR MEDICARE OP): Performed by: NURSE PRACTITIONER

## 2024-11-04 PROCEDURE — 1200000002 HC GENERAL ROOM WITH TELEMETRY DAILY

## 2024-11-04 PROCEDURE — 2500000004 HC RX 250 GENERAL PHARMACY W/ HCPCS (ALT 636 FOR OP/ED): Performed by: NURSE PRACTITIONER

## 2024-11-04 PROCEDURE — 83735 ASSAY OF MAGNESIUM: CPT

## 2024-11-04 PROCEDURE — 85520 HEPARIN ASSAY: CPT | Performed by: NURSE PRACTITIONER

## 2024-11-04 PROCEDURE — 82947 ASSAY GLUCOSE BLOOD QUANT: CPT

## 2024-11-04 PROCEDURE — 99233 SBSQ HOSP IP/OBS HIGH 50: CPT | Performed by: INTERNAL MEDICINE

## 2024-11-04 PROCEDURE — 99231 SBSQ HOSP IP/OBS SF/LOW 25: CPT | Performed by: NURSE PRACTITIONER

## 2024-11-04 PROCEDURE — 82746 ASSAY OF FOLIC ACID SERUM: CPT

## 2024-11-04 RX ORDER — PANTOPRAZOLE SODIUM 40 MG/1
40 TABLET, DELAYED RELEASE ORAL
Status: DISCONTINUED | OUTPATIENT
Start: 2024-11-04 | End: 2024-11-05

## 2024-11-04 RX ORDER — BISACODYL 5 MG
5 TABLET, DELAYED RELEASE (ENTERIC COATED) ORAL DAILY PRN
Status: DISCONTINUED | OUTPATIENT
Start: 2024-11-04 | End: 2024-11-10 | Stop reason: HOSPADM

## 2024-11-04 ASSESSMENT — COGNITIVE AND FUNCTIONAL STATUS - GENERAL
WALKING IN HOSPITAL ROOM: A LOT
MOBILITY SCORE: 16
MOVING FROM LYING ON BACK TO SITTING ON SIDE OF FLAT BED WITH BEDRAILS: A LITTLE
TOILETING: A LITTLE
TURNING FROM BACK TO SIDE WHILE IN FLAT BAD: A LITTLE
CLIMB 3 TO 5 STEPS WITH RAILING: A LOT
MOVING TO AND FROM BED TO CHAIR: A LITTLE
DRESSING REGULAR UPPER BODY CLOTHING: A LITTLE
STANDING UP FROM CHAIR USING ARMS: A LITTLE
DAILY ACTIVITIY SCORE: 20
PERSONAL GROOMING: A LITTLE
HELP NEEDED FOR BATHING: A LITTLE

## 2024-11-04 ASSESSMENT — PAIN - FUNCTIONAL ASSESSMENT
PAIN_FUNCTIONAL_ASSESSMENT: 0-10
PAIN_FUNCTIONAL_ASSESSMENT: 0-10
PAIN_FUNCTIONAL_ASSESSMENT: NO/DENIES PAIN

## 2024-11-04 ASSESSMENT — PAIN SCALES - GENERAL
PAINLEVEL_OUTOF10: 3
PAINLEVEL_OUTOF10: 8

## 2024-11-04 ASSESSMENT — PAIN DESCRIPTION - LOCATION: LOCATION: HEAD

## 2024-11-04 NOTE — PROGRESS NOTES
Subjective Data:  Patient sitting up in chair, returned from HD. Denies any LH or dizziness but reported objective hypotension during HD which resolved by the time patient was on the floor. Denies SOB or CP.     Overnight Events:    None     Objective Data:  Last Recorded Vitals:  Vitals:    11/04/24 0734 11/04/24 1140 11/04/24 1205 11/04/24 1253   BP:    100/55   BP Location:    Right arm   Patient Position:    Sitting   Pulse: 57 79 66 84   Resp:    20   Temp: 35 °C (95 °F)   36.3 °C (97.3 °F)   TempSrc:    Temporal   SpO2:    97%   Weight:       Height:           Last Labs:  CBC - 11/4/2024:  4:33 AM  10.0 10.7 100    34.0      CMP - 11/4/2024:  4:33 AM  8.2 6.2 22 --- 0.5   3.3 3.5 14 94      PTT - 11/2/2024: 12:01 AM  1.6   18.6 124     BNP   Date/Time Value Ref Range Status   10/28/2024 09:50 AM 1,284 0 - 99 pg/mL Final     HGBA1C   Date/Time Value Ref Range Status   08/07/2024 03:16 AM 7.0 4.0 - 5.6 % Final   01/16/2024 04:33 PM 7.4 4.0 - 5.6 % Final      Last I/O:  I/O last 3 completed shifts:  In: 1212 (9.9 mL/kg) [P.O.:1140; I.V.:72 (0.6 mL/kg)]  Out: 200 (1.6 mL/kg) [Urine:200 (0 mL/kg/hr)]  Weight: 122.4 kg     Past Cardiology Tests (Last 3 Years):  EKG:  ECG 12 Lead 10/31/2024      ECG 12 lead 10/28/2024    Echo:  Transthoracic Echo (TTE) Complete 10/29/2024    Ejection Fractions:  EF   Date/Time Value Ref Range Status   10/29/2024 12:00 PM 31 %      Cath:  Cardiac Catheterization Procedure 11/01/2024    Stress Test:  No results found for this or any previous visit from the past 1095 days.    Cardiac Imaging:  No results found for this or any previous visit from the past 1095 days.      Inpatient Medications:  Scheduled medications   Medication Dose Route Frequency    amiodarone  200 mg oral Daily    [Held by provider] apixaban  5 mg oral BID    aspirin  81 mg oral Daily    atorvastatin  40 mg oral q AM    calcium acetate  1,334 mg oral TID    clopidogrel  75 mg oral Daily    fluticasone  furoate-vilanteroL  1 puff inhalation Daily    Glucommander - insulin glargine  1-125 Units subcutaneous Daily    And    Glucommander - insulin lispro  0-125 Units subcutaneous With meals & nightly    midodrine  5 mg oral TID    oxygen   inhalation Continuous - Inhalation    pantoprazole  40 mg oral BID AC    sennosides-docusate sodium  1 tablet oral Nightly    vitamin B complex-vitamin C-folic acid  1 capsule oral Daily     PRN medications   Medication    acetaminophen    bisacodyl    dextrose    glucagon    glucagon    glucagon HCL    Glucommander - insulin lispro    guaiFENesin    heparin    melatonin    ondansetron    polyethylene glycol     Continuous Medications   Medication Dose Last Rate    heparin  0-4,500 Units/hr 800 Units/hr (11/04/24 0514)       Physical Exam:  General: NAD, sitting in chair  Skin: warm and dry   Head/ neck: no JVD seen at 90 degrees  Cardiac: RRR, S1, S2   Pulm: CTAB, O2 via nc   GI: soft, nontender   Extremities: no LE edema, chronic bilateral lymphedema; right fem groin site with minimal ecchymosis, nontender, BLE warm; LUE AVF  Neuro: no focal neuro deficits   Psych: appropriate mood and behavior       Assessment/Plan   Faviola Pleitez is a 69 y.o. female with Hx of ESRD-HD, CAD with previous stent, lymphedema, anemia of chronic disease,  chronic respiratory failure (on 3LNC at home), MO, DM, HLP, HTN, recently diagnosed cardiomyopathy presenting from [facility] for evaluation of severe aortic valve stenosis. Also, diagnosed with rhinovirus/bronchitis and new onset afib. Structural consulted and requested R/LHC prior to inpatient TAVR.     11/1  Severe 80-90% Lmain-prox LAD stenosis s/p DCB and DIANA x 1. 2 layers of DIANA in prox LAD which were under-expanded s/p POBA. Severe mid LAD stenosis 90% distal to prox LAD stent s/p DIANA x1  Severe 95% prox LCX disease - recommend medical management      Description of the Procedure:   R femoral access with 6F sheath s/p perclose. Bed rest  for 2h  Continue ASA 81mg and plavix 75mg daily. Loaded with plavix 600mg once in lab     Interventional Recs:   - cont telemetry care per PCI protocol  - OK to discharge from interventional perspective   - cont DAPT with aspirin and plavix  - triple therapy plan with AC (for Afib) and plavix to be determined by structural heart team s/p TAVR  - provided education on compliance with DAPT  - at discharge, PLEASE send 90 day prescription of plavix to [facility] (for price check and Meds to Beds) and remaining refills to patient's home pharmacy   - continue high intensity statin  - not on BB 2/2 COPD  - please ensure cardiology follow up appointment after discharge in 1-3 weeks  - patient referred to cardiac rehab   - no lifting anything heavier than 10 lbs for one week for groin access       Code Status:  Full Code    I spent 30 minutes in the professional and overall care of this patient.    Interventional Cardiology will sign off.     Gisela Gutierrez CNP  Interventional Cardiology     CICU Fellow Pager: [phone] anytime  Endovascular /Limb Salvage Team Pager: [phone] for day coverage (8am-5pm)  Interventional Cardiology Fellow Pager: [phone] (7AM-5PM) Night coverage: [facility] Cross Cover [id number]  Night coverage: [facility] [phone]     SHARONDA Estrada-COLLIN

## 2024-11-04 NOTE — PROGRESS NOTES
Faviola Pleitez is a 69 y.o. female on day 7 of admission presenting with Severe aortic stenosis.      Subjective   Seen on HD. Asymptomatic despite low BP, no complaints       Objective   Vitals 24HR  Heart Rate:  [57-79]   Temp:  [35 °C (95 °F)-37 °C (98.6 °F)]   Resp:  [20]   BP: ()/(59-75)   Weight:  [122 kg (269 lb 13.5 oz)]   SpO2:  [94 %-98 %]     Intake/Output last 3 Shifts:    Intake/Output Summary (Last 24 hours) at 11/4/2024 1227  Last data filed at 11/4/2024 1140  Gross per 24 hour   Intake 1134 ml   Output 2500 ml   Net -1366 ml       Physical Exam  No distress  HEENT:  moist, no pallor  3+ edema calvin LE  Breath sounds calvin equal, clear  S1 S2 regular, normal, no rub or murmur  Abdomen soft  AAO x3, non focal    amiodarone, 200 mg, oral, Daily  [Held by provider] apixaban, 5 mg, oral, BID  aspirin, 81 mg, oral, Daily  atorvastatin, 40 mg, oral, q AM  calcium acetate, 1,334 mg, oral, TID  clopidogrel, 75 mg, oral, Daily  fluticasone furoate-vilanteroL, 1 puff, inhalation, Daily  Glucommander - insulin glargine, 1-125 Units, subcutaneous, Daily   And  Glucommander - insulin lispro, 0-125 Units, subcutaneous, With meals & nightly  midodrine, 5 mg, oral, TID  oxygen, , inhalation, Continuous - Inhalation  pantoprazole, 40 mg, oral, Daily before breakfast  sennosides-docusate sodium, 1 tablet, oral, Nightly  vitamin B complex-vitamin C-folic acid, 1 capsule, oral, Daily      Assessment/Plan      Year old with h/o ESRD on HD admitted with severe aortic stenosis, planned for TAVR tomorrow  Nephrology following for ESRD    #ESRD: Tolerating HD per submitted orders, 2 K, 2.5 Ca, UF goal 2 L    # Anemia: HgB   Hemoglobin   Date Value Ref Range Status   11/04/2024 10.7 (L) 12.0 - 16.0 g/dL Final    continue to hold JOHAN    # MBD: Ca   Calcium   Date Value Ref Range Status   11/04/2024 8.2 (L) 8.6 - 10.6 mg/dL Final     Phosphorus   Date Value Ref Range Status   11/04/2024 3.3 2.5 - 4.9 mg/dL Final     Comment:      MILD HEMOLYSIS DETECTED. The result may be falsely elevated due to hemolysis or other interferents. Clinical correlation is recommended. Repeat testing may be considered.  The performance characteristics of phosphorus testing in heparinized plasma have been validated by the individual  laboratory site where testing is performed. Testing on heparinized plasma is not approved by the FDA; however, such approval is not necessary.    Ct Ca acetate 3 tab PO TIDWM and daily MVI    #Hypertension: BP  low, on Midodrine    #Volume status: hypervolemic, continue UF as tolerated    Will continue to follow

## 2024-11-04 NOTE — TREATMENT PLAN
Structural Heart Plan of Care Note     We are following Ms. Pleitez for critical symptomatic AV stenosis        TAVR work-up checklist - COMPLETED  - Dental - Cleared by [facility]  - ECHO - completed, critical AS  - LHC - completed today, pending signed report  - EBONY CTA (C/A/P) with IV con - complete, feasible for transfemoral access  - Cardiac Surgery consult - Completed     Type and screen sent 11/3/24 (no antibodies)  Vitals and Labs reviewed - no red flags for procedure tomorrow      Recs/Plan:  - Inpatient TAVR 11/5/24 at 1230 with Dr. Hart and Dr. Trinidad/Dr. Robin  - Needs CBC, BMP, INR at 0400 on 11/5/24  - NPO at 0000 on 11/5/24  - Hold all medications in the AM of 11/5/24, but ok to give ASA, Plavix, and Midodrine  - Stop Heparin gtt at 0600 on 11/5/24     We will continue to follow, please contact us with any questions        Inderjit Smith MSN, AGASaint [facility]-BC  Acute Care Nurse Practitioner  Structural Heart / TAVR Team  Structural Pager: [phone]  (Nights) [facility] fellow pager: [phone]

## 2024-11-04 NOTE — NURSING NOTE
Report from Sending RN:    Report From: therese  Recent Surgery of Procedure: Yes heart cath  Baseline Level of Consciousness (LOC): a/o x 3  Oxygen Use: Yes  Type: 2L NC  Diabetic: Yes  Last BP Med Given Day of Dialysis: midodrine  Last Pain Med Given: none  Lab Tests to be Obtained with Dialysis: No  Blood Transfusion to be Given During Dialysis: No  Available IV Access: Yes  Medications to be Administered During Dialysis: No  Continuous IV Infusion Running: Yes- hep gtt @ 8 ml/hr  Restraints on Currently or in Last 24 Hours: No  Hand-Off Communication: full code on precautions for rhinovirus, can come to dialysis for treatment  Dialysis Catheter Dressing: avg  Last Dressing Change: n/a

## 2024-11-04 NOTE — PROGRESS NOTES
Interval events:    No acute events overnight    Subjective:  Patient reports bringing up undigested food, however not feeling nauseated or GI upset. She denies difficulties swallowing.  Patient also states that over the weekend her cough was improving, notes that now starting this AM her cough has come back, productive with green sputum.     Today in brief:  - plan for TAVR tomorrow with Dr. Hart at 12:30-- per SH, okay to given AM ASA, plavix and midodrine, hold all other AM meds. Heparin gtt to hold at 0600   - HD completed this morning, 2.1L removed  - will increase pantoprazole to 40mg BID    Objective:  Vitals:    11/04/24 1253   BP: 100/55   Pulse: 84   Resp: 20   Temp: 36.3 °C (97.3 °F)   SpO2: 97%     Weight         10/30/2024  0447 11/1/2024  0437 11/2/2024  0628 11/3/2024  0422 11/4/2024  0551    Weight: 120 kg (264 lb 1.8 oz) 121 kg (266 lb 5.1 oz) 122 kg (269 lb 2.9 oz) 122 kg (269 lb 10 oz) 122 kg (269 lb 13.5 oz)            Intake/Output Summary (Last 24 hours) at 11/4/2024 1557  Last data filed at 11/4/2024 1140  Gross per 24 hour   Intake 1134 ml   Output 2500 ml   Net -1366 ml     Recent Results (from the past 24 hours)   POCT GLUCOSE    Collection Time: 11/03/24  5:33 PM   Result Value Ref Range    POCT Glucose 193 (H) 74 - 99 mg/dL   POCT GLUCOSE    Collection Time: 11/03/24  9:15 PM   Result Value Ref Range    POCT Glucose 82 74 - 99 mg/dL   CBC    Collection Time: 11/04/24  4:33 AM   Result Value Ref Range    WBC 10.0 4.4 - 11.3 x10*3/uL    nRBC 0.5 (H) 0.0 - 0.0 /100 WBCs    RBC 3.23 (L) 4.00 - 5.20 x10*6/uL    Hemoglobin 10.7 (L) 12.0 - 16.0 g/dL    Hematocrit 34.0 (L) 36.0 - 46.0 %     (H) 80 - 100 fL    MCH 33.1 26.0 - 34.0 pg    MCHC 31.5 (L) 32.0 - 36.0 g/dL    RDW 18.1 (H) 11.5 - 14.5 %    Platelets 100 (L) 150 - 450 x10*3/uL   Renal Function Panel    Collection Time: 11/04/24  4:33 AM   Result Value Ref Range    Glucose 108 (H) 74 - 99 mg/dL    Sodium 138 136 - 145 mmol/L     Potassium 5.3 3.5 - 5.3 mmol/L    Chloride 96 (L) 98 - 107 mmol/L    Bicarbonate 30 21 - 32 mmol/L    Anion Gap 17 10 - 20 mmol/L    Urea Nitrogen 55 (H) 6 - 23 mg/dL    Creatinine 5.51 (H) 0.50 - 1.05 mg/dL    eGFR 8 (L) >60 mL/min/1.73m*2    Calcium 8.2 (L) 8.6 - 10.6 mg/dL    Phosphorus 3.3 2.5 - 4.9 mg/dL    Albumin 3.5 3.4 - 5.0 g/dL   Magnesium    Collection Time: 11/04/24  4:33 AM   Result Value Ref Range    Magnesium 2.25 1.60 - 2.40 mg/dL   Heparin Assay, UFH    Collection Time: 11/04/24  4:33 AM   Result Value Ref Range    Heparin Unfractionated 0.2 See Comment Below for Therapeutic Ranges IU/mL   Heparin Assay, UFH    Collection Time: 11/04/24  9:29 AM   Result Value Ref Range    Heparin Unfractionated 0.5 See Comment Below for Therapeutic Ranges IU/mL   POCT GLUCOSE    Collection Time: 11/04/24 12:37 PM   Result Value Ref Range    POCT Glucose 85 74 - 99 mg/dL     Inpatient Medications:  Scheduled medications   Medication Dose Route Frequency    amiodarone  200 mg oral Daily    [Held by provider] apixaban  5 mg oral BID    aspirin  81 mg oral Daily    atorvastatin  40 mg oral q AM    calcium acetate  1,334 mg oral TID    clopidogrel  75 mg oral Daily    fluticasone furoate-vilanteroL  1 puff inhalation Daily    Glucommander - insulin glargine  1-125 Units subcutaneous Daily    And    Glucommander - insulin lispro  0-125 Units subcutaneous With meals & nightly    midodrine  5 mg oral TID    oxygen   inhalation Continuous - Inhalation    pantoprazole  40 mg oral BID AC    sennosides-docusate sodium  1 tablet oral Nightly    vitamin B complex-vitamin C-folic acid  1 capsule oral Daily     PRN medications   Medication    acetaminophen    bisacodyl    dextrose    glucagon    glucagon    glucagon HCL    Glucommander - insulin lispro    guaiFENesin    heparin    melatonin    ondansetron    polyethylene glycol     Continuous Medications   Medication Dose Last Rate    heparin  0-4,500 Units/hr 800 Units/hr  (11/04/24 0514)     Procedures/testing:  TRANSTHORACIC ECHO (TTE) COMPLETE 10/29/2024   1. Left ventricular ejection fraction is moderately decreased, calculated by Daniels's biplane at 31%.   2. There is global hypokinesis of the left ventricle with minor regional variations.   3. Left ventricular diastolic filling was indeterminate.   4. There is reduced right ventricular systolic function.   5. Moderately enlarged right ventricle.   6. The left atrium is mildly dilated.   7. There is moderate mitral annular calcification.   8. Mild to moderate tricuspid regurgitation visualized.   9. Moderately elevated right ventricular systolic pressure.  10. Severe calcific AS with gradients of 64/38.4mmHg and DI of 0.19 and mild AI. ( Note the gradients and VTI were averaged over 5 consecutive beats given atrial fibrillation ) consistent with severe AS.  11. There is severe aortic valve cusp calcification.  12. Mild aortic valve regurgitation.  13. No prior echocardioram available for comparison.    CT TAVR NO CONTRAST CHEST ABDOMEN PELVIS; 10/28/2024   1. Mild calcified atherosclerotic changes involving the thoracoabdominal aorta and its branches. Please note that, the assessment of vascular patency is not possible in absence of intravenous contrast.  2. Severe calcifications of the aortic valve correlating with history of aortic stenosis.  3. Moderate coronary artery calcifications, with suggestion of underlying stents. Please note,the study is not optimized for evaluation of coronary arteries.  4. Multiple solid non-calcified pulmonary nodules measuring up to 7mm. In absence of prior comparative examinations, to ensure stability, consider follow up non contrast chest CT at 3-6 months, then consider CT chest at 18-24 months.  5. Heterogenous thyroid gland with nodules measuring up to 1.3 cm. Further evaluation with nonemergent thyroid ultrasound is recommended.  6. Cholelithiasis without evidence of acute cholecystitis.  7.  Moderate-sized fat containing periumbilical hernia.  8. Atrophic bilateral kidneys.    Telemetry 11/4/2024 (personally reviewed): Atrial fibrillation 60-80s    Physical exam:  General: NAD  Head/ neck: no JVD at 90 degrees  Cardiac: Irregular, S1 S2 , systolic murmur  Pulm: decreased breath sounds throughout, on 2L O2 NC (baseline)  Vascular: Radial 2+ bilaterally  GI: Non distended. Ecchymosis along lower abdomen  Extremities: +1/2 BLE edema, ACE wraps applied to bilateral lower legs. Right groin cath site JEAN, soft without signs of hematoma. Nontender with palpation  Neuro: no focal neuro deficits   Psych: appropriate mood and behavior, pleasant  Skin: Lukewarm and dry    Assessment/Plan   Faviola Pleitez is a 69 y.o. female with Hx of ESRD-HD, CAD with previous stent, lymphedema, anemia of chronic disease,  chronic respiratory failure (on 3LNC at home), MO, DM, HLP, HTN, recently diagnosed cardiomyopathy presenting from [facility] for evaluation of severe aortic valve stenosis. Also, diagnosed with rhinovirus/bronchitis and new onset afib.     Severe AS  - TTE 10/29/24: EF 31%, GHK, reduced RV systolic function, LA mildly dilated, mod mitral annular calcification, mod elevated RVSP, Severe calcific AS with gradients of 64/38.4mmHg and DI of 0.19 and mild AI. ( Note the gradients and VTI were averaged over 5 consecutive beats given atrial fibrillation ) consistent with severe aortic stenosis, severe AV cusp calc, mild AR  - CT TAVR without contrast 10/29  Mild calcified atherosclerotic changes involving the thoracoabdominal aorta and its branches. Severe calcifications of the aortic valve correlating with history of aortic stenosis. Moderate coronary artery calcifications, with suggestion of  underlying stents.   - CT Surgery felt patient not SAVR candidate. Patient prefers TAVR.   -  following, Inpatient TAVR 11/5/24 at 1230 with Dr. Hart  - Will send CBC + BMP + INR at 0400 on 11/5/24. Stop Heparin gtt  at 0600 on 11/5/24  -- repeat CT TAVR with full contrast completed, pending read (previous was done without contrast, unable to safely plan procedure with non-con imaging)  -- [facility] revealed multifocal dental caries. No periapical lucency to suggest abscess  -- [facility] 11/1: Culprit vessel(s): left anterior descending. OCT guided successful PCI to LM and mid and proximal LAD with double stenting. Successful DCB for in stent restenosis of LAD stents. Lcx 50% ostial stenosis     Newly diagnosed CM, type unknown by echo 9/24/24  Acute systolic and diastolic heart failure, decompensated  - echo at OSH  9/27/24 EF 42%. Left Ventricle: Moderately reduced left ventricular systolic function. Moderate global hypokinesis present. Aortic Valve: Trileaflet valve. Severely calcified right and noncoronary cusps. Mild regurgitation. Severe stenosis of the aortic valve. AV mean gradient is 49 mmHg. AV area by continuity VTI is 0.6 cm2. Mitral Valve: Mild annular calcification at the posterior leaflet.  Increased E-point septal separation suggesting decreased forward cardiac output. Moderate regurgitation. Tricuspid Valve: Mild regurgitation. The estimated RVSP is 58 mmHg. Moderately elevated RVSP, consistent with moderate pulmonary hypertension.   Left atrium is mildly dilated.   - TTE 10/29/24: EF 31%, GHK, reduced RV systolic function, LA mildly dilated, mod mitral annular calcification, mod elevated RVSP, Severe calcific AS with gradients of 64/38.4mmHg and DI of 0.19 and mild AI. ( Note the gradients and VTI were averaged over 5 consecutive beats given atrial fibrillation ) consistent with severe aortic stenosis, severe AV cusp calc, mild AR  - admit CXR: Cardiomegaly and interstitial pulmonary edema. Correlate with volume status. Cannot exclude trace bibasilar pleural effusion.  - BNP 1284  - admit wt: 119 kg  - daily wt: 122 kg  - volume management with HD   - GDMT limited due to ESRD on HD  - Daily standing weights, strict  I&O's    Newly diagnosed afib 10/25/24   - seen by cardiology at OSH, started on amio and eliquis  - afib rate controlled on tele, HR 50-80s  - EKG: atrial fibrillation with frequent PVCs 101 bpm  - 10/28 hold eliquis-- Eliquis is covered and copay is $4.60   - cont heparin gtt  - amiodarone 200mg daily    CAD s/p PCI 11/1/24  - followed with her cardiologist in [facility].  - Patient admitted with CP, found to have AS   - 2015 non-STEMI and underwent successful coronary artery intervention with a DIANA to her LAD but unfortunately had diagonal ostium jailed with >70% stenosis.   - [facility] 11/1: Culprit vessel(s): left anterior descending. OCT guided successful PCI to LM and mid and proximal LAD with double stenting. Successful DCB for in stent restenosis of LAD stents. Lcx 50% ostial stenosis  - historically not on BB due to COPD  - cont plavix 75mg daily, ASA and statin     Chronic respiratory failure   Rhinovirus/Bronchitis  Pulmonary nodules, incidental finding  - CT TAVR: Multiple solid non-calcified pulmonary nodules measuring up to 7 mm. In absence of prior comparative examinations, to ensure stability, consider follow up non contrast chest CT at 3-6 months,  then consider CT chest at 18-24 months.   - Baseline 3L NC at home  - Completed prednisone taper per OSH recs-adjusted inpatient for shorter course   - Continue dulera per OSH recs  - Continue guaifenesin PRN per OSH rec    - RT consult for chest therapy      ESRD on HD T,TH, Sat via left arm fistula  - 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  - nephrology following, HD 10/29, 10/31, 11/2, 11/4  - remains hypervolemic following HD fluid removal     Hyperkalemia  - 10/31 Hyperkalemic overnight, 6.3 with repeat 5.8. Was started on Lokelma + received 10U regular insulin, 25g D50 and D10W infusion. Repeat early this morning 6.3 x2. K improved following HD, 5.3  - K trend: 5.3- mild hemolysis (5.1, 5.0, 4.8, 5.3, 6.3)     Anemia of chronic disease  - Hgb 11.2  on admit      Acute Cellulitis  Hx lymphedema  - continue levaquin for 3 more day  - recently completed metronidazole course  - wound care consulted, wound care recs ordered     DM  - Glucose well controlled inpatient  - at OSH had poorly controlled BS due to prednisone and home lantus and meal time insulin increased  - Will lower lantus and give SS #1 inpatient  - Glucose elevated. Initiated glucommander     DVT ppx: heparin gtt-- holding at 06:00 11/5 for TAVR  DISPO: will stay in house for TAVR scheduled 11/5 with Attizzani  Code status: Full Code    Patient discussed with Dr. Katerin Gutierrez, PA-C

## 2024-11-04 NOTE — NURSING NOTE
Report to Receiving RN:    Report To: JENNIFER Eddy  Time Report Called: 0224  Hand-Off Communication: Pt completed 3 hrs 45 mins, 2.1L fluid removed, BP 93/56, HR 78, no issues, pt stable, A/O.  Complications During Treatment: No  Ultrafiltration Treatment: Yes  Medications Administered During Dialysis: No  Blood Products Administered During Dialysis: No  Labs Sent During Dialysis: Yes  Heparin Drip Rate Changes: No    Last Updated: 11:36 AM by RADHA VALDEZ

## 2024-11-05 LAB
ALBUMIN SERPL BCP-MCNC: 3.2 G/DL (ref 3.4–5)
ALBUMIN SERPL BCP-MCNC: 3.6 G/DL (ref 3.4–5)
ANION GAP BLDA CALCULATED.4IONS-SCNC: 9 MMO/L (ref 10–25)
ANION GAP SERPL CALC-SCNC: 13 MMOL/L (ref 10–20)
ANION GAP SERPL CALC-SCNC: 17 MMOL/L (ref 10–20)
APTT PPP: 28 SECONDS (ref 27–38)
APTT PPP: 47 SECONDS (ref 27–38)
ATRIAL RATE: 39 BPM
BASE EXCESS BLDA CALC-SCNC: 2.6 MMOL/L (ref -2–3)
BASOPHILS # BLD AUTO: 0.01 X10*3/UL (ref 0–0.1)
BASOPHILS NFR BLD AUTO: 0.1 %
BODY TEMPERATURE: 37 DEGREES CELSIUS
BUN SERPL-MCNC: 34 MG/DL (ref 6–23)
BUN SERPL-MCNC: 36 MG/DL (ref 6–23)
CA-I BLDA-SCNC: 1.04 MMOL/L (ref 1.1–1.33)
CALCIUM SERPL-MCNC: 7.7 MG/DL (ref 8.6–10.6)
CALCIUM SERPL-MCNC: 8.4 MG/DL (ref 8.6–10.6)
CHLORIDE BLDA-SCNC: 102 MMOL/L (ref 98–107)
CHLORIDE SERPL-SCNC: 98 MMOL/L (ref 98–107)
CHLORIDE SERPL-SCNC: 98 MMOL/L (ref 98–107)
CHOLEST SERPL-MCNC: 127 MG/DL (ref 0–199)
CHOLESTEROL/HDL RATIO: 2
CO2 SERPL-SCNC: 30 MMOL/L (ref 21–32)
CO2 SERPL-SCNC: 34 MMOL/L (ref 21–32)
CREAT SERPL-MCNC: 4.13 MG/DL (ref 0.5–1.05)
CREAT SERPL-MCNC: 4.5 MG/DL (ref 0.5–1.05)
EGFRCR SERPLBLD CKD-EPI 2021: 10 ML/MIN/1.73M*2
EGFRCR SERPLBLD CKD-EPI 2021: 11 ML/MIN/1.73M*2
EOSINOPHIL # BLD AUTO: 0.12 X10*3/UL (ref 0–0.7)
EOSINOPHIL NFR BLD AUTO: 1.1 %
ERYTHROCYTE [DISTWIDTH] IN BLOOD BY AUTOMATED COUNT: 18.4 % (ref 11.5–14.5)
ERYTHROCYTE [DISTWIDTH] IN BLOOD BY AUTOMATED COUNT: 18.8 % (ref 11.5–14.5)
EST. AVERAGE GLUCOSE BLD GHB EST-MCNC: 163 MG/DL
FERRITIN SERPL-MCNC: 1321 NG/ML (ref 8–150)
FOLATE SERPL-MCNC: >24 NG/ML
GLUCOSE BLD MANUAL STRIP-MCNC: 168 MG/DL (ref 74–99)
GLUCOSE BLD MANUAL STRIP-MCNC: 178 MG/DL (ref 74–99)
GLUCOSE BLD MANUAL STRIP-MCNC: 181 MG/DL (ref 74–99)
GLUCOSE BLD MANUAL STRIP-MCNC: 201 MG/DL (ref 74–99)
GLUCOSE BLD MANUAL STRIP-MCNC: 203 MG/DL (ref 74–99)
GLUCOSE BLDA-MCNC: 222 MG/DL (ref 74–99)
GLUCOSE SERPL-MCNC: 199 MG/DL (ref 74–99)
GLUCOSE SERPL-MCNC: 217 MG/DL (ref 74–99)
HBA1C MFR BLD: 7.3 %
HCO3 BLDA-SCNC: 28.4 MMOL/L (ref 22–26)
HCT VFR BLD AUTO: 30.7 % (ref 36–46)
HCT VFR BLD AUTO: 36 % (ref 36–46)
HCT VFR BLD EST: 34 % (ref 36–46)
HDLC SERPL-MCNC: 62.5 MG/DL
HGB BLD-MCNC: 10.7 G/DL (ref 12–16)
HGB BLD-MCNC: 9.7 G/DL (ref 12–16)
HGB BLDA-MCNC: 11.4 G/DL (ref 12–16)
HIV 1+2 AB+HIV1 P24 AG SERPL QL IA: ABNORMAL
IMM GRANULOCYTES # BLD AUTO: 0.13 X10*3/UL (ref 0–0.7)
IMM GRANULOCYTES NFR BLD AUTO: 1.2 % (ref 0–0.9)
INR PPP: 1 (ref 0.9–1.1)
INR PPP: 1.3 (ref 0.9–1.1)
IRON SATN MFR SERPL: 24 % (ref 25–45)
IRON SERPL-MCNC: 44 UG/DL (ref 35–150)
LACTATE BLDA-SCNC: 1.5 MMOL/L (ref 0.4–2)
LDLC SERPL CALC-MCNC: 45 MG/DL
LYMPHOCYTES # BLD AUTO: 0.79 X10*3/UL (ref 1.2–4.8)
LYMPHOCYTES NFR BLD AUTO: 7.5 %
MAGNESIUM SERPL-MCNC: 2.17 MG/DL (ref 1.6–2.4)
MAGNESIUM SERPL-MCNC: 2.18 MG/DL (ref 1.6–2.4)
MCH RBC QN AUTO: 31.7 PG (ref 26–34)
MCH RBC QN AUTO: 32.3 PG (ref 26–34)
MCHC RBC AUTO-ENTMCNC: 29.7 G/DL (ref 32–36)
MCHC RBC AUTO-ENTMCNC: 31.6 G/DL (ref 32–36)
MCV RBC AUTO: 102 FL (ref 80–100)
MCV RBC AUTO: 107 FL (ref 80–100)
MONOCYTES # BLD AUTO: 0.69 X10*3/UL (ref 0.1–1)
MONOCYTES NFR BLD AUTO: 6.6 %
NEUTROPHILS # BLD AUTO: 8.78 X10*3/UL (ref 1.2–7.7)
NEUTROPHILS NFR BLD AUTO: 83.5 %
NON HDL CHOLESTEROL: 65 MG/DL (ref 0–149)
NRBC BLD-RTO: 0.2 /100 WBCS (ref 0–0)
NRBC BLD-RTO: 0.2 /100 WBCS (ref 0–0)
OXYHGB MFR BLDA: 96.8 % (ref 94–98)
PCO2 BLDA: 48 MM HG (ref 38–42)
PH BLDA: 7.38 PH (ref 7.38–7.42)
PHOSPHATE SERPL-MCNC: 2.4 MG/DL (ref 2.5–4.9)
PHOSPHATE SERPL-MCNC: 3.3 MG/DL (ref 2.5–4.9)
PLATELET # BLD AUTO: 75 X10*3/UL (ref 150–450)
PLATELET # BLD AUTO: 84 X10*3/UL (ref 150–450)
PO2 BLDA: 167 MM HG (ref 85–95)
POTASSIUM BLDA-SCNC: 4.9 MMOL/L (ref 3.5–5.3)
POTASSIUM SERPL-SCNC: 4.5 MMOL/L (ref 3.5–5.3)
POTASSIUM SERPL-SCNC: 4.7 MMOL/L (ref 3.5–5.3)
PROTHROMBIN TIME: 11.2 SECONDS (ref 9.8–12.8)
PROTHROMBIN TIME: 14.5 SECONDS (ref 9.8–12.8)
Q ONSET: 209 MS
QRS COUNT: 9 BEATS
QRS DURATION: 122 MS
QT INTERVAL: 508 MS
QTC CALCULATION(BAZETT): 468 MS
QTC FREDERICIA: 481 MS
R AXIS: -13 DEGREES
RBC # BLD AUTO: 3 X10*6/UL (ref 4–5.2)
RBC # BLD AUTO: 3.38 X10*6/UL (ref 4–5.2)
SAO2 % BLDA: 100 % (ref 94–100)
SODIUM BLDA-SCNC: 134 MMOL/L (ref 136–145)
SODIUM SERPL-SCNC: 140 MMOL/L (ref 136–145)
SODIUM SERPL-SCNC: 140 MMOL/L (ref 136–145)
T AXIS: 144 DEGREES
T OFFSET: 463 MS
TIBC SERPL-MCNC: 181 UG/DL (ref 240–445)
TRANSFERRIN SERPL-MCNC: 147 MG/DL (ref 200–360)
TRIGL SERPL-MCNC: 97 MG/DL (ref 0–149)
TSH SERPL-ACNC: 1.29 MIU/L (ref 0.44–3.98)
UFH PPP CHRO-ACNC: 0.3 IU/ML
UIBC SERPL-MCNC: 137 UG/DL (ref 110–370)
VENTRICULAR RATE: 51 BPM
VIT B12 SERPL-MCNC: 716 PG/ML (ref 211–911)
VLDL: 19 MG/DL (ref 0–40)
WBC # BLD AUTO: 10.5 X10*3/UL (ref 4.4–11.3)
WBC # BLD AUTO: 8.7 X10*3/UL (ref 4.4–11.3)

## 2024-11-05 PROCEDURE — C1769 GUIDE WIRE: HCPCS | Performed by: INTERNAL MEDICINE

## 2024-11-05 PROCEDURE — 2500000002 HC RX 250 W HCPCS SELF ADMINISTERED DRUGS (ALT 637 FOR MEDICARE OP, ALT 636 FOR OP/ED): Performed by: NURSE PRACTITIONER

## 2024-11-05 PROCEDURE — 99291 CRITICAL CARE FIRST HOUR: CPT

## 2024-11-05 PROCEDURE — 37799 UNLISTED PX VASCULAR SURGERY: CPT | Performed by: NURSE PRACTITIONER

## 2024-11-05 PROCEDURE — 80069 RENAL FUNCTION PANEL: CPT

## 2024-11-05 PROCEDURE — 99153 MOD SED SAME PHYS/QHP EA: CPT | Performed by: INTERNAL MEDICINE

## 2024-11-05 PROCEDURE — 85520 HEPARIN ASSAY: CPT | Performed by: NURSE PRACTITIONER

## 2024-11-05 PROCEDURE — 87536 HIV-1 QUANT&REVRSE TRNSCRPJ: CPT

## 2024-11-05 PROCEDURE — 2500000001 HC RX 250 WO HCPCS SELF ADMINISTERED DRUGS (ALT 637 FOR MEDICARE OP)

## 2024-11-05 PROCEDURE — 83540 ASSAY OF IRON: CPT

## 2024-11-05 PROCEDURE — 85025 COMPLETE CBC W/AUTO DIFF WBC: CPT | Performed by: NURSE PRACTITIONER

## 2024-11-05 PROCEDURE — 2020000001 HC ICU ROOM DAILY

## 2024-11-05 PROCEDURE — 84132 ASSAY OF SERUM POTASSIUM: CPT

## 2024-11-05 PROCEDURE — 37799 UNLISTED PX VASCULAR SURGERY: CPT

## 2024-11-05 PROCEDURE — 2500000005 HC RX 250 GENERAL PHARMACY W/O HCPCS

## 2024-11-05 PROCEDURE — 80061 LIPID PANEL: CPT

## 2024-11-05 PROCEDURE — 82565 ASSAY OF CREATININE: CPT

## 2024-11-05 PROCEDURE — 33361 REPLACE AORTIC VALVE PERQ: CPT | Performed by: INTERNAL MEDICINE

## 2024-11-05 PROCEDURE — 36415 COLL VENOUS BLD VENIPUNCTURE: CPT

## 2024-11-05 PROCEDURE — 82947 ASSAY GLUCOSE BLOOD QUANT: CPT

## 2024-11-05 PROCEDURE — 85347 COAGULATION TIME ACTIVATED: CPT

## 2024-11-05 PROCEDURE — 2550000001 HC RX 255 CONTRASTS: Performed by: INTERNAL MEDICINE

## 2024-11-05 PROCEDURE — 82728 ASSAY OF FERRITIN: CPT

## 2024-11-05 PROCEDURE — C1760 CLOSURE DEV, VASC: HCPCS | Performed by: INTERNAL MEDICINE

## 2024-11-05 PROCEDURE — 02RF38Z REPLACEMENT OF AORTIC VALVE WITH ZOOPLASTIC TISSUE, PERCUTANEOUS APPROACH: ICD-10-PCS | Performed by: INTERNAL MEDICINE

## 2024-11-05 PROCEDURE — 85347 COAGULATION TIME ACTIVATED: CPT | Performed by: INTERNAL MEDICINE

## 2024-11-05 PROCEDURE — 85027 COMPLETE CBC AUTOMATED: CPT

## 2024-11-05 PROCEDURE — 85610 PROTHROMBIN TIME: CPT

## 2024-11-05 PROCEDURE — 2500000001 HC RX 250 WO HCPCS SELF ADMINISTERED DRUGS (ALT 637 FOR MEDICARE OP): Performed by: NURSE PRACTITIONER

## 2024-11-05 PROCEDURE — C1894 INTRO/SHEATH, NON-LASER: HCPCS | Performed by: INTERNAL MEDICINE

## 2024-11-05 PROCEDURE — 2500000002 HC RX 250 W HCPCS SELF ADMINISTERED DRUGS (ALT 637 FOR MEDICARE OP, ALT 636 FOR OP/ED)

## 2024-11-05 PROCEDURE — 33361 REPLACE AORTIC VALVE PERQ: CPT | Performed by: THORACIC SURGERY (CARDIOTHORACIC VASCULAR SURGERY)

## 2024-11-05 PROCEDURE — 2500000004 HC RX 250 GENERAL PHARMACY W/ HCPCS (ALT 636 FOR OP/ED): Performed by: INTERNAL MEDICINE

## 2024-11-05 PROCEDURE — 83735 ASSAY OF MAGNESIUM: CPT | Performed by: NURSE PRACTITIONER

## 2024-11-05 PROCEDURE — 93010 ELECTROCARDIOGRAM REPORT: CPT | Performed by: INTERNAL MEDICINE

## 2024-11-05 PROCEDURE — 83036 HEMOGLOBIN GLYCOSYLATED A1C: CPT

## 2024-11-05 PROCEDURE — 2780000003 HC OR 278 NO HCPCS: Performed by: INTERNAL MEDICINE

## 2024-11-05 PROCEDURE — C1889 IMPLANT/INSERT DEVICE, NOC: HCPCS | Performed by: INTERNAL MEDICINE

## 2024-11-05 PROCEDURE — 86703 HIV-1/HIV-2 1 RESULT ANTBDY: CPT

## 2024-11-05 PROCEDURE — C1756 CATH, PACING, TRANSESOPH: HCPCS | Performed by: INTERNAL MEDICINE

## 2024-11-05 PROCEDURE — 2720000007 HC OR 272 NO HCPCS: Performed by: INTERNAL MEDICINE

## 2024-11-05 PROCEDURE — 2500000005 HC RX 250 GENERAL PHARMACY W/O HCPCS: Performed by: INTERNAL MEDICINE

## 2024-11-05 PROCEDURE — 84443 ASSAY THYROID STIM HORMONE: CPT

## 2024-11-05 PROCEDURE — G0269 OCCLUSIVE DEVICE IN VEIN ART: HCPCS | Performed by: INTERNAL MEDICINE

## 2024-11-05 PROCEDURE — 83735 ASSAY OF MAGNESIUM: CPT

## 2024-11-05 PROCEDURE — 99152 MOD SED SAME PHYS/QHP 5/>YRS: CPT | Performed by: INTERNAL MEDICINE

## 2024-11-05 PROCEDURE — C1725 CATH, TRANSLUMIN NON-LASER: HCPCS | Performed by: INTERNAL MEDICINE

## 2024-11-05 PROCEDURE — 71045 X-RAY EXAM CHEST 1 VIEW: CPT | Performed by: RADIOLOGY

## 2024-11-05 PROCEDURE — 87389 HIV-1 AG W/HIV-1&-2 AB AG IA: CPT

## 2024-11-05 DEVICE — VALVE, AORTIC, 29MM, EVOLUT FX  TRANSCATHETER: Type: IMPLANTABLE DEVICE | Site: HEART | Status: NON-FUNCTIONAL

## 2024-11-05 DEVICE — LOADING SYS L-EVOLUTFX-2329
Type: IMPLANTABLE DEVICE | Site: CHEST | Status: FUNCTIONAL
Brand: EVOLUT™ FX

## 2024-11-05 RX ORDER — HEPARIN SODIUM 1000 [USP'U]/ML
INJECTION, SOLUTION INTRAVENOUS; SUBCUTANEOUS AS NEEDED
Status: DISCONTINUED | OUTPATIENT
Start: 2024-11-05 | End: 2024-11-05 | Stop reason: HOSPADM

## 2024-11-05 RX ORDER — NALOXONE HYDROCHLORIDE 0.4 MG/ML
INJECTION, SOLUTION INTRAMUSCULAR; INTRAVENOUS; SUBCUTANEOUS AS NEEDED
Status: DISCONTINUED | OUTPATIENT
Start: 2024-11-05 | End: 2024-11-05 | Stop reason: HOSPADM

## 2024-11-05 RX ORDER — PANTOPRAZOLE SODIUM 40 MG/1
40 TABLET, DELAYED RELEASE ORAL
Status: DISCONTINUED | OUTPATIENT
Start: 2024-11-06 | End: 2024-11-08

## 2024-11-05 RX ORDER — PROTAMINE SULFATE 10 MG/ML
INJECTION, SOLUTION INTRAVENOUS CONTINUOUS PRN
Status: COMPLETED | OUTPATIENT
Start: 2024-11-05 | End: 2024-11-05

## 2024-11-05 RX ORDER — VANCOMYCIN HYDROCHLORIDE 1 G/20ML
INJECTION, POWDER, LYOPHILIZED, FOR SOLUTION INTRAVENOUS DAILY PRN
Status: DISCONTINUED | OUTPATIENT
Start: 2024-11-05 | End: 2024-11-10 | Stop reason: HOSPADM

## 2024-11-05 RX ORDER — SENNOSIDES 8.6 MG/1
2 TABLET ORAL 2 TIMES DAILY
Status: DISCONTINUED | OUTPATIENT
Start: 2024-11-05 | End: 2024-11-07

## 2024-11-05 RX ORDER — LIDOCAINE HYDROCHLORIDE 10 MG/ML
INJECTION, SOLUTION EPIDURAL; INFILTRATION; INTRACAUDAL; PERINEURAL AS NEEDED
Status: DISCONTINUED | OUTPATIENT
Start: 2024-11-05 | End: 2024-11-05 | Stop reason: HOSPADM

## 2024-11-05 RX ORDER — NAPROXEN SODIUM 220 MG/1
324 TABLET, FILM COATED ORAL ONCE
Status: COMPLETED | OUTPATIENT
Start: 2024-11-05 | End: 2024-11-05

## 2024-11-05 RX ORDER — PANTOPRAZOLE SODIUM 40 MG/10ML
40 INJECTION, POWDER, LYOPHILIZED, FOR SOLUTION INTRAVENOUS
Status: DISCONTINUED | OUTPATIENT
Start: 2024-11-06 | End: 2024-11-05

## 2024-11-05 RX ORDER — VANCOMYCIN 2 GRAM/500 ML IN 0.9 % SODIUM CHLORIDE INTRAVENOUS
2000 ONCE
Status: DISCONTINUED | OUTPATIENT
Start: 2024-11-05 | End: 2024-11-06

## 2024-11-05 RX ORDER — NAPROXEN SODIUM 220 MG/1
81 TABLET, FILM COATED ORAL DAILY
Status: DISCONTINUED | OUTPATIENT
Start: 2024-11-05 | End: 2024-11-05

## 2024-11-05 RX ORDER — VANCOMYCIN HYDROCHLORIDE 1 G/200ML
INJECTION, SOLUTION INTRAVENOUS CONTINUOUS PRN
Status: COMPLETED | OUTPATIENT
Start: 2024-11-05 | End: 2024-11-05

## 2024-11-05 RX ORDER — MIDAZOLAM HYDROCHLORIDE 1 MG/ML
INJECTION, SOLUTION INTRAMUSCULAR; INTRAVENOUS AS NEEDED
Status: DISCONTINUED | OUTPATIENT
Start: 2024-11-05 | End: 2024-11-05 | Stop reason: HOSPADM

## 2024-11-05 RX ORDER — FLUMAZENIL 0.1 MG/ML
INJECTION INTRAVENOUS AS NEEDED
Status: DISCONTINUED | OUTPATIENT
Start: 2024-11-05 | End: 2024-11-05 | Stop reason: HOSPADM

## 2024-11-05 RX ORDER — TALC
6 POWDER (GRAM) TOPICAL NIGHTLY
Status: DISCONTINUED | OUTPATIENT
Start: 2024-11-05 | End: 2024-11-10 | Stop reason: HOSPADM

## 2024-11-05 RX ORDER — LIDOCAINE HYDROCHLORIDE AND EPINEPHRINE 20; 10 MG/ML; UG/ML
5 INJECTION, SOLUTION INFILTRATION; PERINEURAL ONCE
Status: DISCONTINUED | OUTPATIENT
Start: 2024-11-05 | End: 2024-11-05

## 2024-11-05 RX ORDER — PANTOPRAZOLE SODIUM 40 MG/1
40 TABLET, DELAYED RELEASE ORAL
Status: DISCONTINUED | OUTPATIENT
Start: 2024-11-06 | End: 2024-11-05

## 2024-11-05 RX ORDER — FENTANYL CITRATE 50 UG/ML
INJECTION, SOLUTION INTRAMUSCULAR; INTRAVENOUS AS NEEDED
Status: DISCONTINUED | OUTPATIENT
Start: 2024-11-05 | End: 2024-11-05 | Stop reason: HOSPADM

## 2024-11-05 ASSESSMENT — COGNITIVE AND FUNCTIONAL STATUS - GENERAL
TOILETING: A LITTLE
WALKING IN HOSPITAL ROOM: A LOT
MOBILITY SCORE: 16
TURNING FROM BACK TO SIDE WHILE IN FLAT BAD: A LITTLE
HELP NEEDED FOR BATHING: A LITTLE
MOVING TO AND FROM BED TO CHAIR: A LITTLE
MOVING FROM LYING ON BACK TO SITTING ON SIDE OF FLAT BED WITH BEDRAILS: A LITTLE
DAILY ACTIVITIY SCORE: 20
PERSONAL GROOMING: A LITTLE
DRESSING REGULAR UPPER BODY CLOTHING: A LITTLE
STANDING UP FROM CHAIR USING ARMS: A LITTLE
CLIMB 3 TO 5 STEPS WITH RAILING: A LOT

## 2024-11-05 ASSESSMENT — PAIN - FUNCTIONAL ASSESSMENT
PAIN_FUNCTIONAL_ASSESSMENT: 0-10

## 2024-11-05 ASSESSMENT — PAIN SCALES - GENERAL
PAINLEVEL_OUTOF10: 0 - NO PAIN
PAINLEVEL_OUTOF10: 1
PAINLEVEL_OUTOF10: 8
PAINLEVEL_OUTOF10: 8
PAINLEVEL_OUTOF10: 0 - NO PAIN
PAINLEVEL_OUTOF10: 1

## 2024-11-05 ASSESSMENT — PAIN DESCRIPTION - DESCRIPTORS: DESCRIPTORS: DISCOMFORT

## 2024-11-05 NOTE — PROGRESS NOTES
Vancomycin Dosing by Pharmacy- INITIAL    Faviola Pleitez is a 69 y.o. year old female who Pharmacy has been consulted for vancomycin dosing for cellulitis, skin and soft tissue. Based on the patient's indication and renal status this patient will be dosed based on a goal pre-HD level of 20-25.     Renal function is currently declining.    Visit Vitals  /58   Pulse 71   Temp 35.2 °C (95.4 °F) (Temporal)   Resp 12        Lab Results   Component Value Date    CREATININE 4.50 (H) 2024    CREATININE 4.13 (H) 2024    CREATININE 5.51 (H) 2024    CREATININE 4.66 (H) 2024        Patient weight is as follows:   Vitals:    24 0500   Weight: 123 kg (270 lb 11.6 oz)       Cultures:  No results found for the encounter in last 14 days.        I/O last 3 completed shifts:  In: 1062 (8.6 mL/kg) [P.O.:240; I.V.:422 (3.4 mL/kg); Other:400]  Out: 2500 (20.4 mL/kg) [Urine:200 (0 mL/kg/hr); Other:2300]  Weight: 122.8 kg   I/O during current shift:  I/O this shift:  In: -   Out: 30 [Blood:30]    Temp (24hrs), Av.1 °C (96.9 °F), Min:35.2 °C (95.4 °F), Max:37 °C (98.6 °F)         Assessment/Plan     Patient has already been given a loading dose of 1000 mg.  Will initiate vancomycin maintenance, a one time dose of 0 mg.    This dosing regimen is predicted by [employer] to result in the following pharmacokinetic parameters:  Pt on IHD; appears pt receiving IHD on MWF; will dose by levels    Follow-up level will be ordered on  at 0500 unless clinically indicated sooner.  Will continue to monitor renal function daily while on vancomycin and order serum creatinine at least every 48 hours if not already ordered.  Follow for continued vancomycin needs, clinical response, and signs/symptoms of toxicity.       Bettie Roa, PharmD

## 2024-11-05 NOTE — SIGNIFICANT EVENT
11/05/24 1636   Wound 11/01/24  Leg Proximal;Right;Upper;Anterior   Date First Assessed/Time First Assessed: 11/01/24 1440   Primary Wound Type: (c)   Location: Leg  Wound Location Orientation: Proximal;Right;Upper;Anterior   Wound Image Images linked   Shape    (Pt reports this is from insulin use )

## 2024-11-05 NOTE — PROGRESS NOTES
Spiritual Care Visit    Clinical Encounter Type  Visited With: Patient and family together  Routine Visit: Introduction  Referral To:     Orthodox Encounters  Orthodox Needs: Prayer         Sacramental Encounters  Sacrament of Sick-Anointing: Anointed    Patient received the Sacrament of the Anointing of the Sick by Fr. Hermilo Benavides,  [employer] .  Family members were preset.      Within the [employer] the Sacrament of the Sick, also known as the Anointing of the Sick, is a prayer in which we invoke the healing power of God.  Although considered part of 'Last Rites' the Anointing of the Sick, along with Eucharist and Reconciliation, is a repeatable sacrament and should be received by anyone about to undergo surgery, those in recovery and the elderly.  Those who are actively dying should receive the Anointing of the Sick for physical and spiritual healing.

## 2024-11-05 NOTE — SIGNIFICANT EVENT
Rapid Response Nurse Note:  [x] RADAR alert/Score 6    Pager time:   Arrival time:   Event end time:   Location: [facility] 5076  [x] Phone triage     Rapid response initiated by:  [] Rapid Response RN [] Family [] Nursing Supervisor [] Physician   [x] RADAR auto-page [] Sepsis auto-page [] RN [] RT   [] NP/PA [] Other:     Primary reason for call:   [] BAT [] New CPAP/BiPAP [] Bleeding [] Change in mental status   [] Chest pain [] Code blue [] FiO2 >/= 50% [] HR </= 40 bpm   [] HR >/= 130 bpm [] Hyperglycemia [] Hypoglycemia [x] RADAR    [] RR </= 8 bpm [] RR >/= 30 bpm [] SBP </= 90 mmHg [] SpO2 < 90%   [] Seizure [] Sepsis [] Staff concern:     Initial VS and/or RADAR VS:  radar vitals below in bold    Vitals:    24 1205 24 1253 24 1700 24   BP:  100/55 97/50 96/55   BP Location:  Right arm Left arm Left arm   Patient Position:  Sitting Sitting Sitting   Pulse: 66 84 71 74   Resp:  20 20 20   Temp:  36.3 °C (97.3 °F) 36.5 °C (97.7 °F) 36.1 °C (97 °F)   TempSrc:  Temporal Temporal Temporal   SpO2:  97% 95% 93%   Weight:       Height:            Interventions:  [x] None [] ABG [] Assist w/ICU transfer [] BAT paged    [] Bag mask [] Blood [] Cardioversion [] Code Blue   [] Code blue for intubation [] Code status changed [] Chest x-ray [] EKG   [] IV fluid/bolus [] KUB x-ray [] Labs/cultures [] Medication   [] Nebulizer treatment [] NIPPV (CPAP/BiPAP) [] Oxygen [] Oral airway   [] Peripheral IV [] Palliative care consult [] CT/MRI [] Sepsis protocol    [] Suctioned [] Other:       Outcome:  [] Coded and  [] Code blue for intubation [] Coded and transferred to ICU []  on division   [x] Remained on division (no change) [] Remained on division + additional monitoring [] Remained in ED [] Transferred to ED   [] Transferred to ICU [] Transferred to inpatient status [] Transferred for interventions (procedure) [] Transferred to ICU stepdown    [] Transferred to surgery []  Transferred to telemetry [] Sepsis protocol [] STEMI protocol   [] Stroke protocol [x] Bedside nurse instructed to page rapid response for any concerns or acute change in condition/VS     Additional Comments: Spoke to RN regarding vital signs.  No concerns from RN at this time.

## 2024-11-05 NOTE — TREATMENT PLAN
[facility] Questionnaire        1  Heart failure affects different people in different ways. Some feel shortness of breath while others feel fatigue. Please indicate how much you are limited by heart failure (shortness of breath or fatigue) in your ability to do the following activities over the past 2 weeks. 1 month Structural Heart NP follow-up      A.) Showering/bathing  4. Slightly  B.) Walking 1 block on level ground 4. Slightly  C.) Hurrying or Jogging   1. Extremely     2.  Over the past 2 weeks, how many times did you have swelling in your feet, ankles or legs when you woke up in the morning? 1. Every morning     3.  Over the past 2 weeks, on average, how many times has fatigue limited your ability to do what you wanted? 3. At least once a day     4.  Over the past 2 weeks, on average, how many times has shortness of breath limited your ability to do what you wanted? 2. Several times a day     5.  Over the past 2 weeks, on average, how many times have you been forced to sleep sitting up in a chair or with at least 3 pillows to prop you up because of shortness of breath? Every night     6. Over the past 2 weeks, how much has your heart failure limited your enjoyment of life? It has moderately limited my enjoyment of life     7. If you had to spend the rest of your life with your heart failure the way it is right now, how would you feel about this? 1. Not at all      8. How much does your heart failure affect your lifestyle? Please indicate how your heart failure may have limited yourparticipation in the following activities over the past 2 weeks     A.)  Hobbies, recreational activities  3. Moderately limited     B.) Working or doing household chores  4. Slightly limited     C.) Visiting family or friends out of your home  5. Did not limit at all       Walkin seconds, 5 meter

## 2024-11-05 NOTE — POST-PROCEDURE NOTE
Indication: Severe Aortic Stenosis  S/p preDil with TrueDil 21 mm balloon and 22 mm balloon and successful TAVR with Evolut FX+ 29 mm     Access  Primary: right CFA s/p 2 proglide  Secondary: left CFA 6 [place] s/p 1 proglide     TVP: left CFV, 7 [place]ic, sutured in place due to transient heart block.     Pre LVEDP: 20 mmHg  Post LVEDP:  22 mmHg     Post gradient TTE: 5 mmHg  mild PVL  No effusion     Conclusion  Monitor tele  Monitor access sites for bleeding  TTE tomorrow  Bed rest  Structural team will continue to follow.   page structural team for any concerning clinical events.

## 2024-11-05 NOTE — H&P
CARDIAC ICU ADMISSION NOTE    History of Present Illness  Faviola Pleitez is a 69 y.o. female with PMHx of diabetic ESRD on T/Th/S HD with left arm fistula, CAD, s/p PCI to LAD in 2015, then s/p PCI to LM and mid and proximal LAD with double stenting 11/01/2024, lymphedema c/b recurrent cellulitis of LE, AoCD, CHRF of unclear etiology (on 3L NC baseline), DM2, HLD, HTN, recently diagnosed HFmrEF at 31% and severe AS, and afib, on Eliquis. She is now s/p successful TAVR with Evolut FX+ 29 mm 11/5.    Admitted 10/22-10/28 [facility] with rhinovirus and viral vs bacterial bronchitis, and was also found to have new onset atrial fibrillation. Managed with Levaquin and steroids, and amiodarone and Eliquis, respectively. Cardiology there felt valves and coronaries needed evaluation and she agreed to transfer to [facility] 10/28.    No documented hospital course 10/28 - 11/5:  BNP 1,284 on admission. TTE 10/29/24: EF 31%, GHK, reduced RV systolic function, LA mildly dilated, mod mitral annular calcification, mod elevated RVSP, Severe calcific AS with gradients of 64/38.4mmHg and DI of 0.19 and mild AI. ( Note the gradients and VTI were averaged over 5 consecutive beats given atrial fibrillation ) consistent with severe aortic stenosis, severe AV cusp calc, mild AR. [facility] 11/1: Culprit vessel(s): left anterior descending. OCT guided successful PCI to LM and mid and proximal LAD with double stenting. Successful DCB for in stent restenosis of LAD stents. Lcx 50% ostial stenosis. Beyond stenting, underwent workup for TAVR. Does not appear to have had other complications. Completed 3 days of Levaquin 10/28-31 to finish course started for cellulitis at OSH.    CICU Course:  She is now s/p successful TAVR with Evolut FX+ 29 mm 11/5. Left CFV TVP placed as well for transient heart block. Pre LVEDP: 20 mmHg. Pst LVEDP:  22 mmHg. Post gradient TTE: 5 mmHg.      ED Course:  /62 (BP Location: Left arm, Patient Position: Sitting)    "Pulse 71   Temp 35.9 °C (96.6 °F) (Temporal)   Resp 20   Ht 1.626 m (5' 4\")   Wt 123 kg (270 lb 11.6 oz)   SpO2 98%   BMI 46.47 kg/m²          11/4/2024     5:00 PM 11/4/2024     7:47 PM 11/4/2024     8:34 PM 11/5/2024    12:03 AM 11/5/2024     4:27 AM 11/5/2024     5:00 AM 11/5/2024     7:31 AM   Vitals   Systolic 97 96 119 102 104  104   Diastolic 50 55 74 62 57  62   Heart Rate 71 74  83 59  71   Temp 36.5 °C (97.7 °F) 36.1 °C (97 °F)  37 °C (98.6 °F) 36 °C (96.8 °F)  35.9 °C (96.6 °F)   Resp 20 20  20 20  20   Weight (lb)      270.73    BMI      46.47 kg/m2    BSA (m2)      2.36 m2        Vent settings:    Most Recent Range Past 24hrs   Mode      FiO2   No data recorded   Rate   No data recorded   Vt    No data recorded   PEEP   No data recorded     Invasive Hemodynamics:    Most Recent Range Past 24hrs   BP (Art)   No data recorded   MAP(Art)   No data recorded   RA/CVP   No data recorded   PA   No data recorded   PA(mean)   No data recorded   PCWP   No data recorded   CO   No data recorded   CI   No data recorded   Mixed Venous   No data recorded   SVR    No data recorded   PVR   No data recorded       Labs:  Results from last 7 days   Lab Units 11/05/24  1243 11/05/24 0615 11/04/24  0433   WBC AUTO x10*3/uL 10.5 8.7 10.0   HEMOGLOBIN g/dL 9.7* 10.7* 10.7*   HEMATOCRIT % 30.7* 36.0 34.0*   PLATELETS AUTO x10*3/uL 75* 84* 100*     Results from last 7 days   Lab Units 11/05/24  0615 11/04/24  0433 11/03/24  0558   SODIUM mmol/L 140 138 139   POTASSIUM mmol/L 4.5 5.3 5.1   CO2 mmol/L 34* 30 32   ANION GAP mmol/L 13 17 16   BUN mg/dL 34* 55* 38*   CREATININE mg/dL 4.13* 5.51* 4.66*   GLUCOSE mg/dL 217* 108* 140*   EGFR mL/min/1.73m*2 11* 8* 10*   MAGNESIUM mg/dL 2.18 2.25 2.22   PHOSPHORUS mg/dL 2.4* 3.3 3.4            No lab exists for component: \"BILIT\"   Results from last 7 days   Lab Units 11/05/24  1243 11/05/24  0615   INR  1.3* 1.0     Results from last 7 days   Lab Units 11/05/24  1040   POCT PH, " "ARTERIAL pH 7.38   POCT PO2, ARTERIAL mm Hg 167*   POCT PCO2, ARTERIAL mm Hg 48*               No lab exists for component: \"BNPRESU\", \"CPKT\"            No results found for: \"CKTOTAL\", \"CKMB\", \"CKMBINDEX\", \"TROPONINI\"   Lab Results   Component Value Date    HGBA1C 7.0 (H) 08/07/2024      No results found for: \"CHOL\"  No results found for: \"HDL\"  No results found for: \"LDLCALC\"  No results found for: \"TRIG\"  No components found for: \"CHOLHDL\"     Imaging:  ECG 12 lead    Result Date: 11/5/2024  Atrial fibrillation with slow ventricular response with frequent ventricular-paced complexes Nonspecific intraventricular conduction delay ST & T wave abnormality, consider lateral ischemia Abnormal ECG When compared with ECG of 30-OCT-2024 21:48, Electronic ventricular pacemaker has replaced Atrial fibrillation      ED Interventions:  Medications   acetaminophen (Tylenol) tablet 650 mg ( oral MAR Unhold 11/5/24 1239)   melatonin tablet 3 mg ( oral MAR Unhold 11/5/24 1239)   polyethylene glycol (Glycolax, Miralax) packet 17 g ( oral MAR Unhold 11/5/24 1239)   aspirin EC tablet 81 mg ( oral MAR Unhold 11/5/24 1239)   atorvastatin (Lipitor) tablet 40 mg ( oral Dose Auto Held 11/9/24 0900)   calcium acetate (Phoslo) capsule 1,334 mg ( oral Dose Auto Held 11/9/24 1700)   midodrine (Proamatine) tablet 5 mg (5 mg oral Given 11/5/24 1350)   apixaban (Eliquis) tablet 5 mg ( oral Dose Auto Held 11/9/24 2100)   guaiFENesin (Mucinex) 12 hr tablet 600 mg ( oral MAR Unhold 11/5/24 1239)   fluticasone furoate-vilanteroL (Breo Ellipta) 100-25 mcg/dose inhaler 1 puff ( inhalation MAR Unhold 11/5/24 1239)   ondansetron (Zofran) injection 4 mg ( intravenous MAR Unhold 11/5/24 1239)   sennosides-docusate sodium (Hallie-Colace) 8.6-50 mg per tablet 1 tablet ( oral MAR Unhold 11/5/24 1239)   glucagon (Glucagen) injection 1 mg ( intramuscular MAR Unhold 11/5/24 1239)   glucagon (Glucagen) injection 1 mg ( intramuscular MAR Unhold 11/5/24 1239) "   glucagon HCL 1 mg ( intramuscular MAR Unhold 11/5/24 1239)   dextrose 50 % injection 10-50 mL ( intravenous MAR Unhold 11/5/24 1239)   amiodarone (Pacerone) tablet 200 mg ( oral Dose Auto Held 11/12/24 0900)   vitamin B complex-vitamin C-folic acid (Nephrocaps) capsule 1 capsule ( oral Unheld by provider 11/5/24 1304)   clopidogrel (Plavix) tablet 75 mg ( oral MAR Unhold 11/5/24 1239)   oxygen (O2) therapy ( inhalation MAR Unhold 11/5/24 1239)   Glucommander - insulin glargine (Lantus) injection 1-125 Units ( subcutaneous MAR Unhold 11/5/24 1239)     And   Glucommander - insulin lispro (HumaLOG) injection 0-125 Units ( subcutaneous MAR Unhold 11/5/24 1239)     And   Glucommander - insulin lispro (HumaLOG) injection 0-125 Units ( subcutaneous MAR Unhold 11/5/24 1239)   perflutren lipid microspheres (Definity) injection 0.5-10 mL of dilution ( intravenous MAR Unhold 11/5/24 1239)   aspirin chewable tablet 324 mg ( oral MAR Unhold 11/5/24 1239)   vancomycin (Vancocin) 2,000 mg in sodium chloride 0.9%  mL (has no administration in time range)   aspirin chewable tablet 81 mg (81 mg oral Given 11/5/24 1350)   pantoprazole (ProtoNix) EC tablet 40 mg (has no administration in time range)     Or   pantoprazole (ProtoNix) injection 40 mg (has no administration in time range)   lidocaine-epinephrine (Xylocaine W/EPI) 2 %-1:100,000 injection 5 mL (has no administration in time range)   sennosides (Senokot) tablet 17.2 mg (17.2 mg oral Given 11/5/24 1350)   pantoprazole (ProtoNix) EC tablet 40 mg ( oral Dose Auto Held 11/12/24 1600)   bisacodyl (Dulcolax) EC tablet 5 mg ( oral MAR Unhold 11/5/24 1239)   midazolam (Versed) injection (1 mg intravenous Given 11/5/24 1017)   fentaNYL PF (Sublimaze) injection (25 mcg intravenous Given 11/5/24 1017)   vancomycin (Vancocin) in dextrose 5% IV (1 g intravenous New Bag 11/5/24 0942)   lidocaine PF (Xylocaine) 10 mg/mL (1 %) injection (10 mL  Given 11/5/24 1038)   naloxone (Narcan)  injection (0.4 mg intravenous Given 11/5/24 1026)   flumazenil (Romazicon) injection (0.5 mg intravenous Given 11/5/24 1026)   heparin 1,000 unit/mL injection (12,000 Units intravenous Given 11/5/24 1049)   protamine injection (70 mg intravenous New Bag 11/5/24 1156)   iohexol (OMNIPaque) 350 mg iodine/mL solution (100 mL  Given 11/5/24 1201)   perflutren lipid microspheres (Definity) injection 0.5-10 mL of dilution (10 mL of dilution intravenous Given 10/29/24 1135)   levoFLOXacin (Levaquin) solution 250 mg (250 mg oral Given 10/30/24 1717)   predniSONE (Deltasone) tablet 40 mg (40 mg oral Given 10/30/24 0826)     Followed by   predniSONE (Deltasone) tablet 30 mg (30 mg oral Given 10/31/24 0906)     Followed by   predniSONE (Deltasone) tablet 20 mg (20 mg oral Given 11/1/24 0831)     Followed by   predniSONE (Deltasone) tablet 10 mg (10 mg oral Given 11/2/24 1327)   Glucommander (edit basal) - insulin glargine (Lantus) injection ( subcutaneous Completed in Glucommander 10/30/24 1102)   insulin regular (HumuLIN R,NovoLIN R) injection 10 Units (10 Units intravenous Given 10/30/24 2345)     Followed by   dextrose 50 % injection 25 g (25 g intravenous Given 10/30/24 2346)   iohexol (OMNIPaque) 350 mg iodine/mL solution 80 mL (80 mL intravenous Given 10/31/24 1025)   oxygen (O2) therapy (3 L/min inhalation New Bag 11/1/24 1222)   sodium zirconium cyclosilicate (Lokelma) packet 5 g (5 g oral Given 11/3/24 1734)       CARDIAC DATA:  EKG:  Encounter Date: 10/28/24   ECG 12 lead   Result Value    Ventricular Rate 51    Atrial Rate 39    QRS Duration 122    QT Interval 508    QTC Calculation(Bazett) 468    R Axis -13    T Axis 144    QRS Count 9    Q Onset 209    T Offset 463    QTC Fredericia 481    Narrative    Atrial fibrillation with slow ventricular response with frequent ventricular-paced complexes  Nonspecific intraventricular conduction delay  ST & T wave abnormality, consider lateral ischemia  Abnormal ECG  When  compared with ECG of 30-OCT-2024 21:48,  Electronic ventricular pacemaker has replaced Atrial fibrillation     Echocardiogram: CLAYTON([id number]:1:1825)   Echocardiogram 09/27/2024    Narrative    Left Ventricle: Moderately reduced left ventricular systolic function.  EF by visual approximation is 42%. Left ventricle is dilated. Normal wall  thickness. Moderate global hypokinesis present.    Right Ventricle: Right ventricle is mildly dilated. Low normal systolic  function. TAPSE is normal. Global hypokinesis present.    Aortic Valve: Trileaflet valve. Severely calcified right and  noncoronary cusps. Mild regurgitation. Severe stenosis of the aortic  valve. AV mean gradient is 49 mmHg. AV area by continuity VTI is 0.6 cm2.    Mitral Valve: Mild annular calcification at the posterior leaflet.  Increased E-point septal separation suggesting decreased forward cardiac  output. Moderate regurgitation.    Tricuspid Valve: Mild regurgitation. The estimated RVSP is 58 mmHg.  Moderately elevated RVSP, consistent with moderate pulmonary hypertension.  The estimated RVSP is 58 mmHg.    Left Atrium: Left atrium is mildly dilated.    Pericardium: Trivial/physiologic pericardial effusion present. No  indication of cardiac tamponade.    Image quality is technically difficult. Contrast used: Definity.  Technically difficult study with poor endocardial visualization and  technically difficult study due to patient's body habitus.    Left Ventricle  Moderately reduced left ventricular systolic function. EF by visual approximation is 42%. Left ventricle is dilated. Normal wall thickness. Moderate global hypokinesis present. Indeterminate diastolic function due to mitral valve disease.    Right Ventricle  Right ventricle is mildly dilated. Low normal systolic function. TAPSE is normal. Global hypokinesis present.    Left Atrium  Left atrium is mildly dilated.    Right Atrium  Right atrium size is normal.    IVC/SVC  IVC diameter is  greater than 21 mm and decreases less than 50% during inspiration; therefore the estimated right atrial pressure is elevated (~15 mmHg). IVC is dilated.    Mitral Valve  Mild annular calcification at the posterior leaflet. Increased E-point septal separation suggesting decreased forward cardiac output. Moderate regurgitation.    Tricuspid Valve  Valve structure is normal. Mild regurgitation. The estimated RVSP is 58 mmHg. Moderately elevated RVSP, consistent with moderate pulmonary hypertension. The estimated RVSP is 58 mmHg.    Aortic Valve  Trileaflet valve. Severely calcified right and noncoronary cusps. Mild regurgitation. Severe stenosis of the aortic valve. AV mean gradient is 49 mmHg. AV area by continuity VTI is 0.6 cm2.    Pulmonic Valve  Valve structure is normal. Physiologically normal regurgitation.    Ascending Aorta  Normal sized sinus of Valsalva and aortic root. Grade 1, calcified atherosclerosis of the sinus of Valsalva and ascending aorta.    Pericardium  Trivial/physiologic pericardial effusion present. No indication of cardiac tamponade.    Study Details  Image quality: technically difficult. Technically difficult study with poor endocardial visualization, technically difficult study due to patient's body habitus, color flow Doppler was performed and pulse wave and/or continuous wave Doppler was performed. Definity contrast was given to enhance imaging.    Stress Testing IMGRESULT(JRN9299:1:1825): No results found for this or any previous visit from the past 1825 days.    Cardiac Catheterization: No results found for this or any previous visit from the past 1825 days.  Left & Right Heart Cath w Angiography & LV 11/01/2024    [facility], Cath Lab, [address] [place] [address]    Cardiovascular Catheterization Report    Patient Name:      JUDY SAGASTUME       Performing Physician:  [id number] Joesph Francois MD  Study Date:        11/1/2024            Verifying  Physician:   [id number] Joesph Francois MD  MRN/PID:           [id number]             Cardiologist/Co-Scrub:  Accession#:        [id number]         Ordering Provider:     [id number] DUARTE MONIQUE  Date of Birth/Age: 1955 / 69 years Cardiologist:  Gender:            F                    Fellow:                [id number] Noah Corona MD  Admit Date:                             Fellow:                [id number] Seble Steele MD  Encounter#:        [id number]           Surgeon:      Study:            Left Heart Cath with PCI  Additional Study: Rotational Atherectomy  Additional Study: OCT - Optical Coherence Tomography      Indications:  JUDY SAGASTUME is a 70 year old female who presents with prior percutaneous coronary intervention, hypertension, dyslipidemia and obesity. JUDY SAGASTUME is a 70 year old female who presents with aortic stenosis and an anginal equivalent chest pain assessment (i.e. dyspnea on exertion believed to be from ischemia). Valvular disease. Silent ischemia in an asymptomatic patient.  Medical History:  Stress test performed: No. CTA performed: No. Melissa accessed: No. LVEF  Assessed: No.  Cardiac arrest: No responsive following cardiac arrest.  Cardiac surgical consult: No.  Cardiovascular instability: No.  Frailty status of patient entering lab: 5 = Mildly frail.      Procedure Description:  After infiltration with 1% Lidocaine, the right femoral artery was cannulated with a modified Seldinger technique. Subsequently a 6 Sinhala sheath was placed retrograde in the right femoral artery. Additional catheter(s) used to visualize the coronary arteries were: EBU 3.5. Multiple injections of contrast were made into the left and right coronary arteries with angiograms recorded in multiple projections. After completion of the procedure, femoral artery angiography was performed. This demonstrated a common femoral artery puncture appropriate for closure. An Angio-Seal VIP 6F ([employer]) vascular closure  device was placed per protocol.    Coronary Angiography:  The coronary circulation is right dominant.    Left Main Coronary Artery:  The left main coronary artery is a normal caliber vessel. The left main arises normally from the left coronary sinus of Valsalva and bifurcates into the LAD and circumflex coronary arteries. The left main coronary artery showed mild atherosclerotic disease.    Left Anterior Descending Coronary Artery Distribution:  The left anterior descending coronary artery is a normal caliber vessel. The LAD arises normally from the left main coronary artery. The LAD demonstrated atherosclerotic disease, severe atherosclerotic disease and severe in-stent restenosis. The proximal left anterior descending coronary artery showed 80% stenosis. An additional lesion, located at the mid left anterior descending coronary artery, revealed 80% stenosis. This second lesion was calcified. A third lesion, located at the proximal LAD, demonstrated 75% in-stent restenosis.    Circumflex Coronary Artery Distribution:  The circumflex coronary artery is a normal caliber vessel. The circumflex arises normally from the left main coronary artery and terminates in the AV groove. The circumflex revealed calcification. The ostial circumflex coronary artery showed 50% stenosis.    Right Coronary Artery Distribution:    The right coronary artery is a normal caliber vessel. The RCA arises normally from the right sinus of Valsalva. The RCA showed mild atherosclerotic disease.    Coronary Intervention Comments:  We did not use the raidal as we could not feel a radial pulse, which we thought was likely occluded. We achieved LFA using USS guidance. Used a JR4, JL 3.5 to take diagnostic pictures. We found significant stenosis mid LAD, in stent re stenosis and proximal LAD. We used an EBU 3.5 guide catheter to engage the LM. Run through wire to cross the lesion in the LM. We decided to OCT to understand the plaque morphology and  also the mechanism of stent failure. Unfortunatley the OCT catheter would not advance through the lesion. We decided to pre dilate with a 2.0X25 semi compliant balloon, inflated at 16A. We then introduced an angiosculpt 2.0X15 to further pre dilate and plaque modify. Further pre dilatation with a 2.0X12 NC euphora at 22A. Given the calcific nature of the disease we decided to proceed with a rotablation. We used a 1.5burr, calibrated to 170RPMs. We exchanged the run through wire for a rota floppy wire. Now we did 3 passess of rotational atherectomy from the proximal to mid  LAD. The rotawire was removed and exchanged for a run through wire. We introduced OCT catheter into the LAD, the catheter crossed easily this time. The OCT images revealed that there were 2 layers of stent, and there was severe underxpansion of the inner stent and also significant NIH. The referrence luminal diamter was ~2.2mm however the stent diameter revealed 1.7mm. There was severe disease in the proximal LAD and also the ostial LCx. Now we used a 2.75NC balloon to pre dilate the LAD lesion proximal the stent at 18 and 24A. We used the angiosculpt 2.0 again for the NIH of the in stent restenosis to prepare the lesion.  2.90S87ie synergy stent was implanted in the mid LAD at 22A. We used a 3.0X20mm DCB inflated at 10A for 40 seconds. There was good expansion of the DCB. Given there was severe ostial LCx stenosis we protected the vessel with another runthrough wire. We implanted a 3.0X16mm stent in the proximal LAD at 15A. We post dilated the stent with a 3.5X12 NC. Very good angiographic results. The catheter was removed and the artereotomy site was closed using a perclose.    Coronary Lesion Summary:  Vessel            Stenosis         Vessel Segment  Left Main                             proximal  LAD             80% stenosis          proximal  LAD             80% stenosis            mid  LAD        75% in-stent restenosis     proximal  Circumflex      50% stenosis           ostial      Hemo Personnel:  +--------------------+---------+  Name                Duty       +--------------------+---------+  Joesph Francois MD 1  +--------------------+---------+      Hemodynamic Pressures:    +----+---------------------+----------+-------------+--------------+---------+  Site      Date Time      Phase NameSystolic mmHgDiastolic mmHgMean mmHg  +----+---------------------+----------+-------------+--------------+---------+    AO11/1/2024 12:34:10 PM      Rest           87            37       53  +----+---------------------+----------+-------------+--------------+---------+    AO 11/1/2024 1:45:58 PM      Rest           89            43       59  +----+---------------------+----------+-------------+--------------+---------+    AO 11/1/2024 1:53:00 PM      Rest           92            51       66  +----+---------------------+----------+-------------+--------------+---------+      Cardiac Cath Post Procedure Notes:  Post Procedure Diagnosis: OCT guided, successful rotational atherectomy of mid  LAD, DCB of instent re stenosis of proximal LAD and  stent of the proximal LAD.  Blood Loss:               Estimated blood loss during the procedure was 10 mls.  Specimens Removed:        Number of specimen(s) removed: none.      Recommendations:  Maximize medical therapy.  Agressive risk factor modification efforts.  Anti-platelet therapy with Aspirin and Clopidogrel (Plavix).  Aortic valve replacement.    ____________________________________________________________________________________  CONCLUSIONS:  1. Culprit vessel(s): left anterior descending.  2. OCT guided successful PCI to LM and mid and proximal LAD with double stenting.  3. Successful DCB for in stent restenosis of LAD stents.    ICD 10 Codes:  Dyspnea, unspecified-R06.00    CPT Codes:  Ultrasound guidance for vascular access-86136; Angiography,  Extremity,uni,S&I (PER)-26009; Stent w angio ather Left Anterior Descending single major Artery branch (PCI)-65010.LD; OCT Initial Vessel (coronary native vessel or graft) during diagnostic evaluation and/or therapeutic intervention, initial vessel-75649; Stent Left Main ea addl branch of major Artery (PCI)-63129.LM; Complicated/Unusual Procedure-MOD22    [id number] Joesph Francois MD  Performing Physician  Electronically signed by [id number] Joesph Francois MD on 11/2/2024 at 1:54:12 AM          ** Final (Updated) **     Cardiac Scoring: No results found for this or any previous visit from the past 1825 days.    AAA : No results found for this or any previous visit from the past 1825 days.    OTHER: No results found for this or any previous visit from the past 1825 days.      ASCVD RISK: The ASCVD Risk score (Maryellen DK, et al., 2019) failed to calculate for the following reasons:    Risk score cannot be calculated because patient has a medical history suggesting prior/existing ASCVD      Past Medical History  Past Medical History:   Diagnosis Date    Acute bronchitis due to Rhinovirus 10/26/2024    Anemia     due to ESRD    Aneurysm (CMS-HCC)     brain    Anxiety     Arrhythmia     Atrial fibrillation (Multi) 10/26/2024    C. difficile colitis     Chronic kidney disease     Chronic respiratory failure     COPD (chronic obstructive pulmonary disease) (Multi)     Coronary artery disease     Depression     Diabetes mellitus (Multi)     ESRD (end stage renal disease) on dialysis (Multi)     Gallstones     Heart murmur     Hyperlipidemia     Hypertension     Lymphedema associated with obesity     Morbid obesity with BMI of 45.0-49.9, adult (Multi)     Neck pain     Pulmonary embolism     Pulmonary hypertension (Multi)     Sleep apnea        Surgical History  Past Surgical History:   Procedure Laterality Date    AV FISTULA PLACEMENT, BRACHIOCEPHALIC Left 01/28/2022    CARDIAC CATHETERIZATION  2015    CARDIAC CATHETERIZATION N/A  2024    Procedure: Left & Right Heart Cath w Angiography & LV;  Surgeon: Joesph Francois MD;  Location: [place] [address] Cardiac Cath Lab;  Service: Cardiovascular;  Laterality: N/A;  ESRD T-Th-Sat     SECTION, CLASSIC      EYE SURGERY Bilateral 2023    IVC FILTER RETRIEVAL  2023    NO MENTION OF RETRIEVAL    UPPER GASTROINTESTINAL ENDOSCOPY  2021    UPPER GASTROINTESTINAL ENDOSCOPY  2023    VASCULAR SURGERY  2021       Social History  She reports that she quit smoking about 12 years ago. Her smoking use included cigarettes. She has never used smokeless tobacco. She reports that she does not drink alcohol and does not use drugs.    Family History  Family History   Problem Relation Name Age of Onset    Coronary artery disease Mother      Coronary artery disease Father      Stroke Father      Coronary artery disease Brother          Allergies  Morphine, Tessalon [benzonatate], Bleach (sodium hypochlorite), and Penicillins    Home Medications  Prior to Admission medications    Medication Sig Start Date End Date Taking? Authorizing Provider   apixaban (Eliquis) 5 mg tablet Take 1 tablet (5 mg) by mouth 2 times a day. 10/31/24   Donte Herrera PA-C   aspirin 81 mg EC tablet Take 1 tablet (81 mg) by mouth once daily.    Historical Provider, MD   atorvastatin (Lipitor) 40 mg tablet Take 1 tablet (40 mg) by mouth once daily in the morning.    Historical Provider, MD   calcium acetate (Phoslo) 667 mg capsule Take 2 capsules (1,334 mg) by mouth 3 times daily (morning, midday, late afternoon). With meals    Historical Provider, MD   insulin aspart, with niacinamide, (Fiasp FlexTouch U-100 Insulin) 100 unit/mL (3 mL) injection Inject 12 Units under the skin 3 times daily (morning, midday, late afternoon). Take as directed per insulin instructions before meals    Historical Provider, MD   insulin glargine (Basaglar KwikPen U-100 Insulin) 100 unit/mL (3 mL) pen Inject 12 Units under the  skin once daily in the morning. Take as directed per insulin instructions.    Historical Provider, MD   midodrine (Proamatine) 5 mg tablet Take 1 tablet prior to dialysis on Tuesday, Thursday and Saturday    Historical Provider, MD        Physical Exam    Constitutional: No acute distress, resting comfortably in bed on 6L NC, cooperative  HEENT: Normocephalic, atraumatic, PERRLA, EOMI, moist mucous membranes, no pharyngeal erythema  Cardiovascular: RRR, normal S1/S2, systolic murmur  Pulmonary: Clear to auscultation b/l, no wheezes/crackles/rhonchi, no increased work of breathing, no supplemental oxygen  GI: Soft, non-tender, non-distended, normoactive bowel sounds  : No torres catheter  Lower extremities: Cool feet with weak pulses BL, pitting edema to knees. CVI skin changes, erythema, edema, and bullae BL shins  Neuro: A&O x3, able to move all 4 extremities spontaneously, normal gait  Psych: Appropriate mood and affect     Medications  Scheduled medications  [Held by provider] amiodarone, 200 mg, oral, Daily  [Held by provider] apixaban, 5 mg, oral, BID  aspirin, 324 mg, oral, Once  aspirin, 81 mg, oral, Daily  aspirin, 81 mg, oral, Daily  [Held by provider] atorvastatin, 40 mg, oral, q AM  [Held by provider] calcium acetate, 1,334 mg, oral, TID  clopidogrel, 75 mg, oral, Daily  fluticasone furoate-vilanteroL, 1 puff, inhalation, Daily  Glucommander - insulin glargine, 1-125 Units, subcutaneous, Daily   And  Glucommander - insulin lispro, 0-125 Units, subcutaneous, With meals & nightly  lidocaine-epinephrine, 5 mL, injection, Once  midodrine, 5 mg, oral, TID  oxygen, , inhalation, Continuous - Inhalation  [START ON 11/6/2024] pantoprazole, 40 mg, oral, Daily before breakfast   Or  [START ON 11/6/2024] pantoprazole, 40 mg, intravenous, Daily before breakfast  [Held by provider] pantoprazole, 40 mg, oral, BID AC  perflutren lipid microspheres, 0.5-10 mL of dilution, intravenous, Once in imaging  sennosides, 2  tablet, oral, BID  sennosides-docusate sodium, 1 tablet, oral, Nightly  vancomycin, 2,000 mg, intravenous, Once  vitamin B complex-vitamin C-folic acid, 1 capsule, oral, Daily        Continuous medications  protamine,   vancomycin,         PRN medications  PRN medications: acetaminophen, bisacodyl, dextrose, fentaNYL PF, flumazenil, glucagon, glucagon, glucagon HCL, Glucommander - insulin glargine **AND** Glucommander - insulin lispro **AND** Glucommander - insulin lispro **AND** POCT Glucose, guaiFENesin, heparin, iohexol, lidocaine PF, melatonin, midazolam, naloxone, ondansetron, polyethylene glycol, protamine, vancomycin      Assessment/Plan   Faviola Pleitez is a 69 y.o. female with PMHx of diabetic ESRD on T/Th/S HD with left arm fistula, CAD, s/p PCI to LAD in 2015, then s/p PCI to LM and mid and proximal LAD with double stenting 11/01/2024, lymphedema c/b recurrent cellulitis of LE, AoCD, CHRF of unclear etiology (on 3L NC baseline), DM2, HLD, HTN, recently diagnosed HFmrEF at 31% and severe AS, and afib, on Eliquis. She is now s/p successful TAVR with Evolut FX+ 29 mm 11/5. Transferred to CICU for post-procedural management - largely stable, though requiring intermittent pacing via TVP.    NEURO:  #Pain control  - PRN Tylenol    CARDS:  #Severe AS, s/p successful TAVR with Evolut FX+ 29 mm 11/5  #Transient heart block  TTE 10/29/24: EF 31%, GHK, reduced RV systolic function, LA mildly dilated, mod mitral annular calcification, mod elevated RVSP, Severe calcific AS with gradients of 64/38.4mmHg and DI of 0.19 and mild AI. ( Note the gradients and VTI were averaged over 5 consecutive beats given atrial fibrillation ) consistent with severe aortic stenosis, severe AV cusp calc, mild AR. S/p successful TAVR with Evolut FX+ 29 mm 11/5. Left CFV TVP placed as well for transient heart block. Pre LVEDP: 20 mmHg. Pst LVEDP:  22 mmHg. Post gradient TTE: 5 mmHg.  - Repeat TTE 11/6  - TVP set to 50   > CTM, EP consult  "11/6 to assess need for PPM     #New HFmrEF, 31%  #Acute systolic and diastolic heart failure, decompensated  #CAD s/p PCI, 11/1/24  TTE report as above - global hypokinesis. BNP 1284 on admission. [facility] 11/1: Culprit vessel(s): left anterior descending. OCT guided successful PCI to LM and mid and proximal LAD with double stenting. Successful DCB for in stent restenosis of LAD stents. Lcx 50% ostial stenosis.  - New HF workup ordered 11/5   > Likely ICM  - Volume management with HD   - GDMT limited due to ESRD on HD  - DAPT with ASA and stain  > Triple therapy plan with AC (for Afib) and plavix to be determined by structural heart team s/p TAVR  - Atorva 40 mg daily     #Newly diagnosed afib  10/25/24 at OSH.  - Restart AC AM of 11/6 if femoral access site stable  - Amiodarone 200mg daily     #PULM  #CHRF, unclear etiology, possible COPD?  #Rhinovirus positive at OSH, resolved  #Pulmonary nodules, incidental finding  CT TAVR: Multiple solid non-calcified pulmonary nodules measuring up to 7 mm. In absence of prior comparative examinations, to ensure stability, consider follow up non contrast chest CT at 3-6 months. Completed prednisone taper per OSH recs during this [facility] admission.  - Baseline 3L NC  - Breo-Ellipta daily     NEPHRO:  #Diabetic ESRD on HD T, Th, Sat via left arm fistula  S/p HD 10/29, 10/31, 11/2, 11/4.  - Midodrine 5 mg TID     HEME:  #Anemia of chronic disease  Hemoglobin 10.7 to 9.7 s/p TAVR.  - CTM  - Consider IV iron     ID:  #Acute Cellulitis  #Poor nail integrity  #Hx lymphedema  Appears to have completed a course of Levaquin through 10/30 (initiated at OSH), and also \"recently completed metronidazole course,\" again, at OSH. No culture data. Ongoing cellulitis on exam 11/5, with bullae.  - Initiate vancomycin  - Wound care following  - Consider podiatry consult    ENDO:  #DM2  A1c 7 in August. Repeat not checked previously in course.  - A1c ordered  - Initiated glucommander    DVT prophylaxis: " SCDs    Code Status: Full Code (confirmed on admission)   NOK: Extended Emergency Contact Information  Primary Emergency Contact: Susie Sagastume  Mobile Phone: [phone]  Relation: Daughter  Secondary Emergency Contact: SINDHU SAGASTUME  Mobile Phone: [phone]  Relation: Son         Lisette Fabian MD  Internal Medicine and Pediatrics, PGY-2  [place]

## 2024-11-05 NOTE — HOSPITAL COURSE
Faviola Pleitez is a 69 y.o. female with PMHx of diabetic ESRD on T/Th/S HD with left arm fistula, CAD, s/p PCI to LAD in 2015, then s/p PCI to LM and mid and proximal LAD with double stenting 11/01/2024, lymphedema c/b recurrent cellulitis of LE, AoCD, CHRF of unclear etiology (on 3L NC baseline), DM2, HLD, HTN, recently diagnosed HFmrEF at 31% and severe AS, and afib, on Eliquis. She is now s/p successful TAVR with Evolut FX+ 29 mm 11/5.    Admitted 10/22-10/28 [facility] with rhinovirus and viral vs bacterial bronchitis, and was also found to have new onset atrial fibrillation. Managed with Levaquin and steroids, and amiodarone and Eliquis, respectively. Cardiology there felt valves and coronaries needed evaluation and she agreed to transfer to [facility] 10/28.    10/28 - 11/5:  BNP 1,284 on admission. TTE 10/29/24: EF 31%, GHK, reduced RV systolic function, LA mildly dilated, mod mitral annular calcification, mod elevated RVSP, Severe calcific AS with gradients of 64/38.4mmHg and DI of 0.19 and mild AI. ( Note the gradients and VTI were averaged over 5 consecutive beats given atrial fibrillation ) consistent with severe aortic stenosis, severe AV cusp calc, mild AR. [facility] 11/1: Culprit vessel(s): left anterior descending. OCT guided successful PCI to LM and mid and proximal LAD with double stenting. Successful DCB for in stent restenosis of LAD stents. Lcx 50% ostial stenosis. Beyond stenting, underwent workup for TAVR. Does not appear to have had other complications. Completed 3 days of Levaquin 10/28-31 to finish course started for cellulitis at OSH.    CICU Course:  She is now s/p successful TAVR with Evolut FX+ 29 mm 11/5. Left CFV TVP placed for transient heart block. Pre LVEDP: 20 mmHg. Pst LVEDP:  22 mmHg. Post gradient TTE: 5 mmHg. TVP in place, set rate to 50. Started on vancomycin due to concern for ongoing LE cellulitis. On 11/6, she didn't require pacing overnight and after discussing with structural  pulled her TVP. Subsequently patient coded to asystole. At this point we started compressions and gave 1 round of epinephrine, achieved ROSC and started pacing transcutaneously. Thereafter, patient was emergently taken to cath lab for placement of TVP again. She was brought back to the CICU and lost beat again for a couple beats due to lost capture. She is currently being paced and after cath lab she was hypotensive. Started patient on levo and dopamine. Arterial line was placed. She was intubated as she was vomiting fluids. Thereafter patient started to develop a hematoma in the groin for which pressure was applied. Monitoring for hematoma expansion which was found to be a pseudoaneurysm. Micra PPM was placed in RV with TVP replaced for a trialysis line. CVVH was started on the patient. Subsequently on  night patient was trialed on dobutamine as it was thought she had a combination of septic and cardiogenic shock.. Patient did not tolerate this well and had to turn it off after just 3 min. Was given epinephrine pushes and placed on an epi gtt that was later titrated off. Micra settings changed to be set to 90 bpm to increase cardiac output. Despite these measures and also placing patient on broad spectrum abx, patient continued to worsen with increasing lactic acid and worsening limb ischemia. Eventually on 11/10 morning, patient decompensated very quickly. At 0100, pressures were maxed out on epi, levo, phenyl. Patient given calcium chloride, bicarb, epi, calcium, and LR bolus. Also received blood and platelets. Despite these measures patient  at 0240 with family at bedside.

## 2024-11-05 NOTE — CONSULTS
Inpatient consult to Electrophysiology  Consult performed by: Betsy Garcias MD  Consult ordered by: Naman Maher DO  Reason for consult: Post-TAVR CHB        History Of Present Illness:    Faviola Pleitez is a 69 y.o. female with PMH of ESRD/HD, multivessel obstructive CAD with previous stent (LM-LAD prox 80-90% s/p LAD under-expanded stents, severe mid LAD 90%, severe prox 95% LCx), lymphedema, anemia of chronic disease, obesity (BMI 46.5), chronic respiratory failure (on 3LNC at home), DM2, HLP, and HTN, who underwent successful TAVR with preDil with TrueDil 21 mm balloon and 22 mm balloon and successful TAVR with Evolut FX+ 29 mm. The procedure was complicated with transient CHB and EP was consulted for the further evaluation.     12-lead ECG 11/5/2024 after TAVR:      12-lead ECG 10/2024:       Last Recorded Vitals:  Vitals:    11/05/24 0003 11/05/24 0427 11/05/24 0500 11/05/24 0731   BP: 102/62 104/57  104/62   BP Location: Left arm   Left arm   Patient Position: Sitting   Sitting   Pulse: 83 59  71   Resp: 20 20  20   Temp: 37 °C (98.6 °F) 36 °C (96.8 °F)  35.9 °C (96.6 °F)   TempSrc: Temporal Temporal  Temporal   SpO2: 95% 97%  98%   Weight:   123 kg (270 lb 11.6 oz)    Height:         Past Medical History:  She has a past medical history of Acute bronchitis due to Rhinovirus (10/26/2024), Anemia, Aneurysm (CMS-HCC), Anxiety, Arrhythmia, Atrial fibrillation (Multi) (10/26/2024), C. difficile colitis, Chronic kidney disease, Chronic respiratory failure, COPD (chronic obstructive pulmonary disease) (Multi), Coronary artery disease, Depression, Diabetes mellitus (Multi), ESRD (end stage renal disease) on dialysis (Multi), Gallstones, Heart murmur, Hyperlipidemia, Hypertension, Lymphedema associated with obesity, Morbid obesity with BMI of 45.0-49.9, adult (Multi), Neck pain, Pulmonary embolism, Pulmonary hypertension (Multi), and Sleep apnea.    Past Surgical History:  She has a past surgical history  that includes  section, classic; Vascular surgery (2021); Upper gastrointestinal endoscopy (2021); AV fistula, brachiocephalic (Left, 2022); Cardiac catheterization (); Upper gastrointestinal endoscopy (2023); IVC filter retrieval (2023); Eye surgery (Bilateral, 2023); and Cardiac catheterization (N/A, 2024).      Social History:  She reports that she quit smoking about 12 years ago. Her smoking use included cigarettes. She has never used smokeless tobacco. She reports that she does not drink alcohol and does not use drugs.    Family History:  Family History   Problem Relation Name Age of Onset    Coronary artery disease Mother      Coronary artery disease Father      Stroke Father      Coronary artery disease Brother          Allergies:  Morphine, Tessalon [benzonatate], Bleach (sodium hypochlorite), and Penicillins    Inpatient Medications:  Scheduled medications   Medication Dose Route Frequency    [Held by provider] amiodarone  200 mg oral Daily    [Held by provider] apixaban  5 mg oral BID    aspirin  324 mg oral Once    aspirin  81 mg oral Daily    aspirin  81 mg oral Daily    [Held by provider] atorvastatin  40 mg oral q AM    [Held by provider] calcium acetate  1,334 mg oral TID    clopidogrel  75 mg oral Daily    fluticasone furoate-vilanteroL  1 puff inhalation Daily    Glucommander - insulin glargine  1-125 Units subcutaneous Daily    And    Glucommander - insulin lispro  0-125 Units subcutaneous With meals & nightly    lidocaine-epinephrine  5 mL injection Once    midodrine  5 mg oral TID    oxygen   inhalation Continuous - Inhalation    [START ON 2024] pantoprazole  40 mg oral Daily before breakfast    Or    [START ON 2024] pantoprazole  40 mg intravenous Daily before breakfast    [Held by provider] pantoprazole  40 mg oral BID AC    perflutren lipid microspheres  0.5-10 mL of dilution intravenous Once in imaging    sennosides  2 tablet oral  BID    sennosides-docusate sodium  1 tablet oral Nightly    vancomycin  2,000 mg intravenous Once    vitamin B complex-vitamin C-folic acid  1 capsule oral Daily     PRN medications   Medication    acetaminophen    bisacodyl    dextrose    fentaNYL PF    flumazenil    glucagon    glucagon    glucagon HCL    Glucommander - insulin lispro    guaiFENesin    heparin    iohexol    lidocaine PF    melatonin    midazolam    naloxone    ondansetron    polyethylene glycol    protamine    vancomycin     Continuous Medications   Medication Dose Last Rate    protamine        vancomycin         Outpatient Medications:  Current Outpatient Medications   Medication Instructions    apixaban (ELIQUIS) 5 mg, oral, 2 times daily    aspirin 81 mg, oral, Daily    atorvastatin (LIPITOR) 40 mg, oral, Every morning    Basaglar KwikPen U-100 Insulin 12 Units, subcutaneous, Every morning, Take as directed per insulin instructions.    calcium acetate (PHOSLO) 1,334 mg, oral, 3 times daily (morning, midday, late afternoon), With meals    insulin aspart, with niacinamide, (Fiasp FlexTouch U-100 Insulin) 100 unit/mL (3 mL) injection 12 Units, subcutaneous, 3 times daily (morning, midday, late afternoon), Take as directed per insulin instructions before meals    midodrine (Proamatine) 5 mg tablet Take 1 tablet prior to dialysis on Tuesday, Thursday and Saturday        Physical Exam:  General: NAD, sitting in chair  Skin: warm and dry   Head/ neck: no JVD seen at 90 degrees  Cardiac: RRR, S1, S2   Pulm: CTAB, O2 via nc   GI: soft, nontender   Extremities: no LE edema, chronic bilateral lymphedema; right fem groin site with minimal ecchymosis, nontender, BLE warm; LUE AVF  Neuro: no focal neuro deficits   Psych: appropriate mood and behavior         Assessment/Plan   Faviola Pleitez is a 69 y.o. female with PMH of ESRD/HD, multivessel obstructive CAD with previous stent (LM-LAD prox 80-90% s/p LAD under-expanded stents, severe mid LAD 90%, severe  prox 95% LCx), ICM/HFrEF (LVEF 30% in 10/2024), lymphedema, anemia of chronic disease, obesity (BMI 46.5), chronic respiratory failure (on 3LNC at home), DM2, HLP, and HTN, who underwent successful TAVR with preDil with TrueDil 21 mm balloon and 22 mm balloon and successful TAVR with Evolut FX+ 29 mm. The procedure was complicated with transient CHB and EP was consulted for the further evaluation.    EP Problems:  - Post-TAVR transient CHB with spontaneous improvement of AV conduction  - NO NEW post-TAVR bundle branch block or wider QRS suggestive for infra-joey conduction disease    Recommendations:  - Keep TVP back-up pacing 40-60 bpm  - Avoid any kinds of AVN blocking agents    EP will follow this case later.    Betsy Garcias MD  Clinical Cardiac Electrophysiology Fellow      Peripheral IV 20 G Right Antecubital (Active)   Site Assessment Clean;Dry;Intact 11/05/24 0700   Dressing Type Transparent 11/05/24 0700   Line Status Flushed;Capped 11/05/24 0700   Dressing Status Clean;Dry 11/05/24 0700   Number of days: 8       Peripheral IV 10/29/24 22 G Right;Anterior Forearm (Active)   Site Assessment Clean;Intact;Dry 11/05/24 0700   Dressing Type Transparent 11/05/24 0700   Line Status Flushed;Capped 11/05/24 0700   Dressing Status Clean;Dry 11/05/24 0700   Number of days: 7       Hemodialysis Arteriovenous Fistula 11/03/24 Left Upper arm (Active)   AV Fistula Present;Thrill;Bruit 11/05/24 0700   Site Assessment Clean;Dry;Intact 11/05/24 0700   Dressing Status Clean;Dry 11/05/24 0700   Number of days: 2       Code Status:  Full Code    I spent 60 minutes in the professional and overall care of this patient.        Betsy Garcias MD

## 2024-11-06 PROBLEM — I35.0 AORTIC STENOSIS: Status: RESOLVED | Noted: 2024-01-01 | Resolved: 2024-01-01

## 2024-11-06 PROBLEM — I35.0 SEVERE AORTIC STENOSIS: Status: RESOLVED | Noted: 2024-01-01 | Resolved: 2024-01-01

## 2024-11-06 PROBLEM — I44.2 COMPLETE HEART BLOCK: Status: ACTIVE | Noted: 2024-01-01

## 2024-11-06 LAB
ABO GROUP (TYPE) IN BLOOD: NORMAL
ALBUMIN SERPL BCP-MCNC: 2.7 G/DL (ref 3.4–5)
ALBUMIN SERPL BCP-MCNC: 3.4 G/DL (ref 3.4–5)
ANA SER QL HEP2 SUBST: NEGATIVE
ANION GAP BLDA CALCULATED.4IONS-SCNC: 12 MMO/L (ref 10–25)
ANION GAP BLDA CALCULATED.4IONS-SCNC: 13 MMO/L (ref 10–25)
ANION GAP BLDA CALCULATED.4IONS-SCNC: 14 MMO/L (ref 10–25)
ANION GAP BLDA CALCULATED.4IONS-SCNC: 16 MMO/L (ref 10–25)
ANION GAP BLDA CALCULATED.4IONS-SCNC: 17 MMO/L (ref 10–25)
ANION GAP BLDA CALCULATED.4IONS-SCNC: 19 MMO/L (ref 10–25)
ANION GAP BLDA CALCULATED.4IONS-SCNC: 21 MMO/L (ref 10–25)
ANION GAP SERPL CALC-SCNC: 17 MMOL/L (ref 10–20)
ANION GAP SERPL CALC-SCNC: 18 MMOL/L (ref 10–20)
ANION GAP SERPL CALC-SCNC: 26 MMOL/L (ref 10–20)
ANTIBODY SCREEN: NORMAL
AORTIC VALVE PEAK VELOCITY: 2.7 M/S
APTT PPP: 29 SECONDS (ref 27–38)
AV PEAK GRADIENT: 29 MMHG
AVA (PEAK VEL): 1.47 CM2
BASE EXCESS BLDA CALC-SCNC: -1.1 MMOL/L (ref -2–3)
BASE EXCESS BLDA CALC-SCNC: -4.7 MMOL/L (ref -2–3)
BASE EXCESS BLDA CALC-SCNC: -4.7 MMOL/L (ref -2–3)
BASE EXCESS BLDA CALC-SCNC: -5 MMOL/L (ref -2–3)
BASE EXCESS BLDA CALC-SCNC: -5.3 MMOL/L (ref -2–3)
BASE EXCESS BLDA CALC-SCNC: -8.1 MMOL/L (ref -2–3)
BASE EXCESS BLDA CALC-SCNC: -8.4 MMOL/L (ref -2–3)
BASOPHILS # BLD AUTO: 0.01 X10*3/UL (ref 0–0.1)
BASOPHILS # BLD AUTO: 0.04 X10*3/UL (ref 0–0.1)
BASOPHILS NFR BLD AUTO: 0.1 %
BASOPHILS NFR BLD AUTO: 0.2 %
BODY TEMPERATURE: 37 DEGREES CELSIUS
BUN SERPL-MCNC: 43 MG/DL (ref 6–23)
BUN SERPL-MCNC: 46 MG/DL (ref 6–23)
BUN SERPL-MCNC: 51 MG/DL (ref 6–23)
CA-I BLDA-SCNC: 0.95 MMOL/L (ref 1.1–1.33)
CA-I BLDA-SCNC: 1.03 MMOL/L (ref 1.1–1.33)
CA-I BLDA-SCNC: 1.06 MMOL/L (ref 1.1–1.33)
CA-I BLDA-SCNC: 1.07 MMOL/L (ref 1.1–1.33)
CA-I BLDA-SCNC: 1.07 MMOL/L (ref 1.1–1.33)
CA-I BLDA-SCNC: 1.11 MMOL/L (ref 1.1–1.33)
CA-I BLDA-SCNC: 1.32 MMOL/L (ref 1.1–1.33)
CALCIUM SERPL-MCNC: 6.7 MG/DL (ref 8.6–10.6)
CALCIUM SERPL-MCNC: 8.3 MG/DL (ref 8.6–10.6)
CALCIUM SERPL-MCNC: 8.4 MG/DL (ref 8.6–10.6)
CHLORIDE BLDA-SCNC: 100 MMOL/L (ref 98–107)
CHLORIDE BLDA-SCNC: 107 MMOL/L (ref 98–107)
CHLORIDE BLDA-SCNC: 99 MMOL/L (ref 98–107)
CHLORIDE BLDA-SCNC: 99 MMOL/L (ref 98–107)
CHLORIDE SERPL-SCNC: 106 MMOL/L (ref 98–107)
CHLORIDE SERPL-SCNC: 96 MMOL/L (ref 98–107)
CHLORIDE SERPL-SCNC: 99 MMOL/L (ref 98–107)
CO2 SERPL-SCNC: 20 MMOL/L (ref 21–32)
CO2 SERPL-SCNC: 21 MMOL/L (ref 21–32)
CO2 SERPL-SCNC: 29 MMOL/L (ref 21–32)
CREAT SERPL-MCNC: 4.81 MG/DL (ref 0.5–1.05)
CREAT SERPL-MCNC: 5.42 MG/DL (ref 0.5–1.05)
CREAT SERPL-MCNC: 5.96 MG/DL (ref 0.5–1.05)
EGFRCR SERPLBLD CKD-EPI 2021: 7 ML/MIN/1.73M*2
EGFRCR SERPLBLD CKD-EPI 2021: 8 ML/MIN/1.73M*2
EGFRCR SERPLBLD CKD-EPI 2021: 9 ML/MIN/1.73M*2
EJECTION FRACTION: 55 %
EOSINOPHIL # BLD AUTO: 0.09 X10*3/UL (ref 0–0.7)
EOSINOPHIL # BLD AUTO: 0.12 X10*3/UL (ref 0–0.7)
EOSINOPHIL NFR BLD AUTO: 0.4 %
EOSINOPHIL NFR BLD AUTO: 1.2 %
ERYTHROCYTE [DISTWIDTH] IN BLOOD BY AUTOMATED COUNT: 18.5 % (ref 11.5–14.5)
ERYTHROCYTE [DISTWIDTH] IN BLOOD BY AUTOMATED COUNT: 18.7 % (ref 11.5–14.5)
ERYTHROCYTE [DISTWIDTH] IN BLOOD BY AUTOMATED COUNT: 18.7 % (ref 11.5–14.5)
GLUCOSE BLD MANUAL STRIP-MCNC: 147 MG/DL (ref 74–99)
GLUCOSE BLD MANUAL STRIP-MCNC: 160 MG/DL (ref 74–99)
GLUCOSE BLD MANUAL STRIP-MCNC: 160 MG/DL (ref 74–99)
GLUCOSE BLD MANUAL STRIP-MCNC: 166 MG/DL (ref 74–99)
GLUCOSE BLD MANUAL STRIP-MCNC: 170 MG/DL (ref 74–99)
GLUCOSE BLD MANUAL STRIP-MCNC: 184 MG/DL (ref 74–99)
GLUCOSE BLD MANUAL STRIP-MCNC: 219 MG/DL (ref 74–99)
GLUCOSE BLDA-MCNC: 127 MG/DL (ref 74–99)
GLUCOSE BLDA-MCNC: 140 MG/DL (ref 74–99)
GLUCOSE BLDA-MCNC: 176 MG/DL (ref 74–99)
GLUCOSE BLDA-MCNC: 184 MG/DL (ref 74–99)
GLUCOSE BLDA-MCNC: 232 MG/DL (ref 74–99)
GLUCOSE BLDA-MCNC: 254 MG/DL (ref 74–99)
GLUCOSE BLDA-MCNC: 386 MG/DL (ref 74–99)
GLUCOSE SERPL-MCNC: 147 MG/DL (ref 74–99)
GLUCOSE SERPL-MCNC: 176 MG/DL (ref 74–99)
GLUCOSE SERPL-MCNC: 240 MG/DL (ref 74–99)
HCO3 BLDA-SCNC: 17.8 MMOL/L (ref 22–26)
HCO3 BLDA-SCNC: 17.9 MMOL/L (ref 22–26)
HCO3 BLDA-SCNC: 19.7 MMOL/L (ref 22–26)
HCO3 BLDA-SCNC: 20.4 MMOL/L (ref 22–26)
HCO3 BLDA-SCNC: 20.6 MMOL/L (ref 22–26)
HCO3 BLDA-SCNC: 21.6 MMOL/L (ref 22–26)
HCO3 BLDA-SCNC: 23.5 MMOL/L (ref 22–26)
HCT VFR BLD AUTO: 27.9 % (ref 36–46)
HCT VFR BLD AUTO: 30.8 % (ref 36–46)
HCT VFR BLD AUTO: 33 % (ref 36–46)
HCT VFR BLD EST: 27 % (ref 36–46)
HCT VFR BLD EST: 29 % (ref 36–46)
HCT VFR BLD EST: 30 % (ref 36–46)
HCT VFR BLD EST: 31 % (ref 36–46)
HCT VFR BLD EST: 32 % (ref 36–46)
HGB BLD-MCNC: 10 G/DL (ref 12–16)
HGB BLD-MCNC: 8.7 G/DL (ref 12–16)
HGB BLD-MCNC: 9.7 G/DL (ref 12–16)
HGB BLDA-MCNC: 10.2 G/DL (ref 12–16)
HGB BLDA-MCNC: 10.6 G/DL (ref 12–16)
HGB BLDA-MCNC: 9 G/DL (ref 12–16)
HGB BLDA-MCNC: 9.8 G/DL (ref 12–16)
HGB BLDA-MCNC: 9.9 G/DL (ref 12–16)
HIV 1 & 2 AB SERPL IA.RAPID: NEGATIVE
IMM GRANULOCYTES # BLD AUTO: 0.09 X10*3/UL (ref 0–0.7)
IMM GRANULOCYTES # BLD AUTO: 0.59 X10*3/UL (ref 0–0.7)
IMM GRANULOCYTES NFR BLD AUTO: 0.9 % (ref 0–0.9)
IMM GRANULOCYTES NFR BLD AUTO: 2.8 % (ref 0–0.9)
INHALED O2 CONCENTRATION: 100 %
INHALED O2 CONCENTRATION: 50 %
INHALED O2 CONCENTRATION: 50 %
INHALED O2 CONCENTRATION: 60 %
INR PPP: 1.1 (ref 0.9–1.1)
LACTATE BLDA-SCNC: 1.5 MMOL/L (ref 0.4–2)
LACTATE BLDA-SCNC: 2.9 MMOL/L (ref 0.4–2)
LACTATE BLDA-SCNC: 4.1 MMOL/L (ref 0.4–2)
LACTATE BLDA-SCNC: 4.3 MMOL/L (ref 0.4–2)
LACTATE BLDA-SCNC: 5 MMOL/L (ref 0.4–2)
LACTATE BLDA-SCNC: 6.1 MMOL/L (ref 0.4–2)
LACTATE BLDA-SCNC: 6.9 MMOL/L (ref 0.4–2)
LACTATE BLDV-SCNC: 8.3 MMOL/L (ref 0.4–2)
LACTATE SERPL-SCNC: 1.7 MMOL/L (ref 0.4–2)
LACTATE SERPL-SCNC: 5.2 MMOL/L (ref 0.4–2)
LACTATE SERPL-SCNC: 9.2 MMOL/L (ref 0.4–2)
LEFT VENTRICULAR OUTFLOW TRACT DIAMETER: 1.7 CM
LYMPHOCYTES # BLD AUTO: 0.71 X10*3/UL (ref 1.2–4.8)
LYMPHOCYTES # BLD AUTO: 0.71 X10*3/UL (ref 1.2–4.8)
LYMPHOCYTES NFR BLD AUTO: 3.4 %
LYMPHOCYTES NFR BLD AUTO: 7.4 %
MAGNESIUM SERPL-MCNC: 1.78 MG/DL (ref 1.6–2.4)
MAGNESIUM SERPL-MCNC: 2.24 MG/DL (ref 1.6–2.4)
MCH RBC QN AUTO: 31.8 PG (ref 26–34)
MCH RBC QN AUTO: 32.2 PG (ref 26–34)
MCH RBC QN AUTO: 32.3 PG (ref 26–34)
MCHC RBC AUTO-ENTMCNC: 30.3 G/DL (ref 32–36)
MCHC RBC AUTO-ENTMCNC: 31.2 G/DL (ref 32–36)
MCHC RBC AUTO-ENTMCNC: 31.5 G/DL (ref 32–36)
MCV RBC AUTO: 102 FL (ref 80–100)
MCV RBC AUTO: 103 FL (ref 80–100)
MCV RBC AUTO: 106 FL (ref 80–100)
MONOCYTES # BLD AUTO: 0.79 X10*3/UL (ref 0.1–1)
MONOCYTES # BLD AUTO: 1.11 X10*3/UL (ref 0.1–1)
MONOCYTES NFR BLD AUTO: 5.3 %
MONOCYTES NFR BLD AUTO: 8.2 %
NEUTROPHILS # BLD AUTO: 18.45 X10*3/UL (ref 1.2–7.7)
NEUTROPHILS # BLD AUTO: 7.9 X10*3/UL (ref 1.2–7.7)
NEUTROPHILS NFR BLD AUTO: 82.2 %
NEUTROPHILS NFR BLD AUTO: 87.9 %
NRBC BLD-RTO: 0 /100 WBCS (ref 0–0)
NRBC BLD-RTO: 0.2 /100 WBCS (ref 0–0)
NRBC BLD-RTO: 0.3 /100 WBCS (ref 0–0)
OXYHGB MFR BLDA: 94.2 % (ref 94–98)
OXYHGB MFR BLDA: 95.8 % (ref 94–98)
OXYHGB MFR BLDA: 96.5 % (ref 94–98)
OXYHGB MFR BLDA: 96.9 % (ref 94–98)
OXYHGB MFR BLDA: 97.4 % (ref 94–98)
PCO2 BLDA: 34 MM HG (ref 38–42)
PCO2 BLDA: 37 MM HG (ref 38–42)
PCO2 BLDA: 37 MM HG (ref 38–42)
PCO2 BLDA: 38 MM HG (ref 38–42)
PCO2 BLDA: 39 MM HG (ref 38–42)
PCO2 BLDA: 41 MM HG (ref 38–42)
PCO2 BLDA: 44 MM HG (ref 38–42)
PH BLDA: 7.27 PH (ref 7.38–7.42)
PH BLDA: 7.29 PH (ref 7.38–7.42)
PH BLDA: 7.3 PH (ref 7.38–7.42)
PH BLDA: 7.31 PH (ref 7.38–7.42)
PH BLDA: 7.35 PH (ref 7.38–7.42)
PH BLDA: 7.37 PH (ref 7.38–7.42)
PH BLDA: 7.4 PH (ref 7.38–7.42)
PHOSPHATE SERPL-MCNC: 3.7 MG/DL (ref 2.5–4.9)
PHOSPHATE SERPL-MCNC: 4.4 MG/DL (ref 2.5–4.9)
PLATELET # BLD AUTO: 72 X10*3/UL (ref 150–450)
PLATELET # BLD AUTO: 75 X10*3/UL (ref 150–450)
PLATELET # BLD AUTO: 91 X10*3/UL (ref 150–450)
PO2 BLDA: 118 MM HG (ref 85–95)
PO2 BLDA: 158 MM HG (ref 85–95)
PO2 BLDA: 177 MM HG (ref 85–95)
PO2 BLDA: 271 MM HG (ref 85–95)
PO2 BLDA: 318 MM HG (ref 85–95)
PO2 BLDA: 82 MM HG (ref 85–95)
PO2 BLDA: 95 MM HG (ref 85–95)
POTASSIUM BLDA-SCNC: 4.6 MMOL/L (ref 3.5–5.3)
POTASSIUM BLDA-SCNC: 4.8 MMOL/L (ref 3.5–5.3)
POTASSIUM BLDA-SCNC: 5.5 MMOL/L (ref 3.5–5.3)
POTASSIUM BLDA-SCNC: 5.6 MMOL/L (ref 3.5–5.3)
POTASSIUM BLDA-SCNC: 5.7 MMOL/L (ref 3.5–5.3)
POTASSIUM BLDA-SCNC: 5.8 MMOL/L (ref 3.5–5.3)
POTASSIUM BLDA-SCNC: 6.1 MMOL/L (ref 3.5–5.3)
POTASSIUM SERPL-SCNC: 4.7 MMOL/L (ref 3.5–5.3)
POTASSIUM SERPL-SCNC: 5.4 MMOL/L (ref 3.5–5.3)
POTASSIUM SERPL-SCNC: 6 MMOL/L (ref 3.5–5.3)
PROTHROMBIN TIME: 12.9 SECONDS (ref 9.8–12.8)
RBC # BLD AUTO: 2.74 X10*6/UL (ref 4–5.2)
RBC # BLD AUTO: 3 X10*6/UL (ref 4–5.2)
RBC # BLD AUTO: 3.11 X10*6/UL (ref 4–5.2)
RH FACTOR (ANTIGEN D): NORMAL
SAO2 % BLDA: 100 % (ref 94–100)
SAO2 % BLDA: 97 % (ref 94–100)
SAO2 % BLDA: 98 % (ref 94–100)
SAO2 % BLDA: 99 % (ref 94–100)
SAO2 % BLDA: 99 % (ref 94–100)
SODIUM BLDA-SCNC: 129 MMOL/L (ref 136–145)
SODIUM BLDA-SCNC: 130 MMOL/L (ref 136–145)
SODIUM BLDA-SCNC: 131 MMOL/L (ref 136–145)
SODIUM BLDA-SCNC: 132 MMOL/L (ref 136–145)
SODIUM BLDA-SCNC: 132 MMOL/L (ref 136–145)
SODIUM BLDA-SCNC: 133 MMOL/L (ref 136–145)
SODIUM BLDA-SCNC: 134 MMOL/L (ref 136–145)
SODIUM SERPL-SCNC: 137 MMOL/L (ref 136–145)
SODIUM SERPL-SCNC: 139 MMOL/L (ref 136–145)
SODIUM SERPL-SCNC: 140 MMOL/L (ref 136–145)
VANCOMYCIN SERPL-MCNC: 11.3 UG/ML (ref 5–20)
WBC # BLD AUTO: 21 X10*3/UL (ref 4.4–11.3)
WBC # BLD AUTO: 22.5 X10*3/UL (ref 4.4–11.3)
WBC # BLD AUTO: 9.6 X10*3/UL (ref 4.4–11.3)

## 2024-11-06 PROCEDURE — 33210 INSERT ELECTRD/PM CATH SNGL: CPT | Performed by: INTERNAL MEDICINE

## 2024-11-06 PROCEDURE — 86022 PLATELET ANTIBODIES: CPT

## 2024-11-06 PROCEDURE — 83605 ASSAY OF LACTIC ACID: CPT | Performed by: STUDENT IN AN ORGANIZED HEALTH CARE EDUCATION/TRAINING PROGRAM

## 2024-11-06 PROCEDURE — 2500000005 HC RX 250 GENERAL PHARMACY W/O HCPCS

## 2024-11-06 PROCEDURE — 2500000004 HC RX 250 GENERAL PHARMACY W/ HCPCS (ALT 636 FOR OP/ED)

## 2024-11-06 PROCEDURE — 85610 PROTHROMBIN TIME: CPT

## 2024-11-06 PROCEDURE — 86901 BLOOD TYPING SEROLOGIC RH(D): CPT

## 2024-11-06 PROCEDURE — 2500000001 HC RX 250 WO HCPCS SELF ADMINISTERED DRUGS (ALT 637 FOR MEDICARE OP)

## 2024-11-06 PROCEDURE — 2720000007 HC OR 272 NO HCPCS: Performed by: INTERNAL MEDICINE

## 2024-11-06 PROCEDURE — 83605 ASSAY OF LACTIC ACID: CPT

## 2024-11-06 PROCEDURE — 71045 X-RAY EXAM CHEST 1 VIEW: CPT | Performed by: RADIOLOGY

## 2024-11-06 PROCEDURE — 37799 UNLISTED PX VASCULAR SURGERY: CPT

## 2024-11-06 PROCEDURE — 84132 ASSAY OF SERUM POTASSIUM: CPT | Performed by: STUDENT IN AN ORGANIZED HEALTH CARE EDUCATION/TRAINING PROGRAM

## 2024-11-06 PROCEDURE — 3E033XZ INTRODUCTION OF VASOPRESSOR INTO PERIPHERAL VEIN, PERCUTANEOUS APPROACH: ICD-10-PCS | Performed by: INTERNAL MEDICINE

## 2024-11-06 PROCEDURE — 85025 COMPLETE CBC W/AUTO DIFF WBC: CPT

## 2024-11-06 PROCEDURE — 5A1223Z PERFORMANCE OF CARDIAC PACING, CONTINUOUS: ICD-10-PCS | Performed by: INTERNAL MEDICINE

## 2024-11-06 PROCEDURE — 2500000004 HC RX 250 GENERAL PHARMACY W/ HCPCS (ALT 636 FOR OP/ED): Performed by: INTERNAL MEDICINE

## 2024-11-06 PROCEDURE — 99232 SBSQ HOSP IP/OBS MODERATE 35: CPT | Performed by: NURSE PRACTITIONER

## 2024-11-06 PROCEDURE — C1756 CATH, PACING, TRANSESOPH: HCPCS | Performed by: INTERNAL MEDICINE

## 2024-11-06 PROCEDURE — 2020000001 HC ICU ROOM DAILY

## 2024-11-06 PROCEDURE — 80202 ASSAY OF VANCOMYCIN: CPT

## 2024-11-06 PROCEDURE — 2500000001 HC RX 250 WO HCPCS SELF ADMINISTERED DRUGS (ALT 637 FOR MEDICARE OP): Performed by: NURSE PRACTITIONER

## 2024-11-06 PROCEDURE — 99233 SBSQ HOSP IP/OBS HIGH 50: CPT | Performed by: INTERNAL MEDICINE

## 2024-11-06 PROCEDURE — 2500000002 HC RX 250 W HCPCS SELF ADMINISTERED DRUGS (ALT 637 FOR MEDICARE OP, ALT 636 FOR OP/ED)

## 2024-11-06 PROCEDURE — 86923 COMPATIBILITY TEST ELECTRIC: CPT

## 2024-11-06 PROCEDURE — 82435 ASSAY OF BLOOD CHLORIDE: CPT

## 2024-11-06 PROCEDURE — 99231 SBSQ HOSP IP/OBS SF/LOW 25: CPT | Performed by: INTERNAL MEDICINE

## 2024-11-06 PROCEDURE — 2500000004 HC RX 250 GENERAL PHARMACY W/ HCPCS (ALT 636 FOR OP/ED): Performed by: NURSE PRACTITIONER

## 2024-11-06 PROCEDURE — 83735 ASSAY OF MAGNESIUM: CPT

## 2024-11-06 PROCEDURE — 0BH17EZ INSERTION OF ENDOTRACHEAL AIRWAY INTO TRACHEA, VIA NATURAL OR ARTIFICIAL OPENING: ICD-10-PCS | Performed by: INTERNAL MEDICINE

## 2024-11-06 PROCEDURE — C1894 INTRO/SHEATH, NON-LASER: HCPCS | Performed by: INTERNAL MEDICINE

## 2024-11-06 PROCEDURE — 5A1945Z RESPIRATORY VENTILATION, 24-96 CONSECUTIVE HOURS: ICD-10-PCS | Performed by: INTERNAL MEDICINE

## 2024-11-06 PROCEDURE — 31500 INSERT EMERGENCY AIRWAY: CPT | Mod: GC | Performed by: STUDENT IN AN ORGANIZED HEALTH CARE EDUCATION/TRAINING PROGRAM

## 2024-11-06 PROCEDURE — 94002 VENT MGMT INPAT INIT DAY: CPT

## 2024-11-06 PROCEDURE — 85027 COMPLETE CBC AUTOMATED: CPT | Performed by: STUDENT IN AN ORGANIZED HEALTH CARE EDUCATION/TRAINING PROGRAM

## 2024-11-06 PROCEDURE — 84132 ASSAY OF SERUM POTASSIUM: CPT

## 2024-11-06 PROCEDURE — 5A12012 PERFORMANCE OF CARDIAC OUTPUT, SINGLE, MANUAL: ICD-10-PCS | Performed by: INTERNAL MEDICINE

## 2024-11-06 PROCEDURE — 2500000005 HC RX 250 GENERAL PHARMACY W/O HCPCS: Performed by: STUDENT IN AN ORGANIZED HEALTH CARE EDUCATION/TRAINING PROGRAM

## 2024-11-06 PROCEDURE — C1769 GUIDE WIRE: HCPCS | Performed by: INTERNAL MEDICINE

## 2024-11-06 PROCEDURE — 82947 ASSAY GLUCOSE BLOOD QUANT: CPT

## 2024-11-06 PROCEDURE — 93010 ELECTROCARDIOGRAM REPORT: CPT | Performed by: INTERNAL MEDICINE

## 2024-11-06 PROCEDURE — 76937 US GUIDE VASCULAR ACCESS: CPT | Performed by: INTERNAL MEDICINE

## 2024-11-06 PROCEDURE — 80069 RENAL FUNCTION PANEL: CPT

## 2024-11-06 RX ORDER — POLYETHYLENE GLYCOL 3350 17 G/17G
17 POWDER, FOR SOLUTION ORAL DAILY
Status: DISCONTINUED | OUTPATIENT
Start: 2024-11-06 | End: 2024-11-10 | Stop reason: HOSPADM

## 2024-11-06 RX ORDER — FENTANYL CITRATE 50 UG/ML
INJECTION, SOLUTION INTRAMUSCULAR; INTRAVENOUS
Status: COMPLETED
Start: 2024-11-06 | End: 2024-11-06

## 2024-11-06 RX ORDER — DEXTROSE 50 % IN WATER (D50W) INTRAVENOUS SYRINGE
25 ONCE
Status: COMPLETED | OUTPATIENT
Start: 2024-11-06 | End: 2024-11-06

## 2024-11-06 RX ORDER — ETOMIDATE 2 MG/ML
INJECTION INTRAVENOUS
Status: COMPLETED
Start: 2024-11-06 | End: 2024-11-06

## 2024-11-06 RX ORDER — FENTANYL CITRATE-0.9 % NACL/PF 10 MCG/ML
25-200 PLASTIC BAG, INJECTION (ML) INTRAVENOUS CONTINUOUS
Status: DISCONTINUED | OUTPATIENT
Start: 2024-11-06 | End: 2024-11-10 | Stop reason: HOSPADM

## 2024-11-06 RX ORDER — CALCIUM GLUCONATE 20 MG/ML
2 INJECTION, SOLUTION INTRAVENOUS ONCE
Status: COMPLETED | OUTPATIENT
Start: 2024-11-06 | End: 2024-11-06

## 2024-11-06 RX ORDER — VASOPRESSIN 20 U/ML
INJECTION PARENTERAL
Status: DISPENSED
Start: 2024-11-06 | End: 2024-11-07

## 2024-11-06 RX ORDER — DEXTROSE MONOHYDRATE 100 MG/ML
50 INJECTION, SOLUTION INTRAVENOUS CONTINUOUS
Status: DISCONTINUED | OUTPATIENT
Start: 2024-11-06 | End: 2024-11-07

## 2024-11-06 RX ORDER — SODIUM BICARBONATE 1 MEQ/ML
75 SYRINGE (ML) INTRAVENOUS ONCE
Status: DISCONTINUED | OUTPATIENT
Start: 2024-11-06 | End: 2024-11-06

## 2024-11-06 RX ORDER — NOREPINEPHRINE BITARTRATE/D5W 8 MG/250ML
0-.2 PLASTIC BAG, INJECTION (ML) INTRAVENOUS CONTINUOUS
Status: DISCONTINUED | OUTPATIENT
Start: 2024-11-06 | End: 2024-11-08

## 2024-11-06 RX ORDER — DEXTROSE MONOHYDRATE 100 MG/ML
50 INJECTION, SOLUTION INTRAVENOUS CONTINUOUS
Status: DISCONTINUED | OUTPATIENT
Start: 2024-11-06 | End: 2024-11-06

## 2024-11-06 RX ORDER — HYDROMORPHONE HYDROCHLORIDE 0.2 MG/ML
0.2 INJECTION INTRAMUSCULAR; INTRAVENOUS; SUBCUTANEOUS
Status: DISCONTINUED | OUTPATIENT
Start: 2024-11-06 | End: 2024-11-08

## 2024-11-06 RX ORDER — ETOMIDATE 2 MG/ML
INJECTION INTRAVENOUS
Status: DISPENSED
Start: 2024-11-06 | End: 2024-11-07

## 2024-11-06 RX ORDER — ONDANSETRON HYDROCHLORIDE 2 MG/ML
INJECTION, SOLUTION INTRAVENOUS AS NEEDED
Status: DISCONTINUED | OUTPATIENT
Start: 2024-11-06 | End: 2024-11-06 | Stop reason: HOSPADM

## 2024-11-06 RX ORDER — VANCOMYCIN/0.9 % SOD CHLORIDE 1.5G/250ML
1500 PLASTIC BAG, INJECTION (ML) INTRAVENOUS ONCE
Status: COMPLETED | OUTPATIENT
Start: 2024-11-06 | End: 2024-11-07

## 2024-11-06 RX ORDER — MEROPENEM 500 MG/1
500 INJECTION, POWDER, FOR SOLUTION INTRAVENOUS EVERY 24 HOURS
Status: DISCONTINUED | OUTPATIENT
Start: 2024-11-06 | End: 2024-11-08

## 2024-11-06 RX ORDER — VANCOMYCIN HYDROCHLORIDE 1 G/200ML
1000 INJECTION, SOLUTION INTRAVENOUS ONCE
Status: DISCONTINUED | OUTPATIENT
Start: 2024-11-06 | End: 2024-11-06

## 2024-11-06 RX ORDER — ROCURONIUM BROMIDE 10 MG/ML
100 INJECTION, SOLUTION INTRAVENOUS ONCE
Status: COMPLETED | OUTPATIENT
Start: 2024-11-06 | End: 2024-11-06

## 2024-11-06 RX ORDER — CALCIUM GLUCONATE 20 MG/ML
INJECTION, SOLUTION INTRAVENOUS
Status: DISPENSED
Start: 2024-11-06 | End: 2024-11-07

## 2024-11-06 RX ORDER — ROCURONIUM BROMIDE 10 MG/ML
INJECTION, SOLUTION INTRAVENOUS
Status: COMPLETED
Start: 2024-11-06 | End: 2024-11-06

## 2024-11-06 RX ORDER — NOREPINEPHRINE BITARTRATE/D5W 8 MG/250ML
PLASTIC BAG, INJECTION (ML) INTRAVENOUS
Status: COMPLETED
Start: 2024-11-06 | End: 2024-11-06

## 2024-11-06 RX ORDER — DOPAMINE HYDROCHLORIDE 160 MG/100ML
0-10 INJECTION, SOLUTION INTRAVENOUS CONTINUOUS
Status: DISCONTINUED | OUTPATIENT
Start: 2024-11-06 | End: 2024-11-06

## 2024-11-06 RX ORDER — FENTANYL CITRATE 50 UG/ML
50 INJECTION, SOLUTION INTRAMUSCULAR; INTRAVENOUS ONCE
Status: COMPLETED | OUTPATIENT
Start: 2024-11-06 | End: 2024-11-06

## 2024-11-06 RX ORDER — MIDAZOLAM HYDROCHLORIDE 1 MG/ML
0-20 INJECTION, SOLUTION INTRAVENOUS CONTINUOUS
Status: DISCONTINUED | OUTPATIENT
Start: 2024-11-06 | End: 2024-11-10 | Stop reason: HOSPADM

## 2024-11-06 RX ORDER — FENTANYL CITRATE-0.9 % NACL/PF 10 MCG/ML
PLASTIC BAG, INJECTION (ML) INTRAVENOUS
Status: COMPLETED
Start: 2024-11-06 | End: 2024-11-06

## 2024-11-06 RX ORDER — DOPAMINE HYDROCHLORIDE 160 MG/100ML
0-10 INJECTION, SOLUTION INTRAVENOUS CONTINUOUS
Status: DISCONTINUED | OUTPATIENT
Start: 2024-11-06 | End: 2024-11-07

## 2024-11-06 RX ORDER — MEROPENEM AND SODIUM CHLORIDE 1 G/50ML
1000 INJECTION, SOLUTION INTRAVENOUS EVERY 8 HOURS
Status: DISCONTINUED | OUTPATIENT
Start: 2024-11-06 | End: 2024-11-06

## 2024-11-06 RX ORDER — LIDOCAINE HYDROCHLORIDE 10 MG/ML
INJECTION, SOLUTION EPIDURAL; INFILTRATION; INTRACAUDAL; PERINEURAL AS NEEDED
Status: DISCONTINUED | OUTPATIENT
Start: 2024-11-06 | End: 2024-11-06 | Stop reason: HOSPADM

## 2024-11-06 RX ORDER — ETOMIDATE 2 MG/ML
20 INJECTION INTRAVENOUS ONCE
Status: COMPLETED | OUTPATIENT
Start: 2024-11-06 | End: 2024-11-06

## 2024-11-06 RX ORDER — NOREPINEPHRINE BITARTRATE/D5W 8 MG/250ML
0-.2 PLASTIC BAG, INJECTION (ML) INTRAVENOUS CONTINUOUS
Status: DISCONTINUED | OUTPATIENT
Start: 2024-11-06 | End: 2024-11-06

## 2024-11-06 ASSESSMENT — PAIN - FUNCTIONAL ASSESSMENT
PAIN_FUNCTIONAL_ASSESSMENT: 0-10
PAIN_FUNCTIONAL_ASSESSMENT: CPOT (CRITICAL CARE PAIN OBSERVATION TOOL)
PAIN_FUNCTIONAL_ASSESSMENT: 0-10

## 2024-11-06 ASSESSMENT — COGNITIVE AND FUNCTIONAL STATUS - GENERAL
TURNING FROM BACK TO SIDE WHILE IN FLAT BAD: A LITTLE
DRESSING REGULAR UPPER BODY CLOTHING: A LITTLE
HELP NEEDED FOR BATHING: A LITTLE
WALKING IN HOSPITAL ROOM: A LITTLE
MOVING TO AND FROM BED TO CHAIR: A LOT
DRESSING REGULAR LOWER BODY CLOTHING: A LITTLE
MOBILITY SCORE: 16
TOILETING: A LITTLE
STANDING UP FROM CHAIR USING ARMS: A LOT
DAILY ACTIVITIY SCORE: 20
CLIMB 3 TO 5 STEPS WITH RAILING: A LOT

## 2024-11-06 ASSESSMENT — PAIN SCALES - GENERAL
PAINLEVEL_OUTOF10: 0 - NO PAIN
PAINLEVEL_OUTOF10: 9
PAINLEVEL_OUTOF10: 0 - NO PAIN
PAINLEVEL_OUTOF10: 7
PAINLEVEL_OUTOF10: 0 - NO PAIN
PAINLEVEL_OUTOF10: 0 - NO PAIN

## 2024-11-06 ASSESSMENT — PAIN DESCRIPTION - LOCATION: LOCATION: BACK

## 2024-11-06 ASSESSMENT — PAIN DESCRIPTION - DESCRIPTORS: DESCRIPTORS: DISCOMFORT

## 2024-11-06 NOTE — OP NOTE
Date of the Operation:2411/2024  Pt Name:Faviola Pleitez   MRN:[id number]   Pre Op D. - Severe aortic valve stenosis  Post Op D. - Severe aortic valve stenosis  Operation/Procedure:  1. - Pre-dilatation aortic Balloon plasty with a 21x45 mm True Balloon and second with 22mm.   2. - TAVR: Evolute FX29mm  Surgeon: PAUL Trinidad  Cardiologist: AVTAR Hart  Anesthesia Type: Conscious sedation and local 2% lidocaine  Complications: None  Estimated Blood loss: 150 cc  Operative indications: The patient was well diagnosed with severe aortic valve stenosis. The heart team recommended to perform a TAVR.   Operative Findings: Per fluoroscopy severe calcification of the aortic valve.  Operative procedure: The patient was placed on the surgical table in a supine position. Prepped and draped as usual.  2% lidocaine for all the percutaneous access.  TPMW through the IJ. Secondary access: L femoral artery with a 6Fr introducer sheath. Heparin is given for ACT>300s. Primary access on the R femoral artery was prepared with 2 perclose and a 14 Fr introducer sheath.  Pigtails are placed in the NCS and the LV Bassett. Pre implant hemodynamics are obtained. GW exchange to a Safari preformed wire. A pre-implant balloon-plasty with a 21 mm balloon after first implant valve looks severely constrained so it was recaptured and remounted. A second ballonplasrty with a 22 mm was also performed before our second attempt. The valve previously chosen and prepared is now pushed under fluoroscopy across the aortic arch and then across the valve. Rapid pacing, and the valve is deployed. Delivery system is removed. Post implants hemodynamics are obtained. Post implant surface echo is done showing minimal to no PVL. The procedure is considered successful. The Protamine is given. Perclose are tied down and an Angioseal is applied to the other FA. Pulses are checked. The patient has no new electrical disturbances so the TPMW is pulled out and  the patient is transferred for postoperative management.

## 2024-11-06 NOTE — PROGRESS NOTES
Structural Heart Progress Note    HPI   Faviola Pleitze is a 69 y.o. female with a PMH of ESRD on IHD on T/Th/S via left arm fistula, CAD, s/p PCI to LAD in 2015, s/p PCI to LM and mid and proximal LAD with double stenting 11/01/2024, lymphedema c/b recurrent cellulitis of LE, chronic hypoxemia (on 3L NC baseline), DM2, HLD, HTN, HFmrEF at 31%, afib on Eliquis, and Severe Aortic stenosis s/p TAVR with Evolut FX+ 29 mm 11/05.     Patient Active Problem List    Diagnosis Date Noted    S/P TAVR (transcatheter aortic valve replacement) 11/04/2024    Severe aortic stenosis 10/28/2024    Rhinovirus     Aortic stenosis 10/31/2024        LOS: 9 days     Objective     Vitals 24 hour ranges:  Temp:  [35.2 °C (95.4 °F)-36 °C (96.8 °F)] 35.8 °C (96.4 °F)  Heart Rate:  [60-86] 86  Resp:  [11-19] 15  BP: ()/(38-98) 97/41  SpO2:  [93 %-100 %] 97 %  Medical Gas Therapy: Supplemental oxygen  Medical Gas Delivery Method: Nasal cannula     Intake/Output last 3 Shifts:    Intake/Output Summary (Last 24 hours) at 11/6/2024 0840  Last data filed at 11/5/2024 1204  Gross per 24 hour   Intake --   Output 30 ml   Net -30 ml       LDA:  Peripheral IV 20 G Right Antecubital (Active)   Earliest Known Present: 10/28/24   Placed by External Staff?: Other hospital  Hand Hygiene Completed: Yes  Size (Gauge): 20 G  Orientation: Right  Location: Antecubital   Number of days: 9       Peripheral IV 10/29/24 22 G Right;Anterior Forearm (Active)   Placement Date/Time: 10/29/24 2045   Hand Hygiene Completed: Yes  Size (Gauge): 22 G  Orientation: Right;Anterior  Location: Forearm  Site Prep: Chlorhexidine   Placed by: Mary Carlos RN BSN [facility]  Insertion attempts: 1  Patient Tolerance: Tolerated well   Number of days: 7       Venous Sheath 11/05/24 1008 7 Fr. Left Femoral (Active)   Placement Date/Time: 11/05/24 1008   Sheath Size: 7 Fr.  Line Orientation: Left  Sheath Insertion Site: Femoral   Number of days: 0        Vent settings:       Lab  Results:  Recent Results (from the past 48 hours)   Heparin Assay, UFH    Collection Time: 11/04/24  9:29 AM   Result Value Ref Range    Heparin Unfractionated 0.5 See Comment Below for Therapeutic Ranges IU/mL   POCT GLUCOSE    Collection Time: 11/04/24 12:37 PM   Result Value Ref Range    POCT Glucose 85 74 - 99 mg/dL   Heparin Assay, UFH    Collection Time: 11/04/24  4:09 PM   Result Value Ref Range    Heparin Unfractionated 0.3 See Comment Below for Therapeutic Ranges IU/mL   POCT GLUCOSE    Collection Time: 11/04/24  5:35 PM   Result Value Ref Range    POCT Glucose 115 (H) 74 - 99 mg/dL   POCT GLUCOSE    Collection Time: 11/04/24  9:21 PM   Result Value Ref Range    POCT Glucose 167 (H) 74 - 99 mg/dL   CBC    Collection Time: 11/05/24  6:15 AM   Result Value Ref Range    WBC 8.7 4.4 - 11.3 x10*3/uL    nRBC 0.2 (H) 0.0 - 0.0 /100 WBCs    RBC 3.38 (L) 4.00 - 5.20 x10*6/uL    Hemoglobin 10.7 (L) 12.0 - 16.0 g/dL    Hematocrit 36.0 36.0 - 46.0 %     (H) 80 - 100 fL    MCH 31.7 26.0 - 34.0 pg    MCHC 29.7 (L) 32.0 - 36.0 g/dL    RDW 18.8 (H) 11.5 - 14.5 %    Platelets 84 (L) 150 - 450 x10*3/uL   Renal Function Panel    Collection Time: 11/05/24  6:15 AM   Result Value Ref Range    Glucose 217 (H) 74 - 99 mg/dL    Sodium 140 136 - 145 mmol/L    Potassium 4.5 3.5 - 5.3 mmol/L    Chloride 98 98 - 107 mmol/L    Bicarbonate 34 (H) 21 - 32 mmol/L    Anion Gap 13 10 - 20 mmol/L    Urea Nitrogen 34 (H) 6 - 23 mg/dL    Creatinine 4.13 (H) 0.50 - 1.05 mg/dL    eGFR 11 (L) >60 mL/min/1.73m*2    Calcium 8.4 (L) 8.6 - 10.6 mg/dL    Phosphorus 2.4 (L) 2.5 - 4.9 mg/dL    Albumin 3.6 3.4 - 5.0 g/dL   Magnesium    Collection Time: 11/05/24  6:15 AM   Result Value Ref Range    Magnesium 2.18 1.60 - 2.40 mg/dL   Coagulation Screen    Collection Time: 11/05/24  6:15 AM   Result Value Ref Range    Protime 11.2 9.8 - 12.8 seconds    INR 1.0 0.9 - 1.1    aPTT 47 (H) 27 - 38 seconds   Heparin Assay, UFH    Collection Time: 11/05/24   6:15 AM   Result Value Ref Range    Heparin Unfractionated 0.3 See Comment Below for Therapeutic Ranges IU/mL   POCT GLUCOSE    Collection Time: 11/05/24  7:33 AM   Result Value Ref Range    POCT Glucose 201 (H) 74 - 99 mg/dL   POCT GLUCOSE    Collection Time: 11/05/24 10:29 AM   Result Value Ref Range    POCT Glucose 168 (H) 74 - 99 mg/dL   Blood Gas Arterial Full Panel Unsolicited    Collection Time: 11/05/24 10:40 AM   Result Value Ref Range    POCT pH, Arterial 7.38 7.38 - 7.42 pH    POCT pCO2, Arterial 48 (H) 38 - 42 mm Hg    POCT pO2, Arterial 167 (H) 85 - 95 mm Hg    POCT SO2, Arterial 100 94 - 100 %    POCT Oxy Hemoglobin, Arterial 96.8 94.0 - 98.0 %    POCT Hematocrit Calculated, Arterial 34.0 (L) 36.0 - 46.0 %    POCT Sodium, Arterial 134 (L) 136 - 145 mmol/L    POCT Potassium, Arterial 4.9 3.5 - 5.3 mmol/L    POCT Chloride, Arterial 102 98 - 107 mmol/L    POCT Ionized Calcium, Arterial 1.04 (L) 1.10 - 1.33 mmol/L    POCT Glucose, Arterial 222 (H) 74 - 99 mg/dL    POCT Lactate, Arterial 1.5 0.4 - 2.0 mmol/L    POCT Base Excess, Arterial 2.6 -2.0 - 3.0 mmol/L    POCT HCO3 Calculated, Arterial 28.4 (H) 22.0 - 26.0 mmol/L    POCT Hemoglobin, Arterial 11.4 (L) 12.0 - 16.0 g/dL    POCT Anion Gap, Arterial 9 (L) 10 - 25 mmo/L    Patient Temperature 37.0 degrees Celsius   Magnesium    Collection Time: 11/05/24 12:43 PM   Result Value Ref Range    Magnesium 2.17 1.60 - 2.40 mg/dL   CBC and Auto Differential    Collection Time: 11/05/24 12:43 PM   Result Value Ref Range    WBC 10.5 4.4 - 11.3 x10*3/uL    nRBC 0.2 (H) 0.0 - 0.0 /100 WBCs    RBC 3.00 (L) 4.00 - 5.20 x10*6/uL    Hemoglobin 9.7 (L) 12.0 - 16.0 g/dL    Hematocrit 30.7 (L) 36.0 - 46.0 %     (H) 80 - 100 fL    MCH 32.3 26.0 - 34.0 pg    MCHC 31.6 (L) 32.0 - 36.0 g/dL    RDW 18.4 (H) 11.5 - 14.5 %    Platelets 75 (L) 150 - 450 x10*3/uL    Neutrophils % 83.5 40.0 - 80.0 %    Immature Granulocytes %, Automated 1.2 (H) 0.0 - 0.9 %    Lymphocytes % 7.5  13.0 - 44.0 %    Monocytes % 6.6 2.0 - 10.0 %    Eosinophils % 1.1 0.0 - 6.0 %    Basophils % 0.1 0.0 - 2.0 %    Neutrophils Absolute 8.78 (H) 1.20 - 7.70 x10*3/uL    Immature Granulocytes Absolute, Automated 0.13 0.00 - 0.70 x10*3/uL    Lymphocytes Absolute 0.79 (L) 1.20 - 4.80 x10*3/uL    Monocytes Absolute 0.69 0.10 - 1.00 x10*3/uL    Eosinophils Absolute 0.12 0.00 - 0.70 x10*3/uL    Basophils Absolute 0.01 0.00 - 0.10 x10*3/uL   Renal function panel    Collection Time: 11/05/24 12:43 PM   Result Value Ref Range    Glucose 199 (H) 74 - 99 mg/dL    Sodium 140 136 - 145 mmol/L    Potassium 4.7 3.5 - 5.3 mmol/L    Chloride 98 98 - 107 mmol/L    Bicarbonate 30 21 - 32 mmol/L    Anion Gap 17 10 - 20 mmol/L    Urea Nitrogen 36 (H) 6 - 23 mg/dL    Creatinine 4.50 (H) 0.50 - 1.05 mg/dL    eGFR 10 (L) >60 mL/min/1.73m*2    Calcium 7.7 (L) 8.6 - 10.6 mg/dL    Phosphorus 3.3 2.5 - 4.9 mg/dL    Albumin 3.2 (L) 3.4 - 5.0 g/dL   Coagulation Screen    Collection Time: 11/05/24 12:43 PM   Result Value Ref Range    Protime 14.5 (H) 9.8 - 12.8 seconds    INR 1.3 (H) 0.9 - 1.1    aPTT 28 27 - 38 seconds   Hemoglobin A1c    Collection Time: 11/05/24 12:43 PM   Result Value Ref Range    Hemoglobin A1C 7.3 (H) See comment %    Estimated Average Glucose 163 Not Established mg/dL   Iron and TIBC    Collection Time: 11/05/24 12:43 PM   Result Value Ref Range    Iron 44 35 - 150 ug/dL    UIBC 137 110 - 370 ug/dL    TIBC 181 (L) 240 - 445 ug/dL    % Saturation 24 (L) 25 - 45 %   Ferritin    Collection Time: 11/05/24 12:43 PM   Result Value Ref Range    Ferritin 1,321 (H) 8 - 150 ng/mL   Lipid Panel    Collection Time: 11/05/24 12:43 PM   Result Value Ref Range    Cholesterol 127 0 - 199 mg/dL    HDL-Cholesterol 62.5 mg/dL    Cholesterol/HDL Ratio 2.0     LDL Calculated 45 <=99 mg/dL    VLDL 19 0 - 40 mg/dL    Triglycerides 97 0 - 149 mg/dL    Non HDL Cholesterol 65 0 - 149 mg/dL   TSH with reflex to Free T4 if abnormal    Collection Time:  11/05/24 12:43 PM   Result Value Ref Range    Thyroid Stimulating Hormone 1.29 0.44 - 3.98 mIU/L   HIV 1/2 Antigen/Antibody Screen with Reflex to Confirmation    Collection Time: 11/05/24 12:43 PM   Result Value Ref Range    HIV 1/2 Antigen/Antibody Screen with Reflex to Confirmation Initially Reactive (A) Nonreactive   ECG 12 lead    Collection Time: 11/05/24 12:50 PM   Result Value Ref Range    Ventricular Rate 51 BPM    Atrial Rate 39 BPM    QRS Duration 122 ms    QT Interval 508 ms    QTC Calculation(Bazett) 468 ms    R Axis -13 degrees    T Axis 144 degrees    QRS Count 9 beats    Q Onset 209 ms    T Offset 463 ms    QTC Fredericia 481 ms   POCT GLUCOSE    Collection Time: 11/05/24  2:19 PM   Result Value Ref Range    POCT Glucose 181 (H) 74 - 99 mg/dL   POCT GLUCOSE    Collection Time: 11/05/24  4:16 PM   Result Value Ref Range    POCT Glucose 178 (H) 74 - 99 mg/dL   POCT GLUCOSE    Collection Time: 11/05/24  8:08 PM   Result Value Ref Range    POCT Glucose 203 (H) 74 - 99 mg/dL   Magnesium    Collection Time: 11/06/24  5:54 AM   Result Value Ref Range    Magnesium 2.24 1.60 - 2.40 mg/dL   Renal Function Panel    Collection Time: 11/06/24  5:54 AM   Result Value Ref Range    Glucose 147 (H) 74 - 99 mg/dL    Sodium 140 136 - 145 mmol/L    Potassium 5.4 (H) 3.5 - 5.3 mmol/L    Chloride 99 98 - 107 mmol/L    Bicarbonate 29 21 - 32 mmol/L    Anion Gap 17 10 - 20 mmol/L    Urea Nitrogen 46 (H) 6 - 23 mg/dL    Creatinine 5.42 (H) 0.50 - 1.05 mg/dL    eGFR 8 (L) >60 mL/min/1.73m*2    Calcium 8.3 (L) 8.6 - 10.6 mg/dL    Phosphorus 4.4 2.5 - 4.9 mg/dL    Albumin 3.4 3.4 - 5.0 g/dL   CBC and Auto Differential    Collection Time: 11/06/24  5:54 AM   Result Value Ref Range    WBC 9.6 4.4 - 11.3 x10*3/uL    nRBC 0.0 0.0 - 0.0 /100 WBCs    RBC 3.00 (L) 4.00 - 5.20 x10*6/uL    Hemoglobin 9.7 (L) 12.0 - 16.0 g/dL    Hematocrit 30.8 (L) 36.0 - 46.0 %     (H) 80 - 100 fL    MCH 32.3 26.0 - 34.0 pg    MCHC 31.5 (L) 32.0 -  36.0 g/dL    RDW 18.7 (H) 11.5 - 14.5 %    Platelets 75 (L) 150 - 450 x10*3/uL    Neutrophils % 82.2 40.0 - 80.0 %    Immature Granulocytes %, Automated 0.9 0.0 - 0.9 %    Lymphocytes % 7.4 13.0 - 44.0 %    Monocytes % 8.2 2.0 - 10.0 %    Eosinophils % 1.2 0.0 - 6.0 %    Basophils % 0.1 0.0 - 2.0 %    Neutrophils Absolute 7.90 (H) 1.20 - 7.70 x10*3/uL    Immature Granulocytes Absolute, Automated 0.09 0.00 - 0.70 x10*3/uL    Lymphocytes Absolute 0.71 (L) 1.20 - 4.80 x10*3/uL    Monocytes Absolute 0.79 0.10 - 1.00 x10*3/uL    Eosinophils Absolute 0.12 0.00 - 0.70 x10*3/uL    Basophils Absolute 0.01 0.00 - 0.10 x10*3/uL   Vancomycin    Collection Time: 11/06/24  5:54 AM   Result Value Ref Range    Vancomycin 11.3 5.0 - 20.0 ug/mL   POCT GLUCOSE    Collection Time: 11/06/24  7:47 AM   Result Value Ref Range    POCT Glucose 160 (H) 74 - 99 mg/dL   Transthoracic Echo (TTE) Limited    Collection Time: 11/06/24  8:04 AM   Result Value Ref Range    BSA 2.36 m2       Medications:  Scheduled medications  amiodarone, 200 mg, oral, Daily  [Held by provider] apixaban, 5 mg, oral, BID  aspirin, 81 mg, oral, Daily  atorvastatin, 40 mg, oral, q AM  calcium acetate, 1,334 mg, oral, TID  clopidogrel, 75 mg, oral, Daily  fluticasone furoate-vilanteroL, 1 puff, inhalation, Daily  Glucommander - insulin glargine, 1-125 Units, subcutaneous, Daily   And  Glucommander - insulin lispro, 0-125 Units, subcutaneous, With meals & nightly  melatonin, 6 mg, oral, Nightly  midodrine, 5 mg, oral, TID  oxygen, , inhalation, Continuous - Inhalation  pantoprazole, 40 mg, oral, Daily before breakfast  perflutren lipid microspheres, 0.5-10 mL of dilution, intravenous, Once in imaging  sennosides, 2 tablet, oral, BID  sennosides-docusate sodium, 1 tablet, oral, Nightly  vancomycin, 2,000 mg, intravenous, Once  vitamin B complex-vitamin C-folic acid, 1 capsule, oral, Daily      Continuous medications     PRN medications  PRN medications: acetaminophen,  bisacodyl, dextrose, glucagon, glucagon, glucagon HCL, Glucommander - insulin glargine **AND** Glucommander - insulin lispro **AND** Glucommander - insulin lispro **AND** POCT Glucose, guaiFENesin, melatonin, ondansetron, polyethylene glycol, vancomycin       Physical Exam:  Constitutional: awake/alert/oriented x3, no distress, cooperative  Eyes: PERRL  ENMT: mucous membranes moist  Head/Neck: NC/AT   Respiratory/Thorax: clear upper lobes, diminished BB   Cardiovascular: RRR, s1s2 no s3s4 no gmr no edema on exam   Gastrointestinal: Nondistended, soft, non-tender, no rebound tenderness +BS  Extremities: large bruising R groin, no hematoma noted, L groin intact. No oozing or hematoma.   Neurological: alert and oriented x3, intact senses  Psychological: Appropriate mood and behavior   Skin: Warm and dry, intact.       Assessment/Plan   Faviola Pleitez is now s/p TAVR Evolut FX + 29mm via B/L femoral artery (Right CFA primary) on 11/05/2024 with Dr. Hatr and Dr. Trinidad.  TVP kept due to noted to have transient heart block.  Final procedural ECHO showed AV mean gradient 5 mmHg, mild PVL, no PC effusion.  Pt transferred to the CICU for monitoring overnight.    Pre EKG - Afib HR 73 (GA --, )    Post  EKG - Afib and Ventricular paced complexes HR 51 (GA ---, )      POD 1 - No issues overnight.  Vitals, labs, neuro assessment, and groins remained stable.  Pt denies CP, SOB, abd/groin pain, numbness or tingling of BLE.  Chronic diastolic heart failure present as evidence by mild FVO on assessment with BLE edema, mostly clear lungs with diminished bases with faint fine bibasilar crackles, sats stable on 3L NC.  CXR showed bilateral pleural effusion, pulmonary congestion.   POD 1 EKG - HR RRR HR 80 (GA ---, QRS 88).  No events observed on Tele  POD 1 ECHO - Showed EF 51%, AV gradients 30.0 and 14.0 mmHg, DI 0.43, no pericardial effusion noted.       Plan:  - okay to remove TVP, no pacing noted   - okay to  transfer patient [facility] or [facility] service  - Cont ASA (for life)   - Cont home Plavix   - cont home meds (Breo, midodrine, statin)  - follow up with renal in regards HD schedule (plan for HD today)   - okay to resume Eliquis closer to discharge and no evidence of active bleeding.       Post Discharge  - follow up with Structural Heart clinic in one week, 1 month, and 1 year  - f/w (Primary Cards) as scheduled or in 6-10 weeks  - f/w No primary care provider on file. in 1-2 weeks  - pt given Lab recs (1 week) and ECHO rec (1 month)    D/w Dr. Hailey LUJAN spent 30 minutes in the professional and overall care of this patient.     SHARONDA Cornejo-CNP

## 2024-11-06 NOTE — SIGNIFICANT EVENT
Patient s/p successful TAVR POD1 through primary Rt CFA access and L CFA secondary access.   This morning after TVP removal, patient went into asystole , CPR was started and patient was taken for emergency replacement and is planned for urgent PPM/ICD insertion tomorrow.  This afternoon, treating team suspected hematoma on left groin and I was consulted to see patient.    Patient is sedated on respiratory support.   She has a small-medium hematoma in the left groin with echymoses from this morning. The hematoma does not seem to actively evolve during my examination. There is 1g Hg drop from yesterday which is within expected limits post TAVR. I performed a bedside ultrasound and observed medium size hematoma as well as a pseudoaneurysm.     I discussed with Dr. Maher and Dr. Hart and we decided to place a FemoStop until confirmation by formal duplex US tomorrow with possible injection of thrombin if diagnosis is confirmed.    I discussed the plan with the CICU fellow but for now a FemoStop could not be found. I instructed the fellow to perform manual pressure and put manual pressure dressing until tomorrow if FemoStop could not be found.     Plan:   FemoStop or Manual compression + pressure dressing of left groin above arteriotomy  Monitor groin area for hematoma evolution  Monitor distal pulses  Duplex US tomorrow AM     Please reach out for any questions/concerns  Rui Prakash MD  Structural Fellow  [place]/[phone]

## 2024-11-06 NOTE — PROGRESS NOTES
Subjective Data:  Patient denied acute cardiopulmonary symptoms.    Overnight Events:    None     Objective Data:  Last Recorded Vitals:  Vitals:    11/06/24 0700 11/06/24 0800 11/06/24 0810 11/06/24 0900   BP: (!) 97/41 (!) 101/48 (!) 101/48 86/59   Pulse: 86 81 81 88   Resp: 15 26 17 18   Temp:  35.9 °C (96.6 °F)     TempSrc:  Oral     SpO2: 97% 97% 95% 95%   Weight:       Height:         Inpatient Medications:  Scheduled medications   Medication Dose Route Frequency    amiodarone  200 mg oral Daily    [Held by provider] apixaban  5 mg oral BID    aspirin  81 mg oral Daily    atorvastatin  40 mg oral q AM    calcium acetate  1,334 mg oral TID    clopidogrel  75 mg oral Daily    fluticasone furoate-vilanteroL  1 puff inhalation Daily    Glucommander - insulin glargine  1-125 Units subcutaneous Daily    And    Glucommander - insulin lispro  0-125 Units subcutaneous With meals & nightly    melatonin  6 mg oral Nightly    midodrine  5 mg oral TID    oxygen   inhalation Continuous - Inhalation    pantoprazole  40 mg oral Daily before breakfast    perflutren lipid microspheres  0.5-10 mL of dilution intravenous Once in imaging    sennosides  2 tablet oral BID    sennosides-docusate sodium  1 tablet oral Nightly    vancomycin  2,000 mg intravenous Once    vitamin B complex-vitamin C-folic acid  1 capsule oral Daily     PRN medications   Medication    acetaminophen    bisacodyl    dextrose    glucagon    glucagon    glucagon HCL    Glucommander - insulin lispro    guaiFENesin    melatonin    ondansetron    polyethylene glycol    vancomycin     Continuous Medications   Medication Dose Last Rate       Physical Exam:  General: NAD, sitting in chair  Skin: warm and dry   Head/ neck: no JVD seen at 90 degrees  Cardiac: RRR, S1, S2   Pulm: CTAB, O2 via nc   GI: soft, nontender   Extremities: no LE edema, chronic bilateral lymphedema; right fem groin site with minimal ecchymosis, nontender, BLE warm; LUE AVF  Neuro: no focal  neuro deficits   Psych: appropriate mood and behavior     12-lead ECG on 11/6/2024:      Acute CHB after the removal of TVP on 11/6/2024 PM:         Assessment/Plan   Faviola Pleitez is a 69 y.o. female with PMH of ESRD/HD, multivessel obstructive CAD with previous stent (LM-LAD prox 80-90% s/p LAD under-expanded stents, severe mid LAD 90%, severe prox 95% LCx), ICM/HFrEF (LVEF 30% in 10/2024), lymphedema, anemia of chronic disease, obesity (BMI 46.5), chronic respiratory failure (on 3LNC at home), DM2, HLP, and HTN, who underwent successful TAVR with preDil with TrueDil 21 mm balloon and 22 mm balloon and successful TAVR with Evolut FX+ 29 mm. The procedure was complicated with transient CHB and EP was consulted for the further evaluation.     EP Problems:  - Post-TAVR transient CHB with spontaneous improvement of AV conduction: NO requirement of TVP pacing for the last 24 hours after the TAVR procedure but patient developed acute sustained CHB  - Newly diagnosed persistent AF in 10/2024 indicated for Amiodarone 200 mg daily  - Frequent monomorphic PVC predominantly originating from either LVOT-LCC area or AMC area     Recommendations:  - EP will plan urgent Medtronic Micra leadless PPM this afternoon.  - Hold Amiodarone 200 mg once daily  - Avoid any kinds of AVN blocking agents     EP will follow this case later.     Betsy Garcias MD  Clinical Cardiac Electrophysiology Fellow    Peripheral IV 20 G Right Antecubital (Active)   Site Assessment Clean;Dry;Intact 11/06/24 0800   Dressing Type Transparent 11/06/24 0800   Line Status Capped 11/06/24 0800   Dressing Status Occlusive;Clean;Dry 11/06/24 0800   Number of days: 9       Peripheral IV 10/29/24 22 G Right;Anterior Forearm (Active)   Site Assessment Dry;Intact 11/06/24 0800   Dressing Type Transparent 11/06/24 0800   Line Status Capped;Blood return noted 11/06/24 0800   Dressing Status Occlusive;Dry;Clean 11/06/24 0800   Number of days: 8       Venous Sheath  11/05/24 1008 7 Fr. Left Femoral (Active)   Site Assessment Clean;Dry;Intact 11/06/24 0800   Line Status Intact and in place 11/06/24 0800   Dressing Occlusive 11/06/24 0800   Dressing Status Clean;Dry;Intact 11/06/24 0800   Dressing Intervention Dressing reinforced 11/06/24 0800   Dressing Change Due 11/12/24 11/06/24 0800   Color/Movement/Sensation Capillary refill less than 3 sec;Distal pulses palpable 11/06/24 0800   Number of days: 1       Hemodialysis Arteriovenous Fistula 11/03/24 Left Upper arm (Active)   AV Fistula Present;Thrill;Bruit 11/06/24 0800   Site Assessment Clean;Dry 11/06/24 0800   Current State Active 11/06/24 0800   Status Deaccessed 11/06/24 0800   Is Maturing No 11/06/24 0800   Number of days: 3       Code Status:  Full Code    I spent 60 minutes in the professional and overall care of this patient.        Betsy Garcias MD

## 2024-11-06 NOTE — PROGRESS NOTES
Faviola Sagastume is a 69 y.o. female on day 9 of admission presenting with Severe aortic stenosis.      Subjective   Patient did not require any TVP overnight. Patient was doing well this morning. She was evaluated by all the medical teams and was decided to de escalate care to the floor. When patient had her TVP pulled, the patient coded to asystole. At this point we started compressions and gave 1 round of epinephrine, achieved ROSC and started pacing transcutaneously. Thereafter, patient was emergently taken to cath lab for placement of TVP again. She was brought back to the CICU and lost beat again for a couple beats due to lost capture. She is currently being paced and after cath lab she was hypotensive. Started patient on levo and dopamine. Arterial line was placed. Patient has been in good spirits and handling the situation well. Thereafter patient did have a concern for a lower extremity hematoma, after the nely was pulled for TVP initially. Holding pressure on site. Plant is to place PPM 11/7 AM after stabilization of patient.    Objective     Last Recorded Vitals  /76   Pulse 80   Temp 35.7 °C (96.3 °F)   Resp 19   Wt 123 kg (272 lb)   SpO2 96%   Intake/Output last 3 Shifts:    Intake/Output Summary (Last 24 hours) at 11/6/2024 1643  Last data filed at 11/6/2024 1343  Gross per 24 hour   Intake --   Output 100 ml   Net -100 ml       Admission Weight  Weight: 119 kg (262 lb 12.6 oz) (10/28/24 0829)    Daily Weight  11/06/24 : 123 kg (272 lb)    Image Results  Cardiac Catheterization Procedure     [facility], Cath Lab, [address] [place] [address]    Cardiovascular Catheterization Report    Patient Name:      FAVIOLA SAGASTUME       Performing Physician:  [id number] Hart MD  Study Date:        11/5/2024            Verifying Physician:   Cindy Desai                                                                  Hailey HARRIS  MRN/PID:           [id number]             Cardiologist/Co-Scrub:  Accession#:        [id number]         Ordering Provider:     [id number] LEIGHANN FORTE                                                                 HAILEY  Date of Birth/Age: 1955 / 69 years Cardiologist:  Gender:            F                    Fellow:                [id number] Rui Prakash MD  Encounter#:        [id number]           Surgeon:               Corey Trinidad MD       Study: EBONY - Transcatheter Aortic Valve Implantation       Indications:  Severe aortic stenosis.     Transcatheter Aortic Valve Replacement (TAVR):  The right femoral artery was accessed using the transfemoral method. A 6 Fr sheath was inserted followed by the deployment of 2 Proglides. A 16 F Sentrant Sheath was inserted and sutured. The left femoral artery was accessed percutaneously and a 6 Afghan contralateral sheath was placed. A 7 Afghan temporary pacemaker was inserted through the left femoral vein and advanced to the right ventricular apex. Adequate pacing thresholds were obtained. After crossing the stenotic aortic valve, balloon aortic valvuloplasty was performed with a 21 x 45 mm True Dilatation. Evolut FX+ 29 mm valve was successfully deployed under rapid ventricular pacing at 160 BPM. Transthoracic echo performed post valve deployment revealed no central aortic insufficiency and mild paravalvular aortic insufficiency. No device related events. No bleeding events occurred during the procedure. No vascular access complications were revealed. Access site was closed using 2 ProGlide devices. Hemostasis was achieved in the   left femoral artery using a ProGlide device. Temporary pacing wire was sutured in place.       Hemo Personnel:  +-----------------------+---------+  Name                   Duty        +-----------------------+---------+  Lloyd Hart MD 1  +-----------------------+---------+       Hemodynamic Pressures:     +----+---------------+----------+-------------+--------------+-------+---------+  Site   Date Time   Phase NameSystolic mmHgDiastolic mmHgED mmHgMean mmHg  +----+---------------+----------+-------------+--------------+-------+---------+   LFA      11/5/2024      Rest          138            57              83          10:37:40 AM                                                       +----+---------------+----------+-------------+--------------+-------+---------+   LFA      11/5/2024      Rest          142            58              84          10:37:43 AM                                                       +----+---------------+----------+-------------+--------------+-------+---------+    LV      11/5/2024      Rest          168            13     20                   10:59:29 AM                                                       +----+---------------+----------+-------------+--------------+-------+---------+    AO      11/5/2024      Rest          108            43              61          10:59:29 AM                                                       +----+---------------+----------+-------------+--------------+-------+---------+    LV      11/5/2024      Rest          110            45     55                   11:03:55 AM                                                       +----+---------------+----------+-------------+--------------+-------+---------+    AO      11/5/2024      Rest          101            39              59          11:03:55 AM                                                       +----+---------------+----------+-------------+--------------+-------+---------+    AO      11/5/2024      Rest          144            63               90          11:50:44 AM                                                       +----+---------------+----------+-------------+--------------+-------+---------+    LV      11/5/2024      Rest          133             7     23                   11:50:44 AM                                                       +----+---------------+----------+-------------+--------------+-------+---------+    AO      11/5/2024      Rest          146            61              91          11:50:53 AM                                                       +----+---------------+----------+-------------+--------------+-------+---------+    LV      11/5/2024      Rest          141             7     22                   11:50:53 AM                                                       +----+---------------+----------+-------------+--------------+-------+---------+       Complications:  No vascular access complications were revealed. No bleeding events occurred during the procedure. No device related events.     Cardiac Cath Post Procedure Notes:  Post Procedure Diagnosis: S/p successful TAVR with Evolut FX+ 29mm.  Blood Loss:               Estimated blood loss during the procedure was 15 mls.  Specimens Removed:        Number of specimen(s) removed: none.    ____________________________________________________________________________________  CONCLUSIONS:   1. Transcatheter aortic valve replacement with successful implantation of Evolut FX+ 29 mm valve.   2. Patient was enrolled in a research study and data was included in the TVT Registry.    ICD 10 Codes:  Nonrheumatic aortic (valve) stenosis-I35.0     CPT Codes:  EBONY Perc,femoral-81155.62     [id number] Lloyd Hart MD  Performing Physician  Electronically signed by [id number] Lloyd Hart MD on 11/6/2024 at 9:07:37 AM         ** Final **  XR chest 1 view  Narrative: Interpreted By:  Tereso Hampton  and Brad Godfrey   STUDY:  XR  CHEST 1 VIEW;  11/6/2024 3:20 pm      INDICATION:  Signs/Symptoms:placement of tvp.          COMPARISON:  Chest x-ray 11/06/2022 11 p.m.      ACCESSION NUMBER(S):  [id number]      ORDERING CLINICIAN:  THUY PATTERSON      FINDINGS:  AP radiograph of the chest was provided.      Interval retraction of the left IJ approach cardiac pacing were with  tip now projecting within the expected location of the right  ventricle. Status post TAVR.      CARDIOMEDIASTINAL SILHOUETTE:  Cardiomediastinal silhouette is stable in size and configuration.      LUNGS:  The lungs are hypoexpanded with associated bronchovascular crowding.  There is stable diffuse interstitial prominence. Stable right  subsegmental basilar atelectasis. There is no new focal  consolidation. There is no pneumothorax. The costophrenic angles  clear. The left costophrenic is again obscured by the  cardiomediastinal silhouette.      ABDOMEN:  No remarkable upper abdominal findings.      BONES:  No acute osseous changes.      Impression: 1. Interval retraction of left IJ approach cardiac pacing wire with  tip now projecting over the right ventricle.  2. Stable pulmonary interstitial edema.  3. Stable right basilar subsegmental atelectasis.      I personally reviewed the images/study and I agree with the findings  as stated by Bekah Salinas MD (PGY-2). This study was interpreted at  [place], [place].      MACRO:  None      Signed by: Tereso Hampton 11/6/2024 4:08 PM  Dictation workstation:   [id number]  XR chest 1 view  Narrative: Interpreted By:  Tereso Hampton and Omar Mahmoud   STUDY:  XR CHEST 1 VIEW;  11/6/2024 2:57 pm      INDICATION:  Signs/Symptoms:tvp.          COMPARISON:  Chest x-ray 11/06/2024 9:06 a.m.      ACCESSION NUMBER(S):  [id number]      ORDERING CLINICIAN:  THUY PATTERSON      FINDINGS:  AP radiograph of the chest was provided.      Interval removal of an inferior approach cardiac pacing  wire.  Interval placement of a left internal jugular venous approach cardiac  pacing wire with tip projecting over the expected location of the  distal main pulmonary artery. Status post TAVR.      CARDIOMEDIASTINAL SILHOUETTE:  Cardiomediastinal silhouette is stable in size and configuration.      LUNGS:  The lungs are hypoexpanded with associated bronchovascular crowding.  Diffuse interstitial prominence, stable as compared to prior. Stable  right subsegmental atelectasis. There is no new focal consolidation.  There is no pneumothorax. The left costophrenic angle is again  partially obscured by the cardiomediastinal silhouette. The right  costophrenic angle is clear.      ABDOMEN:  No remarkable upper abdominal findings.      BONES:  No acute osseous changes.      Impression: 1. Interval placement of a left IJ approach cardiac pacer with tip  projecting over the distal main pulmonary artery.  2. Stable mild pulmonary interstitial edema.  3. Stable right subsegmental atelectasis.      I personally reviewed the images/study and I agree with the findings  as stated by Bekah Salinas MD (PGY-2). This study was interpreted at  [place], [place].      MACRO:  None      Signed by: Tereso Hampton 11/6/2024 4:07 PM  Dictation workstation:   [id number]  XR chest 1 view  Narrative: Interpreted By:  Tereso Hampton and Omar Mahmoud   STUDY:  XR CHEST 1 VIEW;  11/6/2024 9:06 am      INDICATION:  Signs/Symptoms:s/p TAVR.          COMPARISON:  Chest x-ray 11/05/2024 1:10 p.m.      ACCESSION NUMBER(S):  [id number]      ORDERING CLINICIAN:  WILI LIVE      FINDINGS:  AP radiograph of the chest was provided.      Postoperative findings status post TAVR. Inferior approach cardiac  pacing wire is retracted compared to prior with distal radiopaque  markers now projecting over the midline.      CARDIOMEDIASTINAL SILHOUETTE:  Cardiomediastinal silhouette is stable in size and  configuration.      LUNGS:  Diffuse interstitial prominence, grossly stable as compared to prior.  Right basilar subsegmental atelectasis, mildly decreased as compared  to prior. The right costophrenic angle is clear. The left  costophrenic angle is obscured by an enlarged cardiomediastinal  silhouette. There is no new focal consolidation. There is no  pneumothorax.      ABDOMEN:  No remarkable upper abdominal findings.      BONES:  No acute osseous changes.      Impression: 1. Interval retraction of an inferior approach cardiac pacing wire.  2. Stable mild pulmonary interstitial edema.  3. Right subsegmental basilar atelectasis is mildly decreased.      I personally reviewed the images/study and I agree with the findings  as stated by Bekah Salinas MD (PGY-2). This study was interpreted at  [place], [place].      MACRO:  None      Signed by: Tereso Hampton 11/6/2024 4:05 PM  Dictation workstation:   [id number]  Transthoracic Echo (TTE) [facility], [address] [place] [address]                 Tel [phone] and Fax [phone]    TRANSTHORACIC ECHOCARDIOGRAM REPORT       Patient Name:       JUDY SAGASTUME      Reading Physician:    [id number] Thu Solis MD  Study Date:         11/6/2024           Ordering Provider:    [id number] WILI LIVE  MRN/PID:            [id number]            Fellow:  Accession#:         [id number]        Nurse:  Date of Birth/Age:  1955 / 69      Sonographer:          Tati guido RDCS  Gender assigned at  F                   Additional Staff:  Birth:  Height:             162.56 cm           Admit Date:           11/5/2024  Weight:             123.38 kg           Admission Status:     Inpatient -                                                                 Routine  BSA / BMI:          2.23 m2 / 46.69     Encounter#:           [id number]                      kg/m2  Blood Pressure:     131/98 mmHg         Department Location:  [facility]    Study Type:    TRANSTHORACIC ECHO (TTE) LIMITED  Diagnosis/ICD: Presence of prosthetic heart valve-Z95.2  Indication:    s/p TAVR  CPT Code:      Echo Limited-36621    Patient History:  Valve Disorders: Aortic Valve Replacement.  Diabetes:        Yes    Study Detail: The following Echo studies were performed: 2D, M-Mode, Doppler and                color flow. Definity used as a contrast agent for endocardial                border definition. Total contrast used for this procedure was 1 mL                via IV push.       PHYSICIAN INTERPRETATION:  Left Ventricle: Left ventricular ejection fraction is mildly decreased, calculated by Daniels's biplane at 52%. There are no regional left ventricular wall motion abnormalities. The left ventricular cavity size is normal. Left ventricular diastolic filling cannot be determined, due to atrial fibrillation/flutter.  Left Atrium: The left atrium was not assessed.  Right Ventricle: The right ventricle was not well visualized. There is reduced right ventricular systolic function. A device is visualized in the right ventricle.  Right Atrium: The right atrium was not well visualized. There is a device visualized in the right atrium.  Aortic Valve: There is a prosthetic aortic valve present. The aortic valve dimensionless index is 0.40. There is a Medtronic transcatheter aortic valve bioprosthesis with a 29 mm reported size. There is mild aortic valve regurgitation. The peak instantaneous gradient of the aortic valve is 23 mmHg. The mean gradient of the aortic valve is 11 mmHg. S/p 29mm Medtronic Evolut FX+ TAVR with gradients of 23/11mmHg and mild paravalvlar AI. ( NOte4 gradinets and VTI averaged due to AFib).  Mitral Valve: The mitral valve is  normal in structure. There is moderate mitral annular calcification. There is trace mitral valve regurgitation.  Tricuspid Valve: The tricuspid valve is structurally normal. There is mild tricuspid regurgitation. The Doppler estimated RVSP is moderately elevated at 53.9 mmHg.  Pulmonic Valve: The pulmonic valve is not well visualized. There is physiologic pulmonic valve regurgitation.  Pericardium: Trivial pericardial effusion.  Aorta: The aortic root is normal.  Systemic Veins: The inferior vena cava appears dilated, with IVC inspiratory collapse less than 50%. Line noted within the IVC.  In comparison to the previous echocardiogram(s): Compared with study dated 11/5/2024,. Compared with the periprocedural echo from 11/5/2024 the post TAVR gradients were11/5mmHg and are higher today. There was already mild perivalvular AI at that time. LVEF could not be seen on the prior exam.       CONCLUSIONS:   1. Poorly visualized anatomical structures due to suboptimal image quality.   2. Left ventricular ejection fraction is mildly decreased, calculated by Daniels's biplane at 52%.   3. Left ventricular diastolic filling cannot be determined, due to atrial fibrillation/flutter.   4. There is reduced right ventricular systolic function.   5. There is moderate mitral annular calcification.   6. Moderately elevated right ventricular systolic pressure.   7. S/p 29mm Medtronic Evolut FX+ TAVR with gradients of 23/11mmHg and mild paravalvlar AI. ( NOte4 gradinets and VTI averaged due to AFib).   8. There is a Medtronic transcatheter aortic valve bioprosthesis with a 29 mm reported size.   9. Mild aortic valve regurgitation.  10. Compared with the periprocedural echo from 11/5/2024 the post TAVR gradients were11/5mmHg and are higher today. There was already mild perivalvular AI at that time. LVEF could not be seen on the prior exam.    RECOMMENDATIONS:  Utilizing an FDA cleared automated machine learning algorithm (Echo[place] Heart  Failure by [employer]), the analysis of the apical 4-chamber echocardiogram suggests the presence of heart failure with preserved ejection fraction (HFpEF)*. Clinical correlation looking for additional heart failure signs and symptoms is recommended, as a definite diagnosis of heart failure cannot be made by imaging alone.  *Per ACC/AHA/HFSA universal diagnosis of heart failure, HFpEF is defined as 1) signs and symptoms leading to clinical diagnosis of heart failure, 2) an ejection fraction of at least 50%, and 3) evidence of elevated intra-cardiac filling pressures by echocardiography, BNP elevation, or catheterization.     QUANTITATIVE DATA SUMMARY:     2D MEASUREMENTS:          Normal Ranges:  LAs:             3.50 cm  (2.7-4.0cm)  IVSd:            0.90 cm  (0.6-1.1cm)  LVPWd:           1.10 cm  (0.6-1.1cm)  LVIDd:           5.10 cm  (3.9-5.9cm)  LVIDs:           3.50 cm  LV Mass Index:   84 g/m2  LVEDV Index:     68 ml/m2  LV % FS          31.4 %       AORTA MEASUREMENTS:         Normal Ranges:  Ao Sinus, d:        2.40 cm (2.1-3.5cm)  Asc Ao, d:          2.80 cm (2.1-3.4cm)       LV SYSTOLIC FUNCTION BY 2D PLANIMETRY (MOD):                       Normal Ranges:  EF-A4C View:    51 % (>=55%)  EF-A2C View:    54 %  EF-Biplane:     52 %  LV EF Reported: 52 %       LV DIASTOLIC FUNCTION:           Normal Ranges:  MV Peak E:             1.12 m/s  (0.7-1.2 m/s)  MV e'                  0.088 m/s (>8.0)  MV lateral e'          0.10 m/s  MV medial e'           0.08 m/s  E/e' Ratio:            12.77     (<8.0)       MITRAL VALVE:          Normal Ranges:  MV DT:        225 msec (150-240msec)       AORTIC VALVE:                      Normal Ranges:  AoV Vmax:                2.38 m/s  (<=1.7m/s)  AoV Peak P.7 mmHg (<20mmHg)  AoV Mean P.0 mmHg (1.7-11.5mmHg)  LVOT Max Elian:            1.12 m/s  (<=1.1m/s)  AoV VTI:                 42.60 cm  (18-25cm)  LVOT VTI:                17.20 cm  LVOT  Diameter:           1.90 cm   (1.8-2.4cm)  AoV Area, VTI:           1.14 cm2  (2.5-5.5cm2)  AoV Area,Vmax:           1.33 cm2  (2.5-4.5cm2)  AoV Dimensionless Index: 0.40       RIGHT VENTRICLE:  TAPSE: 20.8 mm       TRICUSPID VALVE/RVSP:          Normal Ranges:  Peak TR Velocity:     3.12 m/s  RV Syst Pressure:     54 mmHg  (< 30mmHg)  IVC Diam:             2.80 cm       PULMONIC VALVE:          Normal Ranges:  PV Max Elian:     1.2 m/s  (0.6-0.9m/s)  PV Max P.0 mmHg       [id number] Thu Solis MD  Electronically signed on 2024 at 2:01:45 PM       ** Final **    Constitutional: Well-developed female in no acute distress.  HEENT: Normocephalic, atraumatic. PERRL. EOMI.  Respiratory: CTA bilaterally. No wheezes, rales, or rhonchi. Normal respiratory effort.  Cardiovascular: transvenous paced, No murmurs, gallops, or rubs. No JVD. Radial pulses 2+.  Abdominal: Soft, nondistended, nontender to palpation. Has diffuse bruising on abdomen with TVP reinsertion in groin.  Neuro: AOx3. No focal deficit. UE and LE strength 5/5 bilaterally and sensation intact.   MSK: lower extremity wounds and bandaged left extremity  Skin: Warm, dry. No rashes or wounds.   Psych: Appropriate mood and affect.      Relevant Results  Scheduled medications  amiodarone, 200 mg, oral, Daily  [Held by provider] apixaban, 5 mg, oral, BID  aspirin, 81 mg, oral, Daily  atorvastatin, 40 mg, oral, q AM  calcium acetate, 1,334 mg, oral, TID  clopidogrel, 75 mg, oral, Daily  insulin regular, 10 Units, intravenous, Once   Followed by  dextrose, 25 g, intravenous, Once  etomidate, , ,   fentaNYL PF, , ,   fluticasone furoate-vilanteroL, 1 puff, inhalation, Daily  Glucommander - insulin glargine, 1-125 Units, subcutaneous, Daily   And  Glucommander - insulin lispro, 0-125 Units, subcutaneous, With meals & nightly  melatonin, 6 mg, oral, Nightly  midodrine, 5 mg, oral, TID  oxygen, , inhalation, Continuous - Inhalation  oxygen, , inhalation, Continuous  - Inhalation  pantoprazole, 40 mg, oral, Daily before breakfast  perflutren lipid microspheres, 0.5-10 mL of dilution, intravenous, Once in imaging  polyethylene glycol, 17 g, oral, Daily  sennosides, 2 tablet, oral, BID  sennosides-docusate sodium, 1 tablet, oral, Nightly  vancomycin, 1,000 mg, intravenous, Once  vasopressin, , ,   vasopressin, , ,   vasopressin, , ,   vitamin B complex-vitamin C-folic acid, 1 capsule, oral, Daily      Continuous medications  dextrose 10 % in water (D10W), 50 mL/hr  DOPamine, 0-10 mcg/kg/min, Last Rate: Stopped (11/06/24 1330)  fentaNYL,  mcg/hr, Last Rate: 25 mcg/hr (11/06/24 1617)  midazolam, 0-20 mg/hr, Last Rate: 2 mg/hr (11/06/24 1626)  norepinephrine, 0-0.2 mcg/kg/min, Last Rate: 0.16 mcg/kg/min (11/06/24 1627)  vasopressin, 0.03 Units/min      PRN medications  PRN medications: acetaminophen, bisacodyl, dextrose, etomidate, fentaNYL PF, glucagon, glucagon, glucagon HCL, Glucommander - insulin glargine **AND** Glucommander - insulin lispro **AND** Glucommander - insulin lispro **AND** POCT Glucose, guaiFENesin, HYDROmorphone, melatonin, midazolam, midazolam, ondansetron, polyethylene glycol, vancomycin, vasopressin, vasopressin, vasopressin    Results for orders placed or performed during the hospital encounter of 10/28/24 (from the past 24 hours)   POCT GLUCOSE   Result Value Ref Range    POCT Glucose 203 (H) 74 - 99 mg/dL   Magnesium   Result Value Ref Range    Magnesium 2.24 1.60 - 2.40 mg/dL   Renal Function Panel   Result Value Ref Range    Glucose 147 (H) 74 - 99 mg/dL    Sodium 140 136 - 145 mmol/L    Potassium 5.4 (H) 3.5 - 5.3 mmol/L    Chloride 99 98 - 107 mmol/L    Bicarbonate 29 21 - 32 mmol/L    Anion Gap 17 10 - 20 mmol/L    Urea Nitrogen 46 (H) 6 - 23 mg/dL    Creatinine 5.42 (H) 0.50 - 1.05 mg/dL    eGFR 8 (L) >60 mL/min/1.73m*2    Calcium 8.3 (L) 8.6 - 10.6 mg/dL    Phosphorus 4.4 2.5 - 4.9 mg/dL    Albumin 3.4 3.4 - 5.0 g/dL   CBC and Auto Differential    Result Value Ref Range    WBC 9.6 4.4 - 11.3 x10*3/uL    nRBC 0.0 0.0 - 0.0 /100 WBCs    RBC 3.00 (L) 4.00 - 5.20 x10*6/uL    Hemoglobin 9.7 (L) 12.0 - 16.0 g/dL    Hematocrit 30.8 (L) 36.0 - 46.0 %     (H) 80 - 100 fL    MCH 32.3 26.0 - 34.0 pg    MCHC 31.5 (L) 32.0 - 36.0 g/dL    RDW 18.7 (H) 11.5 - 14.5 %    Platelets 75 (L) 150 - 450 x10*3/uL    Neutrophils % 82.2 40.0 - 80.0 %    Immature Granulocytes %, Automated 0.9 0.0 - 0.9 %    Lymphocytes % 7.4 13.0 - 44.0 %    Monocytes % 8.2 2.0 - 10.0 %    Eosinophils % 1.2 0.0 - 6.0 %    Basophils % 0.1 0.0 - 2.0 %    Neutrophils Absolute 7.90 (H) 1.20 - 7.70 x10*3/uL    Immature Granulocytes Absolute, Automated 0.09 0.00 - 0.70 x10*3/uL    Lymphocytes Absolute 0.71 (L) 1.20 - 4.80 x10*3/uL    Monocytes Absolute 0.79 0.10 - 1.00 x10*3/uL    Eosinophils Absolute 0.12 0.00 - 0.70 x10*3/uL    Basophils Absolute 0.01 0.00 - 0.10 x10*3/uL   Vancomycin   Result Value Ref Range    Vancomycin 11.3 5.0 - 20.0 ug/mL   POCT GLUCOSE   Result Value Ref Range    POCT Glucose 160 (H) 74 - 99 mg/dL   Transthoracic Echo (TTE) Limited   Result Value Ref Range    AV pk uvaldo 2.38 m/s    AV mn grad 11 mmHg    LVOT diam 1.90 cm    Tricuspid annular plane systolic excursion 2.1 cm    LV EF 52 %    LVIDd 5.10 cm    RVSP 53.9 mmHg    Aortic Valve Area by Continuity of VTI 1.14 cm2    Aortic Valve Area by Continuity of Peak Velocity 1.33 cm2    AV pk grad 23 mmHg    LV A4C EF 50.9    POCT GLUCOSE   Result Value Ref Range    POCT Glucose 160 (H) 74 - 99 mg/dL   POCT GLUCOSE   Result Value Ref Range    POCT Glucose 166 (H) 74 - 99 mg/dL   POCT GLUCOSE   Result Value Ref Range    POCT Glucose 170 (H) 74 - 99 mg/dL   Renal function panel   Result Value Ref Range    Glucose 176 (H) 74 - 99 mg/dL    Sodium 139 136 - 145 mmol/L    Potassium 4.7 3.5 - 5.3 mmol/L    Chloride 106 98 - 107 mmol/L    Bicarbonate 20 (L) 21 - 32 mmol/L    Anion Gap 18 10 - 20 mmol/L    Urea Nitrogen 43 (H) 6 - 23  mg/dL    Creatinine 4.81 (H) 0.50 - 1.05 mg/dL    eGFR 9 (L) >60 mL/min/1.73m*2    Calcium 6.7 (L) 8.6 - 10.6 mg/dL    Phosphorus 3.7 2.5 - 4.9 mg/dL    Albumin 2.7 (L) 3.4 - 5.0 g/dL   Magnesium   Result Value Ref Range    Magnesium 1.78 1.60 - 2.40 mg/dL   CBC and Auto Differential   Result Value Ref Range    WBC 21.0 (H) 4.4 - 11.3 x10*3/uL    nRBC 0.2 (H) 0.0 - 0.0 /100 WBCs    RBC 2.74 (L) 4.00 - 5.20 x10*6/uL    Hemoglobin 8.7 (L) 12.0 - 16.0 g/dL    Hematocrit 27.9 (L) 36.0 - 46.0 %     (H) 80 - 100 fL    MCH 31.8 26.0 - 34.0 pg    MCHC 31.2 (L) 32.0 - 36.0 g/dL    RDW 18.5 (H) 11.5 - 14.5 %    Platelets 72 (L) 150 - 450 x10*3/uL    Neutrophils % 87.9 40.0 - 80.0 %    Immature Granulocytes %, Automated 2.8 (H) 0.0 - 0.9 %    Lymphocytes % 3.4 13.0 - 44.0 %    Monocytes % 5.3 2.0 - 10.0 %    Eosinophils % 0.4 0.0 - 6.0 %    Basophils % 0.2 0.0 - 2.0 %    Neutrophils Absolute 18.45 (H) 1.20 - 7.70 x10*3/uL    Immature Granulocytes Absolute, Automated 0.59 0.00 - 0.70 x10*3/uL    Lymphocytes Absolute 0.71 (L) 1.20 - 4.80 x10*3/uL    Monocytes Absolute 1.11 (H) 0.10 - 1.00 x10*3/uL    Eosinophils Absolute 0.09 0.00 - 0.70 x10*3/uL    Basophils Absolute 0.04 0.00 - 0.10 x10*3/uL   Lactate   Result Value Ref Range    Lactate 1.7 0.4 - 2.0 mmol/L   Blood Gas Arterial Full Panel   Result Value Ref Range    POCT pH, Arterial 7.37 (L) 7.38 - 7.42 pH    POCT pCO2, Arterial 34 (L) 38 - 42 mm Hg    POCT pO2, Arterial 177 (H) 85 - 95 mm Hg    POCT SO2, Arterial 99 94 - 100 %    POCT Oxy Hemoglobin, Arterial 96.9 94.0 - 98.0 %    POCT Hematocrit Calculated, Arterial 27.0 (L) 36.0 - 46.0 %    POCT Sodium, Arterial 134 (L) 136 - 145 mmol/L    POCT Potassium, Arterial 4.6 3.5 - 5.3 mmol/L    POCT Chloride, Arterial 107 98 - 107 mmol/L    POCT Ionized Calcium, Arterial 0.95 (L) 1.10 - 1.33 mmol/L    POCT Glucose, Arterial 176 (H) 74 - 99 mg/dL    POCT Lactate, Arterial 1.5 0.4 - 2.0 mmol/L    POCT Base Excess, Arterial  -5.0 (L) -2.0 - 3.0 mmol/L    POCT HCO3 Calculated, Arterial 19.7 (L) 22.0 - 26.0 mmol/L    POCT Hemoglobin, Arterial 9.0 (L) 12.0 - 16.0 g/dL    POCT Anion Gap, Arterial 12 10 - 25 mmo/L    Patient Temperature 37.0 degrees Celsius    FiO2 100 %   Blood Gas Arterial Full Panel   Result Value Ref Range    POCT pH, Arterial 7.30 (L) 7.38 - 7.42 pH    POCT pCO2, Arterial 44 (H) 38 - 42 mm Hg    POCT pO2, Arterial 158 (H) 85 - 95 mm Hg    POCT SO2, Arterial 100 94 - 100 %    POCT Oxy Hemoglobin, Arterial 97.4 94.0 - 98.0 %    POCT Hematocrit Calculated, Arterial 32.0 (L) 36.0 - 46.0 %    POCT Sodium, Arterial 133 (L) 136 - 145 mmol/L    POCT Potassium, Arterial 5.7 (H) 3.5 - 5.3 mmol/L    POCT Chloride, Arterial 100 98 - 107 mmol/L    POCT Ionized Calcium, Arterial 1.06 (L) 1.10 - 1.33 mmol/L    POCT Glucose, Arterial 254 (H) 74 - 99 mg/dL    POCT Lactate, Arterial 4.1 (HH) 0.4 - 2.0 mmol/L    POCT Base Excess, Arterial -4.7 (L) -2.0 - 3.0 mmol/L    POCT HCO3 Calculated, Arterial 21.6 (L) 22.0 - 26.0 mmol/L    POCT Hemoglobin, Arterial 10.6 (L) 12.0 - 16.0 g/dL    POCT Anion Gap, Arterial 17 10 - 25 mmo/L    Patient Temperature 37.0 degrees Celsius    FiO2 100 %   POCT GLUCOSE   Result Value Ref Range    POCT Glucose 184 (H) 74 - 99 mg/dL   Blood Gas Arterial Full Panel   Result Value Ref Range    POCT pH, Arterial 7.31 (L) 7.38 - 7.42 pH    POCT pCO2, Arterial 41 38 - 42 mm Hg    POCT pO2, Arterial 95 85 - 95 mm Hg    POCT SO2, Arterial 98 94 - 100 %    POCT Oxy Hemoglobin, Arterial 95.8 94.0 - 98.0 %    POCT Hematocrit Calculated, Arterial 31.0 (L) 36.0 - 46.0 %    POCT Sodium, Arterial 131 (L) 136 - 145 mmol/L    POCT Potassium, Arterial 5.8 (H) 3.5 - 5.3 mmol/L    POCT Chloride, Arterial 100 98 - 107 mmol/L    POCT Ionized Calcium, Arterial 1.07 (L) 1.10 - 1.33 mmol/L    POCT Glucose, Arterial 232 (H) 74 - 99 mg/dL    POCT Lactate, Arterial 4.3 (HH) 0.4 - 2.0 mmol/L    POCT Base Excess, Arterial -5.3 (L) -2.0 - 3.0  mmol/L    POCT HCO3 Calculated, Arterial 20.6 (L) 22.0 - 26.0 mmol/L    POCT Hemoglobin, Arterial 10.2 (L) 12.0 - 16.0 g/dL    POCT Anion Gap, Arterial 16 10 - 25 mmo/L    Patient Temperature 37.0 degrees Celsius    FiO2 50 %   CBC   Result Value Ref Range    WBC 22.5 (H) 4.4 - 11.3 x10*3/uL    nRBC 0.3 (H) 0.0 - 0.0 /100 WBCs    RBC 3.11 (L) 4.00 - 5.20 x10*6/uL    Hemoglobin 10.0 (L) 12.0 - 16.0 g/dL    Hematocrit 33.0 (L) 36.0 - 46.0 %     (H) 80 - 100 fL    MCH 32.2 26.0 - 34.0 pg    MCHC 30.3 (L) 32.0 - 36.0 g/dL    RDW 18.7 (H) 11.5 - 14.5 %    Platelets 91 (L) 150 - 450 x10*3/uL   Transthoracic Echo (TTE) Limited   Result Value Ref Range    AV pk uvaldo 2.70 m/s    LVOT diam 1.70 cm    LV EF 55 %    Aortic Valve Area by Continuity of Peak Velocity 1.47 cm2    AV pk grad 29 mmHg       Transthoracic Echo (TTE) Limited    Result Date: 11/6/2024   [facility], [address] [place] [address]                Tel [phone] and Fax [phone] TRANSTHORACIC ECHOCARDIOGRAM REPORT  Patient Name:       JUDY Orellana Physician:    [id number] Dev Muñoz MD Study Date:         11/6/2024           Ordering Provider:    [id number] THUY PATTERSON MRN/PID:            [id number]            Fellow: Accession#:         [id number]        Nurse: Date of Birth/Age:  1955 / 69      Sonographer:          Tanya guido RDCS Gender assigned at  F                   Additional Staff: Birth: Height:             162.56 cm           Admit Date:           10/28/2024 Weight:             123.38 kg           Admission Status:     Inpatient - STAT BSA / BMI:          2.23 m2 / 46.69     Encounter#:           [id number]                     kg/m2 Blood Pressure:     119/76 mmHg         Department Location:  [place]  [facility] Study Type:    TRANSTHORACIC ECHO (TTE) LIMITED Diagnosis/ICD: Nonrheumatic aortic (valve) stenosis-I35.0 Indication:    Check for effusion CPT Code:      Echo Limited-86097; Doppler Limited-23113; Color Doppler-69841 Patient History: Pertinent History: ESRD on IHD, CAD, s/p PCI, lymphedema, chronic hypoxemia, DM,                    HLD, HTN, HFmrEF, afib, AS s/p TAVR #29mm Evolut FX                    (11/5/24). Study Detail: The following Echo studies were performed: 2D, M-Mode, Doppler and               color flow. Technically challenging study due to body habitus,               patient lying in supine position and prominent lung artifact. The               patient is intubated.  PHYSICIAN INTERPRETATION: Left Ventricle: Left ventricular ejection fraction is low normal, by visual estimate at 55%. The left ventricular cavity size is normal. Abnormal (paradoxical) septal motion, consistent with an intraventricular conduction delay. Left ventricular diastolic filling was not assessed. Left Atrium: The left atrium is enlarged. Right Ventricle: The right ventricle is mildly enlarged. There is low normal right ventricular systolic function. Right Atrium: The right atrium is upper limits of normal in size. Aortic Valve: There is a prosthetic aortic valve present. There is Evolut transcatheter aortic valve bioprosthesis. There is trace aortic valve regurgitation. The peak instantaneous gradient of the aortic valve is 29 mmHg. Mitral Valve: The mitral valve is mildly thickened. There is mild to moderate mitral annular calcification. There is trace mitral valve regurgitation. Tricuspid Valve: The tricuspid valve was not well visualized. There is trace tricuspid regurgitation. Pulmonic Valve: The pulmonic valve is structurally normal. There is trace pulmonic valve regurgitation. Pericardium: Trivial pericardial effusion. Aorta: The aortic root was not well visualized. There is no dilatation of the aortic root. In  comparison to the previous echocardiogram(s): Compared with study dated 2024, no significant change.  CONCLUSIONS:  1. Poorly visualized anatomical structures due to suboptimal image quality.  2. Left ventricular ejection fraction is low normal, by visual estimate at 55%.  3. Mildly enlarged right ventricle.  4. There is low normal right ventricular systolic function.  5. There is Evolut transcatheter aortic valve bioprosthesis. RECOMMENDATIONS: Utilizing an FDA cleared automated machine learning algorithm (EchoGo Heart Failure by [employer]), the analysis of the apical 4-chamber echocardiogram suggests the presence of heart failure with preserved ejection fraction (HFpEF)*. Clinical correlation looking for additional heart failure signs and symptoms is recommended, as a definite diagnosis of heart failure cannot be made by imaging alone. *Per ACC/AHA/HFSA universal diagnosis of heart failure, HFpEF is defined as 1) signs and symptoms leading to clinical diagnosis of heart failure, 2) an ejection fraction of at least 50%, and 3) evidence of elevated intra-cardiac filling pressures by echocardiography, BNP elevation, or catheterization.  QUANTITATIVE DATA SUMMARY:  AORTA MEASUREMENTS:         Normal Ranges: Ao Sinus, d:        2.80 cm (2.1-3.5cm)  LV SYSTOLIC FUNCTION BY 2D PLANIMETRY (MOD):                      Normal Ranges: EF-Visual:      55 % LV EF Reported: 55 %  AORTIC VALVE:            Normal Ranges: AoV Vmax:      2.70 m/s  (<=1.7m/s) AoV Peak P.2 mmHg (<20mmHg) LVOT Max Elian:  1.75 m/s  (<=1.1m/s) LVOT VTI:      19.50 cm LVOT Diameter: 1.70 cm   (1.8-2.4cm) AoV Area,Vmax: 1.47 cm2  (2.5-4.5cm2)  [id number] Dev Muñoz MD Electronically signed on 2024 at 4:58:00 PM  ** Final **     XR chest 1 view    Result Date: 2024  Interpreted By:  Tereso Hampton and Omar Mahmoud STUDY: XR CHEST 1 VIEW;  2024 3:20 pm   INDICATION: Signs/Symptoms:placement of tvp.     COMPARISON: Chest x-ray  11/06/2022 11 p.m.   ACCESSION NUMBER(S): [id number]   ORDERING CLINICIAN: THUY PATTERSON   FINDINGS: AP radiograph of the chest was provided.   Interval retraction of the left IJ approach cardiac pacing were with tip now projecting within the expected location of the right ventricle. Status post TAVR.   CARDIOMEDIASTINAL SILHOUETTE: Cardiomediastinal silhouette is stable in size and configuration.   LUNGS: The lungs are hypoexpanded with associated bronchovascular crowding. There is stable diffuse interstitial prominence. Stable right subsegmental basilar atelectasis. There is no new focal consolidation. There is no pneumothorax. The costophrenic angles clear. The left costophrenic is again obscured by the cardiomediastinal silhouette.   ABDOMEN: No remarkable upper abdominal findings.   BONES: No acute osseous changes.       1. Interval retraction of left IJ approach cardiac pacing wire with tip now projecting over the right ventricle. 2. Stable pulmonary interstitial edema. 3. Stable right basilar subsegmental atelectasis.   I personally reviewed the images/study and I agree with the findings as stated by Bekah Salinas MD (PGY-2). This study was interpreted at [place], [place].   MACRO: None   Signed by: Tereso Hampton 11/6/2024 4:08 PM Dictation workstation:   [id number]    XR chest 1 view    Result Date: 11/6/2024  Interpreted By:  Tereso Hampton and Omar Mahmoud STUDY: XR CHEST 1 VIEW;  11/6/2024 2:57 pm   INDICATION: Signs/Symptoms:tvp.     COMPARISON: Chest x-ray 11/06/2024 9:06 a.m.   ACCESSION NUMBER(S): [id number]   ORDERING CLINICIAN: THUY PATTERSON   FINDINGS: AP radiograph of the chest was provided.   Interval removal of an inferior approach cardiac pacing wire. Interval placement of a left internal jugular venous approach cardiac pacing wire with tip projecting over the expected location of the distal main pulmonary artery. Status post TAVR.    CARDIOMEDIASTINAL SILHOUETTE: Cardiomediastinal silhouette is stable in size and configuration.   LUNGS: The lungs are hypoexpanded with associated bronchovascular crowding. Diffuse interstitial prominence, stable as compared to prior. Stable right subsegmental atelectasis. There is no new focal consolidation. There is no pneumothorax. The left costophrenic angle is again partially obscured by the cardiomediastinal silhouette. The right costophrenic angle is clear.   ABDOMEN: No remarkable upper abdominal findings.   BONES: No acute osseous changes.       1. Interval placement of a left IJ approach cardiac pacer with tip projecting over the distal main pulmonary artery. 2. Stable mild pulmonary interstitial edema. 3. Stable right subsegmental atelectasis.   I personally reviewed the images/study and I agree with the findings as stated by Bekah Salinas MD (PGY-2). This study was interpreted at [place], [place].   MACRO: None   Signed by: Tereso Hampton 11/6/2024 4:07 PM Dictation workstation:   [id number]    XR chest 1 view    Result Date: 11/6/2024  Interpreted By:  Tereso Hampton and Omar Mahmoud STUDY: XR CHEST 1 VIEW;  11/6/2024 9:06 am   INDICATION: Signs/Symptoms:s/p TAVR.     COMPARISON: Chest x-ray 11/05/2024 1:10 p.m.   ACCESSION NUMBER(S): [id number]   ORDERING CLINICIAN: WILI LIVE   FINDINGS: AP radiograph of the chest was provided.   Postoperative findings status post TAVR. Inferior approach cardiac pacing wire is retracted compared to prior with distal radiopaque markers now projecting over the midline.   CARDIOMEDIASTINAL SILHOUETTE: Cardiomediastinal silhouette is stable in size and configuration.   LUNGS: Diffuse interstitial prominence, grossly stable as compared to prior. Right basilar subsegmental atelectasis, mildly decreased as compared to prior. The right costophrenic angle is clear. The left costophrenic angle is obscured by an enlarged  cardiomediastinal silhouette. There is no new focal consolidation. There is no pneumothorax.   ABDOMEN: No remarkable upper abdominal findings.   BONES: No acute osseous changes.       1. Interval retraction of an inferior approach cardiac pacing wire. 2. Stable mild pulmonary interstitial edema. 3. Right subsegmental basilar atelectasis is mildly decreased.   I personally reviewed the images/study and I agree with the findings as stated by Bekah Salinas MD (PGY-2). This study was interpreted at [place], [place].   MACRO: None   Signed by: Tereso Hampton 11/6/2024 4:05 PM Dictation workstation:   [id number]    Transthoracic Echo (TTE) Limited    Result Date: 11/6/2024   [facility], [address] [place] [address]                Tel [phone] and Fax [phone] TRANSTHORACIC ECHOCARDIOGRAM REPORT  Patient Name:       JUDY SAGASTUME      Reading Physician:    [id number] Thu Solis MD Study Date:         11/6/2024           Ordering Provider:    [id number] WILI LIVE MRN/PID:            [id number]            Fellow: Accession#:         [id number]        Nurse: Date of Birth/Age:  1955 / 69      Sonographer:          Tati guido                                     RDANNA Gender assigned at  F                   Additional Staff: Birth: Height:             162.56 cm           Admit Date:           11/5/2024 Weight:             123.38 kg           Admission Status:     Inpatient -                                                               Routine BSA / BMI:          2.23 m2 / 46.69     Encounter#:           [id number]                     kg/m2 Blood Pressure:     131/98 mmHg         Department Location:  [facility] Study Type:    TRANSTHORACIC ECHO (TTE) LIMITED Diagnosis/ICD: Presence of  prosthetic heart valve-Z95.2 Indication:    s/p TAVR CPT Code:      Echo Limited-18627 Patient History: Valve Disorders: Aortic Valve Replacement. Diabetes:        Yes Study Detail: The following Echo studies were performed: 2D, M-Mode, Doppler and               color flow. Definity used as a contrast agent for endocardial               border definition. Total contrast used for this procedure was 1 mL               via IV push.  PHYSICIAN INTERPRETATION: Left Ventricle: Left ventricular ejection fraction is mildly decreased, calculated by Daniels's biplane at 52%. There are no regional left ventricular wall motion abnormalities. The left ventricular cavity size is normal. Left ventricular diastolic filling cannot be determined, due to atrial fibrillation/flutter. Left Atrium: The left atrium was not assessed. Right Ventricle: The right ventricle was not well visualized. There is reduced right ventricular systolic function. A device is visualized in the right ventricle. Right Atrium: The right atrium was not well visualized. There is a device visualized in the right atrium. Aortic Valve: There is a prosthetic aortic valve present. The aortic valve dimensionless index is 0.40. There is a Medtronic transcatheter aortic valve bioprosthesis with a 29 mm reported size. There is mild aortic valve regurgitation. The peak instantaneous gradient of the aortic valve is 23 mmHg. The mean gradient of the aortic valve is 11 mmHg. S/p 29mm Medtronic Evolut FX+ TAVR with gradients of 23/11mmHg and mild paravalvlar AI. ( NOte4 gradinets and VTI averaged due to AFib). Mitral Valve: The mitral valve is normal in structure. There is moderate mitral annular calcification. There is trace mitral valve regurgitation. Tricuspid Valve: The tricuspid valve is structurally normal. There is mild tricuspid regurgitation. The Doppler estimated RVSP is moderately elevated at 53.9 mmHg. Pulmonic Valve: The pulmonic valve is not well visualized.  There is physiologic pulmonic valve regurgitation. Pericardium: Trivial pericardial effusion. Aorta: The aortic root is normal. Systemic Veins: The inferior vena cava appears dilated, with IVC inspiratory collapse less than 50%. Line noted within the IVC. In comparison to the previous echocardiogram(s): Compared with study dated 11/5/2024,. Compared with the periprocedural echo from 11/5/2024 the post TAVR gradients were11/5mmHg and are higher today. There was already mild perivalvular AI at that time. LVEF could not be seen on the prior exam.  CONCLUSIONS:  1. Poorly visualized anatomical structures due to suboptimal image quality.  2. Left ventricular ejection fraction is mildly decreased, calculated by Daniels's biplane at 52%.  3. Left ventricular diastolic filling cannot be determined, due to atrial fibrillation/flutter.  4. There is reduced right ventricular systolic function.  5. There is moderate mitral annular calcification.  6. Moderately elevated right ventricular systolic pressure.  7. S/p 29mm Medtronic Evolut FX+ TAVR with gradients of 23/11mmHg and mild paravalvlar AI. ( NOte4 gradinets and VTI averaged due to AFib).  8. There is a Medtronic transcatheter aortic valve bioprosthesis with a 29 mm reported size.  9. Mild aortic valve regurgitation. 10. Compared with the periprocedural echo from 11/5/2024 the post TAVR gradients were11/5mmHg and are higher today. There was already mild perivalvular AI at that time. LVEF could not be seen on the prior exam. RECOMMENDATIONS: Utilizing an FDA cleared automated machine learning algorithm (EchoGo Heart Failure by [employer]), the analysis of the apical 4-chamber echocardiogram suggests the presence of heart failure with preserved ejection fraction (HFpEF)*. Clinical correlation looking for additional heart failure signs and symptoms is recommended, as a definite diagnosis of heart failure cannot be made by imaging alone. *Per ACC/AHA/HFSA universal diagnosis of  heart failure, HFpEF is defined as 1) signs and symptoms leading to clinical diagnosis of heart failure, 2) an ejection fraction of at least 50%, and 3) evidence of elevated intra-cardiac filling pressures by echocardiography, BNP elevation, or catheterization.  QUANTITATIVE DATA SUMMARY:  2D MEASUREMENTS:          Normal Ranges: LAs:             3.50 cm  (2.7-4.0cm) IVSd:            0.90 cm  (0.6-1.1cm) LVPWd:           1.10 cm  (0.6-1.1cm) LVIDd:           5.10 cm  (3.9-5.9cm) LVIDs:           3.50 cm LV Mass Index:   84 g/m2 LVEDV Index:     68 ml/m2 LV % FS          31.4 %  AORTA MEASUREMENTS:         Normal Ranges: Ao Sinus, d:        2.40 cm (2.1-3.5cm) Asc Ao, d:          2.80 cm (2.1-3.4cm)  LV SYSTOLIC FUNCTION BY 2D PLANIMETRY (MOD):                      Normal Ranges: EF-A4C View:    51 % (>=55%) EF-A2C View:    54 % EF-Biplane:     52 % LV EF Reported: 52 %  LV DIASTOLIC FUNCTION:           Normal Ranges: MV Peak E:             1.12 m/s  (0.7-1.2 m/s) MV e'                  0.088 m/s (>8.0) MV lateral e'          0.10 m/s MV medial e'           0.08 m/s E/e' Ratio:            12.77     (<8.0)  MITRAL VALVE:          Normal Ranges: MV DT:        225 msec (150-240msec)  AORTIC VALVE:                      Normal Ranges: AoV Vmax:                2.38 m/s  (<=1.7m/s) AoV Peak P.7 mmHg (<20mmHg) AoV Mean P.0 mmHg (1.7-11.5mmHg) LVOT Max Elian:            1.12 m/s  (<=1.1m/s) AoV VTI:                 42.60 cm  (18-25cm) LVOT VTI:                17.20 cm LVOT Diameter:           1.90 cm   (1.8-2.4cm) AoV Area, VTI:           1.14 cm2  (2.5-5.5cm2) AoV Area,Vmax:           1.33 cm2  (2.5-4.5cm2) AoV Dimensionless Index: 0.40  RIGHT VENTRICLE: TAPSE: 20.8 mm  TRICUSPID VALVE/RVSP:          Normal Ranges: Peak TR Velocity:     3.12 m/s RV Syst Pressure:     54 mmHg  (< 30mmHg) IVC Diam:             2.80 cm  PULMONIC VALVE:          Normal Ranges: PV Max Elian:     1.2 m/s  (0.6-0.9m/s)  PV Max P.0 mmHg  [id number] Thu Solis MD Electronically signed on 2024 at 2:01:45 PM  ** Final **     Cardiac Catheterization Procedure    Result Date: 2024   [facility], Cath Lab, [address] [place] [address] Cardiovascular Catheterization Report Patient Name:      JUDY SAGASTUME       Performing Physician:  Cindy Hart MD Study Date:        2024            Verifying Physician:   Cindy Hart MD MRN/PID:           [id number]             Cardiologist/Co-Scrub: Accession#:        [id number]         Ordering Provider:     Cindy HART Date of Birth/Age: 1955 /  years Cardiologist: Gender:            F                    Fellow:                [id number] Rui Prakash MD Encounter#:        [id number]           Surgeon:               Corey Trinidad MD  Study: EBONY - Transcatheter Aortic Valve Implantation  Indications: Severe aortic stenosis.  Transcatheter Aortic Valve Replacement (TAVR): The right femoral artery was accessed using the transfemoral method. A 6 Fr sheath was inserted followed by the deployment of 2 Proglides. A 16 F Sentrant Sheath was inserted and sutured. The left femoral artery was accessed percutaneously and a 6 [place] contralateral sheath was placed. A 7 [place] temporary pacemaker was inserted through the left femoral vein and advanced to the right ventricular apex. Adequate pacing thresholds were obtained. After crossing the stenotic aortic valve, balloon aortic valvuloplasty was performed with a 21 x 45 mm True Dilatation. Evolut FX+ 29 mm valve was successfully deployed under rapid ventricular  pacing at 160 BPM. Transthoracic echo performed post valve deployment revealed no central aortic insufficiency and mild paravalvular aortic insufficiency. No device related events. No bleeding events occurred during the procedure. No vascular access complications were revealed. Access site was closed using 2 ProGlide devices. Hemostasis was achieved in the left femoral artery using a ProGlide device. Temporary pacing wire was sutured in place.  Hemo Personnel: +-----------------------+---------+ Name                   Duty      +-----------------------+---------+ Lloyd Hart MDPROC MD 1 +-----------------------+---------+  Hemodynamic Pressures:  +----+---------------+----------+-------------+--------------+-------+---------+ Site   Date Time   Phase NameSystolic mmHgDiastolic mmHgED mmHgMean mmHg +----+---------------+----------+-------------+--------------+-------+---------+  [facility]      11/5/2024      Rest          138            57              83         10:37:40 AM                                                      +----+---------------+----------+-------------+--------------+-------+---------+  LFA      11/5/2024      Rest          142            58              84         10:37:43 AM                                                      +----+---------------+----------+-------------+--------------+-------+---------+   LV      11/5/2024      Rest          168            13     20                  10:59:29 AM                                                      +----+---------------+----------+-------------+--------------+-------+---------+   AO      11/5/2024      Rest          108            43              61         10:59:29 AM                                                      +----+---------------+----------+-------------+--------------+-------+---------+   LV      11/5/2024      Rest          110            45      55                  11:03:55 AM                                                      +----+---------------+----------+-------------+--------------+-------+---------+   AO      11/5/2024      Rest          101            39              59         11:03:55 AM                                                      +----+---------------+----------+-------------+--------------+-------+---------+   AO      11/5/2024      Rest          144            63              90         11:50:44 AM                                                      +----+---------------+----------+-------------+--------------+-------+---------+   LV      11/5/2024      Rest          133             7     23                  11:50:44 AM                                                      +----+---------------+----------+-------------+--------------+-------+---------+   AO      11/5/2024      Rest          146            61              91         11:50:53 AM                                                      +----+---------------+----------+-------------+--------------+-------+---------+   LV      11/5/2024      Rest          141             7     22                  11:50:53 AM                                                      +----+---------------+----------+-------------+--------------+-------+---------+  Complications: No vascular access complications were revealed. No bleeding events occurred during the procedure. No device related events.  Cardiac Cath Post Procedure Notes: Post Procedure Diagnosis: S/p successful TAVR with Evolut FX+ 29mm. Blood Loss:               Estimated blood loss during the procedure was 15 mls. Specimens Removed:        Number of specimen(s) removed: none. ____________________________________________________________________________________ CONCLUSIONS:  1. Transcatheter aortic valve replacement with successful implantation  of Evolut FX+ 29 mm valve.  2. Patient was enrolled in a research study and data was included in the TVT Registry. ICD 10 Codes: Nonrheumatic aortic (valve) stenosis-I35.0  CPT Codes: EBONY Perc,femoral-88982.62  [id number] Lloyd Hart MD Performing Physician Electronically signed by [id number] Lloyd Hart MD on 11/6/2024 at 9:07:37 AM  ** Final **     XR chest 1 view    Result Date: 11/5/2024  Interpreted By:  Tereso Hampton and Omar Mahmoud STUDY: XR CHEST 1 VIEW;  11/5/2024 1:10 pm   INDICATION: Signs/Symptoms:s/p TAVR, TVP.     COMPARISON: Chest x-ray 10/28/2024   ACCESSION NUMBER(S): [id number]   ORDERING CLINICIAN: THUY PATTERSON   FINDINGS: AP radiograph of the chest was provided.   Interval placement of a sheath projecting over the expected location of the aortic valve in the proximal ascending aorta. Placement of a inferior approach cardiac pacing wire.   CARDIOMEDIASTINAL SILHOUETTE: Cardiomediastinal silhouette is stable in size and configuration when accounting for differences in imaging projection.   LUNGS: The lungs are hypoexpanded with associated bronchovascular crowding, new as compared to prior. There is perihilar and bibasilar predominant interstitial prominence, increased as compared to prior. The right costophrenic angle is clear. The left costophrenic angle is obscured by an enlarged cardiomediastinal silhouette. Right basilar atelectasis is more pronounced as compared to prior. There is no pneumothorax.   ABDOMEN: No remarkable upper abdominal findings.   BONES: No acute osseous changes.       1. Interval postoperative changes from TAVR and placement of an inferior approach cardiac pacer wire. 2. Increase in pulmonary interstitial edema. 3. Hypoexpanded lungs with right basilar atelectasis.   I personally reviewed the images/study and I agree with the findings as stated by Bekah Salinas MD (PGY-2). This study was interpreted at [facility],  [place], [place].   MACRO: None   Signed by: Tereso Hampton 11/5/2024 3:32 PM Dictation workstation:   [id number]    Transthoracic Echo (TTE) Limited    Result Date: 11/5/2024   [facility], [address] [place] [address]                Tel [phone] and Fax [phone] TRANSTHORACIC ECHOCARDIOGRAM REPORT  Patient Name:       JUDY SAGASTUME      Reading Physician:    [id number] Thu Solis MD Study Date:         11/5/2024           Ordering Provider:    [id number] LIVE MRN/PID:            [id number]            Fellow: Accession#:         [id number]        Nurse: Date of Birth/Age:  1955 / 69      Sonographer:          Zee Sethi RDCS                     years Gender assigned at  F                   Additional Staff: Birth: Height:             162.56 cm           Admit Date:           10/28/2024 Weight:             120.66 kg           Admission Status:     Inpatient -                                                               Routine BSA / BMI:          2.21 m2 / 45.66     Encounter#:           [id number]                     kg/m2 Blood Pressure:     93/60 mmHg          Department Location:  [facility]                                                               Cath Lab Study Type:    TRANSTHORACIC ECHO (TTE) LIMITED Diagnosis/ICD: Nonrheumatic aortic (valve) stenosis-I35.0 Indication:    TAVR Periprocedure CPT Code:      Echo Limited-51461; Color Doppler-07836; Doppler Limited-07773 Patient History: Pertinent History: CAD. HFrEF 31%, severe AS, ESRD on HD, lymphedema, chronic                    respiratory failure, obesity. Study Detail: The following Echo studies were performed: 2D, M-Mode, Doppler and               color flow. Technically challenging study due to patient lying in               supine position.  PHYSICIAN INTERPRETATION: Left  Ventricle: The left ventricle was not well visualized. The left ventricular ejection fraction could not be measured. The left ventricular cavity size was not assessed. Left ventricular diastolic filling was not assessed. Left Atrium: The left atrium is enlarged. Right Ventricle: The right ventricle was not well visualized. Right ventricular systolic function not assessed. Right Atrium: The right atrium was not well visualized. Aortic Valve: There is a prosthetic aortic valve present. There is a Medtronic transcatheter aortic valve bioprosthesis with a 29 mm reported size. There is mild aortic valve regurgitation. S/p 29mm Medtronic Evolut FX TAVR wtih gradients of 11/5mmHg and mild perivalvular AI. Mitral Valve: The mitral valve is normal in structure. There is moderate mitral annular calcification. There is mild mitral valve regurgitation. Tricuspid Valve: The tricuspid valve was not well visualized. Tricuspid regurgitation was not assessed. Pulmonic Valve: The pulmonic valve is not well visualized. There is physiologic pulmonic valve regurgitation. Pericardium: Trivial pericardial effusion. Aorta: The aortic root was not assessed. In comparison to the previous echocardiogram(s): Compared with the prior exam from 10/29/2024, there has been interval placement of a 29 Evolut FX TAVR. Prior exam showed severe calcific AS with gradients of 64/38.4mmHg with DI of 0.19. The prior LVEF was 31% with moderate global LV systolic dysfunction. LVE function not well seen today.  Post Transcatheter Aortic Valve Placement (TAVR): The peak instantaneous gradient of the aortic valve is 11.0 mmHg. The mean gradient of the aortic valve is 5.0 mmHg. There is a Medtronic Evolut FX+ transcatheter aortic valve replacement, with a 29 mm reported size.  CONCLUSIONS:  1. There is moderate mitral annular calcification.  2. S/p 29mm Medtronic Evolut FX TAVR wtih gradients of 11/5mmHg and mild perivalvular AI.  3. Poorly visualized anatomical  structures due to suboptimal image quality.  4. The left atrium is enlarged.  5. The left ventricle was not well visualized. The left ventricular ejection fraction could not be measured.  6. Limited study with images performed post TAVR placement.  7. Compared with the prior exam from 10/29/2024, there has been interval placement of a 29 Evolut FX TAVR. Prior exam showed severe calcific AS with gradients of 64/38.4mmHg with DI of 0.19. The prior LVEF was 31% with moderate global LV systolic dysfunction. LVE function not well seen today. QUANTITATIVE DATA SUMMARY:  AORTIC VALVE:                                Normal Ranges: AoV Vmax Post TAVR:                1.66 m/s  (<=1.7m/s) AoV Peak PG Post TAVR:             11.0 mmHg (<20mmHg) AoV Mean PG Post TAVR:             5.0 mmHg  (1.7-11.5mmHg) LVOT Max Elian Post TAVR:            0.70 m/s  (<=1.1m/s) AoV VTI Post TAVR:                 24.60 cm  (18-25cm) LVOT VTI Post TAVR:                10.50 cm LVOT Diameter Post TAVR:           2.26 cm   (1.8-2.4cm) AoV Area, VTI Post TAVR:           1.71 cm2  (2.5-5.5cm2) AoV Area,Vmax Post TAVR:           1.69 cm2  (2.5-4.5cm2) AoV Dimensionless Index Post TAVR: 0.43  [id number] Thu Solis MD Electronically signed on 11/5/2024 at 3:05:24 PM  ** Final **     ECG 12 lead    Result Date: 11/5/2024  Atrial fibrillation with slow ventricular response with frequent ventricular-paced complexes Nonspecific intraventricular conduction delay ST & T wave abnormality, consider lateral ischemia Abnormal ECG When compared with ECG of 30-OCT-2024 21:48, Electronic ventricular pacemaker has replaced Atrial fibrillation     Assessment/Plan:  Faviola Pleitez is a 69 y.o. female with PMHx of diabetic ESRD on T/Th/S HD with left arm fistula, CAD, s/p PCI to LAD in 2015, then s/p PCI to LM and mid and proximal LAD with double stenting 11/01/2024, lymphedema c/b recurrent cellulitis of LE, AoCD, CHRF of unclear etiology (on 3L NC baseline), DM2, HLD, HTN,  recently diagnosed HFmrEF at 31% and severe AS, and afib, on Eliquis. She is now s/p successful TAVR with Evolut FX+ 29 mm 11/5. Transferred to CICU for post-procedural management. Had TVP pulled out with patient coding thereafter, getting ROSC after 5-7 min and then pacing transcutaneously. TVP was emergently placed back into the patient. Later in the day complicated by evolving hematoma in LE with loss of pulses.    NEURO:  #Pain control  - PRN Tylenol  - One time dose of dilaudid given 0.2mg early in day  - given pain with chest compressions and with replacement of TVP and hematoma - started on fentanyl drip     CARDS:  #CARDIAC ARREST - 11/6  TVP was removed around Noon for patient to be stepped down. After pulling the TVP, patient was okay but after 1 min she went into asystole. Compressed patient for 6-8 minutes and gave one dose of epinephrine. Was transcutaneously paced, and emergently taken to cath lab for replacement of TVP.  After placement of recurrent TVP patient lost capture after coming to the CICU and gave 2 compressions. She also became hypotensive and then dropped her pressures for which levophed and dopamine were started. She was intubated as she was vomiting fluids. Arterial line was placed and thereafter patient started to develop a hematoma in the groin for which pressure was applied. Monitoring for hematoma expansion.  - Started levophed, dopamine, and vasopressin drips to titrate MAP>65  - Started Fentanyl and versed drips  - Family updated and discussed care with them    #Severe AS, s/p successful TAVR with Evolut FX+ 29 mm 11/5  #Heart block  TTE 10/29/24: EF 31%, GHK, reduced RV systolic function, LA mildly dilated, mod mitral annular calcification, mod elevated RVSP, Severe calcific AS with gradients of 64/38.4mmHg and DI of 0.19 and mild AI. ( Note the gradients and VTI were averaged over 5 consecutive beats given atrial fibrillation ) consistent with severe aortic stenosis, severe AV  cusp calc, mild AR. S/p successful TAVR with Evolut FX+ 29 mm 11/5. Left CFV TVP placed as well for transient heart block. Pre LVEDP: 20 mmHg. Pst LVEDP:  22 mmHg. Post gradient TTE: 5 mmHg.  - Repeat TTE 11/6 - LV EF low normal at 55%, mildly enlarged RV, low normal RVSF, Evolut transcatheter aortic valve bioprosthesis  - Plan for permanent pacemaker placement 11/7 given hematoma at access site     #New HFmrEF, 31%  #Acute systolic and diastolic heart failure, decompensated  #CAD s/p PCI, 11/1/24  TTE report as above - global hypokinesis. BNP 1284 on admission. [facility] 11/1: Culprit vessel(s): left anterior descending. OCT guided successful PCI to LM and mid and proximal LAD with double stenting. Successful DCB for in stent restenosis of LAD stents. Lcx 50% ostial stenosis.  - New HF workup ordered 11/5              > Likely ICM  - Volume management with HD - nephrology managing  - GDMT limited due to ESRD on HD  - DAPT with ASA and stain   > NOT CURRENTLY ON AC - to be determined by structural heart team  - Atorva 40 mg daily     #Newly diagnosed afib  10/25/24 at OSH.  - Holding anticoagulation given hematoma at access site  - Amiodarone 200mg daily     #PULM  #CHRF, unclear etiology, possible COPD?  #Rhinovirus positive at OSH, resolved  #Pulmonary nodules, incidental finding  CT TAVR: Multiple solid non-calcified pulmonary nodules measuring up to 7 mm. In absence of prior comparative examinations, to ensure stability, consider follow up non contrast chest CT at 3-6 months. Completed prednisone taper per OSH recs during this [facility] admission.  - Baseline 3L NC  - Breo-Ellipta daily     NEPHRO:  #Diabetic ESRD on HD T, Th, Sat via left arm fistula  S/p HD 10/29, 10/31, 11/2, 11/4.  - Midodrine 5 mg TID     HEME:  #Anemia of chronic disease  Hemoglobin 10.7 to 9.7 s/p TAVR  - CTM  - Consider IV iron     ID:  #Acute Cellulitis  #Poor nail integrity  #Hx lymphedema  Appears to have completed a course of Levaquin through  "10/30 (initiated at OSH), and also \"recently completed metronidazole course,\" again, at OSH. No culture data. Ongoing cellulitis on exam 11/5, with bullae.  - Vancomycin was not started at this point  - Wound care following - recommendations of daily cleaning at this time  - Consider podiatry consult     ENDO:  #DM2  A1c 7 in August. Repeat 11/5 of 7.3  - Continue glucommander     DVT prophylaxis: SCDs     Code Status: Full Code (confirmed on admission)   NOK: Extended Emergency Contact Information  Primary Emergency Contact: Susie Sagastume - primary decision maker  Mobile Phone: [phone]  Relation: Daughter  Secondary Emergency Contact: SINDHU SAGASTUME - give updates to  Mobile Phone: [phone]  Relation: Son      Patient was seen, staffed, and evaluated with Dr. Nika Rondon MD  [facility]/ Internal Medicine PGY-2      Assessment & Plan  Rhinovirus    S/P TAVR (transcatheter aortic valve replacement)    Complete heart block      "

## 2024-11-06 NOTE — NURSING NOTE
1300- ICU fellow and resident     1304- This nurse at bedside, patient unresponsive, no pulse noted. CPR started, code blue called.    1306-1mg epi given. Pulse check complete, ROSC, SB HR 50.    1307- Dopamine start at 10mg concentration 400ng/250ml.    1311- Pacing patient 50 BPM.    1314- 50mcg fent given IVP.     1345- Patient off floor with ICU team to IR for emergent TVP. Hand off given to IR team.    1510- Patient back to ICU 18. EP fellow at bedside, Per MD TVP marked at 30 cm.    1525- This nurse in patients room, patient BP 64/34 map 43, levo increased see epic. Artline zeroed and flushed.     1535- Patient lips and face cyanotic, rescue breaths started. Patient cyanosis resolved with rescue breaths.  RT and ICU team at bedside.    1538- ICU team preparing to intubate for airway protection. 20 etomidate given, 100mg elias given. ET placement verified with color change, breath sounds present. Xray order and called for tube verification.        1630- Patient pulse ox 87-89, 100% oxy boost given, patient suctioned. O2 improved to 93% RT made aware. ABG SpO2 98%. ICU team aware Spo2 on monitor is not correlating with labs. No new orders recieven. This nurse attempted to doppler at LLE DP, RLE DP, L Pop, unable to doppler. ICU team at bedside attempting to doppler pulse.     1703- Patient bp 59/47 map 52, levo increased, see epic and Vaso 0.03mg started, verbal order received from fellow at bedside. Artline placed.     1735- Dr. Florence at bedside, plan to not move patient throughout night, in  order to achieve hemostasis.

## 2024-11-06 NOTE — SIGNIFICANT EVENT
Given patient developed acute femoral hematoma and possible cardioembolism into her L foot in the setting of interrupted AC for the last few days, EP will plan Medtronic Micra leadless PPM implantation tomorrow.    Betsy Garcias MD  Clinical Cardiac Electrophysiology Fellow

## 2024-11-06 NOTE — PROGRESS NOTES
Vancomycin Dosing by Pharmacy- FOLLOW UP    Faviola Pleitez is a 69 y.o. year old female who Pharmacy has been consulted for vancomycin dosing for cellulitis, skin and soft tissue. Based on the patient's indication and renal status this patient is being dosed based on a goal pre-HD level of 20-25.     On HD Tuesday, Thursday, Saturday. Nephrology following inpatient.    Per MAR, patient received vancomycin 1 g on     Most recent random level: 11 mcg/mL on  AM    Visit Vitals  BP (!) 101/48   Pulse 80   Temp 35.7 °C (96.3 °F)   Resp 16        Lab Results   Component Value Date    CREATININE 5.42 (H) 2024    CREATININE 4.50 (H) 2024    CREATININE 4.13 (H) 2024    CREATININE 5.51 (H) 2024        Patient weight is as follows:   Vitals:    24 0000   Weight: 123 kg (272 lb)       Cultures:  No results found for the encounter in last 14 days.       I/O last 3 completed shifts:  In: 240 (1.9 mL/kg) [P.O.:240]  Out: 30 (0.2 mL/kg) [Blood:30]  Weight: 123.4 kg   I/O during current shift:  I/O this shift:  In: -   Out: 100 [Emesis/NG output:100]    Temp (24hrs), Av.7 °C (96.3 °F), Min:35.2 °C (95.4 °F), Max:36 °C (96.8 °F)      Assessment/Plan    Will give another 1 g dose of vancomycin  and obtain level  AM prior to HD.   Will continue to monitor renal function daily while on vancomycin and order serum creatinine at least every 48 hours if not already ordered.  Follow for continued vancomycin needs, clinical response, and signs/symptoms of toxicity.       David Menendez PharmD       1g dose not administered. Pt transferred to CICU where 2 hour emergency HD session ordered by nephrology. Dc'd 1g dose. Ordered 1.5g once post HD (level this AM was 11).     Follow renal plans to order future levels.     Donna Fritz, [place], [facility]

## 2024-11-06 NOTE — PROCEDURES
Airway  Date/Time: 11/6/2024 3:42 PM  Urgency: emergent    Airway not difficult    Staffing  Performed: resident   Authorized by: Sandi Bazan MD    Performed by: Tereso Joyce MD  Patient location during procedure: ICU    Consent for Airway (if performed for an anesthetic, see related documentation for consents)  Patient identity confirmed: verbally with patient, arm band, provided demographic data and hospital-assigned identification number  Consent: No emergent situation. Verbal consent obtained.  Risks and benefits: risks, benefits and alternatives were discussed  Consent given by: patient      Indications and Patient Condition  Indications for airway management: cardiovascular instability  Spontaneous ventilation: present  Sedation level: deep  Preoxygenated: yes  Patient position: sniffing  Mask difficulty assessment: 2 - vent by mask + OA or adjuvant +/- NMBA    Final Airway Details  Final airway type: endotracheal airway      Successful airway: ETT  Cuffed: yes   Successful intubation technique: video laryngoscopy  Facilitating devices/methods: intubating stylet  Endotracheal tube insertion site: oral  Blade: Mariajose  Blade size: #3  ETT size (mm): 7.0  Cormack-Lehane Classification: grade I - full view of glottis  Placement verified by: chest auscultation and capnometry   Measured from: lips  ETT to lips (cm): 23  Number of attempts at approach: 1  Number of other approaches attempted: 0    Additional Comments  Intubation Procedure Summary    The patient was placed in supine position.  The patient remained on appropriate monitors with audible pulse oximetry. Patient was pre-oxygenated via bag-mask. Suction and emergency equipment readily available.  The patient was sedated with 20 mg etomidate and neuromuscularly blocked with 100 mg rocuronium.  A glidescope s3 blade was inserted into the oropharynx.  The Cords were visualized, grade 1 view. A 7.0 ET tube placed, passed to 23 cm at lips,  the cuff inflated.  Placement confirmed with positive ETCO2, equal bilateral breath sounds.  Post procedure chest x-ray pending. The patient tolerated the procedure well without any complications. Sedation to be provided by ICU team.

## 2024-11-06 NOTE — PROGRESS NOTES
Social Work Progress Note  - ICU TREATMENT PLAN: Patient was transferred to CICU s/p TAVR.   - Payer: Medicare, Medicaid  -Support System: Daughter, son.  - Planned Disposition: Pending medical outcome and rehab recommendations.   - Barriers to discharge: None at this time. SW will continue to follow.  ANDRES LEE

## 2024-11-07 PROBLEM — T14.8XXA HEMATOMA: Status: ACTIVE | Noted: 2024-01-01

## 2024-11-07 PROBLEM — I25.10 CORONARY ARTERY DISEASE: Status: ACTIVE | Noted: 2024-01-01

## 2024-11-07 PROBLEM — I50.21 ACUTE SYSTOLIC HF (HEART FAILURE): Status: ACTIVE | Noted: 2024-01-01

## 2024-11-07 PROBLEM — E11.9 TYPE 2 DIABETES MELLITUS: Status: ACTIVE | Noted: 2024-01-01

## 2024-11-07 PROBLEM — L03.90 CELLULITIS: Status: ACTIVE | Noted: 2024-01-01

## 2024-11-07 PROBLEM — J69.0 ASPIRATION PNEUMONIA (MULTI): Status: ACTIVE | Noted: 2024-01-01

## 2024-11-07 PROBLEM — J96.01 ACUTE HYPOXIC RESPIRATORY FAILURE (MULTI): Status: ACTIVE | Noted: 2024-01-01

## 2024-11-07 PROBLEM — D69.6 THROMBOCYTOPENIA (CMS-HCC): Status: ACTIVE | Noted: 2024-01-01

## 2024-11-07 PROBLEM — I46.9 CARDIAC ARREST: Status: ACTIVE | Noted: 2024-01-01

## 2024-11-07 PROBLEM — E87.5 HYPERKALEMIA: Status: ACTIVE | Noted: 2024-01-01

## 2024-11-07 PROBLEM — D63.8 ANEMIA OF CHRONIC DISEASE: Status: ACTIVE | Noted: 2024-01-01

## 2024-11-07 PROBLEM — N18.6 ESRD (END STAGE RENAL DISEASE) (MULTI): Status: ACTIVE | Noted: 2024-01-01

## 2024-11-07 PROBLEM — I48.91 A-FIB (MULTI): Status: ACTIVE | Noted: 2024-01-01

## 2024-11-07 LAB
ACT BLD: 286 SEC (ref 89–169)
ALBUMIN SERPL BCP-MCNC: 2.7 G/DL (ref 3.4–5)
ALBUMIN SERPL BCP-MCNC: 2.7 G/DL (ref 3.4–5)
ALBUMIN SERPL BCP-MCNC: 2.9 G/DL (ref 3.4–5)
ALBUMIN SERPL BCP-MCNC: 3 G/DL (ref 3.4–5)
ALBUMIN SERPL BCP-MCNC: 3.1 G/DL (ref 3.4–5)
ANION GAP BLDA CALCULATED.4IONS-SCNC: 15 MMO/L (ref 10–25)
ANION GAP BLDA CALCULATED.4IONS-SCNC: 16 MMO/L (ref 10–25)
ANION GAP BLDA CALCULATED.4IONS-SCNC: 16 MMO/L (ref 10–25)
ANION GAP BLDA CALCULATED.4IONS-SCNC: 17 MMO/L (ref 10–25)
ANION GAP BLDA CALCULATED.4IONS-SCNC: 17 MMO/L (ref 10–25)
ANION GAP SERPL CALC-SCNC: 17 MMOL/L (ref 10–20)
ANION GAP SERPL CALC-SCNC: 20 MMOL/L (ref 10–20)
ANION GAP SERPL CALC-SCNC: 21 MMOL/L (ref 10–20)
ANION GAP SERPL CALC-SCNC: 21 MMOL/L (ref 10–20)
ANION GAP SERPL CALC-SCNC: 22 MMOL/L (ref 10–20)
AORTIC VALVE MEAN GRADIENT: 11 MMHG
AORTIC VALVE PEAK VELOCITY: 2.38 M/S
ATRIAL RATE: 101 BPM
ATRIAL RATE: 90 BPM
AV PEAK GRADIENT: 23 MMHG
AVA (PEAK VEL): 1.33 CM2
AVA (VTI): 1.14 CM2
BASE EXCESS BLDA CALC-SCNC: -4 MMOL/L (ref -2–3)
BASE EXCESS BLDA CALC-SCNC: -4.4 MMOL/L (ref -2–3)
BASE EXCESS BLDA CALC-SCNC: -5 MMOL/L (ref -2–3)
BASE EXCESS BLDA CALC-SCNC: -5.5 MMOL/L (ref -2–3)
BASE EXCESS BLDA CALC-SCNC: -5.7 MMOL/L (ref -2–3)
BASOPHILS # BLD AUTO: 0.02 X10*3/UL (ref 0–0.1)
BASOPHILS # BLD AUTO: 0.03 X10*3/UL (ref 0–0.1)
BASOPHILS # BLD AUTO: 0.03 X10*3/UL (ref 0–0.1)
BASOPHILS # BLD AUTO: 0.05 X10*3/UL (ref 0–0.1)
BASOPHILS NFR BLD AUTO: 0.1 %
BASOPHILS NFR BLD AUTO: 0.1 %
BASOPHILS NFR BLD AUTO: 0.2 %
BASOPHILS NFR BLD AUTO: 0.3 %
BLOOD EXPIRATION DATE: NORMAL
BODY SURFACE AREA: 2.36 M2
BODY TEMPERATURE: 37 DEGREES CELSIUS
BUN SERPL-MCNC: 43 MG/DL (ref 6–23)
BUN SERPL-MCNC: 44 MG/DL (ref 6–23)
BUN SERPL-MCNC: 46 MG/DL (ref 6–23)
BUN SERPL-MCNC: 50 MG/DL (ref 6–23)
BUN SERPL-MCNC: 51 MG/DL (ref 6–23)
CA-I BLDA-SCNC: 1.03 MMOL/L (ref 1.1–1.33)
CA-I BLDA-SCNC: 1.08 MMOL/L (ref 1.1–1.33)
CA-I BLDA-SCNC: 1.13 MMOL/L (ref 1.1–1.33)
CA-I BLDA-SCNC: 1.13 MMOL/L (ref 1.1–1.33)
CA-I BLDA-SCNC: 1.15 MMOL/L (ref 1.1–1.33)
CALCIUM SERPL-MCNC: 8 MG/DL (ref 8.6–10.6)
CALCIUM SERPL-MCNC: 8.2 MG/DL (ref 8.6–10.6)
CALCIUM SERPL-MCNC: 8.4 MG/DL (ref 8.6–10.6)
CALCIUM SERPL-MCNC: 8.5 MG/DL (ref 8.6–10.6)
CALCIUM SERPL-MCNC: 8.6 MG/DL (ref 8.6–10.6)
CHLORIDE BLDA-SCNC: 100 MMOL/L (ref 98–107)
CHLORIDE BLDA-SCNC: 103 MMOL/L (ref 98–107)
CHLORIDE BLDA-SCNC: 99 MMOL/L (ref 98–107)
CHLORIDE SERPL-SCNC: 97 MMOL/L (ref 98–107)
CHLORIDE SERPL-SCNC: 98 MMOL/L (ref 98–107)
CO2 SERPL-SCNC: 22 MMOL/L (ref 21–32)
CO2 SERPL-SCNC: 22 MMOL/L (ref 21–32)
CO2 SERPL-SCNC: 23 MMOL/L (ref 21–32)
CO2 SERPL-SCNC: 24 MMOL/L (ref 21–32)
CO2 SERPL-SCNC: 24 MMOL/L (ref 21–32)
CREAT SERPL-MCNC: 5.26 MG/DL (ref 0.5–1.05)
CREAT SERPL-MCNC: 5.52 MG/DL (ref 0.5–1.05)
CREAT SERPL-MCNC: 5.6 MG/DL (ref 0.5–1.05)
CREAT SERPL-MCNC: 6.15 MG/DL (ref 0.5–1.05)
CREAT SERPL-MCNC: 6.16 MG/DL (ref 0.5–1.05)
DISPENSE STATUS: NORMAL
EGFRCR SERPLBLD CKD-EPI 2021: 7 ML/MIN/1.73M*2
EGFRCR SERPLBLD CKD-EPI 2021: 7 ML/MIN/1.73M*2
EGFRCR SERPLBLD CKD-EPI 2021: 8 ML/MIN/1.73M*2
EJECTION FRACTION APICAL 4 CHAMBER: 50.9
EJECTION FRACTION: 52 %
EOSINOPHIL # BLD AUTO: 0.03 X10*3/UL (ref 0–0.7)
EOSINOPHIL # BLD AUTO: 0.07 X10*3/UL (ref 0–0.7)
EOSINOPHIL # BLD AUTO: 0.09 X10*3/UL (ref 0–0.7)
EOSINOPHIL # BLD AUTO: 0.13 X10*3/UL (ref 0–0.7)
EOSINOPHIL NFR BLD AUTO: 0.1 %
EOSINOPHIL NFR BLD AUTO: 0.5 %
EOSINOPHIL NFR BLD AUTO: 0.5 %
EOSINOPHIL NFR BLD AUTO: 0.8 %
ERYTHROCYTE [DISTWIDTH] IN BLOOD BY AUTOMATED COUNT: 18 % (ref 11.5–14.5)
ERYTHROCYTE [DISTWIDTH] IN BLOOD BY AUTOMATED COUNT: 18.2 % (ref 11.5–14.5)
ERYTHROCYTE [DISTWIDTH] IN BLOOD BY AUTOMATED COUNT: 18.3 % (ref 11.5–14.5)
ERYTHROCYTE [DISTWIDTH] IN BLOOD BY AUTOMATED COUNT: 18.4 % (ref 11.5–14.5)
GLUCOSE BLD MANUAL STRIP-MCNC: 110 MG/DL (ref 74–99)
GLUCOSE BLD MANUAL STRIP-MCNC: 173 MG/DL (ref 74–99)
GLUCOSE BLD MANUAL STRIP-MCNC: 187 MG/DL (ref 74–99)
GLUCOSE BLD MANUAL STRIP-MCNC: 213 MG/DL (ref 74–99)
GLUCOSE BLD MANUAL STRIP-MCNC: 222 MG/DL (ref 74–99)
GLUCOSE BLD MANUAL STRIP-MCNC: 236 MG/DL (ref 74–99)
GLUCOSE BLD MANUAL STRIP-MCNC: 246 MG/DL (ref 74–99)
GLUCOSE BLDA-MCNC: 197 MG/DL (ref 74–99)
GLUCOSE BLDA-MCNC: 206 MG/DL (ref 74–99)
GLUCOSE BLDA-MCNC: 216 MG/DL (ref 74–99)
GLUCOSE BLDA-MCNC: 222 MG/DL (ref 74–99)
GLUCOSE BLDA-MCNC: 225 MG/DL (ref 74–99)
GLUCOSE SERPL-MCNC: 162 MG/DL (ref 74–99)
GLUCOSE SERPL-MCNC: 202 MG/DL (ref 74–99)
GLUCOSE SERPL-MCNC: 220 MG/DL (ref 74–99)
GLUCOSE SERPL-MCNC: 221 MG/DL (ref 74–99)
GLUCOSE SERPL-MCNC: 229 MG/DL (ref 74–99)
GLUCOSE SERPL-MCNC: 232 MG/DL (ref 74–99)
GLUCOSE SERPL-MCNC: 232 MG/DL (ref 74–99)
HCO3 BLDA-SCNC: 17.1 MMOL/L (ref 22–26)
HCO3 BLDA-SCNC: 19.5 MMOL/L (ref 22–26)
HCO3 BLDA-SCNC: 19.8 MMOL/L (ref 22–26)
HCO3 BLDA-SCNC: 20 MMOL/L (ref 22–26)
HCO3 BLDA-SCNC: 20.8 MMOL/L (ref 22–26)
HCT VFR BLD AUTO: 26.9 % (ref 36–46)
HCT VFR BLD AUTO: 28.7 % (ref 36–46)
HCT VFR BLD AUTO: 29.2 % (ref 36–46)
HCT VFR BLD AUTO: 29.3 % (ref 36–46)
HCT VFR BLD EST: 26 % (ref 36–46)
HCT VFR BLD EST: 26 % (ref 36–46)
HCT VFR BLD EST: 27 % (ref 36–46)
HCT VFR BLD EST: 28 % (ref 36–46)
HCT VFR BLD EST: 28 % (ref 36–46)
HGB BLD-MCNC: 8.7 G/DL (ref 12–16)
HGB BLD-MCNC: 9.3 G/DL (ref 12–16)
HGB BLD-MCNC: 9.3 G/DL (ref 12–16)
HGB BLD-MCNC: 9.6 G/DL (ref 12–16)
HGB BLDA-MCNC: 8.6 G/DL (ref 12–16)
HGB BLDA-MCNC: 8.8 G/DL (ref 12–16)
HGB BLDA-MCNC: 8.9 G/DL (ref 12–16)
HGB BLDA-MCNC: 9.4 G/DL (ref 12–16)
HGB BLDA-MCNC: 9.4 G/DL (ref 12–16)
HIV-INTEGRATED COMMENT: NORMAL
HIV1 RNA # PLAS NAA DL=20: NOT DETECTED {COPIES}/ML
HIV1 RNA SPEC NAA+PROBE-LOG#: NORMAL {LOG_COPIES}/ML
IMM GRANULOCYTES # BLD AUTO: 0.13 X10*3/UL (ref 0–0.7)
IMM GRANULOCYTES # BLD AUTO: 0.15 X10*3/UL (ref 0–0.7)
IMM GRANULOCYTES # BLD AUTO: 0.2 X10*3/UL (ref 0–0.7)
IMM GRANULOCYTES # BLD AUTO: 0.28 X10*3/UL (ref 0–0.7)
IMM GRANULOCYTES NFR BLD AUTO: 0.9 % (ref 0–0.9)
IMM GRANULOCYTES NFR BLD AUTO: 0.9 % (ref 0–0.9)
IMM GRANULOCYTES NFR BLD AUTO: 1.2 % (ref 0–0.9)
IMM GRANULOCYTES NFR BLD AUTO: 1.4 % (ref 0–0.9)
INHALED O2 CONCENTRATION: 50 %
INHALED O2 CONCENTRATION: 60 %
LACTATE BLDA-SCNC: 2.3 MMOL/L (ref 0.4–2)
LACTATE BLDA-SCNC: 2.4 MMOL/L (ref 0.4–2)
LACTATE BLDA-SCNC: 2.6 MMOL/L (ref 0.4–2)
LACTATE BLDA-SCNC: 3.5 MMOL/L (ref 0.4–2)
LACTATE BLDA-SCNC: 3.9 MMOL/L (ref 0.4–2)
LACTATE BLDV-SCNC: 3.1 MMOL/L (ref 0.4–2)
LEFT VENTRICLE INTERNAL DIMENSION DIASTOLE: 5.1 CM (ref 3.5–6)
LEFT VENTRICULAR OUTFLOW TRACT DIAMETER: 1.9 CM
LYMPHOCYTES # BLD AUTO: 1.1 X10*3/UL (ref 1.2–4.8)
LYMPHOCYTES # BLD AUTO: 1.12 X10*3/UL (ref 1.2–4.8)
LYMPHOCYTES # BLD AUTO: 1.34 X10*3/UL (ref 1.2–4.8)
LYMPHOCYTES # BLD AUTO: 1.42 X10*3/UL (ref 1.2–4.8)
LYMPHOCYTES NFR BLD AUTO: 6.5 %
LYMPHOCYTES NFR BLD AUTO: 7.1 %
LYMPHOCYTES NFR BLD AUTO: 7.8 %
LYMPHOCYTES NFR BLD AUTO: 8.6 %
MAGNESIUM SERPL-MCNC: 1.94 MG/DL (ref 1.6–2.4)
MAGNESIUM SERPL-MCNC: 2.03 MG/DL (ref 1.6–2.4)
MCH RBC QN AUTO: 32 PG (ref 26–34)
MCH RBC QN AUTO: 32 PG (ref 26–34)
MCH RBC QN AUTO: 32.3 PG (ref 26–34)
MCH RBC QN AUTO: 32.6 PG (ref 26–34)
MCHC RBC AUTO-ENTMCNC: 31.7 G/DL (ref 32–36)
MCHC RBC AUTO-ENTMCNC: 32.3 G/DL (ref 32–36)
MCHC RBC AUTO-ENTMCNC: 32.4 G/DL (ref 32–36)
MCHC RBC AUTO-ENTMCNC: 32.9 G/DL (ref 32–36)
MCV RBC AUTO: 103 FL (ref 80–100)
MCV RBC AUTO: 98 FL (ref 80–100)
MCV RBC AUTO: 99 FL (ref 80–100)
MCV RBC AUTO: 99 FL (ref 80–100)
MONOCYTES # BLD AUTO: 1.23 X10*3/UL (ref 0.1–1)
MONOCYTES # BLD AUTO: 1.44 X10*3/UL (ref 0.1–1)
MONOCYTES # BLD AUTO: 1.57 X10*3/UL (ref 0.1–1)
MONOCYTES # BLD AUTO: 1.67 X10*3/UL (ref 0.1–1)
MONOCYTES NFR BLD AUTO: 10.1 %
MONOCYTES NFR BLD AUTO: 10.2 %
MONOCYTES NFR BLD AUTO: 7.6 %
MONOCYTES NFR BLD AUTO: 7.8 %
NEUTROPHILS # BLD AUTO: 11.38 X10*3/UL (ref 1.2–7.7)
NEUTROPHILS # BLD AUTO: 13.07 X10*3/UL (ref 1.2–7.7)
NEUTROPHILS # BLD AUTO: 13.15 X10*3/UL (ref 1.2–7.7)
NEUTROPHILS # BLD AUTO: 17.34 X10*3/UL (ref 1.2–7.7)
NEUTROPHILS NFR BLD AUTO: 79.5 %
NEUTROPHILS NFR BLD AUTO: 80.4 %
NEUTROPHILS NFR BLD AUTO: 83.1 %
NEUTROPHILS NFR BLD AUTO: 84.3 %
NRBC BLD-RTO: 0.4 /100 WBCS (ref 0–0)
NRBC BLD-RTO: 0.5 /100 WBCS (ref 0–0)
OXYHGB MFR BLDA: 96.6 % (ref 94–98)
OXYHGB MFR BLDA: 96.7 % (ref 94–98)
OXYHGB MFR BLDA: 96.7 % (ref 94–98)
OXYHGB MFR BLDA: 96.8 % (ref 94–98)
OXYHGB MFR BLDA: 97.4 % (ref 94–98)
PATH REVIEW-CBC DIFFERENTIAL: NORMAL
PCO2 BLDA: 24 MM HG (ref 38–42)
PCO2 BLDA: 32 MM HG (ref 38–42)
PCO2 BLDA: 33 MM HG (ref 38–42)
PCO2 BLDA: 36 MM HG (ref 38–42)
PCO2 BLDA: 38 MM HG (ref 38–42)
PH BLDA: 7.33 PH (ref 7.38–7.42)
PH BLDA: 7.37 PH (ref 7.38–7.42)
PH BLDA: 7.38 PH (ref 7.38–7.42)
PH BLDA: 7.4 PH (ref 7.38–7.42)
PH BLDA: 7.46 PH (ref 7.38–7.42)
PHOSPHATE SERPL-MCNC: 4.1 MG/DL (ref 2.5–4.9)
PHOSPHATE SERPL-MCNC: 4.3 MG/DL (ref 2.5–4.9)
PHOSPHATE SERPL-MCNC: 4.4 MG/DL (ref 2.5–4.9)
PHOSPHATE SERPL-MCNC: 4.5 MG/DL (ref 2.5–4.9)
PHOSPHATE SERPL-MCNC: 4.6 MG/DL (ref 2.5–4.9)
PLATELET # BLD AUTO: 32 X10*3/UL (ref 150–450)
PLATELET # BLD AUTO: 40 X10*3/UL (ref 150–450)
PLATELET # BLD AUTO: 42 X10*3/UL (ref 150–450)
PLATELET # BLD AUTO: 49 X10*3/UL (ref 150–450)
PO2 BLDA: 164 MM HG (ref 85–95)
PO2 BLDA: 164 MM HG (ref 85–95)
PO2 BLDA: 173 MM HG (ref 85–95)
PO2 BLDA: 180 MM HG (ref 85–95)
PO2 BLDA: 341 MM HG (ref 85–95)
POTASSIUM BLDA-SCNC: 5.3 MMOL/L (ref 3.5–5.3)
POTASSIUM BLDA-SCNC: 5.3 MMOL/L (ref 3.5–5.3)
POTASSIUM BLDA-SCNC: 5.7 MMOL/L (ref 3.5–5.3)
POTASSIUM BLDA-SCNC: 5.7 MMOL/L (ref 3.5–5.3)
POTASSIUM BLDA-SCNC: 6.3 MMOL/L (ref 3.5–5.3)
POTASSIUM SERPL-SCNC: 5.3 MMOL/L (ref 3.5–5.3)
POTASSIUM SERPL-SCNC: 5.8 MMOL/L (ref 3.5–5.3)
POTASSIUM SERPL-SCNC: 5.9 MMOL/L (ref 3.5–5.3)
POTASSIUM SERPL-SCNC: 6 MMOL/L (ref 3.5–5.3)
POTASSIUM SERPL-SCNC: 6.1 MMOL/L (ref 3.5–5.3)
PRODUCT BLOOD TYPE: 6200
PRODUCT CODE: NORMAL
Q ONSET: 194 MS
Q ONSET: 217 MS
QRS COUNT: 13 BEATS
QRS COUNT: 13 BEATS
QRS DURATION: 168 MS
QRS DURATION: 88 MS
QT INTERVAL: 418 MS
QT INTERVAL: 450 MS
QTC CALCULATION(BAZETT): 482 MS
QTC CALCULATION(BAZETT): 519 MS
QTC FREDERICIA: 460 MS
QTC FREDERICIA: 495 MS
R AXIS: -11 DEGREES
R AXIS: 58 DEGREES
RBC # BLD AUTO: 2.72 X10*6/UL (ref 4–5.2)
RBC # BLD AUTO: 2.85 X10*6/UL (ref 4–5.2)
RBC # BLD AUTO: 2.91 X10*6/UL (ref 4–5.2)
RBC # BLD AUTO: 2.97 X10*6/UL (ref 4–5.2)
RIGHT VENTRICLE PEAK SYSTOLIC PRESSURE: 53.9 MMHG
SAO2 % BLDA: 100 % (ref 94–100)
SAO2 % BLDA: 99 % (ref 94–100)
SODIUM BLDA-SCNC: 128 MMOL/L (ref 136–145)
SODIUM BLDA-SCNC: 129 MMOL/L (ref 136–145)
SODIUM BLDA-SCNC: 130 MMOL/L (ref 136–145)
SODIUM BLDA-SCNC: 131 MMOL/L (ref 136–145)
SODIUM BLDA-SCNC: 131 MMOL/L (ref 136–145)
SODIUM SERPL-SCNC: 132 MMOL/L (ref 136–145)
SODIUM SERPL-SCNC: 134 MMOL/L (ref 136–145)
SODIUM SERPL-SCNC: 135 MMOL/L (ref 136–145)
SODIUM SERPL-SCNC: 136 MMOL/L (ref 136–145)
SODIUM SERPL-SCNC: 136 MMOL/L (ref 136–145)
T AXIS: 174 DEGREES
T AXIS: 259 DEGREES
T OFFSET: 419 MS
T OFFSET: 426 MS
TRICUSPID ANNULAR PLANE SYSTOLIC EXCURSION: 2.1 CM
UNIT ABO: NORMAL
UNIT NUMBER: NORMAL
UNIT RH: NORMAL
UNIT VOLUME: 243
VENTRICULAR RATE: 80 BPM
VENTRICULAR RATE: 80 BPM
WBC # BLD AUTO: 14.2 X10*3/UL (ref 4.4–11.3)
WBC # BLD AUTO: 15.8 X10*3/UL (ref 4.4–11.3)
WBC # BLD AUTO: 16.5 X10*3/UL (ref 4.4–11.3)
WBC # BLD AUTO: 20.6 X10*3/UL (ref 4.4–11.3)

## 2024-11-07 PROCEDURE — 2500000004 HC RX 250 GENERAL PHARMACY W/ HCPCS (ALT 636 FOR OP/ED): Performed by: INTERNAL MEDICINE

## 2024-11-07 PROCEDURE — 99232 SBSQ HOSP IP/OBS MODERATE 35: CPT | Performed by: INTERNAL MEDICINE

## 2024-11-07 PROCEDURE — 36556 INSERT NON-TUNNEL CV CATH: CPT | Performed by: INTERNAL MEDICINE

## 2024-11-07 PROCEDURE — 93010 ELECTROCARDIOGRAM REPORT: CPT | Performed by: INTERNAL MEDICINE

## 2024-11-07 PROCEDURE — 80069 RENAL FUNCTION PANEL: CPT

## 2024-11-07 PROCEDURE — 84132 ASSAY OF SERUM POTASSIUM: CPT

## 2024-11-07 PROCEDURE — 2720000007 HC OR 272 NO HCPCS: Performed by: INTERNAL MEDICINE

## 2024-11-07 PROCEDURE — 83605 ASSAY OF LACTIC ACID: CPT

## 2024-11-07 PROCEDURE — 94003 VENT MGMT INPAT SUBQ DAY: CPT

## 2024-11-07 PROCEDURE — C1894 INTRO/SHEATH, NON-LASER: HCPCS | Performed by: INTERNAL MEDICINE

## 2024-11-07 PROCEDURE — 8010000001 HC DIALYSIS - HEMODIALYSIS PER DAY

## 2024-11-07 PROCEDURE — 2500000001 HC RX 250 WO HCPCS SELF ADMINISTERED DRUGS (ALT 637 FOR MEDICARE OP)

## 2024-11-07 PROCEDURE — 2500000004 HC RX 250 GENERAL PHARMACY W/ HCPCS (ALT 636 FOR OP/ED)

## 2024-11-07 PROCEDURE — 85025 COMPLETE CBC W/AUTO DIFF WBC: CPT

## 2024-11-07 PROCEDURE — 2750000001 HC OR 275 NO HCPCS: Performed by: INTERNAL MEDICINE

## 2024-11-07 PROCEDURE — 2500000002 HC RX 250 W HCPCS SELF ADMINISTERED DRUGS (ALT 637 FOR MEDICARE OP, ALT 636 FOR OP/ED)

## 2024-11-07 PROCEDURE — 36415 COLL VENOUS BLD VENIPUNCTURE: CPT

## 2024-11-07 PROCEDURE — 84132 ASSAY OF SERUM POTASSIUM: CPT | Performed by: STUDENT IN AN ORGANIZED HEALTH CARE EDUCATION/TRAINING PROGRAM

## 2024-11-07 PROCEDURE — 33274 TCAT INSJ/RPL PERM LDLS PM: CPT | Performed by: INTERNAL MEDICINE

## 2024-11-07 PROCEDURE — 83735 ASSAY OF MAGNESIUM: CPT

## 2024-11-07 PROCEDURE — 33210 INSERT ELECTRD/PM CATH SNGL: CPT | Performed by: INTERNAL MEDICINE

## 2024-11-07 PROCEDURE — 36430 TRANSFUSION BLD/BLD COMPNT: CPT

## 2024-11-07 PROCEDURE — G0269 OCCLUSIVE DEVICE IN VEIN ART: HCPCS | Mod: 59 | Performed by: INTERNAL MEDICINE

## 2024-11-07 PROCEDURE — 2500000005 HC RX 250 GENERAL PHARMACY W/O HCPCS

## 2024-11-07 PROCEDURE — X2HK3V9 INSERTION OF DUAL-CHAMBER INTRACARDIAC PACEMAKER INTO RIGHT VENTRICLE, PERCUTANEOUS APPROACH, NEW TECHNOLOGY GROUP 9: ICD-10-PCS | Performed by: INTERNAL MEDICINE

## 2024-11-07 PROCEDURE — 2020000001 HC ICU ROOM DAILY

## 2024-11-07 PROCEDURE — C1786 PMKR, SINGLE, RATE-RESP: HCPCS | Performed by: INTERNAL MEDICINE

## 2024-11-07 PROCEDURE — 99233 SBSQ HOSP IP/OBS HIGH 50: CPT | Performed by: NURSE PRACTITIONER

## 2024-11-07 PROCEDURE — 90937 HEMODIALYSIS REPEATED EVAL: CPT

## 2024-11-07 PROCEDURE — P9073 PLATELETS PHERESIS PATH REDU: HCPCS

## 2024-11-07 PROCEDURE — C1730 CATH, EP, 19 OR FEW ELECT: HCPCS | Performed by: INTERNAL MEDICINE

## 2024-11-07 PROCEDURE — 82947 ASSAY GLUCOSE BLOOD QUANT: CPT

## 2024-11-07 PROCEDURE — 87040 BLOOD CULTURE FOR BACTERIA: CPT

## 2024-11-07 PROCEDURE — 71045 X-RAY EXAM CHEST 1 VIEW: CPT | Performed by: RADIOLOGY

## 2024-11-07 PROCEDURE — 2500000005 HC RX 250 GENERAL PHARMACY W/O HCPCS: Performed by: STUDENT IN AN ORGANIZED HEALTH CARE EDUCATION/TRAINING PROGRAM

## 2024-11-07 PROCEDURE — 82435 ASSAY OF BLOOD CHLORIDE: CPT

## 2024-11-07 PROCEDURE — 84132 ASSAY OF SERUM POTASSIUM: CPT | Performed by: INTERNAL MEDICINE

## 2024-11-07 PROCEDURE — C1760 CLOSURE DEV, VASC: HCPCS | Performed by: INTERNAL MEDICINE

## 2024-11-07 PROCEDURE — 2500000004 HC RX 250 GENERAL PHARMACY W/ HCPCS (ALT 636 FOR OP/ED): Performed by: STUDENT IN AN ORGANIZED HEALTH CARE EDUCATION/TRAINING PROGRAM

## 2024-11-07 PROCEDURE — 2780000003 HC OR 278 NO HCPCS: Performed by: INTERNAL MEDICINE

## 2024-11-07 PROCEDURE — 76937 US GUIDE VASCULAR ACCESS: CPT | Performed by: INTERNAL MEDICINE

## 2024-11-07 PROCEDURE — C1769 GUIDE WIRE: HCPCS | Performed by: INTERNAL MEDICINE

## 2024-11-07 PROCEDURE — 94762 N-INVAS EAR/PLS OXIMTRY CONT: CPT

## 2024-11-07 PROCEDURE — 37799 UNLISTED PX VASCULAR SURGERY: CPT

## 2024-11-07 DEVICE — TPS MC2AVR1 MICRA AV2 US
Type: IMPLANTABLE DEVICE | Site: HEART | Status: FUNCTIONAL
Brand: MICRA™ AV2

## 2024-11-07 RX ORDER — LIDOCAINE HYDROCHLORIDE AND EPINEPHRINE 10; 10 MG/ML; UG/ML
30 INJECTION, SOLUTION INFILTRATION; PERINEURAL ONCE
Status: COMPLETED | OUTPATIENT
Start: 2024-11-07 | End: 2024-11-07

## 2024-11-07 RX ORDER — DEXTROSE MONOHYDRATE 100 MG/ML
50 INJECTION, SOLUTION INTRAVENOUS CONTINUOUS
Status: ACTIVE | OUTPATIENT
Start: 2024-11-07 | End: 2024-11-07

## 2024-11-07 RX ORDER — LIDOCAINE HYDROCHLORIDE AND EPINEPHRINE BITARTRATE 20; .01 MG/ML; MG/ML
1.7 INJECTION, SOLUTION SUBCUTANEOUS ONCE
Status: DISCONTINUED | OUTPATIENT
Start: 2024-11-07 | End: 2024-11-07

## 2024-11-07 RX ORDER — ACETAMINOPHEN 160 MG/5ML
650 SOLUTION ORAL EVERY 4 HOURS PRN
Status: DISCONTINUED | OUTPATIENT
Start: 2024-11-07 | End: 2024-11-10 | Stop reason: HOSPADM

## 2024-11-07 RX ORDER — DEXTROSE 50 % IN WATER (D50W) INTRAVENOUS SYRINGE
25 ONCE
Status: COMPLETED | OUTPATIENT
Start: 2024-11-07 | End: 2024-11-07

## 2024-11-07 RX ORDER — DEXTROSE MONOHYDRATE 100 MG/ML
50 INJECTION, SOLUTION INTRAVENOUS CONTINUOUS
Status: DISPENSED | OUTPATIENT
Start: 2024-11-07 | End: 2024-11-07

## 2024-11-07 RX ORDER — ACETAMINOPHEN 650 MG/1
650 SUPPOSITORY RECTAL EVERY 4 HOURS PRN
Status: DISCONTINUED | OUTPATIENT
Start: 2024-11-07 | End: 2024-11-10 | Stop reason: HOSPADM

## 2024-11-07 RX ORDER — NAPROXEN SODIUM 220 MG/1
81 TABLET, FILM COATED ORAL DAILY
Status: DISCONTINUED | OUTPATIENT
Start: 2024-11-08 | End: 2024-11-10 | Stop reason: HOSPADM

## 2024-11-07 RX ORDER — ACETAMINOPHEN 325 MG/1
650 TABLET ORAL EVERY 4 HOURS PRN
Status: DISCONTINUED | OUTPATIENT
Start: 2024-11-07 | End: 2024-11-10 | Stop reason: HOSPADM

## 2024-11-07 RX ORDER — TALC
3 POWDER (GRAM) TOPICAL NIGHTLY PRN
Status: DISCONTINUED | OUTPATIENT
Start: 2024-11-07 | End: 2024-11-08

## 2024-11-07 RX ORDER — VANCOMYCIN HYDROCHLORIDE 1 G/20ML
INJECTION, POWDER, LYOPHILIZED, FOR SOLUTION INTRAVENOUS AS NEEDED
Status: DISCONTINUED | OUTPATIENT
Start: 2024-11-07 | End: 2024-11-07 | Stop reason: HOSPADM

## 2024-11-07 RX ORDER — BUPIVACAINE HYDROCHLORIDE 5 MG/ML
INJECTION, SOLUTION EPIDURAL; INTRACAUDAL AS NEEDED
Status: DISCONTINUED | OUTPATIENT
Start: 2024-11-07 | End: 2024-11-07 | Stop reason: HOSPADM

## 2024-11-07 ASSESSMENT — PAIN SCALES - GENERAL
PAINLEVEL_OUTOF10: 0 - NO PAIN

## 2024-11-07 ASSESSMENT — PAIN - FUNCTIONAL ASSESSMENT
PAIN_FUNCTIONAL_ASSESSMENT: CPOT (CRITICAL CARE PAIN OBSERVATION TOOL)
PAIN_FUNCTIONAL_ASSESSMENT: CPOT (CRITICAL CARE PAIN OBSERVATION TOOL)
PAIN_FUNCTIONAL_ASSESSMENT: UNABLE TO ASSESS
PAIN_FUNCTIONAL_ASSESSMENT: CPOT (CRITICAL CARE PAIN OBSERVATION TOOL)

## 2024-11-07 NOTE — PROGRESS NOTES
Case was reviewed in Valve and Structural Heart team meeting.   Attendees: Betty Dwyer, Nicolette Maloney, Dr. Garcia, Nicolette Maloney, Dane Burt,  Trever Osuna, Dr. Rui Prakash,  Dr. Hart, Dr. Chowdhury, Golden Oliver, Dr. Lew, Dr. Trinidad, Jia Townsend, Elli Castro, Aisha Garces, Dr. Cheng, Dr. Hemphill, Zenaida Garrido  TAVR Workup:   - NYHA: III  - Frailty: 2/5       - EKG:  Afib,  msec.  - TTE: EF 31%, severe aortic stenosis, AV gradient 64/39, Vmax 4, RIAN 0.7, DI 0.19, moderate MAC, mild AI  - CT TAVR: 10/31  - LHC: 11/1  - dental clearance:  10/29 panorex  - STS 38%    Conclusion: Access: good  Valve 29Evolut Fx+ , Scheduled inpt TAVR 11/5

## 2024-11-07 NOTE — CARE PLAN
The patient's goals for the shift include      The clinical goals for the shift include Patient will have no c/o SOB through shift.

## 2024-11-07 NOTE — CARE PLAN
The patient's goals for the shift include      The clinical goals for the shift include pt will remain HDS     Over the shift, the patient did not make progress toward the following goals.  Recommendations to address these barriers include   Problem: Respiratory  Goal: Clear secretions with interventions this shift  Outcome: Progressing  Goal: Minimize anxiety/maximize coping throughout shift  Outcome: Progressing  Goal: Minimal/no exertional discomfort or dyspnea this shift  Outcome: Progressing  Goal: No signs of respiratory distress (eg. Use of accessory muscles. Peds grunting)  Outcome: Progressing  Goal: Patent airway maintained this shift  Outcome: Progressing  Goal: Tolerate mechanical ventilation evidenced by VS/agitation level this shift  Outcome: Progressing  Goal: Tolerate pulmonary toileting this shift  Outcome: Progressing  Goal: Verbalize decreased shortness of breath this shift  Outcome: Progressing  Goal: Wean oxygen to maintain O2 saturation per order/standard this shift  Outcome: Progressing  Goal: Increase self care and/or family involvement in next 24 hours  Outcome: Progressing     Problem: Fall/Injury  Goal: Not fall by end of shift  Outcome: Progressing  Goal: Be free from injury by end of the shift  Outcome: Progressing  Goal: Verbalize understanding of personal risk factors for fall in the hospital  Outcome: Progressing  Goal: Verbalize understanding of risk factor reduction measures to prevent injury from fall in the home  Outcome: Progressing  Goal: Use assistive devices by end of the shift  Outcome: Progressing  Goal: Pace activities to prevent fatigue by end of the shift  Outcome: Progressing     Problem: Pain - Adult  Goal: Verbalizes/displays adequate comfort level or baseline comfort level  Outcome: Progressing     Problem: Skin  Goal: Decreased wound size/increased tissue granulation at next dressing change  Outcome: Progressing  Goal: Participates in plan/prevention/treatment  measures  Outcome: Progressing  Goal: Prevent/manage excess moisture  Outcome: Progressing  Goal: Prevent/minimize sheer/friction injuries  Outcome: Progressing  Goal: Promote/optimize nutrition  Outcome: Progressing  Goal: Promote skin healing  Outcome: Progressing     Problem: Diabetes  Goal: Achieve decreasing blood glucose levels by end of shift  Outcome: Progressing  Goal: Increase stability of blood glucose readings by end of shift  Outcome: Progressing  Goal: Decrease in ketones present in urine by end of shift  Outcome: Progressing  Goal: Maintain electrolyte levels within acceptable range throughout shift  Outcome: Progressing  Goal: Maintain glucose levels >70mg/dl to <250mg/dl throughout shift  Outcome: Progressing  Goal: No changes in neurological exam by end of shift  Outcome: Progressing  Goal: Learn about and adhere to nutrition recommendations by end of shift  Outcome: Progressing  Goal: Vital signs within normal range for age by end of shift  Outcome: Progressing  Goal: Increase self care and/or family involovement by end of shift  Outcome: Progressing  Goal: Receive DSME education by end of shift  Outcome: Progressing     Problem: Pain  Goal: Takes deep breaths with improved pain control throughout the shift  Outcome: Progressing  Goal: Turns in bed with improved pain control throughout the shift  Outcome: Progressing  Goal: Walks with improved pain control throughout the shift  Outcome: Progressing  Goal: Performs ADL's with improved pain control throughout shift  Outcome: Progressing  Goal: Participates in PT with improved pain control throughout the shift  Outcome: Progressing  Goal: Free from opioid side effects throughout the shift  Outcome: Progressing  Goal: Free from acute confusion related to pain meds throughout the shift  Outcome: Progressing

## 2024-11-07 NOTE — SIGNIFICANT EVENT
Follow-up from previous note.    After 1h of manual compression, groin looks without change. I applied pressure dressing to the groin and confirmed popliteal pulse by doppler.   Will continue to monitor groin and distal pulse, and duplex US result.   Continue to hold anticoagulation until resolution of groin PSA/bleeding.

## 2024-11-07 NOTE — PROGRESS NOTES
Occupational Therapy    Communication Note    Patient Name: Faviola Pleitez  MRN: [id number]  Today's Date: 11/7/2024   Room: 18/18-A    Discipline: Occupational Therapy      Missed Visit: Yes  Missed Visit Reason:  (0832: pt with asystole following TVP removal yesterday, now intubated and with replacement pending PPM today. Not appropriate for OT evaluation at this time.)      11/07/24 at 8:34 AM   Brynn Buck, OT   Rehab Office: [phone]

## 2024-11-07 NOTE — PROGRESS NOTES
Subjective Data:  S/p TAVR      Objective Data:  Last Recorded Vitals:  Vitals:    11/07/24 0600 11/07/24 0700 11/07/24 0800 11/07/24 0900   BP:       BP Location:       Patient Position:       Pulse: 80 77 75 73   Resp: 20 19 18 21   Temp:   36.4 °C (97.5 °F)    TempSrc:       SpO2: 100% 99% (Abnormal) 88% 90%   Weight:       Height:           Last Labs:  CBC - 11/7/2024:  2:06 AM  20.6 9.3 49    29.3      CMP - 11/7/2024:  6:03 AM  8.2 6.2 22 --- 0.5   4.1 2.9 14 94      PTT - 11/6/2024:  7:06 PM  1.1   12.9 29     BNP   Date/Time Value Ref Range Status   10/28/2024 09:50 AM 1,284 0 - 99 pg/mL Final     HGBA1C   Date/Time Value Ref Range Status   11/05/2024 12:43 PM 7.3 See comment % Final   08/07/2024 03:16 AM 7.0 4.0 - 5.6 % Final   01/16/2024 04:33 PM 7.4 4.0 - 5.6 % Final     LDLCALC   Date/Time Value Ref Range Status   11/05/2024 12:43 PM 45 <=99 mg/dL Final     Comment:                                 Near   Borderline      AGE      Desirable  Optimal    High     High     Very High     0-19 Y     0 - 109     ---    110-129   >/= 130     ----    20-24 Y     0 - 119     ---    120-159   >/= 160     ----      >24 Y     0 -  99   100-129  130-159   160-189     >/=190       VLDL   Date/Time Value Ref Range Status   11/05/2024 12:43 PM 19 0 - 40 mg/dL Final      Last I/O:  I/O last 3 completed shifts:  In: 1000 (8.1 mL/kg) [I.V.:600 (4.9 mL/kg); Other:400]  Out: 181 (1.5 mL/kg) [Emesis/NG output:100; Other:81]  Weight: 123.4 kg     Past Cardiology Tests (Last 3 Years):  EKG:  ECG 12 lead 11/07/2024 (Preliminary)      ECG 12 lead 11/05/2024 (Preliminary)      ECG 12 Lead 10/31/2024      ECG 12 lead 10/28/2024    Echo:  Transthoracic Echo (TTE) Limited 11/06/2024      Transthoracic Echo (TTE) Limited 11/06/2024      Transthoracic Echo (TTE) Limited 11/05/2024      Transthoracic Echo (TTE) Complete 10/29/2024    Ejection Fractions:  EF   Date/Time Value Ref Range Status   11/06/2024 04:42 PM 55 %    11/06/2024 08:04  AM 52 %    10/29/2024 12:00 PM 31 %      Cath:  Cardiac Catheterization Procedure 11/06/2024      Cardiac Catheterization Procedure 11/05/2024      Cardiac Catheterization Procedure 11/01/2024    Stress Test:  No results found for this or any previous visit from the past 1095 days.    Cardiac Imaging:  No results found for this or any previous visit from the past 1095 days.      Inpatient Medications:  Scheduled medications   Medication Dose Route Frequency    [Held by provider] amiodarone  200 mg oral Daily    [Held by provider] apixaban  5 mg oral BID    aspirin  81 mg oral Daily    atorvastatin  40 mg oral q AM    calcium acetate  1,334 mg oral TID    clopidogrel  75 mg oral Daily    fluticasone furoate-vilanteroL  1 puff inhalation Daily    Glucommander - insulin glargine  1-125 Units subcutaneous Daily    And    Glucommander - insulin lispro  0-125 Units subcutaneous With meals & nightly    lactulose  300 mL rectal Once    melatonin  6 mg oral Nightly    meropenem  500 mg intravenous q24h    oxygen   inhalation Continuous - Inhalation    oxygen   inhalation Continuous - Inhalation    pantoprazole  40 mg oral Daily before breakfast    perflutren lipid microspheres  0.5-10 mL of dilution intravenous Once in imaging    polyethylene glycol  17 g oral Daily    sennosides  2 tablet oral BID    sennosides-docusate sodium  1 tablet oral Nightly    sodium zirconium cyclosilicate  10 g oral q8h    sodium zirconium cyclosilicate  10 g oral q8h    vitamin B complex-vitamin C-folic acid  1 capsule oral Daily     PRN medications   Medication    acetaminophen    bisacodyl    dextrose    glucagon    glucagon    glucagon HCL    Glucommander - insulin lispro    guaiFENesin    HYDROmorphone    melatonin    midazolam    midazolam    ondansetron    polyethylene glycol    vancomycin     Continuous Medications   Medication Dose Last Rate    dextrose 10 % in water (D10W)  50 mL/hr      DOPamine  0-10 mcg/kg/min Stopped (11/06/24 1330)     fentaNYL   mcg/hr 100 mcg/hr (11/07/24 0238)    midazolam  0-20 mg/hr 2 mg/hr (11/06/24 1626)    norepinephrine  0-0.2 mcg/kg/min 0.14 mcg/kg/min (11/07/24 0858)    vasopressin  0.03 Units/min 0.03 Units/min (11/06/24 9695)       Physical Exam:  Constitutional: Well developed, awake/alert/oriented x3, no distress,  cooperative  Eyes: PERRL, EOMI, clear sclera  ENMT: mucous membranes moist, no apparent injury, no lesions seen  Head/Neck: Neck supple, no apparent injury, thyroid without mass or tenderness, No JVD, trachea midline, no bruits  Respiratory/Thorax: Patent airways,  good chest expansion, thorax symmetric, fine bibasilar crackles  Cardiovascular: Regular rate and rhythm, no murmurs, normal S 1and S 2  Gastrointestinal: Nondistended, soft, non-tender, no rebound tenderness or guarding, no masses palpable, no organomegaly, +BS, no bruits  Extremities: normal extremities, no cyanosis,  bilat groins c/d/i with dsd no s/s of hematoma  Neurological: alert and oriented x3, intact senses, motor, response and reflexes, normal strength  Psychological: sedated   Skin: Warm and dry, intact      Assessment/Plan   Faviola Pleitez is now s/p TAVR Evolut FX + 29mm via B/L femoral artery (Right CFA primary) on 11/05/2024 with Dr. Hatr and Dr. Trinidad.  TVP kept due to noted to have transient heart block.  Final procedural ECHO showed AV mean gradient 5 mmHg, mild PVL, no PC effusion.  Pt transferred to the CICU for monitoring overnight.     Pre EKG - Afib HR 73 (NV --, )    Post  EKG - Afib and Ventricular paced complexes HR 51 (NV ---, )       POD 1 - No issues overnight.  Vitals, labs, neuro assessment, and groins remained stable.  Pt denies CP, SOB, abd/groin pain, numbness or tingling of BLE.  Chronic diastolic heart failure present as evidence by mild FVO on assessment with BLE edema, mostly clear lungs with diminished bases with faint fine bibasilar crackles, sats stable on 3L  NC.      CARDS:  #CARDIAC ARREST - 11/6  TVP was removed around Noon for patient to be stepped down. After pulling the TVP, patient was okay but after 1 min she went into asystole. Compressed patient for 6-8 minutes and gave one dose of epinephrine. Was transcutaneously paced, and emergently taken to cath lab for replacement of TVP.  After placement of recurrent TVP patient lost capture after coming to the CICU and gave 2 compressions. She also became hypotensive and then dropped her pressures for which levophed and dopamine were started. She was intubated as she was vomiting fluids. Arterial line was placed and thereafter patient started to develop a hematoma in the groin for which pressure was applied. Monitoring for hematoma expansion.  - Started levophed, dopamine, and vasopressin drips to titrate MAP>65  - Started Fentanyl and versed drips  - Family updated and discussed care with them     #Severe AS, s/p successful TAVR with Evolut FX+ 29 mm 11/5  #Heart block 11/6  TTE 10/29/24: EF 31%, GHK, reduced RV systolic function, LA mildly dilated, mod mitral annular calcification, mod elevated RVSP, Severe calcific AS with gradients of 64/38.4mmHg and DI of 0.19 and mild AI. ( Note the gradients and VTI were averaged over 5 consecutive beats given atrial fibrillation ) consistent with severe aortic stenosis, severe AV cusp calc, mild AR. S/p successful TAVR with Evolut FX+ 29 mm 11/5. Left CFV TVP placed as well for transient heart block. Pre LVEDP: 20 mmHg. Pst LVEDP:  22 mmHg. Post gradient TTE: 5 mmHg.  - Repeat TTE 11/6 - LV EF low normal at 55%, mildly enlarged RV, low normal RVSF, Evolut transcatheter aortic valve bioprosthesis  - Plan for permanent pacemaker placement today     #Acute systolic and diastolic heart failure, decompensated  #CAD s/p PCI, 11/1/24  TTE report as above - global hypokinesis. BNP 1284 on admission. [facility] 11/1: Culprit vessel(s): left anterior descending. OCT guided successful PCI to LM  and mid and proximal LAD with double stenting. Successful DCB for in stent restenosis of LAD stents. Lcx 50% ostial stenosis.  - Volume management with HD - nephrology managing  - GDMT limited due to ESRD on HD  - DAPT with Plavix and ASA for at least 1 year (Severe mid LAD stenosis 90% distal to prox LAD stent s/p DIANA x1 )  - Atorvastatin 40 mg daily     #Newly diagnosed afib  10/25/24 at OSH.  - Holding anticoagulation given hematoma.  If continues AF, would do AC and Plavix, no ASA  - Amiodarone 200mg daily     PULM  #Rhinovirus positive at OSH, resolved  #Pulmonary nodules, incidental finding  CT TAVR: Multiple solid non-calcified pulmonary nodules measuring up to 7 mm. In absence of prior comparative examinations, to ensure stability, consider follow up non contrast chest CT at 3-6 months. Completed prednisone taper per OSH recs during this [facility] admission.  - Baseline 3L NC  - Breo-Ellipta daily     NEPHRO:  #Diabetic ESRD on HD T, Th, Sat via left arm fistula  S/p HD 10/29, 10/31, 11/2, 11/4.       HEME:  #Anemia of chronic disease  #Thrombocytyopenia  Hemoglobin 11.1 (10/30),----->9.3 today   (10/30)------> 49 today  - CTM  - Consider IV iron  - send HIT panel (Heparin used for TAVR)     ENDO:  #DM2  A1c on  11/5 of 7.3  - Continue glucommander         D/W Dr. Hart        Peripheral IV 20 G Right Antecubital (Active)   Site Assessment Clean;Dry;Intact 11/07/24 0400   Dressing Type Transparent 11/07/24 0400   Line Status No blood return;Flushed;Saline locked 11/07/24 0400   Dressing Status Clean;Occlusive;Dry 11/07/24 0400   Number of days: 10       Peripheral IV 10/29/24 22 G Right;Anterior Forearm (Active)   Site Assessment Clean;Dry;Intact 11/07/24 0400   Dressing Type Transparent 11/07/24 0400   Line Status Infusing 11/07/24 0400   Dressing Status Clean;Dry;Occlusive 11/07/24 0400   Number of days: 9       ETT  8 mm (Active)   Secured at (cm) 23 cm 11/07/24 0743   Measured from Lips 11/07/24  0743   Secured Location Center 11/07/24 0743   Secured by Commercial tube reyes 11/07/24 0743   Number of days: 1       NG/OG/Feeding Tube OG - [facility] mouth (Active)   Tube Status Medication administration only 11/06/24 2000   Placement Verification X-ray 11/06/24 2000   Distal Tube Measurement 74 cm 11/06/24 2000   Site Assessment Clean;Dry;Intact 11/06/24 2000   Irrigant Tap water 11/06/24 2000   Response To Intervention No resistance met 11/06/24 2000   Number of days: 1       Hemodialysis Arteriovenous Fistula 11/03/24 Left Upper arm (Active)   AV Fistula Present;Thrill;Bruit 11/07/24 0400   Site Assessment Clean;Dry;Intact 11/07/24 0400   Status Deaccessed 11/07/24 0400   Number of days: 4       Code Status:  Full Code    I spent 50 minutes in the professional and overall care of this patient.        Benjamin Hall, APRN-CNP   no

## 2024-11-07 NOTE — PROGRESS NOTES
Faviola Pleitez is a 69 y.o. female on day 10 of admission presenting with Severe aortic stenosis.      Subjective   Patient did well overnight with no acute events after 2 episodes of coding with TVP removal. Given additional insulin and dextrose overnight for potassium. Had to stop HD yesterday as patient could not tolerate due to hypotension - only did 24 min of HD. Lactic improved to 2.3. Had Micra pacemaker placed in RV by EP. Getting CVVH started given persistently high potassium and reliance on HD with ESRD. 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    Objective     Last Recorded Vitals  BP (!) 135/48   Pulse 70   Temp 35.7 °C (96.3 °F) (Temporal)   Resp 17   Wt 124 kg (273 lb 5.9 oz)   SpO2 90%   Intake/Output last 3 Shifts:    Intake/Output Summary (Last 24 hours) at 11/7/2024 1611  Last data filed at 11/7/2024 1511  Gross per 24 hour   Intake 1979.61 ml   Output 86 ml   Net 1893.61 ml       Admission Weight  Weight: 119 kg (262 lb 12.6 oz) (10/28/24 0829)    Daily Weight  11/07/24 : 124 kg (273 lb 5.9 oz)    Image Results  XR chest 1 view  Narrative: Interpreted By:  Field, Tereso,  and Natalya Yaritza   STUDY:  XR CHEST 1 VIEW;  11/6/2024 7:07 pm      INDICATION:  Signs/Symptoms:saturation.      COMPARISON:  Same day chest radiograph time stamped 4:28 p.m.      ACCESSION NUMBER(S):  [id number]      ORDERING CLINICIAN:  THUY PATTERSON      FINDINGS:  AP radiograph of the chest was provided.      Endotracheal tube in place with tip projecting 4.8 cm above the  jean-paul. Enteric tube courses over the mid-thorax and below the  diaphragm with tip projecting over the gastric body. Left internal  jugular approach transvenous pacing wire in similar positioning.  Status post TAVR.      CARDIOMEDIASTINAL SILHOUETTE:  Moderate to moderately large cardiomegaly. Cardiomediastinal  silhouette is stable in size and configuration.      LUNGS:  Hazy interstitial opacities throughout the bilateral lungs,  similar  to prior. Mild blunting of the left costophrenic angle. The right  costophrenic angle is excluded from the field of view. Prominence of  the right perihilar structures. No pneumothorax.      ABDOMEN:  No remarkable upper abdominal findings.      BONES:  No acute osseous abnormality.      Impression: 1. Hazy interstitial edema and atelectasis, similar to prior, with a  trace left pleural effusion.  2. Prominence of the right perihilar structures may also indicate  interstitial edema and atelectasis or vascular congestion.  3. Medical devices as above.      I personally reviewed the image(s)/study and resident interpretation  as stated by Dr. Yaritza Hoang MD. I agree with the findings as  stated. This study was interpreted at [facility], [place], [place].      MACRO:  None      Signed by: Tereso Hampton 11/7/2024 3:49 PM  Dictation workstation:   [id number]  Vascular US Lower Extremity Pseudoaneurysm Evaluation Duplex Left              [place] [address]    Tel [phone] and Fax [phone]       Vascular Lab Report  [facility] US LOWER EXTREMITY PSEUDOANEURYSM DUPLEX LEFT       Patient Name:      JUDY SAGASTUME       Reading Physician:  [id number] Vaughn Castillo DO  Study Date:        11/7/2024            Ordering Physician: [id number] THUY PATTERSON  MRN/PID:           [id number]             Technologist:       Sandoval Santana RVT, RDMS  Accession#:        [id number]         Technologist 2:  Date of Birth/Age: 1955 / 69 years Encounter#:         [id number]  Gender:            F  Admission Status:  Inpatient            Location Performed: [facility]       Diagnosis/ICD: Atherosclerosis of other arteries-I70.8  CPT Codes:     12795  Peripheral artery Lower arterial Duplex limited       Patient History R/O Pseudo left.       **CRITICAL RESULT**  Critical Result: Lt ROBERTA lieberman  Notification called to Sr. Maher on 11/7/2024 at 9:50:00 AM by HP at bedside.     CONCLUSIONS:  Left Lower Arterial: The pseudoaneurism is noted to measures aprox. 1.6x1.3x2.5cm.  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 No evidence of DVT in the visualized vessels.     Imaging & Doppler Findings:     Right       Left   PSV        PSV         cm/s        CFA 88 cm/s        SFA 81 cm/s        PFA 39 cm/s       Left Pseudo Aneurysm                      Diam  Left Pseudoaneurysm 2.5 cm       [id number] Vaughn Castillo DO  Electronically signed by [id number] Vaughn Castillo DO on 11/7/2024 at 1:00:21 PM       ** Final **  Cardiac Catheterization Procedure     [facility], Cath Lab, [address] [place] [address]    Cardiovascular Catheterization Report    Patient Name:      JUDY SAGASTUME       Performing Physician:  [id number] Naman Maher DO  Study Date:        11/6/2024            Verifying Physician:   [id number] Naman Maher DO  MRN/PID:           [id number]             Cardiologist/Co-Scrub:  Accession#:        [id number]         Ordering Provider:     MELY INVASIVE                                                                 CARDIOLOGIST                                                                 LYUBOV  Date of Birth/Age: 1955 / 69 years Cardiologist:  Gender:            F                    Fellow:                [id number] Gt Bello MD  Admit Date:                             Fellow:                [id number] Richard Steele MD  Encounter#:         [id number]           Surgeon:       Study: TVP       Indications:  Suspected coronary artery disease.  Medical History:  Frailty status of patient entering lab: 4 = Vulnerable.       Procedure Description:  After infiltration of local anesthetic, the left internal jugular vein was identified with two-dimensional ultrasound. Under direct ultrasound visualization, the left internal jugular vein was cannulated with a micropuncture technique. A 8 Nepalese sheath was placed in the vein. Post-procedure, the venous sheath was left intact and sutured in place.     Procedure Description Comments:  After identifying the left internal jugular vein using ultrasound we proceeded with placing a microcatheter sheath confirmed under fluoroscopy the course of the wire in the RA we then upsized to 8 Nepalese sheath was placed. We then floated a temporarily RV wire to the apex of the RV. The pacing rate and output was tested and the output was lost at 3 mV. The pacer was set at 70. Patient tolerated procedure very well.     Hemo Personnel:  +--------------------+---------+  Name                Duty       +--------------------+---------+  Naman Maher MD 1  +--------------------+---------+       Complications:  No in-lab complications observed.     Cardiac Cath Post Procedure Notes:  Post Procedure Diagnosis: S/p TVP emergent placement.  Blood Loss:               Estimated blood loss during the procedure was 10cc                            mls.  Specimens Removed:        Number of specimen(s) removed: none.    ____________________________________________________________________________________  CONCLUSIONS:   1. Indication: Complete heart block post TAVR.   2. S/p left IJ TVP placement, rate set at 70bpm.    ICD 10 Codes:  Atrioventricular block, complete-I44.2     CPT Codes:  Insertion of temporary single chamber cardiac electrode or pacemaker catheter (separate procedure)-04232 [id number] Namansami Valenzuela  Physician  Electronically signed by [id number] Naman Maher DO on 11/6/2024 at 9:21:06 PM         ** Final **  ECG 12 lead daily  Undetermined rhythm  ST & T wave abnormality, consider lateral ischemia  Prolonged QT  Abnormal ECG  When compared with ECG of 05-NOV-2024 12:49,  Current undetermined rhythm precludes rhythm comparison, needs review  Transthoracic Echo (TTE) Limited     [facility], [address] [place] [address]                 Tel [phone] and Fax [phone]    TRANSTHORACIC ECHOCARDIOGRAM REPORT       Patient Name:       JUDY SAGASTUME      Reading Physician:    [id number] Thu Solis MD  Study Date:         11/6/2024           Ordering Provider:    [id number] WILI LIVE  MRN/PID:            [id number]            Fellow:  Accession#:         [id number]        Nurse:  Date of Birth/Age:  1955 / 69      Sonographer:          Tati guido                                     RDANNA  Gender assigned at  F                   Additional Staff:  Birth:  Height:             162.56 cm           Admit Date:           11/5/2024  Weight:             123.38 kg           Admission Status:     Inpatient -                                                                Routine  BSA / BMI:          2.23 m2 / 46.69     Encounter#:           [id number]                      kg/m2  Blood Pressure:     131/98 mmHg         Department Location:  [facility]    Study Type:    TRANSTHORACIC ECHO (TTE) LIMITED  Diagnosis/ICD: Presence of prosthetic heart valve-Z95.2  Indication:    s/p TAVR  CPT Code:      Echo Limited-32893    Patient History:  Valve Disorders: Aortic Valve Replacement.  Diabetes:        Yes    Study Detail: The following Echo studies were performed: 2D, M-Mode, Doppler and                color flow. Definity used as a  contrast agent for endocardial                border definition. Total contrast used for this procedure was 1 mL                via IV push.       PHYSICIAN INTERPRETATION:  Left Ventricle: Left ventricular ejection fraction is mildly decreased, calculated by Daniels's biplane at 52%. There are no regional left ventricular wall motion abnormalities. The left ventricular cavity size is normal. Left ventricular diastolic filling cannot be determined, due to atrial fibrillation/flutter.  Left Atrium: The left atrium was not assessed.  Right Ventricle: The right ventricle was not well visualized. There is reduced right ventricular systolic function. A device is visualized in the right ventricle.  Right Atrium: The right atrium was not well visualized. There is a device visualized in the right atrium.  Aortic Valve: There is a prosthetic aortic valve present. The aortic valve dimensionless index is 0.40. There is a Medtronic transcatheter aortic valve bioprosthesis with a 29 mm reported size. There is mild aortic valve regurgitation. The peak instantaneous gradient of the aortic valve is 23 mmHg. The mean gradient of the aortic valve is 11 mmHg. S/p 29mm Medtronic Evolut FX+ TAVR with gradients of 23/11mmHg and mild paravalvlar AI. ( NOte4 gradinets and VTI averaged due to AFib).  Mitral Valve: The mitral valve is normal in structure. There is moderate mitral annular calcification. There is trace mitral valve regurgitation.  Tricuspid Valve: The tricuspid valve is structurally normal. There is mild tricuspid regurgitation. The Doppler estimated RVSP is moderately elevated at 53.9 mmHg.  Pulmonic Valve: The pulmonic valve is not well visualized. There is physiologic pulmonic valve regurgitation.  Pericardium: Trivial pericardial effusion.  Aorta: The aortic root is normal.  Systemic Veins: The inferior vena cava appears dilated, with IVC inspiratory collapse less than 50%. Line noted within the IVC.  In comparison to the  previous echocardiogram(s): Compared with study dated 11/5/2024,. Compared with the periprocedural echo from 11/5/2024 the post TAVR gradients were11/5mmHg and are higher today. There was already mild perivalvular AI at that time. LVEF could not be seen on the prior exam.       CONCLUSIONS:   1. Poorly visualized anatomical structures due to suboptimal image quality.   2. Left ventricular ejection fraction is mildly decreased, calculated by Daniels's biplane at 52%.   3. Left ventricular diastolic filling cannot be determined, due to atrial fibrillation/flutter.   4. There is reduced right ventricular systolic function.   5. There is moderate mitral annular calcification.   6. Moderately elevated right ventricular systolic pressure.   7. S/p 29mm Medtronic Evolut FX+ TAVR with gradients of 23/11mmHg and mild paravalvlar AI. ( NOte4 gradinets and VTI averaged due to AFib).   8. There is a Medtronic transcatheter aortic valve bioprosthesis with a 29 mm reported size.   9. Mild aortic valve regurgitation.  10. Compared with the periprocedural echo from 11/5/2024 the post TAVR gradients were11/5mmHg and are higher today. There was already mild perivalvular AI at that time. LVEF could not be seen on the prior exam.    RECOMMENDATIONS:  Utilizing an FDA cleared automated machine learning algorithm (EchoGo Heart Failure by [employer]), the analysis of the apical 4-chamber echocardiogram suggests the presence of heart failure with preserved ejection fraction (HFpEF)*. Clinical correlation looking for additional heart failure signs and symptoms is recommended, as a definite diagnosis of heart failure cannot be made by imaging alone.  *Per ACC/AHA/HFSA universal diagnosis of heart failure, HFpEF is defined as 1) signs and symptoms leading to clinical diagnosis of heart failure, 2) an ejection fraction of at least 50%, and 3) evidence of elevated intra-cardiac filling pressures by echocardiography, BNP elevation, or  catheterization.     QUANTITATIVE DATA SUMMARY:     2D MEASUREMENTS:          Normal Ranges:  LAs:             3.50 cm  (2.7-4.0cm)  IVSd:            0.90 cm  (0.6-1.1cm)  LVPWd:           1.10 cm  (0.6-1.1cm)  LVIDd:           5.10 cm  (3.9-5.9cm)  LVIDs:           3.50 cm  LV Mass Index:   84 g/m2  LVEDV Index:     68 ml/m2  LV % FS          31.4 %       AORTA MEASUREMENTS:         Normal Ranges:  Ao Sinus, d:        2.40 cm (2.1-3.5cm)  Asc Ao, d:          2.80 cm (2.1-3.4cm)       LV SYSTOLIC FUNCTION BY 2D PLANIMETRY (MOD):                       Normal Ranges:  EF-A4C View:    51 % (>=55%)  EF-A2C View:    54 %  EF-Biplane:     52 %  LV EF Reported: 52 %       LV DIASTOLIC FUNCTION:           Normal Ranges:  MV Peak E:             1.12 m/s  (0.7-1.2 m/s)  MV e'                  0.088 m/s (>8.0)  MV lateral e'          0.10 m/s  MV medial e'           0.08 m/s  E/e' Ratio:            12.77     (<8.0)       MITRAL VALVE:          Normal Ranges:  MV DT:        225 msec (150-240msec)       AORTIC VALVE:                      Normal Ranges:  AoV Vmax:                2.38 m/s  (<=1.7m/s)  AoV Peak P.7 mmHg (<20mmHg)  AoV Mean P.0 mmHg (1.7-11.5mmHg)  LVOT Max Elian:            1.12 m/s  (<=1.1m/s)  AoV VTI:                 42.60 cm  (18-25cm)  LVOT VTI:                17.20 cm  LVOT Diameter:           1.90 cm   (1.8-2.4cm)  AoV Area, VTI:           1.14 cm2  (2.5-5.5cm2)  AoV Area,Vmax:           1.33 cm2  (2.5-4.5cm2)  AoV Dimensionless Index: 0.40       RIGHT VENTRICLE:  TAPSE: 20.8 mm       TRICUSPID VALVE/RVSP:          Normal Ranges:  Peak TR Velocity:     3.12 m/s  RV Syst Pressure:     54 mmHg  (< 30mmHg)  IVC Diam:             2.80 cm       PULMONIC VALVE:          Normal Ranges:  PV Max Elian:     1.2 m/s  (0.6-0.9m/s)  PV Max P.0 mmHg       [id number] Thu Solis MD  Electronically signed on 2024 at 2:01:45 PM       ** Final **  ECG 12 lead  Ventricular-paced  rhythm  Abnormal ECG  When compared with ECG of 06-NOV-2024 06:16,  Previous ECG has undetermined rhythm, needs review  XR chest 1 view  Narrative: Interpreted By:  Tereso Hampton  and Brad Godfrey   STUDY:  XR CHEST 1 VIEW;  11/6/2024 4:53 pm      INDICATION:  Signs/Symptoms:intubation.          COMPARISON:  Chest x-ray 11/06/2024 3:12 p.m.      ACCESSION NUMBER(S):  [id number]      ORDERING CLINICIAN:  THUY PATTERSON      FINDINGS:  AP radiograph of the chest was provided.      Stable postoperative changes from TAVR. Left IJ cardiac pacing wire  with tip projecting over the right ventricle, similar as compared to  prior. Interval placement of an endotracheal tube with tip projecting  3.6 cm from jean-paul.      CARDIOMEDIASTINAL SILHOUETTE:  The heart is moderately severely enlarged stable in size and  configuration.      LUNGS:  Perihilar and bibasilar predominant opacities are mildly increased as  compared to prior. Left costophrenic angle is obscured by the  cardiomediastinal silhouette. Stable right subsegmental atelectasis.  The right costophrenic angle is clear. There is no pneumothorax.      ABDOMEN:  No remarkable upper abdominal findings.      BONES:  No acute osseous changes.      Impression: 1. Interval placement of an endotracheal tube in adequate position.  Other medical devices as described above.  2. Mild increase in interstitial and alveolar pulmonary edema.  3. Stable right basilar subsegmental atelectasis.      I personally reviewed the images/study and I agree with the findings  as stated by Bekah Salinas MD (PGY-2). This study was interpreted at  [facility], [place], [place].      MACRO:  None      Signed by: Tereso Hampton 11/7/2024 7:40 AM  Dictation workstation:   [id number]  Vascular US lower extremity pseudoaneurysm evaluation duplex bilateral  Narrative: Interpreted By:  Shelton Pearce and Liller Gregory   EXAMINATION:  VASC US LOWER EXTREMITY  PSEUDOANEURYSM EVAL DUPLEX BILATERAL;  11/6/2024 8:17 pm      ORDERING PROVIDER:  THUY PATTERSON      CLINICAL HISTORY:  68 y/o   F with  Signs/Symptoms:concern for pseudoaneurysm/hematoma  after groin access      COMPARISON:  None.      TECHNIQUE:  Multiple sonographic images of the left groin were obtained.      FINDINGS:  This examination is limited due to patient body habitus.          Limited ultrasound of the left groin within the area of concern  reveals a heterogeneous fluid collection with lace-like internal  echogenicity measuring 3.7 x 2.8 x 2.5 cm most consistent with  hematoma from recent groin access.      The hematoma is in close proximity to a vascular structure, likely  the femoral artery and vein.      No evidence of to and fro flow within the hematoma (Yin Villegas sign) to  suggest pseudoaneurysm.      Impression: A large hematoma is noted within the left groin measuring 3.7 x 2.8 x  2.5 cm which is in close proximity to vascular structures, likely  left common femoral artery and vein without evidence to suggest  pseudoaneurysm within limitations as described above. If there is  persistent clinical concern for pseudoaneurysm or active bleeding, a  CTA of the pelvis extending to the proximal thigh could be obtained  for further evaluation.      I personally reviewed the images/study and I agree with the findings  as stated above by resident physician, Dr. Benjamin Gómez.      Signed by: Shelton Pearce 11/7/2024 5:58 AM  Dictation workstation:   [id number]    Constitutional: Intubated female, improving with response to command  HEENT: Normocephalic, atraumatic. PERRL. EOMI.  Respiratory: CTA bilaterally. No wheezes, rales, or rhonchi. Normal respiratory effort.  Cardiovascular: Micra PPM placed. No murmurs, gallops, or rubs. No JVD. Radial pulses 2+.  Abdominal: Soft, nondistended. Has diffuse bruising on abdomen with TVP reinsertion in groin with left groin pseudoaneurysm.  Neuro: AOx3. No focal  deficit. UE and LE strength 5/5 bilaterally and sensation intact.   MSK: lower extremity wounds and bandaged left extremity  Skin: Warm, dry. No rashes or wounds.   Psych: Appropriate mood and affect.      Relevant Results  Scheduled medications  [Held by provider] amiodarone, 200 mg, oral, Daily  [Held by provider] apixaban, 5 mg, oral, BID  [START ON 11/8/2024] aspirin, 81 mg, oral, Daily  atorvastatin, 40 mg, oral, q AM  calcium acetate, 1,334 mg, oral, TID  clopidogrel, 75 mg, oral, Daily  fluticasone furoate-vilanteroL, 1 puff, inhalation, Daily  Glucommander - insulin glargine, 1-125 Units, subcutaneous, Daily   And  Glucommander - insulin lispro, 0-125 Units, subcutaneous, With meals & nightly  lactulose, 300 mL, rectal, Once  melatonin, 6 mg, oral, Nightly  meropenem, 500 mg, intravenous, q24h  oxygen, , inhalation, Continuous - Inhalation  pantoprazole, 40 mg, oral, Daily before breakfast  perflutren lipid microspheres, 0.5-10 mL of dilution, intravenous, Once in imaging  polyethylene glycol, 17 g, oral, Daily  sennosides-docusate sodium, 1 tablet, oral, Nightly  sodium zirconium cyclosilicate, 10 g, oral, q8h  vitamin B complex-vitamin C-folic acid, 1 capsule, oral, Daily      Continuous medications  fentaNYL,  mcg/hr, Last Rate: 100 mcg/hr (11/07/24 1314)  midazolam, 0-20 mg/hr, Last Rate: 2 mg/hr (11/07/24 1025)  norepinephrine, 0-0.2 mcg/kg/min, Last Rate: 0.15 mcg/kg/min (11/07/24 1537)  vasopressin, 0.03 Units/min, Last Rate: 0.03 Units/min (11/07/24 1052)      PRN medications  PRN medications: acetaminophen **OR** acetaminophen **OR** acetaminophen, acetaminophen, bisacodyl, dextrose, glucagon, glucagon, glucagon HCL, Glucommander - insulin glargine **AND** Glucommander - insulin lispro **AND** Glucommander - insulin lispro **AND** POCT Glucose, guaiFENesin, HYDROmorphone, melatonin, melatonin, midazolam, midazolam, ondansetron, polyethylene glycol, vancomycin    Results for orders placed or  performed during the hospital encounter of 10/28/24 (from the past 24 hours)   POCT GLUCOSE   Result Value Ref Range    POCT Glucose 184 (H) 74 - 99 mg/dL   Blood Gas Arterial Full Panel   Result Value Ref Range    POCT pH, Arterial 7.31 (L) 7.38 - 7.42 pH    POCT pCO2, Arterial 41 38 - 42 mm Hg    POCT pO2, Arterial 95 85 - 95 mm Hg    POCT SO2, Arterial 98 94 - 100 %    POCT Oxy Hemoglobin, Arterial 95.8 94.0 - 98.0 %    POCT Hematocrit Calculated, Arterial 31.0 (L) 36.0 - 46.0 %    POCT Sodium, Arterial 131 (L) 136 - 145 mmol/L    POCT Potassium, Arterial 5.8 (H) 3.5 - 5.3 mmol/L    POCT Chloride, Arterial 100 98 - 107 mmol/L    POCT Ionized Calcium, Arterial 1.07 (L) 1.10 - 1.33 mmol/L    POCT Glucose, Arterial 232 (H) 74 - 99 mg/dL    POCT Lactate, Arterial 4.3 (HH) 0.4 - 2.0 mmol/L    POCT Base Excess, Arterial -5.3 (L) -2.0 - 3.0 mmol/L    POCT HCO3 Calculated, Arterial 20.6 (L) 22.0 - 26.0 mmol/L    POCT Hemoglobin, Arterial 10.2 (L) 12.0 - 16.0 g/dL    POCT Anion Gap, Arterial 16 10 - 25 mmo/L    Patient Temperature 37.0 degrees Celsius    FiO2 50 %   CBC   Result Value Ref Range    WBC 22.5 (H) 4.4 - 11.3 x10*3/uL    nRBC 0.3 (H) 0.0 - 0.0 /100 WBCs    RBC 3.11 (L) 4.00 - 5.20 x10*6/uL    Hemoglobin 10.0 (L) 12.0 - 16.0 g/dL    Hematocrit 33.0 (L) 36.0 - 46.0 %     (H) 80 - 100 fL    MCH 32.2 26.0 - 34.0 pg    MCHC 30.3 (L) 32.0 - 36.0 g/dL    RDW 18.7 (H) 11.5 - 14.5 %    Platelets 91 (L) 150 - 450 x10*3/uL   Lactate   Result Value Ref Range    Lactate 5.2 (HH) 0.4 - 2.0 mmol/L   Transthoracic Echo (TTE) Limited   Result Value Ref Range    AV pk uvaldo 2.70 m/s    LVOT diam 1.70 cm    LV EF 55 %    Aortic Valve Area by Continuity of Peak Velocity 1.47 cm2    AV pk grad 29 mmHg   Basic metabolic panel   Result Value Ref Range    Glucose 240 (H) 74 - 99 mg/dL    Sodium 137 136 - 145 mmol/L    Potassium 6.0 (H) 3.5 - 5.3 mmol/L    Chloride 96 (L) 98 - 107 mmol/L    Bicarbonate 21 21 - 32 mmol/L    Anion  Gap 26 (H) 10 - 20 mmol/L    Urea Nitrogen 51 (H) 6 - 23 mg/dL    Creatinine 5.96 (H) 0.50 - 1.05 mg/dL    eGFR 7 (L) >60 mL/min/1.73m*2    Calcium 8.4 (L) 8.6 - 10.6 mg/dL   POCT GLUCOSE   Result Value Ref Range    POCT Glucose 219 (H) 74 - 99 mg/dL   Blood Gas Arterial Full Panel   Result Value Ref Range    POCT pH, Arterial 7.29 (L) 7.38 - 7.42 pH    POCT pCO2, Arterial 37 (L) 38 - 42 mm Hg    POCT pO2, Arterial 82 (L) 85 - 95 mm Hg    POCT SO2, Arterial 97 94 - 100 %    POCT Oxy Hemoglobin, Arterial 94.2 94.0 - 98.0 %    POCT Hematocrit Calculated, Arterial 29.0 (L) 36.0 - 46.0 %    POCT Sodium, Arterial 130 (L) 136 - 145 mmol/L    POCT Potassium, Arterial 6.1 (HH) 3.5 - 5.3 mmol/L    POCT Chloride, Arterial 99 98 - 107 mmol/L    POCT Ionized Calcium, Arterial 1.03 (L) 1.10 - 1.33 mmol/L    POCT Glucose, Arterial 386 (H) 74 - 99 mg/dL    POCT Lactate, Arterial 6.1 (HH) 0.4 - 2.0 mmol/L    POCT Base Excess, Arterial -8.1 (L) -2.0 - 3.0 mmol/L    POCT HCO3 Calculated, Arterial 17.8 (L) 22.0 - 26.0 mmol/L    POCT Hemoglobin, Arterial 9.8 (L) 12.0 - 16.0 g/dL    POCT Anion Gap, Arterial 19 10 - 25 mmo/L    Patient Temperature 37.0 degrees Celsius    FiO2 50 %   Lactate   Result Value Ref Range    Lactate 9.2 (HH) 0.4 - 2.0 mmol/L   Blood Gas Lactic Acid, Venous   Result Value Ref Range    POCT Lactate, Venous 8.3 (HH) 0.4 - 2.0 mmol/L   Type and screen   Result Value Ref Range    ABO TYPE B     Rh TYPE NEG     ANTIBODY SCREEN NEG    Coagulation Screen   Result Value Ref Range    Protime 12.9 (H) 9.8 - 12.8 seconds    INR 1.1 0.9 - 1.1    aPTT 29 27 - 38 seconds   Blood Gas Arterial Full Panel   Result Value Ref Range    POCT pH, Arterial 7.27 (L) 7.38 - 7.42 pH    POCT pCO2, Arterial 39 38 - 42 mm Hg    POCT pO2, Arterial 271 (H) 85 - 95 mm Hg    POCT SO2, Arterial 100 94 - 100 %    POCT Oxy Hemoglobin, Arterial 97.4 94.0 - 98.0 %    POCT Hematocrit Calculated, Arterial 31.0 (L) 36.0 - 46.0 %    POCT Sodium,  Arterial 132 (L) 136 - 145 mmol/L    POCT Potassium, Arterial 5.5 (H) 3.5 - 5.3 mmol/L    POCT Chloride, Arterial 99 98 - 107 mmol/L    POCT Ionized Calcium, Arterial 1.07 (L) 1.10 - 1.33 mmol/L    POCT Glucose, Arterial 184 (H) 74 - 99 mg/dL    POCT Lactate, Arterial 6.9 (HH) 0.4 - 2.0 mmol/L    POCT Base Excess, Arterial -8.4 (L) -2.0 - 3.0 mmol/L    POCT HCO3 Calculated, Arterial 17.9 (L) 22.0 - 26.0 mmol/L    POCT Hemoglobin, Arterial 10.2 (L) 12.0 - 16.0 g/dL    POCT Anion Gap, Arterial 21 10 - 25 mmo/L    Patient Temperature 37.0 degrees Celsius    FiO2 100 %   POCT GLUCOSE   Result Value Ref Range    POCT Glucose 147 (H) 74 - 99 mg/dL   BLOOD GAS ARTERIAL FULL PANEL   Result Value Ref Range    POCT pH, Arterial 7.35 (L) 7.38 - 7.42 pH    POCT pCO2, Arterial 37 (L) 38 - 42 mm Hg    POCT pO2, Arterial 118 (H) 85 - 95 mm Hg    POCT SO2, Arterial 99 94 - 100 %    POCT Oxy Hemoglobin, Arterial 96.5 94.0 - 98.0 %    POCT Hematocrit Calculated, Arterial 31.0 (L) 36.0 - 46.0 %    POCT Sodium, Arterial 129 (L) 136 - 145 mmol/L    POCT Potassium, Arterial 5.6 (H) 3.5 - 5.3 mmol/L    POCT Chloride, Arterial 100 98 - 107 mmol/L    POCT Ionized Calcium, Arterial 1.32 1.10 - 1.33 mmol/L    POCT Glucose, Arterial 140 (H) 74 - 99 mg/dL    POCT Lactate, Arterial 5.0 (HH) 0.4 - 2.0 mmol/L    POCT Base Excess, Arterial -4.7 (L) -2.0 - 3.0 mmol/L    POCT HCO3 Calculated, Arterial 20.4 (L) 22.0 - 26.0 mmol/L    POCT Hemoglobin, Arterial 10.2 (L) 12.0 - 16.0 g/dL    POCT Anion Gap, Arterial 14 10 - 25 mmo/L    Patient Temperature 37.0 degrees Celsius    FiO2 60 %   Blood Gas Arterial Full Panel   Result Value Ref Range    POCT pH, Arterial 7.40 7.38 - 7.42 pH    POCT pCO2, Arterial 38 38 - 42 mm Hg    POCT pO2, Arterial 318 (H) 85 - 95 mm Hg    POCT SO2, Arterial 100 94 - 100 %    POCT Oxy Hemoglobin, Arterial 97.4 94.0 - 98.0 %    POCT Hematocrit Calculated, Arterial 30.0 (L) 36.0 - 46.0 %    POCT Sodium, Arterial 132 (L) 136 -  145 mmol/L    POCT Potassium, Arterial 4.8 3.5 - 5.3 mmol/L    POCT Chloride, Arterial 100 98 - 107 mmol/L    POCT Ionized Calcium, Arterial 1.11 1.10 - 1.33 mmol/L    POCT Glucose, Arterial 127 (H) 74 - 99 mg/dL    POCT Lactate, Arterial 2.9 (H) 0.4 - 2.0 mmol/L    POCT Base Excess, Arterial -1.1 -2.0 - 3.0 mmol/L    POCT HCO3 Calculated, Arterial 23.5 22.0 - 26.0 mmol/L    POCT Hemoglobin, Arterial 9.9 (L) 12.0 - 16.0 g/dL    POCT Anion Gap, Arterial 13 10 - 25 mmo/L    Patient Temperature 37.0 degrees Celsius    FiO2 100 %   POCT GLUCOSE   Result Value Ref Range    POCT Glucose 110 (H) 74 - 99 mg/dL   Renal Function Panel   Result Value Ref Range    Glucose 162 (H) 74 - 99 mg/dL    Sodium 136 136 - 145 mmol/L    Potassium 5.8 (H) 3.5 - 5.3 mmol/L    Chloride 97 (L) 98 - 107 mmol/L    Bicarbonate 24 21 - 32 mmol/L    Anion Gap 21 (H) 10 - 20 mmol/L    Urea Nitrogen 44 (H) 6 - 23 mg/dL    Creatinine 5.52 (H) 0.50 - 1.05 mg/dL    eGFR 8 (L) >60 mL/min/1.73m*2    Calcium 8.4 (L) 8.6 - 10.6 mg/dL    Phosphorus 4.3 2.5 - 4.9 mg/dL    Albumin 3.0 (L) 3.4 - 5.0 g/dL   Blood Gas Lactic Acid, Venous   Result Value Ref Range    POCT Lactate, Venous 3.1 (H) 0.4 - 2.0 mmol/L   Magnesium   Result Value Ref Range    Magnesium 2.03 1.60 - 2.40 mg/dL   CBC and Auto Differential   Result Value Ref Range    WBC 20.6 (H) 4.4 - 11.3 x10*3/uL    nRBC 0.5 (H) 0.0 - 0.0 /100 WBCs    RBC 2.85 (L) 4.00 - 5.20 x10*6/uL    Hemoglobin 9.3 (L) 12.0 - 16.0 g/dL    Hematocrit 29.3 (L) 36.0 - 46.0 %     (H) 80 - 100 fL    MCH 32.6 26.0 - 34.0 pg    MCHC 31.7 (L) 32.0 - 36.0 g/dL    RDW 18.2 (H) 11.5 - 14.5 %    Platelets 49 (L) 150 - 450 x10*3/uL    Neutrophils % 84.3 40.0 - 80.0 %    Immature Granulocytes %, Automated 1.4 (H) 0.0 - 0.9 %    Lymphocytes % 6.5 13.0 - 44.0 %    Monocytes % 7.6 2.0 - 10.0 %    Eosinophils % 0.1 0.0 - 6.0 %    Basophils % 0.1 0.0 - 2.0 %    Neutrophils Absolute 17.34 (H) 1.20 - 7.70 x10*3/uL    Immature  Granulocytes Absolute, Automated 0.28 0.00 - 0.70 x10*3/uL    Lymphocytes Absolute 1.34 1.20 - 4.80 x10*3/uL    Monocytes Absolute 1.57 (H) 0.10 - 1.00 x10*3/uL    Eosinophils Absolute 0.03 0.00 - 0.70 x10*3/uL    Basophils Absolute 0.03 0.00 - 0.10 x10*3/uL   ECG 12 lead   Result Value Ref Range    Ventricular Rate 80 BPM    Atrial Rate 90 BPM    QRS Duration 168 ms    QT Interval 450 ms    QTC Calculation(Bazett) 519 ms    R Axis 58 degrees    T Axis 259 degrees    QRS Count 13 beats    Q Onset 194 ms    T Offset 419 ms    QTC Fredericia 495 ms   POCT GLUCOSE   Result Value Ref Range    POCT Glucose 187 (H) 74 - 99 mg/dL   Blood Gas Arterial Full Panel   Result Value Ref Range    POCT pH, Arterial 7.38 7.38 - 7.42 pH    POCT pCO2, Arterial 33 (L) 38 - 42 mm Hg    POCT pO2, Arterial 164 (H) 85 - 95 mm Hg    POCT SO2, Arterial 99 94 - 100 %    POCT Oxy Hemoglobin, Arterial 96.7 94.0 - 98.0 %    POCT Hematocrit Calculated, Arterial 28.0 (L) 36.0 - 46.0 %    POCT Sodium, Arterial 130 (L) 136 - 145 mmol/L    POCT Potassium, Arterial 5.7 (H) 3.5 - 5.3 mmol/L    POCT Chloride, Arterial 99 98 - 107 mmol/L    POCT Ionized Calcium, Arterial 1.08 (L) 1.10 - 1.33 mmol/L    POCT Glucose, Arterial 197 (H) 74 - 99 mg/dL    POCT Lactate, Arterial 3.5 (H) 0.4 - 2.0 mmol/L    POCT Base Excess, Arterial -5.0 (L) -2.0 - 3.0 mmol/L    POCT HCO3 Calculated, Arterial 19.5 (L) 22.0 - 26.0 mmol/L    POCT Hemoglobin, Arterial 9.4 (L) 12.0 - 16.0 g/dL    POCT Anion Gap, Arterial 17 10 - 25 mmo/L    Patient Temperature 37.0 degrees Celsius    FiO2 60 %   Renal function panel   Result Value Ref Range    Glucose 202 (H) 74 - 99 mg/dL    Sodium 136 136 - 145 mmol/L    Potassium 5.9 (H) 3.5 - 5.3 mmol/L    Chloride 98 98 - 107 mmol/L    Bicarbonate 22 21 - 32 mmol/L    Anion Gap 22 (H) 10 - 20 mmol/L    Urea Nitrogen 46 (H) 6 - 23 mg/dL    Creatinine 5.60 (H) 0.50 - 1.05 mg/dL    eGFR 8 (L) >60 mL/min/1.73m*2    Calcium 8.2 (L) 8.6 - 10.6 mg/dL     Phosphorus 4.1 2.5 - 4.9 mg/dL    Albumin 2.9 (L) 3.4 - 5.0 g/dL   POCT GLUCOSE   Result Value Ref Range    POCT Glucose 173 (H) 74 - 99 mg/dL   POCT GLUCOSE   Result Value Ref Range    POCT Glucose 236 (H) 74 - 99 mg/dL   Blood Gas Arterial Full Panel   Result Value Ref Range    POCT pH, Arterial 7.33 (L) 7.38 - 7.42 pH    POCT pCO2, Arterial 38 38 - 42 mm Hg    POCT pO2, Arterial 173 (H) 85 - 95 mm Hg    POCT SO2, Arterial 99 94 - 100 %    POCT Oxy Hemoglobin, Arterial 96.8 94.0 - 98.0 %    POCT Hematocrit Calculated, Arterial 28.0 (L) 36.0 - 46.0 %    POCT Sodium, Arterial 128 (L) 136 - 145 mmol/L    POCT Potassium, Arterial 5.7 (H) 3.5 - 5.3 mmol/L    POCT Chloride, Arterial 99 98 - 107 mmol/L    POCT Ionized Calcium, Arterial 1.13 1.10 - 1.33 mmol/L    POCT Glucose, Arterial 222 (H) 74 - 99 mg/dL    POCT Lactate, Arterial 3.9 (H) 0.4 - 2.0 mmol/L    POCT Base Excess, Arterial -5.5 (L) -2.0 - 3.0 mmol/L    POCT HCO3 Calculated, Arterial 20.0 (L) 22.0 - 26.0 mmol/L    POCT Hemoglobin, Arterial 9.4 (L) 12.0 - 16.0 g/dL    POCT Anion Gap, Arterial 15 10 - 25 mmo/L    Patient Temperature 37.0 degrees Celsius    FiO2 60 %   Prepare Platelets: 1 Units   Result Value Ref Range    PRODUCT CODE L2620Y38     Unit Number [id number]     Unit ABO A     Unit RH POS     Dispense Status IS     Blood Expiration Date 11/7/2024 11:59:00 PM EST     PRODUCT BLOOD TYPE 6200     UNIT VOLUME 243    Glucose   Result Value Ref Range    Glucose 221 (H) 74 - 99 mg/dL   CBC and Auto Differential   Result Value Ref Range    WBC 16.5 (H) 4.4 - 11.3 x10*3/uL    nRBC 0.5 (H) 0.0 - 0.0 /100 WBCs    RBC 2.91 (L) 4.00 - 5.20 x10*6/uL    Hemoglobin 9.3 (L) 12.0 - 16.0 g/dL    Hematocrit 28.7 (L) 36.0 - 46.0 %    MCV 99 80 - 100 fL    MCH 32.0 26.0 - 34.0 pg    MCHC 32.4 32.0 - 36.0 g/dL    RDW 18.3 (H) 11.5 - 14.5 %    Platelets 40 (LL) 150 - 450 x10*3/uL    Neutrophils % 79.5 40.0 - 80.0 %    Immature Granulocytes %, Automated 1.2 (H) 0.0  - 0.9 %    Lymphocytes % 8.6 13.0 - 44.0 %    Monocytes % 10.1 2.0 - 10.0 %    Eosinophils % 0.5 0.0 - 6.0 %    Basophils % 0.1 0.0 - 2.0 %    Neutrophils Absolute 13.07 (H) 1.20 - 7.70 x10*3/uL    Immature Granulocytes Absolute, Automated 0.20 0.00 - 0.70 x10*3/uL    Lymphocytes Absolute 1.42 1.20 - 4.80 x10*3/uL    Monocytes Absolute 1.67 (H) 0.10 - 1.00 x10*3/uL    Eosinophils Absolute 0.09 0.00 - 0.70 x10*3/uL    Basophils Absolute 0.02 0.00 - 0.10 x10*3/uL   Renal function panel   Result Value Ref Range    Glucose 220 (H) 74 - 99 mg/dL    Sodium 135 (L) 136 - 145 mmol/L    Potassium 6.0 (H) 3.5 - 5.3 mmol/L    Chloride 97 (L) 98 - 107 mmol/L    Bicarbonate 24 21 - 32 mmol/L    Anion Gap 20 10 - 20 mmol/L    Urea Nitrogen 51 (H) 6 - 23 mg/dL    Creatinine 6.15 (H) 0.50 - 1.05 mg/dL    eGFR 7 (L) >60 mL/min/1.73m*2    Calcium 8.6 8.6 - 10.6 mg/dL    Phosphorus 4.6 2.5 - 4.9 mg/dL    Albumin 3.1 (L) 3.4 - 5.0 g/dL   POCT GLUCOSE   Result Value Ref Range    POCT Glucose 222 (H) 74 - 99 mg/dL   Blood culture, peripheral #1    Specimen: Peripheral Venipuncture; Blood culture   Result Value Ref Range    Blood Culture Loaded on Instrument - Culture in progress    Blood Gas Arterial Full Panel Unsolicited   Result Value Ref Range    POCT pH, Arterial 7.46 (H) 7.38 - 7.42 pH    POCT pCO2, Arterial 24 (L) 38 - 42 mm Hg    POCT pO2, Arterial 341 (H) 85 - 95 mm Hg    POCT SO2, Arterial 100 94 - 100 %    POCT Oxy Hemoglobin, Arterial 97.4 94.0 - 98.0 %    POCT Hematocrit Calculated, Arterial 26.0 (L) 36.0 - 46.0 %    POCT Sodium, Arterial 131 (L) 136 - 145 mmol/L    POCT Potassium, Arterial 5.3 3.5 - 5.3 mmol/L    POCT Chloride, Arterial 103 98 - 107 mmol/L    POCT Ionized Calcium, Arterial 1.03 (L) 1.10 - 1.33 mmol/L    POCT Glucose, Arterial 216 (H) 74 - 99 mg/dL    POCT Lactate, Arterial 2.3 (H) 0.4 - 2.0 mmol/L    POCT Base Excess, Arterial -5.7 (L) -2.0 - 3.0 mmol/L    POCT HCO3 Calculated, Arterial 17.1 (L) 22.0 - 26.0  mmol/L    POCT Hemoglobin, Arterial 8.6 (L) 12.0 - 16.0 g/dL    POCT Anion Gap, Arterial 16 10 - 25 mmo/L    Patient Temperature 37.0 degrees Celsius   POCT GLUCOSE   Result Value Ref Range    POCT Glucose 246 (H) 74 - 99 mg/dL       XR chest 1 view    Result Date: 11/7/2024  Interpreted By:  Field, Tereso,  and Natalya Yaritza STUDY: XR CHEST 1 VIEW;  11/6/2024 7:07 pm   INDICATION: Signs/Symptoms:saturation.   COMPARISON: Same day chest radiograph time stamped 4:28 p.m.   ACCESSION NUMBER(S): [id number]   ORDERING CLINICIAN: THUY PATTERSON   FINDINGS: AP radiograph of the chest was provided.   Endotracheal tube in place with tip projecting 4.8 cm above the jean-paul. Enteric tube courses over the mid-thorax and below the diaphragm with tip projecting over the gastric body. Left internal jugular approach transvenous pacing wire in similar positioning. Status post TAVR.   CARDIOMEDIASTINAL SILHOUETTE: Moderate to moderately large cardiomegaly. Cardiomediastinal silhouette is stable in size and configuration.   LUNGS: Hazy interstitial opacities throughout the bilateral lungs, similar to prior. Mild blunting of the left costophrenic angle. The right costophrenic angle is excluded from the field of view. Prominence of the right perihilar structures. No pneumothorax.   ABDOMEN: No remarkable upper abdominal findings.   BONES: No acute osseous abnormality.       1. Hazy interstitial edema and atelectasis, similar to prior, with a trace left pleural effusion. 2. Prominence of the right perihilar structures may also indicate interstitial edema and atelectasis or vascular congestion. 3. Medical devices as above.   I personally reviewed the image(s)/study and resident interpretation as stated by Dr. Yaritza Hoang MD. I agree with the findings as stated. This study was interpreted at [facility], [place], [place].   MACRO: None   Signed by: Tereso Hampton 11/7/2024 3:49 PM Dictation  workstation:   [id number]    Vascular US Lower Extremity Pseudoaneurysm Evaluation Duplex Left    Result Date: 11/7/2024            [place] [address]   Tel [phone] and Fax [phone]  Vascular Lab Report [facility] US LOWER EXTREMITY PSEUDOANEURYSM DUPLEX LEFT  Patient Name:      JUDY SAGASTUME       Reading Physician:  [id number] Vaughn Castillo DO Study Date:        11/7/2024            Ordering Physician: [id number] PATTERSON MRN/PID:           [id number]             Technologist:       Sandoval Santana RVT, [facility] Accession#:        [id number]         Technologist 2: Date of Birth/Age: 1955 / 69 years Encounter#:         [id number] Gender:            F Admission Status:  Inpatient            Location Performed: [facility]  Diagnosis/ICD: Atherosclerosis of other arteries-I70.8 CPT Codes:     24851 Peripheral artery Lower arterial Duplex limited  Patient History R/O Pseudo left.  **CRITICAL RESULT** Critical Result: Lt DEIA psesudo Notification called to Sr. Patterson on 11/7/2024 at 9:50:00 AM by HP at bedside.  CONCLUSIONS: Left Lower Arterial: The pseudoaneurism is noted to measures aprox. 1.6x1.3x2.5cm. 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 No evidence of DVT in the visualized vessels.  Imaging & Doppler Findings:  Right       Left  PSV        PSV        cm/s       CFA 88 cm/s       SFA 81 cm/s       PFA 39 cm/s  Left Pseudo Aneurysm                     Diam Left Pseudoaneurysm 2.5 cm  [id number] Vaughn Castillo DO Electronically signed by [id number] Castillo DO on 11/7/2024 at 1:00:21 PM  ** Final **     ECG 12 lead daily    Result Date: 11/7/2024  Undetermined rhythm ST & T wave abnormality, consider lateral ischemia Prolonged  QT Abnormal ECG When compared with ECG of 05-NOV-2024 12:49, Current undetermined rhythm precludes rhythm comparison, needs review    Transthoracic Echo (TTE) Limited    Result Date: 11/7/2024   [facility], [address] [place] [address]                Tel [phone] and Fax [phone] TRANSTHORACIC ECHOCARDIOGRAM REPORT  Patient Name:       JUDY SAGASTUME      Reading Physician:    [id number] Thu Solis MD Study Date:         11/6/2024           Ordering Provider:    [id number] WILI LIVE MRN/PID:            [id number]            Fellow: Accession#:         [id number]        Nurse: Date of Birth/Age:  1955 / 69      Sonographer:          Tati guido RDCS Gender assigned at  F                   Additional Staff: Birth: Height:             162.56 cm           Admit Date:           11/5/2024 Weight:             123.38 kg           Admission Status:     Inpatient -                                                               Routine BSA / BMI:          2.23 m2 / 46.69     Encounter#:           [id number]                     kg/m2 Blood Pressure:     131/98 mmHg         Department Location:  [facility] Study Type:    TRANSTHORACIC ECHO (TTE) LIMITED Diagnosis/ICD: 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 Diabetes:        Yes Study Detail: The following Echo studies were performed: 2D, M-Mode, Doppler and               color flow. Definity used as a contrast agent for endocardial               border definition. Total contrast used for this procedure was 1 mL               via IV push.  PHYSICIAN INTERPRETATION: Left Ventricle: Left ventricular ejection fraction is mildly  decreased, calculated by Daniels's biplane at 52%. There are no regional left ventricular wall motion abnormalities. The left ventricular cavity size is normal. Left ventricular diastolic filling cannot be determined, due to atrial fibrillation/flutter. Left Atrium: The left atrium was not assessed. Right Ventricle: The right ventricle was not well visualized. There is reduced right ventricular systolic function. A device is visualized in the right ventricle. Right Atrium: The right atrium was not well visualized. There is a device visualized in the right atrium. Aortic Valve: There is a prosthetic aortic valve present. The aortic valve dimensionless index is 0.40. There is a Medtronic transcatheter aortic valve bioprosthesis with a 29 mm reported size. There is mild aortic valve regurgitation. The peak instantaneous gradient of the aortic valve is 23 mmHg. The mean gradient of the aortic valve is 11 mmHg. S/p 29mm Medtronic Evolut FX+ TAVR with gradients of 23/11mmHg and mild paravalvlar AI. ( NOte4 gradinets and VTI averaged due to AFib). Mitral Valve: The mitral valve is normal in structure. There is moderate mitral annular calcification. There is trace mitral valve regurgitation. Tricuspid Valve: The tricuspid valve is structurally normal. There is mild tricuspid regurgitation. The Doppler estimated RVSP is moderately elevated at 53.9 mmHg. Pulmonic Valve: The pulmonic valve is not well visualized. There is physiologic pulmonic valve regurgitation. Pericardium: Trivial pericardial effusion. Aorta: The aortic root is normal. Systemic Veins: The inferior vena cava appears dilated, with IVC inspiratory collapse less than 50%. Line noted within the IVC. In comparison to the previous echocardiogram(s): Compared with study dated 11/5/2024,. Compared with the periprocedural echo from 11/5/2024 the post TAVR gradients were11/5mmHg and are higher today. There was already mild perivalvular AI at that time. LVEF could not  be seen on the prior exam.  CONCLUSIONS:  1. Poorly visualized anatomical structures due to suboptimal image quality.  2. Left ventricular ejection fraction is mildly decreased, calculated by Daniels's biplane at 52%.  3. Left ventricular diastolic filling cannot be determined, due to atrial fibrillation/flutter.  4. There is reduced right ventricular systolic function.  5. There is moderate mitral annular calcification.  6. Moderately elevated right ventricular systolic pressure.  7. S/p 29mm Medtronic Evolut FX+ TAVR with gradients of 23/11mmHg and mild paravalvlar AI. ( NOte4 gradinets and VTI averaged due to AFib).  8. There is a Medtronic transcatheter aortic valve bioprosthesis with a 29 mm reported size.  9. Mild aortic valve regurgitation. 10. Compared with the periprocedural echo from 11/5/2024 the post TAVR gradients were11/5mmHg and are higher today. There was already mild perivalvular AI at that time. LVEF could not be seen on the prior exam. RECOMMENDATIONS: Utilizing an FDA cleared automated machine learning algorithm (EchoGo Heart Failure by [employer]), the analysis of the apical 4-chamber echocardiogram suggests the presence of heart failure with preserved ejection fraction (HFpEF)*. Clinical correlation looking for additional heart failure signs and symptoms is recommended, as a definite diagnosis of heart failure cannot be made by imaging alone. *Per ACC/AHA/HFSA universal diagnosis of heart failure, HFpEF is defined as 1) signs and symptoms leading to clinical diagnosis of heart failure, 2) an ejection fraction of at least 50%, and 3) evidence of elevated intra-cardiac filling pressures by echocardiography, BNP elevation, or catheterization.  QUANTITATIVE DATA SUMMARY:  2D MEASUREMENTS:          Normal Ranges: LAs:             3.50 cm  (2.7-4.0cm) IVSd:            0.90 cm  (0.6-1.1cm) LVPWd:           1.10 cm  (0.6-1.1cm) LVIDd:           5.10 cm  (3.9-5.9cm) LVIDs:           3.50 cm LV Mass  Index:   84 g/m2 LVEDV Index:     68 ml/m2 LV % FS          31.4 %  AORTA MEASUREMENTS:         Normal Ranges: Ao Sinus, d:        2.40 cm (2.1-3.5cm) Asc Ao, d:          2.80 cm (2.1-3.4cm)  LV SYSTOLIC FUNCTION BY 2D PLANIMETRY (MOD):                      Normal Ranges: EF-A4C View:    51 % (>=55%) EF-A2C View:    54 % EF-Biplane:     52 % LV EF Reported: 52 %  LV DIASTOLIC FUNCTION:           Normal Ranges: MV Peak E:             1.12 m/s  (0.7-1.2 m/s) MV e'                  0.088 m/s (>8.0) MV lateral e'          0.10 m/s MV medial e'           0.08 m/s E/e' Ratio:            12.77     (<8.0)  MITRAL VALVE:          Normal Ranges: MV DT:        225 msec (150-240msec)  AORTIC VALVE:                      Normal Ranges: AoV Vmax:                2.38 m/s  (<=1.7m/s) AoV Peak P.7 mmHg (<20mmHg) AoV Mean P.0 mmHg (1.7-11.5mmHg) LVOT Max Elian:            1.12 m/s  (<=1.1m/s) AoV VTI:                 42.60 cm  (18-25cm) LVOT VTI:                17.20 cm LVOT Diameter:           1.90 cm   (1.8-2.4cm) AoV Area, VTI:           1.14 cm2  (2.5-5.5cm2) AoV Area,Vmax:           1.33 cm2  (2.5-4.5cm2) AoV Dimensionless Index: 0.40  RIGHT VENTRICLE: TAPSE: 20.8 mm  TRICUSPID VALVE/RVSP:          Normal Ranges: Peak TR Velocity:     3.12 m/s RV Syst Pressure:     54 mmHg  (< 30mmHg) IVC Diam:             2.80 cm  PULMONIC VALVE:          Normal Ranges: PV Max Elian:     1.2 m/s  (0.6-0.9m/s) PV Max P.0 mmHg  [id number] Thu Solis MD Electronically signed on 2024 at 2:01:45 PM  ** Final **     ECG 12 lead    Result Date: 2024  Ventricular-paced rhythm Abnormal ECG When compared with ECG of 2024 06:16, Previous ECG has undetermined rhythm, needs review    XR chest 1 view    Result Date: 2024  Interpreted By:  Tereso Hampton and Omar Mahmoud STUDY: XR CHEST 1 VIEW;  2024 4:53 pm   INDICATION: Signs/Symptoms:intubation.     COMPARISON: Chest x-ray 2024 3:12 p.m.    ACCESSION NUMBER(S): [id number]   ORDERING CLINICIAN: THUY PATTERSON   FINDINGS: AP radiograph of the chest was provided.   Stable postoperative changes from TAVR. Left IJ cardiac pacing wire with tip projecting over the right ventricle, similar as compared to prior. Interval placement of an endotracheal tube with tip projecting 3.6 cm from jean-paul.   CARDIOMEDIASTINAL SILHOUETTE: The heart is moderately severely enlarged stable in size and configuration.   LUNGS: Perihilar and bibasilar predominant opacities are mildly increased as compared to prior. Left costophrenic angle is obscured by the cardiomediastinal silhouette. Stable right subsegmental atelectasis. The right costophrenic angle is clear. There is no pneumothorax.   ABDOMEN: No remarkable upper abdominal findings.   BONES: No acute osseous changes.       1. Interval placement of an endotracheal tube in adequate position. Other medical devices as described above. 2. Mild increase in interstitial and alveolar pulmonary edema. 3. Stable right basilar subsegmental atelectasis.   I personally reviewed the images/study and I agree with the findings as stated by Bekah Salinas MD (PGY-2). This study was interpreted at [place], [place].   MACRO: None   Signed by: Tereso Hampton 11/7/2024 7:40 AM Dictation workstation:   [id number]    Vascular US lower extremity pseudoaneurysm evaluation duplex bilateral    Result Date: 11/7/2024  Interpreted By:  Shelton Pearce and Liller Gregory EXAMINATION: [facility] US LOWER EXTREMITY PSEUDOANEURYSM EVAL DUPLEX BILATERAL; 11/6/2024 8:17 pm   ORDERING PROVIDER: THUY PATTERSON   CLINICAL HISTORY: 70 y/o   F with  Signs/Symptoms:concern for pseudoaneurysm/hematoma after groin access   COMPARISON: None.   TECHNIQUE: Multiple sonographic images of the left groin were obtained.   FINDINGS: This examination is limited due to patient body habitus.     Limited ultrasound of the left groin  within the area of concern reveals a heterogeneous fluid collection with lace-like internal echogenicity measuring 3.7 x 2.8 x 2.5 cm most consistent with hematoma from recent groin access.   The hematoma is in close proximity to a vascular structure, likely the femoral artery and vein.   No evidence of to and fro flow within the hematoma (Yin Villegas sign) to suggest pseudoaneurysm.       A large hematoma is noted within the left groin measuring 3.7 x 2.8 x 2.5 cm which is in close proximity to vascular structures, likely left common femoral artery and vein without evidence to suggest pseudoaneurysm within limitations as described above. If there is persistent clinical concern for pseudoaneurysm or active bleeding, a CTA of the pelvis extending to the proximal thigh could be obtained for further evaluation.   I personally reviewed the images/study and I agree with the findings as stated above by resident physician, Dr. Benjamin Gómez.   Signed by: Shelton Pearce 11/7/2024 5:58 AM Dictation workstation:   [id number]    Cardiac Catheterization Procedure    Result Date: 11/6/2024   [facility], Cath Lab, [address] [place] [address] Cardiovascular Catheterization Report Patient Name:      JUDY SAGASTUME       Performing Physician:  [id number] Naman Maher DO Study Date:        11/6/2024            Verifying Physician:   [id number] Maher DO MRN/PID:           [id number]             Cardiologist/Co-Scrub: Accession#:        [id number]         Ordering Provider:     MELY INVASIVE                                                                CARDIOLOGIST                                                                LYUBOV Date of Birth/Age: 1955 / 69 years Cardiologist: Gender:            F                    Fellow:                [id number] Gt                                                                 Ace HARRIS Admit Date:                             Fellow:                [id number] Richard Steele MD Encounter#:        [id number]           Surgeon:  Study: TVP  Indications: Suspected coronary artery disease. Medical History: Frailty status of patient entering lab: 4 = Vulnerable.  Procedure Description: After infiltration of local anesthetic, the left internal jugular vein was identified with two-dimensional ultrasound. Under direct ultrasound visualization, the left internal jugular vein was cannulated with a micropuncture technique. A 8 Georgian sheath was placed in the vein. Post-procedure, the venous sheath was left intact and sutured in place.  Procedure Description Comments: After identifying the left internal jugular vein using ultrasound we proceeded with placing a microcatheter sheath confirmed under fluoroscopy the course of the wire in the RA we then upsized to 8 Georgian sheath was placed. We then floated a temporarily RV wire to the apex of the RV. The pacing rate and output was tested and the output was lost at 3 mV. The pacer was set at 70. Patient tolerated procedure very well.  Hemo Personnel: +--------------------+---------+ Name                Duty      +--------------------+---------+ Naman Maher MD 1 +--------------------+---------+  Complications: No in-lab complications observed.  Cardiac Cath Post Procedure Notes: Post Procedure Diagnosis: S/p TVP emergent placement. Blood Loss:               Estimated blood loss during the procedure was 10cc                           mls. Specimens Removed:        Number of specimen(s) removed: none. ____________________________________________________________________________________ CONCLUSIONS:  1. Indication: Complete heart block post TAVR.  2. S/p left IJ TVP placement, rate set at 70bpm. ICD 10 Codes: Atrioventricular block,  complete-I44.2  CPT Codes: Insertion of temporary single chamber cardiac electrode or pacemaker catheter (separate procedure)-14422  [id number] Naman Patterson DO Performing Physician Electronically signed by [id number] Naman Patterson DO on 11/6/2024 at 9:21:06 PM  ** Final **     Transthoracic Echo (TTE) Limited    Result Date: 11/6/2024   [facility] [address] [place] [address]                Tel [phone] and Fax [phone] TRANSTHORACIC ECHOCARDIOGRAM REPORT  Patient Name:       JUDY SAGASTUME      Reading Physician:    [id number] Dev Muñoz MD Study Date:         11/6/2024           Ordering Provider:    [id number] NAMAN PATTERSON MRN/PID:            [id number]            Fellow: Accession#:         [id number]        Nurse: Date of Birth/Age:  1955 / 69      Sonographer:          Tanya guido RDCS Gender assigned at  F                   Additional Staff: Birth: Height:             162.56 cm           Admit Date:           10/28/2024 Weight:             123.38 kg           Admission Status:     Inpatient - STAT BSA / BMI:          2.23 m2 / 46.69     Encounter#:           [id number]                     kg/m2 Blood Pressure:     119/76 mmHg         Department Location:  [facility] Study Type:    TRANSTHORACIC ECHO (TTE) LIMITED Diagnosis/ICD: Nonrheumatic aortic (valve) stenosis-I35.0 Indication:    Check for effusion CPT Code:      Echo Limited-99260; Doppler Limited-22404; Color Doppler-13641 Patient History: Pertinent History: ESRD on IHD, CAD, s/p PCI, lymphedema, chronic hypoxemia, DM,                    HLD, HTN, HFmrEF, afib, AS s/p TAVR #29mm Evolut FX                    (11/5/24). Study Detail: The following Echo studies were performed: 2D, M-Mode, Doppler and               color flow.  Technically challenging study due to body habitus,               patient lying in supine position and prominent lung artifact. The               patient is intubated.  PHYSICIAN INTERPRETATION: Left Ventricle: Left ventricular ejection fraction is low normal, by visual estimate at 55%. The left ventricular cavity size is normal. Abnormal (paradoxical) septal motion, consistent with an intraventricular conduction delay. Left ventricular diastolic filling was not assessed. Left Atrium: The left atrium is enlarged. Right Ventricle: The right ventricle is mildly enlarged. There is low normal right ventricular systolic function. Right Atrium: The right atrium is upper limits of normal in size. Aortic Valve: There is a prosthetic aortic valve present. There is Evolut transcatheter aortic valve bioprosthesis. There is trace aortic valve regurgitation. The peak instantaneous gradient of the aortic valve is 29 mmHg. Mitral Valve: The mitral valve is mildly thickened. There is mild to moderate mitral annular calcification. There is trace mitral valve regurgitation. Tricuspid Valve: The tricuspid valve was not well visualized. There is trace tricuspid regurgitation. Pulmonic Valve: The pulmonic valve is structurally normal. There is trace pulmonic valve regurgitation. Pericardium: Trivial pericardial effusion. Aorta: The aortic root was not well visualized. There is no dilatation of the aortic root. In comparison to the previous echocardiogram(s): Compared with study dated 11/6/2024, no significant change.  CONCLUSIONS:  1. Poorly visualized anatomical structures due to suboptimal image quality.  2. Left ventricular ejection fraction is low normal, by visual estimate at 55%.  3. Mildly enlarged right ventricle.  4. There is low normal right ventricular systolic function.  5. There is Evolut transcatheter aortic valve bioprosthesis. RECOMMENDATIONS: Utilizing an FDA cleared automated machine learning algorithm (EchoGo Heart  Failure by [employer]), the analysis of the apical 4-chamber echocardiogram suggests the presence of heart failure with preserved ejection fraction (HFpEF)*. Clinical correlation looking for additional heart failure signs and symptoms is recommended, as a definite diagnosis of heart failure cannot be made by imaging alone. *Per ACC/AHA/HFSA universal diagnosis of heart failure, HFpEF is defined as 1) signs and symptoms leading to clinical diagnosis of heart failure, 2) an ejection fraction of at least 50%, and 3) evidence of elevated intra-cardiac filling pressures by echocardiography, BNP elevation, or catheterization.  QUANTITATIVE DATA SUMMARY:  AORTA MEASUREMENTS:         Normal Ranges: Ao Sinus, d:        2.80 cm (2.1-3.5cm)  LV SYSTOLIC FUNCTION BY 2D PLANIMETRY (MOD):                      Normal Ranges: EF-Visual:      55 % LV EF Reported: 55 %  AORTIC VALVE:            Normal Ranges: AoV Vmax:      2.70 m/s  (<=1.7m/s) AoV Peak P.2 mmHg (<20mmHg) LVOT Max Elian:  1.75 m/s  (<=1.1m/s) LVOT VTI:      19.50 cm LVOT Diameter: 1.70 cm   (1.8-2.4cm) AoV Area,Vmax: 1.47 cm2  (2.5-4.5cm2)  [id number] Dev Muñoz MD Electronically signed on 2024 at 4:58:00 PM  ** Final **     XR chest 1 view    Result Date: 2024  Interpreted By:  Tereso Hampton and Omar Mahmoud STUDY: XR CHEST 1 VIEW;  2024 3:20 pm   INDICATION: Signs/Symptoms:placement of tvp.     COMPARISON: Chest x-ray 2022 11 p.m.   ACCESSION NUMBER(S): [id number]   ORDERING CLINICIAN: THUY PATTERSON   FINDINGS: AP radiograph of the chest was provided.   Interval retraction of the left IJ approach cardiac pacing were with tip now projecting within the expected location of the right ventricle. Status post TAVR.   CARDIOMEDIASTINAL SILHOUETTE: Cardiomediastinal silhouette is stable in size and configuration.   LUNGS: The lungs are hypoexpanded with associated bronchovascular crowding. There is stable diffuse interstitial prominence. Stable  right subsegmental basilar atelectasis. There is no new focal consolidation. There is no pneumothorax. The costophrenic angles clear. The left costophrenic is again obscured by the cardiomediastinal silhouette.   ABDOMEN: No remarkable upper abdominal findings.   BONES: No acute osseous changes.       1. Interval retraction of left IJ approach cardiac pacing wire with tip now projecting over the right ventricle. 2. Stable pulmonary interstitial edema. 3. Stable right basilar subsegmental atelectasis.   I personally reviewed the images/study and I agree with the findings as stated by Bekah Salinas MD (PGY-2). This study was interpreted at [place], [place].   MACRO: None   Signed by: Tereso Hampton 11/6/2024 4:08 PM Dictation workstation:   [id number]    XR chest 1 view    Result Date: 11/6/2024  Interpreted By:  Tereso Hampton and Omar Mahmoud STUDY: XR CHEST 1 VIEW;  11/6/2024 2:57 pm   INDICATION: Signs/Symptoms:tvp.     COMPARISON: Chest x-ray 11/06/2024 9:06 a.m.   ACCESSION NUMBER(S): [id number]   ORDERING CLINICIAN: THUY PATTERSON   FINDINGS: AP radiograph of the chest was provided.   Interval removal of an inferior approach cardiac pacing wire. Interval placement of a left internal jugular venous approach cardiac pacing wire with tip projecting over the expected location of the distal main pulmonary artery. Status post TAVR.   CARDIOMEDIASTINAL SILHOUETTE: Cardiomediastinal silhouette is stable in size and configuration.   LUNGS: The lungs are hypoexpanded with associated bronchovascular crowding. Diffuse interstitial prominence, stable as compared to prior. Stable right subsegmental atelectasis. There is no new focal consolidation. There is no pneumothorax. The left costophrenic angle is again partially obscured by the cardiomediastinal silhouette. The right costophrenic angle is clear.   ABDOMEN: No remarkable upper abdominal findings.   BONES: No acute osseous  changes.       1. Interval placement of a left IJ approach cardiac pacer with tip projecting over the distal main pulmonary artery. 2. Stable mild pulmonary interstitial edema. 3. Stable right subsegmental atelectasis.   I personally reviewed the images/study and I agree with the findings as stated by Bekah Salinas MD (PGY-2). This study was interpreted at [place], [place].   MACRO: None   Signed by: Tereso Hampton 11/6/2024 4:07 PM Dictation workstation:   [id number]    XR chest 1 view    Result Date: 11/6/2024  Interpreted By:  Tereso Hampton and Omar Mahmoud STUDY: XR CHEST 1 VIEW;  11/6/2024 9:06 am   INDICATION: Signs/Symptoms:s/p TAVR.     COMPARISON: Chest x-ray 11/05/2024 1:10 p.m.   ACCESSION NUMBER(S): [id number]   ORDERING CLINICIAN: WILI LIVE   FINDINGS: AP radiograph of the chest was provided.   Postoperative findings status post TAVR. Inferior approach cardiac pacing wire is retracted compared to prior with distal radiopaque markers now projecting over the midline.   CARDIOMEDIASTINAL SILHOUETTE: Cardiomediastinal silhouette is stable in size and configuration.   LUNGS: Diffuse interstitial prominence, grossly stable as compared to prior. Right basilar subsegmental atelectasis, mildly decreased as compared to prior. The right costophrenic angle is clear. The left costophrenic angle is obscured by an enlarged cardiomediastinal silhouette. There is no new focal consolidation. There is no pneumothorax.   ABDOMEN: No remarkable upper abdominal findings.   BONES: No acute osseous changes.       1. Interval retraction of an inferior approach cardiac pacing wire. 2. Stable mild pulmonary interstitial edema. 3. Right subsegmental basilar atelectasis is mildly decreased.   I personally reviewed the images/study and I agree with the findings as stated by Bekah Salinas MD (PGY-2). This study was interpreted at [facility],  [place], [place].   MACRO: None   Signed by: Tereso Hampton 11/6/2024 4:05 PM Dictation workstation:   [id number]       Assessment & Plan  Rhinovirus    S/P TAVR (transcatheter aortic valve replacement)    Complete heart block    Cardiac arrest    ESRD (end stage renal disease) (Multi)    Hematoma    Acute systolic HF (heart failure)    A-fib (Multi)    Aspiration pneumonia (Multi)    Coronary artery disease    Acute hypoxic respiratory failure (Multi)    Thrombocytopenia (CMS-HCC)    Hyperkalemia    Anemia of chronic disease    Cellulitis    Type 2 diabetes mellitus        Assessment/Plan:  Faviola Pleitez is a 69 y.o. female with PMHx of diabetic ESRD on T/Th/S HD with left arm fistula, CAD, s/p PCI to LAD in 2015, then s/p PCI to LM and mid and proximal LAD with double stenting 11/01/2024, lymphedema c/b recurrent cellulitis of LE, AoCD, CHRF of unclear etiology (on 3L NC baseline), DM2, HLD, HTN, recently diagnosed HFmrEF at 31% and severe AS, and afib, on Eliquis. She is now s/p successful TAVR with Evolut FX+ 29 mm 11/5. Transferred to CICU for post-procedural management. Had TVP pulled out with patient coding thereafter, getting ROSC after 5-7 min and then pacing transcutaneously. TVP was emergently placed back into the patient. Later in the day complicated by evolving hematoma in LE with loss of pulses found to be a pseudoaneurysm. 11/7 Micra ppm was placed and plan to start CVVH with trialysis line placed.    NEURO:  #Pain control  - PRN Tylenol  - continue fentanyl gtt for sedation and pain control     CARDS:  #Severe AS, s/p successful TAVR with Evolut FX+ 29 mm 11/5  #Heart block  TTE 10/29/24: EF 31%, GHK, reduced RV systolic function, LA mildly dilated, mod mitral annular calcification, mod elevated RVSP, Severe calcific AS with gradients of 64/38.4mmHg and DI of 0.19 and mild AI. ( Note the gradients and VTI were averaged over 5 consecutive beats given atrial fibrillation ) consistent with severe aortic  stenosis, severe AV cusp calc, mild AR. S/p successful TAVR with Evolut FX+ 29 mm 11/5. Left CFV TVP placed as well for transient heart block. Pre LVEDP: 20 mmHg. Pst LVEDP:  22 mmHg. Post gradient TTE: 5 mmHg.  - Repeat TTE 11/6 - LV EF low normal at 55%, mildly enlarged RV, low normal RVSF, Evolut transcatheter aortic valve bioprosthesis  - 11/7: Micra PPM placed with success, will monitor. Went from right groin. Placed trialysis line for CVVH   - Did not use Heparin given concern for HIT - used bivalirudin     #New HFmrEF, 31%  #Acute systolic and diastolic heart failure, decompensated  #CAD s/p PCI, 11/1/24  TTE report as above - global hypokinesis. BNP 1284 on admission. [facility] 11/1: Culprit vessel(s): left anterior descending. OCT guided successful PCI to LM and mid and proximal LAD with double stenting. Successful DCB for in stent restenosis of LAD stents. Lcx 50% ostial stenosis.  - New HF workup ordered 11/5              > Likely ICM  - Volume management with HD - nephrology managing  - GDMT limited due to ESRD on HD  - DAPT with ASA and stain  - Off AC given high risk of bleed with pseudoaneurysm and low platelets and evaluating for HIT  - Atorva 40 mg daily     #Newly diagnosed afib  10/25/24 at OSH.  - Holding anticoagulation given pseudoaneurysm, low platelets, risk of HIT  - Amiodarone 200mg daily    #CARDIAC ARREST - 11/6  TVP was removed around Noon for patient to be stepped down. After pulling the TVP, patient was okay but after 1 min she went into asystole. Compressed patient for 6-8 minutes and gave one dose of epinephrine. Was transcutaneously paced, and emergently taken to cath lab for replacement of TVP.  After placement of recurrent TVP patient lost capture after coming to the CICU and gave 2 compressions. She also became hypotensive and then dropped her pressures for which levophed and dopamine were started. She was intubated as she was vomiting fluids. Arterial line was placed and thereafter  "patient started to develop a hematoma in the groin for which pressure was applied. Monitoring for hematoma expansion.  - Continue levophed, dopamine, and vasopressin drips to titrate MAP>65  - Continue Fentanyl and versed drips  - Family updated and discussed care with them     #PULM  #CHRF, unclear etiology, possible COPD?  #Rhinovirus positive at OSH, resolved  #Pulmonary nodules, incidental finding  CT TAVR: Multiple solid non-calcified pulmonary nodules measuring up to 7 mm. In absence of prior comparative examinations, to ensure stability, consider follow up non contrast chest CT at 3-6 months. Completed prednisone taper per OSH recs during this [facility] admission.  - currently intubated - AC/VC Vt 430, RR 16, PEEP 10, FiO2 60  - Breo-Ellipta daily     NEPHRO:  #Hyperkalemia  #Diabetic ESRD on HD T, Th, Sat via left arm fistula  S/p HD 10/29, 10/31, 11/2, 11/4.  -discontinued Midodrine 5 mg TID  -on pressor support  -to start CVVH     HEME:  #Anemia of chronic disease  Hemoglobin 10.7 to 9.7 s/p TAVR  - CTM  - Consider IV iron     ID:  #Acute Cellulitis  #Poor nail integrity  #Hx lymphedema  Appears to have completed a course of Levaquin through 10/30 (initiated at OSH), and also \"recently completed metronidazole course,\" again, at OSH. No culture data. Ongoing cellulitis on exam 11/5, with bullae.  - Vancomycin started 11/6  - Wound care following - recommendations of daily cleaning at this time  - Consider podiatry consult    #Concern for Sepsis  - empiric abx coverage - vancomycin and meropenam  - blood culture drawn 11/7    ENDO:  #DM2  A1c 7 in August. Repeat 11/5 of 7.3  - Continue glucommander     DVT prophylaxis: SCDs     Code Status: Full Code (confirmed on admission)   NOK: Extended Emergency Contact Information  Primary Emergency Contact: Susie Sagastume - primary decision maker  Mobile Phone: [phone]  Relation: Daughter  Secondary Emergency Contact: SINDHU SAGASTUME - give updates to  Mobile Phone: " [phone]  Relation: Son      Patient was seen, staffed, and evaluated with Dr. Nika Rondon MD  [facility]/ Internal Medicine PGY-2

## 2024-11-07 NOTE — PROGRESS NOTES
Subjective Data:  Patient was intubated and sedated.    Overnight Events:    None     Objective Data:  Last Recorded Vitals:  Vitals:    11/07/24 0441 11/07/24 0500 11/07/24 0600 11/07/24 0700   BP:       BP Location:       Patient Position:       Pulse:  82 80 77   Resp: 20 19 20 19   Temp:       TempSrc:       SpO2:  99% 100% 99%   Weight:       Height:         Inpatient Medications:  Scheduled medications   Medication Dose Route Frequency    amiodarone  200 mg oral Daily    [Held by provider] apixaban  5 mg oral BID    aspirin  81 mg oral Daily    atorvastatin  40 mg oral q AM    calcium acetate  1,334 mg oral TID    clopidogrel  75 mg oral Daily    insulin regular  10 Units intravenous Once    Followed by    dextrose  25 g intravenous Once    fluticasone furoate-vilanteroL  1 puff inhalation Daily    Glucommander - insulin glargine  1-125 Units subcutaneous Daily    And    Glucommander - insulin lispro  0-125 Units subcutaneous With meals & nightly    lactulose  300 mL rectal Once    melatonin  6 mg oral Nightly    meropenem  500 mg intravenous q24h    oxygen   inhalation Continuous - Inhalation    oxygen   inhalation Continuous - Inhalation    pantoprazole  40 mg oral Daily before breakfast    perflutren lipid microspheres  0.5-10 mL of dilution intravenous Once in imaging    polyethylene glycol  17 g oral Daily    sennosides  2 tablet oral BID    sennosides-docusate sodium  1 tablet oral Nightly    sodium zirconium cyclosilicate  10 g oral q8h    sodium zirconium cyclosilicate  10 g oral q8h    vitamin B complex-vitamin C-folic acid  1 capsule oral Daily     PRN medications   Medication    acetaminophen    bisacodyl    dextrose    glucagon    glucagon    glucagon HCL    Glucommander - insulin lispro    guaiFENesin    HYDROmorphone    melatonin    midazolam    midazolam    ondansetron    polyethylene glycol    vancomycin     Continuous Medications   Medication Dose Last Rate    dextrose 10 % in water (D10W)  50  mL/hr      dextrose 10 % in water (D10W)  50 mL/hr      DOPamine  0-10 mcg/kg/min Stopped (11/06/24 1330)    fentaNYL   mcg/hr 100 mcg/hr (11/07/24 0238)    midazolam  0-20 mg/hr 2 mg/hr (11/06/24 1626)    norepinephrine  0-0.2 mcg/kg/min 0.1 mcg/kg/min (11/07/24 0634)    vasopressin  0.03 Units/min 0.03 Units/min (11/06/24 3555)       Physical Exam:  General: NAD, sitting in chair  Skin: warm and dry   Head/ neck: no JVD seen at 90 degrees  Cardiac: RRR, S1, S2   Pulm: CTAB, O2 via nc   GI: soft, nontender   Extremities: no LE edema, chronic bilateral lymphedema; right fem groin site with minimal ecchymosis, nontender, BLE warm; LUE AVF  Neuro: no focal neuro deficits   Psych: appropriate mood and behavior        Assessment/Plan   Faviola Pleitez is a 69 y.o. female with PMH of ESRD/HD, multivessel obstructive CAD with previous stent (LM-LAD prox 80-90% s/p LAD under-expanded stents, severe mid LAD 90%, severe prox 95% LCx), ICM/HFrEF (LVEF 30% in 10/2024), lymphedema, anemia of chronic disease, obesity (BMI 46.5), chronic respiratory failure (on 3LNC at home), DM2, HLP, and HTN, who underwent successful TAVR with preDil with TrueDil 21 mm balloon and 22 mm balloon and successful TAVR with Evolut FX+ 29 mm. The procedure was complicated with transient CHB and EP was consulted for the further evaluation. Within 24 hours after the TVP placement, patient did not require TVP pacing and the repeated 12-lead ECG showed narrow QRS identical with the pre-TAVR one. Therefore, the TVP was removed and patient developed acute CHB requiring emergent LIJ TVP placement c/b acute respiratory failure s/p emergent ET intubation.       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  - Agree with submitting HIT panel for thrombocytopenia (125 on 11/1 -> 49 on 11/7)  - Repeat CBC this afternoon and consider platelet transfusion, if the repeated platelet <50 again  - Please confirm with nephrology about either nephrology is considering new AVF placement at R-side axillary/subclavian vein in the near future.  - Tentatively, EP will plan high-risk R femoral vein approach Medtronic Micra leadless PPM implantation this afternoon.  - Hold Amiodarone 200 mg once daily  - Avoid any kinds of AVN blocking agents     EP will follow this case later.     Betsy Garcias MD  Clinical Cardiac Electrophysiology Fellow    Peripheral IV 20 G Right Antecubital (Active)   Site Assessment Clean;Dry;Intact 11/07/24 0400   Dressing Type Transparent 11/07/24 0400   Line Status No blood return;Flushed;Saline locked 11/07/24 0400   Dressing Status Clean;Occlusive;Dry 11/07/24 0400   Number of days: 10       Peripheral IV 10/29/24 22 G Right;Anterior Forearm (Active)   Site Assessment Clean;Dry;Intact 11/07/24 0400   Dressing Type Transparent 11/07/24 0400   Line Status Infusing 11/07/24 0400   Dressing Status Clean;Dry;Occlusive 11/07/24 0400   Number of days: 9       ETT  8 mm (Active)   Secured at (cm) 23 cm 11/07/24 0743   Measured from Lips 11/07/24 0743   Secured Location Center 11/07/24 0743   Secured by Commercial tube reyes 11/07/24 0743   Number of days: 1       NG/OG/Feeding Tube OG - [facility] mouth (Active)   Tube Status Medication administration only 11/06/24 2000   Placement Verification X-ray 11/06/24 2000   Distal Tube Measurement 74 cm 11/06/24 2000   Site Assessment Clean;Dry;Intact 11/06/24 2000   Irrigant Tap water 11/06/24 2000   Response To Intervention No resistance met 11/06/24 2000   Number of days: 1        Hemodialysis Arteriovenous Fistula 11/03/24 Left Upper arm (Active)   AV Fistula Present;Thrill;Bruit 11/07/24 0400   Site Assessment Clean;Dry;Intact 11/07/24 0400   Status Deaccessed 11/07/24 0400   Number of days: 4       Code Status:  Full Code    I spent 60 minutes in the professional and overall care of this patient.        Betsy Garcias MD

## 2024-11-07 NOTE — SIGNIFICANT EVENT
Nephrology following for ESRD management. Patient had significant arrest event today with persistent lactic acidemia and hyperkalemia. Initial plans for HD tomorrow, but after discussing with CICU team, will plan for 2 hour emergent session this evening. Discussed temporizing medical measures and placed orders for 2 hours on 2K bath.  Thank you.    Discussed with Dr. Yi Barragan PGY-4 Nephrology fellow

## 2024-11-08 LAB
ALBUMIN SERPL BCP-MCNC: 2.6 G/DL (ref 3.4–5)
ALBUMIN SERPL BCP-MCNC: 2.6 G/DL (ref 3.4–5)
ALBUMIN SERPL BCP-MCNC: 2.7 G/DL (ref 3.4–5)
ALP SERPL-CCNC: 88 U/L (ref 33–136)
ALP SERPL-CCNC: 88 U/L (ref 33–136)
ALT SERPL W P-5'-P-CCNC: 498 U/L (ref 7–45)
ALT SERPL W P-5'-P-CCNC: 511 U/L (ref 7–45)
ANION GAP BLDA CALCULATED.4IONS-SCNC: 14 MMO/L (ref 10–25)
ANION GAP BLDA CALCULATED.4IONS-SCNC: 14 MMO/L (ref 10–25)
ANION GAP BLDA CALCULATED.4IONS-SCNC: 16 MMO/L (ref 10–25)
ANION GAP BLDA CALCULATED.4IONS-SCNC: 17 MMO/L (ref 10–25)
ANION GAP BLDA CALCULATED.4IONS-SCNC: 17 MMO/L (ref 10–25)
ANION GAP BLDA CALCULATED.4IONS-SCNC: 18 MMO/L (ref 10–25)
ANION GAP BLDA CALCULATED.4IONS-SCNC: ABNORMAL MMOL/L
ANION GAP SERPL CALC-SCNC: 17 MMOL/L (ref 10–20)
ANION GAP SERPL CALC-SCNC: 17 MMOL/L (ref 10–20)
ANION GAP SERPL CALC-SCNC: 18 MMOL/L (ref 10–20)
ANION GAP SERPL CALC-SCNC: 21 MMOL/L (ref 10–20)
AST SERPL W P-5'-P-CCNC: 400 U/L (ref 9–39)
AST SERPL W P-5'-P-CCNC: 400 U/L (ref 9–39)
BASE EXCESS BLDA CALC-SCNC: -2.9 MMOL/L (ref -2–3)
BASE EXCESS BLDA CALC-SCNC: -3 MMOL/L (ref -2–3)
BASE EXCESS BLDA CALC-SCNC: -3.8 MMOL/L (ref -2–3)
BASE EXCESS BLDA CALC-SCNC: -4.1 MMOL/L (ref -2–3)
BASE EXCESS BLDA CALC-SCNC: -7.9 MMOL/L (ref -2–3)
BASE EXCESS BLDA CALC-SCNC: -8.4 MMOL/L (ref -2–3)
BASE EXCESS BLDA CALC-SCNC: -8.8 MMOL/L (ref -2–3)
BASOPHILS # BLD AUTO: 0.02 X10*3/UL (ref 0–0.1)
BASOPHILS # BLD AUTO: 0.03 X10*3/UL (ref 0–0.1)
BASOPHILS # BLD AUTO: 0.03 X10*3/UL (ref 0–0.1)
BASOPHILS NFR BLD AUTO: 0.1 %
BASOPHILS NFR BLD AUTO: 0.2 %
BASOPHILS NFR BLD AUTO: 0.2 %
BILIRUB DIRECT SERPL-MCNC: 0.8 MG/DL (ref 0–0.3)
BILIRUB DIRECT SERPL-MCNC: 0.8 MG/DL (ref 0–0.3)
BILIRUB SERPL-MCNC: 2.1 MG/DL (ref 0–1.2)
BILIRUB SERPL-MCNC: 2.1 MG/DL (ref 0–1.2)
BODY TEMPERATURE: 37 DEGREES CELSIUS
BUN SERPL-MCNC: 21 MG/DL (ref 6–23)
BUN SERPL-MCNC: 25 MG/DL (ref 6–23)
BUN SERPL-MCNC: 28 MG/DL (ref 6–23)
BUN SERPL-MCNC: 34 MG/DL (ref 6–23)
CA-I BLDA-SCNC: 1.19 MMOL/L (ref 1.1–1.33)
CA-I BLDA-SCNC: 1.2 MMOL/L (ref 1.1–1.33)
CA-I BLDA-SCNC: 1.2 MMOL/L (ref 1.1–1.33)
CA-I BLDA-SCNC: 1.23 MMOL/L (ref 1.1–1.33)
CA-I BLDA-SCNC: 1.26 MMOL/L (ref 1.1–1.33)
CA-I BLDA-SCNC: 1.27 MMOL/L (ref 1.1–1.33)
CA-I BLDA-SCNC: 1.29 MMOL/L (ref 1.1–1.33)
CALCIUM SERPL-MCNC: 8.6 MG/DL (ref 8.6–10.6)
CALCIUM SERPL-MCNC: 8.9 MG/DL (ref 8.6–10.6)
CALCIUM SERPL-MCNC: 9 MG/DL (ref 8.6–10.6)
CALCIUM SERPL-MCNC: 9.1 MG/DL (ref 8.6–10.6)
CHLORIDE BLDA-SCNC: 100 MMOL/L (ref 98–107)
CHLORIDE BLDA-SCNC: 101 MMOL/L (ref 98–107)
CHLORIDE BLDA-SCNC: 101 MMOL/L (ref 98–107)
CHLORIDE BLDA-SCNC: 99 MMOL/L (ref 98–107)
CHLORIDE BLDA-SCNC: ABNORMAL MMOL/L
CHLORIDE SERPL-SCNC: 100 MMOL/L (ref 98–107)
CHLORIDE SERPL-SCNC: 98 MMOL/L (ref 98–107)
CO2 SERPL-SCNC: 19 MMOL/L (ref 21–32)
CO2 SERPL-SCNC: 22 MMOL/L (ref 21–32)
CO2 SERPL-SCNC: 23 MMOL/L (ref 21–32)
CO2 SERPL-SCNC: 24 MMOL/L (ref 21–32)
CREAT SERPL-MCNC: 2.53 MG/DL (ref 0.5–1.05)
CREAT SERPL-MCNC: 2.75 MG/DL (ref 0.5–1.05)
CREAT SERPL-MCNC: 3.15 MG/DL (ref 0.5–1.05)
CREAT SERPL-MCNC: 3.92 MG/DL (ref 0.5–1.05)
EGFRCR SERPLBLD CKD-EPI 2021: 12 ML/MIN/1.73M*2
EGFRCR SERPLBLD CKD-EPI 2021: 16 ML/MIN/1.73M*2
EGFRCR SERPLBLD CKD-EPI 2021: 18 ML/MIN/1.73M*2
EGFRCR SERPLBLD CKD-EPI 2021: 20 ML/MIN/1.73M*2
EOSINOPHIL # BLD AUTO: 0.01 X10*3/UL (ref 0–0.7)
EOSINOPHIL # BLD AUTO: 0.1 X10*3/UL (ref 0–0.7)
EOSINOPHIL # BLD AUTO: 0.15 X10*3/UL (ref 0–0.7)
EOSINOPHIL NFR BLD AUTO: 0.1 %
EOSINOPHIL NFR BLD AUTO: 0.7 %
EOSINOPHIL NFR BLD AUTO: 1.2 %
ERYTHROCYTE [DISTWIDTH] IN BLOOD BY AUTOMATED COUNT: 17.5 % (ref 11.5–14.5)
ERYTHROCYTE [DISTWIDTH] IN BLOOD BY AUTOMATED COUNT: 17.9 % (ref 11.5–14.5)
ERYTHROCYTE [DISTWIDTH] IN BLOOD BY AUTOMATED COUNT: 18.8 % (ref 11.5–14.5)
GLUCOSE BLD MANUAL STRIP-MCNC: 146 MG/DL (ref 74–99)
GLUCOSE BLD MANUAL STRIP-MCNC: 156 MG/DL (ref 74–99)
GLUCOSE BLD MANUAL STRIP-MCNC: 173 MG/DL (ref 74–99)
GLUCOSE BLD MANUAL STRIP-MCNC: 187 MG/DL (ref 74–99)
GLUCOSE BLD MANUAL STRIP-MCNC: 190 MG/DL (ref 74–99)
GLUCOSE BLD MANUAL STRIP-MCNC: 191 MG/DL (ref 74–99)
GLUCOSE BLD MANUAL STRIP-MCNC: 203 MG/DL (ref 74–99)
GLUCOSE BLD MANUAL STRIP-MCNC: 213 MG/DL (ref 74–99)
GLUCOSE BLD MANUAL STRIP-MCNC: 38 MG/DL (ref 74–99)
GLUCOSE BLD MANUAL STRIP-MCNC: 95 MG/DL (ref 74–99)
GLUCOSE BLDA-MCNC: 146 MG/DL (ref 74–99)
GLUCOSE BLDA-MCNC: 157 MG/DL (ref 74–99)
GLUCOSE BLDA-MCNC: 162 MG/DL (ref 74–99)
GLUCOSE BLDA-MCNC: 184 MG/DL (ref 74–99)
GLUCOSE BLDA-MCNC: 194 MG/DL (ref 74–99)
GLUCOSE BLDA-MCNC: 199 MG/DL (ref 74–99)
GLUCOSE BLDA-MCNC: 214 MG/DL (ref 74–99)
GLUCOSE SERPL-MCNC: 155 MG/DL (ref 74–99)
GLUCOSE SERPL-MCNC: 177 MG/DL (ref 74–99)
GLUCOSE SERPL-MCNC: 204 MG/DL (ref 74–99)
GLUCOSE SERPL-MCNC: 205 MG/DL (ref 74–99)
HCO3 BLDA-SCNC: 16.6 MMOL/L (ref 22–26)
HCO3 BLDA-SCNC: 17.1 MMOL/L (ref 22–26)
HCO3 BLDA-SCNC: 17.6 MMOL/L (ref 22–26)
HCO3 BLDA-SCNC: 20.9 MMOL/L (ref 22–26)
HCO3 BLDA-SCNC: 21.5 MMOL/L (ref 22–26)
HCO3 BLDA-SCNC: 21.9 MMOL/L (ref 22–26)
HCO3 BLDA-SCNC: 22 MMOL/L (ref 22–26)
HCT VFR BLD AUTO: 25.3 % (ref 36–46)
HCT VFR BLD AUTO: 25.6 % (ref 36–46)
HCT VFR BLD AUTO: 26.1 % (ref 36–46)
HCT VFR BLD EST: 25 % (ref 36–46)
HCT VFR BLD EST: 26 % (ref 36–46)
HCT VFR BLD EST: 27 % (ref 36–46)
HCT VFR BLD EST: 30 % (ref 36–46)
HCT VFR BLD EST: ABNORMAL %
HGB BLD-MCNC: 8 G/DL (ref 12–16)
HGB BLD-MCNC: 8.5 G/DL (ref 12–16)
HGB BLD-MCNC: 8.6 G/DL (ref 12–16)
HGB BLDA-MCNC: 10.1 G/DL (ref 12–16)
HGB BLDA-MCNC: 8.2 G/DL (ref 12–16)
HGB BLDA-MCNC: 8.6 G/DL (ref 12–16)
HGB BLDA-MCNC: 8.7 G/DL (ref 12–16)
HGB BLDA-MCNC: 8.8 G/DL (ref 12–16)
HGB BLDA-MCNC: 8.9 G/DL (ref 12–16)
HGB BLDA-MCNC: ABNORMAL G/DL
IMM GRANULOCYTES # BLD AUTO: 0.08 X10*3/UL (ref 0–0.7)
IMM GRANULOCYTES # BLD AUTO: 0.12 X10*3/UL (ref 0–0.7)
IMM GRANULOCYTES # BLD AUTO: 0.29 X10*3/UL (ref 0–0.7)
IMM GRANULOCYTES NFR BLD AUTO: 0.6 % (ref 0–0.9)
IMM GRANULOCYTES NFR BLD AUTO: 1 % (ref 0–0.9)
IMM GRANULOCYTES NFR BLD AUTO: 1.6 % (ref 0–0.9)
INHALED O2 CONCENTRATION: 30 %
INTERPRETATION FOR ANTI-PLATELET FACTOR 4 ANTIBODY: NORMAL
LACTATE BLDA-SCNC: 2.2 MMOL/L (ref 0.4–2)
LACTATE BLDA-SCNC: 2.3 MMOL/L (ref 0.4–2)
LACTATE BLDA-SCNC: 2.8 MMOL/L (ref 0.4–2)
LACTATE BLDA-SCNC: 3 MMOL/L (ref 0.4–2)
LACTATE BLDA-SCNC: 6.8 MMOL/L (ref 0.4–2)
LACTATE BLDA-SCNC: 7.2 MMOL/L (ref 0.4–2)
LACTATE BLDA-SCNC: 7.4 MMOL/L (ref 0.4–2)
LYMPHOCYTES # BLD AUTO: 0.18 X10*3/UL (ref 1.2–4.8)
LYMPHOCYTES # BLD AUTO: 0.73 X10*3/UL (ref 1.2–4.8)
LYMPHOCYTES # BLD AUTO: 1.29 X10*3/UL (ref 1.2–4.8)
LYMPHOCYTES NFR BLD AUTO: 1 %
LYMPHOCYTES NFR BLD AUTO: 10.2 %
LYMPHOCYTES NFR BLD AUTO: 5.1 %
MAGNESIUM SERPL-MCNC: 1.83 MG/DL (ref 1.6–2.4)
MAGNESIUM SERPL-MCNC: 1.86 MG/DL (ref 1.6–2.4)
MCH RBC QN AUTO: 31 PG (ref 26–34)
MCH RBC QN AUTO: 32.2 PG (ref 26–34)
MCH RBC QN AUTO: 32.3 PG (ref 26–34)
MCHC RBC AUTO-ENTMCNC: 31.3 G/DL (ref 32–36)
MCHC RBC AUTO-ENTMCNC: 33 G/DL (ref 32–36)
MCHC RBC AUTO-ENTMCNC: 33.6 G/DL (ref 32–36)
MCV RBC AUTO: 96 FL (ref 80–100)
MCV RBC AUTO: 98 FL (ref 80–100)
MCV RBC AUTO: 99 FL (ref 80–100)
MONOCYTES # BLD AUTO: 0.62 X10*3/UL (ref 0.1–1)
MONOCYTES # BLD AUTO: 0.78 X10*3/UL (ref 0.1–1)
MONOCYTES # BLD AUTO: 0.93 X10*3/UL (ref 0.1–1)
MONOCYTES NFR BLD AUTO: 4.3 %
MONOCYTES NFR BLD AUTO: 4.3 %
MONOCYTES NFR BLD AUTO: 7.4 %
NEUTROPHILS # BLD AUTO: 10.07 X10*3/UL (ref 1.2–7.7)
NEUTROPHILS # BLD AUTO: 12.71 X10*3/UL (ref 1.2–7.7)
NEUTROPHILS # BLD AUTO: 16.99 X10*3/UL (ref 1.2–7.7)
NEUTROPHILS NFR BLD AUTO: 80 %
NEUTROPHILS NFR BLD AUTO: 89.1 %
NEUTROPHILS NFR BLD AUTO: 92.9 %
NRBC BLD-RTO: 0.4 /100 WBCS (ref 0–0)
NRBC BLD-RTO: 0.6 /100 WBCS (ref 0–0)
NRBC BLD-RTO: 1.9 /100 WBCS (ref 0–0)
OXYHGB MFR BLDA: 94.5 % (ref 94–98)
OXYHGB MFR BLDA: 95.1 % (ref 94–98)
OXYHGB MFR BLDA: 95.3 % (ref 94–98)
OXYHGB MFR BLDA: 95.5 % (ref 94–98)
OXYHGB MFR BLDA: 96 % (ref 94–98)
OXYHGB MFR BLDA: 96.3 % (ref 94–98)
OXYHGB MFR BLDA: ABNORMAL %
PCO2 BLDA: 33 MM HG (ref 38–42)
PCO2 BLDA: 34 MM HG (ref 38–42)
PCO2 BLDA: 35 MM HG (ref 38–42)
PCO2 BLDA: 37 MM HG (ref 38–42)
PCO2 BLDA: 37 MM HG (ref 38–42)
PCO2 BLDA: 38 MM HG (ref 38–42)
PCO2 BLDA: 39 MM HG (ref 38–42)
PH BLDA: 7.31 PH (ref 7.38–7.42)
PH BLDA: 7.35 PH (ref 7.38–7.42)
PH BLDA: 7.36 PH (ref 7.38–7.42)
PH BLDA: 7.37 PH (ref 7.38–7.42)
PH BLDA: 7.38 PH (ref 7.38–7.42)
PHOSPHATE SERPL-MCNC: 3.9 MG/DL (ref 2.5–4.9)
PHOSPHATE SERPL-MCNC: 3.9 MG/DL (ref 2.5–4.9)
PHOSPHATE SERPL-MCNC: 4 MG/DL (ref 2.5–4.9)
PHOSPHATE SERPL-MCNC: 4.1 MG/DL (ref 2.5–4.9)
PLATELET # BLD AUTO: 19 X10*3/UL (ref 150–450)
PLATELET # BLD AUTO: 27 X10*3/UL (ref 150–450)
PLATELET # BLD AUTO: 36 X10*3/UL (ref 150–450)
PO2 BLDA: 101 MM HG (ref 85–95)
PO2 BLDA: 102 MM HG (ref 85–95)
PO2 BLDA: 108 MM HG (ref 85–95)
PO2 BLDA: 112 MM HG (ref 85–95)
PO2 BLDA: 121 MM HG (ref 85–95)
PO2 BLDA: 137 MM HG (ref 85–95)
PO2 BLDA: 90 MM HG (ref 85–95)
POTASSIUM BLDA-SCNC: 4.4 MMOL/L (ref 3.5–5.3)
POTASSIUM BLDA-SCNC: 4.5 MMOL/L (ref 3.5–5.3)
POTASSIUM BLDA-SCNC: 4.5 MMOL/L (ref 3.5–5.3)
POTASSIUM BLDA-SCNC: 4.6 MMOL/L (ref 3.5–5.3)
POTASSIUM BLDA-SCNC: 4.8 MMOL/L (ref 3.5–5.3)
POTASSIUM BLDA-SCNC: 4.8 MMOL/L (ref 3.5–5.3)
POTASSIUM BLDA-SCNC: 4.9 MMOL/L (ref 3.5–5.3)
POTASSIUM SERPL-SCNC: 4.5 MMOL/L (ref 3.5–5.3)
POTASSIUM SERPL-SCNC: 4.6 MMOL/L (ref 3.5–5.3)
PROT SERPL-MCNC: 4.6 G/DL (ref 6.4–8.2)
PROT SERPL-MCNC: 4.6 G/DL (ref 6.4–8.2)
RBC # BLD AUTO: 2.58 X10*6/UL (ref 4–5.2)
RBC # BLD AUTO: 2.63 X10*6/UL (ref 4–5.2)
RBC # BLD AUTO: 2.67 X10*6/UL (ref 4–5.2)
SAO2 % BLDA: 98 % (ref 94–100)
SAO2 % BLDA: 99 % (ref 94–100)
SAO2 % BLDA: ABNORMAL %
SERUM AND PLATELET FACTOR 4: NORMAL
SODIUM BLDA-SCNC: 129 MMOL/L (ref 136–145)
SODIUM BLDA-SCNC: 130 MMOL/L (ref 136–145)
SODIUM BLDA-SCNC: 131 MMOL/L (ref 136–145)
SODIUM BLDA-SCNC: 131 MMOL/L (ref 136–145)
SODIUM BLDA-SCNC: 132 MMOL/L (ref 136–145)
SODIUM SERPL-SCNC: 134 MMOL/L (ref 136–145)
SODIUM SERPL-SCNC: 135 MMOL/L (ref 136–145)
VANCOMYCIN SERPL-MCNC: 22.6 UG/ML (ref 5–20)
WBC # BLD AUTO: 12.6 X10*3/UL (ref 4.4–11.3)
WBC # BLD AUTO: 14.3 X10*3/UL (ref 4.4–11.3)
WBC # BLD AUTO: 18.3 X10*3/UL (ref 4.4–11.3)

## 2024-11-08 PROCEDURE — 84132 ASSAY OF SERUM POTASSIUM: CPT

## 2024-11-08 PROCEDURE — 2500000004 HC RX 250 GENERAL PHARMACY W/ HCPCS (ALT 636 FOR OP/ED)

## 2024-11-08 PROCEDURE — 85025 COMPLETE CBC W/AUTO DIFF WBC: CPT

## 2024-11-08 PROCEDURE — 83735 ASSAY OF MAGNESIUM: CPT

## 2024-11-08 PROCEDURE — 82947 ASSAY GLUCOSE BLOOD QUANT: CPT

## 2024-11-08 PROCEDURE — 94762 N-INVAS EAR/PLS OXIMTRY CONT: CPT

## 2024-11-08 PROCEDURE — 90945 DIALYSIS ONE EVALUATION: CPT | Performed by: INTERNAL MEDICINE

## 2024-11-08 PROCEDURE — 37799 UNLISTED PX VASCULAR SURGERY: CPT

## 2024-11-08 PROCEDURE — 2500000005 HC RX 250 GENERAL PHARMACY W/O HCPCS

## 2024-11-08 PROCEDURE — 2500000001 HC RX 250 WO HCPCS SELF ADMINISTERED DRUGS (ALT 637 FOR MEDICARE OP)

## 2024-11-08 PROCEDURE — 85025 COMPLETE CBC W/AUTO DIFF WBC: CPT | Performed by: STUDENT IN AN ORGANIZED HEALTH CARE EDUCATION/TRAINING PROGRAM

## 2024-11-08 PROCEDURE — 90937 HEMODIALYSIS REPEATED EVAL: CPT

## 2024-11-08 PROCEDURE — 94003 VENT MGMT INPAT SUBQ DAY: CPT

## 2024-11-08 PROCEDURE — 84145 PROCALCITONIN (PCT): CPT

## 2024-11-08 PROCEDURE — 93010 ELECTROCARDIOGRAM REPORT: CPT | Performed by: INTERNAL MEDICINE

## 2024-11-08 PROCEDURE — 84132 ASSAY OF SERUM POTASSIUM: CPT | Performed by: STUDENT IN AN ORGANIZED HEALTH CARE EDUCATION/TRAINING PROGRAM

## 2024-11-08 PROCEDURE — 82040 ASSAY OF SERUM ALBUMIN: CPT

## 2024-11-08 PROCEDURE — 2500000004 HC RX 250 GENERAL PHARMACY W/ HCPCS (ALT 636 FOR OP/ED): Mod: JZ

## 2024-11-08 PROCEDURE — 71045 X-RAY EXAM CHEST 1 VIEW: CPT | Performed by: RADIOLOGY

## 2024-11-08 PROCEDURE — 3E043XZ INTRODUCTION OF VASOPRESSOR INTO CENTRAL VEIN, PERCUTANEOUS APPROACH: ICD-10-PCS | Performed by: INTERNAL MEDICINE

## 2024-11-08 PROCEDURE — 2500000002 HC RX 250 W HCPCS SELF ADMINISTERED DRUGS (ALT 637 FOR MEDICARE OP, ALT 636 FOR OP/ED)

## 2024-11-08 PROCEDURE — 2020000001 HC ICU ROOM DAILY

## 2024-11-08 PROCEDURE — 4B02XSZ MEASUREMENT OF CARDIAC PACEMAKER, EXTERNAL APPROACH: ICD-10-PCS | Performed by: STUDENT IN AN ORGANIZED HEALTH CARE EDUCATION/TRAINING PROGRAM

## 2024-11-08 PROCEDURE — 2500000004 HC RX 250 GENERAL PHARMACY W/ HCPCS (ALT 636 FOR OP/ED): Performed by: INTERNAL MEDICINE

## 2024-11-08 PROCEDURE — 80202 ASSAY OF VANCOMYCIN: CPT

## 2024-11-08 RX ORDER — PANTOPRAZOLE SODIUM 40 MG/1
40 TABLET, DELAYED RELEASE ORAL
Status: DISCONTINUED | OUTPATIENT
Start: 2024-11-08 | End: 2024-11-10 | Stop reason: HOSPADM

## 2024-11-08 RX ORDER — NOREPINEPHRINE BITARTRATE/D5W 8 MG/250ML
0-.5 PLASTIC BAG, INJECTION (ML) INTRAVENOUS CONTINUOUS
Status: DISCONTINUED | OUTPATIENT
Start: 2024-11-08 | End: 2024-11-08

## 2024-11-08 RX ORDER — MEROPENEM AND SODIUM CHLORIDE 1 G/50ML
1000 INJECTION, SOLUTION INTRAVENOUS EVERY 12 HOURS
Status: DISCONTINUED | OUTPATIENT
Start: 2024-11-08 | End: 2024-11-10 | Stop reason: HOSPADM

## 2024-11-08 RX ORDER — PANTOPRAZOLE SODIUM 40 MG/10ML
40 INJECTION, POWDER, LYOPHILIZED, FOR SOLUTION INTRAVENOUS
Status: DISCONTINUED | OUTPATIENT
Start: 2024-11-08 | End: 2024-11-10 | Stop reason: HOSPADM

## 2024-11-08 RX ORDER — ESOMEPRAZOLE MAGNESIUM 40 MG/1
40 GRANULE, DELAYED RELEASE ORAL
Status: DISCONTINUED | OUTPATIENT
Start: 2024-11-08 | End: 2024-11-10 | Stop reason: HOSPADM

## 2024-11-08 RX ORDER — NOREPINEPHRINE BITARTRATE/D5W 8 MG/250ML
0-.5 PLASTIC BAG, INJECTION (ML) INTRAVENOUS CONTINUOUS
Status: DISCONTINUED | OUTPATIENT
Start: 2024-11-08 | End: 2024-11-10 | Stop reason: HOSPADM

## 2024-11-08 RX ORDER — DOBUTAMINE HYDROCHLORIDE 400 MG/100ML
2.5-2 INJECTION INTRAVENOUS CONTINUOUS
Status: DISCONTINUED | OUTPATIENT
Start: 2024-11-08 | End: 2024-11-09

## 2024-11-08 RX ORDER — INSULIN LISPRO 100 [IU]/ML
0-10 INJECTION, SOLUTION INTRAVENOUS; SUBCUTANEOUS EVERY 4 HOURS
Status: DISCONTINUED | OUTPATIENT
Start: 2024-11-08 | End: 2024-11-10 | Stop reason: HOSPADM

## 2024-11-08 ASSESSMENT — PAIN - FUNCTIONAL ASSESSMENT

## 2024-11-08 ASSESSMENT — PAIN SCALES - GENERAL

## 2024-11-08 NOTE — PROGRESS NOTES
Subjective Data:  Patient was intubated and sedated.    Overnight Events:    None     Objective Data:  Last Recorded Vitals:  Vitals:    11/08/24 0807 11/08/24 0900 11/08/24 1000 11/08/24 1007   BP:       BP Location:       Patient Position:       Pulse:  70 70 72   Resp:  13 14 14   Temp: (!) 32.2 °C (90 °F) (!) 32.5 °C (90.5 °F)     TempSrc:       SpO2:  98%     Weight:       Height:         Inpatient Medications:  Scheduled medications   Medication Dose Route Frequency    [Held by provider] amiodarone  200 mg oral Daily    [Held by provider] apixaban  5 mg oral BID    aspirin  81 mg oral Daily    atorvastatin  40 mg oral q AM    clopidogrel  75 mg oral Daily    pantoprazole  40 mg oral Daily before breakfast    Or    esomeprazole  40 mg nasoduodenal tube Daily before breakfast    Or    pantoprazole  40 mg intravenous Daily before breakfast    fluticasone furoate-vilanteroL  1 puff inhalation Daily    insulin lispro  0-10 Units subcutaneous q4h    melatonin  6 mg oral Nightly    meropenem  1,000 mg intravenous q12h    oxygen   inhalation Continuous - Inhalation    perflutren lipid microspheres  0.5-10 mL of dilution intravenous Once in imaging    polyethylene glycol  17 g oral Daily    sennosides-docusate sodium  1 tablet oral Nightly    sodium chloride  1,000 mL CRRT Once    vitamin B complex-vitamin C-folic acid  1 capsule oral Daily     PRN medications   Medication    acetaminophen    Or    acetaminophen    Or    acetaminophen    bisacodyl    dextrose    glucagon    glucagon    glucagon HCL    melatonin    midazolam    midazolam    ondansetron    polyethylene glycol    sodium chloride    vancomycin     Continuous Medications   Medication Dose Last Rate    fentaNYL   mcg/hr 50 mcg/hr (11/08/24 0400)    midazolam  0-20 mg/hr 1 mg/hr (11/08/24 0400)    norepinephrine  0-0.2 mcg/kg/min 0.12 mcg/kg/min (11/08/24 1052)    PrismaSol BGK 2/3.5  25 mL/kg/hr 25 mL/kg/hr (11/08/24 0736)    vasopressin  0.03  Units/min 0.5 Units/min (11/08/24 0946)       Physical Exam:  General: NAD, sitting in chair  Skin: warm and dry   Head/ neck: no JVD seen at 90 degrees  Cardiac: RRR, S1, S2   Pulm: CTAB, O2 via nc   GI: soft, nontender   Extremities: NO significant bleeding, oozing, redness, or hematoma formation at R femoral Micra access site  Neuro: no focal neuro deficits   Psych: appropriate mood and behavior        Assessment/Plan   Faviola Pleitez is a 69 y.o. female with PMH of ESRD/HD, multivessel obstructive CAD with previous stent (LM-LAD prox 80-90% s/p LAD under-expanded stents, severe mid LAD 90%, severe prox 95% LCx), ICM/HFrEF (LVEF 30% in 10/2024), lymphedema, anemia of chronic disease, obesity (BMI 46.5), chronic respiratory failure (on 3LNC at home), DM2, HLP, and HTN, who underwent successful TAVR with preDil with TrueDil 21 mm balloon and 22 mm balloon and successful TAVR with Evolut FX+ 29 mm. The procedure was complicated with transient CHB and EP was consulted for the further evaluation. Within 24 hours after the TVP placement, patient did not require TVP pacing and the repeated 12-lead ECG showed narrow QRS identical with the pre-TAVR one. Therefore, the TVP was removed and patient developed acute CHB requiring emergent LIJ TVP placement c/b acute respiratory failure s/p emergent ET intubation. EP underwent successful Medtronic Micra leadless pacemaker implantation on 11/7/2024 without immediate complications.     EP Problems:  - Post-TAVR transient CHB with spontaneous improvement of AV conduction: NO requirement of TVP pacing for the last 24 hours after the TAVR procedure but patient developed acute sustained CHB  - s/p Successful Medtronic Micra leadless pacemaker implantation on 11/7/2024  - Newly diagnosed persistent AF in 10/2024 indicated for Amiodarone 200 mg daily  - Frequent monomorphic PVC predominantly originating from either LVOT-LCC area or AMC area     Recommendations:  - The reviewed CXR,  telemetry, and CIED interrogation were unremarkable this morning.  - Patient will be followed by  EP device clinic @[place] at 11AM on 12/6/2024.  - Once CICU team can resume therapeutic anticoagulation, after excluding BRENNA thrombus in the setting of non-anticoagulated AF >48 hours, we may be able to resume her recently started Amiodarone 200 mg daily.     EP will sign off this case today.     Betsy Garcias MD  Clinical Cardiac Electrophysiology Fellow    Peripheral IV 20 G Right Antecubital (Active)   Site Assessment Clean;Dry;Intact 11/08/24 0900   Dressing Status Clean;Dry;Occlusive 11/08/24 0900   Number of days: 11       Peripheral IV 10/29/24 22 G Right;Anterior Forearm (Active)   Site Assessment Clean;Dry;Intact 11/08/24 0900   Dressing Status Clean;Dry;Occlusive 11/08/24 0900   Number of days: 10       ETT  8 mm (Active)   Site Condition Dry;Other (Comment) 11/08/24 0900   Number of days: 2       Arterial Line 11/07/24 Right Radial (Active)   Site Assessment Clean;Dry;Intact 11/08/24 0900   Dressing Status Clean;Dry;Occlusive 11/08/24 0900   Number of days: 1       NG/OG/Feeding Tube OG - [facility] mouth (Active)   Site Assessment Clean;Dry;Intact 11/08/24 1001   Number of days: 2       Hemodialysis Cath 11/07/24 Left (Active)   Line Necessity Continuous renal replacement therapy 11/08/24 0400   Site Assessment Clean;Dry;Intact 11/08/24 0400   Dressing Type Antimicrobial patch;Transparent 11/08/24 0400   Dressing Status Clean;Dry;Occlusive 11/08/24 0400   Number of days: 1       Hemodialysis Arteriovenous Fistula 11/03/24 Left Upper arm (Active)   Site Assessment Clean;Dry;Intact 11/08/24 0900   Dressing Status Clean;Dry;Occlusive 11/08/24 0900   Number of days: 5       Code Status:  Full Code    I spent 60 minutes in the professional and overall care of this patient.        Betsy Garcias MD

## 2024-11-08 NOTE — PROGRESS NOTES
Spiritual Care Visit    Clinical Encounter Type  Visited With: Patient  Routine Visit: Follow-up  Continue Visiting: Yes  Referral From: Family  Referral To:     Advent Encounters  Advent Needs: Prayer         Sacramental Encounters  Sacrament of Sick-Anointing: Anointed    Patient received the Sacrament of the Anointing of the Sick by Fr. Hermilo Benavides,  [employer] .      Within the [employer] the Sacrament of the Sick, also known as the Anointing of the Sick, is a prayer in which we invoke the healing power of God.  Although considered part of 'Last Rites' the Anointing of the Sick, along with Eucharist and Reconciliation, is a repeatable sacrament and should be received by anyone about to undergo surgery, those in recovery and the elderly.  Those who are actively dying should receive the Anointing of the Sick for physical and spiritual healing.

## 2024-11-08 NOTE — CARE PLAN
The patient's goals for the shift include      The clinical goals for the shift include pt stays HDS throughot shift    Over the shift, the patient did not make progress toward the following goals. Barriers to progression include. Recommendations to address these barriers include.    Problem: Respiratory  Goal: Clear secretions with interventions this shift  11/8/2024 1157 by Myesha Cardona RN  Outcome: Not Progressing  11/8/2024 1156 by Myesha Cardona RN  Outcome: Not Progressing  11/8/2024 1155 by Myesha Cardona RN  Outcome: Not Progressing     Problem: Pain  Goal: Takes deep breaths with improved pain control throughout the shift  11/8/2024 1157 by Myesha Cardona RN  Outcome: Not Progressing  11/8/2024 1155 by Myesha Cardona RN  Outcome: Progressing  Goal: Turns in bed with improved pain control throughout the shift  11/8/2024 1157 by Myesha Cardona RN  Outcome: Not Progressing  11/8/2024 1155 by Myesha Cardona RN  Outcome: Progressing  Goal: Walks with improved pain control throughout the shift  11/8/2024 1157 by Myesha Cardona RN  Outcome: Not Progressing  11/8/2024 1155 by Myesha Cardona RN  Outcome: Progressing

## 2024-11-08 NOTE — PROCEDURES
CVVH note    Seen and examined on CVVH.   Labs and events reviewed     Vascular access:  LIJ nonTDC   BFR :200 ml/min  Filter: M150  Replacement solution: Prismasol 2K/3.5Ca  Replacement Rate: 2700 ml/hour  900/pump/900/filter/900    Monitor electrolytes every 12 h and correct per ICU protocol.  Fluid removal: per ICU team   No change in therapy for next 24 h.     Heart Rate:  [70-72]   Temp:  [31.7 °C (89.1 °F)-36.1 °C (97 °F)]   Resp:  [13-18]   Weight:  [127 kg (279 lb 15.8 oz)-130 kg (286 lb 9.6 oz)]   SpO2:  [93 %-100 %]        Intake/Output Summary (Last 24 hours) at 11/8/2024 1755  Last data filed at 11/8/2024 1700  Gross per 24 hour   Intake 1928.98 ml   Output 664 ml   Net 1264.98 ml       Scheduled medications  [Held by provider] amiodarone, 200 mg, oral, Daily  [Held by provider] apixaban, 5 mg, oral, BID  aspirin, 81 mg, oral, Daily  atorvastatin, 40 mg, oral, q AM  clopidogrel, 75 mg, oral, Daily  pantoprazole, 40 mg, oral, Daily before breakfast   Or  esomeprazole, 40 mg, nasoduodenal tube, Daily before breakfast   Or  pantoprazole, 40 mg, intravenous, Daily before breakfast  fluticasone furoate-vilanteroL, 1 puff, inhalation, Daily  hydrocortisone sodium succinate, 50 mg, intravenous, q6h  insulin lispro, 0-10 Units, subcutaneous, q4h  [Held by provider] melatonin, 6 mg, oral, Nightly  meropenem, 1,000 mg, intravenous, q12h  oxygen, , inhalation, Continuous - Inhalation  perflutren lipid microspheres, 0.5-10 mL of dilution, intravenous, Once in imaging  polyethylene glycol, 17 g, oral, Daily  sennosides-docusate sodium, 1 tablet, oral, Nightly  sodium chloride, 1,000 mL, CRRT, Once  vitamin B complex-vitamin C-folic acid, 1 capsule, oral, Daily      Continuous medications  fentaNYL,  mcg/hr, Last Rate: 50 mcg/hr (11/08/24 1706)  midazolam, 0-20 mg/hr, Last Rate: 1 mg/hr (11/08/24 0400)  norepinephrine, 0-0.5 mcg/kg/min, Last Rate: 0.24 mcg/kg/min (11/08/24 1619)  PrismaSol BGK 2/3.5, 25  mL/kg/hr, Last Rate: 25 mL/kg/hr (11/08/24 1347)  vasopressin, 0.03 Units/min, Last Rate: Stopped (11/08/24 1526)      PRN medications  PRN medications: acetaminophen **OR** acetaminophen **OR** acetaminophen, bisacodyl, dextrose, glucagon, glucagon, glucagon HCL, melatonin, midazolam, midazolam, ondansetron, polyethylene glycol, sodium chloride, vancomycin

## 2024-11-08 NOTE — PROGRESS NOTES
Pharmacy to Dose Vancomycin:  Faviola Pleitez is a 69 y.o. female ordered pharmacy to dose vancomycin for complicated skin and soft tissue infection. Today is day 4 of therapy.  Goal: Trough 15-20mg/L  Results from last 7 days   Lab Units 11/08/24  0410 11/07/24  2209 11/07/24  1630   BUN mg/dL 34* 43* 50*   CREATININE mg/dL 3.92* 5.26* 6.16*   WBC AUTO x10*3/uL 12.6* 15.8* 14.2*   VANCOMYCIN RM ug/mL 22.6*  --   --       Blood Culture   Date/Time Value Ref Range Status   11/07/2024 12:07 PM Loaded on Instrument - Culture in progress  Preliminary      No results found for the last 90 days.    Antibiotics: Merrem (Day 3).  Pertinent Medications: Norepi drip, contrast, CRRT, vasopressin drip.  Renal function CVVH dependent.  Level this morning was 22.6 drawn 30hrs after one time dose of vanco 1.5G.    Plan:  No vanco indicated today.  Follow-up level ordered for 11/9 AM unless clinically indicated sooner.  Pharmacy will continue daily vancomycin monitoring. Please contact the pharmacy with any questions.    Thank you,  Marco Regalado [facility]

## 2024-11-08 NOTE — PROGRESS NOTES
Physical Therapy                 Therapy Communication Note    Patient Name: Faviola Pleitez  MRN: [id number]  Department: [facility] CICU  Room: 18/18-A  Today's Date: 11/8/2024     Discipline: Physical Therapy    Missed Visit Reason: Missed Visit Reason:  (Pt on hold per CICU fellow at this time for bedrest 2/2 pseudoaneurysm and potential thrombin injection.)    Missed Time: Attempt    Comment:

## 2024-11-08 NOTE — PROGRESS NOTES
Faviola Pleitez is a 69 y.o. female on day 11 of admission presenting with Severe aortic stenosis.      Subjective   Overnight patient hypothermic, switched to warmed CVVH fluids, placed on bear hugger, and applied hot packs. Pt's pressors requirements have decreased over last 24 hours, now on levo 0.08 and vaso 0.03, VCAC 30%/430/14/10. Pt currently sedated on fent/midaz but per nursing when weaned will follow commands appropriately. CVVH running without issue. Unable to answer ROS questions this am but will wean sedation today for SAT.     Objective     Last Recorded Vitals  BP (!) 121/43   Pulse 70   Temp (!) 32.2 °C (90 °F)   Resp 13   Wt 127 kg (279 lb 15.8 oz)   SpO2 99%   Intake/Output last 3 Shifts:    Intake/Output Summary (Last 24 hours) at 11/8/2024 0817  Last data filed at 11/8/2024 0800  Gross per 24 hour   Intake 2390.69 ml   Output 420 ml   Net 1970.69 ml       Admission Weight  Weight: 119 kg (262 lb 12.6 oz) (10/28/24 0829)    Daily Weight  11/07/24 : 127 kg (279 lb 15.8 oz)    Image Results  XR chest 1 view  Narrative: Interpreted By:  Tereso Hampton,   STUDY:  XR CHEST 1 VIEW;  11/7/2024 4:17 pm      INDICATION:  Signs/Symptoms:post-pacemaker.      COMPARISON:  Chest radiograph dated 11/7/2024, 7:21 a.m.      ACCESSION NUMBER(S):  [id number]      ORDERING CLINICIAN:  PRIMO LUJAN      FINDINGS:  AP radiograph of the chest      The endotracheal tube is proximally 2.3 cm above the jean-paul. TAVR is  noted. The left internal jugular catheter is positioned within  superior vena cava. The enteric tube traverses the esophagus.      There is moderate cardiomegaly which is stable in comparison to the  prior study.      The left diaphragm and left ventricular apex are obscured. Pulmonary  aeration is similar previous study.      Impression: 1. Pulmonary aeration is similar to previous study.              Signed by: Tereso Hampton 11/8/2024 7:31 AM  Dictation workstation:   [id number]  XR chest 1  view  Narrative: Interpreted By:  Tereso Hampton and Omar Mahmoud   STUDY:  XR CHEST 1 VIEW;  11/7/2024 7:30 am      INDICATION:  Signs/Symptoms:tvp.          COMPARISON:  Chest x-ray 11/07/2024 7:21 a.m.      ACCESSION NUMBER(S):  [id number]      ORDERING CLINICIAN:  THUY PATTERSON      FINDINGS:  AP radiograph of the chest was provided.      Status post TAVR. Endotracheal tube tip projects 2.3 cm from the  jean-paul. Left IJ central venous catheter tip projects over the mid to  distal superior vena cava. Enteric tube tip projects beyond the field  of view. Left IJ approach cardiac pacer has been removed.      CARDIOMEDIASTINAL SILHOUETTE:  Cardiomediastinal silhouette is stable in size and configuration.      LUNGS:  The lungs are hypoexpanded with associated bronchovascular crowding.  Mild perihilar and bibasilar interstitial prominence, grossly stable  as compared to prior. Left costophrenic angle is minimally blunted or  obscured by the cardiomediastinal silhouette, stable as compared to  prior.      ABDOMEN:  No remarkable upper abdominal findings.      BONES:  No acute osseous changes.      Impression: 1. Hypoexpanded lungs with stable mild pulmonary interstitial edema.  2. Interval removal of left IJ approach cardiac pacer. Otherwise,  medical devices as described above.      I personally reviewed the images/study and I agree with the findings  as stated by Bekah Salinas MD (PGY-2). This study was interpreted at  [place], [place].      MACRO:  None      Signed by: Tereso Hampton 11/8/2024 7:20 AM  Dictation workstation:   [id number]    Constitutional: Intubated female, improving with response to command  HEENT: Normocephalic, atraumatic. PERRL. EOMI.  Respiratory: CTA bilaterally. No wheezes, rales, or rhonchi. Normal respiratory effort.  Cardiovascular: Micra PPM placed. No murmurs, gallops, or rubs. No JVD. Radial pulses 2+.  Abdominal: Soft, nondistended. Has  diffuse bruising on abdomen with TVP reinsertion in groin with left groin pseudoaneurysm.  Neuro: AOx3. No focal deficit. UE and LE strength 5/5 bilaterally and sensation intact.   MSK: lower extremity wounds and bandaged left extremity  Skin: Warm, dry. No rashes or wounds.   Psych: Appropriate mood and affect.      Relevant Results  Scheduled medications  [Held by provider] amiodarone, 200 mg, oral, Daily  [Held by provider] apixaban, 5 mg, oral, BID  aspirin, 81 mg, oral, Daily  atorvastatin, 40 mg, oral, q AM  calcium acetate, 1,334 mg, oral, TID  clopidogrel, 75 mg, oral, Daily  fluticasone furoate-vilanteroL, 1 puff, inhalation, Daily  insulin lispro, 0-10 Units, subcutaneous, q4h  melatonin, 6 mg, oral, Nightly  meropenem, 500 mg, intravenous, q24h  oxygen, , inhalation, Continuous - Inhalation  pantoprazole, 40 mg, oral, Daily before breakfast  perflutren lipid microspheres, 0.5-10 mL of dilution, intravenous, Once in imaging  polyethylene glycol, 17 g, oral, Daily  sennosides-docusate sodium, 1 tablet, oral, Nightly  sodium chloride, 1,000 mL, CRRT, Once  vitamin B complex-vitamin C-folic acid, 1 capsule, oral, Daily      Continuous medications  fentaNYL,  mcg/hr, Last Rate: 50 mcg/hr (11/08/24 0400)  midazolam, 0-20 mg/hr, Last Rate: 1 mg/hr (11/08/24 0400)  norepinephrine, 0-0.2 mcg/kg/min, Last Rate: 0.08 mcg/kg/min (11/08/24 0425)  PrismaSol BGK 2/3.5, 25 mL/kg/hr, Last Rate: 25 mL/kg/hr (11/08/24 0736)  vasopressin, 0.03 Units/min, Last Rate: 0.03 Units/min (11/08/24 0400)      PRN medications  PRN medications: acetaminophen **OR** acetaminophen **OR** acetaminophen, bisacodyl, dextrose, glucagon, glucagon, glucagon HCL, melatonin, midazolam, midazolam, ondansetron, polyethylene glycol, sodium chloride, vancomycin    Results for orders placed or performed during the hospital encounter of 10/28/24 (from the past 24 hours)   POCT GLUCOSE   Result Value Ref Range    POCT Glucose 173 (H) 74 - 99  mg/dL   POCT GLUCOSE   Result Value Ref Range    POCT Glucose 236 (H) 74 - 99 mg/dL   Blood Gas Arterial Full Panel   Result Value Ref Range    POCT pH, Arterial 7.33 (L) 7.38 - 7.42 pH    POCT pCO2, Arterial 38 38 - 42 mm Hg    POCT pO2, Arterial 173 (H) 85 - 95 mm Hg    POCT SO2, Arterial 99 94 - 100 %    POCT Oxy Hemoglobin, Arterial 96.8 94.0 - 98.0 %    POCT Hematocrit Calculated, Arterial 28.0 (L) 36.0 - 46.0 %    POCT Sodium, Arterial 128 (L) 136 - 145 mmol/L    POCT Potassium, Arterial 5.7 (H) 3.5 - 5.3 mmol/L    POCT Chloride, Arterial 99 98 - 107 mmol/L    POCT Ionized Calcium, Arterial 1.13 1.10 - 1.33 mmol/L    POCT Glucose, Arterial 222 (H) 74 - 99 mg/dL    POCT Lactate, Arterial 3.9 (H) 0.4 - 2.0 mmol/L    POCT Base Excess, Arterial -5.5 (L) -2.0 - 3.0 mmol/L    POCT HCO3 Calculated, Arterial 20.0 (L) 22.0 - 26.0 mmol/L    POCT Hemoglobin, Arterial 9.4 (L) 12.0 - 16.0 g/dL    POCT Anion Gap, Arterial 15 10 - 25 mmo/L    Patient Temperature 37.0 degrees Celsius    FiO2 60 %   Prepare Platelets: 1 Units   Result Value Ref Range    PRODUCT CODE X1604T05     Unit Number [id number]     Unit ABO A     Unit RH POS     Dispense Status TR     Blood Expiration Date 11/7/2024 11:59:00 PM EST     PRODUCT BLOOD TYPE 6200     UNIT VOLUME 243    Glucose   Result Value Ref Range    Glucose 221 (H) 74 - 99 mg/dL   CBC and Auto Differential   Result Value Ref Range    WBC 16.5 (H) 4.4 - 11.3 x10*3/uL    nRBC 0.5 (H) 0.0 - 0.0 /100 WBCs    RBC 2.91 (L) 4.00 - 5.20 x10*6/uL    Hemoglobin 9.3 (L) 12.0 - 16.0 g/dL    Hematocrit 28.7 (L) 36.0 - 46.0 %    MCV 99 80 - 100 fL    MCH 32.0 26.0 - 34.0 pg    MCHC 32.4 32.0 - 36.0 g/dL    RDW 18.3 (H) 11.5 - 14.5 %    Platelets 40 (LL) 150 - 450 x10*3/uL    Neutrophils % 79.5 40.0 - 80.0 %    Immature Granulocytes %, Automated 1.2 (H) 0.0 - 0.9 %    Lymphocytes % 8.6 13.0 - 44.0 %    Monocytes % 10.1 2.0 - 10.0 %    Eosinophils % 0.5 0.0 - 6.0 %    Basophils % 0.1 0.0 - 2.0 %     Neutrophils Absolute 13.07 (H) 1.20 - 7.70 x10*3/uL    Immature Granulocytes Absolute, Automated 0.20 0.00 - 0.70 x10*3/uL    Lymphocytes Absolute 1.42 1.20 - 4.80 x10*3/uL    Monocytes Absolute 1.67 (H) 0.10 - 1.00 x10*3/uL    Eosinophils Absolute 0.09 0.00 - 0.70 x10*3/uL    Basophils Absolute 0.02 0.00 - 0.10 x10*3/uL   Renal function panel   Result Value Ref Range    Glucose 220 (H) 74 - 99 mg/dL    Sodium 135 (L) 136 - 145 mmol/L    Potassium 6.0 (H) 3.5 - 5.3 mmol/L    Chloride 97 (L) 98 - 107 mmol/L    Bicarbonate 24 21 - 32 mmol/L    Anion Gap 20 10 - 20 mmol/L    Urea Nitrogen 51 (H) 6 - 23 mg/dL    Creatinine 6.15 (H) 0.50 - 1.05 mg/dL    eGFR 7 (L) >60 mL/min/1.73m*2    Calcium 8.6 8.6 - 10.6 mg/dL    Phosphorus 4.6 2.5 - 4.9 mg/dL    Albumin 3.1 (L) 3.4 - 5.0 g/dL   POCT GLUCOSE   Result Value Ref Range    POCT Glucose 222 (H) 74 - 99 mg/dL   Blood culture, peripheral #1    Specimen: Peripheral Venipuncture; Blood culture   Result Value Ref Range    Blood Culture Loaded on Instrument - Culture in progress    Blood Gas Arterial Full Panel Unsolicited   Result Value Ref Range    POCT pH, Arterial 7.46 (H) 7.38 - 7.42 pH    POCT pCO2, Arterial 24 (L) 38 - 42 mm Hg    POCT pO2, Arterial 341 (H) 85 - 95 mm Hg    POCT SO2, Arterial 100 94 - 100 %    POCT Oxy Hemoglobin, Arterial 97.4 94.0 - 98.0 %    POCT Hematocrit Calculated, Arterial 26.0 (L) 36.0 - 46.0 %    POCT Sodium, Arterial 131 (L) 136 - 145 mmol/L    POCT Potassium, Arterial 5.3 3.5 - 5.3 mmol/L    POCT Chloride, Arterial 103 98 - 107 mmol/L    POCT Ionized Calcium, Arterial 1.03 (L) 1.10 - 1.33 mmol/L    POCT Glucose, Arterial 216 (H) 74 - 99 mg/dL    POCT Lactate, Arterial 2.3 (H) 0.4 - 2.0 mmol/L    POCT Base Excess, Arterial -5.7 (L) -2.0 - 3.0 mmol/L    POCT HCO3 Calculated, Arterial 17.1 (L) 22.0 - 26.0 mmol/L    POCT Hemoglobin, Arterial 8.6 (L) 12.0 - 16.0 g/dL    POCT Anion Gap, Arterial 16 10 - 25 mmo/L    Patient Temperature 37.0  degrees Celsius   Blood Gas Arterial Full Panel   Result Value Ref Range    POCT pH, Arterial 7.40 7.38 - 7.42 pH    POCT pCO2, Arterial 32 (L) 38 - 42 mm Hg    POCT pO2, Arterial 164 (H) 85 - 95 mm Hg    POCT SO2, Arterial 99 94 - 100 %    POCT Oxy Hemoglobin, Arterial 96.6 94.0 - 98.0 %    POCT Hematocrit Calculated, Arterial 27.0 (L) 36.0 - 46.0 %    POCT Sodium, Arterial 129 (L) 136 - 145 mmol/L    POCT Potassium, Arterial 6.3 (HH) 3.5 - 5.3 mmol/L    POCT Chloride, Arterial 99 98 - 107 mmol/L    POCT Ionized Calcium, Arterial 1.13 1.10 - 1.33 mmol/L    POCT Glucose, Arterial 206 (H) 74 - 99 mg/dL    POCT Lactate, Arterial 2.6 (H) 0.4 - 2.0 mmol/L    POCT Base Excess, Arterial -4.4 (L) -2.0 - 3.0 mmol/L    POCT HCO3 Calculated, Arterial 19.8 (L) 22.0 - 26.0 mmol/L    POCT Hemoglobin, Arterial 8.9 (L) 12.0 - 16.0 g/dL    POCT Anion Gap, Arterial 17 10 - 25 mmo/L    Patient Temperature 37.0 degrees Celsius    FiO2 60 %   POCT GLUCOSE   Result Value Ref Range    POCT Glucose 246 (H) 74 - 99 mg/dL   Glucose   Result Value Ref Range    Glucose 232 (H) 74 - 99 mg/dL   CBC and Auto Differential   Result Value Ref Range    WBC 14.2 (H) 4.4 - 11.3 x10*3/uL    nRBC 0.5 (H) 0.0 - 0.0 /100 WBCs    RBC 2.97 (L) 4.00 - 5.20 x10*6/uL    Hemoglobin 9.6 (L) 12.0 - 16.0 g/dL    Hematocrit 29.2 (L) 36.0 - 46.0 %    MCV 98 80 - 100 fL    MCH 32.3 26.0 - 34.0 pg    MCHC 32.9 32.0 - 36.0 g/dL    RDW 18.0 (H) 11.5 - 14.5 %    Platelets 32 (LL) 150 - 450 x10*3/uL    Neutrophils % 80.4 40.0 - 80.0 %    Immature Granulocytes %, Automated 0.9 0.0 - 0.9 %    Lymphocytes % 7.8 13.0 - 44.0 %    Monocytes % 10.2 2.0 - 10.0 %    Eosinophils % 0.5 0.0 - 6.0 %    Basophils % 0.2 0.0 - 2.0 %    Neutrophils Absolute 11.38 (H) 1.20 - 7.70 x10*3/uL    Immature Granulocytes Absolute, Automated 0.13 0.00 - 0.70 x10*3/uL    Lymphocytes Absolute 1.10 (L) 1.20 - 4.80 x10*3/uL    Monocytes Absolute 1.44 (H) 0.10 - 1.00 x10*3/uL    Eosinophils Absolute  0.07 0.00 - 0.70 x10*3/uL    Basophils Absolute 0.03 0.00 - 0.10 x10*3/uL   Magnesium   Result Value Ref Range    Magnesium 1.94 1.60 - 2.40 mg/dL   Renal function panel   Result Value Ref Range    Glucose 232 (H) 74 - 99 mg/dL    Sodium 134 (L) 136 - 145 mmol/L    Potassium 6.1 (HH) 3.5 - 5.3 mmol/L    Chloride 97 (L) 98 - 107 mmol/L    Bicarbonate 22 21 - 32 mmol/L    Anion Gap 21 (H) 10 - 20 mmol/L    Urea Nitrogen 50 (H) 6 - 23 mg/dL    Creatinine 6.16 (H) 0.50 - 1.05 mg/dL    eGFR 7 (L) >60 mL/min/1.73m*2    Calcium 8.0 (L) 8.6 - 10.6 mg/dL    Phosphorus 4.4 2.5 - 4.9 mg/dL    Albumin 2.7 (L) 3.4 - 5.0 g/dL   POCT GLUCOSE   Result Value Ref Range    POCT Glucose 213 (H) 74 - 99 mg/dL   CBC and Auto Differential   Result Value Ref Range    WBC 15.8 (H) 4.4 - 11.3 x10*3/uL    nRBC 0.4 (H) 0.0 - 0.0 /100 WBCs    RBC 2.72 (L) 4.00 - 5.20 x10*6/uL    Hemoglobin 8.7 (L) 12.0 - 16.0 g/dL    Hematocrit 26.9 (L) 36.0 - 46.0 %    MCV 99 80 - 100 fL    MCH 32.0 26.0 - 34.0 pg    MCHC 32.3 32.0 - 36.0 g/dL    RDW 18.4 (H) 11.5 - 14.5 %    Platelets 42 (L) 150 - 450 x10*3/uL    Neutrophils % 83.1 40.0 - 80.0 %    Immature Granulocytes %, Automated 0.9 0.0 - 0.9 %    Lymphocytes % 7.1 13.0 - 44.0 %    Monocytes % 7.8 2.0 - 10.0 %    Eosinophils % 0.8 0.0 - 6.0 %    Basophils % 0.3 0.0 - 2.0 %    Neutrophils Absolute 13.15 (H) 1.20 - 7.70 x10*3/uL    Immature Granulocytes Absolute, Automated 0.15 0.00 - 0.70 x10*3/uL    Lymphocytes Absolute 1.12 (L) 1.20 - 4.80 x10*3/uL    Monocytes Absolute 1.23 (H) 0.10 - 1.00 x10*3/uL    Eosinophils Absolute 0.13 0.00 - 0.70 x10*3/uL    Basophils Absolute 0.05 0.00 - 0.10 x10*3/uL   Renal function panel   Result Value Ref Range    Glucose 229 (H) 74 - 99 mg/dL    Sodium 132 (L) 136 - 145 mmol/L    Potassium 5.3 3.5 - 5.3 mmol/L    Chloride 97 (L) 98 - 107 mmol/L    Bicarbonate 23 21 - 32 mmol/L    Anion Gap 17 10 - 20 mmol/L    Urea Nitrogen 43 (H) 6 - 23 mg/dL    Creatinine 5.26 (H) 0.50  - 1.05 mg/dL    eGFR 8 (L) >60 mL/min/1.73m*2    Calcium 8.5 (L) 8.6 - 10.6 mg/dL    Phosphorus 4.5 2.5 - 4.9 mg/dL    Albumin 2.7 (L) 3.4 - 5.0 g/dL   Blood Gas Arterial Full Panel   Result Value Ref Range    POCT pH, Arterial 7.37 (L) 7.38 - 7.42 pH    POCT pCO2, Arterial 36 (L) 38 - 42 mm Hg    POCT pO2, Arterial 180 (H) 85 - 95 mm Hg    POCT SO2, Arterial 99 94 - 100 %    POCT Oxy Hemoglobin, Arterial 96.7 94.0 - 98.0 %    POCT Hematocrit Calculated, Arterial 26.0 (L) 36.0 - 46.0 %    POCT Sodium, Arterial 131 (L) 136 - 145 mmol/L    POCT Potassium, Arterial 5.3 3.5 - 5.3 mmol/L    POCT Chloride, Arterial 100 98 - 107 mmol/L    POCT Ionized Calcium, Arterial 1.15 1.10 - 1.33 mmol/L    POCT Glucose, Arterial 225 (H) 74 - 99 mg/dL    POCT Lactate, Arterial 2.4 (H) 0.4 - 2.0 mmol/L    POCT Base Excess, Arterial -4.0 (L) -2.0 - 3.0 mmol/L    POCT HCO3 Calculated, Arterial 20.8 (L) 22.0 - 26.0 mmol/L    POCT Hemoglobin, Arterial 8.8 (L) 12.0 - 16.0 g/dL    POCT Anion Gap, Arterial 16 10 - 25 mmo/L    Patient Temperature 37.0 degrees Celsius    FiO2 50 %   Blood Gas Arterial Full Panel   Result Value Ref Range    POCT pH, Arterial 7.36 (L) 7.38 - 7.42 pH    POCT pCO2, Arterial 37 (L) 38 - 42 mm Hg    POCT pO2, Arterial 108 (H) 85 - 95 mm Hg    POCT SO2, Arterial 98 94 - 100 %    POCT Oxy Hemoglobin, Arterial 95.3 94.0 - 98.0 %    POCT Hematocrit Calculated, Arterial 26.0 (L) 36.0 - 46.0 %    POCT Sodium, Arterial 130 (L) 136 - 145 mmol/L    POCT Potassium, Arterial 4.6 3.5 - 5.3 mmol/L    POCT Chloride, Arterial 100 98 - 107 mmol/L    POCT Ionized Calcium, Arterial 1.20 1.10 - 1.33 mmol/L    POCT Glucose, Arterial 214 (H) 74 - 99 mg/dL    POCT Lactate, Arterial 2.3 (H) 0.4 - 2.0 mmol/L    POCT Base Excess, Arterial -4.1 (L) -2.0 - 3.0 mmol/L    POCT HCO3 Calculated, Arterial 20.9 (L) 22.0 - 26.0 mmol/L    POCT Hemoglobin, Arterial 8.8 (L) 12.0 - 16.0 g/dL    POCT Anion Gap, Arterial 14 10 - 25 mmo/L    Patient  Temperature 37.0 degrees Celsius    FiO2 30 %   POCT GLUCOSE   Result Value Ref Range    POCT Glucose 213 (H) 74 - 99 mg/dL   Magnesium   Result Value Ref Range    Magnesium 1.86 1.60 - 2.40 mg/dL   Blood Gas Arterial Full Panel   Result Value Ref Range    POCT pH, Arterial 7.38 7.38 - 7.42 pH    POCT pCO2, Arterial 37 (L) 38 - 42 mm Hg    POCT pO2, Arterial 121 (H) 85 - 95 mm Hg    POCT SO2, Arterial 99 94 - 100 %    POCT Oxy Hemoglobin, Arterial 96.3 94.0 - 98.0 %    POCT Hematocrit Calculated, Arterial 26.0 (L) 36.0 - 46.0 %    POCT Sodium, Arterial 130 (L) 136 - 145 mmol/L    POCT Potassium, Arterial 4.5 3.5 - 5.3 mmol/L    POCT Chloride, Arterial      POCT Ionized Calcium, Arterial 1.23 1.10 - 1.33 mmol/L    POCT Glucose, Arterial 199 (H) 74 - 99 mg/dL    POCT Lactate, Arterial 2.2 (H) 0.4 - 2.0 mmol/L    POCT Base Excess, Arterial -2.9 (L) -2.0 - 3.0 mmol/L    POCT HCO3 Calculated, Arterial 21.9 (L) 22.0 - 26.0 mmol/L    POCT Hemoglobin, Arterial 8.6 (L) 12.0 - 16.0 g/dL    POCT Anion Gap, Arterial      Patient Temperature 37.0 degrees Celsius    FiO2 30 %   CBC and Auto Differential   Result Value Ref Range    WBC 12.6 (H) 4.4 - 11.3 x10*3/uL    nRBC 0.4 (H) 0.0 - 0.0 /100 WBCs    RBC 2.67 (L) 4.00 - 5.20 x10*6/uL    Hemoglobin 8.6 (L) 12.0 - 16.0 g/dL    Hematocrit 26.1 (L) 36.0 - 46.0 %    MCV 98 80 - 100 fL    MCH 32.2 26.0 - 34.0 pg    MCHC 33.0 32.0 - 36.0 g/dL    RDW 17.9 (H) 11.5 - 14.5 %    Platelets 36 (LL) 150 - 450 x10*3/uL    Neutrophils % 80.0 40.0 - 80.0 %    Immature Granulocytes %, Automated 1.0 (H) 0.0 - 0.9 %    Lymphocytes % 10.2 13.0 - 44.0 %    Monocytes % 7.4 2.0 - 10.0 %    Eosinophils % 1.2 0.0 - 6.0 %    Basophils % 0.2 0.0 - 2.0 %    Neutrophils Absolute 10.07 (H) 1.20 - 7.70 x10*3/uL    Immature Granulocytes Absolute, Automated 0.12 0.00 - 0.70 x10*3/uL    Lymphocytes Absolute 1.29 1.20 - 4.80 x10*3/uL    Monocytes Absolute 0.93 0.10 - 1.00 x10*3/uL    Eosinophils Absolute 0.15 0.00  - 0.70 x10*3/uL    Basophils Absolute 0.03 0.00 - 0.10 x10*3/uL   Renal function panel   Result Value Ref Range    Glucose 205 (H) 74 - 99 mg/dL    Sodium 134 (L) 136 - 145 mmol/L    Potassium 4.5 3.5 - 5.3 mmol/L    Chloride 98 98 - 107 mmol/L    Bicarbonate 24 21 - 32 mmol/L    Anion Gap 17 10 - 20 mmol/L    Urea Nitrogen 34 (H) 6 - 23 mg/dL    Creatinine 3.92 (H) 0.50 - 1.05 mg/dL    eGFR 12 (L) >60 mL/min/1.73m*2    Calcium 8.9 8.6 - 10.6 mg/dL    Phosphorus 4.0 2.5 - 4.9 mg/dL    Albumin 2.6 (L) 3.4 - 5.0 g/dL   POCT GLUCOSE   Result Value Ref Range    POCT Glucose 173 (H) 74 - 99 mg/dL   POCT GLUCOSE   Result Value Ref Range    POCT Glucose 190 (H) 74 - 99 mg/dL       XR chest 1 view    Result Date: 11/8/2024  Interpreted By:  Tereso Hampton, STUDY: XR CHEST 1 VIEW;  11/7/2024 4:17 pm   INDICATION: Signs/Symptoms:post-pacemaker.   COMPARISON: Chest radiograph dated 11/7/2024, 7:21 a.m.   ACCESSION NUMBER(S): [id number]   ORDERING CLINICIAN: PRIMO LUJAN   FINDINGS: AP radiograph of the chest   The endotracheal tube is proximally 2.3 cm above the jean-paul. TAVR is noted. The left internal jugular catheter is positioned within superior vena cava. The enteric tube traverses the esophagus.   There is moderate cardiomegaly which is stable in comparison to the prior study.   The left diaphragm and left ventricular apex are obscured. Pulmonary aeration is similar previous study.       1. Pulmonary aeration is similar to previous study.       Signed by: Tereso Hampton 11/8/2024 7:31 AM Dictation workstation:   [id number]    XR chest 1 view    Result Date: 11/8/2024  Interpreted By:  Tereso Hampton,  and Brad Godfrey STUDY: XR CHEST 1 VIEW;  11/7/2024 7:30 am   INDICATION: Signs/Symptoms:tvp.     COMPARISON: Chest x-ray 11/07/2024 7:21 a.m.   ACCESSION NUMBER(S): [id number]   ORDERING CLINICIAN: THUY PATTERSON   FINDINGS: AP radiograph of the chest was provided.   Status post TAVR. Endotracheal tube tip projects 2.3 cm from  the jean-paul. Left IJ central venous catheter tip projects over the mid to distal superior vena cava. Enteric tube tip projects beyond the field of view. Left IJ approach cardiac pacer has been removed.   CARDIOMEDIASTINAL SILHOUETTE: Cardiomediastinal silhouette is stable in size and configuration.   LUNGS: The lungs are hypoexpanded with associated bronchovascular crowding. Mild perihilar and bibasilar interstitial prominence, grossly stable as compared to prior. Left costophrenic angle is minimally blunted or obscured by the cardiomediastinal silhouette, stable as compared to prior.   ABDOMEN: No remarkable upper abdominal findings.   BONES: No acute osseous changes.       1. Hypoexpanded lungs with stable mild pulmonary interstitial edema. 2. Interval removal of left IJ approach cardiac pacer. Otherwise, medical devices as described above.   I personally reviewed the images/study and I agree with the findings as stated by Bekah Salinas MD (PGY-2). This study was interpreted at [place], [place].   MACRO: None   Signed by: Tereso Hampton 11/8/2024 7:20 AM Dictation workstation:   [id number]    Electrophysiology procedure    Result Date: 11/7/2024  Leadless pacemaker implantation Procedures: Leadless PPM Implant (39144), Ultrasound Guided vascular access (37594), Placement of temporary dialysis catheter (35642), Insert temporary transvenous pacing electrode (26663) Patient history: Please refer to the detailed history and physical on the patient's medical chart. Procedure narrative: The patient was in the fasting state. A baseline ECG was recorded. The patient was set up for continuous monitoring of surface 12 lead ECG and pulse oximetry. Blood pressure was monitored with automatic cuff measurements. The procedure was performed under IV conscious sedation supplemented with intermittent moderate sedation. Bilateral groins were clipped, prepped, and draped in the usual sterile  fashion. The Right common femoral vein was evaluated with ultrasound, it was normal in size and anatomy.  The vein was accessed x2 using ultrasound guidance and a 18G Cook needle, with direct visualization of the needle at all times. Images were saved for the both vein access. A 8F and 6F sheath were advanced into the vein. Through the 6F sheath, a quadrapolar pacing catheter was inserted into the RV and appropriate capture and sensing parameters were met. Two Abbott Perclose devices were inserted and prepared with a Preclose technique. A long stiff Amplatz wire was advanced through the existing 8F sheath upto the SVC. The existing sheath was removed. Using a 20F akash dilator, the venotomy was dilated upto 20F. 0.1 mg/kg of IV bivalirudin given the clinical concern for possible HIT. The procedure was performed with a heparin-free environment. A 23F Medtronic introducer sheath was then advanced over the wire. The dilator was removed. The sheath was aspirated and connected to flush line with saline Under fluoroscopic guidance, the leadless pacemaker was advanced to the heart. With GANT and SAHIL fluoroscopy, the pacemaker was advanced to the midseptum. Contrast venography was also perfromed through the sheath to confirm mid-apical septal location. Appropriate electrical measurements were obtained. Cine documented appropriate deployment of the leadless pacemaker. A tug-test was done at this time, under Cine fluoroscopy 3/4 tines were noted to flex with the tug. The existing balloon-tipped temporary pacing catheter was removed from the LIJ sheath and the quadrapolar pacing catheter was removed from the right femoral sheath under fluoroscopy and the Micra position was not affected. At this time the anchor suture was cut, and removed under fluoroscopy. The delivery sheath was then removed. Electrical measurements were assessed and were appropriate. The sheath was then removed. Hemostasis was achieved with deployment of the  2 Perclose devices. Manual pressure was applied. The existing LIJ sheath was cleaned with betadine and chlorhexidine. A 75F Amplatz wire was introduced into the sheath and advanced to the RA. The sheath was removed, an incision was made adjacent to the wire, the tract was sequentially dilated with the two dilators in the 20cm Trialysis kit, and the Trialysis catheter was introduced over the wire to the SVC. The Amplatz wire was removed and the catheter was sutured in place and a CHG dressing was applied over the catheter. Summary: Successful leadless pacemaker implant Medtronic (MICRA AV) Successful exchange over a wire of existing LIJ sehath for 20cm dialysis catheter. RFV access closure: Vascade MVP Final Implant Settings: : Medtronic Device: Leadless pacemaker (Serial #[id number]) Lead Parameters RVent: 720 ohms, R wave 8.0mV, threshold 1.38V@0.24msec Pacing Mode: VVI Lower rate 70bpm  Post implant device parameters scanned into the system Recommendations: Tentatively patient can be discharged from  perspective after PA-lateral chest xray and device interrogation are done and provided the recovery parameters are appropriate. Bedrest for 3hours post-sheath removal A PA-lateral chest X-ray should be performed and telemetry monitoring continued for 24 hours. A 12 lead ECG should be performed prior to discharge from the hospital. The patient should continue with the present medications. Routine dialysis catheter care See complete procedural log and parameters.     XR chest 1 view    Result Date: 11/7/2024  Interpreted By:  Field, Tereso,  and Natalya Yaritza STUDY: XR CHEST 1 VIEW;  11/6/2024 7:07 pm   INDICATION: Signs/Symptoms:saturation.   COMPARISON: Same day chest radiograph time stamped 4:28 p.m.   ACCESSION NUMBER(S): [id number]   ORDERING CLINICIAN: THUY PATTERSON   FINDINGS: AP radiograph of the chest was provided.   Endotracheal tube in place with tip projecting 4.8 cm above the jean-paul.  Enteric tube courses over the mid-thorax and below the diaphragm with tip projecting over the gastric body. Left internal jugular approach transvenous pacing wire in similar positioning. Status post TAVR.   CARDIOMEDIASTINAL SILHOUETTE: Moderate to moderately large cardiomegaly. Cardiomediastinal silhouette is stable in size and configuration.   LUNGS: Hazy interstitial opacities throughout the bilateral lungs, similar to prior. Mild blunting of the left costophrenic angle. The right costophrenic angle is excluded from the field of view. Prominence of the right perihilar structures. No pneumothorax.   ABDOMEN: No remarkable upper abdominal findings.   BONES: No acute osseous abnormality.       1. Hazy interstitial edema and atelectasis, similar to prior, with a trace left pleural effusion. 2. Prominence of the right perihilar structures may also indicate interstitial edema and atelectasis or vascular congestion. 3. Medical devices as above.   I personally reviewed the image(s)/study and resident interpretation as stated by Dr. Yaritza Hoang MD. I agree with the findings as stated. This study was interpreted at [facility], [place], [place].   MACRO: None   Signed by: Tereso Hampton 11/7/2024 3:49 PM Dictation workstation:   [id number]    Vascular US Lower Extremity Pseudoaneurysm Evaluation Duplex Left    Result Date: 11/7/2024            [place] [address]   Tel [phone] and Fax [phone]  Vascular Lab Report [facility] US LOWER EXTREMITY PSEUDOANEURYSM DUPLEX LEFT  Patient Name:      JUDY SAGASTUME       Reading Physician:  [id number] Vaughn Castillo DO Study Date:        11/7/2024            Ordering Physician: [id number] THUY PATTERSON MRN/PID:           [id number]             Technologist:       Sandoval Santana                                                              RAJ LEON Accession#:        [id number]         Technologist 2: Date of Birth/Age: 1955 / 69 years Encounter#:         [id number] Gender:            F Admission Status:  Inpatient            Location Performed: [place] [facility]  Diagnosis/ICD: Atherosclerosis of other arteries-I70.8 CPT Codes:     10860 Peripheral artery Lower arterial Duplex limited  Patient History R/O Pseudo left.  **CRITICAL RESULT** Critical Result: Lt DEIA basiliosudo Notification called to Sr. Patterson on 11/7/2024 at 9:50:00 AM by HP at bedside.  CONCLUSIONS: Left Lower Arterial: The pseudoaneurism is noted to measures aprox. 1.6x1.3x2.5cm. 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 No evidence of DVT in the visualized vessels.  Imaging & Doppler Findings:  Right       Left  PSV        PSV        cm/s       CFA 88 cm/s       SFA 81 cm/s       PFA 39 cm/s  Left Pseudo Aneurysm                     Diam Left Pseudoaneurysm 2.5 cm  [id number] Vaughn Castillo DO Electronically signed by [id number] Vaughn Castillo DO on 11/7/2024 at 1:00:21 PM  ** Final **     ECG 12 lead    Result Date: 11/7/2024  Ventricular-paced rhythm Abnormal ECG When compared with ECG of 06-NOV-2024 06:16, Previous ECG has undetermined rhythm, needs review    XR chest 1 view    Result Date: 11/7/2024  Interpreted By:  Tereso Hampton and Omar Mahmoud STUDY: XR CHEST 1 VIEW;  11/6/2024 4:53 pm   INDICATION: Signs/Symptoms:intubation.     COMPARISON: Chest x-ray 11/06/2024 3:12 p.m.   ACCESSION NUMBER(S): [id number]   ORDERING CLINICIAN: THUY PATTERSON   FINDINGS: AP radiograph of the chest was provided.   Stable postoperative changes from TAVR. Left IJ cardiac pacing wire with tip projecting over the right ventricle, similar as compared to prior. Interval placement of an endotracheal tube with tip projecting 3.6 cm from jean-paul.   CARDIOMEDIASTINAL SILHOUETTE: The heart is  moderately severely enlarged stable in size and configuration.   LUNGS: Perihilar and bibasilar predominant opacities are mildly increased as compared to prior. Left costophrenic angle is obscured by the cardiomediastinal silhouette. Stable right subsegmental atelectasis. The right costophrenic angle is clear. There is no pneumothorax.   ABDOMEN: No remarkable upper abdominal findings.   BONES: No acute osseous changes.       1. Interval placement of an endotracheal tube in adequate position. Other medical devices as described above. 2. Mild increase in interstitial and alveolar pulmonary edema. 3. Stable right basilar subsegmental atelectasis.   I personally reviewed the images/study and I agree with the findings as stated by Bekah Salinas MD (PGY-2). This study was interpreted at [place], [place].   MACRO: None   Signed by: Tereso Hampton 11/7/2024 7:40 AM Dictation workstation:   [id number]    Vascular US lower extremity pseudoaneurysm evaluation duplex bilateral    Result Date: 11/7/2024  Interpreted By:  Shelton Pearce and Liller Gregory EXAMINATION: [facility] US LOWER EXTREMITY PSEUDOANEURYSM EVAL DUPLEX BILATERAL; 11/6/2024 8:17 pm   ORDERING PROVIDER: THUY PATTERSON   CLINICAL HISTORY: 70 y/o   F with  Signs/Symptoms:concern for pseudoaneurysm/hematoma after groin access   COMPARISON: None.   TECHNIQUE: Multiple sonographic images of the left groin were obtained.   FINDINGS: This examination is limited due to patient body habitus.     Limited ultrasound of the left groin within the area of concern reveals a heterogeneous fluid collection with lace-like internal echogenicity measuring 3.7 x 2.8 x 2.5 cm most consistent with hematoma from recent groin access.   The hematoma is in close proximity to a vascular structure, likely the femoral artery and vein.   No evidence of to and fro flow within the hematoma (Yin Villegas sign) to suggest pseudoaneurysm.       A large  hematoma is noted within the left groin measuring 3.7 x 2.8 x 2.5 cm which is in close proximity to vascular structures, likely left common femoral artery and vein without evidence to suggest pseudoaneurysm within limitations as described above. If there is persistent clinical concern for pseudoaneurysm or active bleeding, a CTA of the pelvis extending to the proximal thigh could be obtained for further evaluation.   I personally reviewed the images/study and I agree with the findings as stated above by resident physician, Dr. Benjamin Gómez.   Signed by: Shelton Pearce 11/7/2024 5:58 AM Dictation workstation:   [id number]    Cardiac Catheterization Procedure    Result Date: 11/6/2024   [facility], Cath Lab, [address] [place] [address] Cardiovascular Catheterization Report Patient Name:      JUDY SAGASTUME       Performing Physician:  [id number] Naman Maher DO Study Date:        11/6/2024            Verifying Physician:   [id number] Naman Maher DO MRN/PID:           [id number]             Cardiologist/Co-Scrub: Accession#:        [id number]         Ordering Provider:     MELY INVASIVE                                                                CARDIOLOGIST                                                                CUPID Date of Birth/Age: 1955 / 69 years Cardiologist: Gender:            F                    Fellow:                [id number] Gt Bello MD Admit Date:                             Fellow:                [id number] Richard Steele MD Encounter#:        [id number]           Surgeon:  Study: TVP  Indications: Suspected coronary artery disease. Medical History: Frailty status of patient entering lab: 4 = Vulnerable.   Procedure Description: After infiltration of local anesthetic, the left internal jugular vein was identified with two-dimensional ultrasound. Under direct ultrasound visualization, the left internal jugular vein was cannulated with a micropuncture technique. A 8 Italian sheath was placed in the vein. Post-procedure, the venous sheath was left intact and sutured in place.  Procedure Description Comments: After identifying the left internal jugular vein using ultrasound we proceeded with placing a microcatheter sheath confirmed under fluoroscopy the course of the wire in the RA we then upsized to 8 Italian sheath was placed. We then floated a temporarily RV wire to the apex of the RV. The pacing rate and output was tested and the output was lost at 3 mV. The pacer was set at 70. Patient tolerated procedure very well.  Hemo Personnel: +--------------------+---------+ Name                Duty      +--------------------+---------+ Naman Maher MD 1 +--------------------+---------+  Complications: No in-lab complications observed.  Cardiac Cath Post Procedure Notes: Post Procedure Diagnosis: S/p TVP emergent placement. Blood Loss:               Estimated blood loss during the procedure was 10cc                           mls. Specimens Removed:        Number of specimen(s) removed: none. ____________________________________________________________________________________ CONCLUSIONS:  1. Indication: Complete heart block post TAVR.  2. S/p left IJ TVP placement, rate set at 70bpm. ICD 10 Codes: Atrioventricular block, complete-I44.2  CPT Codes: Insertion of temporary single chamber cardiac electrode or pacemaker catheter (separate procedure)-06521  [id number] Naman Maher DO Performing Physician Electronically signed by Art Maher DO on 11/6/2024 at 9:21:06 PM  ** Final **     Transthoracic Echo (TTE) Limited    Result Date: 11/6/2024   [facility], [address] [place] [address],  [place] [address]                Tel [phone] and Fax [phone] TRANSTHORACIC ECHOCARDIOGRAM REPORT  Patient Name:       JUDY SAGASTUME      Reading Physician:    [id number] Dev Muñoz MD Study Date:         11/6/2024           Ordering Provider:    [id number] THUY JUDY PATTERSON MRN/PID:            [id number]            Fellow: Accession#:         [id number]        Nurse: Date of Birth/Age:  1955 / 69      Sonographer:          Tanya guido RDCS Gender assigned at  F                   Additional Staff: Birth: Height:             162.56 cm           Admit Date:           10/28/2024 Weight:             123.38 kg           Admission Status:     Inpatient - STAT BSA / BMI:          2.23 m2 / 46.69     Encounter#:           [id number]                     kg/m2 Blood Pressure:     119/76 mmHg         Department Location:  [facility] Study Type:    TRANSTHORACIC ECHO (TTE) LIMITED Diagnosis/ICD: Nonrheumatic aortic (valve) stenosis-I35.0 Indication:    Check for effusion CPT Code:      Echo Limited-25163; Doppler Limited-19464; Color Doppler-27520 Patient History: Pertinent History: ESRD on IHD, CAD, s/p PCI, lymphedema, chronic hypoxemia, DM,                    HLD, HTN, HFmrEF, afib, AS s/p TAVR #29mm Evolut FX                    (11/5/24). Study Detail: The following Echo studies were performed: 2D, M-Mode, Doppler and               color flow. Technically challenging study due to body habitus,               patient lying in supine position and prominent lung artifact. The               patient is intubated.  PHYSICIAN INTERPRETATION: Left Ventricle: Left ventricular ejection fraction is low normal, by visual estimate at 55%. The left ventricular cavity size is normal. Abnormal (paradoxical) septal motion, consistent with an  intraventricular conduction delay. Left ventricular diastolic filling was not assessed. Left Atrium: The left atrium is enlarged. Right Ventricle: The right ventricle is mildly enlarged. There is low normal right ventricular systolic function. Right Atrium: The right atrium is upper limits of normal in size. Aortic Valve: There is a prosthetic aortic valve present. There is Evolut transcatheter aortic valve bioprosthesis. There is trace aortic valve regurgitation. The peak instantaneous gradient of the aortic valve is 29 mmHg. Mitral Valve: The mitral valve is mildly thickened. There is mild to moderate mitral annular calcification. There is trace mitral valve regurgitation. Tricuspid Valve: The tricuspid valve was not well visualized. There is trace tricuspid regurgitation. Pulmonic Valve: The pulmonic valve is structurally normal. There is trace pulmonic valve regurgitation. Pericardium: Trivial pericardial effusion. Aorta: The aortic root was not well visualized. There is no dilatation of the aortic root. In comparison to the previous echocardiogram(s): Compared with study dated 11/6/2024, no significant change.  CONCLUSIONS:  1. Poorly visualized anatomical structures due to suboptimal image quality.  2. Left ventricular ejection fraction is low normal, by visual estimate at 55%.  3. Mildly enlarged right ventricle.  4. There is low normal right ventricular systolic function.  5. There is Evolut transcatheter aortic valve bioprosthesis. RECOMMENDATIONS: Utilizing an FDA cleared automated machine learning algorithm (EchoGo Heart Failure by [employer]), the analysis of the apical 4-chamber echocardiogram suggests the presence of heart failure with preserved ejection fraction (HFpEF)*. Clinical correlation looking for additional heart failure signs and symptoms is recommended, as a definite diagnosis of heart failure cannot be made by imaging alone. *Per ACC/AHA/HFSA universal diagnosis of heart failure, HFpEF is  defined as 1) signs and symptoms leading to clinical diagnosis of heart failure, 2) an ejection fraction of at least 50%, and 3) evidence of elevated intra-cardiac filling pressures by echocardiography, BNP elevation, or catheterization.  QUANTITATIVE DATA SUMMARY:  AORTA MEASUREMENTS:         Normal Ranges: Ao Sinus, d:        2.80 cm (2.1-3.5cm)  LV SYSTOLIC FUNCTION BY 2D PLANIMETRY (MOD):                      Normal Ranges: EF-Visual:      55 % LV EF Reported: 55 %  AORTIC VALVE:            Normal Ranges: AoV Vmax:      2.70 m/s  (<=1.7m/s) AoV Peak P.2 mmHg (<20mmHg) LVOT Max Elian:  1.75 m/s  (<=1.1m/s) LVOT VTI:      19.50 cm LVOT Diameter: 1.70 cm   (1.8-2.4cm) AoV Area,Vmax: 1.47 cm2  (2.5-4.5cm2)  [id number] Dev Muñoz MD Electronically signed on 2024 at 4:58:00 PM  ** Final **     XR chest 1 view    Result Date: 2024  Interpreted By:  Tereso Hampton and Omar Mahmoud STUDY: XR CHEST 1 VIEW;  2024 3:20 pm   INDICATION: Signs/Symptoms:placement of tvp.     COMPARISON: Chest x-ray 2022 11 p.m.   ACCESSION NUMBER(S): [id number]   ORDERING CLINICIAN: THUY PATTERSON   FINDINGS: AP radiograph of the chest was provided.   Interval retraction of the left IJ approach cardiac pacing were with tip now projecting within the expected location of the right ventricle. Status post TAVR.   CARDIOMEDIASTINAL SILHOUETTE: Cardiomediastinal silhouette is stable in size and configuration.   LUNGS: The lungs are hypoexpanded with associated bronchovascular crowding. There is stable diffuse interstitial prominence. Stable right subsegmental basilar atelectasis. There is no new focal consolidation. There is no pneumothorax. The costophrenic angles clear. The left costophrenic is again obscured by the cardiomediastinal silhouette.   ABDOMEN: No remarkable upper abdominal findings.   BONES: No acute osseous changes.       1. Interval retraction of left IJ approach cardiac pacing wire with tip now  projecting over the right ventricle. 2. Stable pulmonary interstitial edema. 3. Stable right basilar subsegmental atelectasis.   I personally reviewed the images/study and I agree with the findings as stated by Bekah Salinas MD (PGY-2). This study was interpreted at [facility], [place], [place].   MACRO: None   Signed by: Tereso Hampton 11/6/2024 4:08 PM Dictation workstation:   [id number]    XR chest 1 view    Result Date: 11/6/2024  Interpreted By:  Tereso Hampton and Omar Mahmoud STUDY: XR CHEST 1 VIEW;  11/6/2024 2:57 pm   INDICATION: Signs/Symptoms:tvp.     COMPARISON: Chest x-ray 11/06/2024 9:06 a.m.   ACCESSION NUMBER(S): [id number]   ORDERING CLINICIAN: THUY PATTERSON   FINDINGS: AP radiograph of the chest was provided.   Interval removal of an inferior approach cardiac pacing wire. Interval placement of a left internal jugular venous approach cardiac pacing wire with tip projecting over the expected location of the distal main pulmonary artery. Status post TAVR.   CARDIOMEDIASTINAL SILHOUETTE: Cardiomediastinal silhouette is stable in size and configuration.   LUNGS: The lungs are hypoexpanded with associated bronchovascular crowding. Diffuse interstitial prominence, stable as compared to prior. Stable right subsegmental atelectasis. There is no new focal consolidation. There is no pneumothorax. The left costophrenic angle is again partially obscured by the cardiomediastinal silhouette. The right costophrenic angle is clear.   ABDOMEN: No remarkable upper abdominal findings.   BONES: No acute osseous changes.       1. Interval placement of a left IJ approach cardiac pacer with tip projecting over the distal main pulmonary artery. 2. Stable mild pulmonary interstitial edema. 3. Stable right subsegmental atelectasis.   I personally reviewed the images/study and I agree with the findings as stated by Bekah Salinas MD (PGY-2). This study was interpreted at [facility]  [employer], [place].   MACRO: None   Signed by: Tereso Hampton 11/6/2024 4:07 PM Dictation workstation:   [id number]    XR chest 1 view    Result Date: 11/6/2024  Interpreted By:  Tereso Hampton and Omar Mahmoud STUDY: XR CHEST 1 VIEW;  11/6/2024 9:06 am   INDICATION: Signs/Symptoms:s/p TAVR.     COMPARISON: Chest x-ray 11/05/2024 1:10 p.m.   ACCESSION NUMBER(S): [id number]   ORDERING CLINICIAN: WILI LIVE   FINDINGS: AP radiograph of the chest was provided.   Postoperative findings status post TAVR. Inferior approach cardiac pacing wire is retracted compared to prior with distal radiopaque markers now projecting over the midline.   CARDIOMEDIASTINAL SILHOUETTE: Cardiomediastinal silhouette is stable in size and configuration.   LUNGS: Diffuse interstitial prominence, grossly stable as compared to prior. Right basilar subsegmental atelectasis, mildly decreased as compared to prior. The right costophrenic angle is clear. The left costophrenic angle is obscured by an enlarged cardiomediastinal silhouette. There is no new focal consolidation. There is no pneumothorax.   ABDOMEN: No remarkable upper abdominal findings.   BONES: No acute osseous changes.       1. Interval retraction of an inferior approach cardiac pacing wire. 2. Stable mild pulmonary interstitial edema. 3. Right subsegmental basilar atelectasis is mildly decreased.   I personally reviewed the images/study and I agree with the findings as stated by Bekah Salinas MD (PGY-2). This study was interpreted at [place], [place].   MACRO: None   Signed by: Tereso Hampton 11/6/2024 4:05 PM Dictation workstation:   [id number]       Assessment & Plan  Rhinovirus    S/P TAVR (transcatheter aortic valve replacement)    Complete heart block    Cardiac arrest    ESRD (end stage renal disease) (Multi)    Hematoma    Acute systolic HF (heart failure)    A-fib (Multi)    Aspiration pneumonia  (Multi)    Coronary artery disease    Acute hypoxic respiratory failure (Multi)    Thrombocytopenia (CMS-HCC)    Hyperkalemia    Anemia of chronic disease    Cellulitis    Type 2 diabetes mellitus        Assessment/Plan:  Faviola Pleitez is a 69 y.o. female with PMHx of diabetic ESRD on T/Th/S HD with left arm fistula, CAD, s/p PCI to LAD in 2015, then s/p PCI to LM and mid and proximal LAD with double stenting 11/01/2024, lymphedema c/b recurrent cellulitis of LE, AoCD, CHRF of unclear etiology (on 3L NC baseline), DM2, HLD, HTN, recently diagnosed HFmrEF at 31% and severe AS, and afib, on Eliquis. She is now s/p successful TAVR with Evolut FX+ 29 mm 11/5. Transferred to CICU for post-procedural management. Had TVP pulled out with patient coding thereafter, getting ROSC after 5-7 min and then pacing transcutaneously. TVP was emergently placed back into the patient. Course c/b L groin hematoma w/ pseudoaneurysm, and R groin bleeding from TAVR access site now s/p lido/epi on 11/7. Micra ppm was placed and plan to start CVVH with trialysis line placed.    NEURO:  #Pain control  - PRN Tylenol  - continue fentanyl gtt for sedation and pain control     CARDS:  #Severe AS, s/p successful TAVR with Evolut FX+ 29 mm 11/5  #Heart block  TTE 10/29/24: EF 31%, GHK, reduced RV systolic function, LA mildly dilated, mod mitral annular calcification, mod elevated RVSP, Severe calcific AS with gradients of 64/38.4mmHg and DI of 0.19 and mild AI. ( Note the gradients and VTI were averaged over 5 consecutive beats given atrial fibrillation ) consistent with severe aortic stenosis, severe AV cusp calc, mild AR. S/p successful TAVR with Evolut FX+ 29 mm 11/5. Left CFV TVP placed as well for transient heart block. Pre LVEDP: 20 mmHg. Pst LVEDP:  22 mmHg. Post gradient TTE: 5 mmHg.  - Repeat TTE 11/6 - LV EF low normal at 55%, mildly enlarged RV, low normal RVSF, Evolut transcatheter aortic valve bioprosthesis  - 11/7: Micra PPM placed  with success, will monitor. Went from right groin. Placed trialysis line for CVVH   - Did not use Heparin given concern for HIT - used bivalirudin     #New HFmrEF, 31%  #Acute systolic and diastolic heart failure, decompensated  #CAD s/p PCI, 11/1/24  TTE report as above - global hypokinesis. BNP 1284 on admission. [facility] 11/1: Culprit vessel(s): left anterior descending. OCT guided successful PCI to LM and mid and proximal LAD with double stenting. Successful DCB for in stent restenosis of LAD stents. Lcx 50% ostial stenosis.  - New HF workup ordered 11/5              > Likely ICM  - Volume management with HD - nephrology managing  - GDMT limited due to ESRD on HD  - DAPT with ASA and stain  - Off AC given high risk of bleed with pseudoaneurysm and low platelets and evaluating for HIT  - Atorva 40 mg daily     #Newly diagnosed afib  10/25/24 at OSH.  - Holding anticoagulation given pseudoaneurysm, low platelets, risk of HIT  - Amiodarone 200mg daily    #CARDIAC ARREST - 11/6  TVP was removed around Noon for patient to be stepped down. After pulling the TVP, patient was okay but after 1 min she went into asystole. Compressed patient for 6-8 minutes and gave one dose of epinephrine. Was transcutaneously paced, and emergently taken to cath lab for replacement of TVP.  After placement of recurrent TVP patient lost capture after coming to the CICU and gave 2 compressions. She also became hypotensive and then dropped her pressures for which levophed and dopamine were started. She was intubated as she was vomiting fluids. Arterial line was placed and thereafter patient started to develop a hematoma in the groin for which pressure was applied. Monitoring for hematoma expansion.  - Continue levophed, dopamine, and vasopressin drips to titrate MAP>65  - Continue Fentanyl and versed drips  - Family updated and discussed care with them     #PULM  #CHRF, unclear etiology, possible COPD?  #Rhinovirus positive at OSH,  "resolved  #Pulmonary nodules, incidental finding  CT TAVR: Multiple solid non-calcified pulmonary nodules measuring up to 7 mm. In absence of prior comparative examinations, to ensure stability, consider follow up non contrast chest CT at 3-6 months. Completed prednisone taper per OSH recs during this [facility] [place].  - currently intubated - AC/VC Vt 430, RR 16, PEEP 10, FiO2 60  - Breo-Ellipta daily     NEPHRO:  #Hyperkalemia  #Diabetic ESRD on HD T, Th, Sat via left arm fistula  S/p HD 10/29, 10/31, 11/2, 11/4.  -discontinued Midodrine 5 mg TID  -on pressor support  -to start CVVH     HEME:  #Anemia of chronic disease  Hemoglobin 10.7 to 9.7 s/p TAVR  - CTM  - Consider IV iron     ID:  #Acute Cellulitis  #Poor nail integrity  #Hx lymphedema  Appears to have completed a course of Levaquin through 10/30 (initiated at OSH), and also \"recently completed metronidazole course,\" again, at OSH. No culture data. Ongoing cellulitis on exam 11/5, with bullae.  - Vancomycin started 11/6  - Wound care following - recommendations of daily cleaning at this time  - Consider podiatry consult    #Concern for Sepsis  - empiric abx coverage - vancomycin and meropenam  - blood culture drawn 11/7    ENDO:  #DM2  A1c 7 in August. Repeat 11/5 of 7.3  - Continue glucommander     DVT prophylaxis: SCDs     Code Status: Full Code (confirmed on admission)   NOK: Extended Emergency Contact Information  Primary Emergency Contact: Susie Sagastume - primary decision maker  Mobile Phone: [phone]  Relation: Daughter  Secondary Emergency Contact: SINDHU SAGASTUME - give updates to  Mobile Phone: [phone]  Relation: Son      Patient was seen, staffed, and evaluated with Dr. Nika Rondon MD  [facility]/ Internal Medicine PGY-2  "

## 2024-11-08 NOTE — PROGRESS NOTES
Faviola Pleitez is a 69 y.o. female on day 10 of admission presenting with Severe aortic stenosis.     Dialysis discontinued last night after ~ 15 minutes for ? reason. This am still hyperkalemic, awaiting to go to EP lab, with plans to placed dialysis catheter there , then proceed with CVVH. Meanwhile on Lokelma 10 grams Q 8 hours.     Objective:     Vitals:    11/07/24 1800   BP:    Pulse: 70   Resp: 18   Temp:    SpO2: 98%          Intake/Output Summary (Last 24 hours) at 11/7/2024 1901  Last data filed at 11/7/2024 1618  Gross per 24 hour   Intake 2220.61 ml   Output 86 ml   Net 2134.61 ml       General appearance: Intubated, sedated, on pressors  Skin: no apparent rash  Heart: regular  Lungs: CTA bilat anteriorly  Abdomen: soft, nt/nd  Extremities: 1+ edema bilat  Access: LIJ non-TDC and EUGENIE AVF      Meds:   [Held by provider] amiodarone, 200 mg, Daily  [Held by provider] apixaban, 5 mg, BID  [START ON 11/8/2024] aspirin, 81 mg, Daily  atorvastatin, 40 mg, q AM  calcium acetate, 1,334 mg, TID  clopidogrel, 75 mg, Daily  fluticasone furoate-vilanteroL, 1 puff, Daily  Glucommander - insulin glargine, 1-125 Units, Daily   And  Glucommander - insulin lispro, 0-125 Units, With meals & nightly  lactulose, 300 mL, Once  melatonin, 6 mg, Nightly  meropenem, 500 mg, q24h  oxygen, , Continuous - Inhalation  pantoprazole, 40 mg, Daily before breakfast  perflutren lipid microspheres, 0.5-10 mL of dilution, Once in imaging  polyethylene glycol, 17 g, Daily  sennosides-docusate sodium, 1 tablet, Nightly  sodium chloride, 1,000 mL, Once  sodium zirconium cyclosilicate, 10 g, q8h  vitamin B complex-vitamin C-folic acid, 1 capsule, Daily      fentaNYL, Last Rate: 100 mcg/hr (11/07/24 1314)  midazolam, Last Rate: 2 mg/hr (11/07/24 1025)  norepinephrine, Last Rate: 0.15 mcg/kg/min (11/07/24 1537)  PrismaSol BGK 2/3.5, Last Rate: 25 mL/kg/hr (11/07/24 7704)  vasopressin, Last Rate: 0.03 Units/min (11/07/24 1052)      acetaminophen,  650 mg, q4h PRN   Or  acetaminophen, 650 mg, q4h PRN   Or  acetaminophen, 650 mg, q4h PRN  acetaminophen, 650 mg, q4h PRN  bisacodyl, 5 mg, Daily PRN  dextrose, 10-50 mL, q15 min PRN  glucagon, 1 mg, q15 min PRN  glucagon, 1 mg, q15 min PRN  glucagon HCL, 1 mg, q15 min PRN  Glucommander - insulin lispro, 0-125 Units, PRN  guaiFENesin, 600 mg, BID PRN  HYDROmorphone, 0.2 mg, q3h PRN  melatonin, 3 mg, Nightly PRN  melatonin, 3 mg, Nightly PRN  midazolam, 1 mg, q1h PRN  midazolam, 1 mg, q1h PRN  ondansetron, 4 mg, q6h PRN  polyethylene glycol, 17 g, Daily PRN  sodium chloride, 1,000 mL, PRN  vancomycin, , Daily PRN        Labs:    Recent Results (from the past 12 hours)   POCT GLUCOSE    Collection Time: 11/07/24  8:20 AM   Result Value Ref Range    POCT Glucose 173 (H) 74 - 99 mg/dL   POCT GLUCOSE    Collection Time: 11/07/24  9:14 AM   Result Value Ref Range    POCT Glucose 236 (H) 74 - 99 mg/dL   Blood Gas Arterial Full Panel    Collection Time: 11/07/24  9:22 AM   Result Value Ref Range    POCT pH, Arterial 7.33 (L) 7.38 - 7.42 pH    POCT pCO2, Arterial 38 38 - 42 mm Hg    POCT pO2, Arterial 173 (H) 85 - 95 mm Hg    POCT SO2, Arterial 99 94 - 100 %    POCT Oxy Hemoglobin, Arterial 96.8 94.0 - 98.0 %    POCT Hematocrit Calculated, Arterial 28.0 (L) 36.0 - 46.0 %    POCT Sodium, Arterial 128 (L) 136 - 145 mmol/L    POCT Potassium, Arterial 5.7 (H) 3.5 - 5.3 mmol/L    POCT Chloride, Arterial 99 98 - 107 mmol/L    POCT Ionized Calcium, Arterial 1.13 1.10 - 1.33 mmol/L    POCT Glucose, Arterial 222 (H) 74 - 99 mg/dL    POCT Lactate, Arterial 3.9 (H) 0.4 - 2.0 mmol/L    POCT Base Excess, Arterial -5.5 (L) -2.0 - 3.0 mmol/L    POCT HCO3 Calculated, Arterial 20.0 (L) 22.0 - 26.0 mmol/L    POCT Hemoglobin, Arterial 9.4 (L) 12.0 - 16.0 g/dL    POCT Anion Gap, Arterial 15 10 - 25 mmo/L    Patient Temperature 37.0 degrees Celsius    FiO2 60 %   Prepare Platelets: 1 Units    Collection Time: 11/07/24 11:22 AM   Result Value Ref  Range    PRODUCT CODE A9215N46     Unit Number [id number]     Unit ABO A     Unit RH POS     Dispense Status TR     Blood Expiration Date 11/7/2024 11:59:00 PM EST     PRODUCT BLOOD TYPE 6200     UNIT VOLUME 243    Glucose    Collection Time: 11/07/24 11:58 AM   Result Value Ref Range    Glucose 221 (H) 74 - 99 mg/dL   CBC and Auto Differential    Collection Time: 11/07/24 11:58 AM   Result Value Ref Range    WBC 16.5 (H) 4.4 - 11.3 x10*3/uL    nRBC 0.5 (H) 0.0 - 0.0 /100 WBCs    RBC 2.91 (L) 4.00 - 5.20 x10*6/uL    Hemoglobin 9.3 (L) 12.0 - 16.0 g/dL    Hematocrit 28.7 (L) 36.0 - 46.0 %    MCV 99 80 - 100 fL    MCH 32.0 26.0 - 34.0 pg    MCHC 32.4 32.0 - 36.0 g/dL    RDW 18.3 (H) 11.5 - 14.5 %    Platelets 40 (LL) 150 - 450 x10*3/uL    Neutrophils % 79.5 40.0 - 80.0 %    Immature Granulocytes %, Automated 1.2 (H) 0.0 - 0.9 %    Lymphocytes % 8.6 13.0 - 44.0 %    Monocytes % 10.1 2.0 - 10.0 %    Eosinophils % 0.5 0.0 - 6.0 %    Basophils % 0.1 0.0 - 2.0 %    Neutrophils Absolute 13.07 (H) 1.20 - 7.70 x10*3/uL    Immature Granulocytes Absolute, Automated 0.20 0.00 - 0.70 x10*3/uL    Lymphocytes Absolute 1.42 1.20 - 4.80 x10*3/uL    Monocytes Absolute 1.67 (H) 0.10 - 1.00 x10*3/uL    Eosinophils Absolute 0.09 0.00 - 0.70 x10*3/uL    Basophils Absolute 0.02 0.00 - 0.10 x10*3/uL   Renal function panel    Collection Time: 11/07/24 11:58 AM   Result Value Ref Range    Glucose 220 (H) 74 - 99 mg/dL    Sodium 135 (L) 136 - 145 mmol/L    Potassium 6.0 (H) 3.5 - 5.3 mmol/L    Chloride 97 (L) 98 - 107 mmol/L    Bicarbonate 24 21 - 32 mmol/L    Anion Gap 20 10 - 20 mmol/L    Urea Nitrogen 51 (H) 6 - 23 mg/dL    Creatinine 6.15 (H) 0.50 - 1.05 mg/dL    eGFR 7 (L) >60 mL/min/1.73m*2    Calcium 8.6 8.6 - 10.6 mg/dL    Phosphorus 4.6 2.5 - 4.9 mg/dL    Albumin 3.1 (L) 3.4 - 5.0 g/dL   POCT GLUCOSE    Collection Time: 11/07/24 12:01 PM   Result Value Ref Range    POCT Glucose 222 (H) 74 - 99 mg/dL   Blood culture, peripheral #1     Collection Time: 11/07/24 12:07 PM    Specimen: Peripheral Venipuncture; Blood culture   Result Value Ref Range    Blood Culture Loaded on Instrument - Culture in progress    Blood Gas Arterial Full Panel Unsolicited    Collection Time: 11/07/24  2:13 PM   Result Value Ref Range    POCT pH, Arterial 7.46 (H) 7.38 - 7.42 pH    POCT pCO2, Arterial 24 (L) 38 - 42 mm Hg    POCT pO2, Arterial 341 (H) 85 - 95 mm Hg    POCT SO2, Arterial 100 94 - 100 %    POCT Oxy Hemoglobin, Arterial 97.4 94.0 - 98.0 %    POCT Hematocrit Calculated, Arterial 26.0 (L) 36.0 - 46.0 %    POCT Sodium, Arterial 131 (L) 136 - 145 mmol/L    POCT Potassium, Arterial 5.3 3.5 - 5.3 mmol/L    POCT Chloride, Arterial 103 98 - 107 mmol/L    POCT Ionized Calcium, Arterial 1.03 (L) 1.10 - 1.33 mmol/L    POCT Glucose, Arterial 216 (H) 74 - 99 mg/dL    POCT Lactate, Arterial 2.3 (H) 0.4 - 2.0 mmol/L    POCT Base Excess, Arterial -5.7 (L) -2.0 - 3.0 mmol/L    POCT HCO3 Calculated, Arterial 17.1 (L) 22.0 - 26.0 mmol/L    POCT Hemoglobin, Arterial 8.6 (L) 12.0 - 16.0 g/dL    POCT Anion Gap, Arterial 16 10 - 25 mmo/L    Patient Temperature 37.0 degrees Celsius   Blood Gas Arterial Full Panel    Collection Time: 11/07/24  4:04 PM   Result Value Ref Range    POCT pH, Arterial 7.40 7.38 - 7.42 pH    POCT pCO2, Arterial 32 (L) 38 - 42 mm Hg    POCT pO2, Arterial 164 (H) 85 - 95 mm Hg    POCT SO2, Arterial 99 94 - 100 %    POCT Oxy Hemoglobin, Arterial 96.6 94.0 - 98.0 %    POCT Hematocrit Calculated, Arterial 27.0 (L) 36.0 - 46.0 %    POCT Sodium, Arterial 129 (L) 136 - 145 mmol/L    POCT Potassium, Arterial 6.3 (HH) 3.5 - 5.3 mmol/L    POCT Chloride, Arterial 99 98 - 107 mmol/L    POCT Ionized Calcium, Arterial 1.13 1.10 - 1.33 mmol/L    POCT Glucose, Arterial 206 (H) 74 - 99 mg/dL    POCT Lactate, Arterial 2.6 (H) 0.4 - 2.0 mmol/L    POCT Base Excess, Arterial -4.4 (L) -2.0 - 3.0 mmol/L    POCT HCO3 Calculated, Arterial 19.8 (L) 22.0 - 26.0 mmol/L    POCT  Hemoglobin, Arterial 8.9 (L) 12.0 - 16.0 g/dL    POCT Anion Gap, Arterial 17 10 - 25 mmo/L    Patient Temperature 37.0 degrees Celsius    FiO2 60 %   POCT GLUCOSE    Collection Time: 11/07/24  4:05 PM   Result Value Ref Range    POCT Glucose 246 (H) 74 - 99 mg/dL   Glucose    Collection Time: 11/07/24  4:30 PM   Result Value Ref Range    Glucose 232 (H) 74 - 99 mg/dL   CBC and Auto Differential    Collection Time: 11/07/24  4:30 PM   Result Value Ref Range    WBC 14.2 (H) 4.4 - 11.3 x10*3/uL    nRBC 0.5 (H) 0.0 - 0.0 /100 WBCs    RBC 2.97 (L) 4.00 - 5.20 x10*6/uL    Hemoglobin 9.6 (L) 12.0 - 16.0 g/dL    Hematocrit 29.2 (L) 36.0 - 46.0 %    MCV 98 80 - 100 fL    MCH 32.3 26.0 - 34.0 pg    MCHC 32.9 32.0 - 36.0 g/dL    RDW 18.0 (H) 11.5 - 14.5 %    Platelets 32 (LL) 150 - 450 x10*3/uL    Neutrophils % 80.4 40.0 - 80.0 %    Immature Granulocytes %, Automated 0.9 0.0 - 0.9 %    Lymphocytes % 7.8 13.0 - 44.0 %    Monocytes % 10.2 2.0 - 10.0 %    Eosinophils % 0.5 0.0 - 6.0 %    Basophils % 0.2 0.0 - 2.0 %    Neutrophils Absolute 11.38 (H) 1.20 - 7.70 x10*3/uL    Immature Granulocytes Absolute, Automated 0.13 0.00 - 0.70 x10*3/uL    Lymphocytes Absolute 1.10 (L) 1.20 - 4.80 x10*3/uL    Monocytes Absolute 1.44 (H) 0.10 - 1.00 x10*3/uL    Eosinophils Absolute 0.07 0.00 - 0.70 x10*3/uL    Basophils Absolute 0.03 0.00 - 0.10 x10*3/uL   Magnesium    Collection Time: 11/07/24  4:30 PM   Result Value Ref Range    Magnesium 1.94 1.60 - 2.40 mg/dL   Renal function panel    Collection Time: 11/07/24  4:30 PM   Result Value Ref Range    Glucose 232 (H) 74 - 99 mg/dL    Sodium 134 (L) 136 - 145 mmol/L    Potassium 6.1 (HH) 3.5 - 5.3 mmol/L    Chloride 97 (L) 98 - 107 mmol/L    Bicarbonate 22 21 - 32 mmol/L    Anion Gap 21 (H) 10 - 20 mmol/L    Urea Nitrogen 50 (H) 6 - 23 mg/dL    Creatinine 6.16 (H) 0.50 - 1.05 mg/dL    eGFR 7 (L) >60 mL/min/1.73m*2    Calcium 8.0 (L) 8.6 - 10.6 mg/dL    Phosphorus 4.4 2.5 - 4.9 mg/dL    Albumin 2.7  (L) 3.4 - 5.0 g/dL       ASSESSMENT:  Faviola Pleitez is a  69 y.o. year old with ESRD, admitted for TAVR. Nephrology following for dialysis care.    Impression:  ESRD  Hyperkalemia  S/p cardiac arrest - 11/6    RECOMMENDATIONS:  Start CVVH per submitted orders  Check chemistries BID and replete as necessary per ICU protocol  Fluid removal per primary team  Will follow

## 2024-11-08 NOTE — PROGRESS NOTES
Faviola Pleitez is a 69 y.o. female on day 11 of admission presenting with Severe aortic stenosis.      Subjective   Overnight pt hypothermic to 31.9; started on bear hugger, warming fluids for CVVH, and heat packs applied. Pt continued to come down on her pressor requirements overnight; now on levo 0.08 and vaso 0.03. CVVH ran overnight without issue. Lactate continuing to downtrend.     This am pt on fent and midazolam but still opening her eyes to voice (per nursing when weaned sedation this am she was following commands). Pt on sedation this am during writer's exam and therefore not able to follow commands or nod yes/no to ROS questions.     Objective     Last Recorded Vitals  BP (!) 121/43   Pulse 72   Temp (!) 33.8 °C (92.8 °F)   Resp 14   Wt 127 kg (279 lb 15.8 oz)   SpO2 99%   Intake/Output last 3 Shifts:    Intake/Output Summary (Last 24 hours) at 11/8/2024 1334  Last data filed at 11/8/2024 1100  Gross per 24 hour   Intake 1411.08 ml   Output 495 ml   Net 916.08 ml       Admission Weight  Weight: 119 kg (262 lb 12.6 oz) (10/28/24 0829)    Daily Weight  11/08/24 : 127 kg (279 lb 15.8 oz)    Physical exam:  Constitutional: No acute distress, resting comfortably in bed, intubated and sedated on bear hugger  HEENT: Normocephalic, atraumatic, PERRLA, EOMI, moist mucous membranes  Cardiovascular: RRR, normal S1/S2, no murmurs noted  Pulmonary: Clear to auscultation b/l, no wheezes/crackles/rhonchi, intubated VCAC  GI: Soft, non-tender, non-distended, normoactive bowel sounds, significant bruising on abdomen with umbilical hernia  : L groin with hematoma with improved swelling compared to yesterday. EP evaluated R groin access at bedside with no significant oozing noted  Lower extremities: Warm, dopplerable DP pulses bilaterally per nursing, 1+ LE edema, discoloration of b/l feet (purple tinged) improved from yesterday  Neuro: Sedated on fent and midazolam  Skin: Significant bruising particularly on abdomen  marker with marker, L groin hematoma    Image Results  XR chest 1 view  Narrative: Interpreted By:  Tereso Hampton  and Brad Godfrey   STUDY:  XR CHEST 1 VIEW;  11/8/2024 8:59 am      INDICATION:  Signs/Symptoms:intubated.          Patient underwent TAVR on 11/05/2024      COMPARISON:  Chest x-ray 11/07/2024      ACCESSION NUMBER(S):  [id number]      ORDERING CLINICIAN:  THUY PATTERSON      FINDINGS:  AP radiograph of the chest was provided.      Patient is mildly rotated to the right which limits assessment and  comparison.      Endotracheal tube tip projects 3.6 cm from jean-paul. Status post TAVR.  Esophageal temperature probe. Enteric tube tip projects beyond the  field of view. Left IJ CVC tip projects over the expected location of  the mid superior vena cava.      CARDIOMEDIASTINAL SILHOUETTE:  The heart is moderately enlarged but stable in comparison to previous  study. Scratch      LUNGS:  Hypoexpanded lungs with associated bronchovascular crowding.  Perihilar and bibasilar interstitial prominence, similar as compared  to prior. There is no new focal consolidation. There is no  pneumothorax. The right costophrenic angle is incompletely visualized  but appears clear. The left costophrenic angle is obscured by the  cardiomediastinal silhouette.      ABDOMEN:  No remarkable upper abdominal findings.      BONES:  No acute osseous changes.      Impression: 1. Stable pulmonary aeration.  2. Medical devices as described above.      I personally reviewed the images/study and I agree with the findings  as stated by Bekah Salinas MD (PGY-2). This study was interpreted at  [facility], [place], [place].      MACRO:  None      Signed by: Tereso Hampton 11/8/2024 10:06 AM  Dictation workstation:   [id number]  XR chest 1 view  Narrative: Interpreted By:  Tereso Hampton,   STUDY:  XR CHEST 1 VIEW;  11/7/2024 4:17 pm      INDICATION:  Signs/Symptoms:post-pacemaker.      COMPARISON:  Chest  radiograph dated 11/7/2024, 7:21 a.m.      ACCESSION NUMBER(S):  [id number]      ORDERING CLINICIAN:  PRIMO LUJAN      FINDINGS:  AP radiograph of the chest      The endotracheal tube is proximally 2.3 cm above the jean-paul. TAVR is  noted. The left internal jugular catheter is positioned within  superior vena cava. The enteric tube traverses the esophagus.      There is moderate cardiomegaly which is stable in comparison to the  prior study.      The left diaphragm and left ventricular apex are obscured. Pulmonary  aeration is similar previous study.      Impression: 1. Pulmonary aeration is similar to previous study.              Signed by: Tereso Hampton 11/8/2024 7:31 AM  Dictation workstation:   [id number]  XR chest 1 view  Narrative: Interpreted By:  Tereso Hampton and Omar Mahmoud   STUDY:  XR CHEST 1 VIEW;  11/7/2024 7:30 am      INDICATION:  Signs/Symptoms:tvp.          COMPARISON:  Chest x-ray 11/07/2024 7:21 a.m.      ACCESSION NUMBER(S):  [id number]      ORDERING CLINICIAN:  THUY PATTERSON      FINDINGS:  AP radiograph of the chest was provided.      Status post TAVR. Endotracheal tube tip projects 2.3 cm from the  jean-paul. Left IJ central venous catheter tip projects over the mid to  distal superior vena cava. Enteric tube tip projects beyond the field  of view. Left IJ approach cardiac pacer has been removed.      CARDIOMEDIASTINAL SILHOUETTE:  Cardiomediastinal silhouette is stable in size and configuration.      LUNGS:  The lungs are hypoexpanded with associated bronchovascular crowding.  Mild perihilar and bibasilar interstitial prominence, grossly stable  as compared to prior. Left costophrenic angle is minimally blunted or  obscured by the cardiomediastinal silhouette, stable as compared to  prior.      ABDOMEN:  No remarkable upper abdominal findings.      BONES:  No acute osseous changes.      Impression: 1. Hypoexpanded lungs with stable mild pulmonary interstitial edema.  2. Interval removal of  left IJ approach cardiac pacer. Otherwise,  medical devices as described above.      I personally reviewed the images/study and I agree with the findings  as stated by Bekah Salinas MD (PGY-2). This study was interpreted at  [facility], [place], [place].      MACRO:  None      Signed by: Tereso Hampton 11/8/2024 7:20 AM  Dictation workstation:   [id number]      Relevant Results  Scheduled medications  [Held by provider] amiodarone, 200 mg, oral, Daily  [Held by provider] apixaban, 5 mg, oral, BID  aspirin, 81 mg, oral, Daily  atorvastatin, 40 mg, oral, q AM  clopidogrel, 75 mg, oral, Daily  pantoprazole, 40 mg, oral, Daily before breakfast   Or  esomeprazole, 40 mg, nasoduodenal tube, Daily before breakfast   Or  pantoprazole, 40 mg, intravenous, Daily before breakfast  fluticasone furoate-vilanteroL, 1 puff, inhalation, Daily  insulin lispro, 0-10 Units, subcutaneous, q4h  melatonin, 6 mg, oral, Nightly  meropenem, 1,000 mg, intravenous, q12h  oxygen, , inhalation, Continuous - Inhalation  perflutren lipid microspheres, 0.5-10 mL of dilution, intravenous, Once in imaging  polyethylene glycol, 17 g, oral, Daily  sennosides-docusate sodium, 1 tablet, oral, Nightly  sodium chloride, 1,000 mL, CRRT, Once  vitamin B complex-vitamin C-folic acid, 1 capsule, oral, Daily      Continuous medications  fentaNYL,  mcg/hr, Last Rate: 50 mcg/hr (11/08/24 0400)  midazolam, 0-20 mg/hr, Last Rate: 1 mg/hr (11/08/24 0400)  norepinephrine, 0-0.2 mcg/kg/min, Last Rate: 0.14 mcg/kg/min (11/08/24 1300)  PrismaSol BGK 2/3.5, 25 mL/kg/hr, Last Rate: 25 mL/kg/hr (11/08/24 0736)  vasopressin, 0.03 Units/min, Last Rate: 0.5 Units/min (11/08/24 0946)      PRN medications  PRN medications: acetaminophen **OR** acetaminophen **OR** acetaminophen, bisacodyl, dextrose, glucagon, glucagon, glucagon HCL, melatonin, midazolam, midazolam, ondansetron, polyethylene glycol, sodium chloride, vancomycin    Results for  orders placed or performed during the hospital encounter of 10/28/24 (from the past 24 hours)   Blood Gas Arterial Full Panel Unsolicited   Result Value Ref Range    POCT pH, Arterial 7.46 (H) 7.38 - 7.42 pH    POCT pCO2, Arterial 24 (L) 38 - 42 mm Hg    POCT pO2, Arterial 341 (H) 85 - 95 mm Hg    POCT SO2, Arterial 100 94 - 100 %    POCT Oxy Hemoglobin, Arterial 97.4 94.0 - 98.0 %    POCT Hematocrit Calculated, Arterial 26.0 (L) 36.0 - 46.0 %    POCT Sodium, Arterial 131 (L) 136 - 145 mmol/L    POCT Potassium, Arterial 5.3 3.5 - 5.3 mmol/L    POCT Chloride, Arterial 103 98 - 107 mmol/L    POCT Ionized Calcium, Arterial 1.03 (L) 1.10 - 1.33 mmol/L    POCT Glucose, Arterial 216 (H) 74 - 99 mg/dL    POCT Lactate, Arterial 2.3 (H) 0.4 - 2.0 mmol/L    POCT Base Excess, Arterial -5.7 (L) -2.0 - 3.0 mmol/L    POCT HCO3 Calculated, Arterial 17.1 (L) 22.0 - 26.0 mmol/L    POCT Hemoglobin, Arterial 8.6 (L) 12.0 - 16.0 g/dL    POCT Anion Gap, Arterial 16 10 - 25 mmo/L    Patient Temperature 37.0 degrees Celsius   Blood Gas Arterial Full Panel   Result Value Ref Range    POCT pH, Arterial 7.40 7.38 - 7.42 pH    POCT pCO2, Arterial 32 (L) 38 - 42 mm Hg    POCT pO2, Arterial 164 (H) 85 - 95 mm Hg    POCT SO2, Arterial 99 94 - 100 %    POCT Oxy Hemoglobin, Arterial 96.6 94.0 - 98.0 %    POCT Hematocrit Calculated, Arterial 27.0 (L) 36.0 - 46.0 %    POCT Sodium, Arterial 129 (L) 136 - 145 mmol/L    POCT Potassium, Arterial 6.3 (HH) 3.5 - 5.3 mmol/L    POCT Chloride, Arterial 99 98 - 107 mmol/L    POCT Ionized Calcium, Arterial 1.13 1.10 - 1.33 mmol/L    POCT Glucose, Arterial 206 (H) 74 - 99 mg/dL    POCT Lactate, Arterial 2.6 (H) 0.4 - 2.0 mmol/L    POCT Base Excess, Arterial -4.4 (L) -2.0 - 3.0 mmol/L    POCT HCO3 Calculated, Arterial 19.8 (L) 22.0 - 26.0 mmol/L    POCT Hemoglobin, Arterial 8.9 (L) 12.0 - 16.0 g/dL    POCT Anion Gap, Arterial 17 10 - 25 mmo/L    Patient Temperature 37.0 degrees Celsius    FiO2 60 %   POCT  GLUCOSE   Result Value Ref Range    POCT Glucose 246 (H) 74 - 99 mg/dL   Glucose   Result Value Ref Range    Glucose 232 (H) 74 - 99 mg/dL   CBC and Auto Differential   Result Value Ref Range    WBC 14.2 (H) 4.4 - 11.3 x10*3/uL    nRBC 0.5 (H) 0.0 - 0.0 /100 WBCs    RBC 2.97 (L) 4.00 - 5.20 x10*6/uL    Hemoglobin 9.6 (L) 12.0 - 16.0 g/dL    Hematocrit 29.2 (L) 36.0 - 46.0 %    MCV 98 80 - 100 fL    MCH 32.3 26.0 - 34.0 pg    MCHC 32.9 32.0 - 36.0 g/dL    RDW 18.0 (H) 11.5 - 14.5 %    Platelets 32 (LL) 150 - 450 x10*3/uL    Neutrophils % 80.4 40.0 - 80.0 %    Immature Granulocytes %, Automated 0.9 0.0 - 0.9 %    Lymphocytes % 7.8 13.0 - 44.0 %    Monocytes % 10.2 2.0 - 10.0 %    Eosinophils % 0.5 0.0 - 6.0 %    Basophils % 0.2 0.0 - 2.0 %    Neutrophils Absolute 11.38 (H) 1.20 - 7.70 x10*3/uL    Immature Granulocytes Absolute, Automated 0.13 0.00 - 0.70 x10*3/uL    Lymphocytes Absolute 1.10 (L) 1.20 - 4.80 x10*3/uL    Monocytes Absolute 1.44 (H) 0.10 - 1.00 x10*3/uL    Eosinophils Absolute 0.07 0.00 - 0.70 x10*3/uL    Basophils Absolute 0.03 0.00 - 0.10 x10*3/uL   Magnesium   Result Value Ref Range    Magnesium 1.94 1.60 - 2.40 mg/dL   Renal function panel   Result Value Ref Range    Glucose 232 (H) 74 - 99 mg/dL    Sodium 134 (L) 136 - 145 mmol/L    Potassium 6.1 (HH) 3.5 - 5.3 mmol/L    Chloride 97 (L) 98 - 107 mmol/L    Bicarbonate 22 21 - 32 mmol/L    Anion Gap 21 (H) 10 - 20 mmol/L    Urea Nitrogen 50 (H) 6 - 23 mg/dL    Creatinine 6.16 (H) 0.50 - 1.05 mg/dL    eGFR 7 (L) >60 mL/min/1.73m*2    Calcium 8.0 (L) 8.6 - 10.6 mg/dL    Phosphorus 4.4 2.5 - 4.9 mg/dL    Albumin 2.7 (L) 3.4 - 5.0 g/dL   POCT GLUCOSE   Result Value Ref Range    POCT Glucose 213 (H) 74 - 99 mg/dL   CBC and Auto Differential   Result Value Ref Range    WBC 15.8 (H) 4.4 - 11.3 x10*3/uL    nRBC 0.4 (H) 0.0 - 0.0 /100 WBCs    RBC 2.72 (L) 4.00 - 5.20 x10*6/uL    Hemoglobin 8.7 (L) 12.0 - 16.0 g/dL    Hematocrit 26.9 (L) 36.0 - 46.0 %    MCV 99  80 - 100 fL    MCH 32.0 26.0 - 34.0 pg    MCHC 32.3 32.0 - 36.0 g/dL    RDW 18.4 (H) 11.5 - 14.5 %    Platelets 42 (L) 150 - 450 x10*3/uL    Neutrophils % 83.1 40.0 - 80.0 %    Immature Granulocytes %, Automated 0.9 0.0 - 0.9 %    Lymphocytes % 7.1 13.0 - 44.0 %    Monocytes % 7.8 2.0 - 10.0 %    Eosinophils % 0.8 0.0 - 6.0 %    Basophils % 0.3 0.0 - 2.0 %    Neutrophils Absolute 13.15 (H) 1.20 - 7.70 x10*3/uL    Immature Granulocytes Absolute, Automated 0.15 0.00 - 0.70 x10*3/uL    Lymphocytes Absolute 1.12 (L) 1.20 - 4.80 x10*3/uL    Monocytes Absolute 1.23 (H) 0.10 - 1.00 x10*3/uL    Eosinophils Absolute 0.13 0.00 - 0.70 x10*3/uL    Basophils Absolute 0.05 0.00 - 0.10 x10*3/uL   Renal function panel   Result Value Ref Range    Glucose 229 (H) 74 - 99 mg/dL    Sodium 132 (L) 136 - 145 mmol/L    Potassium 5.3 3.5 - 5.3 mmol/L    Chloride 97 (L) 98 - 107 mmol/L    Bicarbonate 23 21 - 32 mmol/L    Anion Gap 17 10 - 20 mmol/L    Urea Nitrogen 43 (H) 6 - 23 mg/dL    Creatinine 5.26 (H) 0.50 - 1.05 mg/dL    eGFR 8 (L) >60 mL/min/1.73m*2    Calcium 8.5 (L) 8.6 - 10.6 mg/dL    Phosphorus 4.5 2.5 - 4.9 mg/dL    Albumin 2.7 (L) 3.4 - 5.0 g/dL   Blood Gas Arterial Full Panel   Result Value Ref Range    POCT pH, Arterial 7.37 (L) 7.38 - 7.42 pH    POCT pCO2, Arterial 36 (L) 38 - 42 mm Hg    POCT pO2, Arterial 180 (H) 85 - 95 mm Hg    POCT SO2, Arterial 99 94 - 100 %    POCT Oxy Hemoglobin, Arterial 96.7 94.0 - 98.0 %    POCT Hematocrit Calculated, Arterial 26.0 (L) 36.0 - 46.0 %    POCT Sodium, Arterial 131 (L) 136 - 145 mmol/L    POCT Potassium, Arterial 5.3 3.5 - 5.3 mmol/L    POCT Chloride, Arterial 100 98 - 107 mmol/L    POCT Ionized Calcium, Arterial 1.15 1.10 - 1.33 mmol/L    POCT Glucose, Arterial 225 (H) 74 - 99 mg/dL    POCT Lactate, Arterial 2.4 (H) 0.4 - 2.0 mmol/L    POCT Base Excess, Arterial -4.0 (L) -2.0 - 3.0 mmol/L    POCT HCO3 Calculated, Arterial 20.8 (L) 22.0 - 26.0 mmol/L    POCT Hemoglobin, Arterial 8.8  (L) 12.0 - 16.0 g/dL    POCT Anion Gap, Arterial 16 10 - 25 mmo/L    Patient Temperature 37.0 degrees Celsius    FiO2 50 %   Blood Gas Arterial Full Panel   Result Value Ref Range    POCT pH, Arterial 7.36 (L) 7.38 - 7.42 pH    POCT pCO2, Arterial 37 (L) 38 - 42 mm Hg    POCT pO2, Arterial 108 (H) 85 - 95 mm Hg    POCT SO2, Arterial 98 94 - 100 %    POCT Oxy Hemoglobin, Arterial 95.3 94.0 - 98.0 %    POCT Hematocrit Calculated, Arterial 26.0 (L) 36.0 - 46.0 %    POCT Sodium, Arterial 130 (L) 136 - 145 mmol/L    POCT Potassium, Arterial 4.6 3.5 - 5.3 mmol/L    POCT Chloride, Arterial 100 98 - 107 mmol/L    POCT Ionized Calcium, Arterial 1.20 1.10 - 1.33 mmol/L    POCT Glucose, Arterial 214 (H) 74 - 99 mg/dL    POCT Lactate, Arterial 2.3 (H) 0.4 - 2.0 mmol/L    POCT Base Excess, Arterial -4.1 (L) -2.0 - 3.0 mmol/L    POCT HCO3 Calculated, Arterial 20.9 (L) 22.0 - 26.0 mmol/L    POCT Hemoglobin, Arterial 8.8 (L) 12.0 - 16.0 g/dL    POCT Anion Gap, Arterial 14 10 - 25 mmo/L    Patient Temperature 37.0 degrees Celsius    FiO2 30 %   POCT GLUCOSE   Result Value Ref Range    POCT Glucose 213 (H) 74 - 99 mg/dL   Magnesium   Result Value Ref Range    Magnesium 1.86 1.60 - 2.40 mg/dL   Blood Gas Arterial Full Panel   Result Value Ref Range    POCT pH, Arterial 7.38 7.38 - 7.42 pH    POCT pCO2, Arterial 37 (L) 38 - 42 mm Hg    POCT pO2, Arterial 121 (H) 85 - 95 mm Hg    POCT SO2, Arterial 99 94 - 100 %    POCT Oxy Hemoglobin, Arterial 96.3 94.0 - 98.0 %    POCT Hematocrit Calculated, Arterial 26.0 (L) 36.0 - 46.0 %    POCT Sodium, Arterial 130 (L) 136 - 145 mmol/L    POCT Potassium, Arterial 4.5 3.5 - 5.3 mmol/L    POCT Chloride, Arterial      POCT Ionized Calcium, Arterial 1.23 1.10 - 1.33 mmol/L    POCT Glucose, Arterial 199 (H) 74 - 99 mg/dL    POCT Lactate, Arterial 2.2 (H) 0.4 - 2.0 mmol/L    POCT Base Excess, Arterial -2.9 (L) -2.0 - 3.0 mmol/L    POCT HCO3 Calculated, Arterial 21.9 (L) 22.0 - 26.0 mmol/L    POCT  Hemoglobin, Arterial 8.6 (L) 12.0 - 16.0 g/dL    POCT Anion Gap, Arterial      Patient Temperature 37.0 degrees Celsius    FiO2 30 %   CBC and Auto Differential   Result Value Ref Range    WBC 12.6 (H) 4.4 - 11.3 x10*3/uL    nRBC 0.4 (H) 0.0 - 0.0 /100 WBCs    RBC 2.67 (L) 4.00 - 5.20 x10*6/uL    Hemoglobin 8.6 (L) 12.0 - 16.0 g/dL    Hematocrit 26.1 (L) 36.0 - 46.0 %    MCV 98 80 - 100 fL    MCH 32.2 26.0 - 34.0 pg    MCHC 33.0 32.0 - 36.0 g/dL    RDW 17.9 (H) 11.5 - 14.5 %    Platelets 36 (LL) 150 - 450 x10*3/uL    Neutrophils % 80.0 40.0 - 80.0 %    Immature Granulocytes %, Automated 1.0 (H) 0.0 - 0.9 %    Lymphocytes % 10.2 13.0 - 44.0 %    Monocytes % 7.4 2.0 - 10.0 %    Eosinophils % 1.2 0.0 - 6.0 %    Basophils % 0.2 0.0 - 2.0 %    Neutrophils Absolute 10.07 (H) 1.20 - 7.70 x10*3/uL    Immature Granulocytes Absolute, Automated 0.12 0.00 - 0.70 x10*3/uL    Lymphocytes Absolute 1.29 1.20 - 4.80 x10*3/uL    Monocytes Absolute 0.93 0.10 - 1.00 x10*3/uL    Eosinophils Absolute 0.15 0.00 - 0.70 x10*3/uL    Basophils Absolute 0.03 0.00 - 0.10 x10*3/uL   Renal function panel   Result Value Ref Range    Glucose 205 (H) 74 - 99 mg/dL    Sodium 134 (L) 136 - 145 mmol/L    Potassium 4.5 3.5 - 5.3 mmol/L    Chloride 98 98 - 107 mmol/L    Bicarbonate 24 21 - 32 mmol/L    Anion Gap 17 10 - 20 mmol/L    Urea Nitrogen 34 (H) 6 - 23 mg/dL    Creatinine 3.92 (H) 0.50 - 1.05 mg/dL    eGFR 12 (L) >60 mL/min/1.73m*2    Calcium 8.9 8.6 - 10.6 mg/dL    Phosphorus 4.0 2.5 - 4.9 mg/dL    Albumin 2.6 (L) 3.4 - 5.0 g/dL   Vancomycin   Result Value Ref Range    Vancomycin 22.6 (H) 5.0 - 20.0 ug/mL   POCT GLUCOSE   Result Value Ref Range    POCT Glucose 173 (H) 74 - 99 mg/dL   POCT GLUCOSE   Result Value Ref Range    POCT Glucose 190 (H) 74 - 99 mg/dL   POCT GLUCOSE   Result Value Ref Range    POCT Glucose 191 (H) 74 - 99 mg/dL   POCT GLUCOSE   Result Value Ref Range    POCT Glucose 187 (H) 74 - 99 mg/dL   Renal function panel   Result  Value Ref Range    Glucose 204 (H) 74 - 99 mg/dL    Sodium 135 (L) 136 - 145 mmol/L    Potassium 4.5 3.5 - 5.3 mmol/L    Chloride 100 98 - 107 mmol/L    Bicarbonate 23 21 - 32 mmol/L    Anion Gap 17 10 - 20 mmol/L    Urea Nitrogen 28 (H) 6 - 23 mg/dL    Creatinine 3.15 (H) 0.50 - 1.05 mg/dL    eGFR 16 (L) >60 mL/min/1.73m*2    Calcium 9.1 8.6 - 10.6 mg/dL    Phosphorus 4.1 2.5 - 4.9 mg/dL    Albumin 2.7 (L) 3.4 - 5.0 g/dL   Blood Gas Arterial Full Panel   Result Value Ref Range    POCT pH, Arterial 7.35 (L) 7.38 - 7.42 pH    POCT pCO2, Arterial 39 38 - 42 mm Hg    POCT pO2, Arterial 137 (H) 85 - 95 mm Hg    POCT SO2, Arterial      POCT Oxy Hemoglobin, Arterial      POCT Hematocrit Calculated, Arterial      POCT Sodium, Arterial 132 (L) 136 - 145 mmol/L    POCT Potassium, Arterial 4.5 3.5 - 5.3 mmol/L    POCT Chloride, Arterial 99 98 - 107 mmol/L    POCT Ionized Calcium, Arterial 1.29 1.10 - 1.33 mmol/L    POCT Glucose, Arterial 194 (H) 74 - 99 mg/dL    POCT Lactate, Arterial 3.0 (H) 0.4 - 2.0 mmol/L    POCT Base Excess, Arterial -3.8 (L) -2.0 - 3.0 mmol/L    POCT HCO3 Calculated, Arterial 21.5 (L) 22.0 - 26.0 mmol/L    POCT Hemoglobin, Arterial      POCT Anion Gap, Arterial 16 10 - 25 mmo/L    Patient Temperature 37.0 degrees Celsius    FiO2 30 %   POCT GLUCOSE   Result Value Ref Range    POCT Glucose 203 (H) 74 - 99 mg/dL       XR chest 1 view    Result Date: 11/8/2024  Interpreted By:  Tereso Hampton and Omar Mahmoud STUDY: XR CHEST 1 VIEW;  11/8/2024 8:59 am   INDICATION: Signs/Symptoms:intubated.     Patient underwent TAVR on 11/05/2024   COMPARISON: Chest x-ray 11/07/2024   ACCESSION NUMBER(S): [id number]   ORDERING CLINICIAN: THUY PATTERSON   FINDINGS: AP radiograph of the chest was provided.   Patient is mildly rotated to the right which limits assessment and comparison.   Endotracheal tube tip projects 3.6 cm from jean-paul. Status post TAVR. Esophageal temperature probe. Enteric tube tip projects beyond  the field of view. Left IJ CVC tip projects over the expected location of the mid superior vena cava.   CARDIOMEDIASTINAL SILHOUETTE: The heart is moderately enlarged but stable in comparison to previous study. Scratch   LUNGS: Hypoexpanded lungs with associated bronchovascular crowding. Perihilar and bibasilar interstitial prominence, similar as compared to prior. There is no new focal consolidation. There is no pneumothorax. The right costophrenic angle is incompletely visualized but appears clear. The left costophrenic angle is obscured by the cardiomediastinal silhouette.   ABDOMEN: No remarkable upper abdominal findings.   BONES: No acute osseous changes.       1. Stable pulmonary aeration. 2. Medical devices as described above.   I personally reviewed the images/study and I agree with the findings as stated by Bekah Salinas MD (PGY-2). This study was interpreted at [place], [place].   MACRO: None   Signed by: Tereso Hampton 11/8/2024 10:06 AM Dictation workstation:   [id number]    XR chest 1 view    Result Date: 11/8/2024  Interpreted By:  Tereso Hampton, STUDY: XR CHEST 1 VIEW;  11/7/2024 4:17 pm   INDICATION: Signs/Symptoms:post-pacemaker.   COMPARISON: Chest radiograph dated 11/7/2024, 7:21 a.m.   ACCESSION NUMBER(S): [id number]   ORDERING CLINICIAN: PRIMO LUJAN   FINDINGS: AP radiograph of the chest   The endotracheal tube is proximally 2.3 cm above the jean-paul. TAVR is noted. The left internal jugular catheter is positioned within superior vena cava. The enteric tube traverses the esophagus.   There is moderate cardiomegaly which is stable in comparison to the prior study.   The left diaphragm and left ventricular apex are obscured. Pulmonary aeration is similar previous study.       1. Pulmonary aeration is similar to previous study.       Signed by: Tereso Hampton 11/8/2024 7:31 AM Dictation workstation:   [id number]    XR chest 1 view    Result Date:  11/8/2024  Interpreted By:  Tereso Hampton  and Brad Godfrey STUDY: XR CHEST 1 VIEW;  11/7/2024 7:30 am   INDICATION: Signs/Symptoms:tvp.     COMPARISON: Chest x-ray 11/07/2024 7:21 a.m.   ACCESSION NUMBER(S): [id number]   ORDERING CLINICIAN: THUY PATTERSON   FINDINGS: AP radiograph of the chest was provided.   Status post TAVR. Endotracheal tube tip projects 2.3 cm from the jean-paul. Left IJ central venous catheter tip projects over the mid to distal superior vena cava. Enteric tube tip projects beyond the field of view. Left IJ approach cardiac pacer has been removed.   CARDIOMEDIASTINAL SILHOUETTE: Cardiomediastinal silhouette is stable in size and configuration.   LUNGS: The lungs are hypoexpanded with associated bronchovascular crowding. Mild perihilar and bibasilar interstitial prominence, grossly stable as compared to prior. Left costophrenic angle is minimally blunted or obscured by the cardiomediastinal silhouette, stable as compared to prior.   ABDOMEN: No remarkable upper abdominal findings.   BONES: No acute osseous changes.       1. Hypoexpanded lungs with stable mild pulmonary interstitial edema. 2. Interval removal of left IJ approach cardiac pacer. Otherwise, medical devices as described above.   I personally reviewed the images/study and I agree with the findings as stated by Bekah Salinas MD (PGY-2). This study was interpreted at [place], [place].   MACRO: None   Signed by: Tereso Hampton 11/8/2024 7:20 AM Dictation workstation:   [id number]    Electrophysiology procedure    Result Date: 11/7/2024  Leadless pacemaker implantation Procedures: Leadless PPM Implant (02047), Ultrasound Guided vascular access (95104), Placement of temporary dialysis catheter (78639), Insert temporary transvenous pacing electrode (03111) Patient history: Please refer to the detailed history and physical on the patient's medical chart. Procedure narrative: The patient was in the  fasting state. A baseline ECG was recorded. The patient was set up for continuous monitoring of surface 12 lead ECG and pulse oximetry. Blood pressure was monitored with automatic cuff measurements. The procedure was performed under IV conscious sedation supplemented with intermittent moderate sedation. Bilateral groins were clipped, prepped, and draped in the usual sterile fashion. The Right common femoral vein was evaluated with ultrasound, it was normal in size and anatomy.  The vein was accessed x2 using ultrasound guidance and a 18G Cook needle, with direct visualization of the needle at all times. Images were saved for the both vein access. A 8F and 6F sheath were advanced into the vein. Through the 6F sheath, a quadrapolar pacing catheter was inserted into the RV and appropriate capture and sensing parameters were met. Two GRAYLse devices were inserted and prepared with a Preclose technique. A long stiff Amplatz wire was advanced through the existing 8F sheath upto the SVC. The existing sheath was removed. Using a 20F akash dilator, the venotomy was dilated upto 20F. 0.1 mg/kg of IV bivalirudin given the clinical concern for possible HIT. The procedure was performed with a heparin-free environment. A 23F Medtronic introducer sheath was then advanced over the wire. The dilator was removed. The sheath was aspirated and connected to flush line with saline Under fluoroscopic guidance, the leadless pacemaker was advanced to the heart. With GANT and Swedish fluoroscopy, the pacemaker was advanced to the midseptum. Contrast venography was also perfromed through the sheath to confirm mid-apical septal location. Appropriate electrical measurements were obtained. Cine documented appropriate deployment of the leadless pacemaker. A tug-test was done at this time, under Cine fluoroscopy 3/4 tines were noted to flex with the tug. The existing balloon-tipped temporary pacing catheter was removed from the LIJ sheath and  the quadrapolar pacing catheter was removed from the right femoral sheath under fluoroscopy and the Micra position was not affected. At this time the anchor suture was cut, and removed under fluoroscopy. The delivery sheath was then removed. Electrical measurements were assessed and were appropriate. The sheath was then removed. Hemostasis was achieved with deployment of the 2 Perclose devices. Manual pressure was applied. The existing LIJ sheath was cleaned with betadine and chlorhexidine. A 75F Amplatz wire was introduced into the sheath and advanced to the RA. The sheath was removed, an incision was made adjacent to the wire, the tract was sequentially dilated with the two dilators in the 20cm Trialysis kit, and the Trialysis catheter was introduced over the wire to the SVC. The Amplatz wire was removed and the catheter was sutured in place and a CHG dressing was applied over the catheter. Summary: Successful leadless pacemaker implant Medtronic (MICRA AV) Successful exchange over a wire of existing LIJ sehath for 20cm dialysis catheter. RFV access closure: Vascade MVP Final Implant Settings: : Medtronic Device: Leadless pacemaker (Serial #[id number]) Lead Parameters RVent: 720 ohms, R wave 8.0mV, threshold 1.38V@0.24msec Pacing Mode: VVI Lower rate 70bpm  Post implant device parameters scanned into the system Recommendations: Tentatively patient can be discharged from  perspective after PA-lateral chest xray and device interrogation are done and provided the recovery parameters are appropriate. Bedrest for 3hours post-sheath removal A PA-lateral chest X-ray should be performed and telemetry monitoring continued for 24 hours. A 12 lead ECG should be performed prior to discharge from the hospital. The patient should continue with the present medications. Routine dialysis catheter care See complete procedural log and parameters.     XR chest 1 view    Result Date: 11/7/2024  Interpreted By:    Tereso  and Natalya Vigil STUDY: XR CHEST 1 VIEW;  11/6/2024 7:07 pm   INDICATION: Signs/Symptoms:saturation.   COMPARISON: Same day chest radiograph time stamped 4:28 p.m.   ACCESSION NUMBER(S): [id number]   ORDERING CLINICIAN: THUY PATTERSON   FINDINGS: AP radiograph of the chest was provided.   Endotracheal tube in place with tip projecting 4.8 cm above the jean-paul. Enteric tube courses over the mid-thorax and below the diaphragm with tip projecting over the gastric body. Left internal jugular approach transvenous pacing wire in similar positioning. Status post TAVR.   CARDIOMEDIASTINAL SILHOUETTE: Moderate to moderately large cardiomegaly. Cardiomediastinal silhouette is stable in size and configuration.   LUNGS: Hazy interstitial opacities throughout the bilateral lungs, similar to prior. Mild blunting of the left costophrenic angle. The right costophrenic angle is excluded from the field of view. Prominence of the right perihilar structures. No pneumothorax.   ABDOMEN: No remarkable upper abdominal findings.   BONES: No acute osseous abnormality.       1. Hazy interstitial edema and atelectasis, similar to prior, with a trace left pleural effusion. 2. Prominence of the right perihilar structures may also indicate interstitial edema and atelectasis or vascular congestion. 3. Medical devices as above.   I personally reviewed the image(s)/study and resident interpretation as stated by Dr. Yaritza Hoang MD. I agree with the findings as stated. This study was interpreted at [facility], [place], [place].   MACRO: None   Signed by: Tereso Hampton 11/7/2024 3:49 PM Dictation workstation:   [id number]    Vascular US Lower Extremity Pseudoaneurysm Evaluation Duplex Left    Result Date: 11/7/2024            [place] [address]   Tel [phone] and Fax [phone]  Vascular Lab Report VASC US LOWER EXTREMITY PSEUDOANEURYSM DUPLEX  LEFT  Patient Name:      JUDY SAGASTUME       Reading Physician:  [id number] Vaughn Castillo DO Study Date:        11/7/2024            Ordering Physician: [id number] THUY PATTERSON MRN/PID:           [id number]             Technologist:       Sandoval Santana RVT, RDMS Accession#:        [id number]         Technologist 2: Date of Birth/Age: 1955 / 69 years Encounter#:         [id number] Gender:            F Admission Status:  Inpatient            Location Performed: [facility]  Diagnosis/ICD: Atherosclerosis of other arteries-I70.8 CPT Codes:     41108 Peripheral artery Lower arterial Duplex limited  Patient History R/O Pseudo left.  **CRITICAL RESULT** Critical Result: Lt DEIA psesudo Notification called to Sr. Patterson on 11/7/2024 at 9:50:00 AM by HP at bedside.  CONCLUSIONS: Left Lower Arterial: The pseudoaneurism is noted to measures aprox. 1.6x1.3x2.5cm. 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 No evidence of DVT in the visualized vessels.  Imaging & Doppler Findings:  Right       Left  PSV        PSV        cm/s       CFA 88 cm/s       SFA 81 cm/s       PFA 39 cm/s  Left Pseudo Aneurysm                     Diam Left Pseudoaneurysm 2.5 cm  [id number] Vaughn Castillo DO Electronically signed by [id number] Vaughn Castillo DO on 11/7/2024 at 1:00:21 PM  ** Final **     ECG 12 lead    Result Date: 11/7/2024  Ventricular-paced rhythm Abnormal ECG When compared with ECG of 06-NOV-2024 06:16, Previous ECG has undetermined rhythm, needs review    XR chest 1 view    Result Date: 11/7/2024  Interpreted By:  Tereso Hampton and Omar Mahmoud STUDY: XR CHEST 1 VIEW;  11/6/2024 4:53 pm   INDICATION: Signs/Symptoms:intubation.     COMPARISON: Chest x-ray 11/06/2024 3:12 p.m.   ACCESSION NUMBER(S):  [id number]   ORDERING CLINICIAN: THUY PATTERSON   FINDINGS: AP radiograph of the chest was provided.   Stable postoperative changes from TAVR. Left IJ cardiac pacing wire with tip projecting over the right ventricle, similar as compared to prior. Interval placement of an endotracheal tube with tip projecting 3.6 cm from jean-paul.   CARDIOMEDIASTINAL SILHOUETTE: The heart is moderately severely enlarged stable in size and configuration.   LUNGS: Perihilar and bibasilar predominant opacities are mildly increased as compared to prior. Left costophrenic angle is obscured by the cardiomediastinal silhouette. Stable right subsegmental atelectasis. The right costophrenic angle is clear. There is no pneumothorax.   ABDOMEN: No remarkable upper abdominal findings.   BONES: No acute osseous changes.       1. Interval placement of an endotracheal tube in adequate position. Other medical devices as described above. 2. Mild increase in interstitial and alveolar pulmonary edema. 3. Stable right basilar subsegmental atelectasis.   I personally reviewed the images/study and I agree with the findings as stated by Bekah Salinas MD (PGY-2). This study was interpreted at [place], [place].   MACRO: None   Signed by: Tereso Hampton 11/7/2024 7:40 AM Dictation workstation:   [id number]    Vascular US lower extremity pseudoaneurysm evaluation duplex bilateral    Result Date: 11/7/2024  Interpreted By:  Shelton Pearce and Liller Gregory EXAMINATION: [facility] US LOWER EXTREMITY PSEUDOANEURYSM EVAL DUPLEX BILATERAL; 11/6/2024 8:17 pm   ORDERING PROVIDER: THUY PATTERSON   CLINICAL HISTORY: 70 y/o   F with  Signs/Symptoms:concern for pseudoaneurysm/hematoma after groin access   COMPARISON: None.   TECHNIQUE: Multiple sonographic images of the left groin were obtained.   FINDINGS: This examination is limited due to patient body habitus.     Limited ultrasound of the left groin within the area of concern  reveals a heterogeneous fluid collection with lace-like internal echogenicity measuring 3.7 x 2.8 x 2.5 cm most consistent with hematoma from recent groin access.   The hematoma is in close proximity to a vascular structure, likely the femoral artery and vein.   No evidence of to and fro flow within the hematoma (Yin Villegas sign) to suggest pseudoaneurysm.       A large hematoma is noted within the left groin measuring 3.7 x 2.8 x 2.5 cm which is in close proximity to vascular structures, likely left common femoral artery and vein without evidence to suggest pseudoaneurysm within limitations as described above. If there is persistent clinical concern for pseudoaneurysm or active bleeding, a CTA of the pelvis extending to the proximal thigh could be obtained for further evaluation.   I personally reviewed the images/study and I agree with the findings as stated above by resident physician, Dr. Benjamin Gómez.   Signed by: Shelton Pearce 11/7/2024 5:58 AM Dictation workstation:   [id number]    Cardiac Catheterization Procedure    Result Date: 11/6/2024   [facility], Cath Lab, [address] [place] [address] Cardiovascular Catheterization Report Patient Name:      JUDY SAGASTUME       Performing Physician:  [id number] Naman Maher DO Study Date:        11/6/2024            Verifying Physician:   [id number] Naman Maher DO MRN/PID:           [id number]             Cardiologist/Co-Scrub: Accession#:        [id number]         Ordering Provider:     MELY INVASIVE                                                                CARDIOLOGIST                                                                LYUBOV Date of Birth/Age: 1955 / 69 years Cardiologist: Gender:            F                    Fellow:                [id number] Del Real                                                                 Ace HARRIS Admit Date:                             Fellow:                [id number] Richard Steele MD Encounter#:        [id number]           Surgeon:  Study: TVP  Indications: Suspected coronary artery disease. Medical History: Frailty status of patient entering lab: 4 = Vulnerable.  Procedure Description: After infiltration of local anesthetic, the left internal jugular vein was identified with two-dimensional ultrasound. Under direct ultrasound visualization, the left internal jugular vein was cannulated with a micropuncture technique. A 8 Amharic sheath was placed in the vein. Post-procedure, the venous sheath was left intact and sutured in place.  Procedure Description Comments: After identifying the left internal jugular vein using ultrasound we proceeded with placing a microcatheter sheath confirmed under fluoroscopy the course of the wire in the RA we then upsized to 8 Amharic sheath was placed. We then floated a temporarily RV wire to the apex of the RV. The pacing rate and output was tested and the output was lost at 3 mV. The pacer was set at 70. Patient tolerated procedure very well.  Hemo Personnel: +--------------------+---------+ Name                Duty      +--------------------+---------+ Naman Maher MD 1 +--------------------+---------+  Complications: No in-lab complications observed.  Cardiac Cath Post Procedure Notes: Post Procedure Diagnosis: S/p TVP emergent placement. Blood Loss:               Estimated blood loss during the procedure was 10cc                           mls. Specimens Removed:        Number of specimen(s) removed: none. ____________________________________________________________________________________ CONCLUSIONS:  1. Indication: Complete heart block post TAVR.  2. S/p left IJ TVP placement, rate set at 70bpm. ICD 10 Codes: Atrioventricular block, complete-I44.2  CPT Codes:  Insertion of temporary single chamber cardiac electrode or pacemaker catheter (separate procedure)-16131  [id number] Naman Patterson DO Performing Physician Electronically signed by [id number] Patterson DO on 11/6/2024 at 9:21:06 PM  ** Final **     Transthoracic Echo (TTE) Limited    Result Date: 11/6/2024   [facility], [address] [place] [address]                Tel [phone] and Fax [phone] TRANSTHORACIC ECHOCARDIOGRAM REPORT  Patient Name:       JUDY SAGASTUME      Reading Physician:    [id number] Dev Muñoz MD Study Date:         11/6/2024           Ordering Provider:    [id number] NAMAN PATTERSON MRN/PID:            [id number]            Fellow: Accession#:         [id number]        Nurse: Date of Birth/Age:  1955 / 69      Sonographer:          Tanya guido RDCS Gender assigned at  F                   Additional Staff: Birth: Height:             162.56 cm           Admit Date:           10/28/2024 Weight:             123.38 kg           Admission Status:     Inpatient - STAT BSA / BMI:          2.23 m2 / 46.69     Encounter#:           [id number]                     kg/m2 Blood Pressure:     119/76 mmHg         Department Location:  [facility] Study Type:    TRANSTHORACIC ECHO (TTE) LIMITED Diagnosis/ICD: Nonrheumatic aortic (valve) stenosis-I35.0 Indication:    Check for effusion CPT Code:      Echo Limited-93482; Doppler Limited-15274; Color Doppler-98433 Patient History: Pertinent History: ESRD on IHD, CAD, s/p PCI, lymphedema, chronic hypoxemia, DM,                    HLD, HTN, HFmrEF, afib, AS s/p TAVR #29mm Evolut FX                    (11/5/24). Study Detail: The following Echo studies were performed: 2D, M-Mode, Doppler and               color flow. Technically challenging study  due to body habitus,               patient lying in supine position and prominent lung artifact. The               patient is intubated.  PHYSICIAN INTERPRETATION: Left Ventricle: Left ventricular ejection fraction is low normal, by visual estimate at 55%. The left ventricular cavity size is normal. Abnormal (paradoxical) septal motion, consistent with an intraventricular conduction delay. Left ventricular diastolic filling was not assessed. Left Atrium: The left atrium is enlarged. Right Ventricle: The right ventricle is mildly enlarged. There is low normal right ventricular systolic function. Right Atrium: The right atrium is upper limits of normal in size. Aortic Valve: There is a prosthetic aortic valve present. There is Evolut transcatheter aortic valve bioprosthesis. There is trace aortic valve regurgitation. The peak instantaneous gradient of the aortic valve is 29 mmHg. Mitral Valve: The mitral valve is mildly thickened. There is mild to moderate mitral annular calcification. There is trace mitral valve regurgitation. Tricuspid Valve: The tricuspid valve was not well visualized. There is trace tricuspid regurgitation. Pulmonic Valve: The pulmonic valve is structurally normal. There is trace pulmonic valve regurgitation. Pericardium: Trivial pericardial effusion. Aorta: The aortic root was not well visualized. There is no dilatation of the aortic root. In comparison to the previous echocardiogram(s): Compared with study dated 11/6/2024, no significant change.  CONCLUSIONS:  1. Poorly visualized anatomical structures due to suboptimal image quality.  2. Left ventricular ejection fraction is low normal, by visual estimate at 55%.  3. Mildly enlarged right ventricle.  4. There is low normal right ventricular systolic function.  5. There is Evolut transcatheter aortic valve bioprosthesis. RECOMMENDATIONS: Utilizing an FDA cleared automated machine learning algorithm (EchoGo Heart Failure by [employer]), the  analysis of the apical 4-chamber echocardiogram suggests the presence of heart failure with preserved ejection fraction (HFpEF)*. Clinical correlation looking for additional heart failure signs and symptoms is recommended, as a definite diagnosis of heart failure cannot be made by imaging alone. *Per ACC/AHA/HFSA universal diagnosis of heart failure, HFpEF is defined as 1) signs and symptoms leading to clinical diagnosis of heart failure, 2) an ejection fraction of at least 50%, and 3) evidence of elevated intra-cardiac filling pressures by echocardiography, BNP elevation, or catheterization.  QUANTITATIVE DATA SUMMARY:  AORTA MEASUREMENTS:         Normal Ranges: Ao Sinus, d:        2.80 cm (2.1-3.5cm)  LV SYSTOLIC FUNCTION BY 2D PLANIMETRY (MOD):                      Normal Ranges: EF-Visual:      55 % LV EF Reported: 55 %  AORTIC VALVE:            Normal Ranges: AoV Vmax:      2.70 m/s  (<=1.7m/s) AoV Peak P.2 mmHg (<20mmHg) LVOT Max Elian:  1.75 m/s  (<=1.1m/s) LVOT VTI:      19.50 cm LVOT Diameter: 1.70 cm   (1.8-2.4cm) AoV Area,Vmax: 1.47 cm2  (2.5-4.5cm2)  [id number] Dev Muñoz MD Electronically signed on 2024 at 4:58:00 PM  ** Final **     XR chest 1 view    Result Date: 2024  Interpreted By:  Tereso Hampton and Omar Mahmoud STUDY: XR CHEST 1 VIEW;  2024 3:20 pm   INDICATION: Signs/Symptoms:placement of tvp.     COMPARISON: Chest x-ray 2022 11 p.m.   ACCESSION NUMBER(S): [id number]   ORDERING CLINICIAN: THUY PATTERSON   FINDINGS: AP radiograph of the chest was provided.   Interval retraction of the left IJ approach cardiac pacing were with tip now projecting within the expected location of the right ventricle. Status post TAVR.   CARDIOMEDIASTINAL SILHOUETTE: Cardiomediastinal silhouette is stable in size and configuration.   LUNGS: The lungs are hypoexpanded with associated bronchovascular crowding. There is stable diffuse interstitial prominence. Stable right subsegmental basilar  atelectasis. There is no new focal consolidation. There is no pneumothorax. The costophrenic angles clear. The left costophrenic is again obscured by the cardiomediastinal silhouette.   ABDOMEN: No remarkable upper abdominal findings.   BONES: No acute osseous changes.       1. Interval retraction of left IJ approach cardiac pacing wire with tip now projecting over the right ventricle. 2. Stable pulmonary interstitial edema. 3. Stable right basilar subsegmental atelectasis.   I personally reviewed the images/study and I agree with the findings as stated by Bekah Slainas MD (PGY-2). This study was interpreted at [place], [place].   MACRO: None   Signed by: Tereso Hampton 11/6/2024 4:08 PM Dictation workstation:   [id number]    XR chest 1 view    Result Date: 11/6/2024  Interpreted By:  Tereso Hampton and Omar Mahmoud STUDY: XR CHEST 1 VIEW;  11/6/2024 2:57 pm   INDICATION: Signs/Symptoms:tvp.     COMPARISON: Chest x-ray 11/06/2024 9:06 a.m.   ACCESSION NUMBER(S): [id number]   ORDERING CLINICIAN: THUY PATTERSON   FINDINGS: AP radiograph of the chest was provided.   Interval removal of an inferior approach cardiac pacing wire. Interval placement of a left internal jugular venous approach cardiac pacing wire with tip projecting over the expected location of the distal main pulmonary artery. Status post TAVR.   CARDIOMEDIASTINAL SILHOUETTE: Cardiomediastinal silhouette is stable in size and configuration.   LUNGS: The lungs are hypoexpanded with associated bronchovascular crowding. Diffuse interstitial prominence, stable as compared to prior. Stable right subsegmental atelectasis. There is no new focal consolidation. There is no pneumothorax. The left costophrenic angle is again partially obscured by the cardiomediastinal silhouette. The right costophrenic angle is clear.   ABDOMEN: No remarkable upper abdominal findings.   BONES: No acute osseous changes.       1. Interval  placement of a left IJ approach cardiac pacer with tip projecting over the distal main pulmonary artery. 2. Stable mild pulmonary interstitial edema. 3. Stable right subsegmental atelectasis.   I personally reviewed the images/study and I agree with the findings as stated by Bekah Salinas MD (PGY-2). This study was interpreted at [facility], [place], [place].   MACRO: None   Signed by: Tereso Hampton 11/6/2024 4:07 PM Dictation workstation:   [id number]       Assessment & Plan  Rhinovirus    S/P TAVR (transcatheter aortic valve replacement)    Complete heart block    Cardiac arrest    ESRD (end stage renal disease) (Multi)    Hematoma    Acute systolic HF (heart failure)    A-fib (Multi)    Aspiration pneumonia (Multi)    Coronary artery disease    Acute hypoxic respiratory failure (Multi)    Thrombocytopenia (CMS-HCC)    Hyperkalemia    Anemia of chronic disease    Cellulitis    Type 2 diabetes mellitus        Assessment/Plan:  Faviola Pleitez is a 69 y.o. female with PMHx of diabetic ESRD on T/Th/S HD with left arm fistula, CAD, s/p PCI to LAD in 2015, then s/p PCI to LM and mid and proximal LAD with double stenting 11/01/2024, lymphedema c/b recurrent cellulitis of LE, AoCD, CHRF of unclear etiology (on 3L NC baseline), DM2, HLD, HTN, recently diagnosed HFmrEF at 31% and severe AS, and afib, on Eliquis. She is now s/p successful TAVR with Evolut FX+ 29 mm 11/5. Transferred to CICU for post-procedural management. Pt with improved AV conduction on 11/6 with no requirement of TVP pacing for last 24 hours after TAVR procedure. TVP removed 11/6 without issue, however shortly thereafter pt developed afib w/ complete heart block and no ventricular escape rhythm leading to cardiac arrest. Pt coded three times in the span of about 45 minutes (approx 3-5 min per code), ultimately requiring transcutaneous pacing after ROSC achieved and then emergent TVP placement in suite with EP. Pt returned  from TVP and within 30 min lost capture & had PEA arrest for about 5 minutes with ROSC achieved. Pt with worsening mental status and c/f aspiration therefore intubated. 11/6 evening pt noted to have enlarging L groin w/ c/f hematoma. LLE ultrasound ultimately confirmed L fem pseudoaneurysm w/ hematoma. 11/7 don ppm placed with trialysis placed LIJ and started on CVVH. 11/7 overnight pt hypothermic, started on bear hugger, warmed CVVH fluids, and heat packs. Shock still undifferentiated, likely mixed septic & cardiogenic shock (no blood cx obtained prior to start of abx & unable to obtain accurate mixed venous given presence of fistula).     Updates 11/08/24:  Levo 0.08, vaso 0.03, fent/midaz  VCAC 30%/430/14/10  [] SAT/SBT with no plans for extubation today given continued 2 pressor requirement  [] Stress dosed steroid solucortef 50mg q6h   [] Consider starting tube feeds tomorrow pending potential extubation  [] Abx: plan for 7-10 day course    [] Karen 11/7 - * (ordered 11/6 but didn't come up until 11/7)   [] Vanco 11/5 - *  [] Run CVVH net even  [] C/t wean pressors, goal MAP >65  [] CTM thrombocytopenia: plt 36 however not bleeding therefore no transfusion at this time  [] F/u PF4  [] CBC BID, ABG full panel q4h, T&S q72hr  [] Restart dvt ppx when able (ultimately will need to be on full AC for Afib)  [] Address pseudoaneurysm tomorrow  [] Meds holding: amiodarone, phoslo    NEURO:  #Pain control  # Intubated & sedated  - PRN Tylenol  - continue fentanyl gtt for sedation and pain control     CARDS:  #Severe AS, s/p successful TAVR with Evolut FX+ 29 mm 11/5  #Complete heart block  TTE 10/29/24: EF 31%, GHK, reduced RV systolic function, LA mildly dilated, mod mitral annular calcification, mod elevated RVSP, Severe calcific AS with gradients of 64/38.4mmHg and DI of 0.19 and mild AI. ( Note the gradients and VTI were averaged over 5 consecutive beats given atrial fibrillation ) consistent with severe aortic  stenosis, severe AV cusp calc, mild AR. S/p successful TAVR with Evolut FX+ 29 mm 11/5. Left CFV TVP placed as well for transient heart block. Pre LVEDP: 20 mmHg. Pst LVEDP:  22 mmHg. Post gradient TTE: 5 mmHg.  - Repeat TTE 11/6 - LV EF low normal at 55%, mildly enlarged RV, low normal RVSF, Evolut transcatheter aortic valve bioprosthesis  - 11/7: Micra PPM placed with success, will monitor. Went from right groin. Placed trialysis line for CVVH   - Did not use Heparin given concern for HIT - used bivalirudin     #New HFmrEF, 31%  #Acute systolic and diastolic heart failure, decompensated  #CAD s/p PCI, 11/1/24  TTE report as above - global hypokinesis. BNP 1284 on admission. [facility] 11/1: Culprit vessel(s): left anterior descending. OCT guided successful PCI to LM and mid and proximal LAD with double stenting. Successful DCB for in stent restenosis of LAD stents. Lcx 50% ostial stenosis.  - New HF workup ordered 11/5              > Likely ICM  - Volume management with HD - nephrology managing  - GDMT limited due to ESRD on HD  - DAPT with ASA and stain  - Off AC given high risk of bleed with pseudoaneurysm and low platelets and evaluating for HIT  - Atorva 40 mg daily     #Newly diagnosed afib  10/25/24 at OSH.  - Holding anticoagulation given pseudoaneurysm, low platelets, risk of HIT  - Amiodarone 200mg daily    #CARDIAC ARREST - 11/6  TVP was removed around Noon for patient to be stepped down. After pulling the TVP, patient was okay but after 1 min she went into asystole. Compressed patient for 6-8 minutes and gave one dose of epinephrine. Was transcutaneously paced, and emergently taken to cath lab for replacement of TVP.  After placement of recurrent TVP patient lost capture after coming to the CICU and gave 2 compressions. She also became hypotensive and then dropped her pressures for which levophed and dopamine were started. She was intubated as she was vomiting fluids. Arterial line was placed and thereafter  "patient started to develop a hematoma in the groin for which pressure was applied. Monitoring for hematoma expansion.  - Continue levophed, dopamine, and vasopressin drips to titrate MAP>65  - Continue Fentanyl and versed drips  - Family updated and discussed care with them     PULM  #CHRF, unclear etiology, possible COPD?  #Rhinovirus positive at OSH, resolved  #Pulmonary nodules, incidental finding  CT TAVR: Multiple solid non-calcified pulmonary nodules measuring up to 7 mm. In absence of prior comparative examinations, to ensure stability, consider follow up non contrast chest CT at 3-6 months. Completed prednisone taper per OSH recs during this [facility] admission.  - currently intubated - AC/VC 30%/430/14/10  - Breo-Ellipta daily     NEPHRO:  #Hyperkalemia  #Diabetic ESRD on HD T, Th, Sat via left arm fistula  S/p HD 10/29, 10/31, 11/2, 11/4.  -discontinued Midodrine 5 mg TID  -on pressor support  -c/w CVVH     HEME:  # Thrombocytopenia  - Plt 125 on admission, downtrended since admit  - Ddx KARLEY vs 2/2 critical illness  [] F/u PF4  [] Plt >10 if not bleeding, >50 if bleeding >100 if CNS bleeding  [] Maintain active T&S    #Anemia of chronic disease  Hemoglobin 10.7 to 9.7 s/p TAVR  - CTM     ID:  #Acute Cellulitis  #Poor nail integrity  #Hx lymphedema  Appears to have completed a course of Levaquin through 10/30 (initiated at OSH), and also \"recently completed metronidazole course,\" again, at OSH. No culture data. Ongoing cellulitis on exam 11/5, with bullae.  - Vancomycin started 11/6  - Wound care following - recommendations of daily cleaning at this time  - Consider podiatry consult    #Concern for Sepsis  - empiric abx coverage - vancomycin and meropenam  [] f/u blood culture drawn 11/7    ENDO:  #DM2  A1c 7 in August. Repeat 11/5 of 7.3  [] SSI q4h #2     DVT prophylaxis: SCDs     Access: R rad art line 11/7, LIJ trialysis 11/7  Gtt: levo 0.08, vaso 0.03, midazolam, fent  Tubes/drains: Nathan, OG  O2: VCAC " 30%/430/14/10  Diet: NPO  GI ppx: PPI  DVT ppx: SCDs, holding chemical ppx 2/2 plt 30s    Code Status: Full Code (confirmed on admission)   NOK: Extended Emergency Contact Information  Primary Emergency Contact: Susie Sagastume - primary decision maker  Mobile Phone: [phone]  Relation: Daughter  Secondary Emergency Contact: SINDHU SAGASTUME - give updates to  Mobile Phone: [phone]  Relation: Valentino El MD  Internal Medicine PGY-3

## 2024-11-08 NOTE — CARE PLAN
The patient's goals for the shift include      The clinical goals for the shift include pt stays HDS throughot shift    Over the shift, the patient did not make progress toward the following goals. Barriers to progression include . Recommendations to address these barriers include .    Problem: Respiratory  Goal: Clear secretions with interventions this shift  11/8/2024 1159 by Myesha Cardona RN  Outcome: Not Progressing  11/8/2024 1157 by Myesha Cardona RN  Outcome: Not Progressing  11/8/2024 1156 by Myesha Cardona RN  Outcome: Not Progressing  11/8/2024 1155 by Myesha Cardona RN  Outcome: Not Progressing     Problem: Pain  Goal: Takes deep breaths with improved pain control throughout the shift  11/8/2024 1159 by Myesha Cardona RN  Outcome: Not Progressing  11/8/2024 1157 by Myesha Cardona RN  Outcome: Not Progressing  11/8/2024 1155 by Myesha Cardona RN  Outcome: Progressing  Goal: Turns in bed with improved pain control throughout the shift  11/8/2024 1159 by Myesha Cardona RN  Outcome: Not Progressing  11/8/2024 1157 by Myesha Cardona RN  Outcome: Not Progressing  11/8/2024 1155 by Myesha Cardona RN  Outcome: Progressing  Goal: Walks with improved pain control throughout the shift  11/8/2024 1159 by Myesha Cardona RN  Outcome: Not Progressing  11/8/2024 1157 by Myesha Cardona RN  Outcome: Not Progressing  11/8/2024 1155 by Myesha Cardona RN  Outcome: Progressing

## 2024-11-08 NOTE — CONSULTS
"Nutrition Initial Assessment:   Nutrition Assessment    Reason for Assessment: Dietitian discretion (intubated)    Patient is a 69 y.o. female presenting  with severe aortic stenosis. She is s/p successful TAVR on 11/5/24. Pt suffered a cardiac arrest on 11/6. She was intubated and placed on pressors. Now on CVVH.     Nutrition History:  Food and Nutrient History: Met with pt's daughter at bedside. Per daughter, pt eats fine at home. Has a good appetite. No recent weight loss. She was recently diagnosed with celiac ~ 2 years ago however does not follow a gluten free diet. She limits dairy products d/t renal function, but does not have any issues with intolerance. Pt has been without nutrirition for ~ 3 days. She had active regular diet order in place this morning even though she is intubated. Did change to NPO. OGT in place.  Food Allergies/Intolerances:  None  GI Symptoms: None  Oral Problems: None     Anthropometrics:  Height: 162.6 cm (5' 4\")   Weight: 127 kg (279 lb 15.8 oz)   BMI (Calculated): 48.04  IBW/kg (Dietitian Calculated): 54.5 kg  Percent of IBW: 233 %     Weight History:   Wt Readings from Last 10 Encounters:   11/08/24 127 kg (279 lb 15.8 oz)   10/27/24 119 kg (262 lb 5.6 oz)     Weight Change %:  Weight History / % Weight Change: Pt's daughter does not thinks he has had any recent weight loss. Unsure of UBW    Nutrition Focused Physical Exam Findings:  Subcutaneous Fat Loss:   Orbital Fat Pads: Well nourished (slightly bulging fat pads)  Buccal Fat Pads: Well nourished (full, rounded cheeks)  Muscle Wasting:  Temporalis: Well nourished (well-defined muscle)  Pectoralis (Clavicular Region): Well nourished (clavicle not visible)  Deltoid/Trapezius: Well nourished (rounded appearance at arm, shoulder, neck)  Edema:  Edema: +3 moderate, +2 mild  Edema Location: BLE; generalized  Physical Findings:  Skin: Negative    Nutrition Significant Labs:  CBC Trend:   Results from last 7 days   Lab Units " "11/08/24 0410 11/07/24 2209 11/07/24  1630 11/07/24  1158   WBC AUTO x10*3/uL 12.6* 15.8* 14.2* 16.5*   RBC AUTO x10*6/uL 2.67* 2.72* 2.97* 2.91*   HEMOGLOBIN g/dL 8.6* 8.7* 9.6* 9.3*   HEMATOCRIT % 26.1* 26.9* 29.2* 28.7*   MCV fL 98 99 98 99   PLATELETS AUTO x10*3/uL 36* 42* 32* 40*    , BMP Trend:   Results from last 7 days   Lab Units 11/08/24 1229 11/08/24 0410 11/07/24 2209 11/07/24  1630   GLUCOSE mg/dL 204* 205* 229* 232*  232*   CALCIUM mg/dL 9.1 8.9 8.5* 8.0*   SODIUM mmol/L 135* 134* 132* 134*   POTASSIUM mmol/L 4.5 4.5 5.3 6.1*   CO2 mmol/L 23 24 23 22   CHLORIDE mmol/L 100 98 97* 97*   BUN mg/dL 28* 34* 43* 50*   CREATININE mg/dL 3.15* 3.92* 5.26* 6.16*    , Renal Lab Trend:   Results from last 7 days   Lab Units 11/08/24 1229 11/08/24 0410 11/07/24 2209 11/07/24  1630   POTASSIUM mmol/L 4.5 4.5 5.3 6.1*   PHOSPHORUS mg/dL 4.1 4.0 4.5 4.4   SODIUM mmol/L 135* 134* 132* 134*   MAGNESIUM mg/dL  --  1.86  --  1.94   EGFR mL/min/1.73m*2 16* 12* 8* 7*   BUN mg/dL 28* 34* 43* 50*   CREATININE mg/dL 3.15* 3.92* 5.26* 6.16*    , Vit D: No results found for: \"VITD25\"     Nutrition Specific Medications:  Scheduled medications  [Held by provider] amiodarone, 200 mg, oral, Daily  [Held by provider] apixaban, 5 mg, oral, BID  aspirin, 81 mg, oral, Daily  atorvastatin, 40 mg, oral, q AM  clopidogrel, 75 mg, oral, Daily  pantoprazole, 40 mg, oral, Daily before breakfast   Or  esomeprazole, 40 mg, nasoduodenal tube, Daily before breakfast   Or  pantoprazole, 40 mg, intravenous, Daily before breakfast  fluticasone furoate-vilanteroL, 1 puff, inhalation, Daily  insulin lispro, 0-10 Units, subcutaneous, q4h  [Held by provider] melatonin, 6 mg, oral, Nightly  meropenem, 1,000 mg, intravenous, q12h  oxygen, , inhalation, Continuous - Inhalation  perflutren lipid microspheres, 0.5-10 mL of dilution, intravenous, Once in imaging  polyethylene glycol, 17 g, oral, Daily  sennosides-docusate sodium, 1 tablet, oral, " Nightly  sodium chloride, 1,000 mL, CRRT, Once  vitamin B complex-vitamin C-folic acid, 1 capsule, oral, Daily      Continuous medications  fentaNYL,  mcg/hr, Last Rate: 50 mcg/hr (11/08/24 0400)  midazolam, 0-20 mg/hr, Last Rate: 1 mg/hr (11/08/24 0400)  norepinephrine, 0-0.2 mcg/kg/min, Last Rate: 0.18 mcg/kg/min (11/08/24 1400)  PrismaSol BGK 2/3.5, 25 mL/kg/hr, Last Rate: 25 mL/kg/hr (11/08/24 1347)  vasopressin, 0.03 Units/min, Last Rate: 0.5 Units/min (11/08/24 0946)      PRN medications  PRN medications: acetaminophen **OR** acetaminophen **OR** acetaminophen, bisacodyl, dextrose, glucagon, glucagon, glucagon HCL, melatonin, midazolam, midazolam, ondansetron, polyethylene glycol, sodium chloride, vancomycin      I/O:   Last BM Date: 11/05/24; Stool Appearance: Formed (11/04/24 2034)    Dietary Orders (From admission, onward)       Start     Ordered    11/08/24 1434  NPO Diet; Effective now  Diet effective now         11/08/24 1433    10/28/24 1131  May Participate in Room Service  ( ROOM SERVICE MAY PARTICIPATE)  Once        Question:  .  Answer:  Yes    10/28/24 1130                     Estimated Needs:   Total Energy Estimated Needs (kCal):  (3293-9941)  Method for Estimating Needs: 11-14kcal/kg x 119kg  Total Protein Estimated Needs (g): 109 g  Method for Estimating Needs: ~2.0g/kg IBW  Total Fluid Estimated Needs (mL):  (per MD/team discretion)           Nutrition Diagnosis   Malnutrition Diagnosis  Patient has Malnutrition Diagnosis: No    Nutrition Diagnosis  Patient has Nutrition Diagnosis: Yes  Diagnosis Status (1): New  Nutrition Diagnosis 1: Increased nutrient needs (protein)  Related to (1): increased metabolic demand  As Evidenced by (1): critical illness, CVVH       Nutrition Interventions/Recommendations         Nutrition Prescription:  Individualized Nutrition Prescription Provided for : enteral nutrition        Nutrition Interventions:   Interventions: Enteral intake  Start trickle  feeds of Pivot 1.5 @ 10ml/hr when able   Once medically feasible pending pressor requirements recommend goal of Pivot 1.5 @ 45ml/hr   Increase by [id number] until goal is met   FWF per MD/team     Pivot @ 45ml/hr = 1620kcal, 102gm protein, and 810ml free water    Collaboration and Referral of Nutrition Care: Team meeting involving nutrition professional  Goal: Mentioned starting trickle feeds in IDT rounds      Nutrition Monitoring and Evaluation   Food/Nutrient Related History Monitoring  Monitoring and Evaluation Plan: Enteral and parenteral nutrition intake  Enteral and Parenteral Nutrition Intake: Enteral nutrition intake  Criteria: Tolerate TF @ goal    Body Composition/Growth/Weight History  Monitoring and Evaluation Plan: Weight  Criteria: minimize loss    Biochemical Data, Medical Tests and Procedures  Monitoring and Evaluation Plan: Electrolyte/renal panel, Glucose/endocrine profile  Criteria: WNL              Time Spent (min): 60 minutes

## 2024-11-09 PROBLEM — R57.9 SHOCK (MULTI): Status: ACTIVE | Noted: 2024-01-01

## 2024-11-09 PROBLEM — E87.20 LACTIC ACIDOSIS: Status: ACTIVE | Noted: 2024-01-01

## 2024-11-09 PROBLEM — A41.9 SEPSIS (MULTI): Status: ACTIVE | Noted: 2024-01-01

## 2024-11-09 LAB
ABO GROUP (TYPE) IN BLOOD: NORMAL
ACANTHOCYTES BLD QL SMEAR: ABNORMAL
ALBUMIN SERPL BCP-MCNC: 2 G/DL (ref 3.4–5)
ALBUMIN SERPL BCP-MCNC: 2.1 G/DL (ref 3.4–5)
ALBUMIN SERPL BCP-MCNC: 2.2 G/DL (ref 3.4–5)
ALBUMIN SERPL BCP-MCNC: 2.6 G/DL (ref 3.4–5)
ALP SERPL-CCNC: 74 U/L (ref 33–136)
ALP SERPL-CCNC: 89 U/L (ref 33–136)
ALT SERPL W P-5'-P-CCNC: 564 U/L (ref 7–45)
ALT SERPL W P-5'-P-CCNC: 687 U/L (ref 7–45)
ANION GAP BLDA CALCULATED.4IONS-SCNC: 14 MMO/L (ref 10–25)
ANION GAP BLDA CALCULATED.4IONS-SCNC: 16 MMO/L (ref 10–25)
ANION GAP BLDA CALCULATED.4IONS-SCNC: 17 MMO/L (ref 10–25)
ANION GAP BLDA CALCULATED.4IONS-SCNC: 18 MMO/L (ref 10–25)
ANION GAP BLDA CALCULATED.4IONS-SCNC: 19 MMO/L (ref 10–25)
ANION GAP BLDA CALCULATED.4IONS-SCNC: 20 MMO/L (ref 10–25)
ANION GAP BLDA CALCULATED.4IONS-SCNC: 20 MMO/L (ref 10–25)
ANION GAP BLDA CALCULATED.4IONS-SCNC: 21 MMO/L (ref 10–25)
ANION GAP BLDA CALCULATED.4IONS-SCNC: 21 MMO/L (ref 10–25)
ANION GAP BLDA CALCULATED.4IONS-SCNC: 23 MMO/L (ref 10–25)
ANION GAP BLDA CALCULATED.4IONS-SCNC: 25 MMO/L (ref 10–25)
ANION GAP BLDMV CALCULATED.4IONS-SCNC: 18 MMO/L (ref 10–25)
ANION GAP SERPL CALC-SCNC: 20 MMOL/L (ref 10–20)
ANION GAP SERPL CALC-SCNC: 21 MMOL/L (ref 10–20)
ANION GAP SERPL CALC-SCNC: 21 MMOL/L (ref 10–20)
ANION GAP SERPL CALC-SCNC: 25 MMOL/L (ref 10–20)
ANTIBODY SCREEN: NORMAL
AORTIC VALVE MEAN GRADIENT: 19 MMHG
AORTIC VALVE PEAK VELOCITY: 2.97 M/S
APTT PPP: 33 SECONDS (ref 27–38)
AST SERPL W P-5'-P-CCNC: 491 U/L (ref 9–39)
AST SERPL W P-5'-P-CCNC: 714 U/L (ref 9–39)
AV PEAK GRADIENT: 35 MMHG
AVA (PEAK VEL): 1.39 CM2
AVA (VTI): 1.23 CM2
BASE EXCESS BLDA CALC-SCNC: -11.6 MMOL/L (ref -2–3)
BASE EXCESS BLDA CALC-SCNC: -12.1 MMOL/L (ref -2–3)
BASE EXCESS BLDA CALC-SCNC: -12.8 MMOL/L (ref -2–3)
BASE EXCESS BLDA CALC-SCNC: -15.7 MMOL/L (ref -2–3)
BASE EXCESS BLDA CALC-SCNC: -5.1 MMOL/L (ref -2–3)
BASE EXCESS BLDA CALC-SCNC: -6.4 MMOL/L (ref -2–3)
BASE EXCESS BLDA CALC-SCNC: -6.8 MMOL/L (ref -2–3)
BASE EXCESS BLDA CALC-SCNC: -8.9 MMOL/L (ref -2–3)
BASE EXCESS BLDA CALC-SCNC: -9.2 MMOL/L (ref -2–3)
BASE EXCESS BLDA CALC-SCNC: -9.4 MMOL/L (ref -2–3)
BASE EXCESS BLDA CALC-SCNC: -9.8 MMOL/L (ref -2–3)
BASE EXCESS BLDMV CALC-SCNC: -8.2 MMOL/L (ref -2–3)
BASO STIPL BLD QL SMEAR: PRESENT
BASOPHILS # BLD AUTO: 0.02 X10*3/UL (ref 0–0.1)
BASOPHILS # BLD AUTO: 0.03 X10*3/UL (ref 0–0.1)
BASOPHILS # BLD MANUAL: 0 X10*3/UL (ref 0–0.1)
BASOPHILS NFR BLD AUTO: 0.1 %
BASOPHILS NFR BLD AUTO: 0.1 %
BASOPHILS NFR BLD MANUAL: 0 %
BILIRUB DIRECT SERPL-MCNC: 1.4 MG/DL (ref 0–0.3)
BILIRUB SERPL-MCNC: 2.8 MG/DL (ref 0–1.2)
BILIRUB SERPL-MCNC: 2.8 MG/DL (ref 0–1.2)
BODY TEMPERATURE: 37 DEGREES CELSIUS
BUN SERPL-MCNC: 15 MG/DL (ref 6–23)
BUN SERPL-MCNC: 17 MG/DL (ref 6–23)
BUN SERPL-MCNC: 21 MG/DL (ref 6–23)
BUN SERPL-MCNC: 22 MG/DL (ref 6–23)
CA-I BLDA-SCNC: 1.11 MMOL/L (ref 1.1–1.33)
CA-I BLDA-SCNC: 1.11 MMOL/L (ref 1.1–1.33)
CA-I BLDA-SCNC: 1.13 MMOL/L (ref 1.1–1.33)
CA-I BLDA-SCNC: 1.16 MMOL/L (ref 1.1–1.33)
CA-I BLDA-SCNC: 1.16 MMOL/L (ref 1.1–1.33)
CA-I BLDA-SCNC: 1.19 MMOL/L (ref 1.1–1.33)
CA-I BLDA-SCNC: 1.19 MMOL/L (ref 1.1–1.33)
CA-I BLDA-SCNC: 1.2 MMOL/L (ref 1.1–1.33)
CA-I BLDA-SCNC: 1.22 MMOL/L (ref 1.1–1.33)
CA-I BLDA-SCNC: 1.23 MMOL/L (ref 1.1–1.33)
CA-I BLDA-SCNC: 1.23 MMOL/L (ref 1.1–1.33)
CA-I BLDMV-SCNC: 1.16 MMOL/L (ref 1.1–1.33)
CALCIUM SERPL-MCNC: 7.6 MG/DL (ref 8.6–10.6)
CALCIUM SERPL-MCNC: 7.7 MG/DL (ref 8.6–10.6)
CALCIUM SERPL-MCNC: 7.9 MG/DL (ref 8.6–10.6)
CALCIUM SERPL-MCNC: 8.6 MG/DL (ref 8.6–10.6)
CHLORIDE BLD-SCNC: 99 MMOL/L (ref 98–107)
CHLORIDE BLDA-SCNC: 100 MMOL/L (ref 98–107)
CHLORIDE BLDA-SCNC: 101 MMOL/L (ref 98–107)
CHLORIDE BLDA-SCNC: 102 MMOL/L (ref 98–107)
CHLORIDE BLDA-SCNC: 97 MMOL/L (ref 98–107)
CHLORIDE BLDA-SCNC: 98 MMOL/L (ref 98–107)
CHLORIDE BLDA-SCNC: 99 MMOL/L (ref 98–107)
CHLORIDE SERPL-SCNC: 100 MMOL/L (ref 98–107)
CHLORIDE SERPL-SCNC: 102 MMOL/L (ref 98–107)
CHLORIDE SERPL-SCNC: 98 MMOL/L (ref 98–107)
CHLORIDE SERPL-SCNC: 99 MMOL/L (ref 98–107)
CO2 SERPL-SCNC: 15 MMOL/L (ref 21–32)
CO2 SERPL-SCNC: 19 MMOL/L (ref 21–32)
CREAT SERPL-MCNC: 1.68 MG/DL (ref 0.5–1.05)
CREAT SERPL-MCNC: 1.94 MG/DL (ref 0.5–1.05)
CREAT SERPL-MCNC: 2.53 MG/DL (ref 0.5–1.05)
CREAT SERPL-MCNC: 2.77 MG/DL (ref 0.5–1.05)
EGFRCR SERPLBLD CKD-EPI 2021: 18 ML/MIN/1.73M*2
EGFRCR SERPLBLD CKD-EPI 2021: 20 ML/MIN/1.73M*2
EGFRCR SERPLBLD CKD-EPI 2021: 28 ML/MIN/1.73M*2
EGFRCR SERPLBLD CKD-EPI 2021: 33 ML/MIN/1.73M*2
EJECTION FRACTION APICAL 4 CHAMBER: 37.6
EJECTION FRACTION: 53 %
EOSINOPHIL # BLD AUTO: 0.01 X10*3/UL (ref 0–0.7)
EOSINOPHIL # BLD AUTO: 0.01 X10*3/UL (ref 0–0.7)
EOSINOPHIL # BLD MANUAL: 0 X10*3/UL (ref 0–0.7)
EOSINOPHIL NFR BLD AUTO: 0 %
EOSINOPHIL NFR BLD AUTO: 0 %
EOSINOPHIL NFR BLD MANUAL: 0 %
ERYTHROCYTE [DISTWIDTH] IN BLOOD BY AUTOMATED COUNT: 19.4 % (ref 11.5–14.5)
ERYTHROCYTE [DISTWIDTH] IN BLOOD BY AUTOMATED COUNT: 19.9 % (ref 11.5–14.5)
ERYTHROCYTE [DISTWIDTH] IN BLOOD BY AUTOMATED COUNT: 20.4 % (ref 11.5–14.5)
GLUCOSE BLD MANUAL STRIP-MCNC: 147 MG/DL (ref 74–99)
GLUCOSE BLD MANUAL STRIP-MCNC: 156 MG/DL (ref 74–99)
GLUCOSE BLD MANUAL STRIP-MCNC: 170 MG/DL (ref 74–99)
GLUCOSE BLD MANUAL STRIP-MCNC: 175 MG/DL (ref 74–99)
GLUCOSE BLD MANUAL STRIP-MCNC: 228 MG/DL (ref 74–99)
GLUCOSE BLD MANUAL STRIP-MCNC: <10 MG/DL (ref 74–99)
GLUCOSE BLD-MCNC: 199 MG/DL (ref 74–99)
GLUCOSE BLDA-MCNC: 163 MG/DL (ref 74–99)
GLUCOSE BLDA-MCNC: 163 MG/DL (ref 74–99)
GLUCOSE BLDA-MCNC: 172 MG/DL (ref 74–99)
GLUCOSE BLDA-MCNC: 177 MG/DL (ref 74–99)
GLUCOSE BLDA-MCNC: 180 MG/DL (ref 74–99)
GLUCOSE BLDA-MCNC: 191 MG/DL (ref 74–99)
GLUCOSE BLDA-MCNC: 198 MG/DL (ref 74–99)
GLUCOSE BLDA-MCNC: 198 MG/DL (ref 74–99)
GLUCOSE BLDA-MCNC: 200 MG/DL (ref 74–99)
GLUCOSE BLDA-MCNC: 204 MG/DL (ref 74–99)
GLUCOSE BLDA-MCNC: 223 MG/DL (ref 74–99)
GLUCOSE SERPL-MCNC: 155 MG/DL (ref 74–99)
GLUCOSE SERPL-MCNC: 173 MG/DL (ref 74–99)
GLUCOSE SERPL-MCNC: 178 MG/DL (ref 74–99)
GLUCOSE SERPL-MCNC: 180 MG/DL (ref 74–99)
HCO3 BLDA-SCNC: 10.9 MMOL/L (ref 22–26)
HCO3 BLDA-SCNC: 13.2 MMOL/L (ref 22–26)
HCO3 BLDA-SCNC: 14 MMOL/L (ref 22–26)
HCO3 BLDA-SCNC: 14.2 MMOL/L (ref 22–26)
HCO3 BLDA-SCNC: 15.9 MMOL/L (ref 22–26)
HCO3 BLDA-SCNC: 16 MMOL/L (ref 22–26)
HCO3 BLDA-SCNC: 16.2 MMOL/L (ref 22–26)
HCO3 BLDA-SCNC: 16.7 MMOL/L (ref 22–26)
HCO3 BLDA-SCNC: 18.3 MMOL/L (ref 22–26)
HCO3 BLDA-SCNC: 19.1 MMOL/L (ref 22–26)
HCO3 BLDA-SCNC: 19.7 MMOL/L (ref 22–26)
HCO3 BLDMV-SCNC: 17.9 MMOL/L (ref 22–26)
HCT VFR BLD AUTO: 20.4 % (ref 36–46)
HCT VFR BLD AUTO: 21.2 % (ref 36–46)
HCT VFR BLD AUTO: 22.9 % (ref 36–46)
HCT VFR BLD EST: 19 % (ref 36–46)
HCT VFR BLD EST: 22 % (ref 36–46)
HCT VFR BLD EST: 23 % (ref 36–46)
HCT VFR BLD EST: 24 % (ref 36–46)
HGB BLD-MCNC: 6.5 G/DL (ref 12–16)
HGB BLD-MCNC: 6.9 G/DL (ref 12–16)
HGB BLD-MCNC: 7.3 G/DL (ref 12–16)
HGB BLDA-MCNC: 6.4 G/DL (ref 12–16)
HGB BLDA-MCNC: 7.2 G/DL (ref 12–16)
HGB BLDA-MCNC: 7.3 G/DL (ref 12–16)
HGB BLDA-MCNC: 7.5 G/DL (ref 12–16)
HGB BLDA-MCNC: 7.6 G/DL (ref 12–16)
HGB BLDA-MCNC: 7.8 G/DL (ref 12–16)
HGB BLDA-MCNC: 8.1 G/DL (ref 12–16)
HGB BLDMV-MCNC: 7.6 G/DL (ref 12–16)
IMM GRANULOCYTES # BLD AUTO: 0.4 X10*3/UL (ref 0–0.7)
IMM GRANULOCYTES # BLD AUTO: 0.43 X10*3/UL (ref 0–0.7)
IMM GRANULOCYTES # BLD AUTO: 0.44 X10*3/UL (ref 0–0.7)
IMM GRANULOCYTES NFR BLD AUTO: 1.8 % (ref 0–0.9)
IMM GRANULOCYTES NFR BLD AUTO: 1.8 % (ref 0–0.9)
IMM GRANULOCYTES NFR BLD AUTO: 2 % (ref 0–0.9)
INHALED O2 CONCENTRATION: 30 %
INR PPP: 2.3 (ref 0.9–1.1)
LACTATE BLDA-SCNC: 10 MMOL/L (ref 0.4–2)
LACTATE BLDA-SCNC: 10.2 MMOL/L (ref 0.4–2)
LACTATE BLDA-SCNC: 11.3 MMOL/L (ref 0.4–2)
LACTATE BLDA-SCNC: 11.5 MMOL/L (ref 0.4–2)
LACTATE BLDA-SCNC: 11.5 MMOL/L (ref 0.4–2)
LACTATE BLDA-SCNC: 12.6 MMOL/L (ref 0.4–2)
LACTATE BLDA-SCNC: 8.4 MMOL/L (ref 0.4–2)
LACTATE BLDA-SCNC: 8.8 MMOL/L (ref 0.4–2)
LACTATE BLDA-SCNC: 8.8 MMOL/L (ref 0.4–2)
LACTATE BLDA-SCNC: 9.5 MMOL/L (ref 0.4–2)
LACTATE BLDA-SCNC: 9.7 MMOL/L (ref 0.4–2)
LACTATE BLDMV-SCNC: 9.6 MMOL/L (ref 0.4–2)
LEFT VENTRICLE INTERNAL DIMENSION DIASTOLE: 5.2 CM (ref 3.5–6)
LEFT VENTRICULAR OUTFLOW TRACT DIAMETER: 1.9 CM
LYMPHOCYTES # BLD AUTO: 0.14 X10*3/UL (ref 1.2–4.8)
LYMPHOCYTES # BLD AUTO: 0.34 X10*3/UL (ref 1.2–4.8)
LYMPHOCYTES # BLD MANUAL: 0.19 X10*3/UL (ref 1.2–4.8)
LYMPHOCYTES NFR BLD AUTO: 0.6 %
LYMPHOCYTES NFR BLD AUTO: 1.5 %
LYMPHOCYTES NFR BLD MANUAL: 0.8 %
MAGNESIUM SERPL-MCNC: 1.75 MG/DL (ref 1.6–2.4)
MAGNESIUM SERPL-MCNC: 1.88 MG/DL (ref 1.6–2.4)
MAGNESIUM SERPL-MCNC: 2.04 MG/DL (ref 1.6–2.4)
MCH RBC QN AUTO: 31.9 PG (ref 26–34)
MCH RBC QN AUTO: 32 PG (ref 26–34)
MCH RBC QN AUTO: 32.2 PG (ref 26–34)
MCHC RBC AUTO-ENTMCNC: 31.9 G/DL (ref 32–36)
MCHC RBC AUTO-ENTMCNC: 31.9 G/DL (ref 32–36)
MCHC RBC AUTO-ENTMCNC: 32.5 G/DL (ref 32–36)
MCV RBC AUTO: 100 FL (ref 80–100)
MCV RBC AUTO: 100 FL (ref 80–100)
MCV RBC AUTO: 99 FL (ref 80–100)
MONOCYTES # BLD AUTO: 0.96 X10*3/UL (ref 0.1–1)
MONOCYTES # BLD AUTO: 1.16 X10*3/UL (ref 0.1–1)
MONOCYTES # BLD MANUAL: 0.99 X10*3/UL (ref 0.1–1)
MONOCYTES NFR BLD AUTO: 4.4 %
MONOCYTES NFR BLD AUTO: 5.3 %
MONOCYTES NFR BLD MANUAL: 4.1 %
NEUTROPHILS # BLD AUTO: 20.02 X10*3/UL (ref 1.2–7.7)
NEUTROPHILS # BLD AUTO: 20.24 X10*3/UL (ref 1.2–7.7)
NEUTROPHILS NFR BLD AUTO: 92 %
NEUTROPHILS NFR BLD AUTO: 92.2 %
NEUTS SEG # BLD MANUAL: 22.73 X10*3/UL (ref 1.2–7)
NEUTS SEG NFR BLD MANUAL: 94.3 %
NRBC BLD-RTO: 2 /100 WBCS (ref 0–0)
NRBC BLD-RTO: 2.8 /100 WBCS (ref 0–0)
NRBC BLD-RTO: 3.1 /100 WBCS (ref 0–0)
OXYHGB MFR BLDA: 94.7 % (ref 94–98)
OXYHGB MFR BLDA: 94.9 % (ref 94–98)
OXYHGB MFR BLDA: 95.2 % (ref 94–98)
OXYHGB MFR BLDA: 95.4 % (ref 94–98)
OXYHGB MFR BLDA: 95.5 % (ref 94–98)
OXYHGB MFR BLDA: 95.6 % (ref 94–98)
OXYHGB MFR BLDA: 95.6 % (ref 94–98)
OXYHGB MFR BLDA: 95.7 % (ref 94–98)
OXYHGB MFR BLDA: 96.1 % (ref 94–98)
OXYHGB MFR BLDA: 96.2 % (ref 94–98)
OXYHGB MFR BLDA: 96.4 % (ref 94–98)
OXYHGB MFR BLDMV: 73.7 % (ref 45–75)
PCO2 BLDA: 28 MM HG (ref 38–42)
PCO2 BLDA: 30 MM HG (ref 38–42)
PCO2 BLDA: 31 MM HG (ref 38–42)
PCO2 BLDA: 31 MM HG (ref 38–42)
PCO2 BLDA: 32 MM HG (ref 38–42)
PCO2 BLDA: 33 MM HG (ref 38–42)
PCO2 BLDA: 33 MM HG (ref 38–42)
PCO2 BLDA: 34 MM HG (ref 38–42)
PCO2 BLDA: 37 MM HG (ref 38–42)
PCO2 BLDMV: 38 MM HG (ref 41–51)
PH BLDA: 7.2 PH (ref 7.38–7.42)
PH BLDA: 7.25 PH (ref 7.38–7.42)
PH BLDA: 7.25 PH (ref 7.38–7.42)
PH BLDA: 7.27 PH (ref 7.38–7.42)
PH BLDA: 7.29 PH (ref 7.38–7.42)
PH BLDA: 7.3 PH (ref 7.38–7.42)
PH BLDA: 7.3 PH (ref 7.38–7.42)
PH BLDA: 7.32 PH (ref 7.38–7.42)
PH BLDA: 7.32 PH (ref 7.38–7.42)
PH BLDA: 7.34 PH (ref 7.38–7.42)
PH BLDA: 7.37 PH (ref 7.38–7.42)
PH BLDMV: 7.28 PH (ref 7.33–7.43)
PHOSPHATE SERPL-MCNC: 3 MG/DL (ref 2.5–4.9)
PHOSPHATE SERPL-MCNC: 3.4 MG/DL (ref 2.5–4.9)
PHOSPHATE SERPL-MCNC: 4.7 MG/DL (ref 2.5–4.9)
PLATELET # BLD AUTO: 15 X10*3/UL (ref 150–450)
PLATELET # BLD AUTO: 6 X10*3/UL (ref 150–450)
PLATELET # BLD AUTO: 8 X10*3/UL (ref 150–450)
PO2 BLDA: 102 MM HG (ref 85–95)
PO2 BLDA: 102 MM HG (ref 85–95)
PO2 BLDA: 103 MM HG (ref 85–95)
PO2 BLDA: 104 MM HG (ref 85–95)
PO2 BLDA: 109 MM HG (ref 85–95)
PO2 BLDA: 109 MM HG (ref 85–95)
PO2 BLDA: 111 MM HG (ref 85–95)
PO2 BLDA: 114 MM HG (ref 85–95)
PO2 BLDA: 114 MM HG (ref 85–95)
PO2 BLDA: 121 MM HG (ref 85–95)
PO2 BLDA: 133 MM HG (ref 85–95)
PO2 BLDMV: 45 MM HG (ref 35–45)
POTASSIUM BLDA-SCNC: 3.9 MMOL/L (ref 3.5–5.3)
POTASSIUM BLDA-SCNC: 4 MMOL/L (ref 3.5–5.3)
POTASSIUM BLDA-SCNC: 4.1 MMOL/L (ref 3.5–5.3)
POTASSIUM BLDA-SCNC: 4.3 MMOL/L (ref 3.5–5.3)
POTASSIUM BLDA-SCNC: 4.4 MMOL/L (ref 3.5–5.3)
POTASSIUM BLDA-SCNC: 4.6 MMOL/L (ref 3.5–5.3)
POTASSIUM BLDA-SCNC: 4.7 MMOL/L (ref 3.5–5.3)
POTASSIUM BLDMV-SCNC: 4.6 MMOL/L (ref 3.5–5.3)
POTASSIUM SERPL-SCNC: 3.8 MMOL/L (ref 3.5–5.3)
POTASSIUM SERPL-SCNC: 3.8 MMOL/L (ref 3.5–5.3)
POTASSIUM SERPL-SCNC: 4.4 MMOL/L (ref 3.5–5.3)
POTASSIUM SERPL-SCNC: 4.6 MMOL/L (ref 3.5–5.3)
PROCALCITONIN SERPL-MCNC: 1.21 NG/ML
PROMYELOCYTES # BLD MANUAL: 0.19 X10*3/UL
PROMYELOCYTES NFR BLD MANUAL: 0.8 %
PROT SERPL-MCNC: 3.6 G/DL (ref 6.4–8.2)
PROT SERPL-MCNC: 4.3 G/DL (ref 6.4–8.2)
PROTHROMBIN TIME: 25.6 SECONDS (ref 9.8–12.8)
RBC # BLD AUTO: 2.04 X10*6/UL (ref 4–5.2)
RBC # BLD AUTO: 2.14 X10*6/UL (ref 4–5.2)
RBC # BLD AUTO: 2.28 X10*6/UL (ref 4–5.2)
RBC MORPH BLD: ABNORMAL
RH FACTOR (ANTIGEN D): NORMAL
RIGHT VENTRICLE PEAK SYSTOLIC PRESSURE: 42.6 MMHG
SAO2 % BLDA: 100 % (ref 94–100)
SAO2 % BLDA: 100 % (ref 94–100)
SAO2 % BLDA: 98 % (ref 94–100)
SAO2 % BLDA: 99 % (ref 94–100)
SAO2 % BLDMV: 76 % (ref 45–75)
SCHISTOCYTES BLD QL SMEAR: ABNORMAL
SODIUM BLDA-SCNC: 129 MMOL/L (ref 136–145)
SODIUM BLDA-SCNC: 130 MMOL/L (ref 136–145)
SODIUM BLDA-SCNC: 131 MMOL/L (ref 136–145)
SODIUM BLDA-SCNC: 132 MMOL/L (ref 136–145)
SODIUM BLDA-SCNC: 133 MMOL/L (ref 136–145)
SODIUM BLDMV-SCNC: 130 MMOL/L (ref 136–145)
SODIUM SERPL-SCNC: 133 MMOL/L (ref 136–145)
SODIUM SERPL-SCNC: 135 MMOL/L (ref 136–145)
SODIUM SERPL-SCNC: 135 MMOL/L (ref 136–145)
SODIUM SERPL-SCNC: 138 MMOL/L (ref 136–145)
TOTAL CELLS COUNTED BLD: 122
VANCOMYCIN SERPL-MCNC: 14.8 UG/ML (ref 5–20)
WBC # BLD AUTO: 21.8 X10*3/UL (ref 4.4–11.3)
WBC # BLD AUTO: 22 X10*3/UL (ref 4.4–11.3)
WBC # BLD AUTO: 24.1 X10*3/UL (ref 4.4–11.3)

## 2024-11-09 PROCEDURE — 86901 BLOOD TYPING SEROLOGIC RH(D): CPT

## 2024-11-09 PROCEDURE — 87040 BLOOD CULTURE FOR BACTERIA: CPT

## 2024-11-09 PROCEDURE — 87305 ASPERGILLUS AG IA: CPT

## 2024-11-09 PROCEDURE — 84132 ASSAY OF SERUM POTASSIUM: CPT

## 2024-11-09 PROCEDURE — 2500000001 HC RX 250 WO HCPCS SELF ADMINISTERED DRUGS (ALT 637 FOR MEDICARE OP)

## 2024-11-09 PROCEDURE — 36430 TRANSFUSION BLD/BLD COMPNT: CPT

## 2024-11-09 PROCEDURE — 2020000001 HC ICU ROOM DAILY

## 2024-11-09 PROCEDURE — 82947 ASSAY GLUCOSE BLOOD QUANT: CPT

## 2024-11-09 PROCEDURE — 2500000005 HC RX 250 GENERAL PHARMACY W/O HCPCS

## 2024-11-09 PROCEDURE — 85025 COMPLETE CBC W/AUTO DIFF WBC: CPT

## 2024-11-09 PROCEDURE — 71045 X-RAY EXAM CHEST 1 VIEW: CPT | Performed by: RADIOLOGY

## 2024-11-09 PROCEDURE — 83735 ASSAY OF MAGNESIUM: CPT

## 2024-11-09 PROCEDURE — 2500000004 HC RX 250 GENERAL PHARMACY W/ HCPCS (ALT 636 FOR OP/ED)

## 2024-11-09 PROCEDURE — 2500000002 HC RX 250 W HCPCS SELF ADMINISTERED DRUGS (ALT 637 FOR MEDICARE OP, ALT 636 FOR OP/ED)

## 2024-11-09 PROCEDURE — 85007 BL SMEAR W/DIFF WBC COUNT: CPT

## 2024-11-09 PROCEDURE — 86923 COMPATIBILITY TEST ELECTRIC: CPT

## 2024-11-09 PROCEDURE — 85027 COMPLETE CBC AUTOMATED: CPT

## 2024-11-09 PROCEDURE — 87449 NOS EACH ORGANISM AG IA: CPT

## 2024-11-09 PROCEDURE — 90937 HEMODIALYSIS REPEATED EVAL: CPT

## 2024-11-09 PROCEDURE — 85610 PROTHROMBIN TIME: CPT

## 2024-11-09 PROCEDURE — 82248 BILIRUBIN DIRECT: CPT

## 2024-11-09 PROCEDURE — 84132 ASSAY OF SERUM POTASSIUM: CPT | Performed by: STUDENT IN AN ORGANIZED HEALTH CARE EDUCATION/TRAINING PROGRAM

## 2024-11-09 PROCEDURE — 80202 ASSAY OF VANCOMYCIN: CPT

## 2024-11-09 PROCEDURE — 74018 RADEX ABDOMEN 1 VIEW: CPT | Performed by: RADIOLOGY

## 2024-11-09 PROCEDURE — P9037 PLATE PHERES LEUKOREDU IRRAD: HCPCS

## 2024-11-09 PROCEDURE — 37799 UNLISTED PX VASCULAR SURGERY: CPT

## 2024-11-09 PROCEDURE — 94003 VENT MGMT INPAT SUBQ DAY: CPT

## 2024-11-09 PROCEDURE — 84100 ASSAY OF PHOSPHORUS: CPT

## 2024-11-09 PROCEDURE — 87077 CULTURE AEROBIC IDENTIFY: CPT

## 2024-11-09 PROCEDURE — 99223 1ST HOSP IP/OBS HIGH 75: CPT | Performed by: SURGERY

## 2024-11-09 PROCEDURE — 94762 N-INVAS EAR/PLS OXIMTRY CONT: CPT

## 2024-11-09 PROCEDURE — 36415 COLL VENOUS BLD VENIPUNCTURE: CPT

## 2024-11-09 RX ORDER — SODIUM BICARBONATE 1 MEQ/ML
50 SYRINGE (ML) INTRAVENOUS ONCE
Status: COMPLETED | OUTPATIENT
Start: 2024-11-09 | End: 2024-11-09

## 2024-11-09 RX ORDER — NOREPINEPHRINE BITARTRATE 1 MG/ML
INJECTION, SOLUTION INTRAVENOUS
Status: DISCONTINUED
Start: 2024-11-09 | End: 2024-11-10 | Stop reason: HOSPADM

## 2024-11-09 RX ORDER — EPINEPHRINE 1 MG/ML
INJECTION INTRAMUSCULAR; INTRAVENOUS; SUBCUTANEOUS
Status: DISPENSED
Start: 2024-11-09 | End: 2024-11-09

## 2024-11-09 RX ORDER — EPINEPHRINE 0.1 MG/ML
1 INJECTION INTRACARDIAC; INTRAVENOUS ONCE
Status: COMPLETED | OUTPATIENT
Start: 2024-11-09 | End: 2024-11-09

## 2024-11-09 RX ORDER — SODIUM BICARBONATE 1 MEQ/ML
50 SYRINGE (ML) INTRAVENOUS ONCE
Status: COMPLETED | OUTPATIENT
Start: 2024-11-09 | End: 2024-11-10

## 2024-11-09 RX ORDER — VANCOMYCIN HYDROCHLORIDE 750 MG/150ML
750 INJECTION, SOLUTION INTRAVENOUS ONCE
Status: COMPLETED | OUTPATIENT
Start: 2024-11-09 | End: 2024-11-09

## 2024-11-09 RX ORDER — HEPARIN SODIUM 1000 [USP'U]/ML
INJECTION, SOLUTION INTRAVENOUS; SUBCUTANEOUS
Status: DISPENSED
Start: 2024-11-09 | End: 2024-11-09

## 2024-11-09 RX ORDER — MAGNESIUM SULFATE HEPTAHYDRATE 40 MG/ML
2 INJECTION, SOLUTION INTRAVENOUS ONCE
Status: COMPLETED | OUTPATIENT
Start: 2024-11-09 | End: 2024-11-09

## 2024-11-09 RX ORDER — EPINEPHRINE IN 0.9 % SOD CHLOR 4MG/250ML
0-.2 PLASTIC BAG, INJECTION (ML) INTRAVENOUS CONTINUOUS
Status: DISCONTINUED | OUTPATIENT
Start: 2024-11-09 | End: 2024-11-10

## 2024-11-09 RX ORDER — EPINEPHRINE HCL IN 0.9 % NACL 100 MCG/10
10 SYRINGE (ML) INTRAVENOUS ONCE
Status: DISCONTINUED | OUTPATIENT
Start: 2024-11-09 | End: 2024-11-09

## 2024-11-09 ASSESSMENT — PAIN - FUNCTIONAL ASSESSMENT
PAIN_FUNCTIONAL_ASSESSMENT: CPOT (CRITICAL CARE PAIN OBSERVATION TOOL)

## 2024-11-09 ASSESSMENT — PAIN SCALES - GENERAL
PAINLEVEL_OUTOF10: 0 - NO PAIN
PAINLEVEL_OUTOF10: 0 - NO PAIN

## 2024-11-09 NOTE — CONSULTS
"[place]  ACUTE CARE SURGERY - HISTORY AND PHYSICAL    Patient Name: Faviola Pleitez MRN: [id number]  Admit Date: 1028  : 1955  AGE: 69 y.o.   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 Bowel ischemia is possible, however, patient has not had bloody bowel movements, there are no specific abdominal exam findings, and mesenteric vasculature was unremarkable on prior imaging, though this was prior to recent events, including prolonged course of vasopressors. CTA would more definitively detect evidence of bowel ischemia. Lactate may be secondary to global hypoperfusion, given discoloration of distal BLE in the setting of complicated recent clinical events and pressor requirements.  Recommendations:   - Consider CTA A/P to more definitively    - Agree with ongoing GOC discussions    Discussed with attending Dr. Hines.    Mira Willson MD  PGY1 Acute Care Surgery  Service pager: [phone]  ==============================================================================    CHIEF COMPLAINT/REASON FOR CONSULT:  \"Concern for bowel ischemia\"    HISTORY OF PRESENT ILLNESS:  Patient was admitted for bideminy and rhinovirus on 10/22/2024, subsequently underwent PCI with intervention x2 on  and TAVR on ; post-op course has been complicated by cardiac arrest x3 following TVP removal on  requiring TVP replacement and left femoral pseudoaneurysm on . Patient now has increasing pressor requirements and rising lactate, prompting ACS consult. Unable to obtain ROS from patient due to sedation.     PAST MEDICAL HISTORY:  Past Medical History: "   Diagnosis Date    Acute bronchitis due to Rhinovirus 10/26/2024    Anemia     due to ESRD    Aneurysm (CMS-HCC)     brain    Anxiety     Arrhythmia     Atrial fibrillation (Multi) 10/26/2024    C. difficile colitis     Chronic kidney disease     Chronic respiratory failure     COPD (chronic obstructive pulmonary disease) (Multi)     Coronary artery disease     Depression     Diabetes mellitus (Multi)     ESRD (end stage renal disease) on dialysis (Multi)     Gallstones     Heart murmur     Hyperlipidemia     Hypertension     Lymphedema associated with obesity     Morbid obesity with BMI of 45.0-49.9, adult (Multi)     Neck pain     Pulmonary embolism     Pulmonary hypertension (Multi)     Sleep apnea        PAST SURGICAL HISTORY:  Past Surgical History:   Procedure Laterality Date    ANOMALOUS PULMONARY VENOUS RETURN REPAIR, TOTAL N/A 11/5/2024    Procedure: TAVR-OR;  Surgeon: Corey Trinidad MD;  Location: [place] [address] Cardiac Cath Lab;  Service: Cardiac Surgery;  Laterality: N/A;    AV FISTULA PLACEMENT, BRACHIOCEPHALIC Left 01/28/2022    CARDIAC CATHETERIZATION  2015    CARDIAC CATHETERIZATION N/A 11/1/2024    Procedure: Left & Right Heart Cath w Angiography & LV;  Surgeon: Joesph Francois MD;  Location: [place] [address] Cardiac Cath Lab;  Service: Cardiovascular;  Laterality: N/A;  ESRD T-Th-Sat    CARDIAC CATHETERIZATION N/A 11/5/2024    Procedure: TAVR (Transcatheter AV Replacement);  Surgeon: Lloyd Hart MD;  Location: [place] [address] Cardiac Cath Lab;  Service: Cardiovascular;  Laterality: N/A;  11/5. inpatient, [employer], Evolut 29 Fx+, access: good per Dr Hart    CARDIAC CATHETERIZATION N/A 11/5/2024    Procedure: TVP for TAVR;  Surgeon: Lloyd Hart MD;  Location: [place] [address] Cardiac Cath Lab;  Service: Cardiovascular;  Laterality: N/A;    CARDIAC ELECTROPHYSIOLOGY PROCEDURE N/A 11/6/2024    Procedure: Temporary Pacemaker Insertion;  Surgeon: Naman Maher DO;  Location:  [facility] Cardiac Cath Lab;  Service: Cardiovascular;  Laterality: N/A;    CARDIAC ELECTROPHYSIOLOGY PROCEDURE Right 2024    Procedure: PPM Leadless Implant;  Surgeon: Deniz García MD;  Location: [facility] Cardiac Cath Lab;  Service: Electrophysiology;  Laterality: Right;     SECTION, CLASSIC      EYE SURGERY Bilateral 2023    IVC FILTER RETRIEVAL  2023    NO MENTION OF RETRIEVAL    UPPER GASTROINTESTINAL ENDOSCOPY  2021    UPPER GASTROINTESTINAL ENDOSCOPY  2023    VASCULAR SURGERY  2021       PAST SOCIAL HISTORY:  Unable to obtain    PAST FAMILY HISTORY:  Family History   Problem Relation Name Age of Onset    Coronary artery disease Mother      Coronary artery disease Father      Stroke Father      Coronary artery disease Brother         HOME MEDICATIONS:  Prior to Admission medications    Medication Sig Start Date End Date Taking? Authorizing Provider   apixaban (Eliquis) 5 mg tablet Take 1 tablet (5 mg) by mouth 2 times a day. 10/31/24   Donte Herrera PA-C   aspirin 81 mg EC tablet Take 1 tablet (81 mg) by mouth once daily.    Historical Provider, MD   atorvastatin (Lipitor) 40 mg tablet Take 1 tablet (40 mg) by mouth once daily in the morning.    Historical Provider, MD   calcium acetate (Phoslo) 667 mg capsule Take 2 capsules (1,334 mg) by mouth 3 times daily (morning, midday, late afternoon). With meals    Historical Provider, MD   insulin aspart, with niacinamide, (Fiasp FlexTouch U-100 Insulin) 100 unit/mL (3 mL) injection Inject 12 Units under the skin 3 times daily (morning, midday, late afternoon). Take as directed per insulin instructions before meals    Historical Provider, MD   insulin glargine (Basaglar KwikPen U-100 Insulin) 100 unit/mL (3 mL) pen Inject 12 Units under the skin once daily in the morning. Take as directed per insulin instructions.    Historical Provider, MD   midodrine (Proamatine) 5 mg tablet Take 1 tablet prior to dialysis on  Tuesday, Thursday and Saturday    Historical Provider, MD       ALLERGIES  Allergies   Allergen Reactions    Morphine Itching and Respiratory depression    Tessalon [Benzonatate] Unknown    Bleach (Sodium Hypochlorite) Rash    Penicillins Rash       REVIEW OF SYSTEMS:  14-point ROS performed; negative unless otherwise indicated in HPI.    ==============================================================================    VITALS:  Vitals:    11/09/24 0300   BP:    Pulse: 90   Resp: 14   Temp:    SpO2: 96%       PHYSICAL EXAM:  Gen:  NAD, sedated  HEENT:  Atraumatic  Eyes:  Eyelids shut, does not open eyes to name  Card:  Tachycardic, regular rhythm  Resp:  Mechanically ventilated, FiO2 30%  Abd:  Soft, protuberant, no evidence of tenderness; reducible umbilical hernia; extensive ecchymosis over pannus  Ext:  1+ pitting edema of BLE; bilateral feet are warm with diffuse purple color and dry, cracking, worse distally; dry, necrotic wound on L hallux; see Media tab  Neuro:  Sedated    IMAGING SUMMARY:  CT C/A/P w contrast 10/31/2024: abdominal aorta and branches with minimal calcifications, celiac/SMA/KATY appear normal    LABS:  Results for orders placed or performed during the hospital encounter of 10/28/24 (from the past 24 hours)   Magnesium   Result Value Ref Range    Magnesium 1.86 1.60 - 2.40 mg/dL   Blood Gas Arterial Full Panel   Result Value Ref Range    POCT pH, Arterial 7.38 7.38 - 7.42 pH    POCT pCO2, Arterial 37 (L) 38 - 42 mm Hg    POCT pO2, Arterial 121 (H) 85 - 95 mm Hg    POCT SO2, Arterial 99 94 - 100 %    POCT Oxy Hemoglobin, Arterial 96.3 94.0 - 98.0 %    POCT Hematocrit Calculated, Arterial 26.0 (L) 36.0 - 46.0 %    POCT Sodium, Arterial 130 (L) 136 - 145 mmol/L    POCT Potassium, Arterial 4.5 3.5 - 5.3 mmol/L    POCT Chloride, Arterial      POCT Ionized Calcium, Arterial 1.23 1.10 - 1.33 mmol/L    POCT Glucose, Arterial 199 (H) 74 - 99 mg/dL    POCT Lactate, Arterial 2.2 (H) 0.4 - 2.0 mmol/L    POCT  Base Excess, Arterial -2.9 (L) -2.0 - 3.0 mmol/L    POCT HCO3 Calculated, Arterial 21.9 (L) 22.0 - 26.0 mmol/L    POCT Hemoglobin, Arterial 8.6 (L) 12.0 - 16.0 g/dL    POCT Anion Gap, Arterial      Patient Temperature 37.0 degrees Celsius    FiO2 30 %   CBC and Auto Differential   Result Value Ref Range    WBC 12.6 (H) 4.4 - 11.3 x10*3/uL    nRBC 0.4 (H) 0.0 - 0.0 /100 WBCs    RBC 2.67 (L) 4.00 - 5.20 x10*6/uL    Hemoglobin 8.6 (L) 12.0 - 16.0 g/dL    Hematocrit 26.1 (L) 36.0 - 46.0 %    MCV 98 80 - 100 fL    MCH 32.2 26.0 - 34.0 pg    MCHC 33.0 32.0 - 36.0 g/dL    RDW 17.9 (H) 11.5 - 14.5 %    Platelets 36 (LL) 150 - 450 x10*3/uL    Neutrophils % 80.0 40.0 - 80.0 %    Immature Granulocytes %, Automated 1.0 (H) 0.0 - 0.9 %    Lymphocytes % 10.2 13.0 - 44.0 %    Monocytes % 7.4 2.0 - 10.0 %    Eosinophils % 1.2 0.0 - 6.0 %    Basophils % 0.2 0.0 - 2.0 %    Neutrophils Absolute 10.07 (H) 1.20 - 7.70 x10*3/uL    Immature Granulocytes Absolute, Automated 0.12 0.00 - 0.70 x10*3/uL    Lymphocytes Absolute 1.29 1.20 - 4.80 x10*3/uL    Monocytes Absolute 0.93 0.10 - 1.00 x10*3/uL    Eosinophils Absolute 0.15 0.00 - 0.70 x10*3/uL    Basophils Absolute 0.03 0.00 - 0.10 x10*3/uL   Renal function panel   Result Value Ref Range    Glucose 205 (H) 74 - 99 mg/dL    Sodium 134 (L) 136 - 145 mmol/L    Potassium 4.5 3.5 - 5.3 mmol/L    Chloride 98 98 - 107 mmol/L    Bicarbonate 24 21 - 32 mmol/L    Anion Gap 17 10 - 20 mmol/L    Urea Nitrogen 34 (H) 6 - 23 mg/dL    Creatinine 3.92 (H) 0.50 - 1.05 mg/dL    eGFR 12 (L) >60 mL/min/1.73m*2    Calcium 8.9 8.6 - 10.6 mg/dL    Phosphorus 4.0 2.5 - 4.9 mg/dL    Albumin 2.6 (L) 3.4 - 5.0 g/dL   Vancomycin   Result Value Ref Range    Vancomycin 22.6 (H) 5.0 - 20.0 ug/mL   POCT GLUCOSE   Result Value Ref Range    POCT Glucose 173 (H) 74 - 99 mg/dL   POCT GLUCOSE   Result Value Ref Range    POCT Glucose 190 (H) 74 - 99 mg/dL   POCT GLUCOSE   Result Value Ref Range    POCT Glucose 191 (H) 74 - 99  mg/dL   POCT GLUCOSE   Result Value Ref Range    POCT Glucose 187 (H) 74 - 99 mg/dL   Renal function panel   Result Value Ref Range    Glucose 204 (H) 74 - 99 mg/dL    Sodium 135 (L) 136 - 145 mmol/L    Potassium 4.5 3.5 - 5.3 mmol/L    Chloride 100 98 - 107 mmol/L    Bicarbonate 23 21 - 32 mmol/L    Anion Gap 17 10 - 20 mmol/L    Urea Nitrogen 28 (H) 6 - 23 mg/dL    Creatinine 3.15 (H) 0.50 - 1.05 mg/dL    eGFR 16 (L) >60 mL/min/1.73m*2    Calcium 9.1 8.6 - 10.6 mg/dL    Phosphorus 4.1 2.5 - 4.9 mg/dL    Albumin 2.7 (L) 3.4 - 5.0 g/dL   Blood Gas Arterial Full Panel   Result Value Ref Range    POCT pH, Arterial 7.35 (L) 7.38 - 7.42 pH    POCT pCO2, Arterial 39 38 - 42 mm Hg    POCT pO2, Arterial 137 (H) 85 - 95 mm Hg    POCT SO2, Arterial      POCT Oxy Hemoglobin, Arterial      POCT Hematocrit Calculated, Arterial      POCT Sodium, Arterial 132 (L) 136 - 145 mmol/L    POCT Potassium, Arterial 4.5 3.5 - 5.3 mmol/L    POCT Chloride, Arterial 99 98 - 107 mmol/L    POCT Ionized Calcium, Arterial 1.29 1.10 - 1.33 mmol/L    POCT Glucose, Arterial 194 (H) 74 - 99 mg/dL    POCT Lactate, Arterial 3.0 (H) 0.4 - 2.0 mmol/L    POCT Base Excess, Arterial -3.8 (L) -2.0 - 3.0 mmol/L    POCT HCO3 Calculated, Arterial 21.5 (L) 22.0 - 26.0 mmol/L    POCT Hemoglobin, Arterial      POCT Anion Gap, Arterial 16 10 - 25 mmo/L    Patient Temperature 37.0 degrees Celsius    FiO2 30 %   POCT GLUCOSE   Result Value Ref Range    POCT Glucose 203 (H) 74 - 99 mg/dL   CBC and Auto Differential   Result Value Ref Range    WBC 14.3 (H) 4.4 - 11.3 x10*3/uL    nRBC 0.6 (H) 0.0 - 0.0 /100 WBCs    RBC 2.63 (L) 4.00 - 5.20 x10*6/uL    Hemoglobin 8.5 (L) 12.0 - 16.0 g/dL    Hematocrit 25.3 (L) 36.0 - 46.0 %    MCV 96 80 - 100 fL    MCH 32.3 26.0 - 34.0 pg    MCHC 33.6 32.0 - 36.0 g/dL    RDW 17.5 (H) 11.5 - 14.5 %    Platelets 27 (LL) 150 - 450 x10*3/uL    Neutrophils % 89.1 40.0 - 80.0 %    Immature Granulocytes %, Automated 0.6 0.0 - 0.9 %     Lymphocytes % 5.1 13.0 - 44.0 %    Monocytes % 4.3 2.0 - 10.0 %    Eosinophils % 0.7 0.0 - 6.0 %    Basophils % 0.2 0.0 - 2.0 %    Neutrophils Absolute 12.71 (H) 1.20 - 7.70 x10*3/uL    Immature Granulocytes Absolute, Automated 0.08 0.00 - 0.70 x10*3/uL    Lymphocytes Absolute 0.73 (L) 1.20 - 4.80 x10*3/uL    Monocytes Absolute 0.62 0.10 - 1.00 x10*3/uL    Eosinophils Absolute 0.10 0.00 - 0.70 x10*3/uL    Basophils Absolute 0.03 0.00 - 0.10 x10*3/uL   BLOOD GAS ARTERIAL FULL PANEL   Result Value Ref Range    POCT pH, Arterial 7.37 (L) 7.38 - 7.42 pH    POCT pCO2, Arterial 38 38 - 42 mm Hg    POCT pO2, Arterial 90 85 - 95 mm Hg    POCT SO2, Arterial 98 94 - 100 %    POCT Oxy Hemoglobin, Arterial 95.1 94.0 - 98.0 %    POCT Hematocrit Calculated, Arterial 27.0 (L) 36.0 - 46.0 %    POCT Sodium, Arterial 131 (L) 136 - 145 mmol/L    POCT Potassium, Arterial 4.4 3.5 - 5.3 mmol/L    POCT Chloride, Arterial 99 98 - 107 mmol/L    POCT Ionized Calcium, Arterial 1.27 1.10 - 1.33 mmol/L    POCT Glucose, Arterial 184 (H) 74 - 99 mg/dL    POCT Lactate, Arterial 2.8 (H) 0.4 - 2.0 mmol/L    POCT Base Excess, Arterial -3.0 (L) -2.0 - 3.0 mmol/L    POCT HCO3 Calculated, Arterial 22.0 22.0 - 26.0 mmol/L    POCT Hemoglobin, Arterial 8.9 (L) 12.0 - 16.0 g/dL    POCT Anion Gap, Arterial 14 10 - 25 mmo/L    Patient Temperature 37.0 degrees Celsius    FiO2 30 %   POCT GLUCOSE   Result Value Ref Range    POCT Glucose 146 (H) 74 - 99 mg/dL   Renal Function Panel   Result Value Ref Range    Glucose 177 (H) 74 - 99 mg/dL    Sodium 135 (L) 136 - 145 mmol/L    Potassium 4.5 3.5 - 5.3 mmol/L    Chloride 100 98 - 107 mmol/L    Bicarbonate 22 21 - 32 mmol/L    Anion Gap 18 10 - 20 mmol/L    Urea Nitrogen 25 (H) 6 - 23 mg/dL    Creatinine 2.75 (H) 0.50 - 1.05 mg/dL    eGFR 18 (L) >60 mL/min/1.73m*2    Calcium 9.0 8.6 - 10.6 mg/dL    Phosphorus 3.9 2.5 - 4.9 mg/dL    Albumin 2.7 (L) 3.4 - 5.0 g/dL   Magnesium   Result Value Ref Range    Magnesium 1.83  1.60 - 2.40 mg/dL   Hepatic Function Panel   Result Value Ref Range    Albumin 2.7 (L) 3.4 - 5.0 g/dL    Bilirubin, Total 2.1 (H) 0.0 - 1.2 mg/dL    Bilirubin, Direct 0.8 (H) 0.0 - 0.3 mg/dL    Alkaline Phosphatase 88 33 - 136 U/L     (H) 7 - 45 U/L     (H) 9 - 39 U/L    Total Protein 4.6 (L) 6.4 - 8.2 g/dL   Hepatic Function Panel   Result Value Ref Range    Albumin 2.7 (L) 3.4 - 5.0 g/dL    Bilirubin, Total 2.1 (H) 0.0 - 1.2 mg/dL    Bilirubin, Direct 0.8 (H) 0.0 - 0.3 mg/dL    Alkaline Phosphatase 88 33 - 136 U/L     (H) 7 - 45 U/L     (H) 9 - 39 U/L    Total Protein 4.6 (L) 6.4 - 8.2 g/dL   POCT GLUCOSE   Result Value Ref Range    POCT Glucose 95 74 - 99 mg/dL   Blood Gas Arterial Full Panel   Result Value Ref Range    POCT pH, Arterial 7.31 (L) 7.38 - 7.42 pH    POCT pCO2, Arterial 34 (L) 38 - 42 mm Hg    POCT pO2, Arterial 112 (H) 85 - 95 mm Hg    POCT SO2, Arterial 99 94 - 100 %    POCT Oxy Hemoglobin, Arterial 96.0 94.0 - 98.0 %    POCT Hematocrit Calculated, Arterial 26.0 (L) 36.0 - 46.0 %    POCT Sodium, Arterial 131 (L) 136 - 145 mmol/L    POCT Potassium, Arterial 4.9 3.5 - 5.3 mmol/L    POCT Chloride, Arterial 101 98 - 107 mmol/L    POCT Ionized Calcium, Arterial 1.26 1.10 - 1.33 mmol/L    POCT Glucose, Arterial 157 (H) 74 - 99 mg/dL    POCT Lactate, Arterial 6.8 (HH) 0.4 - 2.0 mmol/L    POCT Base Excess, Arterial -8.4 (L) -2.0 - 3.0 mmol/L    POCT HCO3 Calculated, Arterial 17.1 (L) 22.0 - 26.0 mmol/L    POCT Hemoglobin, Arterial 8.7 (L) 12.0 - 16.0 g/dL    POCT Anion Gap, Arterial 18 10 - 25 mmo/L    Patient Temperature 37.0 degrees Celsius    FiO2 30 %   Blood Gas Arterial Full Panel   Result Value Ref Range    POCT pH, Arterial 7.31 (L) 7.38 - 7.42 pH    POCT pCO2, Arterial 35 (L) 38 - 42 mm Hg    POCT pO2, Arterial 101 (H) 85 - 95 mm Hg    POCT SO2, Arterial 98 94 - 100 %    POCT Oxy Hemoglobin, Arterial 94.5 94.0 - 98.0 %    POCT Hematocrit Calculated, Arterial 30.0 (L)  36.0 - 46.0 %    POCT Sodium, Arterial 129 (L) 136 - 145 mmol/L    POCT Potassium, Arterial 4.8 3.5 - 5.3 mmol/L    POCT Chloride, Arterial 99 98 - 107 mmol/L    POCT Ionized Calcium, Arterial 1.20 1.10 - 1.33 mmol/L    POCT Glucose, Arterial 162 (H) 74 - 99 mg/dL    POCT Lactate, Arterial 7.2 (HH) 0.4 - 2.0 mmol/L    POCT Base Excess, Arterial -7.9 (L) -2.0 - 3.0 mmol/L    POCT HCO3 Calculated, Arterial 17.6 (L) 22.0 - 26.0 mmol/L    POCT Hemoglobin, Arterial 10.1 (L) 12.0 - 16.0 g/dL    POCT Anion Gap, Arterial 17 10 - 25 mmo/L    Patient Temperature 37.0 degrees Celsius    FiO2 30 %   Renal function panel   Result Value Ref Range    Glucose 155 (H) 74 - 99 mg/dL    Sodium 135 (L) 136 - 145 mmol/L    Potassium 4.6 3.5 - 5.3 mmol/L    Chloride 100 98 - 107 mmol/L    Bicarbonate 19 (L) 21 - 32 mmol/L    Anion Gap 21 (H) 10 - 20 mmol/L    Urea Nitrogen 21 6 - 23 mg/dL    Creatinine 2.53 (H) 0.50 - 1.05 mg/dL    eGFR 20 (L) >60 mL/min/1.73m*2    Calcium 8.6 8.6 - 10.6 mg/dL    Phosphorus 3.9 2.5 - 4.9 mg/dL    Albumin 2.6 (L) 3.4 - 5.0 g/dL   CBC and Auto Differential   Result Value Ref Range    WBC 18.3 (H) 4.4 - 11.3 x10*3/uL    nRBC 1.9 (H) 0.0 - 0.0 /100 WBCs    RBC 2.58 (L) 4.00 - 5.20 x10*6/uL    Hemoglobin 8.0 (L) 12.0 - 16.0 g/dL    Hematocrit 25.6 (L) 36.0 - 46.0 %    MCV 99 80 - 100 fL    MCH 31.0 26.0 - 34.0 pg    MCHC 31.3 (L) 32.0 - 36.0 g/dL    RDW 18.8 (H) 11.5 - 14.5 %    Platelets 19 (LL) 150 - 450 x10*3/uL    Neutrophils % 92.9 40.0 - 80.0 %    Immature Granulocytes %, Automated 1.6 (H) 0.0 - 0.9 %    Lymphocytes % 1.0 13.0 - 44.0 %    Monocytes % 4.3 2.0 - 10.0 %    Eosinophils % 0.1 0.0 - 6.0 %    Basophils % 0.1 0.0 - 2.0 %    Neutrophils Absolute 16.99 (H) 1.20 - 7.70 x10*3/uL    Immature Granulocytes Absolute, Automated 0.29 0.00 - 0.70 x10*3/uL    Lymphocytes Absolute 0.18 (L) 1.20 - 4.80 x10*3/uL    Monocytes Absolute 0.78 0.10 - 1.00 x10*3/uL    Eosinophils Absolute 0.01 0.00 - 0.70 x10*3/uL     Basophils Absolute 0.02 0.00 - 0.10 x10*3/uL   Blood Gas Arterial Full Panel   Result Value Ref Range    POCT pH, Arterial 7.31 (L) 7.38 - 7.42 pH    POCT pCO2, Arterial 33 (L) 38 - 42 mm Hg    POCT pO2, Arterial 102 (H) 85 - 95 mm Hg    POCT SO2, Arterial 99 94 - 100 %    POCT Oxy Hemoglobin, Arterial 95.5 94.0 - 98.0 %    POCT Hematocrit Calculated, Arterial 25.0 (L) 36.0 - 46.0 %    POCT Sodium, Arterial 130 (L) 136 - 145 mmol/L    POCT Potassium, Arterial 4.8 3.5 - 5.3 mmol/L    POCT Chloride, Arterial 101 98 - 107 mmol/L    POCT Ionized Calcium, Arterial 1.19 1.10 - 1.33 mmol/L    POCT Glucose, Arterial 146 (H) 74 - 99 mg/dL    POCT Lactate, Arterial 7.4 (HH) 0.4 - 2.0 mmol/L    POCT Base Excess, Arterial -8.8 (L) -2.0 - 3.0 mmol/L    POCT HCO3 Calculated, Arterial 16.6 (L) 22.0 - 26.0 mmol/L    POCT Hemoglobin, Arterial 8.2 (L) 12.0 - 16.0 g/dL    POCT Anion Gap, Arterial 17 10 - 25 mmo/L    Patient Temperature 37.0 degrees Celsius    FiO2 30 %   POCT GLUCOSE   Result Value Ref Range    POCT Glucose 38 (L) 74 - 99 mg/dL   POCT GLUCOSE   Result Value Ref Range    POCT Glucose 156 (H) 74 - 99 mg/dL   Blood Gas Arterial Full Panel   Result Value Ref Range    POCT pH, Arterial 7.25 (LL) 7.38 - 7.42 pH    POCT pCO2, Arterial 30 (L) 38 - 42 mm Hg    POCT pO2, Arterial 114 (H) 85 - 95 mm Hg    POCT SO2, Arterial 99 94 - 100 %    POCT Oxy Hemoglobin, Arterial 95.7 94.0 - 98.0 %    POCT Hematocrit Calculated, Arterial 24.0 (L) 36.0 - 46.0 %    POCT Sodium, Arterial 130 (L) 136 - 145 mmol/L    POCT Potassium, Arterial 4.7 3.5 - 5.3 mmol/L    POCT Chloride, Arterial 102 98 - 107 mmol/L    POCT Ionized Calcium, Arterial 1.16 1.10 - 1.33 mmol/L    POCT Glucose, Arterial 163 (H) 74 - 99 mg/dL    POCT Lactate, Arterial 8.8 (HH) 0.4 - 2.0 mmol/L    POCT Base Excess, Arterial -12.8 (L) -2.0 - 3.0 mmol/L    POCT HCO3 Calculated, Arterial 13.2 (L) 22.0 - 26.0 mmol/L    POCT Hemoglobin, Arterial 8.1 (L) 12.0 - 16.0 g/dL     POCT Anion Gap, Arterial 20 10 - 25 mmo/L    Patient Temperature 37.0 degrees Celsius    FiO2 30 %   Blood Gas Arterial Full Panel   Result Value Ref Range    POCT pH, Arterial 7.20 (LL) 7.38 - 7.42 pH    POCT pCO2, Arterial 28 (L) 38 - 42 mm Hg    POCT pO2, Arterial 103 (H) 85 - 95 mm Hg    POCT SO2, Arterial 98 94 - 100 %    POCT Oxy Hemoglobin, Arterial 94.7 94.0 - 98.0 %    POCT Hematocrit Calculated, Arterial 23.0 (L) 36.0 - 46.0 %    POCT Sodium, Arterial 133 (L) 136 - 145 mmol/L    POCT Potassium, Arterial 4.4 3.5 - 5.3 mmol/L    POCT Chloride, Arterial 102 98 - 107 mmol/L    POCT Ionized Calcium, Arterial 1.11 1.10 - 1.33 mmol/L    POCT Glucose, Arterial 163 (H) 74 - 99 mg/dL    POCT Lactate, Arterial 12.6 (HH) 0.4 - 2.0 mmol/L    POCT Base Excess, Arterial -15.7 (L) -2.0 - 3.0 mmol/L    POCT HCO3 Calculated, Arterial 10.9 (L) 22.0 - 26.0 mmol/L    POCT Hemoglobin, Arterial 7.6 (L) 12.0 - 16.0 g/dL    POCT Anion Gap, Arterial 25 10 - 25 mmo/L    Patient Temperature 37.0 degrees Celsius    FiO2 30 %       I have reviewed all laboratory and imaging results ordered/pertinent for this encounter.    I saw and evaluated the patient. I personally obtained the key and critical portions of the history and physical exam. I reviewed the resident’s documentation and discussed the patient with the resident. I agree with the resident’s medical decision making as documented in the resident’s note.    69F h/o ESRD, HFrEF, CAD s/p PCI, and severe aortic stenosis who is admitted after TAVR with post-op course notable for recurrent cardiac arrest, and persistent shock. ACS consulted due to increasing vasopressor requirements and rising lactate (12). On exam, pt intubated and sedated. Abdomen obese, soft, umbilical hernia. Bruising across lower abdomen. Bilateral groin dressings in place. Norepi 0.24, Vaso 0.06, Epi 0.06. On stress-dose steroids. Labs reviewed and notable for pH 7.2, BD 15.7, lactate 12.6 (from 8). Overall my  concern is for global hypoperfusion driving the lactic acidosis, which very likely also involves some degree of hypoperfusion of the bowels. However, I do not think there would be clinical value in exploratory abdominal surgery at this point as the chance of finding a focal segment of intestine to resect is very low. In discussion with the cardiac ICU team overnight pt too unstable to move for CT but will continue resuscitation, empiric antibiotics, and attempt to get CT if pt becomes a bit more stable. ACS will continue to follow.    Jarrod Hines MD  Trauma, Critical Care, and Acute Care Surgery  Pager: [phone]

## 2024-11-09 NOTE — PROGRESS NOTES
Faviola Pleitez is a 69 y.o. female on day 12 of admission presenting with Severe aortic stenosis.    Subjective   Overnight patient had an overall worsening picture. IVC ultrasound was done and was small, gave 500cc NS and CVVH is being run net even. Lactic acid up trended from 6.3 to 11.3, with last lactic at 10.2. Bedside TTE was done with concern for AI, formal TTE limited was ordered. Thought possibly patient had a component of cardiogenic shock so dobutamine was trialed and patient did very poorly on this. Only on it for 3 minutes. Dobutamine was discontinued, was given epi push with SBP going into 200's. Patient was put on epi drip, which was turned off around 6AM, and only on levo and vaso. Also started on bicarb gtt overnight at 50ml/hr. Was given 1.5L LR overnight.    This morning patient is being trialed off fent, moving arms and feet. Unable to get pulses on feet and hard to hear any bowel sounds. Patient is still on bicarb gtt, levo, and vaso currently. Not able to follow commands or nod yes/no to ROS questions. Patient is on bear hugger, warming fluids for CVVH.    Objective     Last Recorded Vitals  BP (!) 121/43   Pulse 90   Temp 36.1 °C (97 °F) (Temporal)   Resp 16   Wt 136 kg (300 lb)   SpO2 97%   Intake/Output last 3 Shifts:    Intake/Output Summary (Last 24 hours) at 11/9/2024 0912  Last data filed at 11/9/2024 0600  Gross per 24 hour   Intake 3465.8 ml   Output 389 ml   Net 3076.8 ml       Admission Weight  Weight: 119 kg (262 lb 12.6 oz) (10/28/24 0829)    Daily Weight  11/09/24 : 136 kg (300 lb)    Physical exam:  Constitutional: Intubated and sedated on bear hugger  HEENT: Normocephalic, atraumatic, PERRLA, EOMI, moist mucous membranes  Cardiovascular: RRR, normal S1/S2, no murmurs noted  Pulmonary: Clear to auscultation b/l, no wheezes/crackles/rhonchi, intubated VCAC  GI: Soft, non-tender, non-distended, significant bruising on abdomen with umbilical hernia, no appreciable BS  : L  groin with hematoma. EP evaluated R groin access at bedside with no significant oozing noted  Lower extremities: Warm, unable to find doppler pulses on b/l LE. 1+ LE edema, discoloration of b/l feet (purple tinged) worsening.  Neuro: Sedation trial off, but unable to wake up and follow commands, moving extremities  Skin: Significant bruising particularly on abdomen marker with marker, L groin hematoma    Image Results  XR abdomen 1 view  Narrative: STUDY:  XR ABDOMEN 1 VIEW;  11/9/2024 3:26 am      INDICATION:  Signs/Symptoms:concern for bowel obstruction.      COMPARISON:  Chest x-ray 11/08/2024.      ACCESSION NUMBER(S):  [id number]      ORDERING CLINICIAN:  THUY PATTERSON      FINDINGS:  Enteric tube courses midline below the level of the diaphragm with  the tip projecting over the gastric body.  Nonobstructive bowel gas  pattern. There is a oral contrast visualized to the level of the  rectum. Moderate colonic stool burden. No gross evidence of free air  is noted. Residual degree of contrast layering within the dependent  portions of the stomach.      Visualized lungs are clear.      Osseous structures demonstrate no acute bony changes.      Impression: 1.  Nonobstructive bowel-gas pattern with oral contrast visualized to  the level of the rectum. Moderate colonic stool burden.      I personally reviewed the images/study and I agree with the findings  as stated by resident Marco High. This study was interpreted  at [facility], [place], [place].      MACRO:  None          Dictation workstation:   [id number]      Relevant Results  Scheduled medications  [Held by provider] amiodarone, 200 mg, oral, Daily  [Held by provider] apixaban, 5 mg, oral, BID  aspirin, 81 mg, oral, Daily  atorvastatin, 40 mg, oral, q AM  clopidogrel, 75 mg, oral, Daily  EPINEPHrine, , ,   pantoprazole, 40 mg, oral, Daily before breakfast   Or  esomeprazole, 40 mg, nasoduodenal tube, Daily  before breakfast   Or  pantoprazole, 40 mg, intravenous, Daily before breakfast  fluticasone furoate-vilanteroL, 1 puff, inhalation, Daily  heparin, , ,   hydrocortisone sodium succinate, 50 mg, intravenous, q6h  insulin lispro, 0-10 Units, subcutaneous, q4h  [Held by provider] melatonin, 6 mg, oral, Nightly  meropenem, 1,000 mg, intravenous, q12h  oxygen, , inhalation, Continuous - Inhalation  perflutren lipid microspheres, 0.5-10 mL of dilution, intravenous, Once in imaging  polyethylene glycol, 17 g, oral, Daily  sennosides-docusate sodium, 1 tablet, oral, Nightly  sodium chloride, 1,000 mL, CRRT, Once  vancomycin, 750 mg, intravenous, Once  vitamin B complex-vitamin C-folic acid, 1 capsule, oral, Daily      Continuous medications  EPINEPHrine, 0-0.2 mcg/kg/min, Last Rate: Stopped (11/09/24 0550)  fentaNYL,  mcg/hr, Last Rate: 50 mcg/hr (11/09/24 0400)  midazolam, 0-20 mg/hr, Last Rate: 1 mg/hr (11/08/24 0400)  norepinephrine, 0-0.5 mcg/kg/min, Last Rate: 0.26 mcg/kg/min (11/09/24 0600)  PrismaSol BGK 2/3.5, 25 mL/kg/hr, Last Rate: 25 mL/kg/hr (11/08/24 1347)  sodium bicarbonate 150 mEq in dextrose 5% 1,150 mL infusion, 50 mL/hr, Last Rate: 50 mL/hr (11/09/24 0400)  vasopressin, 0-0.06 Units/min, Last Rate: 0.06 Units/min (11/09/24 0400)      PRN medications  PRN medications: acetaminophen **OR** acetaminophen **OR** acetaminophen, bisacodyl, dextrose, EPINEPHrine, glucagon, glucagon, glucagon HCL, heparin, melatonin, midazolam, midazolam, ondansetron, polyethylene glycol, sodium chloride, vancomycin    Results for orders placed or performed during the hospital encounter of 10/28/24 (from the past 24 hours)   POCT GLUCOSE   Result Value Ref Range    POCT Glucose 191 (H) 74 - 99 mg/dL   POCT GLUCOSE   Result Value Ref Range    POCT Glucose 187 (H) 74 - 99 mg/dL   Renal function panel   Result Value Ref Range    Glucose 204 (H) 74 - 99 mg/dL    Sodium 135 (L) 136 - 145 mmol/L    Potassium 4.5 3.5 - 5.3 mmol/L     Chloride 100 98 - 107 mmol/L    Bicarbonate 23 21 - 32 mmol/L    Anion Gap 17 10 - 20 mmol/L    Urea Nitrogen 28 (H) 6 - 23 mg/dL    Creatinine 3.15 (H) 0.50 - 1.05 mg/dL    eGFR 16 (L) >60 mL/min/1.73m*2    Calcium 9.1 8.6 - 10.6 mg/dL    Phosphorus 4.1 2.5 - 4.9 mg/dL    Albumin 2.7 (L) 3.4 - 5.0 g/dL   Blood Gas Arterial Full Panel   Result Value Ref Range    POCT pH, Arterial 7.35 (L) 7.38 - 7.42 pH    POCT pCO2, Arterial 39 38 - 42 mm Hg    POCT pO2, Arterial 137 (H) 85 - 95 mm Hg    POCT SO2, Arterial      POCT Oxy Hemoglobin, Arterial      POCT Hematocrit Calculated, Arterial      POCT Sodium, Arterial 132 (L) 136 - 145 mmol/L    POCT Potassium, Arterial 4.5 3.5 - 5.3 mmol/L    POCT Chloride, Arterial 99 98 - 107 mmol/L    POCT Ionized Calcium, Arterial 1.29 1.10 - 1.33 mmol/L    POCT Glucose, Arterial 194 (H) 74 - 99 mg/dL    POCT Lactate, Arterial 3.0 (H) 0.4 - 2.0 mmol/L    POCT Base Excess, Arterial -3.8 (L) -2.0 - 3.0 mmol/L    POCT HCO3 Calculated, Arterial 21.5 (L) 22.0 - 26.0 mmol/L    POCT Hemoglobin, Arterial      POCT Anion Gap, Arterial 16 10 - 25 mmo/L    Patient Temperature 37.0 degrees Celsius    FiO2 30 %   POCT GLUCOSE   Result Value Ref Range    POCT Glucose 203 (H) 74 - 99 mg/dL   CBC and Auto Differential   Result Value Ref Range    WBC 14.3 (H) 4.4 - 11.3 x10*3/uL    nRBC 0.6 (H) 0.0 - 0.0 /100 WBCs    RBC 2.63 (L) 4.00 - 5.20 x10*6/uL    Hemoglobin 8.5 (L) 12.0 - 16.0 g/dL    Hematocrit 25.3 (L) 36.0 - 46.0 %    MCV 96 80 - 100 fL    MCH 32.3 26.0 - 34.0 pg    MCHC 33.6 32.0 - 36.0 g/dL    RDW 17.5 (H) 11.5 - 14.5 %    Platelets 27 (LL) 150 - 450 x10*3/uL    Neutrophils % 89.1 40.0 - 80.0 %    Immature Granulocytes %, Automated 0.6 0.0 - 0.9 %    Lymphocytes % 5.1 13.0 - 44.0 %    Monocytes % 4.3 2.0 - 10.0 %    Eosinophils % 0.7 0.0 - 6.0 %    Basophils % 0.2 0.0 - 2.0 %    Neutrophils Absolute 12.71 (H) 1.20 - 7.70 x10*3/uL    Immature Granulocytes Absolute, Automated 0.08 0.00 -  0.70 x10*3/uL    Lymphocytes Absolute 0.73 (L) 1.20 - 4.80 x10*3/uL    Monocytes Absolute 0.62 0.10 - 1.00 x10*3/uL    Eosinophils Absolute 0.10 0.00 - 0.70 x10*3/uL    Basophils Absolute 0.03 0.00 - 0.10 x10*3/uL   BLOOD GAS ARTERIAL FULL PANEL   Result Value Ref Range    POCT pH, Arterial 7.37 (L) 7.38 - 7.42 pH    POCT pCO2, Arterial 38 38 - 42 mm Hg    POCT pO2, Arterial 90 85 - 95 mm Hg    POCT SO2, Arterial 98 94 - 100 %    POCT Oxy Hemoglobin, Arterial 95.1 94.0 - 98.0 %    POCT Hematocrit Calculated, Arterial 27.0 (L) 36.0 - 46.0 %    POCT Sodium, Arterial 131 (L) 136 - 145 mmol/L    POCT Potassium, Arterial 4.4 3.5 - 5.3 mmol/L    POCT Chloride, Arterial 99 98 - 107 mmol/L    POCT Ionized Calcium, Arterial 1.27 1.10 - 1.33 mmol/L    POCT Glucose, Arterial 184 (H) 74 - 99 mg/dL    POCT Lactate, Arterial 2.8 (H) 0.4 - 2.0 mmol/L    POCT Base Excess, Arterial -3.0 (L) -2.0 - 3.0 mmol/L    POCT HCO3 Calculated, Arterial 22.0 22.0 - 26.0 mmol/L    POCT Hemoglobin, Arterial 8.9 (L) 12.0 - 16.0 g/dL    POCT Anion Gap, Arterial 14 10 - 25 mmo/L    Patient Temperature 37.0 degrees Celsius    FiO2 30 %   POCT GLUCOSE   Result Value Ref Range    POCT Glucose 146 (H) 74 - 99 mg/dL   Renal Function Panel   Result Value Ref Range    Glucose 177 (H) 74 - 99 mg/dL    Sodium 135 (L) 136 - 145 mmol/L    Potassium 4.5 3.5 - 5.3 mmol/L    Chloride 100 98 - 107 mmol/L    Bicarbonate 22 21 - 32 mmol/L    Anion Gap 18 10 - 20 mmol/L    Urea Nitrogen 25 (H) 6 - 23 mg/dL    Creatinine 2.75 (H) 0.50 - 1.05 mg/dL    eGFR 18 (L) >60 mL/min/1.73m*2    Calcium 9.0 8.6 - 10.6 mg/dL    Phosphorus 3.9 2.5 - 4.9 mg/dL    Albumin 2.7 (L) 3.4 - 5.0 g/dL   Magnesium   Result Value Ref Range    Magnesium 1.83 1.60 - 2.40 mg/dL   Hepatic Function Panel   Result Value Ref Range    Albumin 2.7 (L) 3.4 - 5.0 g/dL    Bilirubin, Total 2.1 (H) 0.0 - 1.2 mg/dL    Bilirubin, Direct 0.8 (H) 0.0 - 0.3 mg/dL    Alkaline Phosphatase 88 33 - 136 U/L    ALT  498 (H) 7 - 45 U/L     (H) 9 - 39 U/L    Total Protein 4.6 (L) 6.4 - 8.2 g/dL   Hepatic Function Panel   Result Value Ref Range    Albumin 2.7 (L) 3.4 - 5.0 g/dL    Bilirubin, Total 2.1 (H) 0.0 - 1.2 mg/dL    Bilirubin, Direct 0.8 (H) 0.0 - 0.3 mg/dL    Alkaline Phosphatase 88 33 - 136 U/L     (H) 7 - 45 U/L     (H) 9 - 39 U/L    Total Protein 4.6 (L) 6.4 - 8.2 g/dL   POCT GLUCOSE   Result Value Ref Range    POCT Glucose 95 74 - 99 mg/dL   Blood Gas Arterial Full Panel   Result Value Ref Range    POCT pH, Arterial 7.31 (L) 7.38 - 7.42 pH    POCT pCO2, Arterial 34 (L) 38 - 42 mm Hg    POCT pO2, Arterial 112 (H) 85 - 95 mm Hg    POCT SO2, Arterial 99 94 - 100 %    POCT Oxy Hemoglobin, Arterial 96.0 94.0 - 98.0 %    POCT Hematocrit Calculated, Arterial 26.0 (L) 36.0 - 46.0 %    POCT Sodium, Arterial 131 (L) 136 - 145 mmol/L    POCT Potassium, Arterial 4.9 3.5 - 5.3 mmol/L    POCT Chloride, Arterial 101 98 - 107 mmol/L    POCT Ionized Calcium, Arterial 1.26 1.10 - 1.33 mmol/L    POCT Glucose, Arterial 157 (H) 74 - 99 mg/dL    POCT Lactate, Arterial 6.8 (HH) 0.4 - 2.0 mmol/L    POCT Base Excess, Arterial -8.4 (L) -2.0 - 3.0 mmol/L    POCT HCO3 Calculated, Arterial 17.1 (L) 22.0 - 26.0 mmol/L    POCT Hemoglobin, Arterial 8.7 (L) 12.0 - 16.0 g/dL    POCT Anion Gap, Arterial 18 10 - 25 mmo/L    Patient Temperature 37.0 degrees Celsius    FiO2 30 %   Blood Gas Arterial Full Panel   Result Value Ref Range    POCT pH, Arterial 7.31 (L) 7.38 - 7.42 pH    POCT pCO2, Arterial 35 (L) 38 - 42 mm Hg    POCT pO2, Arterial 101 (H) 85 - 95 mm Hg    POCT SO2, Arterial 98 94 - 100 %    POCT Oxy Hemoglobin, Arterial 94.5 94.0 - 98.0 %    POCT Hematocrit Calculated, Arterial 30.0 (L) 36.0 - 46.0 %    POCT Sodium, Arterial 129 (L) 136 - 145 mmol/L    POCT Potassium, Arterial 4.8 3.5 - 5.3 mmol/L    POCT Chloride, Arterial 99 98 - 107 mmol/L    POCT Ionized Calcium, Arterial 1.20 1.10 - 1.33 mmol/L    POCT Glucose,  Arterial 162 (H) 74 - 99 mg/dL    POCT Lactate, Arterial 7.2 (HH) 0.4 - 2.0 mmol/L    POCT Base Excess, Arterial -7.9 (L) -2.0 - 3.0 mmol/L    POCT HCO3 Calculated, Arterial 17.6 (L) 22.0 - 26.0 mmol/L    POCT Hemoglobin, Arterial 10.1 (L) 12.0 - 16.0 g/dL    POCT Anion Gap, Arterial 17 10 - 25 mmo/L    Patient Temperature 37.0 degrees Celsius    FiO2 30 %   Renal function panel   Result Value Ref Range    Glucose 155 (H) 74 - 99 mg/dL    Sodium 135 (L) 136 - 145 mmol/L    Potassium 4.6 3.5 - 5.3 mmol/L    Chloride 100 98 - 107 mmol/L    Bicarbonate 19 (L) 21 - 32 mmol/L    Anion Gap 21 (H) 10 - 20 mmol/L    Urea Nitrogen 21 6 - 23 mg/dL    Creatinine 2.53 (H) 0.50 - 1.05 mg/dL    eGFR 20 (L) >60 mL/min/1.73m*2    Calcium 8.6 8.6 - 10.6 mg/dL    Phosphorus 3.9 2.5 - 4.9 mg/dL    Albumin 2.6 (L) 3.4 - 5.0 g/dL   CBC and Auto Differential   Result Value Ref Range    WBC 18.3 (H) 4.4 - 11.3 x10*3/uL    nRBC 1.9 (H) 0.0 - 0.0 /100 WBCs    RBC 2.58 (L) 4.00 - 5.20 x10*6/uL    Hemoglobin 8.0 (L) 12.0 - 16.0 g/dL    Hematocrit 25.6 (L) 36.0 - 46.0 %    MCV 99 80 - 100 fL    MCH 31.0 26.0 - 34.0 pg    MCHC 31.3 (L) 32.0 - 36.0 g/dL    RDW 18.8 (H) 11.5 - 14.5 %    Platelets 19 (LL) 150 - 450 x10*3/uL    Neutrophils % 92.9 40.0 - 80.0 %    Immature Granulocytes %, Automated 1.6 (H) 0.0 - 0.9 %    Lymphocytes % 1.0 13.0 - 44.0 %    Monocytes % 4.3 2.0 - 10.0 %    Eosinophils % 0.1 0.0 - 6.0 %    Basophils % 0.1 0.0 - 2.0 %    Neutrophils Absolute 16.99 (H) 1.20 - 7.70 x10*3/uL    Immature Granulocytes Absolute, Automated 0.29 0.00 - 0.70 x10*3/uL    Lymphocytes Absolute 0.18 (L) 1.20 - 4.80 x10*3/uL    Monocytes Absolute 0.78 0.10 - 1.00 x10*3/uL    Eosinophils Absolute 0.01 0.00 - 0.70 x10*3/uL    Basophils Absolute 0.02 0.00 - 0.10 x10*3/uL   Blood Gas Arterial Full Panel   Result Value Ref Range    POCT pH, Arterial 7.31 (L) 7.38 - 7.42 pH    POCT pCO2, Arterial 33 (L) 38 - 42 mm Hg    POCT pO2, Arterial 102 (H) 85 - 95 mm  Hg    POCT SO2, Arterial 99 94 - 100 %    POCT Oxy Hemoglobin, Arterial 95.5 94.0 - 98.0 %    POCT Hematocrit Calculated, Arterial 25.0 (L) 36.0 - 46.0 %    POCT Sodium, Arterial 130 (L) 136 - 145 mmol/L    POCT Potassium, Arterial 4.8 3.5 - 5.3 mmol/L    POCT Chloride, Arterial 101 98 - 107 mmol/L    POCT Ionized Calcium, Arterial 1.19 1.10 - 1.33 mmol/L    POCT Glucose, Arterial 146 (H) 74 - 99 mg/dL    POCT Lactate, Arterial 7.4 (HH) 0.4 - 2.0 mmol/L    POCT Base Excess, Arterial -8.8 (L) -2.0 - 3.0 mmol/L    POCT HCO3 Calculated, Arterial 16.6 (L) 22.0 - 26.0 mmol/L    POCT Hemoglobin, Arterial 8.2 (L) 12.0 - 16.0 g/dL    POCT Anion Gap, Arterial 17 10 - 25 mmo/L    Patient Temperature 37.0 degrees Celsius    FiO2 30 %   POCT GLUCOSE   Result Value Ref Range    POCT Glucose 38 (L) 74 - 99 mg/dL   POCT GLUCOSE   Result Value Ref Range    POCT Glucose 156 (H) 74 - 99 mg/dL   Blood Gas Arterial Full Panel   Result Value Ref Range    POCT pH, Arterial 7.25 (LL) 7.38 - 7.42 pH    POCT pCO2, Arterial 30 (L) 38 - 42 mm Hg    POCT pO2, Arterial 114 (H) 85 - 95 mm Hg    POCT SO2, Arterial 99 94 - 100 %    POCT Oxy Hemoglobin, Arterial 95.7 94.0 - 98.0 %    POCT Hematocrit Calculated, Arterial 24.0 (L) 36.0 - 46.0 %    POCT Sodium, Arterial 130 (L) 136 - 145 mmol/L    POCT Potassium, Arterial 4.7 3.5 - 5.3 mmol/L    POCT Chloride, Arterial 102 98 - 107 mmol/L    POCT Ionized Calcium, Arterial 1.16 1.10 - 1.33 mmol/L    POCT Glucose, Arterial 163 (H) 74 - 99 mg/dL    POCT Lactate, Arterial 8.8 (HH) 0.4 - 2.0 mmol/L    POCT Base Excess, Arterial -12.8 (L) -2.0 - 3.0 mmol/L    POCT HCO3 Calculated, Arterial 13.2 (L) 22.0 - 26.0 mmol/L    POCT Hemoglobin, Arterial 8.1 (L) 12.0 - 16.0 g/dL    POCT Anion Gap, Arterial 20 10 - 25 mmo/L    Patient Temperature 37.0 degrees Celsius    FiO2 30 %   Comprehensive Metabolic Panel   Result Value Ref Range    Glucose 173 (H) 74 - 99 mg/dL    Sodium 138 136 - 145 mmol/L    Potassium 4.4  3.5 - 5.3 mmol/L    Chloride 102 98 - 107 mmol/L    Bicarbonate 15 (L) 21 - 32 mmol/L    Anion Gap 25 (H) 10 - 20 mmol/L    Urea Nitrogen 22 6 - 23 mg/dL    Creatinine 2.77 (H) 0.50 - 1.05 mg/dL    eGFR 18 (L) >60 mL/min/1.73m*2    Calcium 7.9 (L) 8.6 - 10.6 mg/dL    Albumin 2.2 (L) 3.4 - 5.0 g/dL    Alkaline Phosphatase 74 33 - 136 U/L    Total Protein 3.6 (L) 6.4 - 8.2 g/dL     (H) 9 - 39 U/L    Bilirubin, Total 2.8 (H) 0.0 - 1.2 mg/dL     (H) 7 - 45 U/L   Bilirubin, Direct   Result Value Ref Range    Bilirubin, Direct 1.4 (H) 0.0 - 0.3 mg/dL   Blood Gas Arterial Full Panel   Result Value Ref Range    POCT pH, Arterial 7.20 (LL) 7.38 - 7.42 pH    POCT pCO2, Arterial 28 (L) 38 - 42 mm Hg    POCT pO2, Arterial 103 (H) 85 - 95 mm Hg    POCT SO2, Arterial 98 94 - 100 %    POCT Oxy Hemoglobin, Arterial 94.7 94.0 - 98.0 %    POCT Hematocrit Calculated, Arterial 23.0 (L) 36.0 - 46.0 %    POCT Sodium, Arterial 133 (L) 136 - 145 mmol/L    POCT Potassium, Arterial 4.4 3.5 - 5.3 mmol/L    POCT Chloride, Arterial 102 98 - 107 mmol/L    POCT Ionized Calcium, Arterial 1.11 1.10 - 1.33 mmol/L    POCT Glucose, Arterial 163 (H) 74 - 99 mg/dL    POCT Lactate, Arterial 12.6 (HH) 0.4 - 2.0 mmol/L    POCT Base Excess, Arterial -15.7 (L) -2.0 - 3.0 mmol/L    POCT HCO3 Calculated, Arterial 10.9 (L) 22.0 - 26.0 mmol/L    POCT Hemoglobin, Arterial 7.6 (L) 12.0 - 16.0 g/dL    POCT Anion Gap, Arterial 25 10 - 25 mmo/L    Patient Temperature 37.0 degrees Celsius    FiO2 30 %   Vancomycin   Result Value Ref Range    Vancomycin 14.8 5.0 - 20.0 ug/mL   CBC and Auto Differential   Result Value Ref Range    WBC 21.8 (H) 4.4 - 11.3 x10*3/uL    nRBC 2.0 (H) 0.0 - 0.0 /100 WBCs    RBC 2.28 (L) 4.00 - 5.20 x10*6/uL    Hemoglobin 7.3 (L) 12.0 - 16.0 g/dL    Hematocrit 22.9 (L) 36.0 - 46.0 %     80 - 100 fL    MCH 32.0 26.0 - 34.0 pg    MCHC 31.9 (L) 32.0 - 36.0 g/dL    RDW 19.4 (H) 11.5 - 14.5 %    Platelets 15 (LL) 150 - 450  x10*3/uL    Neutrophils % 92.0 40.0 - 80.0 %    Immature Granulocytes %, Automated 2.0 (H) 0.0 - 0.9 %    Lymphocytes % 0.6 13.0 - 44.0 %    Monocytes % 5.3 2.0 - 10.0 %    Eosinophils % 0.0 0.0 - 6.0 %    Basophils % 0.1 0.0 - 2.0 %    Neutrophils Absolute 20.02 (H) 1.20 - 7.70 x10*3/uL    Immature Granulocytes Absolute, Automated 0.43 0.00 - 0.70 x10*3/uL    Lymphocytes Absolute 0.14 (L) 1.20 - 4.80 x10*3/uL    Monocytes Absolute 1.16 (H) 0.10 - 1.00 x10*3/uL    Eosinophils Absolute 0.01 0.00 - 0.70 x10*3/uL    Basophils Absolute 0.02 0.00 - 0.10 x10*3/uL   Magnesium   Result Value Ref Range    Magnesium 1.75 1.60 - 2.40 mg/dL   Type And Screen   Result Value Ref Range    ABO TYPE B     Rh TYPE NEG     ANTIBODY SCREEN NEG    Coagulation Screen   Result Value Ref Range    Protime 25.6 (H) 9.8 - 12.8 seconds    INR 2.3 (H) 0.9 - 1.1    aPTT 33 27 - 38 seconds   Phosphorus   Result Value Ref Range    Phosphorus 4.7 2.5 - 4.9 mg/dL   POCT GLUCOSE   Result Value Ref Range    POCT Glucose 175 (H) 74 - 99 mg/dL   BLOOD GAS ARTERIAL FULL PANEL   Result Value Ref Range    POCT pH, Arterial 7.25 (LL) 7.38 - 7.42 pH    POCT pCO2, Arterial 32 (L) 38 - 42 mm Hg    POCT pO2, Arterial 102 (H) 85 - 95 mm Hg    POCT SO2, Arterial 98 94 - 100 %    POCT Oxy Hemoglobin, Arterial 94.9 94.0 - 98.0 %    POCT Hematocrit Calculated, Arterial 23.0 (L) 36.0 - 46.0 %    POCT Sodium, Arterial 132 (L) 136 - 145 mmol/L    POCT Potassium, Arterial 4.4 3.5 - 5.3 mmol/L    POCT Chloride, Arterial 101 98 - 107 mmol/L    POCT Ionized Calcium, Arterial 1.13 1.10 - 1.33 mmol/L    POCT Glucose, Arterial 177 (H) 74 - 99 mg/dL    POCT Lactate, Arterial 11.3 (HH) 0.4 - 2.0 mmol/L    POCT Base Excess, Arterial -12.1 (L) -2.0 - 3.0 mmol/L    POCT HCO3 Calculated, Arterial 14.0 (L) 22.0 - 26.0 mmol/L    POCT Hemoglobin, Arterial 7.6 (L) 12.0 - 16.0 g/dL    POCT Anion Gap, Arterial 21 10 - 25 mmo/L    Patient Temperature 37.0 degrees Celsius    FiO2 30 %    Blood Gas Arterial Full Panel   Result Value Ref Range    POCT pH, Arterial 7.27 (L) 7.38 - 7.42 pH    POCT pCO2, Arterial 31 (L) 38 - 42 mm Hg    POCT pO2, Arterial 109 (H) 85 - 95 mm Hg    POCT SO2, Arterial 100 94 - 100 %    POCT Oxy Hemoglobin, Arterial 96.2 94.0 - 98.0 %    POCT Hematocrit Calculated, Arterial 22.0 (L) 36.0 - 46.0 %    POCT Sodium, Arterial 132 (L) 136 - 145 mmol/L    POCT Potassium, Arterial 4.4 3.5 - 5.3 mmol/L    POCT Chloride, Arterial 102 98 - 107 mmol/L    POCT Ionized Calcium, Arterial 1.11 1.10 - 1.33 mmol/L    POCT Glucose, Arterial 180 (H) 74 - 99 mg/dL    POCT Lactate, Arterial 10.0 (HH) 0.4 - 2.0 mmol/L    POCT Base Excess, Arterial -11.6 (L) -2.0 - 3.0 mmol/L    POCT HCO3 Calculated, Arterial 14.2 (L) 22.0 - 26.0 mmol/L    POCT Hemoglobin, Arterial 7.2 (L) 12.0 - 16.0 g/dL    POCT Anion Gap, Arterial 20 10 - 25 mmo/L    Patient Temperature 37.0 degrees Celsius    FiO2 30 %   POCT GLUCOSE   Result Value Ref Range    POCT Glucose 156 (H) 74 - 99 mg/dL   Blood Gas Arterial Full Panel   Result Value Ref Range    POCT pH, Arterial 7.30 (L) 7.38 - 7.42 pH    POCT pCO2, Arterial 33 (L) 38 - 42 mm Hg    POCT pO2, Arterial 104 (H) 85 - 95 mm Hg    POCT SO2, Arterial 99 94 - 100 %    POCT Oxy Hemoglobin, Arterial 96.1 94.0 - 98.0 %    POCT Hematocrit Calculated, Arterial 19.0 (L) 36.0 - 46.0 %    POCT Sodium, Arterial 131 (L) 136 - 145 mmol/L    POCT Potassium, Arterial 4.6 3.5 - 5.3 mmol/L    POCT Chloride, Arterial 100 98 - 107 mmol/L    POCT Ionized Calcium, Arterial 1.19 1.10 - 1.33 mmol/L    POCT Glucose, Arterial 204 (H) 74 - 99 mg/dL    POCT Lactate, Arterial 10.2 (HH) 0.4 - 2.0 mmol/L    POCT Base Excess, Arterial -9.4 (L) -2.0 - 3.0 mmol/L    POCT HCO3 Calculated, Arterial 16.2 (L) 22.0 - 26.0 mmol/L    POCT Hemoglobin, Arterial 6.4 (LL) 12.0 - 16.0 g/dL    POCT Anion Gap, Arterial 19 10 - 25 mmo/L    Patient Temperature 37.0 degrees Celsius    FiO2 30 %   BLOOD GAS MIXED VENOUS  FULL PANEL   Result Value Ref Range    POCT pH, Mixed 7.28 (L) 7.33 - 7.43 pH    POCT pCO2, Mixed 38 (L) 41 - 51 mm Hg    POCT pO2, Mixed 45 35 - 45 mm Hg    POCT SO2, Mixed 76 (H) 45 - 75 %    POCT Oxy Hemoglobin, Mixed 73.7 45.0 - 75.0 %    POCT Hematocrit Calculated, Mixed 23.0 (L) 36.0 - 46.0 %    POCT Sodium, Mixed 130 (L) 136 - 145 mmol/L    POCT Potassium, Mixed 4.6 3.5 - 5.3 mmol/L    POCT Chloride, Mixed 99 98 - 107 mmol/L    POCT Ionized Calcium, Mixed 1.16 1.10 - 1.33 mmol/L    POCT Glucose, Mixed 199 (H) 74 - 99 mg/dL    POCT Lactate, Mixed 9.6 (HH) 0.4 - 2.0 mmol/L    POCT Base Excess, Mixed -8.2 (L) -2.0 - 3.0 mmol/L    POCT HCO3 Calculated, Mixed 17.9 (L) 22.0 - 26.0 mmol/L    POCT Hemoglobin, Mixed 7.6 (L) 12.0 - 16.0 g/dL    POCT Anion Gap, Mixed 18 10 - 25 mmo/L    Patient Temperature 37.0 degrees Celsius    FiO2 30 %       XR abdomen 1 view    Result Date: 11/9/2024  STUDY: XR ABDOMEN 1 VIEW;  11/9/2024 3:26 am   INDICATION: Signs/Symptoms:concern for bowel obstruction.   COMPARISON: Chest x-ray 11/08/2024.   ACCESSION NUMBER(S): [id number]   ORDERING CLINICIAN: THUY PATTERSON   FINDINGS: Enteric tube courses midline below the level of the diaphragm with the tip projecting over the gastric body.  Nonobstructive bowel gas pattern. There is a oral contrast visualized to the level of the rectum. Moderate colonic stool burden. No gross evidence of free air is noted. Residual degree of contrast layering within the dependent portions of the stomach.   Visualized lungs are clear.   Osseous structures demonstrate no acute bony changes.       1.  Nonobstructive bowel-gas pattern with oral contrast visualized to the level of the rectum. Moderate colonic stool burden.   I personally reviewed the images/study and I agree with the findings as stated by resident Marco High. This study was interpreted at [facility], [place], [place].   MACRO: None     Dictation  workstation:   [id number]    XR chest 1 view    Result Date: 11/8/2024  STUDY: XR CHEST 1 VIEW;  11/8/2024 10:42 pm   INDICATION: Signs/Symptoms:rising lactate.   COMPARISON: Chest x-ray 11/08/2024, 8:59 a.m..   ACCESSION NUMBER(S): [id number]   ORDERING CLINICIAN: THUY PATTERSON   FINDINGS: AP radiograph of the chest was provided.   Endotracheal tube 2.8 cm above the jean-paul. Left internal jugular central venous catheter tip projects over the expected location of the mid SVC.   CARDIOMEDIASTINAL SILHOUETTE: Cardiomediastinal silhouette is enlarged but stable in size and configuration.   LUNGS: Low lung volumes contributing to bronchovascular crowding. Similar degree of perihilar and bibasilar interstitial prominence. No new focal airspace disease. No evidence of pneumothorax. Obscuration of the left costophrenic angle due to cardiomegaly/collimation with questionable trace left-sided pleural effusion.   ABDOMEN: No remarkable upper abdominal findings.   BONES: No acute osseous changes.       1.  No significant interval change with similar low lung volumes and perihilar/bibasilar interstitial prominence. No new airspace disease. 2. Obscuration of the left costophrenic angle with questionable trace left-sided pleural effusion. 3. Medical devices as above.   I personally reviewed the images/study and I agree with the findings as stated by resident Marco High. This study was interpreted at [place], [place].   MACRO: None     Dictation workstation:   [id number]    XR chest 1 view    Result Date: 11/8/2024  Interpreted By:  Tereso Hampton and Omar Mahmoud STUDY: XR CHEST 1 VIEW;  11/8/2024 8:59 am   INDICATION: Signs/Symptoms:intubated.     Patient underwent TAVR on 11/05/2024   COMPARISON: Chest x-ray 11/07/2024   ACCESSION NUMBER(S): [id number]   ORDERING CLINICIAN: THUY PATTERSON   FINDINGS: AP radiograph of the chest was provided.   Patient is mildly  rotated to the right which limits assessment and comparison.   Endotracheal tube tip projects 3.6 cm from jean-paul. Status post TAVR. Esophageal temperature probe. Enteric tube tip projects beyond the field of view. Left IJ CVC tip projects over the expected location of the mid superior vena cava.   CARDIOMEDIASTINAL SILHOUETTE: The heart is moderately enlarged but stable in comparison to previous study. Scratch   LUNGS: Hypoexpanded lungs with associated bronchovascular crowding. Perihilar and bibasilar interstitial prominence, similar as compared to prior. There is no new focal consolidation. There is no pneumothorax. The right costophrenic angle is incompletely visualized but appears clear. The left costophrenic angle is obscured by the cardiomediastinal silhouette.   ABDOMEN: No remarkable upper abdominal findings.   BONES: No acute osseous changes.       1. Stable pulmonary aeration. 2. Medical devices as described above.   I personally reviewed the images/study and I agree with the findings as stated by Bekah Salinas MD (PGY-2). This study was interpreted at [place], [place].   MACRO: None   Signed by: Tereso Hampton 11/8/2024 10:06 AM Dictation workstation:   [id number]    XR chest 1 view    Result Date: 11/8/2024  Interpreted By:  Tereso Hampton, STUDY: XR CHEST 1 VIEW;  11/7/2024 4:17 pm   INDICATION: Signs/Symptoms:post-pacemaker.   COMPARISON: Chest radiograph dated 11/7/2024, 7:21 a.m.   ACCESSION NUMBER(S): [id number]   ORDERING CLINICIAN: PRIMO LUJAN   FINDINGS: AP radiograph of the chest   The endotracheal tube is proximally 2.3 cm above the jean-paul. TAVR is noted. The left internal jugular catheter is positioned within superior vena cava. The enteric tube traverses the esophagus.   There is moderate cardiomegaly which is stable in comparison to the prior study.   The left diaphragm and left ventricular apex are obscured. Pulmonary aeration is similar previous  study.       1. Pulmonary aeration is similar to previous study.       Signed by: Tereso Hampton 11/8/2024 7:31 AM Dictation workstation:   [id number]    Electrophysiology procedure    Result Date: 11/7/2024  Leadless pacemaker implantation Procedures: Leadless PPM Implant (24251), Ultrasound Guided vascular access (22680), Placement of temporary dialysis catheter (82260), Insert temporary transvenous pacing electrode (16938) Patient history: Please refer to the detailed history and physical on the patient's medical chart. Procedure narrative: The patient was in the fasting state. A baseline ECG was recorded. The patient was set up for continuous monitoring of surface 12 lead ECG and pulse oximetry. Blood pressure was monitored with automatic cuff measurements. The procedure was performed under IV conscious sedation supplemented with intermittent moderate sedation. Bilateral groins were clipped, prepped, and draped in the usual sterile fashion. The Right common femoral vein was evaluated with ultrasound, it was normal in size and anatomy.  The vein was accessed x2 using ultrasound guidance and a 18G Cook needle, with direct visualization of the needle at all times. Images were saved for the both vein access. A 8F and 6F sheath were advanced into the vein. Through the 6F sheath, a quadrapolar pacing catheter was inserted into the RV and appropriate capture and sensing parameters were met. Two Abbott Perclose devices were inserted and prepared with a Preclose technique. A long stiff Amplatz wire was advanced through the existing 8F sheath upto the SVC. The existing sheath was removed. Using a 20F akash dilator, the venotomy was dilated upto 20F. 0.1 mg/kg of IV bivalirudin given the clinical concern for possible HIT. The procedure was performed with a heparin-free environment. A 23F Medtronic introducer sheath was then advanced over the wire. The dilator was removed. The sheath was aspirated and connected to flush line  with saline Under fluoroscopic guidance, the leadless pacemaker was advanced to the heart. With GANT and SAHIL fluoroscopy, the pacemaker was advanced to the midseptum. Contrast venography was also perfromed through the sheath to confirm mid-apical septal location. Appropriate electrical measurements were obtained. Cine documented appropriate deployment of the leadless pacemaker. A tug-test was done at this time, under Cine fluoroscopy 3/4 tines were noted to flex with the tug. The existing balloon-tipped temporary pacing catheter was removed from the LIJ sheath and the quadrapolar pacing catheter was removed from the right femoral sheath under fluoroscopy and the Micra position was not affected. At this time the anchor suture was cut, and removed under fluoroscopy. The delivery sheath was then removed. Electrical measurements were assessed and were appropriate. The sheath was then removed. Hemostasis was achieved with deployment of the 2 Perclose devices. Manual pressure was applied. The existing LIJ sheath was cleaned with betadine and chlorhexidine. A 75F Amplatz wire was introduced into the sheath and advanced to the RA. The sheath was removed, an incision was made adjacent to the wire, the tract was sequentially dilated with the two dilators in the 20cm Trialysis kit, and the Trialysis catheter was introduced over the wire to the SVC. The Amplatz wire was removed and the catheter was sutured in place and a CHG dressing was applied over the catheter. Summary: Successful leadless pacemaker implant Medtronic (MICRA AV) Successful exchange over a wire of existing LIJ sehath for 20cm dialysis catheter. RFV access closure: Vascade MVP Final Implant Settings: : Medtronic Device: Leadless pacemaker (Serial #[id number]) Lead Parameters RVent: 720 ohms, R wave 8.0mV, threshold 1.38V@0.24msec Pacing Mode: VVI Lower rate 70bpm  Post implant device parameters scanned into the system Recommendations: Tentatively  patient can be discharged from  perspective after PA-lateral chest xray and device interrogation are done and provided the recovery parameters are appropriate. Bedrest for 3hours post-sheath removal A PA-lateral chest X-ray should be performed and telemetry monitoring continued for 24 hours. A 12 lead ECG should be performed prior to discharge from the hospital. The patient should continue with the present medications. Routine dialysis catheter care See complete procedural log and parameters.     Vascular US Lower Extremity Pseudoaneurysm Evaluation Duplex Left    Result Date: 11/7/2024            [place] [address]   Tel [phone] and Fax [phone]  Vascular Lab Report [facility] [place] LOWER EXTREMITY PSEUDOANEURYSM DUPLEX LEFT  Patient Name:      JUDY SAGASTUME       Reading Physician:  [id number] Vaughn Castillo DO Study Date:        11/7/2024            Ordering Physician: [id number] THUY PATTERSON MRN/PID:           [id number]             Technologist:       Sandoval Santana RVT [facility] Accession#:        [id number]         Technologist 2: Date of Birth/Age: 1955 / 69 years Encounter#:         [id number] Gender:            F Admission Status:  Inpatient            Location Performed: [facility]  Diagnosis/ICD: Atherosclerosis of other arteries-I70.8 CPT Codes:     22759 Peripheral artery Lower arterial Duplex limited  Patient History R/O Pseudo left.  **CRITICAL RESULT** Critical Result: Lt DEIA psesudo Notification called to Sr. Patterson on 11/7/2024 at 9:50:00 AM by HP at bedside.  CONCLUSIONS: Left Lower Arterial: The pseudoaneurism is noted to measures aprox. 1.6x1.3x2.5cm. 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 No evidence of  DVT in the visualized vessels.  Imaging & Doppler Findings:  Right       Left  PSV        PSV        cm/s       CFA 88 cm/s       SFA 81 cm/s       PFA 39 cm/s  Left Pseudo Aneurysm                     Diam Left Pseudoaneurysm 2.5 cm  [id number] Vaughn Castillo DO Electronically signed by [id number] Vaughn Castillo DO on 11/7/2024 at 1:00:21 PM  ** Final **        Assessment & Plan  Rhinovirus    S/P TAVR (transcatheter aortic valve replacement)    Complete heart block    Cardiac arrest    ESRD (end stage renal disease) (Multi)    Hematoma    Acute systolic HF (heart failure)    A-fib (Multi)    Aspiration pneumonia (Multi)    Coronary artery disease    Acute hypoxic respiratory failure (Multi)    Thrombocytopenia (CMS-HCC)    Hyperkalemia    Anemia of chronic disease    Cellulitis    Type 2 diabetes mellitus        Assessment/Plan:  Faviola Pleitez is a 69 y.o. female with PMHx of diabetic ESRD on T/Th/S HD with left arm fistula, CAD, s/p PCI to LAD in 2015, then s/p PCI to LM and mid and proximal LAD with double stenting 11/01/2024, lymphedema c/b recurrent cellulitis of LE, AoCD, CHRF of unclear etiology (on 3L NC baseline), DM2, HLD, HTN, recently diagnosed HFmrEF at 31% and severe AS, and afib, on Eliquis. She is now s/p successful TAVR with Evolut FX+ 29 mm 11/5. Transferred to CICU for post-procedural management. Pt with improved AV conduction on 11/6 with no requirement of TVP pacing for last 24 hours after TAVR procedure. TVP removed 11/6 without issue, however shortly thereafter pt developed afib w/ complete heart block and no ventricular escape rhythm leading to cardiac arrest. Pt coded three times in the span of about 45 minutes (approx 3-5 min per code), ultimately requiring transcutaneous pacing after ROSC achieved and then emergent TVP placement in suite with EP. Pt returned from TVP and within 30 min lost capture & had PEA arrest for about 5 minutes with ROSC achieved. Pt with worsening mental status and c/f  aspiration therefore intubated. 11/6 evening pt noted to have enlarging L groin w/ c/f hematoma. LLE ultrasound ultimately confirmed L fem pseudoaneurysm w/ hematoma. 11/7 don ppm placed with trialysis placed LIJ and started on CVVH. 11/7 overnight pt hypothermic, started on bear hugger, warmed CVVH fluids, and heat packs. 11/8 overnight patient had uptrending lactic, and was given 1.5L fluids, and bedside TTE was done with concern for AI. Gave patient dobutamine with patient not tolerating at all, had to stop after 3 min. Was given epi, but in the morning was just on levo and vaso with fentanyl. Shock is likely septic and patient is on broad-spectrum abx, monitoring blood cultures    Updates 11/09/24:  Levo 0.27, vaso 0.06, fent/midaz  VCAC 30%/430/14/10  Changed Micra setting to 90 bpm for HR - increase CO  Gave 1.5L fluids, giving additional 500cc LR today  [] TTE to evaluate for Aortic valve  [] CTA Chest, A/P to determine etiology of lactic acidosis  [] SAT/SBT with no plans for extubation today given continued 2 pressor requirement  [] Continue stress dosed steroid solucortef 50mg q6h   [] Abx: plan for 7-10 day course    [] Karen 11/7 - * (ordered 11/6 but didn't come up until 11/7)   [] Vanco 11/5 - *  [] Running CVVH net even  [] C/t wean pressors, goal MAP >65  [] CTM thrombocytopenia: plt 15 now but no bleeding, and PF4 negative  [] CBC BID, ABG full panel q4h, T&S q72hr  [] Restart dvt ppx when able (ultimately will need to be on full AC for Afib) - holding due to plts  [] Address pseudoaneurysm  [] Meds holding: amiodarone, phoslo    NEURO:  #Pain control  # Intubated & sedated  - PRN Tylenol  - continue fentanyl gtt for sedation and pain control     CARDS:  #Severe AS, s/p successful TAVR with Evolut FX+ 29 mm 11/5  #Complete heart block  TTE 10/29/24: EF 31%, GHK, reduced RV systolic function, LA mildly dilated, mod mitral annular calcification, mod elevated RVSP, Severe calcific AS with gradients of  64/38.4mmHg and DI of 0.19 and mild AI. ( Note the gradients and VTI were averaged over 5 consecutive beats given atrial fibrillation ) consistent with severe aortic stenosis, severe AV cusp calc, mild AR. S/p successful TAVR with Evolut FX+ 29 mm 11/5. Left CFV TVP placed as well for transient heart block. Pre LVEDP: 20 mmHg. Pst LVEDP:  22 mmHg. Post gradient TTE: 5 mmHg.  - Repeat TTE 11/6 - LV EF low normal at 55%, mildly enlarged RV, low normal RVSF, Evolut transcatheter aortic valve bioprosthesis  - 11/7: Micra PPM placed with success, will monitor. Went from right groin. Placed trialysis line for CVVH   - Did not use Heparin given concern for HIT - used bivalirudin  - 11/9: Went up on Micra setting to 90bpm to help with CO     #New HFmrEF, 31%  #Acute systolic and diastolic heart failure, decompensated  #CAD s/p PCI, 11/1/24  TTE report as above - global hypokinesis. BNP 1284 on admission. [facility] 11/1: Culprit vessel(s): left anterior descending. OCT guided successful PCI to LM and mid and proximal LAD with double stenting. Successful DCB for in stent restenosis of LAD stents. Lcx 50% ostial stenosis.  - New HF workup ordered 11/5              > Likely ICM  - Volume management with HD - nephrology managing  - GDMT limited due to ESRD on HD  - DAPT with ASA and stain  - Off AC given high risk of bleed with pseudoaneurysm and low platelets and evaluating for HIT  - Atorva 40 mg daily     #Newly diagnosed afib  10/25/24 at OSH.  - Holding anticoagulation given pseudoaneurysm, low platelets, risk of HIT  - Amiodarone 200mg daily    #CARDIAC ARREST - 11/6  TVP was removed around Noon for patient to be stepped down. After pulling the TVP, patient was okay but after 1 min she went into asystole. Compressed patient for 6-8 minutes and gave one dose of epinephrine. Was transcutaneously paced, and emergently taken to cath lab for replacement of TVP.  After placement of recurrent TVP patient lost capture after coming to  "the CICU and gave 2 compressions. She also became hypotensive and then dropped her pressures for which levophed and dopamine were started. She was intubated as she was vomiting fluids. Arterial line was placed and thereafter patient started to develop a hematoma in the groin for which pressure was applied. Monitoring for hematoma expansion.  - Continue levophed, vasopressin drips to titrate MAP>65  - Continue Fentanyl and versed drips  - Family updated and discussed care with them     PULM  #CHRF, unclear etiology, possible COPD?  #Rhinovirus positive at OSH, resolved  #Pulmonary nodules, incidental finding  CT TAVR: Multiple solid non-calcified pulmonary nodules measuring up to 7 mm. In absence of prior comparative examinations, to ensure stability, consider follow up non contrast chest CT at 3-6 months. Completed prednisone taper per OSH recs during this [place] admission.  - currently intubated - AC/VC 30%/430/14/10  - Breo-Ellipta daily     NEPHRO:  #Hyperkalemia  #Diabetic ESRD on HD T, Th, Sat via left arm fistula  S/p HD 10/29, 10/31, 11/2, 11/4.  -discontinued Midodrine 5 mg TID  -on pressor support  -c/w CVVH     HEME:  # Thrombocytopenia  - Plt 125 on admission, downtrended since admit  - Ddx KARLEY vs 2/2 critical illness  - PF4 negative  [] Plt >10 if not bleeding, >50 if bleeding >100 if CNS bleeding  [] Maintain active T&S - last on 11/9    #Anemia of chronic disease  Hemoglobin 7.3 s/p TAVR now  - CTM     ID:  #Acute Cellulitis  #Poor nail integrity  #Hx lymphedema  Appears to have completed a course of Levaquin through 10/30 (initiated at OSH), and also \"recently completed metronidazole course,\" again, at OSH. No culture data. Ongoing cellulitis on exam 11/5, with bullae.  - Vancomycin started 11/6  - Wound care following - recommendations of daily cleaning at this time  - Consider podiatry consult    #Concern for Sepsis  - empiric abx coverage - vancomycin and meropenam  - blood culture drawn 11/7 NGTD at " 1 day  [] Abx: plan for 7-10 day course    [] Karen 11/7 - * (ordered 11/6 but didn't come up until 11/7)   [] Vanco 11/5 - *    ENDO:  #DM2  A1c 7 in August. Repeat 11/5 of 7.3  [] SSI q4h #2     DVT prophylaxis: SCDs     Access: R rad art line 11/7, LIJ trialysis 11/7  Gtt: levo 0.27, vaso 0.06, fent, amio  Tubes/drains: Nathan, OG  O2: VCAC 30%/430/14/10  Diet: NPO  GI ppx: PPI  DVT ppx: SCDs, holding chemical ppx 2/2 plt 15    Code Status: DNR - changed by family 11/8  NOK: Extended Emergency Contact Information  Primary Emergency Contact: Susie Sagastume - primary decision maker  Mobile Phone: [phone]  Relation: Daughter  Secondary Emergency Contact: SINDHU SAGASTUME - give updates to  Mobile Phone: [phone]  Relation: Valentino      Dameon Rondon MD  [facility]/ Internal Medicine PGY-2

## 2024-11-09 NOTE — PROGRESS NOTES
Vancomycin Dosing by Pharmacy- FOLLOW UP    Faviola Pleitez is a 69 y.o. year old female who Pharmacy has been consulted for vancomycin dosing for cellulitis, skin and soft tissue. Based on the patient's indication and renal status this patient is being dosed based on a goal trough/random level of 15-20.     Renal function is currently declining.    Current vancomycin dose: 2000 mg given every 24 hours x 1 dose    Most recent trough level: 14 mcg/mL    Visit Vitals  BP (!) 121/43   Pulse 90   Temp 36.1 °C (97 °F) (Temporal)   Resp 16        Lab Results   Component Value Date    CREATININE 2.77 (H) 2024    CREATININE 2.53 (H) 2024    CREATININE 2.75 (H) 2024    CREATININE 3.15 (H) 2024        Patient weight is as follows:   Vitals:    24 0600   Weight: 136 kg (300 lb)       Cultures:  No results found for the encounter in last 14 days.       I/O last 3 completed shifts:  In: 4635.9 (34.1 mL/kg) [I.V.:3215.9 (23.6 mL/kg); NG/GT:220; IV Piggyback:1200]  Out: 852 (6.3 mL/kg) [Other:852]  Weight: 136.1 kg   I/O during current shift:  No intake/output data recorded.    Temp (24hrs), Av °C (96.8 °F), Min:33.8 °C (92.8 °F), Max:37.1 °C (98.8 °F)      Assessment/Plan    Within goal random/trough level    This dosing regimen is predicted by [employer] to result in the following pharmacokinetic parameters:  Pt on CVVH, scr increasing; will order 750mg x 1 then check labs 11/10    The next level will be obtained on 11/10 at 0400. May be obtained sooner if clinically indicated.   Will continue to monitor renal function daily while on vancomycin and order serum creatinine at least every 48 hours if not already ordered.  Follow for continued vancomycin needs, clinical response, and signs/symptoms of toxicity.       Bettie Roa, PharmD

## 2024-11-10 VITALS
DIASTOLIC BLOOD PRESSURE: 39 MMHG | HEIGHT: 64 IN | BODY MASS INDEX: 50.02 KG/M2 | TEMPERATURE: 97 F | WEIGHT: 293 LBS | HEART RATE: 100 BPM | SYSTOLIC BLOOD PRESSURE: 100 MMHG | RESPIRATION RATE: 13 BRPM | OXYGEN SATURATION: 94 %

## 2024-11-10 LAB
ANION GAP BLDA CALCULATED.4IONS-SCNC: 16 MMO/L (ref 10–25)
ANION GAP BLDA CALCULATED.4IONS-SCNC: 19 MMO/L (ref 10–25)
BACTERIA BLD CULT: NORMAL
BACTERIA BLD CULT: NORMAL
BACTERIA SPEC RESP CULT: ABNORMAL
BASE EXCESS BLDA CALC-SCNC: -14.1 MMOL/L (ref -2–3)
BASE EXCESS BLDA CALC-SCNC: -6.7 MMOL/L (ref -2–3)
BLOOD EXPIRATION DATE: NORMAL
BODY TEMPERATURE: 37 DEGREES CELSIUS
BODY TEMPERATURE: 37 DEGREES CELSIUS
CA-I BLD-SCNC: 1.17 MMOL/L (ref 1.1–1.33)
CA-I BLDA-SCNC: 1.16 MMOL/L (ref 1.1–1.33)
CA-I BLDA-SCNC: 1.21 MMOL/L (ref 1.1–1.33)
CHLORIDE BLDA-SCNC: 102 MMOL/L (ref 98–107)
CHLORIDE BLDA-SCNC: 99 MMOL/L (ref 98–107)
DISPENSE STATUS: NORMAL
GLUCOSE BLD MANUAL STRIP-MCNC: 176 MG/DL (ref 74–99)
GLUCOSE BLDA-MCNC: 153 MG/DL (ref 74–99)
GLUCOSE BLDA-MCNC: 176 MG/DL (ref 74–99)
GRAM STN SPEC: ABNORMAL
GRAM STN SPEC: ABNORMAL
HCO3 BLDA-SCNC: 12.3 MMOL/L (ref 22–26)
HCO3 BLDA-SCNC: 18.2 MMOL/L (ref 22–26)
HCT VFR BLD EST: 17 % (ref 36–46)
HCT VFR BLD EST: 22 % (ref 36–46)
HGB BLDA-MCNC: 5.7 G/DL (ref 12–16)
HGB BLDA-MCNC: 7.2 G/DL (ref 12–16)
INHALED O2 CONCENTRATION: 30 %
INHALED O2 CONCENTRATION: 30 %
LACTATE BLDA-SCNC: 11.1 MMOL/L (ref 0.4–2)
LACTATE BLDA-SCNC: 15 MMOL/L (ref 0.4–2)
OXYHGB MFR BLDA: 96.5 % (ref 94–98)
OXYHGB MFR BLDA: 97 % (ref 94–98)
PCO2 BLDA: 30 MM HG (ref 38–42)
PCO2 BLDA: 33 MM HG (ref 38–42)
PH BLDA: 7.22 PH (ref 7.38–7.42)
PH BLDA: 7.35 PH (ref 7.38–7.42)
PO2 BLDA: 120 MM HG (ref 85–95)
PO2 BLDA: 403 MM HG (ref 85–95)
POTASSIUM BLDA-SCNC: 4.1 MMOL/L (ref 3.5–5.3)
POTASSIUM BLDA-SCNC: 4.6 MMOL/L (ref 3.5–5.3)
PRODUCT BLOOD TYPE: 1700
PRODUCT BLOOD TYPE: 6200
PRODUCT CODE: NORMAL
SAO2 % BLDA: 100 % (ref 94–100)
SAO2 % BLDA: 99 % (ref 94–100)
SODIUM BLDA-SCNC: 129 MMOL/L (ref 136–145)
SODIUM BLDA-SCNC: 129 MMOL/L (ref 136–145)
UNIT ABO: NORMAL
UNIT NUMBER: NORMAL
UNIT RH: NORMAL
UNIT VOLUME: 183
UNIT VOLUME: 327
UNIT VOLUME: 350
UNIT VOLUME: 350
XM INTEP: NORMAL

## 2024-11-10 PROCEDURE — 2500000005 HC RX 250 GENERAL PHARMACY W/O HCPCS

## 2024-11-10 PROCEDURE — 84132 ASSAY OF SERUM POTASSIUM: CPT

## 2024-11-10 PROCEDURE — 37799 UNLISTED PX VASCULAR SURGERY: CPT

## 2024-11-10 PROCEDURE — 36430 TRANSFUSION BLD/BLD COMPNT: CPT

## 2024-11-10 PROCEDURE — 71045 X-RAY EXAM CHEST 1 VIEW: CPT | Performed by: RADIOLOGY

## 2024-11-10 PROCEDURE — 94003 VENT MGMT INPAT SUBQ DAY: CPT

## 2024-11-10 PROCEDURE — 2500000004 HC RX 250 GENERAL PHARMACY W/ HCPCS (ALT 636 FOR OP/ED)

## 2024-11-10 PROCEDURE — P9016 RBC LEUKOCYTES REDUCED: HCPCS

## 2024-11-10 PROCEDURE — 2500000002 HC RX 250 W HCPCS SELF ADMINISTERED DRUGS (ALT 637 FOR MEDICARE OP, ALT 636 FOR OP/ED)

## 2024-11-10 PROCEDURE — 82947 ASSAY GLUCOSE BLOOD QUANT: CPT

## 2024-11-10 PROCEDURE — 82330 ASSAY OF CALCIUM: CPT

## 2024-11-10 RX ORDER — EPINEPHRINE IN 0.9 % SOD CHLOR 4MG/250ML
0-.2 PLASTIC BAG, INJECTION (ML) INTRAVENOUS CONTINUOUS
Status: DISCONTINUED | OUTPATIENT
Start: 2024-11-10 | End: 2024-11-10 | Stop reason: HOSPADM

## 2024-11-10 RX ORDER — PHENYLEPHRINE HCL IN 0.9% NACL 0.4MG/10ML
100 SYRINGE (ML) INTRAVENOUS ONCE
Status: COMPLETED | OUTPATIENT
Start: 2024-11-10 | End: 2024-11-10

## 2024-11-10 RX ORDER — EPINEPHRINE 1 MG/ML
INJECTION INTRAMUSCULAR; INTRAVENOUS; SUBCUTANEOUS
Status: COMPLETED
Start: 2024-11-10 | End: 2024-11-10

## 2024-11-10 RX ORDER — CALCIUM CHLORIDE INJECTION 100 MG/ML
INJECTION, SOLUTION INTRAVENOUS
Status: COMPLETED
Start: 2024-11-10 | End: 2024-11-10

## 2024-11-10 RX ORDER — PHENYLEPHRINE 10 MG/250 ML(40 MCG/ML)IN 0.9 % SOD.CHLORIDE INTRAVENOUS
0-2 CONTINUOUS
Status: DISCONTINUED | OUTPATIENT
Start: 2024-11-10 | End: 2024-11-10 | Stop reason: HOSPADM

## 2024-11-10 RX ORDER — CALCIUM GLUCONATE 20 MG/ML
INJECTION, SOLUTION INTRAVENOUS
Status: COMPLETED
Start: 2024-11-10 | End: 2024-11-10

## 2024-11-10 RX ORDER — EPINEPHRINE 0.1 MG/ML
INJECTION INTRACARDIAC; INTRAVENOUS
Status: COMPLETED
Start: 2024-11-10 | End: 2024-11-10

## 2024-11-10 RX ORDER — PHENYLEPHRINE HCL IN 0.9% NACL 0.4MG/10ML
SYRINGE (ML) INTRAVENOUS
Status: COMPLETED
Start: 2024-11-10 | End: 2024-11-10

## 2024-11-10 RX ORDER — SODIUM BICARBONATE 1 MEQ/ML
SYRINGE (ML) INTRAVENOUS
Status: COMPLETED
Start: 2024-11-10 | End: 2024-11-10

## 2024-11-10 NOTE — NURSING NOTE
At 0100 patient started requiring to go up on pressors including epi and levo. Phenyl and Epi were added as patient continued to drop her pressures below systolics of 90s. CRRT filter then clotted, and was subsequently stopped due to using the line to push more fluids and pressors.  At 0210 calcium chloride was pushed, 0212 bicarb, 0216 epi, 0219 calcium, 0219 LR bolus. Patient also received blood and platelets. Family was at bedside while pushing medications. Patient  at 0240 - see MD note

## 2024-11-10 NOTE — PROGRESS NOTES
Subjective Data:  Critical condition. Purplish discoloration of toes bilaterally.     Overnight Events:    Critical condition. Epi drip requirements slightly better. Advised fluid resuscitation- based on low value of CVP. Advise weaning of pressors as tolerated.  Had unsuccessful trials of dobitamine and epinephrine which were not tolerated by patient.      Objective Data:  Last Recorded Vitals:  Vitals:    11/09/24 1900 11/09/24 2000 11/09/24 2100 11/09/24 2108   BP:       BP Location:       Patient Position:       Pulse: 90 94 90    Resp: 13 (!) 7 (!) 6 16   Temp:       TempSrc:       SpO2:  97%     Weight:       Height:           Last Labs:  CBC - 11/9/2024:  5:59 PM  22.0 6.5 8    20.4      CMP - 11/9/2024:  5:59 PM  7.6 3.6 714 --- 2.8   3.4 2.0 687 74      PTT - 11/9/2024:  3:59 AM  2.3   25.6 33     BNP   Date/Time Value Ref Range Status   10/28/2024 09:50 AM 1,284 0 - 99 pg/mL Final     HGBA1C   Date/Time Value Ref Range Status   11/05/2024 12:43 PM 7.3 See comment % Final   08/07/2024 03:16 AM 7.0 4.0 - 5.6 % Final   01/16/2024 04:33 PM 7.4 4.0 - 5.6 % Final     LDLCALC   Date/Time Value Ref Range Status   11/05/2024 12:43 PM 45 <=99 mg/dL Final     Comment:                                 Near   Borderline      AGE      Desirable  Optimal    High     High     Very High     0-19 Y     0 - 109     ---    110-129   >/= 130     ----    20-24 Y     0 - 119     ---    120-159   >/= 160     ----      >24 Y     0 -  99   100-129  130-159   160-189     >/=190       VLDL   Date/Time Value Ref Range Status   11/05/2024 12:43 PM 19 0 - 40 mg/dL Final      Last I/O:  I/O last 3 completed shifts:  In: 6923.4 (50.9 mL/kg) [I.V.:3933.4 (28.9 mL/kg); NG/GT:240; IV Piggyback:2750]  Out: 568 (4.2 mL/kg) [Other:568]  Weight: 136.1 kg       Ejection Fractions:  EF   Date/Time Value Ref Range Status   11/09/2024 11:41 AM 53 %    11/06/2024 04:42 PM 55 %    11/06/2024 08:04 AM 52 %      Inpatient Medications:  Scheduled  medications   Medication Dose Route Frequency    [Held by provider] amiodarone  200 mg oral Daily    [Held by provider] apixaban  5 mg oral BID    aspirin  81 mg oral Daily    atorvastatin  40 mg oral q AM    clopidogrel  75 mg oral Daily    pantoprazole  40 mg oral Daily before breakfast    Or    esomeprazole  40 mg nasoduodenal tube Daily before breakfast    Or    pantoprazole  40 mg intravenous Daily before breakfast    fluticasone furoate-vilanteroL  1 puff inhalation Daily    hydrocortisone sodium succinate  50 mg intravenous q6h    insulin lispro  0-10 Units subcutaneous q4h    [Held by provider] melatonin  6 mg oral Nightly    meropenem  1,000 mg intravenous q12h    oxygen   inhalation Continuous - Inhalation    perflutren lipid microspheres  0.5-10 mL of dilution intravenous Once in imaging    polyethylene glycol  17 g oral Daily    sennosides-docusate sodium  1 tablet oral Nightly    sodium chloride  1,000 mL CRRT Once    vitamin B complex-vitamin C-folic acid  1 capsule oral Daily     PRN medications   Medication    acetaminophen    Or    acetaminophen    Or    acetaminophen    bisacodyl    dextrose    glucagon    glucagon    glucagon HCL    melatonin    midazolam    midazolam    ondansetron    polyethylene glycol    sodium chloride    vancomycin     Continuous Medications   Medication Dose Last Rate    EPINEPHrine  0-0.2 mcg/kg/min Stopped (11/09/24 0550)    fentaNYL   mcg/hr 25 mcg/hr (11/09/24 1732)    midazolam  0-20 mg/hr 1 mg/hr (11/08/24 0400)    norepinephrine  0-0.5 mcg/kg/min 0.3 mcg/kg/min (11/09/24 2017)    PrismaSol BGK 2/3.5  30 mL/kg/hr      sodium bicarbonate 150 mEq in dextrose 5% 1,150 mL infusion  50 mL/hr 50 mL/hr (11/09/24 0800)    vasopressin  0-0.06 Units/min 0.06 Units/min (11/09/24 2037)       Physical Exam:  GEN     Intubated, sedated.  HENT     NCAT,   Eyes      PERRL. Anicteric  NECK     Supple, No JVD, No thyromegaly  CHEST   Intubated, sedated.  HEART   RRR, S1S2+, No  M/R/G  ABD       Soft, NT, ND.  EXT       No clubbing, cyanosis or edema.  NEURO  Intubated, sedated.  Skin       Warm and dry, No rashes       Assessment/Plan   Faviola Pleitez is a 69 y.o. female with PMHx of diabetic ESRD on T/Th/S HD with left arm fistula, CAD, s/p PCI to LAD in 2015, then s/p PCI to LM and mid and proximal LAD with double stenting 11/01/2024, lymphedema c/b recurrent cellulitis of LE, AoCD, CHRF of unclear etiology (on 3L NC baseline), DM2, HLD, HTN, recently diagnosed HFmrEF at 31% and severe AS, and afib, on Eliquis.     She is now s/p successful TAVR with Evolut FX+ 29 mm 11/5. Pt with improved AV conduction on 11/6 with no requirement of TVP pacing for last 24 hours after TAVR procedure. TVP removed 11/6 without issue, however shortly thereafter pt developed afib w/ complete heart block and no ventricular escape rhythm leading to cardiac arrest. Pt coded three times in the span of about 45 minutes (approx 3-5 min per code), ultimately requiring transcutaneous pacing after ROSC achieved and then emergent TVP placement in suite with EP. Pt returned from TVP and within 30 min lost capture & had PEA arrest for about 5 minutes with ROSC achieved. Pt with worsening mental status and c/f aspiration therefore intubated. 11/6 evening pt noted to have enlarging L groin w/ c/f hematoma. LLE ultrasound ultimately confirmed L fem pseudoaneurysm w/ hematoma. 11/7 don ppm placed with trialysis placed LIJ and started on CVVH. 11/7 overnight pt hypothermic, started on bear hugger, warmed CVVH fluids, and heat packs. 11/8 overnight patient had uptrending lactic, and was given 1.5L fluids, and bedside TTE was done with concern for AI. Gave patient dobutamine with patient not tolerating at all, had to stop after 3 min. Was given epi, but in the morning was just on levo and vaso with fentanyl. Shock is likely septic and patient is on broad-spectrum abx, monitoring blood cultures     Plan:   Progressively  worsening condition since yesterday evening.   Family considering comfort care.   Continue with supportive care as per CICU team.            Code Status:  DNR        Richard Steele MD

## 2024-11-10 NOTE — SIGNIFICANT EVENT
Called by RN to pronounce patient.  Telemetry reviewed prior to entering patient room. Asystole with lack of pulsatile flow on monitor > 2 minutes prior to exam.   On exam:  Pt not responsive to verbal or physical stimuli.   On exam, pupils fixed and dilated.  Absent corneal reflexes.  Absent cardiopulmonary sounds, auscultated for >60 seconds.  Absent peripheral pulses, palpated for >60 seconds.      Time of death: 0240    Family made aware.

## 2024-11-11 LAB
ANION GAP BLDA CALCULATED.4IONS-SCNC: 17 MMO/L (ref 10–25)
AORTIC VALVE MEAN GRADIENT: 19 MMHG
AORTIC VALVE PEAK VELOCITY: 2.97 M/S
AV PEAK GRADIENT: 35 MMHG
AVA (PEAK VEL): 1.39 CM2
AVA (VTI): 1.23 CM2
BACTERIA BLD CULT: NORMAL
BACTERIA SPEC RESP CULT: ABNORMAL
BASE EXCESS BLDA CALC-SCNC: -8.9 MMOL/L (ref -2–3)
BODY SURFACE AREA: 2.48 M2
BODY TEMPERATURE: 37 DEGREES CELSIUS
CA-I BLDA-SCNC: 1.23 MMOL/L (ref 1.1–1.33)
CHLORIDE BLDA-SCNC: 101 MMOL/L (ref 98–107)
EJECTION FRACTION APICAL 4 CHAMBER: 37.6
EJECTION FRACTION: 53 %
GLUCOSE BLDA-MCNC: 155 MG/DL (ref 74–99)
GRAM STN SPEC: ABNORMAL
GRAM STN SPEC: ABNORMAL
HCO3 BLDA-SCNC: 16.7 MMOL/L (ref 22–26)
HCT VFR BLD EST: 26 % (ref 36–46)
HGB BLDA-MCNC: 8.5 G/DL (ref 12–16)
INHALED O2 CONCENTRATION: 30 %
LACTATE BLDA-SCNC: 7 MMOL/L (ref 0.4–2)
LEFT VENTRICLE INTERNAL DIMENSION DIASTOLE: 5.2 CM (ref 3.5–6)
LEFT VENTRICULAR OUTFLOW TRACT DIAMETER: 1.9 CM
OXYHGB MFR BLDA: 95.3 % (ref 94–98)
PCO2 BLDA: 34 MM HG (ref 38–42)
PH BLDA: 7.3 PH (ref 7.38–7.42)
PO2 BLDA: 99 MM HG (ref 85–95)
POTASSIUM BLDA-SCNC: 4.8 MMOL/L (ref 3.5–5.3)
RIGHT VENTRICLE PEAK SYSTOLIC PRESSURE: 42.6 MMHG
SAO2 % BLDA: 99 % (ref 94–100)
SODIUM BLDA-SCNC: 130 MMOL/L (ref 136–145)

## 2024-11-11 NOTE — DISCHARGE SUMMARY
Discharge Diagnosis  Patient ; Severe aortic stenosis    Discharge Meds     Medication List      ASK your doctor about these medications     aspirin 81 mg EC tablet   atorvastatin 40 mg tablet; Commonly known as: Lipitor   Basaglar KwikPen U-100 Insulin 100 unit/mL (3 mL) pen; Generic drug:   insulin glargine   calcium acetate 667 mg capsule; Commonly known as: Phoslo   Fiasp FlexTouch U-100 Insulin 100 unit/mL (3 mL) injection; Generic   drug: insulin aspart (with niacinamide)   midodrine 5 mg tablet; Commonly known as: Proamatine       Test Results Pending At Discharge  Pending Labs       Order Current Status    CBC Collected (10/31/24 0228)    Glucose Collected (24)    VERIFY ABO/Rh Group Test Collected (24)    Vancomycin Collected (24)    Aspergillus galactomannan antigen In process    Blood Gas Arterial Full Panel In process    Blood Gas Arterial Full Panel In process    Blood Gas Lactic Acid, Venous In process    Fungitell Beta-D Glucan Serum In process    Blood Culture Preliminary result    Blood culture, peripheral #1 Preliminary result    Respiratory Culture/Smear Preliminary result            Hospital Course  Faviola Pleitez is a 69 y.o. female with PMHx of diabetic ESRD on T//S HD with left arm fistula, CAD, s/p PCI to LAD in , then s/p PCI to LM and mid and proximal LAD with double stenting 2024, lymphedema c/b recurrent cellulitis of LE, AoCD, CHRF of unclear etiology (on 3L NC baseline), DM2, HLD, HTN, recently diagnosed HFmrEF at 31% and severe AS, and afib, on Eliquis. She is now s/p successful TAVR with Evolut FX+ 29 mm .    Admitted 10/22-10/28 [facility] with rhinovirus and viral vs bacterial bronchitis, and was also found to have new onset atrial fibrillation. Managed with Levaquin and steroids, and amiodarone and Eliquis, respectively. Cardiology there felt valves and coronaries needed evaluation and she agreed to transfer to [facility]  10/28.    10/28 - 11/5:  BNP 1,284 on admission. TTE 10/29/24: EF 31%, GHK, reduced RV systolic function, LA mildly dilated, mod mitral annular calcification, mod elevated RVSP, Severe calcific AS with gradients of 64/38.4mmHg and DI of 0.19 and mild AI. ( Note the gradients and VTI were averaged over 5 consecutive beats given atrial fibrillation ) consistent with severe aortic stenosis, severe AV cusp calc, mild AR. [facility] 11/1: Culprit vessel(s): left anterior descending. OCT guided successful PCI to LM and mid and proximal LAD with double stenting. Successful DCB for in stent restenosis of LAD stents. Lcx 50% ostial stenosis. Beyond stenting, underwent workup for TAVR. Does not appear to have had other complications. Completed 3 days of Levaquin 10/28-31 to finish course started for cellulitis at OSH.    CICU Course:  She is now s/p successful TAVR with Evolut FX+ 29 mm 11/5. Left CFV TVP placed for transient heart block. Pre LVEDP: 20 mmHg. Pst LVEDP:  22 mmHg. Post gradient TTE: 5 mmHg. TVP in place, set rate to 50. Started on vancomycin due to concern for ongoing LE cellulitis. On 11/6, she didn't require pacing overnight and after discussing with structural pulled her TVP. Subsequently patient coded to asystole. At this point we started compressions and gave 1 round of epinephrine, achieved ROSC and started pacing transcutaneously. Thereafter, patient was emergently taken to cath lab for placement of TVP again. She was brought back to the CICU and lost beat again for a couple beats due to lost capture. She is currently being paced and after cath lab she was hypotensive. Started patient on levo and dopamine. Arterial line was placed. She was intubated as she was vomiting fluids. Thereafter patient started to develop a hematoma in the groin for which pressure was applied. Monitoring for hematoma expansion which was found to be a pseudoaneurysm. Micra PPM was placed in RV with TVP replaced for a trialysis line.  CVVH was started on the patient. Subsequently on  night patient was trialed on dobutamine as it was thought she had a combination of septic and cardiogenic shock.. Patient did not tolerate this well and had to turn it off after just 3 min. Was given epinephrine pushes and placed on an epi gtt that was later titrated off. Micra settings changed to be set to 90 bpm to increase cardiac output. Despite these measures and also placing patient on broad spectrum abx, patient continued to worsen with increasing lactic acid and worsening limb ischemia. Eventually on 11/10 morning, patient decompensated very quickly. At 0100, pressures were maxed out on epi, levo, phenyl. Patient given calcium chloride, bicarb, epi, calcium, and LR bolus. Also received blood and platelets. Despite these measures patient  at 0240 with family at bedside.        Pertinent Physical Exam At Time of Discharge  Physical Exam:  Pt not responsive to verbal or physical stimuli.   On exam, pupils fixed and dilated.  Absent corneal reflexes.  Absent cardiopulmonary sounds, auscultated for >60 seconds.  Absent peripheral pulses, palpated for >60 seconds.      Outpatient Follow-Up  No future appointments.      Dameon Rondon MD

## 2024-11-12 LAB
ASPERGILLUS GALACTOMANNAN EIA,SERUM: 0.04
FUNGITELL BETA-D GLUCAN,SERUM: 54 PG/ML

## 2024-11-12 NOTE — SIGNIFICANT EVENT
Death Within 24 Hours of Soft Wrist Restraint Utilization    Death within 24 hours of soft wrist restraint logged at 11/12/2024 at 2:55 PM.    Radha Dc RN  Date: 11/12/2024  Time: 2:55 PM

## 2024-11-13 LAB
ATRIAL RATE: 101 BPM
ATRIAL RATE: 39 BPM
ATRIAL RATE: 66 BPM
ATRIAL RATE: 90 BPM
ATRIAL RATE: 91 BPM
BACTERIA BLD CULT: NORMAL
Q ONSET: 182 MS
Q ONSET: 188 MS
Q ONSET: 194 MS
Q ONSET: 209 MS
Q ONSET: 217 MS
QRS COUNT: 12 BEATS
QRS COUNT: 12 BEATS
QRS COUNT: 13 BEATS
QRS COUNT: 13 BEATS
QRS COUNT: 9 BEATS
QRS DURATION: 122 MS
QRS DURATION: 168 MS
QRS DURATION: 178 MS
QRS DURATION: 192 MS
QRS DURATION: 88 MS
QT INTERVAL: 418 MS
QT INTERVAL: 450 MS
QT INTERVAL: 508 MS
QT INTERVAL: 518 MS
QT INTERVAL: 538 MS
QTC CALCULATION(BAZETT): 468 MS
QTC CALCULATION(BAZETT): 482 MS
QTC CALCULATION(BAZETT): 519 MS
QTC CALCULATION(BAZETT): 559 MS
QTC CALCULATION(BAZETT): 581 MS
QTC FREDERICIA: 460 MS
QTC FREDERICIA: 481 MS
QTC FREDERICIA: 495 MS
QTC FREDERICIA: 545 MS
QTC FREDERICIA: 566 MS
R AXIS: -11 DEGREES
R AXIS: -13 DEGREES
R AXIS: -68 DEGREES
R AXIS: -76 DEGREES
R AXIS: 58 DEGREES
T AXIS: 144 DEGREES
T AXIS: 174 DEGREES
T AXIS: 259 DEGREES
T AXIS: 85 DEGREES
T AXIS: 94 DEGREES
T OFFSET: 419 MS
T OFFSET: 426 MS
T OFFSET: 447 MS
T OFFSET: 451 MS
T OFFSET: 463 MS
VENTRICULAR RATE: 51 BPM
VENTRICULAR RATE: 70 BPM
VENTRICULAR RATE: 70 BPM
VENTRICULAR RATE: 80 BPM
VENTRICULAR RATE: 80 BPM

## 2024-11-14 ENCOUNTER — APPOINTMENT (OUTPATIENT)
Dept: CARDIOLOGY | Facility: CLINIC | Age: 69
End: 2024-11-14
Payer: MEDICARE

## 2024-11-14 LAB
ATRIAL RATE: 77 BPM
Q ONSET: 178 MS
QRS COUNT: 11 BEATS
QRS DURATION: 200 MS
QT INTERVAL: 538 MS
QTC CALCULATION(BAZETT): 581 MS
QTC FREDERICIA: 566 MS
R AXIS: -65 DEGREES
T AXIS: 93 DEGREES
T OFFSET: 447 MS
VENTRICULAR RATE: 70 BPM

## 2024-12-06 ENCOUNTER — APPOINTMENT (OUTPATIENT)
Dept: CARDIOLOGY | Facility: CLINIC | Age: 69
End: 2024-12-06
Payer: MEDICARE

## 2024-12-06 ENCOUNTER — APPOINTMENT (OUTPATIENT)
Dept: CARDIOLOGY | Facility: HOSPITAL | Age: 69
End: 2024-12-06
Payer: MEDICARE

## 2024-12-12 ENCOUNTER — APPOINTMENT (OUTPATIENT)
Dept: CARDIOLOGY | Facility: CLINIC | Age: 69
End: 2024-12-12
Payer: MEDICARE

## 2024-12-31 NOTE — PROGRESS NOTES
Pt just had one bowel movement, it was formed and brown color.     Electronically signed by Sven Felix RN on 12/1/2021 at 1:11 AM Home

## 2025-11-06 ENCOUNTER — APPOINTMENT (OUTPATIENT)
Dept: CARDIOLOGY | Facility: CLINIC | Age: 70
End: 2025-11-06
Payer: MEDICARE

## (undated) DEVICE — FORCEPS BX OVL CUP FEN DISPOSABLE CAP L 160CM RAD JAW 4

## (undated) DEVICE — GUIDEWIRE, INQWIRE, 3MM J, .035, 150

## (undated) DEVICE — TRAY VCF/TESIO TRAY  REUSABLE

## (undated) DEVICE — CATHETER, ANGIO, IMPULSE, FR4, 6 FR X 100 CM

## (undated) DEVICE — CATHETER, QUADRAPOLAR, 2-5-2MM, 115CM, YELLOW, W/ REDEL

## (undated) DEVICE — CATHETER, VALVULOPLASTY, TRUE DILATION, 12FR X 21MM X 110CM

## (undated) DEVICE — CATHETER, PACING, PACEL, FLOW DIRECTED, 5 FR X 110 CM

## (undated) DEVICE — SHEATH, INTRODUCER, MICRA, 23FR  X 55.7CM

## (undated) DEVICE — CATHETER, ANGIO, IMPULSE, PIGTAIL, 6 FR X 110 CM

## (undated) DEVICE — 3M™ MEDIPORE™ + PAD 3564: Brand: 3M™ MEDIPORE™

## (undated) DEVICE — CATHETER, ANGIO, IMPULSE, PIG 155, 6 FR X 110 CM

## (undated) DEVICE — SPONGE GZ W4XL4IN RAYON POLY FILL CVR W/ NONWOVEN FAB

## (undated) DEVICE — STANDARD SURGICAL GOWN, L: Brand: CONVERTORS

## (undated) DEVICE — INTENDED FOR TISSUE SEPARATION, AND OTHER PROCEDURES THAT REQUIRE A SHARP SURGICAL BLADE TO PUNCTURE OR CUT.: Brand: BARD-PARKER SAFETY BLADES SIZE 11, STERILE

## (undated) DEVICE — NEEDLE 25G X 5/8

## (undated) DEVICE — SUTURE BOOTS YELLOW

## (undated) DEVICE — SYRINGE 20ML LL

## (undated) DEVICE — CATHETER, BALLOON DILATION, EUPHORA SEMICOMPLIANT 2.0  X 25 MM X 142CM

## (undated) DEVICE — SYRINGE MED 10ML LUERLOCK TIP W/O SFTY DISP

## (undated) DEVICE — GUIDEWIRE, HI-TORQUE, VERSACORE, 145CM, FLOPPY

## (undated) DEVICE — MAGNETIC INSTR DRAPE 20X16: Brand: MEDLINE INDUSTRIES, INC.

## (undated) DEVICE — INTRODUCER, SHEATH, FAST-CATH, 8FR X 12CM, C-LOCK

## (undated) DEVICE — TOWEL,OR,DSP,ST,BLUE,STD,6/PK,12PK/CS: Brand: MEDLINE

## (undated) DEVICE — VESSEL LOOPS X-RAY DETECTABLE: Brand: DEROYAL

## (undated) DEVICE — PACK SURG CARDIAC CATH

## (undated) DEVICE — PAD, ELECTRODE DEFIB PADPRO ADULT STRL W/ADAPTER

## (undated) DEVICE — Device

## (undated) DEVICE — COVER,LIGHT HANDLE,FLX,2/PK: Brand: MEDLINE INDUSTRIES, INC.

## (undated) DEVICE — GRADUATE TRIANG MEASURE 1000ML BLK PRNT

## (undated) DEVICE — SURGICAL PROCEDURE PACK VASC MAJ CUST

## (undated) DEVICE — LIGACLIP MCA MULTIPLE CLIP APPLIERS, 20 SMALL CLIPS: Brand: LIGACLIP

## (undated) DEVICE — CHLORAPREP HI-LITE 26ML ORANGE

## (undated) DEVICE — GLOVE SRG BIOGEL 7.5

## (undated) DEVICE — FOGARTY SPRING CLIPS 6MM: Brand: FOGARTY SOFTJAW

## (undated) DEVICE — LOOP VES W13MM THK09MM MINI RED SIL FLD REPELLENT

## (undated) DEVICE — SHEATH, BRITE TIP 6 X 35CM

## (undated) DEVICE — SOLUTION IRRIG 500ML 0.9% SOD CHL USP POUR PLAS BTL

## (undated) DEVICE — LABEL MED 4 IN SURG PANEL W/ PEN STRL

## (undated) DEVICE — BLOCK BITE 60FR RUBBER ADLT DENTAL

## (undated) DEVICE — DILATOR, VESSEL, COONS, 22FR X 20CM

## (undated) DEVICE — BAG DECANTER

## (undated) DEVICE — SUT MONOCRYL 3-0 SH 27 IN Y416H

## (undated) DEVICE — SUT SILK 3-0 30 IN A304H

## (undated) DEVICE — PETRI DISH STERILE

## (undated) DEVICE — CATHETER, GUIDING, LAUNCHER, 6FR EBU 3.5

## (undated) DEVICE — GUIDEWIRE, ROTAWIRE DRIVE, FLOPPY

## (undated) DEVICE — REAGENT TEST UREASE RAPD CLOTEST F/

## (undated) DEVICE — BLADE CLIPPER GEN PURP NS

## (undated) DEVICE — SURVIVAL KIT, EVEREST 30

## (undated) DEVICE — CATHETER, DRAGONFLY, OPSTAR

## (undated) DEVICE — SYSTEM CATH 20GA L1IN OD1.0668-1.143MM ID0.7874-0.8636MM

## (undated) DEVICE — CABLE, CATH TO CARTO SYSTEM, 12 HYP/10 REDEL (REPROCESSED)

## (undated) DEVICE — CATHETER, BALLOON, NC EUPHORA NONCOMPLIANT 2.0 X 12 X 142CM

## (undated) DEVICE — DRAPE SHEET: Brand: UNBRANDED

## (undated) DEVICE — ADHESIVE SKIN CLOSURE TOP 36 CC HI VISC DERMBND MINI

## (undated) DEVICE — GUIDEWIRE, SAFARI2, EXTRA SUPPORT, EXTRA SMALL CURVE

## (undated) DEVICE — 18 GA N.G. KIT, 10 PACK: Brand: SITE-RITE

## (undated) DEVICE — GLOVE ORANGE PI 7 1/2   MSG9075

## (undated) DEVICE — CLOTH SURG PREP PREOPERATIVE CHLORHEXIDINE GLUC 2% READYPREP

## (undated) DEVICE — SCD SEQUENTIAL COMPRESSION COMFORT SLEEVE MEDIUM KNEE LENGTH: Brand: KENDALL SCD

## (undated) DEVICE — DELIVERY SYSTEM, EVOLUT FX, 23-29MM

## (undated) DEVICE — IV CATH INTROCAN 18G X 1 1/4 SAFETY

## (undated) DEVICE — X-RAY DETECTABLE SPONGES,16 PLY: Brand: VISTEC

## (undated) DEVICE — 3M™ TEGADERM™ TRANSPARENT FILM DRESSING FRAME STYLE, 1624W, 2-3/8 IN X 2-3/4 IN (6 CM X 7 CM), 100/CT 4CT/CASE: Brand: 3M™ TEGADERM™

## (undated) DEVICE — SPECIMEN CONTAINER STERILE PEEL PACK

## (undated) DEVICE — INTENDED FOR TISSUE SEPARATION, AND OTHER PROCEDURES THAT REQUIRE A SHARP SURGICAL BLADE TO PUNCTURE OR CUT.: Brand: BARD-PARKER SAFETY BLADES SIZE 15, STERILE

## (undated) DEVICE — TUBING PRSS MON L12IN PVC RIG NONEXPANDING M TO FEM CONN

## (undated) DEVICE — CATHETER, BALLOON, NC EUPHORA NONCOMPLIANT 2.75 X 15 X 142CM

## (undated) DEVICE — GUIDEWIRE, J-TIP, AMPLATZ SUPER STIFF, 0.035 IN X 180 CM

## (undated) DEVICE — 2000CC GUARDIAN II: Brand: GUARDIAN

## (undated) DEVICE — GLOVE INDICATOR PI UNDERGLOVE SZ 8 BLUE

## (undated) DEVICE — GUIDEWIRE, ASAHI SION BLACK, 0.014 X 190CM, PRE-SHAPE

## (undated) DEVICE — NEEDLE HYPO 25GA L1.5IN BLU POLYPR HUB S STL REG BVL STR

## (undated) DEVICE — CABLE, PACING PATIENT BIPOLAR 8'

## (undated) DEVICE — CATHETER, BALLOON, NC EUPHORA NONCOMPLIANT 3.5 X 12 X 142CM

## (undated) DEVICE — SET SURG INSTR MINI VASC

## (undated) DEVICE — GUIDEWIRE, RUN THROUGH WIRE, 180CM

## (undated) DEVICE — GLOVE ORTHO 8   MSG9480

## (undated) DEVICE — SHEATH, PINNACLE, 10 CM,  6FR INTRODUCER, 6FR DIA, 2.5 CM DIALATOR

## (undated) DEVICE — CATHETER, GUIDELINER, 6FR V2

## (undated) DEVICE — CATHETER HAD ADMIN SET LNG TERM PRECRV W/ SIDE H

## (undated) DEVICE — SHEET, T, LAPAROTOMY, STERILE: Brand: MEDLINE

## (undated) DEVICE — RADIFOCUS GLIDEWIRE ADVANTAGE GUIDEWIRE: Brand: GLIDEWIRE ADVANTAGE

## (undated) DEVICE — CATHETER, BALLOON DILATION, LOMA VISTA, 22MM X 4.5CM

## (undated) DEVICE — VALVE, CONTROL BLEEDBACK

## (undated) DEVICE — ACCESS KIT, MINI MAK, 4 FR X 10CM, 0.018 X 40CM, NITINOL/PLATINUM, STD NEEDLE

## (undated) DEVICE — CANNULA NSL CANN NSL L25FT TBNG AD O2 SUP SFT UC

## (undated) DEVICE — MICROPUNCTURE INTRODUCER SET SILHOUETTE TRANSITIONLESS PUSH-PLUS DESIGN - STIFFENED CANNULA WITH STAINLESS STEEL WIRE GUIDE: Brand: MICROPUNCTURE

## (undated) DEVICE — CATHETER, ANGIO, IMPULSE, FL3.5, 6 FR X 100 CM

## (undated) DEVICE — ACCESS KIT, S-MAK MINI, 4FR 10CM 0.018IN 40CM, NT/PT, ECHO ENHANCE NEEDLE

## (undated) DEVICE — NEEDLE 25G X 1 1/2

## (undated) DEVICE — DEVICE, CLOSURE, PERCLOSE, PROSTYLE

## (undated) DEVICE — GOWN,SIRUS,FABRNF,XL,20/CS: Brand: MEDLINE

## (undated) DEVICE — SPONGE GZ W4XL4IN RAYON POLY FILL CVR W/ NONWOVEN FAB STERILE

## (undated) DEVICE — SYRINGE 10ML LL

## (undated) DEVICE — PACK MAJOR ORTHO W/SPLITS PBDS

## (undated) DEVICE — SUT PROLENE 6-0 BV130 30 IN 8709H

## (undated) DEVICE — VESSEL CANNULA

## (undated) DEVICE — SUT GORTEX CV-6 TTC-9 30 IN 6M02A MICROPOROUS COATED

## (undated) DEVICE — SUT MONOCRYL 4-0 PS-2 27 IN Y426H

## (undated) DEVICE — SURGICEL FIBRILLAR 1 X 2

## (undated) DEVICE — INTRODUCER SHEATH, SENTRANT ENSURE SEAL 14FR 28CM

## (undated) DEVICE — GOWN,SIRUS,FABRNF,L,20/CS: Brand: MEDLINE

## (undated) DEVICE — DOUBLE BASIN SET: Brand: MEDLINE INDUSTRIES, INC.

## (undated) DEVICE — SUT SILK 4-0 30 IN A303H

## (undated) DEVICE — DILATOR ART

## (undated) DEVICE — INTRODUCER SET, FAST CATHETER, HEMOSTASIS, 7 FR X 12CM, W/LUER LOCK, SIDEPORT

## (undated) DEVICE — GUIDE WIRE, 035/190CM, HI-TORGUE SUPRA CORE

## (undated) DEVICE — SUT SILK 2-0 30 IN A305H

## (undated) DEVICE — LIGACLIP MCA MULTIPLE CLIP APPLIERS, 20 MEDIUM CLIPS: Brand: LIGACLIP

## (undated) DEVICE — SYRINGE 10ML LL CONTROL TOP

## (undated) DEVICE — BULB SYRINGE,IRRIGATION WITH PROTECTIVE CAP: Brand: DOVER

## (undated) DEVICE — SCANLAN® VASCU-STATT® SINGLE-USE BULLDOG CLAMP - MIDI ANGLED 45° (WHITE), CLAMPING PRESSURE 25-30 G (2/STERILE PKG): Brand: SCANLAN® VASCU-STATT® SINGLE-USE BULLDOG CLAMP

## (undated) DEVICE — SYRINGE MED 10ML POLYPR LUERSLIP TIP FLAT TOP W/O SFTY DISP

## (undated) DEVICE — DEVICE, ROTAPRO 2.0, EXCHANGEABLE BURR, 1.5MM X 135CM

## (undated) DEVICE — AV FISTULA: Brand: MEDLINE INDUSTRIES, INC.

## (undated) DEVICE — INTRODUCER, SHEATH, FAST-CATH, 7FR X 12CM, C-LOCK